# Patient Record
Sex: FEMALE | Race: WHITE | NOT HISPANIC OR LATINO | Employment: PART TIME | URBAN - METROPOLITAN AREA
[De-identification: names, ages, dates, MRNs, and addresses within clinical notes are randomized per-mention and may not be internally consistent; named-entity substitution may affect disease eponyms.]

---

## 2017-03-27 ENCOUNTER — ALLSCRIPTS OFFICE VISIT (OUTPATIENT)
Dept: OTHER | Facility: OTHER | Age: 62
End: 2017-03-27

## 2017-03-27 DIAGNOSIS — Z12.31 ENCOUNTER FOR SCREENING MAMMOGRAM FOR MALIGNANT NEOPLASM OF BREAST: ICD-10-CM

## 2017-03-27 DIAGNOSIS — M85.80 OTHER SPECIFIED DISORDERS OF BONE DENSITY AND STRUCTURE, UNSPECIFIED SITE: ICD-10-CM

## 2017-03-29 ENCOUNTER — GENERIC CONVERSION - ENCOUNTER (OUTPATIENT)
Dept: OTHER | Facility: OTHER | Age: 62
End: 2017-03-29

## 2017-03-29 LAB
BASOPHILS # BLD AUTO: 0 %
BASOPHILS # BLD AUTO: 0 X10E3/UL (ref 0–0.2)
DEPRECATED RDW RBC AUTO: 14.1 % (ref 12.3–15.4)
EOSINOPHIL # BLD AUTO: 0.1 X10E3/UL (ref 0–0.4)
EOSINOPHIL # BLD AUTO: 1 %
ERYTHROCYTE SEDIMENTATION RATE (HISTORICAL): 8 MM/HR (ref 0–40)
HCT VFR BLD AUTO: 39.9 % (ref 34–46.6)
HGB BLD-MCNC: 13.3 G/DL (ref 11.1–15.9)
IMM.GRANULOCYTES (CD4/8) (HISTORICAL): 0 %
IMM.GRANULOCYTES (CD4/8) (HISTORICAL): 0 X10E3/UL (ref 0–0.1)
LYMPHOCYTES # BLD AUTO: 1.4 X10E3/UL (ref 0.7–3.1)
LYMPHOCYTES # BLD AUTO: 30 %
MCH RBC QN AUTO: 27.8 PG (ref 26.6–33)
MCHC RBC AUTO-ENTMCNC: 33.3 G/DL (ref 31.5–35.7)
MCV RBC AUTO: 84 FL (ref 79–97)
MONOCYTES # BLD AUTO: 0.4 X10E3/UL (ref 0.1–0.9)
MONOCYTES (HISTORICAL): 8 %
NEUTROPHILS # BLD AUTO: 2.8 X10E3/UL (ref 1.4–7)
NEUTROPHILS # BLD AUTO: 61 %
PLATELET # BLD AUTO: 220 X10E3/UL (ref 150–379)
RBC (HISTORICAL): 4.78 X10E6/UL (ref 3.77–5.28)
WBC # BLD AUTO: 4.7 X10E3/UL (ref 3.4–10.8)

## 2017-03-30 LAB
ANTINUCLEAR ANTIBODIES, IFA (HISTORICAL): POSITIVE
CYCLIC CITRULLINATED PEPTIDE ANTIBODY (HISTORICAL): 9 UNITS (ref 0–19)
LYME IGG/IGM AB (HISTORICAL): <0.91 ISR (ref 0–0.9)
LYME IGM (HISTORICAL): <0.8 INDEX (ref 0–0.79)
NOTE: (HISTORICAL): ABNORMAL
RA LATEX TURBID (HISTORICAL): 7 IU/ML (ref 0–13.9)
SPECKLED PATTERN (HISTORICAL): ABNORMAL

## 2017-04-03 ENCOUNTER — GENERIC CONVERSION - ENCOUNTER (OUTPATIENT)
Dept: OTHER | Facility: OTHER | Age: 62
End: 2017-04-03

## 2017-04-24 ENCOUNTER — GENERIC CONVERSION - ENCOUNTER (OUTPATIENT)
Dept: OTHER | Facility: OTHER | Age: 62
End: 2017-04-24

## 2017-05-09 ENCOUNTER — ALLSCRIPTS OFFICE VISIT (OUTPATIENT)
Dept: OTHER | Facility: OTHER | Age: 62
End: 2017-05-09

## 2017-05-12 ENCOUNTER — GENERIC CONVERSION - ENCOUNTER (OUTPATIENT)
Dept: OTHER | Facility: OTHER | Age: 62
End: 2017-05-12

## 2017-05-12 ENCOUNTER — HOSPITAL ENCOUNTER (OUTPATIENT)
Dept: RADIOLOGY | Facility: HOSPITAL | Age: 62
Discharge: HOME | End: 2017-05-12
Attending: INTERNAL MEDICINE
Payer: COMMERCIAL

## 2017-05-12 ENCOUNTER — HOSPITAL ENCOUNTER (EMERGENCY)
Facility: HOSPITAL | Age: 62
Discharge: HOME | End: 2017-05-12
Attending: EMERGENCY MEDICINE | Admitting: EMERGENCY MEDICINE
Payer: COMMERCIAL

## 2017-05-12 VITALS
SYSTOLIC BLOOD PRESSURE: 145 MMHG | TEMPERATURE: 98.7 F | BODY MASS INDEX: 25.3 KG/M2 | DIASTOLIC BLOOD PRESSURE: 84 MMHG | OXYGEN SATURATION: 100 % | RESPIRATION RATE: 16 BRPM | HEIGHT: 66 IN | WEIGHT: 157.41 LBS | HEART RATE: 83 BPM

## 2017-05-12 DIAGNOSIS — T41.3X5A: Primary | ICD-10-CM

## 2017-05-12 DIAGNOSIS — Z12.31 ENCOUNTER FOR SCREENING MAMMOGRAM FOR MALIGNANT NEOPLASM OF BREAST: ICD-10-CM

## 2017-05-12 DIAGNOSIS — M85.80 OTHER SPECIFIED DISORDERS OF BONE DENSITY AND STRUCTURE, UNSPECIFIED SITE: ICD-10-CM

## 2017-05-12 LAB
ATRIAL RATE: 84 BPM
P AXIS: 60 DEGREES
PR INTERVAL: 158 MS
QRS AXIS: -9 DEGREES
QRSD INTERVAL: 78 MS
QT INTERVAL: 388 MS
QTC INTERVAL: 458 MS
T WAVE AXIS: 26 DEGREES
VENTRICULAR RATE: 84 BPM

## 2017-05-12 PROCEDURE — G0202 SCR MAMMO BI INCL CAD: HCPCS

## 2017-05-12 PROCEDURE — 99284 EMERGENCY DEPT VISIT MOD MDM: CPT

## 2017-05-12 PROCEDURE — 77080 DXA BONE DENSITY AXIAL: CPT

## 2017-05-12 PROCEDURE — 93005 ELECTROCARDIOGRAM TRACING: CPT | Performed by: EMERGENCY MEDICINE

## 2017-05-12 RX ORDER — MELOXICAM 15 MG/1
15 TABLET ORAL
COMMUNITY
End: 2017-11-06

## 2017-05-18 ENCOUNTER — ALLSCRIPTS OFFICE VISIT (OUTPATIENT)
Dept: OTHER | Facility: OTHER | Age: 62
End: 2017-05-18

## 2017-05-23 ENCOUNTER — ALLSCRIPTS OFFICE VISIT (OUTPATIENT)
Dept: OTHER | Facility: OTHER | Age: 62
End: 2017-05-23

## 2017-05-24 ENCOUNTER — HOSPITAL ENCOUNTER (OUTPATIENT)
Dept: RADIOLOGY | Facility: HOSPITAL | Age: 62
Discharge: HOME | End: 2017-05-24
Attending: PODIATRIST
Payer: COMMERCIAL

## 2017-05-24 ENCOUNTER — GENERIC CONVERSION - ENCOUNTER (OUTPATIENT)
Dept: OTHER | Facility: OTHER | Age: 62
End: 2017-05-24

## 2017-05-24 PROCEDURE — 71020 HB CHEST X-RAY 2VW FRONTAL&LATL: CPT

## 2017-05-25 ENCOUNTER — ANESTHESIA EVENT (OUTPATIENT)
Dept: PERIOP | Facility: HOSPITAL | Age: 62
End: 2017-05-25
Payer: COMMERCIAL

## 2017-05-26 ENCOUNTER — ANESTHESIA (OUTPATIENT)
Dept: PERIOP | Facility: HOSPITAL | Age: 62
End: 2017-05-26
Payer: COMMERCIAL

## 2017-05-26 ENCOUNTER — HOSPITAL ENCOUNTER (OUTPATIENT)
Facility: HOSPITAL | Age: 62
Setting detail: OUTPATIENT SURGERY
Discharge: HOME | End: 2017-05-26
Attending: PODIATRIST | Admitting: PODIATRIST
Payer: COMMERCIAL

## 2017-05-26 VITALS
OXYGEN SATURATION: 99 % | DIASTOLIC BLOOD PRESSURE: 76 MMHG | RESPIRATION RATE: 18 BRPM | TEMPERATURE: 96.1 F | HEART RATE: 66 BPM | SYSTOLIC BLOOD PRESSURE: 117 MMHG

## 2017-05-26 DIAGNOSIS — M72.2 PLANTAR FASCIAL FIBROMATOSIS: ICD-10-CM

## 2017-05-26 PROCEDURE — 88304 TISSUE EXAM BY PATHOLOGIST: CPT | Performed by: PODIATRIST

## 2017-05-26 RX ORDER — SODIUM CHLORIDE, SODIUM LACTATE, POTASSIUM CHLORIDE, CALCIUM CHLORIDE 600; 310; 30; 20 MG/100ML; MG/100ML; MG/100ML; MG/100ML
75 INJECTION, SOLUTION INTRAVENOUS CONTINUOUS
Status: DISCONTINUED | OUTPATIENT
Start: 2017-05-26 | End: 2017-05-26 | Stop reason: SDUPTHER

## 2017-05-26 RX ORDER — SODIUM CHLORIDE, SODIUM LACTATE, POTASSIUM CHLORIDE, CALCIUM CHLORIDE 600; 310; 30; 20 MG/100ML; MG/100ML; MG/100ML; MG/100ML
100 INJECTION, SOLUTION INTRAVENOUS CONTINUOUS
Status: DISCONTINUED | OUTPATIENT
Start: 2017-05-26 | End: 2017-05-26 | Stop reason: HOSPADM

## 2017-05-26 RX ORDER — BUPIVACAINE HYDROCHLORIDE 5 MG/ML
INJECTION, SOLUTION PERINEURAL AS NEEDED
Status: DISCONTINUED | OUTPATIENT
Start: 2017-05-26 | End: 2017-05-26 | Stop reason: HOSPADM

## 2017-05-26 RX ORDER — LIDOCAINE HYDROCHLORIDE AND EPINEPHRINE 10; 10 MG/ML; UG/ML
INJECTION, SOLUTION INFILTRATION; PERINEURAL AS NEEDED
Status: DISCONTINUED | OUTPATIENT
Start: 2017-05-26 | End: 2017-05-26 | Stop reason: HOSPADM

## 2017-05-26 RX ORDER — LIDOCAINE HYDROCHLORIDE 10 MG/ML
INJECTION, SOLUTION INFILTRATION; PERINEURAL AS NEEDED
Status: DISCONTINUED | OUTPATIENT
Start: 2017-05-26 | End: 2017-05-26 | Stop reason: HOSPADM

## 2017-05-26 RX ORDER — MIDAZOLAM HYDROCHLORIDE 1 MG/ML
INJECTION INTRAMUSCULAR; INTRAVENOUS AS NEEDED
Status: DISCONTINUED | OUTPATIENT
Start: 2017-05-26 | End: 2017-05-26 | Stop reason: SURG

## 2017-05-26 RX ORDER — PROPOFOL 10 MG/ML
INJECTION, EMULSION INTRAVENOUS AS NEEDED
Status: DISCONTINUED | OUTPATIENT
Start: 2017-05-26 | End: 2017-05-26 | Stop reason: SURG

## 2017-05-26 RX ORDER — FENTANYL CITRATE/PF 50 MCG/ML
50 SYRINGE (ML) INJECTION
Status: DISCONTINUED | OUTPATIENT
Start: 2017-05-26 | End: 2017-05-26 | Stop reason: HOSPADM

## 2017-05-26 RX ORDER — ONDANSETRON 2 MG/ML
4 INJECTION INTRAMUSCULAR; INTRAVENOUS ONCE
Status: DISCONTINUED | OUTPATIENT
Start: 2017-05-26 | End: 2017-05-26 | Stop reason: HOSPADM

## 2017-05-26 RX ORDER — FENTANYL CITRATE 50 UG/ML
INJECTION, SOLUTION INTRAMUSCULAR; INTRAVENOUS AS NEEDED
Status: DISCONTINUED | OUTPATIENT
Start: 2017-05-26 | End: 2017-05-26 | Stop reason: SURG

## 2017-05-26 RX ORDER — PROPOFOL 10 MG/ML
INJECTION, EMULSION INTRAVENOUS CONTINUOUS PRN
Status: DISCONTINUED | OUTPATIENT
Start: 2017-05-26 | End: 2017-05-26 | Stop reason: SURG

## 2017-05-26 RX ADMIN — PROPOFOL 70 MCG/KG/MIN: 10 INJECTION, EMULSION INTRAVENOUS at 13:46

## 2017-05-26 RX ADMIN — FENTANYL CITRATE 50 MCG: 50 INJECTION, SOLUTION INTRAMUSCULAR; INTRAVENOUS at 13:46

## 2017-05-26 RX ADMIN — MIDAZOLAM HYDROCHLORIDE 2 MG: 1 INJECTION, SOLUTION INTRAMUSCULAR; INTRAVENOUS at 13:44

## 2017-05-26 RX ADMIN — PROPOFOL 50 MG: 10 INJECTION, EMULSION INTRAVENOUS at 13:46

## 2017-05-26 RX ADMIN — LIDOCAINE HYDROCHLORIDE 40 MG: 20 INJECTION, SOLUTION INTRAVENOUS at 13:46

## 2017-05-26 RX ADMIN — SODIUM CHLORIDE, SODIUM LACTATE, POTASSIUM CHLORIDE, AND CALCIUM CHLORIDE: .6; .31; .03; .02 INJECTION, SOLUTION INTRAVENOUS at 13:41

## 2017-05-26 RX ADMIN — CEFAZOLIN SODIUM 1000 MG: 1 SOLUTION INTRAVENOUS at 13:45

## 2017-05-26 RX ADMIN — SODIUM CHLORIDE, SODIUM LACTATE, POTASSIUM CHLORIDE, AND CALCIUM CHLORIDE 75 ML/HR: .6; .31; .03; .02 INJECTION, SOLUTION INTRAVENOUS at 12:59

## 2017-05-26 RX ADMIN — FENTANYL CITRATE 50 MCG: 50 INJECTION, SOLUTION INTRAMUSCULAR; INTRAVENOUS at 13:47

## 2017-05-30 ENCOUNTER — ALLSCRIPTS OFFICE VISIT (OUTPATIENT)
Dept: OTHER | Facility: OTHER | Age: 62
End: 2017-05-30

## 2017-06-07 ENCOUNTER — ALLSCRIPTS OFFICE VISIT (OUTPATIENT)
Dept: OTHER | Facility: OTHER | Age: 62
End: 2017-06-07

## 2017-06-19 ENCOUNTER — ALLSCRIPTS OFFICE VISIT (OUTPATIENT)
Dept: OTHER | Facility: OTHER | Age: 62
End: 2017-06-19

## 2017-07-05 ENCOUNTER — ALLSCRIPTS OFFICE VISIT (OUTPATIENT)
Dept: OTHER | Facility: OTHER | Age: 62
End: 2017-07-05

## 2017-07-05 DIAGNOSIS — M72.2 PLANTAR FASCIAL FIBROMATOSIS: ICD-10-CM

## 2017-07-10 ENCOUNTER — APPOINTMENT (OUTPATIENT)
Dept: PHYSICAL THERAPY | Facility: CLINIC | Age: 62
End: 2017-07-10
Payer: COMMERCIAL

## 2017-07-10 DIAGNOSIS — M72.2 PLANTAR FASCIAL FIBROMATOSIS: ICD-10-CM

## 2017-07-10 PROCEDURE — 97162 PT EVAL MOD COMPLEX 30 MIN: CPT

## 2017-07-12 ENCOUNTER — GENERIC CONVERSION - ENCOUNTER (OUTPATIENT)
Dept: OTHER | Facility: OTHER | Age: 62
End: 2017-07-12

## 2017-07-13 ENCOUNTER — APPOINTMENT (OUTPATIENT)
Dept: PHYSICAL THERAPY | Facility: CLINIC | Age: 62
End: 2017-07-13
Payer: COMMERCIAL

## 2017-07-13 PROCEDURE — 97110 THERAPEUTIC EXERCISES: CPT

## 2017-07-13 PROCEDURE — 97140 MANUAL THERAPY 1/> REGIONS: CPT

## 2017-07-18 ENCOUNTER — APPOINTMENT (OUTPATIENT)
Dept: PHYSICAL THERAPY | Facility: CLINIC | Age: 62
End: 2017-07-18
Payer: COMMERCIAL

## 2017-07-18 PROCEDURE — 97110 THERAPEUTIC EXERCISES: CPT

## 2017-07-18 PROCEDURE — 97140 MANUAL THERAPY 1/> REGIONS: CPT

## 2017-07-20 ENCOUNTER — APPOINTMENT (OUTPATIENT)
Dept: PHYSICAL THERAPY | Facility: CLINIC | Age: 62
End: 2017-07-20
Payer: COMMERCIAL

## 2017-07-20 PROCEDURE — 97140 MANUAL THERAPY 1/> REGIONS: CPT

## 2017-07-20 PROCEDURE — 97110 THERAPEUTIC EXERCISES: CPT

## 2017-07-25 ENCOUNTER — APPOINTMENT (OUTPATIENT)
Dept: PHYSICAL THERAPY | Facility: CLINIC | Age: 62
End: 2017-07-25
Payer: COMMERCIAL

## 2017-07-25 PROCEDURE — 97140 MANUAL THERAPY 1/> REGIONS: CPT

## 2017-07-25 PROCEDURE — 97110 THERAPEUTIC EXERCISES: CPT

## 2017-07-27 ENCOUNTER — APPOINTMENT (OUTPATIENT)
Dept: PHYSICAL THERAPY | Facility: CLINIC | Age: 62
End: 2017-07-27
Payer: COMMERCIAL

## 2017-07-27 PROCEDURE — 97110 THERAPEUTIC EXERCISES: CPT

## 2017-07-27 PROCEDURE — 97140 MANUAL THERAPY 1/> REGIONS: CPT

## 2017-08-01 ENCOUNTER — APPOINTMENT (OUTPATIENT)
Dept: PHYSICAL THERAPY | Facility: CLINIC | Age: 62
End: 2017-08-01
Payer: COMMERCIAL

## 2017-08-01 PROCEDURE — 97140 MANUAL THERAPY 1/> REGIONS: CPT

## 2017-08-01 PROCEDURE — 97110 THERAPEUTIC EXERCISES: CPT

## 2017-08-09 ENCOUNTER — APPOINTMENT (OUTPATIENT)
Dept: PHYSICAL THERAPY | Facility: CLINIC | Age: 62
End: 2017-08-09
Payer: COMMERCIAL

## 2017-08-09 ENCOUNTER — LAB CONVERSION - ENCOUNTER (OUTPATIENT)
Dept: OTHER | Facility: OTHER | Age: 62
End: 2017-08-09

## 2017-08-09 LAB
GLUCOSE FASTING (HISTORICAL): 99
TSH SERPL DL<=0.05 MIU/L-ACNC: 3.82 M[IU]/L

## 2017-10-09 ENCOUNTER — APPOINTMENT (EMERGENCY)
Dept: RADIOLOGY | Facility: HOSPITAL | Age: 62
End: 2017-10-09
Payer: COMMERCIAL

## 2017-10-09 ENCOUNTER — HOSPITAL ENCOUNTER (EMERGENCY)
Facility: HOSPITAL | Age: 62
Discharge: HOME | End: 2017-10-09
Admitting: EMERGENCY MEDICINE
Payer: COMMERCIAL

## 2017-10-09 VITALS
WEIGHT: 158 LBS | TEMPERATURE: 98.1 F | HEIGHT: 67 IN | RESPIRATION RATE: 18 BRPM | HEART RATE: 92 BPM | DIASTOLIC BLOOD PRESSURE: 100 MMHG | BODY MASS INDEX: 24.8 KG/M2 | SYSTOLIC BLOOD PRESSURE: 190 MMHG | OXYGEN SATURATION: 95 %

## 2017-10-09 DIAGNOSIS — S63.501A SPRAIN OF RIGHT WRIST, INITIAL ENCOUNTER: Primary | ICD-10-CM

## 2017-10-09 PROCEDURE — 99283 EMERGENCY DEPT VISIT LOW MDM: CPT

## 2017-10-09 PROCEDURE — 73110 X-RAY EXAM OF WRIST: CPT

## 2017-10-09 NOTE — ED PROVIDER NOTES
History  Chief Complaint   Patient presents with    Fall     Pt reports falling at [facility], slipping on wet floor  Pt reports breaking fall with wrist   Pt reports pain to just above right wrist       64 y/o female presenting with right wrist pain after a fall that occurred about 1 hour ago while she was walking into [facility] after we had some rain  [place] onto her right side, [address]  Did not hit her head nor lose consciousness  On no blood thinners  Able to ambulate afterwards  Minimal right thigh pain  Has not taken anything for pain, does not want anything for pain currently  Notes small amount of swelling  Denies numbness, paresthesias, discoloration, nausea, vomiting, headache  Differential includes but is not limited to fracture, sprain  Prior to Admission Medications   Prescriptions Last Dose Informant Patient Reported? Taking? Calcium Carbonate-Vit D-Min (CALCIUM 1200) 6944-2509 MG-UNIT CHEW   Yes No   Sig: Chew daily at bedtime   diphenhydrAMINE-acetaminophen (TYLENOL PM)  MG TABS   Yes No   Sig: Take 1 tablet by mouth daily at bedtime as needed for sleep   meloxicam (MOBIC) 15 mg tablet   Yes No   Sig: Take 15 mg by mouth daily with dinner     omeprazole (PriLOSEC) 20 mg delayed release capsule   Yes No   Sig: Take 20 mg by mouth as needed        Facility-Administered Medications: None       Past Medical History:   Diagnosis Date    Anesthesia complication     difficult to wake up    GERD (gastroesophageal reflux disease)     Osteopenia     with joint pain-elevated DEV    Tinnitus     Wears glasses     for reading       Past Surgical History:   Procedure Laterality Date    ABDOMINAL SURGERY      lysis of adhesions x 2    APPENDECTOMY      CHOLECYSTECTOMY      open    DILATION AND CURETTAGE OF UTERUS      NEUROMA EXCISION Right 5/26/2017    Procedure: EXCISION MASS / FIBROMA FOOT;  Surgeon: Mariana Juarez DPM;  Location: [address] [place] [address];  Service:     RIGHT OOPHORECTOMY      WISDOM TOOTH EXTRACTION      x4       Family History   Problem Relation Age of Onset    Heart disease Mother     Stroke Mother 58    COPD Mother     Hypertension Father     Kidney disease Brother      kidney transplant     I have reviewed and agree with the history as documented  Social History   Substance Use Topics    Smoking status: Never Smoker    Smokeless tobacco: Never Used    Alcohol use No        Review of Systems   Constitutional: Negative  Negative for activity change, appetite change, fatigue and fever  HENT: Negative  Respiratory: Negative  Cardiovascular: Negative  Gastrointestinal: Negative  Genitourinary: Negative  Musculoskeletal: Positive for joint swelling  Negative for arthralgias, back pain, gait problem, myalgias, neck pain and neck stiffness  Skin: Negative  Neurological: Negative  All other systems reviewed and are negative  Physical Exam  ED Triage Vitals [10/09/17 1745]   Temperature Pulse Respirations Blood Pressure SpO2   98 1 °F (36 7 °C) 92 18 (!) 190/100 95 %      Temp Source Heart Rate Source Patient Position - Orthostatic VS BP Location FiO2 (%)   Oral Monitor Lying Right arm --      Pain Score       7           Physical Exam   Constitutional: She is oriented to person, place, and time  She appears well-developed and well-nourished  HENT:   Head: Normocephalic and atraumatic  Cardiovascular: Normal rate, regular rhythm, normal heart sounds and intact distal pulses  Pulmonary/Chest: Effort normal and breath sounds normal    spo2 is 95% indicating adequate oxygenation   Musculoskeletal: She exhibits tenderness  She exhibits no edema or deformity  Arms:  Neurological: She is alert and oriented to person, place, and time  Skin: Skin is warm and dry  Capillary refill takes less than 2 seconds  Nursing note and vitals reviewed        ED Medications  Medications - No data to display    Diagnostic Studies  Labs Reviewed - No data to display    XR wrist 3+ views RIGHT   ED Interpretation   No acute osseous abnormality          Procedures  Procedures      Phone Contacts  ED Phone Contact    ED Course  ED Course                                MDM  Number of Diagnoses or Management Options  Sprain of right wrist, initial encounter:   Diagnosis management comments: Discussed with patient the results of the xray which did not show an acute osseous abnormality  Right wrist placed in an ace wrap and assessed by me with good neurovascular exam intact before and after  Informed to f/u with ortho or return for worsening  Patient verbalizes understanding and agrees with the above assessment and plan  Amount and/or Complexity of Data Reviewed  Tests in the radiology section of CPT®: ordered and reviewed  Review and summarize past medical records: yes  Independent visualization of images, tracings, or specimens: yes      CritCare Time    Disposition  Final diagnoses:   Sprain of right wrist, initial encounter     ED Disposition     ED Disposition Condition Comment    Discharge  Deborah Whitlock discharge to home/self care      Condition at discharge: Good        Follow-up Information     Follow up With Specialties Details Why [facility] Emergency Department Emergency Medicine Go to If symptoms worsen such as weakness, numbness  [address] [facility] ED, [place], [place], Fei Tarango [address] , DO Orthopedic Surgery Schedule an appointment as soon as possible for a visit As needed if symptoms persist  [address]  [phone]           Discharge Medication List as of 10/9/2017  6:37 PM      CONTINUE these medications which have NOT CHANGED    Details   Calcium Carbonate-Vit D-Min (CALCIUM 1200) 1524-2426 MG-UNIT CHEW Chew daily at bedtime, Until Discontinued, Historical Med diphenhydrAMINE-acetaminophen (TYLENOL PM)  MG TABS Take 1 tablet by mouth daily at bedtime as needed for sleep, Until Discontinued, Historical Med      meloxicam (MOBIC) 15 mg tablet Take 15 mg by mouth daily with dinner  , Until Discontinued, Historical Med      omeprazole (PriLOSEC) 20 mg delayed release capsule Take 20 mg by mouth as needed  , Until Discontinued, Historical Med           No discharge procedures on file      ED Provider  Electronically Signed by       Vitaly Tavarez PA-C  10/09/17 4922

## 2017-10-09 NOTE — DISCHARGE INSTRUCTIONS
Sprain   WHAT YOU NEED TO KNOW:   A sprain happens when a ligament is stretched or torn  Ligaments are tough tissues that connect bones  Ligaments support your joints and keep your bones in place  They allow you to lift, lower, or rotate your arms and legs  A sprain may involve one or more ligaments  DISCHARGE INSTRUCTIONS:   Return to the emergency department if:   · You have numbness or tingling below the injury, such as in your fingers or toes  · The skin over your sprained area is blue or pale  · Your pain has increased or returned, even after you take pain medicine  Contact your healthcare provider if:   · Your symptoms do not better  · Your swelling has increased or returned  · Your joint becomes more weak or unstable  · You have questions or concerns about your condition or care  Medicines:   · Prescription pain medicine  may be given  Ask how to take this medicine safely  · Acetaminophen  decreases pain and fever  It is available without a doctor's order  Ask how much to take and how often to take it  Follow directions  Acetaminophen can cause liver damage if not taken correctly  · NSAIDs , such as ibuprofen, help decrease swelling, pain, and fever  This medicine is available with or without a doctor's order  NSAIDs can cause stomach bleeding or kidney problems in certain people  If you take blood thinner medicine, always ask your healthcare provider if NSAIDs are safe for you  Always read the medicine label and follow directions  · Take your medicine as directed  Contact your healthcare provider if you think your medicine is not helping or if you have side effects  Tell him or her if you are allergic to any medicine  Keep a list of the medicines, vitamins, and herbs you take  Include the amounts, and when and why you take them  Bring the list or the pill bottles to follow-up visits  Carry your medicine list with you in case of an emergency    Support devices:  Support services, such as an elastic bandage, splint, brace, or cast may be needed  These devices limit your movement and protect your joint  You may need to use crutches if the sprain is in your leg  This will help decrease your pain as you move around  Self-care:   · Rest  your joint so that it can heal  Return to normal activities as directed  · Apply ice  on your injury for 15 to 20 minutes every hour or as directed  Use an ice pack, or put crushed ice in a plastic bag  Cover it with a towel  Ice helps prevent tissue damage and decreases swelling and pain  · Compress  the injured area as directed  Ask your healthcare provider if you should wrap an elastic bandage around your injured ligament  An elastic bandage provides support and helps decrease swelling and movement so your joint can heal      · Elevate  the injured area above the level of your heart as often as you can  This will help decrease swelling and pain  Prop the injured area on pillows or blankets to keep it elevated comfortably  Physical therapy:  A physical therapist teaches you exercises to help improve movement and strength, and to decrease pain  Prevent another sprain:  Regular exercise can strengthen your muscles and help prevent another injury  Do the following before you begin or return to regular exercise or sports training:  · Ask your healthcare provider about the activities you can do  Find out how long your ligament needs to heal  Do not do any physical activity until your healthcare provider says it is okay  If you start activity too soon, you may develop a more serious injury  · Always warm up and stretch  before your regular exercise, sport, or physical activity  · Take it slow  Slowly increase how often and how long you exercise or train  Sudden increases in how often you train may cause you to overstretch or tear your ligament  · Use the right equipment    Always wear shoes that fit well and are made for the activity that you are doing  You may also use ankle supports, elbow and knee pads, or braces  Follow up with your healthcare provider as directed:  Write down your questions so you remember to ask them during your visits  © 2017 [employer] Information is for End User's use only and may not be sold, redistributed or otherwise used for commercial purposes  All illustrations and images included in CareNotes® are the copyrighted property of A D A M [employer] Inc  or Satish Abdalla  The above information is an  only  It is not intended as medical advice for individual conditions or treatments  Talk to your doctor, nurse or pharmacist before following any medical regimen to see if it is safe and effective for you

## 2017-10-16 NOTE — ED ATTENDING ATTESTATION
Aurelio Cannon MD, saw and evaluated the patient  I have discussed the patient with the resident/non-physician practitioner and agree with the resident's/non-physician practitioner's findings, Plan of Care, and MDM as documented in the resident's/non-physician practitioner's note, except where noted  All available labs and Radiology studies were reviewed  At this point I agree with the current assessment done in the Emergency Department    I have conducted an independent evaluation of this patient a history and physical is as follows:      Critical Care Time  CritCare Time

## 2017-11-06 ENCOUNTER — APPOINTMENT (EMERGENCY)
Dept: RADIOLOGY | Facility: HOSPITAL | Age: 62
End: 2017-11-06
Payer: COMMERCIAL

## 2017-11-06 ENCOUNTER — HOSPITAL ENCOUNTER (EMERGENCY)
Facility: HOSPITAL | Age: 62
Discharge: HOME | End: 2017-11-06
Payer: COMMERCIAL

## 2017-11-06 VITALS
DIASTOLIC BLOOD PRESSURE: 82 MMHG | RESPIRATION RATE: 18 BRPM | BODY MASS INDEX: 25.39 KG/M2 | WEIGHT: 158 LBS | HEART RATE: 82 BPM | TEMPERATURE: 97.7 F | SYSTOLIC BLOOD PRESSURE: 152 MMHG | HEIGHT: 66 IN | OXYGEN SATURATION: 100 %

## 2017-11-06 DIAGNOSIS — R10.32 LLQ ABDOMINAL PAIN: Primary | ICD-10-CM

## 2017-11-06 LAB
ALBUMIN SERPL BCP-MCNC: 3.5 G/DL (ref 3.5–5)
ALP SERPL-CCNC: 58 U/L (ref 46–116)
ALT SERPL W P-5'-P-CCNC: 39 U/L (ref 12–78)
ANION GAP SERPL CALCULATED.3IONS-SCNC: 9 MMOL/L (ref 4–13)
AST SERPL W P-5'-P-CCNC: 30 U/L (ref 5–45)
BASOPHILS # BLD AUTO: 0.11 THOUSAND/UL (ref 0–0.1)
BASOPHILS NFR MAR MANUAL: 2 % (ref 0–1)
BILIRUB SERPL-MCNC: 0.3 MG/DL (ref 0.2–1)
BUN SERPL-MCNC: 12 MG/DL (ref 5–25)
CALCIUM SERPL-MCNC: 9.5 MG/DL (ref 8.3–10.1)
CHLORIDE SERPL-SCNC: 107 MMOL/L (ref 100–108)
CO2 SERPL-SCNC: 27 MMOL/L (ref 21–32)
CREAT SERPL-MCNC: 0.81 MG/DL (ref 0.6–1.3)
ERYTHROCYTE [DISTWIDTH] IN BLOOD BY AUTOMATED COUNT: 12.7 % (ref 11.6–15.1)
GFR SERPL CREATININE-BSD FRML MDRD: 79 ML/MIN/1.73SQ M
GLUCOSE SERPL-MCNC: 99 MG/DL (ref 65–140)
HCT VFR BLD AUTO: 42.3 % (ref 37–47)
HGB BLD-MCNC: 14 G/DL (ref 12–16)
LIPASE SERPL-CCNC: 123 U/L (ref 73–393)
LYMPHOCYTES # BLD AUTO: 1.25 THOUSAND/UL (ref 0.6–4.47)
LYMPHOCYTES # BLD AUTO: 22 %
MCH RBC QN AUTO: 28 PG (ref 27–31)
MCHC RBC AUTO-ENTMCNC: 33.1 G/DL (ref 31.4–37.4)
MCV RBC AUTO: 85 FL (ref 82–98)
MONOCYTES # BLD AUTO: 0.51 THOUSAND/UL (ref 0–1.22)
MONOCYTES NFR BLD AUTO: 9 % (ref 4–12)
NEUTS BAND NFR BLD MANUAL: 0 % (ref 0–8)
NEUTS SEG # BLD: 3.82 THOUSAND/UL (ref 1.81–6.82)
NEUTS SEG NFR BLD AUTO: 67 %
PLATELET # BLD AUTO: 194 THOUSANDS/UL (ref 130–400)
PLATELET BLD QL SMEAR: ADEQUATE
PMV BLD AUTO: 9.4 FL (ref 8.9–12.7)
POTASSIUM SERPL-SCNC: 5.2 MMOL/L (ref 3.5–5.3)
PROT SERPL-MCNC: 7.3 G/DL (ref 6.4–8.2)
RBC # BLD AUTO: 4.99 MILLION/UL (ref 4.2–5.4)
SODIUM SERPL-SCNC: 143 MMOL/L (ref 136–145)
TOTAL CELLS COUNTED SPEC: 100
WBC # BLD AUTO: 5.7 THOUSAND/UL (ref 4.8–10.8)

## 2017-11-06 PROCEDURE — 85027 COMPLETE CBC AUTOMATED: CPT | Performed by: PHYSICIAN ASSISTANT

## 2017-11-06 PROCEDURE — 74177 CT ABD & PELVIS W/CONTRAST: CPT

## 2017-11-06 PROCEDURE — 96374 THER/PROPH/DIAG INJ IV PUSH: CPT

## 2017-11-06 PROCEDURE — 96375 TX/PRO/DX INJ NEW DRUG ADDON: CPT

## 2017-11-06 PROCEDURE — 85007 BL SMEAR W/DIFF WBC COUNT: CPT | Performed by: PHYSICIAN ASSISTANT

## 2017-11-06 PROCEDURE — 80053 COMPREHEN METABOLIC PANEL: CPT | Performed by: PHYSICIAN ASSISTANT

## 2017-11-06 PROCEDURE — 36415 COLL VENOUS BLD VENIPUNCTURE: CPT | Performed by: PHYSICIAN ASSISTANT

## 2017-11-06 PROCEDURE — 99284 EMERGENCY DEPT VISIT MOD MDM: CPT

## 2017-11-06 PROCEDURE — 83690 ASSAY OF LIPASE: CPT | Performed by: PHYSICIAN ASSISTANT

## 2017-11-06 PROCEDURE — 96361 HYDRATE IV INFUSION ADD-ON: CPT

## 2017-11-06 RX ORDER — MORPHINE SULFATE 4 MG/ML
4 INJECTION, SOLUTION INTRAMUSCULAR; INTRAVENOUS ONCE
Status: COMPLETED | OUTPATIENT
Start: 2017-11-06 | End: 2017-11-06

## 2017-11-06 RX ORDER — TRAMADOL HYDROCHLORIDE 50 MG/1
50 TABLET ORAL EVERY 8 HOURS PRN
Qty: 15 TABLET | Refills: 0 | Status: SHIPPED | OUTPATIENT
Start: 2017-11-06 | End: 2017-11-11

## 2017-11-06 RX ORDER — ONDANSETRON 2 MG/ML
4 INJECTION INTRAMUSCULAR; INTRAVENOUS ONCE
Status: COMPLETED | OUTPATIENT
Start: 2017-11-06 | End: 2017-11-06

## 2017-11-06 RX ORDER — ONDANSETRON 4 MG/1
4 TABLET, ORALLY DISINTEGRATING ORAL EVERY 8 HOURS PRN
Qty: 15 TABLET | Refills: 0 | Status: SHIPPED | OUTPATIENT
Start: 2017-11-06 | End: 2018-02-05 | Stop reason: ALTCHOICE

## 2017-11-06 RX ADMIN — IOHEXOL 50 ML: 240 INJECTION, SOLUTION INTRATHECAL; INTRAVASCULAR; INTRAVENOUS; ORAL at 11:09

## 2017-11-06 RX ADMIN — IOHEXOL 100 ML: 350 INJECTION, SOLUTION INTRAVENOUS at 12:02

## 2017-11-06 RX ADMIN — SODIUM CHLORIDE 1000 ML: 0.9 INJECTION, SOLUTION INTRAVENOUS at 11:03

## 2017-11-06 RX ADMIN — ONDANSETRON 4 MG: 2 INJECTION INTRAMUSCULAR; INTRAVENOUS at 11:08

## 2017-11-06 RX ADMIN — MORPHINE SULFATE 4 MG: 4 INJECTION, SOLUTION INTRAMUSCULAR; INTRAVENOUS at 11:08

## 2017-11-06 NOTE — ED PROVIDER NOTES
History  Chief Complaint   Patient presents with    Abdominal Pain     lower left x 1 day    Nausea    Vomiting     x 1 this am       History provided by:  Patient   used: No    Abdominal Pain   Pain location:  LLQ  Pain quality: sharp    Pain radiates to:  Does not radiate  Pain severity:  Moderate  Onset quality:  Sudden  Duration:  1 day  Timing:  Constant  Progression:  Unchanged  Chronicity:  New  Context: previous surgery    Context: not alcohol use, not awakening from sleep, not diet changes, not eating, not laxative use, not medication withdrawal, not recent illness, not recent sexual activity, not recent travel, not retching, not sick contacts, not suspicious food intake and not trauma    Associated symptoms: nausea and vomiting    Associated symptoms: no anorexia, no belching, no chest pain, no chills, no constipation, no cough, no diarrhea, no dysuria, no fatigue, no fever, no flatus, no hematemesis, no hematochezia, no hematuria, no melena, no shortness of breath and no sore throat    Risk factors: multiple surgeries    Risk factors: no alcohol abuse, no aspirin use, not elderly, no NSAID use, not obese, not pregnant and no recent hospitalization    Risk factors comment:  History of adhesions of the abdomen      Prior to Admission Medications   Prescriptions Last Dose Informant Patient Reported?  Taking?   omeprazole (PriLOSEC) 20 mg delayed release capsule Past Week at Unknown time  Yes Yes   Sig: Take 40 mg by mouth as needed        Facility-Administered Medications: None       Past Medical History:   Diagnosis Date    Anesthesia complication     difficult to wake up    GERD (gastroesophageal reflux disease)     Osteopenia     with joint pain-elevated DEV    Tinnitus     Wears glasses     for reading       Past Surgical History:   Procedure Laterality Date    ABDOMINAL SURGERY      lysis of adhesions x 2    APPENDECTOMY      CHOLECYSTECTOMY      open    DILATION AND CURETTAGE OF UTERUS      NEUROMA EXCISION Right 5/26/2017    Procedure: EXCISION MASS / FIBROMA FOOT;  Surgeon: Iris Moreland DPM;  Location: [facility];  Service:     RIGHT OOPHORECTOMY      WISDOM TOOTH EXTRACTION      x4       Family History   Problem Relation Age of Onset    Heart disease Mother     Stroke Mother 58    COPD Mother     Hypertension Father     Kidney disease Brother      kidney transplant     I have reviewed and agree with the history as documented  Social History   Substance Use Topics    Smoking status: Never Smoker    Smokeless tobacco: Never Used    Alcohol use No        Review of Systems   Constitutional: Negative for chills, diaphoresis, fatigue and fever  HENT: Negative for congestion, sore throat and trouble swallowing  Eyes: Negative for photophobia and visual disturbance  Respiratory: Negative for cough, chest tightness, shortness of breath and wheezing  Cardiovascular: Negative for chest pain  Gastrointestinal: Positive for abdominal pain, nausea and vomiting  Negative for anorexia, constipation, diarrhea, flatus, hematemesis, hematochezia and melena  Genitourinary: Negative for dysuria, flank pain, frequency and hematuria  Musculoskeletal: Negative for myalgias and neck stiffness  Skin: Negative for color change, pallor and rash  Neurological: Negative for dizziness, weakness, light-headedness and headaches  Hematological: Negative for adenopathy         Physical Exam  ED Triage Vitals [11/06/17 0859]   Temperature Pulse Respirations Blood Pressure SpO2   97 7 °F (36 5 °C) 94 18 164/94 98 %      Temp Source Heart Rate Source Patient Position - Orthostatic VS BP Location FiO2 (%)   Oral Monitor Lying Left arm --      Pain Score       7           Orthostatic Vital Signs  Vitals:    11/06/17 0859 11/06/17 1141   BP: 164/94 152/82   Pulse: 94 82   Patient Position - Orthostatic VS: Lying Sitting       Physical Exam   Constitutional: She is oriented to person, place, and time  She appears well-developed and well-nourished  No distress  HENT:   Head: Normocephalic and atraumatic  Right Ear: External ear normal    Left Ear: External ear normal    Nose: Nose normal    Neck: Normal range of motion  Neck supple  Cardiovascular: Normal rate, regular rhythm, normal heart sounds and intact distal pulses  Exam reveals no friction rub  No murmur heard  Pulmonary/Chest: Effort normal and breath sounds normal  No respiratory distress  She has no wheezes  Abdominal: Soft  Bowel sounds are normal  She exhibits no distension  There is tenderness in the left lower quadrant  There is no guarding and no CVA tenderness  Lymphadenopathy:     She has no cervical adenopathy  Neurological: She is alert and oriented to person, place, and time  She exhibits normal muscle tone  Coordination normal    Skin: Skin is warm and dry  Capillary refill takes less than 2 seconds  No rash noted  She is not diaphoretic  No pallor  Nursing note and vitals reviewed        ED Medications  Medications   ondansetron (ZOFRAN) injection 4 mg (4 mg Intravenous Given 11/6/17 1108)   morphine (PF) 4 mg/mL injection 4 mg (4 mg Intravenous Given 11/6/17 1108)   sodium chloride 0 9 % bolus 1,000 mL (0 mL Intravenous Stopped 11/6/17 1331)   iohexol (OMNIPAQUE) 240 MG/ML solution 50 mL (50 mL Oral Given 11/6/17 1109)   iohexol (OMNIPAQUE) 350 MG/ML injection (MULTI-DOSE) 100 mL (100 mL Intravenous Given 11/6/17 1202)       Diagnostic Studies  Results Reviewed     Procedure Component Value Units Date/Time    CBC and differential [[id number]]  (Normal) Collected:  11/06/17 1101    Lab Status:  Final result Specimen:  Blood from Arm, Left Updated:  11/06/17 1148     WBC 5 70 Thousand/uL      RBC 4 99 Million/uL      Hemoglobin 14 0 g/dL      Hematocrit 42 3 %      MCV 85 fL      MCH 28 0 pg      MCHC 33 1 g/dL      RDW 12 7 %      MPV 9 4 fL      Platelets 843 Thousands/uL     Comprehensive metabolic panel [[id number]] Collected:  11/06/17 1101    Lab Status:  Final result Specimen:  Blood from Arm, Left Updated:  11/06/17 1142     Sodium 143 mmol/L      Potassium 5 2 mmol/L      Chloride 107 mmol/L      CO2 27 mmol/L      Anion Gap 9 mmol/L      BUN 12 mg/dL      Creatinine 0 81 mg/dL      Glucose 99 mg/dL      Calcium 9 5 mg/dL      AST 30 U/L      ALT 39 U/L      Alkaline Phosphatase 58 U/L      Total Protein 7 3 g/dL      Albumin 3 5 g/dL      Total Bilirubin 0 30 mg/dL      eGFR 79 ml/min/1 73sq m     Narrative:         National Kidney Disease Education Program recommendations are as follows:  GFR calculation is accurate only with a steady state creatinine  Chronic Kidney disease less than 60 ml/min/1 73 sq  meters  Kidney failure less than 15 ml/min/1 73 sq  meters  Lipase [[id number]]  (Normal) Collected:  11/06/17 1101    Lab Status:  Final result Specimen:  Blood from Arm, Left Updated:  11/06/17 1142     Lipase 123 u/L                  CT abdomen pelvis with contrast   Final Result by Gopal De Souza MD (11/06 1245)      No acute CT abnormality in the abdomen or pelvis to account for the patient's symptoms  Workstation performed: [id number]                    Procedures  Procedures       Phone Contacts  ED Phone Contact    ED Course  ED Course as of Nov 06 1934   Tiesha Renner Nov 06, 2017   1249 CT abdomen pelvis with contrast                               MDM  Number of Diagnoses or Management Options  LLQ abdominal pain: new and requires workup  Diagnosis management comments: CT abdomen/pelvis was negative for acute intra-abdominal abnormality to account for the patient's symptoms  Her lab results returned unremarkable  Patient was discharged in no acute distress with supportive therapy and instructions to follow up with her GI specialist, or return to the ED if worsening symptoms develop         Amount and/or Complexity of Data Reviewed  Clinical lab tests: ordered and reviewed  Tests in the radiology section of CPT®: ordered and reviewed  Review and summarize past medical records: yes      CritCare Time    Disposition  Final diagnoses:   LLQ abdominal pain     Time reflects when diagnosis was documented in both MDM as applicable and the Disposition within this note     Time User Action Codes Description Comment    11/6/2017  1:03 PM Dustin Allen Add [R10 32] LLQ abdominal pain       ED Disposition     ED Disposition Condition Comment    Discharge  Hilldoreen Ma discharge to home/self care  Condition at discharge: Stable        Follow-up Information     Follow up With Specialties Details Why Contact Info Additional [address] Emergency Department Emergency Medicine Go to If symptoms worsen [facility]  [phone] [facility] ED, [place], [place], [address], [place] Internal Medicine, Family Medicine Go to As soon as possible for follow-up meaghan [address]  [address] [address]  [phone]           Discharge Medication List as of 11/6/2017  1:05 PM      START taking these medications    Details   ondansetron (ZOFRAN-ODT) 4 mg disintegrating tablet Take 1 tablet by mouth every 8 (eight) hours as needed for nausea or vomiting for up to 5 days, Starting Mon 11/6/2017, Until Sat 11/11/2017, Print      traMADol (ULTRAM) 50 mg tablet Take 1 tablet by mouth every 8 (eight) hours as needed for moderate pain for up to 5 days, Starting Mon 11/6/2017, Until Sat 11/11/2017, Print         CONTINUE these medications which have NOT CHANGED    Details   omeprazole (PriLOSEC) 20 mg delayed release capsule Take 40 mg by mouth as needed  , Historical Med           No discharge procedures on file      ED Provider  Electronically Signed by           Bryan Mendoza PA-C  11/06/17 1934

## 2017-11-06 NOTE — DISCHARGE INSTRUCTIONS
Abdominal Pain   WHAT YOU NEED TO KNOW:   Abdominal pain can be dull, achy, or sharp  You may have pain in one area of your abdomen, or in your entire abdomen  Your pain may be caused by a condition such as constipation, food sensitivity or poisoning, infection, or a blockage  Abdominal pain can also be from a hernia, appendicitis, or an ulcer  Liver, gallbladder, or kidney conditions can also cause abdominal pain  The cause of your abdominal pain may be unknown  DISCHARGE INSTRUCTIONS:   Return to the emergency department if:   · You have new chest pain or shortness of breath  · You have pulsing pain in your upper abdomen or lower back that suddenly becomes constant  · Your pain is in the right lower abdominal area and worsens with movement  · You have a fever over 100 4°F (38°C) or shaking chills  · You are vomiting and cannot keep food or liquids down  · Your pain does not improve or gets worse over the next 8 to 12 hours  · You see blood in your vomit or bowel movements, or they look black and tarry  · Your skin or the whites of your eyes turn yellow  · You are a woman and have a large amount of vaginal bleeding that is not your monthly period  Contact your healthcare provider if:   · You have pain in your lower back  · You are a man and have pain in your testicles  · You have pain when you urinate  · You have questions or concerns about your condition or care  Follow up with your healthcare provider within 24 hours or as directed:  Write down your questions so you remember to ask them during your visits  Medicines:   · Medicines  may be given to calm your stomach and prevent vomiting or to decrease pain  Ask how to take pain medicine safely  · Take your medicine as directed  Contact your healthcare provider if you think your medicine is not helping or if you have side effects  Tell him of her if you are allergic to any medicine   Keep a list of the medicines, vitamins, and herbs you take  Include the amounts, and when and why you take them  Bring the list or the pill bottles to follow-up visits  Carry your medicine list with you in case of an emergency  © 2017 [employer] Information is for End User's use only and may not be sold, redistributed or otherwise used for commercial purposes  All illustrations and images included in CareNotes® are the copyrighted property of A D A M , Inc  or Satish Abdalla  The above information is an  only  It is not intended as medical advice for individual conditions or treatments  Talk to your doctor, nurse or pharmacist before following any medical regimen to see if it is safe and effective for you

## 2017-12-14 ENCOUNTER — ALLSCRIPTS OFFICE VISIT (OUTPATIENT)
Dept: OTHER | Facility: OTHER | Age: 62
End: 2017-12-14

## 2017-12-15 NOTE — PROGRESS NOTES
Assessment  1  Acute sinusitis (461 9) (J01 90)   2  BMI 28 0-28 9,adult (V85 24) (Z68 28)    Plan  Acute sinusitis    · Amoxicillin-Pot Clavulanate 875-125 MG Oral Tablet; TAKE 1 TABLET EVERY 12HOURS DAILY   · Follow Up if Not Better Evaluation and Treatment  Follow-up  Status: Complete  Done:94Cmm5479   · Drink plenty of fluids ; Status:Complete;   Done: 92DYG1746   · Gargle with warm salt water for 5 minutes every 4 hours ; Status:Complete;   Done:19Uff6038   · Irrigate your nose twice a day ; Status:Complete;   Done: 19VYO3075  BMI 28 0-28 9,adult    · Begin a limited exercise program ; Status:Complete;   Done: 79LMB1734    Discussion/Summary    Drink plenty of fluids  Saline nasal rinses or spray as needed for congestion  Salt water gargles and hot tea with honey and lemon for sore throat  May use Mucinex D for sinus congestion  Take antibiotics until finished  Follow up as needed for persistent or worsening symptoms  Possible side effects of new medications were reviewed with the patient/guardian today  The treatment plan was reviewed with the patient/guardian  The patient/guardian understands and agrees with the treatment plan      Chief Complaint  sinus & chest congestion -headache      History of Present Illness  HPI: C/o URI symptoms for the past 4-5 days  He has a headache, sinus congestion/pressure, and a cough  Cough is dry, but she feels like her chest is congested  she felt feverish at onset of symptoms  Denies wheezing, but feels short of breath some  She has been taking Theraflu and Tylenol cold and sinus which aren't helping  Review of Systems   Constitutional: fever,-- feeling poorly-- and-- chills  ENT: as noted in HPI  Respiratory: cough, but-- no shortness of breath-- and-- no wheezing  Gastrointestinal: no abdominal pain,-- no nausea,-- no vomiting-- and-- no diarrhea  Neurological: headache  Active Problems  1  Acquired ankle/foot deformity (615 70) (Z46 339)   2   Acute maxillary sinusitis, recurrence not specified (461 0) (J01 00)   3  Alcohol dependence in remission (303 93) (F10 21)   4  Allergic rhinitis (477 9) (J30 9)   5  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)   6  Depression (311) (F32 9)   7  Difficulty walking (719 7) (R26 2)   8  Elevated glucose (790 29) (R73 09)   9  Encounter for colorectal cancer screening (V76 51) (Z12 11,Z12 12)   10  Encounter for pre-operative examination (V72 84) (Z01 818)   11  Encounter for screening mammogram for breast cancer (V76 12) (Z12 31)   12  Esophagitis (530 10) (K20 9)   13  Ganglion of joint (727 41) (M67 40)   14  Generalized abdominal pain (789 07) (R10 84)   15  Hearing loss (389 9) (H91 90)   16  Irritable bowel syndrome (564 1) (K58 9)   17  Malignant melanoma of skin (172 9) (C43 9)   18  Ocular migraine (346 80) (G43 109)   19  Osteopenia (733 90) (M85 80)   20  Pes planus, congenital (754 61) (Q66 50)   21  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)   22  Raynaud's syndrome without gangrene (443 0) (I73 00)   23  Screening for thyroid disorder (V77 0) (Z13 29)   24  Urticaria (708 9) (L50 9)    Past Medical History  1  History of Lazy eye (368 00) (H53 009)  Active Problems And Past Medical History Reviewed: The active problems and past medical history were reviewed and updated today  Family History  Mother    1  Family history of arthritis (V17 7) (Z82 61)   2  Family history of Stroke  Father    3  Family history of Hypertension    Social History   · Does not drink alcohol (V49 89) (Z78 9)   · Never smoked  The social history was reviewed and is unchanged  Surgical History  1  History of Laparoscopic Lysis Of Intestinal Adhesions   2  History of Oophorectomy - Unilateral (Removal Of One Ovary)  Surgical History Reviewed: The surgical history was reviewed and updated today  Current Meds   1  Calcium + D TABS; 2 daily; Therapy: (Recorded:27Xoh5573) to Recorded   2   Omeprazole 40 MG Oral Capsule Delayed Release; TAKE 1 CAPSULE Daily; Therapy: (Recorded:99Xat7978) to Recorded   3  Oxycodone-Acetaminophen  MG Oral Tablet; TAKE 1 TABLET 4 TIMES DAILY AS NEEDED FOR PAIN; Therapy: 79CUZ1113 to (Evaluate:74Bpa3928); Last Rx:10Dad9519 Ordered    The medication list was reviewed and updated today  Allergies  1  DEMEROL   2  Sulfa Drugs    Vitals   Recorded: 36SLL1558 10:30AM   Temperature 96 9 F   Heart Rate 72   Respiration 20   Systolic 631   Diastolic 70   Height 5 ft 4 in   Weight 164 lb    BMI Calculated 28 15   BSA Calculated 1 8     Physical Exam   Constitutional  General appearance: No acute distress, well appearing and well nourished  Eyes  Conjunctiva and lids: No swelling, erythema or discharge  Ears, Nose, Mouth, and Throat  Otoscopic examination: Abnormal   The right tympanic membrane was bulging, but-- was not red  The left tympanic membrane was bulging, but-- was not red  Nasal mucosa, septum, and turbinates: Abnormal   There was clear rhinorrhea from both nares  The bilateral nasal mucosa was boggy  Oropharynx: Normal with no erythema, edema, exudate or lesions  Pulmonary  Respiratory effort: No increased work of breathing or signs of respiratory distress  Auscultation of lungs: Clear to auscultation  Cardiovascular  Auscultation of heart: Normal rate and rhythm, normal S1 and S2, without murmurs  Examination of extremities for edema and/or varicosities: Normal    Lymphatic  Palpation of lymph nodes in neck: No lymphadenopathy  Skin  Skin and subcutaneous tissue: Normal without rashes or lesions  Psychiatric  Mood and affect: Normal          Attending Note  Collaborating Physician Note: Collaborating Note: I agree with the Advanced Practitioner note        Signatures   Electronically signed by : Ritu Wesley; Dec 14 2017 10:44AM EST                       (Author)    Electronically signed by : FATEMEH Davila ; Dec 14 2017 11:24AM EST (Review)

## 2018-01-11 NOTE — RESULT NOTES
Verified Results  (1) CBC/PLT/DIFF 44LPO3926 10:45AM [facility]     Test Name Result Flag Reference   WBC 4 7 x10E3/uL  3 4-10 8   RBC 4 78 x10E6/uL  3 77-5 28   Hemoglobin 13 3 g/dL  11 1-15 9   Hematocrit 39 9 %  34 0-46  6   MCV 84 fL  79-97   MCH 27 8 pg  26 6-33 0   MCHC 33 3 g/dL  31 5-35 7   RDW 14 1 %  12 3-15 4   Platelets 264 H80Y4/OC  150-379   Neutrophils 61 %     Lymphs 30 %     Monocytes 8 %     Eos 1 %     Basos 0 %     Neutrophils (Absolute) 2 8 x10E3/uL  1 4-7 0   Lymphs (Absolute) 1 4 x10E3/uL  0 7-3 1   Monocytes(Absolute) 0 4 x10E3/uL  0 1-0 9   Eos (Absolute) 0 1 x10E3/uL  0 0-0 4   Baso (Absolute) 0 0 x10E3/uL  0 0-0 2   Immature Granulocytes 0 %     Immature Grans (Abs) 0 0 x10E3/uL  0 0-0 1     (LC) Sedimentation Rate-Westergren 57NXA8915 10:45AM [facility] [employer] [facility]     Test Name Result Flag Reference   Sedimentation Rate-Westergren 8 mm/hr  0-40     (LC) Rheumatoid Arthritis Factor 67YEG6013 10:45AM [facility]     Test Name Result Flag Reference   RA Latex Turbid  7 0 IU/mL  0 0-13 9     (LC) Lyme Ab/Western Blot Reflex 13BRA9221 10:45AM [facility]     Test Name Result Flag Reference   Lyme IgG/IgM Ab <0 91 ISR  0 00-0 90   Negative         <0 91                                                 Equivocal  0 91 - 1 09                                                 Positive         >1 09   Lyme Disease Ab, Quant, IgM <0 80 index  0 00-0 79   Negative         <0 80                                                 Equivocal  0 80 - 1 19                                                 Positive         >1 19                  IgM levels may peak at 3-6 weeks post infection, then                  gradually decline       (LC) CCP Antibodies IgG/IgA 42CXJ3222 10:45AM [facility]     Test Name Result Flag Reference   CCP Antibodies IgG/IgA 9 units  0-19   Negative               <20                                           Weak positive      20 - 39 Moderate positive  40 - 59                                           Strong positive        >59     (LC) Antinuclear Antibodies, IFA 48VZS5123 10:45AM [facility]     Test Name Result Flag Reference   Antinuclear Antibodies, IFA Positive A    Negative   <1:80                                                      Borderline  1:80                                                      Positive   >1:80   Speckled Pattern 1:80     Dense Fine Speckled pattern is noted  This pattern suggests the  presence of DFS70 antibody which has a low prevalence in systemic  autoimmune rheumatic diseases  Note: Comment     A positive DEV result may occur in healthy individuals (low  titer) or be associated with a variety of diseases    See  interpretation chart which is not all inclusive:  Pattern      Antigen Detected  Suggested Disease Association  -----------  ----------------  -----------------------------  Homogeneous  DNA(ds,ss),       SLE - High titers               Nucleosomes,               Histones          Drug-induced SLE  -----------  ----------------  -----------------------------  Speckled     Sm, RNP, SCL-70,  SLE,MCTD,PSS (diffuse form),               SS-A/SS-B         Sjogrens  -----------  ----------------  -----------------------------  Nucleolar    SCL-70, PM-1/SCL  High titers Scleroderma,                                 PM/DM  -----------  ----------------  -----------------------------  Centromere   Centromere        PSS (limited form) w/Crest                                 syndrome variable  -----------  ----------------  -----------------------------  Nuclear Dot  [id number]  Primary Biliary Cirrhosis  -----------  ----------------  -----------------------------  Nuclear      ,            Primary Biliary Cirrhosis  Membrane     cassi A,B,C  -----------  ----------------  -----------------------------       Plan  Arthropathy of multiple sites    · 1 - Susan Higginbotham DO ( Rheumatology) Physician Referral  Consult Only: the  expectation is that the referring provider will communicate back to the patient on  treatment options  Evaluation and Treatment: the expectation is that the referred to  provider will communicate back to the patient on treatment options    Status: Active   Requested for: 03Apr2017  Care Summary provided  : Yes

## 2018-01-12 VITALS
HEART RATE: 95 BPM | HEIGHT: 65 IN | WEIGHT: 158 LBS | SYSTOLIC BLOOD PRESSURE: 119 MMHG | BODY MASS INDEX: 26.33 KG/M2 | RESPIRATION RATE: 16 BRPM | DIASTOLIC BLOOD PRESSURE: 81 MMHG

## 2018-01-13 VITALS
HEART RATE: 75 BPM | RESPIRATION RATE: 16 BRPM | HEIGHT: 64 IN | WEIGHT: 156 LBS | DIASTOLIC BLOOD PRESSURE: 86 MMHG | BODY MASS INDEX: 26.63 KG/M2 | SYSTOLIC BLOOD PRESSURE: 133 MMHG

## 2018-01-13 VITALS
HEIGHT: 64 IN | HEART RATE: 84 BPM | RESPIRATION RATE: 16 BRPM | WEIGHT: 156 LBS | SYSTOLIC BLOOD PRESSURE: 126 MMHG | BODY MASS INDEX: 26.63 KG/M2 | DIASTOLIC BLOOD PRESSURE: 74 MMHG | TEMPERATURE: 98.2 F

## 2018-01-14 VITALS
BODY MASS INDEX: 27.06 KG/M2 | WEIGHT: 158.5 LBS | SYSTOLIC BLOOD PRESSURE: 108 MMHG | HEART RATE: 92 BPM | DIASTOLIC BLOOD PRESSURE: 70 MMHG | TEMPERATURE: 98 F | HEIGHT: 64 IN | RESPIRATION RATE: 14 BRPM

## 2018-01-15 VITALS — HEIGHT: 64 IN | WEIGHT: 158 LBS | BODY MASS INDEX: 26.98 KG/M2

## 2018-01-15 NOTE — MISCELLANEOUS
Message   Recorded as Task   Date: 04/19/2016 08:43 AM, Created By: Sergei Jean   Task Name: Follow Up   Assigned To: Abiel Gonzalez   Regarding Patient: Johnson Norton, Status: Active   Comment:    Wiliam Qureshi - 19 Apr 2016 8:43 AM     TASK CREATED  Caller: Self; [phone] (Home)  NURSE/TRIAGE    Pt  states she has a cough and is now short of breath  Raul Barajas - 19 Apr 2016 8:47 AM     TASK EDITED  Appointment made this AM   She was seen and feeling no better  She feels short of breath, no fever, feeling weak  Abiel Gonzalez - 19 Apr 2016 4:54 PM     TASK EDITED  noted        Signatures   Electronically signed by : Jaquelin Martel DO;  Apr 19 2016  4:54PM EST                       (Author)

## 2018-01-17 NOTE — RESULT NOTES
Discussion/Summary   Your mammogram is normal   Repeat in 1 year is recommended  SHRAVAN Banuelos     Verified Results  * MAMMO SCREENING BILATERAL W CAD 71CKF1315 12:39PM Sandip Mcmanus Order Number: [id number]    - Patient Instructions: To schedule this appointment, please contact Central Scheduling at [phone]  Do not wear any perfume, powder, lotion or deodorant on breast or underarm area  Please bring your doctors order, referral (if needed) and insurance information with you on the day of the test  Failure to bring this information may result in this test being rescheduled  Arrive 15 minutes prior to your appointment time to register  On the day of your test, please bring any prior mammogram or breast studies with you that were not performed at a [facility]  Failure to bring prior exams may result in your test needing to be rescheduled  Test Name Result Flag Reference   MAMMO SCREENING BILATERAL W CAD (Report)     Patient History:   Patient is postmenopausal    Patient's BMI is 25 2  Reason for exam: screening, asymptomatic  Mammo Screening Bilateral W CAD: May 12, 2017 - Check In #:    [de-identified]   Bilateral CC and MLO view(s) were taken  Technologist: ANAIS Bowens   Prior study comparison: December 30, 2014, bilateral mammogram     November 28, 2012, bilateral mammogram      There are scattered fibroglandular densities  No dominant soft tissue mass, architectural distortion or    suspicious calcifications are noted in either breast  The skin    and nipple contours are within normal limits  No evidence of malignancy  No significant changes when compared with prior studies  ACR BI-RADSï¾® Assessments: BiRad:1 - Negative     Recommendation:   Routine screening mammogram of both breasts in 1 year  A    reminder letter will be scheduled  Analyzed by CAD     8-10% of cancers will be missed on mammography   Management of a    palpable abnormality must be based on clinical grounds  Patients   will be notified of their results via letter from our facility  Accredited by [employer] of Radiology and FDA       Transcription Location: [facility] [address]: [id number]     Risk Value(s):   Tyrer-Cuzick 10 Year: 3 600%, Tyrer-Cuzick Lifetime: 8 900%,    Myriad Table: 1 5%, MAXIMINO 5 Year: 1 0%, NCI Lifetime: 5 3%   Signed by:   Sreedhar Aponte MD   5/12/17       Signatures   Electronically signed by : Nelida Conklin; May 12 2017  2:25PM EST                       (Author)

## 2018-01-17 NOTE — CONSULTS
Chief Complaint  Here for pre opn clearance  Dr Beatriz Fulton is removing a lump on her left foot this Fri 5/26/17  She is having her Pre op testing done tomorrow at [facility]      History of Present Illness  Pre-Op Visit (Brief): The patient is being seen for a preoperative visit and Right foot surgery for plantar fibromatosis  Surgical Risk Assessment:   Prior Anesthesia: She had prior anesthesia, no prior adverse reaction to edidural anesthesia, no prior adverse reaction to spinal anesthesia and no prior adverse reaction to general anesthesia  Pertinent Past Medical History: no pertinent past medical history  Exercise Capacity: able to walk four blocks without symptoms and able to walk two flights of stairs without symptoms  Lifestyle Factors: denies alcohol use, denies tobacco use and denies illegal drug use  Symptoms: no easy bleeding, easy bruising, no frequent nosebleeds, no chest pain, no cough, no dyspnea, no edema, no palpitations and no wheezing  Pertinent Family History: stroke (Mother had mitral valve replacement and had stroke in 63's  )  Living Situation: home is secure and supportive  HPI: Patient here for pre operative clearance for right foot surgery for plantar fibromatosis  Denies any acute concerns and complaints at this time  Patient will be going for blood work, chest X-ray, EKG tomorrow at hospital       Review of Systems    Constitutional: as noted in HPI, no fever, not feeling poorly, no chills and not feeling tired  Eyes: eyes not red  ENT: no earache, no nosebleeds, no sore throat, no nasal discharge and no hoarseness  Cardiovascular: as noted in HPI, no chest pain and no palpitations  Respiratory: as noted in HPI, no shortness of breath, no cough and no orthopnea  Gastrointestinal: as noted in HPI, no abdominal pain, no nausea, no vomiting and no diarrhea  Genitourinary: as noted in HPI, no dysuria, no pelvic pain and no incontinence     Musculoskeletal: as noted in HPI, no arthralgias, no joint swelling, no limb pain, no joint stiffness and no limb swelling  Integumentary: as noted in HPI, no rashes, no itching and no skin lesions  Neurological: as noted in HPI, no headache, no numbness, no tingling, no confusion, no dizziness, no fainting and no difficulty walking  Psychiatric: not suicidal, no anxiety, no sleep disturbances and no depression  Endocrine: no muscle weakness  no feelings of weakness   Hematologic/Lymphatic: a tendency for easy bruising, but no tendency for easy bleeding  Active Problems    1  Acquired ankle/foot deformity (736 70) (M21 969)   2  Acute maxillary sinusitis, recurrence not specified (461 0) (J01 00)   3  Alcohol dependence in remission (303 93) (F10 21)   4  Allergic rhinitis (477 9) (J30 9)   5  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)   6  Depression (311) (F32 9)   7  Elevated glucose (790 29) (R73 09)   8  Encounter for colorectal cancer screening (V76 51) (Z12 11,Z12 12)   9  Encounter for screening mammogram for breast cancer (V76 12) (Z12 31)   10  Esophagitis (530 10) (K20 9)   11  Generalized abdominal pain (789 07) (R10 84)   12  Hearing loss (389 9) (H91 90)   13  Irritable bowel syndrome (564 1) (K58 9)   14  Malignant melanoma of skin (172 9) (C43 9)   15  Ocular migraine (346 80) (G43 109)   16  Osteopenia (733 90) (M85 80)   17  Pes planus, congenital (754 61) (Q66 50)   18  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)   19  Raynaud's syndrome without gangrene (443 0) (I73 00)    Past Medical History    · History of Lazy eye (368 00) (H53 009)    The active problems and past medical history were reviewed and updated today  Surgical History    The surgical history was reviewed and updated today  Family History    · Family history of arthritis (V17 7) (Z82 61)   · Family history of Stroke    · Family history of Hypertension    The family history was reviewed and updated today         Social History    · Does not drink alcohol (V49 89) (Z78 9)   · Never smoked  The social history was reviewed and updated today  The social history was reviewed and is unchanged  Current Meds   1  Calcium + D TABS; 2 daily; Therapy: (Recorded:96Hvc4359) to Recorded   2  HydrOXYzine HCl - 25 MG Oral Tablet; TAKE 1 TABLET TWICE DAILY; Therapy: 80STP7440 to (Evaluate:89Tla8473)  Requested for: 41XPL4092; Last   Rx:83Deq0153 Ordered   3  LevoFLOXacin 500 MG Oral Tablet; TAKE 1 TABLET DAILY; Therapy: 10CEW6623 to (Evaluate:61Yly6228)  Requested for: 15OXO3598; Last   Rx:51Krm7569 Ordered   4  Meloxicam 15 MG Oral Tablet; 1 daily prn with food; Therapy: 25AOD3670 to (Last BC:18YCD1741)  Requested for: 56QHQ7216 Ordered   5  Omeprazole 20 MG Oral Tablet Delayed Release; TAKE 1 TABLET  AS NEEDED    Requested for: 59JMI9502; Last Rx:52Cfl4471 Ordered   6  Oxycodone-Acetaminophen  MG Oral Tablet; TAKE 1 TABLET 4 TIMES DAILY AS   NEEDED FOR PAIN;   Therapy: 40FHQ8496 to (Evaluate:58Anz4451); Last Rx:45Tbf0531 Ordered    The medication list was reviewed and updated today  Allergies    1  DEMEROL   2  Sulfa Drugs    Vitals  Signs    Temperature: 97 3 F  Heart Rate: 80  Respiration: 16  Systolic: 706  Diastolic: 74  Height: 5 ft 4 5 in  Weight: 158 lb   BMI Calculated: 26 7  BSA Calculated: 1 78    Physical Exam    Constitutional   General appearance: No acute distress, well appearing and well nourished  Head and Face   Head and face: Normal     Eyes   Conjunctiva and lids: No swelling, erythema or discharge  Ophthalmoscopic examination: Normal fundi and optic discs  Ears, Nose, Mouth, and Throat   External inspection of ears and nose: Normal     Otoscopic examination: Tympanic membranes translucent with normal light reflex  Canals patent without erythema  Nasal mucosa, septum, and turbinates: Normal without edema or erythema  Oropharynx: Normal with no erythema, edema, exudate or lesions      Neck   Neck: Supple, symmetric, trachea midline, no masses  Thyroid: Normal, no thyromegaly  Pulmonary   Percussion of chest: Normal     Auscultation of lungs: Clear to auscultation  Cardiovascular   Auscultation of heart: Normal rate and rhythm, normal S1 and S2, no murmurs  Carotid pulses: 2+ bilaterally  Femoral pulses: 2+ bilaterally  Pedal pulses: 2+ bilaterally  Examination of extremities for edema and/or varicosities: Normal     Chest   Chest: Normal     Abdomen   Abdomen: Non-tender, no masses  Liver and spleen: No hepatomegaly or splenomegaly  Lymphatic   Palpation of lymph nodes in neck: No lymphadenopathy  Palpation of lymph nodes in axillae: No lymphadenopathy  Palpation of lymph nodes in groin: No lymphadenopathy  Musculoskeletal   Gait and station: Normal     Range of motion: Normal     Stability: Normal     Skin   Skin and subcutaneous tissue: Normal without rashes or lesions  Neurologic   Reflexes: 2+ and symmetric  Sensation: No sensory loss  Coordination: Normal finger to nose and heel to shin  Psychiatric   Judgment and insight: Normal     Orientation to person, place, and time: Normal     Recent and remote memory: Intact  Mood and affect: Normal        Assessment    1  Encounter for pre-operative examination (V72 84) (Z01 818)   2  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)   3  BMI 26 0-26 9,adult (V85 22) (Z68 26)   4  Family history of Stroke : Mother   5  Family history of Hypertension : Father    Plan  Encounter for pre-operative examination    · (1) PT WITH INR; Status:Active; Requested for:16Nfv9956; Will do blood work and chest X-ray and ekg tomorrow  Discussion/Summary  Surgical Clearance: She is at a LOW TO MODERATE risk from a cardiovascular standpoint at this time without any additional cardiac testing  Reevaluation needed, if she should present with symptoms prior to surgery/procedure  Comments:  family history of hypertension, stroke and valve replacement  Cleared for surgery pending lab work, X-ray and EKG report  PT/INR ordered for easy bruising  Will follow up with results  Blood work, chest X-ray and EKG reports received and evaluated  Patient is cleared for surgery  End of Encounter Meds    1  Meloxicam 15 MG Oral Tablet; 1 daily prn with food; Therapy: 31EYW1527 to (Last PF:94JWN1822)  Requested for: 92JPJ5697 Ordered    2  Omeprazole 20 MG Oral Tablet Delayed Release; TAKE 1 TABLET  AS NEEDED    Requested for: 23Oct2016; Last Rx:56Mnk4131 Ordered    3  HydrOXYzine HCl - 25 MG Oral Tablet; TAKE 1 TABLET TWICE DAILY; Therapy: 64GYO5214 to (Evaluate:28Mtw2423)  Requested for: 70HWR2353; Last   Rx:03Cfc6557 Ordered   4  LevoFLOXacin 500 MG Oral Tablet; TAKE 1 TABLET DAILY; Therapy: 17UOE6875 to (Evaluate:64Ksz7138)  Requested for: 97QPR8421; Last   Rx:79Lrv5051 Ordered   5  Oxycodone-Acetaminophen  MG Oral Tablet; TAKE 1 TABLET 4 TIMES DAILY AS   NEEDED FOR PAIN;   Therapy: 59ZYH7438 to (Evaluate:49Dfw6949); Last Rx:98Yva6215 Ordered    6  Calcium + D TABS; 2 daily;    Therapy: (Christine Newman) to Recorded    Signatures   Electronically signed by : Raegan Bliss DO; May 23 2017  9:58PM EST                       (Author)    Electronically signed by : Chio Beck; May 25 2017  3:28PM EST                       (Author)    Electronically signed by : Alexa Clay MD; May 25 2017  3:33PM EST                       (Author)

## 2018-01-18 NOTE — RESULT NOTES
Message   Discussed results w/ pt  Verified Results  (1) CBC/PLT/DIFF [id number] 11:12AM Crow Jessica     Test Name Result Flag Reference   WBC 4 6 x10E3/uL  3 4-10 8   RBC 4 91 x10E6/uL  3 77-5 28   Hemoglobin 13 9 g/dL  11 1-15 9   Hematocrit 41 3 %  34 0-46  6   MCV 84 fL  79-97   MCH 28 3 pg  26 6-33 0   Baso (Absolute) 0 0 x10E3/uL  0 0-0 2   Immature Granulocytes 0 %     Immature Grans (Abs) 0 0 x10E3/uL  0 0-0 1   Eos 2 %     Basos 0 %     Neutrophils (Absolute) 2 7 x10E3/uL  1 4-7 0   Lymphs (Absolute) 1 3 x10E3/uL  0 7-3 1   Monocytes(Absolute) 0 4 x10E3/uL  0 1-0 9   Eos (Absolute) 0 1 x10E3/uL  0 0-0 4   MCHC 33 7 g/dL  31 5-35 7   RDW 14 6 %  12 3-15 4   Platelets 765 F84M0/LL  150-379   Neutrophils 60 %     Lymphs 29 %     Monocytes 9 %       (1) COMPREHENSIVE METABOLIC PANEL [id number] 47:61OI Dorinda Davis     Test Name Result Flag Reference   Glucose, Serum 95 mg/dL  65-99   BUN 12 mg/dL  6-24   Creatinine, Serum 0 81 mg/dL  0 57-1 00   eGFR If NonAfricn Am 80 mL/min/1 73  >59   eGFR If Africn Am 92 mL/min/1 73  >59   BUN/Creatinine Ratio 15  9-23   ALT (SGPT) 18 IU/L  0-32   Albumin, Serum 4 3 g/dL  3 5-5 5   Globulin, Total 2 5 g/dL  1 5-4 5   A/G Ratio 1 7  1 1-2 5   Bilirubin, Total 0 2 mg/dL  0 0-1 2   Alkaline Phosphatase, S 56 IU/L     AST (SGOT) 22 IU/L  0-40   Sodium, Serum 140 mmol/L  134-144   Potassium, Serum 5 3 mmol/L H 3 5-5 2   Chloride, Serum 100 mmol/L     Carbon Dioxide, Total 25 mmol/L  18-29   Calcium, Serum 9 4 mg/dL  8 7-10 2   Protein, Total, Serum 6 8 g/dL  6 0-8 5     (LC) Sedimentation Rate-Westergren 10DZQ7282 11:12AM Dorinda Davis     Test Name Result Flag Reference   Sedimentation Rate-Westergren 8 mm/hr  0-40     () TSH Rfx on Abnormal to Free T4 73Ijx9998 11:12AM Dorinda Davis     Test Name Result Flag Reference   TSH 3 560 uIU/mL  0 450-4 500     () Lyme Ab/Western Blot Reflex [id number] 11:12AM Dorinda Bhatia     Test Name Result Flag Reference   Lyme IgG/IgM Ab <0 91 ISR  0 00-0 90   Negative         <0 91                                                 Equivocal  0 91 - 1 09                                                 Positive         >1 09   Lyme Disease Ab, Quant, IgM <0 80 index  0 00-0 79   Negative         <0 80                                                 Equivocal  0 80 - 1 19                                                 Positive         >1 19                  IgM levels may peak at 3-6 weeks post infection, then                  gradually decline  (LC) Antinuclear Antibodies, IFA 13TYL7080 11:12AM Shon Davisen     Test Name Result Flag Reference   Antinuclear Antibodies, IFA See patterns     Negative   <1:80                                                      Borderline  1:80                                                      Positive   >1:80   Homogeneous Pattern 1:320 H    Note: Comment     A positive DEV result may occur in healthy individuals or  be associated with a variety of diseases  See interpre-  tation below:  Pattern      Antigen Detected  Suggested Disease Association  -----------  ----------------  -----------------------------  Homogeneous  DNA(ds,ss,),      High titers - SLE  (Smooth)     Histone  -----------  ----------------  -----------------------------  Speckled     Sm, RNP, SCL-70,  SLE,MCTD,Scleroderma,Sjogrens               SS-A/SS-B  -----------  ----------------  -----------------------------  Nucleolar    SCL-70, PM-1/SCL  High titers Scleroderma Poly-                                 myositis/Scleroderma Overlap  -----------  ----------------  -----------------------------  Centromere   Centromere        PSS w/Crest syndrome variable  -----------  ---------------- ------------------------------       Signatures   Electronically signed by : Brandyn Wolf;  Aug  6 2016  9:09AM EST                       (Author)

## 2018-01-22 VITALS
SYSTOLIC BLOOD PRESSURE: 122 MMHG | BODY MASS INDEX: 26.33 KG/M2 | HEIGHT: 65 IN | TEMPERATURE: 97.3 F | DIASTOLIC BLOOD PRESSURE: 74 MMHG | WEIGHT: 158 LBS | HEART RATE: 80 BPM | RESPIRATION RATE: 16 BRPM

## 2018-01-23 VITALS
HEIGHT: 64 IN | DIASTOLIC BLOOD PRESSURE: 70 MMHG | RESPIRATION RATE: 20 BRPM | WEIGHT: 164 LBS | BODY MASS INDEX: 28 KG/M2 | TEMPERATURE: 96.9 F | SYSTOLIC BLOOD PRESSURE: 116 MMHG | HEART RATE: 72 BPM

## 2018-01-23 NOTE — PROGRESS NOTES
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)  3  BMI 26 0-26 9,adult (V85 22) (Z68 26)  4  Family history of Stroke : Mother  5  Family history of Hypertension : Father    Plan  Encounter for pre-operative examination    · (1) PT WITH INR; Status:Active; Requested for:24Pux8111; Will do blood work and chest X-ray and ekg tomorrow  Discussion/Summary  Surgical Clearance: She is at a LOW TO MODERATE risk from a cardiovascular standpoint at this time without any additional cardiac testing  Reevaluation needed, if she should present with symptoms prior to surgery/procedure  Comments:  family history of hypertension, stroke and valve replacement  Cleared for surgery pending lab work, X-ray and EKG report  PT/INR ordered for easy bruising  Will follow up with results  Blood work, chest X-ray and EKG reports received and evaluated  Patient is cleared for surgery  1        1 Amended By: Trae Manriquez; May 25 2017 3:27 PM EST    Chief Complaint  Here for pre opn clearance  Dr Niles Mcardle is removing a lump on her left foot this Fri 5/26/17  She is having her Pre op testing done tomorrow at [facility]      History of Present Illness  Pre-Op Visit (Brief): The patient is being seen for a preoperative visit and Right foot surgery for plantar fibromatosis  Surgical Risk Assessment:   Prior Anesthesia: She had prior anesthesia, no prior adverse reaction to edidural anesthesia, no prior adverse reaction to spinal anesthesia and no prior adverse reaction to general anesthesia  Pertinent Past Medical History: no pertinent past medical history  Exercise Capacity: able to walk four blocks without symptoms and able to walk two flights of stairs without symptoms  Lifestyle Factors: denies alcohol use, denies tobacco use and denies illegal drug use   Symptoms: no easy bleeding, easy bruising, no frequent nosebleeds, no chest pain, no cough, no dyspnea, no edema, no palpitations and no wheezing  Pertinent Family History: stroke (Mother had mitral valve replacement and had stroke in 63's  )  Living Situation: home is secure and supportive  HPI: Patient here for pre operative clearance for right foot surgery for plantar fibromatosis  Denies any acute concerns and complaints at this time  Patient will be going for blood work, chest X-ray, EKG tomorrow at hospital       Review of Systems    Constitutional: as noted in HPI, no fever, not feeling poorly, no chills and not feeling tired  Eyes: eyes not red  ENT: no earache, no nosebleeds, no sore throat, no nasal discharge and no hoarseness  Cardiovascular: as noted in HPI, no chest pain and no palpitations  Respiratory: as noted in HPI, no shortness of breath, no cough and no orthopnea  Gastrointestinal: as noted in HPI, no abdominal pain, no nausea, no vomiting and no diarrhea  Genitourinary: as noted in HPI, no dysuria, no pelvic pain and no incontinence  Musculoskeletal: as noted in HPI, no arthralgias, no joint swelling, no limb pain, no joint stiffness and no limb swelling  Integumentary: as noted in HPI, no rashes, no itching and no skin lesions  Neurological: as noted in HPI, no headache, no numbness, no tingling, no confusion, no dizziness, no fainting and no difficulty walking  Psychiatric: not suicidal, no anxiety, no sleep disturbances and no depression  Endocrine: no muscle weakness  no feelings of weakness   Hematologic/Lymphatic: a tendency for easy bruising, but no tendency for easy bleeding  Active Problems   1  Acquired ankle/foot deformity (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 70) (M21 969)  2  Acute maxillary sinusitis, recurrence not specified (461 0) (J01 00)  3  Alcohol dependence in remission (303 93) (F10 21)  4  Allergic rhinitis (477 9) (J30 9)  5  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)  6  Depression (311) (F32 9)  7  Elevated glucose (790 29) (R73 09)  8   Encounter for colorectal cancer screening (V76 51) (Z12 11,Z12 12)  9  Encounter for screening mammogram for breast cancer (V76 12) (Z12 31)  10  Esophagitis (530 10) (K20 9)  11  Generalized abdominal pain (789 07) (R10 84)  12  Hearing loss (389 9) (H91 90)  13  Irritable bowel syndrome (564 1) (K58 9)  14  Malignant melanoma of skin (172 9) (C43 9)  15  Ocular migraine (346 80) (G43 109)  16  Osteopenia (733 90) (M85 80)  17  Pes planus, congenital (754 61) (Q66 50)  18  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)  19  Raynaud's syndrome without gangrene (443 0) (I73 00)    Past Medical History    · History of Lazy eye (368 00) (H53 009)    The active problems and past medical history were reviewed and updated today  Surgical History    The surgical history was reviewed and updated today  Family History  Mother    · Family history of arthritis (V17 7) (Z82 61)   · Family history of Stroke  Father    · Family history of Hypertension    The family history was reviewed and updated today  Social History    · Does not drink alcohol (V49 89) (Z78 9)   · Never smoked  The social history was reviewed and updated today  The social history was reviewed and is unchanged  Current Meds  1  Calcium + D TABS; 2 daily; Therapy: (Recorded:50Psk2611) to Recorded  2  HydrOXYzine HCl - 25 MG Oral Tablet; TAKE 1 TABLET TWICE DAILY; Therapy: 40DAU9754 to (Evaluate:61New6464)  Requested for: 77AEI0919; Last   Rx:18May2017 Ordered  3  LevoFLOXacin 500 MG Oral Tablet; TAKE 1 TABLET DAILY; Therapy: 50MLL0164 to (Evaluate:97Ftx1966)  Requested for: 38CJW2434; Last   Rx:09Pec1373 Ordered  4  Meloxicam 15 MG Oral Tablet; 1 daily prn with food; Therapy: 56RJI0394 to (Last VO:62NZH3104)  Requested for: 70RTD2222 Ordered  5  Omeprazole 20 MG Oral Tablet Delayed Release; TAKE 1 TABLET  AS NEEDED    Requested for: 15HJK6751; Last Rx:23Oct2016 Ordered  6   Oxycodone-Acetaminophen  MG Oral Tablet; TAKE 1 TABLET 4 TIMES DAILY AS NEEDED FOR PAIN;   Therapy: 66OQL7193 to (Evaluate:44Bvl2535); Last Rx:96Zuh6240 Ordered    The medication list was reviewed and updated today  Allergies   1  DEMEROL  2  Sulfa Drugs    Vitals   Recorded: 45VXU6644 10:22AM   Temperature 97 3 F   Heart Rate 80   Respiration 16   Systolic 431   Diastolic 74   Height 5 ft 4 5 in   Weight 158 lb    BMI Calculated 26 7   BSA Calculated 1 78     Physical Exam    Constitutional   General appearance: No acute distress, well appearing and well nourished  Head and Face   Head and face: Normal     Eyes   Conjunctiva and lids: No swelling, erythema or discharge  Ophthalmoscopic examination: Normal fundi and optic discs  Ears, Nose, Mouth, and Throat   External inspection of ears and nose: Normal     Otoscopic examination: Tympanic membranes translucent with normal light reflex  Canals patent without erythema  Nasal mucosa, septum, and turbinates: Normal without edema or erythema  Oropharynx: Normal with no erythema, edema, exudate or lesions  Neck   Neck: Supple, symmetric, trachea midline, no masses  Thyroid: Normal, no thyromegaly  Pulmonary   Percussion of chest: Normal     Auscultation of lungs: Clear to auscultation  Cardiovascular   Auscultation of heart: Normal rate and rhythm, normal S1 and S2, no murmurs  Carotid pulses: 2+ bilaterally  Femoral pulses: 2+ bilaterally  Pedal pulses: 2+ bilaterally  Examination of extremities for edema and/or varicosities: Normal     Chest   Chest: Normal     Abdomen   Abdomen: Non-tender, no masses  Liver and spleen: No hepatomegaly or splenomegaly  Lymphatic   Palpation of lymph nodes in neck: No lymphadenopathy  Palpation of lymph nodes in axillae: No lymphadenopathy  Palpation of lymph nodes in groin: No lymphadenopathy      Musculoskeletal   Gait and station: Normal     Range of motion: Normal     Stability: Normal     Skin   Skin and subcutaneous tissue: Normal without rashes or lesions  Neurologic   Reflexes: 2+ and symmetric  Sensation: No sensory loss  Coordination: Normal finger to nose and heel to shin  Psychiatric   Judgment and insight: Normal     Orientation to person, place, and time: Normal     Recent and remote memory: Intact  Mood and affect: Normal        Attending Note  Collaborating Physician Note: Collaborating Physician:1  I agree with the Advanced Practitioner note1         1 Amended By: Mirtha Mora ; May 23 2017 9:58 PM EST    End of Encounter Meds   1  Meloxicam 15 MG Oral Tablet; 1 daily prn with food; Therapy: 38VCH9194 to (Last IB:79ABC0580)  Requested for: 76SMF8626 Ordered   2  Omeprazole 20 MG Oral Tablet Delayed Release; TAKE 1 TABLET  AS NEEDED    Requested for: 23Oct2016; Last Rx:55Vjg2695 Ordered   3  HydrOXYzine HCl - 25 MG Oral Tablet; TAKE 1 TABLET TWICE DAILY; Therapy: 15XTD1602 to (Evaluate:03Pdv8495)  Requested for: 32GAD2154; Last   Rx:85Ivq2302 Ordered  4  LevoFLOXacin 500 MG Oral Tablet; TAKE 1 TABLET DAILY; Therapy: 02ZVA9520 to (Evaluate:75Xgi2265)  Requested for: 72GWE5991; Last   Rx:55Uam6771 Ordered  5  Oxycodone-Acetaminophen  MG Oral Tablet; TAKE 1 TABLET 4 TIMES DAILY AS   NEEDED FOR PAIN;   Therapy: 37HYL2486 to (Evaluate:58Ngb5535); Last Rx:16Osh2975 Ordered   6  Calcium + D TABS; 2 daily;    Therapy: (Recorded:84Diw3975) to Recorded    Future Appointments    Date/Time Provider Specialty Site   05/26/2017 02:00 PM Alberto Pollock DPM Podiatry [facility]   05/30/2017 02:00 PM Alberto Pollock DPM [facility]iat[facility]   07/24/2017 01:40 PM Misha Sterling DO Rheumatology [facility] [address] [facility]     Signatures   Electronically signed by : Robbie Manzano DO; May 23 2017  9:58PM EST                       (Author)    Electronically signed by : Alphonso Davidson; May 25 2017  3:28PM EST                       (Author)    Electronically signed by : Alejandro Johnson MD; May 25 2017  3:33PM EST                       (Author)

## 2018-02-05 ENCOUNTER — TRANSCRIBE ORDERS (OUTPATIENT)
Dept: ADMINISTRATIVE | Facility: HOSPITAL | Age: 63
End: 2018-02-05

## 2018-02-05 ENCOUNTER — HOSPITAL ENCOUNTER (OUTPATIENT)
Dept: RADIOLOGY | Facility: HOSPITAL | Age: 63
Discharge: HOME | End: 2018-02-05
Attending: INTERNAL MEDICINE
Payer: COMMERCIAL

## 2018-02-05 ENCOUNTER — OFFICE VISIT (OUTPATIENT)
Dept: FAMILY MEDICINE CLINIC | Facility: CLINIC | Age: 63
End: 2018-02-05
Payer: COMMERCIAL

## 2018-02-05 VITALS
HEIGHT: 65 IN | DIASTOLIC BLOOD PRESSURE: 98 MMHG | BODY MASS INDEX: 27.32 KG/M2 | SYSTOLIC BLOOD PRESSURE: 146 MMHG | WEIGHT: 164 LBS | HEART RATE: 72 BPM | RESPIRATION RATE: 16 BRPM | TEMPERATURE: 97.1 F

## 2018-02-05 DIAGNOSIS — M25.562 ACUTE PAIN OF LEFT KNEE: Primary | ICD-10-CM

## 2018-02-05 DIAGNOSIS — M25.562 ACUTE PAIN OF LEFT KNEE: ICD-10-CM

## 2018-02-05 DIAGNOSIS — N30.00 ACUTE CYSTITIS WITHOUT HEMATURIA: ICD-10-CM

## 2018-02-05 PROBLEM — M85.80 OSTEOPENIA: Status: ACTIVE | Noted: 2017-03-27

## 2018-02-05 LAB
SL AMB  POCT GLUCOSE, UA: ABNORMAL
SL AMB LEUKOCYTE ESTERASE,UA: 500
SL AMB POCT BILIRUBIN,UA: ABNORMAL
SL AMB POCT BLOOD,UA: ABNORMAL
SL AMB POCT CLARITY,UA: CLEAR
SL AMB POCT COLOR,UA: YELLOW
SL AMB POCT KETONES,UA: ABNORMAL
SL AMB POCT NITRITE,UA: ABNORMAL
SL AMB POCT PH,UA: 5
SL AMB POCT SPECIFIC GRAVITY,UA: 1.02

## 2018-02-05 PROCEDURE — 99213 OFFICE O/P EST LOW 20 MIN: CPT | Performed by: INTERNAL MEDICINE

## 2018-02-05 PROCEDURE — 73562 X-RAY EXAM OF KNEE 3: CPT

## 2018-02-05 PROCEDURE — 81002 URINALYSIS NONAUTO W/O SCOPE: CPT | Performed by: INTERNAL MEDICINE

## 2018-02-05 RX ORDER — EPINEPHRINE 0.3 MG/.3ML
INJECTION SUBCUTANEOUS
Refills: 0 | COMMUNITY
Start: 2017-11-15 | End: 2019-02-11

## 2018-02-05 RX ORDER — OMEPRAZOLE 40 MG/1
CAPSULE, DELAYED RELEASE ORAL
Refills: 0 | COMMUNITY
Start: 2018-01-31 | End: 2018-03-03 | Stop reason: SDUPTHER

## 2018-02-05 RX ORDER — LANOLIN ALCOHOL/MO/W.PET/CERES
CREAM (GRAM) TOPICAL DAILY
COMMUNITY
End: 2019-02-13

## 2018-02-05 RX ORDER — CIPROFLOXACIN 250 MG/1
250 TABLET, FILM COATED ORAL EVERY 12 HOURS SCHEDULED
Qty: 6 TABLET | Refills: 0 | Status: SHIPPED | OUTPATIENT
Start: 2018-02-05 | End: 2018-02-08

## 2018-02-05 RX ORDER — MELOXICAM 15 MG/1
15 TABLET ORAL DAILY
COMMUNITY
End: 2018-04-19 | Stop reason: ALTCHOICE

## 2018-02-05 NOTE — ASSESSMENT & PLAN NOTE
Advised meloxicam (has at home from rheumatologist) for 4-7 days for anti inflammatory effect  Check xray as above    Suspect arthritis due to exam

## 2018-02-05 NOTE — PROGRESS NOTES
Subjective:      Patient ID: Barbi Schwarz is a 65 y o  female  Chief Complaint   Patient presents with    Leg Pain     left leg       Started 3 days ago with pain down lateral side of leg, around knee and just distal  No new activity or injury, no fall  Back does not hurt  No swelling in leg  Pain is intense  Okay with activity but worse with rest   Tried meloxicam and it did not help  Also having in L arm around elbow  No rash or redness, no fever or headache  Also has urinary urgency and frequency for past 3 days  No dysuria, fever, back pain  Leg Pain          The following portions of the patient's history were reviewed and updated as appropriate: allergies, current medications, past family history, past medical history, past social history, past surgical history and problem list     Review of Systems   Constitutional: Negative  Genitourinary: Positive for frequency and urgency  Musculoskeletal:        L leg pain as above         Current Outpatient Prescriptions   Medication Sig Dispense Refill    calcium citrate-vitamin D (CITRACAL+D) 315-200 MG-UNIT per tablet Take by mouth daily      EPINEPHrine (EPIPEN) 0 3 mg/0 3 mL SOAJ USE AS DIRECTED IN CASE OF SEVERE ALLERGIC REACTION- SHORTNESS OF   (REFER TO PRESCRIPTION NOTES)  0    meloxicam (MOBIC) 15 mg tablet Take 15 mg by mouth daily      omeprazole (PriLOSEC) 40 MG capsule   0    ciprofloxacin (CIPRO) 250 mg tablet Take 1 tablet (250 mg total) by mouth every 12 (twelve) hours for 3 days 6 tablet 0    omeprazole (PriLOSEC) 20 mg delayed release capsule Take 40 mg by mouth as needed         No current facility-administered medications for this visit  Objective:    /98   Pulse 72   Temp (!) 97 1 °F (36 2 °C)   Resp 16   Ht 5' 5" (1 651 m)   Wt 74 4 kg (164 lb)   BMI 27 29 kg/m²        Physical Exam   Constitutional: She appears well-developed and well-nourished  No distress     HENT:   Head: Normocephalic and atraumatic  Eyes: Conjunctivae are normal    Cardiovascular: Normal rate, regular rhythm and normal heart sounds  Pulmonary/Chest: Effort normal and breath sounds normal  She has no wheezes  She has no rales  Abdominal: Soft  Bowel sounds are normal    Musculoskeletal: Normal range of motion  She exhibits no edema  Left knee: She exhibits deformity and bony tenderness  She exhibits no effusion  Tenderness found  Lateral joint line tenderness noted  L knee FROM, tenderness with palpation of lateral aspect of knee joint with bony abnormality noted  Lymphadenopathy:     She has no cervical adenopathy  Nursing note and vitals reviewed  Assessment/Plan:    Acute pain of left knee  Advised meloxicam (has at home from rheumatologist) for 4-7 days for anti inflammatory effect  Check xray as above  Suspect arthritis due to exam      Acute cystitis without hematuria  Advised increased fluids and call for any fever or back pain  Diagnoses and all orders for this visit:    Acute pain of left knee  -     XR knee 3 vw left non injury; Future    Acute cystitis without hematuria  -     POCT urine dip  -     Urine culture; Future  -     ciprofloxacin (CIPRO) 250 mg tablet; Take 1 tablet (250 mg total) by mouth every 12 (twelve) hours for 3 days    Other orders  -     calcium citrate-vitamin D (CITRACAL+D) 315-200 MG-UNIT per tablet; Take by mouth daily  -     EPINEPHrine (EPIPEN) 0 3 mg/0 3 mL SOAJ; USE AS DIRECTED IN CASE OF SEVERE ALLERGIC REACTION- SHORTNESS OF   (REFER TO PRESCRIPTION NOTES)  -     omeprazole (PriLOSEC) 40 MG capsule;   -     meloxicam (MOBIC) 15 mg tablet; Take 15 mg by mouth daily          There are no Patient Instructions on file for this visit  Return if symptoms worsen or fail to improve         Jayjay Tinajero MD

## 2018-02-14 LAB
BACTERIA UR CULT: NORMAL
LABORATORY COMMENT REPORT: NORMAL
Lab: NO GROWTH

## 2018-02-15 ENCOUNTER — OFFICE VISIT (OUTPATIENT)
Dept: PODIATRY | Facility: CLINIC | Age: 63
End: 2018-02-15
Payer: COMMERCIAL

## 2018-02-15 VITALS — HEIGHT: 65 IN | BODY MASS INDEX: 25.83 KG/M2 | WEIGHT: 155 LBS

## 2018-02-15 DIAGNOSIS — G57.61 LESION OF RIGHT PLANTAR NERVE: ICD-10-CM

## 2018-02-15 DIAGNOSIS — M79.672 PAIN IN BOTH FEET: ICD-10-CM

## 2018-02-15 DIAGNOSIS — G57.62 MORTON'S NEUROMA OF THIRD INTERSPACE OF LEFT FOOT: ICD-10-CM

## 2018-02-15 DIAGNOSIS — Q66.50 CONGENITAL PES PLANUS, UNSPECIFIED LATERALITY: Primary | ICD-10-CM

## 2018-02-15 DIAGNOSIS — M79.671 PAIN IN BOTH FEET: ICD-10-CM

## 2018-02-15 PROCEDURE — 64455 NJX AA&/STRD PLTR COM DG NRV: CPT | Performed by: PODIATRIST

## 2018-02-15 PROCEDURE — 99212 OFFICE O/P EST SF 10 MIN: CPT | Performed by: PODIATRIST

## 2018-02-15 PROCEDURE — 73620 X-RAY EXAM OF FOOT: CPT | Performed by: PODIATRIST

## 2018-02-15 RX ORDER — MELOXICAM 7.5 MG/1
7.5 TABLET ORAL DAILY
Qty: 10 TABLET | Refills: 0 | Status: SHIPPED | OUTPATIENT
Start: 2018-02-15 | End: 2018-03-03 | Stop reason: ALTCHOICE

## 2018-02-15 NOTE — PROGRESS NOTES
Assessment/Plan:  Neuroma bilateral   Pes planus  X-rays taken  Bilateral nerve block done  1 cc of Kenalog 10, 2 cc of 2% lidocaine plain was injected to 3rd space bilateral   Patient return for orthotics  We will add anti-inflammatory medicine   There are no diagnoses linked to this encounter  Subjective:     Patient ID: David Meraz is a 65 y o  female  Patient has a new problem  Patient now is pain in the ball of her feet  She has bilateral neuroma  She has been diagnosed with this several years prior  He has had a recent flare-up  No history of trauma        Review of Systems      Objective:  Review of Systems     Constitutional: feeling kfwlleSVg0gbujtya tired  Musculoskeletal: as noted in HPI  Active Problems  1  Acquired ankle/foot deformity (736 70) (M21 969)   2  Acute maxillary sinusitis, recurrence not specified (461 0) (J01 00)   3  Alcohol dependence in remission (303 93) (F10 21)   4  Allergic rhinitis (477 9) (J30 9)   5  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)   6  BMI 26 0-26 9,adult (V85 22) (Z68 26)   7  Depression (311) (F32 9)   8  Difficulty walking (719 7) (R26 2)   9  Elevated glucose (790 29) (R73 09)   10  Encounter for colorectal cancer screening (V76 51) (Z12 11,Z12 12)   11  Encounter for pre-operative examination (V72 84) (Z01 818)   12  Encounter for screening mammogram for breast cancer (V76 12) (Z12 31)   13  Esophagitis (530 10) (K20 9)   14  Ganglion of joint (727 41) (M67 40)   15  Generalized abdominal pain (789 07) (R10 84)   16  Hearing loss (389 9) (H91 90)   17  Irritable bowel syndrome (564 1) (K58 9)   18  Malignant melanoma of skin (172 9) (C43 9)   19  Ocular migraine (346 80) (G43 109)   20  Osteopenia (733 90) (M85 80)   21  Pes planus, congenital (754 61) (Q66 50)   22  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)   23  Raynaud's syndrome without gangrene (443 0) (I73 00)   24   Urticaria (708 9) (L50 9)     Past Medical History   · History of Lazy eye (368 00) (H53 009)     The active problems and past medical history were reviewed and updated today  Surgical History     The surgical history was reviewed and updated today  Family History  Mother    · Family history of arthritis (V17 7) (Z82 61)   · Family history of Stroke  Father    · Family history of Hypertension     The family history was reviewed and updated today  Social History   · Does not drink alcohol (V49 89) (Z78 9)   · Never smoked  The social history was reviewed and updated today  The social history was reviewed and is unchanged  Current Meds   1  Calcium + D TABS; 2 daily; Therapy: (Recorded:92Hun7684) to Recorded   2  Levocetirizine Dihydrochloride 5 MG Oral Tablet; take 1 tablet every day; Therapy: 69EVQ5568 to (Evaluate:63Tpg8753)  Requested for: 33ZYJ2202; Last   Rx:19Jun2017 Ordered   3  Oxycodone-Acetaminophen  MG Oral Tablet; TAKE 1 TABLET 4 TIMES DAILY AS   NEEDED FOR PAIN;   Therapy: 49GZG8242 to (Evaluate:28May2017); Last Rx:18May2017 Ordered   4  PredniSONE 10 MG Oral Tablet; Take 1 tablet daily; Therapy: 07KWU7950 to (Evaluate:24Jun2017)  Requested for: 01VEO9109; Last   Rx:19Jun2017 Ordered   5  RaNITidine HCl - 150 MG Oral Tablet; TAKE 1 TABLET EVERY 12 HOURS DAILY; Therapy: 95YKV8150 to (Evaluate:63Oxm1694)  Requested for: 28MMH4318; Last   Rx:19Jun2017 Ordered     The medication list was reviewed and updated today  Allergies  1  DEMEROL   2  Sulfa Drugs     Vitals    Recorded: 42KIX4077 01:13PM   Heart Rate 75   Respiration 16   Systolic 535   Diastolic 86   Height 5 ft 4 in   Weight 156 lb    BMI Calculated 26 78   BSA Calculated 1 76      Physical Exam  Left Foot: Appearance: Normal except as noted: excessive vcjucpaqkRGs2voo planus  Great toe deformities include a bunion  Right Foot: Appearance: Normal except as noted: excessive lvlsrgwrwCHc5vte planus  Great toe deformities include a bunion     Left Ankle: ROM: Full  Motor: Normal    Right Ankle: ROM: Full  Motor: Normal    Neurological Exam: performed  Light touch was intact bilaterally  Vibratory sensation was intact bilaterally  Response to monofilament test was intact bilaterally  Deep tendon reflexes: patellar reflex present zhshpmvmvcmDQh5xuqdivto reflex present bilaterally  Vascular Exam: performed Dorsalis pedis pulses were present bilaterally  Posterior tibial pulses were present bilaterally  Elevation Pallor: absent bilaterally  Dependence rubor was absent bilaterally  Edema: none  Toenails: All of the toenails were tlhjnicsbTBe1uxbgtjctlnllg  Hyperkeratosis: present on both first toes  Shoe Gear Evaluation: performed ()  Recommendation(s): custom inlays  Foot Exam    General  General Appearance: appears stated age and healthy   Orientation: alert and oriented to person, place, and time   Affect: appropriate       Right Foot/Ankle     Neurovascular  Dorsalis pedis: 3+  Posterior tibial: 3+    Tests  Heel raise: pain     Comments  Positive Frederick sign 3rd intermetatarsal space noted bilateral       X-ray demonstrates no evidence of fracture or bony mass    Left Foot/Ankle      Neurovascular  Dorsalis pedis: 3+  Posterior tibial: 3+    Tests  Heel raise: pain       Physical Exam   Cardiovascular:   Pulses:       Dorsalis pedis pulses are 3+ on the right side, and 3+ on the left side  Posterior tibial pulses are 3+ on the right side, and 3+ on the left side

## 2018-02-28 ENCOUNTER — OFFICE VISIT (OUTPATIENT)
Dept: PODIATRY | Facility: CLINIC | Age: 63
End: 2018-02-28
Payer: COMMERCIAL

## 2018-02-28 VITALS
RESPIRATION RATE: 17 BRPM | BODY MASS INDEX: 25.83 KG/M2 | WEIGHT: 155 LBS | SYSTOLIC BLOOD PRESSURE: 132 MMHG | DIASTOLIC BLOOD PRESSURE: 86 MMHG | HEIGHT: 65 IN | HEART RATE: 17 BPM

## 2018-02-28 DIAGNOSIS — G57.61 LESION OF RIGHT PLANTAR NERVE: ICD-10-CM

## 2018-02-28 DIAGNOSIS — M79.671 RIGHT FOOT PAIN: ICD-10-CM

## 2018-02-28 DIAGNOSIS — M54.16 RADICULOPATHY OF LUMBAR REGION: Primary | ICD-10-CM

## 2018-02-28 DIAGNOSIS — A69.23 LYME ARTHRITIS (HCC): ICD-10-CM

## 2018-02-28 PROCEDURE — 99212 OFFICE O/P EST SF 10 MIN: CPT | Performed by: PODIATRIST

## 2018-02-28 PROCEDURE — 64455 NJX AA&/STRD PLTR COM DG NRV: CPT | Performed by: PODIATRIST

## 2018-02-28 RX ORDER — GABAPENTIN 100 MG/1
100 CAPSULE ORAL 3 TIMES DAILY
Qty: 90 CAPSULE | Refills: 0 | Status: SHIPPED | OUTPATIENT
Start: 2018-02-28 | End: 2019-04-11 | Stop reason: ALTCHOICE

## 2018-02-28 RX ORDER — ETODOLAC 400 MG/1
400 TABLET, FILM COATED ORAL 2 TIMES DAILY
Qty: 60 TABLET | Refills: 0 | Status: SHIPPED | OUTPATIENT
Start: 2018-02-28 | End: 2019-02-11

## 2018-02-28 NOTE — PROGRESS NOTES
Assessment/Plan:  Patient has neuroma right foot  We will rule out sural positive arthropathy  Possible Lyme disease  Possible radiculopathy  Plan  Right foot trigger point injection done  1 cc of Kenalog 10 with 2 cc of 2% lidocaine plain was injected into 3rd right intermetatarsal space  We will add Lodine  We will add gabapentin  Blood work ordered  Patient has been referred Rheumatology    No problem-specific Assessment & Plan notes found for this encounter  There are no diagnoses linked to this encounter  Subjective:      Patient ID: Maya Roth is a 65 y o  female  Patient has return of right foot pain  She is requesting injection therapy  The following portions of the patient's history were reviewed and updated as appropriate: allergies, current medications, past family history, past medical history, past social history, past surgical history and problem list     Review of Systems      Objective: Foot ExamPhysical Exam    Objective:  Review of Systems     Constitutional: feeling pluifhMAd9fmppqpy tired     Musculoskeletal: as noted in HPI       Active Problems  1  Acquired ankle/foot deformity (736 70) (M21 969)   2  Acute maxillary sinusitis, recurrence not specified (461 0) (J01 00)   3  Alcohol dependence in remission (303 93) (F10 21)   4  Allergic rhinitis (477 9) (J30 9)   5  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)   6  BMI 26 0-26 9,adult (V85 22) (Z68 26)   7  Depression (311) (F32 9)   8  Difficulty walking (719 7) (R26 2)   9  Elevated glucose (790 29) (R73 09)   10  Encounter for colorectal cancer screening (V76 51) (Z12 11,Z12 12)   11  Encounter for pre-operative examination (V72 84) (Z01 818)   12  Encounter for screening mammogram for breast cancer (V76 12) (Z12 31)   13  Esophagitis (530 10) (K20 9)   14  Ganglion of joint (727 41) (M67 40)   15  Generalized abdominal pain (789 07) (R10 84)   16  Hearing loss (389 9) (H91 90)   17  Irritable bowel syndrome (564 1) (K58 9)   18  Malignant melanoma of skin (172 9) (C43 9)   19  Ocular migraine (346 80) (G43 109)   20  Osteopenia (733 90) (M85 80)   21  Pes planus, congenital (754 61) (Q66 50)   22  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)   23  Raynaud's syndrome without gangrene (443 0) (I73 00)   24  Urticaria (708 9) (L50 9)     Past Medical History   · History of Lazy eye (368 00) (H53 009)     The active problems and past medical history were reviewed and updated today       Surgical History     The surgical history was reviewed and updated today        Family History  Mother    · Family history of arthritis (V17 7) (Z82 61)   · Family history of Stroke  Father    · Family history of Hypertension     The family history was reviewed and updated today        Social History   · Does not drink alcohol (V49 89) (Z78 9)   · Never smoked  The social history was reviewed and updated today  The social history was reviewed and is unchanged       Current Meds   1  Calcium + D TABS; 2 daily;   Therapy: (Haseeb Alan) to Recorded   2  Levocetirizine Dihydrochloride 5 MG Oral Tablet; take 1 tablet every day;   Therapy: 34EII6484 to (Evaluate:45Omk6949)  Requested for: 02QEA4717; Last   Rx:19Jun2017 Ordered   3  Oxycodone-Acetaminophen  MG Oral Tablet; TAKE 1 TABLET 4 TIMES DAILY AS   NEEDED FOR PAIN;  Schneider Simple: 94YMO6122 to (Evaluate:34Uhe7985); Last Rx:18May2017 Ordered   4  PredniSONE 10 MG Oral Tablet;  Take 1 tablet daily;   Therapy: 31XVU2418 to (Evaluate:24Jun2017)  Requested for: 65ONI4749; Last   Rx:19Jun2017 Ordered   5  RaNITidine HCl - 150 MG Oral Tablet; TAKE 1 TABLET EVERY 12 HOURS DAILY;   Therapy: 33FQT6789 to (Evaluate:69Sme6511)  Requested for: 27BVD0465; Last   Rx:19Jun2017 Ordered     The medication list was reviewed and updated today       Allergies  1  DEMEROL   2  Sulfa Drugs     Vitals    Recorded: 50ISE1699 01:13PM   Heart Rate 75   Respiration 16   Systolic 389   Diastolic 86 Height 5 ft 4 in   Weight 156 lb    BMI Calculated 26 78   BSA Calculated 1 76      Physical Exam  Left Foot: Appearance: Normal except as noted: excessive xnvtsaickICv0hzq planus  Great toe deformities include a bunion  Right Foot: Appearance: Normal except as noted: excessive nnxeyrwtfAQq3ino planus  Great toe deformities include a bunion  Left Ankle: ROM: Full  Motor: Normal    Right Ankle: ROM: Full  Motor: Normal    Neurological Exam: performed  Light touch was intact bilaterally  Vibratory sensation was intact bilaterally  Response to monofilament test was intact bilaterally  Deep tendon reflexes: patellar reflex present ahfgqxypqcrCPw3heypetlt reflex present bilaterally  Vascular Exam: performed Dorsalis pedis pulses were present bilaterally  Posterior tibial pulses were present bilaterally  Elevation Pallor: absent bilaterally  Dependence rubor was absent bilaterally  Edema: none  Toenails: All of the toenails were wnjncuexkRMl6sbkoshictqtxg  Hyperkeratosis: present on both first toes  Shoe Gear Evaluation: performed ()  Recommendation(s): custom inlays       Foot Exam     General  General Appearance: appears stated age and healthy   Orientation: alert and oriented to person, place, and time   Affect: appropriate         Right Foot/Ankle      Neurovascular  Dorsalis pedis: 3+  Posterior tibial: 3+     Tests  Heel raise: pain      Comments  Positive Frederick sign 3rd intermetatarsal space noted bilateral         X-ray demonstrates no evidence of fracture or bony mass     Left Foot/Ankle       Neurovascular  Dorsalis pedis: 3+  Posterior tibial: 3+     Tests  Heel raise: pain      Physical Exam   Cardiovascular:   Pulses:       Dorsalis pedis pulses are 3+ on the right side, and 3+ on the left side          Posterior tibial pulses are 3+ on the right side, and 3+ on the left side

## 2018-03-02 ENCOUNTER — TELEPHONE (OUTPATIENT)
Dept: FAMILY MEDICINE CLINIC | Facility: CLINIC | Age: 63
End: 2018-03-02

## 2018-03-02 ENCOUNTER — OFFICE VISIT (OUTPATIENT)
Dept: PODIATRY | Facility: CLINIC | Age: 63
End: 2018-03-02
Payer: COMMERCIAL

## 2018-03-02 VITALS
DIASTOLIC BLOOD PRESSURE: 94 MMHG | WEIGHT: 155 LBS | SYSTOLIC BLOOD PRESSURE: 146 MMHG | HEIGHT: 65 IN | BODY MASS INDEX: 25.83 KG/M2

## 2018-03-02 DIAGNOSIS — G57.61 LESION OF RIGHT PLANTAR NERVE: ICD-10-CM

## 2018-03-02 DIAGNOSIS — M54.16 RADICULOPATHY OF LUMBAR REGION: ICD-10-CM

## 2018-03-02 DIAGNOSIS — R26.2 DIFFICULTY IN WALKING INVOLVING ANKLE AND FOOT JOINT: ICD-10-CM

## 2018-03-02 DIAGNOSIS — G57.62 MORTON'S NEUROMA OF THIRD INTERSPACE OF LEFT FOOT: ICD-10-CM

## 2018-03-02 DIAGNOSIS — R26.2 DIFFICULTY WALKING: Primary | ICD-10-CM

## 2018-03-02 PROCEDURE — 99213 OFFICE O/P EST LOW 20 MIN: CPT | Performed by: PODIATRIST

## 2018-03-02 NOTE — TELEPHONE ENCOUNTER
Spoke to pt  States expirencing  Burning sensation in right foot  received injection in foot on Wednesday 2/28/18   Advised pt  To call Dr Serina Ceballos office  and explain current symptoms     Pt agreed and stated she would do as such  af/rma

## 2018-03-02 NOTE — PROGRESS NOTES
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    Difficulty in walking involving ankle and foot joint          Subjective:      Patient ID: Duane Valencia is a 65 y o  female  Patient had injection in right 3rd interspace 2 days ago  For 24 hour she had no pain or inflammation but starting last night she had severe pain and burning in the plantar foot  When not specifically in the area of neuroma just in the entire plantar aspect of the foot  Is having some burning and shooting pain  He also has history of back pain  Has not yet started taking gabapentin  Has been taking several ibuprofen a day  Says the occasionally hurt her stomach but usually do not  Patient has yet gone for blood work are made appointment for rheumatologist   Pain was 8/10 last night but pain is down to 3/10 today  The following portions of the patient's history were reviewed and updated as appropriate: allergies, current medications, past family history, past medical history, past social history, past surgical history and problem list     Review of Systems      Objective: Foot ExamPhysical Exam    Objective:  Review of Systems     Constitutional: feeling qsxwcjZTt8ndctmnk tired     Musculoskeletal: as noted in HPI       Active Problems  1  Acquired ankle/foot deformity (736 70) (M21 969)   2  Acute maxillary sinusitis, recurrence not specified (461 0) (J01 00)   3  Alcohol dependence in remission (303 93) (F10 21)   4  Allergic rhinitis (477 9) (J30 9)   5  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)   6  BMI 26 0-26 9,adult (V85 22) (Z68 26)   7  Depression (311) (F32 9)   8  Difficulty walking (719 7) (R26 2)   9  Elevated glucose (790 29) (R73 09)   10  Encounter for colorectal cancer screening (V76 51) (Z12 11,Z12 12)   11  Encounter for pre-operative examination (V72 84) (Z01 818)   12  Encounter for screening mammogram for breast cancer (V76 12) (Z12 31)   13  Esophagitis (530 10) (K20 9)   14  Ganglion of joint (727 41) (M67 40)   15  Generalized abdominal pain (789 07) (R10 84)   16  Hearing loss (389 9) (H91 90)   17  Irritable bowel syndrome (564 1) (K58 9)   18  Malignant melanoma of skin (172 9) (C43 9)   19  Ocular migraine (346 80) (G43 109)   20  Osteopenia (733 90) (M85 80)   21  Pes planus, congenital (754 61) (Q66 50)   22  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)   23  Raynaud's syndrome without gangrene (443 0) (I73 00)   24  Urticaria (708 9) (L50 9)     Past Medical History   · History of Lazy eye (368 00) (H53 009)     The active problems and past medical history were reviewed and updated today       Surgical History     The surgical history was reviewed and updated today        Family History  Mother    · Family history of arthritis (V17 7) (Z82 61)   · Family history of Stroke  Father    · Family history of Hypertension     The family history was reviewed and updated today        Social History   · Does not drink alcohol (V49 89) (Z78 9)   · Never smoked  The social history was reviewed and updated today   The social history was reviewed and is unchanged       Current Meds   1  Calcium + D TABS; 2 daily;   Therapy: (Adriana Fernández) to Recorded   2  Levocetirizine Dihydrochloride 5 MG Oral Tablet; take 1 tablet every day;   Therapy: 15RZC3069 to (Evaluate:95Qbc6535)  Requested for: 09RIG9265; Last   Rx:19Jun2017 Ordered   3  Oxycodone-Acetaminophen  MG Oral Tablet; TAKE 1 TABLET 4 TIMES DAILY AS   NEEDED FOR PAIN;  Lois Rosa: 89JLS8909 to (Evaluate:28May2017); Last Rx:18May2017 Ordered   4  PredniSONE 10 MG Oral Tablet; Take 1 tablet daily;   Therapy: 17LDX2085 to (Evaluate:24Jun2017)  Requested for: 96XOK6828; Last   Rx:19Jun2017 Ordered   5  RaNITidine HCl - 150 MG Oral Tablet; TAKE 1 TABLET EVERY 12 HOURS DAILY;   Therapy: 99XNW4333 to (Evaluate:67Tbb1515)  Requested for: 96SWJ6379; Last   Rx:19Jun2017 Ordered     The medication list was reviewed and updated today       Allergies  1  DEMEROL   2  Sulfa Drugs     Vitals    Recorded: 61ADC2044 01:13PM   Heart Rate 75   Respiration 16   Systolic 331   Diastolic 86   Height 5 ft 4 in   Weight 156 lb    BMI Calculated 26 78   BSA Calculated 1 76      Physical Exam  Left Foot: Appearance: Normal except as noted: excessive ivdanyhenTBd1wgj planus  Great toe deformities include a bunion  Right Foot: Appearance: Normal except as noted: excessive lzwolabehRQz9xhh planus  Great toe deformities include a bunion  Left Ankle: ROM: Full  Motor: Normal    Right Ankle: ROM: Full  Motor: Normal    Neurological Exam: performed  Light touch was intact bilaterally  Vibratory sensation was intact bilaterally  Response to monofilament test was intact bilaterally  Deep tendon reflexes: patellar reflex present foxbttjdboeXRi7wefxrhip reflex present bilaterally  Vascular Exam: performed Dorsalis pedis pulses were present bilaterally  Posterior tibial pulses were present bilaterally  Elevation Pallor: absent bilaterally  Dependence rubor was absent bilaterally  Edema: none  Toenails: All of the toenails were tgaihmnqdTDw6xxgukqogukbse  Hyperkeratosis: present on both first toes  Shoe Gear Evaluation: performed ()   Recommendation(s): custom inlays       Foot Exam     General  General Appearance: appears stated age and healthy   Orientation: alert and oriented to person, place, and time   Affect: appropriate         Right Foot/Ankle      Neurovascular  Dorsalis pedis: 3+  Posterior tibial: 3+     Tests  Heel raise: pain      Comments  Positive Frederick sign 3rd intermetatarsal space noted bilateral         X-ray demonstrates no evidence of fracture or bony mass     Left Foot/Ankle       Neurovascular  Dorsalis pedis: 3+  Posterior tibial: 3+     Tests  Heel raise: pain      Physical Exam   Cardiovascular:   Pulses:       Dorsalis pedis pulses are 3+ on the right side, and 3+ on the left side  Posterior tibial pulses are 3+ on the right side, and 3+ on the left sideRight plantar foot and the pain is not in the area of the 3rd metatarsal there is some pain in the 3rd interspace on palpation but no redness no swelling  No sign of infection no break in skin  There is pain on dorsiflexion of the foot as well as pain along the flexor tendons  The pain is generalized to the entire bottom of but not well localized there is no swelling     Muscle strength 5/5 all groups bilateral feet and ankles

## 2018-03-03 ENCOUNTER — APPOINTMENT (EMERGENCY)
Dept: RADIOLOGY | Facility: HOSPITAL | Age: 63
DRG: 880 | End: 2018-03-03
Payer: COMMERCIAL

## 2018-03-03 ENCOUNTER — HOSPITAL ENCOUNTER (INPATIENT)
Facility: HOSPITAL | Age: 63
LOS: 4 days | Discharge: NURSING, REHAB OR LONG-TERM CARE | DRG: 880 | End: 2018-03-07
Attending: EMERGENCY MEDICINE | Admitting: ANESTHESIOLOGY
Payer: COMMERCIAL

## 2018-03-03 ENCOUNTER — APPOINTMENT (INPATIENT)
Dept: RADIOLOGY | Facility: HOSPITAL | Age: 63
DRG: 880 | End: 2018-03-03
Payer: COMMERCIAL

## 2018-03-03 DIAGNOSIS — K58.9 IRRITABLE BOWEL SYNDROME: Chronic | ICD-10-CM

## 2018-03-03 DIAGNOSIS — R53.1 LEFT-SIDED WEAKNESS: Primary | ICD-10-CM

## 2018-03-03 PROBLEM — M54.16 RADICULOPATHY OF LUMBAR REGION: Chronic | Status: ACTIVE | Noted: 2018-02-28

## 2018-03-03 PROBLEM — I63.9 ACUTE ISCHEMIC STROKE (HCC): Status: ACTIVE | Noted: 2018-03-03

## 2018-03-03 PROBLEM — M85.80 OSTEOPENIA: Chronic | Status: ACTIVE | Noted: 2017-03-27

## 2018-03-03 LAB
ALBUMIN SERPL BCP-MCNC: 4.2 G/DL (ref 3.5–5)
ALP SERPL-CCNC: 67 U/L (ref 46–116)
ALT SERPL W P-5'-P-CCNC: 37 U/L (ref 12–78)
ANION GAP SERPL CALCULATED.3IONS-SCNC: 9 MMOL/L (ref 4–13)
APTT PPP: 25 SECONDS (ref 24–33)
AST SERPL W P-5'-P-CCNC: 25 U/L (ref 5–45)
ATRIAL RATE: 95 BPM
BASOPHILS # BLD AUTO: 0 THOUSANDS/ΜL (ref 0–0.1)
BASOPHILS NFR BLD AUTO: 0 % (ref 0–1)
BILIRUB SERPL-MCNC: 0.6 MG/DL (ref 0.2–1)
BILIRUB UR QL STRIP: ABNORMAL
BUN SERPL-MCNC: 18 MG/DL (ref 5–25)
CALCIUM SERPL-MCNC: 9.4 MG/DL (ref 8.3–10.1)
CHLORIDE SERPL-SCNC: 102 MMOL/L (ref 100–108)
CLARITY UR: CLEAR
CO2 SERPL-SCNC: 26 MMOL/L (ref 21–32)
COLOR UR: ABNORMAL
CREAT SERPL-MCNC: 0.98 MG/DL (ref 0.6–1.3)
EOSINOPHIL # BLD AUTO: 0.1 THOUSAND/ΜL (ref 0–0.61)
EOSINOPHIL NFR BLD AUTO: 1 % (ref 0–6)
ERYTHROCYTE [DISTWIDTH] IN BLOOD BY AUTOMATED COUNT: 14.4 % (ref 11.6–15.1)
GFR SERPL CREATININE-BSD FRML MDRD: 62 ML/MIN/1.73SQ M
GLUCOSE SERPL-MCNC: 125 MG/DL (ref 65–140)
GLUCOSE SERPL-MCNC: 146 MG/DL (ref 65–140)
GLUCOSE UR STRIP-MCNC: NEGATIVE MG/DL
HCT VFR BLD AUTO: 44.8 % (ref 37–47)
HGB BLD-MCNC: 14.8 G/DL (ref 12–16)
HGB UR QL STRIP.AUTO: NEGATIVE
INR PPP: 0.98 (ref 0.86–1.16)
KETONES UR STRIP-MCNC: NEGATIVE MG/DL
LEUKOCYTE ESTERASE UR QL STRIP: NEGATIVE
LYMPHOCYTES # BLD AUTO: 1.7 THOUSANDS/ΜL (ref 0.6–4.47)
LYMPHOCYTES NFR BLD AUTO: 21 % (ref 14–44)
MAGNESIUM SERPL-MCNC: 1.9 MG/DL (ref 1.6–2.6)
MCH RBC QN AUTO: 28.3 PG (ref 27–31)
MCHC RBC AUTO-ENTMCNC: 33.1 G/DL (ref 31.4–37.4)
MCV RBC AUTO: 86 FL (ref 82–98)
MONOCYTES # BLD AUTO: 0.6 THOUSAND/ΜL (ref 0.17–1.22)
MONOCYTES NFR BLD AUTO: 8 % (ref 4–12)
NEUTROPHILS # BLD AUTO: 5.7 THOUSANDS/ΜL (ref 1.85–7.62)
NEUTS SEG NFR BLD AUTO: 69 % (ref 43–75)
NITRITE UR QL STRIP: NEGATIVE
NRBC BLD AUTO-RTO: 0 /100 WBCS
P AXIS: 56 DEGREES
PH UR STRIP.AUTO: 7 [PH] (ref 5–9)
PLATELET # BLD AUTO: 255 THOUSANDS/UL (ref 130–400)
PMV BLD AUTO: 8.6 FL (ref 8.9–12.7)
POTASSIUM SERPL-SCNC: 4.4 MMOL/L (ref 3.5–5.3)
PR INTERVAL: 156 MS
PROT SERPL-MCNC: 7.7 G/DL (ref 6.4–8.2)
PROT UR STRIP-MCNC: NEGATIVE MG/DL
PROTHROMBIN TIME: 10.3 SECONDS (ref 9.4–11.7)
QRS AXIS: -17 DEGREES
QRSD INTERVAL: 80 MS
QT INTERVAL: 348 MS
QTC INTERVAL: 437 MS
RBC # BLD AUTO: 5.23 MILLION/UL (ref 4.2–5.4)
SODIUM SERPL-SCNC: 137 MMOL/L (ref 136–145)
SP GR UR STRIP.AUTO: 1.01 (ref 1–1.03)
T WAVE AXIS: 22 DEGREES
TROPONIN I SERPL-MCNC: <0.02 NG/ML
UROBILINOGEN UR QL STRIP.AUTO: 0.2 E.U./DL
VENTRICULAR RATE: 95 BPM
WBC # BLD AUTO: 8.2 THOUSAND/UL (ref 4.8–10.8)

## 2018-03-03 PROCEDURE — 82948 REAGENT STRIP/BLOOD GLUCOSE: CPT

## 2018-03-03 PROCEDURE — 85610 PROTHROMBIN TIME: CPT | Performed by: EMERGENCY MEDICINE

## 2018-03-03 PROCEDURE — 71045 X-RAY EXAM CHEST 1 VIEW: CPT

## 2018-03-03 PROCEDURE — 3E03317 INTRODUCTION OF OTHER THROMBOLYTIC INTO PERIPHERAL VEIN, PERCUTANEOUS APPROACH: ICD-10-PCS | Performed by: STUDENT IN AN ORGANIZED HEALTH CARE EDUCATION/TRAINING PROGRAM

## 2018-03-03 PROCEDURE — 81003 URINALYSIS AUTO W/O SCOPE: CPT | Performed by: EMERGENCY MEDICINE

## 2018-03-03 PROCEDURE — 99291 CRITICAL CARE FIRST HOUR: CPT | Performed by: ANESTHESIOLOGY

## 2018-03-03 PROCEDURE — 99292 CRITICAL CARE ADDL 30 MIN: CPT

## 2018-03-03 PROCEDURE — 83735 ASSAY OF MAGNESIUM: CPT | Performed by: EMERGENCY MEDICINE

## 2018-03-03 PROCEDURE — 80053 COMPREHEN METABOLIC PANEL: CPT | Performed by: EMERGENCY MEDICINE

## 2018-03-03 PROCEDURE — 85730 THROMBOPLASTIN TIME PARTIAL: CPT | Performed by: EMERGENCY MEDICINE

## 2018-03-03 PROCEDURE — 85025 COMPLETE CBC W/AUTO DIFF WBC: CPT | Performed by: EMERGENCY MEDICINE

## 2018-03-03 PROCEDURE — 87081 CULTURE SCREEN ONLY: CPT | Performed by: ANESTHESIOLOGY

## 2018-03-03 PROCEDURE — 70450 CT HEAD/BRAIN W/O DYE: CPT

## 2018-03-03 PROCEDURE — 96374 THER/PROPH/DIAG INJ IV PUSH: CPT

## 2018-03-03 PROCEDURE — 93010 ELECTROCARDIOGRAM REPORT: CPT | Performed by: INTERNAL MEDICINE

## 2018-03-03 PROCEDURE — 99254 IP/OBS CNSLTJ NEW/EST MOD 60: CPT | Performed by: PSYCHIATRY & NEUROLOGY

## 2018-03-03 PROCEDURE — 99291 CRITICAL CARE FIRST HOUR: CPT

## 2018-03-03 PROCEDURE — 36415 COLL VENOUS BLD VENIPUNCTURE: CPT | Performed by: EMERGENCY MEDICINE

## 2018-03-03 PROCEDURE — 84484 ASSAY OF TROPONIN QUANT: CPT | Performed by: EMERGENCY MEDICINE

## 2018-03-03 PROCEDURE — 93005 ELECTROCARDIOGRAM TRACING: CPT | Performed by: EMERGENCY MEDICINE

## 2018-03-03 RX ORDER — ATORVASTATIN CALCIUM 40 MG/1
40 TABLET, FILM COATED ORAL EVERY EVENING
Status: DISCONTINUED | OUTPATIENT
Start: 2018-03-03 | End: 2018-03-07 | Stop reason: HOSPADM

## 2018-03-03 RX ORDER — POLYETHYLENE GLYCOL 3350 17 G/17G
17 POWDER, FOR SOLUTION ORAL DAILY
Status: DISCONTINUED | OUTPATIENT
Start: 2018-03-03 | End: 2018-03-07 | Stop reason: HOSPADM

## 2018-03-03 RX ORDER — ACETAMINOPHEN 325 MG/1
650 TABLET ORAL EVERY 6 HOURS PRN
Status: DISCONTINUED | OUTPATIENT
Start: 2018-03-03 | End: 2018-03-07 | Stop reason: HOSPADM

## 2018-03-03 RX ORDER — SODIUM CHLORIDE, SODIUM LACTATE, POTASSIUM CHLORIDE, CALCIUM CHLORIDE 600; 310; 30; 20 MG/100ML; MG/100ML; MG/100ML; MG/100ML
75 INJECTION, SOLUTION INTRAVENOUS CONTINUOUS
Status: DISCONTINUED | OUTPATIENT
Start: 2018-03-03 | End: 2018-03-04

## 2018-03-03 RX ADMIN — POLYETHYLENE GLYCOL 3350 17 G: 17 POWDER, FOR SOLUTION ORAL at 16:19

## 2018-03-03 RX ADMIN — ATORVASTATIN CALCIUM 40 MG: 40 TABLET, FILM COATED ORAL at 17:57

## 2018-03-03 RX ADMIN — ALTEPLASE 56.9 MG: KIT at 10:50

## 2018-03-03 RX ADMIN — SODIUM CHLORIDE, SODIUM LACTATE, POTASSIUM CHLORIDE, AND CALCIUM CHLORIDE 75 ML/HR: .6; .31; .03; .02 INJECTION, SOLUTION INTRAVENOUS at 14:30

## 2018-03-03 NOTE — CONSULTS
[facility]   Neurology Initial Consult    Maya Roth is a 65 y o  female  ICU 10/ICU 10          Information obtained from:   Chief Complaint   Patient presents with    Facial Numbness     Pt sts started this am with left sided facial numbness and feels weak on left leg  Pt tearful, sts has had left leg pain and foot pain  Also c/o bilateral hand tingling and sweating         Assessment/Plan:    1  Left sided weakness, s/p IV tPA at 10:49 on 3/3/18  2  Probable right lacunar stroke vs non neurological symptoms  3  HTN  4  H/o anxiety disorder    This was a difficult case  She has a lot of anxiety history going against treating but at the same time, she has left leg significant motor deficit with left facial asymmetry and numbness where stroke is definitely not excluded  We offered IV tPA and discussed risks vs benefits  We proceeded with treatment  We could not meet door to needle 60 min time frame because patient had no IV access  3 separate IV placed were all blown  Could only give bolus after first successful IV access after ultrasound guide at 10:49am  Patient first reported 2/10 headache but we monitored, later she reported blurred vision so tPA infusion was stopped and she was sent to get repeat CT brain along with CTA that was pending  It was negative for hemorrhage so we completed remaining infusion  Admit to ICU for 24 hrs  Permissive hypertension  No antiplatelet, anticoagulation for next 24 hrs  MRI brain w/wo contrast  TTE w/ shunt  CTA still pending   If can't, will get carotid usg    lipitor 40mg  Speech and swallow evaluation  Neuro checks q4h  PT/OT  Call with questions          TPA Decision: TPA given this admission      Reason for Consult / Principal Problem: stroke   Hx and PE limited by: patient's anxious nature   Patient last known well: 8:30am  Stroke alert called: 9:45am  Neurology time of arrival: 10:23am, discussed case over phone at 9:47am        HPI:  Maya Roth is a 66 yo F with PMH of anxiety disorder, raynaud's disease presented with left sided numbness and weakness  She woke up at 6 am and was not feeling right in sense that she felt her heart was racing  She initially came with chest pain  She says that after 8:30am, she started feeling numbness in left side of face and had difficulty moving left side  During initial encounter, her strength in LLE was inconsistent  At first she could move it against gravity and then she wasn't able to  She vaguely remembers similar episode about 10 years ago but is not sure of outcome at the time  She is not on any antiplatelet therapy at home  Upon arrival, her bp was 190/100  Because she is within IV tPA window and has clear deficits, discussed risks and benefits of IV tPA          Past Medical History:   Diagnosis Date    Anesthesia complication     difficult to wake up    GERD (gastroesophageal reflux disease)     Osteopenia     with joint pain-elevated DEV    Tinnitus     Wears glasses     for reading       Past Surgical History:   Procedure Laterality Date    ABDOMINAL SURGERY      lysis of adhesions x 2    APPENDECTOMY      CHOLECYSTECTOMY      open    DILATION AND CURETTAGE OF UTERUS      NEUROMA EXCISION Right 5/26/2017    Procedure: EXCISION MASS / FIBROMA FOOT;  Surgeon: Vitaliy Whittington DPM;  Location: [address] [place] [address];  Service:     RIGHT OOPHORECTOMY      WISDOM TOOTH EXTRACTION      x4       Allergies   Allergen Reactions    Demerol [Meperidine] Lightheadedness     Reaction Date: 98HQH7090;     Sulfa Antibiotics Hives     Reaction Date: [id number]; No current facility-administered medications for this encounter  Social History     Social History    Marital status: /Civil Union     Spouse name: N/A    Number of children: N/A    Years of education: N/A     Occupational History    Not on file       Social History Main Topics    Smoking status: Never Smoker    Smokeless tobacco: Never Used   Cayla Escobedo Alcohol use No    Drug use: No    Sexual activity: Not on file     Other Topics Concern    Not on file     Social History Narrative    No narrative on file       Family History   Problem Relation Age of Onset    Heart disease Mother     Stroke Mother 58    COPD Mother     Hypertension Father     Kidney disease Brother      kidney transplant         Review of systems:  Please see HPI for positive symptoms  No fever, no chills, no weight change  Ocular: No drainage, no blurred vision  HEENT:  No sore throat, earache, or congestion  No neck pain  COR:  No chest pain  No palpitations  Lungs:  no sob, wheezing,  GI:  no  nausea, no vomiting, no diarrhea, no constipation, no anorexia  :  No dysuria, frequency, or urgency  No hematuria  Musculoskeletal:  No joint pain or swelling or edema  Skin:  No rash or itching  Psychiatric:  +anxiety, no depression  Endocrine:  No polyuria or polydipsia  Physical examination:  Vitals:    18 1328   BP: 131/72   Pulse: 85   Resp: 16   Temp: 98 8 °F (37 1 °C)   SpO2: 97%     NIHSS:    1a Level of Consciousness: 0 = Alert   1b  LOC Questions: 0 = Answers both correctly   1c  LOC Commands: 0 = Obeys both correctly   2  Best Gaze: 0 = Normal   3  Visual: 0 = No visual field loss   4  Facial Palsy: 1=Minor paralysis (flattened nasolabial fold, asymmetric on smiling)   5a  Motor Right Arm: 0=No drift, limb holds 90 (or 45) degrees for full 10 seconds   5b  Motor Left Arm: 1=Drift, limb holds 90 (or 45) degrees but drifts down before full 10 seconds: does not hit bed   6a  Motor Right Le=No drift, limb holds 90 (or 45) degrees for full 10 seconds   6b  Motor Left Le=Some effort against gravity, limb cannot get to or maintain (if cured) 90 (or 45) degrees, drifts down to bed, but has some effort against gravity   7  Limb Ataxia:  0=Absent   8   Sensory: 1=Mild to moderate sensory loss; patient feels pinprick is less sharp or is dull on the affected side; there is a loss of superficial pain with pinprick but patient is aware She is being touched   9  Best Language:  0=No aphasia, normal   10  Dysarthria: 0=Normal articulation   11  Extinction and Inattention (formerly Neglect): 0=No abnormality   Total Score: 5           GENERAL APPEARANCE:  The patient is alert, oriented  He is in no acute distress  HEENT:  Head is normocephalic  The sinuses are otherwise nontender  Pupils are equal and reactive  NECK:  Supple without lymphadenopathy  HEART:  Regular rate and rhythm  LUNGS:  clear to auscultation  No crackles or wheezes are heard  ABDOMEN:  Soft, nontender, nondistended with good bowel sounds heard  EXTREMITIES:  Without cyanosis, clubbing or edema  Mental status: The patient is alert, attentive, and oriented  Speech is clear and fluent, good repetition, comprehension, and naming  Niesha Fabry for memory  Cranial nerves:  CN II: Visual fields are full to confrontation  Fundoscopic exam is normal with sharp discs and no vascular changes    Pupils are 3 mm and briskly reactive to light  CN III, IV, VI: At primary gaze, there is no eye deviation  CN V: decreased left  facial sensation  Corneal responses are intact  CN VII: left facial asymmetry  CN VIII: Hearing is normal to rubbing fingers  CN IX, X: Palate elevates symmetrically  Phonation is normal   CN XI: Head turning and shoulder shrug are intact  CN XII: Tongue is midline with normal movements and no atrophy  Motor: There is no pronator drift of out-stretched in LUE  Muscle bulk is normal, tone is decreased in LLE        Muscle exam  Arm Right Left Leg Right Left   Deltoid 5/5 4+/5 Iliopsoas 5/5 2/5   Biceps 5/5 4+/5 Quads 5/5 2/5   Triceps 5/5 4+5 Hamstrings 5/5 2/5   Wrist Extension 5/5 4+/5 Ankle Dorsi Flexion 5/5 3/5   Wrist Flexion 5/5 4+/5 Ankle Plantar Flexion 5/5 3/5   Interossei 5/5 4+/5 Ankle Eversion 5/5 3/5   APB 5/5 4+/5 Ankle Inversion 5/5 3/5       Reflexes   RJ BJ TJ KJ AJ Plantars Cantrell's   Right 2+ 2+ 2+ 2+ 2+ Downgoing Not present   Left 2+ 2+ 2+ 2+ 2+ equivocal  Not present   Left foot: nonsustained clonus *    Sensory:  Feels numb on right leg and some hyperesthesia in left leg  Coordination:  Rapid alternating movements and fine finger movements are intact  There is no dysmetria on finger-to-nose and heel-knee-shin  There are no abnormal or extraneous movements  Romberg leonela  Gait/Stance:  Dicie Memory, patient can't lift her left leg     Lab Results   Component Value Date    WBC 8 20 2018    HGB 14 8 2018    HCT 44 8 2018    MCV 86 2018     2018     No results found for: HGBA1C  Lab Results   Component Value Date    ALT 37 2018    AST 25 2018    ALKPHOS 67 2018    BILITOT 0 60 2018     Lab Results   Component Value Date    GLUCOSE 146 (H) 2018    CALCIUM 9 4 2018     2018    K 4 4 2018    CO2 26 2018     2018    BUN 18 2018    CREATININE 0 98 2018         Radiology          Review of reports and Independent Interpretation of images or specimens:  Ct Head Without Contrast    Result Date: 3/3/2018  No acute intracranial abnormality  Workstation performed: [id number]     Xr Stroke Alert Portable Chest    Result Date: 3/3/2018  No acute cardiopulmonary disease  Workstation performed: [id number]     Ct Stroke Alert Brain    Result Date: 3/3/2018  No intracranial hemorrhage or acute large vessel territorial infarct detected  Findings were directly discussed with MARCUS PLUMMER on 3/3/2018 9:53 AM  Workstation performed: [id number]               Thank you for this consult  Total time of encounter: 80 min   More than 50% of time was spent in counseling and coordination of care of patient  FATEMEH Boss  [facility]  [address] [place] [address],[address]  [place] [address]

## 2018-03-03 NOTE — STROKE DOCUMENTATION
Call to [facility] in ICU, will be unable to take patient for awhile and will call when ready, charge nurse Sloan Osuna is aware  Patient is talking to  on speaker phone  TPA near completion    VSS

## 2018-03-03 NOTE — ED NOTES
Pt c/o a slight headache after the initial bolus   Dr Keri Kohler aware      Valeriy Crowder, RN  03/03/18 [id number]

## 2018-03-03 NOTE — ASSESSMENT & PLAN NOTE
TPA given in the ED yesterday, neuro checks stable; exam has been inconsistent; still with reported weakness on the left that is not always consistent as well; Neuro Dr Malinda Valencia following; repeat [facility] in 24 hours @ 1200 today; PT/OT evals pending; Speech eval pending; passed bedside swallow with nursing, Lipitor started Lipid panel pending

## 2018-03-03 NOTE — ED NOTES
Patient to ICU with RN and monitor  Patient has not voided, blurry vision has been gone but still limited field of vision right eye  States her 3/10 back pain she feels is from stretcher  Patient has had lower abdominal " odd feeling"  since report received and was evaluated by Dr Ray Calvert at same time that her blurred vision and prior to CT of head  States still has odd feeling abdomen  Patient states left side of her body still feels the same with her left arm heavy   Donnie Shipman RN  03/03/18 [id number]

## 2018-03-03 NOTE — H&P
History and Physical - Critical Care/ Stepdown   Thiago Hawley 65 y o  female MRN: [id number]  Unit/Bed#: ED 09 Encounter: [id number]    Reason for Admission / Chief Complaint: s/p TPA administration for possible CVA    History of Present Illness:  Thiago Hawley is a 65 y o  female who presents to the ED w/ c/o left facial numbness and left leg weakness, also c/o B/L hand tingling and sweating that started at 0830 this morning  She was taken to CT scan and not found to have any bleeding, so TPA was initiated after ED discussion Dr Oliverio López of neurology  During the TPA infusion pt began c/o blurred vision in the right eye which progressed to a right visual field deficit  Emergent CT head was done and found to have no bleeding  Pt still c/o left weakness and right visual filed loss  Blurred vision is better  History obtained from chart review and the patient      Past Medical History:  Past Medical History:   Diagnosis Date    Anesthesia complication     difficult to wake up    GERD (gastroesophageal reflux disease)     Osteopenia     with joint pain-elevated DEV    Tinnitus     Wears glasses     for reading       Past Surgical History:  Past Surgical History:   Procedure Laterality Date    ABDOMINAL SURGERY      lysis of adhesions x 2    APPENDECTOMY      CHOLECYSTECTOMY      open    DILATION AND CURETTAGE OF UTERUS      NEUROMA EXCISION Right 5/26/2017    Procedure: EXCISION MASS / FIBROMA FOOT;  Surgeon: Blanca Yan DPM;  Location: [facility];  Service:     RIGHT OOPHORECTOMY      WISDOM TOOTH EXTRACTION      x4       Past Family History:  Family History   Problem Relation Age of Onset    Heart disease Mother     Stroke Mother 58    COPD Mother     Hypertension Father     Kidney disease Brother      kidney transplant       Social History:  History   Smoking Status    Never Smoker   Smokeless Tobacco    Never Used      History   Alcohol Use No     History   Drug Use No     Marital Status: /Civil Union  Exercise History: Ambulatory    Medications:  No current facility-administered medications for this encounter  Home medications:  Prior to Admission medications    Medication Sig Start Date End Date Taking? Authorizing Provider   calcium citrate-vitamin D (CITRACAL+D) 315-200 MG-UNIT per tablet Take by mouth daily    Historical Provider, MD   EPINEPHrine (EPIPEN) 0 3 mg/0 3 mL SOAJ USE AS DIRECTED IN CASE OF SEVERE ALLERGIC REACTION- SHORTNESS OF   (REFER TO PRESCRIPTION NOTES)  11/15/17   Historical Provider, MD   etodolac (LODINE) 400 MG tablet Take 1 tablet (400 mg total) by mouth 2 (two) times a day for 30 days 2/28/18 3/30/18  Zhang Ayala DPM   gabapentin (NEURONTIN) 100 mg capsule Take 1 capsule (100 mg total) by mouth 3 (three) times a day for 30 days 2/28/18 3/30/18  Zhang Ayala DPM   meloxicam (MOBIC) 15 mg tablet Take 15 mg by mouth daily    Historical Provider, MD   omeprazole (PriLOSEC) 20 mg delayed release capsule Take 40 mg by mouth as needed      Historical Provider, MD   piroxicam (FELDENE) 20 mg capsule Take 1 capsule (20 mg total) by mouth daily 3/2/18   Ross Looney DPM   meloxicam (MOBIC) 7 5 mg tablet Take 1 tablet (7 5 mg total) by mouth daily for 10 days 2/15/18 3/3/18  Zhang Ayala DPM   omeprazole (PriLOSEC) 40 MG capsule  1/31/18 3/3/18  Historical Provider, MD     Allergies: Allergies   Allergen Reactions    Demerol [Meperidine] Lightheadedness     Reaction Date: 11Jan2006;     Sulfa Antibiotics Hives     Reaction Date: 11Jan2006;        ROS:   Review of Systems   HENT: Negative  Eyes: Positive for visual disturbance  Respiratory: Negative  Cardiovascular: Negative  Gastrointestinal: Negative  Genitourinary: Negative  Musculoskeletal: Negative  Skin: Negative  Neurological: Positive for weakness, numbness and headaches  Psychiatric/Behavioral: Negative  All other systems reviewed and are negative        Vitals:  Vitals: 18 1226 18 1229 18 1235 18 1240   BP: 136/73      Pulse:  86 88 88   Resp:  19 17 16   Temp:       SpO2:  96% 95% 96%   Weight:         Temperature:   Temp (24hrs), Av °F (36 7 °C), Min:98 °F (36 7 °C), Max:98 °F (36 7 °C)    Current Temperature: 98 °F (36 7 °C)    Weights:   IBW: -92 5 kg  Body mass index is 25 79 kg/m²  Hemodynamic Monitoring:  N/A     Non-Invasive/Invasive Ventilation Settings:  Respiratory    Lab Data (Last 4 hours)    None         O2/Vent Data (Last 4 hours)    None              No results found for: PHART, PKK6VPU, PO2ART, WEQ4SZO, E2KGKXBX, BEART, SOURCE  SpO2: SpO2: 96 %, SpO2 Device: O2 Device: None (Room air)     Physical Exam:  Physical Exam   Constitutional: She is oriented to person, place, and time  She appears well-developed and well-nourished  No distress  HENT:   Head: Normocephalic  Eyes: Pupils are equal, round, and reactive to light  Neck: Normal range of motion  Cardiovascular: Normal rate and regular rhythm  Pulmonary/Chest: Effort normal and breath sounds normal  No respiratory distress  Abdominal: Soft  Bowel sounds are normal  She exhibits no distension  There is no tenderness  Neurological: She is alert and oriented to person, place, and time  Unable to lift left leg against gravity, left arm with movement but weakness against gravity; right side 5/5, equal strength; right peripheral vision loss   Skin: Skin is warm and dry  Capillary refill takes less than 2 seconds  She is not diaphoretic  Psychiatric: She has a normal mood and affect  Her behavior is normal    Nursing note and vitals reviewed        Labs:    Results from last 7 days  Lab Units 18  0952   WBC Thousand/uL 8 20   HEMOGLOBIN g/dL 14 8   HEMATOCRIT % 44 8   PLATELETS Thousands/uL 255   NEUTROS PCT % 69   MONOS PCT % 8      Results from last 7 days  Lab Units 18  0952   SODIUM mmol/L 137   POTASSIUM mmol/L 4 4   CHLORIDE mmol/L 102   CO2 mmol/L 26 BUN mg/dL 18   CREATININE mg/dL 0 98   CALCIUM mg/dL 9 4   TOTAL PROTEIN g/dL 7 7   BILIRUBIN TOTAL mg/dL 0 60   ALK PHOS U/L 67   ALT U/L 37   AST U/L 25   GLUCOSE RANDOM mg/dL 146*       Results from last 7 days  Lab Units 03/03/18  0952   MAGNESIUM mg/dL 1 9            Results from last 7 days  Lab Units 03/03/18  0952   INR  0 98   PTT seconds 25           0  Lab Value Date/Time   TROPONINI <0 02 03/03/2018 5962       Imaging: CXR: NAD I have personally reviewed pertinent reports  and I have personally reviewed pertinent films in PACS  CT: Head negative for bleed I have personally reviewed pertinent reports  EKG: NSR This was personally reviewed by myself  Micro:  Blood Culture: No results found for: BLOODCX  Urine Culture: No results found for: URINECX  Sputum Culture: No components found for: SPUTUMCX  Wound Culure: No results found for: WOUNDCULT  ______________________________________________________________________    Assessment:   Active Problems:    Depression    Irritable bowel syndrome    Osteopenia    Radiculopathy of lumbar region    Acute ischemic stroke [facility])  Resolved Problems:    * No resolved hospital problems  *    Acute ischemic stroke [facility])   Assessment & Plan    TPA given in the ED, will need to follow neuro check per protocol; Monitor for any bleeding sources;  Neuro c/s placed, Dr Malinda Valencia already evaluated; repeat CTH in 24 hours        Radiculopathy of lumbar region   Assessment & Plan    Hold NSAIDs for now        Depression   Assessment & Plan    Restart home Neurontin              Plan:    Neuro:   · Frequent neuro checks, NIH stroke scale baseline    CV:   · Keep SBP<180    Pulm:   · Resp unlabored, O2 as needed    GI:   · NPO for now, needs bedside swallow eval    :  · Voiding on own    FEN:   · Labs stable will re-evaluate tomorrow    ID:   · No active infectious source    Heme:   · Hgb stable, monitor for any bleeding    Endo:   · BS stable, no hx DM    MSK/Skin:   · PT/OT evals placed    Family:  · Family updated: no     Disposition: Admit to ICU for frequent neuro checks     Counseling / Coordination of Care  Total Critical Care time spent 40 minutes excluding procedures, teaching and family updates  ______________________________________________________________________    VTE Pharmacologic Prophylaxis: Reason for no pharmacologic prophylaxis received TPA  VTE Mechanical Prophylaxis: sequential compression device    Invasive lines and devices: Invasive Devices     Peripheral Intravenous Line            Peripheral IV 03/03/18 less than 1 day    Peripheral IV 03/03/18 Left Antecubital less than 1 day    Peripheral IV 03/03/18 Right Antecubital less than 1 day                Code Status: No Order  POA:    POLST:      Given critical illness, patient length of stay will require greater than two midnights      Signature: SHRAVAN Robledo  Date: 3/3/2018

## 2018-03-03 NOTE — STROKE DOCUMENTATION
Pt c/o slight blurred vision in her right eye which resolved quickly   Headache is still present but only a 2/10   Pt c/o her stomach not feeling good     Pt c/o being tired   Dr Hyun Pires at bedside   Alteplase stopped per Dr Hyun Pires @ [id number]

## 2018-03-03 NOTE — Clinical Note
Case was discussed with Andi Caruso NP (with Dr Jeff Moser) and the patient's admission status was agreed to be MICU to the service of Dr Jeff Moser

## 2018-03-03 NOTE — STROKE DOCUMENTATION
Alteplase is completed  IV  Site with encircled hematoma remains same in size , saline locked  No change in neuro

## 2018-03-03 NOTE — ED PROVIDER NOTES
History  Chief Complaint   Patient presents with    Facial Numbness     Pt sts started this am with left sided facial numbness and feels weak on left leg  Pt tearful, sts has had left leg pain and foot pain  Also c/o bilateral hand tingling and sweating       History provided by:  Patient and spouse   used: No    CVA/TIA-like Symptoms   Presenting symptoms: confusion and sensory loss    Presenting symptoms: no change in level of consciousness, no headaches, no disturbances in coordination, no language symptoms, no loss of balance, no visual change and no weakness    Date/time of last known well:  3/3/2018 8:45 AM  Onset quality:  Gradual  Duration:  1 hour  Timing:  Constant  Progression:  Unchanged  Similar to previous episodes: yes (Years ago unable to specify timing and/or eval and/or management at this time)    Associated symptoms: paresthesias    Associated symptoms: no chest pain, no trouble swallowing, no dizziness, no facial pain, no fall, no fever, no hearing loss, no bladder incontinence, no nausea, no neck pain, no seizures, no tinnitus, no vertigo and no vomiting    Associated symptoms comment:  Left facial numbness,lower extremity weakness, feels confused; mix with chronic bilateral lower extremity pain, diffuse paresthesias      Prior to Admission Medications   Prescriptions Last Dose Informant Patient Reported? Taking? EPINEPHrine (EPIPEN) 0 3 mg/0 3 mL SOAJ More than a month at Unknown time  Yes No   Sig: USE AS DIRECTED IN CASE OF SEVERE ALLERGIC REACTION- SHORTNESS OF   (REFER TO PRESCRIPTION NOTES)     calcium citrate-vitamin D (CITRACAL+D) 315-200 MG-UNIT per tablet Past Week at Unknown time  Yes Yes   Sig: Take by mouth daily   etodolac (LODINE) 400 MG tablet 3/3/2018 at 0700  No Yes   Sig: Take 1 tablet (400 mg total) by mouth 2 (two) times a day for 30 days   gabapentin (NEURONTIN) 100 mg capsule 3/3/2018 at 0700  No Yes   Sig: Take 1 capsule (100 mg total) by mouth 3 (three) times a day for 30 days   meloxicam (MOBIC) 15 mg tablet Past Week at Unknown time  Yes Yes   Sig: Take 15 mg by mouth daily   omeprazole (PriLOSEC) 20 mg delayed release capsule Past Week at Unknown time  Yes Yes   Sig: Take 40 mg by mouth as needed     piroxicam (FELDENE) 20 mg capsule Unknown at Unknown time  No No   Sig: Take 1 capsule (20 mg total) by mouth daily      Facility-Administered Medications: None       Past Medical History:   Diagnosis Date    Anesthesia complication     difficult to wake up    GERD (gastroesophageal reflux disease)     Osteopenia     with joint pain-elevated DEV    Tinnitus     Wears glasses     for reading       Past Surgical History:   Procedure Laterality Date    ABDOMINAL SURGERY      lysis of adhesions x 2    APPENDECTOMY      CHOLECYSTECTOMY      open    DILATION AND CURETTAGE OF UTERUS      NEUROMA EXCISION Right 5/26/2017    Procedure: EXCISION MASS / FIBROMA FOOT;  Surgeon: Js Rollins DPM;  Location: [facility];  Service:     RIGHT OOPHORECTOMY      WISDOM TOOTH EXTRACTION      x4       Family History   Problem Relation Age of Onset    Heart disease Mother     Stroke Mother 58    COPD Mother     Hypertension Father     Kidney disease Brother      kidney transplant     I have reviewed and agree with the history as documented  Social History   Substance Use Topics    Smoking status: Never Smoker    Smokeless tobacco: Never Used    Alcohol use No        Review of Systems   Constitutional: Negative for chills, diaphoresis, fatigue and fever  HENT: Negative for congestion, hearing loss, tinnitus and trouble swallowing  Eyes: Negative for visual disturbance  Respiratory: Negative for cough, chest tightness and shortness of breath  Cardiovascular: Negative for chest pain  Gastrointestinal: Negative for abdominal distention, abdominal pain, diarrhea, nausea and vomiting  Endocrine: Negative for polydipsia, polyphagia and polyuria  Genitourinary: Negative for bladder incontinence, difficulty urinating and flank pain  Musculoskeletal: Positive for arthralgias and myalgias  Negative for neck pain and neck stiffness  Chronic myalgias, arthralgias lower extremity 1 in upper extremities, chronic diffuse paresthesias, history of fibromyalgia, history of Raynaud's   Skin: Negative for rash and wound  Allergic/Immunologic: Negative for immunocompromised state  Neurological: Positive for paresthesias  Negative for dizziness, vertigo, seizures, weakness, headaches, disturbances in coordination and loss of balance  Psychiatric/Behavioral: Positive for confusion and dysphoric mood  Negative for agitation and behavioral problems  The patient is nervous/anxious  Physical Exam  ED Triage Vitals   Temperature Pulse Respirations Blood Pressure SpO2   03/03/18 0946 03/03/18 0946 03/03/18 0946 03/03/18 0946 03/03/18 0946   98 °F (36 7 °C) (!) 124 20 (!) 190/100 100 %      Temp Source Heart Rate Source Patient Position - Orthostatic VS BP Location FiO2 (%)   03/03/18 1328 03/03/18 1328 03/03/18 1328 03/03/18 1328 --   Tympanic Monitor Lying Right arm       Pain Score       03/03/18 0946       3           Orthostatic Vital Signs  Vitals:    03/03/18 1630 03/03/18 1700 03/03/18 1730 03/03/18 1800   BP: 121/71 127/77 122/76 136/82   Pulse: 77 78 87 93   Patient Position - Orthostatic VS:           Physical Exam   Constitutional: She is oriented to person, place, and time  She appears well-developed and well-nourished  No distress  HENT:   Head: Normocephalic and atraumatic  Mouth/Throat: Oropharynx is clear and moist    Eyes: EOM are normal  Pupils are equal, round, and reactive to light  No nystagmus   Neck: Normal range of motion  Neck supple  No JVD present  Cardiovascular: Regular rhythm and intact distal pulses  Mildly tachycardic   Pulmonary/Chest: Effort normal and breath sounds normal  No stridor  Abdominal: Soft   She exhibits no distension  There is no tenderness  Musculoskeletal: Normal range of motion  Neurological: She is alert and oriented to person, place, and time  Skin: Skin is warm and dry  She is not diaphoretic  Psychiatric:   Highly anxious  Tearful at bedside  Avoids eye contact  Feels overwhelmed by all the questions and findings     Nursing note and vitals reviewed        ED Medications  Medications   atorvastatin (LIPITOR) tablet 40 mg (40 mg Oral Given 3/3/18 1757)   polyethylene glycol (MIRALAX) packet 17 g (17 g Oral Given 3/3/18 1619)   lactated ringers infusion (75 mL/hr Intravenous New Bag 3/3/18 1618)   acetaminophen (TYLENOL) tablet 650 mg (not administered)   influenza inactivated quadrivalent vaccine (FLULAVAL) IM injection 0 5 mL (not administered)   alteplase (ACTIVASE) bolus 6 3 mg (6 3 mg Intravenous Given 3/3/18 1048)   alteplase (ACTIVASE) infusion 56 9 mg (56 9 mg Intravenous Given 3/3/18 1050)       Diagnostic Studies  Results Reviewed     Procedure Component Value Units Date/Time    UA w Reflex to Microscopic w Reflex to Culture [[id number]]  (Abnormal) Collected:  03/03/18 1406    Lab Status:  Final result Specimen:  Urine from Urine, Clean Catch Updated:  03/03/18 1415     Color, UA Light Yellow     Clarity, UA Clear     Specific Gravity, UA 1 010     pH, UA 7 0     Leukocytes, UA Negative     Nitrite, UA Negative     Protein, UA Negative mg/dl      Glucose, UA Negative mg/dl      Ketones, UA Negative mg/dl      Urobilinogen, UA 0 2 E U /dl      Bilirubin, UA Interference- unable to analyze (A)     Blood, UA Negative    Troponin I [[id number]]  (Normal) Collected:  03/03/18 0952    Lab Status:  Final result Specimen:  Blood from Arm, Right Updated:  03/03/18 1020     Troponin I <0 02 ng/mL     Narrative:         [employer] Chemistry analyzer 99% cutoff is > 0 04 ng/mL in network labs    o cTnI 99% cutoff is useful only when applied to patients in the clinical setting of myocardial ischemia  o cTnI 99% cutoff should be interpreted in the context of clinical history, ECG findings and possibly cardiac imaging to establish correct diagnosis  o cTnI 99% cutoff may be suggestive but clearly not indicative of a coronary event without the clinical setting of myocardial ischemia  Comprehensive metabolic panel [[id number]]  (Abnormal) Collected:  03/03/18 0952    Lab Status:  Final result Specimen:  Blood from Arm, Right Updated:  03/03/18 1018     Sodium 137 mmol/L      Potassium 4 4 mmol/L      Chloride 102 mmol/L      CO2 26 mmol/L      Anion Gap 9 mmol/L      BUN 18 mg/dL      Creatinine 0 98 mg/dL      Glucose 146 (H) mg/dL      Calcium 9 4 mg/dL      AST 25 U/L      ALT 37 U/L      Alkaline Phosphatase 67 U/L      Total Protein 7 7 g/dL      Albumin 4 2 g/dL      Total Bilirubin 0 60 mg/dL      eGFR 62 ml/min/1 73sq m     Narrative:         National Kidney Disease Education Program recommendations are as follows:  GFR calculation is accurate only with a steady state creatinine  Chronic Kidney disease less than 60 ml/min/1 73 sq  meters  Kidney failure less than 15 ml/min/1 73 sq  meters  Magnesium [[id number]]  (Normal) Collected:  03/03/18 0952    Lab Status:  Final result Specimen:  Blood from Arm, Right Updated:  03/03/18 1018     Magnesium 1 9 mg/dL     APTT [[id number]]  (Normal) Collected:  03/03/18 0952    Lab Status:  Final result Specimen:  Blood from Arm, Right Updated:  03/03/18 1012     PTT 25 seconds     Narrative:          Therapeutic Heparin Range = 60-90 seconds    Fingerstick Glucose (POCT) [[id number]]  (Normal) Collected:  03/03/18 0939    Lab Status:  Final result Updated:  03/03/18 1012     POC Glucose 125 mg/dl     Protime-INR [[id number]]  (Normal) Collected:  03/03/18 0952    Lab Status:  Final result Specimen:  Blood from Arm, Right Updated:  03/03/18 1012     Protime 10 3 seconds      INR 0 98    CBC and differential [[id number]]  (Abnormal) Collected:  03/03/18 0952    Lab Status:  Final result Specimen:  Blood from Arm, Right Updated:  03/03/18 1002     WBC 8 20 Thousand/uL      RBC 5 23 Million/uL      Hemoglobin 14 8 g/dL      Hematocrit 44 8 %      MCV 86 fL      MCH 28 3 pg      MCHC 33 1 g/dL      RDW 14 4 %      MPV 8 6 (L) fL      Platelets 362 Thousands/uL      nRBC 0 /100 WBCs      Neutrophils Relative 69 %      Lymphocytes Relative 21 %      Monocytes Relative 8 %      Eosinophils Relative 1 %      Basophils Relative 0 %      Neutrophils Absolute 5 70 Thousands/µL      Lymphocytes Absolute 1 70 Thousands/µL      Monocytes Absolute 0 60 Thousand/µL      Eosinophils Absolute 0 10 Thousand/µL      Basophils Absolute 0 00 Thousands/µL                  CT follow up   Final Result by Arlen Bermudez MD (03/03 1806)      CT head without contrast   Final Result by Willy García MD (03/03 1313)      No acute intracranial abnormality  Workstation performed: [id number]         XR stroke alert portable chest   Final Result by Yue Christensen MD (03/03 1053)      No acute cardiopulmonary disease  Workstation performed: [id number]         CT stroke alert brain   Final Result by Tiffanie Weber DO (03/03 5673)      No intracranial hemorrhage or acute large vessel territorial infarct detected        Findings were directly discussed with Jeffrey Malik on 3/3/2018 9:53 AM       Workstation performed: [id number]         CT head wo contrast    (Results Pending)   MRI inpatient order    (Results Pending)              Procedures  CriticalCare Time  Performed by: Anabel Vo by: Ana Nur     Critical care provider statement:     Critical care time (minutes):  120    Critical care time was exclusive of:  Separately billable procedures and treating other patients    Critical care was necessary to treat or prevent imminent or life-threatening deterioration of the following conditions:  CNS failure or compromise    Critical care was time spent personally by me on the following activities:  Obtaining history from patient or surrogate, development of treatment plan with patient or surrogate, discussions with consultants, blood draw for specimens, discussions with primary provider, evaluation of patient's response to treatment, examination of patient, interpretation of cardiac output measurements, ordering and performing treatments and interventions, ordering and review of laboratory studies, ordering and review of radiographic studies, re-evaluation of patient's condition and review of old charts           Phone Contacts  ED Phone Contact    ED Course  ED Course              NIH Stroke Scale    Flowsheet Row Most Recent Value   Level of Consciousness (1a )  0 Filed at: 03/03/2018 1600   LOC Questions (1b )  0 Filed at: 03/03/2018 1600   LOC Commands (1c )  0 Filed at: 03/03/2018 1600   Best Gaze (2 )  0 Filed at: 03/03/2018 1600   Visual (3 )  1 Filed at: 03/03/2018 1600   Facial Palsy (4 )  1 Filed at: 03/03/2018 1600   Motor Arm, Left (5a )  0 Filed at: 03/03/2018 1600   Motor Arm, Right (5b )  0 Filed at: 03/03/2018 1600   Motor Leg, Left (6a )  2 Filed at: 03/03/2018 1600   Motor Leg, Right (6b )  0 Filed at: 03/03/2018 1600   Limb Ataxia (7 )  0 Filed at: 03/03/2018 1600   Sensory (8 )  1 Filed at: 03/03/2018 1600   Best Language (9 )  0 Filed at: 03/03/2018 1600   Dysarthria (10 )  0 Filed at: 03/03/2018 1600   Extinction and Inattention (11 ) (Formerly Neglect)  0 Filed at: 03/03/2018 1600   Total  5 Filed at: 03/03/2018 1600                        MDM  Number of Diagnoses or Management Options  Left-sided weakness: new and requires workup  Diagnosis management comments: Left facial numbness, possible facial droop  Worsening symptoms in the ED  Rule out CVA/TIA, infection/sepsis, metabolic/electrolyte abnormalities; anxiety disorder  - POC glucose  - EKG  - Labs, including Perez, Mag  - UA  - CXR  - CTH, CTA  - Neuro eval  - TPA   - Critical Care Adm       Amount and/or Complexity of Data Reviewed  Clinical lab tests: ordered and reviewed  Tests in the radiology section of CPT®: ordered and reviewed  Tests in the medicine section of CPT®: reviewed and ordered  Decide to obtain previous medical records or to obtain history from someone other than the patient: yes  Obtain history from someone other than the patient: yes (Spouse)  Review and summarize past medical records: yes  Discuss the patient with other providers: yes (Dr Keri Kohler - into evaluate patient at bedside, witness on worsening condition by patient, poor effort and highly anxious, ? Somatoform d/o, cannot exclude CVA - decision to give TPA discussed with patient- pt agreed to tx)  Independent visualization of images, tracings, or specimens: yes    Risk of Complications, Morbidity, and/or Mortality  Presenting problems: high  Diagnostic procedures: moderate  Management options: high    Patient Progress  Patient progress: stable    The patient presented with a condition in which there was a high probability of imminent or life-threatening deterioration, and critical care services (excluding separately billable procedures) totalled > 105 minutes          Disposition  Final diagnoses:   Left-sided weakness     Time reflects when diagnosis was documented in both MDM as applicable and the Disposition within this note     Time User Action Codes Description Comment    3/3/2018  9:45 AM Arturmanjinder Phan Add [R53 1] Left-sided weakness       ED Disposition     ED Disposition Condition Comment    Admit  Case was discussed with Dr Patrice Tafoya, [facility] (with Dr Woo Jules) and the patient's admission status was agreed to be MICU to the service of Dr Woo Jules        Follow-up Information    None       Current Discharge Medication List      CONTINUE these medications which have NOT CHANGED    Details   calcium citrate-vitamin D (CITRACAL+D) 315-200 MG-UNIT per tablet Take by mouth daily      etodolac (LODINE) 400 MG tablet Take 1 tablet (400 mg total) by mouth 2 (two) times a day for 30 days  Qty: 60 tablet, Refills: 0    Associated Diagnoses: Right foot pain      gabapentin (NEURONTIN) 100 mg capsule Take 1 capsule (100 mg total) by mouth 3 (three) times a day for 30 days  Qty: 90 capsule, Refills: 0    Associated Diagnoses: Right foot pain      meloxicam (MOBIC) 15 mg tablet Take 15 mg by mouth daily      omeprazole (PriLOSEC) 20 mg delayed release capsule Take 40 mg by mouth as needed        EPINEPHrine (EPIPEN) 0 3 mg/0 3 mL SOAJ USE AS DIRECTED IN CASE OF SEVERE ALLERGIC REACTION- SHORTNESS OF   (REFER TO PRESCRIPTION NOTES)  Refills: 0      piroxicam (FELDENE) 20 mg capsule Take 1 capsule (20 mg total) by mouth daily  Qty: 30 capsule, Refills: 1    Associated Diagnoses: Difficulty walking           No discharge procedures on file      ED Provider  Electronically Signed by           Rowena Kapoor MD  03/03/18 0146

## 2018-03-03 NOTE — CASE MANAGEMENT
Initial Clinical Review    Admission: Date/Time/Statement: 3/3/18 @ 1119     Orders Placed This Encounter   Procedures    Inpatient Admission (expected length of stay for this patient is greater than two midnights)     Standing Status:   Standing     Number of Occurrences:   1     Order Specific Question:   Admitting Physician     Answer:   Ermelinda Pike [[id number]]     Order Specific Question:   Level of Care     Answer:   Critical Care [15]     Order Specific Question:   Estimated length of stay     Answer:   More than 2 Midnights     Order Specific Question:   Certification     Answer:   I certify that inpatient services are medically necessary for this patient for a duration of greater than two midnights  See H&P and MD Progress Notes for additional information about the patient's course of treatment  ED: Date/Time/Mode of Arrival:   ED Arrival Information     Expected Arrival Acuity Means of Arrival Escorted By Service Admission Type    - 3/3/2018 09:32 Immediate Walk-In Spouse Critical Care/ICU Emergency    Arrival Complaint    CHEST PAIN AND NUMBNESS IN FACE          Chief Complaint:   Chief Complaint   Patient presents with    Facial Numbness     Pt sts started this am with left sided facial numbness and feels weak on left leg  Pt tearful, sts has had left leg pain and foot pain  Also c/o bilateral hand tingling and sweating       History of Illness: Radha Fonseca is a 65 y o  female who presents to the ED w/ c/o left facial numbness and left leg weakness, also c/o B/L hand tingling and sweating that started at 0830 this morning  She was taken to CT scan and not found to have any bleeding, so TPA was initiated after ED discussion Dr Brit Wilcox of neurology  During the TPA infusion pt began c/o blurred vision in the right eye which progressed to a right visual field deficit  Emergent CT head was done and found to have no bleeding  Pt still c/o left weakness and right visual filed loss   Blurred vision is better     Unable to lift left leg against gravity, left arm with movement but weakness against gravity; right side 5/5, equal strength; right peripheral vision loss     ED Vital Signs:   ED Triage Vitals   Temperature Pulse Respirations Blood Pressure SpO2   03/03/18 0946 03/03/18 0946 03/03/18 0946 03/03/18 0946 03/03/18 0946   98 °F (36 7 °C) (!) 124 20 (!) 190/100 100 %      Temp Source Heart Rate Source Patient Position - Orthostatic VS BP Location FiO2 (%)   03/03/18 1328 03/03/18 1328 03/03/18 1328 03/03/18 1328 --   Tympanic Monitor Lying Right arm       Pain Score       03/03/18 0946       3        Wt Readings from Last 1 Encounters:   03/03/18 71 6 kg (157 lb 13 6 oz)       Abnormal Labs/Diagnostic Test Results:   GLUCOSE RANDOM mg/dL 146*   CT HEAD  No intracranial hemorrhage or acute large vessel territorial infarct detected   CXR  No acute cardiopulmonary disease  2ND CT SCAN HEAD  No acute intracranial abnormality  ED Treatment:   Medication Administration from 03/03/2018 0932 to 03/03/2018 1344       Date/Time Order Dose Route Action Action by Comments     03/03/2018 1048 alteplase (ACTIVASE) bolus 6 3 mg 6 3 mg Intravenous Given Rena Darling RN      03/03/2018 1050 alteplase (ACTIVASE) infusion 56 9 mg 56 9 mg Intravenous Given Rena Darling RN           Past Medical/Surgical History:    Active Ambulatory Problems     Diagnosis Date Noted    Alcohol dependence in remission ([facility] ) 04/19/2016    Allergic rhinitis 08/04/2016    Arthropathy of multiple sites 09/04/2012    Depression 09/04/2012    Esophagitis 09/04/2012    Irritable bowel syndrome 09/04/2012    Raynaud's syndrome without gangrene 09/04/2012    Osteopenia 03/27/2017    Ocular migraine 01/25/2016    Acute cystitis without hematuria 02/05/2018    Acute pain of left knee 02/05/2018    Pain in both feet 02/15/2018    Lesion of left plantar nerve 02/15/2018    Lesion of right plantar nerve 02/15/2018  Congenital pes planus 02/15/2018    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/15/2018    Right foot pain 02/28/2018    Radiculopathy of lumbar region 02/28/2018    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[facility] ) 02/28/2018     Resolved Ambulatory Problems     Diagnosis Date Noted    No Resolved Ambulatory Problems     Past Medical History:   Diagnosis Date    Anesthesia complication     GERD (gastroesophageal reflux disease)     Osteopenia     Tinnitus     Wears glasses        Admitting Diagnosis: Chest pain [R07 9]  Facial numbness [R20 0]  Left-sided weakness [R53 1]    Age/Sex: 65 y o  female    Assessment/Plan:   Active Problems:    Depression    Irritable bowel syndrome    Osteopenia    Radiculopathy of lumbar region    Acute ischemic stroke ([facility] )  Resolved Problems:    * No resolved hospital problems  *     Acute ischemic stroke [facility])   Assessment & Plan     TPA given in the ED, will need to follow neuro check per protocol; Monitor for any bleeding sources; Neuro c/s placed, Dr Earlene Maldonado already evaluated; repeat CTH in 24 hours      Radiculopathy of lumbar region   Assessment & Plan     Hold NSAIDs for now      Depression   Assessment & Plan     Restart home Neurontin        Plan:  Neuro:   · Frequent neuro checks, NIH stroke scale baseline  CV:   · Keep SBP<180   Pulm:   · Resp unlabored, O2 as needed   GI:   · NPO for now, needs bedside swallow eval   :  · Voiding on own   FEN:   · Labs stable will re-evaluate tomorrow   ID:   · No active infectious source   Heme:   · Hgb stable, monitor for any bleeding   Endo:   · BS stable, no hx DM   MSK/Skin:   · PT/OT evals placed   Family:  · Family updated: no    Disposition: Admit to ICU for frequent neuro checks     NEURO CONSULT  Assessment/Plan:  1  Left sided weakness, s/p IV tPA at 10:49 on 3/3/18  2  Probable right lacunar stroke vs non neurological symptoms  3  HTN  4  H/o anxiety disorder  This was a difficult case   She has a lot of anxiety history going against treating but at the same time, she has left leg significant motor deficit with left facial asymmetry and numbness where stroke is definitely not excluded  We offered IV tPA and discussed risks vs benefits  We proceeded with treatment  We could not meet door to needle 60 min time frame because patient had no IV access  3 separate IV placed were all blown  Could only give bolus after first successful IV access after ultrasound guide at 10:49am  Patient first reported 2/10 headache but we monitored, later she reported blurred vision so tPA infusion was stopped and she was sent to get repeat CT brain along with CTA that was pending  It was negative for hemorrhage so we completed remaining infusion     Admit to ICU for 24 hrs  Permissive hypertension  No antiplatelet, anticoagulation for next 24 hrs  MRI brain w/wo contrast  TTE w/ shunt  CTA head and neck is negative for flow limiting stenosis  lipitor 40mg  Speech and swallow evaluation  Neuro checks q4h  PT/OT  Call with questions      Admission Orders:  ICU  NPO  NEURO CHECKS  MR   ECHO  PTOT  HGBA1C   LIPID PANEL DIRECT LDL  STOOL OCCULT BLOOD X3   DAILY WEIGHT I/O  IS  FALL PRECAUTIONS  Scheduled Meds:   Current Facility-Administered Medications:  acetaminophen 650 mg Oral Q6H PRN SHRAVAN Robledo   atorvastatin 40 mg Oral QPM SHRAVAN Robledo   lactated ringers 75 mL/hr Intravenous Continuous SHRAVAN Robledo   polyethylene glycol 17 g Oral Daily SHRAVAN Robledo     Continuous Infusions:   lactated ringers 75 mL/hr     PRN Meds:   acetaminophen

## 2018-03-03 NOTE — STROKE DOCUMENTATION
Pt to ct scan at 0945 ,   First scan completed,   IV infiltrated on test flush for CTA,  Multiple IV sticks attempted without success  JUMANA morse at bedside to attempt IV insertion,   Dr Bipin Wade at bedside in ct scan to attempt IV ,   All attempts unsuccessful, pt brought back to ED,   IV placed by Dr Bipin Wade at bedside using ultrasound guidance     Initial bolus of Alteplase given at 10:49

## 2018-03-04 ENCOUNTER — APPOINTMENT (INPATIENT)
Dept: RADIOLOGY | Facility: HOSPITAL | Age: 63
DRG: 880 | End: 2018-03-04
Payer: COMMERCIAL

## 2018-03-04 LAB
CHOLEST SERPL-MCNC: 158 MG/DL (ref 50–200)
EST. AVERAGE GLUCOSE BLD GHB EST-MCNC: 126 MG/DL
HBA1C MFR BLD: 6 % (ref 4.2–6.3)
HDLC SERPL-MCNC: 59 MG/DL (ref 40–60)
LDLC SERPL CALC-MCNC: 84 MG/DL (ref 0–100)
TRIGL SERPL-MCNC: 75 MG/DL

## 2018-03-04 PROCEDURE — 83036 HEMOGLOBIN GLYCOSYLATED A1C: CPT | Performed by: NURSE PRACTITIONER

## 2018-03-04 PROCEDURE — 99232 SBSQ HOSP IP/OBS MODERATE 35: CPT | Performed by: ANESTHESIOLOGY

## 2018-03-04 PROCEDURE — G8996 SWALLOW CURRENT STATUS: HCPCS

## 2018-03-04 PROCEDURE — G8987 SELF CARE CURRENT STATUS: HCPCS

## 2018-03-04 PROCEDURE — 80061 LIPID PANEL: CPT | Performed by: ANESTHESIOLOGY

## 2018-03-04 PROCEDURE — G8978 MOBILITY CURRENT STATUS: HCPCS

## 2018-03-04 PROCEDURE — G8979 MOBILITY GOAL STATUS: HCPCS

## 2018-03-04 PROCEDURE — 99233 SBSQ HOSP IP/OBS HIGH 50: CPT | Performed by: PSYCHIATRY & NEUROLOGY

## 2018-03-04 PROCEDURE — G8988 SELF CARE GOAL STATUS: HCPCS

## 2018-03-04 PROCEDURE — 70450 CT HEAD/BRAIN W/O DYE: CPT

## 2018-03-04 PROCEDURE — 97167 OT EVAL HIGH COMPLEX 60 MIN: CPT

## 2018-03-04 PROCEDURE — G8998 SWALLOW D/C STATUS: HCPCS

## 2018-03-04 PROCEDURE — G8997 SWALLOW GOAL STATUS: HCPCS

## 2018-03-04 PROCEDURE — 97163 PT EVAL HIGH COMPLEX 45 MIN: CPT

## 2018-03-04 PROCEDURE — 97530 THERAPEUTIC ACTIVITIES: CPT

## 2018-03-04 PROCEDURE — 92610 EVALUATE SWALLOWING FUNCTION: CPT

## 2018-03-04 RX ORDER — GABAPENTIN 100 MG/1
100 CAPSULE ORAL 3 TIMES DAILY
Status: DISCONTINUED | OUTPATIENT
Start: 2018-03-04 | End: 2018-03-07 | Stop reason: HOSPADM

## 2018-03-04 RX ORDER — ASPIRIN 325 MG
325 TABLET, DELAYED RELEASE (ENTERIC COATED) ORAL DAILY
Status: DISCONTINUED | OUTPATIENT
Start: 2018-03-05 | End: 2018-03-05

## 2018-03-04 RX ADMIN — POLYETHYLENE GLYCOL 3350 17 G: 17 POWDER, FOR SOLUTION ORAL at 08:21

## 2018-03-04 RX ADMIN — GABAPENTIN 100 MG: 100 CAPSULE ORAL at 15:44

## 2018-03-04 RX ADMIN — GABAPENTIN 100 MG: 100 CAPSULE ORAL at 21:55

## 2018-03-04 RX ADMIN — SODIUM CHLORIDE, SODIUM LACTATE, POTASSIUM CHLORIDE, AND CALCIUM CHLORIDE 75 ML/HR: .6; .31; .03; .02 INJECTION, SOLUTION INTRAVENOUS at 04:39

## 2018-03-04 RX ADMIN — ATORVASTATIN CALCIUM 40 MG: 40 TABLET, FILM COATED ORAL at 18:28

## 2018-03-04 NOTE — PROGRESS NOTES
Patient left the unit via wheelchair, not in respiratory distress, no pain and discomfort, report given to [address], patient endorsed, patient will call  to inform of transfer

## 2018-03-04 NOTE — PROGRESS NOTES
Daily Progress Note - Critical Care/ Stepdown   Renetta Au 65 y o  female MRN: [id number]  Unit/Bed#: ICU 10 Encounter: [id number]    ______________________________________________________________________  Assessment:   Principal Problem:    Acute ischemic stroke [facility])  Active Problems:    Depression    Irritable bowel syndrome    Osteopenia    Radiculopathy of lumbar region  Resolved Problems:    * No resolved hospital problems   *      * Acute ischemic stroke [facility])   Assessment & Plan    TPA given in the ED yesterday, neuro checks stable; exam has been inconsistent; still with reported weakness on the left that is not always consistent as well; Neuro Dr Elaine Chisholm following; repeat CTH in 24 hours @ 1200 today; PT/OT evals pending; Speech eval pending; passed bedside swallow with nursing, Lipitor started Lipid panel pending        Radiculopathy of lumbar region   Assessment & Plan    Hold NSAIDs for now        Depression   Assessment & Plan    Restart home Neurontin            Plan:    Neuro:   · Sedation plan: none   · Pain: no c/o pain  · Regulate sleep/wake cycle    CV:   · Rhythm: NSR  · Follow rhythm on telemetry    Pulm:   · Tolerating room air    GI:   · Nutrition/diet plan: Cardiac  · Stress ulcer prophylaxis: Protonix PO  · Bowel regimen: none  · Last BM: prior to admission     FEN:   · Labs pending for today  · Fluid/Diuretic plan: No intervention  · Electrolytes repleted: no  · Goal: K >4 0, Mag >2 0, and Phos >3 0    :   · Indwelling Echavarria present: no     ID:   · No active infectious source  · Trend temps and WBC count as needed    Heme:   · Hgb stable  · Trend hgb and plts    Endo:   · BS stable  · Glycemic control plan: Blood glucose controlled on current regimen    Msk/Skin:  · Mobility goal: OOB as tolerated  · PT consult: yes  · OT consult: yes  · Frequent turning and off-loading    Family:  · Family updated within 24 hours: yes   · Family meeting planned today: no     Lines:  · PIV    VTE Prophylaxis:  · Pharmacologic Prophylaxis: Reason for no pharmacologic prophylaxis TPA  · Mechanical Prophylaxis: sequential compression device    Disposition: Transfer to telemetry    Code Status: Level 1 - Full Code    Counseling / Coordination of Care  Total time spent today 36 minutes  Greater than 50% of total time was spent with the patient and / or family counseling and / or coordination of care  A description of the counseling / coordination of care: evaluating records, examining patient; writing note and placing orders  ______________________________________________________________________    HPI/24hr events: no issues overnight; nursing reports pt exam not always consistent; She states she feels better but still c/o left sided weakness but denies any visual changes, headaches or dizziness  Tolerating diet    ______________________________________________________________________    Physical Exam:   Physical Exam   Constitutional: She is oriented to person, place, and time  She appears well-developed and well-nourished  No distress  HENT:   Head: Normocephalic  Eyes: Pupils are equal, round, and reactive to light  Neck: Normal range of motion  Cardiovascular: Normal rate and regular rhythm  Pulmonary/Chest: Effort normal and breath sounds normal  No respiratory distress  Abdominal: Soft  Bowel sounds are normal    Musculoskeletal: Normal range of motion  She exhibits no edema  Neurological: She is alert and oriented to person, place, and time  Weakness of left arm against gravity; weakness to LLE against gravity, improved from yesterday   Skin: Skin is warm and dry  Capillary refill takes less than 2 seconds  She is not diaphoretic  Psychiatric: Her behavior is normal  Her mood appears anxious  Nursing note and vitals reviewed        ______________________________________________________________________  Vitals:    03/04/18 0900 03/04/18 1027 03/04/18 1100 03/04/18 1141   BP: 135/81 126/64 119/84    BP Location:       Pulse: 97 103 95    Resp: (!) 31 (!) 38 17    Temp:    97 8 °F (36 6 °C)   TempSrc:    Temporal   SpO2: (!) 88% 100% 100%    Weight:       Height:                  Temperature:   Temp (24hrs), Av 9 °F (36 6 °C), Min:97 4 °F (36 3 °C), Max:98 8 °F (37 1 °C)    Current Temperature: 97 8 °F (36 6 °C)    Weights:   IBW: 57 kg    Body mass index is 26 41 kg/m²  Weight (last 2 days)     Date/Time   Weight    18 0533  72 (158 73)    18 1400  71 6 (157 85)    18 0946  70 3 (155)              Hemodynamic Monitoring:  N/A       Non-Invasive/Invasive Ventilation Settings:  Respiratory    Lab Data (Last 4 hours)    None         O2/Vent Data (Last 4 hours)    None              No results found for: PHART, EQX3EEH, PO2ART, BLR2MOK, X7ELOATM, BEART, SOURCE  SpO2: SpO2: 100 %    Intake and Outputs:  I/O        07 -  0700  07 -  0700  07 -  0700    P  O   240     I V  (mL/kg)  1183 8 (16 4)     Total Intake(mL/kg)  1423 8 (19 8)     Urine (mL/kg/hr)  1500 450 (1 1)    Total Output   1500 450    Net   -76 3 -450                 Nutrition:        Diet Orders            Start     Ordered    18  Diet Cardiac; Cardiac Step 1  Diet effective now     Question Answer Comment   Diet Type Cardiac    Cardiac Cardiac Step 1    RD to adjust diet per protocol?  Yes        18 1714        Labs:     Results from last 7 days  Lab Units 18  0952   WBC Thousand/uL 8 20   HEMOGLOBIN g/dL 14 8   HEMATOCRIT % 44 8   PLATELETS Thousands/uL 255   NEUTROS PCT % 69   MONOS PCT % 8       Results from last 7 days  Lab Units 18  0952   SODIUM mmol/L 137   POTASSIUM mmol/L 4 4   CHLORIDE mmol/L 102   CO2 mmol/L 26   BUN mg/dL 18   CREATININE mg/dL 0 98   CALCIUM mg/dL 9 4   TOTAL PROTEIN g/dL 7 7   BILIRUBIN TOTAL mg/dL 0 60   ALK PHOS U/L 67   ALT U/L 37   AST U/L 25   GLUCOSE RANDOM mg/dL 146*       Results from last 7 days  Lab Units 03/03/18  0952   MAGNESIUM mg/dL 1 9     No results found for: PHOS     Results from last 7 days  Lab Units 03/03/18 0952   INR  0 98   PTT seconds 25       0  Lab Value Date/Time   TROPONINI <0 02 03/03/2018 0952         ABG:No results found for: PHART, CEP6VHS, PO2ART, PKI4HCO, A9ZSEJSL, BEART, SOURCE    Imaging: CXR: none today I have personally reviewed pertinent reports  CT: Head- pending I have personally reviewed pertinent reports  EKG: NSR    Micro:  No results found for: Keven Po, SPUTUMCULTUR    Allergies: Allergies   Allergen Reactions    Demerol [Meperidine] Lightheadedness     Reaction Date: 11Jan2006;     Sulfa Antibiotics Hives     Reaction Date: 11Jan2006;      Medications:   Scheduled Meds:  Current Facility-Administered Medications:  acetaminophen 650 mg Oral Q6H PRN SHRAVAN Magana   atorvastatin 40 mg Oral QPM SHRAVAN Magana   gabapentin 100 mg Oral TID SHRAVAN Magana   influenza vaccine 0 5 mL Intramuscular Prior to discharge Uche Durand MD   polyethylene glycol 17 g Oral Daily SHRAVAN Magana     Continuous Infusions:   PRN Meds:    acetaminophen 650 mg Q6H PRN   influenza vaccine 0 5 mL Prior to discharge       Invasive lines and devices:   Invasive Devices     Peripheral Intravenous Line            Peripheral IV 03/03/18 Right Hand less than 1 day                   SIGNATURE: SHRAVAN Magana  DATE: March 4, 2018   TIME: Patient was seen and evaluated at 0730 this am but I was unable to complete the note until later due to ICU acuity

## 2018-03-04 NOTE — PLAN OF CARE
DISCHARGE PLANNING     Discharge to home or other facility with appropriate resources Progressing        GASTROINTESTINAL - ADULT     Maintains adequate nutritional intake Progressing        HEMATOLOGIC - ADULT     Maintains hematologic stability Progressing        INFECTION - ADULT     Absence or prevention of progression during hospitalization Progressing     Absence of fever/infection during neutropenic period Progressing        Knowledge Deficit     Patient/family/caregiver demonstrates understanding of disease process, treatment plan, medications, and discharge instructions Progressing        METABOLIC, FLUID AND ELECTROLYTES - ADULT     Electrolytes maintained within normal limits Progressing     Fluid balance maintained Progressing        MUSCULOSKELETAL - ADULT     Maintain or return mobility to safest level of function Progressing        NEUROSENSORY - ADULT     Achieves stable or improved neurological status Progressing     Achieves maximal functionality and self care Progressing        PAIN - ADULT     Verbalizes/displays adequate comfort level or baseline comfort level Progressing        Prexisting or High Potential for Compromised Skin Integrity     Skin integrity is maintained or improved Progressing        SAFETY ADULT     Patient will remain free of falls Progressing     Maintain or return to baseline ADL function Progressing     Maintain or return mobility status to optimal level Progressing        SKIN/TISSUE INTEGRITY - ADULT     Skin integrity remains intact Progressing     Oral mucous membranes remain intact Progressing

## 2018-03-04 NOTE — OCCUPATIONAL THERAPY NOTE
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[employer] [place] [address]  [address] 3  Moderate Assistance   [address] 3  Moderate Assistance   LB Dressing Deficit (supervision to doff/farzad left socks using BUE)   [address]  3  Moderate Assistance   Additional Comments per aide pt completed pericare supine during am care    Bed Mobility   Supine to Sit 3  Moderate assistance   Additional items Verbal cues   Additional Comments pt is very anxious hold LLE out and not placing it on the floor while seated, with therapist assistance able to flex foot/knee to the floor  Patient requires increased encouagement and is very tearful  Patient given relaxation strategies      Transfers   Sit to Stand 3  Moderate assistance   Stand to Sit 3  Moderate assistance   Stand pivot (mod assist of 1, min of another with verbal cues )   Additional items (bed to chair with hand hold assist, pt gripped on left UE )   Additional Comments testing does not seem consistent with LUE/LLE   Functional Mobility   Functional Mobility (mod assist of 1 min assist of another )   Additional Comments few steps bed to chair, pt favoring LLE   Balance   Static Sitting Fair   Dynamic Sitting Fair -   Static Standing Poor   Dynamic Standing Poor   Activity Tolerance   Activity Tolerance Patient limited by fatigue  (anxiety and weakness )   RUE Assessment   RUE Assessment WFL  (4/5)   LUE Overall AROM   L Shoulder Flexion 90 degrees shoulder flexion, MMT 4-/5   L Elbow Flexion WFL, MMT 4-/5   L Mass Grasp WFL, MMT 4-/5, testing is inconsistent    Hand Function   Gross Motor Coordination Impaired  (LUE/LLE)   Fine Motor Coordination Impaired  (oppostition impaired LUE)   Sensation   Light Touch (pt reports tingling in left face )   Cognition   Overall Cognitive Status WFL   Arousal/Participation Cooperative   Attention Within functional limits   Orientation Level Oriented X4   Following Commands Follows all commands and directions without difficulty   Comments pt is very anxious   Assessment   Limitation Decreased ADL status; Decreased UE ROM; Decreased UE strength;Decreased Safe judgement during ADL;Decreased endurance;Decreased high-level ADLs; Decreased self-care trans  (decreased balance and mobility )   Prognosis Good   Assessment Patient evaluated by Occupational Therapy  Patient admitted with Acute ischemic stroke [facility])  The patients occupational profile, medical and therapy history includes a extensive additional review of physical, cognitive, or psychosocial history related to current functional performance  Comorbidities affecting functional mobility and ADLS include:abdominal surgery, osteopenia and GERD  Prior to admission, patient was independent with functional mobility without assistive device, independent with ADLS, independent with IADLS and works full time    The evaluation identifies the following performance deficits: weakness, decreased ROM, impaired balance, decreased endurance, decreased coordination, increased fall risk, new onset of impairment of functional mobility, decreased ADLS, decreased IADLS, decreased activity tolerance, decreased safety awareness, impaired judgement, decreased sensation and decreased strength, that result in activity limitations and/or participation restrictions  This evaluation requires clinical decision making of high complexity, because the patient presents with comorbidites that affect occupational performance and required significant modification of tasks or assistance with consideration of multiple treatment options  The Barthel Index was used as a functional outcome tool presenting with a score of 40, indicating marked limitations of functional mobility and ADLS  Patient will benefit from skilled Occupational Therapy services to address above deficits and facilitate a safe return to prior level of function  Goals   Patient Goals be independent    STG Time Frame (1-7 days)   Short Term Goal  Patient will increase standing tolerance to 4 minutes during functional activity; Patient will increase bed mobility to min assist; Patient will increase functional mobility to and from bathroom with rolling walker with min assist to increase performance with ADLS; Patient will tolerate 8 minutes of UE ROM/strengthening to increase general activity tolerance and performance in ADLS/IADLS; Patient will improve functional activity tolerance to 10 minutes of sustained functional tasks to increase participation in basic self-care and decrease assistance level  LTG Time Frame (8-14 days)   Long Term Goal Patient will increase standing tolerance to 8 minutes during functional activity; Patient will increase bed mobility to supervision; Patient will increase functional mobility to and from bathroom with rolling walker with supervision to increase performance with ADLS; Patient will tolerate 12 minutes of UE ROM/strengthening to increase general activity tolerance and performance in ADLS/IADLS; Patient will improve functional activity tolerance to 20 minutes of sustained functional tasks to increase participation in basic self-care and decrease assistance level     Functional Transfer Goals   Pt Will Perform All Functional Transfers (STG min assist LTG supervision ) ADL Goals   Pt Will Perform Eating (STG independent )   Pt Will Perform Grooming (STG independent )   Pt Will Perform Bathing (STG min assist LTG supervision )   Pt Will Perform UE Dressing (STG supervision LTG independent )   Pt Will Perform LE Dressing (STG min assist LTG supervision )   Pt Will Perform Toileting (STG min assist LTG supervision )   Plan   Treatment Interventions ADL retraining;Functional transfer training;UE strengthening/ROM; Endurance training;Patient/family training;Equipment evaluation/education; Activityengagement; Compensatory technique education; Fine motor coordination activities   OT Frequency 3-5x/wk   Recommendation   OT Discharge Recommendation Short Term Rehab  (acute rehab pending progress)   Barthel Index   Feeding 5   Bathing 0   Grooming Score 0   Dressing Score 5   Bladder Score 10   Bowels Score 10   Toilet Use Score 5   Transfers (Bed/Chair) Score 5   Mobility (Level Surface) Score 0   Stairs Score 0   Barthel Index Score 40   MRS- 4

## 2018-03-04 NOTE — SPEECH THERAPY NOTE
Speech Language/Pathology  Bedside Swallowing Evaluation       03/04/18 1200   Swallow Information   Current Risks for Dysphagia & Aspiration (Possible new neuro event  Left facial weakness per pt  )   Current Symptoms/Concerns (Pt  and RN deny symptoms of dysphagia )   Current Diet Regular; Thin liquid   Baseline Diet Regular; Thin liquids   Baseline Assessment   Behavior/Cognition Alert; Cooperative; Interactive;Lethargic   Speech/Language Status (Language skills WFL  Speech slightly slow and labored )   Patient Positioning Upright in chair   Swallow Mechanism Exam   Labial Symmetry WFL   Labial Strength WFL   Labial ROM WFL   Labial Sensation WFL   Facial Symmetry WFL   Facial Strength WFL   Facial ROM WFL   Facial Sensation WFL   Lingual Symmetry WFL   Lingual Strength WFL   Lingual ROM Moderate reduced   Lingual Sensation WFL   Velum WFL   Gag (did not assess )   Mandible WFL   Dentition Adequate   Volitional Cough Strong   Tracheostomy No   Consistencies Assessed and Performance   Materials Admnistered Regular/Solid; Thin liquid   Materials Adminstered Comment (see above)   Oral Stage WFL   Oral Stage Comment (Normal timing of oral preparation and transit )   Phargngeal Stage WFL   Pharyngeal Stage Comment No delay in the initiation of the swallow, adequate laryngeal elevation, no coughing or choking during or following the swallow, no symptoms of residual    Swallow Mechanics WFL   Esophageal Concerns No s/s reported   Strategies and Efficacy (none required )   Summary   Swallow Summary All stages of the swallow are WNL  Swallow skills are safe and WFL for regular consistency solids and thin liquids     Recommendations   Risk for Aspiration None   Recommendations Consider oral diet   Diet Solid Recommendation Regular consistency   Diet Liquid Recommendation Thin liquid   Recommended Form of Meds With thin liquid   General Precautions Upright as possible for all oral intake   Compensatory Swallowing Strategies (none required )   Further Evaluations (continue follow up with neurology )   Results Reviewed with RN;PT/Family/Caregiver   Treatment Recommendations   Duration of treatment (treatment for dysphagia not necessary )   Follow up treatments (not required )   Dysphagia Goals Patient will tolerate recommended diet without observed clinical signs of oral/pharngeal dysphagia   Speech Therapy Prognosis   Prognosis Good   Prognosis Considerations Age; Patient Participation Level;Previous Level of Function

## 2018-03-04 NOTE — PHYSICAL THERAPY NOTE
PT EVALUATION  Actual time: 9:13 - 9:25     03/04/18 5667   Pain Assessment   Pain Assessment No/denies pain   Home Living   Type of [address] One level  (2 [address])   Home Equipment (None)   Prior Function   Level of [place] Independent with ADLs and functional mobility   Lives With Spouse   ADL Assistance Independent   IADLs Independent   Vocational Full time employment   Comments Coordination of addition services for [facility]   Restrictions/Precautions   Other Precautions Chair Alarm; Bed Alarm;Multiple lines; Fall Risk  (S/P TPA)   General   Additional Pertinent History GERD, osteopenia, abdominal surgery   Cognition   Overall Cognitive Status WFL   Arousal/Participation Cooperative   Attention Within functional limits   Orientation Level Oriented X4   Following Commands Follows all commands and directions without difficulty   RLE Assessment   RLE Assessment WFL   LLE Assessment   LLE Assessment (WFL PROM hip; AROM minimal limitation from seated position)   Coordination   Movements are Fluid and Coordinated 0   Coordination and Movement Description Pt moves at a very SLOW pace  AROM WFLs but requires AAROm at hip and takes INCREASED time to flex /extend knee and ankle; not presenting as a weakness 2/2 pt has full essentric control as well as concentric control  Visual contraction noted for quads and gastroc musculature     Transfers   Sit to Stand 4  Minimal assistance   Additional items Assist x 1;Verbal cues  (to a RW; use or arms on chair and verbal cues)   Stand to Sit 4  Minimal assistance   Additional items Assist x 1;Verbal cues;Armrests   Ambulation/Elevation   Gait pattern (no attempts to amb  2/2 multiple lines and safety for pt)   Balance   Static Sitting Fair   Dynamic Sitting Fair -   Static Standing Fair -   Dynamic Standing Fair -   Activity Tolerance   Activity Tolerance (Limited by minimal ability to actively move her left leg)   Nurse Made Aware yesAmelita   Assessment Prognosis Good   Problem List Decreased endurance;Decreased mobility; Decreased coordination  (NOT hyper or hypotonic )   Assessment Patient seen for Physical Therapy evaluation  Patient admitted with Acute ischemic stroke [facility])  Comorbidities affecting patient's physical performance include: GERD  Personal factors affecting patient at time of initial evaluation include: lives in single story house, stairs to enter home, inability to ambulate household distances, inability to navigate community distances, inability to navigate level surfaces without external assistance, inability to perform dynamic tasks in community, anxiety, inability to perform current job functions, inability to perform ADLS and inability to perform IADLS   Prior to admission, patient was independent with functional mobility without assistive device, independent with ADLS, independent with IADLS, living with spouse in a single level home with 2 steps to enter, ambulating household distance, ambulating community distances and works full time  Please find objective findings from Physical Therapy assessment regarding body systems outlined above with impairments and limitations including decreased ROM, impaired balance, decreased endurance, impaired coordination, gait deviations, decreased activity tolerance, decreased functional mobility tolerance and fall risk  The Barthel Index was used as a functional outcome tool presenting with a score of 40 today indicating marked limitations of functional mobility and ADLS  Patient's clinical presentation is currently unstable/unpredictable as seen in patient's presentation of increased fall risk, new onset of impairment of functional mobility and decreased endurance  Pt would benefit from continued Physical Therapy treatment to address deficits as defined above and maximize level of functional mobility   As demonstrated by objective findings, the assigned level of complexity for this evaluation is high    Goals   Patient Goals Non specific   STG Expiration Date (1 - 7 days)   Short Term Goal #1 Independent bedmobs and transfers   Short Term Goal #2 Supervision for amb  80 feet with least restrictive AD and good balance   LTG Expiration Date (8 - 14 days)   Long Term Goal #1 Independent with amb  125 feet or > with least restrictive AD and good balance  Plan   Treatment/Interventions Functional transfer training; Therapeutic exercise; Endurance training;Patient/family training;Equipment eval/education; Bed mobility;Gait training;Spoke to nursing;OT   PT Frequency 5x/wk   Recommendation   Recommendation (Home w/home PT vs STR pending progress)   Modified Charleston Scale   Modified Jackie Scale 4   Barthel Index   Feeding 5   Bathing 0   Grooming Score 0   Dressing Score 5   Bladder Score 10   Bowels Score 10   Toilet Use Score 5   Transfers (Bed/Chair) Score 5   Mobility (Level Surface) Score 0   Stairs Score 0   Barthel Index Score 40     PT TREATMENT:  9:25 - 9:35    S:  "Quite a change from yesterday"  (referring to ability to move her left leg)  O:  Pt  Transferred sit <> stand with Min A and v cues to lean nose over her toes and push from armrests  Pt  Stood at [facility] to work on wgt shifting over right and left hip, progressing to mini-marches to work on lifting her feet off floor, then progressed to hip flexion and knee flexion individually and then at same time  Returned to sitting with all needs in reach  RN aware  A: tolerated well  Pt continues to be limited in the range to which she can lift her feet and legs and is slow with each activity  Increased time required  P:  Cont  PT   Recommend: home with Home PT vs STR pending progress

## 2018-03-04 NOTE — PROGRESS NOTES
Transfer Note - ICU/Stepdown Transfer to [facility]/MS tele   Marshal Kiran 65 y o  female MRN: [id number]  [facility]   Unit/Bed#: ICU 10 Encounter: [id number]    Code Status: Level 1 - Full Code    Reason for ICU/Stepdown admission: Acute Stroke w/ TPA    Active problems: Principal Problem:    Acute ischemic stroke ([address] )  Active Problems:    Depression    Irritable bowel syndrome    Osteopenia    Radiculopathy of lumbar region  Resolved Problems:    * No resolved hospital problems  *      * Acute ischemic stroke [facility])   Assessment & Plan    TPA given in the ED yesterday, neuro checks stable; exam has been inconsistent; still with reported weakness on the left that is not always consistent as well; Neuro Dr Zev Loaiza following; repeat CTH in 24 hours @ 1200 today; PT/OT evals pending; Speech eval pending; passed bedside swallow with nursing, Lipitor started Lipid panel pending        Radiculopathy of lumbar region   Assessment & Plan    Hold NSAIDs for now        Depression   Assessment & Plan    Restart home Neurontin            Consultants:   · Neuro    History of Present Illness/Summary of clinical course: 65 y o  female who presents to the ED w/ c/o left facial numbness and left leg weakness, also c/o B/L hand tingling and sweating that started at 0830 this morning  She was taken to CT scan and not found to have any bleeding, so TPA was initiated after ED discussion Dr Zev Loaiza of neurology  During the TPA infusion pt began c/o blurred vision in the right eye which progressed to a right visual field deficit  Emergent CT head was done and found to have no bleeding  In ED Pt still c/o left weakness and right visual filed loss  Blurred vision is better  Since arriving in ICU patient has had inconsistent exmas but still c/o left sided weakness  Denies any blurred vision or vision loss  Symptoms have improved and PT/OT evaluated  Speech eval, cleared for diet       Please refer to today's progress note for further clinical details  Recent or scheduled procedures: CT Head    Outstanding/pending diagnostics: MRI brain, Carotid Duplex         Mobilization Plan: OOB as tolerated    Nutrition Plan: Cardiac    Discharge Plan: Home     Specific Diagnosis Plan:    [  ] Family aware of transfer out of critical care: yes     Spoke with Dr Yasmine Smith regarding transfer @ [phone]  Patient accepted to their service      SHRAVAN Henriquez

## 2018-03-04 NOTE — PLAN OF CARE
Problem: SLP ADULT - SWALLOWING, IMPAIRED  Goal: Initial SLP swallow eval performed  Outcome: Completed Date Met: 03/04/18

## 2018-03-04 NOTE — CONSULTS
Neurology Consult Follow Up      Huyen Dejesus is a 65 y o  female  ICU 10/ICU 10    [id number]        Assessment/Recommendations:    1  Left sided weakness, s/p IV tPA at 10:49 on 3/3/18  2  Probable right lacunar stroke vs non neurological symptoms  3  HTN  4  H/o anxiety disorder     Clinically patient is slightly doing better but still has left sided weakness more prominent in left leg  She has now completed 24 hrs post tPA  *Repeat CT brain pending  If negative for hemorrhage, start her on Aspirin 325mg  Allow Permissive hypertension  Cont tele  MRI brain w/wo contrast- pending  Order carotid doppler as there has been a lot of difficulty getting good IV line for contrast    TTE w/ shunt  Cont lipitor 40mg  Speech and swallow evaluation  Neuro checks q4-6h  PT/OT  Discussed plan with patient and primary team       Chief Complaint:  Left sided weakness  Subjective:   Patient is feeling much better though continues to have left leg weakness  She denies any headache or blurred vision  Past Medical History:   Diagnosis Date    Anesthesia complication     difficult to wake up    GERD (gastroesophageal reflux disease)     Osteopenia     with joint pain-elevated DEV    Tinnitus     Wears glasses     for reading     Social History     Social History    Marital status: /Civil Union     Spouse name: N/A    Number of children: N/A    Years of education: N/A     Occupational History    Not on file  Social History Main Topics    Smoking status: Never Smoker    Smokeless tobacco: Never Used    Alcohol use No    Drug use: No    Sexual activity: Not on file     Other Topics Concern    Not on file     Social History Narrative    No narrative on file     Family History   Problem Relation Age of Onset    Heart disease Mother     Stroke Mother 58    COPD Mother     Hypertension Father     Kidney disease Brother      kidney transplant       ROS:  Please see HPI for positive symptoms     No fever, no chills, no weight change  Ocular: No drainage, no blurred vision  HEENT:  No sore throat, earache, or congestion  No neck pain  COR:  No chest pain  No palpitations  Lungs:  no sob, wheezing,  GI:  no  nausea, no vomiting, no diarrhea, no constipation, no anorexia  :  No dysuria, frequency, or urgency  No hematuria  No vaginal discharge or vaginal bleeding  Musculoskeletal:  No joint pain or swelling or edema  Skin:  No rash or itching  Psychiatric:  no anxiety, no depression  Endocrine:  No polyuria or polydipsia  Objective:  /84   Pulse 95   Temp 97 8 °F (36 6 °C) (Temporal)   Resp 17   Ht 5' 5" (1 651 m)   Wt 72 kg (158 lb 11 7 oz)   SpO2 100%   BMI 26 41 kg/m²     General: alert   Mental status: oriented x3  Attention: normal  Knowledge: fair  Language and Speech: normal  Cranial nerves: II-XII intact except left facial asymmetry and left facial decrease in sensation   Muscle tone: normal  Motor strength:  5/5 in right side, LUE: 4;5, LLE: 3/5  Sensory: grossly equal in b/l extremities   Gait: requires walker to stand and maneuver   Coordination: finger to nose and heel to toe normal  Reflexes: 2+ throughout  except as noted      Labs:      Lab Results   Component Value Date    WBC 8 20 03/03/2018    HGB 14 8 03/03/2018    HCT 44 8 03/03/2018    MCV 86 03/03/2018     03/03/2018     No results found for: HGBA1C  Lab Results   Component Value Date    ALT 37 03/03/2018    AST 25 03/03/2018    ALKPHOS 67 03/03/2018    BILITOT 0 60 03/03/2018     Lab Results   Component Value Date    GLUCOSE 146 (H) 03/03/2018    CALCIUM 9 4 03/03/2018     03/03/2018    K 4 4 03/03/2018    CO2 26 03/03/2018     03/03/2018    BUN 18 03/03/2018    CREATININE 0 98 03/03/2018         Review of reports and notes reveal:       Ct Head Without Contrast    Result Date: 3/3/2018  No acute intracranial abnormality   Workstation performed: [id number]     Xr Stroke Alert Portable Chest    Result Date: 3/3/2018  No acute cardiopulmonary disease  Workstation performed: [id number]     Ct Stroke Alert Brain    Result Date: 3/3/2018  No intracranial hemorrhage or acute large vessel territorial infarct detected  Findings were directly discussed with MARCUS PLUMMER on 3/3/2018 9:53 AM  Workstation performed: [id number]             Thank you for this consult      Total time of encounter:  35 min  More than 50% of the time was used in counseling and/or coordination of care  Extent of couseling and/or coordination of care        Lyn Puente MD  [facility]  [address] [place] [address],[address]  [place] [address]  [phone]

## 2018-03-04 NOTE — CASE MANAGEMENT
Continued Stay Review    Date: 3/4/18    Vital Signs: T 97 4    RR 38  /81  SAT 88%    ICU >TRNF TELE UNIT  NEURO CHECKS  LIPID PANEL  Medications:   Scheduled Meds:   Current Facility-Administered Medications:  acetaminophen 650 mg Oral Q6H PRN Alexsandra Franklyn, CRNP   atorvastatin 40 mg Oral QPM Alexsandra Franklyn, CRNP   gabapentin 100 mg Oral TID Alexsandra Franklyn, CRNP   influenza vaccine 0 5 mL Intramuscular Prior to discharge Clinton Frank MD   polyethylene glycol 17 g Oral Daily Cicero Franklyn, SHRAVAN     Continuous Infusions:    PRN Meds:   acetaminophen    influenza vaccine    Abnormal Labs/Diagnostic Results:   CT HEAD  No acute intracranial abnormality  No large evolving territorial infarction      Age/Sex: 65 y o  female     ICU  Acute ischemic stroke [facility])   Assessment & Plan     TPA given in the ED yesterday, neuro checks stable; exam has been inconsistent; still with reported weakness on the left that is not always consistent as well; Neuro Dr Oliverio López following; repeat CTH in 24 hours @ 1200 today; PT/OT evals pending; Speech eval pending; passed bedside swallow with nursing, Lipitor started Lipid panel pending      Radiculopathy of lumbar region   Assessment & Plan     Hold NSAIDs for now      Depression   Assessment & Plan     Restart home Neurontin        Plan:  Neuro:   · Sedation plan: none   · Pain: no c/o pain  · Regulate sleep/wake cycle  CV:   · Rhythm: NSR  ? Follow rhythm on telemetry   Pulm:   · Tolerating room air   GI:   · Nutrition/diet plan: Cardiac  · Stress ulcer prophylaxis: Protonix PO  · Bowel regimen: none  ?  Last BM: prior to admission  FEN:   · Labs pending for today  · Fluid/Diuretic plan: No intervention  · Electrolytes repleted: no  ? Goal: K >4 0, Mag >2 0, and Phos >3 0   :   · Indwelling Echavarria present: no    ID:   · No active infectious source  · Trend temps and WBC count as needed  Heme:   · Hgb stable  · Trend hgb and plt   Endo:   · BS stable  · Glycemic control plan: Blood glucose controlled on current regimen  Msk/Skin:  · Mobility goal: OOB as tolerated  ? PT consult: yes  ? OT consult: yes  · Frequent turning and off-loading  Family:  · Family updated within 24 hours: yes   · Family meeting planned today: no   Lines:  · PIV  VTE Prophylaxis:  · Pharmacologic Prophylaxis: Reason for no pharmacologic prophylaxis TPA  · Mechanical Prophylaxis: sequential compression device  Disposition: Transfer to telemetry  Code Status: Level 1 - Full Code     NEUROLOGY  Assessment/Recommendations:     1  Left sided weakness, s/p IV tPA at 10:49 on 3/3/18  2  Probable right lacunar stroke vs non neurological symptoms  3  HTN  4  H/o anxiety disorder     Clinically patient is slightly doing better but still has left sided weakness more prominent in left leg  She has now completed 24 hrs post tPA  *Repeat CT brain pending   If negative for hemorrhage, start her on Aspirin 325mg  Allow Permissive hypertension  Cont tele  MRI brain w/wo contrast- pending  Order carotid doppler as there has been a lot of difficulty getting good IV line for contrast    TTE w/ shunt  Cont lipitor 40mg  Speech and swallow evaluation  Neuro checks q4-6h  PT/OT  Discussed plan with patient and primary team

## 2018-03-05 ENCOUNTER — APPOINTMENT (INPATIENT)
Dept: NON INVASIVE DIAGNOSTICS | Facility: HOSPITAL | Age: 63
DRG: 880 | End: 2018-03-05
Payer: COMMERCIAL

## 2018-03-05 ENCOUNTER — APPOINTMENT (INPATIENT)
Dept: RADIOLOGY | Facility: HOSPITAL | Age: 63
DRG: 880 | End: 2018-03-05
Payer: COMMERCIAL

## 2018-03-05 PROBLEM — R29.818 FOCAL NEUROLOGICAL DEFICIT: Status: ACTIVE | Noted: 2018-03-03

## 2018-03-05 LAB
ALBUMIN SERPL BCP-MCNC: 3.2 G/DL (ref 3.5–5)
ALP SERPL-CCNC: 50 U/L (ref 46–116)
ALT SERPL W P-5'-P-CCNC: 27 U/L (ref 12–78)
ANION GAP SERPL CALCULATED.3IONS-SCNC: 7 MMOL/L (ref 4–13)
AST SERPL W P-5'-P-CCNC: 20 U/L (ref 5–45)
BASOPHILS # BLD AUTO: 0 THOUSANDS/ΜL (ref 0–0.1)
BASOPHILS NFR BLD AUTO: 0 % (ref 0–1)
BILIRUB SERPL-MCNC: 0.5 MG/DL (ref 0.2–1)
BUN SERPL-MCNC: 14 MG/DL (ref 5–25)
CALCIUM SERPL-MCNC: 9 MG/DL (ref 8.3–10.1)
CHLORIDE SERPL-SCNC: 108 MMOL/L (ref 100–108)
CO2 SERPL-SCNC: 29 MMOL/L (ref 21–32)
CREAT SERPL-MCNC: 0.72 MG/DL (ref 0.6–1.3)
EOSINOPHIL # BLD AUTO: 0.1 THOUSAND/ΜL (ref 0–0.61)
EOSINOPHIL NFR BLD AUTO: 2 % (ref 0–6)
ERYTHROCYTE [DISTWIDTH] IN BLOOD BY AUTOMATED COUNT: 14.3 % (ref 11.6–15.1)
GFR SERPL CREATININE-BSD FRML MDRD: 91 ML/MIN/1.73SQ M
GLUCOSE SERPL-MCNC: 98 MG/DL (ref 65–140)
HCT VFR BLD AUTO: 42.6 % (ref 37–47)
HGB BLD-MCNC: 14.2 G/DL (ref 12–16)
LYMPHOCYTES # BLD AUTO: 1.5 THOUSANDS/ΜL (ref 0.6–4.47)
LYMPHOCYTES NFR BLD AUTO: 25 % (ref 14–44)
MAGNESIUM SERPL-MCNC: 2.1 MG/DL (ref 1.6–2.6)
MCH RBC QN AUTO: 28.6 PG (ref 27–31)
MCHC RBC AUTO-ENTMCNC: 33.3 G/DL (ref 31.4–37.4)
MCV RBC AUTO: 86 FL (ref 82–98)
MONOCYTES # BLD AUTO: 0.5 THOUSAND/ΜL (ref 0.17–1.22)
MONOCYTES NFR BLD AUTO: 8 % (ref 4–12)
MRSA NOSE QL CULT: NORMAL
NEUTROPHILS # BLD AUTO: 3.7 THOUSANDS/ΜL (ref 1.85–7.62)
NEUTS SEG NFR BLD AUTO: 65 % (ref 43–75)
NRBC BLD AUTO-RTO: 0 /100 WBCS
PLATELET # BLD AUTO: 188 THOUSANDS/UL (ref 130–400)
PMV BLD AUTO: 9.2 FL (ref 8.9–12.7)
POTASSIUM SERPL-SCNC: 4.3 MMOL/L (ref 3.5–5.3)
PROT SERPL-MCNC: 6.7 G/DL (ref 6.4–8.2)
RBC # BLD AUTO: 4.96 MILLION/UL (ref 4.2–5.4)
SODIUM SERPL-SCNC: 144 MMOL/L (ref 136–145)
WBC # BLD AUTO: 5.8 THOUSAND/UL (ref 4.8–10.8)

## 2018-03-05 PROCEDURE — 97110 THERAPEUTIC EXERCISES: CPT

## 2018-03-05 PROCEDURE — 97116 GAIT TRAINING THERAPY: CPT

## 2018-03-05 PROCEDURE — 92526 ORAL FUNCTION THERAPY: CPT

## 2018-03-05 PROCEDURE — 97112 NEUROMUSCULAR REEDUCATION: CPT

## 2018-03-05 PROCEDURE — 80053 COMPREHEN METABOLIC PANEL: CPT | Performed by: INTERNAL MEDICINE

## 2018-03-05 PROCEDURE — 85025 COMPLETE CBC W/AUTO DIFF WBC: CPT | Performed by: INTERNAL MEDICINE

## 2018-03-05 PROCEDURE — 93306 TTE W/DOPPLER COMPLETE: CPT

## 2018-03-05 PROCEDURE — 93880 EXTRACRANIAL BILAT STUDY: CPT | Performed by: SURGERY

## 2018-03-05 PROCEDURE — 83735 ASSAY OF MAGNESIUM: CPT | Performed by: INTERNAL MEDICINE

## 2018-03-05 PROCEDURE — A9585 GADOBUTROL INJECTION: HCPCS | Performed by: PSYCHIATRY & NEUROLOGY

## 2018-03-05 PROCEDURE — 99232 SBSQ HOSP IP/OBS MODERATE 35: CPT | Performed by: INTERNAL MEDICINE

## 2018-03-05 PROCEDURE — 99233 SBSQ HOSP IP/OBS HIGH 50: CPT | Performed by: PSYCHIATRY & NEUROLOGY

## 2018-03-05 PROCEDURE — 93880 EXTRACRANIAL BILAT STUDY: CPT

## 2018-03-05 PROCEDURE — 70553 MRI BRAIN STEM W/O & W/DYE: CPT

## 2018-03-05 PROCEDURE — 90686 IIV4 VACC NO PRSV 0.5 ML IM: CPT | Performed by: ANESTHESIOLOGY

## 2018-03-05 RX ORDER — HEPARIN SODIUM 5000 [USP'U]/ML
5000 INJECTION, SOLUTION INTRAVENOUS; SUBCUTANEOUS EVERY 8 HOURS SCHEDULED
Status: DISCONTINUED | OUTPATIENT
Start: 2018-03-06 | End: 2018-03-07 | Stop reason: HOSPADM

## 2018-03-05 RX ORDER — ASPIRIN 81 MG/1
81 TABLET ORAL DAILY
Status: DISCONTINUED | OUTPATIENT
Start: 2018-03-06 | End: 2018-03-07 | Stop reason: HOSPADM

## 2018-03-05 RX ORDER — PANTOPRAZOLE SODIUM 40 MG/1
40 TABLET, DELAYED RELEASE ORAL
Status: DISCONTINUED | OUTPATIENT
Start: 2018-03-06 | End: 2018-03-07 | Stop reason: HOSPADM

## 2018-03-05 RX ADMIN — GABAPENTIN 100 MG: 100 CAPSULE ORAL at 16:36

## 2018-03-05 RX ADMIN — ACETAMINOPHEN 650 MG: 325 TABLET, FILM COATED ORAL at 04:54

## 2018-03-05 RX ADMIN — GABAPENTIN 100 MG: 100 CAPSULE ORAL at 20:19

## 2018-03-05 RX ADMIN — GADOBUTROL 7 ML: 604.72 INJECTION INTRAVENOUS at 08:25

## 2018-03-05 RX ADMIN — INFLUENZA VIRUS VACCINE 0.5 ML: 15; 15; 15; 15 SUSPENSION INTRAMUSCULAR at 16:37

## 2018-03-05 RX ADMIN — ATORVASTATIN CALCIUM 40 MG: 40 TABLET, FILM COATED ORAL at 18:40

## 2018-03-05 RX ADMIN — ASPIRIN 325 MG: 325 TABLET, DELAYED RELEASE ORAL at 11:22

## 2018-03-05 RX ADMIN — GABAPENTIN 100 MG: 100 CAPSULE ORAL at 11:22

## 2018-03-05 RX ADMIN — POLYETHYLENE GLYCOL 3350 17 G: 17 POWDER, FOR SOLUTION ORAL at 11:23

## 2018-03-05 NOTE — PLAN OF CARE
Problem: DISCHARGE PLANNING - CARE MANAGEMENT  Goal: Discharge to post-acute care or home with appropriate resources  INTERVENTIONS:  - Conduct assessment to determine patient/family and health care team treatment goals, and need for post-acute services based on payer coverage, community resources, and patient preferences, and barriers to discharge  - Address psychosocial, clinical, and financial barriers to discharge as identified in assessment in conjunction with the patient/family and health care team  - Arrange appropriate level of post-acute services according to patient's   needs and preference and payer coverage in collaboration with the physician and health care team  - Communicate with and update the patient/family, physician, and health care team regarding progress on the discharge plan  - Arrange appropriate transportation to post-acute venues  Outcome: Adequate for Discharge  Plan:  Discharge to acute rehab vs subacute

## 2018-03-05 NOTE — PROGRESS NOTES
[address] Internal Medicine Progress Note  Patient: Brandon Matos 65 y o  female   MRN: [id number]  PCP: Zoë Pinon MD  Unit/Bed#: [address] [place] [address] Encounter: [id number]  Date Of Visit: 03/05/18    Problem List:    Principal Problem:    Acute ischemic stroke [facility])  Active Problems:    Depression    Irritable bowel syndrome    Osteopenia    Radiculopathy of lumbar region      Assessment & Plan:    * Focal neurological deficit   Assessment & Plan    Left facial numbness/tingling, left sided weakness  Status post tPA  Fluctuating neurological symptoms  Possible psychological/conversion syndrome; less likely RIND  MRI negative for any acute abnormalities  Hemoglobin A1c 6, LDL 84  Follow-up pending echo and carotid Doppler  Monitor on telemetry, no events so far  Aspirin 81 mg daily  Lipitor 40 mg  PT/OT/ST  Supportive care  Follow up with Neurology, Input appreciated  Consider psychiatric evaluation        Radiculopathy of lumbar region   Assessment & Plan    On gabapentin        Esophagitis   Assessment & Plan    Resume PPI              VTE Pharmacologic Prophylaxis:   Pharmacologic: Heparin  Mechanical VTE Prophylaxis in Place: Yes    Patient Centered Rounds: I have performed bedside rounds with nursing staff today  Discussions with Specialists or Other Care Team Provider: Yes , Dr Estee Galeana    Education and Discussions with Family / Patient:Yes, discussed with  at bedside    Time Spent for Care: 45 minutes  More than 50% of total time spent on counseling and coordination of care as described above      Current Length of Stay: 2 day(s)    Current Patient Status: Inpatient     Discharge Plan:  Anticipate acute or subacute rehab    Code Status: Level 1 - Full Code    Certification Statement: The patient will continue to require additional inpatient hospital stay due to Continue monitoring of the neurological symptoms      Subjective:   Reports increasing tingling numbness of the left side of the face  Left arm and leg strength is better  Denies chest pain, headache, dizziness or palpitation  ED and ICU course reviewed  Patient denies any recent stress or acute illness        Objective:   Comfortable      Negative for Chest Pain, Palpitations, Shortness of Breath, Abdominal Pain, Nausea, Vomiting, Constipation, Diarrhea, Dizziness  All other 10 review of systems negative and without drastic interval changes from yesterday  Vitals:   Temp (24hrs), Av 9 °F (36 6 °C), Min:97 4 °F (36 3 °C), Max:98 6 °F (37 °C)    HR:  [62-79] 77  Resp:  [18-22] 18  BP: (119-152)/(68-72) 152/72  SpO2:  [95 %-100 %] 95 %  Body mass index is 26 41 kg/m²  Input and Output Summary (last 24 hours): Intake/Output Summary (Last 24 hours) at 18 1434  Last data filed at 18 1100   Gross per 24 hour   Intake                0 ml   Output              900 ml   Net             -900 ml       Physical Exam:     Physical Exam   Constitutional: She is oriented to person, place, and time  She appears well-developed  No distress  HENT:   Head: Normocephalic and atraumatic  Nose: Nose normal    Eyes: Conjunctivae and EOM are normal  Pupils are equal, round, and reactive to light  Neck: Normal range of motion  Neck supple  No JVD present  Cardiovascular: Normal rate, regular rhythm and normal heart sounds  Exam reveals no gallop and no friction rub  No murmur heard  Pulmonary/Chest: Effort normal and breath sounds normal  No respiratory distress  She has no wheezes  She has no rales  She exhibits no tenderness  Abdominal: Soft  Bowel sounds are normal  She exhibits no distension  There is no tenderness  There is no rebound and no guarding  Musculoskeletal: She exhibits no edema  Neurological: She is alert and oriented to person, place, and time  She is not disoriented  A cranial nerve deficit (Left-sided decreased sensation) is present     Right upper and lower extremity 5/5  Left upper and lower extremity 4/5 Skin: Skin is warm and dry  No rash noted  Psychiatric: Her affect is blunt  Cognition and memory are normal        Additional Data:     Labs:      Results from last 7 days  Lab Units 03/05/18  0513   WBC Thousand/uL 5 80   HEMOGLOBIN g/dL 14 2   HEMATOCRIT % 42 6   PLATELETS Thousands/uL 188   NEUTROS PCT % 65   LYMPHS PCT % 25   MONOS PCT % 8   EOS PCT % 2       Results from last 7 days  Lab Units 03/05/18  0513   SODIUM mmol/L 144   POTASSIUM mmol/L 4 3   CHLORIDE mmol/L 108   CO2 mmol/L 29   BUN mg/dL 14   CREATININE mg/dL 0 72   CALCIUM mg/dL 9 0   TOTAL PROTEIN g/dL 6 7   BILIRUBIN TOTAL mg/dL 0 50   ALK PHOS U/L 50   ALT U/L 27   AST U/L 20   GLUCOSE RANDOM mg/dL 98       Results from last 7 days  Lab Units 03/03/18  0952   INR  0 98       * I Have Reviewed All Lab Data Listed Above  * Additional Pertinent Lab Tests Reviewed: All [place] Admission Reviewed    Imaging:  Xr Knee 3 Vw Left Non Injury    Result Date: 2/6/2018  Narrative: LEFT KNEE INDICATION:  Left knee pain  COMPARISON: None VIEWS:  3 IMAGES:  3 FINDINGS: There is no acute fracture or dislocation  There is no joint effusion  No degenerative changes  No lytic or blastic lesions are seen  Soft tissues are unremarkable  Impression: No acute osseous abnormality  Workstation performed: [id number]     Ct Head Wo Contrast    Result Date: 3/4/2018  Narrative: CT BRAIN - WITHOUT CONTRAST INDICATION:  Follow-up evaluation  Stroke COMPARISON:  3/3/2018 TECHNIQUE:  CT examination of the brain was performed  In addition to axial images, coronal 2D reformatted images were created and submitted for interpretation  Radiation dose length product (DLP) for this visit:  1189 44 mGy-cm   This examination, like all CT scans performed in the [facility], was performed utilizing techniques to minimize radiation dose exposure, including the use of iterative reconstruction and automated exposure control    IMAGE QUALITY: Diagnostic  FINDINGS: PARENCHYMA:  No intracranial mass, mass effect or midline shift  No CT signs of acute infarction  No acute intracranial hemorrhage  VENTRICLES AND EXTRA-AXIAL SPACES:  Normal for the patient's age  VISUALIZED ORBITS AND PARANASAL SINUSES:  Small retention cyst in left maxillary sinus  CALVARIUM AND EXTRACRANIAL SOFT TISSUES:  Normal      Impression: No acute intracranial abnormality  No large evolving territorial infarction  Workstation performed: [id number]     Ct Head Without Contrast    Result Date: 3/3/2018  Narrative: CT BRAIN - WITHOUT CONTRAST INDICATION:  Right sided visual disturbance  Status post TPA  Stroke COMPARISON:  3/3/2018 TECHNIQUE:  CT examination of the brain was performed  In addition to axial images, coronal 2D reformatted images were created and submitted for interpretation  Radiation dose length product (DLP) for this visit:  1161 52 mGy-cm   This examination, like all CT scans performed in the [facility], was performed utilizing techniques to minimize radiation dose exposure, including the use of iterative reconstruction and automated exposure control  IMAGE QUALITY:  Diagnostic  FINDINGS: PARENCHYMA:  No intracranial mass, mass effect or midline shift  No CT signs of acute infarction  No acute intracranial hemorrhage  VENTRICLES AND EXTRA-AXIAL SPACES:  Normal for the patient's age  VISUALIZED ORBITS AND PARANASAL SINUSES:  Left maxillary sinus retention cyst noted  CALVARIUM AND EXTRACRANIAL SOFT TISSUES:  Normal      Impression: No acute intracranial abnormality  Workstation performed: [id number]     Mri Brain W Wo Contrast    Result Date: 3/5/2018  Narrative: MRI BRAIN WITH AND WITHOUT CONTRAST INDICATION:  Leg weakness and left hand weakness  COMPARISON:  MRI brain February 23, 2006  Head CT March 4, 2018 TECHNIQUE: Sagittal T1, axial T2, axial FLAIR, axial T1, axial [place], axial diffusion   Sagittal and axial T1 postcontrast   Axial BRAVO post contrast with coronal reformat  IV Contrast:  7 mL of Gadobutrol injection (SINGLE-DOSE)  IMAGE QUALITY:   Diagnostic  FINDINGS: BRAIN PARENCHYMA:  There is no discrete mass, mass effect or midline shift  If he nonspecific white matter foci are seen which are within the realm of normal for patients in this age group    Brainstem and cerebellum demonstrate normal signal  There is no intracranial hemorrhage  There is no evidence of acute infarction and diffusion imaging is unremarkable  Postcontrast imaging of the brain demonstrates no abnormal enhancement  VENTRICLES:  Normal  SELLA AND PITUITARY GLAND:  Normal  ORBITS:  Normal  PARANASAL SINUSES:  Mucous retention cyst left maxillary sinus  VASCULATURE:  Evaluation of the major intracranial vasculature demonstrates appropriate flow voids  CALVARIUM AND SKULL BASE:  Normal  EXTRACRANIAL SOFT TISSUES:  Normal      Impression: Normal MRI of the brain  Workstation performed: [id number]     Xr Stroke Alert Portable Chest    Result Date: 3/3/2018  Narrative: CHEST INDICATION: Stroke alert  COMPARISON:  5/24/2017  EXAM PERFORMED/VIEWS:  XR STROKE ALERT PORTABLE CHEST FINDINGS: Cardiomediastinal silhouette appears unremarkable  The lungs are clear  No pneumothorax or pleural effusion  Osseous structures appear within normal limits for patient age  Impression: No acute cardiopulmonary disease  Workstation performed: [id number]     Ct Stroke Alert Brain    Result Date: 3/3/2018  Narrative: CT BRAIN - STROKE ALERT PROTOCOL INDICATION:  Left sided weakness and facial numbness  COMPARISON:  CT head 1/22/2016 TECHNIQUE:  CT examination of the brain was performed  In addition to axial images, coronal reformatted images were created and submitted for interpretation  Radiation dose length product (DLP) for this visit:  1162 17 mGy-cm     This examination, like all CT scans performed in the [facility], was performed utilizing techniques to minimize radiation dose exposure, including the use of iterative reconstruction and automated exposure control  IMAGE QUALITY:  Diagnostic  FINDINGS:  PARENCHYMA:  No intracranial mass, mass effect or midline shift  No acute intracranial hemorrhage  No CT signs of acute infarction  Mild cerebral atrophy  VENTRICLES AND EXTRA-AXIAL SPACES:  Normal for patient's age  VISUALIZED ORBITS AND PARANASAL SINUSES:  Mild polypoid mucosal thickening anterior bilateral maxillary sinuses  No fluid levels  The mastoid air cells are clear  CALVARIUM AND EXTRACRANIAL SOFT TISSUES:   Normal      Impression: No intracranial hemorrhage or acute large vessel territorial infarct detected  Findings were directly discussed with Louis Carranza on 3/3/2018 9:53 AM  Workstation performed: [id number]     Ct Follow Up    Result Date: 3/3/2018  Narrative: Per tech Notes in the EHR: CVA, EXAM WAS NOT PERFORMED DUE TO LIMITED TO NO IV ACCESS  MULTIPLE ATTEMPTS WERE MADE FOR IV ACCESS  ALL ATTEMPTS  WERE BLOWN OR INFILTRATED   PT WAS GIVEN TPA PRIOR TO CTA, AS PER ER AND ICU STAFF Workstation performed: [id number]     Imaging Reports Reviewed by myself    Cultures:   Blood Culture: No results found for: BLOODCX  Urine Culture: No results found for: URINECX  Sputum Culture: No components found for: SPUTUMCX  Wound Culture: No results found for: WOUNDCULT    Last 24 Hours Medication List:     Current Facility-Administered Medications:  acetaminophen 650 mg Oral Q6H PRN Sd Sos, CRNP   [START ON 3/6/2018] aspirin 81 mg Oral Daily Elder Dai MD   atorvastatin 40 mg Oral QPM Sd Sos, CRNP   gabapentin 100 mg Oral TID Sd Sos, CRNP   [START ON 3/6/2018] heparin (porcine) 5,000 Units Subcutaneous Q8H [address] Elder Dai MD   [START ON 3/6/2018] pantoprazole 40 mg Oral Early Morning Elder Dai MD   polyethylene glycol 17 g Oral Daily Sd Sos, CRNP        Today, Patient Was Seen By: Elder Dai MD    ** Please Note: Dictation voice to text software may have been used in the creation of this document   **

## 2018-03-05 NOTE — OCCUPATIONAL THERAPY NOTE
OT TREATMENT       03/05/18 1426   Pain Assessment   Pain Assessment No/denies pain   Pain Score No Pain   Coordination   Fine Motor (dexterity limitations due to weakness on left side)   Dexterity (impaired due to left sided weakness)   Cognition   Overall Cognitive Status [facility]   Arousal/Participation Alert; Responsive; Cooperative   Attention Within functional limits   Orientation Level Oriented X4   Memory Within functional limits   Following Commands Follows all commands and directions without difficulty   Assessment   Assessment Pt received in bed  Participated in some NTD for LUE to address reaching and crossing midline on effected left side  Encouraged pt to use left side for tasks as often as she thinks about it in order to stimulate and encourage return  SPoke with pt about providing tactile stimulation, and ranging left side to encourage funciotnal return in order to participate more fully in self care and mobility tasks  Pt demonstrates goo carryover for ROM exercises for fingers and hands   Plan   Treatment Interventions ADL retraining;Functional transfer training;UE strengthening/ROM; Patient/family training;Equipment evaluation/education; Neuromuscular reeducation; Fine motor coordination activities; Compensatory technique education; Energy conservation   OT Frequency 3-5x/wk   Recommendation   OT Discharge Recommendation Short Term Rehab

## 2018-03-05 NOTE — PLAN OF CARE
DISCHARGE PLANNING     Discharge to home or other facility with appropriate resources Progressing        DISCHARGE PLANNING - CARE MANAGEMENT     Discharge to post-acute care or home with appropriate resources Progressing        GASTROINTESTINAL - ADULT     Maintains adequate nutritional intake Progressing        HEMATOLOGIC - ADULT     Maintains hematologic stability Progressing        INFECTION - ADULT     Absence or prevention of progression during hospitalization Progressing     Absence of fever/infection during neutropenic period Progressing        Knowledge Deficit     Patient/family/caregiver demonstrates understanding of disease process, treatment plan, medications, and discharge instructions Progressing        METABOLIC, FLUID AND ELECTROLYTES - ADULT     Electrolytes maintained within normal limits Progressing     Fluid balance maintained Progressing        MUSCULOSKELETAL - ADULT     Maintain or return mobility to safest level of function Progressing        NEUROSENSORY - ADULT     Achieves stable or improved neurological status Progressing     Achieves maximal functionality and self care Progressing        Nutrition/Hydration-ADULT     Nutrient/Hydration intake appropriate for improving, restoring or maintaining nutritional needs Progressing        PAIN - ADULT     Verbalizes/displays adequate comfort level or baseline comfort level Progressing        Prexisting or High Potential for Compromised Skin Integrity     Skin integrity is maintained or improved Progressing        SAFETY ADULT     Patient will remain free of falls Progressing     Maintain or return to baseline ADL function Progressing     Maintain or return mobility status to optimal level Progressing        SKIN/TISSUE INTEGRITY - ADULT     Skin integrity remains intact Progressing     Oral mucous membranes remain intact Progressing

## 2018-03-05 NOTE — SOCIAL WORK
Met with pt  Resides with her  Blaze Medrano  Has no dme  No [facility]  Has been independent and driving  Is employed full-time  House is one floor with one step, then additional step to enter  Laundry is in the basement  Per OT recommendation was for short term rehab  Made initial referral to [facility], but will follow up with pt  Explained role of cm, will follow  CM reviewed d/c planning process including the following: identifying help at home, patient preference for d/c planning needs, and availability of the treatment team to discuss questions or concerns patient and/or family may have regarding understanding of medications and recognizing signs and symptoms once discharged  CM also encouraged patient to follow up with all recommended appointments after discharge  Patient advised of importance for patient and family to participate in managing patient's medical well being

## 2018-03-05 NOTE — PHYSICAL THERAPY NOTE
PT TREATMENT     03/05/18 1600   Pain Assessment   Pain Assessment No/denies pain   Restrictions/Precautions   Other Precautions Fall Risk  (L sided weakness)   General   Chart Reviewed Yes   Cognition   Overall Cognitive Status WFL   Subjective   Subjective "I haven't walked yet"   Bed Mobility   Supine to Sit 4  Minimal assistance   Transfers   Sit to Stand 4  Minimal assistance   Stand to Sit 4  Minimal assistance   Stand pivot 4  Minimal assistance   Additional items (with walker)   Ambulation/Elevation   Gait pattern Excessively slow; Short stride; Shuffling   Gait Assistance 4  Minimal assist   Assistive Device Rolling walker   Distance 2x 30 feet   Balance   Static Sitting Fair +   Dynamic Sitting Fair   Static Standing Fair   Dynamic Standing Fair -   Exercises   Heelslides 10 reps; Supine;Left;AAROM   Hip Flexion 10 reps; Supine;Left;AAROM  (SLR)   Hip Abduction 10 reps; Left;AAROM; Supine   Knee AROM Short Arc Quad 10 reps; Supine;Left;AAROM   Ankle Pumps Bilateral;AAROM;10 reps; Supine   Assessment   Prognosis Good   Problem List Decreased strength; Impaired balance;Decreased mobility; Decreased coordination   Assessment Pt demonstrates improved function today as pt was able to ambulate with min assist with very tenative/shuffling gait  manual cues needed to perfrom L LE exercise due to weakness and impaired proprioception  Overall ability to recruit L LE muscles improved with practice as did gait  Pt educated in performing AROM L LE often  Plan   Treatment/Interventions ADL retraining;Functional transfer training;LE strengthening/ROM; Therapeutic exercise; Endurance training;Gait training;Bed mobility; Patient/family training;Equipment eval/education;Spoke to nursing;Elevations   Progress Progressing toward goals   Recommendation   Recommendation (STR/acute?)   Equipment Recommended (rolling walker)

## 2018-03-05 NOTE — SPEECH THERAPY NOTE
Speech Language/Pathology    Speech/Language Pathology Progress Note    Patient Name: Duane Valencia  [facility]'S Date: 3/5/2018    Subjective:  Patient was seen upright in bed  Her  was present throughout session as well as Dr Earlene Maldonado  She reports increasing numbness/tingling feeling in her left face since this morning  Objective:  Patient consumed 1/2 grilled cheese sandwich and approx 4oz thin soda via straw sip  Adequate mastication noted- however slightly slower when attempting to masticate on the left side  She reports she is favoring her right as it is easier  Patient was instructed to continue to chew on both sides  She was noted to have very slight anterior spill of thin liquids x1 which she immediately wiped with napkin  Timely swallow and adequate HLE noted  No overt distress or s/s of aspiration observed  Assessment:  Patient's swallow function appears generally WFL at this time- min anterior spill of liquids may indicate numbness or loss of strength however repeat OME completed today which did not reveal this  Plan/Recommendations:  ST to follow up 1-2 more times to ensure diet tolerance

## 2018-03-05 NOTE — PROGRESS NOTES
Received the patient on 4N from the ICU after receiving report from [facility]  The patient is seen, in no distress offers no complaints  , will follow

## 2018-03-05 NOTE — CONSULTS
Neurology Consult Follow Up      Adali Tiwari is a 65 y o  female  [facility]    [id number]        Assessment/Recommendations:    1  Left sided weakness, s/p IV tPA at 10:49 on 3/3/18  2  Most probable conversion disorder   3  HTN  4  H/o anxiety disorder     Clinically patient is doing better but still has left leg weakness  Explained to patient and her  that MRI brain doesn't show any evidence of stroke  With this, most likely reason for her symptoms is psychological  RIND is still possible though unlikely  Cont aspirin 81mg for now upon discharge  Carotid doppler is pending   TTE w/ shunt- official report pending  Cont lipitor 40mg as inpatient  LDL is 84, she doesn't need statin upon discharge   Speech and swallow evaluation as needed  PT/OT  Discussed at length impression and plan with patient,  and primary team   Psych consult only if patient is agreeable  She has good understanding of etiology of her sxs now  Chief Complaint:  Left sided weakness   Subjective:   Patient is doing better but still has some left leg weakness  She says left facial tingling comes and goes  She says she doesn't thinks she has been in any stress  She doesn't feel anxious either  She thinks she's in better financial position than ever so is confused why she's manifesting this way  Past Medical History:   Diagnosis Date    Anesthesia complication     difficult to wake up    GERD (gastroesophageal reflux disease)     Osteopenia     with joint pain-elevated DEV    Tinnitus     Wears glasses     for reading     Social History     Social History    Marital status: /Civil Union     Spouse name: N/A    Number of children: N/A    Years of education: N/A     Occupational History    Not on file       Social History Main Topics    Smoking status: Never Smoker    Smokeless tobacco: Never Used    Alcohol use No    Drug use: No    Sexual activity: Not on file     Other Topics Concern    Not on file     Social History Narrative    No narrative on file     Family History   Problem Relation Age of Onset    Heart disease Mother     Stroke Mother 58    COPD Mother     Hypertension Father     Kidney disease Brother      kidney transplant       ROS:  Please see HPI for positive symptoms  No fever, no chills, no weight change  Ocular: No drainage, no blurred vision  HEENT:  No sore throat, earache, or congestion  No neck pain  COR:  No chest pain  No palpitations  Lungs:  no sob, wheezing,  GI:  no  nausea, no vomiting, no diarrhea, no constipation, no anorexia  :  No dysuria, frequency, or urgency  No hematuria  No vaginal discharge or vaginal bleeding  Musculoskeletal:  No joint pain or swelling or edema  Skin:  No rash or itching  Psychiatric:  no anxiety, no depression  Endocrine:  No polyuria or polydipsia        Objective:  /77 (BP Location: Left arm)   Pulse 95   Temp 98 7 °F (37 1 °C) (Tympanic)   Resp 18   Ht 5' 5" (1 651 m)   Wt 72 kg (158 lb 11 7 oz)   SpO2 96%   BMI 26 41 kg/m²     General: alert   Mental status: oriented x3  Attention: normal  Knowledge: fair  Language and Speech: normal  Cranial nerves: II-XII intact except left facial asymmetry and left facial decrease in sensation   Muscle tone: normal  Motor strength:  5/5 in right side, LUE: 4;5, LLE: 4/5  Sensory: grossly equal in b/l extremities   Gait: requires walker to stand and maneuver   Coordination: finger to nose and heel to toe normal  Reflexes: 2+ throughout  except as noted       Labs:      Lab Results   Component Value Date    WBC 5 80 03/05/2018    HGB 14 2 03/05/2018    HCT 42 6 03/05/2018    MCV 86 03/05/2018     03/05/2018     Lab Results   Component Value Date    HGBA1C 6 0 03/04/2018     Lab Results   Component Value Date    ALT 27 03/05/2018    AST 20 03/05/2018    ALKPHOS 50 03/05/2018    BILITOT 0 50 03/05/2018     Lab Results   Component Value Date    GLUCOSE 98 03/05/2018 CALCIUM 9 0 2018     2018    K 4 3 2018    CO2 29 2018     2018    BUN 14 2018    CREATININE 0 72 2018         Review of reports and notes reveal:     Ct Head Wo Contrast    Result Date: 3/4/2018  No acute intracranial abnormality  No large evolving territorial infarction  Workstation performed: [id number]     Ct Head Without Contrast    Result Date: 3/3/2018  No acute intracranial abnormality  Workstation performed: [id number]     Mri Brain W Wo Contrast    Result Date: 3/5/2018  Normal MRI of the brain  Workstation performed: [id number]     Xr Stroke Alert Portable Chest    Result Date: 3/3/2018  No acute cardiopulmonary disease  Workstation performed: [id number]     Ct Stroke Alert Brain    Result Date: 3/3/2018  No intracranial hemorrhage or acute large vessel territorial infarct detected  Findings were directly discussed with MARCUS PLUMMER on 3/3/2018 9:53 AM  Workstation performed: [id number]               Thank you for this consult      Total time of encounter:  40 min  More than 50% of the time was used in counseling and/or coordination of care  Extent of couseling and/or coordination of care        MD Yehuda Carranza [facility]  [address]  [place]   [phone]

## 2018-03-06 PROBLEM — M25.562 ACUTE PAIN OF LEFT KNEE: Status: RESOLVED | Noted: 2018-02-05 | Resolved: 2018-03-06

## 2018-03-06 PROBLEM — N30.00 ACUTE CYSTITIS WITHOUT HEMATURIA: Status: RESOLVED | Noted: 2018-02-05 | Resolved: 2018-03-06

## 2018-03-06 LAB
ANION GAP SERPL CALCULATED.3IONS-SCNC: 9 MMOL/L (ref 4–13)
BASOPHILS # BLD AUTO: 0 THOUSANDS/ΜL (ref 0–0.1)
BASOPHILS NFR BLD AUTO: 0 % (ref 0–1)
BUN SERPL-MCNC: 14 MG/DL (ref 5–25)
CALCIUM SERPL-MCNC: 9.2 MG/DL (ref 8.3–10.1)
CHLORIDE SERPL-SCNC: 106 MMOL/L (ref 100–108)
CO2 SERPL-SCNC: 26 MMOL/L (ref 21–32)
CREAT SERPL-MCNC: 0.72 MG/DL (ref 0.6–1.3)
EOSINOPHIL # BLD AUTO: 0.1 THOUSAND/ΜL (ref 0–0.61)
EOSINOPHIL NFR BLD AUTO: 1 % (ref 0–6)
ERYTHROCYTE [DISTWIDTH] IN BLOOD BY AUTOMATED COUNT: 14.1 % (ref 11.6–15.1)
GFR SERPL CREATININE-BSD FRML MDRD: 91 ML/MIN/1.73SQ M
GLUCOSE SERPL-MCNC: 107 MG/DL (ref 65–140)
HCT VFR BLD AUTO: 41 % (ref 37–47)
HGB BLD-MCNC: 13.5 G/DL (ref 12–16)
LYMPHOCYTES # BLD AUTO: 1.1 THOUSANDS/ΜL (ref 0.6–4.47)
LYMPHOCYTES NFR BLD AUTO: 18 % (ref 14–44)
MAGNESIUM SERPL-MCNC: 2.2 MG/DL (ref 1.6–2.6)
MCH RBC QN AUTO: 28.1 PG (ref 27–31)
MCHC RBC AUTO-ENTMCNC: 33.1 G/DL (ref 31.4–37.4)
MCV RBC AUTO: 85 FL (ref 82–98)
MONOCYTES # BLD AUTO: 0.5 THOUSAND/ΜL (ref 0.17–1.22)
MONOCYTES NFR BLD AUTO: 8 % (ref 4–12)
NEUTROPHILS # BLD AUTO: 4.7 THOUSANDS/ΜL (ref 1.85–7.62)
NEUTS SEG NFR BLD AUTO: 73 % (ref 43–75)
NRBC BLD AUTO-RTO: 0 /100 WBCS
PLATELET # BLD AUTO: 196 THOUSANDS/UL (ref 130–400)
PMV BLD AUTO: 8.9 FL (ref 8.9–12.7)
POTASSIUM SERPL-SCNC: 4.2 MMOL/L (ref 3.5–5.3)
RBC # BLD AUTO: 4.81 MILLION/UL (ref 4.2–5.4)
SODIUM SERPL-SCNC: 141 MMOL/L (ref 136–145)
VIT B12 SERPL-MCNC: 308 PG/ML (ref 100–900)
WBC # BLD AUTO: 6.4 THOUSAND/UL (ref 4.8–10.8)

## 2018-03-06 PROCEDURE — 83735 ASSAY OF MAGNESIUM: CPT | Performed by: INTERNAL MEDICINE

## 2018-03-06 PROCEDURE — 97110 THERAPEUTIC EXERCISES: CPT

## 2018-03-06 PROCEDURE — 85025 COMPLETE CBC W/AUTO DIFF WBC: CPT | Performed by: INTERNAL MEDICINE

## 2018-03-06 PROCEDURE — 97116 GAIT TRAINING THERAPY: CPT

## 2018-03-06 PROCEDURE — 99232 SBSQ HOSP IP/OBS MODERATE 35: CPT | Performed by: STUDENT IN AN ORGANIZED HEALTH CARE EDUCATION/TRAINING PROGRAM

## 2018-03-06 PROCEDURE — 97535 SELF CARE MNGMENT TRAINING: CPT

## 2018-03-06 PROCEDURE — 82607 VITAMIN B-12: CPT | Performed by: INTERNAL MEDICINE

## 2018-03-06 PROCEDURE — 93306 TTE W/DOPPLER COMPLETE: CPT | Performed by: INTERNAL MEDICINE

## 2018-03-06 PROCEDURE — 80048 BASIC METABOLIC PNL TOTAL CA: CPT | Performed by: INTERNAL MEDICINE

## 2018-03-06 RX ORDER — ONDANSETRON 2 MG/ML
4 INJECTION INTRAMUSCULAR; INTRAVENOUS EVERY 6 HOURS PRN
Status: DISCONTINUED | OUTPATIENT
Start: 2018-03-06 | End: 2018-03-07 | Stop reason: HOSPADM

## 2018-03-06 RX ORDER — ASPIRIN 81 MG/1
81 TABLET ORAL DAILY
Refills: 0
Start: 2018-03-07 | End: 2019-06-06 | Stop reason: ALTCHOICE

## 2018-03-06 RX ORDER — ATORVASTATIN CALCIUM 40 MG/1
40 TABLET, FILM COATED ORAL EVERY EVENING
Refills: 0
Start: 2018-03-06 | End: 2019-02-11

## 2018-03-06 RX ORDER — POLYETHYLENE GLYCOL 3350 17 G/17G
17 POWDER, FOR SOLUTION ORAL DAILY PRN
Qty: 14 EACH | Refills: 0
Start: 2018-03-06 | End: 2019-02-13

## 2018-03-06 RX ADMIN — HEPARIN SODIUM 5000 UNITS: 5000 INJECTION, SOLUTION INTRAVENOUS; SUBCUTANEOUS at 21:39

## 2018-03-06 RX ADMIN — ONDANSETRON 4 MG: 2 INJECTION INTRAMUSCULAR; INTRAVENOUS at 16:58

## 2018-03-06 RX ADMIN — HEPARIN SODIUM 5000 UNITS: 5000 INJECTION, SOLUTION INTRAVENOUS; SUBCUTANEOUS at 14:08

## 2018-03-06 RX ADMIN — HEPARIN SODIUM 5000 UNITS: 5000 INJECTION, SOLUTION INTRAVENOUS; SUBCUTANEOUS at 06:13

## 2018-03-06 RX ADMIN — ASPIRIN 81 MG: 81 TABLET, COATED ORAL at 09:04

## 2018-03-06 RX ADMIN — GABAPENTIN 100 MG: 100 CAPSULE ORAL at 17:37

## 2018-03-06 RX ADMIN — ACETAMINOPHEN 650 MG: 325 TABLET, FILM COATED ORAL at 06:12

## 2018-03-06 RX ADMIN — GABAPENTIN 100 MG: 100 CAPSULE ORAL at 09:04

## 2018-03-06 RX ADMIN — ATORVASTATIN CALCIUM 40 MG: 40 TABLET, FILM COATED ORAL at 17:37

## 2018-03-06 RX ADMIN — PANTOPRAZOLE SODIUM 40 MG: 40 TABLET, DELAYED RELEASE ORAL at 06:13

## 2018-03-06 NOTE — PLAN OF CARE
DISCHARGE PLANNING     Discharge to home or other facility with appropriate resources Progressing        DISCHARGE PLANNING - CARE MANAGEMENT     Discharge to post-acute care or home with appropriate resources Progressing        GASTROINTESTINAL - ADULT     Maintains adequate nutritional intake Progressing        HEMATOLOGIC - ADULT     Maintains hematologic stability Progressing        INFECTION - ADULT     Absence or prevention of progression during hospitalization Progressing     Absence of fever/infection during neutropenic period Progressing        Knowledge Deficit     Patient/family/caregiver demonstrates understanding of disease process, treatment plan, medications, and discharge instructions Progressing        METABOLIC, FLUID AND ELECTROLYTES - ADULT     Electrolytes maintained within normal limits Progressing     Fluid balance maintained Progressing        MUSCULOSKELETAL - ADULT     Maintain or return mobility to safest level of function Progressing        NEUROSENSORY - ADULT     Achieves stable or improved neurological status Progressing     Achieves maximal functionality and self care Progressing        Nutrition/Hydration-ADULT     Nutrient/Hydration intake appropriate for improving, restoring or maintaining nutritional needs Progressing        PAIN - ADULT     Verbalizes/displays adequate comfort level or baseline comfort level Progressing        Potential for Falls     Patient will remain free of falls Progressing        Prexisting or High Potential for Compromised Skin Integrity     Skin integrity is maintained or improved Progressing        SAFETY ADULT     Patient will remain free of falls Progressing     Maintain or return to baseline ADL function Progressing     Maintain or return mobility status to optimal level Progressing        SKIN/TISSUE INTEGRITY - ADULT     Skin integrity remains intact Progressing     Oral mucous membranes remain intact Progressing

## 2018-03-06 NOTE — ASSESSMENT & PLAN NOTE
Left facial numbness/tingling, left sided weakness  Status post tPA and 1 night in ICU for monitoring  During tpa administration patient had vision changes and a stat CT head showed no bleeding  Patient had atypical and Fluctuating neurological symptoms during her hospitalization  Seen by neurology   Sx thought Possible 2/2 psychological/conversion syndrome; less likely RIND  MRI negative for any acute stroke  Hemoglobin A1c 6, LDL 84  Echo and doppler unremarkable  Monitored on telemetry, with no events  Aspirin 81 mg daily  Lipitor 40 mg  PT/OT/ST recommends STR, patient was discharged there once bed was found  Follow up with Neurology as outpt  Patient does not want psych consult after discussion that symptoms may have a psych component

## 2018-03-06 NOTE — PHYSICAL THERAPY NOTE
PT TREATMENT     03/06/18 0835   Pain Assessment   Pain Assessment No/denies pain   Restrictions/Precautions   Other Precautions Fall Risk   General   Chart Reviewed Yes   Family/Caregiver Present No   Cognition   Overall Cognitive Status WFL   Arousal/Participation Cooperative   Orientation Level Oriented X4   Following Commands Follows all commands and directions without difficulty   Subjective   Subjective "I really have to think about it" (taking a step0   Bed Mobility   Supine to Sit 5  Supervision   Additional items Increased time required   Transfers   Sit to Stand 4  Minimal assistance   Additional items Assist x 1;Verbal cues   Stand to Sit 5  Supervision   Additional items Verbal cues   Ambulation/Elevation   Gait pattern (slow mitchel, decreased left knee/hip flexion in swing phase)   Gait Assistance 4  Minimal assist   Assistive Device Rolling walker   Distance 40 feet x 2 with brief standing rest    Balance   Ambulatory Fair   Activity Tolerance   Activity Tolerance Patient tolerated treatment well  (Increased time required to complete task)   Exercises   Hip Flexion Sitting;10 reps;Bilateral   Knee AROM Long Arc Quad Sitting;Right;AROM;10 reps;AAROM; Left  (first 10 on L were SAQs; 2nd 10 were AAROM/LAQs)   Ankle Pumps Supine;10 reps;Bilateral  (a second set of 10 were AAROM to achieve full ROM Left)   Assessment   Prognosis Good   Problem List Decreased endurance; Impaired balance;Decreased range of motion;Decreased mobility; Decreased coordination   Assessment Pt  is an excellent rehab candidate  Making steady improvements  Goals   Patient Goals To get back to work   Plan   Treatment/Interventions Functional transfer training; Therapeutic exercise; Endurance training;Patient/family training;Equipment eval/education; Bed mobility;Gait training;Spoke to nursing;OT   Progress Progressing toward goals   Recommendation   Recommendation (Acute rehab)   In chair at end of session, breakfast tray in front  Also pt   Given basin with warm water, face cloth, and personal toiletries to use after breakfast

## 2018-03-06 NOTE — OCCUPATIONAL THERAPY NOTE
OT TREATMENT       03/06/18 9418   Restrictions/Precautions   Other Precautions Chair Alarm; Bed Alarm; Fall Risk  (left sided weakness)   Pain Assessment   Pain Assessment 0-10   Pain Score 6   Pain Type Acute pain   Pain Location Foot  ("in the arch")   Pain Orientation Left   Hospital Pain Intervention(s) Repositioned; Ambulation/increased activity; Distraction  (notifeid nursing)   ADL   Where Assessed Standing at sink   Grooming Assistance 4  Minimal Assistance   Grooming Deficit Teeth care;Brushing hair;Wash/dry hands; Wash/dry face   LB Dressing Assistance 4  Minimal Assistance   LB Dressing Deficit Don/doff R sock; Don/doff L sock   Toileting Assistance  5  Supervision/Setup   Functional Standing Tolerance   Time 6 minutes   Activity grooming at sink in bathroom   Comments Emanuel   Transfers   Sit to Stand 4  Minimal assistance   Additional items Assist x 1;Verbal cues   Stand to Sit 4  Minimal assistance   Additional items Assist x 1;Verbal cues   Stand pivot 4  Minimal assistance   Additional items Assist x 1;Verbal cues; Increased time required   Functional Mobility   Functional Mobility 4  Minimal assistance   Additional Comments 25 feet x 2; slow pace, small steps   Additional items Rolling walker   Toilet Transfers   Toilet Transfer From (chair)   Toilet Transfer Type To and from   Toilet Transfer to Raised toilet seat with rails   Toilet Transfer Technique Ambulating   Toilet Transfers Minimal assistance;Verbal cues   Toilet Transfers Comments with RW   Therapeutic Exercise - ROM   UE-ROM Yes   ROM- Right Upper Extremities   R Shoulder Flexion;ABduction; Extension; External rotation; Internal rotation;AROM   R Elbow Elbow flexion;Elbow extension;AROM   R Wrist Wrist flexion;Wrist extension;AROM   R Hand Thumb; Index finger; Long finger;Ring finger;Little finger;AROM   R Position Seated   Equipment Dowel   R Weight/Reps/Sets 5 reps with 1 5#   ROM - Left Upper Extremities    L Shoulder AROM;Flexion;ABduction; Extension; External rotation; Internal rotation   L Elbow AROM;Elbow flexion;Elbow extension   L Wrist AROM; Wrist flexion;Wrist extension   L Hand AROM; Thumb; Index finger; Long finger;Ring finger;Little finger   L Position Seated   Equipment Dowel   L Weight/Reps/Sets 5 reps with 1 5#   LUE ROM Comment Education and training provided regarding HEP to increase BUE function  Cognition   Overall Cognitive Status Impaired   Arousal/Participation Alert; Cooperative   Attention Attends with cues to redirect   Orientation Level Oriented X4   Memory Within functional limits   Following Commands Follows multistep commands with increased time or repetition   Comments Pt reports difficulty concentrating and difficulty processing directions unless given increased time  Activity Tolerance   Activity Tolerance Patient limited by fatigue;Patient limited by pain   Medical Staff Simran Oconnor, RN   Assessment   Assessment Pt demonstrating improved strength, balance and increased functional activity tolerance allowing for increased active participation with ADL and exercises  Pt's movements are very slow and deliberate and she requires increased time to perform all ADL and mobility tasks  Pt states she is "still angry that this happened to her "  Education and emotional support provided regarding stroke symptoms and recovery process and goals of acute rehab, which appeared to lessen anxiety  Pt eager to return to PLOF  Cont OT per POC  Plan   Treatment Interventions ADL retraining;Functional transfer training;UE strengthening/ROM; Patient/family training;Equipment evaluation/education; Neuromuscular reeducation; Fine motor coordination activities; Compensatory technique education; Energy conservation   OT Frequency 3-5x/wk   Recommendation   OT Discharge Recommendation (acute rehab)     Patient left OOB in chair with all needs within reach

## 2018-03-06 NOTE — PLAN OF CARE
Problem: OCCUPATIONAL THERAPY ADULT  Goal: Performs self-care activities at highest level of function for planned discharge setting  See evaluation for individualized goals  Outcome: Progressing  Limitation: Decreased ADL status, Decreased UE ROM, Decreased UE strength, Decreased Safe judgement during ADL, Decreased endurance, Decreased high-level ADLs, Decreased self-care trans (decreased balance and mobility )  Prognosis: Good  Assessment: Pt demonstrating improved strength, balance and increased functional activity tolerance allowing for increased active participation with ADL and exercises  Pt's movements are very slow and deliberate and she requires increased time to perform all ADL and mobility tasks  Pt states she is "still angry that this happened to her "  Education and emotional support provided regarding stroke symptoms and recovery process and goals of acute rehab, which appeared to lessen anxiety  Pt eager to return to PLOF  Cont OT per POC        OT Discharge Recommendation:  (acute rehab)

## 2018-03-06 NOTE — PLAN OF CARE
Problem: PHYSICAL THERAPY ADULT  Goal: Performs mobility at highest level of function for planned discharge setting  See evaluation for individualized goals  Outcome: Progressing  Prognosis: Good  Problem List: Decreased endurance, Impaired balance, Decreased range of motion, Decreased mobility, Decreased coordination  Assessment: Pt  is an excellent rehab candidate  Making steady improvements  Recommendation:  (Acute rehab)          See flowsheet documentation for full assessment

## 2018-03-06 NOTE — SOCIAL WORK
Pt was agreeable to referral to [facility], but bed unavailable until Thursday or Friday, pt ready for d/c today  [facility] but they were unable to accept  Referral now made to [facility] [place] in [place]  Explained pt may not be d/c'd until tomorrow or the following day depending on the weather  Will follow

## 2018-03-07 VITALS
HEIGHT: 65 IN | SYSTOLIC BLOOD PRESSURE: 123 MMHG | RESPIRATION RATE: 20 BRPM | HEART RATE: 92 BPM | WEIGHT: 167.77 LBS | BODY MASS INDEX: 27.95 KG/M2 | DIASTOLIC BLOOD PRESSURE: 79 MMHG | OXYGEN SATURATION: 98 % | TEMPERATURE: 98.1 F

## 2018-03-07 LAB
ANION GAP SERPL CALCULATED.3IONS-SCNC: 7 MMOL/L (ref 4–13)
BASOPHILS # BLD AUTO: 0 THOUSANDS/ΜL (ref 0–0.1)
BASOPHILS NFR BLD AUTO: 0 % (ref 0–1)
BUN SERPL-MCNC: 19 MG/DL (ref 5–25)
CALCIUM SERPL-MCNC: 8.7 MG/DL (ref 8.3–10.1)
CHLORIDE SERPL-SCNC: 105 MMOL/L (ref 100–108)
CO2 SERPL-SCNC: 27 MMOL/L (ref 21–32)
CREAT SERPL-MCNC: 0.75 MG/DL (ref 0.6–1.3)
EOSINOPHIL # BLD AUTO: 0.1 THOUSAND/ΜL (ref 0–0.61)
EOSINOPHIL NFR BLD AUTO: 1 % (ref 0–6)
ERYTHROCYTE [DISTWIDTH] IN BLOOD BY AUTOMATED COUNT: 14 % (ref 11.6–15.1)
GFR SERPL CREATININE-BSD FRML MDRD: 86 ML/MIN/1.73SQ M
GLUCOSE SERPL-MCNC: 101 MG/DL (ref 65–140)
HCT VFR BLD AUTO: 39.2 % (ref 37–47)
HGB BLD-MCNC: 12.9 G/DL (ref 12–16)
LYMPHOCYTES # BLD AUTO: 1.2 THOUSANDS/ΜL (ref 0.6–4.47)
LYMPHOCYTES NFR BLD AUTO: 21 % (ref 14–44)
MAGNESIUM SERPL-MCNC: 2.1 MG/DL (ref 1.6–2.6)
MCH RBC QN AUTO: 28.1 PG (ref 27–31)
MCHC RBC AUTO-ENTMCNC: 33 G/DL (ref 31.4–37.4)
MCV RBC AUTO: 85 FL (ref 82–98)
MONOCYTES # BLD AUTO: 0.4 THOUSAND/ΜL (ref 0.17–1.22)
MONOCYTES NFR BLD AUTO: 7 % (ref 4–12)
NEUTROPHILS # BLD AUTO: 4.1 THOUSANDS/ΜL (ref 1.85–7.62)
NEUTS SEG NFR BLD AUTO: 71 % (ref 43–75)
NRBC BLD AUTO-RTO: 0 /100 WBCS
PLATELET # BLD AUTO: 164 THOUSANDS/UL (ref 130–400)
PMV BLD AUTO: 9.4 FL (ref 8.9–12.7)
POTASSIUM SERPL-SCNC: 4.2 MMOL/L (ref 3.5–5.3)
RBC # BLD AUTO: 4.61 MILLION/UL (ref 4.2–5.4)
SODIUM SERPL-SCNC: 139 MMOL/L (ref 136–145)
WBC # BLD AUTO: 5.8 THOUSAND/UL (ref 4.8–10.8)

## 2018-03-07 PROCEDURE — 97110 THERAPEUTIC EXERCISES: CPT

## 2018-03-07 PROCEDURE — 80048 BASIC METABOLIC PNL TOTAL CA: CPT | Performed by: INTERNAL MEDICINE

## 2018-03-07 PROCEDURE — 99239 HOSP IP/OBS DSCHRG MGMT >30: CPT | Performed by: STUDENT IN AN ORGANIZED HEALTH CARE EDUCATION/TRAINING PROGRAM

## 2018-03-07 PROCEDURE — 92526 ORAL FUNCTION THERAPY: CPT

## 2018-03-07 PROCEDURE — 83735 ASSAY OF MAGNESIUM: CPT | Performed by: INTERNAL MEDICINE

## 2018-03-07 PROCEDURE — 85025 COMPLETE CBC W/AUTO DIFF WBC: CPT | Performed by: INTERNAL MEDICINE

## 2018-03-07 RX ADMIN — HEPARIN SODIUM 5000 UNITS: 5000 INJECTION, SOLUTION INTRAVENOUS; SUBCUTANEOUS at 14:16

## 2018-03-07 RX ADMIN — HEPARIN SODIUM 5000 UNITS: 5000 INJECTION, SOLUTION INTRAVENOUS; SUBCUTANEOUS at 05:43

## 2018-03-07 RX ADMIN — PANTOPRAZOLE SODIUM 40 MG: 40 TABLET, DELAYED RELEASE ORAL at 05:43

## 2018-03-07 RX ADMIN — GABAPENTIN 100 MG: 100 CAPSULE ORAL at 09:55

## 2018-03-07 RX ADMIN — ASPIRIN 81 MG: 81 TABLET, COATED ORAL at 09:55

## 2018-03-07 RX ADMIN — POLYETHYLENE GLYCOL 3350 17 G: 17 POWDER, FOR SOLUTION ORAL at 09:55

## 2018-03-07 NOTE — CASE MANAGEMENT
Continued Stay Review    Date: 3/5/18    Vital Signs: /66 (BP Location: Right arm)   Pulse 67   Temp 97 7 °F (36 5 °C) (Tympanic)   Resp 18   Ht 5' 5" (1 651 m)   Wt 76 1 kg (167 lb 12 3 oz) Comment: patient cant stand  SpO2 93%   BMI 27 92 kg/m²       VIT B12 BMP   Medications:   Scheduled Meds:   Current Facility-Administered Medications:  acetaminophen 650 mg Oral Q6H PRN SHRAVAN Cortez   aspirin 81 mg Oral Daily Jolie Givens MD   atorvastatin 40 mg Oral QPM SHRAVAN Cortez   gabapentin 100 mg Oral TID SHRAVAN Cortez   heparin (porcine) 5,000 Units Subcutaneous Q8H [address] Jolie Givens MD   ondansetron 4 mg Intravenous Q6H PRN Memory Canavan, MD   pantoprazole 40 mg Oral Early Morning Jolie Givens MD   polyethylene glycol 17 g Oral Daily SHRAVAN Cortez     Continuous Infusions:    PRN Meds:   acetaminophen    ondansetron    Abnormal Labs/Diagnostic Results: ALB 3 2     Age/Sex: 65 y o  female          NEUROLOGY  Assessment/Recommendations:     1  Left sided weakness, s/p IV tPA at 10:49 on 3/3/18  2  Most probable conversion disorder   3  HTN  4  H/o anxiety disorder     Clinically patient is doing better but still has left leg weakness  Explained to patient and her  that MRI brain doesn't show any evidence of stroke  With this, most likely reason for her symptoms is psychological  RIND is still possible though unlikely  Cont aspirin 81mg for now upon discharge  Carotid doppler is pending   TTE w/ shunt- official report pending  Cont lipitor 40mg as inpatient  LDL is 84, she doesn't need statin upon discharge   Speech and swallow evaluation as needed  PT/OT  Discussed at length impression and plan with patient,  and primary team   Psych consult only if patient is agreeable   She has good understanding of etiology of her sxs now         ATTENDING  Assessment & Plan:         * Focal neurological deficit   Assessment & Plan     Left facial numbness/tingling, left sided weakness  Status post tPA  Fluctuating neurological symptoms  Possible psychological/conversion syndrome; less likely RIND  MRI negative for any acute abnormalities  Hemoglobin A1c 6, LDL 84  Follow-up pending echo and carotid Doppler  Monitor on telemetry, no events so far  Aspirin 81 mg daily  Lipitor 40 mg  PT/OT/ST  Supportive care  Follow up with Neurology, Input appreciated  Consider psychiatric evaluation          Radiculopathy of lumbar region   Assessment & Plan     On gabapentin          Esophagitis   Assessment & Plan     Resume PPI             VTE Pharmacologic Prophylaxis:   Pharmacologic: Heparin  Mechanical VTE Prophylaxis in Place:  Yes

## 2018-03-07 NOTE — SPEECH THERAPY NOTE
Speech Language/Pathology    Speech/Language Pathology Progress Note    Patient Name: Jennifer Dailey  [id number] Date: 3/7/2018    Subjective:  Patient appears generally better today- she reports feeling the same  Denies any further numbness/tingling feeling in her face  Report no further dysphagia  Objective:  Patient consumed thin liquids and fruit cocktail without any difficulty  Mastication, bolus formation and transfer adequate  Adequate initiation and HLE  No overt distress or s/s of aspiration observed  Informed patient of discharge from [facility] at this time and to ask for reconsult if her status changes  Reciprocal comprehension was verbally expressed  Assessment:  Patients oropharyngeal swallow appears to be generally WFL at this time  Plan/Recommendations:  Discharge from [facility] continue to diet of Regular textures and thin liquids

## 2018-03-07 NOTE — SOCIAL WORK
Discharge ordered  Pt will be transferred to [facility]  for subacute rehab  Per Care Manager pt requested wheelchair Usha jessica transport  [place] scheduled to transport as wheelchair Usha jessica  Unit, [facility] and pt aware of plan

## 2018-03-07 NOTE — PLAN OF CARE
Problem: DISCHARGE PLANNING - CARE MANAGEMENT  Goal: Discharge to post-acute care or home with appropriate resources  INTERVENTIONS:  - Conduct assessment to determine patient/family and health care team treatment goals, and need for post-acute services based on payer coverage, community resources, and patient preferences, and barriers to discharge  - Address psychosocial, clinical, and financial barriers to discharge as identified in assessment in conjunction with the patient/family and health care team  - Arrange appropriate level of post-acute services according to patient's   needs and preference and payer coverage in collaboration with the physician and health care team  - Communicate with and update the patient/family, physician, and health care team regarding progress on the discharge plan  - Arrange appropriate transportation to post-acute venues     3/7 [facility]  Outcome: Progressing  STR placement is planned  Pt has been accepted by [facility] and bed is available at [facility]  Authorization for 6 days subacute rehab ([id number]) has been received from [employer]  Pt can be transferred whenever ready

## 2018-03-07 NOTE — CASE MANAGEMENT
Continued Stay Review    Date: 3/6/17    Vital Signs: /66 (BP Location: Right arm)   Pulse 67   Temp 97 7 °F (36 5 °C) (Tympanic)   Resp 18   Ht 5' 5" (1 651 m)   Wt 76 1 kg (167 lb 12 3 oz) Comment: patient cant stand  SpO2 93%   BMI 27 92 kg/m²     Medications:   Scheduled Meds:   Current Facility-Administered Medications:  acetaminophen 650 mg Oral Q6H PRN OLGA MarquesNP   aspirin 81 mg Oral Daily Elder Dai MD   atorvastatin 40 mg Oral QPM Sd Sos, CRNP   gabapentin 100 mg Oral TID OLGA MarquesNP   heparin (porcine) 5,000 Units Subcutaneous Q8H [address] Elder Dai MD   ondansetron 4 mg Intravenous Q6H PRN Afshin Murphy MD   pantoprazole 40 mg Oral Early Morning Elder Dai MD   polyethylene glycol 17 g Oral Daily Sd SosOLGANP     Continuous Infusions:    PRN Meds:   acetaminophen    ondansetron    Abnormal Labs/Diagnostic Results:     Age/Sex: 65 y o  female     Assessment/Plan:   Assessment & Plan:         Radiculopathy of lumbar region   Assessment & Plan     On gabapentin          Esophagitis   Assessment & Plan     Resume PPI          * Focal neurological deficit   Assessment & Plan     Left facial numbness/tingling, left sided weakness  Status post tPA  Fluctuating neurological symptoms  Possible psychological/conversion syndrome; less likely RIND  MRI negative for any acute abnormalities  Hemoglobin A1c 6, LDL 84  Follow-up pending echo and carotid Doppler  Monitor on telemetry, no events so far  Aspirin 81 mg daily  Lipitor 40 mg  Supportive care  Follow up with Neurology, Input appreciated  Patient does not want psych consult   PT/OT/ST recommends STR, placement pending         VTE Pharmacologic Prophylaxis:   Pharmacologic: Heparin  Mechanical VTE Prophylaxis in Place: Yes    Discharge Plan: PATIENT AND FAMILY D/W FARIHA GUTIERREZ

## 2018-03-07 NOTE — [place] UNIVERSAL TRANSF[place] FORM
[place] UNIV[place] FOR[place]  (ALL ITEMS MUST BE COMPLETED)    1  TRANSFER FROM: [facility]    2  DATE OF TRANSFER: 3/7/2018                        TIME OF TRANSFER: 1436  3  PATIENT NAME: Aurelia Duarte,        YOB: 1955                             GENDER: female    4  LANGUAGE:   English    5  PHYSICIAN NAME:  Forest Kate MD                   PHONE: [phone]    6  CODE STATUS: Level 1 - Full Code        Out of Hospital DNR Attached:     7  :                                      :  Extended Emergency Contact Information  Primary Emergency Contact: [facility]  Address: [address] Phone: [phone]  Mobile Phone: [phone]  Relation: Spouse  Secondary Emergency Contact: Carleen Lombardo [facility]  Mobile Phone: [phone]  Relation: Daughter           Health Care Representative/Proxy:  No           Legal Guardian:               NAME OF:           HEALTH CARE REPRESENTATIVE/PROXY:                                         OR           LEGAL GUARDIAN, IF NOT :                                               PHONE:  (Day)           (Night)                        (Cell)    8  REASON FOR TRANSFER: Short Term Rehab , deconditioned  V/S: /66 (BP Location: Right arm)   Pulse 67   Temp 97 7 °F (36 5 °C) (Tympanic)   Resp 18   Ht 5' 5" (1 651 m)   Wt 76 1 kg (167 lb 12 3 oz) Comment: patient cant stand  SpO2 93%   BMI 27 92 kg/m²           PAIN: No    9  PRIMARY DIAGNOSIS: Focal neurological deficit      Secondary Diagnosis:         Pacemaker No     Internal Defib: no          Mental Health Diagnosis (if Applicable): Depression    10  RESTRAINTS: None    11  RESPIRATORY NEEDS: None    12  ISOLATION/PRECAUTION: None    13   ALLERGY: Demerol [meperidine] and Sulfa antibiotics    14  SENSORY:       Visual    15  SKIN CONDITION: intact    16  DIET: Cardiac    17  IV ACCESS: None    18  PERSONAL ITEMS SENT WITH PATIENT:  Glasses, Cell phone  19  ATTACHED DOCUMENTS: AVS sheet    20  AT RISK ALERTS:        HARM TO: Falls    21  WEIGHT BEARING STATUS:         Left Leg: Limited        Right Leg: Limited    22  MENTAL STATUS: Alert    23  FUNCTION:        Walk: with help        Transfer: with help        Toilet: with help        Feed: with help    24  IMMUNIZATIONS/SCREENING:     Immunization History   Administered Date(s) Administered    Influenza Quadrivalent Preservative Free 3 years and older IM 03/05/2018       25  BOWEL: 3/6/2018 continent    26  BLADDER: continent    27   SENDING FACILITY CONTACT: [address] [place] [address]           Title: RN        Unit: [address] [facility]        Phone: [phone]          REC'G FACILITY CONTACT : Dann Maria RN        Title:        Unit:         Phone:         FORM PREFILLED BY (if applicable)       Title:       Unit:        Phone:         FORM COMPLETED BY Shakira Lugo RN      Title: JIE      Phone: [phone]

## 2018-03-07 NOTE — SOCIAL WORK
SW following to assist with DCP  STR placement is planned  Pt has been accepted by [facility] and bed is available at [facility]  Authorization for 6 days subacute rehab ([id number]) has been received from [employer]  Notified Dr Julianna Delgadillo of plan and availability  Will assist with transport when discharge is ordered

## 2018-03-07 NOTE — PHYSICAL THERAPY NOTE
PT TREATMENT     03/07/18 1107   Pain Assessment   Pain Assessment No/denies pain   Restrictions/Precautions   Other Precautions Fall Risk   General   Chart Reviewed Yes   Cognition   Overall Cognitive Status WFL   Subjective   Subjective "I want to get better"   Transfers   Sit to Stand 4  Minimal assistance   Stand to Sit 4  Minimal assistance   Stand pivot 4  Minimal assistance   Toilet transfer 4  Minimal assistance   Ambulation/Elevation   Gait pattern Decreased foot clearance; Short stride; Inconsistent mitchel; Shuffling   Distance 2x 50 feet   Exercises   Hip Flexion AAROM;10 reps; Sitting;Left   Knee AROM Long Arc Quad [place]; Sitting   Ankle Pumps AAROM;10 reps; Sitting   Squat 5 reps;AAROM; Bilateral   Marching Bilateral;10 reps;Standing  (UE suupport on walker)   Assessment   Prognosis Good   Problem List Decreased strength;Decreased endurance; Impaired balance;Decreased mobility; Impaired sensation   Assessment Pt demonstrates improving functional mobilty and quality of gait daily  L LE remains weak  Encouraged pt to perform AAROM as able with L LE   Plan   Treatment/Interventions ADL retraining;Functional transfer training;LE strengthening/ROM; Therapeutic exercise; Endurance training;Patient/family training;Equipment eval/education; Bed mobility;Gait training   Progress Progressing toward goals   Recommendation   Recommendation (STR)

## 2018-03-07 NOTE — CASE MANAGEMENT
Notification of Discharge  This is a Notification of Discharge from our facility [address]  Please be advised that this patient has been discharge from our facility  Below you will find the admission and discharge date and time including the patients disposition  PRESENTATION DATE: 3/3/2018  9:41 AM  IP ADMISSION DATE: 3/3/18 1119  DISCHARGE DATE: No discharge date for patient encounter  DISPOSITION: Home/Self Care    [address] in the [facility] by Satish Abdalla for 2017  Network Utilization Review Department  Phone: [phone]; Fax [phone]  ATTENTION: The Network Utilization Review Department is now centralized for our 7 Facilities  Make a note that we have a new phone and fax numbers for our Department  Please call with any questions or concerns to [phone] and carefully follow the prompts so that you are directed to the right person  All voicemails are confidential  Fax any determinations, approvals, denials, and requests for initial or continue stay review clinical to [phone]  Due to HIGH CALL volume, it would be easier if you could please send faxed requests to expedite your requests and in part, help us provide discharge notifications faster

## 2018-03-07 NOTE — PROGRESS NOTES
[address] Internal Medicine Progress Note  Patient: Juan Luis Simon 65 y o  female   MRN: [id number]  PCP: Victoriano Domingo MD  Unit/Bed#: [address] [place] [address] Encounter: [id number]  Date Of Visit: 03/06/18    Problem List:    Principal Problem:    Focal neurological deficit  Active Problems:    Depression    Esophagitis    Irritable bowel syndrome    Osteopenia    Radiculopathy of lumbar region      Assessment & Plan:    Radiculopathy of lumbar region   Assessment & Plan    On gabapentin        Esophagitis   Assessment & Plan    Resume PPI        * Focal neurological deficit   Assessment & Plan    Left facial numbness/tingling, left sided weakness  Status post tPA  Fluctuating neurological symptoms  Possible psychological/conversion syndrome; less likely RIND  MRI negative for any acute abnormalities  Hemoglobin A1c 6, LDL 84  Follow-up pending echo and carotid Doppler  Monitor on telemetry, no events so far  Aspirin 81 mg daily  Lipitor 40 mg  PT/OT/ST recommends STR, placement pending  Supportive care  Follow up with Neurology, Input appreciated  Patient does not want psych consult              VTE Pharmacologic Prophylaxis:   Pharmacologic: Heparin  Mechanical VTE Prophylaxis in Place: Yes    Patient Centered Rounds: I have performed bedside rounds with nursing staff today  Discussions with Specialists or Other Care Team Provider: Yes , Dr Luiz Mota    Education and Discussions with Family / Patient:Yes, discussed with  at bedside    Time Spent for Care: 45 minutes  More than 50% of total time spent on counseling and coordination of care as described above  Current Length of Stay: 3 day(s)    Current Patient Status: Inpatient     Discharge Plan:  Acute rehab pending placement    Code Status: Level 1 - Full Code        Subjective:   Still has tingling in left side of the face but it is better    Left arm is normal strength now but the right leg feels like she has to actively think to make it moved  Was ambulating with physical therapy      Objective:   Comfortable      Negative for Chest Pain, Palpitations, Shortness of Breath, Abdominal Pain, Nausea, Vomiting, Constipation, Diarrhea, Dizziness  All other 10 review of systems negative and without drastic interval changes from yesterday  Vitals:   Temp (24hrs), Av 5 °F (36 4 °C), Min:97 1 °F (36 2 °C), Max:97 9 °F (36 6 °C)    HR:  [] 106  Resp:  [18] 18  BP: (114-129)/(65-73) 125/65  SpO2:  [94 %-97 %] 97 %  Body mass index is 26 3 kg/m²  Input and Output Summary (last 24 hours): Intake/Output Summary (Last 24 hours) at 18 1905  Last data filed at 18 1801   Gross per 24 hour   Intake                0 ml   Output              750 ml   Net             -750 ml       Physical Exam:     Physical Exam   Constitutional: She is oriented to person, place, and time  She appears well-developed  No distress  HENT:   Head: Normocephalic and atraumatic  Cardiovascular: Normal rate, regular rhythm and normal heart sounds  Pulmonary/Chest: Effort normal and breath sounds normal  No respiratory distress  She has no wheezes  She has no rales  Abdominal: Soft  Bowel sounds are normal  She exhibits no distension  There is no tenderness  There is no rebound  Neurological: She is alert and oriented to person, place, and time  A cranial nerve deficit (Left side of face decreased sensation) is present  BLE with 4/5 strength after some urging  BUE with 5/5 strength   Skin: Skin is warm and dry  Psychiatric: She has a normal mood and affect  Her behavior is normal    Nursing note and vitals reviewed        Additional Data:     Labs:      Results from last 7 days  Lab Units 18  0621   WBC Thousand/uL 6 40   HEMOGLOBIN g/dL 13 5   HEMATOCRIT % 41 0   PLATELETS Thousands/uL 196   NEUTROS PCT % 73   LYMPHS PCT % 18   MONOS PCT % 8   EOS PCT % 1       Results from last 7 days  Lab Units 18  0621 18  0513   SODIUM mmol/L 141 144   POTASSIUM mmol/L 4 2 4 3   CHLORIDE mmol/L 106 108   CO2 mmol/L 26 29   BUN mg/dL 14 14   CREATININE mg/dL 0 72 0 72   CALCIUM mg/dL 9 2 9 0   TOTAL PROTEIN g/dL  --  6 7   BILIRUBIN TOTAL mg/dL  --  0 50   ALK PHOS U/L  --  50   ALT U/L  --  27   AST U/L  --  20   GLUCOSE RANDOM mg/dL 107 98       Results from last 7 days  Lab Units 03/03/18  0952   INR  0 98       * I Have Reviewed All Lab Data Listed Above  * Additional Pertinent Lab Tests Reviewed: All [place] Admission Reviewed    Imaging:  Xr Knee 3 Vw Left Non Injury    Result Date: 2/6/2018  Narrative: LEFT KNEE INDICATION:  Left knee pain  COMPARISON: None VIEWS:  3 IMAGES:  3 FINDINGS: There is no acute fracture or dislocation  There is no joint effusion  No degenerative changes  No lytic or blastic lesions are seen  Soft tissues are unremarkable  Impression: No acute osseous abnormality  Workstation performed: [id number]     Ct Head Wo Contrast    Result Date: 3/4/2018  Narrative: CT BRAIN - WITHOUT CONTRAST INDICATION:  Follow-up evaluation  Stroke COMPARISON:  3/3/2018 TECHNIQUE:  CT examination of the brain was performed  In addition to axial images, coronal 2D reformatted images were created and submitted for interpretation  Radiation dose length product (DLP) for this visit:  1189 44 mGy-cm   This examination, like all CT scans performed in the [facility], was performed utilizing techniques to minimize radiation dose exposure, including the use of iterative reconstruction and automated exposure control  IMAGE QUALITY:  Diagnostic  FINDINGS: PARENCHYMA:  No intracranial mass, mass effect or midline shift  No CT signs of acute infarction  No acute intracranial hemorrhage  VENTRICLES AND EXTRA-AXIAL SPACES:  Normal for the patient's age  VISUALIZED ORBITS AND PARANASAL SINUSES:  Small retention cyst in left maxillary sinus   CALVARIUM AND EXTRACRANIAL SOFT TISSUES:  Normal      Impression: No acute intracranial abnormality  No large evolving territorial infarction  Workstation performed: [id number]     Ct Head Without Contrast    Result Date: 3/3/2018  Narrative: CT BRAIN - WITHOUT CONTRAST INDICATION:  Right sided visual disturbance  Status post TPA  Stroke COMPARISON:  3/3/2018 TECHNIQUE:  CT examination of the brain was performed  In addition to axial images, coronal 2D reformatted images were created and submitted for interpretation  Radiation dose length product (DLP) for this visit:  1161 52 mGy-cm   This examination, like all CT scans performed in the [facility], was performed utilizing techniques to minimize radiation dose exposure, including the use of iterative reconstruction and automated exposure control  IMAGE QUALITY:  Diagnostic  FINDINGS: PARENCHYMA:  No intracranial mass, mass effect or midline shift  No CT signs of acute infarction  No acute intracranial hemorrhage  VENTRICLES AND EXTRA-AXIAL SPACES:  Normal for the patient's age  VISUALIZED ORBITS AND PARANASAL SINUSES:  Left maxillary sinus retention cyst noted  CALVARIUM AND EXTRACRANIAL SOFT TISSUES:  Normal      Impression: No acute intracranial abnormality  Workstation performed: [id number]     Mri Brain W Wo Contrast    Result Date: 3/5/2018  Narrative: MRI BRAIN WITH AND WITHOUT CONTRAST INDICATION:  Leg weakness and left hand weakness  COMPARISON:  MRI brain February 23, 2006  Head CT March 4, 2018 TECHNIQUE: Sagittal T1, axial T2, axial FLAIR, axial T1, axial [place], axial diffusion  Sagittal and axial T1 postcontrast   Axial BRAVO post contrast with coronal reformat  IV Contrast:  7 mL of Gadobutrol injection (SINGLE-DOSE)  IMAGE QUALITY:   Diagnostic  FINDINGS: BRAIN PARENCHYMA:  There is no discrete mass, mass effect or midline shift  If he nonspecific white matter foci are seen which are within the realm of normal for patients in this age group    Brainstem and cerebellum demonstrate normal signal  There is no intracranial hemorrhage  There is no evidence of acute infarction and diffusion imaging is unremarkable  Postcontrast imaging of the brain demonstrates no abnormal enhancement  VENTRICLES:  Normal  SELLA AND PITUITARY GLAND:  Normal  ORBITS:  Normal  PARANASAL SINUSES:  Mucous retention cyst left maxillary sinus  VASCULATURE:  Evaluation of the major intracranial vasculature demonstrates appropriate flow voids  CALVARIUM AND SKULL BASE:  Normal  EXTRACRANIAL SOFT TISSUES:  Normal      Impression: Normal MRI of the brain  Workstation performed: [id number]     Xr Stroke Alert Portable Chest    Result Date: 3/3/2018  Narrative: CHEST INDICATION: Stroke alert  COMPARISON:  5/24/2017  EXAM PERFORMED/VIEWS:  XR STROKE ALERT PORTABLE CHEST FINDINGS: Cardiomediastinal silhouette appears unremarkable  The lungs are clear  No pneumothorax or pleural effusion  Osseous structures appear within normal limits for patient age  Impression: No acute cardiopulmonary disease  Workstation performed: [id number]     Ct Stroke Alert Brain    Result Date: 3/3/2018  Narrative: CT BRAIN - STROKE ALERT PROTOCOL INDICATION:  Left sided weakness and facial numbness  COMPARISON:  CT head 1/22/2016 TECHNIQUE:  CT examination of the brain was performed  In addition to axial images, coronal reformatted images were created and submitted for interpretation  Radiation dose length product (DLP) for this visit:  1162 17 mGy-cm   This examination, like all CT scans performed in the [facility], was performed utilizing techniques to minimize radiation dose exposure, including the use of iterative reconstruction and automated exposure control  IMAGE QUALITY:  Diagnostic  FINDINGS:  PARENCHYMA:  No intracranial mass, mass effect or midline shift  No acute intracranial hemorrhage  No CT signs of acute infarction  Mild cerebral atrophy  VENTRICLES AND EXTRA-AXIAL SPACES:  Normal for patient's age   VISUALIZED ORBITS AND PARANASAL SINUSES:  Mild polypoid mucosal thickening anterior bilateral maxillary sinuses  No fluid levels  The mastoid air cells are clear  CALVARIUM AND EXTRACRANIAL SOFT TISSUES:   Normal      Impression: No intracranial hemorrhage or acute large vessel territorial infarct detected  Findings were directly discussed with Heatehr Mcwilliams on 3/3/2018 9:53 AM  Workstation performed: [id number]     Ct Follow Up    Result Date: 3/3/2018  Narrative: Per tech Notes in the EHR: CVA, EXAM WAS NOT PERFORMED DUE TO LIMITED TO NO IV ACCESS  MULTIPLE ATTEMPTS WERE MADE FOR IV ACCESS  ALL ATTEMPTS  WERE BLOWN OR INFILTRATED  PT WAS GIVEN TPA PRIOR TO CTA, AS PER ER AND ICU STAFF Workstation performed: [id number]     Imaging Reports Reviewed by myself    Cultures:   Blood Culture: No results found for: BLOODCX  Urine Culture: No results found for: URINECX  Sputum Culture: No components found for: SPUTUMCX  Wound Culture: No results found for: WOUNDCULT    Last 24 Hours Medication List:     Current Facility-Administered Medications:  acetaminophen 650 mg Oral Q6H PRN Randle Pinta, CRNP   aspirin 81 mg Oral Daily Chester Goodpasture, MD   atorvastatin 40 mg Oral QPM Randle Pinta, CRNP   gabapentin 100 mg Oral TID Randle Pinta, CRNP   heparin (porcine) 5,000 Units Subcutaneous Q8H [address] Chester Goodpasture, MD   ondansetron 4 mg Intravenous Q6H PRN Dylan Vázquez MD   pantoprazole 40 mg Oral Early Morning Chester Goodpasture, MD   polyethylene glycol 17 g Oral Daily Randle Pinta, CRNP        Today, Patient Was Seen By: Dylan Vázquez MD    ** Please Note: Dictation voice to text software may have been used in the creation of this document   **

## 2018-03-07 NOTE — DISCHARGE SUMMARY
Discharge- Amanda Rhodes 1955, 65 y o  female MRN: [id number]    Unit/Bed#: [address] [address] Encounter: [id number]    Primary Care Provider: Freddy Corado MD   Date and time admitted to hospital: 3/3/2018  9:41 AM        * Focal neurological deficit   Assessment & Plan    Left facial numbness/tingling, left sided weakness  Status post tPA and 1 night in ICU for monitoring  During tpa administration patient had vision changes and a stat CT head showed no bleeding  Patient had atypical and Fluctuating neurological symptoms during her hospitalization  Seen by neurology   Sx thought Possible 2/2 psychological/conversion syndrome; less likely RIND  MRI negative for any acute stroke  Hemoglobin A1c 6, LDL 84  Echo and doppler unremarkable  Monitored on telemetry, with no events  Aspirin 81 mg daily  Lipitor 40 mg  PT/OT/ST recommends STR, patient was discharged there once bed was found  Follow up with Neurology as outpt  Patient does not want psych consult after discussion that symptoms may have a psych component  Radiculopathy of lumbar region   Assessment & Plan    On gabapentin        Esophagitis   Assessment & Plan    Resume PPI              Discharging Physician / Practitioner: Reinaldo Partida MD  PCP: Freddy Corado MD  Admission Date: 3/3/2018  Discharge Date: 03/07/18    Reason for Admission: Facial Numbness (Pt sts started this am with left sided facial numbness and feels weak on left leg  Pt tearful, sts has had left leg pain and foot pain  Also c/o bilateral hand tingling and sweating)        Resolved Problems  Date Reviewed: 3/3/2018    None          Consultations During Hospital Stay:  [facility] [place] TO CASE MANAGEMENT    Procedures Performed:     · tPA administration    Significant Findings / Test Results:     · 2D echo with bubble: EF 55-60%, technically difficult to rule out shunt but no obvious signs of it noted     · Cholesterol 158, triglycerides 75, HDL 59, LDL 84  · Vitamin B12 308  · Hemoglobin A1c 6 0  · UA negative for ketones, blood, nitrites, leukocytes    Ct Head Wo Contrast  Result Date: 3/4/2018  Impression: No acute intracranial abnormality  No large evolving territorial infarction  Workstation performed: [id number]     Ct Head Without Contrast  Result Date: 3/3/2018  Impression: No acute intracranial abnormality  Workstation performed: [id number]     Mri Brain W Wo Contrast  Result Date: 3/5/2018  Impression: Normal MRI of the brain  Workstation performed: [id number]     Xr Stroke Alert Portable Chest  Result Date: 3/3/2018  Impression: No acute cardiopulmonary disease  Workstation performed: [id number]     Ct Stroke Alert Brain  Result Date: 3/3/2018  Impression: No intracranial hemorrhage or acute large vessel territorial infarct detected  Findings were directly discussed with Sheila Nassar on 3/3/2018 9:53 AM  Workstation performed: [id number]     Ct Follow Up  Result Date: 3/3/2018  Vas Carotid Complete Study    Result Date: 3/5/2018  CONCLUSION:  Impression RIGHT: There is no evidence of significant stenosis noted  Vertebral artery flow is antegrade  There is no significant subclavian artery disease  LEFT: There is no evidence of significant stenosis noted  Vertebral artery flow is antegrade  There is no significant subclavian artery disease  Internal carotid artery stenosis determination by consensus criteria from: Florentino Liriano et al  Carotid Artery Stenosis: Gray-Scale and Doppler US Diagnosis - Society of Radiologists in [address] [place] [address], Radiology 2003; [phone]    SIGNATURE: Electronically Signed by: Issac Chawla MD, RPVI on 2018-03-05 08:24:35 PM        Incidental Findings:   ·      Test Results Pending at Discharge (will require follow up):   ·      Outpatient Tests Requested:  ·     Complications:  none    Reason for Admission: [address] Course:     Maya Roth is a 65 y o  female patient with a PMH of anxiety, fibromyalgia and Raynaud's  who originally presented to the hospital on 3/3/2018 due to c/o left facial numbness and left leg weakness, also c/o B/L hand tingling and sweating that started at 0830 this morning  She was taken to CT scan and not found to have any bleeding, so TPA was initiated after ED discussion Dr Stacey Younger of neurology  During the TPA infusion pt began c/o blurred vision in the right eye which progressed to a right visual field deficit  Emergent CT head was done and found to have no bleeding  Pt still c/o left weakness and right visual filed loss  Blurred vision is better  She was admitted to the ICU for care    Please see above list of diagnoses and related plan for additional information  Condition at Discharge: good     Discharge Day Visit / Exam:     Subjective:  Glenn Silva feels good, wants to start rehab  Still has some leg weakness, feels she has to actively think to get it to move  Was able to walk with PT  Vitals: Blood Pressure: 123/79 (03/07/18 0900)  Pulse: 92 (03/07/18 0900)  Temperature: 98 1 °F (36 7 °C) (03/07/18 0900)  Temp Source: Tympanic (03/07/18 0900)  Respirations: 20 (03/07/18 0900)  Height: 5' 5" (165 1 cm) (03/03/18 1400)  Weight - Scale: 76 1 kg (167 lb 12 3 oz) (patient cant stand) (03/07/18 0600)  SpO2: 98 % (03/07/18 0900)  Exam:   Physical Exam    Constitutional: She is oriented to person, place, and time  She appears well-developed  No distress  HENT:   Head: Normocephalic and atraumatic  Cardiovascular: Normal rate, regular rhythm and normal heart sounds  Pulmonary/Chest: Effort normal and breath sounds normal  No respiratory distress  She has no wheezes  She has no rales  Abdominal: Soft  Bowel sounds are normal  She exhibits no distension  There is no tenderness  There is no rebound  Neurological: She is alert and oriented to person, place, and time  A cranial nerve deficit (Left side of face decreased sensation) is present     BLE with 4/5 strength after some urging  BUE with 5/5 strength   Skin: Skin is warm and dry  Psychiatric: She has a normal mood and affect  Her behavior is normal    Nursing note and vitals reviewed  Discharge instructions/Information to patient and family:   See after visit summary for information provided to patient and family  Provisions for Follow-Up Care:  See after visit summary for information related to follow-up care and any pertinent home health orders  Disposition:     Acute Rehab at   Planned Readmission: no     Discharge Statement:  I spent >30 minutes discharging the patient  This time was spent on the day of discharge  I had direct contact with the patient on the day of discharge  Greater than 50% of the total time was spent examining patient, answering all patient questions, arranging and discussing plan of care with patient as well as directly providing post-discharge instructions  Additional time then spent on discharge activities  Discharge Medications:  See after visit summary for reconciled discharge medications provided to patient and family        ** Please Note: This note has been constructed using a voice recognition system **

## 2018-03-08 ENCOUNTER — TELEPHONE (OUTPATIENT)
Dept: FAMILY MEDICINE CLINIC | Facility: CLINIC | Age: 63
End: 2018-03-08

## 2018-03-08 ENCOUNTER — TRANSITIONAL CARE MANAGEMENT (OUTPATIENT)
Dept: FAMILY MEDICINE CLINIC | Facility: CLINIC | Age: 63
End: 2018-03-08

## 2018-03-16 ENCOUNTER — TRANSCRIBE ORDERS (OUTPATIENT)
Dept: ADMINISTRATIVE | Facility: HOSPITAL | Age: 63
End: 2018-03-16

## 2018-03-16 DIAGNOSIS — R53.1 ASTHENIA: Primary | ICD-10-CM

## 2018-03-26 ENCOUNTER — TELEPHONE (OUTPATIENT)
Dept: FAMILY MEDICINE CLINIC | Facility: CLINIC | Age: 63
End: 2018-03-26

## 2018-03-26 NOTE — TELEPHONE ENCOUNTER
She said she had diarrhea for 4 days, and it started to be a black color, and no other symptoms  When she peed today she did have a little more and it was still black

## 2018-03-26 NOTE — TELEPHONE ENCOUNTER
Spoke to pt states      has  Diarrhea  for 4 days, stool appears to be black   Pt states  Took peptobismo  Before stool appeared to be black  Denies any dizziness, chest pain, nausea, and or pain      pt states  Not having any symptoms  af/rma

## 2018-03-26 NOTE — TELEPHONE ENCOUNTER
[address] [address] now I see the whole message sounds like she has gastroenteritis  Fluids fluids fluids  [place] [employer] BRAT diet  Should clear on its own    Give warning signs for dehydration

## 2018-03-27 ENCOUNTER — OFFICE VISIT (OUTPATIENT)
Dept: FAMILY MEDICINE CLINIC | Facility: CLINIC | Age: 63
End: 2018-03-27
Payer: COMMERCIAL

## 2018-03-27 VITALS
TEMPERATURE: 96 F | WEIGHT: 162 LBS | SYSTOLIC BLOOD PRESSURE: 124 MMHG | HEART RATE: 84 BPM | HEIGHT: 65 IN | RESPIRATION RATE: 20 BRPM | DIASTOLIC BLOOD PRESSURE: 80 MMHG | BODY MASS INDEX: 26.99 KG/M2

## 2018-03-27 DIAGNOSIS — K20.90 ESOPHAGITIS: ICD-10-CM

## 2018-03-27 DIAGNOSIS — R29.818 FOCAL NEUROLOGICAL DEFICIT: Primary | ICD-10-CM

## 2018-03-27 DIAGNOSIS — M79.671 PAIN IN BOTH FEET: ICD-10-CM

## 2018-03-27 DIAGNOSIS — M12.9 ARTHROPATHY OF MULTIPLE SITES: ICD-10-CM

## 2018-03-27 DIAGNOSIS — M79.672 PAIN IN BOTH FEET: ICD-10-CM

## 2018-03-27 PROCEDURE — 99214 OFFICE O/P EST MOD 30 MIN: CPT | Performed by: NURSE PRACTITIONER

## 2018-03-27 PROCEDURE — 3008F BODY MASS INDEX DOCD: CPT | Performed by: NURSE PRACTITIONER

## 2018-03-27 RX ORDER — ACETAMINOPHEN AND CODEINE PHOSPHATE 300; 15 MG/1; MG/1
1 TABLET ORAL
Qty: 7 TABLET | Refills: 0 | Status: SHIPPED | OUTPATIENT
Start: 2018-03-27 | End: 2018-04-03

## 2018-03-27 RX ORDER — OMEPRAZOLE 40 MG/1
40 CAPSULE, DELAYED RELEASE ORAL DAILY
Refills: 0 | COMMUNITY
Start: 2018-03-24 | End: 2019-08-08

## 2018-03-27 RX ORDER — ACETAMINOPHEN AND CODEINE PHOSPHATE 300; 15 MG/1; MG/1
1 TABLET ORAL
Qty: 30 TABLET | Refills: 0 | Status: SHIPPED | OUTPATIENT
Start: 2018-03-27 | End: 2018-03-27 | Stop reason: CLARIF

## 2018-03-27 NOTE — PATIENT INSTRUCTIONS
Will take Tylenol # 2 at night time  Follow up with neurologist   Supportive care discussed and advised  Follow up for no improvement and worsening of conditions  Patient advised and educated when to see immediate medical care

## 2018-03-27 NOTE — PROGRESS NOTES
Assessment/Plan:  Will take Tylenol # 2 at night time  Follow up with neurologist   Supportive care discussed and advised  Follow up for no improvement and worsening of conditions  Patient advised and educated when to see immediate medical care  Diagnoses and all orders for this visit:    Focal neurological deficit  - MRI tomorrow  - Follow up with neurologist next week  - Continue with PT and OT   - Started on statin in hosp and continue until advised by neurologist      Arthropathy of multiple sites  -     acetaminophen-codeine (TYLENOL #2) 300-15 MG per tablet; Take 1 tablet by mouth daily at bedtime as needed for moderate pain for up to 7 days (Collaborative PHYS: Dr Tarik Jerez, License # [id number]) for short term use only and long term based on neurologist discretion  Esophagitis  - Continue with PPI  -     omeprazole (PriLOSEC) 40 MG capsule; Take 40 mg by mouth daily before breakfast    Pain in both feet  -     acetaminophen-codeine (TYLENOL #2) 300-15 MG per tablet; Take 1 tablet by mouth daily at bedtime as needed for moderate pain for up to 7 days (Collaborative PHYS: Dr Tarik Jerez, License # [id number]) for short term use only and long term based on neurologist discretion    - Continue with Lodine    BMI 26                Return if symptoms worsen or fail to improve  Subjective:      Patient ID: Caryn Man is a 65 y o  female  Chief Complaint   Patient presents with    Follow-up     TCM follow up [facility] LPN       HPI  Patient is here for follow up  Patient was in the hospital on 3/3 due to left sided weakness and CT scan was negative for intracranial bleed and was TPA given but MRI on 3/5 and was normal and negative for bleed and infarct  Patient going to see neurologist next week and having repeat MRI tomorrow    Patient currently having sharp burning pain in both feet mostly in the night time and right is worse more than left and not able to sleep  Patient stated that having occasional shooting pains in both arms but resolves in its own  Patient lost left eye peripheral vision and left hand grasp is slow and weak due to neurological event  Noticing some short term memory loss and forgetful  Takes Tyelnol PM for pain in both feet and not much relief  Will be starting PT and OT tomorrow  The following portions of the patient's history were reviewed and updated as appropriate: allergies, current medications, past family history, past medical history, past social history, past surgical history and problem list       Review of Systems   Constitutional: Positive for activity change  Negative for appetite change, chills, diaphoresis, fatigue, fever and unexpected weight change  Respiratory: Negative  Cardiovascular: Negative  Gastrointestinal: Negative  Genitourinary: Negative  Musculoskeletal: Positive for myalgias  Pain in both feet like burning and un tolerable at night time  Neurological: Positive for weakness (left hand grasp is slow and weak)  Negative for dizziness, tremors, seizures, syncope, facial asymmetry, speech difficulty, light-headedness, numbness and headaches  Psychiatric/Behavioral: Negative  Objective:    History   Smoking Status    Never Smoker   Smokeless Tobacco    Never Used       Allergies:    Allergies   Allergen Reactions    Demerol [Meperidine] Lightheadedness     Reaction Date: 11Jan2006;     Sulfa Antibiotics Hives     Reaction Date: 11Jan2006;        Vitals:  /80   Pulse 84   Temp (!) 96 °F (35 6 °C)   Resp 20   Ht 5' 5" (1 651 m)   Wt 73 5 kg (162 lb)   BMI 26 96 kg/m²     Current Outpatient Prescriptions   Medication Sig Dispense Refill    aspirin (ECOTRIN LOW STRENGTH) 81 mg EC tablet Take 1 tablet (81 mg total) by mouth daily  0    atorvastatin (LIPITOR) 40 mg tablet Take 1 tablet (40 mg total) by mouth every evening  0    calcium citrate-vitamin D (CITRACAL+D) 315-200 MG-UNIT per tablet Take by mouth daily      EPINEPHrine (EPIPEN) 0 3 mg/0 3 mL SOAJ USE AS DIRECTED IN CASE OF SEVERE ALLERGIC REACTION- SHORTNESS OF   (REFER TO PRESCRIPTION NOTES)  0    etodolac (LODINE) 400 MG tablet Take 1 tablet (400 mg total) by mouth 2 (two) times a day for 30 days 60 tablet 0    gabapentin (NEURONTIN) 100 mg capsule Take 1 capsule (100 mg total) by mouth 3 (three) times a day for 30 days 90 capsule 0    omeprazole (PriLOSEC) 40 MG capsule Take 40 mg by mouth daily before breakfast  0    polyethylene glycol (MIRALAX) 17 g packet Take 17 g by mouth daily as needed (constipation) 14 each 0    acetaminophen-codeine (TYLENOL #2) 300-15 MG per tablet Take 1 tablet by mouth daily at bedtime as needed for moderate pain for up to 7 days (Collaborative PHYS: Dr Kraft Nurse, License # [id number]) 7 tablet 0    meloxicam (MOBIC) 15 mg tablet Take 15 mg by mouth daily      piroxicam (FELDENE) 20 mg capsule Take 1 capsule (20 mg total) by mouth daily 30 capsule 1     No current facility-administered medications for this visit  Physical Exam   Constitutional: She is oriented to person, place, and time  She appears well-developed and well-nourished  Cardiovascular: Normal rate, regular rhythm and normal heart sounds  Pulmonary/Chest: Effort normal and breath sounds normal    Musculoskeletal: She exhibits no edema, tenderness or deformity  Walking slow without any assistive devices at this time  Left hand grasp is slow and weak  Neurological: She is alert and oriented to person, place, and time  Skin: Skin is warm and dry  Psychiatric: She has a normal mood and affect   Her behavior is normal  Judgment and thought content normal          SHRAVAN Parks

## 2018-03-28 ENCOUNTER — TELEPHONE (OUTPATIENT)
Dept: FAMILY MEDICINE CLINIC | Facility: CLINIC | Age: 63
End: 2018-03-28

## 2018-04-06 ENCOUNTER — TELEPHONE (OUTPATIENT)
Dept: FAMILY MEDICINE CLINIC | Facility: CLINIC | Age: 63
End: 2018-04-06

## 2018-04-06 NOTE — TELEPHONE ENCOUNTER
I left a message for Nadia Salinas to call us back  I would like to schedule her with Dr Wing Maya to discuss her e mail that she sent to Dr Wing Maya  I asked Lenis Rinne to please read the e mail since Dr Wing Maya isn't here today and she feels that she should see Dr Wing Francesco [employer]

## 2018-04-06 NOTE — TELEPHONE ENCOUNTER
----- Message from Laurann Kanner sent at 2018  7:56 AM EDT -----  Regarding: Visit Follow-Up Question  Contact: [phone]  [facility] - I have followed up with Dr Armando Bustos and Dr Zoltan Blum this week  Dr Armando Bustos ordered blood work (10 vials drawn yesterday, ) and I saw Dr Zoltan Blum yesterday  He does not feel my symptoms are neurological based on MRIs, but did not rule out peripheral nephropathy and scheduled EMG for 18  I am still having the pain primarily in right foot although left hurts also  Right has kept me up 2 nights since I stopped the Tylenol 2 that [facility] prescribed  I still feel that there is something neurological going on due to balance, failed finger-nose test on left only and other issues related to inability to control muscles mac  with left hand  Dr Zoltan Blum suggested I return to [facility] to deal with the foot pain  I would like to see an Orthopedic MD not Podiatrist   Should I contact [facility] on my own? I also have tingling in fingers (both hands) and other sporadic shooting pains in both legs as well as weakness in legs when climbing stairs  Physical therapy is coming to  the house 2-3 days a week  I am not steady on my feet, and struggle getting out of chair  I fell on 4/3 and bruised my right knee pretty bad  I have an FMLA leave due to  on  and would like another 30 days to allow time for the following:  Blood work from Dr Jose Juan Richards followup  Dr Bourgeois Double visual field test on  to determine if I can drive   EMG schedule for   There is no form to fill out, just an update to : [facility]  , [address]   [address], [facility], [address]  Fax [phone]  Thank you, Monika Tolbert 1955   [phone]

## 2018-04-09 ENCOUNTER — TELEPHONE (OUTPATIENT)
Dept: FAMILY MEDICINE CLINIC | Facility: CLINIC | Age: 63
End: 2018-04-09

## 2018-04-09 NOTE — TELEPHONE ENCOUNTER
Left message on machine requesting a call back  When pt calls back please let her know she will need to have her FMLA forms filled out by the provider who initially filled them out for her  We have no diagnoses codes for this as she seen a specialist regarding this  Ashley Doss will give pt a note ONLY for her upcomming appointment with her   Coleen Hatchet, [place]

## 2018-04-09 NOTE — TELEPHONE ENCOUNTER
Patient aware and understands we do not fill out FMLA paperwork    She will call to schedule a f/u with Dr Wing Maya when she knows her schedule    No further action required

## 2018-04-09 NOTE — TELEPHONE ENCOUNTER
Patient is on FMLA and needs to follow up with neuro to get further notes or FMLA  Tell patient to make appt with neuro as I will not extend her FMLA

## 2018-04-19 ENCOUNTER — OFFICE VISIT (OUTPATIENT)
Dept: FAMILY MEDICINE CLINIC | Facility: CLINIC | Age: 63
End: 2018-04-19
Payer: COMMERCIAL

## 2018-04-19 VITALS
TEMPERATURE: 97.2 F | HEART RATE: 78 BPM | RESPIRATION RATE: 20 BRPM | WEIGHT: 163 LBS | DIASTOLIC BLOOD PRESSURE: 70 MMHG | SYSTOLIC BLOOD PRESSURE: 116 MMHG | BODY MASS INDEX: 27.12 KG/M2

## 2018-04-19 DIAGNOSIS — M79.671 PAIN IN BOTH FEET: ICD-10-CM

## 2018-04-19 DIAGNOSIS — M79.672 PAIN IN BOTH FEET: ICD-10-CM

## 2018-04-19 DIAGNOSIS — R29.818 FOCAL NEUROLOGICAL DEFICIT: Primary | ICD-10-CM

## 2018-04-19 PROCEDURE — 99213 OFFICE O/P EST LOW 20 MIN: CPT | Performed by: NURSE PRACTITIONER

## 2018-04-19 RX ORDER — LANOLIN ALCOHOL/MO/W.PET/CERES
3 CREAM (GRAM) TOPICAL
COMMUNITY
End: 2019-02-13

## 2018-04-19 NOTE — PROGRESS NOTES
Assessment/Plan:  Burning resolved in both feet and pain in legs improved and will follow up with new neurologist and rheumatologist    Follow up with Rheumatologist   Consult new neurologist and pain management  Follow up with PCP  Supportive care discussed and advised  Follow up for no improvement and worsening of conditions  Patient advised and educated when to see immediate medical care  Follow up with Rheumatologist   Consult new neurologist and pain management  Follow up with PCP  Supportive care discussed and advised  Follow up for no improvement and worsening of conditions  Patient advised and educated when to see immediate medical care  Diagnoses and all orders for this visit:    Focal neurological deficit  -     Ambulatory referral to Neurology; Future  -     Ambulatory referral to Pain Management; Future    Pain in both feet  -     Ambulatory referral to Pain Management; Future    BMI 27 0-27 9,adult    Other orders  -     melatonin 3 mg; Take 3 mg by mouth daily at bedtime  -     Brimonidine Tartrate (ALPHAGAN P OP); Apply to eye          Return if symptoms worsen or fail to improve  Subjective:      Patient ID: James Pina is a 65 y o  female  Chief Complaint   Patient presents with    Leg pain     Left side weakness  started in march and now right side feels weakness        HPI  Patient stated was seen by neurologist and not happy with the care and still feeling weak and discomfort in legs but burning is better  Patient is going to see rheumatologist today  Walking with the cane  Taking Neurontin 100 mg 3 times daily  The following portions of the patient's history were reviewed and updated as appropriate: allergies, current medications, past family history, past medical history, past social history, past surgical history and problem list       Review of Systems   Constitutional: Positive for activity change  Respiratory: Negative  Cardiovascular: Negative  Musculoskeletal: Positive for myalgias  Negative for joint swelling  Neurological: Positive for weakness  Negative for dizziness, tremors, seizures, syncope, facial asymmetry, speech difficulty, light-headedness, numbness and headaches  Psychiatric/Behavioral: Negative for agitation, behavioral problems, confusion, decreased concentration, dysphoric mood, hallucinations, self-injury, sleep disturbance and suicidal ideas  The patient is nervous/anxious  The patient is not hyperactive  Objective:    History   Smoking Status    Never Smoker   Smokeless Tobacco    Never Used       Allergies: Allergies   Allergen Reactions    Demerol [Meperidine] Lightheadedness     Reaction Date: 11Jan2006;     Sulfa Antibiotics Hives     Reaction Date: 11Jan2006;        Vitals:  /70   Pulse 78   Temp (!) 97 2 °F (36 2 °C)   Resp 20   Wt 73 9 kg (163 lb)   BMI 27 12 kg/m²     Current Outpatient Prescriptions   Medication Sig Dispense Refill    aspirin (ECOTRIN LOW STRENGTH) 81 mg EC tablet Take 1 tablet (81 mg total) by mouth daily  0    atorvastatin (LIPITOR) 40 mg tablet Take 1 tablet (40 mg total) by mouth every evening  0    Brimonidine Tartrate (ALPHAGAN P OP) Apply to eye      calcium citrate-vitamin D (CITRACAL+D) 315-200 MG-UNIT per tablet Take by mouth daily      etodolac (LODINE) 400 MG tablet Take 1 tablet (400 mg total) by mouth 2 (two) times a day for 30 days 60 tablet 0    gabapentin (NEURONTIN) 100 mg capsule Take 1 capsule (100 mg total) by mouth 3 (three) times a day for 30 days 90 capsule 0    melatonin 3 mg Take 3 mg by mouth daily at bedtime      omeprazole (PriLOSEC) 40 MG capsule Take 40 mg by mouth daily before breakfast  0    polyethylene glycol (MIRALAX) 17 g packet Take 17 g by mouth daily as needed (constipation) 14 each 0    EPINEPHrine (EPIPEN) 0 3 mg/0 3 mL SOAJ USE AS DIRECTED IN CASE OF SEVERE ALLERGIC REACTION- SHORTNESS OF   (REFER TO PRESCRIPTION NOTES)    0 No current facility-administered medications for this visit  Physical Exam   Constitutional: She is oriented to person, place, and time  She appears well-developed and well-nourished  Cardiovascular: Normal rate, regular rhythm and normal heart sounds  Pulmonary/Chest: Effort normal and breath sounds normal    Musculoskeletal: She exhibits no edema, tenderness or deformity  Walking slowly and using cane for walking and generalized myalgias and worse in legs  Neurological: She is alert and oriented to person, place, and time  No sensory deficit  GCS eye subscore is 4  GCS verbal subscore is 5  GCS motor subscore is 6  Skin: Skin is warm and dry  No rash noted  No erythema  Psychiatric: She has a normal mood and affect   Her behavior is normal  Judgment and thought content normal          SHRAVAN Ospina

## 2018-04-19 NOTE — PATIENT INSTRUCTIONS
Follow up with Rheumatologist   Consult new neurologist and pain management  Follow up with PCP  Supportive care discussed and advised  Follow up for no improvement and worsening of conditions  Patient advised and educated when to see immediate medical care

## 2018-04-20 ENCOUNTER — TRANSCRIBE ORDERS (OUTPATIENT)
Dept: ADMINISTRATIVE | Facility: HOSPITAL | Age: 63
End: 2018-04-20

## 2018-04-20 ENCOUNTER — HOSPITAL ENCOUNTER (OUTPATIENT)
Dept: RADIOLOGY | Facility: HOSPITAL | Age: 63
Discharge: HOME | End: 2018-04-20
Attending: INTERNAL MEDICINE
Payer: COMMERCIAL

## 2018-04-20 DIAGNOSIS — M25.551 RIGHT HIP PAIN: ICD-10-CM

## 2018-04-20 DIAGNOSIS — R52 PAIN: ICD-10-CM

## 2018-04-20 DIAGNOSIS — M25.562 LEFT KNEE PAIN, UNSPECIFIED CHRONICITY: ICD-10-CM

## 2018-04-20 DIAGNOSIS — M54.5 LOW BACK PAIN, UNSPECIFIED BACK PAIN LATERALITY, UNSPECIFIED CHRONICITY, WITH SCIATICA PRESENCE UNSPECIFIED: ICD-10-CM

## 2018-04-20 DIAGNOSIS — M25.562 LEFT KNEE PAIN, UNSPECIFIED CHRONICITY: Primary | ICD-10-CM

## 2018-04-20 PROCEDURE — 73562 X-RAY EXAM OF KNEE 3: CPT

## 2018-04-20 PROCEDURE — 72110 X-RAY EXAM L-2 SPINE 4/>VWS: CPT

## 2018-04-20 PROCEDURE — 72170 X-RAY EXAM OF PELVIS: CPT

## 2018-06-12 ENCOUNTER — EVALUATION (OUTPATIENT)
Dept: PHYSICAL THERAPY | Facility: CLINIC | Age: 63
End: 2018-06-12
Payer: COMMERCIAL

## 2018-06-12 DIAGNOSIS — R53.1 WEAKNESS GENERALIZED: Primary | ICD-10-CM

## 2018-06-12 PROCEDURE — G8978 MOBILITY CURRENT STATUS: HCPCS

## 2018-06-12 PROCEDURE — G8979 MOBILITY GOAL STATUS: HCPCS

## 2018-06-12 PROCEDURE — 97163 PT EVAL HIGH COMPLEX 45 MIN: CPT

## 2018-06-12 NOTE — PROGRESS NOTES
PT Evaluation     Today's date: 2018  Patient name: Maricarmen Cleaning  : 1955  MRN: [id number]  Referring provider: David Smith MD  Dx:   Encounter Diagnosis     ICD-10-CM    1  Weakness generalized R53 1        Start Time:   Stop Time:   Total time in clinic (min): 45 minutes    Assessment  Impairments: abnormal coordination, abnormal gait, abnormal movement, activity intolerance, impaired balance, impaired physical strength, pain with function, safety issue, poor posture  and poor body mechanics  Other impairment: Potential Conversion Disorder    Assessment details: Patient is a 65year old female reporting to skilled PT for balance and gait deficits secondary to reports of leg weakness and apparent neurological deficits  Patient presents as a fall risk via the DGI, TUG, and Gait speed  Patient's fall risk could be due to gait deviations of L hip hike  Patient's endurance limited via the 6 minute walk test, which she self-terminated due to knee pain  MMT indicates decreases in LE strength  Patient's transfer capabilities limited due to LE weakness and unable to complete full sit to stand without UE support, see 5x sit to stand  When patient was asked to perform steps, patient became very anxious and presented with tremors, indicating a potential increase in anxiety increasing her symptoms, which is consistent with the reports of conversion disorder diagnosis in the hospital, further investigation may be needed  Patient requires skilled PT to maximize function and to improve her confidence with activity       Understanding of Dx/Px/POC: good   Prognosis: good  Prognosis details: Short Term Goals:   -Patient will improve their 5 time sit to stand score by 10 seconds or more in 4 weeks in order to improve their transfer capabilities    -Patient will complete a full 6 minute walk test in 4 weeks to gain an in depth assessment of cardiovascular endurance, improve distance by 100 feet or more   -Patient will complete an FGA in 4 weeks to gain a more in depth via of his dynamic balance capabilities    -Patient will improve their DGI score by 3 points or more in 4 weeks in order to improve their dynamic balance capabilities    -Patient will improve their Feet Together eyes open on firm surface by 5 seconds to improve their balance in the dark  Long Term Goals:   -Patient will decrease their fall risk in 2/4 fall risk tests in 8 weeks in order to decrease their fall risk stratification    -Patient will improve their mCTSIB feet together eyes closed on foam time to 5 seconds or more in 8 weeks to improve their balance on compliant surfaces    -Patient will ascend and descend step with no UE in 8 weeks in order to improve her confidence with stair negotiation to improve her community mobility       Plan  Planned modality interventions: biofeedback and TENS  Planned therapy interventions: abdominal trunk stabilization, activity modification, ADL retraining, ADL training, IADL retraining, joint mobilization, manual therapy, muscle pump exercises, motor coordination training, neuromuscular re-education, postural training, patient education, balance, balance/weight bearing training, behavior modification, body mechanics training, breathing training, compression, coordination, strengthening, stretching, therapeutic activities, therapeutic exercise, therapeutic training, transfer training, home exercise program, graded motor, graded exercise, graded activity, gait training, functional ROM exercises and flexibility  Other planned therapy interventions: CPT Codes: 38768, 40817, [id number], 34009, 35915, , [id number], [id number], [id number]  Frequency: 2x week  Duration in visits: 16  Duration in weeks: 8  Treatment plan discussed with: patient  Plan details: POC Certification Dates:   6/12/2018-8/7/2018        Subjective Evaluation    History of Present Illness  Date of onset: 2/1/2018  Mechanism of injury: Patient is a 65 year old female reporting to skilled PT for gait dysfunction  Patient reported that he noted "abnormal sensations" on the bilateral LE and reports of urinary incontinence  Patient reports that she also reported that she had burning foot pain  3/3/2018 noted that she went to the hospital due to reports that she noted L facial paralysis  Patient noted that she also had L LE weakness  Patient stated that she noted progressive weakness, noted a 17 day hospital stay to rehabilitate self  Patient notes R lower quadrant field cut  Patient reports infrequent headaches and infrequent dizziness reports with certain head movement  Patient notes increased "urgency" to utilize the restroom  Notes daily falls  Notes neurologist stated in hospital stay she suffered from a conversion disorder  Recurrent probem    Quality of life: excellent    Pain  Current pain ratin  At best pain ratin  At worst pain rating: 10  Location: Bilateral LEs  Quality: dull ache    Social Support  Steps to enter house: yes  1  Stairs in house: yes   12  Lives in: multiple-level home  Lives with: spouse    Employment status: working (Part Time desk work )  Hand dominance: right      Diagnostic Tests    FCE comments: See Medical History Treatments  Discharged from (in last 30 days): home health care  Patient Goals  Patient goals for therapy: return to work, increased motion, improved balance, decreased pain, increased strength, independence with ADLs/IADLs and return to sport/leisure activities  Patient goal: Patient's goals are to improve her balance and walking ability           Objective     Static Posture     Comments  MCTSIB:  -Feet Together eyes open on firm- 8 09 seconds  -Feet Together eyes closed on firm- 2 seconds  -Feet Together eyes open on foam- 0 seconds  -Feet Together eyes closed on foam- 0 Seconds   8 09, 2, 0, 0    Strength/Myotome Testing     Left Hip   Planes of Motion   Flexion: 4-  Extension: 4-  Abduction: 3+  Adduction: 4-    Right Hip   Planes of Motion   Flexion: 4-  Extension: 4-  Abduction: 3+  Adduction: 4-    Left Knee   Flexion: 3+  Extension: 4-    Right Knee   Flexion: 3+  Extension: 4-    Left Ankle/Foot   Dorsiflexion: 3+    Right Ankle/Foot   Dorsiflexion: 3+    Ambulation     Comments   Gait Speed- 10 meters/8 19 seconds= 1 22 meters/second  TUG Test- 10 52 seconds  6 minute walk test- 5 minutes and 11 seconds completed- 845 feet    Functional Assessment     Comments  30 second stand test- 4 reps  5 times sit to stand- 35 seconds     Ascending and Descending Step- Required 2 UE support, attempt without UE caused increased anxiety      Flowsheet Rows      Most Recent Value   PT/OT G-Codes   Current Score  49   Projected Score  67   FOTO information reviewed  Yes   Assessment Type  Evaluation   G code set  Mobility: Walking & Moving Around   Mobility: Walking and Moving Around Current Status ()  CK   Mobility: Walking and Moving Around Goal Status ()  CJ          Precautions: Fall Risk   Daily Treatment Diary     Manual                                                                                   Exercise Diary                                                                                                                                                                                                                                                                                      Modalities

## 2018-06-19 ENCOUNTER — OFFICE VISIT (OUTPATIENT)
Dept: PHYSICAL THERAPY | Facility: CLINIC | Age: 63
End: 2018-06-19
Payer: COMMERCIAL

## 2018-06-19 DIAGNOSIS — R53.1 WEAKNESS GENERALIZED: Primary | ICD-10-CM

## 2018-06-19 PROCEDURE — 97110 THERAPEUTIC EXERCISES: CPT

## 2018-06-19 PROCEDURE — 97530 THERAPEUTIC ACTIVITIES: CPT

## 2018-06-19 NOTE — PROGRESS NOTES
Daily Note     Today's date: 2018  Patient name: Sonia Shepherd  : 1955  MRN: [id number]  Referring provider: Alexa Walton MD  Dx:   Encounter Diagnosis     ICD-10-CM    1  Weakness generalized R53 1        Start Time:   Stop Time:   Total time in clinic (min): 45 minutes    Subjective: Patient reported that she feels "about the same", attempted steps but notes R LE tremor with descending  Notes "I'm slightly clumsy with my hands"  Patient started her new job today of , states she was nervous  Objective: See treatment diary below  Precautions Fall Risk    Specialty Daily Treatment Diary     Manual                                                     Exercise Diary         Sit to stands with airex pad on seat, No UE 20 reps, 2 sets       Heel Raised, 2 UE support 20 reps, 2 sets, 3 second holds       Toe Raises, 2 UE support 20 reps, 1 set       Hip Flexion 2 Ue support, 20 reps, 3 second hold       Hip Abduction 2 Ue support, 20 reps, 3 second hold       Hip Extension 2 Ue support, 20 reps, 3 second hold       Hurdles in Parallel bars- Forwards 3 cycles, 6" hurdles, 2 UE support       Hurdles in Parallel bars- Sideways 3 cycles, 6" hurdles, 2 UE support       Hurdles in Parallel bars- Backwards 3 cycles, 6" hurdles, 2 UE support                                                                                                   Modalities                                    Assessment: Patient tolerated treatment well today, able to perform HEP today with no difficulty  Patient demonstrated intension tremors with negotiation over hurdles, which may be attributed to her primary diagnosis  Patient demonstrates bilateral lower extremity weakness, which may be attributed to her sit to stand activity requiring an airex pad underneath her gluteal region to assist with the standing activity   Patient requires skilled PT to improve her function and improve her safety with daily activities  Plan: Continue per plan of care  Progress treatment as tolerated

## 2018-06-21 ENCOUNTER — OFFICE VISIT (OUTPATIENT)
Dept: PHYSICAL THERAPY | Facility: CLINIC | Age: 63
End: 2018-06-21
Payer: COMMERCIAL

## 2018-06-21 DIAGNOSIS — R53.1 WEAKNESS GENERALIZED: Primary | ICD-10-CM

## 2018-06-21 PROCEDURE — 97112 NEUROMUSCULAR REEDUCATION: CPT

## 2018-06-21 PROCEDURE — 97530 THERAPEUTIC ACTIVITIES: CPT

## 2018-06-21 NOTE — PROGRESS NOTES
Daily Note     Today's date: 2018  Patient name: Odilon Lam  : 1955  MRN: [id number]  Referring provider: Ruy Clayton MD  Dx:   Encounter Diagnosis     ICD-10-CM    1  Weakness generalized R53 1        Start Time: 1245  Stop Time: 1330  Total time in clinic (min): 45 minutes    Subjective: Patient reported that she was "slightly sore today" reporting that it is from her Home Exercise Program, notes no difficulties at home         Objective: See treatment diary below  Precautions Fall Risk    Specialty Daily Treatment Diary     Manual                                                     Exercise Diary        Sit to stands with airex pad on seat, No UE 20 reps, 2 sets       Heel Raised, 2 UE support 20 reps, 2 sets, 3 second holds       Toe Raises, 2 UE support 20 reps, 1 set       Hip Flexion 2 Ue support, 20 reps, 3 second hold       Hip Abduction 2 Ue support, 20 reps, 3 second hold       Hip Extension 2 Ue support, 20 reps, 3 second hold       Hurdles in Parallel bars- Forwards 3 cycles, 6" hurdles, 2 UE support       Hurdles in Parallel bars- Sideways 3 cycles, 6" hurdles, 2 UE support       Hurdles in Parallel bars- Backwards 3 cycles, 6" hurdles, 2 UE support       Ambulation over Foam pads- Forward  5 cycles in parallel bars, 2 UE support, 3 consecutive pads      Ambulation over Foam pads- Sideways  5 cycles in parallel bars, 2 UE support, 3 consecutive pads      Step Ups and Step Downs forward over raised surface, 2 UE support  2 UE support, 4 inch steps, 10 cycles over and back      Step Ups and Step Downs forward over raised surface, 0 UE support   2 UE support, 4 inch steps, 10 cycles over and back      Carrying 10 lb med ball to simulate carrying laundry while ambulating   50 feet, 4 cycles      Reaching for cones, standing FT on Foam, Rotational Component  20 cones, 2 sets      Lifting Mechanics with med ball   10 lb med ball, 20 reps with proper squatting mechanics Modalities                                    Assessment: Patient tolerated treatment well today, able to progress to activities today with functional activities such as lifting mechanics  Patient required verbal and tactile cuing approximately 25% of the time for proper depth of squat and reduction of lumbar flexion  Intention tremors continually present during negotiation of steps, although confidence improving with negotiation  Patient requires skilled PT to improve her function and improve her safety with daily activities  Plan: Continue per plan of care  Progress treatment as tolerated

## 2018-06-26 ENCOUNTER — OFFICE VISIT (OUTPATIENT)
Dept: PHYSICAL THERAPY | Facility: CLINIC | Age: 63
End: 2018-06-26
Payer: COMMERCIAL

## 2018-06-26 DIAGNOSIS — R53.1 WEAKNESS GENERALIZED: Primary | ICD-10-CM

## 2018-06-26 PROCEDURE — 97112 NEUROMUSCULAR REEDUCATION: CPT

## 2018-06-26 PROCEDURE — 97110 THERAPEUTIC EXERCISES: CPT

## 2018-06-26 NOTE — PROGRESS NOTES
Daily Note     Today's date: 2018  Patient name: Laurann Kanner  : 1955  MRN: [id number]  Referring provider: Salomón Bass MD  Dx:   Encounter Diagnosis     ICD-10-CM    1  Weakness generalized R53 1        Start Time: 1015  Stop Time: 1110  Total time in clinic (min): 55 minutes    Subjective: Pt reports HEP was challenging to complete in one setting previously  She now breaks up them into smaller groups  Pt reports pain with right hip movement  Pain upon arrival 6/10 in bilateral hips  Pain also in calves and knees          Objective: See treatment diary below  Precautions Fall Risk    Specialty Daily Treatment Diary     Manual                                                     Exercise Diary  18     Sit to stands with airex pad on seat, No UE 20 reps, 2 sets  20 reps  2 sets, No UE     Heel Raised, 2 UE support 20 reps, 2 sets, 3 second holds  1 UE, 20 reps  3 sec hold     Toe Raises, 2 UE support 20 reps, 1 set  1 UE, 20 reps  3 sec hold     Hip Flexion 2 Ue support, 20 reps, 3 second hold       Hip Abduction 2 Ue support, 20 reps, 3 second hold       Hip Extension 2 Ue support, 20 reps, 3 second hold       Hurdles in Parallel bars- Forwards 3 cycles, 6" hurdles, 2 UE support       Hurdles in Parallel bars- Sideways 3 cycles, 6" hurdles, 2 UE support       Hurdles in Parallel bars- Backwards 3 cycles, 6" hurdles, 2 UE support       Ambulation over Foam pads- Forward  5 cycles in parallel bars, 2 UE support, 3 consecutive pads 5 cycles in parallel bars, 2 UE support, 3 consecutive pads     Ambulation over Foam pads- Sideways  5 cycles in parallel bars, 2 UE support, 3 consecutive pads      Step Ups and Step Downs forward over raised surface, 2 UE support  2 UE support, 4 inch steps, 10 cycles over and back 0-1 UE support  4" step  12 cycles     Step Ups and Step Downs forward over raised surface, 0 UE support   2 UE support, 4 inch steps, 10 cycles over and back      Carrying 10 lb med ball to simulate carrying laundry while ambulating   50 feet, 4 cycles      Reaching for cones, standing FT on Foam, Rotational Component  20 cones, 2 sets      Lifting Mechanics with med ball   10 lb med ball, 20 reps with proper squatting mechanics      Calf stretches at counter   20 sec bilat   3 reps each      sidelying glut med clamshell    R, L 10 each  No increase in pain  minisquats    Mirror feedback and TC  10 reps                  Modalities                                  Tenderness in gluteus medius bilaterally  Pt reports pain with squatting and pain after 5 reps of active hip ABD in standing at home  Pt was advised to reduce repetitions for comfort  Assessment: Pt displays gluteus medius tenderness and weakness  Calf stretch and side-lying clamshells added to HEP  See copies in media  Pt encouraged to use neutral hip position (avoid IR of hip) during sit to stand and squats  Pt remains anxious and displays shaking occasionally in PT  Patient requires skilled PT to improve her function and improve her safety with daily activities  Reduced pain: Left hip 5/10  Right hip 2/10  Pain in calves 1/10 end of session  Plan: Continue per plan of care  Progress treatment as tolerated

## 2018-06-27 ENCOUNTER — APPOINTMENT (OUTPATIENT)
Dept: PHYSICAL THERAPY | Facility: CLINIC | Age: 63
End: 2018-06-27
Payer: COMMERCIAL

## 2018-06-28 ENCOUNTER — OFFICE VISIT (OUTPATIENT)
Dept: PHYSICAL THERAPY | Facility: CLINIC | Age: 63
End: 2018-06-28
Payer: COMMERCIAL

## 2018-06-28 DIAGNOSIS — R53.1 WEAKNESS GENERALIZED: Primary | ICD-10-CM

## 2018-06-28 PROCEDURE — G8979 MOBILITY GOAL STATUS: HCPCS

## 2018-06-28 PROCEDURE — 97530 THERAPEUTIC ACTIVITIES: CPT

## 2018-06-28 PROCEDURE — G8978 MOBILITY CURRENT STATUS: HCPCS

## 2018-06-28 PROCEDURE — 97112 NEUROMUSCULAR REEDUCATION: CPT

## 2018-06-28 NOTE — PROGRESS NOTES
Daily Note     Today's date: 2018  Patient name: Wilbur Haro  : 1955  MRN: [id number]  Referring provider: Burke Batista MD  Dx:   Encounter Diagnosis     ICD-10-CM    1  Weakness generalized R53 1        Start Time: 1545  Stop Time: 1630  Total time in clinic (min): 45 minutes    Subjective: Pt reports HEP is going well at home, reports a 2/10 pain in the hips today  No noted difficulties at home        Objective: See treatment diary below  Precautions Fall Risk    Specialty Daily Treatment Diary     Manual                                                     Exercise Diary  18    Sit to stands with airex pad on seat, No UE 20 reps, 2 sets  20 reps  2 sets, No UE 25 reps, no UE    Heel Raised, 2 UE support 20 reps, 2 sets, 3 second holds  1 UE, 20 reps  3 sec hold     Toe Raises, 2 UE support 20 reps, 1 set  1 UE, 20 reps  3 sec hold     Hip Flexion 2 Ue support, 20 reps, 3 second hold       Hip Abduction 2 Ue support, 20 reps, 3 second hold       Hip Extension 2 Ue support, 20 reps, 3 second hold       Hurdles in Parallel bars- Forwards 3 cycles, 6" hurdles, 2 UE support       Hurdles in Parallel bars- Sideways 3 cycles, 6" hurdles, 2 UE support       Hurdles in Parallel bars- Backwards 3 cycles, 6" hurdles, 2 UE support       Ambulation over Foam pads- Forward  5 cycles in parallel bars, 2 UE support, 3 consecutive pads 5 cycles in parallel bars, 2 UE support, 3 consecutive pads     Ambulation over Foam pads- Sideways  5 cycles in parallel bars, 2 UE support, 3 consecutive pads      Step Ups and Step Downs forward over raised surface, 2 UE support  2 UE support, 4 inch steps, 10 cycles over and back 0-1 UE support  4" step  12 cycles     Step Ups and Step Downs forward over raised surface, 0 UE support   2 UE support, 4 inch steps, 10 cycles over and back      Carrying 10 lb med ball to simulate carrying laundry while ambulating   50 feet, 4 cycles      Reaching for cones, standing FT on Foam, Rotational Component  20 cones, 2 sets      Lifting Mechanics with med ball   10 lb med ball, 20 reps with proper squatting mechanics      Calf stretches at counter   20 sec bilat   3 reps each      sidelying glut med clamshell    R, L 10 each  No increase in pain  minisquats    Mirror feedback and TC  10 reps      Seated Hip Flexion over physioball (green)    20 reps seated no UE    Seated hamstring stretch     Performed on 8" raised block, 30 second hold, 3 sets bilaterally     Ambulation over uneven surfaces, simulated with foam beams underneath mat- forward    5 cycles, 2 UE support    Ambulation over uneven surfaces, simulated with foam beams underneath mat- Sideways    5 cycles, 2 UE support    Ambulation over uneven surfaces, simulated with foam beams underneath mat- Backward    5 cycles, 2 UE support    Forward ambulation step up and down onto bosu ball- Forward    2 UE support, 10 cycles stepping up and stepping down, 3 second hold at the top    Forward ambulation step up and down onto bosu ball- Sideways    2 UE support, 10 cycles stepping up and stepping down, 3 second hold at the top    Curb step no UE support    5 reps                Modalities                                      Assessment: Patient tolerated treatment well, reporting hamstring stretching was effective in reducing her bilateral hamstring tightness  Patient is very hesitant and presents with tremors stepping up and down from surfaces, specifically curbs and surfaces with no external support  When patient was cued to simply step down and not hesitate to perform, patient was able to perform tremor free and with increased performance, indicating potential anxiety derived symptoms with her difficulties physically  Continue to progress as tolerated  Patient requires skilled PT to improve and maximize her function  Plan: Continue per plan of care  Progress treatment as tolerated

## 2018-07-03 ENCOUNTER — OFFICE VISIT (OUTPATIENT)
Dept: PHYSICAL THERAPY | Facility: CLINIC | Age: 63
End: 2018-07-03
Payer: COMMERCIAL

## 2018-07-03 DIAGNOSIS — R53.1 WEAKNESS GENERALIZED: Primary | ICD-10-CM

## 2018-07-03 PROCEDURE — 97112 NEUROMUSCULAR REEDUCATION: CPT

## 2018-07-03 NOTE — PROGRESS NOTES
Daily Note     Today's date: 7/3/2018  Patient name: William Millan  : 1955  MRN: [id number]  Referring provider: Filiberto Hsu MD  Dx:   Encounter Diagnosis     ICD-10-CM    1  Weakness generalized R53 1        Start Time: 1059  Stop Time: 1145  Total time in clinic (min): 46 minutes    Subjective: Pt reports no difficulties since last appointment, improving confidence with each day         Objective: See treatment diary below  Precautions Fall Risk    Specialty Daily Treatment Diary     Manual                                                     Exercise Diary  18   Sit to stands with airex pad on seat, No UE 20 reps, 2 sets  20 reps  2 sets, No UE 25 reps, no UE    Heel Raised, 2 UE support 20 reps, 2 sets, 3 second holds  1 UE, 20 reps  3 sec hold     Toe Raises, 2 UE support 20 reps, 1 set  1 UE, 20 reps  3 sec hold     Hip Flexion 2 Ue support, 20 reps, 3 second hold       Hip Abduction 2 Ue support, 20 reps, 3 second hold       Hip Extension 2 Ue support, 20 reps, 3 second hold       Hurdles in Parallel bars- Forwards 3 cycles, 6" hurdles, 2 UE support    5 cycles outside of parallel bars, no UE, 6" hurdles    Hurdles in Parallel bars- Sideways 3 cycles, 6" hurdles, 2 UE support    5 cycles outside of parallel bars, no UE, 6" hurdles    Hurdles in Parallel bars- Backwards 3 cycles, 6" hurdles, 2 UE support    5 cycles outside of parallel bars, no UE, 6" hurdles    Ambulation over Foam pads- Forward  5 cycles in parallel bars, 2 UE support, 3 consecutive pads 5 cycles in parallel bars, 2 UE support, 3 consecutive pads     Ambulation over Foam pads- Sideways  5 cycles in parallel bars, 2 UE support, 3 consecutive pads      Step Ups and Step Downs forward over raised surface, 2 UE support  2 UE support, 4 inch steps, 10 cycles over and back 0-1 UE support  4" step  12 cycles     Step Ups and Step Downs forward over raised surface, 0 UE support   2 UE support, 4 inch steps, 10 cycles over and back      Carrying 10 lb med ball to simulate carrying laundry while ambulating   50 feet, 4 cycles      Reaching for cones, standing FT on Foam, Rotational Component  20 cones, 2 sets      Lifting Mechanics with med ball   10 lb med ball, 20 reps with proper squatting mechanics      Calf stretches at counter   20 sec bilat   3 reps each      sidelying glut med clamshell    R, L 10 each  No increase in pain  minisquats    Mirror feedback and TC  10 reps      Seated Hip Flexion over physioball (green)    20 reps seated no UE    Seated hamstring stretch     Performed on 8" raised block, 30 second hold, 3 sets bilaterally     Ambulation over uneven surfaces, simulated with foam beams underneath mat- forward    5 cycles, 2 UE support    Ambulation over uneven surfaces, simulated with foam beams underneath mat- Sideways    5 cycles, 2 UE support    Ambulation over uneven surfaces, simulated with foam beams underneath mat- Backward    5 cycles, 2 UE support    Forward ambulation step up and down onto bosu ball- Forward    2 UE support, 10 cycles stepping up and stepping down, 3 second hold at the top    Forward ambulation step up and down onto bosu ball- Sideways    2 UE support, 10 cycles stepping up and stepping down, 3 second hold at the top    Curb step no UE support    5 reps    Stepping outside of JEAN-CLAUDE with colored discs-Forwards     25 steps placed outside JEAN-CLAUDE   Stepping outside of JEAN-CLAUDE with colored discs- Sideways     25 steps placed outside JEAN-CLAUDE   FT cone rotations stacked on raised surfaces     15 cones, performed 2 sets   FT cone rotations stacked on raised surfaces     15 cones, performed 2 sets   SLS in corner     10 sets, 10 second holds               Modalities                                      Assessment: Patient tolerated treatment well, able to progress her balance capabilities to outside the parallel bars today, indicating an increase in confidence with balance   Patient notes difficulties with single limb stance activities, given to perform at home in corner for safety purposes  Patient demonstrated adequate balance on foam with compliant surfaces  Continue to progress as tolerated  Patient requires skilled PT to improve and maximize her function  Plan: Continue per plan of care  Progress treatment as tolerated

## 2018-07-09 ENCOUNTER — APPOINTMENT (OUTPATIENT)
Dept: PHYSICAL THERAPY | Facility: CLINIC | Age: 63
End: 2018-07-09
Payer: COMMERCIAL

## 2018-07-10 ENCOUNTER — TELEPHONE (OUTPATIENT)
Dept: FAMILY MEDICINE CLINIC | Facility: CLINIC | Age: 63
End: 2018-07-10

## 2018-07-11 ENCOUNTER — TELEPHONE (OUTPATIENT)
Dept: FAMILY MEDICINE CLINIC | Facility: CLINIC | Age: 63
End: 2018-07-11

## 2018-07-11 NOTE — TELEPHONE ENCOUNTER
I think it was ordered by Dr Flor Mosqueda as I checked OT people notes and needs to be signed by him

## 2018-07-11 NOTE — TELEPHONE ENCOUNTER
Called [facility]  no answer, no machine to leave message, try again tomorrow

## 2018-07-11 NOTE — TELEPHONE ENCOUNTER
Form dropped off for Dr Rocco Saleh to fill out  Not sure if can be filled out by us  Will give to Dr Kenn Shaw to review

## 2018-07-11 NOTE — TELEPHONE ENCOUNTER
I can't sign something completed in pencil, please call maryanne to fill this out in something more permanent  Form at nurse's station

## 2018-07-11 NOTE — TELEPHONE ENCOUNTER
Tiara Khan who is the  for these forms  Said Dr russell is the person who needs to sign   Placed in DR [facility] folder to advise

## 2018-07-12 ENCOUNTER — OFFICE VISIT (OUTPATIENT)
Dept: PHYSICAL THERAPY | Facility: CLINIC | Age: 63
End: 2018-07-12
Payer: COMMERCIAL

## 2018-07-12 ENCOUNTER — APPOINTMENT (OUTPATIENT)
Dept: PHYSICAL THERAPY | Facility: CLINIC | Age: 63
End: 2018-07-12
Payer: COMMERCIAL

## 2018-07-12 ENCOUNTER — TELEPHONE (OUTPATIENT)
Dept: FAMILY MEDICINE CLINIC | Facility: CLINIC | Age: 63
End: 2018-07-12

## 2018-07-12 DIAGNOSIS — R53.1 WEAKNESS GENERALIZED: Primary | ICD-10-CM

## 2018-07-12 PROCEDURE — 97116 GAIT TRAINING THERAPY: CPT

## 2018-07-12 PROCEDURE — 97110 THERAPEUTIC EXERCISES: CPT

## 2018-07-12 PROCEDURE — 97530 THERAPEUTIC ACTIVITIES: CPT

## 2018-07-12 NOTE — PROGRESS NOTES
Daily Note     Today's date: 2018  Patient name: Shweta Duran  : 1955  MRN: [id number]  Referring provider: Micheal Ballard MD  Dx:   Encounter Diagnosis     ICD-10-CM    1  Weakness generalized R53 1        Start Time:   Stop Time: 0  Total time in clinic (min): 45 minutes    Subjective: Pt reports no difficulties since last appointment, improving confidence with each day  Patient states that she feels her confidence improving each day  Notes difficulties with steps due to depth perception issues        Objective: See treatment diary below  Precautions Fall Risk    Specialty Daily Treatment Diary     Manual                                                     Exercise Diary  18   Sit to stands with airex pad on seat, No UE   20 reps  2 sets, No UE 25 reps, no UE    Heel Raised, 2 UE support   1 UE, 20 reps  3 sec hold     Toe Raises, 2 UE support   1 UE, 20 reps  3 sec hold     Hip Flexion        Hip Abduction        Hip Extension        Hurdles in Parallel bars- Forwards     5 cycles outside of parallel bars, no UE, 6" hurdles    Hurdles in Parallel bars- Sideways     5 cycles outside of parallel bars, no UE, 6" hurdles    Hurdles in Parallel bars- Backwards     5 cycles outside of parallel bars, no UE, 6" hurdles    Ambulation over Foam pads- Forward  5 cycles in parallel bars, 2 UE support, 3 consecutive pads 5 cycles in parallel bars, 2 UE support, 3 consecutive pads     Ambulation over Foam pads- Sideways  5 cycles in parallel bars, 2 UE support, 3 consecutive pads      Step Ups and Step Downs forward over raised surface, 2 UE support  2 UE support, 4 inch steps, 10 cycles over and back 0-1 UE support  4" step  12 cycles     Step Ups and Step Downs forward over raised surface, 0 UE support   2 UE support, 4 inch steps, 10 cycles over and back      Carrying 10 lb med ball to simulate carrying laundry while ambulating   50 feet, 4 cycles      Reaching for cones, standing FT on Foam, Rotational Component  20 cones, 2 sets      Lifting Mechanics with med ball   10 lb med ball, 20 reps with proper squatting mechanics      Calf stretches at counter   20 sec bilat   3 reps each      sidelying glut med clamshell    R, L 10 each  No increase in pain        minisquats    Mirror feedback and TC  10 reps      Seated Hip Flexion over physioball (green)    20 reps seated no UE    Seated hamstring stretch     Performed on 8" raised block, 30 second hold, 3 sets bilaterally     Ambulation over uneven surfaces, simulated with foam beams underneath mat- forward    5 cycles, 2 UE support    Ambulation over uneven surfaces, simulated with foam beams underneath mat- Sideways    5 cycles, 2 UE support    Ambulation over uneven surfaces, simulated with foam beams underneath mat- Backward    5 cycles, 2 UE support    Forward ambulation step up and down onto bosu ball- Forward    2 UE support, 10 cycles stepping up and stepping down, 3 second hold at the top    Forward ambulation step up and down onto bosu ball- Sideways    2 UE support, 10 cycles stepping up and stepping down, 3 second hold at the top    Curb step no UE support    5 reps    Stepping outside of JEAN-CLAUDE with colored discs-Forwards     25 steps placed outside JEAN-CLAUDE   Stepping outside of JEAN-CLAUDE with colored discs- Sideways     25 steps placed outside JEAN-CLAUDE   FT cone rotations stacked on raised surfaces     15 cones, performed 2 sets   FT cone rotations stacked on raised surfaces     15 cones, performed 2 sets   SLS in corner 10 sets, 20 second holds, 21 second max hold    10 sets, 10 second holds   Ambulation outside- over grass, on side walks, increased curb heights, steps 15'       Ladder Ball Single Leg stance 10' with cues for core control       Side stepping on foam beam No UE, 6 cycles in parallel bars       Tandem stance on foam beam No UE, 6 cycles in parallel bars       Ascending and Descending flights of steps 10 steps= 1 flight; 8 flights of steps; no UE on hand rail                                                   Modalities                                      Assessment: Patient tolerated treatment well, able to recognize potential visual deficits due to history of R eye difficulties with vision, being followed by her opthamologist  Patient and treating therapist recognized covering her R eye improves her depth perception deficits on stairs and with ambulation, due to R drift during ambulation activities  Patient improving with confidence with functional activities, higher level static balance activities of single leg stance challenged patient's balance, improvements in time from last session  Continue to progress as tolerated  Patient requires skilled PT to improve and maximize her function  Plan: Continue per plan of care  Progress treatment as tolerated

## 2018-07-12 NOTE — TELEPHONE ENCOUNTER
DR Omkar Gomes DROPPED OFF FORMS ([employer]) TO BE FILLED OUT  HOWEVER, WE ARE NO LONGER LISTED AS PATIENTS PCP  SOMEONE STATED THEY THOUGHT THE PATIENT HAD LEFT THE PRACTICE AND WAS SEEING ANOTHER DOCTOR  I DO NOT KNOW IF THAT IS DOCUMENTED IN HER CHART  I DID LEAVE THE FOLDER FROM [employer] FOR YOU  IF WE FILL THEM OUT THEY WANT A CALL WHEN COMPLETE [phone]  IF PT IS NOT OURS SHOULD WE TELL THEM THAT WE CAN NOT FILL THESE OUT? PLEASE ADVISE  SEE YOUR FOLDER

## 2018-07-13 NOTE — TELEPHONE ENCOUNTER
I put the forms in your folder they said these are from March and need to be filled out    Please advise

## 2018-07-17 ENCOUNTER — APPOINTMENT (OUTPATIENT)
Dept: PHYSICAL THERAPY | Facility: CLINIC | Age: 63
End: 2018-07-17
Payer: COMMERCIAL

## 2018-07-19 ENCOUNTER — OFFICE VISIT (OUTPATIENT)
Dept: PHYSICAL THERAPY | Facility: CLINIC | Age: 63
End: 2018-07-19
Payer: COMMERCIAL

## 2018-07-19 DIAGNOSIS — R53.1 WEAKNESS GENERALIZED: Primary | ICD-10-CM

## 2018-07-19 PROCEDURE — G8978 MOBILITY CURRENT STATUS: HCPCS

## 2018-07-19 PROCEDURE — 97112 NEUROMUSCULAR REEDUCATION: CPT

## 2018-07-19 PROCEDURE — G8979 MOBILITY GOAL STATUS: HCPCS

## 2018-07-19 NOTE — PROGRESS NOTES
PT Re-Evaluation /Progress Note    Today's date: 2018  Patient name: Rosemary Forbes  : 1955  MRN: [id number]  Referring provider: Sumit Bond MD  Dx:   Encounter Diagnosis     ICD-10-CM    1  Weakness generalized R53 1        Start Time: 1030  Stop Time: 1115  Total time in clinic (min): 45 minutes    Assessment  Impairments: abnormal coordination, abnormal gait, abnormal movement, activity intolerance, impaired balance, impaired physical strength, pain with function, safety issue, poor posture  and poor body mechanics  Other impairment: Potential Conversion Disorder    Assessment details: Patient is a 65year old female reporting to skilled PT for balance and gait deficits secondary to reports of leg weakness and apparent neurological deficits  Patient has decreased her fall risk stratification with the DGI, FGA, TUG, and gait speed, which can be attributed to her self- reported improvements in confidence  Patient's endurance limited via the 6 minute walk test, improvements in distance made but still limited in endurance  MMT indicates decreases in LE strength  Patient's transfer capabilities improving, able to complete sit to stands without UE support  Functional mobility improving with steps, static and dynamic balance limited via mCTSIB and higher level dynamic balance testing via FGA and DGI  Patient requires skilled PT to maximize function and to improve her confidence with activity  Understanding of Dx/Px/POC: good   Prognosis: good  Prognosis details: Short Term Goals:   -Patient will improve their 5 time sit to stand score by 10 seconds or more in 4 weeks in order to improve their transfer capabilities  -Met   -Patient will complete a full 6 minute walk test in 4 weeks to gain an in depth assessment of cardiovascular endurance, improve distance by 100 feet or more  - met  -Patient will complete an FGA in 4 weeks to gain a more in depth via of his dynamic balance capabilities   - met  -Patient will improve their DGI score by 3 points or more in 4 weeks in order to improve their dynamic balance capabilities  -Met  -Patient will improve their Feet Together eyes open on firm surface by 5 seconds to improve their balance in the dark  - Met    Long Term Goals:   -Patient will decrease their fall risk in 2/4 fall risk tests in 8 weeks in order to decrease their fall risk stratification  -Met  -Patient will improve their mCTSIB feet together eyes closed on foam time to 5 seconds or more in 8 weeks to improve their balance on compliant surfaces  - Met  -Patient will ascend and descend step with no UE in 8 weeks in order to improve her confidence with stair negotiation to improve her community mobility  -Partially Met    Plan  Planned modality interventions: biofeedback and TENS  Planned therapy interventions: abdominal trunk stabilization, activity modification, ADL retraining, ADL training, IADL retraining, joint mobilization, manual therapy, muscle pump exercises, motor coordination training, neuromuscular re-education, postural training, patient education, balance, balance/weight bearing training, behavior modification, body mechanics training, breathing training, compression, coordination, strengthening, stretching, therapeutic activities, therapeutic exercise, therapeutic training, transfer training, home exercise program, graded motor, graded exercise, graded activity, gait training, functional ROM exercises and flexibility  Other planned therapy interventions: CPT Codes: 03131, 24068, [id number], 64586, 93389, , 27651, [id number], [id number]  Frequency: 2x week  Duration in visits: 16  Duration in weeks: 8  Treatment plan discussed with: patient  Plan details: POC Certification Dates:   6/12/2018-8/7/2018        Subjective Evaluation    History of Present Illness  Date of onset: 2/1/2018  Mechanism of injury: Patient is a 65year old female reporting to skilled PT for gait dysfunction   Patient notes that overall her function is improving since beginning physical therapy, notes less drifting with walking  Continuing to struggle with endurance as well as steps ascending and descending more than 1 flight of steps  Patient feels that she is not "limiting myself as much"  Recurrent probem    Quality of life: excellent    Pain  Current pain ratin  At best pain ratin  At worst pain rating: 10  Location: Bilateral LEs  Quality: dull ache    Social Support  Steps to enter house: yes  1  Stairs in house: yes   12  Lives in: multiple-level home  Lives with: spouse    Employment status: working (Part Time desk work )  Hand dominance: right      Diagnostic Tests    FCE comments: See Medical History Treatments  Discharged from (in last 30 days): home health care  Patient Goals  Patient goals for therapy: return to work, increased motion, improved balance, decreased pain, increased strength, independence with ADLs/IADLs and return to sport/leisure activities  Patient goal: Patient's goals are to improve her balance and walking ability           Objective     Static Posture     Comments  MCTSIB:  -Feet Together eyes open on firm- 8 09 seconds  -Feet Together eyes closed on firm- 2 seconds  -Feet Together eyes open on foam- 0 seconds  -Feet Together eyes closed on foam- 0 Seconds     MCTSIB: Re-evaluation   -Feet Together eyes open on firm- 30 seconds  -Feet Together eyes closed on firm- 15 seconds  -Feet Together eyes open on foam- 16 seconds  -Feet Together eyes closed on foam- 13 seconds       Strength/Myotome Testing     Left Hip   Planes of Motion   Flexion: 4-  Extension: 4-  Abduction: 4-  Adduction: 4    Right Hip   Planes of Motion   Flexion: 4-  Extension: 4-  Abduction: 4-  Adduction: 4    Left Knee   Flexion: 4-  Extension: 4    Right Knee   Flexion: 4-  Extension: 4    Left Ankle/Foot   Dorsiflexion: 4-  Plantar flexion: 4+    Right Ankle/Foot   Dorsiflexion: 4-  Plantar flexion: 4+    Ambulation Comments   Gait Speed- 10 meters/8 19 seconds= 1 22 meters/second  TUG Test- 10 52 seconds  6 minute walk test- 5 minutes and 11 seconds completed- 845 feet    Re-Evaluation: 7/19/2018    Gait Speed- 10 meters/ 6 89 seconds= 1 45 meters/second  TUG Test- 6 63 seconds  6 minute walk test- 1040 feet    Functional Assessment     Comments  30 second stand test- 4 reps  5 times sit to stand- 35 seconds     Ascending and Descending Step- Required 2 UE support, attempt without UE caused increased anxiety    Re-Evaluation: 7/19/2018  30 second stand test- 10  reps  5 times sit to stand- 14 23 seconds     Functional Gait Assessment (< 23/30: Fall Risk)    1  Gait Level Surface: 3  2  Change in Gait Speed: 3   3  Gait with Horizontal Head Turns: 2  4  Gait with Vertical Head Turns: 2  5  Gait Pivot and Turn: 3  6  Step Over Obstacle: 3  7  Gait with Narrow Base of Support: 2  8  Gait with Eyes Closed: 2  9   Ambulating Backwards: 2  10: Steps: 2    Score: 24/30    DGI- 21/24  Flowsheet Rows      Most Recent Value   PT/OT G-Codes   Current Score  60   Projected Score  67   FOTO information reviewed  Yes   Assessment Type  Re-evaluation   G code set  Mobility: Walking & Moving Around   Mobility: Walking and Moving Around Current Status ()  CJ   Mobility: Walking and Moving Around Goal Status ()  CI          Precautions: Fall Risk   Daily Treatment Diary     Manual                                                                                   Exercise Diary  7/19            Neurological Assessment 30'                                                                                                                                                                                                                                                                       Modalities

## 2018-07-24 ENCOUNTER — OFFICE VISIT (OUTPATIENT)
Dept: PHYSICAL THERAPY | Facility: CLINIC | Age: 63
End: 2018-07-24
Payer: COMMERCIAL

## 2018-07-24 DIAGNOSIS — R53.1 WEAKNESS GENERALIZED: Primary | ICD-10-CM

## 2018-07-24 PROCEDURE — 97530 THERAPEUTIC ACTIVITIES: CPT

## 2018-07-24 PROCEDURE — 97112 NEUROMUSCULAR REEDUCATION: CPT

## 2018-07-24 NOTE — PROGRESS NOTES
Daily Note     Today's date: 2018  Patient name: Jennifer Joel  : 1955  MRN: [id number]  Referring provider: Karo Cunningham MD  Dx:   Encounter Diagnosis     ICD-10-CM    1  Weakness generalized R53 1        Start Time: 1100  Stop Time: 1145  Total time in clinic (min): 45 minutes    Subjective: Pt reports no difficulties since last appointment, notes that she is experiencing increased neuropathic symptoms bilaterally, which she attributes to a decrease in neuropathic medication gabapentin, advised to call doctor to address the medication change         Objective: See treatment diary below  Precautions Fall Risk    Specialty Daily Treatment Diary     Manual                                                     Exercise Diary  18   Sit to stands with airex pad on seat, No UE   20 reps  2 sets, No UE 25 reps, no UE    Heel Raised, 2 UE support   1 UE, 20 reps  3 sec hold     Toe Raises, 2 UE support   1 UE, 20 reps  3 sec hold     Hip Flexion        Hip Abduction        Hip Extension        Hurdles in Parallel bars- Forwards     5 cycles outside of parallel bars, no UE, 6" hurdles    Hurdles in Parallel bars- Sideways     5 cycles outside of parallel bars, no UE, 6" hurdles    Hurdles in Parallel bars- Backwards     5 cycles outside of parallel bars, no UE, 6" hurdles    Ambulation over Foam pads- Forward   5 cycles in parallel bars, 2 UE support, 3 consecutive pads     Ambulation over Foam pads- Sideways        Step Ups and Step Downs forward over raised surface, 2 UE support   0-1 UE support  4" step  12 cycles     Step Ups and Step Downs forward over raised surface, 0 UE support        Carrying 10 lb med ball to simulate carrying laundry while ambulating         Reaching for cones, standing FT on Foam, Rotational Component        Lifting Mechanics with med ball         Calf stretches at counter   20 sec bilat   3 reps each      sidelying glut med clamshell    R, L 10 each  No increase in pain        minisquats    Mirror feedback and TC  10 reps      Seated Hip Flexion over physioball (green)    20 reps seated no UE    Seated hamstring stretch     Performed on 8" raised block, 30 second hold, 3 sets bilaterally     Ambulation over uneven surfaces, simulated with foam beams underneath mat- forward    5 cycles, 2 UE support    Ambulation over uneven surfaces, simulated with foam beams underneath mat- Sideways    5 cycles, 2 UE support    Ambulation over uneven surfaces, simulated with foam beams underneath mat- Backward    5 cycles, 2 UE support    Forward ambulation step up and down onto bosu ball- Forward    2 UE support, 10 cycles stepping up and stepping down, 3 second hold at the top    Forward ambulation step up and down onto bosu ball- Sideways    2 UE support, 10 cycles stepping up and stepping down, 3 second hold at the top    Curb step no UE support    5 reps    Stepping outside of JEAN-CLAUDE with colored discs-Forwards     25 steps placed outside JEAN-CLAUDE   Stepping outside of JEAN-CLAUDE with colored discs- Sideways     25 steps placed outside JEAN-CLAUDE   FT cone rotations stacked on raised surfaces     15 cones, performed 2 sets   FT cone rotations stacked on raised surfaces     15 cones, performed 2 sets   SLS in corner 10 sets, 20 second holds, 21 second max hold    10 sets, 10 second holds   Ambulation outside- over grass, on side walks, increased curb heights, steps 15'       Ladder Ball Single Leg stance 10' with cues for core control       Side stepping on foam beam No UE, 6 cycles in parallel bars       Tandem stance on foam beam No UE, 6 cycles in parallel bars       Ascending and Descending flights of steps 10 steps= 1 flight; 8 flights of steps; no UE on hand rail       Stepping over cones- Laterally  No UE, 20 feet, 6 cycles      Stepping over cones- Forward  No UE, 20 feet, 6 cycles      Ambulation over hurdles no UE- Forward  20 feet, 6 cycles, no UE, 9"      Ambulation over hurdles no UE- Sideways  20 feet, 6 cycles, no UE, 9"      Ambulation over hurdles no UE- Backwards  20 feet, 6 cycles, no UE, 9"      Raised Step Taps- Forward, no UE  20 reps, 3 second holds, completed bilaterally       Raised Step Taps- Sideways no UE  20 reps, 3 second holds, completed bilaterally                                                  Modalities                                      Assessment: Patient tolerated treatment well, able to progress her balance capabilities to no UE support, indicating improvements in her confidence and balance capabilities  Patient has troubles with Single leg stance activities, specifically with lateral step taps, indicating higher level static balance deficits  Patient is progressing nicely through her POC today, notes deficits with confidence when she has slight difficulties with balance  Patient has a tendency to circumduct over obstacles due to fear of single limb stance  Patient requires skilled PT to improve and maximize her function  Plan: Continue per plan of care  Progress treatment as tolerated

## 2018-07-26 ENCOUNTER — OFFICE VISIT (OUTPATIENT)
Dept: PHYSICAL THERAPY | Facility: CLINIC | Age: 63
End: 2018-07-26
Payer: COMMERCIAL

## 2018-07-26 DIAGNOSIS — R53.1 WEAKNESS GENERALIZED: Primary | ICD-10-CM

## 2018-07-26 PROCEDURE — 97530 THERAPEUTIC ACTIVITIES: CPT

## 2018-07-26 PROCEDURE — G8979 MOBILITY GOAL STATUS: HCPCS

## 2018-07-26 PROCEDURE — G8980 MOBILITY D/C STATUS: HCPCS

## 2018-07-26 NOTE — PROGRESS NOTES
Daily Note     Today's date: 2018  Patient name: Sonia Shepherd  : 1955  MRN: [id number]  Referring provider: Alexa Walton MD  Dx:   Encounter Diagnosis     ICD-10-CM    1  Weakness generalized R53 1        Start Time: 1115  Stop Time: 1200  Total time in clinic (min): 45 minutes    Subjective: Pt reports no difficulties since last appointment, notes slight bilateral hip soreness from her last appointment, 1/10 soreness        Objective: See treatment diary below  Precautions Fall Risk    Specialty Daily Treatment Diary     Manual                                                     Exercise Diary  2018   Sit to stands with airex pad on seat, No UE    25 reps, no UE    Heel Raised, 2 UE support        Toe Raises, 2 UE support        Hip Flexion        Hip Abduction        Hip Extension        Hurdles in Parallel bars- Forwards     5 cycles outside of parallel bars, no UE, 6" hurdles    Hurdles in Parallel bars- Sideways     5 cycles outside of parallel bars, no UE, 6" hurdles    Hurdles in Parallel bars- Backwards     5 cycles outside of parallel bars, no UE, 6" hurdles    Ambulation over Foam pads- Forward        Ambulation over Foam pads- Sideways        Step Ups and Step Downs forward over raised surface, 2 UE support        Step Ups and Step Downs forward over raised surface, 0 UE support        Carrying 10 lb med ball to simulate carrying laundry while ambulating         Reaching for cones, standing FT on Foam, Rotational Component        Lifting Mechanics with med ball         Calf stretches at counter        sidelying glut med clamshell         minisquats         Seated Hip Flexion over physioball (green)    20 reps seated no UE    Seated hamstring stretch     Performed on 8" raised block, 30 second hold, 3 sets bilaterally     Ambulation over uneven surfaces, simulated with foam beams underneath mat- forward    5 cycles, 2 UE support    Ambulation over uneven surfaces, simulated with foam beams underneath mat- Sideways    5 cycles, 2 UE support    Ambulation over uneven surfaces, simulated with foam beams underneath mat- Backward    5 cycles, 2 UE support    Forward ambulation step up and down onto bosu ball- Forward    2 UE support, 10 cycles stepping up and stepping down, 3 second hold at the top    Forward ambulation step up and down onto bosu ball- Sideways    2 UE support, 10 cycles stepping up and stepping down, 3 second hold at the top    Curb step no UE support    5 reps    Stepping outside of JEAN-CLAUDE with colored discs-Forwards     25 steps placed outside JEAN-CLAUDE   Stepping outside of JEAN-CLAUDE with colored discs- Sideways     25 steps placed outside JEAN-CLAUDE   FT cone rotations stacked on raised surfaces     15 cones, performed 2 sets   FT cone rotations stacked on raised surfaces     15 cones, performed 2 sets   SLS in corner 10 sets, 20 second holds, 21 second max hold    10 sets, 10 second holds   Ambulation outside- over grass, on side walks, increased curb heights, steps 15'       Ladder Ball Single Leg stance 10' with cues for core control       Side stepping on foam beam No UE, 6 cycles in parallel bars       Tandem stance on foam beam No UE, 6 cycles in parallel bars       Ascending and Descending flights of steps 10 steps= 1 flight; 8 flights of steps; no UE on hand rail       Stepping over cones- Laterally  No UE, 20 feet, 6 cycles No UE, 20 feet, 6 cycles     Stepping over cones- Forward  No UE, 20 feet, 6 cycles No UE, 20 feet, 6 cycles     Ambulation over hurdles no UE- Forward  20 feet, 6 cycles, no UE, 9" 20 feet, 10 cycles, no UE, 9"     Ambulation over hurdles no UE- Sideways  20 feet, 6 cycles, no UE, 9" 20 feet, 10 cycles, no UE, 9"     Ambulation over hurdles no UE- Backwards  20 feet, 6 cycles, no UE, 9" 20 feet, 10 cycles, no UE, 9"     Raised Step Taps- Forward, no UE  20 reps, 3 second holds, completed bilaterally  20 reps, 3 second holds, completed bilaterally, 8" Step     Raised Step Taps- Sideways no UE  20 reps, 3 second holds, completed bilaterally 20 reps, 3 second holds, completed bilaterally, 8" Step     Tandem Walking   20 feet, 10 cycles      Braiding Walking   20 feet, 10 cycles                                  Modalities                                      Assessment: Patient tolerated treatment well, able to progress heights to step over today with better neuromuscular control and no loss of balance, reducing UE support when necessary  Patient notes that she is improving her confidence as well with negotiating raised objects  Patient continues to demonstrate tremors with single leg stance holds forward and sideways  Patient notes increased anxiety with raised surfaces  Patient requires skilled PT to improve and maximize her function  Plan: Continue per plan of care  Progress treatment as tolerated

## 2018-07-30 ENCOUNTER — APPOINTMENT (OUTPATIENT)
Dept: PHYSICAL THERAPY | Facility: CLINIC | Age: 63
End: 2018-07-30
Payer: COMMERCIAL

## 2018-08-21 NOTE — PROGRESS NOTES
Self- Discharge     Patient requested to be self discharged from PT due to improvements physical function  Patient informed to call clinic with any questions  Patient will be discharged from skilled PT at this time

## 2018-09-13 ENCOUNTER — TRANSCRIBE ORDERS (OUTPATIENT)
Dept: PHYSICAL THERAPY | Facility: CLINIC | Age: 63
End: 2018-09-13

## 2018-09-13 ENCOUNTER — EVALUATION (OUTPATIENT)
Dept: PHYSICAL THERAPY | Facility: CLINIC | Age: 63
End: 2018-09-13
Payer: COMMERCIAL

## 2018-09-13 DIAGNOSIS — R27.0 ATAXIA: Primary | ICD-10-CM

## 2018-09-13 PROCEDURE — G8979 MOBILITY GOAL STATUS: HCPCS

## 2018-09-13 PROCEDURE — 97163 PT EVAL HIGH COMPLEX 45 MIN: CPT

## 2018-09-13 PROCEDURE — G8978 MOBILITY CURRENT STATUS: HCPCS

## 2018-09-13 NOTE — PROGRESS NOTES
PT Evaluation     Today's date: 2018  Patient name: Octavio Mcduffie  : 1955  MRN: [id number]  Referring provider: Ollie Burton  Dx:   Encounter Diagnosis     ICD-10-CM    1  Ataxia R27 0        Start Time: 0800  Stop Time: 845  Total time in clinic (min): 45 minutes    Assessment  Impairments: abnormal coordination, abnormal gait, abnormal movement, activity intolerance, impaired balance, impaired physical strength, pain with function, safety issue, poor posture  and poor body mechanics  Other impairment: Potential Conversion Disorder    Assessment details: Patient is a 65year old female reporting to skilled PT for balance and gait deficits secondary to reports of leg weakness and apparent neurological deficits, has since manifested itself to bilateral LE Ataxia and increased intension tremoring  Patient presents as a fall risk via the DGI, TUG, and Gait speed, and 5x sit to stand  Patient's fall risk could be due to her ataxic movements   Patient's endurance limited via the 6 minute walk test as well as 30 second sit to stand test   MMT indicates decreases in LE strength, which may be associated with ataxic movements as well  Patient's transfer capabilities limited due to LE weakness and unable to complete full sit to stand without UE support, see 5x sit to stand with ataxic movements  When patient was asked to perform steps, patient became very anxious and presented with intention tremors, indicating a potential increase in anxiety increasing her symptoms  Patient's symptoms are consistent with a potential original conversion disorder diagnosis from earlier in the year, tremors and ataxia increases with anxiety  Patient does present with cerebellar origins of ataxia with testing  Patient requires skilled PT to maximize function and to improve her confidence with activity       Understanding of Dx/Px/POC: good   Prognosis: good  Prognosis details: Short Term Goals:   -Patient will improve their 5 time sit to stand score by 10 seconds or more in 4 weeks in order to improve their transfer capabilities    -Patient will improve her 6 minute walk test distance by 100 feet or more in 4 weeks to improve cardiovascular endurance    -Patient will complete an FGA in 4 weeks to gain a more in depth via of his dynamic balance capabilities    -Patient will improve their DGI score by 3 points or more in 4 weeks in order to improve their dynamic balance capabilities    -Patient will improve their Feet Together eyes open on firm surface by 5 seconds to improve their balance in the dark  Long Term Goals:   -Patient will decrease their fall risk in 2/4 fall risk tests in 8 weeks in order to decrease their fall risk stratification    -Patient will improve their mCTSIB feet together eyes closed on foam time to 5 seconds or more in 8 weeks to improve their balance on compliant surfaces    -Patient will ascend and descend step with no UE in 8 weeks in order to improve her confidence with stair negotiation to improve her community mobility       Plan  Planned modality interventions: biofeedback and TENS  Planned therapy interventions: abdominal trunk stabilization, activity modification, ADL retraining, ADL training, IADL retraining, joint mobilization, manual therapy, muscle pump exercises, motor coordination training, neuromuscular re-education, postural training, patient education, balance, balance/weight bearing training, behavior modification, body mechanics training, breathing training, compression, coordination, strengthening, stretching, therapeutic activities, therapeutic exercise, therapeutic training, transfer training, home exercise program, graded motor, graded exercise, graded activity, gait training, functional ROM exercises and flexibility  Other planned therapy interventions: CPT Codes: 69352, 83696, [id number], 94637, 27832, , 64968, [id number], 02038  Frequency: 2x week  Duration in weeks: 12  Plan of Care beginning date: 2018  Plan of Care expiration date: 2018  Treatment plan discussed with: patient        Subjective Evaluation    History of Present Illness  Date of onset: 2018  Mechanism of injury: Patient is a 65year old female reporting to skilled PT for ataxia  Patient was previously seen in this clinic for gait dysfunction after a traumatic event at a wedding  Patient has recently developed a new symptom of bilateral lower extremity ataxia for approximately a month with an insidious onset  Patient has an EEG, sleep study, as well as a follow up MRI scheduled in the upcoming weeks  Patient notes that the most ataxic symptoms are in the R LE > L LE  Patient notes the ataxia decreases her balance capabilities  No falls reported  Patient reports numbness and tingling occasionally in the LE  Recurrent probem    Quality of life: excellent    Pain  Current pain rating: 3  At best pain rating: 3  At worst pain ratin  Location: Bilateral LEs  Quality: dull ache    Social Support  Steps to enter house: yes  1  Stairs in house: yes   12  Lives in: multiple-level home  Lives with: spouse    Employment status: working (Part Time desk work )  Hand dominance: right      Diagnostic Tests    FCE comments: See Medical History Treatments  Previous treatment: physical therapy  Patient Goals  Patient goals for therapy: return to work, increased motion, improved balance, decreased pain, increased strength, independence with ADLs/IADLs and return to sport/leisure activities  Patient goal: Patient's goals are to improve her balance and walking ability           Objective     Static Posture     Comments  MCTSIB:  -Feet Together eyes open on firm- 22 70 seconds  -Feet Together eyes closed on firm- 5 seconds  -Feet Together eyes open on foam- 0 seconds  -Feet Together eyes closed on foam- 0 Seconds      Strength/Myotome Testing     Left Hip   Planes of Motion   Flexion: 4-  Extension: 4-  Abduction: 4-  Adduction: 4-    Right Hip   Planes of Motion   Flexion: 4-  Extension: 4-  Abduction: 4-  Adduction: 4-    Left Knee   Flexion: 3+  Extension: 4-    Right Knee   Flexion: 3+  Extension: 4-    Left Ankle/Foot   Dorsiflexion: 3+  Plantar flexion: 4-    Right Ankle/Foot   Dorsiflexion: 3+  Plantar flexion: 4-    Additional Strength Details  Noted ataxic movements and intention tremors with trying to initiate movements    Ambulation     Comments   Gait Speed- 10 meters/ 11 24 seconds=  89 meters/second  TUG Test-  16 20 seconds  6 minute walk test- 900 feet    Functional Assessment     Comments  30 second stand test- 6 reps  5 times sit to stand- 23 05 seconds     Ascending and Descending Step- Required 1 UE support, attempt without UE caused increased tremoring      Flowsheet Rows      Most Recent Value   PT/OT G-Codes   Current Score  36   Projected Score  49   FOTO information reviewed  Yes   Assessment Type  Evaluation   G code set  Mobility: Walking & Moving Around   Mobility: Walking and Moving Around Current Status ()  CK   Mobility: Walking and Moving Around Goal Status ()  CJ          Precautions: Fall Risk, Tremor,   Past Medical History:   Diagnosis Date    Anesthesia complication     difficult to wake up    GERD (gastroesophageal reflux disease)     Lazy eye     resolved: 3/27/17    Osteopenia     with joint pain-elevated DEV    Tinnitus     Wears glasses     for reading   DGI 13/24    Daily Treatment Diary     Manual                                                                                   Exercise Diary              Hip Flexion             Hip Abduction             Hip Extension                                                                                                                                                                                                                                              Modalities

## 2018-09-13 NOTE — LETTER
2018    Annalisa Has  [address] [facility]  2nd  [facility] [address] [place]    Patient: Monet Valle   YOB: 1955   Date of Visit: 2018     Encounter Diagnosis     ICD-10-CM    1  Ataxia R27 0        Dear Dr Hooper Ni:    Please review the attached Plan of Care from [facility] recent visit  Please verify that you agree therapy should continue by signing the attached document and sending it back to our office  If you have any questions or concerns, please don't hesitate to call  Sincerely,    Dimple Amaya, PT      Referring Provider:      I certify that I have read the below Plan of Care and certify the need for these services furnished under this plan of treatment while under my care  Annalisa Has  [address]  [address] [place] [id number]  VIA Facsimile: [phone]          PT Evaluation     Today's date: 2018  Patient name: Monet Valle  : 1955  MRN: [id number]  Referring provider: Kong Doshi  Dx:   Encounter Diagnosis     ICD-10-CM    1  Ataxia R27 0        Start Time: 0800  Stop Time: 08  Total time in clinic (min): 45 minutes    Assessment  Impairments: abnormal coordination, abnormal gait, abnormal movement, activity intolerance, impaired balance, impaired physical strength, pain with function, safety issue, poor posture  and poor body mechanics  Other impairment: Potential Conversion Disorder    Assessment details: Patient is a 65year old female reporting to skilled PT for balance and gait deficits secondary to reports of leg weakness and apparent neurological deficits, has since manifested itself to bilateral LE Ataxia and increased intension tremoring  Patient presents as a fall risk via the DGI, TUG, and Gait speed, and 5x sit to stand  Patient's fall risk could be due to her ataxic movements    Patient's endurance limited via the 6 minute walk test as well as 30 second sit to stand test   MMT indicates decreases in LE strength, which may be associated with ataxic movements as well  Patient's transfer capabilities limited due to LE weakness and unable to complete full sit to stand without UE support, see 5x sit to stand with ataxic movements  When patient was asked to perform steps, patient became very anxious and presented with intention tremors, indicating a potential increase in anxiety increasing her symptoms  Patient's symptoms are consistent with a potential original conversion disorder diagnosis from earlier in the year, tremors and ataxia increases with anxiety  Patient does present with cerebellar origins of ataxia with testing  Patient requires skilled PT to maximize function and to improve her confidence with activity  Understanding of Dx/Px/POC: good   Prognosis: good  Prognosis details: Short Term Goals:   -Patient will improve their 5 time sit to stand score by 10 seconds or more in 4 weeks in order to improve their transfer capabilities    -Patient will improve her 6 minute walk test distance by 100 feet or more in 4 weeks to improve cardiovascular endurance    -Patient will complete an FGA in 4 weeks to gain a more in depth via of his dynamic balance capabilities    -Patient will improve their DGI score by 3 points or more in 4 weeks in order to improve their dynamic balance capabilities    -Patient will improve their Feet Together eyes open on firm surface by 5 seconds to improve their balance in the dark  Long Term Goals:   -Patient will decrease their fall risk in 2/4 fall risk tests in 8 weeks in order to decrease their fall risk stratification    -Patient will improve their mCTSIB feet together eyes closed on foam time to 5 seconds or more in 8 weeks to improve their balance on compliant surfaces    -Patient will ascend and descend step with no UE in 8 weeks in order to improve her confidence with stair negotiation to improve her community mobility       Plan  Planned modality interventions: biofeedback and TENS  Planned therapy interventions: abdominal trunk stabilization, activity modification, ADL retraining, ADL training, IADL retraining, joint mobilization, manual therapy, muscle pump exercises, motor coordination training, neuromuscular re-education, postural training, patient education, balance, balance/weight bearing training, behavior modification, body mechanics training, breathing training, compression, coordination, strengthening, stretching, therapeutic activities, therapeutic exercise, therapeutic training, transfer training, home exercise program, graded motor, graded exercise, graded activity, gait training, functional ROM exercises and flexibility  Other planned therapy interventions: CPT Codes: [address] [facility], [address] [place] [address], [id number], 59163, 23852, , [id number], [id number], 72129  Frequency: 2x week  Duration in weeks: 12  Plan of Care beginning date: 2018  Plan of Care expiration date: 2018  Treatment plan discussed with: patient        Subjective Evaluation    History of Present Illness  Date of onset: 2018  Mechanism of injury: Patient is a 65year old female reporting to skilled PT for ataxia  Patient was previously seen in this clinic for gait dysfunction after a traumatic event at a wedding  Patient has recently developed a new symptom of bilateral lower extremity ataxia for approximately a month with an insidious onset  Patient has an EEG, sleep study, as well as a follow up MRI scheduled in the upcoming weeks  Patient notes that the most ataxic symptoms are in the R LE > L LE  Patient notes the ataxia decreases her balance capabilities  No falls reported  Patient reports numbness and tingling occasionally in the LE     Recurrent probem    Quality of life: excellent    Pain  Current pain rating: 3  At best pain rating: 3  At worst pain ratin  Location: Bilateral LEs  Quality: dull ache    Social Support  Steps to enter house: yes  1  Stairs in house: yes   12  Lives in: multiple-level home  Lives with: spouse    Employment status: working (Part Time desk work )  Hand dominance: right      Diagnostic Tests    FCE comments: See Medical History Treatments  Previous treatment: physical therapy  Patient Goals  Patient goals for therapy: return to work, increased motion, improved balance, decreased pain, increased strength, independence with ADLs/IADLs and return to sport/leisure activities  Patient goal: Patient's goals are to improve her balance and walking ability           Objective     Static Posture     Comments  MCTSIB:  -Feet Together eyes open on firm- 22 70 seconds  -Feet Together eyes closed on firm- 5 seconds  -Feet Together eyes open on foam- 0 seconds  -Feet Together eyes closed on foam- 0 Seconds      Strength/Myotome Testing     Left Hip   Planes of Motion   Flexion: 4-  Extension: 4-  Abduction: 4-  Adduction: 4-    Right Hip   Planes of Motion   Flexion: 4-  Extension: 4-  Abduction: 4-  Adduction: 4-    Left Knee   Flexion: 3+  Extension: 4-    Right Knee   Flexion: 3+  Extension: 4-    Left Ankle/Foot   Dorsiflexion: 3+  Plantar flexion: 4-    Right Ankle/Foot   Dorsiflexion: 3+  Plantar flexion: 4-    Additional Strength Details  Noted ataxic movements and intention tremors with trying to initiate movements    Ambulation     Comments   Gait Speed- 10 meters/ 11 24 seconds=  89 meters/second  TUG Test-  16 20 seconds  6 minute walk test- 900 feet    Functional Assessment     Comments  30 second stand test- 6 reps  5 times sit to stand- 23 05 seconds     Ascending and Descending Step- Required 1 UE support, attempt without UE caused increased tremoring      Flowsheet Rows      Most Recent Value   PT/OT G-Codes   Current Score  36   Projected Score  49   FOTO information reviewed  Yes   Assessment Type  Evaluation   G code set  Mobility: Walking & Moving Around   Mobility: Walking and Moving Around Current Status ()  CK   Mobility: Walking and Moving Around Goal Status ()  CJ Precautions: Fall Risk, Tremor,   Past Medical History:   Diagnosis Date    Anesthesia complication     difficult to wake up    GERD (gastroesophageal reflux disease)     Lazy eye     resolved: 3/27/17    Osteopenia     with joint pain-elevated DEV    Tinnitus     Wears glasses     for reading   DGI 13/24    Daily Treatment Diary     Manual                                                                                   Exercise Diary              Hip Flexion             Hip Abduction             Hip Extension                                                                                                                                                                                                                                              Modalities

## 2018-09-18 ENCOUNTER — TELEPHONE (OUTPATIENT)
Dept: PHYSICAL THERAPY | Facility: CLINIC | Age: 63
End: 2018-09-18

## 2018-09-19 ENCOUNTER — APPOINTMENT (OUTPATIENT)
Dept: PHYSICAL THERAPY | Facility: CLINIC | Age: 63
End: 2018-09-19
Payer: COMMERCIAL

## 2018-09-20 ENCOUNTER — OFFICE VISIT (OUTPATIENT)
Dept: PHYSICAL THERAPY | Facility: CLINIC | Age: 63
End: 2018-09-20
Payer: COMMERCIAL

## 2018-09-20 ENCOUNTER — APPOINTMENT (OUTPATIENT)
Dept: SPEECH THERAPY | Facility: CLINIC | Age: 63
End: 2018-09-20
Payer: COMMERCIAL

## 2018-09-20 DIAGNOSIS — R27.0 ATAXIA: Primary | ICD-10-CM

## 2018-09-20 PROCEDURE — 97112 NEUROMUSCULAR REEDUCATION: CPT

## 2018-09-20 PROCEDURE — 97116 GAIT TRAINING THERAPY: CPT

## 2018-09-20 NOTE — PROGRESS NOTES
Daily Note     Today's date: 2018  Patient name: Leland Brambila  : 1955  MRN: [id number]  Referring provider: Bj Dai MD  Dx:   Encounter Diagnosis     ICD-10-CM    1  Ataxia R27 0        Start Time: 0900  Stop Time: 1000  Total time in clinic (min): 60 minutes    Subjective: Patient reported that she is noticing increased path deviation while she was walking and freezing episodes  Patient was called by her neurologist and stated that the "neurologist thinks it is Parkinson's disease"  She has a follow up scheduled on 10/4         Objective: See treatment diary below  Precautions: Ataxia,   Past Medical History:   Diagnosis Date    Anesthesia complication     difficult to wake up    GERD (gastroesophageal reflux disease)     Lazy eye     resolved: 3/27/17    Osteopenia     with joint pain-elevated DEV    Tinnitus     Wears glasses     for reading         Daily Treatment Diary     Manual                                                                                   Exercise Diary              Limits of Stability in all directions 2 each direction; anterior, posterior, laterally             Ambulation Through Crowded environment 10', through crowded environment, up and down ramps, through doorways            Nu step for tremor and muscle spasm control  10', resistance Lvl 10             Education on freezing with anterior to posterior hip movement 5', 30 reps during exercise             BIG Sit to stand component  15 total reps             BIG Forward Step, large amplitude arm motion 20 reps bilaterally             Rock and Reach large amplitude movements Forward movement, 20 reps bilaterally                                                                                                                                                                                          Modalities                                                       Assessment: Patient initiated POC well today, noting difficulties with coordination and movements today  Patient challenged with ambulation, demonstrating slight festinating gait pattern and lack of arm coordination in which patient gave consent to be recorded on her cellular device to in order to view her gait pattern, patient understood her gait deviations  Patient demonstrates coordination difficulties with rock and reach activities today, which is consistent with her difficulties with coordinating reciprocal arm and leg movements with ambulation  Patient demonstrated success with BIG sit to stand motions with large amplitude movements  Patient requires skilled PT to maximize function as well as improve noted deficits in evaluation and session today  Plan: Continue per plan of care  Progress treatment as tolerated

## 2018-09-25 ENCOUNTER — OFFICE VISIT (OUTPATIENT)
Dept: PHYSICAL THERAPY | Facility: CLINIC | Age: 63
End: 2018-09-25
Payer: COMMERCIAL

## 2018-09-25 DIAGNOSIS — R27.0 ATAXIA: Primary | ICD-10-CM

## 2018-09-25 PROCEDURE — 97530 THERAPEUTIC ACTIVITIES: CPT

## 2018-09-25 PROCEDURE — 97112 NEUROMUSCULAR REEDUCATION: CPT

## 2018-09-25 NOTE — PROGRESS NOTES
Daily Note     Today's date: 2018  Patient name: Shweta Duran  : 1955  MRN: [id number]  Referring provider: Micheal Ballard MD  Dx:   Encounter Diagnosis     ICD-10-CM    1  Ataxia R27 0        Start Time: 1000  Stop Time: 1100  Total time in clinic (min): 60 minutes    Subjective: Patient reported that she is feeling good, states that she continues to have difficulties getting out of chairs  No issues since last appointment         Objective: See treatment diary below  Precautions: Ataxia,   Past Medical History:   Diagnosis Date    Anesthesia complication     difficult to wake up    GERD (gastroesophageal reflux disease)     Lazy eye     resolved: 3/27/17    Osteopenia     with joint pain-elevated DEV    Tinnitus     Wears glasses     for reading         Daily Treatment Diary     Manual                                                                                   Exercise Diary              Limits of Stability in all directions 2 each direction; anterior, posterior, laterally             Ambulation Through Crowded environment 10', through crowded environment, up and down ramps, through doorways            Nu step for tremor and muscle spasm control  10', resistance Lvl 10             Education on freezing with anterior to posterior hip movement 5', 30 reps during exercise             BIG Sit to stand component  15 total reps  20 total reps with breathing component           BIG Forward Step, large amplitude arm motion 20 reps bilaterally  20 reps bilaterally            Rock and Reach large amplitude movements Forward movement, 20 reps bilaterally  Forward movement, 20 reps bilaterally            BIG Sideways Step, large amplitude arm motion  20 reps bilaterally            BIG Backward Step, large amplitude arm motion  20 reps bilaterally            BIG Twist and Reach  20 reps bilaterally            Ascending and Descending Steps- Forward   4" and 8" steps today, 10 cycles Gastroc Stretch off Step  30 seconds, 3 sets bilaterally                                                                                                                        Modalities                                                       Assessment: Patient progressed POC well today, able to improve her carryover from sit to stand from last session, stated performance at home and noted less momentum loss  Patient has difficulties with maintaining coordination and when freezing episode, demonstrated carryover with freezing reduction  Patient struggled with posterior/backwards stepping with coordination of movements, and challenged with limits of stability in the posterior direction  Patient can perform movements out of sequence  Patient continues to struggle with coordination with opposite arm and leg movements, which is revealed through step training and coordination rock and reach  [place] with stretch off step, safety verbalized and shown  Patient requires skilled PT to maximize function as well as improve noted deficits in evaluation and session today  Plan: Continue per plan of care  Progress treatment as tolerated

## 2018-09-26 ENCOUNTER — APPOINTMENT (OUTPATIENT)
Dept: SPEECH THERAPY | Facility: CLINIC | Age: 63
End: 2018-09-26
Payer: COMMERCIAL

## 2018-09-26 ENCOUNTER — EVALUATION (OUTPATIENT)
Dept: SPEECH THERAPY | Facility: CLINIC | Age: 63
End: 2018-09-26
Payer: COMMERCIAL

## 2018-09-26 DIAGNOSIS — R48.8 OTHER SYMBOLIC DYSFUNCTIONS: Primary | ICD-10-CM

## 2018-09-26 DIAGNOSIS — R47.01 APHASIA: ICD-10-CM

## 2018-09-26 PROCEDURE — 96125 COGNITIVE TEST BY HC PRO: CPT | Performed by: SPEECH-LANGUAGE PATHOLOGIST

## 2018-09-26 PROCEDURE — G9166 ATTEN GOAL STATUS: HCPCS | Performed by: SPEECH-LANGUAGE PATHOLOGIST

## 2018-09-26 PROCEDURE — G9165 ATTEN CURRENT STATUS: HCPCS | Performed by: SPEECH-LANGUAGE PATHOLOGIST

## 2018-09-26 NOTE — PROGRESS NOTES
Speech Language Pathology Evaluation  Today's date: 2018  Patient name: Rne Luque    : 1955        Referring provider: Loulou Gutierrez MD  Dx:   Encounter Diagnosis     ICD-10-CM    1  Other symbolic dysfunctions G73 9    2  Aphasia R47 01        Start Time: 1464  Stop Time: 1115  Total time in clinic (min): 60 minutes    Subjective Comments: Patient is a 59year old female seen for a cognitive linguistic evaluation secondary to complaints of impaired word finding  Patient reports that it is difficult for her to find words to express her thoughts and ideas  She noted a recent telephone conversation with her sister in which she was unable to recall the word "arm " She notes also that at a recent work meeting, she was unable to recall the names of participants in the meeting  She was hospitalized in 2018 for possible CVA  At that time she reported suffering from left facial droop and left sided weakness  MRI and CT scans were negative  Recently, she has developed ataxic like gait and moderate right leg tremor  Along with leg tremor, patient has noticed more difficulty finding words  She attends PT for this  Patient goal is to be better able to recall names and words  Patient was tearful and emotional at times during the evaluation  She feels frustrated with her current physical and cognitive linguistic decline, the cause of which has yet to be definitively diagnosed - as per patient  Patient admitted to feelings of sadness and depression  Counseling was suggested  Safety Measures: Patient at risk for falls  Patient currently receives PT       Reason for Referral:Change in cognitive status and Decreased language skills  Prior Functional Status:Communication effective and appropriate in all situations  Medical History significant for:   Past Medical History:   Diagnosis Date    Anesthesia complication     difficult to wake up    GERD (gastroesophageal reflux disease)     Lazy eye resolved: 3/27/17    Osteopenia     with joint pain-elevated DEV    Tinnitus     Wears glasses     for reading     Clinically Complex Situations:Mental/behavioral diagnosis affecting rate of recovery    Hearing:Not Tested  Vision:Other Wears eye glasses for reading  Medication List:   Current Outpatient Prescriptions   Medication Sig Dispense Refill    aspirin (ECOTRIN LOW STRENGTH) 81 mg EC tablet Take 1 tablet (81 mg total) by mouth daily  0    atorvastatin (LIPITOR) 40 mg tablet Take 1 tablet (40 mg total) by mouth every evening  0    Brimonidine Tartrate (ALPHAGAN P OP) Apply to eye      calcium citrate-vitamin D (CITRACAL+D) 315-200 MG-UNIT per tablet Take by mouth daily      EPINEPHrine (EPIPEN) 0 3 mg/0 3 mL SOAJ USE AS DIRECTED IN CASE OF SEVERE ALLERGIC REACTION- SHORTNESS OF   (REFER TO PRESCRIPTION NOTES)  0    etodolac (LODINE) 400 MG tablet Take 1 tablet (400 mg total) by mouth 2 (two) times a day for 30 days 60 tablet 0    gabapentin (NEURONTIN) 100 mg capsule Take 1 capsule (100 mg total) by mouth 3 (three) times a day for 30 days 90 capsule 0    melatonin 3 mg Take 3 mg by mouth daily at bedtime      omeprazole (PriLOSEC) 40 MG capsule Take 40 mg by mouth daily before breakfast  0    polyethylene glycol (MIRALAX) 17 g packet Take 17 g by mouth daily as needed (constipation) 14 each 0     No current facility-administered medications for this visit  Allergies:    Allergies   Allergen Reactions    Demerol [Meperidine] Lightheadedness     Reaction Date: 11Jan2006;     Sulfa Antibiotics Hives     Reaction Date: 11Jan2006;      Primary Language: English  Preferred Language: [address] Environment/ Lifestyle:Lives at home with   Current Education status: [employer]  Highest Level of Education: College  Current / Prior Services being received: Physical Therapy    Mental Status: Alert  Behavior Status:Cooperative  Communication Modalities: Verbal  Recent Speech/ Language therapy:None  Rehabilitation Prognosis:Good rehab potential to reach the established goals    Assessments:Cognitive Assessment  Highest Level of Education:Bachelor/Associate's degree  Areas of difficulty:   Memory Remembering people's names and Learning new things and Communication Word finding in conversation and Expressing thoughts and ideas fluently      Standardized testing The Cognitive Linguistic Quick Test (CLQT) is designed to measure an individuals five cognitive domains (attention, memory, executive functions, language, and visuospatial skills)  This norm-referenced tool has been standardized on adults ages 25 through 80years old with neurological impairment as a result of CVA, TBI, or dementia  The following results were obtained during the administration of the assessment  Cognitive Domain: Score: Range of Severity:   Attention 176/215 Mild   Memory 147/185 Mild   Executive Functions 23/40 Mild   Language  29/37 WNL   Visuospatial Skills  78/105 Mild        *Composite Severity Rating: 3 2/4 0  Mild             Clock Drawin/13 WNL     Pt scored below Criteron Cut Scores on the following subtests: Symbol Trails and Mazes  *Patient named 16 concrete category members (animals) in 60 sec (norm=15+)  -- AVERAGE    *Patient named 6 abstract category members (words beginning with letter 'm') in 60 sec (norm=10+)  -- BELOW AVERAGE       Goals  Short Term Goals:  1  Improve attention for linguistic content to 90% accuracy during structured tasks  2  Improve memory for linguistic content to 90% accuracy during structured tasks  3  Improve high level naming skills to 90% accuracy  4  Patient will complete executive function dependent tasks with 90% accuracy or higher  Long Term Goals:1  Improve cognitive linguistic function   2  Improve expressive language skills           Impressions/ Recommendations  Impressions:  Pateint presents with mild cognitive linguistic deficits across multiple cognitive domains  Memory appeared mildly impaired  Recall of spoken narratives was fair  She performed reasonably well on the Story Retelling Subtest of the CLQT (low average range) but omitted details from the middle portion of the narrative  This subtest requires the patient to listen to a spoken narrative and then retell the narrative  Good performance for this task relies, in part, on adequate auditory attention/memory/comprehension and working memory  Visual memory for designs was  impaired as she was able to recall 5/6 designs  Executive function was mildly impaired  Executive function is a set of mental skills which enable the individual to attend, plan, organize and recall details  Executive functions are necessary for gathering of information and structuring it for evaluation  Functionally, patients with deficits in executive function may struggle with organization of tasks, attention, setting priorities, and multitasking  Executive function score was negatively impacted because the patient failed to complete the symbol trails and maze subtests  within the allotted time  Speed of processing for most written tasks seemed slow  Higher level word finding was mildly impaired for abstract generative naming  Generative naming  assesses word recall related to specific semantic and phonological categories (animal naming and words beginning with the letter /m/  This task measures working memory in conjunction with language skills and executive functions  The patient displayed mild  difficulty with abstract (words beginning with /m/) executive word finding  During spontaneous speech, pauses and hesitations were occasionally  noted as the patient searched for words to complete her thoughts and ideas  10/05/2018 Addendum: Patient was hospitalized shortly after this evaluation  Speech therapy was not rendered secondary to hospitalization  As per physical therapy, patient would like all future appointments cancelled  Recommendations:   Patients would benefit from: Speech/ language therapy   Frequency:1-2x weekly   Duration:Other 3 months  Also recommend Neuropsychological evaluation in order to comprehensively assess cognitive and psychological function  Suggest counseling secondary to patient report of depression  Intervention certification QJZS:03/05/7749  Intervention certification JX:21/11/1460  Intervention Comments:Patient participated well with eval process

## 2018-09-26 NOTE — LETTER
2018    Rhonda Briscoe  [address] [facility]  [address] [place] [address] [address] [facility] [place]    Patient: Katie Morrow   YOB: 1955   Date of Visit: 2018     Encounter Diagnosis     ICD-10-CM    1  Other symbolic dysfunctions T03 0    2  Aphasia R47 01        Dear Dr Brooklyn Escobar:    Please review the [facility] from [facility]  Please verify that you agree therapy should continue by signing the attached document and sending it back to our office  If you have any questions or concerns, please don't hesitate to call  Sincerely,    Ben Osorio CCC-SLP      Referring Provider:     Based upon review of the patient's progress and continued therapy plan, it is my medical opinion that Katie Morrwo should continue speech therapy treatment at the Physical Therapy at [address] [place] [address] Street:                    Rhonda Briscoe  [address]  [address] [place] [address] [place]: [phone]                  Speech Language Pathology Evaluation  Today's date: 2018  Patient name: Katie Morrow    : 1955        Referring provider: Jayme Do MD  Dx:   Encounter Diagnosis     ICD-10-CM    1  Other symbolic dysfunctions A03 6    2  Aphasia R47 01        Start Time: 0801  Stop Time: 1115  Total time in clinic (min): 60 minutes    Subjective Comments: Patient is a 59year old female seen for a cognitive linguistic evaluation secondary to complaints of impaired word finding  Patient reports that it is difficult for her to find words to express her thoughts and ideas  She noted a recent telephone conversation with her sister in which she was unable to recall the word "arm " She notes also that at a recent work meeting, she was unable to recall the names of participants in the meeting  She was hospitalized in 2018 for possible CVA  At that time she reported suffering from left facial droop and left sided weakness  MRI and CT scans were negative   Recently, she has developed ataxic like gait and moderate right leg tremor  Along with leg tremor, patient has noticed more difficulty finding words  She attends PT for this  Patient goal is to be better able to recall names and words  Patient was tearful and emotional at times during the evaluation  She feels frustrated with her current physical and cognitive linguistic decline, the cause of which has yet to be definitively diagnosed - as per patient  Patient admitted to feelings of sadness and depression  Counseling was suggested  Safety Measures: Patient at risk for falls  Patient currently receives PT  Reason for Referral:Change in cognitive status and Decreased language skills  Prior Functional Status:Communication effective and appropriate in all situations  Medical History significant for:   Past Medical History:   Diagnosis Date    Anesthesia complication     difficult to wake up    GERD (gastroesophageal reflux disease)     Lazy eye     resolved: 3/27/17    Osteopenia     with joint pain-elevated DEV    Tinnitus     Wears glasses     for reading     Clinically Complex Situations:Mental/behavioral diagnosis affecting rate of recovery    Hearing:Not Tested  Vision:Other Wears eye glasses for reading  Medication List:   Current Outpatient Prescriptions   Medication Sig Dispense Refill    aspirin (ECOTRIN LOW STRENGTH) 81 mg EC tablet Take 1 tablet (81 mg total) by mouth daily  0    atorvastatin (LIPITOR) 40 mg tablet Take 1 tablet (40 mg total) by mouth every evening  0    Brimonidine Tartrate (ALPHAGAN P OP) Apply to eye      calcium citrate-vitamin D (CITRACAL+D) 315-200 MG-UNIT per tablet Take by mouth daily      EPINEPHrine (EPIPEN) 0 3 mg/0 3 mL SOAJ USE AS DIRECTED IN CASE OF SEVERE ALLERGIC REACTION- SHORTNESS OF   (REFER TO PRESCRIPTION NOTES)    0    etodolac (LODINE) 400 MG tablet Take 1 tablet (400 mg total) by mouth 2 (two) times a day for 30 days 60 tablet 0    gabapentin (NEURONTIN) 100 mg capsule Take 1 capsule (100 mg total) by mouth 3 (three) times a day for 30 days 90 capsule 0    melatonin 3 mg Take 3 mg by mouth daily at bedtime      omeprazole (PriLOSEC) 40 MG capsule Take 40 mg by mouth daily before breakfast  0    polyethylene glycol (MIRALAX) 17 g packet Take 17 g by mouth daily as needed (constipation) 14 each 0     No current facility-administered medications for this visit  Allergies: Allergies   Allergen Reactions    Demerol [Meperidine] Lightheadedness     Reaction Date: 11Jan2006;     Sulfa Antibiotics Hives     Reaction Date: 11Jan2006;      Primary Language: English  Preferred Language: English     Home Environment/ Lifestyle:Lives at home with   Current Education status: [employer]  Highest Level of Education: College  Current / Prior Services being received: Physical Therapy    Mental Status: Alert  Behavior Status:Cooperative  Communication Modalities: Verbal  Recent Speech/ Language therapy:None  Rehabilitation Prognosis:Good rehab potential to reach the established goals    Assessments:Cognitive Assessment  Highest Level of Education:Bachelor/Associate's degree  Areas of difficulty:   Memory Remembering people's names and Learning new things and Communication Word finding in conversation and Expressing thoughts and ideas fluently      Standardized testing The Cognitive Linguistic Quick Test (CLQT) is designed to measure an individuals five cognitive domains (attention, memory, executive functions, language, and visuospatial skills)  This norm-referenced tool has been standardized on adults ages 25 through 80years old with neurological impairment as a result of CVA, TBI, or dementia  The following results were obtained during the administration of the assessment       Cognitive Domain: Score: Range of Severity:   Attention 176/215 Mild   Memory 147/185 Mild   Executive Functions 23/40 Mild   Language  29/37 WNL   Visuospatial Skills  78/105 Mild *Composite Severity Rating: 3 2/4 0  Mild             Clock Drawin/13 WNL     Pt scored below Criteron Cut Scores on the following subtests: Symbol Trails and Mazes  *Patient named 16 concrete category members (animals) in 60 sec (norm=15+)  -- AVERAGE    *Patient named 6 abstract category members (words beginning with letter 'm') in 60 sec (norm=10+)  -- BELOW AVERAGE       Goals  Short Term Goals:  1  Improve attention for linguistic content to 90% accuracy during structured tasks  2  Improve memory for linguistic content to 90% accuracy during structured tasks  3  Improve high level naming skills to 90% accuracy  4  Patient will complete executive function dependent tasks with 90% accuracy or higher  Long Term Goals:1  Improve cognitive linguistic function   2  Improve expressive language skills  Impressions/ Recommendations  Impressions:  Pateint presents with mild cognitive linguistic deficits across multiple cognitive domains  Memory appeared mildly impaired  Recall of spoken narratives was fair  She performed reasonably well on the Story Retelling Subtest of the CLQT (low average range) but omitted details from the middle portion of the narrative  This subtest requires the patient to listen to a spoken narrative and then retell the narrative  Good performance for this task relies, in part, on adequate auditory attention/memory/comprehension and working memory  Visual memory for designs was  impaired as she was able to recall 5/6 designs  Executive function was mildly impaired  Executive function is a set of mental skills which enable the individual to attend, plan, organize and recall details  Executive functions are necessary for gathering of information and structuring it for evaluation  Functionally, patients with deficits in executive function may struggle with organization of tasks, attention, setting priorities, and multitasking   Executive function score was negatively impacted because the patient failed to complete the symbol trails and maze subtests  within the allotted time  Speed of processing for most written tasks seemed slow  Higher level word finding was mildly impaired for abstract generative naming  Generative naming  assesses word recall related to specific semantic and phonological categories (animal naming and words beginning with the letter /m/  This task measures working memory in conjunction with language skills and executive functions  The patient displayed mild  difficulty with abstract (words beginning with /m/) executive word finding  During spontaneous speech, pauses and hesitations were occasionally  noted as the patient searched for words to complete her thoughts and ideas  Recommendations:   Patients would benefit from: Speech/ language therapy   Frequency:1-2x weekly   Duration:Other 3 months  Also recommend Neuropsychological evaluation in order to comprehensively assess cognitive and psychological function  Suggest counseling secondary to patient report of depression  Intervention certification KQTN:45/79/4654  Intervention certification FW:29/38/9677  Intervention Comments:Patient participated well with eval process

## 2018-09-27 ENCOUNTER — APPOINTMENT (OUTPATIENT)
Dept: PHYSICAL THERAPY | Facility: CLINIC | Age: 63
End: 2018-09-27
Payer: COMMERCIAL

## 2018-10-04 NOTE — PROGRESS NOTES
Self-Discharge    Patient was hospitalized for an exacerbation of symptoms, requested to remove all appointments and is considered a self-discharge at this time  Patient was in agreement with this decision

## 2018-10-24 PROBLEM — K21.9 GERD (GASTROESOPHAGEAL REFLUX DISEASE): Status: ACTIVE | Noted: 2018-06-04

## 2019-02-06 ENCOUNTER — HOSPITAL ENCOUNTER (OUTPATIENT)
Dept: RADIOLOGY | Facility: HOSPITAL | Age: 64
Discharge: HOME | End: 2019-02-06
Payer: COMMERCIAL

## 2019-02-06 ENCOUNTER — TRANSCRIBE ORDERS (OUTPATIENT)
Dept: ADMINISTRATIVE | Facility: HOSPITAL | Age: 64
End: 2019-02-06

## 2019-02-06 DIAGNOSIS — M47.812 CERVICAL SPONDYLOSIS WITHOUT MYELOPATHY: ICD-10-CM

## 2019-02-06 DIAGNOSIS — M47.812 CERVICAL SPONDYLOSIS WITHOUT MYELOPATHY: Primary | ICD-10-CM

## 2019-02-06 PROCEDURE — 72052 X-RAY EXAM NECK SPINE 6/>VWS: CPT

## 2019-02-11 ENCOUNTER — APPOINTMENT (EMERGENCY)
Dept: RADIOLOGY | Facility: HOSPITAL | Age: 64
End: 2019-02-11
Payer: COMMERCIAL

## 2019-02-11 ENCOUNTER — HOSPITAL ENCOUNTER (EMERGENCY)
Facility: HOSPITAL | Age: 64
Discharge: HOME | End: 2019-02-11
Attending: EMERGENCY MEDICINE | Admitting: EMERGENCY MEDICINE
Payer: COMMERCIAL

## 2019-02-11 VITALS
BODY MASS INDEX: 26.63 KG/M2 | HEART RATE: 82 BPM | DIASTOLIC BLOOD PRESSURE: 81 MMHG | WEIGHT: 160 LBS | RESPIRATION RATE: 18 BRPM | OXYGEN SATURATION: 99 % | SYSTOLIC BLOOD PRESSURE: 139 MMHG | TEMPERATURE: 97.4 F

## 2019-02-11 DIAGNOSIS — R51.9 HEADACHE: Primary | ICD-10-CM

## 2019-02-11 LAB
ANION GAP SERPL CALCULATED.3IONS-SCNC: 7 MMOL/L (ref 4–13)
APTT PPP: 25 SECONDS (ref 24–33)
BASOPHILS # BLD AUTO: 0.06 THOUSANDS/ΜL (ref 0–0.1)
BASOPHILS NFR BLD AUTO: 1 % (ref 0–1)
BUN SERPL-MCNC: 11 MG/DL (ref 5–25)
CALCIUM SERPL-MCNC: 9.8 MG/DL (ref 8.3–10.1)
CHLORIDE SERPL-SCNC: 102 MMOL/L (ref 100–108)
CO2 SERPL-SCNC: 27 MMOL/L (ref 21–32)
CREAT SERPL-MCNC: 1.05 MG/DL (ref 0.6–1.3)
EOSINOPHIL # BLD AUTO: 0.19 THOUSAND/ΜL (ref 0–0.61)
EOSINOPHIL NFR BLD AUTO: 2 % (ref 0–6)
ERYTHROCYTE [DISTWIDTH] IN BLOOD BY AUTOMATED COUNT: 13.3 % (ref 11.6–15.1)
GFR SERPL CREATININE-BSD FRML MDRD: 57 ML/MIN/1.73SQ M
GLUCOSE SERPL-MCNC: 101 MG/DL (ref 65–140)
HCT VFR BLD AUTO: 45.3 % (ref 34.8–46.1)
HGB BLD-MCNC: 14 G/DL (ref 11.5–15.4)
IMM GRANULOCYTES # BLD AUTO: 0.02 THOUSAND/UL (ref 0–0.2)
IMM GRANULOCYTES NFR BLD AUTO: 0 % (ref 0–2)
INR PPP: 0.99 (ref 0.86–1.16)
LYMPHOCYTES # BLD AUTO: 2.05 THOUSANDS/ΜL (ref 0.6–4.47)
LYMPHOCYTES NFR BLD AUTO: 26 % (ref 14–44)
MCH RBC QN AUTO: 27.9 PG (ref 26.8–34.3)
MCHC RBC AUTO-ENTMCNC: 30.9 G/DL (ref 31.4–37.4)
MCV RBC AUTO: 90 FL (ref 82–98)
MONOCYTES # BLD AUTO: 0.74 THOUSAND/ΜL (ref 0.17–1.22)
MONOCYTES NFR BLD AUTO: 9 % (ref 4–12)
NEUTROPHILS # BLD AUTO: 4.93 THOUSANDS/ΜL (ref 1.85–7.62)
NEUTS SEG NFR BLD AUTO: 62 % (ref 43–75)
NRBC BLD AUTO-RTO: 0 /100 WBCS
PLATELET # BLD AUTO: 230 THOUSANDS/UL (ref 149–390)
PMV BLD AUTO: 10.5 FL (ref 8.9–12.7)
POTASSIUM SERPL-SCNC: 3.8 MMOL/L (ref 3.5–5.3)
PROTHROMBIN TIME: 10.4 SECONDS (ref 9.4–11.7)
RBC # BLD AUTO: 5.02 MILLION/UL (ref 3.81–5.12)
SODIUM SERPL-SCNC: 136 MMOL/L (ref 136–145)
WBC # BLD AUTO: 7.99 THOUSAND/UL (ref 4.31–10.16)

## 2019-02-11 PROCEDURE — 85025 COMPLETE CBC W/AUTO DIFF WBC: CPT | Performed by: EMERGENCY MEDICINE

## 2019-02-11 PROCEDURE — 70498 CT ANGIOGRAPHY NECK: CPT

## 2019-02-11 PROCEDURE — 99285 EMERGENCY DEPT VISIT HI MDM: CPT

## 2019-02-11 PROCEDURE — 85730 THROMBOPLASTIN TIME PARTIAL: CPT | Performed by: EMERGENCY MEDICINE

## 2019-02-11 PROCEDURE — 36415 COLL VENOUS BLD VENIPUNCTURE: CPT | Performed by: EMERGENCY MEDICINE

## 2019-02-11 PROCEDURE — 70496 CT ANGIOGRAPHY HEAD: CPT

## 2019-02-11 PROCEDURE — 85610 PROTHROMBIN TIME: CPT | Performed by: EMERGENCY MEDICINE

## 2019-02-11 PROCEDURE — 96375 TX/PRO/DX INJ NEW DRUG ADDON: CPT

## 2019-02-11 PROCEDURE — 96374 THER/PROPH/DIAG INJ IV PUSH: CPT

## 2019-02-11 PROCEDURE — 80048 BASIC METABOLIC PNL TOTAL CA: CPT | Performed by: EMERGENCY MEDICINE

## 2019-02-11 PROCEDURE — 96361 HYDRATE IV INFUSION ADD-ON: CPT

## 2019-02-11 RX ORDER — KETOROLAC TROMETHAMINE 30 MG/ML
30 INJECTION, SOLUTION INTRAMUSCULAR; INTRAVENOUS ONCE
Status: COMPLETED | OUTPATIENT
Start: 2019-02-11 | End: 2019-02-11

## 2019-02-11 RX ORDER — BUTALBITAL, ACETAMINOPHEN AND CAFFEINE 50; 325; 40 MG/1; MG/1; MG/1
1 TABLET ORAL EVERY 4 HOURS PRN
COMMUNITY
End: 2019-04-11 | Stop reason: SDUPTHER

## 2019-02-11 RX ORDER — MORPHINE SULFATE 4 MG/ML
4 INJECTION, SOLUTION INTRAMUSCULAR; INTRAVENOUS ONCE
Status: COMPLETED | OUTPATIENT
Start: 2019-02-11 | End: 2019-02-11

## 2019-02-11 RX ORDER — CYCLOBENZAPRINE HCL 5 MG
10 TABLET ORAL 3 TIMES DAILY PRN
COMMUNITY
End: 2019-06-06 | Stop reason: ALTCHOICE

## 2019-02-11 RX ORDER — OXYCODONE AND ACETAMINOPHEN 10; 325 MG/1; MG/1
1 TABLET ORAL EVERY 4 HOURS PRN
COMMUNITY
End: 2019-02-13

## 2019-02-11 RX ADMIN — SODIUM CHLORIDE 1000 ML: 0.9 INJECTION, SOLUTION INTRAVENOUS at 13:14

## 2019-02-11 RX ADMIN — KETOROLAC TROMETHAMINE 30 MG: 30 INJECTION, SOLUTION INTRAMUSCULAR at 13:15

## 2019-02-11 RX ADMIN — IOHEXOL 85 ML: 350 INJECTION, SOLUTION INTRAVENOUS at 14:01

## 2019-02-11 RX ADMIN — MORPHINE SULFATE 4 MG: 4 INJECTION INTRAVENOUS at 13:14

## 2019-02-11 NOTE — ED NOTES
Charge nurse received call from CT scan that IV site infiltrated @ 1340  IV access was placed pre arrival by ALS  Left antecubital area slightly red and swollen, ice applied for comfort  MD made aware  Ice applied to area for comfort       Jona Gonzalez RN  02/11/19 2199 Eitan Garcia RN  02/11/19 [id number]

## 2019-02-11 NOTE — ED PROVIDER NOTES
History  Chief Complaint   Patient presents with    Headache - New Onset or New Symptoms     was brushing her hair this am and felt right sided head pain that is tender to the touch and travels to the right jaw and neck     S/P CERVICAL FUSION 6 WKS AGO BY DR KASSIE JOLLEY AT [facility]  LT HAND WKNESS X MANY MONTHS  PT W/ C-COLLAR  C/O SUDDEN RT HEAD PAIN AND RT NECK PAIN  PT TENDER OVER RT SCALP AREA      History provided by:  Patient  Headache - New Onset or New Symptoms   Pain location:  R temporal and R parietal  Quality:  Sharp  Radiates to:  Does not radiate  Onset quality:  Sudden  Duration:  6 hours  Timing:  Constant  Chronicity:  New  Similar to prior headaches: no    Relieved by:  None tried  Exacerbated by: TOUCH  Ineffective treatments:  None tried  Associated symptoms: neck pain        Prior to Admission Medications   Prescriptions Last Dose Informant Patient Reported? Taking?    ALPHAGAN P 0 1 % 2019 at Unknown time  Yes Yes   DULoxetine (CYMBALTA) 60 mg delayed release capsule 2019 at Unknown time Self Yes Yes   Si mg 2 (two) times a day    aspirin (ECOTRIN LOW STRENGTH) 81 mg EC tablet 2019 at Unknown time  No Yes   Sig: Take 1 tablet (81 mg total) by mouth daily   butalbital-acetaminophen-caffeine (FIORICET,ESGIC) -40 mg per tablet 2019 at Unknown time  Yes Yes   Sig: Take 1 tablet by mouth every 4 (four) hours as needed for headaches   calcium citrate-vitamin D (CITRACAL+D) 315-200 MG-UNIT per tablet 2019 at Unknown time  Yes Yes   Sig: Take by mouth daily   cyclobenzaprine (FLEXERIL) 5 mg tablet Past Week at Unknown time  Yes Yes   Sig: Take 10 mg by mouth 3 (three) times a day as needed for muscle spasms   gabapentin (NEURONTIN) 100 mg capsule 2019 at Unknown time  No Yes   Sig: Take 1 capsule (100 mg total) by mouth 3 (three) times a day for 30 days   melatonin 3 mg Past Week at Unknown time  Yes Yes   Sig: Take 3 mg by mouth daily at bedtime omeprazole (PriLOSEC) 40 MG capsule 2/11/2019 at Unknown time  Yes Yes   Sig: Take 40 mg by mouth daily before breakfast   oxyCODONE-acetaminophen (PERCOCET)  mg per tablet 2/10/2019 at Unknown time  Yes Yes   Sig: Take 1 tablet by mouth every 4 (four) hours as needed for moderate pain   polyethylene glycol (MIRALAX) 17 g packet 2/11/2019 at Unknown time  No Yes   Sig: Take 17 g by mouth daily as needed (constipation)      Facility-Administered Medications: None       Past Medical History:   Diagnosis Date    Anesthesia complication     difficult to wake up    GERD (gastroesophageal reflux disease)     Lazy eye     resolved: 3/27/17    Osteopenia     with joint pain-elevated DEV    Tinnitus     Wears glasses     for reading       Past Surgical History:   Procedure Laterality Date    ABDOMINAL SURGERY      lysis of adhesions x 2    APPENDECTOMY      CHOLECYSTECTOMY      open    DILATION AND CURETTAGE OF UTERUS      NEUROMA EXCISION Right 5/26/2017    Procedure: EXCISION MASS / FIBROMA FOOT;  Surgeon: Vitaliy Whittington DPM;  Location: [facility];  Service:     RIGHT OOPHORECTOMY      WISDOM TOOTH EXTRACTION      x4       Family History   Problem Relation Age of Onset    Heart disease Mother     Stroke Mother 58    COPD Mother     Arthritis Mother     Hypertension Father     Kidney disease Brother         kidney transplant     I have reviewed and agree with the history as documented  Social History     Tobacco Use    Smoking status: Never Smoker    Smokeless tobacco: Never Used   Substance Use Topics    Alcohol use: No    Drug use: No        Review of Systems   Musculoskeletal: Positive for neck pain  Neurological: Positive for headaches  All other systems reviewed and are negative  Physical Exam  Physical Exam   Constitutional: She is oriented to person, place, and time  She appears well-developed and well-nourished  No distress  HENT:   Head: Normocephalic and atraumatic  Neck:   IN C-COLLAR   Cardiovascular: Normal rate and regular rhythm  Pulmonary/Chest: She exhibits no tenderness  Neurological: She is alert and oriented to person, place, and time  No cranial nerve deficit  Coordination normal    LT HAND MILD WEAKNESS, OLD   Skin: Skin is warm and dry  No rash noted  She is not diaphoretic  No erythema  Nursing note and vitals reviewed        Vital Signs  ED Triage Vitals   Temperature Pulse Respirations Blood Pressure SpO2   02/11/19 1239 02/11/19 1239 02/11/19 1239 02/11/19 1239 02/11/19 1239   (!) 97 4 °F (36 3 °C) 91 22 (!) 176/91 100 %      Temp src Heart Rate Source Patient Position - Orthostatic VS BP Location FiO2 (%)   -- 02/11/19 1512 02/11/19 1512 02/11/19 1512 --    Monitor Lying Right arm       Pain Score       02/11/19 1239       Worst Possible Pain           Vitals:    02/11/19 1239 02/11/19 1512   BP: (!) 176/91 141/79   Pulse: 91 83   Patient Position - Orthostatic VS:  Lying       Visual Acuity      ED Medications  Medications   sodium chloride 0 9 % bolus 1,000 mL (1,000 mL Intravenous New Bag 2/11/19 1314)   morphine (PF) 4 mg/mL injection 4 mg (4 mg Intravenous Given 2/11/19 1314)   ketorolac (TORADOL) injection 30 mg (30 mg Intravenous Given 2/11/19 1315)   iohexol (OMNIPAQUE) 350 MG/ML injection (MULTI-DOSE) 85 mL (85 mL Intravenous Given 2/11/19 1401)       Diagnostic Studies  Results Reviewed     Procedure Component Value Units Date/Time    APTT [[id number]]  (Normal) Collected:  02/11/19 1310    Lab Status:  Final result Specimen:  Blood from Arm, Right Updated:  02/11/19 1327     PTT 25 seconds     Protime-INR [[id number]]  (Normal) Collected:  02/11/19 1310    Lab Status:  Final result Specimen:  Blood from Arm, Right Updated:  02/11/19 1327     Protime 10 4 seconds      INR 3 68    Basic metabolic panel [[id number]] Collected:  02/11/19 1310    Lab Status:  Final result Specimen:  Blood from Arm, Right Updated:  02/11/19 1327     Sodium 136 mmol/L Potassium 3 8 mmol/L      Chloride 102 mmol/L      CO2 27 mmol/L      ANION GAP 7 mmol/L      BUN 11 mg/dL      Creatinine 1 05 mg/dL      Glucose 101 mg/dL      Calcium 9 8 mg/dL      eGFR 57 ml/min/1 73sq m     Narrative:       National Kidney Disease Education Program recommendations are as follows:  GFR calculation is accurate only with a steady state creatinine  Chronic Kidney disease less than 60 ml/min/1 73 sq  meters  Kidney failure less than 15 ml/min/1 73 sq  meters  CBC and differential [[id number]]  (Abnormal) Collected:  02/11/19 1310    Lab Status:  Final result Specimen:  Blood from Arm, Right Updated:  02/11/19 1315     WBC 7 99 Thousand/uL      RBC 5 02 Million/uL      Hemoglobin 14 0 g/dL      Hematocrit 45 3 %      MCV 90 fL      MCH 27 9 pg      MCHC 30 9 g/dL      RDW 13 3 %      MPV 10 5 fL      Platelets 714 Thousands/uL      nRBC 0 /100 WBCs      Neutrophils Relative 62 %      Immat GRANS % 0 %      Lymphocytes Relative 26 %      Monocytes Relative 9 %      Eosinophils Relative 2 %      Basophils Relative 1 %      Neutrophils Absolute 4 93 Thousands/µL      Immature Grans Absolute 0 02 Thousand/uL      Lymphocytes Absolute 2 05 Thousands/µL      Monocytes Absolute 0 74 Thousand/µL      Eosinophils Absolute 0 19 Thousand/µL      Basophils Absolute 0 06 Thousands/µL                  CTA head and neck with and without contrast   Final Result by Issac Doe DO (02/11 1444)      Normal CT of the brain  Unremarkable CT angiography of the head and neck  Workstation performed: [id number]                    Procedures  Procedures       Phone Contacts  ED Phone Contact    ED Course                               MDM  Number of Diagnoses or Management Options  Diagnosis management comments: NEG W/UP, WILL D/C TO F/UP W/ HER SURGEON  PT W/ TENDER SCALP, LIKELY TRAUMATIC CAUSE OF PAIN        Disposition  Final diagnoses:   None     ED Disposition     None      Follow-up Information    None         Patient's Medications   Discharge Prescriptions    No medications on file     No discharge procedures on file      ED Provider  Electronically Signed by           Walter Sandoval MD  02/11/19 9868

## 2019-02-13 ENCOUNTER — APPOINTMENT (EMERGENCY)
Dept: CT IMAGING | Facility: HOSPITAL | Age: 64
End: 2019-02-13
Payer: COMMERCIAL

## 2019-02-13 ENCOUNTER — APPOINTMENT (OUTPATIENT)
Dept: MRI IMAGING | Facility: HOSPITAL | Age: 64
End: 2019-02-13
Payer: COMMERCIAL

## 2019-02-13 ENCOUNTER — HOSPITAL ENCOUNTER (OUTPATIENT)
Facility: HOSPITAL | Age: 64
Setting detail: OBSERVATION
Discharge: NURSING, REHAB OR LONG-TERM CARE | End: 2019-02-15
Attending: EMERGENCY MEDICINE | Admitting: INTERNAL MEDICINE
Payer: COMMERCIAL

## 2019-02-13 DIAGNOSIS — R51.9 RIGHT SIDED TEMPORAL HEADACHE: ICD-10-CM

## 2019-02-13 DIAGNOSIS — E78.2 MIXED DYSLIPIDEMIA: ICD-10-CM

## 2019-02-13 DIAGNOSIS — H53.451 PERIPHERAL VISUAL FIELD DEFECT, RIGHT: Primary | ICD-10-CM

## 2019-02-13 DIAGNOSIS — R51.9 HEADACHE: ICD-10-CM

## 2019-02-13 DIAGNOSIS — R53.1 WEAKNESS: ICD-10-CM

## 2019-02-13 LAB
ANION GAP SERPL CALCULATED.3IONS-SCNC: 8 MMOL/L (ref 4–13)
APTT PPP: 26 SECONDS (ref 26–38)
BASOPHILS # BLD AUTO: 0.05 THOUSANDS/ΜL (ref 0–0.1)
BASOPHILS NFR BLD AUTO: 1 % (ref 0–1)
BUN SERPL-MCNC: 12 MG/DL (ref 5–25)
CALCIUM SERPL-MCNC: 9.7 MG/DL (ref 8.3–10.1)
CHLORIDE SERPL-SCNC: 105 MMOL/L (ref 100–108)
CO2 SERPL-SCNC: 29 MMOL/L (ref 21–32)
CREAT SERPL-MCNC: 0.86 MG/DL (ref 0.6–1.3)
EOSINOPHIL # BLD AUTO: 0.17 THOUSAND/ΜL (ref 0–0.61)
EOSINOPHIL NFR BLD AUTO: 2 % (ref 0–6)
ERYTHROCYTE [DISTWIDTH] IN BLOOD BY AUTOMATED COUNT: 13.4 % (ref 11.6–15.1)
GFR SERPL CREATININE-BSD FRML MDRD: 73 ML/MIN/1.73SQ M
GLUCOSE SERPL-MCNC: 101 MG/DL (ref 65–140)
HCT VFR BLD AUTO: 38.7 % (ref 34.8–46.1)
HGB BLD-MCNC: 12.5 G/DL (ref 11.5–15.4)
IMM GRANULOCYTES # BLD AUTO: 0.02 THOUSAND/UL (ref 0–0.2)
IMM GRANULOCYTES NFR BLD AUTO: 0 % (ref 0–2)
INR PPP: 1.01 (ref 0.86–1.17)
LYMPHOCYTES # BLD AUTO: 1.85 THOUSANDS/ΜL (ref 0.6–4.47)
LYMPHOCYTES NFR BLD AUTO: 21 % (ref 14–44)
MCH RBC QN AUTO: 28 PG (ref 26.8–34.3)
MCHC RBC AUTO-ENTMCNC: 32.3 G/DL (ref 31.4–37.4)
MCV RBC AUTO: 87 FL (ref 82–98)
MONOCYTES # BLD AUTO: 0.66 THOUSAND/ΜL (ref 0.17–1.22)
MONOCYTES NFR BLD AUTO: 7 % (ref 4–12)
NEUTROPHILS # BLD AUTO: 6.19 THOUSANDS/ΜL (ref 1.85–7.62)
NEUTS SEG NFR BLD AUTO: 69 % (ref 43–75)
NRBC BLD AUTO-RTO: 0 /100 WBCS
PLATELET # BLD AUTO: 217 THOUSANDS/UL (ref 149–390)
PMV BLD AUTO: 10.4 FL (ref 8.9–12.7)
POTASSIUM SERPL-SCNC: 4.8 MMOL/L (ref 3.5–5.3)
PROTHROMBIN TIME: 13 SECONDS (ref 11.8–14.2)
RBC # BLD AUTO: 4.47 MILLION/UL (ref 3.81–5.12)
SODIUM SERPL-SCNC: 142 MMOL/L (ref 136–145)
WBC # BLD AUTO: 8.94 THOUSAND/UL (ref 4.31–10.16)

## 2019-02-13 PROCEDURE — 93005 ELECTROCARDIOGRAM TRACING: CPT

## 2019-02-13 PROCEDURE — 85730 THROMBOPLASTIN TIME PARTIAL: CPT | Performed by: EMERGENCY MEDICINE

## 2019-02-13 PROCEDURE — 70450 CT HEAD/BRAIN W/O DYE: CPT

## 2019-02-13 PROCEDURE — 85025 COMPLETE CBC W/AUTO DIFF WBC: CPT | Performed by: EMERGENCY MEDICINE

## 2019-02-13 PROCEDURE — A9585 GADOBUTROL INJECTION: HCPCS | Performed by: PHYSICIAN ASSISTANT

## 2019-02-13 PROCEDURE — 85610 PROTHROMBIN TIME: CPT | Performed by: EMERGENCY MEDICINE

## 2019-02-13 PROCEDURE — 36415 COLL VENOUS BLD VENIPUNCTURE: CPT | Performed by: EMERGENCY MEDICINE

## 2019-02-13 PROCEDURE — 80048 BASIC METABOLIC PNL TOTAL CA: CPT | Performed by: EMERGENCY MEDICINE

## 2019-02-13 PROCEDURE — 70553 MRI BRAIN STEM W/O & W/DYE: CPT

## 2019-02-13 PROCEDURE — 96375 TX/PRO/DX INJ NEW DRUG ADDON: CPT

## 2019-02-13 PROCEDURE — 99285 EMERGENCY DEPT VISIT HI MDM: CPT

## 2019-02-13 PROCEDURE — 96374 THER/PROPH/DIAG INJ IV PUSH: CPT

## 2019-02-13 RX ORDER — DIPHENHYDRAMINE HYDROCHLORIDE 50 MG/ML
25 INJECTION INTRAMUSCULAR; INTRAVENOUS ONCE
Status: COMPLETED | OUTPATIENT
Start: 2019-02-13 | End: 2019-02-13

## 2019-02-13 RX ORDER — OXYCODONE HYDROCHLORIDE 5 MG/1
5 TABLET ORAL EVERY 4 HOURS PRN
Status: DISCONTINUED | OUTPATIENT
Start: 2019-02-13 | End: 2019-02-15 | Stop reason: HOSPADM

## 2019-02-13 RX ORDER — CYCLOBENZAPRINE HCL 10 MG
10 TABLET ORAL 3 TIMES DAILY PRN
Status: DISCONTINUED | OUTPATIENT
Start: 2019-02-13 | End: 2019-02-15 | Stop reason: HOSPADM

## 2019-02-13 RX ORDER — ONDANSETRON 2 MG/ML
4 INJECTION INTRAMUSCULAR; INTRAVENOUS EVERY 8 HOURS PRN
Status: DISCONTINUED | OUTPATIENT
Start: 2019-02-13 | End: 2019-02-15 | Stop reason: HOSPADM

## 2019-02-13 RX ORDER — OXYCODONE AND ACETAMINOPHEN 10; 325 MG/1; MG/1
1 TABLET ORAL EVERY 4 HOURS PRN
COMMUNITY
End: 2019-03-06 | Stop reason: SDUPTHER

## 2019-02-13 RX ORDER — ASPIRIN 81 MG/1
81 TABLET ORAL DAILY
Status: DISCONTINUED | OUTPATIENT
Start: 2019-02-14 | End: 2019-02-15 | Stop reason: HOSPADM

## 2019-02-13 RX ORDER — CALCIUM CARBONATE 500(1250)
2 TABLET ORAL
Status: DISCONTINUED | OUTPATIENT
Start: 2019-02-14 | End: 2019-02-15 | Stop reason: HOSPADM

## 2019-02-13 RX ORDER — MIDODRINE HYDROCHLORIDE 10 MG/1
10 TABLET ORAL 3 TIMES DAILY
COMMUNITY
End: 2019-06-06 | Stop reason: ALTCHOICE

## 2019-02-13 RX ORDER — ALPRAZOLAM 0.5 MG/1
TABLET ORAL 3 TIMES DAILY PRN
COMMUNITY
End: 2019-09-20 | Stop reason: HOSPADM

## 2019-02-13 RX ORDER — BUTALBITAL, ACETAMINOPHEN AND CAFFEINE 50; 325; 40 MG/1; MG/1; MG/1
1 TABLET ORAL EVERY 4 HOURS PRN
Status: DISCONTINUED | OUTPATIENT
Start: 2019-02-13 | End: 2019-02-14

## 2019-02-13 RX ORDER — PANTOPRAZOLE SODIUM 40 MG/1
40 TABLET, DELAYED RELEASE ORAL
Status: DISCONTINUED | OUTPATIENT
Start: 2019-02-14 | End: 2019-02-15 | Stop reason: HOSPADM

## 2019-02-13 RX ORDER — ATORVASTATIN CALCIUM 40 MG/1
40 TABLET, FILM COATED ORAL
Status: DISCONTINUED | OUTPATIENT
Start: 2019-02-14 | End: 2019-02-15 | Stop reason: HOSPADM

## 2019-02-13 RX ORDER — GABAPENTIN 100 MG/1
100 CAPSULE ORAL 3 TIMES DAILY
Status: DISCONTINUED | OUTPATIENT
Start: 2019-02-13 | End: 2019-02-15 | Stop reason: HOSPADM

## 2019-02-13 RX ORDER — POTASSIUM CHLORIDE 20 MEQ/1
20 TABLET, EXTENDED RELEASE ORAL DAILY
Status: DISCONTINUED | OUTPATIENT
Start: 2019-02-14 | End: 2019-02-15 | Stop reason: HOSPADM

## 2019-02-13 RX ORDER — LANOLIN ALCOHOL/MO/W.PET/CERES
3 CREAM (GRAM) TOPICAL
COMMUNITY
End: 2019-06-06 | Stop reason: ALTCHOICE

## 2019-02-13 RX ORDER — LANOLIN ALCOHOL/MO/W.PET/CERES
3 CREAM (GRAM) TOPICAL
Status: DISCONTINUED | OUTPATIENT
Start: 2019-02-13 | End: 2019-02-15 | Stop reason: HOSPADM

## 2019-02-13 RX ORDER — METOCLOPRAMIDE HYDROCHLORIDE 5 MG/ML
10 INJECTION INTRAMUSCULAR; INTRAVENOUS ONCE
Status: COMPLETED | OUTPATIENT
Start: 2019-02-13 | End: 2019-02-13

## 2019-02-13 RX ORDER — MIDODRINE HYDROCHLORIDE 5 MG/1
10 TABLET ORAL
Status: DISCONTINUED | OUTPATIENT
Start: 2019-02-14 | End: 2019-02-15 | Stop reason: HOSPADM

## 2019-02-13 RX ORDER — DULOXETIN HYDROCHLORIDE 60 MG/1
60 CAPSULE, DELAYED RELEASE ORAL 2 TIMES DAILY
Status: DISCONTINUED | OUTPATIENT
Start: 2019-02-14 | End: 2019-02-15 | Stop reason: HOSPADM

## 2019-02-13 RX ORDER — SENNOSIDES 8.6 MG
1 TABLET ORAL
Status: DISCONTINUED | OUTPATIENT
Start: 2019-02-13 | End: 2019-02-15 | Stop reason: HOSPADM

## 2019-02-13 RX ORDER — DIAZEPAM 5 MG/ML
2.5 INJECTION, SOLUTION INTRAMUSCULAR; INTRAVENOUS ONCE
Status: COMPLETED | OUTPATIENT
Start: 2019-02-13 | End: 2019-02-13

## 2019-02-13 RX ORDER — BRIMONIDINE TARTRATE 0.15 %
1 DROPS OPHTHALMIC (EYE) 3 TIMES DAILY
Status: DISCONTINUED | OUTPATIENT
Start: 2019-02-13 | End: 2019-02-15 | Stop reason: HOSPADM

## 2019-02-13 RX ORDER — ASPIRIN 325 MG
325 TABLET ORAL ONCE
Status: COMPLETED | OUTPATIENT
Start: 2019-02-13 | End: 2019-02-13

## 2019-02-13 RX ORDER — ALPRAZOLAM 0.5 MG/1
0.5 TABLET ORAL 3 TIMES DAILY PRN
Status: DISCONTINUED | OUTPATIENT
Start: 2019-02-13 | End: 2019-02-15 | Stop reason: HOSPADM

## 2019-02-13 RX ADMIN — GABAPENTIN 100 MG: 100 CAPSULE ORAL at 23:18

## 2019-02-13 RX ADMIN — GADOBUTROL 7 ML: 604.72 INJECTION INTRAVENOUS at 23:05

## 2019-02-13 RX ADMIN — BRIMONIDINE TARTRATE 1 DROP: 1.5 SOLUTION OPHTHALMIC at 23:19

## 2019-02-13 RX ADMIN — Medication 2.5 MG: at 19:03

## 2019-02-13 RX ADMIN — ASPIRIN 325 MG: 325 TABLET ORAL at 20:15

## 2019-02-13 RX ADMIN — METOCLOPRAMIDE 10 MG: 5 INJECTION, SOLUTION INTRAMUSCULAR; INTRAVENOUS at 19:06

## 2019-02-13 RX ADMIN — MELATONIN 3 MG: at 23:18

## 2019-02-13 RX ADMIN — DIPHENHYDRAMINE HYDROCHLORIDE 25 MG: 50 INJECTION, SOLUTION INTRAMUSCULAR; INTRAVENOUS at 19:01

## 2019-02-13 RX ADMIN — BUTALBITAL, ACETAMINOPHEN AND CAFFEINE 1 TABLET: 50; 325; 40 TABLET ORAL at 23:23

## 2019-02-13 NOTE — ED PROVIDER NOTES
History  Chief Complaint   Patient presents with    Headache - New Onset or New Symptoms     Seen at [facility] on Monday,  with Acute Headache, Today woke up with peripheral vision loss  Note weakness, per patient has has a hx of this  Had Cerival fusion in January  Has had dizziness prior to head pain       History provided by:  Patient   used: No     59-year-old female referred to the emergency department for stroke-like symptoms by her ophthalmologist   Was seen in the office today and noted to have some right-sided temporal visual field deficits  Patient states that she woke up like this today  She has had a history of field deficits in the past and had an extensive workup by neuro ophthalmology at the [address] [place] [address] last year  The per records these deficits are similar to what she had then she  Patient notes that the deficit she was having over the summer had resolved and then recurred today  Two days ago the patient was seen had Ana Zimmerman and had a CTA of the head and neck with no acute abnormalities  This was done for a right-sided headache that was fairly acute in onset  She continues to have right-sided temporal pain and notes that that is actually her biggest concern  No stroke alert  Patient has had symptoms for more than 12 hours and had woken up with them  Will plan discussion with Neurology attempt to contact her ophthalmologist     Prior to Admission Medications   Prescriptions Last Dose Informant Patient Reported? Taking?    ALPRAZolam (XANAX) 0 5 mg tablet   Yes Yes   Sig: Take by mouth 3 (three) times a day as needed for anxiety   DULoxetine (CYMBALTA) 60 mg delayed release capsule  Self Yes Yes   Si mg 2 (two) times a day    OXYCODONE HCL PO   Yes Yes   Sig: Take 5 mg by mouth as needed   POTASSIUM BICARBONATE PO   Yes Yes   Sig: Take 25 mEq by mouth daily   Sennosides (SENNA LAX PO)   Yes Yes   Sig: Take by mouth   aspirin (ECOTRIN LOW STRENGTH) 81 mg EC tablet   No Yes   Sig: Take 1 tablet (81 mg total) by mouth daily   brimonidine (ALPHAGAN P) 0 1 %   Yes Yes   butalbital-acetaminophen-caffeine (FIORICET,ESGIC) -40 mg per tablet   Yes Yes   Sig: Take 1 tablet by mouth every 4 (four) hours as needed for headaches   calcium carbonate 1250 MG capsule   Yes Yes   Sig: Take 1,250 mg by mouth   cyclobenzaprine (FLEXERIL) 5 mg tablet   Yes Yes   Sig: Take 10 mg by mouth 3 (three) times a day as needed for muscle spasms   gabapentin (NEURONTIN) 100 mg capsule   No Yes   Sig: Take 1 capsule (100 mg total) by mouth 3 (three) times a day for 30 days   melatonin 3 mg   Yes Yes   Sig: Take 3 mg by mouth daily at bedtime   midodrine (PROAMATINE) 10 MG tablet   Yes Yes   Sig: Take 10 mg by mouth 3 (three) times a day   omeprazole (PriLOSEC) 40 MG capsule   Yes Yes   Sig: Take 40 mg by mouth daily    oxyCODONE-acetaminophen (PERCOCET)  mg per tablet   Yes Yes   Sig: Take 1 tablet by mouth every 4 (four) hours as needed for moderate pain      Facility-Administered Medications: None       Past Medical History:   Diagnosis Date    Anesthesia complication     difficult to wake up    GERD (gastroesophageal reflux disease)     Lazy eye     resolved: 3/27/17    Osteopenia     with joint pain-elevated DEV    Tinnitus     Wears glasses     for reading       Past Surgical History:   Procedure Laterality Date    ABDOMINAL SURGERY      lysis of adhesions x 2    APPENDECTOMY      CHOLECYSTECTOMY      open    DILATION AND CURETTAGE OF UTERUS      NEUROMA EXCISION Right 5/26/2017    Procedure: EXCISION MASS / FIBROMA FOOT;  Surgeon: Feliciano Carter DPM;  Location: [facility];  Service:     RIGHT OOPHORECTOMY      WISDOM TOOTH EXTRACTION      x4       Family History   Problem Relation Age of Onset    Heart disease Mother     Stroke Mother 58    COPD Mother     Arthritis Mother     Hypertension Father     Kidney disease Brother         kidney transplant     I have reviewed and agree with the history as documented  Social History     Tobacco Use    Smoking status: Never Smoker    Smokeless tobacco: Never Used   Substance Use Topics    Alcohol use: No    Drug use: No        Review of Systems   Constitutional: Negative for activity change, appetite change, fatigue and fever  Eyes: Positive for visual disturbance  Gastrointestinal: Negative for nausea and vomiting  Musculoskeletal: Negative for back pain and neck pain  Neurological: Positive for dizziness and headaches  All other systems reviewed and are negative  Physical Exam  Physical Exam   Constitutional: She is oriented to person, place, and time  She appears well-developed and well-nourished  No distress  HENT:   Head: Normocephalic  Mouth/Throat: Oropharynx is clear and moist    Tenderness over the right temporal area  Eyes: Pupils are equal, round, and reactive to light  Conjunctivae and EOM are normal    Right temporal superior and inferior visual field deficit by confrontation  Cardiovascular: Normal rate and regular rhythm  Pulmonary/Chest: Effort normal  No respiratory distress  Neurological: She is alert and oriented to person, place, and time  No sensory deficit  She exhibits normal muscle tone  Coordination normal    Skin: Skin is warm and dry  Psychiatric: Her behavior is normal    Anxious  Nursing note and vitals reviewed        Vital Signs  ED Triage Vitals [02/13/19 1650]   Temperature Pulse Respirations Blood Pressure SpO2   (!) 97 4 °F (36 3 °C) 102 20 167/92 96 %      Temp Source Heart Rate Source Patient Position - Orthostatic VS BP Location FiO2 (%)   Oral Monitor Sitting Right arm --      Pain Score       No Pain           Vitals:    02/15/19 0300 02/15/19 0711 02/15/19 1100 02/15/19 1506   BP: 125/65 131/68 134/67 154/83   Pulse: 80 84 90 87   Patient Position - Orthostatic VS: Lying Lying  Sitting       Visual Acuity  Visual Acuity      Most Recent Value   L Pupil Size (mm)  5   R Pupil Size (mm)  5   L Pupil Shape  Round   R Pupil Shape  Round          ED Medications  Medications   diphenhydrAMINE (BENADRYL) injection 25 mg (25 mg Intravenous Given 2/13/19 1901)   metoclopramide (REGLAN) injection 10 mg (10 mg Intravenous Given 2/13/19 1906)   diazepam (VALIUM) injection 2 5 mg (2 5 mg Intravenous Given 2/13/19 1903)   aspirin tablet 325 mg (325 mg Oral Given 2/13/19 2015)   gadobutrol injection (MULTI-DOSE) SOLN 7 mL (7 mL Intravenous Given 2/13/19 2305)       Diagnostic Studies  Results Reviewed     Procedure Component Value Units Date/Time    Basic metabolic panel [[id number]] Collected:  02/13/19 1820    Lab Status:  Final result Specimen:  Blood from Arm, Right Updated:  02/13/19 1841     Sodium 142 mmol/L      Potassium 4 8 mmol/L      Chloride 105 mmol/L      CO2 29 mmol/L      ANION GAP 8 mmol/L      BUN 12 mg/dL      Creatinine 0 86 mg/dL      Glucose 101 mg/dL      Calcium 9 7 mg/dL      eGFR 73 ml/min/1 73sq m     Narrative:       National Kidney Disease Education Program recommendations are as follows:  GFR calculation is accurate only with a steady state creatinine  Chronic Kidney disease less than 60 ml/min/1 73 sq  meters  Kidney failure less than 15 ml/min/1 73 sq  meters      Protime-INR [[id number]]  (Normal) Collected:  02/13/19 1820    Lab Status:  Final result Specimen:  Blood from Arm, Right Updated:  02/13/19 1840     Protime 13 0 seconds      INR 1 01    APTT [[id number]]  (Normal) Collected:  02/13/19 1820    Lab Status:  Final result Specimen:  Blood from Arm, Right Updated:  02/13/19 1840     PTT 26 seconds     CBC and differential [[id number]] Collected:  02/13/19 1820    Lab Status:  Final result Specimen:  Blood from Arm, Right Updated:  02/13/19 1830     WBC 8 94 Thousand/uL      RBC 4 47 Million/uL      Hemoglobin 12 5 g/dL      Hematocrit 38 7 %      MCV 87 fL      MCH 28 0 pg      MCHC 32 3 g/dL      RDW 13 4 %      MPV 10 4 fL      Platelets 281 Thousands/uL      nRBC 0 /100 WBCs      Neutrophils Relative 69 %      Immat GRANS % 0 %      Lymphocytes Relative 21 %      Monocytes Relative 7 %      Eosinophils Relative 2 %      Basophils Relative 1 %      Neutrophils Absolute 6 19 Thousands/µL      Immature Grans Absolute 0 02 Thousand/uL      Lymphocytes Absolute 1 85 Thousands/µL      Monocytes Absolute 0 66 Thousand/µL      Eosinophils Absolute 0 17 Thousand/µL      Basophils Absolute 0 05 Thousands/µL                  MRI brain w wo contrast   Final Result by Hoa Bashir MD (02/13 2326)         1  No evidence of acute infarct, hemorrhage or mass  2   Few white matter lesions in the lachelle may represent microangiopathic change  Workstation performed: [id number]         CT head without contrast   Final Result by Tucker Arnold MD (02/13 2002)      No acute intracranial abnormality  Workstation performed: [id number]                    Procedures  ECG 12 Lead Documentation  Date/Time: 2/13/2019 6:25 PM  Performed by: Grace Duong MD  Authorized by: Grace Duong MD     Indications / Diagnosis:  Possible CVA  ECG reviewed by me, the ED Provider: yes    Patient location:  ED  Previous ECG:     Previous ECG:  Compared to current    Comparison ECG info:  3/3/18    Similarity:  No change  Rate:     ECG rate:  78  Rhythm:     Rhythm: sinus rhythm    Ectopy:     Ectopy: none    QRS:     QRS axis:  Normal  Conduction:     Conduction: normal    ST segments:     ST segments:  Normal  T waves:     T waves: normal             Phone Contacts  ED Phone Contact    ED Course  ED Course as of Mar 05 2217   Wed Feb 13, 2019   1900 Discussed case with the on-call neurologist   Will plan noncontrast CT head to rule out any acute changes/ICH and plan ultimate admission on stroke pathway with aspirin, MRI, echo, carotids                                    MDM  Number of Diagnoses or Management Options  Peripheral visual field defect, right: new and requires workup  Right sided temporal headache: established and worsening  Diagnosis management comments: 81-year-old female with right-sided temporal headache over the past few days now with right temporal visual field deficits on exam   Her field deficits had been present in the past and had resolved  No etiology was found after an extensive workup by Neuro Ophthalmology  Patient seen by her ophthalmologist today and referred to the emergency department for evaluation of stroke  Patient had a normal CT of the head neck 2 days ago  Discussed with Neurology  Will not repeat CTA will plan admission for MRI  ESR also sent to rule out temporal arteritis and this was normal        Amount and/or Complexity of Data Reviewed  Clinical lab tests: ordered and reviewed  Tests in the radiology section of CPT®: reviewed  Discuss the patient with other providers: yes    Patient Progress  Patient progress: stable      Disposition  Final diagnoses:   Peripheral visual field defect, right   Right sided temporal headache     Time reflects when diagnosis was documented in both MDM as applicable and the Disposition within this note     Time User Action Codes Description Comment    2/13/2019  8:15 PM Arlin Moralesl Add [N17 396] Peripheral visual field defect, right     2/13/2019  8:15 PM Ricki Singh Add [R51] Right sided temporal headache     2/13/2019 10:15 PM [place] Add [R53 1] Weakness     2/15/2019  4:42 PM Eliana Craig Add [R51] Headache     2/15/2019  4:42 PM Eliana Craig Add [E78 2] Mixed dyslipidemia       ED Disposition     ED Disposition Condition Date/Time Comment    Admit Good Wed Feb 13, 2019  8:16 PM Case was discussed with Annetta Scott and the patient's admission status was agreed to be Admission Status: observation status to the service of Dr Chelo Cuellar MD Documentation      Most Recent Value   Accepting Facility Name, [address] [facility]      RN Documentation      Most Recent Value   Accepting Facility Name, [address] [place] [address]      Follow-up Information     Follow up With Specialties Details Why Contact Info    [address], DO Neurology Follow up Please call next week after discharge to schedule an approximate 1 month hospital migraine variant headache appointment with our neurologist at the [facility] 21  [phone]            Discharge Medication List as of 2/15/2019  4:59 PM      START taking these medications    Details   acetaminophen (TYLENOL) 325 mg tablet Take 2 tablets (650 mg total) by mouth every 8 (eight) hours as needed for mild pain or headaches, Starting Fri 2/15/2019, Normal      atorvastatin (LIPITOR) 40 mg tablet Take 1 tablet (40 mg total) by mouth daily with dinner for 30 days, Starting Fri 2/15/2019, Until Sun 3/17/2019, Normal         CONTINUE these medications which have CHANGED    Details   !! butalbital-acetaminophen-caffeine (FIORICET,ESGIC) -40 mg per tablet Take 1 tablet by mouth every 12 (twelve) hours as needed for headaches, Starting Fri 2/15/2019, Print       !! - Potential duplicate medications found  Please discuss with provider        CONTINUE these medications which have NOT CHANGED    Details   ALPRAZolam (XANAX) 0 5 mg tablet Take by mouth 3 (three) times a day as needed for anxiety, Historical Med      aspirin (ECOTRIN LOW STRENGTH) 81 mg EC tablet Take 1 tablet (81 mg total) by mouth daily, Starting Wed 3/7/2018, No Print      brimonidine (ALPHAGAN P) 0 1 % Historical Med      !! butalbital-acetaminophen-caffeine (FIORICET,ESGIC) -40 mg per tablet Take 1 tablet by mouth every 4 (four) hours as needed for headaches, Historical Med      calcium carbonate 1250 MG capsule Take 1,250 mg by mouth, Historical Med      cyclobenzaprine (FLEXERIL) 5 mg tablet Take 10 mg by mouth 3 (three) times a day as needed for muscle spasms, Historical Med      DULoxetine (CYMBALTA) 60 mg delayed release capsule 60 mg 2 (two) times a day , Starting Thu 10/11/2018, Historical Med      gabapentin (NEURONTIN) 100 mg capsule Take 1 capsule (100 mg total) by mouth 3 (three) times a day for 30 days, Starting Wed 2/28/2018, Until Wed 2/13/2019, Normal      melatonin 3 mg Take 3 mg by mouth daily at bedtime, Historical Med      midodrine (PROAMATINE) 10 MG tablet Take 10 mg by mouth 3 (three) times a day, Historical Med      omeprazole (PriLOSEC) 40 MG capsule Take 40 mg by mouth daily , Starting Sat 3/24/2018, Historical Med      OXYCODONE HCL PO Take 5 mg by mouth as needed, Historical Med      oxyCODONE-acetaminophen (PERCOCET)  mg per tablet Take 1 tablet by mouth every 4 (four) hours as needed for moderate pain, Historical Med      POTASSIUM BICARBONATE PO Take 25 mEq by mouth daily, Historical Med      Sennosides (SENNA LAX PO) Take by mouth, Historical Med       !! - Potential duplicate medications found  Please discuss with provider          Outpatient Discharge Orders   Call provider for:  difficulty breathing, headache or visual disturbances     Discharge Condtion:  Stabilized     Patient Aware of Diagnosis: Yes     Free of Communicable Disease:   Yes     Physical Therapy Eval And Treat     Occupational Therapy Eval and Treat     Activity:  Per Rehab Recommendations       ED Provider  Electronically Signed by           Thana Goldberg, MD  03/05/19 5539

## 2019-02-14 PROBLEM — I95.1 AUTONOMIC ORTHOSTATIC HYPOTENSION: Status: ACTIVE | Noted: 2019-02-14

## 2019-02-14 PROBLEM — G95.9 CERVICAL MYELOPATHY WITH CERVICAL RADICULOPATHY (HCC): Status: ACTIVE | Noted: 2019-02-14

## 2019-02-14 PROBLEM — Z98.890: Status: ACTIVE | Noted: 2019-02-14

## 2019-02-14 PROBLEM — E78.2 MIXED DYSLIPIDEMIA: Status: ACTIVE | Noted: 2019-02-14

## 2019-02-14 PROBLEM — Z86.69 HISTORY OF MIGRAINE HEADACHES: Status: ACTIVE | Noted: 2019-02-14

## 2019-02-14 PROBLEM — M54.12 CERVICAL MYELOPATHY WITH CERVICAL RADICULOPATHY (HCC): Status: ACTIVE | Noted: 2019-02-14

## 2019-02-14 LAB
ANION GAP SERPL CALCULATED.3IONS-SCNC: 9 MMOL/L (ref 4–13)
ATRIAL RATE: 78 BPM
BUN SERPL-MCNC: 12 MG/DL (ref 5–25)
CALCIUM SERPL-MCNC: 8.8 MG/DL (ref 8.3–10.1)
CHLORIDE SERPL-SCNC: 107 MMOL/L (ref 100–108)
CHOLEST SERPL-MCNC: 222 MG/DL (ref 50–200)
CO2 SERPL-SCNC: 27 MMOL/L (ref 21–32)
CREAT SERPL-MCNC: 0.83 MG/DL (ref 0.6–1.3)
ERYTHROCYTE [DISTWIDTH] IN BLOOD BY AUTOMATED COUNT: 13.4 % (ref 11.6–15.1)
EST. AVERAGE GLUCOSE BLD GHB EST-MCNC: 126 MG/DL
GFR SERPL CREATININE-BSD FRML MDRD: 76 ML/MIN/1.73SQ M
GLUCOSE P FAST SERPL-MCNC: 106 MG/DL (ref 65–99)
GLUCOSE SERPL-MCNC: 106 MG/DL (ref 65–140)
HBA1C MFR BLD: 6 % (ref 4.2–6.3)
HCT VFR BLD AUTO: 37.7 % (ref 34.8–46.1)
HDLC SERPL-MCNC: 34 MG/DL (ref 40–60)
HGB BLD-MCNC: 12.2 G/DL (ref 11.5–15.4)
LDLC SERPL CALC-MCNC: 132 MG/DL (ref 0–100)
MCH RBC QN AUTO: 28 PG (ref 26.8–34.3)
MCHC RBC AUTO-ENTMCNC: 32.4 G/DL (ref 31.4–37.4)
MCV RBC AUTO: 87 FL (ref 82–98)
P AXIS: 56 DEGREES
PLATELET # BLD AUTO: 187 THOUSANDS/UL (ref 149–390)
PMV BLD AUTO: 11.1 FL (ref 8.9–12.7)
POTASSIUM SERPL-SCNC: 4 MMOL/L (ref 3.5–5.3)
PR INTERVAL: 166 MS
QRS AXIS: -2 DEGREES
QRSD INTERVAL: 86 MS
QT INTERVAL: 368 MS
QTC INTERVAL: 419 MS
RBC # BLD AUTO: 4.35 MILLION/UL (ref 3.81–5.12)
SODIUM SERPL-SCNC: 143 MMOL/L (ref 136–145)
T WAVE AXIS: 38 DEGREES
TRIGL SERPL-MCNC: 282 MG/DL
VENTRICULAR RATE: 78 BPM
WBC # BLD AUTO: 4.85 THOUSAND/UL (ref 4.31–10.16)

## 2019-02-14 PROCEDURE — 93010 ELECTROCARDIOGRAM REPORT: CPT | Performed by: INTERNAL MEDICINE

## 2019-02-14 PROCEDURE — 99225 PR SBSQ OBSERVATION CARE/DAY 25 MINUTES: CPT | Performed by: HOSPITALIST

## 2019-02-14 PROCEDURE — G8978 MOBILITY CURRENT STATUS: HCPCS

## 2019-02-14 PROCEDURE — 80061 LIPID PANEL: CPT | Performed by: PHYSICIAN ASSISTANT

## 2019-02-14 PROCEDURE — 97167 OT EVAL HIGH COMPLEX 60 MIN: CPT

## 2019-02-14 PROCEDURE — G8988 SELF CARE GOAL STATUS: HCPCS

## 2019-02-14 PROCEDURE — 85027 COMPLETE CBC AUTOMATED: CPT | Performed by: PHYSICIAN ASSISTANT

## 2019-02-14 PROCEDURE — 80048 BASIC METABOLIC PNL TOTAL CA: CPT | Performed by: PHYSICIAN ASSISTANT

## 2019-02-14 PROCEDURE — 99244 OFF/OP CNSLTJ NEW/EST MOD 40: CPT | Performed by: PSYCHIATRY & NEUROLOGY

## 2019-02-14 PROCEDURE — G8979 MOBILITY GOAL STATUS: HCPCS

## 2019-02-14 PROCEDURE — G8987 SELF CARE CURRENT STATUS: HCPCS

## 2019-02-14 PROCEDURE — 97163 PT EVAL HIGH COMPLEX 45 MIN: CPT

## 2019-02-14 PROCEDURE — 83036 HEMOGLOBIN GLYCOSYLATED A1C: CPT | Performed by: PHYSICIAN ASSISTANT

## 2019-02-14 PROCEDURE — 99244 OFF/OP CNSLTJ NEW/EST MOD 40: CPT | Performed by: PHYSICAL MEDICINE & REHABILITATION

## 2019-02-14 RX ORDER — ACETAMINOPHEN 325 MG/1
650 TABLET ORAL EVERY 8 HOURS PRN
Status: DISCONTINUED | OUTPATIENT
Start: 2019-02-14 | End: 2019-02-15 | Stop reason: HOSPADM

## 2019-02-14 RX ORDER — BUTALBITAL, ACETAMINOPHEN AND CAFFEINE 50; 325; 40 MG/1; MG/1; MG/1
1 TABLET ORAL EVERY 6 HOURS PRN
Status: DISCONTINUED | OUTPATIENT
Start: 2019-02-14 | End: 2019-02-15 | Stop reason: HOSPADM

## 2019-02-14 RX ORDER — MAGNESIUM SULFATE 1 G/100ML
1 INJECTION INTRAVENOUS DAILY
Status: DISCONTINUED | OUTPATIENT
Start: 2019-02-14 | End: 2019-02-15 | Stop reason: HOSPADM

## 2019-02-14 RX ADMIN — GABAPENTIN 100 MG: 100 CAPSULE ORAL at 08:22

## 2019-02-14 RX ADMIN — POTASSIUM CHLORIDE 20 MEQ: 1500 TABLET, EXTENDED RELEASE ORAL at 08:22

## 2019-02-14 RX ADMIN — ENOXAPARIN SODIUM 40 MG: 40 INJECTION SUBCUTANEOUS at 08:22

## 2019-02-14 RX ADMIN — BRIMONIDINE TARTRATE 1 DROP: 1.5 SOLUTION OPHTHALMIC at 21:08

## 2019-02-14 RX ADMIN — BUTALBITAL, ACETAMINOPHEN AND CAFFEINE 1 TABLET: 50; 325; 40 TABLET ORAL at 19:21

## 2019-02-14 RX ADMIN — ACETAMINOPHEN 650 MG: 325 TABLET, FILM COATED ORAL at 14:58

## 2019-02-14 RX ADMIN — MIDODRINE HYDROCHLORIDE 10 MG: 5 TABLET ORAL at 17:02

## 2019-02-14 RX ADMIN — ATORVASTATIN CALCIUM 40 MG: 40 TABLET, FILM COATED ORAL at 17:02

## 2019-02-14 RX ADMIN — GABAPENTIN 100 MG: 100 CAPSULE ORAL at 17:02

## 2019-02-14 RX ADMIN — GABAPENTIN 100 MG: 100 CAPSULE ORAL at 21:07

## 2019-02-14 RX ADMIN — MIDODRINE HYDROCHLORIDE 10 MG: 5 TABLET ORAL at 12:04

## 2019-02-14 RX ADMIN — BRIMONIDINE TARTRATE 1 DROP: 1.5 SOLUTION OPHTHALMIC at 08:23

## 2019-02-14 RX ADMIN — MELATONIN 3 MG: at 21:07

## 2019-02-14 RX ADMIN — PANTOPRAZOLE SODIUM 40 MG: 40 TABLET, DELAYED RELEASE ORAL at 05:36

## 2019-02-14 RX ADMIN — CALCIUM 2 TABLET: 500 TABLET ORAL at 08:22

## 2019-02-14 RX ADMIN — ASPIRIN 81 MG: 81 TABLET, COATED ORAL at 08:22

## 2019-02-14 RX ADMIN — DULOXETINE HYDROCHLORIDE 60 MG: 60 CAPSULE, DELAYED RELEASE ORAL at 17:03

## 2019-02-14 RX ADMIN — BRIMONIDINE TARTRATE 1 DROP: 1.5 SOLUTION OPHTHALMIC at 17:02

## 2019-02-14 RX ADMIN — DULOXETINE HYDROCHLORIDE 60 MG: 60 CAPSULE, DELAYED RELEASE ORAL at 08:22

## 2019-02-14 RX ADMIN — MAGNESIUM SULFATE HEPTAHYDRATE 1 G: 1 INJECTION, SOLUTION INTRAVENOUS at 11:40

## 2019-02-14 RX ADMIN — MIDODRINE HYDROCHLORIDE 10 MG: 5 TABLET ORAL at 08:22

## 2019-02-14 NOTE — OCCUPATIONAL THERAPY NOTE
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    Right foot pain    Radiculopathy of lumbar region    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[facility] [address] )    Focal neurological deficit    GERD (gastroesophageal reflux disease)    Cervical myelopathy with cervical radiculopathy    History of recent intraspinal surgery    Autonomic orthostatic hypotension    History of migraine headaches    Mixed dyslipidemia     Past Medical History  Past Medical History:   Diagnosis Date    Anesthesia complication     difficult to wake up    GERD (gastroesophageal reflux disease)     Lazy eye     resolved: 3/27/17    Osteopenia     with joint pain-elevated DEV    Tinnitus     Wears glasses     for reading     Past Surgical History  Past Surgical History:   Procedure Laterality Date    ABDOMINAL SURGERY      lysis of adhesions x 2    APPENDECTOMY      CHOLECYSTECTOMY      open    DILATION AND CURETTAGE OF UTERUS      NEUROMA EXCISION Right 5/26/2017    Procedure: EXCISION MASS / FIBROMA FOOT;  Surgeon: Zhang Ayala DPM;  Location: [facility] OR;  Service:     RIGHT OOPHORECTOMY      WISDOM TOOTH EXTRACTION      x4        02/14/19 8125   Note Type   Note type Eval/Treat   Restrictions/Precautions   Weight Bearing Precautions Per Order No   Braces or Orthoses Other (Comment)  (aspen cervical collar)   Other Precautions Pain; Fall Risk;Spinal precautions; Visual impairment   Pain Assessment   Pain Assessment No/denies pain   Home Living   Type of Home House; Other (Comment)  (2 RODOLFO 1 RODOLFO)   Home Layout One level; Laundry in basement; Other (Comment)  (2 RODOLFO)   Bathroom Shower/Tub Tub/shower unit   Bathroom Toilet Standard   Bathroom Equipment Shower chair;Grab bars in shower   [address] Walker;Cane;Grab bars; Reacher   Additional Comments Pt reports living in 1 [address] [place] [address] w/ 2 RODOLFO PTA w/ her  and 2 dogs   Prior Function   Level of [place] Needs assistance with IADLs   Lives With Spouse   Receives Help From Family; Other (Comment)  (Home PT/OT)   ADL Assistance   (mod I using AE to complete dressing, grooming)   IADLs Needs assistance  (- drive since September)   Falls in the last 6 months 0   Vocational Other (Comment)  (pt had to resign / retire as unable to return after 2 weeks)   Comments Pt reports able to complete ADL w/ mod I wearing Aspen collar at home since surgery 1/1/2019  Pt is not able to to drive, and needs assistance w/ IADL  Pt reports was receiving home PT/OT   Lifestyle   Autonomy Pt reports mod I w/ ADL PTA using cane for functional mobiltiy w/ in carpeted house  Pt reports that she was going up / down stairs for laundry on good day w/  present  Pt reports she was going to the store w/ her   Reciprocal Relationships Pt reports supportive  who works full time  Pt added that her  is at home sick w/ the flu   Service to Others 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   Intrinsic Gratification Pt reports enoying her games on her phone and her watching her three grand daughters activities   Psychosocial   Patient Behaviors/Mood Labile;Crying  (anxious, emotional; afraid of falling)   Subjective   Subjective "They are sending me home today, and I feel very weak"   ADL   Eating Assistance 5  Supervision/Setup   Eating Deficit Setup; Increased time to complete   Grooming Assistance 4  Minimal Assistance   Grooming Deficit Standing with assistive device; Increased time to complete;Verbal cueing;Steadying;Setup   UB Bathing Assistance Unable to assess   LB Bathing Assistance Unable to assess   UB Dressing Assistance 4  Minimal Assistance   UB Dressing Deficit Pull around back; Fasteners; Increased time to complete;Setup   LB Dressing Assistance 4  Minimal Assistance   LB Dressing Deficit Setup; Increased time to complete;Steadying; Requires assistive device for steadying; Fasteners;Pull up over hips;Don/doff R sock; Don/doff L sock   Additional Comments required assist to mainitain balance in standing   Bed Mobility   Supine to Sit Unable to assess   Sit to Supine Unable to assess   Additional Comments Pt seated OOB in chair upon arrival and post w/ call bell in reach and needs met  Transfers   Sit to Stand 4  Minimal assistance   Additional items Assist x 1; Armrests; Increased time required   Stand to Sit 4  Minimal assistance   Additional items Assist x 1; Increased time required;Verbal cues;Armrests   Additional Comments Pt completed sit <> stand 4 times during eval requiring consistent min A   Functional Mobility   Functional Mobility 4  Minimal assistance   Additional Comments min A x1 using RW for functional mobility and assist of 2nd for chair follow, safety  Pt benefits from cues to focus visual gaze on one spot   Additional items Rolling walker;SPC   Balance   Static Sitting Fair +   Static Standing Poor +   Ambulatory Poor +   Activity Tolerance   Activity Tolerance Patient limited by fatigue;Treatment limited secondary to medical complications (Comment)  (tremors)   Medical Staff Made Aware spoke to Shira العلي MD, Dr Ольга Fernández; Veronica Hassan, Neuro PA   Nurse Made Aware spoke to RNPia Assessment   RUE Assessment WFL  (limited end ROM shoulder, able to complete ADL)   RUE Strength   RUE Overall Strength Within Functional Limits - able to perform ADL tasks with strength; Other (Comment)  (shoulder 3+/5)   LUE Assessment   LUE Assessment X  (AROM limited end range shoulder, able to complete ADL)   LUE Strength   LUE Overall Strength Deficits; Other (Comment)  (grasp 3-/5, elbow 3-/5; shoulder 3/5)   Hand Function   Gross Motor Coordination Impaired  (limited B coordination L UE deficits)   Fine Motor Coordination Impaired  (R hand dominance; R UE tremor)   Sensation   Light Touch No apparent deficits   Sharp/Dull No apparent deficits   Additional Comments tremor R UE/LE   Vision-Basic Assessment   Current Vision Wears glasses only for reading   Vision - Complex Assessment   Ocular Range of Motion WFL   Head Position WDL   Tracking Decreased smoothness of horizontal tracking;Decreased smoothness of vertical tracking  (to the R)   Saccades Decreased speed of pursuit between targets   Visual Fields Difficulty detecting stimulus with OD RLQ   Acuity Able to read clock/calendar on wall without difficulty   Additional Comments pt reports difficulty detecting / seeing things on R side  Perception   Inattention/Neglect Appears intact   Motor Planning Appears intact   Perseveration Not present   Cognition   Overall Cognitive Status WFL   Arousal/Participation Alert; Cooperative   Attention Within functional limits   Orientation Level Oriented X4   Memory Within functional limits   Following Commands Follows all commands and directions without difficulty   Comments Identified pt by full name and birthdate  Pt able to provide social history and participate in conversation   Assessment   Limitation Decreased ADL status; Decreased UE strength;Decreased endurance;Decreased high-level ADLs; Decreased self-care trans;Decreased fine motor control   Assessment Pt is a 64yo female admitted to [facility] on 2/13/2019  Pt presents w/ focal neurological deficits and significant PMH impacting her occupational performance including anterior cervical diskectomy and fusion C3-4, C4-5 on 1/1/2019, cervical myelopathy w/ cervical radiculopathy, visual impairment (lazy R eye- premorbid)   Pt reports living w/ her  in [facility] w/ 2 RODOLFO PTA  Pt reports mod I w/ ADL PTA using cane and  assist w/ IADL, driving  Upon eval, pt required min A x1 to complete sit <> stand and functional mobility using RW w/ assist of 2nd for chair follow  Pt completed LBD w/ min A  Pt alert and oriented, and able to participate in conversation appropriately to provide social history and discuss interests  Pt reports R hand dominance and presents w/ R UE /LE tremor impacting her activity tolerance and I w/ ADL performance  Pt presents w/ decreased standing balance, decreased L UE strength, decreased R UE coordination, decreased vision, decreased standing tolerance, decreased endurance impacting her I and safety w/ bathing, dressing, oral hygiene, functional mobility, functional transfers, activity engagement, clothing mgmt, food prep / clean up  Pt would benefit from OT while in acute care to address deficits  From an OT perspective, pt would benefit from post acute rehab when medically stable for discharge from acute care  Will continue to follow   Goals   Patient Goals Pt stated that she wants to be able to go home and eventually return to work   Plan   Treatment Interventions ADL retraining;Functional transfer training;UE strengthening/ROM; Endurance training;Neuromuscular reeducation; Fine motor coordination activities; Compensatory technique education;Continued evaluation; Activityengagement   Goal Expiration Date 02/21/19   OT Frequency 3-5x/wk   Recommendation   OT Discharge Recommendation Other (Comment)  (to post acute rehab)   Equipment Recommended Bedside commode;Tub seat with back; Other (comment)  (commode vs raised toilet w/ handles)   OT - OK to Discharge   (to post acute rehab at this time)   Barthel Index   Feeding 10   Bathing 0   Grooming Score 5   Dressing Score 5   Bladder Score 10   Bowels Score 10   Toilet Use Score 5   Transfers (Bed/Chair) Score 10   Mobility (Level Surface) Score 0   Stairs Score 0   Barthel Index Score 55   Modified [place] Scale   Modified [place] Scale 4      Pt goals to be met by 2/21/2019:  1  Pt will complete functional transfers to bed, chair, and toilet using least restrictive device w/ mod I to max I w/ ADL performance  2  Pt will complete UBD w/ mod I after set- up  3  Pt will complete LBD w/ mod I after set- up  4  Pt will demonstrate increased UE strength and coordination to consistently manage fasteners, grooming supplies to be able to return to PLOF and prepare meals  5  Pt will demonstrate increased functional standing tolerance for at least 15 minutes using least restrictive device while engaged in grooming to max I w/ functional mobility to return to PLOF w/   6   Pt will complete bed mobility supine <> sit w/ mod I while demonstrating understanding of spinal precautions    JV Holt/JUMANA

## 2019-02-14 NOTE — CONSULTS
PHYSICAL MEDICINE AND REHABILITATION CONSULT NOTE  Yumiko Barros 59 y o  female MRN: [id number]  Unit/Bed#: -01 Encounter: [id number]    Requested by (Physician/Service): Levi Gomes MD  Reason for Consultation:  Assessment of rehabilitation needs  Reason for Hospitalization:  Right sided temporal headache [R51]  Headache [R51]  Peripheral visual field defect, right [H53 451]      History of Present Illness:  Yumiko Barros is a 59 y o  female who  has a past medical history of Anesthesia complication, GERD (gastroesophageal reflux disease), Lazy eye, Osteopenia, Tinnitus, and Wears glasses  who presented to the [address] [place] [address] with right temporal pain and right temple vision loss which the the started on Monday  Her vision loss has improved since then but  has not yet resolved  MRI of the brain revealed no acute infarct  PM&R consulted for rehabilitation recommendations  Restrictions include:  none    Hospital Complications/Comorbidities:   Complications: As above  Comorbidities: As above    Morbid Obesity (BMI > 40) No     Last Weight Last BMI   Wt Readings from Last 1 Encounters:   02/13/19 74 6 kg (164 lb 7 4 oz)    Body mass index is 28 23 kg/m²  Functional History:     Prior to Admission:  Needs assistance with IADLs  Present:  Physical Therapy Occupational Therapy Speech Therapy   Minimal assistance with transfers, minimal assistance with gait Supervision with eating, minimal assistance with grooming, minimal assistance with upper body dressing and minimal assistance with lower body dressing   Tolerating regular diet     Past Medical History:   Past Surgical History:   Family History:     Past Medical History:   Diagnosis Date    Anesthesia complication     difficult to wake up    GERD (gastroesophageal reflux disease)     Lazy eye     resolved: 3/27/17    Osteopenia     with joint pain-elevated DEV    Tinnitus     Wears glasses     for reading    Past Surgical History:   Procedure Laterality Date    ABDOMINAL SURGERY      lysis of adhesions x 2    APPENDECTOMY      CHOLECYSTECTOMY      open    DILATION AND CURETTAGE OF UTERUS      NEUROMA EXCISION Right 5/26/2017    Procedure: EXCISION MASS / FIBROMA FOOT;  Surgeon: Zhang Ayala DPM;  Location: [facility];  Service:     RIGHT OOPHORECTOMY      WISDOM TOOTH EXTRACTION      x4     Family History   Problem Relation Age of Onset    Heart disease Mother     Stroke Mother 58    COPD Mother     Arthritis Mother     Hypertension Father     Kidney disease Brother         kidney transplant     - No family history of neurologic diseases        Medications:    Current Facility-Administered Medications:     ALPRAZolam (XANAX) tablet 0 5 mg, 0 5 mg, Oral, TID PRN, Dyana Donahue PA-C    aspirin (ECOTRIN LOW STRENGTH) EC tablet 81 mg, 81 mg, Oral, Daily, Dyana Donahue PA-C, 81 mg at 02/14/19 7901    atorvastatin (LIPITOR) tablet 40 mg, 40 mg, Oral, Daily With Ezio Nieto PA-C    brimonidine (ALPHAGAN P) 0 15 % ophthalmic solution 1 drop, 1 drop, Right Eye, TID, Dyana Donahue PA-C, 1 drop at 02/14/19 6615    butalbital-acetaminophen-caffeine (FIORICET,ESGIC) -40 mg per tablet 1 tablet, 1 tablet, Oral, Q4H PRN, Dyana Donahue PA-C, 1 tablet at 02/13/19 2323    calcium carbonate (OYSTER SHELL,OSCAL) 500 mg tablet 2 tablet, 2 tablet, Oral, Daily With Breakfast, Dyana Donahue PA-C, 2 tablet at 02/14/19 3042    cyclobenzaprine (FLEXERIL) tablet 10 mg, 10 mg, Oral, TID PRN, Dyana Donahue PA-C    DULoxetine (CYMBALTA) delayed release capsule 60 mg, 60 mg, Oral, BID, Dyana Donahue PA-C, 60 mg at 02/14/19 8931    enoxaparin (LOVENOX) subcutaneous injection 40 mg, 40 mg, Subcutaneous, Daily, Dyana Donahue PA-C, 40 mg at 02/14/19 4751    gabapentin (NEURONTIN) capsule 100 mg, 100 mg, Oral, TID, Dyana Donahue PA-C, 100 mg at 02/14/19 1792    magnesium sulfate IVPB (premix) SOLN 1 g, 1 g, Intravenous, Daily, Ana María Dye, CRNP, Last Rate: 50 mL/hr at 02/14/19 1140, 1 g at 02/14/19 1140    melatonin tablet 3 mg, 3 mg, Oral, HS, Nick Hind, PA-C, 3 mg at 02/13/19 2318    midodrine (PROAMATINE) tablet 10 mg, 10 mg, Oral, TID With Meals, Nick Hind, PA-C, 10 mg at 02/14/19 1204    ondansetron (ZOFRAN) injection 4 mg, 4 mg, Intravenous, Q8H PRN, Nick Hind, PA-C    oxyCODONE (ROXICODONE) IR tablet 5 mg, 5 mg, Oral, Q4H PRN, Nick Hind, PA-C    pantoprazole (PROTONIX) EC tablet 40 mg, 40 mg, Oral, Early Morning, Nick Hind, PA-C, 40 mg at 02/14/19 0536    potassium chloride (K-DUR,KLOR-CON) CR tablet 20 mEq, 20 mEq, Oral, Daily, Nick Hind, PA-C, 20 mEq at 02/14/19 0435    senna (SENOKOT) tablet 8 6 mg, 1 tablet, Oral, HS PRN, Nick Hind, PA-C    Allergies:    Allergies   Allergen Reactions    Demerol [Meperidine] Lightheadedness     Reaction Date: 11Jan2006;     Sulfa Antibiotics Hives     Reaction Date: 11Jan2006;     Toradol [Ketorolac Tromethamine]         Social History:    Social History     Socioeconomic History    Marital status: /Civil Union     Spouse name: None    Number of children: None    Years of education: None    Highest education level: None   Occupational History    None   Social Needs    Financial resource strain: None    Food insecurity:     Worry: None     Inability: None    Transportation needs:     Medical: None     Non-medical: None   Tobacco Use    Smoking status: Never Smoker    Smokeless tobacco: Never Used   Substance and Sexual Activity    Alcohol use: No    Drug use: No    Sexual activity: Not Currently   Lifestyle    Physical activity:     Days per week: None     Minutes per session: None    Stress: None   Relationships    Social connections:     Talks on phone: None     Gets together: None     Attends [employer] service: None     Active member of club or organization: None     Attends meetings of clubs or organizations: None     Relationship status: None    Intimate partner violence:     Fear of current or ex partner: None     Emotionally abused: None     Physically abused: None     Forced sexual activity: None   Other Topics Concern    None   Social History Narrative    None        Venora Yesenia lives in a 1 level house with 2 steps to enter    Review of Systems: A 10-point review of systems was performed  Negative except as listed above  Physical Exam:  Vital Signs:      Temp:  [97 4 °F (36 3 °C)-97 8 °F (36 6 °C)] 97 4 °F (36 3 °C)  HR:  [] 72  Resp:  [16-20] 18  BP: ()/(63-92) 125/69   Intake/Output Summary (Last 24 hours) at 2/14/2019 1358  Last data filed at 2/14/2019 1352  Gross per 24 hour   Intake    Output 400 ml   Net -400 ml        Laboratory:      Lab Results   Component Value Date    HGB 12 2 02/14/2019    HGB 13 3 03/29/2017    HCT 37 7 02/14/2019    HCT 39 9 03/29/2017    WBC 4 85 02/14/2019    WBC 4 7 03/29/2017     Lab Results   Component Value Date    BUN 12 02/14/2019    BUN 12 08/04/2016     08/04/2016    K 4 0 02/14/2019    K 5 3 (H) 08/04/2016     02/14/2019     08/04/2016    GLUCOSE 95 08/04/2016    CREATININE 0 83 02/14/2019    CREATININE 0 81 08/04/2016     Lab Results   Component Value Date    PROTIME 13 0 02/13/2019    INR 1 01 02/13/2019        General: alert, no apparent distress, cooperative and comfortable  Head: Normal, normocephalic, atraumatic  Eye: Normal external eye, conjunctiva, lids   Ears: Normal external ears  Nose: Normal external nose, mucus membranes  Pharynx: Dental Hygiene adequate  Normal buccal mucosa  Normal pharynx  Neck / Thyroid: Supple, no masses, nodes, nodules or enlargement    Pulmonary: clear to auscultation bilaterally and no crackles, no wheezes, chest expansion normal  Cardiovascular: normal rate, regular rhythm, normal S1, S2, no murmurs, rubs, clicks or gallops  Abdomen: soft, nontender, nondistended, no masses or organomegaly  Skin/Extremity: no rashes, no erythema, no peripheral edema  The  Neurologic:  Cranial nerves 2-12 are intact except pupils are equal but reactive  Sensations are intact to light touch bilaterally, coordination is intact bilaterally, deep tendon reflexes are slightly asymmetric, plantars are downgoing bilaterally  Psych: Appropriate affect, alert and oriented to person, place and time   Musculoskeletal - Strength:   Right  Left  Site  Right  Left  Site    5 5  S Ab: Shoulder Abductors  5  5  HF: Hip Flexors    5 5  EF: Elbow Flexors  5 5 KF: Knee Flexors    5  5  EE: Elbow Extensors  5  5  KE: Knee Extensors    5  5  WE: Wrist Extensors  5  5  DR: Dorsi Flexors    5  5  FF: Finger Flexors  5  5  PF: Plantar Flexors    5  5  HI: Hand Intrinsics  5  5  EHL: Extensor Hallucis Longus         Imaging: Reviewed  Ct Head Without Contrast    Result Date: 2/13/2019  Impression: No acute intracranial abnormality  Workstation performed: [id number]     Mri Brain W Wo Contrast    Result Date: 2/13/2019  Impression: 1  No evidence of acute infarct, hemorrhage or mass  2   Few white matter lesions in the lachelle may represent microangiopathic change  Workstation performed: [id number]       Assessment and Recommendations:    32-year-old female with history of migraine headaches the presents with right-sided temporal headache and right eye visual loss  CT head as well as CTA were normal   Impairments:  Impaired functional mobility and ability to perform ADL's      Recommendations:  - Chart reviewed  - Imaging reviewed   - Continue PT/OT while inpatient        - Pt is unable to safely return home until she is at the supervision/contact-guard functional level (25% assistance level with or without a device)  modified-independent functional level (0% assistance with a device) independent functional level (0% assistance without a device)      -If slow to progress in PT will benefit from short-term acute rehab when medically stable      - Disposition unclear at this point in time but inpatient rehab a possibility in the near future pending achievement of clinically stability, potential for functional progress  Thank you for allowing the PM&R service to participate in the care of this patient  We will continue to follow Jazz Stephen progress with you  Please do not hesitate to call with questions or concerns    ** Please Note: Fluency Direct voice to text software may have been used in the creation of this document   **

## 2019-02-14 NOTE — ASSESSMENT & PLAN NOTE
Long history of cervical radicular symptoms in known cord compromise for which surgery was Done 6 weeks ago  At surgery cord compression noted with acute deterioration with upper and lower extremity deficits improved after surgery she is doing well post rehab  I suspect though she has residual right-sided symptoms which may be variable depending on her level of hydration and sleep

## 2019-02-14 NOTE — ASSESSMENT & PLAN NOTE
The patient reports a longstanding history of classic migraines  She reports she has not had a migraine and possibly 6 years or so

## 2019-02-14 NOTE — PLAN OF CARE
Problem: Potential for Falls  Goal: Patient will remain free of falls  Description  INTERVENTIONS:  - Assess patient frequently for physical needs  -  Identify cognitive and physical deficits and behaviors that affect risk of falls  -  [place] fall precautions as indicated by assessment   - Educate patient/family on patient safety including physical limitations  - Instruct patient to call for assistance with activity based on assessment  - Modify environment to reduce risk of injury  - Consider OT/PT consult to assist with strengthening/mobility  Outcome: Progressing     Problem: Nutrition/Hydration-ADULT  Goal: Nutrient/Hydration intake appropriate for improving, restoring or maintaining nutritional needs  Description  Monitor and assess patient's nutrition/hydration status for malnutrition (ex- brittle hair, bruises, dry skin, pale skin and conjunctiva, muscle wasting, smooth red tongue, and disorientation)  Collaborate with interdisciplinary team and initiate plan and interventions as ordered  Monitor patient's weight and dietary intake as ordered or per policy  Utilize nutrition screening tool and intervene per policy  Determine patient's food preferences and provide high-protein, high-caloric foods as appropriate       INTERVENTIONS:  - Monitor oral intake, urinary output, labs, and treatment plans  - Assess nutrition and hydration status and recommend course of action  - Evaluate amount of meals eaten  - Assist patient with eating if necessary   - Allow adequate time for meals  - Recommend/ encourage appropriate diets, oral nutritional supplements, and vitamin/mineral supplements  - Order, calculate, and assess calorie counts as needed  - Recommend, monitor, and adjust tube feedings and TPN/PPN based on assessed needs  - Assess need for intravenous fluids  - Provide specific nutrition/hydration education as appropriate  - Include patient/family/caregiver in decisions related to nutrition  Outcome: Progressing     Problem: NEUROSENSORY - ADULT  Goal: Achieves stable or improved neurological status  Description  INTERVENTIONS  - Monitor and report changes in neurological status  - Initiate measures to prevent increased intracranial pressure  - Maintain blood pressure and fluid volume within ordered parameters to optimize cerebral perfusion  - Monitor temperature, glucose, and sodium or any other associated labs   Initiate appropriate interventions as ordered  - Monitor for seizure activity   - Administer anti-seizure medications as ordered  Outcome: Progressing  Goal: Absence of seizures  Description  INTERVENTIONS  - Monitor for seizure activity  - Administer anti-seizure medications as ordered  - Monitor neurological status  Outcome: Progressing  Goal: Remains free of injury related to seizures activity  Description  INTERVENTIONS  - Maintain airway, patient safety  and administer oxygen as ordered  - Monitor patient for seizure activity, document and report duration and description of seizure to physician/advanced practitioner  - If seizure occurs,  ensure patient safety during seizure  - Reorient patient post seizure  - Seizure pads on all 4 side rails  - Instruct patient/family to notify RN of any seizure activity including if an aura is experienced  - Instruct patient/family to call for assistance with activity based on nursing assessment  - Administer anti-seizure medications as ordered  - Monitor fetal well being  Outcome: Progressing  Goal: Achieves maximal functionality and self care  Description  INTERVENTIONS  - Monitor swallowing and airway patency with patient fatigue and changes in neurological status  - Encourage and assist patient to increase activity and self care with guidance from rehab services  - Encourage visually impaired, hearing impaired and aphasic patients to use assistive/communication devices  Outcome: Progressing     Problem: Neurological Deficit  Goal: Neurological status is stable or improving  Description  Interventions:  - Monitor and assess patient's level of consciousness, motor function, sensory function, and level of assistance needed for ADLs  - Monitor and report changes from baseline  Collaborate with interdisciplinary team to initiate plan and implement interventions as ordered  - Provide and maintain a safe environment  - Utilize seizure precautions  - Utilize fall precautions  - Utilize aspiration precautions  - Utilize bleeding precautions  Outcome: Progressing     Problem: Activity Intolerance/Impaired Mobility  Goal: Mobility/activity is maintained at optimum level for patient  Description  Interventions:  - Assess and monitor patient  barriers to mobility and need for assistive/adaptive devices  - Assess patient's emotional response to limitations  - Collaborate with interdisciplinary team and initiate plans and interventions as ordered  - Encourage independent activity per ability   - Maintain proper body alignment  - Perform active/passive rom as tolerated/ordered  - Plan activities to conserve energy   - Turn patient  Outcome: Progressing     Problem: Potential for Aspiration  Goal: Non-ventilated patient's risk of aspiration is minimized  Description  Assess and monitor vital signs, respiratory status, and labs (WBC)  Monitor for signs of aspiration (tachypnea, cough, rales, wheezing, cyanosis, fever)  - Assess and monitor patient's ability to swallow  - Place patient up in chair to eat if possible  - HOB up at 90 degrees to eat if unable to get patient up into chair   - Supervise patient during oral intake  - Instruct patient to take small bites  - Instruct patient to take small single sips when taking liquids    - Follow patient-specific strategies generated by speech pathologist   Outcome: Progressing  Goal: Ventilated patient's risk of aspiration is minimized  Description  Assess and monitor vital signs, respiratory status, airway cuff pressure, and labs (WBC)  Monitor for signs of aspiration (tachypnea, cough, rales, wheezing, cyanosis, fever)  - Elevate head of bed 30 degrees if patient has tube feeding   - Monitor tube feeding  Outcome: Progressing     Problem: Nutrition  Goal: Nutrition/Hydration status is improving  Description  Monitor and assess patient's nutrition/hydration status for malnutrition (ex- brittle hair, bruises, dry skin, pale skin and conjunctiva, muscle wasting, smooth red tongue, and disorientation)  Collaborate with interdisciplinary team and initiate plan and interventions as ordered  Monitor patient's weight and dietary intake as ordered or per policy  Utilize nutrition screening tool and intervene per policy  Determine patient's food preferences and provide high-protein, high-caloric foods as appropriate  - Assist patient with eating   - Allow adequate time for meals   - Encourage patient to take dietary supplement as ordered  - Collaborate with clinical nutritionist   - Include patient/family/caregiver in decisions related to nutrition    Outcome: Progressing     Problem: SAFETY ADULT  Goal: Maintain or return to baseline ADL function  Description  INTERVENTIONS:  -  Assess patient's ability to carry out ADLs; assess patient's baseline for ADL function and identify physical deficits which impact ability to perform ADLs (bathing, care of mouth/teeth, toileting, grooming, dressing, etc )  - Assess/evaluate cause of self-care deficits   - Assess range of motion  - Assess patient's mobility; develop plan if impaired  - Assess patient's need for assistive devices and provide as appropriate  - Encourage maximum independence but intervene and supervise when necessary  ¯ Involve family in performance of ADLs  ¯ Assess for home care needs following discharge   ¯ Request OT consult to assist with ADL evaluation and planning for discharge  ¯ Provide patient education as appropriate  Outcome: Progressing  Goal: Maintain or return mobility status to optimal level  Description  INTERVENTIONS:  - Assess patient's baseline mobility status (ambulation, transfers, stairs, etc )    - Identify cognitive and physical deficits and behaviors that affect mobility  - Identify mobility aids required to assist with transfers and/or ambulation (gait belt, sit-to-stand, lift, walker, cane, etc )  - [place] fall precautions as indicated by assessment  - Record patient progress and toleration of activity level on Mobility SBAR; progress patient to next Phase/Stage  - Instruct patient to call for assistance with activity based on assessment  - Request Rehabilitation consult to assist with strengthening/weightbearing, etc   Outcome: Progressing     Problem: DISCHARGE PLANNING  Goal: Discharge to home or other facility with appropriate resources  Description  INTERVENTIONS:  - Identify barriers to discharge w/patient and caregiver  - Arrange for needed discharge resources and transportation as appropriate  - Identify discharge learning needs (meds, wound care, etc )  - Arrange for interpretive services to assist at discharge as needed  - Refer to Case Management Department for coordinating discharge planning if the patient needs post-hospital services based on physician/advanced practitioner order or complex needs related to functional status, cognitive ability, or social support system  Outcome: Progressing     Problem: Knowledge Deficit  Goal: Patient/family/caregiver demonstrates understanding of disease process, treatment plan, medications, and discharge instructions  Description  Complete learning assessment and assess knowledge base    Interventions:  - Provide teaching at level of understanding  - Provide teaching via preferred learning methods  Outcome: Progressing     Problem: DISCHARGE PLANNING - CARE MANAGEMENT  Goal: Discharge to post-acute care or home with appropriate resources  Description  INTERVENTIONS:  - Conduct assessment to determine patient/family and health care team treatment goals, and need for post-acute services based on payer coverage, community resources, and patient preferences, and barriers to discharge  - Address psychosocial, clinical, and financial barriers to discharge as identified in assessment in conjunction with the patient/family and health care team  - Arrange appropriate level of post-acute services according to patient?s   needs and preference and payer coverage in collaboration with the physician and health care team  - Communicate with and update the patient/family, physician, and health care team regarding progress on the discharge plan  - Arrange appropriate transportation to post-acute venues  Outcome: Progressing

## 2019-02-14 NOTE — PLAN OF CARE
Problem: Potential for Falls  Goal: Patient will remain free of falls  Description  INTERVENTIONS:  - Assess patient frequently for physical needs  -  Identify cognitive and physical deficits and behaviors that affect risk of falls  -  [place] fall precautions as indicated by assessment   - Educate patient/family on patient safety including physical limitations  - Instruct patient to call for assistance with activity based on assessment  - Modify environment to reduce risk of injury  - Consider OT/PT consult to assist with strengthening/mobility  Outcome: Progressing     Problem: Nutrition/Hydration-ADULT  Goal: Nutrient/Hydration intake appropriate for improving, restoring or maintaining nutritional needs  Description  Monitor and assess patient's nutrition/hydration status for malnutrition (ex- brittle hair, bruises, dry skin, pale skin and conjunctiva, muscle wasting, smooth red tongue, and disorientation)  Collaborate with interdisciplinary team and initiate plan and interventions as ordered  Monitor patient's weight and dietary intake as ordered or per policy  Utilize nutrition screening tool and intervene per policy  Determine patient's food preferences and provide high-protein, high-caloric foods as appropriate       INTERVENTIONS:  - Monitor oral intake, urinary output, labs, and treatment plans  - Assess nutrition and hydration status and recommend course of action  - Evaluate amount of meals eaten  - Assist patient with eating if necessary   - Allow adequate time for meals  - Recommend/ encourage appropriate diets, oral nutritional supplements, and vitamin/mineral supplements  - Order, calculate, and assess calorie counts as needed  - Recommend, monitor, and adjust tube feedings and TPN/PPN based on assessed needs  - Assess need for intravenous fluids  - Provide specific nutrition/hydration education as appropriate  - Include patient/family/caregiver in decisions related to nutrition  Outcome: Progressing     Problem: NEUROSENSORY - ADULT  Goal: Achieves stable or improved neurological status  Description  INTERVENTIONS  - Monitor and report changes in neurological status  - Initiate measures to prevent increased intracranial pressure  - Maintain blood pressure and fluid volume within ordered parameters to optimize cerebral perfusion  - Monitor temperature, glucose, and sodium or any other associated labs  Initiate appropriate interventions as ordered  - Monitor for seizure activity   - Administer anti-seizure medications as ordered  Outcome: Progressing  Goal: Achieves maximal functionality and self care  Description  INTERVENTIONS  - Monitor swallowing and airway patency with patient fatigue and changes in neurological status  - Encourage and assist patient to increase activity and self care with guidance from rehab services  - Encourage visually impaired, hearing impaired and aphasic patients to use assistive/communication devices  Outcome: Progressing     Problem: Neurological Deficit  Goal: Neurological status is stable or improving  Description  Interventions:  - Monitor and assess patient's level of consciousness, motor function, sensory function, and level of assistance needed for ADLs  - Monitor and report changes from baseline  Collaborate with interdisciplinary team to initiate plan and implement interventions as ordered  - Provide and maintain a safe environment  - Utilize seizure precautions  - Utilize fall precautions  - Utilize aspiration precautions  - Utilize bleeding precautions  Outcome: Progressing     Problem: Activity Intolerance/Impaired Mobility  Goal: Mobility/activity is maintained at optimum level for patient  Description  Interventions:  - Assess and monitor patient  barriers to mobility and need for assistive/adaptive devices  - Assess patient's emotional response to limitations    - Collaborate with interdisciplinary team and initiate plans and interventions as ordered  - Encourage independent activity per ability   - Maintain proper body alignment  - Perform active/passive rom as tolerated/ordered  - Plan activities to conserve energy   - Turn patient  Outcome: Progressing     Problem: Potential for Aspiration  Goal: Non-ventilated patient's risk of aspiration is minimized  Description  Assess and monitor vital signs, respiratory status, and labs (WBC)  Monitor for signs of aspiration (tachypnea, cough, rales, wheezing, cyanosis, fever)  - Assess and monitor patient's ability to swallow  - Place patient up in chair to eat if possible  - HOB up at 90 degrees to eat if unable to get patient up into chair   - Supervise patient during oral intake  - Instruct patient to take small bites  - Instruct patient to take small single sips when taking liquids  - Follow patient-specific strategies generated by speech pathologist   Outcome: Progressing     Problem: Nutrition  Goal: Nutrition/Hydration status is improving  Description  Monitor and assess patient's nutrition/hydration status for malnutrition (ex- brittle hair, bruises, dry skin, pale skin and conjunctiva, muscle wasting, smooth red tongue, and disorientation)  Collaborate with interdisciplinary team and initiate plan and interventions as ordered  Monitor patient's weight and dietary intake as ordered or per policy  Utilize nutrition screening tool and intervene per policy  Determine patient's food preferences and provide high-protein, high-caloric foods as appropriate  - Assist patient with eating   - Allow adequate time for meals   - Encourage patient to take dietary supplement as ordered  - Collaborate with clinical nutritionist   - Include patient/family/caregiver in decisions related to nutrition    Outcome: Progressing     Problem: SAFETY ADULT  Goal: Maintain or return to baseline ADL function  Description  INTERVENTIONS:  -  Assess patient's ability to carry out ADLs; assess patient's baseline for ADL function and identify physical deficits which impact ability to perform ADLs (bathing, care of mouth/teeth, toileting, grooming, dressing, etc )  - Assess/evaluate cause of self-care deficits   - Assess range of motion  - Assess patient's mobility; develop plan if impaired  - Assess patient's need for assistive devices and provide as appropriate  - Encourage maximum independence but intervene and supervise when necessary  ¯ Involve family in performance of ADLs  ¯ Assess for home care needs following discharge   ¯ Request OT consult to assist with ADL evaluation and planning for discharge  ¯ Provide patient education as appropriate  Outcome: Progressing  Goal: Maintain or return mobility status to optimal level  Description  INTERVENTIONS:  - Assess patient's baseline mobility status (ambulation, transfers, stairs, etc )    - Identify cognitive and physical deficits and behaviors that affect mobility  - Identify mobility aids required to assist with transfers and/or ambulation (gait belt, sit-to-stand, lift, walker, cane, etc )  - [place] fall precautions as indicated by assessment  - Record patient progress and toleration of activity level on Mobility SBAR; progress patient to next Phase/Stage  - Instruct patient to call for assistance with activity based on assessment  - Request Rehabilitation consult to assist with strengthening/weightbearing, etc   Outcome: Progressing     Problem: DISCHARGE PLANNING  Goal: Discharge to home or other facility with appropriate resources  Description  INTERVENTIONS:  - Identify barriers to discharge w/patient and caregiver  - Arrange for needed discharge resources and transportation as appropriate  - Identify discharge learning needs (meds, wound care, etc )  - Arrange for interpretive services to assist at discharge as needed  - Refer to Case Management Department for coordinating discharge planning if the patient needs post-hospital services based on physician/advanced practitioner order or complex needs related to functional status, cognitive ability, or social support system  Outcome: Progressing     Problem: Knowledge Deficit  Goal: Patient/family/caregiver demonstrates understanding of disease process, treatment plan, medications, and discharge instructions  Description  Complete learning assessment and assess knowledge base    Interventions:  - Provide teaching at level of understanding  - Provide teaching via preferred learning methods  Outcome: Progressing     Problem: DISCHARGE PLANNING - CARE MANAGEMENT  Goal: Discharge to post-acute care or home with appropriate resources  Description  INTERVENTIONS:  - Conduct assessment to determine patient/family and health care team treatment goals, and need for post-acute services based on payer coverage, community resources, and patient preferences, and barriers to discharge  - Address psychosocial, clinical, and financial barriers to discharge as identified in assessment in conjunction with the patient/family and health care team  - Arrange appropriate level of post-acute services according to patient?s   needs and preference and payer coverage in collaboration with the physician and health care team  - Communicate with and update the patient/family, physician, and health care team regarding progress on the discharge plan  - Arrange appropriate transportation to post-acute venues  Outcome: Progressing     Problem: PAIN - ADULT  Goal: Verbalizes/displays adequate comfort level or baseline comfort level  Description  Interventions:  - Encourage patient to monitor pain and request assistance  - Assess pain using appropriate pain scale  - Administer analgesics based on type and severity of pain and evaluate response  - Implement non-pharmacological measures as appropriate and evaluate response  - Consider cultural and social influences on pain and pain management  - Notify physician/advanced practitioner if interventions unsuccessful or patient reports new pain  Outcome: Progressing     Problem: NEUROSENSORY - ADULT  Goal: Absence of seizures  Description  INTERVENTIONS  - Monitor for seizure activity  - Administer anti-seizure medications as ordered  - Monitor neurological status  Outcome: Adequate for Discharge  Goal: Remains free of injury related to seizures activity  Description  INTERVENTIONS  - Maintain airway, patient safety  and administer oxygen as ordered  - Monitor patient for seizure activity, document and report duration and description of seizure to physician/advanced practitioner  - If seizure occurs,  ensure patient safety during seizure  - Reorient patient post seizure  - Seizure pads on all 4 side rails  - Instruct patient/family to notify RN of any seizure activity including if an aura is experienced  - Instruct patient/family to call for assistance with activity based on nursing assessment  - Administer anti-seizure medications as ordered  - Monitor fetal well being  Outcome: Adequate for Discharge     Problem: Potential for Aspiration  Goal: Ventilated patient's risk of aspiration is minimized  Description  Assess and monitor vital signs, respiratory status, airway cuff pressure, and labs (WBC)  Monitor for signs of aspiration (tachypnea, cough, rales, wheezing, cyanosis, fever)  - Elevate head of bed 30 degrees if patient has tube feeding   - Monitor tube feeding    Outcome: Adequate for Discharge

## 2019-02-14 NOTE — UTILIZATION REVIEW
Initial Clinical Review    Admission: Date/Time/Statement: observation 02/13/19 2017  Orders Placed This Encounter   Procedures    Place in Observation (expected length of stay for this patient is less than two midnights)     Standing Status:   Standing     Number of Occurrences:   1     Order Specific Question:   Admitting Physician     Answer:   Lynnell Hodgkin [[id number]]     Order Specific Question:   Level of Care     Answer:   Med Surg [16]     ED: Date/Time/Mode of Arrival:   ED Arrival Information     Expected Arrival Acuity Means of Arrival Escorted By Service Admission Type    - 2/13/2019 16:42 Emergent Wheelchair Self Hospitalist Emergency    Arrival Complaint    Visual Disturbance ( both eyes)         Chief Complaint:   Chief Complaint   Patient presents with    Headache - New Onset or New Symptoms     Seen at [facility] on Monday, Dx with Acute Headache, Today woke up with peripheral vision loss  Note weakness, per patient has has a hx of this  Had Cerival fusion in January  Has had dizziness prior to head pain     History of Illness:  59 y o  female who presents with right sided temporal headache and right eye visual loss  Starting Monday morning the patient reported that she noticed right sided temporal pain  She reports that she has had problems with this eye since birth, however reports that she has had new visual deficit in the right eye which started on Monday  The deficit is described as temporal vision loss  She saw her ophthalmologist as well as her primary care physician who sent her to the emergency department for evaluation  She was seen in the [facility] Emergency room on 2/11/19 and diagnosed with a headache and sent home  She re-presented today because the visual deficit has not resolved itself  The patient reports that she has had weakness in her upper and lower bilateral extremities which started approximately 2 weeks ago        ED Vital Signs:   ED Triage Vitals [02/13/19 1650] Temperature Pulse Respirations Blood Pressure SpO2   (!) 97 4 °F (36 3 °C) 102 20 167/92 96 %      Temp Source Heart Rate Source Patient Position - Orthostatic VS BP Location FiO2 (%)   Oral Monitor Sitting Right arm --      Pain Score       No Pain        Wt Readings from Last 1 Encounters:   02/13/19 74 6 kg (164 lb 7 4 oz)     Vital Signs (abnormal): 2/13/2019 97 4; 2/14 temp 97 4    Pertinent Labs/Diagnostic Test Results: 2/13/2019 MRI BRAIN=Few white matter lesions in the lachelle may represent microangiopathic change  2/14/2019 glucose 106, cholesterol 222, triglycerides 282, HDL 34, ,     ED Treatment:   Medication Administration from 02/13/2019 1642 to 02/13/2019 2048       Date/Time Order Dose Route Action Action by Comments     02/13/2019 1901 diphenhydrAMINE (BENADRYL) injection 25 mg 25 mg Intravenous Given Katherine Gonzalez RN      02/13/2019 1906 metoclopramide (REGLAN) injection 10 mg 10 mg Intravenous Given Katherine Gonzalez RN      02/13/2019 1903 diazepam (VALIUM) injection 2 5 mg 2 5 mg Intravenous Given Katherine Gonzalez RN      02/13/2019 2015 aspirin tablet 325 mg 325 mg Oral Given Roderick Avila RN         Past Medical/Surgical History:    Active Ambulatory Problems     Diagnosis Date Noted    [facility] dependence in remission ([facility] ) 04/19/2016    Allergic rhinitis 08/04/2016    Arthropathy of multiple sites 09/04/2012    Depression 09/04/2012    Esophagitis 09/04/2012    Irritable bowel syndrome 09/04/2012    Raynaud's syndrome without gangrene 09/04/2012    Osteopenia 03/27/2017    Pain in both feet 02/15/2018    Lesion of left plantar nerve 02/15/2018    Lesion of right plantar nerve 02/15/2018    Congenital pes planus 02/15/2018    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/15/2018    Right foot pain 02/28/2018    Radiculopathy of lumbar region 02/28/2018    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[address] ) 02/28/2018    Focal neurological deficit 03/03/2018    GERD (gastroesophageal reflux disease) 06/04/2018       Past Medical History:   Diagnosis Date    Anesthesia complication     GERD (gastroesophageal reflux disease)     Lazy eye     Osteopenia     Tinnitus     Wears glasses      Admitting Diagnosis: Right sided temporal headache [R51]  Headache [R51]  Peripheral visual field defect, right [H53 [id number]]     Age/Sex: 59 y o  female     Assessment/Plan: 2/13/2019 attending note:  Focal neurological deficit  Assessment & Plan  · Starting Monday morning the patient reported that she noticed right sided eye and head pain  She reports that she has had problems with this eye since birth, however reports that she has had new visual deficit in the right eye which started on Monday  The deficit is described as temporal vision loss  She saw her ophthalmologist as well as her primary care physician is central the emergency department for evaluation  She was seen in the [facility] Emergency room and diagnosed with a headache and sent home  She re-presented today because the visual deficit has not resolved itself  · Doubt acute thromboembolic event as symptoms have been relatively unchanged over the past 2 days and patient has had symptoms like this as well as generalized bilateral upper and lower extremity weakness for period of approximately 2 weeks    · CT head as well as CTA were normal   · Follow up results of stat MRI  · Stroke pathway, neuro checks, PT/OT/speech  · Check lipid panel, A1c  · Consult Neurology  · Follow-up with Ophthalmology as an outpatient  GERD (gastroesophageal reflux disease)  Assessment & Plan  · Currently asymptomatic   · Continue PPI       Admission Orders:  48 hr telemetry  Peripheral IV  Baseline NIH stroke scale  Neuro checks Q1hrx4; Q [id number]; [id number]  Nursing dysphagia assessment prior to diet  OT/PT/speech eval & treat  Inpatient to Neurology, physical rehab      Scheduled Meds:   Current Facility-Administered Medications:  ALPRAZolam 0 5 mg Oral TID PRN   aspirin 81 mg Oral Daily   atorvastatin 40 mg Oral Daily With Dinner   brimonidine 1 drop Right Eye TID   butalbital-acetaminophen-caffeine 1 tablet Oral Q4H PRN   calcium carbonate 2 tablet Oral Daily With Breakfast   cyclobenzaprine 10 mg Oral TID PRN   DULoxetine 60 mg Oral BID   enoxaparin 40 mg Subcutaneous Daily   gabapentin 100 mg Oral TID   melatonin 3 mg Oral HS   midodrine 10 mg Oral TID With Meals   ondansetron 4 mg Intravenous Q8H PRN   oxyCODONE 5 mg Oral Q4H PRN   pantoprazole 40 mg Oral Early Morning   potassium chloride 20 mEq Oral Daily   senna 1 tablet Oral HS PRN     Continuous Infusions:    PRN Meds:     Network Utilization Review Department  Phone: [phone]; Fax [phone]  [email or web address]  ATTENTION: Please call with any questions or concerns to [phone]  and carefully listen to the prompts so that you are directed to the right person  Send all requests for admission clinical reviews, approved or denied determinations and any other requests to fax [phone]   All voicemails are confidential

## 2019-02-14 NOTE — PROGRESS NOTES
Progress Note - Florentin Jolley 1955, 59 y o  female MRN: [id number]    Unit/Bed#: -01 Encounter: [id number]    Primary Care Provider: Kaela Novak MD   Date and time admitted to hospital: 2/13/2019  4:59 PM        * Focal neurological deficit  Assessment & Plan  · Starting Monday morning the patient reported that she noticed right sided temporal pain  She reports that she has had problems with this eye since birth, however reports that she has had new visual deficit in the right eye which started on Monday  The deficit is described as temporal vision loss  She saw her ophthalmologist as well as her primary care physician who sent her to the emergency department for evaluation  She was seen in the [facility] Emergency room on 2/11/19 and diagnosed with a headache and sent home  She re-presented today because the visual deficit has not resolved itself  · CT head as well as CTA were normal   · MRI unremarkable   · Check lipid panel, A1c  · Consult Neurology; suspect migraine variant likely exacerbated by stress, anxiety   · Follow-up with Ophthalmology as an outpatient    Cervical myelopathy with cervical radiculopathy  Assessment & Plan  · Long history of cervical radicular symptoms in known cord compromise for which surgery was done 6 weeks ago      · Was in acute rehab for 3 weeks post surgery and has been receiving home services since that time  · PT/OT have evaluated and feel like pt is unsafe for discharge home at this time         Autonomic orthostatic hypotension  Assessment & Plan  · Cont midodrine at current dose  · Encourage hydration     History of migraine headaches  Assessment & Plan  · H/o migraines, but none in last 6 years  · Will follow-up with neuro as an outpt     Mixed dyslipidemia  Assessment & Plan  · Noted on lipid panel this am  · Will resume atorvastatin         VTE Pharmacologic Prophylaxis:   Pharmacologic: Enoxaparin (Lovenox)  Mechanical VTE Prophylaxis in Place: Yes    Patient Centered Rounds: I have performed bedside rounds with nursing staff today  Discussions with Specialists or Other Care Team Provider: neurology     Education and Discussions with Family / Patient: patient     Time Spent for Care: 30 minutes  More than 50% of total time spent on counseling and coordination of care as described above  Current Length of Stay: 0 day(s)    Current Patient Status: Observation   Certification Statement: will require short-term rehab given PT/OT assessment     Discharge Plan / Estimated Discharge Date: TBD based on clinical course    Code Status: Level 1 - Full Code    Subjective:   Pt's headache is improving, but still present  Main complaint is increased gait instability and weakness  Denies chest pain or SOB  Objective:     Vitals:   Temp (24hrs), Av 5 °F (36 4 °C), Min:97 4 °F (36 3 °C), Max:97 8 °F (36 6 °C)    Temp:  [97 4 °F (36 3 °C)-97 8 °F (36 6 °C)] 97 5 °F (36 4 °C)  HR:  [] 78  Resp:  [16-20] 18  BP: ()/(63-92) 137/84  SpO2:  [93 %-100 %] 93 %  Body mass index is 28 23 kg/m²  Input and Output Summary (last 24 hours): Intake/Output Summary (Last 24 hours) at 2019 1547  Last data filed at 2019 1352  Gross per 24 hour   Intake    Output 400 ml   Net -400 ml       Physical Exam:     Physical Exam   Constitutional: She is oriented to person, place, and time  No distress  HENT:   Head: Normocephalic and atraumatic  In cervical collar    Cardiovascular: Normal rate and regular rhythm  Pulmonary/Chest: Effort normal  No stridor  No respiratory distress  She has no wheezes  Abdominal: Soft  Bowel sounds are normal  She exhibits no distension  There is no tenderness  Neurological: She is alert and oriented to person, place, and time  She displays abnormal reflex  Coordination abnormal    Skin: Skin is warm  She is not diaphoretic  Psychiatric: She has a normal mood and affect   Her behavior is normal  Additional Data:     Labs:    Results from last 7 days   Lab Units 02/14/19  0536 02/13/19  1820   WBC Thousand/uL 4 85 8 94   HEMOGLOBIN g/dL 12 2 12 5   HEMATOCRIT % 37 7 38 7   PLATELETS Thousands/uL 187 217   NEUTROS PCT %  --  69   LYMPHS PCT %  --  21   MONOS PCT %  --  7   EOS PCT %  --  2     Results from last 7 days   Lab Units 02/14/19  0536   POTASSIUM mmol/L 4 0   CHLORIDE mmol/L 107   CO2 mmol/L 27   BUN mg/dL 12   CREATININE mg/dL 0 83   CALCIUM mg/dL 8 8     Results from last 7 days   Lab Units 02/13/19  1820   INR  1 01       * I Have Reviewed All Lab Data Listed Above  * Additional Pertinent Lab Tests Reviewed:  All Labs Within Last 24 Hours Reviewed    Imaging:    Imaging Reports Reviewed Today Include:   Imaging Personally Reviewed by Myself Includes:      Recent Cultures (last 7 days):           Last 24 Hours Medication List:     Current Facility-Administered Medications:  acetaminophen 650 mg Oral Q8H PRN Ta Baumann MD    ALPRAZolam 0 5 mg Oral TID PRN Yuko Formica, PA-C    aspirin 81 mg Oral Daily Sudie Loua, PA-C    atorvastatin 40 mg Oral Daily With [employer], PA-C    brimonidine 1 drop Right Eye TID Ykuo Yi, PA-C    butalbital-acetaminophen-caffeine 1 tablet Oral Q6H PRN Ta Baumann MD    calcium carbonate 2 tablet Oral Daily With Breakfast Sudie Loua, PA-C    cyclobenzaprine 10 mg Oral TID PRN Sudie Formica, PA-C    DULoxetine 60 mg Oral BID Sudie Formica, PA-C    enoxaparin 40 mg Subcutaneous Daily Sudie Formica, PA-C    gabapentin 100 mg Oral TID Sudie Formica, PA-C    magnesium sulfate 1 g Intravenous Daily SHRAVAN Merino Last Rate: 1 g (02/14/19 1140)   melatonin 3 mg Oral HS Sudie Loua, PA-C    midodrine 10 mg Oral TID With Meals Sudie Loua, PA-C    ondansetron 4 mg Intravenous Q8H PRN Sudie Formica, PA-C    oxyCODONE 5 mg Oral Q4H PRN Sudie Formica, PA-C    pantoprazole 40 mg Oral Early Morning Sudie Formica, KALEB    potassium chloride 20 mEq Oral Daily Violetta KALEB Wilson    senna 1 tablet Oral HS PRN Violetta KALEB Wilson         Today, Patient Was Seen By: Laurel Bueno MD    ** Please Note: This note has been constructed using a voice recognition system   ** 16

## 2019-02-14 NOTE — ASSESSMENT & PLAN NOTE
· Long history of cervical radicular symptoms in known cord compromise for which surgery was done 6 weeks ago  · Was in acute rehab for 3 weeks post surgery and has been receiving home services since that time  · PT/OT have evaluated and feel like pt is unsafe for discharge home at this time   · Pt was contacted by her surgeon's office who said her collar could come off, but she is going to clarify with her surgeon if her new current symptoms would alter that recommendation

## 2019-02-14 NOTE — DISCHARGE INSTR - AVS FIRST PAGE
Please use non drug-related attempts at relieving her headache should they occur her before you use year Fioricet  It please use only 3 your sats in the course of a week 4 year migraines  Continue to stay well hydrated because of year orthostatic blood pressures  Sleep well and rest which will help your recovery from your surgery as well as from this migraine variant headache  If your headache or migraine does not resolve in 3 days or so, or accelerate it is and worsens or if you have other neurologic symptoms please return to the emergency department  Please make an appointment as noted above

## 2019-02-14 NOTE — ASSESSMENT & PLAN NOTE
The patient also reports a recent diagnosis of which she reports to be a longstanding problem of dizziness when she stands  She is currently on midodrine, but remains somewhat symptomatic and fact had a blood pressure of 86 with me on my orthostatics that I had checked on her today  The etiology of her orthostasis is unknown

## 2019-02-14 NOTE — SOCIAL WORK
CM spoke to Pavan Cruz in Admissions at [facility], Pt is accepted for STR  Paavn Cruz will submit for insurance authorization  CM dept will follow

## 2019-02-14 NOTE — ASSESSMENT & PLAN NOTE
· Starting Monday morning the patient reported that she noticed right sided temporal pain  She reports that she has had problems with this eye since birth, however reports that she has had new visual deficit in the right eye which started on Monday  The deficit is described as temporal vision loss  She saw her ophthalmologist as well as her primary care physician who sent her to the emergency department for evaluation  She was seen in the [facility] Emergency room on 2/11/19 and diagnosed with a headache and sent home  She re-presented today because the visual deficit has not resolved itself  · Doubt acute thromboembolic event as symptoms have been relatively unchanged over the past 2 days and patient has had symptoms like this as well as generalized bilateral upper and lower extremity weakness for period of approximately 2 weeks    · CT head as well as CTA were normal   · Follow up results of stat MRI  · Stroke pathway, neuro checks, PT/OT/speech  · Check lipid panel, A1c  · Consult Neurology  · Follow-up with Ophthalmology as an outpatient

## 2019-02-14 NOTE — SPEECH THERAPY NOTE
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    Right foot pain    Radiculopathy of lumbar region    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[address] )    Focal neurological deficit    GERD (gastroesophageal reflux disease)    Cervical myelopathy with cervical radiculopathy    History of recent intraspinal surgery     Past Medical History  Past Medical History:   Diagnosis Date    Anesthesia complication     difficult to wake up    GERD (gastroesophageal reflux disease)     Lazy eye     resolved: 3/27/17    Osteopenia     with joint pain-elevated DEV    Tinnitus     Wears glasses     for reading     Past Surgical History  Past Surgical History:   Procedure Laterality Date    ABDOMINAL SURGERY      lysis of adhesions x 2    APPENDECTOMY      CHOLECYSTECTOMY      open    DILATION AND CURETTAGE OF UTERUS      NEUROMA EXCISION Right 5/26/2017    Procedure: EXCISION MASS / FIBROMA FOOT;  Surgeon: Hui Vega DPM;  Location: [facility];  Service:     RIGHT OOPHORECTOMY      WISDOM TOOTH EXTRACTION      x4     Peter Stokes is a 59 y o  female who presents with right sided temporal headache and right eye visual loss  Starting Monday morning the patient reported that she noticed right sided temporal pain  She reports that she has had problems with this eye since birth, however reports that she has had new visual deficit in the right eye which started on Monday  The deficit is described as temporal vision loss    She saw her ophthalmologist as well as her primary care physician who sent her to the emergency department for evaluation  She was seen in the [facility] Emergency room on 2/11/19 and diagnosed with a headache and sent home  She re-presented today because the visual deficit has not resolved itself  The patient reports that she has had weakness in her upper and lower bilateral extremities which started approximately 2 weeks ago  No reported difficulty with speech, facial droop  He has had increasing difficulty with ambulation is had to ambulate with a cane  Approximately 6 weeks ago the patient had cervical fusion surgery of the spine  She is becoming frustrated that she has not had any answers at this point  Consult received for speech/swallow eval on stroke pathway  Pt passed nsg swallow screen; tolerating regular diet w/o s/s dysphagia or aspiration  No speech/language deficits reported  NIH score is 1 for visual deficits  MRI: 2/13  No evidence of acute infarct, hemorrhage or mass  No need for formal speech/swallow eval at this time  Reconsult if needed    Virgilio Clements, SLP

## 2019-02-14 NOTE — PLAN OF CARE
Problem: PHYSICAL THERAPY ADULT  Goal: Performs mobility at highest level of function for planned discharge setting  See evaluation for individualized goals  Description  Treatment/Interventions: Functional transfer training, LE strengthening/ROM, Therapeutic exercise, Endurance training, Cognitive reorientation, Patient/family training, Equipment eval/education, Bed mobility, Gait training, Spoke to MD, Spoke to nursing, Spoke to case management, OT(PT to see when stair training is appropriate)  Equipment Recommended: [employer] Coins       See flowsheet documentation for full assessment, interventions and recommendations  Outcome: Progressing  Note:   Prognosis: Good  Problem List: Decreased range of motion, Decreased strength, Decreased endurance, Impaired balance, Decreased coordination, Decreased mobility, Decreased cognition, Impaired judgement, Decreased safety awareness, Impaired vision, Impaired sensation  Assessment: Juan Luis Simon is a 59 y o  female who presents with right sided temporal headache and right eye visual loss  Starting Monday morning the patient reported that she noticed right sided temporal pain  She reports that she has had problems with this eye since birth, however reports that she has had new visual deficit in the right eye which started on Monday  The deficit is described as temporal vision loss  She saw her ophthalmologist as well as her primary care physician who sent her to the emergency department for evaluation  She was seen in the [facility] Emergency room on 2/11/19 and diagnosed with a headache and sent home  She re-presented today because the visual deficit has not resolved itself  The patient reports that she has had weakness in her upper and lower bilateral extremities which started approximately 2 weeks ago  Dx: Focal Neurological Deficit, order placed for PT eval and tx, w/ activity order of up and OOB as tolerated   pt presents w/ comorbidities of Cervical myleopathy, Hx of Spinal Surgery, autonomic orthostatic hypotension, Hx of Migranes, Mixed dyslipidemia, Ozuna's neuroma, Depression, Raynaud's, Osteopenia, Radiculopathy of Lumbar region, Lesion of L plantar nerve, Lesion of R plantar nerve, Lyme's arthritis and personal factors of inaccessible home environment, mobilizing w/ assistive device, stair(s) to enter home, inability to navigate community distances, inability to navigate level surfaces w/o external assistance, unable to perform dynamic tasks in community, limited home support, depression, inability to perform current job functions, unable to perform physical activity, inability to perform IADLs, inability to perform ADLs and inability to live alone  pt presents w/ weakness, decreased ROM, decreased endurance, impaired balance, gait deviations, altered sensation, visual impairment, impaired coordination, decreased safety awareness, impaired judgment and fall risk  these impairments are evident in findings from physical examination (weakness, decreased ROM, altered sensation, impaired coordination and impaired vestibulo-occular function), mobility assessment (need for Asif assist w/ all phases of mobility when usually mobilizing independently, tolerance to only 60 feet of ambulation and need for cueing for mobility technique), and Barthel Index: 55/100  pt needed input for task focus and mobility technique  pt is at risk for falls due to physical and safety awareness deficits  pt's clinical presentation is unstable/unpredi  Barriers to Discharge: Decreased caregiver support, Inaccessible home environment     Recommendation: Short-term skilled PT     PT - OK to Discharge: Yes    See flowsheet documentation for full assessment

## 2019-02-14 NOTE — H&P
H&P- Jacki Gatlinburg 1955, 59 y o  female MRN: [id number]  Unit/Bed#: -01 Encounter: [id number]  Primary Care Provider: Loree Espinal MD   Date and time admitted to hospital: 2/13/2019  4:59 PM    * Focal neurological deficit  Assessment & Plan  · Starting Monday morning the patient reported that she noticed right sided eye and head pain  She reports that she has had problems with this eye since birth, however reports that she has had new visual deficit in the right eye which started on Monday  The deficit is described as temporal vision loss  She saw her ophthalmologist as well as her primary care physician is central the emergency department for evaluation  She was seen in the [facility] Emergency room and diagnosed with a headache and sent home  She re-presented today because the visual deficit has not resolved itself  · Doubt acute thromboembolic event as symptoms have been relatively unchanged over the past 2 days and patient has had symptoms like this as well as generalized bilateral upper and lower extremity weakness for period of approximately 2 weeks  · CT head as well as CTA were normal   · Follow up results of stat MRI  · Stroke pathway, neuro checks, PT/OT/speech  · Check lipid panel, A1c  · Consult Neurology  · Follow-up with Ophthalmology as an outpatient    GERD (gastroesophageal reflux disease)  Assessment & Plan  · Currently asymptomatic   · Continue PPI    VTE Prophylaxis: Enoxaparin (Lovenox)  / sequential compression device   Code Status: Full  POLST: POLST form is not discussed and not completed at this time  Discussion with family: Patient at bedside     Anticipated Length of Stay:  Patient will be admitted on an Observation basis with an anticipated length of stay of less than 2 midnights  Justification for Hospital Stay: right head pain    Total Time for Visit, including Counseling / Coordination of Care: 1 hour    Greater than 50% of this total time spent on direct patient counseling and coordination of care  Chief Complaint:   Right eye and head pain    History of Present Illness:    Brook Camacho is a 59 y o  female who presents with right sided temporal headache and right eye visual loss  Starting Monday morning the patient reported that she noticed right sided temporal pain  She reports that she has had problems with this eye since birth, however reports that she has had new visual deficit in the right eye which started on Monday  The deficit is described as temporal vision loss  She saw her ophthalmologist as well as her primary care physician who sent her to the emergency department for evaluation  She was seen in the [place] Emergency room on 2/11/19 and diagnosed with a headache and sent home  She re-presented today because the visual deficit has not resolved itself  The patient reports that she has had weakness in her upper and lower bilateral extremities which started approximately 2 weeks ago  No reported difficulty with speech, facial droop  He has had increasing difficulty with ambulation is had to ambulate with a cane  Approximately 6 weeks ago the patient had cervical fusion surgery of the spine  She is becoming frustrated that she has not had any answers at this point  Review of Systems:    Review of Systems   Constitutional: Negative for activity change, appetite change, chills, fatigue and fever  HENT: Negative for congestion, rhinorrhea, sinus pressure and sore throat  Eyes: Positive for visual disturbance  Negative for photophobia and pain  Respiratory: Negative for cough, shortness of breath and wheezing  Cardiovascular: Negative for chest pain, palpitations and leg swelling  Gastrointestinal: Negative for abdominal distention, abdominal pain, constipation, diarrhea, nausea and vomiting  Endocrine: Negative for cold intolerance, heat intolerance, polydipsia and polyuria     Genitourinary: Negative for difficulty urinating, dysuria, flank pain, frequency and hematuria  Musculoskeletal: Positive for neck pain  Negative for arthralgias, back pain and joint swelling  Skin: Negative for color change, pallor and rash  Allergic/Immunologic: Negative  Neurological: Positive for numbness and headaches  Negative for dizziness, tremors, seizures, syncope, facial asymmetry, speech difficulty, weakness and light-headedness  Hematological: Negative  Psychiatric/Behavioral: Negative  Past Medical and Surgical History:     Past Medical History:   Diagnosis Date    Anesthesia complication     difficult to wake up    GERD (gastroesophageal reflux disease)     Lazy eye     resolved: 3/27/17    Osteopenia     with joint pain-elevated DEV    Tinnitus     Wears glasses     for reading       Past Surgical History:   Procedure Laterality Date    ABDOMINAL SURGERY      lysis of adhesions x 2    APPENDECTOMY      CHOLECYSTECTOMY      open    DILATION AND CURETTAGE OF UTERUS      NEUROMA EXCISION Right 5/26/2017    Procedure: EXCISION MASS / FIBROMA FOOT;  Surgeon: Nelida Cespedes DPM;  Location: [address] [place] [address];  Service:     RIGHT OOPHORECTOMY      WISDOM TOOTH EXTRACTION      x4       Meds/Allergies:    Prior to Admission medications    Medication Sig Start Date End Date Taking?  Authorizing Provider   ALPRAZolam Semaj Gan) 0 5 mg tablet Take by mouth 3 (three) times a day as needed for anxiety   Yes Historical Provider, MD   aspirin (ECOTRIN LOW STRENGTH) 81 mg EC tablet Take 1 tablet (81 mg total) by mouth daily 3/7/18  Yes Cas Dang MD   brimonidine (ALPHAGAN P) 0 1 %    Yes Historical Provider, MD   butalbital-acetaminophen-caffeine (FIORICET,ESGIC) -40 mg per tablet Take 1 tablet by mouth every 4 (four) hours as needed for headaches   Yes Historical Provider, MD   calcium carbonate 1250 MG capsule Take 1,250 mg by mouth   Yes Historical Provider, MD   cyclobenzaprine (FLEXERIL) 5 mg tablet Take 10 mg by mouth 3 (three) times a day as needed for muscle spasms   Yes Historical Provider, MD   DULoxetine (CYMBALTA) 60 mg delayed release capsule 60 mg 2 (two) times a day  10/11/18  Yes Historical Provider, MD   gabapentin (NEURONTIN) 100 mg capsule Take 1 capsule (100 mg total) by mouth 3 (three) times a day for 30 days 2/28/18 2/13/19 Yes Tatyana Zarco DPM   melatonin 3 mg Take 3 mg by mouth daily at bedtime   Yes Historical Provider, MD   midodrine (PROAMATINE) 10 MG tablet Take 10 mg by mouth 3 (three) times a day   Yes Historical Provider, MD   omeprazole (PriLOSEC) 40 MG capsule Take 40 mg by mouth daily  3/24/18  Yes Historical Provider, MD   OXYCODONE HCL PO Take 5 mg by mouth as needed   Yes Historical Provider, MD   oxyCODONE-acetaminophen (PERCOCET)  mg per tablet Take 1 tablet by mouth every 4 (four) hours as needed for moderate pain   Yes Historical Provider, MD   POTASSIUM BICARBONATE PO Take 25 mEq by mouth daily   Yes Historical Provider, MD   Sennosides (SENNA LAX PO) Take by mouth   Yes Historical Provider, MD   ALPHAGAN P 0 1 %  9/14/18 2/13/19  Historical Provider, MD   calcium citrate-vitamin D (CITRACAL+D) 315-200 MG-UNIT per tablet Take by mouth daily  2/13/19  Historical Provider, MD   melatonin 3 mg Take 3 mg by mouth daily at bedtime  2/13/19  Historical Provider, MD   oxyCODONE-acetaminophen (PERCOCET)  mg per tablet Take 1 tablet by mouth every 4 (four) hours as needed for moderate pain  2/13/19  Historical Provider, MD   polyethylene glycol (MIRALAX) 17 g packet Take 17 g by mouth daily as needed (constipation) 3/6/18 2/13/19  Bay Mendoza MD     I have reviewed home medications with patient personally  Allergies:    Allergies   Allergen Reactions    Demerol [Meperidine] Lightheadedness     Reaction Date: 11Jan2006;     Sulfa Antibiotics Hives     Reaction Date: 11Jan2006;     Toradol [Ketorolac Tromethamine]        Social History:     Marital Status: /Civil Union   Occupation: substance abuse coordinator  Patient Pre-hospital Living Situation: home  Patient Pre-hospital Level of Mobility: with cane at baseline  Patient Pre-hospital Diet Restrictions: none  Substance Use History:   Social History     Substance and Sexual Activity   Alcohol Use No     Social History     Tobacco Use   Smoking Status Never Smoker   Smokeless Tobacco Never Used     Social History     Substance and Sexual Activity   Drug Use No       Family History:    Family History   Problem Relation Age of Onset    Heart disease Mother     Stroke Mother 58    COPD Mother     Arthritis Mother     Hypertension Father     Kidney disease Brother         kidney transplant       Physical Exam:     Vitals:   Blood Pressure: 151/81 (02/13/19 2130)  Pulse: 74 (02/13/19 2130)  Temperature: 97 8 °F (36 6 °C) (02/13/19 2130)  Temp Source: Oral (02/13/19 2130)  Respirations: 16 (02/13/19 2130)  Height: 5' 4" (162 6 cm) (02/13/19 2130)  Weight - Scale: 74 6 kg (164 lb 7 4 oz) (02/13/19 2130)  SpO2: 94 % (02/13/19 2130)    Physical Exam   Constitutional: She is oriented to person, place, and time  She appears well-developed and well-nourished  No distress  HENT:   Head: Normocephalic and atraumatic  Mouth/Throat: Oropharynx is clear and moist    No tenderness to palpation over right temporal area  Eyes: Pupils are equal, round, and reactive to light  No scleral icterus  Right eye exhibits abnormal extraocular motion (chronic per patient)  Right eye exhibits no nystagmus  Left eye exhibits normal extraocular motion and no nystagmus  Neck: Neck supple  No JVD present  Cardiovascular: Normal rate, regular rhythm and normal heart sounds  No murmur heard  Pulmonary/Chest: Effort normal and breath sounds normal  No respiratory distress  She has no wheezes  She has no rales  Abdominal: Soft  Bowel sounds are normal  She exhibits no distension  There is no tenderness  There is no rebound and no guarding     Musculoskeletal: She exhibits no edema  Neurological: She is alert and oriented to person, place, and time  She displays normal reflexes  A sensory deficit is present  No cranial nerve deficit  She displays no seizure activity  Coordination normal  GCS eye subscore is 4  GCS verbal subscore is 5  GCS motor subscore is 6  Overall decreased sensation to left-sided upper and left-sided lower extremity  4/5 motor strength in left upper and left lower extremity  5/5 motor strength in right upper and right lower extremity  Skin: Skin is warm and dry  She is not diaphoretic  No erythema  Psychiatric: Her behavior is normal  Her mood appears anxious  Tearful   Nursing note and vitals reviewed  Additional Data:     Lab Results: I have personally reviewed pertinent reports  Results from last 7 days   Lab Units 02/13/19  1820   WBC Thousand/uL 8 94   HEMOGLOBIN g/dL 12 5   HEMATOCRIT % 38 7   PLATELETS Thousands/uL 217   NEUTROS PCT % 69   LYMPHS PCT % 21   MONOS PCT % 7   EOS PCT % 2     Results from last 7 days   Lab Units 02/13/19  1820   SODIUM mmol/L 142   POTASSIUM mmol/L 4 8   CHLORIDE mmol/L 105   CO2 mmol/L 29   BUN mg/dL 12   CREATININE mg/dL 0 86   ANION GAP mmol/L 8   CALCIUM mg/dL 9 7   GLUCOSE RANDOM mg/dL 101     Results from last 7 days   Lab Units 02/13/19  1820   INR  1 01                   Imaging: I have personally reviewed pertinent reports  CT head without contrast   Final Result by Braeden Franco MD (02/13 2002)      No acute intracranial abnormality  Workstation performed: [id number]         MRI brain w wo contrast    (Results Pending)       EKG, Pathology, and Other Studies Reviewed on Admission:   · EKG: NSR, rate 78    Allscripts / Epic Records Reviewed: Yes     ** Please Note: This note has been constructed using a voice recognition system   **

## 2019-02-14 NOTE — PLAN OF CARE
Problem: OCCUPATIONAL THERAPY ADULT  Goal: Performs self-care activities at highest level of function for planned discharge setting  See evaluation for individualized goals  Description  Treatment Interventions: ADL retraining, Functional transfer training, UE strengthening/ROM, Endurance training, Neuromuscular reeducation, Fine motor coordination activities, Compensatory technique education, Continued evaluation, Activityengagement  Equipment Recommended: Bedside commode, Tub seat with back, Other (comment)(commode vs raised toilet w/ handles)       See flowsheet documentation for full assessment, interventions and recommendations  Note:   Limitation: Decreased ADL status, Decreased UE strength, Decreased endurance, Decreased high-level ADLs, Decreased self-care trans, Decreased fine motor control     Assessment: Pt is a 64yo female [facility]13/[facility]  Pt presents w/ focal neurological deficits and significant PMH impacting her occupational performance including anterior cervical diskectomy and fusion C3-4, C4-5 on 1/1/2019, cervical myelopathy w/ cervical radiculopathy, visual impairment (lazy R eye- premorbid)  Pt reports living w/ her  in [facility] w/ 2 RODOLFO PTA  Pt reports mod I w/ ADL PTA using cane and  assist w/ IADL, driving  Upon eval, pt required min A x1 to complete sit <> stand and functional mobility using RW w/ assist of 2nd for chair follow  Pt completed LBD w/ min A  Pt alert and oriented, and able to participate in conversation appropriately to provide social history and discuss interests  Pt reports R hand dominance and presents w/ R UE /LE tremor impacting her activity tolerance and I w/ ADL performance   Pt presents w/ decreased standing balance, decreased L UE strength, decreased R UE coordination, decreased vision, decreased standing tolerance, decreased endurance impacting her I and safety w/ bathing, dressing, oral hygiene, functional mobility, functional transfers, activity engagement, clothing mgmt, food prep / clean up  Pt would benefit from OT while in acute care to address deficits  From an OT perspective, pt would benefit from post acute rehab when medically stable for discharge from acute care   Will continue to follow     OT Discharge Recommendation: Other (Comment)(to post acute rehab)  OT - OK to Discharge: (to post acute rehab at this time)

## 2019-02-14 NOTE — SOCIAL WORK
PT/OT evaluated Pt and recommend [facility]  CM and Dr Venkat Mckeon met with pt at bedside  Pt would like to go to [facility] and would like a referral to [facility]  MATT submitted referral, Pt will need auth  CM dept will follow

## 2019-02-14 NOTE — CONSULTS
Neurology Consult- Jessica Fontanez 1955, 59 y o  female   MRN: [id number] nit/Bed#: -01 Encounter: [id number]      Inpatient consult to Neurology  Consult performed by: SHRAVAN Watson  Consult ordered by: Sophie Neal PA-C      Reason for Consult / Principal Problem:  Visual disturbance  Hx and PE limited by:  None  Review of previous medical records was completed  Family, was not present at the bedside for history and examination    * Focal neurological deficit  Assessment & Plan  Starting Monday morning the patient reported that she noticed right sided temporal pain  She reports that she has had problems with this eye since birth, however reports that she has had new visual deficit in the right eye which started on Monday  The deficit is described as temporal vision loss  However at this time she is also noted to have anisocoria which she is unaware of being diagnosed previously, but I do not see evidence of a Rashard's syndrome on exam My exam at this time it her vision loss has improved but not resolved, and her pupils remain unequal but reactive  Her EOMs are intact  Her MRI is reassuring, as is her CTA which is completely clean, of cerebral vascular disease  She does have however dyslipidemia that needs to be addressed  She saw her ophthalmologist and should certainly follow up with them post discharge     I have reassured her that given her history of migraines, and her negative workup, her recent surgery, and also her orthostasis of unknown etiology, may be contributing to her variable waxing and waning symptoms coupled with her anxiety and stress  Arelis Rosas History of migraine headaches  Assessment & Plan  The patient reports a longstanding history of classic migraines  She reports she has not had a migraine and possibly 6 years or so      Autonomic orthostatic hypotension  Assessment & Plan  The patient also reports a recent diagnosis of which she reports to be a longstanding problem of dizziness when she stands  She is currently on midodrine, but remains somewhat symptomatic and fact had a blood pressure of 86 with me on my orthostatics that I had checked on her today  The etiology of her orthostasis is unknown  History of recent intraspinal surgery  Assessment & Plan  The patient underwent an anterior cervical diskectomy and fusion at C3-C4 and C4-C5 with anterior cervical diskectomy foraminotomy at C3-C4 and C4-C5 with inter body fusion at C3-C4 and C4-C5 on January 1, 2019  Surgery had to be moved up after the patient had an event in the shower where she had significant muscle spasm and increased cervical radicular symptoms  She is recovering well after surgery she is now approximate 3 week/1 month post rehab discharge for her surgery  Cervical myelopathy with cervical radiculopathy  Assessment & Plan  Long history of cervical radicular symptoms in known cord compromise for which surgery was Done 6 weeks ago  At surgery cord compression noted with acute deterioration with upper and lower extremity deficits improved after surgery she is doing well post rehab  I suspect though she has residual right-sided symptoms which may be variable depending on her level of hydration and sleep  Dyslipidemia, mixed  Assessment & Plan  She reports that at the time of her stroke workup in March of 2018 she was started on an aspirin and Lipitor at that time  She reports that she continue the aspirin but she subsequently has not continued the Lipitor  Her panel at this time shows inverted ratio  I have discussed with her adopting Lipitor again  HPI: Juan Luis Simon is a right handed  59 y o  female who  has a long history of migraines dating back several decades although she has not had a migraine several years  Patient recently, January 1st underwent an anterior spinal diskectomy for rather marked cord impingement with both myelopathic in radicular symptoms    She underwent 3 weeks of rehabilitation was discharged home and has been home for approximately 3 weeks with her   Few days ago/the 11th she presented to [facility] with complaints of a headache acutely on the right side nearly on the temporal parietal occipital put above the ear with mild radiation down towards face as well as posteriorly and behind the a regular area  She apparently had a examination at [facility] that was negative the patient was sent home  It has persisted so she presented to [facility]  The patient reports that it is a peripheral visual loss on the temporal side on her right eye  She denies any involvement the left  Additionally she also reports a long history of a lazy poor right eye and her left eye is has always compensated  She reports the history of migraines as noted above with rather classic migraine symptoms, severe in nature, but she denied ever having any sort of migraine variant event  Additionally she also reports that during her rehabilitate of stay for her spinal surgery she had several episodes of dizziness the orthostatic blood pressures revealed that she was dropping her blood pressure and she was started on midodrine 10 mg 3 times a day       Additionally she also reports that last year prior to the diagnosis of her cervical stenosis she had been worked up at least twice with imaging for right arm numbness and tingling which now appears to be related to her spinal stenosis as her MRI at that totally both with without gadolinium was negative  ROS: 12 system cued query:  She reports that she still has the sensation of point tenderness or pain which then radiates forward to the face and posteriorly behind her ear starting from the parietal temporal junction  She reports overall is much improved than previously  She denies any light sensitivity  No photosensitivity  She is not nauseous or vomiting    The headache is minimal now compared to an increased severity yesterday  She has no chest pain or pressure no palpitations  She has chronic numbness and tingling a dull feeling in her right upper extremity greater than the right lower extremity  She reports a degree of weakness subjectively  She denies any bladder or bowel complaints  Her  currently has a flow but she reports no viral occurrences independently  The RN reports that although the patient reports that she is weak in the hand she read the RN reports that she has seen her independently moving in using her right upper extremity in a normal fashion  Historical Information     Past Medical History:   Diagnosis Date    Anesthesia complication     difficult to wake up    GERD (gastroesophageal reflux disease)     Lazy eye     resolved: 3/27/17    Osteopenia     with joint pain-elevated DEV    Tinnitus     Wears glasses     for reading     Past Surgical History:   Procedure Laterality Date    ABDOMINAL SURGERY      lysis of adhesions x 2    APPENDECTOMY      CHOLECYSTECTOMY      open    DILATION AND CURETTAGE OF UTERUS      NEUROMA EXCISION Right 5/26/2017    Procedure: EXCISION MASS / FIBROMA FOOT;  Surgeon: Blanca Yan DPM;  Location: [facility];  Service:     RIGHT OOPHORECTOMY      WISDOM TOOTH EXTRACTION      x4       Social History :  She is  lives with her  in their family home  She has step children but no biologic children  She is not a smoker no alcohol of substance no recreational is  Family History: She reports her grandmother had her risk factor headaches/migraines  She reports her sister also has MS diagnosed at a late age  Her father has hypertension her mother apparently has had a small possibly lacunar infarct        Allergies   Allergen Reactions    Demerol [Meperidine] Lightheadedness     Reaction Date: 11Jan2006;     Sulfa Antibiotics Hives     Reaction Date: 11Jan2006;     Toradol [Ketorolac Tromethamine]      Meds:all current active meds have been reviewed and She is compliant with her medications  Scheduled Meds:  Current Facility-Administered Medications:  ALPRAZolam 0 5 mg Oral TID PRN   aspirin 81 mg Oral Daily   atorvastatin 40 mg Oral Daily With Dinner   brimonidine 1 drop Right Eye TID   butalbital-acetaminophen-caffeine 1 tablet Oral Q4H PRN   calcium carbonate 2 tablet Oral Daily With Breakfast   cyclobenzaprine 10 mg Oral TID PRN   DULoxetine 60 mg Oral BID   enoxaparin 40 mg Subcutaneous Daily   gabapentin 100 mg Oral TID   magnesium sulfate 1 g Intravenous Daily   melatonin 3 mg Oral HS   midodrine 10 mg Oral TID With Meals   ondansetron 4 mg Intravenous Q8H PRN   oxyCODONE 5 mg Oral Q4H PRN   pantoprazole 40 mg Oral Early Morning   potassium chloride 20 mEq Oral Daily   senna 1 tablet Oral HS PRN     PRN Meds:   ALPRAZolam    butalbital-acetaminophen-caffeine    cyclobenzaprine    ondansetron    oxyCODONE    senna      Physical Exam:   Objective   Vitals:Blood pressure 149/88, pulse 77, temperature (!) 97 4 °F (36 3 °C), temperature source Oral, resp  rate 18, height 5' 4" (1 626 m), weight 74 6 kg (164 lb 7 4 oz), SpO2 98 %  ,Body mass index is 28 23 kg/m²  I personally checked her blood pressures during examination and noted that she had significant variability ranging from a high of 186/73 standing not sitting as indicated on her notes where she later dropped to 86 also still standing after a few minutes  She was able to recover her blood pressure adequately  She reported that she was somewhat dizzy when she was 86 systolic  She had a nearly normal physical exam with the exception of her anisocoria, as well as a somewhat enhance right upper extremity weakness compared to the left  Patient was examined in bedside chair there is no family present    General: alert, appears stated age and cooperative  Head: Normocephalic, without obvious abnormality, atraumatic  Oral exam: lips, mucosa, and tongue moist;   Neck: no carotid bruit,   Lungs: clear to auscultation ant  bilaterally  Heart: regular rate and rhythm, S1, S2 normal, no murmur appreciated,   Abdomen: soft, +BS    Extremities: atraumatic, no cyanosis or edema    Neurologic:   Mental status: Alert, oriented, thought content appropriate, she reports that she has been a stressed lately with the occurrence of her surgery the conclusion of rehab and discharged home  CN Exam:  Pupils are unequal but reactive to light bilaterally  The right is smaller by approximately 1 under than the left  The staff nurse reports that earlier today her pupils equal eyes  , EOM's I, no nystagmus VF full, on the left she reports a loss more on the right in the upper quadrant  Gaze conjugate No sensory or motor lateralizations including PP on face, Hearing I B, CNIX-XII I B  Motor: full power, age appropriate x left limbs, she independently and spontaneously uses her right upper and lower extremity but with maneuvers she has a nonorganic weakness of her hand  particularly  Sensory:  Grossly intact  X 4 limbs, 4 mod inc lt, temp, vib which persisted at her bilateral hands but it was significantly decreased at her right great toe compared to the left  prop, intact and  PP testing was slightly diminished on the right compared to the left  Cerebellar: no past pointing or drift from modified Romberg position, no ataxia w maneuvers, Fine motor & finger taps, age appropriate, WNL  DTR's:  Slightly asymmetric but not hyper reflexic; Plantars: downgoing B  Gait:  She uses a single-point cane she, she favors the right lower extremity, no LOB w cadance change  Lab Results:   I have personally reviewed pertinent reports    , CBC:   Results from last 7 days   Lab Units 02/14/19  0536 02/13/19  1820 02/11/19  1310   WBC Thousand/uL 4 85 8 94 7 99   RBC Million/uL 4 35 4 47 5 02   HEMOGLOBIN g/dL 12 2 12 5 14 0   HEMATOCRIT % 37 7 38 7 45 3   MCV fL 87 87 90   PLATELETS Thousands/uL 187 217 230   , BMP/CMP:   Results from last 7 days   Lab Units 02/14/19  0536 02/13/19  1820 02/11/19  1310   SODIUM mmol/L 143 142 136   POTASSIUM mmol/L 4 0 4 8 3 8   CHLORIDE mmol/L 107 105 102   CO2 mmol/L 27 29 27   BUN mg/dL 12 12 11   CREATININE mg/dL 0 83 0 86 1 05   CALCIUM mg/dL 8 8 9 7 9 8   EGFR ml/min/1 73sq m 76 73 57   , Vitamin B12:   , HgBA1C:   Results from last 7 days   Lab Units 02/14/19  0536   HEMOGLOBIN A1C % 6 0   , TSH:   , Coagulation:   Results from last 7 days   Lab Units 02/13/19  1820   INR  1 01   , Lipid Profile:   Results from last 7 days   Lab Units 02/14/19  0536   HDL mg/dL 34*   LDL CALC mg/dL 132*   TRIGLYCERIDES mg/dL 282*        Imaging Studies: I have personally reviewed pertinent films in PACS and Reviewed with her at the bedside her CTA as well as her MRI were negative  I shared with her that both studies were relatively clean with very little small vessel disease seen on her flares given her long history of migraines and previous history of smoking  Counseling / Coordination of Care  Total time spent today 60 minutes  Greater than 50% of total time was spent with the patient and / or family counseling and / or coordination of care  A description of the counseling / coordination of care: All of the above was discussed with the patient at the bedside  She had several questions I believe they were all answered to her satisfaction at this time  I discussed with her in detail migraine journal keeping identifying triggers etc also discussed with her her spinal surgery and the impacted may be having currently given her recent rehabilitation etc   We also discussed the affective stress  Dictation voice to text software has been used in the creation of this document  Please consider this in light of any contextual or grammatical errors

## 2019-02-14 NOTE — PLAN OF CARE
Problem: DISCHARGE PLANNING - CARE MANAGEMENT  Goal: Discharge to post-acute care or home with appropriate resources  Description  INTERVENTIONS:  - Conduct assessment to determine patient/family and health care team treatment goals, and need for post-acute services based on payer coverage, community resources, and patient preferences, and barriers to discharge  - Address psychosocial, clinical, and financial barriers to discharge as identified in assessment in conjunction with the patient/family and health care team  - Arrange appropriate level of post-acute services according to patient?s   needs and preference and payer coverage in collaboration with the physician and health care team  - Communicate with and update the patient/family, physician, and health care team regarding progress on the discharge plan  - Arrange appropriate transportation to post-acute venues  Outcome: Progressing  Note:   PT/OT evaluated Pt and recommend [facility]  MATT and Dr Manohar Giraldo met with pt at bedside  Pt would like to go to [facility]  MATT submitted referral, Pt will need auth  CM dept will follow

## 2019-02-14 NOTE — ASSESSMENT & PLAN NOTE
She reports that at the time of her stroke workup in March of 2018 she was started on an aspirin and Lipitor at that time  She reports that she continue the aspirin but she subsequently has not continued the Lipitor  Her panel at this time shows inverted ratio  I have discussed with her adopting Lipitor again

## 2019-02-14 NOTE — ASSESSMENT & PLAN NOTE
· Starting Monday morning the patient reported that she noticed right sided temporal pain  She reports that she has had problems with this eye since birth, however reports that she has had new visual deficit in the right eye which started on Monday  The deficit is described as temporal vision loss  She saw her ophthalmologist as well as her primary care physician who sent her to the emergency department for evaluation  She was seen in the [facility] Emergency room on 2/11/19 and diagnosed with a headache and sent home  She re-presented today because the visual deficit has not resolved itself    · CT head as well as CTA were normal   · MRI unremarkable   · Check lipid panel, A1c  · Consult Neurology; suspect migraine variant likely exacerbated by stress, anxiety   · Follow-up with Ophthalmology as an outpatient

## 2019-02-14 NOTE — PHYSICAL THERAPY NOTE
PHYSICAL THERAPY Evaluation NOTE    Patient Name: Renetta Au  YZPED'V Date: 2/14/2019     AGE:   59 y o  Mrn:   [id number]  ADMIT DX:  Right sided temporal headache [R51]  Headache [R51]  Peripheral visual field defect, right [H53 [id number]]    Past Medical History:   Diagnosis Date    Anesthesia complication     difficult to wake up    GERD (gastroesophageal reflux disease)     Lazy eye     resolved: 3/27/17    Osteopenia     with joint pain-elevated DEV    Tinnitus     Wears glasses     for reading     Length Of Stay: 0  PHYSICAL THERAPY EVALUATION :    02/14/19 7731   Note Type   Note type Eval only   Pain Assessment   Pain Assessment No/denies pain   Home Living   Type of Home House; Other (Comment)  (2 RODOLFO 1 RODOLFO)   Home Layout One level; Laundry in basement; Other (Comment)  (2 RODOLFO)   Bathroom Shower/Tub Tub/shower unit   Bathroom Toilet Standard   Bathroom Equipment Shower chair;Grab bars in shower   [address] Walker;Cane;Grab bars; Reacher   Additional Comments Pt reports living in 1 [facility] w/ 2 RODOLFO PTA w/ her  and 2 dogs   Prior Function   Level of [place] Needs assistance with IADLs   Lives With Spouse   Receives Help From Family; Other (Comment)  (Home PT/OT)   ADL Assistance   (mod I using AE to complete dressing, grooming)   IADLs Needs assistance  (- drive since September)   Falls in the last 6 months 0   Vocational Other (Comment)  (pt had to resign / retire as unable to return after 2 weeks)   Comments Pt reports able to complete ADL w/ mod I wearing Aspen collar at home since surgery 1/1/2019  Pt is not able to to drive, and needs assistance w/ IADL  Pt reports was receiving home PT/OT   Restrictions/Precautions   Weight Bearing Precautions Per Order No   Braces or Orthoses Other (Comment)  (aspen cervical collar)   Other Precautions Pain; Fall Risk;Spinal precautions; Visual impairment   General   Additional Pertinent History Mari Rashid is a 59 y o  female who presents with right sided temporal headache and right eye visual loss  Starting Monday morning the patient reported that she noticed right sided temporal pain  She reports that she has had problems with this eye since birth, however reports that she has had new visual deficit in the right eye which started on Monday  The deficit is described as temporal vision loss  She saw her ophthalmologist as well as her primary care physician who sent her to the emergency department for evaluation  She was seen in the [facility] Emergency room on 2/11/19 and diagnosed with a headache and sent home  She re-presented today because the visual deficit has not resolved itself  The patient reports that she has had weakness in her upper and lower bilateral extremities which started approximately 2 weeks ago  Dx: Focal Neurological Deficit, Comorbidities include Cervical myleopathy, Hx of Spinal Surgery, autonomic orthostatic hypotension, Hx of Migranes, Mixed dyslipidemia, Ozuna's neuroma, Depression, Raynaud's, Osteopenia, Radiculopathy of Lumbar region, Lesion of L plantar nerve, Lesion of R plantar nerve, Lyme's arthritis     Family/Caregiver Present No   Cognition   Overall Cognitive Status WFL   Arousal/Participation Cooperative   Attention Within functional limits   Orientation Level Oriented X4   Memory Within functional limits   Following Commands Follows all commands and directions without difficulty   Comments Pt  identified by full name and birthdate   Strength RLE   R Hip Flexion 4/5   R Knee Flexion 4/5   R Knee Extension 4-/5   R Ankle Dorsiflexion 3-/5   R Ankle Plantar Flexion 3-/5   Strength LLE   L Hip Flexion 4/5   L Knee Flexion 4/5   L Knee Extension 4-/5   L Ankle Dorsiflexion 3-/5   L Ankle Plantar Flexion 3-/5   Coordination   Movements are Fluid and Coordinated 0   Coordination and Movement Description significant tremor in RLE noted increasing in rate and duration during contralateral MMTs   Sensation   (Pt  reports visual disturbance, and R arm numbness )   Light Touch   RLE Light Touch Grossly intact   LLE Light Touch Grossly intact   Bed Mobility   Additional Comments unable to assess, Pt  seated OOB in chair prior to PT evaluation   Transfers   Sit to Stand 4  Minimal assistance   Additional items Assist x 1; Armrests; Increased time required   Stand to Sit 4  Minimal assistance   Additional items Assist x 1; Increased time required;Verbal cues;Armrests   Ambulation/Elevation   Gait pattern Improper Weight shift;[place];Decreased foot clearance;Narrow JEAN-CLAUDE; Forward Flexion; Shuffling; Festenating; Inconsistent mitchel; Redundant gait at times; Short stride; Ataxia; Excessively slow; Step to  (crouched posture, exacerbated by fatigue)   Gait Assistance 4  Minimal assist   Additional items Assist x 2  (MinAx1 Ax2 for chair follow)   Assistive Device Rolling walker   Distance 20'x1, 60'x2   Balance   Static Sitting Fair +   Dynamic Sitting Fair   Static Standing Poor +   Dynamic Standing Poor +   Ambulatory Zero  (Ax2)   Endurance Deficit   Endurance Deficit Yes   Endurance Deficit Description postural and gait degradation with fatigue   Activity Tolerance   Activity Tolerance Patient limited by fatigue;Treatment limited secondary to medical complications (Comment)   Medical Staff Made Aware Spoke to Linnette Mancilla, Spoke to Dr Ольга Fernández MD   Nurse Made Aware Spoke to RN   Assessment   Prognosis Good   Problem List Decreased range of motion;Decreased strength;Decreased endurance; Impaired balance;Decreased coordination;Decreased mobility; Decreased cognition; Impaired judgement;Decreased safety awareness; Impaired vision; Impaired sensation   Assessment Jessica Fontanez is a 59 y o  female who presents with right sided temporal headache and right eye visual loss   Starting Monday morning the patient reported that she noticed right sided temporal pain  She reports that she has had problems with this eye since birth, however reports that she has had new visual deficit in the right eye which started on Monday  The deficit is described as temporal vision loss  She saw her ophthalmologist as well as her primary care physician who sent her to the emergency department for evaluation  She was seen in the [facility] Emergency room on 2/11/19 and diagnosed with a headache and sent home  She re-presented today because the visual deficit has not resolved itself  The patient reports that she has had weakness in her upper and lower bilateral extremities which started approximately 2 weeks ago  Dx: Focal Neurological Deficit, order placed for PT eval and tx, w/ activity order of up and OOB as tolerated  pt presents w/ comorbidities of Cervical myleopathy, Hx of Spinal Surgery, autonomic orthostatic hypotension, Hx of Migranes, Mixed dyslipidemia, Ozuna's neuroma, Depression, Raynaud's, Osteopenia, Radiculopathy of Lumbar region, Lesion of L plantar nerve, Lesion of R plantar nerve, Lyme's arthritis and personal factors of inaccessible home environment, mobilizing w/ assistive device, stair(s) to enter home, inability to navigate community distances, inability to navigate level surfaces w/o external assistance, unable to perform dynamic tasks in community, limited home support, depression, inability to perform current job functions, unable to perform physical activity, inability to perform IADLs, inability to perform ADLs and inability to live alone  pt presents w/ weakness, decreased ROM, decreased endurance, impaired balance, gait deviations, altered sensation, visual impairment, impaired coordination, decreased safety awareness, impaired judgment and fall risk   these impairments are evident in findings from physical examination (weakness, decreased ROM, altered sensation, impaired coordination and impaired vestibulo-occular function), mobility assessment (need for Asif assist w/ all phases of mobility when usually mobilizing independently, tolerance to only 60 feet of ambulation and need for cueing for mobility technique), and Barthel Index: 55/100  pt needed input for task focus and mobility technique  pt is at risk for falls due to physical and safety awareness deficits  pt's clinical presentation is unstable/unpredi   Barriers to Discharge Decreased caregiver support; Inaccessible home environment   Goals   Patient Goals to get back to driving, and work   STG Expiration Date 02/24/19   Short Term Goal #1 pt will: Increase bilateral LE strength 1/2 grade to facilitate independent mobility, Perform all bed mobility tasks independently to decrease fall risk factors, Perform all transfers modified independent to improve independence, Ambulate 350 ft  with roller walker w/ supervision w/o LOB, Increase all balance 1/2 grade to decrease risk for falls and Improve Barthel Index score to 80 or greater to facilitate independence PT to see when stair training is appropriate   Treatment Day 0   Plan   Treatment/Interventions Functional transfer training;LE strengthening/ROM; Therapeutic exercise; Endurance training;Cognitive reorientation;Patient/family training;Equipment eval/education; Bed mobility;Gait training;Spoke to MD;Spoke to nursing;Spoke to case management;OT  (PT to see when stair training is appropriate)   PT Frequency 5x/wk   Recommendation   Recommendation Short-term skilled PT   Equipment Recommended Walker   PT - OK to Discharge Yes   Additional Comments to rehab when medically appropriate   Modified Peerless Scale   Modified Peerless Scale 4   Barthel Index   Feeding 10   Bathing 0   Grooming Score 5   Dressing Score 5   Bladder Score 10   Bowels Score 10   Toilet Use Score 5   Transfers (Bed/Chair) Score 10   Mobility (Level Surface) Score 0   Stairs Score 0   Barthel Index Score 55     Skilled PT recommended while in hospital and upon DC to progress pt toward treatment goals       Raj Mcintyre, CHAMP 2/14/2019

## 2019-02-14 NOTE — ASSESSMENT & PLAN NOTE
Starting Monday morning the patient reported that she noticed right sided temporal pain  She reports that she has had problems with this eye since birth, however reports that she has had new visual deficit in the right eye which started on Monday  The deficit is described as temporal vision loss  My exam at this time it her vision loss has improved but not resolved  Her MRI is reassuring, as is her CTA which is completely clean, of cerebral vascular disease  She does have however dyslipidemia that needs to be addressed  She saw her ophthalmologist and should certainly follow up with them post discharge     I have reassured her that given her history of migraines, and her negative workup, her recent surgery, and also her orthostasis of unknown etiology, may be contributing to her variable waxing and waning symptoms coupled with her anxiety and stress  Quincy Aguilar

## 2019-02-15 VITALS
DIASTOLIC BLOOD PRESSURE: 83 MMHG | HEART RATE: 87 BPM | BODY MASS INDEX: 28.08 KG/M2 | RESPIRATION RATE: 18 BRPM | HEIGHT: 64 IN | WEIGHT: 164.46 LBS | TEMPERATURE: 98 F | OXYGEN SATURATION: 98 % | SYSTOLIC BLOOD PRESSURE: 154 MMHG

## 2019-02-15 LAB
CRP SERPL QL: 7.5 MG/L
ERYTHROCYTE [SEDIMENTATION RATE] IN BLOOD: 17 MM/HOUR (ref 0–20)

## 2019-02-15 PROCEDURE — 97116 GAIT TRAINING THERAPY: CPT

## 2019-02-15 PROCEDURE — 97110 THERAPEUTIC EXERCISES: CPT

## 2019-02-15 PROCEDURE — 97530 THERAPEUTIC ACTIVITIES: CPT

## 2019-02-15 PROCEDURE — 99217 PR OBSERVATION CARE DISCHARGE MANAGEMENT: CPT | Performed by: HOSPITALIST

## 2019-02-15 PROCEDURE — 85652 RBC SED RATE AUTOMATED: CPT | Performed by: PSYCHIATRY & NEUROLOGY

## 2019-02-15 PROCEDURE — 86140 C-REACTIVE PROTEIN: CPT | Performed by: PSYCHIATRY & NEUROLOGY

## 2019-02-15 RX ORDER — ACETAMINOPHEN 325 MG/1
650 TABLET ORAL EVERY 8 HOURS PRN
Qty: 30 TABLET | Refills: 0 | Status: SHIPPED | OUTPATIENT
Start: 2019-02-15 | End: 2019-09-20 | Stop reason: HOSPADM

## 2019-02-15 RX ORDER — ATORVASTATIN CALCIUM 40 MG/1
40 TABLET, FILM COATED ORAL
Qty: 30 TABLET | Refills: 0 | Status: SHIPPED | OUTPATIENT
Start: 2019-02-15 | End: 2019-04-11 | Stop reason: ALTCHOICE

## 2019-02-15 RX ORDER — BUTALBITAL, ACETAMINOPHEN AND CAFFEINE 50; 325; 40 MG/1; MG/1; MG/1
1 TABLET ORAL EVERY 12 HOURS PRN
Qty: 30 TABLET | Refills: 0 | Status: SHIPPED | OUTPATIENT
Start: 2019-02-15 | End: 2019-04-11 | Stop reason: ALTCHOICE

## 2019-02-15 RX ORDER — ATORVASTATIN CALCIUM 40 MG/1
40 TABLET, FILM COATED ORAL
Qty: 30 TABLET | Refills: 0 | Status: SHIPPED | OUTPATIENT
Start: 2019-02-15 | End: 2019-02-15

## 2019-02-15 RX ADMIN — MIDODRINE HYDROCHLORIDE 10 MG: 5 TABLET ORAL at 12:47

## 2019-02-15 RX ADMIN — POTASSIUM CHLORIDE 20 MEQ: 1500 TABLET, EXTENDED RELEASE ORAL at 09:17

## 2019-02-15 RX ADMIN — ASPIRIN 81 MG: 81 TABLET, COATED ORAL at 09:17

## 2019-02-15 RX ADMIN — ATORVASTATIN CALCIUM 40 MG: 40 TABLET, FILM COATED ORAL at 17:31

## 2019-02-15 RX ADMIN — DULOXETINE HYDROCHLORIDE 60 MG: 60 CAPSULE, DELAYED RELEASE ORAL at 09:17

## 2019-02-15 RX ADMIN — BRIMONIDINE TARTRATE 1 DROP: 1.5 SOLUTION OPHTHALMIC at 17:37

## 2019-02-15 RX ADMIN — ACETAMINOPHEN 650 MG: 325 TABLET, FILM COATED ORAL at 09:29

## 2019-02-15 RX ADMIN — BRIMONIDINE TARTRATE 1 DROP: 1.5 SOLUTION OPHTHALMIC at 09:18

## 2019-02-15 RX ADMIN — GABAPENTIN 100 MG: 100 CAPSULE ORAL at 09:17

## 2019-02-15 RX ADMIN — PANTOPRAZOLE SODIUM 40 MG: 40 TABLET, DELAYED RELEASE ORAL at 05:14

## 2019-02-15 RX ADMIN — MAGNESIUM SULFATE HEPTAHYDRATE 1 G: 1 INJECTION, SOLUTION INTRAVENOUS at 09:18

## 2019-02-15 RX ADMIN — MIDODRINE HYDROCHLORIDE 10 MG: 5 TABLET ORAL at 09:17

## 2019-02-15 RX ADMIN — DULOXETINE HYDROCHLORIDE 60 MG: 60 CAPSULE, DELAYED RELEASE ORAL at 17:37

## 2019-02-15 RX ADMIN — GABAPENTIN 100 MG: 100 CAPSULE ORAL at 17:36

## 2019-02-15 RX ADMIN — CALCIUM 2 TABLET: 500 TABLET ORAL at 09:17

## 2019-02-15 RX ADMIN — ENOXAPARIN SODIUM 40 MG: 40 INJECTION SUBCUTANEOUS at 09:17

## 2019-02-15 RX ADMIN — MIDODRINE HYDROCHLORIDE 10 MG: 5 TABLET ORAL at 17:36

## 2019-02-15 NOTE — DISCHARGE SUMMARY
Transition of Care Discharge Summary - [facility]    Patient Information: Mc Rios 59 y o  female MRN: [id number]  Unit/Bed#: -01 Encounter: [id number]    Discharging Physician / Practitioner: Sohan Haddad MD  PCP: Kelley Pinedo MD  Admission Date: 2/13/2019  Discharge Date: 02/15/19    Disposition:      Short Term Rehab or SNF at [address] [place] [address] SNF:   · Not Applicable to this Patient - Not Applicable to this Patient    Reason for Admission: headache and eye pain    Discharge Diagnoses:     Principal Problem:    Focal neurological deficit  Active Problems:    Cervical myelopathy with cervical radiculopathy    Autonomic orthostatic hypotension    History of migraine headaches    Mixed dyslipidemia    GERD (gastroesophageal reflux disease)    History of recent intraspinal surgery  Resolved Problems:    * No resolved hospital problems  *      Consultations During Hospital Stay:  · Neurology   · PT/OT     Procedures Performed:     MRI: (2/13/19)  IMPRESSION:   1   No evidence of acute infarct, hemorrhage or mass  2   Few white matter lesions in the lachelle may represent microangiopathic change  CT head: (2/13/19)  IMPRESSION:  No acute intracranial abnormality  Medication Adjustments and Discharge Medications:  · Summary of Medication Adjustments made as a result of this hospitalization:   · Adde fioricet as needed for headache    · Medication Dosing Tapers - Please refer to Discharge Medication List for details on any medication dosing tapers (if applicable to patient)  · Medications being temporarily held (include recommended restart time): none   · Discharge Medication List: See after visit summary for reconciled discharge medications  Wound Care Recommendations:  When applicable, please see wound care section of After Visit Summary      Diet Recommendations at Discharge:  Diet -        Diet Orders   (From admission, onward)            Start Ordered    02/14/19 1015  Dietary nutrition supplements  Once     Comments:  Vanilla flavor only please   Question Answer Comment   Select Supplement: Ensure Compact-Vanilla    Frequency Lunch        02/14/19 1015    02/13/19 2215  Diet Regular; Regular House  Diet effective now     Question Answer Comment   Diet Type Regular    Regular Regular House    RD to adjust diet per protocol? Yes        02/13/19 2217 02/13/19 2114  Room Service  Once     Question:  Type of Service  Answer:  Room Service - Appropriate with Assistance    02/13/19 2113        Fluid Restriction - No Fluid Restriction at Discharge  Instructions for any Catheters / Lines Present at Discharge (including removal date, if applicable):     Significant Findings / Test Results:     · See above     Incidental Findings:   · None      Test Results Pending at Discharge (will require follow up):   · none     Outpatient Tests Requested:  · F/u neurology    Complications:  None     Hospital Course:     Maya Roth is a 59 y o  female patient history of cervical myelopathy status post anterior cervical diskectomy at multiple levels who originally presented to the hospital on 2/13/2019 due to right-sided temporal headache as well as right eye visual loss  Patient stated her symptoms started on Monday morning were generally described as temporal vision loss  Patient's saw an ophthalmologist as an outpatient as well as her primary care physician who sent her to the ED for evaluation  Initially she was diagnosed with a headache and sent home however she re-presented to our emergency department as she was concerned her visual deficit had not resolved  Patient was also reported progressively worsening upper and lower extremity weakness at that time despite outpatient PT and OT  Patient was seen and evaluated by our Neurology service  She underwent neuro imaging without any acute findings    On comprehensive neurological exam patient had no observed visual field deficits and her strength was noted to be slightly decreased (4/5 b/l)  They felt like this was likely a migraine variant and recommended outpatient follow-up with the Neurology Service  Was evaluated by Physical therapy and Occupational therapy who noted her decreased upper extremity strength as well as difficulty ambulating and felt she was unsafe for discharge home  They recommended inpatient rehab services given her level of debility  Condition at Discharge: fair     Discharge Day Visit / Exam:     Subjective:  Feels a little discouraged due to her weakness, but anxious to get started   Vitals: Blood Pressure: 154/83 (02/15/19 1506)  Pulse: 87 (02/15/19 1506)  Temperature: 98 °F (36 7 °C) (02/15/19 1506)  Temp Source: Oral (02/15/19 1506)  Respirations: 18 (02/15/19 1506)  Height: 5' 4" (162 6 cm) (02/14/19 1009)  Weight - Scale: 74 6 kg (164 lb 7 4 oz) (02/13/19 2130)  SpO2: 98 % (02/15/19 1506)  Exam:   Physical Exam  Please see exam from today's progress note     Goals of Care Discussions:  · Code Status at Discharge: Level 1 - Full Code  · Were there any Goals of Care Discussions during Hospitalization?: No  · Results of any General Goals of Care Discussions: n/a   · POLST Completed: No   · If POLST Completed, Summary of POLST Agreement Provided Here: n/a   · OK to Rehospitalize if Needed? Yes    Discharge instructions/Information to patient and family:   See after visit summary section titled Discharge Instructions for information provided to patient and family  Planned Readmission: none      Discharge Statement:  I spent 40 minutes discharging the patient  This time was spent on the day of discharge  I had direct contact with the patient on the day of discharge  Greater than 50% of the total time was spent examining patient, answering all patient questions, arranging and discussing plan of care with patient as well as directly providing post-discharge instructions    Additional time then spent on discharge activities      ** Please Note: This note has been constructed using a voice recognition system **

## 2019-02-15 NOTE — PLAN OF CARE
Problem: Potential for Falls  Goal: Patient will remain free of falls  Description  INTERVENTIONS:  - Assess patient frequently for physical needs  -  Identify cognitive and physical deficits and behaviors that affect risk of falls  -  [place] fall precautions as indicated by assessment   - Educate patient/family on patient safety including physical limitations  - Instruct patient to call for assistance with activity based on assessment  - Modify environment to reduce risk of injury  - Consider OT/PT consult to assist with strengthening/mobility  Outcome: Progressing     Problem: Nutrition/Hydration-ADULT  Goal: Nutrient/Hydration intake appropriate for improving, restoring or maintaining nutritional needs  Description  Monitor and assess patient's nutrition/hydration status for malnutrition (ex- brittle hair, bruises, dry skin, pale skin and conjunctiva, muscle wasting, smooth red tongue, and disorientation)  Collaborate with interdisciplinary team and initiate plan and interventions as ordered  Monitor patient's weight and dietary intake as ordered or per policy  Utilize nutrition screening tool and intervene per policy  Determine patient's food preferences and provide high-protein, high-caloric foods as appropriate       INTERVENTIONS:  - Monitor oral intake, urinary output, labs, and treatment plans  - Assess nutrition and hydration status and recommend course of action  - Evaluate amount of meals eaten  - Assist patient with eating if necessary   - Allow adequate time for meals  - Recommend/ encourage appropriate diets, oral nutritional supplements, and vitamin/mineral supplements  - Order, calculate, and assess calorie counts as needed  - Recommend, monitor, and adjust tube feedings and TPN/PPN based on assessed needs  - Assess need for intravenous fluids  - Provide specific nutrition/hydration education as appropriate  - Include patient/family/caregiver in decisions related to nutrition  Outcome: Progressing     Problem: NEUROSENSORY - ADULT  Goal: Achieves stable or improved neurological status  Description  INTERVENTIONS  - Monitor and report changes in neurological status  - Initiate measures to prevent increased intracranial pressure  - Maintain blood pressure and fluid volume within ordered parameters to optimize cerebral perfusion  - Monitor temperature, glucose, and sodium or any other associated labs   Initiate appropriate interventions as ordered  - Monitor for seizure activity   - Administer anti-seizure medications as ordered  Outcome: Progressing  Goal: Absence of seizures  Description  INTERVENTIONS  - Monitor for seizure activity  - Administer anti-seizure medications as ordered  - Monitor neurological status  Outcome: Progressing  Goal: Remains free of injury related to seizures activity  Description  INTERVENTIONS  - Maintain airway, patient safety  and administer oxygen as ordered  - Monitor patient for seizure activity, document and report duration and description of seizure to physician/advanced practitioner  - If seizure occurs,  ensure patient safety during seizure  - Reorient patient post seizure  - Seizure pads on all 4 side rails  - Instruct patient/family to notify RN of any seizure activity including if an aura is experienced  - Instruct patient/family to call for assistance with activity based on nursing assessment  - Administer anti-seizure medications as ordered  - Monitor fetal well being  Outcome: Progressing  Goal: Achieves maximal functionality and self care  Description  INTERVENTIONS  - Monitor swallowing and airway patency with patient fatigue and changes in neurological status  - Encourage and assist patient to increase activity and self care with guidance from rehab services  - Encourage visually impaired, hearing impaired and aphasic patients to use assistive/communication devices  Outcome: Progressing     Problem: Neurological Deficit  Goal: Neurological status is stable or improving  Description  Interventions:  - Monitor and assess patient's level of consciousness, motor function, sensory function, and level of assistance needed for ADLs  - Monitor and report changes from baseline  Collaborate with interdisciplinary team to initiate plan and implement interventions as ordered  - Provide and maintain a safe environment  - Utilize seizure precautions  - Utilize fall precautions  - Utilize aspiration precautions  - Utilize bleeding precautions  Outcome: Progressing     Problem: Activity Intolerance/Impaired Mobility  Goal: Mobility/activity is maintained at optimum level for patient  Description  Interventions:  - Assess and monitor patient  barriers to mobility and need for assistive/adaptive devices  - Assess patient's emotional response to limitations  - Collaborate with interdisciplinary team and initiate plans and interventions as ordered  - Encourage independent activity per ability   - Maintain proper body alignment  - Perform active/passive rom as tolerated/ordered  - Plan activities to conserve energy   - Turn patient  Outcome: Progressing     Problem: Potential for Aspiration  Goal: Non-ventilated patient's risk of aspiration is minimized  Description  Assess and monitor vital signs, respiratory status, and labs (WBC)  Monitor for signs of aspiration (tachypnea, cough, rales, wheezing, cyanosis, fever)  - Assess and monitor patient's ability to swallow  - Place patient up in chair to eat if possible  - HOB up at 90 degrees to eat if unable to get patient up into chair   - Supervise patient during oral intake  - Instruct patient to take small bites  - Instruct patient to take small single sips when taking liquids    - Follow patient-specific strategies generated by speech pathologist   Outcome: Progressing  Goal: Ventilated patient's risk of aspiration is minimized  Description  Assess and monitor vital signs, respiratory status, airway cuff pressure, and labs (WBC)  Monitor for signs of aspiration (tachypnea, cough, rales, wheezing, cyanosis, fever)  - Elevate head of bed 30 degrees if patient has tube feeding   - Monitor tube feeding  Outcome: Progressing     Problem: Nutrition  Goal: Nutrition/Hydration status is improving  Description  Monitor and assess patient's nutrition/hydration status for malnutrition (ex- brittle hair, bruises, dry skin, pale skin and conjunctiva, muscle wasting, smooth red tongue, and disorientation)  Collaborate with interdisciplinary team and initiate plan and interventions as ordered  Monitor patient's weight and dietary intake as ordered or per policy  Utilize nutrition screening tool and intervene per policy  Determine patient's food preferences and provide high-protein, high-caloric foods as appropriate  - Assist patient with eating   - Allow adequate time for meals   - Encourage patient to take dietary supplement as ordered  - Collaborate with clinical nutritionist   - Include patient/family/caregiver in decisions related to nutrition    Outcome: Progressing     Problem: SAFETY ADULT  Goal: Maintain or return to baseline ADL function  Description  INTERVENTIONS:  -  Assess patient's ability to carry out ADLs; assess patient's baseline for ADL function and identify physical deficits which impact ability to perform ADLs (bathing, care of mouth/teeth, toileting, grooming, dressing, etc )  - Assess/evaluate cause of self-care deficits   - Assess range of motion  - Assess patient's mobility; develop plan if impaired  - Assess patient's need for assistive devices and provide as appropriate  - Encourage maximum independence but intervene and supervise when necessary  ¯ Involve family in performance of ADLs  ¯ Assess for home care needs following discharge   ¯ Request OT consult to assist with ADL evaluation and planning for discharge  ¯ Provide patient education as appropriate  Outcome: Progressing  Goal: Maintain or return mobility status to optimal level  Description  INTERVENTIONS:  - Assess patient's baseline mobility status (ambulation, transfers, stairs, etc )    - Identify cognitive and physical deficits and behaviors that affect mobility  - Identify mobility aids required to assist with transfers and/or ambulation (gait belt, sit-to-stand, lift, walker, cane, etc )  - [place] fall precautions as indicated by assessment  - Record patient progress and toleration of activity level on Mobility SBAR; progress patient to next Phase/Stage  - Instruct patient to call for assistance with activity based on assessment  - Request Rehabilitation consult to assist with strengthening/weightbearing, etc   Outcome: Progressing     Problem: DISCHARGE PLANNING  Goal: Discharge to home or other facility with appropriate resources  Description  INTERVENTIONS:  - Identify barriers to discharge w/patient and caregiver  - Arrange for needed discharge resources and transportation as appropriate  - Identify discharge learning needs (meds, wound care, etc )  - Arrange for interpretive services to assist at discharge as needed  - Refer to Case Management Department for coordinating discharge planning if the patient needs post-hospital services based on physician/advanced practitioner order or complex needs related to functional status, cognitive ability, or social support system  Outcome: Progressing     Problem: Knowledge Deficit  Goal: Patient/family/caregiver demonstrates understanding of disease process, treatment plan, medications, and discharge instructions  Description  Complete learning assessment and assess knowledge base    Interventions:  - Provide teaching at level of understanding  - Provide teaching via preferred learning methods  Outcome: Progressing     Problem: DISCHARGE PLANNING - CARE MANAGEMENT  Goal: Discharge to post-acute care or home with appropriate resources  Description  INTERVENTIONS:  - Conduct assessment to determine patient/family and health care team treatment goals, and need for post-acute services based on payer coverage, community resources, and patient preferences, and barriers to discharge  - Address psychosocial, clinical, and financial barriers to discharge as identified in assessment in conjunction with the patient/family and health care team  - Arrange appropriate level of post-acute services according to patient?s   needs and preference and payer coverage in collaboration with the physician and health care team  - Communicate with and update the patient/family, physician, and health care team regarding progress on the discharge plan  - Arrange appropriate transportation to post-acute venues  Outcome: Progressing     Problem: PAIN - ADULT  Goal: Verbalizes/displays adequate comfort level or baseline comfort level  Description  Interventions:  - Encourage patient to monitor pain and request assistance  - Assess pain using appropriate pain scale  - Administer analgesics based on type and severity of pain and evaluate response  - Implement non-pharmacological measures as appropriate and evaluate response  - Consider cultural and social influences on pain and pain management  - Notify physician/advanced practitioner if interventions unsuccessful or patient reports new pain  Outcome: Progressing     Problem: Prexisting or High Potential for Compromised Skin Integrity  Goal: Skin integrity is maintained or improved  Description  INTERVENTIONS:  - Identify patients at risk for skin breakdown  - Assess and monitor skin integrity  - Assess and monitor nutrition and hydration status  - Monitor labs (i e  albumin)  - Assess for incontinence   - Turn and reposition patient  - Assist with mobility/ambulation  - Relieve pressure over bony prominences  - Avoid friction and shearing  - Provide appropriate hygiene as needed including keeping skin clean and dry  - Evaluate need for skin moisturizer/barrier cream  - Collaborate with interdisciplinary team (i e  Nutrition, Rehabilitation, etc )   - Patient/family teaching  Outcome: Progressing

## 2019-02-15 NOTE — PLAN OF CARE
Problem: DISCHARGE PLANNING - CARE MANAGEMENT  Goal: Discharge to post-acute care or home with appropriate resources  Description  INTERVENTIONS:  - Conduct assessment to determine patient/family and health care team treatment goals, and need for post-acute services based on payer coverage, community resources, and patient preferences, and barriers to discharge  - Address psychosocial, clinical, and financial barriers to discharge as identified in assessment in conjunction with the patient/family and health care team  - Arrange appropriate level of post-acute services according to patient?s   needs and preference and payer coverage in collaboration with the physician and health care team  - Communicate with and update the patient/family, physician, and health care team regarding progress on the discharge plan  - Arrange appropriate transportation to post-acute venues  Outcome: Progressing  Note:   CM spoke to Germaine Deshaun in Admissions at [facility] this morning in regards to auth for STR  Per Pt's insurance they will not approve STR until they get documentation from the hospital that the patient is admitted, CM explained that Pt is not inpatient but OBS  CM then spoke to [facility] in  who states that per Pt's plan insurance doesn't require information/auth for OBS status  Yajaira at [facility] is aware  CM spoke to Pavan Cruz again this afternoon and Geofm Rubinstein has still not been obtained  Pavan Cruz will contact charge nurse if Geofm Rubinstein is obtained after CM hours, charge nurse is aware  Nurse Loree made Pt aware that Geofm Rubinstein has still not be obtained  Per Pavan Cruz, Weekend CM can contact [phone] to follow up on auth

## 2019-02-15 NOTE — PLAN OF CARE
Problem: PHYSICAL THERAPY ADULT  Goal: Performs mobility at highest level of function for planned discharge setting  See evaluation for individualized goals  Description  Treatment/Interventions: Functional transfer training, LE strengthening/ROM, Therapeutic exercise, Endurance training, Cognitive reorientation, Patient/family training, Equipment eval/education, Bed mobility, Gait training, Spoke to MD, Spoke to nursing, Spoke to case management, OT(PT to see when stair training is appropriate)  Equipment Recommended: Audra Wright       See flowsheet documentation for full assessment, interventions and recommendations     Outcome: Progressing

## 2019-02-15 NOTE — SOCIAL WORK
[facility], Sarah Vail has been received, #[id number] NRD   CM made Pt aware, Pt will call her family to transport her  Pt will let nurse Loree know  time  Dr Mcneil Gowers aware  Per Sierra  does not need to know  time

## 2019-02-15 NOTE — ASSESSMENT & PLAN NOTE
· Starting Monday morning the patient reported that she noticed right sided temporal pain  She reports that she has had problems with this eye since birth, however reports that she has had new visual deficit in the right eye which started on Monday  The deficit is described as temporal vision loss  She saw her ophthalmologist as well as her primary care physician who sent her to the emergency department for evaluation  She was seen in the [facility] room on 2/11/19 and diagnosed with a headache and sent home  She re-presented today because the visual deficit has not resolved itself    · CT head as well as CTA were normal   · MRI unremarkable   · Lipid panel--> off baseline; will resume   · Consult Neurology; suspect migraine variant likely exacerbated by stress, anxiety   · Improving, but still requiring intermittent APAP   · Follow-up with Ophthalmology as an outpatient

## 2019-02-15 NOTE — UTILIZATION REVIEW
Continued Stay Review    Date: 2/15/2019    Vital Signs: /68 (BP Location: Left arm)   Pulse 84   Temp 97 8 °F (36 6 °C) (Oral)   Resp 18   Ht 5' 4" (1 626 m)   Wt 74 6 kg (164 lb 7 4 oz)   SpO2 95%   BMI 28 23 kg/m²      Assessment/Plan: Starting Mon the patient  reports right sided temporal pain, she shares this eye has had issues since birth  However, now new visual deficit in right eye which started Mon-(evaluated Wed)  This deficit is described as temporal vision loss  She sought care from Ophthalmologist & PCP  Presets d/t un resolving visual defect  CT head normal, CTA normal, MRI unremarkable  Appreciate Neurology input  Follow-up Ophthalmology as outpatient  Cervical myelopathy with cervical radiculopathy: long Hx of cervical radicular symptoms in known cord compromise -surgery 6 weeks ago  Status post rehab for 3 weeks post surgery and receiving home services  PT/OT evaluation-unsafe for home DC at this time       Medications:   Scheduled Meds:   Current Facility-Administered Medications:  acetaminophen 650 mg Oral Q8H PRN Jeanette Aguila MD    ALPRAZolam 0 5 mg Oral TID PRN Jule Koyanagi, PA-C    aspirin 81 mg Oral Daily Jule Koyanagi, PA-C    atorvastatin 40 mg Oral Daily With Barbara Tracy PA-C    brimonidine 1 drop Right Eye TID Jule Koyanagi, PA-C    butalbital-acetaminophen-caffeine 1 tablet Oral Q6H PRN Jeanette Aguila MD    calcium carbonate 2 tablet Oral Daily With Breakfast Jule Koyanagi, PA-C    cyclobenzaprine 10 mg Oral TID PRN Jule Koyanagi, PA-C    DULoxetine 60 mg Oral BID Jule Koyanagi, PA-C    enoxaparin 40 mg Subcutaneous Daily Jule Koyanagi, PA-C    gabapentin 100 mg Oral TID Jule Koyanagi, PA-C    magnesium sulfate 1 g Intravenous Daily SHRAVAN Merino Last Rate: 1 g (02/14/19 1140)   melatonin 3 mg Oral HS Jule Koyanagi, PA-C    midodrine 10 mg Oral TID With Meals Jule Koyanagi, PA-C    ondansetron 4 mg Intravenous Q8H PRN Jule Koyanagi, PA-C    oxyCODONE 5 mg Oral Q4H PRN Ho Ho Kus Pieter, PA-C    pantoprazole 40 mg Oral Early Morning Rachana Pieter, PA-C    potassium chloride 20 mEq Oral Daily Rachana Pieter, PA-C    senna 1 tablet Oral HS PRN Ho Ho Kus Pieter, PA-C      Continuous Infusions:    PRN Meds:   Pertinent Labs/Diagnostic Results: 2/15 c-reactive protein 7 5    Age/Sex: 59 y o  female     Discharge Plan: tbd      Network Utilization Review Department  Phone: [phone]; Fax [phone]  [email or web address]  ATTENTION: Please call with any questions or concerns to [phone]  and carefully listen to the prompts so that you are directed to the right person  Send all requests for admission clinical reviews, approved or denied determinations and any other requests to fax [phone]   All voicemails are confidential

## 2019-02-15 NOTE — PROGRESS NOTES
Progress Note - Renetta Au 1955, 59 y o  female MRN: [id number]    Unit/Bed#: -01 Encounter: [id number]    Primary Care Provider: Jackie Sharpe MD   Date and time admitted to hospital: 2/13/2019  4:59 PM    * Focal neurological deficit  Assessment & Plan  · Starting Monday morning the patient reported that she noticed right sided temporal pain  She reports that she has had problems with this eye since birth, however reports that she has had new visual deficit in the right eye which started on Monday  The deficit is described as temporal vision loss  She saw her ophthalmologist as well as her primary care physician who sent her to the emergency department for evaluation  She was seen in the [facility] Emergency room on 2/11/19 and diagnosed with a headache and sent home  She re-presented today because the visual deficit has not resolved itself  · CT head as well as CTA were normal   · MRI unremarkable   · Lipid panel--> off baseline; will resume   · Consult Neurology; suspect migraine variant likely exacerbated by stress, anxiety   · Improving, but still requiring intermittent APAP   · Follow-up with Ophthalmology as an outpatient    Cervical myelopathy with cervical radiculopathy  Assessment & Plan  · Long history of cervical radicular symptoms in known cord compromise for which surgery was done 6 weeks ago  · Was in acute rehab for 3 weeks post surgery and has been receiving home services since that time  · PT/OT have evaluated and feel like pt is unsafe for discharge home at this time   · Pt reports increased LUE weakness and general unsteadiness on her feet   · Pt was contacted by her surgeon's office who said her collar could come off, but she is going to clarify with her surgeon if her new current symptoms would alter that recommendation           Autonomic orthostatic hypotension  Assessment & Plan  · Cont midodrine at current dose  · Encourage hydration     History of migraine headaches  Assessment & Plan  · H/o migraines, but none in last 6 years  · Will follow-up with neuro as an outpt     Mixed dyslipidemia  Assessment & Plan  · Noted on lipid panel this am  · Will resume atorvastatin       VTE Pharmacologic Prophylaxis:   Pharmacologic: Enoxaparin (Lovenox)  Mechanical VTE Prophylaxis in Place: Yes    Patient Centered Rounds: I have performed bedside rounds with nursing staff today  Discussions with Specialists or Other Care Team Provider:     Education and Discussions with Family / Patient: patient     Time Spent for Care: 30 minutes  More than 50% of total time spent on counseling and coordination of care as described above  Current Length of Stay: 0 day(s)    Current Patient Status: Observation   Certification Statement: Pending insurance approval of short term rehab     Discharge Plan / Estimated Discharge Date: TBD based on clinical course    Code Status: Level 1 - Full Code    Subjective:   Pt is feeling a little better from headache standpoint, but still very discouraged about her weakness  She feels like her left arm is less mobile generally weaker  Having trouble ambulating without a lot of assistance  Objective:     Vitals:   Temp (24hrs), Av 8 °F (36 6 °C), Min:97 5 °F (36 4 °C), Max:98 °F (36 7 °C)    Temp:  [97 5 °F (36 4 °C)-98 °F (36 7 °C)] 98 °F (36 7 °C)  HR:  [73-90] 87  Resp:  [18] 18  BP: (125-154)/(65-86) 154/83  SpO2:  [93 %-98 %] 98 %  Body mass index is 28 23 kg/m²  Input and Output Summary (last 24 hours): Intake/Output Summary (Last 24 hours) at 2/15/2019 1534  Last data filed at 2/15/2019 0220  Gross per 24 hour   Intake    Output 1150 ml   Net -1150 ml       Physical Exam:     Physical Exam   Constitutional: She is oriented to person, place, and time  No distress  Eyes: Pupils are equal, round, and reactive to light  EOM are normal    Cardiovascular: Normal rate and regular rhythm  No murmur heard    Pulmonary/Chest: Effort normal and breath sounds normal  No stridor  No respiratory distress  Abdominal: Soft  Bowel sounds are normal    Neurological: She is alert and oriented to person, place, and time  She displays abnormal reflex  Decreased handgrip and elbow flexion b/l  No sensory deficits    Skin: Skin is warm and dry  She is not diaphoretic  No erythema  Psychiatric: She has a normal mood and affect  Her behavior is normal    Nursing note and vitals reviewed  Additional Data:     Labs:    Results from last 7 days   Lab Units 02/14/19  0536 02/13/19  1820   WBC Thousand/uL 4 85 8 94   HEMOGLOBIN g/dL 12 2 12 5   HEMATOCRIT % 37 7 38 7   PLATELETS Thousands/uL 187 217   NEUTROS PCT %  --  69   LYMPHS PCT %  --  21   MONOS PCT %  --  7   EOS PCT %  --  2     Results from last 7 days   Lab Units 02/14/19  0536   POTASSIUM mmol/L 4 0   CHLORIDE mmol/L 107   CO2 mmol/L 27   BUN mg/dL 12   CREATININE mg/dL 0 83   CALCIUM mg/dL 8 8     Results from last 7 days   Lab Units 02/13/19  1820   INR  1 01       * I Have Reviewed All Lab Data Listed Above  * Additional Pertinent Lab Tests Reviewed:  All Labs Within Last 24 Hours Reviewed    Imaging:    Imaging Reports Reviewed Today Include:   Imaging Personally Reviewed by Myself Includes:      Recent Cultures (last 7 days):           Last 24 Hours Medication List:     Current Facility-Administered Medications:  acetaminophen 650 mg Oral Q8H PRN Levi Gomes MD    ALPRAZolam 0 5 mg Oral TID PRN Felicie KALEB Resendez    aspirin 81 mg Oral Daily Felicie Adas, PA-C    atorvastatin 40 mg Oral Daily With [employer] KALEB Jim    brimonidine 1 drop Right Eye TID Felicie AdaKALEB deng    butalbital-acetaminophen-caffeine 1 tablet Oral Q6H PRN Levi Gomes MD    calcium carbonate 2 tablet Oral Daily With Breakfast Felicie KALEB Resendez    cyclobenzaprine 10 mg Oral TID PRN Felicie Adas, PA-FILIPE    DULoxetine 60 mg Oral BID Felicie AdaEDUARDO deng-FILIPE    enoxaparin 40 mg Subcutaneous Daily Janet Higgins PA-C    gabapentin 100 mg Oral TID Janet Higgins PA-C    magnesium sulfate 1 g Intravenous Daily SHRAVAN Merino Last Rate: 1 g (02/15/19 0918)   melatonin 3 mg Oral HS Janet Higgins PA-C    midodrine 10 mg Oral TID With Meals Janet Higgins PA-C    ondansetron 4 mg Intravenous Q8H PRN Janet Higgins PA-C    oxyCODONE 5 mg Oral Q4H PRN Janet Higgins PA-C    pantoprazole 40 mg Oral Early Morning Janet Higgins PA-C    potassium chloride 20 mEq Oral Daily Janet Higgins PA-C    senna 1 tablet Oral HS PRN Janet Higgins PA-C         Today, Patient Was Seen By: Willy Barker MD    ** Please Note: This note has been constructed using a voice recognition system   **    '

## 2019-02-15 NOTE — ASSESSMENT & PLAN NOTE
· Long history of cervical radicular symptoms in known cord compromise for which surgery was done 6 weeks ago  · Was in acute rehab for 3 weeks post surgery and has been receiving home services since that time  · PT/OT have evaluated and feel like pt is unsafe for discharge home at this time   · Pt reports increased LUE weakness and general unsteadiness on her feet   · Pt was contacted by her surgeon's office who said her collar could come off, but she is going to clarify with her surgeon if her new current symptoms would alter that recommendation

## 2019-02-15 NOTE — SOCIAL WORK
CM spoke to Shelby Ramos in Admissions at [facility] this morning in regards to [address] for STR  Per Pt's insurance they will not approve STR until they get documentation from the hospital that the patient is admitted, CM explained that Pt is not inpatient but OBS  CM then spoke to [facility] in [place] who states that per Pt's plan insurance doesn't require information/auth for OBS status  Yajaira at [facility] is aware  CM spoke to Pavan Cruz again this afternoon and [address] has still not been obtained  Pavan Cruz will contact charge nurse if [address] is obtained after CM hours, charge nurse is aware  Nurse Loree made Pt aware that [address] has still not be obtained  Per Pavan Cruz, Weekend CM can contact [phone] to follow up on auth

## 2019-02-15 NOTE — PHYSICAL THERAPY NOTE
Physical Therapy Progress Note     02/15/19 1500   Pain Assessment   Pain Assessment 0-10   Pain Score No Pain   Restrictions/Precautions   Weight Bearing Precautions Per Order No   Braces or Orthoses Other (Comment)  (aspen cervical collar)   Other Precautions Pain; Fall Risk;Spinal precautions; Visual impairment   General   Chart Reviewed Yes   Response to Previous Treatment Patient with no complaints from previous session  Family/Caregiver Present No   Cognition   Overall Cognitive Status WFL   Arousal/Participation Alert; Responsive; Cooperative   Attention Within functional limits   Orientation Level Oriented X4   Memory Within functional limits   Following Commands Follows all commands and directions without difficulty   Transfers   Sit to Stand 5  Supervision   Additional items Assist x 1; Increased time required;Verbal cues;Armrests   Stand to Sit 5  Supervision   Additional items Armrests; Increased time required;Verbal cues   Ambulation/Elevation   Gait pattern Improper Weight shift;Decreased foot clearance;Shuffling;Excessively slow; Short stride; Step to   Gait Assistance 4  Minimal assist   Additional items Verbal cues; Tactile cues; Assist x 1   Assistive Device Rolling walker   Distance   (5' fwd 5' bkwd)   Endurance Deficit   Endurance Deficit Yes   Activity Tolerance   Activity Tolerance Patient limited by fatigue   Nurse Made Aware RN ok to see   Exercises   Glute Sets Standing;10 reps   Hip Flexion Sitting;15 reps;Bilateral   Hip Abduction Sitting;15 reps;Bilateral   Hip Adduction Sitting;15 reps;Bilateral   Knee AROM Long Arc Quad Sitting;15 reps;Bilateral   Ankle Pumps Sitting;Standing;15 reps;Bilateral   Assessment   Prognosis Good   Problem List Decreased strength;Decreased range of motion;Decreased endurance; Impaired balance;Decreased mobility; Decreased coordination   Assessment Patient seated in recliner upon arrival pt is pleasant and agreeable to tx pt is eager to get stronger   Patient was able to complete sit to stand with supervision and arm rests x8, seated rests required throughout  Pt performed standing heel raises as well as remaining TE seated in recliner  Pt ambualtes 5' forward and 5' backwards with use of RW  Pt reports being very fatigued and feeling very unstable with standing Te but reports her legs feeling more worked with sit to stands however feesl as is she is making progress  Pt will continue to beenfit from skilled PT to increase strength and endurance in order to maximize safe fucntional mobility  Barriers to Discharge Decreased caregiver support; Inaccessible home environment   Goals   Patient Goals to go home   STG Expiration Date 02/24/19   Short Term Goal #1 pt will: Increase bilateral LE strength 1/2 grade to facilitate independent mobility, Perform all bed mobility tasks independently to decrease fall risk factors, Perform all transfers modified independent to improve independence, Ambulate 350 ft  with roller walker w/ supervision w/o LOB, Increase all balance 1/2 grade to decrease risk for falls and Improve Barthel Index score to 80 or greater to facilitate independence PT to see when stair training is appropriate   Plan   Treatment/Interventions Functional transfer training;LE strengthening/ROM; Elevations; Therapeutic exercise; Endurance training;Bed mobility;Gait training   PT Frequency 5x/wk   Recommendation   Recommendation Short-term skilled PT   Equipment Recommended Walker   PT - OK to Discharge Yes  (to STR when medically ready)     Jazmin Benoit, PTA

## 2019-03-06 ENCOUNTER — OFFICE VISIT (OUTPATIENT)
Dept: OBGYN CLINIC | Facility: CLINIC | Age: 64
End: 2019-03-06
Payer: COMMERCIAL

## 2019-03-06 ENCOUNTER — APPOINTMENT (OUTPATIENT)
Dept: RADIOLOGY | Facility: CLINIC | Age: 64
End: 2019-03-06
Payer: COMMERCIAL

## 2019-03-06 VITALS
DIASTOLIC BLOOD PRESSURE: 79 MMHG | BODY MASS INDEX: 27.31 KG/M2 | HEIGHT: 64 IN | HEART RATE: 102 BPM | WEIGHT: 160 LBS | SYSTOLIC BLOOD PRESSURE: 136 MMHG

## 2019-03-06 DIAGNOSIS — M25.552 LEFT HIP PAIN: ICD-10-CM

## 2019-03-06 DIAGNOSIS — M76.32 ILIOTIBIAL BAND SYNDROME, LEFT LEG: ICD-10-CM

## 2019-03-06 DIAGNOSIS — M70.62 GREATER TROCHANTERIC BURSITIS OF LEFT HIP: Primary | ICD-10-CM

## 2019-03-06 DIAGNOSIS — M53.3 SACROILIAC JOINT DYSFUNCTION OF LEFT SIDE: ICD-10-CM

## 2019-03-06 PROCEDURE — 99204 OFFICE O/P NEW MOD 45 MIN: CPT | Performed by: ORTHOPAEDIC SURGERY

## 2019-03-06 PROCEDURE — 73502 X-RAY EXAM HIP UNI 2-3 VIEWS: CPT

## 2019-03-06 NOTE — PROGRESS NOTES
Assessment/Plan:  1  Greater trochanteric bursitis of left hip  diclofenac sodium (VOLTAREN) 1 %    Ambulatory referral to Physical Therapy   2  Iliotibial band syndrome, left leg  Ambulatory referral to Physical Therapy   3  Sacroiliac joint dysfunction of left side     4  Left hip pain  XR hip/pelv 2-3 vws left if performed    diclofenac sodium (VOLTAREN) 1 %     Scribe Attestation    I,:   Trell Trinidad am acting as a scribe while in the presence of the attending physician :        I,:   Lashay Poe DO personally performed the services described in this documentation    as scribed in my presence :              Josh Camp upon examination and review of x-rays today of her left hip demonstrates signs and symptoms consistent with greater trochanteric bursitis, iliotibial band syndrome, and left sacroiliac joint dysfunction  She does demonstrate point tenderness at the left sacral the joint, and is exquisitely painful at the greater trochanteric bursa  She does also demonstrate tenderness along the iliotibial band syndrome to his distal insertion the lateral aspect of the knee  This pain does cause her to have an antalgic gait that is observed as she walked into for her appointment today  As she has not undergone any focused physical therapy for left hip  I would like her to participate in physical therapy 2 to 3 times a week over the next 4-6 weeks  I provided her with a prescription for this today  I also would like to prescribe her a prescription for Voltaren gel be placed at the lateral aspect of her hip up to 4 times a day  I did note to her to test a small area prior to applying a larger amount to question and adverse reaction  Should she have an adverse reaction to the Voltaren gel she is to immediately discontinue using and contact my office  I would like to see Josh Camp back in 3 months for repeat clinical evaluation  Subjective: Left hip pain    Patient ID: Mc Rios is a 59 y o  female  HPI  Kirsten Christianson is a pleasant 60-year-old female who is in today for initial evaluation of her left hip  She states this has been ongoing issue for approximately 1 year  She states that she is experiencing intermittent and moderate to severe sharp pain about the lateral aspect of her hip  This pain will cause her to have an antalgic gait  She states the pain can radiate distally into the lateral aspect of the knee  She denies any trauma that may begin her painful symptoms  She states the pain is exacerbated direct pressure the lateral aspect of her hip as well as elevation changes such as sitting to standing  She describes the pain as an 8-9/10 at its worst   She states that she has been participating in physical therapy for balance however denies any therapy addressing her hip  She states the pain is progressively getting worse as time goes on  Today she denies any distal paresthesias  Review of Systems   Constitutional: Positive for activity change and unexpected weight change  Negative for chills and fever  HENT: Positive for hearing loss  Negative for nosebleeds and sore throat  Eyes: Negative for pain, redness and visual disturbance  Respiratory: Positive for shortness of breath  Negative for cough and wheezing  Cardiovascular: Positive for palpitations  Negative for chest pain and leg swelling  Gastrointestinal: Positive for abdominal pain  Negative for nausea and vomiting  Endocrine: Negative for polydipsia and polyuria  Thirsty   Genitourinary: Negative for dysuria and hematuria  Musculoskeletal: Positive for gait problem  See HPI   Skin: Negative for rash and wound  Neurological: Negative for dizziness, numbness and headaches  Psychiatric/Behavioral: Negative for decreased concentration and suicidal ideas  The patient is not nervous/anxious            Past Medical History:   Diagnosis Date    Anesthesia complication     difficult to wake up    GERD (gastroesophageal reflux disease)     Lazy eye     resolved: 3/27/17    Osteopenia     with joint pain-elevated DEV    Tinnitus     Wears glasses     for reading       Past Surgical History:   Procedure Laterality Date    ABDOMINAL SURGERY      lysis of adhesions x 2    APPENDECTOMY      CHOLECYSTECTOMY      open    DILATION AND CURETTAGE OF UTERUS      NEUROMA EXCISION Right 5/26/2017    Procedure: EXCISION MASS / FIBROMA FOOT;  Surgeon: Amie Mcgee DPM;  [facility];  Service:     RIGHT OOPHORECTOMY      WISDOM TOOTH EXTRACTION      x4       Family History   Problem Relation Age of Onset    Heart disease Mother     Stroke Mother 58    COPD Mother     Arthritis Mother     Hypertension Father     Kidney disease Brother         kidney transplant    No Known Problems Sister     No Known Problems Maternal Aunt     No Known Problems Maternal Uncle     No Known Problems Paternal Aunt     No Known Problems Paternal Uncle     No Known Problems Maternal Grandmother     No Known Problems Maternal Grandfather     No Known Problems Paternal Grandmother     No Known Problems Paternal Grandfather     ADD / ADHD Neg Hx     Anesthesia problems Neg Hx     Cancer Neg Hx     Clotting disorder Neg Hx     Collagen disease Neg Hx     Diabetes Neg Hx     Dislocations Neg Hx     Learning disabilities Neg Hx     Neurological problems Neg Hx     Osteoporosis Neg Hx     Rheumatologic disease Neg Hx     Scoliosis Neg Hx     Vascular Disease Neg Hx        Social History     Occupational History    Not on file   Tobacco Use    Smoking status: Never Smoker    Smokeless tobacco: Never Used   Substance and Sexual Activity    Alcohol use: No    Drug use: No    Sexual activity: Not Currently         Current Outpatient Medications:     acetaminophen (TYLENOL) 325 mg tablet, Take 2 tablets (650 mg total) by mouth every 8 (eight) hours as needed for mild pain or headaches, Disp: 30 tablet, Rfl: 0    aspirin (ECOTRIN LOW STRENGTH) 81 mg EC tablet, Take 1 tablet (81 mg total) by mouth daily, Disp: , Rfl: 0    atorvastatin (LIPITOR) 40 mg tablet, Take 1 tablet (40 mg total) by mouth daily with dinner for 30 days, Disp: 30 tablet, Rfl: 0    brimonidine (ALPHAGAN P) 0 1 %, , Disp: , Rfl:     calcium carbonate 1250 MG capsule, Take 1,250 mg by mouth, Disp: , Rfl:     DULoxetine (CYMBALTA) 60 mg delayed release capsule, 60 mg 2 (two) times a day , Disp: , Rfl: 0    gabapentin (NEURONTIN) 100 mg capsule, Take 1 capsule (100 mg total) by mouth 3 (three) times a day for 30 days, Disp: 90 capsule, Rfl: 0    midodrine (PROAMATINE) 10 MG tablet, Take 10 mg by mouth 3 (three) times a day, Disp: , Rfl:     omeprazole (PriLOSEC) 40 MG capsule, Take 40 mg by mouth daily , Disp: , Rfl: 0    OXYCODONE HCL PO, Take 5 mg by mouth as needed, Disp: , Rfl:     POTASSIUM BICARBONATE PO, Take 25 mEq by mouth daily, Disp: , Rfl:     Sennosides (SENNA LAX PO), Take by mouth, Disp: , Rfl:     ALPRAZolam (XANAX) 0 5 mg tablet, Take by mouth 3 (three) times a day as needed for anxiety, Disp: , Rfl:     butalbital-acetaminophen-caffeine (FIORICET,ESGIC) -40 mg per tablet, Take 1 tablet by mouth every 4 (four) hours as needed for headaches, Disp: , Rfl:     butalbital-acetaminophen-caffeine (FIORICET,ESGIC) -40 mg per tablet, Take 1 tablet by mouth every 12 (twelve) hours as needed for headaches (Patient not taking: Reported on 3/6/2019), Disp: 30 tablet, Rfl: 0    cyclobenzaprine (FLEXERIL) 5 mg tablet, Take 10 mg by mouth 3 (three) times a day as needed for muscle spasms, Disp: , Rfl:     diclofenac sodium (VOLTAREN) 1 %, Apply 2 g topically 4 (four) times a day, Disp: 1 Tube, Rfl: 1    melatonin 3 mg, Take 3 mg by mouth daily at bedtime, Disp: , Rfl:     Allergies   Allergen Reactions    Demerol [Meperidine] Lightheadedness     Reaction Date: 11Jan2006;     Sulfa Antibiotics Hives     Reaction Date: [id number];     Toradol [Ketorolac Tromethamine]        Objective:  Vitals:    03/06/19 1139   BP: 136/79   Pulse: 102       Body mass index is 27 46 kg/m²  Left Hip Exam     Tenderness   The patient is experiencing tenderness in the greater trochanter (Left sacroiliac joint  Iliotibial band)  Range of Motion   Abduction: 50   Adduction: 30   Extension: 0   Flexion: 110   External rotation: 70   Internal rotation: 25     Muscle Strength   Abduction: 5/5   Adduction: 5/5   Flexion: 4/5     Tests   JIMENA: negative    Other   Erythema: absent  Scars: absent  Sensation: normal  Pulse: present    Comments:  Left-sided antalgic gait  Tenderness to palpation over the greater trochanter exacerbated by hip flexion and Adduction  Negative straight leg  Tenderness palpation left SI joint            Physical Exam   Constitutional: She is oriented to person, place, and time  She appears well-developed and well-nourished  HENT:   Head: Normocephalic and atraumatic  Eyes: Conjunctivae are normal  Right eye exhibits no discharge  Left eye exhibits no discharge  Neck: Normal range of motion  Neck supple  Cardiovascular: Normal rate and intact distal pulses  Pulmonary/Chest: Effort normal  No respiratory distress  Musculoskeletal:   As noted in HPI   Neurological: She is alert and oriented to person, place, and time  Skin: Skin is warm and dry  Psychiatric: She has a normal mood and affect  Her behavior is normal  Judgment and thought content normal    Nursing note and vitals reviewed  I have personally reviewed pertinent films in PACS  X-rays of the left hip and pelvis obtained in the office today demonstrate mild to moderate left hip osteoarthritis with severe acetabular osteophyte formation and joint space loss  There is still approximately 50% joint space still remaining

## 2019-03-12 ENCOUNTER — EVALUATION (OUTPATIENT)
Dept: PHYSICAL THERAPY | Facility: CLINIC | Age: 64
End: 2019-03-12
Payer: COMMERCIAL

## 2019-03-12 DIAGNOSIS — M76.32 ILIOTIBIAL BAND SYNDROME, LEFT LEG: ICD-10-CM

## 2019-03-12 DIAGNOSIS — M70.62 GREATER TROCHANTERIC BURSITIS OF LEFT HIP: Primary | ICD-10-CM

## 2019-03-12 PROCEDURE — G8978 MOBILITY CURRENT STATUS: HCPCS

## 2019-03-12 PROCEDURE — 97162 PT EVAL MOD COMPLEX 30 MIN: CPT

## 2019-03-12 PROCEDURE — G8979 MOBILITY GOAL STATUS: HCPCS

## 2019-03-12 NOTE — PROGRESS NOTES
PT Evaluation     Today's date: 3/12/2019  Patient name: Maya Roth  : 1955  MRN: [id number]  Referring provider: Fercho De La Cruz  Dx:   Encounter Diagnosis     ICD-10-CM    1  Greater trochanteric bursitis of left hip M70 62 Ambulatory referral to Physical Therapy   2  Iliotibial band syndrome, left leg M76 32 Ambulatory referral to Physical Therapy                  Assessment  Assessment details: Pt is a pleasant 59 y o  female who presents to [place] [address] PT with L hip pain  Today, she presents with decreased and painful L hip ROM and joint mobility, decreased B LE and core strength, soft tissue density restrictions through L anterior and lateral hip, and high self reports of pain  Functionally, she is limited in her ability to stand and ambulate, negotiate stairs, perform age appropriate recreational activities, perform normal exercise routine, and perform normal household duties  Pt is motivated to improve  Pt will benefit from skilled PT to address the aforementioned deficits and limitations in an effort to maximize pain free functional mobility and overall quality of life  Progress as able with these goals in mind  Impairments: abnormal gait, abnormal muscle firing, abnormal muscle tone, abnormal or restricted ROM, abnormal movement, activity intolerance, impaired physical strength, lacks appropriate home exercise program and pain with function  Understanding of Dx/Px/POC: good   Prognosis: good    Goals  Short term goals (3 weeks):  1) Pt will improve L hip ROM to WNL w/o pain  2) Pt will improve B LE and core strength by 1/3 grade MMT  3) PT will report pain at worse <4/10  4) Pt will initiate and progress HEP w/ special emphasis on functional hip stability an strength,  Long term goals (6 weeks)  1) Pt will improve FOTO to at least 55   2) Pt will be functionally unlimited w/ stairs, min UE support, reciprocal gait pattern, and min pain     3) Pt will perform sit<>stand from level chair height w/ min to no UE support, good hip drive, and no pain  4) Pt will be independent and compliant w/ HEP in order to maximize functional benefit of skilled PT following d/c        Plan  Plan details:     HEP to start: see scanned doc    Patient would benefit from: skilled PT  Planned modality interventions: cryotherapy and thermotherapy: hydrocollator packs  Planned therapy interventions: abdominal trunk stabilization, activity modification, joint mobilization, manual therapy, massage, motor coordination training, neuromuscular re-education, patient education, postural training, stretching, strengthening, therapeutic activities, therapeutic exercise, therapeutic training, transfer training, home exercise program, gait training, graded activity, functional ROM exercises, graded exercise, flexibility, body mechanics training, balance and balance/weight bearing training  Frequency: 2x week  Duration in visits: 12  Duration in weeks: 6  Treatment plan discussed with: patient        Subjective Evaluation    History of Present Illness  Mechanism of injury: Pt has had L hip pain for the last year or so, notes trouble walking over the last 6 months  Has been told she has severe L sided hip and knee OA  Went to see MD last week who did imaging and determined that pain is related to hip bursitis and ITB syndrome  Pt ntoes that sleeping is hard, tries to sleep on L but notes that this is difficult  Pt had TIA last March  Notes some vision changes from this  Recently had fusion of C3-5 for cervical stenosis  Pt notes hx of fibromyalgia and multi joint OA, sees a rheumatologist for this  Would like to learn how to manage current pain and prevent future pain   Functional status to follow:  Quality of life: good    Pain  Current pain ratin  At best pain ratin  At worst pain rating: 10  Location: L side lateral hip, down L ITB to   Quality: burning  Relieving factors: ice, rest, heat and medications  Aggravating factors: standing, walking and stair climbing  Progression: worsening    Social Support  Steps to enter house: yes  Stairs in house: yes   Lives in: multiple-level home (stairs to basement )  Lives with: spouse    Employment status: working (works at [employer] )    Diagnostic Tests  X-ray: abnormal (L hip and knee OA)  Treatments  Previous treatment: physical therapy  Patient Goals  Patient goals for therapy: increased strength, decreased pain and increased motion  Patient goal: learn how to manage pain more effectively, be able to garden w/o pain         Objective     Palpation     Additional Palpation Details  Pt is TTP through B ITB both proximally and distally, as well as corresponding adjacent soft tissue  Mod increase in tissue density through L anterior and lateral hip       Lumbar Screen  Lumbar range of motion within normal limits with the following exceptions:Grossly 75% of normal age limits w/ stiffness at end ranges, greater pain L>R    Neurological Testing     Sensation     Hip   Left Hip   Intact: light touch    Right Hip   Intact: light touch    Active Range of Motion   Left Hip   Flexion: 110 degrees     Right Hip   Normal active range of motion    Additional Active Range of Motion Details  Grossly 75% of normal limits w/o pain at end ranges of rotation and flexion    Passive Range of Motion     Right Hip   Normal passive range of motion    Additional Passive Range of Motion Details  Decreased hip IR/ER by 75% w/ pain at end ranges, others WNL     Strength/Myotome Testing     Left Hip   Planes of Motion   Flexion: 4-  Extension: 4-  Adduction: 4-  External rotation: 4-  Internal rotation: 4-    Right Hip   Planes of Motion   Flexion: 4  Extension: 4  Abduction: 4  External rotation: 4  Internal rotation: 4    Additional Strength Details  Core: no greater than 1+/5     Tests     Additional Tests Details  LAD B improves sx     (+) passive SLR on L at ~60 deg, not positive on R    (+) sacral thrust     TTP through B piri minimally     Ambulation     Ambulation: Level Surfaces     Additional Level Surfaces Ambulation Details  Mod trendelenburg B, min antalgic, decreased stance time on L LE, fatigues quickly    Functional Assessment        Comments  Sit<>stand: requires use of momentum and UE support, fatigues quickly, min antalgic     BW squat: not assessed       Flowsheet Rows      Most Recent Value   PT/OT G-Codes   Current Score  41   Projected Score  52   FOTO information reviewed  Yes   Assessment Type  Evaluation   G code set  Mobility: Walking & Moving Around   Mobility: Walking and Moving Around Current Status ()  CK   Mobility: Walking and Moving Around Goal Status ()  CJ          Precautions: TIA last march, fibromyalgia, fusion of C3-5 due to stenosis     Specialty Daily Treatment Diary     Manual         LAD B        Runners stick to B ITB                                    Exercise Diary         Stationary bike pre        HS, glute, piri stretching        Cross body stretch (gentle)        TrA progressions        Bridging         SLR and hip abd        Hip abd walking        Stair training         Sit<>stand                                                                                                     Modalities        Heat         Ice

## 2019-03-12 NOTE — LETTER
2019    Rajinder Fletcher [facility]  [address] [place] [address] [address] [facility] [place]    Patient: Yumiko Barros   YOB: 1955   Date of Visit: 3/12/2019     Encounter Diagnosis     ICD-10-CM    1  Greater trochanteric bursitis of left hip M70 62 Ambulatory referral to Physical Therapy   2  Iliotibial band syndrome, left leg M76 32 Ambulatory referral to Physical Therapy       Dear Dr Meka Vargas:    Please review the attached Plan of Care from [facility] recent visit  Please verify that you agree therapy should continue by signing the attached document and sending it back to our office  If you have any questions or concerns, please don't hesitate to call  Sincerely,    Stephy To, PT      Referring Provider:      I certify that I have read the below Plan of Care and certify the need for these services furnished under this plan of treatment while under my care  Adrianna Mckeon[address]  [address] [place] [address]  VIA Facsimile: [phone]          PT Evaluation     Today's date: 3/12/2019  Patient name: Yumiko Barros  : 1955  MRN: [id number]  Referring provider: Allison Bruner  Dx:   Encounter Diagnosis     ICD-10-CM    1  Greater trochanteric bursitis of left hip M70 62 Ambulatory referral to Physical Therapy   2  Iliotibial band syndrome, left leg M76 32 Ambulatory referral to Physical Therapy                  Assessment  Assessment details: Pt is a pleasant 59 y o  female who presents to [place] [address] PT with L hip pain  Today, she presents with decreased and painful L hip ROM and joint mobility, decreased B LE and core strength, soft tissue density restrictions through L anterior and lateral hip, and high self reports of pain  Functionally, she is limited in her ability to stand and ambulate, negotiate stairs, perform age appropriate recreational activities, perform normal exercise routine, and perform normal household duties  Pt is motivated to improve   Pt will benefit from skilled PT to address the aforementioned deficits and limitations in an effort to maximize pain free functional mobility and overall quality of life  Progress as able with these goals in mind  Impairments: abnormal gait, abnormal muscle firing, abnormal muscle tone, abnormal or restricted ROM, abnormal movement, activity intolerance, impaired physical strength, lacks appropriate home exercise program and pain with function  Understanding of Dx/Px/POC: good   Prognosis: good    Goals  Short term goals (3 weeks):  1) Pt will improve L hip ROM to WNL w/o pain  2) Pt will improve B LE and core strength by 1/3 grade MMT  3) PT will report pain at worse <4/10  4) Pt will initiate and progress HEP w/ special emphasis on functional hip stability an strength,  Long term goals (6 weeks)  1) Pt will improve FOTO to at least 55   2) Pt will be functionally unlimited w/ stairs, min UE support, reciprocal gait pattern, and min pain  3) Pt will perform sit<>stand from level chair height w/ min to no UE support, good hip drive, and no pain     4) Pt will be independent and compliant w/ HEP in order to maximize functional benefit of skilled PT following d/c        Plan  Plan details:     HEP to start: see scanned doc    Patient would benefit from: skilled PT  Planned modality interventions: cryotherapy and thermotherapy: hydrocollator packs  Planned therapy interventions: abdominal trunk stabilization, activity modification, joint mobilization, manual therapy, massage, motor coordination training, neuromuscular re-education, patient education, postural training, stretching, strengthening, therapeutic activities, therapeutic exercise, therapeutic training, transfer training, home exercise program, gait training, graded activity, functional ROM exercises, graded exercise, flexibility, body mechanics training, balance and balance/weight bearing training  Frequency: 2x week  Duration in visits: 12  Duration in weeks: 6  Treatment plan discussed with: patient        Subjective Evaluation    History of Present Illness  Mechanism of injury: Pt has had L hip pain for the last year or so, notes trouble walking over the last 6 months  Has been told she has severe L sided hip and knee OA  Went to see MD last week who did imaging and determined that pain is related to hip bursitis and ITB syndrome  Pt ntoes that sleeping is hard, tries to sleep on L but notes that this is difficult  Pt had TIA last March  Notes some vision changes from this  Recently had fusion of C3-5 for cervical stenosis  Pt notes hx of fibromyalgia and multi joint OA, sees a rheumatologist for this  Would like to learn how to manage current pain and prevent future pain  Functional status to follow:  Quality of life: good    Pain  Current pain ratin  At best pain ratin  At worst pain rating: 10  Location: L side lateral hip, down L ITB to   Quality: burning  Relieving factors: ice, rest, heat and medications  Aggravating factors: standing, walking and stair climbing  Progression: worsening    Social Support  Steps to enter house: yes  Stairs in house: yes   Lives in: multiple-level home (stairs to basement )  Lives with: spouse    Employment status: working (works at [employer] )    Diagnostic Tests  X-ray: abnormal (L hip and knee OA)  Treatments  Previous treatment: physical therapy  Patient Goals  Patient goals for therapy: increased strength, decreased pain and increased motion  Patient goal: learn how to manage pain more effectively, be able to garden w/o pain         Objective     Palpation     Additional Palpation Details  Pt is TTP through B ITB both proximally and distally, as well as corresponding adjacent soft tissue  Mod increase in tissue density through L anterior and lateral hip       Lumbar Screen  Lumbar range of motion within normal limits with the following exceptions:Grossly 75% of normal age limits w/ stiffness at end ranges, greater pain L>R    Neurological Testing     Sensation     Hip   Left Hip   Intact: light touch    Right Hip   Intact: light touch    Active Range of Motion   Left Hip   Flexion: 110 degrees     Right Hip   Normal active range of motion    Additional Active Range of Motion Details  Grossly 75% of normal limits w/o pain at end ranges of rotation and flexion    Passive Range of Motion     Right Hip   Normal passive range of motion    Additional Passive Range of Motion Details  Decreased hip IR/ER by 75% w/ pain at end ranges, others WNL     Strength/Myotome Testing     Left Hip   Planes of Motion   Flexion: 4-  Extension: 4-  Adduction: 4-  External rotation: 4-  Internal rotation: 4-    Right Hip   Planes of Motion   Flexion: 4  Extension: 4  Abduction: 4  External rotation: 4  Internal rotation: 4    Additional Strength Details  Core: no greater than 1+/5     Tests     Additional Tests Details  LAD B improves sx     (+) passive SLR on L at ~60 deg, not positive on R    (+) sacral thrust     TTP through B piri minimally     Ambulation     Ambulation: Level Surfaces     Additional Level Surfaces Ambulation Details  Mod trendelenburg B, min antalgic, decreased stance time on L LE, fatigues quickly    Functional Assessment        Comments  Sit<>stand: requires use of momentum and UE support, fatigues quickly, min antalgic     BW squat: not assessed       Flowsheet Rows      Most Recent Value   PT/OT G-Codes   Current Score  41   Projected Score  52   FOTO information reviewed  Yes   Assessment Type  Evaluation   G code set  Mobility: Walking & Moving Around   Mobility: Walking and Moving Around Current Status ()  CK   Mobility: Walking and Moving Around Goal Status ()  CJ          Precautions: TIA last march, fibromyalgia, fusion of C3-5 due to stenosis     Specialty Daily Treatment Diary     Manual         LAD B        Runners stick to B ITB                                    Exercise Diary         Stationary bike pre        HS, glute, piri stretching        Cross body stretch (gentle)        TrA progressions        Bridging         SLR and hip abd        Hip abd walking        Stair training         Sit<>stand                                                                                                     Modalities        Heat         Ice

## 2019-03-14 ENCOUNTER — OFFICE VISIT (OUTPATIENT)
Dept: PHYSICAL THERAPY | Facility: CLINIC | Age: 64
End: 2019-03-14
Payer: COMMERCIAL

## 2019-03-14 DIAGNOSIS — M70.62 GREATER TROCHANTERIC BURSITIS OF LEFT HIP: Primary | ICD-10-CM

## 2019-03-14 PROCEDURE — 97110 THERAPEUTIC EXERCISES: CPT

## 2019-03-14 PROCEDURE — 97112 NEUROMUSCULAR REEDUCATION: CPT

## 2019-03-14 NOTE — PROGRESS NOTES
Daily Note     Today's date: 3/14/2019  Patient name: Duane Valencia  : 1955  MRN: [id number]  Referring provider: Calista Nino  Dx:   Encounter Diagnosis     ICD-10-CM    1  Greater trochanteric bursitis of left hip M70 62        Start Time: 0850  Stop Time: 50  Total time in clinic (min): 60 minutes    Subjective: 6/10 pain   Hip; has difficulty squatting, getting up from getting something out of a lower cabinet, going from sit to stand       Objective: See treatment diary below      Precautions: TIA last march, fibromyalgia, fusion of C3-5 due to stenosis     Specialty Daily Treatment Diary     Manual  1 2      LAD B        Runners stick to B ITB                                    Exercise Diary   3-14      Stationary bike pre  X 10 min lvl 2       HS, glute, piri stretching  w SOS HS 3 x 30 sec       Cross body stretch (gentle)  Manual 3 x 30 sec ( gentle) nv       TrA progressions  , march - 2 x 10 ea       Bridging   2 x 10       SLR and hip abd  SLR x 10 &   S/L hip abd       Hip abd walking        Stair training         Sit<>stand           Hip/knee flex w peanut x 20         Bent knee fallout L 1 x 10                                                                                  Modalities        Heat         Ice   X 10 min                       Assessment: Tolerated treatment well  Patient would benefit from continued PT; patient tolerated first full session well; patient required cueing for correct technique with core contraction; demonstrates limited left hip rotation; SLR & hip abd challenging - will benefit from continued strengthening        Plan: Continue per plan of care   Add squats at bar/ total gym squats

## 2019-03-15 ENCOUNTER — OFFICE VISIT (OUTPATIENT)
Dept: NEUROLOGY | Facility: CLINIC | Age: 64
End: 2019-03-15
Payer: COMMERCIAL

## 2019-03-15 VITALS
WEIGHT: 159 LBS | DIASTOLIC BLOOD PRESSURE: 97 MMHG | BODY MASS INDEX: 27.29 KG/M2 | HEART RATE: 99 BPM | SYSTOLIC BLOOD PRESSURE: 144 MMHG

## 2019-03-15 DIAGNOSIS — R51.9 RIGHT SIDED TEMPORAL HEADACHE: Primary | ICD-10-CM

## 2019-03-15 DIAGNOSIS — I10 HYPERTENSION, UNSPECIFIED TYPE: ICD-10-CM

## 2019-03-15 DIAGNOSIS — G93.9 LESION OF PONS: ICD-10-CM

## 2019-03-15 PROCEDURE — 99215 OFFICE O/P EST HI 40 MIN: CPT | Performed by: PHYSICIAN ASSISTANT

## 2019-03-15 RX ORDER — ADHESIVE BANDAGE 3/4"
BANDAGE TOPICAL
Qty: 1 EACH | Refills: 0 | Status: SHIPPED | OUTPATIENT
Start: 2019-03-15 | End: 2019-08-27

## 2019-03-15 NOTE — PROGRESS NOTES
Patient ID: Peter Stokes is a 59 y o  female  Assessment/Plan:     Problem List Items Addressed This Visit        Cardiovascular and Mediastinum    Hypertension    Relevant Medications    Blood Pressure Monitoring (BLOOD PRESSURE CUFF) MISC       Other    Lesion of lachelle    Right sided temporal headache - Primary    Relevant Orders    Sedimentation rate, automated    C-reactive protein    Ambulatory referral to Vascular Surgery           59-year-old right-handed female with a history of chronic migraine, hospital follow-up today to address new right-sided headache concerning for possible temporal arteritis  H/o of recent cervical neurosurgery as noted below and associated chronic UE weakness  H/o acute left-sided weakness s/p IV tPA 3/3/2018-- right lacunar stroke versus conversion disorder- since resolved  Other than chronic white matter lesions in the lachelle, no new findings on neuro imaging  HA1C 6 0%  , continue atorvastatin 40 mg and 81 mg aspirin daily  We will consider repeat echo if needed  I asked her to take her BP at home (when relaxed) and if >130/80 on several consecutive days, she should call us  Re: new right sided headaches:  Low suspicion for GCA with sed rate of 17, CRP 7 5 slightly elevated  I asked her to repeat ESR and CRP, and if elevated would ask her to have a temporal artery biopsy  If normal will consider these headaches a migraine variant  She should see attending neurologist next available  The patient was counseled to call our office immediately, 911 or report to the nearest emergency room with any new or worsening neurological symptoms  Subjective:    HPI    Ms Peter Stokes is an extremely pleasant 59-year-old right-handed female who is here for neurological follow-up after hospital admission      1/1/2019- anterior spinal diskectomy for cord impingement with both myelopathic and radicular symptoms in the left > right arms, as well as gait instability and falls, after which she did rehab  Since this surgery she reports a significant improvement in her balance, strength in the arms and legs and sensation in the arms  -- Dr Fiorella Myers, [facility]     2/11/19- She reports acute, sharp right-sided temporal headache starting behind the ear and radiating into the temporal region with a sensation of throbbing, and redness, also radiating in front of the right ear and into the jaw to a lesser degree of pain  She reported to the ED for this at [place] on 2/11/2019, BP noted to be 176/91, heart rate 91, CTA head and neck unremarkable, given Toradol plus morphine plus IVF plus Omnipaque, and was sent home  2/13/2019- presented to the ED at [facility] for a persisting headache as noted above and in addition her ophthalmologist wanted her to be seen because her visual fields were diminished bitemporally and the patient noted decreased vision from the right eye  Neuro imaging did not reveal acute findings  Ultimately neurology felt that this was likely a migraine variant and recommended outpatient follow-up  See [facility]/ Dr Mando Prado note 2/14/19, inpatient Neurology  Her visual field was improved per Neurology and patient  No papilledema on exam per inpatient  There was anisocoria which was noted to be likely physiologic in nature  Noted to have low suspicion for temporal arteritis given the presentation  She denies stiffness of the jaw, jaw claudication  Today the patient notes some bothersome tinnitus in both ears, whooshing in her entire head all over and some vertigo when she bends over to pick something up  The vertigo resolves within seconds  Ophtho- Dr Augustina Brian  On questioning the patient had a similar headache as noted above when she was in rehab after cervical spine surgery  At that time it was thought to be temporal arteritis, but no workup was done since she improved with medications administered    She does not remember the medication they gave her  -- states she had a fusion C3-4 and C4-5, now she has to get C5-6 done per her history  She has a personal history of migraines  She states that the headache noted above that she went to the hospital for was not like her typical migraine which is:  pain behind one eye, nausea, light and sound sens  This headache was a throbbing pain, very localized, could feel the throbbing and saw redness of the temple, not associated with light or sound sensitivity, nausea  Headache resolved and she was discharged home  She has had no headache since discharge  She is following up to make sure she does not need any further workup or treatment  The following portions of the patient's history were reviewed and updated as appropriate:   She  has a past medical history of Anesthesia complication, GERD (gastroesophageal reflux disease), Lazy eye, Osteopenia, Tinnitus, and Wears glasses    She   Patient Active Problem List    Diagnosis Date Noted    Lesion of lachelle 03/19/2019    Hypertension 03/19/2019    Right sided temporal headache 03/19/2019    Cervical myelopathy with cervical radiculopathy 02/14/2019    History of recent intraspinal surgery 02/14/2019    Autonomic orthostatic hypotension 02/14/2019    History of migraine headaches 02/14/2019    Mixed dyslipidemia 02/14/2019    GERD (gastroesophageal reflux disease) 06/04/2018    Focal neurological deficit 03/03/2018    Right foot pain 02/28/2018    Radiculopathy of lumbar region 02/28/2018    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[facility] [address] ) 02/28/2018    Pain in both feet 02/15/2018    Lesion of left plantar nerve 02/15/2018    Lesion of right plantar nerve 02/15/2018    Congenital pes planus 02/15/2018    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/15/2018    Osteopenia 03/27/2017    Allergic rhinitis 08/04/2016    Alcohol dependence in remission ([facility] [address] ) 04/19/2016    Arthropathy of multiple sites 09/04/2012    Depression 09/04/2012    Esophagitis 09/04/2012    Irritable bowel syndrome 09/04/2012    Raynaud's syndrome without gangrene 09/04/2012     She  has a past surgical history that includes Appendectomy; Cholecystectomy; Right oophorectomy; Abdominal surgery; Dilation and curettage of uterus; [place] tooth extraction; and NEUROMA EXCISION (Right, 5/26/2017)  Her family history includes Arthritis in her mother; COPD in her mother; Heart disease in her mother; Hypertension in her father; Kidney disease in her brother; No Known Problems in her maternal aunt, maternal grandfather, maternal grandmother, maternal uncle, paternal aunt, paternal grandfather, paternal grandmother, paternal uncle, and sister; Stroke (age of onset: 58) in her mother  She  reports that she has never smoked  She has never used smokeless tobacco  She reports that she does not drink alcohol or use drugs    Current Outpatient Medications   Medication Sig Dispense Refill    acetaminophen (TYLENOL) 325 mg tablet Take 2 tablets (650 mg total) by mouth every 8 (eight) hours as needed for mild pain or headaches 30 tablet 0    ALPRAZolam (XANAX) 0 5 mg tablet Take by mouth 3 (three) times a day as needed for anxiety      aspirin (ECOTRIN LOW STRENGTH) 81 mg EC tablet Take 1 tablet (81 mg total) by mouth daily  0    atorvastatin (LIPITOR) 40 mg tablet Take 1 tablet (40 mg total) by mouth daily with dinner for 30 days 30 tablet 0    brimonidine (ALPHAGAN P) 0 1 %       calcium carbonate 1250 MG capsule Take 1,250 mg by mouth      diclofenac sodium (VOLTAREN) 1 % Apply 2 g topically 4 (four) times a day 1 Tube 1    DULoxetine (CYMBALTA) 60 mg delayed release capsule 60 mg once   0    gabapentin (NEURONTIN) 100 mg capsule Take 1 capsule (100 mg total) by mouth 3 (three) times a day for 30 days (Patient taking differently: Take 100 mg by mouth 2 (two) times a day ) 90 capsule 0    melatonin 3 mg Take 3 mg by mouth daily at bedtime      midodrine (PROAMATINE) 10 MG tablet Take 10 mg by mouth 3 (three) times a day      omeprazole (PriLOSEC) 40 MG capsule Take 40 mg by mouth daily   0    Sennosides (SENNA LAX PO) Take by mouth      Blood Pressure Monitoring (BLOOD PRESSURE CUFF) MISC Take BP PRN when relaxed  1 each 0    butalbital-acetaminophen-caffeine (FIORICET,ESGIC) -40 mg per tablet Take 1 tablet by mouth every 4 (four) hours as needed for headaches      butalbital-acetaminophen-caffeine (FIORICET,ESGIC) -40 mg per tablet Take 1 tablet by mouth every 12 (twelve) hours as needed for headaches (Patient not taking: Reported on 3/6/2019) 30 tablet 0    cyclobenzaprine (FLEXERIL) 5 mg tablet Take 10 mg by mouth 3 (three) times a day as needed for muscle spasms      OXYCODONE HCL PO Take 5 mg by mouth as needed      POTASSIUM BICARBONATE PO Take 25 mEq by mouth daily       No current facility-administered medications for this visit  She is allergic to demerol [meperidine]; sulfa antibiotics; and toradol [ketorolac tromethamine]            Objective:    Blood pressure 144/97, pulse 99, weight 72 1 kg (159 lb)  Physical Exam    Neurological Exam  Vital signs reviewed  Well developed, well nourished  Very pleasant mood and affect  No dysarthria  Speech is fluent and articulate  Insight and judgment are adequate  Head: Normocephalic, atraumatic  Neck: Neck flexors 5/5, mild to moderate tenderness to palpation of the cervical paraspinal muscles and vertebrae  CN 2-12: intact and symmetric, including EOMs which are normal b/l and PERRL, except for slightly increased pupil size on the left, but both reactive to light equally  Fundi b/l are normal to crude ophthalmological examination  Visual fields are slightly diminished in all 4 quadrants, moving closer to confrontation her vision peripheral is then more clear  MSK:  Mild weakness R>L UE which is not new, improving since cervical surgery  4/5 right shoulder, 5-/5 L shoulder, finger abd 5-/5 b/l   ROM normal x all 4 extr  No pronator drift  Reflexes:  Brisk in the upper extremities with positive Malik sign left > right, absent Babinski bilaterally  Coordination: Nml x4 extr  Gait: Steady normal gait  ROS:    Review of Systems   Constitutional: Positive for fatigue  HENT: Positive for tinnitus  Eyes: Negative  Respiratory: Negative  Cardiovascular: Negative  Gastrointestinal: Negative  Endocrine: Negative  Genitourinary: Negative  Musculoskeletal: Positive for gait problem (bursitis in L leg/hip)  Skin: Negative  Allergic/Immunologic: Negative  Neurological: Positive for weakness (legs) and headaches (pressure right side of head)  Hematological: Negative  Psychiatric/Behavioral: Positive for sleep disturbance (increased day time sleepiness)  The following portions of the patient's history were reviewed and updated as appropriate: allergies, current medications/ medication history, past family history, past medical history, past social history, past surgical history and problem list     Review of systems was reviewed and otherwise unremarkable from a neurological perspective

## 2019-03-18 ENCOUNTER — OFFICE VISIT (OUTPATIENT)
Dept: PHYSICAL THERAPY | Facility: CLINIC | Age: 64
End: 2019-03-18
Payer: COMMERCIAL

## 2019-03-18 ENCOUNTER — TRANSCRIBE ORDERS (OUTPATIENT)
Dept: PHYSICAL THERAPY | Facility: CLINIC | Age: 64
End: 2019-03-18

## 2019-03-18 DIAGNOSIS — M76.32 ILIOTIBIAL BAND SYNDROME, LEFT LEG: ICD-10-CM

## 2019-03-18 DIAGNOSIS — M70.62 GREATER TROCHANTERIC BURSITIS OF LEFT HIP: Primary | ICD-10-CM

## 2019-03-18 PROCEDURE — 97140 MANUAL THERAPY 1/> REGIONS: CPT

## 2019-03-18 PROCEDURE — 97110 THERAPEUTIC EXERCISES: CPT

## 2019-03-18 NOTE — PROGRESS NOTES
Daily Note     Today's date: 3/18/2019  Patient name: Brandon Matos  : 1955  MRN: [id number]  Referring provider: Roney Maurice  Dx:   Encounter Diagnosis     ICD-10-CM    1  Greater trochanteric bursitis of left hip M70 62        Start Time: 1100  Stop Time: 1200  Total time in clinic (min): 60 minutes    Subjective: 7/10 pain left hip & side of left leg to the shin; did not get any sleep last night in addition to a toothache      Objective: See treatment diary below      Precautions: TIA last march, fibromyalgia, fusion of C3-5 due to stenosis     Specialty Daily Treatment Diary     Manual  1 2 3     LAD B        Runners stick to B ITB                                    Exercise Diary   314 3-18      Stationary bike pre  X 10 min lvl 2  X 10 min - lvl 1      HS, glute, piri stretching  w SOS HS 3 x 30 sec  w SOS HS 3 x 30 sec      Cross body stretch (gentle)  Manual 3 x 30 sec ( gentle) nv  Not today      TrA progressions  , march - 2 x 10 ea  Isos, march 2 x 10 ea      Bridging   2 x 10  2 x 10      SLR and hip abd  SLR x 10 &   S/L hip abd  SLR x 10 w assist  S/L hip abd 2 x 10 - no assist      Hip abd walking        Stair training         Sit<>stand           Hip/knee flex w peanut x 20  Hip/knee flex w peanut x 20        Bent knee fallout L 1 x 10 Bent knee fallout Left 2  x 10                                                                                  Modalities        Heat         Ice   X 10 min  X 10 min                    Assessment: Tolerated treatment well  Patient would benefit from continued PT; added soft tissue mobilization to left lateral hip with patient reporting increased relief post session - left lateral hip tender with point specific palpation; still requires assist w SLR; rotation limited performing bent knee fallout        Plan: Continue per plan of care   Encouraged continued application of ice for left hip bursitis

## 2019-03-19 ENCOUNTER — TELEPHONE (OUTPATIENT)
Dept: VASCULAR SURGERY | Facility: CLINIC | Age: 64
End: 2019-03-19

## 2019-03-19 ENCOUNTER — TELEPHONE (OUTPATIENT)
Dept: NEUROLOGY | Facility: CLINIC | Age: 64
End: 2019-03-19

## 2019-03-19 PROBLEM — I10 HYPERTENSION: Status: ACTIVE | Noted: 2019-03-19

## 2019-03-19 PROBLEM — R51.9 RIGHT SIDED TEMPORAL HEADACHE: Status: ACTIVE | Noted: 2019-03-19

## 2019-03-19 PROBLEM — G93.9 LESION OF PONS: Status: ACTIVE | Noted: 2019-03-19

## 2019-03-19 NOTE — TELEPHONE ENCOUNTER
Rec request from [address] [place] [address] Neurology, Angélica Joshua PA-C for duplex and poss biopsy  Called back and spoke with [facility] for script for duplex  Patient is scheduled for 3/20/19 1:30pm at the [address] [place] [address] [facility] [address] [facility] for her temporal artery duplex  I called the office and requested they put a script in for the duplex  Rec email from Fred Coppola that this cannot be scheduled w/o proper script  Called office and spoke with [facility]  Called patient and made her aware of the duplex appt   Biopsy will be scheduled per provider request

## 2019-03-19 NOTE — TELEPHONE ENCOUNTER
Marcella Werner from vascular center called and states that they received a referral for ambulatory referral to Vascular surgery  They don't see pt in the office  They only do   duplex and biopsy if needed  In order to schedule duplex  They would need a script for temporal artery duplex       thanks        [phone]

## 2019-03-20 ENCOUNTER — HOSPITAL ENCOUNTER (OUTPATIENT)
Dept: NON INVASIVE DIAGNOSTICS | Facility: CLINIC | Age: 64
Discharge: HOME | End: 2019-03-20
Payer: COMMERCIAL

## 2019-03-20 DIAGNOSIS — R51.9 HEADACHE: ICD-10-CM

## 2019-03-20 PROCEDURE — 93882 EXTRACRANIAL UNI/LTD STUDY: CPT

## 2019-03-20 PROCEDURE — 93886 INTRACRANIAL COMPLETE STUDY: CPT | Performed by: SURGERY

## 2019-03-21 ENCOUNTER — OFFICE VISIT (OUTPATIENT)
Dept: PHYSICAL THERAPY | Facility: CLINIC | Age: 64
End: 2019-03-21
Payer: COMMERCIAL

## 2019-03-21 DIAGNOSIS — M70.62 GREATER TROCHANTERIC BURSITIS OF LEFT HIP: Primary | ICD-10-CM

## 2019-03-21 PROCEDURE — 97140 MANUAL THERAPY 1/> REGIONS: CPT

## 2019-03-21 PROCEDURE — 97110 THERAPEUTIC EXERCISES: CPT

## 2019-03-21 NOTE — PROGRESS NOTES
Daily Note     Today's date: 3/21/2019  Patient name: Huyen Dejesus  : 1955  MRN: [id number]  Referring provider: Jose Juan Abarca  Dx:   Encounter Diagnosis     ICD-10-CM    1  Greater trochanteric bursitis of left hip M70 62        Start Time: 1430  Stop Time: 1530  Total time in clinic (min): 60 minutes    Subjective: Patient states she is feeling much better - pain 1/10 left hip; able to get her sock on the left foot; went for       Objective: See treatment diary below      Precautions: TIA last march, fibromyalgia, fusion of C3-5 due to stenosis     Specialty Daily Treatment Diary     Manual  1  IE 2 3 4    LAD B        Runners stick to B ITB                                    Exercise Diary   3-14 318  3-21    Stationary bike pre  X 10 min lvl 2  X 10 min - lvl 1  NS lvl 1 x 10 min  Continue w NS    HS, glute, piri stretching  w SOS HS 3 x 30 sec  w SOS HS 3 x 30 sec  w SOS HS 3 x 30 sec     Cross body stretch (gentle)  Manual 3 x 30 sec ( gentle) nv  Not today      TrA progressions  Isos, march - 2 x 10 ea  Isos, march 2 x 10 ea  Isos, march 2 x 10 ea     Bridging   2 x 10  2 x 10  2 x 10     SLR and hip abd  SLR x 10 &   S/L hip abd  SLR x 10 w assist  S/L hip abd 2 x 10 - no assist  SLR x 10 no assist; S/L hip abd 2 x 10     Hip abd walking        Stair training     Gluts sets 2 x 10     Sit<>stand           Hip/knee flex w peanut x 20  Hip/knee flex w peanut x 20  SK to C x 10       Bent knee fallout L 1 x 10 Bent knee fallout Left 2  x 10  Bent knee fallout left 2 x 10  Trial RTB        S/L CS 2 x 10          Trial for/lat step ups                                                                Modalities        Heat         Ice   X 10 min  X 10 min  X 10 min  X 10 min                    Assessment: Tolerated treatment well   Patient would benefit from continued PT; patient reports good response to Nustep this session - will continue next visit; left lateral hip remains tender with palpation @ greater trochanter but less than last session; patient's overall mobility less guarded & tolerated all therex with increased ease; patient with good response to ice applications - will continue at home       Plan: Continue per plan of care   Progress to WB therex next session as tolerated

## 2019-03-25 ENCOUNTER — OFFICE VISIT (OUTPATIENT)
Dept: PHYSICAL THERAPY | Facility: CLINIC | Age: 64
End: 2019-03-25
Payer: COMMERCIAL

## 2019-03-25 DIAGNOSIS — M70.62 GREATER TROCHANTERIC BURSITIS OF LEFT HIP: Primary | ICD-10-CM

## 2019-03-25 PROCEDURE — 97112 NEUROMUSCULAR REEDUCATION: CPT

## 2019-03-25 PROCEDURE — 97110 THERAPEUTIC EXERCISES: CPT

## 2019-03-25 NOTE — PROGRESS NOTES
Daily Note     Today's date: 3/25/2019  Patient name: Thiago Hawley  : 1955  MRN: [id number]  Referring provider: Neno Hoff  Dx:   Encounter Diagnosis     ICD-10-CM    1   Greater trochanteric bursitis of left hip M70 62        Start Time: 1430  Stop Time: 1520  Total time in clinic (min): 50 minutes    Subjective: Pain 2/10 left hip; was able to clean out some flower beds & even knelt down on one knee - getting up from the ground was challenging but she was able to do it; has to leave early due to an appt with her PCP     Objective: See treatment diary below      Precautions: TIA last march, fibromyalgia, fusion of C3-5 due to stenosis     Specialty Daily Treatment Diary     Manual  1  IE 2 3 4 5   LAD B        Runners stick to B ITB     Not today                                Exercise Diary   3-14 318  3 3-25   Stationary bike pre  X 10 min lvl 2  X 10 min - lvl 1  NS lvl 1 x 10 min  NS lvl 2 x 10 min    HS, glute, piri stretching  w SOS HS 3 x 30 sec  w SOS HS 3 x 30 sec  w SOS HS 3 x 30 sec  HS 3 x 30 sec    Cross body stretch (gentle)  Manual 3 x 30 sec ( gentle) nv  Not today      TrA progressions  , march - 2 x 10 ea  Isos, march 2 x 10 ea  Isos, march 2 x 10 ea  Isos march 2 x 10    Bridging   2 x 10  2 x 10  2 x 10  2 x 10    SLR and hip abd  SLR x 10 &   S/L hip abd  SLR x 10 w assist  S/L hip abd 2 x 10 - no assist  SLR x 10 no assist; S/L hip abd 2 x 10  SLR 2 x 10  & S/L left  hip abd x 12   Hip abd walking        Stair training     Gluts sets 2 x 10  No    Sit<>stand           Hip/knee flex w peanut x 20  Hip/knee flex w peanut x 20  SK to C x 10  B hip flex supine w peanut 2 x 10 - no      Bent knee fallout L 1 x 10 Bent knee fallout Left 2  x 10  Bent knee fallout left 2 x 10  RTB 2x 10        S/L CS 2 x 10  S/L CS 2 x 10         Step up 6 inch  forward & lateral x 15 ea                 Squats @ bar 2 x 10                 LAQ 2 x 10 hold 5                                Modalities Heat         Ice   X 10 min  X 10 min  X 10 min  X 10 min - home                    Assessment: Tolerated treatment well  Patient would benefit from continued PT; patient was able to perform the  6 inch step ups without pain but needed to focus on performing hip flexion; SLR much improved with 2 sets of 10; able to add resistance to clamshells in supine; STM deferred to left lateral hip as patient needed to leave for an MD appt        Plan: Continue per plan of care   Patient to apply ice to left hip @ home tonight

## 2019-03-26 LAB
CRP SERPL-MCNC: 5.3 MG/L (ref 0–4.9)
ERYTHROCYTE [SEDIMENTATION RATE] IN BLOOD BY WESTERGREN METHOD: 7 MM/HR (ref 0–40)

## 2019-03-28 ENCOUNTER — APPOINTMENT (OUTPATIENT)
Dept: PHYSICAL THERAPY | Facility: CLINIC | Age: 64
End: 2019-03-28
Payer: COMMERCIAL

## 2019-03-28 ENCOUNTER — HOSPITAL ENCOUNTER (EMERGENCY)
Facility: HOSPITAL | Age: 64
Discharge: HOME | End: 2019-03-28
Attending: EMERGENCY MEDICINE | Admitting: EMERGENCY MEDICINE
Payer: COMMERCIAL

## 2019-03-28 VITALS
RESPIRATION RATE: 18 BRPM | OXYGEN SATURATION: 100 % | TEMPERATURE: 97.8 F | DIASTOLIC BLOOD PRESSURE: 78 MMHG | HEART RATE: 88 BPM | SYSTOLIC BLOOD PRESSURE: 135 MMHG

## 2019-03-28 DIAGNOSIS — R51.9 HEADACHE: Primary | ICD-10-CM

## 2019-03-28 DIAGNOSIS — R11.0 NAUSEA: ICD-10-CM

## 2019-03-28 LAB
ALBUMIN SERPL BCP-MCNC: 3.8 G/DL (ref 3.5–5)
ALP SERPL-CCNC: 86 U/L (ref 46–116)
ALT SERPL W P-5'-P-CCNC: 28 U/L (ref 12–78)
ANION GAP SERPL CALCULATED.3IONS-SCNC: 10 MMOL/L (ref 4–13)
AST SERPL W P-5'-P-CCNC: 20 U/L (ref 5–45)
BASOPHILS # BLD AUTO: 0.04 THOUSANDS/ΜL (ref 0–0.1)
BASOPHILS NFR BLD AUTO: 1 % (ref 0–1)
BILIRUB SERPL-MCNC: 0.5 MG/DL (ref 0.2–1)
BILIRUB UR QL STRIP: NEGATIVE
BUN SERPL-MCNC: 13 MG/DL (ref 5–25)
CALCIUM SERPL-MCNC: 10.3 MG/DL (ref 8.3–10.1)
CHLORIDE SERPL-SCNC: 104 MMOL/L (ref 100–108)
CLARITY UR: CLEAR
CO2 SERPL-SCNC: 26 MMOL/L (ref 21–32)
COLOR UR: NORMAL
CREAT SERPL-MCNC: 0.91 MG/DL (ref 0.6–1.3)
EOSINOPHIL # BLD AUTO: 0.12 THOUSAND/ΜL (ref 0–0.61)
EOSINOPHIL NFR BLD AUTO: 2 % (ref 0–6)
ERYTHROCYTE [DISTWIDTH] IN BLOOD BY AUTOMATED COUNT: 12.9 % (ref 11.6–15.1)
GFR SERPL CREATININE-BSD FRML MDRD: 68 ML/MIN/1.73SQ M
GLUCOSE SERPL-MCNC: 113 MG/DL (ref 65–140)
GLUCOSE UR STRIP-MCNC: NEGATIVE MG/DL
HCT VFR BLD AUTO: 40.3 % (ref 34.8–46.1)
HGB BLD-MCNC: 13.5 G/DL (ref 11.5–15.4)
HGB UR QL STRIP.AUTO: NEGATIVE
IMM GRANULOCYTES # BLD AUTO: 0.01 THOUSAND/UL (ref 0–0.2)
IMM GRANULOCYTES NFR BLD AUTO: 0 % (ref 0–2)
KETONES UR STRIP-MCNC: NEGATIVE MG/DL
LEUKOCYTE ESTERASE UR QL STRIP: NEGATIVE
LIPASE SERPL-CCNC: 79 U/L (ref 73–393)
LYMPHOCYTES # BLD AUTO: 1.53 THOUSANDS/ΜL (ref 0.6–4.47)
LYMPHOCYTES NFR BLD AUTO: 24 % (ref 14–44)
MCH RBC QN AUTO: 28.4 PG (ref 26.8–34.3)
MCHC RBC AUTO-ENTMCNC: 33.5 G/DL (ref 31.4–37.4)
MCV RBC AUTO: 85 FL (ref 82–98)
MONOCYTES # BLD AUTO: 0.56 THOUSAND/ΜL (ref 0.17–1.22)
MONOCYTES NFR BLD AUTO: 9 % (ref 4–12)
NEUTROPHILS # BLD AUTO: 4.14 THOUSANDS/ΜL (ref 1.85–7.62)
NEUTS SEG NFR BLD AUTO: 64 % (ref 43–75)
NITRITE UR QL STRIP: NEGATIVE
NRBC BLD AUTO-RTO: 0 /100 WBCS
PH UR STRIP.AUTO: 7.5 [PH]
PLATELET # BLD AUTO: 267 THOUSANDS/UL (ref 149–390)
PMV BLD AUTO: 10.6 FL (ref 8.9–12.7)
POTASSIUM SERPL-SCNC: 4.4 MMOL/L (ref 3.5–5.3)
PROT SERPL-MCNC: 7.3 G/DL (ref 6.4–8.2)
PROT UR STRIP-MCNC: NEGATIVE MG/DL
RBC # BLD AUTO: 4.76 MILLION/UL (ref 3.81–5.12)
SODIUM SERPL-SCNC: 140 MMOL/L (ref 136–145)
SP GR UR STRIP.AUTO: 1.01 (ref 1–1.03)
UROBILINOGEN UR QL STRIP.AUTO: 0.2 E.U./DL
WBC # BLD AUTO: 6.4 THOUSAND/UL (ref 4.31–10.16)

## 2019-03-28 PROCEDURE — 96374 THER/PROPH/DIAG INJ IV PUSH: CPT

## 2019-03-28 PROCEDURE — 96361 HYDRATE IV INFUSION ADD-ON: CPT

## 2019-03-28 PROCEDURE — 99283 EMERGENCY DEPT VISIT LOW MDM: CPT

## 2019-03-28 PROCEDURE — 36415 COLL VENOUS BLD VENIPUNCTURE: CPT | Performed by: EMERGENCY MEDICINE

## 2019-03-28 PROCEDURE — 80053 COMPREHEN METABOLIC PANEL: CPT | Performed by: EMERGENCY MEDICINE

## 2019-03-28 PROCEDURE — 83690 ASSAY OF LIPASE: CPT | Performed by: EMERGENCY MEDICINE

## 2019-03-28 PROCEDURE — 96375 TX/PRO/DX INJ NEW DRUG ADDON: CPT

## 2019-03-28 PROCEDURE — 81003 URINALYSIS AUTO W/O SCOPE: CPT | Performed by: EMERGENCY MEDICINE

## 2019-03-28 PROCEDURE — 85025 COMPLETE CBC W/AUTO DIFF WBC: CPT | Performed by: EMERGENCY MEDICINE

## 2019-03-28 RX ORDER — ACETAMINOPHEN 325 MG/1
650 TABLET ORAL ONCE
Status: DISCONTINUED | OUTPATIENT
Start: 2019-03-28 | End: 2019-03-28

## 2019-03-28 RX ORDER — METOCLOPRAMIDE 10 MG/1
10 TABLET ORAL EVERY 6 HOURS
Qty: 10 TABLET | Refills: 0 | Status: SHIPPED | OUTPATIENT
Start: 2019-03-28 | End: 2019-03-31

## 2019-03-28 RX ORDER — BUTALBITAL, ACETAMINOPHEN AND CAFFEINE 50; 325; 40 MG/1; MG/1; MG/1
1 TABLET ORAL ONCE
Status: COMPLETED | OUTPATIENT
Start: 2019-03-28 | End: 2019-03-28

## 2019-03-28 RX ORDER — ONDANSETRON 2 MG/ML
4 INJECTION INTRAMUSCULAR; INTRAVENOUS ONCE
Status: COMPLETED | OUTPATIENT
Start: 2019-03-28 | End: 2019-03-28

## 2019-03-28 RX ORDER — METOCLOPRAMIDE HYDROCHLORIDE 5 MG/ML
10 INJECTION INTRAMUSCULAR; INTRAVENOUS ONCE
Status: COMPLETED | OUTPATIENT
Start: 2019-03-28 | End: 2019-03-28

## 2019-03-28 RX ADMIN — METOCLOPRAMIDE 10 MG: 5 INJECTION, SOLUTION INTRAMUSCULAR; INTRAVENOUS at 10:15

## 2019-03-28 RX ADMIN — SODIUM CHLORIDE 1000 ML: 0.9 INJECTION, SOLUTION INTRAVENOUS at 10:15

## 2019-03-28 RX ADMIN — BUTALBITAL, ACETAMINOPHEN AND CAFFEINE 1 TABLET: 50; 325; 40 TABLET ORAL at 10:18

## 2019-03-28 RX ADMIN — ONDANSETRON 4 MG: 2 INJECTION INTRAMUSCULAR; INTRAVENOUS at 10:15

## 2019-04-01 ENCOUNTER — OFFICE VISIT (OUTPATIENT)
Dept: PHYSICAL THERAPY | Facility: CLINIC | Age: 64
End: 2019-04-01
Payer: COMMERCIAL

## 2019-04-01 ENCOUNTER — APPOINTMENT (OUTPATIENT)
Dept: PHYSICAL THERAPY | Facility: CLINIC | Age: 64
End: 2019-04-01
Payer: COMMERCIAL

## 2019-04-01 DIAGNOSIS — M70.62 GREATER TROCHANTERIC BURSITIS OF LEFT HIP: Primary | ICD-10-CM

## 2019-04-01 PROCEDURE — 97110 THERAPEUTIC EXERCISES: CPT

## 2019-04-01 PROCEDURE — 97112 NEUROMUSCULAR REEDUCATION: CPT

## 2019-04-04 ENCOUNTER — OFFICE VISIT (OUTPATIENT)
Dept: PHYSICAL THERAPY | Facility: CLINIC | Age: 64
End: 2019-04-04
Payer: COMMERCIAL

## 2019-04-04 DIAGNOSIS — M70.62 GREATER TROCHANTERIC BURSITIS OF LEFT HIP: Primary | ICD-10-CM

## 2019-04-04 PROCEDURE — 97110 THERAPEUTIC EXERCISES: CPT

## 2019-04-04 PROCEDURE — 97112 NEUROMUSCULAR REEDUCATION: CPT

## 2019-04-08 ENCOUNTER — APPOINTMENT (OUTPATIENT)
Dept: PHYSICAL THERAPY | Facility: CLINIC | Age: 64
End: 2019-04-08
Payer: COMMERCIAL

## 2019-04-11 ENCOUNTER — OFFICE VISIT (OUTPATIENT)
Dept: FAMILY MEDICINE CLINIC | Facility: CLINIC | Age: 64
End: 2019-04-11
Payer: COMMERCIAL

## 2019-04-11 ENCOUNTER — OFFICE VISIT (OUTPATIENT)
Dept: PHYSICAL THERAPY | Facility: CLINIC | Age: 64
End: 2019-04-11
Payer: COMMERCIAL

## 2019-04-11 VITALS
SYSTOLIC BLOOD PRESSURE: 122 MMHG | HEART RATE: 84 BPM | RESPIRATION RATE: 16 BRPM | DIASTOLIC BLOOD PRESSURE: 84 MMHG | WEIGHT: 157 LBS | BODY MASS INDEX: 26.8 KG/M2 | TEMPERATURE: 97.3 F | HEIGHT: 64 IN

## 2019-04-11 DIAGNOSIS — M70.62 GREATER TROCHANTERIC BURSITIS OF LEFT HIP: Primary | ICD-10-CM

## 2019-04-11 DIAGNOSIS — Z12.39 SCREENING BREAST EXAMINATION: ICD-10-CM

## 2019-04-11 DIAGNOSIS — Z01.419 WELL WOMAN EXAM: Primary | ICD-10-CM

## 2019-04-11 PROBLEM — M48.00 SPINAL STENOSIS: Status: ACTIVE | Noted: 2018-12-31

## 2019-04-11 PROBLEM — F41.9 ANXIETY: Status: ACTIVE | Noted: 2019-04-11

## 2019-04-11 PROBLEM — C43.9 MELANOMA (HCC): Status: ACTIVE | Noted: 2019-04-11

## 2019-04-11 PROBLEM — M50.20 HERNIATED CERVICAL DISC: Status: ACTIVE | Noted: 2019-04-11

## 2019-04-11 PROBLEM — M79.7 FIBROMYALGIA: Status: ACTIVE | Noted: 2019-04-11

## 2019-04-11 PROCEDURE — 97112 NEUROMUSCULAR REEDUCATION: CPT

## 2019-04-11 PROCEDURE — 97110 THERAPEUTIC EXERCISES: CPT

## 2019-04-11 PROCEDURE — G8979 MOBILITY GOAL STATUS: HCPCS

## 2019-04-11 PROCEDURE — 99396 PREV VISIT EST AGE 40-64: CPT | Performed by: INTERNAL MEDICINE

## 2019-04-11 PROCEDURE — G8980 MOBILITY D/C STATUS: HCPCS

## 2019-04-11 PROCEDURE — G8978 MOBILITY CURRENT STATUS: HCPCS

## 2019-04-15 LAB
C TRACH RRNA CVX QL NAA+PROBE: NEGATIVE
CYTOLOGIST CVX/VAG CYTO: NORMAL
DX ICD CODE: NORMAL
N GONORRHOEA RRNA CVX QL NAA+PROBE: NEGATIVE
OTHER STN SPEC: NORMAL
OTHER STN SPEC: NORMAL
PATH REPORT.FINAL DX SPEC: NORMAL
SL AMB NOTE:: NORMAL
SL AMB SPECIMEN ADEQUACY: NORMAL
SL AMB TEST METHODOLOGY: NORMAL
T VAGINALIS RRNA SPEC QL NAA+PROBE: NEGATIVE

## 2019-05-20 ENCOUNTER — TRANSCRIBE ORDERS (OUTPATIENT)
Dept: PHYSICAL THERAPY | Facility: CLINIC | Age: 64
End: 2019-05-20

## 2019-05-20 DIAGNOSIS — M70.62 GREATER TROCHANTERIC BURSITIS OF LEFT HIP: Primary | ICD-10-CM

## 2019-06-06 ENCOUNTER — OFFICE VISIT (OUTPATIENT)
Dept: OBGYN CLINIC | Facility: CLINIC | Age: 64
End: 2019-06-06
Payer: COMMERCIAL

## 2019-06-06 VITALS
WEIGHT: 154.4 LBS | HEIGHT: 64 IN | BODY MASS INDEX: 26.36 KG/M2 | HEART RATE: 93 BPM | SYSTOLIC BLOOD PRESSURE: 110 MMHG | DIASTOLIC BLOOD PRESSURE: 73 MMHG

## 2019-06-06 DIAGNOSIS — M50.20 HERNIATED CERVICAL DISC: ICD-10-CM

## 2019-06-06 DIAGNOSIS — M70.62 TROCHANTERIC BURSITIS OF LEFT HIP: ICD-10-CM

## 2019-06-06 DIAGNOSIS — M54.12 CERVICAL MYELOPATHY WITH CERVICAL RADICULOPATHY (HCC): Primary | ICD-10-CM

## 2019-06-06 DIAGNOSIS — G95.9 CERVICAL MYELOPATHY WITH CERVICAL RADICULOPATHY (HCC): Primary | ICD-10-CM

## 2019-06-06 DIAGNOSIS — M48.02 SPINAL STENOSIS OF CERVICAL REGION: ICD-10-CM

## 2019-06-06 PROCEDURE — 99213 OFFICE O/P EST LOW 20 MIN: CPT | Performed by: ORTHOPAEDIC SURGERY

## 2019-06-06 RX ORDER — GABAPENTIN 600 MG/1
600 TABLET ORAL 2 TIMES DAILY
COMMUNITY
End: 2019-08-08

## 2019-06-20 ENCOUNTER — TRANSCRIBE ORDERS (OUTPATIENT)
Dept: ADMINISTRATIVE | Facility: HOSPITAL | Age: 64
End: 2019-06-20

## 2019-06-20 DIAGNOSIS — M47.812 CERVICAL SPONDYLOSIS WITHOUT MYELOPATHY: Primary | ICD-10-CM

## 2019-06-24 ENCOUNTER — OFFICE VISIT (OUTPATIENT)
Dept: NEUROLOGY | Facility: CLINIC | Age: 64
End: 2019-06-24
Payer: COMMERCIAL

## 2019-06-24 VITALS
BODY MASS INDEX: 26.12 KG/M2 | DIASTOLIC BLOOD PRESSURE: 76 MMHG | HEART RATE: 96 BPM | SYSTOLIC BLOOD PRESSURE: 112 MMHG | HEIGHT: 64 IN | WEIGHT: 153 LBS

## 2019-06-24 DIAGNOSIS — R51.9 CHRONIC DAILY HEADACHE: Primary | ICD-10-CM

## 2019-06-24 DIAGNOSIS — M48.9 CERVICAL SPINE DISEASE: ICD-10-CM

## 2019-06-24 DIAGNOSIS — G43.809 CERVICOGENIC MIGRAINE: ICD-10-CM

## 2019-06-24 PROCEDURE — 99215 OFFICE O/P EST HI 40 MIN: CPT | Performed by: PSYCHIATRY & NEUROLOGY

## 2019-06-24 RX ORDER — AMITRIPTYLINE HYDROCHLORIDE 25 MG/1
TABLET, FILM COATED ORAL
Qty: 60 TABLET | Refills: 2 | Status: ON HOLD | OUTPATIENT
Start: 2019-06-24 | End: 2019-09-06 | Stop reason: SDUPTHER

## 2019-06-24 RX ORDER — FAMOTIDINE 40 MG/1
40 TABLET, FILM COATED ORAL DAILY
COMMUNITY
End: 2019-08-27

## 2019-07-05 ENCOUNTER — HOSPITAL ENCOUNTER (OUTPATIENT)
Dept: RADIOLOGY | Facility: HOSPITAL | Age: 64
Discharge: HOME | End: 2019-07-05
Payer: COMMERCIAL

## 2019-07-05 DIAGNOSIS — M47.812 CERVICAL SPONDYLOSIS WITHOUT MYELOPATHY: ICD-10-CM

## 2019-07-05 PROCEDURE — 72141 MRI NECK SPINE W/O DYE: CPT

## 2019-07-11 ENCOUNTER — OFFICE VISIT (OUTPATIENT)
Dept: NEUROLOGY | Facility: CLINIC | Age: 64
End: 2019-07-11
Payer: COMMERCIAL

## 2019-07-11 VITALS
WEIGHT: 153 LBS | HEART RATE: 106 BPM | BODY MASS INDEX: 26.26 KG/M2 | DIASTOLIC BLOOD PRESSURE: 85 MMHG | SYSTOLIC BLOOD PRESSURE: 128 MMHG

## 2019-07-11 DIAGNOSIS — G43.109 MIGRAINE WITH AURA AND WITHOUT STATUS MIGRAINOSUS, NOT INTRACTABLE: ICD-10-CM

## 2019-07-11 DIAGNOSIS — Z86.59 HISTORY OF ANXIETY: Primary | ICD-10-CM

## 2019-07-11 DIAGNOSIS — R25.1 TREMORS OF NERVOUS SYSTEM: ICD-10-CM

## 2019-07-11 DIAGNOSIS — R29.898 MUSCLE RIGIDITY: ICD-10-CM

## 2019-07-11 DIAGNOSIS — R26.2 DIFFICULTY WALKING: ICD-10-CM

## 2019-07-11 PROCEDURE — 99214 OFFICE O/P EST MOD 30 MIN: CPT | Performed by: PSYCHIATRY & NEUROLOGY

## 2019-07-11 NOTE — PROGRESS NOTES
Return NeuroOutpatient Note        Nixon Farias  [id number]  66 y o   1955       Tremors (Right leg and hand, started a week ago); Headache; and Memory        History obtained from: Patient     HPI/Subjective:    Ms Elissa López is a 58 yo F that returns as follow up  She had initially presented to [facility] in March of 2018 with left sided weakness and facial droop  Her stroke work up was negative  No clear etiology was found  Since then she was found to have severe arthritis of cervical spine at [facility]  She had fusion of C3-5  Initially it had helped her paresthesia in hands  Now it seems pain is returning  She has f/u cervical spine imaging upcoming in a week  Her repeat MRI brain showed chronic microvascular disease  Patient was recently seen by me 3 weeks ago for headache  We had placed her on elavil which she tolerated but says it hasn't helped with her headaches  Patient is not a candidate for propranolol or verapamil because she has history of autonomic hypotension  She was seen by EDUARDO Langford   Sed rate, crp were ordered and they are normal      She's on gabapentin 300mg bid which is lower than previous dose 600mg tid  Today patient returns for tremors  She says she had bad episode of hiccups and since gets right leg tremor  She tends to continuously tap her right leg  If distracted, it stops  She suffers from a lot of anxiety         Past Medical History:   Diagnosis Date    Anesthesia complication     difficult to wake up    GERD (gastroesophageal reflux disease)     Lazy eye     resolved: 3/27/17    Osteopenia     with joint pain-elevated DEV    Tinnitus     Wears glasses     for reading     Social History     Socioeconomic History    Marital status: /Civil Union     Spouse name: Not on file    Number of children: Not on file    Years of education: Not on file    Highest education level: Not on file   Occupational History    Not on file   Social Needs    Financial resource strain: Not on file    Food insecurity:     Worry: Not on file     Inability: Not on file    Transportation needs:     Medical: Not on file     Non-medical: Not on file   Tobacco Use    Smoking status: Never Smoker    Smokeless tobacco: Never Used   Substance and Sexual Activity    Alcohol use: No    Drug use: No    Sexual activity: Not Currently   Lifestyle    Physical activity:     Days per week: Not on file     Minutes per session: Not on file    Stress: Not on file   Relationships    Social connections:     Talks on phone: Not on file     Gets together: Not on file     Attends Oriental orthodox service: Not on file     Active member of club or organization: Not on file     Attends meetings of clubs or organizations: Not on file     Relationship status: Not on file    Intimate partner violence:     Fear of current or ex partner: Not on file     Emotionally abused: Not on file     Physically abused: Not on file     Forced sexual activity: Not on file   Other Topics Concern    Not on file   Social History Narrative    Not on file     Family History   Problem Relation Age of Onset    Heart disease Mother     Stroke Mother 58    COPD Mother     Arthritis Mother     Hypertension Father     Kidney disease Brother         kidney transplant    No Known Problems Sister     No Known Problems Maternal Aunt     No Known Problems Maternal Uncle     No Known Problems Paternal Aunt     No Known Problems Paternal Uncle     No Known Problems Maternal Grandmother     No Known Problems Maternal Grandfather     No Known Problems Paternal Grandmother     No Known Problems Paternal Grandfather     ADD / ADHD Neg Hx     Anesthesia problems Neg Hx     Cancer Neg Hx     Clotting disorder Neg Hx     Collagen disease Neg Hx     Diabetes Neg Hx     Dislocations Neg Hx     Learning disabilities Neg Hx     Neurological problems Neg Hx     Osteoporosis Neg Hx     Rheumatologic disease Neg Hx  Scoliosis Neg Hx     Vascular Disease Neg Hx      Allergies   Allergen Reactions    Demerol [Meperidine] Lightheadedness     Reaction Date: 11Jan2006;     Hydrocodone     Sulfa Antibiotics Hives     Reaction Date: 11Jan2006;     Toradol [Ketorolac Tromethamine]      Current Outpatient Medications on File Prior to Visit   Medication Sig Dispense Refill    acetaminophen (TYLENOL) 325 mg tablet Take 2 tablets (650 mg total) by mouth every 8 (eight) hours as needed for mild pain or headaches 30 tablet 0    ALPRAZolam (XANAX) 0 5 mg tablet Take by mouth 3 (three) times a day as needed for anxiety      amitriptyline (ELAVIL) 25 mg tablet Take 1 tab at bedtime for one week and if tolerated take 2 tabs at bedtime  60 tablet 2    Blood Pressure Monitoring (BLOOD PRESSURE CUFF) MISC Take BP PRN when relaxed  1 each 0    brimonidine (ALPHAGAN P) 0 1 %       calcium carbonate 1250 MG capsule Take 1,250 mg by mouth      diclofenac sodium (VOLTAREN) 1 % Apply 2 g topically 4 (four) times a day 1 Tube 1    famotidine (PEPCID) 40 MG tablet Take 40 mg by mouth daily      gabapentin (NEURONTIN) 600 MG tablet Take 600 mg by mouth 3 (three) times a day      magnesium oxide (MAG-OX) 400 mg Take 1 tablet (400 mg total) by mouth daily 30 tablet 3    omeprazole (PriLOSEC) 40 MG capsule Take 40 mg by mouth daily   0     No current facility-administered medications on file prior to visit  Review of Systems   Refer to positive review of systems in HPI  Review of Systems   Neurological: Positive for tremors and headaches  All other systems reviewed and are negative            Vitals:    07/11/19 1555   BP: 128/85   BP Location: Right arm   Patient Position: Sitting   Cuff Size: Adult   Pulse: (!) 106   Weight: 69 4 kg (153 lb)       PHYSICAL EXAM:  Appearance: No Acute Distress  Ophthalmoscopic: Disc Flat, Normal fundus  Mental status:  Orientation: Awake, Alert, and Orientedx3  Memory: Registation 3/3 Recall 3/3  Attention: normal  Knowledge: good  Language: No aphasia  Speech: No dysarthria  Cranial Nerves:  2 No Visual Defect on Confrontation, Pupils round, equal, reactive to light  3,4,6 Extraocular Movements Intact, no nystagmus; strabismus at baseline   5 Facial Sensation Intact  7 No facial asymmetry  8 Intact hearing  9,10 Palate symmetric, normal gag  11 Good shoulder shrug  12 Tongue Midline  Gait: Stable  Coordination: No ataxia with finger to nose testing, and heel to shin  Sensory: Intact, Symmetric to pinprick, light touch, vibration, and joint position  Muscle Tone: rigidity in both LE  Muscle exam:  Arm Right Left Leg Right Left   Deltoid 5/5 5/5 Iliopsoas 5/5 5/5   Biceps 5/5 5/5 Quads 5/5 5/5   Triceps 5/5 5/5 Hamstrings 5/5 5/5   Wrist Extension 5/5 5/5 Ankle Dorsi Flexion 5/5 5/5   Wrist Flexion 5/5 5/5 Ankle Plantar Flexion 5/5 5/5   Interossei 5/5 5/5 Ankle Eversion 5/5 5/5   APB 5/5 5/5 Ankle Inversion 5/5 5/5       Reflexes   RJ BJ TJ KJ AJ Plantars Cantrell's   Right 2+ 2+ 2+ 3+ 2+ Downgoing Not present   Left 2+ 2+ 2+ 3+ 2+ Downgoing Not present     Personal review of  Labs:                  Diagnoses and all orders for this visit:        1  History of anxiety     2  Tremors of nervous system  MRI brain with and without contrast   3  Muscle rigidity  MRI brain with and without contrast   4  Difficulty walking  MRI brain with and without contrast   5  Migraine with aura and without status migrainosus, not intractable  Erenumab-aooe (AIMOVIG) 140 MG/ML SOAJ       Since elavil is not helping and she's having odd reactions, will have her stop it  Her tremor appears to have psychological component but can't be so sure  She had right facial droop for sometime and then resolved during encounter  Her exam changes with time  She does have marked rigidity in LE which is concerning for parkinsonism but it can not start this rapidly as she was fine 3 weeks ago   For now will just monitor to see if sxs resolve on their own  Will get MRI brain to r/o any acute process  Will place her on Aimovig 140mg monthly for migraines  Will resume magnesium to her regimen  She's not a candidate for antihypertensives due to h/o dysautonomia            Total time of encounter:  30 min  More than 50% of the time was used in counseling and/or coordination of care  Extent of counseling and/or coordination of care        MD [facility]  [address]  [place]   [phone]

## 2019-07-30 ENCOUNTER — HOSPITAL ENCOUNTER (EMERGENCY)
Facility: HOSPITAL | Age: 64
Discharge: HOME | End: 2019-07-30
Attending: EMERGENCY MEDICINE | Admitting: EMERGENCY MEDICINE
Payer: COMMERCIAL

## 2019-07-30 ENCOUNTER — HOSPITAL ENCOUNTER (OUTPATIENT)
Dept: RADIOLOGY | Facility: HOSPITAL | Age: 64
Discharge: HOME | End: 2019-07-30
Attending: PSYCHIATRY & NEUROLOGY
Payer: COMMERCIAL

## 2019-07-30 ENCOUNTER — APPOINTMENT (EMERGENCY)
Dept: RADIOLOGY | Facility: HOSPITAL | Age: 64
End: 2019-07-30
Payer: COMMERCIAL

## 2019-07-30 VITALS
HEIGHT: 64 IN | SYSTOLIC BLOOD PRESSURE: 148 MMHG | OXYGEN SATURATION: 98 % | TEMPERATURE: 98.2 F | BODY MASS INDEX: 26.29 KG/M2 | DIASTOLIC BLOOD PRESSURE: 84 MMHG | HEART RATE: 94 BPM | RESPIRATION RATE: 16 BRPM | WEIGHT: 154 LBS

## 2019-07-30 DIAGNOSIS — R25.1 TREMORS OF NERVOUS SYSTEM: ICD-10-CM

## 2019-07-30 DIAGNOSIS — R29.898 MUSCLE RIGIDITY: ICD-10-CM

## 2019-07-30 DIAGNOSIS — R26.2 DIFFICULTY WALKING: ICD-10-CM

## 2019-07-30 DIAGNOSIS — R25.2 LEG CRAMPING: Primary | ICD-10-CM

## 2019-07-30 LAB
ALBUMIN SERPL BCP-MCNC: 3.6 G/DL (ref 3.5–5)
ALP SERPL-CCNC: 81 U/L (ref 46–116)
ALT SERPL W P-5'-P-CCNC: 28 U/L (ref 12–78)
ANION GAP SERPL CALCULATED.3IONS-SCNC: 6 MMOL/L (ref 4–13)
APTT PPP: 25 SECONDS (ref 23–37)
AST SERPL W P-5'-P-CCNC: 20 U/L (ref 5–45)
BASOPHILS # BLD AUTO: 0.04 THOUSANDS/ΜL (ref 0–0.1)
BASOPHILS NFR BLD AUTO: 1 % (ref 0–1)
BILIRUB SERPL-MCNC: 0.4 MG/DL (ref 0.2–1)
BUN SERPL-MCNC: 12 MG/DL (ref 5–25)
CALCIUM SERPL-MCNC: 9 MG/DL (ref 8.3–10.1)
CHLORIDE SERPL-SCNC: 103 MMOL/L (ref 100–108)
CK MB SERPL-MCNC: 1.7 % (ref 0–2.5)
CK MB SERPL-MCNC: 2.8 NG/ML (ref 0–5)
CK SERPL-CCNC: 161 U/L (ref 26–192)
CO2 SERPL-SCNC: 31 MMOL/L (ref 21–32)
CREAT SERPL-MCNC: 0.87 MG/DL (ref 0.6–1.3)
EOSINOPHIL # BLD AUTO: 0.13 THOUSAND/ΜL (ref 0–0.61)
EOSINOPHIL NFR BLD AUTO: 2 % (ref 0–6)
ERYTHROCYTE [DISTWIDTH] IN BLOOD BY AUTOMATED COUNT: 13.5 % (ref 11.6–15.1)
GFR SERPL CREATININE-BSD FRML MDRD: 72 ML/MIN/1.73SQ M
GLUCOSE SERPL-MCNC: 97 MG/DL (ref 65–140)
HCT VFR BLD AUTO: 42.9 % (ref 34.8–46.1)
HGB BLD-MCNC: 13.7 G/DL (ref 11.5–15.4)
IMM GRANULOCYTES # BLD AUTO: 0.02 THOUSAND/UL (ref 0–0.2)
IMM GRANULOCYTES NFR BLD AUTO: 0 % (ref 0–2)
INR PPP: 0.98 (ref 0.86–1.16)
LYMPHOCYTES # BLD AUTO: 1.49 THOUSANDS/ΜL (ref 0.6–4.47)
LYMPHOCYTES NFR BLD AUTO: 23 % (ref 14–44)
MAGNESIUM SERPL-MCNC: 2.1 MG/DL (ref 1.6–2.6)
MCH RBC QN AUTO: 27.4 PG (ref 26.8–34.3)
MCHC RBC AUTO-ENTMCNC: 31.9 G/DL (ref 31.4–37.4)
MCV RBC AUTO: 86 FL (ref 82–98)
MONOCYTES # BLD AUTO: 0.62 THOUSAND/ΜL (ref 0.17–1.22)
MONOCYTES NFR BLD AUTO: 9 % (ref 4–12)
NEUTROPHILS # BLD AUTO: 4.32 THOUSANDS/ΜL (ref 1.85–7.62)
NEUTS SEG NFR BLD AUTO: 65 % (ref 43–75)
NRBC BLD AUTO-RTO: 0 /100 WBCS
PHOSPHATE SERPL-MCNC: 3.9 MG/DL (ref 2.3–4.1)
PLATELET # BLD AUTO: 279 THOUSANDS/UL (ref 149–390)
PMV BLD AUTO: 10.5 FL (ref 8.9–12.7)
POTASSIUM SERPL-SCNC: 4.7 MMOL/L (ref 3.5–5.3)
PROT SERPL-MCNC: 7.3 G/DL (ref 6.4–8.2)
PROTHROMBIN TIME: 10.3 SECONDS (ref 9.4–11.7)
RBC # BLD AUTO: 5 MILLION/UL (ref 3.81–5.12)
SODIUM SERPL-SCNC: 140 MMOL/L (ref 136–145)
WBC # BLD AUTO: 6.62 THOUSAND/UL (ref 4.31–10.16)

## 2019-07-30 PROCEDURE — A9585 GADOBUTROL INJECTION: HCPCS | Performed by: PSYCHIATRY & NEUROLOGY

## 2019-07-30 PROCEDURE — 80053 COMPREHEN METABOLIC PANEL: CPT | Performed by: PHYSICIAN ASSISTANT

## 2019-07-30 PROCEDURE — 36415 COLL VENOUS BLD VENIPUNCTURE: CPT | Performed by: PHYSICIAN ASSISTANT

## 2019-07-30 PROCEDURE — 93970 EXTREMITY STUDY: CPT

## 2019-07-30 PROCEDURE — 83735 ASSAY OF MAGNESIUM: CPT | Performed by: PHYSICIAN ASSISTANT

## 2019-07-30 PROCEDURE — 85610 PROTHROMBIN TIME: CPT | Performed by: PHYSICIAN ASSISTANT

## 2019-07-30 PROCEDURE — 70553 MRI BRAIN STEM W/O & W/DYE: CPT

## 2019-07-30 PROCEDURE — 96374 THER/PROPH/DIAG INJ IV PUSH: CPT

## 2019-07-30 PROCEDURE — 85730 THROMBOPLASTIN TIME PARTIAL: CPT | Performed by: PHYSICIAN ASSISTANT

## 2019-07-30 PROCEDURE — 85025 COMPLETE CBC W/AUTO DIFF WBC: CPT | Performed by: PHYSICIAN ASSISTANT

## 2019-07-30 PROCEDURE — 82553 CREATINE MB FRACTION: CPT | Performed by: PHYSICIAN ASSISTANT

## 2019-07-30 PROCEDURE — 84100 ASSAY OF PHOSPHORUS: CPT | Performed by: PHYSICIAN ASSISTANT

## 2019-07-30 PROCEDURE — 99284 EMERGENCY DEPT VISIT MOD MDM: CPT

## 2019-07-30 PROCEDURE — 96361 HYDRATE IV INFUSION ADD-ON: CPT

## 2019-07-30 PROCEDURE — 82550 ASSAY OF CK (CPK): CPT | Performed by: PHYSICIAN ASSISTANT

## 2019-07-30 RX ORDER — METHOCARBAMOL 500 MG/1
500 TABLET, FILM COATED ORAL 2 TIMES DAILY
Qty: 20 TABLET | Refills: 0 | Status: SHIPPED | OUTPATIENT
Start: 2019-07-30 | End: 2019-09-07 | Stop reason: HOSPADM

## 2019-07-30 RX ORDER — DIAZEPAM 5 MG/ML
5 INJECTION, SOLUTION INTRAMUSCULAR; INTRAVENOUS ONCE
Status: COMPLETED | OUTPATIENT
Start: 2019-07-30 | End: 2019-07-30

## 2019-07-30 RX ADMIN — DIAZEPAM 5 MG: 5 INJECTION, SOLUTION INTRAMUSCULAR; INTRAVENOUS at 10:49

## 2019-07-30 RX ADMIN — SODIUM CHLORIDE 1000 ML: 0.9 INJECTION, SOLUTION INTRAVENOUS at 10:48

## 2019-07-30 RX ADMIN — GADOBUTROL 7.5 ML: 604.72 INJECTION INTRAVENOUS at 15:48

## 2019-07-30 NOTE — ED NOTES
Patient asleep  Awakens to verbal   States she is still intermittently getting spasms in her legs, but none at this time  Side rails up x 2 and call bell within reach       Rudy Moseley RN  07/30/19 9424

## 2019-07-30 NOTE — ED PROVIDER NOTES
History  Chief Complaint   Patient presents with    Generalized Body Aches     c/o waking up with generalized body aches, is very shakey     59year old female hx fibromyalgia presents with leg cramping x1 day  She states that she has had leg cramping in the past however this time it was very severe  She states she woke up for the this morning and had severe leg cramping  It is bilateral and equal distribution  She has known history lower extremity rigidity and cramping pain  She was due to get an MRI later today to determine a source for it  She has been taking gabapentin as prescribed however it is not helped much  She last took it last night  She has decreased appetite  No problems with urination  No numbness or tingling  No back pain  No weakness  She was supposed to start [employer] for headaches however has not had it approved by insurance  No chest pain, shortness of breath, difficulty breathing, abdominal pain, nausea, vomiting, diarrhea, constipation, dizziness, lightheadedness, weakness  No use of statin  No leg swelling  Prior to Admission Medications   Prescriptions Last Dose Informant Patient Reported? Taking? ALPRAZolam (XANAX) 0 5 mg tablet Past Week at Unknown time  Yes Yes   Sig: Take by mouth 3 (three) times a day as needed for anxiety   Blood Pressure Monitoring (BLOOD PRESSURE CUFF) MISC   No No   Sig: Take BP PRN when relaxed  Erenumab-aooe (AIMOVIG) 140 MG/ML SOAJ   No No   Sig: Inject 140 mg under the skin every 30 (thirty) days   acetaminophen (TYLENOL) 325 mg tablet Unknown at Unknown time  No No   Sig: Take 2 tablets (650 mg total) by mouth every 8 (eight) hours as needed for mild pain or headaches   amitriptyline (ELAVIL) 25 mg tablet 7/29/2019 at 2030  No Yes   Sig: Take 1 tab at bedtime for one week and if tolerated take 2 tabs at bedtime     brimonidine (ALPHAGAN P) 0 1 % 7/29/2019 at 0700  Yes Yes   calcium carbonate 1250 MG capsule 7/29/2019 at 0700  Yes Yes   Sig: Take 1,250 mg by mouth   diclofenac sodium (VOLTAREN) 1 % Unknown at Unknown time  No No   Sig: Apply 2 g topically 4 (four) times a day   famotidine (PEPCID) 40 MG tablet 7/30/2019 at 0600 Self Yes Yes   Sig: Take 40 mg by mouth daily   gabapentin (NEURONTIN) 600 MG tablet 7/29/2019 at 2030  Yes Yes   Sig: Take 600 mg by mouth 2 (two) times a day    magnesium oxide (MAG-OX) 400 mg 7/29/2019 at 0700  No Yes   Sig: Take 1 tablet (400 mg total) by mouth daily   omeprazole (PriLOSEC) 40 MG capsule   Yes No   Sig: Take 40 mg by mouth daily       Facility-Administered Medications: None       Past Medical History:   Diagnosis Date    Anesthesia complication     difficult to wake up    Fibromyalgia     GERD (gastroesophageal reflux disease)     Lazy eye     resolved: 3/27/17    Osteopenia     with joint pain-elevated DEV    Tinnitus     Wears glasses     for reading       Past Surgical History:   Procedure Laterality Date    ABDOMINAL SURGERY      lysis of adhesions x 2    APPENDECTOMY      CERVICAL FUSION      CHOLECYSTECTOMY      open    DILATION AND CURETTAGE OF UTERUS      NEUROMA EXCISION Right 5/26/2017    Procedure: EXCISION MASS / FIBROMA FOOT;  Surgeon: Oriana Valero DPM;  Location: [facility];  Service:     RIGHT OOPHORECTOMY      WISDOM TOOTH EXTRACTION      x4       Family History   Problem Relation Age of Onset    Heart disease Mother     Stroke Mother 58    COPD Mother     Arthritis Mother     Hypertension Father     Kidney disease Brother         kidney transplant    No Known Problems Sister     No Known Problems Maternal Aunt     No Known Problems Maternal Uncle     No Known Problems Paternal Aunt     No Known Problems Paternal Uncle     No Known Problems Maternal Grandmother     No Known Problems Maternal Grandfather     No Known Problems Paternal Grandmother     No Known Problems Paternal Grandfather     ADD / ADHD Neg Hx     Anesthesia problems Neg Hx     Cancer Neg Hx  Clotting disorder Neg Hx     Collagen disease Neg Hx     Diabetes Neg Hx     Dislocations Neg Hx     Learning disabilities Neg Hx     Neurological problems Neg Hx     Osteoporosis Neg Hx     Rheumatologic disease Neg Hx     Scoliosis Neg Hx     Vascular Disease Neg Hx      I have reviewed and agree with the history as documented  Social History     Tobacco Use    Smoking status: Never Smoker    Smokeless tobacco: Never Used   Substance Use Topics    Alcohol use: No    Drug use: No        Review of Systems   Constitutional: Negative for chills and fever  HENT: Negative for sneezing and sore throat  Respiratory: Negative for cough and shortness of breath  Cardiovascular: Negative for chest pain, palpitations and leg swelling  Gastrointestinal: Negative for abdominal pain, constipation, diarrhea, nausea and vomiting  Musculoskeletal: Positive for myalgias  Negative for back pain, gait problem and joint swelling  Skin: Negative for color change, pallor, rash and wound  Neurological: Negative for dizziness, syncope, weakness, light-headedness, numbness and headaches  All other systems reviewed and are negative  Physical Exam  Physical Exam   Constitutional: She appears well-developed and well-nourished  No distress  HENT:   Head: Normocephalic and atraumatic  Nose: Nose normal    Eyes: EOM are normal    Neck: Normal range of motion  Cardiovascular: Normal rate, regular rhythm, normal heart sounds and intact distal pulses  Exam reveals no gallop and no friction rub  No murmur heard  Pulmonary/Chest: Effort normal and breath sounds normal  No stridor  No respiratory distress  She has no wheezes  She has no rales  Sp02 is 98% indicating adequate oxygenation on room air   Musculoskeletal:   Bilateral legs nontender to palpation, no swelling  Sensation intact  Pedal pulses 2+  While distracted no pain on exam  Will have occasional flares of bilateral leg cramping  Skin: Skin is warm and dry  Capillary refill takes less than 2 seconds  No rash noted  She is not diaphoretic  No erythema  No pallor  Nursing note and vitals reviewed        Vital Signs  ED Triage Vitals   Temperature Pulse Respirations Blood Pressure SpO2   07/30/19 0951 07/30/19 0954 07/30/19 0954 07/30/19 0954 07/30/19 0954   98 2 °F (36 8 °C) 100 18 166/96 98 %      Temp Source Heart Rate Source Patient Position - Orthostatic VS BP Location FiO2 (%)   07/30/19 0951 07/30/19 0954 07/30/19 0954 07/30/19 0954 --   Tympanic Monitor Lying Right arm       Pain Score       07/30/19 0954       7           Vitals:    07/30/19 0954 07/30/19 1130 07/30/19 1230 07/30/19 1330   BP: 166/96 140/83 150/85 148/84   Pulse: 100 90 98 94   Patient Position - Orthostatic VS: Lying Lying Lying Lying         Visual Acuity      ED Medications  Medications   sodium chloride 0 9 % bolus 1,000 mL (0 mL Intravenous Stopped 7/30/19 1442)   diazepam (VALIUM) injection 5 mg (5 mg Intravenous Given 7/30/19 1049)       Diagnostic Studies  Results Reviewed     Procedure Component Value Units Date/Time    CKMB [[id number]]  (Normal) Collected:  07/30/19 1033    Lab Status:  Final result Specimen:  Blood from Arm, Right Updated:  07/30/19 1144     CK-MB Index 1 7 %      CK-MB 2 8 ng/mL     Magnesium [[id number]]  (Normal) Collected:  07/30/19 1033    Lab Status:  Final result Specimen:  Blood from Arm, Right Updated:  07/30/19 1117     Magnesium 2 1 mg/dL     Phosphorus [[id number]]  (Normal) Collected:  07/30/19 1033    Lab Status:  Final result Specimen:  Blood from Arm, Right Updated:  07/30/19 1117     Phosphorus 3 9 mg/dL     CK Total with Reflex CKMB [[id number]]  (Normal) Collected:  07/30/19 1033    Lab Status:  Final result Specimen:  Blood from Arm, Right Updated:  07/30/19 1116     Total  U/L     Comprehensive metabolic panel [[id number]] Collected:  07/30/19 1033    Lab Status:  Final result Specimen:  Blood from Arm, Right Updated:  07/30/19 1055     Sodium 140 mmol/L      Potassium 4 7 mmol/L      Chloride 103 mmol/L      CO2 31 mmol/L      ANION GAP 6 mmol/L      BUN 12 mg/dL      Creatinine 0 87 mg/dL      Glucose 97 mg/dL      Calcium 9 0 mg/dL      AST 20 U/L      ALT 28 U/L      Alkaline Phosphatase 81 U/L      Total Protein 7 3 g/dL      Albumin 3 6 g/dL      Total Bilirubin 0 40 mg/dL      eGFR 72 ml/min/1 73sq m     Narrative:       National Kidney Disease Foundation guidelines for Chronic Kidney Disease (CKD):     Stage 1 with normal or high GFR (GFR > 90 mL/min/1 73 square meters)    Stage 2 Mild CKD (GFR = 60-89 mL/min/1 73 square meters)    Stage 3A Moderate CKD (GFR = 45-59 mL/min/1 73 square meters)    Stage 3B Moderate CKD (GFR = 30-44 mL/min/1 73 square meters)    Stage 4 Severe CKD (GFR = 15-29 mL/min/1 73 square meters)    Stage 5 End Stage CKD (GFR <15 mL/min/1 73 square meters)  Note: GFR calculation is accurate only with a steady state creatinine    Protime-INR [[id number]]  (Normal) Collected:  07/30/19 1033    Lab Status:  Final result Specimen:  Blood from Arm, Right Updated:  07/30/19 1055     Protime 10 3 seconds      INR 0 98    APTT [[id number]]  (Normal) Collected:  07/30/19 1033    Lab Status:  Final result Specimen:  Blood from Arm, Right Updated:  07/30/19 1055     PTT 25 seconds     CBC and differential [[id number]] Collected:  07/30/19 1033    Lab Status:  Final result Specimen:  Blood from Arm, Right Updated:  07/30/19 1040     WBC 6 62 Thousand/uL      RBC 5 00 Million/uL      Hemoglobin 13 7 g/dL      Hematocrit 42 9 %      MCV 86 fL      MCH 27 4 pg      MCHC 31 9 g/dL      RDW 13 5 %      MPV 10 5 fL      Platelets 402 Thousands/uL      nRBC 0 /100 WBCs      Neutrophils Relative 65 %      Immat GRANS % 0 %      Lymphocytes Relative 23 %      Monocytes Relative 9 %      Eosinophils Relative 2 %      Basophils Relative 1 %      Neutrophils Absolute 4 32 Thousands/µL      Immature Grans Absolute 0 02 Thousand/uL      Lymphocytes Absolute 1 49 Thousands/µL      Monocytes Absolute 0 62 Thousand/µL      Eosinophils Absolute 0 13 Thousand/µL      Basophils Absolute 0 04 Thousands/µL                  VAS lower limb venous duplex study, complete bilateral    (Results Pending)              Procedures  Procedures       ED Course  ED Course as of Jul 30 1744 Tue Jul 30, 2019   1232 Patient still having leg cramping after valium, will check vascular study      1310 Vascular at bedside      1416 Vascular study negative      1416 Patient's  will be getting her around 3:30pm when he gets off work, she has scheduled MRI at 4pm for neurology  Lab work unremarkable, vascular study negative                                  MDM  Number of Diagnoses or Management Options  Leg cramping:   Diagnosis management comments: Patient states flexeril has helped in the past with leg cramping, will give short course to trial  Discussed side effects of flexeril including but not limited to drowsiness - do not drive or operate heavy machinery while on this medication  Lab work up otherwise unremarkable, electrolytes wnl, CKMB wnl, vascular study negative   Patient went to get MRI after ER discharge, was moved up earlier - follow up with neurology  Gave patient proper education regarding diagnosis  Answered all questions  Return to ED for any worsening of symptoms otherwise follow up with primary care physician for re-evaluation  Discussed plan with patient who verbalized understanding and agreed to plan         Amount and/or Complexity of Data Reviewed  Clinical lab tests: reviewed and ordered  Tests in the radiology section of CPT®: ordered and reviewed  Tests in the medicine section of CPT®: ordered and reviewed  Discussion of test results with the performing providers: yes  Review and summarize past medical records: yes  Discuss the patient with other providers: yes  Independent visualization of images, tracings, or specimens: yes        Disposition  Final diagnoses:   Leg cramping     Time reflects when diagnosis was documented in both MDM as applicable and the Disposition within this note     Time User Action Codes Description Comment    7/30/2019  2:32 PM Denita File Add [R25 2] Leg cramping       ED Disposition     ED Disposition Condition Date/Time Comment    Discharge Stable Tue Jul 30, 2019  2:32 PM Nathalie Waddell discharge to home/self care  Follow-up Information     Follow up With Specialties Details Why Contact Info Additional Information    Willam Alejo MD Internal Medicine Schedule an appointment as soon as possible for a visit in 3 days If symptoms worsen [address] [address] [address] [facility] [address]  [address] Emergency Department Emergency Medicine Go to  As needed [address] [facility] [address]  [phone] [facility] ED, [facility] [place] [address]          Discharge Medication List as of 7/30/2019  2:33 PM      START taking these medications    Details   methocarbamol (ROBAXIN) 500 mg tablet Take 1 tablet (500 mg total) by mouth 2 (two) times a day, Starting Tue 7/30/2019, Print         CONTINUE these medications which have NOT CHANGED    Details   ALPRAZolam (XANAX) 0 5 mg tablet Take by mouth 3 (three) times a day as needed for anxiety, Historical Med      amitriptyline (ELAVIL) 25 mg tablet Take 1 tab at bedtime for one week and if tolerated take 2 tabs at bedtime , Normal      brimonidine (ALPHAGAN P) 0 1 % Historical Med      calcium carbonate 1250 MG capsule Take 1,250 mg by mouth, Historical Med      famotidine (PEPCID) 40 MG tablet Take 40 mg by mouth daily, Historical Med      gabapentin (NEURONTIN) 600 MG tablet Take 600 mg by mouth 2 (two) times a day , Historical Med      magnesium oxide (MAG-OX) 400 mg Take 1 tablet (400 mg total) by mouth daily, Starting Mon 6/24/2019, Normal      acetaminophen (TYLENOL) 325 mg tablet Take 2 tablets (650 mg total) by mouth every 8 (eight) hours as needed for mild pain or headaches, Starting Fri 2/15/2019, Normal      Blood Pressure Monitoring (BLOOD PRESSURE CUFF) MISC Take BP PRN when relaxed  , Print      diclofenac sodium (VOLTAREN) 1 % Apply 2 g topically 4 (four) times a day, Starting Wed 3/6/2019, Normal      Erenumab-aooe (AIMOVIG) 140 MG/ML SOAJ Inject 140 mg under the skin every 30 (thirty) days, Starting Thu 7/11/2019, Normal      omeprazole (PriLOSEC) 40 MG capsule Take 40 mg by mouth daily , Starting Sat 3/24/2018, Historical Med           No discharge procedures on file      ED Provider  Electronically Signed by           Liv Mack PA-C  07/30/19 3200

## 2019-07-30 NOTE — ED NOTES
Pt to have outpatient MRI at 1630  Sts she is a difficult stick for IV  MRI called that pt is being discharged and said that they will use IV and do test early  Pt taken to MRI via [facility] after discharge from ED       Marcia Zarate, RN  07/30/19 Amanda Osuna 005 Nadege Ledezma RN  07/30/19 3113

## 2019-07-31 PROCEDURE — 93970 EXTREMITY STUDY: CPT | Performed by: SURGERY

## 2019-08-05 ENCOUNTER — APPOINTMENT (OUTPATIENT)
Dept: PHYSICAL THERAPY | Facility: CLINIC | Age: 64
End: 2019-08-05
Payer: COMMERCIAL

## 2019-08-08 ENCOUNTER — EVALUATION (OUTPATIENT)
Dept: PHYSICAL THERAPY | Facility: CLINIC | Age: 64
End: 2019-08-08
Payer: COMMERCIAL

## 2019-08-08 ENCOUNTER — OFFICE VISIT (OUTPATIENT)
Dept: NEUROLOGY | Facility: CLINIC | Age: 64
End: 2019-08-08
Payer: COMMERCIAL

## 2019-08-08 VITALS
HEIGHT: 64 IN | WEIGHT: 154 LBS | HEART RATE: 98 BPM | DIASTOLIC BLOOD PRESSURE: 68 MMHG | SYSTOLIC BLOOD PRESSURE: 102 MMHG | BODY MASS INDEX: 26.29 KG/M2

## 2019-08-08 DIAGNOSIS — R25.2 SPASTICITY: ICD-10-CM

## 2019-08-08 DIAGNOSIS — Z98.1 S/P CERVICAL SPINAL FUSION: Primary | ICD-10-CM

## 2019-08-08 DIAGNOSIS — M25.552 LEFT HIP PAIN: Primary | ICD-10-CM

## 2019-08-08 DIAGNOSIS — M76.02 GLUTEAL TENDINITIS OF LEFT BUTTOCK: ICD-10-CM

## 2019-08-08 DIAGNOSIS — R53.1 WEAKNESS: ICD-10-CM

## 2019-08-08 DIAGNOSIS — M54.5 ACUTE LEFT-SIDED LOW BACK PAIN, WITH SCIATICA PRESENCE UNSPECIFIED: ICD-10-CM

## 2019-08-08 PROCEDURE — 99214 OFFICE O/P EST MOD 30 MIN: CPT | Performed by: PSYCHIATRY & NEUROLOGY

## 2019-08-08 PROCEDURE — 97161 PT EVAL LOW COMPLEX 20 MIN: CPT | Performed by: PHYSICAL THERAPIST

## 2019-08-08 RX ORDER — GABAPENTIN 300 MG/1
300 CAPSULE ORAL 3 TIMES DAILY
Refills: 0 | Status: ON HOLD | COMMUNITY
Start: 2019-07-25 | End: 2019-09-10 | Stop reason: SDUPTHER

## 2019-08-08 NOTE — PROGRESS NOTES
PT Evaluation     Today's date: 2019  Patient name: Iliana Linares  : 1955  MRN: [id number]  Referring provider: Johnny Rincon MD  Dx:   Encounter Diagnosis     ICD-10-CM    1  Left hip pain M25 552    2  Acute left-sided low back pain, with sciatica presence unspecified M54 5    3  Gluteal tendinitis of left buttock M76 02        Start Time: 0205  Stop Time: 0240  Total time in clinic (min): 35 minutes    Assessment  Assessment details: Iliana Linares is a 59 y o  female who presents to physical therapy with pain, decreased LE range of motion, decreased LE strength, impaired function, decreased activity tolerance and poor body mechanics  Patient's clinical presentation is consistent with their referring diagnosis of Left hip pain , Acute left-sided low back pain, with sciatica presence unspecified and Gluteal tendinitis of left buttock  The pt presents with functional limitations of ADLs, ambulation, performing household chores and stair negotiation  Pt would benefit from physical therapy services to address these limitations and maximize function  Pt was instructed and educated on home exercise program today and demonstrates understanding  Impairments: abnormal gait, abnormal muscle firing, abnormal muscle tone, abnormal or restricted ROM, abnormal movement, activity intolerance, impaired balance, impaired physical strength, lacks appropriate home exercise program, pain with function, weight-bearing intolerance, poor posture  and poor body mechanics  Understanding of Dx/Px/POC: good   Prognosis: good    Goals  Short term goals  (3 weeks)  1  Patient will have no pain left hip  2   Patient will have full range of motion left hip  3  Patient will report a 50% improvement with activities of daily living     Long term goals - (6 weeks)  1  Patient will be independent with home exercise program  2  Patient will have no gait deviations ambulating on level surfaces     3   Patient will report 80 % improvement with activity of daily living function  4   Patient will negotiate stairs up and down pain free with use of one railing  Plan  Patient would benefit from: PT eval and skilled physical therapy  Planned modality interventions: thermotherapy: hydrocollator packs and cryotherapy  Planned therapy interventions: ADL training, balance/weight bearing training, joint mobilization, manual therapy, massage, neuromuscular re-education, patient education, postural training, strengthening, stretching, functional ROM exercises, therapeutic activities, therapeutic exercise, gait training, home exercise program and abdominal trunk stabilization  Frequency: 2x week  Duration in visits: 12  Duration in weeks: 6  Treatment plan discussed with: patient        Subjective Evaluation    History of Present Illness  Mechanism of injury: She reports pain left hip beginning following the C-S fusion in 2019  She followed up with Dr Andreas Garza  She had an Ultrsound  She had an injection  She has now been referred to PT  Pain  Current pain ratin  At best pain ratin  At worst pain ratin  Location: Lateral left hip and numbness lateral left leg to her ankle  Quality: radiating  Relieving factors: rest  Aggravating factors: sitting    Social Support    Employment status: working ([employer] - mostly seated)  Exercise history: Garden    Patient Goals  Patient goals for therapy: decreased pain  Patient's goals regarding treatment: Sit to stand with no pain  Objective     Palpation   Left   Hypertonic in the adductor longus, gluteus medius, quadratus lumborum and rectus femoris  Tenderness of the adductor longus, gluteus medius, quadratus lumborum and rectus femoris  Tenderness     Left Hip   Tenderness in the greater trochanter       Additional Tenderness Details  She is positive for TTP left ITB    Neurological Testing     Sensation     Hip   Left Hip   Intact: light touch    Right Hip   Intact: light touch    Passive Range of Motion   Left Hip   Flexion: 68 degrees   Abduction: 9 degrees     Right Hip   Flexion: 100 degrees   Abduction: 13 degrees     Strength/Myotome Testing     Left Hip   Planes of Motion   Flexion: 3  Extension: 3  Abduction: 3  Adduction: 3    Right Hip   Planes of Motion   Flexion: 4+  Extension: 4+  Abduction: 4+  Adduction: 4+    Ambulation     Observational Gait   Gait: antalgic            Precautions:  Left ankle fx 2006 , C-S fusion 1/1/19 , TIA 2018 , Melanoma 2011        Specialty Daily Treatment Diary     Manual  8/8/19       Hip PROM        STM HS and quad        Stretch HS and quad        MFR to QL            Exercise Diary  8/8       NuStep        TG squat        Side step        Knee flex        Knee ext        SLR        Heelslitanya        Clamarleni        Glute sets                                                                                                    Modalities 8/8       CP                Visit # 1

## 2019-08-08 NOTE — PROGRESS NOTES
Return NeuroOutpatient Note        Nasra Rueda  [id number]  59 y o   1955       Headache and Neurologic Problem (H/O  ANXIETY)        History obtained from: Patient     HPI/Subjective:    Ms Ludwin Friend is a 58 yo F that returns as follow up  She had initially presented to [facility] in March of 2018 with left sided weakness and facial droop  Her stroke work up was negative  No clear etiology was found  Since then she was found to have severe arthritis of cervical spine at [facility]  She had fusion of C3-5 in Jan 2019  Initially it had helped her paresthesia in hands  She has f/u cervical spine imaging upcoming in a week  Her repeat MRI brain showed chronic microvascular disease  Patient had presented just last month here and had inconsistent exam through the encounter  She says that anxiety or if someone is looking her balance does get off  At last visit she was noted to have marked rigidity in LE  We had referred her for PT  We had repeated MRI brain and it was normal    Her CK, sed rate have been normal      Patient had presented to [facility] in March of 2018 for left sided weakness and had gotten tPA  Her MRI brain was normal      She's on gabapentin 300mg bid which is lower than previous dose 600mg tid  She hasn't had headaches lately  She suffers from a lot of anxiety         Past Medical History:   Diagnosis Date    Anesthesia complication     difficult to wake up    Fibromyalgia     GERD (gastroesophageal reflux disease)     Lazy eye     resolved: 3/27/17    Osteopenia     with joint pain-elevated DEV    Tinnitus     Wears glasses     for reading     Social History     Socioeconomic History    Marital status: /Civil Union     Spouse name: Not on file    Number of children: Not on file    Years of education: Not on file    Highest education level: Not on file   Occupational History    Not on file   Social Needs    Financial resource strain: Not on file    Food insecurity: Worry: Not on file     Inability: Not on file    Transportation needs:     Medical: Not on file     Non-medical: Not on file   Tobacco Use    Smoking status: Never Smoker    Smokeless tobacco: Never Used   Substance and Sexual Activity    Alcohol use: No    Drug use: No    Sexual activity: Not Currently   Lifestyle    Physical activity:     Days per week: Not on file     Minutes per session: Not on file    Stress: Not on file   Relationships    Social connections:     Talks on phone: Not on file     Gets together: Not on file     Attends [employer] service: Not on file     Active member of club or organization: Not on file     Attends meetings of clubs or organizations: Not on file     Relationship status: Not on file    Intimate partner violence:     Fear of current or ex partner: Not on file     Emotionally abused: Not on file     Physically abused: Not on file     Forced sexual activity: Not on file   Other Topics Concern    Not on file   Social History Narrative    Not on file     Family History   Problem Relation Age of Onset    Heart disease Mother     Stroke Mother 58    COPD Mother     Arthritis Mother     Hypertension Father     Kidney disease Brother         kidney transplant    No Known Problems Sister     No Known Problems Maternal Aunt     No Known Problems Maternal Uncle     No Known Problems Paternal Aunt     No Known Problems Paternal Uncle     No Known Problems Maternal Grandmother     No Known Problems Maternal Grandfather     No Known Problems Paternal Grandmother     No Known Problems Paternal Grandfather     ADD / ADHD Neg Hx     Anesthesia problems Neg Hx     Cancer Neg Hx     Clotting disorder Neg Hx     Collagen disease Neg Hx     Diabetes Neg Hx     Dislocations Neg Hx     Learning disabilities Neg Hx     Neurological problems Neg Hx     Osteoporosis Neg Hx     Rheumatologic disease Neg Hx     Scoliosis Neg Hx     Vascular Disease Neg Hx Allergies   Allergen Reactions    Demerol [Meperidine] Lightheadedness     Reaction Date: 11Jan2006;     Hydrocodone     Sulfa Antibiotics Hives     Reaction Date: 11Jan2006;     Toradol [Ketorolac Tromethamine]      Current Outpatient Medications on File Prior to Visit   Medication Sig Dispense Refill    acetaminophen (TYLENOL) 325 mg tablet Take 2 tablets (650 mg total) by mouth every 8 (eight) hours as needed for mild pain or headaches 30 tablet 0    ALPRAZolam (XANAX) 0 5 mg tablet Take by mouth 3 (three) times a day as needed for anxiety      amitriptyline (ELAVIL) 25 mg tablet Take 1 tab at bedtime for one week and if tolerated take 2 tabs at bedtime  (Patient taking differently: Take 50 mg by mouth daily at bedtime ) 60 tablet 2    Blood Pressure Monitoring (BLOOD PRESSURE CUFF) MISC Take BP PRN when relaxed  1 each 0    brimonidine (ALPHAGAN P) 0 1 %       calcium carbonate 1250 MG capsule Take 1,250 mg by mouth      diclofenac sodium (VOLTAREN) 1 % Apply 2 g topically 4 (four) times a day 1 Tube 1    famotidine (PEPCID) 40 MG tablet Take 40 mg by mouth daily      gabapentin (NEURONTIN) 300 mg capsule Take 300 mg by mouth 2 (two) times a day  0    magnesium oxide (MAG-OX) 400 mg Take 1 tablet (400 mg total) by mouth daily 30 tablet 3    methocarbamol (ROBAXIN) 500 mg tablet Take 1 tablet (500 mg total) by mouth 2 (two) times a day 20 tablet 0    [DISCONTINUED] Erenumab-aooe (AIMOVIG) 140 MG/ML SOAJ Inject 140 mg under the skin every 30 (thirty) days 1 pen 2    [DISCONTINUED] gabapentin (NEURONTIN) 600 MG tablet Take 600 mg by mouth 2 (two) times a day       [DISCONTINUED] omeprazole (PriLOSEC) 40 MG capsule Take 40 mg by mouth daily   0     No current facility-administered medications on file prior to visit  Review of Systems   Refer to positive review of systems in HPI  Review of Systems   Neurological: Positive for tremors and headaches     All other systems reviewed and are negative  Vitals:    08/08/19 1558   BP: 102/68   BP Location: Left arm   Patient Position: Sitting   Cuff Size: Adult   Pulse: 98   Weight: 69 9 kg (154 lb)   Height: 5' 4" (1 626 m)       PHYSICAL EXAM:  Appearance: No Acute Distress  Ophthalmoscopic: Disc Flat, Normal fundus  Mental status:  Orientation: Awake, Alert, and Orientedx3  Memory: Registation 3/3 Recall 3/3  Attention: normal  Knowledge: good  Language: No aphasia  Speech: No dysarthria  Cranial Nerves:  2 No Visual Defect on Confrontation, Pupils round, equal, reactive to light  3,4,6 Extraocular Movements Intact, no nystagmus; strabismus at baseline   5 Facial Sensation Intact  7 No facial asymmetry  8 Intact hearing  9,10 Palate symmetric, normal gag  11 Good shoulder shrug  12 Tongue Midline  Gait: Stable  Coordination: No ataxia with finger to nose testing, and heel to shin  Sensory: Intact, Symmetric to pinprick, light touch, vibration, and joint position  Muscle Tone: rigidity in right LE, normal today in LLE  Muscle exam:  Arm Right Left Leg Right Left   Deltoid 5/5 5/5 Iliopsoas 5/5 5/5   Biceps 5/5 5/5 Quads 5/5 5/5   Triceps 5/5 5/5 Hamstrings 5/5 5/5   Wrist Extension 5/5 5/5 Ankle Dorsi Flexion 5/5 5/5   Wrist Flexion 5/5 5/5 Ankle Plantar Flexion 5/5 5/5   Interossei 5/5 5/5 Ankle Eversion 5/5 5/5   APB 5/5 5/5 Ankle Inversion 5/5 5/5       Reflexes   RJ BJ TJ KJ AJ Plantars Cantrell's   Right 2+ 2+ 2+ 2+ 1+ Downgoing Not present   Left 2+ 2+ 2+ 2+ 1+ Downgoing Not present     Personal review of  Labs:                  Diagnoses and all orders for this visit:        1  S/P cervical spinal fusion     2  Spasticity  MRI thoracic spine with and without contrast    MRI lumbar spine without contrast   3  Weakness  Glutamic acid decarboxylase    Paraneoplastic Profile II    MRI thoracic spine with and without contrast    MRI lumbar spine without contrast       Patient is doing better in terms of headaches     She still has rigidity in RLE  We have no found any etiology  Will get one time MRI thoracic and LS spine to exclude any spine pathology  Her sxs do not quite make sense  If normal, will consider her sxs to be psychological    Hereditary spastic paraplegia can be in differential but it doesn't develop this quickly and it would have consistent exam    Will order anti kita and paraneoplastic panel  Will resume magnesium to her regimen     Cont PT/OT            Total time of encounter:  30 min  More than 50% of the time was used in counseling and/or coordination of care  Extent of counseling and/or coordination of care        MD [facility]  [address]  [address]  [phone]

## 2019-08-08 NOTE — LETTER
2019    Elisabeth Dillard MD  [address] [facility] [address]  [address]    Patient: Brandon Edwards   YOB: 1955   Date of Visit: 2019     Encounter Diagnosis     ICD-10-CM    1  Left hip pain M25 552    2  Acute left-sided low back pain, with sciatica presence unspecified M54 5    3  Gluteal tendinitis of left buttock M76 02        Dear Dr Barreto Stagers: Thank you for your recent referral of Brandon Edwards  Please review the attached evaluation summary from Ina's recent visit  Please verify that you agree with the plan of care by signing the attached order  If you have any questions or concerns, please do not hesitate to call  I sincerely appreciate the opportunity to share in the care of one of your patients and hope to have another opportunity to work with you in the near future  Sincerely,    Linda De La Cruz, PT      Referring Provider:      I certify that I have read the below Plan of Care and certify the need for these services furnished under this plan of treatment while under my care  Elisabeth Dillard MD  [address] [facility] [address]  [address]: [phone]          PT Evaluation     Today's date: 2019  Patient name: Brandon Edwards  : 1955  MRN: [id number]  Referring provider: Sukhjinder Wu MD  Dx:   Encounter Diagnosis     ICD-10-CM    1  Left hip pain M25 552    2  Acute left-sided low back pain, with sciatica presence unspecified M54 5    3  Gluteal tendinitis of left buttock M76 02        Start Time: 0205  Stop Time: 0240  Total time in clinic (min): 35 minutes    Assessment  Assessment details: Brandon Edwards is a 59 y o  female who presents to physical therapy with pain, decreased LE range of motion, decreased LE strength, impaired function, decreased activity tolerance and poor body mechanics   Patient's clinical presentation is consistent with their referring diagnosis of Left hip pain , Acute left-sided low back pain, with sciatica presence unspecified and Gluteal tendinitis of left buttock  The pt presents with functional limitations of ADLs, ambulation, performing household chores and stair negotiation  Pt would benefit from physical therapy services to address these limitations and maximize function  Pt was instructed and educated on home exercise program today and demonstrates understanding  Impairments: abnormal gait, abnormal muscle firing, abnormal muscle tone, abnormal or restricted ROM, abnormal movement, activity intolerance, impaired balance, impaired physical strength, lacks appropriate home exercise program, pain with function, weight-bearing intolerance, poor posture  and poor body mechanics  Understanding of Dx/Px/POC: good   Prognosis: good    Goals  Short term goals  (3 weeks)  1  Patient will have no pain left hip  2   Patient will have full range of motion left hip  3  Patient will report a 50% improvement with activities of daily living     Long term goals - (6 weeks)  1  Patient will be independent with home exercise program  2  Patient will have no gait deviations ambulating on level surfaces  3   Patient will report 80 % improvement with activity of daily living function  4   Patient will negotiate stairs up and down pain free with use of one railing         Plan  Patient would benefit from: PT eval and skilled physical therapy  Planned modality interventions: thermotherapy: hydrocollator packs and cryotherapy  Planned therapy interventions: ADL training, balance/weight bearing training, joint mobilization, manual therapy, massage, neuromuscular re-education, patient education, postural training, strengthening, stretching, functional ROM exercises, therapeutic activities, therapeutic exercise, gait training, home exercise program and abdominal trunk stabilization  Frequency: 2x week  Duration in visits: 12  Duration in weeks: 6  Treatment plan discussed with: patient        Subjective Evaluation    History of Present Illness  Mechanism of injury: She reports pain left hip beginning following the C-S fusion in 2019  She followed up with Dr Valadez Gift  She had an Ultrsound  She had an injection  She has now been referred to PT  Pain  Current pain ratin  At best pain ratin  At worst pain ratin  Location: Lateral left hip and numbness lateral left leg to her ankle  Quality: radiating  Relieving factors: rest  Aggravating factors: sitting    Social Support    Employment status: working (P/T [employer] - mostly seated)  Exercise history: Garden    Patient Goals  Patient goals for therapy: decreased pain  Patient's goals regarding treatment: Sit to stand with no pain  Objective     Palpation   Left   Hypertonic in the adductor longus, gluteus medius, quadratus lumborum and rectus femoris  Tenderness of the adductor longus, gluteus medius, quadratus lumborum and rectus femoris  Tenderness     Left Hip   Tenderness in the greater trochanter       Additional Tenderness Details  She is positive for TTP left ITB    Neurological Testing     Sensation     Hip   Left Hip   Intact: light touch    Right Hip   Intact: light touch    Passive Range of Motion   Left Hip   Flexion: 68 degrees   Abduction: 9 degrees     Right Hip   Flexion: 100 degrees   Abduction: 13 degrees     Strength/Myotome Testing     Left Hip   Planes of Motion   Flexion: 3  Extension: 3  Abduction: 3  Adduction: 3    Right Hip   Planes of Motion   Flexion: 4+  Extension: 4+  Abduction: 4+  Adduction: 4+    Ambulation     Observational Gait   Gait: antalgic            Precautions:  Left ankle fx  , C-S fusion 19 , TIA 2018 , Melanoma 2011        Specialty Daily Treatment Diary     Manual  19       Hip PROM        STM HS and quad        Stretch HS and quad        MFR to QL            Exercise Diary         NuStep        TG squat        Side step        Knee flex        Knee ext SLR        Heelslitanya Cota        Glute sets                                                                                                    Modalities 8/8       CP                Visit # 1

## 2019-08-13 ENCOUNTER — TRANSCRIBE ORDERS (OUTPATIENT)
Dept: PHYSICAL THERAPY | Facility: CLINIC | Age: 64
End: 2019-08-13

## 2019-08-13 DIAGNOSIS — M54.5 ACUTE LEFT-SIDED LOW BACK PAIN, WITH SCIATICA PRESENCE UNSPECIFIED: ICD-10-CM

## 2019-08-13 DIAGNOSIS — M76.02 GLUTEAL TENDONITIS OF LEFT BUTTOCK: ICD-10-CM

## 2019-08-13 DIAGNOSIS — M25.552 LEFT HIP PAIN: Primary | ICD-10-CM

## 2019-08-14 ENCOUNTER — APPOINTMENT (OUTPATIENT)
Dept: PHYSICAL THERAPY | Facility: CLINIC | Age: 64
End: 2019-08-14
Payer: COMMERCIAL

## 2019-08-16 ENCOUNTER — OFFICE VISIT (OUTPATIENT)
Dept: PHYSICAL THERAPY | Facility: CLINIC | Age: 64
End: 2019-08-16
Payer: COMMERCIAL

## 2019-08-16 DIAGNOSIS — M54.5 ACUTE LEFT-SIDED LOW BACK PAIN, WITH SCIATICA PRESENCE UNSPECIFIED: ICD-10-CM

## 2019-08-16 DIAGNOSIS — M25.552 LEFT HIP PAIN: Primary | ICD-10-CM

## 2019-08-16 PROCEDURE — 97140 MANUAL THERAPY 1/> REGIONS: CPT

## 2019-08-16 PROCEDURE — 97110 THERAPEUTIC EXERCISES: CPT

## 2019-08-16 NOTE — PROGRESS NOTES
Daily Note     Today's date: 2019  Patient name: Nasra Rueda  : 1955  MRN: [id number]  Referring provider: Jay Corona  Dx:   Encounter Diagnosis     ICD-10-CM    1  Left hip pain M25 552    2  Acute left-sided low back pain, with sciatica presence unspecified M54 5                   Subjective:  Reports 3-4/10 L hip pain today      Objective: See treatment diary below    Precautions:  Left ankle fx  , C-S fusion 19 , TIA  , Melanoma         Specialty Daily Treatment Diary     Manual  19      Hip PROM  2 min      STM HS and quad  5 min      Stretch HS and quad  3 min      MFR to QL            Exercise Diary        NuStep  10 min      TG squat  Lev 15 X 12      Side step  12 ft x 4      Knee flex  20      Knee ext  20      SLR  10 AA      Heelslides  20      Clamshells        Glute sets  20 x 5 sec                                                                                                  Modalities       CP  deferred              Visit # 1 2            Assessment: Tolerated treatment   Patient displayed antalgic gait  Tended to internally rotate at hip while on nustep  Shows much L LE / Cynthia Morgan  Could not perform Clamshells secondary to weakness      Plan: Continue per plan of care   ashley

## 2019-08-19 ENCOUNTER — OFFICE VISIT (OUTPATIENT)
Dept: PHYSICAL THERAPY | Facility: CLINIC | Age: 64
End: 2019-08-19
Payer: COMMERCIAL

## 2019-08-19 DIAGNOSIS — M76.02 GLUTEAL TENDINITIS OF LEFT BUTTOCK: ICD-10-CM

## 2019-08-19 DIAGNOSIS — M25.552 LEFT HIP PAIN: Primary | ICD-10-CM

## 2019-08-19 DIAGNOSIS — M54.5 ACUTE LEFT-SIDED LOW BACK PAIN, WITH SCIATICA PRESENCE UNSPECIFIED: ICD-10-CM

## 2019-08-19 PROCEDURE — 97110 THERAPEUTIC EXERCISES: CPT

## 2019-08-19 PROCEDURE — 97140 MANUAL THERAPY 1/> REGIONS: CPT

## 2019-08-19 NOTE — PROGRESS NOTES
Daily Note     Today's date: 2019  Patient name: Octavio Mcduffie  : 1955  MRN: [id number]  Referring provider: Ollie Burton  Dx:   Encounter Diagnosis     ICD-10-CM    1  Left hip pain M25 552    2  Acute left-sided low back pain, with sciatica presence unspecified M54 5    3  Gluteal tendinitis of left buttock M76 02                   Subjective:  Reports 3-4/10 L hip pain today      Objective: See treatment diary below    Precautions:  Left ankle fx  , C-S fusion 19 , TIA  , Melanoma         Specialty Daily Treatment Diary     Manual  19     Hip PROM  2 min 2 min     STM HS and quad  5 min 5 min     Stretch HS and quad  3 min 3 min     MFR to QL            Exercise Diary       NuStep  10 min 10 min     TG squat  Lev 15 X 12 Lev 15 x 20     Side step  12 ft x 4 12 ft x 6     Knee flex  20 20     Knee ext  20 20     SLR  10 AA 2 x 10 AA     Heelslides  20 20     Clamshells   20 active/ AA     Glute sets  20 x 5 sec 20 x 5 sec     Standing hip ABD   20                                                                                         Modalities      CP  deferred =========             Visit # 1 2 3           Assessment: Tolerated treatment well   Patient displayed antalgic gait     Shows much L LE / Cynthia Wilton  Showed improved SLR today  Required some AA during concentric stage of Clams    Plan: Continue per plan of care

## 2019-08-20 ENCOUNTER — APPOINTMENT (OUTPATIENT)
Dept: PHYSICAL THERAPY | Facility: CLINIC | Age: 64
End: 2019-08-20
Payer: COMMERCIAL

## 2019-08-22 ENCOUNTER — APPOINTMENT (OUTPATIENT)
Dept: PHYSICAL THERAPY | Facility: CLINIC | Age: 64
End: 2019-08-22
Payer: COMMERCIAL

## 2019-08-22 ENCOUNTER — OFFICE VISIT (OUTPATIENT)
Dept: PHYSICAL THERAPY | Facility: CLINIC | Age: 64
End: 2019-08-22
Payer: COMMERCIAL

## 2019-08-22 DIAGNOSIS — M76.02 GLUTEAL TENDINITIS OF LEFT BUTTOCK: ICD-10-CM

## 2019-08-22 DIAGNOSIS — M25.552 LEFT HIP PAIN: Primary | ICD-10-CM

## 2019-08-22 DIAGNOSIS — M54.5 ACUTE LEFT-SIDED LOW BACK PAIN, WITH SCIATICA PRESENCE UNSPECIFIED: ICD-10-CM

## 2019-08-22 PROCEDURE — 97110 THERAPEUTIC EXERCISES: CPT | Performed by: PHYSICAL THERAPIST

## 2019-08-22 NOTE — PROGRESS NOTES
Daily Note     Today's date: 2019  Patient name: Gil Richardson  : 1955  MRN: [id number]  Referring provider: Brittany Bustos  Dx:   Encounter Diagnosis     ICD-10-CM    1  Left hip pain M25 552    2  Acute left-sided low back pain, with sciatica presence unspecified M54 5    3  Gluteal tendinitis of left buttock M76 02                   Subjective:  Reports 7/10 bilateral leg pain today  Objective: See treatment diary below    Precautions:  Left ankle fx  , C-S fusion 19 , TIA  , Melanoma         Specialty Daily Treatment Diary     Manual  19    Hip PROM  2 min 2 min 2 min    STM HS and quad  5 min 5 min 5 min    Stretch HS and quad  3 min 3 min 3 min    MFR to QL            Exercise Diary      NuStep  10 min 10 min 10 min    TG squat  Lev 15 X 12 Lev 15 x 20     Side step  12 ft x 4 12 ft x 6     Knee flex  20 20 20    Knee ext  20 20 20    SLR  10 AA 2 x 10 AA 20    Heelslides  20 20 20    Clamshells   20 active/ AA 20    Glute sets  20 x 5 sec 20 x 5 sec 20    Standing hip ABD   20                                                                                         Modalities 19    CP  deferred ========= deferred            Visit # 1 2 3 4          Assessment: Held weight bearing exercises today due to patient's complaint of increase pain bilateral legs  Plan: Continue per plan of care

## 2019-08-23 RX ORDER — DULOXETIN HYDROCHLORIDE 30 MG/1
30 CAPSULE, DELAYED RELEASE ORAL DAILY
Refills: 0 | Status: ON HOLD | COMMUNITY
Start: 2019-08-20 | End: 2019-09-10 | Stop reason: SDUPTHER

## 2019-08-23 RX ORDER — ESOMEPRAZOLE MAGNESIUM 40 MG/1
40 CAPSULE, DELAYED RELEASE ORAL DAILY
Refills: 0 | COMMUNITY
Start: 2019-08-13 | End: 2019-09-20 | Stop reason: HOSPADM

## 2019-08-23 RX ORDER — LEVOTHYROXINE SODIUM 0.03 MG/1
25 TABLET ORAL DAILY
Refills: 0 | COMMUNITY
Start: 2019-08-20 | End: 2019-09-20 | Stop reason: HOSPADM

## 2019-08-24 LAB
GAD65 AB SER-ACNC: <5 U/ML (ref 0–5)
HU1 AB TITR SER: NORMAL TITER
HU2 AB TITR SER IF: NORMAL TITER

## 2019-08-26 ENCOUNTER — APPOINTMENT (OUTPATIENT)
Dept: PHYSICAL THERAPY | Facility: CLINIC | Age: 64
End: 2019-08-26
Payer: COMMERCIAL

## 2019-08-27 ENCOUNTER — HOSPITAL ENCOUNTER (EMERGENCY)
Facility: HOSPITAL | Age: 64
Discharge: HOME | End: 2019-08-27
Attending: EMERGENCY MEDICINE | Admitting: EMERGENCY MEDICINE
Payer: COMMERCIAL

## 2019-08-27 ENCOUNTER — OFFICE VISIT (OUTPATIENT)
Dept: ENDOCRINOLOGY | Facility: CLINIC | Age: 64
End: 2019-08-27
Payer: COMMERCIAL

## 2019-08-27 VITALS
DIASTOLIC BLOOD PRESSURE: 82 MMHG | SYSTOLIC BLOOD PRESSURE: 110 MMHG | BODY MASS INDEX: 27.22 KG/M2 | HEART RATE: 86 BPM | WEIGHT: 158.6 LBS

## 2019-08-27 VITALS
BODY MASS INDEX: 26.63 KG/M2 | SYSTOLIC BLOOD PRESSURE: 128 MMHG | WEIGHT: 156 LBS | TEMPERATURE: 97.7 F | OXYGEN SATURATION: 99 % | DIASTOLIC BLOOD PRESSURE: 73 MMHG | HEIGHT: 64 IN | RESPIRATION RATE: 16 BRPM | HEART RATE: 84 BPM

## 2019-08-27 DIAGNOSIS — R53.1 WEAKNESS: Primary | ICD-10-CM

## 2019-08-27 DIAGNOSIS — R79.89 ABNORMAL THYROID BLOOD TEST: Primary | ICD-10-CM

## 2019-08-27 DIAGNOSIS — E03.9 ACQUIRED HYPOTHYROIDISM: ICD-10-CM

## 2019-08-27 LAB
ANION GAP SERPL CALCULATED.3IONS-SCNC: 9 MMOL/L (ref 4–13)
BACTERIA UR QL AUTO: ABNORMAL /HPF
BASOPHILS # BLD AUTO: 0.04 THOUSANDS/ΜL (ref 0–0.1)
BASOPHILS NFR BLD AUTO: 1 % (ref 0–1)
BILIRUB UR QL STRIP: NEGATIVE
BUN SERPL-MCNC: 17 MG/DL (ref 5–25)
CALCIUM SERPL-MCNC: 9.4 MG/DL (ref 8.3–10.1)
CHLORIDE SERPL-SCNC: 103 MMOL/L (ref 100–108)
CK SERPL-CCNC: 80 U/L (ref 26–192)
CLARITY UR: CLEAR
CO2 SERPL-SCNC: 29 MMOL/L (ref 21–32)
COLOR UR: ABNORMAL
CREAT SERPL-MCNC: 0.93 MG/DL (ref 0.6–1.3)
EOSINOPHIL # BLD AUTO: 0.2 THOUSAND/ΜL (ref 0–0.61)
EOSINOPHIL NFR BLD AUTO: 3 % (ref 0–6)
ERYTHROCYTE [DISTWIDTH] IN BLOOD BY AUTOMATED COUNT: 13.4 % (ref 11.6–15.1)
GFR SERPL CREATININE-BSD FRML MDRD: 66 ML/MIN/1.73SQ M
GLUCOSE SERPL-MCNC: 95 MG/DL (ref 65–140)
GLUCOSE UR STRIP-MCNC: NEGATIVE MG/DL
HCT VFR BLD AUTO: 44.4 % (ref 34.8–46.1)
HGB BLD-MCNC: 14 G/DL (ref 11.5–15.4)
HGB UR QL STRIP.AUTO: NEGATIVE
IMM GRANULOCYTES # BLD AUTO: 0.01 THOUSAND/UL (ref 0–0.2)
IMM GRANULOCYTES NFR BLD AUTO: 0 % (ref 0–2)
KETONES UR STRIP-MCNC: NEGATIVE MG/DL
LEUKOCYTE ESTERASE UR QL STRIP: ABNORMAL
LYMPHOCYTES # BLD AUTO: 1.31 THOUSANDS/ΜL (ref 0.6–4.47)
LYMPHOCYTES NFR BLD AUTO: 22 % (ref 14–44)
MAGNESIUM SERPL-MCNC: 2.3 MG/DL (ref 1.6–2.6)
MCH RBC QN AUTO: 27.3 PG (ref 26.8–34.3)
MCHC RBC AUTO-ENTMCNC: 31.5 G/DL (ref 31.4–37.4)
MCV RBC AUTO: 87 FL (ref 82–98)
MONOCYTES # BLD AUTO: 0.48 THOUSAND/ΜL (ref 0.17–1.22)
MONOCYTES NFR BLD AUTO: 8 % (ref 4–12)
NEUTROPHILS # BLD AUTO: 3.85 THOUSANDS/ΜL (ref 1.85–7.62)
NEUTS SEG NFR BLD AUTO: 66 % (ref 43–75)
NITRITE UR QL STRIP: NEGATIVE
NON-SQ EPI CELLS URNS QL MICRO: ABNORMAL /HPF
NRBC BLD AUTO-RTO: 0 /100 WBCS
PH UR STRIP.AUTO: 7.5 [PH]
PLATELET # BLD AUTO: 235 THOUSANDS/UL (ref 149–390)
PMV BLD AUTO: 9.9 FL (ref 8.9–12.7)
POTASSIUM SERPL-SCNC: 4.5 MMOL/L (ref 3.5–5.3)
PROT UR STRIP-MCNC: NEGATIVE MG/DL
RBC # BLD AUTO: 5.12 MILLION/UL (ref 3.81–5.12)
RBC #/AREA URNS AUTO: ABNORMAL /HPF
SODIUM SERPL-SCNC: 141 MMOL/L (ref 136–145)
SP GR UR STRIP.AUTO: <=1.005 (ref 1–1.03)
UROBILINOGEN UR QL STRIP.AUTO: 0.2 E.U./DL
WBC # BLD AUTO: 5.89 THOUSAND/UL (ref 4.31–10.16)
WBC #/AREA URNS AUTO: ABNORMAL /HPF

## 2019-08-27 PROCEDURE — 36415 COLL VENOUS BLD VENIPUNCTURE: CPT | Performed by: EMERGENCY MEDICINE

## 2019-08-27 PROCEDURE — 99285 EMERGENCY DEPT VISIT HI MDM: CPT

## 2019-08-27 PROCEDURE — 85025 COMPLETE CBC W/AUTO DIFF WBC: CPT | Performed by: EMERGENCY MEDICINE

## 2019-08-27 PROCEDURE — 80048 BASIC METABOLIC PNL TOTAL CA: CPT | Performed by: EMERGENCY MEDICINE

## 2019-08-27 PROCEDURE — 99204 OFFICE O/P NEW MOD 45 MIN: CPT | Performed by: INTERNAL MEDICINE

## 2019-08-27 PROCEDURE — 81001 URINALYSIS AUTO W/SCOPE: CPT | Performed by: EMERGENCY MEDICINE

## 2019-08-27 PROCEDURE — 82550 ASSAY OF CK (CPK): CPT | Performed by: EMERGENCY MEDICINE

## 2019-08-27 PROCEDURE — 83735 ASSAY OF MAGNESIUM: CPT | Performed by: EMERGENCY MEDICINE

## 2019-08-27 PROCEDURE — 96360 HYDRATION IV INFUSION INIT: CPT

## 2019-08-27 RX ADMIN — SODIUM CHLORIDE 1000 ML: 0.9 INJECTION, SOLUTION INTRAVENOUS at 09:17

## 2019-08-27 NOTE — PROGRESS NOTES
ENDOCRINOLOGY  NEW PATIENT H&P     ? Reason for Endocrine Consult/Chief Complaint: thyroid disorder         Medical Decision Making:     Impression  1  Acquired hypothyroidism- started replacement last week  2  Hx of prediabetes  3  HLD  4  Osteopenia  5  Fibromyalgia      Recommendations:    Her TSH as mildly elevated at 5 050 as per her [facility] labs she showed me  There was no FT4 checked but if I had to guess it would have been low normal at that time  Given her LDL elevation and symptoms she was appropriately started on levothyroxine 25mcg/day for replacement  I reviewed the pathophysiology of hypothyroidism and explained how to interpret thyroid labs  I explained that we will check her TSH, FT4 to make sure is now euthyroid in 2 weeks  I also explained we will check TPO Ab to confirm Hashimoto's thyroiditis as the cause for her hypothyroidism  During the clinic visit she was experiencing overwhelming fatigue and was occasionally tearful and I explained that given her mild hypothyroidism I wouldn't expect such symptoms to be a result of the thyroid disorder she has  I instructed her to go the ER today after my appointment to get a more extensive evaluation of her symptoms  She agreed to go to the ER at [facility] in 2 months     Hx of prediabetes- consider metformin for prevention of progression    HLD- LDL should mildly improve with levothyroxine replacement    Osteopenia- due for repeat DEXA 2019    Fantasma CASTELLANO  History of Present Illness:  Mrs Nathaniel Jenkins is a [place] Mil[place] old female who presents for thyroid disorder  She states that she was diagnosed with hypothyroidism last week  Started on levothyroxine late last week 25mcg/day  Experiencing fatigue and with depressive symptoms  Some constipation, no dry skin  Has lost some eyebrow hair  Some hair loss  No hx of neck radiation  Had [facility] blood work last week  Not in our system       PMH-osteopenia, fibromyalgia, GERD, prediabetes, HLD  PSH-appendectomy, cholecystectomy, cervical fusion, wisdom tooth, right oophorectomy  FHx-sisters on thyroid medication   SHx-negative ETOH, retired but works           ? Review of Systems:     Review of Systems   Constitutional: Positive for activity change and fatigue  HENT: Negative  Eyes: Negative  Respiratory: Negative  Cardiovascular: Negative  Gastrointestinal: Negative  Endocrine: Negative  Genitourinary: Negative  Musculoskeletal: Positive for gait problem  Skin: Negative  Allergic/Immunologic: Negative  Neurological: Positive for weakness  Hematological: Negative  Psychiatric/Behavioral: Positive for sleep disturbance  ?   Patient History:     Past Medical History:   Diagnosis Date    Anesthesia complication     difficult to wake up    Fibromyalgia     GERD (gastroesophageal reflux disease)     Lazy eye     resolved: 3/27/17    Osteopenia     with joint pain-elevated DEV    Tinnitus     Wears glasses     for reading     Past Surgical History:   Procedure Laterality Date    ABDOMINAL SURGERY      lysis of adhesions x 2    APPENDECTOMY      CERVICAL FUSION      CHOLECYSTECTOMY      open    DILATION AND CURETTAGE OF UTERUS      NEUROMA EXCISION Right 5/26/2017    Procedure: EXCISION MASS / FIBROMA FOOT;  Surgeon: Esme Pat DPM;  Location: [facility];  Service:     RIGHT OOPHORECTOMY      WISDOM TOOTH EXTRACTION      x4     Social History     Socioeconomic History    Marital status: /Civil Union     Spouse name: Not on file    Number of children: Not on file    Years of education: Not on file    Highest education level: Not on file   Occupational History    Not on file   Social Needs    Financial resource strain: Not on file    Food insecurity:     Worry: Not on file     Inability: Not on file    Transportation needs:     Medical: Not on file     Non-medical: Not on file   Tobacco Use    Smoking status: Never Smoker  Smokeless tobacco: Never Used   Substance and Sexual Activity    Alcohol use: No    Drug use: No    Sexual activity: Not Currently   Lifestyle    Physical activity:     Days per week: Not on file     Minutes per session: Not on file    Stress: Not on file   Relationships    Social connections:     Talks on phone: Not on file     Gets together: Not on file     Attends Orthodox service: Not on file     Active member of club or organization: Not on file     Attends meetings of clubs or organizations: Not on file     Relationship status: Not on file    Intimate partner violence:     Fear of current or ex partner: Not on file     Emotionally abused: Not on file     Physically abused: Not on file     Forced sexual activity: Not on file   Other Topics Concern    Not on file   Social History Narrative    Not on file     Family History   Problem Relation Age of Onset    Heart disease Mother     Stroke Mother 58    COPD Mother     Arthritis Mother     Hypertension Father     Kidney disease Brother         kidney transplant    No Known Problems Sister     No Known Problems Maternal Aunt     No Known Problems Maternal Uncle     No Known Problems Paternal Aunt     No Known Problems Paternal Uncle     No Known Problems Maternal Grandmother     No Known Problems Maternal Grandfather     No Known Problems Paternal Grandmother     No Known Problems Paternal Grandfather     ADD / ADHD Neg Hx     Anesthesia problems Neg Hx     Cancer Neg Hx     Clotting disorder Neg Hx     Collagen disease Neg Hx     Diabetes Neg Hx     Dislocations Neg Hx     Learning disabilities Neg Hx     Neurological problems Neg Hx     Osteoporosis Neg Hx     Rheumatologic disease Neg Hx     Scoliosis Neg Hx     Vascular Disease Neg Hx        Current Medications: At the time this note was written these were the medications the patient was on    Current Outpatient Medications   Medication Sig Dispense Refill    acetaminophen (TYLENOL) 325 mg tablet Take 2 tablets (650 mg total) by mouth every 8 (eight) hours as needed for mild pain or headaches 30 tablet 0    ALPRAZolam (XANAX) 0 5 mg tablet Take by mouth 3 (three) times a day as needed for anxiety      amitriptyline (ELAVIL) 25 mg tablet Take 1 tab at bedtime for one week and if tolerated take 2 tabs at bedtime  (Patient taking differently: Take 50 mg by mouth daily at bedtime ) 60 tablet 2    brimonidine (ALPHAGAN P) 0 1 %       calcium carbonate 1250 MG capsule Take 1,250 mg by mouth      diclofenac sodium (VOLTAREN) 1 % Apply 2 g topically 4 (four) times a day 1 Tube 1    DULoxetine (CYMBALTA) 30 mg delayed release capsule Take 30 mg by mouth daily  0    esomeprazole (NexIUM) 40 MG capsule Take 40 mg by mouth daily  0    famotidine (PEPCID) 40 MG tablet Take 40 mg by mouth daily      gabapentin (NEURONTIN) 300 mg capsule Take 300 mg by mouth 2 (two) times a day  0    levothyroxine 25 mcg tablet Take 25 mcg by mouth daily  0    magnesium oxide (MAG-OX) 400 mg Take 1 tablet (400 mg total) by mouth daily 30 tablet 3    methocarbamol (ROBAXIN) 500 mg tablet Take 1 tablet (500 mg total) by mouth 2 (two) times a day 20 tablet 0     No current facility-administered medications for this visit  Allergies: Demerol [meperidine] and Sulfa antibiotics    Physical Exam:   Vital Signs:   /82   Pulse 86   Wt 71 9 kg (158 lb 9 6 oz)   BMI 27 22 kg/m²     Physical Exam   Constitutional: She is oriented to person, place, and time  She appears well-developed and well-nourished  HENT:   Head: Normocephalic and atraumatic  Nose: Nose normal    Mouth/Throat: Oropharynx is clear and moist    Eyes: Pupils are equal, round, and reactive to light  Conjunctivae and EOM are normal    Neck: Normal range of motion  Neck supple  No thyromegaly present  Cardiovascular: Normal rate, regular rhythm and normal heart sounds  Exam reveals no gallop and no friction rub  No murmur heard  Pulmonary/Chest: Effort normal and breath sounds normal  No stridor  No respiratory distress  She has no wheezes  She has no rales  Abdominal: Soft  Bowel sounds are normal  She exhibits no distension and no mass  There is no tenderness  There is no rebound and no guarding  Musculoskeletal: Normal range of motion  She exhibits no edema  Lymphadenopathy:     She has no cervical adenopathy  Neurological: She is alert and oriented to person, place, and time  Skin: Skin is warm and dry  Psychiatric: Her behavior is normal  Judgment and thought content normal    +occasionally tearful          Labs and Imaging:      She showed me her [facility] labs on her phone- TSH of 5 05  ?

## 2019-08-27 NOTE — LETTER
August 27, 2019     Baldo Martinez MD  [address] [address]    Patient: Carlos Blackwood   YOB: 1955   Date of Visit: 8/27/2019       Dear Dr Анна Zuniga: Thank you for referring Carlos Blackwood to me for evaluation  Below are my notes for this consultation  If you have questions, please do not hesitate to call me  I look forward to following your patient along with you  Sincerely,        Randy Painter MD        CC: No Recipients  Randy Painter MD  8/27/2019  8:15 AM  Incomplete  ENDOCRINOLOGY  NEW PATIENT H&P     ? Reason for Endocrine Consult/Chief Complaint: thyroid disorder         Medical Decision Making:     Impression  1  Acquired hypothyroidism- started replacement last week  2  Hx of prediabetes  3  HLD  4  Osteopenia  5  Fibromyalgia      Recommendations:    Her TSH as mildly elevated at 5 050 as per her [employer] labs she showed me  There was no FT4 checked but if I had to guess it would have been low normal at that time  Given her LDL elevation and symptoms she was appropriately started on levothyroxine 25mcg/day for replacement  I reviewed the pathophysiology of hypothyroidism and explained how to interpret thyroid labs  I explained that we will check her TSH, FT4 to make sure is now euthyroid in 2 weeks  I also explained we will check TPO Ab to confirm Hashimoto's thyroiditis as the cause for her hypothyroidism  During the clinic visit she was experiencing overwhelming fatigue and was occasionally tearful and I explained that given her mild hypothyroidism I wouldn't expect such symptoms to be a result of the thyroid disorder she has  I instructed her to go the ER today after my appointment to get a more extensive evaluation of her symptoms   She agreed to go to the ER at [facility] in 2 months     Hx of prediabetes- consider metformin for prevention of progression    HLD- LDL should mildly improve with levothyroxine replacement    Osteopenia- due for repeat DEXA 2019    Brett CASTELLANO  History of Present Illness:  Mrs Stefani Clement is a [place] old female who presents for thyroid disorder  She states that she was diagnosed with hypothyroidism last week  Started on levothyroxine late last week 25mcg/day  Experiencing fatigue and with depressive symptoms  Some constipation, no dry skin  Has lost some eyebrow hair  Some hair loss  No hx of neck radiation  Had [facility] blood work last week  Not in our system  PMH-osteopenia, fibromyalgia, GERD, prediabetes, HLD  PSH-appendectomy, cholecystectomy, cervical fusion, wisdom tooth, right oophorectomy  FHx-sisters on thyroid medication   SHx-negative ETOH, retired but works           ? Review of Systems:     Review of Systems   Constitutional: Positive for activity change and fatigue  HENT: Negative  Eyes: Negative  Respiratory: Negative  Cardiovascular: Negative  Gastrointestinal: Negative  Endocrine: Negative  Genitourinary: Negative  Musculoskeletal: Positive for gait problem  Skin: Negative  Allergic/Immunologic: Negative  Neurological: Positive for weakness  Hematological: Negative  Psychiatric/Behavioral: Positive for sleep disturbance  ?   Patient History:     Past Medical History:   Diagnosis Date    Anesthesia complication     difficult to wake up    Fibromyalgia     GERD (gastroesophageal reflux disease)     Lazy eye     resolved: 3/27/17    Osteopenia     with joint pain-elevated DEV    Tinnitus     Wears glasses     for reading     Past Surgical History:   Procedure Laterality Date    ABDOMINAL SURGERY      lysis of adhesions x 2    APPENDECTOMY      CERVICAL FUSION      CHOLECYSTECTOMY      open    DILATION AND CURETTAGE OF UTERUS      NEUROMA EXCISION Right 5/26/2017    Procedure: EXCISION MASS / FIBROMA FOOT;  Surgeon: Paulo Patterson DPM;  Location: [address] [place] [address];  Service:    [facility] TOOTH EXTRACTION      x4     Social History     Socioeconomic History    Marital status: /Civil Union     Spouse name: Not on file    Number of children: Not on file    Years of education: Not on file    Highest education level: Not on file   Occupational History    Not on file   Social Needs    Financial resource strain: Not on file    Food insecurity:     Worry: Not on file     Inability: Not on file    Transportation needs:     Medical: Not on file     Non-medical: Not on file   Tobacco Use    Smoking status: Never Smoker    Smokeless tobacco: Never Used   Substance and Sexual Activity    Alcohol use: No    Drug use: No    Sexual activity: Not Currently   Lifestyle    Physical activity:     Days per week: Not on file     Minutes per session: Not on file    Stress: Not on file   Relationships    Social connections:     Talks on phone: Not on file     Gets together: Not on file     Attends Mu-ism service: Not on file     Active member of club or organization: Not on file     Attends meetings of clubs or organizations: Not on file     Relationship status: Not on file    Intimate partner violence:     Fear of current or ex partner: Not on file     Emotionally abused: Not on file     Physically abused: Not on file     Forced sexual activity: Not on file   Other Topics Concern    Not on file   Social History Narrative    Not on file     Family History   Problem Relation Age of Onset    Heart disease Mother     Stroke Mother 58    COPD Mother     Arthritis Mother     Hypertension Father     Kidney disease Brother         kidney transplant    No Known Problems Sister     No Known Problems Maternal Aunt     No Known Problems Maternal Uncle     No Known Problems Paternal Aunt     No Known Problems Paternal Uncle     No Known Problems Maternal Grandmother     No Known Problems Maternal Grandfather     No Known Problems Paternal Grandmother     No Known Problems Paternal Devin Chris ADD / ADHD Neg Hx     Anesthesia problems Neg Hx     Cancer Neg Hx     Clotting disorder Neg Hx     Collagen disease Neg Hx     Diabetes Neg Hx     Dislocations Neg Hx     Learning disabilities Neg Hx     Neurological problems Neg Hx     Osteoporosis Neg Hx     Rheumatologic disease Neg Hx     Scoliosis Neg Hx     Vascular Disease Neg Hx        Current Medications: At the time this note was written these were the medications the patient was on  Current Outpatient Medications   Medication Sig Dispense Refill    acetaminophen (TYLENOL) 325 mg tablet Take 2 tablets (650 mg total) by mouth every 8 (eight) hours as needed for mild pain or headaches 30 tablet 0    ALPRAZolam (XANAX) 0 5 mg tablet Take by mouth 3 (three) times a day as needed for anxiety      amitriptyline (ELAVIL) 25 mg tablet Take 1 tab at bedtime for one week and if tolerated take 2 tabs at bedtime  (Patient taking differently: Take 50 mg by mouth daily at bedtime ) 60 tablet 2    brimonidine (ALPHAGAN P) 0 1 %       calcium carbonate 1250 MG capsule Take 1,250 mg by mouth      diclofenac sodium (VOLTAREN) 1 % Apply 2 g topically 4 (four) times a day 1 Tube 1    DULoxetine (CYMBALTA) 30 mg delayed release capsule Take 30 mg by mouth daily  0    esomeprazole (NexIUM) 40 MG capsule Take 40 mg by mouth daily  0    famotidine (PEPCID) 40 MG tablet Take 40 mg by mouth daily      gabapentin (NEURONTIN) 300 mg capsule Take 300 mg by mouth 2 (two) times a day  0    levothyroxine 25 mcg tablet Take 25 mcg by mouth daily  0    magnesium oxide (MAG-OX) 400 mg Take 1 tablet (400 mg total) by mouth daily 30 tablet 3    methocarbamol (ROBAXIN) 500 mg tablet Take 1 tablet (500 mg total) by mouth 2 (two) times a day 20 tablet 0     No current facility-administered medications for this visit          Allergies: Demerol [meperidine] and Sulfa antibiotics    Physical Exam:   Vital Signs:   /82   Pulse 86   Wt 71 9 kg (158 lb 9 6 oz)   BMI 27 22 kg/m²      Physical Exam   Constitutional: She is oriented to person, place, and time  She appears well-developed and well-nourished  HENT:   Head: Normocephalic and atraumatic  Nose: Nose normal    Mouth/Throat: Oropharynx is clear and moist    Eyes: Pupils are equal, round, and reactive to light  Conjunctivae and EOM are normal    Neck: Normal range of motion  Neck supple  No thyromegaly present  Cardiovascular: Normal rate, regular rhythm and normal heart sounds  Exam reveals no gallop and no friction rub  No murmur heard  Pulmonary/Chest: Effort normal and breath sounds normal  No stridor  No respiratory distress  She has no wheezes  She has no rales  Abdominal: Soft  Bowel sounds are normal  She exhibits no distension and no mass  There is no tenderness  There is no rebound and no guarding  Musculoskeletal: Normal range of motion  She exhibits no edema  Lymphadenopathy:     She has no cervical adenopathy  Neurological: She is alert and oriented to person, place, and time  Skin: Skin is warm and dry  Psychiatric: Her behavior is normal  Judgment and thought content normal    +occasionally tearful          Labs and Imaging:      She showed me her [facility] labs on her phone- TSH of 5 05  ? Will MD Gage  8/27/2019  7:56 AM  Sign at close encounter  ENDOCRINOLOGY  NEW PATIENT H&P     ? Reason for Endocrine Consult/Chief Complaint: thyroid disorder         Medical Decision Making:     Impression        Recommendations:        Yulisa CASTELLANO  History of Present Illness:  Mrs Clinton Orona is a [place] old female who presents for thyroid disorder  She states that she was diagnosed with hypothyroidism last week  Started on levothyroxine late last week  Experiencing fatigue and with depressive symptoms  Some constipation, no dry skin  Has lost some eyebrow hair  Some hair loss  No hx of neck radiation  Had [facility] blood work last week  PMH-osteopenia, fibromyalgia, GERD, prediabetes, HLD  PSH-appendectomy, cholecystectomy, cervical fusion, wisdom tooth, right oophorectomy  FHx-sisters on thyroid medication   SHx-negative ETOH, retired but works           ? Review of Systems:     Review of Systems   ?   Patient History:     Past Medical History:   Diagnosis Date    Anesthesia complication     difficult to wake up    Fibromyalgia     GERD (gastroesophageal reflux disease)     Lazy eye     resolved: 3/27/17    Osteopenia     with joint pain-elevated DEV    Tinnitus     Wears glasses     for reading     Past Surgical History:   Procedure Laterality Date    ABDOMINAL SURGERY      lysis of adhesions x 2    APPENDECTOMY      CERVICAL FUSION      CHOLECYSTECTOMY      open    DILATION AND CURETTAGE OF UTERUS      NEUROMA EXCISION Right 5/26/2017    Procedure: EXCISION MASS / FIBROMA FOOT;  Surgeon: Deann Bloch, DPM;  [facility];  Service:     RIGHT OOPHORECTOMY      WISDOM TOOTH EXTRACTION      x4     Social History     Socioeconomic History    Marital status: /Civil Union     Spouse name: Not on file    Number of children: Not on file    Years of education: Not on file    Highest education level: Not on file   Occupational History    Not on file   Social Needs    Financial resource strain: Not on file    Food insecurity:     Worry: Not on file     Inability: Not on file    Transportation needs:     Medical: Not on file     Non-medical: Not on file   Tobacco Use    Smoking status: Never Smoker    Smokeless tobacco: Never Used   Substance and Sexual Activity    Alcohol use: No    Drug use: No    Sexual activity: Not Currently   Lifestyle    Physical activity:     Days per week: Not on file     Minutes per session: Not on file    Stress: Not on file   Relationships    Social connections:     Talks on phone: Not on file     Gets together: Not on file     Attends Synagogue service: Not on file     Active member of club or organization: Not on file     Attends meetings of clubs or organizations: Not on file     Relationship status: Not on file    Intimate partner violence:     Fear of current or ex partner: Not on file     Emotionally abused: Not on file     Physically abused: Not on file     Forced sexual activity: Not on file   Other Topics Concern    Not on file   Social History Narrative    Not on file     Family History   Problem Relation Age of Onset    Heart disease Mother     Stroke Mother 58    COPD Mother     Arthritis Mother     Hypertension Father     Kidney disease Brother         kidney transplant    No Known Problems Sister     No Known Problems Maternal Aunt     No Known Problems Maternal Uncle     No Known Problems Paternal Aunt     No Known Problems Paternal Uncle     No Known Problems Maternal Grandmother     No Known Problems Maternal Grandfather     No Known Problems Paternal Grandmother     No Known Problems Paternal Grandfather     ADD / ADHD Neg Hx     Anesthesia problems Neg Hx     Cancer Neg Hx     Clotting disorder Neg Hx     Collagen disease Neg Hx     Diabetes Neg Hx     Dislocations Neg Hx     Learning disabilities Neg Hx     Neurological problems Neg Hx     Osteoporosis Neg Hx     Rheumatologic disease Neg Hx     Scoliosis Neg Hx     Vascular Disease Neg Hx        Current Medications: At the time this note was written these were the medications the patient was on  Current Outpatient Medications   Medication Sig Dispense Refill    acetaminophen (TYLENOL) 325 mg tablet Take 2 tablets (650 mg total) by mouth every 8 (eight) hours as needed for mild pain or headaches 30 tablet 0    ALPRAZolam (XANAX) 0 5 mg tablet Take by mouth 3 (three) times a day as needed for anxiety      amitriptyline (ELAVIL) 25 mg tablet Take 1 tab at bedtime for one week and if tolerated take 2 tabs at bedtime   (Patient taking differently: Take 50 mg by mouth daily at bedtime ) 60 tablet 2    brimonidine (ALPHAGAN P) 0 1 %       calcium carbonate 1250 MG capsule Take 1,250 mg by mouth      diclofenac sodium (VOLTAREN) 1 % Apply 2 g topically 4 (four) times a day 1 Tube 1    DULoxetine (CYMBALTA) 30 mg delayed release capsule Take 30 mg by mouth daily  0    esomeprazole (NexIUM) 40 MG capsule Take 40 mg by mouth daily  0    famotidine (PEPCID) 40 MG tablet Take 40 mg by mouth daily      gabapentin (NEURONTIN) 300 mg capsule Take 300 mg by mouth 2 (two) times a day  0    levothyroxine 25 mcg tablet Take 25 mcg by mouth daily  0    magnesium oxide (MAG-OX) 400 mg Take 1 tablet (400 mg total) by mouth daily 30 tablet 3    methocarbamol (ROBAXIN) 500 mg tablet Take 1 tablet (500 mg total) by mouth 2 (two) times a day 20 tablet 0     No current facility-administered medications for this visit  Allergies: Demerol [meperidine] and Sulfa antibiotics    Physical Exam:   Vital Signs:   /82   Pulse 86   Wt 71 9 kg (158 lb 9 6 oz)   BMI 27 22 kg/m²      Physical Exam     10g Monofilament test (large nerve fibers)-  128 hz tuning fork (large nerve fibers)-  Pinprick sensation (small nerve fibers)-   Labs and Imaging:      ?

## 2019-08-27 NOTE — ED NOTES
Pt dressed and ambulated with walker out of ER without assistance, gait steady       Sixto Flores RN  08/27/19 1002

## 2019-08-27 NOTE — ED PROVIDER NOTES
History  Chief Complaint   Patient presents with    Weakness - Generalized     since last thursday  started levothyroxine and cymbalta last week     HPI    80-year-old female that presents today with weakness patient states she was recently placed on Cymbalta  States after she started taking the Cymbalta by her PCP she has been feeling more and more weak  States she can barely get out of bed she has been feeling very sad not suicidal   She called the PCP and who discussed about discontinuing it  States she took her dose today  States she came from Endocrinology office because she was fatigued  Denies any focal neurologic deficits no headaches  Patient denies any chest pain shortness of breath no urinary complaints  59 year female that presents today with weakness  Most likely due to medication will get basic blood work to ensure no other secondary causes    Prior to Admission Medications   Prescriptions Last Dose Informant Patient Reported? Taking? ALPRAZolam (XANAX) 0 5 mg tablet   Yes No   Sig: Take by mouth 3 (three) times a day as needed for anxiety   DULoxetine (CYMBALTA) 30 mg delayed release capsule   Yes No   Sig: Take 30 mg by mouth daily    acetaminophen (TYLENOL) 325 mg tablet   No No   Sig: Take 2 tablets (650 mg total) by mouth every 8 (eight) hours as needed for mild pain or headaches   amitriptyline (ELAVIL) 25 mg tablet  Self No No   Sig: Take 1 tab at bedtime for one week and if tolerated take 2 tabs at bedtime     Patient taking differently: Take 50 mg by mouth daily at bedtime    brimonidine (ALPHAGAN P) 0 1 %   Yes No   Sig: Administer 1 drop to both eyes once    calcium carbonate 1250 MG capsule   Yes No   Sig: Take 1,250 mg by mouth daily    diclofenac sodium (VOLTAREN) 1 %   No No   Sig: Apply 2 g topically 4 (four) times a day   Patient taking differently: Apply 2 g topically as needed    esomeprazole (NexIUM) 40 MG capsule   Yes No   Sig: Take 40 mg by mouth daily   gabapentin (NEURONTIN) 300 mg capsule   Yes No   Sig: Take 300 mg by mouth 3 (three) times a day    levothyroxine 25 mcg tablet   Yes No   Sig: Take 25 mcg by mouth daily   magnesium oxide (MAG-OX) 400 mg Not Taking at Unknown time  No No   Sig: Take 1 tablet (400 mg total) by mouth daily   Patient not taking: Reported on 8/27/2019   methocarbamol (ROBAXIN) 500 mg tablet Not Taking at Unknown time  No No   Sig: Take 1 tablet (500 mg total) by mouth 2 (two) times a day   Patient not taking: Reported on 8/27/2019      Facility-Administered Medications: None       Past Medical History:   Diagnosis Date    Anesthesia complication     difficult to wake up    Fibromyalgia     GERD (gastroesophageal reflux disease)     Lazy eye     resolved: 3/27/17    Osteopenia     with joint pain-elevated DEV    Tinnitus     Wears glasses     for reading       Past Surgical History:   Procedure Laterality Date    ABDOMINAL SURGERY      lysis of adhesions x 2    APPENDECTOMY      CERVICAL FUSION      CHOLECYSTECTOMY      open    DILATION AND CURETTAGE OF UTERUS      NEUROMA EXCISION Right 5/26/2017    Procedure: EXCISION MASS / FIBROMA FOOT;  Surgeon: Tatiana Gage DPM;  [facility]: [facility];  Service:     RIGHT OOPHORECTOMY      WISDOM TOOTH EXTRACTION      x4       Family History   Problem Relation Age of Onset    Heart disease Mother     Stroke Mother 58    COPD Mother     Arthritis Mother     Hypertension Father     Kidney disease Brother         kidney transplant    No Known Problems Sister     No Known Problems Maternal Aunt     No Known Problems Maternal Uncle     No Known Problems Paternal Aunt     No Known Problems Paternal Uncle     No Known Problems Maternal Grandmother     No Known Problems Maternal Grandfather     No Known Problems Paternal Grandmother     No Known Problems Paternal Grandfather     ADD / ADHD Neg Hx     Anesthesia problems Neg Hx     Cancer Neg Hx     Clotting disorder Neg Hx     Collagen disease Neg Hx     Diabetes Neg Hx     Dislocations Neg Hx     Learning disabilities Neg Hx     Neurological problems Neg Hx     Osteoporosis Neg Hx     Rheumatologic disease Neg Hx     Scoliosis Neg Hx     Vascular Disease Neg Hx      I have reviewed and agree with the history as documented  Social History     Tobacco Use    Smoking status: Never Smoker    Smokeless tobacco: Never Used   Substance Use Topics    Alcohol use: No    Drug use: No        Review of Systems   Constitutional: Negative  Negative for diaphoresis and fever  HENT: Negative  Respiratory: Negative  Negative for cough, shortness of breath and wheezing  Cardiovascular: Negative  Negative for chest pain, palpitations and leg swelling  Gastrointestinal: Negative for abdominal distention, abdominal pain, nausea and vomiting  Genitourinary: Negative  Musculoskeletal: Negative  Skin: Negative  Neurological: Positive for weakness  Psychiatric/Behavioral: Negative  All other systems reviewed and are negative  Physical Exam  Physical Exam   Constitutional: She is oriented to person, place, and time  She appears well-developed and well-nourished  HENT:   Head: Normocephalic and atraumatic  Eyes: Pupils are equal, round, and reactive to light  EOM are normal    Neck: Normal range of motion  Neck supple  Cardiovascular: Normal rate, regular rhythm and normal heart sounds  No murmur heard  Pulmonary/Chest: Effort normal and breath sounds normal    Abdominal: Soft  Bowel sounds are normal  She exhibits no distension  There is no tenderness  There is no rebound and no guarding  Musculoskeletal: Normal range of motion  She exhibits no edema  Neurological: She is alert and oriented to person, place, and time  Normal motor and sensory exam no focal neurologic deficits   Skin: Skin is warm and dry  Capillary refill takes less than 2 seconds  She is not diaphoretic     Psychiatric: She has a normal mood and affect  Vitals reviewed        Vital Signs  ED Triage Vitals [08/27/19 0837]   Temperature Pulse Respirations Blood Pressure SpO2   97 7 °F (36 5 °C) 87 18 150/79 95 %      Temp Source Heart Rate Source Patient Position - Orthostatic VS BP Location FiO2 (%)   Tympanic Monitor Sitting Right arm --      Pain Score       6           Vitals:    08/27/19 0837 08/27/19 1001   BP: 150/79 128/73   Pulse: 87 84   Patient Position - Orthostatic VS: Sitting Lying         Visual Acuity      ED Medications  Medications   sodium chloride 0 9 % bolus 1,000 mL (0 mL Intravenous Stopped 8/27/19 1001)       Diagnostic Studies  Results Reviewed     Procedure Component Value Units Date/Time    Urine Microscopic [[id number]]  (Abnormal) Collected:  08/27/19 0919    Lab Status:  Final result Specimen:  Urine, Clean Catch Updated:  08/27/19 0942     RBC, UA None Seen /hpf      WBC, UA 0-1 /hpf      Epithelial Cells Occasional /hpf      Bacteria, UA None Seen /hpf     Basic metabolic panel [[id number]] Collected:  08/27/19 0916    Lab Status:  Final result Specimen:  Blood from Arm, Right Updated:  08/27/19 0941     Sodium 141 mmol/L      Potassium 4 5 mmol/L      Chloride 103 mmol/L      CO2 29 mmol/L      ANION GAP 9 mmol/L      BUN 17 mg/dL      Creatinine 0 93 mg/dL      Glucose 95 mg/dL      Calcium 9 4 mg/dL      eGFR 66 ml/min/1 73sq m     Narrative:       [place] guidelines for Chronic Kidney Disease (CKD):     Stage 1 with normal or high GFR (GFR > 90 mL/min/1 73 square meters)    Stage 2 Mild CKD (GFR = 60-89 mL/min/1 73 square meters)    Stage 3A Moderate CKD (GFR = 45-59 mL/min/1 73 square meters)    Stage 3B Moderate CKD (GFR = 30-44 mL/min/1 73 square meters)    Stage 4 Severe CKD (GFR = 15-29 mL/min/1 73 square meters)    Stage 5 End Stage CKD (GFR <15 mL/min/1 73 square meters)  Note: GFR calculation is accurate only with a steady state creatinine    CK Total with Reflex CKMB [[id number]]  (Normal) Collected:  08/27/19 0916    Lab Status:  Final result Specimen:  Blood from Arm, Right Updated:  08/27/19 0941     Total CK 80 U/L     Magnesium [[id number]]  (Normal) Collected:  08/27/19 0916    Lab Status:  Final result Specimen:  Blood from Arm, Right Updated:  08/27/19 0941     Magnesium 2 3 mg/dL     UA w Reflex to Microscopic [[id number]]  (Abnormal) Collected:  08/27/19 0919    Lab Status:  Final result Specimen:  Urine, Clean Catch Updated:  08/27/19 0925     Color, UA Light Yellow     Clarity, UA Clear     Specific Gravity, UA <=1 005     pH, UA 7 5     Leukocytes, UA Small     Nitrite, UA Negative     Protein, UA Negative mg/dl      Glucose, UA Negative mg/dl      Ketones, UA Negative mg/dl      Urobilinogen, UA 0 2 E U /dl      Bilirubin, UA Negative     Blood, UA Negative    CBC and differential [[id number]] Collected:  08/27/19 0916    Lab Status:  Final result Specimen:  Blood from Arm, Right Updated:  08/27/19 0924     WBC 5 89 Thousand/uL      RBC 5 12 Million/uL      Hemoglobin 14 0 g/dL      Hematocrit 44 4 %      MCV 87 fL      MCH 27 3 pg      MCHC 31 5 g/dL      RDW 13 4 %      MPV 9 9 fL      Platelets 250 Thousands/uL      nRBC 0 /100 WBCs      Neutrophils Relative 66 %      Immat GRANS % 0 %      Lymphocytes Relative 22 %      Monocytes Relative 8 %      Eosinophils Relative 3 %      Basophils Relative 1 %      Neutrophils Absolute 3 85 Thousands/µL      Immature Grans Absolute 0 01 Thousand/uL      Lymphocytes Absolute 1 31 Thousands/µL      Monocytes Absolute 0 48 Thousand/µL      Eosinophils Absolute 0 20 Thousand/µL      Basophils Absolute 0 04 Thousands/µL                  No orders to display              Procedures  Procedures       ED Course  ED Course as of Aug 28 0715   Tue Aug 27, 2019   0949 Negative workup will discharge patient                                  MDM    Disposition  Final diagnoses:   Weakness     Time reflects when diagnosis was documented in both MDM as applicable and the Disposition within this note     Time User Action Codes Description Comment    8/27/2019  9:49 AM Parish Tan Add [R53 1] Weakness       ED Disposition     ED Disposition Condition Date/Time Comment    Discharge Stable Tue Aug 27, 2019  9:49 AM Iliana Linares discharge to home/self care  Follow-up Information     Follow up With Specialties Details Why Lizeth Colunga MD Internal Medicine Schedule an appointment as soon as possible for a visit   [address] [place] [address]            Discharge Medication List as of 8/27/2019  9:49 AM      CONTINUE these medications which have NOT CHANGED    Details   acetaminophen (TYLENOL) 325 mg tablet Take 2 tablets (650 mg total) by mouth every 8 (eight) hours as needed for mild pain or headaches, Starting Fri 2/15/2019, Normal      ALPRAZolam (XANAX) 0 5 mg tablet Take by mouth 3 (three) times a day as needed for anxiety, Historical Med      amitriptyline (ELAVIL) 25 mg tablet Take 1 tab at bedtime for one week and if tolerated take 2 tabs at bedtime , Normal      brimonidine (ALPHAGAN P) 0 1 % Administer 1 drop to both eyes once , Historical Med      calcium carbonate 1250 MG capsule Take 1,250 mg by mouth daily , Historical Med      diclofenac sodium (VOLTAREN) 1 % Apply 2 g topically 4 (four) times a day, Starting Wed 3/6/2019, Normal      DULoxetine (CYMBALTA) 30 mg delayed release capsule Take 30 mg by mouth daily , Starting Tue 8/20/2019, Historical Med      esomeprazole (NexIUM) 40 MG capsule Take 40 mg by mouth daily, Starting Tue 8/13/2019, Historical Med      gabapentin (NEURONTIN) 300 mg capsule Take 300 mg by mouth 3 (three) times a day , Starting Thu 7/25/2019, Historical Med      levothyroxine 25 mcg tablet Take 25 mcg by mouth daily, Starting Tue 8/20/2019, Historical Med      magnesium oxide (MAG-OX) 400 mg Take 1 tablet (400 mg total) by mouth daily, Starting Mon 6/24/2019, Normal      methocarbamol (ROBAXIN) 500 mg tablet Take 1 tablet (500 mg total) by mouth 2 (two) times a day, Starting Tue 7/30/2019, Print           No discharge procedures on file      ED Provider  Electronically Signed by           Connie Torres MD  08/28/19 5755

## 2019-08-27 NOTE — PATIENT INSTRUCTIONS
-continue levothyroxine 25mcg once a day    -have labs done in 2 weeks to make sure thyroid labs improve    Follow up in 2 months    Go to the Emergency Room if you are still feeling very fatigue/tired

## 2019-08-28 ENCOUNTER — APPOINTMENT (OUTPATIENT)
Dept: PHYSICAL THERAPY | Facility: CLINIC | Age: 64
End: 2019-08-28
Payer: COMMERCIAL

## 2019-08-29 ENCOUNTER — HOSPITAL ENCOUNTER (OUTPATIENT)
Dept: RADIOLOGY | Facility: HOSPITAL | Age: 64
Discharge: HOME | End: 2019-08-29
Attending: PSYCHIATRY & NEUROLOGY
Payer: COMMERCIAL

## 2019-08-29 DIAGNOSIS — R25.2 SPASTICITY: ICD-10-CM

## 2019-08-29 DIAGNOSIS — R53.1 WEAKNESS: ICD-10-CM

## 2019-08-29 PROCEDURE — 72157 MRI CHEST SPINE W/O & W/DYE: CPT

## 2019-08-29 PROCEDURE — A9585 GADOBUTROL INJECTION: HCPCS | Performed by: PSYCHIATRY & NEUROLOGY

## 2019-08-29 PROCEDURE — 72148 MRI LUMBAR SPINE W/O DYE: CPT

## 2019-08-29 RX ADMIN — GADOBUTROL 4 ML: 604.72 INJECTION INTRAVENOUS at 08:37

## 2019-09-03 ENCOUNTER — APPOINTMENT (OUTPATIENT)
Dept: RADIOLOGY | Facility: HOSPITAL | Age: 64
End: 2019-09-03
Payer: COMMERCIAL

## 2019-09-03 ENCOUNTER — APPOINTMENT (EMERGENCY)
Dept: RADIOLOGY | Facility: HOSPITAL | Age: 64
End: 2019-09-03
Payer: COMMERCIAL

## 2019-09-03 ENCOUNTER — HOSPITAL ENCOUNTER (OUTPATIENT)
Facility: HOSPITAL | Age: 64
Setting detail: OBSERVATION
End: 2019-09-07
Attending: EMERGENCY MEDICINE | Admitting: INTERNAL MEDICINE
Payer: COMMERCIAL

## 2019-09-03 DIAGNOSIS — R53.1 WEAKNESS GENERALIZED: ICD-10-CM

## 2019-09-03 DIAGNOSIS — R53.1 PSYCHOGENIC WEAKNESS: ICD-10-CM

## 2019-09-03 DIAGNOSIS — M12.9 ARTHROPATHY OF MULTIPLE SITES: Primary | ICD-10-CM

## 2019-09-03 DIAGNOSIS — F43.29 STRESS AND ADJUSTMENT REACTION: ICD-10-CM

## 2019-09-03 DIAGNOSIS — R53.1 GENERALIZED WEAKNESS: ICD-10-CM

## 2019-09-03 LAB
ALBUMIN SERPL BCP-MCNC: 3.4 G/DL (ref 3.5–5)
ALP SERPL-CCNC: 79 U/L (ref 46–116)
ALT SERPL W P-5'-P-CCNC: 28 U/L (ref 12–78)
AMPHETAMINES SERPL QL SCN: NEGATIVE
ANION GAP SERPL CALCULATED.3IONS-SCNC: 9 MMOL/L (ref 4–13)
APTT PPP: 26 SECONDS (ref 25–32)
AST SERPL W P-5'-P-CCNC: 20 U/L (ref 5–45)
ATRIAL RATE: 96 BPM
BARBITURATES UR QL: NEGATIVE
BASOPHILS # BLD AUTO: 0.03 THOUSANDS/ΜL (ref 0–0.1)
BASOPHILS NFR BLD AUTO: 1 % (ref 0–1)
BENZODIAZ UR QL: NEGATIVE
BILIRUB SERPL-MCNC: 0.2 MG/DL (ref 0.2–1)
BILIRUB UR QL STRIP: NEGATIVE
BUN SERPL-MCNC: 17 MG/DL (ref 5–25)
CALCIUM SERPL-MCNC: 8.9 MG/DL (ref 8.3–10.1)
CHLORIDE SERPL-SCNC: 104 MMOL/L (ref 100–108)
CLARITY UR: CLEAR
CO2 SERPL-SCNC: 27 MMOL/L (ref 21–32)
COCAINE UR QL: NEGATIVE
COLOR UR: NORMAL
CREAT SERPL-MCNC: 0.93 MG/DL (ref 0.6–1.3)
CRP SERPL QL: 7.7 MG/L
EOSINOPHIL # BLD AUTO: 0.21 THOUSAND/ΜL (ref 0–0.61)
EOSINOPHIL NFR BLD AUTO: 4 % (ref 0–6)
ERYTHROCYTE [DISTWIDTH] IN BLOOD BY AUTOMATED COUNT: 13.4 % (ref 11.6–15.1)
ERYTHROCYTE [SEDIMENTATION RATE] IN BLOOD: 21 MM/HOUR (ref 2–25)
GFR SERPL CREATININE-BSD FRML MDRD: 66 ML/MIN/1.73SQ M
GLUCOSE SERPL-MCNC: 97 MG/DL (ref 65–140)
GLUCOSE UR STRIP-MCNC: NEGATIVE MG/DL
HCT VFR BLD AUTO: 41.8 % (ref 34.8–46.1)
HGB BLD-MCNC: 13.4 G/DL (ref 11.5–15.4)
HGB UR QL STRIP.AUTO: NEGATIVE
IMM GRANULOCYTES # BLD AUTO: 0.02 THOUSAND/UL (ref 0–0.2)
IMM GRANULOCYTES NFR BLD AUTO: 0 % (ref 0–2)
INR PPP: 0.94 (ref 0.91–1.09)
KETONES UR STRIP-MCNC: NEGATIVE MG/DL
LACTATE SERPL-SCNC: 1.6 MMOL/L (ref 0.5–2)
LEUKOCYTE ESTERASE UR QL STRIP: NEGATIVE
LIPASE SERPL-CCNC: 72 U/L (ref 73–393)
LYMPHOCYTES # BLD AUTO: 1.26 THOUSANDS/ΜL (ref 0.6–4.47)
LYMPHOCYTES NFR BLD AUTO: 21 % (ref 14–44)
MAGNESIUM SERPL-MCNC: 2 MG/DL (ref 1.6–2.6)
MCH RBC QN AUTO: 27.3 PG (ref 26.8–34.3)
MCHC RBC AUTO-ENTMCNC: 32.1 G/DL (ref 31.4–37.4)
MCV RBC AUTO: 85 FL (ref 82–98)
METHADONE UR QL: NEGATIVE
MONOCYTES # BLD AUTO: 0.53 THOUSAND/ΜL (ref 0.17–1.22)
MONOCYTES NFR BLD AUTO: 9 % (ref 4–12)
NEUTROPHILS # BLD AUTO: 3.95 THOUSANDS/ΜL (ref 1.85–7.62)
NEUTS SEG NFR BLD AUTO: 65 % (ref 43–75)
NITRITE UR QL STRIP: NEGATIVE
NRBC BLD AUTO-RTO: 0 /100 WBCS
OPIATES UR QL SCN: NEGATIVE
P AXIS: 53 DEGREES
PCP UR QL: NEGATIVE
PH UR STRIP.AUTO: 8 [PH]
PHOSPHATE SERPL-MCNC: 2 MG/DL (ref 2.3–4.1)
PLATELET # BLD AUTO: 242 THOUSANDS/UL (ref 149–390)
PMV BLD AUTO: 10.4 FL (ref 8.9–12.7)
POTASSIUM SERPL-SCNC: 4 MMOL/L (ref 3.5–5.3)
PR INTERVAL: 168 MS
PROT SERPL-MCNC: 7.1 G/DL (ref 6.4–8.2)
PROT UR STRIP-MCNC: NEGATIVE MG/DL
PROTHROMBIN TIME: 10.1 SECONDS (ref 9.8–12)
QRS AXIS: -17 DEGREES
QRSD INTERVAL: 90 MS
QT INTERVAL: 364 MS
QTC INTERVAL: 459 MS
RBC # BLD AUTO: 4.9 MILLION/UL (ref 3.81–5.12)
SODIUM SERPL-SCNC: 140 MMOL/L (ref 136–145)
SP GR UR STRIP.AUTO: 1.01 (ref 1–1.03)
T WAVE AXIS: 33 DEGREES
THC UR QL: NEGATIVE
TROPONIN I SERPL-MCNC: <0.02 NG/ML
TSH SERPL DL<=0.05 MIU/L-ACNC: 3.4 UIU/ML (ref 0.36–3.74)
UROBILINOGEN UR QL STRIP.AUTO: 0.2 E.U./DL
VENTRICULAR RATE: 96 BPM
WBC # BLD AUTO: 6 THOUSAND/UL (ref 4.31–10.16)

## 2019-09-03 PROCEDURE — 80307 DRUG TEST PRSMV CHEM ANLYZR: CPT | Performed by: EMERGENCY MEDICINE

## 2019-09-03 PROCEDURE — 86308 HETEROPHILE ANTIBODY SCREEN: CPT | Performed by: EMERGENCY MEDICINE

## 2019-09-03 PROCEDURE — 93010 ELECTROCARDIOGRAM REPORT: CPT | Performed by: INTERNAL MEDICINE

## 2019-09-03 PROCEDURE — 71045 X-RAY EXAM CHEST 1 VIEW: CPT

## 2019-09-03 PROCEDURE — 86644 CMV ANTIBODY: CPT | Performed by: EMERGENCY MEDICINE

## 2019-09-03 PROCEDURE — 80053 COMPREHEN METABOLIC PANEL: CPT | Performed by: EMERGENCY MEDICINE

## 2019-09-03 PROCEDURE — 86618 LYME DISEASE ANTIBODY: CPT | Performed by: EMERGENCY MEDICINE

## 2019-09-03 PROCEDURE — 87081 CULTURE SCREEN ONLY: CPT | Performed by: INTERNAL MEDICINE

## 2019-09-03 PROCEDURE — 84100 ASSAY OF PHOSPHORUS: CPT | Performed by: EMERGENCY MEDICINE

## 2019-09-03 PROCEDURE — 83605 ASSAY OF LACTIC ACID: CPT | Performed by: EMERGENCY MEDICINE

## 2019-09-03 PROCEDURE — 86645 CMV ANTIBODY IGM: CPT | Performed by: EMERGENCY MEDICINE

## 2019-09-03 PROCEDURE — 70450 CT HEAD/BRAIN W/O DYE: CPT

## 2019-09-03 PROCEDURE — 96361 HYDRATE IV INFUSION ADD-ON: CPT

## 2019-09-03 PROCEDURE — 74019 RADEX ABDOMEN 2 VIEWS: CPT

## 2019-09-03 PROCEDURE — 85610 PROTHROMBIN TIME: CPT | Performed by: EMERGENCY MEDICINE

## 2019-09-03 PROCEDURE — 83735 ASSAY OF MAGNESIUM: CPT | Performed by: EMERGENCY MEDICINE

## 2019-09-03 PROCEDURE — 36415 COLL VENOUS BLD VENIPUNCTURE: CPT | Performed by: EMERGENCY MEDICINE

## 2019-09-03 PROCEDURE — 84484 ASSAY OF TROPONIN QUANT: CPT | Performed by: EMERGENCY MEDICINE

## 2019-09-03 PROCEDURE — 85025 COMPLETE CBC W/AUTO DIFF WBC: CPT | Performed by: EMERGENCY MEDICINE

## 2019-09-03 PROCEDURE — 99285 EMERGENCY DEPT VISIT HI MDM: CPT

## 2019-09-03 PROCEDURE — 96374 THER/PROPH/DIAG INJ IV PUSH: CPT

## 2019-09-03 PROCEDURE — 83690 ASSAY OF LIPASE: CPT | Performed by: EMERGENCY MEDICINE

## 2019-09-03 PROCEDURE — 85730 THROMBOPLASTIN TIME PARTIAL: CPT | Performed by: EMERGENCY MEDICINE

## 2019-09-03 PROCEDURE — 85652 RBC SED RATE AUTOMATED: CPT | Performed by: EMERGENCY MEDICINE

## 2019-09-03 PROCEDURE — 94664 DEMO&/EVAL PT USE INHALER: CPT

## 2019-09-03 PROCEDURE — 86140 C-REACTIVE PROTEIN: CPT | Performed by: EMERGENCY MEDICINE

## 2019-09-03 PROCEDURE — 81003 URINALYSIS AUTO W/O SCOPE: CPT | Performed by: EMERGENCY MEDICINE

## 2019-09-03 PROCEDURE — 93005 ELECTROCARDIOGRAM TRACING: CPT

## 2019-09-03 PROCEDURE — 84443 ASSAY THYROID STIM HORMONE: CPT | Performed by: EMERGENCY MEDICINE

## 2019-09-03 RX ORDER — ALPRAZOLAM 0.5 MG/1
0.5 TABLET ORAL 2 TIMES DAILY PRN
Status: DISCONTINUED | OUTPATIENT
Start: 2019-09-03 | End: 2019-09-07 | Stop reason: HOSPADM

## 2019-09-03 RX ORDER — ACETAMINOPHEN 325 MG/1
650 TABLET ORAL EVERY 8 HOURS PRN
Status: DISCONTINUED | OUTPATIENT
Start: 2019-09-03 | End: 2019-09-07 | Stop reason: HOSPADM

## 2019-09-03 RX ORDER — CEFTRIAXONE 1 G/50ML
1000 INJECTION, SOLUTION INTRAVENOUS EVERY 24 HOURS
Status: DISCONTINUED | OUTPATIENT
Start: 2019-09-03 | End: 2019-09-06

## 2019-09-03 RX ORDER — GABAPENTIN 300 MG/1
300 CAPSULE ORAL 3 TIMES DAILY
Status: DISCONTINUED | OUTPATIENT
Start: 2019-09-03 | End: 2019-09-07 | Stop reason: HOSPADM

## 2019-09-03 RX ORDER — ACETAMINOPHEN 325 MG/1
650 TABLET ORAL EVERY 6 HOURS PRN
Status: DISCONTINUED | OUTPATIENT
Start: 2019-09-03 | End: 2019-09-07 | Stop reason: HOSPADM

## 2019-09-03 RX ORDER — CALCIUM CARBONATE 500(1250)
1 TABLET ORAL 2 TIMES DAILY WITH MEALS
Status: DISCONTINUED | OUTPATIENT
Start: 2019-09-03 | End: 2019-09-07 | Stop reason: HOSPADM

## 2019-09-03 RX ORDER — BRIMONIDINE TARTRATE 0.15 %
1 DROPS OPHTHALMIC (EYE) ONCE
Status: COMPLETED | OUTPATIENT
Start: 2019-09-03 | End: 2019-09-03

## 2019-09-03 RX ORDER — DULOXETIN HYDROCHLORIDE 30 MG/1
30 CAPSULE, DELAYED RELEASE ORAL DAILY
Status: DISCONTINUED | OUTPATIENT
Start: 2019-09-04 | End: 2019-09-07 | Stop reason: HOSPADM

## 2019-09-03 RX ORDER — LEVOTHYROXINE SODIUM 0.03 MG/1
25 TABLET ORAL
Status: DISCONTINUED | OUTPATIENT
Start: 2019-09-04 | End: 2019-09-07 | Stop reason: HOSPADM

## 2019-09-03 RX ORDER — DEXTROSE, SODIUM CHLORIDE, AND POTASSIUM CHLORIDE 5; .45; .15 G/100ML; G/100ML; G/100ML
75 INJECTION INTRAVENOUS CONTINUOUS
Status: DISCONTINUED | OUTPATIENT
Start: 2019-09-03 | End: 2019-09-05

## 2019-09-03 RX ORDER — PANTOPRAZOLE SODIUM 40 MG/1
40 TABLET, DELAYED RELEASE ORAL
Status: DISCONTINUED | OUTPATIENT
Start: 2019-09-04 | End: 2019-09-07 | Stop reason: HOSPADM

## 2019-09-03 RX ORDER — ONDANSETRON 2 MG/ML
4 INJECTION INTRAMUSCULAR; INTRAVENOUS ONCE
Status: COMPLETED | OUTPATIENT
Start: 2019-09-03 | End: 2019-09-03

## 2019-09-03 RX ADMIN — BRIMONIDINE TARTRATE 1 DROP: 1.5 SOLUTION OPHTHALMIC at 22:14

## 2019-09-03 RX ADMIN — SODIUM CHLORIDE 1000 ML: 0.9 INJECTION, SOLUTION INTRAVENOUS at 09:03

## 2019-09-03 RX ADMIN — SODIUM CHLORIDE 1000 ML: 0.9 INJECTION, SOLUTION INTRAVENOUS at 10:35

## 2019-09-03 RX ADMIN — CALCIUM 1 TABLET: 500 TABLET ORAL at 19:44

## 2019-09-03 RX ADMIN — CEFTRIAXONE 1000 MG: 1 INJECTION, SOLUTION INTRAVENOUS at 19:45

## 2019-09-03 RX ADMIN — ENOXAPARIN SODIUM 40 MG: 40 INJECTION SUBCUTANEOUS at 19:44

## 2019-09-03 RX ADMIN — ONDANSETRON 4 MG: 2 INJECTION INTRAMUSCULAR; INTRAVENOUS at 11:10

## 2019-09-03 RX ADMIN — GABAPENTIN 300 MG: 300 CAPSULE ORAL at 22:15

## 2019-09-03 RX ADMIN — DEXTROSE, SODIUM CHLORIDE, AND POTASSIUM CHLORIDE 75 ML/HR: 5; .45; .15 INJECTION INTRAVENOUS at 19:43

## 2019-09-03 NOTE — H&P
H&P Exam - Sandre Stage 59 y o  female MRN: [id number]    Unit/Bed#: [address] [place] [address] Encounter: [id number]      History of Present Illness   59 year patient was relatively comfortable until last Saturday  She started feeling weak on Sunday  Yesterday she felt even weaker  To-day she tried to do her routine work, could not do it  Called ambulance and came to ER  She was evaluated by ER physician and is now being admitted for further evaluation, management and treatment  This symptoms are ongoing with varying severity for last 2 months  During this time, she was evaluated by Neurologist  Had MRI done recently  Also was seen by endocrinologist for " underactive Thyroid problems  She says she is not taking amytryptaline for fear of side effects  Review of Systems   Constitutional: Positive for activity change, appetite change and fatigue  Negative for fever  HENT: Negative for drooling, ear pain, hearing loss and sore throat  Eyes: Negative for pain  Tries to keep eyes closed while talking  Denies any pain there   Respiratory: Negative for cough and shortness of breath  Cardiovascular: Negative for chest pain and palpitations  Gastrointestinal: Negative for abdominal distention and abdominal pain  Endocrine: Negative for polydipsia and polyuria  Genitourinary: Negative for dysuria  Musculoskeletal:        Generalized weakness   Skin: Positive for pallor  Neurological: Negative for seizures and facial asymmetry  Psychiatric/Behavioral: Negative for behavioral problems            Historical Information   Past Medical History:   Diagnosis Date    Anesthesia complication     difficult to wake up    Disease of thyroid gland     Fibromyalgia     GERD (gastroesophageal reflux disease)     Lazy eye     resolved: 3/27/17    Osteopenia     with joint pain-elevated DEV    Tinnitus     Wears glasses     for reading     Past Surgical History:   Procedure Laterality Date    ABDOMINAL SURGERY lysis of adhesions x 2    APPENDECTOMY      CERVICAL FUSION      CHOLECYSTECTOMY      open    DILATION AND CURETTAGE OF UTERUS      NEUROMA EXCISION Right 5/26/2017    Procedure: EXCISION MASS / FIBROMA FOOT;  Surgeon: Joseph Gonsalves DPM;  Location: [facility];  Service:     RIGHT OOPHORECTOMY      WISDOM TOOTH EXTRACTION      x4     Social History   Social History     Substance and Sexual Activity   Alcohol Use Not Currently    Frequency: Never    Binge frequency: Never     Social History     Substance and Sexual Activity   Drug Use No     Social History     Tobacco Use   Smoking Status Never Smoker   Smokeless Tobacco Never Used     Family History: non-contributory    Meds/Allergies   all medications and allergies reviewed  Allergies   Allergen Reactions    Demerol [Meperidine] Lightheadedness     Reaction Date: 11Jan2006;     Sulfa Antibiotics Hives     Reaction Date: 11Jan2006;        Objective   First Vitals:   Blood Pressure: 146/77 (09/03/19 0843)  Pulse: 96 (09/03/19 0843)  Temperature: (!) 97 4 °F (36 3 °C) (09/03/19 0843)  Temp Source: Oral (09/03/19 1700)  Respirations: 22 (09/03/19 0843)  Height: 5' 4" (162 6 cm) (09/03/19 1640)  Weight - Scale: 70 kg (154 lb 5 2 oz) (09/03/19 0843)  SpO2: 98 % (09/03/19 0843)    Current Vitals:   Blood Pressure: 133/90 (09/03/19 1755)  Pulse: 85 (09/03/19 1755)  Temperature: (!) 97 4 °F (36 3 °C) (09/03/19 1700)  Temp Source: Oral (09/03/19 1700)  Respirations: 16 (09/03/19 1700)  Height: 5' 4" (162 6 cm) (09/03/19 1640)  Weight - Scale: 70 kg (154 lb 5 2 oz) (09/03/19 1640)  SpO2: 98 % (09/03/19 1245)      Intake/Output Summary (Last 24 hours) at 9/3/2019 1807  Last data filed at 9/3/2019 1326  Gross per 24 hour   Intake 2000 ml   Output    Net 2000 ml       Invasive Devices     Peripheral Intravenous Line            Peripheral IV 09/03/19 Right Antecubital less than 1 day                Physical Exam   Constitutional: She is oriented to person, place, and time  She appears well-developed  HENT:   Head: Normocephalic and atraumatic  Tenderness over sinus area   Eyes: Left eye exhibits no discharge  No scleral icterus  Neck: Neck supple  No JVD present  Cardiovascular: Normal rate and regular rhythm  Pulmonary/Chest: Breath sounds normal    Abdominal: Soft  There is no tenderness  Musculoskeletal: She exhibits deformity  Neurological: She is alert and oriented to person, place, and time  Skin: She is not diaphoretic  Psychiatric: She has a normal mood and affect  Lab Results: CBC:   Lab Results   Component Value Date    WBC 6 00 09/03/2019    WBC 4 7 03/29/2017    RBC 4 90 09/03/2019     BMP:   Lab Results   Component Value Date    GLUCOSE 95 08/04/2016    SODIUM 140 09/03/2019    CO2 27 09/03/2019    CO2 25 08/04/2016    BUN 17 09/03/2019    BUN 12 08/04/2016    CREATININE 0 93 09/03/2019    CREATININE 0 81 08/04/2016    CALCIUM 8 9 09/03/2019    CALCIUM 9 4 08/04/2016     Coagulation:   Lab Results   Component Value Date    INR 0 94 09/03/2019     Cardiac markers:   Lab Results   Component Value Date    CKMB 2 8 07/30/2019     ABGs: No results found for: PH  CMP:   Recent Labs     09/03/19  0902   K 4 0      CO2 27   BUN 17   CREATININE 0 93   ALKPHOS 79   AST 20   ALT 28   LIPASE 72*   MG 2 0   PHOS 2 0*       Imaging: Xr Chest 1 View Portable    Result Date: 9/3/2019  Narrative: CHEST INDICATION:   cough weakness  COMPARISON:  3/3/2018 chest x-ray EXAM PERFORMED/VIEWS:  XR CHEST PORTABLE Single view FINDINGS: Cardiomediastinal silhouette appears unremarkable  The lungs are clear  No pneumothorax or pleural effusion  Osseous structures appear within normal limits for patient age  Impression: No acute cardiopulmonary disease  Findings are stable Workstation performed: [id number]     Xr Abdomen Complete Inc Upright And/or Decubitus    Result Date: 9/3/2019  Narrative: ABDOMEN INDICATION:   constipation   COMPARISON:  None VIEWS:  AP supine FINDINGS: There is a nonobstructive bowel gas pattern  Large amount of retained stool throughout the colon  No discernible free air on this supine study  Upright or left lateral decubitus imaging is more sensitive to detect subtle free air in the appropriate setting  No pathologic calcifications or soft tissue masses  Visualized lung bases are clear  Visualized osseous structures are unremarkable for the patient's age  Impression: There is a nonobstructive bowel gas pattern  Large amount of retained stool throughout the colon  Workstation performed: [id number]     Mri Lumbar Spine Without Contrast    Result Date: 8/30/2019  Narrative: MRI LUMBAR SPINE WITHOUT CONTRAST INDICATION: R25 2: Cramp and spasm R53 1: Weakness  COMPARISON:  X-ray 4/20/2018 TECHNIQUE:  Sagittal T1, sagittal T2, sagittal inversion recovery, axial T1 and axial T2, coronal T2   IMAGE QUALITY:  Diagnostic FINDINGS: VERTEBRAL BODIES:  Minor right convex L2-3 apex scoliosis without spondylolysis or spondylolisthesis  No fractures  Normal marrow signal is identified within the visualized bony structures  No discrete marrow lesion  SACRUM:  Normal signal within the sacrum  No evidence of insufficiency or stress fracture  DISTAL CORD AND CONUS:  Normal size and signal within the distal cord and conus  PARASPINAL SOFT TISSUES:  Paraspinal soft tissues are unremarkable  LOWER THORACIC DISC SPACES:  Normal disc height and signal   No disc herniation, canal stenosis or foraminal narrowing  LUMBAR DISC SPACES: L1-L2:  Minor facet arthrosis, slight reduction disc, minor L2-L3:  Minor bulge, small marginal osteophytes  Facet arthrosis  L3-L4:  Circumferential bulge, minor facet arthrosis  L4-L5:  Slight loss of disc height, minor bulge, moderate right greater than left facet arthrosis  Moderate narrowing of the right foramen, correlate for right L4 radiculitis   L5-S1:  Moderate bilateral facet arthrosis, minor bulge, small marginal osteophytes, minor endplate changes  Impression: Mild to moderate multilevel degenerative changes  Potentially significant right L4-5 foraminal stenosis, correlate for right L4 radiculitis  Otherwise, changes are noncompressive  Workstation performed: [id number]     Mri Thoracic Spine With And Without Contrast    Result Date: 8/30/2019  Narrative: MRI THORACIC SPINE WITH AND WITHOUT CONTRAST INDICATION: Weakness and spasticity  History of melanoma  COMPARISON:  None  TECHNIQUE:  Sagittal T1, sagittal T2, sagittal inversion recovery, axial T2,  axial 2D MERGE  Sagittal and axial T1 postcontrast  IV Contrast:  4 mL of Gadobutrol injection (SINGLE-DOSE) there was technical difficulty with the IV contrast administration  In total 7 mL was planned for injection however, only 4 mL was administered  A new IV access point could not be acquired for additional contrast administration  IMAGE QUALITY:  Diagnostic  FINDINGS: ALIGNMENT:  There is a mild gradual thoracic kyphosis without evidence of acute angulation, spondylolysis or spondylolisthesis  The vertebral bodies are relatively preserved in stature  MARROW SIGNAL:  A few scattered small hemangiomas are noted within the thoracic vertebral bodies  There is no suspicious marrow edema  THORACIC CORD:  Normal signal within the thoracic cord  PREVERTEBRAL AND PARASPINAL SOFT TISSUES:   Normal  THORACIC DEGENERATIVE CHANGE:  There is minor degenerative discogenic disease  Small central and paracentral disc herniations are noted from T7-T8 to T9-T10  POSTCONTRAST:  No abnormal enhancement  Impression: Unremarkable MRI of the thoracic spine aside from mild degenerative discogenic disease without significant spinal canal or foraminal stenosis  No cord signal abnormality or pathologic enhancement   Workstation performed: [id number]     EKG, Pathology, and Other Studies: Sinus rhythm, rate 96, normal axis, normal intervals, no acute ST/T noted, minimal voltage criteria for LVH     Code Status: Full code status  Advance Directive and Living Will:      Power of :    POLST:        Assessment:  1  Generalized Weakness    2  ADL dysfunction    3  Sinus tenderness    4  Hypothyroidism    5  Fibromyalgia    6  GERD    Principal Problem:    Weakness generalized    Present on Admission:  **None**        Plan:  As per orders  Plan of care discussed with patient in detail  She will be on Full Code Status as per wish

## 2019-09-03 NOTE — ED NOTES
Pt did eat a little lunch, but says she doesn't know why she can't open her eyes        [address], RN  09/03/19 6734

## 2019-09-03 NOTE — PLAN OF CARE
Problem: Potential for Falls  Goal: Patient will remain free of falls  Description  INTERVENTIONS:  - Assess patient frequently for physical needs  -  Identify cognitive and physical deficits and behaviors that affect risk of falls    -  [place] fall precautions as indicated by assessment   - Educate patient/family on patient safety including physical limitations  - Instruct patient to call for assistance with activity based on assessment  - Modify environment to reduce risk of injury  - Consider OT/PT consult to assist with strengthening/mobility  Outcome: Progressing     Problem: MUSCULOSKELETAL - ADULT  Goal: Maintain or return mobility to safest level of function  Description  INTERVENTIONS:  - Assess patient's ability to carry out ADLs; assess patient's baseline for ADL function and identify physical deficits which impact ability to perform ADLs (bathing, care of mouth/teeth, toileting, grooming, dressing, etc )  - Assess/evaluate cause of self-care deficits   - Assess range of motion  - Assess patient's mobility  - Assess patient's need for assistive devices and provide as appropriate  - Encourage maximum independence but intervene and supervise when necessary  - Involve family in performance of ADLs  - Assess for home care needs following discharge   - Consider OT consult to assist with ADL evaluation and planning for discharge  - Provide patient education as appropriate  Outcome: Progressing  Goal: Maintain proper alignment of affected body part  Description  INTERVENTIONS:  - Support, maintain and protect limb and body alignment  - Provide patient/ family with appropriate education  Outcome: Progressing     Problem: PAIN - ADULT  Goal: Verbalizes/displays adequate comfort level or baseline comfort level  Description  Interventions:  - Encourage patient to monitor pain and request assistance  - Assess pain using appropriate pain scale  - Administer analgesics based on type and severity of pain and evaluate response  - Implement non-pharmacological measures as appropriate and evaluate response  - Consider cultural and social influences on pain and pain management  - Notify physician/advanced practitioner if interventions unsuccessful or patient reports new pain  Outcome: Progressing     Problem: DISCHARGE PLANNING  Goal: Discharge to home or other facility with appropriate resources  Description  INTERVENTIONS:  - Identify barriers to discharge w/patient and caregiver  - Arrange for needed discharge resources and transportation as appropriate  - Identify discharge learning needs (meds, wound care, etc )  - Arrange for interpretive services to assist at discharge as needed  - Refer to Case Management Department for coordinating discharge planning if the patient needs post-hospital services based on physician/advanced practitioner order or complex needs related to functional status, cognitive ability, or social support system  Outcome: Progressing     Problem: Knowledge Deficit  Goal: Patient/family/caregiver demonstrates understanding of disease process, treatment plan, medications, and discharge instructions  Description  Complete learning assessment and assess knowledge base    Interventions:  - Provide teaching at level of understanding  - Provide teaching via preferred learning methods  Outcome: Progressing

## 2019-09-03 NOTE — ED PROVIDER NOTES
History  Chief Complaint   Patient presents with    Weakness - Generalized     feeling weak since sunday  states she is having trouble opening her eyes  52-year-old female with past medical history of GERD, fibromyalgia, hypothyroidism, presents to the ED with complaint of generalized weakness and constipation over the past 3 days  Patient felt very lightheaded while vacuuming at home this morning  Patient came to the ED for further evaluation  Patient denies any other focal neuro deficits  History provided by:  Patient      Prior to Admission Medications   Prescriptions Last Dose Informant Patient Reported? Taking? ALPRAZolam (XANAX) 0 5 mg tablet   Yes No   Sig: Take by mouth 3 (three) times a day as needed for anxiety   DULoxetine (CYMBALTA) 30 mg delayed release capsule   Yes No   Sig: Take 30 mg by mouth daily    acetaminophen (TYLENOL) 325 mg tablet   No No   Sig: Take 2 tablets (650 mg total) by mouth every 8 (eight) hours as needed for mild pain or headaches   amitriptyline (ELAVIL) 25 mg tablet  Self No No   Sig: Take 1 tab at bedtime for one week and if tolerated take 2 tabs at bedtime     Patient taking differently: Take 50 mg by mouth daily at bedtime    brimonidine (ALPHAGAN P) 0 1 %   Yes No   Sig: Administer 1 drop to both eyes once    calcium carbonate 1250 MG capsule   Yes No   Sig: Take 1,250 mg by mouth daily    diclofenac sodium (VOLTAREN) 1 %   No No   Sig: Apply 2 g topically 4 (four) times a day   Patient taking differently: Apply 2 g topically as needed    esomeprazole (NexIUM) 40 MG capsule   Yes No   Sig: Take 40 mg by mouth daily   gabapentin (NEURONTIN) 300 mg capsule   Yes No   Sig: Take 300 mg by mouth 3 (three) times a day    levothyroxine 25 mcg tablet   Yes No   Sig: Take 25 mcg by mouth daily   magnesium oxide (MAG-OX) 400 mg   No No   Sig: Take 1 tablet (400 mg total) by mouth daily   Patient not taking: Reported on 8/27/2019   methocarbamol (ROBAXIN) 500 mg tablet No No   Sig: Take 1 tablet (500 mg total) by mouth 2 (two) times a day   Patient not taking: Reported on 8/27/2019      Facility-Administered Medications: None       Past Medical History:   Diagnosis Date    Anesthesia complication     difficult to wake up    Fibromyalgia     GERD (gastroesophageal reflux disease)     Lazy eye     resolved: 3/27/17    Osteopenia     with joint pain-elevated DEV    Tinnitus     Wears glasses     for reading       Past Surgical History:   Procedure Laterality Date    ABDOMINAL SURGERY      lysis of adhesions x 2    APPENDECTOMY      CERVICAL FUSION      CHOLECYSTECTOMY      open    DILATION AND CURETTAGE OF UTERUS      NEUROMA EXCISION Right 5/26/2017    Procedure: EXCISION MASS / FIBROMA FOOT;  Surgeon: Gisela Pritchard DPM;  Location: [facility];  Service:     RIGHT OOPHORECTOMY      WISDOM TOOTH EXTRACTION      x4       Family History   Problem Relation Age of Onset    Heart disease Mother     Stroke Mother 58    COPD Mother     Arthritis Mother     Hypertension Father     Kidney disease Brother         kidney transplant    No Known Problems Sister     No Known Problems Maternal Aunt     No Known Problems Maternal Uncle     No Known Problems Paternal Aunt     No Known Problems Paternal Uncle     No Known Problems Maternal Grandmother     No Known Problems Maternal Grandfather     No Known Problems Paternal Grandmother     No Known Problems Paternal Grandfather     ADD / ADHD Neg Hx     Anesthesia problems Neg Hx     Cancer Neg Hx     Clotting disorder Neg Hx     Collagen disease Neg Hx     Diabetes Neg Hx     Dislocations Neg Hx     Learning disabilities Neg Hx     Neurological problems Neg Hx     Osteoporosis Neg Hx     Rheumatologic disease Neg Hx     Scoliosis Neg Hx     Vascular Disease Neg Hx      I have reviewed and agree with the history as documented      Social History     Tobacco Use    Smoking status: Never Smoker    Smokeless tobacco: Never Used   Substance Use Topics    Alcohol use: No    Drug use: No        Review of Systems   Constitutional: Negative for activity change, appetite change, chills and fever  HENT: Negative for congestion and ear pain  Eyes: Negative for pain and discharge  Respiratory: Negative for cough, chest tightness, shortness of breath, wheezing and stridor  Cardiovascular: Negative for chest pain and palpitations  Gastrointestinal: Negative for abdominal distention, abdominal pain, constipation, diarrhea and nausea  Endocrine: Negative for cold intolerance  Genitourinary: Negative for dysuria, frequency and urgency  Musculoskeletal: Negative for arthralgias and back pain  Skin: Negative for color change and rash  Allergic/Immunologic: Negative for environmental allergies and food allergies  Neurological: Positive for weakness  Negative for dizziness, numbness and headaches  Hematological: Negative for adenopathy  Psychiatric/Behavioral: Negative for agitation, behavioral problems and confusion  The patient is not nervous/anxious  All other systems reviewed and are negative  Physical Exam  Physical Exam   Constitutional: She is oriented to person, place, and time  She appears well-developed and well-nourished  HENT:   Head: Normocephalic and atraumatic  Mouth/Throat: Oropharynx is clear and moist    Eyes: Conjunctivae and EOM are normal    Neck: Normal range of motion  Neck supple  Cardiovascular: Normal rate, regular rhythm, normal heart sounds and intact distal pulses  Pulmonary/Chest: Effort normal and breath sounds normal    Abdominal: Soft  Bowel sounds are normal  She exhibits no distension  There is no tenderness  Musculoskeletal: Normal range of motion  Neurological: She is alert and oriented to person, place, and time  Patient is alert and oriented x3    Finger-to-nose intact bilateral   Heel-to-shin intact bilateral   Sensory and motor strength intact bilateral   Generalized weakness noted without any focal neuro deficits  Skin: Skin is warm and dry  Psychiatric: She has a normal mood and affect  Her behavior is normal  Judgment and thought content normal    Nursing note and vitals reviewed  Vital Signs  ED Triage Vitals   Temperature Pulse Respirations Blood Pressure SpO2   09/03/19 0843 09/03/19 0843 09/03/19 0843 09/03/19 0843 09/03/19 0843   (!) 97 4 °F (36 3 °C) 96 22 146/77 98 %      Temp src Heart Rate Source Patient Position - Orthostatic VS BP Location FiO2 (%)   -- 09/03/19 1245 -- -- --    Monitor         Pain Score       09/03/19 0843       7           Vitals:    09/03/19 1200 09/03/19 1215 09/03/19 1230 09/03/19 1245   BP:  161/85  151/87   Pulse: 86 84 82 80         Visual Acuity      ED Medications  Medications   sodium chloride 0 9 % bolus 1,000 mL (0 mL Intravenous Stopped 9/3/19 1003)   sodium chloride 0 9 % bolus 1,000 mL (0 mL Intravenous Stopped 9/3/19 1326)   ondansetron (ZOFRAN) injection 4 mg (4 mg Intravenous Given 9/3/19 1110)       Diagnostic Studies  Results Reviewed     Procedure Component Value Units Date/Time    Sedimentation rate, automated [[id number]] Collected:  09/03/19 1552    Lab Status: In process Specimen:  Blood from Arm, Right Updated:  09/03/19 1554    Mononucleosis screen [[id number]] Collected:  09/03/19 1540    Lab Status: In process Specimen:  Blood from Arm, Right Updated:  09/03/19 1543    CMV IgG/IgM Antibodies [[id number]] Collected:  09/03/19 1540    Lab Status: In process Specimen:  Blood from Arm, Right Updated:  09/03/19 1543    Lyme Antibody Profile with reflex to [facility] [[id number]] Collected:  09/03/19 1540    Lab Status:   In process Specimen:  Blood from Arm, Right Updated:  09/03/19 1543    C-reactive protein [[id number]]     Lab Status:  No result Specimen:  Blood     Rapid drug screen, urine [[id number]]     Lab Status:  No result Specimen:  Urine     Rapid drug screen, urine [[id number]]  (Normal) Collected:  09/03/19 1115    Lab Status:  Final result Specimen:  Urine, Clean Catch Updated:  09/03/19 1220     Amph/Meth UR Negative     Barbiturate Ur Negative     Benzodiazepine Urine Negative     Cocaine Urine Negative     Methadone Urine Negative     Opiate Urine Negative     PCP Ur Negative     THC Urine Negative    Narrative:       FOR MEDICAL PURPOSES ONLY  IF CONFIRMATION NEEDED PLEASE CONTACT THE LAB WITHIN 5 DAYS  Drug Screen Cutoff Levels:  AMPHETAMINE/METHAMPHETAMINES  1000 ng/mL  BARBITURATES     200 ng/mL  BENZODIAZEPINES     200 ng/mL  COCAINE      300 ng/mL  METHADONE      300 ng/mL  OPIATES      300 ng/mL  PHENCYCLIDINE     25 ng/mL  THC       50 ng/mL      UA w Reflex to Microscopic w Reflex to Culture [[id number]] Collected:  09/03/19 1115    Lab Status:  Final result Specimen:  Urine, Clean Catch Updated:  09/03/19 1123     Color, UA Light Yellow     Clarity, UA Clear     Specific Gravity, UA 1 010     pH, UA 8 0     Leukocytes, UA Negative     Nitrite, UA Negative     Protein, UA Negative mg/dl      Glucose, UA Negative mg/dl      Ketones, UA Negative mg/dl      Urobilinogen, UA 0 2 E U /dl      Bilirubin, UA Negative     Blood, UA Negative    Lactic acid, plasma [[id number]]  (Normal) Collected:  09/03/19 0902    Lab Status:  Final result Specimen:  Blood from Arm, Right Updated:  09/03/19 0942     LACTIC ACID 1 6 mmol/L     Narrative:       Result may be elevated if tourniquet was used during collection  TSH, 3rd generation with Free T4 reflex [[id number]]  (Normal) Collected:  09/03/19 0902    Lab Status:  Final result Specimen:  Blood from Arm, Right Updated:  09/03/19 0939     TSH 3RD GENERATON 3 404 uIU/mL     Narrative:       Patients undergoing fluorescein dye angiography may retain small amounts of fluorescein in the body for 48-72 hours post procedure  Samples containing fluorescein can produce falsely depressed TSH values   If the patient had this procedure,a specimen should be resubmitted post fluorescein clearance        Phosphorus [[id number]]  (Abnormal) Collected:  09/03/19 0902    Lab Status:  Final result Specimen:  Blood from Arm, Right Updated:  09/03/19 0939     Phosphorus 2 0 mg/dL     Magnesium [[id number]]  (Normal) Collected:  09/03/19 0902    Lab Status:  Final result Specimen:  Blood from Arm, Right Updated:  09/03/19 0939     Magnesium 2 0 mg/dL     Lipase [[id number]]  (Abnormal) Collected:  09/03/19 0902    Lab Status:  Final result Specimen:  Blood from Arm, Right Updated:  09/03/19 0939     Lipase 72 u/L     Troponin I [[id number]]  (Normal) Collected:  09/03/19 0902    Lab Status:  Final result Specimen:  Blood from Arm, Right Updated:  09/03/19 0938     Troponin I <0 02 ng/mL     Comprehensive metabolic panel [[id number]]  (Abnormal) Collected:  09/03/19 0902    Lab Status:  Final result Specimen:  Blood from Arm, Right Updated:  09/03/19 0930     Sodium 140 mmol/L      Potassium 4 0 mmol/L      Chloride 104 mmol/L      CO2 27 mmol/L      ANION GAP 9 mmol/L      BUN 17 mg/dL      Creatinine 0 93 mg/dL      Glucose 97 mg/dL      Calcium 8 9 mg/dL      AST 20 U/L      ALT 28 U/L      Alkaline Phosphatase 79 U/L      Total Protein 7 1 g/dL      Albumin 3 4 g/dL      Total Bilirubin 0 20 mg/dL      eGFR 66 ml/min/1 73sq m     Narrative:       [place] guidelines for Chronic Kidney Disease (CKD):     Stage 1 with normal or high GFR (GFR > 90 mL/min/1 73 square meters)    Stage 2 Mild CKD (GFR = 60-89 mL/min/1 73 square meters)    Stage 3A Moderate CKD (GFR = 45-59 mL/min/1 73 square meters)    Stage 3B Moderate CKD (GFR = 30-44 mL/min/1 73 square meters)    Stage 4 Severe CKD (GFR = 15-29 mL/min/1 73 square meters)    Stage 5 End Stage CKD (GFR <15 mL/min/1 73 square meters)  Note: GFR calculation is accurate only with a steady state creatinine    Protime-INR [[id number]]  (Normal) Collected:  09/03/19 0902    Lab Status:  Final result Specimen: Blood from Arm, Right Updated:  09/03/19 0926     Protime 10 1 seconds      INR 0 94    APTT [[id number]]  (Normal) Collected:  09/03/19 0902    Lab Status:  Final result Specimen:  Blood from Arm, Right Updated:  09/03/19 0926     PTT 26 seconds     CBC and differential [[id number]] Collected:  09/03/19 0902    Lab Status:  Final result Specimen:  Blood from Arm, Right Updated:  09/03/19 0912     WBC 6 00 Thousand/uL      RBC 4 90 Million/uL      Hemoglobin 13 4 g/dL      Hematocrit 41 8 %      MCV 85 fL      MCH 27 3 pg      MCHC 32 1 g/dL      RDW 13 4 %      MPV 10 4 fL      Platelets 842 Thousands/uL      nRBC 0 /100 WBCs      Neutrophils Relative 65 %      Immat GRANS % 0 %      Lymphocytes Relative 21 %      Monocytes Relative 9 %      Eosinophils Relative 4 %      Basophils Relative 1 %      Neutrophils Absolute 3 95 Thousands/µL      Immature Grans Absolute 0 02 Thousand/uL      Lymphocytes Absolute 1 26 Thousands/µL      Monocytes Absolute 0 53 Thousand/µL      Eosinophils Absolute 0 21 Thousand/µL      Basophils Absolute 0 03 Thousands/µL                  XR abdomen complete inc upright and/or decubitus   Final Result by Lee Lu MD (09/03 6618)      There is a nonobstructive bowel gas pattern  Large amount of retained stool throughout the colon  Workstation performed: [id number]         XR chest 1 view portable   Final Result by Julio Batista MD (09/03 1216)      No acute cardiopulmonary disease        Findings are stable      Workstation performed: [id number]                    Procedures  ECG 12 Lead Documentation Only  Date/Time: 9/3/2019 9:00 AM  Performed by: Sommer Barragan DO  Authorized by: Sommer Barragan DO     Indications / Diagnosis:  Weakness  ECG reviewed by me, the ED Provider: yes    Patient location:  ED  Previous ECG:     Previous ECG:  Compared to current    Similarity:  No change    Comparison to cardiac monitor: Yes    Interpretation:     Interpretation: abnormal    Comments:      Sinus rhythm, rate 96, normal axis, normal intervals, no acute ST/T noted, minimal voltage criteria for LVH that is unchanged from previous study  ED Course                               MDM  Number of Diagnoses or Management Options  Generalized weakness: new and requires workup  Diagnosis management comments: Obtain orthostatic vital signs, blood work, EKG  Obtain obstruction series  Give IV fluids and reassess symptoms       Amount and/or Complexity of Data Reviewed  Clinical lab tests: ordered and reviewed  Tests in the radiology section of CPT®: ordered and reviewed  Tests in the medicine section of CPT®: ordered and reviewed  Review and summarize past medical records: yes  Independent visualization of images, tracings, or specimens: yes    Risk of Complications, Morbidity, and/or Mortality  General comments: Lab and radiology results were essentially unremarkable  Patient continued to present signs of weakness and refusing to open her eyes  Other lab work to check for fatigue such as Lyme titer, Monospot, CMV are all pending  Patient is alert and oriented x3  ED staff spoke with patient's  who states that patient is more weak than usual   Patient tolerated some food in the ED  At this time patient is admitted to observation for weakness  Patient and family agrees with admission plans      Patient Progress  Patient progress: stable      Disposition  Final diagnoses:   Generalized weakness     Time reflects when diagnosis was documented in both MDM as applicable and the Disposition within this note     Time User Action Codes Description Comment    9/3/2019  3:30 PM Brian Seat Add [R53 1] Generalized weakness       ED Disposition     ED Disposition Condition Date/Time Comment    Admit Stable Tue Sep 3, 2019  3:31 PM Case was discussed with Dr Karin Phillips and the patient's admission status was agreed to be Admission Status: observation status to the service of Dr Evelyn Calloway  Follow-up Information    None         Patient's Medications   Discharge Prescriptions    No medications on file     No discharge procedures on file      ED Provider  Electronically Signed by           Tera Pascal DO  09/03/19 5750

## 2019-09-03 NOTE — RESPIRATORY THERAPY NOTE
Respiratory Protocol completed, Incentive Spirometer ordered and given to pt with instructions and return demo

## 2019-09-04 ENCOUNTER — APPOINTMENT (OUTPATIENT)
Dept: RADIOLOGY | Facility: HOSPITAL | Age: 64
End: 2019-09-04
Payer: COMMERCIAL

## 2019-09-04 LAB
ALBUMIN SERPL BCP-MCNC: 2.7 G/DL (ref 3.5–5)
ALP SERPL-CCNC: 65 U/L (ref 46–116)
ALT SERPL W P-5'-P-CCNC: 20 U/L (ref 12–78)
ANION GAP SERPL CALCULATED.3IONS-SCNC: 7 MMOL/L (ref 4–13)
AST SERPL W P-5'-P-CCNC: 28 U/L (ref 5–45)
BILIRUB SERPL-MCNC: 0.3 MG/DL (ref 0.2–1)
BUN SERPL-MCNC: 11 MG/DL (ref 5–25)
CALCIUM SERPL-MCNC: 7.9 MG/DL (ref 8.3–10.1)
CHLORIDE SERPL-SCNC: 106 MMOL/L (ref 100–108)
CO2 SERPL-SCNC: 27 MMOL/L (ref 21–32)
CREAT SERPL-MCNC: 0.74 MG/DL (ref 0.6–1.3)
GFR SERPL CREATININE-BSD FRML MDRD: 87 ML/MIN/1.73SQ M
GLUCOSE P FAST SERPL-MCNC: 105 MG/DL (ref 65–99)
GLUCOSE SERPL-MCNC: 105 MG/DL (ref 65–140)
GLUCOSE SERPL-MCNC: 146 MG/DL (ref 65–140)
HETEROPH AB SER QL: NEGATIVE
MAGNESIUM SERPL-MCNC: 1.9 MG/DL (ref 1.6–2.6)
PHOSPHATE SERPL-MCNC: 3.5 MG/DL (ref 2.3–4.1)
POTASSIUM SERPL-SCNC: 4.7 MMOL/L (ref 3.5–5.3)
PROT SERPL-MCNC: 6 G/DL (ref 6.4–8.2)
SODIUM SERPL-SCNC: 140 MMOL/L (ref 136–145)

## 2019-09-04 PROCEDURE — 80053 COMPREHEN METABOLIC PANEL: CPT | Performed by: INTERNAL MEDICINE

## 2019-09-04 PROCEDURE — 99225 PR SBSQ OBSERVATION CARE/DAY 25 MINUTES: CPT | Performed by: NURSE PRACTITIONER

## 2019-09-04 PROCEDURE — G8979 MOBILITY GOAL STATUS: HCPCS

## 2019-09-04 PROCEDURE — 70496 CT ANGIOGRAPHY HEAD: CPT

## 2019-09-04 PROCEDURE — 99243 OFF/OP CNSLTJ NEW/EST LOW 30: CPT | Performed by: PSYCHIATRY & NEUROLOGY

## 2019-09-04 PROCEDURE — G8987 SELF CARE CURRENT STATUS: HCPCS

## 2019-09-04 PROCEDURE — 93005 ELECTROCARDIOGRAM TRACING: CPT

## 2019-09-04 PROCEDURE — 97116 GAIT TRAINING THERAPY: CPT

## 2019-09-04 PROCEDURE — 97163 PT EVAL HIGH COMPLEX 45 MIN: CPT

## 2019-09-04 PROCEDURE — G8978 MOBILITY CURRENT STATUS: HCPCS

## 2019-09-04 PROCEDURE — 97167 OT EVAL HIGH COMPLEX 60 MIN: CPT

## 2019-09-04 PROCEDURE — 84100 ASSAY OF PHOSPHORUS: CPT | Performed by: INTERNAL MEDICINE

## 2019-09-04 PROCEDURE — G8988 SELF CARE GOAL STATUS: HCPCS

## 2019-09-04 PROCEDURE — 82948 REAGENT STRIP/BLOOD GLUCOSE: CPT

## 2019-09-04 PROCEDURE — 70498 CT ANGIOGRAPHY NECK: CPT

## 2019-09-04 PROCEDURE — 83735 ASSAY OF MAGNESIUM: CPT | Performed by: INTERNAL MEDICINE

## 2019-09-04 RX ADMIN — ENOXAPARIN SODIUM 40 MG: 40 INJECTION SUBCUTANEOUS at 09:43

## 2019-09-04 RX ADMIN — DEXTROSE, SODIUM CHLORIDE, AND POTASSIUM CHLORIDE 75 ML/HR: 5; .45; .15 INJECTION INTRAVENOUS at 13:58

## 2019-09-04 RX ADMIN — GABAPENTIN 300 MG: 300 CAPSULE ORAL at 18:04

## 2019-09-04 RX ADMIN — GABAPENTIN 300 MG: 300 CAPSULE ORAL at 09:43

## 2019-09-04 RX ADMIN — ACETAMINOPHEN 650 MG: 325 TABLET, FILM COATED ORAL at 05:02

## 2019-09-04 RX ADMIN — Medication 400 MG: at 09:43

## 2019-09-04 RX ADMIN — ACETAMINOPHEN 650 MG: 325 TABLET, FILM COATED ORAL at 15:38

## 2019-09-04 RX ADMIN — GABAPENTIN 300 MG: 300 CAPSULE ORAL at 21:40

## 2019-09-04 RX ADMIN — PANTOPRAZOLE SODIUM 40 MG: 40 TABLET, DELAYED RELEASE ORAL at 05:05

## 2019-09-04 RX ADMIN — IOHEXOL 85 ML: 350 INJECTION, SOLUTION INTRAVENOUS at 10:23

## 2019-09-04 RX ADMIN — CEFTRIAXONE 1000 MG: 1 INJECTION, SOLUTION INTRAVENOUS at 20:10

## 2019-09-04 RX ADMIN — DULOXETINE HYDROCHLORIDE 30 MG: 30 CAPSULE, DELAYED RELEASE ORAL at 09:42

## 2019-09-04 RX ADMIN — CALCIUM 1 TABLET: 500 TABLET ORAL at 09:43

## 2019-09-04 RX ADMIN — ENOXAPARIN SODIUM 40 MG: 40 INJECTION SUBCUTANEOUS at 18:05

## 2019-09-04 RX ADMIN — CALCIUM 1 TABLET: 500 TABLET ORAL at 18:04

## 2019-09-04 RX ADMIN — LEVOTHYROXINE SODIUM 25 MCG: 25 TABLET ORAL at 05:05

## 2019-09-04 NOTE — PROGRESS NOTES
Progress Note - Fadia Sun 59 y o  female MRN: [id number]    Unit/Bed#: [address] [place] [address] Encounter: [id number]      History of Present Illness   Patient was seen earlier and said she could not keep her eye open, Approximately 10 minutes later, nurse noticed facial asymetry and Rapid response - Stroke alert was initiated  Vitals were acceptable  Review of Systems     Constitutional: Positive for activity change, appetite change and fatigue  Negative for fever  HENT: Negative for drooling, ear pain, hearing loss and sore throat  Tenderness over sinus area  Eyes: Negative for pain  Tries to keep eyes closed while talking  Denies any pain there   Respiratory: Negative for cough and shortness of breath  Cardiovascular: Negative for chest pain and palpitations  Gastrointestinal: Negative for abdominal distention and abdominal pain  Endocrine: Negative for polydipsia and polyuria  Genitourinary: Negative for dysuria  Musculoskeletal:      Generalized weakness   Neurological: Negative for seizures  Psychiatric/Behavioral: Negative for behavioral problems       Objective   First Vitals:   Blood Pressure: 146/77 (09/03/19 0843)  Pulse: 96 (09/03/19 0843)  Temperature: (!) 97 4 °F (36 3 °C) (09/03/19 0843)  Temp Source: Oral (09/03/19 1700)  Respirations: 22 (09/03/19 0843)  Height: 5' 4" (162 6 cm) (09/03/19 1640)  Weight - Scale: 70 kg (154 lb 5 2 oz) (09/03/19 0843)  SpO2: 98 % (09/03/19 0843)    Current Vitals:   Blood Pressure: 162/82(manual ) (09/04/19 0956)  Pulse: 104 (09/04/19 1001)  Temperature: 97 6 °F (36 4 °C) (09/04/19 0942)  Temp Source: Temporal (09/04/19 0942)  Respirations: 16 (09/04/19 1001)  Height: 5' 4" (162 6 cm) (09/03/19 1640)  Weight - Scale: 74 8 kg (165 lb) (09/04/19 0600)  SpO2: 99 % (09/04/19 1001)      Intake/Output Summary (Last 24 hours) at 9/4/2019 1043  Last data filed at 9/4/2019 0601  Gross per 24 hour   Intake 1000 ml   Output 550 ml   Net 450 ml       Invasive Devices Peripheral Intravenous Line            Peripheral IV 09/04/19 Left Arm less than 1 day                Physical Exam  Constitutional: She is oriented to person, place, and time  She appears well-developed  HENT: Head: Normocephalic and atraumatic  Tenderness over sinus area   Eyes: Left eye exhibits no discharge  No scleral icterus  Tries to keep eyes closed  Neck: Neck supple  No JVD present  Cardiovascular: Normal rate and regular rhythm  Pulmonary/Chest: Breath sounds normal    Abdominal: Soft  There is no tenderness  Neurological: She is alert and oriented to person, place, and time  Facial asymetry   Skin: She is not diaphoretic  Psychiatric: She has a normal mood and affect  Lab Results:    Lab Results   Component Value Date    WBC 6 00 09/03/2019    WBC 4 7 03/29/2017    RBC 4 90 09/03/2019     BMP:   Lab Results   Component Value Date    GLUCOSE 95 08/04/2016    SODIUM 140 09/04/2019    CO2 27 09/04/2019    CO2 25 08/04/2016    BUN 11 09/04/2019    BUN 12 08/04/2016    CREATININE 0 74 09/04/2019    CREATININE 0 81 08/04/2016    CALCIUM 7 9 (L) 09/04/2019    CALCIUM 9 4 08/04/2016     Coagulation:   Lab Results   Component Value Date    INR 0 94 09/03/2019     Cardiac markers:   Lab Results   Component Value Date    CKMB 2 8 07/30/2019     ABGs: No results found for: PH  CMP:   Recent Labs     09/03/19  0902 09/04/19  0536   K 4 0 4 7    106   CO2 27 27   BUN 17 11   CREATININE 0 93 0 74   ALKPHOS 79 65   AST 20 28   ALT 28 20   LIPASE 72*  --    MG 2 0 1 9   PHOS 2 0* 3 5       Imaging: Xr Chest 1 View Portable    Result Date: 9/3/2019  Narrative: CHEST INDICATION:   cough weakness  COMPARISON:  3/3/2018 chest x-ray EXAM PERFORMED/VIEWS:  XR CHEST PORTABLE Single view FINDINGS: Cardiomediastinal silhouette appears unremarkable  The lungs are clear  No pneumothorax or pleural effusion  Osseous structures appear within normal limits for patient age       Impression: No acute cardiopulmonary disease  Findings are stable Workstation performed: [id number]     Xr Abdomen Complete Inc Upright And/or Decubitus    Result Date: 9/3/2019  Narrative: ABDOMEN INDICATION:   constipation  COMPARISON:  None VIEWS:  AP supine FINDINGS: There is a nonobstructive bowel gas pattern  Large amount of retained stool throughout the colon  No discernible free air on this supine study  Upright or left lateral decubitus imaging is more sensitive to detect subtle free air in the appropriate setting  No pathologic calcifications or soft tissue masses  Visualized lung bases are clear  Visualized osseous structures are unremarkable for the patient's age  Impression: There is a nonobstructive bowel gas pattern  Large amount of retained stool throughout the colon  Workstation performed: [id number]     Mri Lumbar Spine Without Contrast    Result Date: 8/30/2019  Narrative: MRI LUMBAR SPINE WITHOUT CONTRAST INDICATION: R25 2: Cramp and spasm R53 1: Weakness  COMPARISON:  X-ray 4/20/2018 TECHNIQUE:  Sagittal T1, sagittal T2, sagittal inversion recovery, axial T1 and axial T2, coronal T2   IMAGE QUALITY:  Diagnostic FINDINGS: VERTEBRAL BODIES:  Minor right convex L2-3 apex scoliosis without spondylolysis or spondylolisthesis  No fractures  Normal marrow signal is identified within the visualized bony structures  No discrete marrow lesion  SACRUM:  Normal signal within the sacrum  No evidence of insufficiency or stress fracture  DISTAL CORD AND CONUS:  Normal size and signal within the distal cord and conus  PARASPINAL SOFT TISSUES:  Paraspinal soft tissues are unremarkable  LOWER THORACIC DISC SPACES:  Normal disc height and signal   No disc herniation, canal stenosis or foraminal narrowing  LUMBAR DISC SPACES: L1-L2:  Minor facet arthrosis, slight reduction disc, minor L2-L3:  Minor bulge, small marginal osteophytes  Facet arthrosis  L3-L4:  Circumferential bulge, minor facet arthrosis    L4-L5:  Slight loss of disc height, minor bulge, moderate right greater than left facet arthrosis  Moderate narrowing of the right foramen, correlate for right L4 radiculitis  L5-S1:  Moderate bilateral facet arthrosis, minor bulge, small marginal osteophytes, minor endplate changes  Impression: Mild to moderate multilevel degenerative changes  Potentially significant right L4-5 foraminal stenosis, correlate for right L4 radiculitis  Otherwise, changes are noncompressive  Workstation performed: [id number]     Mri Thoracic Spine With And Without Contrast    Result Date: 8/30/2019  Narrative: MRI THORACIC SPINE WITH AND WITHOUT CONTRAST INDICATION: Weakness and spasticity  History of melanoma  COMPARISON:  None  TECHNIQUE:  Sagittal T1, sagittal T2, sagittal inversion recovery, axial T2,  axial 2D MERGE  Sagittal and axial T1 postcontrast  IV Contrast:  4 mL of Gadobutrol injection (SINGLE-DOSE) there was technical difficulty with the IV contrast administration  In total 7 mL was planned for injection however, only 4 mL was administered  A new IV access point could not be acquired for additional contrast administration  IMAGE QUALITY:  Diagnostic  FINDINGS: ALIGNMENT:  There is a mild gradual thoracic kyphosis without evidence of acute angulation, spondylolysis or spondylolisthesis  The vertebral bodies are relatively preserved in stature  MARROW SIGNAL:  A few scattered small hemangiomas are noted within the thoracic vertebral bodies  There is no suspicious marrow edema  THORACIC CORD:  Normal signal within the thoracic cord  PREVERTEBRAL AND PARASPINAL SOFT TISSUES:   Normal  THORACIC DEGENERATIVE CHANGE:  There is minor degenerative discogenic disease  Small central and paracentral disc herniations are noted from T7-T8 to T9-T10  POSTCONTRAST:  No abnormal enhancement       Impression: Unremarkable MRI of the thoracic spine aside from mild degenerative discogenic disease without significant spinal canal or foraminal stenosis  No cord signal abnormality or pathologic enhancement  Workstation performed: [id number]     Ct Stroke Alert Brain    Result Date: 9/4/2019  Narrative: CT BRAIN - STROKE ALERT PROTOCOL INDICATION:   Focal neuro deficit, new, fixed or worsening, <6 hours  Right sided facial droop started at 9:55 AM COMPARISON:  9/3/2019; 2/11/2019; 7/30/2019 TECHNIQUE:  CT examination of the brain was performed  In addition to axial images, coronal reformatted images were created and submitted for interpretation  Radiation dose length product (DLP) for this visit:  951 62 mGy-cm   This examination, like all CT scans performed in the [facility], was performed utilizing techniques to minimize radiation dose exposure, including the use of iterative  reconstruction and automated exposure control  IMAGE QUALITY:  Diagnostic  FINDINGS:  PARENCHYMA:  No intracranial mass, mass effect or midline shift  No acute intracranial hemorrhage  No CT signs of acute infarction  Normal intracranial vasculature  VENTRICLES AND EXTRA-AXIAL SPACES:  Normal for patient's age  VISUALIZED ORBITS AND PARANASAL SINUSES:  Small retention cyst anteriorly in the left maxillary sinus  CALVARIUM AND EXTRACRANIAL SOFT TISSUES:   Normal      Impression: No acute intracranial hemorrhage, mass effect or edema  Findings were directly discussed with Dr Aleksey Carrillo on 9/4/2019 10:20 AM  Workstation performed: [id number]     Cta Stroke Alert (head/neck)    Result Date: 9/4/2019  Narrative: CTA NECK AND BRAIN WITH CONTRAST INDICATION: Focal neuro deficit, new, fixed or worsening, <6 hours right facial droop started this morning  COMPARISON:   9/3/2019; 7/30/2019; 2/13/2019; 2/11/2019 TECHNIQUE:   Post contrast imaging was performed after administration of iodinated contrast through the neck and brain  Post contrast axial 0 625 mm images timed to opacify the arterial system  3D rendering was performed on an independent workstation     MIP reconstructions performed  Coronal reconstructions were performed of the noncontrast portion of the brain  Radiation dose length product (DLP) for this visit:  304 27 mGy-cm   This examination, like all CT scans performed in the [facility], was performed utilizing techniques to minimize radiation dose exposure, including the use of iterative  reconstruction and automated exposure control  IV Contrast:  85 mL of iohexol (OMNIPAQUE)  IMAGE QUALITY:   Diagnostic FINDINGS: CERVICAL VASCULATURE AORTIC ARCH AND GREAT VESSELS:  Normal aortic arch and great vessel origins  Normal visualized subclavian vessels  RIGHT VERTEBRAL ARTERY CERVICAL SEGMENT:  Normal origin  The vessel is normal in caliber throughout the neck  LEFT VERTEBRAL ARTERY CERVICAL SEGMENT:  Normal origin  The vessel is normal in caliber throughout the neck  RIGHT EXTRACRANIAL CAROTID SEGMENT:  Normal caliber common carotid artery  Normal bifurcation and cervical internal carotid artery  No stenosis or dissection  LEFT EXTRACRANIAL CAROTID SEGMENT:  Normal caliber common carotid artery  Normal bifurcation and cervical internal carotid artery  No stenosis or dissection  NASCET criteria was used to determine the degree of internal carotid artery diameter stenosis  INTRACRANIAL VASCULATURE INTERNAL CAROTID ARTERIES:  Normal enhancement of the intracranial portions of the internal carotid arteries  Normal ophthalmic artery origins  Normal ICA terminus  ANTERIOR CIRCULATION:  Symmetric A1 segments and anterior cerebral arteries with normal enhancement  Normal anterior communicating artery  MIDDLE CEREBRAL ARTERY CIRCULATION:  M1 segment and middle cerebral artery branches demonstrate normal enhancement bilaterally  DISTAL VERTEBRAL ARTERIES:  Normal distal vertebral arteries  Posterior inferior cerebellar artery origins are normal  Normal vertebral basilar junction  BASILAR ARTERY:  Basilar artery is normal in caliber    Normal superior cerebellar arteries  POSTERIOR CEREBRAL ARTERIES: Both posterior cerebral arteries arises from the basilar tip  Both arteries demonstrate normal enhancement  Normal posterior communicating arteries  DURAL VENOUS SINUSES:  Normal  NON VASCULAR ANATOMY BONY STRUCTURES:  Cervical fusion C3-4 C4-C5 noted with prosthetic intervertebral disc spacers  Orthopedic hardware well seated within bone  Mild spondylotic changes noted  SOFT TISSUES OF THE NECK:  Unremarkable  THORACIC INLET:  Unremarkable  Impression: 1  No hemodynamically significant stenosis in the major arteries of the neck  2   No intracranial aneurysm or major intracranial arterial stenosis  Findings were directly discussed with Dr Andreina Horvath on 9/4/2019 10:20 AM  Workstation performed: [id number]       Code Status: Level 1 - Full Code  Advance Directive and Living Will:      Power of :    POLST:      Assessment:  1  Facial Asymetry - Neuro eval - Stroke alert    2  Generalized weakness - No obvious etiology found yet    3  Hypothyroidism - Cintinue synthroid   Principal Problem:    Weakness generalized    Present on Admission:  **None**        Plan:  Stroke alert   Neuro eval  Psyche consult

## 2019-09-04 NOTE — UTILIZATION REVIEW
Initial Clinical Review    Admission: Date/Time/Statement:   Orders Placed This Encounter   Procedures    Place in Observation     Standing Status:   Standing     Number of Occurrences:   1     Order Specific Question:   Admitting Physician     Answer:   Chu Kirk     Order Specific Question:   Level of Care     Answer:   Med Surg [[id number]]     ED Arrival Information     Expected Arrival Acuity Means of Arrival Escorted By Service Admission Type    - 9/3/2019 08:33 Urgent Ambulance [facility] Urgent    Arrival Complaint    weakness     Chief Complaint   Patient presents with    Weakness - Generalized     feeling weak since sunday  states she is having trouble opening her eyes  History of Present Illness:   59 year patient was relatively comfortable until last Saturday  She started feeling weak on Sunday  Yesterday she felt even weaker  To-day she tried to do her routine work, could not do it  Called ambulance and came to ER  She was evaluated by ER physician and is now being admitted for further evaluation, management and treatment  This symptoms are ongoing with varying severity for last 2 months  During this time, she was evaluated by Neurologist  Had MRI done recently  Also was seen by endocrinologist for " underactive Thyroid problems  She says she is not taking amytryptaline for fear of side effects      Review of Systems   Constitutional: Positive for activity change, appetite change and fatigue  Musculoskeletal: Generalized weakness   Skin: Positive for pallor  Assessment/Plan:   Assessment:  1  Generalized Weakness   2  ADL dysfunction   3  Sinus tenderness   4  Hypothyroidism   5  Fibromyalgia   6  GERD     Plan:  OBSERVATION  NEUROLOGY CONSULT    RN Progress Notes   Date of Service:  9/4/2019 10:07 AM   RAPID RESPONSE    Called rapid response for the patient since an evident facial droop was noted on the  Her    Patient on assessment was Alert and oriented but unable to open her eyes for me  Her  was weak and patient was here for generalized weakness  Order for a stat CT of the head being done and will get EKG after she comes back from the procedure  Last BP was 168/89 and   Neurology also consulted       ED Triage Vitals   Temperature Pulse Respirations Blood Pressure SpO2   09/03/19 0843 09/03/19 0843 09/03/19 0843 09/03/19 0843 09/03/19 0843   (!) 97 4 °F (36 3 °C) 96 22 146/77 98 %      Temp Source Heart Rate Source Patient Position - Orthostatic VS BP Location FiO2 (%)   09/03/19 1700 09/03/19 1245 09/03/19 1700 09/03/19 1700 --   Oral Monitor Lying Left arm       Pain Score       09/03/19 0843       7        Wt Readings from Last 1 Encounters:   09/04/19 74 8 kg (165 lb)     Additional Vital Signs:   /19 09:42:04  97 6 °F (36 4 °C)  98  18  132/79  97  99 %    09/04/19 03:56:12  97 7 °F (36 5 °C)  70  18  108/68  81  99 %    09/04/19 00:26:53  97 6 °F (36 4 °C)  80  18  110/70  83  100 %    09/03/19 1844    75  19      100 %    09/03/19 18:34:30  97 7 °F (36 5 °C)  73  18  143/90  108  100 %    09/03/19 1700  97 4 °F (36 3 °C)Abnormal   75  16  136/83          Pertinent Labs/Diagnostic Test Results:   Results from last 7 days   Lab Units 09/03/19  0902   WBC Thousand/uL 6 00   HEMOGLOBIN g/dL 13 4   HEMATOCRIT % 41 8   PLATELETS Thousands/uL 242   NEUTROS ABS Thousands/µL 3 95     Results from last 7 days   Lab Units 09/04/19  0536 09/03/19  0902   SODIUM mmol/L 140 140   POTASSIUM mmol/L 4 7 4 0   CHLORIDE mmol/L 106 104   CO2 mmol/L 27 27   ANION GAP mmol/L 7 9   BUN mg/dL 11 17   CREATININE mg/dL 0 74 0 93   EGFR ml/min/1 73sq m 87 66   CALCIUM mg/dL 7 9* 8 9   MAGNESIUM mg/dL 1 9 2 0   PHOSPHORUS mg/dL 3 5 2 0*     Results from last 7 days   Lab Units 09/04/19  0536 09/03/19  0902   AST U/L 28 20   ALT U/L 20 28   ALK PHOS U/L 65 79   TOTAL PROTEIN g/dL 6 0* 7 1   ALBUMIN g/dL 2 7* 3 4*   TOTAL BILIRUBIN mg/dL 0 30 0 20     Results from last 7 days   Lab Units 09/04/19  0536 09/03/19  0902   GLUCOSE RANDOM mg/dL 105 97     Results from last 7 days   Lab Units 09/03/19  0902   TROPONIN I ng/mL <0 02     Results from last 7 days   Lab Units 09/03/19  0902   PROTIME seconds 10 1   INR  0 94   PTT seconds 26     Results from last 7 days   Lab Units 09/03/19  0902   LIPASE u/L 72*     Results from last 7 days   Lab Units 09/03/19  1552 09/03/19  0902   CRP mg/L  --  7 7*   SED RATE mm/hour 21  --      Results from last 7 days   Lab Units 09/03/19  1115   CLARITY UA  Clear   COLOR UA  Light Yellow   SPEC GRAV UA  1 010   PH UA  8 0   GLUCOSE UA mg/dl Negative   KETONES UA mg/dl Negative   BLOOD UA  Negative   PROTEIN UA mg/dl Negative   NITRITE UA  Negative   BILIRUBIN UA  Negative   UROBILINOGEN UA E U /dl 0 2   LEUKOCYTES UA  Negative     ABDOMINAL X RAY -  There is a nonobstructive bowel gas pattern   Large amount of retained stool throughout the colon      ED Treatment:   Medication Administration from 09/03/2019 0833 to 09/03/2019 1727       Date/Time Order Dose Route Action     09/03/2019 1003 sodium chloride 0 9 % bolus 1,000 mL 0 mL Intravenous Stopped     09/03/2019 0903 sodium chloride 0 9 % bolus 1,000 mL 1,000 mL Intravenous New Bag     09/03/2019 1326 sodium chloride 0 9 % bolus 1,000 mL 0 mL Intravenous Stopped     09/03/2019 1035 sodium chloride 0 9 % bolus 1,000 mL 1,000 mL Intravenous New Bag     09/03/2019 1110 ondansetron (ZOFRAN) injection 4 mg 4 mg Intravenous Given     Past Medical History:   Diagnosis Date    Anesthesia complication     difficult to wake up    Disease of thyroid gland     Fibromyalgia     GERD (gastroesophageal reflux disease)     Lazy eye     resolved: 3/27/17    Osteopenia     with joint pain-elevated DEV    Tinnitus     Wears glasses     for reading     Present on Admission:  **None**    Admitting Diagnosis: Weakness [R53 1]  Generalized weakness [R53 1]    Age/Sex: 59 y o  female    Admission Orders:  VS + NEURO CHECKS Q1-4 HRS  ORTHOSTATIC BP X 1  NIH STROKE SCALE   CONTINUOUS PULSE OXIMETRY  PT + OT EVALS    IP CONSULT TO NEUROLOGY 9/4/19  *CT BRAIN 9/4/19    Current Facility-Administered Medications:  acetaminophen 650 mg Oral Q8H PRN   acetaminophen 650 mg Oral Q6H PRN   ALPRAZolam 0 5 mg Oral BID PRN   calcium carbonate 1 tablet Oral BID With Meals   cefTRIAXone 1,000 mg Intravenous Q24H   dextrose 5 % and sodium chloride 0 45 % with KCl 20 mEq/L 75 mL/hr Intravenous Continuous   DULoxetine 30 mg Oral Daily   enoxaparin 40 mg Subcutaneous BID   gabapentin 300 mg Oral TID   levothyroxine 25 mcg Oral Early Morning   magnesium oxide 400 mg Oral Daily   pantoprazole 40 mg Oral Early Morning   pneumococcal 13-valent conjugate vaccine 0 5 mL Intramuscular Prior to discharge     Network Utilization Review Department  Phone: [phone]; Fax [phone]  [email or web address]  ATTENTION: Please call with any questions or concerns to [phone]  and carefully listen to the prompts so that you are directed to the right person  Send all requests for admission clinical reviews, approved or denied determinations and any other requests to fax [phone]   All voicemails are confidential

## 2019-09-04 NOTE — RAPID RESPONSE
Progress Note - Rapid Response   Alessandra Chaudhari 59 y o  female MRN: [id number]    Time Called ( Time): 1001  Date Called: 9/4/2019  Level of Care: MS  Room#: 778  IMIFGSX Time ( Time): 5841  Event End Time ( Time): 7646  Primary reason for call: Other Right "facial droop"  Interventions:  Airway/Breathing:  No Intervention  Circulation: N/A  Other Treatments: N/A       Assessment:   1  Psychogenic event    Plan:   · Stat CTH/ CTA Head performed, stoke alert called  NIH complete=1; Pt does not appear to be having an acute neuro event  Spoke to neurology and recommend psych c/s; Pt primary Dr Manuela Delgadillo was notified of findings       HPI/Chief Complaint (Background/Situation):   Alessandra Chaudhari is a 59y o  year old female who was noted to have a right "facial droop" per nursing  RR called and upon arrival pt was found lying in bed and refusing to open her eyes and speaking from the left side of her mouth  When asked to open her eyes she said "I can't"  When attempted to open her eye to perform exam she was forcefully keeping them closed  When eyes were eventually opened she was looking in different directions  She was able to follow all commands, taken to CT scan where she was witnessed at rest with no noticeable facial droop present  When she would speak she would speak from the left side of her mouth  When asked to open her mouth she was forcefully keeping it closed  After she finally opened her mouth her tongue was midline  No right facial weakness was noted       Historical Information   Past Medical History:   Diagnosis Date    Anesthesia complication     difficult to wake up    Disease of thyroid gland     Fibromyalgia     GERD (gastroesophageal reflux disease)     Lazy eye     resolved: 3/27/17    Osteopenia     with joint pain-elevated DEV    Tinnitus     Wears glasses     for reading     Past Surgical History:   Procedure Laterality Date    ABDOMINAL SURGERY      lysis of adhesions x 2    APPENDECTOMY      CERVICAL FUSION      CHOLECYSTECTOMY      open    DILATION AND CURETTAGE OF UTERUS      NEUROMA EXCISION Right 5/26/2017    Procedure: EXCISION MASS / FIBROMA FOOT;  Surgeon: Gisela Pritchard DPM;  Location: [address] [place] [address];  Service:     RIGHT OOPHORECTOMY      WISDOM TOOTH EXTRACTION      x4     Social History   Social History     Substance and Sexual Activity   Alcohol Use Not Currently    Frequency: Never    Binge frequency: Never     Social History     Substance and Sexual Activity   Drug Use No     Social History     Tobacco Use   Smoking Status Never Smoker   Smokeless Tobacco Never Used     Family History: non-contributory    Meds/Allergies     Current Facility-Administered Medications:  acetaminophen 650 mg Oral Q8H PRN Kaela Mendoza MD    acetaminophen 650 mg Oral Q6H PRN Kaela Mendoza MD    ALPRAZolam 0 5 mg Oral BID PRN Kaela Mendoza MD    calcium carbonate 1 tablet Oral BID With Meals Kaela Mendoza MD    cefTRIAXone 1,000 mg Intravenous Q24H Kaela Mendoza MD Last Rate: 1,000 mg (09/03/19 1945)   dextrose 5 % and sodium chloride 0 45 % with KCl 20 mEq/L 75 mL/hr Intravenous Continuous Kaela Mendoza MD Last Rate: 75 mL/hr (09/03/19 1943)   DULoxetine 30 mg Oral Daily Kaela Mendoza MD    enoxaparin 40 mg Subcutaneous BID Kaela Mendoza MD    gabapentin 300 mg Oral TID Kaela Mendoza MD    levothyroxine 25 mcg Oral Early Morning Kaela Mendoza MD    magnesium oxide 400 mg Oral Daily Kaela Mendoza MD    pantoprazole 40 mg Oral Early Morning Kaela Mendoza MD    pneumococcal 13-valent conjugate vaccine 0 5 mL Intramuscular Prior to discharge Vijay Martins MD          dextrose 5 % and sodium chloride 0 45 % with KCl 20 mEq/L 75 mL/hr Last Rate: 75 mL/hr (09/03/19 1943)       Allergies   Allergen Reactions    Demerol [Meperidine] Lightheadedness     Reaction Date: 11Jan2006;     Sulfa Antibiotics Hives Reaction Date: 11Jan2006;        ROS: Review of Systems   Constitutional: Negative  HENT: Negative  Eyes:        States she can't open them   Respiratory: Negative  Cardiovascular: Negative  Gastrointestinal: Negative  Genitourinary: Negative  Musculoskeletal: Negative  Skin: Negative  Neurological: Positive for facial asymmetry  Negative for speech difficulty and weakness  Hematological: Negative  Psychiatric/Behavioral: Negative  Vitals: 97 6- 110- 16- 162/82    Physical Exam:  Physical Exam   Constitutional: She is oriented to person, place, and time  She appears well-developed and well-nourished  HENT:   Head: Normocephalic and atraumatic  Eyes: Pupils are equal, round, and reactive to light  EOM are normal    forcefully closing eyes   Neck: Normal range of motion  Cardiovascular: Regular rhythm  Tachycardia present  Pulmonary/Chest: Effort normal and breath sounds normal  No respiratory distress  Abdominal: Soft  Bowel sounds are normal  She exhibits no distension  There is no tenderness  Musculoskeletal: Normal range of motion  She exhibits no edema  Neurological: She is alert and oriented to person, place, and time  Forced facial asymettry when speaking but at rest    Skin: Skin is warm and dry  Capillary refill takes less than 2 seconds  She is not diaphoretic  Intake/Output Summary (Last 24 hours) at 9/4/2019 1151  Last data filed at 9/4/2019 0601  Gross per 24 hour   Intake 1000 ml   Output 550 ml   Net 450 ml       Respiratory    Lab Data (Last 4 hours)    None         O2/Vent Data (Last 4 hours)    None              Invasive Devices     Peripheral Intravenous Line            Peripheral IV 09/04/19 Left Arm less than 1 day                DIAGNOSTIC DATA:    Lab: I have personally reviewed pertinent lab results     CBC:   Results from last 7 days   Lab Units 09/03/19  0902   WBC Thousand/uL 6 00   HEMOGLOBIN g/dL 13 4   HEMATOCRIT % 41 8 PLATELETS Thousands/uL 242     CMP:   Results from last 7 days   Lab Units 09/04/19  0536 09/03/19  0902   POTASSIUM mmol/L 4 7 4 0   CHLORIDE mmol/L 106 104   CO2 mmol/L 27 27   BUN mg/dL 11 17   CREATININE mg/dL 0 74 0 93   CALCIUM mg/dL 7 9* 8 9   ALK PHOS U/L 65 79   ALT U/L 20 28   AST U/L 28 20     PT/INR:   No results found for: PT, INR,   Magnesium: No components found for: MAG,   Phosphorous:   Lab Results   Component Value Date    PHOS 3 5 09/04/2019       Microbiology:  No results found for: Ray Meuse, SPUTUMCULTUR      OUTCOME:   Stayed in room   Family notified of transfer: no  Family member contacted: none  Code Status: Level 1 - Full Code  Critical Care Time: Total Critical Care time spent 29 minutes excluding procedures, teaching and family updates

## 2019-09-04 NOTE — RESPIRATORY THERAPY NOTE
Called to rapid response for facial droop  No respiratory distress noted  RT at bedside    Spo2 = 98 % on RA at rest   EP

## 2019-09-04 NOTE — PHYSICAL THERAPY NOTE
PT EVALUATION       09/04/19 1510   Note Type   Note type Eval/Treat   Pain Assessment   Pain Assessment 0-10   Pain Score 8   Pain Type Acute pain   Pain Location Head  (headache)   Home Living   Type of Home House  (1 RODOLFO with rail to porch and 1 RODOLFO into house)   Home Layout One level; Laundry in basement   Bathroom Shower/Tub Tub/shower unit   [employer]  (RW)   Prior Function   Level of [place] Independent with ADLs and functional mobility  (pt amb w/out AD PTA)   Lives With Spouse   Receives Help From Family   ADL Assistance Independent   IADLs Independent   Vocational Part time employment   Restrictions/Precautions   Other Precautions Pain; Fall Risk;Bed Alarm; Chair Alarm  (pt unable to open her eyes due to weakness)   General   Additional Pertinent History Pt adm with weakness  Family/Caregiver Present No   Cognition   Overall Cognitive Status WFL   Arousal/Participation Cooperative   Orientation Level Oriented X4   Following Commands Follows all commands and directions without difficulty   RLE Assessment   RLE Assessment WFL  (hip 3/5, knee 3+/5, ankle 3/5)   LLE Assessment   LLE Assessment WFL  (hip and knee 2/5; ankle 2-/5)   Bed Mobility   Rolling R 3  Moderate assistance   Additional items Assist x 1;Verbal cues; Bedrails   Rolling L 4  Minimal assistance   Additional items Assist x 1;Verbal cues; Bedrails   Supine to Sit 4  Minimal assistance   Additional items Assist x 1;LE management;Verbal cues   Sit to Supine 3  Moderate assistance   Additional items Assist x 1;LE management;Verbal cues   Transfers   Sit to Stand 4  Minimal assistance   Additional items Assist x 1;Verbal cues   Stand to Sit 4  Minimal assistance   Additional items Assist x 1;Verbal cues   Ambulation/Elevation   Gait Assistance 4  Minimal assist   Additional items Assist x 1;Verbal cues; Tactile cues   Assistive Device Rolling walker   Distance Pt stood with RW x 1 minute   Pt became dizzy and returned to seated in bed  Balance   Static Sitting Fair   Static Standing Fair -  (w RW)   Activity Tolerance   Activity Tolerance Patient limited by fatigue;Patient limited by pain  (and weakness)   Assessment   Prognosis Good   Problem List Decreased strength;Decreased range of motion;Decreased endurance; Impaired balance;Decreased mobility; Decreased coordination;Decreased safety awareness;Pain   Assessment Patient seen for Physical Therapy evaluation  Patient admitted with Psychogenic weakness  Comorbidities affecting patient's physical performance include: Alcohol weakness, arthropathy, IBS, osteopenia, lesion of plantar nerves bilat, radiculopathy of lumbar region, fibromyalgia  Personal factors affecting patient at time of initial evaluation include: lives in one story house, stairs to enter home, inability to ambulate household distances, inability to navigate community distances, inability to navigate level surfaces without external assistance, inability to perform dynamic tasks in community, anxiety, inability to perform ADLS, inability to perform IADLS  and inability to live alone  Prior to admission, patient was independent with functional mobility without assistive device, independent with ADLS, independent with IADLS, living with spouse in a one level home with 1+1 steps to enter, ambulating household distance, ambulating community distances and works part time  Please find objective findings from Physical Therapy assessment regarding body systems outlined above with impairments and limitations including weakness, decreased ROM, impaired balance, decreased endurance, impaired coordination, gait deviations, pain, decreased activity tolerance, decreased functional mobility tolerance, decreased safety awareness, impaired judgement, fall risk and SOB upon exertion    The Barthel Index was used as a functional outcome tool presenting with a score of 40 today indicating marked limitations of functional mobility and ADLS  Patient's clinical presentation is currently unstable/unpredictable as seen in patient's presentation of vital sign response, changing level of pain, increased fall risk, new onset of impairment of functional mobility, decreased endurance and new onset of weakness  Pt would benefit from continued Physical Therapy treatment to address deficits as defined above and maximize level of functional mobility  As demonstrated by objective findings, the assigned level of complexity for this evaluation is high  Goals   Patient Goals go home   STG Expiration Date 09/11/19   Short Term Goal #1 bed mob - S; trans - S   Short Term Goal #2 pt will amb with RW functional household distances - S; balance with RW - F/F+ for safe mobility and to decrease fall risk; up/down 2 steps - min A so pt can enter/exit her house   LTG Expiration Date 09/18/19   Long Term Goal #1 bed mob - I; trans - I; up/down full flight of steps - I so pt can access all areas of her home   Long Term Goal #2 pt will amb with RW functional household and community distances - I; balance with RW - F+/G for safe mobility and to decrease fall risk; increase strength LEs by 1/2 grade all deficit areas   Plan   Treatment/Interventions ADL retraining;Functional transfer training;LE strengthening/ROM; Elevations; Therapeutic exercise; Endurance training;Patient/family training;Equipment eval/education; Bed mobility;Gait training; Compensatory technique education   PT Frequency Once a day   Recommendation   Recommendation Short-term skilled PT  (vs home PT pending progress)   Equipment Recommended   (pt has a RW and cane)   Modified McCook Scale   Modified McCook Scale 4   Barthel Index   Feeding 5   Bathing 0   Grooming Score 0   Dressing Score 0   Bladder Score 10   Bowels Score 10   Toilet Use Score 5   Transfers (Bed/Chair) Score 10   Mobility (Level Surface) Score 0   Stairs Score 0   Barthel Index Score 40   Licensure   [place] License Number Anneliese Monsalve, PT [id number]     Time In:1510  Time Out:1525  Total Time: 15 mins      S:  "I really want to find out what's wrong and go home "  O:  Pt trans sit to stand with min A  Pt amb 5 steps with RW to head of bed, then 5 steps with RW to foot of bed, and then 5 steps back to head of bed with RW  Pt reports SOB with gait and mobility  Pt trans stand to sit with min A and returned to supine in bed with mod A for LE management  A:  Pt will benefit from cont skilled PT services to increase pt's strength and mobility  P:  Cont per PT POC  DCP - short term skilled PT vs home PT pending progress  Cont amb with RW      Angela Arellano   [id number]

## 2019-09-04 NOTE — CONSULTS
Consultation - William Millan 59 y o  female MRN: [id number]    Unit/Bed#: 2 [facility] Encounter: [id number]      Identifying Data:  59years old white female is admitted at [facility] on September 3, 2019 for severe weakness and could not do her routine work at home she was all along and she has been getting weaker and weaker over the week but last couple of days she is getting worse and she called the ambulance she was admitted for the same  Psychiatric consultation is asked for the depression  Patient examined spoke with the nurse history physical medications labs reviewed and noted  Patient is a good historian she reports that she has been suffering from the depression and she had seen me previously about a year ago  She has been getting Cymbalta and Xanax p r n  From family physician Dr Sudeep Winkler  Patient lives with the  she has no children she denies smoking and she has no history of smoking but she gives a history of alcohol abuse 23 years ago she had been sober since then she had 1 rehab she had tried pot many years ago otherwise she has no history of drug abuse no IV drug abuse  Currently patient works part-time at [employer] as a  she likes her job  Patient is also suffering from migraine and she is seeing a neurologist Dr Arthur Sepulveda  Patient had MRI done recently and it was normal   Patient is concerned about puffiness on her face and below both eyes  Patient was treated with Elavil by the neurologist but it was discontinued 2 days ago  Patient is also taking Neurontin from the her rheumatologist for neuropathy  Patient has college education in sociology    Patient is suffering from depression anxiety GERD lazy eye osteopenia tinnitus weakness history of abdominal surgery appendectomy cervical fusion cholecystectomy D and C neuroma excision with him tooth extraction right oophorectomy patient is on Cymbalta 30 mg daily and Xanax 0 5 mg b i d  P r n  but she hardly use at home she is allergic to Demerol and sulfa    Chief Complaints:  Depression    Family History:  Sister has depression and she is suffering from Luite Jairon 88  Family History   Problem Relation Age of Onset    Heart disease Mother     Stroke Mother 58    COPD Mother     Arthritis Mother     Hypertension Father     Kidney disease Brother         kidney transplant    No Known Problems Sister     No Known Problems Maternal Aunt     No Known Problems Maternal Uncle     No Known Problems Paternal Aunt     No Known Problems Paternal Uncle     No Known Problems Maternal Grandmother     No Known Problems Maternal Grandfather     No Known Problems Paternal Grandmother     No Known Problems Paternal Grandfather     ADD / ADHD Neg Hx     Anesthesia problems Neg Hx     Cancer Neg Hx     Clotting disorder Neg Hx     Collagen disease Neg Hx     Diabetes Neg Hx     Dislocations Neg Hx     Learning disabilities Neg Hx     Neurological problems Neg Hx     Osteoporosis Neg Hx     Rheumatologic disease Neg Hx     Scoliosis Neg Hx     Vascular Disease Neg Hx        Legal History:  Patient denies    Mental Status Exam:  59years old white female is alert awake oriented cooperative feeling very weak flat said depressed memory intact psychomotor retardation speech slow and low in tone  Poor sleep poor appetite somatic complaints severe weakness  Patient denies auditory or visual hallucination  Patient denies suicidal or homicidal ideation  No paranoia and no delusion elucidated  Poor concentration  Insight and judgments are adequate        History of Present Illness     HPI: Laurann Kanner is a 59y o  year old female who presents with generalized weakness and no energy    Consults      Historical Information   Past Psychiatric History:  59years old white female is alert awake cooperative able to give out the basic background history patient has history of depression and she had seen me in the past last she was seen in last September by me  Patient has been taking antidepressant medication from family physician  Patient has no history of psychiatric admissions no history of suicide attempt she has history of alcohol abuse in the past with 1 rehab but no DUI and she has been sober since 20/3 years  Patient has remote history of trying pot many years ago otherwise he has no history of drug abuse no IV drug abuse and no legal problems in the past   After the discussion with the patient I decided to continue her Cymbalta 30 mg daily while medical workup is in progress I will not increase the medicine to avoid the sedation and increase the weakness  Patient agreed      Past Medical History:   Diagnosis Date    Anesthesia complication     difficult to wake up    Disease of thyroid gland     Fibromyalgia     GERD (gastroesophageal reflux disease)     Lazy eye     resolved: 3/27/17    Osteopenia     with joint pain-elevated DEV    Tinnitus     Wears glasses     for reading     Past Surgical History:   Procedure Laterality Date    ABDOMINAL SURGERY      lysis of adhesions x 2    APPENDECTOMY      CERVICAL FUSION      CHOLECYSTECTOMY      open    DILATION AND CURETTAGE OF UTERUS      NEUROMA EXCISION Right 5/26/2017    Procedure: EXCISION MASS / FIBROMA FOOT;  Surgeon: Simón Rogers DPM;  Location: [address] [place] [address];  Service:     RIGHT OOPHORECTOMY      WISDOM TOOTH EXTRACTION      x4     Social History   Social History     Substance and Sexual Activity   Alcohol Use Not Currently    Frequency: Never    Binge frequency: Never     Social History     Substance and Sexual Activity   Drug Use No     Social History     Tobacco Use   Smoking Status Never Smoker   Smokeless Tobacco Never Used       Meds/Allergies   current meds:   Current Facility-Administered Medications   Medication Dose Route Frequency    acetaminophen (TYLENOL) tablet 650 mg  650 mg Oral Q8H PRN    acetaminophen (TYLENOL) tablet 650 mg  650 mg Oral Q6H PRN    ALPRAZolam (XANAX) tablet 0 5 mg  0 5 mg Oral BID PRN    calcium carbonate (OYSTER SHELL,OSCAL) 500 mg tablet 1 tablet  1 tablet Oral BID With Meals    cefTRIAXone (ROCEPHIN) IVPB (premix) 1,000 mg  1,000 mg Intravenous Q24H    dextrose 5 % and sodium chloride 0 45 % with KCl 20 mEq/L infusion  75 mL/hr Intravenous Continuous    DULoxetine (CYMBALTA) delayed release capsule 30 mg  30 mg Oral Daily    enoxaparin (LOVENOX) subcutaneous injection 40 mg  40 mg Subcutaneous BID    gabapentin (NEURONTIN) capsule 300 mg  300 mg Oral TID    levothyroxine tablet 25 mcg  25 mcg Oral Early Morning    magnesium oxide (MAG-OX) tablet 400 mg  400 mg Oral Daily    pantoprazole (PROTONIX) EC tablet 40 mg  40 mg Oral Early Morning    pneumococcal 13-valent conjugate vaccine (PREVNAR-13) IM injection 0 5 mL  0 5 mL Intramuscular Prior to discharge    and PTA meds:    Medications Prior to Admission   Medication    acetaminophen (TYLENOL) 325 mg tablet    ALPRAZolam (XANAX) 0 5 mg tablet    amitriptyline (ELAVIL) 25 mg tablet    brimonidine (ALPHAGAN P) 0 1 %    calcium carbonate 1250 MG capsule    diclofenac sodium (VOLTAREN) 1 %    DULoxetine (CYMBALTA) 30 mg delayed release capsule    esomeprazole (NexIUM) 40 MG capsule    gabapentin (NEURONTIN) 300 mg capsule    levothyroxine 25 mcg tablet    magnesium oxide (MAG-OX) 400 mg    methocarbamol (ROBAXIN) 500 mg tablet     Allergies   Allergen Reactions    Demerol [Meperidine] Lightheadedness     Reaction Date: 11Jan2006;     Sulfa Antibiotics Hives     Reaction Date: 11Jan2006;        Objective   Vitals: Blood pressure 113/71, pulse 82, temperature 97 6 °F (36 4 °C), resp  rate 18, height 5' 4" (1 626 m), weight 74 8 kg (165 lb), SpO2 97 %        Routine Lab Results:   Admission on 09/03/2019   Component Date Value Ref Range Status    WBC 09/03/2019 6 00  4 31 - 10 16 Thousand/uL Final    RBC 09/03/2019 4 90  3 81 - 5 12 Million/uL Final    Hemoglobin 09/03/2019 13 4  11 5 - 15 4 g/dL Final    Hematocrit 09/03/2019 41 8  34 8 - 46 1 % Final    MCV 09/03/2019 85  82 - 98 fL Final    MCH 09/03/2019 27 3  26 8 - 34 3 pg Final    MCHC 09/03/2019 32 1  31 4 - 37 4 g/dL Final    RDW 09/03/2019 13 4  11 6 - 15 1 % Final    MPV 09/03/2019 10 4  8 9 - 12 7 fL Final    Platelets 53/91/1433 242  149 - 390 Thousands/uL Final    nRBC 09/03/2019 0  /100 WBCs Final    Neutrophils Relative 09/03/2019 65  43 - 75 % Final    Immat GRANS % 09/03/2019 0  0 - 2 % Final    Lymphocytes Relative 09/03/2019 21  14 - 44 % Final    Monocytes Relative 09/03/2019 9  4 - 12 % Final    Eosinophils Relative 09/03/2019 4  0 - 6 % Final    Basophils Relative 09/03/2019 1  0 - 1 % Final    Neutrophils Absolute 09/03/2019 3 95  1 85 - 7 62 Thousands/µL Final    Immature Grans Absolute 09/03/2019 0 02  0 00 - 0 20 Thousand/uL Final    Lymphocytes Absolute 09/03/2019 1 26  0 60 - 4 47 Thousands/µL Final    Monocytes Absolute 09/03/2019 0 53  0 17 - 1 22 Thousand/µL Final    Eosinophils Absolute 09/03/2019 0 21  0 00 - 0 61 Thousand/µL Final    Basophils Absolute 09/03/2019 0 03  0 00 - 0 10 Thousands/µL Final    Protime 09/03/2019 10 1  9 8 - 12 0 seconds Final    INR 09/03/2019 0 94  0 91 - 1 09 Final    PTT 09/03/2019 26  25 - 32 seconds Final    Therapeutic Heparin Range =  60-90 seconds    Sodium 09/03/2019 140  136 - 145 mmol/L Final    Potassium 09/03/2019 4 0  3 5 - 5 3 mmol/L Final    Chloride 09/03/2019 104  100 - 108 mmol/L Final    CO2 09/03/2019 27  21 - 32 mmol/L Final    ANION GAP 09/03/2019 9  4 - 13 mmol/L Final    BUN 09/03/2019 17  5 - 25 mg/dL Final    Creatinine 09/03/2019 0 93  0 60 - 1 30 mg/dL Final    Standardized to IDMS reference method    Glucose 09/03/2019 97  65 - 140 mg/dL Final      If the patient is fasting, the ADA then defines impaired fasting glucose as > 100 mg/dL and diabetes as > or equal to 123 mg/dL    Specimen collection should occur prior to Sulfasalazine administration due to the potential for falsely depressed results  Specimen collection should occur prior to Sulfapyridine administration due to the potential for falsely elevated results   Calcium 09/03/2019 8 9  8 3 - 10 1 mg/dL Final    AST 09/03/2019 20  5 - 45 U/L Final      Specimen collection should occur prior to Sulfasalazine administration due to the potential for falsely depressed results   ALT 09/03/2019 28  12 - 78 U/L Final      Specimen collection should occur prior to Sulfasalazine administration due to the potential for falsely depressed results   Alkaline Phosphatase 09/03/2019 79  46 - 116 U/L Final    Total Protein 09/03/2019 7 1  6 4 - 8 2 g/dL Final    Albumin 09/03/2019 3 4* 3 5 - 5 0 g/dL Final    Total Bilirubin 09/03/2019 0 20  0 20 - 1 00 mg/dL Final    eGFR 09/03/2019 66  ml/min/1 73sq m Final    TSH 3RD GENERATON 09/03/2019 3 404  0 358 - 3 740 uIU/mL Final      The recommended reference ranges for TSH during pregnancy are as follows:   First trimester 0 1 to 2 5 uIU/mL   Second trimester  0 2 to 3 0 uIU/mL   Third trimester 0 3 to 3 0 uIU/m    Note: Normal ranges may not apply to patients who are transgender, non-binary, or whose legal sex, sex at birth, and gender identity differ  Using supplements with high doses of biotin 20 to more than 300 times greater than the adequate daily intake for adults of 30 mcg/day as established by the Reading of Medicine, can cause falsely depress results      Phosphorus 09/03/2019 2 0* 2 3 - 4 1 mg/dL Final    Magnesium 09/03/2019 2 0  1 6 - 2 6 mg/dL Final    LACTIC ACID 09/03/2019 1 6  0 5 - 2 0 mmol/L Final    Color, UA 09/03/2019 Light Yellow   Final    Clarity, UA 09/03/2019 Clear   Final    Specific Gravity, UA 09/03/2019 1 010  1 000 - 1 030 Final    pH, UA 09/03/2019 8 0  5 0, 5 5, 6 0, 6 5, 7 0, 7 5, 8 0, 8 5, 9 0 Final    Leukocytes, UA 09/03/2019 Negative  Negative Final    Nitrite, UA 09/03/2019 Negative  Negative Final    Protein, UA 09/03/2019 Negative  Negative mg/dl Final    Glucose, UA 09/03/2019 Negative  Negative mg/dl Final    Ketones, UA 09/03/2019 Negative  Negative mg/dl Final    Urobilinogen, UA 09/03/2019 0 2  0 2, 1 0 E U /dl E U /dl Final    Bilirubin, UA 09/03/2019 Negative  Negative Final    Blood, UA 09/03/2019 Negative  Negative Final    Amph/Meth UR 09/03/2019 Negative  Negative Final    Barbiturate Ur 09/03/2019 Negative  Negative Final    Benzodiazepine Urine 09/03/2019 Negative  Negative Final    Cocaine Urine 09/03/2019 Negative  Negative Final    Methadone Urine 09/03/2019 Negative  Negative Final    Opiate Urine 09/03/2019 Negative  Negative Final    PCP Ur 09/03/2019 Negative  Negative Final    THC Urine 09/03/2019 Negative  Negative Final    Troponin I 09/03/2019 <0 02  <=0 04 ng/mL Final    Autovalidation override  [employer] Chemistry analyzer 99% cutoff is > 0 04 ng/mL in network labs     o cTnI 99% cutoff is useful only when applied to patients in the clinical setting of myocardial ischemia   o cTnI 99% cutoff should be interpreted in the context of clinical history, ECG findings and possibly cardiac imaging to establish correct diagnosis  o cTnI 99% cutoff may be suggestive but clearly not indicative of a coronary event without the clinical setting of myocardial ischemia        Lipase 09/03/2019 72* 73 - 393 u/L Final    Ventricular Rate 09/03/2019 96  BPM Final    Atrial Rate 09/03/2019 96  BPM Final    IN Interval 09/03/2019 168  ms Final    QRSD Interval 09/03/2019 90  ms Final    QT Interval 09/03/2019 364  ms Final    QTC Interval 09/03/2019 459  ms Final    P Axis 09/03/2019 53  degrees Final    QRS Axis 09/03/2019 -17  degrees Final    T Wave [place] 09/03/2019 33  degrees Final    Monotest 09/03/2019 Negative  Negative Final    Sed Rate 09/03/2019 21  2 - 25 mm/hour Final    CRP 09/03/2019 7 7* <3 0 mg/L Final  Sodium 09/04/2019 140  136 - 145 mmol/L Final    Potassium 09/04/2019 4 7  3 5 - 5 3 mmol/L Final    Slightly Hemolyzed; Results May be Affected    Chloride 09/04/2019 106  100 - 108 mmol/L Final    CO2 09/04/2019 27  21 - 32 mmol/L Final    ANION GAP 09/04/2019 7  4 - 13 mmol/L Final    BUN 09/04/2019 11  5 - 25 mg/dL Final    Creatinine 09/04/2019 0 74  0 60 - 1 30 mg/dL Final    Standardized to IDMS reference method    Glucose 09/04/2019 105  65 - 140 mg/dL Final      If the patient is fasting, the ADA then defines impaired fasting glucose as > 100 mg/dL and diabetes as > or equal to 123 mg/dL  Specimen collection should occur prior to Sulfasalazine administration due to the potential for falsely depressed results  Specimen collection should occur prior to Sulfapyridine administration due to the potential for falsely elevated results   Glucose, Fasting 09/04/2019 105* 65 - 99 mg/dL Final      Specimen collection should occur prior to Sulfasalazine administration due to the potential for falsely depressed results  Specimen collection should occur prior to Sulfapyridine administration due to the potential for falsely elevated results   Calcium 09/04/2019 7 9* 8 3 - 10 1 mg/dL Final    AST 09/04/2019 28  5 - 45 U/L Final    Slightly Hemolyzed; Results May be Affected  Specimen collection should occur prior to Sulfasalazine administration due to the potential for falsely depressed results   ALT 09/04/2019 20  12 - 78 U/L Final      Specimen collection should occur prior to Sulfasalazine administration due to the potential for falsely depressed results       Alkaline Phosphatase 09/04/2019 65  46 - 116 U/L Final    Total Protein 09/04/2019 6 0* 6 4 - 8 2 g/dL Final    Albumin 09/04/2019 2 7* 3 5 - 5 0 g/dL Final    Total Bilirubin 09/04/2019 0 30  0 20 - 1 00 mg/dL Final    eGFR 09/04/2019 87  ml/min/1 73sq m Final    Magnesium 09/04/2019 1 9  1 6 - 2 6 mg/dL Final    Phosphorus 09/04/2019 3 5  2 3 - 4 1 mg/dL Final    POC Glucose 09/04/2019 146* 65 - 140 mg/dl Final         Diagnosis:  Major depression recurrent  Anxiety  History of alcohol abuse  Plan:  Cymbalta 30 mg daily  Continue Xanax 0 5 mg p  O  B i d  P r n  For anxiety at this time  Psychotherapy  Psychiatric follow-up recommended after the discharge  I will follow up        Diane Alan MD

## 2019-09-04 NOTE — TELEMEDICINE
TeleConsultation - Neurology   Ren Luque 59 y o  female MRN: [id number]   Encounter: [id number]      REQUIRED DOCUMENTATION:     1  This service was provided via Telemedicine  2  Provider located at home  3  TeleMed provider: Bird Tirado MD   4  Identify all parties in room with patient during tele consult:  RN  5  After connecting through Zoomingo, patient was identified by name and date of birth and assistant checked wristband  Patient was then informed that this was a Telemedicine visit and that the exam was being conducted confidentially over secure lines  My office door was closed  No one else was in the room  Patient acknowledged consent and understanding of privacy and security of the Telemedicine visit, and gave us permission to have the assistant stay in the room in order to assist with the history and to conduct the exam   I informed the patient that I have reviewed their record in Epic and presented the opportunity for them to ask any questions regarding the visit today  The patient agreed to participate  Assessment/Plan   Forceful eyelid closing, and forceful mouth deviation with right sided weakness:  Normal CT head and CTA  Psychogenic origin  Patient had numerous admission and stroke alerts in the past, and she received tPA at one point  She has documented discrepancies in clinical examination by multiple providers  We recommend follow-up with psych    History of Present Illness     Reason for Consult / Principal Problem: right sided weakness  Hx and PE limited by: not cooperative  HPI: Ren Luque is a 59 y o  female who presents with generalized weakness  She had sudden onset right sided weakness this morning, along with forced mouth deviation  She also had her eyes closed stating that she cannot open them  Patient had unilateral weaknesses in the past and had multiple stroke alerts activated on her  She received tPA   Work-up has been normal     Inpatient consult to Neurology  Consult performed by: Linn Stein MD  Consult ordered by: SHRAVAN Arenas          Review of Systems  Complete ROS was done and is negative other than what is mentioned in HPI    Historical Information   Past Medical History:   Diagnosis Date    Anesthesia complication     difficult to wake up    Disease of thyroid gland     Fibromyalgia     GERD (gastroesophageal reflux disease)     Lazy eye     resolved: 3/27/17    Osteopenia     with joint pain-elevated DEV    Tinnitus     Wears glasses     for reading     Past Surgical History:   Procedure Laterality Date    ABDOMINAL SURGERY      lysis of adhesions x 2    APPENDECTOMY      CERVICAL FUSION      CHOLECYSTECTOMY      open    DILATION AND CURETTAGE OF UTERUS      NEUROMA EXCISION Right 5/26/2017    Procedure: EXCISION MASS / FIBROMA FOOT;  Surgeon: Denisse Donnelly DPM;  Location: [address] [place] [address];  Service:     RIGHT OOPHORECTOMY      WISDOM TOOTH EXTRACTION      x4     Social History   Social History     Substance and Sexual Activity   Alcohol Use Not Currently    Frequency: Never    Binge frequency: Never     Social History     Substance and Sexual Activity   Drug Use No     Social History     Tobacco Use   Smoking Status Never Smoker   Smokeless Tobacco Never Used     Family History: non-contributory    Review of previous medical records was completed  Meds/Allergies   PTA meds:   Prior to Admission Medications   Prescriptions Last Dose Informant Patient Reported? Taking?    ALPRAZolam (XANAX) 0 5 mg tablet   Yes No   Sig: Take by mouth 3 (three) times a day as needed for anxiety   DULoxetine (CYMBALTA) 30 mg delayed release capsule Not Taking at 08/26/19  Yes No   Sig: Take 30 mg by mouth daily    acetaminophen (TYLENOL) 325 mg tablet   No No   Sig: Take 2 tablets (650 mg total) by mouth every 8 (eight) hours as needed for mild pain or headaches   amitriptyline (ELAVIL) 25 mg tablet  Self No No   Sig: Take 1 tab at bedtime for one week and if tolerated take 2 tabs at bedtime  Patient taking differently: Take 50 mg by mouth daily at bedtime    brimonidine (ALPHAGAN P) 0 1 %   Yes No   Sig: Administer 1 drop to both eyes once    calcium carbonate 1250 MG capsule   Yes No   Sig: Take 1,250 mg by mouth daily    diclofenac sodium (VOLTAREN) 1 %   No No   Sig: Apply 2 g topically 4 (four) times a day   Patient taking differently: Apply 2 g topically as needed    esomeprazole (NexIUM) 40 MG capsule   Yes No   Sig: Take 40 mg by mouth daily   gabapentin (NEURONTIN) 300 mg capsule   Yes No   Sig: Take 300 mg by mouth 3 (three) times a day    levothyroxine 25 mcg tablet   Yes No   Sig: Take 25 mcg by mouth daily   magnesium oxide (MAG-OX) 400 mg   No No   Sig: Take 1 tablet (400 mg total) by mouth daily   Patient not taking: Reported on 8/27/2019   methocarbamol (ROBAXIN) 500 mg tablet   No No   Sig: Take 1 tablet (500 mg total) by mouth 2 (two) times a day   Patient not taking: Reported on 8/27/2019      Facility-Administered Medications: None       Allergies   Allergen Reactions    Demerol [Meperidine] Lightheadedness     Reaction Date: 11Jan2006;     Sulfa Antibiotics Hives     Reaction Date: 11Jan2006;        Objective   Vitals:Blood pressure 162/82, pulse 104, temperature 97 6 °F (36 4 °C), temperature source Temporal, resp  rate 16, height 5' 4" (1 626 m), weight 74 8 kg (165 lb), SpO2 99 %  ,Body mass index is 28 32 kg/m²  Intake/Output Summary (Last 24 hours) at 9/4/2019 1123  Last data filed at 9/4/2019 0601  Gross per 24 hour   Intake 1000 ml   Output 550 ml   Net 450 ml       Invasive Devices: Invasive Devices     Peripheral Intravenous Line            Peripheral IV 09/04/19 Left Arm less than 1 day                Physical Exam NAD  Skin: no seen lesions  HEENT: ATNC  Chest: breathing comfortably on room air  GI: no apparent distension  Neurologic Exam Eyes are closed, stating she cannot open them   Face is symmetric, but when she speaks, she pulls her left face appearing to have right sided weakness  She can lift all extremities against gravity  Lab Results:   CBC:   Results from last 7 days   Lab Units 09/03/19  0902   WBC Thousand/uL 6 00   RBC Million/uL 4 90   HEMOGLOBIN g/dL 13 4   HEMATOCRIT % 41 8   MCV fL 85   PLATELETS Thousands/uL 242   , BMP/CMP:   Results from last 7 days   Lab Units 09/04/19  0536 09/03/19  0902   SODIUM mmol/L 140 140   POTASSIUM mmol/L 4 7 4 0   CHLORIDE mmol/L 106 104   CO2 mmol/L 27 27   BUN mg/dL 11 17   CREATININE mg/dL 0 74 0 93   CALCIUM mg/dL 7 9* 8 9   AST U/L 28 20   ALT U/L 20 28   ALK PHOS U/L 65 79   EGFR ml/min/1 73sq m 87 66     Imaging Studies: I have personally reviewed pertinent reports  EKG, Pathology, and Other Studies: I have personally reviewed pertinent reports

## 2019-09-04 NOTE — PROGRESS NOTES
Called rapid response for the patient since an evident facial droop was noted on the  Her  Patient on assessment was Alert and oriented but unable to open her eyes for me  Her  was weak and patient was here for generalized weakness  Order for a stat CT of the head being done and will get EKG after she comes back from the procedure  Last BP was 168/89 and   Neuro is also consulted

## 2019-09-04 NOTE — OCCUPATIONAL THERAPY NOTE
OT EVALUATION       09/04/19 1545   Note Type   Note type Eval only   Restrictions/Precautions   Other Precautions Chair Alarm; Bed Alarm; Fall Risk;Pain  (unable to open eyes )   Pain Assessment   Pain Assessment 0-10   Pain Score 7   Pain Type Acute pain   Pain Location Head  (nurse aware )   Home Living   Type of Caitlyn Ryder 38  (2 [address])   Home Layout One level; Laundry in basement   Bathroom Shower/Tub Tub/shower unit   [address] [place] [address] chair   Home Equipment Cane;Walker   Prior Function   Level of [place] Independent with ADLs and functional mobility   [facility]   ADL Assistance Independent   IADLs Independent   Vocational Part time employment   ADL   Eating Assistance 4  Minimal Assistance   Grooming Assistance 4  Minimal Assistance   [facility] [address] 4  Minimal Assistance   [facility] [address] 3  Moderate Assistance    [address] 4  Minimal Assistance    [facility] [address] 3  Moderate [address]  4  Minimal Assistance   Bed Mobility   Supine to Sit 4  Minimal assistance   Additional items Assist x 2   Sit to Supine 4  Minimal assistance   Additional items Assist x 1;LE management   Transfers   Sit to Stand 4  Minimal assistance   Additional items Verbal cues   Stand to Sit 4  Minimal assistance   Additional items Verbal cues   Toilet transfer 4  Minimal assistance   Additional items Commode;Verbal cues   Functional Mobility   Functional Mobility 4  Minimal assistance   Additional Comments few steps to and from commode with RW, verbal cues for safety    Additional items Rolling walker   Balance   Static Sitting Fair   Dynamic Sitting Fair -   Static Standing Fair -   Dynamic Standing Poor +   Activity Tolerance   Activity Tolerance Patient limited by fatigue;Patient limited by pain  (weakness )   Nurse Made Aware yes   RUE Assessment   RUE Assessment WFL  (4-/5 grossly MMT)   LUE Assessment   LUE Assessment   (shoulder flexion 3+/5, elbow 4-/5,  3+/5)   Hand Function   Gross Motor Coordination Functional   Fine Motor Coordination Functional   Vision - Complex Assessment   Acuity Able to read employee name badge without difficulty   Additional Comments increased time to read badge, eyes closed throughout session but able to see commode and read name badge    Cognition   Overall Cognitive Status WFL   Arousal/Participation Cooperative   Attention Within functional limits   Orientation Level Oriented X4   Following Commands Follows all commands and directions without difficulty   Assessment   Limitation Decreased ADL status; Decreased UE strength;Decreased Safe judgement during ADL;Decreased endurance;Decreased self-care trans;Decreased high-level ADLs  (decreased balance and mobility )   Prognosis Good   Assessment Patient evaluated by Occupational Therapy  Patient admitted with Psychogenic weakness  The patients occupational profile, medical and therapy history includes a extensive additional review of physical, cognitive, or psychosocial history related to current functional performance  Comorbidities affecting functional mobility and ADLS include: Alcohol, weakness, arthropathy, IBS, osteopenia, lesion of plantar nerves bilat, radiculopathy of lumbar region, fibromyalgia  Prior to admission, patient was independent with functional mobility without assistive device, independent with ADLS, independent with IADLS and works part time  The evaluation identifies the following performance deficits: weakness, decreased ROM, impaired balance, decreased endurance, increased fall risk, new onset of impairment of functional mobility, decreased ADLS, decreased IADLS, pain, decreased activity tolerance, decreased safety awareness, impaired judgement and decreased strength, that result in activity limitations and/or participation restrictions   This evaluation requires clinical decision making of high complexity, because the patient presents with comorbidites that affect occupational performance and required significant modification of tasks or assistance with consideration of multiple treatment options  The Barthel Index was used as a functional outcome tool presenting with a score of 40, indicating marked limitations of functional mobility and ADLS  Patient will benefit from skilled Occupational Therapy services to address above deficits and facilitate a safe return to prior level of function  Goals   Patient Goals go home   STG Time Frame   (1-7 days)   Short Term Goal  Goals established to promote patient goal of going home:  Patient will increase standing tolerance to 3 minutes during ADL task to decrease assistance level and decrease fall risk; Patient will increase bed mobility to supervision in preparation for ADLS and transfers; Patient will increase functional mobility to and from bathroom with rolling walker with supervision to increase performance with ADLS and to use a toilet; Patient will tolerate 10 minutes of UE ROM/strengthening to increase general activity tolerance and performance in ADLS/IADLS; Patient will improve functional activity tolerance to 10 minutes of sustained functional tasks to increase participation in basic self-care and decrease assistance level;   Patient will increase dynamic sitting balance to fair+ to improve the ability to sit at edge of bed or on a chair for ADLS;  Patient will increase dynamic standing balance to fair to improve postural stability and decrease fall risk during standing ADLS and transfers  Patient will demonstrate compensatory techniques for low vision with minimal verbal cues     LTG Time Frame   (8-14 days)   Long Term Goal Goals established to promote patient goal of going home:  Patient will increase standing tolerance to 6 minutes during ADL task to decrease assistance level and decrease fall risk; Patient will increase bed mobility to independent in preparation for ADLS and transfers; Patient will increase functional mobility to and from bathroom with rolling walker independently to increase performance with ADLS and to use a toilet; Patient will tolerate 20 minutes of UE ROM/strengthening to increase general activity tolerance and performance in ADLS/IADLS; Patient will improve functional activity tolerance to 20 minutes of sustained functional tasks to increase participation in basic self-care and decrease assistance level;   Patient will increase dynamic sitting balance to good to improve the ability to sit at edge of bed or on a chair for ADLS;  Patient will increase dynamic standing balance to fair+ to improve postural stability and decrease fall risk during standing ADLS and transfers  Patient will demonstrate compensatory techniques for low vision without verbal cues  Functional Transfer Goals   Pt Will Perform All Functional Transfers   (STG supervision LTG Independent )   ADL Goals   Pt Will Perform Eating   (STG supervision LTG independent )   Pt Will Perform Grooming Standing at sink  (STG supervision LTG Independent )   Pt Will Perform Bathing   (STG min assist LTG supervision )   Pt Will Perform UE Dressing   (STG supervision LTG Independent )   Pt Will Perform LE Dressing   (STG min assist LTG supervision )   Pt Will Perform Toileting   (STG supervision LTG Independent )   Plan   Treatment Interventions ADL retraining;Functional transfer training;UE strengthening/ROM; Endurance training;Patient/family training;Equipment evaluation/education; Activityengagement; Compensatory technique education   Goal Expiration Date 09/18/19   OT Frequency 3-5x/wk   Recommendation   OT Discharge Recommendation Short Term Rehab  (pending progress)   Barthel Index   Feeding 5   Bathing 0   Grooming Score 0   Dressing Score 0   Bladder Score 10   Bowels Score 10   Toilet Use Score 5   Transfers (Bed/Chair) Score 10   Mobility (Level Surface) Score 0   Stairs Score 0   Barthel Index Score 40 Modified Jackie Scale   Modified [place] Scale 4   Licensure   [place] License Number  Kennedy Fishman Jairon 88 OTR/L [id number]

## 2019-09-04 NOTE — NURSING NOTE
Called downstairs to perform NIH scale for pt  Upon entering the room, pt noted lying in bed with eyes closed - pt stated she could see a "slit of light" coming through her eyes but was unable to open them  Pt alert and oriented, speech clear  I asked if I could open them and shine a light, pt agreeable, JJ noted  Pt asked to smile - pt unable to smile according to her  Mild facial droop noted  Unable to assess visual fields, gaze, language or dysarthria due to inability to open eyes  Pt reports mild sensory loss in L side both extremities - claims this is her baseline and her R side is typically stronger  RN notified of assessment

## 2019-09-05 LAB
ATRIAL RATE: 92 BPM
ATRIAL RATE: 94 BPM
B BURGDOR IGG+IGM SER-ACNC: <0.91 ISR (ref 0–0.9)
CMV IGG SERPL IA-ACNC: >10 U/ML (ref 0–0.59)
CMV IGM SERPL IA-ACNC: <30 AU/ML (ref 0–29.9)
MRSA NOSE QL CULT: NORMAL
P AXIS: 51 DEGREES
P AXIS: 58 DEGREES
PR INTERVAL: 154 MS
PR INTERVAL: 156 MS
QRS AXIS: -22 DEGREES
QRS AXIS: -23 DEGREES
QRSD INTERVAL: 78 MS
QRSD INTERVAL: 78 MS
QT INTERVAL: 350 MS
QT INTERVAL: 358 MS
QTC INTERVAL: 437 MS
QTC INTERVAL: 442 MS
T WAVE AXIS: 27 DEGREES
T WAVE AXIS: 35 DEGREES
VENTRICULAR RATE: 92 BPM
VENTRICULAR RATE: 94 BPM

## 2019-09-05 PROCEDURE — 93010 ELECTROCARDIOGRAM REPORT: CPT | Performed by: INTERNAL MEDICINE

## 2019-09-05 PROCEDURE — 97116 GAIT TRAINING THERAPY: CPT

## 2019-09-05 PROCEDURE — 97110 THERAPEUTIC EXERCISES: CPT

## 2019-09-05 PROCEDURE — 90670 PCV13 VACCINE IM: CPT | Performed by: INTERNAL MEDICINE

## 2019-09-05 RX ADMIN — ENOXAPARIN SODIUM 40 MG: 40 INJECTION SUBCUTANEOUS at 18:09

## 2019-09-05 RX ADMIN — ACETAMINOPHEN 650 MG: 325 TABLET, FILM COATED ORAL at 10:53

## 2019-09-05 RX ADMIN — GABAPENTIN 300 MG: 300 CAPSULE ORAL at 20:12

## 2019-09-05 RX ADMIN — Medication 400 MG: at 08:35

## 2019-09-05 RX ADMIN — DEXTROSE, SODIUM CHLORIDE, AND POTASSIUM CHLORIDE 75 ML/HR: 5; .45; .15 INJECTION INTRAVENOUS at 01:14

## 2019-09-05 RX ADMIN — CALCIUM 1 TABLET: 500 TABLET ORAL at 07:42

## 2019-09-05 RX ADMIN — ENOXAPARIN SODIUM 40 MG: 40 INJECTION SUBCUTANEOUS at 08:35

## 2019-09-05 RX ADMIN — GABAPENTIN 300 MG: 300 CAPSULE ORAL at 08:35

## 2019-09-05 RX ADMIN — CEFTRIAXONE 1000 MG: 1 INJECTION, SOLUTION INTRAVENOUS at 20:05

## 2019-09-05 RX ADMIN — CALCIUM 1 TABLET: 500 TABLET ORAL at 16:46

## 2019-09-05 RX ADMIN — PNEUMOCOCCAL 13-VALENT CONJUGATE VACCINE 0.5 ML: 2.2; 2.2; 2.2; 2.2; 2.2; 4.4; 2.2; 2.2; 2.2; 2.2; 2.2; 2.2; 2.2 INJECTION, SUSPENSION INTRAMUSCULAR at 20:12

## 2019-09-05 RX ADMIN — PANTOPRAZOLE SODIUM 40 MG: 40 TABLET, DELAYED RELEASE ORAL at 07:37

## 2019-09-05 RX ADMIN — ACETAMINOPHEN 650 MG: 325 TABLET, FILM COATED ORAL at 04:17

## 2019-09-05 RX ADMIN — GABAPENTIN 300 MG: 300 CAPSULE ORAL at 16:46

## 2019-09-05 RX ADMIN — DULOXETINE HYDROCHLORIDE 30 MG: 30 CAPSULE, DELAYED RELEASE ORAL at 08:35

## 2019-09-05 RX ADMIN — LEVOTHYROXINE SODIUM 25 MCG: 25 TABLET ORAL at 07:37

## 2019-09-05 NOTE — PROGRESS NOTES
Progress Note - Maricarmen Cleaning 59 y o  female MRN: [id number]    Unit/Bed#: [address] [place] [address] Encounter: [id number]      History of Present Illness   Feels better, opens eye completely and able to keep it open  Concerns about walking  Review of Systems   Constitutional: Positive for activity change  Negative for fever  HENT: Negative for drooling and sore throat  Eyes: Negative for pain  Respiratory: Negative for cough  Cardiovascular: Negative for chest pain and palpitations  Gastrointestinal: Negative for abdominal pain  Genitourinary: Negative for dysuria  Musculoskeletal: Negative for myalgias  Neurological: Negative for dizziness  Objective   First Vitals:   Blood Pressure: 146/77 (09/03/19 0843)  Pulse: 96 (09/03/19 0843)  Temperature: (!) 97 4 °F (36 3 °C) (09/03/19 0843)  Temp Source: Oral (09/03/19 1700)  Respirations: 22 (09/03/19 0843)  Height: 5' 4" (162 6 cm) (09/03/19 1640)  Weight - Scale: 70 kg (154 lb 5 2 oz) (09/03/19 0843)  SpO2: 98 % (09/03/19 0843)    Current Vitals:   Blood Pressure: 111/73 (09/05/19 0736)  Pulse: 83 (09/05/19 0736)  Temperature: 97 6 °F (36 4 °C) (09/05/19 0736)  Temp Source: Oral (09/05/19 0736)  Respirations: 18 (09/05/19 0736)  Height: 5' 4" (162 6 cm) (09/03/19 1640)  Weight - Scale: 77 1 kg (170 lb) (09/05/19 0600)  SpO2: 100 % (09/05/19 0736)      Intake/Output Summary (Last 24 hours) at 9/5/2019 1405  Last data filed at 9/5/2019 0551  Gross per 24 hour   Intake 2608 75 ml   Output 1150 ml   Net 1458 75 ml       Invasive Devices     Peripheral Intravenous Line            Peripheral IV 09/04/19 Left Arm 1 day                Physical Exam   Constitutional: She appears well-developed  No distress  HENT:   Head: Normocephalic and atraumatic  Eyes: Pupils are equal, round, and reactive to light  No scleral icterus  Neck: Neck supple  Cardiovascular: Normal rate and regular rhythm  Pulmonary/Chest: No respiratory distress     Abdominal: There is no tenderness  Musculoskeletal: She exhibits no tenderness or deformity  Neurological: No cranial nerve deficit  Lab Results:    Lab Results   Component Value Date    WBC 6 00 09/03/2019    WBC 4 7 03/29/2017    RBC 4 90 09/03/2019     BMP:   Lab Results   Component Value Date    GLUCOSE 95 08/04/2016    SODIUM 140 09/04/2019    CO2 27 09/04/2019    CO2 25 08/04/2016    BUN 11 09/04/2019    BUN 12 08/04/2016    CREATININE 0 74 09/04/2019    CREATININE 0 81 08/04/2016    CALCIUM 7 9 (L) 09/04/2019    CALCIUM 9 4 08/04/2016     Coagulation:   Lab Results   Component Value Date    INR 0 94 09/03/2019     Cardiac markers:   Lab Results   Component Value Date    CKMB 2 8 07/30/2019     ABGs: No results found for: PH  CMP:   Recent Labs     09/03/19  0902 09/04/19  0536   K 4 0 4 7    106   CO2 27 27   BUN 17 11   CREATININE 0 93 0 74   ALKPHOS 79 65   AST 20 28   ALT 28 20   LIPASE 72*  --    MG 2 0 1 9   PHOS 2 0* 3 5       Imaging: Xr Chest 1 View Portable    Result Date: 9/3/2019  Narrative: CHEST INDICATION:   cough weakness  COMPARISON:  3/3/2018 chest x-ray EXAM PERFORMED/VIEWS:  XR CHEST PORTABLE Single view FINDINGS: Cardiomediastinal silhouette appears unremarkable  The lungs are clear  No pneumothorax or pleural effusion  Osseous structures appear within normal limits for patient age  Impression: No acute cardiopulmonary disease  Findings are stable Workstation performed: [id number]     Xr Abdomen Complete Inc Upright And/or Decubitus    Result Date: 9/3/2019  Narrative: ABDOMEN INDICATION:   constipation  COMPARISON:  None VIEWS:  AP supine FINDINGS: There is a nonobstructive bowel gas pattern  Large amount of retained stool throughout the colon  No discernible free air on this supine study  Upright or left lateral decubitus imaging is more sensitive to detect subtle free air in the appropriate setting  No pathologic calcifications or soft tissue masses  Visualized lung bases are clear  Visualized osseous structures are unremarkable for the patient's age  Impression: There is a nonobstructive bowel gas pattern  Large amount of retained stool throughout the colon  Workstation performed: [id number]     Ct Head Wo Contrast    Result Date: 9/4/2019  Narrative: CT BRAIN - WITHOUT CONTRAST INDICATION:   Headache, acute, norm neuro exam  COMPARISON:  7/30/2019; 12/13/2019 TECHNIQUE:  CT examination of the brain was performed  In addition to axial images, coronal 2D reformatted images were created and submitted for interpretation  Radiation dose length product (DLP) for this visit:  928 85 mGy-cm   This examination, like all CT scans performed in the [facility], was performed utilizing techniques to minimize radiation dose exposure, including the use of iterative  reconstruction and automated exposure control  IMAGE QUALITY:  Diagnostic  FINDINGS: PARENCHYMA:  No intracranial mass, mass effect or midline shift  No CT signs of acute infarction  No acute parenchymal hemorrhage  VENTRICLES AND EXTRA-AXIAL SPACES:  Normal for the patient's age  VISUALIZED ORBITS AND PARANASAL SINUSES:  Left maxillary sinus mucus retention cysts noted anteriorly  Smaller right maxillary sinus mucus retention cysts noted also anteriorly  CALVARIUM AND EXTRACRANIAL SOFT TISSUES:  Normal      Impression: No acute intracranial hemorrhage, mass effect or edema  Workstation performed: [id number]     Mri Lumbar Spine Without Contrast    Result Date: 8/30/2019  Narrative: MRI LUMBAR SPINE WITHOUT CONTRAST INDICATION: R25 2: Cramp and spasm R53 1: Weakness  COMPARISON:  X-ray 4/20/2018 TECHNIQUE:  Sagittal T1, sagittal T2, sagittal inversion recovery, axial T1 and axial T2, coronal T2   IMAGE QUALITY:  Diagnostic FINDINGS: VERTEBRAL BODIES:  Minor right convex L2-3 apex scoliosis without spondylolysis or spondylolisthesis  No fractures    Normal marrow signal is identified within the visualized bony structures  No discrete marrow lesion  SACRUM:  Normal signal within the sacrum  No evidence of insufficiency or stress fracture  DISTAL CORD AND CONUS:  Normal size and signal within the distal cord and conus  PARASPINAL SOFT TISSUES:  Paraspinal soft tissues are unremarkable  LOWER THORACIC DISC SPACES:  Normal disc height and signal   No disc herniation, canal stenosis or foraminal narrowing  LUMBAR DISC SPACES: L1-L2:  Minor facet arthrosis, slight reduction disc, minor L2-L3:  Minor bulge, small marginal osteophytes  Facet arthrosis  L3-L4:  Circumferential bulge, minor facet arthrosis  L4-L5:  Slight loss of disc height, minor bulge, moderate right greater than left facet arthrosis  Moderate narrowing of the right foramen, correlate for right L4 radiculitis  L5-S1:  Moderate bilateral facet arthrosis, minor bulge, small marginal osteophytes, minor endplate changes  Impression: Mild to moderate multilevel degenerative changes  Potentially significant right L4-5 foraminal stenosis, correlate for right L4 radiculitis  Otherwise, changes are noncompressive  Workstation performed: [id number]     Mri Thoracic Spine With And Without Contrast    Result Date: 8/30/2019  Narrative: MRI THORACIC SPINE WITH AND WITHOUT CONTRAST INDICATION: Weakness and spasticity  History of melanoma  COMPARISON:  None  TECHNIQUE:  Sagittal T1, sagittal T2, sagittal inversion recovery, axial T2,  axial 2D MERGE  Sagittal and axial T1 postcontrast  IV Contrast:  4 mL of Gadobutrol injection (SINGLE-DOSE) there was technical difficulty with the IV contrast administration  In total 7 mL was planned for injection however, only 4 mL was administered  A new IV access point could not be acquired for additional contrast administration  IMAGE QUALITY:  Diagnostic  FINDINGS: ALIGNMENT:  There is a mild gradual thoracic kyphosis without evidence of acute angulation, spondylolysis or spondylolisthesis    The vertebral bodies are relatively preserved in stature  MARROW SIGNAL:  A few scattered small hemangiomas are noted within the thoracic vertebral bodies  There is no suspicious marrow edema  THORACIC CORD:  Normal signal within the thoracic cord  PREVERTEBRAL AND PARASPINAL SOFT TISSUES:   Normal  THORACIC DEGENERATIVE CHANGE:  There is minor degenerative discogenic disease  Small central and paracentral disc herniations are noted from T7-T8 to T9-T10  POSTCONTRAST:  No abnormal enhancement  Impression: Unremarkable MRI of the thoracic spine aside from mild degenerative discogenic disease without significant spinal canal or foraminal stenosis  No cord signal abnormality or pathologic enhancement  Workstation performed: [id number]     Ct Stroke Alert Brain    Result Date: 9/4/2019  Narrative: CT BRAIN - STROKE ALERT PROTOCOL INDICATION:   Focal neuro deficit, new, fixed or worsening, <6 hours  Right sided facial droop started at 9:55 AM COMPARISON:  9/3/2019; 2/11/2019; 7/30/2019 TECHNIQUE:  CT examination of the brain was performed  In addition to axial images, coronal reformatted images were created and submitted for interpretation  Radiation dose length product (DLP) for this visit:  951 62 mGy-cm   This examination, like all CT scans performed in the [facility], was performed utilizing techniques to minimize radiation dose exposure, including the use of iterative  reconstruction and automated exposure control  IMAGE QUALITY:  Diagnostic  FINDINGS:  PARENCHYMA:  No intracranial mass, mass effect or midline shift  No acute intracranial hemorrhage  No CT signs of acute infarction  Normal intracranial vasculature  VENTRICLES AND EXTRA-AXIAL SPACES:  Normal for patient's age  VISUALIZED ORBITS AND PARANASAL SINUSES:  Small retention cyst anteriorly in the left maxillary sinus  CALVARIUM AND EXTRACRANIAL SOFT TISSUES:   Normal      Impression: No acute intracranial hemorrhage, mass effect or edema   Findings were directly discussed with Dr Shawn Khan on 9/4/2019 10:20 AM  Workstation performed: [id number]     Cta Stroke Alert (head/neck)    Result Date: 9/4/2019  Narrative: CTA NECK AND BRAIN WITH CONTRAST INDICATION: Focal neuro deficit, new, fixed or worsening, <6 hours right facial droop started this morning  COMPARISON:   9/3/2019; 7/30/2019; 2/13/2019; 2/11/2019 TECHNIQUE:   Post contrast imaging was performed after administration of iodinated contrast through the neck and brain  Post contrast axial 0 625 mm images timed to opacify the arterial system  3D rendering was performed on an independent workstation  MIP reconstructions performed  Coronal reconstructions were performed of the noncontrast portion of the brain  Radiation dose length product (DLP) for this visit:  304 27 mGy-cm   This examination, like all CT scans performed in the [facility], was performed utilizing techniques to minimize radiation dose exposure, including the use of iterative  reconstruction and automated exposure control  IV Contrast:  85 mL of iohexol (OMNIPAQUE)  IMAGE QUALITY:   Diagnostic FINDINGS: CERVICAL VASCULATURE AORTIC ARCH AND GREAT VESSELS:  Normal aortic arch and great vessel origins  Normal visualized subclavian vessels  RIGHT VERTEBRAL ARTERY CERVICAL SEGMENT:  Normal origin  The vessel is normal in caliber throughout the neck  LEFT VERTEBRAL ARTERY CERVICAL SEGMENT:  Normal origin  The vessel is normal in caliber throughout the neck  RIGHT EXTRACRANIAL CAROTID SEGMENT:  Normal caliber common carotid artery  Normal bifurcation and cervical internal carotid artery  No stenosis or dissection  LEFT EXTRACRANIAL CAROTID SEGMENT:  Normal caliber common carotid artery  Normal bifurcation and cervical internal carotid artery  No stenosis or dissection  NASCET criteria was used to determine the degree of internal carotid artery diameter stenosis   INTRACRANIAL VASCULATURE INTERNAL CAROTID ARTERIES:  Normal enhancement of the intracranial portions of the internal carotid arteries  Normal ophthalmic artery origins  Normal ICA terminus  ANTERIOR CIRCULATION:  Symmetric A1 segments and anterior cerebral arteries with normal enhancement  Normal anterior communicating artery  MIDDLE CEREBRAL ARTERY CIRCULATION:  M1 segment and middle cerebral artery branches demonstrate normal enhancement bilaterally  DISTAL VERTEBRAL ARTERIES:  Normal distal vertebral arteries  Posterior inferior cerebellar artery origins are normal  Normal vertebral basilar junction  BASILAR ARTERY:  Basilar artery is normal in caliber  Normal superior cerebellar arteries  POSTERIOR CEREBRAL ARTERIES: Both posterior cerebral arteries arises from the basilar tip  Both arteries demonstrate normal enhancement  Normal posterior communicating arteries  DURAL VENOUS SINUSES:  Normal  NON VASCULAR ANATOMY BONY STRUCTURES:  Cervical fusion C3-4 C4-C5 noted with prosthetic intervertebral disc spacers  Orthopedic hardware well seated within bone  Mild spondylotic changes noted  SOFT TISSUES OF THE NECK:  Unremarkable  THORACIC INLET:  Unremarkable  Impression: 1  No hemodynamically significant stenosis in the major arteries of the neck  2   No intracranial aneurysm or major intracranial arterial stenosis  Findings were directly discussed with Dr Lupis Spence on 9/4/2019 10:20 AM  Workstation performed: [id number]     Code Status: Level 1 - Full Code  Advance Directive and Living Will:      Power of :    POLST:      Assessment:      Facial Asymetry - Resolved, CT findings / Neuro eval noted     2  Generalized weakness - No obvious etiology found yet  Better      3  Hypothyroidism - [place]in[place] synthroid       Principal Problem:    Psychogenic weakness    Present on Admission:  **None**        Plan:  As per orders  Observe for now

## 2019-09-05 NOTE — PHYSICAL THERAPY NOTE
PT TREATMENT     09/05/19 1110   Pain Assessment   Pain Assessment 0-10   Pain Score 7   Pain Type Acute pain   Pain Location Abdomen   Pain Orientation Right   Restrictions/Precautions   Other Precautions Pain; Fall Risk;Bed Alarm; Chair Alarm   General   Chart Reviewed Yes   Family/Caregiver Present No   Subjective   Subjective "better but still not strong enough to go home"   Transfers   Sit to Stand 4  Minimal assistance   Additional items Assist x 1;Verbal cues   Stand to Sit 4  Minimal assistance   Additional items Assist x 1;Verbal cues   Ambulation/Elevation   Gait pattern   (very slow;head down)   Gait Assistance 4  Minimal assist   Additional items Assist x 1;Verbal cues; Tactile cues   Assistive Device Rolling walker   Distance 75 feet x 2 with rest and change in direction   Balance   Static Sitting Fair +   Static Standing Fair -  (w RW)   Dynamic Standing Fair -  (w RW)   Ambulatory Poor +  (w RW)   Activity Tolerance   Activity Tolerance Patient limited by fatigue;Patient limited by pain   Exercises   Hip Flexion Right;10 reps; Sitting;AAROM; Left   Knee AROM Long Arc Quad Right;10 reps; Sitting;AAROM; Left   Ankle Pumps Right;10 reps; Sitting;AAROM; Left   Assessment   Assessment Pt is making steady progress with balance, endurance, trans, gait and mobility with the RW  Pt will cont to benefit from skilled PT services  Plan   Treatment/Interventions ADL retraining;Functional transfer training;LE strengthening/ROM; Elevations; Therapeutic exercise; Endurance training;Patient/family training;Equipment eval/education; Bed mobility;Gait training; Compensatory technique education   PT Frequency Once a day   Recommendation   Recommendation Short-term skilled PT  (vs home PT pending progress)   Additional Comments Pt feels she cannot manage at home at this point in her hospital stay     Licensure   [place] License Number  [address] [place] [address] [id number]

## 2019-09-05 NOTE — UTILIZATION REVIEW
Continued Stay Review  Date: 9/5/19                        Current Patient Class: OBSERVATION  Current Level of Care: TELEMETRY  Assessment/Plan:   PT REPORTS NOT STRONG ENOUGH TO GO HOME YET, [facility], [facility]F MAIN[facility]ED     Pertinent Labs/Diagnostic Results:   Results from last 7 days   Lab Units 09/03/19  0902   WBC Thousand/uL 6 00   HEMOGLOBIN g/dL 13 4   HEMATOCRIT % 41 8   PLATELETS Thousands/uL 242   NEUTROS ABS Thousands/µL 3 95     Results from last 7 days   Lab Units 09/04/19  0536 09/03/19  0902   SODIUM mmol/L 140 140   POTASSIUM mmol/L 4 7 4 0   CHLORIDE mmol/L 106 104   CO2 mmol/L 27 27   ANION GAP mmol/L 7 9   BUN mg/dL 11 17   CREATININE mg/dL 0 74 0 93   EGFR ml/min/1 73sq m 87 66   CALCIUM mg/dL 7 9* 8 9   MAGNESIUM mg/dL 1 9 2 0   PHOSPHORUS mg/dL 3 5 2 0*     Results from last 7 days   Lab Units 09/04/19  0536 09/03/19  0902   AST U/L 28 20   ALT U/L 20 28   ALK PHOS U/L 65 79   TOTAL PROTEIN g/dL 6 0* 7 1   ALBUMIN g/dL 2 7* 3 4*   TOTAL BILIRUBIN mg/dL 0 30 0 20     Results from last 7 days   Lab Units 09/04/19  0957   POC GLUCOSE mg/dl 146*     Results from last 7 days   Lab Units 09/04/19  0536 09/03/19  0902   GLUCOSE RANDOM mg/dL 105 97     Results from last 7 days   Lab Units 09/03/19  0902   TROPONIN I ng/mL <0 02     Results from last 7 days   Lab Units 09/03/19  0902   PROTIME seconds 10 1   INR  0 94   PTT seconds 26     Results from last 7 days   Lab Units 09/03/19  0902   TSH 3RD GENERATON uIU/mL 3 404     Results from last 7 days   Lab Units 09/03/19  0902   LACTIC ACID mmol/L 1 6     Results from last 7 days   Lab Units 09/03/19  0902   LIPASE u/L 72*     Results from last 7 days   Lab Units 09/03/19  1552 09/03/19  0902   CRP mg/L  --  7 7*   SED RATE mm/hour 21  --      Results from last 7 days   Lab Units 09/03/19  1115   CLARITY UA  Clear   COLOR UA  Light Yellow   SPEC GRAV UA  1 010   PH UA  8 0   GLUCOSE UA mg/dl Negative   KETONES UA mg/dl Negative   BLOOD UA  Negative   PROTEIN UA mg/dl Negative   NITRITE UA  Negative   BILIRUBIN UA  Negative   UROBILINOGEN UA E U /dl 0 2   LEUKOCYTES UA  Negative         Results from last 7 days   Lab Units 09/03/19  1115   AMPH/METH  Negative   BARBITURATE UR  Negative   BENZODIAZEPINE UR  Negative   COCAINE UR  Negative   METHADONE URINE  Negative   OPIATE UR  Negative   PCP UR  Negative   THC UR  Negative     Vital Signs: /73 (BP Location: Right arm)   Pulse 83   Temp 97 6 °F (36 4 °C) (Oral)   Resp 18   Ht 5' 4" (1 626 m)   Wt 77 1 kg (170 lb)   SpO2 100%   BMI 29 18 kg/m²   Medications:   acetaminophen 650 mg Oral Q8H PRN   acetaminophen 650 mg Oral Q6H PRN   ALPRAZolam 0 5 mg Oral BID PRN   calcium carbonate 1 tablet Oral BID With Meals   dextrose 5 % and sodium chloride 0 45 % with KCl 20 mEq/L 75 mL/hr Intravenous Continuous   DULoxetine 30 mg Oral Daily   enoxaparin 40 mg Subcutaneous BID   gabapentin 300 mg Oral TID   levothyroxine 25 mcg Oral Early Morning   magnesium oxide 400 mg Oral Daily   pantoprazole 40 mg Oral Early Morning   pneumococcal 13-valent conjugate vaccine 0 5 mL Intramuscular Prior to discharge     Discharge Plan: TBD  Network Utilization Review Department  Phone: [phone]; Fax [phone]  [email or web address]  org  ATTENTION: Please call with any questions or concerns to [phone]  and carefully listen to the prompts so that you are directed to the right person  Send all requests for admission clinical reviews, approved or denied determinations and any other requests to fax [phone]   All voicemails are confidential

## 2019-09-05 NOTE — PROGRESS NOTES
Patient examined spoke with the nurse  Patient is alert awake cooperative happy to see me she reports that she is feeling a little stronger and she went for a walk couple of times with the physical therapist and she seems to be improving  Patient has ongoing left hip problem and she is on Neurontin for the neuropathy for a while  Patient is also suffering from depression and she is taking Cymbalta  Patient is encouraged to consider psychiatric follow-up with the psychiatrist and therapist I gave her a name of the therapist with the phone number to call after the discharge  Patient reports that she wants to go home and try to avoid short-term rehab  Last year for the similar think she went to the short-term rehab at nursing home and she did not like the place  No side effects of Cymbalta  Patient is pleasant and cooperative communicates her feelings well she offers no new complaints  Patient has ongoing physical issues  No psychosis no hallucinations  Nurse reported no behavior problems  Therapy done with good response  I will follow up

## 2019-09-06 VITALS
TEMPERATURE: 97.6 F | DIASTOLIC BLOOD PRESSURE: 69 MMHG | WEIGHT: 169.75 LBS | SYSTOLIC BLOOD PRESSURE: 121 MMHG | OXYGEN SATURATION: 98 % | RESPIRATION RATE: 18 BRPM | HEART RATE: 80 BPM | BODY MASS INDEX: 28.98 KG/M2 | HEIGHT: 64 IN

## 2019-09-06 DIAGNOSIS — Z00.8 MEDICAL CLEARANCE FOR PSYCHIATRIC ADMISSION: Primary | ICD-10-CM

## 2019-09-06 DIAGNOSIS — G43.809 CERVICOGENIC MIGRAINE: ICD-10-CM

## 2019-09-06 PROCEDURE — 97110 THERAPEUTIC EXERCISES: CPT

## 2019-09-06 PROCEDURE — 94760 N-INVAS EAR/PLS OXIMETRY 1: CPT

## 2019-09-06 PROCEDURE — 97116 GAIT TRAINING THERAPY: CPT

## 2019-09-06 RX ORDER — ACETAMINOPHEN 325 MG/1
975 TABLET ORAL EVERY 6 HOURS PRN
Status: CANCELLED | OUTPATIENT
Start: 2019-09-06

## 2019-09-06 RX ORDER — OLANZAPINE 10 MG/1
5 INJECTION, POWDER, LYOPHILIZED, FOR SOLUTION INTRAMUSCULAR
Status: CANCELLED | OUTPATIENT
Start: 2019-09-06

## 2019-09-06 RX ORDER — LORAZEPAM 2 MG/ML
2 INJECTION INTRAMUSCULAR EVERY 4 HOURS PRN
Status: CANCELLED | OUTPATIENT
Start: 2019-09-06

## 2019-09-06 RX ORDER — AMITRIPTYLINE HYDROCHLORIDE 25 MG/1
TABLET, FILM COATED ORAL
Qty: 60 TABLET | Refills: 2 | Status: SHIPPED | OUTPATIENT
Start: 2019-09-06 | End: 2019-09-20 | Stop reason: HOSPADM

## 2019-09-06 RX ORDER — ACETAMINOPHEN 325 MG/1
650 TABLET ORAL EVERY 6 HOURS PRN
Qty: 30 TABLET | Refills: 0 | Status: SHIPPED | OUTPATIENT
Start: 2019-09-06 | End: 2019-09-20 | Stop reason: HOSPADM

## 2019-09-06 RX ORDER — HYDROXYZINE 50 MG/1
50 TABLET, FILM COATED ORAL EVERY 4 HOURS PRN
Status: CANCELLED | OUTPATIENT
Start: 2019-09-06

## 2019-09-06 RX ORDER — RISPERIDONE 1 MG/1
1 TABLET, ORALLY DISINTEGRATING ORAL
Status: CANCELLED | OUTPATIENT
Start: 2019-09-06

## 2019-09-06 RX ORDER — HALOPERIDOL 5 MG/ML
5 INJECTION INTRAMUSCULAR EVERY 6 HOURS PRN
Status: CANCELLED | OUTPATIENT
Start: 2019-09-06

## 2019-09-06 RX ORDER — TRAZODONE HYDROCHLORIDE 50 MG/1
50 TABLET ORAL
Status: CANCELLED | OUTPATIENT
Start: 2019-09-06

## 2019-09-06 RX ORDER — BENZTROPINE MESYLATE 1 MG/ML
1 INJECTION INTRAMUSCULAR; INTRAVENOUS
Status: CANCELLED | OUTPATIENT
Start: 2019-09-06

## 2019-09-06 RX ORDER — ACETAMINOPHEN 325 MG/1
650 TABLET ORAL EVERY 4 HOURS PRN
Status: CANCELLED | OUTPATIENT
Start: 2019-09-06

## 2019-09-06 RX ORDER — ACETAMINOPHEN 325 MG/1
650 TABLET ORAL EVERY 6 HOURS PRN
Status: CANCELLED | OUTPATIENT
Start: 2019-09-06

## 2019-09-06 RX ORDER — MAGNESIUM HYDROXIDE/ALUMINUM HYDROXICE/SIMETHICONE 120; 1200; 1200 MG/30ML; MG/30ML; MG/30ML
30 SUSPENSION ORAL EVERY 4 HOURS PRN
Status: CANCELLED | OUTPATIENT
Start: 2019-09-06

## 2019-09-06 RX ADMIN — GABAPENTIN 300 MG: 300 CAPSULE ORAL at 09:41

## 2019-09-06 RX ADMIN — LEVOTHYROXINE SODIUM 25 MCG: 25 TABLET ORAL at 06:03

## 2019-09-06 RX ADMIN — PANTOPRAZOLE SODIUM 40 MG: 40 TABLET, DELAYED RELEASE ORAL at 06:03

## 2019-09-06 RX ADMIN — ENOXAPARIN SODIUM 40 MG: 40 INJECTION SUBCUTANEOUS at 09:40

## 2019-09-06 RX ADMIN — DULOXETINE HYDROCHLORIDE 30 MG: 30 CAPSULE, DELAYED RELEASE ORAL at 09:41

## 2019-09-06 RX ADMIN — CALCIUM 1 TABLET: 500 TABLET ORAL at 09:40

## 2019-09-06 RX ADMIN — GABAPENTIN 300 MG: 300 CAPSULE ORAL at 16:17

## 2019-09-06 RX ADMIN — CALCIUM 1 TABLET: 500 TABLET ORAL at 16:17

## 2019-09-06 RX ADMIN — ACETAMINOPHEN 650 MG: 325 TABLET, FILM COATED ORAL at 06:03

## 2019-09-06 RX ADMIN — GABAPENTIN 300 MG: 300 CAPSULE ORAL at 22:11

## 2019-09-06 RX ADMIN — Medication 400 MG: at 09:41

## 2019-09-06 NOTE — ED NOTES
9/6/19 @ 1312:  Received call from RN, who reports that Pt's MD would like to consult with PES  PES met with MD, who states, "I think she's faking these symptoms "  MD described why, and PES discussed possibility of issues being due to medication or Thyroid issues, but MD stated, "Probably not "  PES will consult with Dr Anita Saxena  [facility],  [address]: PES contacted Dr Anita Saxena and explained situation  Dr Anita Saxena is going to see patient shortly and will inform PES of his plan  PES will continue to monitor  [facility], [facility] [address]  1438: Received call from Dr Anita Saxena, and he reports that patient would benefit from inpatient psychiatric treatment, and is voluntary  PES will begin bed search  mel[place], [facility] [address]  1441: PES sent message to [facility] intake; will wait for response  [place], [place]  1443: Received message from Denilson Florence at [place] [address] intake; bed available; will have patient endorse 201  [place], [place]  1500: PES met with patient and explained 201 and 72 hour notice; patient endorsed and PES faxed to [facility] intake @ [phone]; Denilson Florence confirmed receipt  PES placed 201 in envelope and placed in patient's chart on unit; PES informed RN of need to send 201 with patient, and where it was placed    [address] [place], [place]

## 2019-09-06 NOTE — PROGRESS NOTES
Crisis worker called me earlier today asked me to evaluate the patient for depression and recommendation  He described her being overwhelmed  Spoke to  and the nurse  Patient examined she reports that she afraid to go home and she may not be approved for short-term rehab  Patient described being severely depressed and cannot relax she is concerned about her physical illness and cannot think straight she feels foggy in her mind cannot sleep cannot relax continue having awake somatic complaints all medical workup is negative and she still continue having unexplained physical complaints  I sat down and spoke with the patient and listen to her carefully after that I recommended that patient may not survive home she is emotionally very fragile and she may benefit from going to inpatient psychiatric care for further treatment and stabilization before she deteriorate and become suicidal currently patient denies feeling suicidal but she is severely depressed and emotionally fragile cannot take care of herself and she will not survive in community at this time  Patient has lot of concerns about her physical illness and unexplained complaints  Currently patient is not hallucinating but in my clinical evaluation patient is severely depressed and in my opinion patient will benefit from inpatient psychiatric care patient also agreed to go for voluntary admission  After the evaluation I spoke to Dr Dejuan Arias and call the crisis worker again to refer her for inpatient psychiatric care patient wants to continue her Cymbalta 30 mg daily and she will work with the psychiatrist at the psych unit to adjust the medication or may consider changing the antidepressant medication  Therapy done with good response  Patient will be transferred to inpatient psychiatric care for further treatment and stabilization

## 2019-09-06 NOTE — SOCIAL WORK
Case discussed with Dr Kennedy Her and Camila Hamlin  Plan has been changed to inpatient psychiatric care for further treatment  Rehab plans cancelled

## 2019-09-06 NOTE — CASE MANAGEMENT
LOS - 4 days    SW met with pt to assess needs and discuss plans  Discussed goals of making sure pt's needs are met upon discharge and pt's preferences are taken into account  Pt very tearful, depressed  Pt not feeling herself  Was working, driving, independent prior to having to come to hospital   Had been started on antidepressant by PCP but experience has not been good  Pt's PCP is Dr Franki Ahuja MD   Currently PCP is away and being covered by Dr Delilah Murrell  Pt remained tearful, inconsolable at not feeling like herself and the thought of discharging home  SW briefly discussed rehab placement  However pt requesting assistance with emotional issues  Pt has seen Dr Kailyn Baker during her hospitalization  Pt even thought she may want to talk to crisis  Offered to call PES, 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

## 2019-09-06 NOTE — PHYSICAL THERAPY NOTE
PT TREATMENT     09/06/19 5165   Pain Assessment   Pain Assessment 0-10   Pain Score 7   Pain Type Acute pain   Pain Location   (right calf and LLE)   Restrictions/Precautions   Other Precautions Fall Risk;Bed Alarm; Chair Alarm;Pain   General   Chart Reviewed Yes   Family/Caregiver Present No   Subjective   Subjective "This pain in my right calf is new and it won't stop tremoring"   Bed Mobility   Supine to Sit 5  Supervision   Additional items Assist x 1;Verbal cues   Transfers   Sit to Stand 4  Minimal assistance   Additional items Assist x 1;Verbal cues   Stand to Sit 4  Minimal assistance   Additional items Assist x 1;Verbal cues   Ambulation/Elevation   Gait pattern   (guarded;head down;difficult to advance RLE at times)   Gait Assistance 4  Minimal assist   Additional items Assist x 1;Verbal cues; Tactile cues   Assistive Device Rolling walker   Distance Pt amb 70 feet x 2 with change in direction and rest inbetween walks  At times, pt has difficulty advancing RLE  Pt walks on toes on RLE  Balance   Static Sitting Fair +   Static Standing Fair -  (w RW)   Dynamic Standing Poor +  (w RW)   Ambulatory Poor +  (w RW)   Activity Tolerance   Activity Tolerance Patient limited by fatigue;Patient limited by pain  (weakness and tremors RLE)   Exercises   Hip Flexion Bilateral;AAROM;10 reps; Sitting  (alternating)   Knee AROM Long Arc Quad Bilateral;AAROM;10 reps; Sitting  (alternating)   Ankle Pumps Bilateral;AAROM;10 reps; Sitting  (alternating)   Heel Cord Stretch Right;5 reps;PROM   Assessment   Assessment Pt reporting and PT noted pt with right calf/LE tremors today with ambulation which affects pt's amb ability  Pt walking on toes on right as well  Pt will cont to benefit from skilled PT services to increase pt's strength and mobility  Plan   Treatment/Interventions ADL retraining;Functional transfer training;LE strengthening/ROM; Elevations; Therapeutic exercise; Endurance training;Patient/family training;Equipment eval/education; Bed mobility;Gait training; Compensatory technique education   PT Frequency Once a day   Recommendation   Recommendation Short-term skilled PT   Licensure   [place] License Number  [id number] [place] [address] [id number]

## 2019-09-06 NOTE — PLAN OF CARE
Problem: Potential for Falls  Goal: Patient will remain free of falls  Description  INTERVENTIONS:  - Assess patient frequently for physical needs  -  Identify cognitive and physical deficits and behaviors that affect risk of falls  -  [place] fall precautions as indicated by assessment   - Educate patient/family on patient safety including physical limitations  - Instruct patient to call for assistance with activity based on assessment  - Modify environment to reduce risk of injury  - Consider OT/PT consult to assist with strengthening/mobility  Outcome: Progressing  Note:   Patient ambulated safely using walker in the hallways during the day  Problem: MUSCULOSKELETAL - ADULT  Goal: Maintain or return mobility to safest level of function  Description  INTERVENTIONS:  - Assess patient's ability to carry out ADLs; assess patient's baseline for ADL function and identify physical deficits which impact ability to perform ADLs (bathing, care of mouth/teeth, toileting, grooming, dressing, etc )  - Assess/evaluate cause of self-care deficits   - Assess range of motion  - Assess patient's mobility  - Assess patient's need for assistive devices and provide as appropriate  - Encourage maximum independence but intervene and supervise when necessary  - Involve family in performance of ADLs  - Assess for home care needs following discharge   - Consider OT consult to assist with ADL evaluation and planning for discharge  - Provide patient education as appropriate  Outcome: Progressing  Note:   Patient is now able to ambulate to the batrhroom, walker-assisted, instead of using the bedside commode       Problem: PAIN - ADULT  Goal: Verbalizes/displays adequate comfort level or baseline comfort level  Description  Interventions:  - Encourage patient to monitor pain and request assistance  - Assess pain using appropriate pain scale  - Administer analgesics based on type and severity of pain and evaluate response  - Implement non-pharmacological measures as appropriate and evaluate response  - Consider cultural and social influences on pain and pain management  - Notify physician/advanced practitioner if interventions unsuccessful or patient reports new pain  Outcome: Adequate for Discharge  Note:   Patient reports no more pain       Problem: MUSCULOSKELETAL - ADULT  Goal: Maintain proper alignment of affected body part  Description  INTERVENTIONS:  - Support, maintain and protect limb and body alignment  - Provide patient/ family with appropriate education  Outcome: Completed

## 2019-09-06 NOTE — ED NOTES
9/6/19 @ 1115:  PES received call from Perfecto Figueroa, , and asked to assist with patient's needs  PES will meet with patient and discuss treatment options  [address]  [phone]:  PES met with patient, who presents depressed and tearful  In fact, patient was unable to stop crying  PES provided positive reinforcement  Patient stated that her emotions changed approximately 2 weeks ago when she was diagnosed with Thyroid issue and started on Synthroid  Patient said, "about 2 days after I started Synthroid, I started to feel depressed, so my MD started me on Cymbalta; I have never felt depressed before; I don't want to feel this way "  Patient reports that she's had "a little bit of suicidal ideations, but only a little; just passing thoughts, but I'm not suicidal "  Patient says, "I'm safe to be alone, I just don't want to feel this way "  In addition to medications added, patient reports that she was taken off Elavil  PES will consult with Dr Natalya Toussaint and come with a treatment plan  Patient said, "I feel a little better because I feel like we will have a plan "  PES will continue to follow    Jay Castro, [place]

## 2019-09-06 NOTE — PROGRESS NOTES
Progress Note - Cy Kitchen 59 y o  female MRN: [id number]    Unit/Bed#: [address] [address] Encounter: [id number]      History of Present Illness     No new issue  Feel uncomfortable going home  Review of Systems     Constitutional: Positive for activity change  Negative for fever  HENT: Negative for drooling and sore throat  Eyes: Negative for pain  Respiratory: Negative for cough  Cardiovascular: Negative for chest pain and palpitations  Gastrointestinal: Negative for abdominal pain  Genitourinary: Negative for dysuria  Musculoskeletal: Negative for myalgias  Neurological: Negative for dizziness  Objective   First Vitals:   Blood Pressure: 146/77 (09/03/19 0843)  Pulse: 96 (09/03/19 0843)  Temperature: (!) 97 4 °F (36 3 °C) (09/03/19 0843)  Temp Source: Oral (09/03/19 1700)  Respirations: 22 (09/03/19 0843)  Height: 5' 4" (162 6 cm) (09/03/19 1640)  Weight - Scale: 70 kg (154 lb 5 2 oz) (09/03/19 0843)  SpO2: 98 % (09/03/19 0843)    Current Vitals:   Blood Pressure: 113/66 (09/06/19 0730)  Pulse: 79 (09/06/19 0730)  Temperature: 98 °F (36 7 °C) (09/06/19 0730)  Temp Source: Oral (09/06/19 0730)  Respirations: 18 (09/06/19 0730)  Height: 5' 4" (162 6 cm) (09/03/19 1640)  Weight - Scale: 77 kg (169 lb 12 1 oz) (09/06/19 0557)  SpO2: 97 % (09/06/19 0802)      Intake/Output Summary (Last 24 hours) at 9/6/2019 1443  Last data filed at 9/5/2019 2035  Gross per 24 hour   Intake 50 ml   Output    Net 50 ml       Invasive Devices     Peripheral Intravenous Line            Peripheral IV 09/04/19 Left Arm 2 days                Physical Exam    Constitutional: She appears well-developed  No distress  HENT:   Head: Normocephalic and atraumatic  Eyes: Pupils are equal, round, and reactive to light  No scleral icterus  Neck: Neck supple  Cardiovascular: Normal rate and regular rhythm  Pulmonary/Chest: No respiratory distress  Abdominal: There is no tenderness     Musculoskeletal: She exhibits no tenderness or deformity  Neurological: No cranial nerve deficit  Lab Results:    Lab Results   Component Value Date    WBC 6 00 09/03/2019    WBC 4 7 03/29/2017    RBC 4 90 09/03/2019     BMP:   Lab Results   Component Value Date    GLUCOSE 95 08/04/2016    SODIUM 140 09/04/2019    CO2 27 09/04/2019    CO2 25 08/04/2016    BUN 11 09/04/2019    BUN 12 08/04/2016    CREATININE 0 74 09/04/2019    CREATININE 0 81 08/04/2016    CALCIUM 7 9 (L) 09/04/2019    CALCIUM 9 4 08/04/2016     Coagulation:   Lab Results   Component Value Date    INR 0 94 09/03/2019     Cardiac markers:   Lab Results   Component Value Date    CKMB 2 8 07/30/2019     ABGs: No results found for: PH  CMP:   Recent Labs     09/04/19  0536   K 4 7      CO2 27   BUN 11   CREATININE 0 74   ALKPHOS 65   AST 28   ALT 20   MG 1 9   PHOS 3 5       Imaging: Xr Chest 1 View Portable    Result Date: 9/3/2019  Narrative: CHEST INDICATION:   cough weakness  COMPARISON:  3/3/2018 chest x-ray EXAM PERFORMED/VIEWS:  XR CHEST PORTABLE Single view FINDINGS: Cardiomediastinal silhouette appears unremarkable  The lungs are clear  No pneumothorax or pleural effusion  Osseous structures appear within normal limits for patient age  Impression: No acute cardiopulmonary disease  Findings are stable Workstation performed: [id number]     Xr Abdomen Complete Inc Upright And/or Decubitus    Result Date: 9/3/2019  Narrative: ABDOMEN INDICATION:   constipation  COMPARISON:  None VIEWS:  AP supine FINDINGS: There is a nonobstructive bowel gas pattern  Large amount of retained stool throughout the colon  No discernible free air on this supine study  Upright or left lateral decubitus imaging is more sensitive to detect subtle free air in the appropriate setting  No pathologic calcifications or soft tissue masses  Visualized lung bases are clear  Visualized osseous structures are unremarkable for the patient's age       Impression: There is a nonobstructive bowel gas pattern  Large amount of retained stool throughout the colon  Workstation performed: [id number]     Ct Head Wo Contrast    Result Date: 9/4/2019  Narrative: CT BRAIN - WITHOUT CONTRAST INDICATION:   Headache, acute, norm neuro exam  COMPARISON:  7/30/2019; 12/13/2019 TECHNIQUE:  CT examination of the brain was performed  In addition to axial images, coronal 2D reformatted images were created and submitted for interpretation  Radiation dose length product (DLP) for this visit:  928 85 mGy-cm   This examination, like all CT scans performed in the [facility], was performed utilizing techniques to minimize radiation dose exposure, including the use of iterative  reconstruction and automated exposure control  IMAGE QUALITY:  Diagnostic  FINDINGS: PARENCHYMA:  No intracranial mass, mass effect or midline shift  No CT signs of acute infarction  No acute parenchymal hemorrhage  VENTRICLES AND EXTRA-AXIAL SPACES:  Normal for the patient's age  VISUALIZED ORBITS AND PARANASAL SINUSES:  Left maxillary sinus mucus retention cysts noted anteriorly  Smaller right maxillary sinus mucus retention cysts noted also anteriorly  CALVARIUM AND EXTRACRANIAL SOFT TISSUES:  Normal      Impression: No acute intracranial hemorrhage, mass effect or edema  Workstation performed: [id number]     Mri Lumbar Spine Without Contrast    Result Date: 8/30/2019  Narrative: MRI LUMBAR SPINE WITHOUT CONTRAST INDICATION: R25 2: Cramp and spasm R53 1: Weakness  COMPARISON:  X-ray 4/20/2018 TECHNIQUE:  Sagittal T1, sagittal T2, sagittal inversion recovery, axial T1 and axial T2, coronal T2   IMAGE QUALITY:  Diagnostic FINDINGS: VERTEBRAL BODIES:  Minor right convex L2-3 apex scoliosis without spondylolysis or spondylolisthesis  No fractures  Normal marrow signal is identified within the visualized bony structures  No discrete marrow lesion  SACRUM:  Normal signal within the sacrum  No evidence of insufficiency or stress fracture  DISTAL CORD AND CONUS:  Normal size and signal within the distal cord and conus  PARASPINAL SOFT TISSUES:  Paraspinal soft tissues are unremarkable  LOWER THORACIC DISC SPACES:  Normal disc height and signal   No disc herniation, canal stenosis or foraminal narrowing  LUMBAR DISC SPACES: L1-L2:  Minor facet arthrosis, slight reduction disc, minor L2-L3:  Minor bulge, small marginal osteophytes  Facet arthrosis  L3-L4:  Circumferential bulge, minor facet arthrosis  L4-L5:  Slight loss of disc height, minor bulge, moderate right greater than left facet arthrosis  Moderate narrowing of the right foramen, correlate for right L4 radiculitis  L5-S1:  Moderate bilateral facet arthrosis, minor bulge, small marginal osteophytes, minor endplate changes  Impression: Mild to moderate multilevel degenerative changes  Potentially significant right L4-5 foraminal stenosis, correlate for right L4 radiculitis  Otherwise, changes are noncompressive  Workstation performed: [id number]     Mri Thoracic Spine With And Without Contrast    Result Date: 8/30/2019  Narrative: MRI THORACIC SPINE WITH AND WITHOUT CONTRAST INDICATION: Weakness and spasticity  History of melanoma  COMPARISON:  None  TECHNIQUE:  Sagittal T1, sagittal T2, sagittal inversion recovery, axial T2,  axial 2D MERGE  Sagittal and axial T1 postcontrast  IV Contrast:  4 mL of Gadobutrol injection (SINGLE-DOSE) there was technical difficulty with the IV contrast administration  In total 7 mL was planned for injection however, only 4 mL was administered  A new IV access point could not be acquired for additional contrast administration  IMAGE QUALITY:  Diagnostic  FINDINGS: ALIGNMENT:  There is a mild gradual thoracic kyphosis without evidence of acute angulation, spondylolysis or spondylolisthesis  The vertebral bodies are relatively preserved in stature  MARROW SIGNAL:  A few scattered small hemangiomas are noted within the thoracic vertebral bodies    There is no suspicious marrow edema  THORACIC CORD:  Normal signal within the thoracic cord  PREVERTEBRAL AND PARASPINAL SOFT TISSUES:   Normal  THORACIC DEGENERATIVE CHANGE:  There is minor degenerative discogenic disease  Small central and paracentral disc herniations are noted from T7-T8 to T9-T10  POSTCONTRAST:  No abnormal enhancement  Impression: Unremarkable MRI of the thoracic spine aside from mild degenerative discogenic disease without significant spinal canal or foraminal stenosis  No cord signal abnormality or pathologic enhancement  Workstation performed: [id number]     Ct Stroke Alert Brain    Result Date: 9/4/2019  Narrative: CT BRAIN - STROKE ALERT PROTOCOL INDICATION:   Focal neuro deficit, new, fixed or worsening, <6 hours  Right sided facial droop started at 9:55 AM COMPARISON:  9/3/2019; 2/11/2019; 7/30/2019 TECHNIQUE:  CT examination of the brain was performed  In addition to axial images, coronal reformatted images were created and submitted for interpretation  Radiation dose length product (DLP) for this visit:  951 62 mGy-cm   This examination, like all CT scans performed in the [facility], was performed utilizing techniques to minimize radiation dose exposure, including the use of iterative  reconstruction and automated exposure control  IMAGE QUALITY:  Diagnostic  FINDINGS:  PARENCHYMA:  No intracranial mass, mass effect or midline shift  No acute intracranial hemorrhage  No CT signs of acute infarction  Normal intracranial vasculature  VENTRICLES AND EXTRA-AXIAL SPACES:  Normal for patient's age  VISUALIZED ORBITS AND PARANASAL SINUSES:  Small retention cyst anteriorly in the left maxillary sinus  CALVARIUM AND EXTRACRANIAL SOFT TISSUES:   Normal      Impression: No acute intracranial hemorrhage, mass effect or edema   Findings were directly discussed with Dr Carlo Nath on 9/4/2019 10:20 AM  Workstation performed: [id number]     Cta Stroke Alert (head/neck)    Result Date: 9/4/2019  Narrative: CTA NECK AND BRAIN WITH CONTRAST INDICATION: Focal neuro deficit, new, fixed or worsening, <6 hours right facial droop started this morning  COMPARISON:   9/3/2019; 7/30/2019; 2/13/2019; 2/11/2019 TECHNIQUE:   Post contrast imaging was performed after administration of iodinated contrast through the neck and brain  Post contrast axial 0 625 mm images timed to opacify the arterial system  3D rendering was performed on an independent workstation  MIP reconstructions performed  Coronal reconstructions were performed of the noncontrast portion of the brain  Radiation dose length product (DLP) for this visit:  304 27 mGy-cm   This examination, like all CT scans performed in the [facility], was performed utilizing techniques to minimize radiation dose exposure, including the use of iterative  reconstruction and automated exposure control  IV Contrast:  85 mL of iohexol (OMNIPAQUE)  IMAGE QUALITY:   Diagnostic FINDINGS: CERVICAL VASCULATURE AORTIC ARCH AND GREAT VESSELS:  Normal aortic arch and great vessel origins  Normal visualized subclavian vessels  RIGHT VERTEBRAL ARTERY CERVICAL SEGMENT:  Normal origin  The vessel is normal in caliber throughout the neck  LEFT VERTEBRAL ARTERY CERVICAL SEGMENT:  Normal origin  The vessel is normal in caliber throughout the neck  RIGHT EXTRACRANIAL CAROTID SEGMENT:  Normal caliber common carotid artery  Normal bifurcation and cervical internal carotid artery  No stenosis or dissection  LEFT EXTRACRANIAL CAROTID SEGMENT:  Normal caliber common carotid artery  Normal bifurcation and cervical internal carotid artery  No stenosis or dissection  NASCET criteria was used to determine the degree of internal carotid artery diameter stenosis  INTRACRANIAL VASCULATURE INTERNAL CAROTID ARTERIES:  Normal enhancement of the intracranial portions of the internal carotid arteries  Normal ophthalmic artery origins  Normal ICA terminus   ANTERIOR CIRCULATION:  Symmetric A1 segments and anterior cerebral arteries with normal enhancement  Normal anterior communicating artery  MIDDLE CEREBRAL ARTERY CIRCULATION:  M1 segment and middle cerebral artery branches demonstrate normal enhancement bilaterally  DISTAL VERTEBRAL ARTERIES:  Normal distal vertebral arteries  Posterior inferior cerebellar artery origins are normal  Normal vertebral basilar junction  BASILAR ARTERY:  Basilar artery is normal in caliber  Normal superior cerebellar arteries  POSTERIOR CEREBRAL ARTERIES: Both posterior cerebral arteries arises from the basilar tip  Both arteries demonstrate normal enhancement  Normal posterior communicating arteries  DURAL VENOUS SINUSES:  Normal  NON VASCULAR ANATOMY BONY STRUCTURES:  Cervical fusion C3-4 C4-C5 noted with prosthetic intervertebral disc spacers  Orthopedic hardware well seated within bone  Mild spondylotic changes noted  SOFT TISSUES OF THE NECK:  Unremarkable  THORACIC INLET:  Unremarkable  Impression: 1  No hemodynamically significant stenosis in the major arteries of the neck  2   No intracranial aneurysm or major intracranial arterial stenosis  Findings were directly discussed with Dr Monster Ojeda on 9/4/2019 10:20 AM  Workstation performed: [id number]     Code Status: Level 1 - Full Code  Advance Directive and Living Will:      Power of :    POLST:        Assessment:   1  Hypothyroidism - Continue synthroid    2  [facility]      [facility] Problem:    Psychogenic weakness    Present on Admission:  **None**        Plan:  per orders

## 2019-09-06 NOTE — ED NOTES
Called [facility] @ 16:45 for an update - voice mail was left  Call returned @ 17:05 - awaiting orders  Insurance Authorization for admission:   Phone call placed to Korin Engid number: [phone] @17:15  Spoke to Gunjan LAKE  6 days approved  Starting 9/7/19  Level of care: inpt  Review on 9/12/19 with Deirdre Birch [phone]  Authorization # [id number]    Patient is accepted at [facility]  Patient is accepted by Dr Whiteside per Griselda Shiley is arranged with TOYA / Ja Glez  Transportation is scheduled for 00:30  Patient may go to the floor at **  Nurse report is to be called to [phone] prior to patient transfer  Verbal report given to patient's Tiago Bradshaw, @ 18:10   [facility], Lulu Jhaveri, notified of particulars  Patient updated by phone

## 2019-09-06 NOTE — SOCIAL WORK
SW following to assist with DCP  SW followed up with pt to reassess discharge needs and plans  STR placement is being recommended  Pt does feel rehab may be beneficial   Reviewed list of area facilities  Pt has been to rehab at [facility] in the past and prefers to return  Referral has been made to [facility]  Currently there is [facility] bed available [facility] pt [facility]  Authorization request will be initiated with Donna  SW will continue to follow to assist with planning and transfer when ready

## 2019-09-06 NOTE — DISCHARGE SUMMARY
Discharge Summary - Nixon Farias 59 y o  female MRN: [id number]    Unit/Bed#: [address] [address] Encounter: [id number]    Admission Date: 9/3/2019     Admitting Diagnosis: Weakness [R53 1]  Generalized weakness [R53 1]    HPI: generalized weakness getting worse in last 3 days    Procedures Performed:   Orders Placed This Encounter   Procedures    ED ECG Documentation Only       Hospital Course: Ms Adam Layne is 59year old patient had weakness over last 2 months but got worse over last 3 days prior to admission  It was so much that she could not keep eyes open  She was evaluated by ER physician  There was no explanation to her symptoms  She was admitted to hospital  Neuro consult was requested, During her stay, nurse observed facial asymetry and stroke alert was called  She had CT scans done which had no abnormality explaing her symptoms  Her vitals remained stable  She was evaluated by tele psyche  No explanation  Psychiatrist was consulted  Her symptoms have resolved completely now and she is stable to be discharged home with instruction to have follow up with psychiatrist and PCP within 1 week  Significant Findings, Care, Treatment and Services Provided: Telepsyche evaluation, CT brain    Complications: none    Discharge Diagnosis: Weakness presumably psychogenic    Condition at Discharge: Stable    Discharge instructions/Information to patient and family:   See after visit summary for information provided to patient and family  Provisions for Follow-Up Care: F/U with psychiatrist & her PCP in 1 week   See after visit summary for information related to follow-up care and any pertinent home health orders  Disposition : Discharge home to self care    Discharge Statement   I spent 26 minutes discharging the patient  This time was spent on the day of discharge  I had direct contact with the patient on the day of discharge   Additional documentation is required if more than 30 minutes were spent on discharge  Discharge Medications:  See after visit summary for reconciled discharge medications provided to patient and family

## 2019-09-07 ENCOUNTER — HOSPITAL ENCOUNTER (INPATIENT)
Facility: HOSPITAL | Age: 64
LOS: 13 days | Discharge: HOME | DRG: 885 | End: 2019-09-20
Attending: PSYCHIATRY & NEUROLOGY | Admitting: PSYCHIATRY & NEUROLOGY
Payer: COMMERCIAL

## 2019-09-07 DIAGNOSIS — F33.1 MODERATE EPISODE OF RECURRENT MAJOR DEPRESSIVE DISORDER (HCC): ICD-10-CM

## 2019-09-07 DIAGNOSIS — M12.9 ARTHROPATHY OF MULTIPLE SITES: ICD-10-CM

## 2019-09-07 DIAGNOSIS — Z00.8 MEDICAL CLEARANCE FOR PSYCHIATRIC ADMISSION: ICD-10-CM

## 2019-09-07 DIAGNOSIS — Z79.899 MEDICATION MANAGEMENT: Primary | ICD-10-CM

## 2019-09-07 PROBLEM — Z90.49 HISTORY OF CHOLECYSTECTOMY: Status: ACTIVE | Noted: 2019-09-07

## 2019-09-07 PROBLEM — F41.1 GAD (GENERALIZED ANXIETY DISORDER): Status: ACTIVE | Noted: 2019-04-11

## 2019-09-07 PROCEDURE — 99254 IP/OBS CNSLTJ NEW/EST MOD 60: CPT | Performed by: PHYSICIAN ASSISTANT

## 2019-09-07 PROCEDURE — 99223 1ST HOSP IP/OBS HIGH 75: CPT | Performed by: NURSE PRACTITIONER

## 2019-09-07 RX ORDER — LEVOTHYROXINE SODIUM 0.03 MG/1
25 TABLET ORAL
Status: DISCONTINUED | OUTPATIENT
Start: 2019-09-07 | End: 2019-09-20 | Stop reason: HOSPADM

## 2019-09-07 RX ORDER — ACETAMINOPHEN 325 MG/1
650 TABLET ORAL EVERY 4 HOURS PRN
Status: DISCONTINUED | OUTPATIENT
Start: 2019-09-07 | End: 2019-09-20 | Stop reason: HOSPADM

## 2019-09-07 RX ORDER — RISPERIDONE 1 MG/1
1 TABLET, ORALLY DISINTEGRATING ORAL
Status: DISCONTINUED | OUTPATIENT
Start: 2019-09-07 | End: 2019-09-20 | Stop reason: HOSPADM

## 2019-09-07 RX ORDER — DULOXETIN HYDROCHLORIDE 30 MG/1
30 CAPSULE, DELAYED RELEASE ORAL DAILY
Status: DISCONTINUED | OUTPATIENT
Start: 2019-09-07 | End: 2019-09-11

## 2019-09-07 RX ORDER — LORAZEPAM 2 MG/ML
2 INJECTION INTRAMUSCULAR EVERY 4 HOURS PRN
Status: DISCONTINUED | OUTPATIENT
Start: 2019-09-07 | End: 2019-09-20 | Stop reason: HOSPADM

## 2019-09-07 RX ORDER — OLANZAPINE 10 MG/1
5 INJECTION, POWDER, LYOPHILIZED, FOR SOLUTION INTRAMUSCULAR
Status: DISCONTINUED | OUTPATIENT
Start: 2019-09-07 | End: 2019-09-20 | Stop reason: HOSPADM

## 2019-09-07 RX ORDER — GABAPENTIN 300 MG/1
300 CAPSULE ORAL 3 TIMES DAILY
Status: DISCONTINUED | OUTPATIENT
Start: 2019-09-07 | End: 2019-09-12

## 2019-09-07 RX ORDER — B-COMPLEX WITH VITAMIN C
1 TABLET ORAL
Status: DISCONTINUED | OUTPATIENT
Start: 2019-09-08 | End: 2019-09-20 | Stop reason: HOSPADM

## 2019-09-07 RX ORDER — HYDROXYZINE 50 MG/1
50 TABLET, FILM COATED ORAL EVERY 4 HOURS PRN
Status: DISCONTINUED | OUTPATIENT
Start: 2019-09-07 | End: 2019-09-20 | Stop reason: HOSPADM

## 2019-09-07 RX ORDER — TRAZODONE HYDROCHLORIDE 50 MG/1
50 TABLET ORAL
Status: DISCONTINUED | OUTPATIENT
Start: 2019-09-07 | End: 2019-09-20 | Stop reason: HOSPADM

## 2019-09-07 RX ORDER — MAGNESIUM HYDROXIDE/ALUMINUM HYDROXICE/SIMETHICONE 120; 1200; 1200 MG/30ML; MG/30ML; MG/30ML
30 SUSPENSION ORAL EVERY 4 HOURS PRN
Status: DISCONTINUED | OUTPATIENT
Start: 2019-09-07 | End: 2019-09-07

## 2019-09-07 RX ORDER — HALOPERIDOL 5 MG/ML
5 INJECTION INTRAMUSCULAR EVERY 6 HOURS PRN
Status: DISCONTINUED | OUTPATIENT
Start: 2019-09-07 | End: 2019-09-20 | Stop reason: HOSPADM

## 2019-09-07 RX ORDER — ACETAMINOPHEN 325 MG/1
650 TABLET ORAL EVERY 6 HOURS PRN
Status: DISCONTINUED | OUTPATIENT
Start: 2019-09-07 | End: 2019-09-20 | Stop reason: HOSPADM

## 2019-09-07 RX ORDER — MAGNESIUM HYDROXIDE/ALUMINUM HYDROXICE/SIMETHICONE 120; 1200; 1200 MG/30ML; MG/30ML; MG/30ML
30 SUSPENSION ORAL EVERY 4 HOURS PRN
Status: DISCONTINUED | OUTPATIENT
Start: 2019-09-07 | End: 2019-09-20 | Stop reason: HOSPADM

## 2019-09-07 RX ORDER — ACETAMINOPHEN 325 MG/1
975 TABLET ORAL EVERY 6 HOURS PRN
Status: DISCONTINUED | OUTPATIENT
Start: 2019-09-07 | End: 2019-09-20 | Stop reason: HOSPADM

## 2019-09-07 RX ORDER — BENZTROPINE MESYLATE 1 MG/ML
1 INJECTION INTRAMUSCULAR; INTRAVENOUS
Status: DISCONTINUED | OUTPATIENT
Start: 2019-09-07 | End: 2019-09-20 | Stop reason: HOSPADM

## 2019-09-07 RX ORDER — SENNOSIDES 8.6 MG
2 TABLET ORAL
Status: DISCONTINUED | OUTPATIENT
Start: 2019-09-07 | End: 2019-09-13

## 2019-09-07 RX ADMIN — GABAPENTIN 300 MG: 300 CAPSULE ORAL at 16:08

## 2019-09-07 RX ADMIN — GABAPENTIN 300 MG: 300 CAPSULE ORAL at 21:55

## 2019-09-07 RX ADMIN — DULOXETINE HYDROCHLORIDE 30 MG: 30 CAPSULE, DELAYED RELEASE ORAL at 15:13

## 2019-09-07 RX ADMIN — LEVOTHYROXINE SODIUM 25 MCG: 25 TABLET ORAL at 11:18

## 2019-09-07 RX ADMIN — SENNOSIDES 17.2 MG: 8.6 TABLET, FILM COATED ORAL at 21:55

## 2019-09-07 NOTE — NURSING NOTE
Patient is compliant with medications and meals  Appetite is good with patient eating 100% of meals  No complaints of pain or discomfort  Patient did complete ADL's and changed clothes with staff assist and said she felt better after completing these tasks  Patient denies suicidal ideation but reports still being somewhat depressed but has no intent or plan to harm herself  Patient was out of room for meals and interacted with peers when out of room  Patient was then reading a magazine in her room while waiting for the next meal to arrive  No other issues noted at this time and both Psychiatry and PA from [facility] saw patient today  Medications given as ordered

## 2019-09-07 NOTE — ASSESSMENT & PLAN NOTE
· Patient with depression and psychogenic weakness    · Medical workup negative  · Reviewed labs, will check TSH  · Psychiatric management per primary  · Has been recommended to go to short-term rehab  · Will consult PT here

## 2019-09-07 NOTE — ASSESSMENT & PLAN NOTE
· Noted  Patient with occasional right upper quadrant pain after eating  · She states is very slight pain, likely gas related    · Monitor for now - can provide PRN for gas pain   · She does follow with GI provider outpatient

## 2019-09-07 NOTE — PLAN OF CARE
Problem: Potential for Falls  Goal: Patient will remain free of falls  Description  INTERVENTIONS:  - Assess patient frequently for physical needs  -  Identify cognitive and physical deficits and behaviors that affect risk of falls    -  [facility] fall precautions as indicated by assessment   - Educate patient/family on patient safety including physical limitations  - Instruct patient to call for assistance with activity based on assessment  - Modify environment to reduce risk of injury  - Consider OT/PT consult to assist with strengthening/mobility  Outcome: Adequate for Discharge     Problem: MUSCULOSKELETAL - ADULT  Goal: Maintain or return mobility to safest level of function  Description  INTERVENTIONS:  - Assess patient's ability to carry out ADLs; assess patient's baseline for ADL function and identify physical deficits which impact ability to perform ADLs (bathing, care of mouth/teeth, toileting, grooming, dressing, etc )  - Assess/evaluate cause of self-care deficits   - Assess range of motion  - Assess patient's mobility  - Assess patient's need for assistive devices and provide as appropriate  - Encourage maximum independence but intervene and supervise when necessary  - Involve family in performance of ADLs  - Assess for home care needs following discharge   - Consider OT consult to assist with ADL evaluation and planning for discharge  - Provide patient education as appropriate  Outcome: Adequate for Discharge     Problem: PAIN - ADULT  Goal: Verbalizes/displays adequate comfort level or baseline comfort level  Description  Interventions:  - Encourage patient to monitor pain and request assistance  - Assess pain using appropriate pain scale  - Administer analgesics based on type and severity of pain and evaluate response  - Implement non-pharmacological measures as appropriate and evaluate response  - Consider cultural and social influences on pain and pain management  - Notify physician/advanced practitioner if interventions unsuccessful or patient reports new pain  Outcome: Adequate for Discharge     Problem: DISCHARGE PLANNING  Goal: Discharge to home or other facility with appropriate resources  Description  INTERVENTIONS:  - Identify barriers to discharge w/patient and caregiver  - Arrange for needed discharge resources and transportation as appropriate  - Identify discharge learning needs (meds, wound care, etc )  - Arrange for interpretive services to assist at discharge as needed  - Refer to Case Management Department for coordinating discharge planning if the patient needs post-hospital services based on physician/advanced practitioner order or complex needs related to functional status, cognitive ability, or social support system  Outcome: Adequate for Discharge     Problem: Knowledge Deficit  Goal: Patient/family/caregiver demonstrates understanding of disease process, treatment plan, medications, and discharge instructions  Description  Complete learning assessment and assess knowledge base    Interventions:  - Provide teaching at level of understanding  - Provide teaching via preferred learning methods  Outcome: Adequate for Discharge     Problem: Prexisting or High Potential for Compromised Skin Integrity  Goal: Skin integrity is maintained or improved  Description  INTERVENTIONS:  - Identify patients at risk for skin breakdown  - Assess and monitor skin integrity  - Assess and monitor nutrition and hydration status  - Monitor labs   - Assess for incontinence   - Turn and reposition patient  - Assist with mobility/ambulation  - Relieve pressure over bony prominences  - Avoid friction and shearing  - Provide appropriate hygiene as needed including keeping skin clean and dry  - Evaluate need for skin moisturizer/barrier cream  - Collaborate with interdisciplinary team   - Patient/family teaching  - Consider wound care consult   Outcome: Adequate for Discharge Soft, non-tender, no hepatosplenomegaly, normal bowel sounds

## 2019-09-07 NOTE — PLAN OF CARE
Problem: SAFETY ADULT  Goal: Patient will remain free of falls  Description  INTERVENTIONS:  - Assess patient frequently for physical needs  -  Identify cognitive and physical deficits and behaviors that affect risk of falls    -  [place] fall precautions as indicated by assessment   - Educate patient/family on patient safety including physical limitations  - Instruct patient to call for assistance with activity based on assessment  - Modify environment to reduce risk of injury  - Consider OT/PT consult to assist with strengthening/mobility  Outcome: Progressing  Goal: Maintain or return to baseline ADL function  Description  INTERVENTIONS:  -  Assess patient's ability to carry out ADLs; assess patient's baseline for ADL function and identify physical deficits which impact ability to perform ADLs (bathing, care of mouth/teeth, toileting, grooming, dressing, etc )  - Assess/evaluate cause of self-care deficits   - Assess range of motion  - Assess patient's mobility; develop plan if impaired  - Assess patient's need for assistive devices and provide as appropriate  - Encourage maximum independence but intervene and supervise when necessary  - Involve family in performance of ADLs  - Assess for home care needs following discharge   - Consider OT consult to assist with ADL evaluation and planning for discharge  - Provide patient education as appropriate  Outcome: Progressing  Goal: Maintain or return mobility status to optimal level  Description  INTERVENTIONS:  - Assess patient's baseline mobility status (ambulation, transfers, stairs, etc )    - Identify cognitive and physical deficits and behaviors that affect mobility  - Identify mobility aids required to assist with transfers and/or ambulation (gait belt, sit-to-stand, lift, walker, cane, etc )  - [place] fall precautions as indicated by assessment  - Record patient progress and toleration of activity level on Mobility SBAR; progress patient to next Phase/Stage  - Instruct patient to call for assistance with activity based on assessment  - Consider rehabilitation consult to assist with strengthening/weightbearing, etc   Outcome: Progressing     Problem: Depression  Goal: Treatment Goal: Demonstrate behavioral control of depressive symptoms, verbalize feelings of improved mood/affect, and adopt new coping skills prior to discharge  Outcome: Progressing  Goal: Verbalize thoughts and feelings  Description  Interventions:  - Assess and re-assess patient's level of risk   - Engage patient in 1:1 interactions, daily, for a minimum of 15 minutes   - Encourage patient to express feelings, fears, frustrations, hopes   Outcome: Progressing  Goal: Refrain from harming self  Description  Interventions:  - Monitor patient closely, per order   - Supervise medication ingestion, monitor effects and side effects   Outcome: Progressing  Goal: Refrain from isolation  Description  Interventions:  - Develop a trusting relationship   - Encourage socialization   Outcome: Progressing  Goal: Refrain from self-neglect  Outcome: Progressing  Goal: Attend and participate in unit activities, including therapeutic, recreational, and educational groups  Description  Interventions:  - Provide therapeutic and educational activities daily, encourage attendance and participation, and document same in the medical record   Outcome: Progressing  Goal: Complete daily ADLs, including personal hygiene independently, as able  Description  Interventions:  - Observe, teach, and assist patient with ADLS  -  Monitor and promote a balance of rest/activity, with adequate nutrition and elimination   Outcome: Progressing     Problem: Anxiety  Goal: Anxiety is at manageable level  Description  Interventions:  - Assess and monitor patient's anxiety level     - Monitor for signs and symptoms (heart palpitations, chest pain, shortness of breath, headaches, nausea, feeling jumpy, restlessness, irritable, apprehensive)  - Collaborate with interdisciplinary team and initiate plan and interventions as ordered    - [place] patient to unit/surroundings  - Explain treatment plan  - Encourage participation in care  - Encourage verbalization of concerns/fears  - Identify coping mechanisms  - Assist in developing anxiety-reducing skills  - Administer/offer alternative therapies  - Limit or eliminate stimulants  Outcome: Progressing

## 2019-09-07 NOTE — TREATMENT PLAN
TREATMENT PLAN REVIEW - [address] 59 y o  1955 female MRN: [id number]    [address] Room / Bed: Sergio Hannah1/[facility] 964-43 Encounter: [id number]        Admit Date/Time:  9/7/2019  1:55 AM    Treatment Team: Attending Provider: Tarsha Lee MD; Registered Nurse: Uriel Pinot RN; Physician Assistant: Sadaf Blanc PA-C; Patient Care Assistant: Ruby Plunkett    Diagnosis: Principal Problem:     Moderate episode of recurrent major depressive disorder (HCC)  Active Problems:    Alcohol dependence in remission ([facility] [address] )    Arthropathy of multiple sites    ELLY (generalized anxiety disorder)    Herniated cervical disc    Psychogenic weakness    History of cholecystectomy    Patient Strengths/Assets: ability for insight, cooperative, communication skills, compliant with medication, good support system, motivation for treatment/growth, patient is on a voluntary commitment      Patient Barriers/Limitations: chronic pain, difficulty adapting    Short Term Goals: decrease in depressive symptoms, decrease in anxiety symptoms, decrease in suicidal thoughts    Long Term Goals: improvement in depression, improvement in anxiety, free of suicidal thoughts    Progress Towards Goals: restarting psychiatric medications as prescribed    Recommended Treatment: medication management, patient medication education, group therapy, milieu therapy, continued Behavioral Health psychiatric evaluation/assessment process     Treatment Frequency: daily medication monitoring, group and milieu therapy daily, monitoring through interdisciplinary rounds, monitoring through weekly patient care conferences    Expected Discharge Date: 3 days - 9/10/2019    Discharge Plan: return to previous living arrangement    Treatment Plan Created/Updated By: SHRAVAN Machado

## 2019-09-07 NOTE — ASSESSMENT & PLAN NOTE
· Patient with ambulatory dysfunction weakness, secondary to osteoarthritis of left hip and left knee    Also with cervical spinal pain  · Has been recommended for short-term rehab  · Will consult PT while here  · Check TSH

## 2019-09-07 NOTE — PLAN OF CARE
Problem: SAFETY ADULT  Goal: Patient will remain free of falls  Description  INTERVENTIONS:  - Assess patient frequently for physical needs  -  Identify cognitive and physical deficits and behaviors that affect risk of falls    -  [place] fall precautions as indicated by assessment   - Educate patient/family on patient safety including physical limitations  - Instruct patient to call for assistance with activity based on assessment  - Modify environment to reduce risk of injury  - Consider OT/PT consult to assist with strengthening/mobility  Outcome: Not Progressing  Goal: Maintain or return to baseline ADL function  Description  INTERVENTIONS:  -  Assess patient's ability to carry out ADLs; assess patient's baseline for ADL function and identify physical deficits which impact ability to perform ADLs (bathing, care of mouth/teeth, toileting, grooming, dressing, etc )  - Assess/evaluate cause of self-care deficits   - Assess range of motion  - Assess patient's mobility; develop plan if impaired  - Assess patient's need for assistive devices and provide as appropriate  - Encourage maximum independence but intervene and supervise when necessary  - Involve family in performance of ADLs  - Assess for home care needs following discharge   - Consider OT consult to assist with ADL evaluation and planning for discharge  - Provide patient education as appropriate  Outcome: Not Progressing  Goal: Maintain or return mobility status to optimal level  Description  INTERVENTIONS:  - Assess patient's baseline mobility status (ambulation, transfers, stairs, etc )    - Identify cognitive and physical deficits and behaviors that affect mobility  - Identify mobility aids required to assist with transfers and/or ambulation (gait belt, sit-to-stand, lift, walker, cane, etc )  - [place] fall precautions as indicated by assessment  - Record patient progress and toleration of activity level on Mobility SBAR; progress patient to next Phase/Stage  - Instruct patient to call for assistance with activity based on assessment  - Consider rehabilitation consult to assist with strengthening/weightbearing, etc   Outcome: Not Progressing     Problem: Depression  Goal: Treatment Goal: Demonstrate behavioral control of depressive symptoms, verbalize feelings of improved mood/affect, and adopt new coping skills prior to discharge  Outcome: Not Progressing  Goal: Verbalize thoughts and feelings  Description  Interventions:  - Assess and re-assess patient's level of risk   - Engage patient in 1:1 interactions, daily, for a minimum of 15 minutes   - Encourage patient to express feelings, fears, frustrations, hopes   Outcome: Not Progressing  Goal: Refrain from harming self  Description  Interventions:  - Monitor patient closely, per order   - Supervise medication ingestion, monitor effects and side effects   Outcome: Not Progressing  Goal: Refrain from isolation  Description  Interventions:  - Develop a trusting relationship   - Encourage socialization   Outcome: Not Progressing  Goal: Refrain from self-neglect  Outcome: Not Progressing  Goal: Attend and participate in unit activities, including therapeutic, recreational, and educational groups  Description  Interventions:  - Provide therapeutic and educational activities daily, encourage attendance and participation, and document same in the medical record   Outcome: Not Progressing  Goal: Complete daily ADLs, including personal hygiene independently, as able  Description  Interventions:  - Observe, teach, and assist patient with ADLS  -  Monitor and promote a balance of rest/activity, with adequate nutrition and elimination   Outcome: Not Progressing     Problem: Anxiety  Goal: Anxiety is at manageable level  Description  Interventions:  - Assess and monitor patient's anxiety level     - Monitor for signs and symptoms (heart palpitations, chest pain, shortness of breath, headaches, nausea, feeling jumpy, restlessness, irritable, apprehensive)  - Collaborate with interdisciplinary team and initiate plan and interventions as ordered    - [place] patient to unit/surroundings  - Explain treatment plan  - Encourage participation in care  - Encourage verbalization of concerns/fears  - Identify coping mechanisms  - Assist in developing anxiety-reducing skills  - Administer/offer alternative therapies  - Limit or eliminate stimulants  Outcome: Not Progressing

## 2019-09-07 NOTE — CONSULTS
Consult- Deborah Whitlock 1955, 59 y o  female MRN: [id number]  Unit/Bed#: [facility] 869-34 Encounter: [id number]  Primary Care Provider: Shyam Atwood MD   Date and time admitted to hospital: 9/7/2019  1:55 AM    Inpatient consult for Medical Clearance for [facility] patient  Consult performed by: Kalpana Angela PA-C  Consult ordered by: Cholo Lala MD        * Depression  Assessment & Plan  · Patient with depression and psychogenic weakness  · Medical workup negative  · Reviewed labs, will check TSH  · Psychiatric management per primary  · Has been recommended to go to short-term rehab  · Will consult PT here    Psychogenic weakness  Assessment & Plan  · Patient with ambulatory dysfunction weakness, secondary to osteoarthritis of left hip and left knee  Also with cervical spinal pain  · Has been recommended for short-term rehab  · Will consult PT while here  · Check TSH    Herniated cervical disc  Assessment & Plan  Noted  Consult PT    Arthropathy of multiple sites  Assessment & Plan  Patient with left hip and left knee osteoarthritis  Has been recommended for short-term rehab  Consult PT    History of cholecystectomy  Assessment & Plan  · Noted  Patient with occasional right upper quadrant pain after eating  · She states is very slight pain, likely gas related  · Monitor for now - can provide PRN for gas pain   · She does follow with GI provider outpatient          VTE Prophylaxis: Reason for no pharmacologic prophylaxis Low risk  / reason for no mechanical VTE prophylaxis Patient ambulatory     Recommendations for Discharge:  · Discharge to short-term rehab  · Can follow with GI if abdominal/gas pain persists    Counseling / Coordination of Care Time: 30 minutes  Greater than 50% of total time spent on patient counseling and coordination of care  Collaboration of Care:  Were Recommendations Directly Discussed with Primary Treatment Team? - No     History of Present Illness:    Deborah Whitlock is a 59 y o  female who [facility] due to depression  We are consulted for medical management  Patient states she has felt weak over the last few months  This has led to recurrent hospitalizations with negative workup  On prior hospitalization, she admitted to staff feelings of depression  PT/OT at that time recommended discharge to short-term rehab  Review of Systems:    Review of Systems   Constitutional: Positive for activity change  Negative for appetite change, chills, diaphoresis, fatigue, fever and unexpected weight change  HENT: Negative for congestion, dental problem, facial swelling, hearing loss, rhinorrhea, sinus pressure, sneezing and voice change  Eyes: Negative for pain, discharge, redness, itching and visual disturbance  Respiratory: Negative for apnea, cough, choking, chest tightness, shortness of breath, wheezing and stridor  Cardiovascular: Negative for chest pain, palpitations and leg swelling  Gastrointestinal: Negative for abdominal distention, abdominal pain, anal bleeding, blood in stool, constipation, diarrhea, nausea and vomiting  Endocrine: Negative for cold intolerance and heat intolerance  Genitourinary: Negative for difficulty urinating, dysuria and flank pain  Musculoskeletal: Positive for arthralgias, back pain and gait problem  Negative for joint swelling and neck stiffness  Allergic/Immunologic: Negative for environmental allergies and food allergies  Neurological: Positive for weakness  Negative for dizziness, tremors, seizures, syncope, facial asymmetry, speech difficulty, light-headedness, numbness and headaches  Hematological: Negative for adenopathy  Does not bruise/bleed easily  Psychiatric/Behavioral: Negative for agitation         Past Medical and Surgical History:     Past Medical History:   Diagnosis Date    Anesthesia complication     difficult to wake up    Disease of thyroid gland     Fibromyalgia     GERD (gastroesophageal reflux disease)     Lazy eye     resolved: 3/27/17    Osteopenia     with joint pain-elevated DEV    Tinnitus     Wears glasses     for reading       Past Surgical History:   Procedure Laterality Date    ABDOMINAL SURGERY      lysis of adhesions x 2    APPENDECTOMY      CERVICAL FUSION      CHOLECYSTECTOMY      open    DILATION AND CURETTAGE OF UTERUS      NEUROMA EXCISION Right 5/26/2017    Procedure: EXCISION MASS / FIBROMA FOOT;  Surgeon: Jesus Yu DPM;  Location: [facility];  Service:     RIGHT OOPHORECTOMY      WISDOM TOOTH EXTRACTION      x4       Meds/Allergies:    all medications and allergies reviewed    Allergies: Allergies   Allergen Reactions    Demerol [Meperidine] Lightheadedness     Reaction Date: 11Jan2006;     Sulfa Antibiotics Hives     Reaction Date: 11Jan2006;        Social History:     Marital Status: /Civil Union    Substance Use History:   Social History     Substance and Sexual Activity   Alcohol Use Not Currently    Frequency: Never    Binge frequency: Never     Social History     Tobacco Use   Smoking Status Never Smoker   Smokeless Tobacco Never Used     Social History     Substance and Sexual Activity   Drug Use No       Family History:    non-contributory    Physical Exam:     Vitals:   Blood Pressure: 106/58 (09/07/19 0706)  Pulse: 68 (09/07/19 0706)  Temperature: 97 8 °F (36 6 °C) (09/07/19 0706)  Temp Source: Temporal (09/07/19 0706)  Respirations: 16 (09/07/19 0706)  Weight - Scale: 70 8 kg (156 lb) (09/07/19 0140)  SpO2: 97 % (09/07/19 0706)    Physical Exam   Constitutional: She is oriented to person, place, and time  She appears well-developed and well-nourished  No distress  HENT:   Head: Normocephalic and atraumatic  Eyes: Right eye exhibits no discharge  Left eye exhibits no discharge  No scleral icterus  Cardiovascular: Normal rate and regular rhythm  Exam reveals no gallop and no friction rub     No murmur heard   Pulmonary/Chest: Effort normal and breath sounds normal  No respiratory distress  She has no wheezes  She has no rales  Abdominal: Soft  Bowel sounds are normal  She exhibits no distension  There is no tenderness  Neurological: She is alert and oriented to person, place, and time  No cranial nerve deficit ( nonfocal neurological exam  Cranial nerves 2-12 normal )  Skin: Skin is warm and dry  Nursing note and vitals reviewed  Additional Data:     Lab Results: I have personally reviewed pertinent reports  Results from last 7 days   Lab Units 09/03/19  0902   WBC Thousand/uL 6 00   HEMOGLOBIN g/dL 13 4   HEMATOCRIT % 41 8   PLATELETS Thousands/uL 242   NEUTROS PCT % 65   LYMPHS PCT % 21   MONOS PCT % 9   EOS PCT % 4     Results from last 7 days   Lab Units 09/04/19  0536   SODIUM mmol/L 140   POTASSIUM mmol/L 4 7   CHLORIDE mmol/L 106   CO2 mmol/L 27   BUN mg/dL 11   CREATININE mg/dL 0 74   ANION GAP mmol/L 7   CALCIUM mg/dL 7 9*   ALBUMIN g/dL 2 7*   TOTAL BILIRUBIN mg/dL 0 30   ALK PHOS U/L 65   ALT U/L 20   AST U/L 28   GLUCOSE RANDOM mg/dL 105     Results from last 7 days   Lab Units 09/03/19  0902   INR  0 94     Results from last 7 days   Lab Units 09/03/19  0902   TROPONIN I ng/mL <0 02     Lab Results   Component Value Date/Time    HGBA1C 6 0 02/14/2019 05:36 AM    HGBA1C 6 0 03/04/2018 11:50 AM     Results from last 7 days   Lab Units 09/04/19  0957   POC GLUCOSE mg/dl 146*     Results from last 7 days   Lab Units 09/03/19  0902   LACTIC ACID mmol/L 1 6       Imaging: I have personally reviewed pertinent reports  No orders to display       EKG, Pathology, and Other Studies Reviewed on Admission:   · EKG: reviewed    ** Please Note: This note has been constructed using a voice recognition system   **

## 2019-09-07 NOTE — ASSESSMENT & PLAN NOTE
Patient with left hip and left knee osteoarthritis  Has been recommended for short-term rehab  Consult PT

## 2019-09-07 NOTE — NURSING NOTE
Patient admitted from [facility]  Patient was there for work up for weakness  Transferred to  for increased depression and passive intermittent suicidal thoughts  Patient is alert and oriented  She is guarded during intake however cooperative  Patient oriented to the unit  q 7 minute checks

## 2019-09-07 NOTE — H&P
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 voluntary commitment     Patient Barriers: chronic pain, difficulty adapting    Treatment Plan:     Planned Treatment and Medication Changes: All current active medications have been reviewed  Encourage group therapy, milieu therapy and occupational therapy  Behavioral Health checks every 7 minutes  Patient on a 201 voluntary commitment   Restart Cymbalta 30 mg p o   Daily  Restart Atarax 50 mg PO Q 4 hrs PRN    Current Facility-Administered Medications:  acetaminophen 650 mg Oral Q6H PRN Darvin Khanna MD   acetaminophen 650 mg Oral Q4H PRN Darvin Khanna MD   acetaminophen 975 mg Oral Q6H PRN Darvin Khanna MD   aluminum-magnesium hydroxide-simethicone 30 mL Oral Q4H PRN Marisol Junior PA-C   benztropine 1 mg Intramuscular Q1H PRN Gemini Caraballo MD   [START ON 9/8/2019] calcium carbonate-vitamin D 1 tablet Oral Daily With Breakfast Marisol Junior PA-C   DULoxetine 30 mg Oral Daily Hayes SHRAVAN Rees   gabapentin 300 mg Oral TID Marisol Junior PA-C   haloperidol lactate 5 mg Intramuscular Q6H PRN Darvin Khanna MD   hydrOXYzine HCL 50 mg Oral Q4H PRN Gemini Caraballo MD levothyroxine 25 mcg Oral Early Morning Marisol Junior PA-C   LORazepam 2 mg Intramuscular Q4H PRN Darvin Khanna MD   magnesium hydroxide 30 mL Oral Daily PRN Darvin Khanna MD   OLANZapine 5 mg Intramuscular Q3H PRN Gemini Caraballo MD   risperiDONE 1 mg Oral Q3H PRN Gemini Caraballo MD   senna 2 tablet Oral HS Marisol Junior PA-C   traZODone 50 mg Oral HS PRN Gemini Caraballo MD       Risks / Benefits of Treatment:    Risks, benefits, and possible side effects of medications explained to patient and patient verbalizes understanding and agreement for treatment  Counseling / Coordination of Care:    Patient's presentation on admission and proposed treatment plan discussed with treatment team   Diagnosis, medication changes and treatment plan reviewed with patient  Stressors preceding admission discussed with patient including health issues  Coping skills reviewed with patient  Supportive therapy provided to patient  Inpatient Psychiatric Certification:    Estimated length of stay: 3 midnights    Based upon physical, mental and social evaluations, I certify that inpatient psychiatric services are medically necessary for this patient for a duration of 3 midnights for the treatment of Moderate episode of recurrent major depressive disorder ([facility] [address] )    Available alternative community resources do not meet the patient's mental health care needs  I further attest that an established written individualized plan of care has been implemented and is outlined in the patient's medical records

## 2019-09-07 NOTE — PROGRESS NOTES
09/07/19 0900   Activity/Group Checklist   Group Other (Comment)  (Art Therapy Group/Open Choice, Discussion)   Attendance Attended   Attendance Duration (min) 46-60   Interactions Interacted appropriately   Affect/Mood Appropriate   Goals Achieved Identified feelings; Discussed coping strategies; Identified distorted thoughts/beliefs; Able to listen to others; Able to engage in interactions; Able to reflect/comment on own behavior;Able to manage/cope with feelings; Able to recieve feedback; Able to give feedback to another  (Able to engage art materials; full participation)

## 2019-09-08 PROCEDURE — 99232 SBSQ HOSP IP/OBS MODERATE 35: CPT | Performed by: NURSE PRACTITIONER

## 2019-09-08 RX ORDER — LIDOCAINE 50 MG/G
1 PATCH TOPICAL DAILY
Status: DISCONTINUED | OUTPATIENT
Start: 2019-09-08 | End: 2019-09-20 | Stop reason: HOSPADM

## 2019-09-08 RX ADMIN — LIDOCAINE 1 PATCH: 50 PATCH TOPICAL at 15:25

## 2019-09-08 RX ADMIN — GABAPENTIN 300 MG: 300 CAPSULE ORAL at 15:26

## 2019-09-08 RX ADMIN — GABAPENTIN 300 MG: 300 CAPSULE ORAL at 08:30

## 2019-09-08 RX ADMIN — GABAPENTIN 300 MG: 300 CAPSULE ORAL at 21:17

## 2019-09-08 RX ADMIN — DULOXETINE HYDROCHLORIDE 30 MG: 30 CAPSULE, DELAYED RELEASE ORAL at 08:30

## 2019-09-08 RX ADMIN — LEVOTHYROXINE SODIUM 25 MCG: 25 TABLET ORAL at 06:11

## 2019-09-08 RX ADMIN — OYSTER SHELL CALCIUM WITH VITAMIN D 1 TABLET: 500; 200 TABLET, FILM COATED ORAL at 08:30

## 2019-09-08 RX ADMIN — SENNOSIDES 17.2 MG: 8.6 TABLET, FILM COATED ORAL at 21:17

## 2019-09-08 RX ADMIN — ACETAMINOPHEN 975 MG: 325 TABLET ORAL at 09:04

## 2019-09-08 NOTE — NURSING NOTE
Patient remained in her room this evening  No verbalization of SI  Vitals reviewed  Denied any needs  Patient in her bed  Appears to have slept all night  Will continue to monitor on q 7 minute checks

## 2019-09-08 NOTE — NURSING NOTE
Pt pleasant with good appetite and steady gait with rw  VSS  Using tylenol po prn with some positive effect  Constricted affect  Pt with passive death wish with no plan or intent  Monitored for safety and support

## 2019-09-08 NOTE — PROGRESS NOTES
Progress Note - Behavioral Health     Gil iRchardson 59 y o  female MRN: [id number]   Unit/Bed#: [facility] 651-01 Encounter: [id number]      Dawit Ontiveros was seen today for continuation of care, records reviewed, patient was discussed with her nurse  Per nursing staff patient denied SI or HI, she slept through the night per report  Patient is somewhat tearful during interview today, I hope I made the right choice coming in, I need to work on my issues and I know that    Patient reports that she gets nervous with quiet time and on the weekend there is a lot of quiet time    Patient reports that she enjoyed art therapy yesterday  She reports her depression at a 6/10 anxiety at a 5/10, 10 being the worst she continues to report left hip pain and she feels that Tylenol helps somewhat a lidocaine patch was discussed and ordered  Patient denies any SI or HI, denies auditory and visual hallucinations  She reports that she had improved sleep, she reports having 1 very vivid dream and feels that this is related to some of her control issues      Sleep: improved  Appetite: normal  Medication side effects: No   ROS: reports hip pain    Mental Status Evaluation:    Appearance:  age appropriate, casually dressed   Behavior:  cooperative, good eye contact   Speech:  soft   Mood:  depressed, anxious   Affect:  tearful   Thought Process:  organized, linear   Associations: intact associations   Thought Content:  no overt delusions   Perceptual Disturbances: no auditory hallucinations, no visual hallucinations   Risk Potential: Suicidal ideation - None at present  Homicidal ideation - None  Potential for aggression - No   Sensorium:  oriented to person, place and time/date   Memory:  recent and remote memory grossly intact   Consciousness:  alert and awake   Attention: attention span and concentration are age appropriate   Insight:  moderate   Judgment: moderate   Gait/Station: slow gait, uses walker   Motor Activity: Left leg tremor     Vital signs in last 24 hours:    Temp:  [97 3 °F (36 3 °C)-98 6 °F (37 °C)] 97 9 °F (36 6 °C)  HR:  [67-82] 78  Resp:  [16-18] 16  BP: (104-140)/(56-69) 104/56    Laboratory results:    I have personally reviewed all pertinent laboratory/tests results  Most Recent Labs:   Lab Results   Component Value Date    WBC 6 00 09/03/2019    RBC 4 90 09/03/2019    HGB 13 4 09/03/2019    HCT 41 8 09/03/2019     09/03/2019    RDW 13 4 09/03/2019    NEUTROABS 3 95 09/03/2019    SODIUM 140 09/04/2019    K 4 7 09/04/2019     09/04/2019    CO2 27 09/04/2019    BUN 11 09/04/2019    CREATININE 0 74 09/04/2019    GLUCOSE 95 08/04/2016    GLUC 105 09/04/2019    GLUF 105 (H) 09/04/2019    CALCIUM 7 9 (L) 09/04/2019    AST 28 09/04/2019    ALT 20 09/04/2019    ALKPHOS 65 09/04/2019    TP 6 0 (L) 09/04/2019    ALB 2 7 (L) 09/04/2019    TBILI 0 30 09/04/2019    CHOLESTEROL 222 (H) 02/14/2019    HDL 34 (L) 02/14/2019    TRIG 282 (H) 02/14/2019    LDLCALC 132 (H) 02/14/2019    DNK1YDTIFABH 3 404 09/03/2019    HGBA1C 6 0 02/14/2019     02/14/2019       Progress Toward Goals: no significant improvement    Assessment/Plan   Principal Problem: Moderate episode of recurrent major depressive disorder (HCC)  Active Problems:    Alcohol dependence in remission (HCC)    Arthropathy of multiple sites    ELLY (generalized anxiety disorder)    Herniated cervical disc    Psychogenic weakness    History of cholecystectomy    Recommended Treatment:     Planned medication and treatment changes: All current active medications have been reviewed  Encourage group therapy, milieu therapy and occupational therapy  Behavioral Health checks every 7 minutes  Voluntary 201 commitment  Continue Cymbalta 30 milligrams p o  Daily  Continue Neurontin 300 milligrams p o  T i d    Add lidocaine patch for left hip pain  Continue all other medications:    Current Facility-Administered Medications:  acetaminophen 650 mg Oral Q6H PRN Ashok Cali MD   acetaminophen 650 mg Oral Q4H PRN Ashok Cali MD   acetaminophen 975 mg Oral Q6H PRN Darvin Khanna MD   aluminum-magnesium hydroxide-simethicone 30 mL Oral Q4H PRN Marisol Junior PA-C   benztropine 1 mg Intramuscular Q1H PRN Ashok Cali MD   calcium carbonate-vitamin D 1 tablet Oral Daily With Breakfast Marisol Junior PA-C   DULoxetine 30 mg Oral Daily SHRAVAN French   gabapentin 300 mg Oral TID Marisol Junior PA-C   haloperidol lactate 5 mg Intramuscular Q6H PRN Darvin Khanna MD   hydrOXYzine HCL 50 mg Oral Q4H PRN Ashok Cali MD   levothyroxine 25 mcg Oral Early Morning Marisol Junior PA-C   lidocaine 1 patch Topical Daily SHRAVAN Graff   LORazepam 2 mg Intramuscular Q4H PRN Darvin Khanna MD   magnesium hydroxide 30 mL Oral Daily PRN Darvin Khanna MD   OLANZapine 5 mg Intramuscular Q3H PRN Ashok Cali MD   risperiDONE 1 mg Oral Q3H PRN Ashok Cali MD   senna 2 tablet Oral HS Marisol Junior PA-C   traZODone 50 mg Oral HS PRN Ashok Cali MD       Risks / Benefits of Treatment:    Risks, benefits, and possible side effects of medications explained to patient and patient verbalizes understanding and agreement for treatment  Counseling / Coordination of Care:    Patient's progress reviewed with nursing staff  Medications, treatment progress and treatment plan reviewed with patient

## 2019-09-09 PROCEDURE — G8979 MOBILITY GOAL STATUS: HCPCS

## 2019-09-09 PROCEDURE — 97163 PT EVAL HIGH COMPLEX 45 MIN: CPT

## 2019-09-09 PROCEDURE — G8978 MOBILITY CURRENT STATUS: HCPCS

## 2019-09-09 RX ADMIN — GABAPENTIN 300 MG: 300 CAPSULE ORAL at 15:58

## 2019-09-09 RX ADMIN — GABAPENTIN 300 MG: 300 CAPSULE ORAL at 08:24

## 2019-09-09 RX ADMIN — ACETAMINOPHEN 975 MG: 325 TABLET ORAL at 21:13

## 2019-09-09 RX ADMIN — DULOXETINE HYDROCHLORIDE 30 MG: 30 CAPSULE, DELAYED RELEASE ORAL at 08:24

## 2019-09-09 RX ADMIN — OYSTER SHELL CALCIUM WITH VITAMIN D 1 TABLET: 500; 200 TABLET, FILM COATED ORAL at 08:24

## 2019-09-09 RX ADMIN — GABAPENTIN 300 MG: 300 CAPSULE ORAL at 21:16

## 2019-09-09 RX ADMIN — SENNOSIDES 17.2 MG: 8.6 TABLET, FILM COATED ORAL at 21:15

## 2019-09-09 RX ADMIN — LEVOTHYROXINE SODIUM 25 MCG: 25 TABLET ORAL at 05:54

## 2019-09-09 RX ADMIN — LIDOCAINE 1 PATCH: 50 PATCH TOPICAL at 08:24

## 2019-09-09 NOTE — PLAN OF CARE
Problem: PHYSICAL THERAPY ADULT  Goal: Performs mobility at highest level of function for planned discharge setting  See evaluation for individualized goals  Description  Treatment/Interventions: Functional transfer training, LE strengthening/ROM, Elevations, Therapeutic exercise, Endurance training, Patient/family training, Equipment eval/education, Bed mobility, Gait training, Spoke to nursing, Spoke to case management  Equipment Recommended: Walker(current use of RW for mobility)       See flowsheet documentation for full assessment, interventions and recommendations  Note:   Prognosis: Good  Problem List: Decreased strength, Decreased range of motion, Decreased endurance, Impaired balance, Decreased mobility, Decreased coordination, Decreased skin integrity, Pain  Assessment: pt is a 58 y/o female admitted to [facility] 2* depression,weakness and BLE pain (L>R)  Pt lives with  in ranch style home,1 "steep" RODOLFO,reports laundry located in basement,owns [facility] and RW,reports being completely (I) PTA,reports  is at work during the day leaving pt alone during the day PTA  Pt currently is not at functional mobility baseline,needs Ax1 with use of RW for mobility,inc pain,ataxic and unsteady gait pattern,slow mitchel,ongoing medical care and medicine management  Pt demonstrates minimal deficits during functional mobility and gait including dec endurance,dec BLE strength,dec balance,inc pain,ataxic and unsteady gait pattern and needs minAx1 for transfers and gait with use of RW  Pt would cont to benefit from skilled inpt PT services to maximize functional independence  Barriers to Discharge: Decreased caregiver support, Inaccessible home environment(alone during the day,1 RODOLFO,laundry in basement)     Recommendation: Short-term skilled PT(inpt skilled rehab upon D/C)          See flowsheet documentation for full assessment

## 2019-09-09 NOTE — PROGRESS NOTES
Pt  attended open discussion group  Pt anxious and blunt affect  Pt noted that she had depressive thoughts prior to coming and mentioned how it affected her interactions with her family  She also noted her concerns about finding the right medication management needs  Pt was respectful with peers  Continue to provide therapeutic group support  09/09/19 0826   Activity/Group Checklist   Group Other (Comment)  (open discussion)   Attendance Attended   Attendance Duration (min) 31-45   Interactions Interacted appropriately   Affect/Mood Other (Comment)  (anxious)   Goals Achieved Identified feelings; Discussed coping strategies; Able to listen to others; Able to engage in interactions; Able to reflect/comment on own behavior;Able to manage/cope with feelings;Verbalized increased hopefulness; Able to self-disclose; Able to recieve feedback; Able to give feedback to another;Able to experience relief/decrease in symptoms

## 2019-09-09 NOTE — PROGRESS NOTES
Psychiatry Progress Note    Subjective: Interval History     The patient is visible on the unit  She has been attending group activities  Patient is pleasant during discussion and feels that her medications are helping her with her depression  She states she is focused on her physical health  She has a history of left hip bursitis and osteoarthritis which she feels has been holding her back  She is hoping she can go to short-term rehab  Physical therapy was consulted for this  Patient states she did not feel well and she was on amitriptyline and states this was given to her at the same time as her new thyroid medication  She states she felt "crazy" on this and not herself  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  She enjoys which she does but feels that her physical health is holding her back  She has been medication and meal compliant  She is eating well  PT consulted       Behavior over the last 24 hours: unchanged  Sleep: normal  Appetite: normal  Medication side effects: No  ROS: no complaints    Current medications:    Current Facility-Administered Medications:     acetaminophen (TYLENOL) tablet 650 mg, 650 mg, Oral, Q6H PRN, Berenice Sanchez MD    acetaminophen (TYLENOL) tablet 650 mg, 650 mg, Oral, Q4H PRN, Berenice Sanchez MD    acetaminophen (TYLENOL) tablet 975 mg, 975 mg, Oral, Q6H PRN, Berenice Sanchez MD, 975 mg at 09/08/19 0904    aluminum-magnesium hydroxide-simethicone (MYLANTA) 200-200-20 mg/5 mL oral suspension 30 mL, 30 mL, Oral, Q4H PRN, Marisol Junior PA-C    benztropine (COGENTIN) injection 1 mg, 1 mg, Intramuscular, Q1H PRN, Berenice Sanchez MD    calcium carbonate-vitamin D (OSCAL-D) 500 mg-200 units per tablet 1 tablet, 1 tablet, Oral, Daily With Breakfast, Marisol Junior PA-C, 1 tablet at 09/09/19 0824    DULoxetine (CYMBALTA) delayed release capsule 30 mg, 30 mg, Oral, Daily, SHRAVAN Zavaleta, 30 mg at 09/09/19 0824   gabapentin (NEURONTIN) capsule 300 mg, 300 mg, Oral, TID, Marisol Junior PA-C, 300 mg at 09/09/19 3046    haloperidol lactate (HALDOL) injection 5 mg, 5 mg, Intramuscular, Q6H PRN, David Stoll MD    hydrOXYzine HCL (ATARAX) tablet 50 mg, 50 mg, Oral, Q4H PRN, David Stoll MD    levothyroxine tablet 25 mcg, 25 mcg, Oral, Early Morning, Marisol Junior PA-C, 25 mcg at 09/09/19 0554    lidocaine (LIDODERM) 5 % patch 1 patch, 1 patch, Topical, Daily, SHRAVAN Moreno, 1 patch at 09/09/19 0824    LORazepam (ATIVAN) 2 mg/mL injection 2 mg, 2 mg, Intramuscular, Q4H PRN, David Stoll MD    magnesium hydroxide (MILK OF MAGNESIA) 400 mg/5 mL oral suspension 30 mL, 30 mL, Oral, Daily PRN, Darvin Khanna MD    OLANZapine (ZyPREXA) IM injection 5 mg, 5 mg, Intramuscular, Q3H PRN, David Stoll MD    risperiDONE (RisperDAL M-TABS) dispersible tablet 1 mg, 1 mg, Oral, Q3H PRN, David Stoll MD    senna (SENOKOT) tablet 17 2 mg, 2 tablet, Oral, HS, Marisol Junior PA-C, 17 2 mg at 09/08/19 2117    traZODone (DESYREL) tablet 50 mg, 50 mg, Oral, HS PRN, David Stoll MD    Current Problem List:    Patient Active Problem List   Diagnosis    Alcohol dependence in remission (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[address] )    Focal neurological deficit    GERD (gastroesophageal reflux disease)    Cervical myelopathy with cervical radiculopathy    History of recent intraspinal surgery    Autonomic orthostatic hypotension    History of migraine headaches    Mixed dyslipidemia    Lesion of lachelle    Hypertension    Right sided temporal headache    Fibromyalgia    ELLY (generalized anxiety disorder)    Herniated cervical disc    [facility]    Spinal stenosis    BMI 26 0-26 9,adult    Psychogenic weakness    History of cholecystectomy       Problem list reviewed 09/09/19     Objective:     Vital Signs:  Vitals:    09/08/19 0707 09/08/19 1518 09/08/19 2045 09/09/19 0714   BP: 104/56 110/66 114/69 103/61   BP Location: Left arm Left arm Right arm Left arm   Pulse: 78 98 86 101   Resp: 16 18 18 17   Temp: 97 9 °F (36 6 °C) 98 8 °F (37 1 °C) 98 7 °F (37 1 °C) 98 2 °F (36 8 °C)   TempSrc: Temporal Temporal Temporal Temporal   SpO2: 97% 94% 98% 98%   Weight:             Appearance:  age appropriate and casually dressed   Behavior:  normal   Speech:  normal volume   Mood:  constricted   Affect:  normal   Thought Process:  normal   Thought Content:  normal   Perceptual Disturbances: None   Risk Potential: none   Sensorium:  person, place, situation and time   Cognition:  intact   Consciousness:  alert and awake    Attention: attention span and concentration were age appropriate   Intellect: average   Insight:  good   Judgment: good      Motor Activity: no abnormal movements       I/O Past 24 hours:  I/O last 3 completed shifts: In: 200 [P O :980]  Out: -   I/O this shift: In: 5 [P O :540]  Out: -         Labs:  Reviewed 09/09/19    Progress Toward Goals: depression improved    Assessment / Plan: Moderate episode of recurrent major depressive disorder ([facility] )    Recommended Treatment:      Medication changes:  1) Continue current medication regimen  PT consulted  Non-pharmacological treatments  1) Continue with group therapy, milieu therapy and occupational therapy  Safety  1) Safety/communication plan established targeting dynamic risk factors above      2) Risks, benefits, and possible side effects of medications explained to patient and patient verbalizes understanding  Counseling / Coordination of Care    Total floor / unit time spent today 20 minutes  Greater than 50% of total time was spent with the patient and / or family counseling and / or coordination of care  A description of the counseling / coordination of care  Patient's Rights, confidentiality and exceptions to confidentiality, use of automated medical record, [facility] staff access to medical record, and consent to treatment reviewed      Asa Calderón PA-C

## 2019-09-09 NOTE — PROGRESS NOTES
09/09/19 Allen French   Team Meeting   Meeting Type Tx Team Meeting   Team Members Present   Team Members Present Physician;Nurse;   Physician Team Member Gross   Nursing Team Member [facility] Management Team Member Rebecca    Patient/Family Present   Patient Present Yes   Patient's Family Present No       1  Medication adjustment  2  Relief of depression  3  Free of suicidal thoughts  4  Discharge appointments

## 2019-09-09 NOTE — DISCHARGE SUMMARY
Discharge Summary - Maricarmen Cleaning 59 y o  female MRN: [id number]    Unit/Bed#: [address] [address] Encounter: [id number]    Admission Date: 9/3/2019     Admitting Diagnosis: Weakness [R53 1]  Generalized weakness [R53 1]    HPI: patient admitted with increasing weakness over last 3 days, so much so that she can not do her everyday function  Procedures Performed:   Orders Placed This Encounter   Procedures    ED ECG Documentation Only       Hospital Course: Ms Fausto Henry is 59year old patient had weakness over last 2 months but got worse over last 3 days prior to admission  It was so much that she could not keep eyes open  She was evaluated by ER physician  There was no explanation to her symptoms  She was admitted to hospital  Neuro consult was requested, During her stay, nurse observed facial asymetry and stroke alert was called  She had CT scans done which had no abnormality explaing her symptoms  Her vitals remained stable  She was evaluated by tele psyche  No explanation  Psychiatrist was consulted  Her symptoms have resolved completely  She was scheduled for discharge  She stated that she is not comfortable to go home and do not want to go to nursing home  Dr Avery Cooper evaluated her and she agreed for voluntary commitment to psyche inpatient care  She was scheduled to be discharge when bed available  Significant Findings, Care, Treatment and Services Provided: Teleneuro evaluation    Complications: None    Discharge Diagnosis: Psychogenic Weakness    Condition at Discharge: Stable    Discharge instructions/Information to patient and family:   See after visit summary for information provided to patient and family  Provisions for Follow-Up Care:  See after visit summary for information related to follow-up care and any pertinent home health orders  Disposition: Inpatient psychiatric care facility    Discharge Statement   I spent 25 minutes discharging the patient   This time was spent on the day of discharge  I had direct contact with the patient on the day of discharge  Additional documentation is required if more than 30 minutes were spent on discharge  Discharge Medications:  See after visit summary for reconciled discharge medications provided to patient and family

## 2019-09-09 NOTE — PROGRESS NOTES
09/09/19 1100   Activity/Group Checklist   Group Other (Comment)  (greet the day- "cup of comfort"task)   Attendance Attended   Attendance Duration (min) 31-45   Interactions Interacted appropriately   Affect/Mood Appropriate;Calm;Normal range   Goals Achieved Able to engage in interactions; Identified feelings; Discussed coping strategies

## 2019-09-09 NOTE — PROGRESS NOTES
09/09/19 0940   Team Meeting   Meeting Type Daily Rounds   Team Members Present   Team Members Present Physician;Nurse;; Other (Discipline and Name); Occupational Therapist   Physician Team Member Dr Juan Luis Colby  [address] [facility] Team Member M  [address] [facility] Management Team Member FATEMEH Smallwood   OT Team Member CARLA Briceno   Other (Discipline and Name) MIGUEL Nichole      Reviewed with team, discussed medication, PT consult placed, hx of falls at home, plan to discharge to short term rehab

## 2019-09-09 NOTE — PHYSICAL THERAPY NOTE
Physical Therapy Evaluation:    2 forms of pt ID verified:name,birthdate and pt ID daniela    Patient's Name: Laurann Kanner    Admitting Diagnosis  Depression [F32 9]    Problem List  Patient Active Problem List   Diagnosis    [facility] dependence in remission ([facility] [address] [facility]    Allergic rhinitis    Arthropathy of multiple sites    Moderate episode of recurrent major depressive disorder ([facility] [address] [facility]    Esophagitis    Irritable bowel syndrome    Raynaud's syndrome without gangrene    Osteopenia    Pain in both feet    Lesion of left plantar nerve    Lesion of right plantar nerve    Congenital pes planus    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    Right foot pain    Radiculopathy of lumbar region    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[facility] [address] [facility]    Focal neurological deficit    GERD (gastroesophageal reflux disease)    Cervical myelopathy with cervical radiculopathy    History of recent intraspinal surgery    Autonomic orthostatic hypotension    History of migraine headaches    Mixed dyslipidemia    Lesion of lachelle    Hypertension    Right sided temporal headache    Fibromyalgia    ELLY (generalized anxiety disorder)    Herniated cervical disc    Melanoma ([facility] [address] [facility]    Spinal stenosis    BMI 26 0-26 9,adult    Psychogenic weakness    History of cholecystectomy       Past Medical History  Past Medical History:   Diagnosis Date    Anesthesia complication     difficult to wake up    Disease of thyroid gland     Fibromyalgia     GERD (gastroesophageal reflux disease)     Lazy eye     resolved: 3/27/17    Osteopenia     with joint pain-elevated DEV    Tinnitus     Wears glasses     for reading       Past Surgical History  Past Surgical History:   Procedure Laterality Date    ABDOMINAL SURGERY      lysis of adhesions x 2    APPENDECTOMY      CERVICAL FUSION      CHOLECYSTECTOMY      open    DILATION AND CURETTAGE OF UTERUS      NEUROMA EXCISION Right 5/26/2017    Procedure: EXCISION MASS / FIBROMA FOOT; Surgeon: Precious Modi DPM;  Location: [facility];  Service:     RIGHT OOPHORECTOMY      WISDOM TOOTH EXTRACTION      x4      09/09/19 1310   Note Type   Note type Eval only   Pain Assessment   Pain Assessment 0-10   Pain Score 7   Pain Type Acute pain   Pain Location Leg;Hip   Pain Orientation Bilateral  (L>R)   Hospital Pain Intervention(s) Repositioned; Ambulation/increased activity; Emotional support; Rest   Home Living   Type of Home House  (ranch style home)   Home Layout One level;Stairs to enter with rails; Performs ADLs on one level; Able to live on main level with bedroom/bathroom; Laundry in basement  (1 RODOLFO "steep" per pt)   Home Equipment Cane;Walker  (owns [facility])   Additional Comments pt reports living with  in ranch style home,laundry located in basement area,reports 1 RODOLFO "steep step",owns RW and SPC,reports being alone during the day while  works,(-)drive and reports x1 fall within the past 6 months   Prior Function   Level of [place] Independent with ADLs and functional mobility  (per pt PTA)   Lives With Spouse  (pt reports being alone during the day)   Receives Help From Family  (as needed per pt PTA)   ADL Assistance Independent   IADLs Needs assistance   Falls in the last 6 months 1 to 4   Restrictions/Precautions   Other Precautions Fall Risk;Pain   General   Additional Pertinent History depression,weakness,BLE pain (L>R)   Family/Caregiver Present No   Cognition   Overall Cognitive Status WFL   Arousal/Participation Cooperative   Attention Attends with cues to redirect   Orientation Level Oriented X4   Following Commands Follows one step commands with increased time or repetition  (2* inc pain,ataxic and unsteady gait pattern,slow mobility)   RLE Assessment   RLE Assessment   (at least 3 to 3 plus out of 5 grossly throughout)   LLE Assessment   LLE Assessment   (at least 3 to 3 plus out of 5 grossly throughout)   Coordination   Movements are Fluid and Coordinated 0 Coordination and Movement Description dec BLE hip flexion during swing phase of gait,narrow JEAN-CLAUDE,pain,dec BLE step length,shuffling gait pattern,forward flexed posture   Sensation WFL   Light Touch   RLE Light Touch Grossly intact   LLE Light Touch Grossly intact  (pt reports numbness lateral calf area during mobility/WB)   Bed Mobility   Supine to Sit Unable to assess  (pt in static sit pre and post mobility)   Transfers   Sit to Stand 4  Minimal assistance   Additional items Assist x 1; Armrests; Increased time required;Verbal cues   Stand to Sit 4  Minimal assistance   Additional items Assist x 1; Armrests; Increased time required;Verbal cues  (for safety,education and control descent)   Ambulation/Elevation   Gait pattern Poor UE support; Improper Weight shift; Antalgic;Narrow JEAN-CLAUDE; Forward Flexion; Shuffling; Inconsistent mitchel; Foward flexed; Short stride; Ataxia; Excessively slow   Gait Assistance 4  Minimal assist   Additional items Assist x 1;Verbal cues; Tactile cues   Assistive Device Rolling walker   Distance 20 feet plus 70 feet with use of RW on tile surface;one seated rest break inbetween ambulation distance 2* fatigue and weakness   Balance   Static Sitting Good  (in chair)   Dynamic Sitting Poor +   Static Standing Poor +   Dynamic Standing Poor +   Ambulatory Poor +   Endurance Deficit   Endurance Deficit Yes   Endurance Deficit Description pain,weakness,fatigue,slow mobility,dec activity tolerance   Activity Tolerance   Activity Tolerance Patient limited by fatigue;Patient limited by pain  (fair)   Nurse Made Aware yes   Assessment   Prognosis Good   Problem List Decreased strength;Decreased range of motion;Decreased endurance; Impaired balance;Decreased mobility; Decreased coordination;Decreased skin integrity;Pain   Assessment pt is a 60 y/o female admitted to [facility] 2* depression,weakness and BLE pain (L>R)   Pt lives with  in ranch style home,1 "steep" RODOLFO,reports laundry located in basement,owns [facility] and RW,reports being completely (I) PTA,reports  is at work during the day leaving pt alone during the day PTA  Pt currently is not at functional mobility baseline,needs Ax1 with use of RW for mobility,inc pain,ataxic and unsteady gait pattern,slow mitchel,ongoing medical care and medicine management  Pt demonstrates minimal deficits during functional mobility and gait including dec endurance,dec BLE strength,dec balance,inc pain,ataxic and unsteady gait pattern and needs minAx1 for transfers and gait with use of RW  Pt would cont to benefit from skilled inpt PT services to maximize functional independence  Barriers to Discharge Decreased caregiver support; Inaccessible home environment  (alone during the day,1 RODOLFO,laundry in basement)   Goals   Patient Goals to get stronger and to be able to walk better   [facility] Expiration Date 09/19/19   Short Term Goal #1 in 7-10 days: (1) Pt will be able to ambulate greater than 150 feet with use of RW on various surfaces without LOB and no rest breaks needing S level of A in order to A pt to return to PLOF, (2) activity tolerance:45 mins/45mins, (3) pt will be able to perform sit to stand transfers needing S level of A to and from various surfaces consistently in order to return to PLOF, (4) pt will be able to perform BM needing S->mod (I) level of A to A pt to return to PLOF, (5) (I) with BLE therapeutic ex HEP in various positions to A pt to inc balance,strength,mobility,endurance and to A to dec pain, (6) inc balance 1/2 grade in order to dec fall risk, (7) pt will be able to go up and down 1 step needing minAx1 in order to navigate RODOLFO with use of appropriate DME as able and as needed prior to D/C, (8) cont to provide pt and pt family education for safe D/C planning, (9) inc BLE strength 1/2 to 1 full grade in order to A pt to inc balance,strength,mobility,endurance and to A to dec pain   Treatment Day 0   Plan   Treatment/Interventions Functional transfer training;LLUVIA strengthening/ROM; Elevations; Therapeutic exercise; Endurance training;Patient/family training;Equipment eval/education; Bed mobility;Gait training;Spoke to nursing;Spoke to case management   PT Frequency Other (Comment)  (3-5x/week)   Recommendation   Recommendation Short-term skilled PT  (inpt skilled rehab upon D/C)   Equipment Recommended Walker  (current use of RW for mobility)   Barthel Index   Feeding 10   Bathing 0   Grooming Score 0   Dressing Score 5   Bladder Score 10   Bowels Score 10   Toilet Use Score 5   Transfers (Bed/Chair) Score 10   Mobility (Level Surface) Score 0   Stairs Score 0   Barthel Index Score 50   Skilled PT recommended while in hospital and upon DC to progress pt toward treatment goals             Suri Prieto, PT, DPT@

## 2019-09-09 NOTE — NURSING NOTE
Patient is observed resting in her bed with eyes closed  No signs of distress noted  Will continue to monitor on q 7 minute checks

## 2019-09-09 NOTE — NURSING NOTE
Patient is complaint with medications and meals  No complaints of pain or discomfort  Patient is visible on the unit and interacts with peers without difficulty  PT evaluation done and patient to continue with rolling walker which was adjusted by PT therapist    Recommendation from therapist is to continue physical therapy at another facility or possibly downstairs at [facility] / our [facility] facility  Patient denies suicidal ideation and no attempts at self harm noted  No new issues noted at this time

## 2019-09-09 NOTE — PLAN OF CARE
Problem: SAFETY ADULT  Goal: Patient will remain free of falls  Description  INTERVENTIONS:  - Assess patient frequently for physical needs  -  Identify cognitive and physical deficits and behaviors that affect risk of falls    -  [place] fall precautions as indicated by assessment   - Educate patient/family on patient safety including physical limitations  - Instruct patient to call for assistance with activity based on assessment  - Modify environment to reduce risk of injury  - Consider OT/PT consult to assist with strengthening/mobility  Outcome: Progressing  Goal: Maintain or return to baseline ADL function  Description  INTERVENTIONS:  -  Assess patient's ability to carry out ADLs; assess patient's baseline for ADL function and identify physical deficits which impact ability to perform ADLs (bathing, care of mouth/teeth, toileting, grooming, dressing, etc )  - Assess/evaluate cause of self-care deficits   - Assess range of motion  - Assess patient's mobility; develop plan if impaired  - Assess patient's need for assistive devices and provide as appropriate  - Encourage maximum independence but intervene and supervise when necessary  - Involve family in performance of ADLs  - Assess for home care needs following discharge   - Consider OT consult to assist with ADL evaluation and planning for discharge  - Provide patient education as appropriate  Outcome: Progressing  Goal: Maintain or return mobility status to optimal level  Description  INTERVENTIONS:  - Assess patient's baseline mobility status (ambulation, transfers, stairs, etc )    - Identify cognitive and physical deficits and behaviors that affect mobility  - Identify mobility aids required to assist with transfers and/or ambulation (gait belt, sit-to-stand, lift, walker, cane, etc )  - [place] fall precautions as indicated by assessment  - Record patient progress and toleration of activity level on Mobility SBAR; progress patient to next Phase/Stage  - Instruct patient to call for assistance with activity based on assessment  - Consider rehabilitation consult to assist with strengthening/weightbearing, etc   Outcome: Progressing     Problem: Depression  Goal: Treatment Goal: Demonstrate behavioral control of depressive symptoms, verbalize feelings of improved mood/affect, and adopt new coping skills prior to discharge  Outcome: Progressing  Goal: Verbalize thoughts and feelings  Description  Interventions:  - Assess and re-assess patient's level of risk   - Engage patient in 1:1 interactions, daily, for a minimum of 15 minutes   - Encourage patient to express feelings, fears, frustrations, hopes   Outcome: Progressing  Goal: Refrain from harming self  Description  Interventions:  - Monitor patient closely, per order   - Supervise medication ingestion, monitor effects and side effects   Outcome: Progressing  Goal: Refrain from isolation  Description  Interventions:  - Develop a trusting relationship   - Encourage socialization   Outcome: Progressing  Goal: Refrain from self-neglect  Outcome: Progressing  Goal: Attend and participate in unit activities, including therapeutic, recreational, and educational groups  Description  Interventions:  - Provide therapeutic and educational activities daily, encourage attendance and participation, and document same in the medical record   Outcome: Progressing  Goal: Complete daily ADLs, including personal hygiene independently, as able  Description  Interventions:  - Observe, teach, and assist patient with ADLS  -  Monitor and promote a balance of rest/activity, with adequate nutrition and elimination   Outcome: Progressing     Problem: Anxiety  Goal: Anxiety is at manageable level  Description  Interventions:  - Assess and monitor patient's anxiety level     - Monitor for signs and symptoms (heart palpitations, chest pain, shortness of breath, headaches, nausea, feeling jumpy, restlessness, irritable, apprehensive)  - Collaborate with interdisciplinary team and initiate plan and interventions as ordered    - [place] patient to unit/surroundings  - Explain treatment plan  - Encourage participation in care  - Encourage verbalization of concerns/fears  - Identify coping mechanisms  - Assist in developing anxiety-reducing skills  - Administer/offer alternative therapies  - Limit or eliminate stimulants  Outcome: Progressing     Problem: Ineffective Coping  Goal: Participates in unit activities  Description  Interventions:  - Provide therapeutic environment   - Provide required programming   - Redirect inappropriate behaviors   Outcome: Progressing     Problem: DISCHARGE PLANNING  Goal: Discharge to home or other facility with appropriate resources  Description  INTERVENTIONS:  - Identify barriers to discharge w/patient and caregiver  - Arrange for needed discharge resources and transportation as appropriate  - Identify discharge learning needs (meds, wound care, etc )  - Arrange for interpretive services to assist at discharge as needed  - Refer to Case Management Department for coordinating discharge planning if the patient needs post-hospital services based on physician/advanced practitioner order or complex needs related to functional status, cognitive ability, or social support system  Outcome: Progressing
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
## 2019-09-10 PROCEDURE — 97167 OT EVAL HIGH COMPLEX 60 MIN: CPT

## 2019-09-10 RX ORDER — LIDOCAINE 50 MG/G
1 PATCH TOPICAL DAILY
Qty: 30 PATCH | Refills: 0 | Status: SHIPPED | OUTPATIENT
Start: 2019-09-10 | End: 2019-09-16

## 2019-09-10 RX ORDER — SENNOSIDES 8.6 MG
2 TABLET ORAL
Qty: 60 EACH | Refills: 0 | Status: SHIPPED | OUTPATIENT
Start: 2019-09-10 | End: 2019-09-16 | Stop reason: HOSPADM

## 2019-09-10 RX ORDER — LEVOTHYROXINE SODIUM 0.03 MG/1
25 TABLET ORAL
Qty: 30 TABLET | Refills: 0 | Status: SHIPPED | OUTPATIENT
Start: 2019-09-11 | End: 2019-09-16

## 2019-09-10 RX ORDER — B-COMPLEX WITH VITAMIN C
1 TABLET ORAL
Qty: 30 TABLET | Refills: 0 | Status: SHIPPED | OUTPATIENT
Start: 2019-09-10 | End: 2019-09-16

## 2019-09-10 RX ORDER — GABAPENTIN 300 MG/1
300 CAPSULE ORAL 3 TIMES DAILY
Qty: 90 CAPSULE | Refills: 0 | Status: SHIPPED | OUTPATIENT
Start: 2019-09-10 | End: 2019-09-12 | Stop reason: HOSPADM

## 2019-09-10 RX ORDER — DULOXETIN HYDROCHLORIDE 30 MG/1
30 CAPSULE, DELAYED RELEASE ORAL DAILY
Qty: 30 CAPSULE | Refills: 0 | Status: SHIPPED | OUTPATIENT
Start: 2019-09-10 | End: 2019-09-11 | Stop reason: HOSPADM

## 2019-09-10 RX ADMIN — OYSTER SHELL CALCIUM WITH VITAMIN D 1 TABLET: 500; 200 TABLET, FILM COATED ORAL at 09:00

## 2019-09-10 RX ADMIN — GABAPENTIN 300 MG: 300 CAPSULE ORAL at 17:24

## 2019-09-10 RX ADMIN — LEVOTHYROXINE SODIUM 25 MCG: 25 TABLET ORAL at 06:30

## 2019-09-10 RX ADMIN — DULOXETINE HYDROCHLORIDE 30 MG: 30 CAPSULE, DELAYED RELEASE ORAL at 09:00

## 2019-09-10 RX ADMIN — GABAPENTIN 300 MG: 300 CAPSULE ORAL at 09:00

## 2019-09-10 RX ADMIN — GABAPENTIN 300 MG: 300 CAPSULE ORAL at 22:04

## 2019-09-10 RX ADMIN — SENNOSIDES 17.2 MG: 8.6 TABLET, FILM COATED ORAL at 22:04

## 2019-09-10 RX ADMIN — ACETAMINOPHEN 650 MG: 325 TABLET ORAL at 13:05

## 2019-09-10 RX ADMIN — LIDOCAINE 1 PATCH: 50 PATCH TOPICAL at 09:01

## 2019-09-10 NOTE — PROGRESS NOTES
09/10/19 0900   Team Meeting   Meeting Type Daily Rounds   Team Members Present   Team Members Present Physician;Nurse;; Other (Discipline and Name)  (Pharmacist )   Physician Team Member Dr Penny Fragoso Team Member [facility] Management Team Member Carolina Herrmann    Other (Discipline and Name) Lj Arciniega discussed at treatment rounds today, PT recommends short term rehab, case management will start working on placement

## 2019-09-10 NOTE — NURSING NOTE
Pt visible in milieu  Pt calm, pleasant and cooperative with care  Reports depression and anxiety  Denies SI/HI/AH currently  No new concerns at this time  Pt social among staff and peers  Pt compliant with meal and medication regime at this time  VSS

## 2019-09-10 NOTE — OCCUPATIONAL THERAPY NOTE
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    Right foot pain    Radiculopathy of lumbar region    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[facility] [address] [facility]    Focal neurological deficit    GERD (gastroesophageal reflux disease)    Cervical myelopathy with cervical radiculopathy    History of recent intraspinal surgery    Autonomic orthostatic hypotension    History of migraine headaches    Mixed dyslipidemia    Lesion of lachelle    Hypertension    Right sided temporal headache    Fibromyalgia    ELLY (generalized anxiety disorder)    Herniated cervical disc    Melanoma ([facility] [address] [facility]    Spinal stenosis    BMI 26 0-26 9,adult    Psychogenic weakness    History of cholecystectomy       Past Medical History:   Diagnosis Date    Anesthesia complication     difficult to wake up    Disease of thyroid gland     Fibromyalgia     GERD (gastroesophageal reflux disease)     Lazy eye     resolved: 3/27/17    Osteopenia     with joint pain-elevated DEV    Tinnitus     Wears glasses     for reading       Past Surgical History:   Procedure Laterality Date    ABDOMINAL SURGERY      lysis of adhesions x 2    APPENDECTOMY      CERVICAL FUSION      CHOLECYSTECTOMY      open    DILATION AND CURETTAGE OF UTERUS      NEUROMA EXCISION Right 5/26/2017    Procedure: EXCISION MASS / FIBROMA FOOT;  Surgeon: Precious Modi DPM;  Location: [facility];  Service:     RIGHT OOPHORECTOMY      WISDOM TOOTH EXTRACTION      x4       Subjective: RE: reason for hospitalization: "I was very sad last Friday " Lazaro Rossi stated that she was upset about her physical concerns, how she had been doing well, but then started to have various physical concerns  She stated that she is feeling much better now  Per her record, she was noted to have worsening depression, suicidal ideation, anxiety  Symptoms were noted to worsen 2 weeks ago, gradual worsening course since that time  Stressors included medical problems  Prior Level of Function:  Bushra Farrar stated that she plans to go to short term rehab after this hospitalization and then return to her [address] [facility] home (does have basement) where she lives with her   Bushra Farrar was using a front wheel walker for ambulation, but she stated that when home she had been only using this when weak  She stated that she was doing well for months, was walking independently  She stated that she had been independent in personal care, was working 20 hours/ week  She stated that she was doing much of the household cooking, her  sometimes assisted her due to weakness  She stated that she especially has left sided weakness  She stated that she generally does the laundry, laundry area is in the basement, she tosses dirty laundry down the steps, her  will carry clean laundry upstairs  She stated that there are railings on both sides of the stairs  She stated that she does housekeeping as able, does manage her own medications and takes these as prescribed  She stated that she does still drive but has not been doing this currently due to strength issues  Falls:  She stated that she had 2 falls in the past 6 months  She stated on one occasion she tripped over her dog at night  The other time she slipped  Vision:  She stated that she wears reading glasses  She stated that the vision in her right eye is very poor      Alert & Oriented   She is alert, oriented X4    Hobbies/Interests:  She stated that a lot of her hobbies are very hard for her to do right now  She stated that she likes to garden, tend to her roses, spend time with her granddaughters  She stated that this has been hard for her to be at the hospital because she is used to being very busy at home  Support:   She stated her supports include sister, boss, sponsor with AA (she stated that she has been sober 23 years) women friends, stepdaughter    Pain:  She reports pain in both hips, she rates pain in right hip 4 out of 10, left hip 5 out of 10  She stated that she also has numbness and tingling in the bottom of her left side, pain down the outside of her left extremity  She stated that she takes Tylenol for this  R UE AROM WFL's (slowed movements)  RUE  Strength right hand grasp F+  LUE AROM impaired AROM, particularly in hand function  She was able to actively flex left shoulder eventually with extra time and effort approximately 80 degrees, LUE is weakened, she was unable to actively fist hand, oppose to each digit (basic functional AAROM)  LUE Strength grasp strength is fair at best      Current Level of Function:    Tony Garber does ambulate with front wheel walker at this time (with extra time and effort but no other assistance)  She is able to feed herself, but she stated that she is also cognizant of what she orders due to limited use of left hand (to cut up her food)  She has been able to do her dressing, she was able to remove, don her shoe during interview  She stated that she has been receiving assistance with bathing at this time due to safety concerns  Per daily cares documentation, she was noted to receive minimal assistance for hygiene needs at this time  She was pleasant, personable during interview  Her affect was neutral to positive  She was able to communicate needs, concerns clearly  She stated that she feels OK now, she would like to go to short term rehab ASAP        Work Task Skills:  Task Investment independently attends details of task until completion she was limited with ability to complete lace task in part due to reported visual challenges (she did not have glasses with her)  Problem Solving she reported difficulty seeing lace when attempting to attend to whip stitch lace task  Concentration she was attentive, focussed to interview process  Follows Direction she was able to carry out multistep written directions, 1-2 step verbal demonstrative instructions (when she was able to see activity at hand)  Frustration Tolerance her frustration tolerance was at least fair during assessment even when encountering task challenges  Social Skills:  Dyadic Interaction (eye contact, makes needs known, goal directed conversation)  Eye contact: Good  Quality of Response: Clear and Concise  Goal Directed: Yes  False Beliefs: No       Assessment performed:      Pt is a 59 y o  female seen for OT evaluation s/p admit to [facility] on 9/7/2019 w/ Moderate episode of recurrent major depressive disorder ([facility] [address] )  Comorbidities affecting pt's functional performance at time of assessment include: alcohol dependence in remission, arthropathy of multiple sites, generalized anxiety disorder, herniated cervical disc, psychogenic weakness, hx of cholecystectomy, IBS, Raynoud's syndrome, osteopenia, pain in both feet, lyme arthritis, GERD, autonomic aorthostatic hypotension, history of migraine headaches  Personal factors affecting pt at time of IE include:decreased initiation and engagement , health management  and chronic illness, medical issues, decreased coping skills, decreased life management skills  Prior to admission, pt was living in her home with her   Upon evaluation: Pt requires treatment with consideration of the following deficits impacting occupational performance: decreased ROM, decreased strength, decreased tolerance, impaired initiation, impaired problem solving, increased pain, decreased coping skills and decreased life management skills   Pt to benefit from continued skilled OT tx while in the hospital to address deficits as defined above and maximize level of functional independence w ADL's and functional mobility  Occupational Performance areas to address include: socialization, health maintenance, functional mobility, social participation and coping skills instruction, life management instruction  From OT standpoint, recommendation at time of d/c would be to go to short term rehab prior to returning home (which is what she is requesting)  Patient Goal:  "To stay engaged enough to transfer to short term rehab "    Frequency:  1-2 X/week    Goals:  1  Increase strength and functional status of BUE's (increase strength in both arms to at least good minus)  2  Identify and explore strategies to promote improved functioning and wellness  3  Demonstrate 60 minutes of stable mood during each session  4  Identify 3 positive qualities of self  5  Consistently utilize positive coping skills to deal with life stressors, I e , health demands, without interference from depressed mood and thoughts of self harm  6  Consistently utilize positive life management strategies to promote optimum health and wellness 100% of the time      Goal Expiration:   30 days    Group Recommendations:   Coping skills  Life management  Socialization  Positive focus  Energy conservation  Gardening topics  Pet therapy  Art therapy    Alexa Gomez, OT

## 2019-09-10 NOTE — PROGRESS NOTES
Pt  displayed appropriate enthusiasm during the group task  09/10/19 1000   Activity/Group Checklist   Group Other (Comment)  (Greet the day concentration game on [employer] )   Attendance Attended   Attendance Duration (min) 31-45   Interactions Interacted appropriately   Affect/Mood Appropriate;Bright;Normal range   Goals Achieved Discussed coping strategies; Able to engage in interactions; Able to reflect/comment on own behavior

## 2019-09-10 NOTE — PLAN OF CARE
Problem: SAFETY ADULT  Goal: Patient will remain free of falls  Description  INTERVENTIONS:  - Assess patient frequently for physical needs  -  Identify cognitive and physical deficits and behaviors that affect risk of falls    -  [place] fall precautions as indicated by assessment   - Educate patient/family on patient safety including physical limitations  - Instruct patient to call for assistance with activity based on assessment  - Modify environment to reduce risk of injury  - Consider OT/PT consult to assist with strengthening/mobility  Outcome: Progressing  Goal: Maintain or return to baseline ADL function  Description  INTERVENTIONS:  -  Assess patient's ability to carry out ADLs; assess patient's baseline for ADL function and identify physical deficits which impact ability to perform ADLs (bathing, care of mouth/teeth, toileting, grooming, dressing, etc )  - Assess/evaluate cause of self-care deficits   - Assess range of motion  - Assess patient's mobility; develop plan if impaired  - Assess patient's need for assistive devices and provide as appropriate  - Encourage maximum independence but intervene and supervise when necessary  - Involve family in performance of ADLs  - Assess for home care needs following discharge   - Consider OT consult to assist with ADL evaluation and planning for discharge  - Provide patient education as appropriate  Outcome: Progressing  Goal: Maintain or return mobility status to optimal level  Description  INTERVENTIONS:  - Assess patient's baseline mobility status (ambulation, transfers, stairs, etc )    - Identify cognitive and physical deficits and behaviors that affect mobility  - Identify mobility aids required to assist with transfers and/or ambulation (gait belt, sit-to-stand, lift, walker, cane, etc )  - [place] fall precautions as indicated by assessment  - Record patient progress and toleration of activity level on Mobility SBAR; progress patient to next Phase/Stage  - Instruct patient to call for assistance with activity based on assessment  - Consider rehabilitation consult to assist with strengthening/weightbearing, etc   Outcome: Progressing     Problem: Depression  Goal: Treatment Goal: Demonstrate behavioral control of depressive symptoms, verbalize feelings of improved mood/affect, and adopt new coping skills prior to discharge  Outcome: Progressing  Goal: Verbalize thoughts and feelings  Description  Interventions:  - Assess and re-assess patient's level of risk   - Engage patient in 1:1 interactions, daily, for a minimum of 15 minutes   - Encourage patient to express feelings, fears, frustrations, hopes   Outcome: Progressing  Goal: Refrain from harming self  Description  Interventions:  - Monitor patient closely, per order   - Supervise medication ingestion, monitor effects and side effects   Outcome: Progressing  Goal: Refrain from isolation  Description  Interventions:  - Develop a trusting relationship   - Encourage socialization   Outcome: Progressing  Goal: Refrain from self-neglect  Outcome: Progressing  Goal: Attend and participate in unit activities, including therapeutic, recreational, and educational groups  Description  Interventions:  - Provide therapeutic and educational activities daily, encourage attendance and participation, and document same in the medical record   Outcome: Progressing  Goal: Complete daily ADLs, including personal hygiene independently, as able  Description  Interventions:  - Observe, teach, and assist patient with ADLS  -  Monitor and promote a balance of rest/activity, with adequate nutrition and elimination   Outcome: Progressing     Problem: Anxiety  Goal: Anxiety is at manageable level  Description  Interventions:  - Assess and monitor patient's anxiety level     - Monitor for signs and symptoms (heart palpitations, chest pain, shortness of breath, headaches, nausea, feeling jumpy, restlessness, irritable, apprehensive)  - Collaborate with interdisciplinary team and initiate plan and interventions as ordered    - [place] patient to unit/surroundings  - Explain treatment plan  - Encourage participation in care  - Encourage verbalization of concerns/fears  - Identify coping mechanisms  - Assist in developing anxiety-reducing skills  - Administer/offer alternative therapies  - Limit or eliminate stimulants  Outcome: Progressing     Problem: Ineffective Coping  Goal: Participates in unit activities  Description  Interventions:  - Provide therapeutic environment   - Provide required programming   - Redirect inappropriate behaviors   Outcome: Progressing

## 2019-09-10 NOTE — PROGRESS NOTES
Psychiatry Progress Note    Subjective: Interval History     The patient is lying in bed this morning  She states she did not sleep well because of others on the unit  She states that she was able to hear everything last night and maybe slept for 4 hours  She states medically she is feeling much better  She has been attending group activities  She does continue to medication and meal compliant  She denies feeling depressed  Physical therapy evaluated patient and recommended short-term rehab  Patient states she has some anxiety thinking about this but knows that this is where she needs to go to get stronger  Patient offers no other concerns today      Behavior over the last 24 hours: unchanged  Sleep: normal  Appetite: normal  Medication side effects: No  ROS: no complaints    Current medications:    Current Facility-Administered Medications:     acetaminophen (TYLENOL) tablet 650 mg, 650 mg, Oral, Q6H PRN, Sloan Barry MD    acetaminophen (TYLENOL) tablet 650 mg, 650 mg, Oral, Q4H PRN, Sloan Barry MD    acetaminophen (TYLENOL) tablet 975 mg, 975 mg, Oral, Q6H PRN, Sloan Barry MD, 975 mg at 09/09/19 2113    aluminum-magnesium hydroxide-simethicone (MYLANTA) 200-200-20 mg/5 mL oral suspension 30 mL, 30 mL, Oral, Q4H PRN, Marisol Junior PA-C    benztropine (COGENTIN) injection 1 mg, 1 mg, Intramuscular, Q1H PRN, Sloan Barry MD    calcium carbonate-vitamin D (OSCAL-D) 500 mg-200 units per tablet 1 tablet, 1 tablet, Oral, Daily With Breakfast, Marisol Junior PA-C, 1 tablet at 09/09/19 9120    DULoxetine (CYMBALTA) delayed release capsule 30 mg, 30 mg, Oral, Daily, SHRAVAN Morejon, 30 mg at 09/09/19 3918    gabapentin (NEURONTIN) capsule 300 mg, 300 mg, Oral, TID, Marisol Junior PA-C, 300 mg at 09/09/19 2116    haloperidol lactate (HALDOL) injection 5 mg, 5 mg, Intramuscular, Q6H PRN, Sloan Barry MD    hydrOXYzine HCL (ATARAX) tablet 50 mg, 50 mg, Oral, Q4H PRN, Deniz Burnham MD    levothyroxine tablet 25 mcg, 25 mcg, Oral, Early Morning, Marisol Junior PA-C, 25 mcg at 09/10/19 0630    lidocaine (LIDODERM) 5 % patch 1 patch, 1 patch, Topical, Daily, Ayala Primrose, CRNP, 1 patch at 09/09/19 0824    LORazepam (ATIVAN) 2 mg/mL injection 2 mg, 2 mg, Intramuscular, Q4H PRN, Deniz Burnham MD    magnesium hydroxide (MILK OF MAGNESIA) 400 mg/5 mL oral suspension 30 mL, 30 mL, Oral, Daily PRN, Darvin Khanna MD    OLANZapine (ZyPREXA) IM injection 5 mg, 5 mg, Intramuscular, Q3H PRN, Deniz Burnham MD    risperiDONE (RisperDAL M-TABS) dispersible tablet 1 mg, 1 mg, Oral, Q3H PRN, Deniz Burnham MD    senna (SENOKOT) tablet 17 2 mg, 2 tablet, Oral, HS, Marisol Junior PA-C, 17 2 mg at 09/09/19 2115    traZODone (DESYREL) tablet 50 mg, 50 mg, Oral, HS PRN, Deniz Burnham MD    Current Problem List:    Patient Active Problem List   Diagnosis    Alcohol dependence in remission ([facility] [address] )    Allergic rhinitis    Arthropathy of multiple sites    Moderate episode of recurrent major depressive disorder (HCC)    Esophagitis    Irritable bowel syndrome    Raynaud's syndrome without gangrene    Osteopenia    Pain in both feet    Lesion of left plantar nerve    Lesion of right plantar nerve    Congenital pes planus    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    Right foot pain    Radiculopathy of lumbar region    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[address]    Focal neurological deficit    GERD (gastroesophageal reflux disease)    Cervical myelopathy with cervical radiculopathy    History of recent intraspinal surgery    Autonomic orthostatic hypotension    History of migraine headaches    Mixed dyslipidemia    Lesion of lachelle    Hypertension    Right sided temporal headache    Fibromyalgia    ELLY (generalized anxiety disorder)    Herniated cervical disc    Melanoma ([address]    Spinal stenosis    BMI 26 0-26 9,adult    Psychogenic weakness  History of cholecystectomy       Problem list reviewed 09/10/19     Objective:     Vital Signs:  Vitals:    09/09/19 1530 09/09/19 1655 09/09/19 2030 09/10/19 0644   BP: 106/68 106/68 126/77 101/56   BP Location: Left arm Left arm Right arm Right arm   Pulse: (!) 117 (!) 117 87 68   Resp: 16 16 18 16   Temp: 98 1 °F (36 7 °C) 98 1 °F (36 7 °C) 98 6 °F (37 °C) 98 7 °F (37 1 °C)   TempSrc: Temporal Temporal Temporal Temporal   SpO2: 96%  97% 97%   Weight:  70 8 kg (156 lb)     Height:  5' 4" (1 626 m)           Appearance:  age appropriate and casually dressed   Behavior:  normal   Speech:  normal volume   Mood:  constricted   Affect:  normal   Thought Process:  normal   Thought Content:  normal   Perceptual Disturbances: None   Risk Potential: none   Sensorium:  person, place, situation and time   Cognition:  intact   Consciousness:  alert and awake    Attention: attention span and concentration were age appropriate   Intellect: average   Insight:  good   Judgment: good      Motor Activity: no abnormal movements       I/O Past 24 hours:  I/O last 3 completed shifts: In: 1080 [P O :1080]  Out: -   I/O this shift:  In: 300 [P O :300]  Out: -         Labs:  Reviewed 09/10/19    Progress Toward Goals: depression improved    Assessment / Plan: Moderate episode of recurrent major depressive disorder ([facility] [address] )    Recommended Treatment:      Medication changes:  1) Continue current medication regimen  PT recommended short term rehab  Non-pharmacological treatments  1) Continue with group therapy, milieu therapy and occupational therapy  Safety  1) Safety/communication plan established targeting dynamic risk factors above  2) Risks, benefits, and possible side effects of medications explained to patient and patient verbalizes understanding  Counseling / Coordination of Care    Total floor / unit time spent today 20 minutes   Greater than 50% of total time was spent with the patient and / or family counseling and / or coordination of care  A description of the counseling / coordination of care  Patient's Rights, confidentiality and exceptions to confidentiality, use of automated medical record, [facility] [place] staff access to medical record, and consent to treatment reviewed      Rojelio Gamino PA-C

## 2019-09-10 NOTE — NURSING NOTE
States she slept poorly  Rates depression 1/10 and denies anxiety  [place] peer moving things during the night  Med compliant

## 2019-09-10 NOTE — PROGRESS NOTES
09/10/19 1100   Service   Service SLIM   Provider Name Dr Dony Morley   Multi-disciplinary Rounds   Diagnosis  Reviewed     Spoke about pt's concern about medications that were not ordered (Protonix and Alphagan)

## 2019-09-10 NOTE — NURSING NOTE
Patient was social and visible although superficial when engaging, She is compliant with medications and unit routine  Writer spoke with patient about PT and getting stronger  She c/o bilateral hip pain: 6-7 R and 5 L  Tylenol 975 mg given   Will monitor

## 2019-09-10 NOTE — NURSING NOTE
Awake, alert, oriented  Pleasant and cooperative on approach  Denies any depression at this time  Rates anxiety 1/4  Denies any SI/HI at this time  Denies any AH/VH at this time  Medication compliant  Pt with no complaints at this time  Visible on unit interacting appropriately with staff and peers  Will continue to monitor

## 2019-09-10 NOTE — NURSING NOTE
Monitored on safety checks  Appears to be sleeping calmly  In bed with eyes closed and respirations noted   Voicing no complaints

## 2019-09-10 NOTE — PLAN OF CARE
Problem: SAFETY ADULT  Goal: Patient will remain free of falls  Description  INTERVENTIONS:  - Assess patient frequently for physical needs  -  Identify cognitive and physical deficits and behaviors that affect risk of falls    -  [place] fall precautions as indicated by assessment   - Educate patient/family on patient safety including physical limitations  - Instruct patient to call for assistance with activity based on assessment  - Modify environment to reduce risk of injury  - Consider OT/PT consult to assist with strengthening/mobility  Outcome: Progressing  Goal: Maintain or return to baseline ADL function  Description  INTERVENTIONS:  -  Assess patient's ability to carry out ADLs; assess patient's baseline for ADL function and identify physical deficits which impact ability to perform ADLs (bathing, care of mouth/teeth, toileting, grooming, dressing, etc )  - Assess/evaluate cause of self-care deficits   - Assess range of motion  - Assess patient's mobility; develop plan if impaired  - Assess patient's need for assistive devices and provide as appropriate  - Encourage maximum independence but intervene and supervise when necessary  - Involve family in performance of ADLs  - Assess for home care needs following discharge   - Consider OT consult to assist with ADL evaluation and planning for discharge  - Provide patient education as appropriate  Outcome: Progressing  Goal: Maintain or return mobility status to optimal level  Description  INTERVENTIONS:  - Assess patient's baseline mobility status (ambulation, transfers, stairs, etc )    - Identify cognitive and physical deficits and behaviors that affect mobility  - Identify mobility aids required to assist with transfers and/or ambulation (gait belt, sit-to-stand, lift, walker, cane, etc )  - [place] fall precautions as indicated by assessment  - Record patient progress and toleration of activity level on Mobility SBAR; progress patient to next Phase/Stage  - Instruct patient to call for assistance with activity based on assessment  - Consider rehabilitation consult to assist with strengthening/weightbearing, etc   Outcome: Progressing     Problem: Depression  Goal: Treatment Goal: Demonstrate behavioral control of depressive symptoms, verbalize feelings of improved mood/affect, and adopt new coping skills prior to discharge  Outcome: Progressing  Goal: Verbalize thoughts and feelings  Description  Interventions:  - Assess and re-assess patient's level of risk   - Engage patient in 1:1 interactions, daily, for a minimum of 15 minutes   - Encourage patient to express feelings, fears, frustrations, hopes   Outcome: Progressing  Goal: Refrain from harming self  Description  Interventions:  - Monitor patient closely, per order   - Supervise medication ingestion, monitor effects and side effects   Outcome: Progressing  Goal: Refrain from isolation  Description  Interventions:  - Develop a trusting relationship   - Encourage socialization   Outcome: Progressing  Goal: Refrain from self-neglect  Outcome: Progressing  Goal: Complete daily ADLs, including personal hygiene independently, as able  Description  Interventions:  - Observe, teach, and assist patient with ADLS  -  Monitor and promote a balance of rest/activity, with adequate nutrition and elimination   Outcome: Progressing     Problem: Anxiety  Goal: Anxiety is at manageable level  Description  Interventions:  - Assess and monitor patient's anxiety level  - Monitor for signs and symptoms (heart palpitations, chest pain, shortness of breath, headaches, nausea, feeling jumpy, restlessness, irritable, apprehensive)  - Collaborate with interdisciplinary team and initiate plan and interventions as ordered    - [place] patient to unit/surroundings  - Explain treatment plan  - Encourage participation in care  - Encourage verbalization of concerns/fears  - Identify coping mechanisms  - Assist in developing anxiety-reducing skills  - Administer/offer alternative therapies  - Limit or eliminate stimulants  Outcome: Progressing

## 2019-09-10 NOTE — NURSING NOTE
Medicated with PRN Tylenol as requested and ordered for 6/10 lower back, left hip/leg pain  Will continue to monitor

## 2019-09-11 PROBLEM — R26.2 AMBULATORY DYSFUNCTION: Status: ACTIVE | Noted: 2019-09-11

## 2019-09-11 RX ORDER — DULOXETIN HYDROCHLORIDE 60 MG/1
60 CAPSULE, DELAYED RELEASE ORAL DAILY
Status: DISCONTINUED | OUTPATIENT
Start: 2019-09-12 | End: 2019-09-20 | Stop reason: HOSPADM

## 2019-09-11 RX ORDER — DULOXETIN HYDROCHLORIDE 60 MG/1
60 CAPSULE, DELAYED RELEASE ORAL DAILY
Qty: 30 CAPSULE | Refills: 0 | Status: SHIPPED | OUTPATIENT
Start: 2019-09-12 | End: 2019-09-16

## 2019-09-11 RX ORDER — DULOXETIN HYDROCHLORIDE 30 MG/1
30 CAPSULE, DELAYED RELEASE ORAL ONCE
Status: COMPLETED | OUTPATIENT
Start: 2019-09-11 | End: 2019-09-11

## 2019-09-11 RX ADMIN — LIDOCAINE 1 PATCH: 50 PATCH TOPICAL at 08:20

## 2019-09-11 RX ADMIN — LEVOTHYROXINE SODIUM 25 MCG: 25 TABLET ORAL at 06:06

## 2019-09-11 RX ADMIN — ALUMINUM HYDROXIDE, MAGNESIUM HYDROXIDE, AND SIMETHICONE 30 ML: 200; 200; 20 SUSPENSION ORAL at 11:03

## 2019-09-11 RX ADMIN — OYSTER SHELL CALCIUM WITH VITAMIN D 1 TABLET: 500; 200 TABLET, FILM COATED ORAL at 08:19

## 2019-09-11 RX ADMIN — GABAPENTIN 300 MG: 300 CAPSULE ORAL at 21:25

## 2019-09-11 RX ADMIN — DULOXETINE HYDROCHLORIDE 30 MG: 30 CAPSULE, DELAYED RELEASE ORAL at 08:19

## 2019-09-11 RX ADMIN — ACETAMINOPHEN 650 MG: 325 TABLET ORAL at 08:20

## 2019-09-11 RX ADMIN — GABAPENTIN 300 MG: 300 CAPSULE ORAL at 17:00

## 2019-09-11 RX ADMIN — SENNOSIDES 17.2 MG: 8.6 TABLET, FILM COATED ORAL at 21:25

## 2019-09-11 RX ADMIN — GABAPENTIN 300 MG: 300 CAPSULE ORAL at 08:19

## 2019-09-11 RX ADMIN — DULOXETINE 30 MG: 30 CAPSULE, DELAYED RELEASE ORAL at 10:20

## 2019-09-11 RX ADMIN — ACETAMINOPHEN 650 MG: 325 TABLET ORAL at 18:42

## 2019-09-11 NOTE — NURSING NOTE
Slept well  Denies SI or AH  Rates depression 0/10 and anxiety 1/4   Med compliant  Social with roommate

## 2019-09-11 NOTE — NURSING NOTE
Patient is visible, calm and pleasant  Looking forward to discharge to a rehab facility for getting stronger and perhaps reducing hip pain  Tylenol 650 at 1845 with good effect   States her depression is lessening: "I am getting better"

## 2019-09-11 NOTE — CASE MANAGEMENT
Per request of pt I have contacted [address] / [facility] [phone] and left a VM requesting a call back to see if they would accept this pt for short term rehab without first having her optioned  I have also sent a referal to same via [employer] and await there response either way  Pt reports she has been to [place] in the past and is hopeful to leave today to start rehab  In the event she must first be optioned I have initiated the MA51 and PASRR which I will send to [facility] for LOC determination  Pt has signed the [facility] and consent for evaluation and is aware of the above listed intents

## 2019-09-11 NOTE — PROGRESS NOTES
09/11/19 1100   Activity/Group Checklist   Group Life Skills  (petals of wellness)   Attendance Attended   Attendance Duration (min) 31-45   Interactions Interacted appropriately   Affect/Mood Appropriate   Goals Achieved Able to engage in interactions; Discussed coping strategies

## 2019-09-11 NOTE — NURSING NOTE
Currently observed in bed with eyes closed and respirations noted  Appears to be resting calmly  Not voicing any complaints  Monitored on safety checks

## 2019-09-11 NOTE — NURSING NOTE
Awake, alert, oriented  Pleasant and cooperative on approach  Denies depression  Rates anxiety 1/4  Denies any SI/HI  Denies any AH/VH  Medicated with PRN Tylenol as requested and ordered for 7/10 lower back, left hip, and left leg pain  Currently resting calmly in day room interacting appropriately with staff and peers

## 2019-09-11 NOTE — PROGRESS NOTES
09/11/19 1430   Activity/Group Checklist   Group Other (Comment)  (positive coping)   Attendance Attended   Attendance Duration (min) 46-60   Interactions Interacted appropriately   Affect/Mood Appropriate   Goals Achieved Identified feelings; Discussed coping strategies; Able to listen to others; Able to engage in interactions; Able to reflect/comment on own behavior;Able to manage/cope with feelings;Verbalized increased hopefulness; Able to self-disclose; Able to recieve feedback; Able to give feedback to another;Able to experience relief/decrease in symptoms

## 2019-09-11 NOTE — PLAN OF CARE
Problem: SAFETY ADULT  Goal: Patient will remain free of falls  Description  INTERVENTIONS:  - Assess patient frequently for physical needs  -  Identify cognitive and physical deficits and behaviors that affect risk of falls    -  [place] fall precautions as indicated by assessment   - Educate patient/family on patient safety including physical limitations  - Instruct patient to call for assistance with activity based on assessment  - Modify environment to reduce risk of injury  - Consider OT/PT consult to assist with strengthening/mobility  Outcome: Adequate for Discharge  Goal: Maintain or return to baseline ADL function  Description  INTERVENTIONS:  -  Assess patient's ability to carry out ADLs; assess patient's baseline for ADL function and identify physical deficits which impact ability to perform ADLs (bathing, care of mouth/teeth, toileting, grooming, dressing, etc )  - Assess/evaluate cause of self-care deficits   - Assess range of motion  - Assess patient's mobility; develop plan if impaired  - Assess patient's need for assistive devices and provide as appropriate  - Encourage maximum independence but intervene and supervise when necessary  - Involve family in performance of ADLs  - Assess for home care needs following discharge   - Consider OT consult to assist with ADL evaluation and planning for discharge  - Provide patient education as appropriate  Outcome: Adequate for Discharge  Goal: Maintain or return mobility status to optimal level  Description  INTERVENTIONS:  - Assess patient's baseline mobility status (ambulation, transfers, stairs, etc )    - Identify cognitive and physical deficits and behaviors that affect mobility  - Identify mobility aids required to assist with transfers and/or ambulation (gait belt, sit-to-stand, lift, walker, cane, etc )  - [place] fall precautions as indicated by assessment  - Record patient progress and toleration of activity level on Mobility SBAR; progress patient to next Phase/Stage  - Instruct patient to call for assistance with activity based on assessment  - Consider rehabilitation consult to assist with strengthening/weightbearing, etc   Outcome: Adequate for Discharge     Problem: Depression  Goal: Treatment Goal: Demonstrate behavioral control of depressive symptoms, verbalize feelings of improved mood/affect, and adopt new coping skills prior to discharge  Outcome: Adequate for Discharge  Goal: Verbalize thoughts and feelings  Description  Interventions:  - Assess and re-assess patient's level of risk   - Engage patient in 1:1 interactions, daily, for a minimum of 15 minutes   - Encourage patient to express feelings, fears, frustrations, hopes   Outcome: Adequate for Discharge  Goal: Refrain from harming self  Description  Interventions:  - Monitor patient closely, per order   - Supervise medication ingestion, monitor effects and side effects   Outcome: Adequate for Discharge  Goal: Refrain from isolation  Description  Interventions:  - Develop a trusting relationship   - Encourage socialization   Outcome: Adequate for Discharge  Goal: Refrain from self-neglect  Outcome: Adequate for Discharge  Goal: Attend and participate in unit activities, including therapeutic, recreational, and educational groups  Description  Interventions:  - Provide therapeutic and educational activities daily, encourage attendance and participation, and document same in the medical record   Outcome: Adequate for Discharge  Goal: Complete daily ADLs, including personal hygiene independently, as able  Description  Interventions:  - Observe, teach, and assist patient with ADLS  -  Monitor and promote a balance of rest/activity, with adequate nutrition and elimination   Outcome: Adequate for Discharge     Problem: Anxiety  Goal: Anxiety is at manageable level  Description  Interventions:  - Assess and monitor patient's anxiety level     - Monitor for signs and symptoms (heart palpitations, chest pain, shortness of breath, headaches, nausea, feeling jumpy, restlessness, irritable, apprehensive)  - Collaborate with interdisciplinary team and initiate plan and interventions as ordered    - [place] patient to unit/surroundings  - Explain treatment plan  - Encourage participation in care  - Encourage verbalization of concerns/fears  - Identify coping mechanisms  - Assist in developing anxiety-reducing skills  - Administer/offer alternative therapies  - Limit or eliminate stimulants  Outcome: Adequate for Discharge     Problem: Ineffective Coping  Goal: Participates in unit activities  Description  Interventions:  - Provide therapeutic environment   - Provide required programming   - Redirect inappropriate behaviors   Outcome: Adequate for Discharge     Problem: DISCHARGE PLANNING  Goal: Discharge to home or other facility with appropriate resources  Description  INTERVENTIONS:  - Identify barriers to discharge w/patient and caregiver  - Arrange for needed discharge resources and transportation as appropriate  - Identify discharge learning needs (meds, wound care, etc )  - Arrange for interpretive services to assist at discharge as needed  - Refer to Case Management Department for coordinating discharge planning if the patient needs post-hospital services based on physician/advanced practitioner order or complex needs related to functional status, cognitive ability, or social support system  Outcome: Adequate for Discharge

## 2019-09-11 NOTE — PROGRESS NOTES
09/11/19 0900   Team Meeting   Meeting Type Daily Rounds   Team Members Present   Team Members Present Physician;Nurse;;Occupational Therapist   Physician Team Member Dr Dang vinson    Nursing Team Member [id number] Management Team Member Glenna Mullen    OT Team Member Munir Villela      Pt discussed at treatment rounds today, no medication changes made  Patient is ready for discharge  If rehab placement isn't possible would like to return home

## 2019-09-11 NOTE — PROGRESS NOTES
Psychiatry Progress Note    Subjective: Interval History     The patient is sitting out in the common room area this morning conversing with peers  She states mentally she is feeling well  She feels that her depression has improved with Cymbalta  She states she is having some anxiety but thinks it is because she is waiting to go to rehab  She states she is frustrated being here and is hoping she will be able to leave for rehab today  Patient is home alone during the day and physical therapy recommended inpatient rehab due to this  Patient continues to be medication and meal compliant  Pt met with Dr Gordo Alonso at length today  Will increase Cymbalta       Behavior over the last 24 hours: unchanged  Sleep: normal  Appetite: normal  Medication side effects: No  ROS: no complaints    Current medications:    Current Facility-Administered Medications:     acetaminophen (TYLENOL) tablet 650 mg, 650 mg, Oral, Q6H PRN, Gemini Caraballo MD    acetaminophen (TYLENOL) tablet 650 mg, 650 mg, Oral, Q4H PRN, Gemini Caraballo MD, 650 mg at 09/11/19 0820    acetaminophen (TYLENOL) tablet 975 mg, 975 mg, Oral, Q6H PRN, Gemini Caraballo MD, 975 mg at 09/09/19 2113    aluminum-magnesium hydroxide-simethicone (MYLANTA) 200-200-20 mg/5 mL oral suspension 30 mL, 30 mL, Oral, Q4H PRN, Marisol Junior PA-C    benztropine (COGENTIN) injection 1 mg, 1 mg, Intramuscular, Q1H PRN, Gemini Caraballo MD    calcium carbonate-vitamin D (OSCAL-D) 500 mg-200 units per tablet 1 tablet, 1 tablet, Oral, Daily With Breakfast, Marisol Junior PA-C, 1 tablet at 09/11/19 8431    DULoxetine (CYMBALTA) delayed release capsule 30 mg, 30 mg, Oral, Daily, Concho SHRAVAN Rees, 30 mg at 09/11/19 3631    gabapentin (NEURONTIN) capsule 300 mg, 300 mg, Oral, TID, Marisol Junior PA-C, 300 mg at 09/11/19 3427    haloperidol lactate (HALDOL) injection 5 mg, 5 mg, Intramuscular, Q6H PRN, Gemini Caraballo MD    hydrOXYzine HCL (ATARAX) tablet 50 mg, 50 mg, Oral, Q4H PRN, Ashok Cali MD    levothyroxine tablet 25 mcg, 25 mcg, Oral, Early Morning, Marisol Junior PA-C, 25 mcg at 09/11/19 0606    lidocaine (LIDODERM) 5 % patch 1 patch, 1 patch, Topical, Daily, Miguel Angel Ny, CRNP, 1 patch at 09/11/19 0820    LORazepam (ATIVAN) 2 mg/mL injection 2 mg, 2 mg, Intramuscular, Q4H PRN, Ashok Cali MD    magnesium hydroxide (MILK OF MAGNESIA) 400 mg/5 mL oral suspension 30 mL, 30 mL, Oral, Daily PRN, Darvin Khanna MD    OLANZapine (ZyPREXA) IM injection 5 mg, 5 mg, Intramuscular, Q3H PRN, Ashok Cali MD    risperiDONE (RisperDAL M-TABS) dispersible tablet 1 mg, 1 mg, Oral, Q3H PRN, Ashok Cali MD    senna (SENOKOT) tablet 17 2 mg, 2 tablet, Oral, HS, Marisol Junior PA-C, 17 2 mg at 09/10/19 2204    traZODone (DESYREL) tablet 50 mg, 50 mg, Oral, HS PRN, Ashko Cali MD    Current Problem List:    Patient Active Problem List   Diagnosis    Alcohol dependence in remission ([facility] [address] )    Allergic rhinitis    Arthropathy of multiple sites    Moderate episode of recurrent major depressive disorder (HCC)    Esophagitis    Irritable bowel syndrome    Raynaud's syndrome without gangrene    Osteopenia    Pain in both feet    Lesion of left plantar nerve    Lesion of right plantar nerve    Congenital pes planus    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    Right foot pain    Radiculopathy of lumbar region    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HCC)    Focal neurological deficit    GERD (gastroesophageal reflux disease)    Cervical myelopathy with cervical radiculopathy    History of recent intraspinal surgery    Autonomic orthostatic hypotension    History of migraine headaches    Mixed dyslipidemia    Lesion of lachelle    Hypertension    Right sided temporal headache    Fibromyalgia    ELLY (generalized anxiety disorder)    Herniated cervical disc    Melanoma ([facility] [address] )    Spinal stenosis    BMI 26 0-26 9,adult    Psychogenic weakness    History of cholecystectomy    Ambulatory dysfunction       Problem list reviewed 09/11/19     Objective:     Vital Signs:  Vitals:    09/10/19 0644 09/10/19 1531 09/10/19 2110 09/11/19 0720   BP: 101/56 125/75 126/75 123/64   BP Location: Right arm Right arm Right arm Right arm   Pulse: 68 89 78 103   Resp: 16 18 18 15   Temp: 98 7 °F (37 1 °C) 98 4 °F (36 9 °C) (!) 97 1 °F (36 2 °C) 97 8 °F (36 6 °C)   TempSrc: Temporal Temporal Temporal Temporal   SpO2: 97% 96% 100% 97%   Weight:       Height:             Appearance:  age appropriate and casually dressed   Behavior:  normal   Speech:  normal volume   Mood:  constricted   Affect:  normal   Thought Process:  normal   Thought Content:  normal   Perceptual Disturbances: None   Risk Potential: none   Sensorium:  person, place, situation and time   Cognition:  intact   Consciousness:  alert and awake    Attention: attention span and concentration were age appropriate   Intellect: average   Insight:  good   Judgment: good      Motor Activity: no abnormal movements       I/O Past 24 hours:  I/O last 3 completed shifts: In: 960 [P O :960]  Out: -   I/O this shift: In: 480 [P O :480]  Out: -         Labs:  Reviewed 09/11/19    Progress Toward Goals: depression improved    Assessment / Plan: Moderate episode of recurrent major depressive disorder (HCC)    Recommended Treatment:      Medication changes:  1) Increase Cymbalta to 60mg daily  PT recommended short term rehab  Non-pharmacological treatments  1) Continue with group therapy, milieu therapy and occupational therapy  Safety  1) Safety/communication plan established targeting dynamic risk factors above  2) Risks, benefits, and possible side effects of medications explained to patient and patient verbalizes understanding  Counseling / Coordination of Care    Total floor / unit time spent today 20 minutes   Greater than 50% of total time was spent with the patient and / or family counseling and / or coordination of care  A description of the counseling / coordination of care  Patient's Rights, confidentiality and exceptions to confidentiality, use of automated medical record, [facility] staff access to medical record, and consent to treatment reviewed      Roxana Castañeda PA-C

## 2019-09-12 RX ORDER — GABAPENTIN 300 MG/1
600 CAPSULE ORAL
Status: DISCONTINUED | OUTPATIENT
Start: 2019-09-12 | End: 2019-09-13

## 2019-09-12 RX ORDER — GABAPENTIN 300 MG/1
300 CAPSULE ORAL DAILY
Status: DISCONTINUED | OUTPATIENT
Start: 2019-09-13 | End: 2019-09-20 | Stop reason: HOSPADM

## 2019-09-12 RX ORDER — GABAPENTIN 300 MG/1
300 CAPSULE ORAL DAILY
Qty: 30 CAPSULE | Refills: 0 | Status: SHIPPED | OUTPATIENT
Start: 2019-09-13 | End: 2019-09-16

## 2019-09-12 RX ORDER — GABAPENTIN 300 MG/1
600 CAPSULE ORAL
Qty: 60 CAPSULE | Refills: 0 | Status: SHIPPED | OUTPATIENT
Start: 2019-09-12 | End: 2019-09-16

## 2019-09-12 RX ADMIN — LEVOTHYROXINE SODIUM 25 MCG: 25 TABLET ORAL at 05:22

## 2019-09-12 RX ADMIN — GABAPENTIN 600 MG: 300 CAPSULE ORAL at 21:50

## 2019-09-12 RX ADMIN — LIDOCAINE 1 PATCH: 50 PATCH TOPICAL at 08:22

## 2019-09-12 RX ADMIN — DULOXETINE HYDROCHLORIDE 60 MG: 60 CAPSULE, DELAYED RELEASE ORAL at 08:23

## 2019-09-12 RX ADMIN — GABAPENTIN 300 MG: 300 CAPSULE ORAL at 08:23

## 2019-09-12 RX ADMIN — OYSTER SHELL CALCIUM WITH VITAMIN D 1 TABLET: 500; 200 TABLET, FILM COATED ORAL at 08:23

## 2019-09-12 RX ADMIN — ACETAMINOPHEN 975 MG: 325 TABLET ORAL at 07:16

## 2019-09-12 RX ADMIN — SENNOSIDES 17.2 MG: 8.6 TABLET, FILM COATED ORAL at 21:50

## 2019-09-12 NOTE — PROGRESS NOTES
09/12/19 1100 09/12/19 1400   Activity/Group Checklist   Group Wellness  (guided imagery,breathing techniques ) Life Skills  (self encouragement cards )   Attendance Attended Attended   Attendance Duration (min) 31-45 46-60   Interactions Interacted appropriately Interacted appropriately   Affect/Mood Appropriate;Calm;Normal range Appropriate;Normal range   Goals Achieved Able to engage in interactions; Able to listen to others;Discussed coping strategies Able to engage in interactions; Increased hopefulness; Identified feelings   Pt  Able to give self encouragement to slow down and focus on herself more

## 2019-09-12 NOTE — NURSING NOTE
Awake, alert, oriented  Pleasant and cooperative on approach  Medication compliant  Denies any depression/anxiety  Denies any SI/HI  Denies any AH/VH  Visible on unit interacting appropriately with staff and peers  Will continue to monitor

## 2019-09-12 NOTE — PROGRESS NOTES
Psychiatry Progress Note    Subjective: Interval History     The patient is visible on the unit today  She states she is having a lot of left hip pain  Tylenol was given as well as her lidocaine patch  Patient is looking forward to being placed in short-term rehab  She states that she is not able to get around well  She states she is not dressed today because of her pain  Patient feels that she is doing better mentally  Her Cymbalta was increased yesterday  She states she felt that her anxiety was better after the increase  She continues to be medication and meal compliant  She continues to be pleasant and attending group activities  She states her sleep has been on and off  She states she used to take a higher dose of Neurontin at bedtime which helped with her sleep  Will change this today  She offers no other concerns  Social Work is working on placement in physical therapy rehab      Behavior over the last 24 hours: unchanged  Sleep: normal  Appetite: normal  Medication side effects: No  ROS: no complaints    Current medications:    Current Facility-Administered Medications:     acetaminophen (TYLENOL) tablet 650 mg, 650 mg, Oral, Q6H PRN, Dionisio Kent MD    acetaminophen (TYLENOL) tablet 650 mg, 650 mg, Oral, Q4H PRN, Dionisio Kent MD, 650 mg at 09/11/19 1842    acetaminophen (TYLENOL) tablet 975 mg, 975 mg, Oral, Q6H PRN, Dionisio Kent MD, 975 mg at 09/12/19 0716    aluminum-magnesium hydroxide-simethicone (MYLANTA) 200-200-20 mg/5 mL oral suspension 30 mL, 30 mL, Oral, Q4H PRN, Marisol Junior PA-C, 30 mL at 09/11/19 1103    benztropine (COGENTIN) injection 1 mg, 1 mg, Intramuscular, Q1H PRN, Dionisio Kent MD    calcium carbonate-vitamin D (OSCAL-D) 500 mg-200 units per tablet 1 tablet, 1 tablet, Oral, Daily With Breakfast, Marisol Junior PA-C, 1 tablet at 09/12/19 6500    DULoxetine (CYMBALTA) delayed release capsule 60 mg, 60 mg, Oral, Daily, Ten Buenrostro MD, 60 mg at 09/12/19 0823    [START ON 9/13/2019] gabapentin (NEURONTIN) capsule 300 mg, 300 mg, Oral, Daily, Anita Agee PA-C    gabapentin (NEURONTIN) capsule 600 mg, 600 mg, Oral, HS, Ronny Edwards PA-C    haloperidol lactate (HALDOL) injection 5 mg, 5 mg, Intramuscular, Q6H PRN, Cholo Lala MD    hydrOXYzine HCL (ATARAX) tablet 50 mg, 50 mg, Oral, Q4H PRN, Cholo Lala MD    levothyroxine tablet 25 mcg, 25 mcg, Oral, Early Morning, Marisol Junior PA-C, 25 mcg at 09/12/19 0522    lidocaine (LIDODERM) 5 % patch 1 patch, 1 patch, Topical, Daily, SHRAVAN Lopez, 1 patch at 09/12/19 8951    LORazepam (ATIVAN) 2 mg/mL injection 2 mg, 2 mg, Intramuscular, Q4H PRN, Cholo Lala MD    magnesium hydroxide (MILK OF MAGNESIA) 400 mg/5 mL oral suspension 30 mL, 30 mL, Oral, Daily PRN, Darvin Khanna MD    OLANZapine (ZyPREXA) IM injection 5 mg, 5 mg, Intramuscular, Q3H PRN, Cholo Lala MD    risperiDONE (RisperDAL M-TABS) dispersible tablet 1 mg, 1 mg, Oral, Q3H PRN, Cholo Lala MD    senna (SENOKOT) tablet 17 2 mg, 2 tablet, Oral, HS, Marisol Junior PA-C, 17 2 mg at 09/11/19 2125    traZODone (DESYREL) tablet 50 mg, 50 mg, Oral, HS PRN, Cholo Lala MD    Current Problem List:    Patient Active Problem List   Diagnosis    Alcohol dependence in remission ([facility] [address] )    Allergic rhinitis    Arthropathy of multiple sites    Moderate episode of recurrent major depressive disorder (HCC)    Esophagitis    Irritable bowel syndrome    Raynaud's syndrome without gangrene    Osteopenia    Pain in both feet    Lesion of left plantar nerve    Lesion of right plantar nerve    Congenital pes planus    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    Right foot pain    Radiculopathy of lumbar region    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[facility] [address] )    Focal neurological deficit    GERD (gastroesophageal reflux disease)    Cervical myelopathy with cervical radiculopathy    History of recent intraspinal surgery    Autonomic orthostatic hypotension    History of migraine headaches    Mixed dyslipidemia    Lesion of lachelle    Hypertension    Right sided temporal headache    Fibromyalgia    ELLY (generalized anxiety disorder)    Herniated cervical disc    Melanoma ([facility] [address] )    Spinal stenosis    BMI 26 0-26 9,adult    Psychogenic weakness    History of cholecystectomy    Ambulatory dysfunction       Problem list reviewed 09/12/19     Objective:     Vital Signs:  Vitals:    09/11/19 0720 09/11/19 1450 09/11/19 2112 09/12/19 0659   BP: 123/64 113/71 110/70 119/78   BP Location: Right arm Left arm Left arm Right arm   Pulse: 103 86 84 (!) 107   Resp: 15 17 18 20   Temp: 97 8 °F (36 6 °C) 97 8 °F (36 6 °C) (!) 96 8 °F (36 °C) 98 °F (36 7 °C)   TempSrc: Temporal Temporal Temporal Temporal   SpO2: 97% 100% 97% 97%   Weight:       Height:             Appearance:  age appropriate and casually dressed   Behavior:  normal   Speech:  normal volume   Mood:  constricted   Affect:  normal   Thought Process:  normal   Thought Content:  normal   Perceptual Disturbances: None   Risk Potential: none   Sensorium:  person, place, situation and time   Cognition:  intact   Consciousness:  alert and awake    Attention: attention span and concentration were age appropriate   Intellect: average   Insight:  good   Judgment: good      Motor Activity: no abnormal movements       I/O Past 24 hours:  I/O last 3 completed shifts: In: 1260 [P O :1260]  Out: -   I/O this shift: In: 480 [P O :480]  Out: -         Labs:  Reviewed 09/12/19    Progress Toward Goals: depression improved    Assessment / Plan: Moderate episode of recurrent major depressive disorder (HCC)    Recommended Treatment:      Medication changes:  1) Change Neurontin to 300 mg daily and 600 mg HS  Cymbalta increased last evening  Non-pharmacological treatments  1) Continue with group therapy, milieu therapy and occupational therapy      Safety  1) Safety/communication plan established targeting dynamic risk factors above  2) Risks, benefits, and possible side effects of medications explained to patient and patient verbalizes understanding  Counseling / Coordination of Care    Total floor / unit time spent today 20 minutes  Greater than 50% of total time was spent with the patient and / or family counseling and / or coordination of care  A description of the counseling / coordination of care  Patient's Rights, confidentiality and exceptions to confidentiality, use of automated medical record, [facility] staff access to medical record, and consent to treatment reviewed      Peg KALEB Lomax

## 2019-09-12 NOTE — PROGRESS NOTES
09/12/19 0900   Team Meeting   Meeting Type Daily Rounds   Team Members Present   Team Members Present Physician;Nurse;   Physician Team Member [id number] Team Member [facility]    Care Management Team Member Bessie deng      Pt discussed at treatment team today, continue to await short term rehab placement   No medication changes made

## 2019-09-12 NOTE — CASE MANAGEMENT
PASRR Level 1 & 2 have been faxed to [phone] and I have left a message with Marie of [employer] asking for further direction and to let her know this has been faxed and pt is wanting SNF short tern rehab    Await her call back

## 2019-09-12 NOTE — PLAN OF CARE
Problem: SAFETY ADULT  Goal: Patient will remain free of falls  Description  INTERVENTIONS:  - Assess patient frequently for physical needs  -  Identify cognitive and physical deficits and behaviors that affect risk of falls    -  [place] fall precautions as indicated by assessment   - Educate patient/family on patient safety including physical limitations  - Instruct patient to call for assistance with activity based on assessment  - Modify environment to reduce risk of injury  - Consider OT/PT consult to assist with strengthening/mobility  Outcome: Progressing  Goal: Maintain or return to baseline ADL function  Description  INTERVENTIONS:  -  Assess patient's ability to carry out ADLs; assess patient's baseline for ADL function and identify physical deficits which impact ability to perform ADLs (bathing, care of mouth/teeth, toileting, grooming, dressing, etc )  - Assess/evaluate cause of self-care deficits   - Assess range of motion  - Assess patient's mobility; develop plan if impaired  - Assess patient's need for assistive devices and provide as appropriate  - Encourage maximum independence but intervene and supervise when necessary  - Involve family in performance of ADLs  - Assess for home care needs following discharge   - Consider OT consult to assist with ADL evaluation and planning for discharge  - Provide patient education as appropriate  Outcome: Progressing  Goal: Maintain or return mobility status to optimal level  Description  INTERVENTIONS:  - Assess patient's baseline mobility status (ambulation, transfers, stairs, etc )    - Identify cognitive and physical deficits and behaviors that affect mobility  - Identify mobility aids required to assist with transfers and/or ambulation (gait belt, sit-to-stand, lift, walker, cane, etc )  - [place] fall precautions as indicated by assessment  - Record patient progress and toleration of activity level on Mobility SBAR; progress patient to next Phase/Stage  - Instruct patient to call for assistance with activity based on assessment  - Consider rehabilitation consult to assist with strengthening/weightbearing, etc   Outcome: Progressing     Problem: Depression  Goal: Treatment Goal: Demonstrate behavioral control of depressive symptoms, verbalize feelings of improved mood/affect, and adopt new coping skills prior to discharge  Outcome: Progressing  Goal: Verbalize thoughts and feelings  Description  Interventions:  - Assess and re-assess patient's level of risk   - Engage patient in 1:1 interactions, daily, for a minimum of 15 minutes   - Encourage patient to express feelings, fears, frustrations, hopes   Outcome: Progressing  Goal: Refrain from harming self  Description  Interventions:  - Monitor patient closely, per order   - Supervise medication ingestion, monitor effects and side effects   Outcome: Progressing  Goal: Refrain from isolation  Description  Interventions:  - Develop a trusting relationship   - Encourage socialization   Outcome: Progressing  Goal: Refrain from self-neglect  Outcome: Progressing  Goal: Attend and participate in unit activities, including therapeutic, recreational, and educational groups  Description  Interventions:  - Provide therapeutic and educational activities daily, encourage attendance and participation, and document same in the medical record   Outcome: Progressing  Goal: Complete daily ADLs, including personal hygiene independently, as able  Description  Interventions:  - Observe, teach, and assist patient with ADLS  -  Monitor and promote a balance of rest/activity, with adequate nutrition and elimination   Outcome: Progressing     Problem: Anxiety  Goal: Anxiety is at manageable level  Description  Interventions:  - Assess and monitor patient's anxiety level     - Monitor for signs and symptoms (heart palpitations, chest pain, shortness of breath, headaches, nausea, feeling jumpy, restlessness, irritable, apprehensive)  - Collaborate with interdisciplinary team and initiate plan and interventions as ordered    - [place] patient to unit/surroundings  - Explain treatment plan  - Encourage participation in care  - Encourage verbalization of concerns/fears  - Identify coping mechanisms  - Assist in developing anxiety-reducing skills  - Administer/offer alternative therapies  - Limit or eliminate stimulants  Outcome: Progressing     Problem: Ineffective Coping  Goal: Participates in unit activities  Description  Interventions:  - Provide therapeutic environment   - Provide required programming   - Redirect inappropriate behaviors   Outcome: Progressing

## 2019-09-12 NOTE — NURSING NOTE
Currently resting calmly in bed with her eyes closed and even breathing pattern noted  Monitored on safety checks  Not voicing any complaints, in no distress

## 2019-09-12 NOTE — CASE MANAGEMENT
Representative of [facility] is here doing a LOC assessment in the event that pt is unable to get into a [place] Rehab

## 2019-09-12 NOTE — CASE MANAGEMENT
Received a call from [facility] of [address] [phone]  She confirmed that I sent the PASRR 1 & 2 to the correct fax and gave me th contact number for that office to call and confirm [phone] and speak to [facility]  I havecalled and left a message requesting a call back      [facility] will also be emailing me the Out of State Referral form to complete and return

## 2019-09-13 RX ORDER — SENNOSIDES 8.6 MG
2 TABLET ORAL
Status: DISCONTINUED | OUTPATIENT
Start: 2019-09-13 | End: 2019-09-20 | Stop reason: HOSPADM

## 2019-09-13 RX ORDER — GABAPENTIN 300 MG/1
600 CAPSULE ORAL
Status: DISCONTINUED | OUTPATIENT
Start: 2019-09-13 | End: 2019-09-20 | Stop reason: HOSPADM

## 2019-09-13 RX ADMIN — GABAPENTIN 300 MG: 300 CAPSULE ORAL at 08:30

## 2019-09-13 RX ADMIN — LEVOTHYROXINE SODIUM 25 MCG: 25 TABLET ORAL at 05:59

## 2019-09-13 RX ADMIN — GABAPENTIN 600 MG: 300 CAPSULE ORAL at 21:29

## 2019-09-13 RX ADMIN — LIDOCAINE 1 PATCH: 50 PATCH TOPICAL at 08:30

## 2019-09-13 RX ADMIN — SENNOSIDES 17.2 MG: 8.6 TABLET, FILM COATED ORAL at 21:29

## 2019-09-13 RX ADMIN — OYSTER SHELL CALCIUM WITH VITAMIN D 1 TABLET: 500; 200 TABLET, FILM COATED ORAL at 08:30

## 2019-09-13 RX ADMIN — DULOXETINE HYDROCHLORIDE 60 MG: 60 CAPSULE, DELAYED RELEASE ORAL at 08:30

## 2019-09-13 RX ADMIN — ACETAMINOPHEN 650 MG: 325 TABLET ORAL at 11:35

## 2019-09-13 NOTE — PLAN OF CARE
Problem: SAFETY ADULT  Goal: Patient will remain free of falls  Description  INTERVENTIONS:  - Assess patient frequently for physical needs  -  Identify cognitive and physical deficits and behaviors that affect risk of falls    -  [place] fall precautions as indicated by assessment   - Educate patient/family on patient safety including physical limitations  - Instruct patient to call for assistance with activity based on assessment  - Modify environment to reduce risk of injury  - Consider OT/PT consult to assist with strengthening/mobility  Outcome: Progressing  Goal: Maintain or return to baseline ADL function  Description  INTERVENTIONS:  -  Assess patient's ability to carry out ADLs; assess patient's baseline for ADL function and identify physical deficits which impact ability to perform ADLs (bathing, care of mouth/teeth, toileting, grooming, dressing, etc )  - Assess/evaluate cause of self-care deficits   - Assess range of motion  - Assess patient's mobility; develop plan if impaired  - Assess patient's need for assistive devices and provide as appropriate  - Encourage maximum independence but intervene and supervise when necessary  - Involve family in performance of ADLs  - Assess for home care needs following discharge   - Consider OT consult to assist with ADL evaluation and planning for discharge  - Provide patient education as appropriate  Outcome: Progressing  Goal: Maintain or return mobility status to optimal level  Description  INTERVENTIONS:  - Assess patient's baseline mobility status (ambulation, transfers, stairs, etc )    - Identify cognitive and physical deficits and behaviors that affect mobility  - Identify mobility aids required to assist with transfers and/or ambulation (gait belt, sit-to-stand, lift, walker, cane, etc )  - [place] fall precautions as indicated by assessment  - Record patient progress and toleration of activity level on Mobility SBAR; progress patient to next Phase/Stage  - Instruct patient to call for assistance with activity based on assessment  - Consider rehabilitation consult to assist with strengthening/weightbearing, etc   Outcome: Progressing     Problem: Depression  Goal: Treatment Goal: Demonstrate behavioral control of depressive symptoms, verbalize feelings of improved mood/affect, and adopt new coping skills prior to discharge  Outcome: Progressing  Goal: Verbalize thoughts and feelings  Description  Interventions:  - Assess and re-assess patient's level of risk   - Engage patient in 1:1 interactions, daily, for a minimum of 15 minutes   - Encourage patient to express feelings, fears, frustrations, hopes   Outcome: Progressing  Goal: Refrain from harming self  Description  Interventions:  - Monitor patient closely, per order   - Supervise medication ingestion, monitor effects and side effects   Outcome: Progressing  Goal: Refrain from isolation  Description  Interventions:  - Develop a trusting relationship   - Encourage socialization   Outcome: Progressing  Goal: Refrain from self-neglect  Outcome: Progressing  Goal: Complete daily ADLs, including personal hygiene independently, as able  Description  Interventions:  - Observe, teach, and assist patient with ADLS  -  Monitor and promote a balance of rest/activity, with adequate nutrition and elimination   Outcome: Progressing     Problem: Anxiety  Goal: Anxiety is at manageable level  Description  Interventions:  - Assess and monitor patient's anxiety level  - Monitor for signs and symptoms (heart palpitations, chest pain, shortness of breath, headaches, nausea, feeling jumpy, restlessness, irritable, apprehensive)  - Collaborate with interdisciplinary team and initiate plan and interventions as ordered    - [place] patient to unit/surroundings  - Explain treatment plan  - Encourage participation in care  - Encourage verbalization of concerns/fears  - Identify coping mechanisms  - Assist in developing anxiety-reducing skills  - Administer/offer alternative therapies  - Limit or eliminate stimulants  Outcome: Progressing

## 2019-09-13 NOTE — NURSING NOTE
Appears to be resting in bed, eyes closed and even breathing pattern noted  Not voicing any SI's  Monitored on safety checks

## 2019-09-13 NOTE — NURSING NOTE
Awake, alert, oriented  Pleasant and cooperative on approach  Denies any depression/anxiety  Denies any SI/HI  Denies any AH/VH  Medication compliant  Visible on unit interacting appropriately with staff and peers  Will continue to monitor

## 2019-09-13 NOTE — PROGRESS NOTES
09/13/19 1400   Activity/Group Checklist   Group Other (Comment)  (positive coping/feelings idenitfication )   Attendance Attended   Attendance Duration (min) 46-60   Interactions Other (Comment)  (frustrated, irritable)   Affect/Mood Other (Comment)  (anxious)   Goals Achieved Identified feelings; Discussed coping strategies; Able to listen to others; Able to engage in interactions; Able to manage/cope with feelings; Able to self-disclose; Able to recieve feedback

## 2019-09-13 NOTE — NURSING NOTE
Pt visible in milieu  Socializing appropriately among staff and peers  No new concerns at this time  Pt pleasant, calm and social   Pt compliant with meal and medication regime  Pt watching tv in dining room currently with peers  Pt denies all s/s including SI/HI   VSS

## 2019-09-13 NOTE — PROGRESS NOTES
09/13/19 0900   Team Meeting   Meeting Type Daily Rounds   Team Members Present   Team Members Present Physician;Nurse;   Physician Team Member Dr Jayson Segundo Team Member [id number] Management Team Member Torsten Avila      Pt discussed at treatment team today, no medication changes made, continue to await rehab bed

## 2019-09-13 NOTE — PROGRESS NOTES
09/13/19 1000   Activity/Group Checklist   Group Other (Comment)  (Art Therapy Process Group/Open Choice, Discussion)   Attendance Attended   Attendance Duration (min) Greater than 60   Interactions Interacted appropriately   Affect/Mood Appropriate   Goals Achieved Identified feelings; Discussed coping strategies; Able to listen to others; Able to engage in interactions; Able to reflect/comment on own behavior;Able to manage/cope with feelings; Able to recieve feedback; Able to give feedback to another  (Able to engage art materials; full participation)

## 2019-09-13 NOTE — CASE MANAGEMENT
Pt has been found to be eligible for Level 1 referral for the LOC assessment process  I have faxed the Level 1 and Out of state referral application to the respective Dept  Of HS in [place] and they are being reviewed by:  Sia Doshi, Agency Services Representative  [address] [facility] Office  [place]   [address]anie [address] [phone]    I am awaiting the decision  I I have also been in contact with Mary [place] - [phone] to keep her informed  She will assist in getting the auth from [address] [place] [address] on Monday iof need be

## 2019-09-13 NOTE — PROGRESS NOTES
Psychiatry Progress Note    Subjective: Interval History     Patient was seen and examined this morning resting comfortably in the day room  Patient is asking that her HS medication times be changed to 9:00 p m  From 10:00 p m  As she likes to go to bed slightly earlier  Patient is somewhat anxious and distraught over the fact that she has to wait for placement at a rehab facility  The patient requested that she go to rehab as she does have physical needs prior to going home  Patient reports that she has been tolerating her medications well without side effect  She has no other issues or concerns to report to me at this time        Behavior over the last 24 hours: unchanged  Sleep: normal  Appetite: normal  Medication side effects: No  ROS: no complaints    Current medications:    Current Facility-Administered Medications:     acetaminophen (TYLENOL) tablet 650 mg, 650 mg, Oral, Q6H PRN, Cholo Lala MD    acetaminophen (TYLENOL) tablet 650 mg, 650 mg, Oral, Q4H PRN, Cholo Lala MD, 650 mg at 09/11/19 1842    acetaminophen (TYLENOL) tablet 975 mg, 975 mg, Oral, Q6H PRN, Cholo Lala MD, 975 mg at 09/12/19 0716    aluminum-magnesium hydroxide-simethicone (MYLANTA) 200-200-20 mg/5 mL oral suspension 30 mL, 30 mL, Oral, Q4H PRN, Marisol Junior PA-C, 30 mL at 09/11/19 1103    benztropine (COGENTIN) injection 1 mg, 1 mg, Intramuscular, Q1H PRN, Cholo Lala MD    calcium carbonate-vitamin D (OSCAL-D) 500 mg-200 units per tablet 1 tablet, 1 tablet, Oral, Daily With Breakfast, Marisol Junior PA-C, 1 tablet at 09/13/19 0830    DULoxetine (CYMBALTA) delayed release capsule 60 mg, 60 mg, Oral, Daily, Jada Ma MD, 60 mg at 09/13/19 0830    gabapentin (NEURONTIN) capsule 300 mg, 300 mg, Oral, Daily, Anita Agee PA-C, 300 mg at 09/13/19 0830    gabapentin (NEURONTIN) capsule 600 mg, 600 mg, Oral, HS, Anita Agee PA-C, 600 mg at 09/12/19 2150    haloperidol lactate (HALDOL) injection 5 mg, 5 mg, Intramuscular, Q6H PRN, Cody Valencia MD    hydrOXYzine HCL (ATARAX) tablet 50 mg, 50 mg, Oral, Q4H PRN, Cody Valencia MD    levothyroxine tablet 25 mcg, 25 mcg, Oral, Early Morning, Marisol Junior PA-C, 25 mcg at 09/13/19 0559    lidocaine (LIDODERM) 5 % patch 1 patch, 1 patch, Topical, Daily, SHRAVAN Alexandra, 1 patch at 09/13/19 0830    LORazepam (ATIVAN) 2 mg/mL injection 2 mg, 2 mg, Intramuscular, Q4H PRN, Cody Valencia MD    magnesium hydroxide (MILK OF MAGNESIA) 400 mg/5 mL oral suspension 30 mL, 30 mL, Oral, Daily PRN, Darvin Khanna MD    OLANZapine (ZyPREXA) IM injection 5 mg, 5 mg, Intramuscular, Q3H PRN, Cody Valencia MD    risperiDONE (RisperDAL M-TABS) dispersible tablet 1 mg, 1 mg, Oral, Q3H PRN, Cody Valencia MD    senna (SENOKOT) tablet 17 2 mg, 2 tablet, Oral, HS, Marisol Junior PA-C, 17 2 mg at 09/12/19 2150    traZODone (DESYREL) tablet 50 mg, 50 mg, Oral, HS PRN, Cody Valencia MD    Current Problem List:    Patient Active Problem List   Diagnosis    [facility] dependence [facility] [address]    Allergic rhinitis    Arthropathy of multiple sites    Moderate episode of recurrent major depressive disorder (HCC)    Esophagitis    Irritable bowel syndrome    Raynaud's syndrome without gangrene    Osteopenia    Pain in both feet    Lesion of left plantar nerve    Lesion of right plantar nerve    Congenital pes planus    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    Right foot pain    Radiculopathy of lumbar region    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[facility] [address] [facility]    Focal neurological deficit    GERD (gastroesophageal reflux disease)    Cervical myelopathy with cervical radiculopathy    History of recent intraspinal surgery    Autonomic orthostatic hypotension    History of migraine headaches    Mixed dyslipidemia    Lesion of lachelle    Hypertension    Right sided temporal headache    Fibromyalgia    ELLY (generalized anxiety disorder)    Herniated cervical disc    [facility]anoma ([facility] [address]    Spinal stenosis    BMI 26 0-26 9,adult    Psychogenic weakness    History of cholecystectomy    Ambulatory dysfunction       Problem list reviewed 09/13/19     Objective:     Vital Signs:  Vitals:    09/12/19 0659 09/12/19 1519 09/12/19 2030 09/13/19 0732   BP: 119/78 108/71 134/74 117/69   BP Location: Right arm Right arm Left arm Left arm   Pulse: (!) 107 96 77 96   Resp: 20 20 17 16   Temp: 98 °F (36 7 °C) 98 7 °F (37 1 °C) 97 8 °F (36 6 °C) 98 °F (36 7 °C)   TempSrc: Temporal Temporal Temporal Temporal   SpO2: 97% 98% 99% 99%   Weight:       Height:             Appearance:  age appropriate and casually dressed   Behavior:  normal   Speech:  normal volume   Mood:  constricted   Affect:  normal   Thought Process:  normal   Thought Content:  normal   Perceptual Disturbances: None   Risk Potential: none   Sensorium:  person, place, situation and time   Cognition:  intact   Consciousness:  alert and awake    Attention: attention span and concentration were age appropriate   Intellect: average   Insight:  good   Judgment: good      Motor Activity: no abnormal movements       I/O Past 24 hours:  I/O last 3 completed shifts: In: 1380 [P O :1380]  Out: -   I/O this shift:  In: 300 [P O :300]  Out: -         Labs:  Reviewed 09/13/19    Progress Toward Goals: depression improved    Assessment / Plan: Moderate episode of recurrent major depressive disorder (HCC)    Recommended Treatment:      Medication changes:  1) change HS dosing times to 9:00 p m  Non-pharmacological treatments  1) Continue with group therapy, milieu therapy and occupational therapy  Safety  1) Safety/communication plan established targeting dynamic risk factors above  2) Risks, benefits, and possible side effects of medications explained to patient and patient verbalizes understanding        Counseling / Coordination of Care    Total floor / unit time spent today 20 minutes  Greater than 50% of total time was spent with the patient and / or family counseling and / or coordination of care  A description of the counseling / coordination of care  Patient's Rights, confidentiality and exceptions to confidentiality, use of automated medical record, [facility] staff access to medical record, and consent to treatment reviewed      Christine Montanez PA-C

## 2019-09-14 RX ORDER — LANOLIN ALCOHOL/MO/W.PET/CERES
3 CREAM (GRAM) TOPICAL
Status: DISCONTINUED | OUTPATIENT
Start: 2019-09-14 | End: 2019-09-20 | Stop reason: HOSPADM

## 2019-09-14 RX ADMIN — ACETAMINOPHEN 975 MG: 325 TABLET ORAL at 12:36

## 2019-09-14 RX ADMIN — DULOXETINE HYDROCHLORIDE 60 MG: 60 CAPSULE, DELAYED RELEASE ORAL at 08:11

## 2019-09-14 RX ADMIN — GABAPENTIN 300 MG: 300 CAPSULE ORAL at 08:11

## 2019-09-14 RX ADMIN — LIDOCAINE 1 PATCH: 50 PATCH TOPICAL at 08:13

## 2019-09-14 RX ADMIN — MELATONIN TAB 3 MG 3 MG: 3 TAB at 21:28

## 2019-09-14 RX ADMIN — TRAZODONE HYDROCHLORIDE 50 MG: 50 TABLET ORAL at 21:30

## 2019-09-14 RX ADMIN — LEVOTHYROXINE SODIUM 25 MCG: 25 TABLET ORAL at 05:45

## 2019-09-14 RX ADMIN — OYSTER SHELL CALCIUM WITH VITAMIN D 1 TABLET: 500; 200 TABLET, FILM COATED ORAL at 08:10

## 2019-09-14 RX ADMIN — ACETAMINOPHEN 650 MG: 325 TABLET ORAL at 06:36

## 2019-09-14 RX ADMIN — SENNOSIDES 17.2 MG: 8.6 TABLET, FILM COATED ORAL at 21:28

## 2019-09-14 RX ADMIN — GABAPENTIN 600 MG: 300 CAPSULE ORAL at 21:28

## 2019-09-14 NOTE — NURSING NOTE
Patient is in her bed  Laying quietly  No signs of distress or discomfort noted  Will continue to monitor on q 7 minute checks

## 2019-09-14 NOTE — PLAN OF CARE
Problem: SAFETY ADULT  Goal: Patient will remain free of falls  Description  INTERVENTIONS:  - Assess patient frequently for physical needs  -  Identify cognitive and physical deficits and behaviors that affect risk of falls    -  [place] fall precautions as indicated by assessment   - Educate patient/family on patient safety including physical limitations  - Instruct patient to call for assistance with activity based on assessment  - Modify environment to reduce risk of injury  - Consider OT/PT consult to assist with strengthening/mobility  Outcome: Progressing  Goal: Maintain or return to baseline ADL function  Description  INTERVENTIONS:  -  Assess patient's ability to carry out ADLs; assess patient's baseline for ADL function and identify physical deficits which impact ability to perform ADLs (bathing, care of mouth/teeth, toileting, grooming, dressing, etc )  - Assess/evaluate cause of self-care deficits   - Assess range of motion  - Assess patient's mobility; develop plan if impaired  - Assess patient's need for assistive devices and provide as appropriate  - Encourage maximum independence but intervene and supervise when necessary  - Involve family in performance of ADLs  - Assess for home care needs following discharge   - Consider OT consult to assist with ADL evaluation and planning for discharge  - Provide patient education as appropriate  Outcome: Progressing  Goal: Maintain or return mobility status to optimal level  Description  INTERVENTIONS:  - Assess patient's baseline mobility status (ambulation, transfers, stairs, etc )    - Identify cognitive and physical deficits and behaviors that affect mobility  - Identify mobility aids required to assist with transfers and/or ambulation (gait belt, sit-to-stand, lift, walker, cane, etc )  - [place] fall precautions as indicated by assessment  - Record patient progress and toleration of activity level on Mobility SBAR; progress patient to next Phase/Stage  - Instruct patient to call for assistance with activity based on assessment  - Consider rehabilitation consult to assist with strengthening/weightbearing, etc   Outcome: Progressing     Problem: Depression  Goal: Treatment Goal: Demonstrate behavioral control of depressive symptoms, verbalize feelings of improved mood/affect, and adopt new coping skills prior to discharge  Outcome: Progressing  Goal: Verbalize thoughts and feelings  Description  Interventions:  - Assess and re-assess patient's level of risk   - Engage patient in 1:1 interactions, daily, for a minimum of 15 minutes   - Encourage patient to express feelings, fears, frustrations, hopes   Outcome: Progressing  Goal: Refrain from harming self  Description  Interventions:  - Monitor patient closely, per order   - Supervise medication ingestion, monitor effects and side effects   Outcome: Progressing  Goal: Refrain from isolation  Description  Interventions:  - Develop a trusting relationship   - Encourage socialization   Outcome: Progressing  Goal: Refrain from self-neglect  Outcome: Progressing  Goal: Attend and participate in unit activities, including therapeutic, recreational, and educational groups  Description  Interventions:  - Provide therapeutic and educational activities daily, encourage attendance and participation, and document same in the medical record   Outcome: Progressing  Goal: Complete daily ADLs, including personal hygiene independently, as able  Description  Interventions:  - Observe, teach, and assist patient with ADLS  -  Monitor and promote a balance of rest/activity, with adequate nutrition and elimination   Outcome: Progressing     Problem: Anxiety  Goal: Anxiety is at manageable level  Description  Interventions:  - Assess and monitor patient's anxiety level     - Monitor for signs and symptoms (heart palpitations, chest pain, shortness of breath, headaches, nausea, feeling jumpy, restlessness, irritable, apprehensive)  - Collaborate with interdisciplinary team and initiate plan and interventions as ordered    - [place] patient to unit/surroundings  - Explain treatment plan  - Encourage participation in care  - Encourage verbalization of concerns/fears  - Identify coping mechanisms  - Assist in developing anxiety-reducing skills  - Administer/offer alternative therapies  - Limit or eliminate stimulants  Outcome: Progressing     Problem: Ineffective Coping  Goal: Participates in unit activities  Description  Interventions:  - Provide therapeutic environment   - Provide required programming   - Redirect inappropriate behaviors   Outcome: Progressing

## 2019-09-14 NOTE — NURSING NOTE
Patient is visible in the milieu for most of the shift  Social with peers and staff  Pleasant and cooperative  Compliant with medication and treatment  Denies all symptoms

## 2019-09-14 NOTE — NURSING NOTE
Patient is dismayed at not having been discharged and having to "stay thru the weekend into next week" 72 hour notice signed at 7pm (1900) She said, though that she does feel better after being here and it has helped her (emotionally) She is ready to go to PT/Rehab to get stronger

## 2019-09-14 NOTE — PROGRESS NOTES
Psychiatry Progress Note    Subjective: Interval History     Anxious during assessment  Patient reports that she is unhappy that she was not discharged to physical rehabilitation services yesterday  As result his signed a 72 hour notice last evening; stating that she is not transferred to rehab on Monday that she wants to be discharged and will follow up with rehabilitation options on her own  Patient reports that she feels that her physical health this to compensating still being in the hospital   Reports increased bilateral hip pain  Patient reports that her fibromyalgia pain has also been worse and as result has been having difficulty sleeping  Patient reports that she was tearful yesterday due to feeling anxious and overwhelmed due to her ongoing admission and worsening pain  Denying any suicidal ideations  Patient with no homicidal ideations or psychosis      Behavior over the last 24 hours:  unchanged  Sleep: insomnia  Appetite: normal  Medication side effects: No  ROS: no complaints    Current medications:    Current Facility-Administered Medications:     acetaminophen (TYLENOL) tablet 650 mg, 650 mg, Oral, Q6H PRN, David Stoll MD    acetaminophen (TYLENOL) tablet 650 mg, 650 mg, Oral, Q4H PRN, David Stoll MD, 650 mg at 09/14/19 0636    acetaminophen (TYLENOL) tablet 975 mg, 975 mg, Oral, Q6H PRN, David Stoll MD, 975 mg at 09/12/19 0716    aluminum-magnesium hydroxide-simethicone (MYLANTA) 200-200-20 mg/5 mL oral suspension 30 mL, 30 mL, Oral, Q4H PRN, Marisol Junior PA-C, 30 mL at 09/11/19 1103    benztropine (COGENTIN) injection 1 mg, 1 mg, Intramuscular, Q1H PRN, David Stoll MD    calcium carbonate-vitamin D (OSCAL-D) 500 mg-200 units per tablet 1 tablet, 1 tablet, Oral, Daily With Breakfast, Marisol Junior PA-C, 1 tablet at 09/14/19 0810    DULoxetine (CYMBALTA) delayed release capsule 60 mg, 60 mg, Oral, Daily, Samir Toussaint MD, 60 mg at 09/14/19 1490   gabapentin (NEURONTIN) capsule 300 mg, 300 mg, Oral, Daily, Jennifer Dunne PA-C, 300 mg at 09/14/19 6887    gabapentin (NEURONTIN) capsule 600 mg, 600 mg, Oral, HS, Pramod Hanson PA-C, 600 mg at 09/13/19 2129    haloperidol lactate (HALDOL) injection 5 mg, 5 mg, Intramuscular, Q6H PRN, Clarissa Sharma MD    hydrOXYzine HCL (ATARAX) tablet 50 mg, 50 mg, Oral, Q4H PRN, Clarissa Sharma MD    levothyroxine tablet 25 mcg, 25 mcg, Oral, Early Morning, Marisol Junior PA-C, 25 mcg at 09/14/19 0545    lidocaine (LIDODERM) 5 % patch 1 patch, 1 patch, Topical, Daily, Sherice Dean, SHRAVAN, 1 patch at 09/14/19 0813    LORazepam (ATIVAN) 2 mg/mL injection 2 mg, 2 mg, Intramuscular, Q4H PRN, Clarissa Sharma MD    magnesium hydroxide (MILK OF MAGNESIA) 400 mg/5 mL oral suspension 30 mL, 30 mL, Oral, Daily PRN, Darvin Khanna MD    OLANZapine (ZyPREXA) IM injection 5 mg, 5 mg, Intramuscular, Q3H PRN, Clarissa Sharma MD    risperiDONE (RisperDAL M-TABS) dispersible tablet 1 mg, 1 mg, Oral, Q3H PRN, Clarissa Sharma MD    senna (SENOKOT) tablet 17 2 mg, 2 tablet, Oral, HS, Pramod Hanson PA-C, 17 2 mg at 09/13/19 2129    traZODone (DESYREL) tablet 50 mg, 50 mg, Oral, HS PRN, Clarissa Sharma MD    Current Problem List:    Patient Active Problem List   Diagnosis    [facility] dependence [facility] ([facility] [address] [facility]    Allergic rhinitis    Arthropathy of multiple sites    Moderate episode of recurrent major depressive disorder ([facility] [address]    Esophagitis    Irritable bowel syndrome    Raynaud's syndrome without gangrene    Osteopenia    Pain in both feet    Lesion of left plantar nerve    Lesion of right plantar nerve    Congenital pes planus    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    Right foot pain    Radiculopathy of lumbar region    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[facility] [address] [facility]    Focal neurological deficit    GERD (gastroesophageal reflux disease)    Cervical myelopathy with cervical radiculopathy    History of recent intraspinal surgery    Autonomic orthostatic hypotension    History of migraine headaches    Mixed dyslipidemia    Lesion of lachelle    Hypertension    Right sided temporal headache    Fibromyalgia    ELLY (generalized anxiety disorder)    Herniated cervical disc    [facility] ([facility] [address]    Spinal stenosis    BMI 26 0-26 9,adult    Psychogenic weakness    History of cholecystectomy    Ambulatory dysfunction       Problem list reviewed 09/14/19     Objective:     Vital Signs:  Vitals:    09/13/19 0732 09/13/19 1550 09/13/19 2117 09/14/19 0717   BP: 117/69 114/72 124/73 110/76   BP Location: Left arm Left arm Right arm Right arm   Pulse: 96 85 77 85   Resp: 16 17 17 16   Temp: 98 °F (36 7 °C) 97 9 °F (36 6 °C) 97 9 °F (36 6 °C) (!) 96 8 °F (36 °C)   TempSrc: Temporal Temporal Temporal Temporal   SpO2: 99% 97% 98% 96%   Weight:       Height:             Appearance:  age appropriate and casually dressed   Behavior:  normal   Speech:  normal volume   Mood:  anxious   Affect:  constricted   Thought Process:  normal   Thought Content:  normal   Perceptual Disturbances: None   Risk Potential: none   Sensorium:  person, place, situation and time   Cognition:  intact   Consciousness:  alert and awake    Attention: attention span and concentration were age appropriate   Intellect: average   Insight:  limited   Judgment: limited      Motor Activity: no abnormal movements       I/O Past 24 hours:  I/O last 3 completed shifts: In: 1140 [P O :1140]  Out: -   I/O this shift: In: 480 [P O :480]  Out: -         Labs:  Reviewed 09/14/19    Progress Toward Goals:  unchanged    Assessment / Plan: Moderate episode of recurrent major depressive disorder (HCC)    Recommended Treatment:      Medication changes:  1) add melatonin 3 mg at bedtime    Non-pharmacological treatments  1) Continue with group therapy, milieu therapy and occupational therapy      Safety  1) Safety/communication plan established targeting dynamic risk factors above  2) Risks, benefits, and possible side effects of medications explained to patient and patient verbalizes understanding  Counseling / Coordination of Care    Total floor / unit time spent today 20 minutes  Greater than 50% of total time was spent with the patient and / or family counseling and / or coordination of care  A description of the counseling / coordination of care  Patient's Rights, confidentiality and exceptions to confidentiality, use of automated medical record, [facility] staff access to medical record, and consent to treatment reviewed      Casa Mcghee PA-C

## 2019-09-15 PROCEDURE — 99232 SBSQ HOSP IP/OBS MODERATE 35: CPT | Performed by: PHYSICIAN ASSISTANT

## 2019-09-15 RX ORDER — MUSCLE RUB CREAM 100; 150 MG/G; MG/G
CREAM TOPICAL 4 TIMES DAILY PRN
Status: DISCONTINUED | OUTPATIENT
Start: 2019-09-15 | End: 2019-09-20 | Stop reason: HOSPADM

## 2019-09-15 RX ADMIN — DULOXETINE HYDROCHLORIDE 60 MG: 60 CAPSULE, DELAYED RELEASE ORAL at 08:34

## 2019-09-15 RX ADMIN — GABAPENTIN 600 MG: 300 CAPSULE ORAL at 21:08

## 2019-09-15 RX ADMIN — GABAPENTIN 300 MG: 300 CAPSULE ORAL at 08:32

## 2019-09-15 RX ADMIN — OYSTER SHELL CALCIUM WITH VITAMIN D 1 TABLET: 500; 200 TABLET, FILM COATED ORAL at 08:00

## 2019-09-15 RX ADMIN — ACETAMINOPHEN 975 MG: 325 TABLET ORAL at 09:15

## 2019-09-15 RX ADMIN — SENNOSIDES 17.2 MG: 8.6 TABLET, FILM COATED ORAL at 21:08

## 2019-09-15 RX ADMIN — TRAZODONE HYDROCHLORIDE 50 MG: 50 TABLET ORAL at 21:12

## 2019-09-15 RX ADMIN — LIDOCAINE 1 PATCH: 50 PATCH TOPICAL at 08:33

## 2019-09-15 RX ADMIN — LEVOTHYROXINE SODIUM 25 MCG: 25 TABLET ORAL at 06:08

## 2019-09-15 RX ADMIN — MELATONIN TAB 3 MG 3 MG: 3 TAB at 21:08

## 2019-09-15 NOTE — NURSING NOTE
Patient c/o pain in her posterior neck, directly in the center  Tylenol 975 MG was given per physician order  PAVEL Garcia) assessed patient  No new orders

## 2019-09-15 NOTE — PROGRESS NOTES
09/14/19 0900   Activity/Group Checklist   Group Other (Comment)  (Art Therapy Process Group/Open Choice, Discussion)   Attendance Attended   Attendance Duration (min) 46-60   Interactions Interacted appropriately   Affect/Mood Appropriate   Goals Achieved Able to listen to others; Able to engage in interactions; Increased hopefulness; Discussed coping strategies; Able to manage/cope with feelings; Able to recieve feedback; Able to give feedback to another  (Able to engage art materials; full participation)

## 2019-09-15 NOTE — PROGRESS NOTES
Psychiatry Progress Note    Subjective: Interval History     Patient less anxious during assessment this morning  Patient with a brighter affect  Reports the combination of melatonin and trazodone helped aid her sleep last evening and feels well rested this morning  Reports feeling less anxious  Less tearfulness during the day yesterday  Expressing that she also worked on utilizing her coping skills when she was feeling overwhelmed yesterday  Still expressing her interest in being transferred for her physical rehabilitation as she continues to worry about her physical deterioration  Patient denying any suicidal ideations, homicidal ideations  Patient with no psychosis  Medication and meal compliant  72 hour notice scheduled to  tomorrow       Behavior over the last 24 hours:  unchanged  Sleep:  Improving  Appetite: normal  Medication side effects: No  ROS: no complaints    Current medications:    Current Facility-Administered Medications:     acetaminophen (TYLENOL) tablet 650 mg, 650 mg, Oral, Q6H PRN, Shira Little MD    acetaminophen (TYLENOL) tablet 650 mg, 650 mg, Oral, Q4H PRN, Shira Little MD, 650 mg at 19 0636    acetaminophen (TYLENOL) tablet 975 mg, 975 mg, Oral, Q6H PRN, Shira Little MD, 975 mg at 09/15/19 0915    aluminum-magnesium hydroxide-simethicone (MYLANTA) 200-200-20 mg/5 mL oral suspension 30 mL, 30 mL, Oral, Q4H PRN, Marisol Junior PA-C, 30 mL at 19 1103    benztropine (COGENTIN) injection 1 mg, 1 mg, Intramuscular, Q1H PRN, Shira Little MD    calcium carbonate-vitamin D (OSCAL-D) 500 mg-200 units per tablet 1 tablet, 1 tablet, Oral, Daily With Breakfast, Marisol Junior PA-C, 1 tablet at 09/15/19 0800    DULoxetine (CYMBALTA) delayed release capsule 60 mg, 60 mg, Oral, Daily, Eloina Lora MD, 60 mg at 09/15/19 0834    gabapentin (NEURONTIN) capsule 300 mg, 300 mg, Oral, Daily, Anita Agee PA-C, 300 mg at 09/15/19 0832    gabapentin (NEURONTIN) capsule 600 mg, 600 mg, Oral, HS, Evelyn Appiah PA-C, 600 mg at 09/14/19 2128    haloperidol lactate (HALDOL) injection 5 mg, 5 mg, Intramuscular, Q6H PRN, Dionisio Kent MD    hydrOXYzine HCL (ATARAX) tablet 50 mg, 50 mg, Oral, Q4H PRN, Dionisio Kent MD    levothyroxine tablet 25 mcg, 25 mcg, Oral, Early Morning, Marisol Junior PA-C, 25 mcg at 09/15/19 0608    lidocaine (LIDODERM) 5 % patch 1 patch, 1 patch, Topical, Daily, SHRAVAN Hand, 1 patch at 09/15/19 2882    LORazepam (ATIVAN) 2 mg/mL injection 2 mg, 2 mg, Intramuscular, Q4H PRN, Dionisio Kent MD    magnesium hydroxide (MILK OF MAGNESIA) 400 mg/5 mL oral suspension 30 mL, 30 mL, Oral, Daily PRN, Dionisio Kent MD    melatonin tablet 3 mg, 3 mg, Oral, HS, Bernard Damico PA-C, 3 mg at 09/14/19 2128    OLANZapine (ZyPREXA) IM injection 5 mg, 5 mg, Intramuscular, Q3H PRN, Dionisio Kent MD    risperiDONE (RisperDAL M-TABS) dispersible tablet 1 mg, 1 mg, Oral, Q3H PRN, Dionisio Kent MD    senna (SENOKOT) tablet 17 2 mg, 2 tablet, Oral, HS, Evelyn Appiah PA-C, 17 2 mg at 09/14/19 2128    traZODone (DESYREL) tablet 50 mg, 50 mg, Oral, HS PRN, Dionisio Kent MD, 50 mg at 09/14/19 2130    Current Problem List:    Patient Active Problem List   Diagnosis    Alcohol dependence [facility] ([facility] )    Allergic rhinitis    Arthropathy of multiple sites    Moderate episode of recurrent major depressive disorder (HCC)    Esophagitis    Irritable bowel syndrome    Raynaud's syndrome without gangrene    Osteopenia    Pain in both feet    Lesion of left plantar nerve    Lesion of right plantar nerve    Congenital pes planus    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    Right foot pain    Radiculopathy of lumbar region    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[address] )    Focal neurological deficit    GERD (gastroesophageal reflux disease)    Cervical myelopathy with cervical radiculopathy    History of recent intraspinal surgery    Autonomic orthostatic hypotension    History of migraine headaches    Mixed dyslipidemia    Lesion of lachelle    Hypertension    Right sided temporal headache    Fibromyalgia    ELLY (generalized anxiety disorder)    Herniated cervical disc    [facility]    Spinal stenosis    BMI 26 0-26 9,adult    Psychogenic weakness    History of cholecystectomy    Ambulatory dysfunction       Problem list reviewed 09/15/19     Objective:     Vital Signs:  Vitals:    09/14/19 0717 09/14/19 1528 09/14/19 2100 09/15/19 0651   BP: 110/76 111/64 134/80 108/61   BP Location: Right arm Left arm Left arm Left arm   Pulse: 85 92 73 79   Resp: 16 18 18 18   Temp: (!) 96 8 °F (36 °C) 98 °F (36 7 °C) 98 °F (36 7 °C) 98 2 °F (36 8 °C)   TempSrc: Temporal Temporal Temporal Temporal   SpO2: 96% 98% 100% 98%   Weight:       Height:             Appearance:  age appropriate and casually dressed   Behavior:  normal   Speech:  normal volume   Mood:  normal   Affect:  normal   Thought Process:  normal   Thought Content:  normal   Perceptual Disturbances: None   Risk Potential: none   Sensorium:  person, place, situation and time   Cognition:  intact   Consciousness:  alert and awake    Attention: attention span and concentration were age appropriate   Intellect: average   Insight:  limited   Judgment: limited      Motor Activity: no abnormal movements       I/O Past 24 hours:  I/O last 3 completed shifts: In: 900 [P O :900]  Out: -   I/O this shift: In: 480 [P O :480]  Out: -         Labs:  Reviewed 09/15/19    Progress Toward Goals:  unchanged    Assessment / Plan: Moderate episode of recurrent major depressive disorder ([facility] )    Recommended Treatment:      Medication changes:  1) continue current treatment plan    Non-pharmacological treatments  1) Continue with group therapy, milieu therapy and occupational therapy      Safety  1) Safety/communication plan established targeting dynamic risk factors above  2) Risks, benefits, and possible side effects of medications explained to patient and patient verbalizes understanding  Counseling / Coordination of Care    Total floor / unit time spent today 20 minutes  Greater than 50% of total time was spent with the patient and / or family counseling and / or coordination of care  A description of the counseling / coordination of care  Patient's Rights, confidentiality and exceptions to confidentiality, use of automated medical record, [facility] staff access to medical record, and consent to treatment reviewed      Larry Pepper PA-C

## 2019-09-15 NOTE — NURSING NOTE
Patient is visible in the milieu and social with select peers  Pleasant and cooperative  Compliant with medications and treatment  Denies all symptoms and is anticipating discharge tomorrow

## 2019-09-15 NOTE — PLAN OF CARE
Problem: SAFETY ADULT  Goal: Patient will remain free of falls  Description  INTERVENTIONS:  - Assess patient frequently for physical needs  -  Identify cognitive and physical deficits and behaviors that affect risk of falls    -  [place] fall precautions as indicated by assessment   - Educate patient/family on patient safety including physical limitations  - Instruct patient to call for assistance with activity based on assessment  - Modify environment to reduce risk of injury  - Consider OT/PT consult to assist with strengthening/mobility  Outcome: Progressing  Goal: Maintain or return to baseline ADL function  Description  INTERVENTIONS:  -  Assess patient's ability to carry out ADLs; assess patient's baseline for ADL function and identify physical deficits which impact ability to perform ADLs (bathing, care of mouth/teeth, toileting, grooming, dressing, etc )  - Assess/evaluate cause of self-care deficits   - Assess range of motion  - Assess patient's mobility; develop plan if impaired  - Assess patient's need for assistive devices and provide as appropriate  - Encourage maximum independence but intervene and supervise when necessary  - Involve family in performance of ADLs  - Assess for home care needs following discharge   - Consider OT consult to assist with ADL evaluation and planning for discharge  - Provide patient education as appropriate  Outcome: Progressing  Goal: Maintain or return mobility status to optimal level  Description  INTERVENTIONS:  - Assess patient's baseline mobility status (ambulation, transfers, stairs, etc )    - Identify cognitive and physical deficits and behaviors that affect mobility  - Identify mobility aids required to assist with transfers and/or ambulation (gait belt, sit-to-stand, lift, walker, cane, etc )  - [place] fall precautions as indicated by assessment  - Record patient progress and toleration of activity level on Mobility SBAR; progress patient to next Phase/Stage  - Instruct patient to call for assistance with activity based on assessment  - Consider rehabilitation consult to assist with strengthening/weightbearing, etc   Outcome: Progressing     Problem: Depression  Goal: Treatment Goal: Demonstrate behavioral control of depressive symptoms, verbalize feelings of improved mood/affect, and adopt new coping skills prior to discharge  Outcome: Progressing  Goal: Verbalize thoughts and feelings  Description  Interventions:  - Assess and re-assess patient's level of risk   - Engage patient in 1:1 interactions, daily, for a minimum of 15 minutes   - Encourage patient to express feelings, fears, frustrations, hopes   Outcome: Progressing  Goal: Refrain from harming self  Description  Interventions:  - Monitor patient closely, per order   - Supervise medication ingestion, monitor effects and side effects   Outcome: Progressing  Goal: Refrain from isolation  Description  Interventions:  - Develop a trusting relationship   - Encourage socialization   Outcome: Progressing  Goal: Refrain from self-neglect  Outcome: Progressing  Goal: Attend and participate in unit activities, including therapeutic, recreational, and educational groups  Description  Interventions:  - Provide therapeutic and educational activities daily, encourage attendance and participation, and document same in the medical record   Outcome: Progressing  Goal: Complete daily ADLs, including personal hygiene independently, as able  Description  Interventions:  - Observe, teach, and assist patient with ADLS  -  Monitor and promote a balance of rest/activity, with adequate nutrition and elimination   Outcome: Progressing     Problem: Anxiety  Goal: Anxiety is at manageable level  Description  Interventions:  - Assess and monitor patient's anxiety level     - Monitor for signs and symptoms (heart palpitations, chest pain, shortness of breath, headaches, nausea, feeling jumpy, restlessness, irritable, apprehensive)  - Collaborate with interdisciplinary team and initiate plan and interventions as ordered    - [place] patient to unit/surroundings  - Explain treatment plan  - Encourage participation in care  - Encourage verbalization of concerns/fears  - Identify coping mechanisms  - Assist in developing anxiety-reducing skills  - Administer/offer alternative therapies  - Limit or eliminate stimulants  Outcome: Progressing     Problem: Ineffective Coping  Goal: Participates in unit activities  Description  Interventions:  - Provide therapeutic environment   - Provide required programming   - Redirect inappropriate behaviors   Outcome: Progressing

## 2019-09-15 NOTE — ASSESSMENT & PLAN NOTE
Noted   Consult PT  Patient complains of "neck lump"   Reports there is also pain associated with disc  Will order topical analgesia

## 2019-09-15 NOTE — PROGRESS NOTES
[address] Internal Medicine  Progress Note - Hill Ma 1955, 59 y o  female MRN: [id number]  Unit/Bed#: [facility] 757-74 Encounter: [id number]  Primary Care Provider: Franki Ahuja MD   Date and time admitted to hospital: 9/7/2019  1:55 AM    Herniated cervical disc  Assessment & Plan  Noted  Consult PT  Patient complains of "neck lump"   Reports there is also pain associated with disc  Will order topical analgesia    Arthropathy of multiple sites  Assessment & Plan  Patient with left hip and left knee osteoarthritis  Has been recommended for short-term rehab  Consult PT    * [facility]  Assessment & Plan  · Patient with depression and psychogenic weakness  · Medical workup negative  · Reviewed labs, will check TSH  · Psychiatric management per primary  · Has been recommended to go to short-term rehab  · Will consult PT here      VTE Pharmacologic Prophylaxis:   Pharmacologic: Pharmacologic VTE Prophylaxis contraindicated due to ambulatory  Mechanical VTE Prophylaxis in Place: No    Patient Centered Rounds: I have performed bedside rounds with nursing staff today  Discussions with Specialists or Other Care Team Provider: None    Education and Discussions with Family / Patient: Discussed care plan with patient    Time Spent for Care: 20 minutes  More than 50% of total time spent on counseling and coordination of care as described above  Current Length of Stay: 8 day(s)    Current Patient Status: Inpatient Psych   Certification Statement: The patient will continue to require additional inpatient hospital stay due to Continued psychiatric treatment, awaiting physical therapy placement  Discharge Plan:  Per primary team    Code Status: Level 1 - Full Code      Subjective:   Patient very anxious, reports pain in the back of her neck over the site of her cervical spine repair    Nursing reported a lump, and repeated psychosomatic behavior, reassured patient and will provide topical analgesia  Objective:     Vitals:   Temp (24hrs), Av 1 °F (36 7 °C), Min:98 °F (36 7 °C), Max:98 2 °F (36 8 °C)    Temp:  [98 °F (36 7 °C)-98 2 °F (36 8 °C)] 98 2 °F (36 8 °C)  HR:  [73-92] 79  Resp:  [18] 18  BP: (108-134)/(61-80) 108/61  SpO2:  [98 %-100 %] 98 %  Body mass index is 26 78 kg/m²  Input and Output Summary (last 24 hours): Intake/Output Summary (Last 24 hours) at 9/15/2019 1117  Last data filed at 9/15/2019 0814  Gross per 24 hour   Intake 900 ml   Output    Net 900 ml       Physical Exam:     Physical Exam   Constitutional: She appears well-developed and well-nourished  No distress  HENT:   Head: Normocephalic and atraumatic  Eyes: Conjunctivae are normal  No scleral icterus  Cardiovascular: Normal rate and regular rhythm  No murmur heard  Pulmonary/Chest: Effort normal and breath sounds normal  No respiratory distress  She has no wheezes  She has no rales  Abdominal: Soft  She exhibits no distension  There is no tenderness  Musculoskeletal: She exhibits no edema  Cervical back: She exhibits no tenderness and no bony tenderness  Neurological: She is alert  Skin: Skin is warm and dry  No erythema  Psychiatric: Her mood appears anxious  Nursing note and vitals reviewed  Additional Data:     Labs:                                  * I Have Reviewed All Lab Data Listed Above  * Additional Pertinent Lab Tests Reviewed:  All Labs Within Last 24 Hours Reviewed    Imaging:    Imaging Reports Reviewed Today Include: None  Imaging Personally Reviewed by Myself Includes:  None    Recent Cultures (last 7 days):           Last 24 Hours Medication List:     Current Facility-Administered Medications:  acetaminophen 650 mg Oral Q6H PRN Darvin Khanna MD   acetaminophen 650 mg Oral Q4H PRN Darvin Khanna MD   acetaminophen 975 mg Oral Q6H PRN Darvin Khanna MD   aluminum-magnesium hydroxide-simethicone 30 mL Oral Q4H PRN Marisol Juinor PA-C   benztropine 1 mg Intramuscular Q1H PRN Cholo Lala MD   calcium carbonate-vitamin D 1 tablet Oral Daily With Breakfast Marisol Junior PA-C   DULoxetine 60 mg Oral Daily Jada Ma MD   gabapentin 300 mg Oral Daily Ronny Edwards PA-C   gabapentin 600 mg Oral HS Yonathan Stapleton PA-C   haloperidol lactate 5 mg Intramuscular Q6H PRN Darvin Khanna MD   hydrOXYzine HCL 50 mg Oral Q4H PRN Cholo Lala MD   levothyroxine 25 mcg Oral Early Morning Marisol Junior PA-C   lidocaine 1 patch Topical Daily SHRAVAN Graff   LORazepam 2 mg Intramuscular Q4H PRN Darvin Khanna MD   magnesium hydroxide 30 mL Oral Daily PRN Cholo Lala MD   melatonin 3 mg Oral HS Garry Orona PA-C   menthol-methyl salicylate  Apply externally 4x Daily PRN Lilly LAKE PA-C   OLANZapine 5 mg Intramuscular Q3H PRN Cholo Lala MD   risperiDONE 1 mg Oral Q3H PRN Cholo Lala MD   senna 2 tablet Oral HS Yonathan Stapleton PA-C   traZODone 50 mg Oral HS PRN Cholo Lala MD        Today, Patient Was Seen By: Allison Logan PA-C    ** Please Note: Dictation voice to text software may have been used in the creation of this document   **

## 2019-09-15 NOTE — NURSING NOTE
Patient was visible on the unit  She is cooperative and social with select peers  Vitals stable  Patient can be anxious at times however was able to use walking to reduce anxiety  Compliant  With medications  patient also requested trazodone with pm pills  Patient in currently in bed  No signs of distress noted  Will continue to monitor on q 7 minute checks

## 2019-09-16 LAB
BILIRUB UR QL STRIP: NEGATIVE
CLARITY UR: CLEAR
COLOR UR: NORMAL
GLUCOSE UR STRIP-MCNC: NEGATIVE MG/DL
HGB UR QL STRIP.AUTO: NEGATIVE
KETONES UR STRIP-MCNC: NEGATIVE MG/DL
LEUKOCYTE ESTERASE UR QL STRIP: NEGATIVE
NITRITE UR QL STRIP: NEGATIVE
PH UR STRIP.AUTO: 7 [PH]
PROT UR STRIP-MCNC: NEGATIVE MG/DL
SP GR UR STRIP.AUTO: 1.01 (ref 1–1.04)
UROBILINOGEN UA: NEGATIVE MG/DL

## 2019-09-16 PROCEDURE — 97530 THERAPEUTIC ACTIVITIES: CPT

## 2019-09-16 PROCEDURE — 97116 GAIT TRAINING THERAPY: CPT

## 2019-09-16 PROCEDURE — 97110 THERAPEUTIC EXERCISES: CPT

## 2019-09-16 PROCEDURE — 81003 URINALYSIS AUTO W/O SCOPE: CPT | Performed by: FAMILY MEDICINE

## 2019-09-16 RX ORDER — GABAPENTIN 300 MG/1
600 CAPSULE ORAL
Qty: 60 CAPSULE | Refills: 0 | Status: SHIPPED | OUTPATIENT
Start: 2019-09-16 | End: 2019-10-04 | Stop reason: HOSPADM

## 2019-09-16 RX ORDER — TRAZODONE HYDROCHLORIDE 50 MG/1
50 TABLET ORAL
Qty: 30 TABLET | Refills: 0 | Status: SHIPPED | OUTPATIENT
Start: 2019-09-16 | End: 2019-10-04 | Stop reason: HOSPADM

## 2019-09-16 RX ORDER — SENNOSIDES 8.6 MG
2 TABLET ORAL
Qty: 60 EACH | Refills: 0 | Status: SHIPPED | OUTPATIENT
Start: 2019-09-16 | End: 2019-10-04 | Stop reason: HOSPADM

## 2019-09-16 RX ORDER — LANOLIN ALCOHOL/MO/W.PET/CERES
3 CREAM (GRAM) TOPICAL
Qty: 30 TABLET | Refills: 0 | Status: SHIPPED | OUTPATIENT
Start: 2019-09-16 | End: 2019-10-04 | Stop reason: HOSPADM

## 2019-09-16 RX ORDER — DULOXETIN HYDROCHLORIDE 60 MG/1
60 CAPSULE, DELAYED RELEASE ORAL DAILY
Qty: 30 CAPSULE | Refills: 0 | Status: SHIPPED | OUTPATIENT
Start: 2019-09-16 | End: 2019-09-25 | Stop reason: SDUPTHER

## 2019-09-16 RX ORDER — GABAPENTIN 300 MG/1
300 CAPSULE ORAL DAILY
Qty: 30 CAPSULE | Refills: 0 | Status: SHIPPED | OUTPATIENT
Start: 2019-09-16 | End: 2019-10-04 | Stop reason: HOSPADM

## 2019-09-16 RX ORDER — LEVOTHYROXINE SODIUM 0.03 MG/1
25 TABLET ORAL
Qty: 30 TABLET | Refills: 0 | Status: SHIPPED | OUTPATIENT
Start: 2019-09-16 | End: 2019-10-04 | Stop reason: HOSPADM

## 2019-09-16 RX ORDER — B-COMPLEX WITH VITAMIN C
1 TABLET ORAL
Qty: 30 TABLET | Refills: 0 | Status: ON HOLD | OUTPATIENT
Start: 2019-09-16 | End: 2019-10-03 | Stop reason: SDUPTHER

## 2019-09-16 RX ORDER — LIDOCAINE 50 MG/G
1 PATCH TOPICAL DAILY
Qty: 30 PATCH | Refills: 0 | Status: SHIPPED | OUTPATIENT
Start: 2019-09-16 | End: 2019-09-25 | Stop reason: ALTCHOICE

## 2019-09-16 RX ADMIN — TRAZODONE HYDROCHLORIDE 50 MG: 50 TABLET ORAL at 21:14

## 2019-09-16 RX ADMIN — MELATONIN TAB 3 MG 3 MG: 3 TAB at 21:15

## 2019-09-16 RX ADMIN — GABAPENTIN 600 MG: 300 CAPSULE ORAL at 21:15

## 2019-09-16 RX ADMIN — OYSTER SHELL CALCIUM WITH VITAMIN D 1 TABLET: 500; 200 TABLET, FILM COATED ORAL at 08:30

## 2019-09-16 RX ADMIN — GABAPENTIN 300 MG: 300 CAPSULE ORAL at 08:30

## 2019-09-16 RX ADMIN — ACETAMINOPHEN 975 MG: 325 TABLET ORAL at 12:50

## 2019-09-16 RX ADMIN — LEVOTHYROXINE SODIUM 25 MCG: 25 TABLET ORAL at 06:30

## 2019-09-16 RX ADMIN — LIDOCAINE 1 PATCH: 50 PATCH TOPICAL at 08:30

## 2019-09-16 RX ADMIN — DULOXETINE HYDROCHLORIDE 60 MG: 60 CAPSULE, DELAYED RELEASE ORAL at 08:30

## 2019-09-16 RX ADMIN — SENNOSIDES 17.2 MG: 8.6 TABLET, FILM COATED ORAL at 21:15

## 2019-09-16 NOTE — PROGRESS NOTES
09/16/19 0900   Team Meeting   Meeting Type Daily Rounds   Team Members Present   Team Members Present Physician;Nurse;;Occupational Therapist   Physician Team Member Dr Misa Briggs, [address] [facility] Team Member Zachary Felix Management Team Member Joselin Hunter    OT Team Member Roxana Bruno      Pt discussed at treatment rounds today, patient rescinded 72 hour notice, continues to await rehab placement

## 2019-09-16 NOTE — NURSING NOTE
Patient reported pain located to her breast cavity area, but refused prn pain med  Patient also refused her AM med  Will continue to monitor

## 2019-09-16 NOTE — NURSING NOTE
Pt requested and was given Trazodone for sleep at 2112  No other complains, med compliant, will continue to monitor

## 2019-09-16 NOTE — PROGRESS NOTES
Psychiatry Progress Note    Subjective: Interval History     The patient is visible on the unit  She does socialize with select female peers  She is attending group activities  Patient states that she did have some pain this morning  She is hoping that she will get into physical therapy rehab which case management has been working on  Patient rescinded her 72 hour notice today  She states she is feeling better with the increase in Cymbalta  She feels less anxious and feels that she is able to handle her stress better  She states she tried to keep herself busy over the weekend and read 2 books  Patient is pleasant and cooperative with staff  She states she did sleep well on Saturday evening however last night had more pain in her hands and tingling sensation  Patient offers no other concerns  She denies SI       Behavior over the last 24 hours: unchanged  Sleep: normal  Appetite: normal  Medication side effects: No  ROS: no complaints    Current medications:    Current Facility-Administered Medications:     acetaminophen (TYLENOL) tablet 650 mg, 650 mg, Oral, Q6H PRN, Doug Aragon MD    acetaminophen (TYLENOL) tablet 650 mg, 650 mg, Oral, Q4H PRN, Doug rAagon MD, 650 mg at 09/14/19 0636    acetaminophen (TYLENOL) tablet 975 mg, 975 mg, Oral, Q6H PRN, Doug Aragon MD, 975 mg at 09/15/19 0915    aluminum-magnesium hydroxide-simethicone (MYLANTA) 200-200-20 mg/5 mL oral suspension 30 mL, 30 mL, Oral, Q4H PRN, Marisol Junior PA-C, 30 mL at 09/11/19 1103    benztropine (COGENTIN) injection 1 mg, 1 mg, Intramuscular, Q1H PRN, Doug Aragon MD    calcium carbonate-vitamin D (OSCAL-D) 500 mg-200 units per tablet 1 tablet, 1 tablet, Oral, Daily With Breakfast, Marisol Junior PA-C, 1 tablet at 09/16/19 0830    DULoxetine (CYMBALTA) delayed release capsule 60 mg, 60 mg, Oral, Daily, Mariluz Mcclendon MD, 60 mg at 09/16/19 0830    gabapentin (NEURONTIN) capsule 300 mg, 300 mg, Oral, Daily, Janeth Moser PA-C, 300 mg at 09/16/19 0830    gabapentin (NEURONTIN) capsule 600 mg, 600 mg, Oral, HS, Dahiana Holt PA-C, 600 mg at 09/15/19 2108    haloperidol lactate (HALDOL) injection 5 mg, 5 mg, Intramuscular, Q6H PRN, Shawanda Rodriguez MD    hydrOXYzine HCL (ATARAX) tablet 50 mg, 50 mg, Oral, Q4H PRN, Shawanda Rodriguez MD    levothyroxine tablet 25 mcg, 25 mcg, Oral, Early Morning, Marisol Junior PA-C, 25 mcg at 09/16/19 0630    lidocaine (LIDODERM) 5 % patch 1 patch, 1 patch, Topical, Daily, SHRAVAN Mortensen, 1 patch at 09/16/19 0830    LORazepam (ATIVAN) 2 mg/mL injection 2 mg, 2 mg, Intramuscular, Q4H PRN, Shawanda Rodriguez MD    magnesium hydroxide (MILK OF MAGNESIA) 400 mg/5 mL oral suspension 30 mL, 30 mL, Oral, Daily PRN, Shawanda Rodriguez MD    melatonin tablet 3 mg, 3 mg, Oral, HS, Barrett Gibson PA-C, 3 mg at 09/15/19 2108    menthol-methyl salicylate (BENGAY) 31-68 % cream, , Apply externally, 4x Daily PRN, Lilly LAKE PA-C    OLANZapine (ZyPREXA) IM injection 5 mg, 5 mg, Intramuscular, Q3H PRN, Shawanda Rodriguez MD    risperiDONE (RisperDAL M-TABS) dispersible tablet 1 mg, 1 mg, Oral, Q3H PRN, Shawanda Rodriguez MD    senna (SENOKOT) tablet 17 2 mg, 2 tablet, Oral, HS, Dahiana Holt PA-C, 17 2 mg at 09/15/19 2108    traZODone (DESYREL) tablet 50 mg, 50 mg, Oral, HS PRN, Shawanda Rodriguez MD, 50 mg at 09/15/19 2112    Current Problem List:    Patient Active Problem List   Diagnosis    Alcohol dependence in remission ([facility] [address] )    Allergic rhinitis    Arthropathy of multiple sites    Moderate episode of recurrent major depressive disorder (HCC)    Esophagitis    Irritable bowel syndrome    Raynaud's syndrome without gangrene    Osteopenia    Pain in both feet    Lesion of left plantar nerve    Lesion of right plantar nerve    Congenital pes planus    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    Right foot pain    Radiculopathy of lumbar region    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[facility] [address] )    Focal neurological deficit    GERD (gastroesophageal reflux disease)    Cervical myelopathy with cervical radiculopathy    History of recent intraspinal surgery    Autonomic orthostatic hypotension    History of migraine headaches    Mixed dyslipidemia    Lesion of lachelle    Hypertension    Right sided temporal headache    Fibromyalgia    ELLY (generalized anxiety disorder)    Herniated cervical disc    Melanoma ([facility] [address] )    Spinal stenosis    BMI 26 0-26 9,adult    Psychogenic weakness    History of cholecystectomy    Ambulatory dysfunction       Problem list reviewed 09/16/19     Objective:     Vital Signs:  Vitals:    09/15/19 0651 09/15/19 1526 09/15/19 2108 09/16/19 0738   BP: 108/61 118/62 129/73 122/76   BP Location: Left arm Left arm Right arm Left arm   Pulse: 79 82 76 84   Resp: 18 16 16 18   Temp: 98 2 °F (36 8 °C) (!) 97 3 °F (36 3 °C) 97 5 °F (36 4 °C) (!) 97 1 °F (36 2 °C)   TempSrc: Temporal Temporal Temporal Temporal   SpO2: 98% 100% 99% 96%   Weight:       Height:             Appearance:  age appropriate and casually dressed   Behavior:  normal   Speech:  normal volume   Mood:  normal   Affect:  normal   Thought Process:  normal   Thought Content:  normal   Perceptual Disturbances: None   Risk Potential: none   Sensorium:  person, place, situation and time   Cognition:  intact   Consciousness:  alert and awake    Attention: attention span and concentration were age appropriate   Intellect: average   Insight:  good   Judgment: good      Motor Activity: no abnormal movements       I/O Past 24 hours:  I/O last 3 completed shifts: In: 1500 [P O :1500]  Out: -   I/O this shift: In: 480 [P O :480]  Out: -         Labs:  Reviewed 09/16/19    Progress Toward Goals: depression improved    Assessment / Plan:      Moderate episode of recurrent major depressive disorder (HCC)    Recommended Treatment:      Medication changes:  1) Continue current medication regimen  Patient awaiting on physical therapy rehab  Non-pharmacological treatments  1) Continue with group therapy, milieu therapy and occupational therapy  Safety  1) Safety/communication plan established targeting dynamic risk factors above  2) Risks, benefits, and possible side effects of medications explained to patient and patient verbalizes understanding  Counseling / Coordination of Care    Total floor / unit time spent today 20 minutes  Greater than 50% of total time was spent with the patient and / or family counseling and / or coordination of care  A description of the counseling / coordination of care  Patient's Rights, confidentiality and exceptions to confidentiality, use of automated medical record, [facility] staff access to medical record, and consent to treatment reviewed      Lori Miranda PA-C

## 2019-09-16 NOTE — NURSING NOTE
Physical therapy notified that patient needs a reevaluation prior to discharge to short term rehabilitation  Therapist will evaluate patient today or tomorrow

## 2019-09-16 NOTE — PROGRESS NOTES
09/16/19 1100   Activity/Group Checklist   Group Life Skills  (energizing vs  draining task )   Attendance Attended   Attendance Duration (min) 31-45   Interactions Interacted appropriately   Affect/Mood Appropriate;Calm;Normal range   Goals Achieved Able to engage in interactions

## 2019-09-16 NOTE — NURSING NOTE
Patient is visible in the milieu and social with peers  Pleasant and cooperative  Compliant with medications and treatment  Denies all symptoms  Does continue to complain of pain in the left hip and leg  Tylenol asked for as needed/per physician order

## 2019-09-16 NOTE — CASE MANAGEMENT
Have calls out to [employer], [address] [facility] / Zaynab Morales    To check on the status of the Out of state referral process  Pt does not feel safe to return home until she is physically stronger and more steady  Lauri Feng is agreeable to have pt once we receive the auth from the state

## 2019-09-16 NOTE — PROGRESS NOTES
Pt attended  open discussion group  Pt making slow and steady progress towards  recovery goals  Pt flat affect and able to self reflect  Pt noted that positive coping skills she is utilizing: reading books and working on self image  Pt less anxious  Continue to provided therepuetic group support  09/16/19 4929   Activity/Group Checklist   Group Other (Comment)  ( open discussion)   Attendance Attended   Attendance Duration (min) 31-45   Interactions Interacted appropriately   Affect/Mood Appropriate   Goals Achieved Identified feelings; Discussed coping strategies; Able to listen to others; Able to engage in interactions; Able to reflect/comment on own behavior;Able to manage/cope with feelings;Verbalized increased hopefulness; Able to self-disclose; Able to recieve feedback; Able to give feedback to another;Able to experience relief/decrease in symptoms

## 2019-09-16 NOTE — PHYSICAL THERAPY NOTE
Physical Therapy Treatment Note    Time in/out 6270-9484       09/16/19 6233   Pain Assessment   Pain Assessment No/denies pain   Pain Score 7   Pain Type Chronic pain   Pain Location Hip   Pain Orientation Left   Pain Descriptors Sharp  (in buttock and lateral hip)   Pain Frequency Constant/continuous   Patient's Stated Pain Goal 1   Pain 2   Pain Score 2 4   Pain Location 2 Hip   Pain Orientation 2 Right   Pain Descriptors 2 Sharp  (lateral aspect)   Cognition   Overall Cognitive Status WFL   Arousal/Participation Cooperative   Attention Attends with cues to redirect   Following Commands Follows one step commands with increased time or repetition   Subjective   Subjective "My right hip is now hurting but not as much as my left "   Bed Mobility   Rolling R   (Min Assist -> Supervision)   Rolling L   (Min Assist -> Supervision)   Supine to Sit 4  Minimal assistance   Additional items Verbal cues   Sit to Supine   (CGA)   Additional items Verbal cues; Increased time required   Transfers   Sit to Stand 5  Supervision   Additional items Increased time required;Armrests   Stand to Sit 5  Supervision   Additional items Increased time required;Armrests   Stand pivot 5  Supervision   Additional items Verbal cues   Ambulation/Elevation   Gait pattern Improper Weight shift; Forward Flexion; Short stride  (decresed knee flexion in stance on right; firmly  with )   Gait Assistance 5  Supervision   Additional items Verbal cues; Assist x 1   Assistive Device Rolling walker   Distance   (100 feet x 2 with RW, 10 feet without AD CGA/Min A)   Stair Management Assistance Not tested   Balance   Static Sitting Good   Static Standing Fair -   Dynamic Standing Fair  (with RW)   Ambulatory Fair  (with RW)   Endurance Deficit   Endurance Deficit Yes   Endurance Deficit Description   (pain in both legs and fatigue RLE)   Activity Tolerance   Activity Tolerance Patient limited by fatigue;Patient limited by pain   Nurse Made Aware   (RN clears for treatment)   Exercises   Quad Sets Supine;10 reps;Right;Left   Heelslides Supine;10 reps;Right;Left   Hip Abduction   (Hip IR/ER supine x 10 BLE )   Ankle Pumps   (ankle DF seated R/L x 10)   Neuro re-ed   (Log roll and supine<->sit for spine conservation)   Assessment   Prognosis Good   Problem List Decreased strength;Decreased endurance; Impaired balance;Decreased mobility; Decreased coordination;Pain   Assessment Pt seen for physical therapy services  She reports pain in RLE at lateral hip < Left hip region today  She continues to be functioning below her baseline mobility due to pain and generalized weakness in LE's  She is ambulating with RW longer distances from initial evaluation however noted abnormalities in stance phase on right  Knee is in extension throughout stance phase  She has reciprocal pattern and equal step length however her trunk is slightly forward flexed and she bears a significant amount of weight through her UE's  With cues she is able to stand more erect and lessen the  to the RW  She had difficulty initially with bed mobility and was instructed in log rolling and back conservation technique for sit<-> supine  She was able to perform these skills with CGA and VC  During therapeutic exercises performed patient was very slow to move initially due to pain however as repetitions increased her ability improved  [place] of ambulation without assistive device with noted apprehension, decreased step length, abduction of UE's  Recommend continued use of AD at this time until pain decreases, strength and ROM increases  Pt is fall risk without AD  Pt will benefit from skilled PT to progress therapy per POC to allow for safe discharge home with   Pt is motivated to work to achieve higher level of functioning  Goals   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

## 2019-09-16 NOTE — PLAN OF CARE
Problem: PHYSICAL THERAPY ADULT  Goal: Performs mobility at highest level of function for planned discharge setting  See evaluation for individualized goals  Description  Treatment/Interventions: Functional transfer training, LE strengthening/ROM, Elevations, Therapeutic exercise, Endurance training, Patient/family training, Equipment eval/education, Bed mobility, Gait training, Spoke to nursing, Spoke to case management  Equipment Recommended: Walker(current use of RW for mobility)       See flowsheet documentation for full assessment, interventions and recommendations  Outcome: Progressing  Note:   Prognosis: Good  Problem List: Decreased strength, Decreased endurance, Impaired balance, Decreased mobility, Decreased coordination, Pain  Assessment: Pt seen for physical therapy services  She reports pain in RLE at lateral hip < Left hip region today  She continues to be functioning below her baseline mobility due to pain and generalized weakness in LE's  She is ambulating with RW longer distances from initial evaluation however noted abnormalities in stance phase on right  Knee is in extension throughout stance phase  She has reciprocal pattern and equal step length however her trunk is slightly forward flexed and she bears a significant amount of weight through her UE's  With cues she is able to stand more erect and lessen the  to the RW  She had difficulty initially with bed mobility and was instructed in log rolling and back conservation technique for sit<-> supine  She was able to perform these skills with CGA and VC  During therapeutic exercises performed patient was very slow to move initially due to pain however as repetitions increased her ability improved  [place] of ambulation without assistive device with noted apprehension, decreased step length, abduction of UE's  Recommend continued use of AD at this time until pain decreases, strength and ROM increases  Pt is fall risk without AD  Pt will benefit from skilled PT to progress therapy per POC to allow for safe discharge home with   Pt is motivated to work to achieve higher level of functioning  Barriers to Discharge: Decreased caregiver support, Inaccessible home environment(alone during the day,1 RODOLFO,laundry in basement)     Recommendation: Short-term skilled PT     PT - OK to Discharge: Yes(if going to rehab and medically stable)    See flowsheet documentation for full assessment

## 2019-09-17 RX ADMIN — LEVOTHYROXINE SODIUM 25 MCG: 25 TABLET ORAL at 06:04

## 2019-09-17 RX ADMIN — LIDOCAINE 1 PATCH: 50 PATCH TOPICAL at 08:38

## 2019-09-17 RX ADMIN — ACETAMINOPHEN 650 MG: 325 TABLET ORAL at 11:06

## 2019-09-17 RX ADMIN — GABAPENTIN 300 MG: 300 CAPSULE ORAL at 08:38

## 2019-09-17 RX ADMIN — GABAPENTIN 600 MG: 300 CAPSULE ORAL at 21:19

## 2019-09-17 RX ADMIN — MELATONIN TAB 3 MG 3 MG: 3 TAB at 21:19

## 2019-09-17 RX ADMIN — ACETAMINOPHEN 975 MG: 325 TABLET ORAL at 16:07

## 2019-09-17 RX ADMIN — DULOXETINE HYDROCHLORIDE 60 MG: 60 CAPSULE, DELAYED RELEASE ORAL at 08:38

## 2019-09-17 RX ADMIN — OYSTER SHELL CALCIUM WITH VITAMIN D 1 TABLET: 500; 200 TABLET, FILM COATED ORAL at 08:37

## 2019-09-17 NOTE — PLAN OF CARE
Problem: SAFETY ADULT  Goal: Patient will remain free of falls  Description  INTERVENTIONS:  - Assess patient frequently for physical needs  -  Identify cognitive and physical deficits and behaviors that affect risk of falls    -  [place] fall precautions as indicated by assessment   - Educate patient/family on patient safety including physical limitations  - Instruct patient to call for assistance with activity based on assessment  - Modify environment to reduce risk of injury  - Consider OT/PT consult to assist with strengthening/mobility  Outcome: Progressing  Goal: Maintain or return to baseline ADL function  Description  INTERVENTIONS:  -  Assess patient's ability to carry out ADLs; assess patient's baseline for ADL function and identify physical deficits which impact ability to perform ADLs (bathing, care of mouth/teeth, toileting, grooming, dressing, etc )  - Assess/evaluate cause of self-care deficits   - Assess range of motion  - Assess patient's mobility; develop plan if impaired  - Assess patient's need for assistive devices and provide as appropriate  - Encourage maximum independence but intervene and supervise when necessary  - Involve family in performance of ADLs  - Assess for home care needs following discharge   - Consider OT consult to assist with ADL evaluation and planning for discharge  - Provide patient education as appropriate  Outcome: Progressing  Goal: Maintain or return mobility status to optimal level  Description  INTERVENTIONS:  - Assess patient's baseline mobility status (ambulation, transfers, stairs, etc )    - Identify cognitive and physical deficits and behaviors that affect mobility  - Identify mobility aids required to assist with transfers and/or ambulation (gait belt, sit-to-stand, lift, walker, cane, etc )  - [place] fall precautions as indicated by assessment  - Record patient progress and toleration of activity level on Mobility SBAR; progress patient to next Phase/Stage  - Instruct patient to call for assistance with activity based on assessment  - Consider rehabilitation consult to assist with strengthening/weightbearing, etc   Outcome: Progressing     Problem: Depression  Goal: Treatment Goal: Demonstrate behavioral control of depressive symptoms, verbalize feelings of improved mood/affect, and adopt new coping skills prior to discharge  Outcome: Progressing  Goal: Verbalize thoughts and feelings  Description  Interventions:  - Assess and re-assess patient's level of risk   - Engage patient in 1:1 interactions, daily, for a minimum of 15 minutes   - Encourage patient to express feelings, fears, frustrations, hopes   Outcome: Progressing  Goal: Refrain from harming self  Description  Interventions:  - Monitor patient closely, per order   - Supervise medication ingestion, monitor effects and side effects   Outcome: Progressing  Goal: Refrain from isolation  Description  Interventions:  - Develop a trusting relationship   - Encourage socialization   Outcome: Progressing  Goal: Refrain from self-neglect  Outcome: Progressing  Goal: Complete daily ADLs, including personal hygiene independently, as able  Description  Interventions:  - Observe, teach, and assist patient with ADLS  -  Monitor and promote a balance of rest/activity, with adequate nutrition and elimination   Outcome: Progressing     Problem: Anxiety  Goal: Anxiety is at manageable level  Description  Interventions:  - Assess and monitor patient's anxiety level  - Monitor for signs and symptoms (heart palpitations, chest pain, shortness of breath, headaches, nausea, feeling jumpy, restlessness, irritable, apprehensive)  - Collaborate with interdisciplinary team and initiate plan and interventions as ordered    - [place] patient to unit/surroundings  - Explain treatment plan  - Encourage participation in care  - Encourage verbalization of concerns/fears  - Identify coping mechanisms  - Assist in developing anxiety-reducing skills  - Administer/offer alternative therapies  - Limit or eliminate stimulants  Outcome: Progressing

## 2019-09-17 NOTE — CASE MANAGEMENT
Received a call from Eben at [facility] and she is still awaiting the authorization for transfer from 's insurer  She now has all of the clinical and releases she needs otherwise

## 2019-09-17 NOTE — PROGRESS NOTES
09/17/19 0900   Team Meeting   Meeting Type Daily Rounds   Team Members Present   Team Members Present Physician;Nurse;; Other (Discipline and Name)  (Pharmacist )   Physician Team Member Dr Barbosa [place] Team Member [facility] Management Team Member Louis Cuellar    Other (Discipline and Name) Nj Thacker discussed at treatment rounds today, no medication changes made   Continue to await rehab placement

## 2019-09-17 NOTE — PROGRESS NOTES
Psychiatry Progress Note    Subjective: Interval History     The patient is upset this morning due to confrontation with another peer  Patient states she was watching television when a peer changed the channel  Patient states that she started to have a small argument with the other patient  Patient is tearful and states she is frustrated that she is still here in the hospital  She understands that she needs physical therapy rehab as she is not safe at home  Patient was re-evaluated by physical therapy again yesterday which stated the same  Case management is working on placement  Patient feels that she is doing well overall on her medications  She feels that her depression and anxiety are well controlled  She does continue to be medication and meal compliant  She states her sleep is okay  She denies SI      Behavior over the last 24 hours: unchanged  Sleep: normal  Appetite: normal  Medication side effects: No  ROS: no complaints    Current medications:    Current Facility-Administered Medications:     acetaminophen (TYLENOL) tablet 650 mg, 650 mg, Oral, Q6H PRN, Deniz Burnham MD    acetaminophen (TYLENOL) tablet 650 mg, 650 mg, Oral, Q4H PRN, Deniz Burnham MD, 650 mg at 09/14/19 0636    acetaminophen (TYLENOL) tablet 975 mg, 975 mg, Oral, Q6H PRN, Deniz Burnham MD, 975 mg at 09/16/19 1250    aluminum-magnesium hydroxide-simethicone (MYLANTA) 200-200-20 mg/5 mL oral suspension 30 mL, 30 mL, Oral, Q4H PRN, Marisol Junior PA-C, 30 mL at 09/11/19 1103    benztropine (COGENTIN) injection 1 mg, 1 mg, Intramuscular, Q1H PRN, Deniz Burnham MD    calcium carbonate-vitamin D (OSCAL-D) 500 mg-200 units per tablet 1 tablet, 1 tablet, Oral, Daily With Breakfast, Marisol Junior PA-C, 1 tablet at 09/17/19 0837    DULoxetine (CYMBALTA) delayed release capsule 60 mg, 60 mg, Oral, Daily, Melania Hutchison MD, 60 mg at 09/17/19 0838    gabapentin (NEURONTIN) capsule 300 mg, 300 mg, Oral, Daily, Ja Murray KALEB Agee, 300 mg at 09/17/19 8830    gabapentin (NEURONTIN) capsule 600 mg, 600 mg, Oral, HS, Dahiana Holt PA-C, 600 mg at 09/16/19 2115    haloperidol lactate (HALDOL) injection 5 mg, 5 mg, Intramuscular, Q6H PRN, Shawanda Rodriguez MD    hydrOXYzine HCL (ATARAX) tablet 50 mg, 50 mg, Oral, Q4H PRN, Shawanda Rodriguez MD    levothyroxine tablet 25 mcg, 25 mcg, Oral, Early Morning, Marisol Junior PA-C, 25 mcg at 09/17/19 0604    lidocaine (LIDODERM) 5 % patch 1 patch, 1 patch, Topical, Daily, SHRAVAN Mortensen, 1 patch at 09/17/19 0838    LORazepam (ATIVAN) 2 mg/mL injection 2 mg, 2 mg, Intramuscular, Q4H PRN, Shawanda Rodriguez MD    magnesium hydroxide (MILK OF MAGNESIA) 400 mg/5 mL oral suspension 30 mL, 30 mL, Oral, Daily PRN, Shawanda Rodriguez MD    melatonin tablet 3 mg, 3 mg, Oral, HS, Barrett Gibson PA-C, 3 mg at 09/16/19 2115    menthol-methyl salicylate (BENGAY) 12-54 % cream, , Apply externally, 4x Daily PRN, Lilly LAKE PA-C    OLANZapine (ZyPREXA) IM injection 5 mg, 5 mg, Intramuscular, Q3H PRN, Shawanda Rodriguez MD    risperiDONE (RisperDAL M-TABS) dispersible tablet 1 mg, 1 mg, Oral, Q3H PRN, Shawanda Rodriguez MD    senna (SENOKOT) tablet 17 2 mg, 2 tablet, Oral, HS, Dahiana Holt PA-C, 17 2 mg at 09/16/19 2115    traZODone (DESYREL) tablet 50 mg, 50 mg, Oral, HS PRN, Shawanda Rodriguez MD, 50 mg at 09/16/19 2114    Current Problem List:    Patient Active Problem List   Diagnosis    [facility] dependence [facility]    Allergic rhinitis    Arthropathy of multiple sites    Moderate episode of recurrent major depressive disorder (HCC)    Esophagitis    Irritable bowel syndrome    Raynaud's syndrome without gangrene    Osteopenia    Pain in both feet    Lesion of left plantar nerve    Lesion of right plantar nerve    Congenital pes planus    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    Right foot pain    Radiculopathy of lumbar region    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    Focal neurological deficit    GERD (gastroesophageal reflux disease)    Cervical myelopathy with cervical radiculopathy    History of recent intraspinal surgery    Autonomic orthostatic hypotension    History of migraine headaches    Mixed dyslipidemia    Lesion of lachelle    Hypertension    Right sided temporal headache    Fibromyalgia    ELLY (generalized anxiety disorder)    Herniated cervical disc    [facility]    Spinal stenosis    BMI 26 0-26 9,adult    Psychogenic weakness    History of cholecystectomy    Ambulatory dysfunction       Problem list reviewed 09/17/19     Objective:     Vital Signs:  Vitals:    09/16/19 0738 09/16/19 1515 09/16/19 2122 09/17/19 0632   BP: 122/76 136/65 130/67 112/63   BP Location: Left arm Right arm Right arm Right arm   Pulse: 84 87 76 82   Resp: 18 18 18 18   Temp: (!) 97 1 °F (36 2 °C) (!) 97 2 °F (36 2 °C) (!) 96 8 °F (36 °C) 98 3 °F (36 8 °C)   TempSrc: Temporal Temporal Temporal Temporal   SpO2: 96% 97% 100% 98%   Weight:       Height:             Appearance:  age appropriate and casually dressed   Behavior:  normal   Speech:  normal volume   Mood:  normal   Affect:  normal   Thought Process:  normal   Thought Content:  normal   Perceptual Disturbances: None   Risk Potential: none   Sensorium:  person, place, situation and time   Cognition:  intact   Consciousness:  alert and awake    Attention: attention span and concentration were age appropriate   Intellect: average   Insight:  good   Judgment: good      Motor Activity: no abnormal movements       I/O Past 24 hours:  I/O last 3 completed shifts: In: 0 [P O :880]  Out: -   I/O this shift:  In: 280 [P O :280]  Out: -         Labs:  Reviewed 09/17/19    Progress Toward Goals: depression improved    Assessment / Plan:      Moderate episode of recurrent major depressive disorder (HCC)    Recommended Treatment:      Medication changes:  1) Continue current medication regimen  Patient awaiting on physical therapy rehab  Non-pharmacological treatments  1) Continue with group therapy, milieu therapy and occupational therapy  Safety  1) Safety/communication plan established targeting dynamic risk factors above  2) Risks, benefits, and possible side effects of medications explained to patient and patient verbalizes understanding  Counseling / Coordination of Care    Total floor / unit time spent today 20 minutes  Greater than 50% of total time was spent with the patient and / or family counseling and / or coordination of care  A description of the counseling / coordination of care  Patient's Rights, confidentiality and exceptions to confidentiality, use of automated medical record, [facility] staff access to medical record, and consent to treatment reviewed      Claudette Llamas, PA-C

## 2019-09-17 NOTE — NURSING NOTE
Patient upset this am due to someone changed TV channel that she was watching  states '' I cant  stand with people heren''  Reported ''  anxiety 1/4, depression 0/10 '''  Med and meal compliant  Awaiting rehab placement  Will continue to monitor

## 2019-09-17 NOTE — NURSING NOTE
Currently appears to be resting in bed with eyes closed and even respirations noted  Monitored on Q 7 minute safety checks

## 2019-09-17 NOTE — PROGRESS NOTES
Pt attended mh education group  Pt irritable edge and low tolerance of others, easily triggered  Reminded pt to focus on own mh recovery needs  Pt does attend groups and able to follow group format  Group worked on relapse prevention plans, crisis phone numbers, warmline, identifying triggers and symtoms  Continue ot glo harrison group support  09/17/19 3740   Activity/Group Checklist   Group Other (Comment)  (mh education)   Attendance Attended   Attendance Duration (min) 31-45   Interactions Interacted appropriately   Affect/Mood Other (Comment)  (irritable)   Goals Achieved Identified feelings; Discussed coping strategies; Able to listen to others; Able to engage in interactions; Able to reflect/comment on own behavior;Able to manage/cope with feelings;Verbalized increased hopefulness; Able to self-disclose; Able to recieve feedback; Able to give feedback to another;Able to experience relief/decrease in symptoms

## 2019-09-17 NOTE — CASE MANAGEMENT
We have now received the LOC determination from [facility] [employer]  Pt is found to be Nursing Facility Eligible  This letter and an updated PT eval has been downloaded into [email or web address] and sent to [address] [place] [address] [place]  CM  called Umesh Antonio - [phone] at that site to inform and get direction of how to proceed to prepare for pt to transfer

## 2019-09-18 ENCOUNTER — TELEPHONE (OUTPATIENT)
Dept: NEUROLOGY | Facility: CLINIC | Age: 64
End: 2019-09-18

## 2019-09-18 RX ADMIN — GABAPENTIN 300 MG: 300 CAPSULE ORAL at 08:13

## 2019-09-18 RX ADMIN — SENNOSIDES 17.2 MG: 8.6 TABLET, FILM COATED ORAL at 21:25

## 2019-09-18 RX ADMIN — TRAZODONE HYDROCHLORIDE 50 MG: 50 TABLET ORAL at 21:27

## 2019-09-18 RX ADMIN — GABAPENTIN 600 MG: 300 CAPSULE ORAL at 21:25

## 2019-09-18 RX ADMIN — LEVOTHYROXINE SODIUM 25 MCG: 25 TABLET ORAL at 05:09

## 2019-09-18 RX ADMIN — OYSTER SHELL CALCIUM WITH VITAMIN D 1 TABLET: 500; 200 TABLET, FILM COATED ORAL at 08:13

## 2019-09-18 RX ADMIN — ACETAMINOPHEN 975 MG: 325 TABLET ORAL at 05:09

## 2019-09-18 RX ADMIN — MELATONIN TAB 3 MG 3 MG: 3 TAB at 21:25

## 2019-09-18 RX ADMIN — DULOXETINE HYDROCHLORIDE 60 MG: 60 CAPSULE, DELAYED RELEASE ORAL at 08:14

## 2019-09-18 RX ADMIN — LIDOCAINE 1 PATCH: 50 PATCH TOPICAL at 08:12

## 2019-09-18 RX ADMIN — ACETAMINOPHEN 975 MG: 325 TABLET ORAL at 18:39

## 2019-09-18 NOTE — NURSING NOTE
Pt very pleasant with good appetite and steady gait  Med-compliant  VSS  Denied SI  Attended group  Monitored for safety and support

## 2019-09-18 NOTE — CASE MANAGEMENT
Received a call from    t  Responder: Cindi Odell    Comments: Request for SNF has been Denied by [facility]  Peer to Peer may be done by calling [phone] and use Ref # [id number]   Appeal may be done by member/family by calling [phone] and use same Ref #

## 2019-09-18 NOTE — NURSING NOTE
States she hasn't slept much  Requested and given tylenol 975 mg for 9/10 neck, back and leg pain at 0509  Returned to bed

## 2019-09-18 NOTE — PROGRESS NOTES
Psychiatry Progress Note    Subjective: Interval History     The patient is visible on the unit  She states she is feeling well mentally  She denies feeling depressed  She is anxious at times depending on the situation  She did not take trazodone last night and states she did not sleep well  Patient did take trazodone prior and slept better  Patient is medication and meal compliant  She is attending all of the group activities  Patient awaiting physical therapy rehab since she does not feel safe at home  PT recommended same  Case management working on this discharge plan       Behavior over the last 24 hours: unchanged  Sleep: normal  Appetite: normal  Medication side effects: No  ROS: no complaints    Current medications:    Current Facility-Administered Medications:     acetaminophen (TYLENOL) tablet 650 mg, 650 mg, Oral, Q6H PRN, Berenice Sanchez MD    acetaminophen (TYLENOL) tablet 650 mg, 650 mg, Oral, Q4H PRN, Berenice Sanchez MD, 650 mg at 09/17/19 1106    acetaminophen (TYLENOL) tablet 975 mg, 975 mg, Oral, Q6H PRN, Berenice Sanchez MD, 975 mg at 09/18/19 0509    aluminum-magnesium hydroxide-simethicone (MYLANTA) 200-200-20 mg/5 mL oral suspension 30 mL, 30 mL, Oral, Q4H PRN, Marisol Junior PA-C, 30 mL at 09/11/19 1103    benztropine (COGENTIN) injection 1 mg, 1 mg, Intramuscular, Q1H PRN, Berenice Sanchez MD    calcium carbonate-vitamin D (OSCAL-D) 500 mg-200 units per tablet 1 tablet, 1 tablet, Oral, Daily With Breakfast, Marisol Junior PA-C, 1 tablet at 09/18/19 0813    DULoxetine (CYMBALTA) delayed release capsule 60 mg, 60 mg, Oral, Daily, Jayy Armas MD, 60 mg at 09/18/19 0814    gabapentin (NEURONTIN) capsule 300 mg, 300 mg, Oral, Daily, Anita Agee PA-C, 300 mg at 09/18/19 0813    gabapentin (NEURONTIN) capsule 600 mg, 600 mg, Oral, HS, Bernice Salas PA-C, 600 mg at 09/17/19 2119    haloperidol lactate (HALDOL) injection 5 mg, 5 mg, Intramuscular, Q6H PRN, Darvin Betty Witt MD    hydrOXYzine HCL (ATARAX) tablet 50 mg, 50 mg, Oral, Q4H PRN, Jarrell Tatum MD    levothyroxine tablet 25 mcg, 25 mcg, Oral, Early Morning, Marisol Junior PA-C, 25 mcg at 09/18/19 0509    lidocaine (LIDODERM) 5 % patch 1 patch, 1 patch, Topical, Daily, Alejandrina Heath, SHRAVAN, 1 patch at 09/18/19 6423    LORazepam (ATIVAN) 2 mg/mL injection 2 mg, 2 mg, Intramuscular, Q4H PRN, Jarrell Tatum MD    magnesium hydroxide (MILK OF MAGNESIA) 400 mg/5 mL oral suspension 30 mL, 30 mL, Oral, Daily PRN, Jarrell Tatum MD    melatonin tablet 3 mg, 3 mg, Oral, HS, Clarissa Sprague PA-C, 3 mg at 09/17/19 2119    menthol-methyl salicylate (BENGAY) 08-45 % cream, , Apply externally, 4x Daily PRN, Lilly LAKE PA-C    OLANZapine (ZyPREXA) IM injection 5 mg, 5 mg, Intramuscular, Q3H PRN, Jarrell Tatum MD    risperiDONE (RisperDAL M-TABS) dispersible tablet 1 mg, 1 mg, Oral, Q3H PRN, Jarrell Tatum MD    senna (SENOKOT) tablet 17 2 mg, 2 tablet, Oral, HS, Barak Wu PA-C, 17 2 mg at 09/16/19 2115    traZODone (DESYREL) tablet 50 mg, 50 mg, Oral, HS PRN, Jarrell Tatum MD, 50 mg at 09/16/19 2114    Current Problem List:    Patient Active Problem List   Diagnosis    [facility]    Allergic rhinitis    Arthropathy of multiple sites    Moderate episode of recurrent major depressive disorder (HCC)    Esophagitis    Irritable bowel syndrome    Raynaud's syndrome without gangrene    Osteopenia    Pain in both feet    Lesion of left plantar nerve    Lesion of right plantar nerve    Congenital pes planus    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    Right foot pain    Radiculopathy of lumbar region    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[facility]    Focal neurological deficit    GERD (gastroesophageal reflux disease)    Cervical myelopathy with cervical radiculopathy    History of recent intraspinal surgery    Autonomic orthostatic hypotension    History of migraine headaches    Mixed dyslipidemia    Lesion of lachelle    Hypertension    Right sided temporal headache    Fibromyalgia    ELLY (generalized anxiety disorder)    Herniated cervical disc    [facility]    Spinal stenosis    BMI 26 0-26 9,adult    Psychogenic weakness    History of cholecystectomy    Ambulatory dysfunction       Problem list reviewed 09/18/19     Objective:     Vital Signs:  Vitals:    09/17/19 0632 09/17/19 1527 09/17/19 2056 09/18/19 0704   BP: 112/63 107/74 117/58 120/72   BP Location: Right arm Right arm Left arm Left arm   Pulse: 82 83 76 78   Resp: 18 18 18 18   Temp: 98 3 °F (36 8 °C) 98 °F (36 7 °C) 97 6 °F (36 4 °C) 97 8 °F (36 6 °C)   TempSrc: Temporal Temporal Temporal Temporal   SpO2: 98% 98% 96% 100%   Weight:       Height:             Appearance:  age appropriate and casually dressed   Behavior:  normal   Speech:  normal volume   Mood:  normal   Affect:  normal   Thought Process:  normal   Thought Content:  normal   Perceptual Disturbances: None   Risk Potential: none   Sensorium:  person, place, situation and time   Cognition:  intact   Consciousness:  alert and awake    Attention: attention span and concentration were age appropriate   Intellect: average   Insight:  good   Judgment: good      Motor Activity: no abnormal movements       I/O Past 24 hours:  I/O last 3 completed shifts: In: 36 [P O :820]  Out: -   I/O this shift: In: 480 [P O :480]  Out: -         Labs:  Reviewed 09/18/19    Progress Toward Goals: depression improved    Assessment / Plan: Moderate episode of recurrent major depressive disorder ([facility] )    Recommended Treatment:      Medication changes:  1) Continue current medication regimen  Patient awaiting on physical therapy rehab  Non-pharmacological treatments  1) Continue with group therapy, milieu therapy and occupational therapy  Safety  1) Safety/communication plan established targeting dynamic risk factors above      2) Risks, benefits, and possible side effects of medications explained to patient and patient verbalizes understanding  Counseling / Coordination of Care    Total floor / unit time spent today 20 minutes  Greater than 50% of total time was spent with the patient and / or family counseling and / or coordination of care  A description of the counseling / coordination of care  Patient's Rights, confidentiality and exceptions to confidentiality, use of automated medical record, [facility] staff access to medical record, and consent to treatment reviewed      Joseph Bonilla PA-C

## 2019-09-18 NOTE — TELEPHONE ENCOUNTER
----- Message from Adrian Hairston MD sent at 9/17/2019  5:12 PM EDT -----  Patient's MRI LS spine shows mild to moderate arthritic changes  Main finding is right side L4-5 foraminal stenosis that can cause pain down that leg  We can refer her to pain center for epidural injection if she would like

## 2019-09-18 NOTE — NURSING NOTE
Patient quiet; engages upon approach  States "they are looking for a PT REHAB BED" States she feels ok but c/o hip and back pain   Tylenol 975 at 1615 with stated effect

## 2019-09-18 NOTE — NURSING NOTE
Currently in bed with eyes closed and even breathing noted  Not voicing any complaints  Observed on safety checks

## 2019-09-18 NOTE — CASE MANAGEMENT
Called Eli at [facility] to check on status of the authorization they have sought  They have not yet heard back from [address] [place] [address]       Larry Freeman will call as soon as she has word

## 2019-09-18 NOTE — PROGRESS NOTES
09/18/19 0900   Team Meeting   Meeting Type Daily Rounds   Team Members Present   Team Members Present Physician;Nurse;   Physician Team Member Dr Atanacio Sicard, [id number] [facility] Team Member [id number] Management Team Member Jurgen Waggoner    OT Team Member Sivakumar Pinedo      Pt discussed at treatment plan today, no medications changes made   Continues to await placement

## 2019-09-19 RX ADMIN — LIDOCAINE 1 PATCH: 50 PATCH TOPICAL at 08:29

## 2019-09-19 RX ADMIN — LEVOTHYROXINE SODIUM 25 MCG: 25 TABLET ORAL at 05:24

## 2019-09-19 RX ADMIN — SENNOSIDES 17.2 MG: 8.6 TABLET, FILM COATED ORAL at 21:04

## 2019-09-19 RX ADMIN — DULOXETINE HYDROCHLORIDE 60 MG: 60 CAPSULE, DELAYED RELEASE ORAL at 08:29

## 2019-09-19 RX ADMIN — TRAZODONE HYDROCHLORIDE 50 MG: 50 TABLET ORAL at 21:05

## 2019-09-19 RX ADMIN — ACETAMINOPHEN 975 MG: 325 TABLET ORAL at 13:55

## 2019-09-19 RX ADMIN — GABAPENTIN 300 MG: 300 CAPSULE ORAL at 08:29

## 2019-09-19 RX ADMIN — GABAPENTIN 600 MG: 300 CAPSULE ORAL at 21:04

## 2019-09-19 RX ADMIN — OYSTER SHELL CALCIUM WITH VITAMIN D 1 TABLET: 500; 200 TABLET, FILM COATED ORAL at 08:29

## 2019-09-19 RX ADMIN — MELATONIN TAB 3 MG 3 MG: 3 TAB at 21:04

## 2019-09-19 RX ADMIN — ACETAMINOPHEN 650 MG: 325 TABLET ORAL at 09:13

## 2019-09-19 NOTE — CASE MANAGEMENT
Pt is now agreeing to discharge home and is wanting to be set up with [facility] , for Physical and Occupational therapies  I have called them and spoke to [facility] to alert them of this referral and have downloaded the H&P and Psych eval per their request  I will await their decision

## 2019-09-19 NOTE — NURSING NOTE
Patient was in bed sleeping when the shift started  Breathing pattern even and unlabored  Q 7 minutes safety checks ongoing

## 2019-09-19 NOTE — PROGRESS NOTES
09/19/19 1100 09/19/19 1430   Activity/Group Checklist   Group Other (Comment)  (greet the day on communication) Pet therapy   Attendance Attended Attended   Attendance Duration (min) 31-45  --    Interactions Interacted appropriately  --    Affect/Mood Appropriate;Calm;Normal range  --    Goals Achieved Able to engage in interactions; Discussed coping strategies; Identified feelings  --

## 2019-09-19 NOTE — NURSING NOTE
Alert,awake and visible on the unit  Pt reported poor sleep and feeling more depressed and anxious today  Pt denies any thoughts of self harm  Med compliant  Noted to be attending groups  No distress noted  Will continue to monitor closely

## 2019-09-19 NOTE — NURSING NOTE
Patient slept throughout the night without any complain and she is presently in bed sleeping  Compliant with her AM medication  Safety checks ongoing

## 2019-09-19 NOTE — CASE MANAGEMENT
Pt is continuing to request inpatient rehab for physical therapy to strengthen her LE  We are working on pursuit of same

## 2019-09-19 NOTE — PROGRESS NOTES
Pt attended  education group  Pt anxious and able to self reflect  Pt noted that another pt was a trigger  Pt was able to move to another table and listen appropriately  Group focused on tips for healthy boundaries  Continue to provide therepeuitc group support  09/19/19 4756   Activity/Group Checklist   Group Other (Comment)  ( education)   Attendance Attended   Attendance Duration (min) 31-45   Interactions Interacted appropriately   Affect/Mood Blunted/flat   Goals Achieved Identified feelings; Discussed coping strategies; Able to engage in interactions; Able to reflect/comment on own behavior;Able to manage/cope with feelings;Verbalized increased hopefulness; Able to self-disclose; Able to recieve feedback; Able to give feedback to another;Able to experience relief/decrease in symptoms

## 2019-09-19 NOTE — PLAN OF CARE
Problem: SAFETY ADULT  Goal: Patient will remain free of falls  Description  INTERVENTIONS:  - Assess patient frequently for physical needs  -  Identify cognitive and physical deficits and behaviors that affect risk of falls    -  [place] fall precautions as indicated by assessment   - Educate patient/family on patient safety including physical limitations  - Instruct patient to call for assistance with activity based on assessment  - Modify environment to reduce risk of injury  - Consider OT/PT consult to assist with strengthening/mobility  Outcome: Progressing  Goal: Maintain or return to baseline ADL function  Description  INTERVENTIONS:  -  Assess patient's ability to carry out ADLs; assess patient's baseline for ADL function and identify physical deficits which impact ability to perform ADLs (bathing, care of mouth/teeth, toileting, grooming, dressing, etc )  - Assess/evaluate cause of self-care deficits   - Assess range of motion  - Assess patient's mobility; develop plan if impaired  - Assess patient's need for assistive devices and provide as appropriate  - Encourage maximum independence but intervene and supervise when necessary  - Involve family in performance of ADLs  - Assess for home care needs following discharge   - Consider OT consult to assist with ADL evaluation and planning for discharge  - Provide patient education as appropriate  Outcome: Progressing  Goal: Maintain or return mobility status to optimal level  Description  INTERVENTIONS:  - Assess patient's baseline mobility status (ambulation, transfers, stairs, etc )    - Identify cognitive and physical deficits and behaviors that affect mobility  - Identify mobility aids required to assist with transfers and/or ambulation (gait belt, sit-to-stand, lift, walker, cane, etc )  - [place] fall precautions as indicated by assessment  - Record patient progress and toleration of activity level on Mobility SBAR; progress patient to next Phase/Stage  - Instruct patient to call for assistance with activity based on assessment  - Consider rehabilitation consult to assist with strengthening/weightbearing, etc   Outcome: Progressing     Problem: Depression  Goal: Treatment Goal: Demonstrate behavioral control of depressive symptoms, verbalize feelings of improved mood/affect, and adopt new coping skills prior to discharge  Outcome: Progressing  Goal: Verbalize thoughts and feelings  Description  Interventions:  - Assess and re-assess patient's level of risk   - Engage patient in 1:1 interactions, daily, for a minimum of 15 minutes   - Encourage patient to express feelings, fears, frustrations, hopes   Outcome: Progressing  Goal: Refrain from harming self  Description  Interventions:  - Monitor patient closely, per order   - Supervise medication ingestion, monitor effects and side effects   Outcome: Progressing  Goal: Refrain from isolation  Description  Interventions:  - Develop a trusting relationship   - Encourage socialization   Outcome: Progressing  Goal: Refrain from self-neglect  Outcome: Progressing  Goal: Complete daily ADLs, including personal hygiene independently, as able  Description  Interventions:  - Observe, teach, and assist patient with ADLS  -  Monitor and promote a balance of rest/activity, with adequate nutrition and elimination   Outcome: Progressing

## 2019-09-19 NOTE — CASE MANAGEMENT
Returned a call to Will at [facility] [phone] regarding their concerns that pt canceled their services in March of this year  Pt stated she wanted to start driving and that took her out of homebound status  She now understands the negatives of her decision  Pt is interested in services at home to help her strengthen and learn adaptive approach's  She is hopeful for [facility] to accept

## 2019-09-19 NOTE — NURSING NOTE
Initially in the beginning of the shift she was teary about lack of finding an inpatient PT facility  Later, she was talking and socializing with peers  Much more upbeat  Tylenol 975 mg for hip and back pain at 1845 at stated effect     Trazadone 50 prn for insomnia    Will monitor

## 2019-09-19 NOTE — PROGRESS NOTES
Psychiatry Progress Note    Subjective: Interval History     Patient is sitting out on the unit this morning  She states she had a rough day yesterday due to another patient  She states she hates confrontation and this other patient was upsetting her  Patient is tearful during this conversation  Patient states that she is looking forward to physical therapy  Patient states she is afraid of losing more of her movement  She has been dependent on her walker  She states she has carpeting in her home and is not able to use her walker on that  Patient continues to be medication compliant  She states she does not have much of an appetite this morning due to being upset      Behavior over the last 24 hours: unchanged  Sleep: normal  Appetite: normal  Medication side effects: No  ROS: no complaints    Current medications:    Current Facility-Administered Medications:     acetaminophen (TYLENOL) tablet 650 mg, 650 mg, Oral, Q6H PRN, Aniket Sen MD    acetaminophen (TYLENOL) tablet 650 mg, 650 mg, Oral, Q4H PRN, Aniket Sen MD, 650 mg at 09/17/19 1106    acetaminophen (TYLENOL) tablet 975 mg, 975 mg, Oral, Q6H PRN, Aniket Sen MD, 975 mg at 09/18/19 1839    aluminum-magnesium hydroxide-simethicone (MYLANTA) 200-200-20 mg/5 mL oral suspension 30 mL, 30 mL, Oral, Q4H PRN, Marisol Junior PA-C, 30 mL at 09/11/19 1103    benztropine (COGENTIN) injection 1 mg, 1 mg, Intramuscular, Q1H PRN, Aniket Sen MD    calcium carbonate-vitamin D (OSCAL-D) 500 mg-200 units per tablet 1 tablet, 1 tablet, Oral, Daily With Breakfast, Marisol Junior PA-C, 1 tablet at 09/19/19 0829    DULoxetine (CYMBALTA) delayed release capsule 60 mg, 60 mg, Oral, Daily, Lindy Steele MD, 60 mg at 09/19/19 0829    gabapentin (NEURONTIN) capsule 300 mg, 300 mg, Oral, Daily, Anita Agee PA-C, 300 mg at 09/19/19 0829    gabapentin (NEURONTIN) capsule 600 mg, 600 mg, Oral, HS, Zollie Dubin, PA-C, 600 mg at 09/18/19 2125    haloperidol lactate (HALDOL) injection 5 mg, 5 mg, Intramuscular, Q6H PRN, Cody Valencia MD    hydrOXYzine HCL (ATARAX) tablet 50 mg, 50 mg, Oral, Q4H PRN, Cody Valencia MD    levothyroxine tablet 25 mcg, 25 mcg, Oral, Early Morning, Marisol Junior PA-C, 25 mcg at 09/19/19 0524    lidocaine (LIDODERM) 5 % patch 1 patch, 1 patch, Topical, Daily, SHRAVAN Alexandra, 1 patch at 09/19/19 0829    LORazepam (ATIVAN) 2 mg/mL injection 2 mg, 2 mg, Intramuscular, Q4H PRN, Cody Valencai MD    magnesium hydroxide (MILK OF MAGNESIA) 400 mg/5 mL oral suspension 30 mL, 30 mL, Oral, Daily PRN, Cody Valencia MD    melatonin tablet 3 mg, 3 mg, Oral, HS, Brittny Peoples PA-C, 3 mg at 09/18/19 2125    menthol-methyl salicylate (BENGAY) 54-04 % cream, , Apply externally, 4x Daily PRN, Lilly LAKE PA-C    OLANZapine (ZyPREXA) IM injection 5 mg, 5 mg, Intramuscular, Q3H PRN, Cody Valencia MD    risperiDONE (RisperDAL M-TABS) dispersible tablet 1 mg, 1 mg, Oral, Q3H PRN, Cody Valencia MD    senna (SENOKOT) tablet 17 2 mg, 2 tablet, Oral, HS, Tr Torres PA-C, 17 2 mg at 09/18/19 2125    traZODone (DESYREL) tablet 50 mg, 50 mg, Oral, HS PRN, Cody Valencia MD, 50 mg at 09/18/19 2127    Current Problem List:    Patient Active Problem List   Diagnosis    Alcohol dependence in remission ([facility] [facility]    Allergic rhinitis    Arthropathy of multiple sites    Moderate episode of recurrent major depressive disorder (HCC)    Esophagitis    Irritable bowel syndrome    Raynaud's syndrome without gangrene    Osteopenia    Pain in both feet    Lesion of left plantar nerve    Lesion of right plantar nerve    Congenital pes planus    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    Right foot pain    Radiculopathy of lumbar region    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[address] )    Focal neurological deficit    GERD (gastroesophageal reflux disease)    Cervical myelopathy with cervical radiculopathy    History of recent intraspinal surgery    Autonomic orthostatic hypotension    History of migraine headaches    Mixed dyslipidemia    Lesion of lachelle    Hypertension    Right sided temporal headache    Fibromyalgia    ELLY (generalized anxiety disorder)    Herniated cervical disc    [facility]    Spinal stenosis    BMI 26 0-26 9,adult    Psychogenic weakness    History of cholecystectomy    Ambulatory dysfunction       Problem list reviewed 09/19/19     Objective:     Vital Signs:  Vitals:    09/18/19 0704 09/18/19 1546 09/18/19 2045 09/19/19 0652   BP: 120/72 147/69 121/77 98/54   BP Location: Left arm Left arm Right arm Left arm   Pulse: 78 87 88 75   Resp: 18 18 18 18   Temp: 97 8 °F (36 6 °C) 97 7 °F (36 5 °C) 98 2 °F (36 8 °C) 97 8 °F (36 6 °C)   TempSrc: Temporal Temporal Temporal Temporal   SpO2: 100% 99% 98% 95%   Weight:       Height:             Appearance:  age appropriate and casually dressed   Behavior:  normal   Speech:  normal volume   Mood:  normal   Affect:  normal   Thought Process:  normal   Thought Content:  normal   Perceptual Disturbances: None   Risk Potential: none   Sensorium:  person, place, situation and time   Cognition:  intact   Consciousness:  alert and awake    Attention: attention span and concentration were age appropriate   Intellect: average   Insight:  good   Judgment: good      Motor Activity: no abnormal movements       I/O Past 24 hours:  I/O last 3 completed shifts: In: 1140 [P O :1140]  Out: -   I/O this shift:  In: 300 [P O :300]  Out: -         Labs:  Reviewed 09/19/19    Progress Toward Goals: depression improved    Assessment / Plan: Moderate episode of recurrent major depressive disorder ([facility] )    Recommended Treatment:      Medication changes:  1) Continue current medication regimen  Patient awaiting on physical therapy rehab       Non-pharmacological treatments  1) Continue with group therapy, milieu therapy and occupational therapy  Safety  1) Safety/communication plan established targeting dynamic risk factors above  2) Risks, benefits, and possible side effects of medications explained to patient and patient verbalizes understanding  Counseling / Coordination of Care    Total floor / unit time spent today 20 minutes  Greater than 50% of total time was spent with the patient and / or family counseling and / or coordination of care  A description of the counseling / coordination of care  Patient's Rights, confidentiality and exceptions to confidentiality, use of automated medical record, [facility] staff access to medical record, and consent to treatment reviewed      Lynnetta Sacks, PA-C

## 2019-09-19 NOTE — PROGRESS NOTES
09/19/19 0900   Team Meeting   Meeting Type Daily Rounds   Team Members Present   Team Members Present Physician;Nurse;   Physician Team Member Dr Dai Gusman, [id number] [facility] Team Member [id number] Team Member Germania DENT      Pt discussed at treatment plan today, no medication changes made, will await peer to peer review results today

## 2019-09-20 VITALS
HEIGHT: 64 IN | HEART RATE: 93 BPM | DIASTOLIC BLOOD PRESSURE: 73 MMHG | OXYGEN SATURATION: 97 % | WEIGHT: 156 LBS | SYSTOLIC BLOOD PRESSURE: 118 MMHG | BODY MASS INDEX: 26.63 KG/M2 | RESPIRATION RATE: 18 BRPM | TEMPERATURE: 98.7 F

## 2019-09-20 RX ADMIN — DULOXETINE HYDROCHLORIDE 60 MG: 60 CAPSULE, DELAYED RELEASE ORAL at 08:37

## 2019-09-20 RX ADMIN — LIDOCAINE 1 PATCH: 50 PATCH TOPICAL at 08:38

## 2019-09-20 RX ADMIN — ACETAMINOPHEN 650 MG: 325 TABLET ORAL at 12:40

## 2019-09-20 RX ADMIN — GABAPENTIN 300 MG: 300 CAPSULE ORAL at 08:38

## 2019-09-20 RX ADMIN — LEVOTHYROXINE SODIUM 25 MCG: 25 TABLET ORAL at 05:17

## 2019-09-20 RX ADMIN — OYSTER SHELL CALCIUM WITH VITAMIN D 1 TABLET: 500; 200 TABLET, FILM COATED ORAL at 08:37

## 2019-09-20 NOTE — NURSING NOTE
Awake and visible on the unit  Pleasant and cooperative with staff,sosial with peers  Pt reported improvement in her depression and anxiety, denies any thoughts of self harm  Pt noted to be attending groups  Will continue to monitor closely

## 2019-09-20 NOTE — PROGRESS NOTES
09/20/19 1400   Activity/Group Checklist   Group Other (Comment)  (positive affimation)   Attendance Attended   Attendance Duration (min) 46-60   Interactions Interacted appropriately   Affect/Mood Appropriate   Goals Achieved Identified feelings; Discussed coping strategies; Able to listen to others; Able to engage in interactions; Able to reflect/comment on own behavior;Able to manage/cope with feelings;Verbalized increased hopefulness; Able to self-disclose; Able to recieve feedback; Able to give feedback to another;Able to experience relief/decrease in symptoms

## 2019-09-20 NOTE — CASE MANAGEMENT
Pt is now reporting she does not want home health care services because she fears doing additional damage to her lower back with therapy and exercise  She asked that I stop that pursuit as she wants to schedule an appointment with her neuro-surgeon first  She and her  talked this out last evening and pt is feeling she will be fine to go home since this is a weekend and she will contact a friend to spend time with her during the days next week  I also called to [facility] and they are only open till 6:00PM today and her  may not get here to pick her up till after that time so pt has now requested that I fax the scripts to [employer] ([phone])  in [place] on [facility]  I have contacted them and faxed to [phone]

## 2019-09-20 NOTE — CASE MANAGEMENT
Prior to admission pt was not involved with any psychiatrists, but had been seen while in a SNF in March 2019 by Dr Stephen Jackson  She has requested we contact him to set up aftercare  His office accepted this referral and have put her on their cancellation list since appointment is not till November  Pt and Dr Semaj Rea are both aware  Pt does not want this CM to make any PCP appointments as she is strongly considering changing to a new PCP  She also wants to schedule her own appointment with a neurosurgeon, despite my offer

## 2019-09-20 NOTE — PROGRESS NOTES
09/20/19 1100   Activity/Group Checklist   Group Exercise   Attendance Attended   Attendance Duration (min) 31-45   Interactions Interacted appropriately   Affect/Mood Appropriate;Calm;Normal range   Goals Achieved Able to engage in interactions

## 2019-09-20 NOTE — NURSING NOTE
Patient slept all night without any complain and she is presently in bed sleeping  Compliant with her AM medication  Q 7 minutes safety checks ongoing

## 2019-09-20 NOTE — PROGRESS NOTES
09/20/19 0900   Team Meeting   Meeting Type Daily Rounds   Team Members Present   Team Members Present Physician;Nurse;   Physician Team Member Dr Antonino Abreu, [address] [facility] Team Member [facility] Team Member Tonie Kumar    OT Team Member Venancio NUÑEZ      Pt discussed at treatment rounds today, no medication changes made   Planned discharge for this evening

## 2019-09-20 NOTE — NURSING NOTE
Pt visible in milieu  Pt social among select peers and staff  Pt participating with peers in doing puzzles  Pt denies SI/HI  Pt reports depression and anxiety  Pt meal and medication compliant  No new concerns at this time  VSS

## 2019-09-20 NOTE — PLAN OF CARE
Problem: SAFETY ADULT  Goal: Patient will remain free of falls  Description  INTERVENTIONS:  - Assess patient frequently for physical needs  -  Identify cognitive and physical deficits and behaviors that affect risk of falls    -  [place] fall precautions as indicated by assessment   - Educate patient/family on patient safety including physical limitations  - Instruct patient to call for assistance with activity based on assessment  - Modify environment to reduce risk of injury  - Consider OT/PT consult to assist with strengthening/mobility  Outcome: Adequate for Discharge  Goal: Maintain or return to baseline ADL function  Description  INTERVENTIONS:  -  Assess patient's ability to carry out ADLs; assess patient's baseline for ADL function and identify physical deficits which impact ability to perform ADLs (bathing, care of mouth/teeth, toileting, grooming, dressing, etc )  - Assess/evaluate cause of self-care deficits   - Assess range of motion  - Assess patient's mobility; develop plan if impaired  - Assess patient's need for assistive devices and provide as appropriate  - Encourage maximum independence but intervene and supervise when necessary  - Involve family in performance of ADLs  - Assess for home care needs following discharge   - Consider OT consult to assist with ADL evaluation and planning for discharge  - Provide patient education as appropriate  Outcome: Adequate for Discharge  Goal: Maintain or return mobility status to optimal level  Description  INTERVENTIONS:  - Assess patient's baseline mobility status (ambulation, transfers, stairs, etc )    - Identify cognitive and physical deficits and behaviors that affect mobility  - Identify mobility aids required to assist with transfers and/or ambulation (gait belt, sit-to-stand, lift, walker, cane, etc )  - [place] fall precautions as indicated by assessment  - Record patient progress and toleration of activity level on Mobility SBAR; progress patient to next Phase/Stage  - Instruct patient to call for assistance with activity based on assessment  - Consider rehabilitation consult to assist with strengthening/weightbearing, etc   Outcome: Adequate for Discharge     Problem: Depression  Goal: Treatment Goal: Demonstrate behavioral control of depressive symptoms, verbalize feelings of improved mood/affect, and adopt new coping skills prior to discharge  Outcome: Adequate for Discharge  Goal: Verbalize thoughts and feelings  Description  Interventions:  - Assess and re-assess patient's level of risk   - Engage patient in 1:1 interactions, daily, for a minimum of 15 minutes   - Encourage patient to express feelings, fears, frustrations, hopes   Outcome: Adequate for Discharge  Goal: Refrain from harming self  Description  Interventions:  - Monitor patient closely, per order   - Supervise medication ingestion, monitor effects and side effects   Outcome: Adequate for Discharge  Goal: Refrain from isolation  Description  Interventions:  - Develop a trusting relationship   - Encourage socialization   Outcome: Adequate for Discharge  Goal: Refrain from self-neglect  Outcome: Adequate for Discharge  Goal: Attend and participate in unit activities, including therapeutic, recreational, and educational groups  Description  Interventions:  - Provide therapeutic and educational activities daily, encourage attendance and participation, and document same in the medical record   Outcome: Adequate for Discharge  Goal: Complete daily ADLs, including personal hygiene independently, as able  Description  Interventions:  - Observe, teach, and assist patient with ADLS  -  Monitor and promote a balance of rest/activity, with adequate nutrition and elimination   Outcome: Adequate for Discharge     Problem: Anxiety  Goal: Anxiety is at manageable level  Description  Interventions:  - Assess and monitor patient's anxiety level     - Monitor for signs and symptoms (heart palpitations, chest pain, shortness of breath, headaches, nausea, feeling jumpy, restlessness, irritable, apprehensive)  - Collaborate with interdisciplinary team and initiate plan and interventions as ordered    - [place] patient to unit/surroundings  - Explain treatment plan  - Encourage participation in care  - Encourage verbalization of concerns/fears  - Identify coping mechanisms  - Assist in developing anxiety-reducing skills  - Administer/offer alternative therapies  - Limit or eliminate stimulants  Outcome: Adequate for Discharge     Problem: Ineffective Coping  Goal: Participates in unit activities  Description  Interventions:  - Provide therapeutic environment   - Provide required programming   - Redirect inappropriate behaviors   Outcome: Adequate for Discharge

## 2019-09-20 NOTE — CASE MANAGEMENT
Received notice from [address] care and thy do not feel they can meet the needs of this pt  Since pt no longer wants HHC, this is not a concern

## 2019-09-20 NOTE — BH TRANSITION RECORD
Contact Information: If you have any questions, concerns, pended studies, tests and/or procedures, or emergencies regarding your inpatient behavioral health visit  Please contact [facility] older adult behavioral health unit 6B [phone] and ask to speak to a , nurse or physician  A contact is available 24 hours/ 7 days a week at this number  Summary of Procedures Performed During your Stay:  Below is a list of major procedures performed during your hospital stay and a summary of results:  - No major procedures performed  Pending Studies (From admission, onward)    None        If studies are pending at discharge, follow up with your PCP and/or referring provider

## 2019-09-20 NOTE — NURSING NOTE
AVS printed and went over with patient and   Pt and  stated verbal understanding  No new questions at this time  Pt has all belongings  Pt left the unit via w/c with  and MHT

## 2019-09-23 RX ORDER — DULOXETIN HYDROCHLORIDE 30 MG/1
60 CAPSULE, DELAYED RELEASE ORAL DAILY
Refills: 0 | Status: ON HOLD | COMMUNITY
Start: 2019-09-13 | End: 2019-10-03 | Stop reason: SDUPTHER

## 2019-09-23 NOTE — PROGRESS NOTES
Subjective:      Patient ID: Carlos Blackwood is a 62 y o  female  Chief Complaint   Patient presents with    Follow-up     post hospital f/u pain -confusion-fatigue--lj       Here for follow up of hypothyroidism  She feels tired but is aware her labwork was normal with her recent hospitalization  Her urine smells terrible and she has some intermittent burning for past 2-3 weeks  No fever or back pain  She had been in the mental health unit for about 17 days, was discharged 9/20  Mentally feels better but physically is not feeling well  Did call therapist Sangeeta García) today for appointment and has appointment upcoming for Dr Edyta Aparicio  Has upcoming appointment with Dr Chito Lacey for floaters and eye issues  The following portions of the patient's history were reviewed and updated as appropriate: allergies, current medications, past family history, past medical history, past social history, past surgical history and problem list     Review of Systems   Constitutional: Positive for fatigue  Respiratory: Negative  Cardiovascular: Negative            Current Outpatient Medications   Medication Sig Dispense Refill    ALPRAZolam (XANAX) 0 5 mg tablet       calcium carbonate-vitamin D (OSCAL-D) 500 mg-200 units per tablet Take 1 tablet by mouth daily with breakfast 30 tablet 0    ESOMEPRAZOLE MAGNESIUM PO       gabapentin (NEURONTIN) 300 mg capsule Take 2 capsules (600 mg total) by mouth daily at bedtime 60 capsule 0    gabapentin (NEURONTIN) 300 mg capsule Take 1 capsule (300 mg total) by mouth daily 30 capsule 0    levothyroxine 25 mcg tablet Take 1 tablet (25 mcg total) by mouth daily in the early morning 30 tablet 0    melatonin 3 mg Take 1 tablet (3 mg total) by mouth daily at bedtime 30 tablet 0    senna (SENOKOT) 8 6 mg Take 2 tablets (17 2 mg total) by mouth daily at bedtime 60 each 0    traZODone (DESYREL) 50 mg tablet Take 1 tablet (50 mg total) by mouth daily at bedtime as needed (insomnia) 30 tablet 0    DULoxetine (CYMBALTA) 30 mg delayed release capsule   0     No current facility-administered medications for this visit  Objective:    /60   Pulse 84   Temp 97 8 °F (36 6 °C)   Resp 18   Ht 5' 4" (1 626 m)   Wt 72 6 kg (160 lb)   BMI 27 46 kg/m²        Physical Exam   Constitutional: She appears well-developed and well-nourished  Eyes: Conjunctivae are normal    Neck: Neck supple  No JVD present  No thyromegaly present  Cardiovascular: Normal rate, regular rhythm, normal heart sounds and intact distal pulses  Exam reveals no gallop and no friction rub  No murmur heard  Pulmonary/Chest: Effort normal and breath sounds normal  She has no wheezes  She has no rales  Abdominal: Soft  Bowel sounds are normal  She exhibits no distension  There is no tenderness  Musculoskeletal: She exhibits no edema  Assessment/Plan:    Other specified hypothyroidism  Reviewed recent labwork with patient and TSH is normal, as is CBC  Suspect fatigue is more due to her psychiatric issues  Diagnoses and all orders for this visit:    Other specified hypothyroidism    Foul smelling urine  Comments:  Her UA is normal and she was advised to increase her fluids  Orders:  -     POCT urine dip auto non-scope    Other orders  -     DULoxetine (CYMBALTA) 30 mg delayed release capsule  -     ALPRAZolam (XANAX) 0 5 mg tablet  -     ESOMEPRAZOLE MAGNESIUM PO          Return in about 6 months (around 3/25/2020)         Kuldeep Kumar MD

## 2019-09-25 ENCOUNTER — OFFICE VISIT (OUTPATIENT)
Dept: FAMILY MEDICINE CLINIC | Facility: CLINIC | Age: 64
End: 2019-09-25
Payer: COMMERCIAL

## 2019-09-25 VITALS
BODY MASS INDEX: 27.31 KG/M2 | HEART RATE: 84 BPM | HEIGHT: 64 IN | TEMPERATURE: 97.8 F | WEIGHT: 160 LBS | SYSTOLIC BLOOD PRESSURE: 104 MMHG | RESPIRATION RATE: 18 BRPM | DIASTOLIC BLOOD PRESSURE: 60 MMHG

## 2019-09-25 DIAGNOSIS — E03.8 OTHER SPECIFIED HYPOTHYROIDISM: Primary | ICD-10-CM

## 2019-09-25 DIAGNOSIS — R82.90 FOUL SMELLING URINE: ICD-10-CM

## 2019-09-25 LAB
SL AMB  POCT GLUCOSE, UA: ABNORMAL
SL AMB LEUKOCYTE ESTERASE,UA: ABNORMAL
SL AMB POCT BILIRUBIN,UA: ABNORMAL
SL AMB POCT BLOOD,UA: ABNORMAL
SL AMB POCT CLARITY,UA: CLEAR
SL AMB POCT COLOR,UA: ABNORMAL
SL AMB POCT KETONES,UA: ABNORMAL
SL AMB POCT NITRITE,UA: ABNORMAL
SL AMB POCT PH,UA: 5
SL AMB POCT SPECIFIC GRAVITY,UA: 1.02
SL AMB POCT URINE PROTEIN: ABNORMAL
SL AMB POCT UROBILINOGEN: ABNORMAL

## 2019-09-25 PROCEDURE — 81003 URINALYSIS AUTO W/O SCOPE: CPT | Performed by: INTERNAL MEDICINE

## 2019-09-25 PROCEDURE — 99213 OFFICE O/P EST LOW 20 MIN: CPT | Performed by: INTERNAL MEDICINE

## 2019-09-25 RX ORDER — ALPRAZOLAM 0.5 MG/1
TABLET ORAL
COMMUNITY
Start: 2019-07-09 | End: 2019-10-04 | Stop reason: HOSPADM

## 2019-09-25 NOTE — ASSESSMENT & PLAN NOTE
Reviewed recent labwork with patient and TSH is normal, as is CBC  Suspect fatigue is more due to her psychiatric issues

## 2019-09-27 ENCOUNTER — APPOINTMENT (EMERGENCY)
Dept: CT IMAGING | Facility: HOSPITAL | Age: 64
End: 2019-09-27
Payer: COMMERCIAL

## 2019-09-27 ENCOUNTER — HOSPITAL ENCOUNTER (EMERGENCY)
Facility: HOSPITAL | Age: 64
Discharge: HOME | End: 2019-09-27
Attending: EMERGENCY MEDICINE | Admitting: EMERGENCY MEDICINE
Payer: COMMERCIAL

## 2019-09-27 ENCOUNTER — OFFICE VISIT (OUTPATIENT)
Dept: ENDOCRINOLOGY | Facility: CLINIC | Age: 64
End: 2019-09-27
Payer: COMMERCIAL

## 2019-09-27 VITALS
OXYGEN SATURATION: 99 % | SYSTOLIC BLOOD PRESSURE: 143 MMHG | HEART RATE: 82 BPM | TEMPERATURE: 98 F | BODY MASS INDEX: 27.32 KG/M2 | RESPIRATION RATE: 16 BRPM | DIASTOLIC BLOOD PRESSURE: 83 MMHG | WEIGHT: 159.17 LBS

## 2019-09-27 VITALS
WEIGHT: 159 LBS | DIASTOLIC BLOOD PRESSURE: 72 MMHG | HEART RATE: 102 BPM | SYSTOLIC BLOOD PRESSURE: 120 MMHG | HEIGHT: 64 IN | BODY MASS INDEX: 27.14 KG/M2

## 2019-09-27 DIAGNOSIS — K59.00 CONSTIPATION: ICD-10-CM

## 2019-09-27 DIAGNOSIS — R10.9 ABDOMINAL PAIN: Primary | ICD-10-CM

## 2019-09-27 DIAGNOSIS — E03.9 ACQUIRED HYPOTHYROIDISM: Primary | ICD-10-CM

## 2019-09-27 LAB
ALBUMIN SERPL BCP-MCNC: 3.5 G/DL (ref 3.5–5)
ALP SERPL-CCNC: 66 U/L (ref 46–116)
ALT SERPL W P-5'-P-CCNC: 28 U/L (ref 12–78)
ANION GAP SERPL CALCULATED.3IONS-SCNC: 4 MMOL/L (ref 4–13)
AST SERPL W P-5'-P-CCNC: 40 U/L (ref 5–45)
BACTERIA UR QL AUTO: NORMAL /HPF
BASOPHILS # BLD AUTO: 0.04 THOUSANDS/ΜL (ref 0–0.1)
BASOPHILS NFR BLD AUTO: 1 % (ref 0–1)
BILIRUB SERPL-MCNC: 0.4 MG/DL (ref 0.2–1)
BILIRUB UR QL STRIP: NEGATIVE
BUN SERPL-MCNC: 7 MG/DL (ref 5–25)
CALCIUM SERPL-MCNC: 9.2 MG/DL (ref 8.3–10.1)
CHLORIDE SERPL-SCNC: 106 MMOL/L (ref 100–108)
CLARITY UR: CLEAR
CO2 SERPL-SCNC: 30 MMOL/L (ref 21–32)
COLOR UR: YELLOW
CREAT SERPL-MCNC: 0.88 MG/DL (ref 0.6–1.3)
EOSINOPHIL # BLD AUTO: 0.28 THOUSAND/ΜL (ref 0–0.61)
EOSINOPHIL NFR BLD AUTO: 4 % (ref 0–6)
ERYTHROCYTE [DISTWIDTH] IN BLOOD BY AUTOMATED COUNT: 13.8 % (ref 11.6–15.1)
GFR SERPL CREATININE-BSD FRML MDRD: 70 ML/MIN/1.73SQ M
GLUCOSE SERPL-MCNC: 97 MG/DL (ref 65–140)
GLUCOSE UR STRIP-MCNC: NEGATIVE MG/DL
HCT VFR BLD AUTO: 35.7 % (ref 34.8–46.1)
HGB BLD-MCNC: 11.5 G/DL (ref 11.5–15.4)
HGB UR QL STRIP.AUTO: NEGATIVE
IMM GRANULOCYTES # BLD AUTO: 0.02 THOUSAND/UL (ref 0–0.2)
IMM GRANULOCYTES NFR BLD AUTO: 0 % (ref 0–2)
KETONES UR STRIP-MCNC: NEGATIVE MG/DL
LEUKOCYTE ESTERASE UR QL STRIP: ABNORMAL
LIPASE SERPL-CCNC: 61 U/L (ref 73–393)
LYMPHOCYTES # BLD AUTO: 1.36 THOUSANDS/ΜL (ref 0.6–4.47)
LYMPHOCYTES NFR BLD AUTO: 20 % (ref 14–44)
MCH RBC QN AUTO: 27.6 PG (ref 26.8–34.3)
MCHC RBC AUTO-ENTMCNC: 32.2 G/DL (ref 31.4–37.4)
MCV RBC AUTO: 86 FL (ref 82–98)
MONOCYTES # BLD AUTO: 0.68 THOUSAND/ΜL (ref 0.17–1.22)
MONOCYTES NFR BLD AUTO: 10 % (ref 4–12)
NEUTROPHILS # BLD AUTO: 4.59 THOUSANDS/ΜL (ref 1.85–7.62)
NEUTS SEG NFR BLD AUTO: 65 % (ref 43–75)
NITRITE UR QL STRIP: NEGATIVE
NON-SQ EPI CELLS URNS QL MICRO: NORMAL /HPF
NRBC BLD AUTO-RTO: 0 /100 WBCS
PH UR STRIP.AUTO: 7.5 [PH] (ref 4.5–8)
PLATELET # BLD AUTO: 225 THOUSANDS/UL (ref 149–390)
PMV BLD AUTO: 10 FL (ref 8.9–12.7)
POTASSIUM SERPL-SCNC: 5.2 MMOL/L (ref 3.5–5.3)
PROT SERPL-MCNC: 6.8 G/DL (ref 6.4–8.2)
PROT UR STRIP-MCNC: NEGATIVE MG/DL
RBC # BLD AUTO: 4.16 MILLION/UL (ref 3.81–5.12)
RBC #/AREA URNS AUTO: NORMAL /HPF
SODIUM SERPL-SCNC: 140 MMOL/L (ref 136–145)
SP GR UR STRIP.AUTO: 1.01 (ref 1–1.03)
UROBILINOGEN UR QL STRIP.AUTO: 0.2 E.U./DL
WBC # BLD AUTO: 6.97 THOUSAND/UL (ref 4.31–10.16)
WBC #/AREA URNS AUTO: NORMAL /HPF

## 2019-09-27 PROCEDURE — 74177 CT ABD & PELVIS W/CONTRAST: CPT

## 2019-09-27 PROCEDURE — 99213 OFFICE O/P EST LOW 20 MIN: CPT | Performed by: INTERNAL MEDICINE

## 2019-09-27 PROCEDURE — 81001 URINALYSIS AUTO W/SCOPE: CPT

## 2019-09-27 PROCEDURE — 83690 ASSAY OF LIPASE: CPT | Performed by: EMERGENCY MEDICINE

## 2019-09-27 PROCEDURE — 96374 THER/PROPH/DIAG INJ IV PUSH: CPT

## 2019-09-27 PROCEDURE — 96375 TX/PRO/DX INJ NEW DRUG ADDON: CPT

## 2019-09-27 PROCEDURE — 96361 HYDRATE IV INFUSION ADD-ON: CPT

## 2019-09-27 PROCEDURE — 36415 COLL VENOUS BLD VENIPUNCTURE: CPT | Performed by: EMERGENCY MEDICINE

## 2019-09-27 PROCEDURE — 99284 EMERGENCY DEPT VISIT MOD MDM: CPT | Performed by: EMERGENCY MEDICINE

## 2019-09-27 PROCEDURE — 85025 COMPLETE CBC W/AUTO DIFF WBC: CPT | Performed by: EMERGENCY MEDICINE

## 2019-09-27 PROCEDURE — 80053 COMPREHEN METABOLIC PANEL: CPT | Performed by: EMERGENCY MEDICINE

## 2019-09-27 PROCEDURE — 99284 EMERGENCY DEPT VISIT MOD MDM: CPT

## 2019-09-27 RX ORDER — GLUCOSAMINE SULFATE 500 MG
1 TABLET ORAL
Refills: 0 | COMMUNITY
Start: 2019-09-20 | End: 2019-09-27 | Stop reason: SDUPTHER

## 2019-09-27 RX ORDER — KETOROLAC TROMETHAMINE 30 MG/ML
15 INJECTION, SOLUTION INTRAMUSCULAR; INTRAVENOUS ONCE
Status: COMPLETED | OUTPATIENT
Start: 2019-09-27 | End: 2019-09-27

## 2019-09-27 RX ORDER — ESOMEPRAZOLE MAGNESIUM 40 MG/1
40 CAPSULE, DELAYED RELEASE ORAL
COMMUNITY
End: 2019-10-04 | Stop reason: HOSPADM

## 2019-09-27 RX ORDER — ONDANSETRON 2 MG/ML
4 INJECTION INTRAMUSCULAR; INTRAVENOUS ONCE
Status: COMPLETED | OUTPATIENT
Start: 2019-09-27 | End: 2019-09-27

## 2019-09-27 RX ADMIN — ONDANSETRON 4 MG: 2 INJECTION INTRAMUSCULAR; INTRAVENOUS at 16:29

## 2019-09-27 RX ADMIN — SODIUM CHLORIDE 1000 ML: 0.9 INJECTION, SOLUTION INTRAVENOUS at 16:25

## 2019-09-27 RX ADMIN — IOHEXOL 100 ML: 350 INJECTION, SOLUTION INTRAVENOUS at 17:29

## 2019-09-27 RX ADMIN — KETOROLAC TROMETHAMINE 15 MG: 30 INJECTION, SOLUTION INTRAMUSCULAR at 16:29

## 2019-09-27 NOTE — ED PROVIDER NOTES
History  Chief Complaint   Patient presents with    Abdominal Pain     abd pain with bloating onset yesterday     64-year-old female presents to the emergency department for evaluation of left lower quadrant abdominal pain  Patient states that her pain has progressed over the past 1 day  She reports having associated dry mouth and increased urinary frequency  Patient has been having issues with constipation and has had infrequent bowel movements over the past few weeks  She has been taking a bowel regimen without relief  Patient states she also has intermittent right upper quadrant pain that is described as sharp  She has had a previous cholecystectomy and appendectomy  Patient is also having occasional left-sided CVA tenderness  She denies fevers or chills  She does feel nauseated and feels that her abdomen is bloated  She reports decreased appetite  Patient was recently admitted to a psychiatric facility for psychogenic weakness  History provided by:  Patient and medical records   used: No    Abdominal Pain   Pain location:  LLQ  Pain quality: aching    Pain radiates to:  Does not radiate  Pain severity:  Moderate  Onset quality:  Gradual  Duration:  1 day  Timing:  Constant  Progression:  Worsening  Context: laxative use and previous surgery    Context: not awakening from sleep, not diet changes, not eating and not suspicious food intake    Relieved by:  Nothing  Worsened by:  Nothing  Ineffective treatments:  Position changes and bowel activity  Associated symptoms: constipation and nausea    Associated symptoms: no anorexia, no belching, no diarrhea and no fever    Risk factors: no recent hospitalization        Prior to Admission Medications   Prescriptions Last Dose Informant Patient Reported? Taking?    ALPRAZolam (XANAX) 0 5 mg tablet   Yes No   DULoxetine (CYMBALTA) 30 mg delayed release capsule   Yes No   Si mg    ESOMEPRAZOLE MAGNESIUM PO   Yes No   calcium carbonate-vitamin D (OSCAL-D) 500 mg-200 units per tablet   No No   Sig: Take 1 tablet by mouth daily with breakfast   esomeprazole (NexIUM) 40 MG capsule   Yes No   Sig: Take 40 mg by mouth every morning before breakfast   gabapentin (NEURONTIN) 300 mg capsule   No No   Sig: Take 2 capsules (600 mg total) by mouth daily at bedtime   gabapentin (NEURONTIN) 300 mg capsule   No No   Sig: Take 1 capsule (300 mg total) by mouth daily   levothyroxine 25 mcg tablet   No No   Sig: Take 1 tablet (25 mcg total) by mouth daily in the early morning   melatonin 3 mg   No No   Sig: Take 1 tablet (3 mg total) by mouth daily at bedtime   senna (SENOKOT) 8 6 mg   No No   Sig: Take 2 tablets (17 2 mg total) by mouth daily at bedtime   traZODone (DESYREL) 50 mg tablet   No No   Sig: Take 1 tablet (50 mg total) by mouth daily at bedtime as needed (insomnia)      Facility-Administered Medications: None       Past Medical History:   Diagnosis Date    Abdominal adhesions     Anesthesia complication     difficult to wake up    Depression     Disease of thyroid gland     Fibromyalgia     GERD (gastroesophageal reflux disease)     Lazy eye     resolved: 3/27/17    Osteopenia     with joint pain-elevated DEV    Psychiatric disorder     Tinnitus     Wears glasses     for reading       Past Surgical History:   Procedure Laterality Date    ABDOMINAL SURGERY      lysis of adhesions x 2    APPENDECTOMY      CERVICAL FUSION      CHOLECYSTECTOMY      open    DILATION AND CURETTAGE OF UTERUS      NEUROMA EXCISION Right 5/26/2017    Procedure: EXCISION MASS / FIBROMA FOOT;  Surgeon: Nick Morgan DPM;  Location: [facility];  Service:     RIGHT OOPHORECTOMY      WISDOM TOOTH EXTRACTION      x4       Family History   Problem Relation Age of Onset    Heart disease Mother     Stroke Mother 58    COPD Mother     Arthritis Mother     Hypertension Father     Kidney disease Brother         kidney transplant    No Known Problems Sister     No Known Problems Maternal Aunt     No Known Problems Maternal Uncle     No Known Problems Paternal Aunt     No Known Problems Paternal Uncle     No Known Problems Maternal Grandmother     No Known Problems Maternal Grandfather     No Known Problems Paternal Grandmother     No Known Problems Paternal Grandfather     ADD / ADHD Neg Hx     Anesthesia problems Neg Hx     Cancer Neg Hx     Clotting disorder Neg Hx     Collagen disease Neg Hx     Diabetes Neg Hx     Dislocations Neg Hx     Learning disabilities Neg Hx     Neurological problems Neg Hx     Osteoporosis Neg Hx     Rheumatologic disease Neg Hx     Scoliosis Neg Hx     Vascular Disease Neg Hx      I have reviewed and agree with the history as documented  Social History     Tobacco Use    Smoking status: Never Smoker    Smokeless tobacco: Never Used   Substance Use Topics    Alcohol use: Not Currently     Frequency: Never     Binge frequency: Never    Drug use: No        Review of Systems   Constitutional: Negative for appetite change and fever  HENT:        Dry mouth   Gastrointestinal: Positive for abdominal distention, abdominal pain, constipation and nausea  Negative for anorexia and diarrhea  Musculoskeletal: Negative for back pain  All other systems reviewed and are negative  Physical Exam  Physical Exam   Constitutional: She is oriented to person, place, and time  She appears well-developed and well-nourished  She does not appear ill  No distress  HENT:   Head: Normocephalic  Nose: Nose normal    Mouth/Throat: Uvula is midline and oropharynx is clear and moist  Mucous membranes are dry  No oropharyngeal exudate  Eyes: Pupils are equal, round, and reactive to light  Conjunctivae and EOM are normal    Neck: Normal range of motion  Neck supple  Cardiovascular: Normal rate, regular rhythm, normal heart sounds and intact distal pulses     Pulmonary/Chest: Effort normal and breath sounds normal  Abdominal: Soft  Bowel sounds are normal  She exhibits no distension  There is tenderness in the right upper quadrant, right lower quadrant and left lower quadrant  There is CVA tenderness  There is no rebound and no guarding  No hernia  Musculoskeletal: Normal range of motion  She exhibits no edema, tenderness or deformity  Lymphadenopathy:     She has no cervical adenopathy  Neurological: She is alert and oriented to person, place, and time  She has normal strength and normal reflexes  No cranial nerve deficit or sensory deficit  She exhibits normal muscle tone  Coordination and gait normal    Skin: Skin is warm, dry and intact  No rash noted  Psychiatric: She has a normal mood and affect  Her behavior is normal  Judgment and thought content normal    Nursing note and vitals reviewed        Vital Signs  ED Triage Vitals   Temperature Pulse Respirations Blood Pressure SpO2   09/27/19 1541 09/27/19 1541 09/27/19 1541 09/27/19 1541 09/27/19 1541   98 °F (36 7 °C) (!) 106 16 162/92 97 %      Temp Source Heart Rate Source Patient Position - Orthostatic VS BP Location FiO2 (%)   09/27/19 1541 09/27/19 1541 09/27/19 1630 09/27/19 1541 --   Oral Monitor Sitting Right arm       Pain Score       09/27/19 1541       7           Vitals:    09/27/19 1541 09/27/19 1630   BP: 162/92 143/83   Pulse: (!) 106 82   Patient Position - Orthostatic VS:  Sitting         Visual Acuity      ED Medications  Medications   sodium chloride 0 9 % bolus 1,000 mL (0 mL Intravenous Stopped 9/27/19 1813)   ondansetron (ZOFRAN) injection 4 mg (4 mg Intravenous Given 9/27/19 1629)   ketorolac (TORADOL) injection 15 mg (15 mg Intravenous Given 9/27/19 1629)   iohexol (OMNIPAQUE) 350 MG/ML injection (MULTI-DOSE) 100 mL (100 mL Intravenous Given 9/27/19 1729)       Diagnostic Studies  Results Reviewed     Procedure Component Value Units Date/Time    Urine Microscopic [[id number]]  (Normal) Collected:  09/27/19 1638    Lab Status:  Final result Specimen:  Urine, Clean Catch Updated:  09/27/19 1658     RBC, UA None Seen /hpf      WBC, UA None Seen /hpf      Epithelial Cells Occasional /hpf      Bacteria, UA None Seen /hpf     Comprehensive metabolic panel [[id number]] Collected:  09/27/19 1617    Lab Status:  Final result Specimen:  Blood from Arm, Right Updated:  09/27/19 1639     Sodium 140 mmol/L      Potassium 5 2 mmol/L      Chloride 106 mmol/L      CO2 30 mmol/L      ANION GAP 4 mmol/L      BUN 7 mg/dL      Creatinine 0 88 mg/dL      Glucose 97 mg/dL      Calcium 9 2 mg/dL      AST 40 U/L      ALT 28 U/L      Alkaline Phosphatase 66 U/L      Total Protein 6 8 g/dL      Albumin 3 5 g/dL      Total Bilirubin 0 40 mg/dL      eGFR 70 ml/min/1 73sq m     Narrative:       [facility] guidelines for Chronic Kidney Disease (CKD):     Stage 1 with normal or high GFR (GFR > 90 mL/min/1 73 square meters)    Stage 2 Mild CKD (GFR = 60-89 mL/min/1 73 square meters)    Stage 3A Moderate CKD (GFR = 45-59 mL/min/1 73 square meters)    Stage 3B Moderate CKD (GFR = 30-44 mL/min/1 73 square meters)    Stage 4 Severe CKD (GFR = 15-29 mL/min/1 73 square meters)    Stage 5 End Stage CKD (GFR <15 mL/min/1 73 square meters)  Note: GFR calculation is accurate only with a steady state creatinine    Lipase [[id number]]  (Abnormal) Collected:  09/27/19 1617    Lab Status:  Final result Specimen:  Blood from Arm, Right Updated:  09/27/19 1639     Lipase 61 u/L     ED Urine Macroscopic [[id number]]  (Abnormal) Collected:  09/27/19 1638    Lab Status:  Final result Specimen:  Urine Updated:  09/27/19 1624     Color, UA Yellow     Clarity, UA Clear     pH, UA 7 5     Leukocytes, UA Trace     Nitrite, UA Negative     Protein, UA Negative mg/dl      Glucose, UA Negative mg/dl      Ketones, UA Negative mg/dl      Urobilinogen, UA 0 2 E U /dl      Bilirubin, UA Negative     Blood, UA Negative     Specific Gravity, UA 1 010    Narrative:       CLINITEK RESULT    CBC and differential [[id number]] Collected:  09/27/19 1617    Lab Status:  Final result Specimen:  Blood from Arm, Right Updated:  09/27/19 1623     WBC 6 97 Thousand/uL      RBC 4 16 Million/uL      Hemoglobin 11 5 g/dL      Hematocrit 35 7 %      MCV 86 fL      MCH 27 6 pg      MCHC 32 2 g/dL      RDW 13 8 %      MPV 10 0 fL      Platelets 919 Thousands/uL      nRBC 0 /100 WBCs      Neutrophils Relative 65 %      Immat GRANS % 0 %      Lymphocytes Relative 20 %      Monocytes Relative 10 %      Eosinophils Relative 4 %      Basophils Relative 1 %      Neutrophils Absolute 4 59 Thousands/µL      Immature Grans Absolute 0 02 Thousand/uL      Lymphocytes Absolute 1 36 Thousands/µL      Monocytes Absolute 0 68 Thousand/µL      Eosinophils Absolute 0 28 Thousand/µL      Basophils Absolute 0 04 Thousands/µL                  CT abdomen pelvis with contrast   Final Result by Caleb Keith MD (09/27 0295)      No evidence of diverticulitis  No findings to account for abdominal pain  Cholecystectomy noted            Workstation performed: [id number]                    Procedures  Procedures       ED Course                               MDM  Number of Diagnoses or Management Options  Abdominal pain: new and requires workup  Constipation: new and requires workup     Amount and/or Complexity of Data Reviewed  Clinical lab tests: reviewed and ordered  Tests in the radiology section of CPT®: reviewed and ordered  Decide to obtain previous medical records or to obtain history from someone other than the patient: yes  Independent visualization of images, tracings, or specimens: yes    Risk of Complications, Morbidity, and/or Mortality  General comments:   28-year-old female with a history of constipation presents with worsening left lower quadrant abdominal pain  Patient's workup in the department is unremarkable  She has a normal CT scan and normal blood work    She does have trace leukocytes but no sign of acute urinary tract infection  Discussed signs and symptoms to return to the emergency department  Patient to continue her bowel regimen that she started on Monday of this week  Patient Progress  Patient progress: stable      Disposition  Final diagnoses:   Abdominal pain   Constipation     Time reflects when diagnosis was documented in both MDM as applicable and the Disposition within this note     Time User Action Codes Description Comment    9/27/2019  6:10 PM Andressa Carreon Add [R10 9] Abdominal pain     9/27/2019  6:11 PM [facility], Andressa Add [K59 00] Constipation       ED Disposition     ED Disposition Condition Date/Time Comment    Discharge Stable Fri Sep 27, 2019  6:10 PM Olivia Smoker discharge to home/self care  Follow-up Information     Follow up With Specialties Details Why Contact Info    Stella Aguero MD Internal Medicine, Family Medicine Schedule an appointment as soon as possible for a visit in 3 days For recheck of current symptoms [address] [place] [address]  [phone]            Discharge Medication List as of 9/27/2019  6:11 PM      CONTINUE these medications which have NOT CHANGED    Details   ALPRAZolam (XANAX) 0 5 mg tablet Starting Tue 7/9/2019, Historical Med      calcium carbonate-vitamin D (OSCAL-D) 500 mg-200 units per tablet Take 1 tablet by mouth daily with breakfast, Starting Mon 9/16/2019, Print      DULoxetine (CYMBALTA) 30 mg delayed release capsule 60 mg , Starting Fri 9/13/2019, Historical Med      !! esomeprazole (NexIUM) 40 MG capsule Take 40 mg by mouth every morning before breakfast, Historical Med      !! ESOMEPRAZOLE MAGNESIUM PO Starting Tue 8/13/2019, Historical Med      !! gabapentin (NEURONTIN) 300 mg capsule Take 2 capsules (600 mg total) by mouth daily at bedtime, Starting Mon 9/16/2019, Print      !! gabapentin (NEURONTIN) 300 mg capsule Take 1 capsule (300 mg total) by mouth daily, Starting Mon 9/16/2019, Print      levothyroxine 25 mcg tablet Take 1 tablet (25 mcg total) by mouth daily in the early morning, Starting Mon 9/16/2019, Print      melatonin 3 mg Take 1 tablet (3 mg total) by mouth daily at bedtime, Starting Mon 9/16/2019, Print      senna (SENOKOT) 8 6 mg Take 2 tablets (17 2 mg total) by mouth daily at bedtime, Starting Mon 9/16/2019, Print      traZODone (DESYREL) 50 mg tablet Take 1 tablet (50 mg total) by mouth daily at bedtime as needed (insomnia), Starting Mon 9/16/2019, Print       !! - Potential duplicate medications found  Please discuss with provider  No discharge procedures on file      ED Provider  Electronically Signed by           Sky Tellez DO  09/28/19 4388

## 2019-09-27 NOTE — TELEPHONE ENCOUNTER
I called and left a VM for the patient to return my call in regards to her MRI results    (Patient was discharged from hospital)

## 2019-09-27 NOTE — PROGRESS NOTES
ENDOCRINOLOGY  FOLLOW UP VISIT      Reason for Endocrine Consult/Chief Complaint: thyroid disorder    Referring Provider: Aury Vazquez MD        ?  Medical Decision Making:     Impression  1  Acquired hypothyroidism  2  Hx of prediabetes  3  HLD  4  Osteopenia   5  Fibromyalgia  6  LLQ abdominal pain- new onset         Recommendations:     She has been having some LLQ pain and associated bloating  Exam is mildly tender in LLQ, no rebound/guarding  No systemic symptoms  She had XRAY abdomen a few weeks ago with large stool burden  She is already on senna and miralax  Has hx of diverticuli on colonoscopy 2017  Instructed to call her GI physician Dr Lester Wilcox for further recommendations to treat constipation  Instructed if develops fever/chills/worsening abdominal pain to go to ER to r/o diverticulitis  I explained that hypothyroidism likely not resulting in symptoms, we will check her TSH, FT4 in 4 weeks  I also explained we will check TPO Ab to confirm Hashimoto's thyroiditis as the cause for her hypothyroidism        RTC in 5 weeks      Hx of prediabetes- consider metformin for prevention of progression--will discuss at next appt     HLD- LDL should mildly improve with levothyroxine replacement- will discuss at next appt     Osteopenia- due for repeat DEXA 2019- will discuss at next appt    Deepa CASTELLANO            History of Present Illness:  Mrs Warren Huffman is a Voncille Flavors old female who presents for thyroid disorder       No hx of neck radiation       PMH-osteopenia, fibromyalgia, GERD, prediabetes, HLD  PSH-appendectomy, cholecystectomy, cervical fusion, wisdom tooth, right oophorectomy  FHx-sisters on thyroid medication   SHx-negative ETOH, retired but works       Events since last visit:     Presently on 25mcg/day levothyroxine  She was hospitalized recently in psych unit for depression   Feeling bloated  Urinating frequently but had UA negative   Some nausea but no vomiting  Tongue feels like burnt Having constipation- had BM this morning though     ? Review of Systems:     Review of Systems   Constitutional: Negative for appetite change, chills, diaphoresis, fatigue, fever and unexpected weight change  HENT: Negative for congestion, ear pain, hearing loss, rhinorrhea, sinus pressure, sinus pain, sore throat, trouble swallowing and voice change  Eyes: Negative for photophobia, redness and visual disturbance  Respiratory: Negative for apnea, cough, chest tightness, shortness of breath, wheezing and stridor  Cardiovascular: Negative for chest pain, palpitations and leg swelling  Gastrointestinal: +nausea, abdominal bloating, constipation   Endocrine: Negative for cold intolerance, heat intolerance, polydipsia, polyphagia and polyuria  Genitourinary: Negative for difficulty urinating, dysuria, flank pain, frequency, hematuria and urgency  Musculoskeletal: Negative for arthralgias, back pain, gait problem, joint swelling and myalgias  Skin: Negative for color change, pallor, rash and wound  Allergic/Immunologic: Negative for immunocompromised state  Neurological: Negative for dizziness, tremors, syncope, weakness, light-headedness and headaches  Hematological: Negative for adenopathy  Does not bruise/bleed easily  Psychiatric/Behavioral: Negative for confusion and sleep disturbance  The patient is not nervous/anxious  ?   Patient History:     Past Medical History:   Diagnosis Date    Anesthesia complication     difficult to wake up    Disease of thyroid gland     Fibromyalgia     GERD (gastroesophageal reflux disease)     Lazy eye     resolved: 3/27/17    Osteopenia     with joint pain-elevated DEV    Tinnitus     Wears glasses     for reading     Past Surgical History:   Procedure Laterality Date    ABDOMINAL SURGERY      lysis of adhesions x 2    APPENDECTOMY      CERVICAL FUSION      CHOLECYSTECTOMY      open    DILATION AND CURETTAGE OF UTERUS      NEUROMA EXCISION Right 5/26/2017    Procedure: EXCISION MASS / FIBROMA FOOT;  Surgeon: Jose Alberto Daily DPM;  Location: [facility];  Service:     RIGHT OOPHORECTOMY      WISDOM TOOTH EXTRACTION      x4     Social History     Socioeconomic History    Marital status: /Civil Union     Spouse name: Not on file    Number of children: Not on file    Years of education: Not on file    Highest education level: Not on file   Occupational History    Not on file   Social Needs    Financial resource strain: Not on file    Food insecurity:     Worry: Not on file     Inability: Not on file    Transportation needs:     Medical: Not on file     Non-medical: Not on file   Tobacco Use    Smoking status: Never Smoker    Smokeless tobacco: Never Used   Substance and Sexual Activity    Alcohol use: Not Currently     Frequency: Never     Binge frequency: Never    Drug use: No    Sexual activity: Not Currently   Lifestyle    Physical activity:     Days per week: Not on file     Minutes per session: Not on file    Stress: Not on file   Relationships    Social connections:     Talks on phone: Not on file     Gets together: Not on file     Attends Jain service: Not on file     Active member of club or organization: Not on file     Attends meetings of clubs or organizations: Not on file     Relationship status: Not on file    Intimate partner violence:     Fear of current or ex partner: Not on file     Emotionally abused: Not on file     Physically abused: Not on file     Forced sexual activity: Not on file   Other Topics Concern    Not on file   Social History Narrative    Not on file     Family History   Problem Relation Age of Onset    Heart disease Mother     Stroke Mother 58    COPD Mother     Arthritis Mother     Hypertension Father     Kidney disease Brother         kidney transplant    No Known Problems Sister     No Known Problems Maternal Aunt     No Known Problems Maternal Uncle     No Known Problems Paternal Aunt     No Known Problems Paternal Uncle     No Known Problems Maternal Grandmother     No Known Problems Maternal Grandfather     No Known Problems Paternal Grandmother     No Known Problems Paternal Grandfather     ADD / ADHD Neg Hx     Anesthesia problems Neg Hx     Cancer Neg Hx     Clotting disorder Neg Hx     Collagen disease Neg Hx     Diabetes Neg Hx     Dislocations Neg Hx     Learning disabilities Neg Hx     Neurological problems Neg Hx     Osteoporosis Neg Hx     Rheumatologic disease Neg Hx     Scoliosis Neg Hx     Vascular Disease Neg Hx        Current Medications: At the time this note was written these were the medications the patient was on  Current Outpatient Medications   Medication Sig Dispense Refill    ALPRAZolam (XANAX) 0 5 mg tablet       calcium carbonate-vitamin D (OSCAL-D) 500 mg-200 units per tablet Take 1 tablet by mouth daily with breakfast 30 tablet 0    DULoxetine (CYMBALTA) 30 mg delayed release capsule   0    ESOMEPRAZOLE MAGNESIUM PO       gabapentin (NEURONTIN) 300 mg capsule Take 2 capsules (600 mg total) by mouth daily at bedtime 60 capsule 0    gabapentin (NEURONTIN) 300 mg capsule Take 1 capsule (300 mg total) by mouth daily 30 capsule 0    levothyroxine 25 mcg tablet Take 1 tablet (25 mcg total) by mouth daily in the early morning 30 tablet 0    melatonin 3 mg Take 1 tablet (3 mg total) by mouth daily at bedtime 30 tablet 0    senna (SENOKOT) 8 6 mg Take 2 tablets (17 2 mg total) by mouth daily at bedtime 60 each 0    traZODone (DESYREL) 50 mg tablet Take 1 tablet (50 mg total) by mouth daily at bedtime as needed (insomnia) 30 tablet 0     No current facility-administered medications for this visit          Allergies: Demerol [meperidine] and Sulfa antibiotics    Physical Exam:   Vital Signs:   /72   Pulse 102   Ht 5' 4" (1 626 m)   Wt 72 1 kg (159 lb)   BMI 27 29 kg/m²     Physical Exam   Constitutional: She is oriented to person, place, and time  She appears well-developed and well-nourished  HENT:   Head: Normocephalic and atraumatic  Nose: Nose normal    Mouth/Throat: Oropharynx is clear and moist  No oropharyngeal exudate  Eyes: Pupils are equal, round, and reactive to light  Conjunctivae and EOM are normal    Neck: Normal range of motion  Neck supple  No thyromegaly present  Cardiovascular: Normal rate, regular rhythm and normal heart sounds  Exam reveals no gallop and no friction rub  No murmur heard  Pulmonary/Chest: Effort normal and breath sounds normal  No stridor  No respiratory distress  She has no wheezes  She has no rales  Abdominal: Soft  Bowel sounds are normal  She exhibits distension  She exhibits no mass  There is tenderness  There is no rebound and no guarding    +mild distension, hyperresonant on percussion, LLQ mild pain on palpation     Musculoskeletal: Normal range of motion  She exhibits no edema  Lymphadenopathy:     She has no cervical adenopathy  Neurological: She is alert and oriented to person, place, and time  Skin: Skin is warm and dry  No erythema  Psychiatric: She has a normal mood and affect   Her behavior is normal  Judgment and thought content normal         Labs and Imaging:      Component      Latest Ref Rng & Units 9/3/2019   TSH 3RD GENERATON      0 358 - 3 740 uIU/mL 3 404

## 2019-09-28 ENCOUNTER — HOSPITAL ENCOUNTER (EMERGENCY)
Facility: HOSPITAL | Age: 64
End: 2019-09-28
Attending: EMERGENCY MEDICINE | Admitting: EMERGENCY MEDICINE
Payer: COMMERCIAL

## 2019-09-28 ENCOUNTER — HOSPITAL ENCOUNTER (INPATIENT)
Facility: HOSPITAL | Age: 64
LOS: 6 days | Discharge: HOME | DRG: 885 | End: 2019-10-04
Attending: PSYCHIATRY & NEUROLOGY | Admitting: PSYCHIATRY & NEUROLOGY
Payer: COMMERCIAL

## 2019-09-28 ENCOUNTER — APPOINTMENT (EMERGENCY)
Dept: CT IMAGING | Facility: HOSPITAL | Age: 64
End: 2019-09-28
Payer: COMMERCIAL

## 2019-09-28 VITALS
OXYGEN SATURATION: 96 % | RESPIRATION RATE: 15 BRPM | SYSTOLIC BLOOD PRESSURE: 115 MMHG | WEIGHT: 159.61 LBS | TEMPERATURE: 98.5 F | HEART RATE: 78 BPM | BODY MASS INDEX: 27.4 KG/M2 | DIASTOLIC BLOOD PRESSURE: 75 MMHG

## 2019-09-28 DIAGNOSIS — R10.84 GENERALIZED ABDOMINAL PAIN: Primary | ICD-10-CM

## 2019-09-28 DIAGNOSIS — K58.9 IRRITABLE BOWEL SYNDROME: Chronic | ICD-10-CM

## 2019-09-28 DIAGNOSIS — K20.90 ESOPHAGITIS: ICD-10-CM

## 2019-09-28 DIAGNOSIS — L03.019 INFECTION OF FINGERNAIL: Primary | ICD-10-CM

## 2019-09-28 DIAGNOSIS — R45.89 SUICIDAL BEHAVIOR WITHOUT ATTEMPTED SELF-INJURY: ICD-10-CM

## 2019-09-28 DIAGNOSIS — M79.7 FIBROMYALGIA: ICD-10-CM

## 2019-09-28 DIAGNOSIS — Z79.899 MEDICATION MANAGEMENT: ICD-10-CM

## 2019-09-28 DIAGNOSIS — K21.9 GERD (GASTROESOPHAGEAL REFLUX DISEASE): ICD-10-CM

## 2019-09-28 DIAGNOSIS — F33.1 MODERATE EPISODE OF RECURRENT MAJOR DEPRESSIVE DISORDER (HCC): ICD-10-CM

## 2019-09-28 LAB
ALBUMIN SERPL BCP-MCNC: 3.4 G/DL (ref 3.5–5)
ALP SERPL-CCNC: 71 U/L (ref 46–116)
ALT SERPL W P-5'-P-CCNC: 28 U/L (ref 12–78)
AMPHETAMINES SERPL QL SCN: NEGATIVE
ANION GAP SERPL CALCULATED.3IONS-SCNC: 9 MMOL/L (ref 4–13)
AST SERPL W P-5'-P-CCNC: 20 U/L (ref 5–45)
BACTERIA UR QL AUTO: ABNORMAL /HPF
BARBITURATES UR QL: NEGATIVE
BASOPHILS # BLD AUTO: 0.03 THOUSANDS/ΜL (ref 0–0.1)
BASOPHILS NFR BLD AUTO: 1 % (ref 0–1)
BENZODIAZ UR QL: NEGATIVE
BILIRUB SERPL-MCNC: 0.3 MG/DL (ref 0.2–1)
BILIRUB UR QL STRIP: NEGATIVE
BUN SERPL-MCNC: 14 MG/DL (ref 5–25)
CALCIUM SERPL-MCNC: 8.9 MG/DL (ref 8.3–10.1)
CHLORIDE SERPL-SCNC: 103 MMOL/L (ref 100–108)
CLARITY UR: CLEAR
CO2 SERPL-SCNC: 27 MMOL/L (ref 21–32)
COCAINE UR QL: NEGATIVE
COLOR UR: ABNORMAL
CREAT SERPL-MCNC: 1.03 MG/DL (ref 0.6–1.3)
EOSINOPHIL # BLD AUTO: 0.3 THOUSAND/ΜL (ref 0–0.61)
EOSINOPHIL NFR BLD AUTO: 5 % (ref 0–6)
ERYTHROCYTE [DISTWIDTH] IN BLOOD BY AUTOMATED COUNT: 13.8 % (ref 11.6–15.1)
ETHANOL EXG-MCNC: 0 MG/DL
GFR SERPL CREATININE-BSD FRML MDRD: 58 ML/MIN/1.73SQ M
GLUCOSE SERPL-MCNC: 119 MG/DL (ref 65–140)
GLUCOSE UR STRIP-MCNC: NEGATIVE MG/DL
HCT VFR BLD AUTO: 37.8 % (ref 34.8–46.1)
HGB BLD-MCNC: 12.1 G/DL (ref 11.5–15.4)
HGB UR QL STRIP.AUTO: ABNORMAL
IMM GRANULOCYTES # BLD AUTO: 0.02 THOUSAND/UL (ref 0–0.2)
IMM GRANULOCYTES NFR BLD AUTO: 0 % (ref 0–2)
KETONES UR STRIP-MCNC: NEGATIVE MG/DL
LACTATE SERPL-SCNC: 1.8 MMOL/L (ref 0.5–2)
LACTATE SERPL-SCNC: 2.2 MMOL/L (ref 0.5–2)
LEUKOCYTE ESTERASE UR QL STRIP: ABNORMAL
LYMPHOCYTES # BLD AUTO: 1.08 THOUSANDS/ΜL (ref 0.6–4.47)
LYMPHOCYTES NFR BLD AUTO: 19 % (ref 14–44)
MCH RBC QN AUTO: 27.7 PG (ref 26.8–34.3)
MCHC RBC AUTO-ENTMCNC: 32 G/DL (ref 31.4–37.4)
MCV RBC AUTO: 87 FL (ref 82–98)
METHADONE UR QL: NEGATIVE
MONOCYTES # BLD AUTO: 0.53 THOUSAND/ΜL (ref 0.17–1.22)
MONOCYTES NFR BLD AUTO: 9 % (ref 4–12)
NEUTROPHILS # BLD AUTO: 3.72 THOUSANDS/ΜL (ref 1.85–7.62)
NEUTS SEG NFR BLD AUTO: 66 % (ref 43–75)
NITRITE UR QL STRIP: NEGATIVE
NON-SQ EPI CELLS URNS QL MICRO: ABNORMAL /HPF
NRBC BLD AUTO-RTO: 0 /100 WBCS
OPIATES UR QL SCN: POSITIVE
PCP UR QL: NEGATIVE
PH UR STRIP.AUTO: 7 [PH]
PLATELET # BLD AUTO: 183 THOUSANDS/UL (ref 149–390)
PMV BLD AUTO: 10.2 FL (ref 8.9–12.7)
POTASSIUM SERPL-SCNC: 4.1 MMOL/L (ref 3.5–5.3)
PROT SERPL-MCNC: 7 G/DL (ref 6.4–8.2)
PROT UR STRIP-MCNC: NEGATIVE MG/DL
RBC # BLD AUTO: 4.37 MILLION/UL (ref 3.81–5.12)
RBC #/AREA URNS AUTO: ABNORMAL /HPF
SODIUM SERPL-SCNC: 139 MMOL/L (ref 136–145)
SP GR UR STRIP.AUTO: <=1.005 (ref 1–1.03)
THC UR QL: NEGATIVE
TSH SERPL DL<=0.05 MIU/L-ACNC: 2.58 UIU/ML (ref 0.36–3.74)
UROBILINOGEN UR QL STRIP.AUTO: 0.2 E.U./DL
WBC # BLD AUTO: 5.68 THOUSAND/UL (ref 4.31–10.16)
WBC #/AREA URNS AUTO: ABNORMAL /HPF

## 2019-09-28 PROCEDURE — 80307 DRUG TEST PRSMV CHEM ANLYZR: CPT | Performed by: EMERGENCY MEDICINE

## 2019-09-28 PROCEDURE — 96374 THER/PROPH/DIAG INJ IV PUSH: CPT

## 2019-09-28 PROCEDURE — 99285 EMERGENCY DEPT VISIT HI MDM: CPT

## 2019-09-28 PROCEDURE — 93005 ELECTROCARDIOGRAM TRACING: CPT

## 2019-09-28 PROCEDURE — 80053 COMPREHEN METABOLIC PANEL: CPT | Performed by: EMERGENCY MEDICINE

## 2019-09-28 PROCEDURE — 96375 TX/PRO/DX INJ NEW DRUG ADDON: CPT

## 2019-09-28 PROCEDURE — 84443 ASSAY THYROID STIM HORMONE: CPT | Performed by: EMERGENCY MEDICINE

## 2019-09-28 PROCEDURE — 82075 ASSAY OF BREATH ETHANOL: CPT | Performed by: EMERGENCY MEDICINE

## 2019-09-28 PROCEDURE — 99285 EMERGENCY DEPT VISIT HI MDM: CPT | Performed by: EMERGENCY MEDICINE

## 2019-09-28 PROCEDURE — 96361 HYDRATE IV INFUSION ADD-ON: CPT

## 2019-09-28 PROCEDURE — 74174 CTA ABD&PLVS W/CONTRAST: CPT

## 2019-09-28 PROCEDURE — 36415 COLL VENOUS BLD VENIPUNCTURE: CPT | Performed by: EMERGENCY MEDICINE

## 2019-09-28 PROCEDURE — 85025 COMPLETE CBC W/AUTO DIFF WBC: CPT | Performed by: EMERGENCY MEDICINE

## 2019-09-28 PROCEDURE — 81001 URINALYSIS AUTO W/SCOPE: CPT | Performed by: EMERGENCY MEDICINE

## 2019-09-28 PROCEDURE — 83605 ASSAY OF LACTIC ACID: CPT | Performed by: EMERGENCY MEDICINE

## 2019-09-28 RX ORDER — HALOPERIDOL 5 MG/ML
2 INJECTION INTRAMUSCULAR EVERY 6 HOURS PRN
Status: DISCONTINUED | OUTPATIENT
Start: 2019-09-28 | End: 2019-10-04 | Stop reason: HOSPADM

## 2019-09-28 RX ORDER — HYDROXYZINE HYDROCHLORIDE 25 MG/1
25 TABLET, FILM COATED ORAL EVERY 6 HOURS PRN
Status: CANCELLED | OUTPATIENT
Start: 2019-09-28

## 2019-09-28 RX ORDER — HYDROXYZINE HYDROCHLORIDE 25 MG/1
25 TABLET, FILM COATED ORAL EVERY 6 HOURS PRN
Status: DISCONTINUED | OUTPATIENT
Start: 2019-09-28 | End: 2019-10-04 | Stop reason: HOSPADM

## 2019-09-28 RX ORDER — TRAZODONE HYDROCHLORIDE 50 MG/1
50 TABLET ORAL
Status: DISCONTINUED | OUTPATIENT
Start: 2019-09-28 | End: 2019-09-28 | Stop reason: HOSPADM

## 2019-09-28 RX ORDER — BENZTROPINE MESYLATE 1 MG/ML
1 INJECTION INTRAMUSCULAR; INTRAVENOUS EVERY 8 HOURS PRN
Status: DISCONTINUED | OUTPATIENT
Start: 2019-09-28 | End: 2019-10-04 | Stop reason: HOSPADM

## 2019-09-28 RX ORDER — HALOPERIDOL 0.5 MG/1
1 TABLET ORAL EVERY 6 HOURS PRN
Status: DISCONTINUED | OUTPATIENT
Start: 2019-09-28 | End: 2019-10-04 | Stop reason: HOSPADM

## 2019-09-28 RX ORDER — MORPHINE SULFATE 4 MG/ML
4 INJECTION, SOLUTION INTRAMUSCULAR; INTRAVENOUS ONCE
Status: DISCONTINUED | OUTPATIENT
Start: 2019-09-28 | End: 2019-09-28

## 2019-09-28 RX ORDER — ACETAMINOPHEN 325 MG/1
325 TABLET ORAL EVERY 6 HOURS PRN
Status: DISCONTINUED | OUTPATIENT
Start: 2019-09-28 | End: 2019-09-29

## 2019-09-28 RX ORDER — BENZTROPINE MESYLATE 0.5 MG/1
1 TABLET ORAL EVERY 8 HOURS PRN
Status: CANCELLED | OUTPATIENT
Start: 2019-09-28

## 2019-09-28 RX ORDER — DULOXETIN HYDROCHLORIDE 60 MG/1
60 CAPSULE, DELAYED RELEASE ORAL DAILY
Status: DISCONTINUED | OUTPATIENT
Start: 2019-09-29 | End: 2019-09-28 | Stop reason: HOSPADM

## 2019-09-28 RX ORDER — ONDANSETRON 2 MG/ML
4 INJECTION INTRAMUSCULAR; INTRAVENOUS ONCE
Status: COMPLETED | OUTPATIENT
Start: 2019-09-28 | End: 2019-09-28

## 2019-09-28 RX ORDER — MORPHINE SULFATE 4 MG/ML
4 INJECTION, SOLUTION INTRAMUSCULAR; INTRAVENOUS ONCE
Status: COMPLETED | OUTPATIENT
Start: 2019-09-28 | End: 2019-09-28

## 2019-09-28 RX ORDER — BENZTROPINE MESYLATE 1 MG/ML
1 INJECTION INTRAMUSCULAR; INTRAVENOUS EVERY 8 HOURS PRN
Status: CANCELLED | OUTPATIENT
Start: 2019-09-28

## 2019-09-28 RX ORDER — MAGNESIUM HYDROXIDE/ALUMINUM HYDROXICE/SIMETHICONE 120; 1200; 1200 MG/30ML; MG/30ML; MG/30ML
15 SUSPENSION ORAL EVERY 4 HOURS PRN
Status: DISCONTINUED | OUTPATIENT
Start: 2019-09-28 | End: 2019-10-04 | Stop reason: HOSPADM

## 2019-09-28 RX ORDER — MAGNESIUM HYDROXIDE/ALUMINUM HYDROXICE/SIMETHICONE 120; 1200; 1200 MG/30ML; MG/30ML; MG/30ML
15 SUSPENSION ORAL EVERY 4 HOURS PRN
Status: CANCELLED | OUTPATIENT
Start: 2019-09-28

## 2019-09-28 RX ORDER — B-COMPLEX WITH VITAMIN C
1 TABLET ORAL
Status: DISCONTINUED | OUTPATIENT
Start: 2019-09-29 | End: 2019-09-28 | Stop reason: HOSPADM

## 2019-09-28 RX ORDER — HALOPERIDOL 1 MG/1
1 TABLET ORAL EVERY 6 HOURS PRN
Status: CANCELLED | OUTPATIENT
Start: 2019-09-28

## 2019-09-28 RX ORDER — ACETAMINOPHEN 325 MG/1
325 TABLET ORAL EVERY 6 HOURS PRN
Status: CANCELLED | OUTPATIENT
Start: 2019-09-28

## 2019-09-28 RX ORDER — RISPERIDONE 1 MG/1
1 TABLET, ORALLY DISINTEGRATING ORAL EVERY 8 HOURS PRN
Status: CANCELLED | OUTPATIENT
Start: 2019-09-28

## 2019-09-28 RX ORDER — SENNOSIDES 8.6 MG
2 TABLET ORAL
Status: DISCONTINUED | OUTPATIENT
Start: 2019-09-28 | End: 2019-09-28 | Stop reason: HOSPADM

## 2019-09-28 RX ORDER — BENZTROPINE MESYLATE 1 MG/1
1 TABLET ORAL EVERY 8 HOURS PRN
Status: DISCONTINUED | OUTPATIENT
Start: 2019-09-28 | End: 2019-10-04 | Stop reason: HOSPADM

## 2019-09-28 RX ORDER — LEVOTHYROXINE SODIUM 0.03 MG/1
25 TABLET ORAL
Status: DISCONTINUED | OUTPATIENT
Start: 2019-09-29 | End: 2019-09-28 | Stop reason: HOSPADM

## 2019-09-28 RX ORDER — PANTOPRAZOLE SODIUM 40 MG/1
40 TABLET, DELAYED RELEASE ORAL
Status: DISCONTINUED | OUTPATIENT
Start: 2019-09-29 | End: 2019-09-28 | Stop reason: HOSPADM

## 2019-09-28 RX ORDER — LANOLIN ALCOHOL/MO/W.PET/CERES
3 CREAM (GRAM) TOPICAL
Status: DISCONTINUED | OUTPATIENT
Start: 2019-09-28 | End: 2019-09-28 | Stop reason: HOSPADM

## 2019-09-28 RX ORDER — RISPERIDONE 1 MG/1
1 TABLET, ORALLY DISINTEGRATING ORAL EVERY 8 HOURS PRN
Status: DISCONTINUED | OUTPATIENT
Start: 2019-09-28 | End: 2019-10-04 | Stop reason: HOSPADM

## 2019-09-28 RX ORDER — GABAPENTIN 300 MG/1
300 CAPSULE ORAL DAILY
Status: DISCONTINUED | OUTPATIENT
Start: 2019-09-29 | End: 2019-09-28 | Stop reason: HOSPADM

## 2019-09-28 RX ORDER — HALOPERIDOL 5 MG/ML
2 INJECTION INTRAMUSCULAR EVERY 6 HOURS PRN
Status: CANCELLED | OUTPATIENT
Start: 2019-09-28

## 2019-09-28 RX ORDER — GABAPENTIN 300 MG/1
600 CAPSULE ORAL
Status: DISCONTINUED | OUTPATIENT
Start: 2019-09-28 | End: 2019-09-28 | Stop reason: HOSPADM

## 2019-09-28 RX ADMIN — MELATONIN 3 MG: at 21:09

## 2019-09-28 RX ADMIN — ONDANSETRON 4 MG: 2 INJECTION INTRAMUSCULAR; INTRAVENOUS at 13:00

## 2019-09-28 RX ADMIN — GABAPENTIN 600 MG: 300 CAPSULE ORAL at 21:09

## 2019-09-28 RX ADMIN — STANDARDIZED SENNA CONCENTRATE 17.2 MG: 8.6 TABLET ORAL at 21:08

## 2019-09-28 RX ADMIN — IODIXANOL 100 ML: 320 INJECTION, SOLUTION INTRAVASCULAR at 14:22

## 2019-09-28 RX ADMIN — MORPHINE SULFATE 4 MG: 4 INJECTION INTRAVENOUS at 13:46

## 2019-09-28 RX ADMIN — SODIUM CHLORIDE 1000 ML: 0.9 INJECTION, SOLUTION INTRAVENOUS at 13:15

## 2019-09-28 NOTE — ED NOTES
Patient changed into paper scrubs and belongings collected  Accompanied back to room 21 by this RN        Aleksander Claire RN  09/28/19 8084

## 2019-09-28 NOTE — ED NOTES
Pt is a 62 y o  female who was brought to the ED with ongoing abdominal pain for several days  Patient relates that her pain has been so significant that she has been feeling hopeless as there have been no indication medically what is causing her to feel as horribly as she does  Patient expressed that over the past year she has been diagnosed with medical conditions that have caused her to retire early and limited her mobility for awhile  She believes that this made her lose sight of herself as she was always the caretaker and independent  Patient identified that she really enjoyed her career and has been depressed since having to leave that position  Patient has been working part-time and enjoys what she is doing, however still feels depressed with her overall situation  Patient also expressed that since the summer she started to recognize that she didn't have many supports in place and started to feel withdrawn and alone; she relates feeling a shift in her personality in which she wants to retreat into a ball alone rather than attempt to be social  Patient was very tearful throughout encounter, especially when discussing how different things have been with her over the past several months  Patient denies any prior mental health treatment history prior to her last hospitalization at [facility]  She states that she has an initial appointment with a psychiatrist sometime in November  Patient has not been able to schedule with a therapist, despite making calls for an initial appointment  Patient states she contacted a  through her insurance to assist with locating treatment in her area  Patient relates that she feels like her brain is distorting the reality of what's actually going on; she expressed understanding that she is being medically cleared, however still believes that something more is wrong or her brain is making her believe it is   Patient reports ongoing suicidal ideations, without a plan, relating that she would be better off dead than dealing with this pain  Patient does not believe that she would hurt herself, however is worried that she would not be safe being home because it's just getting worse and she isn't sure how distorted her brain is making things  Patient denies homicidal ideations and auditory/visual hallucinations  Patient does report worsening racing thoughts and fluctuating sleep  Patient would like to sign back in for treatment at this time  Chief Complaint   Patient presents with    Abdominal Pain     Patient c/o worsening abdominal pain that started about an hour ago  Was seen in ED yesterday and told "I had a lot of stool in my colon " Nausea present        Intake Assessment completed, Safety risk Assessment completed    ALEXANDRIA Ferreira  09/28/19   2320

## 2019-09-28 NOTE — ED NOTES
Delivered dinner tray to patient  Patient awake and eating dinner tray  Lights are off and tv on in room  Will continue to monitor  1;1 sitter is present       Jose Pascual  09/28/19 Scotty Gupta  09/28/19 0814

## 2019-09-28 NOTE — ED NOTES
Haroldo Florence PA-C aware of patient's c/o pain  No new orders        Rosemary Bucio, JIE  09/28/19 1274

## 2019-09-28 NOTE — ED ATTENDING ATTESTATION
9/28/2019  IAngella MD, saw and evaluated the patient  I have discussed the patient with the resident/non-physician practitioner and agree with the resident's/non-physician practitioner's findings, Plan of Care, and MDM as documented in the resident's/non-physician practitioner's note, except where noted  All available labs and Radiology studies were reviewed  I was present for key portions of any procedure(s) performed by the resident/non-physician practitioner and I was immediately available to provide assistance  At this point I agree with the current assessment done in the Emergency Department  I have conducted an independent evaluation of this patient a history and physical is as follows:    78-year-old female presenting for evaluation of severe abdominal pain  She was seen in the emergency department yesterday for the same, had a CT scan with no acute abnormalities and unremarkable labs  States her pain has since gotten much worse, feels like a bowel obstruction she had in the past   She did have a bowel movement this morning and is nauseous but not vomiting  Labs unremarkable with the exception of lactic acidosis to 2 2, cleared on repeat  On exam patient has pain out of proportion, abdomen is soft but she screams in pain when palpating her lower abdomen  She is not peritoneal and does not have rigidity  Vital signs within normal limits, normal heart and lung sounds  Plan to obtain CTA of the abdomen and pelvis with and without contrast to evaluate for mesenteric ischemia or developing bowel obstruction  CT scan unremarkable  At this point she has had an extensive workup and including 2 CT scans and unremarkable labs  Her abdomen remains soft without guarding or rigidity  Discharge home was discussed with the patient, at which point she stated that she was suicidal and wanted to kill herself because of her pain    She reports feelings of hopelessness and depression, and requests inpatient psychiatric treatment  She has agreed to sign 201 for voluntary commitment       ED Course         Critical Care Time  Procedures

## 2019-09-28 NOTE — DISCHARGE INSTRUCTIONS
Patient instructed that she needs to follow up with primary care physician for re-evaluation of abdominal pain  At this time there is no indication for medications including antibiotics  Return precautions discussed with patient including persistent nausea, vomiting, diarrhea or constipation for fevers or chills

## 2019-09-28 NOTE — ED NOTES
Patient signed [phone]  Will have unit clerk fax to crisis worker at this time        Aleksander Claire RN  09/28/19 8075

## 2019-09-28 NOTE — ED PROVIDER NOTES
History  Chief Complaint   Patient presents with    Abdominal Pain     Patient c/o worsening abdominal pain that started about an hour ago  Was seen in ED yesterday and told "I had a lot of stool in my colon " Nausea present  62year old female presenting for evaluation of worsening abdominal pain along with nausea  Patient states that the abdominal pain all started 1 hour ago  She describes this as periumbilical pain that is a 10/10  Patient has been experiencing significant nausea without vomiting  She was previously evaluated in ED yesterday with extensive workup demonstrating no acute intra abdominal process and significant constipation  After patient was completely medically cleared she stated that due to her chronic pain if she was discharged she was going to go home and kill herself  She has extensive past psych history including a recent admission to [address] [place] [address] for 12 days  Consultation to crisis was placed  Prior to Admission Medications   Prescriptions Last Dose Informant Patient Reported? Taking?    ALPRAZolam (XANAX) 0 5 mg tablet   Yes Yes   DULoxetine (CYMBALTA) 30 mg delayed release capsule   Yes Yes   Sig: Take 60 mg by mouth daily    ESOMEPRAZOLE MAGNESIUM PO   Yes No   calcium carbonate-vitamin D (OSCAL-D) 500 mg-200 units per tablet   No Yes   Sig: Take 1 tablet by mouth daily with breakfast   esomeprazole (NexIUM) 40 MG capsule   Yes Yes   Sig: Take 40 mg by mouth every morning before breakfast   gabapentin (NEURONTIN) 300 mg capsule   No Yes   Sig: Take 2 capsules (600 mg total) by mouth daily at bedtime   gabapentin (NEURONTIN) 300 mg capsule   No Yes   Sig: Take 1 capsule (300 mg total) by mouth daily   levothyroxine 25 mcg tablet   No Yes   Sig: Take 1 tablet (25 mcg total) by mouth daily in the early morning   melatonin 3 mg   No Yes   Sig: Take 1 tablet (3 mg total) by mouth daily at bedtime   senna (SENOKOT) 8 6 mg   No Yes   Sig: Take 2 tablets (17 2 mg total) by mouth daily at bedtime   traZODone (DESYREL) 50 mg tablet   No Yes   Sig: Take 1 tablet (50 mg total) by mouth daily at bedtime as needed (insomnia)      Facility-Administered Medications: None       Past Medical History:   Diagnosis Date    Abdominal adhesions     Anesthesia complication     difficult to wake up    Depression     Disease of thyroid gland     Fibromyalgia     GERD (gastroesophageal reflux disease)     Lazy eye     resolved: 3/27/17    Osteopenia     with joint pain-elevated DEV    Psychiatric disorder     Tinnitus     Wears glasses     for reading       Past Surgical History:   Procedure Laterality Date    ABDOMINAL SURGERY      lysis of adhesions x 2    APPENDECTOMY      CERVICAL FUSION      CHOLECYSTECTOMY      open    DILATION AND CURETTAGE OF UTERUS      NEUROMA EXCISION Right 5/26/2017    Procedure: EXCISION MASS / FIBROMA FOOT;  Surgeon: Courtney Kang DPM;  Location: [facility];  Service:     RIGHT OOPHORECTOMY      WISDOM TOOTH EXTRACTION      x4       Family History   Problem Relation Age of Onset    Heart disease Mother     Stroke Mother 58    COPD Mother     Arthritis Mother     Hypertension Father     Kidney disease Brother         kidney transplant    No Known Problems Sister     No Known Problems Maternal Aunt     No Known Problems Maternal Uncle     No Known Problems Paternal Aunt     No Known Problems Paternal Uncle     No Known Problems Maternal Grandmother     No Known Problems Maternal Grandfather     No Known Problems Paternal Grandmother     No Known Problems Paternal Grandfather     ADD / ADHD Neg Hx     Anesthesia problems Neg Hx     Cancer Neg Hx     Clotting disorder Neg Hx     Collagen disease Neg Hx     Diabetes Neg Hx     Dislocations Neg Hx     Learning disabilities Neg Hx     Neurological problems Neg Hx     Osteoporosis Neg Hx     Rheumatologic disease Neg Hx     Scoliosis Neg Hx     Vascular Disease Neg Hx      I have reviewed and agree with the history as documented  Social History     Tobacco Use    Smoking status: Never Smoker    Smokeless tobacco: Never Used   Substance Use Topics    Alcohol use: Not Currently     Frequency: Never     Binge frequency: Never    Drug use: No        Review of Systems   Constitutional: Positive for activity change  Negative for chills, fatigue and fever  HENT: Negative for congestion  Respiratory: Negative for cough, shortness of breath and wheezing  Cardiovascular: Negative for chest pain, palpitations and leg swelling  Gastrointestinal: Positive for abdominal pain and nausea  Negative for diarrhea and vomiting  Genitourinary: Negative for flank pain  Musculoskeletal: Positive for back pain and neck pain  Negative for neck stiffness  Skin: Negative for color change  Allergic/Immunologic: Negative for immunocompromised state  Neurological: Negative for dizziness, weakness, light-headedness and numbness  Psychiatric/Behavioral: Negative for confusion  Physical Exam  Physical Exam   Constitutional: She is oriented to person, place, and time  She appears well-developed and well-nourished  Non-toxic appearance  She does not appear ill  She appears distressed  HENT:   Head: Normocephalic and atraumatic  Eyes: Pupils are equal, round, and reactive to light  Cardiovascular: Normal rate, regular rhythm and normal heart sounds  Pulmonary/Chest: Effort normal and breath sounds normal  No respiratory distress  Abdominal: She exhibits no distension  There is generalized tenderness  There is no rigidity, no guarding, no tenderness at McBurney's point and negative Krause's sign  Soft with active bowel sounds no guarding or peritoneal signs  Patient jumping off of bed out of proportion to exam making it difficult to perform thorough abdominal exam   Neurological: She is alert and oriented to person, place, and time  Skin: Skin is warm and dry  Psychiatric:   Patient stated that she is actively suicidal with plan    Nursing note and vitals reviewed  Vital Signs  ED Triage Vitals   Temperature Pulse Respirations Blood Pressure SpO2   09/28/19 1204 09/28/19 1203 09/28/19 1203 09/28/19 1203 09/28/19 1203   97 7 °F (36 5 °C) 86 18 145/87 96 %      Temp Source Heart Rate Source Patient Position - Orthostatic VS BP Location FiO2 (%)   09/28/19 1203 09/28/19 1203 09/28/19 1203 09/28/19 1203 --   Oral Monitor Lying Right arm       Pain Score       09/28/19 1346       9           Vitals:    09/28/19 1203 09/28/19 1321 09/28/19 1600   BP: 145/87 104/60 153/79   Pulse: 86 75 78   Patient Position - Orthostatic VS: Lying Lying          Visual Acuity      ED Medications  Medications   ondansetron (ZOFRAN) injection 4 mg (4 mg Intravenous Given 9/28/19 1300)   morphine (PF) 4 mg/mL injection 4 mg (4 mg Intravenous Given 9/28/19 1346)   sodium chloride 0 9 % bolus 1,000 mL (0 mL Intravenous Stopped 9/28/19 1445)   iodixanol (VISIPAQUE) 320 MG/ML injection 100 mL (100 mL Intravenous Given 9/28/19 1422)       Diagnostic Studies  Results Reviewed     Procedure Component Value Units Date/Time    Urine Microscopic [[id number]]  (Abnormal) Collected:  09/28/19 1627    Lab Status:  Final result Specimen:  Urine Updated:  09/28/19 1709     RBC, UA None Seen /hpf      WBC, UA 2-4 /hpf      Epithelial Cells Occasional /hpf      Bacteria, UA Moderate /hpf     TSH [[id number]]  (Normal) Collected:  09/28/19 1239    Lab Status:  Final result Specimen:  Blood Updated:  09/28/19 1709     TSH 3RD GENERATON 2 575 uIU/mL     Narrative:       Patients undergoing fluorescein dye angiography may retain small amounts of fluorescein in the body for 48-72 hours post procedure  Samples containing fluorescein can produce falsely depressed TSH values  If the patient had this procedure,a specimen should be resubmitted post fluorescein clearance        UA w Reflex to Microscopic w Reflex to Culture [[id number]]  (Abnormal) Collected:  09/28/19 1627    Lab Status:  Final result Specimen:  Urine Updated:  09/28/19 1646     Color, UA Light Yellow     Clarity, UA Clear     Specific Gravity, UA <=1 005     pH, UA 7 0     Leukocytes, UA Moderate     Nitrite, UA Negative     Protein, UA Negative mg/dl      Glucose, UA Negative mg/dl      Ketones, UA Negative mg/dl      Urobilinogen, UA 0 2 E U /dl      Bilirubin, UA Negative     Blood, UA Trace-Intact    Rapid drug screen, urine [[id number]]  (Abnormal) Collected:  09/28/19 1627    Lab Status:  Final result Specimen:  Urine, Clean Catch Updated:  09/28/19 1646     Amph/Meth UR Negative     Barbiturate Ur Negative     Benzodiazepine Urine Negative     Cocaine Urine Negative     Methadone Urine Negative     Opiate Urine Positive     PCP Ur Negative     THC Urine Negative    Narrative:       Presumptive report  If requested, specimen will be sent to reference lab for confirmation  FOR MEDICAL PURPOSES ONLY  IF CONFIRMATION NEEDED PLEASE CONTACT THE LAB WITHIN 5 DAYS  Drug Screen Cutoff Levels:  AMPHETAMINE/METHAMPHETAMINES  1000 ng/mL  BARBITURATES     200 ng/mL  BENZODIAZEPINES     200 ng/mL  COCAINE      300 ng/mL  METHADONE      300 ng/mL  OPIATES      300 ng/mL  PHENCYCLIDINE     25 ng/mL  THC       50 ng/mL      POCT alcohol breath test [[id number]]  (Normal) Resulted:  09/28/19 1629    Lab Status:  Final result Updated:  09/28/19 1629     EXTBreath Alcohol 0 000    Lactic acid, plasma [[id number]]  (Normal) Collected:  09/28/19 1458    Lab Status:  Final result Specimen:  Blood from Arm, Left Updated:  09/28/19 1521     LACTIC ACID 1 8 mmol/L     Narrative:       Result may be elevated if tourniquet was used during collection      Comprehensive metabolic panel [[id number]]  (Abnormal) Collected:  09/28/19 1239    Lab Status:  Final result Specimen:  Blood from Arm, Left Updated:  09/28/19 1313     Sodium 139 mmol/L      Potassium 4 1 mmol/L      Chloride 103 mmol/L      CO2 27 mmol/L      ANION GAP 9 mmol/L      BUN 14 mg/dL      Creatinine 1 03 mg/dL      Glucose 119 mg/dL      Calcium 8 9 mg/dL      AST 20 U/L      ALT 28 U/L      Alkaline Phosphatase 71 U/L      Total Protein 7 0 g/dL      Albumin 3 4 g/dL      Total Bilirubin 0 30 mg/dL      eGFR 58 ml/min/1 73sq m     Narrative:       [place] guidelines for Chronic Kidney Disease (CKD):     Stage 1 with normal or high GFR (GFR > 90 mL/min/1 73 square meters)    Stage 2 Mild CKD (GFR = 60-89 mL/min/1 73 square meters)    Stage 3A Moderate CKD (GFR = 45-59 mL/min/1 73 square meters)    Stage 3B Moderate CKD (GFR = 30-44 mL/min/1 73 square meters)    Stage 4 Severe CKD (GFR = 15-29 mL/min/1 73 square meters)    Stage 5 End Stage CKD (GFR <15 mL/min/1 73 square meters)  Note: GFR calculation is accurate only with a steady state creatinine    Lactic acid, plasma [[id number]]  (Abnormal) Collected:  09/28/19 1239    Lab Status:  Final result Specimen:  Blood from Arm, Left Updated:  09/28/19 1308     LACTIC ACID 2 2 mmol/L     Narrative:       Result may be elevated if tourniquet was used during collection      CBC and differential [[id number]] Collected:  09/28/19 1239    Lab Status:  Final result Specimen:  Blood from Arm, Left Updated:  09/28/19 1246     WBC 5 68 Thousand/uL      RBC 4 37 Million/uL      Hemoglobin 12 1 g/dL      Hematocrit 37 8 %      MCV 87 fL      MCH 27 7 pg      MCHC 32 0 g/dL      RDW 13 8 %      MPV 10 2 fL      Platelets 598 Thousands/uL      nRBC 0 /100 WBCs      Neutrophils Relative 66 %      Immat GRANS % 0 %      Lymphocytes Relative 19 %      Monocytes Relative 9 %      Eosinophils Relative 5 %      Basophils Relative 1 %      Neutrophils Absolute 3 72 Thousands/µL      Immature Grans Absolute 0 02 Thousand/uL      Lymphocytes Absolute 1 08 Thousands/µL      Monocytes Absolute 0 53 Thousand/µL      Eosinophils Absolute 0 30 Thousand/µL      Basophils Absolute 0 03 Thousands/µL                  CTA abdomen pelvis w wo contrast   Final Result by Get Tinajero MD (09/28 6265)      No evidence of aneurysm or dissection  Abdominal aorta and branch vessels are patent  Workstation performed: [id number]                    Procedures  Procedures       ED Course  ED Course as of Sep 28 1856   Sat Sep 28, 2019   1309 LACTIC ACID(!!): 2 2   1542 Patient states that due to her chronic pain including spinal stenosis, lumbar injury she is having significant thoughts of suicidal ideation and states that if she is discharge she is going to go home and kill herself  MDM  Number of Diagnoses or Management Options  Generalized abdominal pain: new and requires workup  Suicidal behavior without attempted self-injury: established and worsening  Diagnosis management comments: 62year old female initially presenting for evaluation of abdominal pain  Patient recently evaluated here in due to pain out of proportion with exam concern for mesenteric ischemia  Patient has known past psychiatric history and upon discharge patient states that she is going to go home and kill herself  Patient at this point transferred to psychiatric room and crisis alerted         Amount and/or Complexity of Data Reviewed  Clinical lab tests: ordered and reviewed  Tests in the radiology section of CPT®: ordered and reviewed  Tests in the medicine section of CPT®: ordered and reviewed  Obtain history from someone other than the patient: yes  Review and summarize past medical records: yes  Discuss the patient with other providers: yes  Independent visualization of images, tracings, or specimens: yes    Risk of Complications, Morbidity, and/or Mortality  Presenting problems: high  Diagnostic procedures: high  Management options: high    Patient Progress  Patient progress: stable      Disposition  Final diagnoses:   Generalized abdominal pain   Suicidal behavior without attempted self-injury     Time reflects when diagnosis was documented in both MDM as applicable and the Disposition within this note     Time User Action Codes Description Comment    9/28/2019  3:24 PM Florina Miller Add [R10 84] Generalized abdominal pain     9/28/2019  6:55 PM Margarita Hutchinsonkathya DASH Add [R46 89] Suicidal behavior without attempted self-injury       ED Disposition     ED Disposition Condition Date/Time Comment    Discharge Stable Sat Sep 28, 2019  3:24 PM Nixon Farias discharge to home/self care  MD Documentation      Most Recent Value   Sending MD Dr Rome Breath up With Specialties Details Why Contact Info Additional [address] [place] MD Internal Medicine, Family Medicine  Re-evaluation of abdominal pain [address] [facility] [address]  [facility] Emergency Department Emergency Medicine  If symptoms worsen [address] [id number] [phone] [facility] ED,  [address], [place], [address]          Patient's Medications   Discharge Prescriptions    No medications on file     No discharge procedures on file      ED Provider  Electronically Signed by           Abigail James PA-C  09/28/19 6202

## 2019-09-28 NOTE — ED NOTES
Pt reports she is very upset and traumatized at the thought of dealing with all of her conditions, reporting SI, thoughts of going home and by the very thought of enduring all the pain and discomfort states that she will kill herself        Callie Das RN  09/28/19 6648

## 2019-09-28 NOTE — ED NOTES
201 faxed to [facility] for review at [facility]       Rafal Pulliam, ALEXANDRIA  09/28/19   0699

## 2019-09-28 NOTE — ED NOTES
Ordered dinner tray for patient  Patient is resting comfortably with lights off and tv on in room  1'1 sitter is present  Will continue to monitor        Claudia Schultz  09/28/19 9392

## 2019-09-29 LAB
ATRIAL RATE: 73 BPM
P AXIS: 72 DEGREES
PR INTERVAL: 166 MS
QRS AXIS: -13 DEGREES
QRSD INTERVAL: 80 MS
QT INTERVAL: 398 MS
QTC INTERVAL: 438 MS
T WAVE AXIS: 36 DEGREES
VENTRICULAR RATE: 73 BPM

## 2019-09-29 PROCEDURE — 99254 IP/OBS CNSLTJ NEW/EST MOD 60: CPT | Performed by: PHYSICIAN ASSISTANT

## 2019-09-29 PROCEDURE — 93010 ELECTROCARDIOGRAM REPORT: CPT | Performed by: INTERNAL MEDICINE

## 2019-09-29 RX ORDER — GABAPENTIN 300 MG/1
600 CAPSULE ORAL
Status: DISCONTINUED | OUTPATIENT
Start: 2019-09-29 | End: 2019-10-04 | Stop reason: HOSPADM

## 2019-09-29 RX ORDER — POLYETHYLENE GLYCOL 3350 17 G/17G
17 POWDER, FOR SOLUTION ORAL DAILY
Status: DISCONTINUED | OUTPATIENT
Start: 2019-09-29 | End: 2019-10-03

## 2019-09-29 RX ORDER — LIDOCAINE 50 MG/G
1 PATCH TOPICAL DAILY PRN
Status: DISCONTINUED | OUTPATIENT
Start: 2019-09-29 | End: 2019-10-04 | Stop reason: HOSPADM

## 2019-09-29 RX ORDER — AMOXICILLIN 250 MG
2 CAPSULE ORAL
Status: DISCONTINUED | OUTPATIENT
Start: 2019-09-29 | End: 2019-10-04 | Stop reason: HOSPADM

## 2019-09-29 RX ORDER — B-COMPLEX WITH VITAMIN C
1 TABLET ORAL
Status: DISCONTINUED | OUTPATIENT
Start: 2019-09-30 | End: 2019-10-04 | Stop reason: HOSPADM

## 2019-09-29 RX ORDER — ACETAMINOPHEN 325 MG/1
975 TABLET ORAL EVERY 8 HOURS SCHEDULED
Status: DISCONTINUED | OUTPATIENT
Start: 2019-09-29 | End: 2019-10-03

## 2019-09-29 RX ORDER — PANTOPRAZOLE SODIUM 40 MG/1
40 TABLET, DELAYED RELEASE ORAL
Status: DISCONTINUED | OUTPATIENT
Start: 2019-09-30 | End: 2019-10-04 | Stop reason: HOSPADM

## 2019-09-29 RX ORDER — ARIPIPRAZOLE 2 MG/1
2 TABLET ORAL DAILY
Status: DISCONTINUED | OUTPATIENT
Start: 2019-09-29 | End: 2019-10-04 | Stop reason: HOSPADM

## 2019-09-29 RX ORDER — MUSCLE RUB CREAM 100; 150 MG/G; MG/G
1 CREAM TOPICAL 4 TIMES DAILY PRN
Status: DISCONTINUED | OUTPATIENT
Start: 2019-09-29 | End: 2019-10-04 | Stop reason: HOSPADM

## 2019-09-29 RX ORDER — DULOXETIN HYDROCHLORIDE 60 MG/1
60 CAPSULE, DELAYED RELEASE ORAL DAILY
Status: DISCONTINUED | OUTPATIENT
Start: 2019-09-29 | End: 2019-10-04 | Stop reason: HOSPADM

## 2019-09-29 RX ORDER — GABAPENTIN 300 MG/1
300 CAPSULE ORAL DAILY
Status: DISCONTINUED | OUTPATIENT
Start: 2019-09-29 | End: 2019-10-04 | Stop reason: HOSPADM

## 2019-09-29 RX ADMIN — DULOXETINE HYDROCHLORIDE 60 MG: 60 CAPSULE, DELAYED RELEASE ORAL at 09:05

## 2019-09-29 RX ADMIN — GABAPENTIN 300 MG: 300 CAPSULE ORAL at 09:05

## 2019-09-29 RX ADMIN — POLYETHYLENE GLYCOL 3350 17 G: 17 POWDER, FOR SOLUTION ORAL at 09:06

## 2019-09-29 RX ADMIN — ARIPIPRAZOLE 2 MG: 2 TABLET ORAL at 09:05

## 2019-09-29 RX ADMIN — ACETAMINOPHEN 975 MG: 325 TABLET ORAL at 15:32

## 2019-09-29 RX ADMIN — GABAPENTIN 600 MG: 300 CAPSULE ORAL at 21:46

## 2019-09-29 RX ADMIN — SENNOSIDES AND DOCUSATE SODIUM 2 TABLET: 8.6; 5 TABLET ORAL at 21:47

## 2019-09-29 RX ADMIN — ACETAMINOPHEN 975 MG: 325 TABLET ORAL at 21:47

## 2019-09-29 NOTE — PLAN OF CARE
Problem: Depression  Goal: Treatment Goal: Demonstrate behavioral control of depressive symptoms, verbalize feelings of improved mood/affect, and adopt new coping skills prior to discharge  Outcome: Not Progressing  Goal: Verbalize thoughts and feelings  Description  Interventions:  - Assess and re-assess patient's level of risk   - Engage patient in 1:1 interactions, daily, for a minimum of 15 minutes   - Encourage patient to express feelings, fears, frustrations, hopes   Outcome: Not Progressing  Goal: Refrain from harming self  Description  Interventions:  - Monitor patient closely, per order   - Supervise medication ingestion, monitor effects and side effects   Outcome: Not Progressing  Goal: Refrain from isolation  Description  Interventions:  - Develop a trusting relationship   - Encourage socialization   Outcome: Not Progressing  Goal: Refrain from self-neglect  Outcome: Not Progressing  Goal: Complete daily ADLs, including personal hygiene independently, as able  Description  Interventions:  - Observe, teach, and assist patient with ADLS  -  Monitor and promote a balance of rest/activity, with adequate nutrition and elimination   Outcome: Not Progressing     Problem: PAIN - ADULT  Goal: Verbalizes/displays adequate comfort level or baseline comfort level  Description  Interventions:  - Encourage patient to monitor pain and request assistance  - Assess pain using appropriate pain scale  - Administer analgesics based on type and severity of pain and evaluate response  - Implement non-pharmacological measures as appropriate and evaluate response  - Consider cultural and social influences on pain and pain management  - Notify physician/advanced practitioner if interventions unsuccessful or patient reports new pain  Outcome: Not Progressing

## 2019-09-29 NOTE — PLAN OF CARE
Problem: Depression  Goal: Refrain from harming self  Description  Interventions:  - Monitor patient closely, per order   - Supervise medication ingestion, monitor effects and side effects   Outcome: Progressing  Goal: Refrain from isolation  Description  Interventions:  - Develop a trusting relationship   - Encourage socialization   Outcome: Progressing     Problem: PAIN - ADULT  Goal: Verbalizes/displays adequate comfort level or baseline comfort level  Description  Interventions:  - Encourage patient to monitor pain and request assistance  - Assess pain using appropriate pain scale  - Administer analgesics based on type and severity of pain and evaluate response  - Implement non-pharmacological measures as appropriate and evaluate response  - Consider cultural and social influences on pain and pain management  - Notify physician/advanced practitioner if interventions unsuccessful or patient reports new pain  Outcome: Progressing     Problem: Depression  Goal: Treatment Goal: Demonstrate behavioral control of depressive symptoms, verbalize feelings of improved mood/affect, and adopt new coping skills prior to discharge  Outcome: Not Progressing  Goal: Verbalize thoughts and feelings  Description  Interventions:  - Assess and re-assess patient's level of risk   - Engage patient in 1:1 interactions, daily, for a minimum of 15 minutes   - Encourage patient to express feelings, fears, frustrations, hopes   Outcome: Not Progressing  Goal: Refrain from self-neglect  Outcome: Not Progressing  Goal: Complete daily ADLs, including personal hygiene independently, as able  Description  Interventions:  - Observe, teach, and assist patient with ADLS  -  Monitor and promote a balance of rest/activity, with adequate nutrition and elimination   Outcome: Not Progressing

## 2019-09-29 NOTE — NURSING NOTE
Admission note  Patient had two er admissions in two days for abdominal pain  The results are negative for any abnormalities  They wanted to discharge her and  She stated that she "cant go on living with the abdominal pain and she wanted to come here " patient was cooperative during admission  Vitals stable  Denies any thoughts of SI now and states she wouldn't do anything to hurt herself  Patient oriented to the unit  Started on q 7 minute checks

## 2019-09-29 NOTE — H&P
Initial Psychiatric Evaluation    Medical Record Number: [id number]  Encounter: [id number]      History:     Ariadna Gonsales is an 62 y o , female, admitted to the psychiatric unit under a 201 status to Dr Bessy Mann' service with the chief complaint of  increased depression and suicidal thoughts  She said that she was becoming quite preoccupied and distraught about her abdominal pain including nausea which she was not the being helped by medical treatment  She said she felt so bad because of her pain that she reconsider considered killing herself she was treated and evaluated at the medical floor at the [facility] and then because of her depression suicidal threats transferred [facility] at [facility]     Patient had recent discharge in treatment for this very condition and was actually discharged a week ago from this very unit  She was discharged on Cymbalta 60 mg and Abilify 2 mg which she said she was taking  During the interview and was letting the staff know she was not having significant significant bowel movements and felt constipated and chronically in pain  He was also having some possible a packet issues for which she was being evaluated by her spinal surgeon        Past Medical History:   Diagnosis Date    Abdominal adhesions     Anesthesia complication     difficult to wake up    Depression     Disease of thyroid gland     Fibromyalgia     GERD (gastroesophageal reflux disease)     Lazy eye     resolved: 3/27/17    Osteopenia     with joint pain-elevated DEV    Psychiatric disorder     Tinnitus     Wears glasses     for reading       Past surgical history:  Past Surgical History:   Procedure Laterality Date    ABDOMINAL SURGERY      lysis of adhesions x 2    APPENDECTOMY      CERVICAL FUSION      CHOLECYSTECTOMY      open    DILATION AND CURETTAGE OF UTERUS      NEUROMA EXCISION Right 5/26/2017    Procedure: EXCISION MASS / FIBROMA FOOT;  Surgeon: Elizabeth Owens DPM;  Location: [facility];  Service:     RIGHT OOPHORECTOMY      WISDOM TOOTH EXTRACTION      x4       Family history:  Family History   Problem Relation Age of Onset    Heart disease Mother     Stroke Mother 58    COPD Mother     Arthritis Mother     Hypertension Father     Kidney disease Brother         kidney transplant    No Known Problems Sister     No Known Problems Maternal Aunt     No Known Problems Maternal Uncle     No Known Problems Paternal Aunt     No Known Problems Paternal Uncle     No Known Problems Maternal Grandmother     No Known Problems Maternal Grandfather     No Known Problems Paternal Grandmother     No Known Problems Paternal Grandfather     ADD / ADHD Neg Hx     Anesthesia problems Neg Hx     Cancer Neg Hx     Clotting disorder Neg Hx     Collagen disease Neg Hx     Diabetes Neg Hx     Dislocations Neg Hx     Learning disabilities Neg Hx     Neurological problems Neg Hx     Osteoporosis Neg Hx     Rheumatologic disease Neg Hx     Scoliosis Neg Hx     Vascular Disease Neg Hx        Current medications:    Current Facility-Administered Medications:     acetaminophen (TYLENOL) tablet 325 mg, 325 mg, Oral, Q6H PRN, Yady Flores MD    aluminum-magnesium hydroxide-simethicone (MYLANTA) 200-200-20 mg/5 mL oral suspension 15 mL, 15 mL, Oral, Q4H PRN, Yady Flores MD    ARIPiprazole (ABILIFY) tablet 2 mg, 2 mg, Oral, Daily, Anita Agee PA-C, 2 mg at 09/29/19 0905    benztropine (COGENTIN) injection 1 mg, 1 mg, Intramuscular, Q8H PRN, Yady Flores MD    benztropine (COGENTIN) tablet 1 mg, 1 mg, Oral, Q8H PRN, Yady Flores MD    DULoxetine (CYMBALTA) delayed release capsule 60 mg, 60 mg, Oral, Daily, Anita Agee PA-C, 60 mg at 09/29/19 0905    gabapentin (NEURONTIN) capsule 300 mg, 300 mg, Oral, Daily, Anita Agee PA-C, 300 mg at 09/29/19 0905    gabapentin (NEURONTIN) capsule 600 mg, 600 mg, Oral, HS, Anita Madeleine Caballero PA-C    haloperidol (HALDOL) tablet 1 mg, 1 mg, Oral, Q6H PRN, Drew Cespedes MD    haloperidol lactate (HALDOL) injection 2 mg, 2 mg, Intramuscular, Q6H PRN, Drew Cespedes MD    hydrOXYzine HCL (ATARAX) tablet 25 mg, 25 mg, Oral, Q6H PRN, Drew Cespedes MD    magnesium hydroxide (MILK OF MAGNESIA) 400 mg/5 mL oral suspension 30 mL, 30 mL, Oral, Daily PRN, Drew Cespedes MD    polyethylene glycol [facility]) packet 17 g, 17 g, Oral, Daily, Anita Agee PA-C, 17 g at 09/29/19 5654    risperiDONE (RisperDAL M-TABS) dispersible tablet 1 mg, 1 mg, Oral, Q8H PRN, Drew Cespedes MD    senna-docusate sodium (SENOKOT S) 8 6-50 mg per tablet 2 tablet, 2 tablet, Oral, HS, Laura Winston PA-C    Psychiatric Review Of Systems:  sleep: no  appetite changes: no  weight changes: no  energy/anergy: no  interest/pleasure/anhedonia: no  somatic symptoms: no  anxiety/panic: yes  gertrude: no  guilty/hopeless: no  self injurious behavior/risky behavior: no    Past Psychiatric History:   Therapy, Out Patient outpatient mental health  Currently in treatment with outpatient mental health  Past Suicide attempts:  None known  Past Violent behavior:  None known  Past Psychiatric medication trial:  Multiple  Past Psychiatric Hospitalizations:  Multiple    Allergies:   Allergies   Allergen Reactions    Demerol [Meperidine] Lightheadedness     Reaction Date: 11Jan2006;     Sulfa Antibiotics Hives     Reaction Date: 11Jan2006;        Social History:  Social History     Socioeconomic History    Marital status: /Civil Union     Spouse name: Not on file    Number of children: Not on file    Years of education: Not on file    Highest education level: Not on file   Occupational History    Not on file   Social Needs    Financial resource strain: Not on file    Food insecurity:     Worry: Not on file     Inability: Not on file    Transportation needs:     Medical: Not on file     Non-medical: Not on file   Tobacco Use    Smoking status: Never Smoker    Smokeless tobacco: Never Used   Substance and Sexual Activity    Alcohol use: Not Currently     Frequency: Never     Binge frequency: Never    Drug use: No    Sexual activity: Not Currently   Lifestyle    Physical activity:     Days per week: Not on file     Minutes per session: Not on file    Stress: Not on file   Relationships    Social connections:     Talks on phone: Not on file     Gets together: Not on file     Attends Confucianism service: Not on file     Active member of club or organization: Not on file     Attends meetings of clubs or organizations: Not on file     Relationship status: Not on file    Intimate partner violence:     Fear of current or ex partner: Not on file     Emotionally abused: Not on file     Physically abused: Not on file     Forced sexual activity: Not on file   Other Topics Concern    Not on file   Social History Narrative    Not on file       Trauma/ Abuse/ Neglect none reported    Physical Examination:     Vital Signs:  Vitals:    09/28/19 2315 09/29/19 0707   BP: 101/55 96/55   BP Location: Right arm Left arm   Pulse: 73 91   Resp: 16 20   Temp: 98 7 °F (37 1 °C) 97 7 °F (36 5 °C)   TempSrc: Temporal Temporal   SpO2: 97% 95%   Weight: 71 5 kg (157 lb 9 6 oz)    Height: 5' 4" (1 626 m)          Appearance:  age appropriate and casually dressed   Behavior:  normal   Speech:  normal volume   Mood:  anxious and depressed   Affect:  constricted   Thought Process:  normal   Thought Content:  Somatic   Perceptual Disturbances: None   Risk Potential: none   Sensorium:  person, place, situation and time   Cognition:  intact   Consciousness:  alert and awake    Attention: attention span and concentration were age appropriate   Intellect: average   Insight:  fair   Judgment: fair and good      Motor Activity: no abnormal movements           Diagnostic Studies:     Recent Labs:  Results Reviewed     None          I/O Past 24 hours: No intake/output data recorded  I/O this shift:  In: 360 [P O :360]  Out: -         Impression / Plan: Moderate episode of recurrent major depressive disorder ([address] )  Rule out somatoform disorder  Recommended Treatment:      Medications  1) continue with Abilify 2 mg, Cymbalta 60 mg    Non-pharmacological treatments  1) Continue with group therapy, milieu therapy and occupational therapy  2) Medical will be consulted to help manage comorbid conditions    Safety  1) Safety/communication plan established targeting dynamic risk factors above  Counseling / Coordination of Care    Total floor / unit time spent today 50 minutes  Greater than 50% of total time was spent with the patient and / or family counseling and / or coordination of care  A description of the counseling / coordination of care  Patient's Rights, confidentiality and exceptions to confidentiality, use of automated medical record, [facility] staff access to medical record, and consent to treatment reviewed        Marva Urbina MD

## 2019-09-29 NOTE — ASSESSMENT & PLAN NOTE
· Continue supportive care, pain management  · Patient reports she also has been told she has psychosomatic symptoms

## 2019-09-29 NOTE — NURSING NOTE
Patient is visible on the unit and social with select peers  Affect is constricted  Denies SI and states she is depressed  States she overreacted in the ER last night and she does not want to kill herself  She is tired of living in pain  Patient was given Miralax this AM for constipation  Patient is walking without any assistive devices (on last admission, patient could not walk without a walker) and her gait is steady  Medication compliant  VSS  Will monitor

## 2019-09-29 NOTE — TREATMENT PLAN
TREATMENT PLAN REVIEW - [address] 62 y o  1955 female MRN: [id number]    [address] [address] [address] [facility] Room / Bed: Britton Elliott [facility] Encounter: [id number]          Admit Date/Time:  9/28/2019 11:20 PM    Treatment Team: Attending Provider: Wendy Duke MD; Registered Nurse: Amanda Le RN; Physician Assistant: Gina Wise; Patient Care Assistant: Jimi Martinez    Diagnosis: Principal Problem:     Moderate episode of recurrent major depressive disorder [facility]      Patient Strengths/Assets: ability for insight, average or above intelligence, capable of independent living, cooperative, communication skills    Patient Barriers/Limitations: difficulty adapting    Short Term Goals: decrease in depressive symptoms, decrease in anxiety symptoms, decrease in suicidal thoughts, improvement in self care    Long Term Goals: improvement in depression, improvement in anxiety, stabilization of mood, free of suicidal thoughts    Progress Towards Goals: starting psychiatric medications as prescribed    Recommended Treatment: medication management, patient medication education, group therapy, milieu therapy, continued Behavioral Health psychiatric evaluation/assessment process    Treatment Frequency: daily medication monitoring, group and milieu therapy daily, monitoring through interdisciplinary rounds, monitoring through weekly patient care conferences    Expected Discharge Date: 10/12    Discharge Plan: referral for outpatient medication management with a psychiatrist, referral for outpatient psychotherapy    Treatment Plan Created/Updated By: Wendy Duke MD

## 2019-09-29 NOTE — ED NOTES
Spoke with Zane Murrell from crisis  Patient accepted to [facility] and awaiting transfer time  She will post and call with information of such       Janeth Sing  09/28/19 9987

## 2019-09-29 NOTE — ED NOTES
Insurance Authorization for admission:   Phone call placed to [employer]  Phone number: [phone]  Spoke to Gunjan V      6 days approved  Level of care: Acute Inpatient  (201)  Review on 10/04/2019 w/ Oliver Kaufman @ [phone]  Authorization # [id number]  EVS (Eligibility Verification System) [facility] - [phone]  Automated system indicates: Patient not on file with MA coverage  Insurance Authorization for Transportation:    Phone call placed to [place], [phone]; per recording offices are closed and authorization will need to be obtained during business hours: JON 8a-8p       ALEXANDRIA Braswell  09/28/19   4527

## 2019-09-29 NOTE — ED NOTES
Patient awake and speaking with 1;1 sitter  Patient calm and co-operative at this time  Will continue to monitor       Manjinder Dates  09/28/19 2120

## 2019-09-29 NOTE — PROGRESS NOTES
Initial Psychiatric Evaluation    Medical Record Number: [id number]  Encounter: [id number]      History:     Val Glover is an 62 y o , female, admitted to the psychiatric unit under a 201 status to Dr Mayito Rinaldi' service with the chief complaint of increased depression and irritability with passive suicidal ideation  Patient presented to the emergency department with worsening abdominal pain and nausea  Patient had extensive workup in the emergency department and there was no  Intra abdominal process  Once the patient was medically cleared she stated that due to her chronic pain is she was discharged she was going to go home and kill herself  Patient was recently admitted to [facility] for 12 days  Once she was medically cleared she was transferred to the [facility] unit at [facility] from [address] [place] [address]  Patient is alert and oriented x4  She states that she did not react as she should have he when she was told she did not have any abdominal issues  Patient states she has been irritable  She states she is tired of living in pain which makes her more depressed  Patient is tearful at times during conversation  She states that she has not been having significant bowel movements and feels constipated  She states she has been bloated  Patient was sent home after her last admission and states she did not have physical therapy because her spinal surgeon wanted to wait until he read the MRI report  Patient has been walking well and doing some exercises at home  Patient states currently she does feel depressed  She had a 4/4 anxiety yesterday  She states this is better now  Her appetite has been decreased  She states that she is not suicidal at this time  She states I did not react well yesterday and stated I would be better off dead than dealing with this pain      Past Medical History:   Diagnosis Date    Abdominal adhesions     Anesthesia complication difficult to wake up    Depression     Disease of thyroid gland     Fibromyalgia     GERD (gastroesophageal reflux disease)     Lazy eye     resolved: 3/27/17    Osteopenia     with joint pain-elevated DEV    Psychiatric disorder     Tinnitus     Wears glasses     for reading       Past surgical history:  Past Surgical History:   Procedure Laterality Date    ABDOMINAL SURGERY      lysis of adhesions x 2    APPENDECTOMY      CERVICAL FUSION      CHOLECYSTECTOMY      open    DILATION AND CURETTAGE OF UTERUS      NEUROMA EXCISION Right 5/26/2017    Procedure: EXCISION MASS / FIBROMA FOOT;  Surgeon: Justin Bradshaw DPM;  Location: [facility];  Service:     RIGHT OOPHORECTOMY      WISDOM TOOTH EXTRACTION      x4       Family history:  Family History   Problem Relation Age of Onset    Heart disease Mother     Stroke Mother 58    COPD Mother     Arthritis Mother     Hypertension Father     Kidney disease Brother         kidney transplant    No Known Problems Sister     No Known Problems Maternal Aunt     No Known Problems Maternal Uncle     No Known Problems Paternal Aunt     No Known Problems Paternal Uncle     No Known Problems Maternal Grandmother     No Known Problems Maternal Grandfather     No Known Problems Paternal Grandmother     No Known Problems Paternal Grandfather     ADD / ADHD Neg Hx     Anesthesia problems Neg Hx     Cancer Neg Hx     Clotting disorder Neg Hx     Collagen disease Neg Hx     Diabetes Neg Hx     Dislocations Neg Hx     Learning disabilities Neg Hx     Neurological problems Neg Hx     Osteoporosis Neg Hx     Rheumatologic disease Neg Hx     Scoliosis Neg Hx     Vascular Disease Neg Hx        Current medications:    Current Facility-Administered Medications:     acetaminophen (TYLENOL) tablet 325 mg, 325 mg, Oral, Q6H PRN, Ron Sung MD    aluminum-magnesium hydroxide-simethicone (MYLANTA) 200-200-20 mg/5 mL oral suspension 15 mL, 15 mL, Oral, Q4H PRN, Jasper Henderson MD    ARIPiprazole (ABILIFY) tablet 2 mg, 2 mg, Oral, Daily, Suni Smith PA-C    benztropine (COGENTIN) injection 1 mg, 1 mg, Intramuscular, Q8H PRN, Jasper Henderson MD    benztropine (COGENTIN) tablet 1 mg, 1 mg, Oral, Q8H PRN, Jasper Henderson MD    DULoxetine (CYMBALTA) delayed release capsule 60 mg, 60 mg, Oral, Daily, Anita Agee PA-C    gabapentin (NEURONTIN) capsule 300 mg, 300 mg, Oral, Daily, Anita Agee PA-C    gabapentin (NEURONTIN) capsule 600 mg, 600 mg, Oral, HS, Suni Smith PA-C    haloperidol (HALDOL) tablet 1 mg, 1 mg, Oral, Q6H PRN, Jasper Henderson MD    haloperidol lactate (HALDOL) injection 2 mg, 2 mg, Intramuscular, Q6H PRN, Jasper Henderson MD    hydrOXYzine HCL (ATARAX) tablet 25 mg, 25 mg, Oral, Q6H PRN, Jasper Henderson MD    magnesium hydroxide (MILK OF MAGNESIA) 400 mg/5 mL oral suspension 30 mL, 30 mL, Oral, Daily PRN, Jasper Henderson MD    polyethylene glycol (MIRALAX) packet 17 g, 17 g, Oral, Daily, Suni Smith PA-C    risperiDONE (RisperDAL M-TABS) dispersible tablet 1 mg, 1 mg, Oral, Q8H PRN, Jasper Henderson MD    senna-docusate sodium (SENOKOT S) 8 6-50 mg per tablet 2 tablet, 2 tablet, Oral, HS, Suni Smith PA-C    Psychiatric Review Of Systems:  sleep: good  appetite changes: yes, decreased  weight changes: no  energy/anergy: poor  interest/pleasure/anhedonia: poor  somatic symptoms: no  anxiety/panic: yes  gertrude: no  guilty/hopeless: yes  self injurious behavior/risky behavior: no    Past Psychiatric History:   Currently in treatment with PCP  Past Suicide attempts: No  Past Violent behavior: No  Past Psychiatric Hospitalizations: yes with recent discharge on 9/20/19    Allergies:   Allergies   Allergen Reactions    Demerol [Meperidine] Lightheadedness     Reaction Date: 11Jan2006;     Sulfa Antibiotics Hives     Reaction Date: 11Jan2006;        Social History:  Social History     Socioeconomic History  Marital status: /Civil Union     Spouse name: Not on file    Number of children: Not on file    Years of education: Not on file    Highest education level: Not on file   Occupational History    Not on file   Social Needs    Financial resource strain: Not on file    Food insecurity:     Worry: Not on file     Inability: Not on file    Transportation needs:     Medical: Not on file     Non-medical: Not on file   Tobacco Use    Smoking status: Never Smoker    Smokeless tobacco: Never Used   Substance and Sexual Activity    Alcohol use: Not Currently     Frequency: Never     Binge frequency: Never    Drug use: No    Sexual activity: Not Currently   Lifestyle    Physical activity:     Days per week: Not on file     Minutes per session: Not on file    Stress: Not on file   Relationships    Social connections:     Talks on phone: Not on file     Gets together: Not on file     Attends Worship service: Not on file     Active member of club or organization: Not on file     Attends meetings of clubs or organizations: Not on file     Relationship status: Not on file    Intimate partner violence:     Fear of current or ex partner: Not on file     Emotionally abused: Not on file     Physically abused: Not on file     Forced sexual activity: Not on file   Other Topics Concern    Not on file   Social History Narrative    Not on file       Physical Examination:     Vital Signs:  Vitals:    09/28/19 2315 09/29/19 0707   BP: 101/55 96/55   BP Location: Right arm Left arm   Pulse: 73 91   Resp: 16 20   Temp: 98 7 °F (37 1 °C) 97 7 °F (36 5 °C)   TempSrc: Temporal Temporal   SpO2: 97% 95%   Weight: 71 5 kg (157 lb 9 6 oz)    Height: 5' 4" (1 626 m)          Appearance:  age appropriate   Behavior:  normal   Speech:  normal volume   Mood:  constricted, depressed and irritable   Affect:  constricted   Thought Process:  normal   Thought Content:  normal   Perceptual Disturbances: None   Risk Potential: none Sensorium:  person, place, situation and time   Cognition:  intact   Consciousness:  alert and awake    Attention: attention span and concentration were age appropriate   Intellect: average   Insight:  fair   Judgment: fair      Motor Activity: no abnormal movements           Diagnostic Studies:     Recent Labs:  Results Reviewed     None          I/O Past 24 hours:  No intake/output data recorded  No intake/output data recorded  Impression / Plan: Moderate episode of recurrent major depressive disorder ([facility] [address] )    Recommended Treatment:      Medications  1) continue Cymbalta 60 mg daily  Add Abilify 2 mg daily  Non-pharmacological treatments  1) Continue with group therapy, milieu therapy and occupational therapy  2) Medical will be consulted to help manage comorbid conditions    Safety  1) Safety/communication plan established targeting dynamic risk factors above  Counseling / Coordination of Care    Total floor / unit time spent today 50 minutes  Greater than 50% of total time was spent with the patient and / or family counseling and / or coordination of care  A description of the counseling / coordination of care  Patient's Rights, confidentiality and exceptions to confidentiality, use of automated medical record, [address] [place] [address] staff access to medical record, and consent to treatment reviewed        Benny Winston PA-C

## 2019-09-29 NOTE — ASSESSMENT & PLAN NOTE
· Continue supportive care  · Patient reports she was not receiving help for her abdominal pain which caused her to be suicidal  · CT and CTA both negative for any findings    Cholecystectomy noted

## 2019-09-29 NOTE — ASSESSMENT & PLAN NOTE
· Patient has history of multiple spinal surgeries, outpatient followup  · BenGay and heating pad p r n    · Supportive care

## 2019-09-29 NOTE — ED NOTES
Patient is accepted at [facility]  Patient is accepted by Dr Marlene Shi per Travis Owens in Intake  Transportation is arranged with Sharron Gilman  Transportation is scheduled for 2300  Patient may go to the floor at anytime  *Nurse report is to be called to [phone] prior to patient transfer  ALEXANDRIA Hawkins  09/28/19   2852

## 2019-09-29 NOTE — ED NOTES
Patient left SH with [facility] with all proper paper work and all belongings from alvaro Watts  09/28/19 6598

## 2019-09-29 NOTE — CONSULTS
[address] Internal Medicine  Consult- Sujey Mckinney 1955, 62 y o  female MRN: [id number]  Unit/Bed#: Neto Peck 464-60 Encounter: [id number]  Primary Care Provider: Brayan Lechuga MD   Date and time admitted to hospital: 9/28/2019 11:20 PM  Inpatient consult for Medical Clearance for [address] patient  Consult performed by: Apple Tran PA-C  Consult ordered by: Joao Jordan MD        Spinal stenosis  Assessment & Plan  · Patient has history of multiple spinal surgeries, outpatient followup  · BenGay and heating pad p r n  · Supportive care    Fibromyalgia  Assessment & Plan  · Continue supportive care, pain management  · Patient reports she also has been told she has psychosomatic symptoms    GERD (gastroesophageal reflux disease)  Assessment & Plan  · Continue PPI  · Continue Mylanta p r n  Irritable bowel syndrome  Assessment & Plan  · Continue supportive care  · Patient reports she was not receiving help for her abdominal pain which caused her to be suicidal  · CT and CTA both negative for any findings  Cholecystectomy noted    * Moderate episode of recurrent major depressive disorder ([facility] [address] )  Assessment & Plan  · Management per Psychiatry        VTE Prophylaxis: Reason for no pharmacologic prophylaxis Ambulatory  / reason for no mechanical VTE prophylaxis Ambulatory     Recommendations for Discharge:  · Per primary team, regular follow-up with Surgical team for spinal surgery    Counseling / Coordination of Care Time: 45 minutes  Greater than 50% of total time spent on patient counseling and coordination of care  Collaboration of Care: Were Recommendations Directly Discussed with Primary Treatment Team? - No     History of Present Illness:    Sujey Mckinney is a 62 y o  female with past medical history of spinal stenosis, status post spinal surgery, fibromyalgia, depression who is originally admitted to the [facility] service due to suicidal ideation   We are consulted for management of chronic medical conditions  Patient reports that her pain from her abdomen was so severe that she felt she should kill herself  CT and CTA of abdomen performed on admission completely normal   She does have fibromyalgia and a history of spinal stenosis  Will encourage treatment of chronic spinal pain and had more aggressive bowel regimen as she believes her bowel movements are not frequent enough  She did have 1 BM immediately prior to examination today and appeared to be relatively comfortable  Review of Systems:    Review of Systems   Gastrointestinal: Positive for abdominal pain  Past Medical and Surgical History:     Past Medical History:   Diagnosis Date    Abdominal adhesions     Anesthesia complication     difficult to wake up    Depression     Disease of thyroid gland     Fibromyalgia     GERD (gastroesophageal reflux disease)     Lazy eye     resolved: 3/27/17    Osteopenia     with joint pain-elevated DEV    Psychiatric disorder     Tinnitus     Wears glasses     for reading       Past Surgical History:   Procedure Laterality Date    ABDOMINAL SURGERY      lysis of adhesions x 2    APPENDECTOMY      CERVICAL FUSION      CHOLECYSTECTOMY      open    DILATION AND CURETTAGE OF UTERUS      NEUROMA EXCISION Right 5/26/2017    Procedure: EXCISION MASS / FIBROMA FOOT;  Surgeon: Quincy Winston DPM;  Location: [facility];  Service:     RIGHT OOPHORECTOMY      WISDOM TOOTH EXTRACTION      x4       Meds/Allergies:    PTA meds:   Prior to Admission Medications   Prescriptions Last Dose Informant Patient Reported? Taking?    ALPRAZolam (XANAX) 0 5 mg tablet   Yes No   DULoxetine (CYMBALTA) 30 mg delayed release capsule   Yes No   Sig: Take 60 mg by mouth daily    ESOMEPRAZOLE MAGNESIUM PO   Yes No   calcium carbonate-vitamin D (OSCAL-D) 500 mg-200 units per tablet   No No   Sig: Take 1 tablet by mouth daily with breakfast   esomeprazole (NexIUM) 40 MG capsule   Yes No   Sig: Take 40 mg by mouth every morning before breakfast   gabapentin (NEURONTIN) 300 mg capsule   No No   Sig: Take 2 capsules (600 mg total) by mouth daily at bedtime   gabapentin (NEURONTIN) 300 mg capsule   No No   Sig: Take 1 capsule (300 mg total) by mouth daily   levothyroxine 25 mcg tablet   No No   Sig: Take 1 tablet (25 mcg total) by mouth daily in the early morning   melatonin 3 mg   No No   Sig: Take 1 tablet (3 mg total) by mouth daily at bedtime   senna (SENOKOT) 8 6 mg   No No   Sig: Take 2 tablets (17 2 mg total) by mouth daily at bedtime   traZODone (DESYREL) 50 mg tablet   No No   Sig: Take 1 tablet (50 mg total) by mouth daily at bedtime as needed (insomnia)      Facility-Administered Medications: None       Allergies:    Allergies   Allergen Reactions    Demerol [Meperidine] Lightheadedness     Reaction Date: 11Jan2006;     Sulfa Antibiotics Hives     Reaction Date: 11Jan2006;        Social History:     Marital Status: /Civil Union    Substance Use History:   Social History     Substance and Sexual Activity   Alcohol Use Not Currently    Frequency: Never    Binge frequency: Never     Social History     Tobacco Use   Smoking Status Never Smoker   Smokeless Tobacco Never Used     Social History     Substance and Sexual Activity   Drug Use No       Family History:    Family History   Problem Relation Age of Onset    Heart disease Mother     Stroke Mother 58    COPD Mother     Arthritis Mother     Hypertension Father     Kidney disease Brother         kidney transplant    No Known Problems Sister     No Known Problems Maternal Aunt     No Known Problems Maternal Uncle     No Known Problems Paternal Aunt     No Known Problems Paternal Uncle     No Known Problems Maternal Grandmother     No Known Problems Maternal Grandfather     No Known Problems Paternal Grandmother     No Known Problems Paternal Grandfather     ADD / ADHD Neg Hx     Anesthesia problems Neg Hx     Cancer Neg Hx     Clotting disorder Neg Hx     Collagen disease Neg Hx     Diabetes Neg Hx     Dislocations Neg Hx     Learning disabilities Neg Hx     Neurological problems Neg Hx     Osteoporosis Neg Hx     Rheumatologic disease Neg Hx     Scoliosis Neg Hx     Vascular Disease Neg Hx        Physical Exam:     Vitals:   Blood Pressure: 96/55 (09/29/19 0707)  Pulse: 91 (09/29/19 0707)  Temperature: 97 7 °F (36 5 °C) (09/29/19 0707)  Temp Source: Temporal (09/29/19 0707)  Respirations: 20 (09/29/19 0707)  Height: 5' 4" (162 6 cm) (09/28/19 2315)  Weight - Scale: 71 5 kg (157 lb 9 6 oz) (09/28/19 2315)  SpO2: 95 % (09/29/19 0707)    Physical Exam   Constitutional: She appears well-developed and well-nourished  No distress  HENT:   Head: Normocephalic and atraumatic  Eyes: Conjunctivae are normal  No scleral icterus  Cardiovascular: Normal rate and regular rhythm  No murmur heard  Pulmonary/Chest: Effort normal and breath sounds normal  No respiratory distress  She has no wheezes  She has no rales  Abdominal: Soft  She exhibits no distension  There is no tenderness  Musculoskeletal: She exhibits no edema  Neurological: She is alert  CN II-XII grossly intact   Skin: Skin is warm and dry  No erythema  Psychiatric: Her mood appears anxious  Nursing note and vitals reviewed  Additional Data:     Lab Results: I have personally reviewed pertinent reports        Results from last 7 days   Lab Units 09/28/19  1239   WBC Thousand/uL 5 68   HEMOGLOBIN g/dL 12 1   HEMATOCRIT % 37 8   PLATELETS Thousands/uL 183   NEUTROS PCT % 66   LYMPHS PCT % 19   MONOS PCT % 9   EOS PCT % 5     Results from last 7 days   Lab Units 09/28/19  1239   SODIUM mmol/L 139   POTASSIUM mmol/L 4 1   CHLORIDE mmol/L 103   CO2 mmol/L 27   BUN mg/dL 14   CREATININE mg/dL 1 03   ANION GAP mmol/L 9   CALCIUM mg/dL 8 9   ALBUMIN g/dL 3 4*   TOTAL BILIRUBIN mg/dL 0 30   ALK PHOS U/L 71   ALT U/L 28   AST U/L 20   GLUCOSE RANDOM mg/dL 119             Lab Results   Component Value Date/Time    HGBA1C 6 0 02/14/2019 05:36 AM    HGBA1C 6 0 03/04/2018 11:50 AM         Results from last 7 days   Lab Units 09/28/19  1458 09/28/19  1239   LACTIC ACID mmol/L 1 8 2 2*       Imaging: I have personally reviewed pertinent reports  No orders to display       EKG, Pathology, and Other Studies Reviewed on Admission:   · EKG:  NSR, 73 bpm ,     ** Please Note: This note has been constructed using a voice recognition system   **

## 2019-09-29 NOTE — EMTALA/ACUTE CARE TRANSFER
[address] [place] [address]  Dept: [phone]      EMTALA TRANSFER CONSENT    NAME Nixon Farias                                         1955                              MRN [id number]    I have been informed of my rights regarding examination, treatment, and transfer   by Dr Jackson att  providers found    Benefits: Specialized equipment and/or services available at the receiving facility (Include comment)________________________(inpt psychiatric bed)    Risks: Potential for delay in receiving treatment, Potential deterioration of medical condition, Increased discomfort during transfer, Possible worsening of condition or death during transfer      Consent for Transfer:  I acknowledge that my medical condition has been evaluated and explained to me by the emergency department physician or other qualified medical person and/or my attending physician, who has recommended that I be transferred to the service of  Accepting Physician: Je Anderson at [address] Name, [address] : [facility]  The above potential benefits of such transfer, the potential risks associated with such transfer, and the probable risks of not being transferred have been explained to me, and I fully understand them  The doctor has explained that, in my case, the benefits of transfer outweigh the risks  I agree to be transferred  I authorize the performance of emergency medical procedures and treatments upon me in both transit and upon arrival at the receiving facility  Additionally, I authorize the release of any and all medical records to the receiving facility and request they be transported with me, if possible  I understand that the safest mode of transportation during a medical emergency is an ambulance and that the Hospital advocates the use of this mode of transport   Risks of traveling to the receiving facility by car, including absence of medical control, life sustaining equipment, such as oxygen, and medical personnel has been explained to me and I fully understand them  (SHIRLEY CORRECT BOX BELOW)  [  ]  I consent to the stated transfer and to be transported by ambulance/helicopter  [  ]  I consent to the stated transfer, but refuse transportation by ambulance and accept full responsibility for my transportation by car  I understand the risks of non-ambulance transfers and I exonerate the Hospital and its staff from any deterioration in my condition that results from this refusal     X___________________________________________    DATE  19  TIME________  Signature of patient or legally responsible individual signing on patient behalf           RELATIONSHIP TO PATIENT_________________________          Provider Certification    NAME Gil Richardson                                         1955                              MRN [id number]    A medical screening exam was performed on the above named patient  Based on the examination:    Condition Necessitating Transfer The primary encounter diagnosis was Generalized abdominal pain  Diagnoses of Suicidal behavior without attempted self-injury and Esophagitis were also pertinent to this visit      Patient Condition: The patient has been stabilized such that within reasonable medical probability, no material deterioration of the patient condition or the condition of the unborn child(josé miguel) is likely to result from the transfer    Reason for Transfer: Level of Care needed not available at this facility(inpt psychiatric bed)    Transfer Requirements:  [address]   · Space available and qualified personnel available for treatment as acknowledged by ALEXANDRIA Brizuela  · Agreed to accept transfer and to provide appropriate medical treatment as acknowledged by       Singh Mina  · Appropriate medical records of the examination and treatment of the patient are provided at the time of transfer   STAFF INITIAL WHEN COMPLETED _______  · Transfer will be performed by qualified personnel from [address]  and appropriate transfer equipment as required, including the use of necessary and appropriate life support measures  Provider Certification: I have examined the patient and explained the following risks and benefits of being transferred/refusing transfer to the patient/family:  General risk, such as traffic hazards, adverse weather conditions, rough terrain or turbulence, possible failure of equipment (including vehicle or aircraft), or consequences of actions of persons outside the control of the transport personnel      Based on these reasonable risks and benefits to the patient and/or the unborn child(josé miguel), and based upon the information available at the time of the patients examination, I certify that the medical benefits reasonably to be expected from the provision of appropriate medical treatments at another medical facility outweigh the increasing risks, if any, to the individuals medical condition, and in the case of labor to the unborn child, from effecting the transfer      X____________________________________________ DATE 09/28/19        TIME_______      ORIGINAL - SEND TO MEDICAL RECORDS   COPY - SEND WITH PATIENT DURING TRANSFER

## 2019-09-29 NOTE — ED NOTES
Call from Marbella Anand from [facility] transport @ 23:00     Lucie Hernandez  09/28/19 0421

## 2019-09-29 NOTE — ED NOTES
Patient is requesting a phone to call her   Portable phone provided to patient  1;1 edison is present       Amber Cool  09/28/19 2367

## 2019-09-29 NOTE — ED NOTES
Patient sleeping with lights and tv off in room  1;1 sitter is present  Will continue to monitor       Jennifer Jac  09/28/19 6670

## 2019-09-30 PROCEDURE — 97167 OT EVAL HIGH COMPLEX 60 MIN: CPT

## 2019-09-30 RX ADMIN — GABAPENTIN 300 MG: 300 CAPSULE ORAL at 09:02

## 2019-09-30 RX ADMIN — GABAPENTIN 600 MG: 300 CAPSULE ORAL at 21:49

## 2019-09-30 RX ADMIN — ACETAMINOPHEN 975 MG: 325 TABLET ORAL at 06:18

## 2019-09-30 RX ADMIN — SENNOSIDES AND DOCUSATE SODIUM 2 TABLET: 8.6; 5 TABLET ORAL at 21:50

## 2019-09-30 RX ADMIN — PANTOPRAZOLE SODIUM 40 MG: 40 TABLET, DELAYED RELEASE ORAL at 06:19

## 2019-09-30 RX ADMIN — OYSTER SHELL CALCIUM WITH VITAMIN D 1 TABLET: 500; 200 TABLET, FILM COATED ORAL at 09:02

## 2019-09-30 RX ADMIN — ARIPIPRAZOLE 2 MG: 2 TABLET ORAL at 09:02

## 2019-09-30 RX ADMIN — DULOXETINE HYDROCHLORIDE 60 MG: 60 CAPSULE, DELAYED RELEASE ORAL at 09:02

## 2019-09-30 RX ADMIN — ACETAMINOPHEN 975 MG: 325 TABLET ORAL at 21:48

## 2019-09-30 NOTE — PROGRESS NOTES
Psychiatry Progress Note    Subjective: Interval History     The patient is sitting out in the dining room area this morning  She states that she is feeling depressed  Patient states that her stomach is upset this morning but she is going to try and eat breakfast   She states she does not feel like eating  Patient states when she feels well she has a good appetite  Patient refused dinner last night  She states that she did have a bowel movement yesterday and this morning  She states she did sleep well  She is denying any suicidal ideation      Behavior over the last 24 hours:  unchanged  Sleep: normal  Appetite: poor  Medication side effects: No  ROS: no complaints    Current medications:    Current Facility-Administered Medications:     acetaminophen (TYLENOL) tablet 975 mg, 975 mg, Oral, Q8H KIRBY, Lilly LAKE PA-C, 975 mg at 09/30/19 0618    aluminum-magnesium hydroxide-simethicone (MYLANTA) 200-200-20 mg/5 mL oral suspension 15 mL, 15 mL, Oral, Q4H PRN, Amor Woodard MD    ARIPiprazole (ABILIFY) tablet 2 mg, 2 mg, Oral, Daily, Kenyon Escobedo PA-C, 2 mg at 09/29/19 9057    benztropine (COGENTIN) injection 1 mg, 1 mg, Intramuscular, Q8H PRN, Amor Woodard MD    benztropine (COGENTIN) tablet 1 mg, 1 mg, Oral, Q8H PRN, Amor Woodard MD    calcium carbonate-vitamin D (OSCAL-D) 500 mg-200 units per tablet 1 tablet, 1 tablet, Oral, Daily With Breakfast, Lilly LAKE PA-C    DULoxetine (CYMBALTA) delayed release capsule 60 mg, 60 mg, Oral, Daily, Anita Agee PA-C, 60 mg at 09/29/19 0905    gabapentin (NEURONTIN) capsule 300 mg, 300 mg, Oral, Daily, Anita Agee PA-C, 300 mg at 09/29/19 0905    gabapentin (NEURONTIN) capsule 600 mg, 600 mg, Oral, HS, Anita Agee PA-C, 600 mg at 09/29/19 2146    haloperidol (HALDOL) tablet 1 mg, 1 mg, Oral, Q6H PRN, Amor Woodard MD    haloperidol lactate (HALDOL) injection 2 mg, 2 mg, Intramuscular, Q6H PRN, Amor Woodard MD  [facility] hydrOXYzine HCL (ATARAX) tablet 25 mg, 25 mg, Oral, Q6H PRN, Denisa Best MD    lidocaine (LIDODERM) 5 % patch 1 patch, 1 patch, Topical, Daily PRN, Lilly LAKE PA-C    magnesium hydroxide (MILK OF MAGNESIA) 400 mg/5 mL oral suspension 30 mL, 30 mL, Oral, Daily PRN, Denisa Best MD    menthol-methyl salicylate (BENGAY) 22-35 % cream 1 application, 1 application, Apply externally, 4x Daily PRN, Lilly LAKE PA-C    pantoprazole (PROTONIX) EC tablet 40 mg, 40 mg, Oral, Early Morning, Lilly LAKE PA-C, 40 mg at 09/30/19 1524    polyethylene glycol (MIRALAX) packet 17 g, 17 g, Oral, Daily, Al Byrne PA-C, 17 g at 09/29/19 5125    risperiDONE (RisperDAL M-TABS) dispersible tablet 1 mg, 1 mg, Oral, Q8H PRN, Denisa Best MD    senna-docusate sodium (SENOKOT S) 8 6-50 mg per tablet 2 tablet, 2 tablet, Oral, HS, Al Byrne PA-C, 2 tablet at 09/29/19 8339    Current Problem List:    Patient Active Problem List   Diagnosis    Alcohol dependence in remission ([facility] [address] )    Allergic rhinitis    Arthropathy of multiple sites    Moderate episode of recurrent major depressive disorder (HCC)    Esophagitis    Irritable bowel syndrome    Raynaud's syndrome without gangrene    Osteopenia    Radiculopathy of lumbar region    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[facility] [address] [facility]    Focal neurological deficit    GERD (gastroesophageal reflux disease)    Cervical myelopathy with cervical radiculopathy    History of recent intraspinal surgery    Autonomic orthostatic hypotension    History of migraine headaches    Mixed dyslipidemia    Lesion of lachelle    Hypertension    Right sided temporal headache    Fibromyalgia    ELLY (generalized anxiety disorder)    Herniated cervical disc    Melanoma ([address] )    Spinal stenosis    BMI 26 0-26 9,adult    Psychogenic weakness    History of cholecystectomy    Ambulatory dysfunction    Other specified hypothyroidism       Problem list reviewed 09/30/19     Objective: Vital Signs:  Vitals:    09/29/19 0707 09/29/19 1539 09/29/19 2030 09/30/19 0644   BP: 96/55 139/82 121/75 111/76   BP Location: Left arm Left arm Right arm Right arm   Pulse: 91 80 81 89   Resp: 20 16 16 16   Temp: 97 7 °F (36 5 °C) 97 5 °F (36 4 °C) 98 9 °F (37 2 °C) 98 3 °F (36 8 °C)   TempSrc: Temporal Temporal Temporal Temporal   SpO2: 95% 99% 95% 96%   Weight:       Height:             Appearance:  age appropriate and casually dressed   Behavior:  normal   Speech:  normal volume   Mood:  constricted and depressed   Affect:  constricted   Thought Process:  normal   Thought Content:  normal   Perceptual Disturbances: None   Risk Potential: none   Sensorium:  person, place, situation and time   Cognition:  intact   Consciousness:  alert and awake    Attention: attention span and concentration were age appropriate   Intellect: average   Insight:  fair   Judgment: fair      Motor Activity: no abnormal movements       I/O Past 24 hours:  I/O last 3 completed shifts: In: 600 [P O :600]  Out: -   No intake/output data recorded  Labs:  Reviewed 09/30/19    Progress Toward Goals: unchanged    Assessment / Plan: Moderate episode of recurrent major depressive disorder ([facility] [address] )    Recommended Treatment:      Medication changes:  1) continue current medication regimen  Abilify added yesterday  Non-pharmacological treatments  1) Continue with group therapy, milieu therapy and occupational therapy  Safety  1) Safety/communication plan established targeting dynamic risk factors above  2) Risks, benefits, and possible side effects of medications explained to patient and patient verbalizes understanding  Counseling / Coordination of Care    Total floor / unit time spent today 20 minutes  Greater than 50% of total time was spent with the patient and / or family counseling and / or coordination of care  A description of the counseling / coordination of care       Patient's Rights, confidentiality and exceptions to confidentiality, use of automated medical record, [facility] staff access to medical record, and consent to treatment reviewed      Amos Iverson PA-C
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
## 2019-09-30 NOTE — NURSING NOTE
Patient is observed resting in  bed with eyes closed  No signs of distress noted  Will continue to monitor on q 7 minute checks  Pt denies S/I,HI,VH

## 2019-09-30 NOTE — OCCUPATIONAL THERAPY NOTE
Occupational Therapy Evaluation      Angel Dang    9/30/2019    Patient Active Problem List   Diagnosis    Alcohol dependence in remission ([facility] [address] )    Allergic rhinitis    Arthropathy of multiple sites    Moderate episode of recurrent major depressive disorder (HCC)    Esophagitis    Irritable bowel syndrome    Raynaud's syndrome without gangrene    Osteopenia    Radiculopathy of lumbar region    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[facility] [address] )    Focal neurological deficit    GERD (gastroesophageal reflux disease)    Cervical myelopathy with cervical radiculopathy    History of recent intraspinal surgery    Autonomic orthostatic hypotension    History of migraine headaches    Mixed dyslipidemia    Lesion of lachelle    Hypertension    Right sided temporal headache    Fibromyalgia    ELLY (generalized anxiety disorder)    Herniated cervical disc    Melanoma ([facility] [address] )    Spinal stenosis    BMI 26 0-26 9,adult    Psychogenic weakness    History of cholecystectomy    Ambulatory dysfunction    Other specified hypothyroidism       Past Medical History:   Diagnosis Date    Abdominal adhesions     Anesthesia complication     difficult to wake up    Depression     Disease of thyroid gland     Fibromyalgia     GERD (gastroesophageal reflux disease)     Lazy eye     resolved: 3/27/17    Osteopenia     with joint pain-elevated DEV    Psychiatric disorder     Tinnitus     Wears glasses     for reading       Past Surgical History:   Procedure Laterality Date    ABDOMINAL SURGERY      lysis of adhesions x 2    APPENDECTOMY      CERVICAL FUSION      CHOLECYSTECTOMY      open    DILATION AND CURETTAGE OF UTERUS      NEUROMA EXCISION Right 5/26/2017    Procedure: EXCISION MASS / FIBROMA FOOT;  Surgeon: Marce De La Garza DPM;  Location: [facility];  Service:     RIGHT OOPHORECTOMY      WISDOM TOOTH EXTRACTION      x4       Subjective:  RE: reason for hospitalization: "I started not feeling good " She stated that she went to various hospitals, was having abdominal pain an bloating  She stated that she made a comment at some point, "I feel I would be better off dead " she then decided while discussing with staff that she did want to return to the hospital (she had previous admission 09/7/19-09/20/19)  She expressed thought that the pain might have a psychogenic cause  Per her record, she was noted to come to the hospital via 201 admission due to increased depression and suicidal thoughts  She was noted to be preoccupied with abdominal pain, distraught about this including nausea  She stated that she was not being helped by medical treatment  Per her record, she was noted to make a statement about wanting to kill herself if she has to be in such pain  Prior Level of Function:  She stated that she was doing well after being discharged from the hospital on 9/20/19  She stated that she has been ambulating independently without her walker (had initially used walker, then did not need this)  She stated that she was able to do the steps in her basement (she stated that there are railings on both sides of steps)  She does plan to return to [facility] home where she lives with her   She reported independence in personal care  She stated when home, she was able to cook, do laundry ( carried laundry upstairs)  She stated that both she and  do housekeeping, she does money management  She stated that she did renew her 's license when home  She stated that she went grocery shopping with her , did push the grocery cart  She did not return to work at this time (she works 20 hours/ week ( for [employer])  She was handling her own medications and taking these as prescribed  Falls:  She stated that she has had 2 falls in the past 6 months, she did not have any recent falls when discharged on 09/20/19  Vision:  She stated that she does have glaucoma   She stated that she wears glasses for reading  Alert & Oriented   She is alert, oriented X 4    Hobbies/Interests:  "Gardening (in summer)  I was able to make flower arrangements  I like to do puzzles " She also stated that she likes [employer], likes to download puzzle apps, read  She stated that she likes anything with [employer] (she and ), she enjoys involvement in community service and being with her grandchildren  Support:   My AA sponsor, my good friend, boss, sister,  (she did tearfully state that her  had recently hurt her feelings regarding her readmission)    Pain:  She stated that she has abdominal pain that she rates 9 out of 10  She stated that she had taken Miralax for this but not this time  R UE AROM WFL's  RUE  Strength grasp strength G-/G  LUE AROM WFL's  LUE Strength grasp strength G-/G    She stated that she does feel as if she is stronger this time then she had been when initially admitted to OA on 9/7/19      Current Level of Function:  She was resting in bed when approached  She was pleasant but did report that her abdomen was bothering her  She is currently independent in ambulation, she was able to remove, don her sock while in bed with extra time and effort  She was generally pleasant but at times tearful when talking about being sick, how she is tired of being sick  She feeds herself independently in this environment  She is noted to be independent in hygiene needs per daily cares documentation  She was at times warm and friendly when not tearful  She was polite in her interactions with this familiar staff person  Work Task Skills:  Task Investment independently attends details of task until completion for 1-2 step whip stitch task  Problem Solving she was able to independently correct errors on 1-2 step whip stitch lace task, she did notice errors on multistep single cordovan task, she did require assistance for error correction    Concentration No difficulty   Follows Direction she was able to carry out multistep written instruction, 1-2 step verbal/ demonstrative instructions (whip stitch lace task)  Frustration Tolerance her frustration tolerance for tasks presented to her was at least fair, but her frustration tolerance regarding her health issues was at times guarded    Social Skills:  Dyadic Interaction (eye contact, makes needs known, goal directed conversation)  Eye contact: Fair  Quality of Response: Clear and Concise some times she would provide many details  Goal Directed: Yes  False Beliefs: she did not verbalize any specific false beliefs, but she did mention that her pain might be psychogenic in nature and she wanted to know if this is the case      Assessment performed:    Pt is a 62 y o  female seen for OT evaluation s/p admit to [facility] on 9/28/2019 w/ Moderate episode of recurrent major depressive disorder ([facility] [address] )  Comorbidities affecting pt's functional performance at time of assessment include: GERD, fibromyalgia, spinal stenosis, alcohol dependence in remission, allergic rhinitis, arthropathy of multiple sites, Raynaud's syndrome, osteopenia, radiculoptathy of lunbar region, lyme arthritis, focal neurological deficit, history of migraine headaches  Personal factors affecting pt at time of IE include:behavioral pattern, difficulty performing IADLS , health management  and pain issues, decreased coping skills, decreased life management skills, uncertainty regarding origion of pain, chronic illness  Prior to admission, pt was living at home with her   Upon evaluation: Pt requires treatment with consideration of the following deficits impacting occupational performance: decreased tolerance, impaired problem solving, impulsivity, decreased safety awareness, increased pain, decreased coping skills and decreased life management skills  From OT standpoint, recommendation at time of d/c would be to return to home with  and with community supports when stable        Patient Goal:  "I want to understand more, how my brain and body are connecting; I want to feel better "    Plan: she does plan to continue to attend programs, she stated that she likes discussion activities, she enjoyed chair yoga, likes music  OT will continue to monitor her status while she is on the OA unit         Group Recommendations:   Coping skills  Life management skills  Stress management skills  Healthy living  Relaxation  Positive focus  Self esteem  Exercise  Music  Chair yoga  Dealing with conflict    Alexander Liriano, OT

## 2019-09-30 NOTE — PROGRESS NOTES
09/30/19 0902   Team Meeting   Meeting Type Daily Rounds   Team Members Present   Team Members Present Physician;Nurse;   Physician Team Member M  [address] [facility] Team Member M  [address] [facility] Management Team Member MIGUEL Ortez      Reviewed with Team, Discussed medications, adding Abilify, patient states that she blew up over her abd pain and realized that she was not better yet

## 2019-09-30 NOTE — PROGRESS NOTES
09/30/19 1100   Activity/Group Checklist   Group Exercise  (seated musical)   Attendance Attended   Attendance Duration (min) 46-60   Interactions Other (Comment)  (timid in presentation yet actively engaged )   Affect/Mood Other (Comment)  (passive in demeanor )   Goals Achieved Able to engage in interactions

## 2019-09-30 NOTE — NURSING NOTE
Patient isolative to self   Feeling Depressed   Isolative to self  Denies suicidal ideation /AH/VH  Me and meal compliant   Will continue to monitor

## 2019-10-01 RX ORDER — NORTRIPTYLINE HYDROCHLORIDE 10 MG/1
10 CAPSULE ORAL
Status: DISCONTINUED | OUTPATIENT
Start: 2019-10-01 | End: 2019-10-04 | Stop reason: HOSPADM

## 2019-10-01 RX ADMIN — GABAPENTIN 300 MG: 300 CAPSULE ORAL at 08:30

## 2019-10-01 RX ADMIN — PANTOPRAZOLE SODIUM 40 MG: 40 TABLET, DELAYED RELEASE ORAL at 05:46

## 2019-10-01 RX ADMIN — NORTRIPTYLINE HYDROCHLORIDE 10 MG: 10 CAPSULE ORAL at 21:55

## 2019-10-01 RX ADMIN — OYSTER SHELL CALCIUM WITH VITAMIN D 1 TABLET: 500; 200 TABLET, FILM COATED ORAL at 08:30

## 2019-10-01 RX ADMIN — DULOXETINE HYDROCHLORIDE 60 MG: 60 CAPSULE, DELAYED RELEASE ORAL at 08:30

## 2019-10-01 RX ADMIN — GABAPENTIN 600 MG: 300 CAPSULE ORAL at 21:54

## 2019-10-01 RX ADMIN — ACETAMINOPHEN 975 MG: 325 TABLET ORAL at 05:46

## 2019-10-01 RX ADMIN — ACETAMINOPHEN 975 MG: 325 TABLET ORAL at 21:55

## 2019-10-01 RX ADMIN — SENNOSIDES AND DOCUSATE SODIUM 2 TABLET: 8.6; 5 TABLET ORAL at 21:55

## 2019-10-01 RX ADMIN — ARIPIPRAZOLE 2 MG: 2 TABLET ORAL at 08:30

## 2019-10-01 NOTE — NURSING NOTE
Patient visible but quiet; very little engagement with peers or staff  She brightens slightly on approach    Compliant to medications and unit routine

## 2019-10-01 NOTE — NURSING NOTE
Patient visible in the unit this am preoccupied with abdominal pain   Bowel movement yesterday   States '' my belly is floated '' abdomen soft and non tender   Patient do not want to see medical   States '' they already talked to me '' appears to be depressed   Denies anxiety , SI , AH/VH  Will continue to monitor

## 2019-10-01 NOTE — PROGRESS NOTES
Psychiatry Progress Note    Subjective: Interval History     The patient is visible on the unit this morning  Patient did not eat much of her breakfast stating that her stomach was upset  Patient is having bowel movements  Patient continues to be depressed  She is medication compliant here and has been tolerating the recent start of Abilify  She is attending group activities  She states she is sleeping pretty well  Will continue to monitor on recent medication adjustment      Behavior over the last 24 hours:  unchanged  Sleep: normal  Appetite: poor  Medication side effects: No  ROS: no complaints    Current medications:    Current Facility-Administered Medications:     acetaminophen (TYLENOL) tablet 975 mg, 975 mg, Oral, Q8H KIRBY, Lilly LAKE PA-C, 975 mg at 10/01/19 0546    aluminum-magnesium hydroxide-simethicone (MYLANTA) 200-200-20 mg/5 mL oral suspension 15 mL, 15 mL, Oral, Q4H PRN, Shannon Her MD    ARIPiprazole (ABILIFY) tablet 2 mg, 2 mg, Oral, Daily, Reggie Paez PA-C, 2 mg at 09/30/19 0902    benztropine (COGENTIN) injection 1 mg, 1 mg, Intramuscular, Q8H PRN, Shannon Her MD    benztropine (COGENTIN) tablet 1 mg, 1 mg, Oral, Q8H PRN, Shannon Her MD    calcium carbonate-vitamin D (OSCAL-D) 500 mg-200 units per tablet 1 tablet, 1 tablet, Oral, Daily With Breakfast, Lilly LAKE PA-C, 1 tablet at 09/30/19 0902    DULoxetine (CYMBALTA) delayed release capsule 60 mg, 60 mg, Oral, Daily, Reggie Paez PA-C, 60 mg at 09/30/19 0902    gabapentin (NEURONTIN) capsule 300 mg, 300 mg, Oral, Daily, Reggie Paez PA-C, 300 mg at 09/30/19 0902    gabapentin (NEURONTIN) capsule 600 mg, 600 mg, Oral, HS, Anita Agee PA-C, 600 mg at 09/30/19 2149    haloperidol (HALDOL) tablet 1 mg, 1 mg, Oral, Q6H PRN, Shannon Her MD    haloperidol lactate (HALDOL) injection 2 mg, 2 mg, Intramuscular, Q6H PRN, Shannon Her MD    hydrOXYzine HCL (ATARAX) tablet 25 mg, 25 mg, Oral, Q6H PRN, Mandeep Nielson MD    lidocaine (LIDODERM) 5 % patch 1 patch, 1 patch, Topical, Daily PRN, Lilly LAKE PA-C    magnesium hydroxide (MILK OF MAGNESIA) 400 mg/5 mL oral suspension 30 mL, 30 mL, Oral, Daily PRN, Mandeep Nielson MD    menthol-methyl salicylate (BENGAY) 11-37 % cream 1 application, 1 application, Apply externally, 4x Daily PRN, Lilly LAKE PA-C    pantoprazole (PROTONIX) EC tablet 40 mg, 40 mg, Oral, Early Morning, Lilly LAKE PA-C, 40 mg at 10/01/19 0546    polyethylene glycol (MIRALAX) packet 17 g, 17 g, Oral, Daily, Malika DilEDUARDO alvarez-C, 17 g at 09/29/19 8404    risperiDONE (RisperDAL M-TABS) dispersible tablet 1 mg, 1 mg, Oral, Q8H PRN, Mandeep Nielson MD    senna-docusate sodium (SENOKOT S) 8 6-50 mg per tablet 2 tablet, 2 tablet, Oral, HS, Malika DilEDUARDO alvarez-FILIPE, 2 tablet at 09/30/19 2150    Current Problem List:    Patient Active Problem List   Diagnosis    [facility] dependence in remission ([facility] [address] [facility]    Allergic rhinitis    Arthropathy of multiple sites    Moderate episode of recurrent major depressive disorder (HCC)    Esophagitis    Irritable bowel syndrome    Raynaud's syndrome without gangrene    Osteopenia    Radiculopathy of lumbar region    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[address]    Focal neurological deficit    GERD (gastroesophageal reflux disease)    Cervical myelopathy with cervical radiculopathy    History of recent intraspinal surgery    Autonomic orthostatic hypotension    History of migraine headaches    Mixed dyslipidemia    Lesion of lachelle    Hypertension    Right sided temporal headache    Fibromyalgia    ELLY (generalized anxiety disorder)    Herniated cervical disc    Melanoma ([address]    Spinal stenosis    BMI 26 0-26 9,adult    Psychogenic weakness    History of cholecystectomy    Ambulatory dysfunction    Other specified hypothyroidism       Problem list reviewed 10/01/19     Objective:     Vital Signs:  Vitals:    09/30/19 9200 09/30/19 1559 09/30/19 2051 10/01/19 0705   BP: 111/76 131/87 145/81 99/68   BP Location: Right arm Right arm Right arm Right arm   Pulse: 89 82 80 92   Resp: 16 16 18 16   Temp: 98 3 °F (36 8 °C) 98 3 °F (36 8 °C) 97 6 °F (36 4 °C) 97 6 °F (36 4 °C)   TempSrc: Temporal Temporal Temporal Temporal   SpO2: 96% 95% 95% 97%   Weight:       Height:             Appearance:  age appropriate and casually dressed   Behavior:  normal   Speech:  normal volume   Mood:  constricted and depressed   Affect:  constricted   Thought Process:  normal   Thought Content:  normal   Perceptual Disturbances: None   Risk Potential: none   Sensorium:  person, place, situation and time   Cognition:  intact   Consciousness:  alert and awake    Attention: attention span and concentration were age appropriate   Intellect: average   Insight:  fair   Judgment: fair      Motor Activity: no abnormal movements       I/O Past 24 hours:  I/O last 3 completed shifts: In: 360 [P O :360]  Out: -   No intake/output data recorded  Labs:  Reviewed 10/01/19    Progress Toward Goals: unchanged    Assessment / Plan: Moderate episode of recurrent major depressive disorder ([facility] [address] )    Recommended Treatment:      Medication changes:  1) Continue current medication regimen  Abilify recently added  Non-pharmacological treatments  1) Continue with group therapy, milieu therapy and occupational therapy  Safety  1) Safety/communication plan established targeting dynamic risk factors above  2) Risks, benefits, and possible side effects of medications explained to patient and patient verbalizes understanding  Counseling / Coordination of Care    Total floor / unit time spent today 20 minutes  Greater than 50% of total time was spent with the patient and / or family counseling and / or coordination of care  A description of the counseling / coordination of care       Patient's Rights, confidentiality and exceptions to confidentiality, use of automated medical record, [address] [place] [address] staff access to medical record, and consent to treatment reviewed      Romain Hawley PA-C

## 2019-10-01 NOTE — PROGRESS NOTES
Pt attended positive coping skills group  Prior to group, pt noted that the last group was "difficult"  Pt mentioned how she is struggling since she was rehospitalization  Pt stated her medical needs became overwhelming and "everything happened so fast"  Pt acknowledged that she felt guilty and that "I thought I was doing everyhting right prior to my admission"  Pt spoke about working on her mh recovery needs and trying to understand them  Pt noted that she has changed her med times and trying to be organized  Continue to provide therapeutic group support  10/01/19 1400   Activity/Group Checklist   Group Other (Comment)  (positive coping)   Attendance Attended   Attendance Duration (min) 46-60   Interactions Interacted appropriately   Affect/Mood Blunted/flat   Goals Achieved Identified feelings; Discussed coping strategies; Able to listen to others; Able to engage in interactions; Able to self-disclose

## 2019-10-01 NOTE — PROGRESS NOTES
10/01/19 0901   Team Meeting   Meeting Type Daily Rounds   Team Members Present   Team Members Present Physician;Nurse;; Other (Discipline and Name)   Physician Team Member Dr Junie Rhodes Team Member M  [address] [place] [address] Management Team Member MIGUEL Osorio   Other (Discipline and Name) RAJWINDER Ledbetter      Reviewed with Team, discussed medications, patient c/o of stomach bothering her, SLIM made aware, OOB and visible  Continue with Abilify patient tolerating well

## 2019-10-01 NOTE — PROGRESS NOTES
10/01/19 1509   Team Meeting   Meeting Type Tx Team Meeting   Initial Conference Date 10/01/19   Next Conference Date 11/01/19   Team Members Present   Team Members Present Physician;Nurse;   Physician Team Member   (Dr Anne Gonsalez)   Nursing Team Member   (Izzy Monzon RN)   Care Management Team Member   Tanya Pantoja OTR/L)   Patient/Family Present   Patient Present Yes   Patient's Family Present No

## 2019-10-01 NOTE — NURSING NOTE
Patient is in bed resting with eyes  Pt is noted to be comfortable and free from pain and discomfort  Pt denies S/I,HI,VH Will continue to monitor pt

## 2019-10-01 NOTE — PROGRESS NOTES
Pt attended  recovery group  Pt blunt and flat affect  Pt needed prompting to participate  Group focused on 10 principles of recovery by Tamiko Gogobeans  Provide therepuetic group support as needed  10/01/19 1100   Activity/Group Checklist   Group Other (Comment)  ( recovery)   Attendance Attended   Attendance Duration (min) 31-45   Interactions Interacted appropriately   Affect/Mood Blunted/flat   Goals Achieved Identified feelings; Discussed coping strategies; Able to listen to others; Able to engage in interactions; Able to reflect/comment on own behavior;Able to manage/cope with feelings;Verbalized increased hopefulness; Able to self-disclose; Able to recieve feedback; Able to give feedback to another;Able to experience relief/decrease in symptoms

## 2019-10-02 RX ADMIN — GABAPENTIN 300 MG: 300 CAPSULE ORAL at 08:26

## 2019-10-02 RX ADMIN — GABAPENTIN 600 MG: 300 CAPSULE ORAL at 21:29

## 2019-10-02 RX ADMIN — DULOXETINE HYDROCHLORIDE 60 MG: 60 CAPSULE, DELAYED RELEASE ORAL at 08:26

## 2019-10-02 RX ADMIN — NORTRIPTYLINE HYDROCHLORIDE 10 MG: 10 CAPSULE ORAL at 21:29

## 2019-10-02 RX ADMIN — PANTOPRAZOLE SODIUM 40 MG: 40 TABLET, DELAYED RELEASE ORAL at 05:45

## 2019-10-02 RX ADMIN — ACETAMINOPHEN 975 MG: 325 TABLET ORAL at 05:44

## 2019-10-02 RX ADMIN — OYSTER SHELL CALCIUM WITH VITAMIN D 1 TABLET: 500; 200 TABLET, FILM COATED ORAL at 08:26

## 2019-10-02 RX ADMIN — ACETAMINOPHEN 975 MG: 325 TABLET ORAL at 21:29

## 2019-10-02 RX ADMIN — ACETAMINOPHEN 975 MG: 325 TABLET ORAL at 13:49

## 2019-10-02 RX ADMIN — ARIPIPRAZOLE 2 MG: 2 TABLET ORAL at 08:25

## 2019-10-02 NOTE — PROGRESS NOTES
Pt attended healthy relationships/commication group  Pt cooperative and pleasant  Pt noted the difference between an unhealthy vs healthy relationship  Pt stated that she is currently in a positive one and she was in negative one in the past   Continue to provide therepuetic group support

## 2019-10-02 NOTE — PROGRESS NOTES
10/02/19 1100   Activity/Group Checklist   Group Other (Comment)  (vitality group-therapeutic password/ effective coping skills)   Attendance Attended   Attendance Duration (min) 31-45   Interactions Interacted appropriately  (initially timid but increasingly participatory )   Affect/Mood Calm   Goals Achieved Able to engage in interactions   Progressively engaged in task when diverted

## 2019-10-02 NOTE — NURSING NOTE
Patient is social with select peers  Does, however, go back to her room and become isolative at times  There was NO c/o abdominal pain  She origionally wanted Tylenol for back pain at dinnertime but did not want Tylenol as "I get it later; before bed, so I would rather have it then    She denies SI

## 2019-10-02 NOTE — NURSING NOTE
States she is sleeping better  Rated pain a 4/10 right mid abdomen  Denies SI's ,depression or anxiety

## 2019-10-02 NOTE — PROGRESS NOTES
Psychiatry Progress Note    Subjective: Interval History     The patient is visible this morning on the unit  She states she did sleep better last night  Nortriptyline was added  She does continue to have some abdominal pain but feels that it is improving  She has been having bowel movements  She is medication compliant and has been attending group activities  She denies any suicidal ideation  She is tolerating the recent addition of Abilify  She offers no other concerns today      Behavior over the last 24 hours:  unchanged  Sleep: normal  Appetite: poor  Medication side effects: No  ROS: no complaints    Current medications:    Current Facility-Administered Medications:     acetaminophen (TYLENOL) tablet 975 mg, 975 mg, Oral, Q8H KIRBY, Lilly LAKE PA-C, 975 mg at 10/02/19 0544    aluminum-magnesium hydroxide-simethicone (MYLANTA) 200-200-20 mg/5 mL oral suspension 15 mL, 15 mL, Oral, Q4H PRN, Josi Au MD    ARIPiprazole (ABILIFY) tablet 2 mg, 2 mg, Oral, Daily, Rajni Dickerson PA-C, 2 mg at 10/01/19 0830    benztropine (COGENTIN) injection 1 mg, 1 mg, Intramuscular, Q8H PRN, Josi Au MD    benztropine (COGENTIN) tablet 1 mg, 1 mg, Oral, Q8H PRN, Josi Au MD    calcium carbonate-vitamin D (OSCAL-D) 500 mg-200 units per tablet 1 tablet, 1 tablet, Oral, Daily With Breakfast, Lilly LAKE PA-C, 1 tablet at 10/01/19 0830    DULoxetine (CYMBALTA) delayed release capsule 60 mg, 60 mg, Oral, Daily, Anita Agee PA-C, 60 mg at 10/01/19 0830    gabapentin (NEURONTIN) capsule 300 mg, 300 mg, Oral, Daily, Anita Agee PA-C, 300 mg at 10/01/19 0830    gabapentin (NEURONTIN) capsule 600 mg, 600 mg, Oral, HS, Anita Agee PA-C, 600 mg at 10/01/19 2154    haloperidol (HALDOL) tablet 1 mg, 1 mg, Oral, Q6H PRN, Josi Au MD    haloperidol lactate (HALDOL) injection 2 mg, 2 mg, Intramuscular, Q6H PRN, Josi Au MD    hydrOXYzine HCL (ATARAX) tablet 25 mg, 25 mg, Oral, Q6H PRN, Doc Knott MD    lidocaine (LIDODERM) 5 % patch 1 patch, 1 patch, Topical, Daily PRN, Lilly LAKE PA-C    magnesium hydroxide (MILK OF MAGNESIA) 400 mg/5 mL oral suspension 30 mL, 30 mL, Oral, Daily PRN, Doc Knott MD    menthol-methyl salicylate (BENGAY) 29-89 % cream 1 application, 1 application, Apply externally, 4x Daily PRN, Lilly LAKE PA-C    nortriptyline (PAMELOR) capsule 10 mg, 10 mg, Oral, HS, Roddy Hernandez MD, 10 mg at 10/01/19 2155    pantoprazole (PROTONIX) EC tablet 40 mg, 40 mg, Oral, Early Morning, Lilly LAKE PA-C, 40 mg at 10/02/19 0545    polyethylene glycol (MIRALAX) packet 17 g, 17 g, Oral, Daily, Loli Rae PA-C, 17 g at 09/29/19 6243    risperiDONE (RisperDAL M-TABS) dispersible tablet 1 mg, 1 mg, Oral, Q8H PRN, Doc Knott MD    senna-docusate sodium (SENOKOT S) 8 6-50 mg per tablet 2 tablet, 2 tablet, Oral, HS, Loli Rae PA-C, 2 tablet at 10/01/19 2155    Current Problem List:    Patient Active Problem List   Diagnosis    [facility] dependence [facility] remission ([facility] [address] )    Allergic rhinitis    Arthropathy of multiple sites    Moderate episode of recurrent major depressive disorder (HCC)    Esophagitis    Irritable bowel syndrome    Raynaud's syndrome without gangrene    Osteopenia    Radiculopathy of lumbar region    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[facility] [address] )    Focal neurological deficit    GERD (gastroesophageal reflux disease)    Cervical myelopathy with cervical radiculopathy    History of recent intraspinal surgery    Autonomic orthostatic hypotension    History of migraine headaches    Mixed dyslipidemia    Lesion of lachelle    Hypertension    Right sided temporal headache    Fibromyalgia    ELLY (generalized anxiety disorder)    Herniated cervical disc    Melanoma ([facility] [address] )    Spinal stenosis    BMI 26 0-26 9,adult    Psychogenic weakness    History of cholecystectomy    Ambulatory dysfunction    Other specified hypothyroidism       Problem list reviewed 10/02/19     Objective:     Vital Signs:  Vitals:    10/01/19 0705 10/01/19 1500 10/01/19 1900 10/02/19 0710   BP: 99/68 114/78 131/79 123/76   BP Location: Right arm Right arm Right arm Right arm   Pulse: 92 93 96 84   Resp: 16 18 18 16   Temp: 97 6 °F (36 4 °C) 98 °F (36 7 °C) 98 °F (36 7 °C) 97 6 °F (36 4 °C)   TempSrc: Temporal Temporal Temporal Temporal   SpO2: 97% 97% 96% 97%   Weight:       Height:             Appearance:  age appropriate and casually dressed   Behavior:  normal   Speech:  normal volume   Mood:  constricted and depressed   Affect:  constricted   Thought Process:  normal   Thought Content:  normal   Perceptual Disturbances: None   Risk Potential: none   Sensorium:  person, place, situation and time   Cognition:  intact   Consciousness:  alert and awake    Attention: attention span and concentration were age appropriate   Intellect: average   Insight:  fair   Judgment: fair      Motor Activity: no abnormal movements       I/O Past 24 hours:  I/O last 3 completed shifts: In: 1260 [P O :1260]  Out: -   No intake/output data recorded  Labs:  Reviewed 10/02/19    Progress Toward Goals: unchanged    Assessment / Plan: Moderate episode of recurrent major depressive disorder ([address] )    Recommended Treatment:      Medication changes:  1) Continue current medication regimen  Nortriptyline added last night  Abilify recently added  Non-pharmacological treatments  1) Continue with group therapy, milieu therapy and occupational therapy  Safety  1) Safety/communication plan established targeting dynamic risk factors above  2) Risks, benefits, and possible side effects of medications explained to patient and patient verbalizes understanding  Counseling / Coordination of Care    Total floor / unit time spent today 20 minutes  Greater than 50% of total time was spent with the patient and / or family counseling and / or coordination of care   A description of the counseling / coordination of care  Patient's Rights, confidentiality and exceptions to confidentiality, use of automated medical record, [facility] staff access to medical record, and consent to treatment reviewed      Jr Oreilly PA-C

## 2019-10-02 NOTE — NURSING NOTE
Patient visible , social   States '' she feels better   Abdominal pain getting better '' patient states '' my pain was in my head its getting cleared ''   Denies suicidal ideation/AH/VH

## 2019-10-02 NOTE — NURSING NOTE
Monitored on safety checks   Currently in bed with eyes closed and even breathing pattern noted, appears to be resting calmly  Not voicing any SI's or complaints

## 2019-10-02 NOTE — PROGRESS NOTES
10/02/19 1000   Team Meeting   Meeting Type Tx Team Meeting   Team Members Present   Team Members Present Physician;Nurse;; Other (Discipline and Name)   Physician Team Member Hom[address] Team Member [facility] Team Member Taylor Birmingham   Other (Discipline and Name) Gial Isbell   Pt reports feeling better, slept, tolerating pamelar

## 2019-10-03 RX ORDER — ARIPIPRAZOLE 2 MG/1
2 TABLET ORAL DAILY
Qty: 30 TABLET | Refills: 0 | Status: SHIPPED | OUTPATIENT
Start: 2019-10-04 | End: 2019-11-26

## 2019-10-03 RX ORDER — GABAPENTIN 300 MG/1
300 CAPSULE ORAL DAILY
Qty: 30 CAPSULE | Refills: 0 | Status: SHIPPED | OUTPATIENT
Start: 2019-10-04 | End: 2020-01-26

## 2019-10-03 RX ORDER — AMOXICILLIN 250 MG
2 CAPSULE ORAL
Qty: 60 TABLET | Refills: 0 | Status: SHIPPED | OUTPATIENT
Start: 2019-10-03 | End: 2019-11-26

## 2019-10-03 RX ORDER — B-COMPLEX WITH VITAMIN C
1 TABLET ORAL
Qty: 30 TABLET | Refills: 0 | Status: SHIPPED | OUTPATIENT
Start: 2019-10-03 | End: 2021-07-20 | Stop reason: HOSPADM

## 2019-10-03 RX ORDER — PANTOPRAZOLE SODIUM 40 MG/1
40 TABLET, DELAYED RELEASE ORAL
Qty: 30 TABLET | Refills: 0 | Status: SHIPPED | OUTPATIENT
Start: 2019-10-04 | End: 2019-11-26

## 2019-10-03 RX ORDER — ACETAMINOPHEN 325 MG/1
975 TABLET ORAL EVERY 8 HOURS PRN
Status: DISCONTINUED | OUTPATIENT
Start: 2019-10-03 | End: 2019-10-04 | Stop reason: HOSPADM

## 2019-10-03 RX ORDER — DULOXETIN HYDROCHLORIDE 60 MG/1
60 CAPSULE, DELAYED RELEASE ORAL DAILY
Qty: 30 CAPSULE | Refills: 0 | Status: SHIPPED | OUTPATIENT
Start: 2019-10-03 | End: 2020-10-05 | Stop reason: SDUPTHER

## 2019-10-03 RX ORDER — NORTRIPTYLINE HYDROCHLORIDE 10 MG/1
10 CAPSULE ORAL
Qty: 30 CAPSULE | Refills: 0 | Status: SHIPPED | OUTPATIENT
Start: 2019-10-03 | End: 2019-11-26

## 2019-10-03 RX ORDER — GABAPENTIN 300 MG/1
600 CAPSULE ORAL
Qty: 60 CAPSULE | Refills: 0 | Status: SHIPPED | OUTPATIENT
Start: 2019-10-03 | End: 2020-10-05 | Stop reason: SDUPTHER

## 2019-10-03 RX ADMIN — DULOXETINE HYDROCHLORIDE 60 MG: 60 CAPSULE, DELAYED RELEASE ORAL at 08:23

## 2019-10-03 RX ADMIN — NORTRIPTYLINE HYDROCHLORIDE 10 MG: 10 CAPSULE ORAL at 21:12

## 2019-10-03 RX ADMIN — OYSTER SHELL CALCIUM WITH VITAMIN D 1 TABLET: 500; 200 TABLET, FILM COATED ORAL at 08:23

## 2019-10-03 RX ADMIN — PANTOPRAZOLE SODIUM 40 MG: 40 TABLET, DELAYED RELEASE ORAL at 06:05

## 2019-10-03 RX ADMIN — SENNOSIDES AND DOCUSATE SODIUM 2 TABLET: 8.6; 5 TABLET ORAL at 21:12

## 2019-10-03 RX ADMIN — GABAPENTIN 300 MG: 300 CAPSULE ORAL at 08:23

## 2019-10-03 RX ADMIN — ARIPIPRAZOLE 2 MG: 2 TABLET ORAL at 08:23

## 2019-10-03 RX ADMIN — ACETAMINOPHEN 975 MG: 325 TABLET ORAL at 22:34

## 2019-10-03 RX ADMIN — GABAPENTIN 600 MG: 300 CAPSULE ORAL at 21:09

## 2019-10-03 NOTE — NURSING NOTE
Patient is superficially pleasant  Isolates back to her room at times  Does not engage with staff or peers much No c/o pain; did not request any PRN medications   She denies SI

## 2019-10-03 NOTE — NURSING NOTE
Monitored on safety checks  Not voicing any SI's  In bed with eyes closed and even respirations noted, appears to be resting calmly

## 2019-10-03 NOTE — PROGRESS NOTES
Pt attended relapse prevention group  Pt was cooperative and able to self reflect  Pt signed  relapse prevention plan  Group focused on warmline, crisis phone and roles, signs and symptoms  Contiue to provide therapeutic group support  10/03/19 1000   Activity/Group Checklist   Group Other (Comment)  (relapse prevention)   Attendance Attended   Attendance Duration (min) 31-45   Interactions Interacted appropriately   Affect/Mood Appropriate   Goals Achieved Identified feelings; Identified relapse prevention strategies; Discussed coping strategies; Able to reflect/comment on own behavior;Able to manage/cope with feelings;Verbalized increased hopefulness; Able to self-disclose; Able to recieve feedback; Able to give feedback to another;Able to experience relief/decrease in symptoms

## 2019-10-03 NOTE — PLAN OF CARE
Problem: Depression  Goal: Treatment Goal: Demonstrate behavioral control of depressive symptoms, verbalize feelings of improved mood/affect, and adopt new coping skills prior to discharge  Outcome: Progressing  Goal: Verbalize thoughts and feelings  Description  Interventions:  - Assess and re-assess patient's level of risk   - Engage patient in 1:1 interactions, daily, for a minimum of 15 minutes   - Encourage patient to express feelings, fears, frustrations, hopes   Outcome: Progressing  Goal: Refrain from harming self  Description  Interventions:  - Monitor patient closely, per order   - Supervise medication ingestion, monitor effects and side effects   Outcome: Progressing  Goal: Refrain from self-neglect  Outcome: Progressing  Goal: Complete daily ADLs, including personal hygiene independently, as able  Description  Interventions:  - Observe, teach, and assist patient with ADLS  -  Monitor and promote a balance of rest/activity, with adequate nutrition and elimination   Outcome: Progressing     Problem: PAIN - ADULT  Goal: Verbalizes/displays adequate comfort level or baseline comfort level  Description  Interventions:  - Encourage patient to monitor pain and request assistance  - Assess pain using appropriate pain scale  - Administer analgesics based on type and severity of pain and evaluate response  - Implement non-pharmacological measures as appropriate and evaluate response  - Consider cultural and social influences on pain and pain management  - Notify physician/advanced practitioner if interventions unsuccessful or patient reports new pain  Outcome: Progressing     Problem: Ineffective Coping  Goal: Participates in unit activities  Description  Interventions:  - Provide therapeutic environment   - Provide required programming   - Redirect inappropriate behaviors   Outcome: Progressing     Problem: DISCHARGE PLANNING  Goal: Discharge to home or other facility with appropriate resources  Description  INTERVENTIONS:  - Identify barriers to discharge w/patient and caregiver  - Arrange for needed discharge resources and transportation as appropriate  - Identify discharge learning needs (meds, wound care, etc )  - Arrange for interpretive services to assist at discharge as needed  - Refer to Case Management Department for coordinating discharge planning if the patient needs post-hospital services based on physician/advanced practitioner order or complex needs related to functional status, cognitive ability, or social support system  Outcome: Progressing     Problem: Depression  Goal: Refrain from isolation  Description  Interventions:  - Develop a trusting relationship   - Encourage socialization   Outcome: Not Progressing

## 2019-10-03 NOTE — PROGRESS NOTES
10/03/19 0901   Team Meeting   Meeting Type Daily Rounds   Team Members Present   Team Members Present Physician;Nurse;; Other (Discipline and Name)   Physician Team Member M  [address] [facility] Team Member M  [address] Team Member MIGUEL Padilla   Other (Discipline and Name) Elizabeth Cotto     Reviewed with Team, Discussed medications, consult placed for swollen finger R/O infection, requested Tylenol PRN vs standing order  Possible D/C Friday

## 2019-10-03 NOTE — NURSING NOTE
Slept well   Refused AM scheduled tylenol , states she doesn't need , has no current pain  Took AM protonix   Denies SI

## 2019-10-03 NOTE — PROGRESS NOTES
Psychiatry Progress Note    Subjective: Interval History     The patient is visible walking around the unit this morning  She states that she is feeling better  She has been eating her meals  She states she has lost 6 lbs since being here and has been having normal bowel movements  She feels that her mood has also improved  She is tolerating the recent addition of Abilify and nortriptyline well  Patient is sleeping well at night  She is denying any suicidal ideation  She is medication compliant  Patient has swollen and red right index finger near the nail  Will consult Podiatry today      Behavior over the last 24 hours:  unchanged  Sleep: normal  Appetite: poor  Medication side effects: No  ROS: no complaints    Current medications:    Current Facility-Administered Medications:     acetaminophen (TYLENOL) tablet 975 mg, 975 mg, Oral, Q8H [address], Lilly LAKE PA-C, 975 mg at 10/02/19 2129    aluminum-magnesium hydroxide-simethicone (MYLANTA) 200-200-20 mg/5 mL oral suspension 15 mL, 15 mL, Oral, Q4H PRN, Yady Flores MD    ARIPiprazole (ABILIFY) tablet 2 mg, 2 mg, Oral, Daily, Antonieta Stewart PA-C, 2 mg at 10/03/19 8483    benztropine (COGENTIN) injection 1 mg, 1 mg, Intramuscular, Q8H PRN, Yady Flores MD    benztropine (COGENTIN) tablet 1 mg, 1 mg, Oral, Q8H PRN, Yady Flores MD    calcium carbonate-vitamin D (OSCAL-D) 500 mg-200 units per tablet 1 tablet, 1 tablet, Oral, Daily With Breakfast, Lilly LAKE PA-C, 1 tablet at 10/03/19 3241    DULoxetine (CYMBALTA) delayed release capsule 60 mg, 60 mg, Oral, Daily, Anita Agee PA-C, 60 mg at 10/03/19 7066    gabapentin (NEURONTIN) capsule 300 mg, 300 mg, Oral, Daily, Antonieta Stewart PA-C, 300 mg at 10/03/19 0651    gabapentin (NEURONTIN) capsule 600 mg, 600 mg, Oral, HS, Anita Agee PA-C, 600 mg at 10/02/19 2129    haloperidol (HALDOL) tablet 1 mg, 1 mg, Oral, Q6H PRN, Yady Flores MD    haloperidol lactate (HALDOL) injection 2 mg, 2 mg, Intramuscular, Q6H PRN, Gertrudis Canales MD    hydrOXYzine HCL (ATARAX) tablet 25 mg, 25 mg, Oral, Q6H PRN, Gertrudis Canales MD    lidocaine (LIDODERM) 5 % patch 1 patch, 1 patch, Topical, Daily PRN, Lilly LAKE PA-C    magnesium hydroxide (MILK OF MAGNESIA) 400 mg/5 mL oral suspension 30 mL, 30 mL, Oral, Daily PRN, Gertrudis Canales MD    menthol-methyl salicylate (BENGAY) 18-45 % cream 1 application, 1 application, Apply externally, 4x Daily PRN, Lilly LAKE PA-C    nortriptyline (PAMELOR) capsule 10 mg, 10 mg, Oral, HS, Yfn Resendez MD, 10 mg at 10/02/19 2129    pantoprazole (PROTONIX) EC tablet 40 mg, 40 mg, Oral, Early Morning, Lilly LAKE PA-C, 40 mg at 10/03/19 0605    polyethylene glycol (MIRALAX) packet 17 g, 17 g, Oral, Daily, Romain Hawley PA-C, 17 g at 09/29/19 5397    risperiDONE (RisperDAL M-TABS) dispersible tablet 1 mg, 1 mg, Oral, Q8H PRN, Gertrudis Canales MD    senna-docusate sodium (SENOKOT S) 8 6-50 mg per tablet 2 tablet, 2 tablet, Oral, HS, Romain Hawley PA-C, 2 tablet at 10/01/19 2155    Current Problem List:    Patient Active Problem List   Diagnosis    [facility] dependence [facility] [address] )    Allergic rhinitis    Arthropathy of multiple sites    Moderate episode of recurrent major depressive disorder ([address] )    Esophagitis    Irritable bowel syndrome    Raynaud's syndrome without gangrene    Osteopenia    Radiculopathy of lumbar region    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[address] )    Focal neurological deficit    GERD (gastroesophageal reflux disease)    Cervical myelopathy with cervical radiculopathy    History of recent intraspinal surgery    Autonomic orthostatic hypotension    History of migraine headaches    Mixed dyslipidemia    Lesion of lachelle    Hypertension    Right sided temporal headache    Fibromyalgia    ELLY (generalized anxiety disorder)    Herniated cervical disc    Melanoma ([address] )    Spinal stenosis    BMI 26 0-26 9,adult    Psychogenic weakness    History of cholecystectomy    Ambulatory dysfunction    Other specified hypothyroidism       Problem list reviewed 10/03/19     Objective:     Vital Signs:  Vitals:    10/02/19 0710 10/02/19 1530 10/02/19 2030 10/03/19 0642   BP: 123/76 125/72 130/83 136/73   BP Location: Right arm Left arm Right arm Right arm   Pulse: 84 96 85 79   Resp: 16 18 18 18   Temp: 97 6 °F (36 4 °C) 98 2 °F (36 8 °C) 98 5 °F (36 9 °C) (!) 97 4 °F (36 3 °C)   TempSrc: Temporal Temporal Temporal Temporal   SpO2: 97% 97% 96% 94%   Weight:       Height:             Appearance:  age appropriate and casually dressed   Behavior:  normal   Speech:  normal volume   Mood:  constricted and depressed   Affect:  constricted   Thought Process:  normal   Thought Content:  normal   Perceptual Disturbances: None   Risk Potential: none   Sensorium:  person, place, situation and time   Cognition:  intact   Consciousness:  alert and awake    Attention: attention span and concentration were age appropriate   Intellect: average   Insight:  fair   Judgment: fair      Motor Activity: no abnormal movements       I/O Past 24 hours:  I/O last 3 completed shifts: In: 1380 [P O :1380]  Out: -   I/O this shift:  In: 300 [P O :300]  Out: -         Labs:  Reviewed 10/03/19    Progress Toward Goals: unchanged    Assessment / Plan: Moderate episode of recurrent major depressive disorder ([facility] [address] )    Recommended Treatment:      Medication changes:  1) Continue current medication regimen  Non-pharmacological treatments  1) Continue with group therapy, milieu therapy and occupational therapy  Safety  1) Safety/communication plan established targeting dynamic risk factors above  2) Risks, benefits, and possible side effects of medications explained to patient and patient verbalizes understanding  Counseling / Coordination of Care    Total floor / unit time spent today 20 minutes   Greater than 50% of total time was spent with the patient and / or family counseling and / or coordination of care  A description of the counseling / coordination of care  Patient's Rights, confidentiality and exceptions to confidentiality, use of automated medical record, [facility] staff access to medical record, and consent to treatment reviewed      Marin Cifuentes PA-C

## 2019-10-03 NOTE — PROGRESS NOTES
10/03/19 1300   Service   Service SLIM   Provider Name DR Kerri Genao   Multi-disciplinary Rounds   Diagnosis  Reviewed   Skin/Wounds Reviewed   Reviewed  and assess finger nails  patient  C/o pain

## 2019-10-03 NOTE — PROGRESS NOTES
10/03/19 1100 10/03/19 1430   Activity/Group Checklist   Group Exercise  (hand jive) Pet therapy   Attendance Attended Attended   Attendance Duration (min) 16-30 0-15   Interactions Interacted appropriately Interacted appropriately   Affect/Mood Appropriate;Normal range  --    Goals Achieved Able to engage in interactions  --

## 2019-10-03 NOTE — CASE MANAGEMENT
Pt is looking forward to getting discharged ton her home on 10/4  She did express desire to have a therapist and was able to give an agency name in [address] [place] [address]  Near her home  I have called [facility] and pt is now set up to see Dr Keshav Augustin on 10/15  She is pleased and will contact her family to arrange transport

## 2019-10-03 NOTE — NURSING NOTE
Patient is anticipating discharge tomorrow  She could not, however, state many new coping mechanisms that she could use after discharge  She remains guarded; less "bright" on approach  Seems more superficial  She denies SI  C/O pain (back) Tylenol 975 mg as prn for pain   ([id number])  Will monitor

## 2019-10-03 NOTE — NURSING NOTE
Patient visible in the unit   Social with peers  Feeling better   Depression and anxiety improved  Denies pain /SI/VH/AH

## 2019-10-04 VITALS
SYSTOLIC BLOOD PRESSURE: 105 MMHG | DIASTOLIC BLOOD PRESSURE: 67 MMHG | BODY MASS INDEX: 26.91 KG/M2 | TEMPERATURE: 98 F | RESPIRATION RATE: 20 BRPM | OXYGEN SATURATION: 97 % | WEIGHT: 157.6 LBS | HEART RATE: 96 BPM | HEIGHT: 64 IN

## 2019-10-04 RX ADMIN — OYSTER SHELL CALCIUM WITH VITAMIN D 1 TABLET: 500; 200 TABLET, FILM COATED ORAL at 08:22

## 2019-10-04 RX ADMIN — PANTOPRAZOLE SODIUM 40 MG: 40 TABLET, DELAYED RELEASE ORAL at 05:51

## 2019-10-04 RX ADMIN — ARIPIPRAZOLE 2 MG: 2 TABLET ORAL at 08:22

## 2019-10-04 RX ADMIN — GABAPENTIN 300 MG: 300 CAPSULE ORAL at 08:22

## 2019-10-04 RX ADMIN — DULOXETINE HYDROCHLORIDE 60 MG: 60 CAPSULE, DELAYED RELEASE ORAL at 08:22

## 2019-10-04 NOTE — NURSING NOTE
States she slept maybe 3 hours through the night  Positive attitude about going home  Talking about journalling to reduce stress  Denies SI's

## 2019-10-04 NOTE — BH TRANSITION RECORD
Contact Information: If you have any questions, concerns, pended studies, tests and/or procedures, or emergencies regarding your inpatient behavioral health visit  Please contact [facility] older adult behavioral health unit 6B [phone] and ask to speak to a , nurse or physician  A contact is available 24 hours/ 7 days a week at this number  Summary of Procedures Performed During your Stay:  Below is a list of major procedures performed during your hospital stay and a summary of results:  - No major procedures performed  Pending Studies (From admission, onward)    None        If studies are pending at discharge, follow up with your PCP and/or referring provider

## 2019-10-04 NOTE — DISCHARGE SUMMARY
Psychiatric Discharge Summary    Medical Record Number: [id number]  Encounter: [id number]    Discharge diagnosis:  Moderate episode of recurrent major depressive disorder ([facility] )    Secondary diagnoses:  Problem List     * (Principal) Moderate episode of recurrent major depressive disorder ([facility] )    Overview Deleted 3/3/2018 12:42 PM by Jerene Meigs, CRNP            Alcohol dependence in remission ([place] [address] ) (Chronic)    Allergic rhinitis (Chronic)    Arthropathy of multiple sites    Overview Signed 2/5/2018  2:38 PM by Sky Alvarado MD     Procedure/Onset: 07/11/2006         Esophagitis    Overview Signed 2/5/2018  2:38 PM by Sky Alvarado MD     Procedure/Onset: 07/11/2006         Irritable bowel syndrome (Chronic)    Overview Deleted 3/3/2018 12:43 PM by Jerene Meigs, CRNP            Raynaud's syndrome without gangrene    Overview Signed 2/5/2018  2:38 PM by Sky Alvarado MD     Procedure/Onset: 01/24/2006         Osteopenia (Chronic)    Radiculopathy of lumbar region (Chronic)    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[facility] )    Focal neurological deficit    GERD (gastroesophageal reflux disease)    Cervical myelopathy with cervical radiculopathy    History of recent intraspinal surgery    Autonomic orthostatic hypotension    History of migraine headaches    Mixed dyslipidemia    Lesion of lachelle    Hypertension    Right sided temporal headache    Fibromyalgia    ELLY (generalized anxiety disorder)    Herniated cervical disc    Overview Signed 4/11/2019 12:56 PM by Sky Alvarado MD     Last Assessment & Plan:    This is a 59 y o  right-handed female s/p ACDF C3-4, C$-5 POD #2  -medical management per medicine team  -neuro checks per protocol  -PT/OT->rehab  -physiatry ordered-suggest rehab placement  -bilateral lower extremity dopplers-await report  -follow exam  -pain control  -dvt prophylaxis-heparin SQ         Melanoma ([place] [address] )    Spinal stenosis    BMI 26 0-26 9,adult    Psychogenic weakness    History of cholecystectomy    Ambulatory dysfunction    Other specified hypothyroidism          HPI:     Ezio Cordero is an 62 y o , female, admitted to the psychiatric unit under a 201 status to Dr Aissatou Duong' service with the chief complaint of  increased depression and suicidal thoughts  She said that she was becoming quite preoccupied and distraught about her abdominal pain including nausea which she was not the being helped by medical treatment  She said she felt so bad because of her pain that she reconsider considered killing herself she was treated and evaluated at the medical floor at the [facility] and then because of her depression suicidal threats [facility]      Patient had recent discharge in treatment for this very condition and was actually discharged a week ago from this very unit  She was discharged on Cymbalta 60 mg and Abilify 2 mg which she said she was taking      During the interview and was letting the staff know she was not having significant significant bowel movements and felt constipated and chronically in pain  He was also having some possible a packet issues for which she was being evaluated by her spinal surgeon    Brief Hospital Course:     After the patient was admitted to the psychiatric unit  the patient had several psychotropic medication adjustments made to help stabilize their mood  Following these medication changes, the patient started to stabilize  Finally on 10/4/2019, the patient was found to be stable for discharge  On the day of discharge the patient reported their symptoms as significantly improved  All psychiatric medications were being tolerated without side effect or adverse event  No suicidal or homicidal ideations were present  The patient expressed their readiness and willingness to be discharged and referral to outpatient treatment was made    The case was discussed with Dr Aissatou Duong and he has deemed the patient stable for discharge        Condition on discharge:     Improved    Medications Upon Discharge:       Current Facility-Administered Medications:     acetaminophen (TYLENOL) tablet 975 mg, 975 mg, Oral, Q8H PRN, Michelle Quinonez PA-C, 975 mg at 10/03/19 2234    aluminum-magnesium hydroxide-simethicone (MYLANTA) 200-200-20 mg/5 mL oral suspension 15 mL, 15 mL, Oral, Q4H PRN, Robyn Meyer MD    ARIPiprazole (ABILIFY) tablet 2 mg, 2 mg, Oral, Daily, Michelle Quinonez PA-C, 2 mg at 10/03/19 4883    benztropine (COGENTIN) injection 1 mg, 1 mg, Intramuscular, Q8H PRN, Robyn Meyer MD    benztropine (COGENTIN) tablet 1 mg, 1 mg, Oral, Q8H PRN, Robyn Meyer MD    calcium carbonate-vitamin D (OSCAL-D) 500 mg-200 units per tablet 1 tablet, 1 tablet, Oral, Daily With Breakfast, Lilly LAKE PA-C, 1 tablet at 10/03/19 6133    DULoxetine (CYMBALTA) delayed release capsule 60 mg, 60 mg, Oral, Daily, Michelle Quinonez PA-C, 60 mg at 10/03/19 0293    gabapentin (NEURONTIN) capsule 300 mg, 300 mg, Oral, Daily, Michelle Quinonez PA-C, 300 mg at 10/03/19 8848    gabapentin (NEURONTIN) capsule 600 mg, 600 mg, Oral, HS, Anita Agee PA-C, 600 mg at 10/03/19 2109    haloperidol (HALDOL) tablet 1 mg, 1 mg, Oral, Q6H PRN, Robyn Meyer MD    haloperidol lactate (HALDOL) injection 2 mg, 2 mg, Intramuscular, Q6H PRN, Robyn Meyer MD    hydrOXYzine HCL (ATARAX) tablet 25 mg, 25 mg, Oral, Q6H PRN, Robyn Meyer MD    lidocaine (LIDODERM) 5 % patch 1 patch, 1 patch, Topical, Daily PRN, Lilly LAKE PA-C    magnesium hydroxide (MILK OF MAGNESIA) 400 mg/5 mL oral suspension 30 mL, 30 mL, Oral, Daily PRN, Robyn Meyer MD    menthol-methyl salicylate (BENGAY) 40-30 % cream 1 application, 1 application, Apply externally, 4x Daily PRN, Aguila LAKE PA-C    nortriptyline (PAMELOR) capsule 10 mg, 10 mg, Oral, HS, Ana Adams MD, 10 mg at 10/03/19 2112    pantoprazole (PROTONIX) EC tablet 40 mg, 40 mg, Oral, Early Morning, Lilly LAKE PA-C, 40 mg at 10/04/19 0551    risperiDONE (RisperDAL M-TABS) dispersible tablet 1 mg, 1 mg, Oral, Q8H PRN, Dar Valencia MD    senna-docusate sodium Keltta November S) 8 6-50 mg per tablet 2 tablet, 2 tablet, Oral, HS, Shayy Ga PA-C, 2 tablet at 10/03/19 2112    JEREMÍAS MontoyaC

## 2019-10-04 NOTE — NURSING NOTE
Awake and walking in hoang at start of shift , unable to fall asleep , thinking about going home  Returned to bed , currently in bed appears to be resting with eyes closed and respirations noted  Denies SI's   Monitored on safety checks

## 2019-10-04 NOTE — PROGRESS NOTES
Pt  Stated gratefulness for unit support and readiness for today's discharge  10/04/19 1100   Activity/Group Checklist   Group Other (Comment)  (vitality group,match game on happiness )   Attendance Attended   Attendance Duration (min) 31-45   Interactions Interacted appropriately   Affect/Mood Appropriate;Bright;Normal range   Goals Achieved Able to engage in interactions; Discussed coping strategies; Identified feelings

## 2019-10-04 NOTE — PROGRESS NOTES
10/04/19 0902   Team Meeting   Meeting Type Daily Rounds   Team Members Present   Team Members Present Physician;Nurse;; Other (Discipline and Name); Occupational Therapist   Physician Team Member Dr Beulah Savage Team Member [address] Management Team Member MIGUEL Castillo   OT Team Member K Mauricio Nyhan   Other (Discipline and Name) Leonidas Reynoso      Reviewed with Team, discussed medications, plan is for Discharge today at 11am

## 2019-11-22 ENCOUNTER — TELEPHONE (OUTPATIENT)
Dept: FAMILY MEDICINE CLINIC | Facility: CLINIC | Age: 64
End: 2019-11-22

## 2019-11-22 NOTE — TELEPHONE ENCOUNTER
Sabine Brea form [facility]     Start of care order  PT for home health aide    [address]  THEY NEED THIS SIGNED IN ORDER TO SEE HER THIS WEEKEND    FAXING AGAIN THIS MORNINGform

## 2019-11-26 ENCOUNTER — OFFICE VISIT (OUTPATIENT)
Dept: FAMILY MEDICINE CLINIC | Facility: CLINIC | Age: 64
End: 2019-11-26
Payer: COMMERCIAL

## 2019-11-26 VITALS
BODY MASS INDEX: 28 KG/M2 | HEIGHT: 64 IN | DIASTOLIC BLOOD PRESSURE: 60 MMHG | RESPIRATION RATE: 16 BRPM | SYSTOLIC BLOOD PRESSURE: 110 MMHG | HEART RATE: 89 BPM | WEIGHT: 164 LBS | OXYGEN SATURATION: 94 % | TEMPERATURE: 98.9 F

## 2019-11-26 DIAGNOSIS — I95.1 AUTONOMIC ORTHOSTATIC HYPOTENSION: Primary | ICD-10-CM

## 2019-11-26 DIAGNOSIS — F33.1 MODERATE EPISODE OF RECURRENT MAJOR DEPRESSIVE DISORDER (HCC): ICD-10-CM

## 2019-11-26 DIAGNOSIS — E03.8 OTHER SPECIFIED HYPOTHYROIDISM: ICD-10-CM

## 2019-11-26 DIAGNOSIS — K21.9 GASTROESOPHAGEAL REFLUX DISEASE WITHOUT ESOPHAGITIS: ICD-10-CM

## 2019-11-26 DIAGNOSIS — F10.21 ALCOHOL DEPENDENCE IN REMISSION (HCC): Chronic | ICD-10-CM

## 2019-11-26 PROCEDURE — 1036F TOBACCO NON-USER: CPT | Performed by: NURSE PRACTITIONER

## 2019-11-26 PROCEDURE — 99214 OFFICE O/P EST MOD 30 MIN: CPT | Performed by: NURSE PRACTITIONER

## 2019-11-26 RX ORDER — QUETIAPINE FUMARATE 50 MG/1
50 TABLET, FILM COATED ORAL
COMMUNITY
End: 2020-07-06

## 2019-11-26 RX ORDER — MECLIZINE HYDROCHLORIDE 25 MG/1
TABLET ORAL
Refills: 0 | COMMUNITY
Start: 2019-11-21 | End: 2021-06-30

## 2019-11-26 RX ORDER — OMEPRAZOLE 40 MG/1
40 CAPSULE, DELAYED RELEASE ORAL
Qty: 30 CAPSULE | Refills: 1 | Status: SHIPPED | OUTPATIENT
Start: 2019-11-26 | End: 2020-01-26

## 2019-11-26 RX ORDER — LEVOTHYROXINE SODIUM 0.05 MG/1
50 TABLET ORAL DAILY
COMMUNITY
End: 2019-11-26 | Stop reason: SDUPTHER

## 2019-11-26 RX ORDER — MIDODRINE HYDROCHLORIDE 10 MG/1
10 TABLET ORAL 2 TIMES DAILY
Status: ON HOLD | COMMUNITY
End: 2020-03-04 | Stop reason: SDUPTHER

## 2019-11-26 RX ORDER — LEVOTHYROXINE SODIUM 0.05 MG/1
50 TABLET ORAL DAILY
Qty: 30 TABLET | Refills: 3 | Status: SHIPPED | OUTPATIENT
Start: 2019-11-26 | End: 2020-04-25

## 2019-11-26 RX ORDER — ATORVASTATIN CALCIUM 20 MG/1
20 TABLET, FILM COATED ORAL DAILY
Refills: 0 | COMMUNITY
Start: 2019-11-21 | End: 2021-09-13 | Stop reason: SDUPTHER

## 2019-11-26 RX ORDER — TRAZODONE HYDROCHLORIDE 50 MG/1
50 TABLET ORAL
COMMUNITY
End: 2020-07-06

## 2019-11-26 RX ORDER — DULOXETIN HYDROCHLORIDE 30 MG/1
60 CAPSULE, DELAYED RELEASE ORAL
Refills: 0 | COMMUNITY
Start: 2019-11-13 | End: 2020-01-26

## 2019-11-26 NOTE — PROGRESS NOTES
Assessment/Plan:    1  Autonomic orthostatic hypotension  Assessment & Plan:  Managed by cardiology  She has f/u in 2 weeks  2  Other specified hypothyroidism  Assessment & Plan:  Stable on Levothyroxine  Continue current dose    Orders:  -     levothyroxine 50 mcg tablet; Take 1 tablet (50 mcg total) by mouth daily    3  Moderate episode of recurrent major depressive disorder [facility])  Assessment & Plan:  Currently stable  She plans to establish with a new psychiatrist      4  Gastroesophageal reflux disease without esophagitis  Assessment & Plan:  Omeprazole renewed  She has an appt with GI    Orders:  -     omeprazole (PriLOSEC) 40 MG capsule; Take 1 capsule (40 mg total) by mouth daily before breakfast    5  Alcohol dependence in remission [facility])  Assessment & Plan:  She has been sober for over 20 years  There are no Patient Instructions on file for this visit  Return in about 2 months (around 1/26/2020)  Subjective:      Patient ID: Jenny Vila is a 62 y o  female  Chief Complaint   Patient presents with    Follow-up     post ER visit/hospitalization for dizziness on 11/18/2019  noe        Here today for hospital f/u  She has been hospitalized 3 times for mental health over the past few months  She is doing an outpatient program   She became light headed after leaving her program last week and felt like she was going to pass out  She was evaluated in the ER and was found to have orthostatic hypotension  This also happened after her C spine surgery earlier in the year  She was on Midodrine at that time, which was since discontinued  She had a full cardiac workup during most recent hospitalization with nuclear stress test   She is now on Lipitor  She saw Dr Brennon Watson yesterday and Midodrine was increased  She is still having episodes of light headedness  She goes back for f/u in 2 weeks      She plans on waiting before resuming her partial program  Sees her counselor, Destiney Ribera next week  She does not have anyone to manage her psychiatric medications  Plans to call [facility] for an appt, but states she will go back to [facility] if needed  She has VNA coming into the home because of her dizzy spells  Will be starting PT as well for her back pain  Requesting refill on Omeprazole  She will be seeing GI in 2 weeks  The following portions of the patient's history were reviewed and updated as appropriate: allergies, current medications, past family history, past medical history, past social history, past surgical history and problem list     Review of Systems   Constitutional: Negative for chills, fatigue and fever  Respiratory: Negative for cough, shortness of breath and wheezing  Cardiovascular: Negative for chest pain, palpitations and leg swelling  Gastrointestinal: Negative for abdominal pain, diarrhea, nausea and vomiting  Skin: Negative for rash  Neurological: Positive for dizziness and light-headedness  Negative for headaches     Psychiatric/Behavioral:        See HPI         Current Outpatient Medications   Medication Sig Dispense Refill    atorvastatin (LIPITOR) 20 mg tablet Take 40 mg by mouth daily  0    calcium carbonate-vitamin D (OSCAL-D) 500 mg-200 units per tablet Take 1 tablet by mouth daily with breakfast 30 tablet 0    DULoxetine (CYMBALTA) 30 mg delayed release capsule   0    DULoxetine (CYMBALTA) 60 mg delayed release capsule Take 1 capsule (60 mg total) by mouth daily 30 capsule 0    gabapentin (NEURONTIN) 300 mg capsule Take 1 capsule (300 mg total) by mouth daily 30 capsule 0    gabapentin (NEURONTIN) 300 mg capsule Take 2 capsules (600 mg total) by mouth daily at bedtime 60 capsule 0    levothyroxine 50 mcg tablet Take 1 tablet (50 mcg total) by mouth daily 30 tablet 3    meclizine (ANTIVERT) 25 mg tablet take 1 tablet by mouth every 6 hours if needed  IF DIZZINESS  0    midodrine (PROAMATINE) 10 MG tablet Take 10 mg by mouth 2 (two) times a day      QUEtiapine (SEROquel) 50 mg tablet Take 50 mg by mouth daily at bedtime      traZODone (DESYREL) 50 mg tablet Take 50 mg by mouth daily at bedtime as needed for sleep      omeprazole (PriLOSEC) 40 MG capsule Take 1 capsule (40 mg total) by mouth daily before breakfast 30 capsule 1     No current facility-administered medications for this visit  Objective:    /60   Pulse 89   Temp 98 9 °F (37 2 °C)   Resp 16   Ht 5' 4" (1 626 m)   Wt 74 4 kg (164 lb)   SpO2 94%   BMI 28 15 kg/m²        Physical Exam   Constitutional: She appears well-developed and well-nourished  HENT:   Head: Normocephalic and atraumatic  Right Ear: Tympanic membrane, external ear and ear canal normal    Left Ear: Tympanic membrane, external ear and ear canal normal    Nose: No mucosal edema or rhinorrhea  Mouth/Throat: Uvula is midline, oropharynx is clear and moist and mucous membranes are normal    Eyes: Conjunctivae are normal    Neck: Neck supple  No edema present  No thyromegaly present  Cardiovascular: Normal rate, regular rhythm, normal heart sounds and intact distal pulses  No murmur heard  Pulmonary/Chest: Effort normal and breath sounds normal    Abdominal: Bowel sounds are normal  She exhibits no distension  There is no splenomegaly or hepatomegaly  There is no tenderness  Lymphadenopathy:        Right cervical: No superficial cervical adenopathy present  Left cervical: No superficial cervical adenopathy present  Skin: Skin is warm, dry and intact  No rash noted  Psychiatric: She has a normal mood and affect  Nursing note and vitals reviewed               David Kenney

## 2019-12-11 ENCOUNTER — OFFICE VISIT (OUTPATIENT)
Dept: GASTROENTEROLOGY | Facility: CLINIC | Age: 64
End: 2019-12-11
Payer: COMMERCIAL

## 2019-12-11 VITALS
BODY MASS INDEX: 28.68 KG/M2 | RESPIRATION RATE: 18 BRPM | DIASTOLIC BLOOD PRESSURE: 80 MMHG | WEIGHT: 168 LBS | SYSTOLIC BLOOD PRESSURE: 118 MMHG | TEMPERATURE: 99 F | HEIGHT: 64 IN | HEART RATE: 78 BPM

## 2019-12-11 DIAGNOSIS — K21.9 GASTROESOPHAGEAL REFLUX DISEASE, ESOPHAGITIS PRESENCE NOT SPECIFIED: ICD-10-CM

## 2019-12-11 DIAGNOSIS — K59.00 CONSTIPATION, UNSPECIFIED CONSTIPATION TYPE: ICD-10-CM

## 2019-12-11 DIAGNOSIS — R10.33 PERIUMBILICAL ABDOMINAL PAIN: ICD-10-CM

## 2019-12-11 DIAGNOSIS — K83.8 DILATED CBD, ACQUIRED: Primary | ICD-10-CM

## 2019-12-11 PROCEDURE — 99244 OFF/OP CNSLTJ NEW/EST MOD 40: CPT | Performed by: INTERNAL MEDICINE

## 2019-12-11 NOTE — PROGRESS NOTES
Consultation - [facility]  Danae Calhoun 62 y o  female MRN: [id number]  Unit/Bed#:  Encounter: [id number]        Consults    ASSESSMENT/PLAN:     1  Periumbilical/Right-sided abdominal pain-likely secondary to adhesions however does have constipation at baseline therefore this may be secondary to constipation as well   -recommended high-fiber diet  Avoid gassy foods  -recommend prune juice, increase water intake to 64 oz daily  -patient is up-to-date with colonoscopy in 2017, was notable for several polyps and was recommended a repeat at 5 year interval   Given no significant change in bowel habits, will proceed with these recommendations  2  Dilated common bile duct-suspect this is likely post cholecystectomy state however given right-sided abdominal pain, dilated common bile duct, would recommend MRI/MRCP to rule out any distal biliary strictures  -LFTs on the most recent labs were unremarkable   -avoid NSAIDs  3  GERD/dysphagia-symptoms are well controlled with PPI  Patient has recently undergone dilation in May with improvement of symptoms  -will continue omeprazole 40 mg at this time   -obtain results of the previous EGD   - Patient was explained about the lifestyle and dietary modifications  Advised to avoid fatty foods, chocolates, caffeine, alcohol and any other triggering foods  Avoid eating for at least 3 hours before going to bed  5  Labs in the emergency room were notable for normal liver function tests, normal electrolytes, renal function was normal, CBC was normal, platelets were 056, hemoglobin was 12 1   TSH was normal   _________________________________________________________________    Reason for Consult / Principal Problem: [unfilled]    HPI: Danae Calhoun is a 62y o  year old female with history of hypothyroidism, hypotension, fibromyalgia, depression, history of GERD/dysphagia, currently on omeprazole 40 mg daily, presents to establish care  Patient reports that she was in emergency room in September with right-sided abdominal pain and nausea  Patient reports the pain was periumbilical radiating to the right side  She underwent CT of abdomen and pelvis with contrast which was notable for dilated common bile duct along with cholecystectomy  There are no other acute intra-abdominal processes  Abdominal aorta and branches were patent  No retroperitoneal lymphadenopathy was noted  Patient does have history of abdominal surgeries including cholecystectomy, appendectomy, states that she has had lysis of adhesion in the past   Patient reports that the right-sided abdominal pain has subsided but does periodically come back and is localized to 1 area  She denies any relation to food intake  No association with bowel movements  She does report constipation at baseline for which she takes MiraLax on as-needed basis  Patient underwent colonoscopy in 2017 by Dr Amos Lee which was notable for 2 polyps and sigmoid diverticulosis  She was recommended a repeat at 5 year interval     She also reports undergoing EGD in May of 2019 for esophageal dilation  Patient reports that as far as symptoms of dysphagia concerned they are very well controlled at this time, denies any acid reflux symptoms, nausea, vomiting or epigastric abdominal pain  Review of Systems: The remainder of the review of systems was negative except for the pertinent positives noted in HPI       Historical Information   Past Medical History:   Diagnosis Date    Abdominal adhesions     Anesthesia complication     difficult to wake up    Depression     Disease of thyroid gland     Fibromyalgia     GERD (gastroesophageal reflux disease)     Lazy eye     resolved: 3/27/17    Osteopenia     with joint pain-elevated DEV    Psychiatric disorder     Tinnitus     Wears glasses     for reading     Past Surgical History:   Procedure Laterality Date    ABDOMINAL SURGERY      lysis of adhesions x 2    APPENDECTOMY      CERVICAL FUSION      CHOLECYSTECTOMY      open    DILATION AND CURETTAGE OF UTERUS      NEUROMA EXCISION Right 5/26/2017    Procedure: EXCISION MASS / FIBROMA FOOT;  Surgeon: Lit Paredes DPM;  Location: [facility];  Service:     RIGHT OOPHORECTOMY      WISDOM TOOTH EXTRACTION      x4     Social History   Social History     Substance and Sexual Activity   Alcohol Use Not Currently    Frequency: Never    Binge frequency: Never     Social History     Substance and Sexual Activity   Drug Use No     Social History     Tobacco Use   Smoking Status Never Smoker   Smokeless Tobacco Never Used     Family History   Problem Relation Age of Onset    Heart disease Mother     Stroke Mother 58    COPD Mother     Arthritis Mother     Hypertension Father     Kidney disease Brother         kidney transplant    No Known Problems Sister     No Known Problems Maternal Aunt     No Known Problems Maternal Uncle     No Known Problems Paternal Aunt     No Known Problems Paternal Uncle     No Known Problems Maternal Grandmother     No Known Problems Maternal Grandfather     No Known Problems Paternal Grandmother     No Known Problems Paternal Grandfather     ADD / ADHD Neg Hx     Anesthesia problems Neg Hx     Cancer Neg Hx     Clotting disorder Neg Hx     Collagen disease Neg Hx     Diabetes Neg Hx     Dislocations Neg Hx     Learning disabilities Neg Hx     Neurological problems Neg Hx     Osteoporosis Neg Hx     Rheumatologic disease Neg Hx     Scoliosis Neg Hx     Vascular Disease Neg Hx        Meds/Allergies       (Not in a hospital admission)  No current facility-administered medications for this visit          Allergies   Allergen Reactions    Demerol [Meperidine] Lightheadedness     Reaction Date: 11Jan2006;     Sulfa Antibiotics Hives     Reaction Date: 11Jan2006;        Objective     Blood pressure 118/80, pulse 78, temperature 99 °F (37 2 °C), temperature source Tympanic, resp  rate 18, height 5' 4" (1 626 m), weight 76 2 kg (168 lb)  [unfilled]    PHYSICAL EXAM     GEN: well nourished, well developed, no acute distress  HEENT: anicteric, MMM, no cervical or supraclavicular lymphadenopathy  CV: RRR, no m/r/g  CHEST: CTA b/l, no WRR  ABD: +BS, soft, NT/ND, no hepatosplenomegaly  EXT: no c/c/e  SKIN: no rashes,  NEURO: aaox3    Lab Results:   No visits with results within 1 Day(s) from this visit     Latest known visit with results is:   Admission on 09/28/2019, Discharged on 09/28/2019   Component Date Value    WBC 09/28/2019 5 68     RBC 09/28/2019 4 37     Hemoglobin 09/28/2019 12 1     Hematocrit 09/28/2019 37 8     MCV 09/28/2019 87     MCH 09/28/2019 27 7     MCHC 09/28/2019 32 0     RDW 09/28/2019 13 8     MPV 09/28/2019 10 2     Platelets 19/58/1235 183     nRBC 09/28/2019 0     Neutrophils Relative 09/28/2019 66     Immat GRANS % 09/28/2019 0     Lymphocytes Relative 09/28/2019 19     Monocytes Relative 09/28/2019 9     Eosinophils Relative 09/28/2019 5     Basophils Relative 09/28/2019 1     Neutrophils Absolute 09/28/2019 3 72     Immature Grans Absolute 09/28/2019 0 02     Lymphocytes Absolute 09/28/2019 1 08     Monocytes Absolute 09/28/2019 0 53     Eosinophils Absolute 09/28/2019 0 30     Basophils Absolute 09/28/2019 0 03     Sodium 09/28/2019 139     Potassium 09/28/2019 4 1     Chloride 09/28/2019 103     CO2 09/28/2019 27     ANION GAP 09/28/2019 9     BUN 09/28/2019 14     Creatinine 09/28/2019 1 03     Glucose 09/28/2019 119     Calcium 09/28/2019 8 9     AST 09/28/2019 20     ALT 09/28/2019 28     Alkaline Phosphatase 09/28/2019 71     Total Protein 09/28/2019 7 0     Albumin 09/28/2019 3 4*    Total Bilirubin 09/28/2019 0 30     eGFR 09/28/2019 58     LACTIC ACID 09/28/2019 2 2*    LACTIC ACID 09/28/2019 1 8     Amph/Meth UR 09/28/2019 Negative     Barbiturate Ur 09/28/2019 Negative     Benzodiazepine Urine 09/28/2019 Negative     Cocaine Urine 09/28/2019 Negative     Methadone Urine 09/28/2019 Negative     Opiate Urine 09/28/2019 Positive*    PCP Ur 09/28/2019 Negative     THC Urine 09/28/2019 Negative     EXTBreath Alcohol 09/28/2019 0 000     TSH 3RD GENERATON 09/28/2019 2 575     Color, UA 09/28/2019 Light Yellow     Clarity, UA 09/28/2019 Clear     Specific [place], UA 09/28/2019 <=1 005     pH, UA 09/28/2019 7 0     Leukocytes, UA 09/28/2019 Moderate*    Nitrite, UA 09/28/2019 Negative     Protein, UA 09/28/2019 Negative     Glucose, UA 09/28/2019 Negative     Ketones, UA 09/28/2019 Negative     Urobilinogen, UA 09/28/2019 0 2     Bilirubin, UA 09/28/2019 Negative     Blood, UA 09/28/2019 Trace-Intact*    RBC, UA 09/28/2019 None Seen     WBC, UA 09/28/2019 2-4*    Epithelial Cells 09/28/2019 Occasional     Bacteria, UA 09/28/2019 Moderate*    Ventricular Rate 09/28/2019 73     Atrial Rate 09/28/2019 73     CO Interval 09/28/2019 166     QRSD Interval 09/28/2019 80     QT Interval 09/28/2019 398     QTC Interval 09/28/2019 438     P Axis 09/28/2019 72     QRS Cade 09/28/2019 -13     T Wave Axis 09/28/2019 36      Imaging Studies: I have personally reviewed pertinent films in PACS                Answers for HPI/ROS submitted by the patient on 12/11/2019   Abdominal pain  Chronicity: chronic  Onset: more than 1 month ago  Onset quality: gradual  Frequency: daily  Episode duration: 7 days  Progression since onset: waxing and waning  Pain location: RUQ, epigastric region  Pain - numeric: 8/10  Pain quality: cramping  Radiates to: periumbilical region  anorexia: No  arthralgias: Yes  belching: No  constipation: Yes  diarrhea: Yes  dysuria: No  fever: No  flatus: No  frequency: Yes  headaches: Yes  hematochezia: No  hematuria: No  melena: No  myalgias: Yes  nausea: Yes  weight loss: No  vomiting: No  Aggravated by: movement  Relieved by: nothing  Diagnostic workup: ultrasound, upper endoscopy

## 2019-12-11 NOTE — LETTER
December 11, 2019     Isela Lerner MD  [address] [facility]  [place] [address] [id number]    Patient: Danae Calhoun   YOB: 1955   Date of Visit: 12/11/2019       Dear Dr Janelle Sawyer:    Thank you for referring Danae Calhoun to me for evaluation  Below are my notes for this consultation  If you have questions, please do not hesitate to call me  I look forward to following your patient along with you  Sincerely,        James Kolb MD        CC: No Recipients  James Kolb MD  12/11/2019  3:16 PM  Sign at close encounter  Consultation - [facility]  Danae Calhoun 62 y o  female MRN: [id number]  Unit/Bed#:  Encounter: [id number]        Consults    ASSESSMENT/PLAN:     1  Periumbilical/Right-sided abdominal pain-likely secondary to adhesions however does have constipation at baseline therefore this may be secondary to constipation as well   -recommended high-fiber diet  Avoid gassy foods  -recommend prune juice, increase water intake to 64 oz daily  -patient is up-to-date with colonoscopy in 2017, was notable for several polyps and was recommended a repeat at 5 year interval   Given no significant change in bowel habits, will proceed with these recommendations  2  Dilated common bile duct-suspect this is likely post cholecystectomy state however given right-sided abdominal pain, dilated common bile duct, would recommend MRI/MRCP to rule out any distal biliary strictures  -LFTs on the most recent labs were unremarkable   -avoid NSAIDs  3  GERD/dysphagia-symptoms are well controlled with PPI  Patient has recently undergone dilation in May with improvement of symptoms  -will continue omeprazole 40 mg at this time   -obtain results of the previous EGD   - Patient was explained about the lifestyle and dietary modifications  Advised to avoid fatty foods, chocolates, caffeine, alcohol and any other triggering foods    Avoid eating for at least 3 hours before going to bed           ______________________________________________________________________    Reason for Consult / Principal Problem: [unfilled]    HPI: Natalia Denney is a 62y o  year old female with history of hypothyroidism, hypotension, fibromyalgia, depression, history of GERD/dysphagia, currently on omeprazole 40 mg daily, presents to establish care  Patient reports that she was in emergency room in September with right-sided abdominal pain and nausea  Patient reports the pain was periumbilical radiating to the right side  She underwent CT of abdomen and pelvis with contrast which was notable for dilated common bile duct along with cholecystectomy  There are no other acute intra-abdominal processes  Abdominal aorta and branches were patent  No retroperitoneal lymphadenopathy was noted  Patient does have history of abdominal surgeries including cholecystectomy, appendectomy, states that she has had lysis of adhesion in the past   Patient reports that the right-sided abdominal pain has subsided but does periodically come back and is localized to 1 area  She denies any relation to food intake  No association with bowel movements  She does report constipation at baseline for which she takes MiraLax on as-needed basis  Patient underwent colonoscopy in 2017 by Dr Osiel Smith which was notable for 2 polyps and sigmoid diverticulosis  She was recommended a repeat at 5 year interval     She also reports undergoing EGD in May of 2019 for esophageal dilation  Patient reports that as far as symptoms of dysphagia concerned they are very well controlled at this time, denies any acid reflux symptoms, nausea, vomiting or epigastric abdominal pain  Review of Systems: The remainder of the review of systems was negative except for the pertinent positives noted in HPI       Historical Information   Past Medical History:   Diagnosis Date    Abdominal adhesions     Anesthesia complication     difficult to wake up    Depression     Disease of thyroid gland     Fibromyalgia     GERD (gastroesophageal reflux disease)     Lazy eye     resolved: 3/27/17    Osteopenia     with joint pain-elevated DEV    Psychiatric disorder     Tinnitus     Wears glasses     for reading     Past Surgical History:   Procedure Laterality Date    ABDOMINAL SURGERY      lysis of adhesions x 2    APPENDECTOMY      CERVICAL FUSION      CHOLECYSTECTOMY      open    DILATION AND CURETTAGE OF UTERUS      NEUROMA EXCISION Right 5/26/2017    Procedure: EXCISION MASS / FIBROMA FOOT;  Surgeon: Karely Blake DPM;  Location: [facility];  Service:     RIGHT OOPHORECTOMY      WISDOM TOOTH EXTRACTION      x4     Social History   Social History     Substance and Sexual Activity   Alcohol Use Not Currently    Frequency: Never    Binge frequency: Never     Social History     Substance and Sexual Activity   Drug Use No     Social History     Tobacco Use   Smoking Status Never Smoker   Smokeless Tobacco Never Used     Family History   Problem Relation Age of Onset    Heart disease Mother     Stroke Mother 58    COPD Mother     Arthritis Mother     Hypertension Father     Kidney disease Brother         kidney transplant    No Known Problems Sister     No Known Problems Maternal Aunt     No Known Problems Maternal Uncle     No Known Problems Paternal Aunt     No Known Problems Paternal Uncle     No Known Problems Maternal Grandmother     No Known Problems Maternal Grandfather     No Known Problems Paternal Grandmother     No Known Problems Paternal Grandfather     ADD / ADHD Neg Hx     Anesthesia problems Neg Hx     Cancer Neg Hx     Clotting disorder Neg Hx     Collagen disease Neg Hx     Diabetes Neg Hx     Dislocations Neg Hx     Learning disabilities Neg Hx     Neurological problems Neg Hx     Osteoporosis Neg Hx     Rheumatologic disease Neg Hx     Scoliosis Neg Hx     Vascular Disease Neg Hx        Meds/Allergies (Not in a hospital admission)  No current facility-administered medications for this visit  Allergies   Allergen Reactions    Demerol [Meperidine] Lightheadedness     Reaction Date: 11Jan2006;     Sulfa Antibiotics Hives     Reaction Date: 11Jan2006;        Objective     Blood pressure 118/80, pulse 78, temperature 99 °F (37 2 °C), temperature source Tympanic, resp  rate 18, height 5' 4" (1 626 m), weight 76 2 kg (168 lb)  [unfilled]    PHYSICAL EXAM     GEN: well nourished, well developed, no acute distress  HEENT: anicteric, MMM, no cervical or supraclavicular lymphadenopathy  CV: RRR, no m/r/g  CHEST: CTA b/l, no WRR  ABD: +BS, soft, NT/ND, no hepatosplenomegaly  EXT: no c/c/e  SKIN: no rashes,  NEURO: aaox3    Lab Results:   No visits with results within 1 Day(s) from this visit     Latest known visit with results is:   Admission on 09/28/2019, Discharged on 09/28/2019   Component Date Value    WBC 09/28/2019 5 68     RBC 09/28/2019 4 37     Hemoglobin 09/28/2019 12 1     Hematocrit 09/28/2019 37 8     MCV 09/28/2019 87     MCH 09/28/2019 27 7     MCHC 09/28/2019 32 0     RDW 09/28/2019 13 8     MPV 09/28/2019 10 2     Platelets 04/34/7600 183     nRBC 09/28/2019 0     Neutrophils Relative 09/28/2019 66     Immat GRANS % 09/28/2019 0     Lymphocytes Relative 09/28/2019 19     Monocytes Relative 09/28/2019 9     Eosinophils Relative 09/28/2019 5     Basophils Relative 09/28/2019 1     Neutrophils Absolute 09/28/2019 3 72     Immature Grans Absolute 09/28/2019 0 02     Lymphocytes Absolute 09/28/2019 1 08     Monocytes Absolute 09/28/2019 0 53     Eosinophils Absolute 09/28/2019 0 30     Basophils Absolute 09/28/2019 0 03     Sodium 09/28/2019 139     Potassium 09/28/2019 4 1     Chloride 09/28/2019 103     CO2 09/28/2019 27     ANION GAP 09/28/2019 9     BUN 09/28/2019 14     Creatinine 09/28/2019 1 03     Glucose 09/28/2019 119     Calcium 09/28/2019 8 9     AST 09/28/2019 20     ALT 09/28/2019 28     Alkaline Phosphatase 09/28/2019 71     Total Protein 09/28/2019 7 0     Albumin 09/28/2019 3 4*    Total Bilirubin 09/28/2019 0 30     eGFR 09/28/2019 58     LACTIC ACID 09/28/2019 2 2*    LACTIC ACID 09/28/2019 1 8     Amph/Meth UR 09/28/2019 Negative     Barbiturate Ur 09/28/2019 Negative     Benzodiazepine Urine 09/28/2019 Negative     Cocaine Urine 09/28/2019 Negative     Methadone Urine 09/28/2019 Negative     Opiate Urine 09/28/2019 Positive*    PCP Ur 09/28/2019 Negative     THC Urine 09/28/2019 Negative     EXTBreath Alcohol 09/28/2019 0 000     TSH 3RD GENERATON 09/28/2019 2 575     Color, UA 09/28/2019 Light Yellow     Clarity, UA 09/28/2019 Clear     Specific [place], UA 09/28/2019 <=1 005     pH, UA 09/28/2019 7 0     Leukocytes, UA 09/28/2019 Moderate*    Nitrite, UA 09/28/2019 Negative     Protein, UA 09/28/2019 Negative     Glucose, UA 09/28/2019 Negative     Ketones, UA 09/28/2019 Negative     Urobilinogen, UA 09/28/2019 0 2     Bilirubin, UA 09/28/2019 Negative     Blood, UA 09/28/2019 Trace-Intact*    RBC, UA 09/28/2019 None Seen     WBC, UA 09/28/2019 2-4*    Epithelial Cells 09/28/2019 Occasional     Bacteria, UA 09/28/2019 Moderate*    Ventricular Rate 09/28/2019 73     Atrial Rate 09/28/2019 73     TX Interval 09/28/2019 166     QRSD Interval 09/28/2019 80     QT Interval 09/28/2019 398     QTC Interval 09/28/2019 438     P Axis 09/28/2019 72     QRS [place] 09/28/2019 -13     T Wave Axis 09/28/2019 36      Imaging Studies: I have personally reviewed pertinent films in PACS                Answers for HPI/ROS submitted by the patient on 12/11/2019   Abdominal pain  Chronicity: chronic  Onset: more than 1 month ago  Onset quality: gradual  Frequency: daily  Episode duration: 7 days  Progression since onset: waxing and waning  Pain location: RUQ, epigastric region  Pain - numeric: 8/10  Pain quality: cramping  Radiates to: periumbilical region  anorexia: No  arthralgias: Yes  belching: No  constipation: Yes  diarrhea: Yes  dysuria: No  fever: No  flatus: No  frequency: Yes  headaches: Yes  hematochezia: No  hematuria: No  melena: No  myalgias: Yes  nausea: Yes  weight loss: No  vomiting: No  Aggravated by: movement  Relieved by: nothing  Diagnostic workup: ultrasound, upper endoscopy

## 2019-12-12 DIAGNOSIS — G43.809 CERVICOGENIC MIGRAINE: ICD-10-CM

## 2019-12-12 RX ORDER — AMITRIPTYLINE HYDROCHLORIDE 25 MG/1
TABLET, FILM COATED ORAL
Qty: 60 TABLET | Refills: 0 | OUTPATIENT
Start: 2019-12-12

## 2019-12-18 ENCOUNTER — APPOINTMENT (EMERGENCY)
Dept: RADIOLOGY | Facility: HOSPITAL | Age: 64
End: 2019-12-18
Payer: COMMERCIAL

## 2019-12-18 ENCOUNTER — HOSPITAL ENCOUNTER (EMERGENCY)
Facility: HOSPITAL | Age: 64
Discharge: HOME | End: 2019-12-18
Attending: EMERGENCY MEDICINE | Admitting: EMERGENCY MEDICINE
Payer: COMMERCIAL

## 2019-12-18 VITALS
TEMPERATURE: 97.4 F | SYSTOLIC BLOOD PRESSURE: 139 MMHG | RESPIRATION RATE: 18 BRPM | DIASTOLIC BLOOD PRESSURE: 76 MMHG | BODY MASS INDEX: 28.15 KG/M2 | OXYGEN SATURATION: 98 % | HEART RATE: 86 BPM | WEIGHT: 164 LBS

## 2019-12-18 DIAGNOSIS — R07.9 CHEST PAIN: Primary | ICD-10-CM

## 2019-12-18 DIAGNOSIS — K20.90 ESOPHAGITIS: ICD-10-CM

## 2019-12-18 LAB
ALBUMIN SERPL BCP-MCNC: 3.6 G/DL (ref 3.5–5)
ALP SERPL-CCNC: 83 U/L (ref 46–116)
ALT SERPL W P-5'-P-CCNC: 28 U/L (ref 12–78)
ANION GAP SERPL CALCULATED.3IONS-SCNC: 4 MMOL/L (ref 4–13)
APTT PPP: 26 SECONDS (ref 23–37)
AST SERPL W P-5'-P-CCNC: 18 U/L (ref 5–45)
BASOPHILS # BLD AUTO: 0.04 THOUSANDS/ΜL (ref 0–0.1)
BASOPHILS NFR BLD AUTO: 1 % (ref 0–1)
BILIRUB SERPL-MCNC: 0.3 MG/DL (ref 0.2–1)
BUN SERPL-MCNC: 17 MG/DL (ref 5–25)
CALCIUM SERPL-MCNC: 9.3 MG/DL (ref 8.3–10.1)
CHLORIDE SERPL-SCNC: 105 MMOL/L (ref 100–108)
CO2 SERPL-SCNC: 30 MMOL/L (ref 21–32)
CREAT SERPL-MCNC: 1.15 MG/DL (ref 0.6–1.3)
D DIMER PPP FEU-MCNC: 0.55 UG/ML FEU (ref 0.19–0.52)
EOSINOPHIL # BLD AUTO: 0.16 THOUSAND/ΜL (ref 0–0.61)
EOSINOPHIL NFR BLD AUTO: 3 % (ref 0–6)
ERYTHROCYTE [DISTWIDTH] IN BLOOD BY AUTOMATED COUNT: 12.9 % (ref 11.6–15.1)
GFR SERPL CREATININE-BSD FRML MDRD: 51 ML/MIN/1.73SQ M
GLUCOSE SERPL-MCNC: 104 MG/DL (ref 65–140)
HCT VFR BLD AUTO: 38.6 % (ref 34.8–46.1)
HGB BLD-MCNC: 12.2 G/DL (ref 11.5–15.4)
IMM GRANULOCYTES # BLD AUTO: 0.01 THOUSAND/UL (ref 0–0.2)
IMM GRANULOCYTES NFR BLD AUTO: 0 % (ref 0–2)
INR PPP: 0.99 (ref 0.91–1.09)
LYMPHOCYTES # BLD AUTO: 1.49 THOUSANDS/ΜL (ref 0.6–4.47)
LYMPHOCYTES NFR BLD AUTO: 26 % (ref 14–44)
MCH RBC QN AUTO: 27.5 PG (ref 26.8–34.3)
MCHC RBC AUTO-ENTMCNC: 31.6 G/DL (ref 31.4–37.4)
MCV RBC AUTO: 87 FL (ref 82–98)
MONOCYTES # BLD AUTO: 0.56 THOUSAND/ΜL (ref 0.17–1.22)
MONOCYTES NFR BLD AUTO: 10 % (ref 4–12)
NEUTROPHILS # BLD AUTO: 3.58 THOUSANDS/ΜL (ref 1.85–7.62)
NEUTS SEG NFR BLD AUTO: 60 % (ref 43–75)
NRBC BLD AUTO-RTO: 0 /100 WBCS
PLATELET # BLD AUTO: 250 THOUSANDS/UL (ref 149–390)
PMV BLD AUTO: 10.4 FL (ref 8.9–12.7)
POTASSIUM SERPL-SCNC: 4.5 MMOL/L (ref 3.5–5.3)
PROT SERPL-MCNC: 7.3 G/DL (ref 6.4–8.2)
PROTHROMBIN TIME: 10.7 SECONDS (ref 9.8–12)
RBC # BLD AUTO: 4.44 MILLION/UL (ref 3.81–5.12)
SODIUM SERPL-SCNC: 139 MMOL/L (ref 136–145)
TROPONIN I SERPL-MCNC: <0.02 NG/ML
TROPONIN I SERPL-MCNC: <0.02 NG/ML
WBC # BLD AUTO: 5.84 THOUSAND/UL (ref 4.31–10.16)

## 2019-12-18 PROCEDURE — 96374 THER/PROPH/DIAG INJ IV PUSH: CPT

## 2019-12-18 PROCEDURE — 85025 COMPLETE CBC W/AUTO DIFF WBC: CPT | Performed by: EMERGENCY MEDICINE

## 2019-12-18 PROCEDURE — 85379 FIBRIN DEGRADATION QUANT: CPT | Performed by: EMERGENCY MEDICINE

## 2019-12-18 PROCEDURE — 36415 COLL VENOUS BLD VENIPUNCTURE: CPT | Performed by: EMERGENCY MEDICINE

## 2019-12-18 PROCEDURE — 71046 X-RAY EXAM CHEST 2 VIEWS: CPT

## 2019-12-18 PROCEDURE — 93005 ELECTROCARDIOGRAM TRACING: CPT

## 2019-12-18 PROCEDURE — 71275 CT ANGIOGRAPHY CHEST: CPT

## 2019-12-18 PROCEDURE — 71045 X-RAY EXAM CHEST 1 VIEW: CPT

## 2019-12-18 PROCEDURE — 99285 EMERGENCY DEPT VISIT HI MDM: CPT

## 2019-12-18 PROCEDURE — 85730 THROMBOPLASTIN TIME PARTIAL: CPT | Performed by: EMERGENCY MEDICINE

## 2019-12-18 PROCEDURE — C9113 INJ PANTOPRAZOLE SODIUM, VIA: HCPCS | Performed by: EMERGENCY MEDICINE

## 2019-12-18 PROCEDURE — 85610 PROTHROMBIN TIME: CPT | Performed by: EMERGENCY MEDICINE

## 2019-12-18 PROCEDURE — 84484 ASSAY OF TROPONIN QUANT: CPT | Performed by: EMERGENCY MEDICINE

## 2019-12-18 PROCEDURE — 80053 COMPREHEN METABOLIC PANEL: CPT | Performed by: EMERGENCY MEDICINE

## 2019-12-18 RX ORDER — FAMOTIDINE 20 MG/1
20 TABLET, FILM COATED ORAL 2 TIMES DAILY
Qty: 30 TABLET | Refills: 0 | Status: SHIPPED | OUTPATIENT
Start: 2019-12-18 | End: 2020-01-26 | Stop reason: ALTCHOICE

## 2019-12-18 RX ORDER — PANTOPRAZOLE SODIUM 40 MG/1
40 INJECTION, POWDER, FOR SOLUTION INTRAVENOUS ONCE
Status: COMPLETED | OUTPATIENT
Start: 2019-12-18 | End: 2019-12-18

## 2019-12-18 RX ORDER — MAGNESIUM HYDROXIDE/ALUMINUM HYDROXICE/SIMETHICONE 120; 1200; 1200 MG/30ML; MG/30ML; MG/30ML
30 SUSPENSION ORAL ONCE
Status: COMPLETED | OUTPATIENT
Start: 2019-12-18 | End: 2019-12-18

## 2019-12-18 RX ORDER — ALBUTEROL SULFATE 90 UG/1
2 AEROSOL, METERED RESPIRATORY (INHALATION) EVERY 4 HOURS PRN
Qty: 1 INHALER | Refills: 0 | Status: SHIPPED | OUTPATIENT
Start: 2019-12-18 | End: 2020-07-06 | Stop reason: SDUPTHER

## 2019-12-18 RX ADMIN — IOHEXOL 85 ML: 350 INJECTION, SOLUTION INTRAVENOUS at 18:11

## 2019-12-18 RX ADMIN — ALUMINUM HYDROXIDE, MAGNESIUM HYDROXIDE, AND SIMETHICONE 30 ML: 200; 200; 20 SUSPENSION ORAL at 15:11

## 2019-12-18 RX ADMIN — PANTOPRAZOLE SODIUM 40 MG: 40 INJECTION, POWDER, FOR SOLUTION INTRAVENOUS at 15:10

## 2019-12-18 NOTE — ED PROVIDER NOTES
History  Chief Complaint   Patient presents with    Chest Pain     Pt reports she woke up this morning with some chest tightness, still wasn't feeling well while at day program       Patient states she woke this morning around 0600 hours with the chest discomfort she describes as tightness associated with malaise and shortness of breath  Patient had a diaphoresis or nausea or vomiting  She states she was well enough to go to her psychiatric day program, as well as her physical therapy this afternoon  The pain and shortness of breath has continued she decided to get it checked out  Patient states she had a recent episode of similar symptoms 1 month ago  She was hospitalized for 4 days at [facility], had nuclear stress test echocardiogram as part of her cardiac evaluation  Cardiologist did not make changes to her meds, and did not do catheterization  Patient arrives with medics who administer aspirin prior to arrival          Prior to Admission Medications   Prescriptions Last Dose Informant Patient Reported? Taking?    DULoxetine (CYMBALTA) 30 mg delayed release capsule   Yes No   DULoxetine (CYMBALTA) 60 mg delayed release capsule   No No   Sig: Take 1 capsule (60 mg total) by mouth daily   QUEtiapine (SEROquel) 50 mg tablet   Yes No   Sig: Take 50 mg by mouth daily at bedtime   atorvastatin (LIPITOR) 20 mg tablet   Yes No   Sig: Take 40 mg by mouth daily   calcium carbonate-vitamin D (OSCAL-D) 500 mg-200 units per tablet   No No   Sig: Take 1 tablet by mouth daily with breakfast   gabapentin (NEURONTIN) 300 mg capsule   No No   Sig: Take 1 capsule (300 mg total) by mouth daily   gabapentin (NEURONTIN) 300 mg capsule   No No   Sig: Take 2 capsules (600 mg total) by mouth daily at bedtime   levothyroxine 50 mcg tablet   No No   Sig: Take 1 tablet (50 mcg total) by mouth daily   meclizine (ANTIVERT) 25 mg tablet   Yes No   Sig: take 1 tablet by mouth every 6 hours if needed  IF DIZZINESS   midodrine (PROAMATINE) 10 MG tablet  Self Yes No   Sig: Take 10 mg by mouth 2 (two) times a day   omeprazole (PriLOSEC) 40 MG capsule   No No   Sig: Take 1 capsule (40 mg total) by mouth daily before breakfast   traZODone (DESYREL) 50 mg tablet  Self Yes No   Sig: Take 50 mg by mouth daily at bedtime as needed for sleep      Facility-Administered Medications: None       Past Medical History:   Diagnosis Date    Abdominal adhesions     Anesthesia complication     difficult to wake up    Anxiety     Depression     Depression     Disease of thyroid gland     Fibromyalgia     GERD (gastroesophageal reflux disease)     Lazy eye     resolved: 3/27/17    Osteopenia     with joint pain-elevated DEV    Psychiatric disorder     Tinnitus     Wears glasses     for reading       Past Surgical History:   Procedure Laterality Date    ABDOMINAL SURGERY      lysis of adhesions x 2    APPENDECTOMY      CERVICAL FUSION      CHOLECYSTECTOMY      open    DILATION AND CURETTAGE OF UTERUS      NEUROMA EXCISION Right 5/26/2017    Procedure: EXCISION MASS / FIBROMA FOOT;  Surgeon: Adria Anand DPM;  Location: [facility];  Service:     RIGHT OOPHORECTOMY      WISDOM TOOTH EXTRACTION      x4       Family History   Problem Relation Age of Onset    Heart disease Mother     Stroke Mother 58    COPD Mother     Arthritis Mother     Hypertension Father     Kidney disease Brother         kidney transplant    No Known Problems Sister     No Known Problems Maternal Aunt     No Known Problems Maternal Uncle     No Known Problems Paternal Aunt     No Known Problems Paternal Uncle     No Known Problems Maternal Grandmother     No Known Problems Maternal Grandfather     No Known Problems Paternal Grandmother     No Known Problems Paternal Grandfather     ADD / ADHD Neg Hx     Anesthesia problems Neg Hx     Cancer Neg Hx     Clotting disorder Neg Hx     Collagen disease Neg Hx     Diabetes Neg Hx     Dislocations Neg Hx     Learning disabilities Neg Hx     Neurological problems Neg Hx     Osteoporosis Neg Hx     Rheumatologic disease Neg Hx     Scoliosis Neg Hx     Vascular Disease Neg Hx      I have reviewed and agree with the history as documented  Social History     Tobacco Use    Smoking status: Never Smoker    Smokeless tobacco: Never Used   Substance Use Topics    Alcohol use: Not Currently     Frequency: Never     Binge frequency: Never    Drug use: No        Review of Systems   Constitutional: Negative for fever  HENT: Negative for congestion and sore throat  Eyes: Negative for visual disturbance  Respiratory: Positive for cough and shortness of breath  Cardiovascular: Positive for chest pain  Negative for palpitations and leg swelling  Gastrointestinal: Negative for abdominal pain and vomiting  Genitourinary: Negative for dysuria  Musculoskeletal: Negative for arthralgias, back pain and neck pain  Skin: Negative for rash  Neurological: Negative for weakness and headaches  Hematological: Does not bruise/bleed easily  Psychiatric/Behavioral: Positive for dysphoric mood  Negative for confusion  All other systems reviewed and are negative  Physical Exam  Physical Exam   Constitutional: She is oriented to person, place, and time  She appears well-developed and well-nourished  HENT:   Head: Normocephalic and atraumatic  Eyes: Pupils are equal, round, and reactive to light  EOM are normal    Neck: Normal range of motion  Neck supple  Cardiovascular: Normal rate, regular rhythm and normal pulses  No murmur heard  Pulmonary/Chest: Effort normal and breath sounds normal  She has no wheezes  She has no rales  Abdominal: Soft  Bowel sounds are normal  There is tenderness  Patient is quite tender in the epigastrium   Musculoskeletal: Normal range of motion  Right lower leg: Normal         Left lower leg: Normal    Neurological: She is alert and oriented to person, place, and time  Skin: Skin is warm and dry  Capillary refill takes less than 2 seconds  Psychiatric: She has a normal mood and affect  Her behavior is normal    Nursing note and vitals reviewed  Vital Signs  ED Triage Vitals [12/18/19 1440]   Temperature Pulse Respirations Blood Pressure SpO2   (!) 97 4 °F (36 3 °C) 86 18 153/81 98 %      Temp Source Heart Rate Source Patient Position - Orthostatic VS BP Location FiO2 (%)   Tympanic Monitor Lying Right arm --      Pain Score       6           Vitals:    12/18/19 1440   BP: 153/81   Pulse: 86   Patient Position - Orthostatic VS: Lying         Visual Acuity      ED Medications  Medications   pantoprazole (PROTONIX) injection 40 mg (40 mg Intravenous Given 12/18/19 1510)   aluminum-magnesium hydroxide-simethicone (MYLANTA) 200-200-20 mg/5 mL oral suspension 30 mL (30 mL Oral Given 12/18/19 1511)       Diagnostic Studies  Results Reviewed     Procedure Component Value Units Date/Time    Protime-INR [[id number]] Collected:  12/18/19 1509    Lab Status: In process Specimen:  Blood from Arm, Left Updated:  12/18/19 1513    APTT [[id number]] Collected:  12/18/19 1509    Lab Status: In process Specimen:  Blood from Arm, Left Updated:  12/18/19 1513    D-Dimer [[id number]] Collected:  12/18/19 1509    Lab Status: In process Specimen:  Blood from Arm, Left Updated:  12/18/19 1513    Comprehensive metabolic panel [[id number]] Collected:  12/18/19 1509    Lab Status: In process Specimen:  Blood from Arm, Left Updated:  12/18/19 1513    Troponin I [[id number]] Collected:  12/18/19 1509    Lab Status: In process Specimen:  Blood from Arm, Left Updated:  12/18/19 1513    CBC and differential [[id number]] Collected:  12/18/19 1509    Lab Status:   In process Specimen:  Blood from Arm, Left Updated:  12/18/19 1513                 XR chest 1 view portable    (Results Pending)              Procedures  ECG 12 Lead Documentation Only  Date/Time: 12/18/2019 2:44 PM  Performed by: Kai Bedoya MD  Authorized by: Gevena Cooks, MD     Indications / Diagnosis:  Chest tightness  ECG reviewed by me, the ED Provider: yes    Patient location:  ED  Interpretation:     Interpretation: normal    Rate:     ECG rate:  82    ECG rate assessment: normal    Rhythm:     Rhythm: sinus rhythm    Ectopy:     Ectopy: none    QRS:     QRS axis:  Normal    QRS intervals:  Normal  Conduction:     Conduction: normal    ST segments:     ST segments:  Normal  T waves:     T waves: normal               ED Course                               MDM  Number of Diagnoses or Management Options  Diagnosis management comments: Patient has moderate risk score, but had recent cardiac evaluation including nuclear stress test and echocardiogram   I suspect esophagitis  She has had a long history of GERD, has been on omeprazole for years  Disposition  Final diagnoses:   None     ED Disposition     None      Follow-up Information    None         Patient's Medications   Discharge Prescriptions    No medications on file     No discharge procedures on file      ED Provider  Electronically Signed by           Gevena Cooks, MD  12/18/19 0142

## 2019-12-19 LAB
ATRIAL RATE: 82 BPM
P AXIS: 48 DEGREES
PR INTERVAL: 166 MS
QRS AXIS: -23 DEGREES
QRSD INTERVAL: 82 MS
QT INTERVAL: 390 MS
QTC INTERVAL: 455 MS
T WAVE AXIS: 11 DEGREES
VENTRICULAR RATE: 82 BPM

## 2019-12-19 PROCEDURE — 93010 ELECTROCARDIOGRAM REPORT: CPT | Performed by: INTERNAL MEDICINE

## 2019-12-20 ENCOUNTER — VBI (OUTPATIENT)
Dept: FAMILY MEDICINE CLINIC | Facility: CLINIC | Age: 64
End: 2019-12-20

## 2019-12-20 NOTE — TELEPHONE ENCOUNTER
Angel Dang    ED Visit Information     Ed visit date: 12/18/2019  Diagnosis Description: Chest pain; Esophagitis  In Network? Yes Betty [facility]  Discharge status: Home  Discharged with meds ? Yes  Number of ED visits to date: 7  ED Severity:NA     Outreach Information    Outreach successful: Yes 2  Date letter mailed:12/23/2019  Date Finalized:12/23/2019    Care Coordination    Follow up appointment with pcp: no no  Transportation issues ?  NA    [employer] Outreach    12/20/2019 9:38 AM - [facility]  12/23/2019 11:57 AM

## 2019-12-20 NOTE — LETTER
[facility]  [address]  [address]  [place] [id number]    Date: 12/23/19     Natalia Hodge [id number]    Dear Lorena Everett: Thank you for choosing [facility] for care  As your primary care provider we want to make sure that your ongoing medical care is being addressed  If you require follow up care as a result of your emergency department visit, there are a few things we would like you to know  As part of our continuing commitment to caring for our patient, we have added more same day appointments and have extended our office hours to meet your medical needs  After hours, on-call physicians are available via our main office line  We encourage you to contact our office prior to seeking treatment to discuss your symptoms with our medical staff  Together, we can determine the correct course of action  A majority of non-emergent conditions such as: common cold, flu-like symptoms, fevers, strains/sprains, dislocations, minor burns, cuts and animal bites can be treated at [address] [facility] facilities  Diagnostic testing is available at some sites  Of course, if you are experiencing a life threatening medical emergency call 911 or proceed directly to the nearest emergency room      SKIP THE WAIT  Conveniently offered at most [facility] locations  [employer] your spot online at [email or web address] or on the [facility] [address]    Sincerely,        [facility]  Dept: [phone]

## 2020-01-07 ENCOUNTER — TELEPHONE (OUTPATIENT)
Dept: GASTROENTEROLOGY | Facility: AMBULARY SURGERY CENTER | Age: 65
End: 2020-01-07

## 2020-01-07 NOTE — TELEPHONE ENCOUNTER
Acknowledged, patient is declining MRI at this point as her abdominal pain symptoms appear to have subsided; these were felt to be most likely related to adhesions or constipation regardless  If her pain recurs, however, I would advise that she have this pursued, as a retained CBD stone can produce intermittent pains which can subside for some time before reoccurring  If she develops severe pain, especially if associated with fevers or jaundice, she should go to the emergency room    Please advise, thank you

## 2020-01-07 NOTE — TELEPHONE ENCOUNTER
Patients GI provider:  Dr Ladonna Stinson    Number to return call: ( n /a   Reason for call: Pt calling to alert doctor that she is no longer having symptoms and will cancel her mri abdomen for now    Scheduled procedure/appointment date if applicable: Apt/procedure n / A

## 2020-01-11 ENCOUNTER — OFFICE VISIT (OUTPATIENT)
Dept: URGENT CARE | Facility: CLINIC | Age: 65
End: 2020-01-11
Payer: COMMERCIAL

## 2020-01-11 VITALS
TEMPERATURE: 98.3 F | RESPIRATION RATE: 18 BRPM | BODY MASS INDEX: 28.51 KG/M2 | OXYGEN SATURATION: 98 % | HEART RATE: 98 BPM | WEIGHT: 167 LBS | SYSTOLIC BLOOD PRESSURE: 99 MMHG | DIASTOLIC BLOOD PRESSURE: 68 MMHG | HEIGHT: 64 IN

## 2020-01-11 DIAGNOSIS — R06.2 INSPIRATORY WHEEZE ON EXAMINATION: ICD-10-CM

## 2020-01-11 DIAGNOSIS — R68.89 FLU-LIKE SYMPTOMS: Primary | ICD-10-CM

## 2020-01-11 PROCEDURE — 99213 OFFICE O/P EST LOW 20 MIN: CPT | Performed by: NURSE PRACTITIONER

## 2020-01-11 RX ORDER — OSELTAMIVIR PHOSPHATE 75 MG/1
75 CAPSULE ORAL 2 TIMES DAILY
Qty: 10 CAPSULE | Refills: 0 | Status: SHIPPED | OUTPATIENT
Start: 2020-01-11 | End: 2020-01-16

## 2020-01-11 RX ORDER — METHYLPREDNISOLONE 4 MG/1
TABLET ORAL
Qty: 21 TABLET | Refills: 0 | Status: SHIPPED | OUTPATIENT
Start: 2020-01-11 | End: 2020-01-27 | Stop reason: ALTCHOICE

## 2020-01-11 NOTE — PROGRESS NOTES
[facility]        NAME: Baltazar Yo is a 62 y o  female  : 1955    MRN: [id number]  DATE: 2020  TIME: 8:52 am    Assessment and Plan   Flu-like symptoms [R68 89]  1  Flu-like symptoms  oseltamivir (TAMIFLU) 75 mg capsule   2  Inspiratory wheeze on examination  methylPREDNISolone 4 MG tablet therapy pack     Declined Flu swab due to insurance    Patient Instructions     Tylenol/Ibuprofen as needed  Rest  Stay well hydrated     Water, soup broth, tea with honey etc  Practice good hand washing  Follow up as needed  Go to the ER if symptoms worsen  Take Tamiflu as prescribed  Use inhaler as prescribed by PCP  Take steroid as prescribed  Follow up with PCP in 3-5 days  Proceed to  ER if symptoms worsen  Chief Complaint     Chief Complaint   Patient presents with    Cough     cough ,chills, temp since last night, SOB         History of Present Illness       61 y/o female presents for acute onset of cough, feer, chills, body aches, HA, and wheezing that began yesterday  Tylenol and Ibuprofen do help relieve the fevers any ease the body aches  She has a decrease in appetite , is tolerating fluids  Review of Systems   Review of Systems   Constitutional: Positive for chills, fatigue and fever  HENT: Positive for congestion, postnasal drip and rhinorrhea  Negative for sinus pressure and sore throat  Respiratory: Positive for cough and wheezing  Negative for shortness of breath  Cardiovascular: Negative for chest pain and palpitations  Gastrointestinal: Negative for diarrhea, nausea and vomiting  Musculoskeletal: Positive for myalgias  Skin: Positive for pallor  Negative for rash  Neurological: Positive for headaches           Current Medications       Current Outpatient Medications:     albuterol (PROVENTIL HFA,VENTOLIN HFA) 90 mcg/act inhaler, Inhale 2 puffs every 4 (four) hours as needed for wheezing, Disp: 1 Inhaler, Rfl: 0    atorvastatin (LIPITOR) 20 mg tablet, Take 40 mg by mouth daily, Disp: , Rfl: 0    calcium carbonate-vitamin D (OSCAL-D) 500 mg-200 units per tablet, Take 1 tablet by mouth daily with breakfast, Disp: 30 tablet, Rfl: 0    DULoxetine (CYMBALTA) 60 mg delayed release capsule, Take 1 capsule (60 mg total) by mouth daily, Disp: 30 capsule, Rfl: 0    gabapentin (NEURONTIN) 300 mg capsule, Take 1 capsule (300 mg total) by mouth daily, Disp: 30 capsule, Rfl: 0    levothyroxine 50 mcg tablet, Take 1 tablet (50 mcg total) by mouth daily, Disp: 30 tablet, Rfl: 3    midodrine (PROAMATINE) 10 MG tablet, Take 10 mg by mouth 2 (two) times a day, Disp: , Rfl:     omeprazole (PriLOSEC) 40 MG capsule, Take 1 capsule (40 mg total) by mouth daily before breakfast, Disp: 30 capsule, Rfl: 1    QUEtiapine (SEROquel) 50 mg tablet, Take 50 mg by mouth daily at bedtime, Disp: , Rfl:     traZODone (DESYREL) 50 mg tablet, Take 50 mg by mouth daily at bedtime as needed for sleep, Disp: , Rfl:     DULoxetine (CYMBALTA) 30 mg delayed release capsule, 60 mg , Disp: , Rfl: 0    famotidine (PEPCID) 20 mg tablet, Take 1 tablet (20 mg total) by mouth 2 (two) times a day (Patient not taking: Reported on 1/11/2020), Disp: 30 tablet, Rfl: 0    gabapentin (NEURONTIN) 300 mg capsule, Take 2 capsules (600 mg total) by mouth daily at bedtime (Patient not taking: Reported on 1/11/2020), Disp: 60 capsule, Rfl: 0    meclizine (ANTIVERT) 25 mg tablet, take 1 tablet by mouth every 6 hours if needed  IF DIZZINESS, Disp: , Rfl: 0    methylPREDNISolone 4 MG tablet therapy pack, Use as directed on package, Disp: 21 tablet, Rfl: 0    oseltamivir (TAMIFLU) 75 mg capsule, Take 1 capsule (75 mg total) by mouth 2 (two) times a day for 5 days, Disp: 10 capsule, Rfl: 0    Current Allergies     Allergies as of 01/11/2020 - Reviewed 01/11/2020   Allergen Reaction Noted    Demerol [meperidine] Lightheadedness 01/11/2006    Sulfa antibiotics Hives 01/11/2006            The following portions of the patient's history were reviewed and updated as appropriate: allergies, current medications, past family history, past medical history, past social history, past surgical history and problem list      Past Medical History:   Diagnosis Date    Abdominal adhesions     Anesthesia complication     difficult to wake up    Anxiety     Depression     Depression     Disease of thyroid gland     Fibromyalgia     GERD (gastroesophageal reflux disease)     Lazy eye     resolved: 3/27/17    Osteopenia     with joint pain-elevated DEV    Psychiatric disorder     Tinnitus     Wears glasses     for reading       Past Surgical History:   Procedure Laterality Date    ABDOMINAL SURGERY      lysis of adhesions x 2    APPENDECTOMY      CERVICAL FUSION      CHOLECYSTECTOMY      open    DILATION AND CURETTAGE OF UTERUS      NEUROMA EXCISION Right 5/26/2017    Procedure: EXCISION MASS / FIBROMA FOOT;  Surgeon: Anabelle Ureña DPM;  Location: [facility];  Service:     RIGHT OOPHORECTOMY      WISDOM TOOTH EXTRACTION      x4       Family History   Problem Relation Age of Onset    Heart disease Mother     Stroke Mother 58    COPD Mother     Arthritis Mother     Hypertension Father     Kidney disease Brother         kidney transplant    No Known Problems Sister     No Known Problems Maternal Aunt     No Known Problems Maternal Uncle     No Known Problems Paternal Aunt     No Known Problems Paternal Uncle     No Known Problems Maternal Grandmother     No Known Problems Maternal Grandfather     No Known Problems Paternal Grandmother     No Known Problems Paternal Grandfather     ADD / ADHD Neg Hx     Anesthesia problems Neg Hx     Cancer Neg Hx     Clotting disorder Neg Hx     Collagen disease Neg Hx     Diabetes Neg Hx     Dislocations Neg Hx     Learning disabilities Neg Hx     Neurological problems Neg Hx     Osteoporosis Neg Hx     Rheumatologic disease Neg Hx     Scoliosis Neg Hx  Vascular Disease Neg Hx          Medications have been verified  Objective   BP 99/68   Pulse 98   Temp 98 3 °F (36 8 °C)   Resp 18   Ht 5' 4" (1 626 m)   Wt 75 8 kg (167 lb)   LMP  (LMP Unknown)   SpO2 98%   BMI 28 67 kg/m²        Physical Exam     Physical Exam   Constitutional: Vital signs are normal  She appears well-developed and well-nourished  She appears ill  She appears distressed  (Distressed from symptoms)   HENT:   Right Ear: Tympanic membrane and ear canal normal    Left Ear: Tympanic membrane and ear canal normal    Nose: Mucosal edema and rhinorrhea present  Cardiovascular: Normal rate, regular rhythm and normal heart sounds  Pulmonary/Chest: Effort normal  She has wheezes  Lymphadenopathy:     She has no cervical adenopathy  Neurological: She is alert  Skin: Skin is warm and dry  There is pallor  Psychiatric: She has a normal mood and affect  Nursing note and vitals reviewed

## 2020-01-11 NOTE — PATIENT INSTRUCTIONS
Tylenol/Ibuprofen as needed  Rest  Stay well hydrated     Water, soup broth, tea with honey etc  Practice good hand washing  Follow up as needed  Go to the ER if symptoms worsen      Take Tamiflu as prescribed  Use inhaler as prescribed by PCP  Take steroid as prescribed

## 2020-01-25 DIAGNOSIS — K21.9 GASTROESOPHAGEAL REFLUX DISEASE WITHOUT ESOPHAGITIS: ICD-10-CM

## 2020-01-26 RX ORDER — OMEPRAZOLE 40 MG/1
CAPSULE, DELAYED RELEASE ORAL
Qty: 30 CAPSULE | Refills: 5 | Status: SHIPPED | OUTPATIENT
Start: 2020-01-26 | End: 2020-05-07 | Stop reason: SDUPTHER

## 2020-01-26 NOTE — PROGRESS NOTES
BMI Counseling: Body mass index is 29 66 kg/m²  The BMI is above normal  Nutrition recommendations include reducing portion sizes and decreasing overall calorie intake  Exercise recommendations include moderate aerobic physical activity for 150 minutes/week  Assessment/Plan:    1  Other specified hypothyroidism  Assessment & Plan:  She appears clinically euthyroid  Will check labs, continue levothyroxine at current dose  2  Hypertension, unspecified type  Assessment & Plan:  She continues on midodrine, managed by specialist   BP was mildly low today but there is no change with position and she is asymptomatic  3  Moderate episode of recurrent major depressive disorder [facility])  Assessment & Plan:  She continues to see her psychiatrist and feels she is stable on her medications  4  Mixed dyslipidemia  Assessment & Plan:  Continue atorvastatin and will check labs for lipids and LFT's  Advised on low fat diet  Orders:  -     Lipid panel; Future  -     HEMOGLOBIN A1C W/ EAG ESTIMATION; Future  -     Lipid panel  -     HEMOGLOBIN A1C W/ EAG ESTIMATION    5  Dyspnea, unspecified type  -     POCT spirometry    6  Encounter for hepatitis C screening test for low risk patient  -     Hepatitis C antibody; Future  -     Hepatitis C antibody    7  Screening breast examination  -     Mammo screening bilateral w cad; Future; Expected date: 01/27/2020    8  Elevated glucose  -     HEMOGLOBIN A1C W/ EAG ESTIMATION; Future  -     HEMOGLOBIN A1C W/ EAG ESTIMATION    9  Restrictive lung disease  Comments:  Spirometry shows mild restriction  Will continue rescue inhaler PRN  She has had CT recently showing possible small airways disease  Will refer to pulmonology at her request for further management and work up  Orders:  -     Ambulatory referral to Pulmonology; Future          There are no Patient Instructions on file for this visit  Return in about 6 months (around 7/27/2020)      Subjective:      Patient ID: Karin Hoffman is a 62 y o  female  Chief Complaint   Patient presents with    Follow-up     2 month prcma    Hypotension       Here for a follow up of multiple issues  Continues to see her psychiatrist for history of depression and feels this is well controlled, goes regularly  Has been having difficulties with her breathing, wheezing, and occasional hives  Takes benadryl prn and uses a rescue inhaler, which helps  She does not want to take a daily antihistamine and does not want to see an allergist, states has had allergy testing years ago and it was all normal, does not want to repeat  Has never seen a pulmonologist and would be willing to do this  We did a spirometry today which showed mild restriction, no obstructive pattern  Taking all meds regularly  Denies weight change, cold or heat intolerance, diarrhea or constipation  The following portions of the patient's history were reviewed and updated as appropriate: allergies, current medications, past family history, past medical history, past social history, past surgical history and problem list     Review of Systems   Constitutional: Negative  Respiratory: Positive for shortness of breath and wheezing  Cardiovascular: Negative  Endocrine: Negative            Current Outpatient Medications   Medication Sig Dispense Refill    albuterol (PROVENTIL HFA,VENTOLIN HFA) 90 mcg/act inhaler Inhale 2 puffs every 4 (four) hours as needed for wheezing 1 Inhaler 0    atorvastatin (LIPITOR) 20 mg tablet Take 40 mg by mouth daily  0    calcium carbonate-vitamin D (OSCAL-D) 500 mg-200 units per tablet Take 1 tablet by mouth daily with breakfast 30 tablet 0    DULoxetine (CYMBALTA) 60 mg delayed release capsule Take 1 capsule (60 mg total) by mouth daily 30 capsule 0    gabapentin (NEURONTIN) 300 mg capsule Take 2 capsules (600 mg total) by mouth daily at bedtime 60 capsule 0    levothyroxine 50 mcg tablet Take 1 tablet (50 mcg total) by mouth daily 30 tablet 3    meclizine (ANTIVERT) 25 mg tablet take 1 tablet by mouth every 6 hours if needed  IF DIZZINESS  0    midodrine (PROAMATINE) 10 MG tablet Take 10 mg by mouth 2 (two) times a day      omeprazole (PriLOSEC) 40 MG capsule take 1 capsule by mouth once daily  BEFORE BREAKFAST 30 capsule 5    QUEtiapine (SEROquel) 50 mg tablet Take 50 mg by mouth daily at bedtime      traZODone (DESYREL) 50 mg tablet Take 50 mg by mouth daily at bedtime as needed for sleep       No current facility-administered medications for this visit  Objective:    BP 90/60   Pulse 88   Temp 97 9 °F (36 6 °C)   Resp 16   Ht 5' 4" (1 626 m)   Wt 78 4 kg (172 lb 12 8 oz)   LMP  (LMP Unknown)   BMI 29 66 kg/m²        Physical Exam   Constitutional: She appears well-developed and well-nourished  Eyes: Conjunctivae are normal    Neck: Neck supple  No JVD present  No thyromegaly present  Cardiovascular: Normal rate, regular rhythm, normal heart sounds and intact distal pulses  Exam reveals no gallop and no friction rub  No murmur heard  Pulmonary/Chest: Effort normal and breath sounds normal  She has no wheezes  She has no rales  Abdominal: Soft  Bowel sounds are normal  She exhibits no distension  There is no tenderness  Musculoskeletal: She exhibits no edema                Fabian Retana MD

## 2020-01-27 ENCOUNTER — OFFICE VISIT (OUTPATIENT)
Dept: FAMILY MEDICINE CLINIC | Facility: CLINIC | Age: 65
End: 2020-01-27
Payer: COMMERCIAL

## 2020-01-27 VITALS
RESPIRATION RATE: 16 BRPM | HEIGHT: 64 IN | TEMPERATURE: 97.9 F | SYSTOLIC BLOOD PRESSURE: 90 MMHG | WEIGHT: 172.8 LBS | BODY MASS INDEX: 29.5 KG/M2 | HEART RATE: 88 BPM | DIASTOLIC BLOOD PRESSURE: 60 MMHG

## 2020-01-27 DIAGNOSIS — J98.4 RESTRICTIVE LUNG DISEASE: ICD-10-CM

## 2020-01-27 DIAGNOSIS — I10 HYPERTENSION, UNSPECIFIED TYPE: ICD-10-CM

## 2020-01-27 DIAGNOSIS — Z12.39 SCREENING BREAST EXAMINATION: ICD-10-CM

## 2020-01-27 DIAGNOSIS — E78.2 MIXED DYSLIPIDEMIA: ICD-10-CM

## 2020-01-27 DIAGNOSIS — F33.1 MODERATE EPISODE OF RECURRENT MAJOR DEPRESSIVE DISORDER (HCC): ICD-10-CM

## 2020-01-27 DIAGNOSIS — E03.8 OTHER SPECIFIED HYPOTHYROIDISM: Primary | ICD-10-CM

## 2020-01-27 DIAGNOSIS — R06.00 DYSPNEA, UNSPECIFIED TYPE: ICD-10-CM

## 2020-01-27 DIAGNOSIS — Z11.59 ENCOUNTER FOR HEPATITIS C SCREENING TEST FOR LOW RISK PATIENT: ICD-10-CM

## 2020-01-27 DIAGNOSIS — R73.09 ELEVATED GLUCOSE: ICD-10-CM

## 2020-01-27 PROBLEM — I95.9 HYPOTENSION: Status: ACTIVE | Noted: 2019-03-19

## 2020-01-27 PROCEDURE — 3078F DIAST BP <80 MM HG: CPT | Performed by: INTERNAL MEDICINE

## 2020-01-27 PROCEDURE — 3074F SYST BP LT 130 MM HG: CPT | Performed by: INTERNAL MEDICINE

## 2020-01-27 PROCEDURE — 94010 BREATHING CAPACITY TEST: CPT | Performed by: INTERNAL MEDICINE

## 2020-01-27 PROCEDURE — 99214 OFFICE O/P EST MOD 30 MIN: CPT | Performed by: INTERNAL MEDICINE

## 2020-01-27 NOTE — ASSESSMENT & PLAN NOTE
She continues on midodrine, managed by specialist   BP was mildly low today but there is no change with position and she is asymptomatic

## 2020-02-01 ENCOUNTER — APPOINTMENT (OUTPATIENT)
Dept: RADIOLOGY | Facility: CLINIC | Age: 65
End: 2020-02-01
Payer: COMMERCIAL

## 2020-02-01 ENCOUNTER — HOSPITAL ENCOUNTER (EMERGENCY)
Facility: HOSPITAL | Age: 65
Discharge: HOME | End: 2020-02-01
Attending: EMERGENCY MEDICINE
Payer: COMMERCIAL

## 2020-02-01 ENCOUNTER — OFFICE VISIT (OUTPATIENT)
Dept: URGENT CARE | Facility: CLINIC | Age: 65
End: 2020-02-01
Payer: COMMERCIAL

## 2020-02-01 ENCOUNTER — APPOINTMENT (EMERGENCY)
Dept: RADIOLOGY | Facility: HOSPITAL | Age: 65
End: 2020-02-01
Payer: COMMERCIAL

## 2020-02-01 VITALS
TEMPERATURE: 97 F | DIASTOLIC BLOOD PRESSURE: 77 MMHG | RESPIRATION RATE: 18 BRPM | HEART RATE: 88 BPM | SYSTOLIC BLOOD PRESSURE: 133 MMHG | OXYGEN SATURATION: 99 %

## 2020-02-01 VITALS
SYSTOLIC BLOOD PRESSURE: 155 MMHG | DIASTOLIC BLOOD PRESSURE: 86 MMHG | HEART RATE: 79 BPM | OXYGEN SATURATION: 95 % | HEIGHT: 64 IN | WEIGHT: 173.4 LBS | TEMPERATURE: 98.8 F | BODY MASS INDEX: 29.6 KG/M2

## 2020-02-01 DIAGNOSIS — R06.02 SHORTNESS OF BREATH: ICD-10-CM

## 2020-02-01 DIAGNOSIS — R07.89 ATYPICAL CHEST PAIN: Primary | ICD-10-CM

## 2020-02-01 DIAGNOSIS — R55 SYNCOPE, UNSPECIFIED SYNCOPE TYPE: Primary | ICD-10-CM

## 2020-02-01 LAB
ALBUMIN SERPL BCP-MCNC: 3.5 G/DL (ref 3.5–5)
ALP SERPL-CCNC: 77 U/L (ref 46–116)
ALT SERPL W P-5'-P-CCNC: 33 U/L (ref 12–78)
ANION GAP SERPL CALCULATED.3IONS-SCNC: 7 MMOL/L (ref 4–13)
AST SERPL W P-5'-P-CCNC: 25 U/L (ref 5–45)
BASOPHILS # BLD AUTO: 0.03 THOUSANDS/ΜL (ref 0–0.1)
BASOPHILS NFR BLD AUTO: 1 % (ref 0–1)
BILIRUB SERPL-MCNC: 0.3 MG/DL (ref 0.2–1)
BUN SERPL-MCNC: 13 MG/DL (ref 5–25)
CALCIUM SERPL-MCNC: 8.9 MG/DL (ref 8.3–10.1)
CHLORIDE SERPL-SCNC: 105 MMOL/L (ref 100–108)
CO2 SERPL-SCNC: 29 MMOL/L (ref 21–32)
CREAT SERPL-MCNC: 0.98 MG/DL (ref 0.6–1.3)
D DIMER PPP FEU-MCNC: 0.82 UG/ML FEU (ref 0.19–0.52)
EOSINOPHIL # BLD AUTO: 0.21 THOUSAND/ΜL (ref 0–0.61)
EOSINOPHIL NFR BLD AUTO: 4 % (ref 0–6)
ERYTHROCYTE [DISTWIDTH] IN BLOOD BY AUTOMATED COUNT: 13.5 % (ref 11.6–15.1)
GFR SERPL CREATININE-BSD FRML MDRD: 62 ML/MIN/1.73SQ M
GLUCOSE SERPL-MCNC: 89 MG/DL (ref 65–140)
HCT VFR BLD AUTO: 37.3 % (ref 34.8–46.1)
HGB BLD-MCNC: 11.9 G/DL (ref 11.5–15.4)
IMM GRANULOCYTES # BLD AUTO: 0.01 THOUSAND/UL (ref 0–0.2)
IMM GRANULOCYTES NFR BLD AUTO: 0 % (ref 0–2)
LIPASE SERPL-CCNC: 83 U/L (ref 73–393)
LYMPHOCYTES # BLD AUTO: 1.57 THOUSANDS/ΜL (ref 0.6–4.47)
LYMPHOCYTES NFR BLD AUTO: 27 % (ref 14–44)
MAGNESIUM SERPL-MCNC: 2.1 MG/DL (ref 1.6–2.6)
MCH RBC QN AUTO: 27.9 PG (ref 26.8–34.3)
MCHC RBC AUTO-ENTMCNC: 31.9 G/DL (ref 31.4–37.4)
MCV RBC AUTO: 87 FL (ref 82–98)
MONOCYTES # BLD AUTO: 0.55 THOUSAND/ΜL (ref 0.17–1.22)
MONOCYTES NFR BLD AUTO: 10 % (ref 4–12)
NEUTROPHILS # BLD AUTO: 3.37 THOUSANDS/ΜL (ref 1.85–7.62)
NEUTS SEG NFR BLD AUTO: 58 % (ref 43–75)
NRBC BLD AUTO-RTO: 0 /100 WBCS
PLATELET # BLD AUTO: 212 THOUSANDS/UL (ref 149–390)
PMV BLD AUTO: 10.7 FL (ref 8.9–12.7)
POTASSIUM SERPL-SCNC: 4.5 MMOL/L (ref 3.5–5.3)
PROT SERPL-MCNC: 7.1 G/DL (ref 6.4–8.2)
RBC # BLD AUTO: 4.27 MILLION/UL (ref 3.81–5.12)
SODIUM SERPL-SCNC: 141 MMOL/L (ref 136–145)
TROPONIN I SERPL-MCNC: <0.02 NG/ML
TROPONIN I SERPL-MCNC: <0.02 NG/ML
TSH SERPL DL<=0.05 MIU/L-ACNC: 2.18 UIU/ML (ref 0.36–3.74)
WBC # BLD AUTO: 5.74 THOUSAND/UL (ref 4.31–10.16)

## 2020-02-01 PROCEDURE — 83690 ASSAY OF LIPASE: CPT | Performed by: EMERGENCY MEDICINE

## 2020-02-01 PROCEDURE — 1036F TOBACCO NON-USER: CPT | Performed by: PREVENTIVE MEDICINE

## 2020-02-01 PROCEDURE — 94640 AIRWAY INHALATION TREATMENT: CPT

## 2020-02-01 PROCEDURE — 99285 EMERGENCY DEPT VISIT HI MDM: CPT

## 2020-02-01 PROCEDURE — 99285 EMERGENCY DEPT VISIT HI MDM: CPT | Performed by: EMERGENCY MEDICINE

## 2020-02-01 PROCEDURE — 96361 HYDRATE IV INFUSION ADD-ON: CPT

## 2020-02-01 PROCEDURE — 99213 OFFICE O/P EST LOW 20 MIN: CPT | Performed by: PREVENTIVE MEDICINE

## 2020-02-01 PROCEDURE — 71275 CT ANGIOGRAPHY CHEST: CPT

## 2020-02-01 PROCEDURE — 84484 ASSAY OF TROPONIN QUANT: CPT | Performed by: EMERGENCY MEDICINE

## 2020-02-01 PROCEDURE — 85025 COMPLETE CBC W/AUTO DIFF WBC: CPT | Performed by: EMERGENCY MEDICINE

## 2020-02-01 PROCEDURE — 36415 COLL VENOUS BLD VENIPUNCTURE: CPT | Performed by: EMERGENCY MEDICINE

## 2020-02-01 PROCEDURE — 71046 X-RAY EXAM CHEST 2 VIEWS: CPT

## 2020-02-01 PROCEDURE — 80053 COMPREHEN METABOLIC PANEL: CPT | Performed by: EMERGENCY MEDICINE

## 2020-02-01 PROCEDURE — 84443 ASSAY THYROID STIM HORMONE: CPT | Performed by: EMERGENCY MEDICINE

## 2020-02-01 PROCEDURE — 96360 HYDRATION IV INFUSION INIT: CPT

## 2020-02-01 PROCEDURE — 85379 FIBRIN DEGRADATION QUANT: CPT | Performed by: EMERGENCY MEDICINE

## 2020-02-01 PROCEDURE — 83735 ASSAY OF MAGNESIUM: CPT | Performed by: EMERGENCY MEDICINE

## 2020-02-01 PROCEDURE — 3074F SYST BP LT 130 MM HG: CPT | Performed by: PREVENTIVE MEDICINE

## 2020-02-01 PROCEDURE — 3078F DIAST BP <80 MM HG: CPT | Performed by: PREVENTIVE MEDICINE

## 2020-02-01 RX ORDER — IPRATROPIUM BROMIDE AND ALBUTEROL SULFATE 2.5; .5 MG/3ML; MG/3ML
3 SOLUTION RESPIRATORY (INHALATION) ONCE
Status: COMPLETED | OUTPATIENT
Start: 2020-02-01 | End: 2020-02-01

## 2020-02-01 RX ADMIN — IOHEXOL 85 ML: 350 INJECTION, SOLUTION INTRAVENOUS at 15:34

## 2020-02-01 RX ADMIN — SODIUM CHLORIDE 1000 ML: 0.9 INJECTION, SOLUTION INTRAVENOUS at 14:14

## 2020-02-01 RX ADMIN — IPRATROPIUM BROMIDE AND ALBUTEROL SULFATE 3 ML: 2.5; .5 SOLUTION RESPIRATORY (INHALATION) at 14:11

## 2020-02-01 NOTE — PATIENT INSTRUCTIONS
Shortness of Breath   AMBULATORY CARE:   Shortness of breath  is a feeling that you cannot get enough air when you breathe in  You may have this feeling only during activity, or all the time  Your symptoms can range from mild to severe  Shortness of breath may be a sign of a serious health condition that needs immediate care  Seek care immediately if:   · Your signs and symptoms are the same or worse within 24 hours of treatment  · The skin over your ribs or on your neck sinks in when you breathe  · You feel confused or dizzy  Contact your healthcare provider if:   · You have new or worsening symptoms  · You have questions or concerns about your condition or care  Treatment:   · Medicines  may be used to treat the cause of your symptoms  You may need medicine to treat a bacterial infection or reduce anxiety  Other medicines may be used to open your airway, reduce swelling, or remove extra fluid  If you have a heart condition, you may need medicine to help your heart beat more strongly or regularly  · Oxygen  may be given to help you breathe more easily  Manage shortness of breath:   · Create an action plan  You and your healthcare provider can work together to create a plan for how to handle shortness of breath  The plan can include daily activities, treatment changes, and what to do if you have severe breathing problems  · Lean forward on your elbows when you sit  This helps your lungs expand and may make it easier to breathe  · Use pursed-lip breathing any time you feel short of breath  Breathe in through your nose and then slowly breathe out through your mouth with your lips slightly puckered  It should take you twice as long to breathe out as it did to breathe in  · Do not smoke  Nicotine and other chemicals in cigarettes and cigars can cause lung damage and make shortness of breath worse   Ask your healthcare provider for information if you currently smoke and need help to quit  E-cigarettes or smokeless tobacco still contain nicotine  Talk to your healthcare provider before you use these products  · Reach or maintain a healthy weight  Your healthcare provider can help you create a safe weight loss plan if you are overweight  · Exercise as directed  Exercise can help your lungs work more easily  Exercise can also help you lose weight if needed  Try to get at least 30 minutes of exercise most days of the week  Follow up with your healthcare provider or specialist as directed:  Write down your questions so you remember to ask them during your visits  © 2017 [facility] Information is for End User's use only and may not be sold, redistributed or otherwise used for commercial purposes  All illustrations and images included in CareNotes® are the copyrighted property of [employer]  or Satish Abdalla  The above information is an  only  It is not intended as medical advice for individual conditions or treatments  Talk to your doctor, nurse or pharmacist before following any medical regimen to see if it is safe and effective for you      Refer to ER for heart condition

## 2020-02-01 NOTE — ED PROVIDER NOTES
History  Chief Complaint   Patient presents with    Chest Pain     pt presents to the ed with chest pain and paliations "that comes and goes" pt states she was just dx with " restrictive lung disease"     Palpitations       History provided by:  Patient   used: No      Patient sent to emergency department by urgent care for further evaluation of intermittent chest pain and palpitations  Patient recent diagnosed with restrictive lung disease  Recently started on inhaler  Denies any chest pain at this time  Reports intermittent palpitations and chest pain as well as presyncopal symptoms  Has had outpatient workup  Denies any recent falls or trauma  Prior to Admission Medications   Prescriptions Last Dose Informant Patient Reported? Taking?    DULoxetine (CYMBALTA) 60 mg delayed release capsule   No No   Sig: Take 1 capsule (60 mg total) by mouth daily   QUEtiapine (SEROquel) 50 mg tablet   Yes No   Sig: Take 50 mg by mouth daily at bedtime   albuterol (PROVENTIL HFA,VENTOLIN HFA) 90 mcg/act inhaler   No No   Sig: Inhale 2 puffs every 4 (four) hours as needed for wheezing   atorvastatin (LIPITOR) 20 mg tablet   Yes No   Sig: Take 40 mg by mouth daily   calcium carbonate-vitamin D (OSCAL-D) 500 mg-200 units per tablet   No No   Sig: Take 1 tablet by mouth daily with breakfast   gabapentin (NEURONTIN) 300 mg capsule   No No   Sig: Take 2 capsules (600 mg total) by mouth daily at bedtime   levothyroxine 50 mcg tablet   No No   Sig: Take 1 tablet (50 mcg total) by mouth daily   meclizine (ANTIVERT) 25 mg tablet   Yes No   Sig: take 1 tablet by mouth every 6 hours if needed  IF DIZZINESS   midodrine (PROAMATINE) 10 MG tablet  Self Yes No   Sig: Take 10 mg by mouth 2 (two) times a day   omeprazole (PriLOSEC) 40 MG capsule   No No   Sig: take 1 capsule by mouth once daily  BEFORE BREAKFAST   traZODone (DESYREL) 50 mg tablet  Self Yes No   Sig: Take 50 mg by mouth daily at bedtime as needed for sleep      Facility-Administered Medications: None       Past Medical History:   Diagnosis Date    Abdominal adhesions     Anesthesia complication     difficult to wake up    Anxiety     Depression     Depression     Disease of thyroid gland     Fibromyalgia     GERD (gastroesophageal reflux disease)     Lazy eye     resolved: 3/27/17    Osteopenia     with joint pain-elevated DEV    Psychiatric disorder     Tinnitus     Wears glasses     for reading       Past Surgical History:   Procedure Laterality Date    ABDOMINAL SURGERY      lysis of adhesions x 2    APPENDECTOMY      CERVICAL FUSION      CHOLECYSTECTOMY      open    DILATION AND CURETTAGE OF UTERUS      NEUROMA EXCISION Right 5/26/2017    Procedure: EXCISION MASS / FIBROMA FOOT;  Surgeon: Mely Iqbal DPM;  Location: [facility];  Service:     RIGHT OOPHORECTOMY      WISDOM TOOTH EXTRACTION      x4       Family History   Problem Relation Age of Onset    Heart disease Mother     Stroke Mother 58    COPD Mother     Arthritis Mother     Hypertension Father     Kidney disease Brother         kidney transplant    No Known Problems Sister     No Known Problems Maternal Aunt     No Known Problems Maternal Uncle     No Known Problems Paternal Aunt     No Known Problems Paternal Uncle     No Known Problems Maternal Grandmother     No Known Problems Maternal Grandfather     No Known Problems Paternal Grandmother     No Known Problems Paternal Grandfather     ADD / ADHD Neg Hx     Anesthesia problems Neg Hx     Cancer Neg Hx     Clotting disorder Neg Hx     Collagen disease Neg Hx     Diabetes Neg Hx     Dislocations Neg Hx     Learning disabilities Neg Hx     Neurological problems Neg Hx     Osteoporosis Neg Hx     Rheumatologic disease Neg Hx     Scoliosis Neg Hx     Vascular Disease Neg Hx      I have reviewed and agree with the history as documented      Social History     Tobacco Use    Smoking status: Never Smoker  Smokeless tobacco: Never Used   Substance Use Topics    Alcohol use: Not Currently     Frequency: Never     Binge frequency: Never    Drug use: No        Review of Systems   Constitutional: Negative for activity change, appetite change, chills, diaphoresis, fatigue and fever  HENT: Negative for congestion, dental problem, ear discharge, facial swelling, nosebleeds, rhinorrhea, sinus pressure and trouble swallowing  Eyes: Negative for photophobia, discharge, itching and visual disturbance  Respiratory: Positive for cough and wheezing  Negative for choking, chest tightness and shortness of breath  Cardiovascular: Positive for palpitations  Negative for chest pain and leg swelling  Gastrointestinal: Negative for abdominal distention, abdominal pain, constipation, diarrhea, nausea and vomiting  Endocrine: Negative for polydipsia and polyphagia  Genitourinary: Negative for decreased urine volume, difficulty urinating, dysuria, flank pain, frequency, hematuria, vaginal bleeding and vaginal discharge  Musculoskeletal: Negative for back pain, gait problem, joint swelling, neck pain and neck stiffness  Skin: Negative for color change and rash  Neurological: Negative for dizziness, facial asymmetry, speech difficulty, weakness and light-headedness  Psychiatric/Behavioral: Negative for agitation and behavioral problems  The patient is not nervous/anxious and is not hyperactive  All other systems reviewed and are negative  Physical Exam  Physical Exam   Constitutional: She is oriented to person, place, and time  She appears well-developed and well-nourished  No distress  HENT:   Head: Normocephalic and atraumatic  Eyes: Pupils are equal, round, and reactive to light  EOM are normal    Neck: Normal range of motion  Neck supple  Cardiovascular: Normal rate, regular rhythm and normal heart sounds  No murmur heard  Pulmonary/Chest: Effort normal  No respiratory distress   She has wheezes  She has no rales  Abdominal: Soft  Bowel sounds are normal  She exhibits no distension  There is no tenderness  There is no rebound and no guarding  Musculoskeletal: Normal range of motion  She exhibits no edema or deformity  Lymphadenopathy:     She has no cervical adenopathy  Neurological: She is alert and oriented to person, place, and time  No cranial nerve deficit  She exhibits normal muscle tone  Coordination normal    Skin: Capillary refill takes less than 2 seconds  No rash noted  No erythema  Psychiatric: She has a normal mood and affect  Her behavior is normal    Nursing note and vitals reviewed        Vital Signs  ED Triage Vitals   Temperature Pulse Respirations Blood Pressure SpO2   02/01/20 1327 02/01/20 1327 02/01/20 1327 02/01/20 1327 02/01/20 1327   (!) 97 °F (36 1 °C) 89 20 147/87 97 %      Temp Source Heart Rate Source Patient Position - Orthostatic VS BP Location FiO2 (%)   02/01/20 1327 02/01/20 1327 02/01/20 1515 02/01/20 1515 --   Tympanic Monitor Sitting Right arm       Pain Score       --                  Vitals:    02/01/20 1545 02/01/20 1630 02/01/20 1645 02/01/20 1700   BP:       Pulse: 96 94 82 88   Patient Position - Orthostatic VS:             Visual Acuity      ED Medications  Medications   sodium chloride 0 9 % bolus 1,000 mL (0 mL Intravenous Stopped 2/1/20 1601)   ipratropium-albuterol (DUO-NEB) 0 5-2 5 mg/3 mL inhalation solution 3 mL (3 mL Nebulization Given 2/1/20 1411)   iohexol (OMNIPAQUE) 350 MG/ML injection (MULTI-DOSE) 85 mL (85 mL Intravenous Given 2/1/20 1534)       Diagnostic Studies  Results Reviewed     Procedure Component Value Units Date/Time    Troponin I [[id number]]  (Normal) Collected:  02/01/20 1630    Lab Status:  Final result Specimen:  Blood from Arm, Left Updated:  02/01/20 1653     Troponin I <0 02 ng/mL     D-Dimer [[id number]]  (Abnormal) Collected:  02/01/20 1403    Lab Status:  Final result Specimen:  Blood from Arm, Right Updated: 02/01/20 1440     D-Dimer, Quant 0 82 ug/ml FEU     TSH, 3rd generation with Free T4 reflex [[id number]]  (Normal) Collected:  02/01/20 1403    Lab Status:  Final result Specimen:  Blood from Arm, Right Updated:  02/01/20 1437     TSH 3RD GENERATON 2 180 uIU/mL     Narrative:       Patients undergoing fluorescein dye angiography may retain small amounts of fluorescein in the body for 48-72 hours post procedure  Samples containing fluorescein can produce falsely depressed TSH values  If the patient had this procedure,a specimen should be resubmitted post fluorescein clearance        Magnesium [[id number]]  (Normal) Collected:  02/01/20 1403    Lab Status:  Final result Specimen:  Blood from Arm, Right Updated:  02/01/20 1437     Magnesium 2 1 mg/dL     Lipase [[id number]]  (Normal) Collected:  02/01/20 1403    Lab Status:  Final result Specimen:  Blood from Arm, Right Updated:  02/01/20 1437     Lipase 83 u/L     Troponin I [[id number]]  (Normal) Collected:  02/01/20 1403    Lab Status:  Final result Specimen:  Blood from Arm, Right Updated:  02/01/20 1430     Troponin I <0 02 ng/mL     Comprehensive metabolic panel [[id number]] Collected:  02/01/20 1403    Lab Status:  Final result Specimen:  Blood from Arm, Right Updated:  02/01/20 1428     Sodium 141 mmol/L      Potassium 4 5 mmol/L      Chloride 105 mmol/L      CO2 29 mmol/L      ANION GAP 7 mmol/L      BUN 13 mg/dL      Creatinine 0 98 mg/dL      Glucose 89 mg/dL      Calcium 8 9 mg/dL      AST 25 U/L      ALT 33 U/L      Alkaline Phosphatase 77 U/L      Total Protein 7 1 g/dL      Albumin 3 5 g/dL      Total Bilirubin 0 30 mg/dL      eGFR 62 ml/min/1 73sq m     Narrative:       [facility] guidelines for Chronic Kidney Disease (CKD):     Stage 1 with normal or high GFR (GFR > 90 mL/min/1 73 square meters)    Stage 2 Mild CKD (GFR = 60-89 mL/min/1 73 square meters)    Stage 3A Moderate CKD (GFR = 45-59 mL/min/1 73 square meters)    Stage 3B Moderate CKD (GFR = 30-44 mL/min/1 73 square meters)    Stage 4 Severe CKD (GFR = 15-29 mL/min/1 73 square meters)    Stage 5 End Stage CKD (GFR <15 mL/min/1 73 square meters)  Note: GFR calculation is accurate only with a steady state creatinine    CBC and differential [[id number]] Collected:  02/01/20 1403    Lab Status:  Final result Specimen:  Blood from Arm, Right Updated:  02/01/20 1411     WBC 5 74 Thousand/uL      RBC 4 27 Million/uL      Hemoglobin 11 9 g/dL      Hematocrit 37 3 %      MCV 87 fL      MCH 27 9 pg      MCHC 31 9 g/dL      RDW 13 5 %      MPV 10 7 fL      Platelets 748 Thousands/uL      nRBC 0 /100 WBCs      Neutrophils Relative 58 %      Immat GRANS % 0 %      Lymphocytes Relative 27 %      Monocytes Relative 10 %      Eosinophils Relative 4 %      Basophils Relative 1 %      Neutrophils Absolute 3 37 Thousands/µL      Immature Grans Absolute 0 01 Thousand/uL      Lymphocytes Absolute 1 57 Thousands/µL      Monocytes Absolute 0 55 Thousand/µL      Eosinophils Absolute 0 21 Thousand/µL      Basophils Absolute 0 03 Thousands/µL                  CTA ED chest PE Study   Final Result by Cameron Aburto MD (02/01 1601)      There is no pulmonary embolism  Again seen is mosaic pattern groundglass opacities throughout the lung  This is probably due to air trapping, small airways disease                    Workstation performed: [id number]                    Procedures  ECG 12 Lead Documentation Only  Date/Time: 2/1/2020 5:08 PM  Performed by: Brandt Millan MD  Authorized by: Brandt Millan MD     Indications / Diagnosis:  Palpitations  Patient location:  ED  Rate:     ECG rate:  85    ECG rate assessment: normal    Rhythm:     Rhythm: sinus rhythm    Ectopy:     Ectopy: none    QRS:     QRS axis:  Normal    QRS intervals:  Normal  Conduction:     Conduction: normal    ST segments:     ST segments:  Normal  T waves:     T waves: inverted      Inverted:  III             ED Course         HEART Risk Score      Most Recent Value   History  0 Filed at: 02/01/2020 1654   ECG  1 Filed at: 02/01/2020 1654   Age  1 Filed at: 02/01/2020 1654   Risk Factors  0 Filed at: 02/01/2020 1654   Troponin  0 Filed at: 02/01/2020 1654   Heart Score Risk Calculator   History  0 Filed at: 02/01/2020 1654   ECG  1 Filed at: 02/01/2020 1654   Age  1 Filed at: 02/01/2020 1654   Risk Factors  0 Filed at: 02/01/2020 1654   Troponin  0 Filed at: 02/01/2020 1654   HEART Score  2 Filed at: 02/01/2020 1654   HEART Score  2 Filed at: 02/01/2020 1654                            MDM  Number of Diagnoses or Management Options  Atypical chest pain: new and requires workup  Diagnosis management comments: Palpitations, intermittent chest pain  Heart score 2  EKG with inverted T-wave in lead 3, similar to previous  No new EKG findings  Troponin x2 negative  No additional ER workup indicated for chest pain  PCP and cardiology outpatient follow-up recommended  D-dimer elevated, CT PE study obtained which showed no signs of pulmonary embolism  Patient's wheezing improved with DuoNeb treatment  Recommended continued outpatient management  Possibly related to bronchitis  Stable at time of discharge home         Amount and/or Complexity of Data Reviewed  Clinical lab tests: ordered and reviewed  Tests in the radiology section of CPT®: reviewed and ordered  Tests in the medicine section of CPT®: reviewed and ordered    Risk of Complications, Morbidity, and/or Mortality  Presenting problems: high  Diagnostic procedures: moderate  Management options: high    Patient Progress  Patient progress: improved        Disposition  Final diagnoses:   Atypical chest pain     Time reflects when diagnosis was documented in both MDM as applicable and the Disposition within this note     Time User Action Codes Description Comment    2/1/2020  4:58 PM Bhavin Arriaza Add [R07 89] Atypical chest pain       ED Disposition     ED Disposition Condition Date/Time Comment    Discharge Stable Sat Feb 1, 2020  4:58 PM Butch Gallagher discharge to home/self care  Follow-up Information     Follow up With Specialties Details Why Contact Info    Seth Hay MD Internal Medicine, Family Medicine Schedule an appointment as soon as possible for a visit in 2 days As needed [address] [facility]  [address] [facility] [address]  [phone]            Discharge Medication List as of 2/1/2020  4:58 PM      CONTINUE these medications which have NOT CHANGED    Details   albuterol (PROVENTIL HFA,VENTOLIN HFA) 90 mcg/act inhaler Inhale 2 puffs every 4 (four) hours as needed for wheezing, Starting Wed 12/18/2019, Print      atorvastatin (LIPITOR) 20 mg tablet Take 40 mg by mouth daily, Starting Thu 11/21/2019, Historical Med      calcium carbonate-vitamin D (OSCAL-D) 500 mg-200 units per tablet Take 1 tablet by mouth daily with breakfast, Starting Thu 10/3/2019, Print      DULoxetine (CYMBALTA) 60 mg delayed release capsule Take 1 capsule (60 mg total) by mouth daily, Starting Thu 10/3/2019, Print      gabapentin (NEURONTIN) 300 mg capsule Take 2 capsules (600 mg total) by mouth daily at bedtime, Starting Thu 10/3/2019, Print      levothyroxine 50 mcg tablet Take 1 tablet (50 mcg total) by mouth daily, Starting Tue 11/26/2019, Normal      meclizine (ANTIVERT) 25 mg tablet take 1 tablet by mouth every 6 hours if needed  IF DIZZINESS, Historical Med      midodrine (PROAMATINE) 10 MG tablet Take 10 mg by mouth 2 (two) times a day, Historical Med      omeprazole (PriLOSEC) 40 MG capsule take 1 capsule by mouth once daily  BEFORE BREAKFAST, Normal      QUEtiapine (SEROquel) 50 mg tablet Take 50 mg by mouth daily at bedtime, Historical Med      traZODone (DESYREL) 50 mg tablet Take 50 mg by mouth daily at bedtime as needed for sleep, Historical Med           No discharge procedures on file      ED Provider  Electronically Signed by           Dev Ga MD  02/01/20 2584

## 2020-02-03 ENCOUNTER — OFFICE VISIT (OUTPATIENT)
Dept: FAMILY MEDICINE CLINIC | Facility: CLINIC | Age: 65
End: 2020-02-03
Payer: COMMERCIAL

## 2020-02-03 VITALS
TEMPERATURE: 98.4 F | BODY MASS INDEX: 29.53 KG/M2 | HEART RATE: 57 BPM | WEIGHT: 173 LBS | OXYGEN SATURATION: 98 % | SYSTOLIC BLOOD PRESSURE: 110 MMHG | HEIGHT: 64 IN | DIASTOLIC BLOOD PRESSURE: 60 MMHG | RESPIRATION RATE: 18 BRPM

## 2020-02-03 DIAGNOSIS — J20.9 ACUTE BRONCHITIS, UNSPECIFIED ORGANISM: Primary | ICD-10-CM

## 2020-02-03 PROCEDURE — 3078F DIAST BP <80 MM HG: CPT | Performed by: NURSE PRACTITIONER

## 2020-02-03 PROCEDURE — 3074F SYST BP LT 130 MM HG: CPT | Performed by: NURSE PRACTITIONER

## 2020-02-03 PROCEDURE — 96372 THER/PROPH/DIAG INJ SC/IM: CPT

## 2020-02-03 PROCEDURE — 99214 OFFICE O/P EST MOD 30 MIN: CPT | Performed by: NURSE PRACTITIONER

## 2020-02-03 PROCEDURE — 1036F TOBACCO NON-USER: CPT | Performed by: NURSE PRACTITIONER

## 2020-02-03 PROCEDURE — 94640 AIRWAY INHALATION TREATMENT: CPT | Performed by: NURSE PRACTITIONER

## 2020-02-03 PROCEDURE — 3008F BODY MASS INDEX DOCD: CPT | Performed by: NURSE PRACTITIONER

## 2020-02-03 RX ORDER — IPRATROPIUM BROMIDE AND ALBUTEROL SULFATE 2.5; .5 MG/3ML; MG/3ML
3 SOLUTION RESPIRATORY (INHALATION) 4 TIMES DAILY
Qty: 100 ML | Refills: 0 | Status: SHIPPED | OUTPATIENT
Start: 2020-02-03 | End: 2020-07-06 | Stop reason: SDUPTHER

## 2020-02-03 RX ORDER — METHYLPREDNISOLONE ACETATE 80 MG/ML
80 INJECTION, SUSPENSION INTRA-ARTICULAR; INTRALESIONAL; INTRAMUSCULAR; SOFT TISSUE ONCE
Status: COMPLETED | OUTPATIENT
Start: 2020-02-03 | End: 2020-02-03

## 2020-02-03 RX ORDER — BRIMONIDINE TARTRATE 0.1 %
1 DROPS OPHTHALMIC (EYE) 2 TIMES DAILY
COMMUNITY
Start: 2020-01-29 | End: 2020-10-07 | Stop reason: ALTCHOICE

## 2020-02-03 RX ORDER — IPRATROPIUM BROMIDE AND ALBUTEROL SULFATE 2.5; .5 MG/3ML; MG/3ML
3 SOLUTION RESPIRATORY (INHALATION) ONCE
Status: COMPLETED | OUTPATIENT
Start: 2020-02-03 | End: 2020-02-03

## 2020-02-03 RX ORDER — PREDNISONE 20 MG/1
TABLET ORAL
Qty: 18 TABLET | Refills: 0 | Status: SHIPPED | OUTPATIENT
Start: 2020-02-03 | End: 2020-02-12

## 2020-02-03 RX ADMIN — METHYLPREDNISOLONE ACETATE 80 MG: 80 INJECTION, SUSPENSION INTRA-ARTICULAR; INTRALESIONAL; INTRAMUSCULAR; SOFT TISSUE at 13:39

## 2020-02-03 RX ADMIN — IPRATROPIUM BROMIDE AND ALBUTEROL SULFATE 3 ML: 2.5; .5 SOLUTION RESPIRATORY (INHALATION) at 13:40

## 2020-02-03 NOTE — PROGRESS NOTES
Assessment/Plan:    Depo Medrol given in office today  Will start on Prednsione beginning tomorrow  She will start Duoneb nebulizers 4 times daily  RTO in 48 hours for recheck  ER precautions reviewed  1  Acute bronchitis, unspecified organism  -     ipratropium-albuterol (DUO-NEB) 0 5-2 5 mg/3 mL inhalation solution 3 mL  -     Mini neb  -     methylPREDNISolone acetate (DEPO-MEDROL) injection 80 mg  -     ipratropium-albuterol (DUO-NEB) 0 5-2 5 mg/3 mL nebulizer solution; Take 1 vial (3 mL total) by nebulization 4 (four) times a day  -     predniSONE 20 mg tablet; 3 pills daily x 3 days, 2 pills daily x 3 days, 1 pill daily x 3 days, 1/2 tablet daily for 4 days  Take with food    Mini neb  Performed by: SHRAVAN Barkley  Authorized by: SHRAVAN Barkley     Number of treatments:  1  Treatment 1:   Pre-Procedure     Symptoms:  Wheezing    Medication Administered:  Duoneb - Albuterol 2 5 mg/Atrovent 0 5 mg  Post-Procedure     Symptoms:  Wheezing    Lung sounds:  Improved aeration               There are no Patient Instructions on file for this visit  Return in about 2 days (around 2/5/2020)  Subjective:      Patient ID: Asmita Rosen is a 62 y o  female  Chief Complaint   Patient presents with   [address] ER  breathing difficulties  [id number]    Dizziness     for the past 4 days     fainted twice on friday    Neck Pain       Here today for f/u shortness of breath and cough  She was seen in the office last week and prescribed an inhaler  She fainted 3 days ago  She felt that her wheezing was getting worse  The next day, she went to urgent care and was sent to the ER  CT chest was done as well as EKG and labs  She has pains in her upper chest and in her neck  Her wheezing is getting worse  She coughs when she doesn't get enough air  She feels dizzy frequently and feels fatigued  She has been using the inhaler, which provides relief for maybe 30 minutes  She had better results with the nebulizer that was given in the hospital    She does have an appt with pulmonary scheduled for next month, which was the earliest appt available  The following portions of the patient's history were reviewed and updated as appropriate: allergies, current medications, past family history, past medical history, past social history, past surgical history and problem list     Review of Systems   Constitutional: Positive for fatigue  Negative for chills and fever  HENT: Negative for congestion, ear pain, postnasal drip, rhinorrhea, sinus pressure and sore throat  Respiratory: Positive for cough, shortness of breath and wheezing  Cardiovascular: Positive for chest pain  Gastrointestinal: Negative for abdominal pain, diarrhea, nausea and vomiting  Musculoskeletal: Negative for arthralgias  Skin: Negative for rash  Neurological: Positive for dizziness and weakness  Negative for headaches           Current Outpatient Medications   Medication Sig Dispense Refill    albuterol (PROVENTIL HFA,VENTOLIN HFA) 90 mcg/act inhaler Inhale 2 puffs every 4 (four) hours as needed for wheezing 1 Inhaler 0    atorvastatin (LIPITOR) 20 mg tablet Take 40 mg by mouth daily  0    calcium carbonate-vitamin D (OSCAL-D) 500 mg-200 units per tablet Take 1 tablet by mouth daily with breakfast 30 tablet 0    DULoxetine (CYMBALTA) 60 mg delayed release capsule Take 1 capsule (60 mg total) by mouth daily 30 capsule 0    gabapentin (NEURONTIN) 300 mg capsule Take 2 capsules (600 mg total) by mouth daily at bedtime 60 capsule 0    levothyroxine 50 mcg tablet Take 1 tablet (50 mcg total) by mouth daily 30 tablet 3    meclizine (ANTIVERT) 25 mg tablet take 1 tablet by mouth every 6 hours if needed  IF DIZZINESS  0    midodrine (PROAMATINE) 10 MG tablet Take 10 mg by mouth 2 (two) times a day      omeprazole (PriLOSEC) 40 MG capsule take 1 capsule by mouth once daily  BEFORE BREAKFAST 30 capsule 5  QUEtiapine (SEROquel) 50 mg tablet Take 50 mg by mouth daily at bedtime      traZODone (DESYREL) 50 mg tablet Take 50 mg by mouth daily at bedtime as needed for sleep      ALPHAGAN P 0 1 % 2 (two) times a day      ipratropium-albuterol (DUO-NEB) 0 5-2 5 mg/3 mL nebulizer solution Take 1 vial (3 mL total) by nebulization 4 (four) times a day 100 mL 0    predniSONE 20 mg tablet 3 pills daily x 3 days, 2 pills daily x 3 days, 1 pill daily x 3 days, 1/2 tablet daily for 4 days  Take with food 18 tablet 0     No current facility-administered medications for this visit  Objective:    /60   Pulse 57   Temp 98 4 °F (36 9 °C)   Resp 18   Ht 5' 4" (1 626 m)   Wt 78 5 kg (173 lb)   LMP  (LMP Unknown)   SpO2 98%   BMI 29 70 kg/m²        Physical Exam   Constitutional: She appears well-developed and well-nourished  HENT:   Head: Normocephalic and atraumatic  Right Ear: Tympanic membrane, external ear and ear canal normal    Left Ear: Tympanic membrane, external ear and ear canal normal    Nose: No mucosal edema or rhinorrhea  Mouth/Throat: Uvula is midline, oropharynx is clear and moist and mucous membranes are normal    Eyes: Conjunctivae are normal    Neck: Neck supple  No edema present  No thyromegaly present  Cardiovascular: Normal rate, regular rhythm, normal heart sounds and intact distal pulses  No murmur heard  Pulmonary/Chest: Effort normal  She has wheezes (expiratory)  Abdominal: Bowel sounds are normal  She exhibits no distension  There is no splenomegaly or hepatomegaly  There is no tenderness  Lymphadenopathy:        Right cervical: No superficial cervical adenopathy present  Left cervical: No superficial cervical adenopathy present  Skin: Skin is warm, dry and intact  No rash noted  Psychiatric: She has a normal mood and affect  Nursing note and vitals reviewed               Cliff Choudhary

## 2020-02-04 LAB
ATRIAL RATE: 85 BPM
P AXIS: 44 DEGREES
PR INTERVAL: 166 MS
QRS AXIS: -19 DEGREES
QRSD INTERVAL: 74 MS
QT INTERVAL: 376 MS
QTC INTERVAL: 447 MS
T WAVE AXIS: 10 DEGREES
VENTRICULAR RATE: 85 BPM

## 2020-02-04 PROCEDURE — 93010 ELECTROCARDIOGRAM REPORT: CPT | Performed by: PREVENTIVE MEDICINE

## 2020-02-05 ENCOUNTER — OFFICE VISIT (OUTPATIENT)
Dept: FAMILY MEDICINE CLINIC | Facility: CLINIC | Age: 65
End: 2020-02-05
Payer: COMMERCIAL

## 2020-02-05 VITALS
SYSTOLIC BLOOD PRESSURE: 110 MMHG | DIASTOLIC BLOOD PRESSURE: 60 MMHG | RESPIRATION RATE: 16 BRPM | BODY MASS INDEX: 28.85 KG/M2 | OXYGEN SATURATION: 98 % | HEART RATE: 84 BPM | TEMPERATURE: 97.6 F | HEIGHT: 64 IN | WEIGHT: 169 LBS

## 2020-02-05 DIAGNOSIS — J20.9 ACUTE BRONCHITIS, UNSPECIFIED ORGANISM: Primary | ICD-10-CM

## 2020-02-05 PROCEDURE — 3008F BODY MASS INDEX DOCD: CPT | Performed by: NURSE PRACTITIONER

## 2020-02-05 PROCEDURE — 1036F TOBACCO NON-USER: CPT | Performed by: NURSE PRACTITIONER

## 2020-02-05 PROCEDURE — 3074F SYST BP LT 130 MM HG: CPT | Performed by: NURSE PRACTITIONER

## 2020-02-05 PROCEDURE — 3078F DIAST BP <80 MM HG: CPT | Performed by: NURSE PRACTITIONER

## 2020-02-05 PROCEDURE — 99213 OFFICE O/P EST LOW 20 MIN: CPT | Performed by: NURSE PRACTITIONER

## 2020-02-05 RX ORDER — QUETIAPINE FUMARATE 100 MG/1
100 TABLET, FILM COATED ORAL
COMMUNITY
Start: 2020-02-03 | End: 2020-07-06

## 2020-02-05 NOTE — PROGRESS NOTES
Assessment/Plan:    Significant improvement in symptoms  Will continue with Prednisone and nebulizer  Advised recheck if symptoms worsen or do not resolve upon completion of medication     1  Acute bronchitis, unspecified organism            There are no Patient Instructions on file for this visit  Return if symptoms worsen or fail to improve  Subjective:      Patient ID: Tracy Puente is a 62 y o  female  Chief Complaint   Patient presents with    Follow-up     was seen two days ago for bronchitis, follow up noe        Here today for f/u bronchitis  She has been using her nebulizer 3-4 times per day and taking Prednisone as prescribed  She notes significant improvement in her symptoms  She is wheezing and coughing less  She does not feel as fatigued, although does complain of occasional dizziness  The following portions of the patient's history were reviewed and updated as appropriate: allergies, current medications, past family history, past medical history, past social history, past surgical history and problem list     Review of Systems   Constitutional: Negative for chills, fatigue and fever  HENT: Negative for congestion, ear pain, postnasal drip, rhinorrhea, sinus pressure and sore throat  Respiratory: Positive for cough and wheezing  Negative for shortness of breath  Cardiovascular: Negative for chest pain  Gastrointestinal: Negative for abdominal pain, diarrhea, nausea and vomiting  Musculoskeletal: Negative for arthralgias  Skin: Negative for rash  Neurological: Negative for headaches           Current Outpatient Medications   Medication Sig Dispense Refill    albuterol (PROVENTIL HFA,VENTOLIN HFA) 90 mcg/act inhaler Inhale 2 puffs every 4 (four) hours as needed for wheezing 1 Inhaler 0    ALPHAGAN P 0 1 % 2 (two) times a day      atorvastatin (LIPITOR) 20 mg tablet Take 40 mg by mouth daily  0    calcium carbonate-vitamin D (OSCAL-D) 500 mg-200 units per tablet Take 1 tablet by mouth daily with breakfast 30 tablet 0    DULoxetine (CYMBALTA) 60 mg delayed release capsule Take 1 capsule (60 mg total) by mouth daily 30 capsule 0    gabapentin (NEURONTIN) 300 mg capsule Take 2 capsules (600 mg total) by mouth daily at bedtime 60 capsule 0    ipratropium-albuterol (DUO-NEB) 0 5-2 5 mg/3 mL nebulizer solution Take 1 vial (3 mL total) by nebulization 4 (four) times a day 100 mL 0    levothyroxine 50 mcg tablet Take 1 tablet (50 mcg total) by mouth daily 30 tablet 3    meclizine (ANTIVERT) 25 mg tablet take 1 tablet by mouth every 6 hours if needed  IF DIZZINESS  0    midodrine (PROAMATINE) 10 MG tablet Take 10 mg by mouth 2 (two) times a day      omeprazole (PriLOSEC) 40 MG capsule take 1 capsule by mouth once daily  BEFORE BREAKFAST 30 capsule 5    predniSONE 20 mg tablet 3 pills daily x 3 days, 2 pills daily x 3 days, 1 pill daily x 3 days, 1/2 tablet daily for 4 days  Take with food 18 tablet 0    QUEtiapine (SEROquel) 300 mg tablet       traZODone (DESYREL) 50 mg tablet Take 50 mg by mouth daily at bedtime as needed for sleep      QUEtiapine (SEROquel) 50 mg tablet Take 50 mg by mouth daily at bedtime       No current facility-administered medications for this visit  Objective:    /60   Pulse 84   Temp 97 6 °F (36 4 °C)   Resp 16   Ht 5' 4" (1 626 m)   Wt 76 7 kg (169 lb)   LMP  (LMP Unknown)   SpO2 98%   BMI 29 01 kg/m²        Physical Exam   Constitutional: She appears well-developed and well-nourished  HENT:   Head: Normocephalic and atraumatic  Right Ear: Tympanic membrane, external ear and ear canal normal    Left Ear: Tympanic membrane, external ear and ear canal normal    Nose: No mucosal edema or rhinorrhea  Mouth/Throat: Uvula is midline, oropharynx is clear and moist and mucous membranes are normal    Eyes: Conjunctivae are normal    Neck: Neck supple  No edema present  No thyromegaly present     Cardiovascular: Normal rate, regular rhythm, normal heart sounds and intact distal pulses  No murmur heard  Pulmonary/Chest: Effort normal  She has wheezes (mild expiratory at the bases)  Abdominal: Bowel sounds are normal  She exhibits no distension  There is no splenomegaly or hepatomegaly  There is no tenderness  Lymphadenopathy:        Right cervical: No superficial cervical adenopathy present  Left cervical: No superficial cervical adenopathy present  Skin: Skin is warm, dry and intact  No rash noted  Psychiatric: She has a normal mood and affect  Nursing note and vitals reviewed               Kennedy Marley

## 2020-02-05 NOTE — PROGRESS NOTES
[facility]        NAME: Atul Hernandez is a 62 y o  female  : 1955    MRN: [id number]  DATE: 2020  TIME: 9:50 PM    Assessment and Plan   Syncope, unspecified syncope type [R55]  1  Syncope, unspecified syncope type  ECG 12 lead    Transfer to other facility   2  Shortness of breath  XR chest pa & lateral         Patient Instructions       Follow up with PCP in 3-5 days  Proceed to  ER if symptoms worsen  Chief Complaint     Chief Complaint   Patient presents with    Dizziness    Shortness of Breath     Pt states she feels short of breath after talking or any physical activity with tightness in chest    Loss of Consciousness     Pt states she passed out yesterday         History of Present Illness       Dizziness   This is a new problem  The current episode started yesterday  The problem occurs intermittently  The problem has been unchanged  Associated symptoms include chest pain and fatigue  Nothing aggravates the symptoms  She has tried nothing for the symptoms  The treatment provided no relief  Shortness of Breath   This is a new problem  The current episode started yesterday  The problem occurs intermittently  The problem has been unchanged  Associated symptoms include chest pain and syncope  Nothing aggravates the symptoms  She has tried nothing for the symptoms  The treatment provided no relief  Review of Systems   Review of Systems   Constitutional: Positive for fatigue  HENT: Negative  Eyes: Negative  Respiratory: Positive for shortness of breath  Cardiovascular: Positive for chest pain and syncope  Gastrointestinal: Negative  Neurological: Positive for dizziness           Current Medications       Current Outpatient Medications:     meclizine (ANTIVERT) 25 mg tablet, take 1 tablet by mouth every 6 hours if needed  IF DIZZINESS, Disp: , Rfl: 0    albuterol (PROVENTIL HFA,VENTOLIN HFA) 90 mcg/act inhaler, Inhale 2 puffs every 4 (four) hours as needed for wheezing, Disp: 1 Inhaler, Rfl: 0    ALPHAGAN P 0 1 %, 2 (two) times a day, Disp: , Rfl:     atorvastatin (LIPITOR) 20 mg tablet, Take 40 mg by mouth daily, Disp: , Rfl: 0    calcium carbonate-vitamin D (OSCAL-D) 500 mg-200 units per tablet, Take 1 tablet by mouth daily with breakfast, Disp: 30 tablet, Rfl: 0    DULoxetine (CYMBALTA) 60 mg delayed release capsule, Take 1 capsule (60 mg total) by mouth daily, Disp: 30 capsule, Rfl: 0    gabapentin (NEURONTIN) 300 mg capsule, Take 2 capsules (600 mg total) by mouth daily at bedtime, Disp: 60 capsule, Rfl: 0    ipratropium-albuterol (DUO-NEB) 0 5-2 5 mg/3 mL nebulizer solution, Take 1 vial (3 mL total) by nebulization 4 (four) times a day, Disp: 100 mL, Rfl: 0    levothyroxine 50 mcg tablet, Take 1 tablet (50 mcg total) by mouth daily, Disp: 30 tablet, Rfl: 3    midodrine (PROAMATINE) 10 MG tablet, Take 10 mg by mouth 2 (two) times a day, Disp: , Rfl:     omeprazole (PriLOSEC) 40 MG capsule, take 1 capsule by mouth once daily  BEFORE BREAKFAST, Disp: 30 capsule, Rfl: 5    predniSONE 20 mg tablet, 3 pills daily x 3 days, 2 pills daily x 3 days, 1 pill daily x 3 days, 1/2 tablet daily for 4 days   Take with food, Disp: 18 tablet, Rfl: 0    QUEtiapine (SEROquel) 50 mg tablet, Take 50 mg by mouth daily at bedtime, Disp: , Rfl:     traZODone (DESYREL) 50 mg tablet, Take 50 mg by mouth daily at bedtime as needed for sleep, Disp: , Rfl:     Current Allergies     Allergies as of 02/01/2020 - Reviewed 02/01/2020   Allergen Reaction Noted    Demerol [meperidine] Lightheadedness 01/11/2006    Sulfa antibiotics Hives 01/11/2006            The following portions of the patient's history were reviewed and updated as appropriate: allergies, current medications, past family history, past medical history, past social history, past surgical history and problem list      Past Medical History:   Diagnosis Date    Abdominal adhesions     Anesthesia complication difficult to wake up    Anxiety     Depression     Depression     Disease of thyroid gland     Fibromyalgia     GERD (gastroesophageal reflux disease)     Lazy eye     resolved: 3/27/17    Osteopenia     with joint pain-elevated DEV    Psychiatric disorder     Tinnitus     Wears glasses     for reading       Past Surgical History:   Procedure Laterality Date    ABDOMINAL SURGERY      lysis of adhesions x 2    APPENDECTOMY      CERVICAL FUSION      CHOLECYSTECTOMY      open    DILATION AND CURETTAGE OF UTERUS      NEUROMA EXCISION Right 5/26/2017    Procedure: EXCISION MASS / FIBROMA FOOT;  Surgeon: Mari Lino DPM;  Location: [facility];  Service:     RIGHT OOPHORECTOMY      WISDOM TOOTH EXTRACTION      x4       Family History   Problem Relation Age of Onset    Heart disease Mother     Stroke Mother 58    COPD Mother     Arthritis Mother     Hypertension Father     Kidney disease Brother         kidney transplant    No Known Problems Sister     No Known Problems Maternal Aunt     No Known Problems Maternal Uncle     No Known Problems Paternal Aunt     No Known Problems Paternal Uncle     No Known Problems Maternal Grandmother     No Known Problems Maternal Grandfather     No Known Problems Paternal Grandmother     No Known Problems Paternal Grandfather     ADD / ADHD Neg Hx     Anesthesia problems Neg Hx     Cancer Neg Hx     Clotting disorder Neg Hx     Collagen disease Neg Hx     Diabetes Neg Hx     Dislocations Neg Hx     Learning disabilities Neg Hx     Neurological problems Neg Hx     Osteoporosis Neg Hx     Rheumatologic disease Neg Hx     Scoliosis Neg Hx     Vascular Disease Neg Hx          Medications have been verified          Objective   /86   Pulse 79   Temp 98 8 °F (37 1 °C)   Ht 5' 4" (1 626 m)   Wt 78 7 kg (173 lb 6 4 oz)   LMP  (LMP Unknown)   SpO2 95%   BMI 29 76 kg/m²        Physical Exam     Physical Exam   Constitutional: She appears well-developed  She appears ill  HENT:   Head: Normocephalic  Neck: Normal range of motion  Neck supple  Cardiovascular: Normal rate and regular rhythm  Pulmonary/Chest: Effort normal and breath sounds normal    Abdominal: Soft

## 2020-02-07 LAB
CHOLEST SERPL-MCNC: 170 MG/DL (ref 100–199)
CHOLEST/HDLC SERPL: 3.1 RATIO (ref 0–4.4)
EST. AVERAGE GLUCOSE BLD GHB EST-MCNC: 128 MG/DL
HBA1C MFR BLD: 6.1 % (ref 4.8–5.6)
HCV AB S/CO SERPL IA: <0.1 S/CO RATIO (ref 0–0.9)
HDLC SERPL-MCNC: 55 MG/DL
LDLC SERPL CALC-MCNC: 92 MG/DL (ref 0–99)
MICRODELETION SYND BLD/T FISH: NORMAL
SL AMB VLDL CHOLESTEROL CALC: 23 MG/DL (ref 5–40)
TRIGL SERPL-MCNC: 114 MG/DL (ref 0–149)

## 2020-02-13 ENCOUNTER — TELEPHONE (OUTPATIENT)
Dept: NEUROLOGY | Facility: CLINIC | Age: 65
End: 2020-02-13

## 2020-02-13 NOTE — TELEPHONE ENCOUNTER
Pt called c/o dizziness started a month ago, but now it's every night, worse in the evening and worse when she changed position  from sitting to standing  Pt states that she was diagnosed w/ orthostatic hypotension last year  Pt takes midodrine 10 mg 1 tab bid (no parameters)  Pt states that her pcp was made aware  Appt w/ cardiologist scheduled 3/9/20  Also, c/o hand constant numbness and tingling worse right hand started 1 week ago  No swelling and denies pain  Pt has f/u appt scheduled in April  Requesting sooner appt  Spoke w/ Ana and advised of the above  Per Ana ok to schedule pt on 2/19/20 at  1:30  Appt scheduled

## 2020-02-25 ENCOUNTER — OFFICE VISIT (OUTPATIENT)
Dept: NEUROLOGY | Facility: CLINIC | Age: 65
End: 2020-02-25
Payer: COMMERCIAL

## 2020-02-25 VITALS
SYSTOLIC BLOOD PRESSURE: 112 MMHG | HEART RATE: 78 BPM | HEIGHT: 64 IN | BODY MASS INDEX: 28.85 KG/M2 | DIASTOLIC BLOOD PRESSURE: 84 MMHG | WEIGHT: 169 LBS

## 2020-02-25 DIAGNOSIS — G43.009 MIGRAINE WITHOUT AURA AND WITHOUT STATUS MIGRAINOSUS, NOT INTRACTABLE: ICD-10-CM

## 2020-02-25 DIAGNOSIS — R53.1 WEAKNESS: ICD-10-CM

## 2020-02-25 DIAGNOSIS — R42 DIZZINESS: Primary | ICD-10-CM

## 2020-02-25 DIAGNOSIS — R42 VERTIGO: ICD-10-CM

## 2020-02-25 DIAGNOSIS — R26.2 DIFFICULTY WALKING: ICD-10-CM

## 2020-02-25 PROCEDURE — 3074F SYST BP LT 130 MM HG: CPT | Performed by: NURSE PRACTITIONER

## 2020-02-25 PROCEDURE — 3008F BODY MASS INDEX DOCD: CPT | Performed by: NURSE PRACTITIONER

## 2020-02-25 PROCEDURE — 1036F TOBACCO NON-USER: CPT | Performed by: NURSE PRACTITIONER

## 2020-02-25 PROCEDURE — 99214 OFFICE O/P EST MOD 30 MIN: CPT | Performed by: NURSE PRACTITIONER

## 2020-02-25 PROCEDURE — 3079F DIAST BP 80-89 MM HG: CPT | Performed by: NURSE PRACTITIONER

## 2020-02-25 NOTE — PROGRESS NOTES
Patient ID: Castillo Irby is a 62 y o  female  Assessment/Plan:     Diagnoses and all orders for this visit:    Dizziness  Comments:  F/up with [facility] for therapy re: Dizziness and Gait Dysfunctions  Maintain hydration  Use compression stockings daily  Meclizine 25 mg Q 6 hours prn    Weakness  -     Ambulatory referral to Physical Therapy; Future    Difficulty walking  -     Ambulatory referral to Physical Therapy; Future    Migraine without aura and without status migrainosus, not intractable  Comments:  Migraine diary, track frequency, duration, intensity and triggers  Continue Motrin no more than  3 times a week  Try ice therapy during migraine  F/up in 2 mos    Vertigo  Comments:  Meclizine 25 mg q 6 hours p r n  Discussed the above with patient  Would like to receive referral for [facility] due to dizziness and lower extremity weakness  Feels would be a better option than physical therapy given her previous events  Will start utilizing meclizine 25 mg with dizzy spells that include vertigo symptomatology like room spinning  Would like to track her headaches continue on Motrin/Aleve for abortive therapies and try ice at this time before adding any further medication to medication regimen  Will continue to hydrate and wear compression stockings  Will follow up in 2 months to review her headache diary and consider alternate therapies    Subjective:    HPI   Castillo Irby is a 69-year-old female with medical comorbidities consisting of anxiety/depression, fibromyalgia, hypothyroidism and significant psychiatric disease  Patient here for follow-up of headaches muscle weakness with spasticity in dizziness  Patient reports she is feeling much better with regards to her mobility and has had much success with recent psychiatric treatments, feels satisfied with her current psychiatric regimen and noted she felt her psychiatric issues were causing most of her symptoms    Patient able to reverse calmly a focus able to discussed at length her current issues with regards to her dizziness and headaches  Reports dizziness that occurs approximately 3-4 days a week mostly at night, very rarely during the day that she began to feel dizzy  Stated that her dizziness becomes more of lightheadedness with occasional room spinning and at sometimes she has to lower herself to the floor so she does not fall  Patient does have meclizine to take however has not taken it because she is not sure that the episodes will last long enough for the medication to take effect and if she needs to take after her dizzy spell wears off  Patient reports these spells last about 10-15 minutes during which time she sits and rests and then begins her activities again slowly  Patient denies use of any medication to assist   Patient does have a history of orthostatic hypotension but sometimes these dizzy spells become more like vertigo  Discussed with patient the use of meclizine in the event of room spinning events, advise can take 25 mg every 6 hours as needed encouraged her to use meclizine during times of vertigo  We also discussed the use of her compression stockings which patient states she does not wear, counseled over the purpose of stockings in orthostatic hypotension  We discussed adequate hydration, states she drinks 2 large bottles of water daily, denies many caffeinated beverages  Patient continues with lower extremity weakness along with dizziness, was scheduled for physical therapy however she stopped when she became SOB and dizzy and ended up going to the ER  D/W her returning to therapy  She did state that she is going to follow up with Rheumatology and they may want her to return to PT for her joints but she is unsure  Discussed the possibility of going to [address] [place] [address] given her imbalances with walking,dizziness and standing  Pt feels this is a good option, will give referral for this       Reports headaches are temporal bilaterally but not at the same time and tender to the touch  Reports sharp pain with pulsation that lasts about a minute and then gets a residual headache that is behind the eye with some nausea  Denies photo/phonophobia  When headache is in the right side she notices her R eye lid fluttering when the h/a is on the right side, this does not happen on the left side  Patient has not indicated recognizing any specific triggers, however knows most of her headaches are in the afternoon on her way home from work  She is not determined if the days that she has a headache correlate with any increased stressful or be dizziness at work  Would like to track her headaches to determine if there is a pattern  Discussed patient the use of possible CGRP slight medications such as Aimovig, however patient indicated she does not on add any more medications to her list of medications at this time as she feels pretty good right now  Would prefer to track her headaches continue with the use of Motrin or Aleve and possibly add ice when needed  Would like to address this when she follows up with Neurology going forward  She does have a headache at this time  Description of Headaches:  Location of pain: right-sided unilateral, temporal, behind the rt eye-however on occasion does have left-sided temporal headache  Radiation of pain?:temple to the rt eye  Character of pain:aching, pulsating and sharp  Severity of pain:4  Accompanying symptoms:nausea, denies Photo/Phonopobia or vision changes with the headache  Prodromal sx?: none, just comes on as a sharp pain  Rapidity of onset: sudden  Typical duration of individual headache: 2hours and mostly mid afternoon  Frequency of [id number] a week  Are you ever headache free?yes - sometimes has more headache days than not  Are most headaches similar in presentation? yes  Exacerbating factors:nothing identified  Typical precipitants: work: usual after work    Has not thought about the correlation between work stress and headaches  Temporal Pattern of Headaches:  Started having HA's  at 16 yo started with migraines however they had subsided for a little while but returned approximately 4 weeks ago  Worst time of day: afternoon  Awaken from sleep?: no  Seasonal pattern?: no  'Clustering' of HA's over time? no  Overall pattern since problem began: unchanged    Degree of Functional Impairment: mild, localized and feels like her eye feels funny with a little blurry vision with some floaters which she has had in the past    Current Use of Meds to Treat HA:  Abortive meds? NSAIDs (Motrin/Aleve)  Daily use? no  Preventive meds? Has no preventative meds right now  Non-Medical/Alternative treatments for HA: no but discussed the poss  Use of ice     Additional Relevant History:  History of head/neck trauma? no  History of head/neck surgery? yes - Cervical fusion on 2 level, C3-5  Family h/o headache problems? yes - maternal grandmother  Family history of aneurysms? no  Use of meds that might worsen HA's- no  Exposure to carbon monoxide? no  Substance use: No    Prior Evaluation/Treatments:  Have you seen another physician for your headaches? yes - is on Elavil, unable to take other meds that affect BP   Prior work-up: CT head  Trigger point injections? no  Botox injections? no  Epidural injections? no  Prior PREVENTATIVE medications: antidepressants (Elavil), Is unable to use verapamil/propanolol due to orthostatic hypotension  Prior ABORTIVE mediations: NSAIDs (Motrin/Aleve3)    The following portions of the patient's history were reviewed and updated as appropriate: allergies, current medications, past family history, past medical history, past social history, past surgical history and problem list        Objective:    Blood pressure 112/84, pulse 78, height 5' 4" (1 626 m), weight 76 7 kg (169 lb)  Physical Exam   Constitutional: She is oriented to person, place, and time   She appears well-developed and well-nourished  HENT:   Head: Normocephalic  Eyes: Pupils are equal, round, and reactive to light  Lids are normal    Neck: Normal range of motion  Cardiovascular: Normal rate and regular rhythm  Pulmonary/Chest: Effort normal and breath sounds normal    Abdominal: Soft  Musculoskeletal: Normal range of motion  Neurological: She is alert and oriented to person, place, and time  She has normal reflexes  Skin: Skin is dry  Psychiatric: She has a normal mood and affect  Her speech is normal        Neurological Exam  Mental Status  Alert  Oriented to person, place, time and situation  Recalls 3 of 3 objects immediately  Speech is normal  Able to name objects and repeat  Follows three-step commands  Attention and concentration are normal     Cranial Nerves  CN II: Visual acuity is normal  Visual fields full to confrontation  CN III, IV, VI: Extraocular movements intact bilaterally  Normal lids and orbits bilaterally  Pupils equal round and reactive to light bilaterally  CN V: Facial sensation is normal   CN VII: Full and symmetric facial movement  CN VIII: Hearing is normal   CN IX, X: Palate elevates symmetrically  Normal gag reflex  CN XI: Shoulder shrug strength is normal   CN XII: Tongue midline without atrophy or fasciculations  Motor  Normal muscle bulk throughout  Normal muscle tone  Strength is 5/5 in all four extremities except as noted  Right                     Left   Iliopsoas                               4+                          4+   Quadriceps                           4+                          4+   Hamstring                             4+                          4+    Sensory  Light touch is normal in upper and lower extremities  Temperature is normal in upper and lower extremities  Vibration is normal in upper and lower extremities       Reflexes  Deep tendon reflexes are 2+ and symmetric in all four extremities with downgoing toes bilaterally  ROS:    Review of Systems   Constitutional: Negative  HENT: Negative  Eyes: Negative  Respiratory: Positive for wheezing  Cardiovascular: Negative  Gastrointestinal: Negative  Endocrine: Positive for cold intolerance  Genitourinary: Negative  Musculoskeletal: Positive for neck pain  Skin: Positive for wound (L lower ankle)  Allergic/Immunologic: Negative  Neurological: Positive for dizziness, light-headedness and headaches  Hematological: Negative  Psychiatric/Behavioral: The patient is nervous/anxious

## 2020-02-25 NOTE — PATIENT INSTRUCTIONS
Follow up with [facility] for therapies/balance  Track your headaches in frequency, severity, what doing when they come on or before they come on  Meclizine 25 mg Q 6 hours p r n   For vertigo/dizziness  I saw therapy for headaches  Motrin/Aleve for abortive therapy for headache  Maintain adequate hydration  Use compression stockings  Follow up in 2 months

## 2020-03-02 ENCOUNTER — APPOINTMENT (EMERGENCY)
Dept: RADIOLOGY | Facility: HOSPITAL | Age: 65
End: 2020-03-02
Payer: COMMERCIAL

## 2020-03-02 ENCOUNTER — TELEPHONE (OUTPATIENT)
Dept: FAMILY MEDICINE CLINIC | Facility: CLINIC | Age: 65
End: 2020-03-02

## 2020-03-02 ENCOUNTER — HOSPITAL ENCOUNTER (OUTPATIENT)
Facility: HOSPITAL | Age: 65
Setting detail: OBSERVATION
Discharge: HOME | End: 2020-03-04
Attending: EMERGENCY MEDICINE | Admitting: FAMILY MEDICINE
Payer: COMMERCIAL

## 2020-03-02 DIAGNOSIS — R55 SYNCOPE: Primary | ICD-10-CM

## 2020-03-02 DIAGNOSIS — I95.1 ORTHOSTATIC HYPOTENSION: ICD-10-CM

## 2020-03-02 DIAGNOSIS — N39.41 URGE INCONTINENCE OF URINE: ICD-10-CM

## 2020-03-02 PROBLEM — Z87.898 HISTORY OF VERTIGO: Status: ACTIVE | Noted: 2020-03-02

## 2020-03-02 PROBLEM — E03.9 HYPOTHYROIDISM: Status: ACTIVE | Noted: 2019-09-25

## 2020-03-02 LAB
ALBUMIN SERPL BCP-MCNC: 3.8 G/DL (ref 3.5–5)
ALP SERPL-CCNC: 77 U/L (ref 46–116)
ALT SERPL W P-5'-P-CCNC: 31 U/L (ref 12–78)
ANION GAP SERPL CALCULATED.3IONS-SCNC: 3 MMOL/L (ref 4–13)
APTT PPP: 25 SECONDS (ref 23–37)
AST SERPL W P-5'-P-CCNC: 20 U/L (ref 5–45)
BASOPHILS # BLD AUTO: 0.02 THOUSANDS/ΜL (ref 0–0.1)
BASOPHILS NFR BLD AUTO: 0 % (ref 0–1)
BILIRUB SERPL-MCNC: 0.4 MG/DL (ref 0.2–1)
BUN SERPL-MCNC: 21 MG/DL (ref 5–25)
CALCIUM SERPL-MCNC: 9.3 MG/DL (ref 8.3–10.1)
CHLORIDE SERPL-SCNC: 103 MMOL/L (ref 100–108)
CO2 SERPL-SCNC: 32 MMOL/L (ref 21–32)
CREAT SERPL-MCNC: 0.91 MG/DL (ref 0.6–1.3)
EOSINOPHIL # BLD AUTO: 0.12 THOUSAND/ΜL (ref 0–0.61)
EOSINOPHIL NFR BLD AUTO: 2 % (ref 0–6)
ERYTHROCYTE [DISTWIDTH] IN BLOOD BY AUTOMATED COUNT: 13.8 % (ref 11.6–15.1)
GFR SERPL CREATININE-BSD FRML MDRD: 67 ML/MIN/1.73SQ M
GLUCOSE SERPL-MCNC: 95 MG/DL (ref 65–140)
GLUCOSE SERPL-MCNC: 99 MG/DL (ref 65–140)
HCT VFR BLD AUTO: 40 % (ref 34.8–46.1)
HGB BLD-MCNC: 12.7 G/DL (ref 11.5–15.4)
IMM GRANULOCYTES # BLD AUTO: 0.02 THOUSAND/UL (ref 0–0.2)
IMM GRANULOCYTES NFR BLD AUTO: 0 % (ref 0–2)
INR PPP: 0.98 (ref 0.91–1.09)
LYMPHOCYTES # BLD AUTO: 1.04 THOUSANDS/ΜL (ref 0.6–4.47)
LYMPHOCYTES NFR BLD AUTO: 18 % (ref 14–44)
MCH RBC QN AUTO: 27.7 PG (ref 26.8–34.3)
MCHC RBC AUTO-ENTMCNC: 31.8 G/DL (ref 31.4–37.4)
MCV RBC AUTO: 87 FL (ref 82–98)
MONOCYTES # BLD AUTO: 0.51 THOUSAND/ΜL (ref 0.17–1.22)
MONOCYTES NFR BLD AUTO: 9 % (ref 4–12)
NEUTROPHILS # BLD AUTO: 4.02 THOUSANDS/ΜL (ref 1.85–7.62)
NEUTS SEG NFR BLD AUTO: 71 % (ref 43–75)
NRBC BLD AUTO-RTO: 0 /100 WBCS
PLATELET # BLD AUTO: 224 THOUSANDS/UL (ref 149–390)
PMV BLD AUTO: 10.4 FL (ref 8.9–12.7)
POTASSIUM SERPL-SCNC: 4.6 MMOL/L (ref 3.5–5.3)
PROT SERPL-MCNC: 7.1 G/DL (ref 6.4–8.2)
PROTHROMBIN TIME: 10.6 SECONDS (ref 9.8–12)
RBC # BLD AUTO: 4.58 MILLION/UL (ref 3.81–5.12)
SODIUM SERPL-SCNC: 138 MMOL/L (ref 136–145)
TROPONIN I SERPL-MCNC: <0.02 NG/ML
WBC # BLD AUTO: 5.73 THOUSAND/UL (ref 4.31–10.16)

## 2020-03-02 PROCEDURE — 96361 HYDRATE IV INFUSION ADD-ON: CPT

## 2020-03-02 PROCEDURE — 73502 X-RAY EXAM HIP UNI 2-3 VIEWS: CPT

## 2020-03-02 PROCEDURE — 73130 X-RAY EXAM OF HAND: CPT

## 2020-03-02 PROCEDURE — 80053 COMPREHEN METABOLIC PANEL: CPT | Performed by: EMERGENCY MEDICINE

## 2020-03-02 PROCEDURE — 70450 CT HEAD/BRAIN W/O DYE: CPT

## 2020-03-02 PROCEDURE — 93005 ELECTROCARDIOGRAM TRACING: CPT

## 2020-03-02 PROCEDURE — 99223 1ST HOSP IP/OBS HIGH 75: CPT | Performed by: INTERNAL MEDICINE

## 2020-03-02 PROCEDURE — 72125 CT NECK SPINE W/O DYE: CPT

## 2020-03-02 PROCEDURE — 96374 THER/PROPH/DIAG INJ IV PUSH: CPT

## 2020-03-02 PROCEDURE — 36415 COLL VENOUS BLD VENIPUNCTURE: CPT | Performed by: EMERGENCY MEDICINE

## 2020-03-02 PROCEDURE — 94760 N-INVAS EAR/PLS OXIMETRY 1: CPT

## 2020-03-02 PROCEDURE — 84484 ASSAY OF TROPONIN QUANT: CPT | Performed by: INTERNAL MEDICINE

## 2020-03-02 PROCEDURE — 82948 REAGENT STRIP/BLOOD GLUCOSE: CPT

## 2020-03-02 PROCEDURE — 84484 ASSAY OF TROPONIN QUANT: CPT | Performed by: EMERGENCY MEDICINE

## 2020-03-02 PROCEDURE — 85610 PROTHROMBIN TIME: CPT | Performed by: EMERGENCY MEDICINE

## 2020-03-02 PROCEDURE — 99285 EMERGENCY DEPT VISIT HI MDM: CPT

## 2020-03-02 PROCEDURE — 99285 EMERGENCY DEPT VISIT HI MDM: CPT | Performed by: EMERGENCY MEDICINE

## 2020-03-02 PROCEDURE — 85730 THROMBOPLASTIN TIME PARTIAL: CPT | Performed by: EMERGENCY MEDICINE

## 2020-03-02 PROCEDURE — 85025 COMPLETE CBC W/AUTO DIFF WBC: CPT | Performed by: EMERGENCY MEDICINE

## 2020-03-02 PROCEDURE — 71045 X-RAY EXAM CHEST 1 VIEW: CPT

## 2020-03-02 PROCEDURE — 94640 AIRWAY INHALATION TREATMENT: CPT

## 2020-03-02 RX ORDER — SODIUM CHLORIDE 9 MG/ML
100 INJECTION, SOLUTION INTRAVENOUS CONTINUOUS
Status: DISCONTINUED | OUTPATIENT
Start: 2020-03-02 | End: 2020-03-04 | Stop reason: HOSPADM

## 2020-03-02 RX ORDER — TRAZODONE HYDROCHLORIDE 50 MG/1
50 TABLET ORAL
Status: DISCONTINUED | OUTPATIENT
Start: 2020-03-02 | End: 2020-03-02

## 2020-03-02 RX ORDER — ATORVASTATIN CALCIUM 20 MG/1
20 TABLET, FILM COATED ORAL
Status: DISCONTINUED | OUTPATIENT
Start: 2020-03-02 | End: 2020-03-04 | Stop reason: HOSPADM

## 2020-03-02 RX ORDER — MECLIZINE HYDROCHLORIDE 25 MG/1
25 TABLET ORAL EVERY 8 HOURS PRN
Status: DISCONTINUED | OUTPATIENT
Start: 2020-03-02 | End: 2020-03-04 | Stop reason: HOSPADM

## 2020-03-02 RX ORDER — QUETIAPINE FUMARATE 100 MG/1
300 TABLET, FILM COATED ORAL
Status: DISCONTINUED | OUTPATIENT
Start: 2020-03-02 | End: 2020-03-04 | Stop reason: HOSPADM

## 2020-03-02 RX ORDER — BRIMONIDINE TARTRATE 0.15 %
1 DROPS OPHTHALMIC (EYE) 2 TIMES DAILY
Status: DISCONTINUED | OUTPATIENT
Start: 2020-03-02 | End: 2020-03-04 | Stop reason: HOSPADM

## 2020-03-02 RX ORDER — QUETIAPINE FUMARATE 25 MG/1
50 TABLET, FILM COATED ORAL
Status: DISCONTINUED | OUTPATIENT
Start: 2020-03-02 | End: 2020-03-02

## 2020-03-02 RX ORDER — KETOROLAC TROMETHAMINE 30 MG/ML
15 INJECTION, SOLUTION INTRAMUSCULAR; INTRAVENOUS EVERY 6 HOURS PRN
Status: DISCONTINUED | OUTPATIENT
Start: 2020-03-02 | End: 2020-03-04 | Stop reason: HOSPADM

## 2020-03-02 RX ORDER — ACETAMINOPHEN 325 MG/1
650 TABLET ORAL EVERY 6 HOURS PRN
Status: DISCONTINUED | OUTPATIENT
Start: 2020-03-02 | End: 2020-03-04 | Stop reason: HOSPADM

## 2020-03-02 RX ORDER — MORPHINE SULFATE 4 MG/ML
4 INJECTION, SOLUTION INTRAMUSCULAR; INTRAVENOUS ONCE
Status: COMPLETED | OUTPATIENT
Start: 2020-03-02 | End: 2020-03-02

## 2020-03-02 RX ORDER — GABAPENTIN 300 MG/1
600 CAPSULE ORAL
Status: DISCONTINUED | OUTPATIENT
Start: 2020-03-02 | End: 2020-03-04 | Stop reason: HOSPADM

## 2020-03-02 RX ORDER — DULOXETIN HYDROCHLORIDE 60 MG/1
60 CAPSULE, DELAYED RELEASE ORAL 2 TIMES DAILY
Status: DISCONTINUED | OUTPATIENT
Start: 2020-03-02 | End: 2020-03-04 | Stop reason: HOSPADM

## 2020-03-02 RX ORDER — B-COMPLEX WITH VITAMIN C
1 TABLET ORAL
Status: DISCONTINUED | OUTPATIENT
Start: 2020-03-03 | End: 2020-03-04 | Stop reason: HOSPADM

## 2020-03-02 RX ORDER — IPRATROPIUM BROMIDE AND ALBUTEROL SULFATE 2.5; .5 MG/3ML; MG/3ML
3 SOLUTION RESPIRATORY (INHALATION)
Status: DISCONTINUED | OUTPATIENT
Start: 2020-03-02 | End: 2020-03-04 | Stop reason: HOSPADM

## 2020-03-02 RX ORDER — PANTOPRAZOLE SODIUM 40 MG/1
40 TABLET, DELAYED RELEASE ORAL
Status: DISCONTINUED | OUTPATIENT
Start: 2020-03-03 | End: 2020-03-04 | Stop reason: HOSPADM

## 2020-03-02 RX ORDER — MIDODRINE HYDROCHLORIDE 5 MG/1
10 TABLET ORAL
Status: DISCONTINUED | OUTPATIENT
Start: 2020-03-03 | End: 2020-03-04 | Stop reason: HOSPADM

## 2020-03-02 RX ORDER — LEVOTHYROXINE SODIUM 0.05 MG/1
50 TABLET ORAL
Status: DISCONTINUED | OUTPATIENT
Start: 2020-03-03 | End: 2020-03-04 | Stop reason: HOSPADM

## 2020-03-02 RX ORDER — MIDODRINE HYDROCHLORIDE 5 MG/1
10 TABLET ORAL 2 TIMES DAILY
Status: DISCONTINUED | OUTPATIENT
Start: 2020-03-02 | End: 2020-03-02

## 2020-03-02 RX ADMIN — IPRATROPIUM BROMIDE AND ALBUTEROL SULFATE 3 ML: 2.5; .5 SOLUTION RESPIRATORY (INHALATION) at 21:57

## 2020-03-02 RX ADMIN — QUETIAPINE FUMARATE 300 MG: 100 TABLET ORAL at 21:19

## 2020-03-02 RX ADMIN — SODIUM CHLORIDE 100 ML/HR: 0.9 INJECTION, SOLUTION INTRAVENOUS at 17:56

## 2020-03-02 RX ADMIN — MORPHINE SULFATE 4 MG: 4 INJECTION INTRAVENOUS at 14:12

## 2020-03-02 RX ADMIN — MIDODRINE HYDROCHLORIDE 10 MG: 5 TABLET ORAL at 17:57

## 2020-03-02 RX ADMIN — DULOXETINE HYDROCHLORIDE 60 MG: 60 CAPSULE, DELAYED RELEASE ORAL at 21:19

## 2020-03-02 RX ADMIN — SODIUM CHLORIDE 1000 ML: 0.9 INJECTION, SOLUTION INTRAVENOUS at 14:12

## 2020-03-02 RX ADMIN — GABAPENTIN 600 MG: 300 CAPSULE ORAL at 21:19

## 2020-03-02 NOTE — TELEPHONE ENCOUNTER
Patient stated that she passed out and caught her hand in the door and its bruised and scratched up    Patient was seen by Neurology for this problem  Patient was advised to go to the ER per Dr Chu Daly   Patient agreed to go to the ER  Ildefonso Hodge MA  No further action

## 2020-03-02 NOTE — TELEPHONE ENCOUNTER
Paxton Mcduffie passed out last night and got her hand stuck in the door    Triage to Rhianna Taylor

## 2020-03-02 NOTE — RESPIRATORY THERAPY NOTE
RT Protocol Note  Castillo Irby 62 y o  female MRN: [id number]  Unit/Bed#: [address] [place], [place] [id number] Encounter: [id number]    Assessment    Principal Problem:    Syncope      Home Pulmonary Medications   duoneb   Home Devices/Therapy: Other (Comment)(neb)    Past Medical History:   Diagnosis Date    Abdominal adhesions     Anesthesia complication     difficult to wake up    Anxiety     Depression     Depression     Disease of thyroid gland     Fibromyalgia     GERD (gastroesophageal reflux disease)     Lazy eye     resolved: 3/27/17    Osteopenia     with joint pain-elevated DEV    Psychiatric disorder     Tinnitus     Wears glasses     for reading     Social History     Socioeconomic History    Marital status: /Civil Union     Spouse name: None    Number of children: None    Years of education: None    Highest education level: None   Occupational History    None   Social Needs    Financial resource strain: None    Food insecurity:     Worry: None     Inability: None    Transportation needs:     Medical: None     Non-medical: None   Tobacco Use    Smoking status: Never Smoker    Smokeless tobacco: Never Used   Substance and Sexual Activity    Alcohol use: Not Currently     Frequency: Never     Binge frequency: Never    Drug use: Never    Sexual activity: Not Currently   Lifestyle    Physical activity:     Days per week: None     Minutes per session: None    Stress: None   Relationships    Social connections:     Talks on phone: None     Gets together: None     Attends Bahai service: None     Active member of club or organization: None     Attends meetings of clubs or organizations: None     Relationship status: None    Intimate partner violence:     Fear of current or ex partner: None     Emotionally abused: None     Physically abused: None     Forced sexual activity: None   Other Topics Concern    None   Social History Narrative    None       Subjective         Objective    Physical Exam:   Assessment Type: Assess only  General Appearance: Alert, Awake  Respiratory Pattern: Normal  Chest Assessment: Chest expansion symmetrical  Bilateral Breath Sounds: Diminished  Cough: Dry, Non-productive  O2 Device: Ra    Vitals:  Blood pressure 105/63, pulse 99, temperature 97 9 °F (36 6 °C), temperature source Oral, resp  rate 20, height 5' 4" (1 626 m), weight 75 8 kg (167 lb), SpO2 98 %            Imaging and other studies:     O2 Device: Ra     Plan    Respiratory Plan: Mild Distress pathway        Resp Comments: Pt in no distress

## 2020-03-02 NOTE — ED NOTES
Patient is resting comfortably  Pt updated on plan of care  Questions reviewed  Verbalized understanding       Shadi Hoffmann RN  03/02/20 0929

## 2020-03-02 NOTE — ED PROVIDER NOTES
History  Chief Complaint   Patient presents with    Syncope     reports dizziness and syncope over past month   worsening in past 2 wkes  3/1 @ 2330, pt ambulated and became dizzy passing out and falling backward hitting her head and hurting L hand on door     Hx SYNCOPE  STOOD UP FAST LAST NIGHT AND HAD SYNCOPE, FELL AND HIT HER HEAD AND HIT HER LT HAND TO THE DOOR    PT IS POSITIVE ORTHOSTATIC IN THE ER    C/O HCHE, LT HAND PAIN, NECK PAIN    LT HAD ECCHYMOSIS AND SWELLING      History provided by:  Patient  Syncope   Episode history:  Single  Most recent episode:  Yesterday  Progression:  Resolved  Chronicity:  Recurrent  Context: standing up    Witnessed: yes    Relieved by:  None tried  Worsened by:  Nothing  Ineffective treatments:  None tried  Associated symptoms: headaches        Prior to Admission Medications   Prescriptions Last Dose Informant Patient Reported? Taking?    ALPHAGAN P 0 1 %   Yes No   Si (two) times a day   DULoxetine (CYMBALTA) 60 mg delayed release capsule   No No   Sig: Take 1 capsule (60 mg total) by mouth daily   Patient taking differently: Take 60 mg by mouth 2 (two) times a day    QUEtiapine (SEROquel) 300 mg tablet   Yes No   Sig: Take 300 mg by mouth daily at bedtime    QUEtiapine (SEROquel) 50 mg tablet   Yes No   Sig: Take 50 mg by mouth daily at bedtime   albuterol (PROVENTIL HFA,VENTOLIN HFA) 90 mcg/act inhaler   No No   Sig: Inhale 2 puffs every 4 (four) hours as needed for wheezing   atorvastatin (LIPITOR) 20 mg tablet   Yes No   Sig: Take 20 mg by mouth daily    calcium carbonate-vitamin D (OSCAL-D) 500 mg-200 units per tablet   No No   Sig: Take 1 tablet by mouth daily with breakfast   gabapentin (NEURONTIN) 300 mg capsule   No No   Sig: Take 2 capsules (600 mg total) by mouth daily at bedtime   ipratropium-albuterol (DUO-NEB) 0 5-2 5 mg/3 mL nebulizer solution   No No   Sig: Take 1 vial (3 mL total) by nebulization 4 (four) times a day   levothyroxine 50 mcg tablet No No   Sig: Take 1 tablet (50 mcg total) by mouth daily   meclizine (ANTIVERT) 25 mg tablet   Yes No   Sig: take 1 tablet by mouth every 6 hours if needed  IF DIZZINESS   midodrine (PROAMATINE) 10 MG tablet  Self Yes No   Sig: Take 10 mg by mouth 2 (two) times a day   omeprazole (PriLOSEC) 40 MG capsule   No No   Sig: take 1 capsule by mouth once daily  BEFORE BREAKFAST   traZODone (DESYREL) 50 mg tablet  Self Yes No   Sig: Take 50 mg by mouth daily at bedtime as needed for sleep      Facility-Administered Medications: None       Past Medical History:   Diagnosis Date    Abdominal adhesions     Anesthesia complication     difficult to wake up    Anxiety     Depression     Depression     Disease of thyroid gland     Fibromyalgia     GERD (gastroesophageal reflux disease)     Lazy eye     resolved: 3/27/17    Osteopenia     with joint pain-elevated DEV    Psychiatric disorder     Tinnitus     Wears glasses     for reading       Past Surgical History:   Procedure Laterality Date    ABDOMINAL SURGERY      lysis of adhesions x 2    APPENDECTOMY      CERVICAL FUSION      CHOLECYSTECTOMY      open    DILATION AND CURETTAGE OF UTERUS      NEUROMA EXCISION Right 5/26/2017    Procedure: EXCISION MASS / FIBROMA FOOT;  Surgeon: Cara Bhatti DPM;  Location: [facility];  Service:     RIGHT OOPHORECTOMY      WISDOM TOOTH EXTRACTION      x4       Family History   Problem Relation Age of Onset    Heart disease Mother     Stroke Mother 58    COPD Mother     Arthritis Mother     Hypertension Father     Kidney disease Brother         kidney transplant    No Known Problems Sister     No Known Problems Maternal Aunt     No Known Problems Maternal Uncle     No Known Problems Paternal Aunt     No Known Problems Paternal Uncle     No Known Problems Maternal Grandmother     No Known Problems Maternal Grandfather     No Known Problems Paternal Grandmother     No Known Problems Paternal Jerzy Rumps ADD / ADHD Neg Hx     Anesthesia problems Neg Hx     Cancer Neg Hx     Clotting disorder Neg Hx     Collagen disease Neg Hx     Diabetes Neg Hx     Dislocations Neg Hx     Learning disabilities Neg Hx     Neurological problems Neg Hx     Osteoporosis Neg Hx     Rheumatologic disease Neg Hx     Scoliosis Neg Hx     Vascular Disease Neg Hx      I have reviewed and agree with the history as documented  E-Cigarette/Vaping    E-Cigarette Use Never User      E-Cigarette/Vaping Substances    Nicotine No     THC No     CBD No     Flavoring No     Other No     Unknown No      Social History     Tobacco Use    Smoking status: Never Smoker    Smokeless tobacco: Never Used   Substance Use Topics    Alcohol use: Not Currently    Drug use: No       Review of Systems   Cardiovascular: Positive for syncope  Musculoskeletal: Positive for arthralgias  LT HAND PAIN   Neurological: Positive for headaches  All other systems reviewed and are negative  Physical Exam  Physical Exam   Constitutional: She is oriented to person, place, and time  She appears well-developed and well-nourished  No distress  HENT:   Head: Normocephalic and atraumatic  Eyes: Pupils are equal, round, and reactive to light  Conjunctivae and EOM are normal    Neck: Normal range of motion  Neck supple  MILD PARASPINAL TENDERNESS   Cardiovascular: Normal rate and regular rhythm  Pulmonary/Chest: Effort normal and breath sounds normal    Abdominal: Soft  She exhibits no distension  There is no tenderness  Musculoskeletal: She exhibits tenderness  LT HAND DORSAL ECCHYMOSIS   Neurological: She is alert and oriented to person, place, and time  Skin: Skin is warm and dry  She is not diaphoretic  Nursing note and vitals reviewed        Vital Signs  ED Triage Vitals [03/02/20 1306]   Temperature Pulse Respirations Blood Pressure SpO2   98 8 °F (37 1 °C) 94 20 144/85 98 %      Temp Source Heart Rate Source Patient Position - Orthostatic VS BP Location FiO2 (%)   Tympanic Monitor Lying Left arm --      Pain Score       6           Vitals:    03/02/20 1445 03/02/20 1500 03/02/20 1515 03/02/20 1530   BP: 154/89  162/85    Pulse: 86 88 86 90   Patient Position - Orthostatic VS:             Visual Acuity  Visual Acuity      Most Recent Value   L Pupil Size (mm)  6   R Pupil Size (mm)  6          ED Medications  Medications   sodium chloride 0 9 % bolus 1,000 mL (1,000 mL Intravenous New Bag 3/2/20 1412)   morphine (PF) 4 mg/mL injection 4 mg (4 mg Intravenous Given 3/2/20 1412)       Diagnostic Studies  Results Reviewed     Procedure Component Value Units Date/Time    Troponin I [[id number]]  (Normal) Collected:  03/02/20 1321    Lab Status:  Final result Specimen:  Blood from Arm, Right Updated:  03/02/20 1355     Troponin I <0 02 ng/mL     Comprehensive metabolic panel [[id number]]  (Abnormal) Collected:  03/02/20 1321    Lab Status:  Final result Specimen:  Blood from Arm, Right Updated:  03/02/20 1348     Sodium 138 mmol/L      Potassium 4 6 mmol/L      Chloride 103 mmol/L      CO2 32 mmol/L      ANION GAP 3 mmol/L      BUN 21 mg/dL      Creatinine 0 91 mg/dL      Glucose 99 mg/dL      Calcium 9 3 mg/dL      AST 20 U/L      ALT 31 U/L      Alkaline Phosphatase 77 U/L      Total Protein 7 1 g/dL      Albumin 3 8 g/dL      Total Bilirubin 0 40 mg/dL      eGFR 67 ml/min/1 73sq m     Narrative:       Reji guidelines for Chronic Kidney Disease (CKD):     Stage 1 with normal or high GFR (GFR > 90 mL/min/1 73 square meters)    Stage 2 Mild CKD (GFR = 60-89 mL/min/1 73 square meters)    Stage 3A Moderate CKD (GFR = 45-59 mL/min/1 73 square meters)    Stage 3B Moderate CKD (GFR = 30-44 mL/min/1 73 square meters)    Stage 4 Severe CKD (GFR = 15-29 mL/min/1 73 square meters)    Stage 5 End Stage CKD (GFR <15 mL/min/1 73 square meters)  Note: GFR calculation is accurate only with a steady state creatinine Protime-INR [[id number]]  (Normal) Collected:  03/02/20 1321    Lab Status:  Final result Specimen:  Blood from Arm, Right Updated:  03/02/20 1347     Protime 10 6 seconds      INR 0 98    APTT [[id number]]  (Normal) Collected:  03/02/20 1321    Lab Status:  Final result Specimen:  Blood from Arm, Right Updated:  03/02/20 1347     PTT 25 seconds     CBC and differential [[id number]] Collected:  03/02/20 1321    Lab Status:  Final result Specimen:  Blood from Arm, Right Updated:  03/02/20 1327     WBC 5 73 Thousand/uL      RBC 4 58 Million/uL      Hemoglobin 12 7 g/dL      Hematocrit 40 0 %      MCV 87 fL      MCH 27 7 pg      MCHC 31 8 g/dL      RDW 13 8 %      MPV 10 4 fL      Platelets 064 Thousands/uL      nRBC 0 /100 WBCs      Neutrophils Relative 71 %      Immat GRANS % 0 %      Lymphocytes Relative 18 %      Monocytes Relative 9 %      Eosinophils Relative 2 %      Basophils Relative 0 %      Neutrophils Absolute 4 02 Thousands/µL      Immature Grans Absolute 0 02 Thousand/uL      Lymphocytes Absolute 1 04 Thousands/µL      Monocytes Absolute 0 51 Thousand/µL      Eosinophils Absolute 0 12 Thousand/µL      Basophils Absolute 0 02 Thousands/µL     Fingerstick Glucose (POCT) [[id number]]  (Normal) Collected:  03/02/20 1307    Lab Status:  Final result Updated:  03/02/20 1309     POC Glucose 95 mg/dl                  XR hip/pelv 2-3 vws right if performed   Final Result by Augustina Hayes MD (03/02 1419)      No acute osseous abnormality  Degenerative changes as described  Workstation performed: [id number]         XR hand 3+ views LEFT   Final Result by Augustina Hayes MD (03/02 1417)      No acute osseous abnormality  Degenerative changes as described  Workstation performed: [id number]         XR chest 1 view   Final Result by Augustina Hayes MD (03/02 1416)      Minimal right basilar atelectasis with hemidiaphragm elevation              Workstation performed: [id number] CT spine cervical without contrast   Final Result by Pablo Wesley MD (03/02 1404)      No cervical spine fracture or traumatic malalignment  Workstation performed: [id number]         CT head wo contrast   Final Result by Pablo Wesley MD (03/02 1400)      No acute intracranial abnormality  Workstation performed: [id number]                    Procedures  ECG 12 Lead Documentation Only  Date/Time: 3/2/2020 1:35 PM  Performed by: Neo Brush MD  Authorized by: Neo Brush MD     ECG reviewed by me, the ED Provider: yes    Patient location:  ED  Interpretation:     Interpretation: abnormal    Rate:     ECG rate:  89    ECG rate assessment: normal    Rhythm:     Rhythm: sinus rhythm    QRS:     QRS axis:  Normal  Conduction:     Conduction: normal    ST segments:     ST segments:  Normal  T waves:     T waves: normal    Other findings:     Other findings: LVH               ED Course                               MDM  Number of Diagnoses or Management Options  Diagnosis management comments: STILL NOT FEELING WELL        Disposition  Final diagnoses:   Syncope   Orthostatic hypotension     Time reflects when diagnosis was documented in both MDM as applicable and the Disposition within this note     Time User Action Codes Description Comment    3/2/2020  3:48 PM Barsgerry Sterling Add [R55] Syncope     3/2/2020  3:48 PM Barsgerry Sterling Add [I95 1] Orthostatic hypotension       ED Disposition     ED Disposition Condition Date/Time Comment    Admit Stable Mon Mar 2, 2020  3:48 PM Case was discussed with HOSPITALIST and the patient's admission status was agreed to be Admission Status: observation status to the service of Dr Renetta Coto   Follow-up Information    None         Patient's Medications   Discharge Prescriptions    No medications on file     No discharge procedures on file      PDMP Review     None          ED Provider  Electronically Signed by           Sangeeta Frederick Angelica Diana MD  03/02/20 5536

## 2020-03-02 NOTE — PLAN OF CARE
Problem: Potential for Falls  Goal: Patient will remain free of falls  Description  INTERVENTIONS:  - Assess patient frequently for physical needs  -  Identify cognitive and physical deficits and behaviors that affect risk of falls    -  [place] fall precautions as indicated by assessment   - Educate patient/family on patient safety including physical limitations  - Instruct patient to call for assistance with activity based on assessment  - Modify environment to reduce risk of injury  - Consider OT/PT consult to assist with strengthening/mobility  Outcome: Progressing     Problem: PAIN - ADULT  Goal: Verbalizes/displays adequate comfort level or baseline comfort level  Description  Interventions:  - Encourage patient to monitor pain and request assistance  - Assess pain using appropriate pain scale (0-10)  - Administer analgesics based on type and severity of pain and evaluate response  - Implement non-pharmacological measures as appropriate and evaluate response  - Consider cultural and social influences on pain and pain management  - Notify physician/advanced practitioner if interventions unsuccessful or patient reports new pain   Outcome: Progressing     Problem: SAFETY ADULT  Goal: Maintain or return to baseline ADL function  Description  INTERVENTIONS:  -  Assess patient's ability to carry out ADLs; assess patient's baseline for ADL function and identify physical deficits which impact ability to perform ADLs (bathing, care of mouth/teeth, toileting, grooming, dressing, etc )  - Assess/evaluate cause of self-care deficits   - Assess range of motion  - Assess patient's mobility; develop plan if impaired  - Assess patient's need for assistive devices and provide as appropriate  - Encourage maximum independence but intervene and supervise when necessary  - Involve family in performance of ADLs  - Assess for home care needs following discharge   - Consider OT consult to assist with ADL evaluation and planning for discharge  - Provide patient education as appropriate  Outcome: Progressing  Goal: Maintain or return mobility status to optimal level  Description  INTERVENTIONS:  - Assess patient's baseline mobility status (ambulation, transfers, stairs, etc )    - Identify cognitive and physical deficits and behaviors that affect mobility  - Identify mobility aids required to assist with transfers and/or ambulation (gait belt, sit-to-stand, lift, walker, cane, etc )  - [place] fall precautions as indicated by assessment  - Instruct patient to call for assistance with activity based on assessment  - Consider rehabilitation consult to assist with strengthening/weightbearing, etc    Outcome: Progressing     Problem: CARDIOVASCULAR - ADULT  Goal: Maintains optimal cardiac output and hemodynamic stability  Description  INTERVENTIONS:  - Monitor I/O, vital signs and rhythm  - Monitor for S/S and trends of decreased cardiac output  - Administer and titrate ordered vasoactive medications to optimize hemodynamic stability  - Assess quality of pulses, skin color and temperature  - Assess for signs of decreased coronary artery perfusion  - Instruct patient to report change in severity of symptoms  Outcome: Progressing  Goal: Absence of cardiac dysrhythmias or at baseline rhythm  Description  INTERVENTIONS:  - Continuous cardiac monitoring, vital signs, obtain 12 lead EKG if ordered  - Administer antiarrhythmic and heart rate control medications as ordered  - Monitor electrolytes and administer replacement therapy as ordered  Outcome: Progressing

## 2020-03-02 NOTE — H&P
History and Physical - [facility] Internal Medicine    Patient Information: Angel Dang 62 y o  female MRN: [id number]  Unit/Bed#: [facility] 438-43 Encounter: [id number]  Admitting Physician: Bartolo Santana MD  PCP: Marleny Will MD  Date of Admission:  03/02/20        Hospital Problem List:     Principal Problem:    Syncope  Active Problems: Moderate episode of recurrent major depressive disorder (HCC)    Fibromyalgia    Hypothyroidism    History of vertigo    GERD (gastroesophageal reflux disease)    Cervical myelopathy with cervical radiculopathy    Mixed dyslipidemia      Assessment/Plan:    * Syncope  Assessment & Plan  Presented with syncopal episode after getting up from the bathroom with dizziness without any other preceding or neurological symptoms  Similar 2 other syncopal episodes this month  Patient reported that episode occurs mainly around bedtime after she had taken her night dose of medications  Noted to be positive orthostatic in ED  CT head, C-spine without any acute abnormality  X-ray of the right hip and left hand without any evidence of injury  Patient has history of orthostatic hypotension, symptoms appears to have worsened since November 2019 when she was started on midodrine twice a day which she is compliant with but not compliant with compression stockings  Of note patient was started on Quetiapine in October 2019  EKG without any acute abnormality, initial troponin negative  Patient had echocardiogram and stress test in 2019 which was unremarkable as per patient  CTA head and neck was unremarkable in February 2019  · Telemetry  · Serial cardiac markers  · IV fluids, monitor orthostasis  · Will increase midodrine to t i d   · Compression stockings  · Obtain echocardiogram and stress test from patient's cardiologist  · Cardiology evaluation  · Quetiapine may be contributory, would require follow-up with primary Psychiatry after discharge    Discussed with patient    Moderate episode of recurrent major depressive disorder ([facility] [address] )  Assessment & Plan  On Cymbalta and Quetiapine  On Cymbalta for long time, Quetiapine was initiated in October 2019  Psychiatric follow-up after discharge    History of vertigo  Assessment & Plan  On p r n  Meclizine  Patient denies any recent episodes of vertigo  [facility] referral on discharge      Hypothyroidism  Assessment & Plan  On Synthroid  TSH within normal limit last month    Fibromyalgia  Assessment & Plan  On Cymbalta 60 mg Q 12 hours and gabapentin 600 mg Q HS  Patient reports that she has been on those medication for long time without any recent adjustments    Mixed dyslipidemia  Assessment & Plan  On treatment    Cervical myelopathy with cervical radiculopathy  Assessment & Plan  Status post C-spine surgery, C3-C5 fusion in January 2019    GERD (gastroesophageal reflux disease)  Assessment & Plan  On PPI          VTE Prophylaxis: Enoxaparin (Lovenox)  / sequential compression device   Code Status: Level 1 - Full Code    Anticipated Length of Stay:  Patient will be admitted on an Observation basis with an anticipated length of stay of  < 2 midnights  Justification for Hospital Stay:  Syncope    Total Time for Visit, including Counseling / Coordination of Care: 45 minutes  Greater than 50% of this total time spent on direct patient counseling and coordination of care  Chief Complaint:     Syncope (reports dizziness and syncope over past month   worsening in past 2 wkes  3/1 @ 2330, pt ambulated and became dizzy passing out and falling backward hitting her head and hurting L hand on door)    History of Present Illness:    Mukesh Juan is a 62 y o  female with history of anxiety/depression, fibromyalgia, hypothyroidism, history of orthostatic hypotension and dizziness/vertigo, migraine who presents with syncopal episode    Patient reported that last night around 2330, when she went to the bathroom, after getting up she had a syncopal episode and next thing she remembers is waking of on the floor with her left hand caught in door   heard her falling and found her, patient regained consciousness subsequently but she does not remember the duration  Patient did not report any preceding or subsequent symptoms including palpitation, headache, chest pain, visual speech abnormality, focal numbness weakness or any involuntary movements, incontinence  Patient reported that she may have stood up fast and felt dizzy  She denied any room spinning or vertigo  Patient reports that she has had a history of orthostatic hypotension, 1st she was noted to orthostasis after her C-spine fusion surgery in January 2019 at that time she followed up with Dr Virgil Beavers and underwent stress test and echocardiogram without any significant abnormality  Patient does not remember that how she was treated at that time but review of chart reveals that patient was briefly on midodrine at that time but it was subsequently discontinued  Patient reports that she started having recurrence of symptoms in November 2019 and subsequently she was started again on midodrine which she is compliant with  Patient reported that she had 2 other episodes of syncope earlier this month when she passed out from standing position  All this episode happened around bedtime after she had taken her night dose of medications prior to going to bed  Patient was evaluated in ED, noted to have positive orthostasis  Labs and EKG were unremarkable  QTC was within normal limit  Patient had x-ray of the left hand, right hip, chest, CT of head and C-spine which did not reveal any acute abnormality  Patient received IV morphine and IV fluids and subsequently admitted for further evaluation  Patient is currently lying comfortably in bed, denies any dizziness, vertigo, visual speech abnormality, focal weakness, chest pain, palpitation  Reports mild left hand pain      Review of Systems:    Review of Systems Constitutional: Negative for activity change, appetite change, chills, diaphoresis, fatigue, fever and unexpected weight change  HENT: Positive for tinnitus (Chronic right Ear)  Negative for congestion, hearing loss, rhinorrhea, sore throat and voice change  Eyes: Negative for visual disturbance  Respiratory: Positive for cough (Chronic unchanged) and shortness of breath (Mild chronic secondary to reactive airway disease)  Negative for chest tightness, wheezing and stridor  Cardiovascular: Negative for chest pain, palpitations and leg swelling  Gastrointestinal: Negative for abdominal distention, abdominal pain, constipation, diarrhea, nausea and vomiting  Genitourinary: Negative for difficulty urinating, dysuria and frequency  Musculoskeletal: Negative for arthralgias, back pain and neck stiffness  Skin: Negative for pallor and rash  Neurological: Positive for dizziness and syncope  Negative for seizures, facial asymmetry, speech difficulty and headaches  History of vertigo but denies any vertigo recently   Hematological: Negative for adenopathy  Psychiatric/Behavioral: Negative for agitation and confusion         Past Medical and Surgical History:     Past Medical History:   Diagnosis Date    Abdominal adhesions     Anesthesia complication     difficult to wake up    Anxiety     Depression     Depression     Disease of thyroid gland     Fibromyalgia     GERD (gastroesophageal reflux disease)     Lazy eye     resolved: 3/27/17    Osteopenia     with joint pain-elevated DEV    Psychiatric disorder     Tinnitus     Wears glasses     for reading       Past Surgical History:   Procedure Laterality Date    ABDOMINAL SURGERY      lysis of adhesions x 2    APPENDECTOMY      CERVICAL FUSION      CHOLECYSTECTOMY      open    DILATION AND CURETTAGE OF UTERUS      NEUROMA EXCISION Right 5/26/2017    Procedure: EXCISION MASS / FIBROMA FOOT;  Surgeon: Mely Iqbal DPM;  Location: [facility];  Service:     RIGHT OOPHORECTOMY      WISDOM TOOTH EXTRACTION      x4       Meds/Allergies:    PTA meds:   Prior to Admission Medications   Prescriptions Last Dose Informant Patient Reported? Taking? ALPHAGAN P 0 1 %   Yes No   Si (two) times a day   DULoxetine (CYMBALTA) 60 mg delayed release capsule   No No   Sig: Take 1 capsule (60 mg total) by mouth daily   Patient taking differently: Take 60 mg by mouth 2 (two) times a day    QUEtiapine (SEROquel) 300 mg tablet   Yes No   Sig: Take 300 mg by mouth daily at bedtime    QUEtiapine (SEROquel) 50 mg tablet   Yes No   Sig: Take 50 mg by mouth daily at bedtime   albuterol (PROVENTIL HFA,VENTOLIN HFA) 90 mcg/act inhaler   No No   Sig: Inhale 2 puffs every 4 (four) hours as needed for wheezing   atorvastatin (LIPITOR) 20 mg tablet   Yes No   Sig: Take 20 mg by mouth daily    calcium carbonate-vitamin D (OSCAL-D) 500 mg-200 units per tablet   No No   Sig: Take 1 tablet by mouth daily with breakfast   gabapentin (NEURONTIN) 300 mg capsule   No No   Sig: Take 2 capsules (600 mg total) by mouth daily at bedtime   ipratropium-albuterol (DUO-NEB) 0 5-2 5 mg/3 mL nebulizer solution   No No   Sig: Take 1 vial (3 mL total) by nebulization 4 (four) times a day   levothyroxine 50 mcg tablet   No No   Sig: Take 1 tablet (50 mcg total) by mouth daily   meclizine (ANTIVERT) 25 mg tablet   Yes No   Sig: take 1 tablet by mouth every 6 hours if needed  IF DIZZINESS   midodrine (PROAMATINE) 10 MG tablet  Self Yes No   Sig: Take 10 mg by mouth 2 (two) times a day   omeprazole (PriLOSEC) 40 MG capsule   No No   Sig: take 1 capsule by mouth once daily  BEFORE BREAKFAST   traZODone (DESYREL) 50 mg tablet  Self Yes No   Sig: Take 50 mg by mouth daily at bedtime as needed for sleep      Facility-Administered Medications: None       Allergies:    Allergies   Allergen Reactions    Demerol [Meperidine] Lightheadedness     Reaction Date: 2006;     Sulfa Antibiotics Hives Reaction Date: 11Jan2006; History:     Marital Status: /Civil Union     Substance Use History:   Social History     Substance and Sexual Activity   Alcohol Use Not Currently     Social History     Tobacco Use   Smoking Status Never Smoker   Smokeless Tobacco Never Used     Social History     Substance and Sexual Activity   Drug Use No       Family History:    Family History   Problem Relation Age of Onset    Heart disease Mother     Stroke Mother 58    COPD Mother     Arthritis Mother     Hypertension Father     Kidney disease Brother         kidney transplant    No Known Problems Sister     No Known Problems Maternal Aunt     No Known Problems Maternal Uncle     No Known Problems Paternal Aunt     No Known Problems Paternal Uncle     No Known Problems Maternal Grandmother     No Known Problems Maternal Grandfather     No Known Problems Paternal Grandmother     No Known Problems Paternal Grandfather     ADD / ADHD Neg Hx     Anesthesia problems Neg Hx     Cancer Neg Hx     Clotting disorder Neg Hx     Collagen disease Neg Hx     Diabetes Neg Hx     Dislocations Neg Hx     Learning disabilities Neg Hx     Neurological problems Neg Hx     Osteoporosis Neg Hx     Rheumatologic disease Neg Hx     Scoliosis Neg Hx     Vascular Disease Neg Hx        Physical Exam:     Vitals:   Blood Pressure: (P) 135/79 (03/02/20 1635)  Pulse: (P) 84 (03/02/20 1635)  Temperature: (P) 97 9 °F (36 6 °C) (03/02/20 1635)  Temp Source: (P) Oral (03/02/20 1635)  Respirations: (P) 20 (03/02/20 1635)  SpO2: 96 % (03/02/20 1615)    Physical Exam   Constitutional: She is oriented to person, place, and time  She appears well-developed  No distress  HENT:   Head: Normocephalic and atraumatic  Eyes: Pupils are equal, round, and reactive to light  Neck: Normal range of motion  Neck supple  Mild left paraspinal tenderness   Cardiovascular: Normal rate, regular rhythm and normal heart sounds  Pulmonary/Chest: Effort normal and breath sounds normal  No respiratory distress  She has no wheezes  She has no rales  Abdominal: Soft  Bowel sounds are normal  She exhibits no distension  There is no tenderness  There is no rebound and no guarding  Musculoskeletal: Normal range of motion  She exhibits no edema  Mild bruising over left hand   Neurological: She is alert and oriented to person, place, and time  No cranial nerve deficit  Skin: Skin is warm and dry  No rash noted  Psychiatric: She has a normal mood and affect  Lab Results: I have personally reviewed pertinent reports  Results from last 7 days   Lab Units 03/02/20  1321   WBC Thousand/uL 5 73   HEMOGLOBIN g/dL 12 7   HEMATOCRIT % 40 0   PLATELETS Thousands/uL 224   NEUTROS PCT % 71   LYMPHS PCT % 18   MONOS PCT % 9   EOS PCT % 2     Results from last 7 days   Lab Units 03/02/20  1321   POTASSIUM mmol/L 4 6   CHLORIDE mmol/L 103   CO2 mmol/L 32   BUN mg/dL 21   CREATININE mg/dL 0 91   CALCIUM mg/dL 9 3   ALK PHOS U/L 77   ALT U/L 31   AST U/L 20     Results from last 7 days   Lab Units 03/02/20  1321   INR  0 98       Imaging: I have personally reviewed pertinent reports  Xr Hand 3+ Views Left    Result Date: 3/2/2020  Narrative: LEFT HAND INDICATION:   injury  COMPARISON:  None VIEWS:  XR HAND 3+ VW LEFT Images: 3 For the purposes of institution wide universal language the following terms will apply: (thumb=1st digit/finger, index finger=2nd digit/finger, long finger=3rd digit/finger, ring=4th digit/finger and small finger=5th digit/finger) FINDINGS: There is no acute fracture or dislocation  Degenerative changes of the 1st carpal metacarpal [place]) articulation are mild  No lytic or blastic lesions are seen  Soft tissues are unremarkable  Impression: No acute osseous abnormality  Degenerative changes as described   Workstation performed: [id number]     Xr Hip/pelv 2-3 Vws Right If Performed    Result Date: 3/2/2020  Narrative: RIGHT HIP INDICATION:   TRAUMA  COMPARISON:  3/6/2019 VIEWS:  XR HIP/PELV 2-3 VWS RIGHT W PELVIS IF PERFORMED Images: 3 FINDINGS: There is no acute fracture or dislocation  Mild right hip osteoarthritis is seen  No lytic or blastic osseous lesions  Soft tissues are unremarkable  Degenerative changes pubic symphysis and visualized lower lumbar spine  Impression: No acute osseous abnormality  Degenerative changes as described  Workstation performed: [id number]     Ct Head Wo Contrast    Result Date: 3/2/2020  Narrative: CT BRAIN - WITHOUT CONTRAST INDICATION:   TRAUMA  Fall  COMPARISON:  CT head dated 9/3/2019  TECHNIQUE:  CT examination of the brain was performed  In addition to axial images, coronal 2D reformatted images were created and submitted for interpretation  Radiation dose length product (DLP) for this visit:  801 24 mGy-cm   This examination, like all CT scans performed in the [facility], was performed utilizing techniques to minimize radiation dose exposure, including the use of iterative  reconstruction and automated exposure control  IMAGE QUALITY:  Diagnostic  FINDINGS: PARENCHYMA:  No intracranial mass, mass effect or midline shift  No CT signs of acute infarction  No acute parenchymal hemorrhage  VENTRICLES AND EXTRA-AXIAL SPACES:  Normal for the patient's age  VISUALIZED ORBITS AND PARANASAL SINUSES:  No acute abnormality involving the orbits  Mucosal retention cysts/polyps in the maxillary sinuses bilaterally, left greater than right  No fluid levels are seen  CALVARIUM AND EXTRACRANIAL SOFT TISSUES:  Normal      Impression: No acute intracranial abnormality  Workstation performed: [id number]     Ct Spine Cervical Without Contrast    Result Date: 3/2/2020  Narrative: CT CERVICAL SPINE - WITHOUT CONTRAST INDICATION:   TRAUMA  COMPARISON:  None   TECHNIQUE:  CT examination of the cervical spine was performed without intravenous contrast   Contiguous axial images were obtained  Sagittal and coronal reconstructions were performed  Radiation dose length product (DLP) for this visit:  315 81 mGy-cm   This examination, like all CT scans performed in the [facility], was performed utilizing techniques to minimize radiation dose exposure, including the use of iterative  reconstruction and automated exposure control  IMAGE QUALITY:  Diagnostic  FINDINGS: ALIGNMENT:  Normal alignment of the cervical spine  No subluxation  VERTEBRAL BODIES:  No fracture  DEGENERATIVE CHANGES: Status post prior fusion of C3-4 and C4-5 with prosthesis in place in the intervertebral disc spaces  Mild multilevel cervical degenerative changes are noted without critical central canal stenosis  PREVERTEBRAL AND PARASPINAL SOFT TISSUES:  Unremarkable  THORACIC INLET:  Normal      Impression: No cervical spine fracture or traumatic malalignment  Workstation performed: [id number]     Xr Chest 1 View    Result Date: 3/2/2020  Narrative: CHEST INDICATION:   syncope  COMPARISON:  2/1/2020 EXAM PERFORMED/VIEWS:  XR CHEST 1 VIEW  AP semierect Images: 1 FINDINGS: Cardiomediastinal silhouette appears unremarkable  Minimal right basilar atelectasis with mild hemidiaphragm elevation  No pleural effusions or pneumothorax  Osseous structures appear within normal limits for patient age  Impression: Minimal right basilar atelectasis with hemidiaphragm elevation  Workstation performed: [id number]       XR hip/pelv 2-3 vws right if performed   Final Result      No acute osseous abnormality  Degenerative changes as described  Workstation performed: [id number]         XR hand 3+ views LEFT   Final Result      No acute osseous abnormality  Degenerative changes as described  Workstation performed: [id number]         XR chest 1 view   Final Result      Minimal right basilar atelectasis with hemidiaphragm elevation              Workstation performed: [id number]         CT spine cervical without contrast   Final Result      No cervical spine fracture or traumatic malalignment  Workstation performed: [id number]         CT head wo contrast   Final Result      No acute intracranial abnormality  Workstation performed: [id number]             EKG, Pathology, and Other Studies Reviewed on Admission:   · EKG-normal sinus rhythm at 89 beats per minute, LVH,     Allscripts/EPIC Records Reviewed: Yes     ** Please Note: "This note has been constructed using a voice recognition system  Therefore there may be syntax, spelling, and/or grammatical errors   Please call if you have any questions  "**

## 2020-03-03 ENCOUNTER — APPOINTMENT (OUTPATIENT)
Dept: NON INVASIVE DIAGNOSTICS | Facility: HOSPITAL | Age: 65
End: 2020-03-03
Payer: COMMERCIAL

## 2020-03-03 PROBLEM — N39.41 URGE INCONTINENCE OF URINE: Status: ACTIVE | Noted: 2020-03-03

## 2020-03-03 LAB
ANION GAP SERPL CALCULATED.3IONS-SCNC: 9 MMOL/L (ref 4–13)
BUN SERPL-MCNC: 19 MG/DL (ref 5–25)
CALCIUM SERPL-MCNC: 7.8 MG/DL (ref 8.3–10.1)
CHLORIDE SERPL-SCNC: 108 MMOL/L (ref 100–108)
CO2 SERPL-SCNC: 23 MMOL/L (ref 21–32)
CREAT SERPL-MCNC: 0.74 MG/DL (ref 0.6–1.3)
ERYTHROCYTE [DISTWIDTH] IN BLOOD BY AUTOMATED COUNT: 13.8 % (ref 11.6–15.1)
GFR SERPL CREATININE-BSD FRML MDRD: 86 ML/MIN/1.73SQ M
GLUCOSE P FAST SERPL-MCNC: 111 MG/DL (ref 65–99)
GLUCOSE SERPL-MCNC: 111 MG/DL (ref 65–140)
HCT VFR BLD AUTO: 37.8 % (ref 34.8–46.1)
HGB BLD-MCNC: 10.6 G/DL (ref 11.5–15.4)
MCH RBC QN AUTO: 27.5 PG (ref 26.8–34.3)
MCHC RBC AUTO-ENTMCNC: 28 G/DL (ref 31.4–37.4)
MCV RBC AUTO: 98 FL (ref 82–98)
PLATELET # BLD AUTO: 177 THOUSANDS/UL (ref 149–390)
PMV BLD AUTO: 10.1 FL (ref 8.9–12.7)
POTASSIUM SERPL-SCNC: 4 MMOL/L (ref 3.5–5.3)
RBC # BLD AUTO: 3.85 MILLION/UL (ref 3.81–5.12)
SODIUM SERPL-SCNC: 140 MMOL/L (ref 136–145)
WBC # BLD AUTO: 3.5 THOUSAND/UL (ref 4.31–10.16)

## 2020-03-03 PROCEDURE — 97163 PT EVAL HIGH COMPLEX 45 MIN: CPT

## 2020-03-03 PROCEDURE — 80048 BASIC METABOLIC PNL TOTAL CA: CPT | Performed by: INTERNAL MEDICINE

## 2020-03-03 PROCEDURE — 99244 OFF/OP CNSLTJ NEW/EST MOD 40: CPT | Performed by: INTERNAL MEDICINE

## 2020-03-03 PROCEDURE — 97167 OT EVAL HIGH COMPLEX 60 MIN: CPT

## 2020-03-03 PROCEDURE — 85027 COMPLETE CBC AUTOMATED: CPT | Performed by: INTERNAL MEDICINE

## 2020-03-03 PROCEDURE — 94760 N-INVAS EAR/PLS OXIMETRY 1: CPT

## 2020-03-03 PROCEDURE — 94640 AIRWAY INHALATION TREATMENT: CPT

## 2020-03-03 PROCEDURE — 99232 SBSQ HOSP IP/OBS MODERATE 35: CPT | Performed by: STUDENT IN AN ORGANIZED HEALTH CARE EDUCATION/TRAINING PROGRAM

## 2020-03-03 PROCEDURE — 93660 TILT TABLE EVALUATION: CPT

## 2020-03-03 RX ORDER — BENZONATATE 100 MG/1
100 CAPSULE ORAL 3 TIMES DAILY
Status: DISCONTINUED | OUTPATIENT
Start: 2020-03-03 | End: 2020-03-04 | Stop reason: HOSPADM

## 2020-03-03 RX ORDER — GUAIFENESIN 100 MG/5ML
200 SOLUTION ORAL EVERY 4 HOURS PRN
Status: DISCONTINUED | OUTPATIENT
Start: 2020-03-03 | End: 2020-03-04 | Stop reason: HOSPADM

## 2020-03-03 RX ADMIN — MIDODRINE HYDROCHLORIDE 10 MG: 5 TABLET ORAL at 11:56

## 2020-03-03 RX ADMIN — GABAPENTIN 600 MG: 300 CAPSULE ORAL at 21:48

## 2020-03-03 RX ADMIN — SODIUM CHLORIDE 100 ML/HR: 0.9 INJECTION, SOLUTION INTRAVENOUS at 17:33

## 2020-03-03 RX ADMIN — ACETAMINOPHEN 650 MG: 325 TABLET, FILM COATED ORAL at 08:53

## 2020-03-03 RX ADMIN — KETOROLAC TROMETHAMINE 15 MG: 30 INJECTION, SOLUTION INTRAMUSCULAR at 10:39

## 2020-03-03 RX ADMIN — MIDODRINE HYDROCHLORIDE 10 MG: 5 TABLET ORAL at 17:14

## 2020-03-03 RX ADMIN — QUETIAPINE FUMARATE 300 MG: 100 TABLET ORAL at 21:48

## 2020-03-03 RX ADMIN — BENZONATATE 100 MG: 100 CAPSULE ORAL at 17:13

## 2020-03-03 RX ADMIN — DULOXETINE HYDROCHLORIDE 60 MG: 60 CAPSULE, DELAYED RELEASE ORAL at 21:48

## 2020-03-03 RX ADMIN — PANTOPRAZOLE SODIUM 40 MG: 40 TABLET, DELAYED RELEASE ORAL at 06:30

## 2020-03-03 RX ADMIN — IPRATROPIUM BROMIDE AND ALBUTEROL SULFATE 3 ML: 2.5; .5 SOLUTION RESPIRATORY (INHALATION) at 20:26

## 2020-03-03 RX ADMIN — MIDODRINE HYDROCHLORIDE 10 MG: 5 TABLET ORAL at 06:32

## 2020-03-03 RX ADMIN — BRIMONIDINE TARTRATE 1 DROP: 1.5 SOLUTION OPHTHALMIC at 08:51

## 2020-03-03 RX ADMIN — OYSTER SHELL CALCIUM WITH VITAMIN D 1 TABLET: 500; 200 TABLET, FILM COATED ORAL at 08:49

## 2020-03-03 RX ADMIN — LEVOTHYROXINE SODIUM 50 MCG: 50 TABLET ORAL at 06:30

## 2020-03-03 RX ADMIN — ENOXAPARIN SODIUM 40 MG: 40 INJECTION SUBCUTANEOUS at 08:51

## 2020-03-03 RX ADMIN — IPRATROPIUM BROMIDE AND ALBUTEROL SULFATE 3 ML: 2.5; .5 SOLUTION RESPIRATORY (INHALATION) at 11:20

## 2020-03-03 RX ADMIN — SODIUM CHLORIDE 100 ML/HR: 0.9 INJECTION, SOLUTION INTRAVENOUS at 04:16

## 2020-03-03 RX ADMIN — ATORVASTATIN CALCIUM 20 MG: 20 TABLET, FILM COATED ORAL at 17:13

## 2020-03-03 RX ADMIN — IPRATROPIUM BROMIDE AND ALBUTEROL SULFATE 3 ML: 2.5; .5 SOLUTION RESPIRATORY (INHALATION) at 07:30

## 2020-03-03 RX ADMIN — DULOXETINE HYDROCHLORIDE 60 MG: 60 CAPSULE, DELAYED RELEASE ORAL at 08:49

## 2020-03-03 RX ADMIN — ACETAMINOPHEN 650 MG: 325 TABLET, FILM COATED ORAL at 17:17

## 2020-03-03 RX ADMIN — BENZONATATE 100 MG: 100 CAPSULE ORAL at 21:48

## 2020-03-03 RX ADMIN — BRIMONIDINE TARTRATE 1 DROP: 1.5 SOLUTION OPHTHALMIC at 18:11

## 2020-03-03 NOTE — ASSESSMENT & PLAN NOTE
Presented with syncopal episode after getting up from the bathroom with dizziness without any other preceding or neurological symptoms  Hx 2 prior similar syncopal episodes this month  Patient reported that episode occurs mainly around bedtime after she had taken her night dose of medications  CT head, C-spine without any acute abnormality  Patient has history of orthostatic hypotension, symptoms appears to have worsened since November 2019 when she was started on midodrine twice a day which she is compliant with but not compliant with compression stockings  Positive orthostatic in ED  Patient was started on Quetiapine in October 2019  EKG without any acute abnormality, initial troponin negative  Patient had echocardiogram and stress test in 2019 which was unremarkable as per patient  CTA head and neck was unremarkable in February 2019  · Telemetry shows NSR with no events so far  · Serial cardiac markers negative  · IV fluids, monitor orthostasis - still positive  · increased midodrine to t i d   · Compression stockings  · Cardiology eval appreciated  · Quetiapine may be contributory, would require follow-up with primary Psychiatry after discharge    Discussed with patient

## 2020-03-03 NOTE — ASSESSMENT & PLAN NOTE
Continue Cymbalta 60 mg Q 12 hours and gabapentin 600 mg Q HS  Patient reports that she has been on those medication for long time without any recent adjustments

## 2020-03-03 NOTE — PHYSICAL THERAPY NOTE
PT EVALUATION     03/03/20 1005   Pain Assessment   Pain Assessment Mensah-Baker FACES   Mensah-Baker FACES Pain Rating 4  (L hand pain with bruising)   Home Living   Type of [address]; Laundry in basement;Stairs to enter with rails  (1+1 stairs to enter)   Home Equipment Cane;Walker   Additional Comments patient not using assist device prior to admission   Prior Function   Level of [place] Independent with ADLs and functional mobility   Lives With Spouse   Receives Help From Family   ADL Assistance Independent   IADLs Independent   Falls in the last 6 months 1 to 4   Vocational Part time employment   Restrictions/Precautions   Other Precautions Fall Risk   General   Additional Pertinent History chart reviewed, patient admitted with orthostatic hypotension, with fall/syncope and L hand injury with hand stuck in a door as per patient  Patient with extensive gait dysfunction history and following with neurology due to tremor onset  Family/Caregiver Present No   Cognition   Overall Cognitive Status WFL   Arousal/Participation Cooperative   Orientation Level Oriented X4   Following Commands Follows all commands and directions without difficulty   RLE Assessment   RLE Assessment   (ROM WFL,strength 3+/4- decreased coordination)   LLE Assessment   LLE Assessment   (ROM WFL, strength 3+/4- decreased coordination)   Coordination   Movements are Fluid and Coordinated 0   Bed Mobility   Supine to Sit 7  Independent   Sit to Supine 7  Independent   Transfers   Sit to Stand 5  Supervision   Stand to Sit 5  Supervision   Ambulation/Elevation   Gait Assistance 3  Moderate assist   Additional items Assist x 1;Verbal cues; Tactile cues   Assistive Device   (none)   Distance 20 feet with change in direction, ataxic type gait patterning  second gait with roller walker with supervision, although with min assist at times with change in direction and onset of BLE tremor   Patient states this to be chronic issue and agreed to use of roller walker for safety with prevention of falls   Balance   Static Sitting Fair +   Dynamic Sitting Fair   Static Standing Fair -   Dynamic Standing Fair -   Ambulatory Poor +   Activity Tolerance   Activity Tolerance Patient limited by fatigue  (limited by tremor/gait dysfunction)   Nurse Made Aware yes   Assessment   Prognosis Good   Problem List Decreased strength;Decreased range of motion;Decreased endurance; Impaired balance;Decreased mobility; Decreased coordination   Assessment Patient seen for Physical Therapy evaluation  Patient admitted with Syncope  Comorbidities affecting patient's physical performance include:anxiety/depression, fibromyalgia, hypothyroidism, history of orthostatic hypotension and dizziness/vertigo, migraine,tremor, spinal stenosis, cervical spine surgery, lyme arthritis, alcohol in remission, major depressive disorder, who presents with syncopal episode     Personal factors affecting patient at time of initial evaluation include: inability to ambulate household distances, inability to navigate community distances, inability to navigate level surfaces without external assistance, inability to perform dynamic tasks in community, limited home support, positive fall history, inability to perform current job functions, inability to perform caregiver support/tasks, inability to perform physical activity, inability to perform ADLS and inability to perform IADLS    Prior to admission, patient was independent with functional mobility without assistive device, independent with ADLS, independent with IADLS, ambulating household distance, ambulating community distances, works part time and home with family assist   Please find objective findings from Physical Therapy assessment regarding body systems outlined above with impairments and limitations including weakness, decreased ROM, impaired balance, decreased endurance, impaired coordination, gait deviations, pain, decreased activity tolerance, decreased functional mobility tolerance and fall risk  The Barthel Index was used as a functional outcome tool presenting with a score of 55 today indicating marked limitations of functional mobility and ADLS  Patient's clinical presentation is currently unstable/unpredictable as seen in patient's presentation of vital sign response, changing level of pain, increased fall risk, new onset of impairment of functional mobility, decreased endurance and new onset of weakness  Pt would benefit from continued Physical Therapy treatment to address deficits as defined above and maximize level of functional mobility  As demonstrated by objective findings, the assigned level of complexity for this evaluation is high  Goals   Patient Goals to be able to go home and walk better   STG Expiration Date 03/10/20   Short Term Goal #1 transfers and gait with roller walker independently   Short Term Goal #2 gait endurance to functional household distances, strength BLEs 4-/5    LTG Expiration Date 03/17/20   Long Term Goal #1 transfers and gait with cane independently   Long Term Goal #2 gait endurance to functional community distances, improve gait patterning with out LE tremor to return to independent functional mobility   Plan   Treatment/Interventions ADL retraining;Functional transfer training;LE strengthening/ROM; Elevations; Therapeutic exercise; Endurance training;Patient/family training;Equipment eval/education; Bed mobility;Gait training; Compensatory technique education   PT Frequency 5x/wk   Recommendation   Recommendation Outpatient PT  ([facility])   Barthel Index   Feeding 10   Bathing 0   Grooming Score 0   Dressing Score 5   Bladder Score 10   Bowels Score 10   Toilet Use Score 5   Transfers (Bed/Chair) Score 10   Mobility (Level Surface) Score 0   Stairs Score 5   Barthel Index Score 54   Licensure   [place] License Number  Teena Gene PT [id number]

## 2020-03-03 NOTE — OCCUPATIONAL THERAPY NOTE
OT EVALUATION       03/03/20 1445   Note Type   Note type Eval only   Pain Assessment   Pain Assessment 0-10   Pain Score 6   Pain Type Acute pain   Pain Location Hand;Back   Pain Orientation Left   Home Living   Type of [address]; Laundry in basement  (2 RODOLFO)   Bathroom Shower/Tub Tub/shower unit   Home Equipment Walker;Cane   Prior Function   Level of [place] Independent with ADLs and functional mobility   Lives With [place] Help From Family   ADL Assistance Independent   IADLs Independent   Falls in the last 6 months 1 to 4   Vocational Part time employment   ADL   Eating Assistance 5  Supervision/Setup   Grooming Assistance 5  Supervision/Setup   [address] 5  Supervision/Setup   [address] 4  Minimal Assistance   [address] 5  Supervision/Setup    [facility] 4  [facility] [address]  4  Minimal Assistance   Bed Mobility   Supine to Sit 7  Independent   Transfers   Sit to Stand 5  Supervision   Stand to Sit 5  Supervision   Functional Mobility   Functional Mobility 4  Minimal assistance   Additional Comments few steps with RW   Additional items Rolling walker   Balance   Static Sitting Fair +   Dynamic Sitting Fair   Static Standing Fair   Dynamic Standing Fair -   Activity Tolerance   Activity Tolerance Patient limited by fatigue   RUE Assessment   RUE Assessment WFL  (grossly 4-/5 MMT)   LUE Assessment   LUE Assessment WFL  (grossly 4-/5 MMT, edema/bruising hand from fall limited )   Cognition   Overall Cognitive Status WFL   Arousal/Participation Cooperative   Attention Within functional limits   Orientation Level Oriented X4   Following Commands Follows all commands and directions without difficulty   Assessment   Limitation Decreased ADL status; Decreased UE strength;Decreased Safe judgement during ADL;Decreased endurance;Decreased self-care trans;Decreased high-level ADLs  (decreased balance and mobility )   Prognosis Good   Assessment Patient evaluated by Occupational Therapy  Patient admitted with Syncope  The patients occupational profile, medical and therapy history includes a extensive additional review of physical, cognitive, or psychosocial history related to current functional performance  Comorbidities affecting functional mobility and ADLS include: anxiety/depression, fibromyalgia, hypothyroidism, history of orthostatic hypotension and dizziness/vertigo, migraine,tremor, spinal stenosis, cervical spine surgery, lyme arthritis, alcohol in remission, major depressive disorder, who presents with syncopal episode  Prior to admission, patient was independent with functional mobility without assistive device, independent with ADLS and independent with IADLS  The evaluation identifies the following performance deficits: weakness, impaired balance, decreased endurance, increased fall risk, new onset of impairment of functional mobility, decreased ADLS, decreased IADLS, pain, decreased activity tolerance, decreased safety awareness, impaired judgement and decreased strength, that result in activity limitations and/or participation restrictions  This evaluation requires clinical decision making of high complexity, because the patient presents with comorbidites that affect occupational performance and required significant modification of tasks or assistance with consideration of multiple treatment options  The Barthel Index was used as a functional outcome tool presenting with a score of 55, indicating marked limitations of functional mobility and ADLS  Patient will benefit from skilled Occupational Therapy services to address above deficits and facilitate a safe return to prior level of function     Goals   Patient Goals walk to the bathroom by myself   STG Time Frame   (1-7 days)   Short Term Goal  Goals established to promote patient goal of to walk to the bathroom by myself:  Patient will increase standing tolerance to 3 minutes during ADL task to decrease assistance level and decrease fall risk;  Patient will increase functional mobility to and from bathroom with rolling walker with supervision to increase performance with ADLS and to use a toilet; Patient will tolerate 10 minutes of UE ROM/strengthening to increase general activity tolerance and performance in ADLS/IADLS; Patient will improve functional activity tolerance to 10 minutes of sustained functional tasks to increase participation in basic self-care and decrease assistance level;  Patient will increase dynamic standing balance to fair+ to improve postural stability and decrease fall risk during standing ADLS and transfers  LTG Time Frame   (8-14 days)   Long Term Goal Goals established to promote patient goal of to walk to the bathroom by myself:  Patient will increase standing tolerance to 6 minutes during ADL task to decrease assistance level and decrease fall risk;  Patient will increase functional mobility to and from bathroom with rolling walker independently to increase performance with ADLS and to use a toilet; Patient will tolerate 20 minutes of UE ROM/strengthening to increase general activity tolerance and performance in ADLS/IADLS; Patient will improve functional activity tolerance to 20 minutes of sustained functional tasks to increase participation in basic self-care and decrease assistance level;  Patient will increase dynamic standing balance to good to improve postural stability and decrease fall risk during standing ADLS and transfers       Functional Transfer Goals   Pt Will Perform All Functional Transfers   (STG independent )   ADL Goals   Pt Will Perform Grooming Standing at sink  (STG supervision LTG independent )   Pt Will Perform Bathing   (STG supervision LTG independent )   Pt Will Perform LE Dressing   (STG supervision LTG independent )   Pt Will Perform Toileting   (STG supervision LTG independent )   Plan Treatment Interventions ADL retraining;Functional transfer training;UE strengthening/ROM; Endurance training;Patient/family training;Equipment evaluation/education; Activityengagement; Compensatory technique education   Goal Expiration Date 03/17/20   OT Frequency 3-5x/wk   Recommendation   OT Discharge Recommendation Home with family support  ([facility] outpatient)   Barthel Index   Feeding 10   Bathing 0   Grooming Score 0   Dressing Score 5   Bladder Score 10   Bowels Score 10   Toilet Use Score 5   Transfers (Bed/Chair) Score 10   Mobility (Level Surface) Score 0   Stairs Score 5   Barthel Index Score 54   Licensure   [place] License Number  [facility]ie [facility] OTR/L [id number]

## 2020-03-03 NOTE — UTILIZATION REVIEW
Initial Clinical Review    Admission: Date/Time/Statement: Admission Orders (From admission, onward)     Ordered        03/02/20 1549  Place in Observation (expected length of stay for this patient is less than two midnights)  Once                   Orders Placed This Encounter   Procedures    Place in Observation (expected length of stay for this patient is less than two midnights)     Standing Status:   Standing     Number of Occurrences:   1     Order Specific Question:   Admitting Physician     Answer:   Fabrizio Dukes [[id number]]     Order Specific Question:   Level of Care     Answer:   Med Surg [[id number]]     ED Arrival Information     Expected Arrival Acuity Means of Arrival Escorted By Service Admission Type    - 3/2/2020 11:49 Urgent Walk-In Self Hospitalist Urgent    Arrival Complaint    Hand Injury; Dizziness; Fall w/ LOC last evening         Chief Complaint   Patient presents with    Syncope     reports dizziness and syncope over past month   worsening in past 2 wkes  3/1 @ 2330, pt ambulated and became dizzy passing out and falling backward hitting her head and hurting L hand on door     Assessment/Plan:   Hx SYNCOPE  STOOD UP FAST LAST NIGHT AND HAD SYNCOPE, FELL AND HIT HER HEAD AND HIT HER LT HAND TO THE DOOR  PT IS POSITIVE ORTHOSTATIC IN THE ER  C/O HCHE, LT HAND PAIN, NECK PAIN  LT HAD ECCHYMOSIS AND SWELLING  Syncope  Assessment & Plan  Presented with syncopal episode after getting up from the bathroom with dizziness without any other preceding or neurological symptoms  Similar 2 other syncopal episodes this month  Patient reported that episode occurs mainly around bedtime after she had taken her night dose of medications  Noted to be positive orthostatic in ED  CT head, C-spine without any acute abnormality    X-ray of the right hip and left hand without any evidence of injury  Patient has history of orthostatic hypotension, symptoms appears to have worsened since November 2019 when she was started on midodrine twice a day which she is compliant with but not compliant with compression stockings  Of note patient was started on Quetiapine in October 2019  EKG without any acute abnormality, initial troponin negative  Patient had echocardiogram and stress test in 2019 which was unremarkable as per patient  CTA head and neck was unremarkable in February 2019  · Telemetry  · Serial cardiac markers  · IV fluids, monitor orthostasis  · Will increase midodrine to t i d   · Compression stockings  · Obtain echocardiogram and stress test from patient's cardiologist  · Cardiology evaluation  · Quetiapine may be contributory, would require follow-up with primary Psychiatry after discharge  Discussed with patient      ED Triage Vitals [03/02/20 1306]   Temperature Pulse Respirations Blood Pressure SpO2   98 8 °F (37 1 °C) 94 20 144/85 98 %      Temp Source Heart Rate Source Patient Position - Orthostatic VS BP Location FiO2 (%)   Tympanic Monitor Lying Left arm --      Pain Score       6        Wt Readings from Last 1 Encounters:   03/03/20 78 6 kg (173 lb 4 5 oz)     Additional Vital Signs:   Date/Time  Temp  Pulse  Resp  BP  MAP (mmHg)  SpO2  O2 Device  Patient Position - Orthostatic VS   03/03/20 0730            97 %  None (Room air)     03/03/20 0631    86    100/58        Lying   03/02/20 2335  98 °F (36 7 °C)  82  18  137/74    95 %  None (Room air)  Lying   03/02/20 2157  Eldridge Landau        94 %  None (Room air)     03/02/20 2113    105    100/52        Standing - Orthostatic VS   03/02/20 2106    81    109/55        Sitting - Orthostatic VS   03/02/20 2104  98 4 °F (36 9 °C)  83  18  111/59    93 %  None (Room air)  Lying - Orthostatic VS   03/02/20 1727    99    105/63        Standing - Orthostatic VS   03/02/20 1725        139/75        Sitting - Orthostatic VS   03/02/20 1723    85   20  140/76    98 %  None (Room air)  Lying - Orthostatic VS   Pulse: Simultaneous filing   User may not have seen previous data  at 03/02/20 1723   03/02/20 1635  97 9 °F (36 6 °C)  84  20  135/79    98 %  None (Room air)  Sitting   Pertinent Labs/Diagnostic Test Results:   Results from last 7 days   Lab Units 03/03/20  0445 03/02/20  1321   WBC Thousand/uL 3 50* 5 73   HEMOGLOBIN g/dL 10 6* 12 7   HEMATOCRIT % 37 8 40 0   PLATELETS Thousands/uL 177 224   NEUTROS ABS Thousands/µL  --  4 02     Results from last 7 days   Lab Units 03/03/20  0445 03/02/20  1321   SODIUM mmol/L 140 138   POTASSIUM mmol/L 4 0 4 6   CHLORIDE mmol/L 108 103   CO2 mmol/L 23 32   ANION GAP mmol/L 9 3*   BUN mg/dL 19 21   CREATININE mg/dL 0 74 0 91   EGFR ml/min/1 73sq m 86 67   CALCIUM mg/dL 7 8* 9 3     Results from last 7 days   Lab Units 03/02/20  1321   AST U/L 20   ALT U/L 31   ALK PHOS U/L 77   TOTAL PROTEIN g/dL 7 1   ALBUMIN g/dL 3 8   TOTAL BILIRUBIN mg/dL 0 40     Results from last 7 days   Lab Units 03/02/20  1307   POC GLUCOSE mg/dl 95     Results from last 7 days   Lab Units 03/03/20  0445 03/02/20  1321   GLUCOSE RANDOM mg/dL 111 99     Results from last 7 days   Lab Units 03/02/20  2207 03/02/20  1755 03/02/20  1321   TROPONIN I ng/mL <0 02 <0 02 <0 02     Results from last 7 days   Lab Units 03/02/20  1321   PROTIME seconds 10 6   INR  0 98   PTT seconds 25     3/2  Ct cervical spine=No cervical spine fracture or traumatic malalignment  Ct head=No acute intracranial abnormality  Cxr=Minimal right basilar atelectasis with hemidiaphragm elevation  L hand xray=No acute osseous abnormality  R hip/pelvis xray=No acute osseous abnormality    Ekg= Rhythm: sinus rhythm    QRS:     QRS axis:  Normal  Conduction:     Conduction: normal    ST segments:     ST segments:  Normal  T waves:     T waves: normal    Other findings:     Other findings: LVH    ED Treatment:   Medication Administration from 03/02/2020 1149 to 03/02/2020 1630       Date/Time Order Dose Route Action     03/02/2020 1412 sodium chloride 0 9 % bolus 1,000 mL 1,000 mL Intravenous New Bag     03/02/2020 1412 morphine (PF) 4 mg/mL injection 4 mg 4 mg Intravenous Given        Past Medical History:   Diagnosis Date    Abdominal adhesions     Anesthesia complication     difficult to wake up    Anxiety     Depression     Depression     Disease of thyroid gland     Fibromyalgia     GERD (gastroesophageal reflux disease)     History of cholecystectomy 9/7/2019    Lazy eye     resolved: 3/27/17    Osteopenia     with joint pain-elevated DEV    Psychiatric disorder     Tinnitus     Wears glasses     for reading     Present on Admission:   Syncope   Fibromyalgia   Adult hypothyroidism   Moderate episode of recurrent major depressive disorder (HCC)  Admitting Diagnosis: Orthostatic hypotension [I95 1]  Dizziness [R42]  Syncope [R55]  Hand injury [S69 90XA]  Age/Sex: 62 y o  female  Admission Orders:  Consult cardio  Incentive spirometry  Telemetry  Pt/ot eval & tx  Orthostatic bp  Neuro checks q4h x24h  Thigh high compression stockings  Scheduled Medications:  atorvastatin 20 mg Oral Daily With Dinner   brimonidine 1 drop Both Eyes BID   calcium carbonate-vitamin D 1 tablet Oral Daily With Breakfast   DULoxetine 60 mg Oral BID   enoxaparin 40 mg Subcutaneous Daily   gabapentin 600 mg Oral HS   ipratropium-albuterol 3 mL Nebulization 4x Daily   levothyroxine 50 mcg Oral Early Morning   midodrine 10 mg Oral TID AC   pantoprazole 40 mg Oral Early Morning   QUEtiapine 300 mg Oral HS     Continuous IV Infusions:  sodium chloride 100 mL/hr Intravenous Continuous     PRN Meds:  acetaminophen 650 mg Oral Q6H PRN   ketorolac 15 mg Intravenous Q6H PRN   meclizine 25 mg Oral Q8H PRN     Network Utilization Review Department  [email or web address]  ATTENTION: Please call with any questions or concerns to [phone] and carefully listen to the prompts so that you are directed to the right person   All voicemails are confidential   Ghazal Ramos all requests for admission clinical reviews, approved or denied determinations and any other requests to dedicated fax number below belonging to the campus where the patient is receiving treatment   List of dedicated fax numbers for the Facilities:  [address] [place] [address] DENIALS (Administrative/Medical Necessity) [phone]   [address]  (Maternity/NICU/Pediatrics) [phone]   Eamon Ramos [phone]   Shad Newman [phone]   Imer Aponte [phone]   Ezequiel Night [phone]   [address] [place] [address] [phone]   [facility]  [phone]   [address] [facility]  [address] [facility] [place] [address] [facility] [phone]

## 2020-03-03 NOTE — PLAN OF CARE
Problem: Potential for Falls  Goal: Patient will remain free of falls  Description  INTERVENTIONS:  - Assess patient frequently for physical needs  -  Identify cognitive and physical deficits and behaviors that affect risk of falls    -  [place] fall precautions as indicated by assessment   - Educate patient/family on patient safety including physical limitations  - Instruct patient to call for assistance with activity based on assessment  - Modify environment to reduce risk of injury  - Consider OT/PT consult to assist with strengthening/mobility  Outcome: Progressing     Problem: PAIN - ADULT  Goal: Verbalizes/displays adequate comfort level or baseline comfort level  Description  Interventions:  - Encourage patient to monitor pain and request assistance  - Assess pain using appropriate pain scale (0-10)  - Administer analgesics based on type and severity of pain and evaluate response  - Implement non-pharmacological measures as appropriate and evaluate response  - Consider cultural and social influences on pain and pain management  - Notify physician/advanced practitioner if interventions unsuccessful or patient reports new pain   Outcome: Progressing     Problem: SAFETY ADULT  Goal: Maintain or return to baseline ADL function  Description  INTERVENTIONS:  -  Assess patient's ability to carry out ADLs; assess patient's baseline for ADL function and identify physical deficits which impact ability to perform ADLs (bathing, care of mouth/teeth, toileting, grooming, dressing, etc )  - Assess/evaluate cause of self-care deficits   - Assess range of motion  - Assess patient's mobility; develop plan if impaired  - Assess patient's need for assistive devices and provide as appropriate  - Encourage maximum independence but intervene and supervise when necessary  - Involve family in performance of ADLs  - Assess for home care needs following discharge   - Consider OT consult to assist with ADL evaluation and planning for discharge  - Provide patient education as appropriate  Outcome: Progressing  Goal: Maintain or return mobility status to optimal level  Description  INTERVENTIONS:  - Assess patient's baseline mobility status (ambulation, transfers, stairs, etc )    - Identify cognitive and physical deficits and behaviors that affect mobility  - Identify mobility aids required to assist with transfers and/or ambulation (gait belt, sit-to-stand, lift, walker, cane, etc )  - [place] fall precautions as indicated by assessment  - Instruct patient to call for assistance with activity based on assessment  - Consider rehabilitation consult to assist with strengthening/weightbearing, etc    Outcome: Progressing     Problem: CARDIOVASCULAR - ADULT  Goal: Maintains optimal cardiac output and hemodynamic stability  Description  INTERVENTIONS:  - Monitor I/O, vital signs and rhythm  - Monitor for S/S and trends of decreased cardiac output  - Administer and titrate ordered vasoactive medications to optimize hemodynamic stability  - Assess quality of pulses, skin color and temperature  - Assess for signs of decreased coronary artery perfusion  - Instruct patient to report change in severity of symptoms  Outcome: Progressing  Goal: Absence of cardiac dysrhythmias or at baseline rhythm  Description  INTERVENTIONS:  - Continuous cardiac monitoring, vital signs, obtain 12 lead EKG if ordered  - Administer antiarrhythmic and heart rate control medications as ordered  - Monitor electrolytes and administer replacement therapy as ordered  Outcome: Progressing     Problem: RESPIRATORY - ADULT  Goal: Achieves optimal ventilation and oxygenation  Description  INTERVENTIONS:  - Assess for changes in respiratory status  - Assess for changes in mentation and behavior  - Position to facilitate oxygenation and minimize respiratory effort  - Oxygen administered by appropriate delivery if ordered  - Initiate smoking cessation education as indicated  - Encourage broncho-pulmonary hygiene including cough, deep breathe, Incentive Spirometry  - Assess the need for suctioning and aspirate as needed  - Assess and instruct to report SOB or any respiratory difficulty  - Respiratory Therapy support as indicated  Outcome: Progressing

## 2020-03-03 NOTE — ASSESSMENT & PLAN NOTE
On p r n   Meclizine  Patient denies any recent episodes of vertigo  [facility] referral on discharge

## 2020-03-03 NOTE — PROCEDURES
Head upright tilt table test successfully completed  Please see worksheet for frequent VS and BP trending  Transferred back to room via w/c in stable condition

## 2020-03-03 NOTE — PROGRESS NOTES
Progress Note - Sujey Mckinney 1955, 62 y o  female MRN: [id number]    Unit/Bed#: [facility]02 Encounter: [id number]    Primary Care Provider: Brayan Lechuag MD   Date and time admitted to hospital: 3/2/2020 12:59 PM        * Syncope  Assessment & Plan  Presented with syncopal episode after getting up from the bathroom with dizziness without any other preceding or neurological symptoms  Hx 2 prior similar syncopal episodes this month  Patient reported that episode occurs mainly around bedtime after she had taken her night dose of medications  CT head, C-spine without any acute abnormality  Patient has history of orthostatic hypotension, symptoms appears to have worsened since November 2019 when she was started on midodrine twice a day which she is compliant with but not compliant with compression stockings  Positive orthostatic in ED  Patient was started on Quetiapine in October 2019  EKG without any acute abnormality, initial troponin negative  Patient had echocardiogram and stress test in 2019 which was unremarkable as per patient  CTA head and neck was unremarkable in February 2019  · Telemetry shows NSR with no events so far  · Serial cardiac markers negative  · IV fluids, monitor orthostasis - still positive  · increased midodrine to t i d   · Compression stockings  · Cardiology eval appreciated  · Quetiapine may be contributory, would require follow-up with primary Psychiatry after discharge  Discussed with patient    Urge incontinence of urine  Assessment & Plan  referral will be given to patient for urology on discharge    History of vertigo  Assessment & Plan  On p r n   Meclizine  Patient denies any recent episodes of vertigo  [facility] referral on discharge      Adult hypothyroidism  Assessment & Plan  Continue Synthroid  TSH wnl 1 month ago    Fibromyalgia  Assessment & Plan  Continue Cymbalta 60 mg Q 12 hours and gabapentin 600 mg Q HS  Patient reports that she has been on those medication for long time without any recent adjustments    Mixed dyslipidemia  Assessment & Plan  On treatment    Cervical myelopathy with cervical radiculopathy  Assessment & Plan  Status post C-spine surgery, C3-C5 fusion in 2019    GERD (gastroesophageal reflux disease)  Assessment & Plan  On PPI    Moderate episode of recurrent major depressive disorder (HCC)  Assessment & Plan  Continue Cymbalta and Quetiapine  On Cymbalta for long time, Quetiapine was initiated in 2019  Psychiatric follow-up after discharge        VTE Pharmacologic Prophylaxis:   Pharmacologic: Enoxaparin (Lovenox)  Mechanical VTE Prophylaxis in Place: Yes    Patient Centered Rounds: I have performed bedside rounds with nursing staff today  Discussions with Specialists or Other Care Team Provider: Yes  Education and Discussions with Family / Patient:Yes  Time Spent for Care: 30 minutes  More than 50% of total time spent on counseling and coordination of care as described above  Current Length of Stay: 0 day(s)  Current Patient Status: Observation     Discharge Plan: pending    Code Status: Level 1 - Full Code      Subjective:   Patient still has dizziness, noted today when she got up to get washed  She forgo to mention on admission that she has chronic urinary incontinence with urge sensation  She has never seen anyone about this issue  Objective:     Vitals:   Temp (24hrs), Av 3 °F (36 8 °C), Min:97 9 °F (36 6 °C), Max:98 8 °F (37 1 °C)    Temp:  [97 9 °F (36 6 °C)-98 8 °F (37 1 °C)] 98 °F (36 7 °C)  HR:  [] 86  Resp:  [15-24] 18  BP: ()/(52-97) 100/58  SpO2:  [93 %-98 %] 97 %  Body mass index is 29 74 kg/m²  Input and Output Summary (last 24 hours):   No intake or output data in the 24 hours ending 20 0809     Physical Exam:     Physical Exam   Constitutional: She is oriented to person, place, and time  She appears well-developed  No distress     HENT:   Head: Normocephalic and atraumatic  Cardiovascular: Normal rate, regular rhythm and normal heart sounds  Pulmonary/Chest: Effort normal and breath sounds normal  No respiratory distress  She has no wheezes  She has no rales  Abdominal: Soft  Bowel sounds are normal  She exhibits no distension  There is no tenderness  There is no rebound  Musculoskeletal: She exhibits no edema, tenderness or deformity  Neurological: She is alert and oriented to person, place, and time  Skin: Skin is warm and dry  Psychiatric: She has a normal mood and affect  Her behavior is normal    Nursing note and vitals reviewed  Additional Data:     Labs:    Results from last 7 days   Lab Units 03/03/20  0445 03/02/20  1321   WBC Thousand/uL 3 50* 5 73   HEMOGLOBIN g/dL 10 6* 12 7   HEMATOCRIT % 37 8 40 0   PLATELETS Thousands/uL 177 224   NEUTROS PCT %  --  71     Results from last 7 days   Lab Units 03/03/20  0445 03/02/20  1321   SODIUM mmol/L 140 138   POTASSIUM mmol/L 4 0 4 6   CHLORIDE mmol/L 108 103   CO2 mmol/L 23 32   BUN mg/dL 19 21   CREATININE mg/dL 0 74 0 91   CALCIUM mg/dL 7 8* 9 3   TOTAL BILIRUBIN mg/dL  --  0 40   ALK PHOS U/L  --  77   ALT U/L  --  31   AST U/L  --  20     Results from last 7 days   Lab Units 03/02/20  1321   INR  0 98     Results from last 7 days   Lab Units 03/02/20  2207 03/02/20  1755 03/02/20  1321   TROPONIN I ng/mL <0 02 <0 02 <0 02     Lab Results   Component Value Date/Time    HGBA1C 6 1 (H) 02/06/2020 08:20 AM     Results from last 7 days   Lab Units 03/02/20  1307   POC GLUCOSE mg/dl 95           * I Have Reviewed All Lab Data Listed Above  * Additional Pertinent Lab Tests Reviewed: All Labs Within Last 24 Hours Reviewed    Imaging:     XR hip/pelv 2-3 vws right if performed   Final Result by Lynda Macario MD (03/02 1419)      No acute osseous abnormality  Degenerative changes as described              Workstation performed: [id number]         XR hand 3+ views LEFT   Final Result by Lynda Macario, MD (03/02 1417)      No acute osseous abnormality  Degenerative changes as described  Workstation performed: [id number]         XR chest 1 view   Final Result by Lynda Macario MD (03/02 1416)      Minimal right basilar atelectasis with hemidiaphragm elevation  Workstation performed: [id number]         CT spine cervical without contrast   Final Result by Barbara Woo MD (03/02 1404)      No cervical spine fracture or traumatic malalignment  Workstation performed: [id number]         CT head wo contrast   Final Result by Barbara Woo MD (03/02 1400)      No acute intracranial abnormality                    Workstation performed: [id number]           Imaging Reports Reviewed by myself    Cultures:   Blood Culture: No results found for: BLOODCX  Urine Culture: No results found for: URINECX  Sputum Culture: No components found for: SPUTUMCX  Wound Culture: No results found for: WOUNDCULT    Last 24 Hours Medication List:     Current Facility-Administered Medications:  acetaminophen 650 mg Oral Q6H PRN Glenna Ahumada MD    atorvastatin 20 mg Oral Daily With Harini Whitmore MD    brimonidine 1 drop Both Eyes BID Glenna Ahumada MD    calcium carbonate-vitamin D 1 tablet Oral Daily With Devorah Henriquez MD    DULoxetine 60 mg Oral BID Glenna Ahumada MD    enoxaparin 40 mg Subcutaneous Daily Glenna Ahumada MD    gabapentin 600 mg Oral HS Glenna Ahumada MD    ipratropium-albuterol 3 mL Nebulization 4x Daily Glenna Ahumada MD    ketorolac 15 mg Intravenous Q6H PRN Glenna Ahumada MD    levothyroxine 50 mcg Oral Early Morning Glenna Ahumada MD    meclizine 25 mg Oral Q8H PRN Glenna Ahumada MD    midodrine 10 mg Oral TID AC Glenna Ahumada MD    pantoprazole 40 mg Oral Early Morning Glenna Ahumada MD    QUEtiapine 300 mg Oral HS Glenna Ahumada MD    sodium chloride 100 mL/hr Intravenous Continuous Glenna Ahumada MD Last Rate: 100 mL/hr (03/03/20 0416)        Today, Patient Was Seen By: Tammy Florence MD    ** Please Note: Dragon 360 Dictation voice to text software may have been used in the creation of this document   **

## 2020-03-03 NOTE — ASSESSMENT & PLAN NOTE
Continue Cymbalta and Quetiapine  On Cymbalta for long time, Quetiapine was initiated in October 2019  Psychiatric follow-up after discharge

## 2020-03-03 NOTE — CONSULTS
Consultation - Cardiology   Vee Keller 62 y o  female MRN: [id number]  Unit/Bed#: [address] [place] [address] Encounter: [id number]    Assessment/Plan     Assessment:  1  Syncope secondary to orthostatic hypotension      Plan:  Patient has been admitted to the hospitalist service  1  Long discussion with patient regarding mechanism of orthostasis  Strongly suggest patient wear compression stockings every day would start with 15-20 mm Hg and can graduate to 20-30 mm Hg  These may be purchased through companies like [employer]  2  Discussed with patient importance of staying well hydrated and increasing her salt intake  Patient states she does not usually like to eat salty foods, discussed possibly adding salt tablets but this may increase GI upset  3  Fluid intake should be at least 4 L per day of a non caffeinated beverage, patient may want to try electrolyte replacement solutions such as Gatorade  4  If nonpharmacological intervention does not work patient may need to add Florinef +/-sodium chloride tablets  5  Patient instructed to follow-up with her primary cardiologist in the outpatient setting  History of Present Illness   Physician Requesting Consult: Ron Jensen MD  Reason for Consult / Principal Problem:  Dizziness      HPI: Vee Keller is a 62y o  year old female who notes a history of orthostatic hypotension which appears of worsened in conjunction with cervical spine surgery  She presented to the emergency room on 03/02/2020 after she experienced a syncopal episode at approximately 11:30 a m  The night before  She states she has gotten up to use the bathroom, was getting up and felt slightly lightheaded , she awoke with trauma to her left hand and also hitting her head  She was out very briefly    Her orthostatic vital signs here in hospital have been positive and she also had a positive tilt-table test   She follows with Dr Gus Hernandez and has been taking midodrine 10 mg twice a day at breakfast and at dinner time  Since admission to the hospital midodrine was increased to 3 times a day  She was not you wearing compression stockings, but she states she was attempting to stay well hydrated  Patient has had previous echocardiogram and nuclear stress test performed both Dr Dagoberto Vázquez' office and also at [facility] in November of 2019  All testing was within normal limits  Consult to cardiology  Consult performed by: SHRAVAN Leiva  Consult ordered by: Scott Pruitt MD          Review of Systems   Constitutional: Negative  Negative for activity change, appetite change and fatigue  HENT: Negative  Negative for congestion, ear discharge, nosebleeds, sinus pain, tinnitus and voice change  Eyes: Negative  Negative for photophobia and visual disturbance  Respiratory: Negative  Negative for cough, chest tightness, shortness of breath and wheezing  Cardiovascular: Negative  Negative for chest pain, palpitations and leg swelling  Gastrointestinal: Negative  Negative for abdominal distention, diarrhea, nausea and vomiting  Endocrine: Negative  Negative for polydipsia, polyphagia and polyuria  Genitourinary: Negative  Musculoskeletal: Negative  Neurological: Positive for dizziness, syncope, weakness and light-headedness  Hematological: Negative  Psychiatric/Behavioral: Negative          Historical Information   Past Medical History:   Diagnosis Date    Abdominal adhesions     Anesthesia complication     difficult to wake up    Anxiety     Depression     Depression     Disease of thyroid gland     Fibromyalgia     GERD (gastroesophageal reflux disease)     History of cholecystectomy 9/7/2019    Lazy eye     resolved: 3/27/17    Osteopenia     with joint pain-elevated DEV    Psychiatric disorder     Tinnitus     Wears glasses     for reading     Past Surgical History:   Procedure Laterality Date    ABDOMINAL SURGERY      lysis of adhesions x 2    APPENDECTOMY      CERVICAL FUSION      CHOLECYSTECTOMY      open    DILATION AND CURETTAGE OF UTERUS      NEUROMA EXCISION Right 5/26/2017    Procedure: EXCISION MASS / FIBROMA FOOT;  Surgeon: Nena Rose DPM;  Location: [facility];  Service:     RIGHT OOPHORECTOMY      WISDOM TOOTH EXTRACTION      x4     Social History     Substance and Sexual Activity   Alcohol Use Not Currently    Frequency: Never    Binge frequency: Never     Social History     Substance and Sexual Activity   Drug Use Never     E-Cigarette/Vaping    E-Cigarette Use Never User      E-Cigarette/Vaping Substances    Nicotine No     THC No     CBD No     Flavoring No     Other No     Unknown No      Social History     Tobacco Use   Smoking Status Never Smoker   Smokeless Tobacco Never Used     Family History:   Family History   Problem Relation Age of Onset    Heart disease Mother     Stroke Mother 58    COPD Mother     Arthritis Mother     Hypertension Father     Kidney disease Brother         kidney transplant    No Known Problems Sister     No Known Problems Maternal Aunt     No Known Problems Maternal Uncle     No Known Problems Paternal Aunt     No Known Problems Paternal Uncle     No Known Problems Maternal Grandmother     No Known Problems Maternal Grandfather     No Known Problems Paternal Grandmother     No Known Problems Paternal Grandfather     ADD / ADHD Neg Hx     Anesthesia problems Neg Hx     Cancer Neg Hx     Clotting disorder Neg Hx     Collagen disease Neg Hx     Diabetes Neg Hx     Dislocations Neg Hx     Learning disabilities Neg Hx     Neurological problems Neg Hx     Osteoporosis Neg Hx     Rheumatologic disease Neg Hx     Scoliosis Neg Hx     Vascular Disease Neg Hx        Meds/Allergies   all current active meds have been reviewed, current meds:   Current Facility-Administered Medications   Medication Dose Route Frequency    acetaminophen (TYLENOL) tablet 650 mg  650 mg Oral Q6H PRN    atorvastatin (LIPITOR) tablet 20 mg  20 mg Oral Daily With Dinner    benzonatate (TESSALON PERLES) capsule 100 mg  100 mg Oral TID    brimonidine (ALPHAGAN P) 0 15 % ophthalmic solution 1 drop  1 drop Both Eyes BID    calcium carbonate-vitamin D (OSCAL-D) 500 mg-200 units per tablet 1 tablet  1 tablet Oral Daily With Breakfast    DULoxetine (CYMBALTA) delayed release capsule 60 mg  60 mg Oral BID    enoxaparin (LOVENOX) subcutaneous injection 40 mg  40 mg Subcutaneous Daily    gabapentin (NEURONTIN) capsule 600 mg  600 mg Oral HS    guaiFENesin (ROBITUSSIN) oral solution 200 mg  200 mg Oral Q4H PRN    ipratropium-albuterol (DUO-NEB) 0 5-2 5 mg/3 mL inhalation solution 3 mL  3 mL Nebulization 4x Daily    ketorolac (TORADOL) injection 15 mg  15 mg Intravenous Q6H PRN    levothyroxine tablet 50 mcg  50 mcg Oral Early Morning    meclizine (ANTIVERT) tablet 25 mg  25 mg Oral Q8H PRN    midodrine (PROAMATINE) tablet 10 mg  10 mg Oral TID AC    pantoprazole (PROTONIX) EC tablet 40 mg  40 mg Oral Early Morning    QUEtiapine (SEROquel) tablet 300 mg  300 mg Oral HS    sodium chloride 0 9 % infusion  100 mL/hr Intravenous Continuous    and PTA meds:   Prior to Admission Medications   Prescriptions Last Dose Informant Patient Reported? Taking?    ALPHAGAN P 0 1 % 3/1/2020 at Unknown time  Yes Yes   Si (two) times a day   DULoxetine (CYMBALTA) 60 mg delayed release capsule 3/2/2020 at Unknown time  No Yes   Sig: Take 1 capsule (60 mg total) by mouth daily   Patient taking differently: Take 60 mg by mouth 2 (two) times a day    QUEtiapine (SEROquel) 300 mg tablet 3/1/2020 at Unknown time  Yes Yes   Sig: Take 300 mg by mouth daily at bedtime    QUEtiapine (SEROquel) 50 mg tablet 3/1/2020 at Unknown time  Yes Yes   Sig: Take 50 mg by mouth daily at bedtime   albuterol (PROVENTIL HFA,VENTOLIN HFA) 90 mcg/act inhaler 3/1/2020 at Unknown time  No Yes   Sig: Inhale 2 puffs every 4 (four) hours as needed for wheezing   atorvastatin (LIPITOR) 20 mg tablet 3/2/2020 at Unknown time  Yes Yes   Sig: Take 20 mg by mouth daily    calcium carbonate-vitamin D (OSCAL-D) 500 mg-200 units per tablet 3/1/2020 at Unknown time  No Yes   Sig: Take 1 tablet by mouth daily with breakfast   gabapentin (NEURONTIN) 300 mg capsule 3/1/2020 at Unknown time  No Yes   Sig: Take 2 capsules (600 mg total) by mouth daily at bedtime   ipratropium-albuterol (DUO-NEB) 0 5-2 5 mg/3 mL nebulizer solution Past Week at Unknown time  No Yes   Sig: Take 1 vial (3 mL total) by nebulization 4 (four) times a day   levothyroxine 50 mcg tablet 3/2/2020 at Unknown time  No Yes   Sig: Take 1 tablet (50 mcg total) by mouth daily   meclizine (ANTIVERT) 25 mg tablet   Yes No   Sig: take 1 tablet by mouth every 6 hours if needed  IF DIZZINESS   midodrine (PROAMATINE) 10 MG tablet 3/2/2020 at Unknown time Self Yes Yes   Sig: Take 10 mg by mouth 2 (two) times a day   omeprazole (PriLOSEC) 40 MG capsule 3/2/2020 at Unknown time  No Yes   Sig: take 1 capsule by mouth once daily  BEFORE BREAKFAST   traZODone (DESYREL) 50 mg tablet Past Week at Unknown time Self Yes Yes   Sig: Take 50 mg by mouth daily at bedtime as needed for sleep      Facility-Administered Medications: None     Allergies   Allergen Reactions    Demerol [Meperidine] Lightheadedness     Reaction Date: 11Jan2006;     Sulfa Antibiotics Hives     Reaction Date: 11Jan2006;        Objective   Vitals: Blood pressure 108/70, pulse 94, temperature 98 5 °F (36 9 °C), temperature source Oral, resp  rate 18, height 5' 4" (1 626 m), weight 78 6 kg (173 lb 4 5 oz), SpO2 97 %    Orthostatic Blood Pressures      Most Recent Value   Blood Pressure  108/70 filed at 03/03/2020 0843   Patient Position - Orthostatic VS  Sitting filed at 03/03/2020 0843          No intake or output data in the 24 hours ending 03/03/20 1641    Invasive Devices     Peripheral Intravenous Line            Peripheral IV 03/02/20 Right Antecubital 1 day                Physical Exam   Constitutional: She is oriented to person, place, and time  She appears well-developed and well-nourished  No distress  HENT:   Head: Normocephalic and atraumatic  Right Ear: External ear normal    Left Ear: External ear normal    Eyes: Pupils are equal, round, and reactive to light  Conjunctivae are normal  Right eye exhibits no discharge  Left eye exhibits no discharge  No scleral icterus  Neck: Normal range of motion  Neck supple  No JVD present  No thyromegaly present  Cardiovascular: Normal rate, regular rhythm, normal heart sounds and intact distal pulses  No murmur heard  Pulmonary/Chest: Effort normal and breath sounds normal  No respiratory distress  She has no wheezes  She has no rales  Abdominal: Soft  Bowel sounds are normal  She exhibits no distension and no mass  No hernia  Musculoskeletal: She exhibits no edema  Superficial trauma and lacerations noted to left hand secondary to her fall/syncopal episode   Neurological: She is alert and oriented to person, place, and time  Skin: Skin is warm and dry  Capillary refill takes less than 2 seconds  She is not diaphoretic  Psychiatric: She has a normal mood and affect  Her behavior is normal  Judgment and thought content normal    Nursing note and vitals reviewed  Lab Results:   I have personally reviewed pertinent lab results      CBC with diff:   Results from last 7 days   Lab Units 03/03/20  0445   WBC Thousand/uL 3 50*   RBC Million/uL 3 85   HEMOGLOBIN g/dL 10 6*   HEMATOCRIT % 37 8   MCV fL 98   MCH pg 27 5   MCHC g/dL 28 0*   RDW % 13 8   MPV fL 10 1   PLATELETS Thousands/uL 177     CMP:   Results from last 7 days   Lab Units 03/03/20  0445 03/02/20  1321   SODIUM mmol/L 140 138   POTASSIUM mmol/L 4 0 4 6   CHLORIDE mmol/L 108 103   CO2 mmol/L 23 32   BUN mg/dL 19 21   CREATININE mg/dL 0 74 0 91   CALCIUM mg/dL 7 8* 9 3   AST U/L  --  20   ALT U/L  --  31   ALK PHOS U/L  --  77   EGFR ml/min/1 73sq m 86 67     Troponin:   0   Lab Value Date/Time    TROPONINI <0 02 03/02/2020 2207    TROPONINI <0 02 03/02/2020 1755    TROPONINI <0 02 03/02/2020 1321    TROPONINI <0 02 02/01/2020 1630    TROPONINI <0 02 02/01/2020 1403    TROPONINI <0 02 12/18/2019 1707    TROPONINI <0 02 12/18/2019 1509    TROPONINI <0 02 09/03/2019 0902    TROPONINI <0 02 03/03/2018 0952     BNP:   Results from last 7 days   Lab Units 03/03/20  0445   POTASSIUM mmol/L 4 0   CHLORIDE mmol/L 108   CO2 mmol/L 23   BUN mg/dL 19   CREATININE mg/dL 0 74   CALCIUM mg/dL 7 8*   EGFR ml/min/1 73sq m 86     Coags:   Results from last 7 days   Lab Units 03/02/20  1321   PTT seconds 25   INR  0 98     Imaging: I have personally reviewed pertinent reports      EKG:  Sinus rhythm without acute changes  VTE Prophylaxis: Sequential compression device Siria Filler)     Code Status: Level 1 - Full Code  Advance Directive and Living Will:      Power of :    POLST:      Jeremie Hall, [facility]   Cardiology

## 2020-03-03 NOTE — SOCIAL WORK
No needs are identified at the time of the initial assessment,  care manager will respond upon request or reassess patient for discharge needs as appropriate  After PT eval today, spoke to Mary Schafer, feels pt may benefit from outpatient therapy

## 2020-03-04 ENCOUNTER — TRANSITIONAL CARE MANAGEMENT (OUTPATIENT)
Dept: FAMILY MEDICINE CLINIC | Facility: CLINIC | Age: 65
End: 2020-03-04

## 2020-03-04 VITALS
TEMPERATURE: 97.6 F | BODY MASS INDEX: 29.28 KG/M2 | OXYGEN SATURATION: 96 % | HEART RATE: 100 BPM | SYSTOLIC BLOOD PRESSURE: 132 MMHG | HEIGHT: 64 IN | RESPIRATION RATE: 20 BRPM | DIASTOLIC BLOOD PRESSURE: 74 MMHG | WEIGHT: 171.52 LBS

## 2020-03-04 LAB
ANION GAP SERPL CALCULATED.3IONS-SCNC: 6 MMOL/L (ref 4–13)
BUN SERPL-MCNC: 13 MG/DL (ref 5–25)
CALCIUM SERPL-MCNC: 8.6 MG/DL (ref 8.3–10.1)
CHLORIDE SERPL-SCNC: 109 MMOL/L (ref 100–108)
CO2 SERPL-SCNC: 27 MMOL/L (ref 21–32)
CREAT SERPL-MCNC: 0.73 MG/DL (ref 0.6–1.3)
ERYTHROCYTE [DISTWIDTH] IN BLOOD BY AUTOMATED COUNT: 13.8 % (ref 11.6–15.1)
GFR SERPL CREATININE-BSD FRML MDRD: 88 ML/MIN/1.73SQ M
GLUCOSE P FAST SERPL-MCNC: 91 MG/DL (ref 65–99)
GLUCOSE SERPL-MCNC: 91 MG/DL (ref 65–140)
HCT VFR BLD AUTO: 36.3 % (ref 34.8–46.1)
HGB BLD-MCNC: 11.3 G/DL (ref 11.5–15.4)
MAGNESIUM SERPL-MCNC: 2.1 MG/DL (ref 1.6–2.6)
MCH RBC QN AUTO: 27.4 PG (ref 26.8–34.3)
MCHC RBC AUTO-ENTMCNC: 31.1 G/DL (ref 31.4–37.4)
MCV RBC AUTO: 88 FL (ref 82–98)
PLATELET # BLD AUTO: 191 THOUSANDS/UL (ref 149–390)
PMV BLD AUTO: 10.4 FL (ref 8.9–12.7)
POTASSIUM SERPL-SCNC: 4.4 MMOL/L (ref 3.5–5.3)
RBC # BLD AUTO: 4.12 MILLION/UL (ref 3.81–5.12)
SODIUM SERPL-SCNC: 142 MMOL/L (ref 136–145)
WBC # BLD AUTO: 3.41 THOUSAND/UL (ref 4.31–10.16)

## 2020-03-04 PROCEDURE — 83735 ASSAY OF MAGNESIUM: CPT | Performed by: STUDENT IN AN ORGANIZED HEALTH CARE EDUCATION/TRAINING PROGRAM

## 2020-03-04 PROCEDURE — 94760 N-INVAS EAR/PLS OXIMETRY 1: CPT

## 2020-03-04 PROCEDURE — 99239 HOSP IP/OBS DSCHRG MGMT >30: CPT | Performed by: STUDENT IN AN ORGANIZED HEALTH CARE EDUCATION/TRAINING PROGRAM

## 2020-03-04 PROCEDURE — 80048 BASIC METABOLIC PNL TOTAL CA: CPT | Performed by: STUDENT IN AN ORGANIZED HEALTH CARE EDUCATION/TRAINING PROGRAM

## 2020-03-04 PROCEDURE — 85027 COMPLETE CBC AUTOMATED: CPT | Performed by: STUDENT IN AN ORGANIZED HEALTH CARE EDUCATION/TRAINING PROGRAM

## 2020-03-04 PROCEDURE — 94640 AIRWAY INHALATION TREATMENT: CPT

## 2020-03-04 RX ORDER — MIDODRINE HYDROCHLORIDE 10 MG/1
10 TABLET ORAL 3 TIMES DAILY
Qty: 90 TABLET | Refills: 0 | Status: SHIPPED | OUTPATIENT
Start: 2020-03-04 | End: 2020-03-05 | Stop reason: SDUPTHER

## 2020-03-04 RX ADMIN — OYSTER SHELL CALCIUM WITH VITAMIN D 1 TABLET: 500; 200 TABLET, FILM COATED ORAL at 08:00

## 2020-03-04 RX ADMIN — ENOXAPARIN SODIUM 40 MG: 40 INJECTION SUBCUTANEOUS at 09:00

## 2020-03-04 RX ADMIN — DULOXETINE HYDROCHLORIDE 60 MG: 60 CAPSULE, DELAYED RELEASE ORAL at 09:00

## 2020-03-04 RX ADMIN — IPRATROPIUM BROMIDE AND ALBUTEROL SULFATE 3 ML: 2.5; .5 SOLUTION RESPIRATORY (INHALATION) at 12:17

## 2020-03-04 RX ADMIN — BENZONATATE 100 MG: 100 CAPSULE ORAL at 09:00

## 2020-03-04 RX ADMIN — SODIUM CHLORIDE 100 ML/HR: 0.9 INJECTION, SOLUTION INTRAVENOUS at 02:55

## 2020-03-04 RX ADMIN — LEVOTHYROXINE SODIUM 50 MCG: 50 TABLET ORAL at 06:20

## 2020-03-04 RX ADMIN — MIDODRINE HYDROCHLORIDE 10 MG: 5 TABLET ORAL at 11:37

## 2020-03-04 RX ADMIN — BRIMONIDINE TARTRATE 1 DROP: 1.5 SOLUTION OPHTHALMIC at 09:00

## 2020-03-04 RX ADMIN — PANTOPRAZOLE SODIUM 40 MG: 40 TABLET, DELAYED RELEASE ORAL at 06:20

## 2020-03-04 RX ADMIN — IPRATROPIUM BROMIDE AND ALBUTEROL SULFATE 3 ML: 2.5; .5 SOLUTION RESPIRATORY (INHALATION) at 09:33

## 2020-03-04 NOTE — PLAN OF CARE
Problem: Potential for Falls  Goal: Patient will remain free of falls  Description  INTERVENTIONS:  - Assess patient frequently for physical needs  -  Identify cognitive and physical deficits and behaviors that affect risk of falls    -  [place] fall precautions as indicated by assessment   - Educate patient/family on patient safety including physical limitations  - Instruct patient to call for assistance with activity based on assessment  - Modify environment to reduce risk of injury  - Consider OT/PT consult to assist with strengthening/mobility  Outcome: Progressing     Problem: PAIN - ADULT  Goal: Verbalizes/displays adequate comfort level or baseline comfort level  Description  Interventions:  - Encourage patient to monitor pain and request assistance  - Assess pain using appropriate pain scale (0-10)  - Administer analgesics based on type and severity of pain and evaluate response  - Implement non-pharmacological measures as appropriate and evaluate response  - Consider cultural and social influences on pain and pain management  - Notify physician/advanced practitioner if interventions unsuccessful or patient reports new pain   Outcome: Progressing     Problem: SAFETY ADULT  Goal: Maintain or return to baseline ADL function  Description  INTERVENTIONS:  -  Assess patient's ability to carry out ADLs; assess patient's baseline for ADL function and identify physical deficits which impact ability to perform ADLs (bathing, care of mouth/teeth, toileting, grooming, dressing, etc )  - Assess/evaluate cause of self-care deficits   - Assess range of motion  - Assess patient's mobility; develop plan if impaired  - Assess patient's need for assistive devices and provide as appropriate  - Encourage maximum independence but intervene and supervise when necessary  - Involve family in performance of ADLs  - Assess for home care needs following discharge   - Consider OT consult to assist with ADL evaluation and planning for discharge  - Provide patient education as appropriate  Outcome: Progressing  Goal: Maintain or return mobility status to optimal level  Description  INTERVENTIONS:  - Assess patient's baseline mobility status (ambulation, transfers, stairs, etc )    - Identify cognitive and physical deficits and behaviors that affect mobility  - Identify mobility aids required to assist with transfers and/or ambulation (gait belt, sit-to-stand, lift, walker, cane, etc )  - [place] fall precautions as indicated by assessment  - Instruct patient to call for assistance with activity based on assessment  - Consider rehabilitation consult to assist with strengthening/weightbearing, etc    Outcome: Progressing     Problem: CARDIOVASCULAR - ADULT  Goal: Maintains optimal cardiac output and hemodynamic stability  Description  INTERVENTIONS:  - Monitor I/O, vital signs and rhythm  - Monitor for S/S and trends of decreased cardiac output  - Administer and titrate ordered vasoactive medications to optimize hemodynamic stability  - Assess quality of pulses, skin color and temperature  - Assess for signs of decreased coronary artery perfusion  - Instruct patient to report change in severity of symptoms  Outcome: Progressing  Goal: Absence of cardiac dysrhythmias or at baseline rhythm  Description  INTERVENTIONS:  - Continuous cardiac monitoring, vital signs, obtain 12 lead EKG if ordered  - Administer antiarrhythmic and heart rate control medications as ordered  - Monitor electrolytes and administer replacement therapy as ordered  Outcome: Progressing     Problem: RESPIRATORY - ADULT  Goal: Achieves optimal ventilation and oxygenation  Description  INTERVENTIONS:  - Assess for changes in respiratory status  - Assess for changes in mentation and behavior  - Position to facilitate oxygenation and minimize respiratory effort  - Oxygen administered by appropriate delivery if ordered  - Initiate smoking cessation education as indicated  - Encourage broncho-pulmonary hygiene including cough, deep breathe, Incentive Spirometry  - Assess the need for suctioning and aspirate as needed  - Assess and instruct to report SOB or any respiratory difficulty  - Respiratory Therapy support as indicated  Outcome: Progressing

## 2020-03-04 NOTE — DISCHARGE SUMMARY
Discharge- Ezio Cordero 1955, 62 y o  female MRN: [id number]    Unit/Bed#: [address] [place] [address] Encounter: [id number]    Primary Care Provider: Severa Comes, MD   Date and time admitted to hospital: 3/2/2020 12:59 PM        * Syncope  Assessment & Plan  · Presented with syncopal episode after getting up from the bathroom with dizziness without any other preceding or neurological symptoms  Hx 2 prior similar episodes  · CT head, C-spine without any acute abnormality  · Patient has history of orthostatic hypotension, symptoms worsened since 11/2019, started on midodrine  but not compliant with compression stockings  · Positive orthostatic in ED  · EKG without any acute abnormality, initial troponin negative  · Echocardiogram and stress test in 2019 which was unremarkable as per patient  · CTA head and neck was unremarkable in February 2019  · Telemetry shows NSR with no events  · Serial cardiac markers negative  · Orthostatic BPs remained positive despite IVFs  · Cardiology consulted, recs appreciated  · Tilt table test was positive  · Patients midodrine was increased to 10mg t i d    · Quetiapine may be contributory, would require follow-up with primary Psychiatry after discharge to discuss  · If patient continues to have orthostasis she may require florinef  She will follow up with her cardiologist as OP    Urge incontinence of urine  Assessment & Plan  referral will be given to patient for urology on discharge    History of vertigo  Assessment & Plan  On p r n   Meclizine  Patient denies any recent episodes of vertigo  [facility] referral on discharge      Adult hypothyroidism  Assessment & Plan  Continue Synthroid  TSH wnl 1 month ago    Fibromyalgia  Assessment & Plan  Continue Cymbalta 60 mg Q 12 hours and gabapentin 600 mg Q HS  Patient reports that she has been on those medication for long time without any recent adjustments    Moderate episode of recurrent major depressive disorder [facility])  Assessment & Plan  Continue Cymbalta and Quetiapine  On Cymbalta for long time, Quetiapine was initiated in October 2019  Psychiatric follow-up after discharge        Discharging Physician / Practitioner: Dixie Nolan MD  PCP: Sebastian Chung MD  Admission Date: 3/2/2020  Discharge Date: 03/04/20    Reason for Admission: Syncope (reports dizziness and syncope over past month   worsening in past 2 wkes    3/1 @ 2330, pt ambulated and became dizzy passing out and falling backward hitting her head and hurting L hand on door)        Resolved Problems  Date Reviewed: 3/2/2020    None          Consultations During Hospital Stay:  Kin Decker TO CARDIOLOGY    Procedures Performed:     · Orthostatic blood pressure: positive   · Tilt table test positive    Significant Findings / Test Results:     ·   Results from last 7 days   Lab Units 03/04/20  0611 03/03/20  0445 03/02/20  1321   WBC Thousand/uL 3 41* 3 50* 5 73   HEMOGLOBIN g/dL 11 3* 10 6* 12 7   PLATELETS Thousands/uL 191 177 224     Results from last 7 days   Lab Units 03/04/20  0611 03/03/20  0445 03/02/20  1321   SODIUM mmol/L 142 140 138   POTASSIUM mmol/L 4 4 4 0 4 6   CHLORIDE mmol/L 109* 108 103   CO2 mmol/L 27 23 32   BUN mg/dL 13 19 21   CREATININE mg/dL 0 73 0 74 0 91   CALCIUM mg/dL 8 6 7 8* 9 3   TOTAL BILIRUBIN mg/dL  --   --  0 40   ALK PHOS U/L  --   --  77   ALT U/L  --   --  31   AST U/L  --   --  20     Results from last 7 days   Lab Units 03/02/20  1321   INR  0 98     Results from last 7 days   Lab Units 03/02/20  2207 03/02/20  1755 03/02/20  1321   TROPONIN I ng/mL <0 02 <0 02 <0 02     Lab Results   Component Value Date/Time    HGBA1C 6 1 (H) 02/06/2020 08:20 AM     Results from last 7 days   Lab Units 03/02/20  1307   POC GLUCOSE mg/dl 95         Blood Culture: No results found for: BLOODCX  Urine Culture: No results found for: URINECX  Sputum Culture: No components found for: SPUTUMCX  Wound Culture: No results found for: WOUNDCULT     XR hip/pelv 2-3 vws right if performed   Final Result by Sb Bob MD (03/02 1419)      No acute osseous abnormality  Degenerative changes as described  Workstation performed: [id number]         XR hand 3+ views LEFT   Final Result by Sb Bob MD (03/02 1417)      No acute osseous abnormality  Degenerative changes as described  Workstation performed: [id number]         XR chest 1 view   Final Result by Sb Bob MD (03/02 1416)      Minimal right basilar atelectasis with hemidiaphragm elevation  Workstation performed: [id number]         CT spine cervical without contrast   Final Result by Brandon Nielson MD (03/02 1404)      No cervical spine fracture or traumatic malalignment  Workstation performed: [id number]         CT head wo contrast   Final Result by Brandon Nielson MD (03/02 1400)      No acute intracranial abnormality  Workstation performed: [id number]                Incidental Findings:   ·      Test Results Pending at Discharge (will require follow up):   ·      Outpatient Tests Requested:  ·     Complications:  none    Reason for Admission:   Chief Complaint   Patient presents with    Syncope     reports dizziness and syncope over past month   worsening in past 2 wkes  3/1 @ 2330, pt ambulated and became dizzy passing out and falling backward hitting her head and hurting L hand on door       Hospital Course:     Per HPI: Ariadna Gonsales is a 62 y o  female patient with a PMH of anxiety/depression, fibromyalgia, hypothyroidism, history of orthostatic hypotension and dizziness/vertigo, migraine who presents with syncopal episode  Patient reported that last night around 2330, when she went to the bathroom, after getting up she had a syncopal episode and next thing she remembers is waking of on the floor with her left hand caught in door     heard her falling and found her, patient regained consciousness subsequently but she does not remember the duration  Patient did not report any preceding or subsequent symptoms including palpitation, headache, chest pain, visual speech abnormality, focal numbness weakness or any involuntary movements, incontinence  Patient reported that she may have stood up fast and felt dizzy  She denied any room spinning or vertigo  Patient reports that she has had a history of orthostatic hypotension, 1st she was noted to orthostasis after her C-spine fusion surgery in January 2019 at that time she followed up with Dr Vin Be and underwent stress test and echocardiogram without any significant abnormality  Patient does not remember that how she was treated at that time but review of chart reveals that patient was briefly on midodrine at that time but it was subsequently discontinued  Patient reports that she started having recurrence of symptoms in November 2019 and subsequently she was started again on midodrine which she is compliant with  Patient reported that she had 2 other episodes of syncope earlier this month when she passed out from standing position  All this episode happened around bedtime after she had taken her night dose of medications prior to going to bed  Patient was evaluated in ED, noted to have positive orthostasis  Labs and EKG were unremarkable  QTC was within normal limit  Patient had x-ray of the left hand, right hip, chest, CT of head and C-spine which did not reveal any acute abnormality  Patient received IV morphine and IV fluids and subsequently admitted for further evaluation  Patient is currently lying comfortably in bed, denies any dizziness, vertigo, visual speech abnormality, focal weakness, chest pain, palpitation  Reports mild left hand pain  Hospital Course:    Please see above list of diagnoses and related plan for additional information       Condition at Discharge: stable       Discharge Day Visit / Exam: Subjective:  Patient feels better, sometimes a little dizzy but significantly better since admission  Vitals: Blood Pressure: 139/80 (03/04/20 0621)  Pulse: 87 (03/04/20 0621)  Temperature: (!) 97 2 °F (36 2 °C) (03/03/20 2245)  Temp Source: Temporal (03/03/20 2245)  Respirations: 18 (03/03/20 2245)  Height: 5' 4" (162 6 cm) (03/02/20 1635)  Weight - Scale: 77 8 kg (171 lb 8 3 oz) (03/04/20 0600)  SpO2: 96 % (03/03/20 2245)  Exam:   Physical Exam   Constitutional: She is oriented to person, place, and time  She appears well-developed  No distress  HENT:   Head: Normocephalic and atraumatic  Cardiovascular: Normal rate, regular rhythm and normal heart sounds  Pulmonary/Chest: Effort normal and breath sounds normal  No respiratory distress  She has no wheezes  She has no rales  Abdominal: Soft  Bowel sounds are normal  She exhibits no distension  There is no tenderness  There is no rebound  Neurological: She is alert and oriented to person, place, and time  Skin: Skin is warm and dry  Psychiatric: She has a normal mood and affect  Her behavior is normal    Nursing note and vitals reviewed  Discharge instructions/Information to patient and family:   See after visit summary for information provided to patient and family  Provisions for Follow-Up Care:  See after visit summary for information related to follow-up care and any pertinent home health orders  Disposition:     Home without outpatient [facility]    Planned Readmission: no     Discharge Statement:  I spent >30 minutes discharging the patient  This time was spent on the day of discharge  I had direct contact with the patient on the day of discharge  Greater than 50% of the total time was spent examining patient, answering all patient questions, arranging and discussing plan of care with patient as well as directly providing post-discharge instructions  Additional time then spent on discharge activities      Discharge Medications:  See after visit summary for reconciled discharge medications provided to patient and family        ** Please Note: This note has been constructed using a voice recognition system **

## 2020-03-04 NOTE — DISCHARGE INSTRUCTIONS
Hypotension   WHAT YOU NEED TO KNOW:   Hypotension is a condition that causes your blood pressure (BP) to drop lower than it should be  Hypotension may be mild, serious, or life-threatening  DISCHARGE INSTRUCTIONS:   Medicines:   · Alpha-adrenoreceptor agonists: These medicines may increase your BP and decrease your symptoms  · Steroids: This medicine helps prevent salt loss from your body  Steroids may also help increase the amount of fluid in your body and raise your BP  · Vasopressors: These medicines help constrict (make smaller) your blood vessels and increase your BP  Vasopressor medicines may increase the blood flow to your brain and help decrease your symptoms  · Antidiuretic hormone: This medicine helps control your BP and helps decrease your need to urinate during the night  · Antiparkinson medicine: This medicine may help increase your standing BP and decrease your symptoms  · Take your medicine as directed  Contact your healthcare provider if you think your medicine is not helping or if you have side effects  Tell him or her if you are allergic to any medicine  Keep a list of the medicines, vitamins, and herbs you take  Include the amounts, and when and why you take them  Bring the list or the pill bottles to follow-up visits  Carry your medicine list with you in case of an emergency  Follow up with your healthcare provider or specialist as directed:  Write down your questions so you remember to ask them during your visits  Check your blood pressure: You may need to check your BP at home  Record the results and bring them with you to follow-up visits  Ask when and how often to check your BP  You may need to wear a BP monitor for up to 24 hours  This will record your blood pressure during your normal daily activities  The monitor will take your BP every 15 to 30 minutes  Try to keep still while your BP is taken   Avoid heavy activity, such as exercise, while you are wearing the monitor  Manage your symptoms:   · Change positions slowly:  When you get out of bed, sit up first, then slowly move your legs to the side of the bed  If you are not having any symptoms, slowly stand up  If you have symptoms, sit down right away  · Exercise and do physical counter maneuvers:  Ask your healthcare provider or specialist about the best exercise plan for you  Physical counter maneuvers may help to increase your BP and increase blood flow to your heart  They include crossing your legs, squatting, and bending at the waist  You can also rise up on your toes while you are standing, and tighten your thigh muscles  · Drink liquids as directed:  Ask your healthcare provider or specialist how much liquid to drink each day and which liquids are best for you  Drink 500 milliliters (½ liter) of liquid quickly in the morning or before meals to help increase your BP  Your healthcare provider may tell you to drink 2 cups of coffee with, or after, breakfast and lunch  The caffeine in the coffee can help prevent a drop in your BP  Your healthcare provider may also give you caffeine pills  · Change how you eat meals: If your BP drops after eating large meals, try to eat smaller meals more often  Eat foods low in carbohydrates and cholesterol to help prevent BP drops after you eat  Ask if you need to increase the amount of sodium (salt) you eat each day  · Raise the head of your bed:  Raise the head of your bed 4 to 8 inches  This may help prevent morning BP drops and decrease the need to urinate during the night  Avoid things that make your hypotension worse:   · Do not drink alcohol:  Alcohol can make your symptoms worse  Ask for information if you need help quitting  · Avoid straining:  Activities and movements that cause you to strain can cause a drop in your BP   Activities to avoid include lifting, coughing, and other movements that increase the feeling of pressure in your chest     · Avoid the heat: This can cause a decrease in your BP  Stay inside during very hot days, or limit the amount of time you are outside  Do not take hot baths  Contact your healthcare provider or specialist if:   · You vomit several times or have diarrhea, and you cannot drink liquid  · You have a fever  · You have new or increased symptoms, such as dizziness, weakness, or fainting  · Your legs, ankles, and feet are swollen, or you gain weight for no known reason  · You have questions or concerns about your condition or care  Seek care immediately or call 911 if:   · You become confused or cannot speak  · You urinate very little or not at all  · You have a seizure  · You have chest pain or trouble breathing  · You have changes in vision or cannot see  © 2017 [employer]  Information is for End User's use only and may not be sold, redistributed or otherwise used for commercial purposes  All illustrations and images included in CareNotes® are the copyrighted property of [employer]  or [employer]ish [employer]alla  The above information is an  only  It is not intended as medical advice for individual conditions or treatments  Talk to your doctor, nurse or pharmacist before following any medical regimen to see if it is safe and effective for you  Syncope   WHAT YOU NEED TO KNOW:   Syncope is also called fainting or passing out  Syncope is a sudden, temporary loss of consciousness, followed by a fall from a standing or sitting position  Syncope ranges from not serious to a sign of a more serious condition that needs to be treated  You can control some health conditions that cause syncope  Your healthcare providers can help you create a plan to manage syncope and prevent episodes  DISCHARGE INSTRUCTIONS:   Seek care immediately if:   · You are bleeding because you hit your head when you fainted       · You suddenly have double vision, difficulty speaking, numbness, and cannot move your arms or legs  · You have chest pain and trouble breathing  · You vomit blood or material that looks like coffee grounds  · You see blood in your bowel movement  Contact your healthcare provider if:   · You have new or worsening symptoms  · You have another syncope episode  · You have a headache, fast heartbeat, or feel too dizzy to stand up  · You have questions or concerns about your condition or care  Follow up with your healthcare provider as directed:  Write down your questions so you remember to ask them during your visits  Manage syncope:   · Keep a record of your syncope episodes  Include your symptoms and your activity before and after the episode  The record can help your healthcare provider find the cause of your syncope and help you manage episodes  · Sit or lie down when needed  This includes when you feel dizzy, your throat is getting tight, and your vision changes  Raise your legs above the level of your heart  · Take slow, deep breaths if you start to breathe faster with anxiety or fear  This can help decrease dizziness and the feeling that you might faint  · Check your blood pressure often  This is important if you take medicine to lower your blood pressure  Check your blood pressure when you are lying down and when you are standing  Ask how often to check during the day  Keep a record of your blood pressure numbers  Your healthcare provider may use the record to help plan your treatment  Prevent a syncope episode:   · Move slowly and let yourself get used to one position before you move to another position  This is very important when you change from a lying or sitting position to a standing position  Take some deep breaths before you stand up from a lying position  Stand up slowly  Sudden movements may cause a fainting spell  Sit on the side of the bed or couch for a few minutes before you stand up   If you are on bedrest, try to be upright for about 2 hours each day, or as directed  Do not lock your legs if you are standing for a long period of time  Move your legs and bend your knees to keep blood flowing  · Follow your healthcare provider's recommendations  Your provider may  recommend that you drink more liquids to prevent dehydration  You may also need to have more salt to keep your blood pressure from dropping too low and causing syncope  Your provider will tell you how much liquid and sodium to have each day  · Watch for signs of low blood sugar  These include hunger, nervousness, sweating, and fast or fluttery heartbeats  Talk with your healthcare provider about ways to keep your blood sugar level steady  · Do not strain if you are constipated  You may faint if you strain to have a bowel movement  Walking is the best way to get your bowels moving  Eat foods high in fiber to make it easier to have a bowel movement  Good examples are high-fiber cereals, beans, vegetables, and whole-grain breads  Prune juice may help make bowel movements softer  · Be careful in hot weather  Heat can cause a syncope episode  Limit activity done outside on hot days  Physical activity in hot weather can lead to dehydration  This can cause an episode  © 2017 [employer]  Information is for End User's use only and may not be sold, redistributed or otherwise used for commercial purposes  All illustrations and images included in CareNotes® are the copyrighted property of [employer]  or [employer]ish Abdalla  The above information is an  only  It is not intended as medical advice for individual conditions or treatments  Talk to your doctor, nurse or pharmacist before following any medical regimen to see if it is safe and effective for you

## 2020-03-04 NOTE — ASSESSMENT & PLAN NOTE
· Presented with syncopal episode after getting up from the bathroom with dizziness without any other preceding or neurological symptoms  Hx 2 prior similar episodes  · CT head, C-spine without any acute abnormality  · Patient has history of orthostatic hypotension, symptoms worsened since 11/2019, started on midodrine  but not compliant with compression stockings  · Positive orthostatic in ED  · EKG without any acute abnormality, initial troponin negative  · Echocardiogram and stress test in 2019 which was unremarkable as per patient  · CTA head and neck was unremarkable in February 2019  · Telemetry shows NSR with no events  · Serial cardiac markers negative  · Orthostatic BPs remained positive despite IVFs  · Cardiology consulted, recs appreciated  · Tilt table test was positive  · Patients midodrine was increased to 10mg t i d    · Quetiapine may be contributory, would require follow-up with primary Psychiatry after discharge to discuss  · If patient continues to have orthostasis she may require florinef   She will follow up with her cardiologist as OP

## 2020-03-05 ENCOUNTER — OFFICE VISIT (OUTPATIENT)
Dept: URGENT CARE | Facility: CLINIC | Age: 65
End: 2020-03-05
Payer: COMMERCIAL

## 2020-03-05 VITALS
OXYGEN SATURATION: 100 % | TEMPERATURE: 98.3 F | HEART RATE: 100 BPM | BODY MASS INDEX: 29.18 KG/M2 | WEIGHT: 170 LBS | RESPIRATION RATE: 18 BRPM | DIASTOLIC BLOOD PRESSURE: 83 MMHG | SYSTOLIC BLOOD PRESSURE: 156 MMHG

## 2020-03-05 DIAGNOSIS — R53.83 FATIGUE, UNSPECIFIED TYPE: Primary | ICD-10-CM

## 2020-03-05 LAB
ATRIAL RATE: 89 BPM
P AXIS: 57 DEGREES
PR INTERVAL: 166 MS
QRS AXIS: -25 DEGREES
QRSD INTERVAL: 76 MS
QT INTERVAL: 354 MS
QTC INTERVAL: 430 MS
T WAVE AXIS: 28 DEGREES
VENTRICULAR RATE: 89 BPM

## 2020-03-05 PROCEDURE — 1111F DSCHRG MED/CURRENT MED MERGE: CPT | Performed by: PHYSICIAN ASSISTANT

## 2020-03-05 PROCEDURE — 93010 ELECTROCARDIOGRAM REPORT: CPT | Performed by: INTERNAL MEDICINE

## 2020-03-05 PROCEDURE — 99213 OFFICE O/P EST LOW 20 MIN: CPT | Performed by: PHYSICIAN ASSISTANT

## 2020-03-05 PROCEDURE — 3077F SYST BP >= 140 MM HG: CPT | Performed by: PHYSICIAN ASSISTANT

## 2020-03-05 PROCEDURE — 3079F DIAST BP 80-89 MM HG: CPT | Performed by: PHYSICIAN ASSISTANT

## 2020-03-05 PROCEDURE — 1036F TOBACCO NON-USER: CPT | Performed by: PHYSICIAN ASSISTANT

## 2020-03-05 RX ORDER — MIDODRINE HYDROCHLORIDE 10 MG/1
10 TABLET ORAL 3 TIMES DAILY
Qty: 90 TABLET | Refills: 0 | Status: SHIPPED | OUTPATIENT
Start: 2020-03-05 | End: 2021-01-21 | Stop reason: SDUPTHER

## 2020-03-05 NOTE — PATIENT INSTRUCTIONS
Hgb 10 8 recommend iron supplement as well as a multivitamin with vitamin B12  F/u with PCP (has appointment) to review hospital labs and imaging  F/u with cardiologist (has appointment) re: orthostatic hypotension  F/u with psych to see if seroquel causing hypotension  Explained to patient from urgent care standpoint vitals are normal and not much that we can address    Follow up with PCP in 3-5 days  Proceed to  ER if symptoms worsen

## 2020-03-05 NOTE — PROGRESS NOTES
[address]        NAME: Ariadna Gonsales is a 62 y o  female  : 1955    MRN: [id number]  DATE: 2020  TIME: 1:19 PM    Assessment and Plan   Fatigue, unspecified type [R53 83]  1  Fatigue, unspecified type           Patient Instructions   Hgb 10 8 recommend iron supplement as well as a multivitamin with vitamin B12  F/u with PCP (has appointment) to review hospital labs and imaging  F/u with cardiologist (has appointment) re: orthostatic hypotension  F/u with psych to see if seroquel causing hypotension  Explained to patient from urgent care standpoint vitals are normal and not much that we can address today    Follow up with PCP in 3-5 days  Proceed to  ER if symptoms worsen  Chief Complaint     Chief Complaint   Patient presents with    Fatigue     was admitted to [facility] on monday due to orthostatic hypotension  states she continues to feel weak and dizzy         History of Present Illness       Dayana Maddox is a 63 y/o female who presents to the clinic c/o fatigue and weakness x 1 month or more  She states that she just got discharged from the hospital yesterday after being admitted for orthostatic hypotension  Per patient all imagine and testing was normal   Patient states that she feels "drained" and exhausted and this was present even prior to hospital stay  She denies any more syncopal episodes since discharge  Upon review of discharge summary- recommend f/u with cardio and psych to see if seroquel cause of hypotension  hbg at discharge 10 8    Review of Systems   Review of Systems   Constitutional: Positive for fatigue  Respiratory: Positive for shortness of breath (BLACK)  Negative for cough, chest tightness, wheezing and stridor  Cardiovascular: Negative for chest pain  Musculoskeletal: Negative for myalgias  Neurological: Positive for weakness  Negative for dizziness, syncope, light-headedness, numbness and headaches       Current Medications       Current Outpatient Medications:     albuterol (PROVENTIL HFA,VENTOLIN HFA) 90 mcg/act inhaler, Inhale 2 puffs every 4 (four) hours as needed for wheezing, Disp: 1 Inhaler, Rfl: 0    ALPHAGAN P 0 1 %, 2 (two) times a day, Disp: , Rfl:     atorvastatin (LIPITOR) 20 mg tablet, Take 20 mg by mouth daily , Disp: , Rfl: 0    calcium carbonate-vitamin D (OSCAL-D) 500 mg-200 units per tablet, Take 1 tablet by mouth daily with breakfast, Disp: 30 tablet, Rfl: 0    DULoxetine (CYMBALTA) 60 mg delayed release capsule, Take 1 capsule (60 mg total) by mouth daily (Patient taking differently: Take 60 mg by mouth 2 (two) times a day ), Disp: 30 capsule, Rfl: 0    gabapentin (NEURONTIN) 300 mg capsule, Take 2 capsules (600 mg total) by mouth daily at bedtime, Disp: 60 capsule, Rfl: 0    ipratropium-albuterol (DUO-NEB) 0 5-2 5 mg/3 mL nebulizer solution, Take 1 vial (3 mL total) by nebulization 4 (four) times a day, Disp: 100 mL, Rfl: 0    levothyroxine 50 mcg tablet, Take 1 tablet (50 mcg total) by mouth daily, Disp: 30 tablet, Rfl: 3    meclizine (ANTIVERT) 25 mg tablet, take 1 tablet by mouth every 6 hours if needed  IF DIZZINESS, Disp: , Rfl: 0    midodrine (PROAMATINE) 10 MG tablet, Take 1 tablet (10 mg total) by mouth 3 (three) times a day, Disp: 90 tablet, Rfl: 0    omeprazole (PriLOSEC) 40 MG capsule, take 1 capsule by mouth once daily  BEFORE BREAKFAST, Disp: 30 capsule, Rfl: 5    QUEtiapine (SEROquel) 300 mg tablet, Take 300 mg by mouth daily at bedtime , Disp: , Rfl:     QUEtiapine (SEROquel) 50 mg tablet, Take 50 mg by mouth daily at bedtime, Disp: , Rfl:     traZODone (DESYREL) 50 mg tablet, Take 50 mg by mouth daily at bedtime as needed for sleep, Disp: , Rfl:   No current facility-administered medications for this visit       Current Allergies     Allergies as of 03/05/2020 - Reviewed 03/05/2020   Allergen Reaction Noted    Demerol [meperidine] Lightheadedness 01/11/2006    Sulfa antibiotics Hives 01/11/2006 The following portions of the patient's history were reviewed and updated as appropriate: allergies, current medications, past family history, past medical history, past social history, past surgical history and problem list      Past Medical History:   Diagnosis Date    Abdominal adhesions     Anesthesia complication     difficult to wake up    Anxiety     Depression     Depression     Disease of thyroid gland     Fibromyalgia     GERD (gastroesophageal reflux disease)     History of cholecystectomy 9/7/2019    Lazy eye     resolved: 3/27/17    Osteopenia     with joint pain-elevated DEV    Psychiatric disorder     Tinnitus     Wears glasses     for reading       Past Surgical History:   Procedure Laterality Date    ABDOMINAL SURGERY      lysis of adhesions x 2    APPENDECTOMY      CERVICAL FUSION      CHOLECYSTECTOMY      open    DILATION AND CURETTAGE OF UTERUS      NEUROMA EXCISION Right 5/26/2017    Procedure: EXCISION MASS / FIBROMA FOOT;  Surgeon: Justin Bradshaw DPM;  Location: [facility];  Service:     RIGHT OOPHORECTOMY      WISDOM TOOTH EXTRACTION      x4       Family History   Problem Relation Age of Onset    Heart disease Mother     Stroke Mother 58    COPD Mother     Arthritis Mother     Hypertension Father     Kidney disease Brother         kidney transplant    No Known Problems Sister     No Known Problems Maternal Aunt     No Known Problems Maternal Uncle     No Known Problems Paternal Aunt     No Known Problems Paternal Uncle     No Known Problems Maternal Grandmother     No Known Problems Maternal Grandfather     No Known Problems Paternal Grandmother     No Known Problems Paternal Grandfather     ADD / ADHD Neg Hx     Anesthesia problems Neg Hx     Cancer Neg Hx     Clotting disorder Neg Hx     Collagen disease Neg Hx     Diabetes Neg Hx     Dislocations Neg Hx     Learning disabilities Neg Hx     Neurological problems Neg Hx     Osteoporosis Neg Hx  Rheumatologic disease Neg Hx     Scoliosis Neg Hx     Vascular Disease Neg Hx      Medications have been verified  Objective   /83   Pulse 100   Temp 98 3 °F (36 8 °C)   Resp 18   Wt 77 1 kg (170 lb)   LMP  (LMP Unknown)   SpO2 100%   BMI 29 18 kg/m²      Physical Exam     Physical Exam   Constitutional: She is oriented to person, place, and time  She appears well-developed and well-nourished  No distress  Cardiovascular: Normal rate, regular rhythm and normal heart sounds  Pulmonary/Chest: Effort normal and breath sounds normal    Neurological: She is alert and oriented to person, place, and time  Skin: There is pallor  Psychiatric: She has a normal mood and affect  Nursing note and vitals reviewed

## 2020-03-10 ENCOUNTER — OFFICE VISIT (OUTPATIENT)
Dept: FAMILY MEDICINE CLINIC | Facility: CLINIC | Age: 65
End: 2020-03-10
Payer: COMMERCIAL

## 2020-03-10 VITALS
HEIGHT: 65 IN | HEART RATE: 96 BPM | TEMPERATURE: 99.1 F | DIASTOLIC BLOOD PRESSURE: 64 MMHG | BODY MASS INDEX: 28.66 KG/M2 | SYSTOLIC BLOOD PRESSURE: 104 MMHG | RESPIRATION RATE: 16 BRPM | WEIGHT: 172 LBS

## 2020-03-10 DIAGNOSIS — M79.7 FIBROMYALGIA: ICD-10-CM

## 2020-03-10 DIAGNOSIS — R55 SYNCOPE, UNSPECIFIED SYNCOPE TYPE: Primary | ICD-10-CM

## 2020-03-10 DIAGNOSIS — E03.9 ADULT HYPOTHYROIDISM: ICD-10-CM

## 2020-03-10 DIAGNOSIS — N39.41 URGE INCONTINENCE OF URINE: ICD-10-CM

## 2020-03-10 DIAGNOSIS — F33.1 MODERATE EPISODE OF RECURRENT MAJOR DEPRESSIVE DISORDER (HCC): ICD-10-CM

## 2020-03-10 DIAGNOSIS — N39.41 URGE INCONTINENCE: ICD-10-CM

## 2020-03-10 PROBLEM — I95.9 HYPOTENSION: Status: RESOLVED | Noted: 2019-03-19 | Resolved: 2020-03-10

## 2020-03-10 PROCEDURE — 99214 OFFICE O/P EST MOD 30 MIN: CPT | Performed by: NURSE PRACTITIONER

## 2020-03-10 PROCEDURE — 1036F TOBACCO NON-USER: CPT | Performed by: NURSE PRACTITIONER

## 2020-03-10 PROCEDURE — 93660 TILT TABLE EVALUATION: CPT | Performed by: INTERNAL MEDICINE

## 2020-03-10 PROCEDURE — 3078F DIAST BP <80 MM HG: CPT | Performed by: NURSE PRACTITIONER

## 2020-03-10 PROCEDURE — 1111F DSCHRG MED/CURRENT MED MERGE: CPT | Performed by: NURSE PRACTITIONER

## 2020-03-10 PROCEDURE — 3074F SYST BP LT 130 MM HG: CPT | Performed by: NURSE PRACTITIONER

## 2020-03-10 PROCEDURE — 3008F BODY MASS INDEX DOCD: CPT | Performed by: NURSE PRACTITIONER

## 2020-03-10 NOTE — PROGRESS NOTES
Assessment/Plan:    1  Syncope, unspecified syncope type  Assessment & Plan:  Positive tilt table test   Tolerating increase in Midodrine  She saw Dr Cornel Ndiaye yesterday and will be going back in 2 weeks for f/u      2  Adult hypothyroidism  Assessment & Plan:  Labs up to date  Stable on current dose      3  Fibromyalgia  Assessment & Plan:  Sees rheumatology next week      4  Moderate episode of recurrent major depressive disorder ([address] )  Assessment & Plan:  Stable  Seroquel was cut in half to see if this helps with her dizziness      5  Urge incontinence  -     Ambulatory referral to Urology; Future    6  Urge incontinence of urine          Patient Instructions   Continue B12 and multivitamin  Increase Vitamin D to 2000 units daily       Return for Next scheduled follow up  Subjective:      Patient ID: Kala Medina is a 62 y o  female  Chief Complaint   Patient presents with   [facility] follow up  Feeling better  Pa[place]       Here today for hospital f/u  She had an episode of syncope, which prompted her ER visit and hospitalization  She had a tilt table test, which was positive  She saw Dr Cornel Ndiaye yesterday who wanted her to cut her Seroquel in half  She has been wearing compression stockings and trying to increase fluids  Has appt with psychiatry in 2 weeks  She complains of fatigue  Stared taking a B supplement and a multivitamin  Offers no other complaints or concerns      The following portions of the patient's history were reviewed and updated as appropriate: allergies, current medications, past family history, past medical history, past social history, past surgical history and problem list     Review of Systems   Constitutional: Positive for fatigue  Negative for chills and fever  Respiratory: Negative for cough, shortness of breath and wheezing  Cardiovascular: Negative for chest pain, palpitations and leg swelling     Gastrointestinal: Negative for abdominal pain, diarrhea, nausea and vomiting  Skin: Negative for rash  Neurological: Positive for dizziness  Negative for headaches  Current Outpatient Medications   Medication Sig Dispense Refill    albuterol (PROVENTIL HFA,VENTOLIN HFA) 90 mcg/act inhaler Inhale 2 puffs every 4 (four) hours as needed for wheezing 1 Inhaler 0    ALPHAGAN P 0 1 % 1 drop 2 (two) times a day       atorvastatin (LIPITOR) 20 mg tablet Take 20 mg by mouth daily   0    calcium carbonate-vitamin D (OSCAL-D) 500 mg-200 units per tablet Take 1 tablet by mouth daily with breakfast 30 tablet 0    DULoxetine (CYMBALTA) 60 mg delayed release capsule Take 1 capsule (60 mg total) by mouth daily (Patient taking differently: Take 60 mg by mouth 2 (two) times a day ) 30 capsule 0    gabapentin (NEURONTIN) 300 mg capsule Take 2 capsules (600 mg total) by mouth daily at bedtime 60 capsule 0    ipratropium-albuterol (DUO-NEB) 0 5-2 5 mg/3 mL nebulizer solution Take 1 vial (3 mL total) by nebulization 4 (four) times a day 100 mL 0    levothyroxine 50 mcg tablet Take 1 tablet (50 mcg total) by mouth daily 30 tablet 3    meclizine (ANTIVERT) 25 mg tablet take 1 tablet by mouth every 6 hours if needed  IF DIZZINESS  0    midodrine (PROAMATINE) 10 MG tablet Take 1 tablet (10 mg total) by mouth 3 (three) times a day 90 tablet 0    omeprazole (PriLOSEC) 40 MG capsule take 1 capsule by mouth once daily  BEFORE BREAKFAST 30 capsule 5    QUEtiapine (SEROquel) 100 mg tablet Take 100 mg by mouth daily at bedtime       QUEtiapine (SEROquel) 50 mg tablet Take 50 mg by mouth daily at bedtime      traZODone (DESYREL) 50 mg tablet Take 50 mg by mouth daily at bedtime as needed for sleep       No current facility-administered medications for this visit          Objective:    /64   Pulse 96   Temp 99 1 °F (37 3 °C)   Resp 16   Ht 5' 4 75" (1 645 m)   Wt 78 kg (172 lb)   LMP  (LMP Unknown)   BMI 28 84 kg/m²        Physical Exam Constitutional: She appears well-developed and well-nourished  HENT:   Head: Normocephalic and atraumatic  Right Ear: Tympanic membrane, external ear and ear canal normal    Left Ear: Tympanic membrane, external ear and ear canal normal    Nose: No mucosal edema or rhinorrhea  Mouth/Throat: Uvula is midline, oropharynx is clear and moist and mucous membranes are normal    Eyes: Conjunctivae are normal    Neck: Neck supple  No edema present  No thyromegaly present  Cardiovascular: Normal rate, regular rhythm, normal heart sounds and intact distal pulses  No murmur heard  Pulmonary/Chest: Effort normal and breath sounds normal    Abdominal: Bowel sounds are normal  She exhibits no distension  There is no splenomegaly or hepatomegaly  There is no tenderness  Lymphadenopathy:        Right cervical: No superficial cervical adenopathy present  Left cervical: No superficial cervical adenopathy present  Skin: Skin is warm, dry and intact  No rash noted  Psychiatric: She has a normal mood and affect  Nursing note and vitals reviewed               Matt Miller

## 2020-03-10 NOTE — ASSESSMENT & PLAN NOTE
Positive tilt table test   Tolerating increase in Midodrine    She saw Dr Tosin Morrison yesterday and will be going back in 2 weeks for f/u

## 2020-03-12 ENCOUNTER — EVALUATION (OUTPATIENT)
Dept: PHYSICAL THERAPY | Facility: CLINIC | Age: 65
End: 2020-03-12
Payer: COMMERCIAL

## 2020-03-12 DIAGNOSIS — R53.1 WEAKNESS: Primary | ICD-10-CM

## 2020-03-12 DIAGNOSIS — R26.2 DIFFICULTY WALKING: ICD-10-CM

## 2020-03-12 PROCEDURE — 97163 PT EVAL HIGH COMPLEX 45 MIN: CPT

## 2020-03-12 NOTE — PROGRESS NOTES
PT Evaluation     Today's date: 3/12/2020  Patient name: Atul Hernandez  : 1955  MRN: [id number]  Referring provider: SHRAVAN Recio  Dx:   Encounter Diagnosis     ICD-10-CM    1  Weakness R53 1 Ambulatory referral to Physical Therapy     PT plan of care cert/re-cert   2  Difficulty walking R26 2 Ambulatory referral to Physical Therapy     PT plan of care cert/re-cert       Start Time: 3376  Stop Time: 1530  Total time in clinic (min): 75 minutes    Assessment  Assessment details: Patient is a 62year old woman who presents to physical therapy after a recent emergency room visit after an episode of orthostatic hypotension  Patient presents with general gait and balance deficits during initial assessment, and lower extremity strength deficits  Rausch Balance Scale is a 48/52 indicating a low fall risk as per current research standards  Patient ambulates with a decrease mitchel, and is unable to increase speed of ambulation without increase in unsteadiness and feeling imbalanced  Further assessments were deferred during today's session due to severe orthostatic hypotension upon standing  Positive orthostatics were done and patient was educated on orthostatic hypotension, taking blood pressure at home to monitor condition, and a home exercise program to decrease symptoms  Patient would benefit from skilled physical therapy in order to address current balance and gait deficits  Plan of care to include balance training, lower extremity strengthening and gait training  Impairments: abnormal coordination, abnormal gait, activity intolerance, impaired balance and safety issue  Other impairment: orthostatic hypotension    Symptom irritability: moderateUnderstanding of Dx/Px/POC: excellent   Prognosis: good    Goals  STG- 4 weeks  In 4 weeks, patient will be independent in home exercise program in order to reduce symptoms at home    In 4 weeks, patient will be able to perform Timed Up and Go under 15 seconds in order to further assess functional mobility  In 4 weeks, patient will be able to perform 5 sit to stand activities without increase in symptoms in order to improve functional mobility  LTG-8 weeks  In 8 weeks, patient will improve gait speed to be 1 2 m/sec in order to decrease risk of falling during functional mobility  In 8 weeks, patient will improve subjective reports of feeling unsteady during walking in order to improve confidence in ambulation  In 8 weeks, patient will improve sit to stand transfers without hands in order to improve score on Rausch Balance Scale and improve functional transfers       Plan  Patient would benefit from: skilled physical therapy  Planned modality interventions: cryotherapy, TENS, thermotherapy: hydrocollator packs, electrical stimulation/Russian stimulation and biofeedback  Planned therapy interventions: abdominal trunk stabilization, activity modification, joint mobilization, IADL retraining, manual therapy, ADL retraining, ADL training, massage, Nguyen taping, motor coordination training, balance/weight bearing training, balance, behavior modification, body mechanics training, breathing training, canalith repositioning, muscle pump exercises, neuromuscular re-education, patient education, postural training, self care, sensory integrative techniques, strengthening, stretching, fine motor coordination training, flexibility, functional ROM exercises, gait training, graded activity, graded exercise, graded motor, home exercise program, transfer training, therapeutic training, therapeutic exercise and therapeutic activities  Frequency: 2x week  Duration in weeks: 16  Plan of Care beginning date: 3/12/2020  Plan of Care expiration date: 7/2/2020  Treatment plan discussed with: patient        Subjective Evaluation    History of Present Illness  Mechanism of injury: Patient presents to physical therapy because of her recent orthostatic hypotension with 3 bad episodes in the past month  In the last occurrence, she got out of bed and then passed out  Patient reports that all of her episodes have been at night  Patient states that her cardiologist suggested cutting a medication in half at night  Patient states that since her visit in the hospital, her walking is difficulty  Patient reports that her turning is difficult and she needs to completely stop in order to prevent falling  During the recent hospitalization, she got breathing treatments, and now has a wheeze  This wheeze is present during stair training and other physical activity       Quality of life: good    Pain  Current pain rating: 3  At best pain ratin  At worst pain ratin  Location: Pain in the left hip due to fibromyalgia   Quality: burning  Relieving factors: ice  Progression: no change    Social Support  Steps to enter house: yes  1  Stairs in house: yes   12  Lives in: [place]  Lives with: spouse    Employment status: working    Diagnostic Tests  CT scan: normal  Treatments  Previous treatment: physical therapy  Patient Goals  Patient goal: Wants to know what she can do in the situation         Objective     Strength/Myotome Testing     Left Hip   Planes of Motion   Flexion: 4-  Extension: 3+  Abduction: 3+  Adduction: 3    Right Hip   Planes of Motion   Flexion: 4  Extension: 3+  Abduction: 3+  Adduction: 3    Left Knee   Extension: 4-    Right Knee   Extension: 4-    Left Ankle/Foot   Dorsiflexion: 4-  Plantar flexion: 4-    Right Ankle/Foot   Dorsiflexion: 4-  Plantar flexion: 4-    Functional Assessment        Comments  Orthostatic hypotension  Supine: 145/90  Sittin/81  Standin/63  After exercise: 129/79    Gait Speed: 33 ft/11 seconds= 3 feet/second    mCITSIB  FTEO: 30 seconds  FTEC: 30 seconds  FTEO on foam: 30 seconds  FTEC on foam: 29 seconds    TUG: Deferred today due to increased unsteadiness and dizziness due to blood pressure     BP at end of session: 132/92 Precautions:   Past Medical History:   Diagnosis Date    Abdominal adhesions     Anesthesia complication     difficult to wake up    Anxiety     Depression     Depression     Disease of thyroid gland     Fibromyalgia     GERD (gastroesophageal reflux disease)     History of cholecystectomy 9/7/2019    Lazy eye     resolved: 3/27/17    Osteopenia     with joint pain-elevated DEV    Psychiatric disorder     Tinnitus     Wears glasses     for reading           Manual                                                                                   Exercise Diary                                                                                                                                                                                                                                                                                      Modalities

## 2020-03-17 ENCOUNTER — APPOINTMENT (OUTPATIENT)
Dept: PHYSICAL THERAPY | Facility: CLINIC | Age: 65
End: 2020-03-17
Payer: COMMERCIAL

## 2020-03-18 ENCOUNTER — TELEPHONE (OUTPATIENT)
Dept: FAMILY MEDICINE CLINIC | Facility: CLINIC | Age: 65
End: 2020-03-18

## 2020-03-18 NOTE — TELEPHONE ENCOUNTER
Patient called is experiencing  Cold   Unknown fever(no access to thermometer)  SOB  achey  Sore throat  No travel    Triaged to [facility]

## 2020-03-18 NOTE — TELEPHONE ENCOUNTER
Spoke to patient she listed her symptoms -and has  Not traveled -as per  González lewis did tell her she does not meet [facility] for covid 19 -to treat her symptoms  And to call back if anything worsens-no appt made at this time--lj

## 2020-03-19 ENCOUNTER — APPOINTMENT (OUTPATIENT)
Dept: PHYSICAL THERAPY | Facility: CLINIC | Age: 65
End: 2020-03-19
Payer: COMMERCIAL

## 2020-03-24 ENCOUNTER — APPOINTMENT (OUTPATIENT)
Dept: PHYSICAL THERAPY | Facility: CLINIC | Age: 65
End: 2020-03-24
Payer: COMMERCIAL

## 2020-03-25 ENCOUNTER — TELEPHONE (OUTPATIENT)
Dept: PHYSICAL THERAPY | Facility: CLINIC | Age: 65
End: 2020-03-25

## 2020-03-25 NOTE — TELEPHONE ENCOUNTER
Left message with patient about scheduling E-Visits due to COVID-19  Patient cancelled all appts and will call to reschedule  Number left for patient call back

## 2020-03-26 ENCOUNTER — APPOINTMENT (OUTPATIENT)
Dept: PHYSICAL THERAPY | Facility: CLINIC | Age: 65
End: 2020-03-26
Payer: COMMERCIAL

## 2020-03-31 ENCOUNTER — APPOINTMENT (OUTPATIENT)
Dept: PHYSICAL THERAPY | Facility: CLINIC | Age: 65
End: 2020-03-31
Payer: COMMERCIAL

## 2020-04-06 ENCOUNTER — HOSPITAL ENCOUNTER (EMERGENCY)
Facility: HOSPITAL | Age: 65
Discharge: ANOTHER HOSPITAL | End: 2020-04-06
Attending: EMERGENCY MEDICINE | Admitting: EMERGENCY MEDICINE
Payer: COMMERCIAL

## 2020-04-06 ENCOUNTER — APPOINTMENT (EMERGENCY)
Dept: RADIOLOGY | Facility: HOSPITAL | Age: 65
End: 2020-04-06
Payer: COMMERCIAL

## 2020-04-06 VITALS
SYSTOLIC BLOOD PRESSURE: 127 MMHG | DIASTOLIC BLOOD PRESSURE: 75 MMHG | WEIGHT: 172 LBS | OXYGEN SATURATION: 95 % | HEIGHT: 64 IN | RESPIRATION RATE: 16 BRPM | HEART RATE: 80 BPM | TEMPERATURE: 97.4 F | BODY MASS INDEX: 29.37 KG/M2

## 2020-04-06 DIAGNOSIS — R45.851 DEPRESSION WITH SUICIDAL IDEATION: Primary | ICD-10-CM

## 2020-04-06 DIAGNOSIS — F32.A DEPRESSION WITH SUICIDAL IDEATION: Primary | ICD-10-CM

## 2020-04-06 LAB
ALBUMIN SERPL BCP-MCNC: 3.9 G/DL (ref 3.5–5)
ALP SERPL-CCNC: 91 U/L (ref 46–116)
ALT SERPL W P-5'-P-CCNC: 27 U/L (ref 12–78)
AMPHETAMINES SERPL QL SCN: NEGATIVE
ANION GAP SERPL CALCULATED.3IONS-SCNC: 7 MMOL/L (ref 4–13)
AST SERPL W P-5'-P-CCNC: 20 U/L (ref 5–45)
BACTERIA UR QL AUTO: ABNORMAL /HPF
BARBITURATES UR QL: NEGATIVE
BASOPHILS # BLD AUTO: 0.03 THOUSANDS/ΜL (ref 0–0.1)
BASOPHILS NFR BLD AUTO: 1 % (ref 0–1)
BENZODIAZ UR QL: NEGATIVE
BILIRUB SERPL-MCNC: 0.3 MG/DL (ref 0.2–1)
BILIRUB UR QL STRIP: NEGATIVE
BUN SERPL-MCNC: 18 MG/DL (ref 5–25)
CALCIUM SERPL-MCNC: 9.2 MG/DL (ref 8.3–10.1)
CHLORIDE SERPL-SCNC: 104 MMOL/L (ref 100–108)
CLARITY UR: CLEAR
CO2 SERPL-SCNC: 30 MMOL/L (ref 21–32)
COCAINE UR QL: NEGATIVE
COLOR UR: YELLOW
CREAT SERPL-MCNC: 1 MG/DL (ref 0.6–1.3)
EOSINOPHIL # BLD AUTO: 0.15 THOUSAND/ΜL (ref 0–0.61)
EOSINOPHIL NFR BLD AUTO: 3 % (ref 0–6)
ERYTHROCYTE [DISTWIDTH] IN BLOOD BY AUTOMATED COUNT: 13.8 % (ref 11.6–15.1)
ETHANOL SERPL-MCNC: <3 MG/DL (ref 0–3)
GFR SERPL CREATININE-BSD FRML MDRD: 60 ML/MIN/1.73SQ M
GLUCOSE SERPL-MCNC: 99 MG/DL (ref 65–140)
GLUCOSE UR STRIP-MCNC: NEGATIVE MG/DL
HCT VFR BLD AUTO: 40.2 % (ref 34.8–46.1)
HGB BLD-MCNC: 12.6 G/DL (ref 11.5–15.4)
HGB UR QL STRIP.AUTO: NEGATIVE
IMM GRANULOCYTES # BLD AUTO: 0.02 THOUSAND/UL (ref 0–0.2)
IMM GRANULOCYTES NFR BLD AUTO: 0 % (ref 0–2)
KETONES UR STRIP-MCNC: NEGATIVE MG/DL
LEUKOCYTE ESTERASE UR QL STRIP: ABNORMAL
LYMPHOCYTES # BLD AUTO: 1.21 THOUSANDS/ΜL (ref 0.6–4.47)
LYMPHOCYTES NFR BLD AUTO: 23 % (ref 14–44)
MCH RBC QN AUTO: 27.7 PG (ref 26.8–34.3)
MCHC RBC AUTO-ENTMCNC: 31.3 G/DL (ref 31.4–37.4)
MCV RBC AUTO: 88 FL (ref 82–98)
METHADONE UR QL: NEGATIVE
MONOCYTES # BLD AUTO: 0.49 THOUSAND/ΜL (ref 0.17–1.22)
MONOCYTES NFR BLD AUTO: 9 % (ref 4–12)
NEUTROPHILS # BLD AUTO: 3.44 THOUSANDS/ΜL (ref 1.85–7.62)
NEUTS SEG NFR BLD AUTO: 64 % (ref 43–75)
NITRITE UR QL STRIP: NEGATIVE
NON-SQ EPI CELLS URNS QL MICRO: ABNORMAL /HPF
NRBC BLD AUTO-RTO: 0 /100 WBCS
OPIATES UR QL SCN: NEGATIVE
PCP UR QL: NEGATIVE
PH UR STRIP.AUTO: 7 [PH]
PLATELET # BLD AUTO: 220 THOUSANDS/UL (ref 149–390)
PMV BLD AUTO: 10.4 FL (ref 8.9–12.7)
POTASSIUM SERPL-SCNC: 4.5 MMOL/L (ref 3.5–5.3)
PROT SERPL-MCNC: 7.5 G/DL (ref 6.4–8.2)
PROT UR STRIP-MCNC: NEGATIVE MG/DL
RBC # BLD AUTO: 4.55 MILLION/UL (ref 3.81–5.12)
RBC #/AREA URNS AUTO: ABNORMAL /HPF
SODIUM SERPL-SCNC: 141 MMOL/L (ref 136–145)
SP GR UR STRIP.AUTO: 1.01 (ref 1–1.03)
THC UR QL: NEGATIVE
TSH SERPL DL<=0.05 MIU/L-ACNC: 3.38 UIU/ML (ref 0.36–3.74)
UROBILINOGEN UR QL STRIP.AUTO: 0.2 E.U./DL
WBC # BLD AUTO: 5.34 THOUSAND/UL (ref 4.31–10.16)
WBC #/AREA URNS AUTO: ABNORMAL /HPF

## 2020-04-06 PROCEDURE — 99285 EMERGENCY DEPT VISIT HI MDM: CPT

## 2020-04-06 PROCEDURE — 99285 EMERGENCY DEPT VISIT HI MDM: CPT | Performed by: EMERGENCY MEDICINE

## 2020-04-06 PROCEDURE — 71045 X-RAY EXAM CHEST 1 VIEW: CPT

## 2020-04-06 PROCEDURE — 36415 COLL VENOUS BLD VENIPUNCTURE: CPT | Performed by: EMERGENCY MEDICINE

## 2020-04-06 PROCEDURE — 80307 DRUG TEST PRSMV CHEM ANLYZR: CPT | Performed by: EMERGENCY MEDICINE

## 2020-04-06 PROCEDURE — 84443 ASSAY THYROID STIM HORMONE: CPT | Performed by: EMERGENCY MEDICINE

## 2020-04-06 PROCEDURE — 80320 DRUG SCREEN QUANTALCOHOLS: CPT | Performed by: EMERGENCY MEDICINE

## 2020-04-06 PROCEDURE — 80053 COMPREHEN METABOLIC PANEL: CPT | Performed by: EMERGENCY MEDICINE

## 2020-04-06 PROCEDURE — 81001 URINALYSIS AUTO W/SCOPE: CPT | Performed by: EMERGENCY MEDICINE

## 2020-04-06 PROCEDURE — 85025 COMPLETE CBC W/AUTO DIFF WBC: CPT | Performed by: EMERGENCY MEDICINE

## 2020-04-06 PROCEDURE — G0480 DRUG TEST DEF 1-7 CLASSES: HCPCS | Performed by: EMERGENCY MEDICINE

## 2020-04-06 PROCEDURE — 93005 ELECTROCARDIOGRAM TRACING: CPT

## 2020-04-06 RX ORDER — ATORVASTATIN CALCIUM 20 MG/1
20 TABLET, FILM COATED ORAL EVERY MORNING
Status: DISCONTINUED | OUTPATIENT
Start: 2020-04-07 | End: 2020-04-07 | Stop reason: HOSPADM

## 2020-04-06 RX ORDER — GABAPENTIN 300 MG/1
600 CAPSULE ORAL
Status: DISCONTINUED | OUTPATIENT
Start: 2020-04-06 | End: 2020-04-07 | Stop reason: HOSPADM

## 2020-04-06 RX ORDER — LEVOTHYROXINE SODIUM 0.05 MG/1
50 TABLET ORAL
Status: DISCONTINUED | OUTPATIENT
Start: 2020-04-07 | End: 2020-04-07 | Stop reason: HOSPADM

## 2020-04-06 RX ORDER — ATORVASTATIN CALCIUM 20 MG/1
20 TABLET, FILM COATED ORAL
Status: DISCONTINUED | OUTPATIENT
Start: 2020-04-06 | End: 2020-04-06

## 2020-04-06 RX ORDER — DULOXETIN HYDROCHLORIDE 30 MG/1
60 CAPSULE, DELAYED RELEASE ORAL DAILY
Status: DISCONTINUED | OUTPATIENT
Start: 2020-04-07 | End: 2020-04-07 | Stop reason: HOSPADM

## 2020-04-06 RX ORDER — GABAPENTIN 300 MG/1
600 CAPSULE ORAL
Status: DISCONTINUED | OUTPATIENT
Start: 2020-04-06 | End: 2020-04-06

## 2020-04-06 RX ORDER — ALPRAZOLAM 0.5 MG/1
1 TABLET ORAL ONCE
Status: COMPLETED | OUTPATIENT
Start: 2020-04-06 | End: 2020-04-06

## 2020-04-06 RX ADMIN — ALPRAZOLAM 1 MG: 0.5 TABLET ORAL at 15:00

## 2020-04-06 RX ADMIN — GABAPENTIN 600 MG: 300 CAPSULE ORAL at 20:23

## 2020-04-06 RX ADMIN — QUETIAPINE FUMARATE 150 MG: 100 TABLET ORAL at 20:23

## 2020-04-08 LAB
ATRIAL RATE: 82 BPM
P AXIS: 64 DEGREES
PR INTERVAL: 176 MS
QRS AXIS: -10 DEGREES
QRSD INTERVAL: 88 MS
QT INTERVAL: 390 MS
QTC INTERVAL: 455 MS
T WAVE AXIS: 16 DEGREES
VENTRICULAR RATE: 82 BPM

## 2020-04-08 PROCEDURE — 93010 ELECTROCARDIOGRAM REPORT: CPT | Performed by: INTERNAL MEDICINE

## 2020-04-23 ENCOUNTER — TELEMEDICINE (OUTPATIENT)
Dept: FAMILY MEDICINE CLINIC | Facility: CLINIC | Age: 65
End: 2020-04-23
Payer: COMMERCIAL

## 2020-04-23 ENCOUNTER — TELEPHONE (OUTPATIENT)
Dept: URGENT CARE | Facility: CLINIC | Age: 65
End: 2020-04-23

## 2020-04-23 DIAGNOSIS — R05.9 COUGH: Primary | ICD-10-CM

## 2020-04-23 DIAGNOSIS — Z20.828 EXPOSURE TO SARS-ASSOCIATED CORONAVIRUS: ICD-10-CM

## 2020-04-23 PROCEDURE — 87635 SARS-COV-2 COVID-19 AMP PRB: CPT

## 2020-04-23 PROCEDURE — 99214 OFFICE O/P EST MOD 30 MIN: CPT | Performed by: INTERNAL MEDICINE

## 2020-04-23 RX ORDER — BENZONATATE 200 MG/1
200 CAPSULE ORAL 3 TIMES DAILY PRN
Qty: 30 CAPSULE | Refills: 0 | Status: SHIPPED | OUTPATIENT
Start: 2020-04-23 | End: 2020-10-07 | Stop reason: ALTCHOICE

## 2020-04-24 LAB — SARS-COV-2 RNA SPEC QL NAA+PROBE: NOT DETECTED

## 2020-04-25 DIAGNOSIS — E03.8 OTHER SPECIFIED HYPOTHYROIDISM: ICD-10-CM

## 2020-04-25 RX ORDER — LEVOTHYROXINE SODIUM 0.05 MG/1
TABLET ORAL
Qty: 30 TABLET | Refills: 0 | Status: SHIPPED | OUTPATIENT
Start: 2020-04-25 | End: 2020-06-16 | Stop reason: SDUPTHER

## 2020-04-27 ENCOUNTER — TELEPHONE (OUTPATIENT)
Dept: FAMILY MEDICINE CLINIC | Facility: CLINIC | Age: 65
End: 2020-04-27

## 2020-05-07 DIAGNOSIS — M79.7 FIBROMYALGIA: ICD-10-CM

## 2020-05-07 DIAGNOSIS — K21.9 GASTROESOPHAGEAL REFLUX DISEASE WITHOUT ESOPHAGITIS: ICD-10-CM

## 2020-05-07 DIAGNOSIS — F33.1 MODERATE EPISODE OF RECURRENT MAJOR DEPRESSIVE DISORDER (HCC): ICD-10-CM

## 2020-05-07 RX ORDER — DULOXETIN HYDROCHLORIDE 60 MG/1
60 CAPSULE, DELAYED RELEASE ORAL DAILY
Qty: 30 CAPSULE | Refills: 0 | Status: CANCELLED | OUTPATIENT
Start: 2020-05-07

## 2020-05-07 RX ORDER — OMEPRAZOLE 40 MG/1
40 CAPSULE, DELAYED RELEASE ORAL
Qty: 30 CAPSULE | Refills: 5 | Status: SHIPPED | OUTPATIENT
Start: 2020-05-07 | End: 2020-10-12

## 2020-05-07 RX ORDER — GABAPENTIN 300 MG/1
600 CAPSULE ORAL
Qty: 60 CAPSULE | Refills: 0 | Status: CANCELLED | OUTPATIENT
Start: 2020-05-07

## 2020-05-07 RX ORDER — DULOXETIN HYDROCHLORIDE 60 MG/1
60 CAPSULE, DELAYED RELEASE ORAL 2 TIMES DAILY
Qty: 180 CAPSULE | Refills: 0 | Status: CANCELLED | OUTPATIENT
Start: 2020-05-07

## 2020-06-16 DIAGNOSIS — E03.8 OTHER SPECIFIED HYPOTHYROIDISM: ICD-10-CM

## 2020-06-16 RX ORDER — LEVOTHYROXINE SODIUM 0.05 MG/1
50 TABLET ORAL DAILY
Qty: 30 TABLET | Refills: 2 | Status: SHIPPED | OUTPATIENT
Start: 2020-06-16 | End: 2020-10-09 | Stop reason: SDUPTHER

## 2020-07-06 ENCOUNTER — TELEMEDICINE (OUTPATIENT)
Dept: FAMILY MEDICINE CLINIC | Facility: CLINIC | Age: 65
End: 2020-07-06
Payer: COMMERCIAL

## 2020-07-06 DIAGNOSIS — J20.9 ACUTE BRONCHITIS, UNSPECIFIED ORGANISM: ICD-10-CM

## 2020-07-06 PROCEDURE — 99213 OFFICE O/P EST LOW 20 MIN: CPT | Performed by: NURSE PRACTITIONER

## 2020-07-06 RX ORDER — ALBUTEROL SULFATE 90 UG/1
2 AEROSOL, METERED RESPIRATORY (INHALATION) EVERY 4 HOURS PRN
Qty: 1 INHALER | Refills: 0 | Status: SHIPPED | OUTPATIENT
Start: 2020-07-06 | End: 2022-06-27

## 2020-07-06 RX ORDER — METHYLPREDNISOLONE 4 MG/1
TABLET ORAL
Qty: 1 EACH | Refills: 0 | Status: SHIPPED | OUTPATIENT
Start: 2020-07-06 | End: 2020-07-12

## 2020-07-06 RX ORDER — IPRATROPIUM BROMIDE AND ALBUTEROL SULFATE 2.5; .5 MG/3ML; MG/3ML
3 SOLUTION RESPIRATORY (INHALATION) 4 TIMES DAILY
Qty: 100 ML | Refills: 0 | Status: SHIPPED | OUTPATIENT
Start: 2020-07-06 | End: 2021-07-20 | Stop reason: HOSPADM

## 2020-07-06 NOTE — PROGRESS NOTES
Virtual Brief Visit    Assessment/Plan:    Problem List Items Addressed This Visit     None      Visit Diagnoses     Acute bronchitis, unspecified organism        Relevant Medications    ipratropium-albuterol (DUO-NEB) 0 5-2 5 mg/3 mL nebulizer solution    albuterol (PROVENTIL HFA,VENTOLIN HFA) 90 mcg/act inhaler    methylPREDNISolone 4 MG tablet therapy pack        Will resume nebulizer and albuterol inhaler, in addition to Medrol wendy  Recommended Robitussine DM OTC for her symptoms  Urgent care eval if her symptoms worsen or do not improve over the next 24-48 hours  Reason for visit is   Chief Complaint   Patient presents with    Virtual Brief Visit        Encounter provider SHRAVAN Doan    Provider located at [address]  [place] [address]  [address] [place] [address] 1  [place] [id number]    Recent Visits  No visits were found meeting these conditions  Showing recent visits within past 7 days and meeting all other requirements     Today's Visits  Date Type Provider Dept   07/06/20 Telemedicine Jerman Doan today's visits and meeting all other requirements     Future Appointments  No visits were found meeting these conditions  Showing future appointments within next 150 days and meeting all other requirements        After connecting through telephone, the patient was identified by name and date of birth  Shaggy Maynard was informed that this is a telemedicine visit and that the visit is being conducted through telephone  My office door was closed  No one else was in the room  She acknowledged consent and understanding of privacy and security of the platform  The patient has agreed to participate and understands she can discontinue the visit at any time  Patient is aware this is a billable service  Subjective    Shaggy Maynard is a 62 y o  female  Yesterday, she developed a cough and wheezing    She gets into coughing fits which cause her to vomit  She feels like her chest is draining and she has chest congestion  She does report chronic, dry cough, for which she uses an inhaler and nebulizer  This cough is painful  She reports mild SOB  She has not been feeling feverish, but has not checked her temperature  Has some nasal congestion and post nasal drip  She is not taking anything OTC for her symptoms          Past Medical History:   Diagnosis Date    Abdominal adhesions     Anesthesia complication     difficult to wake up    Anxiety     Depression     Depression     Disease of thyroid gland     Fibromyalgia     GERD (gastroesophageal reflux disease)     History of cholecystectomy 9/7/2019    Lazy eye     resolved: 3/27/17    Osteopenia     with joint pain-elevated DEV    Psychiatric disorder     Tinnitus     Wears glasses     for reading       Past Surgical History:   Procedure Laterality Date    ABDOMINAL SURGERY      lysis of adhesions x 2    APPENDECTOMY      CERVICAL FUSION      CHOLECYSTECTOMY      open    DILATION AND CURETTAGE OF UTERUS      NEUROMA EXCISION Right 5/26/2017    Procedure: EXCISION MASS / FIBROMA FOOT;  Surgeon: Noel Mchugh DPM;  Location: [facility];  Service:     RIGHT OOPHORECTOMY      WISDOM TOOTH EXTRACTION      x4       Current Outpatient Medications   Medication Sig Dispense Refill    albuterol (PROVENTIL HFA,VENTOLIN HFA) 90 mcg/act inhaler Inhale 2 puffs every 4 (four) hours as needed for wheezing 1 Inhaler 0    ALPHAGAN P 0 1 % 1 drop 2 (two) times a day       atorvastatin (LIPITOR) 20 mg tablet Take 20 mg by mouth daily   0    benzonatate (TESSALON) 200 MG capsule Take 1 capsule (200 mg total) by mouth 3 (three) times a day as needed for cough 30 capsule 0    calcium carbonate-vitamin D (OSCAL-D) 500 mg-200 units per tablet Take 1 tablet by mouth daily with breakfast 30 tablet 0    DULoxetine (CYMBALTA) 60 mg delayed release capsule Take 1 capsule (60 mg total) by mouth daily (Patient taking differently: Take 60 mg by mouth 2 (two) times a day ) 30 capsule 0    gabapentin (NEURONTIN) 300 mg capsule Take 2 capsules (600 mg total) by mouth daily at bedtime 60 capsule 0    ipratropium-albuterol (DUO-NEB) 0 5-2 5 mg/3 mL nebulizer solution Take 1 vial (3 mL total) by nebulization 4 (four) times a day 100 mL 0    levothyroxine 50 mcg tablet Take 1 tablet (50 mcg total) by mouth daily 30 tablet 2    meclizine (ANTIVERT) 25 mg tablet take 1 tablet by mouth every 6 hours if needed  IF DIZZINESS  0    methylPREDNISolone 4 MG tablet therapy pack Use as directed on package 1 each 0    midodrine (PROAMATINE) 10 MG tablet Take 1 tablet (10 mg total) by mouth 3 (three) times a day 90 tablet 0    omeprazole (PriLOSEC) 40 MG capsule Take 1 capsule (40 mg total) by mouth daily before breakfast 30 capsule 5     No current facility-administered medications for this visit  Allergies   Allergen Reactions    Demerol [Meperidine] Lightheadedness     Reaction Date: 11Jan2006;     Sulfa Antibiotics Hives     Reaction Date: 11Jan2006;        Review of Systems   Constitutional: Negative for chills, fatigue and fever  HENT: Positive for congestion and postnasal drip  Negative for ear pain, rhinorrhea, sinus pressure and sore throat  Respiratory: Positive for cough, shortness of breath and wheezing  Cardiovascular: Negative for chest pain  Gastrointestinal: Negative for abdominal pain, diarrhea, nausea and vomiting  Musculoskeletal: Negative for arthralgias  Skin: Negative for rash  Neurological: Negative for headaches  There were no vitals filed for this visit  As a result of this visit, I have not referred the patient for further respiratory evaluation  I spent 8 minutes directly with the patient during this visit    VIRTUAL VISIT DISCLAIMER    Adali Lucianojuan miguel acknowledges that she has consented to an online visit or consultation   She understands that the online visit is based solely on information provided by her, and that, in the absence of a face-to-face physical evaluation by the physician, the diagnosis she receives is both limited and provisional in terms of accuracy and completeness  This is not intended to replace a full medical face-to-face evaluation by the physician  Marshal Kiran understands and accepts these terms

## 2020-07-18 ENCOUNTER — HOSPITAL ENCOUNTER (EMERGENCY)
Facility: HOSPITAL | Age: 65
Discharge: HOME | End: 2020-07-18
Attending: EMERGENCY MEDICINE
Payer: COMMERCIAL

## 2020-07-18 ENCOUNTER — OFFICE VISIT (OUTPATIENT)
Dept: URGENT CARE | Facility: CLINIC | Age: 65
End: 2020-07-18
Payer: COMMERCIAL

## 2020-07-18 ENCOUNTER — APPOINTMENT (EMERGENCY)
Dept: RADIOLOGY | Facility: HOSPITAL | Age: 65
End: 2020-07-18
Payer: COMMERCIAL

## 2020-07-18 ENCOUNTER — APPOINTMENT (OUTPATIENT)
Dept: RADIOLOGY | Facility: CLINIC | Age: 65
End: 2020-07-18
Payer: COMMERCIAL

## 2020-07-18 VITALS
HEART RATE: 84 BPM | DIASTOLIC BLOOD PRESSURE: 84 MMHG | RESPIRATION RATE: 20 BRPM | TEMPERATURE: 97.7 F | OXYGEN SATURATION: 99 % | SYSTOLIC BLOOD PRESSURE: 155 MMHG

## 2020-07-18 VITALS
RESPIRATION RATE: 18 BRPM | DIASTOLIC BLOOD PRESSURE: 58 MMHG | TEMPERATURE: 97.1 F | SYSTOLIC BLOOD PRESSURE: 122 MMHG | WEIGHT: 168 LBS | OXYGEN SATURATION: 97 % | HEIGHT: 64 IN | BODY MASS INDEX: 28.68 KG/M2 | HEART RATE: 99 BPM

## 2020-07-18 DIAGNOSIS — N64.4 BREAST PAIN: ICD-10-CM

## 2020-07-18 DIAGNOSIS — N64.4 BREAST PAIN: Primary | ICD-10-CM

## 2020-07-18 PROCEDURE — 99283 EMERGENCY DEPT VISIT LOW MDM: CPT

## 2020-07-18 PROCEDURE — 3077F SYST BP >= 140 MM HG: CPT | Performed by: PREVENTIVE MEDICINE

## 2020-07-18 PROCEDURE — 93005 ELECTROCARDIOGRAM TRACING: CPT

## 2020-07-18 PROCEDURE — 3079F DIAST BP 80-89 MM HG: CPT | Performed by: PREVENTIVE MEDICINE

## 2020-07-18 PROCEDURE — 99213 OFFICE O/P EST LOW 20 MIN: CPT | Performed by: PREVENTIVE MEDICINE

## 2020-07-18 PROCEDURE — 99284 EMERGENCY DEPT VISIT MOD MDM: CPT | Performed by: EMERGENCY MEDICINE

## 2020-07-18 PROCEDURE — 1036F TOBACCO NON-USER: CPT | Performed by: PREVENTIVE MEDICINE

## 2020-07-18 PROCEDURE — 71046 X-RAY EXAM CHEST 2 VIEWS: CPT

## 2020-07-18 PROCEDURE — 3008F BODY MASS INDEX DOCD: CPT | Performed by: PREVENTIVE MEDICINE

## 2020-07-18 RX ORDER — CYCLOBENZAPRINE HCL 10 MG
10 TABLET ORAL 2 TIMES DAILY PRN
Qty: 10 TABLET | Refills: 0 | Status: SHIPPED | OUTPATIENT
Start: 2020-07-18 | End: 2020-10-07 | Stop reason: ALTCHOICE

## 2020-07-18 RX ORDER — NAPROXEN 500 MG/1
500 TABLET ORAL ONCE
Status: COMPLETED | OUTPATIENT
Start: 2020-07-18 | End: 2020-07-18

## 2020-07-18 RX ORDER — NAPROXEN 500 MG/1
500 TABLET ORAL 2 TIMES DAILY WITH MEALS
Qty: 10 TABLET | Refills: 0 | Status: SHIPPED | OUTPATIENT
Start: 2020-07-18 | End: 2020-10-09 | Stop reason: HOSPADM

## 2020-07-18 RX ORDER — CYCLOBENZAPRINE HCL 10 MG
10 TABLET ORAL ONCE
Status: COMPLETED | OUTPATIENT
Start: 2020-07-18 | End: 2020-07-18

## 2020-07-18 RX ORDER — BACLOFEN 10 MG/1
10 TABLET ORAL 3 TIMES DAILY
Qty: 21 TABLET | Refills: 0 | Status: SHIPPED | OUTPATIENT
Start: 2020-07-18 | End: 2020-10-07 | Stop reason: ALTCHOICE

## 2020-07-18 RX ADMIN — CYCLOBENZAPRINE HYDROCHLORIDE 10 MG: 10 TABLET, FILM COATED ORAL at 12:54

## 2020-07-18 RX ADMIN — NAPROXEN 500 MG: 500 TABLET ORAL at 12:54

## 2020-07-18 NOTE — PATIENT INSTRUCTIONS
Chest Pain   AMBULATORY CARE:   Chest pain  can be caused by a range of conditions, from not serious to life-threatening  It may be caused by a heart attack or a blood clot in your lungs  Sometimes chest pain or pressure is caused by poor blood flow to your heart (angina)  Infection, inflammation, or a fracture in the bones or cartilage in your chest can cause pain or discomfort  Chest pain can also be a symptom of a digestive problem, such as acid reflux or a stomach ulcer  An anxiety attack or a strong emotion such as anger can also cause chest pain  It is important to follow up with your healthcare provider to find the cause of your chest pain  Common symptoms you may have with chest pain:   · Fever or sweating     · Nausea or vomiting     · Shortness of breath     · Discomfort or pressure that spreads from your chest to your back, jaw, or arm     · A racing or slow heartbeat     · Feeling weak, tired, or faint  Call 911 if:   · You have any of the following signs of a heart attack:      ¨ Squeezing, pressure, or pain in your chest that lasts longer than 5 minutes or returns    ¨ Discomfort or pain in your back, neck, jaw, stomach, or arm     ¨ Trouble breathing    ¨ Nausea or vomiting    ¨ Lightheadedness or a sudden cold sweat, especially with chest pain or trouble breathing    Seek care immediately if:   · You have chest discomfort that gets worse, even with medicine  · You cough or vomit blood  · Your bowel movements are black or bloody  · You cannot stop vomiting, or it hurts to swallow  Contact your healthcare provider if:   · You have questions or concerns about your condition or care  Treatment for chest pain  may include medicine to treat your symptoms while your healthcare provider finds the cause of your chest pain  · Medicines  may be given to treat the cause of your chest pain  Examples include pain medicine, anxiety medicine, or medicines to increase blood flow to your heart  · Do not take certain medicines without asking your healthcare provider first   These include NSAIDs, herbal or vitamin supplements, or hormones (estrogen or progestin)  Follow up with your healthcare provider within 72 hours, or as directed: You may need to return for more tests to find the cause of your chest pain  You may be referred to a specialist, such as a cardiologist or gastroenterologist  Write down your questions so you remember to ask them during your visits  Healthy living tips: The following are general healthy guidelines  If your chest pain is caused by a heart problem, your healthcare provider will give you specific guidelines to follow  · Do not smoke  Nicotine and other chemicals in cigarettes and cigars can cause lung and heart damage  Ask your healthcare provider for information if you currently smoke and need help to quit  E-cigarettes or smokeless tobacco still contain nicotine  Talk to your healthcare provider before you use these products  · Eat a variety of healthy, low-fat foods  Healthy foods include fruits, vegetables, whole-grain breads, low-fat dairy products, beans, lean meats, and fish  Ask for more information about a heart healthy diet  · Ask about activity  Your healthcare provider will tell you which activities to limit or avoid  Ask when you can drive, return to work, and have sex  Ask about the best exercise plan for you  · Maintain a healthy weight  Ask your healthcare provider how much you should weigh  Ask him or her to help you create a weight loss plan if you are overweight  · Get the flu and pneumonia vaccines  All adults should get the influenza (flu) vaccine  Get it every year as soon as it becomes available  The pneumococcal vaccine is given to adults aged 72 years or older  The vaccine is given every 5 years to prevent pneumococcal disease, such as pneumonia    © 2017 [employer] Information is for End User's use only and may not be sold, redistributed or otherwise used for commercial purposes  All illustrations and images included in CareNotes® are the copyrighted property of A D A M , [employer]  or Satish Abdalla  The above information is an  only  It is not intended as medical advice for individual conditions or treatments  Talk to your doctor, nurse or pharmacist before following any medical regimen to see if it is safe and effective for you

## 2020-07-18 NOTE — ED PROVIDER NOTES
History  Chief Complaint   Patient presents with    Breast Problem     pt presents to the ed with bi lateral breast pain for several days  the pt was sent in by the care now      63 y/o female presents with bilateral breast pain for several days,no shortness of breath, nausea, vomiting, no pleurisy, no chest pressure no other symptoms  Patient denies lifting heavy things, no trauma, no rash no fevers or chills  patient went to an urgent care and was sent in for further evaluation  No cough  She had EKG,CXR done at the urgent care today, which was unremarkable  History provided by:  Patient   used: No        Prior to Admission Medications   Prescriptions Last Dose Informant Patient Reported? Taking?    ALPHAGAN P 0 1 %   Yes No   Si drop 2 (two) times a day    DULoxetine (CYMBALTA) 60 mg delayed release capsule   No No   Sig: Take 1 capsule (60 mg total) by mouth daily   Patient taking differently: Take 60 mg by mouth 2 (two) times a day    albuterol (PROVENTIL HFA,VENTOLIN HFA) 90 mcg/act inhaler   No No   Sig: Inhale 2 puffs every 4 (four) hours as needed for wheezing   atorvastatin (LIPITOR) 20 mg tablet   Yes No   Sig: Take 20 mg by mouth daily    baclofen 10 mg tablet   No No   Sig: Take 1 tablet (10 mg total) by mouth 3 (three) times a day   benzonatate (TESSALON) 200 MG capsule   No No   Sig: Take 1 capsule (200 mg total) by mouth 3 (three) times a day as needed for cough   calcium carbonate-vitamin D (OSCAL-D) 500 mg-200 units per tablet   No No   Sig: Take 1 tablet by mouth daily with breakfast   gabapentin (NEURONTIN) 300 mg capsule   No No   Sig: Take 2 capsules (600 mg total) by mouth daily at bedtime   ipratropium-albuterol (DUO-NEB) 0 5-2 5 mg/3 mL nebulizer solution   No No   Sig: Take 1 vial (3 mL total) by nebulization 4 (four) times a day   levothyroxine 50 mcg tablet   No No   Sig: Take 1 tablet (50 mcg total) by mouth daily   meclizine (ANTIVERT) 25 mg tablet   Yes No Sig: take 1 tablet by mouth every 6 hours if needed  IF DIZZINESS   midodrine (PROAMATINE) 10 MG tablet   No No   Sig: Take 1 tablet (10 mg total) by mouth 3 (three) times a day   omeprazole (PriLOSEC) 40 MG capsule   No No   Sig: Take 1 capsule (40 mg total) by mouth daily before breakfast      Facility-Administered Medications: None       Past Medical History:   Diagnosis Date    Abdominal adhesions     Anesthesia complication     difficult to wake up    Anxiety     Depression     Depression     Disease of thyroid gland     Fibromyalgia     GERD (gastroesophageal reflux disease)     History of cholecystectomy 9/7/2019    Lazy eye     resolved: 3/27/17    Osteopenia     with joint pain-elevated DEV    Psychiatric disorder     Tinnitus     Wears glasses     for reading       Past Surgical History:   Procedure Laterality Date    ABDOMINAL SURGERY      lysis of adhesions x 2    APPENDECTOMY      CERVICAL FUSION      CHOLECYSTECTOMY      open    DILATION AND CURETTAGE OF UTERUS      NEUROMA EXCISION Right 5/26/2017    Procedure: EXCISION MASS / FIBROMA FOOT;  Surgeon: Bradly Cockayne, DPM;  Location[facility] [place];  Service:     RIGHT OOPHORECTOMY      WISDOM TOOTH EXTRACTION      x4       Family History   Problem Relation Age of Onset    Heart disease Mother     Stroke Mother 58    COPD Mother     Arthritis Mother     Hypertension Father     Kidney disease Brother         kidney transplant    No Known Problems Sister     No Known Problems Maternal Aunt     No Known Problems Maternal Uncle     No Known Problems Paternal Aunt     No Known Problems Paternal Uncle     No Known Problems Maternal Grandmother     No Known Problems Maternal Grandfather     No Known Problems Paternal Grandmother     No Known Problems Paternal Grandfather     ADD / ADHD Neg Hx     Anesthesia problems Neg Hx     Cancer Neg Hx     Clotting disorder Neg Hx     Collagen disease Neg Hx     Diabetes Neg Hx     Dislocations Neg Hx     Learning disabilities Neg Hx     Neurological problems Neg Hx     Osteoporosis Neg Hx     Rheumatologic disease Neg Hx     Scoliosis Neg Hx     Vascular Disease Neg Hx      I have reviewed and agree with the history as documented  E-Cigarette/Vaping    E-Cigarette Use Never User      E-Cigarette/Vaping Substances    Nicotine No     THC No     CBD No     Flavoring No     Other No     Unknown No      Social History     Tobacco Use    Smoking status: Never Smoker    Smokeless tobacco: Never Used   Substance Use Topics    Alcohol use: Not Currently     Frequency: Never     Binge frequency: Never    Drug use: Never       Review of Systems   Constitutional: Negative  HENT: Negative  Eyes: Negative  Respiratory: Negative  Cardiovascular: Negative  Gastrointestinal: Negative  Endocrine: Negative  Genitourinary: Negative  Musculoskeletal: Negative  Skin: Negative  Breast tenderness   Allergic/Immunologic: Negative  Neurological: Negative  Hematological: Negative  Psychiatric/Behavioral: Negative  All other systems reviewed and are negative  Physical Exam  Physical Exam   Constitutional: She is oriented to person, place, and time  She appears well-developed and well-nourished  HENT:   Head: Normocephalic and atraumatic  Eyes: Pupils are equal, round, and reactive to light  EOM are normal    Neck: Normal range of motion  Neck supple  Cardiovascular: Normal rate and regular rhythm  Breast tissue tenderness noted, no abscess, rash noted  Tenderness along the pectoralis minor muscle, tenderness upon palpation  Pulmonary/Chest: Effort normal and breath sounds normal    Abdominal: Soft  Bowel sounds are normal    Musculoskeletal: Normal range of motion  Neurological: She is alert and oriented to person, place, and time  Skin: Skin is warm and dry  Psychiatric: She has a normal mood and affect     Nursing note and vitals reviewed        Vital Signs  ED Triage Vitals [07/18/20 1201]   Temperature Pulse Respirations Blood Pressure SpO2   97 7 °F (36 5 °C) 84 20 155/84 99 %      Temp Source Heart Rate Source Patient Position - Orthostatic VS BP Location FiO2 (%)   Tympanic Monitor -- -- --      Pain Score       --           Vitals:    07/18/20 1201   BP: 155/84   Pulse: 84         Visual Acuity      ED Medications  Medications   cyclobenzaprine (FLEXERIL) tablet 10 mg (10 mg Oral Given 7/18/20 1254)   naproxen (NAPROSYN) tablet 500 mg (500 mg Oral Given 7/18/20 1254)       Diagnostic Studies  Results Reviewed     None                 No orders to display              Procedures  ECG 12 Lead Documentation Only  Performed by: Alireza Arias DO  Authorized by: Alireza Arias DO     ECG reviewed by me, the ED Provider: yes    Patient location:  ED  Previous ECG:     Previous ECG:  Unavailable    Comparison to cardiac monitor: Yes    Interpretation:     Interpretation: non-specific    Rate:     ECG rate assessment: normal    Rhythm:     Rhythm: sinus rhythm    Ectopy:     Ectopy: none    QRS:     QRS axis:  Normal  Conduction:     Conduction: normal    ST segments:     ST segments:  Non-specific  T waves:     T waves: non-specific               ED Course                                             MDM  Number of Diagnoses or Management Options  Breast pain:      Amount and/or Complexity of Data Reviewed  Tests in the radiology section of CPT®: reviewed  Tests in the medicine section of CPT®: reviewed    Patient Progress  Patient progress: stable        Disposition  Final diagnoses:   Breast pain     Time reflects when diagnosis was documented in both MDM as applicable and the Disposition within this note     Time User Action Codes Description Comment    7/18/2020  1:11 PM Fox Scanlon Add [N64 4] Breast pain       ED Disposition     ED Disposition Condition Date/Time Comment    Discharge Stable Sat Jul 18, 2020  1:11 PM Scott Diaz discharge to home/self care  Follow-up Information     Follow up With Specialties Details Why Contact Info Additional Information    Rolanda Liriano MD Internal Medicine, Family Medicine Schedule an appointment as soon as possible for a visit   [address]    [address] [facility] [address]  [address] [facility] Emergency Department Emergency Medicine  If symptoms worsen [address] [address]  [phone] [facility] ED, [place], [place], [address]          Discharge Medication List as of 7/18/2020  1:16 PM      START taking these medications    Details   cyclobenzaprine (FLEXERIL) 10 mg tablet Take 1 tablet (10 mg total) by mouth 2 (two) times a day as needed for muscle spasms for up to 5 days, Starting Sat 7/18/2020, Until Thu 7/23/2020, Print      naproxen (NAPROSYN) 500 mg tablet Take 1 tablet (500 mg total) by mouth 2 (two) times a day with meals for 5 days, Starting Sat 7/18/2020, Until Thu 7/23/2020, Print         CONTINUE these medications which have NOT CHANGED    Details   albuterol (PROVENTIL HFA,VENTOLIN HFA) 90 mcg/act inhaler Inhale 2 puffs every 4 (four) hours as needed for wheezing, Starting Mon 7/6/2020, Normal      ALPHAGAN P 0 1 % 1 drop 2 (two) times a day , Starting Wed 1/29/2020, Historical Med      atorvastatin (LIPITOR) 20 mg tablet Take 20 mg by mouth daily , Starting Thu 11/21/2019, Historical Med      baclofen 10 mg tablet Take 1 tablet (10 mg total) by mouth 3 (three) times a day, Starting Sat 7/18/2020, Normal      benzonatate (TESSALON) 200 MG capsule Take 1 capsule (200 mg total) by mouth 3 (three) times a day as needed for cough, Starting Thu 4/23/2020, Normal      calcium carbonate-vitamin D (OSCAL-D) 500 mg-200 units per tablet Take 1 tablet by mouth daily with breakfast, Starting Thu 10/3/2019, Print      DULoxetine (CYMBALTA) 60 mg delayed release capsule Take 1 capsule (60 mg total) by mouth daily, Starting Thu 10/3/2019, Print      gabapentin (NEURONTIN) 300 mg capsule Take 2 capsules (600 mg total) by mouth daily at bedtime, Starting Thu 10/3/2019, Print      ipratropium-albuterol (DUO-NEB) 0 5-2 5 mg/3 mL nebulizer solution Take 1 vial (3 mL total) by nebulization 4 (four) times a day, Starting Mon 7/6/2020, Normal      levothyroxine 50 mcg tablet Take 1 tablet (50 mcg total) by mouth daily, Starting Tue 6/16/2020, Normal      meclizine (ANTIVERT) 25 mg tablet take 1 tablet by mouth every 6 hours if needed  IF DIZZINESS, Historical Med      midodrine (PROAMATINE) 10 MG tablet Take 1 tablet (10 mg total) by mouth 3 (three) times a day, Starting Thu 3/5/2020, Normal      omeprazole (PriLOSEC) 40 MG capsule Take 1 capsule (40 mg total) by mouth daily before breakfast, Starting Thu 5/7/2020, Normal           No discharge procedures on file      PDMP Review     None          ED Provider  Electronically Signed by           Tuyet Carbone DO  07/20/20 [id number]

## 2020-07-18 NOTE — DISCHARGE INSTRUCTIONS
Please follow up with your doctor in a week  Take the medications as prescribed  Take naprosyn twice a day with food  Also flexeril twice a day as it makes you drowsy, do not mix with alcohol and other sedatives  Apply ice on it and warm compresses on the area  Do not lift heavy things

## 2020-07-20 LAB
ATRIAL RATE: 80 BPM
ATRIAL RATE: 88 BPM
P AXIS: 49 DEGREES
P AXIS: 53 DEGREES
PR INTERVAL: 158 MS
PR INTERVAL: 164 MS
QRS AXIS: -16 DEGREES
QRS AXIS: -18 DEGREES
QRSD INTERVAL: 80 MS
QRSD INTERVAL: 86 MS
QT INTERVAL: 386 MS
QT INTERVAL: 406 MS
QTC INTERVAL: 467 MS
QTC INTERVAL: 468 MS
T WAVE AXIS: 14 DEGREES
T WAVE AXIS: 8 DEGREES
VENTRICULAR RATE: 80 BPM
VENTRICULAR RATE: 88 BPM

## 2020-07-20 PROCEDURE — 93010 ELECTROCARDIOGRAM REPORT: CPT | Performed by: PREVENTIVE MEDICINE

## 2020-07-20 PROCEDURE — 93010 ELECTROCARDIOGRAM REPORT: CPT | Performed by: INTERNAL MEDICINE

## 2020-07-24 NOTE — PROGRESS NOTES
[address] [employer] [facility]        NAME: Huyen Dejesus is a 62 y o  female  : 1955    MRN: [id number]  DATE: 2020  TIME: 10:18 AM    Assessment and Plan   Breast pain [N64 4]  1  Breast pain  XR chest pa & lateral    baclofen 10 mg tablet    Transfer to other facility         Patient Instructions       Follow up with PCP in 3-5 days  Proceed to  ER if symptoms worsen  Chief Complaint     Chief Complaint   Patient presents with    Breast Pain     Pt reports of bilateral upper breast pain started approx 2 days ago  Pt denies any injuries  History of Present Illness       Chest Pain    This is a new problem  The current episode started in the past 7 days  The onset quality is sudden  The problem occurs constantly  The problem has been unchanged  The pain is at a severity of 8/10  The pain is severe  The quality of the pain is described as stabbing and squeezing  The pain radiates to the left shoulder and right shoulder  Pertinent negatives include no cough, fever or shortness of breath  The pain is aggravated by nothing  She has tried nothing for the symptoms  The treatment provided no relief  Review of Systems   Review of Systems   Constitutional: Negative for fever  Respiratory: Negative for cough and shortness of breath  Cardiovascular: Positive for chest pain  All other systems reviewed and are negative          Current Medications       Current Outpatient Medications:     albuterol (PROVENTIL HFA,VENTOLIN HFA) 90 mcg/act inhaler, Inhale 2 puffs every 4 (four) hours as needed for wheezing, Disp: 1 Inhaler, Rfl: 0    ALPHAGAN P 0 1 %, 1 drop 2 (two) times a day , Disp: , Rfl:     atorvastatin (LIPITOR) 20 mg tablet, Take 20 mg by mouth daily , Disp: , Rfl: 0    baclofen 10 mg tablet, Take 1 tablet (10 mg total) by mouth 3 (three) times a day, Disp: 21 tablet, Rfl: 0    benzonatate (TESSALON) 200 MG capsule, Take 1 capsule (200 mg total) by mouth 3 (three) times a day as needed for cough, Disp: 30 capsule, Rfl: 0    calcium carbonate-vitamin D (OSCAL-D) 500 mg-200 units per tablet, Take 1 tablet by mouth daily with breakfast, Disp: 30 tablet, Rfl: 0    cyclobenzaprine (FLEXERIL) 10 mg tablet, Take 1 tablet (10 mg total) by mouth 2 (two) times a day as needed for muscle spasms for up to 5 days, Disp: 10 tablet, Rfl: 0    DULoxetine (CYMBALTA) 60 mg delayed release capsule, Take 1 capsule (60 mg total) by mouth daily (Patient taking differently: Take 60 mg by mouth 2 (two) times a day ), Disp: 30 capsule, Rfl: 0    gabapentin (NEURONTIN) 300 mg capsule, Take 2 capsules (600 mg total) by mouth daily at bedtime, Disp: 60 capsule, Rfl: 0    ipratropium-albuterol (DUO-NEB) 0 5-2 5 mg/3 mL nebulizer solution, Take 1 vial (3 mL total) by nebulization 4 (four) times a day, Disp: 100 mL, Rfl: 0    levothyroxine 50 mcg tablet, Take 1 tablet (50 mcg total) by mouth daily, Disp: 30 tablet, Rfl: 2    meclizine (ANTIVERT) 25 mg tablet, take 1 tablet by mouth every 6 hours if needed  IF DIZZINESS, Disp: , Rfl: 0    midodrine (PROAMATINE) 10 MG tablet, Take 1 tablet (10 mg total) by mouth 3 (three) times a day, Disp: 90 tablet, Rfl: 0    naproxen (NAPROSYN) 500 mg tablet, Take 1 tablet (500 mg total) by mouth 2 (two) times a day with meals for 5 days, Disp: 10 tablet, Rfl: 0    omeprazole (PriLOSEC) 40 MG capsule, Take 1 capsule (40 mg total) by mouth daily before breakfast, Disp: 30 capsule, Rfl: 5    Current Allergies     Allergies as of 07/18/2020 - Reviewed 07/18/2020   Allergen Reaction Noted    Demerol [meperidine] Lightheadedness 01/11/2006    Sulfa antibiotics Hives 01/11/2006            The following portions of the patient's history were reviewed and updated as appropriate: allergies, current medications, past family history, past medical history, past social history, past surgical history and problem list      Past Medical History:   Diagnosis Date    Abdominal adhesions     Anesthesia complication     difficult to wake up    Anxiety     Depression     Depression     Disease of thyroid gland     Fibromyalgia     GERD (gastroesophageal reflux disease)     History of cholecystectomy 9/7/2019    Lazy eye     resolved: 3/27/17    Osteopenia     with joint pain-elevated DEV    Psychiatric disorder     Tinnitus     Wears glasses     for reading       Past Surgical History:   Procedure Laterality Date    ABDOMINAL SURGERY      lysis of adhesions x 2    APPENDECTOMY      CERVICAL FUSION      CHOLECYSTECTOMY      open    DILATION AND CURETTAGE OF UTERUS      NEUROMA EXCISION Right 5/26/2017    Procedure: EXCISION MASS / FIBROMA FOOT;  Surgeon: Isa Allen DPM;  Location: [facility];  Service:     RIGHT OOPHORECTOMY      WISDOM TOOTH EXTRACTION      x4       Family History   Problem Relation Age of Onset    Heart disease Mother     Stroke Mother 58    COPD Mother     Arthritis Mother     Hypertension Father     Kidney disease Brother         kidney transplant    No Known Problems Sister     No Known Problems Maternal Aunt     No Known Problems Maternal Uncle     No Known Problems Paternal Aunt     No Known Problems Paternal Uncle     No Known Problems Maternal Grandmother     No Known Problems Maternal Grandfather     No Known Problems Paternal Grandmother     No Known Problems Paternal Grandfather     ADD / ADHD Neg Hx     Anesthesia problems Neg Hx     Cancer Neg Hx     Clotting disorder Neg Hx     Collagen disease Neg Hx     Diabetes Neg Hx     Dislocations Neg Hx     Learning disabilities Neg Hx     Neurological problems Neg Hx     Osteoporosis Neg Hx     Rheumatologic disease Neg Hx     Scoliosis Neg Hx     Vascular Disease Neg Hx          Medications have been verified          Objective   /58   Pulse 99   Temp (!) 97 1 °F (36 2 °C)   Resp 18   Ht 5' 4" (1 626 m)   Wt 76 2 kg (168 lb)   LMP  (LMP Unknown)   SpO2 97%   BMI 28 84 kg/m²        Physical Exam     Physical Exam   Constitutional: She appears well-developed and well-nourished  Cardiovascular: Normal rate and regular rhythm  Pulmonary/Chest: Effort normal and breath sounds normal    Musculoskeletal:        Arms:  Nursing note and vitals reviewed

## 2020-08-03 RX ORDER — QUETIAPINE FUMARATE 200 MG/1
TABLET, FILM COATED ORAL
Qty: 30 TABLET | OUTPATIENT
Start: 2020-08-03

## 2020-08-03 RX ORDER — TRAZODONE HYDROCHLORIDE 150 MG/1
TABLET ORAL
Qty: 30 TABLET | OUTPATIENT
Start: 2020-08-03

## 2020-08-06 ENCOUNTER — TRANSCRIBE ORDERS (OUTPATIENT)
Dept: ADMINISTRATIVE | Facility: HOSPITAL | Age: 65
End: 2020-08-06

## 2020-08-06 ENCOUNTER — HOSPITAL ENCOUNTER (OUTPATIENT)
Dept: RADIOLOGY | Facility: HOSPITAL | Age: 65
Discharge: HOME | End: 2020-08-06
Attending: INTERNAL MEDICINE
Payer: COMMERCIAL

## 2020-08-06 VITALS — BODY MASS INDEX: 28.68 KG/M2 | HEIGHT: 64 IN | WEIGHT: 168 LBS

## 2020-08-06 DIAGNOSIS — Z12.39 SCREENING BREAST EXAMINATION: ICD-10-CM

## 2020-08-06 DIAGNOSIS — F33.41 MAJOR DEPRESSIVE DISORDER, RECURRENT, IN PARTIAL REMISSION (HCC): Primary | ICD-10-CM

## 2020-08-06 PROCEDURE — 77067 SCR MAMMO BI INCL CAD: CPT

## 2020-08-06 PROCEDURE — 77063 BREAST TOMOSYNTHESIS BI: CPT

## 2020-08-06 RX ORDER — TRAZODONE HYDROCHLORIDE 150 MG/1
150 TABLET ORAL
COMMUNITY
End: 2020-08-06 | Stop reason: SDUPTHER

## 2020-08-06 RX ORDER — TRAZODONE HYDROCHLORIDE 150 MG/1
150 TABLET ORAL
Qty: 90 TABLET | Refills: 0 | Status: SHIPPED | OUTPATIENT
Start: 2020-08-06 | End: 2020-10-05 | Stop reason: SDUPTHER

## 2020-08-06 RX ORDER — QUETIAPINE FUMARATE 200 MG/1
200 TABLET, FILM COATED ORAL
Qty: 90 TABLET | Refills: 0 | Status: SHIPPED | OUTPATIENT
Start: 2020-08-06 | End: 2020-10-05 | Stop reason: DRUGHIGH

## 2020-08-06 RX ORDER — QUETIAPINE FUMARATE 200 MG/1
200 TABLET, FILM COATED ORAL
COMMUNITY
End: 2020-08-06 | Stop reason: SDUPTHER

## 2020-10-05 ENCOUNTER — TELEMEDICINE (OUTPATIENT)
Dept: PSYCHIATRY | Facility: CLINIC | Age: 65
End: 2020-10-05
Payer: COMMERCIAL

## 2020-10-05 DIAGNOSIS — F41.1 GENERALIZED ANXIETY DISORDER: ICD-10-CM

## 2020-10-05 DIAGNOSIS — F33.41 MAJOR DEPRESSIVE DISORDER, RECURRENT, IN PARTIAL REMISSION (HCC): Primary | ICD-10-CM

## 2020-10-05 DIAGNOSIS — F33.1 MODERATE EPISODE OF RECURRENT MAJOR DEPRESSIVE DISORDER (HCC): ICD-10-CM

## 2020-10-05 DIAGNOSIS — F63.0 GAMBLING DISORDER, MODERATE: ICD-10-CM

## 2020-10-05 DIAGNOSIS — M79.7 FIBROMYALGIA: ICD-10-CM

## 2020-10-05 PROCEDURE — 99214 OFFICE O/P EST MOD 30 MIN: CPT | Performed by: NURSE PRACTITIONER

## 2020-10-05 RX ORDER — TRAZODONE HYDROCHLORIDE 150 MG/1
150 TABLET ORAL
Qty: 30 TABLET | Refills: 2 | Status: ON HOLD | OUTPATIENT
Start: 2020-10-05 | End: 2020-10-26 | Stop reason: SDUPTHER

## 2020-10-05 RX ORDER — GABAPENTIN 300 MG/1
600 CAPSULE ORAL 2 TIMES DAILY
Qty: 120 CAPSULE | Refills: 2 | Status: SHIPPED | OUTPATIENT
Start: 2020-10-05 | End: 2021-01-25 | Stop reason: SDUPTHER

## 2020-10-05 RX ORDER — QUETIAPINE FUMARATE 100 MG/1
150 TABLET, FILM COATED ORAL
Qty: 45 TABLET | Refills: 2 | Status: ON HOLD | OUTPATIENT
Start: 2020-10-05 | End: 2020-10-26 | Stop reason: SDUPTHER

## 2020-10-05 RX ORDER — DULOXETIN HYDROCHLORIDE 60 MG/1
60 CAPSULE, DELAYED RELEASE ORAL 2 TIMES DAILY
Qty: 60 CAPSULE | Refills: 2 | Status: SHIPPED | OUTPATIENT
Start: 2020-10-05 | End: 2021-01-25 | Stop reason: SDUPTHER

## 2020-10-07 ENCOUNTER — OFFICE VISIT (OUTPATIENT)
Dept: FAMILY MEDICINE CLINIC | Facility: CLINIC | Age: 65
End: 2020-10-07
Payer: COMMERCIAL

## 2020-10-07 VITALS
SYSTOLIC BLOOD PRESSURE: 110 MMHG | TEMPERATURE: 97.8 F | BODY MASS INDEX: 29.19 KG/M2 | RESPIRATION RATE: 16 BRPM | HEART RATE: 72 BPM | HEIGHT: 64 IN | WEIGHT: 171 LBS | DIASTOLIC BLOOD PRESSURE: 64 MMHG

## 2020-10-07 DIAGNOSIS — R10.2 PELVIC PAIN: ICD-10-CM

## 2020-10-07 DIAGNOSIS — Z23 NEED FOR VACCINATION: ICD-10-CM

## 2020-10-07 DIAGNOSIS — Z00.00 WELL ADULT EXAM: Primary | ICD-10-CM

## 2020-10-07 DIAGNOSIS — E78.2 MIXED DYSLIPIDEMIA: ICD-10-CM

## 2020-10-07 DIAGNOSIS — R73.09 ELEVATED GLUCOSE: ICD-10-CM

## 2020-10-07 DIAGNOSIS — E03.9 ADULT HYPOTHYROIDISM: ICD-10-CM

## 2020-10-07 PROCEDURE — 90750 HZV VACC RECOMBINANT IM: CPT

## 2020-10-07 PROCEDURE — 99396 PREV VISIT EST AGE 40-64: CPT | Performed by: INTERNAL MEDICINE

## 2020-10-07 PROCEDURE — 90471 IMMUNIZATION ADMIN: CPT

## 2020-10-07 PROCEDURE — 90682 RIV4 VACC RECOMBINANT DNA IM: CPT

## 2020-10-07 PROCEDURE — 90472 IMMUNIZATION ADMIN EACH ADD: CPT

## 2020-10-08 ENCOUNTER — APPOINTMENT (EMERGENCY)
Dept: CT IMAGING | Facility: HOSPITAL | Age: 65
End: 2020-10-08
Payer: COMMERCIAL

## 2020-10-08 ENCOUNTER — HOSPITAL ENCOUNTER (OUTPATIENT)
Facility: HOSPITAL | Age: 65
Setting detail: OBSERVATION
Discharge: HOME | End: 2020-10-09
Attending: EMERGENCY MEDICINE | Admitting: INTERNAL MEDICINE
Payer: COMMERCIAL

## 2020-10-08 DIAGNOSIS — R10.13 EPIGASTRIC PAIN: ICD-10-CM

## 2020-10-08 DIAGNOSIS — R07.9 CHEST PAIN: Primary | ICD-10-CM

## 2020-10-08 DIAGNOSIS — K21.9 GASTROESOPHAGEAL REFLUX DISEASE WITHOUT ESOPHAGITIS: ICD-10-CM

## 2020-10-08 LAB
ALBUMIN SERPL BCP-MCNC: 3.6 G/DL (ref 3.5–5)
ALP SERPL-CCNC: 74 U/L (ref 46–116)
ALT SERPL W P-5'-P-CCNC: 23 U/L (ref 12–78)
ANION GAP SERPL CALCULATED.3IONS-SCNC: 5 MMOL/L (ref 4–13)
APTT PPP: 28 SECONDS (ref 23–37)
AST SERPL W P-5'-P-CCNC: 21 U/L (ref 5–45)
BASOPHILS # BLD AUTO: 0.02 THOUSANDS/ΜL (ref 0–0.1)
BASOPHILS NFR BLD AUTO: 0 % (ref 0–1)
BILIRUB SERPL-MCNC: 0.4 MG/DL (ref 0.2–1)
BUN SERPL-MCNC: 12 MG/DL (ref 5–25)
CALCIUM SERPL-MCNC: 9.1 MG/DL (ref 8.3–10.1)
CHLORIDE SERPL-SCNC: 106 MMOL/L (ref 100–108)
CO2 SERPL-SCNC: 29 MMOL/L (ref 21–32)
CREAT SERPL-MCNC: 1.07 MG/DL (ref 0.6–1.3)
EOSINOPHIL # BLD AUTO: 0.08 THOUSAND/ΜL (ref 0–0.61)
EOSINOPHIL NFR BLD AUTO: 1 % (ref 0–6)
ERYTHROCYTE [DISTWIDTH] IN BLOOD BY AUTOMATED COUNT: 13.2 % (ref 11.6–15.1)
GFR SERPL CREATININE-BSD FRML MDRD: 55 ML/MIN/1.73SQ M
GLUCOSE SERPL-MCNC: 96 MG/DL (ref 65–140)
HCT VFR BLD AUTO: 36.8 % (ref 34.8–46.1)
HGB BLD-MCNC: 11.7 G/DL (ref 11.5–15.4)
IMM GRANULOCYTES # BLD AUTO: 0.01 THOUSAND/UL (ref 0–0.2)
IMM GRANULOCYTES NFR BLD AUTO: 0 % (ref 0–2)
INR PPP: 1.09 (ref 0.84–1.19)
LIPASE SERPL-CCNC: 59 U/L (ref 73–393)
LYMPHOCYTES # BLD AUTO: 1.32 THOUSANDS/ΜL (ref 0.6–4.47)
LYMPHOCYTES NFR BLD AUTO: 24 % (ref 14–44)
MCH RBC QN AUTO: 28 PG (ref 26.8–34.3)
MCHC RBC AUTO-ENTMCNC: 31.8 G/DL (ref 31.4–37.4)
MCV RBC AUTO: 88 FL (ref 82–98)
MONOCYTES # BLD AUTO: 0.7 THOUSAND/ΜL (ref 0.17–1.22)
MONOCYTES NFR BLD AUTO: 13 % (ref 4–12)
NEUTROPHILS # BLD AUTO: 3.46 THOUSANDS/ΜL (ref 1.85–7.62)
NEUTS SEG NFR BLD AUTO: 62 % (ref 43–75)
NRBC BLD AUTO-RTO: 0 /100 WBCS
PLATELET # BLD AUTO: 186 THOUSANDS/UL (ref 149–390)
PMV BLD AUTO: 10.7 FL (ref 8.9–12.7)
POTASSIUM SERPL-SCNC: 4.4 MMOL/L (ref 3.5–5.3)
PROT SERPL-MCNC: 7.1 G/DL (ref 6.4–8.2)
PROTHROMBIN TIME: 14.2 SECONDS (ref 11.6–14.5)
RBC # BLD AUTO: 4.18 MILLION/UL (ref 3.81–5.12)
SODIUM SERPL-SCNC: 140 MMOL/L (ref 136–145)
TROPONIN I SERPL-MCNC: <0.02 NG/ML
WBC # BLD AUTO: 5.59 THOUSAND/UL (ref 4.31–10.16)

## 2020-10-08 PROCEDURE — 85610 PROTHROMBIN TIME: CPT | Performed by: EMERGENCY MEDICINE

## 2020-10-08 PROCEDURE — 96374 THER/PROPH/DIAG INJ IV PUSH: CPT

## 2020-10-08 PROCEDURE — 93005 ELECTROCARDIOGRAM TRACING: CPT

## 2020-10-08 PROCEDURE — G1004 CDSM NDSC: HCPCS

## 2020-10-08 PROCEDURE — 85730 THROMBOPLASTIN TIME PARTIAL: CPT | Performed by: EMERGENCY MEDICINE

## 2020-10-08 PROCEDURE — 83690 ASSAY OF LIPASE: CPT | Performed by: EMERGENCY MEDICINE

## 2020-10-08 PROCEDURE — C9113 INJ PANTOPRAZOLE SODIUM, VIA: HCPCS | Performed by: EMERGENCY MEDICINE

## 2020-10-08 PROCEDURE — 74177 CT ABD & PELVIS W/CONTRAST: CPT

## 2020-10-08 PROCEDURE — 96375 TX/PRO/DX INJ NEW DRUG ADDON: CPT

## 2020-10-08 PROCEDURE — 84484 ASSAY OF TROPONIN QUANT: CPT | Performed by: EMERGENCY MEDICINE

## 2020-10-08 PROCEDURE — 99285 EMERGENCY DEPT VISIT HI MDM: CPT | Performed by: EMERGENCY MEDICINE

## 2020-10-08 PROCEDURE — 99285 EMERGENCY DEPT VISIT HI MDM: CPT

## 2020-10-08 PROCEDURE — 99220 PR INITIAL OBSERVATION CARE/DAY 70 MINUTES: CPT | Performed by: PHYSICIAN ASSISTANT

## 2020-10-08 PROCEDURE — 36415 COLL VENOUS BLD VENIPUNCTURE: CPT | Performed by: EMERGENCY MEDICINE

## 2020-10-08 PROCEDURE — 85025 COMPLETE CBC W/AUTO DIFF WBC: CPT | Performed by: EMERGENCY MEDICINE

## 2020-10-08 PROCEDURE — 80053 COMPREHEN METABOLIC PANEL: CPT | Performed by: EMERGENCY MEDICINE

## 2020-10-08 RX ORDER — MIDODRINE HYDROCHLORIDE 5 MG/1
10 TABLET ORAL
Status: DISCONTINUED | OUTPATIENT
Start: 2020-10-09 | End: 2020-10-09 | Stop reason: HOSPADM

## 2020-10-08 RX ORDER — GABAPENTIN 300 MG/1
600 CAPSULE ORAL 2 TIMES DAILY
Status: DISCONTINUED | OUTPATIENT
Start: 2020-10-09 | End: 2020-10-09 | Stop reason: HOSPADM

## 2020-10-08 RX ORDER — LIDOCAINE HYDROCHLORIDE 20 MG/ML
10 SOLUTION OROPHARYNGEAL ONCE
Status: COMPLETED | OUTPATIENT
Start: 2020-10-08 | End: 2020-10-08

## 2020-10-08 RX ORDER — MAGNESIUM HYDROXIDE/ALUMINUM HYDROXICE/SIMETHICONE 120; 1200; 1200 MG/30ML; MG/30ML; MG/30ML
30 SUSPENSION ORAL EVERY 4 HOURS PRN
Status: DISCONTINUED | OUTPATIENT
Start: 2020-10-08 | End: 2020-10-09 | Stop reason: HOSPADM

## 2020-10-08 RX ORDER — ONDANSETRON 2 MG/ML
4 INJECTION INTRAMUSCULAR; INTRAVENOUS ONCE
Status: COMPLETED | OUTPATIENT
Start: 2020-10-08 | End: 2020-10-08

## 2020-10-08 RX ORDER — SENNOSIDES 8.6 MG
1 TABLET ORAL
Status: DISCONTINUED | OUTPATIENT
Start: 2020-10-08 | End: 2020-10-09 | Stop reason: HOSPADM

## 2020-10-08 RX ORDER — ONDANSETRON 2 MG/ML
4 INJECTION INTRAMUSCULAR; INTRAVENOUS EVERY 6 HOURS PRN
Status: DISCONTINUED | OUTPATIENT
Start: 2020-10-08 | End: 2020-10-09 | Stop reason: HOSPADM

## 2020-10-08 RX ORDER — ATORVASTATIN CALCIUM 20 MG/1
20 TABLET, FILM COATED ORAL DAILY
Status: DISCONTINUED | OUTPATIENT
Start: 2020-10-09 | End: 2020-10-09 | Stop reason: HOSPADM

## 2020-10-08 RX ORDER — DOCUSATE SODIUM 100 MG/1
100 CAPSULE, LIQUID FILLED ORAL 2 TIMES DAILY
Status: DISCONTINUED | OUTPATIENT
Start: 2020-10-09 | End: 2020-10-09 | Stop reason: HOSPADM

## 2020-10-08 RX ORDER — HYDROMORPHONE HCL/PF 1 MG/ML
0.5 SYRINGE (ML) INJECTION ONCE
Status: COMPLETED | OUTPATIENT
Start: 2020-10-08 | End: 2020-10-08

## 2020-10-08 RX ORDER — MAGNESIUM HYDROXIDE/ALUMINUM HYDROXICE/SIMETHICONE 120; 1200; 1200 MG/30ML; MG/30ML; MG/30ML
20 SUSPENSION ORAL ONCE
Status: COMPLETED | OUTPATIENT
Start: 2020-10-08 | End: 2020-10-08

## 2020-10-08 RX ORDER — LEVOTHYROXINE SODIUM 0.05 MG/1
50 TABLET ORAL
Status: DISCONTINUED | OUTPATIENT
Start: 2020-10-09 | End: 2020-10-09 | Stop reason: HOSPADM

## 2020-10-08 RX ORDER — ACETAMINOPHEN 325 MG/1
650 TABLET ORAL EVERY 6 HOURS PRN
Status: DISCONTINUED | OUTPATIENT
Start: 2020-10-08 | End: 2020-10-09 | Stop reason: HOSPADM

## 2020-10-08 RX ORDER — PANTOPRAZOLE SODIUM 40 MG/1
40 TABLET, DELAYED RELEASE ORAL
Status: DISCONTINUED | OUTPATIENT
Start: 2020-10-09 | End: 2020-10-09 | Stop reason: HOSPADM

## 2020-10-08 RX ORDER — FAMOTIDINE 20 MG/1
20 TABLET, FILM COATED ORAL ONCE
Status: COMPLETED | OUTPATIENT
Start: 2020-10-08 | End: 2020-10-08

## 2020-10-08 RX ORDER — POLYETHYLENE GLYCOL 3350 17 G/17G
17 POWDER, FOR SOLUTION ORAL DAILY
Status: DISCONTINUED | OUTPATIENT
Start: 2020-10-09 | End: 2020-10-09

## 2020-10-08 RX ORDER — ALBUTEROL SULFATE 90 UG/1
2 AEROSOL, METERED RESPIRATORY (INHALATION) EVERY 4 HOURS PRN
Status: DISCONTINUED | OUTPATIENT
Start: 2020-10-08 | End: 2020-10-09 | Stop reason: HOSPADM

## 2020-10-08 RX ORDER — ASPIRIN 325 MG
325 TABLET ORAL ONCE
Status: COMPLETED | OUTPATIENT
Start: 2020-10-08 | End: 2020-10-08

## 2020-10-08 RX ORDER — B-COMPLEX WITH VITAMIN C
1 TABLET ORAL
Status: DISCONTINUED | OUTPATIENT
Start: 2020-10-09 | End: 2020-10-09 | Stop reason: HOSPADM

## 2020-10-08 RX ORDER — PANTOPRAZOLE SODIUM 40 MG/1
40 INJECTION, POWDER, FOR SOLUTION INTRAVENOUS ONCE
Status: COMPLETED | OUTPATIENT
Start: 2020-10-08 | End: 2020-10-08

## 2020-10-08 RX ORDER — DULOXETIN HYDROCHLORIDE 60 MG/1
60 CAPSULE, DELAYED RELEASE ORAL 2 TIMES DAILY
Status: DISCONTINUED | OUTPATIENT
Start: 2020-10-09 | End: 2020-10-09 | Stop reason: HOSPADM

## 2020-10-08 RX ORDER — SUCRALFATE 1 G/1
1 TABLET ORAL ONCE
Status: COMPLETED | OUTPATIENT
Start: 2020-10-08 | End: 2020-10-08

## 2020-10-08 RX ADMIN — ONDANSETRON 4 MG: 2 INJECTION INTRAMUSCULAR; INTRAVENOUS at 18:14

## 2020-10-08 RX ADMIN — PANTOPRAZOLE SODIUM 40 MG: 40 INJECTION, POWDER, FOR SOLUTION INTRAVENOUS at 19:07

## 2020-10-08 RX ADMIN — FAMOTIDINE 20 MG: 20 TABLET, FILM COATED ORAL at 18:05

## 2020-10-08 RX ADMIN — HYDROMORPHONE HYDROCHLORIDE 0.5 MG: 1 INJECTION, SOLUTION INTRAMUSCULAR; INTRAVENOUS; SUBCUTANEOUS at 22:02

## 2020-10-08 RX ADMIN — SUCRALFATE 1 G: 1 TABLET ORAL at 19:07

## 2020-10-08 RX ADMIN — ALUMINUM HYDROXIDE, MAGNESIUM HYDROXIDE, AND SIMETHICONE 20 ML: 200; 200; 20 SUSPENSION ORAL at 18:07

## 2020-10-08 RX ADMIN — LIDOCAINE HYDROCHLORIDE 10 ML: 20 SOLUTION ORAL; TOPICAL at 18:07

## 2020-10-08 RX ADMIN — ASPIRIN 325 MG ORAL TABLET 325 MG: 325 PILL ORAL at 22:00

## 2020-10-08 RX ADMIN — IOHEXOL 100 ML: 350 INJECTION, SOLUTION INTRAVENOUS at 20:34

## 2020-10-09 ENCOUNTER — TRANSITIONAL CARE MANAGEMENT (OUTPATIENT)
Dept: FAMILY MEDICINE CLINIC | Facility: CLINIC | Age: 65
End: 2020-10-09

## 2020-10-09 VITALS
OXYGEN SATURATION: 94 % | HEIGHT: 64 IN | SYSTOLIC BLOOD PRESSURE: 98 MMHG | RESPIRATION RATE: 18 BRPM | HEART RATE: 80 BPM | BODY MASS INDEX: 29.19 KG/M2 | DIASTOLIC BLOOD PRESSURE: 56 MMHG | WEIGHT: 171 LBS | TEMPERATURE: 97.5 F

## 2020-10-09 DIAGNOSIS — E03.8 OTHER SPECIFIED HYPOTHYROIDISM: ICD-10-CM

## 2020-10-09 LAB
ATRIAL RATE: 88 BPM
P AXIS: 54 DEGREES
PR INTERVAL: 170 MS
QRS AXIS: -14 DEGREES
QRSD INTERVAL: 78 MS
QT INTERVAL: 356 MS
QTC INTERVAL: 424 MS
T WAVE AXIS: 27 DEGREES
TROPONIN I SERPL-MCNC: <0.02 NG/ML
TROPONIN I SERPL-MCNC: <0.02 NG/ML
VENTRICULAR RATE: 85 BPM

## 2020-10-09 PROCEDURE — 84484 ASSAY OF TROPONIN QUANT: CPT | Performed by: PHYSICIAN ASSISTANT

## 2020-10-09 PROCEDURE — 99217 PR OBSERVATION CARE DISCHARGE MANAGEMENT: CPT | Performed by: INTERNAL MEDICINE

## 2020-10-09 PROCEDURE — 93010 ELECTROCARDIOGRAM REPORT: CPT | Performed by: INTERNAL MEDICINE

## 2020-10-09 RX ORDER — POLYETHYLENE GLYCOL 3350 17 G/17G
17 POWDER, FOR SOLUTION ORAL 2 TIMES DAILY
Status: DISCONTINUED | OUTPATIENT
Start: 2020-10-09 | End: 2020-10-09 | Stop reason: HOSPADM

## 2020-10-09 RX ORDER — SUCRALFATE 1 G/1
1 TABLET ORAL
Status: DISCONTINUED | OUTPATIENT
Start: 2020-10-09 | End: 2020-10-09 | Stop reason: HOSPADM

## 2020-10-09 RX ORDER — DOCUSATE SODIUM 100 MG/1
100 CAPSULE, LIQUID FILLED ORAL 2 TIMES DAILY
Qty: 10 CAPSULE | Refills: 0
Start: 2020-10-09 | End: 2021-07-20 | Stop reason: HOSPADM

## 2020-10-09 RX ORDER — LEVOTHYROXINE SODIUM 0.05 MG/1
50 TABLET ORAL DAILY
Qty: 90 TABLET | Refills: 0 | Status: SHIPPED | OUTPATIENT
Start: 2020-10-09 | End: 2021-01-19 | Stop reason: SDUPTHER

## 2020-10-09 RX ORDER — SUCRALFATE 1 G/1
1 TABLET ORAL
Qty: 120 TABLET | Refills: 0 | Status: SHIPPED | OUTPATIENT
Start: 2020-10-09 | End: 2021-02-25 | Stop reason: ALTCHOICE

## 2020-10-09 RX ORDER — POLYETHYLENE GLYCOL 3350 17 G/17G
17 POWDER, FOR SOLUTION ORAL 2 TIMES DAILY
Refills: 0
Start: 2020-10-09 | End: 2021-07-20 | Stop reason: HOSPADM

## 2020-10-09 RX ADMIN — PANTOPRAZOLE SODIUM 40 MG: 40 TABLET, DELAYED RELEASE ORAL at 08:44

## 2020-10-09 RX ADMIN — POLYETHYLENE GLYCOL 3350 17 G: 17 POWDER, FOR SOLUTION ORAL at 08:41

## 2020-10-09 RX ADMIN — GABAPENTIN 600 MG: 300 CAPSULE ORAL at 08:41

## 2020-10-09 RX ADMIN — QUETIAPINE FUMARATE 150 MG: 100 TABLET ORAL at 00:08

## 2020-10-09 RX ADMIN — MIDODRINE HYDROCHLORIDE 10 MG: 5 TABLET ORAL at 12:12

## 2020-10-09 RX ADMIN — LEVOTHYROXINE SODIUM 50 MCG: 50 TABLET ORAL at 08:44

## 2020-10-09 RX ADMIN — OYSTER SHELL CALCIUM WITH VITAMIN D 1 TABLET: 500; 200 TABLET, FILM COATED ORAL at 08:41

## 2020-10-09 RX ADMIN — MIDODRINE HYDROCHLORIDE 10 MG: 5 TABLET ORAL at 08:41

## 2020-10-09 RX ADMIN — TRAZODONE HYDROCHLORIDE 150 MG: 100 TABLET ORAL at 00:08

## 2020-10-09 RX ADMIN — DULOXETINE HYDROCHLORIDE 60 MG: 60 CAPSULE, DELAYED RELEASE ORAL at 08:41

## 2020-10-09 RX ADMIN — SUCRALFATE 1 G: 1 TABLET ORAL at 10:39

## 2020-10-09 RX ADMIN — ENOXAPARIN SODIUM 40 MG: 40 INJECTION SUBCUTANEOUS at 10:39

## 2020-10-09 RX ADMIN — ATORVASTATIN CALCIUM 20 MG: 20 TABLET, FILM COATED ORAL at 08:41

## 2020-10-09 RX ADMIN — SENNOSIDES 8.6 MG: 8.6 TABLET, FILM COATED ORAL at 00:09

## 2020-10-09 RX ADMIN — DOCUSATE SODIUM 100 MG: 100 CAPSULE ORAL at 08:41

## 2020-10-12 ENCOUNTER — OFFICE VISIT (OUTPATIENT)
Dept: GASTROENTEROLOGY | Facility: AMBULARY SURGERY CENTER | Age: 65
End: 2020-10-12
Payer: COMMERCIAL

## 2020-10-12 VITALS — BODY MASS INDEX: 29.12 KG/M2 | WEIGHT: 170.6 LBS | TEMPERATURE: 97.4 F | HEIGHT: 64 IN

## 2020-10-12 DIAGNOSIS — K59.00 CONSTIPATION, UNSPECIFIED CONSTIPATION TYPE: ICD-10-CM

## 2020-10-12 DIAGNOSIS — K21.9 GASTROESOPHAGEAL REFLUX DISEASE WITHOUT ESOPHAGITIS: Primary | ICD-10-CM

## 2020-10-12 DIAGNOSIS — Z11.59 SPECIAL SCREENING EXAMINATION FOR VIRAL DISEASE: ICD-10-CM

## 2020-10-12 DIAGNOSIS — R13.19 ESOPHAGEAL DYSPHAGIA: ICD-10-CM

## 2020-10-12 PROCEDURE — 1111F DSCHRG MED/CURRENT MED MERGE: CPT | Performed by: PHYSICIAN ASSISTANT

## 2020-10-12 PROCEDURE — 99214 OFFICE O/P EST MOD 30 MIN: CPT | Performed by: PHYSICIAN ASSISTANT

## 2020-10-12 RX ORDER — PANTOPRAZOLE SODIUM 40 MG/1
40 TABLET, DELAYED RELEASE ORAL
Qty: 60 TABLET | Refills: 5 | Status: SHIPPED | OUTPATIENT
Start: 2020-10-12 | End: 2020-10-26 | Stop reason: HOSPADM

## 2020-10-15 ENCOUNTER — OFFICE VISIT (OUTPATIENT)
Dept: FAMILY MEDICINE CLINIC | Facility: CLINIC | Age: 65
End: 2020-10-15
Payer: COMMERCIAL

## 2020-10-15 VITALS
BODY MASS INDEX: 28.68 KG/M2 | OXYGEN SATURATION: 98 % | DIASTOLIC BLOOD PRESSURE: 66 MMHG | RESPIRATION RATE: 18 BRPM | SYSTOLIC BLOOD PRESSURE: 124 MMHG | TEMPERATURE: 97.2 F | HEART RATE: 81 BPM | HEIGHT: 64 IN | WEIGHT: 168 LBS

## 2020-10-15 DIAGNOSIS — K59.00 CONSTIPATION, UNSPECIFIED CONSTIPATION TYPE: ICD-10-CM

## 2020-10-15 DIAGNOSIS — E03.9 ADULT HYPOTHYROIDISM: ICD-10-CM

## 2020-10-15 DIAGNOSIS — F33.1 MODERATE EPISODE OF RECURRENT MAJOR DEPRESSIVE DISORDER (HCC): ICD-10-CM

## 2020-10-15 DIAGNOSIS — I95.1 AUTONOMIC ORTHOSTATIC HYPOTENSION: ICD-10-CM

## 2020-10-15 DIAGNOSIS — K21.00 GASTROESOPHAGEAL REFLUX DISEASE WITH ESOPHAGITIS WITHOUT HEMORRHAGE: Primary | ICD-10-CM

## 2020-10-15 PROCEDURE — 1111F DSCHRG MED/CURRENT MED MERGE: CPT | Performed by: NURSE PRACTITIONER

## 2020-10-15 PROCEDURE — 99496 TRANSJ CARE MGMT HIGH F2F 7D: CPT | Performed by: NURSE PRACTITIONER

## 2020-10-20 ENCOUNTER — TELEPHONE (OUTPATIENT)
Dept: FAMILY MEDICINE CLINIC | Facility: CLINIC | Age: 65
End: 2020-10-20

## 2020-10-20 ENCOUNTER — HOSPITAL ENCOUNTER (INPATIENT)
Facility: HOSPITAL | Age: 65
LOS: 4 days | Discharge: HOME | DRG: 312 | End: 2020-10-26
Attending: EMERGENCY MEDICINE | Admitting: FAMILY MEDICINE
Payer: COMMERCIAL

## 2020-10-20 ENCOUNTER — APPOINTMENT (EMERGENCY)
Dept: RADIOLOGY | Facility: HOSPITAL | Age: 65
DRG: 312 | End: 2020-10-20
Payer: COMMERCIAL

## 2020-10-20 DIAGNOSIS — R10.9 ABDOMINAL PAIN: ICD-10-CM

## 2020-10-20 DIAGNOSIS — S09.90XA INJURY OF HEAD, INITIAL ENCOUNTER: ICD-10-CM

## 2020-10-20 DIAGNOSIS — E86.0 DEHYDRATION: ICD-10-CM

## 2020-10-20 DIAGNOSIS — M79.7 FIBROMYALGIA: ICD-10-CM

## 2020-10-20 DIAGNOSIS — F33.41 MAJOR DEPRESSIVE DISORDER, RECURRENT, IN PARTIAL REMISSION (HCC): ICD-10-CM

## 2020-10-20 DIAGNOSIS — R55 SYNCOPE: Primary | ICD-10-CM

## 2020-10-20 DIAGNOSIS — F41.1 GENERALIZED ANXIETY DISORDER: ICD-10-CM

## 2020-10-20 DIAGNOSIS — F32.A DEPRESSION: ICD-10-CM

## 2020-10-20 DIAGNOSIS — R53.1 RIGHT SIDED WEAKNESS: ICD-10-CM

## 2020-10-20 DIAGNOSIS — I95.1 ORTHOSTATIC HYPOTENSION: ICD-10-CM

## 2020-10-20 LAB
ALBUMIN SERPL BCP-MCNC: 3.2 G/DL (ref 3.5–5)
ALP SERPL-CCNC: 72 U/L (ref 46–116)
ALT SERPL W P-5'-P-CCNC: 26 U/L (ref 12–78)
ANION GAP SERPL CALCULATED.3IONS-SCNC: 5 MMOL/L (ref 4–13)
APTT PPP: 26 SECONDS (ref 23–37)
AST SERPL W P-5'-P-CCNC: 32 U/L (ref 5–45)
BASOPHILS # BLD AUTO: 0.03 THOUSANDS/ΜL (ref 0–0.1)
BASOPHILS NFR BLD AUTO: 1 % (ref 0–1)
BILIRUB SERPL-MCNC: 0.4 MG/DL (ref 0.2–1)
BUN SERPL-MCNC: 16 MG/DL (ref 5–25)
CALCIUM ALBUM COR SERPL-MCNC: 9.2 MG/DL (ref 8.3–10.1)
CALCIUM SERPL-MCNC: 8.6 MG/DL (ref 8.3–10.1)
CHLORIDE SERPL-SCNC: 107 MMOL/L (ref 100–108)
CK SERPL-CCNC: 120 U/L (ref 26–192)
CO2 SERPL-SCNC: 30 MMOL/L (ref 21–32)
CREAT SERPL-MCNC: 0.94 MG/DL (ref 0.6–1.3)
EOSINOPHIL # BLD AUTO: 0.12 THOUSAND/ΜL (ref 0–0.61)
EOSINOPHIL NFR BLD AUTO: 2 % (ref 0–6)
ERYTHROCYTE [DISTWIDTH] IN BLOOD BY AUTOMATED COUNT: 12.8 % (ref 11.6–15.1)
GFR SERPL CREATININE-BSD FRML MDRD: 64 ML/MIN/1.73SQ M
GLUCOSE SERPL-MCNC: 105 MG/DL (ref 65–140)
HCT VFR BLD AUTO: 36.3 % (ref 34.8–46.1)
HGB BLD-MCNC: 11.3 G/DL (ref 11.5–15.4)
IMM GRANULOCYTES # BLD AUTO: 0.01 THOUSAND/UL (ref 0–0.2)
IMM GRANULOCYTES NFR BLD AUTO: 0 % (ref 0–2)
INR PPP: 1.03 (ref 0.84–1.19)
LYMPHOCYTES # BLD AUTO: 1.29 THOUSANDS/ΜL (ref 0.6–4.47)
LYMPHOCYTES NFR BLD AUTO: 26 % (ref 14–44)
MCH RBC QN AUTO: 27.9 PG (ref 26.8–34.3)
MCHC RBC AUTO-ENTMCNC: 31.1 G/DL (ref 31.4–37.4)
MCV RBC AUTO: 90 FL (ref 82–98)
MONOCYTES # BLD AUTO: 0.5 THOUSAND/ΜL (ref 0.17–1.22)
MONOCYTES NFR BLD AUTO: 10 % (ref 4–12)
NEUTROPHILS # BLD AUTO: 3.08 THOUSANDS/ΜL (ref 1.85–7.62)
NEUTS SEG NFR BLD AUTO: 61 % (ref 43–75)
NRBC BLD AUTO-RTO: 0 /100 WBCS
PLATELET # BLD AUTO: 198 THOUSANDS/UL (ref 149–390)
PMV BLD AUTO: 10.7 FL (ref 8.9–12.7)
POTASSIUM SERPL-SCNC: 4.8 MMOL/L (ref 3.5–5.3)
PROT SERPL-MCNC: 6.6 G/DL (ref 6.4–8.2)
PROTHROMBIN TIME: 13.4 SECONDS (ref 11.6–14.5)
RBC # BLD AUTO: 4.05 MILLION/UL (ref 3.81–5.12)
SODIUM SERPL-SCNC: 142 MMOL/L (ref 136–145)
TROPONIN I SERPL-MCNC: <0.02 NG/ML
TSH SERPL DL<=0.05 MIU/L-ACNC: 2.67 UIU/ML (ref 0.36–3.74)
WBC # BLD AUTO: 5.03 THOUSAND/UL (ref 4.31–10.16)

## 2020-10-20 PROCEDURE — 71045 X-RAY EXAM CHEST 1 VIEW: CPT

## 2020-10-20 PROCEDURE — 85730 THROMBOPLASTIN TIME PARTIAL: CPT | Performed by: PHYSICIAN ASSISTANT

## 2020-10-20 PROCEDURE — 82550 ASSAY OF CK (CPK): CPT | Performed by: PHYSICIAN ASSISTANT

## 2020-10-20 PROCEDURE — 93005 ELECTROCARDIOGRAM TRACING: CPT

## 2020-10-20 PROCEDURE — 99285 EMERGENCY DEPT VISIT HI MDM: CPT

## 2020-10-20 PROCEDURE — 36415 COLL VENOUS BLD VENIPUNCTURE: CPT

## 2020-10-20 PROCEDURE — 85610 PROTHROMBIN TIME: CPT | Performed by: PHYSICIAN ASSISTANT

## 2020-10-20 PROCEDURE — 70496 CT ANGIOGRAPHY HEAD: CPT

## 2020-10-20 PROCEDURE — 70498 CT ANGIOGRAPHY NECK: CPT

## 2020-10-20 PROCEDURE — 84484 ASSAY OF TROPONIN QUANT: CPT

## 2020-10-20 PROCEDURE — 99220 PR INITIAL OBSERVATION CARE/DAY 70 MINUTES: CPT | Performed by: FAMILY MEDICINE

## 2020-10-20 PROCEDURE — 85025 COMPLETE CBC W/AUTO DIFF WBC: CPT

## 2020-10-20 PROCEDURE — 96360 HYDRATION IV INFUSION INIT: CPT

## 2020-10-20 PROCEDURE — 84439 ASSAY OF FREE THYROXINE: CPT | Performed by: NURSE PRACTITIONER

## 2020-10-20 PROCEDURE — G1004 CDSM NDSC: HCPCS

## 2020-10-20 PROCEDURE — 99285 EMERGENCY DEPT VISIT HI MDM: CPT | Performed by: PHYSICIAN ASSISTANT

## 2020-10-20 PROCEDURE — 84481 FREE ASSAY (FT-3): CPT | Performed by: NURSE PRACTITIONER

## 2020-10-20 PROCEDURE — 84443 ASSAY THYROID STIM HORMONE: CPT | Performed by: NURSE PRACTITIONER

## 2020-10-20 PROCEDURE — 80053 COMPREHEN METABOLIC PANEL: CPT

## 2020-10-20 RX ORDER — MECLIZINE HYDROCHLORIDE 25 MG/1
25 TABLET ORAL EVERY 8 HOURS PRN
Status: DISCONTINUED | OUTPATIENT
Start: 2020-10-20 | End: 2020-10-26 | Stop reason: HOSPADM

## 2020-10-20 RX ORDER — TRAZODONE HYDROCHLORIDE 50 MG/1
150 TABLET ORAL
Status: DISCONTINUED | OUTPATIENT
Start: 2020-10-20 | End: 2020-10-25

## 2020-10-20 RX ORDER — MAGNESIUM HYDROXIDE/ALUMINUM HYDROXICE/SIMETHICONE 120; 1200; 1200 MG/30ML; MG/30ML; MG/30ML
30 SUSPENSION ORAL EVERY 6 HOURS PRN
Status: DISCONTINUED | OUTPATIENT
Start: 2020-10-20 | End: 2020-10-26 | Stop reason: HOSPADM

## 2020-10-20 RX ORDER — B-COMPLEX WITH VITAMIN C
1 TABLET ORAL DAILY
Status: DISCONTINUED | OUTPATIENT
Start: 2020-10-21 | End: 2020-10-26 | Stop reason: HOSPADM

## 2020-10-20 RX ORDER — DOCUSATE SODIUM 100 MG/1
100 CAPSULE, LIQUID FILLED ORAL 2 TIMES DAILY
Status: DISCONTINUED | OUTPATIENT
Start: 2020-10-20 | End: 2020-10-26 | Stop reason: HOSPADM

## 2020-10-20 RX ORDER — SODIUM CHLORIDE 9 MG/ML
50 INJECTION, SOLUTION INTRAVENOUS CONTINUOUS
Status: DISCONTINUED | OUTPATIENT
Start: 2020-10-20 | End: 2020-10-26

## 2020-10-20 RX ORDER — ACETAMINOPHEN 325 MG/1
650 TABLET ORAL EVERY 6 HOURS PRN
Status: DISCONTINUED | OUTPATIENT
Start: 2020-10-20 | End: 2020-10-26 | Stop reason: HOSPADM

## 2020-10-20 RX ORDER — MIDODRINE HYDROCHLORIDE 5 MG/1
10 TABLET ORAL 3 TIMES DAILY
Status: DISCONTINUED | OUTPATIENT
Start: 2020-10-20 | End: 2020-10-26 | Stop reason: HOSPADM

## 2020-10-20 RX ORDER — SUCRALFATE 1 G/1
1 TABLET ORAL
Status: DISCONTINUED | OUTPATIENT
Start: 2020-10-21 | End: 2020-10-26 | Stop reason: HOSPADM

## 2020-10-20 RX ORDER — DULOXETIN HYDROCHLORIDE 30 MG/1
60 CAPSULE, DELAYED RELEASE ORAL 2 TIMES DAILY
Status: DISCONTINUED | OUTPATIENT
Start: 2020-10-20 | End: 2020-10-26 | Stop reason: HOSPADM

## 2020-10-20 RX ORDER — ONDANSETRON 2 MG/ML
4 INJECTION INTRAMUSCULAR; INTRAVENOUS EVERY 6 HOURS PRN
Status: DISCONTINUED | OUTPATIENT
Start: 2020-10-20 | End: 2020-10-26 | Stop reason: HOSPADM

## 2020-10-20 RX ORDER — LEVOTHYROXINE SODIUM 0.05 MG/1
50 TABLET ORAL
Status: DISCONTINUED | OUTPATIENT
Start: 2020-10-21 | End: 2020-10-26 | Stop reason: HOSPADM

## 2020-10-20 RX ORDER — ATORVASTATIN CALCIUM 40 MG/1
20 TABLET, FILM COATED ORAL
Status: DISCONTINUED | OUTPATIENT
Start: 2020-10-20 | End: 2020-10-26 | Stop reason: HOSPADM

## 2020-10-20 RX ORDER — GABAPENTIN 300 MG/1
300 CAPSULE ORAL 2 TIMES DAILY
Status: DISCONTINUED | OUTPATIENT
Start: 2020-10-20 | End: 2020-10-26 | Stop reason: HOSPADM

## 2020-10-20 RX ADMIN — QUETIAPINE FUMARATE 150 MG: 100 TABLET ORAL at 22:05

## 2020-10-20 RX ADMIN — DULOXETINE HYDROCHLORIDE 60 MG: 30 CAPSULE, DELAYED RELEASE ORAL at 21:04

## 2020-10-20 RX ADMIN — MIDODRINE HYDROCHLORIDE 10 MG: 5 TABLET ORAL at 21:04

## 2020-10-20 RX ADMIN — SODIUM CHLORIDE 1000 ML: 0.9 INJECTION, SOLUTION INTRAVENOUS at 15:23

## 2020-10-20 RX ADMIN — IOHEXOL 85 ML: 350 INJECTION, SOLUTION INTRAVENOUS at 15:18

## 2020-10-20 RX ADMIN — ATORVASTATIN CALCIUM 20 MG: 40 TABLET, FILM COATED ORAL at 21:03

## 2020-10-20 RX ADMIN — GABAPENTIN 300 MG: 300 CAPSULE ORAL at 21:03

## 2020-10-20 RX ADMIN — TRAZODONE HYDROCHLORIDE 150 MG: 50 TABLET ORAL at 22:05

## 2020-10-21 LAB
ALBUMIN SERPL BCP-MCNC: 2.9 G/DL (ref 3.5–5)
ALP SERPL-CCNC: 68 U/L (ref 46–116)
ALT SERPL W P-5'-P-CCNC: 25 U/L (ref 12–78)
ANION GAP SERPL CALCULATED.3IONS-SCNC: 8 MMOL/L (ref 4–13)
AST SERPL W P-5'-P-CCNC: 17 U/L (ref 5–45)
BASOPHILS # BLD AUTO: 0.03 THOUSANDS/ΜL (ref 0–0.1)
BASOPHILS NFR BLD AUTO: 1 % (ref 0–1)
BILIRUB SERPL-MCNC: 0.4 MG/DL (ref 0.2–1)
BUN SERPL-MCNC: 12 MG/DL (ref 5–25)
CALCIUM ALBUM COR SERPL-MCNC: 9.4 MG/DL (ref 8.3–10.1)
CALCIUM SERPL-MCNC: 8.5 MG/DL (ref 8.3–10.1)
CHLORIDE SERPL-SCNC: 109 MMOL/L (ref 100–108)
CO2 SERPL-SCNC: 27 MMOL/L (ref 21–32)
CREAT SERPL-MCNC: 0.83 MG/DL (ref 0.6–1.3)
EOSINOPHIL # BLD AUTO: 0.13 THOUSAND/ΜL (ref 0–0.61)
EOSINOPHIL NFR BLD AUTO: 3 % (ref 0–6)
ERYTHROCYTE [DISTWIDTH] IN BLOOD BY AUTOMATED COUNT: 12.8 % (ref 11.6–15.1)
GFR SERPL CREATININE-BSD FRML MDRD: 75 ML/MIN/1.73SQ M
GLUCOSE P FAST SERPL-MCNC: 97 MG/DL (ref 65–99)
GLUCOSE SERPL-MCNC: 97 MG/DL (ref 65–140)
HCT VFR BLD AUTO: 38 % (ref 34.8–46.1)
HGB BLD-MCNC: 11.6 G/DL (ref 11.5–15.4)
IMM GRANULOCYTES # BLD AUTO: 0.01 THOUSAND/UL (ref 0–0.2)
IMM GRANULOCYTES NFR BLD AUTO: 0 % (ref 0–2)
LYMPHOCYTES # BLD AUTO: 1.16 THOUSANDS/ΜL (ref 0.6–4.47)
LYMPHOCYTES NFR BLD AUTO: 27 % (ref 14–44)
MAGNESIUM SERPL-MCNC: 2.2 MG/DL (ref 1.6–2.6)
MCH RBC QN AUTO: 27.3 PG (ref 26.8–34.3)
MCHC RBC AUTO-ENTMCNC: 30.5 G/DL (ref 31.4–37.4)
MCV RBC AUTO: 89 FL (ref 82–98)
MONOCYTES # BLD AUTO: 0.41 THOUSAND/ΜL (ref 0.17–1.22)
MONOCYTES NFR BLD AUTO: 10 % (ref 4–12)
NEUTROPHILS # BLD AUTO: 2.55 THOUSANDS/ΜL (ref 1.85–7.62)
NEUTS SEG NFR BLD AUTO: 59 % (ref 43–75)
NRBC BLD AUTO-RTO: 0 /100 WBCS
PLATELET # BLD AUTO: 201 THOUSANDS/UL (ref 149–390)
PMV BLD AUTO: 11.2 FL (ref 8.9–12.7)
POTASSIUM SERPL-SCNC: 4.1 MMOL/L (ref 3.5–5.3)
PROT SERPL-MCNC: 5.9 G/DL (ref 6.4–8.2)
RBC # BLD AUTO: 4.25 MILLION/UL (ref 3.81–5.12)
SODIUM SERPL-SCNC: 144 MMOL/L (ref 136–145)
T3FREE SERPL-MCNC: 2.14 PG/ML (ref 2.3–4.2)
T4 FREE SERPL-MCNC: 0.76 NG/DL (ref 0.76–1.46)
WBC # BLD AUTO: 4.29 THOUSAND/UL (ref 4.31–10.16)

## 2020-10-21 PROCEDURE — 83735 ASSAY OF MAGNESIUM: CPT | Performed by: NURSE PRACTITIONER

## 2020-10-21 PROCEDURE — 80053 COMPREHEN METABOLIC PANEL: CPT | Performed by: NURSE PRACTITIONER

## 2020-10-21 PROCEDURE — 97167 OT EVAL HIGH COMPLEX 60 MIN: CPT

## 2020-10-21 PROCEDURE — 99245 OFF/OP CONSLTJ NEW/EST HI 55: CPT | Performed by: INTERNAL MEDICINE

## 2020-10-21 PROCEDURE — 99225 PR SBSQ OBSERVATION CARE/DAY 25 MINUTES: CPT | Performed by: NURSE PRACTITIONER

## 2020-10-21 PROCEDURE — 97163 PT EVAL HIGH COMPLEX 45 MIN: CPT

## 2020-10-21 PROCEDURE — 85025 COMPLETE CBC W/AUTO DIFF WBC: CPT | Performed by: NURSE PRACTITIONER

## 2020-10-21 PROCEDURE — 84100 ASSAY OF PHOSPHORUS: CPT | Performed by: NURSE PRACTITIONER

## 2020-10-21 PROCEDURE — 97110 THERAPEUTIC EXERCISES: CPT

## 2020-10-21 RX ORDER — FLUDROCORTISONE ACETATE 0.1 MG/1
0.1 TABLET ORAL DAILY
Status: DISCONTINUED | OUTPATIENT
Start: 2020-10-21 | End: 2020-10-26 | Stop reason: HOSPADM

## 2020-10-21 RX ADMIN — FLUDROCORTISONE ACETATE 0.1 MG: 0.1 TABLET ORAL at 12:05

## 2020-10-21 RX ADMIN — MIDODRINE HYDROCHLORIDE 10 MG: 5 TABLET ORAL at 22:29

## 2020-10-21 RX ADMIN — MIDODRINE HYDROCHLORIDE 10 MG: 5 TABLET ORAL at 08:32

## 2020-10-21 RX ADMIN — ACETAMINOPHEN 650 MG: 325 TABLET, FILM COATED ORAL at 17:07

## 2020-10-21 RX ADMIN — SODIUM CHLORIDE 75 ML/HR: 0.9 INJECTION, SOLUTION INTRAVENOUS at 01:26

## 2020-10-21 RX ADMIN — MIDODRINE HYDROCHLORIDE 10 MG: 5 TABLET ORAL at 17:05

## 2020-10-21 RX ADMIN — DOCUSATE SODIUM 100 MG: 100 CAPSULE, LIQUID FILLED ORAL at 17:07

## 2020-10-21 RX ADMIN — QUETIAPINE FUMARATE 150 MG: 100 TABLET ORAL at 22:29

## 2020-10-21 RX ADMIN — GABAPENTIN 300 MG: 300 CAPSULE ORAL at 17:05

## 2020-10-21 RX ADMIN — SUCRALFATE 1 G: 1 TABLET ORAL at 17:07

## 2020-10-21 RX ADMIN — GABAPENTIN 300 MG: 300 CAPSULE ORAL at 08:32

## 2020-10-21 RX ADMIN — SUCRALFATE 1 G: 1 TABLET ORAL at 08:32

## 2020-10-21 RX ADMIN — TRAZODONE HYDROCHLORIDE 150 MG: 50 TABLET ORAL at 22:29

## 2020-10-21 RX ADMIN — LEVOTHYROXINE SODIUM 50 MCG: 50 TABLET ORAL at 05:34

## 2020-10-21 RX ADMIN — SODIUM CHLORIDE 75 ML/HR: 0.9 INJECTION, SOLUTION INTRAVENOUS at 14:31

## 2020-10-21 RX ADMIN — DOCUSATE SODIUM 100 MG: 100 CAPSULE, LIQUID FILLED ORAL at 08:32

## 2020-10-21 RX ADMIN — DULOXETINE HYDROCHLORIDE 60 MG: 30 CAPSULE, DELAYED RELEASE ORAL at 08:31

## 2020-10-21 RX ADMIN — ATORVASTATIN CALCIUM 20 MG: 40 TABLET, FILM COATED ORAL at 17:06

## 2020-10-21 RX ADMIN — ENOXAPARIN SODIUM 40 MG: 40 INJECTION SUBCUTANEOUS at 08:33

## 2020-10-21 RX ADMIN — OYSTER SHELL CALCIUM WITH VITAMIN D 1 TABLET: 500; 200 TABLET, FILM COATED ORAL at 08:31

## 2020-10-21 RX ADMIN — DULOXETINE HYDROCHLORIDE 60 MG: 30 CAPSULE, DELAYED RELEASE ORAL at 17:05

## 2020-10-22 PROCEDURE — 99232 SBSQ HOSP IP/OBS MODERATE 35: CPT | Performed by: INTERNAL MEDICINE

## 2020-10-22 RX ADMIN — SODIUM CHLORIDE 75 ML/HR: 0.9 INJECTION, SOLUTION INTRAVENOUS at 18:10

## 2020-10-22 RX ADMIN — DOCUSATE SODIUM 100 MG: 100 CAPSULE, LIQUID FILLED ORAL at 09:54

## 2020-10-22 RX ADMIN — DICLOFENAC SODIUM 2 G: 10 GEL TOPICAL at 15:25

## 2020-10-22 RX ADMIN — FLUDROCORTISONE ACETATE 0.1 MG: 0.1 TABLET ORAL at 09:54

## 2020-10-22 RX ADMIN — GABAPENTIN 300 MG: 300 CAPSULE ORAL at 09:54

## 2020-10-22 RX ADMIN — DICLOFENAC SODIUM 2 G: 10 GEL TOPICAL at 19:22

## 2020-10-22 RX ADMIN — MIDODRINE HYDROCHLORIDE 10 MG: 5 TABLET ORAL at 22:19

## 2020-10-22 RX ADMIN — LEVOTHYROXINE SODIUM 50 MCG: 50 TABLET ORAL at 06:26

## 2020-10-22 RX ADMIN — ENOXAPARIN SODIUM 40 MG: 40 INJECTION SUBCUTANEOUS at 09:53

## 2020-10-22 RX ADMIN — ONDANSETRON 4 MG: 2 INJECTION INTRAMUSCULAR; INTRAVENOUS at 09:57

## 2020-10-22 RX ADMIN — TRAZODONE HYDROCHLORIDE 150 MG: 50 TABLET ORAL at 22:19

## 2020-10-22 RX ADMIN — ACETAMINOPHEN 650 MG: 325 TABLET, FILM COATED ORAL at 22:23

## 2020-10-22 RX ADMIN — QUETIAPINE FUMARATE 150 MG: 100 TABLET ORAL at 22:19

## 2020-10-22 RX ADMIN — GABAPENTIN 300 MG: 300 CAPSULE ORAL at 19:22

## 2020-10-22 RX ADMIN — DULOXETINE HYDROCHLORIDE 60 MG: 30 CAPSULE, DELAYED RELEASE ORAL at 19:22

## 2020-10-22 RX ADMIN — MIDODRINE HYDROCHLORIDE 10 MG: 5 TABLET ORAL at 09:54

## 2020-10-22 RX ADMIN — ATORVASTATIN CALCIUM 20 MG: 40 TABLET, FILM COATED ORAL at 19:22

## 2020-10-22 RX ADMIN — OYSTER SHELL CALCIUM WITH VITAMIN D 1 TABLET: 500; 200 TABLET, FILM COATED ORAL at 09:54

## 2020-10-22 RX ADMIN — ALUMINUM HYDROXIDE, MAGNESIUM HYDROXIDE, AND SIMETHICONE 30 ML: 200; 200; 20 SUSPENSION ORAL at 22:32

## 2020-10-22 RX ADMIN — DULOXETINE HYDROCHLORIDE 60 MG: 30 CAPSULE, DELAYED RELEASE ORAL at 09:54

## 2020-10-22 RX ADMIN — SUCRALFATE 1 G: 1 TABLET ORAL at 06:26

## 2020-10-23 ENCOUNTER — APPOINTMENT (INPATIENT)
Dept: RADIOLOGY | Facility: HOSPITAL | Age: 65
DRG: 312 | End: 2020-10-23
Payer: COMMERCIAL

## 2020-10-23 PROBLEM — R10.9 ABDOMINAL PAIN: Status: ACTIVE | Noted: 2020-10-23

## 2020-10-23 PROBLEM — F32.A DEPRESSION: Status: ACTIVE | Noted: 2020-10-23

## 2020-10-23 PROBLEM — R53.81 MALAISE: Status: ACTIVE | Noted: 2020-10-23

## 2020-10-23 LAB — PHOSPHATE SERPL-MCNC: 4.2 MG/DL (ref 2.3–4.1)

## 2020-10-23 PROCEDURE — 74022 RADEX COMPL AQT ABD SERIES: CPT

## 2020-10-23 PROCEDURE — 99232 SBSQ HOSP IP/OBS MODERATE 35: CPT | Performed by: NURSE PRACTITIONER

## 2020-10-23 PROCEDURE — 99232 SBSQ HOSP IP/OBS MODERATE 35: CPT | Performed by: INTERNAL MEDICINE

## 2020-10-23 PROCEDURE — 97535 SELF CARE MNGMENT TRAINING: CPT

## 2020-10-23 RX ADMIN — MIDODRINE HYDROCHLORIDE 10 MG: 5 TABLET ORAL at 18:47

## 2020-10-23 RX ADMIN — MIDODRINE HYDROCHLORIDE 10 MG: 5 TABLET ORAL at 08:30

## 2020-10-23 RX ADMIN — ATORVASTATIN CALCIUM 20 MG: 40 TABLET, FILM COATED ORAL at 18:47

## 2020-10-23 RX ADMIN — DICLOFENAC SODIUM 2 G: 10 GEL TOPICAL at 08:33

## 2020-10-23 RX ADMIN — GABAPENTIN 300 MG: 300 CAPSULE ORAL at 08:29

## 2020-10-23 RX ADMIN — ENOXAPARIN SODIUM 40 MG: 40 INJECTION SUBCUTANEOUS at 08:30

## 2020-10-23 RX ADMIN — SUCRALFATE 1 G: 1 TABLET ORAL at 06:06

## 2020-10-23 RX ADMIN — SUCRALFATE 1 G: 1 TABLET ORAL at 18:48

## 2020-10-23 RX ADMIN — MIDODRINE HYDROCHLORIDE 10 MG: 5 TABLET ORAL at 22:26

## 2020-10-23 RX ADMIN — DICLOFENAC SODIUM 2 G: 10 GEL TOPICAL at 13:29

## 2020-10-23 RX ADMIN — ONDANSETRON 4 MG: 2 INJECTION INTRAMUSCULAR; INTRAVENOUS at 13:35

## 2020-10-23 RX ADMIN — DULOXETINE HYDROCHLORIDE 60 MG: 30 CAPSULE, DELAYED RELEASE ORAL at 18:48

## 2020-10-23 RX ADMIN — FLUDROCORTISONE ACETATE 0.1 MG: 0.1 TABLET ORAL at 08:31

## 2020-10-23 RX ADMIN — GABAPENTIN 300 MG: 300 CAPSULE ORAL at 18:47

## 2020-10-23 RX ADMIN — TRAZODONE HYDROCHLORIDE 150 MG: 50 TABLET ORAL at 22:25

## 2020-10-23 RX ADMIN — OYSTER SHELL CALCIUM WITH VITAMIN D 1 TABLET: 500; 200 TABLET, FILM COATED ORAL at 08:31

## 2020-10-23 RX ADMIN — DULOXETINE HYDROCHLORIDE 60 MG: 30 CAPSULE, DELAYED RELEASE ORAL at 08:29

## 2020-10-23 RX ADMIN — DICLOFENAC SODIUM 2 G: 10 GEL TOPICAL at 18:47

## 2020-10-23 RX ADMIN — QUETIAPINE FUMARATE 150 MG: 100 TABLET ORAL at 22:26

## 2020-10-23 RX ADMIN — DICLOFENAC SODIUM 2 G: 10 GEL TOPICAL at 22:27

## 2020-10-23 RX ADMIN — SODIUM CHLORIDE 75 ML/HR: 0.9 INJECTION, SOLUTION INTRAVENOUS at 22:28

## 2020-10-23 RX ADMIN — ACETAMINOPHEN 650 MG: 325 TABLET, FILM COATED ORAL at 15:23

## 2020-10-23 RX ADMIN — LEVOTHYROXINE SODIUM 50 MCG: 50 TABLET ORAL at 06:06

## 2020-10-24 ENCOUNTER — APPOINTMENT (INPATIENT)
Dept: RADIOLOGY | Facility: HOSPITAL | Age: 65
DRG: 312 | End: 2020-10-24
Payer: COMMERCIAL

## 2020-10-24 PROBLEM — R29.898 LEG WEAKNESS, BILATERAL: Status: ACTIVE | Noted: 2020-10-23

## 2020-10-24 LAB
ANION GAP SERPL CALCULATED.3IONS-SCNC: 6 MMOL/L (ref 4–13)
ATRIAL RATE: 84 BPM
BACTERIA UR QL AUTO: ABNORMAL /HPF
BILIRUB UR QL STRIP: NEGATIVE
BUN SERPL-MCNC: 9 MG/DL (ref 5–25)
CALCIUM SERPL-MCNC: 8.6 MG/DL (ref 8.3–10.1)
CHLORIDE SERPL-SCNC: 110 MMOL/L (ref 100–108)
CLARITY UR: CLEAR
CO2 SERPL-SCNC: 25 MMOL/L (ref 21–32)
COLOR UR: ABNORMAL
CREAT SERPL-MCNC: 0.89 MG/DL (ref 0.6–1.3)
ERYTHROCYTE [DISTWIDTH] IN BLOOD BY AUTOMATED COUNT: 12.6 % (ref 11.6–15.1)
GFR SERPL CREATININE-BSD FRML MDRD: 69 ML/MIN/1.73SQ M
GLUCOSE SERPL-MCNC: 95 MG/DL (ref 65–140)
GLUCOSE UR STRIP-MCNC: NEGATIVE MG/DL
HCT VFR BLD AUTO: 36.7 % (ref 34.8–46.1)
HGB BLD-MCNC: 11.2 G/DL (ref 11.5–15.4)
HGB UR QL STRIP.AUTO: NEGATIVE
KETONES UR STRIP-MCNC: NEGATIVE MG/DL
LEUKOCYTE ESTERASE UR QL STRIP: NEGATIVE
MAGNESIUM SERPL-MCNC: 2.1 MG/DL (ref 1.6–2.6)
MCH RBC QN AUTO: 27.5 PG (ref 26.8–34.3)
MCHC RBC AUTO-ENTMCNC: 30.5 G/DL (ref 31.4–37.4)
MCV RBC AUTO: 90 FL (ref 82–98)
NITRITE UR QL STRIP: NEGATIVE
NON-SQ EPI CELLS URNS QL MICRO: ABNORMAL /HPF
P AXIS: 51 DEGREES
PH UR STRIP.AUTO: 7 [PH]
PLATELET # BLD AUTO: 175 THOUSANDS/UL (ref 149–390)
PMV BLD AUTO: 10.9 FL (ref 8.9–12.7)
POTASSIUM SERPL-SCNC: 3.8 MMOL/L (ref 3.5–5.3)
PR INTERVAL: 174 MS
PROT UR STRIP-MCNC: NEGATIVE MG/DL
QRS AXIS: -13 DEGREES
QRSD INTERVAL: 88 MS
QT INTERVAL: 384 MS
QTC INTERVAL: 453 MS
RBC # BLD AUTO: 4.08 MILLION/UL (ref 3.81–5.12)
RBC #/AREA URNS AUTO: ABNORMAL /HPF
SODIUM SERPL-SCNC: 141 MMOL/L (ref 136–145)
SP GR UR STRIP.AUTO: 1.01 (ref 1–1.03)
T WAVE AXIS: 20 DEGREES
UROBILINOGEN UR QL STRIP.AUTO: 0.2 E.U./DL
VENTRICULAR RATE: 84 BPM
WBC # BLD AUTO: 4.98 THOUSAND/UL (ref 4.31–10.16)
WBC #/AREA URNS AUTO: ABNORMAL /HPF

## 2020-10-24 PROCEDURE — 99232 SBSQ HOSP IP/OBS MODERATE 35: CPT | Performed by: NURSE PRACTITIONER

## 2020-10-24 PROCEDURE — 93010 ELECTROCARDIOGRAM REPORT: CPT | Performed by: INTERNAL MEDICINE

## 2020-10-24 PROCEDURE — 80048 BASIC METABOLIC PNL TOTAL CA: CPT | Performed by: NURSE PRACTITIONER

## 2020-10-24 PROCEDURE — G1004 CDSM NDSC: HCPCS

## 2020-10-24 PROCEDURE — 85027 COMPLETE CBC AUTOMATED: CPT | Performed by: NURSE PRACTITIONER

## 2020-10-24 PROCEDURE — 83735 ASSAY OF MAGNESIUM: CPT | Performed by: NURSE PRACTITIONER

## 2020-10-24 PROCEDURE — 72131 CT LUMBAR SPINE W/O DYE: CPT

## 2020-10-24 PROCEDURE — 81001 URINALYSIS AUTO W/SCOPE: CPT | Performed by: NURSE PRACTITIONER

## 2020-10-24 PROCEDURE — 86618 LYME DISEASE ANTIBODY: CPT | Performed by: NURSE PRACTITIONER

## 2020-10-24 RX ORDER — POLYETHYLENE GLYCOL 3350 17 G/17G
17 POWDER, FOR SOLUTION ORAL DAILY
Status: DISCONTINUED | OUTPATIENT
Start: 2020-10-25 | End: 2020-10-24

## 2020-10-24 RX ORDER — POLYETHYLENE GLYCOL 3350 17 G/17G
17 POWDER, FOR SOLUTION ORAL DAILY
Status: DISCONTINUED | OUTPATIENT
Start: 2020-10-24 | End: 2020-10-26 | Stop reason: HOSPADM

## 2020-10-24 RX ORDER — POTASSIUM CHLORIDE 20 MEQ/1
20 TABLET, EXTENDED RELEASE ORAL ONCE
Status: COMPLETED | OUTPATIENT
Start: 2020-10-24 | End: 2020-10-24

## 2020-10-24 RX ORDER — AMOXICILLIN 250 MG
2 CAPSULE ORAL
Status: DISCONTINUED | OUTPATIENT
Start: 2020-10-24 | End: 2020-10-26 | Stop reason: HOSPADM

## 2020-10-24 RX ORDER — MAGNESIUM CARB/ALUMINUM HYDROX 105-160MG
296 TABLET,CHEWABLE ORAL ONCE
Status: COMPLETED | OUTPATIENT
Start: 2020-10-24 | End: 2020-10-24

## 2020-10-24 RX ADMIN — MIDODRINE HYDROCHLORIDE 10 MG: 5 TABLET ORAL at 15:44

## 2020-10-24 RX ADMIN — POTASSIUM CHLORIDE 20 MEQ: 1500 TABLET, EXTENDED RELEASE ORAL at 09:05

## 2020-10-24 RX ADMIN — MIDODRINE HYDROCHLORIDE 10 MG: 5 TABLET ORAL at 21:42

## 2020-10-24 RX ADMIN — FLUDROCORTISONE ACETATE 0.1 MG: 0.1 TABLET ORAL at 09:07

## 2020-10-24 RX ADMIN — SUCRALFATE 1 G: 1 TABLET ORAL at 15:44

## 2020-10-24 RX ADMIN — GABAPENTIN 300 MG: 300 CAPSULE ORAL at 17:30

## 2020-10-24 RX ADMIN — MIDODRINE HYDROCHLORIDE 10 MG: 5 TABLET ORAL at 09:05

## 2020-10-24 RX ADMIN — SUCRALFATE 1 G: 1 TABLET ORAL at 06:14

## 2020-10-24 RX ADMIN — DOCUSATE SODIUM 100 MG: 100 CAPSULE, LIQUID FILLED ORAL at 09:06

## 2020-10-24 RX ADMIN — TRAZODONE HYDROCHLORIDE 150 MG: 50 TABLET ORAL at 21:43

## 2020-10-24 RX ADMIN — DULOXETINE HYDROCHLORIDE 60 MG: 30 CAPSULE, DELAYED RELEASE ORAL at 09:06

## 2020-10-24 RX ADMIN — DICLOFENAC SODIUM 2 G: 10 GEL TOPICAL at 09:08

## 2020-10-24 RX ADMIN — SODIUM CHLORIDE 50 ML/HR: 0.9 INJECTION, SOLUTION INTRAVENOUS at 13:23

## 2020-10-24 RX ADMIN — ENOXAPARIN SODIUM 40 MG: 40 INJECTION SUBCUTANEOUS at 09:05

## 2020-10-24 RX ADMIN — DOCUSATE SODIUM 100 MG: 100 CAPSULE, LIQUID FILLED ORAL at 17:30

## 2020-10-24 RX ADMIN — DOCUSATE SODIUM AND SENNOSIDES 2 TABLET: 8.6; 5 TABLET, FILM COATED ORAL at 21:41

## 2020-10-24 RX ADMIN — POLYETHYLENE GLYCOL 3350 17 G: 17 POWDER, FOR SOLUTION ORAL at 17:31

## 2020-10-24 RX ADMIN — ATORVASTATIN CALCIUM 20 MG: 40 TABLET, FILM COATED ORAL at 15:44

## 2020-10-24 RX ADMIN — QUETIAPINE FUMARATE 150 MG: 100 TABLET ORAL at 21:42

## 2020-10-24 RX ADMIN — MAGNESIUM CITRATE 296 ML: 1.75 LIQUID ORAL at 09:08

## 2020-10-24 RX ADMIN — GABAPENTIN 300 MG: 300 CAPSULE ORAL at 09:10

## 2020-10-24 RX ADMIN — LEVOTHYROXINE SODIUM 50 MCG: 50 TABLET ORAL at 06:14

## 2020-10-24 RX ADMIN — DULOXETINE HYDROCHLORIDE 60 MG: 30 CAPSULE, DELAYED RELEASE ORAL at 17:30

## 2020-10-24 RX ADMIN — OYSTER SHELL CALCIUM WITH VITAMIN D 1 TABLET: 500; 200 TABLET, FILM COATED ORAL at 09:10

## 2020-10-25 PROCEDURE — 97110 THERAPEUTIC EXERCISES: CPT

## 2020-10-25 PROCEDURE — 99254 IP/OBS CNSLTJ NEW/EST MOD 60: CPT | Performed by: INTERNAL MEDICINE

## 2020-10-25 PROCEDURE — 99232 SBSQ HOSP IP/OBS MODERATE 35: CPT | Performed by: NURSE PRACTITIONER

## 2020-10-25 RX ORDER — TRAZODONE HYDROCHLORIDE 50 MG/1
125 TABLET ORAL
Status: DISCONTINUED | OUTPATIENT
Start: 2020-10-25 | End: 2020-10-26 | Stop reason: HOSPADM

## 2020-10-25 RX ORDER — SODIUM PHOSPHATE, DIBASIC AND SODIUM PHOSPHATE, MONOBASIC 7; 19 G/133ML; G/133ML
1 ENEMA RECTAL ONCE
Status: COMPLETED | OUTPATIENT
Start: 2020-10-25 | End: 2020-10-25

## 2020-10-25 RX ADMIN — QUETIAPINE FUMARATE 125 MG: 100 TABLET ORAL at 21:20

## 2020-10-25 RX ADMIN — ACETAMINOPHEN 650 MG: 325 TABLET, FILM COATED ORAL at 19:29

## 2020-10-25 RX ADMIN — DICLOFENAC SODIUM 2 G: 10 GEL TOPICAL at 21:20

## 2020-10-25 RX ADMIN — GABAPENTIN 300 MG: 300 CAPSULE ORAL at 08:45

## 2020-10-25 RX ADMIN — SODIUM CHLORIDE 50 ML/HR: 0.9 INJECTION, SOLUTION INTRAVENOUS at 06:25

## 2020-10-25 RX ADMIN — POLYETHYLENE GLYCOL 3350, SODIUM SULFATE ANHYDROUS, SODIUM BICARBONATE, SODIUM CHLORIDE, POTASSIUM CHLORIDE 4000 ML: 236; 22.74; 6.74; 5.86; 2.97 POWDER, FOR SOLUTION ORAL at 13:54

## 2020-10-25 RX ADMIN — POLYETHYLENE GLYCOL 3350, SODIUM SULFATE ANHYDROUS, SODIUM BICARBONATE, SODIUM CHLORIDE, POTASSIUM CHLORIDE 4000 ML: 236; 22.74; 6.74; 5.86; 2.97 POWDER, FOR SOLUTION ORAL at 13:04

## 2020-10-25 RX ADMIN — DOCUSATE SODIUM 100 MG: 100 CAPSULE, LIQUID FILLED ORAL at 18:12

## 2020-10-25 RX ADMIN — ATORVASTATIN CALCIUM 20 MG: 40 TABLET, FILM COATED ORAL at 18:12

## 2020-10-25 RX ADMIN — DICLOFENAC SODIUM 2 G: 10 GEL TOPICAL at 13:57

## 2020-10-25 RX ADMIN — DOCUSATE SODIUM AND SENNOSIDES 2 TABLET: 8.6; 5 TABLET, FILM COATED ORAL at 21:21

## 2020-10-25 RX ADMIN — ENOXAPARIN SODIUM 40 MG: 40 INJECTION SUBCUTANEOUS at 08:45

## 2020-10-25 RX ADMIN — SUCRALFATE 1 G: 1 TABLET ORAL at 06:08

## 2020-10-25 RX ADMIN — OYSTER SHELL CALCIUM WITH VITAMIN D 1 TABLET: 500; 200 TABLET, FILM COATED ORAL at 08:44

## 2020-10-25 RX ADMIN — SODIUM PHOSPHATE 1 ENEMA: 7; 19 ENEMA RECTAL at 13:03

## 2020-10-25 RX ADMIN — DICLOFENAC SODIUM 2 G: 10 GEL TOPICAL at 08:44

## 2020-10-25 RX ADMIN — DULOXETINE HYDROCHLORIDE 60 MG: 30 CAPSULE, DELAYED RELEASE ORAL at 08:45

## 2020-10-25 RX ADMIN — DOCUSATE SODIUM 100 MG: 100 CAPSULE, LIQUID FILLED ORAL at 08:45

## 2020-10-25 RX ADMIN — MIDODRINE HYDROCHLORIDE 10 MG: 5 TABLET ORAL at 08:45

## 2020-10-25 RX ADMIN — SUCRALFATE 1 G: 1 TABLET ORAL at 18:12

## 2020-10-25 RX ADMIN — LEVOTHYROXINE SODIUM 50 MCG: 50 TABLET ORAL at 06:08

## 2020-10-25 RX ADMIN — TRAZODONE HYDROCHLORIDE 125 MG: 50 TABLET ORAL at 21:21

## 2020-10-25 RX ADMIN — GABAPENTIN 300 MG: 300 CAPSULE ORAL at 18:12

## 2020-10-25 RX ADMIN — DULOXETINE HYDROCHLORIDE 60 MG: 30 CAPSULE, DELAYED RELEASE ORAL at 18:12

## 2020-10-25 RX ADMIN — POLYETHYLENE GLYCOL 3350 17 G: 17 POWDER, FOR SOLUTION ORAL at 08:46

## 2020-10-25 RX ADMIN — FLUDROCORTISONE ACETATE 0.1 MG: 0.1 TABLET ORAL at 08:45

## 2020-10-26 VITALS
TEMPERATURE: 97.3 F | HEIGHT: 64 IN | OXYGEN SATURATION: 94 % | HEART RATE: 67 BPM | DIASTOLIC BLOOD PRESSURE: 82 MMHG | SYSTOLIC BLOOD PRESSURE: 129 MMHG | RESPIRATION RATE: 18 BRPM | BODY MASS INDEX: 28.94 KG/M2 | WEIGHT: 169.53 LBS

## 2020-10-26 LAB
ANION GAP SERPL CALCULATED.3IONS-SCNC: 6 MMOL/L (ref 4–13)
BUN SERPL-MCNC: 7 MG/DL (ref 5–25)
CALCIUM SERPL-MCNC: 8.2 MG/DL (ref 8.3–10.1)
CHLORIDE SERPL-SCNC: 109 MMOL/L (ref 100–108)
CO2 SERPL-SCNC: 29 MMOL/L (ref 21–32)
CREAT SERPL-MCNC: 0.79 MG/DL (ref 0.6–1.3)
GFR SERPL CREATININE-BSD FRML MDRD: 79 ML/MIN/1.73SQ M
GLUCOSE SERPL-MCNC: 93 MG/DL (ref 65–140)
MAGNESIUM SERPL-MCNC: 2 MG/DL (ref 1.6–2.6)
POTASSIUM SERPL-SCNC: 3.4 MMOL/L (ref 3.5–5.3)
SODIUM SERPL-SCNC: 144 MMOL/L (ref 136–145)

## 2020-10-26 PROCEDURE — 99232 SBSQ HOSP IP/OBS MODERATE 35: CPT | Performed by: PHYSICIAN ASSISTANT

## 2020-10-26 PROCEDURE — 80048 BASIC METABOLIC PNL TOTAL CA: CPT | Performed by: NURSE PRACTITIONER

## 2020-10-26 PROCEDURE — 83735 ASSAY OF MAGNESIUM: CPT | Performed by: NURSE PRACTITIONER

## 2020-10-26 PROCEDURE — 99239 HOSP IP/OBS DSCHRG MGMT >30: CPT | Performed by: NURSE PRACTITIONER

## 2020-10-26 PROCEDURE — 97110 THERAPEUTIC EXERCISES: CPT

## 2020-10-26 PROCEDURE — 97116 GAIT TRAINING THERAPY: CPT

## 2020-10-26 RX ORDER — FLUDROCORTISONE ACETATE 0.1 MG/1
0.1 TABLET ORAL DAILY
Qty: 30 TABLET | Refills: 1 | Status: SHIPPED | OUTPATIENT
Start: 2020-10-27 | End: 2021-02-25

## 2020-10-26 RX ORDER — POTASSIUM CHLORIDE 20 MEQ/1
40 TABLET, EXTENDED RELEASE ORAL ONCE
Status: COMPLETED | OUTPATIENT
Start: 2020-10-26 | End: 2020-10-26

## 2020-10-26 RX ORDER — QUETIAPINE FUMARATE 100 MG/1
100 TABLET, FILM COATED ORAL
Qty: 45 TABLET | Refills: 0
Start: 2020-10-26 | End: 2021-04-08 | Stop reason: SDUPTHER

## 2020-10-26 RX ORDER — QUETIAPINE FUMARATE 25 MG/1
25 TABLET, FILM COATED ORAL
Qty: 30 TABLET | Refills: 0 | Status: SHIPPED | OUTPATIENT
Start: 2020-10-26 | End: 2020-12-29 | Stop reason: DRUGHIGH

## 2020-10-26 RX ORDER — TRAZODONE HYDROCHLORIDE 100 MG/1
100 TABLET ORAL
Qty: 30 TABLET | Refills: 0 | Status: SHIPPED | OUTPATIENT
Start: 2020-10-26 | End: 2020-11-25 | Stop reason: SDUPTHER

## 2020-10-26 RX ADMIN — ACETAMINOPHEN 650 MG: 325 TABLET, FILM COATED ORAL at 09:15

## 2020-10-26 RX ADMIN — DICLOFENAC SODIUM 2 G: 10 GEL TOPICAL at 12:26

## 2020-10-26 RX ADMIN — GABAPENTIN 300 MG: 300 CAPSULE ORAL at 09:15

## 2020-10-26 RX ADMIN — SUCRALFATE 1 G: 1 TABLET ORAL at 06:34

## 2020-10-26 RX ADMIN — FLUDROCORTISONE ACETATE 0.1 MG: 0.1 TABLET ORAL at 09:16

## 2020-10-26 RX ADMIN — OYSTER SHELL CALCIUM WITH VITAMIN D 1 TABLET: 500; 200 TABLET, FILM COATED ORAL at 09:15

## 2020-10-26 RX ADMIN — ALUMINUM HYDROXIDE, MAGNESIUM HYDROXIDE, AND SIMETHICONE 30 ML: 200; 200; 20 SUSPENSION ORAL at 10:49

## 2020-10-26 RX ADMIN — DOCUSATE SODIUM 100 MG: 100 CAPSULE, LIQUID FILLED ORAL at 09:15

## 2020-10-26 RX ADMIN — DICLOFENAC SODIUM 2 G: 10 GEL TOPICAL at 09:16

## 2020-10-26 RX ADMIN — ENOXAPARIN SODIUM 40 MG: 40 INJECTION SUBCUTANEOUS at 09:16

## 2020-10-26 RX ADMIN — LEVOTHYROXINE SODIUM 50 MCG: 50 TABLET ORAL at 06:34

## 2020-10-26 RX ADMIN — DULOXETINE HYDROCHLORIDE 60 MG: 30 CAPSULE, DELAYED RELEASE ORAL at 09:15

## 2020-10-26 RX ADMIN — MIDODRINE HYDROCHLORIDE 10 MG: 5 TABLET ORAL at 09:18

## 2020-10-26 RX ADMIN — POTASSIUM CHLORIDE 40 MEQ: 1500 TABLET, EXTENDED RELEASE ORAL at 09:15

## 2020-10-26 RX ADMIN — SODIUM CHLORIDE 50 ML/HR: 0.9 INJECTION, SOLUTION INTRAVENOUS at 01:58

## 2020-10-27 ENCOUNTER — TRANSITIONAL CARE MANAGEMENT (OUTPATIENT)
Dept: FAMILY MEDICINE CLINIC | Facility: CLINIC | Age: 65
End: 2020-10-27

## 2020-10-27 LAB — B BURGDOR IGG+IGM SER-ACNC: 20

## 2020-10-29 ENCOUNTER — OFFICE VISIT (OUTPATIENT)
Dept: FAMILY MEDICINE CLINIC | Facility: CLINIC | Age: 65
End: 2020-10-29
Payer: COMMERCIAL

## 2020-10-29 VITALS
RESPIRATION RATE: 16 BRPM | WEIGHT: 169 LBS | BODY MASS INDEX: 28.85 KG/M2 | DIASTOLIC BLOOD PRESSURE: 70 MMHG | TEMPERATURE: 97.1 F | SYSTOLIC BLOOD PRESSURE: 124 MMHG | HEART RATE: 84 BPM | HEIGHT: 64 IN

## 2020-10-29 DIAGNOSIS — R55 SYNCOPE, UNSPECIFIED SYNCOPE TYPE: Primary | ICD-10-CM

## 2020-10-29 DIAGNOSIS — K20.90 ESOPHAGITIS: ICD-10-CM

## 2020-10-29 DIAGNOSIS — Z11.59 SPECIAL SCREENING EXAMINATION FOR VIRAL DISEASE: ICD-10-CM

## 2020-10-29 DIAGNOSIS — E03.9 ADULT HYPOTHYROIDISM: ICD-10-CM

## 2020-10-29 DIAGNOSIS — M54.16 RADICULOPATHY OF LUMBAR REGION: Chronic | ICD-10-CM

## 2020-10-29 DIAGNOSIS — F33.1 MODERATE EPISODE OF RECURRENT MAJOR DEPRESSIVE DISORDER (HCC): ICD-10-CM

## 2020-10-29 DIAGNOSIS — I95.1 AUTONOMIC ORTHOSTATIC HYPOTENSION: ICD-10-CM

## 2020-10-29 PROCEDURE — 99214 OFFICE O/P EST MOD 30 MIN: CPT | Performed by: NURSE PRACTITIONER

## 2020-10-29 PROCEDURE — 3078F DIAST BP <80 MM HG: CPT | Performed by: NURSE PRACTITIONER

## 2020-10-29 PROCEDURE — U0003 INFECTIOUS AGENT DETECTION BY NUCLEIC ACID (DNA OR RNA); SEVERE ACUTE RESPIRATORY SYNDROME CORONAVIRUS 2 (SARS-COV-2) (CORONAVIRUS DISEASE [COVID-19]), AMPLIFIED PROBE TECHNIQUE, MAKING USE OF HIGH THROUGHPUT TECHNOLOGIES AS DESCRIBED BY CMS-2020-01-R: HCPCS | Performed by: INTERNAL MEDICINE

## 2020-10-29 PROCEDURE — 1111F DSCHRG MED/CURRENT MED MERGE: CPT | Performed by: NURSE PRACTITIONER

## 2020-10-29 PROCEDURE — 3008F BODY MASS INDEX DOCD: CPT | Performed by: NURSE PRACTITIONER

## 2020-10-29 PROCEDURE — 3074F SYST BP LT 130 MM HG: CPT | Performed by: NURSE PRACTITIONER

## 2020-10-29 RX ORDER — PANTOPRAZOLE SODIUM 40 MG/1
40 TABLET, DELAYED RELEASE ORAL 2 TIMES DAILY
COMMUNITY
End: 2020-12-16 | Stop reason: SDUPTHER

## 2020-10-31 LAB — SARS-COV-2 RNA SPEC QL NAA+PROBE: NOT DETECTED

## 2020-11-03 RX ORDER — DIPHENHYDRAMINE HCL 50 MG
50 CAPSULE ORAL EVERY 6 HOURS PRN
COMMUNITY
End: 2021-07-20 | Stop reason: HOSPADM

## 2020-11-04 ENCOUNTER — ANESTHESIA (OUTPATIENT)
Dept: GASTROENTEROLOGY | Facility: AMBULARY SURGERY CENTER | Age: 65
End: 2020-11-04

## 2020-11-04 ENCOUNTER — HOSPITAL ENCOUNTER (OUTPATIENT)
Dept: GASTROENTEROLOGY | Facility: AMBULARY SURGERY CENTER | Age: 65
Setting detail: OUTPATIENT SURGERY
Discharge: HOME | End: 2020-11-04
Attending: INTERNAL MEDICINE | Admitting: INTERNAL MEDICINE
Payer: COMMERCIAL

## 2020-11-04 ENCOUNTER — ANESTHESIA EVENT (OUTPATIENT)
Dept: GASTROENTEROLOGY | Facility: AMBULARY SURGERY CENTER | Age: 65
End: 2020-11-04

## 2020-11-04 VITALS
TEMPERATURE: 96.4 F | BODY MASS INDEX: 27.83 KG/M2 | WEIGHT: 163 LBS | RESPIRATION RATE: 18 BRPM | DIASTOLIC BLOOD PRESSURE: 63 MMHG | SYSTOLIC BLOOD PRESSURE: 133 MMHG | HEIGHT: 64 IN | OXYGEN SATURATION: 96 % | HEART RATE: 78 BPM

## 2020-11-04 VITALS — HEART RATE: 80 BPM

## 2020-11-04 DIAGNOSIS — R13.19 ESOPHAGEAL DYSPHAGIA: ICD-10-CM

## 2020-11-04 DIAGNOSIS — K21.9 GASTROESOPHAGEAL REFLUX DISEASE WITHOUT ESOPHAGITIS: ICD-10-CM

## 2020-11-04 PROBLEM — J45.909 MILD ASTHMA: Status: ACTIVE | Noted: 2020-11-04

## 2020-11-04 PROCEDURE — 43239 EGD BIOPSY SINGLE/MULTIPLE: CPT | Performed by: INTERNAL MEDICINE

## 2020-11-04 PROCEDURE — 88305 TISSUE EXAM BY PATHOLOGIST: CPT | Performed by: PATHOLOGY

## 2020-11-04 RX ORDER — SODIUM CHLORIDE, SODIUM LACTATE, POTASSIUM CHLORIDE, CALCIUM CHLORIDE 600; 310; 30; 20 MG/100ML; MG/100ML; MG/100ML; MG/100ML
75 INJECTION, SOLUTION INTRAVENOUS CONTINUOUS
Status: DISCONTINUED | OUTPATIENT
Start: 2020-11-04 | End: 2020-11-08 | Stop reason: HOSPADM

## 2020-11-04 RX ORDER — PROPOFOL 10 MG/ML
INJECTION, EMULSION INTRAVENOUS AS NEEDED
Status: DISCONTINUED | OUTPATIENT
Start: 2020-11-04 | End: 2020-11-04

## 2020-11-04 RX ADMIN — LIDOCAINE HYDROCHLORIDE 100 MG: 20 INJECTION, SOLUTION INTRAVENOUS at 11:15

## 2020-11-04 RX ADMIN — PROPOFOL 100 MG: 10 INJECTION, EMULSION INTRAVENOUS at 11:19

## 2020-11-04 RX ADMIN — PROPOFOL 100 MG: 10 INJECTION, EMULSION INTRAVENOUS at 11:15

## 2020-11-04 RX ADMIN — SODIUM CHLORIDE, SODIUM LACTATE, POTASSIUM CHLORIDE, AND CALCIUM CHLORIDE: .6; .31; .03; .02 INJECTION, SOLUTION INTRAVENOUS at 11:15

## 2020-11-24 ENCOUNTER — TELEPHONE (OUTPATIENT)
Dept: PSYCHIATRY | Facility: CLINIC | Age: 65
End: 2020-11-24

## 2020-11-25 DIAGNOSIS — F33.41 MAJOR DEPRESSIVE DISORDER, RECURRENT, IN PARTIAL REMISSION (HCC): ICD-10-CM

## 2020-11-25 RX ORDER — TRAZODONE HYDROCHLORIDE 100 MG/1
100 TABLET ORAL
Qty: 30 TABLET | Refills: 0 | Status: SHIPPED | OUTPATIENT
Start: 2020-11-25 | End: 2020-12-23 | Stop reason: SDUPTHER

## 2020-11-30 ENCOUNTER — EVALUATION (OUTPATIENT)
Dept: PHYSICAL THERAPY | Facility: CLINIC | Age: 65
End: 2020-11-30
Payer: COMMERCIAL

## 2020-11-30 DIAGNOSIS — R55 SYNCOPE: ICD-10-CM

## 2020-11-30 DIAGNOSIS — R55 SYNCOPE, UNSPECIFIED SYNCOPE TYPE: ICD-10-CM

## 2020-11-30 PROCEDURE — 97163 PT EVAL HIGH COMPLEX 45 MIN: CPT | Performed by: PHYSICAL THERAPIST

## 2020-12-01 ENCOUNTER — OFFICE VISIT (OUTPATIENT)
Dept: FAMILY MEDICINE CLINIC | Facility: CLINIC | Age: 65
End: 2020-12-01
Payer: COMMERCIAL

## 2020-12-01 VITALS
HEART RATE: 76 BPM | TEMPERATURE: 97 F | BODY MASS INDEX: 28.51 KG/M2 | HEIGHT: 64 IN | RESPIRATION RATE: 18 BRPM | DIASTOLIC BLOOD PRESSURE: 60 MMHG | SYSTOLIC BLOOD PRESSURE: 104 MMHG | OXYGEN SATURATION: 96 % | WEIGHT: 167 LBS

## 2020-12-01 DIAGNOSIS — G43.009 MIGRAINE WITHOUT AURA AND WITHOUT STATUS MIGRAINOSUS, NOT INTRACTABLE: Primary | ICD-10-CM

## 2020-12-01 PROCEDURE — 3008F BODY MASS INDEX DOCD: CPT | Performed by: FAMILY MEDICINE

## 2020-12-01 PROCEDURE — 99213 OFFICE O/P EST LOW 20 MIN: CPT | Performed by: FAMILY MEDICINE

## 2020-12-01 PROCEDURE — 3078F DIAST BP <80 MM HG: CPT | Performed by: FAMILY MEDICINE

## 2020-12-01 PROCEDURE — 3725F SCREEN DEPRESSION PERFORMED: CPT | Performed by: FAMILY MEDICINE

## 2020-12-01 PROCEDURE — 1036F TOBACCO NON-USER: CPT | Performed by: FAMILY MEDICINE

## 2020-12-01 PROCEDURE — 3074F SYST BP LT 130 MM HG: CPT | Performed by: FAMILY MEDICINE

## 2020-12-16 ENCOUNTER — TELEPHONE (OUTPATIENT)
Dept: GASTROENTEROLOGY | Facility: AMBULARY SURGERY CENTER | Age: 65
End: 2020-12-16

## 2020-12-16 DIAGNOSIS — K21.9 GASTROESOPHAGEAL REFLUX DISEASE, UNSPECIFIED WHETHER ESOPHAGITIS PRESENT: Primary | ICD-10-CM

## 2020-12-16 RX ORDER — PANTOPRAZOLE SODIUM 40 MG/1
40 TABLET, DELAYED RELEASE ORAL DAILY
Qty: 60 TABLET | Refills: 2 | Status: SHIPPED | OUTPATIENT
Start: 2020-12-16 | End: 2021-07-21

## 2020-12-21 DIAGNOSIS — F33.41 MAJOR DEPRESSIVE DISORDER, RECURRENT, IN PARTIAL REMISSION (HCC): ICD-10-CM

## 2020-12-21 RX ORDER — TRAZODONE HYDROCHLORIDE 100 MG/1
TABLET ORAL
Qty: 30 TABLET | Refills: 0 | OUTPATIENT
Start: 2020-12-21

## 2020-12-22 ENCOUNTER — OFFICE VISIT (OUTPATIENT)
Dept: FAMILY MEDICINE CLINIC | Facility: CLINIC | Age: 65
End: 2020-12-22
Payer: COMMERCIAL

## 2020-12-22 VITALS
WEIGHT: 170 LBS | HEIGHT: 64 IN | HEART RATE: 82 BPM | RESPIRATION RATE: 16 BRPM | DIASTOLIC BLOOD PRESSURE: 70 MMHG | BODY MASS INDEX: 29.02 KG/M2 | TEMPERATURE: 98 F | SYSTOLIC BLOOD PRESSURE: 112 MMHG

## 2020-12-22 DIAGNOSIS — E03.9 ADULT HYPOTHYROIDISM: Primary | ICD-10-CM

## 2020-12-22 DIAGNOSIS — F33.1 MODERATE EPISODE OF RECURRENT MAJOR DEPRESSIVE DISORDER (HCC): ICD-10-CM

## 2020-12-22 DIAGNOSIS — E78.2 MIXED DYSLIPIDEMIA: ICD-10-CM

## 2020-12-22 DIAGNOSIS — I95.1 AUTONOMIC ORTHOSTATIC HYPOTENSION: ICD-10-CM

## 2020-12-22 PROCEDURE — 3074F SYST BP LT 130 MM HG: CPT | Performed by: NURSE PRACTITIONER

## 2020-12-22 PROCEDURE — 3008F BODY MASS INDEX DOCD: CPT | Performed by: NURSE PRACTITIONER

## 2020-12-22 PROCEDURE — 3078F DIAST BP <80 MM HG: CPT | Performed by: NURSE PRACTITIONER

## 2020-12-22 PROCEDURE — 99243 OFF/OP CNSLTJ NEW/EST LOW 30: CPT | Performed by: NURSE PRACTITIONER

## 2020-12-22 RX ORDER — CEPHALEXIN 500 MG/1
CAPSULE ORAL
COMMUNITY
Start: 2020-12-15 | End: 2021-04-29

## 2020-12-23 DIAGNOSIS — F33.41 MAJOR DEPRESSIVE DISORDER, RECURRENT, IN PARTIAL REMISSION (HCC): ICD-10-CM

## 2020-12-23 RX ORDER — TRAZODONE HYDROCHLORIDE 100 MG/1
100 TABLET ORAL
Qty: 30 TABLET | Refills: 0 | Status: SHIPPED | OUTPATIENT
Start: 2020-12-23 | End: 2021-01-20 | Stop reason: SDUPTHER

## 2020-12-29 ENCOUNTER — TELEMEDICINE (OUTPATIENT)
Dept: PSYCHIATRY | Facility: CLINIC | Age: 65
End: 2020-12-29
Payer: COMMERCIAL

## 2020-12-29 DIAGNOSIS — F33.1 MAJOR DEPRESSIVE DISORDER, RECURRENT EPISODE, MODERATE WITH ANXIOUS DISTRESS (HCC): Primary | ICD-10-CM

## 2020-12-29 DIAGNOSIS — F63.0 GAMBLING DISORDER, MODERATE: ICD-10-CM

## 2020-12-29 PROCEDURE — 1036F TOBACCO NON-USER: CPT | Performed by: NURSE PRACTITIONER

## 2020-12-29 PROCEDURE — 99215 OFFICE O/P EST HI 40 MIN: CPT | Performed by: NURSE PRACTITIONER

## 2020-12-29 RX ORDER — NALTREXONE HYDROCHLORIDE 50 MG/1
50 TABLET, FILM COATED ORAL DAILY
Qty: 30 TABLET | Refills: 2 | Status: SHIPPED | OUTPATIENT
Start: 2020-12-29 | End: 2021-01-25 | Stop reason: SDUPTHER

## 2021-01-19 DIAGNOSIS — E03.8 OTHER SPECIFIED HYPOTHYROIDISM: ICD-10-CM

## 2021-01-19 RX ORDER — LEVOTHYROXINE SODIUM 0.05 MG/1
50 TABLET ORAL DAILY
Qty: 90 TABLET | Refills: 0 | Status: SHIPPED | OUTPATIENT
Start: 2021-01-19 | End: 2021-04-22

## 2021-01-20 DIAGNOSIS — F33.41 MAJOR DEPRESSIVE DISORDER, RECURRENT, IN PARTIAL REMISSION (HCC): ICD-10-CM

## 2021-01-20 RX ORDER — TRAZODONE HYDROCHLORIDE 100 MG/1
100 TABLET ORAL
Qty: 30 TABLET | Refills: 0 | Status: SHIPPED | OUTPATIENT
Start: 2021-01-20 | End: 2021-02-15 | Stop reason: SDUPTHER

## 2021-01-21 ENCOUNTER — OFFICE VISIT (OUTPATIENT)
Dept: NEUROLOGY | Facility: CLINIC | Age: 66
End: 2021-01-21
Payer: COMMERCIAL

## 2021-01-21 VITALS
HEART RATE: 106 BPM | TEMPERATURE: 96.2 F | HEIGHT: 64 IN | BODY MASS INDEX: 28.17 KG/M2 | DIASTOLIC BLOOD PRESSURE: 60 MMHG | WEIGHT: 165 LBS | SYSTOLIC BLOOD PRESSURE: 82 MMHG

## 2021-01-21 DIAGNOSIS — E86.1 HYPOTENSION DUE TO HYPOVOLEMIA: Primary | ICD-10-CM

## 2021-01-21 DIAGNOSIS — I95.1 ORTHOSTATIC HYPOTENSION: ICD-10-CM

## 2021-01-21 DIAGNOSIS — T50.905A ADVERSE EFFECT OF DRUG, INITIAL ENCOUNTER: ICD-10-CM

## 2021-01-21 DIAGNOSIS — I95.89 HYPOTENSION DUE TO HYPOVOLEMIA: Primary | ICD-10-CM

## 2021-01-21 DIAGNOSIS — Z86.69 HISTORY OF MIGRAINE: ICD-10-CM

## 2021-01-21 PROCEDURE — 3074F SYST BP LT 130 MM HG: CPT | Performed by: PSYCHIATRY & NEUROLOGY

## 2021-01-21 PROCEDURE — 3078F DIAST BP <80 MM HG: CPT | Performed by: PSYCHIATRY & NEUROLOGY

## 2021-01-21 PROCEDURE — 1036F TOBACCO NON-USER: CPT | Performed by: PSYCHIATRY & NEUROLOGY

## 2021-01-21 PROCEDURE — 99214 OFFICE O/P EST MOD 30 MIN: CPT | Performed by: PSYCHIATRY & NEUROLOGY

## 2021-01-21 PROCEDURE — 3008F BODY MASS INDEX DOCD: CPT | Performed by: PSYCHIATRY & NEUROLOGY

## 2021-01-21 RX ORDER — MIDODRINE HYDROCHLORIDE 10 MG/1
10 TABLET ORAL 3 TIMES DAILY
Qty: 90 TABLET | Refills: 1 | Status: SHIPPED | OUTPATIENT
Start: 2021-01-21 | End: 2021-03-10 | Stop reason: SDUPTHER

## 2021-01-21 NOTE — PROGRESS NOTES
Return NeuroOutpatient Note        Charissa Givens  [id number]  87 y o   1955       Fatigue, Neurologic Problem (heard loud crash in head 2x, weird smells while sleeping ), Sleeping Disturbance, Tremors, and orthostatic hypotention        History obtained from:  Patient     HPI/Subjective:    Charissa Givens is a 59 yo F with PMH of of migraines, dizziness returns as f/u  Patient was last seen in feb of 2020  She was seen for dizziness, weakness, migraines  Today she has new complaint  For past 2 weeks she has been feeling very sleepy  She feels lightheaded when she stands up  She has been on midodrine 10mg bid for past 1 5 years  When she had cervical spine fusion, she started getting orthostatic hypotension  Today her systolic went from 471 to 82 upon standing  She felt a little dizzy  She drinks about 32 oz water and a cup of coffee in a day  3 weeks ago, her new medication was naltrexone by [facility]  It is being used for pathological gambling  Patient had eye surgery 3 weeks for macula  She now gets about 3 migraines a month  She takes prn motrin and it works       Past Medical History:   Diagnosis Date    Abdominal adhesions     Anesthesia complication     difficult to wake up    Anxiety     Depression     Depression     Disease of thyroid gland     Fibromyalgia     GERD (gastroesophageal reflux disease)     History of cholecystectomy 9/7/2019    Lazy eye     resolved: 3/27/17    Melanoma ([address] ) 2010    Osteopenia     with joint pain-elevated DEV    Psychiatric disorder     Tinnitus     Wears glasses     for reading     Social History     Socioeconomic History    Marital status: /Civil Union     Spouse name: Not on file    Number of children: Not on file    Years of education: Not on file    Highest education level: Not on file   Occupational History    Not on file   Social Needs    Financial resource strain: Not on file    Food insecurity     Worry: Not on file Inability: Not on file    Transportation needs     Medical: Not on file     Non-medical: Not on file   Tobacco Use    Smoking status: Never Smoker    Smokeless tobacco: Never Used   Substance and Sexual Activity    Alcohol use: Not Currently     Frequency: Never     Binge frequency: Never    Drug use: Never    Sexual activity: Not Currently   Lifestyle    Physical activity     Days per week: Not on file     Minutes per session: Not on file    Stress: Not on file   Relationships    Social connections     Talks on phone: Not on file     Gets together: Not on file     Attends [employer] service: Not on file     Active member of club or organization: Not on file     Attends meetings of clubs or organizations: Not on file     Relationship status: Not on file    Intimate partner violence     Fear of current or ex partner: Not on file     Emotionally abused: Not on file     Physically abused: Not on file     Forced sexual activity: Not on file   Other Topics Concern    Not on file   Social History Narrative    Not on file     Family History   Problem Relation Age of Onset    Heart disease Mother     Stroke Mother 58    COPD Mother     Arthritis Mother     Hypertension Father     Kidney disease Brother         kidney transplant    No Known Problems Sister     No Known Problems Maternal Aunt     No Known Problems Maternal Uncle     No Known Problems Paternal Aunt     No Known Problems Paternal Uncle     No Known Problems Maternal Grandmother     No Known Problems Maternal Grandfather     No Known Problems Paternal Grandmother     No Known Problems Paternal Grandfather     ADD / ADHD Neg Hx     Anesthesia problems Neg Hx     Cancer Neg Hx     Clotting disorder Neg Hx     Collagen disease Neg Hx     Diabetes Neg Hx     Dislocations Neg Hx     Learning disabilities Neg Hx     Neurological problems Neg Hx     Osteoporosis Neg Hx     Rheumatologic disease Neg Hx     Scoliosis Neg Hx     Vascular Disease Neg Hx      Allergies   Allergen Reactions    Demerol [Meperidine] Lightheadedness     Reaction Date: 11Jan2006;     Sulfa Antibiotics Hives     Reaction Date: 11Jan2006;      Current Outpatient Medications on File Prior to Visit   Medication Sig Dispense Refill    [DISCONTINUED] midodrine (PROAMATINE) 10 MG tablet Take 1 tablet (10 mg total) by mouth 3 (three) times a day (Patient taking differently: Take 10 mg by mouth 2 (two) times a day ) 90 tablet 0    albuterol (PROVENTIL HFA,VENTOLIN HFA) 90 mcg/act inhaler Inhale 2 puffs every 4 (four) hours as needed for wheezing 1 Inhaler 0    atorvastatin (LIPITOR) 20 mg tablet Take 20 mg by mouth daily   0    calcium carbonate-vitamin D (OSCAL-D) 500 mg-200 units per tablet Take 1 tablet by mouth daily with breakfast 30 tablet 0    cephalexin (KEFLEX) 500 mg capsule take 1 capsule by mouth twice a day for 10 days      diphenhydrAMINE (BENADRYL) 50 mg capsule Take 50 mg by mouth every 6 (six) hours as needed for itching      docusate sodium (COLACE) 100 mg capsule Take 1 capsule (100 mg total) by mouth 2 (two) times a day 10 capsule 0    DULoxetine (CYMBALTA) 60 mg delayed release capsule Take 1 capsule (60 mg total) by mouth 2 (two) times a day 60 capsule 2    fludrocortisone (FLORINEF) 0 1 mg tablet Take 1 tablet (0 1 mg total) by mouth daily (Patient taking differently: Take 0 1 mg by mouth daily ) 30 tablet 1    gabapentin (NEURONTIN) 300 mg capsule Take 2 capsules (600 mg total) by mouth 2 (two) times a day 120 capsule 2    ipratropium-albuterol (DUO-NEB) 0 5-2 5 mg/3 mL nebulizer solution Take 1 vial (3 mL total) by nebulization 4 (four) times a day 100 mL 0    levothyroxine 50 mcg tablet Take 1 tablet (50 mcg total) by mouth daily 90 tablet 0    meclizine (ANTIVERT) 25 mg tablet take 1 tablet by mouth every 6 hours if needed  IF DIZZINESS  0    naltrexone (REVIA) 50 mg tablet Take 1 tablet (50 mg total) by mouth daily 30 tablet 2  pantoprazole (PROTONIX) 40 mg tablet Take 1 tablet (40 mg total) by mouth daily 60 tablet 2    polyethylene glycol (MIRALAX) 17 g packet Take 17 g by mouth 2 (two) times a day  0    QUEtiapine (SEROquel) 100 mg tablet Take 1 tablet (100 mg total) by mouth daily at bedtime Take in addition to 25 mg tablet for a total dose of 125 mg at bedtime 45 tablet 0    sucralfate (CARAFATE) 1 g tablet Take 1 tablet (1 g total) by mouth 4 (four) times a day (before meals and at bedtime) 120 tablet 0    traZODone (DESYREL) 100 mg tablet Take 1 tablet (100 mg total) by mouth daily at bedtime 30 tablet 0     No current facility-administered medications on file prior to visit  Review of Systems   Refer to positive review of systems in HPI     Review of Systems    Constitutional- No fever  Eyes- No visual change  ENT- Hearing normal  CV- No chest pain  Resp- No Shortness of breath  GI- No diarrhea  - Bladder normal  MS- No Arthritis   Skin- No rash  Psych- No depression  Endo- No DM  Heme- No nodes    Vitals:    01/21/21 1008 01/21/21 1021 01/21/21 1024   BP: 96/74 128/76 (!) 82/60   BP Location: Left arm Right arm Left arm   Patient Position: Sitting Sitting Standing   Cuff Size: Adult Adult Adult   Pulse: (!) 106     Temp: (!) 96 2 °F (35 7 °C)     Weight: 74 8 kg (165 lb)     Height: 5' 4" (1 626 m)         PHYSICAL EXAM:  Appearance: No Acute Distress, appears restless   Ophthalmoscopic: Disc Flat, Normal fundus  Mental status:  Orientation: Awake, Alert, and Orientedx3  Memory: Registation 3/3 Recall 3/3  Attention: normal  Knowledge: good  Language: No aphasia  Speech: No dysarthria  Cranial Nerves:  2 No Visual Defect on Confrontation, Pupils round, equal, reactive to light  3,4,6 Extraocular Movements Intact, no nystagmus  5 Facial Sensation Intact  7 No facial asymmetry  8 Intact hearing  9,10 Palate symmetric, normal gag  11 Good shoulder shrug  12 Tongue Midline  Gait: Stable  Coordination: No ataxia with finger to nose testing, and heel to shin  Sensory: Intact, Symmetric to pinprick, light touch, vibration, and joint position  Muscle Tone: Normal              Muscle exam:  Arm Right Left Leg Right Left   Deltoid 5/5 5/5 Iliopsoas 5/5 5/5   Biceps 5/5 5/5 Quads 5/5 5/5   Triceps 5/5 5/5 Hamstrings 5/5 5/5   Wrist Extension 5/5 5/5 Ankle Dorsi Flexion 5/5 5/5   Wrist Flexion 5/5 5/5 Ankle Plantar Flexion 5/5 5/5   Interossei 5/5 5/5 Ankle Eversion 5/5 5/5   APB 5/5 5/5 Ankle Inversion 5/5 5/5       Reflexes   RJ BJ TJ KJ AJ Plantars Cantrell's   Right 2+ 2+ 2+ 2+ 2+ Downgoing Not present   Left 2+ 2+ 2+ 2+ 2+ Downgoing Not present     Personal review of  Labs:                      Diagnoses and all orders for this visit:        1  Hypotension due to hypovolemia     2  History of migraine     3  Orthostatic hypotension  midodrine (PROAMATINE) 10 MG tablet   4  Adverse effect of drug, initial encounter           Today patient was orthostatic which would explain her lightheadedness  Will increase midodrine to 10mg tid  If still symptomatic, will add florinef  Discussed that there is possibility that her newly started naltrexone can cause restlessness, dizzy as well  If possible consider weaning off of this                 Total time of encounter:  30 min  More than 50% of the time was used in counseling and/or coordination of care  Extent of counseling and/or coordination of care        [facility]  [address]  [address]  [phone]

## 2021-01-25 DIAGNOSIS — F63.0 GAMBLING DISORDER, MODERATE: ICD-10-CM

## 2021-01-25 DIAGNOSIS — F33.1 MODERATE EPISODE OF RECURRENT MAJOR DEPRESSIVE DISORDER (HCC): ICD-10-CM

## 2021-01-25 DIAGNOSIS — M79.7 FIBROMYALGIA: ICD-10-CM

## 2021-01-25 RX ORDER — GABAPENTIN 300 MG/1
600 CAPSULE ORAL 2 TIMES DAILY
Qty: 120 CAPSULE | Refills: 0 | Status: SHIPPED | OUTPATIENT
Start: 2021-01-25 | End: 2021-02-15 | Stop reason: SDUPTHER

## 2021-01-25 RX ORDER — NALTREXONE HYDROCHLORIDE 50 MG/1
50 TABLET, FILM COATED ORAL DAILY
Qty: 30 TABLET | Refills: 2 | Status: SHIPPED | OUTPATIENT
Start: 2021-01-25 | End: 2021-04-29 | Stop reason: ALTCHOICE

## 2021-01-25 RX ORDER — DULOXETIN HYDROCHLORIDE 60 MG/1
60 CAPSULE, DELAYED RELEASE ORAL 2 TIMES DAILY
Qty: 60 CAPSULE | Refills: 0 | Status: SHIPPED | OUTPATIENT
Start: 2021-01-25 | End: 2021-04-08 | Stop reason: SDUPTHER

## 2021-01-30 ENCOUNTER — HOSPITAL ENCOUNTER (EMERGENCY)
Facility: HOSPITAL | Age: 66
Discharge: HOME | End: 2021-01-30
Attending: EMERGENCY MEDICINE
Payer: COMMERCIAL

## 2021-01-30 VITALS
OXYGEN SATURATION: 97 % | SYSTOLIC BLOOD PRESSURE: 118 MMHG | RESPIRATION RATE: 17 BRPM | DIASTOLIC BLOOD PRESSURE: 72 MMHG | TEMPERATURE: 97.6 F | HEART RATE: 92 BPM

## 2021-01-30 DIAGNOSIS — L08.9 WOUND INFECTION: Primary | ICD-10-CM

## 2021-01-30 DIAGNOSIS — T14.8XXA WOUND INFECTION: Primary | ICD-10-CM

## 2021-01-30 PROCEDURE — 99283 EMERGENCY DEPT VISIT LOW MDM: CPT

## 2021-01-30 PROCEDURE — 99283 EMERGENCY DEPT VISIT LOW MDM: CPT | Performed by: EMERGENCY MEDICINE

## 2021-01-30 RX ORDER — CEPHALEXIN 500 MG/1
500 CAPSULE ORAL ONCE
Status: COMPLETED | OUTPATIENT
Start: 2021-01-30 | End: 2021-01-30

## 2021-01-30 RX ORDER — CEPHALEXIN 500 MG/1
500 CAPSULE ORAL 3 TIMES DAILY
Qty: 21 CAPSULE | Refills: 0 | Status: SHIPPED | OUTPATIENT
Start: 2021-01-30 | End: 2021-02-06

## 2021-01-30 RX ADMIN — CEPHALEXIN 500 MG: 500 CAPSULE ORAL at 09:38

## 2021-01-30 NOTE — ED PROVIDER NOTES
History  Chief Complaint   Patient presents with    Leg Pain     pt has cyst in right upper posterior leg- pt had some bleeding yesterday and has 4/10 pain- was treated with abx in November for similar problem     Patient presents for wound on the back of her right leg  Wound is under mid thigh posteriorly  Patient states she was at dermatologist in November and told that she has this was placed on antibiotics for some time  Seemed to get a little better but was told that she would need excision potentially  Patient have a high surgery so she has been back to the dermatologist since CT December did feel more irritated than the other day in the shower she was scratched open and started bleeding and since then has been more irritated and painful  Using peroxide and bacitracin  History provided by:  Patient   used: No    Leg Pain  Associated symptoms: no fever        Prior to Admission Medications   Prescriptions Last Dose Informant Patient Reported? Taking?    DULoxetine (CYMBALTA) 60 mg delayed release capsule   No No   Sig: Take 1 capsule (60 mg total) by mouth 2 (two) times a day   QUEtiapine (SEROquel) 100 mg tablet   No No   Sig: Take 1 tablet (100 mg total) by mouth daily at bedtime Take in addition to 25 mg tablet for a total dose of 125 mg at bedtime   albuterol (PROVENTIL HFA,VENTOLIN HFA) 90 mcg/act inhaler   No No   Sig: Inhale 2 puffs every 4 (four) hours as needed for wheezing   atorvastatin (LIPITOR) 20 mg tablet   Yes No   Sig: Take 20 mg by mouth daily    calcium carbonate-vitamin D (OSCAL-D) 500 mg-200 units per tablet   No No   Sig: Take 1 tablet by mouth daily with breakfast   cephalexin (KEFLEX) 500 mg capsule   Yes No   Sig: take 1 capsule by mouth twice a day for 10 days   diphenhydrAMINE (BENADRYL) 50 mg capsule   Yes No   Sig: Take 50 mg by mouth every 6 (six) hours as needed for itching   docusate sodium (COLACE) 100 mg capsule   No No   Sig: Take 1 capsule (100 mg total) by mouth 2 (two) times a day   fludrocortisone (FLORINEF) 0 1 mg tablet   No No   Sig: Take 1 tablet (0 1 mg total) by mouth daily   Patient taking differently: Take 0 1 mg by mouth daily    gabapentin (NEURONTIN) 300 mg capsule   No No   Sig: Take 2 capsules (600 mg total) by mouth 2 (two) times a day   ipratropium-albuterol (DUO-NEB) 0 5-2 5 mg/3 mL nebulizer solution   No No   Sig: Take 1 vial (3 mL total) by nebulization 4 (four) times a day   levothyroxine 50 mcg tablet   No No   Sig: Take 1 tablet (50 mcg total) by mouth daily   meclizine (ANTIVERT) 25 mg tablet   Yes No   Sig: take 1 tablet by mouth every 6 hours if needed  IF DIZZINESS   midodrine (PROAMATINE) 10 MG tablet   No No   Sig: Take 1 tablet (10 mg total) by mouth 3 (three) times a day   naltrexone (REVIA) 50 mg tablet   No No   Sig: Take 1 tablet (50 mg total) by mouth daily   pantoprazole (PROTONIX) 40 mg tablet   No No   Sig: Take 1 tablet (40 mg total) by mouth daily   polyethylene glycol (MIRALAX) 17 g packet   No No   Sig: Take 17 g by mouth 2 (two) times a day   sucralfate (CARAFATE) 1 g tablet   No No   Sig: Take 1 tablet (1 g total) by mouth 4 (four) times a day (before meals and at bedtime)   traZODone (DESYREL) 100 mg tablet   No No   Sig: Take 1 tablet (100 mg total) by mouth daily at bedtime      Facility-Administered Medications: None       Past Medical History:   Diagnosis Date    Abdominal adhesions     Anesthesia complication     difficult to wake up    Anxiety     Depression     Depression     Disease of thyroid gland     Fibromyalgia     GERD (gastroesophageal reflux disease)     History of cholecystectomy 9/7/2019    Lazy eye     resolved: 3/27/17    Melanoma ([facility] [address] ) 2010    Osteopenia     with joint pain-elevated DEV    Psychiatric disorder     Tinnitus     Wears glasses     for reading       Past Surgical History:   Procedure Laterality Date    ABDOMINAL SURGERY      lysis of adhesions x 2    APPENDECTOMY      CERVICAL FUSION      CHOLECYSTECTOMY      open    DILATION AND CURETTAGE OF UTERUS      NEUROMA EXCISION Right 5/26/2017    Procedure: EXCISION MASS / FIBROMA FOOT;  Surgeon: Elizabeth Owens DPM;  Location: [address] [place] [address];  Service:     PARS PLANA VITRECTOMY W/ REPAIR OF MACULAR HOLE  12/23/2020    RIGHT OOPHORECTOMY      WISDOM TOOTH EXTRACTION      x4       Family History   Problem Relation Age of Onset    Heart disease Mother     Stroke Mother 58    COPD Mother     Arthritis Mother     Hypertension Father     Kidney disease Brother         kidney transplant    No Known Problems Sister     No Known Problems Maternal Aunt     No Known Problems Maternal Uncle     No Known Problems Paternal Aunt     No Known Problems Paternal Uncle     No Known Problems Maternal Grandmother     No Known Problems Maternal Grandfather     No Known Problems Paternal Grandmother     No Known Problems Paternal Grandfather     ADD / ADHD Neg Hx     Anesthesia problems Neg Hx     Cancer Neg Hx     Clotting disorder Neg Hx     Collagen disease Neg Hx     Diabetes Neg Hx     Dislocations Neg Hx     Learning disabilities Neg Hx     Neurological problems Neg Hx     Osteoporosis Neg Hx     Rheumatologic disease Neg Hx     Scoliosis Neg Hx     Vascular Disease Neg Hx      I have reviewed and agree with the history as documented  E-Cigarette/Vaping    E-Cigarette Use Never User      E-Cigarette/Vaping Substances    Nicotine No     THC No     CBD No     Flavoring No     Other No     Unknown No      Social History     Tobacco Use    Smoking status: Never Smoker    Smokeless tobacco: Never Used   Substance Use Topics    Alcohol use: Not Currently     Frequency: Never     Binge frequency: Never    Drug use: Never       Review of Systems   Constitutional: Negative for chills and fever  Respiratory: Negative for cough and shortness of breath      Cardiovascular: Negative for chest pain and leg swelling  Gastrointestinal: Negative for abdominal pain, nausea and vomiting  Genitourinary: Negative for difficulty urinating and dysuria  Skin: Positive for wound  Neurological: Negative for weakness, numbness and headaches  All other systems reviewed and are negative  Physical Exam  Physical Exam  Vitals signs and nursing note reviewed  Constitutional:       General: She is not in acute distress  Appearance: Normal appearance  Cardiovascular:      Rate and Rhythm: Normal rate and regular rhythm  Pulses: Normal pulses  Pulmonary:      Effort: Pulmonary effort is normal  No respiratory distress  Breath sounds: Normal breath sounds  No wheezing, rhonchi or rales  Musculoskeletal:      Right lower leg: No edema  Left lower leg: No edema  Skin:     Capillary Refill: Capillary refill takes less than 2 seconds  Neurological:      General: No focal deficit present  Mental Status: She is alert and oriented to person, place, and time  Vital Signs  ED Triage Vitals [01/30/21 0917]   Temperature Pulse Respirations Blood Pressure SpO2   97 6 °F (36 4 °C) 92 17 118/72 97 %      Temp Source Heart Rate Source Patient Position - Orthostatic VS BP Location FiO2 (%)   Tympanic -- Sitting Right arm --      Pain Score       6           Vitals:    01/30/21 0917   BP: 118/72   Pulse: 92   Patient Position - Orthostatic VS: Sitting         Visual Acuity      ED Medications  Medications   cephalexin (KEFLEX) capsule 500 mg (500 mg Oral Given 1/30/21 4280)       Diagnostic Studies  Results Reviewed     None                 No orders to display              Procedures  Procedures         ED Course                                           MDM  Number of Diagnoses or Management Options  Wound infection:   Diagnosis management comments: Pulse ox 97% on room air indicating adequate oxygenation      At this time there is not appear to be a drainable collection on exam appears to be local edema for wound infection  Will start on p o  Antibiotics and patient follow-up with her primary doctor for wound check  Patient does not have a history of MRSA and prior microbiology studies in epic are negative for MRSA  Will start patient on Keflex p o  Continue with local wound care  Amount and/or Complexity of Data Reviewed  Decide to obtain previous medical records or to obtain history from someone other than the patient: yes  Review and summarize past medical records: yes    Patient Progress  Patient progress: stable      Disposition  Final diagnoses:   Wound infection     Time reflects when diagnosis was documented in both MDM as applicable and the Disposition within this note     Time User Action Codes Description Comment    1/30/2021  9:26 AM Arya Jones, [address]  8XXA,  L08 9] Wound infection       ED Disposition     ED Disposition Condition Date/Time Comment    Discharge Stable Sat Jan 30, 2021  9:25 AM Bernarda Tamayo discharge to home/self care  Follow-up Information     Follow up With Specialties Details Why [address], MD Internal Medicine, Family Medicine In 3 days For wound re-check [address] [facility] [id number]  [phone]            Discharge Medication List as of 1/30/2021  9:27 AM      START taking these medications    Details   !! cephalexin (KEFLEX) 500 mg capsule Take 1 capsule (500 mg total) by mouth 3 (three) times a day for 7 days, Starting Sat 1/30/2021, Until Sat 2/6/2021, Normal       !! - Potential duplicate medications found  Please discuss with provider        CONTINUE these medications which have NOT CHANGED    Details   albuterol (PROVENTIL HFA,VENTOLIN HFA) 90 mcg/act inhaler Inhale 2 puffs every 4 (four) hours as needed for wheezing, Starting Mon 7/6/2020, Normal      atorvastatin (LIPITOR) 20 mg tablet Take 20 mg by mouth daily , Starting Thu 11/21/2019, Historical Med      calcium carbonate-vitamin D (OSCAL-D) 500 mg-200 units per tablet Take 1 tablet by mouth daily with breakfast, Starting Thu 10/3/2019, Print      !! cephalexin (KEFLEX) 500 mg capsule take 1 capsule by mouth twice a day for 10 days, Historical Med      diphenhydrAMINE (BENADRYL) 50 mg capsule Take 50 mg by mouth every 6 (six) hours as needed for itching, Historical Med      docusate sodium (COLACE) 100 mg capsule Take 1 capsule (100 mg total) by mouth 2 (two) times a day, Starting Fri 10/9/2020, No Print      DULoxetine (CYMBALTA) 60 mg delayed release capsule Take 1 capsule (60 mg total) by mouth 2 (two) times a day, Starting Mon 1/25/2021, Normal      fludrocortisone (FLORINEF) 0 1 mg tablet Take 1 tablet (0 1 mg total) by mouth daily, Starting Tue 10/27/2020, Normal      gabapentin (NEURONTIN) 300 mg capsule Take 2 capsules (600 mg total) by mouth 2 (two) times a day, Starting Mon 1/25/2021, Normal      ipratropium-albuterol (DUO-NEB) 0 5-2 5 mg/3 mL nebulizer solution Take 1 vial (3 mL total) by nebulization 4 (four) times a day, Starting Mon 7/6/2020, Normal      levothyroxine 50 mcg tablet Take 1 tablet (50 mcg total) by mouth daily, Starting Tue 1/19/2021, Normal      meclizine (ANTIVERT) 25 mg tablet take 1 tablet by mouth every 6 hours if needed  IF DIZZINESS, Historical Med      midodrine (PROAMATINE) 10 MG tablet Take 1 tablet (10 mg total) by mouth 3 (three) times a day, Starting Thu 1/21/2021, Normal      naltrexone (REVIA) 50 mg tablet Take 1 tablet (50 mg total) by mouth daily, Starting Mon 1/25/2021, Normal      pantoprazole (PROTONIX) 40 mg tablet Take 1 tablet (40 mg total) by mouth daily, Starting Wed 12/16/2020, Normal      polyethylene glycol (MIRALAX) 17 g packet Take 17 g by mouth 2 (two) times a day, Starting Fri 10/9/2020, No Print      QUEtiapine (SEROquel) 100 mg tablet Take 1 tablet (100 mg total) by mouth daily at bedtime Take in addition to 25 mg tablet for a total dose of 125 mg at bedtime, Starting Mon 10/26/2020, No Print sucralfate (CARAFATE) 1 g tablet Take 1 tablet (1 g total) by mouth 4 (four) times a day (before meals and at bedtime), Starting Fri 10/9/2020, Until Sun 11/8/2020, Normal      traZODone (DESYREL) 100 mg tablet Take 1 tablet (100 mg total) by mouth daily at bedtime, Starting Wed 1/20/2021, Normal       !! - Potential duplicate medications found  Please discuss with provider  No discharge procedures on file      PDMP Review       Value Time User    PDMP Reviewed  Yes 1/25/2021  7:36 PM YURI Enrique          ED Provider  Electronically Signed by           Charlotte Grimaldo DO  01/30/21 1145

## 2021-01-30 NOTE — DISCHARGE INSTRUCTIONS
Acute Wound Care   WHAT YOU NEED TO KNOW:   An acute wound is an injury that causes a break in the skin  DISCHARGE INSTRUCTIONS:   Medicines:   · NSAIDs  help decrease swelling, pain, and fever  This medicine is available with or without a doctor's order  NSAIDs can cause stomach bleeding or kidney problems in certain people  If you take blood thinner medicine, always ask your healthcare provider if NSAIDs are safe for you  Always read the medicine label and follow directions  · Acetaminophen  decreases pain and fever  It is available without a doctor's order  Ask how much to take and how often to take it  Follow directions  Acetaminophen can cause liver damage if not taken correctly  · Antibiotics  may be given to prevent or treat an infection caused by bacteria  · Take your medicine as directed  Contact your healthcare provider if you think your medicine is not helping or if you have side effects  Tell him if you are allergic to any medicine  Keep a list of the medicines, vitamins, and herbs you take  Include the amounts, and when and why you take them  Bring the list or the pill bottles to follow-up visits  Carry your medicine list with you in case of an emergency  Follow up with your healthcare provider as directed: You may need to return to have your stitches or staples removed, wound checked, or bandage changed  Write down your questions so you remember to ask them during your visits  Wound care:   · If your wound was closed with thin strips of medical tape, keep them clean and dry  The strips of medical tape will fall off on their own  Do not pull them off  · Keep the bandage clean and dry  Do not remove the bandage over your wound unless your healthcare provider says it is okay  · Wash your hands before and after you take care of your wound to prevent infection  · Clean the wound as directed  If you cannot reach the wound, have someone help you      · If you have packing, make sure all the gauze used to pack the wound is taken out and replaced as directed  Keep track of how many gauze dressings are placed inside the wound  Contact your healthcare provider if:   · You have muscle, joint, or body aches, sweating, or a fever  · You have more swelling, redness, or bleeding in your wound  · Your skin is itchy, swollen, or you have a rash  · You have questions or concerns about your condition or care  Seek care immediately if:   · You have pus or a foul odor coming from the wound  · You have sudden trouble breathing or chest pain  · Blood soaks through your bandage  © 2016 1992 [employer] is for End User's use only and may not be sold, redistributed or otherwise used for commercial purposes  All illustrations and images included in CareNotes® are the copyrighted property of [employer]  or Satish Abdalla  The above information is an  only  It is not intended as medical advice for individual conditions or treatments  Talk to your doctor, nurse or pharmacist before following any medical regimen to see if it is safe and effective for you

## 2021-02-03 ENCOUNTER — APPOINTMENT (EMERGENCY)
Dept: RADIOLOGY | Facility: HOSPITAL | Age: 66
End: 2021-02-03
Payer: COMMERCIAL

## 2021-02-03 ENCOUNTER — HOSPITAL ENCOUNTER (EMERGENCY)
Facility: HOSPITAL | Age: 66
Discharge: HOME | End: 2021-02-03
Attending: EMERGENCY MEDICINE | Admitting: EMERGENCY MEDICINE
Payer: COMMERCIAL

## 2021-02-03 ENCOUNTER — TELEMEDICINE (OUTPATIENT)
Dept: FAMILY MEDICINE CLINIC | Facility: CLINIC | Age: 66
End: 2021-02-03
Payer: COMMERCIAL

## 2021-02-03 VITALS
BODY MASS INDEX: 28.32 KG/M2 | SYSTOLIC BLOOD PRESSURE: 138 MMHG | RESPIRATION RATE: 20 BRPM | OXYGEN SATURATION: 95 % | TEMPERATURE: 99.1 F | DIASTOLIC BLOOD PRESSURE: 82 MMHG | HEART RATE: 99 BPM | WEIGHT: 165 LBS

## 2021-02-03 DIAGNOSIS — R07.9 CHEST PAIN: ICD-10-CM

## 2021-02-03 DIAGNOSIS — Z20.822 PERSON UNDER INVESTIGATION FOR COVID-19: Primary | ICD-10-CM

## 2021-02-03 DIAGNOSIS — B34.9 VIRAL ILLNESS: ICD-10-CM

## 2021-02-03 DIAGNOSIS — B34.9 VIRAL INFECTION, UNSPECIFIED: Primary | ICD-10-CM

## 2021-02-03 LAB
ALBUMIN SERPL BCP-MCNC: 2.9 G/DL (ref 3.5–5)
ALP SERPL-CCNC: 72 U/L (ref 46–116)
ALT SERPL W P-5'-P-CCNC: 23 U/L (ref 12–78)
ANION GAP SERPL CALCULATED.3IONS-SCNC: 7 MMOL/L (ref 4–13)
APTT PPP: 26 SECONDS (ref 23–37)
AST SERPL W P-5'-P-CCNC: 17 U/L (ref 5–45)
BASOPHILS # BLD AUTO: 0.02 THOUSANDS/ΜL (ref 0–0.1)
BASOPHILS NFR BLD AUTO: 0 % (ref 0–1)
BILIRUB SERPL-MCNC: 0.3 MG/DL (ref 0.2–1)
BUN SERPL-MCNC: 17 MG/DL (ref 5–25)
CALCIUM ALBUM COR SERPL-MCNC: 9.1 MG/DL (ref 8.3–10.1)
CALCIUM SERPL-MCNC: 8.2 MG/DL (ref 8.3–10.1)
CHLORIDE SERPL-SCNC: 107 MMOL/L (ref 100–108)
CO2 SERPL-SCNC: 26 MMOL/L (ref 21–32)
CREAT SERPL-MCNC: 0.91 MG/DL (ref 0.6–1.3)
EOSINOPHIL # BLD AUTO: 0.02 THOUSAND/ΜL (ref 0–0.61)
EOSINOPHIL NFR BLD AUTO: 0 % (ref 0–6)
ERYTHROCYTE [DISTWIDTH] IN BLOOD BY AUTOMATED COUNT: 13.6 % (ref 11.6–15.1)
GFR SERPL CREATININE-BSD FRML MDRD: 67 ML/MIN/1.73SQ M
GLUCOSE SERPL-MCNC: 111 MG/DL (ref 65–140)
HCT VFR BLD AUTO: 37 % (ref 34.8–46.1)
HGB BLD-MCNC: 11.2 G/DL (ref 11.5–15.4)
IMM GRANULOCYTES # BLD AUTO: 0.01 THOUSAND/UL (ref 0–0.2)
IMM GRANULOCYTES NFR BLD AUTO: 0 % (ref 0–2)
INR PPP: 1.07 (ref 0.84–1.19)
LYMPHOCYTES # BLD AUTO: 1.13 THOUSANDS/ΜL (ref 0.6–4.47)
LYMPHOCYTES NFR BLD AUTO: 15 % (ref 14–44)
MCH RBC QN AUTO: 26.5 PG (ref 26.8–34.3)
MCHC RBC AUTO-ENTMCNC: 30.3 G/DL (ref 31.4–37.4)
MCV RBC AUTO: 88 FL (ref 82–98)
MONOCYTES # BLD AUTO: 0.5 THOUSAND/ΜL (ref 0.17–1.22)
MONOCYTES NFR BLD AUTO: 7 % (ref 4–12)
NEUTROPHILS # BLD AUTO: 5.94 THOUSANDS/ΜL (ref 1.85–7.62)
NEUTS SEG NFR BLD AUTO: 78 % (ref 43–75)
NRBC BLD AUTO-RTO: 0 /100 WBCS
PLATELET # BLD AUTO: 190 THOUSANDS/UL (ref 149–390)
PMV BLD AUTO: 10.3 FL (ref 8.9–12.7)
POTASSIUM SERPL-SCNC: 4 MMOL/L (ref 3.5–5.3)
PROT SERPL-MCNC: 6.3 G/DL (ref 6.4–8.2)
PROTHROMBIN TIME: 13.8 SECONDS (ref 11.6–14.5)
RBC # BLD AUTO: 4.22 MILLION/UL (ref 3.81–5.12)
SODIUM SERPL-SCNC: 140 MMOL/L (ref 136–145)
TROPONIN I SERPL-MCNC: <0.02 NG/ML
WBC # BLD AUTO: 7.62 THOUSAND/UL (ref 4.31–10.16)

## 2021-02-03 PROCEDURE — 36415 COLL VENOUS BLD VENIPUNCTURE: CPT | Performed by: PHYSICIAN ASSISTANT

## 2021-02-03 PROCEDURE — 71045 X-RAY EXAM CHEST 1 VIEW: CPT

## 2021-02-03 PROCEDURE — 80053 COMPREHEN METABOLIC PANEL: CPT | Performed by: PHYSICIAN ASSISTANT

## 2021-02-03 PROCEDURE — 84484 ASSAY OF TROPONIN QUANT: CPT | Performed by: PHYSICIAN ASSISTANT

## 2021-02-03 PROCEDURE — 85730 THROMBOPLASTIN TIME PARTIAL: CPT | Performed by: PHYSICIAN ASSISTANT

## 2021-02-03 PROCEDURE — 99285 EMERGENCY DEPT VISIT HI MDM: CPT

## 2021-02-03 PROCEDURE — 85610 PROTHROMBIN TIME: CPT | Performed by: PHYSICIAN ASSISTANT

## 2021-02-03 PROCEDURE — 85025 COMPLETE CBC W/AUTO DIFF WBC: CPT | Performed by: PHYSICIAN ASSISTANT

## 2021-02-03 PROCEDURE — 96360 HYDRATION IV INFUSION INIT: CPT

## 2021-02-03 PROCEDURE — 87635 SARS-COV-2 COVID-19 AMP PRB: CPT | Performed by: PHYSICIAN ASSISTANT

## 2021-02-03 PROCEDURE — 93005 ELECTROCARDIOGRAM TRACING: CPT

## 2021-02-03 PROCEDURE — 99284 EMERGENCY DEPT VISIT MOD MDM: CPT | Performed by: PHYSICIAN ASSISTANT

## 2021-02-03 PROCEDURE — 99213 OFFICE O/P EST LOW 20 MIN: CPT | Performed by: NURSE PRACTITIONER

## 2021-02-03 RX ORDER — PREDNISOLONE ACETATE 10 MG/ML
SUSPENSION/ DROPS OPHTHALMIC
COMMUNITY
Start: 2020-12-24 | End: 2021-03-26

## 2021-02-03 RX ORDER — POLYMYXIN B SULFATE AND TRIMETHOPRIM 1; 10000 MG/ML; [USP'U]/ML
SOLUTION OPHTHALMIC
COMMUNITY
Start: 2020-12-24 | End: 2021-02-25 | Stop reason: ALTCHOICE

## 2021-02-03 RX ADMIN — SODIUM CHLORIDE 1000 ML: 0.9 INJECTION, SOLUTION INTRAVENOUS at 16:01

## 2021-02-03 NOTE — ED PROVIDER NOTES
History  Chief Complaint   Patient presents with    Chest Pain     Pt arrived with multiple complaints  Pt first stated she was here because "I can't stay awake "  Pt had video visit today and was told to get COVID test done at drive thru but decided to come here to be seen  Pt continues on to then c/o chest pain, abd pain, sore throat and headache  59 y/o female presenting today with generalized weakness over the past 2 week that has worsened over the past 2 days accompanied with chest pressure accompanied with cough and generalized body aches  States that she is fatigued throughout the entire day sleeping 10 hours at a time  Was told by her physician that she needs to get a COVID test performed at a drive-through however decided to come here  States she has also had mild headache, sore throat, nasal congestion without change in smell or taste  Denies unilateral weakness, confusion, nausea, vomiting, diarrhea, shortness of breath, flank pain, calf pain or swelling,fevers  Prior to Admission Medications   Prescriptions Last Dose Informant Patient Reported? Taking?    DULoxetine (CYMBALTA) 60 mg delayed release capsule   No No   Sig: Take 1 capsule (60 mg total) by mouth 2 (two) times a day   QUEtiapine (SEROquel) 100 mg tablet   No No   Sig: Take 1 tablet (100 mg total) by mouth daily at bedtime Take in addition to 25 mg tablet for a total dose of 125 mg at bedtime   albuterol (PROVENTIL HFA,VENTOLIN HFA) 90 mcg/act inhaler   No No   Sig: Inhale 2 puffs every 4 (four) hours as needed for wheezing   atorvastatin (LIPITOR) 20 mg tablet   Yes No   Sig: Take 20 mg by mouth daily    calcium carbonate-vitamin D (OSCAL-D) 500 mg-200 units per tablet   No No   Sig: Take 1 tablet by mouth daily with breakfast   cephalexin (KEFLEX) 500 mg capsule   Yes No   Sig: take 1 capsule by mouth twice a day for 10 days   cephalexin (KEFLEX) 500 mg capsule   No No   Sig: Take 1 capsule (500 mg total) by mouth 3 (three) times a day for 7 days   diphenhydrAMINE (BENADRYL) 50 mg capsule   Yes No   Sig: Take 50 mg by mouth every 6 (six) hours as needed for itching   docusate sodium (COLACE) 100 mg capsule   No No   Sig: Take 1 capsule (100 mg total) by mouth 2 (two) times a day   fludrocortisone (FLORINEF) 0 1 mg tablet   No No   Sig: Take 1 tablet (0 1 mg total) by mouth daily   Patient taking differently: Take 0 1 mg by mouth daily    gabapentin (NEURONTIN) 300 mg capsule   No No   Sig: Take 2 capsules (600 mg total) by mouth 2 (two) times a day   ipratropium-albuterol (DUO-NEB) 0 5-2 5 mg/3 mL nebulizer solution   No No   Sig: Take 1 vial (3 mL total) by nebulization 4 (four) times a day   levothyroxine 50 mcg tablet   No No   Sig: Take 1 tablet (50 mcg total) by mouth daily   meclizine (ANTIVERT) 25 mg tablet   Yes No   Sig: take 1 tablet by mouth every 6 hours if needed  IF DIZZINESS   midodrine (PROAMATINE) 10 MG tablet   No No   Sig: Take 1 tablet (10 mg total) by mouth 3 (three) times a day   naltrexone (REVIA) 50 mg tablet   No No   Sig: Take 1 tablet (50 mg total) by mouth daily   pantoprazole (PROTONIX) 40 mg tablet   No No   Sig: Take 1 tablet (40 mg total) by mouth daily   polyethylene glycol (MIRALAX) 17 g packet   No No   Sig: Take 17 g by mouth 2 (two) times a day   polymyxin b-trimethoprim (POLYTRIM) ophthalmic solution   Yes No   Sig: instill 1 drop INTO AFFECTED EYE(S) four times a day as directed BEGIN DROPS MORNING AFTER SURGERY   prednisoLONE acetate (PRED FORTE) 1 % ophthalmic suspension   Yes No   Sig: instill 1 drop INTO AFFECTED EYE(S) four times a day as directed BEGIN DROPS MORNING AFTER SURGERY   sucralfate (CARAFATE) 1 g tablet   No No   Sig: Take 1 tablet (1 g total) by mouth 4 (four) times a day (before meals and at bedtime)   traZODone (DESYREL) 100 mg tablet   No No   Sig: Take 1 tablet (100 mg total) by mouth daily at bedtime      Facility-Administered Medications: None       Past Medical History:   Diagnosis Date    Abdominal adhesions     Anesthesia complication     difficult to wake up    Anxiety     Depression     Depression     Disease of thyroid gland     Fibromyalgia     GERD (gastroesophageal reflux disease)     History of cholecystectomy 9/7/2019    Lazy eye     resolved: 3/27/17    Melanoma ([facility] [address] ) 2010    Osteopenia     with joint pain-elevated DEV    Psychiatric disorder     Tinnitus     Wears glasses     for reading       Past Surgical History:   Procedure Laterality Date    ABDOMINAL SURGERY      lysis of adhesions x 2    APPENDECTOMY      CERVICAL FUSION      CHOLECYSTECTOMY      open    DILATION AND CURETTAGE OF UTERUS      NEUROMA EXCISION Right 5/26/2017    Procedure: EXCISION MASS / FIBROMA FOOT;  Surgeon: Valente Goncalves DPM;  [facility];  Service:     PARS PLANA VITRECTOMY W/ REPAIR OF MACULAR HOLE  12/23/2020    RIGHT OOPHORECTOMY      WISDOM TOOTH EXTRACTION      x4       Family History   Problem Relation Age of Onset    Heart disease Mother     Stroke Mother 58    COPD Mother     Arthritis Mother     Hypertension Father     Kidney disease Brother         kidney transplant    No Known Problems Sister     No Known Problems Maternal Aunt     No Known Problems Maternal Uncle     No Known Problems Paternal Aunt     No Known Problems Paternal Uncle     No Known Problems Maternal Grandmother     No Known Problems Maternal Grandfather     No Known Problems Paternal Grandmother     No Known Problems Paternal Grandfather     ADD / ADHD Neg Hx     Anesthesia problems Neg Hx     Cancer Neg Hx     Clotting disorder Neg Hx     Collagen disease Neg Hx     Diabetes Neg Hx     Dislocations Neg Hx     Learning disabilities Neg Hx     Neurological problems Neg Hx     Osteoporosis Neg Hx     Rheumatologic disease Neg Hx     Scoliosis Neg Hx     Vascular Disease Neg Hx      I have reviewed and agree with the history as documented  E-Cigarette/Vaping    E-Cigarette Use Never User      E-Cigarette/Vaping Substances    Nicotine No     THC No     CBD No     Flavoring No     Other No     Unknown No      Social History     Tobacco Use    Smoking status: Never Smoker    Smokeless tobacco: Never Used   Substance Use Topics    Alcohol use: Not Currently     Frequency: Never     Binge frequency: Never    Drug use: Never       Review of Systems   Constitutional: Positive for appetite change, chills and fatigue  Negative for activity change, diaphoresis, fever and unexpected weight change  HENT: Positive for congestion and sore throat  Negative for dental problem, drooling, ear discharge, ear pain, facial swelling, hearing loss, mouth sores, nosebleeds, postnasal drip, rhinorrhea, sinus pressure, sinus pain, sneezing, tinnitus, trouble swallowing and voice change  Eyes: Negative  Respiratory: Positive for cough, chest tightness and wheezing  Negative for apnea, choking, shortness of breath and stridor  Cardiovascular: Positive for chest pain  Negative for palpitations and leg swelling  Gastrointestinal: Negative  Genitourinary: Negative  Musculoskeletal: Positive for myalgias  Negative for arthralgias, back pain, gait problem, joint swelling, neck pain and neck stiffness  Skin: Negative  Neurological: Negative  All other systems reviewed and are negative  Physical Exam  Physical Exam  Vitals signs and nursing note reviewed  Constitutional:       General: She is not in acute distress  Appearance: Normal appearance  She is well-developed and normal weight  She is not diaphoretic  Comments: Patient appears fatigued however has good strength on exam   HENT:      Head: Normocephalic and atraumatic  Right Ear: External ear normal       Left Ear: External ear normal       Nose: Nose normal       Mouth/Throat:      Pharynx: No oropharyngeal exudate     Eyes:      General: No scleral icterus  Right eye: No discharge  Left eye: No discharge  Conjunctiva/sclera: Conjunctivae normal       Pupils: Pupils are equal, round, and reactive to light  Neck:      Musculoskeletal: Normal range of motion and neck supple  Trachea: No tracheal deviation  Cardiovascular:      Rate and Rhythm: Normal rate and regular rhythm  Pulses: Normal pulses  Heart sounds: Normal heart sounds  No murmur  No friction rub  No gallop  Pulmonary:      Effort: Pulmonary effort is normal  No respiratory distress  Breath sounds: No stridor  Wheezing present  No rhonchi or rales  Comments: S PO2 is 95% indicating adequate oxygenation on room air, patient speaking full sentences without difficulty  She does diffuse wheezing in all lung fields  Chest:      Chest wall: No tenderness  Abdominal:      General: Abdomen is flat  Bowel sounds are normal  There is no distension  Palpations: Abdomen is soft  Tenderness: There is no abdominal tenderness  There is no guarding or rebound  Musculoskeletal:        Arms:       Comments: No calf swelling or asymmetries, no calf tenderness   Lymphadenopathy:      Cervical: No cervical adenopathy  Skin:     General: Skin is warm and dry  Capillary Refill: Capillary refill takes less than 2 seconds  Coloration: Skin is not jaundiced or pale  Findings: No bruising, erythema, lesion or rash  Comments: No sandpaper rash noted  No other rashes noted  Good coloration of skin  Neurological:      General: No focal deficit present  Mental Status: She is alert  Mental status is at baseline  GCS: GCS eye subscore is 4  GCS verbal subscore is 5  GCS motor subscore is 6  Cranial Nerves: Cranial nerves are intact  Sensory: Sensation is intact  Motor: Motor function is intact  Coordination: Coordination is intact  Romberg sign negative   Coordination normal  Finger-Nose-Finger Test and Heel to Shin Test normal  Rapid alternating movements normal       Gait: Gait is intact  Comments: Good strength to all extremities 5/5           Vital Signs  ED Triage Vitals [02/03/21 1420]   Temperature Pulse Respirations Blood Pressure SpO2   99 1 °F (37 3 °C) 99 20 138/82 95 %      Temp Source Heart Rate Source Patient Position - Orthostatic VS BP Location FiO2 (%)   Tympanic Monitor Lying Right arm --      Pain Score       6           Vitals:    02/03/21 1420   BP: 138/82   Pulse: 99   Patient Position - Orthostatic VS: Lying         Visual Acuity      ED Medications  Medications   sodium chloride 0 9 % bolus 1,000 mL (1,000 mL Intravenous New Bag 2/3/21 1601)       Diagnostic Studies  Results Reviewed     Procedure Component Value Units Date/Time    Novel Coronavirus Latesha Masters [[id number]] Collected: 02/03/21 1600    Lab Status:  In process Specimen: Nares from Nose Updated: 02/03/21 1605    Troponin I [[id number]]  (Normal) Collected: 02/03/21 1516    Lab Status: Final result Specimen: Blood from Arm, Left Updated: 02/03/21 1548     Troponin I <0 02 ng/mL     Comprehensive metabolic panel [[id number]]  (Abnormal) Collected: 02/03/21 1516    Lab Status: Final result Specimen: Blood from Arm, Left Updated: 02/03/21 1542     Sodium 140 mmol/L      Potassium 4 0 mmol/L      Chloride 107 mmol/L      CO2 26 mmol/L      ANION GAP 7 mmol/L      BUN 17 mg/dL      Creatinine 0 91 mg/dL      Glucose 111 mg/dL      Calcium 8 2 mg/dL      Corrected Calcium 9 1 mg/dL      AST 17 U/L      ALT 23 U/L      Alkaline Phosphatase 72 U/L      Total Protein 6 3 g/dL      Albumin 2 9 g/dL      Total Bilirubin 0 30 mg/dL      eGFR 67 ml/min/1 73sq m     Narrative:      Reji guidelines for Chronic Kidney Disease (CKD):     Stage 1 with normal or high GFR (GFR > 90 mL/min/1 73 square meters)    Stage 2 Mild CKD (GFR = 60-89 mL/min/1 73 square meters)    Stage 3A Moderate CKD (GFR = 45-59 mL/min/1 73 square meters)    Stage 3B Moderate CKD (GFR = 30-44 mL/min/1 73 square meters)    Stage 4 Severe CKD (GFR = 15-29 mL/min/1 73 square meters)    Stage 5 End Stage CKD (GFR <15 mL/min/1 73 square meters)  Note: GFR calculation is accurate only with a steady state creatinine    Protime-INR [[id number]]  (Normal) Collected: 02/03/21 1516    Lab Status: Final result Specimen: Blood from Arm, Left Updated: 02/03/21 1539     Protime 13 8 seconds      INR 1 07    APTT [[id number]]  (Normal) Collected: 02/03/21 1516    Lab Status: Final result Specimen: Blood from Arm, Left Updated: 02/03/21 1539     PTT 26 seconds     CBC and differential [[id number]]  (Abnormal) Collected: 02/03/21 1516    Lab Status: Final result Specimen: Blood from Arm, Left Updated: 02/03/21 1524     WBC 7 62 Thousand/uL      RBC 4 22 Million/uL      Hemoglobin 11 2 g/dL      Hematocrit 37 0 %      MCV 88 fL      MCH 26 5 pg      MCHC 30 3 g/dL      RDW 13 6 %      MPV 10 3 fL      Platelets 504 Thousands/uL      nRBC 0 /100 WBCs      Neutrophils Relative 78 %      Immat GRANS % 0 %      Lymphocytes Relative 15 %      Monocytes Relative 7 %      Eosinophils Relative 0 %      Basophils Relative 0 %      Neutrophils Absolute 5 94 Thousands/µL      Immature Grans Absolute 0 01 Thousand/uL      Lymphocytes Absolute 1 13 Thousands/µL      Monocytes Absolute 0 50 Thousand/µL      Eosinophils Absolute 0 02 Thousand/µL      Basophils Absolute 0 02 Thousands/µL                  XR chest 1 view portable   Final Result by Leena Miller MD (02/03 1625)      No acute cardiopulmonary disease  Workstation performed: [id number]                    Procedures  Procedures         ED Course                                           MDM  Number of Diagnoses or Management Options  Chest pain:   Person under investigation for COVID-19:   Viral illness:   Diagnosis management comments: Discussed lab work and imaging studies, will send out COVID  Encouraged Increase hydration as well as vitamin-C and vitamin-D use  Patient is informed to return to the emergency department for worsening of symptoms and was given proper education regarding their diagnosis and symptoms  Otherwise the patient is informed to follow up with their primary care doctor for re-evaluation  The patient verbalizes understanding and agrees with above assessment and plan  All questions were answered  Please Note: Fluency Direct voice recognition software may have been used in the creation of this document  Wrong words or sound a like substitutions may have occurred due to the inherent limitations of the voice software  Amount and/or Complexity of Data Reviewed  Clinical lab tests: reviewed and ordered  Tests in the radiology section of CPT®: reviewed and ordered  Review and summarize past medical records: yes  Discuss the patient with other providers: yes  Independent visualization of images, tracings, or specimens: yes        Disposition  Final diagnoses:   Person under investigation for COVID-19   Viral illness   Chest pain     Time reflects when diagnosis was documented in both MDM as applicable and the Disposition within this note     Time User Action Codes Description Comment    2/3/2021  4:35 PM Marichuy Phi Add [Z20 [id number]] Person under investigation for COVID-19     2/3/2021  4:35 PM Marichuy Phi Add [B34 [id number]] Viral illness     2/3/2021  4:35 PM Marichuy Phi Add [R07 [id number]] Chest pain       ED Disposition     ED Disposition Condition Date/Time Comment    Discharge Stable Wed Feb 3, 2021  4:35 PM Baltazar Yo discharge to home/self care              Follow-up Information     Follow up With Specialties Details Why Contact Info Additional [address] Emergency Department Emergency Medicine Go to  If symptoms worsen such as severe pain, difficulty breathing, high persistent fevers, shortness of breath, dehydration, confusion, severe headache etc, otherwise please follow up with your family doctor [address] [facility] [address] [facility] Emergency Department, Jackie Moore [place], [address], [place] Internal Medicine, Family Medicine Schedule an appointment as soon as possible for a visit in 2 days  [address]  [address] [facility] [address]  [phone]             Patient's Medications   Discharge Prescriptions    No medications on file     No discharge procedures on file      PDMP Review       Value Time User    PDMP Reviewed  Yes 1/25/2021  7:36 PM YURI Lazo          ED Provider  Electronically Signed by           Deepa Flores PA-C  02/03/21 4443

## 2021-02-03 NOTE — DISCHARGE INSTRUCTIONS
Please take vitamin C and D     COVID-19 Home Care Guidelines    Your healthcare provider and/or public health staff have evaluated you and have determined that you do not need to remain in the hospital at this time  At this time you can be isolated at home where you will be monitored by staff from your local or state health department  You should carefully follow the prevention and isolation steps below until a healthcare provider or local or state health department says that you can return to your normal activities  Stay home except to get medical care    People who are mildly ill with COVID-19 are able to isolate at home during their illness  You should restrict activities outside your home, except for getting medical care  Do not go to work, school, or public areas  Avoid using public transportation, ride-sharing, or taxis  Separate yourself from other people and animals in your home    People: As much as possible, you should stay in a specific room and away from other people in your home  Also, you should use a separate bathroom, if available  Animals: You should restrict contact with pets and other animals while you are sick with COVID-19, just like you would around other people  Although there have not been reports of pets or other animals becoming sick with COVID-19, it is still recommended that people sick with COVID-19 limit contact with animals until more information is known about the virus  When possible, have another member of your household care for your animals while you are sick  If you are sick with COVID-19, avoid contact with your pet, including petting, snuggling, being kissed or licked, and sharing food  If you must care for your pet or be around animals while you are sick, wash your hands before and after you interact with pets and wear a facemask  See COVID-19 and Animals for more information      Call ahead before visiting your doctor    If you have a medical appointment, call the healthcare provider and tell them that you have or may have COVID-19  This will help the healthcare providers office take steps to keep other people from getting infected or exposed  Wear a facemask    You should wear a facemask when you are around other people (e g , sharing a room or vehicle) or pets and before you enter a healthcare providers office  If you are not able to wear a facemask (for example, because it causes trouble breathing), then people who live with you should not stay in the same room with you, or they should wear a facemask if they enter your room  Cover your coughs and sneezes    Cover your mouth and nose with a tissue when you cough or sneeze  Throw used tissues in a lined trash can  Immediately wash your hands with soap and water for at least 20 seconds or, if soap and water are not available, clean your hands with an alcohol-based hand  that contains at least 60% alcohol  Clean your hands often    Wash your hands often with soap and water for at least 20 seconds, especially after blowing your nose, coughing, or sneezing; going to the bathroom; and before eating or preparing food  If soap and water are not readily available, use an alcohol-based hand  with at least 60% alcohol, covering all surfaces of your hands and rubbing them together until they feel dry  Soap and water are the best option if hands are visibly dirty  Avoid touching your eyes, nose, and mouth with unwashed hands  Avoid sharing personal household items    You should not share dishes, drinking glasses, cups, eating utensils, towels, or bedding with other people or pets in your home  After using these items, they should be washed thoroughly with soap and water  Clean all high-touch surfaces everyday    High touch surfaces include counters, tabletops, doorknobs, bathroom fixtures, toilets, phones, keyboards, tablets, and bedside tables   Also, clean any surfaces that may have blood, stool, or body fluids on them  Use a household cleaning spray or wipe, according to the label instructions  Labels contain instructions for safe and effective use of the cleaning product including precautions you should take when applying the product, such as wearing gloves and making sure you have good ventilation during use of the product  Monitor your symptoms    Seek prompt medical attention if your illness is worsening (e g , difficulty breathing)  Before seeking care, call your healthcare provider and tell them that you have, or are being evaluated for, COVID-19  Put on a facemask before you enter the facility  These steps will help the healthcare providers office to keep other people in the office or waiting room from getting infected or exposed  Ask your healthcare provider to call the local or [facility] health department  Persons who are placed under active monitoring or facilitated self-monitoring should follow instructions provided by their local health department or occupational health professionals, as appropriate  If you have a medical emergency and need to call 911, notify the dispatch personnel that you have, or are being evaluated for COVID-19  If possible, put on a facemask before emergency medical services arrive  Discontinuing home isolation    Patients with confirmed COVID-19 should remain under home isolation precautions until the following conditions are met:    They have had no fever for at least 24 hours (that is one full day of no fever without the use medicine that reduces fevers)  AND  other symptoms have improved (for example, when their cough or shortness of breath have improved)  AND  If had mild or moderate illness, at least 10 days have passed since their symptoms first appeared or if severe illness (needed oxygen) or immunosuppressed, at least 20 days have passed since symptoms first appeared  Patients with confirmed COVID-19 should also notify close contacts (including their workplace) and ask that they self-quarantine  Currently, close contact is defined as being within 6 feet for 15 minutes or more from the period 24 hours starting 48 hours before symptom onset to the time at which the patient went into isolation  Close contacts of patients diagnosed with COVID-19 should be instructed by the patient to self-quarantine for 14 days from the last time of their last contact with the patient       Source: [employer]

## 2021-02-03 NOTE — PROGRESS NOTES
Virtual Regular Visit      Assessment/Plan:    Problem List Items Addressed This Visit     None      Visit Diagnoses     Viral infection, unspecified    -  Primary    Relevant Orders    Novel Coronavirus (Covid-19),PCR [facility] - Collected at [facility] or [facility]        Supportive care for viral illness discussed and advised  will follow up for any worsening and no improvement  Supportive care discussed and advised  Advised to RTO for any worsening and no improvement  Follow up for no improvement and worsening of conditions  Patient advised and educated when to see immediate medical care  Reason for visit is   Chief Complaint   Patient presents with    Virtual Regular Visit        Encounter provider SHRAVAN Sexton    Provider located at  O  [address] [address]      Recent Visits  No visits were found meeting these conditions  Showing recent visits within past 7 days and meeting all other requirements     Today's Visits  Date Type Provider Dept   02/03/21 Telemedicine Jerman Sexton today's visits and meeting all other requirements     Future Appointments  No visits were found meeting these conditions  Showing future appointments within next 150 days and meeting all other requirements        The patient was identified by name and date of birth  Butch Gallagher was informed that this is a telemedicine visit and that the visit is being conducted through [employer] and patient was informed that this is not a secure, HIPAA-compliant platform  She agrees to proceed     My office door was closed  No one else was in the room  She acknowledged consent and understanding of privacy and security of the video platform  The patient has agreed to participate and understands they can discontinue the visit at any time  Patient is aware this is a billable service  Subjective  Butch Gallagher is a 60 y o  female    HPI   Patient stated that having fatigue from week and half ago and sleeping about 10 hours a day  Patient stated that progressed to bodyaches, chest tightness, sore throat, nausea and extremely tired  Feels clammy but did not check temp  Denies any known exposure to COVID-19 positive or presumed patient  Denies any fever, chills, sob, cough, running nose, headache, new loss of sense of smell and taste, congestion, vomiting and diarrhea      Past Medical History:   Diagnosis Date    Abdominal adhesions     Anesthesia complication     difficult to wake up    Anxiety     Depression     Depression     Disease of thyroid gland     Fibromyalgia     GERD (gastroesophageal reflux disease)     History of cholecystectomy 9/7/2019    Lazy eye     resolved: 3/27/17    Melanoma ([facility] [address] ) 2010    Osteopenia     with joint pain-elevated DEV    Psychiatric disorder     Tinnitus     Wears glasses     for reading       Past Surgical History:   Procedure Laterality Date    ABDOMINAL SURGERY      lysis of adhesions x 2    APPENDECTOMY      CERVICAL FUSION      CHOLECYSTECTOMY      open    DILATION AND CURETTAGE OF UTERUS      NEUROMA EXCISION Right 5/26/2017    Procedure: EXCISION MASS / FIBROMA FOOT;  Surgeon: Femi Jade DPM;  [facility]: [facility];  Service:     PARS PLANA VITRECTOMY W/ REPAIR OF MACULAR HOLE  12/23/2020    RIGHT OOPHORECTOMY      WISDOM TOOTH EXTRACTION      x4       Current Outpatient Medications   Medication Sig Dispense Refill    albuterol (PROVENTIL HFA,VENTOLIN HFA) 90 mcg/act inhaler Inhale 2 puffs every 4 (four) hours as needed for wheezing 1 Inhaler 0    atorvastatin (LIPITOR) 20 mg tablet Take 20 mg by mouth daily   0    calcium carbonate-vitamin D (OSCAL-D) 500 mg-200 units per tablet Take 1 tablet by mouth daily with breakfast 30 tablet 0    cephalexin (KEFLEX) 500 mg capsule take 1 capsule by mouth twice a day for 10 days      cephalexin (KEFLEX) 500 mg capsule Take 1 capsule (500 mg total) by mouth 3 (three) times a day for 7 days 21 capsule 0    diphenhydrAMINE (BENADRYL) 50 mg capsule Take 50 mg by mouth every 6 (six) hours as needed for itching      docusate sodium (COLACE) 100 mg capsule Take 1 capsule (100 mg total) by mouth 2 (two) times a day 10 capsule 0    DULoxetine (CYMBALTA) 60 mg delayed release capsule Take 1 capsule (60 mg total) by mouth 2 (two) times a day 60 capsule 0    fludrocortisone (FLORINEF) 0 1 mg tablet Take 1 tablet (0 1 mg total) by mouth daily (Patient taking differently: Take 0 1 mg by mouth daily ) 30 tablet 1    gabapentin (NEURONTIN) 300 mg capsule Take 2 capsules (600 mg total) by mouth 2 (two) times a day 120 capsule 0    ipratropium-albuterol (DUO-NEB) 0 5-2 5 mg/3 mL nebulizer solution Take 1 vial (3 mL total) by nebulization 4 (four) times a day 100 mL 0    levothyroxine 50 mcg tablet Take 1 tablet (50 mcg total) by mouth daily 90 tablet 0    meclizine (ANTIVERT) 25 mg tablet take 1 tablet by mouth every 6 hours if needed  IF DIZZINESS  0    midodrine (PROAMATINE) 10 MG tablet Take 1 tablet (10 mg total) by mouth 3 (three) times a day 90 tablet 1    naltrexone (REVIA) 50 mg tablet Take 1 tablet (50 mg total) by mouth daily 30 tablet 2    pantoprazole (PROTONIX) 40 mg tablet Take 1 tablet (40 mg total) by mouth daily 60 tablet 2    polyethylene glycol (MIRALAX) 17 g packet Take 17 g by mouth 2 (two) times a day  0    polymyxin b-trimethoprim (POLYTRIM) ophthalmic solution instill 1 drop INTO AFFECTED EYE(S) four times a day as directed BEGIN DROPS MORNING AFTER SURGERY      prednisoLONE acetate (PRED FORTE) 1 % ophthalmic suspension instill 1 drop INTO AFFECTED EYE(S) four times a day as directed BEGIN DROPS MORNING AFTER SURGERY      QUEtiapine (SEROquel) 100 mg tablet Take 1 tablet (100 mg total) by mouth daily at bedtime Take in addition to 25 mg tablet for a total dose of 125 mg at bedtime 45 tablet 0    sucralfate (CARAFATE) 1 g tablet Take 1 tablet (1 g total) by mouth 4 (four) times a day (before meals and at bedtime) 120 tablet 0    traZODone (DESYREL) 100 mg tablet Take 1 tablet (100 mg total) by mouth daily at bedtime 30 tablet 0     No current facility-administered medications for this visit  Allergies   Allergen Reactions    Demerol [Meperidine] Lightheadedness     Reaction Date: 11Jan2006;     Sulfa Antibiotics Hives     Reaction Date: 11Jan2006;        Review of Systems   Constitutional: Positive for fatigue  Negative for chills, diaphoresis, fever and unexpected weight change  HENT: Positive for sore throat  Negative for congestion, dental problem, drooling, ear discharge, ear pain, facial swelling, hearing loss, mouth sores, nosebleeds, postnasal drip, rhinorrhea, sinus pressure, sinus pain, sneezing, tinnitus, trouble swallowing and voice change  Respiratory: Positive for chest tightness  Negative for cough, shortness of breath and wheezing  Cardiovascular: Negative  Gastrointestinal: Positive for nausea  Negative for abdominal pain, constipation, diarrhea and vomiting  Musculoskeletal: Negative  Skin: Negative  Neurological: Negative for dizziness, light-headedness and headaches  Hematological: Negative  Video Exam    There were no vitals filed for this visit  Physical Exam  Constitutional:       General: She is not in acute distress  Appearance: She is well-developed  HENT:      Nose: Nose normal    Eyes:      Conjunctiva/sclera: Conjunctivae normal    Neck:      Musculoskeletal: Normal range of motion  Pulmonary:      Effort: Pulmonary effort is normal       Comments: No cough and distress noted on video call  Skin:     Comments: No diaphoresis noted on video call   Neurological:      Mental Status: She is alert and oriented to person, place, and time     Psychiatric:         Mood and Affect: Mood normal          Behavior: Behavior normal          Thought Content: Thought content normal          Judgment: Judgment normal           I spent 7 minutes directly with the patient during this visit      VIRTUAL VISIT DISCLAIMER    Erkia Deshpande acknowledges that she has consented to an online visit or consultation  She understands that the online visit is based solely on information provided by her, and that, in the absence of a face-to-face physical evaluation by the physician, the diagnosis she receives is both limited and provisional in terms of accuracy and completeness  This is not intended to replace a full medical face-to-face evaluation by the physician  Erika Deshpande understands and accepts these terms

## 2021-02-04 LAB — SARS-COV-2 RNA RESP QL NAA+PROBE: NEGATIVE

## 2021-02-04 NOTE — RESULT ENCOUNTER NOTE
I called Bud Hollins and let her know that her COVID-19 swab was negative  I advised her to continue social distancing procedures

## 2021-02-05 LAB
ATRIAL RATE: 86 BPM
P AXIS: 46 DEGREES
PR INTERVAL: 170 MS
QRS AXIS: -24 DEGREES
QRSD INTERVAL: 82 MS
QT INTERVAL: 372 MS
QTC INTERVAL: 445 MS
T WAVE AXIS: 10 DEGREES
VENTRICULAR RATE: 86 BPM

## 2021-02-05 PROCEDURE — 93010 ELECTROCARDIOGRAM REPORT: CPT | Performed by: INTERNAL MEDICINE

## 2021-02-08 ENCOUNTER — OFFICE VISIT (OUTPATIENT)
Dept: FAMILY MEDICINE CLINIC | Facility: CLINIC | Age: 66
End: 2021-02-08
Payer: COMMERCIAL

## 2021-02-08 ENCOUNTER — TRANSCRIBE ORDERS (OUTPATIENT)
Dept: ADMINISTRATIVE | Facility: HOSPITAL | Age: 66
End: 2021-02-08

## 2021-02-08 ENCOUNTER — HOSPITAL ENCOUNTER (OUTPATIENT)
Dept: RADIOLOGY | Facility: HOSPITAL | Age: 66
Discharge: HOME | End: 2021-02-08
Attending: INTERNAL MEDICINE
Payer: COMMERCIAL

## 2021-02-08 VITALS
HEIGHT: 64 IN | TEMPERATURE: 96.8 F | DIASTOLIC BLOOD PRESSURE: 66 MMHG | BODY MASS INDEX: 29.19 KG/M2 | HEART RATE: 110 BPM | WEIGHT: 171 LBS | SYSTOLIC BLOOD PRESSURE: 104 MMHG | OXYGEN SATURATION: 94 % | RESPIRATION RATE: 20 BRPM

## 2021-02-08 DIAGNOSIS — E03.9 ADULT HYPOTHYROIDISM: ICD-10-CM

## 2021-02-08 DIAGNOSIS — M17.0 PRIMARY OSTEOARTHRITIS OF BOTH KNEES: ICD-10-CM

## 2021-02-08 DIAGNOSIS — R53.83 FATIGUE, UNSPECIFIED TYPE: Primary | ICD-10-CM

## 2021-02-08 DIAGNOSIS — M79.10 MYALGIA: ICD-10-CM

## 2021-02-08 DIAGNOSIS — M17.0 PRIMARY OSTEOARTHRITIS OF BOTH KNEES: Primary | ICD-10-CM

## 2021-02-08 DIAGNOSIS — F33.1 MODERATE EPISODE OF RECURRENT MAJOR DEPRESSIVE DISORDER (HCC): ICD-10-CM

## 2021-02-08 LAB
CK SERPL-CCNC: 106 U/L (ref 32–182)
ERYTHROCYTE [SEDIMENTATION RATE] IN BLOOD BY WESTERGREN METHOD: 10 MM/HR (ref 0–40)

## 2021-02-08 PROCEDURE — 99213 OFFICE O/P EST LOW 20 MIN: CPT | Performed by: INTERNAL MEDICINE

## 2021-02-08 PROCEDURE — 73562 X-RAY EXAM OF KNEE 3: CPT

## 2021-02-08 NOTE — ASSESSMENT & PLAN NOTE
She feels this is well controlled and she continues to see a therapist weekly as well as her psychiatrist

## 2021-02-08 NOTE — PROGRESS NOTES
Assessment/Plan:    1  Fatigue, unspecified type  Comments:  Possibly secondary to fibromyalgia and if bloodwork is negative she will follow up with rheumatology  Orders:  -     TSH, 3rd generation with Free T4 reflex; Future  -     TSH, 3rd generation with Free T4 reflex  -     CK (with reflex to MB); Future  -     Sedimentation rate, automated; Future  -     CK (with reflex to MB)  -     Sedimentation rate, automated    2  Adult hypothyroidism  Assessment & Plan: Will check TSH to r/o undertreated hypothyroidism as a cause for her fatigue  Orders:  -     TSH, 3rd generation with Free T4 reflex; Future  -     TSH, 3rd generation with Free T4 reflex    3  Moderate episode of recurrent major depressive disorder [facility])  Assessment & Plan:  She feels this is well controlled and she continues to see a therapist weekly as well as her psychiatrist        4  Myalgia  -     CK (with reflex to MB); Future  -     Sedimentation rate, automated; Future  -     CK (with reflex to MB)  -     Sedimentation rate, automated        BMI Counseling: Body mass index is 29 35 kg/m²  The BMI is above normal  Nutrition recommendations include reducing portion sizes and decreasing overall calorie intake  Exercise recommendations include exercising 3-5 times per week  There are no Patient Instructions on file for this visit  Return if symptoms worsen or fail to improve  Subjective:      Patient ID: Mukesh Juan is a 60 y o  female  Chief Complaint   Patient presents with    Follow-up     blood work review  Jasper Padilla       "I have been so tired and all my muscles hurt "  She feels "Xu Grad" since the middle of January  There are no fever, rash  She feels like she just wants to go back to bed as soon as she gets up  She falls asleep okay, sleeps all through the night without interruption  It was even hard to get up off the toilet this morning  No other constitutional symptoms    Does have xrays to do through Dr Marcy Segura, who she just saw in follow up  She feels her symptoms of depression are currently well controlled and are not an issue  The following portions of the patient's history were reviewed and updated as appropriate: allergies, current medications, past family history, past medical history, past social history, past surgical history and problem list     Review of Systems   Constitutional: Positive for fatigue  Negative for fever  Respiratory: Negative  Cardiovascular: Negative  Musculoskeletal: Positive for myalgias           Current Outpatient Medications   Medication Sig Dispense Refill    albuterol (PROVENTIL HFA,VENTOLIN HFA) 90 mcg/act inhaler Inhale 2 puffs every 4 (four) hours as needed for wheezing 1 Inhaler 0    atorvastatin (LIPITOR) 20 mg tablet Take 20 mg by mouth daily   0    calcium carbonate-vitamin D (OSCAL-D) 500 mg-200 units per tablet Take 1 tablet by mouth daily with breakfast 30 tablet 0    diphenhydrAMINE (BENADRYL) 50 mg capsule Take 50 mg by mouth every 6 (six) hours as needed for itching      docusate sodium (COLACE) 100 mg capsule Take 1 capsule (100 mg total) by mouth 2 (two) times a day 10 capsule 0    DULoxetine (CYMBALTA) 60 mg delayed release capsule Take 1 capsule (60 mg total) by mouth 2 (two) times a day 60 capsule 0    gabapentin (NEURONTIN) 300 mg capsule Take 2 capsules (600 mg total) by mouth 2 (two) times a day 120 capsule 0    ipratropium-albuterol (DUO-NEB) 0 5-2 5 mg/3 mL nebulizer solution Take 1 vial (3 mL total) by nebulization 4 (four) times a day 100 mL 0    levothyroxine 50 mcg tablet Take 1 tablet (50 mcg total) by mouth daily 90 tablet 0    meclizine (ANTIVERT) 25 mg tablet take 1 tablet by mouth every 6 hours if needed  IF DIZZINESS  0    midodrine (PROAMATINE) 10 MG tablet Take 1 tablet (10 mg total) by mouth 3 (three) times a day 90 tablet 1    naltrexone (REVIA) 50 mg tablet Take 1 tablet (50 mg total) by mouth daily 30 tablet 2    pantoprazole (PROTONIX) 40 mg tablet Take 1 tablet (40 mg total) by mouth daily 60 tablet 2    polyethylene glycol (MIRALAX) 17 g packet Take 17 g by mouth 2 (two) times a day  0    QUEtiapine (SEROquel) 100 mg tablet Take 1 tablet (100 mg total) by mouth daily at bedtime Take in addition to 25 mg tablet for a total dose of 125 mg at bedtime 45 tablet 0    traZODone (DESYREL) 100 mg tablet Take 1 tablet (100 mg total) by mouth daily at bedtime 30 tablet 0    cephalexin (KEFLEX) 500 mg capsule take 1 capsule by mouth twice a day for 10 days      fludrocortisone (FLORINEF) 0 1 mg tablet Take 1 tablet (0 1 mg total) by mouth daily (Patient taking differently: Take 0 1 mg by mouth daily ) 30 tablet 1    polymyxin b-trimethoprim (POLYTRIM) ophthalmic solution instill 1 drop INTO AFFECTED EYE(S) four times a day as directed BEGIN DROPS MORNING AFTER SURGERY      prednisoLONE acetate (PRED FORTE) 1 % ophthalmic suspension instill 1 drop INTO AFFECTED EYE(S) four times a day as directed BEGIN DROPS MORNING AFTER SURGERY      sucralfate (CARAFATE) 1 g tablet Take 1 tablet (1 g total) by mouth 4 (four) times a day (before meals and at bedtime) 120 tablet 0     No current facility-administered medications for this visit  Objective:    /66   Pulse (!) 110   Temp (!) 96 8 °F (36 °C)   Resp 20   Ht 5' 4" (1 626 m)   Wt 77 6 kg (171 lb)   LMP  (LMP Unknown)   SpO2 94%   BMI 29 35 kg/m²        Physical Exam  Constitutional:       Appearance: Normal appearance  She is well-developed  Eyes:      Conjunctiva/sclera: Conjunctivae normal    Neck:      Musculoskeletal: Neck supple  Thyroid: No thyromegaly  Vascular: No JVD  Cardiovascular:      Rate and Rhythm: Normal rate and regular rhythm  Heart sounds: Normal heart sounds  No murmur  No friction rub  No gallop  Pulmonary:      Effort: Pulmonary effort is normal       Breath sounds: Normal breath sounds  No wheezing or rales     Lymphadenopathy: Cervical: No cervical adenopathy  Neurological:      Mental Status: She is alert                  Noel Galvan MD

## 2021-02-09 LAB — TSH SERPL DL<=0.005 MIU/L-ACNC: 2.19 UIU/ML (ref 0.45–4.5)

## 2021-02-13 ENCOUNTER — OFFICE VISIT (OUTPATIENT)
Dept: URGENT CARE | Facility: CLINIC | Age: 66
End: 2021-02-13
Payer: COMMERCIAL

## 2021-02-13 VITALS
OXYGEN SATURATION: 99 % | WEIGHT: 171 LBS | HEIGHT: 64 IN | HEART RATE: 99 BPM | RESPIRATION RATE: 16 BRPM | DIASTOLIC BLOOD PRESSURE: 70 MMHG | BODY MASS INDEX: 29.19 KG/M2 | TEMPERATURE: 98 F | SYSTOLIC BLOOD PRESSURE: 110 MMHG

## 2021-02-13 DIAGNOSIS — M79.2 NEUROPATHIC PAIN: ICD-10-CM

## 2021-02-13 DIAGNOSIS — M62.838 MUSCLE SPASM: Primary | ICD-10-CM

## 2021-02-13 PROCEDURE — 3725F SCREEN DEPRESSION PERFORMED: CPT | Performed by: PHYSICIAN ASSISTANT

## 2021-02-13 PROCEDURE — 99213 OFFICE O/P EST LOW 20 MIN: CPT | Performed by: PHYSICIAN ASSISTANT

## 2021-02-13 RX ORDER — BACLOFEN 10 MG/1
10 TABLET ORAL 3 TIMES DAILY
Qty: 15 TABLET | Refills: 0 | Status: SHIPPED | OUTPATIENT
Start: 2021-02-13 | End: 2021-02-25 | Stop reason: SDUPTHER

## 2021-02-13 RX ORDER — KETOROLAC TROMETHAMINE 30 MG/ML
30 INJECTION, SOLUTION INTRAMUSCULAR; INTRAVENOUS ONCE
Status: COMPLETED | OUTPATIENT
Start: 2021-02-13 | End: 2021-02-13

## 2021-02-13 RX ADMIN — KETOROLAC TROMETHAMINE 30 MG: 30 INJECTION, SOLUTION INTRAMUSCULAR; INTRAVENOUS at 12:23

## 2021-02-13 NOTE — PATIENT INSTRUCTIONS
Muscle Spasm   WHAT YOU NEED TO KNOW:   A muscle spasm is a sudden contraction of any muscle or group of muscles  A muscle cramp is a painful muscle spasm  Muscle cramps commonly occur after intense exercise or during pregnancy  They may also be caused by certain medications, dehydration, low calcium or magnesium levels, or another medical condition  DISCHARGE INSTRUCTIONS:   Medicines: You may need the following:  · NSAIDs  help decrease swelling and pain or fever  This medicine is available with or without a doctor's order  NSAIDs can cause stomach bleeding or kidney problems in certain people  If you take blood thinner medicine, always ask your healthcare provider if NSAIDs are safe for you  Always read the medicine label and follow directions  · Take your medicine as directed  Contact your healthcare provider if you think your medicine is not helping or if you have side effects  Tell him of her if you are allergic to any medicine  Keep a list of the medicines, vitamins, and herbs you take  Include the amounts, and when and why you take them  Bring the list or the pill bottles to follow-up visits  Carry your medicine list with you in case of an emergency  Follow up with your healthcare provider as directed: You may need other tests or treatment  You may also be referred to a physical therapist or other specialist  Write down your questions so you remember to ask them during your visits  Self-care:   · Stretch  your muscle to help relieve the cramp  It may be helpful to keep your muscle in the stretched position until the cramp is gone  · Apply heat  to help decrease pain and muscle spasms  Apply heat on the area for 20 to 30 minutes every 2 hours for as many days as directed  · Apply ice  to help decrease swelling and pain  Ice may also help prevent tissue damage  Use an ice pack, or put crushed ice in a plastic bag   Cover it with a towel and place it on your muscle for 15 to 20 minutes every hour or as directed  · Drink more liquids  to help prevent muscle cramps caused by dehydration  Sports drinks may help replace electrolytes you lose through sweat during exercise  Ask your healthcare provider how much liquid to drink each day and which liquids are best for you  · Eat healthy foods , such as fruits, vegetables, whole grains, low-fat dairy products, and lean proteins (meat, beans, and fish)  If you are pregnant, ask your healthcare provider about foods that are high in magnesium and sodium  They may help to relieve cramps during pregnancy  · Massage your muscle  to help relieve the cramp  · Take frequent deep breaths  until the cramp feels better  Lie down while you take the deep breaths so you do not get dizzy or lightheaded  Contact your healthcare provider if:   · You have signs of dehydration, such as a headache, dark yellow urine, dry eyes or mouth, or a fast heartbeat  · You have questions or concerns about your condition or care  Return to the emergency department if:   · You have warmth, swelling, or redness in the cramping muscle  · You have frequent or unrelieved muscle cramps in several different muscles  · You have muscle cramps with numbness, tingling, and burning in your hands and feet  © Copyright [address] Information is for End User's use only and may not be sold, redistributed or otherwise used for commercial purposes  All illustrations and images included in CareNotes® are the copyrighted property of A D A M , Inc  or [facility] [employer]   The above information is an  only  It is not intended as medical advice for individual conditions or treatments  Talk to your doctor, nurse or pharmacist before following any medical regimen to see if it is safe and effective for you

## 2021-02-15 ENCOUNTER — TELEMEDICINE (OUTPATIENT)
Dept: PSYCHIATRY | Facility: CLINIC | Age: 66
End: 2021-02-15
Payer: COMMERCIAL

## 2021-02-15 DIAGNOSIS — F63.0 GAMBLING DISORDER, MODERATE: ICD-10-CM

## 2021-02-15 DIAGNOSIS — F33.41 MAJOR DEPRESSIVE DISORDER, RECURRENT, IN PARTIAL REMISSION (HCC): ICD-10-CM

## 2021-02-15 DIAGNOSIS — F33.1 MAJOR DEPRESSIVE DISORDER, RECURRENT EPISODE, MODERATE WITH ANXIOUS DISTRESS (HCC): Primary | ICD-10-CM

## 2021-02-15 DIAGNOSIS — M79.7 FIBROMYALGIA: ICD-10-CM

## 2021-02-15 PROCEDURE — 99214 OFFICE O/P EST MOD 30 MIN: CPT | Performed by: NURSE PRACTITIONER

## 2021-02-15 RX ORDER — GABAPENTIN 300 MG/1
600 CAPSULE ORAL 2 TIMES DAILY
Qty: 120 CAPSULE | Refills: 2 | Status: SHIPPED | OUTPATIENT
Start: 2021-02-23 | End: 2021-03-10

## 2021-02-15 RX ORDER — TRAZODONE HYDROCHLORIDE 100 MG/1
100 TABLET ORAL
Qty: 30 TABLET | Refills: 2 | Status: SHIPPED | OUTPATIENT
Start: 2021-02-15 | End: 2021-06-08 | Stop reason: SDUPTHER

## 2021-02-15 NOTE — PROGRESS NOTES
[facility]        NAME: Tracy Puente is a 60 y o  female  : 1955    MRN: [id number]  DATE: 2021  TIME: 1:55 PM    Assessment and Plan   Muscle spasm [M62 [id number]]  1  Muscle spasm  ketorolac (TORADOL) injection 30 mg    baclofen 10 mg tablet   2  Neuropathic pain  ketorolac (TORADOL) injection 30 mg         Patient Instructions     Discussed condition with pt  She has muscle spasm and neuropathic pain which I suspect are tied to her previous history of cervical and lumbar radiculopathy  She will be given Toradol injection and prescribed her Baclofen to help with the spasm  She should F/U with her specialist for ongoing management  Follow up with PCP in 3-5 days  Proceed to  ER if symptoms worsen  Chief Complaint     Chief Complaint   Patient presents with    Arm Pain     Pt reports of right arm pain with left leg pain started this morning   Leg Pain         History of Present Illness       Pt presents with acute pain in her right arm and her proximal inner thighs with no known injury  She reports the pain is intense and excruciating and is brought on by minimal movement  She does have history of cervical and lumbar disc issues but reports this pain is new  She reports the pain is sharp and shooting in nature  She denies any numbness  Denies any neck or back pain at this time  Review of Systems   Review of Systems   Constitutional: Negative  Respiratory: Negative  Cardiovascular: Negative  Gastrointestinal: Negative  Genitourinary: Negative  Musculoskeletal:        Right arm and B/L thigh pain   Neurological: Negative            Current Medications       Current Outpatient Medications:     albuterol (PROVENTIL HFA,VENTOLIN HFA) 90 mcg/act inhaler, Inhale 2 puffs every 4 (four) hours as needed for wheezing, Disp: 1 Inhaler, Rfl: 0    atorvastatin (LIPITOR) 20 mg tablet, Take 20 mg by mouth daily , Disp: , Rfl: 0    baclofen 10 mg tablet, Take 1 tablet (10 mg total) by mouth 3 (three) times a day, Disp: 15 tablet, Rfl: 0    calcium carbonate-vitamin D (OSCAL-D) 500 mg-200 units per tablet, Take 1 tablet by mouth daily with breakfast, Disp: 30 tablet, Rfl: 0    cephalexin (KEFLEX) 500 mg capsule, take 1 capsule by mouth twice a day for 10 days, Disp: , Rfl:     diphenhydrAMINE (BENADRYL) 50 mg capsule, Take 50 mg by mouth every 6 (six) hours as needed for itching, Disp: , Rfl:     docusate sodium (COLACE) 100 mg capsule, Take 1 capsule (100 mg total) by mouth 2 (two) times a day, Disp: 10 capsule, Rfl: 0    DULoxetine (CYMBALTA) 60 mg delayed release capsule, Take 1 capsule (60 mg total) by mouth 2 (two) times a day, Disp: 60 capsule, Rfl: 0    fludrocortisone (FLORINEF) 0 1 mg tablet, Take 1 tablet (0 1 mg total) by mouth daily (Patient taking differently: Take 0 1 mg by mouth daily ), Disp: 30 tablet, Rfl: 1    [START ON 2/23/2021] gabapentin (NEURONTIN) 300 mg capsule, Take 2 capsules (600 mg total) by mouth 2 (two) times a day, Disp: 120 capsule, Rfl: 2    ipratropium-albuterol (DUO-NEB) 0 5-2 5 mg/3 mL nebulizer solution, Take 1 vial (3 mL total) by nebulization 4 (four) times a day, Disp: 100 mL, Rfl: 0    levothyroxine 50 mcg tablet, Take 1 tablet (50 mcg total) by mouth daily, Disp: 90 tablet, Rfl: 0    meclizine (ANTIVERT) 25 mg tablet, take 1 tablet by mouth every 6 hours if needed  IF DIZZINESS, Disp: , Rfl: 0    midodrine (PROAMATINE) 10 MG tablet, Take 1 tablet (10 mg total) by mouth 3 (three) times a day, Disp: 90 tablet, Rfl: 1    naltrexone (REVIA) 50 mg tablet, Take 1 tablet (50 mg total) by mouth daily, Disp: 30 tablet, Rfl: 2    pantoprazole (PROTONIX) 40 mg tablet, Take 1 tablet (40 mg total) by mouth daily, Disp: 60 tablet, Rfl: 2    polyethylene glycol (MIRALAX) 17 g packet, Take 17 g by mouth 2 (two) times a day, Disp:  , Rfl: 0    polymyxin b-trimethoprim (POLYTRIM) ophthalmic solution, instill 1 drop INTO AFFECTED EYE(S) four times a day as directed BEGIN DROPS MORNING AFTER SURGERY, Disp: , Rfl:     prednisoLONE acetate (PRED FORTE) 1 % ophthalmic suspension, instill 1 drop INTO AFFECTED EYE(S) four times a day as directed BEGIN DROPS MORNING AFTER SURGERY, Disp: , Rfl:     QUEtiapine (SEROquel) 100 mg tablet, Take 1 tablet (100 mg total) by mouth daily at bedtime Take in addition to 25 mg tablet for a total dose of 125 mg at bedtime, Disp: 45 tablet, Rfl: 0    sucralfate (CARAFATE) 1 g tablet, Take 1 tablet (1 g total) by mouth 4 (four) times a day (before meals and at bedtime), Disp: 120 tablet, Rfl: 0    traZODone (DESYREL) 100 mg tablet, Take 1 tablet (100 mg total) by mouth daily at bedtime, Disp: 30 tablet, Rfl: 2    Current Allergies     Allergies as of 02/13/2021 - Reviewed 02/13/2021   Allergen Reaction Noted    Demerol [meperidine] Lightheadedness 01/11/2006    Sulfa antibiotics Hives 01/11/2006            The following portions of the patient's history were reviewed and updated as appropriate: allergies, current medications, past family history, past medical history, past social history, past surgical history and problem list      Past Medical History:   Diagnosis Date    Abdominal adhesions     Anesthesia complication     difficult to wake up    Anxiety     Depression     Depression     Disease of thyroid gland     Fibromyalgia     GERD (gastroesophageal reflux disease)     History of cholecystectomy 9/7/2019    Lazy eye     resolved: 3/27/17    Melanoma ([facility] [address] ) 2010    Osteopenia     with joint pain-elevated DEV    Psychiatric disorder     Tinnitus     Wears glasses     for reading       Past Surgical History:   Procedure Laterality Date    ABDOMINAL SURGERY      lysis of adhesions x 2    APPENDECTOMY      CERVICAL FUSION      CHOLECYSTECTOMY      open    DILATION AND CURETTAGE OF UTERUS      NEUROMA EXCISION Right 5/26/2017    Procedure: EXCISION MASS / FIBROMA FOOT;  Surgeon: Leydi Parisi DPM; Location: [facility];  Service:     PARS PLANA VITRECTOMY W/ REPAIR OF MACULAR HOLE  12/23/2020    RIGHT OOPHORECTOMY      WISDOM TOOTH EXTRACTION      x4       Family History   Problem Relation Age of Onset    Heart disease Mother     Stroke Mother 58    COPD Mother     Arthritis Mother     Hypertension Father     Kidney disease Brother         kidney transplant    No Known Problems Sister     No Known Problems Maternal Aunt     No Known Problems Maternal Uncle     No Known Problems Paternal Aunt     No Known Problems Paternal Uncle     No Known Problems Maternal Grandmother     No Known Problems Maternal Grandfather     No Known Problems Paternal Grandmother     No Known Problems Paternal Grandfather     ADD / ADHD Neg Hx     Anesthesia problems Neg Hx     Cancer Neg Hx     Clotting disorder Neg Hx     Collagen disease Neg Hx     Diabetes Neg Hx     Dislocations Neg Hx     Learning disabilities Neg Hx     Neurological problems Neg Hx     Osteoporosis Neg Hx     Rheumatologic disease Neg Hx     Scoliosis Neg Hx     Vascular Disease Neg Hx          Medications have been verified  Objective   /70   Pulse 99   Temp 98 °F (36 7 °C)   Resp 16   Ht 5' 4" (1 626 m)   Wt 77 6 kg (171 lb)   LMP  (LMP Unknown)   SpO2 99%   BMI 29 35 kg/m²   No LMP recorded (lmp unknown)  Patient is postmenopausal        Physical Exam     Physical Exam  Vitals signs reviewed  Constitutional:       General: She is not in acute distress  Appearance: She is well-developed  Musculoskeletal:      Comments: Exam overall limited secondary to pt discomfort  Pt has tenderness to very light palpation right upper arm as well as proximal medial thighs but no visible or palpable deformity noted  Neurological:      Mental Status: She is alert and oriented to person, place, and time

## 2021-02-15 NOTE — PSYCH
PROGRESS NOTE        [facility]      Name and Date of Birth:  Vee Keller 60 y o  1955    Date of Visit: 02/15/21    Pt was spoken to today for medication management for 30 minutes by phone because pt was unable to connect via Teams  Door to office was closed; provider was alone in office, pt aware and consented to phone session  Time spent on Epic chart review of relevant information, note composition, and after-visit plan: 5 minutes  Total time for this visit: 35 minutes      SUBJECTIVE: doing very well, going to [facility], sees a psychotherapist, has a sponsor and attending AA sober for 25 years  Wonders if she needs to take naltrexone but will continue to  Her vision has improved significantly, she drives but only on local roads  Thinking of starting a recovery house for women in [facility] with some other people  Not suicidal, doesn't know why she took extra meds  Having vivid dreams  Feels that she is in the right place right now  Is going to start physical therapy for pain  She is taking quetiapine 100 mg at HS  She denies suicidal ideation, intent or plan at present, has no suicidal ideation, intent or plan at present  She denies any auditory hallucinations and visual hallucinations, denies any other delusional thinking, denies any delusional thinking  She denies any side effects from medications    HPI ROS Appetite Changes and Sleep: normal appetite, normal sleep    Review Of Systems:      Constitutional Negative   ENT Negative   Cardiovascular Negative   Respiratory Negative   Gastrointestinal Negative   Genitourinary Negative   Musculoskeletal Negative   Integumentary Negative   Neurological Negative   Endocrine Negative   Other Symptoms Negative and None       Laboratory Results: No results found for this or any previous visit      Substance Abuse History:    Social History     Substance and Sexual Activity   Drug Use Never Family Psychiatric History:     Family History   Problem Relation Age of Onset    Heart disease Mother     Stroke Mother 58    COPD Mother     Arthritis Mother     Hypertension Father     Kidney disease Brother         kidney transplant    No Known Problems Sister     No Known Problems Maternal Aunt     No Known Problems Maternal Uncle     No Known Problems Paternal Aunt     No Known Problems Paternal Uncle     No Known Problems Maternal Grandmother     No Known Problems Maternal Grandfather     No Known Problems Paternal Grandmother     No Known Problems Paternal Grandfather     ADD / ADHD Neg Hx     Anesthesia problems Neg Hx     Cancer Neg Hx     Clotting disorder Neg Hx     Collagen disease Neg Hx     Diabetes Neg Hx     Dislocations Neg Hx     Learning disabilities Neg Hx     Neurological problems Neg Hx     Osteoporosis Neg Hx     Rheumatologic disease Neg Hx     Scoliosis Neg Hx     Vascular Disease Neg Hx        The following portions of the patient's history were reviewed and updated as appropriate: past family history, past medical history, past social history, past surgical history and problem list     Social History     Socioeconomic History    Marital status: /Civil Union     Spouse name: Not on file    Number of children: Not on file    Years of education: Not on file    Highest education level: Not on file   Occupational History    Not on file   Social Needs    Financial resource strain: Not on file    Food insecurity     Worry: Not on file     Inability: Not on file   [employer] needs     Medical: Not on file     Non-medical: Not on file   Tobacco Use    Smoking status: Never Smoker    Smokeless tobacco: Never Used   Substance and Sexual Activity    Alcohol use: Not Currently     Frequency: Never     Binge frequency: Never    Drug use: Never    Sexual activity: Not Currently   Lifestyle    Physical activity     Days per week: Not on file Minutes per session: Not on file    Stress: Not on file   Relationships    Social connections     Talks on phone: Not on file     Gets together: Not on file     Attends Yarsani service: Not on file     Active member of club or organization: Not on file     Attends meetings of clubs or organizations: Not on file     Relationship status: Not on file    Intimate partner violence     Fear of current or ex partner: Not on file     Emotionally abused: Not on file     Physically abused: Not on file     Forced sexual activity: Not on file   Other Topics Concern    Not on file   Social History Narrative    Not on file     Social History     Social History Narrative    Not on file        Social History     Tobacco History     Smoking Status  Never Smoker    Smokeless Tobacco Use  Never Used          Alcohol History     Alcohol Use Status  Not Currently          Drug Use     Drug Use Status  Never          Sexual Activity     Sexually Active  Not Currently          Activities of Daily Living    Not Asked                     OBJECTIVE:     Mental Status Evaluation:    Appearance Phone appointment, not assessed   Behavior pleasant, cooperative   Speech normal volume, normal pitch   Mood euthymic   Affect Phone appointment, not assessed   Thought Processes normal   Associations Tangential associations   Thought Content circumstantial   Perceptual Disturbances: none   Abnormal Thoughts  Risk Potential Suicidal ideation - None  Homicidal ideation - None  Potential for aggression - No   Orientation oriented to person, place, time/date and situation   Memory recent and remote memory grossly intact   Cosciousness alert and awake   Attention Span attention span and concentration are age appropriate   Intellect Not formally assessed   Insight age appropriate    Judgement good    Muscle Strength and  Gait Phone appointment, not assessed   Language no difficulty naming common objects   Fund of Knowledge displays adequate knowledge of current events   Pain none   Pain Scale 0       Assessment/Plan:       Diagnoses and all orders for this visit:    Major depressive disorder, recurrent episode, moderate with anxious distress ([address] )    Gambling disorder, moderate          Treatment Recommendations/Precautions:    Continue current medications: naltrexone 50 mg one tab po qd; duloxetine 60 mg cap po BID; quetiapine 100 mg po qHS; trazodone 100 mg po qHS    Risks/Benefits      Risks, Benefits And Possible Side Effects Of Medications:    Risks, benefits, and possible side effects of medications explained to patient and patient verbalizes understanding and agreement for treatment      Controlled Medication Discussion:     Not applicable    Psychotherapy Provided:     Individual psychotherapy provided: No

## 2021-02-19 ENCOUNTER — TELEPHONE (OUTPATIENT)
Dept: NEUROLOGY | Facility: CLINIC | Age: 66
End: 2021-02-19

## 2021-02-19 NOTE — TELEPHONE ENCOUNTER
KANDI    Called and made patient aware  Said that she forgot to mention that she has new nerve pain that is in her hands that feels like electrical shocks  Said that she was ordered MRI of cervical spine and [place] spine  She will get this done at [facility] ([facility]) so you are able to see the results

## 2021-02-19 NOTE — TELEPHONE ENCOUNTER
Patient calling in  She states she is having  nerve pain in L leg that goes from hip down  L foot feels like pins and needles  She has same pain in Upper right arm from armpit to hand  This feels like shooting pain  She states her pain is worse when lying down and often times feels tender to touch  She did go to urgent care for this over the weekend  She took baclofen for 3 days and it was not helpful for her      gabapentin 300mg caps, 2 caps BID  duloxetine 60mg tab, 1 cap BID    She is wondering if Dr Chapin Willard has any recommendations for her?         [address] to leave a detailed message

## 2021-02-20 NOTE — TELEPHONE ENCOUNTER
Will check imaging first  As for medication options, since she's already on 2 agents, we may not have further options at this point

## 2021-02-22 ENCOUNTER — TRANSCRIBE ORDERS (OUTPATIENT)
Dept: ADMINISTRATIVE | Facility: HOSPITAL | Age: 66
End: 2021-02-22

## 2021-02-22 DIAGNOSIS — M54.16 LUMBAR RADICULOPATHY: ICD-10-CM

## 2021-02-22 DIAGNOSIS — M47.812 CERVICAL SPONDYLOSIS WITHOUT MYELOPATHY: Primary | ICD-10-CM

## 2021-02-24 ENCOUNTER — APPOINTMENT (OUTPATIENT)
Dept: RADIOLOGY | Facility: CLINIC | Age: 66
End: 2021-02-24
Payer: COMMERCIAL

## 2021-02-24 ENCOUNTER — OFFICE VISIT (OUTPATIENT)
Dept: URGENT CARE | Facility: CLINIC | Age: 66
End: 2021-02-24
Payer: COMMERCIAL

## 2021-02-24 VITALS
HEART RATE: 80 BPM | TEMPERATURE: 98.5 F | HEIGHT: 64 IN | WEIGHT: 169 LBS | BODY MASS INDEX: 28.85 KG/M2 | DIASTOLIC BLOOD PRESSURE: 81 MMHG | SYSTOLIC BLOOD PRESSURE: 120 MMHG | RESPIRATION RATE: 16 BRPM | OXYGEN SATURATION: 99 %

## 2021-02-24 DIAGNOSIS — S59.901A ELBOW INJURY, RIGHT, INITIAL ENCOUNTER: ICD-10-CM

## 2021-02-24 DIAGNOSIS — S50.01XA CONTUSION OF RIGHT ELBOW, INITIAL ENCOUNTER: Primary | ICD-10-CM

## 2021-02-24 PROCEDURE — 73080 X-RAY EXAM OF ELBOW: CPT

## 2021-02-24 PROCEDURE — 99213 OFFICE O/P EST LOW 20 MIN: CPT | Performed by: PHYSICIAN ASSISTANT

## 2021-02-24 NOTE — PATIENT INSTRUCTIONS
R elbow contusion  R elbow xray- no fracture/injury noted  Recommend ice and ibuprofen as needed  If no improvement in 1 week f/u with ortho  Follow up with PCP in 3-5 days  Proceed to  ER if symptoms worsen  Contusion in Adults   WHAT YOU NEED TO KNOW:   A contusion is a bruise that appears on your skin after an injury  A bruise happens when small blood vessels tear but skin does not  Blood leaks into nearby tissue, such as soft tissue or muscle  DISCHARGE INSTRUCTIONS:   Return to the emergency department if:   · You have new trouble moving the injured area  · You have tingling or numbness in or near the injured area  · Your hand or foot below the bruise gets cold or turns pale  Call your doctor if:   · You find a new lump in the injured area  · Your symptoms do not improve with treatment after 4 to 5 days  · You have questions or concerns about your condition or care  Medicines: You may need any of the following:  · NSAIDs  help decrease swelling and pain or fever  This medicine is available with or without a doctor's order  NSAIDs can cause stomach bleeding or kidney problems in certain people  If you take blood thinner medicine, always ask your healthcare provider if NSAIDs are safe for you  Always read the medicine label and follow directions  · Prescription pain medicine  may be given  Ask your healthcare provider how to take this medicine safely  Some prescription pain medicines contain acetaminophen  Do not take other medicines that contain acetaminophen without talking to your healthcare provider  Too much acetaminophen may cause liver damage  Prescription pain medicine may cause constipation  Ask your healthcare provider how to prevent or treat constipation  · Take your medicine as directed  Contact your healthcare provider if you think your medicine is not helping or if you have side effects  Tell him of her if you are allergic to any medicine   Keep a list of the medicines, vitamins, and herbs you take  Include the amounts, and when and why you take them  Bring the list or the pill bottles to follow-up visits  Carry your medicine list with you in case of an emergency  Help a contusion heal:   · Rest the injured area  or use it less than usual  If you bruised your leg or foot, you may need crutches or a cane to help you walk  This will help you keep weight off your injured body part  · Apply ice  to decrease swelling and pain  Ice may also help prevent tissue damage  Use an ice pack, or put crushed ice in a plastic bag  Cover it with a towel and place it on your bruise for 15 to 20 minutes every hour or as directed  · Use compression  to support the area and decrease swelling  Wrap an elastic bandage around the area over the bruised muscle  Make sure the bandage is not too tight  You should be able to fit 1 finger between the bandage and your skin  · Elevate (raise) your injured body part  above the level of your heart to help decrease pain and swelling  Use pillows, blankets, or rolled towels to elevate the area as often as you can  · Do not drink alcohol  as directed  Alcohol may slow healing  · Do not stretch injured muscles  right after your injury  Ask your healthcare provider when and how you may safely stretch after your injury  Gentle stretches can help increase your flexibility  · Do not massage the area or put heating pads  on the bruise right after your injury  Heat and massage may slow healing  Your healthcare provider may tell you to apply heat after several days  At that time, heat will start to help the injury heal     Prevent another contusion:   · Stretch and warm up before you play sports or exercise  · Wear protective gear when you play sports  Examples are shin guards and padding       · If you begin a new physical activity, start slowly to give your body a chance to adjust     Follow up with your doctor as directed:  Write down your questions so you remember to ask them during your visits  © Copyright [address] Information is for End User's use only and may not be sold, redistributed or otherwise used for commercial purposes  All illustrations and images included in CareNotes® are the copyrighted property of A D A M [employer] Inc  or [employer]  The above information is an  only  It is not intended as medical advice for individual conditions or treatments  Talk to your doctor, nurse or pharmacist before following any medical regimen to see if it is safe and effective for you

## 2021-02-24 NOTE — PROGRESS NOTES
[address]    NAME: Vee Keller is a 60 y o  female  : 1955    MRN: [id number]  DATE: 2021  TIME: 1:55 PM    Assessment and Plan   Contusion of right elbow, initial encounter [S50 01XA]  1  Contusion of right elbow, initial encounter     2  Elbow injury, right, initial encounter  XR elbow 3+ vw right     Patient Instructions   R elbow contusion  R elbow xray- no fracture/injury noted  Recommend ice and ibuprofen as needed  If no improvement in 1 week f/u with ortho  Follow up with PCP in 3-5 days  Proceed to  ER if symptoms worsen  Chief Complaint     Chief Complaint   Patient presents with    Fall     Pt reports of right arm and elbow pain from a fall occurred approx 2 days ago  Pt also reports of head injury w/o any LOC  History of Present Illness       Delores Vallecillo  Is a 43-year-old female who presents to clinic complaining of right elbow pain x3 days  She states that she tripped in the garage and fell on her right side  Her right arm and elbow hit the   She did to sustained a wound to the right forearm however that is healing normally no complaints  She denies any pain at rest of the elbow but notes increased pain with flexion and extension  She denies any numbness or tingling down the arm or hand  She denies any prior injury to the right elbow  She denies any swelling or bruising of the right elbow  Review of Systems   Review of Systems   Constitutional: Negative  Musculoskeletal: Positive for arthralgias  Negative for joint swelling  Skin: Positive for wound  Negative for color change       Current Medications       Current Outpatient Medications:     albuterol (PROVENTIL HFA,VENTOLIN HFA) 90 mcg/act inhaler, Inhale 2 puffs every 4 (four) hours as needed for wheezing, Disp: 1 Inhaler, Rfl: 0    atorvastatin (LIPITOR) 20 mg tablet, Take 20 mg by mouth daily , Disp: , Rfl: 0    baclofen 10 mg tablet, Take 1 tablet (10 mg total) by mouth 3 (three) times a day, Disp: 15 tablet, Rfl: 0    calcium carbonate-vitamin D (OSCAL-D) 500 mg-200 units per tablet, Take 1 tablet by mouth daily with breakfast, Disp: 30 tablet, Rfl: 0    cephalexin (KEFLEX) 500 mg capsule, take 1 capsule by mouth twice a day for 10 days, Disp: , Rfl:     diphenhydrAMINE (BENADRYL) 50 mg capsule, Take 50 mg by mouth every 6 (six) hours as needed for itching, Disp: , Rfl:     docusate sodium (COLACE) 100 mg capsule, Take 1 capsule (100 mg total) by mouth 2 (two) times a day, Disp: 10 capsule, Rfl: 0    DULoxetine (CYMBALTA) 60 mg delayed release capsule, Take 1 capsule (60 mg total) by mouth 2 (two) times a day, Disp: 60 capsule, Rfl: 0    fludrocortisone (FLORINEF) 0 1 mg tablet, Take 1 tablet (0 1 mg total) by mouth daily (Patient taking differently: Take 0 1 mg by mouth daily ), Disp: 30 tablet, Rfl: 1    gabapentin (NEURONTIN) 300 mg capsule, Take 2 capsules (600 mg total) by mouth 2 (two) times a day, Disp: 120 capsule, Rfl: 2    ipratropium-albuterol (DUO-NEB) 0 5-2 5 mg/3 mL nebulizer solution, Take 1 vial (3 mL total) by nebulization 4 (four) times a day, Disp: 100 mL, Rfl: 0    levothyroxine 50 mcg tablet, Take 1 tablet (50 mcg total) by mouth daily, Disp: 90 tablet, Rfl: 0    meclizine (ANTIVERT) 25 mg tablet, take 1 tablet by mouth every 6 hours if needed  IF DIZZINESS, Disp: , Rfl: 0    midodrine (PROAMATINE) 10 MG tablet, Take 1 tablet (10 mg total) by mouth 3 (three) times a day, Disp: 90 tablet, Rfl: 1    naltrexone (REVIA) 50 mg tablet, Take 1 tablet (50 mg total) by mouth daily, Disp: 30 tablet, Rfl: 2    pantoprazole (PROTONIX) 40 mg tablet, Take 1 tablet (40 mg total) by mouth daily, Disp: 60 tablet, Rfl: 2    polyethylene glycol (MIRALAX) 17 g packet, Take 17 g by mouth 2 (two) times a day, Disp:  , Rfl: 0    polymyxin b-trimethoprim (POLYTRIM) ophthalmic solution, instill 1 drop INTO AFFECTED EYE(S) four times a day as directed BEGIN DROPS MORNING AFTER SURGERY, Disp: , Rfl:     prednisoLONE acetate (PRED FORTE) 1 % ophthalmic suspension, instill 1 drop INTO AFFECTED EYE(S) four times a day as directed BEGIN DROPS MORNING AFTER SURGERY, Disp: , Rfl:     QUEtiapine (SEROquel) 100 mg tablet, Take 1 tablet (100 mg total) by mouth daily at bedtime Take in addition to 25 mg tablet for a total dose of 125 mg at bedtime, Disp: 45 tablet, Rfl: 0    sucralfate (CARAFATE) 1 g tablet, Take 1 tablet (1 g total) by mouth 4 (four) times a day (before meals and at bedtime), Disp: 120 tablet, Rfl: 0    traZODone (DESYREL) 100 mg tablet, Take 1 tablet (100 mg total) by mouth daily at bedtime, Disp: 30 tablet, Rfl: 2    Current Allergies     Allergies as of 02/24/2021 - Reviewed 02/13/2021   Allergen Reaction Noted    Demerol [meperidine] Lightheadedness 01/11/2006    Sulfa antibiotics Hives 01/11/2006            The following portions of the patient's history were reviewed and updated as appropriate: allergies, current medications, past family history, past medical history, past social history, past surgical history and problem list      Past Medical History:   Diagnosis Date    Abdominal adhesions     Anesthesia complication     difficult to wake up    Anxiety     Depression     Depression     Disease of thyroid gland     Fibromyalgia     GERD (gastroesophageal reflux disease)     History of cholecystectomy 9/7/2019    Lazy eye     resolved: 3/27/17    Melanoma ([facility] [address] ) 2010    Osteopenia     with joint pain-elevated DEV    Psychiatric disorder     Tinnitus     Wears glasses     for reading       Past Surgical History:   Procedure Laterality Date    ABDOMINAL SURGERY      lysis of adhesions x 2    APPENDECTOMY      CERVICAL FUSION      CHOLECYSTECTOMY      open    DILATION AND CURETTAGE OF UTERUS      NEUROMA EXCISION Right 5/26/2017    Procedure: EXCISION MASS / FIBROMA FOOT;  Surgeon: Valente Goncalves DPM;  Location: [facility];  Service:    [facility] VITRECTOMY W/ REPAIR OF MACULAR HOLE  12/23/2020    RIGHT OOPHORECTOMY      WISDOM TOOTH EXTRACTION      x4       Family History   Problem Relation Age of Onset    Heart disease Mother     Stroke Mother 58    COPD Mother     Arthritis Mother     Hypertension Father     Kidney disease Brother         kidney transplant    No Known Problems Sister     No Known Problems Maternal Aunt     No Known Problems Maternal Uncle     No Known Problems Paternal Aunt     No Known Problems Paternal Uncle     No Known Problems Maternal Grandmother     No Known Problems Maternal Grandfather     No Known Problems Paternal Grandmother     No Known Problems Paternal Grandfather     ADD / ADHD Neg Hx     Anesthesia problems Neg Hx     Cancer Neg Hx     Clotting disorder Neg Hx     Collagen disease Neg Hx     Diabetes Neg Hx     Dislocations Neg Hx     Learning disabilities Neg Hx     Neurological problems Neg Hx     Osteoporosis Neg Hx     Rheumatologic disease Neg Hx     Scoliosis Neg Hx     Vascular Disease Neg Hx          Medications have been verified  Objective   /81   Pulse 80   Temp 98 5 °F (36 9 °C)   Resp 16   Ht 5' 4" (1 626 m)   Wt 76 7 kg (169 lb)   LMP  (LMP Unknown)   SpO2 99%   BMI 29 01 kg/m²   No LMP recorded (lmp unknown)  Patient is postmenopausal        Physical Exam     Physical Exam  Vitals signs and nursing note reviewed  Constitutional:       General: She is not in acute distress  Appearance: Normal appearance  She is not ill-appearing  Cardiovascular:      Rate and Rhythm: Normal rate and regular rhythm  Heart sounds: Normal heart sounds  Pulmonary:      Effort: Pulmonary effort is normal       Breath sounds: Normal breath sounds  Musculoskeletal:      Right elbow: She exhibits decreased range of motion  She exhibits no swelling, no deformity and no laceration  Tenderness found  Medial epicondyle tenderness noted     Neurological:      Mental Status: She is alert and oriented to person, place, and time     Psychiatric:         Mood and Affect: Mood normal          Behavior: Behavior normal

## 2021-02-25 ENCOUNTER — OFFICE VISIT (OUTPATIENT)
Dept: FAMILY MEDICINE CLINIC | Facility: CLINIC | Age: 66
End: 2021-02-25
Payer: COMMERCIAL

## 2021-02-25 VITALS
SYSTOLIC BLOOD PRESSURE: 134 MMHG | WEIGHT: 173.8 LBS | TEMPERATURE: 97.8 F | HEIGHT: 64 IN | RESPIRATION RATE: 18 BRPM | HEART RATE: 85 BPM | OXYGEN SATURATION: 97 % | BODY MASS INDEX: 29.67 KG/M2 | DIASTOLIC BLOOD PRESSURE: 80 MMHG

## 2021-02-25 DIAGNOSIS — G95.9 CERVICAL MYELOPATHY WITH CERVICAL RADICULOPATHY (HCC): ICD-10-CM

## 2021-02-25 DIAGNOSIS — M54.12 CERVICAL MYELOPATHY WITH CERVICAL RADICULOPATHY (HCC): ICD-10-CM

## 2021-02-25 DIAGNOSIS — M62.838 MUSCLE SPASM: Primary | ICD-10-CM

## 2021-02-25 DIAGNOSIS — R29.898 LEG WEAKNESS, BILATERAL: ICD-10-CM

## 2021-02-25 PROCEDURE — 99213 OFFICE O/P EST LOW 20 MIN: CPT | Performed by: INTERNAL MEDICINE

## 2021-02-25 RX ORDER — BACLOFEN 10 MG/1
10 TABLET ORAL 3 TIMES DAILY
Qty: 30 TABLET | Refills: 0 | Status: SHIPPED | OUTPATIENT
Start: 2021-02-25 | End: 2021-03-24

## 2021-02-26 NOTE — PROGRESS NOTES
Assessment/Plan:    1  Muscle spasm  -     baclofen 10 mg tablet; Take 1 tablet (10 mg total) by mouth 3 (three) times a day    2  Cervical myelopathy with cervical radiculopathy    3  Leg weakness, bilateral    Patient has appropriate follow up and studies pending, rheum eval on Monday and MRI cspine and brain scheduled for 3/8 with neurology followup  We discussed that she really needs to wait for her studies to be done  Will renew baclofen to see if this helps with her pain  She is aware I would not be able to order any other type of pain medication  Advised ER for any worsening symptoms          There are no Patient Instructions on file for this visit  Return if symptoms worsen or fail to improve  Subjective:      Patient ID: Vee Keller is a 60 y o  female  Chief Complaint   Patient presents with    Leg Pain     jmcma    Neck Pain     base of skull behing ears on both sides   Hand Pain     right side shooting pain with weakness    Fatigue       Has been having a lot of pain issues  Started with sharp pain inner right arm to wrist   Went to [facility] and was given baclofen, which helped a bit  Since then all muscles in her legs, hips to thighs to inner thighs have been hurting  She can't sleep with this  She almost went to the ER  Has pain in the base of her skull  L hand feels like she lost  strength  The neurosurgeon has ordered CT c spine and head  These are scheduled for March 8  Has also called Dr Malik Penn  Taking tylenol arthritis with minimal relief  Sees Dr Elliott Pinto on Monday  Baclofen did help her but she was only given 10 days worth  There is no fever or rash  Has some intermittent chest pains but is having a hard time distinguishing this from her anxiety  Leg Pain   The incident occurred 12 to 24 hours ago  Associated symptoms include numbness  Neck Pain   Associated symptoms include leg pain, numbness and weakness     Hand Pain   Associated symptoms include numbness  Fatigue  Associated symptoms include arthralgias, fatigue, neck pain, numbness and weakness  The following portions of the patient's history were reviewed and updated as appropriate: allergies, current medications, past family history, past medical history, past social history, past surgical history and problem list     Review of Systems   Constitutional: Positive for fatigue  Musculoskeletal: Positive for arthralgias and neck pain  Neurological: Positive for weakness and numbness           Current Outpatient Medications   Medication Sig Dispense Refill    albuterol (PROVENTIL HFA,VENTOLIN HFA) 90 mcg/act inhaler Inhale 2 puffs every 4 (four) hours as needed for wheezing 1 Inhaler 0    atorvastatin (LIPITOR) 20 mg tablet Take 20 mg by mouth daily   0    calcium carbonate-vitamin D (OSCAL-D) 500 mg-200 units per tablet Take 1 tablet by mouth daily with breakfast 30 tablet 0    diphenhydrAMINE (BENADRYL) 50 mg capsule Take 50 mg by mouth every 6 (six) hours as needed for itching      docusate sodium (COLACE) 100 mg capsule Take 1 capsule (100 mg total) by mouth 2 (two) times a day (Patient taking differently: Take 100 mg by mouth daily ) 10 capsule 0    DULoxetine (CYMBALTA) 60 mg delayed release capsule Take 1 capsule (60 mg total) by mouth 2 (two) times a day 60 capsule 0    ipratropium-albuterol (DUO-NEB) 0 5-2 5 mg/3 mL nebulizer solution Take 1 vial (3 mL total) by nebulization 4 (four) times a day 100 mL 0    levothyroxine 50 mcg tablet Take 1 tablet (50 mcg total) by mouth daily 90 tablet 0    meclizine (ANTIVERT) 25 mg tablet take 1 tablet by mouth every 6 hours if needed  IF DIZZINESS  0    midodrine (PROAMATINE) 10 MG tablet Take 1 tablet (10 mg total) by mouth 3 (three) times a day 90 tablet 1    naltrexone (REVIA) 50 mg tablet Take 1 tablet (50 mg total) by mouth daily 30 tablet 2    pantoprazole (PROTONIX) 40 mg tablet Take 1 tablet (40 mg total) by mouth daily 60 tablet 2  polyethylene glycol (MIRALAX) 17 g packet Take 17 g by mouth 2 (two) times a day  0    QUEtiapine (SEROquel) 100 mg tablet Take 1 tablet (100 mg total) by mouth daily at bedtime Take in addition to 25 mg tablet for a total dose of 125 mg at bedtime (Patient taking differently: Take 100 mg by mouth daily at bedtime ) 45 tablet 0    traZODone (DESYREL) 100 mg tablet Take 1 tablet (100 mg total) by mouth daily at bedtime 30 tablet 2    baclofen 10 mg tablet Take 1 tablet (10 mg total) by mouth 3 (three) times a day 30 tablet 0    cephalexin (KEFLEX) 500 mg capsule take 1 capsule by mouth twice a day for 10 days      gabapentin (NEURONTIN) 300 mg capsule Take 2 capsules (600 mg total) by mouth 2 (two) times a day 120 capsule 2    prednisoLONE acetate (PRED FORTE) 1 % ophthalmic suspension instill 1 drop INTO AFFECTED EYE(S) four times a day as directed BEGIN DROPS MORNING AFTER SURGERY       No current facility-administered medications for this visit  Objective:    /80   Pulse 85   Temp 97 8 °F (36 6 °C)   Resp 18   Ht 5' 4" (1 626 m)   Wt 78 8 kg (173 lb 12 8 oz)   LMP  (LMP Unknown)   SpO2 97%   BMI 29 83 kg/m²        Physical Exam  Constitutional:       Appearance: Normal appearance  She is well-developed  Eyes:      Conjunctiva/sclera: Conjunctivae normal    Neck:      Musculoskeletal: Neck supple  Thyroid: No thyromegaly  Vascular: No JVD  Cardiovascular:      Rate and Rhythm: Normal rate and regular rhythm  Heart sounds: Normal heart sounds  No murmur  No friction rub  No gallop  Pulmonary:      Effort: Pulmonary effort is normal       Breath sounds: Normal breath sounds  No wheezing or rales  Abdominal:      General: Bowel sounds are normal  There is no distension  Palpations: Abdomen is soft  Tenderness: There is no abdominal tenderness  Musculoskeletal:      Comments: L  strength poor but patient uncooperative    LE hip flexors weak bilaterally   Neurological:      Mental Status: She is alert  Deep Tendon Reflexes:      Reflex Scores:       Bicep reflexes are 2+ on the right side and 2+ on the left side  Patellar reflexes are 3+ on the right side and 3+ on the left side                 Theodora Jane MD

## 2021-03-06 ENCOUNTER — HOSPITAL ENCOUNTER (EMERGENCY)
Facility: HOSPITAL | Age: 66
Discharge: HOME | End: 2021-03-06
Attending: EMERGENCY MEDICINE
Payer: COMMERCIAL

## 2021-03-06 VITALS
HEART RATE: 72 BPM | OXYGEN SATURATION: 98 % | SYSTOLIC BLOOD PRESSURE: 142 MMHG | TEMPERATURE: 98.4 F | DIASTOLIC BLOOD PRESSURE: 86 MMHG | RESPIRATION RATE: 18 BRPM

## 2021-03-06 DIAGNOSIS — M79.10 MYALGIA: Primary | ICD-10-CM

## 2021-03-06 LAB
ALBUMIN SERPL BCP-MCNC: 3.6 G/DL (ref 3.5–5)
ALP SERPL-CCNC: 83 U/L (ref 46–116)
ALT SERPL W P-5'-P-CCNC: 33 U/L (ref 12–78)
ANION GAP SERPL CALCULATED.3IONS-SCNC: 6 MMOL/L (ref 4–13)
AST SERPL W P-5'-P-CCNC: 23 U/L (ref 5–45)
BASOPHILS # BLD AUTO: 0.03 THOUSANDS/ΜL (ref 0–0.1)
BASOPHILS NFR BLD AUTO: 1 % (ref 0–1)
BILIRUB SERPL-MCNC: 0.4 MG/DL (ref 0.2–1)
BUN SERPL-MCNC: 16 MG/DL (ref 5–25)
CALCIUM SERPL-MCNC: 9.3 MG/DL (ref 8.3–10.1)
CHLORIDE SERPL-SCNC: 107 MMOL/L (ref 100–108)
CK SERPL-CCNC: 100 U/L (ref 26–192)
CO2 SERPL-SCNC: 30 MMOL/L (ref 21–32)
CREAT SERPL-MCNC: 1.01 MG/DL (ref 0.6–1.3)
EOSINOPHIL # BLD AUTO: 0.11 THOUSAND/ΜL (ref 0–0.61)
EOSINOPHIL NFR BLD AUTO: 2 % (ref 0–6)
ERYTHROCYTE [DISTWIDTH] IN BLOOD BY AUTOMATED COUNT: 13.5 % (ref 11.6–15.1)
GFR SERPL CREATININE-BSD FRML MDRD: 59 ML/MIN/1.73SQ M
GLUCOSE SERPL-MCNC: 106 MG/DL (ref 65–140)
HCT VFR BLD AUTO: 39.7 % (ref 34.8–46.1)
HGB BLD-MCNC: 12.2 G/DL (ref 11.5–15.4)
IMM GRANULOCYTES # BLD AUTO: 0 THOUSAND/UL (ref 0–0.2)
IMM GRANULOCYTES NFR BLD AUTO: 0 % (ref 0–2)
LYMPHOCYTES # BLD AUTO: 1.15 THOUSANDS/ΜL (ref 0.6–4.47)
LYMPHOCYTES NFR BLD AUTO: 25 % (ref 14–44)
MCH RBC QN AUTO: 27.1 PG (ref 26.8–34.3)
MCHC RBC AUTO-ENTMCNC: 30.7 G/DL (ref 31.4–37.4)
MCV RBC AUTO: 88 FL (ref 82–98)
MONOCYTES # BLD AUTO: 0.45 THOUSAND/ΜL (ref 0.17–1.22)
MONOCYTES NFR BLD AUTO: 10 % (ref 4–12)
NEUTROPHILS # BLD AUTO: 2.95 THOUSANDS/ΜL (ref 1.85–7.62)
NEUTS SEG NFR BLD AUTO: 62 % (ref 43–75)
NRBC BLD AUTO-RTO: 0 /100 WBCS
PLATELET # BLD AUTO: 188 THOUSANDS/UL (ref 149–390)
PMV BLD AUTO: 11.1 FL (ref 8.9–12.7)
POTASSIUM SERPL-SCNC: 4.4 MMOL/L (ref 3.5–5.3)
PROT SERPL-MCNC: 7 G/DL (ref 6.4–8.2)
RBC # BLD AUTO: 4.51 MILLION/UL (ref 3.81–5.12)
SODIUM SERPL-SCNC: 143 MMOL/L (ref 136–145)
WBC # BLD AUTO: 4.69 THOUSAND/UL (ref 4.31–10.16)

## 2021-03-06 PROCEDURE — 96372 THER/PROPH/DIAG INJ SC/IM: CPT

## 2021-03-06 PROCEDURE — 36415 COLL VENOUS BLD VENIPUNCTURE: CPT | Performed by: EMERGENCY MEDICINE

## 2021-03-06 PROCEDURE — 85025 COMPLETE CBC W/AUTO DIFF WBC: CPT | Performed by: EMERGENCY MEDICINE

## 2021-03-06 PROCEDURE — 82550 ASSAY OF CK (CPK): CPT | Performed by: EMERGENCY MEDICINE

## 2021-03-06 PROCEDURE — 99284 EMERGENCY DEPT VISIT MOD MDM: CPT | Performed by: EMERGENCY MEDICINE

## 2021-03-06 PROCEDURE — 99283 EMERGENCY DEPT VISIT LOW MDM: CPT

## 2021-03-06 PROCEDURE — 80053 COMPREHEN METABOLIC PANEL: CPT | Performed by: EMERGENCY MEDICINE

## 2021-03-06 RX ORDER — KETOROLAC TROMETHAMINE 30 MG/ML
30 INJECTION, SOLUTION INTRAMUSCULAR; INTRAVENOUS ONCE
Status: COMPLETED | OUTPATIENT
Start: 2021-03-06 | End: 2021-03-06

## 2021-03-06 RX ORDER — OXYCODONE HYDROCHLORIDE AND ACETAMINOPHEN 5; 325 MG/1; MG/1
1 TABLET ORAL EVERY 4 HOURS PRN
Qty: 12 TABLET | Refills: 0 | Status: SHIPPED | OUTPATIENT
Start: 2021-03-06 | End: 2021-03-16

## 2021-03-06 RX ORDER — OXYCODONE HYDROCHLORIDE AND ACETAMINOPHEN 5; 325 MG/1; MG/1
1 TABLET ORAL ONCE
Status: COMPLETED | OUTPATIENT
Start: 2021-03-06 | End: 2021-03-06

## 2021-03-06 RX ORDER — HYDROMORPHONE HCL/PF 1 MG/ML
1 SYRINGE (ML) INJECTION ONCE
Status: COMPLETED | OUTPATIENT
Start: 2021-03-06 | End: 2021-03-06

## 2021-03-06 RX ADMIN — OXYCODONE HYDROCHLORIDE AND ACETAMINOPHEN 1 TABLET: 5; 325 TABLET ORAL at 18:22

## 2021-03-06 RX ADMIN — KETOROLAC TROMETHAMINE 30 MG: 30 INJECTION, SOLUTION INTRAMUSCULAR at 19:45

## 2021-03-06 RX ADMIN — HYDROMORPHONE HYDROCHLORIDE 1 MG: 1 INJECTION, SOLUTION INTRAMUSCULAR; INTRAVENOUS; SUBCUTANEOUS at 20:28

## 2021-03-06 NOTE — ED NOTES
Pt states that she has been sleeping about 12 hours a day recently  "I wake up and then take a nap in the afternoon"  Pt states she has pain all over       Ludwin Thomas, JIE  03/06/21 [id number]

## 2021-03-06 NOTE — Clinical Note
Karin Hoffman was seen and treated in our emergency department on 3/6/2021  Diagnosis:     Park Bellamy  may return to work on return date  She may return on this date: 03/09/2021         If you have any questions or concerns, please don't hesitate to call        Geeta Orozco DO    ______________________________           _______________          _______________  Hospital Representative                              Date                                Time

## 2021-03-07 NOTE — ED PROVIDER NOTES
History  Chief Complaint   Patient presents with    Muscle Pain     states dx with hyperreflexia  by dr Jodi Espinoza and rheumatologist  has two mri's scheduled in two days  one of cervical and one of lumbar spine  cant stand pain any more    Cough     intermittent cough for a couple of days     61-year-old female presents with complaints of muscle pain hyperreflexia over whole body  Specially the arms in the the legs  Patient states that she had cervical surgery in the past and is waiting for MRI of her cervical spine from her neurosurgeon to see if this is causing any the pain  Patient is awake alert no bowel bladder dysfunction  No focal deficits associated with stroke  Denies any fevers or chills  History provided by:  Patient   used: No        Prior to Admission Medications   Prescriptions Last Dose Informant Patient Reported? Taking?    DULoxetine (CYMBALTA) 60 mg delayed release capsule   No No   Sig: Take 1 capsule (60 mg total) by mouth 2 (two) times a day   QUEtiapine (SEROquel) 100 mg tablet   No No   Sig: Take 1 tablet (100 mg total) by mouth daily at bedtime Take in addition to 25 mg tablet for a total dose of 125 mg at bedtime   Patient taking differently: Take 100 mg by mouth daily at bedtime    albuterol (PROVENTIL HFA,VENTOLIN HFA) 90 mcg/act inhaler   No No   Sig: Inhale 2 puffs every 4 (four) hours as needed for wheezing   atorvastatin (LIPITOR) 20 mg tablet   Yes No   Sig: Take 20 mg by mouth daily    baclofen 10 mg tablet   No No   Sig: Take 1 tablet (10 mg total) by mouth 3 (three) times a day   calcium carbonate-vitamin D (OSCAL-D) 500 mg-200 units per tablet   No No   Sig: Take 1 tablet by mouth daily with breakfast   cephalexin (KEFLEX) 500 mg capsule   Yes No   Sig: take 1 capsule by mouth twice a day for 10 days   diphenhydrAMINE (BENADRYL) 50 mg capsule   Yes No   Sig: Take 50 mg by mouth every 6 (six) hours as needed for itching   docusate sodium (COLACE) 100 mg capsule   No No   Sig: Take 1 capsule (100 mg total) by mouth 2 (two) times a day   Patient taking differently: Take 100 mg by mouth daily    gabapentin (NEURONTIN) 300 mg capsule   No No   Sig: Take 2 capsules (600 mg total) by mouth 2 (two) times a day   ipratropium-albuterol (DUO-NEB) 0 5-2 5 mg/3 mL nebulizer solution   No No   Sig: Take 1 vial (3 mL total) by nebulization 4 (four) times a day   levothyroxine 50 mcg tablet   No No   Sig: Take 1 tablet (50 mcg total) by mouth daily   meclizine (ANTIVERT) 25 mg tablet   Yes No   Sig: take 1 tablet by mouth every 6 hours if needed  IF DIZZINESS   midodrine (PROAMATINE) 10 MG tablet   No No   Sig: Take 1 tablet (10 mg total) by mouth 3 (three) times a day   naltrexone (REVIA) 50 mg tablet   No No   Sig: Take 1 tablet (50 mg total) by mouth daily   pantoprazole (PROTONIX) 40 mg tablet   No No   Sig: Take 1 tablet (40 mg total) by mouth daily   polyethylene glycol (MIRALAX) 17 g packet   No No   Sig: Take 17 g by mouth 2 (two) times a day   prednisoLONE acetate (PRED FORTE) 1 % ophthalmic suspension   Yes No   Sig: instill 1 drop INTO AFFECTED EYE(S) four times a day as directed BEGIN DROPS MORNING AFTER SURGERY   traZODone (DESYREL) 100 mg tablet   No No   Sig: Take 1 tablet (100 mg total) by mouth daily at bedtime      Facility-Administered Medications: None       Past Medical History:   Diagnosis Date    Abdominal adhesions     Anesthesia complication     difficult to wake up    Anxiety     Depression     Depression     Disease of thyroid gland     Fibromyalgia     GERD (gastroesophageal reflux disease)     History of cholecystectomy 9/7/2019    Lazy eye     resolved: 3/27/17    Melanoma ([facility] [address] ) 2010    Osteopenia     with joint pain-elevated DEV    Psychiatric disorder     Tinnitus     Wears glasses     for reading       Past Surgical History:   Procedure Laterality Date    ABDOMINAL SURGERY      lysis of adhesions x 2    APPENDECTOMY      CERVICAL FUSION      CHOLECYSTECTOMY      open    DILATION AND CURETTAGE OF UTERUS      NEUROMA EXCISION Right 5/26/2017    Procedure: EXCISION MASS / FIBROMA FOOT;  Surgeon: Kimberlee Dang DPM;  Location: [address] [place] [address];  Service:     PARS PLANA VITRECTOMY W/ REPAIR OF MACULAR HOLE  12/23/2020    RIGHT OOPHORECTOMY      WISDOM TOOTH EXTRACTION      x4       Family History   Problem Relation Age of Onset    Heart disease Mother     Stroke Mother 58    COPD Mother     Arthritis Mother     Hypertension Father     Kidney disease Brother         kidney transplant    No Known Problems Sister     No Known Problems Maternal Aunt     No Known Problems Maternal Uncle     No Known Problems Paternal Aunt     No Known Problems Paternal Uncle     No Known Problems Maternal Grandmother     No Known Problems Maternal Grandfather     No Known Problems Paternal Grandmother     No Known Problems Paternal Grandfather     ADD / ADHD Neg Hx     Anesthesia problems Neg Hx     Cancer Neg Hx     Clotting disorder Neg Hx     Collagen disease Neg Hx     Diabetes Neg Hx     Dislocations Neg Hx     Learning disabilities Neg Hx     Neurological problems Neg Hx     Osteoporosis Neg Hx     Rheumatologic disease Neg Hx     Scoliosis Neg Hx     Vascular Disease Neg Hx      I have reviewed and agree with the history as documented  E-Cigarette/Vaping    E-Cigarette Use Never User      E-Cigarette/Vaping Substances    Nicotine No     THC No     CBD No     Flavoring No     Other No     Unknown No      Social History     Tobacco Use    Smoking status: Never Smoker    Smokeless tobacco: Never Used   Substance Use Topics    Alcohol use: Not Currently     Frequency: Never     Binge frequency: Never    Drug use: Never       Review of Systems   Constitutional: Negative for activity change, chills, diaphoresis and fever     HENT: Negative for congestion, ear pain, nosebleeds, sore throat, trouble swallowing and voice change  Eyes: Negative for pain, discharge and redness  Respiratory: Negative for apnea, cough, choking, shortness of breath, wheezing and stridor  Cardiovascular: Negative for chest pain and palpitations  Gastrointestinal: Negative for abdominal distention, abdominal pain, constipation, diarrhea, nausea and vomiting  Endocrine: Negative for polydipsia  Genitourinary: Negative for difficulty urinating, dysuria, flank pain, frequency, hematuria and urgency  Musculoskeletal: Positive for myalgias  Negative for back pain, gait problem, joint swelling, neck pain and neck stiffness  Skin: Negative for pallor and rash  Neurological: Negative for dizziness, tremors, syncope, speech difficulty, weakness, numbness and headaches  Hematological: Negative for adenopathy  Psychiatric/Behavioral: Negative for confusion, hallucinations, self-injury and suicidal ideas  The patient is not nervous/anxious  Physical Exam  Physical Exam  Vitals signs and nursing note reviewed  Constitutional:       General: She is not in acute distress  Appearance: She is well-developed  She is not diaphoretic  HENT:      Head: Normocephalic and atraumatic  Right Ear: External ear normal       Left Ear: External ear normal       Nose: Nose normal    Eyes:      Conjunctiva/sclera: Conjunctivae normal       Pupils: Pupils are equal, round, and reactive to light  Neck:      Musculoskeletal: Normal range of motion and neck supple  Cardiovascular:      Rate and Rhythm: Normal rate and regular rhythm  Heart sounds: Normal heart sounds  Pulmonary:      Effort: Pulmonary effort is normal       Breath sounds: Normal breath sounds  Abdominal:      General: Bowel sounds are normal       Palpations: Abdomen is soft  Musculoskeletal: Normal range of motion  General: Tenderness present  Comments: Diffuse tenderness throughout her arms and legs  Skin:     General: Skin is warm and dry  Neurological:      Mental Status: She is alert and oriented to person, place, and time  Deep Tendon Reflexes: Reflexes are normal and symmetric  Psychiatric:         Behavior: Behavior is cooperative           Vital Signs  ED Triage Vitals [03/06/21 1736]   Temperature Pulse Respirations Blood Pressure SpO2   98 4 °F (36 9 °C) 98 20 141/81 96 %      Temp Source Heart Rate Source Patient Position - Orthostatic VS BP Location FiO2 (%)   Tympanic Monitor Sitting Right arm --      Pain Score       Worst Possible Pain           Vitals:    03/06/21 1736   BP: 141/81   Pulse: 98   Patient Position - Orthostatic VS: Sitting         Visual Acuity      ED Medications  Medications   ketorolac (TORADOL) injection 30 mg (has no administration in time range)   oxyCODONE-acetaminophen (PERCOCET) 5-325 mg per tablet 1 tablet (1 tablet Oral Given 3/6/21 1822)       Diagnostic Studies  Results Reviewed     Procedure Component Value Units Date/Time    Comprehensive metabolic panel [[id number]] Collected: 03/06/21 1829    Lab Status: Final result Specimen: Blood from Arm, Right Updated: 03/06/21 1855     Sodium 143 mmol/L      Potassium 4 4 mmol/L      Chloride 107 mmol/L      CO2 30 mmol/L      ANION GAP 6 mmol/L      BUN 16 mg/dL      Creatinine 1 01 mg/dL      Glucose 106 mg/dL      Calcium 9 3 mg/dL      AST 23 U/L      ALT 33 U/L      Alkaline Phosphatase 83 U/L      Total Protein 7 0 g/dL      Albumin 3 6 g/dL      Total Bilirubin 0 40 mg/dL      eGFR 59 ml/min/1 73sq m     Narrative:      [employer] guidelines for Chronic Kidney Disease (CKD):     Stage 1 with normal or high GFR (GFR > 90 mL/min/1 73 square meters)    Stage 2 Mild CKD (GFR = 60-89 mL/min/1 73 square meters)    Stage 3A Moderate CKD (GFR = 45-59 mL/min/1 73 square meters)    Stage 3B Moderate CKD (GFR = 30-44 mL/min/1 73 square meters)    Stage 4 Severe CKD (GFR = 15-29 mL/min/1 73 square meters)    Stage 5 End Stage CKD (GFR <15 mL/min/1 73 square meters)  Note: GFR calculation is accurate only with a steady state creatinine    CK (with reflex to MB) [[id number]]  (Normal) Collected: 03/06/21 1829    Lab Status: Final result Specimen: Blood from Arm, Right Updated: 03/06/21 1855     Total  U/L     CBC and differential [[id number]]  (Abnormal) Collected: 03/06/21 1829    Lab Status: Final result Specimen: Blood from Arm, Right Updated: 03/06/21 1837     WBC 4 69 Thousand/uL      RBC 4 51 Million/uL      Hemoglobin 12 2 g/dL      Hematocrit 39 7 %      MCV 88 fL      MCH 27 1 pg      MCHC 30 7 g/dL      RDW 13 5 %      MPV 11 1 fL      Platelets 824 Thousands/uL      nRBC 0 /100 WBCs      Neutrophils Relative 62 %      Immat GRANS % 0 %      Lymphocytes Relative 25 %      Monocytes Relative 10 %      Eosinophils Relative 2 %      Basophils Relative 1 %      Neutrophils Absolute 2 95 Thousands/µL      Immature Grans Absolute 0 00 Thousand/uL      Lymphocytes Absolute 1 15 Thousands/µL      Monocytes Absolute 0 45 Thousand/µL      Eosinophils Absolute 0 11 Thousand/µL      Basophils Absolute 0 03 Thousands/µL                  No orders to display              Procedures  Procedures         ED Course                                           MDM    Disposition  Final diagnoses:   Myalgia     Time reflects when diagnosis was documented in both MDM as applicable and the Disposition within this note     Time User Action Codes Description Comment    3/6/2021  7:21 PM Nilesh Fernandez Add [W60 04] Myalgia       ED Disposition     ED Disposition Condition Date/Time Comment    Discharge Stable Sat Mar 6, 2021  7:21 PM Angel Dang discharge to home/self care  Follow-up Information     Follow up With Specialties Details Why Contact Info    Marleny Will MD Internal Medicine, Family Medicine Schedule an appointment as soon as possible for a visit  As needed [address]    [address] [address]  [phone]            Patient's Medications   Discharge Prescriptions    OXYCODONE-ACETAMINOPHEN (PERCOCET) 5-325 MG PER TABLET    Take 1 tablet by mouth every 4 (four) hours as needed for severe pain for up to 10 daysMax Daily Amount: 6 tablets       Start Date: 3/6/2021  End Date: 3/16/2021       Order Dose: 1 tablet       Quantity: 12 tablet    Refills: 0     No discharge procedures on file      PDMP Review       Value Time User    PDMP Reviewed  Yes 1/25/2021  7:36 PM YURI Banks          ED Provider  Electronically Signed by           Thompson Hooks DO  03/06/21 0485

## 2021-03-07 NOTE — ED NOTES
Pt states that she is still uncomfortable  Pt states pain all over, no change from baseline       Altagracia Busch RN  03/06/21 2006

## 2021-03-08 ENCOUNTER — HOSPITAL ENCOUNTER (OUTPATIENT)
Dept: RADIOLOGY | Facility: HOSPITAL | Age: 66
Discharge: HOME | End: 2021-03-08
Payer: COMMERCIAL

## 2021-03-08 DIAGNOSIS — M54.16 LUMBAR RADICULOPATHY: ICD-10-CM

## 2021-03-08 DIAGNOSIS — M47.812 CERVICAL SPONDYLOSIS WITHOUT MYELOPATHY: ICD-10-CM

## 2021-03-08 PROCEDURE — 72148 MRI LUMBAR SPINE W/O DYE: CPT

## 2021-03-08 PROCEDURE — G1004 CDSM NDSC: HCPCS

## 2021-03-08 PROCEDURE — 72141 MRI NECK SPINE W/O DYE: CPT

## 2021-03-10 ENCOUNTER — OFFICE VISIT (OUTPATIENT)
Dept: NEUROLOGY | Facility: CLINIC | Age: 66
End: 2021-03-10
Payer: COMMERCIAL

## 2021-03-10 VITALS
SYSTOLIC BLOOD PRESSURE: 114 MMHG | DIASTOLIC BLOOD PRESSURE: 82 MMHG | WEIGHT: 173 LBS | HEIGHT: 64 IN | BODY MASS INDEX: 29.53 KG/M2 | HEART RATE: 95 BPM

## 2021-03-10 DIAGNOSIS — M54.12 CERVICAL MYELOPATHY WITH CERVICAL RADICULOPATHY (HCC): ICD-10-CM

## 2021-03-10 DIAGNOSIS — M54.16 RADICULOPATHY OF LUMBAR REGION: Primary | Chronic | ICD-10-CM

## 2021-03-10 DIAGNOSIS — I95.1 ORTHOSTATIC HYPOTENSION: ICD-10-CM

## 2021-03-10 DIAGNOSIS — M79.7 FIBROMYALGIA: ICD-10-CM

## 2021-03-10 DIAGNOSIS — G95.9 CERVICAL MYELOPATHY WITH CERVICAL RADICULOPATHY (HCC): ICD-10-CM

## 2021-03-10 DIAGNOSIS — G62.9 NEUROPATHY: ICD-10-CM

## 2021-03-10 PROCEDURE — 99215 OFFICE O/P EST HI 40 MIN: CPT | Performed by: NURSE PRACTITIONER

## 2021-03-10 RX ORDER — MIDODRINE HYDROCHLORIDE 10 MG/1
10 TABLET ORAL 3 TIMES DAILY
Qty: 90 TABLET | Refills: 2 | Status: SHIPPED | OUTPATIENT
Start: 2021-03-10 | End: 2021-10-29 | Stop reason: SDUPTHER

## 2021-03-10 RX ORDER — GABAPENTIN 300 MG/1
600 CAPSULE ORAL 3 TIMES DAILY
Qty: 90 CAPSULE | Refills: 3 | Status: SHIPPED | OUTPATIENT
Start: 2021-03-10 | End: 2021-07-01 | Stop reason: SDUPTHER

## 2021-03-10 NOTE — PROGRESS NOTES
Patient ID: Butch Gallagher is a 60 y o  female  Assessment/Plan:         Diagnoses and all orders for this visit:    Radiculopathy of lumbar region  -     EMG 2 limb lower extremity; Future    Fibromyalgia  -     gabapentin (NEURONTIN) 300 mg capsule; Take 2 capsules (600 mg total) by mouth 3 (three) times a day    Orthostatic hypotension  -     midodrine (PROAMATINE) 10 MG tablet; Take 1 tablet (10 mg total) by mouth 3 (three) times a day    Cervical myelopathy with cervical radiculopathy  -     EMG 2 Limb Upper Extremity; Future    Neuropathy  -     EMG 2 Limb Upper Extremity; Future  -     EMG 2 limb lower extremity; Future       Increase Gabapentin to 600mg 3 times a day  Will d/w psychiatry change from Cymbalta to Nortriptyline vs Effexor (venalofaxine)  Will do EMG of arms and legs to rule out or in neuropathies vs radicular symptoms  Follow up with Neurosurgery re: MRI findings  Discuss with Therapist adding or increasing therapies to consist of Cognitive Behavioral Therapies  Continue with Midodrine 10mg three times a day  Follow up with Neurology office in May with Dr Dante Knight or after EMGs with provider of your choice    Subjective/HPI:  Butch Gallagher Is a  64-year-old female who follows with outpatient neurology for medical management of headaches, dizziness and neuralgia  Patient last seen January of 2021 by Dr Dante Knight, had reports of dizziness weakness and migraines  She also reported new complaints feeling sleepy and lightheaded when she stands  Patient had been on midodrine 10 mg b i d  for a year and half for orthostatic hypotension  She also has been on new medication by her psych provider naltrexone for her pathologic gambling  Since this appointment  Patient had multiple visits to the ER and to her PCP with ongoing complaints of generalized staying electrical pains throughout her body  Patient is on gabapentin 600 mg twice a day as well as Cymbalta 60 mg per her psych provider  MRI of the cervical spine completed March 8th, patient finding reads stable anterior fusion at C3-C4 and C4-C5  Notes persistent left C4-C5 and C5-C6 foraminal stenosis correlating with left C5 and left C6 radiculitis  MRI of the lumbar spine completed on the same date with essentially stable findings  Asymmetric right L4-5 foraminal stenosis noted however patient has complaints consisted of left radicular symptoms  Patient had been referred to Rheumatology for further evaluation of possible fibromyalgia given her persistent complaints of  fatigue, pain, weakness and multiple other complaints  No appointment has been located in EHR at this time however pt reports being seen by Dr Robert Valero  She stated that he does not know what is wrong and referred her back to Neurology  Reviewed patient's ROS, will discuss with patient during this exam     Pt reports that she continues to have muscle pains up and down her arms and legs  She reports that she is having more stabbing pains and noted that she felt that her reflexes are hyper reactive  She noted that she is struggling to function  She noted that she sleeping more a day and noted that she is usually a 6hr per day  Patient very tearful during this appointment, reports that she is frustrated and just wants to know what is wrong with her  We discussed her medications, she noted that she is not taking Baclofen as it was not helping her  She noted that she has not had her Cymbalta for   Over a week due to issues with prescriptions at the pharmacy  She reports that the issues are just progressing and noted that the Cymbalta was not effective anyway for her pain  She is taking Gabapentin and denies that it is helping her at all  Pt reports that she has a therapist and a psych med provider  She is happy with her therapist but is having difficulty with her medication provider    She is on Benadryl as needed for allergies and has not taken this for about 3 months  She noted that she had a staring spell with her mother once that lasted about 30 seconds  This is one isolated event that she is aware of  Question if related to a hypotensive event or other etiology  Advised pt that if she should have a repeat event, will send for EEG to rule out seizure activity  However at this time can not confirm that this was any type of seizure event  Pt denies having any decrease in her stress levels and noted that she is social and is able to be out and about  She reports that she is increasingly anxious and noted some depression  She reports that she has pain all day, is not worse during the day or the night  She reports that if she lays on one side she will get pain and discomfort on that side, if she turns it is the same  If she lays on her back she gets pain to her neck and hears swooshing in her head  Pt is tearful and frustrated with all of her aches and pains  We discussed poss use of Pamelor or Effexor in the absence of Cymbalta, given pt did not feel it was working  Pt did report taking Cymbalta every other day as she was running low on medications  Will need to confer with Psychiatry re: change in medication to cover both anxiety, depression and neuropathy  D/W pt doing EMG to rule out Neuropathies as her etiology for her pain  She stated that Rheumatology is stating questionable for Fibromyalgia given that her pressure point findings are no longer a criteria  Pt would like to rule out neuropathies  We discussed increasing Gabapentin to 600mg tid, pt is agreeable for this  We also had discussion that it could be that her symptoms are more of a manifestation of her traumatic history and psychiatric components that may be causing her more physical manifestations that may need more intensive CBT  Will continue to rule out physical/neurological etiology before considering the poss of Conversion Disorder       Dizziness, pt has had a couple of episodes recently when she gets up and at time will walk side ways  She stated that her  feels like she walks crooked sometimes  She is taking her midodrine and is tolerating this well        The following portions of the patient's history were reviewed and updated as appropriate: allergies, current medications, past family history, past medical history, past social history, past surgical history and problem list         Past Medical History:   Diagnosis Date    Abdominal adhesions     Anesthesia complication     difficult to wake up    Anxiety     Depression     Depression     Disease of thyroid gland     Fibromyalgia     GERD (gastroesophageal reflux disease)     History of cholecystectomy 9/7/2019    Lazy eye     resolved: 3/27/17    Melanoma ([facility] [address] ) 2010    Osteopenia     with joint pain-elevated DEV    Psychiatric disorder     Tinnitus     Wears glasses     for reading       Past Surgical History:   Procedure Laterality Date    ABDOMINAL SURGERY      lysis of adhesions x 2    APPENDECTOMY      CERVICAL FUSION      CHOLECYSTECTOMY      open    DILATION AND CURETTAGE OF UTERUS      NEUROMA EXCISION Right 5/26/2017    Procedure: EXCISION MASS / Akhil Rose;  Surgeon: Antonia Haynes DPM;  Location: [address] [place] [address];  Service:     PARS PLANA VITRECTOMY W/ REPAIR OF MACULAR HOLE  12/23/2020    RIGHT OOPHORECTOMY      WISDOM TOOTH EXTRACTION      x4       Social History     Socioeconomic History    Marital status: /Civil Union     Spouse name: None    Number of children: None    Years of education: None    Highest education level: None   Occupational History    None   Social Needs    Financial resource strain: None    Food insecurity     Worry: None     Inability: None    Transportation needs     Medical: None     Non-medical: None   Tobacco Use    Smoking status: Never Smoker    Smokeless tobacco: Never Used   Substance and Sexual Activity    Alcohol use: Not Currently Frequency: Never     Binge frequency: Never    Drug use: Never    Sexual activity: Not Currently   Lifestyle    Physical activity     Days per week: None     Minutes per session: None    Stress: None   Relationships    Social connections     Talks on phone: None     Gets together: None     Attends Sabianist service: None     Active member of club or organization: None     Attends meetings of clubs or organizations: None     Relationship status: None    Intimate partner violence     Fear of current or ex partner: None     Emotionally abused: None     Physically abused: None     Forced sexual activity: None   Other Topics Concern    None   Social History Narrative    None       Family History   Problem Relation Age of Onset    Heart disease Mother     Stroke Mother 58    COPD Mother     Arthritis Mother     Hypertension Father     Kidney disease Brother         kidney transplant    No Known Problems Sister     No Known Problems Maternal Aunt     No Known Problems Maternal Uncle     No Known Problems Paternal Aunt     No Known Problems Paternal Uncle     No Known Problems Maternal Grandmother     No Known Problems Maternal Grandfather     No Known Problems Paternal Grandmother     No Known Problems Paternal Grandfather     ADD / ADHD Neg Hx     Anesthesia problems Neg Hx     Cancer Neg Hx     Clotting disorder Neg Hx     Collagen disease Neg Hx     Diabetes Neg Hx     Dislocations Neg Hx     Learning disabilities Neg Hx     Neurological problems Neg Hx     Osteoporosis Neg Hx     Rheumatologic disease Neg Hx     Scoliosis Neg Hx     Vascular Disease Neg Hx          Current Outpatient Medications:     albuterol (PROVENTIL HFA,VENTOLIN HFA) 90 mcg/act inhaler, Inhale 2 puffs every 4 (four) hours as needed for wheezing, Disp: 1 Inhaler, Rfl: 0    atorvastatin (LIPITOR) 20 mg tablet, Take 20 mg by mouth daily , Disp: , Rfl: 0    calcium carbonate-vitamin D (OSCAL-D) 500 mg-200 units per tablet, Take 1 tablet by mouth daily with breakfast, Disp: 30 tablet, Rfl: 0    diphenhydrAMINE (BENADRYL) 50 mg capsule, Take 50 mg by mouth every 6 (six) hours as needed for itching, Disp: , Rfl:     docusate sodium (COLACE) 100 mg capsule, Take 1 capsule (100 mg total) by mouth 2 (two) times a day (Patient taking differently: Take 100 mg by mouth daily ), Disp: 10 capsule, Rfl: 0    DULoxetine (CYMBALTA) 60 mg delayed release capsule, Take 1 capsule (60 mg total) by mouth 2 (two) times a day, Disp: 60 capsule, Rfl: 0    gabapentin (NEURONTIN) 300 mg capsule, Take 2 capsules (600 mg total) by mouth 3 (three) times a day, Disp: 90 capsule, Rfl: 3    ipratropium-albuterol (DUO-NEB) 0 5-2 5 mg/3 mL nebulizer solution, Take 1 vial (3 mL total) by nebulization 4 (four) times a day, Disp: 100 mL, Rfl: 0    levothyroxine 50 mcg tablet, Take 1 tablet (50 mcg total) by mouth daily, Disp: 90 tablet, Rfl: 0    meclizine (ANTIVERT) 25 mg tablet, take 1 tablet by mouth every 6 hours if needed  IF DIZZINESS, Disp: , Rfl: 0    midodrine (PROAMATINE) 10 MG tablet, Take 1 tablet (10 mg total) by mouth 3 (three) times a day, Disp: 90 tablet, Rfl: 2    naltrexone (REVIA) 50 mg tablet, Take 1 tablet (50 mg total) by mouth daily, Disp: 30 tablet, Rfl: 2    oxyCODONE-acetaminophen (PERCOCET) 5-325 mg per tablet, Take 1 tablet by mouth every 4 (four) hours as needed for severe pain for up to 10 daysMax Daily Amount: 6 tablets, Disp: 12 tablet, Rfl: 0    pantoprazole (PROTONIX) 40 mg tablet, Take 1 tablet (40 mg total) by mouth daily, Disp: 60 tablet, Rfl: 2    polyethylene glycol (MIRALAX) 17 g packet, Take 17 g by mouth 2 (two) times a day, Disp:  , Rfl: 0    QUEtiapine (SEROquel) 100 mg tablet, Take 1 tablet (100 mg total) by mouth daily at bedtime Take in addition to 25 mg tablet for a total dose of 125 mg at bedtime (Patient taking differently: Take 100 mg by mouth daily at bedtime ), Disp: 45 tablet, Rfl: 0   traZODone (DESYREL) 100 mg tablet, Take 1 tablet (100 mg total) by mouth daily at bedtime, Disp: 30 tablet, Rfl: 2    baclofen 10 mg tablet, Take 1 tablet (10 mg total) by mouth 3 (three) times a day (Patient not taking: Reported on 3/10/2021), Disp: 30 tablet, Rfl: 0    cephalexin (KEFLEX) 500 mg capsule, take 1 capsule by mouth twice a day for 10 days, Disp: , Rfl:     prednisoLONE acetate (PRED FORTE) 1 % ophthalmic suspension, instill 1 drop INTO AFFECTED EYE(S) four times a day as directed BEGIN DROPS MORNING AFTER SURGERY, Disp: , Rfl:     Allergies   Allergen Reactions    Demerol [Meperidine] Lightheadedness     Reaction Date: 11Jan2006;     Sulfa Antibiotics Hives     Reaction Date: 11Jan2006;         Blood pressure 114/82, pulse 95, height 5' 4" (1 626 m), weight 78 5 kg (173 lb)  Objective:    Blood pressure 114/82, pulse 95, height 5' 4" (1 626 m), weight 78 5 kg (173 lb)  Physical Exam  Vitals signs reviewed  Constitutional:       Appearance: She is well-developed  HENT:      Head: Normocephalic  Nose: Nose normal       Mouth/Throat:      Mouth: Mucous membranes are moist    Eyes:      General: Lids are normal       Extraocular Movements: Extraocular movements intact  Pupils: Pupils are equal, round, and reactive to light  Neck:      Musculoskeletal: Normal range of motion  Cardiovascular:      Rate and Rhythm: Normal rate and regular rhythm  Pulmonary:      Effort: Pulmonary effort is normal       Breath sounds: Normal breath sounds  Abdominal:      General: Bowel sounds are normal       Palpations: Abdomen is soft  Skin:     General: Skin is warm and dry  Neurological:      Mental Status: She is alert  Coordination: Romberg sign negative  Deep Tendon Reflexes: Strength normal and reflexes are normal and symmetric  Psychiatric:         Speech: Speech normal          Behavior: Behavior normal          Thought Content:  Thought content normal  Judgment: Judgment normal          Neurological Exam  Mental Status  Alert  Oriented to person, place, time and situation  Recent and remote memory are intact  Speech is normal  Follows three-step commands  Attention and concentration are normal  Fund of knowledge is appropriate for level of education  Cranial Nerves  CN II: Visual acuity is normal  Visual fields full to confrontation  CN III, IV, VI: Extraocular movements intact bilaterally  Normal lids and orbits bilaterally  Pupils equal round and reactive to light bilaterally  CN V: Facial sensation is normal   CN VII: Full and symmetric facial movement  CN VIII: Hearing is normal   CN IX, X: Palate elevates symmetrically  Normal gag reflex  CN XI: Shoulder shrug strength is normal   CN XII: Tongue midline without atrophy or fasciculations  Motor  Normal muscle bulk throughout  Normal muscle tone  No abnormal involuntary movements  Strength is 5/5 throughout all four extremities  Sensory  Light touch abnormality: Temperature abnormality: Vibration abnormality: Proprioception is normal in upper and lower extremities  Sensation: Increased sensitivities noted in the varying laterality is        Reflexes  Deep tendon reflexes are 2+ and symmetric in all four extremities with downgoing toes bilaterally  Deep tendon reflexes: No hyperreflexia noted on this exam     Right pathological reflexes: Malik's absent  Left pathological reflexes: Malik's absent  Coordination  Right: Finger-to-nose normal  Rapid alternating movement normal  Heel-to-shin normal   Left: Finger-to-nose normal  Rapid alternating movement normal  Heel-to-shin normal     Gait  Casual gait: Wide stance  Spastic gait  Toe walking abnormality: Heel walking abnormality: Tandem gait abnormality: Romberg is absent  Able to rise from chair without using arms  Gait pattern with a more spastic mobility  Adequate strength noted to her legs bilaterally for ambulation    Patient had difficulty with heel toe walking imbalance and unable to fully perform the testing  Deferred this any further and deferred tandem gait  Romberg was absent           ROS:    Review of Systems   Constitutional: Positive for fatigue  Negative for appetite change and fever  HENT: Negative  Negative for hearing loss, tinnitus, trouble swallowing and voice change  Eyes: Negative  Negative for photophobia and pain  Respiratory: Positive for shortness of breath  Cardiovascular: Positive for chest pain (on right side)  Negative for palpitations  Gastrointestinal: Negative  Negative for nausea and vomiting  Endocrine: Negative  Negative for cold intolerance  Genitourinary: Positive for urgency  Negative for dysuria and frequency  Musculoskeletal: Positive for arthralgias, back pain, myalgias, neck pain and neck stiffness  Skin: Negative  Negative for rash  Neurological: Positive for dizziness, weakness (in left arm), numbness and headaches  Negative for tremors, seizures, syncope, facial asymmetry, speech difficulty and light-headedness  Hematological: Negative  Does not bruise/bleed easily  Psychiatric/Behavioral: Positive for decreased concentration  Negative for confusion, hallucinations and sleep disturbance  The patient is nervous/anxious  ROS reviewed and discussed with patient    I have spent 65 minutes with Patient  today in which greater than 50% of this time was spent in counseling/coordination of care regarding Diagnostic results, Risks and benefits of tx options, Intructions for management, Patient and family education, Impressions and overall counselling with re: to s/s and diagnostic testings

## 2021-03-10 NOTE — PATIENT INSTRUCTIONS
Increase Gabapentin to 600mg 3 times a day  Will d/w psychiatry change from Cymbalta to Nortriptyline vs Effexor (venalofaxine)  Will do EMG of arms and legs to rule out or in neuropathies vs radicular symptoms-scheduled July 22 and July 29th  Follow up with Neurosurgery re: MRI findings  Discuss with Therapist adding or increasing therapies to consist of Cognitive Behavioral Therapies  Continue with Midodrine 10mg three times a day    Follow up with Neurology office in May with Dr Marisol Dorado or after EMGs with provider of your choice

## 2021-03-11 ENCOUNTER — OFFICE VISIT (OUTPATIENT)
Dept: SURGERY | Facility: CLINIC | Age: 66
End: 2021-03-11
Payer: COMMERCIAL

## 2021-03-11 ENCOUNTER — TRANSCRIBE ORDERS (OUTPATIENT)
Dept: SURGERY | Facility: CLINIC | Age: 66
End: 2021-03-11

## 2021-03-11 VITALS — WEIGHT: 164 LBS | BODY MASS INDEX: 28 KG/M2 | HEIGHT: 64 IN

## 2021-03-11 DIAGNOSIS — L98.9 SKIN LESION OF RIGHT LOWER EXTREMITY: Primary | ICD-10-CM

## 2021-03-11 PROCEDURE — 99242 OFF/OP CONSLTJ NEW/EST SF 20: CPT | Performed by: SURGERY

## 2021-03-11 PROCEDURE — 88304 TISSUE EXAM BY PATHOLOGIST: CPT | Performed by: PATHOLOGY

## 2021-03-11 PROCEDURE — 3008F BODY MASS INDEX DOCD: CPT | Performed by: NURSE PRACTITIONER

## 2021-03-11 PROCEDURE — 11403 EXC TR-EXT B9+MARG 2.1-3CM: CPT | Performed by: SURGERY

## 2021-03-11 NOTE — PROGRESS NOTES
Assessment/Plan:    Diagnoses and all orders for this visit:    Skin lesion of right lower extremity     right posterior thigh skin lesion  Suspect this is sebaceous cyst in nature  Removed here in the office with her permission under local   Will see back in the office in 2 weeks for suture removal   Local wound care instructions given    Subjective:      Patient ID: Nadeem Ontiveros is a 60 y o  female  Patient presents for evaluation of a skin lesion on the back of her right thigh  She has had the skin lesion for 3 months  The lesion is sore and has drained a few times  Heavy using subtype of antibiotic cream without resolution of the lesion  The following portions of the patient's history were reviewed and updated as appropriate:     She  has a past medical history of Abdominal adhesions, Anesthesia complication, Anxiety, Depression, Depression, Disease of thyroid gland, Fibromyalgia, GERD (gastroesophageal reflux disease), History of cholecystectomy (9/7/2019), Lazy eye, Melanoma ([facility] ) (2010), Osteopenia, Psychiatric disorder, Tinnitus, and Wears glasses  She  has a past surgical history that includes Appendectomy; Cholecystectomy; Right oophorectomy; Abdominal surgery; Dilation and curettage of uterus; [place] tooth extraction; NEUROMA EXCISION (Right, 5/26/2017); Cervical fusion; and Pars plana vitrectomy w/ repair of macular hole (12/23/2020)  Her family history includes Arthritis in her mother; COPD in her mother; Heart disease in her mother; Hypertension in her father; Kidney disease in her brother; No Known Problems in her maternal aunt, maternal grandfather, maternal grandmother, maternal uncle, paternal aunt, paternal grandfather, paternal grandmother, paternal uncle, and sister; Stroke (age of onset: 58) in her mother  She  reports that she has never smoked  She has never used smokeless tobacco  She reports previous alcohol use  She reports that she does not use drugs    Current Outpatient Medications   Medication Sig Dispense Refill    albuterol (PROVENTIL HFA,VENTOLIN HFA) 90 mcg/act inhaler Inhale 2 puffs every 4 (four) hours as needed for wheezing 1 Inhaler 0    atorvastatin (LIPITOR) 20 mg tablet Take 20 mg by mouth daily   0    baclofen 10 mg tablet Take 1 tablet (10 mg total) by mouth 3 (three) times a day (Patient not taking: Reported on 3/10/2021) 30 tablet 0    calcium carbonate-vitamin D (OSCAL-D) 500 mg-200 units per tablet Take 1 tablet by mouth daily with breakfast 30 tablet 0    cephalexin (KEFLEX) 500 mg capsule take 1 capsule by mouth twice a day for 10 days      diphenhydrAMINE (BENADRYL) 50 mg capsule Take 50 mg by mouth every 6 (six) hours as needed for itching      docusate sodium (COLACE) 100 mg capsule Take 1 capsule (100 mg total) by mouth 2 (two) times a day (Patient taking differently: Take 100 mg by mouth daily ) 10 capsule 0    DULoxetine (CYMBALTA) 60 mg delayed release capsule Take 1 capsule (60 mg total) by mouth 2 (two) times a day 60 capsule 0    gabapentin (NEURONTIN) 300 mg capsule Take 2 capsules (600 mg total) by mouth 3 (three) times a day 90 capsule 3    ipratropium-albuterol (DUO-NEB) 0 5-2 5 mg/3 mL nebulizer solution Take 1 vial (3 mL total) by nebulization 4 (four) times a day 100 mL 0    levothyroxine 50 mcg tablet Take 1 tablet (50 mcg total) by mouth daily 90 tablet 0    meclizine (ANTIVERT) 25 mg tablet take 1 tablet by mouth every 6 hours if needed  IF DIZZINESS  0    midodrine (PROAMATINE) 10 MG tablet Take 1 tablet (10 mg total) by mouth 3 (three) times a day 90 tablet 2    naltrexone (REVIA) 50 mg tablet Take 1 tablet (50 mg total) by mouth daily 30 tablet 2    oxyCODONE-acetaminophen (PERCOCET) 5-325 mg per tablet Take 1 tablet by mouth every 4 (four) hours as needed for severe pain for up to 10 daysMax Daily Amount: 6 tablets 12 tablet 0    pantoprazole (PROTONIX) 40 mg tablet Take 1 tablet (40 mg total) by mouth daily 60 tablet 2  polyethylene glycol (MIRALAX) 17 g packet Take 17 g by mouth 2 (two) times a day  0    prednisoLONE acetate (PRED FORTE) 1 % ophthalmic suspension instill 1 drop INTO AFFECTED EYE(S) four times a day as directed BEGIN DROPS MORNING AFTER SURGERY      QUEtiapine (SEROquel) 100 mg tablet Take 1 tablet (100 mg total) by mouth daily at bedtime Take in addition to 25 mg tablet for a total dose of 125 mg at bedtime (Patient taking differently: Take 100 mg by mouth daily at bedtime ) 45 tablet 0    traZODone (DESYREL) 100 mg tablet Take 1 tablet (100 mg total) by mouth daily at bedtime 30 tablet 2     No current facility-administered medications for this visit  She is allergic to demerol [meperidine] and sulfa antibiotics       Review of Systems      Objective:      Ht 5' 4" (1 626 m)   Wt 74 4 kg (164 lb)   LMP  (LMP Unknown)   BMI 28 15 kg/m²          Physical Exam  Skin:     General: Skin is warm and dry  Comments: 2 cm area of inflammation of the mid posterior right thigh  Appears to have a central poor  No active infection on today's examination  Risks and benefits for removal under local here in the office were discussed with her and she agreed to proceed  Skin excision    Date/Time: 3/11/2021 11:01 AM  Performed by: Audrey Niño DO  Authorized by: Audrey Niño DO   Universal Protocol:  Consent: Verbal consent obtained  Risks and benefits: risks, benefits and alternatives were discussed  Consent given by: patient  Time out: Immediately prior to procedure a "time out" was called to verify the correct patient, procedure, equipment, support staff and site/side marked as required    Patient understanding: patient states understanding of the procedure being performed  Patient identity confirmed: verbally with patient      Procedure Details - Skin Excision:     Number of Lesions:  1  Lesion 1:     Body area:  Lower extremity    Lower extremity location:  R upper leg    Initial size (mm):  22       Final defect size (mm):  30    Malignancy: benign lesion      Closure complexity: intermediate      Surgical defect:   30 mm     Repair Comments:  Verbal consent was obtained from the patient  Alcohol was used to cleanse the area of the right posterior thigh  1% lidocaine with epinephrine was used to infiltrate skin surrounding subcutaneous tissues  Elliptical skin incision was made with no specific margin to encompass the lesion  This was taken full-thickness skin down to normal-appearing subcutaneous fat  There was excellent hemostasis  Three 0 Vicryl was used to reapproximate the subcutaneous tissues in a simple fashion  Skin was closing 4 0 nylon in a running mattress fashion  Wound was washed and dried  Sterile gauze was applied  She tolerated well

## 2021-03-11 NOTE — LETTER
March 11, 2021     Mckenna Henderson MD  [address]  [address] [place] [address]    Patient: Ana María Saldivar   YOB: 1955   Date of Visit: 3/11/2021       Dear Dr Khadijah Laird:    Thank you for referring Ana María Saldivar to me for evaluation  Below are my notes for this consultation  If you have questions, please do not hesitate to call me  I look forward to following your patient along with you  Sincerely,        Yakelin Horvath DO        CC: MD Yakelin Gonzalez DO  3/11/2021 11:03 AM  Sign when Signing Visit  Assessment/Plan:    Diagnoses and all orders for this visit:    Skin lesion of right lower extremity     right posterior thigh skin lesion  Suspect this is sebaceous cyst in nature  Removed here in the office with her permission under local   Will see back in the office in 2 weeks for suture removal   Local wound care instructions given    Subjective:      Patient ID: Ana María Saldivar is a 60 y o  female  Patient presents for evaluation of a skin lesion on the back of her right thigh  She has had the skin lesion for 3 months  The lesion is sore and has drained a few times  Heavy using subtype of antibiotic cream without resolution of the lesion  The following portions of the patient's history were reviewed and updated as appropriate:     She  has a past medical history of Abdominal adhesions, Anesthesia complication, Anxiety, Depression, Depression, Disease of thyroid gland, Fibromyalgia, GERD (gastroesophageal reflux disease), History of cholecystectomy (9/7/2019), Lazy eye, Melanoma ([facility] ) (2010), Osteopenia, Psychiatric disorder, Tinnitus, and Wears glasses  She  has a past surgical history that includes Appendectomy; Cholecystectomy; Right oophorectomy; Abdominal surgery; Dilation and curettage of uterus; [place] tooth extraction; NEUROMA EXCISION (Right, 5/26/2017);  Cervical fusion; and Pars plana vitrectomy w/ repair of macular hole (12/23/2020)  Her family history includes Arthritis in her mother; COPD in her mother; Heart disease in her mother; Hypertension in her father; Kidney disease in her brother; No Known Problems in her maternal aunt, maternal grandfather, maternal grandmother, maternal uncle, paternal aunt, paternal grandfather, paternal grandmother, paternal uncle, and sister; Stroke (age of onset: 59) in her mother  She  reports that she has never smoked  She has never used smokeless tobacco  She reports previous alcohol use  She reports that she does not use drugs    Current Outpatient Medications   Medication Sig Dispense Refill    albuterol (PROVENTIL HFA,VENTOLIN HFA) 90 mcg/act inhaler Inhale 2 puffs every 4 (four) hours as needed for wheezing 1 Inhaler 0    atorvastatin (LIPITOR) 20 mg tablet Take 20 mg by mouth daily   0    baclofen 10 mg tablet Take 1 tablet (10 mg total) by mouth 3 (three) times a day (Patient not taking: Reported on 3/10/2021) 30 tablet 0    calcium carbonate-vitamin D (OSCAL-D) 500 mg-200 units per tablet Take 1 tablet by mouth daily with breakfast 30 tablet 0    cephalexin (KEFLEX) 500 mg capsule take 1 capsule by mouth twice a day for 10 days      diphenhydrAMINE (BENADRYL) 50 mg capsule Take 50 mg by mouth every 6 (six) hours as needed for itching      docusate sodium (COLACE) 100 mg capsule Take 1 capsule (100 mg total) by mouth 2 (two) times a day (Patient taking differently: Take 100 mg by mouth daily ) 10 capsule 0    DULoxetine (CYMBALTA) 60 mg delayed release capsule Take 1 capsule (60 mg total) by mouth 2 (two) times a day 60 capsule 0    gabapentin (NEURONTIN) 300 mg capsule Take 2 capsules (600 mg total) by mouth 3 (three) times a day 90 capsule 3    ipratropium-albuterol (DUO-NEB) 0 5-2 5 mg/3 mL nebulizer solution Take 1 vial (3 mL total) by nebulization 4 (four) times a day 100 mL 0    levothyroxine 50 mcg tablet Take 1 tablet (50 mcg total) by mouth daily 90 tablet 0    meclizine (ANTIVERT) 25 mg tablet take 1 tablet by mouth every 6 hours if needed  IF DIZZINESS  0    midodrine (PROAMATINE) 10 MG tablet Take 1 tablet (10 mg total) by mouth 3 (three) times a day 90 tablet 2    naltrexone (REVIA) 50 mg tablet Take 1 tablet (50 mg total) by mouth daily 30 tablet 2    oxyCODONE-acetaminophen (PERCOCET) 5-325 mg per tablet Take 1 tablet by mouth every 4 (four) hours as needed for severe pain for up to 10 daysMax Daily Amount: 6 tablets 12 tablet 0    pantoprazole (PROTONIX) 40 mg tablet Take 1 tablet (40 mg total) by mouth daily 60 tablet 2    polyethylene glycol (MIRALAX) 17 g packet Take 17 g by mouth 2 (two) times a day  0    prednisoLONE acetate (PRED FORTE) 1 % ophthalmic suspension instill 1 drop INTO AFFECTED EYE(S) four times a day as directed BEGIN DROPS MORNING AFTER SURGERY      QUEtiapine (SEROquel) 100 mg tablet Take 1 tablet (100 mg total) by mouth daily at bedtime Take in addition to 25 mg tablet for a total dose of 125 mg at bedtime (Patient taking differently: Take 100 mg by mouth daily at bedtime ) 45 tablet 0    traZODone (DESYREL) 100 mg tablet Take 1 tablet (100 mg total) by mouth daily at bedtime 30 tablet 2     No current facility-administered medications for this visit  She is allergic to demerol [meperidine] and sulfa antibiotics       Review of Systems      Objective:      Ht 5' 4" (1 626 m)   Wt 74 4 kg (164 lb)   LMP  (LMP Unknown)   BMI 28 15 kg/m²          Physical Exam  Skin:     General: Skin is warm and dry  Comments: 2 cm area of inflammation of the mid posterior right thigh  Appears to have a central poor  No active infection on today's examination  Risks and benefits for removal under local here in the office were discussed with her and she agreed to proceed      Skin excision    Date/Time: 3/11/2021 11:01 AM  Performed by: Josette Kumar DO  Authorized by: Josette Kumar DO   Universal Protocol:  Consent: Verbal consent obtained  Risks and benefits: risks, benefits and alternatives were discussed  Consent given by: patient  Time out: Immediately prior to procedure a "time out" was called to verify the correct patient, procedure, equipment, support staff and site/side marked as required  Patient understanding: patient states understanding of the procedure being performed  Patient identity confirmed: verbally with patient      Procedure Details - Skin Excision:     Number of Lesions:  1  Lesion 1:     Body area:  Lower extremity    Lower extremity location:  R upper leg    Initial size (mm):  22       Final defect size (mm):  30    Malignancy: benign lesion      Closure complexity: intermediate      Surgical defect:   30 mm     Repair Comments:  Verbal consent was obtained from the patient  Alcohol was used to cleanse the area of the right posterior thigh  1% lidocaine with epinephrine was used to infiltrate skin surrounding subcutaneous tissues  Elliptical skin incision was made with no specific margin to encompass the lesion  This was taken full-thickness skin down to normal-appearing subcutaneous fat  There was excellent hemostasis  Three 0 Vicryl was used to reapproximate the subcutaneous tissues in a simple fashion  Skin was closing 4 0 nylon in a running mattress fashion  Wound was washed and dried  Sterile gauze was applied  She tolerated well

## 2021-03-12 ENCOUNTER — TELEPHONE (OUTPATIENT)
Dept: NEUROLOGY | Facility: CLINIC | Age: 66
End: 2021-03-12

## 2021-03-12 NOTE — TELEPHONE ENCOUNTER
Patient left voicemail requesting letter to be drawn up for most recent diagnoses of radiculopathy of lumbar region so she can submit to SSD  She has had SSD (social security disability) in the past, benefits have been suspended since she has been working  She would like to have her SSD benefits re-instated  Cell phone for patient is best contact number

## 2021-03-14 ENCOUNTER — TELEPHONE (OUTPATIENT)
Dept: OTHER | Facility: OTHER | Age: 66
End: 2021-03-14

## 2021-03-14 ENCOUNTER — TELEPHONE (OUTPATIENT)
Dept: OTHER | Facility: HOSPITAL | Age: 66
End: 2021-03-14

## 2021-03-14 NOTE — TELEPHONE ENCOUNTER
Msg: "The area is still sore, where I received surgery on 3/11  I had a cyst removed on my thigh  I have several stitches and the site is red, which it was not before  The area feels larger, like the cyst is still there   I would like to talk to the doctor "

## 2021-03-14 NOTE — QUICK NOTE
Return phone call through the answering service  Having pain and some mild redness around the sutures that were placed 3 days ago  No fevers  No drainage  Explained what she described was normal finding 3 days status post removal of the cyst   Continue with ice packs    If she continues with issue she may call the office later this week

## 2021-03-16 NOTE — TELEPHONE ENCOUNTER
Patient made aware that you would like all orders performed before the letter  She verbalized understanding  Advised patient to call Dr Sim Shin and inquire about medications, she will do that  No further questions from patient at this time

## 2021-03-19 ENCOUNTER — TREATMENT (OUTPATIENT)
Dept: OTHER | Facility: HOSPITAL | Age: 66
End: 2021-03-19

## 2021-03-19 DIAGNOSIS — L03.90 CELLULITIS: Primary | ICD-10-CM

## 2021-03-19 RX ORDER — CEPHALEXIN 500 MG/1
500 CAPSULE ORAL EVERY 6 HOURS SCHEDULED
Qty: 20 CAPSULE | Refills: 0 | Status: SHIPPED | OUTPATIENT
Start: 2021-03-19 | End: 2021-03-24

## 2021-03-19 NOTE — PROGRESS NOTES
Patient had right posterior thigh cyst removed 1 week ago  Called today complaining of drainage and increased inflammation  Asked to see her in the office  On examination appears to be more of a tissue reaction to the sutures  There is some serous drainage from each of the holes from the nylon sutures  Sutures removed here in the office  A single Steri-Strip was applied  Areas cleansed with alcohol  Due to some increasing erythema I will order Keflex 4 times a day for 5 days  May wash with soap and water  I asked her to keep next week's appointment for re-evaluation

## 2021-03-23 DIAGNOSIS — M62.838 MUSCLE SPASM: ICD-10-CM

## 2021-03-24 RX ORDER — BACLOFEN 10 MG/1
TABLET ORAL
Qty: 30 TABLET | Refills: 0 | Status: ON HOLD | OUTPATIENT
Start: 2021-03-24 | End: 2021-07-20 | Stop reason: SDUPTHER

## 2021-03-25 ENCOUNTER — OFFICE VISIT (OUTPATIENT)
Dept: SURGERY | Facility: CLINIC | Age: 66
End: 2021-03-25

## 2021-03-25 DIAGNOSIS — L98.9 SKIN LESION OF RIGHT LOWER EXTREMITY: Primary | ICD-10-CM

## 2021-03-25 PROCEDURE — 99024 POSTOP FOLLOW-UP VISIT: CPT | Performed by: SURGERY

## 2021-03-25 NOTE — PROGRESS NOTES
Assessment/Plan:    Diagnoses and all orders for this visit:    Skin lesion of right lower extremity    Ultimate pathology was an epidermal cyst   Did have a localized reaction to the sutures which were removed last week  Much improved today  Will continue to heal over the next several weeks  Asked her to call with questions or concerns    Pathology: Reviewed with patient, all questions answered  Postoperative restrictions reviewed  All questions answered  ______________________________________________________  HPI: Patients presents post operatively  S/P excision right posterior thigh skin lesion 3/11/2021  Final Diagnosis  A  Skin lesion, Right Thigh, excision:  - Ruptured epidermal (infundibular) cyst(s) with foreign body giant cell reaction to keratinous debris,     abscess, acute and chronically inflamed granulation tissue and scar  See Note  Electronically signed by Denisse Bhardwaj MD on 3/16/2021 at  1:05 PM  Note   The histologic findings may be seen in hidradenitis suppurativa  Clinical correlation is required  ROS:  General ROS: negative for - chills, fatigue, fever or night sweats, weight loss  Respiratory ROS: no cough, shortness of breath, or wheezing  Cardiovascular ROS: no chest pain or dyspnea on exertion  Genito-Urinary ROS: no dysuria, trouble voiding, or hematuria  Musculoskeletal ROS: negative for - gait disturbance, joint pain or muscle pain  Neurological ROS: no TIA or stroke symptoms  GI ROS: see HPI  Skin ROS: no new rashes or lesions   Lymphatic ROS: no new adenopathy noted by pt     GYN ROS: see HPI, no new GYN history or bleeding noted  Psy ROS: no new mental or behavioral disturbances         Patient Active Problem List   Diagnosis    [facility] dependence [facility] ([facility] )    Allergic rhinitis    Arthropathy of multiple sites    Moderate episode of recurrent major depressive disorder (HCC)    Esophagitis    Irritable bowel syndrome    Raynaud's syndrome without gangrene    Osteopenia    Radiculopathy of lumbar region    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[facility]    Focal neurological deficit    Gastroesophageal reflux disease with esophagitis without hemorrhage    Cervical myelopathy with cervical radiculopathy    History of recent intraspinal surgery    Orthostatic hypotension    History of migraine headaches    Mixed dyslipidemia    Lesion of lachelle    Right sided temporal headache    Fibromyalgia    ELLY (generalized anxiety disorder)    Herniated cervical disc    Melanoma ([facility] )    Spinal stenosis    BMI 26 0-26 9,adult    Psychogenic weakness    History of cholecystectomy    Ambulatory dysfunction    Adult hypothyroidism    Dizziness and giddiness    Migraine without aura and without status migrainosus, not intractable    Syncope    History of vertigo    Epigastric abdominal pain    Abdominal pain    Leg weakness, bilateral    Depression    Gastroesophageal reflux disease    Mild asthma    Neuropathy       Allergies:  Demerol [meperidine] and Sulfa antibiotics      Current Outpatient Medications:     albuterol (PROVENTIL HFA,VENTOLIN HFA) 90 mcg/act inhaler, Inhale 2 puffs every 4 (four) hours as needed for wheezing, Disp: 1 Inhaler, Rfl: 0    atorvastatin (LIPITOR) 20 mg tablet, Take 20 mg by mouth daily , Disp: , Rfl: 0    baclofen 10 mg tablet, take 1 tablet by mouth three times a day, Disp: 30 tablet, Rfl: 0    calcium carbonate-vitamin D (OSCAL-D) 500 mg-200 units per tablet, Take 1 tablet by mouth daily with breakfast, Disp: 30 tablet, Rfl: 0    cephalexin (KEFLEX) 500 mg capsule, take 1 capsule by mouth twice a day for 10 days, Disp: , Rfl:     diphenhydrAMINE (BENADRYL) 50 mg capsule, Take 50 mg by mouth every 6 (six) hours as needed for itching, Disp: , Rfl:     docusate sodium (COLACE) 100 mg capsule, Take 1 capsule (100 mg total) by mouth 2 (two) times a day (Patient taking differently: Take 100 mg by mouth daily ), Disp: 10 capsule, Rfl: 0    DULoxetine (CYMBALTA) 60 mg delayed release capsule, Take 1 capsule (60 mg total) by mouth 2 (two) times a day, Disp: 60 capsule, Rfl: 0    gabapentin (NEURONTIN) 300 mg capsule, Take 2 capsules (600 mg total) by mouth 3 (three) times a day, Disp: 90 capsule, Rfl: 3    ipratropium-albuterol (DUO-NEB) 0 5-2 5 mg/3 mL nebulizer solution, Take 1 vial (3 mL total) by nebulization 4 (four) times a day, Disp: 100 mL, Rfl: 0    levothyroxine 50 mcg tablet, Take 1 tablet (50 mcg total) by mouth daily, Disp: 90 tablet, Rfl: 0    meclizine (ANTIVERT) 25 mg tablet, take 1 tablet by mouth every 6 hours if needed  IF DIZZINESS, Disp: , Rfl: 0    midodrine (PROAMATINE) 10 MG tablet, Take 1 tablet (10 mg total) by mouth 3 (three) times a day, Disp: 90 tablet, Rfl: 2    naltrexone (REVIA) 50 mg tablet, Take 1 tablet (50 mg total) by mouth daily, Disp: 30 tablet, Rfl: 2    pantoprazole (PROTONIX) 40 mg tablet, Take 1 tablet (40 mg total) by mouth daily, Disp: 60 tablet, Rfl: 2    polyethylene glycol (MIRALAX) 17 g packet, Take 17 g by mouth 2 (two) times a day, Disp:  , Rfl: 0    prednisoLONE acetate (PRED FORTE) 1 % ophthalmic suspension, instill 1 drop INTO AFFECTED EYE(S) four times a day as directed BEGIN DROPS MORNING AFTER SURGERY, Disp: , Rfl:     QUEtiapine (SEROquel) 100 mg tablet, Take 1 tablet (100 mg total) by mouth daily at bedtime Take in addition to 25 mg tablet for a total dose of 125 mg at bedtime (Patient taking differently: Take 100 mg by mouth daily at bedtime ), Disp: 45 tablet, Rfl: 0    traZODone (DESYREL) 100 mg tablet, Take 1 tablet (100 mg total) by mouth daily at bedtime, Disp: 30 tablet, Rfl: 2    Past Medical History:   Diagnosis Date    Abdominal adhesions     Anesthesia complication     difficult to wake up    Anxiety     Depression     Depression     Disease of thyroid gland     Fibromyalgia     GERD (gastroesophageal reflux disease)     History of cholecystectomy 9/7/2019    Lazy eye     resolved: 3/27/17    Mel[facility] ([facility] ) 2010    Osteopenia     with joint pain-elevated DEV    Psychiatric disorder     Tinnitus     Wears glasses     for reading       Past Surgical History:   Procedure Laterality Date    ABDOMINAL SURGERY      lysis of adhesions x 2    APPENDECTOMY      CERVICAL FUSION      CHOLECYSTECTOMY      open    DILATION AND CURETTAGE OF UTERUS      NEUROMA EXCISION Right 5/26/2017    Procedure: EXCISION MASS / FIBROMA FOOT;  Surgeon: Jessica Santiago DPM;  Location: [address] [place] [address];  Service:     PARS PLANA VITRECTOMY W/ REPAIR OF MACULAR HOLE  12/23/2020    RIGHT OOPHORECTOMY      WISDOM TOOTH EXTRACTION      x4       Family History   Problem Relation Age of Onset    Heart disease Mother     Stroke Mother 58    COPD Mother     Arthritis Mother     Hypertension Father     Kidney disease Brother         kidney transplant    No Known Problems Sister     No Known Problems Maternal Aunt     No Known Problems Maternal Uncle     No Known Problems Paternal Aunt     No Known Problems Paternal Uncle     No Known Problems Maternal Grandmother     No Known Problems Maternal Grandfather     No Known Problems Paternal Grandmother     No Known Problems Paternal Grandfather     ADD / ADHD Neg Hx     Anesthesia problems Neg Hx     Cancer Neg Hx     Clotting disorder Neg Hx     Collagen disease Neg Hx     Diabetes Neg Hx     Dislocations Neg Hx     Learning disabilities Neg Hx     Neurological problems Neg Hx     Osteoporosis Neg Hx     Rheumatologic disease Neg Hx     Scoliosis Neg Hx     Vascular Disease Neg Hx         reports that she has never smoked  She has never used smokeless tobacco  She reports previous alcohol use  She reports that she does not use drugs  PHYSICAL EXAM    LMP  (LMP Unknown)     General: normal, cooperative, no distress    Incision: clean, dry, and intact and healing well    Small amount of pink discoloration but much improved compared to last week's visit  No further drainage        Ludwin Grey,     Date: 3/25/2021 Time: 2:07 PM

## 2021-03-26 ENCOUNTER — TELEMEDICINE (OUTPATIENT)
Dept: FAMILY MEDICINE CLINIC | Facility: CLINIC | Age: 66
End: 2021-03-26
Payer: COMMERCIAL

## 2021-03-26 DIAGNOSIS — R53.83 FATIGUE, UNSPECIFIED TYPE: Primary | ICD-10-CM

## 2021-03-26 PROCEDURE — 1036F TOBACCO NON-USER: CPT | Performed by: NURSE PRACTITIONER

## 2021-03-26 PROCEDURE — 99213 OFFICE O/P EST LOW 20 MIN: CPT | Performed by: NURSE PRACTITIONER

## 2021-03-26 NOTE — PROGRESS NOTES
COVID-19 Virtual Visit     Assessment/Plan:    Problem List Items Addressed This Visit     None      Visit Diagnoses     Fatigue, unspecified type    -  Primary         Disposition:     After clarifying the patient's history, my suspicion for COVID-19 infection is very low  She will monitor her symptoms and f/u for anything new or if symptoms persist    I have spent 8 minutes directly with the patient  Encounter provider SHRAVAN Alvarado    Provider located at [address] [id number]    Recent Visits  No visits were found meeting these conditions  Showing recent visits within past 7 days and meeting all other requirements     Today's Visits  Date Type Provider Dept   03/26/21 Telemedicine Jerman Alvarado today's visits and meeting all other requirements     Future Appointments  No visits were found meeting these conditions  Showing future appointments within next 150 days and meeting all other requirements      This virtual check-in was done via [employer] and patient was informed that this is not a secure, HIPAA-compliant platform  She agrees to proceed  Patient agrees to participate in a virtual check in via telephone or video visit instead of presenting to the office to address urgent/immediate medical needs  Patient is aware this is a billable service  After connecting through [facility], the patient was identified by name and date of birth  Danae Calhoun was informed that this was a telemedicine visit and that the exam was being conducted confidentially over secure lines  My office door was closed  No one else was in the room  Danae Calhoun acknowledged consent and understanding of privacy and security of the telemedicine visit  I informed the patient that I have reviewed her record in Epic and presented the opportunity for her to ask any questions regarding the visit today  The patient agreed to participate  Subjective:   Efraín Delgado is a 60 y o  female who is concerned about COVID-19  Patient's symptoms include fever (tactile, actual temperature is normal), chills, fatigue, rhinorrhea and headache  Patient denies malaise, congestion, sore throat, anosmia, loss of taste, cough, shortness of breath, chest tightness, abdominal pain, nausea, vomiting, diarrhea and myalgias  Date of symptom onset: 3/25/2021    Exposure:   Contact with a person who is under investigation (PUI) for or who is positive for COVID-19 within the last 14 days?: No    Hospitalized recently for fever and/or lower respiratory symptoms?: No      Currently a healthcare worker that is involved in direct patient care?: No      Works in a special setting where the risk of COVID-19 transmission may be high? (this may include long-term care, correctional and group home facilities; homeless shelters; assisted-living facilities and group homes ): No      Resident in a special setting where the risk of COVID-19 transmission may be high? (this may include long-term care, correctional and group home facilities; homeless shelters; assisted-living facilities and group homes ): No      She has been on Keflex for an infection on her leg after a cyst was removed  She is waving waves of sweats and feels tired, but otherwise has no symptoms       Lab Results   Component Value Date    SARSCOV2 Negative 02/03/2021    SARSCOV2 Not Detected 10/29/2020     Past Medical History:   Diagnosis Date    Abdominal adhesions     Anesthesia complication     difficult to wake up    Anxiety     Depression     Depression     Disease of thyroid gland     Fibromyalgia     GERD (gastroesophageal reflux disease)     History of cholecystectomy 9/7/2019    Lazy eye     resolved: 3/27/17    Melanoma ([facility] [address] ) 2010    Osteopenia     with joint pain-elevated DEV    Psychiatric disorder     Tinnitus     Wears glasses     for reading     Past Surgical History:   Procedure Laterality Date    ABDOMINAL SURGERY      lysis of adhesions x 2    APPENDECTOMY      CERVICAL FUSION      CHOLECYSTECTOMY      open    DILATION AND CURETTAGE OF UTERUS      NEUROMA EXCISION Right 5/26/2017    Procedure: EXCISION MASS / FIBROMA FOOT;  Surgeon: Mitra Knott DPM;  Location: [address] [place] [address];  Service:     PARS PLANA VITRECTOMY W/ REPAIR OF MACULAR HOLE  12/23/2020    RIGHT OOPHORECTOMY      WISDOM TOOTH EXTRACTION      x4     Current Outpatient Medications   Medication Sig Dispense Refill    albuterol (PROVENTIL HFA,VENTOLIN HFA) 90 mcg/act inhaler Inhale 2 puffs every 4 (four) hours as needed for wheezing 1 Inhaler 0    atorvastatin (LIPITOR) 20 mg tablet Take 20 mg by mouth daily   0    baclofen 10 mg tablet take 1 tablet by mouth three times a day 30 tablet 0    calcium carbonate-vitamin D (OSCAL-D) 500 mg-200 units per tablet Take 1 tablet by mouth daily with breakfast 30 tablet 0    cephalexin (KEFLEX) 500 mg capsule take 1 capsule by mouth twice a day for 10 days      diphenhydrAMINE (BENADRYL) 50 mg capsule Take 50 mg by mouth every 6 (six) hours as needed for itching      docusate sodium (COLACE) 100 mg capsule Take 1 capsule (100 mg total) by mouth 2 (two) times a day (Patient taking differently: Take 100 mg by mouth daily ) 10 capsule 0    DULoxetine (CYMBALTA) 60 mg delayed release capsule Take 1 capsule (60 mg total) by mouth 2 (two) times a day 60 capsule 0    gabapentin (NEURONTIN) 300 mg capsule Take 2 capsules (600 mg total) by mouth 3 (three) times a day 90 capsule 3    ipratropium-albuterol (DUO-NEB) 0 5-2 5 mg/3 mL nebulizer solution Take 1 vial (3 mL total) by nebulization 4 (four) times a day 100 mL 0    levothyroxine 50 mcg tablet Take 1 tablet (50 mcg total) by mouth daily 90 tablet 0    meclizine (ANTIVERT) 25 mg tablet take 1 tablet by mouth every 6 hours if needed  IF DIZZINESS  0    midodrine (PROAMATINE) 10 MG tablet Take 1 tablet (10 mg total) by mouth 3 (three) times a day 90 tablet 2    naltrexone (REVIA) 50 mg tablet Take 1 tablet (50 mg total) by mouth daily 30 tablet 2    pantoprazole (PROTONIX) 40 mg tablet Take 1 tablet (40 mg total) by mouth daily 60 tablet 2    polyethylene glycol (MIRALAX) 17 g packet Take 17 g by mouth 2 (two) times a day  0    QUEtiapine (SEROquel) 100 mg tablet Take 1 tablet (100 mg total) by mouth daily at bedtime Take in addition to 25 mg tablet for a total dose of 125 mg at bedtime (Patient taking differently: Take 100 mg by mouth daily at bedtime ) 45 tablet 0    traZODone (DESYREL) 100 mg tablet Take 1 tablet (100 mg total) by mouth daily at bedtime 30 tablet 2     No current facility-administered medications for this visit  Allergies   Allergen Reactions    Demerol [Meperidine] Lightheadedness     Reaction Date: 11Jan2006;     Sulfa Antibiotics Hives     Reaction Date: 11Jan2006;        Review of Systems   Constitutional: Positive for chills, fatigue and fever (tactile, actual temperature is normal)  HENT: Positive for rhinorrhea  Negative for congestion and sore throat  Respiratory: Negative for cough, chest tightness and shortness of breath  Gastrointestinal: Negative for abdominal pain, diarrhea, nausea and vomiting  Musculoskeletal: Negative for myalgias  Neurological: Positive for headaches  Objective: There were no vitals filed for this visit  Physical Exam  Constitutional:       General: She is not in acute distress  Appearance: She is well-developed  She is not diaphoretic  Eyes:      Conjunctiva/sclera: Conjunctivae normal    Pulmonary:      Effort: Pulmonary effort is normal  No respiratory distress  Neurological:      Mental Status: She is alert     Psychiatric:         Mood and Affect: Mood normal          Behavior: Behavior normal        VIRTUAL VISIT DISCLAIMER    Ferny Lobo acknowledges that she has consented to an online visit or consultation  She understands that the online visit is based solely on information provided by her, and that, in the absence of a face-to-face physical evaluation by the physician, the diagnosis she receives is both limited and provisional in terms of accuracy and completeness  This is not intended to replace a full medical face-to-face evaluation by the physician  Mukesh Juan understands and accepts these terms

## 2021-04-08 DIAGNOSIS — F33.41 MAJOR DEPRESSIVE DISORDER, RECURRENT, IN PARTIAL REMISSION (HCC): ICD-10-CM

## 2021-04-08 DIAGNOSIS — F33.1 MODERATE EPISODE OF RECURRENT MAJOR DEPRESSIVE DISORDER (HCC): ICD-10-CM

## 2021-04-08 DIAGNOSIS — M79.7 FIBROMYALGIA: ICD-10-CM

## 2021-04-08 DIAGNOSIS — F41.1 GENERALIZED ANXIETY DISORDER: ICD-10-CM

## 2021-04-09 RX ORDER — DULOXETIN HYDROCHLORIDE 60 MG/1
60 CAPSULE, DELAYED RELEASE ORAL 2 TIMES DAILY
Qty: 60 CAPSULE | Refills: 0 | Status: SHIPPED | OUTPATIENT
Start: 2021-04-09 | End: 2021-07-20 | Stop reason: HOSPADM

## 2021-04-09 RX ORDER — QUETIAPINE FUMARATE 100 MG/1
100 TABLET, FILM COATED ORAL
Qty: 30 TABLET | Refills: 0 | Status: SHIPPED | OUTPATIENT
Start: 2021-04-09 | End: 2021-05-24 | Stop reason: SDUPTHER

## 2021-04-13 ENCOUNTER — TELEPHONE (OUTPATIENT)
Dept: FAMILY MEDICINE CLINIC | Facility: CLINIC | Age: 66
End: 2021-04-13

## 2021-04-13 NOTE — TELEPHONE ENCOUNTER
Received fax for pt needing clearance  We will need to call her to tavo an appointment  I am putting the forms in Dr Tello folder for now   [place], [place]

## 2021-04-13 NOTE — TELEPHONE ENCOUNTER
Dr Colby Garrison did not have anything available    appt made with Dr Meggan Orellana on 4/29 at 11:30  Form in folder  Elena Manuel, [address] [place] [address]

## 2021-04-20 ENCOUNTER — TELEPHONE (OUTPATIENT)
Dept: FAMILY MEDICINE CLINIC | Facility: CLINIC | Age: 66
End: 2021-04-20

## 2021-04-20 ENCOUNTER — TRANSCRIBE ORDERS (OUTPATIENT)
Dept: PHYSICAL THERAPY | Facility: CLINIC | Age: 66
End: 2021-04-20

## 2021-04-20 ENCOUNTER — EVALUATION (OUTPATIENT)
Dept: PHYSICAL THERAPY | Facility: CLINIC | Age: 66
End: 2021-04-20
Payer: COMMERCIAL

## 2021-04-20 DIAGNOSIS — M54.12 CERVICAL RADICULOPATHY: Primary | ICD-10-CM

## 2021-04-20 PROCEDURE — 97162 PT EVAL MOD COMPLEX 30 MIN: CPT

## 2021-04-20 NOTE — PROGRESS NOTES
PT Evaluation     Today's date: 2021  Patient name: Kierra Chao  : 1955  MRN: [id number]  Referring provider: Anders Fair  Dx:   Encounter Diagnosis     ICD-10-CM    1  Cervical radiculopathy  M54 12                   Assessment  Assessment details: Kierra Chao is a 60 y o  female with past history of anterior cervical fusion C3-5 who presents to Physical Therapy at [place] [address] with signs and symptoms consistent with referring diagnosis of cervical radiculopathy  Patient is scheduled for anterior cervical discectomy and fusion on 21 and was referred to improve cervical ROM  Patient presents with the following impairments: posterior neck and myofascial pain with radicular symptoms down to both hands, decreased strength, decreased ROM, decreased joint mobility and postural dysfunction  Due to these impairments, patient has difficulty performing the following: ADL's, recreational activities, lifting/carrying, reaching overhead, self-care activities, prolonged sitting, bending forward, household chores, yard work, looking up, turning head left or right, and falling/staying asleep  Patient has been educated in home exercise program and plan of care  Patient would benefit from skilled physical therapy services to address the above functional limitations and progress towards prior level of function and independence with home exercise program   Impairments: abnormal muscle firing, abnormal or restricted ROM, activity intolerance, impaired physical strength, lacks appropriate home exercise program, pain with function, scapular dyskinesis, poor posture  and poor body mechanics  Understanding of Dx/Px/POC: good   Prognosis: good    Goals  Short Term Goals (3 weeks; target date: 21)  1  Patient will be independent with initial HEP  2  Patient will demonstrate an increase in cervical AROM by 5° in all planes  3  Patient will be able to self-correct posture in the clinic 50% of the time or greater      Long Term Goals (6 weeks; target date: 7/1/21)  1  Patient will demonstrate an increase in cervical AROM to WNL in order to promote self-care activities pain-free  2  Patient will demonstrate an increase in B/L shoulder strength to 4/5 grade on MMT in order to promote improved carrying/lifting  3  Patient will be able to self-correct posture in the clinic 75% of the time or greater  4  Patient will be independent with comprehensive HEP  Plan  Plan details: Patient has been educated on the facility's COVID precautions and procedures  Patient has also been educated on the facility's cancellation policy and has verbalized agreement with this  Patient would benefit from: skilled physical therapy  Planned modality interventions: cryotherapy, electrical stimulation/Russian stimulation, TENS, ultrasound, thermotherapy: hydrocollator packs, traction, high voltage pulsed current: spasm management and high voltage pulsed current: pain management  Planned therapy interventions: flexibility, functional ROM exercises, graded exercise, home exercise program, joint mobilization, manual therapy, neuromuscular re-education, patient education, strengthening, stretching, therapeutic exercise, therapeutic activities, balance/weight bearing training, gait training, abdominal trunk stabilization, self care, postural training, activity modification, ADL retraining, ADL training, balance, behavior modification, body mechanics training, breathing training, therapeutic training, transfer training, graded activity, graded motor, muscle pump exercises, Nguyen taping and IADL retraining  Frequency: 2x week  Duration in weeks: 6  Plan of Care beginning date: 4/20/2021  Plan of Care expiration date: 7/20/2021  Treatment plan discussed with: patient        Subjective Evaluation    History of Present Illness  Mechanism of injury: Pt reports chronic neck pain for several years  Pt states that this became worse around February 2021   Pt reports numbness in both hands, left more often than right  Pt states that she also has "electric-shock" sensations throughout the entire hand  Pt reports difficulty with looking up, lifting/carrying are difficult  Pt states that she has hx of anterior cervical fusion C3-4 and C4-5  Pt states that she is scheduled for surgery May 5, 2021 due to severe cervical stenosis (ACDF procedure, as per pt)  Pain  Current pain ratin  At best pain ratin  At worst pain ratin  Location: Cervical   Quality: radiating and dull ache  Aggravating factors: lifting  Progression: worsening    Social Support  Lives with: spouse    Employment status: not working (Permanent disability)  Hand dominance: right  Exercise history: 2-3x a week, walking, gardening      Diagnostic Tests  X-ray: abnormal  MRI studies: abnormal  Treatments  Previous treatment: physical therapy  Patient Goals  Patient goals for therapy: increased motion and independence with ADLs/IADLs          Objective     Postural Observations  Seated posture: poor    Additional Postural Observation Details  Forward head, rounded shoulder posture    Palpation   Left   Hypertonic in the cervical paraspinals, levator scapulae, scalenes, sternocleidomastoid and upper trapezius  Hypotonic in the lower trapezius  Tenderness of the cervical paraspinals, middle trapezius, suboccipitals and upper trapezius  Right   Hypertonic in the cervical paraspinals, levator scapulae, scalenes, sternocleidomastoid and upper trapezius  Hypotonic in the lower trapezius  Tenderness of the cervical paraspinals, middle trapezius, suboccipitals and upper trapezius       Additional Palpation Details  Pt presents with increased tissue tension and TTP of right-sided cervical musculature compared to left    Neurological Testing     Sensation   Cervical/Thoracic   Left   Hyposensation: light touch    Right   Intact: light touch    Comments   Left light touch: Decreased sensation throughout left hand, C5-6       Active Range of Motion   Cervical/Thoracic Spine       Cervical    Flexion: 15 degrees  with pain  Extension: 20 degrees     with pain  Left lateral flexion: 15 degrees      Right lateral flexion: 20 degrees      Left rotation: 40 (Sharp right-sided muscle pain) degrees with pain  Right rotation: 20 degrees    with pain    Strength/Myotome Testing   Cervical Spine   Neck flexion: 3+    Left   Interossei strength (t1): 4  Neck lateral flexion (C3): 3+  Levator scapulae (C4): 4    Right   Interossei strength (t1): 4  Neck lateral flexion (C3): 3+  Levator scapulae (C4): 4    Left Shoulder     Planes of Motion   Abduction: 4     Isolated Muscles   Levator scapulae: 4     Right Shoulder     Planes of Motion   Abduction: 4     Isolated Muscles   Levator scapulae: 4     Left Elbow   Flexion: 4-  Extension: 3+    Right Elbow   Flexion: 3+  Extension: 3+    Left Wrist/Hand   Wrist flexion: 4-  Thumb extension: 4    Right Wrist/Hand   Wrist flexion: 3+  Thumb extension: 4             Flowsheet Rows      Most Recent Value   PT/OT G-Codes   Current Score  39   Projected Score  51             Precautions: Chronic pain, fibromyalgia, depression, anxiety, hx of anterior cervical fusion C3-5, GERD, hx of lumbar radiculopathy       EVAL 2 3 4 5   Manuals 4/20/21       STM/MFR        Manual distraction, SOR        Joint mobs         Neuro Re-Ed        Chin tucks Instructed seated       Scap retractions        Rows        Shoulder extensions                                Ther Ex        UT/LS stretch Instructed seated       Cervical AROM        Seated thoracic extension        Open book                                                Ther Activity        Pt education POC, HEP       Postural education Performed                                               Modalities

## 2021-04-20 NOTE — TELEPHONE ENCOUNTER
Received another pre op form (see 4/13 telephone note where Reaz Wilkins documented also receiving forms  This fax includes labs, (which are already in chart under labs) CXR as well which I put in Dr Foreman's folder   (EKG in Media section) Appt on 4/29/21 [facility] L[facility]

## 2021-04-20 NOTE — LETTER
2021    [facility] [address] [place] [address]    Patient: Beean Land   YOB: 1955   Date of Visit: 2021     Encounter Diagnosis     ICD-10-CM    1  Cervical radiculopathy  M54 12        Dear Dr Palmer Finn: Thank you for your recent referral of Beena Land  Please review the attached evaluation summary from Ina's recent visit  Please verify that you agree with the plan of care by signing the attached order  If you have any questions or concerns, please do not hesitate to call  I sincerely appreciate the opportunity to share in the care of one of your patients and hope to have another opportunity to work with you in the near future  Sincerely,    Janice Tellez  [facility]      Referring Provider:      I certify that I have read the below Plan of Care and certify the need for these services furnished under this plan of treatment while under my care  [facility] [address] [place] [address]  Via Fax: [phone]          PT Evaluation     Today's date: 2021  Patient name: Beena Land  : 1955  MRN: [id number]  Referring provider: Shahram Nicole  Dx:   Encounter Diagnosis     ICD-10-CM    1  Cervical radiculopathy  M54 12                   Assessment  Assessment details: Beena Land is a 60 y o  female with past history of anterior cervical fusion C3-5 who presents to Physical Therapy at [place] [address] with signs and symptoms consistent with referring diagnosis of cervical radiculopathy  Patient is scheduled for anterior cervical discectomy and fusion on 21 and was referred to improve cervical ROM  Patient presents with the following impairments: posterior neck and myofascial pain with radicular symptoms down to both hands, decreased strength, decreased ROM, decreased joint mobility and postural dysfunction   Due to these impairments, patient has difficulty performing the following: ADL's, recreational activities, lifting/carrying, reaching overhead, self-care activities, prolonged sitting, bending forward, household chores, yard work, looking up, turning head left or right, and falling/staying asleep  Patient has been educated in home exercise program and plan of care  Patient would benefit from skilled physical therapy services to address the above functional limitations and progress towards prior level of function and independence with home exercise program   Impairments: abnormal muscle firing, abnormal or restricted ROM, activity intolerance, impaired physical strength, lacks appropriate home exercise program, pain with function, scapular dyskinesis, poor posture  and poor body mechanics  Understanding of Dx/Px/POC: good   Prognosis: good    Goals  Short Term Goals (3 weeks; target date: 6/1/21)  1  Patient will be independent with initial HEP  2  Patient will demonstrate an increase in cervical AROM by 5° in all planes  3  Patient will be able to self-correct posture in the clinic 50% of the time or greater  Long Term Goals (6 weeks; target date: 7/1/21)  1  Patient will demonstrate an increase in cervical AROM to WNL in order to promote self-care activities pain-free  2  Patient will demonstrate an increase in B/L shoulder strength to 4/5 grade on MMT in order to promote improved carrying/lifting  3  Patient will be able to self-correct posture in the clinic 75% of the time or greater  4  Patient will be independent with comprehensive HEP  Plan  Plan details: Patient has been educated on the facility's COVID precautions and procedures  Patient has also been educated on the facility's cancellation policy and has verbalized agreement with this     Patient would benefit from: skilled physical therapy  Planned modality interventions: cryotherapy, electrical stimulation/Russian stimulation, TENS, ultrasound, thermotherapy: hydrocollator packs, traction, high voltage pulsed current: spasm management and high voltage pulsed current: pain management  Planned therapy interventions: flexibility, functional ROM exercises, graded exercise, home exercise program, joint mobilization, manual therapy, neuromuscular re-education, patient education, strengthening, stretching, therapeutic exercise, therapeutic activities, balance/weight bearing training, gait training, abdominal trunk stabilization, self care, postural training, activity modification, ADL retraining, ADL training, balance, behavior modification, body mechanics training, breathing training, therapeutic training, transfer training, graded activity, graded motor, muscle pump exercises, Nguyen taping and IADL retraining  Frequency: 2x week  Duration in weeks: 6  Plan of Care beginning date: 2021  Plan of Care expiration date: 2021  Treatment plan discussed with: patient        Subjective Evaluation    History of Present Illness  Mechanism of injury: Pt reports chronic neck pain for several years  Pt states that this became worse around 2021  Pt reports numbness in both hands, left more often than right  Pt states that she also has "electric-shock" sensations throughout the entire hand  Pt reports difficulty with looking up, lifting/carrying are difficult  Pt states that she has hx of anterior cervical fusion C3-4 and C4-5  Pt states that she is scheduled for surgery May 5, 2021 due to severe cervical stenosis (ACDF procedure, as per pt)     Pain  Current pain ratin  At best pain ratin  At worst pain ratin  Location: Cervical   Quality: radiating and dull ache  Aggravating factors: lifting  Progression: worsening    Social Support  Lives with: spouse    Employment status: not working (Permanent disability)  Hand dominance: right  Exercise history: 2-3x a week, walking, gardening      Diagnostic Tests  X-ray: abnormal  MRI studies: abnormal  Treatments  Previous treatment: physical therapy  Patient Goals  Patient goals for therapy: increased motion and independence with ADLs/IADLs          Objective     Postural Observations  Seated posture: poor    Additional Postural Observation Details  Forward head, rounded shoulder posture    Palpation   Left   Hypertonic in the cervical paraspinals, levator scapulae, scalenes, sternocleidomastoid and upper trapezius  Hypotonic in the lower trapezius  Tenderness of the cervical paraspinals, middle trapezius, suboccipitals and upper trapezius  Right   Hypertonic in the cervical paraspinals, levator scapulae, scalenes, sternocleidomastoid and upper trapezius  Hypotonic in the lower trapezius  Tenderness of the cervical paraspinals, middle trapezius, suboccipitals and upper trapezius  Additional Palpation Details  Pt presents with increased tissue tension and TTP of right-sided cervical musculature compared to left    Neurological Testing     Sensation   Cervical/Thoracic   Left   Hyposensation: light touch    Right   Intact: light touch    Comments   Left light touch: Decreased sensation throughout left hand, C5-6       Active Range of Motion   Cervical/Thoracic Spine       Cervical    Flexion: 15 degrees  with pain  Extension: 20 degrees     with pain  Left lateral flexion: 15 degrees      Right lateral flexion: 20 degrees      Left rotation: 40 (Sharp right-sided muscle pain) degrees with pain  Right rotation: 20 degrees    with pain    Strength/Myotome Testing   Cervical Spine   Neck flexion: 3+    Left   Interossei strength (t1): 4  Neck lateral flexion (C3): 3+  Levator scapulae (C4): 4    Right   Interossei strength (t1): 4  Neck lateral flexion (C3): 3+  Levator scapulae (C4): 4    Left Shoulder     Planes of Motion   Abduction: 4     Isolated Muscles   Levator scapulae: 4     Right Shoulder     Planes of Motion   Abduction: 4     Isolated Muscles   Levator scapulae: 4     Left Elbow   Flexion: 4-  Extension: 3+    Right Elbow   Flexion: 3+  Extension: 3+    Left Wrist/Hand   Wrist flexion: 4-  Thumb extension: 4    Right Wrist/Hand   Wrist flexion: 3+  Thumb extension: 4             Flowsheet Rows      Most Recent Value   PT/OT G-Codes   Current Score  39   Projected Score  51             Precautions: Chronic pain, fibromyalgia, depression, anxiety, hx of anterior cervical fusion C3-5, GERD, hx of lumbar radiculopathy       EVAL 2 3 4 5   Manuals 4/20/21       STM/MFR        Manual distraction, SOR        Joint mobs         Neuro Re-Ed        Chin tucks Instructed seated       Scap retractions        Rows        Shoulder extensions                                Ther Ex        UT/LS stretch Instructed seated       Cervical AROM        Seated thoracic extension        Open book                                                Ther Activity        Pt education POC, HEP       Postural education Performed                                               Modalities

## 2021-04-21 ENCOUNTER — OFFICE VISIT (OUTPATIENT)
Dept: PHYSICAL THERAPY | Facility: CLINIC | Age: 66
End: 2021-04-21
Payer: COMMERCIAL

## 2021-04-21 DIAGNOSIS — M54.12 CERVICAL RADICULOPATHY: Primary | ICD-10-CM

## 2021-04-21 PROCEDURE — 97110 THERAPEUTIC EXERCISES: CPT

## 2021-04-21 PROCEDURE — 97112 NEUROMUSCULAR REEDUCATION: CPT

## 2021-04-21 PROCEDURE — 97140 MANUAL THERAPY 1/> REGIONS: CPT

## 2021-04-21 NOTE — PROGRESS NOTES
Daily Note     Today's date: 2021  Patient name: Maya Roth  : 1955  MRN: [id number]  Referring provider: Mary Car  Dx:   Encounter Diagnosis     ICD-10-CM    1  Cervical radiculopathy  M54 12        Start Time: 1237  Stop Time: 1615  Total time in clinic (min): 45 minutes    Subjective: Pt reports compliance with HEP and reports relief since last session  Objective: See treatment diary below      Assessment: Tolerated treatment well  Patient exhibited good technique with therapeutic exercises and would benefit from continued PT  Pt tolerated session well overall, she demo difficulty with CS retractions  Trial in supine next session  Plan: Continue per plan of care        Precautions: Chronic pain, fibromyalgia, depression, anxiety, hx of anterior cervical fusion C3-5, GERD, hx of lumbar radiculopathy       EVAL 2 3 4 5   Manuals 21      STM/MFR  Utrap 5'      Manual distraction, SOR  3'      Joint mobs         Neuro Re-Ed        Chin tucks Instructed seated 3"20x      Scap retractions  5"20x      Rows        Shoulder extensions                                Ther Ex        UT/LS stretch Instructed seated 10sx5      Cervical AROM  20x      Seated thoracic extension  Half foam 20x      Open book                                                Ther Activity        Pt education POC, HEP       Postural education Performed                                               Modalities        HP  5' pre seated

## 2021-04-22 DIAGNOSIS — E03.8 OTHER SPECIFIED HYPOTHYROIDISM: ICD-10-CM

## 2021-04-22 RX ORDER — LEVOTHYROXINE SODIUM 0.05 MG/1
TABLET ORAL
Qty: 90 TABLET | Refills: 0 | Status: SHIPPED | OUTPATIENT
Start: 2021-04-22 | End: 2021-08-23

## 2021-04-25 NOTE — PSYCH
PROGRESS NOTE        [place]      Name and Date of Birth:  Barbi Schwarz 60 y o  1955    Date of Visit: 05/16/21    Pt was spoken to today by phone for medication management and psychotherapy for treatment of Major Depressive Disorder, recurrent, in partial remission; Generalized Anxiety Disorder, and Gambling Disorder, and Primary Insomnia; pt was unable to connect via Teams  Door to office was closed; provider was alone in office, pt aware and consented to phone session  Time spent on psychotherapy: 17 minutes    Treatment modality: medication management; medication education; reinforce/encourage adaptive behavior      SUBJECTIVE: doing well  Her moods have been much more stable, no high-highs or low-lows  Some things upset her more than she thinks they probably should, like disagreements with her   She is really troubled when there is discord  She saw psychotherapist Luis Alfredo Casas [pt forgets his last name] from [address] [facility], had 12 sessions with him virtually and found them very helpful in achieving and continuing remission from gambling addiction  Also attends [employer] meetings online and passed her 80 days recently, also sometimes attends AA on Fridays  Did not get last refill of naltrexone, thinks she did need it in the beginning, no longer though, didn't notice a difference in desire to [place]  Was scheduled to have first COVID vaccine but her  talked her out of it  She is waiting until she has recuperated from her back surgery scheduled for next week; sometimes she feels a little bullied by her , she has to remember that she has a mind and can make decisions for herself  Did not get last refill of naltrexone, thinks she did need it in the beginning, didn't notice a difference in desire to [place]   Her near vision is improved but her distance vision is not that good so last week she ordered glasses for distance vision  2/15/2021: doing very well, going to [facility], sees a psychotherapist, has a sponsor and attending AA sober for 25 years  Wonders if she needs to take naltrexone but will continue to  Her vision has improved significantly, she drives but only on local roads  Thinking of starting a recovery house for women in [facility] with some other people  Not suicidal, doesn't know why she took extra meds  Having vivid dreams  Feels that she is in the right place right now  Is going to start physical therapy for pain  She is taking quetiapine 100 mg at HS  Continue naltrexone 50 mg one tab po qd; duloxetine 60 mg cap po BID; quetiapine 100 mg po qHS; trazodone 100 mg po qHS    12/29/2020: Pt has continued to take trazodone 100 mg po qHS and quetiapine 100 mg po qHS (decreased from 125 mg) is sleeping well at with decreased doses, and has no brain fog  She had left eye surgery at [facility] in [place] on 12/23 and 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, she had a visit yesterday with an ophthalmologist and was told she is experiencing normal post-op symptoms and that it will take four to six weeks to know if the operation was successful  She cannot drive for six weeks, and is home alone during the daytime when her  is at work  She hadn't realized that this could happen; her right eye vision is poor and pt is worried about her vision returning and has resulting depression  Wanted to go down to the river and throw herself in but it was frozen, it was a fleeting thought without intent, she had it because she was gambling which she does when she is depressed  Pt is trying to stay away from her phone  When she goes on her cell phone she gambles and she is afraid of the temptation of the phone  She has had to take out a loan to cover her losses  Her  does not know about the loan or the extent of her losses   The $1000 for life that she won playing a [employer] is not enough to offset the money that she has lost   She is trying to be mindful and thoughtful and stop gambling but it isn't working  She wanted to begin to see a psychotherapist, but available appointments were at late in the day and due to her job she could not attend them, but she would like to see a psychotherapist now and since she cannot work for 6 weeks can schedule them during the daytime  Pt agrees to being contacted by [facility]  to schedule psychotherapy for addiction and depression/anxiety  She denies suicidal ideation, intent or plan at present, has no suicidal ideation, intent or plan at present  She denies any auditory hallucinations and visual hallucinations, denies any other delusional thinking, denies any delusional thinking  She denies any side effects from medications    HPI ROS Appetite Changes and Sleep: normal appetite, normal sleep    Review Of Systems:      Constitutional Negative   ENT Negative   Cardiovascular Negative   Respiratory Negative   Gastrointestinal Negative   Genitourinary Negative   Musculoskeletal Negative   Integumentary Negative   Neurological Negative   Endocrine Negative   Other Symptoms Negative and None       Laboratory Results: No results found for this or any previous visit      Substance Abuse History:    Social History     Substance and Sexual Activity   Drug Use Never       Family Psychiatric History:     Family History   Problem Relation Age of Onset    Heart disease Mother     Stroke Mother 58    COPD Mother     Arthritis Mother     Hypertension Father     Kidney disease Brother         kidney transplant    Multiple sclerosis Sister     No Known Problems Maternal Aunt     No Known Problems Maternal Uncle     No Known Problems Paternal Aunt     No Known Problems Paternal Uncle     No Known Problems Maternal Grandmother     No Known Problems Maternal Grandfather     No Known Problems Paternal Grandmother     No Known Problems Paternal Jairo Villarreal ADD / ADHD Neg Hx     Anesthesia problems Neg Hx     Cancer Neg Hx     Clotting disorder Neg Hx     Collagen disease Neg Hx     Diabetes Neg Hx     Dislocations Neg Hx     Learning disabilities Neg Hx     Neurological problems Neg Hx     Osteoporosis Neg Hx     Rheumatologic disease Neg Hx     Scoliosis Neg Hx     Vascular Disease Neg Hx        The following portions of the patient's history were reviewed and updated as appropriate: past family history, past medical history, past social history, past surgical history and problem list     Social History     Socioeconomic History    Marital status: /Civil Union     Spouse name: Not on file    Number of children: Not on file    Years of education: Not on file    Highest education level: Not on file   Occupational History    Not on file   Social Needs    Financial resource strain: Not on file    Food insecurity     Worry: Not on file     Inability: Not on file   [employer] needs     Medical: Not on file     Non-medical: Not on file   Tobacco Use    Smoking status: Never Smoker    Smokeless tobacco: Never Used   Substance and Sexual Activity    Alcohol use: Not Currently     Frequency: Never     Binge frequency: Never    Drug use: Never    Sexual activity: Not Currently   Lifestyle    Physical activity     Days per week: Not on file     Minutes per session: Not on file    Stress: Not on file   Relationships    Social connections     Talks on phone: Not on file     Gets together: Not on file     Attends [employer] service: Not on file     Active member of club or organization: Not on file     Attends meetings of clubs or organizations: Not on file     Relationship status: Not on file    Intimate partner violence     Fear of current or ex partner: Not on file     Emotionally abused: Not on file     Physically abused: Not on file     Forced sexual activity: Not on file   Other Topics Concern    Not on file   Social History Narrative    Not on file     Social History     Social History Narrative    Not on file        Social History     Tobacco History     Smoking Status  Never Smoker    Smokeless Tobacco Use  Never Used          Alcohol History     Alcohol Use Status  Not Currently          Drug Use     Drug Use Status  Never          Sexual Activity     Sexually Active  Not Currently          Activities of Daily Living    Not Asked                     OBJECTIVE:     Mental Status Evaluation:  Phone appointment, not assessed    Appearance Phone appointment, not assessed   Behavior pleasant, cooperative   Speech normal volume, normal pitch   Mood euthymic   Affect Phone appointment, not assessed   Thought Processes Coherent, organized, goal-directed   Associations intact associations   Thought Content normal   Perceptual Disturbances: none   Abnormal Thoughts  Risk Potential Suicidal ideation - None  Homicidal ideation - None  Potential for aggression - No   Orientation oriented to person, place, time/date and situation   Memory recent and remote memory grossly intact   Cosciousness alert and awake   Attention Span attention span and concentration are age appropriate   Intellect Not formally assessed   Insight fair   Judgement fair   Muscle Strength and  Gait Phone appointment, not assessed   Language no difficulty naming common objects   Fund of Knowledge displays adequate knowledge of current events   Pain none   Pain Scale 0       Assessment/Plan:       Diagnoses and all orders for this visit:    Major depressive disorder, recurrent, in partial remission (HCC)    Generalized anxiety disorder    Gambling disorder, moderate    Primary insomnia          Treatment Recommendations/Precautions:    Stop naltrexone 50 mg tab (pt has not been taking it)  Continue current medications:  duloxetine 60 mg cap po BID; quetiapine 100 mg po qHS; trazodone 100 mg po qHS    Risks/Benefits      Risks, Benefits And Possible Side Effects Of Medications:    Risks, benefits, and possible side effects of medications explained to patient and patient verbalizes understanding and agreement for treatment      Controlled Medication Discussion:     Not applicable

## 2021-04-26 ENCOUNTER — TELEMEDICINE (OUTPATIENT)
Dept: PSYCHIATRY | Facility: CLINIC | Age: 66
End: 2021-04-26
Payer: COMMERCIAL

## 2021-04-26 DIAGNOSIS — F63.0 GAMBLING DISORDER, MODERATE: ICD-10-CM

## 2021-04-26 DIAGNOSIS — F51.01 PRIMARY INSOMNIA: ICD-10-CM

## 2021-04-26 DIAGNOSIS — F33.41 MAJOR DEPRESSIVE DISORDER, RECURRENT, IN PARTIAL REMISSION (HCC): Primary | ICD-10-CM

## 2021-04-26 DIAGNOSIS — F41.1 GENERALIZED ANXIETY DISORDER: ICD-10-CM

## 2021-04-26 PROCEDURE — 90833 PSYTX W PT W E/M 30 MIN: CPT | Performed by: NURSE PRACTITIONER

## 2021-04-26 PROCEDURE — 99214 OFFICE O/P EST MOD 30 MIN: CPT | Performed by: NURSE PRACTITIONER

## 2021-04-27 ENCOUNTER — OFFICE VISIT (OUTPATIENT)
Dept: PHYSICAL THERAPY | Facility: CLINIC | Age: 66
End: 2021-04-27
Payer: COMMERCIAL

## 2021-04-27 DIAGNOSIS — M54.12 CERVICAL RADICULOPATHY: Primary | ICD-10-CM

## 2021-04-27 PROCEDURE — 97140 MANUAL THERAPY 1/> REGIONS: CPT

## 2021-04-27 PROCEDURE — 97110 THERAPEUTIC EXERCISES: CPT

## 2021-04-27 PROCEDURE — 97112 NEUROMUSCULAR REEDUCATION: CPT

## 2021-04-27 NOTE — PROGRESS NOTES
Daily Note      Today's date: 2021  Patient name: Yumiko Barros  : 1955  MRN: [id number]  Referring provider: Kayli Quintanilla  Dx:   Encounter Diagnosis     ICD-10-CM    1  Cervical radiculopathy  M54 12        Subjective: Pt reports that exercises have been helping so far  Pt states that she is able to turn to the right more easily  Pt states that turning the left is still painful on the left side of the neck  Objective: See treatment diary below    Assessment: Pt tolerated treatment well  Pt presented with several areas of localized tissue tension toward the right in the lower cervical region and to the left in the upper cervical and suboccipitals  Pt was educated in utilized a tennis ball to perform self-release while lying supine  Pt demonstrated the ability to perform self-release and noted that this technique is helpful  Pt also educated in performing AROM with manual pressure over tender points in an effort to decrease tissue tension  Pt demonstrates good carryover of exercises instructed to date and was reminded of scap retraction exercise  Educated pt that pre-op program will differ from post-operative program, depending on surgeon's instructions  Pt demonstrated good understanding  Pt would benefit from printed comprehensive program next visit  Plan: Continue per plan of care  Progress treatment as tolerated  Precautions: Chronic pain, fibromyalgia, depression, anxiety, hx of anterior cervical fusion C3-5, GERD, hx of lumbar radiculopathy       EVAL 2 3 4 5   Manuals 21     STM/MFR  Utrap 5' UT, SCM, LS B/L     Manual distraction, SOR  3' Performed     Joint mobs         Neuro Re-Ed        Chin tucks Instructed seated 3"20x 20x3s     Scap retractions  5"20x 20x5s      Rows        Shoulder extensions   20x5s AROM standing                             Ther Ex        UT/LS stretch Instructed seated 10sx5 10x5s     Cervical AROM  20x 10x5s ea   Flex/ext, L/R rotation     Seated thoracic extension  Half foam 20x 10x5s 1/2 FR     Open book                                                Ther Activity        Pt education POC, HEP  Self-release with tennis ball, differentiating pre-op and post-op programs Comprehensive pre-op HEP    Postural education Performed                                               Modalities        HP  5' pre seated 5' pre seated

## 2021-04-29 ENCOUNTER — OFFICE VISIT (OUTPATIENT)
Dept: PHYSICAL THERAPY | Facility: CLINIC | Age: 66
End: 2021-04-29
Payer: COMMERCIAL

## 2021-04-29 ENCOUNTER — OFFICE VISIT (OUTPATIENT)
Dept: FAMILY MEDICINE CLINIC | Facility: CLINIC | Age: 66
End: 2021-04-29
Payer: COMMERCIAL

## 2021-04-29 VITALS
WEIGHT: 164 LBS | RESPIRATION RATE: 16 BRPM | BODY MASS INDEX: 28 KG/M2 | HEIGHT: 64 IN | HEART RATE: 86 BPM | DIASTOLIC BLOOD PRESSURE: 80 MMHG | TEMPERATURE: 99.1 F | SYSTOLIC BLOOD PRESSURE: 136 MMHG

## 2021-04-29 DIAGNOSIS — M54.12 CERVICAL MYELOPATHY WITH CERVICAL RADICULOPATHY (HCC): ICD-10-CM

## 2021-04-29 DIAGNOSIS — Z01.818 PREOP EXAMINATION: ICD-10-CM

## 2021-04-29 DIAGNOSIS — M54.12 CERVICAL RADICULOPATHY: Primary | ICD-10-CM

## 2021-04-29 DIAGNOSIS — R10.813 RIGHT LOWER QUADRANT ABDOMINAL TENDERNESS, REBOUND TENDERNESS PRESENCE NOT SPECIFIED: ICD-10-CM

## 2021-04-29 DIAGNOSIS — G95.9 CERVICAL MYELOPATHY WITH CERVICAL RADICULOPATHY (HCC): ICD-10-CM

## 2021-04-29 DIAGNOSIS — J45.20 MILD INTERMITTENT ASTHMA WITHOUT COMPLICATION: Primary | ICD-10-CM

## 2021-04-29 PROCEDURE — 3008F BODY MASS INDEX DOCD: CPT | Performed by: FAMILY MEDICINE

## 2021-04-29 PROCEDURE — 97140 MANUAL THERAPY 1/> REGIONS: CPT

## 2021-04-29 PROCEDURE — 97110 THERAPEUTIC EXERCISES: CPT

## 2021-04-29 PROCEDURE — 3725F SCREEN DEPRESSION PERFORMED: CPT | Performed by: FAMILY MEDICINE

## 2021-04-29 PROCEDURE — 1036F TOBACCO NON-USER: CPT | Performed by: FAMILY MEDICINE

## 2021-04-29 PROCEDURE — 97112 NEUROMUSCULAR REEDUCATION: CPT

## 2021-04-29 PROCEDURE — 99214 OFFICE O/P EST MOD 30 MIN: CPT | Performed by: FAMILY MEDICINE

## 2021-04-29 NOTE — PROGRESS NOTES
Daily Note      Today's date: 2021  Patient name: Alfonzo Morales  : 1955  MRN: [id number]  Referring provider: Sulema Gonzalez  Dx:   Encounter Diagnosis     ICD-10-CM    1  Cervical radiculopathy  M54 12      (21) Pt underwent anterior cervical fusion procedure in May 2021 and pt will be discharged from PT  Subjective: Pt reports that the left side of the neck continues to bother her while turning left  Pt states that her left leg bothers her more than her neck because she went hiking yesterday for a total of 6 miles  Pt also states that she received COVID vaccine yesterday and attributes overall muscle aches to getting the shot  Objective: See treatment diary below    Assessment: Pt tolerated treatment well  Pt demonstrates excellent carryover of HEP to date  Pt educated in comprehensive HEP and reviewed that this program is specifically for pre-operative exercises  Advised pt to adhere to MD's instructions post-operatively  Pt is progressing well overall and is now more equipped with self-management strategies for increased muscle tension or overall neck stiffness  Plan: Continue per plan of care  Precautions: Chronic pain, fibromyalgia, depression, anxiety, hx of anterior cervical fusion C3-5, GERD, hx of lumbar radiculopathy       EVAL 2 3 4 5   Manuals 21    STM/MFR  Utrap 5' UT, SCM, LS B/L UT, SCM, LS    Manual distraction, SOR  3' Performed     Joint mobs         Neuro Re-Ed        Chin tucks Instructed seated 3"20x 20x3s 20x3s    Scap retractions  5"20x 20x5s  20x3s    Rows    10x3s YTB    Shoulder extensions   20x5s AROM standing 10x3s YTB                            Ther Ex        UT/LS stretch Instructed seated 10sx5 10x5s 3x10s ea  B/L    Cervical AROM  20x 10x5s ea   Flex/ext, L/R rotation Reviewed    Seated thoracic extension  Half foam 20x 10x5s 1/2 FR Reviewed    Open book                                                Ther Activity Pt education POC, HEP  Self-release with tennis ball, differentiating pre-op and post-op programs Comprehensive pre-op HEP    Postural education Performed                                               Modalities        HP  5' pre seated 5' pre seated 5' pre seated

## 2021-04-29 NOTE — PROGRESS NOTES
FAMILY PRACTICE PRE-OPERATIVE EVALUATION  [facility]    NAME: Vance Nayak  AGE: 60 y o  SEX: female  : 1955     DATE: 2021        Surgeon:  [place] Thomas MD  Planned procedure:  Exploration of fusion, explanation of instrumentation at C5-6, anterior cervical discectomy fusion  Diagnosis for procedure:  Neck pain  Procedure date: 2021    Assessment/Plan:    Patient is medically optimized (cleared) for the planned procedure  Further testing/evaluation is not required      Postop concerns: no    Problem List Items Addressed This Visit        Respiratory    Mild asthma - Primary       Nervous and Auditory    Cervical myelopathy with cervical radiculopathy ([facility] [address] )      Other Visit Diagnoses     Preop examination        Right lower quadrant abdominal tenderness, rebound tenderness presence not specified                Pre-Surgery Instructions:   Medication Instructions    albuterol (PROVENTIL HFA,VENTOLIN HFA) 90 mcg/act inhaler Take morning of surgery    atorvastatin (LIPITOR) 20 mg tablet per anesthesia guidelines     baclofen 10 mg tablet per anesthesia guidelines     calcium carbonate-vitamin D (OSCAL-D) 500 mg-200 units per tablet per anesthesia guidelines     diphenhydrAMINE (BENADRYL) 50 mg capsule per anesthesia guidelines     docusate sodium (COLACE) 100 mg capsule per anesthesia guidelines     DULoxetine (CYMBALTA) 60 mg delayed release capsule per anesthesia guidelines     gabapentin (NEURONTIN) 300 mg capsule per anesthesia guidelines     ipratropium-albuterol (DUO-NEB) 0 5-2 5 mg/3 mL nebulizer solution per anesthesia guidelines     levothyroxine 50 mcg tablet per anesthesia guidelines     midodrine (PROAMATINE) 10 MG tablet per anesthesia guidelines     pantoprazole (PROTONIX) 40 mg tablet per anesthesia guidelines     polyethylene glycol (MIRALAX) 17 g packet per anesthesia guidelines     QUEtiapine (SEROquel) 100 mg tablet per anesthesia guidelines     traZODone (DESYREL) 100 mg tablet per anesthesia guidelines         Subjective:      Patient ID: Vance Nayak is a 60 y o  female  Chief Complaint   Patient presents with    Pre-op Exam     05/05/2021 Spinal surgery [facility] katherin,lpn       HPI    The following portions of the patient's history were reviewed and updated as appropriate: allergies, current medications, past family history, past medical history, past social history, past surgical history and problem list       Prior anesthesia: Yes   General; Complications:  None / Tolerated well    CAD History: arrhythmia on EKG  She went to see her cardiologist on 4/24/21 (Dr Blanca Cha)  She reports she had a nuclear stress test done which was normal  She was cleared by cardiology  Record currently unavailable  Pulmonary History: Asthma- controlled    Renal history: None    Diabetes History:  None     Neurological History: None     On Immunosuppressant meds/biologics: No      Review of Systems   Constitutional: Negative  HENT: Negative  Eyes: Negative  Respiratory: Negative  Cardiovascular: Negative  Gastrointestinal: Negative  Endocrine: Negative  Genitourinary: Negative  Musculoskeletal: Negative  Skin: Negative  Allergic/Immunologic: Negative  Neurological: Negative  Hematological: Negative  Psychiatric/Behavioral: Negative            Current Outpatient Medications   Medication Sig Dispense Refill    albuterol (PROVENTIL HFA,VENTOLIN HFA) 90 mcg/act inhaler Inhale 2 puffs every 4 (four) hours as needed for wheezing 1 Inhaler 0    atorvastatin (LIPITOR) 20 mg tablet Take 20 mg by mouth daily   0    baclofen 10 mg tablet take 1 tablet by mouth three times a day 30 tablet 0    calcium carbonate-vitamin D (OSCAL-D) 500 mg-200 units per tablet Take 1 tablet by mouth daily with breakfast 30 tablet 0    diphenhydrAMINE (BENADRYL) 50 mg capsule Take 50 mg by mouth every 6 (six) hours as needed for itching      docusate sodium (COLACE) 100 mg capsule Take 1 capsule (100 mg total) by mouth 2 (two) times a day (Patient taking differently: Take 100 mg by mouth daily ) 10 capsule 0    DULoxetine (CYMBALTA) 60 mg delayed release capsule Take 1 capsule (60 mg total) by mouth 2 (two) times a day 60 capsule 0    gabapentin (NEURONTIN) 300 mg capsule Take 2 capsules (600 mg total) by mouth 3 (three) times a day 90 capsule 3    ipratropium-albuterol (DUO-NEB) 0 5-2 5 mg/3 mL nebulizer solution Take 1 vial (3 mL total) by nebulization 4 (four) times a day 100 mL 0    levothyroxine 50 mcg tablet take 1 tablet by mouth once daily 90 tablet 0    meclizine (ANTIVERT) 25 mg tablet take 1 tablet by mouth every 6 hours if needed  IF DIZZINESS  0    midodrine (PROAMATINE) 10 MG tablet Take 1 tablet (10 mg total) by mouth 3 (three) times a day 90 tablet 2    pantoprazole (PROTONIX) 40 mg tablet Take 1 tablet (40 mg total) by mouth daily 60 tablet 2    polyethylene glycol (MIRALAX) 17 g packet Take 17 g by mouth 2 (two) times a day  0    QUEtiapine (SEROquel) 100 mg tablet Take 1 tablet (100 mg total) by mouth daily at bedtime 30 tablet 0    traZODone (DESYREL) 100 mg tablet Take 1 tablet (100 mg total) by mouth daily at bedtime 30 tablet 2     No current facility-administered medications for this visit          Allergies on file:   Demerol [meperidine] and Sulfa antibiotics    Patient Active Problem List   Diagnosis    Alcohol dependence in remission ([facility] [address] )    Allergic rhinitis    Arthropathy of multiple sites    Moderate episode of recurrent major depressive disorder (HCC)    Esophagitis    Irritable bowel syndrome    Raynaud's syndrome without gangrene    Osteopenia    Radiculopathy of lumbar region    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[address] )    Focal neurological deficit    Gastroesophageal reflux disease with esophagitis without hemorrhage    Cervical myelopathy with cervical radiculopathy (HCC)    History of recent intraspinal surgery    Orthostatic hypotension    History of migraine headaches    Mixed dyslipidemia    Lesion of lachelle    Right sided temporal headache    Fibromyalgia    ELLY (generalized anxiety disorder)    Herniated cervical disc    [facility] ([facility] )    Spinal stenosis    BMI 26 0-26 9,adult    Psychogenic weakness    History of cholecystectomy    Ambulatory dysfunction    Adult hypothyroidism    Dizziness and giddiness    Migraine without aura and without status migrainosus, not intractable    Syncope    History of vertigo    Epigastric abdominal pain    Abdominal pain    Leg weakness, bilateral    Depression    Gastroesophageal reflux disease    Mild asthma    Neuropathy        Past Medical History:   Diagnosis Date    Abdominal adhesions     Anesthesia complication     difficult to wake up    Anxiety     Depression     Depression     Disease of thyroid gland     Fibromyalgia     GERD (gastroesophageal reflux disease)     History of cholecystectomy 9/7/2019    Lazy eye     resolved: 3/27/17    Melanoma ([facility] ) 2010    Osteopenia     with joint pain-elevated DEV    Psychiatric disorder     Tinnitus     Wears glasses     for reading       Past Surgical History:   Procedure Laterality Date    ABDOMINAL SURGERY      lysis of adhesions x 2    APPENDECTOMY      CERVICAL FUSION      CHOLECYSTECTOMY      open    DILATION AND CURETTAGE OF UTERUS      NEUROMA EXCISION Right 5/26/2017    Procedure: EXCISION MASS / FIBROMA FOOT;  Surgeon: Elías Rosales DPM;  Location: [facility] OR;  Service:     PARS PLANA VITRECTOMY W/ REPAIR OF MACULAR HOLE  12/23/2020    RIGHT OOPHORECTOMY      WISDOM TOOTH EXTRACTION      x4       Family History   Problem Relation Age of Onset    Heart disease Mother     Stroke Mother 58    COPD Mother     Arthritis Mother     Hypertension Father     Kidney disease Brother         kidney transplant    Multiple sclerosis Sister     No Known Problems Maternal Aunt     No Known Problems Maternal Uncle     No Known Problems Paternal Aunt     No Known Problems Paternal Uncle     No Known Problems Maternal Grandmother     No Known Problems Maternal Grandfather     No Known Problems Paternal Grandmother     No Known Problems Paternal Grandfather     ADD / ADHD Neg Hx     Anesthesia problems Neg Hx     Cancer Neg Hx     Clotting disorder Neg Hx     Collagen disease Neg Hx     Diabetes Neg Hx     Dislocations Neg Hx     Learning disabilities Neg Hx     Neurological problems Neg Hx     Osteoporosis Neg Hx     Rheumatologic disease Neg Hx     Scoliosis Neg Hx     Vascular Disease Neg Hx        Social History     Tobacco Use    Smoking status: Never Smoker    Smokeless tobacco: Never Used   Substance Use Topics    Alcohol use: Not Currently     Frequency: Never     Binge frequency: Never    Drug use: Never       Objective:    Vitals:    04/29/21 1123   BP: 136/80   Pulse: 86   Resp: 16   Temp: 99 1 °F (37 3 °C)   Weight: 74 4 kg (164 lb)   Height: 5' 4" (1 626 m)        Physical Exam  Constitutional:       General: She is not in acute distress  Appearance: Normal appearance  She is well-developed  She is not diaphoretic  HENT:      Head: Normocephalic and atraumatic  Right Ear: Tympanic membrane, ear canal and external ear normal  There is no impacted cerumen  Left Ear: Tympanic membrane, ear canal and external ear normal  There is no impacted cerumen  Eyes:      General: No scleral icterus  Right eye: No discharge  Left eye: No discharge  Extraocular Movements: Extraocular movements intact  Conjunctiva/sclera: Conjunctivae normal       Pupils: Pupils are equal, round, and reactive to light  Neck:      Musculoskeletal: Normal range of motion and neck supple  Cardiovascular:      Rate and Rhythm: Normal rate and regular rhythm  Heart sounds: Normal heart sounds  No murmur  No friction rub  No gallop  Pulmonary:      Effort: Pulmonary effort is normal  No respiratory distress  Breath sounds: Normal breath sounds  No wheezing or rales  Chest:      Chest wall: No tenderness  Abdominal:      General: Bowel sounds are normal  There is no distension  Palpations: Abdomen is soft  There is no mass  Tenderness: There is no abdominal tenderness  There is no guarding or rebound  Musculoskeletal: Normal range of motion  General: No deformity  Skin:     General: Skin is warm and dry  Findings: No erythema or rash  Neurological:      Mental Status: She is alert and oriented to person, place, and time  Psychiatric:         Behavior: Behavior normal          Thought Content: Thought content normal          Judgment: Judgment normal            Preop labs/testing available and reviewed: yes               EKG yes    Echo no    Stress test/cath yes    PFT/Yury no    Functional capacity: Walking , 4-5 MPH               4 Mets   Pick the highest level patient can comfortably perform   4 mets or greater for surgery    RCRI  High Risk surgery? 1 Point  CAD History:         1 Point   MI; Positive Stress Test; CP due to Mi;  Nitrate Usage to control Angina; Pathologic Q wave on EKG  CHF Active:         1 Point   Pulm Edema; Paroxysmal Nocturnal Dyspnea;  Bibasilar Rales (crackles);S3; CHF on CXR  Cerebrovascular Disease (TIA or CVA):     1 Point  DM on Insulin:        1 Point  Serum Creat >2 0 mg/dl:       1 Point          Total Points: 0     Scorin: Class I, Very Low Risk (0 4%)     1: Class II, Low risk (0 9%)     2: Class III Moderate (6 6%)     3: Class IV High (>11%)      OSMAN Risk:  GFR:        For PCP:  If GFR>60, Hold ACE/ARB/Diuretic on the day of surgery, and NSAIDS 10 days before  If GFR<45, Consider PRE and POST op Nephrology Consult  If 46 <GFR> 59 :  Has Patient had OSMAN in last 6 Months? no   If YES: Preop Nephrology consult   If No:  [address] Nephrology consult  NOTE: Please use the above to review important meds for your specialty, the remainder "per anesthesia Guidelines "    NOTE: Please place an Inbasket message for "[facility] [place]" pool for complicated patients      Cecelia Tovar MD

## 2021-05-24 DIAGNOSIS — M79.7 FIBROMYALGIA: ICD-10-CM

## 2021-05-24 DIAGNOSIS — F41.1 GENERALIZED ANXIETY DISORDER: ICD-10-CM

## 2021-05-24 DIAGNOSIS — F33.41 MAJOR DEPRESSIVE DISORDER, RECURRENT, IN PARTIAL REMISSION (HCC): ICD-10-CM

## 2021-05-24 RX ORDER — QUETIAPINE FUMARATE 100 MG/1
100 TABLET, FILM COATED ORAL
Qty: 30 TABLET | Refills: 1 | Status: ON HOLD | OUTPATIENT
Start: 2021-05-24 | End: 2021-07-20 | Stop reason: SDUPTHER

## 2021-05-27 ENCOUNTER — OFFICE VISIT (OUTPATIENT)
Dept: NEUROLOGY | Facility: CLINIC | Age: 66
End: 2021-05-27
Payer: COMMERCIAL

## 2021-05-27 VITALS
SYSTOLIC BLOOD PRESSURE: 116 MMHG | WEIGHT: 161 LBS | HEIGHT: 64 IN | BODY MASS INDEX: 27.49 KG/M2 | HEART RATE: 97 BPM | DIASTOLIC BLOOD PRESSURE: 77 MMHG

## 2021-05-27 DIAGNOSIS — M54.16 RADICULOPATHY, LUMBAR REGION: ICD-10-CM

## 2021-05-27 DIAGNOSIS — M79.7 FIBROMYALGIA: ICD-10-CM

## 2021-05-27 DIAGNOSIS — Z98.1 S/P CERVICAL SPINAL FUSION: ICD-10-CM

## 2021-05-27 DIAGNOSIS — I95.1 ORTHOSTATIC HYPOTENSION: Primary | ICD-10-CM

## 2021-05-27 PROCEDURE — 1036F TOBACCO NON-USER: CPT | Performed by: PSYCHIATRY & NEUROLOGY

## 2021-05-27 PROCEDURE — 99214 OFFICE O/P EST MOD 30 MIN: CPT | Performed by: PSYCHIATRY & NEUROLOGY

## 2021-05-27 PROCEDURE — 3008F BODY MASS INDEX DOCD: CPT | Performed by: PSYCHIATRY & NEUROLOGY

## 2021-05-27 NOTE — PROGRESS NOTES
Return NeuroOutpatient Note        Oanh Bearden  [id number]  81 y o   1955       Radiculopathy        History obtained from:  Patient     HPI/Subjective:    Oanh Bearden is a 59 yo F with PMH of dizziness returns as f/u  In Jan visit, patient was found to be orthostatic and was placed on midodrine  She's now on midodrine 10mg tid  In rehab post cervical surgery, she had negative orthostatics  She feels lightheaded only when she rushes to do something  Patient's MRI LS spine showed asymmetric L4-5 foraminal stenosis  Patient underwent anterior fusion of C3-4 and C4-5  There was persistent left C4-5, 5-6 foraminal stenosis and it indicated possible radiculitis  Since surgery, she has not noted numbness in her hands  She is still recovering  Her gabapentin was increased to 600mg tid at last visit  Her EMGs of UE and LE are still pending  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       Past Medical History:   Diagnosis Date    Abdominal adhesions     Anesthesia complication     difficult to wake up    Anxiety     Depression     Depression     Disease of thyroid gland     Fibromyalgia     GERD (gastroesophageal reflux disease)     History of cholecystectomy 9/7/2019    Lazy eye     resolved: 3/27/17    Melanoma ([facility] [address] ) 2010    Osteopenia     with joint pain-elevated DEV    Psychiatric disorder     Tinnitus     Wears glasses     for reading     Social History     Socioeconomic History    Marital status: /Civil Union     Spouse name: Not on file    Number of children: Not on file    Years of education: Not on file    Highest education level: Not on file   Occupational History    Not on file   Social Needs    Financial resource strain: Not on file    Food insecurity     Worry: Not on file     Inability: Not on file    Transportation needs     Medical: Not on file     Non-medical: Not on file   Tobacco Use    Smoking status: Never Smoker    Smokeless tobacco: Never Used Substance and Sexual Activity    Alcohol use: Not Currently     Frequency: Never     Binge frequency: Never    Drug use: Never    Sexual activity: Not Currently   Lifestyle    Physical activity     Days per week: Not on file     Minutes per session: Not on file    Stress: Not on file   Relationships    Social connections     Talks on phone: Not on file     Gets together: Not on file     Attends Bahai service: Not on file     Active member of club or organization: Not on file     Attends meetings of clubs or organizations: Not on file     Relationship status: Not on file    Intimate partner violence     Fear of current or ex partner: Not on file     Emotionally abused: Not on file     Physically abused: Not on file     Forced sexual activity: Not on file   Other Topics Concern    Not on file   Social History Narrative    Not on file     Family History   Problem Relation Age of Onset    Heart disease Mother     Stroke Mother 58    COPD Mother     Arthritis Mother     Hypertension Father     Kidney disease Brother         kidney transplant    Multiple sclerosis Sister     No Known Problems Maternal Aunt     No Known Problems Maternal Uncle     No Known Problems Paternal Aunt     No Known Problems Paternal Uncle     No Known Problems Maternal Grandmother     No Known Problems Maternal Grandfather     No Known Problems Paternal Grandmother     No Known Problems Paternal Grandfather     ADD / ADHD Neg Hx     Anesthesia problems Neg Hx     Cancer Neg Hx     Clotting disorder Neg Hx     Collagen disease Neg Hx     Diabetes Neg Hx     Dislocations Neg Hx     Learning disabilities Neg Hx     Neurological problems Neg Hx     Osteoporosis Neg Hx     Rheumatologic disease Neg Hx     Scoliosis Neg Hx     Vascular Disease Neg Hx      Allergies   Allergen Reactions    Demerol [Meperidine] Lightheadedness     Reaction Date: 11Jan2006;     Sulfa Antibiotics Hives     Reaction Date: [id number];      Current Outpatient Medications on File Prior to Visit   Medication Sig Dispense Refill    albuterol (PROVENTIL HFA,VENTOLIN HFA) 90 mcg/act inhaler Inhale 2 puffs every 4 (four) hours as needed for wheezing 1 Inhaler 0    atorvastatin (LIPITOR) 20 mg tablet Take 20 mg by mouth daily   0    baclofen 10 mg tablet take 1 tablet by mouth three times a day 30 tablet 0    calcium carbonate-vitamin D (OSCAL-D) 500 mg-200 units per tablet Take 1 tablet by mouth daily with breakfast 30 tablet 0    diphenhydrAMINE (BENADRYL) 50 mg capsule Take 50 mg by mouth every 6 (six) hours as needed for itching      docusate sodium (COLACE) 100 mg capsule Take 1 capsule (100 mg total) by mouth 2 (two) times a day (Patient taking differently: Take 100 mg by mouth daily ) 10 capsule 0    DULoxetine (CYMBALTA) 60 mg delayed release capsule Take 1 capsule (60 mg total) by mouth 2 (two) times a day 60 capsule 0    gabapentin (NEURONTIN) 300 mg capsule Take 2 capsules (600 mg total) by mouth 3 (three) times a day 90 capsule 3    ipratropium-albuterol (DUO-NEB) 0 5-2 5 mg/3 mL nebulizer solution Take 1 vial (3 mL total) by nebulization 4 (four) times a day 100 mL 0    levothyroxine 50 mcg tablet take 1 tablet by mouth once daily 90 tablet 0    midodrine (PROAMATINE) 10 MG tablet Take 1 tablet (10 mg total) by mouth 3 (three) times a day 90 tablet 2    pantoprazole (PROTONIX) 40 mg tablet Take 1 tablet (40 mg total) by mouth daily 60 tablet 2    polyethylene glycol (MIRALAX) 17 g packet Take 17 g by mouth 2 (two) times a day  0    QUEtiapine (SEROquel) 100 mg tablet Take 1 tablet (100 mg total) by mouth daily at bedtime 30 tablet 1    traZODone (DESYREL) 100 mg tablet Take 1 tablet (100 mg total) by mouth daily at bedtime 30 tablet 2    meclizine (ANTIVERT) 25 mg tablet take 1 tablet by mouth every 6 hours if needed  IF DIZZINESS  0     No current facility-administered medications on file prior to visit  Review of Systems   Refer to positive review of systems in HPI  Review of Systems    Constitutional- No fever  Eyes- No visual change  ENT- Hearing normal  CV- No chest pain  Resp- No Shortness of breath  GI- No diarrhea  - Bladder normal  MS- No Arthritis   Skin- No rash  Psych- No depression  Endo- No DM  Heme- No nodes    Vitals:    05/27/21 1100   BP: 116/77   BP Location: Left arm   Patient Position: Sitting   Cuff Size: Adult   Pulse: 97   Weight: 73 kg (161 lb)   Height: 5' 4" (1 626 m)       PHYSICAL EXAM:  Appearance: No Acute Distress  Ophthalmoscopic: Disc Flat, Normal fundus  Mental status:  Orientation: Awake, Alert, and Orientedx3  Memory: Registation 3/3 Recall 3/3  Attention: normal  Knowledge: good  Language: No aphasia  Speech: No dysarthria  Cranial Nerves:  2 No Visual Defect on Confrontation, Pupils round, equal, reactive to light  3,4,6 Extraocular Movements Intact, no nystagmus  5 Facial Sensation Intact  7 No facial asymmetry  8 Intact hearing  9,10 Palate symmetric, normal gag  11 Good shoulder shrug  12 Tongue Midline  Gait: Stable  Coordination: No ataxia with finger to nose testing, and heel to shin  Sensory: Intact, Symmetric to pinprick, light touch, vibration, and joint position  Muscle Tone: Normal              Muscle exam:  Arm Right Left Leg Right Left   Deltoid 5/5 5/5 Iliopsoas 5/5 5/5   Biceps 5/5 5/5 Quads 5/5 5/5   Triceps 5/5 5/5 Hamstrings 5/5 5/5   Wrist Extension 5/5 5/5 Ankle Dorsi Flexion 5/5 5/5   Wrist Flexion 5/5 5/5 Ankle Plantar Flexion 5/5 5/5   Interossei 5/5 5/5 Ankle Eversion 5/5 5/5   APB 5/5 5/5 Ankle Inversion 5/5 5/5       Reflexes   RJ BJ TJ KJ AJ Plantars Cantrell's   Right 2+ 2+ 2+ 2+ 2+ Downgoing Not present   Left 2+ 2+ 2+ 2+ 2+ Downgoing Not present     Personal review of  Labs:                Diagnoses and all orders for this visit:      1  Orthostatic hypotension     2  S/P cervical spinal fusion     3  Radiculopathy, lumbar region     4  Fibromyalgia         Patient has remained stable  Will resume midodrine 10mg tid  Will await EMG results  Discussed that if her pain level is fairly controlled, she can lower gabapentin frequency during day so that she's not too sleepy                   Total time of encounter:  30 min  More than 50% of the time was used in counseling and/or coordination of care  Extent of counseling and/or coordination of care        Andreas Cheney MD  [facility]  [address]  [address]  [phone]

## 2021-06-07 ENCOUNTER — HOSPITAL ENCOUNTER (OUTPATIENT)
Dept: RADIOLOGY | Facility: HOSPITAL | Age: 66
Discharge: HOME | End: 2021-06-07
Payer: COMMERCIAL

## 2021-06-07 ENCOUNTER — TELEPHONE (OUTPATIENT)
Dept: BEHAVIORAL/MENTAL HEALTH CLINIC | Facility: CLINIC | Age: 66
End: 2021-06-07

## 2021-06-07 ENCOUNTER — TRANSCRIBE ORDERS (OUTPATIENT)
Dept: ADMINISTRATIVE | Facility: HOSPITAL | Age: 66
End: 2021-06-07

## 2021-06-07 DIAGNOSIS — M54.12 BRACHIAL NEURITIS: ICD-10-CM

## 2021-06-07 DIAGNOSIS — M50.90 DISC DISORDER OF CERVICAL REGION: Primary | ICD-10-CM

## 2021-06-07 DIAGNOSIS — M50.90 DISC DISORDER OF CERVICAL REGION: ICD-10-CM

## 2021-06-07 PROCEDURE — 72052 X-RAY EXAM NECK SPINE 6/>VWS: CPT

## 2021-06-07 NOTE — TELEPHONE ENCOUNTER
----- Message from [address] [place] [address] sent at 6/7/2021 11:15 AM EDT -----  Regarding: refill  Duloxetine 60mg BID  [facility]  4/26/21 LB

## 2021-06-08 DIAGNOSIS — F33.41 MAJOR DEPRESSIVE DISORDER, RECURRENT, IN PARTIAL REMISSION (HCC): ICD-10-CM

## 2021-06-10 DIAGNOSIS — F33.1 MODERATE EPISODE OF RECURRENT MAJOR DEPRESSIVE DISORDER (HCC): ICD-10-CM

## 2021-06-10 RX ORDER — DULOXETIN HYDROCHLORIDE 60 MG/1
60 CAPSULE, DELAYED RELEASE ORAL 2 TIMES DAILY
Qty: 60 CAPSULE | Refills: 0 | Status: CANCELLED | OUTPATIENT
Start: 2021-06-10

## 2021-06-10 RX ORDER — TRAZODONE HYDROCHLORIDE 100 MG/1
100 TABLET ORAL
Qty: 30 TABLET | Refills: 0 | Status: ON HOLD | OUTPATIENT
Start: 2021-06-10 | End: 2021-07-20 | Stop reason: SDUPTHER

## 2021-06-28 DIAGNOSIS — M79.7 FIBROMYALGIA: ICD-10-CM

## 2021-06-28 RX ORDER — GABAPENTIN 300 MG/1
600 CAPSULE ORAL 3 TIMES DAILY
Qty: 90 CAPSULE | Refills: 3 | Status: CANCELLED | OUTPATIENT
Start: 2021-06-28

## 2021-06-30 ENCOUNTER — TELEPHONE (OUTPATIENT)
Dept: FAMILY MEDICINE CLINIC | Facility: CLINIC | Age: 66
End: 2021-06-30

## 2021-06-30 NOTE — TELEPHONE ENCOUNTER
Pt has been trying to get a refill of her gabapentin and has been calling her neuro office and they have not been answering her call  Pt is worried she will withdraw   Pls advise

## 2021-06-30 NOTE — TELEPHONE ENCOUNTER
This is usually not a medication I could refill for her due to her other medications  According to the chart, she should have enough until around 7/10  Could someone call neuro for her?

## 2021-07-01 DIAGNOSIS — M79.7 FIBROMYALGIA: ICD-10-CM

## 2021-07-01 RX ORDER — GABAPENTIN 300 MG/1
600 CAPSULE ORAL 3 TIMES DAILY
Qty: 90 CAPSULE | Refills: 3 | Status: SHIPPED | OUTPATIENT
Start: 2021-07-01 | End: 2021-07-20 | Stop reason: HOSPADM

## 2021-07-01 NOTE — TELEPHONE ENCOUNTER
Spoke to [address] [place] [address] July from Neurology and she stated that she will send a msg over to Dr Dayan Hart   Patient informed     [place], [place]

## 2021-07-13 ENCOUNTER — APPOINTMENT (EMERGENCY)
Dept: RADIOLOGY | Facility: HOSPITAL | Age: 66
End: 2021-07-13
Payer: COMMERCIAL

## 2021-07-13 ENCOUNTER — HOSPITAL ENCOUNTER (EMERGENCY)
Facility: HOSPITAL | Age: 66
End: 2021-07-13
Attending: EMERGENCY MEDICINE | Admitting: EMERGENCY MEDICINE
Payer: COMMERCIAL

## 2021-07-13 ENCOUNTER — HOSPITAL ENCOUNTER (INPATIENT)
Facility: HOSPITAL | Age: 66
LOS: 7 days | Discharge: HOME | DRG: 885 | End: 2021-07-20
Attending: PSYCHIATRY & NEUROLOGY | Admitting: PSYCHIATRY & NEUROLOGY
Payer: COMMERCIAL

## 2021-07-13 VITALS
TEMPERATURE: 98 F | SYSTOLIC BLOOD PRESSURE: 155 MMHG | DIASTOLIC BLOOD PRESSURE: 84 MMHG | HEART RATE: 80 BPM | OXYGEN SATURATION: 99 % | RESPIRATION RATE: 16 BRPM

## 2021-07-13 DIAGNOSIS — F33.41 MAJOR DEPRESSIVE DISORDER, RECURRENT, IN PARTIAL REMISSION (HCC): ICD-10-CM

## 2021-07-13 DIAGNOSIS — F41.1 GAD (GENERALIZED ANXIETY DISORDER): Primary | ICD-10-CM

## 2021-07-13 DIAGNOSIS — F33.2 SEVERE EPISODE OF RECURRENT MAJOR DEPRESSIVE DISORDER, WITHOUT PSYCHOTIC FEATURES (HCC): ICD-10-CM

## 2021-07-13 DIAGNOSIS — R45.851 SUICIDAL IDEATION: ICD-10-CM

## 2021-07-13 DIAGNOSIS — F41.1 GENERALIZED ANXIETY DISORDER: ICD-10-CM

## 2021-07-13 DIAGNOSIS — M62.838 MUSCLE SPASM: ICD-10-CM

## 2021-07-13 DIAGNOSIS — K08.89 TOOTHACHE: ICD-10-CM

## 2021-07-13 DIAGNOSIS — F32.A DEPRESSION: ICD-10-CM

## 2021-07-13 DIAGNOSIS — M79.7 FIBROMYALGIA: ICD-10-CM

## 2021-07-13 DIAGNOSIS — F32.A DEPRESSION: Primary | ICD-10-CM

## 2021-07-13 PROBLEM — Z00.8 MEDICAL CLEARANCE FOR PSYCHIATRIC ADMISSION: Status: ACTIVE | Noted: 2021-07-13

## 2021-07-13 LAB
ALBUMIN SERPL BCP-MCNC: 3.5 G/DL (ref 3.5–5)
ALP SERPL-CCNC: 79 U/L (ref 46–116)
ALT SERPL W P-5'-P-CCNC: 30 U/L (ref 12–78)
AMPHETAMINES SERPL QL SCN: NEGATIVE
ANION GAP SERPL CALCULATED.3IONS-SCNC: 8 MMOL/L (ref 4–13)
APAP SERPL-MCNC: <2 UG/ML (ref 10–20)
AST SERPL W P-5'-P-CCNC: 17 U/L (ref 5–45)
BACTERIA UR QL AUTO: NORMAL /HPF
BARBITURATES UR QL: NEGATIVE
BASOPHILS # BLD AUTO: 0.04 THOUSANDS/ΜL (ref 0–0.1)
BASOPHILS NFR BLD AUTO: 1 % (ref 0–1)
BENZODIAZ UR QL: NEGATIVE
BILIRUB SERPL-MCNC: 0.26 MG/DL (ref 0.2–1)
BILIRUB UR QL STRIP: NEGATIVE
BUN SERPL-MCNC: 12 MG/DL (ref 5–25)
CALCIUM SERPL-MCNC: 8.8 MG/DL (ref 8.3–10.1)
CHLORIDE SERPL-SCNC: 107 MMOL/L (ref 100–108)
CLARITY UR: CLEAR
CO2 SERPL-SCNC: 29 MMOL/L (ref 21–32)
COCAINE UR QL: NEGATIVE
COLOR UR: COLORLESS
CREAT SERPL-MCNC: 0.86 MG/DL (ref 0.6–1.3)
EOSINOPHIL # BLD AUTO: 0.34 THOUSAND/ΜL (ref 0–0.61)
EOSINOPHIL NFR BLD AUTO: 5 % (ref 0–6)
ERYTHROCYTE [DISTWIDTH] IN BLOOD BY AUTOMATED COUNT: 13.2 % (ref 11.6–15.1)
ETHANOL SERPL-MCNC: <3 MG/DL (ref 0–3)
GFR SERPL CREATININE-BSD FRML MDRD: 72 ML/MIN/1.73SQ M
GLUCOSE SERPL-MCNC: 97 MG/DL (ref 65–140)
GLUCOSE UR STRIP-MCNC: NEGATIVE MG/DL
HCT VFR BLD AUTO: 38.2 % (ref 34.8–46.1)
HGB BLD-MCNC: 12 G/DL (ref 11.5–15.4)
HGB UR QL STRIP.AUTO: NEGATIVE
IMM GRANULOCYTES # BLD AUTO: 0.01 THOUSAND/UL (ref 0–0.2)
IMM GRANULOCYTES NFR BLD AUTO: 0 % (ref 0–2)
KETONES UR STRIP-MCNC: NEGATIVE MG/DL
LEUKOCYTE ESTERASE UR QL STRIP: ABNORMAL
LYMPHOCYTES # BLD AUTO: 1.43 THOUSANDS/ΜL (ref 0.6–4.47)
LYMPHOCYTES NFR BLD AUTO: 22 % (ref 14–44)
MCH RBC QN AUTO: 26.9 PG (ref 26.8–34.3)
MCHC RBC AUTO-ENTMCNC: 31.4 G/DL (ref 31.4–37.4)
MCV RBC AUTO: 86 FL (ref 82–98)
METHADONE UR QL: NEGATIVE
MONOCYTES # BLD AUTO: 0.71 THOUSAND/ΜL (ref 0.17–1.22)
MONOCYTES NFR BLD AUTO: 11 % (ref 4–12)
NEUTROPHILS # BLD AUTO: 4.11 THOUSANDS/ΜL (ref 1.85–7.62)
NEUTS SEG NFR BLD AUTO: 61 % (ref 43–75)
NITRITE UR QL STRIP: NEGATIVE
NON-SQ EPI CELLS URNS QL MICRO: NORMAL /HPF
NRBC BLD AUTO-RTO: 0 /100 WBCS
OPIATES UR QL SCN: NEGATIVE
OXYCODONE+OXYMORPHONE UR QL SCN: NEGATIVE
PCP UR QL: NEGATIVE
PH UR STRIP.AUTO: 7 [PH]
PLATELET # BLD AUTO: 233 THOUSANDS/UL (ref 149–390)
PMV BLD AUTO: 10.5 FL (ref 8.9–12.7)
POTASSIUM SERPL-SCNC: 4.8 MMOL/L (ref 3.5–5.3)
PROT SERPL-MCNC: 6.5 G/DL (ref 6.4–8.2)
PROT UR STRIP-MCNC: NEGATIVE MG/DL
RBC # BLD AUTO: 4.46 MILLION/UL (ref 3.81–5.12)
RBC #/AREA URNS AUTO: NORMAL /HPF
SALICYLATES SERPL-MCNC: <3 MG/DL (ref 3–20)
SARS-COV-2 RNA RESP QL NAA+PROBE: NEGATIVE
SODIUM SERPL-SCNC: 144 MMOL/L (ref 136–145)
SP GR UR STRIP.AUTO: <=1.005 (ref 1–1.03)
THC UR QL: NEGATIVE
TSH SERPL DL<=0.05 MIU/L-ACNC: 1.85 UIU/ML (ref 0.36–3.74)
UROBILINOGEN UR QL STRIP.AUTO: 0.2 E.U./DL
WBC # BLD AUTO: 6.64 THOUSAND/UL (ref 4.31–10.16)
WBC #/AREA URNS AUTO: NORMAL /HPF

## 2021-07-13 PROCEDURE — 71045 X-RAY EXAM CHEST 1 VIEW: CPT

## 2021-07-13 PROCEDURE — 99285 EMERGENCY DEPT VISIT HI MDM: CPT | Performed by: EMERGENCY MEDICINE

## 2021-07-13 PROCEDURE — 82077 ASSAY SPEC XCP UR&BREATH IA: CPT | Performed by: EMERGENCY MEDICINE

## 2021-07-13 PROCEDURE — 84443 ASSAY THYROID STIM HORMONE: CPT | Performed by: EMERGENCY MEDICINE

## 2021-07-13 PROCEDURE — 80179 DRUG ASSAY SALICYLATE: CPT | Performed by: EMERGENCY MEDICINE

## 2021-07-13 PROCEDURE — 93005 ELECTROCARDIOGRAM TRACING: CPT

## 2021-07-13 PROCEDURE — 99253 IP/OBS CNSLTJ NEW/EST LOW 45: CPT | Performed by: INTERNAL MEDICINE

## 2021-07-13 PROCEDURE — U0005 INFEC AGEN DETEC AMPLI PROBE: HCPCS | Performed by: EMERGENCY MEDICINE

## 2021-07-13 PROCEDURE — U0003 INFECTIOUS AGENT DETECTION BY NUCLEIC ACID (DNA OR RNA); SEVERE ACUTE RESPIRATORY SYNDROME CORONAVIRUS 2 (SARS-COV-2) (CORONAVIRUS DISEASE [COVID-19]), AMPLIFIED PROBE TECHNIQUE, MAKING USE OF HIGH THROUGHPUT TECHNOLOGIES AS DESCRIBED BY CMS-2020-01-R: HCPCS | Performed by: EMERGENCY MEDICINE

## 2021-07-13 PROCEDURE — 80307 DRUG TEST PRSMV CHEM ANLYZR: CPT | Performed by: EMERGENCY MEDICINE

## 2021-07-13 PROCEDURE — 81001 URINALYSIS AUTO W/SCOPE: CPT | Performed by: EMERGENCY MEDICINE

## 2021-07-13 PROCEDURE — 36415 COLL VENOUS BLD VENIPUNCTURE: CPT | Performed by: EMERGENCY MEDICINE

## 2021-07-13 PROCEDURE — 99285 EMERGENCY DEPT VISIT HI MDM: CPT

## 2021-07-13 PROCEDURE — 85025 COMPLETE CBC W/AUTO DIFF WBC: CPT | Performed by: EMERGENCY MEDICINE

## 2021-07-13 PROCEDURE — 80053 COMPREHEN METABOLIC PANEL: CPT | Performed by: EMERGENCY MEDICINE

## 2021-07-13 PROCEDURE — 80143 DRUG ASSAY ACETAMINOPHEN: CPT | Performed by: EMERGENCY MEDICINE

## 2021-07-13 RX ORDER — PANTOPRAZOLE SODIUM 40 MG/1
40 TABLET, DELAYED RELEASE ORAL DAILY
Status: DISCONTINUED | OUTPATIENT
Start: 2021-07-14 | End: 2021-07-20 | Stop reason: HOSPADM

## 2021-07-13 RX ORDER — TRAZODONE HYDROCHLORIDE 100 MG/1
100 TABLET ORAL
Status: DISCONTINUED | OUTPATIENT
Start: 2021-07-13 | End: 2021-07-20 | Stop reason: HOSPADM

## 2021-07-13 RX ORDER — AMOXICILLIN 250 MG
1 CAPSULE ORAL DAILY PRN
Status: CANCELLED | OUTPATIENT
Start: 2021-07-13

## 2021-07-13 RX ORDER — ACETAMINOPHEN 325 MG/1
975 TABLET ORAL EVERY 6 HOURS PRN
Status: CANCELLED | OUTPATIENT
Start: 2021-07-13

## 2021-07-13 RX ORDER — LORAZEPAM 1 MG/1
1 TABLET ORAL
Status: DISCONTINUED | OUTPATIENT
Start: 2021-07-13 | End: 2021-07-20 | Stop reason: HOSPADM

## 2021-07-13 RX ORDER — MIRTAZAPINE 7.5 MG/1
7.5 TABLET, FILM COATED ORAL
Status: DISCONTINUED | OUTPATIENT
Start: 2021-07-13 | End: 2021-07-20 | Stop reason: HOSPADM

## 2021-07-13 RX ORDER — BENZTROPINE MESYLATE 1 MG/ML
1 INJECTION INTRAMUSCULAR; INTRAVENOUS
Status: DISCONTINUED | OUTPATIENT
Start: 2021-07-13 | End: 2021-07-20 | Stop reason: HOSPADM

## 2021-07-13 RX ORDER — GABAPENTIN 300 MG/1
300 CAPSULE ORAL 3 TIMES DAILY
Status: DISCONTINUED | OUTPATIENT
Start: 2021-07-13 | End: 2021-07-20 | Stop reason: HOSPADM

## 2021-07-13 RX ORDER — CLONAZEPAM 0.5 MG/1
0.5 TABLET ORAL ONCE
Status: COMPLETED | OUTPATIENT
Start: 2021-07-13 | End: 2021-07-13

## 2021-07-13 RX ORDER — LEVOTHYROXINE SODIUM 0.05 MG/1
50 TABLET ORAL DAILY
Status: DISCONTINUED | OUTPATIENT
Start: 2021-07-14 | End: 2021-07-20 | Stop reason: HOSPADM

## 2021-07-13 RX ORDER — LORAZEPAM 2 MG/ML
1 INJECTION INTRAMUSCULAR
Status: DISCONTINUED | OUTPATIENT
Start: 2021-07-13 | End: 2021-07-20 | Stop reason: HOSPADM

## 2021-07-13 RX ORDER — OLANZAPINE 5 MG/1
5 TABLET ORAL
Status: DISCONTINUED | OUTPATIENT
Start: 2021-07-13 | End: 2021-07-20 | Stop reason: HOSPADM

## 2021-07-13 RX ORDER — ACETAMINOPHEN 325 MG/1
650 TABLET ORAL EVERY 4 HOURS PRN
Status: CANCELLED | OUTPATIENT
Start: 2021-07-13

## 2021-07-13 RX ORDER — OLANZAPINE 10 MG/1
5 INJECTION, POWDER, LYOPHILIZED, FOR SOLUTION INTRAMUSCULAR
Status: DISCONTINUED | OUTPATIENT
Start: 2021-07-13 | End: 2021-07-20 | Stop reason: HOSPADM

## 2021-07-13 RX ORDER — MAGNESIUM HYDROXIDE/ALUMINUM HYDROXICE/SIMETHICONE 120; 1200; 1200 MG/30ML; MG/30ML; MG/30ML
30 SUSPENSION ORAL EVERY 4 HOURS PRN
Status: CANCELLED | OUTPATIENT
Start: 2021-07-13

## 2021-07-13 RX ORDER — LORAZEPAM 2 MG/ML
1 INJECTION INTRAMUSCULAR
Status: CANCELLED | OUTPATIENT
Start: 2021-07-13

## 2021-07-13 RX ORDER — LORAZEPAM 0.5 MG/1
0.5 TABLET ORAL
Status: DISCONTINUED | OUTPATIENT
Start: 2021-07-13 | End: 2021-07-20 | Stop reason: HOSPADM

## 2021-07-13 RX ORDER — MAGNESIUM HYDROXIDE/ALUMINUM HYDROXICE/SIMETHICONE 120; 1200; 1200 MG/30ML; MG/30ML; MG/30ML
30 SUSPENSION ORAL EVERY 4 HOURS PRN
Status: DISCONTINUED | OUTPATIENT
Start: 2021-07-13 | End: 2021-07-20 | Stop reason: HOSPADM

## 2021-07-13 RX ORDER — BENZTROPINE MESYLATE 1 MG/ML
1 INJECTION INTRAMUSCULAR; INTRAVENOUS
Status: CANCELLED | OUTPATIENT
Start: 2021-07-13

## 2021-07-13 RX ORDER — ATORVASTATIN CALCIUM 20 MG/1
20 TABLET, FILM COATED ORAL DAILY
Status: DISCONTINUED | OUTPATIENT
Start: 2021-07-14 | End: 2021-07-20 | Stop reason: HOSPADM

## 2021-07-13 RX ORDER — BENZTROPINE MESYLATE 0.5 MG/1
0.5 TABLET ORAL
Status: DISCONTINUED | OUTPATIENT
Start: 2021-07-13 | End: 2021-07-20 | Stop reason: HOSPADM

## 2021-07-13 RX ORDER — LORAZEPAM 0.5 MG/1
0.5 TABLET ORAL
Status: CANCELLED | OUTPATIENT
Start: 2021-07-13

## 2021-07-13 RX ORDER — OLANZAPINE 10 MG/1
5 INJECTION, POWDER, LYOPHILIZED, FOR SOLUTION INTRAMUSCULAR
Status: CANCELLED | OUTPATIENT
Start: 2021-07-13

## 2021-07-13 RX ORDER — BACLOFEN 10 MG/1
10 TABLET ORAL 3 TIMES DAILY PRN
Status: DISCONTINUED | OUTPATIENT
Start: 2021-07-13 | End: 2021-07-20 | Stop reason: HOSPADM

## 2021-07-13 RX ORDER — BENZTROPINE MESYLATE 0.5 MG/1
0.5 TABLET ORAL
Status: CANCELLED | OUTPATIENT
Start: 2021-07-13

## 2021-07-13 RX ORDER — ALBUTEROL SULFATE 90 UG/1
2 AEROSOL, METERED RESPIRATORY (INHALATION) EVERY 4 HOURS PRN
Status: DISCONTINUED | OUTPATIENT
Start: 2021-07-13 | End: 2021-07-20 | Stop reason: HOSPADM

## 2021-07-13 RX ORDER — ACETAMINOPHEN 325 MG/1
975 TABLET ORAL EVERY 6 HOURS PRN
Status: DISCONTINUED | OUTPATIENT
Start: 2021-07-13 | End: 2021-07-20 | Stop reason: HOSPADM

## 2021-07-13 RX ORDER — LORAZEPAM 1 MG/1
1 TABLET ORAL
Status: CANCELLED | OUTPATIENT
Start: 2021-07-13

## 2021-07-13 RX ORDER — MIRTAZAPINE 15 MG/1
7.5 TABLET, FILM COATED ORAL
Status: CANCELLED | OUTPATIENT
Start: 2021-07-13

## 2021-07-13 RX ORDER — ACETAMINOPHEN 325 MG/1
650 TABLET ORAL EVERY 4 HOURS PRN
Status: DISCONTINUED | OUTPATIENT
Start: 2021-07-13 | End: 2021-07-20 | Stop reason: HOSPADM

## 2021-07-13 RX ORDER — OLANZAPINE 2.5 MG/1
5 TABLET ORAL
Status: CANCELLED | OUTPATIENT
Start: 2021-07-13

## 2021-07-13 RX ORDER — CALCIUM CARBONATE 300MG(750)
TABLET,CHEWABLE ORAL DAILY
COMMUNITY

## 2021-07-13 RX ORDER — AMOXICILLIN 250 MG
1 CAPSULE ORAL DAILY PRN
Status: DISCONTINUED | OUTPATIENT
Start: 2021-07-13 | End: 2021-07-20 | Stop reason: HOSPADM

## 2021-07-13 RX ORDER — MIDODRINE HYDROCHLORIDE 5 MG/1
10 TABLET ORAL 3 TIMES DAILY
Status: DISCONTINUED | OUTPATIENT
Start: 2021-07-13 | End: 2021-07-20 | Stop reason: HOSPADM

## 2021-07-13 RX ADMIN — TRAZODONE HYDROCHLORIDE 100 MG: 100 TABLET ORAL at 23:32

## 2021-07-13 RX ADMIN — GABAPENTIN 300 MG: 300 CAPSULE ORAL at 23:32

## 2021-07-13 RX ADMIN — CLONAZEPAM 0.5 MG: 0.5 TABLET ORAL at 15:55

## 2021-07-13 RX ADMIN — MIDODRINE HYDROCHLORIDE 10 MG: 5 TABLET ORAL at 23:33

## 2021-07-13 RX ADMIN — LORAZEPAM 1 MG: 1 TABLET ORAL at 22:24

## 2021-07-13 NOTE — EMTALA/ACUTE CARE TRANSFER
[address] [address]  Dept: [phone]      EMTALA TRANSFER CONSENT    NAME Katie Morrow                                         1955                              MRN [id number]    I have been informed of my rights regarding examination, treatment, and transfer   by Dr Karissa Whitehead DO    Benefits: Specialized equipment and/or services available at the receiving facility (Include comment)________________________    Risks: Potential for delay in receiving treatment      Consent for Transfer:  I acknowledge that my medical condition has been evaluated and explained to me by the emergency department physician or other qualified medical person and/or my attending physician, who has recommended that I be transferred to the service of  Accepting Physician: SHRAVAN Whittington at [address] [place] [address] Name, [address] : [facility]  The above potential benefits of such transfer, the potential risks associated with such transfer, and the probable risks of not being transferred have been explained to me, and I fully understand them  The doctor has explained that, in my case, the benefits of transfer outweigh the risks  I agree to be transferred  I authorize the performance of emergency medical procedures and treatments upon me in both transit and upon arrival at the receiving facility  Additionally, I authorize the release of any and all medical records to the receiving facility and request they be transported with me, if possible  I understand that the safest mode of transportation during a medical emergency is an ambulance and that the Hospital advocates the use of this mode of transport  Risks of traveling to the receiving facility by car, including absence of medical control, life sustaining equipment, such as oxygen, and medical personnel has been explained to me and I fully understand them      ([place] BELOW)  [  ]  I consent to the stated transfer and to be transported by ambulance/helicopter  [  ]  I consent to the stated transfer, but refuse transportation by ambulance and accept full responsibility for my transportation by car  I understand the risks of non-ambulance transfers and I exonerate the Hospital and its staff from any deterioration in my condition that results from this refusal     X___________________________________________    DATE  21  TIME________  Signature of patient or legally responsible individual signing on patient behalf           RELATIONSHIP TO PATIENT_________________________          Provider Certification    NAME Monet Valle                                         1955                              MRN [id number]    A medical screening exam was performed on the above named patient  Based on the examination:    Condition Necessitating Transfer The primary encounter diagnosis was Depression  A diagnosis of Suicidal ideation was also pertinent to this visit  Patient Condition: An emergency transfer is being made prior to stabilization due to the need for definitive care and the benefit of transfer outweighs the risk    Reason for Transfer: Level of Care needed not available at this facility    Transfer Requirements: [id number] [facility], [facility]  [facility]    · Space available and qualified personnel available for treatment as acknowledged by    · Agreed to accept transfer and to provide appropriate medical treatment as acknowledged by       SHRAVAN Apodaca  · Appropriate medical records of the examination and treatment of the patient are provided at the time of transfer   [address], [address] _______  · Transfer will be performed by qualified personnel from    and appropriate transfer equipment as required, including the use of necessary and appropriate life support measures      Provider Certification: I have examined the patient and explained the following risks and benefits of being transferred/refusing transfer to the patient/family:  The patient is stable for psychiatric transfer because they are medically stable, and is protected from harming him/herself or others during transport      Based on these reasonable risks and benefits to the patient and/or the unborn child(josé miguel), and based upon the information available at the time of the patients examination, I certify that the medical benefits reasonably to be expected from the provision of appropriate medical treatments at another medical facility outweigh the increasing risks, if any, to the individuals medical condition, and in the case of labor to the unborn child, from effecting the transfer      X____________________________________________ DATE 07/13/21        TIME_______      ORIGINAL - SEND TO MEDICAL RECORDS   COPY - SEND WITH PATIENT DURING TRANSFER

## 2021-07-13 NOTE — ED NOTES
Called [facility] for bed availability - [facility] did have beds - 201 completed with patient and faxed to [facility] about 13:30 - original placed in envelope and placed on patient's chart  [facility] notified of medical clearance about 15:15  Called [facility] @ 17:10 - voice mail was left  Patient is accepted at [address] [place] [address]  Patient is accepted by JAYY Coon per Rekha 124 [facility]  Transportation is scheduled for 21:00  - [place]  Patient may go to the floor at anytime    Nurse report is to be called to [phone] prior to patient transfer  Insurance Authorization for admission:   [facility]: [phone] (@ 17:25)  Spoke to [facility]  03 days approved  Level of care: inpt  Review on 7/15/21 with Francis [address] @ [phone]  Authorization #: [id number]    [facility]  Notified of departure time @ 18:00

## 2021-07-13 NOTE — ED PROVIDER NOTES
History  Chief Complaint   Patient presents with    Psychiatric Evaluation     patient states "Today started out as a bad day and it just got worse "  States she called the suicidal hotline and they convinced her to come to the ER  Patient states she has suicidal thoughts and has debating taking an OD of her meds  Patient here with complaint of suicidal ideations  She states that she has a lot of emotional pain in her head  She spoke to the suicidal hotline, however suicidal ideations persisted  Patient plans to overdose on 1 of her medications  Patient has a prior history of anxiety and depression  History provided by:  Patient  History limited by:  Psychiatric disorder   used: No    Suicidal  Presenting symptoms: suicidal thoughts    Degree of incapacity (severity): Moderate  Onset quality:  Gradual  Timing:  Constant  Progression:  Worsening  Chronicity:  New  Worsened by:  Nothing  Ineffective treatments:  None tried  Associated symptoms: no abdominal pain        Prior to Admission Medications   Prescriptions Last Dose Informant Patient Reported? Taking?    DULoxetine (CYMBALTA) 60 mg delayed release capsule   No No   Sig: Take 1 capsule (60 mg total) by mouth 2 (two) times a day   QUEtiapine (SEROquel) 100 mg tablet   No No   Sig: Take 1 tablet (100 mg total) by mouth daily at bedtime   albuterol (PROVENTIL HFA,VENTOLIN HFA) 90 mcg/act inhaler   No No   Sig: Inhale 2 puffs every 4 (four) hours as needed for wheezing   atorvastatin (LIPITOR) 20 mg tablet   Yes No   Sig: Take 20 mg by mouth daily    baclofen 10 mg tablet   No No   Sig: take 1 tablet by mouth three times a day   calcium carbonate-vitamin D (OSCAL-D) 500 mg-200 units per tablet   No No   Sig: Take 1 tablet by mouth daily with breakfast   diphenhydrAMINE (BENADRYL) 50 mg capsule   Yes No   Sig: Take 50 mg by mouth every 6 (six) hours as needed for itching   docusate sodium (COLACE) 100 mg capsule   No No   Sig: Take 1 capsule (100 mg total) by mouth 2 (two) times a day   Patient taking differently: Take 100 mg by mouth daily    gabapentin (NEURONTIN) 300 mg capsule   No No   Sig: Take 2 capsules (600 mg total) by mouth 3 (three) times a day   ipratropium-albuterol (DUO-NEB) 0 5-2 5 mg/3 mL nebulizer solution   No No   Sig: Take 1 vial (3 mL total) by nebulization 4 (four) times a day   levothyroxine 50 mcg tablet   No No   Sig: take 1 tablet by mouth once daily   midodrine (PROAMATINE) 10 MG tablet   No No   Sig: Take 1 tablet (10 mg total) by mouth 3 (three) times a day   pantoprazole (PROTONIX) 40 mg tablet   No No   Sig: Take 1 tablet (40 mg total) by mouth daily   polyethylene glycol (MIRALAX) 17 g packet   No No   Sig: Take 17 g by mouth 2 (two) times a day   traZODone (DESYREL) 100 mg tablet   No No   Sig: Take 1 tablet (100 mg total) by mouth daily at bedtime      Facility-Administered Medications: None       Past Medical History:   Diagnosis Date    Abdominal adhesions     Anesthesia complication     difficult to wake up    Anxiety     Depression     Depression     Disease of thyroid gland     Fibromyalgia     GERD (gastroesophageal reflux disease)     History of cholecystectomy 9/7/2019    Lazy eye     resolved: 3/27/17    Melanoma ([facility] [address] ) 2010    Osteopenia     with joint pain-elevated DEV    Psychiatric disorder     Tinnitus     Wears glasses     for reading       Past Surgical History:   Procedure Laterality Date    ABDOMINAL SURGERY      lysis of adhesions x 2    APPENDECTOMY      CERVICAL FUSION      CHOLECYSTECTOMY      open    DILATION AND CURETTAGE OF UTERUS      NEUROMA EXCISION Right 5/26/2017    Procedure: EXCISION MASS / FIBROMA FOOT;  Surgeon: Jesus Yu DPM;  Location: [facility];  Service:     PARS PLANA VITRECTOMY W/ REPAIR OF MACULAR HOLE  12/23/2020    RIGHT OOPHORECTOMY      WISDOM TOOTH EXTRACTION      x4       Family History   Problem Relation Age of Onset    Heart disease Mother     Stroke Mother 58    COPD Mother     Arthritis Mother     Hypertension Father     Kidney disease Brother         kidney transplant    Multiple sclerosis Sister     No Known Problems Maternal Aunt     No Known Problems Maternal Uncle     No Known Problems Paternal Aunt     No Known Problems Paternal Uncle     No Known Problems Maternal Grandmother     No Known Problems Maternal Grandfather     No Known Problems Paternal Grandmother     No Known Problems Paternal Grandfather     ADD / ADHD Neg Hx     Anesthesia problems Neg Hx     Cancer Neg Hx     Clotting disorder Neg Hx     Collagen disease Neg Hx     Diabetes Neg Hx     Dislocations Neg Hx     Learning disabilities Neg Hx     Neurological problems Neg Hx     Osteoporosis Neg Hx     Rheumatologic disease Neg Hx     Scoliosis Neg Hx     Vascular Disease Neg Hx      I have reviewed and agree with the history as documented  E-Cigarette/Vaping    E-Cigarette Use Never User      E-Cigarette/Vaping Substances    Nicotine No     THC No     CBD No     Flavoring No     Other No     Unknown No      Social History     Tobacco Use    Smoking status: Never Smoker    Smokeless tobacco: Never Used   Vaping Use    Vaping Use: Never used   Substance Use Topics    Alcohol use: Not Currently    Drug use: Never       Review of Systems   Constitutional: Negative for chills and fever  Respiratory: Negative for cough, chest tightness and shortness of breath  Gastrointestinal: Negative for abdominal pain, diarrhea, nausea and vomiting  Genitourinary: Negative for dysuria, frequency, hematuria and urgency  Musculoskeletal: Negative for back pain, neck pain and neck stiffness  Psychiatric/Behavioral: Positive for behavioral problems and suicidal ideas  All other systems reviewed and are negative  Physical Exam  Physical Exam  Vitals and nursing note reviewed     Constitutional:       General: She is not in acute distress  Appearance: She is well-developed  She is not diaphoretic  HENT:      Head: Normocephalic and atraumatic  Eyes:      Conjunctiva/sclera: Conjunctivae normal       Pupils: Pupils are equal, round, and reactive to light  Cardiovascular:      Rate and Rhythm: Normal rate and regular rhythm  Heart sounds: Normal heart sounds  No murmur heard  Pulmonary:      Effort: Pulmonary effort is normal  No respiratory distress  Breath sounds: Normal breath sounds  Abdominal:      General: Bowel sounds are normal  There is no distension  Palpations: Abdomen is soft  Tenderness: There is no abdominal tenderness  Musculoskeletal:         General: No deformity  Normal range of motion  Cervical back: Normal range of motion and neck supple  Skin:     General: Skin is warm and dry  Capillary Refill: Capillary refill takes less than 2 seconds  Coloration: Skin is not pale  Findings: No rash  Neurological:      General: No focal deficit present  Mental Status: She is alert and oriented to person, place, and time  Cranial Nerves: No cranial nerve deficit  Psychiatric:         Mood and Affect: Mood is depressed           Behavior: Behavior normal          Vital Signs  ED Triage Vitals [07/13/21 1248]   Temperature Pulse Respirations Blood Pressure SpO2   98 2 °F (36 8 °C) 88 17 165/90 100 %      Temp Source Heart Rate Source Patient Position - Orthostatic VS BP Location FiO2 (%)   Tympanic Monitor Lying Left arm --      Pain Score       --           Vitals:    07/13/21 1248   BP: 165/90   Pulse: 88   Patient Position - Orthostatic VS: Lying         Visual Acuity      ED Medications  Medications - No data to display    Diagnostic Studies  Results Reviewed     Procedure Component Value Units Date/Time    Novel Coronavirus (Covid-19),PCR [facility] - 2 Hour Stat [[id number]]  (Normal) Collected: 07/13/21 1330    Lab Status: Final result Specimen: Nares from Nose Updated: 07/13/21 1434     SARS-CoV-2 Negative    Narrative: The specimen collection materials, transport medium, and/or testing methodology utilized in the production of these test results have been proven to be reliable in a limited validation with an abbreviated program under the Emergency Utilization Authorization provided by the FDA  Testing reported as "Presumptive positive" will be confirmed with secondary testing to ensure result accuracy  Clinical caution and judgement should be used with the interpretation of these results with consideration of the clinical impression and other laboratory testing  Testing reported as "Positive" or "Negative" has been proven to be accurate according to standard laboratory validation requirements  All testing is performed with control materials showing appropriate reactivity at standard intervals  Rapid drug screen, urine [[id number]]  (Normal) Collected: 07/13/21 1355    Lab Status: Final result Specimen: Urine, Clean Catch Updated: 07/13/21 1420     Amph/Meth UR Negative     Barbiturate Ur Negative     Benzodiazepine Urine Negative     Cocaine Urine Negative     Methadone Urine Negative     Opiate Urine Negative     PCP Ur Negative     THC Urine Negative     Oxycodone Urine Negative    Narrative:      FOR MEDICAL PURPOSES ONLY  IF CONFIRMATION NEEDED PLEASE CONTACT THE LAB WITHIN 5 DAYS      Drug Screen Cutoff Levels:  AMPHETAMINE/METHAMPHETAMINES  1000 ng/mL  BARBITURATES     200 ng/mL  BENZODIAZEPINES     200 ng/mL  COCAINE      300 ng/mL  METHADONE      300 ng/mL  OPIATES      300 ng/mL  PHENCYCLIDINE     25 ng/mL  THC       50 ng/mL  OXYCODONE      100 ng/mL    Urine Microscopic [[id number]]  (Normal) Collected: 07/13/21 1355    Lab Status: Final result Specimen: Urine, Clean Catch Updated: 07/13/21 1417     RBC, UA 0-1 /hpf      WBC, UA 0-1 /hpf      Epithelial Cells Occasional /hpf      Bacteria, UA Occasional /hpf     UA (URINE) with reflex to Scope [[id number]]  (Abnormal) Collected: 07/13/21 1355    Lab Status: Final result Specimen: Urine, Clean Catch Updated: 07/13/21 1407     Color, UA Colorless     Clarity, UA Clear     Specific Gravity, UA <=1 005     pH, UA 7 0     Leukocytes, UA Trace     Nitrite, UA Negative     Protein, UA Negative mg/dl      Glucose, UA Negative mg/dl      Ketones, UA Negative mg/dl      Urobilinogen, UA 0 2 E U /dl      Bilirubin, UA Negative     Blood, UA Negative    TSH, 3rd generation with Free T4 reflex [[id number]]  (Normal) Collected: 07/13/21 1331    Lab Status: Final result Specimen: Blood from Arm, Right Updated: 07/13/21 1406     TSH 3RD GENERATON 1 852 uIU/mL     Narrative:      Patients undergoing fluorescein dye angiography may retain small amounts of fluorescein in the body for 48-72 hours post procedure  Samples containing fluorescein can produce falsely depressed TSH values  If the patient had this procedure,a specimen should be resubmitted post fluorescein clearance  Salicylate level [[id number]]  (Abnormal) Collected: 07/13/21 1331    Lab Status: Final result Specimen: Blood from Arm, Right Updated: 21/61/06 1512     Salicylate Lvl <3 mg/dL     Acetaminophen level-If concentration is detectable, please discuss with medical  on call   [[id number]]  (Abnormal) Collected: 07/13/21 1331    Lab Status: Final result Specimen: Blood from Arm, Right Updated: 07/13/21 1406     Acetaminophen Level <2 ug/mL     Comprehensive metabolic panel [[id number]] Collected: 07/13/21 1331    Lab Status: Final result Specimen: Blood from Arm, Right Updated: 07/13/21 1355     Sodium 144 mmol/L      Potassium 4 8 mmol/L      Chloride 107 mmol/L      CO2 29 mmol/L      ANION GAP 8 mmol/L      BUN 12 mg/dL      Creatinine 0 86 mg/dL      Glucose 97 mg/dL      Calcium 8 8 mg/dL      AST 17 U/L      ALT 30 U/L      Alkaline Phosphatase 79 U/L      Total Protein 6 5 g/dL      Albumin 3 5 g/dL      Total Bilirubin 0 26 mg/dL eGFR 72 ml/min/1 73sq m     Narrative:      [place] guidelines for Chronic Kidney Disease (CKD):     Stage 1 with normal or high GFR (GFR > 90 mL/min/1 73 square meters)    Stage 2 Mild CKD (GFR = 60-89 mL/min/1 73 square meters)    Stage 3A Moderate CKD (GFR = 45-59 mL/min/1 73 square meters)    Stage 3B Moderate CKD (GFR = 30-44 mL/min/1 73 square meters)    Stage 4 Severe CKD (GFR = 15-29 mL/min/1 73 square meters)    Stage 5 End Stage CKD (GFR <15 mL/min/1 73 square meters)  Note: GFR calculation is accurate only with a steady state creatinine    Ethanol [[id number]]  (Normal) Collected: 07/13/21 1331    Lab Status: Final result Specimen: Blood from Arm, Right Updated: 07/13/21 1353     Ethanol Lvl <3 mg/dL     CBC and differential [[id number]] Collected: 07/13/21 1331    Lab Status: Final result Specimen: Blood from Arm, Right Updated: 07/13/21 1338     WBC 6 64 Thousand/uL      RBC 4 46 Million/uL      Hemoglobin 12 0 g/dL      Hematocrit 38 2 %      MCV 86 fL      MCH 26 9 pg      MCHC 31 4 g/dL      RDW 13 2 %      MPV 10 5 fL      Platelets 115 Thousands/uL      nRBC 0 /100 WBCs      Neutrophils Relative 61 %      Immat GRANS % 0 %      Lymphocytes Relative 22 %      Monocytes Relative 11 %      Eosinophils Relative 5 %      Basophils Relative 1 %      Neutrophils Absolute 4 11 Thousands/µL      Immature Grans Absolute 0 01 Thousand/uL      Lymphocytes Absolute 1 43 Thousands/µL      Monocytes Absolute 0 71 Thousand/µL      Eosinophils Absolute 0 34 Thousand/µL      Basophils Absolute 0 04 Thousands/µL                  XR chest 1 view portable    (Results Pending)              Procedures  Procedures         ED Course                                           MDM  Number of Diagnoses or Management Options  Suicidal ideation: new and requires workup  Diagnosis management comments: Patient is medically cleared for psychiatric evaluation and treatment       Amount and/or Complexity of Data Reviewed  Clinical lab tests: ordered and reviewed    Risk of Complications, Morbidity, and/or Mortality  Presenting problems: high  Diagnostic procedures: high  Management options: high    Patient Progress  Patient progress: improved      Disposition  Final diagnoses:   Suicidal ideation     Time reflects when diagnosis was documented in both MDM as applicable and the Disposition within this note     Time User Action Codes Description Comment    7/13/2021  3:16 PM Leandro Dominguez Add [N16 [id number]] Suicidal ideation       ED Disposition     ED Disposition Condition Date/Time Comment    Transfer to [address] [place] [address] Jul 13, 2021  3:16 PM Jose Frank should be transferred out to [facility] and has been medically cleared  Follow-up Information    None         Patient's Medications   Discharge Prescriptions    No medications on file     No discharge procedures on file      PDMP Review       Value Time User    PDMP Reviewed  Yes 1/25/2021  7:36 PM YURI Lara          ED Provider  Electronically Signed by           Lucio Saldivar DO  07/13/21 6110 Franklin Interiano DO  07/13/21 8239

## 2021-07-13 NOTE — ED NOTES
59 yo MWF self presents to ER after speaking to the suicide hotline for about 45 minutes due to "SI with thoughts to "OD on Trazadone"  The patient is known to [facility]  Stressors include: "had to call provider multiple times to get a refill of Seroquel - never called back when I tried to get my Cymbalta refilled and stopped taking it 1 month ago; need more money and I am looking for a job but I am to old"  Mood = "depressed, very depressed"  Symptoms include: +SI with plan (as above); poor appetite; poor concentration; poor energy level; poor self esteem; lack of any perceived social supports; not thinking clearly or rationally; +hopelessness/anhedonia; "very anxious - stomach in knots; can't breathe; playing with my hands; feel very closed off"; some paranoia - "why people won't hire me - it is irrational"; 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

## 2021-07-14 PROBLEM — F43.10 PTSD (POST-TRAUMATIC STRESS DISORDER): Chronic | Status: ACTIVE | Noted: 2021-07-14

## 2021-07-14 LAB
ATRIAL RATE: 75 BPM
ATRIAL RATE: 80 BPM
P AXIS: 53 DEGREES
P AXIS: 59 DEGREES
PR INTERVAL: 164 MS
PR INTERVAL: 166 MS
QRS AXIS: -17 DEGREES
QRS AXIS: -18 DEGREES
QRSD INTERVAL: 82 MS
QRSD INTERVAL: 82 MS
QT INTERVAL: 398 MS
QT INTERVAL: 404 MS
QTC INTERVAL: 451 MS
QTC INTERVAL: 459 MS
T WAVE AXIS: 5 DEGREES
T WAVE AXIS: 8 DEGREES
VENTRICULAR RATE: 75 BPM
VENTRICULAR RATE: 80 BPM

## 2021-07-14 PROCEDURE — 93010 ELECTROCARDIOGRAM REPORT: CPT | Performed by: INTERNAL MEDICINE

## 2021-07-14 PROCEDURE — 93005 ELECTROCARDIOGRAM TRACING: CPT

## 2021-07-14 PROCEDURE — 99222 1ST HOSP IP/OBS MODERATE 55: CPT | Performed by: PSYCHIATRY & NEUROLOGY

## 2021-07-14 RX ORDER — QUETIAPINE FUMARATE 100 MG/1
100 TABLET, FILM COATED ORAL
Status: DISCONTINUED | OUTPATIENT
Start: 2021-07-14 | End: 2021-07-20 | Stop reason: HOSPADM

## 2021-07-14 RX ORDER — DULOXETIN HYDROCHLORIDE 30 MG/1
30 CAPSULE, DELAYED RELEASE ORAL DAILY
Status: DISCONTINUED | OUTPATIENT
Start: 2021-07-14 | End: 2021-07-16

## 2021-07-14 RX ADMIN — ATORVASTATIN CALCIUM 20 MG: 20 TABLET, FILM COATED ORAL at 08:51

## 2021-07-14 RX ADMIN — GABAPENTIN 300 MG: 300 CAPSULE ORAL at 21:24

## 2021-07-14 RX ADMIN — ACETAMINOPHEN 650 MG: 325 TABLET ORAL at 09:12

## 2021-07-14 RX ADMIN — GABAPENTIN 300 MG: 300 CAPSULE ORAL at 08:50

## 2021-07-14 RX ADMIN — GABAPENTIN 300 MG: 300 CAPSULE ORAL at 17:13

## 2021-07-14 RX ADMIN — LEVOTHYROXINE SODIUM 50 MCG: 50 TABLET ORAL at 06:15

## 2021-07-14 RX ADMIN — QUETIAPINE FUMARATE 100 MG: 100 TABLET ORAL at 21:27

## 2021-07-14 RX ADMIN — MIDODRINE HYDROCHLORIDE 10 MG: 5 TABLET ORAL at 08:50

## 2021-07-14 RX ADMIN — PANTOPRAZOLE SODIUM 40 MG: 40 TABLET, DELAYED RELEASE ORAL at 08:51

## 2021-07-14 RX ADMIN — DULOXETINE 30 MG: 30 CAPSULE, DELAYED RELEASE ORAL at 14:55

## 2021-07-14 RX ADMIN — TRAZODONE HYDROCHLORIDE 100 MG: 100 TABLET ORAL at 21:26

## 2021-07-14 NOTE — NURSING NOTE
Pt is visible on unit  Pt is tearful on approach  Pt reported 10/10 depression and 4/4 anxiety  When asked why she is depressed and anxious pt stated, "I feel crummy " "I was feeling a little better when I woke up " "I can't handle being here I have no control " Support given  Pt denied SI/HI  Pt is cooperative and medication compliant  Pt is seen attending AM groups   Needs met at this time

## 2021-07-14 NOTE — CONSULTS
[place] 1955, 60 y o  female MRN: [id number]  Unit/Bed#: [facility] 102-13 Encounter: [id number]  Primary Care Provider: Jonna Ochoa MD   Date and time admitted to hospital: 7/13/2021  9:48 PM    Inpatient consult for Medical Clearance for [facility] patient  Consult performed by: Zulay Gurrola PA-C  Consult ordered by: SHRAVAN Zavala          Medical clearance for psychiatric admission  Assessment & Plan   The patient was evaluated and is medically clear for admission to the [facility] for treatment of the underlying psychiatric illness  Neuropathy  Assessment & Plan  · Patient follows outpatient with Neurology  · Appointment for next week for EMG  · Continue Gabapentin 300mg TID  · Continue baclofen 10mg TID as needed for muscle spasms    Gastroesophageal reflux disease  Assessment & Plan  · Continue Protonix 40mg daily    Adult hypothyroidism  Assessment & Plan  · Recent TSH (7/13/2021): 1 852  · Continue levothyroxine 50mcg daily    Mixed dyslipidemia  Assessment & Plan  · Continue lipitor 20mg daily    Orthostatic hypotension  Assessment & Plan  · Patient follows with cardiology outpatient  · Encouraged oral hydration and slow position changes  · Continue midodrine 10mg TID    Cervical myelopathy with cervical radiculopathy (HCC)  Assessment & Plan  · S/p fusion of C4-C5 in May 6 2021    Alcohol dependence in remission [facility])  Assessment & Plan  · Reports being 25 years sober  · Encouraged continue alcohol cessation    ECT Clearance:   History of recent seizure or stroke:  History of TIA back in 2018  No seizure history   History of pheochromocytoma:  No   History of active bleeding (Intracranial hemorrhage, aneurysm or AVM):  No   History of metallic implants in the head or neck:  No   History of increased intracranial pressure with mass effect:  No   Does the patient have a current arrhythmia?   No      Based on above criteria, Patient is medically cleared for ECT should it be recommended  Counseling / Coordination of Care Time: 45 minutes  Greater than 50% of total time spent on patient counseling and coordination of care  Collaboration of Care: Were Recommendations Directly Discussed with Primary Treatment Team? - Yes     History of Present Illness:    Paula Conklin is a 60 y o  female who is originally admitted to the psychiatry service due to increasing depression with suicidal ideation  We are consulted for medical clearance for admission to [facility] and treatment of underlying psychiatric illness  Patient with past medical history of hypothyroidism, alcohol dependence in remission, GERD, dyslipidemia, neuropathy, orthostatic hypotension, cervical myelopathy with cervical radiculopathy  Patient currently denies any headaches, dizziness, chest pain, shortness of breath, nausea/vomiting/diarrhea, urinary symptoms at this time  Patient has a psychiatric history significant for depression  No other medical complaints at this time  Review of Systems:    Review of Systems   Constitutional: Negative for chills and fever  HENT: Negative for congestion, ear pain, rhinorrhea and sore throat  Eyes: Negative for pain and visual disturbance  Respiratory: Negative for cough and shortness of breath  Cardiovascular: Negative for chest pain and palpitations  Gastrointestinal: Negative for abdominal pain, constipation, diarrhea, nausea and vomiting  Genitourinary: Negative for dysuria, hematuria and urgency  Musculoskeletal: Negative for arthralgias, back pain and myalgias  Skin: Negative for color change and rash  Neurological: Negative for dizziness, seizures, syncope and headaches  Psychiatric/Behavioral: Positive for dysphoric mood and suicidal ideas  All other systems reviewed and are negative        Past Medical and Surgical History:     Past Medical History:   Diagnosis Date    Abdominal adhesions     Anesthesia complication     difficult to wake up    Anxiety     Depression     Depression     Disease of thyroid gland     Fibromyalgia     GERD (gastroesophageal reflux disease)     History of cholecystectomy 9/7/2019    Lazy eye     resolved: 3/27/17    Melanoma ([address] ) 2010    Osteopenia     with joint pain-elevated DEV    Psychiatric disorder     Tinnitus     Wears glasses     for reading       Past Surgical History:   Procedure Laterality Date    ABDOMINAL SURGERY      lysis of adhesions x 2    APPENDECTOMY      CERVICAL FUSION      CHOLECYSTECTOMY      open    DILATION AND CURETTAGE OF UTERUS      NEUROMA EXCISION Right 5/26/2017    Procedure: EXCISION MASS / FIBROMA FOOT;  Surgeon: Radha Knight DPM;  Location: [address] [place] [address];  Service:     PARS PLANA VITRECTOMY W/ REPAIR OF MACULAR HOLE  12/23/2020    RIGHT OOPHORECTOMY      WISDOM TOOTH EXTRACTION      x4       Meds/Allergies:    all medications and allergies reviewed    Allergies:    Allergies   Allergen Reactions    Demerol [Meperidine] Lightheadedness     Reaction Date: 11Jan2006;     Sulfa Antibiotics Hives     Reaction Date: 11Jan2006;        Social History:     Marital Status: /Civil Union    Substance Use History:   Social History     Substance and Sexual Activity   Alcohol Use Not Currently     Social History     Tobacco Use   Smoking Status Never Smoker   Smokeless Tobacco Never Used     Social History     Substance and Sexual Activity   Drug Use Never       Family History:    Family History   Problem Relation Age of Onset    Heart disease Mother     Stroke Mother 58    COPD Mother     Arthritis Mother     Hypertension Father     Kidney disease Brother         kidney transplant    Multiple sclerosis Sister     No Known Problems Maternal Aunt     No Known Problems Maternal Uncle     No Known Problems Paternal Aunt     No Known Problems Paternal Uncle     No Known Problems Maternal Grandmother     No Known Problems Maternal Grandfather     No Known Problems Paternal Grandmother     No Known Problems Paternal Grandfather     ADD / ADHD Neg Hx     Anesthesia problems Neg Hx     Cancer Neg Hx     Clotting disorder Neg Hx     Collagen disease Neg Hx     Diabetes Neg Hx     Dislocations Neg Hx     Learning disabilities Neg Hx     Neurological problems Neg Hx     Osteoporosis Neg Hx     Rheumatologic disease Neg Hx     Scoliosis Neg Hx     Vascular Disease Neg Hx        Physical Exam:     Vitals:   Blood Pressure: 117/79 (07/13/21 2226)  Pulse: 84 (07/13/21 2226)  Temperature: 97 8 °F (36 6 °C) (07/13/21 2226)  Temp Source: Temporal (07/13/21 2226)  Respirations: 18 (07/13/21 2226)  SpO2: 96 % (07/13/21 2226)    Physical Exam  Vitals reviewed  Constitutional:       General: She is not in acute distress  Appearance: She is normal weight  HENT:      Head: Normocephalic and atraumatic  Nose: No congestion or rhinorrhea  Mouth/Throat:      Mouth: Mucous membranes are moist       Pharynx: Oropharynx is clear  Eyes:      General: No scleral icterus  Pupils: Pupils are equal, round, and reactive to light  Cardiovascular:      Rate and Rhythm: Normal rate and regular rhythm  Pulses: Normal pulses  Heart sounds: No murmur heard  Pulmonary:      Effort: Pulmonary effort is normal       Breath sounds: Normal breath sounds  No wheezing or rales  Abdominal:      General: Bowel sounds are normal  There is no distension  Palpations: Abdomen is soft  Tenderness: There is no abdominal tenderness  Musculoskeletal:         General: Normal range of motion  Right lower leg: No edema  Left lower leg: No edema  Skin:     General: Skin is warm and dry  Findings: No rash  Neurological:      Mental Status: She is alert and oriented to person, place, and time  Psychiatric:         Mood and Affect: Mood is depressed  Affect is tearful           Additional Data:     Lab Results: I have personally reviewed pertinent reports  Results from last 7 days   Lab Units 07/13/21  1331   WBC Thousand/uL 6 64   HEMOGLOBIN g/dL 12 0   HEMATOCRIT % 38 2   PLATELETS Thousands/uL 233   NEUTROS PCT % 61   LYMPHS PCT % 22   MONOS PCT % 11   EOS PCT % 5     Results from last 7 days   Lab Units 07/13/21  1331   SODIUM mmol/L 144   POTASSIUM mmol/L 4 8   CHLORIDE mmol/L 107   CO2 mmol/L 29   BUN mg/dL 12   CREATININE mg/dL 0 86   ANION GAP mmol/L 8   CALCIUM mg/dL 8 8   ALBUMIN g/dL 3 5   TOTAL BILIRUBIN mg/dL 0 26   ALK PHOS U/L 79   ALT U/L 30   AST U/L 17   GLUCOSE RANDOM mg/dL 97             Lab Results   Component Value Date/Time    HGBA1C 6 1 (H) 02/06/2020 08:20 AM    HGBA1C 6 0 02/14/2019 05:36 AM    HGBA1C 6 0 03/04/2018 11:50 AM           EKG, Pathology, and Other Studies Reviewed on Admission:   · EKG (2/3/2021): Normal sinus rhythm with moderate voltage criteria for LVH  Obtain baseline ECG    ** Please Note: This note has been constructed using a voice recognition system   **
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
## 2021-07-14 NOTE — ASSESSMENT & PLAN NOTE
· Patient follows with cardiology outpatient  · Encouraged oral hydration and slow position changes  · Continue midodrine 10mg TID

## 2021-07-14 NOTE — PLAN OF CARE
Problem: Ineffective Coping  Goal: Cooperates with admission process  Description: Interventions:   - Complete admission process  Outcome: Progressing     Problem: Ineffective Coping  Goal: Identifies ineffective coping skills  Outcome: Progressing

## 2021-07-14 NOTE — CASE MANAGEMENT
07/14/21 0838   Team Meeting   Meeting Type Daily Rounds   Initial Conference Date 07/14/21   Team Members Present   Team Members Present Physician;Nurse;;; Occupational Therapist   Physician Team Member Dr Kevin Orona; [address] Lavin[address] Reid   Nursing Team Member [facility] Management Team Member Hawk Ramsay   Social Work Team Member Dequan Soler   OT Team Member Troy Villa   Patient/Family Present   Patient Present No   Patient's Family Present No     Not a 30-day readmission; 201 admission from [facility] ED for SI with plan to OD on Trazadone  Pt stopped Cymbalta 6 weeks ago  Hx of ETOH abuse -- quit 30 yrs ago  Stressors include: finances and difficulty finding a job  Past SI and SA 40 yrs ago by OD'ing on Tylenol  Past [facility] admission   7/10 depression, 4/4 anxiety, received PRN Ativan last night at 2230 -- effective, slept

## 2021-07-14 NOTE — NURSING NOTE
PRN Tylenol 650 mg given for 5/10 neck pain  Reassessed pain at 10:12 pt reported 2/10 with tylenol being effective

## 2021-07-14 NOTE — ASSESSMENT & PLAN NOTE
· Patient follows outpatient with Neurology  · Appointment for next week for EMG  · Continue Gabapentin 300mg TID  · Continue baclofen 10mg TID as needed for muscle spasms

## 2021-07-14 NOTE — PLAN OF CARE
Pt  Attended all three groups and completed a self assessment interview  Pt  personal goal Is" to feel less hopeless about her current lack of job  situation and to be less overwhelmed by her illness "Pt  was able to express self openly when discussing some of her stressors    Problem: Ineffective Coping  Goal: Participates in unit activities  Description: Interventions:  - Provide therapeutic environment   - Provide required programming   - Redirect inappropriate behaviors   Outcome: Progressing

## 2021-07-14 NOTE — NURSING NOTE
Pt admitted on 201 from [facility] ED for SI with plan to OD on trazodone  Pt reports sudden onset of SI thoughts this a m  Pt called suicide hotline and then came to ED as recommended  Pt reports financial stressors and difficulty finding job  Pt has not been taking cymbalta for past 6 weeks, due to difficulty with provider  Pt reports experiencing SI in past, reports OD on tylenol 40 years ago  Hx IP hospitalizations  Hx spinal surgery 5/2021  Reports anxiety 4/4 and depression 7/10 upon admission  Denies HI, AV, contracts for safety upon admission  Ativan prn administered for anxiety

## 2021-07-14 NOTE — ASSESSMENT & PLAN NOTE
 The patient was evaluated and is medically clear for admission to the [facility] for treatment of the underlying psychiatric illness

## 2021-07-14 NOTE — DISCHARGE INSTR - APPOINTMENTS
Daljit Baird RN, our [employer], will be calling you after your discharge, on the phone number that you provided  She will be available as an additional support, if needed  If you wish to speak with her, you may contact Deb Louis at [phone]

## 2021-07-14 NOTE — DISCHARGE INSTR - OTHER ORDERS
You are being discharged to: [address] [facility] you have identified during your hospitalization that led to your admission suicidal ideation, distressed mood are seeking employment  Coping skills you have identified during your hospitalization include gardening, reading  If you are unable to deal with your distressed mood alone please contact Gricelda Perea, if that is not effective and you continue to have suicidal ideation, distressed mood, overwhelmed, in crisis please contact [facility] [phone], dial [phone] or go to the nearest emergency center  *Philip 35 [phone]  *National Suicide Prevention Lifeline:  2-910.177.7118  *Alcohol Anonymous: [address] Mental Illness ([place]) HELPLINE: 434.796.6225/Website: www sidney org  *Substance Abuse and [address] [facility]) [employer], which is a confidential, free, 24-hour-a-day, 365-day-a-year, information service for individuals and family members facing mental health and/or substance use disorders  This service provides referrals to local treatment facilities, support groups, and community-based organizations  Callers can also order free publications and other information  Call 0-211.317.9048/Website: www [facility] gov  *[employer] 2-1-1: This is a toll free, confidential, 24-hour-a-day service which connects you to a community  in your area who can help you find services and resources that are available to you locally and provide critical services that can improve and save lives  Call: 211  /Website: https://[email or web address] Centeradal alvarenga/          Lanie Grullon RESOURCES    24 Hour Hotline: [address] [facility] at 572-102-6382  In [place] call the [address] [phone] to speak with someone NOW  If not in crisis but need to talk, for sale call the Peer Lanie Costello 76 at [phone]   The Carmela Mayer is a service of the [address] [place] [address] in [place]  Hours are Monday to Friday 8 am to 10 pm, see Saturday and Sunday 5 pm to 10 pm, and holidays 3 pm to 10 pm     The [address] 731 054 595 program provides emergency mental health services to [facility] residents who are experiencing a mental health crisis, their families and caregivers  The primary goals of the CIU/ES program are to prevent suicide, homicide and other violent and self-destructive behaviors or dangerous situations from occurring as the result of an individual's untreated mental illness or as a result of a family experiencing a mental health crisis  CIU/ES program provides a range of crisis responses that are available 24 hours/day, 365 days/year and that are provided by specially-trained professional staff who are also certified by the [address] [place] [address])    Crisis services include:  A 24-hour crisis telephone hotline staffed by professionals ([phone])  24-hour mobile outreach capability to consumers (adults and children) throughout [facility] to provide on-site assessment  Psychiatric/Behavioral health Crisis management  Assessment for psychiatric medication  Assessment for Psychiatric Hospitalization  Linkage with other community services for both the consumer and family members/caregivers  Linkage with follow-up mental health, health and     [address] [facility]  [address] [facility] 6  HOTLINE: [phone] [address] [employer]  [facility] Future  [address], [address]  [phone]    Homeless Services ([address])  Caitlyn Ryder [address] [place] [address]  [address], [address]  [phone] [place] Saturday's 10am-12noon [address] to [phone] from anywhere in the [facility], anytime, about any type of crisis  A live, trained Crisis Counselor receives the text and lets you know that they are here to listen  The volunteer Crisis Counselor will help you move from a hot moment to a cool moment  Warm Line: (711) 217-1191, ([phone], [phone] If it is not quite a crisis, but you want to talk to someone, 24 hours/day, 7 days/week: Someone to listen; someone who cares  Dial 2-1-1 to get connected/get help  Free, confidential information & referral available 24/7: Aging Services, Child & Youth Services, Counseling, Education/Training, Food/Shelter/Clothing, Health Services, Parenting, Substance Abuse, Support Groups, Volunteer Opportunities, & much more  Phone: 2-1-1 or 527-392-7049, Web: FELIZP BN047BTTC PVX, Email: Carmen@yahoo com    The National Suicide Prevention Lifeline, 2-594-629-TALK (9451), is a federally funded, 24-hour, toll-free suicide prevention service comprised of more than 120 individual crisis centers across the country  This service is available to anyone in suicidal crisis, emotional distress or those concerned about a friend or loved one  Https://suicidepreventionlifeline org/      Find more resources at Oncodesign    [facility] Line: 5-890.101.3304 ([place]) helps with transportation assistance to [facility] is offered through [address] [phone]

## 2021-07-14 NOTE — CASE MANAGEMENT
Pt is a 59yo  female admitted 7/13/21 2148 as a 201 from [facility] ED  CM met with pt in order to complete initial intake/assessment and pt presents as cooperative yet tearful  Pt reports 4 past IP psych admissions, last at [facility] 04/2020  Pt reports she resides at [address] [place] [address] with her  and plans to return there upon d/c  Pt reports family will transport home upon d/c  Pt signed OLENA for OP psychiatrist  [facility] [place] [phone]  Pt stated she would like to switch to another provider at the practice  CM contacted provider notified of admission and canceled pt's 7/19 appointment, asked what the protocol would be if pt wanted to switch providers at practice, they stated their 2 other providers are closed so if pt wanted to switch she would have to look at their [place] location  Pt stated she does not have a therapist currently but is open to a referral     Pt signed OLENA for PCP Dr Gricelda Guillory [phone]  CM contacted office and notified of admission  Pt signed OLENA for  Mayra Billy [phone], reports as primary support  CM contacted notified of admission and provided status update, pt stated he works until [employer] and was at work when this writer called, CM informed  that he can call the unit after 4pm for updates if needed and provided with unit number  Pt denies having a POA  [address] and CRISIS resources placed in AVS     Pt AUDIT 0, UDS-, PAWSS 0  Pt reports being sober for 25 years from alcohol  Pt reports one IP in 1996  Pt reports utilizing AA and finding it helpful that she goes "to meetings all the time and I have a sponsor that I talk to" and that she utilized GA as well  Pt reports being free from gambling since 12/2020 and that she was working with a gambling therapist for a few months  Transportation Pt drives    Pharmacy [facility]?  Y/N with who Yes, family    Access to firearms Pt denies    Supports , sister, and step daughter    Legal Pt denies    Education  Bachelors    Employed?  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

## 2021-07-14 NOTE — NURSING NOTE
Pt calm and cooperative, appears withdrawn and slightly tearful at times  Reports a lot of emotions arising today due to conversation with tx team  Pt reading book throughout shift, social and conversing with roommate  Pt reports 7/10 depression currently, denies SI  Reports having positive conversations with  who is supportive  Medication compliant, pt appears to sleep

## 2021-07-14 NOTE — TREATMENT PLAN
TREATMENT PLAN REVIEW - [address] 60 y o  1955 female MRN: [id number]    [address] Room / Bed: Ferrel Soulier 607/OABHU 456-78 Encounter: [id number]        Admit Date/Time:  7/13/2021  9:48 PM    Treatment Team: Attending Provider: Clarence Bedoya MD; Patient Care Assistant: Ahmed Lefort; Care Manager: Asmita Hudson RN; : Nikita Johnson; Occupational Therapy Assistant: Rhina Jaquez; Nurse Manager: Alvin Duenas RN; Registered Nurse: Roger Zambrano; Licensed Practical Nurse: Armando Marte LPN; Resident: Travon Morrow DO; Patient Care Assistant: David Hendricks; : Young De Souza; Patient Care Technician: Jaun Flood; Registered Nurse:  Ragini Cormier RN; Patient Care Assistant: Shelby Arzate    Diagnosis: Principal Problem:    Severe episode of recurrent major depressive disorder, without psychotic features ([facility] )  Active Problems:    Alcohol dependence in remission ([facility] )    Cervical myelopathy with cervical radiculopathy (HCC)    Orthostatic hypotension    Mixed dyslipidemia    Adult hypothyroidism    Gastroesophageal reflux disease    Neuropathy    Medical clearance for psychiatric admission    PTSD (post-traumatic stress disorder)      Patient Strengths/Assets: average or above intelligence, capable of independent living, cooperative, communication skills, compliant with medication, good support system, motivated, negotiates basic needs, resoureceful, stable housing, voluntary commitment status     Patient Barriers/Limitations: chronic pain, history of emotional trauma, lack of stable employment, low self esteem    Short Term Goals: Decreased depression, Decreased anxiety, Decrease in suicidal thoughts, Improved insight, Improved sleep, Improved appetite, increased socialization with peers on the unit    Long Term Goals: resolution of depressive symptoms, resolution of suicidal thoughts, acceptance of need for psychiatric treatment, acceptance of need for psychiatric follow up after discharge, acceptance of psychiatric diagnosis    Progress Towards Goals: start psychiatric medications as prescribed, discharge planning, attends groups, participates in milieu therapy    Recommended Treatment: medication management, patient medication education, group therapy, milieu therapy, supportive therapy, continued psychiatric evaluation    Treatment Frequency: daily medication monitoring, daily group and milieu therapy , monitoring through daily interdisciplinary rounds, monitoring medication levels as idicated    Estimated Discharge Date: 5-7 days    Discharge Plan: return to previous living arrangement, referrals as indicated, referral for outpatient medication management with a psychiatrist, referral for outpatient psychotherapy    Treatment Plan Created/Updated By: Becka Pires DO

## 2021-07-14 NOTE — PROGRESS NOTES
Pt attended short term problem solving group  Pt was anxious and tearful  Pt did note that she needed help as she is unable to function and manage current syptoms  Pt feeling overwhelmed  Pt did also note she has stressors and responsibilities with others  Pt reminded herself to work on own self care and mh recovery goals  07/14/21 1100   Activity/Group Checklist   Group Other (Comment)  (short term problem solving)   Attendance Attended   Attendance Duration (min) 31-45   Interactions Other (Comment)  (anxious)   Affect/Mood Other (Comment)  (shaky, visibly upset)   Goals Achieved Identified feelings; Discussed coping strategies; Able to listen to others; Able to engage in interactions

## 2021-07-15 LAB
ATRIAL RATE: 75 BPM
P AXIS: 59 DEGREES
PR INTERVAL: 164 MS
QRS AXIS: -18 DEGREES
QRSD INTERVAL: 82 MS
QT INTERVAL: 404 MS
QTC INTERVAL: 451 MS
T WAVE AXIS: 5 DEGREES
VENTRICULAR RATE: 75 BPM

## 2021-07-15 PROCEDURE — 99232 SBSQ HOSP IP/OBS MODERATE 35: CPT | Performed by: PSYCHIATRY & NEUROLOGY

## 2021-07-15 PROCEDURE — 93010 ELECTROCARDIOGRAM REPORT: CPT | Performed by: INTERNAL MEDICINE

## 2021-07-15 RX ADMIN — LEVOTHYROXINE SODIUM 50 MCG: 50 TABLET ORAL at 08:27

## 2021-07-15 RX ADMIN — GABAPENTIN 300 MG: 300 CAPSULE ORAL at 21:37

## 2021-07-15 RX ADMIN — PANTOPRAZOLE SODIUM 40 MG: 40 TABLET, DELAYED RELEASE ORAL at 08:29

## 2021-07-15 RX ADMIN — ATORVASTATIN CALCIUM 20 MG: 20 TABLET, FILM COATED ORAL at 08:28

## 2021-07-15 RX ADMIN — TRAZODONE HYDROCHLORIDE 100 MG: 100 TABLET ORAL at 21:36

## 2021-07-15 RX ADMIN — DULOXETINE 30 MG: 30 CAPSULE, DELAYED RELEASE ORAL at 08:29

## 2021-07-15 RX ADMIN — GABAPENTIN 300 MG: 300 CAPSULE ORAL at 08:28

## 2021-07-15 RX ADMIN — QUETIAPINE FUMARATE 100 MG: 100 TABLET ORAL at 21:37

## 2021-07-15 RX ADMIN — MIDODRINE HYDROCHLORIDE 10 MG: 5 TABLET ORAL at 08:28

## 2021-07-15 RX ADMIN — GABAPENTIN 300 MG: 300 CAPSULE ORAL at 16:59

## 2021-07-15 NOTE — NURSING NOTE
Pt was successfully able to access personal email on ipad  Good intake at meal  Pt appears calm, no distress noted  Reading book in dayroom  Pt napped in evening   No symptoms reported, pt reports improvement in mood and "doing well "

## 2021-07-15 NOTE — PROGRESS NOTES
Progress Note - Behavioral Health   Ren Luque 60 y o  female MRN: [id number]  Unit/Bed#: Mellissa Krause 190-98 Encounter: [id number]    Assessment/Plan   Principal Problem:    Severe episode of recurrent major depressive disorder, without psychotic features ([facility] )  Active Problems:    Alcohol dependence in remission ([facility] )    Cervical myelopathy with cervical radiculopathy (HCC)    Orthostatic hypotension    Mixed dyslipidemia    Adult hypothyroidism    Gastroesophageal reflux disease    Neuropathy    Medical clearance for psychiatric admission    PTSD (post-traumatic stress disorder)    Assessment  Barbara kim 60 y  o  female, , living with , bachelor's education, on SSD, with psychiatric history of recurrent MDD , GED, PTSD, significant trauma, past alcohol use disorder (sober for 25 years, attends AA regularly), past gambling disorder, and medical history of GERD, dyslipidemia, dysphagia, bilateral leg weakness, syncope due to orthostatic hypotension, epigastric abdominal pain, hypothyroidism, neuropathy, and cervical myelopathy with cervical radiculopathy  Patient arrived at [facility] ED after speaking to the suicidal hotline on 07/13 due to a weekend of depression, suicidal thoughts and plan to overdose on trazodone  Patient reports the trigger for this depressive episode was 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[place] [address]  Patient was admitted to [facility] 07/14 voluntarily via 201  Plan/Recommended Treatment  Continue Cymbalta 30 mg q d , p o for depression/anxiety, plan to increase dose to 60 mg  (patient was on 60 mg b i d  in past)  Continue Seroquel 100 mg q d p o for sleep  Continue Trazodone 100 mg q d p o for sleep      Continue medication management per SLIM  Start group therapy, milieu therapy and occupational therapy  Start frequent safety checks and vitals per unit protocol  Start assessing for medication side effects    Continue medication management per SLIM  Case discussed with treatment team   Check admission labs  Collaborate with family for baseline assessment and disposition planning      Treatment options and alternatives were reviewed with the patient, who concurs with the above plan  Risks, benefits, and possible side effects of medications were explained to the patient, and she verbalizes understanding      ------------------------------------------------------------    Subjective:    Per nursing report, Constanza Braden has been cooperative on the unit, compliant with medications, and attending groups  Patient was reported to be tearful yesterday after an interaction       Patient was examined in private room  Patient reported 6/10 depression and 5/10 anxiety  When asked, patient reported this is better relative to yesterday because "she is more accepting of her need to be here, not feeling guilt " Patient described mood as "complacent" and that she "is just following directions here " When asked, patient reported this makes her feel uncomfortable because she likes to be in control  Patient was counseled about her history of changing medications on her own and lack of compliance  Patient was counseled that she is an important part of this treatment team but neither she nor we are in charge of the steering wheel alone  Patient was able to understand this abstract thinking and agreed to not make changes to her medications in the future without consulting IP/OP clinical staff  Patient denied any AVH or SI/HI  Patient reported "withdrawal from Gabapentin two weeks ago after neurologist told her not to take it if she did not have pain " Patient reported this incident started "the downward spiral that lead her here " Patient reported symptoms of "fever, stomach upset, inability to sleep" which she "confirmed were the exact symptoms of Gabapentin withdrawal "     For the entire interview, patient was very visibly slow in her movements and slurred in her speech  Patient had an episode of thought blocking, where she stopped abruptly mid-sentence and stared off into the distance for 1 minute  Patient was observed during this interaction and did not have any noticeable muscle fasciculations, jerking, or twitching  Patient did not have any noticeable facial drooping or changes in speech  Patient denied any visual, olfactory, or auditory aura  Patient reported being very tired and slight confusion after this interaction, but was still alert and oriented  Patient did report: "I was told I had a shaking seizure after puberty but nothing since," "2 weeks ago in the garden where she was unable to remember certain words for 5 minutes," and "I hit my head on the fuse box a couple of weeks ago and could not remember the word for it " Patient was able to walk to her room  Patient's vitals were taken by nursing staff to ensure no underlying issue: SpO2 96%, pulse 67, /78, temperature 97 3  Patient said she was feeling anxious and may have froze up earlier  Progress Toward Goals: No improvement    Psychiatric Review of Systems:  Behavior over the last 24 hours: unchanged  Sleep: "good", patient reported some issue falling asleep but read a book for an hour   Patient denied any issues staying asleep  Appetite: adequate, patient reported eating all lunch yesterday but not hungry that early in the morning for breakfast  Medication side effects/ROS: Fatigue    Vital signs in last 24 hours:  Temp:  [96 1 °F (35 6 °C)-98 °F (36 7 °C)] 97 8 °F (36 6 °C)  HR:  [] 100  Resp:  [18-19] 19  BP: (105-140)/(63-86) 105/63    Vitals:    07/14/21 0702 07/14/21 1442 07/14/21 2027 07/15/21 0645   BP: 116/68 119/71 140/86 105/63   BP Location: Left arm Right arm Right arm Left arm   Pulse: 79 73 69 100   Resp: 19 19 18 19   Temp: 97 7 °F (36 5 °C) 98 °F (36 7 °C) (!) 96 1 °F (35 6 °C) 97 8 °F (36 6 °C)   TempSrc: Temporal Skin Temporal Temporal   SpO2: 92% 98% 99% 98%   Weight:       Height: Mental Status Exam:  Appearance:  alert, good eye contact, appears stated age, casually dressed with pink pajamas and sweater, appropriate grooming and hygiene, overweight and disheveled hair   Behavior:  calm, cooperative and sitting comfortably   Motor: psychomotor retardation and normal gait and balance   Speech:  spontaneous, slow, soft, coherent and slurred   Mood:  "complacent"   Affect:  constricted, depressed and anxious   Thought Process:  organized, logical   Thought Content: no verbalized delusions or overt paranoia   Perceptual disturbances: no reported hallucinations and does not appear to be responding to internal stimuli at this time   Risk Potential: No active or passive suicidal or homicidal ideation was verbalized during interview, Low potential for aggression based on previous behavior   Cognition: oriented to person, place, time, and situation, appears to be of average intelligence, normal abstract reasoning, age-appropriate attention span and concentration and cognition not formally tested   Insight:  Limited   Judgment: Limited     Current Medications:  Current Facility-Administered Medications   Medication Dose Route Frequency Provider Last Rate    acetaminophen  650 mg Oral Q4H PRN Abigail Rocks, CRNP      acetaminophen  650 mg Oral Q4H PRN Abigail Rocks, CRNP      acetaminophen  975 mg Oral Q6H PRN Abigail Rocks, CRNP      albuterol  2 puff Inhalation Q4H PRN Josetet Ruelas PA-C      aluminum-magnesium hydroxide-simethicone  30 mL Oral Q4H PRN Abigail Rocks, CRNP      atorvastatin  20 mg Oral Daily Josette Ruelas PA-C      baclofen  10 mg Oral TID PRN Ian Hoffmann PA-C      benztropine  1 mg Intramuscular Q4H PRN Max 6/day Abigail Rocks, CRNP      benztropine  0 5 mg Oral Q4H PRN Max 6/day Abigail Rocks, CRNP      DULoxetine  30 mg Oral Daily Jorge Li, DO      gabapentin  300 mg Oral TID Chip JEREMÍAS HoffmannC      levothyroxine  50 mcg Oral Daily Josette Ruelas PA-C      LORazepam  1 mg Intramuscular Q6H PRN Max 3/day Jearldine Reeve, CRNP      LORazepam  0 5 mg Oral Q6H PRN Max 4/day Jearldine Reeve, CRCHINEDU      LORazepam  1 mg Oral Q6H PRN Max 3/day Jearldine Reeve, CRCHINEDU      midodrine  10 mg Oral TID Gina Tapia PA-C      mirtazapine  7 5 mg Oral HS PRN Jearldine Reeve, CRCHINEDU      nicotine polacrilex  2 mg Oral Q2H PRN Jearldine Reeve, CRNP      OLANZapine  5 mg Intramuscular Q3H PRN Max 3/day Jearldine Reeve, CRNP      OLANZapine  5 mg Oral Q3H PRN Max 3/day Jearldine Reeve, SHRAVAN      pantoprazole  40 mg Oral Daily Josette Ruelas PA-C      QUEtiapine  100 mg Oral HS Kye Painting MD      senna-docusate sodium  1 tablet Oral Daily PRN Sandyldine Kavon, SHRAVAN      traZODone  100 mg Oral HS Josette Ruelas PA-C         Behavioral Health Medications: all current active meds have been reviewed  Changes as in plan section above  Laboratory results:  I have personally reviewed all pertinent laboratory/tests results    Recent Results (from the past 48 hour(s))   ECG 12 lead    Collection Time: 07/13/21  1:12 PM   Result Value Ref Range    Ventricular Rate 80 BPM    Atrial Rate 80 BPM    ND Interval 166 ms    QRSD Interval 82 ms    QT Interval 398 ms    QTC Interval 459 ms    P Axis 53 degrees    QRS Axis -17 degrees    T Wave Axis 8 degrees   Novel Coronavirus (Covid-19),PCR [facility] - 2 Hour Stat    Collection Time: 07/13/21  1:30 PM    Specimen: Nose; Nares   Result Value Ref Range    SARS-CoV-2 Negative Negative   CBC and differential    Collection Time: 07/13/21  1:31 PM   Result Value Ref Range    WBC 6 64 4 31 - 10 16 Thousand/uL    RBC 4 46 3 81 - 5 12 Million/uL    Hemoglobin 12 0 11 5 - 15 4 g/dL    Hematocrit 38 2 34 8 - 46 1 %    MCV 86 82 - 98 fL    MCH 26 9 26 8 - 34 3 pg    MCHC 31 4 31 4 - 37 4 g/dL    RDW 13 2 11 6 - 15 1 %    MPV 10 5 8 9 - 12 7 fL    Platelets 124 346 - 808 Thousands/uL    nRBC 0 /100 WBCs    Neutrophils Relative 61 43 - 75 %    Immat GRANS % 0 0 - 2 %    Lymphocytes Relative 22 14 - 44 %    Monocytes Relative 11 4 - 12 %    Eosinophils Relative 5 0 - 6 %    Basophils Relative 1 0 - 1 %    Neutrophils Absolute 4 11 1 85 - 7 62 Thousands/µL    Immature Grans Absolute 0 01 0 00 - 0 20 Thousand/uL    Lymphocytes Absolute 1 43 0 60 - 4 47 Thousands/µL    Monocytes Absolute 0 71 0 17 - 1 22 Thousand/µL    Eosinophils Absolute 0 34 0 00 - 0 61 Thousand/µL    Basophils Absolute 0 04 0 00 - 0 10 Thousands/µL   Comprehensive metabolic panel    Collection Time: 07/13/21  1:31 PM   Result Value Ref Range    Sodium 144 136 - 145 mmol/L    Potassium 4 8 3 5 - 5 3 mmol/L    Chloride 107 100 - 108 mmol/L    CO2 29 21 - 32 mmol/L    ANION GAP 8 4 - 13 mmol/L    BUN 12 5 - 25 mg/dL    Creatinine 0 86 0 60 - 1 30 mg/dL    Glucose 97 65 - 140 mg/dL    Calcium 8 8 8 3 - 10 1 mg/dL    AST 17 5 - 45 U/L    ALT 30 12 - 78 U/L    Alkaline Phosphatase 79 46 - 116 U/L    Total Protein 6 5 6 4 - 8 2 g/dL    Albumin 3 5 3 5 - 5 0 g/dL    Total Bilirubin 0 26 0 20 - 1 00 mg/dL    eGFR 72 ml/min/1 73sq m   TSH, 3rd generation with Free T4 reflex    Collection Time: 07/13/21  1:31 PM   Result Value Ref Range    TSH 3RD GENERATON 1 852 0 358 - 3 740 uIU/mL   Ethanol    Collection Time: 07/13/21  1:31 PM   Result Value Ref Range    Ethanol Lvl <3 0 - 3 mg/dL   Salicylate level    Collection Time: 07/13/21  1:31 PM   Result Value Ref Range    Salicylate Lvl <3 (L) 3 - 20 mg/dL   Acetaminophen level-If concentration is detectable, please discuss with medical  on call      Collection Time: 07/13/21  1:31 PM   Result Value Ref Range    Acetaminophen Level <2 (L) 10 - 20 ug/mL   Rapid drug screen, urine    Collection Time: 07/13/21  1:55 PM   Result Value Ref Range    Amph/Meth UR Negative Negative    Barbiturate Ur Negative Negative    Benzodiazepine Urine Negative Negative    Cocaine Urine Negative Negative    Methadone Urine Negative Negative Opiate Urine Negative Negative    PCP Ur Negative Negative    THC Urine Negative Negative    Oxycodone Urine Negative Negative   UA (URINE) with reflex to Scope    Collection Time: 07/13/21  1:55 PM   Result Value Ref Range    Color, UA Colorless     Clarity, UA Clear     Specific Gravity, UA <=1 005 1 000 - 1 030    pH, UA 7 0 5 0, 5 5, 6 0, 6 5, 7 0, 7 5, 8 0, 8 5, 9 0    Leukocytes, UA Trace (A) Negative    Nitrite, UA Negative Negative    Protein, UA Negative Negative mg/dl    Glucose, UA Negative Negative mg/dl    Ketones, UA Negative Negative mg/dl    Urobilinogen, UA 0 2 0 2, 1 0 E U /dl E U /dl    Bilirubin, UA Negative Negative    Blood, UA Negative Negative   Urine Microscopic    Collection Time: 07/13/21  1:55 PM   Result Value Ref Range    RBC, UA 0-1 None Seen, 0-1, 1-2, 2-4, 0-5 /hpf    WBC, UA 0-1 None Seen, 0-1, 1-2, 0-5, 2-4 /hpf    Epithelial Cells Occasional None Seen, Occasional /hpf    Bacteria, UA Occasional None Seen, Occasional /hpf   ECG 12 lead    Collection Time: 07/14/21  6:39 AM   Result Value Ref Range    Ventricular Rate 75 BPM    Atrial Rate 75 BPM    DE Interval 164 ms    QRSD Interval 82 ms    QT Interval 404 ms    QTC Interval 451 ms    P Banks 59 degrees    QRS Axis -18 degrees    T Wave Axis 5 degrees   ECG 12 lead    Collection Time: 07/14/21  6:39 AM   Result Value Ref Range    Ventricular Rate 75 BPM    Atrial Rate 75 BPM    DE Interval 164 ms    QRSD Interval 82 ms    QT Interval 404 ms    QTC Interval 451 ms    P Banks 59 degrees    QRS Axis -18 degrees    T Wave Axis 5 degrees        Margretta Crumbly, DO

## 2021-07-15 NOTE — PROGRESS NOTES
07/15/21 1330   Activity/Group Checklist   Group Other (Comment)  (commuication and reminscence)   Attendance Other (Comment)  (arrived last 5 minutes)

## 2021-07-15 NOTE — NURSING NOTE
Pt reported this am "not feeling great"  Rated depression 7/10 and anxiety 3/4  Also reported difficulty falling asleep, then being able to sleep  Notified by staff that pt was feeling dizzy  VS 70-/78  Spoke to pt and she was focused on "not being able to get to email"  Tearful  Stated, "I have to look at 3 important emails  One if from my [address]  I can't believe this is happening to me"  Attempted to assist pt but without phone and ability to receive verification code, could not assist her  She stated that she would call her  later today and see if he could read the code to her as she does not have her phone  Appeared calmer after interaction  Appetite 100% for breakfast  Will continue to monitor

## 2021-07-15 NOTE — PROGRESS NOTES
Pt able to complete an effective relapse prevention plan and engaged in nurturing discussion when prompted  07/15/21 1000   Activity/Group Checklist   Group Life Skills  (completed relapse prevention plan)   Attendance Attended   Attendance Duration (min) 31-45   Interactions Interacted appropriately   Affect/Mood Appropriate;Normal range   Goals Achieved Able to engage in interactions; Able to listen to others; Identified relapse prevention strategies; Discussed coping strategies; Identified resources and support systems; Able to reflect/comment on own behavior;Able to self-disclose

## 2021-07-15 NOTE — PROGRESS NOTES
07/15/21 1128   Team Meeting   Meeting Type Tx Team Meeting   Initial Conference Date 07/15/21   Team Members Present   Team Members Present Physician;Nurse;   Physician Team Member Dr Robel Caballero Team Member [facility] Management Team Member Yanni   Patient/Family Present   Patient Present Yes   Patient's Family Present No     Treatment goals include: decreasing depression, remaining safe on unit, building effective coping skills, discharge planning

## 2021-07-15 NOTE — CASE MANAGEMENT
07/15/21 0841   Team Meeting   Meeting Type Daily Rounds   Initial Conference Date 07/15/21   Team Members Present   Team Members Present Physician;Nurse;;; Occupational Therapist   Physician Team Member Dr Steve Alan; Dr Arleth Marte; [address]  Team Member [facility] Management Team Member Tonia Rubalcava   Social Work Team Member Puja   OT Team Member Abel Barker   Patient/Family Present   Patient Present No   Patient's Family Present No     LCD = 7/15, upset about being here, had a good phone call with  yesterday, 10/10 depression, 4/4 anxiety, slept, med compliant, received PRN Tylenol yesterday

## 2021-07-15 NOTE — CASE MANAGEMENT
Pt reports that she needs to access her email asap in order to check correspondence related to refinancing her mortgage, applying for a bank loan, and checking the status of a potential job interview  Pt states that her cell phone is not at the hospital but she will call her  to see if he can drop it off today  Pt reports that the email communication is not something that her  can take care of while she's hospitalized  Additionally, pt would like to be d/c'ed prior to 7/20 because she has an important obligation that night that she cannot miss  She reports that she will be the [employer] for [employer] for the upcoming year and this event is the first opportunity to meet the other governing members

## 2021-07-16 PROCEDURE — 99232 SBSQ HOSP IP/OBS MODERATE 35: CPT | Performed by: PSYCHIATRY & NEUROLOGY

## 2021-07-16 RX ORDER — DULOXETIN HYDROCHLORIDE 60 MG/1
60 CAPSULE, DELAYED RELEASE ORAL DAILY
Status: DISCONTINUED | OUTPATIENT
Start: 2021-07-17 | End: 2021-07-19

## 2021-07-16 RX ADMIN — QUETIAPINE FUMARATE 100 MG: 100 TABLET ORAL at 21:14

## 2021-07-16 RX ADMIN — TRAZODONE HYDROCHLORIDE 100 MG: 100 TABLET ORAL at 21:14

## 2021-07-16 RX ADMIN — ACETAMINOPHEN 650 MG: 325 TABLET ORAL at 16:22

## 2021-07-16 RX ADMIN — GABAPENTIN 300 MG: 300 CAPSULE ORAL at 08:27

## 2021-07-16 RX ADMIN — DULOXETINE 30 MG: 30 CAPSULE, DELAYED RELEASE ORAL at 08:27

## 2021-07-16 RX ADMIN — MIDODRINE HYDROCHLORIDE 10 MG: 5 TABLET ORAL at 08:25

## 2021-07-16 RX ADMIN — GABAPENTIN 300 MG: 300 CAPSULE ORAL at 21:14

## 2021-07-16 RX ADMIN — GABAPENTIN 300 MG: 300 CAPSULE ORAL at 16:23

## 2021-07-16 RX ADMIN — LEVOTHYROXINE SODIUM 50 MCG: 50 TABLET ORAL at 06:17

## 2021-07-16 RX ADMIN — PANTOPRAZOLE SODIUM 40 MG: 40 TABLET, DELAYED RELEASE ORAL at 06:17

## 2021-07-16 RX ADMIN — ATORVASTATIN CALCIUM 20 MG: 20 TABLET, FILM COATED ORAL at 08:26

## 2021-07-16 NOTE — PROGRESS NOTES
Progress Note - Behavioral Health   Namita Meyer 60 y o  female MRN: [id number]  Unit/Bed#: Luis Oneil 271-52 Encounter: [id number]    Assessment/Plan   Principal Problem:    Severe episode of recurrent major depressive disorder, without psychotic features ([facility] )  Active Problems:    Alcohol dependence in remission ([facility] )    Cervical myelopathy with cervical radiculopathy (HCC)    Orthostatic hypotension    Mixed dyslipidemia    Adult hypothyroidism    Gastroesophageal reflux disease    Neuropathy    Medical clearance for psychiatric admission    PTSD (post-traumatic stress disorder)      Recommended Treatment:   Increase Cymbalta to 60 mg daily, for depression/anxiety, plan to increase dose to 90 mg on 07/19/2021 and plan for discharge Tuesday 07/20/2021  (patient on 60 mg b i d  in past)  Continue Seroquel 100 mg q d p o for sleep  Continue Trazodone 100 mg q d p o for sleep  Continue with group therapy, milieu therapy and occupational therapy  Continue frequent safety checks and vitals per unit protocol  Case discussed with treatment team   Risks, benefits and possible side effects of Medications: Risks, benefits, and possible side effects of medications have been explained to the patient, who verbalizes understanding    ------------------------------------------------------------    Subjective: Per nursing report, Kenyon Stevenson has been cooperative on the unit and compliant with medications  Today, Kenyon Stevenson is consenting for safety on the unit  She reports feeling good but stating she has 4/10 depression 2 out of for anxiety  Patient stated that she overslept yesterday roughly 10 hours with 2 naps during the day, and appetite more than baseline  Patient stated that she has been going to groups and enjoying them with her peers, and working on improving herself  Patient stated that she is not as hopeless anymore " Patient was also appreciative of being able to use an iPad for her emails    Patient was told to prioritize her tasks which she seems to be a little anxious about, signing up for fewer things that she needs to do outside of the hospital     Progress Toward Goals: "Definitely feeling positive that my symptoms lessened "    Psychiatric Review of Systems:  Behavior over the last 24 hours: improved  Sleep: hypersomnia  Appetite: increased from baseline noted that this has happened before while on Cymbalta  Medication side effects: none verbalized  ROS: Complete review of systems is negative except as noted above      Vital signs in last 24 hours:  Temp:  [97 °F (36 1 °C)-97 8 °F (36 6 °C)] 97 8 °F (36 6 °C)  HR:  [64-71] 71  Resp:  [16-19] 16  BP: (115-127)/(60-72) 115/60    Mental Status Exam:  Appearance:  alert, good eye contact, appears stated age, casually dressed, appropriate grooming and hygiene, overweight and smiling   Behavior:  calm, cooperative and laying in bed   Motor: no abnormal movements and normal gait and balance   Speech:  spontaneous, normal rate, normal volume and coherent   Mood:  "Good"   Affect:  brighter than previous days   Thought Process:  organized, logical, goal directed, normal rate of thoughts   Thought Content: no verbalized delusions or overt paranoia   Perceptual disturbances: no reported hallucinations and does not appear to be responding to internal stimuli at this time   Risk Potential: No active or passive suicidal or homicidal ideation was verbalized during interview, Low potential for aggression based on previous behavior   Cognition: oriented to person, place, time, and situation, memory grossly intact, appears to be of average intelligence, age-appropriate attention span and concentration and cognition not formally tested   Insight:  Improving   Judgment: Improving     Current Medications:  Current Facility-Administered Medications   Medication Dose Route Frequency Provider Last Rate    acetaminophen  650 mg Oral Q4H PRN SHRAVAN Quan      acetaminophen  650 mg Oral Q4H PRN Waunita Ni, CRNP      acetaminophen  975 mg Oral Q6H PRN Waunita Ni, CRNP      albuterol  2 puff Inhalation Q4H PRN Josette Ruelas PA-C      aluminum-magnesium hydroxide-simethicone  30 mL Oral Q4H PRN Waunita Ni, CRNP      atorvastatin  20 mg Oral Daily Josette Ruelas, KALEB      baclofen  10 mg Oral TID PRN Skip Kail, KALEB      benztropine  1 mg Intramuscular Q4H PRN Max 6/day Waunita Ni, CRNP      benztropine  0 5 mg Oral Q4H PRN Max 6/day Waunita Ni, CRNP      DULoxetine  30 mg Oral Daily Jorge Li,       gabapentin  300 mg Oral TID Skip Kail, KALEB      levothyroxine  50 mcg Oral Daily Josette Ruelas PA-C      LORazepam  1 mg Intramuscular Q6H PRN Max 3/day Waunita Ni, CRNP      LORazepam  0 5 mg Oral Q6H PRN Max 4/day Waunita Ni, CRNP      LORazepam  1 mg Oral Q6H PRN Max 3/day Waunita Ni, CRNP      midodrine  10 mg Oral TID Josette Ruelas PA-C      mirtazapine  7 5 mg Oral HS PRN Waunita Ni, CRNP      nicotine polacrilex  2 mg Oral Q2H PRN Waunita Ni, CRNP      OLANZapine  5 mg Intramuscular Q3H PRN Max 3/day Waunita Ni, CRNP      OLANZapine  5 mg Oral Q3H PRN Max 3/day Waunita Ni, CRNP      pantoprazole  40 mg Oral Daily Josette Ruelas PA-C      QUEtiapine  100 mg Oral HS Jethro Jasso MD      senna-docusate sodium  1 tablet Oral Daily PRN Waunita Ni, CRNP      traZODone  100 mg Oral HS Josette Ruelas PA-C         Behavioral Health Medications: all current active meds have been reviewed  Changes as in plan section above  Laboratory results:  I have personally reviewed all pertinent laboratory/tests results  No results found for this or any previous visit (from the past 48 hour(s))       Travis Stevenson DO

## 2021-07-16 NOTE — PROGRESS NOTES
[facility]  Pt making slow and steady progress towards mh rocovery goals  Pt noted that medication compliance was needed in her mh recovery  Pt also spoke about working on open communication with family  Pt was able to express gratitude and had hope in recovery  07/16/21 1100   Activity/Group Checklist   Group Other (Comment)  (MH education)   Attendance Attended   Attendance Duration (min) 31-45   Interactions Interacted appropriately   Affect/Mood Appropriate   Goals Achieved Identified feelings; Identified relapse prevention strategies; Able to listen to others; Able to engage in interactions

## 2021-07-16 NOTE — NURSING NOTE
Pt alert and visible on the unit at intervals  Reported depression 6/10 and anxiety 2/4  Denies si  Remains preoccupied with finances and refinancing her home  Reported that she did not get the phone number for the [employer] and is concerned that she may not be able to refinance her home if she goes beyond the deadline  Informed that she can call her [facility]sb when he comes home from work to see if she can take care of this matter  Otherwise she can get the number over the weekend and call on Monday  Affect is blunted  No episodes of dizziness although she reported that she has experienced hypotension in the past  Appetite 100% for meals  Will continue to monitor and maintain q 7 min checks

## 2021-07-16 NOTE — CASE MANAGEMENT
07/16/21 0841   Team Meeting   Meeting Type Daily Rounds   Initial Conference Date 07/16/21   Team Members Present   Team Members Present Physician;Nurse;;; Occupational Therapist   Physician Team Member Dr Stephen Rodriguez; 50 Lavinia Reid   Nursing Team Member [facility] Management Team Member Lacey Peacokc   Social Work Team Member Puja   OT Team Member Stephanie Pritchard   Patient/Family Present   Patient Present No   Patient's Family Present No     LCD = 7/19, 3/4 anxiety, 9/10 depression, tearful, checked email last night, denies s/s, med compliant, slept

## 2021-07-17 PROCEDURE — 99232 SBSQ HOSP IP/OBS MODERATE 35: CPT | Performed by: NURSE PRACTITIONER

## 2021-07-17 RX ORDER — IBUPROFEN 400 MG/1
400 TABLET ORAL EVERY 6 HOURS PRN
Status: DISCONTINUED | OUTPATIENT
Start: 2021-07-17 | End: 2021-07-20 | Stop reason: HOSPADM

## 2021-07-17 RX ADMIN — GABAPENTIN 300 MG: 300 CAPSULE ORAL at 15:54

## 2021-07-17 RX ADMIN — ACETAMINOPHEN 975 MG: 325 TABLET ORAL at 22:06

## 2021-07-17 RX ADMIN — ACETAMINOPHEN 975 MG: 325 TABLET ORAL at 05:22

## 2021-07-17 RX ADMIN — GABAPENTIN 300 MG: 300 CAPSULE ORAL at 22:08

## 2021-07-17 RX ADMIN — IBUPROFEN 400 MG: 400 TABLET ORAL at 13:28

## 2021-07-17 RX ADMIN — ATORVASTATIN CALCIUM 20 MG: 20 TABLET, FILM COATED ORAL at 08:44

## 2021-07-17 RX ADMIN — MIDODRINE HYDROCHLORIDE 10 MG: 5 TABLET ORAL at 15:54

## 2021-07-17 RX ADMIN — ACETAMINOPHEN 650 MG: 325 TABLET ORAL at 11:07

## 2021-07-17 RX ADMIN — GABAPENTIN 300 MG: 300 CAPSULE ORAL at 08:44

## 2021-07-17 RX ADMIN — LEVOTHYROXINE SODIUM 50 MCG: 50 TABLET ORAL at 05:24

## 2021-07-17 RX ADMIN — DULOXETINE 60 MG: 60 CAPSULE, DELAYED RELEASE ORAL at 08:44

## 2021-07-17 RX ADMIN — MIDODRINE HYDROCHLORIDE 10 MG: 5 TABLET ORAL at 22:08

## 2021-07-17 RX ADMIN — QUETIAPINE FUMARATE 100 MG: 100 TABLET ORAL at 22:09

## 2021-07-17 RX ADMIN — TRAZODONE HYDROCHLORIDE 100 MG: 100 TABLET ORAL at 22:07

## 2021-07-17 RX ADMIN — MIDODRINE HYDROCHLORIDE 10 MG: 5 TABLET ORAL at 08:44

## 2021-07-17 RX ADMIN — PANTOPRAZOLE SODIUM 40 MG: 40 TABLET, DELAYED RELEASE ORAL at 06:54

## 2021-07-17 RX ADMIN — ACETAMINOPHEN 975 MG: 325 TABLET ORAL at 15:53

## 2021-07-17 NOTE — NURSING NOTE
Pt calm and cooperative during shift, tylenol administered for tooth pain, effective  Pt reports depression 4/10, no anxiety at current time  Denies SI  Medication compliant  Appears to sleep

## 2021-07-17 NOTE — NURSING NOTE
Patient is calm and cooperative upon approach  Isolative to room and quiet room often during day reading  States her mood has "improved since this morning"  She was "sad about being here and wants to be home participating in Saturday activities like gardening"  But is also "happy to be getting help"  Rates depression 4/10, denies anxiety/SI/HI  Patient has experienced tooth pain throughout today  Rated 5/10, received tylenol  Upon reassessment tooth pain then rated 9/10 motrin given at 1330  At 1600 tylenol given again- partially effective  Medication compliant

## 2021-07-17 NOTE — PROGRESS NOTES
Progress Note - Behavioral Health   Hill Anil 60 y o  female MRN: [id number]  Unit/Bed#: Nisa Kate 384-52 Encounter: [id number]    The patient was seen for continuing care and reviewed with treatment team   Staff notes patient has been calm and cooperative  She has been preoccupied with tooth pain and frequently requesting p r n  Tylenol  On approach she says she feels more depressed today than she was the last few days  States she stayed in bed a lot yesterday sleeping and that she slept from after lunch until dinnertime  She then slept through the night  She rates her current depression as "back to 7/10" with 10 being the worst   She then became tearful stating she is upset "that I'm like this" "I just want to be normal"  Education provided and she was receptive  She said she is feeling angry with herself because she finished up with therapy in April and then stopped taking her Cymbalta when she could not get a refill  She felt that she was doing fine without both and then began to relapse  She said Corwith Lev was a stupid thing to do"  Appetite is decreased  Denies SI      Severe episode of recurrent major depressive disorder, without psychotic features ([facility] )    Vital signs in last 24 hours:  Temp:  [97 5 °F (36 4 °C)-97 7 °F (36 5 °C)] 97 7 °F (36 5 °C)  HR:  [69-76] 74  Resp:  [14-16] 14  BP: (110-129)/(61-67) 113/62    Mental Status Evaluation:    Appearance Adequate hygiene and grooming   Behavior cooperative   Mood depressed   Speech Normal rate and volume   Affect Tearful   Thought Processes Goal directed and coherent   Thought Content Does not verbalize delusional material   Perceptual Disturbances Denies hallucinations and does not appear to be responding to internal stimuli   Risk Potential Suicidal/Homicidal Ideation - No evidence of suicidal or homicidal ideation and patient does not verbalize suicidal or homicidal ideation  Risk of Violence - No evidence of risk for violence found on assessment  Risk of Self Mutilation - No evidence of risk for self mutilation found on assessment   Sensorium oriented to person, place and time/date   Cognition/Memory recent and remote memory grossly intact   Consciousness alert and awake   Attention/Concentration attention span and concentration are age appropriate   Insight intact   Judgement limited   Muscle Strength and Gait/Station normal muscle strength and normal muscle tone, normal gait/station and normal balance   Motor Activity no abnormal movements       Progress Toward Goals:  No change      Recommended Treatment: Continue with pharmacotherapy, group therapy, milieu therapy and occupational therapy    The patient will be maintained on the following medications:  Current Facility-Administered Medications   Medication Dose Route Frequency Provider Last Rate    acetaminophen  650 mg Oral Q4H PRN Jearldine Reeve, CRNP      acetaminophen  650 mg Oral Q4H PRN Jearldine Reeve, CRNP      acetaminophen  975 mg Oral Q6H PRN Jearldine Reeve, CRNP      albuterol  2 puff Inhalation Q4H PRN Josette Ruelas PA-C      aluminum-magnesium hydroxide-simethicone  30 mL Oral Q4H PRN Jearldine Reeve, CRNP      atorvastatin  20 mg Oral Daily Josette Ruelas PA-C      baclofen  10 mg Oral TID PRN Thedore FittingKALEB      benztropine  1 mg Intramuscular Q4H PRN Max 6/day Jearldine Reeve, CRNP      benztropine  0 5 mg Oral Q4H PRN Max 6/day Jearldine Reeve, CRNP      DULoxetine  60 mg Oral Daily Baldemar Mcnair DO      gabapentin  300 mg Oral TID Thedore Fitting, KALEB      ibuprofen  400 mg Oral Q6H PRN Jearldine Ernave, CRNP      levothyroxine  50 mcg Oral Daily Josette Ruelas PA-C      LORazepam  1 mg Intramuscular Q6H PRN Max 3/day Jearldine Reeve, CRNP      LORazepam  0 5 mg Oral Q6H PRN Max 4/day Jearldine Reeve, CRNP      LORazepam  1 mg Oral Q6H PRN Max 3/day Jearldine Reeve, CRNP      midodrine  10 mg Oral TID Josette Ruelas PA-C      mirtazapine  7 5 mg Oral HS PRN Willye Parents, CRNP      nicotine polacrilex  2 mg Oral Q2H PRN Willye Parents, CRNP      OLANZapine  5 mg Intramuscular Q3H PRN Max 3/day Willye Parents, CRNP      OLANZapine  5 mg Oral Q3H PRN Max 3/day Willye Parents, CRNP      pantoprazole  40 mg Oral Daily Josette Ruelas PA-C      QUEtiapine  100 mg Oral HS MD Seth Pitt-docusate sodium  1 tablet Oral Daily PRN Willye Parents, SHRAVAN      traZODone  100 mg Oral HS Josette Ruelas PA-C         Severe episode of recurrent major depressive disorder, without psychotic features ([facility] )

## 2021-07-17 NOTE — PLAN OF CARE
Problem: Ineffective Coping  Goal: Identifies ineffective coping skills  Outcome: Progressing  Goal: Identifies healthy coping skills  Outcome: Progressing  Goal: Demonstrates healthy coping skills  Outcome: Progressing  Goal: Patient/Family participate in treatment and DC plans  Description: Interventions:  - Provide therapeutic environment  Outcome: Progressing  Goal: Patient/Family verbalizes awareness of resources  Outcome: Progressing  Goal: Understands least restrictive measures  Description: Interventions:  - Utilize least restrictive behavior  Outcome: Progressing  Goal: Free from restraint events  Description: - Utilize least restrictive measures   - Provide behavioral interventions   - Redirect inappropriate behaviors   Outcome: Progressing     Problem: Risk for Self Injury/Neglect  Goal: Treatment Goal: Remain safe during length of stay, learn and adopt new coping skills, and be free of self-injurious ideation, impulses and acts at the time of discharge  Outcome: Progressing  Goal: Verbalize thoughts and feelings  Description: Interventions:  - Assess and re-assess patient's lethality and potential for self-injury  - Engage patient in 1:1 interactions, daily, for a minimum of 15 minutes  - Encourage patient to express feelings, fears, frustrations, hopes  - Establish rapport/trust with patient   Outcome: Progressing  Goal: Refrain from harming self  Description: Interventions:  - Monitor patient closely, per order  - Develop a trusting relationship  - Supervise medication ingestion, monitor effects and side effects   Outcome: Progressing  Goal: Recognize maladaptive responses and adopt new coping mechanisms  Outcome: Progressing  Goal: Complete daily ADLs, including personal hygiene independently, as able  Description: Interventions:  - Observe, teach, and assist patient with ADLS  - Monitor and promote a balance of rest/activity, with adequate nutrition and elimination  Outcome: Progressing Problem: Depression  Goal: Treatment Goal: Demonstrate behavioral control of depressive symptoms, verbalize feelings of improved mood/affect, and adopt new coping skills prior to discharge  Outcome: Progressing

## 2021-07-18 PROCEDURE — 99232 SBSQ HOSP IP/OBS MODERATE 35: CPT | Performed by: NURSE PRACTITIONER

## 2021-07-18 RX ORDER — AMOXICILLIN AND CLAVULANATE POTASSIUM 875; 125 MG/1; MG/1
1 TABLET, FILM COATED ORAL EVERY 12 HOURS SCHEDULED
Status: DISCONTINUED | OUTPATIENT
Start: 2021-07-18 | End: 2021-07-20 | Stop reason: HOSPADM

## 2021-07-18 RX ORDER — TRAMADOL HYDROCHLORIDE 50 MG/1
50 TABLET ORAL 2 TIMES DAILY
Status: DISCONTINUED | OUTPATIENT
Start: 2021-07-18 | End: 2021-07-20 | Stop reason: HOSPADM

## 2021-07-18 RX ADMIN — IBUPROFEN 400 MG: 400 TABLET ORAL at 18:34

## 2021-07-18 RX ADMIN — DULOXETINE 60 MG: 60 CAPSULE, DELAYED RELEASE ORAL at 08:26

## 2021-07-18 RX ADMIN — QUETIAPINE FUMARATE 100 MG: 100 TABLET ORAL at 21:17

## 2021-07-18 RX ADMIN — ACETAMINOPHEN 975 MG: 325 TABLET ORAL at 08:26

## 2021-07-18 RX ADMIN — GABAPENTIN 300 MG: 300 CAPSULE ORAL at 08:26

## 2021-07-18 RX ADMIN — GABAPENTIN 300 MG: 300 CAPSULE ORAL at 21:16

## 2021-07-18 RX ADMIN — ACETAMINOPHEN 975 MG: 325 TABLET ORAL at 14:54

## 2021-07-18 RX ADMIN — TRAMADOL HYDROCHLORIDE 50 MG: 50 TABLET, FILM COATED ORAL at 21:21

## 2021-07-18 RX ADMIN — IBUPROFEN 400 MG: 400 TABLET ORAL at 10:23

## 2021-07-18 RX ADMIN — TRAZODONE HYDROCHLORIDE 100 MG: 100 TABLET ORAL at 21:17

## 2021-07-18 RX ADMIN — MIDODRINE HYDROCHLORIDE 10 MG: 5 TABLET ORAL at 15:02

## 2021-07-18 RX ADMIN — IBUPROFEN 400 MG: 400 TABLET ORAL at 03:00

## 2021-07-18 RX ADMIN — LEVOTHYROXINE SODIUM 50 MCG: 50 TABLET ORAL at 06:56

## 2021-07-18 RX ADMIN — ATORVASTATIN CALCIUM 20 MG: 20 TABLET, FILM COATED ORAL at 08:26

## 2021-07-18 RX ADMIN — MIDODRINE HYDROCHLORIDE 10 MG: 5 TABLET ORAL at 08:26

## 2021-07-18 RX ADMIN — PANTOPRAZOLE SODIUM 40 MG: 40 TABLET, DELAYED RELEASE ORAL at 06:56

## 2021-07-18 RX ADMIN — AMOXICILLIN AND CLAVULANATE POTASSIUM 1 TABLET: 875; 125 TABLET, FILM COATED ORAL at 21:18

## 2021-07-18 RX ADMIN — TRAMADOL HYDROCHLORIDE 50 MG: 50 TABLET, FILM COATED ORAL at 11:14

## 2021-07-18 RX ADMIN — DOCUSATE SODIUM 50 MG AND SENNOSIDES 8.6 MG 1 TABLET: 8.6; 5 TABLET, FILM COATED ORAL at 17:10

## 2021-07-18 RX ADMIN — GABAPENTIN 300 MG: 300 CAPSULE ORAL at 15:02

## 2021-07-18 NOTE — PROGRESS NOTES
Progress Note - [facility] 60 y o  female MRN: [id number]  Unit/Bed#: Silas Stephenson 311-10 Encounter: [id number]    The patient was seen for continuing care and reviewed with treatment team   Staff notes patient has been depressed, anxious and preoccupied with tooth pain  This morning she says she is very depressed and frustrated because of her pain  She said it is now pulsating and throbbing and she was not able to sleep last night  She said she could not eat breakfast because she can not chew  She said that her tooth pain is the only thing she can think about and she does not feel like reading or doing anything else  She became tearful during encounter  Denies SI      Severe episode of recurrent major depressive disorder, without psychotic features ([facility] )    Vital signs in last 24 hours:  Temp:  [97 6 °F (36 4 °C)-97 8 °F (36 6 °C)] 97 6 °F (36 4 °C)  HR:  [74-78] 74  Resp:  [16-18] 16  BP: (125-133)/(65-72) 125/65    Mental Status Evaluation:    Appearance Adequate hygiene and grooming   Behavior cooperative   Mood anxious and depressed   Speech Normal rate and volume   Affect Tearful   Thought Processes Goal directed and coherent   Thought Content Does not verbalize delusional material   Perceptual Disturbances Denies hallucinations and does not appear to be responding to internal stimuli   Risk Potential Suicidal/Homicidal Ideation - No evidence of suicidal or homicidal ideation and patient does not verbalize suicidal or homicidal ideation  Risk of Violence - No evidence of risk for violence found on assessment  Risk of Self Mutilation - No evidence of risk for self mutilation found on assessment   Sensorium oriented to person, place and time/date   Cognition/Memory recent and remote memory grossly intact   Consciousness alert and awake   Attention/Concentration attention span and concentration appear shorter than expected for age   Insight intact   Judgement limited   Muscle Strength and Gait/Station normal muscle strength and normal muscle tone, normal gait/station and normal balance   Motor Activity no abnormal movements       Progress Toward Goals:  No change      Recommended Treatment:  Will start tramadol for pain  Consult Internal Medicine  Continue with pharmacotherapy, group therapy, milieu therapy and occupational therapy    The patient will be maintained on the following medications:  Current Facility-Administered Medications   Medication Dose Route Frequency Provider Last Rate    acetaminophen  650 mg Oral Q4H PRN Cathye Loupe, CRNP      acetaminophen  650 mg Oral Q4H PRN Cathye Loupe, CRNP      acetaminophen  975 mg Oral Q6H PRN Cathye Loupe, CRNP      albuterol  2 puff Inhalation Q4H PRN Josette Ruelas PA-C      aluminum-magnesium hydroxide-simethicone  30 mL Oral Q4H PRN Cathye Loupe, CRNP      atorvastatin  20 mg Oral Daily Josette Ruelas PA-C      baclofen  10 mg Oral TID PRN Lova KALEB Sarmiento      benztropine  1 mg Intramuscular Q4H PRN Max 6/day Cathye Loupe, CRNP      benztropine  0 5 mg Oral Q4H PRN Max 6/day Cathye Loupe, CRNP      DULoxetine  60 mg Oral Daily [facility], DO      gabapentin  300 mg Oral TID Aylin Sarmiento PA-C      ibuprofen  400 mg Oral Q6H PRN Cathye Loupe, CRCHINEDU      levothyroxine  50 mcg Oral Daily Josette Ruelas PA-C      LORazepam  1 mg Intramuscular Q6H PRN Max 3/day Cathye Loupe, CRNP      LORazepam  0 5 mg Oral Q6H PRN Max 4/day Cathye Loupe, CRNP      LORazepam  1 mg Oral Q6H PRN Max 3/day Cathye Loupe, CRNP      midodrine  10 mg Oral TID Josette Ruelas PA-C      mirtazapine  7 5 mg Oral HS PRN Cathye Loupe, CRNP      nicotine polacrilex  2 mg Oral Q2H PRN Cathye Loupe, CRNP      OLANZapine  5 mg Intramuscular Q3H PRN Max 3/day Cathye Loupe, CRNP      OLANZapine  5 mg Oral Q3H PRN Max 3/day Cathye Loupe, CRNP      pantoprazole  40 mg Oral Daily Josette Ruelas PA-C      QUEtiapine 100 mg Oral HS Sarah Steward MD      senna-docusate sodium  1 tablet Oral Daily PRN Brown Siemens, CRNP      traZODone  100 mg Oral HS Josette Ruelas PA-C         Severe episode of recurrent major depressive disorder, without psychotic features ([facility] )

## 2021-07-18 NOTE — NURSING NOTE
Pt was in a bed, appeared anxious  Reported tooth pain 9/10- was given Tylenol at 2206 that was effective for a short period of time  PRN Motrin given at 0300 was mildly effective, but  pt was able to fall asleep  No other complains

## 2021-07-18 NOTE — QUICK NOTE
Called by nursing since the patient was complaining of toothache  Patient noted to have multiple caries    Tenderness to palpation on the right maxillary premolar tooth-will start on antibiotics and monitor response

## 2021-07-18 NOTE — NURSING NOTE
Patient is isolative to room  Preoccupied with tooth pain  Rates pain 10/10  Became tearful this morning stating she "cannot focus on anything because the pain is too bad"  Tylenol given at 0830 motrin given at 1023  Ineffective  Tramadol given at 1115 partially effective  Denies depression rates anxiety 2/4  Medication complaint

## 2021-07-18 NOTE — PLAN OF CARE
Problem: Ineffective Coping  Goal: Identifies ineffective coping skills  Outcome: Progressing  Goal: Identifies healthy coping skills  Outcome: Progressing  Goal: Demonstrates healthy coping skills  Outcome: Progressing  Goal: Patient/Family participate in treatment and DC plans  Description: Interventions:  - Provide therapeutic environment  Outcome: Progressing  Goal: Patient/Family verbalizes awareness of resources  Outcome: Progressing  Goal: Understands least restrictive measures  Description: Interventions:  - Utilize least restrictive behavior  Outcome: Progressing  Goal: Free from restraint events  Description: - Utilize least restrictive measures   - Provide behavioral interventions   - Redirect inappropriate behaviors   Outcome: Progressing     Problem: Risk for Self Injury/Neglect  Goal: Treatment Goal: Remain safe during length of stay, learn and adopt new coping skills, and be free of self-injurious ideation, impulses and acts at the time of discharge  Outcome: Progressing  Goal: Verbalize thoughts and feelings  Description: Interventions:  - Assess and re-assess patient's lethality and potential for self-injury  - Engage patient in 1:1 interactions, daily, for a minimum of 15 minutes  - Encourage patient to express feelings, fears, frustrations, hopes  - Establish rapport/trust with patient   Outcome: Progressing  Goal: Refrain from harming self  Description: Interventions:  - Monitor patient closely, per order  - Develop a trusting relationship  - Supervise medication ingestion, monitor effects and side effects   Outcome: Progressing  Goal: Recognize maladaptive responses and adopt new coping mechanisms  Outcome: Progressing  Goal: Complete daily ADLs, including personal hygiene independently, as able  Description: Interventions:  - Observe, teach, and assist patient with ADLS  - Monitor and promote a balance of rest/activity, with adequate nutrition and elimination  Outcome: Progressing Problem: Depression  Goal: Treatment Goal: Demonstrate behavioral control of depressive symptoms, verbalize feelings of improved mood/affect, and adopt new coping skills prior to discharge  Outcome: Progressing  Goal: Verbalize thoughts and feelings  Description: Interventions:  - Assess and re-assess patient's level of risk   - Engage patient in 1:1 interactions, daily, for a minimum of 15 minutes   - Encourage patient to express feelings, fears, frustrations, hopes   Outcome: Progressing  Goal: Refrain from harming self  Description: Interventions:  - Monitor patient closely, per order   - Supervise medication ingestion, monitor effects and side effects   Outcome: Progressing  Goal: Refrain from isolation  Description: Interventions:  - Develop a trusting relationship   - Encourage socialization   Outcome: Progressing  Goal: Refrain from self-neglect  Outcome: Progressing  Goal: Complete daily ADLs, including personal hygiene independently, as able  Description: Interventions:  - Observe, teach, and assist patient with ADLS  -  Monitor and promote a balance of rest/activity, with adequate nutrition and elimination   Outcome: Progressing

## 2021-07-19 ENCOUNTER — TELEPHONE (OUTPATIENT)
Dept: PSYCHIATRY | Facility: CLINIC | Age: 66
End: 2021-07-19

## 2021-07-19 ENCOUNTER — TELEPHONE (OUTPATIENT)
Dept: NEUROLOGY | Facility: CLINIC | Age: 66
End: 2021-07-19

## 2021-07-19 PROBLEM — Z00.8 MEDICAL CLEARANCE FOR PSYCHIATRIC ADMISSION: Status: RESOLVED | Noted: 2021-07-13 | Resolved: 2021-07-19

## 2021-07-19 PROCEDURE — 99232 SBSQ HOSP IP/OBS MODERATE 35: CPT | Performed by: PSYCHIATRY & NEUROLOGY

## 2021-07-19 RX ORDER — POLYETHYLENE GLYCOL 3350 17 G/17G
17 POWDER, FOR SOLUTION ORAL DAILY
Status: DISCONTINUED | OUTPATIENT
Start: 2021-07-20 | End: 2021-07-19

## 2021-07-19 RX ORDER — POLYETHYLENE GLYCOL 3350 17 G/17G
17 POWDER, FOR SOLUTION ORAL DAILY PRN
Status: DISCONTINUED | OUTPATIENT
Start: 2021-07-19 | End: 2021-07-20 | Stop reason: HOSPADM

## 2021-07-19 RX ADMIN — TRAZODONE HYDROCHLORIDE 100 MG: 100 TABLET ORAL at 21:58

## 2021-07-19 RX ADMIN — DULOXETINE 60 MG: 60 CAPSULE, DELAYED RELEASE ORAL at 08:23

## 2021-07-19 RX ADMIN — ACETAMINOPHEN 975 MG: 325 TABLET ORAL at 02:10

## 2021-07-19 RX ADMIN — AMOXICILLIN AND CLAVULANATE POTASSIUM 1 TABLET: 875; 125 TABLET, FILM COATED ORAL at 08:23

## 2021-07-19 RX ADMIN — ATORVASTATIN CALCIUM 20 MG: 20 TABLET, FILM COATED ORAL at 08:23

## 2021-07-19 RX ADMIN — LEVOTHYROXINE SODIUM 50 MCG: 50 TABLET ORAL at 06:38

## 2021-07-19 RX ADMIN — TRAMADOL HYDROCHLORIDE 50 MG: 50 TABLET, FILM COATED ORAL at 08:23

## 2021-07-19 RX ADMIN — QUETIAPINE FUMARATE 100 MG: 100 TABLET ORAL at 21:59

## 2021-07-19 RX ADMIN — IBUPROFEN 400 MG: 400 TABLET ORAL at 14:30

## 2021-07-19 RX ADMIN — MIDODRINE HYDROCHLORIDE 10 MG: 5 TABLET ORAL at 08:23

## 2021-07-19 RX ADMIN — MIDODRINE HYDROCHLORIDE 10 MG: 5 TABLET ORAL at 21:57

## 2021-07-19 RX ADMIN — ACETAMINOPHEN 975 MG: 325 TABLET ORAL at 11:28

## 2021-07-19 RX ADMIN — IBUPROFEN 400 MG: 400 TABLET ORAL at 06:46

## 2021-07-19 RX ADMIN — GABAPENTIN 300 MG: 300 CAPSULE ORAL at 16:38

## 2021-07-19 RX ADMIN — TRAMADOL HYDROCHLORIDE 50 MG: 50 TABLET, FILM COATED ORAL at 21:58

## 2021-07-19 RX ADMIN — GABAPENTIN 300 MG: 300 CAPSULE ORAL at 21:59

## 2021-07-19 RX ADMIN — GABAPENTIN 300 MG: 300 CAPSULE ORAL at 08:23

## 2021-07-19 RX ADMIN — MIDODRINE HYDROCHLORIDE 10 MG: 5 TABLET ORAL at 16:38

## 2021-07-19 RX ADMIN — PANTOPRAZOLE SODIUM 40 MG: 40 TABLET, DELAYED RELEASE ORAL at 06:38

## 2021-07-19 RX ADMIN — POLYETHYLENE GLYCOL 3350 17 G: 17 POWDER, FOR SOLUTION ORAL at 23:04

## 2021-07-19 RX ADMIN — ACETAMINOPHEN 975 MG: 325 TABLET ORAL at 17:47

## 2021-07-19 RX ADMIN — AMOXICILLIN AND CLAVULANATE POTASSIUM 1 TABLET: 875; 125 TABLET, FILM COATED ORAL at 21:59

## 2021-07-19 NOTE — PROGRESS NOTES
Pt was provided with a guide and weekly schedule sheet to explore for setting up her aftercare week  Pt  plans to spread out her stressors in order to have more energy to deal with them throughout the day      07/19/21 1015 07/19/21 1330   Activity/Group Checklist   Group Community meeting  ([place] [facility] life balance disucussion ) Life Skills  (life balance and schedule awareness )   Attendance Attended Attended   Attendance Duration (min) 31-45 31-45   Interactions Interacted appropriately Interacted appropriately  (reflected desire to spread out her stress tasks in the day )   Affect/Mood Appropriate;Calm;Normal range Appropriate;Normal range   Goals Achieved Able to engage in interactions Able to engage in interactions; Discussed coping strategies; Identified feelings; Able to reflect/comment on own behavior;Able to self-disclose

## 2021-07-19 NOTE — NURSING NOTE
Scheduled tramadol given for pain at bed time was helpful for a while  Pt was up at 0200 complaining on severe tooth pain  Was given Tylenol 975 mg at 0210 and ice pack to relieve the pain  Pt was able to go back to sleep  Will continue to monitor

## 2021-07-19 NOTE — TELEPHONE ENCOUNTER
A MATT from [facility] called to get this pt an appt with our office with a estimated d/c date of 7/20/21  She is currently a pt at [facility] but would like to see a physician at our office  The call was tx to intake

## 2021-07-19 NOTE — TELEPHONE ENCOUNTER
I called the patient and left a voicemail in regards to changing her appointment time for Thursday, 7/22 from 12 pm to 1 pm for her EMG at [facility]  I left the Monica Dsouza back line phone number to call back

## 2021-07-19 NOTE — NURSING NOTE
Pt is reporting anxiety 3/4 today, which is related to her tooth pain  Pt continues to have pain, it has minimally improved since starting her antibiotic  Pt offers no complaints, is medication compliant

## 2021-07-19 NOTE — CASE MANAGEMENT
CM informed by Dr Bella Vann of pt's d/c tomorrow and pt's request to see a psychiatrist and not nurse practitioner  Call made to [facility] in [facility], confirmed pt's appt on 7/26  CM informed there are no psychiatrists at that location; recommendation made for pt to see MD at another location  CM kept current appt for 7/26  Call made to [facility]  location; informed there is current wait list for therapist  MATT lvm for intake requesting c/b to switch psychiatrists at pt's request      CM met with pt, reviewed above information  Pt in agreement with remaining at [place] [address] location till able to switch services to [place]  Pt reports her sister will provide d/c transportation,  at 1130am  Pt confirmed wanting d/c scripts sent to [facility] in [place] [address] call from Tristen Watkins in [place] intake; reports she will have someone from intake contact pt at home with appt information

## 2021-07-19 NOTE — PROGRESS NOTES
Progress Note - Behavioral Health   William Millan 60 y o  female MRN: [id number]  Unit/Bed#: [facility] 805-41 Encounter: [id number]    Assessment/Plan   Principal Problem:    Severe episode of recurrent major depressive disorder, without psychotic features ([email or web address] )  Active Problems:    Alcohol dependence in remission ([email or web address] 75 )    Cervical myelopathy with cervical radiculopathy (HCC)    Orthostatic hypotension    Mixed dyslipidemia    Adult hypothyroidism    Gastroesophageal reflux disease    Neuropathy    Medical clearance for psychiatric admission    PTSD (post-traumatic stress disorder)      Recommended Treatment:   Increase Cymbalta to 90 mg daily, for depression/anxiety, plan for discharge Tuesday 07/20/2021  (patient on 60 mg b i d  in past)  Continue Seroquel 100 mg q d p o for sleep  Continue Trazodone 100 mg q d p o for sleep  Continue with group therapy, milieu therapy and occupational therapy  Continue frequent safety checks and vitals per unit protocol  Case discussed with treatment team   Risks, benefits and possible side effects of Medications: Risks, benefits, and possible side effects of medications have been explained to the patient, who verbalizes understanding    ------------------------------------------------------------    Subjective: Per nursing report, Hemant Mayer has been cooperative on the unit and compliant with medications  Today, Hemant Mayer is consenting for safety on the unit  She reports she felt neglected over weekend but is appreciating how staff is more responsive patient also reports less to ache pain  Patient is on antibiotics for her toothache, prescribed by Internal Medicine  Patient stated that she was able to read 2 books over the weekend otherwise she felt like it was a waste because to know group therapy sessions  Patient rates her depression 3/10 anxiety 104 only pain related  Patient denies SI/HI/AVH and is looking forward to her discharge tomorrow    Patient states she can not handle rejection and has thought about the need to work full-time and realizing that she puts too much pressure on herself so will try to back off on her obligations  Progress Toward Goals: slow improvement    Psychiatric Review of Systems:  Behavior over the last 24 hours: improved  Sleep: improving  Appetite: adequate  Medication side effects: none verbalized  ROS: Complete review of systems is negative except as noted above      Vital signs in last 24 hours:  Temp:  [96 9 °F (36 1 °C)-97 8 °F (36 6 °C)] 97 8 °F (36 6 °C)  HR:  [77-86] 86  Resp:  [16-18] 16  BP: (112-170)/(72-89) 127/76    Mental Status Exam:  Appearance:  alert, good eye contact, appears stated age, casually dressed and appropriate grooming and hygiene   Behavior:  calm, cooperative and sitting comfortably   Motor: no abnormal movements and normal gait and balance   Speech:  spontaneous, normal volume and coherent   Mood:  "Pretty good"   Affect:  euthymic   Thought Process:  organized, logical, goal directed   Thought Content: no verbalized delusions or overt paranoia   Perceptual disturbances: no reported hallucinations and does not appear to be responding to internal stimuli at this time   Risk Potential: No active or passive suicidal or homicidal ideation was verbalized during interview, Low potential for aggression based on previous behavior   Cognition: oriented to person, place, time, and situation, memory grossly intact, appears to be of average intelligence, age-appropriate attention span and concentration and cognition not formally tested   Insight:  Improving   Judgment: Improving     Current Medications:  Current Facility-Administered Medications   Medication Dose Route Frequency Provider Last Rate    acetaminophen  650 mg Oral Q4H PRN Lynford Basilio, CRNP      acetaminophen  650 mg Oral Q4H PRN Lynford Basilio, CRNP      acetaminophen  975 mg Oral Q6H PRN Lynford Basilio, CRNP      albuterol  2 puff Inhalation Q4H PRN Amrit Pouch KALEB Ruelas      aluminum-magnesium hydroxide-simethicone  30 mL Oral Q4H PRN Radha Li, CRCHINEDU      amoxicillin-clavulanate  1 tablet Oral Q12H [facility] Evelyn Keyes MD      atorvastatin  20 mg Oral Daily Josette Ruelas PA-C      baclofen  10 mg Oral TID PRN Stephen Junior PA-C      benztropine  1 mg Intramuscular Q4H PRN Max 6/day Radha Li, CRCHINEDU      benztropine  0 5 mg Oral Q4H PRN Max 6/day Radha Li, CRNP      [START ON 7/20/2021] DULoxetine  90 mg Oral Daily Jorge Li DO      gabapentin  300 mg Oral TID Stephen Junior PA-C      ibuprofen  400 mg Oral Q6H PRN Radha Li, CRCHINEDU      levothyroxine  50 mcg Oral Daily Josette Ruelas PA-C      LORazepam  1 mg Intramuscular Q6H PRN Max 3/day Radha Li, CRCHINEDU      LORazepam  0 5 mg Oral Q6H PRN Max 4/day Radha Li, CRCHINEDU      LORazepam  1 mg Oral Q6H PRN Max 3/day Radha Li, SHRAVAN      midodrine  10 mg Oral TID Josette Ruelas PA-C      mirtazapine  7 5 mg Oral HS PRN Radha Li, CRCHINEDU      nicotine polacrilex  2 mg Oral Q2H PRN Radha Li, CRCHINEDU      OLANZapine  5 mg Intramuscular Q3H PRN Max 3/day Radha Li, CRNP      OLANZapine  5 mg Oral Q3H PRN Max 3/day Radha Li, SHRAVAN      pantoprazole  40 mg Oral Daily Josette Ruelas PA-C      QUEtiapine  100 mg Oral HS Edward Linares MD      senna-docusate sodium  1 tablet Oral Daily PRN Radha Li, CRCHINEDU      traMADol  50 mg Oral BID Radha Li, SHRAVAN      traZODone  100 mg Oral HS Josette Ruelas PA-C         Behavioral Health Medications: all current active meds have been reviewed  Changes as in plan section above  Laboratory results:  I have personally reviewed all pertinent laboratory/tests results  No results found for this or any previous visit (from the past 48 hour(s))       Asad Willard DO,

## 2021-07-19 NOTE — TELEPHONE ENCOUNTER
IN pt hope called that Mickie Lopez would like to schedule with a DR from the [facility] office  She is going to see Jeanna Jonas on 7/26  She wants to see a Dr rather than a PA   She also wants to schedule with a therapist  I have placed her on the wait list

## 2021-07-20 VITALS
BODY MASS INDEX: 28 KG/M2 | DIASTOLIC BLOOD PRESSURE: 70 MMHG | RESPIRATION RATE: 16 BRPM | SYSTOLIC BLOOD PRESSURE: 110 MMHG | WEIGHT: 164.02 LBS | OXYGEN SATURATION: 90 % | HEART RATE: 67 BPM | HEIGHT: 64 IN | TEMPERATURE: 97.6 F

## 2021-07-20 PROBLEM — K08.89 TOOTHACHE: Status: ACTIVE | Noted: 2021-07-20

## 2021-07-20 PROCEDURE — 99238 HOSP IP/OBS DSCHRG MGMT 30/<: CPT | Performed by: PSYCHIATRY & NEUROLOGY

## 2021-07-20 RX ORDER — GABAPENTIN 300 MG/1
300 CAPSULE ORAL 3 TIMES DAILY
Qty: 90 CAPSULE | Refills: 0 | Status: SHIPPED | OUTPATIENT
Start: 2021-07-20 | End: 2021-09-13 | Stop reason: SDUPTHER

## 2021-07-20 RX ORDER — BACLOFEN 10 MG/1
10 TABLET ORAL AS NEEDED
Start: 2021-07-20 | End: 2022-02-10

## 2021-07-20 RX ORDER — QUETIAPINE FUMARATE 100 MG/1
100 TABLET, FILM COATED ORAL
Qty: 30 TABLET | Refills: 0 | Status: SHIPPED | OUTPATIENT
Start: 2021-07-20 | End: 2021-09-02 | Stop reason: SDUPTHER

## 2021-07-20 RX ORDER — DULOXETIN HYDROCHLORIDE 30 MG/1
90 CAPSULE, DELAYED RELEASE ORAL DAILY
Qty: 90 CAPSULE | Refills: 0 | Status: SHIPPED | OUTPATIENT
Start: 2021-07-21 | End: 2021-08-16 | Stop reason: SDUPTHER

## 2021-07-20 RX ORDER — TRAZODONE HYDROCHLORIDE 100 MG/1
100 TABLET ORAL
Qty: 30 TABLET | Refills: 0 | Status: SHIPPED | OUTPATIENT
Start: 2021-07-20 | End: 2021-08-16 | Stop reason: SDUPTHER

## 2021-07-20 RX ORDER — AMOXICILLIN AND CLAVULANATE POTASSIUM 875; 125 MG/1; MG/1
1 TABLET, FILM COATED ORAL EVERY 12 HOURS SCHEDULED
Qty: 10 TABLET | Refills: 0 | Status: SHIPPED | OUTPATIENT
Start: 2021-07-20 | End: 2021-07-25

## 2021-07-20 RX ADMIN — GABAPENTIN 300 MG: 300 CAPSULE ORAL at 08:05

## 2021-07-20 RX ADMIN — ACETAMINOPHEN 975 MG: 325 TABLET ORAL at 03:28

## 2021-07-20 RX ADMIN — MIDODRINE HYDROCHLORIDE 10 MG: 5 TABLET ORAL at 08:05

## 2021-07-20 RX ADMIN — TRAMADOL HYDROCHLORIDE 50 MG: 50 TABLET, FILM COATED ORAL at 08:05

## 2021-07-20 RX ADMIN — AMOXICILLIN AND CLAVULANATE POTASSIUM 1 TABLET: 875; 125 TABLET, FILM COATED ORAL at 08:06

## 2021-07-20 RX ADMIN — LEVOTHYROXINE SODIUM 50 MCG: 50 TABLET ORAL at 06:20

## 2021-07-20 RX ADMIN — PANTOPRAZOLE SODIUM 40 MG: 40 TABLET, DELAYED RELEASE ORAL at 06:20

## 2021-07-20 RX ADMIN — DULOXETINE 90 MG: 60 CAPSULE, DELAYED RELEASE ORAL at 08:05

## 2021-07-20 RX ADMIN — ATORVASTATIN CALCIUM 20 MG: 20 TABLET, FILM COATED ORAL at 08:05

## 2021-07-20 NOTE — NURSING NOTE
C/O right of the mouth toothache 7/10, tylenol was given at 0328 and it was effective as patient was asleep on reassessment

## 2021-07-20 NOTE — PROGRESS NOTES
Pt  Returned her journal book claiming that she would not be using it at home  Pt  relieved to be discharged this morning and going to an emergency dental visit  Pt  calm and appreciative to staff       07/20/21 1000 07/20/21 1100   Activity/Group Checklist   Group Other (Comment)  (Mental health recovery) Exercise   Attendance Attended Did not attend   Attendance Duration (min) 31-45  --    Interactions Interacted appropriately  --    Affect/Mood Appropriate  --    Goals Achieved Identified feelings; Discussed coping strategies; Able to listen to others; Able to engage in interactions; Able to reflect/comment on own behavior  --

## 2021-07-20 NOTE — CASE MANAGEMENT
07/20/21 0852   Team Meeting   Meeting Type Daily Rounds   Initial Conference Date 07/20/21   Team Members Present   Team Members Present Physician;;Nurse;;Occupational Therapist   Physician Team Member Dr Ana María Pickett, Dr [facility] Team Member Alcides Bell, RN   Care Management Team Member [address]  Work Team Member Puja   OT Team Member Letha RIVERA   Patient/Family Present   Patient Present No   Patient's Family Present No     Cont to report tooth pain, anxiety 3/4, multiple PRNs given, d/c today home with outpatient follow up

## 2021-07-20 NOTE — DISCHARGE SUMMARY
Discharge Summary - [facility] July 60 y o  female MRN: [id number]  Unit/Bed#: Fidelina Salazar 056-24 Encounter: [id number]     Admission Date:   Admission Orders (From admission, onward)     Ordered        07/13/21 2212  ED TO DIFFERENT CAMPUS  [address] UNIT or INPATIENT MEDICAL UNIT to [facility] UNIT (using Discharge Readmit Navigator) - Admit Patient to [address] [place] [address] Unit  Once                       Discharge Date: 07/20/21     Attending Psychiatrist: Sanchez Chery MD    Discharge Diagnosis:   Principal Problem:    Severe episode of recurrent major depressive disorder, without psychotic features ([facility] )  Active Problems:    Alcohol dependence in remission ([facility] )    Cervical myelopathy with cervical radiculopathy (HCC)    Orthostatic hypotension    Mixed dyslipidemia    Adult hypothyroidism    Gastroesophageal reflux disease    Neuropathy    PTSD (post-traumatic stress disorder)    Toothache      Reason for Admission: From H&P by Jessica Jc DO on 07/14/2021:  Hollie kim 60 y  o  female, , living with , bachelor's education, on SSD, with psychiatric history of recurrent MDD, ELLY, PTSD, significant trauma, past alcohol use disorder (sober for 25 years, attends AA regularly), past gambling disorder, and medical history of GERD, dyslipidemia, dysphagia, bilateral leg weakness, syncope due to orthostatic hypotension, epigastric abdominal pain, hypothyroidism, neuropathy, and cervical myelopathy with cervical radiculopathy  Patient arrived at [place] [address] ED after speaking to the suicidal hotline on 07/13 due to a weekend of depression, suicidal thoughts and plan to overdose on trazodone  Patient reports the trigger for this depressive episode was 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[employer]   Patient was admitted to [address] [place] [address] 07/14 voluntarily via 201      Patient reported 8/10 depression and 8/10 anxiety directly stemming from not getting the job offer this weekend and financial troubles  Patient reported having "suicidal thoughts over the weekend but with no real plan, probably take some of my pills " Patient was very tearful during the conversation but was redirectable  Patient reported issues with current OP provider and inability to receive her Cymbalta and stated "that was my main motivation to come here " Patient screened positive for depression and PTSD  Patient screened negative for gertrude or OCD  Please see initial H&P for full details  Hospital Course: On admission, Nadia Salinas was started on Cymbalta 30 mg daily for depression anxiety, Seroquel 100 mg at bedtime for sleep and trazodone 100 mg at bedtime for sleep  Her medications were titrated as appropriate, including Cymbalta 90 mg daily  She was also started on gabapentin 300 mg 3 times a day for neuropathic pain, this was decreased from a previously prescribed 600 mg 3 times a day due to patient complaint of restlessness/hyperkinesia  She tolerated these medications with no acute side effects  The patient's mood brightened over the course of treatment, and she was seen in [facility] interacting appropriately with peers  Nadia Salinas did not demonstrate dangerous behavior to self or others during her inpatient stay  Patient did develop extreme tooth pain 2-3 days prior to discharge and was seen by [facility] team and prescribed Augmentin 875-125 mg every 12 hours and case management called and set up an urgent dental appointment within 2 hours after discharge  On the day of discharge, Nadia Salinas denied suicidal or homicidal ideations  She reported "I do not feel as good as before because of my tooth ache " Patient was provided supportive and motivational therapy and did state that she feels safe for discharge and that she will feel better once she visits the dentist today  Patient stated no ongoing depression or severe anxiety aside from her preoccupation with her dental problem  Patient denied any current or in the past auditory or visual hallucinations  Patient reported she has a good support system with her spouse and family and will also make sure to make more time for herself and not burden herself with more work or social obligations where she can not say no to others  I reviewed with Osmany Soliman the importance of compliance with medications and outpatient treatment after discharge  I discussed the medication regimen and possible side effects of the medications with Osmany Soliman prior to discharge  At the time of discharge she was tolerating psychiatric medications  I discussed outpatient follow up with Osmany Soliman  I reviewed with Osmany Soliman crisis plan and safety plan upon discharge  Osmany Soliman was competent to understand risks and benefits of withholding information and risks and benefits of her actions  Labs/Imaging:   I have personally reviewed all pertinent laboratory/tests results    Most Recent Labs:   Lab Results   Component Value Date    WBC 6 64 07/13/2021    RBC 4 46 07/13/2021    HGB 12 0 07/13/2021    HCT 38 2 07/13/2021     07/13/2021    RDW 13 2 07/13/2021    NEUTROABS 4 11 07/13/2021    SODIUM 144 07/13/2021    K 4 8 07/13/2021     07/13/2021    CO2 29 07/13/2021    BUN 12 07/13/2021    CREATININE 0 86 07/13/2021    GLUC 97 07/13/2021    GLUF 97 10/21/2020    CALCIUM 8 8 07/13/2021    AST 17 07/13/2021    ALT 30 07/13/2021    ALKPHOS 79 07/13/2021    TP 6 5 07/13/2021    ALB 3 5 07/13/2021    TBILI 0 26 07/13/2021    CHOLESTEROL 170 02/06/2020    HDL 55 02/06/2020    TRIG 114 02/06/2020    LDLCALC 92 02/06/2020    ZHI7STDSYAWL 1 852 07/13/2021    FREET4 0 76 10/20/2020    T3FREE 2 14 (L) 10/20/2020    HGBA1C 6 1 (H) 02/06/2020     02/06/2020       Mental Status Exam:  Appearance:  alert, good eye contact, appears stated age, casually dressed and appropriate grooming and hygiene   Behavior:  calm, cooperative and sitting comfortably   Motor: no abnormal movements and normal gait and balance   Speech:  spontaneous, normal rate, normal volume and coherent   Mood:  "I do not feel as good as before because of my tooth ache"   Affect:  anxious   Thought Process:  organized, logical, goal directed, normal rate of thoughts   Thought Content: no verbalized delusions or overt paranoia   Perceptual disturbances: no reported hallucinations and does not appear to be responding to internal stimuli at this time   Risk Potential: No active or passive suicidal or homicidal ideation was verbalized during interview, Low potential for aggression based on previous behavior   Cognition: oriented to person, place, time, and situation, memory grossly intact, appears to be of average intelligence, age-appropriate attention span and concentration and cognition not formally tested   Insight:  Fair   Judgment: Fair     Discharge Medications:  See list below, as well as the after visit summary containing reconciled discharge medications provided to patient and family  Discharge instructions/Information to patient and family:   See after visit summary for information provided to patient and family  Provisions for Follow-Up Care:  See after visit summary for information related to follow-up care and any pertinent home health orders

## 2021-07-20 NOTE — NURSING NOTE
Patient was isolative to her room but came out to make her needs known to staff  VSS  Rate depression 3/10, right face pain 5/10, denies all other s/s at this time  Patient had routine tramadol 50 mg for pain  Miralax was given at 2304  Had snack  Cooperative and compliant with HS medications  Safety checks ongoing

## 2021-07-20 NOTE — CASE MANAGEMENT
Pt requested assistance in scheduling emergent appointment with her dentist Dr Neymar Newman in [facility] for today   Call made to dentist's office; scheduled emergency appointment today at 1215pm

## 2021-07-20 NOTE — PLAN OF CARE
Problem: DISCHARGE PLANNING  Goal: Discharge to home or other facility with appropriate resources  Description: INTERVENTIONS:  - Identify barriers to discharge w/patient and caregiver  - Arrange for needed discharge resources and transportation as appropriate  - Identify discharge learning needs (meds, wound care, etc )  - Arrange for interpretive services to assist at discharge as needed  - Refer to Case Management Department for coordinating discharge planning if the patient needs post-hospital services based on physician/advanced practitioner order or complex needs related to functional status, cognitive ability, or social support system  Outcome: Adequate for Discharge     Discharge return home; transportation via [facility]  Pt to follow up with current Tristen Alcaraz provider in [place] until opening available at [facility]

## 2021-07-20 NOTE — PLAN OF CARE
Problem: Ineffective Coping  Goal: Cooperates with admission process  Description: Interventions:   - Complete admission process  Outcome: Completed  Goal: Identifies ineffective coping skills  Outcome: Completed  Goal: Identifies healthy coping skills  Outcome: Completed  Goal: Demonstrates healthy coping skills  Outcome: Completed  Goal: Participates in unit activities  Description: Interventions:  - Provide therapeutic environment   - Provide required programming   - Redirect inappropriate behaviors   Outcome: Completed  Goal: Patient/Family participate in treatment and DC plans  Description: Interventions:  - Provide therapeutic environment  Outcome: Completed  Goal: Patient/Family verbalizes awareness of resources  Outcome: Completed  Goal: Understands least restrictive measures  Description: Interventions:  - Utilize least restrictive behavior  Outcome: Completed  Goal: Free from restraint events  Description: - Utilize least restrictive measures   - Provide behavioral interventions   - Redirect inappropriate behaviors   Outcome: Completed     Problem: Risk for Self Injury/Neglect  Goal: Treatment Goal: Remain safe during length of stay, learn and adopt new coping skills, and be free of self-injurious ideation, impulses and acts at the time of discharge  Outcome: Completed  Goal: Verbalize thoughts and feelings  Description: Interventions:  - Assess and re-assess patient's lethality and potential for self-injury  - Engage patient in 1:1 interactions, daily, for a minimum of 15 minutes  - Encourage patient to express feelings, fears, frustrations, hopes  - Establish rapport/trust with patient   Outcome: Completed  Goal: Refrain from harming self  Description: Interventions:  - Monitor patient closely, per order  - Develop a trusting relationship  - Supervise medication ingestion, monitor effects and side effects   Outcome: Completed  Goal: Attend and participate in unit activities, including therapeutic, recreational, and educational groups  Description: Interventions:  - Provide therapeutic and educational activities daily, encourage attendance and participation, and document same in the medical record  - Obtain collateral information, encourage visitation and family involvement in care   Outcome: Completed  Goal: Recognize maladaptive responses and adopt new coping mechanisms  Outcome: Completed  Goal: Complete daily ADLs, including personal hygiene independently, as able  Description: Interventions:  - Observe, teach, and assist patient with ADLS  - Monitor and promote a balance of rest/activity, with adequate nutrition and elimination  Outcome: Completed     Problem: Depression  Goal: Treatment Goal: Demonstrate behavioral control of depressive symptoms, verbalize feelings of improved mood/affect, and adopt new coping skills prior to discharge  Outcome: Completed  Goal: Verbalize thoughts and feelings  Description: Interventions:  - Assess and re-assess patient's level of risk   - Engage patient in 1:1 interactions, daily, for a minimum of 15 minutes   - Encourage patient to express feelings, fears, frustrations, hopes   Outcome: Completed  Goal: Refrain from harming self  Description: Interventions:  - Monitor patient closely, per order   - Supervise medication ingestion, monitor effects and side effects   Outcome: Completed  Goal: Refrain from isolation  Description: Interventions:  - Develop a trusting relationship   - Encourage socialization   Outcome: Completed  Goal: Refrain from self-neglect  Outcome: Completed  Goal: Attend and participate in unit activities, including therapeutic, recreational, and educational groups  Description: Interventions:  - Provide therapeutic and educational activities daily, encourage attendance and participation, and document same in the medical record   Outcome: Completed  Goal: Complete daily ADLs, including personal hygiene independently, as able  Description: Interventions:  - Observe, teach, and assist patient with ADLS  -  Monitor and promote a balance of rest/activity, with adequate nutrition and elimination   Outcome: Completed     Problem: DISCHARGE PLANNING  Goal: Discharge to home or other facility with appropriate resources  Description: INTERVENTIONS:  - Identify barriers to discharge w/patient and caregiver  - Arrange for needed discharge resources and transportation as appropriate  - Identify discharge learning needs (meds, wound care, etc )  - Arrange for interpretive services to assist at discharge as needed  - Refer to Case Management Department for coordinating discharge planning if the patient needs post-hospital services based on physician/advanced practitioner order or complex needs related to functional status, cognitive ability, or social support system  Outcome: Completed

## 2021-07-20 NOTE — BH TRANSITION RECORD
Contact Information: If you have any questions, concerns, pended studies, tests and/or procedures, or emergencies regarding your inpatient behavioral health visit  Please contact [address] [place] [address] older adult behavioral health unit 6T [phone] and ask to speak to a , nurse or physician  A contact is available 24 hours/ 7 days a week at this number  Summary of Procedures Performed During your Stay:  Below is a list of major procedures performed during your hospital stay and a summary of results:  - No major procedures performed  If studies are pending at discharge, follow up with your PCP and/or referring provider

## 2021-07-21 DIAGNOSIS — K21.9 GASTROESOPHAGEAL REFLUX DISEASE, UNSPECIFIED WHETHER ESOPHAGITIS PRESENT: ICD-10-CM

## 2021-07-21 RX ORDER — PANTOPRAZOLE SODIUM 40 MG/1
TABLET, DELAYED RELEASE ORAL
Qty: 60 TABLET | Refills: 2 | Status: SHIPPED | OUTPATIENT
Start: 2021-07-21 | End: 2021-09-27 | Stop reason: SDUPTHER

## 2021-07-21 NOTE — TELEPHONE ENCOUNTER
The patient returned the call, and will be arriving at [facility] at 1:00 pm for her EMG

## 2021-07-22 ENCOUNTER — HOSPITAL ENCOUNTER (OUTPATIENT)
Dept: NEUROLOGY | Facility: HOSPITAL | Age: 66
Discharge: HOME | End: 2021-07-22
Payer: COMMERCIAL

## 2021-07-22 DIAGNOSIS — M54.16 RADICULOPATHY OF LUMBAR REGION: Chronic | ICD-10-CM

## 2021-07-22 DIAGNOSIS — G62.9 NEUROPATHY: ICD-10-CM

## 2021-07-22 PROCEDURE — 95886 MUSC TEST DONE W/N TEST COMP: CPT | Performed by: PSYCHIATRY & NEUROLOGY

## 2021-07-22 PROCEDURE — 95910 NRV CNDJ TEST 7-8 STUDIES: CPT | Performed by: PSYCHIATRY & NEUROLOGY

## 2021-07-26 ENCOUNTER — OFFICE VISIT (OUTPATIENT)
Dept: PSYCHIATRY | Facility: CLINIC | Age: 66
End: 2021-07-26
Payer: COMMERCIAL

## 2021-07-26 VITALS — HEIGHT: 64 IN | BODY MASS INDEX: 28.44 KG/M2 | WEIGHT: 166.6 LBS

## 2021-07-26 DIAGNOSIS — F41.1 GENERALIZED ANXIETY DISORDER: ICD-10-CM

## 2021-07-26 DIAGNOSIS — F51.01 PRIMARY INSOMNIA: ICD-10-CM

## 2021-07-26 DIAGNOSIS — F33.41 MAJOR DEPRESSIVE DISORDER, RECURRENT, IN PARTIAL REMISSION (HCC): Primary | ICD-10-CM

## 2021-07-26 DIAGNOSIS — F63.0 GAMBLING DISORDER, MODERATE: ICD-10-CM

## 2021-07-26 PROCEDURE — 90833 PSYTX W PT W E/M 30 MIN: CPT | Performed by: NURSE PRACTITIONER

## 2021-07-26 PROCEDURE — 99214 OFFICE O/P EST MOD 30 MIN: CPT | Performed by: NURSE PRACTITIONER

## 2021-07-26 NOTE — BH TREATMENT PLAN
TREATMENT PLAN         [facility]    Name and Date of Birth:  Katie Morrow 60 y o  1955    Date of Treatment Plan: July 26, 2021    Diagnosis/Diagnoses:    1  Major depressive disorder, recurrent, in partial remission ([facility] )    2  Generalized anxiety disorder    3  Gambling disorder, moderate    4  Primary insomnia      Strengths/Personal Resources for Self-Care: financial security, ability to express needs, motivation for treatment     Area/Areas of need (in own words): depression, gambling addiction, emotional insecurity  1          Long Term Goal: continue to improve control of depression  Target date - 1/27/2022  Person/Persons responsible for completion of goal: sarah Buckley     2          Short Term Objective (s) - How will we reach this goal?:   A  Provider new recommended medication/dosage changes and/or continue medication(s): Seroquel, Cymbalta, Trazodone  B  take medications as prescribed, attend scheduled appointments  C  referred pt for psychotherapy  Target date - 1/27/2022  Person/Persons Responsible for Completion of Goal: sarha Buckley      Progress Towards Goals: progressing     Treatment Modality: medication management every 12 weeks     Review due 120 - 180 days from date of this plan: 1/27/2022  Expected length of service: ongoing treatment  My Physician/PA/NP and I have developed this plan together and I agree to work on the goals and objectives  I understand the treatment goals that were developed for my treatment

## 2021-07-26 NOTE — PATIENT INSTRUCTIONS
Continue current medications:                duloxetine 60 mg capsule - take 1 capsule by mouth every day               duloxetine 30 mg capsule - take 1 capsule by mouth every day               Note: total daily dose of duloxetine  = 90 mg              quetiapine 100 mg tablet - take 1 tablet by mouth daily at bedtime              trazodone 100 mg tablet - take 1 tablet by mouth daily at bedtime     You have been referred for psychotherapy

## 2021-07-26 NOTE — PSYCH
This note was not shared with the patient due to this is a psychotherapy note    PROGRESS NOTE        Pavan Jaclyn      Name and Date of Birth:  Wilbur Haro 60 y o  1955    Date of Visit: 07/26/21    This patient was seen in the office today for medication management and psychotherapy  COVID-19 precautions were followed at all times: masks were worn by this patient and by this writer at all times, social distancing was maintained  Time spent on psychotherapy: 25 minutes    Treatment modality: medication management; medication education, reinforced adaptive behavior, behavioral assessment      SUBJECTIVE: pt says that she was running out of duloxetine, and was leaving voicemail messages in [facility]'s prescription refill request mailbox, left three messages there, none were returned or answered [NB: voice mailbox is inactive] and she did not have duloxetine for three weeks, maybe felt a little abandoned by this writer and [facility]  She was distraught and felt suicidal, went to [address] [place] [address] ED and was admitted into [address] [place] [address] inpatient psych unit and discharged to home on 7/20  The physician that she saw while inpatient increased duloxetine to 90 mg per day  Prescriptions for all of her medications there were sent to her pharmacy, and she does not need refills of any today  Pt says that she does not want to see a different provider, but would like to see a psychotherapist  She was in therapy for 12 weeks for gambling, although she says that it touched on general life issues and did not focus only on gambling  She says that she has not gambled since 12/20/2020, and that 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      She says that the immediate trigger for her suicidality was that she had been turned down for a job as  for [employer]; she had three interviews and thought they went well and that she would he hired, but eventually was not, and she was very disappointed; she says that her self image and self-worth are dependent on working  She says also that Father's Day was a very disturbing day, her  was upset, her mother's presence in their house was problematic for pt and her , and it culminated in an argument  Pt says that whenever they argue she thinks he wants a divorce  This stems from PTSD from experiences in her childhood  Pt agrees that psychotherapy would also help her with emotional self-regulation, so that she can cope with stressors in ways other than becoming suicidal     Pt says that her physical pain is much less  4/26/2021: doing well  Her moods have been much more stable, no high-highs or low-lows  Some things upset her more than she thinks they probably should, like disagreements with her   She is really troubled when there is discord  She saw psychotherapist Jay Corona [pt forgets his last name] from [employer] on [facility], had 12 sessions with him virtually and found them very helpful in achieving and continuing remission from gambling addiction  Also attends [employer] meetings online and passed her 80 days recently, also sometimes attends AA on Fridays  Did not get last refill of naltrexone, thinks she did need it in the beginning, no longer though, didn't notice a difference in desire to [place]  Was scheduled to have first COVID vaccine but her  talked her out of it  She is waiting until she has recuperated from her back surgery scheduled for next week; sometimes she feels a little bullied by her , she has to remember that she has a mind and can make decisions for herself  Did not get last refill of naltrexone, thinks she did need it in the beginning, didn't notice a difference in desire to [place]  Her near vision is improved but her distance vision is not that good so last week she ordered glasses for distance vision   Stop naltrexone 50 mg tab (pt has not been taking it)  Continue: duloxetine 60 mg cap po BID; quetiapine 100 mg po qHS; trazodone 100 mg po qHS     2/15/2021: doing very well, going to [facility], sees a psychotherapist, has a sponsor and attending AA sober for 25 years  Wonders if she needs to take naltrexone but will continue to  Her vision has improved significantly, she drives but only on local roads  Thinking of starting a recovery house for women in [facility] with some other people  Not suicidal, doesn't know why she took extra meds  Having vivid dreams  Feels that she is in the right place right now  Is going to start physical therapy for pain   She is taking quetiapine 100 mg at HS  Continue naltrexone 50 mg one tab po qd; duloxetine 60 mg cap po BID; quetiapine 100 mg po qHS; trazodone 100 mg po qHS         She denies suicidal ideation, intent or plan at present, has no suicidal ideation, intent or plan at present  She denies any auditory hallucinations and visual hallucinations, denies any other delusional thinking, denies any delusional thinking  She denies any side effects from medications    HPI ROS Appetite Changes and Sleep: normal appetite, normal sleep    Review Of Systems:      Constitutional Negative   ENT Negative   Cardiovascular Negative   Respiratory Negative   Gastrointestinal Negative   Genitourinary Negative   Musculoskeletal Negative   Integumentary Negative   Neurological Negative   Endocrine Negative   Other Symptoms Negative and None       Laboratory Results: No results found for this or any previous visit      Substance Abuse History:    Social History     Substance and Sexual Activity   Drug Use Never       Family Psychiatric History:     Family History   Problem Relation Age of Onset    Heart disease Mother     Stroke Mother 58    COPD Mother     Arthritis Mother     Hypertension Father     Kidney disease Brother         kidney transplant    Multiple sclerosis Sister     No Known Problems Maternal Aunt     No Known Problems Maternal Uncle     No Known Problems Paternal Aunt     No Known Problems Paternal Uncle     No Known Problems Maternal Grandmother     No Known Problems Maternal Grandfather     No Known Problems Paternal Grandmother     No Known Problems Paternal Grandfather     ADD / ADHD Neg Hx     Anesthesia problems Neg Hx     Cancer Neg Hx     Clotting disorder Neg Hx     Collagen disease Neg Hx     Diabetes Neg Hx     Dislocations Neg Hx     Learning disabilities Neg Hx     Neurological problems Neg Hx     Osteoporosis Neg Hx     Rheumatologic disease Neg Hx     Scoliosis Neg Hx     Vascular Disease Neg Hx        The following portions of the patient's history were reviewed and updated as appropriate: past family history, past medical history, past social history, past surgical history and problem list     Social History     Socioeconomic History    Marital status: /Civil Union     Spouse name: Not on file    Number of children: Not on file    Years of education: Not on file    Highest education level: Not on file   Occupational History    Not on file   Tobacco Use    Smoking status: Never Smoker    Smokeless tobacco: Never Used   Vaping Use    Vaping Use: Never used   Substance and Sexual Activity    Alcohol use: Not Currently    Drug use: Never    Sexual activity: Not Currently   Other Topics Concern    Not on file   Social History Narrative    Not on file     Social Determinants of Health     Financial Resource Strain:     Difficulty of Paying Living Expenses:    Food Insecurity:     Worried About Running Out of Food in the Last Year:     Ran Out of Food in the Last Year:    Transportation Needs:     Lack of Transportation (Medical):      Lack of Transportation (Non-Medical):    Physical Activity:     Days of Exercise per Week:     Minutes of Exercise per Session:    Stress:     Feeling of Stress :    Social Connections:     Frequency of Communication with Friends and Family:     Frequency of Social Gatherings with Friends and Family:     Attends [employer] Services:     Active Member of Clubs or Organizations:     Attends Club or Organization Meetings:     Marital Status:    Intimate Partner Violence:     Fear of Current or Ex-Partner:     Emotionally Abused:     Physically Abused:     Sexually Abused:      Social History     Social History Narrative    Not on file        Social History     Tobacco History     Smoking Status  Never Smoker    Smokeless Tobacco Use  Never Used          Alcohol History     Alcohol Use Status  Not Currently          Drug Use     Drug Use Status  Never          Sexual Activity     Sexually Active  Not Currently          Activities of Daily Living    Not Asked                     OBJECTIVE:     Mental Status Evaluation:    Appearance age appropriate, casually dressed   Behavior pleasant, cooperative   Speech normal volume, normal pitch   Mood Stable, happy to be of help to step-hoa   Affect Mood-congruent   Thought Processes Coherent, organized, goal-directed   Associations intact associations   Thought Content normal   Perceptual Disturbances: none   Abnormal Thoughts  Risk Potential Suicidal ideation - None  Homicidal ideation - None  Potential for aggression - No   Orientation oriented to person, place, time/date and situation   Memory recent and remote memory grossly intact   Consciousness alert and awake   Attention Span attention span and concentration are age appropriate   Intellect Not formally assessed   Insight age appropriate    Judgement good    Muscle Strength and  Gait muscle strength and tone were normal   Language no difficulty naming common objects   Fund of Knowledge displays adequate knowledge of current events   Pain none   Pain Scale 0     The patient's chart was reviewed for relevant lab reports and recent encounters with other providers      Medications, treatment progress and treatment plan were reviewed with the patient  The treatment plan was updated with the patient who agreed with the updated plan      Assessment/Plan:       Diagnoses and all orders for this visit:    Major depressive disorder, recurrent, in partial remission (HCC)    Generalized anxiety disorder    Gambling disorder, moderate    Primary insomnia          Treatment Recommendations/Precautions:    Continue current medications:     duloxetine 60 mg capsule - take 1 capsule by mouth every day    duloxetine 30 mg capsule - take 1 capsule by mouth every day    Note: total daily dose of duloxetine  = 90 mg   quetiapine 100 mg tablet - take 1 tablet by mouth daily at bedtime   trazodone 100 mg tablet - take 1 tablet by mouth daily at bedtime     Risks/Benefits       Risks, Benefits And Possible Side Effects Of Medications:     Risks, benefits, and possible side effects of medications explained to patient and patient verbalizes understanding and agreement for treatment      Controlled Medication Discussion:      Not applicable

## 2021-07-29 ENCOUNTER — TELEPHONE (OUTPATIENT)
Dept: PSYCHIATRY | Facility: CLINIC | Age: 66
End: 2021-07-29

## 2021-07-29 ENCOUNTER — APPOINTMENT (EMERGENCY)
Dept: RADIOLOGY | Facility: HOSPITAL | Age: 66
End: 2021-07-29
Payer: COMMERCIAL

## 2021-07-29 ENCOUNTER — HOSPITAL ENCOUNTER (EMERGENCY)
Facility: HOSPITAL | Age: 66
Discharge: HOME | End: 2021-07-29
Attending: EMERGENCY MEDICINE | Admitting: EMERGENCY MEDICINE
Payer: COMMERCIAL

## 2021-07-29 VITALS
DIASTOLIC BLOOD PRESSURE: 65 MMHG | TEMPERATURE: 97.6 F | HEART RATE: 68 BPM | RESPIRATION RATE: 20 BRPM | SYSTOLIC BLOOD PRESSURE: 121 MMHG | OXYGEN SATURATION: 99 %

## 2021-07-29 DIAGNOSIS — R10.9 ABDOMINAL PAIN: Primary | ICD-10-CM

## 2021-07-29 LAB
ALBUMIN SERPL BCP-MCNC: 3.4 G/DL (ref 3.5–5)
ALP SERPL-CCNC: 82 U/L (ref 46–116)
ALT SERPL W P-5'-P-CCNC: 27 U/L (ref 12–78)
ANION GAP SERPL CALCULATED.3IONS-SCNC: 11 MMOL/L (ref 4–13)
APTT PPP: 28 SECONDS (ref 23–37)
AST SERPL W P-5'-P-CCNC: 19 U/L (ref 5–45)
ATRIAL RATE: 70 BPM
ATRIAL RATE: 71 BPM
BASOPHILS # BLD AUTO: 0.03 THOUSANDS/ΜL (ref 0–0.1)
BASOPHILS NFR BLD AUTO: 0 % (ref 0–1)
BILIRUB SERPL-MCNC: 0.22 MG/DL (ref 0.2–1)
BUN SERPL-MCNC: 16 MG/DL (ref 5–25)
CALCIUM ALBUM COR SERPL-MCNC: 9.1 MG/DL (ref 8.3–10.1)
CALCIUM SERPL-MCNC: 8.6 MG/DL (ref 8.3–10.1)
CHLORIDE SERPL-SCNC: 107 MMOL/L (ref 100–108)
CO2 SERPL-SCNC: 26 MMOL/L (ref 21–32)
CREAT SERPL-MCNC: 0.87 MG/DL (ref 0.6–1.3)
EOSINOPHIL # BLD AUTO: 0.18 THOUSAND/ΜL (ref 0–0.61)
EOSINOPHIL NFR BLD AUTO: 3 % (ref 0–6)
ERYTHROCYTE [DISTWIDTH] IN BLOOD BY AUTOMATED COUNT: 13.5 % (ref 11.6–15.1)
GFR SERPL CREATININE-BSD FRML MDRD: 71 ML/MIN/1.73SQ M
GLUCOSE SERPL-MCNC: 119 MG/DL (ref 65–140)
HCT VFR BLD AUTO: 38.5 % (ref 34.8–46.1)
HGB BLD-MCNC: 12.2 G/DL (ref 11.5–15.4)
IMM GRANULOCYTES # BLD AUTO: 0.02 THOUSAND/UL (ref 0–0.2)
IMM GRANULOCYTES NFR BLD AUTO: 0 % (ref 0–2)
INR PPP: 0.97 (ref 0.84–1.19)
LACTATE SERPL-SCNC: 1.1 MMOL/L (ref 0.5–2)
LACTATE SERPL-SCNC: 2.1 MMOL/L (ref 0.5–2)
LIPASE SERPL-CCNC: 74 U/L (ref 73–393)
LYMPHOCYTES # BLD AUTO: 0.92 THOUSANDS/ΜL (ref 0.6–4.47)
LYMPHOCYTES NFR BLD AUTO: 13 % (ref 14–44)
MCH RBC QN AUTO: 27.1 PG (ref 26.8–34.3)
MCHC RBC AUTO-ENTMCNC: 31.7 G/DL (ref 31.4–37.4)
MCV RBC AUTO: 86 FL (ref 82–98)
MONOCYTES # BLD AUTO: 0.45 THOUSAND/ΜL (ref 0.17–1.22)
MONOCYTES NFR BLD AUTO: 6 % (ref 4–12)
NEUTROPHILS # BLD AUTO: 5.73 THOUSANDS/ΜL (ref 1.85–7.62)
NEUTS SEG NFR BLD AUTO: 78 % (ref 43–75)
NRBC BLD AUTO-RTO: 0 /100 WBCS
P AXIS: 61 DEGREES
P AXIS: 73 DEGREES
PLATELET # BLD AUTO: 292 THOUSANDS/UL (ref 149–390)
PMV BLD AUTO: 10.6 FL (ref 8.9–12.7)
POTASSIUM SERPL-SCNC: 4.5 MMOL/L (ref 3.5–5.3)
PR INTERVAL: 168 MS
PR INTERVAL: 174 MS
PROT SERPL-MCNC: 7 G/DL (ref 6.4–8.2)
PROTHROMBIN TIME: 12.8 SECONDS (ref 11.6–14.5)
QRS AXIS: -13 DEGREES
QRS AXIS: -7 DEGREES
QRSD INTERVAL: 86 MS
QRSD INTERVAL: 88 MS
QT INTERVAL: 398 MS
QT INTERVAL: 406 MS
QTC INTERVAL: 432 MS
QTC INTERVAL: 438 MS
RBC # BLD AUTO: 4.5 MILLION/UL (ref 3.81–5.12)
SARS-COV-2 RNA RESP QL NAA+PROBE: NEGATIVE
SODIUM SERPL-SCNC: 144 MMOL/L (ref 136–145)
T WAVE AXIS: 14 DEGREES
T WAVE AXIS: 29 DEGREES
TROPONIN I SERPL-MCNC: <0.02 NG/ML
VENTRICULAR RATE: 70 BPM
VENTRICULAR RATE: 71 BPM
WBC # BLD AUTO: 7.33 THOUSAND/UL (ref 4.31–10.16)

## 2021-07-29 PROCEDURE — 85025 COMPLETE CBC W/AUTO DIFF WBC: CPT | Performed by: EMERGENCY MEDICINE

## 2021-07-29 PROCEDURE — 85730 THROMBOPLASTIN TIME PARTIAL: CPT | Performed by: EMERGENCY MEDICINE

## 2021-07-29 PROCEDURE — 74174 CTA ABD&PLVS W/CONTRAST: CPT

## 2021-07-29 PROCEDURE — 93005 ELECTROCARDIOGRAM TRACING: CPT

## 2021-07-29 PROCEDURE — 83605 ASSAY OF LACTIC ACID: CPT | Performed by: EMERGENCY MEDICINE

## 2021-07-29 PROCEDURE — 84484 ASSAY OF TROPONIN QUANT: CPT | Performed by: EMERGENCY MEDICINE

## 2021-07-29 PROCEDURE — U0005 INFEC AGEN DETEC AMPLI PROBE: HCPCS | Performed by: EMERGENCY MEDICINE

## 2021-07-29 PROCEDURE — 93010 ELECTROCARDIOGRAM REPORT: CPT | Performed by: INTERNAL MEDICINE

## 2021-07-29 PROCEDURE — 80053 COMPREHEN METABOLIC PANEL: CPT | Performed by: EMERGENCY MEDICINE

## 2021-07-29 PROCEDURE — U0003 INFECTIOUS AGENT DETECTION BY NUCLEIC ACID (DNA OR RNA); SEVERE ACUTE RESPIRATORY SYNDROME CORONAVIRUS 2 (SARS-COV-2) (CORONAVIRUS DISEASE [COVID-19]), AMPLIFIED PROBE TECHNIQUE, MAKING USE OF HIGH THROUGHPUT TECHNOLOGIES AS DESCRIBED BY CMS-2020-01-R: HCPCS | Performed by: EMERGENCY MEDICINE

## 2021-07-29 PROCEDURE — 36415 COLL VENOUS BLD VENIPUNCTURE: CPT | Performed by: EMERGENCY MEDICINE

## 2021-07-29 PROCEDURE — 96361 HYDRATE IV INFUSION ADD-ON: CPT

## 2021-07-29 PROCEDURE — 71045 X-RAY EXAM CHEST 1 VIEW: CPT

## 2021-07-29 PROCEDURE — 99285 EMERGENCY DEPT VISIT HI MDM: CPT

## 2021-07-29 PROCEDURE — 96375 TX/PRO/DX INJ NEW DRUG ADDON: CPT

## 2021-07-29 PROCEDURE — 71275 CT ANGIOGRAPHY CHEST: CPT

## 2021-07-29 PROCEDURE — 99285 EMERGENCY DEPT VISIT HI MDM: CPT | Performed by: EMERGENCY MEDICINE

## 2021-07-29 PROCEDURE — 83690 ASSAY OF LIPASE: CPT | Performed by: EMERGENCY MEDICINE

## 2021-07-29 PROCEDURE — 96374 THER/PROPH/DIAG INJ IV PUSH: CPT

## 2021-07-29 PROCEDURE — 85610 PROTHROMBIN TIME: CPT | Performed by: EMERGENCY MEDICINE

## 2021-07-29 RX ORDER — ONDANSETRON 2 MG/ML
4 INJECTION INTRAMUSCULAR; INTRAVENOUS ONCE
Status: COMPLETED | OUTPATIENT
Start: 2021-07-29 | End: 2021-07-29

## 2021-07-29 RX ORDER — MORPHINE SULFATE 4 MG/ML
4 INJECTION, SOLUTION INTRAMUSCULAR; INTRAVENOUS ONCE
Status: COMPLETED | OUTPATIENT
Start: 2021-07-29 | End: 2021-07-29

## 2021-07-29 RX ADMIN — ONDANSETRON 4 MG: 2 INJECTION INTRAMUSCULAR; INTRAVENOUS at 08:41

## 2021-07-29 RX ADMIN — FAMOTIDINE 20 MG: 10 INJECTION INTRAVENOUS at 08:43

## 2021-07-29 RX ADMIN — MORPHINE SULFATE 4 MG: 4 INJECTION INTRAVENOUS at 08:39

## 2021-07-29 RX ADMIN — IOHEXOL 100 ML: 350 INJECTION, SOLUTION INTRAVENOUS at 08:56

## 2021-07-29 RX ADMIN — SODIUM CHLORIDE 1000 ML: 0.9 INJECTION, SOLUTION INTRAVENOUS at 08:44

## 2021-07-29 NOTE — ED PROVIDER NOTES
History  Chief Complaint   Patient presents with    Abdominal Pain     patient c/o RUQ abdominal pain radiating around to her back starting around 0530 this morning  vomited x 1 after drinking coffee  denies nausea at this time  58yoF hx prior cholecystectomy c/o sudden onset epigastric pain radiating to the back and chest, constant, severe, associated with nausea  No black stools or blood in the stool  No prior episodes of this by report  Prior to Admission Medications   Prescriptions Last Dose Informant Patient Reported? Taking?    DULoxetine (CYMBALTA) 30 mg delayed release capsule   No No   Sig: Take 3 capsules (90 mg total) by mouth daily   Magnesium 400 MG TABS   Yes No   Sig: Take by mouth daily   QUEtiapine (SEROquel) 100 mg tablet   No No   Sig: Take 1 tablet (100 mg total) by mouth daily at bedtime   albuterol (PROVENTIL HFA,VENTOLIN HFA) 90 mcg/act inhaler   No No   Sig: Inhale 2 puffs every 4 (four) hours as needed for wheezing   atorvastatin (LIPITOR) 20 mg tablet   Yes No   Sig: Take 20 mg by mouth daily    baclofen 10 mg tablet   No No   Sig: Take 1 tablet (10 mg total) by mouth as needed for muscle spasms   gabapentin (NEURONTIN) 300 mg capsule   No No   Sig: Take 1 capsule (300 mg total) by mouth 3 (three) times a day   levothyroxine 50 mcg tablet   No No   Sig: take 1 tablet by mouth once daily   midodrine (PROAMATINE) 10 MG tablet   No No   Sig: Take 1 tablet (10 mg total) by mouth 3 (three) times a day   pantoprazole (PROTONIX) 40 mg tablet   No No   Sig: take 1 tablet by mouth once daily   traZODone (DESYREL) 100 mg tablet   No No   Sig: Take 1 tablet (100 mg total) by mouth daily at bedtime      Facility-Administered Medications: None       Past Medical History:   Diagnosis Date    Abdominal adhesions     Anesthesia complication     difficult to wake up    Anxiety     Depression     Depression     Disease of thyroid gland     Fibromyalgia     GERD (gastroesophageal reflux disease)     History of cholecystectomy 9/7/2019    Lazy eye     resolved: 3/27/17    Medical clearance for psychiatric admission 7/13/2021    Melanoma [facility]) 2010    Osteopenia     with joint pain-elevated DEV    Psychiatric disorder     Tinnitus     Wears glasses     for reading       Past Surgical History:   Procedure Laterality Date    ABDOMINAL SURGERY      lysis of adhesions x 2    APPENDECTOMY      CERVICAL FUSION      CHOLECYSTECTOMY      open    DILATION AND CURETTAGE OF UTERUS      NEUROMA EXCISION Right 5/26/2017    Procedure: EXCISION MASS / FIBROMA FOOT;  Surgeon: Mayra Fernández DPM;  Location: [address] [place] [address];  Service:     PARS PLANA VITRECTOMY W/ REPAIR OF MACULAR HOLE  12/23/2020    RIGHT OOPHORECTOMY      WISDOM TOOTH EXTRACTION      x4       Family History   Problem Relation Age of Onset    Heart disease Mother     Stroke Mother 58    COPD Mother     Arthritis Mother     Hypertension Father     Kidney disease Brother         kidney transplant    Multiple sclerosis Sister     No Known Problems Maternal Aunt     No Known Problems Maternal Uncle     No Known Problems Paternal Aunt     No Known Problems Paternal Uncle     No Known Problems Maternal Grandmother     No Known Problems Maternal Grandfather     No Known Problems Paternal Grandmother     No Known Problems Paternal Grandfather     ADD / ADHD Neg Hx     Anesthesia problems Neg Hx     Cancer Neg Hx     Clotting disorder Neg Hx     Collagen disease Neg Hx     Diabetes Neg Hx     Dislocations Neg Hx     Learning disabilities Neg Hx     Neurological problems Neg Hx     Osteoporosis Neg Hx     Rheumatologic disease Neg Hx     Scoliosis Neg Hx     Vascular Disease Neg Hx      I have reviewed and agree with the history as documented      E-Cigarette/Vaping    E-Cigarette Use Never User      E-Cigarette/Vaping Substances    Nicotine No     THC No     CBD No     Flavoring No     Other No     Unknown No Social History     Tobacco Use    Smoking status: Never Smoker    Smokeless tobacco: Never Used   Vaping Use    Vaping Use: Never used   Substance Use Topics    Alcohol use: Not Currently    Drug use: Never       Review of Systems   Constitutional: Negative for fever  Respiratory: Negative for cough  Gastrointestinal: Positive for abdominal pain  Musculoskeletal: Positive for back pain  Neurological: Negative for headaches  All other systems reviewed and are negative  Physical Exam  Physical Exam  Vitals reviewed  Constitutional:       General: She is in acute distress  Appearance: She is well-developed  She is ill-appearing  HENT:      Head: Normocephalic and atraumatic  Right Ear: External ear normal       Left Ear: External ear normal       Nose: Nose normal  No rhinorrhea  Mouth/Throat:      Mouth: Mucous membranes are moist    Eyes:      Conjunctiva/sclera: Conjunctivae normal    Cardiovascular:      Rate and Rhythm: Normal rate and regular rhythm  Pulmonary:      Effort: Pulmonary effort is normal       Breath sounds: Normal breath sounds  No wheezing or rales  Abdominal:      Palpations: Abdomen is soft  Tenderness: There is abdominal tenderness in the epigastric area  There is guarding  Musculoskeletal:      Cervical back: Neck supple  Right lower leg: No edema  Left lower leg: No edema  Skin:     General: Skin is warm and dry  Neurological:      Mental Status: She is alert and oriented to person, place, and time     Psychiatric:         Mood and Affect: Mood normal          Vital Signs  ED Triage Vitals [07/29/21 0816]   Temperature Pulse Respirations Blood Pressure SpO2   97 6 °F (36 4 °C) 74 19 133/65 99 %      Temp Source Heart Rate Source Patient Position - Orthostatic VS BP Location FiO2 (%)   Tympanic Monitor Lying Right arm --      Pain Score       9           Vitals:    07/29/21 0816 07/29/21 0830 07/29/21 1031 07/29/21 1104   BP: 133/65 131/69 132/75 121/65   Pulse: 74 70 74 68   Patient Position - Orthostatic VS: Lying  Sitting Sitting         Visual Acuity      ED Medications  Medications   sodium chloride 0 9 % bolus 1,000 mL (0 mL Intravenous Stopped 7/29/21 1020)   morphine (PF) 4 mg/mL injection 4 mg (4 mg Intravenous Given 7/29/21 0839)   ondansetron (ZOFRAN) injection 4 mg (4 mg Intravenous Given 7/29/21 0841)   famotidine (PEPCID) injection 20 mg (20 mg Intravenous Given 7/29/21 0843)   iohexol (OMNIPAQUE) 350 MG/ML injection (SINGLE-DOSE) 100 mL (100 mL Intravenous Given 7/29/21 0856)       Diagnostic Studies  Results Reviewed     Procedure Component Value Units Date/Time    Lactic acid 2 Hours [[id number]]  (Normal) Collected: 07/29/21 1031    Lab Status: Final result Specimen: Blood from Arm, Left Updated: 07/29/21 1101     LACTIC ACID 1 1 mmol/L     Narrative:      Result may be elevated if tourniquet was used during collection  Novel Coronavirus (Covid-19),PCR [facility] - 2 Hour Stat [[id number]]  (Normal) Collected: 07/29/21 0903    Lab Status: Final result Specimen: Nares from Nose Updated: 07/29/21 1006     SARS-CoV-2 Negative    Narrative: The specimen collection materials, transport medium, and/or testing methodology utilized in the production of these test results have been proven to be reliable in a limited validation with an abbreviated program under the Emergency Utilization Authorization provided by the FDA  Testing reported as "Presumptive positive" will be confirmed with secondary testing to ensure result accuracy  Clinical caution and judgement should be used with the interpretation of these results with consideration of the clinical impression and other laboratory testing  Testing reported as "Positive" or "Negative" has been proven to be accurate according to standard laboratory validation requirements  All testing is performed with control materials showing appropriate reactivity at standard intervals  Lactic acid [[id number]]  (Abnormal) Collected: 07/29/21 0839    Lab Status: Final result Specimen: Blood from Arm, Left Updated: 07/29/21 0925     LACTIC ACID 2 1 mmol/L     Narrative:      Result may be elevated if tourniquet was used during collection      Troponin I [[id number]]  (Normal) Collected: 07/29/21 0839    Lab Status: Final result Specimen: Blood from Arm, Left Updated: 07/29/21 0919     Troponin I <0 02 ng/mL     Comprehensive metabolic panel [[id number]]  (Abnormal) Collected: 07/29/21 0839    Lab Status: Final result Specimen: Blood from Arm, Left Updated: 07/29/21 0914     Sodium 144 mmol/L      Potassium 4 5 mmol/L      Chloride 107 mmol/L      CO2 26 mmol/L      ANION GAP 11 mmol/L      BUN 16 mg/dL      Creatinine 0 87 mg/dL      Glucose 119 mg/dL      Calcium 8 6 mg/dL      Corrected Calcium 9 1 mg/dL      AST 19 U/L      ALT 27 U/L      Alkaline Phosphatase 82 U/L      Total Protein 7 0 g/dL      Albumin 3 4 g/dL      Total Bilirubin 0 22 mg/dL      eGFR 71 ml/min/1 73sq m     Narrative:      [place] guidelines for Chronic Kidney Disease (CKD):     Stage 1 with normal or high GFR (GFR > 90 mL/min/1 73 square meters)    Stage 2 Mild CKD (GFR = 60-89 mL/min/1 73 square meters)    Stage 3A Moderate CKD (GFR = 45-59 mL/min/1 73 square meters)    Stage 3B Moderate CKD (GFR = 30-44 mL/min/1 73 square meters)    Stage 4 Severe CKD (GFR = 15-29 mL/min/1 73 square meters)    Stage 5 End Stage CKD (GFR <15 mL/min/1 73 square meters)  Note: GFR calculation is accurate only with a steady state creatinine    Lipase [[id number]]  (Normal) Collected: 07/29/21 0839    Lab Status: Final result Specimen: Blood from Arm, Left Updated: 07/29/21 0914     Lipase 74 u/L     Protime-INR [[id number]]  (Normal) Collected: 07/29/21 0839    Lab Status: Final result Specimen: Blood from Arm, Left Updated: 07/29/21 0909     Protime 12 8 seconds      INR 0 97    APTT [[id number]]  (Normal) Collected: 07/29/21 0839    Lab Status: Final result Specimen: Blood from Arm, Left Updated: 07/29/21 0909     PTT 28 seconds     CBC and differential [[id number]]  (Abnormal) Collected: 07/29/21 0839    Lab Status: Final result Specimen: Blood from Arm, Left Updated: 07/29/21 0853     WBC 7 33 Thousand/uL      RBC 4 50 Million/uL      Hemoglobin 12 2 g/dL      Hematocrit 38 5 %      MCV 86 fL      MCH 27 1 pg      MCHC 31 7 g/dL      RDW 13 5 %      MPV 10 6 fL      Platelets 980 Thousands/uL      nRBC 0 /100 WBCs      Neutrophils Relative 78 %      Immat GRANS % 0 %      Lymphocytes Relative 13 %      Monocytes Relative 6 %      Eosinophils Relative 3 %      Basophils Relative 0 %      Neutrophils Absolute 5 73 Thousands/µL      Immature Grans Absolute 0 02 Thousand/uL      Lymphocytes Absolute 0 92 Thousands/µL      Monocytes Absolute 0 45 Thousand/µL      Eosinophils Absolute 0 18 Thousand/µL      Basophils Absolute 0 03 Thousands/µL                  CTA dissection protocol chest abdomen pelvis w wo contrast   Final Result by Phi Persaud MD (07/29 4615)      No aortic dissection, pulmonary embolus, or other acute CT abnormality to account for the patient's pain  Splenic artery aneurysm, 5 mm  Borderline mediastinal and right hilar nodes are of unlikely clinical significance, especially the contrast the largest borderline right hilar node is unchanged at least back as October 2020  Slightly larger than typical volume of stool in the cecum and ascending colon suggests the possibility of right-sided constipation  Workstation performed: [id number]         XR chest 1 view portable   Final Result by Lee Birmingham MD (07/29 2841)      No focal airspace consolidation identified                    Workstation performed: [id number]                    Procedures  Procedures         ED Course                                           MDM  Number of Diagnoses or Management Options  Abdominal pain  Diagnosis management comments: Pt seen immediately for severe abd pain / CP  EKG with slight ST depression versus repol inferiorly  Repeat unchanged, likely repol d/t LVH  CTA chest dissection protocol ordered and negative  Pain improved with meds  Suspect GERD / constipation related pain, discussed conservative care at home and return for worsening        Disposition  Final diagnoses:   Abdominal pain     Time reflects when diagnosis was documented in both MDM as applicable and the Disposition within this note     Time User Action Codes Description Comment    7/29/2021 11:05 AM Geri faby Add [R10 9] Abdominal pain       ED Disposition     ED Disposition Condition Date/Time Comment    Discharge Stable Thu Jul 29, 2021 11:05 AM Katie Morrow discharge to home/self care  Follow-up Information     Follow up With Specialties Details Why Αμαλίας MD [facility] In 1 week  [address] [address]  [phone]            Discharge Medication List as of 7/29/2021 11:07 AM      CONTINUE these medications which have NOT CHANGED    Details   albuterol (PROVENTIL HFA,VENTOLIN HFA) 90 mcg/act inhaler Inhale 2 puffs every 4 (four) hours as needed for wheezing, Starting Mon 7/6/2020, Normal      atorvastatin (LIPITOR) 20 mg tablet Take 20 mg by mouth daily , Starting Thu 11/21/2019, Historical Med      baclofen 10 mg tablet Take 1 tablet (10 mg total) by mouth as needed for muscle spasms, Starting Tue 7/20/2021, No Print      DULoxetine (CYMBALTA) 30 mg delayed release capsule Take 3 capsules (90 mg total) by mouth daily, Starting Wed 7/21/2021, Until Fri 8/20/2021, Normal      gabapentin (NEURONTIN) 300 mg capsule Take 1 capsule (300 mg total) by mouth 3 (three) times a day, Starting Tue 7/20/2021, Until Thu 8/19/2021, Normal      levothyroxine 50 mcg tablet take 1 tablet by mouth once daily, Normal      Magnesium 400 MG TABS Take by mouth daily, Historical Med      midodrine (PROAMATINE) 10 MG tablet Take 1 tablet (10 mg total) by mouth 3 (three) times a day, Starting Wed 3/10/2021, Normal      pantoprazole (PROTONIX) 40 mg tablet take 1 tablet by mouth once daily, Normal      QUEtiapine (SEROquel) 100 mg tablet Take 1 tablet (100 mg total) by mouth daily at bedtime, Starting Tue 7/20/2021, Until Thu 8/19/2021, Normal      traZODone (DESYREL) 100 mg tablet Take 1 tablet (100 mg total) by mouth daily at bedtime, Starting Tue 7/20/2021, Until Thu 8/19/2021, Normal           No discharge procedures on file      PDMP Review       Value Time User    PDMP Reviewed  Yes 7/19/2021  2:00 PM Tatiana Armas DO          ED Provider  Electronically Signed by           Sammuel Schaumann, DO  07/29/21 6124

## 2021-07-29 NOTE — DISCHARGE INSTRUCTIONS
Your CT scan was normal other than mild constipation  Your cardiac testing was normal   Please see your doctor in follow-up within 1 week, return to the ER for worsening

## 2021-08-03 ENCOUNTER — OFFICE VISIT (OUTPATIENT)
Dept: FAMILY MEDICINE CLINIC | Facility: CLINIC | Age: 66
End: 2021-08-03
Payer: COMMERCIAL

## 2021-08-03 VITALS
BODY MASS INDEX: 27.66 KG/M2 | OXYGEN SATURATION: 97 % | WEIGHT: 162 LBS | SYSTOLIC BLOOD PRESSURE: 120 MMHG | TEMPERATURE: 97.5 F | RESPIRATION RATE: 16 BRPM | HEART RATE: 73 BPM | DIASTOLIC BLOOD PRESSURE: 66 MMHG | HEIGHT: 64 IN

## 2021-08-03 DIAGNOSIS — R07.9 CHEST PAIN, UNSPECIFIED TYPE: Primary | ICD-10-CM

## 2021-08-03 DIAGNOSIS — Z12.31 ENCOUNTER FOR SCREENING MAMMOGRAM FOR MALIGNANT NEOPLASM OF BREAST: ICD-10-CM

## 2021-08-03 DIAGNOSIS — Z11.1 ENCOUNTER FOR PPD TEST: ICD-10-CM

## 2021-08-03 DIAGNOSIS — F33.2 SEVERE EPISODE OF RECURRENT MAJOR DEPRESSIVE DISORDER, WITHOUT PSYCHOTIC FEATURES (HCC): ICD-10-CM

## 2021-08-03 DIAGNOSIS — K21.00 GASTROESOPHAGEAL REFLUX DISEASE WITH ESOPHAGITIS WITHOUT HEMORRHAGE: ICD-10-CM

## 2021-08-03 DIAGNOSIS — I95.1 ORTHOSTATIC HYPOTENSION: ICD-10-CM

## 2021-08-03 PROBLEM — R10.9 ABDOMINAL PAIN: Status: RESOLVED | Noted: 2020-10-23 | Resolved: 2021-08-03

## 2021-08-03 PROCEDURE — 3008F BODY MASS INDEX DOCD: CPT | Performed by: NURSE PRACTITIONER

## 2021-08-03 PROCEDURE — 1036F TOBACCO NON-USER: CPT | Performed by: NURSE PRACTITIONER

## 2021-08-03 PROCEDURE — 99214 OFFICE O/P EST MOD 30 MIN: CPT | Performed by: NURSE PRACTITIONER

## 2021-08-03 PROCEDURE — 86580 TB INTRADERMAL TEST: CPT

## 2021-08-03 RX ORDER — OXYCODONE HYDROCHLORIDE AND ACETAMINOPHEN 5; 325 MG/1; MG/1
1 TABLET ORAL EVERY 6 HOURS PRN
COMMUNITY
Start: 2021-05-23 | End: 2021-11-18

## 2021-08-03 RX ORDER — IBUPROFEN 200 MG
1250 CAPSULE ORAL
COMMUNITY

## 2021-08-03 RX ORDER — PSEUDOEPHEDRINE HCL 30 MG
100 TABLET ORAL EVERY OTHER DAY
COMMUNITY
End: 2021-11-30

## 2021-08-03 RX ORDER — CLINDAMYCIN HYDROCHLORIDE 150 MG/1
CAPSULE ORAL
COMMUNITY
Start: 2021-07-20 | End: 2021-11-05

## 2021-08-03 RX ORDER — ALPRAZOLAM 0.5 MG/1
0.5 TABLET ORAL 2 TIMES DAILY PRN
COMMUNITY
Start: 2021-05-23

## 2021-08-03 RX ORDER — ASPIRIN 81 MG/1
81 TABLET ORAL
COMMUNITY
End: 2021-11-18

## 2021-08-03 NOTE — PROGRESS NOTES
Assessment/Plan:    1  Chest pain, unspecified type    2  Encounter for screening mammogram for malignant neoplasm of breast  -     Mammo screening bilateral w 3d & cad; Future; Expected date: 08/03/2021    3  Gastroesophageal reflux disease with esophagitis without hemorrhage  Assessment & Plan:  Continue PPI  Consider adding Pepcid if symptoms continue  4  Encounter for PPD test  -     TB Skin Test    5  Severe episode of recurrent major depressive disorder, without psychotic features [facility])  Assessment & Plan:  Sees psychiatry, doing well        6  Orthostatic hypotension  Assessment & Plan:  Sees cardiology, continue Midodrine            There are no Patient Instructions on file for this visit  Return in about 2 days (around 8/5/2021) for nurse visit PPD read  Subjective:      Patient ID: Gil Richardson is a 60 y o  female  Chief Complaint   Patient presents with    Follow-up     post ER visit due to abdominal pain, [id number]        Following up today on recent ER visit  She was evaluated for chest pain, cardiac workup was negative  Thinks pain resolved after she left the ER, was given Morphine  She has not had any recurrent pain or symptoms  Reports she has had more acid reflux than normal, despite being on PPI  No recent dietary changes or triggering events  Sees Dr Emmy Gurrola, endoscopy and colonoscopy up to date  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  Recently hospitalized for mental health, doing well now  Sees psychiatry routinely and will be resuming counseling  The following portions of the patient's history were reviewed and updated as appropriate: allergies, current medications, past family history, past medical history, past social history, past surgical history and problem list     Review of Systems   Constitutional: Negative for chills, fatigue and fever  Respiratory: Negative for cough, shortness of breath and wheezing      Cardiovascular: Negative for chest pain (resolved), palpitations and leg swelling  Gastrointestinal: Negative for abdominal pain, diarrhea, nausea and vomiting  See HPI   Skin: Negative for rash  Neurological: Negative for dizziness and headaches           Current Outpatient Medications   Medication Sig Dispense Refill    albuterol (PROVENTIL HFA,VENTOLIN HFA) 90 mcg/act inhaler Inhale 2 puffs every 4 (four) hours as needed for wheezing 1 Inhaler 0    ALPRAZolam (XANAX) 0 5 mg tablet Take 0 5 mg by mouth 2 (two) times a day as needed      atorvastatin (LIPITOR) 20 mg tablet Take 20 mg by mouth daily   0    baclofen 10 mg tablet Take 1 tablet (10 mg total) by mouth as needed for muscle spasms      calcium carbonate (OS-WILLY) 1250 (500 Ca) MG tablet Take 1,250 mg by mouth      Docusate Sodium (DSS) 100 MG CAPS Take 100 mg by mouth every other day      DULoxetine (CYMBALTA) 30 mg delayed release capsule Take 3 capsules (90 mg total) by mouth daily 90 capsule 0    gabapentin (NEURONTIN) 300 mg capsule Take 1 capsule (300 mg total) by mouth 3 (three) times a day 90 capsule 0    levothyroxine 50 mcg tablet take 1 tablet by mouth once daily 90 tablet 0    Magnesium 400 MG TABS Take by mouth daily      midodrine (PROAMATINE) 10 MG tablet Take 1 tablet (10 mg total) by mouth 3 (three) times a day 90 tablet 2    pantoprazole (PROTONIX) 40 mg tablet take 1 tablet by mouth once daily 60 tablet 2    QUEtiapine (SEROquel) 100 mg tablet Take 1 tablet (100 mg total) by mouth daily at bedtime 30 tablet 0    traZODone (DESYREL) 100 mg tablet Take 1 tablet (100 mg total) by mouth daily at bedtime 30 tablet 0    aspirin (ECOTRIN LOW STRENGTH) 81 mg EC tablet Take 81 mg by mouth (Patient not taking: Reported on 8/3/2021)      clindamycin (CLEOCIN) 150 mg capsule take 1 capsule by mouth every 8 hours until finished (Patient not taking: Reported on 8/3/2021)      oxyCODONE-acetaminophen (PERCOCET) 5-325 mg per tablet Take 1 tablet by mouth every 6 (six) hours as needed (Patient not taking: Reported on 8/3/2021)       No current facility-administered medications for this visit  Objective:    /66   Pulse 73   Temp 97 5 °F (36 4 °C)   Resp 16   Ht 5' 4" (1 626 m)   Wt 73 5 kg (162 lb)   LMP  (LMP Unknown)   SpO2 97%   BMI 27 81 kg/m²        Physical Exam  Vitals and nursing note reviewed  Constitutional:       Appearance: Normal appearance  She is well-developed and normal weight  HENT:      Head: Normocephalic and atraumatic  Right Ear: Tympanic membrane, ear canal and external ear normal       Left Ear: Tympanic membrane, ear canal and external ear normal       Nose: No mucosal edema or rhinorrhea  Mouth/Throat:      Pharynx: Uvula midline  Eyes:      Conjunctiva/sclera: Conjunctivae normal    Neck:      Thyroid: No thyromegaly  Cardiovascular:      Rate and Rhythm: Normal rate and regular rhythm  Heart sounds: Normal heart sounds  No murmur heard  Pulmonary:      Effort: Pulmonary effort is normal       Breath sounds: Normal breath sounds  Abdominal:      General: Bowel sounds are normal  There is no distension  Palpations: Abdomen is soft  There is no hepatomegaly or splenomegaly  Tenderness: There is no abdominal tenderness  Musculoskeletal:      Cervical back: Neck supple  No edema  Lymphadenopathy:      Cervical:      Right cervical: No superficial cervical adenopathy  Left cervical: No superficial cervical adenopathy  Skin:     General: Skin is warm and dry  Capillary Refill: Capillary refill takes less than 2 seconds  Findings: No rash  Neurological:      Mental Status: She is alert     Psychiatric:         Mood and Affect: Mood normal                 Anamika Grade, CRNP

## 2021-08-05 ENCOUNTER — TELEPHONE (OUTPATIENT)
Dept: PSYCHIATRY | Facility: CLINIC | Age: 66
End: 2021-08-05

## 2021-08-05 ENCOUNTER — CLINICAL SUPPORT (OUTPATIENT)
Dept: FAMILY MEDICINE CLINIC | Facility: CLINIC | Age: 66
End: 2021-08-05

## 2021-08-05 DIAGNOSIS — Z11.1 ENCOUNTER FOR PPD SKIN TEST READING: Primary | ICD-10-CM

## 2021-08-05 LAB
INDURATION: 0 MM
TB SKIN TEST: NEGATIVE

## 2021-08-16 DIAGNOSIS — F33.41 MAJOR DEPRESSIVE DISORDER, RECURRENT, IN PARTIAL REMISSION (HCC): Primary | ICD-10-CM

## 2021-08-16 DIAGNOSIS — M79.7 FIBROMYALGIA: ICD-10-CM

## 2021-08-16 DIAGNOSIS — F33.2 SEVERE EPISODE OF RECURRENT MAJOR DEPRESSIVE DISORDER, WITHOUT PSYCHOTIC FEATURES (HCC): ICD-10-CM

## 2021-08-16 RX ORDER — TRAZODONE HYDROCHLORIDE 100 MG/1
100 TABLET ORAL
Qty: 30 TABLET | Refills: 0 | Status: SHIPPED | OUTPATIENT
Start: 2021-08-16 | End: 2021-09-27 | Stop reason: SDUPTHER

## 2021-08-16 RX ORDER — DULOXETIN HYDROCHLORIDE 60 MG/1
60 CAPSULE, DELAYED RELEASE ORAL DAILY
Qty: 30 CAPSULE | Refills: 0 | Status: SHIPPED | OUTPATIENT
Start: 2021-08-16 | End: 2021-09-16 | Stop reason: SDUPTHER

## 2021-08-16 RX ORDER — DULOXETIN HYDROCHLORIDE 30 MG/1
30 CAPSULE, DELAYED RELEASE ORAL DAILY
Qty: 30 CAPSULE | Refills: 0 | Status: SHIPPED | OUTPATIENT
Start: 2021-08-16 | End: 2021-09-16 | Stop reason: SDUPTHER

## 2021-08-16 RX ORDER — DULOXETIN HYDROCHLORIDE 60 MG/1
60 CAPSULE, DELAYED RELEASE ORAL DAILY
COMMUNITY
End: 2021-08-16 | Stop reason: SDUPTHER

## 2021-08-17 ENCOUNTER — SOCIAL WORK (OUTPATIENT)
Dept: BEHAVIORAL/MENTAL HEALTH CLINIC | Facility: CLINIC | Age: 66
End: 2021-08-17
Payer: COMMERCIAL

## 2021-08-17 DIAGNOSIS — F33.2 MAJOR DEPRESSIVE DISORDER, RECURRENT SEVERE WITHOUT PSYCHOTIC FEATURES (HCC): Primary | ICD-10-CM

## 2021-08-17 PROCEDURE — 90791 PSYCH DIAGNOSTIC EVALUATION: CPT | Performed by: SOCIAL WORKER
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
## 2021-08-18 NOTE — BH TREATMENT PLAN
Wilbur Haro  1955       Date of Initial Treatment Plan: 8/17/21   Date of Current Treatment Plan: 8/17/21    Treatment Plan Number 1     Strengths/Personal Resources for Self Care: "Connected with self help, have insight"    Diagnosis:   1  Major depressive disorder, recurrent severe without psychotic features ([facility] [address] )         Area of Needs: Symptom stabilization, coping with trauma symptoms      Long Term Goal 1: "Stay out of the hospital"  Target Date: 2/17/22  Completion Date: N/A         Short Term Objectives for Goal 1: Acknowledge triggers and signs of early warning of decompensation, develop and practice positive coping strategies to help mitigate daily stress, take psychotropic medication as prescribed, communicate effectively with all behavioral health treatment plan providers    Long Term Goal 2: "Deal with my trauma"  Target Date: 2/17/22  Completion Date: N/A    Short Term Objectives for Goal 2: Identify residual effects of trauma and connect how this relates to current day behaviors and decisions, identify unmet needs, develop and practice positive coping which instills a sense of safety and calm    GOAL 1: Modality: Individual 4x per month   Completion Date 2/17/22    GOAL 2: Modality: Individual 4x per month   Completion Date 2/17/22     Behavioral Health Treatment Plan [facility]: Diagnosis and Treatment Plan explained to Eloina Alonzo relates understanding diagnosis and is agreeable to Treatment Plan  Client Comments : Please share your thoughts, feelings, need and/or experiences regarding your treatment plan: "I'm looking forward to the work"  This treatment plan was created between this clinician and this client on 8/17/21  Due to the current COVID-19 precautions, a physical signature was not obtained  The client provided verbal consent for this treatment plan

## 2021-08-23 DIAGNOSIS — E03.8 OTHER SPECIFIED HYPOTHYROIDISM: ICD-10-CM

## 2021-08-23 RX ORDER — LEVOTHYROXINE SODIUM 0.05 MG/1
TABLET ORAL
Qty: 90 TABLET | Refills: 0 | Status: SHIPPED | OUTPATIENT
Start: 2021-08-23 | End: 2021-11-22 | Stop reason: SDUPTHER

## 2021-08-24 ENCOUNTER — SOCIAL WORK (OUTPATIENT)
Dept: BEHAVIORAL/MENTAL HEALTH CLINIC | Facility: CLINIC | Age: 66
End: 2021-08-24
Payer: COMMERCIAL

## 2021-08-24 DIAGNOSIS — F33.2 MAJOR DEPRESSIVE DISORDER, RECURRENT SEVERE WITHOUT PSYCHOTIC FEATURES (HCC): Primary | ICD-10-CM

## 2021-08-24 PROCEDURE — 90834 PSYTX W PT 45 MINUTES: CPT | Performed by: SOCIAL WORKER

## 2021-08-24 NOTE — PSYCH
This note was not shared with the patient due to this is a psychotherapy note    Psychotherapy Provided: Individual Psychotherapy 60 minutes     Length of time in session: 60 minutes, follow up in 1 week    Encounter Diagnosis     ICD-10-CM    1  Major depressive disorder, recurrent severe without psychotic features ([facility] [address] )  F33 2        Goals addressed in session: Goal 1 and Goal 2     Pain:      none    0    Current suicide risk : Low     DATA: Met with Dawit Ontiveros for scheduled individual session  Topics of discussion included current stressors, plans to manage stressors/urges to hidalgo while at a convention this weekend, family dynamicas, unmet emotional needs, and strategies to meet the needs directly    Client shows evidence of utilizing CBT thought record, Mindfulness-based strategies, weighing pros and cons and emotion regulation skills skills to manage mental health symptoms  During this session, this clinician used the following therapeutic modalities: engagement strategies, supportive psychotherapy, client-centered therapy, mindfulness-based strategies and CBT techniques  Deborahcompleted the PHQ-9 during the session  This screening is attached to this enounter       ASSESSMENT: Dawit Ontiveros presents with a less anxious, less depressed mood  Her affect is normal range and intensity, appropriate  Dawit Ontiveros exhibits early engagement with this clinician  Dawit Ontiveros continues to exhibit willingness to work on treatment goals and objectives  Dawit Ontiveros presents with a minimal risk of suicide, minimal risk of self-harm, and minimal risk of harm to others, as Client denies current SI/HI at time of visit  PLAN: Dawit Ontiveros will return in 1 week for the next scheduled session  Between sessions, Dawit Ontiveros will communicate her needs to her friends and [facility] sponsor and will report back during the next session re: successes and barriers   At the next session, this clinician will use engagement strategies, supportive psychotherapy, client-centered therapy, mindfulness-based strategies, CBT techniques and DBT-informed skills to address resentments, redirected anger, in an effort to assist Kenyon Stevenson with meeting treatment goals  Behavioral Health Treatment Plan [facility]: Diagnosis and Treatment Plan explained to Bryanna Nieto relates understanding diagnosis and is agreeable to Treatment Plan   Yes

## 2021-08-25 ENCOUNTER — OFFICE VISIT (OUTPATIENT)
Dept: PSYCHIATRY | Facility: CLINIC | Age: 66
End: 2021-08-25
Payer: COMMERCIAL

## 2021-08-25 VITALS — BODY MASS INDEX: 28 KG/M2 | WEIGHT: 164 LBS | HEIGHT: 64 IN

## 2021-08-25 DIAGNOSIS — F63.0 GAMBLING DISORDER, MODERATE: ICD-10-CM

## 2021-08-25 DIAGNOSIS — F51.01 PRIMARY INSOMNIA: ICD-10-CM

## 2021-08-25 DIAGNOSIS — F33.41 MAJOR DEPRESSIVE DISORDER, RECURRENT, IN PARTIAL REMISSION (HCC): Primary | ICD-10-CM

## 2021-08-25 DIAGNOSIS — F41.1 GENERALIZED ANXIETY DISORDER: ICD-10-CM

## 2021-08-25 PROCEDURE — 99214 OFFICE O/P EST MOD 30 MIN: CPT | Performed by: NURSE PRACTITIONER

## 2021-08-25 PROCEDURE — 90833 PSYTX W PT W E/M 30 MIN: CPT | Performed by: NURSE PRACTITIONER

## 2021-08-25 PROCEDURE — 3008F BODY MASS INDEX DOCD: CPT | Performed by: NURSE PRACTITIONER

## 2021-08-25 NOTE — PATIENT INSTRUCTIONS
Decrease trazodone 100 mg 1 tab by mouth daily at bedtime to 1/2 tablet = 50 mg) at bedtime  Continue current medications:                duloxetine 60 mg capsule - take 1 capsule by mouth every day               duloxetine 30 mg capsule - take 1 capsule by mouth every day               Note: total daily dose of duloxetine  = 90 mg              quetiapine 100 mg tablet - take 1 tablet by mouth daily at bedtime    Continue to see Thomas Guevara LCSW for psychotherapy

## 2021-08-25 NOTE — PSYCH
This note was not shared with the patient due to privacy exception: note includes other individuals    Scott      Name and Date of Birth:  Jessy Plunkett 60 y o  1955    Date of Visit: 08/25/21    This patient was seen in the office today for medication management and psychotherapy  COVID-19 precautions were followed at all times: masks were worn by patient and provider, goggles were worn by provider, social distancing was maintained, hand-washing was performed before and after appointment, pts seat and providers electronic signature device and stylus were cleaned with germicidal disposable wipes according to product instructions, and room was ventilated before pt entered the room and after pt left it  Time spent on psychotherapy: 17 minutes    Treatment modality: medication management; medication education; supportive psychotherapy re  questioning her medications and psychotherapy, reinforce adaptive behavior e g  improved sleep habits, avoiding gambling      SUBJECTIVE: PHQ-9 score = 3  Pt reports that last night she applied something she and her psychotherapist had discussed and the outcome was very good  She is going to a [employer] in [place] for a [employer] this weekend and is concerned that she will be tempted to hidalgo, but in psychotherapy she has learned things she can do to overcome the temptation; 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  She is being more deliberate in staying up when [employer] for work in the morning; that is her most productive time of the day, and she is happy that she stays up instead of going back to sleep for a couple of hours  When she wakes up she is groggy for a while; she takes trazodone and quetiapine as prescribed, at    Her  told her that he thinks she takes too man medications, saying that he just takes melatonin and sleeps fine, and he was also surprised when she referred to being in psychotherapy and questioned her need for it      7/26/2021: pt says that she was running out of duloxetine, and was leaving voicemail messages in [facility]'s prescription refill request mailbox, left three messages there, none were returned or answered [NB: voice mailbox is inactive] and she did not have duloxetine for three weeks, maybe felt a little abandoned by this writer and rejected by [facility]  She was distraught and felt suicidal, went to [facility] ED and was admitted into [facility] inpatient psych unit and discharged to home on 7/20  The physician that she saw while inpatient increased duloxetine to 90 mg per day  Prescriptions for all of her medications there were sent to her pharmacy, and she does not need refills of any today  Pt says that she does not want to see a different provider, but would like to see a psychotherapist  She was in therapy for 12 weeks for gambling, although she says that it touched on general life issues and did not focus only on gambling  She says that she has not gambled since 12/20/2020, and that 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  She says that the immediate trigger for her suicidality was that she had been turned down for a job as  for [employer]; she had three interviews and thought they went well and that she would he hired, but eventually was not, and she was very disappointed; she says that her self image and self-worth are dependent on working  She says also that Father's Day was a very disturbing day, her  was upset, her mother's presence in their house was problematic for pt and her , and it culminated in an argument  She denies suicidal ideation, intent or plan at present, has no suicidal ideation, intent or plan at present      She denies any auditory hallucinations and visual hallucinations, denies any other delusional thinking, denies any delusional thinking  She denies any side effects from medications    HPI ROS Appetite Changes and Sleep: usually normal appetite - occasionally overeats, normal sleep - improved sleep habits    Review Of Systems:      Constitutional Negative   ENT Negative   Cardiovascular Negative   Respiratory Negative   Gastrointestinal Negative   Genitourinary Negative   Musculoskeletal Negative   Integumentary Negative   Neurological Negative   Endocrine Negative   Other Symptoms Negative and None       Laboratory Results: No results found for this or any previous visit      Substance Abuse History:    Social History     Substance and Sexual Activity   Drug Use Never       Family Psychiatric History:     Family History   Problem Relation Age of Onset    Heart disease Mother     Stroke Mother 58    COPD Mother     Arthritis Mother     Hypertension Father     Kidney disease Brother         kidney transplant    Multiple sclerosis Sister     No Known Problems Maternal Aunt     No Known Problems Maternal Uncle     No Known Problems Paternal Aunt     No Known Problems Paternal Uncle     No Known Problems Maternal Grandmother     No Known Problems Maternal Grandfather     No Known Problems Paternal Grandmother     No Known Problems Paternal Grandfather     ADD / ADHD Neg Hx     Anesthesia problems Neg Hx     Cancer Neg Hx     Clotting disorder Neg Hx     Collagen disease Neg Hx     Diabetes Neg Hx     Dislocations Neg Hx     Learning disabilities Neg Hx     Neurological problems Neg Hx     Osteoporosis Neg Hx     Rheumatologic disease Neg Hx     Scoliosis Neg Hx     Vascular Disease Neg Hx        The following portions of the patient's history were reviewed and updated as appropriate: past family history, past medical history, past social history, past surgical history and problem list     Social History     Socioeconomic History    Marital status: /Civil Union     Spouse name: Not on file    Number of children: Not on file    Years of education: Not on file    Highest education level: Not on file   Occupational History    Not on file   Tobacco Use    Smoking status: Never Smoker    Smokeless tobacco: Never Used   Vaping Use    Vaping Use: Never used   Substance and Sexual Activity    Alcohol use: Not Currently    Drug use: Never    Sexual activity: Not Currently   Other Topics Concern    Not on file   Social History Narrative    Not on file     Social Determinants of Health     Financial Resource Strain:     Difficulty of Paying Living Expenses:    Food Insecurity:     Worried About Running Out of Food in the Last Year:     [address] in the Last Year:    Transportation Needs:     Lack of Transportation (Medical):      Lack of Transportation (Non-Medical):    Physical Activity:     Days of Exercise per Week:     Minutes of Exercise per Session:    Stress:     Feeling of Stress :    Social Connections:     Frequency of Communication with Friends and Family:     Frequency of Social Gatherings with Friends and Family:     Attends Denominational Services:     Active Member of Clubs or Organizations:     Attends Club or Organization Meetings:     Marital Status:    Intimate Partner Violence:     Fear of Current or Ex-Partner:     Emotionally Abused:     Physically Abused:     Sexually Abused:      Social History     Social History Narrative    Not on file        Social History     Tobacco History     Smoking Status  Never Smoker    Smokeless Tobacco Use  Never Used          Alcohol History     Alcohol Use Status  Not Currently          Drug Use     Drug Use Status  Never          Sexual Activity     Sexually Active  Not Currently          Activities of Daily Living    Not Asked                     OBJECTIVE:     Mental Status Evaluation:    Appearance age appropriate, casually dressed   Behavior pleasant, cooperative   Speech normal volume, normal pitch   Mood euthymic   Affect Mood-congruent   Thought Processes Coherent, organized, goal-directed   Associations intact associations   Thought Content normal   Perceptual Disturbances: none   Abnormal Thoughts  Risk Potential Suicidal ideation - None  Homicidal ideation - None  Potential for aggression - No   Orientation oriented to person, place, time/date and situation   Memory recent and remote memory grossly intact   Consciousness alert and awake   Attention Span attention span and concentration are age appropriate   Intellect Not formally assessed   Insight age appropriate    Judgement good    Muscle Strength and  Gait muscle strength and tone were normal   Language no difficulty naming common objects   Fund of Knowledge displays adequate knowledge of current events   Pain none   Pain Scale 0     The patient's chart was reviewed for relevant lab reports and recent encounters with other providers  Medications, treatment progress and treatment plan were reviewed with the patient  Medications, treatment progress and treatment plan were reviewed with the patient  The treatment plan was updated with the patient who agreed with the updated plan      Assessment/Plan:       Diagnoses and all orders for this visit:    Major depressive disorder, recurrent, in partial remission (HCC)    Generalized anxiety disorder    Gambling disorder, moderate    Primary insomnia          Treatment Recommendations/Precautions:    Decrease trazodone 100 mg 1 tab by mouth daily at bedtime to 1/2 tablet = 50 mg) at bedtime  Continue current medications:                duloxetine 60 mg capsule - take 1 capsule by mouth every day               duloxetine 30 mg capsule - take 1 capsule by mouth every day               Note: total daily dose of duloxetine  = 90 mg              quetiapine 100 mg tablet - take 1 tablet by mouth daily at bedtime    Continue to see Johann Lanes LCSW for psychotherapy     Risks/Benefits       Risks, Benefits And Possible Side Effects Of Medications:     Risks, benefits, and possible side effects of medications explained to patient and patient verbalizes understanding and agreement for treatment      Controlled Medication Discussion:      Not applicable

## 2021-08-31 ENCOUNTER — SOCIAL WORK (OUTPATIENT)
Dept: BEHAVIORAL/MENTAL HEALTH CLINIC | Facility: CLINIC | Age: 66
End: 2021-08-31
Payer: COMMERCIAL

## 2021-08-31 DIAGNOSIS — F33.41 MAJOR DEPRESSIVE DISORDER, RECURRENT, IN PARTIAL REMISSION (HCC): Primary | ICD-10-CM

## 2021-08-31 PROCEDURE — 90834 PSYTX W PT 45 MINUTES: CPT | Performed by: SOCIAL WORKER

## 2021-08-31 NOTE — PSYCH
This note was not shared with the patient due to this is a psychotherapy note    Psychotherapy Provided: Individual Psychotherapy 60 minutes     Length of time in session: 60 minutes, follow up in 2 week    Encounter Diagnosis     ICD-10-CM    1  Major depressive disorder, recurrent, in partial remission ([facility] )  F33 41        Goals addressed in session: Goal 1 and Goal 2     Pain:      none    0    Current suicide risk : Low     DATA: Met with Charity for scheduled individual session  Topics of discussion included weekend trip for the [employer]  Reported that she did not hidalgo, socialized with peers, abstained from alcohol  Feeling improved mood and believes that this is positively impacting her marriage, relationship with her mother and sister  Discussed relationship dynamics, how connections are made & experienced  Client shows evidence of utilizing Mindfulness-based strategies, emotion regulation skills and effective communication skills skills to manage mental health symptoms  During this session, this clinician used the following therapeutic modalities: supportive psychotherapy, client-centered therapy, mindfulness-based strategies and CBT techniques  ASSESSMENT: Lorrie Delacruz presents with a less anxious, less depressed mood  Her affect is normal range and intensity, appropriate  Lorrie Delacruz exhibits good therapeutic rapport with this clinician  Lorrie Delacruz continues to exhibit willingness to work on treatment goals and objectives  Lorrie Delacruz presents with a minimal risk of suicide, minimal risk of self-harm, and minimal risk of harm to others, as Client denies current SI/HI at time of visit  PLAN: Lorrie Delacruz will return in 2 weeks for the next scheduled session  Between sessions, Lorrie Delacruz will keep track of her mood, positive changes, in a journal and will report back during the next session re: successes and barriers   At the next session, this clinician will use supportive psychotherapy, client-centered therapy, mindfulness-based strategies, CBT techniques and DBT-informed skills to address current symptoms, in an effort to assist Charity with meeting treatment goals  Behavioral Health Treatment Plan [facility]: Diagnosis and Treatment Plan explained to Stephon Bolivar relates understanding diagnosis and is agreeable to Treatment Plan   Yes

## 2021-09-02 DIAGNOSIS — F41.1 GENERALIZED ANXIETY DISORDER: ICD-10-CM

## 2021-09-02 DIAGNOSIS — F33.41 MAJOR DEPRESSIVE DISORDER, RECURRENT, IN PARTIAL REMISSION (HCC): Primary | ICD-10-CM

## 2021-09-02 RX ORDER — QUETIAPINE FUMARATE 100 MG/1
100 TABLET, FILM COATED ORAL
Qty: 30 TABLET | Refills: 1 | Status: SHIPPED | OUTPATIENT
Start: 2021-09-02 | End: 2021-10-04 | Stop reason: SDUPTHER

## 2021-09-13 DIAGNOSIS — M79.7 FIBROMYALGIA: ICD-10-CM

## 2021-09-13 DIAGNOSIS — E78.2 MIXED DYSLIPIDEMIA: Primary | ICD-10-CM

## 2021-09-13 DIAGNOSIS — F41.1 GAD (GENERALIZED ANXIETY DISORDER): ICD-10-CM

## 2021-09-13 RX ORDER — GABAPENTIN 300 MG/1
300 CAPSULE ORAL 3 TIMES DAILY
Qty: 90 CAPSULE | Refills: 2 | Status: SHIPPED | OUTPATIENT
Start: 2021-09-13 | End: 2021-12-01 | Stop reason: SDUPTHER

## 2021-09-13 RX ORDER — ATORVASTATIN CALCIUM 20 MG/1
20 TABLET, FILM COATED ORAL DAILY
Qty: 90 TABLET | Refills: 3 | Status: SHIPPED | OUTPATIENT
Start: 2021-09-13

## 2021-09-13 NOTE — TELEPHONE ENCOUNTER
Keyshawn Hawkins - I haven't prescribed it for her in months, so I checked the [place] PDMP, and it showed her most recent Rx was dated 7/20/21 and was for 300 mg #90 for 30 days, and the one immediately before it, written on 7/1/21 and was for 300 mg #90 but was for 15 days  Perhaps she was looking at the older Rx bottle?

## 2021-09-16 ENCOUNTER — SOCIAL WORK (OUTPATIENT)
Dept: BEHAVIORAL/MENTAL HEALTH CLINIC | Facility: CLINIC | Age: 66
End: 2021-09-16
Payer: COMMERCIAL

## 2021-09-16 DIAGNOSIS — F33.41 MAJOR DEPRESSIVE DISORDER, RECURRENT, IN PARTIAL REMISSION (HCC): Primary | ICD-10-CM

## 2021-09-16 DIAGNOSIS — F33.41 MAJOR DEPRESSIVE DISORDER, RECURRENT, IN PARTIAL REMISSION (HCC): ICD-10-CM

## 2021-09-16 PROCEDURE — 90834 PSYTX W PT 45 MINUTES: CPT | Performed by: SOCIAL WORKER

## 2021-09-16 NOTE — PSYCH
This note was not shared with the patient due to this is a psychotherapy note    Psychotherapy Provided: Individual Psychotherapy 60 minutes     Length of time in session: 60 minutes, follow up in 1 week    Encounter Diagnosis     ICD-10-CM    1  Major depressive disorder, recurrent[facility]  F33 41        Goals addressed in session: Goal 1 and Goal 2     Pain:      none    0    Current suicide risk : Low     DATA: Met with Charity for scheduled individual session  Shared feeling well due to planning a vacation with her  to [facility], which she said that she appreciates because it was his idea without prompting  She shared feeling "thought of" which holds especially significant meaning because "it feels like we're reconnecting, like we're our younger selves"  Client reports feeling currently conflicted 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  Reviewed pros and cons, and decided that she would attend the interview and discuss the situation with her  in further detail  During this session, this clinician used the following therapeutic modalities: supportive psychotherapy, client-centered therapy, mindfulness-based strategies, CBT techniques, DBT-informed skills, Motivational Interviewing, solution-focused therapy and ACT  ASSESSMENT: Sandy Underwood presents with a improved mood  Her affect is normal range and intensity, appropriate  Sandy Underwood exhibits good therapeutic rapport with this clinician  Sandy Underwood continues to exhibit willingness to work on treatment goals and objectives  Sandy Underwood presents with a minimal risk of suicide, minimal risk of self-harm, and minimal risk of harm to others as Client denies current SI/HI at time of visit  PLAN: Sandy Underwood will return in 1 week for the next scheduled session   Between sessions, Sandy Underwood will have an open and honest conversation with her  regarding the job opportunity, and will report back during the next session re: successes and barriers  At the next session, this clinician will use supportive psychotherapy, client-centered therapy, mindfulness-based strategies, CBT techniques, DBT-informed skills, Motivational Interviewing, solution-focused therapy and ACT to address current symptoms, in an effort to assist Charity with meeting treatment goals  Behavioral Health Treatment Plan [facility]: Diagnosis and Treatment Plan explained to Tiago Pulliam relates understanding diagnosis and is agreeable to Treatment Plan   Yes

## 2021-09-20 RX ORDER — DULOXETIN HYDROCHLORIDE 60 MG/1
60 CAPSULE, DELAYED RELEASE ORAL DAILY
Qty: 30 CAPSULE | Refills: 0 | Status: SHIPPED | OUTPATIENT
Start: 2021-09-20 | End: 2021-10-25 | Stop reason: SDUPTHER

## 2021-09-20 RX ORDER — DULOXETIN HYDROCHLORIDE 30 MG/1
30 CAPSULE, DELAYED RELEASE ORAL DAILY
Qty: 30 CAPSULE | Refills: 0 | Status: SHIPPED | OUTPATIENT
Start: 2021-09-20 | End: 2021-10-25 | Stop reason: SDUPTHER

## 2021-09-27 DIAGNOSIS — K21.9 GASTROESOPHAGEAL REFLUX DISEASE, UNSPECIFIED WHETHER ESOPHAGITIS PRESENT: ICD-10-CM

## 2021-09-27 DIAGNOSIS — F33.41 MAJOR DEPRESSIVE DISORDER, RECURRENT, IN PARTIAL REMISSION (HCC): ICD-10-CM

## 2021-09-27 RX ORDER — TRAZODONE HYDROCHLORIDE 100 MG/1
100 TABLET ORAL
Qty: 30 TABLET | Refills: 0 | Status: SHIPPED | OUTPATIENT
Start: 2021-09-27 | End: 2021-10-25

## 2021-09-28 ENCOUNTER — TELEMEDICINE (OUTPATIENT)
Dept: BEHAVIORAL/MENTAL HEALTH CLINIC | Facility: CLINIC | Age: 66
End: 2021-09-28
Payer: COMMERCIAL

## 2021-09-28 DIAGNOSIS — F41.1 GENERALIZED ANXIETY DISORDER: ICD-10-CM

## 2021-09-28 DIAGNOSIS — F33.41 MAJOR DEPRESSIVE DISORDER, RECURRENT, IN PARTIAL REMISSION (HCC): Primary | ICD-10-CM

## 2021-09-28 PROCEDURE — 90834 PSYTX W PT 45 MINUTES: CPT | Performed by: SOCIAL WORKER

## 2021-09-28 RX ORDER — PANTOPRAZOLE SODIUM 40 MG/1
40 TABLET, DELAYED RELEASE ORAL DAILY
Qty: 90 TABLET | Refills: 1 | Status: SHIPPED | OUTPATIENT
Start: 2021-09-28 | End: 2022-04-01

## 2021-09-28 NOTE — PSYCH
This note was not shared with the patient due to this is a psychotherapy note    Virtual Regular Visit    Verification of patient location:    Patient is located in the following state in which I hold an active license [place]      Assessment/Plan:    Problem List Items Addressed This Visit     None          Goals addressed in session: Goal 1 and Goal 2          Reason for visit is   Chief Complaint   Patient presents with    Virtual Regular Visit        Encounter provider Dhaval Anderson LCSW    Provider located at [address] [place] [address]  [address] [place] [address]  [phone]      Recent Visits  No visits were found meeting these conditions  Showing recent visits within past 7 days and meeting all other requirements  Future Appointments  No visits were found meeting these conditions  Showing future appointments within next 150 days and meeting all other requirements       The patient was identified by name and date of birth  Jessy Plunkett was informed that this is a telemedicine visit and that the visit is being conducted through[employer] and patient was informed that this is a secure, HIPAA-compliant platform  She agrees to proceed     My office door was closed  No one else was in the room  She acknowledged consent and understanding of privacy and security of the video platform  The patient has agreed to participate and understands they can discontinue the visit at any time  Patient is aware this is a billable service  Subjective  Jessy Plunkett is a 73 y o  female with a history of MDD and anxiety        HPI     Past Medical History:   Diagnosis Date    Abdominal adhesions     Anesthesia complication     difficult to wake up    Anxiety     Depression     Depression     Disease of thyroid gland     Fibromyalgia     GERD (gastroesophageal reflux disease)     History of cholecystectomy 9/7/2019    Lazy eye     resolved: 3/27/17    Medical clearance for psychiatric admission 7/13/2021    [facility]) 2010    Osteopenia     with joint pain-elevated DEV    Psychiatric disorder     Tinnitus     Wears glasses     for reading       Past Surgical History:   Procedure Laterality Date    ABDOMINAL SURGERY      lysis of adhesions x 2    APPENDECTOMY      CERVICAL FUSION      CHOLECYSTECTOMY      open    DILATION AND CURETTAGE OF UTERUS      NEUROMA EXCISION Right 5/26/2017    Procedure: EXCISION MASS / FIBROMA FOOT;  Surgeon: Edwin Miles DPM;  Location: [address] [place] [address];  Service:     PARS PLANA VITRECTOMY W/ REPAIR OF MACULAR HOLE  12/23/2020    RIGHT OOPHORECTOMY      WISDOM TOOTH EXTRACTION      x4       Current Outpatient Medications   Medication Sig Dispense Refill    albuterol (PROVENTIL HFA,VENTOLIN HFA) 90 mcg/act inhaler Inhale 2 puffs every 4 (four) hours as needed for wheezing 1 Inhaler 0    ALPRAZolam (XANAX) 0 5 mg tablet Take 0 5 mg by mouth 2 (two) times a day as needed      aspirin (ECOTRIN LOW STRENGTH) 81 mg EC tablet Take 81 mg by mouth (Patient not taking: Reported on 8/3/2021)      atorvastatin (LIPITOR) 20 mg tablet Take 1 tablet (20 mg total) by mouth daily 90 tablet 3    baclofen 10 mg tablet Take 1 tablet (10 mg total) by mouth as needed for muscle spasms      calcium carbonate (OS-WILLY) 1250 (500 Ca) MG tablet Take 1,250 mg by mouth      clindamycin (CLEOCIN) 150 mg capsule take 1 capsule by mouth every 8 hours until finished (Patient not taking: Reported on 8/3/2021)      Docusate Sodium (DSS) 100 MG CAPS Take 100 mg by mouth every other day      DULoxetine (CYMBALTA) 30 mg delayed release capsule Take 1 capsule (30 mg total) by mouth daily 30 capsule 0    DULoxetine (CYMBALTA) 60 mg delayed release capsule Take 1 capsule (60 mg total) by mouth daily 30 capsule 0    gabapentin (NEURONTIN) 300 mg capsule Take 1 capsule (300 mg total) by mouth 3 (three) times a day 90 capsule 2    levothyroxine 50 mcg tablet take 1 tablet by mouth once daily 90 tablet 0    Magnesium 400 MG TABS Take by mouth daily      midodrine (PROAMATINE) 10 MG tablet Take 1 tablet (10 mg total) by mouth 3 (three) times a day 90 tablet 2    oxyCODONE-acetaminophen (PERCOCET) 5-325 mg per tablet Take 1 tablet by mouth every 6 (six) hours as needed (Patient not taking: Reported on 8/3/2021)      pantoprazole (PROTONIX) 40 mg tablet Take 1 tablet (40 mg total) by mouth daily 90 tablet 1    QUEtiapine (SEROquel) 100 mg tablet Take 1 tablet (100 mg total) by mouth daily at bedtime 30 tablet 1    traZODone (DESYREL) 100 mg tablet Take 1 tablet (100 mg total) by mouth daily at bedtime 30 tablet 0     No current facility-administered medications for this visit  Allergies   Allergen Reactions    Demerol [Meperidine] Lightheadedness     Reaction Date: 11Jan2006;     Sulfa Antibiotics Hives     Reaction Date: 11Jan2006;        Review of Systems    Video Exam    There were no vitals filed for this visit  Physical Exam     I spent 60 minutes directly with the patient during this visit    VIRTUAL VISIT DISCLAIMER    Hill Ma verbally agrees to participate in [employer]  Pt is aware that [employer] could be limited without vital signs or the ability to perform a full hands-on physical Janetjohn Balderasy understands she or the provider may request at any time to terminate the video visit and request the patient to seek care or treatment in person  Psychotherapy Provided: Individual Psychotherapy 60 minutes     Length of time in session: 60 minutes, follow up in 1 week    No diagnosis found  Goals addressed in session: Goal 1 and Goal 2     Pain:      none    0    Current suicide risk : Low     DATA: Met with Charity for scheduled individual session via the [employer] platform   She discussed weekly updates including working part time as a , managing her [employer] [employer] responsibilities, and establishing boundaries with her sister  As such she reported decreased symptoms to the point where she is considering lowering/discontinuing her psychotropic medication  Writer cautioned her to consider what the role her medications are playing in her current improved state  She was encouraged to speak to her provider before lowering/discontinuing any medication on her own, which she said that she would  Client shows evidence of utilizing emotion regulation skills and effective communication skills skills to manage mental health symptoms  During this session, this clinician used the following therapeutic modalities: supportive psychotherapy, client-centered therapy, mindfulness-based strategies, CBT techniques, DBT-informed skills, Motivational Interviewing, solution-focused therapy and ACT  ASSESSMENT: Chetan Lamas presents with a less anxious, less depressed mood  Her affect is normal range and intensity, appropriate  Chetan Lamas exhibits good therapeutic rapport with this clinician  Chetan Lamas continues to exhibit willingness to work on treatment goals and objectives  Chetan Lamas presents with a minimal risk of suicide, minimal risk of self-harm, and minimal risk of harm to others  PLAN: Chetan Lamas will return in 1 week for the next scheduled session  Between sessions, Chetan Lamas will continue to set limits as they arise and will report back during the next session re: successes and barriers  At the next session, this clinician will use supportive psychotherapy, client-centered therapy, mindfulness-based strategies, CBT techniques, DBT-informed skills, Motivational Interviewing, solution-focused therapy and ACT to address current symptoms, in an effort to assist Charity with meeting treatment goals  Behavioral Health Treatment Plan [facility]: Diagnosis and Treatment Plan explained to Reyna Dominguez relates understanding diagnosis and is agreeable to Treatment Plan   Yes

## 2021-10-04 DIAGNOSIS — F33.41 MAJOR DEPRESSIVE DISORDER, RECURRENT, IN PARTIAL REMISSION (HCC): ICD-10-CM

## 2021-10-05 ENCOUNTER — SOCIAL WORK (OUTPATIENT)
Dept: BEHAVIORAL/MENTAL HEALTH CLINIC | Facility: CLINIC | Age: 66
End: 2021-10-05
Payer: COMMERCIAL

## 2021-10-05 DIAGNOSIS — F41.1 GENERALIZED ANXIETY DISORDER: ICD-10-CM

## 2021-10-05 DIAGNOSIS — F33.41 MAJOR DEPRESSIVE DISORDER, RECURRENT, IN PARTIAL REMISSION (HCC): Primary | ICD-10-CM

## 2021-10-05 PROCEDURE — 90834 PSYTX W PT 45 MINUTES: CPT | Performed by: SOCIAL WORKER

## 2021-10-05 RX ORDER — QUETIAPINE FUMARATE 100 MG/1
100 TABLET, FILM COATED ORAL
Qty: 30 TABLET | Refills: 0 | Status: SHIPPED | OUTPATIENT
Start: 2021-10-05 | End: 2021-11-02 | Stop reason: SDUPTHER

## 2021-10-19 ENCOUNTER — SOCIAL WORK (OUTPATIENT)
Dept: BEHAVIORAL/MENTAL HEALTH CLINIC | Facility: CLINIC | Age: 66
End: 2021-10-19
Payer: COMMERCIAL

## 2021-10-19 DIAGNOSIS — F33.41 MAJOR DEPRESSIVE DISORDER, RECURRENT, IN PARTIAL REMISSION (HCC): Primary | ICD-10-CM

## 2021-10-19 DIAGNOSIS — F41.1 GENERALIZED ANXIETY DISORDER: ICD-10-CM

## 2021-10-19 PROCEDURE — 90834 PSYTX W PT 45 MINUTES: CPT | Performed by: SOCIAL WORKER

## 2021-10-22 DIAGNOSIS — F33.41 MAJOR DEPRESSIVE DISORDER, RECURRENT, IN PARTIAL REMISSION (HCC): ICD-10-CM

## 2021-10-25 DIAGNOSIS — F33.41 MAJOR DEPRESSIVE DISORDER, RECURRENT, IN PARTIAL REMISSION (HCC): ICD-10-CM

## 2021-10-25 RX ORDER — DULOXETIN HYDROCHLORIDE 30 MG/1
30 CAPSULE, DELAYED RELEASE ORAL DAILY
Qty: 30 CAPSULE | Refills: 0 | Status: SHIPPED | OUTPATIENT
Start: 2021-10-25 | End: 2021-11-17 | Stop reason: SDUPTHER

## 2021-10-25 RX ORDER — TRAZODONE HYDROCHLORIDE 100 MG/1
TABLET ORAL
Qty: 30 TABLET | Refills: 0 | Status: SHIPPED | OUTPATIENT
Start: 2021-10-25 | End: 2021-11-30

## 2021-10-25 RX ORDER — DULOXETIN HYDROCHLORIDE 60 MG/1
60 CAPSULE, DELAYED RELEASE ORAL DAILY
Qty: 30 CAPSULE | Refills: 0 | Status: SHIPPED | OUTPATIENT
Start: 2021-10-25 | End: 2021-11-17 | Stop reason: SDUPTHER

## 2021-10-29 ENCOUNTER — TELEPHONE (OUTPATIENT)
Dept: NEUROLOGY | Facility: CLINIC | Age: 66
End: 2021-10-29

## 2021-10-29 DIAGNOSIS — I95.1 ORTHOSTATIC HYPOTENSION: ICD-10-CM

## 2021-10-29 RX ORDER — MIDODRINE HYDROCHLORIDE 10 MG/1
10 TABLET ORAL 3 TIMES DAILY
Qty: 90 TABLET | Refills: 2 | Status: SHIPPED | OUTPATIENT
Start: 2021-10-29 | End: 2022-04-05 | Stop reason: SDUPTHER

## 2021-11-02 ENCOUNTER — SOCIAL WORK (OUTPATIENT)
Dept: BEHAVIORAL/MENTAL HEALTH CLINIC | Facility: CLINIC | Age: 66
End: 2021-11-02
Payer: COMMERCIAL

## 2021-11-02 DIAGNOSIS — F41.1 GENERALIZED ANXIETY DISORDER: ICD-10-CM

## 2021-11-02 DIAGNOSIS — F33.41 MAJOR DEPRESSIVE DISORDER, RECURRENT, IN PARTIAL REMISSION (HCC): Primary | ICD-10-CM

## 2021-11-02 DIAGNOSIS — F33.41 MAJOR DEPRESSIVE DISORDER, RECURRENT, IN PARTIAL REMISSION (HCC): ICD-10-CM

## 2021-11-02 PROCEDURE — 90834 PSYTX W PT 45 MINUTES: CPT | Performed by: SOCIAL WORKER

## 2021-11-03 RX ORDER — QUETIAPINE FUMARATE 100 MG/1
100 TABLET, FILM COATED ORAL
Qty: 30 TABLET | Refills: 0 | Status: SHIPPED | OUTPATIENT
Start: 2021-11-03 | End: 2021-11-17 | Stop reason: SDUPTHER

## 2021-11-05 ENCOUNTER — OFFICE VISIT (OUTPATIENT)
Dept: OBGYN CLINIC | Facility: CLINIC | Age: 66
End: 2021-11-05
Payer: COMMERCIAL

## 2021-11-05 ENCOUNTER — APPOINTMENT (OUTPATIENT)
Dept: RADIOLOGY | Facility: CLINIC | Age: 66
End: 2021-11-05
Payer: COMMERCIAL

## 2021-11-05 VITALS
HEIGHT: 64 IN | SYSTOLIC BLOOD PRESSURE: 140 MMHG | DIASTOLIC BLOOD PRESSURE: 90 MMHG | HEART RATE: 80 BPM | BODY MASS INDEX: 28 KG/M2 | WEIGHT: 164 LBS

## 2021-11-05 DIAGNOSIS — M79.18 DIFFUSE MYOFASCIAL PAIN SYNDROME: Primary | ICD-10-CM

## 2021-11-05 DIAGNOSIS — Z01.89 ENCOUNTER FOR LOWER EXTREMITY COMPARISON IMAGING STUDY: ICD-10-CM

## 2021-11-05 DIAGNOSIS — G89.29 CHRONIC PAIN OF RIGHT KNEE: ICD-10-CM

## 2021-11-05 DIAGNOSIS — M79.604 RIGHT LEG PAIN: ICD-10-CM

## 2021-11-05 DIAGNOSIS — M17.11 PRIMARY OSTEOARTHRITIS OF RIGHT KNEE: ICD-10-CM

## 2021-11-05 DIAGNOSIS — M25.561 RIGHT KNEE PAIN, UNSPECIFIED CHRONICITY: ICD-10-CM

## 2021-11-05 DIAGNOSIS — M25.561 CHRONIC PAIN OF RIGHT KNEE: ICD-10-CM

## 2021-11-05 PROCEDURE — 73562 X-RAY EXAM OF KNEE 3: CPT

## 2021-11-05 PROCEDURE — 73560 X-RAY EXAM OF KNEE 1 OR 2: CPT

## 2021-11-05 PROCEDURE — 99214 OFFICE O/P EST MOD 30 MIN: CPT | Performed by: ORTHOPAEDIC SURGERY

## 2021-11-05 RX ORDER — METHYLPREDNISOLONE 4 MG/1
TABLET ORAL
Qty: 1 EACH | Refills: 0 | Status: SHIPPED | OUTPATIENT
Start: 2021-11-05 | End: 2021-11-18

## 2021-11-10 ENCOUNTER — OFFICE VISIT (OUTPATIENT)
Dept: FAMILY MEDICINE CLINIC | Facility: CLINIC | Age: 66
End: 2021-11-10
Payer: COMMERCIAL

## 2021-11-10 VITALS
DIASTOLIC BLOOD PRESSURE: 72 MMHG | RESPIRATION RATE: 16 BRPM | SYSTOLIC BLOOD PRESSURE: 126 MMHG | BODY MASS INDEX: 27.66 KG/M2 | HEART RATE: 83 BPM | TEMPERATURE: 97.4 F | HEIGHT: 65 IN | OXYGEN SATURATION: 99 % | WEIGHT: 166 LBS

## 2021-11-10 DIAGNOSIS — Z23 NEED FOR VACCINATION: ICD-10-CM

## 2021-11-10 DIAGNOSIS — Z00.00 WELL ADULT EXAM: Primary | ICD-10-CM

## 2021-11-10 DIAGNOSIS — E78.2 MIXED DYSLIPIDEMIA: ICD-10-CM

## 2021-11-10 DIAGNOSIS — E03.9 ADULT HYPOTHYROIDISM: ICD-10-CM

## 2021-11-10 PROBLEM — M17.9 OSTEOARTHRITIS OF KNEE: Status: ACTIVE | Noted: 2021-11-10

## 2021-11-10 PROBLEM — M17.10 OSTEOARTHRITIS OF KNEE: Status: ACTIVE | Noted: 2021-11-10

## 2021-11-10 PROBLEM — K08.89 TOOTHACHE: Status: RESOLVED | Noted: 2021-07-20 | Resolved: 2021-11-10

## 2021-11-10 PROBLEM — K21.9 GASTROESOPHAGEAL REFLUX DISEASE: Status: RESOLVED | Noted: 2020-11-04 | Resolved: 2021-11-10

## 2021-11-10 PROCEDURE — 90670 PCV13 VACCINE IM: CPT

## 2021-11-10 PROCEDURE — 90472 IMMUNIZATION ADMIN EACH ADD: CPT

## 2021-11-10 PROCEDURE — 99397 PER PM REEVAL EST PAT 65+ YR: CPT | Performed by: INTERNAL MEDICINE

## 2021-11-10 PROCEDURE — 90471 IMMUNIZATION ADMIN: CPT

## 2021-11-10 PROCEDURE — 90662 IIV NO PRSV INCREASED AG IM: CPT

## 2021-11-10 RX ORDER — SODIUM FLUORIDE1.1%, POTASSIUM NITRATE 5% 5.8; 57.5 MG/ML; MG/ML
GEL, DENTIFRICE DENTAL
COMMUNITY
Start: 2021-11-09

## 2021-11-14 ENCOUNTER — OFFICE VISIT (OUTPATIENT)
Dept: URGENT CARE | Facility: CLINIC | Age: 66
End: 2021-11-14
Payer: COMMERCIAL

## 2021-11-14 VITALS
DIASTOLIC BLOOD PRESSURE: 74 MMHG | TEMPERATURE: 98.9 F | HEART RATE: 117 BPM | OXYGEN SATURATION: 99 % | RESPIRATION RATE: 16 BRPM | SYSTOLIC BLOOD PRESSURE: 127 MMHG | HEIGHT: 65 IN | WEIGHT: 165 LBS | BODY MASS INDEX: 27.49 KG/M2

## 2021-11-14 DIAGNOSIS — J40 BRONCHITIS: Primary | ICD-10-CM

## 2021-11-14 PROCEDURE — 99213 OFFICE O/P EST LOW 20 MIN: CPT | Performed by: PHYSICIAN ASSISTANT

## 2021-11-14 PROCEDURE — U0005 INFEC AGEN DETEC AMPLI PROBE: HCPCS | Performed by: PHYSICIAN ASSISTANT

## 2021-11-14 PROCEDURE — U0003 INFECTIOUS AGENT DETECTION BY NUCLEIC ACID (DNA OR RNA); SEVERE ACUTE RESPIRATORY SYNDROME CORONAVIRUS 2 (SARS-COV-2) (CORONAVIRUS DISEASE [COVID-19]), AMPLIFIED PROBE TECHNIQUE, MAKING USE OF HIGH THROUGHPUT TECHNOLOGIES AS DESCRIBED BY CMS-2020-01-R: HCPCS | Performed by: PHYSICIAN ASSISTANT

## 2021-11-15 LAB — SARS-COV-2 RNA RESP QL NAA+PROBE: NEGATIVE

## 2021-11-16 ENCOUNTER — TELEMEDICINE (OUTPATIENT)
Dept: BEHAVIORAL/MENTAL HEALTH CLINIC | Facility: CLINIC | Age: 66
End: 2021-11-16
Payer: COMMERCIAL

## 2021-11-16 DIAGNOSIS — F41.1 GENERALIZED ANXIETY DISORDER: ICD-10-CM

## 2021-11-16 DIAGNOSIS — F33.41 MAJOR DEPRESSIVE DISORDER, RECURRENT, IN PARTIAL REMISSION (HCC): Primary | ICD-10-CM

## 2021-11-16 PROCEDURE — 90834 PSYTX W PT 45 MINUTES: CPT | Performed by: SOCIAL WORKER

## 2021-11-17 ENCOUNTER — TELEMEDICINE (OUTPATIENT)
Dept: PSYCHIATRY | Facility: CLINIC | Age: 66
End: 2021-11-17
Payer: COMMERCIAL

## 2021-11-17 DIAGNOSIS — F51.05 INSOMNIA RELATED TO ANOTHER MENTAL DISORDER: ICD-10-CM

## 2021-11-17 DIAGNOSIS — F33.41 MAJOR DEPRESSIVE DISORDER, RECURRENT, IN PARTIAL REMISSION (HCC): Primary | ICD-10-CM

## 2021-11-17 DIAGNOSIS — F41.1 GENERALIZED ANXIETY DISORDER: ICD-10-CM

## 2021-11-17 PROBLEM — F63.0 GAMBLING DISORDER, MODERATE: Status: ACTIVE | Noted: 2021-11-17

## 2021-11-17 PROBLEM — F51.01 PRIMARY INSOMNIA: Status: ACTIVE | Noted: 2021-11-17

## 2021-11-17 PROCEDURE — 90833 PSYTX W PT W E/M 30 MIN: CPT | Performed by: NURSE PRACTITIONER

## 2021-11-17 PROCEDURE — 99214 OFFICE O/P EST MOD 30 MIN: CPT | Performed by: NURSE PRACTITIONER

## 2021-11-17 RX ORDER — DULOXETIN HYDROCHLORIDE 60 MG/1
60 CAPSULE, DELAYED RELEASE ORAL DAILY
Qty: 90 CAPSULE | Refills: 0 | Status: SHIPPED | OUTPATIENT
Start: 2021-11-17 | End: 2022-02-23 | Stop reason: SDUPTHER

## 2021-11-17 RX ORDER — DULOXETIN HYDROCHLORIDE 30 MG/1
30 CAPSULE, DELAYED RELEASE ORAL DAILY
Qty: 90 CAPSULE | Refills: 0 | Status: SHIPPED | OUTPATIENT
Start: 2021-11-17 | End: 2022-02-23 | Stop reason: SDUPTHER

## 2021-11-17 RX ORDER — QUETIAPINE FUMARATE 100 MG/1
100 TABLET, FILM COATED ORAL
Qty: 90 TABLET | Refills: 0 | Status: SHIPPED | OUTPATIENT
Start: 2021-11-17 | End: 2022-02-23 | Stop reason: SDUPTHER

## 2021-11-18 ENCOUNTER — OFFICE VISIT (OUTPATIENT)
Dept: FAMILY MEDICINE CLINIC | Facility: CLINIC | Age: 66
End: 2021-11-18
Payer: COMMERCIAL

## 2021-11-18 VITALS
SYSTOLIC BLOOD PRESSURE: 124 MMHG | WEIGHT: 166.6 LBS | HEART RATE: 95 BPM | BODY MASS INDEX: 27.76 KG/M2 | RESPIRATION RATE: 17 BRPM | TEMPERATURE: 100.1 F | HEIGHT: 65 IN | DIASTOLIC BLOOD PRESSURE: 74 MMHG

## 2021-11-18 DIAGNOSIS — J20.9 ACUTE BRONCHITIS, UNSPECIFIED ORGANISM: Primary | ICD-10-CM

## 2021-11-18 PROCEDURE — 99213 OFFICE O/P EST LOW 20 MIN: CPT | Performed by: NURSE PRACTITIONER

## 2021-11-18 RX ORDER — CEFDINIR 300 MG/1
300 CAPSULE ORAL EVERY 12 HOURS SCHEDULED
Qty: 20 CAPSULE | Refills: 0 | Status: SHIPPED | OUTPATIENT
Start: 2021-11-18 | End: 2021-11-26 | Stop reason: HOSPADM

## 2021-11-19 DIAGNOSIS — F51.05 INSOMNIA RELATED TO ANOTHER MENTAL DISORDER: Primary | ICD-10-CM

## 2021-11-19 RX ORDER — TRAZODONE HYDROCHLORIDE 50 MG/1
50 TABLET ORAL
COMMUNITY
End: 2021-11-19 | Stop reason: SDUPTHER

## 2021-11-19 NOTE — TELEPHONE ENCOUNTER
----- Message from Denae Hammer sent at 11/19/2021  9:17 AM EST -----  Regarding: refill  traZODone (DESYREL) 50 mg tablet ( new dose decreased at last visit)  [facility]/pharmacy #[id number] - [place] - [address]  11/17

## 2021-11-22 DIAGNOSIS — E03.8 OTHER SPECIFIED HYPOTHYROIDISM: ICD-10-CM

## 2021-11-22 RX ORDER — LEVOTHYROXINE SODIUM 0.05 MG/1
50 TABLET ORAL DAILY
Qty: 90 TABLET | Refills: 0 | Status: SHIPPED | OUTPATIENT
Start: 2021-11-22 | End: 2022-02-25 | Stop reason: SDUPTHER

## 2021-11-22 RX ORDER — TRAZODONE HYDROCHLORIDE 50 MG/1
50 TABLET ORAL
Qty: 30 TABLET | Refills: 1 | Status: SHIPPED | OUTPATIENT
Start: 2021-11-22 | End: 2022-01-18

## 2021-11-26 ENCOUNTER — OFFICE VISIT (OUTPATIENT)
Dept: OBGYN CLINIC | Facility: CLINIC | Age: 66
End: 2021-11-26
Payer: COMMERCIAL

## 2021-11-26 VITALS
BODY MASS INDEX: 27.66 KG/M2 | HEART RATE: 84 BPM | HEIGHT: 65 IN | SYSTOLIC BLOOD PRESSURE: 98 MMHG | DIASTOLIC BLOOD PRESSURE: 66 MMHG | WEIGHT: 166 LBS

## 2021-11-26 DIAGNOSIS — M79.604 RIGHT LEG PAIN: ICD-10-CM

## 2021-11-26 DIAGNOSIS — M79.18 DIFFUSE MYOFASCIAL PAIN SYNDROME: ICD-10-CM

## 2021-11-26 DIAGNOSIS — M35.3 POLYMYALGIA (HCC): Primary | ICD-10-CM

## 2021-11-26 PROCEDURE — 3008F BODY MASS INDEX DOCD: CPT | Performed by: ORTHOPAEDIC SURGERY

## 2021-11-26 PROCEDURE — 4040F PNEUMOC VAC/ADMIN/RCVD: CPT | Performed by: ORTHOPAEDIC SURGERY

## 2021-11-26 PROCEDURE — 99214 OFFICE O/P EST MOD 30 MIN: CPT | Performed by: ORTHOPAEDIC SURGERY

## 2021-11-26 PROCEDURE — 1036F TOBACCO NON-USER: CPT | Performed by: ORTHOPAEDIC SURGERY

## 2021-11-26 RX ORDER — AMOXICILLIN 875 MG/1
TABLET, COATED ORAL
COMMUNITY
Start: 2021-11-24 | End: 2021-11-30

## 2021-11-26 RX ORDER — KETOROLAC TROMETHAMINE 5 MG/ML
SOLUTION OPHTHALMIC
Status: ON HOLD | COMMUNITY
Start: 2021-11-18 | End: 2021-12-06 | Stop reason: SDUPTHER

## 2021-11-26 RX ORDER — ACETAMINOPHEN AND CODEINE PHOSPHATE 300; 30 MG/1; MG/1
TABLET ORAL
COMMUNITY
Start: 2021-11-24 | End: 2022-02-10

## 2021-11-26 RX ORDER — GATIFLOXACIN 5 MG/ML
SOLUTION/ DROPS OPHTHALMIC
Status: ON HOLD | COMMUNITY
Start: 2021-11-18 | End: 2021-12-06 | Stop reason: SDUPTHER

## 2021-11-26 RX ORDER — PREDNISOLONE ACETATE 10 MG/ML
SUSPENSION/ DROPS OPHTHALMIC
COMMUNITY
Start: 2021-11-18 | End: 2021-12-06 | Stop reason: HOSPADM

## 2021-11-30 ENCOUNTER — SOCIAL WORK (OUTPATIENT)
Dept: BEHAVIORAL/MENTAL HEALTH CLINIC | Facility: CLINIC | Age: 66
End: 2021-11-30
Payer: COMMERCIAL

## 2021-11-30 DIAGNOSIS — F33.41 MAJOR DEPRESSIVE DISORDER, RECURRENT, IN PARTIAL REMISSION (HCC): Primary | ICD-10-CM

## 2021-11-30 PROCEDURE — 90834 PSYTX W PT 45 MINUTES: CPT | Performed by: SOCIAL WORKER

## 2021-12-01 ENCOUNTER — OFFICE VISIT (OUTPATIENT)
Dept: NEUROLOGY | Facility: CLINIC | Age: 66
End: 2021-12-01
Payer: COMMERCIAL

## 2021-12-01 VITALS
HEART RATE: 88 BPM | BODY MASS INDEX: 28.49 KG/M2 | TEMPERATURE: 95.9 F | DIASTOLIC BLOOD PRESSURE: 74 MMHG | SYSTOLIC BLOOD PRESSURE: 130 MMHG | HEIGHT: 65 IN | WEIGHT: 171 LBS

## 2021-12-01 DIAGNOSIS — M54.12 CERVICAL RADICULOPATHY: ICD-10-CM

## 2021-12-01 DIAGNOSIS — M79.7 FIBROMYALGIA: Primary | ICD-10-CM

## 2021-12-01 DIAGNOSIS — I95.1 ORTHOSTATIC HYPOTENSION: ICD-10-CM

## 2021-12-01 DIAGNOSIS — M54.16 RADICULOPATHY, LUMBAR REGION: ICD-10-CM

## 2021-12-01 DIAGNOSIS — G62.9 PERIPHERAL NEUROPATHY: ICD-10-CM

## 2021-12-01 PROCEDURE — 99214 OFFICE O/P EST MOD 30 MIN: CPT | Performed by: PSYCHIATRY & NEUROLOGY

## 2021-12-01 PROCEDURE — 1036F TOBACCO NON-USER: CPT | Performed by: PSYCHIATRY & NEUROLOGY

## 2021-12-01 RX ORDER — GABAPENTIN 300 MG/1
600 CAPSULE ORAL 3 TIMES DAILY
Qty: 180 CAPSULE | Refills: 5 | Status: SHIPPED | OUTPATIENT
Start: 2021-12-01 | End: 2022-06-07 | Stop reason: SDUPTHER

## 2021-12-06 ENCOUNTER — HOSPITAL ENCOUNTER (OUTPATIENT)
Facility: AMBULARY SURGERY CENTER | Age: 66
Setting detail: OUTPATIENT SURGERY
Discharge: HOME | End: 2021-12-06
Attending: OPHTHALMOLOGY | Admitting: OPHTHALMOLOGY
Payer: COMMERCIAL

## 2021-12-06 ENCOUNTER — ANESTHESIA (OUTPATIENT)
Dept: PERIOP | Facility: AMBULARY SURGERY CENTER | Age: 66
End: 2021-12-06
Payer: COMMERCIAL

## 2021-12-06 ENCOUNTER — ANESTHESIA EVENT (OUTPATIENT)
Dept: PERIOP | Facility: AMBULARY SURGERY CENTER | Age: 66
End: 2021-12-06
Payer: COMMERCIAL

## 2021-12-06 VITALS
OXYGEN SATURATION: 100 % | RESPIRATION RATE: 18 BRPM | DIASTOLIC BLOOD PRESSURE: 65 MMHG | HEART RATE: 79 BPM | TEMPERATURE: 97.3 F | SYSTOLIC BLOOD PRESSURE: 113 MMHG

## 2021-12-06 DIAGNOSIS — H25.12 AGE-RELATED NUCLEAR CATARACT OF LEFT EYE: Primary | ICD-10-CM

## 2021-12-06 PROCEDURE — V2632 POST CHMBR INTRAOCULAR LENS: HCPCS | Performed by: OPHTHALMOLOGY

## 2021-12-06 DEVICE — ACRYSOF(R) IQ ASPHERIC NATURAL IOL, SINGLE-PIECE ACRYLIC FOLDABLE PCL, UV WITH BLUE LIGHTFILTER, 13.0MM LENGTH, 6.0MM ANTERIORASYMMETRIC BICONVEX OPTIC, PLANAR HAPTICS.
Type: IMPLANTABLE DEVICE | Site: EYE | Status: FUNCTIONAL
Brand: ACRYSOF®

## 2021-12-06 RX ORDER — GATIFLOXACIN 5 MG/ML
1 SOLUTION/ DROPS OPHTHALMIC 2 TIMES DAILY
Refills: 0
Start: 2021-12-06 | End: 2022-02-24 | Stop reason: ALTCHOICE

## 2021-12-06 RX ORDER — LIDOCAINE HYDROCHLORIDE 10 MG/ML
INJECTION, SOLUTION EPIDURAL; INFILTRATION; INTRACAUDAL; PERINEURAL AS NEEDED
Status: DISCONTINUED | OUTPATIENT
Start: 2021-12-06 | End: 2021-12-06 | Stop reason: HOSPADM

## 2021-12-06 RX ORDER — BALANCED SALT SOLUTION 6.4; .75; .48; .3; 3.9; 1.7 MG/ML; MG/ML; MG/ML; MG/ML; MG/ML; MG/ML
SOLUTION OPHTHALMIC AS NEEDED
Status: DISCONTINUED | OUTPATIENT
Start: 2021-12-06 | End: 2021-12-06 | Stop reason: HOSPADM

## 2021-12-06 RX ORDER — PHENYLEPHRINE HCL 2.5 %
1 DROPS OPHTHALMIC (EYE)
Status: ACTIVE | OUTPATIENT
Start: 2021-12-06 | End: 2021-12-06

## 2021-12-06 RX ORDER — MIDAZOLAM HYDROCHLORIDE 2 MG/2ML
INJECTION, SOLUTION INTRAMUSCULAR; INTRAVENOUS AS NEEDED
Status: DISCONTINUED | OUTPATIENT
Start: 2021-12-06 | End: 2021-12-06

## 2021-12-06 RX ORDER — TETRACAINE HYDROCHLORIDE 5 MG/ML
1 SOLUTION OPHTHALMIC ONCE
Status: COMPLETED | OUTPATIENT
Start: 2021-12-06 | End: 2021-12-06

## 2021-12-06 RX ORDER — KETOROLAC TROMETHAMINE 5 MG/ML
1 SOLUTION OPHTHALMIC
Status: ACTIVE | OUTPATIENT
Start: 2021-12-06 | End: 2021-12-06

## 2021-12-06 RX ORDER — CYCLOPENTOLATE HYDROCHLORIDE 10 MG/ML
1 SOLUTION/ DROPS OPHTHALMIC
Status: ACTIVE | OUTPATIENT
Start: 2021-12-06 | End: 2021-12-06

## 2021-12-06 RX ORDER — KETOROLAC TROMETHAMINE 5 MG/ML
1 SOLUTION OPHTHALMIC 4 TIMES DAILY
Qty: 5 ML | Refills: 0
Start: 2021-12-06 | End: 2022-04-18 | Stop reason: ALTCHOICE

## 2021-12-06 RX ORDER — TETRACAINE HYDROCHLORIDE 5 MG/ML
SOLUTION OPHTHALMIC AS NEEDED
Status: DISCONTINUED | OUTPATIENT
Start: 2021-12-06 | End: 2021-12-06 | Stop reason: HOSPADM

## 2021-12-06 RX ORDER — GATIFLOXACIN 5 MG/ML
SOLUTION/ DROPS OPHTHALMIC AS NEEDED
Status: DISCONTINUED | OUTPATIENT
Start: 2021-12-06 | End: 2021-12-06 | Stop reason: HOSPADM

## 2021-12-06 RX ORDER — LIDOCAINE HYDROCHLORIDE 20 MG/ML
1 JELLY TOPICAL
Status: COMPLETED | OUTPATIENT
Start: 2021-12-06 | End: 2021-12-06

## 2021-12-06 RX ADMIN — KETOROLAC TROMETHAMINE 1 DROP: 5 SOLUTION OPHTHALMIC at 10:15

## 2021-12-06 RX ADMIN — CYCLOPENTOLATE HYDROCHLORIDE 1 DROP: 10 SOLUTION/ DROPS OPHTHALMIC at 10:15

## 2021-12-06 RX ADMIN — LIDOCAINE HYDROCHLORIDE 1 APPLICATION: 20 JELLY TOPICAL at 10:15

## 2021-12-06 RX ADMIN — PHENYLEPHRINE HYDROCHLORIDE 1 DROP: 25 SOLUTION/ DROPS OPHTHALMIC at 09:57

## 2021-12-06 RX ADMIN — KETOROLAC TROMETHAMINE 1 DROP: 5 SOLUTION OPHTHALMIC at 09:44

## 2021-12-06 RX ADMIN — PHENYLEPHRINE HYDROCHLORIDE 1 DROP: 25 SOLUTION/ DROPS OPHTHALMIC at 10:15

## 2021-12-06 RX ADMIN — CYCLOPENTOLATE HYDROCHLORIDE 1 DROP: 10 SOLUTION/ DROPS OPHTHALMIC at 09:57

## 2021-12-06 RX ADMIN — MIDAZOLAM 2 MG: 1 INJECTION INTRAMUSCULAR; INTRAVENOUS at 10:18

## 2021-12-06 RX ADMIN — LIDOCAINE HYDROCHLORIDE 1 APPLICATION: 20 JELLY TOPICAL at 09:57

## 2021-12-06 RX ADMIN — CYCLOPENTOLATE HYDROCHLORIDE 1 DROP: 10 SOLUTION/ DROPS OPHTHALMIC at 09:44

## 2021-12-06 RX ADMIN — PHENYLEPHRINE HYDROCHLORIDE 1 DROP: 25 SOLUTION/ DROPS OPHTHALMIC at 09:44

## 2021-12-06 RX ADMIN — TETRACAINE HYDROCHLORIDE 1 DROP: 5 SOLUTION OPHTHALMIC at 09:44

## 2021-12-06 RX ADMIN — LIDOCAINE HYDROCHLORIDE 1 APPLICATION: 20 JELLY TOPICAL at 09:44

## 2021-12-06 RX ADMIN — KETOROLAC TROMETHAMINE 1 DROP: 5 SOLUTION OPHTHALMIC at 09:57

## 2021-12-13 ENCOUNTER — HOSPITAL ENCOUNTER (OUTPATIENT)
Dept: RADIOLOGY | Facility: HOSPITAL | Age: 66
Discharge: HOME | End: 2021-12-13
Payer: COMMERCIAL

## 2021-12-13 VITALS — WEIGHT: 171 LBS | BODY MASS INDEX: 28.49 KG/M2 | HEIGHT: 65 IN

## 2021-12-13 DIAGNOSIS — Z12.31 ENCOUNTER FOR SCREENING MAMMOGRAM FOR MALIGNANT NEOPLASM OF BREAST: ICD-10-CM

## 2021-12-13 PROCEDURE — 77067 SCR MAMMO BI INCL CAD: CPT

## 2021-12-13 PROCEDURE — 77063 BREAST TOMOSYNTHESIS BI: CPT

## 2021-12-14 ENCOUNTER — SOCIAL WORK (OUTPATIENT)
Dept: BEHAVIORAL/MENTAL HEALTH CLINIC | Facility: CLINIC | Age: 66
End: 2021-12-14
Payer: COMMERCIAL

## 2021-12-14 DIAGNOSIS — F41.1 GENERALIZED ANXIETY DISORDER: ICD-10-CM

## 2021-12-14 DIAGNOSIS — F33.41 MAJOR DEPRESSIVE DISORDER, RECURRENT, IN PARTIAL REMISSION (HCC): Primary | ICD-10-CM

## 2021-12-14 PROCEDURE — 90834 PSYTX W PT 45 MINUTES: CPT | Performed by: SOCIAL WORKER

## 2021-12-21 ENCOUNTER — OFFICE VISIT (OUTPATIENT)
Dept: URGENT CARE | Facility: CLINIC | Age: 66
End: 2021-12-21
Payer: COMMERCIAL

## 2021-12-21 VITALS
RESPIRATION RATE: 18 BRPM | HEART RATE: 76 BPM | WEIGHT: 177 LBS | DIASTOLIC BLOOD PRESSURE: 85 MMHG | SYSTOLIC BLOOD PRESSURE: 137 MMHG | HEIGHT: 65 IN | BODY MASS INDEX: 29.49 KG/M2 | TEMPERATURE: 96.6 F | OXYGEN SATURATION: 98 %

## 2021-12-21 DIAGNOSIS — S60.311A ABRASION OF RIGHT THUMB, INITIAL ENCOUNTER: Primary | ICD-10-CM

## 2021-12-21 PROCEDURE — 3008F BODY MASS INDEX DOCD: CPT | Performed by: PSYCHIATRY & NEUROLOGY

## 2021-12-21 PROCEDURE — 99212 OFFICE O/P EST SF 10 MIN: CPT | Performed by: PHYSICIAN ASSISTANT

## 2021-12-28 ENCOUNTER — SOCIAL WORK (OUTPATIENT)
Dept: BEHAVIORAL/MENTAL HEALTH CLINIC | Facility: CLINIC | Age: 66
End: 2021-12-28
Payer: COMMERCIAL

## 2021-12-28 DIAGNOSIS — F33.41 MAJOR DEPRESSIVE DISORDER, RECURRENT, IN PARTIAL REMISSION (HCC): Primary | ICD-10-CM

## 2021-12-28 PROCEDURE — 90834 PSYTX W PT 45 MINUTES: CPT | Performed by: SOCIAL WORKER

## 2022-01-03 ENCOUNTER — TELEPHONE (OUTPATIENT)
Dept: PSYCHIATRY | Facility: CLINIC | Age: 67
End: 2022-01-03

## 2022-01-03 NOTE — TELEPHONE ENCOUNTER
Called pt at her request, she says that she is doing much better, doesn't need a change in her medications, talking to her psychotherapist helped her put things in perspective  Pt has an appointment for med management on 1/18/22 and for now will not cancel it just in case her negative thoughts reemerge

## 2022-01-11 ENCOUNTER — TELEMEDICINE (OUTPATIENT)
Dept: BEHAVIORAL/MENTAL HEALTH CLINIC | Facility: CLINIC | Age: 67
End: 2022-01-11
Payer: COMMERCIAL

## 2022-01-11 DIAGNOSIS — F33.41 MAJOR DEPRESSIVE DISORDER, RECURRENT, IN PARTIAL REMISSION (HCC): Primary | ICD-10-CM

## 2022-01-11 PROCEDURE — 90834 PSYTX W PT 45 MINUTES: CPT | Performed by: SOCIAL WORKER

## 2022-01-11 NOTE — PSYCH
This note was not shared with the patient due to this is a psychotherapy note    Psychotherapy Provided: Individual Psychotherapy 60 minutes     Length of time in session: 60 minutes, follow up in 2 week    Encounter Diagnosis     ICD-10-CM    1  Major depressive disorder, recurrent, in partial remission ([facility] )  F33 41        Goals addressed in session: Goal 1     Pain:      none    0    Current suicide risk : Low     DATA: Met with Faye Pruett for scheduled individual session  Reported feeling better since our last appointment but is still struggling with disappointment of expectations not being met (family members not being appreciative of [employer])  ASSESSMENT: Faye Pruett presents with a improved mood  Her affect is normal range and intensity, appropriate  Faye Pruett exhibits good therapeutic rapport with this clinician  Faye Pruett continues to exhibit willingness to work on treatment goals and objectives  Faye Pruett presents with a minimal risk of suicide, minimal risk of self-harm, and minimal risk of harm to others  PLAN: Faye Pruett will return in 2 weeks for the next scheduled session  Between sessions, Faye Pruett will have direct conversations with others with regard to how she feels, and will report back during the next session re: successes and barriers  At the next session, this clinician will use supportive psychotherapy, client-centered therapy and CBT techniques to address symptom reduction, in an effort to assist Faye Pruett with meeting treatment goals  Behavioral Health Treatment Plan [facility]: Diagnosis and Treatment Plan explained to Latricia Frank relates understanding diagnosis and is agreeable to Treatment Plan   Yes

## 2022-01-18 ENCOUNTER — TELEMEDICINE (OUTPATIENT)
Dept: PSYCHIATRY | Facility: CLINIC | Age: 67
End: 2022-01-18
Payer: COMMERCIAL

## 2022-01-18 DIAGNOSIS — F41.1 GENERALIZED ANXIETY DISORDER: ICD-10-CM

## 2022-01-18 DIAGNOSIS — F51.05 INSOMNIA RELATED TO ANOTHER MENTAL DISORDER: ICD-10-CM

## 2022-01-18 DIAGNOSIS — F33.41 MAJOR DEPRESSIVE DISORDER, RECURRENT, IN PARTIAL REMISSION (HCC): Primary | ICD-10-CM

## 2022-01-18 PROCEDURE — 99214 OFFICE O/P EST MOD 30 MIN: CPT | Performed by: NURSE PRACTITIONER

## 2022-01-18 NOTE — PSYCH
This note was not shared with the patient due to privacy exception: note includes other individuals    Scott      Name and Date of Birth:  Sacha Wheatley 73 y o  1955    Date of Visit: 01/18/22      Virtual Regular Visit    After connecting through [employer], this patient was identified by name and date of birth, was informed that this is a telemedicine visit, and that it is being conducted through [facility], which this patient was informed is a secure HIPPA-compliant platform; this patient agreed to proceed  During this visit, I was alone in my office and my office door was closed  This patient acknowledged consent and understanding of privacy and security of this video platform  This patient gives consent to continue and knows that She can discontinue the visit at any time  Time spent on psychotherapy: 10 minutes    Treatment modality: medication management; medication education, discussed/supported adaptive behavior      SUBJECTIVE: doing well, not anxious, not depressed at all  She is finding psychotherapy to be very beneficial, working a lot, 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, loves teaching, 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, it's all giving her purpose, needs to feel valued and appreciated, when she isn't she retreats into negative thought patterns  Worked four days per week last two weeks  Hasn't talked to Toñito Leon about something she and her psychotherapist talked about, but is going to     11/17/2021: not anxious  Feeling ill because she had an influenza and pneumonia vaccines while she was taking steroids, has felt sick since then, when she tried to dust her heartbeat increased and she had to sit down  She wonders if perhaps 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, is keeping busy working there and enjoys it   Her sleep is very good, which is huge for her, when she didn't sleep well it affected her mood  She takes 1/2 tablet of trazodone 100 mg, she has been taking it like that for weeks and it has been working  Taking both doses of duloxetine in the morning  She has never been dx with SAD but is an outdoor person and can't do that this time of year, doesn't have that physical activity this time of year  Not thinking of harming herself, has better coping skills, reading a lot  Coit Garrick made her homemade chicken soup yesterday  Having cataract surgery on 12/6 in her left eye which is her good eye  Has had fleeting thoughts of gambling but has resisted the urge, hasn't done it in 11 months, she should call one of the people in the group she goes to and her [employer] sponsor helps her a lot too         She denies suicidal ideation, intent or plan at present, has no suicidal ideation, intent or plan at present  She denies any auditory hallucinations and visual hallucinations, denies any other delusional thinking, denies any delusional thinking  She denies any side effects from medications      HPI ROS Appetite Changes and Sleep: normal appetite, normal sleep    Review Of Systems:      Constitutional Negative   ENT Negative   Cardiovascular Negative   Respiratory Negative   Gastrointestinal Negative   Genitourinary Negative   Musculoskeletal Negative   Integumentary Negative   Neurological Negative   Endocrine Negative   Other Symptoms Negative and None       Laboratory Results: No results found for this or any previous visit      Substance Abuse History:    Social History     Substance and Sexual Activity   Drug Use No       Family Psychiatric History:     Family History   Problem Relation Age of Onset    Heart disease Mother     Stroke Mother 58    COPD Mother     Arthritis Mother     Hypertension Father     Kidney disease Brother         kidney transplant    Multiple sclerosis Sister     No Known Problems Maternal Aunt     No Known Problems Maternal Uncle     No Known Problems Paternal Aunt     No Known Problems Paternal Uncle     No Known Problems Maternal Grandmother     No Known Problems Maternal Grandfather     No Known Problems Paternal Grandmother     No Known Problems Paternal Grandfather     Dislocations Sister     Neurological problems Sister     Scoliosis Sister     ADD / ADHD Neg Hx     Anesthesia problems Neg Hx     Cancer Neg Hx     Clotting disorder Neg Hx     Collagen disease Neg Hx     Diabetes Neg Hx     Dislocations Neg Hx     Learning disabilities Neg Hx     Neurological problems Neg Hx     Osteoporosis Neg Hx     Rheumatologic disease Neg Hx     Scoliosis Neg Hx     Vascular Disease Neg Hx        The following portions of the patient's history were reviewed and updated as appropriate: past family history, past medical history, past social history, past surgical history and problem list     Social History     Socioeconomic History    Marital status: /Civil Union     Spouse name: Not on file    Number of children: Not on file    Years of education: Not on file    Highest education level: Not on file   Occupational History    Not on file   Tobacco Use    Smoking status: Never Smoker    Smokeless tobacco: Never Used   Vaping Use    Vaping Use: Never used   Substance and Sexual Activity    Alcohol use: No    Drug use: No    Sexual activity: Not Currently   Other Topics Concern    Not on file   Social History Narrative    Not on file     Social Determinants of Health     Financial Resource Strain: Not on file   Food Insecurity: Not on file   Transportation Needs: Not on file   Physical Activity: Not on file   Stress: Not on file   Social Connections: Not on file   Intimate Partner Violence: Not on file   Housing Stability: Not on file     Social History     Social History Narrative    Not on file        Social History     Tobacco History     Smoking Status  Never Smoker    Smokeless Tobacco Use  Never Used          Alcohol History Alcohol Use Status  No          Drug Use     Drug Use Status  No          Sexual Activity     Sexually Active  Not Currently          Activities of Daily Living    Not Asked                     OBJECTIVE:     Mental Status Evaluation:    Appearance age appropriate, casually dressed   Behavior pleasant, cooperative   Speech normal volume, normal pitch   Mood "at peace, accomplished"   Affect Mood-congruent   Thought Processes Coherent, organized, goal-directed   Associations intact associations   Thought Content normal   Perceptual Disturbances: none   Abnormal Thoughts  Risk Potential Suicidal ideation - None  Homicidal ideation - None  Potential for aggression - No   Orientation oriented to person, place, date and situation   Memory recent and remote memory grossly intact   Consciousness alert and awake   Attention Span attention span and concentration are age appropriate   Intellect Not formally assessed   Insight intact   Judgement intact    Muscle Strength and  Gait muscle strength and tone were normal, gait not observed   Language no difficulty naming common objects   Fund of Knowledge displays adequate knowledge of current events             The patient's chart was reviewed for relevant lab reports and recent encounters with other providers  Medications, treatment progress and treatment plan were reviewed with the patient         Assessment/Plan:       Diagnoses and all orders for this visit:    Major depressive disorder, recurrent, in partial remission ([facility] )    Generalized anxiety disorder    Insomnia related to another mental disorder          Treatment Recommendations/Precautions:      Continue current medications/doses:                duloxetine 60 mg capsule - take 1 capsule by mouth every day               duloxetine 30 mg capsule - take 1 capsule by mouth every day               Note: total daily dose of duloxetine  = 90 mg              quetiapine 100 mg tablet - take 1 tablet by mouth daily at bedtime              trazodone 50 mg - take 1 tablet by mouth daily at bedtime     Continue to see Abby Magana LCSW ([address]) for psychotherapy     Risks/Benefits       Risks, Benefits And Possible Side Effects Of Medications:     Risks, benefits, and possible side effects of medications explained to patient and patient verbalizes understanding and agreement for treatment      Controlled Medication Discussion:      Not applicable

## 2022-01-18 NOTE — PATIENT INSTRUCTIONS
Continue current medications/doses:                duloxetine 60 mg capsule - take 1 capsule by mouth every day               duloxetine 30 mg capsule - take 1 capsule by mouth every day               Note: total daily dose of duloxetine  = 90 mg              quetiapine 100 mg tablet - take 1 tablet by mouth daily at bedtime              trazodone 50 mg - take 1 tablet by mouth daily at bedtime     Continue to see Marlo Henderson LCSW ([facility] [place]) for psychotherapy

## 2022-01-25 ENCOUNTER — TELEMEDICINE (OUTPATIENT)
Dept: BEHAVIORAL/MENTAL HEALTH CLINIC | Facility: CLINIC | Age: 67
End: 2022-01-25
Payer: COMMERCIAL

## 2022-01-25 ENCOUNTER — IMMUNIZATIONS (OUTPATIENT)
Dept: FAMILY MEDICINE CLINIC | Facility: HOSPITAL | Age: 67
End: 2022-01-25

## 2022-01-25 DIAGNOSIS — Z23 ENCOUNTER FOR IMMUNIZATION: Primary | ICD-10-CM

## 2022-01-25 DIAGNOSIS — F41.1 GENERALIZED ANXIETY DISORDER: ICD-10-CM

## 2022-01-25 DIAGNOSIS — F33.41 MAJOR DEPRESSIVE DISORDER, RECURRENT, IN PARTIAL REMISSION (HCC): Primary | ICD-10-CM

## 2022-01-25 PROCEDURE — 0064A COVID-19 MODERNA VACC 0.25 ML BOOSTER: CPT

## 2022-01-25 PROCEDURE — 90834 PSYTX W PT 45 MINUTES: CPT | Performed by: SOCIAL WORKER

## 2022-01-25 PROCEDURE — 91306 COVID-19 MODERNA VACC 0.25 ML BOOSTER: CPT

## 2022-01-25 NOTE — PSYCH
3Virtual Regular Visit    Verification of patient location:    Patient is located in the following state in which I hold an active license [place]      Assessment/Plan:    Problem List Items Addressed This Visit        Other    Generalized anxiety disorder    Major depressive disorder, recurrent, in partial remission ([facility] [address] ) - Primary          Goals addressed in session: Goal 1          Reason for visit is   Chief Complaint   Patient presents with    Virtual Regular Visit        Encounter provider Lissette Sun LCSW    Provider located at [address]  [place] [address]  [phone]      Recent Visits  No visits were found meeting these conditions  Showing recent visits within past 7 days and meeting all other requirements  Today's Visits  Date Type Provider Dept   01/25/22 1050 [facility] Therapist K[facility]   Showing today's visits and meeting all other requirements  Future Appointments  No visits were found meeting these conditions  Showing future appointments within next 150 days and meeting all other requirements       The patient was identified by name and date of birth  Vance Nayak was informed that this is a telemedicine visit and that the visit is being conducted throughic Embedded and patient was informed this is a secure, HIPAA-complaint platform  She agrees to proceed     My office door was closed  No one else was in the room  She acknowledged consent and understanding of privacy and security of the video platform  The patient has agreed to participate and understands they can discontinue the visit at any time  Patient is aware this is a billable service  Subjective  Vance Nayak is a 73 y o  female with depression and anxiety        HPI     Past Medical History:   Diagnosis Date    Abdominal adhesions     Anesthesia complication difficult to wake up    Anxiety     Depression     Depression     Disease of thyroid gland     Fibromyalgia     Fractures 2006    GERD (gastroesophageal reflux disease)     History of cholecystectomy 9/7/2019    Lazy eye     resolved: 3/27/17    Medical clearance for psychiatric admission 7/13/2021    Melanoma ([address] ) 2010    Osteoarthritis 7/2018    Osteopenia     with joint pain-elevated DEV    Psychiatric disorder     Stomach disorder 1995    Tinnitus     Wears glasses     for reading       Past Surgical History:   Procedure Laterality Date    ABDOMINAL SURGERY      lysis of adhesions x 2    APPENDECTOMY      CERVICAL FUSION      May 5,2021 and Jan 01,2020    CHOLECYSTECTOMY      open    DILATION AND CURETTAGE OF UTERUS      FOOT SURGERY  5/2017    NECK SURGERY  1/2019 5/2021    NEUROMA EXCISION Right 5/26/2017    Procedure: EXCISION MASS / FIBROMA FOOT;  Surgeon: Blake Clemens DPM;  Location: [address] [place] [address];  Service:    Dyann Sin PLANA VITRECTOMY W/ REPAIR OF MACULAR HOLE  12/23/2020    NE XCAPSL CTRC RMVL INSJ IO LENS PROSTH W/O ECP Left 12/6/2021    Procedure: EXTRACTION EXTRACAPSULAR CATARACT PHACO INTRAOCULAR LENS (IOL);   Surgeon: Maribel Snyder MD;  [facility]: [facility] MAIN OR;  Service: Ophthalmology    RIGHT OOPHORECTOMY      WISDOM TOOTH EXTRACTION      x4       Current Outpatient Medications   Medication Sig Dispense Refill    acetaminophen-codeine (TYLENOL #3) 300-30 mg per tablet  (Patient not taking: Reported on 12/21/2021 )      albuterol (PROVENTIL HFA,VENTOLIN HFA) 90 mcg/act inhaler Inhale 2 puffs every 4 (four) hours as needed for wheezing 1 Inhaler 0    ALPRAZolam (XANAX) 0 5 mg tablet Take 0 5 mg by mouth 2 (two) times a day as needed (Patient not taking: Reported on 12/21/2021 )      atorvastatin (LIPITOR) 20 mg tablet Take 1 tablet (20 mg total) by mouth daily 90 tablet 3    baclofen 10 mg tablet Take 1 tablet (10 mg total) by mouth as needed for muscle spasms  betamethasone valerate (VALISONE) 0 1 % cream       calcium carbonate (OS-WILLY) 1250 (500 Ca) MG tablet Take 1,250 mg by mouth      ciclopirox (LOPROX) 0 77 % cream       DULoxetine (CYMBALTA) 30 mg delayed release capsule Take 1 capsule (30 mg total) by mouth daily 90 capsule 0    DULoxetine (CYMBALTA) 60 mg delayed release capsule Take 1 capsule (60 mg total) by mouth daily 90 capsule 0    gabapentin (NEURONTIN) 300 mg capsule Take 2 capsules (600 mg total) by mouth 3 (three) times a day 180 capsule 5    gatifloxacin (ZYMAXID) 0 5 % Administer 1 drop into the left eye 2 (two) times a day  0    ketorolac (ACULAR) 0 5 % ophthalmic solution Administer 1 drop into the left eye 4 (four) times a day 5 mL 0    levothyroxine 50 mcg tablet Take 1 tablet (50 mcg total) by mouth daily 90 tablet 0    Magnesium 400 MG TABS Take by mouth daily      midodrine (PROAMATINE) 10 MG tablet Take 1 tablet (10 mg total) by mouth 3 (three) times a day 90 tablet 2    pantoprazole (PROTONIX) 40 mg tablet Take 1 tablet (40 mg total) by mouth daily 90 tablet 1    QUEtiapine (SEROquel) 100 mg tablet Take 1 tablet (100 mg total) by mouth daily at bedtime 90 tablet 0    Sodium Fluoride 5000 Sensitive 1 1-5 % GEL       traZODone (DESYREL) 50 mg tablet TAKE 1 TABLET BY MOUTH DAILY AT BEDTIME 90 tablet 0     No current facility-administered medications for this visit  Allergies   Allergen Reactions    Demerol [Meperidine] Lightheadedness    Sulfa Antibiotics Hives       Review of Systems    Video Exam    There were no vitals filed for this visit  Physical Exam     I spent 60 minutes directly with the patient during this visit    VIRTUAL VISIT DISCLAIMER    Kierra Chao verbally agrees to participate in [employer]   Pt is aware that [employer] could be limited without vital signs or the ability to perform a full hands-on physical [place] understands she or the provider may request at any time to terminate the video visit and request the patient to seek care or treatment in person  This note was not shared with the patient due to this is a psychotherapy note       Psychotherapy Provided: Individual Psychotherapy 60 minutes     Length of time in session: 60 minutes, follow up in 1 week    Encounter Diagnosis     ICD-10-CM    1  Major depressive disorder, recurrent, in partial remission (HCC)  F33 41    2  Generalized anxiety disorder  F41 1        Goals addressed in session: Goal 1     Pain:      none    0    Current suicide risk : Low     DATA: Met with Jose Manuel Liu for scheduled individual session  Reported and discussed feeling well overall and denied significant shifts in mood  Jennifer Collier reports beginning new medication for her fibromyalgia and making accommodations as needed to help modify physical needs  Discussed family problems and her desire to "rescue" others  Matt Sims connections to her upbringing, alcoholism, and gambling addictions  ASSESSMENT: Jose Manuel Liu presents with a improved mood  Her affect is normal range and intensity, appropriate  Jose Manuel Liu exhibits good therapeutic rapport with this clinician  Jose Manuel Liu continues to exhibit willingness to work on treatment goals and objectives  Jose Manuel Liu presents with a minimal risk of suicide, minimal risk of self-harm, and minimal risk of harm to others  Reviewed treatment plan  PLAN: Jose Manuel Liu will return in 2 weeks for the next scheduled session  Between sessions, Jose Manuel Liu will continue to attend AA and GA as needed, and will report back during the next session re: successes and barriers  At the next session, this clinician will use supportive psychotherapy, mindfulness-based strategies, CBT techniques, DBT-informed skills and Motivational Interviewing to address self care and stress management, in an effort to assist Jose Manuel Liu with meeting treatment goals       Behavioral Health Treatment Plan [facility]: Diagnosis and Treatment Plan explained to Felicitas Madrigal relates understanding diagnosis and is agreeable to Treatment Plan   Yes

## 2022-01-25 NOTE — BH TREATMENT PLAN
Basilia Zhu  1955       Date of Initial Treatment Plan: 8/17/21   Date of Current Treatment Plan: 1/25/2022        Treatment Plan Number 1     Strengths/Personal Resources for Self Care: "Connected with self help, have insight"    Diagnosis:   1  Major depressive disorder, recurrent, in partial remission ([facility] [address] )     2  Generalized anxiety disorder         Area of Needs: Symptom stabilization, coping with trauma symptoms      Long Term Goal 1: "Stay out of the hospital"  Target Date: 2/17/22  Completion Date: 1/25/2022         Short Term Objectives for Goal 1: Acknowledge triggers and signs of early warning of decompensation, develop and practice positive coping strategies to help mitigate daily stress, take psychotropic medication as prescribed, communicate effectively with all behavioral health treatment plan providers     REVIEW 1/25/2022: Gladys Srivastava is able to identify triggers (feeling overwhelmed, too much to do, family responsibility) and signs of early warning of decompensation (keeping secrets, feeling     shameful, crying spells)  She practices positive coping strategies to help mitigate stress, such as: reaching out to family & friends as needed, attending GA, working, practicing                 mindfulness, and weighing pros and cons  She reports taking medication as prescribed and has followed up with treatment providers as needed  As such, this goal is met  She has not     been re-hospitalized  Her needs will be assessed ongoing  Long Term Goal 2: "Deal with my trauma" and repair sense of self in context of family relationships      Target Date: 2/17/22  Completion Date: N/A    Short Term Objectives for Goal 2: Identify residual effects of trauma and connect how this relates to current day behaviors and decisions, identify unmet needs, develop and practice positive coping which instills a sense of safety and calm     REVIEW 1/25/2022: Gladys Srivastava verbalizes residual effects of trauma, such as: poor impulse control and people pleasing tendencies  She needs to continue to work on setting boundaries with her family  GOAL 1: Modality: Individual 4x per month   Completion Date 1/25/2022    GOAL 2: Modality: Individual 4x per month   Completion Date 7/25/2022 and The person(s) responsible for carrying out the plan is  the client and this writer  Behavioral Health Treatment Plan [facility]: Diagnosis and Treatment Plan explained to Kemal Field relates understanding diagnosis and is agreeable to Treatment Plan  Client Comments : Please share your thoughts, feelings, need and/or experiences regarding your treatment plan: "I'm looking forward to the work"  This treatment plan was created between this clinician and this client on 1/25/2022  Due to the current COVID-19 precautions, a physical signature was not obtained as the appointment was done remotely  The client provided verbal consent for this treatment plan

## 2022-02-01 LAB
ALBUMIN SERPL-MCNC: 4.1 G/DL (ref 3.8–4.8)
ALBUMIN/GLOB SERPL: 1.7 {RATIO} (ref 1.2–2.2)
ALP SERPL-CCNC: 84 IU/L (ref 44–121)
ALT SERPL-CCNC: 22 IU/L (ref 0–32)
AST SERPL-CCNC: 20 IU/L (ref 0–40)
BILIRUB SERPL-MCNC: 0.3 MG/DL (ref 0–1.2)
BUN SERPL-MCNC: 18 MG/DL (ref 8–27)
BUN/CREAT SERPL: 18 (ref 12–28)
CALCIUM SERPL-MCNC: 9.5 MG/DL (ref 8.7–10.3)
CHLORIDE SERPL-SCNC: 106 MMOL/L (ref 96–106)
CHOLEST SERPL-MCNC: 162 MG/DL (ref 100–199)
CHOLEST/HDLC SERPL: 3.1 RATIO (ref 0–4.4)
CO2 SERPL-SCNC: 22 MMOL/L (ref 20–29)
CREAT SERPL-MCNC: 0.99 MG/DL (ref 0.57–1)
GLOBULIN SER-MCNC: 2.4 G/DL (ref 1.5–4.5)
GLUCOSE SERPL-MCNC: 105 MG/DL (ref 65–99)
HDLC SERPL-MCNC: 53 MG/DL
LDLC SERPL CALC-MCNC: 91 MG/DL (ref 0–99)
MICRODELETION SYND BLD/T FISH: NORMAL
POTASSIUM SERPL-SCNC: 4.8 MMOL/L (ref 3.5–5.2)
PROT SERPL-MCNC: 6.5 G/DL (ref 6–8.5)
SL AMB EGFR AFRICAN AMERICAN: 69 ML/MIN/1.73
SL AMB EGFR NON AFRICAN AMERICAN: 60 ML/MIN/1.73
SL AMB VLDL CHOLESTEROL CALC: 18 MG/DL (ref 5–40)
SODIUM SERPL-SCNC: 142 MMOL/L (ref 134–144)
TRIGL SERPL-MCNC: 101 MG/DL (ref 0–149)
TSH SERPL DL<=0.005 MIU/L-ACNC: 2.8 UIU/ML (ref 0.45–4.5)

## 2022-02-05 ENCOUNTER — HOSPITAL ENCOUNTER (EMERGENCY)
Facility: HOSPITAL | Age: 67
Discharge: HOME | End: 2022-02-05
Attending: EMERGENCY MEDICINE | Admitting: EMERGENCY MEDICINE
Payer: COMMERCIAL

## 2022-02-05 VITALS
RESPIRATION RATE: 20 BRPM | SYSTOLIC BLOOD PRESSURE: 144 MMHG | TEMPERATURE: 98.8 F | DIASTOLIC BLOOD PRESSURE: 99 MMHG | HEART RATE: 101 BPM | OXYGEN SATURATION: 99 %

## 2022-02-05 DIAGNOSIS — M54.2 NECK PAIN: Primary | ICD-10-CM

## 2022-02-05 DIAGNOSIS — M79.606 LEG PAIN: ICD-10-CM

## 2022-02-05 PROCEDURE — 99284 EMERGENCY DEPT VISIT MOD MDM: CPT | Performed by: EMERGENCY MEDICINE

## 2022-02-05 PROCEDURE — 96372 THER/PROPH/DIAG INJ SC/IM: CPT

## 2022-02-05 PROCEDURE — 99283 EMERGENCY DEPT VISIT LOW MDM: CPT

## 2022-02-05 RX ORDER — BACLOFEN 10 MG/1
20 TABLET ORAL ONCE
Status: COMPLETED | OUTPATIENT
Start: 2022-02-05 | End: 2022-02-05

## 2022-02-05 RX ORDER — PREDNISONE 20 MG/1
40 TABLET ORAL ONCE
Status: COMPLETED | OUTPATIENT
Start: 2022-02-05 | End: 2022-02-05

## 2022-02-05 RX ORDER — BACLOFEN 20 MG/1
20 TABLET ORAL 3 TIMES DAILY
Qty: 20 TABLET | Refills: 0 | Status: SHIPPED | OUTPATIENT
Start: 2022-02-05 | End: 2022-02-24 | Stop reason: ALTCHOICE

## 2022-02-05 RX ORDER — PREDNISONE 20 MG/1
20 TABLET ORAL 2 TIMES DAILY
Qty: 20 TABLET | Refills: 0 | Status: SHIPPED | OUTPATIENT
Start: 2022-02-05 | End: 2022-02-10

## 2022-02-05 RX ORDER — HYDROMORPHONE HCL/PF 1 MG/ML
1 SYRINGE (ML) INJECTION ONCE
Status: COMPLETED | OUTPATIENT
Start: 2022-02-05 | End: 2022-02-05

## 2022-02-05 RX ADMIN — BACLOFEN 20 MG: 10 TABLET ORAL at 12:21

## 2022-02-05 RX ADMIN — PREDNISONE 40 MG: 20 TABLET ORAL at 12:20

## 2022-02-05 RX ADMIN — HYDROMORPHONE HYDROCHLORIDE 1 MG: 1 INJECTION, SOLUTION INTRAMUSCULAR; INTRAVENOUS; SUBCUTANEOUS at 12:22

## 2022-02-05 NOTE — ED PROVIDER NOTES
History  Chief Complaint   Patient presents with    Neck Pain     states had a spinal fusion in may, over past couple of weeks having intermittent R arm pain, now radiating up neck and into head    Leg Pain     c/o intermittent pins and needles to R lower leg a couple of days ago     Patient has a history of fibromyalgia  She also has undergone cervical spinal fusion in May  She states she has not been well for weeks  Patient has seen her rheumatologist and had a series of blood tests done without results  Patient states she has been getting cramps in her right leg as well as paresthesias in the left leg  She gets pain in both arms with tingling sensation on the pinky of the right hand  She thinks the right arm is generally weaker than the left but not focally  She has had no fever  Today she woke with more pain in her right side of her neck and became concerned  She has not followed up with her surgeon          Prior to Admission Medications   Prescriptions Last Dose Informant Patient Reported? Taking?    ALPRAZolam (XANAX) 0 5 mg tablet   Yes No   Sig: Take 0 5 mg by mouth 2 (two) times a day as needed   Patient not taking: Reported on 12/21/2021    DULoxetine (CYMBALTA) 30 mg delayed release capsule   No No   Sig: Take 1 capsule (30 mg total) by mouth daily   DULoxetine (CYMBALTA) 60 mg delayed release capsule   No No   Sig: Take 1 capsule (60 mg total) by mouth daily   Magnesium 400 MG TABS   Yes No   Sig: Take by mouth daily   Methocarbamol (ROBAXIN PO)   Yes Yes   Sig: Take by mouth as needed   QUEtiapine (SEROquel) 100 mg tablet   No No   Sig: Take 1 tablet (100 mg total) by mouth daily at bedtime   Sodium Fluoride 5000 Sensitive 1 1-5 % GEL   Yes No   acetaminophen-codeine (TYLENOL #3) 300-30 mg per tablet   Yes No   Patient not taking: Reported on 12/21/2021    albuterol (PROVENTIL HFA,VENTOLIN HFA) 90 mcg/act inhaler   No No   Sig: Inhale 2 puffs every 4 (four) hours as needed for wheezing atorvastatin (LIPITOR) 20 mg tablet   No No   Sig: Take 1 tablet (20 mg total) by mouth daily   baclofen 10 mg tablet   No No   Sig: Take 1 tablet (10 mg total) by mouth as needed for muscle spasms   betamethasone valerate (VALISONE) 0 1 % cream   Yes No   calcium carbonate (OS-WILLY) 1250 (500 Ca) MG tablet   Yes No   Sig: Take 1,250 mg by mouth   ciclopirox (LOPROX) 0 77 % cream   Yes No   gabapentin (NEURONTIN) 300 mg capsule   No No   Sig: Take 2 capsules (600 mg total) by mouth 3 (three) times a day   gatifloxacin (ZYMAXID) 0 5 %   No No   Sig: Administer 1 drop into the left eye 2 (two) times a day   ketorolac (ACULAR) 0 5 % ophthalmic solution   No No   Sig: Administer 1 drop into the left eye 4 (four) times a day   levothyroxine 50 mcg tablet   No No   Sig: Take 1 tablet (50 mcg total) by mouth daily   midodrine (PROAMATINE) 10 MG tablet   No No   Sig: Take 1 tablet (10 mg total) by mouth 3 (three) times a day   pantoprazole (PROTONIX) 40 mg tablet   No No   Sig: Take 1 tablet (40 mg total) by mouth daily   traZODone (DESYREL) 50 mg tablet   No No   Sig: TAKE 1 TABLET BY MOUTH DAILY AT BEDTIME      Facility-Administered Medications: None       Past Medical History:   Diagnosis Date    Abdominal adhesions     Anesthesia complication     difficult to wake up    Anxiety     Depression     Depression     Disease of thyroid gland     Fibromyalgia     Fractures 2006    GERD (gastroesophageal reflux disease)     History of cholecystectomy 9/7/2019    Lazy eye     resolved: 3/27/17    Medical clearance for psychiatric admission 7/13/2021    Melanoma ([facility] [address] ) 2010    Osteoarthritis 7/2018    Osteopenia     with joint pain-elevated DEV    Psychiatric disorder     Stomach disorder 1995    Tinnitus     Wears glasses     for reading       Past Surgical History:   Procedure Laterality Date    ABDOMINAL SURGERY      lysis of adhesions x 2    APPENDECTOMY      CERVICAL FUSION      May 5,2021 and Jan  10,4365    CHOLECYSTECTOMY      open    DILATION AND CURETTAGE OF UTERUS      FOOT SURGERY  5/2017    NECK SURGERY  1/2019 5/2021    NEUROMA EXCISION Right 5/26/2017    Procedure: EXCISION MASS / FIBROMA FOOT;  Surgeon: Clarisa Rodriguez DPM;  Location: [facility];  Service:     PARS PLANA VITRECTOMY W/ REPAIR OF MACULAR HOLE  12/23/2020    KS XCAPSL CTRC RMVL INSJ IO LENS PROSTH W/O ECP Left 12/6/2021    Procedure: EXTRACTION EXTRACAPSULAR CATARACT PHACO INTRAOCULAR LENS (IOL); Surgeon: Rahel Oshea MD;  Location: [facility] MAIN OR;  Service: Ophthalmology    RIGHT OOPHORECTOMY      WISDOM TOOTH EXTRACTION      x4       Family History   Problem Relation Age of Onset    Heart disease Mother     Stroke Mother 58    COPD Mother     Arthritis Mother     Hypertension Father     Kidney disease Brother         kidney transplant    Multiple sclerosis Sister     No Known Problems Maternal Aunt     No Known Problems Maternal Uncle     No Known Problems Paternal Aunt     No Known Problems Paternal Uncle     No Known Problems Maternal Grandmother     No Known Problems Maternal Grandfather     No Known Problems Paternal Grandmother     No Known Problems Paternal Grandfather     Dislocations Sister     Neurological problems Sister     Scoliosis Sister     ADD / ADHD Neg Hx     Anesthesia problems Neg Hx     Cancer Neg Hx     Clotting disorder Neg Hx     Collagen disease Neg Hx     Diabetes Neg Hx     Dislocations Neg Hx     Learning disabilities Neg Hx     Neurological problems Neg Hx     Osteoporosis Neg Hx     Rheumatologic disease Neg Hx     Scoliosis Neg Hx     Vascular Disease Neg Hx      I have reviewed and agree with the history as documented      E-Cigarette/Vaping    E-Cigarette Use Never User      E-Cigarette/Vaping Substances    Nicotine No     THC No     CBD No     Flavoring No     Other No     Unknown No      Social History     Tobacco Use    Smoking status: Never Smoker  Smokeless tobacco: Never Used   Vaping Use    Vaping Use: Never used   Substance Use Topics    Alcohol use: No    Drug use: No       Review of Systems   Constitutional: Negative for fever  HENT: Negative for congestion and sore throat  Eyes: Negative for visual disturbance  Respiratory: Negative for shortness of breath  Cardiovascular: Negative for chest pain  Gastrointestinal: Negative for abdominal pain and vomiting  Genitourinary: Negative for dysuria  Musculoskeletal: Positive for arthralgias, back pain, myalgias and neck pain  Skin: Negative for rash  Neurological: Positive for weakness, numbness and headaches  Hematological: Does not bruise/bleed easily  Psychiatric/Behavioral: The patient is nervous/anxious  All other systems reviewed and are negative  Physical Exam  Physical Exam  Vitals and nursing note reviewed  Constitutional:       Appearance: Normal appearance  HENT:      Head: Normocephalic  Right Ear: External ear normal       Left Ear: External ear normal       Nose: Nose normal       Mouth/Throat:      Pharynx: Oropharynx is clear  Eyes:      Conjunctiva/sclera: Conjunctivae normal    Neck:      Comments: Area of muscle spasm is noted on the right trapezius which is tender to palpation  Cardiovascular:      Rate and Rhythm: Normal rate and regular rhythm  Pulses: Normal pulses  Pulmonary:      Effort: Pulmonary effort is normal       Breath sounds: Normal breath sounds  Abdominal:      Palpations: Abdomen is soft  Tenderness: There is no abdominal tenderness  Musculoskeletal:         General: Normal range of motion  Cervical back: Normal range of motion  Tenderness present  Skin:     General: Skin is warm  Neurological:      General: No focal deficit present  Mental Status: She is alert  Sensory: No sensory deficit  Motor: No weakness     Psychiatric:         Mood and Affect: Mood normal          Behavior: Behavior normal          Vital Signs  ED Triage Vitals [02/05/22 1127]   Temperature Pulse Respirations Blood Pressure SpO2   98 8 °F (37 1 °C) 101 20 144/99 99 %      Temp Source Heart Rate Source Patient Position - Orthostatic VS BP Location FiO2 (%)   Tympanic Monitor Sitting Left arm --      Pain Score       7           Vitals:    02/05/22 1127   BP: 144/99   Pulse: 101   Patient Position - Orthostatic VS: Sitting         Visual Acuity      ED Medications  Medications   HYDROmorphone (DILAUDID) injection 1 mg (1 mg Intramuscular Given 2/5/22 1222)   predniSONE tablet 40 mg (40 mg Oral Given 2/5/22 1220)   baclofen tablet 20 mg (20 mg Oral Given 2/5/22 1221)       Diagnostic Studies  Results Reviewed     None                 No orders to display              Procedures  Procedures         ED Course                               SBIRT 22yo+      Most Recent Value   SBIRT (22 yo +)    In order to provide better care to our patients, we are screening all of our patients for alcohol and drug use  Would it be okay to ask you these screening questions? No Filed at: 02/05/2022 1210                    [facility]  Number of Diagnoses or Management Options  Leg pain  Neck pain  Diagnosis management comments: Patient has multiple symptoms on upper and lower extremities as well as left and right  I suspect systemic problem as opposed to focal neurological   Pain on the right side of neck does not appear to be radiculopathy from surgery although scar tissue may be involved  Will treat presumptively with muscle relaxers and steroids    Patient should follow-up with her surgeon      Disposition  Final diagnoses:   Neck pain   Leg pain     Time reflects when diagnosis was documented in both MDM as applicable and the Disposition within this note     Time User Action Codes Description Comment    2/5/2022 12:12 PM Tari Lange Add [M54 2] Neck pain     2/5/2022 12:13 PM Lenin HALEY Add [M79 606] Leg pain       ED Disposition ED Disposition Condition Date/Time Comment    Discharge Stable Sat Feb 5, 2022 12:17 PM Scott Diaz discharge to home/self care  Follow-up Information     Follow up With Specialties Details Why Contact Info    Marlo Ng MD Internal Medicine, Family Medicine   [address] [facility]  [address] [place] [address]  [phone]      Your surgeon              Patient's Medications   Discharge Prescriptions    BACLOFEN 20 MG TABLET    Take 1 tablet (20 mg total) by mouth 3 (three) times a day for 7 days       Start Date: 2/5/2022  End Date: 2/12/2022       Order Dose: 20 mg       Quantity: 20 tablet    Refills: 0    PREDNISONE 20 MG TABLET    Take 1 tablet (20 mg total) by mouth 2 (two) times a day for 10 days       Start Date: 2/5/2022  End Date: 2/15/2022       Order Dose: 20 mg       Quantity: 20 tablet    Refills: 0       No discharge procedures on file      PDMP Review       Value Time User    PDMP Reviewed  Yes 9/13/2021  6:56 PM YURI Waldron          ED Provider  Electronically Signed by           Shannen Boone MD  02/05/22 0175

## 2022-02-08 ENCOUNTER — TELEPHONE (OUTPATIENT)
Dept: FAMILY MEDICINE CLINIC | Facility: CLINIC | Age: 67
End: 2022-02-08

## 2022-02-08 ENCOUNTER — APPOINTMENT (EMERGENCY)
Dept: RADIOLOGY | Facility: HOSPITAL | Age: 67
End: 2022-02-08
Payer: COMMERCIAL

## 2022-02-08 ENCOUNTER — HOSPITAL ENCOUNTER (EMERGENCY)
Facility: HOSPITAL | Age: 67
Discharge: HOME | End: 2022-02-08
Attending: EMERGENCY MEDICINE | Admitting: EMERGENCY MEDICINE
Payer: COMMERCIAL

## 2022-02-08 VITALS
DIASTOLIC BLOOD PRESSURE: 92 MMHG | HEIGHT: 65 IN | WEIGHT: 163.4 LBS | OXYGEN SATURATION: 99 % | HEART RATE: 92 BPM | BODY MASS INDEX: 27.22 KG/M2 | SYSTOLIC BLOOD PRESSURE: 167 MMHG | TEMPERATURE: 97.9 F | RESPIRATION RATE: 20 BRPM

## 2022-02-08 DIAGNOSIS — R07.9 CHEST PAIN: ICD-10-CM

## 2022-02-08 DIAGNOSIS — M54.2 NECK PAIN: Primary | ICD-10-CM

## 2022-02-08 DIAGNOSIS — R20.0 HAND NUMBNESS: ICD-10-CM

## 2022-02-08 LAB
ALBUMIN SERPL BCP-MCNC: 3.6 G/DL (ref 3.5–5)
ALP SERPL-CCNC: 88 U/L (ref 46–116)
ALT SERPL W P-5'-P-CCNC: 49 U/L (ref 12–78)
ANION GAP SERPL CALCULATED.3IONS-SCNC: 7 MMOL/L (ref 4–13)
AST SERPL W P-5'-P-CCNC: 30 U/L (ref 5–45)
ATRIAL RATE: 109 BPM
BASOPHILS # BLD AUTO: 0.02 THOUSANDS/ΜL (ref 0–0.1)
BASOPHILS NFR BLD AUTO: 0 % (ref 0–1)
BILIRUB SERPL-MCNC: 0.35 MG/DL (ref 0.2–1)
BUN SERPL-MCNC: 15 MG/DL (ref 5–25)
CALCIUM SERPL-MCNC: 8.5 MG/DL (ref 8.3–10.1)
CARDIAC TROPONIN I PNL SERPL HS: 4 NG/L
CHLORIDE SERPL-SCNC: 103 MMOL/L (ref 100–108)
CO2 SERPL-SCNC: 26 MMOL/L (ref 21–32)
CREAT SERPL-MCNC: 0.91 MG/DL (ref 0.6–1.3)
EOSINOPHIL # BLD AUTO: 0 THOUSAND/ΜL (ref 0–0.61)
EOSINOPHIL NFR BLD AUTO: 0 % (ref 0–6)
ERYTHROCYTE [DISTWIDTH] IN BLOOD BY AUTOMATED COUNT: 14.3 % (ref 11.6–15.1)
GFR SERPL CREATININE-BSD FRML MDRD: 66 ML/MIN/1.73SQ M
GLUCOSE SERPL-MCNC: 191 MG/DL (ref 65–140)
HCT VFR BLD AUTO: 41.2 % (ref 34.8–46.1)
HGB BLD-MCNC: 12.9 G/DL (ref 11.5–15.4)
IMM GRANULOCYTES # BLD AUTO: 0.13 THOUSAND/UL (ref 0–0.2)
IMM GRANULOCYTES NFR BLD AUTO: 1 % (ref 0–2)
LYMPHOCYTES # BLD AUTO: 1.07 THOUSANDS/ΜL (ref 0.6–4.47)
LYMPHOCYTES NFR BLD AUTO: 9 % (ref 14–44)
MCH RBC QN AUTO: 27.1 PG (ref 26.8–34.3)
MCHC RBC AUTO-ENTMCNC: 31.3 G/DL (ref 31.4–37.4)
MCV RBC AUTO: 87 FL (ref 82–98)
MONOCYTES # BLD AUTO: 0.37 THOUSAND/ΜL (ref 0.17–1.22)
MONOCYTES NFR BLD AUTO: 3 % (ref 4–12)
NEUTROPHILS # BLD AUTO: 10.16 THOUSANDS/ΜL (ref 1.85–7.62)
NEUTS SEG NFR BLD AUTO: 87 % (ref 43–75)
NRBC BLD AUTO-RTO: 0 /100 WBCS
P AXIS: 66 DEGREES
PLATELET # BLD AUTO: 311 THOUSANDS/UL (ref 149–390)
PMV BLD AUTO: 10.1 FL (ref 8.9–12.7)
POTASSIUM SERPL-SCNC: 4.2 MMOL/L (ref 3.5–5.3)
PR INTERVAL: 156 MS
PROT SERPL-MCNC: 7.3 G/DL (ref 6.4–8.2)
QRS AXIS: -27 DEGREES
QRSD INTERVAL: 82 MS
QT INTERVAL: 334 MS
QTC INTERVAL: 449 MS
RBC # BLD AUTO: 4.76 MILLION/UL (ref 3.81–5.12)
SODIUM SERPL-SCNC: 136 MMOL/L (ref 136–145)
T WAVE AXIS: 59 DEGREES
VENTRICULAR RATE: 109 BPM
WBC # BLD AUTO: 11.75 THOUSAND/UL (ref 4.31–10.16)

## 2022-02-08 PROCEDURE — 36415 COLL VENOUS BLD VENIPUNCTURE: CPT | Performed by: EMERGENCY MEDICINE

## 2022-02-08 PROCEDURE — 84484 ASSAY OF TROPONIN QUANT: CPT | Performed by: EMERGENCY MEDICINE

## 2022-02-08 PROCEDURE — 80053 COMPREHEN METABOLIC PANEL: CPT | Performed by: EMERGENCY MEDICINE

## 2022-02-08 PROCEDURE — G1004 CDSM NDSC: HCPCS

## 2022-02-08 PROCEDURE — 74174 CTA ABD&PLVS W/CONTRAST: CPT

## 2022-02-08 PROCEDURE — 71275 CT ANGIOGRAPHY CHEST: CPT

## 2022-02-08 PROCEDURE — 93005 ELECTROCARDIOGRAM TRACING: CPT

## 2022-02-08 PROCEDURE — 85025 COMPLETE CBC W/AUTO DIFF WBC: CPT | Performed by: EMERGENCY MEDICINE

## 2022-02-08 PROCEDURE — 99284 EMERGENCY DEPT VISIT MOD MDM: CPT | Performed by: EMERGENCY MEDICINE

## 2022-02-08 PROCEDURE — 99285 EMERGENCY DEPT VISIT HI MDM: CPT

## 2022-02-08 PROCEDURE — 93010 ELECTROCARDIOGRAM REPORT: CPT | Performed by: INTERNAL MEDICINE

## 2022-02-08 RX ADMIN — IOHEXOL 100 ML: 350 INJECTION, SOLUTION INTRAVENOUS at 15:00

## 2022-02-08 NOTE — DISCHARGE INSTRUCTIONS
Imaging of her chest as well as lab work and EKG today were all normal   Continue previously prescribed symptomatic management  If symptoms worsen, if pain worsens, if you have significant shortness of breath or worsening numbness, return to the emergency department  Please follow-up with your primary care provider in the next week for further evaluation

## 2022-02-08 NOTE — TELEPHONE ENCOUNTER
Talked to patient, she's getting sharp pain down her spine, neck/jaw pain, tingling in her finger tips (like a shock)  She went to ED yesterday with some of these SX, released on baclofen and prednisone, started both  Still having SX, she called surgeon who did back fusion and they told her to call here  Discussed with Dr Miladys Dai and he said to go back to the ED, pt agreed    MARIFER Fernandez

## 2022-02-09 ENCOUNTER — TELEMEDICINE (OUTPATIENT)
Dept: BEHAVIORAL/MENTAL HEALTH CLINIC | Facility: CLINIC | Age: 67
End: 2022-02-09
Payer: COMMERCIAL

## 2022-02-09 DIAGNOSIS — F41.1 GENERALIZED ANXIETY DISORDER: ICD-10-CM

## 2022-02-09 DIAGNOSIS — F33.41 MAJOR DEPRESSIVE DISORDER, RECURRENT, IN PARTIAL REMISSION (HCC): Primary | ICD-10-CM

## 2022-02-09 PROCEDURE — 90834 PSYTX W PT 45 MINUTES: CPT | Performed by: SOCIAL WORKER

## 2022-02-09 NOTE — PSYCH
Virtual Regular Visit    Verification of patient location:    Patient is located in the following state in which I hold an active license [place]      Assessment/Plan:    Problem List Items Addressed This Visit        Other    Generalized anxiety disorder    Major depressive disorder, recurrent, in partial remission ([facility] [address] ) - Primary          Goals addressed in session: Goal 1 and Goal 2          Reason for visit is   Chief Complaint   Patient presents with    Virtual Regular Visit        Encounter provider Liss Busch LCSW    Provider located at [address] [place] [address]  [address]  [place] [address]  [phone]      Recent Visits  Date Type Provider Dept   02/08/22 Telephone MD Dakotah Sibley recent visits within past 7 days and meeting all other requirements  Future Appointments  No visits were found meeting these conditions  Showing future appointments within next 150 days and meeting all other requirements       The patient was identified by name and date of birth  Brandon Matos was informed that this is a telemedicine visit and that the visit is being conducted throughEpic Embedded and patient was informed this is a secure, HIPAA-complaint platform  She agrees to proceed     My office door was closed  No one else was in the room  She acknowledged consent and understanding of privacy and security of the video platform  The patient has agreed to participate and understands they can discontinue the visit at any time  Patient is aware this is a billable service  Subjective  Brandon Matos is a 73 y o  female        HPI     Past Medical History:   Diagnosis Date    Abdominal adhesions     Anesthesia complication     difficult to wake up    Anxiety     Depression     Depression     Disease of thyroid gland     Fibromyalgia     Fractures 2006    GERD (gastroesophageal reflux disease)     History of cholecystectomy 9/7/2019    Lazy eye     resolved: 3/27/17    Medical clearance for psychiatric admission 7/13/2021    Melanoma ([address] ) 2010    Osteoarthritis 7/2018    Osteopenia     with joint pain-elevated DEV    Psychiatric disorder     Stomach disorder 1995    Tinnitus     Wears glasses     for reading       Past Surgical History:   Procedure Laterality Date    ABDOMINAL SURGERY      lysis of adhesions x 2    APPENDECTOMY      CERVICAL FUSION      May 5,2021 and Jan 01,2020    CHOLECYSTECTOMY      open    DILATION AND CURETTAGE OF UTERUS      FOOT SURGERY  5/2017    NECK SURGERY  1/2019 5/2021    NEUROMA EXCISION Right 5/26/2017    Procedure: EXCISION MASS / FIBROMA FOOT;  Surgeon: Vitaliy Whittington DPM;  Location: [address] [place] [address];  Service:    Domi Holler PLANA VITRECTOMY W/ REPAIR OF MACULAR HOLE  12/23/2020    CO XCAPSL CTRC RMVL INSJ IO LENS PROSTH W/O ECP Left 12/6/2021    Procedure: EXTRACTION EXTRACAPSULAR CATARACT PHACO INTRAOCULAR LENS (IOL);   Surgeon: Alva Hassan MD;  Location: [facility] MAIN OR;  Service: Ophthalmology    RIGHT OOPHORECTOMY      WISDOM TOOTH EXTRACTION      x4       Current Outpatient Medications   Medication Sig Dispense Refill    acetaminophen-codeine (TYLENOL #3) 300-30 mg per tablet  (Patient not taking: Reported on 12/21/2021 )      albuterol (PROVENTIL HFA,VENTOLIN HFA) 90 mcg/act inhaler Inhale 2 puffs every 4 (four) hours as needed for wheezing 1 Inhaler 0    ALPRAZolam (XANAX) 0 5 mg tablet Take 0 5 mg by mouth 2 (two) times a day as needed (Patient not taking: Reported on 12/21/2021 )      atorvastatin (LIPITOR) 20 mg tablet Take 1 tablet (20 mg total) by mouth daily 90 tablet 3    baclofen 10 mg tablet Take 1 tablet (10 mg total) by mouth as needed for muscle spasms      baclofen 20 mg tablet Take 1 tablet (20 mg total) by mouth 3 (three) times a day for 7 days 20 tablet 0    betamethasone valerate (VALISONE) 0 1 % cream       calcium carbonate (OS-WILLY) 1250 (500 Ca) MG tablet Take 1,250 mg by mouth      ciclopirox (LOPROX) 0 77 % cream       DULoxetine (CYMBALTA) 30 mg delayed release capsule Take 1 capsule (30 mg total) by mouth daily 90 capsule 0    DULoxetine (CYMBALTA) 60 mg delayed release capsule Take 1 capsule (60 mg total) by mouth daily 90 capsule 0    gabapentin (NEURONTIN) 300 mg capsule Take 2 capsules (600 mg total) by mouth 3 (three) times a day 180 capsule 5    gatifloxacin (ZYMAXID) 0 5 % Administer 1 drop into the left eye 2 (two) times a day  0    ketorolac (ACULAR) 0 5 % ophthalmic solution Administer 1 drop into the left eye 4 (four) times a day 5 mL 0    levothyroxine 50 mcg tablet Take 1 tablet (50 mcg total) by mouth daily 90 tablet 0    Magnesium 400 MG TABS Take by mouth daily      Methocarbamol (ROBAXIN PO) Take by mouth as needed (Patient not taking: Reported on 2/8/2022 )      midodrine (PROAMATINE) 10 MG tablet Take 1 tablet (10 mg total) by mouth 3 (three) times a day 90 tablet 2    pantoprazole (PROTONIX) 40 mg tablet Take 1 tablet (40 mg total) by mouth daily 90 tablet 1    predniSONE 20 mg tablet Take 1 tablet (20 mg total) by mouth 2 (two) times a day for 10 days 20 tablet 0    QUEtiapine (SEROquel) 100 mg tablet Take 1 tablet (100 mg total) by mouth daily at bedtime 90 tablet 0    Sodium Fluoride 5000 Sensitive 1 1-5 % GEL       traZODone (DESYREL) 50 mg tablet TAKE 1 TABLET BY MOUTH DAILY AT BEDTIME 90 tablet 0     No current facility-administered medications for this visit  Allergies   Allergen Reactions    Demerol [Meperidine] Lightheadedness    Sulfa Antibiotics Hives       Review of Systems    Video Exam    There were no vitals filed for this visit  Physical Exam     I spent 45 minutes directly with the patient during this visit    VIRTUAL VISIT DISCLAIMER    Alfonzo Morales verbally agrees to participate in [employer]   Pt is aware that [employer] could be limited without vital signs or the ability to perform a full hands-on physical Trisha Pitt understands she or the provider may request at any time to terminate the video visit and request the patient to seek care or treatment in person  This note was not shared with the patient due to this is a psychotherapy note     Psychotherapy Provided: Individual Psychotherapy 45 minutes     Length of time in session: 45 minutes, follow up in 2 week    Encounter Diagnosis     ICD-10-CM    1  Major depressive disorder, recurrent, in partial remission (HCC)  F33 41    2  Generalized anxiety disorder  F41 1        Goals addressed in session: Goal 1 and Goal 2     Pain:      none    0    Current suicide risk : Low     DATA: Met with Lai Bey for scheduled individual session  Reported elevated levels of stress due to being triggered yesterday by her sister and granddaughter, experiencing intense physical pain, which then led to her going to the hospital to being evaluated for a possible heart attack  We worked through her triggers, paying particular attention to her not feeling valued, and discussed future ways that she can take care of herself  ASSESSMENT: Lai Bey presents with a anxious mood  Her affect is normal range and intensity, appropriate  Lai Bey exhibits good therapeutic rapport with this clinician  Lai Bey continues to exhibit willingness to work on treatment goals and objectives  Lai Bey presents with a minimal risk of suicide, minimal risk of self-harm, and minimal risk of harm to others  PLAN: Lai Bey will return in 2 weeks for the next scheduled session  Between sessions, Lai Bey will practice positive coping skills and will report back during the next session re: successes and barriers   At the next session, this clinician will use supportive psychotherapy, mindfulness-based strategies and CBT techniques to address managing anxiety and difficult emotions, in an effort to assist Harvey Ascencio with meeting treatment goals  Behavioral Health Treatment Plan [employer] Pending sale to [facility]: Diagnosis and Treatment Plan explained to [place] relates understanding diagnosis and is agreeable to Treatment Plan   Yes

## 2022-02-09 NOTE — ED PROVIDER NOTES
History  Chief Complaint   Patient presents with    Chest Pain     patient reports throbbing chest pain last night, also reports "pulsating" in neck to chest to spine  Reports feeling back tightening  HPI  Patient is a 73year old female pmh anxiety, fibromyalgia, GERD, chronic neck pain now status post cervical fusion presenting for evaluation of right sided chest pain, neck pain, palpitations and throbbing sensation in neck starting previous evening  Patient states pain worsened by turning neck and states component of neck tension and stiffness  Patient states intermittent tingling bilaterally in hands without weakness, not exacerbated by head movement, not present in ED  Patient denies exertional component of chest pain, denies diaphoresis, syncope, near syncope, denies associated shortness of breath, denies prior cardiac history  Patient denies fevers, chills, cough, recent travel or sick contacts  Prior to Admission Medications   Prescriptions Last Dose Informant Patient Reported? Taking?    ALPRAZolam (XANAX) 0 5 mg tablet Not Taking at Unknown time  Yes No   Sig: Take 0 5 mg by mouth 2 (two) times a day as needed   Patient not taking: Reported on 12/21/2021    DULoxetine (CYMBALTA) 30 mg delayed release capsule 2/7/2022 at Unknown time  No Yes   Sig: Take 1 capsule (30 mg total) by mouth daily   DULoxetine (CYMBALTA) 60 mg delayed release capsule 2/8/2022 at Unknown time  No Yes   Sig: Take 1 capsule (60 mg total) by mouth daily   Magnesium 400 MG TABS   Yes No   Sig: Take by mouth daily   Methocarbamol (ROBAXIN PO) Not Taking at Unknown time  Yes No   Sig: Take by mouth as needed   Patient not taking: Reported on 2/8/2022    QUEtiapine (SEROquel) 100 mg tablet 2/7/2022 at Unknown time  No Yes   Sig: Take 1 tablet (100 mg total) by mouth daily at bedtime   Sodium Fluoride 5000 Sensitive 1 1-5 % GEL   Yes No   acetaminophen-codeine (TYLENOL #3) 300-30 mg per tablet Not Taking at Unknown time  Yes No Patient not taking: Reported on 12/21/2021    albuterol (PROVENTIL HFA,VENTOLIN HFA) 90 mcg/act inhaler More than a month at Unknown time  No No   Sig: Inhale 2 puffs every 4 (four) hours as needed for wheezing   atorvastatin (LIPITOR) 20 mg tablet 2/7/2022 at Unknown time  No Yes   Sig: Take 1 tablet (20 mg total) by mouth daily   baclofen 10 mg tablet 2/8/2022 at Unknown time  No Yes   Sig: Take 1 tablet (10 mg total) by mouth as needed for muscle spasms   baclofen 20 mg tablet 2/8/2022 at Unknown time  No Yes   Sig: Take 1 tablet (20 mg total) by mouth 3 (three) times a day for 7 days   betamethasone valerate (VALISONE) 0 1 % cream   Yes No   calcium carbonate (OS-WILLY) 1250 (500 Ca) MG tablet   Yes No   Sig: Take 1,250 mg by mouth   ciclopirox (LOPROX) 0 77 % cream   Yes No   gabapentin (NEURONTIN) 300 mg capsule 2/8/2022 at Unknown time  No Yes   Sig: Take 2 capsules (600 mg total) by mouth 3 (three) times a day   gatifloxacin (ZYMAXID) 0 5 %   No No   Sig: Administer 1 drop into the left eye 2 (two) times a day   ketorolac (ACULAR) 0 5 % ophthalmic solution Past Week at Unknown time  No Yes   Sig: Administer 1 drop into the left eye 4 (four) times a day   levothyroxine 50 mcg tablet 2/8/2022 at Unknown time  No Yes   Sig: Take 1 tablet (50 mcg total) by mouth daily   midodrine (PROAMATINE) 10 MG tablet 2/8/2022 at Unknown time  No Yes   Sig: Take 1 tablet (10 mg total) by mouth 3 (three) times a day   pantoprazole (PROTONIX) 40 mg tablet 2/8/2022 at Unknown time  No Yes   Sig: Take 1 tablet (40 mg total) by mouth daily   predniSONE 20 mg tablet 2/8/2022 at Unknown time  No Yes   Sig: Take 1 tablet (20 mg total) by mouth 2 (two) times a day for 10 days   traZODone (DESYREL) 50 mg tablet 2/7/2022 at Unknown time  No Yes   Sig: TAKE 1 TABLET BY MOUTH DAILY AT BEDTIME      Facility-Administered Medications: None       Past Medical History:   Diagnosis Date    Abdominal adhesions     Anesthesia complication difficult to wake up    Anxiety     Depression     Depression     Disease of thyroid gland     Fibromyalgia     Fractures 2006    GERD (gastroesophageal reflux disease)     History of cholecystectomy 9/7/2019    Lazy eye     resolved: 3/27/17    Medical clearance for psychiatric admission 7/13/2021    Melanoma ([address] ) 2010    Osteoarthritis 7/2018    Osteopenia     with joint pain-elevated DEV    Psychiatric disorder     Stomach disorder 1995    Tinnitus     Wears glasses     for reading       Past Surgical History:   Procedure Laterality Date    ABDOMINAL SURGERY      lysis of adhesions x 2    APPENDECTOMY      CERVICAL FUSION      May 5,2021 and Jan 01,2020    CHOLECYSTECTOMY      open    DILATION AND CURETTAGE OF UTERUS      FOOT SURGERY  5/2017    NECK SURGERY  1/2019 5/2021    NEUROMA EXCISION Right 5/26/2017    Procedure: EXCISION MASS / FIBROMA FOOT;  Surgeon: Salvatore Chawla DPM;  Location: [address] [place] [address];  Service:    Anayeli Alan PLANA VITRECTOMY W/ REPAIR OF MACULAR HOLE  12/23/2020    CT XCAPSL CTRC RMVL INSJ IO LENS PROSTH W/O ECP Left 12/6/2021    Procedure: EXTRACTION EXTRACAPSULAR CATARACT PHACO INTRAOCULAR LENS (IOL);   Surgeon: Josue Cano MD;  Location[facility] MAIN OR;  Service: Ophthalmology    RIGHT OOPHORECTOMY      WISDOM TOOTH EXTRACTION      x4       Family History   Problem Relation Age of Onset    Heart disease Mother     Stroke Mother 58    COPD Mother     Arthritis Mother     Hypertension Father     Kidney disease Brother         kidney transplant    Multiple sclerosis Sister     No Known Problems Maternal Aunt     No Known Problems Maternal Uncle     No Known Problems Paternal Aunt     No Known Problems Paternal Uncle     No Known Problems Maternal Grandmother     No Known Problems Maternal Grandfather     No Known Problems Paternal Grandmother     No Known Problems Paternal Grandfather     Dislocations Sister     Neurological problems Sister    Mavis Cousin Scoliosis Sister     ADD / ADHD Neg Hx     Anesthesia problems Neg Hx     Cancer Neg Hx     Clotting disorder Neg Hx     Collagen disease Neg Hx     Diabetes Neg Hx     Dislocations Neg Hx     Learning disabilities Neg Hx     Neurological problems Neg Hx     Osteoporosis Neg Hx     Rheumatologic disease Neg Hx     Scoliosis Neg Hx     Vascular Disease Neg Hx      I have reviewed and agree with the history as documented  E-Cigarette/Vaping    E-Cigarette Use Never User      E-Cigarette/Vaping Substances    Nicotine No     THC No     CBD No     Flavoring No     Other No     Unknown No      Social History     Tobacco Use    Smoking status: Never Smoker    Smokeless tobacco: Never Used   Vaping Use    Vaping Use: Never used   Substance Use Topics    Alcohol use: No    Drug use: No       Review of Systems   Constitutional: Negative for chills, fatigue and fever  HENT: Negative for sore throat  Eyes: Negative for visual disturbance  Respiratory: Negative for cough, chest tightness and shortness of breath  Cardiovascular: Positive for chest pain and palpitations  Gastrointestinal: Negative for abdominal distention, abdominal pain, constipation, diarrhea, nausea and vomiting  Endocrine: Negative for polydipsia and polyuria  Genitourinary: Negative for dysuria and hematuria  Musculoskeletal: Positive for neck pain and neck stiffness  Negative for arthralgias and back pain  Skin: Negative for color change and rash  Neurological: Positive for numbness  Negative for dizziness and headaches  Psychiatric/Behavioral: Negative for confusion  All other systems reviewed and are negative  Physical Exam  Physical Exam  Vitals reviewed  Constitutional:       General: She is not in acute distress  Appearance: She is well-developed  She is not diaphoretic  Comments: Anxious appearing but non-distressed    HENT:      Head: Normocephalic and atraumatic        Comments: Right sided neck tenderness along SCM and posteriorly along trapezius with associated spasm, no overlying skin changes      Right Ear: External ear normal       Left Ear: External ear normal       Nose: Nose normal       Mouth/Throat:      Pharynx: No oropharyngeal exudate  Eyes:      Pupils: Pupils are equal, round, and reactive to light  Cardiovascular:      Rate and Rhythm: Normal rate and regular rhythm  Heart sounds: Normal heart sounds  No murmur heard  No friction rub  No gallop  Comments: Initially sinus tachycardia rate of 105, improvement to NSR rate in the 90s on reassessment  No M/R/G   Pulmonary:      Effort: Pulmonary effort is normal  No respiratory distress  Breath sounds: Normal breath sounds  No wheezing  Chest:      Chest wall: No tenderness  Abdominal:      General: Bowel sounds are normal  There is no distension  Palpations: Abdomen is soft  There is no mass  Tenderness: There is no abdominal tenderness  There is no guarding  Musculoskeletal:         General: No deformity  Normal range of motion  Cervical back: Normal range of motion  Lymphadenopathy:      Cervical: No cervical adenopathy  Skin:     General: Skin is warm and dry  Capillary Refill: Capillary refill takes less than 2 seconds  Neurological:      Mental Status: She is alert and oriented to person, place, and time  Comments: AAOx4  Full strength and sensation bilaterally in upper and lower extremities            Vital Signs  ED Triage Vitals [02/08/22 1358]   Temperature Pulse Respirations Blood Pressure SpO2   97 9 °F (36 6 °C) (!) 110 20 (!) 172/94 97 %      Temp Source Heart Rate Source Patient Position - Orthostatic VS BP Location FiO2 (%)   Tympanic Monitor Lying Right arm --      Pain Score       --           Vitals:    02/08/22 1358 02/08/22 1415 02/08/22 1441 02/08/22 1444   BP: (!) 172/94 (!) 186/88 167/92    Pulse: (!) 110 104  92   Patient Position - Orthostatic VS: Lying Lying           Visual Acuity      ED Medications  Medications   iohexol (OMNIPAQUE) 350 MG/ML injection (SINGLE-DOSE) 100 mL (100 mL Intravenous Given 2/8/22 1500)       Diagnostic Studies  Results Reviewed     Procedure Component Value Units Date/Time    HS Troponin 0hr (reflex protocol) [[id number]]  (Normal) Collected: 02/08/22 1429    Lab Status: Final result Specimen: Blood from Arm, Right Updated: 02/08/22 1459     hs TnI 0hr 4 ng/L     Comprehensive metabolic panel [[id number]]  (Abnormal) Collected: 02/08/22 1429    Lab Status: Final result Specimen: Blood from Arm, Right Updated: 02/08/22 1451     Sodium 136 mmol/L      Potassium 4 2 mmol/L      Chloride 103 mmol/L      CO2 26 mmol/L      ANION GAP 7 mmol/L      BUN 15 mg/dL      Creatinine 0 91 mg/dL      Glucose 191 mg/dL      Calcium 8 5 mg/dL      AST 30 U/L      ALT 49 U/L      Alkaline Phosphatase 88 U/L      Total Protein 7 3 g/dL      Albumin 3 6 g/dL      Total Bilirubin 0 35 mg/dL      eGFR 66 ml/min/1 73sq m     Narrative:      [place] guidelines for Chronic Kidney Disease (CKD):     Stage 1 with normal or high GFR (GFR > 90 mL/min/1 73 square meters)    Stage 2 Mild CKD (GFR = 60-89 mL/min/1 73 square meters)    Stage 3A Moderate CKD (GFR = 45-59 mL/min/1 73 square meters)    Stage 3B Moderate CKD (GFR = 30-44 mL/min/1 73 square meters)    Stage 4 Severe CKD (GFR = 15-29 mL/min/1 73 square meters)    Stage 5 End Stage CKD (GFR <15 mL/min/1 73 square meters)  Note: GFR calculation is accurate only with a steady state creatinine    CBC and differential [[id number]]  (Abnormal) Collected: 02/08/22 1429    Lab Status: Final result Specimen: Blood from Arm, Right Updated: 02/08/22 1435     WBC 11 75 Thousand/uL      RBC 4 76 Million/uL      Hemoglobin 12 9 g/dL      Hematocrit 41 2 %      MCV 87 fL      MCH 27 1 pg      MCHC 31 3 g/dL      RDW 14 3 %      MPV 10 1 fL      Platelets 017 Thousands/uL      nRBC 0 /100 WBCs      Neutrophils Relative 87 %      Immat GRANS % 1 %      Lymphocytes Relative 9 %      Monocytes Relative 3 %      Eosinophils Relative 0 %      Basophils Relative 0 %      Neutrophils Absolute 10 16 Thousands/µL      Immature Grans Absolute 0 13 Thousand/uL      Lymphocytes Absolute 1 07 Thousands/µL      Monocytes Absolute 0 37 Thousand/µL      Eosinophils Absolute 0 00 Thousand/µL      Basophils Absolute 0 02 Thousands/µL                  CTA dissection protocol chest/abdomen/pelvis   Final Result by Haseeb Gabriel MD (02/08 1553)   No evidence of thoracic aortic dissection   No pulmonary embolism   Moderate amount of fecal debris in the colon   No abdominal aortic aneurysm         Workstation performed: [id number]                    Procedures  Procedures         ED Course             HEART Risk Score      Most Recent Value   Heart Score Risk Calculator    History 0 Filed at: 02/08/2022 1400   ECG 0 Filed at: 02/08/2022 1400   Age 2 Filed at: 02/08/2022 1400   Risk Factors 1 Filed at: 02/08/2022 1400   Troponin 0 Filed at: 02/08/2022 1400   HEART Score 3 Filed at: 02/08/2022 1400                                      MDM  Number of Diagnoses or Management Options  Chest pain  Hand numbness  Neck pain  Diagnosis management comments: Right neck pain, chest pain with throbbing quality  Patient hypertensive and mildly tachycardic at time of initial evaluation with otherwise benign exam and non-focal neuro exam  Given chest pain, neuro symptoms, and throbbing quality, CTA dissection study performed and negative  No abnormalities on EKG and initial trop less than 5  Patient remaining anxious but otherwise well-appearing on re-examination  Discharged with verbal and written return precautions         Disposition  Final diagnoses:   Neck pain   Chest pain   Hand numbness     Time reflects when diagnosis was documented in both MDM as applicable and the Disposition within this note     Time User Action Codes Description Comment    2/8/2022  4:04 PM Celesta Bold Add [M54 2] Neck pain     2/8/2022  4:04 PM Celesta Bold Add [R07 9] Chest pain     2/8/2022  4:04 PM Celesta Bold Add [R20 0] Hand numbness       ED Disposition     ED Disposition Condition Date/Time Comment    Discharge Stable Tue Feb 8, 2022  4:04 PM Radha Fonseca discharge to home/self care  Follow-up Information     Follow up With Specialties Details Why Contact Info Additional Information    [address] [facility] Emergency Department Emergency Medicine  If symptoms worsen [address] [address]  [address] [address] Emergency Department, Formerly [facility], [address] [place] [address], [facility], MD Internal Medicine, Family Medicine   [address] [facility]  [place] [address]  [phone]             Discharge Medication List as of 2/8/2022  4:06 PM      CONTINUE these medications which have NOT CHANGED    Details   atorvastatin (LIPITOR) 20 mg tablet Take 1 tablet (20 mg total) by mouth daily, Starting Mon 9/13/2021, Normal      !! baclofen 10 mg tablet Take 1 tablet (10 mg total) by mouth as needed for muscle spasms, Starting Tue 7/20/2021, No Print      !! baclofen 20 mg tablet Take 1 tablet (20 mg total) by mouth 3 (three) times a day for 7 days, Starting Sat 2/5/2022, Until Sat 2/12/2022, Normal      !! DULoxetine (CYMBALTA) 30 mg delayed release capsule Take 1 capsule (30 mg total) by mouth daily, Starting Wed 11/17/2021, Until Tue 2/15/2022, Normal      !!  DULoxetine (CYMBALTA) 60 mg delayed release capsule Take 1 capsule (60 mg total) by mouth daily, Starting Wed 11/17/2021, Normal      gabapentin (NEURONTIN) 300 mg capsule Take 2 capsules (600 mg total) by mouth 3 (three) times a day, Starting Wed 12/1/2021, Normal      ketorolac (ACULAR) 0 5 % ophthalmic solution Administer 1 drop into the left eye 4 (four) times a day, Starting Mon 12/6/2021, No Print      levothyroxine 50 mcg tablet Take 1 tablet (50 mcg total) by mouth daily, Starting Mon 11/22/2021, Normal      midodrine (PROAMATINE) 10 MG tablet Take 1 tablet (10 mg total) by mouth 3 (three) times a day, Starting Fri 10/29/2021, Normal      pantoprazole (PROTONIX) 40 mg tablet Take 1 tablet (40 mg total) by mouth daily, Starting Tue 9/28/2021, Normal      predniSONE 20 mg tablet Take 1 tablet (20 mg total) by mouth 2 (two) times a day for 10 days, Starting Sat 2/5/2022, Until Tue 2/15/2022, Normal      QUEtiapine (SEROquel) 100 mg tablet Take 1 tablet (100 mg total) by mouth daily at bedtime, Starting Wed 11/17/2021, Until Tue 2/15/2022, Normal      traZODone (DESYREL) 50 mg tablet TAKE 1 TABLET BY MOUTH DAILY AT BEDTIME, Normal      acetaminophen-codeine (TYLENOL #3) 300-30 mg per tablet Starting Wed 11/24/2021, Historical Med      albuterol (PROVENTIL HFA,VENTOLIN HFA) 90 mcg/act inhaler Inhale 2 puffs every 4 (four) hours as needed for wheezing, Starting Mon 7/6/2020, Normal      ALPRAZolam (XANAX) 0 5 mg tablet Take 0 5 mg by mouth 2 (two) times a day as needed, Starting Sun 5/23/2021, Historical Med      betamethasone valerate (VALISONE) 0 1 % cream Starting u 10/21/2021, Historical Med      calcium carbonate (OS-WILLY) 1250 (500 Ca) MG tablet Take 1,250 mg by mouth, Historical Med      ciclopirox (LOPROX) 0 77 % cream Starting Wed 10/20/2021, Historical Med      gatifloxacin (ZYMAXID) 0 5 % Administer 1 drop into the left eye 2 (two) times a day, Starting Mon 12/6/2021, No Print      Magnesium 400 MG TABS Take by mouth daily, Historical Med      Methocarbamol (ROBAXIN PO) Take by mouth as needed, Historical Med      Sodium Fluoride 5000 Sensitive 1 1-5 % GEL Starting Tue 11/9/2021, Historical Med       !! - Potential duplicate medications found  Please discuss with provider  No discharge procedures on file      PDMP Review       Value Time User    PDMP Reviewed  Yes 9/13/2021  6:56 PM COURTNEY Elliott-FILIPE          ED Provider  Electronically Signed by           Sotero Suarez MD  02/09/22 5068

## 2022-02-10 ENCOUNTER — APPOINTMENT (OUTPATIENT)
Dept: RADIOLOGY | Facility: CLINIC | Age: 67
End: 2022-02-10
Payer: COMMERCIAL

## 2022-02-10 ENCOUNTER — OFFICE VISIT (OUTPATIENT)
Dept: FAMILY MEDICINE CLINIC | Facility: CLINIC | Age: 67
End: 2022-02-10
Payer: COMMERCIAL

## 2022-02-10 VITALS
HEIGHT: 65 IN | HEART RATE: 83 BPM | DIASTOLIC BLOOD PRESSURE: 86 MMHG | SYSTOLIC BLOOD PRESSURE: 132 MMHG | BODY MASS INDEX: 28.16 KG/M2 | WEIGHT: 169 LBS | OXYGEN SATURATION: 98 % | TEMPERATURE: 97.4 F | RESPIRATION RATE: 16 BRPM

## 2022-02-10 DIAGNOSIS — M35.3 POLYMYALGIA (HCC): ICD-10-CM

## 2022-02-10 DIAGNOSIS — J45.20 MILD INTERMITTENT ASTHMA WITHOUT COMPLICATION: ICD-10-CM

## 2022-02-10 DIAGNOSIS — F33.2 SEVERE EPISODE OF RECURRENT MAJOR DEPRESSIVE DISORDER, WITHOUT PSYCHOTIC FEATURES (HCC): ICD-10-CM

## 2022-02-10 DIAGNOSIS — M54.12 CERVICAL MYELOPATHY WITH CERVICAL RADICULOPATHY (HCC): ICD-10-CM

## 2022-02-10 DIAGNOSIS — M54.2 CERVICALGIA: Primary | ICD-10-CM

## 2022-02-10 DIAGNOSIS — G95.9 CERVICAL MYELOPATHY WITH CERVICAL RADICULOPATHY (HCC): ICD-10-CM

## 2022-02-10 DIAGNOSIS — M54.2 CERVICALGIA: ICD-10-CM

## 2022-02-10 PROCEDURE — 99213 OFFICE O/P EST LOW 20 MIN: CPT | Performed by: INTERNAL MEDICINE

## 2022-02-10 PROCEDURE — 72050 X-RAY EXAM NECK SPINE 4/5VWS: CPT

## 2022-02-10 PROCEDURE — 1160F RVW MEDS BY RX/DR IN RCRD: CPT | Performed by: INTERNAL MEDICINE

## 2022-02-10 NOTE — PROGRESS NOTES
Assessment/Plan:    1  Cervicalgia  Comments: Will check XR and if unchanged she will start PT  Stop prednisone due to side effects and the fact that it is not helping her  Continue baclofen and add moist heat at least QID, follow up with neurosurgeon as ordered  Orders:  -     XR spine cervical complete 4 or 5 vw non injury; Future; Expected date: 02/10/2022  -     Ambulatory Referral to Physical Therapy; Future    2  Cervical myelopathy with cervical radiculopathy (HCC)    3  [facility]ym[facility] ([facility] )    4  Severe episode of recurrent major depressive disorder, without psychotic features ([facility] )    5  Mild intermittent asthma without complication            There are no Patient Instructions on file for this visit  Return if symptoms worsen or fail to improve  Subjective:      Patient ID: Radha Fonseca is a 73 y o  female  Chief Complaint   Patient presents with   Rachel Dileep     In Fingers mz cma   [address] [facility], [address] In Head     Was in E/D    Neck Pain     Left Side     Fatigue       She has been having pain in her neck, can be like a "pulsing", can feel a knot in the back of her neck  Will have intermittent numbness and tingling in fingertips  Can feel a   "rushing" in her head with turning her head  ER gave her prednisone and baclofen; she is not sure if this is helping her  She thinks the prednisone may be making her feel "weird" and is not really helping her symptoms  She does have an appointment with her neurosurgeon in 2 weeks  The following portions of the patient's history were reviewed and updated as appropriate: allergies, current medications, past family history, past medical history, past social history, past surgical history and problem list     Review of Systems   Constitutional: Negative  Respiratory: Negative  Cardiovascular: Negative  Musculoskeletal: Positive for neck pain and neck stiffness  Neurological: Positive for weakness and numbness   Negative for dizziness and headaches  Current Outpatient Medications   Medication Sig Dispense Refill    albuterol (PROVENTIL HFA,VENTOLIN HFA) 90 mcg/act inhaler Inhale 2 puffs every 4 (four) hours as needed for wheezing 1 Inhaler 0    ALPRAZolam (XANAX) 0 5 mg tablet Take 0 5 mg by mouth 2 (two) times a day as needed        atorvastatin (LIPITOR) 20 mg tablet Take 1 tablet (20 mg total) by mouth daily 90 tablet 3    baclofen 20 mg tablet Take 1 tablet (20 mg total) by mouth 3 (three) times a day for 7 days 20 tablet 0    betamethasone valerate (VALISONE) 0 1 % cream       calcium carbonate (OS-WILLY) 1250 (500 Ca) MG tablet Take 1,250 mg by mouth      ciclopirox (LOPROX) 0 77 % cream       DULoxetine (CYMBALTA) 30 mg delayed release capsule Take 1 capsule (30 mg total) by mouth daily 90 capsule 0    DULoxetine (CYMBALTA) 60 mg delayed release capsule Take 1 capsule (60 mg total) by mouth daily 90 capsule 0    gabapentin (NEURONTIN) 300 mg capsule Take 2 capsules (600 mg total) by mouth 3 (three) times a day 180 capsule 5    gatifloxacin (ZYMAXID) 0 5 % Administer 1 drop into the left eye 2 (two) times a day  0    ketorolac (ACULAR) 0 5 % ophthalmic solution Administer 1 drop into the left eye 4 (four) times a day 5 mL 0    levothyroxine 50 mcg tablet Take 1 tablet (50 mcg total) by mouth daily 90 tablet 0    Magnesium 400 MG TABS Take by mouth daily      midodrine (PROAMATINE) 10 MG tablet Take 1 tablet (10 mg total) by mouth 3 (three) times a day 90 tablet 2    pantoprazole (PROTONIX) 40 mg tablet Take 1 tablet (40 mg total) by mouth daily 90 tablet 1    QUEtiapine (SEROquel) 100 mg tablet Take 1 tablet (100 mg total) by mouth daily at bedtime 90 tablet 0    Sodium Fluoride 5000 Sensitive 1 1-5 % GEL       traZODone (DESYREL) 50 mg tablet TAKE 1 TABLET BY MOUTH DAILY AT BEDTIME 90 tablet 0     No current facility-administered medications for this visit         Objective:    /86   Pulse 83   Temp (!) 97 4 °F (36 3 °C)   Resp 16   Ht 5' 5" (1 651 m)   Wt 76 7 kg (169 lb)   LMP  (LMP Unknown)   SpO2 98%   BMI 28 12 kg/m²        Physical Exam  Constitutional:       Appearance: She is well-developed  Eyes:      Conjunctiva/sclera: Conjunctivae normal    Neck:      Thyroid: No thyromegaly  Vascular: No JVD  Cardiovascular:      Rate and Rhythm: Normal rate and regular rhythm  Heart sounds: Normal heart sounds  No murmur heard  No friction rub  No gallop  Pulmonary:      Effort: Pulmonary effort is normal       Breath sounds: Normal breath sounds  No wheezing or rales  Musculoskeletal:      Cervical back: Neck supple                  Rocael Guzman MD

## 2022-02-22 ENCOUNTER — SOCIAL WORK (OUTPATIENT)
Dept: BEHAVIORAL/MENTAL HEALTH CLINIC | Facility: CLINIC | Age: 67
End: 2022-02-22
Payer: COMMERCIAL

## 2022-02-22 DIAGNOSIS — F41.1 GENERALIZED ANXIETY DISORDER: ICD-10-CM

## 2022-02-22 DIAGNOSIS — F33.41 MAJOR DEPRESSIVE DISORDER, RECURRENT, IN PARTIAL REMISSION (HCC): Primary | ICD-10-CM

## 2022-02-22 PROCEDURE — 90834 PSYTX W PT 45 MINUTES: CPT | Performed by: SOCIAL WORKER

## 2022-02-22 NOTE — PSYCH
This note was not shared with the patient due to this is a psychotherapy note    Psychotherapy Provided: Individual Psychotherapy 60 minutes     Length of time in session: 60 minutes, follow up in 2 week    Encounter Diagnosis     ICD-10-CM    1  Major depressive disorder, recurrent, in partial remission (HCC)  F33 41    2  Generalized anxiety disorder  F41 1        Goals addressed in session: Goal 1     Pain:      none    0    Current suicide risk : Low     DATA: Met with Che Pratt for scheduled individual session  She reported feeling well overall and denied significant shifts in mood  She continues to experience some minor neck pain but not nearly as bad in previous weeks wherein she went to the ER  She reported following up with all recommended medical appointments and is staying active with current needs (will need eye surgery; will discuss physical therapy referral with providers including [facility] therapy)  We discussed current stressors including sources of emotional and physical exhaustion, and writer helped break down efforts to "let things go with love" before reaching a point of burnout and exhaustion  We related this to childhood experiences wherein she was taught not to protect herself  ASSESSMENT: Che Pratt presents with a improved mood  Her affect is normal range and intensity, appropriate  Che Pratt exhibits good therapeutic rapport with this clinician  Che Pratt continues to exhibit willingness to work on treatment goals and objectives  Che Pratt presents with a minimal risk of suicide, minimal risk of self-harm, and minimal risk of harm to others  PLAN: Che Pratt will return in 2 weeks for the next scheduled session  Between sessions, Che Pratt will practice positive self care skills and will report back during the next session re: successes and barriers   At the next session, this clinician will use supportive psychotherapy, mindfulness-based strategies and CBT techniques to address anxiety and depression, in an effort to [address] with meeting treatment goals  Behavioral Health Treatment Plan [facility]: Diagnosis and Treatment Plan explained to Felicitas Madrigal relates understanding diagnosis and is agreeable to Treatment Plan   Yes

## 2022-02-23 DIAGNOSIS — F33.41 MAJOR DEPRESSIVE DISORDER, RECURRENT, IN PARTIAL REMISSION (HCC): ICD-10-CM

## 2022-02-24 RX ORDER — QUETIAPINE FUMARATE 100 MG/1
100 TABLET, FILM COATED ORAL
Qty: 90 TABLET | Refills: 0 | Status: SHIPPED | OUTPATIENT
Start: 2022-02-24 | End: 2022-05-23 | Stop reason: SDUPTHER

## 2022-02-24 RX ORDER — DULOXETIN HYDROCHLORIDE 30 MG/1
30 CAPSULE, DELAYED RELEASE ORAL DAILY
Qty: 90 CAPSULE | Refills: 0 | Status: SHIPPED | OUTPATIENT
Start: 2022-02-24 | End: 2022-05-23 | Stop reason: SDUPTHER

## 2022-02-24 RX ORDER — DULOXETIN HYDROCHLORIDE 60 MG/1
60 CAPSULE, DELAYED RELEASE ORAL DAILY
Qty: 90 CAPSULE | Refills: 0 | Status: SHIPPED | OUTPATIENT
Start: 2022-02-24 | End: 2022-05-23 | Stop reason: SDUPTHER

## 2022-02-25 ENCOUNTER — OFFICE VISIT (OUTPATIENT)
Dept: FAMILY MEDICINE CLINIC | Facility: CLINIC | Age: 67
End: 2022-02-25
Payer: COMMERCIAL

## 2022-02-25 VITALS
SYSTOLIC BLOOD PRESSURE: 116 MMHG | OXYGEN SATURATION: 93 % | RESPIRATION RATE: 18 BRPM | HEART RATE: 90 BPM | BODY MASS INDEX: 27.99 KG/M2 | WEIGHT: 168 LBS | TEMPERATURE: 99.6 F | DIASTOLIC BLOOD PRESSURE: 76 MMHG | HEIGHT: 65 IN

## 2022-02-25 DIAGNOSIS — F33.2 SEVERE EPISODE OF RECURRENT MAJOR DEPRESSIVE DISORDER, WITHOUT PSYCHOTIC FEATURES (HCC): ICD-10-CM

## 2022-02-25 DIAGNOSIS — J45.20 MILD INTERMITTENT ASTHMA WITHOUT COMPLICATION: ICD-10-CM

## 2022-02-25 DIAGNOSIS — I95.1 ORTHOSTATIC HYPOTENSION: ICD-10-CM

## 2022-02-25 DIAGNOSIS — E03.8 OTHER SPECIFIED HYPOTHYROIDISM: ICD-10-CM

## 2022-02-25 DIAGNOSIS — H35.371 MACULAR PUCKER, RIGHT EYE: Primary | ICD-10-CM

## 2022-02-25 DIAGNOSIS — Z98.890 HISTORY OF LOOP RECORDER: ICD-10-CM

## 2022-02-25 PROCEDURE — 1160F RVW MEDS BY RX/DR IN RCRD: CPT | Performed by: NURSE PRACTITIONER

## 2022-02-25 PROCEDURE — 1036F TOBACCO NON-USER: CPT | Performed by: NURSE PRACTITIONER

## 2022-02-25 PROCEDURE — 99214 OFFICE O/P EST MOD 30 MIN: CPT | Performed by: NURSE PRACTITIONER

## 2022-02-25 PROCEDURE — 3008F BODY MASS INDEX DOCD: CPT | Performed by: NURSE PRACTITIONER

## 2022-02-25 RX ORDER — METOPROLOL SUCCINATE 25 MG/1
25 TABLET, EXTENDED RELEASE ORAL DAILY
COMMUNITY
Start: 2022-02-25

## 2022-02-25 RX ORDER — LEVOTHYROXINE SODIUM 0.05 MG/1
50 TABLET ORAL DAILY
Qty: 90 TABLET | Refills: 0 | Status: SHIPPED | OUTPATIENT
Start: 2022-02-25 | End: 2022-04-01

## 2022-02-25 NOTE — PROGRESS NOTES
FAMILY PRACTICE PRE-OPERATIVE EVALUATION  [facility]    NAME: Peter Stokes  AGE: 73 y o  SEX: female  : 1955     DATE: 2022    Family Practice Pre-Operative Evaluation      Chief Complaint: Pre-operative Evaluation     Surgery: Vitrectomy  Right eye  Anticipated Date of Surgery: 2022  Surgeon: Dr Delmi Spencer     History of Present Illness:     HPI  Patient is here for per op clearance before her upcoming right surgery  Denies any chest pain, sob, headache and dizziness  Today went for pre op clearance from cardiologist and received clearance and had EKG done  No blood work ordered by surgeon  Anesthesia:  IV sedation and Mac anesthesia  Bleeding Risk: no recent abnormal bleeding  Current Anti-platelet/anticoagulation medication: none    Assessment of Cardiac Risk:  · Denies unstable or severe angina or MI in the last 6 weeks or history of stent placement in the last year   · Denies decompensated heart failure (e g  New onset heart failure, NYHA functional class IV heart failure, or worsening existing heart failure)  · Denies significant arrhythmias such as high grade AV block, symptomatic ventricular arrhythmia, newly recognized ventricular tachycardia, supraventricular tachycardia with resting heart rate >100, or symptomatic bradycardia  · Denies severe heart valve disease including aortic stenosis or symptomatic mitral stenosis     Exercise Capacity:  · Able to walk 4 blocks without symptoms?: Yes  · Able to walk 2 flights without symptoms?: Yes    Prior Anesthesia Reactions: No     Personal history of venous thromboembolic disease? No    History of steroid use for >2 weeks within last year? No         Review of Systems:     Review of Systems   Constitutional: Negative  HENT: Negative  Eyes: Positive for visual disturbance  Respiratory: Negative  Cardiovascular: Negative  Gastrointestinal: Negative  Endocrine: Negative      Genitourinary: Negative  Skin: Negative  Allergic/Immunologic: Negative  Neurological: Negative  Hematological: Negative  Current Problem List:     Patient Active Problem List   Diagnosis    Alcohol dependence in remission ([facility] )    Allergic rhinitis    Arthropathy of multiple sites    Severe episode of recurrent major depressive disorder, without psychotic features ([facility] )    Irritable bowel syndrome    Raynaud's syndrome without gangrene    Osteopenia    Radiculopathy of lumbar region    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[facility] )    Focal neurological deficit    Gastroesophageal reflux disease with esophagitis without hemorrhage    Cervical myelopathy with cervical radiculopathy (HCC)    History of recent intraspinal surgery    Orthostatic hypotension    History of migraine headaches    Mixed dyslipidemia    Lesion of lachelle    Right sided temporal headache    Fibromyalgia    Generalized anxiety disorder    Herniated cervical disc    [facility]anoma ([facility] )    Spinal stenosis    BMI 27 0-27 9,adult    Psychogenic weakness    History of cholecystectomy    Ambulatory dysfunction    Adult hypothyroidism    Dizziness and giddiness    Migraine without aura and without status migrainosus, not intractable    Syncope    History of vertigo    Epigastric abdominal pain    Leg weakness, bilateral    Depression    Mild asthma    Neuropathy    PTSD (post-traumatic stress disorder)    Osteoarthritis of knee    Major depressive disorder, recurrent, in partial remission ([facility] )    Gambling disorder, moderate    Primary insomnia    Insomnia related to another mental disorder    Polymyalgia ([facility] )       Allergies:      Allergies   Allergen Reactions    Demerol [Meperidine] Lightheadedness    Sulfa Antibiotics Hives       Current Medications:       Current Outpatient Medications:     albuterol (PROVENTIL HFA,VENTOLIN HFA) 90 mcg/act inhaler, Inhale 2 puffs every 4 (four) hours as needed for wheezing, Disp: 1 Inhaler, Rfl: 0    ALPRAZolam (XANAX) 0 5 mg tablet, Take 0 5 mg by mouth 2 (two) times a day as needed  , Disp: , Rfl:     atorvastatin (LIPITOR) 20 mg tablet, Take 1 tablet (20 mg total) by mouth daily, Disp: 90 tablet, Rfl: 3    betamethasone valerate (VALISONE) 0 1 % cream, , Disp: , Rfl:     calcium carbonate (OS-WILLY) 1250 (500 Ca) MG tablet, Take 1,250 mg by mouth, Disp: , Rfl:     ciclopirox (LOPROX) 0 77 % cream, , Disp: , Rfl:     DULoxetine (CYMBALTA) 30 mg delayed release capsule, Take 1 capsule (30 mg total) by mouth daily, Disp: 90 capsule, Rfl: 0    DULoxetine (CYMBALTA) 60 mg delayed release capsule, Take 1 capsule (60 mg total) by mouth daily, Disp: 90 capsule, Rfl: 0    gabapentin (NEURONTIN) 300 mg capsule, Take 2 capsules (600 mg total) by mouth 3 (three) times a day, Disp: 180 capsule, Rfl: 5    ketorolac (ACULAR) 0 5 % ophthalmic solution, Administer 1 drop into the left eye 4 (four) times a day, Disp: 5 mL, Rfl: 0    levothyroxine 50 mcg tablet, Take 1 tablet (50 mcg total) by mouth daily, Disp: 90 tablet, Rfl: 0    Magnesium 400 MG TABS, Take by mouth daily, Disp: , Rfl:     metoprolol succinate (TOPROL-XL) 25 mg 24 hr tablet, , Disp: , Rfl:     midodrine (PROAMATINE) 10 MG tablet, Take 1 tablet (10 mg total) by mouth 3 (three) times a day, Disp: 90 tablet, Rfl: 2    pantoprazole (PROTONIX) 40 mg tablet, Take 1 tablet (40 mg total) by mouth daily, Disp: 90 tablet, Rfl: 1    QUEtiapine (SEROquel) 100 mg tablet, Take 1 tablet (100 mg total) by mouth daily at bedtime, Disp: 90 tablet, Rfl: 0    Sodium Fluoride 5000 Sensitive 1 1-5 % GEL, , Disp: , Rfl:     traZODone (DESYREL) 50 mg tablet, TAKE 1 TABLET BY MOUTH DAILY AT BEDTIME, Disp: 90 tablet, Rfl: 0    Past Medical History:       Past Medical History:   Diagnosis Date    Abdominal adhesions     Anesthesia complication     difficult to wake up    Anxiety     Depression     Depression     Disease of thyroid gland     Fibromyalgia     Fractures 2006    GERD (gastroesophageal reflux disease)     History of cholecystectomy 9/7/2019    Lazy eye     resolved: 3/27/17    Medical clearance for psychiatric admission 7/13/2021    Melanoma ([address] ) 2010    Osteoarthritis 7/2018    Osteopenia     with joint pain-elevated DEV    Psychiatric disorder     Stomach disorder 1995    Tinnitus     Wears glasses     for reading        Past Surgical History:   Procedure Laterality Date    ABDOMINAL SURGERY      lysis of adhesions x 2    APPENDECTOMY      CERVICAL FUSION      May 5,2021 and Jan 01,2020    CHOLECYSTECTOMY      open    DILATION AND CURETTAGE OF UTERUS      FOOT SURGERY  5/2017    NECK SURGERY  1/2019 5/2021    NEUROMA EXCISION Right 5/26/2017    Procedure: EXCISION MASS / FIBROMA FOOT;  Surgeon: Christina Crabtree DPM;  Location: [address] [place] [address];  Service:    Kacey Veloz PLANA VITRECTOMY W/ REPAIR OF MACULAR HOLE  12/23/2020    MS XCAPSL CTRC RMVL INSJ IO LENS PROSTH W/O ECP Left 12/6/2021    Procedure: EXTRACTION EXTRACAPSULAR CATARACT PHACO INTRAOCULAR LENS (IOL);   Surgeon: Tigist Brito MD;  Location: [facility] MAIN OR;  Service: Ophthalmology    RIGHT OOPHORECTOMY      WISDOM TOOTH EXTRACTION      x4        Family History   Problem Relation Age of Onset    Heart disease Mother     Stroke Mother 58    COPD Mother     Arthritis Mother     Hypertension Father     Kidney disease Brother         kidney transplant    Multiple sclerosis Sister     No Known Problems Maternal Aunt     No Known Problems Maternal Uncle     No Known Problems Paternal Aunt     No Known Problems Paternal Uncle     No Known Problems Maternal Grandmother     No Known Problems Maternal Grandfather     No Known Problems Paternal Grandmother     No Known Problems Paternal Grandfather     Dislocations Sister     Neurological problems Sister     Scoliosis Sister     ADD / ADHD Neg Hx     Anesthesia problems Neg Hx     Cancer Neg Hx     Clotting disorder Neg Hx     Collagen disease Neg Hx     Diabetes Neg Hx     Dislocations Neg Hx     Learning disabilities Neg Hx     Neurological problems Neg Hx     Osteoporosis Neg Hx     Rheumatologic disease Neg Hx     Scoliosis Neg Hx     Vascular Disease Neg Hx         Social History     Socioeconomic History    Marital status: /Civil Union     Spouse name: Not on file    Number of children: Not on file    Years of education: Not on file    Highest education level: Not on file   Occupational History    Not on file   Tobacco Use    Smoking status: Never Smoker    Smokeless tobacco: Never Used   Vaping Use    Vaping Use: Never used   Substance and Sexual Activity    Alcohol use: No    Drug use: No    Sexual activity: Not Currently   Other Topics Concern    Not on file   Social History Narrative    Not on file     Social Determinants of Health     Financial Resource Strain: Not on file   Food Insecurity: Not on file   Transportation Needs: Not on file   Physical Activity: Not on file   Stress: Not on file   Social Connections: Not on file   Intimate Partner Violence: Not on file   Housing Stability: Not on file        Physical Exam:     /76   Pulse 90   Temp 99 6 °F (37 6 °C)   Resp 18   Ht 5' 5" (1 651 m)   Wt 76 2 kg (168 lb)   LMP  (LMP Unknown)   SpO2 93%   BMI 27 96 kg/m²     Physical Exam     Data:     Pre-operative work-up        Recent Results (from the past 672 hour(s))   Comprehensive metabolic panel    Collection Time: 01/31/22  8:06 AM   Result Value Ref Range    Glucose, Random 105 (H) 65 - 99 mg/dL    BUN 18 8 - 27 mg/dL    Creatinine 0 99 0 57 - 1 00 mg/dL    eGFR Non African American 60 >59 mL/min/1 73    eGFR  69 >59 mL/min/1 73    SL AMB BUN/CREATININE RATIO 18 12 - 28    Sodium 142 134 - 144 mmol/L    Potassium 4 8 3 5 - 5 2 mmol/L    Chloride 106 96 - 106 mmol/L    CO2 22 20 - 29 mmol/L    CALCIUM 9 5 8 7 - 10 3 mg/dL    Protein, Total 6 5 6 0 - 8 5 g/dL Albumin 4 1 3 8 - 4 8 g/dL    Globulin, Total 2 4 1 5 - 4 5 g/dL    Albumin/Globulin Ratio 1 7 1 2 - 2 2    TOTAL BILIRUBIN 0 3 0 0 - 1 2 mg/dL    Alk Phos Isoenzymes 84 44 - 121 IU/L    AST 20 0 - 40 IU/L    ALT 22 0 - 32 IU/L   Lipid panel    Collection Time: 01/31/22  8:06 AM   Result Value Ref Range    Cholesterol, Total 162 100 - 199 mg/dL    Triglycerides 101 0 - 149 mg/dL    HDL 53 >39 mg/dL    VLDL Cholesterol Calculated 18 5 - 40 mg/dL    LDL Calculated 91 0 - 99 mg/dL    T   Chol/HDL Ratio 3 1 0 0 - 4 4 ratio   TSH, 3rd generation with Free T4 reflex    Collection Time: 01/31/22  8:06 AM   Result Value Ref Range    TSH 2 800 0 450 - 4 500 uIU/mL   Cardiovascular Report    Collection Time: 01/31/22  8:06 AM   Result Value Ref Range    Interpretation Note    ECG 12 lead    Collection Time: 02/08/22  2:01 PM   Result Value Ref Range    Ventricular Rate 109 BPM    Atrial Rate 109 BPM    MI Interval 156 ms    QRSD Interval 82 ms    QT Interval 334 ms    QTC Interval 449 ms    P Axis 66 degrees    QRS Axis -27 degrees    T Wave Axis 59 degrees   CBC and differential    Collection Time: 02/08/22  2:29 PM   Result Value Ref Range    WBC 11 75 (H) 4 31 - 10 16 Thousand/uL    RBC 4 76 3 81 - 5 12 Million/uL    Hemoglobin 12 9 11 5 - 15 4 g/dL    Hematocrit 41 2 34 8 - 46 1 %    MCV 87 82 - 98 fL    MCH 27 1 26 8 - 34 3 pg    MCHC 31 3 (L) 31 4 - 37 4 g/dL    RDW 14 3 11 6 - 15 1 %    MPV 10 1 8 9 - 12 7 fL    Platelets 720 679 - 111 Thousands/uL    nRBC 0 /100 WBCs    Neutrophils Relative 87 (H) 43 - 75 %    Immat GRANS % 1 0 - 2 %    Lymphocytes Relative 9 (L) 14 - 44 %    Monocytes Relative 3 (L) 4 - 12 %    Eosinophils Relative 0 0 - 6 %    Basophils Relative 0 0 - 1 %    Neutrophils Absolute 10 16 (H) 1 85 - 7 62 Thousands/µL    Immature Grans Absolute 0 13 0 00 - 0 20 Thousand/uL    Lymphocytes Absolute 1 07 0 60 - 4 47 Thousands/µL    Monocytes Absolute 0 37 0 17 - 1 22 Thousand/µL    Eosinophils Absolute 0  00 0 00 - 0 61 Thousand/µL    Basophils Absolute 0 02 0 00 - 0 10 Thousands/µL   Comprehensive metabolic panel    Collection Time: 02/08/22  2:29 PM   Result Value Ref Range    Sodium 136 136 - 145 mmol/L    Potassium 4 2 3 5 - 5 3 mmol/L    Chloride 103 100 - 108 mmol/L    CO2 26 21 - 32 mmol/L    ANION GAP 7 4 - 13 mmol/L    BUN 15 5 - 25 mg/dL    Creatinine 0 91 0 60 - 1 30 mg/dL    Glucose 191 (H) 65 - 140 mg/dL    Calcium 8 5 8 3 - 10 1 mg/dL    AST 30 5 - 45 U/L    ALT 49 12 - 78 U/L    Alkaline Phosphatase 88 46 - 116 U/L    Total Protein 7 3 6 4 - 8 2 g/dL    Albumin 3 6 3 5 - 5 0 g/dL    Total Bilirubin 0 35 0 20 - 1 00 mg/dL    eGFR 66 ml/min/1 73sq m   HS Troponin 0hr (reflex protocol)    Collection Time: 02/08/22  2:29 PM   Result Value Ref Range    hs TnI 0hr 4 "Refer to ACS Flowchart"- see link ng/L           Assessment & Recommendations:     Problem List Items Addressed This Visit        Respiratory    Mild asthma       Cardiovascular and Mediastinum    Orthostatic hypotension       Other    Severe episode of recurrent major depressive disorder, without psychotic features ([facility] [address] )      Other Visit Diagnoses     Macular pucker, right eye    -  Primary    History of loop recorder        Other specified hypothyroidism        Relevant Medications    metoprolol succinate (TOPROL-XL) 25 mg 24 hr tablet    levothyroxine 50 mcg tablet          Pre-Op Evaluation Assessment  73 y o  female with planned surgery: Vitrectomy on right eye  Known risk factors for perioperative complications: None  Current medications which may produce withdrawal symptoms if withheld perioperatively: none    Pre-Op Evaluation Plan  1  Further preoperative workup as follows:   - None; no further preoperative work-up is required    2   Medication Management/Recommendations:   - Patient has been instructed to avoid herbs or non-directed vitamins the week prior to surgery to ensure no drug interactions with perioperative surgical and anesthetic medications  - Patient has been instructed to avoid aspirin containing medications or non-steroidal anti-inflammatory drugs for the week preceding surgery  Will follow instructions on pre op medications as advised by surgeon  3  Prophylaxis for cardiac events with perioperative beta-blockers: not indicated  4  Patient requires further consultation with: None    OSMAN Risk:  GFR:   eGFR   Date Value Ref Range Status   02/08/2022 66 ml/min/1 73sq m Final             Clearance  Patient is CLEARED for surgery without any additional cardiac testing       Deepthi Linares [address]  RODOLFO [address] [place] [id number]  Phone#  [phone]  Fax#  [phone]

## 2022-02-25 NOTE — PATIENT INSTRUCTIONS
- Patient has been instructed to avoid herbs or non-directed vitamins the week prior to surgery to ensure no drug interactions with perioperative surgical and anesthetic medications  - Patient has been instructed to avoid aspirin containing medications or non-steroidal anti-inflammatory drugs for the week preceding surgery    Will follow instructions on pre op medications as advised by surgeon

## 2022-03-08 ENCOUNTER — SOCIAL WORK (OUTPATIENT)
Dept: BEHAVIORAL/MENTAL HEALTH CLINIC | Facility: CLINIC | Age: 67
End: 2022-03-08
Payer: COMMERCIAL

## 2022-03-08 DIAGNOSIS — F41.1 GENERALIZED ANXIETY DISORDER: ICD-10-CM

## 2022-03-08 DIAGNOSIS — F33.41 MAJOR DEPRESSIVE DISORDER, RECURRENT, IN PARTIAL REMISSION (HCC): Primary | ICD-10-CM

## 2022-03-08 PROCEDURE — 90834 PSYTX W PT 45 MINUTES: CPT | Performed by: SOCIAL WORKER

## 2022-03-08 NOTE — PSYCH
This note was not shared with the patient due to this is a psychotherapy note    Psychotherapy Provided: Individual Psychotherapy 45 minutes     Length of time in session: 45 minutes, follow up in 2 week    Encounter Diagnosis     ICD-10-CM    1  Major depressive disorder, recurrent, in partial remission (HCC)  F33 41    2  Generalized anxiety disorder  F41 1        Goals addressed in session: Goal 1     Pain:      none    0    Current suicide risk : Low     DATA: Met with Lesly Miller for scheduled individual session  She reported having eye surgery which she shared went well  Discussed ongoing effective communication strategies used to help express her needs and expectations from others  Writer pointed out her habit of judging herself quickly, often times referring herself as "stupid"  We talked this through and worked on replacing automatic negative thoughts with more helpful and emotionally reassuring ones  ASSESSMENT: Lesly Miller presents with a normal mood  Her affect is normal range and intensity, appropriate  Lesly Miller exhibits good therapeutic rapport with this clinician  Lesly Miller continues to exhibit willingness to work on treatment goals and objectives  Lesly Miller presents with a minimal risk of suicide, minimal risk of self-harm, and minimal risk of harm to others  PLAN: Lesly Miller will return in 2 weeks for the next scheduled session  Between sessions, Lesly Miller will apply positive coping skills and will report back during the next session re: successes and barriers  At the next session, this clinician will use supportive psychotherapy, mindfulness-based strategies and CBT techniques to address stress management, in an effort to assist Lesly Miller with meeting treatment goals  Behavioral Health Treatment Plan [facility]: Diagnosis and Treatment Plan explained to Ivette Baeza relates understanding diagnosis and is agreeable to Treatment Plan   Yes

## 2022-03-17 ENCOUNTER — EVALUATION (OUTPATIENT)
Dept: PHYSICAL THERAPY | Facility: CLINIC | Age: 67
End: 2022-03-17
Payer: COMMERCIAL

## 2022-03-17 DIAGNOSIS — M54.2 CERVICALGIA: ICD-10-CM

## 2022-03-17 PROCEDURE — 97162 PT EVAL MOD COMPLEX 30 MIN: CPT

## 2022-03-17 NOTE — PROGRESS NOTES
PT Evaluation     Today's date: 3/17/2022  Patient name: Sacha Wheatley  : 1955  MRN: [id number]  Referring provider: Catarino Boxer, MD  Dx:   Encounter Diagnosis     ICD-10-CM    1  Cervicalgia  M54 2 Ambulatory Referral to Physical Therapy    will check XR and if unchanged she will start PT  Stop prednisone due to side effects and the fact that it is not helping her  Continue baclofen and add m                  Assessment  Assessment details:   CASE SUMMARY:   Sacha Wheatley is a 73y o  year old female who presents to OPPT upon referral from primary  secondary to c/o cervical pain and mscuel pain in lower legs luis enrique and arms luis enrique  Has had blood work and sees the rheumatologist for these symptoms  Symptoms have remained undiagnosed  Sees rheumatologist 23  Cervical fusion were 2021, 2020  Did not had have PT after surgerys  Symptoms began  ~ 6 weeks ago  States incidious onset  Went to ER for Knots in cervical spine and was referred to primary also had swooshing noise in head and was attributed to stress  Cervical pain is improving but still limiting function: has difficulty turning head to right, difficulty pushing vacuum and reaching above herself  With difficulty opening lids  Current symptom location includes luis enrique aspects of cervical spine  Symptoms are : intermittent  Pain level:  Current: 5/10  At Best: 0/10   At worst: 8/10 and with ADL's 4/10  Symptoms improve with: MH and ice, sitting in her recliner with feet elevated and worsen with as noted above  Overall symptom progression at this time is improving  Previous treatment includes: none  Diagnostic testing includes: xray cervical spine: + stable, + arthritis  No restrictions in function form cervical surgery per patient  PMHx includes: See chart for full details with medications, allergies, past family history, past medical history, social history, past surgical history and problem list  All topics were reviewed  Functionally, at baseline, Jeff Cunha is independent with all basic ADL's and  advanced ADL's  Currently, Jeff Cunha is limited in the following activities: higher level cleaning, gardening   Jeff Cunha is lives with  in a 1 story with laundry in basement story home   Recreational activities include: none   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   Patient goal(s) for physical therapy is: to improve function and less pain   The following is a summary of Jeff Cunha objective status:    Impairments:   Postural dysfunction  Reduced ROM  Reduced strength  Reduced flexibility  Gait/elevation dysfunction  Reduced stability  Transfers impairments  Reduced activity tolerance including above stated functional limitations    Patient's clinical presentation is consistent with their referring diagnosis of: Cervicalgia  Comment: will check XR and if unchanged she will start PT  Stop prednisone due to side effects and the fact that it is not helping her  Continue baclofen and add m  Plan: Ambulatory Referral to Physical Therapy    Patient presents with reduced cervical ROM with main complaint is tightness or stretch with motion  Noted was weakness luis enrique UE and LE  Patient demonstrated difficulties with cramping with resistance testing  Patient will benefit from skilled PT to improve cervical motion, and general strengthening UE and LE to improve overall fitness and actvitiy tolerance  PLOC was discussed and agreed upon with patient  Patient was educated on: PLOc   Patient vocalized a good understanding of  [address] issued  Jeff Cunha would benefit from skilled physical therapy services to address their aforementioned functional limitations and progress towards prior level of function, to meet stated goals,  and independence with home exercise program   Prognosis for successful rehab outcome is fair  with consistent participation in therapy due to extension PMHx      Impairments: abnormal muscle tone, abnormal or restricted ROM, abnormal movement, activity intolerance, impaired physical strength, lacks appropriate home exercise program, pain with function, scapular dyskinesis and poor posture   Functional limitations: see subjectiveUnderstanding of Dx/Px/POC: good   Prognosis: good    Goals  STG:  + Patient will have pain level 0/10 cervical spine,   at rest (2-3 weeks)  + Patient will report a 30% improvement in symptoms cervical spine (2-3 weeks)   + Patient will be independent in basic HEP (2-3 weeks)  + Patient will demonstrate an increase in range of motion  cervical spine rotation to min LOm luis enrique  (2-3 weeks)  + Patient will tolerance 35 min there ex program without an inc in pain (2-3 weeks)     LTG:   adltg+ Patient will have pain level 2/10 cervical spine  with ADL's (4-6 weeks)  + Patient will report a 50% improvement in symptoms (4-6 weeks)   + Patient will increase FOTO score by 10 points (10 sessions)   + Patient will be independent in comprehensive HEP (4-6 weeks)  + Patient will demonstrate increase  MMT of  luis enrique UE and LE to  4/5 for maximum function  (4-6 weeks)  + Patient will demonstrate functional reaching behind back on right wnl (4-6 weeks)      Plan  Plan details: 2-3 x week, 4-6 weeks: HEP development, stretching as needed per objective findings, strengthening upper quadrant, A/AA/PROM cervical/shoulder motions, joint mobilizations prn cervical and thoracic, posture education, STM/MI as needed to reduce muscle tension per objective findings, muscle reeducation per objective findings, dynamic stabilization, PLOC discussed and agreed upon with patient        Patient would benefit from: skilled PT  Planned therapy interventions: abdominal trunk stabilization, joint mobilization, manual therapy, ADL retraining, neuromuscular re-education, body mechanics training, patient education, postural training, strengthening, stretching, therapeutic exercise, functional ROM exercises, home exercise program, graded exercise and massage  Other planned therapy interventions: stabilization  Plan of Care beginning date: 3/17/2022  Plan of Care expiration date: 4/28/2022  Treatment plan discussed with: patient        Subjective    Objective     Static Posture     Comments  Posture  Sitting: forward head, rounded shoulders   Standing: slouched posture    Cervical ROM  Active (sitting)  Protraction: mod LOm + stretch  Retraction: major LOm   Flexion: wnl  + strain/stretch   Extension: major LOM "its hard"  Rotation: Right:  Major LOM  + tight  Left mod  LOM+ tight  Side bend: Right:  Major LOM  + strain Left major LOM  + strain    Functional reaching:  Overhead: reduced by 75%   Behind head: able to achieve position but with difficulty  Behind back: limited on right by 20% left wnl + pain on right and muscle "cramping"    MMT Upper Extremity  Flexion: Right: 3+/5 Left 3+/5  Abduction(C5): Right: 3+/5    Left 3+/5    Elbow flexion (C6): Right: 4/5 Left 4/5    Elbow extension (C7): Right: 4/5  Left 4/5   Wrist flexion: Right: 3+/5     Left 3+/5   Wrist Extension: Right: 3+/5  Left 3+/5  : Right: fair   Left: fair    Digit abduction (C8): Right: 4/5 left: 4/5  After strength testing right hamstring moved to left and right thigh started tremoring  Hip flexors: luis enrique 4-/5  Knee flexion: 4/5   Knee extension: 4/5  Ankle: DF right 4-/5, left: 4/5   Ankle PF: Right: + produced cramp 4/5 , left 45      Dermatomes: grossly wnl to light touch luis enrique UE and LE   gait: no assistive device, reduced push off, reduced trunk stability with no LOM, reduced trunk rotation  Shoulder ROM: prn               Eval/ Re-eval Auth #/ Referral # Total visits Start date  Expiration date Total active units  Total manual units  PT only or  PT+OT?                                                             Date:           Total authorized units:  Active:            Manual:           Total remaining units:  Active:               Manual: Date:           Total authorized units: Active:            Manual:           Total remaining units:  Active:               Manual:                        Precautions depression, anxiety,     Specialty Daily Treatment Diary     Insurance:         Visits: 1       Manual: 3/17/22               STM/SOR        UT stretch                                Protocol:        Therapeutic Activity:        Reevaluation        Posture instruction                Therapeutic Exercise:                AROM cervical rotation        Self UT stretch        Scap retraction with tband ER        Tubing rows        scap isometrics        scaption        pec stretch         Standing hip flexion        Sit/stand to mat table         SLR        sidestepping        Step ups                        Neuro Neftali:        Stab cuff: cspine retraction                Gait Training:                 HEP:                Modalities                Ice

## 2022-03-21 ENCOUNTER — OFFICE VISIT (OUTPATIENT)
Dept: PODIATRY | Facility: CLINIC | Age: 67
End: 2022-03-21
Payer: COMMERCIAL

## 2022-03-21 VITALS
DIASTOLIC BLOOD PRESSURE: 76 MMHG | RESPIRATION RATE: 18 BRPM | BODY MASS INDEX: 27.99 KG/M2 | SYSTOLIC BLOOD PRESSURE: 116 MMHG | HEIGHT: 65 IN | HEART RATE: 90 BPM | WEIGHT: 168 LBS

## 2022-03-21 DIAGNOSIS — M20.11 VALGUS DEFORMITY OF BOTH GREAT TOES: ICD-10-CM

## 2022-03-21 DIAGNOSIS — M20.42 HAMMERTOE, BILATERAL: ICD-10-CM

## 2022-03-21 DIAGNOSIS — M21.961 ACQUIRED DEFORMITY OF BOTH FEET: ICD-10-CM

## 2022-03-21 DIAGNOSIS — R25.2 LEG CRAMPS: ICD-10-CM

## 2022-03-21 DIAGNOSIS — M20.12 VALGUS DEFORMITY OF BOTH GREAT TOES: ICD-10-CM

## 2022-03-21 DIAGNOSIS — M21.962 ACQUIRED DEFORMITY OF BOTH FEET: ICD-10-CM

## 2022-03-21 DIAGNOSIS — M20.41 HAMMERTOE, BILATERAL: ICD-10-CM

## 2022-03-21 DIAGNOSIS — G62.9 NEUROPATHY: Primary | ICD-10-CM

## 2022-03-21 PROCEDURE — 99203 OFFICE O/P NEW LOW 30 MIN: CPT | Performed by: PODIATRIST

## 2022-03-21 PROCEDURE — L3020 FOOT LONGITUD/METATARSAL SUP: HCPCS | Performed by: PODIATRIST

## 2022-03-21 RX ORDER — L-METHYLFOLATE-ALGAE-VIT B12-B6 CAP 3-90.314-2-35 MG 3-90.314-2-35 MG
1 CAP ORAL 2 TIMES DAILY
Qty: 60 CAPSULE | Refills: 2 | Status: SHIPPED | OUTPATIENT
Start: 2022-03-21 | End: 2022-07-26 | Stop reason: ALTCHOICE

## 2022-03-22 ENCOUNTER — SOCIAL WORK (OUTPATIENT)
Dept: BEHAVIORAL/MENTAL HEALTH CLINIC | Facility: CLINIC | Age: 67
End: 2022-03-22
Payer: COMMERCIAL

## 2022-03-22 ENCOUNTER — OFFICE VISIT (OUTPATIENT)
Dept: PHYSICAL THERAPY | Facility: CLINIC | Age: 67
End: 2022-03-22
Payer: COMMERCIAL

## 2022-03-22 DIAGNOSIS — F41.1 GENERALIZED ANXIETY DISORDER: ICD-10-CM

## 2022-03-22 DIAGNOSIS — M54.2 CERVICALGIA: Primary | ICD-10-CM

## 2022-03-22 DIAGNOSIS — F33.41 MAJOR DEPRESSIVE DISORDER, RECURRENT, IN PARTIAL REMISSION (HCC): Primary | ICD-10-CM

## 2022-03-22 PROCEDURE — 97112 NEUROMUSCULAR REEDUCATION: CPT

## 2022-03-22 PROCEDURE — 90834 PSYTX W PT 45 MINUTES: CPT | Performed by: SOCIAL WORKER

## 2022-03-22 PROCEDURE — 97140 MANUAL THERAPY 1/> REGIONS: CPT

## 2022-03-22 PROCEDURE — 97110 THERAPEUTIC EXERCISES: CPT

## 2022-03-22 NOTE — PSYCH
This note was not shared with the patient due to this is a psychotherapy note    Psychotherapy Provided: Individual Psychotherapy 60 minutes     Length of time in session: 60 minutes, follow up in 2 week    Encounter Diagnosis     ICD-10-CM    1  Major depressive disorder, recurrent, in partial remission (HCC)  F33 41    2  Generalized anxiety disorder  F41 1        Goals addressed in session: Goal 1 and Goal 2     Pain:      none    0    Current suicide risk : Low     DATA: Met with Boaz Hess for scheduled individual session  She reported feeling well stating that she has made an appointment to see a cosmetic dentist, has followed up physical therapy regarding her leg pain, and is planning her days/weeks intentionally so as not to overwhelm/overcommit herself  Writer praised her for her efforts and pointed out ways that she has grown since our therapy first began (understanding and feeling more comfortable setting limits, being more mindful of automatic language and how this impacts self image)  ASSESSMENT: Boaz Hess presents with a improved mood  Her affect is normal range and intensity, appropriate  Boaz Hess exhibits good therapeutic rapport with this clinician  Boaz Hess continues to exhibit willingness to work on treatment goals and objectives  Boaz Hess presents with a minimal risk of suicide, minimal risk of self-harm, and minimal risk of harm to others  PLAN: Boaz Hess will return in 2 weeks for the next scheduled session  Between sessions, Boaz Hess will practice mindfulness skills as it relates to self care and will report back during the next session re: successes and barriers  At the next session, this clinician will use supportive psychotherapy, mindfulness-based strategies and CBT techniques to address stress management, in an effort to assist Boaz Hess with meeting treatment goals       Behavioral Health Treatment Plan [facility]: Diagnosis and Treatment Plan explained to Jean-Claude Mcclure relates understanding diagnosis and is agreeable to Treatment Plan   Yes

## 2022-03-22 NOTE — PROGRESS NOTES
Daily Note     Today's date: 3/22/2022  Patient name: Florentin Jolley  : 1955  MRN: [id number]  Referring provider: Renny Mullins MD  Dx:   Encounter Diagnosis     ICD-10-CM    1  Cervicalgia  M54 2                   Subjective: Pt reports that she feels about 5/10 in her cervical spine and pain all over her body  Objective: See treatment diary below      Assessment: Tolerated treatment well  Patient demonstrated fatigue post treatment, exhibited good technique with therapeutic exercises and would benefit from continued PT      Plan: Continue per plan of care  Eval/ Re-eval Auth #/ Referral # Total visits Start date  Expiration date Total active units  Total manual units  PT only or  PT+OT?                                                             Date:           Total authorized units:  Active:            Manual:           Total remaining units:  Active:               Manual:                Date:           Total authorized units: Active:            Manual:           Total remaining units:  Active:               Manual:                        Precautions depression, anxiety,     Specialty Daily Treatment Diary     Insurance:         Visits: 1 2      Manual: 3/17/22 3/22/22              STM/SOR  IM       UT stretch  IM                               Protocol:        Therapeutic Activity:        Reevaluation        Posture instruction  Educated pt               Therapeutic Exercise:                AROM cervical rotation  X 10       Self UT stretch  X 10 5s hold B       Scap retraction with tband ER  2 x 10 YTB B       Tubing rows  RTB x 20       scap isometrics  X 20 5s       scaption        pec stretch   X 10       Standing hip flexion        Sit/stand to mat table         SLR  X 5 B      sidestepping  X 5 15 ft       Step ups                        Neuro Neftali:        Stab cuff: cspine retraction                Gait Training:                 HEP:                Modalities        MH  Post- tx on Cx spine      Ice

## 2022-03-24 ENCOUNTER — OFFICE VISIT (OUTPATIENT)
Dept: PHYSICAL THERAPY | Facility: CLINIC | Age: 67
End: 2022-03-24
Payer: COMMERCIAL

## 2022-03-24 DIAGNOSIS — M54.2 CERVICALGIA: Primary | ICD-10-CM

## 2022-03-24 PROCEDURE — 97112 NEUROMUSCULAR REEDUCATION: CPT

## 2022-03-24 PROCEDURE — 97110 THERAPEUTIC EXERCISES: CPT

## 2022-03-24 PROCEDURE — 97140 MANUAL THERAPY 1/> REGIONS: CPT

## 2022-03-24 NOTE — PROGRESS NOTES
Daily Note     Today's date: 3/24/2022  Patient name: Amanda Rhodes  : 1955  MRN: [id number]  Referring provider: Adelso Urbina MD  Dx:   Encounter Diagnosis     ICD-10-CM    1  Cervicalgia  M54 2                   Subjective: Pt reports that she feels a little more mobile then before  Pt is complaining of cramping in her calf muscles  Objective: See treatment diary below      Assessment: Tolerated treatment well  Patient demonstrated fatigue post treatment, exhibited good technique with therapeutic exercises and would benefit from continued PT      Plan: Continue per plan of care  Eval/ Re-eval Auth #/ Referral # Total visits Start date  Expiration date Total active units  Total manual units  PT only or  PT+OT?                                                             Date:           Total authorized units:  Active:            Manual:           Total remaining units:  Active:               Manual:                Date:           Total authorized units: Active:            Manual:           Total remaining units:  Active:               Manual:                        Precautions depression, anxiety,     Specialty Daily Treatment Diary     Insurance:         Visits: 1 2 3     Manual: 3/17/22 3/22/22 3/24/22             STM/SOR  IM  IM      UT stretch  IM  IM                              Protocol:        Therapeutic Activity:        Reevaluation        Posture instruction  Educated pt  Educated pt              Therapeutic Exercise:                AROM cervical rotation  X 10  X 10 supine w/ 3s hold B      Self UT stretch  X 10 5s hold B  X 10 5s      Scap retraction with tband ER  2 x 10 YTB B  2 x 10 RTB B      Tubing rows  RTB x 20  RTB x 20 5s hold cues required     scap isometrics  X 20 5s  X 20 5s      scaption        pec stretch   X 10  X 10      Standing hip flexion        Sit/stand to mat table         SLR  X 5 B Quad sets x 10 3s hold     AASLR 2 x 5      sidestepping  X 5 15 ft  X 4 15 ft Step ups                        Neuro Neftali:        Stab cuff: cspine retraction                Gait Training:                 HEP:                Modalities        MH  Post- tx on Cx spine deferred     Ice

## 2022-03-29 ENCOUNTER — OFFICE VISIT (OUTPATIENT)
Dept: PHYSICAL THERAPY | Facility: CLINIC | Age: 67
End: 2022-03-29
Payer: COMMERCIAL

## 2022-03-29 DIAGNOSIS — M54.2 CERVICALGIA: Primary | ICD-10-CM

## 2022-03-29 PROCEDURE — 97112 NEUROMUSCULAR REEDUCATION: CPT

## 2022-03-29 PROCEDURE — 97110 THERAPEUTIC EXERCISES: CPT

## 2022-03-29 NOTE — PROGRESS NOTES
Daily Note         Today's date: 3/29/2022  Patient name: Huyen Dejesus  : 1955  MRN: [id number]  Referring provider: Italo Teran MD  Dx:   Encounter Diagnosis     ICD-10-CM    1  Cervicalgia  M54 2        Subjective: Huyen Dejesus reports most recent symptoms in right anterior wall pain, was sharp and sensitivite to touch  Is improving  Pain level entering today is: 3/10       Objective: See treatment diary below    Assessment:  upgraded HEp and issued written information  patient demonstrated independence  patient overall had excellent tolerance to program   The goal of the current treatment is to address patient's functional limitations as well as objective findings  Plan: progress program as tolerated  Eval/ Re-eval Auth #/ Referral # Total visits Start date  Expiration date Total active units  Total manual units  PT only or  PT+OT?    3/17/22 No auth needed, no visit restriction                       Precautions depression, anxiety,     Specialty Daily Treatment Diary     Insurance:     foto performed     Visits: 1 2 3 4    Manual: 3/17/22 3/22/22 3/24/22 3/29/22            STM/SOR  IM  IM  -    UT stretch  IM  IM  -                            Protocol:        Therapeutic Activity:        Reevaluation        Posture instruction  Educated pt  Educated pt              Therapeutic Exercise:        Nustep:     UE/LE lv 1  28 miles 7:07    AROM cervical rotation  X 10  X 10 supine w/ 3s hold B  3 sec x 10 luis enrique     Self UT stretch  X 10 5s hold B  X 10 5s  5 sec x 10     Scap retraction with tband ER  2 x 10 YTB B  2 x 10 RTB B  Loop: green 2 x 10      Tubing rows  RTB x 20  RTB x 20 5s hold cues required Red: 2 x 10      scap isometrics  X 20 5s  X 20 5s      scaption    2 x 10      pec stretch   X 10  X 10  5 sec x 10      Standing hip flexion        Sit/stand to mat table         SLR  X 5 B Quad sets x 10 3s hold     AASLR 2 x 5  5 sec x 10 each      SLR: 10 x AROM    sidestepping X 5 15 ft  X 4 15 ft  16 feet x 2 x 3 rounds     Step ups    2 x 10                      Neuro Neftali:        Stab cuff: cspine retraction                Gait Training:                 HEP:                Modalities        MH  Post- tx on Cx spine deferred     Ice                  Access Code: [email or web address]  URL: [email or web address]  Date: 03/29/2022  Prepared by:  Kael Larson    Exercises  · Shoulder External Rotation and Scapular Retraction with Resistance - 1 x daily - 7 x weekly - 2 sets - 10 reps  · Standing Shoulder Row with Anchored Resistance - 1 x daily - 7 x weekly - 2 sets - 10 reps  · Shoulder Extension with Resistance - 1 x daily - 7 x weekly - 2 sets - 10 reps  · Seated Scapular Retraction - 1 x daily - 7 x weekly - 1 sets - 10 reps - 5 sec hold  · Doorway Pec Stretch at 60 Elevation - 1 x daily - 7 x weekly - 1 sets - 10 reps - 5 sec hold  · Standing Shoulder Scaption - 1 x daily - 7 x weekly - 3 sets - 10 reps

## 2022-03-31 ENCOUNTER — APPOINTMENT (OUTPATIENT)
Dept: PHYSICAL THERAPY | Facility: CLINIC | Age: 67
End: 2022-03-31
Payer: COMMERCIAL

## 2022-04-01 DIAGNOSIS — K21.9 GASTROESOPHAGEAL REFLUX DISEASE, UNSPECIFIED WHETHER ESOPHAGITIS PRESENT: ICD-10-CM

## 2022-04-01 DIAGNOSIS — E03.8 OTHER SPECIFIED HYPOTHYROIDISM: ICD-10-CM

## 2022-04-01 RX ORDER — LEVOTHYROXINE SODIUM 0.05 MG/1
TABLET ORAL
Qty: 90 TABLET | Refills: 0 | Status: SHIPPED | OUTPATIENT
Start: 2022-04-01 | End: 2022-08-01

## 2022-04-01 RX ORDER — PANTOPRAZOLE SODIUM 40 MG/1
TABLET, DELAYED RELEASE ORAL
Qty: 90 TABLET | Refills: 1 | Status: SHIPPED | OUTPATIENT
Start: 2022-04-01

## 2022-04-03 ENCOUNTER — OFFICE VISIT (OUTPATIENT)
Dept: URGENT CARE | Facility: CLINIC | Age: 67
End: 2022-04-03
Payer: COMMERCIAL

## 2022-04-03 VITALS
OXYGEN SATURATION: 97 % | SYSTOLIC BLOOD PRESSURE: 122 MMHG | BODY MASS INDEX: 27.99 KG/M2 | TEMPERATURE: 99.8 F | DIASTOLIC BLOOD PRESSURE: 80 MMHG | HEART RATE: 91 BPM | HEIGHT: 65 IN | WEIGHT: 168 LBS | RESPIRATION RATE: 16 BRPM

## 2022-04-03 DIAGNOSIS — B34.9 ACUTE VIRAL DISEASE: Primary | ICD-10-CM

## 2022-04-03 LAB
FLUAV RNA RESP QL NAA+PROBE: NEGATIVE
FLUBV RNA RESP QL NAA+PROBE: NEGATIVE
SARS-COV-2 RNA RESP QL NAA+PROBE: NEGATIVE

## 2022-04-03 PROCEDURE — 99213 OFFICE O/P EST LOW 20 MIN: CPT | Performed by: PHYSICIAN ASSISTANT

## 2022-04-03 PROCEDURE — 87636 SARSCOV2 & INF A&B AMP PRB: CPT | Performed by: PHYSICIAN ASSISTANT

## 2022-04-03 NOTE — PROGRESS NOTES
[facility]        NAME: Val Glover is a 73 y o  female  : 1955    MRN: [id number]  DATE: April 3, 2022  TIME: 11:00 AM    Assessment and Plan   Acute viral disease [B34 9]  1  Acute viral disease  Covid/Flu-Office Collect     Patient Instructions   Acute viral illness, COVID vs influenza  Combo swab done, mychart for results  Self isolation until results received  Recommend mucinex for cough/congestion  Can also use flonase nasal spray  Recommend check in with PCP to make aware  Rest, fluids and supportive care  May benefit from a cool mist humidifier on night stand  Tylenol/ibuprofen as needed for pain/fever  Any worsening of symptoms or respiratory distress go to ER    Follow up with PCP in 3-5 days  Proceed to  ER if symptoms worsen  Chief Complaint     Chief Complaint   Patient presents with    Flu Symptoms     Pt reports sore throat diarrhea cough and headache, 3x days         History of Present Illness       Alex Schwartz is a 72-year-old female who presents to clinic complaining of nasal congestion x5 days  She also is complaining of myalgias, night sweats, cough, left ear pain, sore throat, chest tightness, and decreased appetite  She describes the cough is dry and nonproductive  She states the sore throat is mildly improving with rest her symptoms are worsening  She denies any chills, fatigue, shortness of breath, nausea, vomiting, diarrhea, loss of taste or smell, recent travel, or exposure to anyone known COVID positive  Review of Systems   Review of Systems   Constitutional: Positive for fatigue and fever  Negative for chills  HENT: Positive for congestion, ear pain and sore throat  Negative for rhinorrhea, sinus pressure and sinus pain  Respiratory: Positive for cough  Negative for shortness of breath  Gastrointestinal: Negative for diarrhea, nausea and vomiting  Musculoskeletal: Positive for myalgias  Neurological: Negative for headaches           Current Medications       Current Outpatient Medications:     albuterol (PROVENTIL HFA,VENTOLIN HFA) 90 mcg/act inhaler, Inhale 2 puffs every 4 (four) hours as needed for wheezing, Disp: 1 Inhaler, Rfl: 0    ALPRAZolam (XANAX) 0 5 mg tablet, Take 0 5 mg by mouth 2 (two) times a day as needed  , Disp: , Rfl:     atorvastatin (LIPITOR) 20 mg tablet, Take 1 tablet (20 mg total) by mouth daily, Disp: 90 tablet, Rfl: 3    betamethasone valerate (VALISONE) 0 1 % cream, , Disp: , Rfl:     calcium carbonate (OS-WILLY) 1250 (500 Ca) MG tablet, Take 1,250 mg by mouth, Disp: , Rfl:     ciclopirox (LOPROX) 0 77 % cream, , Disp: , Rfl:     DULoxetine (CYMBALTA) 30 mg delayed release capsule, Take 1 capsule (30 mg total) by mouth daily, Disp: 90 capsule, Rfl: 0    DULoxetine (CYMBALTA) 60 mg delayed release capsule, Take 1 capsule (60 mg total) by mouth daily, Disp: 90 capsule, Rfl: 0    gabapentin (NEURONTIN) 300 mg capsule, Take 2 capsules (600 mg total) by mouth 3 (three) times a day, Disp: 180 capsule, Rfl: 5    ketorolac (ACULAR) 0 5 % ophthalmic solution, Administer 1 drop into the left eye 4 (four) times a day, Disp: 5 mL, Rfl: 0    L-Methylfolate-Algae-B12-B6 (Metanx) 3-90 314-2-35 MG CAPS, Take 1 tablet by mouth 2 (two) times a day, Disp: 60 capsule, Rfl: 2    levothyroxine 50 mcg tablet, TAKE 1 TABLET BY MOUTH EVERY DAY, Disp: 90 tablet, Rfl: 0    Magnesium 400 MG TABS, Take by mouth daily, Disp: , Rfl:     metoprolol succinate (TOPROL-XL) 25 mg 24 hr tablet, , Disp: , Rfl:     midodrine (PROAMATINE) 10 MG tablet, Take 1 tablet (10 mg total) by mouth 3 (three) times a day, Disp: 90 tablet, Rfl: 2    pantoprazole (PROTONIX) 40 mg tablet, TAKE 1 TABLET BY MOUTH EVERY DAY, Disp: 90 tablet, Rfl: 1    QUEtiapine (SEROquel) 100 mg tablet, Take 1 tablet (100 mg total) by mouth daily at bedtime, Disp: 90 tablet, Rfl: 0    Sodium Fluoride 5000 Sensitive 1 1-5 % GEL, , Disp: , Rfl:     traZODone (DESYREL) 50 mg tablet, TAKE 1 TABLET BY MOUTH DAILY AT BEDTIME, Disp: 90 tablet, Rfl: 0    Current Allergies     Allergies as of 04/03/2022 - Reviewed 04/03/2022   Allergen Reaction Noted    Demerol [meperidine] Lightheadedness 01/11/2006    Sulfa antibiotics Hives 01/11/2006            The following portions of the patient's history were reviewed and updated as appropriate: allergies, current medications, past family history, past medical history, past social history, past surgical history and problem list      Past Medical History:   Diagnosis Date    Abdominal adhesions     Anesthesia complication     difficult to wake up    Anxiety     Depression     Depression     Disease of thyroid gland     Fibromyalgia     Fractures 2006    GERD (gastroesophageal reflux disease)     History of cholecystectomy 9/7/2019    Lazy eye     resolved: 3/27/17    Medical clearance for psychiatric admission 7/13/2021    Melanoma ([address] ) 2010    Osteoarthritis 7/2018    Osteopenia     with joint pain-elevated DEV    Psychiatric disorder     Stomach disorder 1995    Tinnitus     Wears glasses     for reading       Past Surgical History:   Procedure Laterality Date    ABDOMINAL SURGERY      lysis of adhesions x 2    APPENDECTOMY      CERVICAL FUSION      May 5,2021 and Jan 01,2020    CHOLECYSTECTOMY      open    DILATION AND CURETTAGE OF UTERUS      FOOT SURGERY  5/2017    NECK SURGERY  1/2019 5/2021    NEUROMA EXCISION Right 5/26/2017    Procedure: EXCISION MASS / FIBROMA FOOT;  Surgeon: Josemanuel Reeves DPM;  Location: [address] [place] [address];  Service:    Adria Pali PLANA VITRECTOMY W/ REPAIR OF MACULAR HOLE  12/23/2020    AR XCAPSL CTRC RMVL INSJ IO LENS PROSTH W/O ECP Left 12/6/2021    Procedure: EXTRACTION EXTRACAPSULAR CATARACT PHACO INTRAOCULAR LENS (IOL);   Surgeon: Tonio Wolf MD;  Location[facility] MAIN OR;  Service: Ophthalmology    RIGHT OOPHORECTOMY      WISDOM TOOTH EXTRACTION      x4       Family History   Problem Relation Age of Onset    Heart disease Mother     Stroke Mother 58    COPD Mother     Arthritis Mother     Hypertension Father     Kidney disease Brother         kidney transplant    Multiple sclerosis Sister     No Known Problems Maternal Aunt     No Known Problems Maternal Uncle     No Known Problems Paternal Aunt     No Known Problems Paternal Uncle     No Known Problems Maternal Grandmother     No Known Problems Maternal Grandfather     No Known Problems Paternal Grandmother     No Known Problems Paternal Grandfather     Dislocations Sister     Neurological problems Sister     Scoliosis Sister     ADD / ADHD Neg Hx     Anesthesia problems Neg Hx     Cancer Neg Hx     Clotting disorder Neg Hx     Collagen disease Neg Hx     Diabetes Neg Hx     Dislocations Neg Hx     Learning disabilities Neg Hx     Neurological problems Neg Hx     Osteoporosis Neg Hx     Rheumatologic disease Neg Hx     Scoliosis Neg Hx     Vascular Disease Neg Hx          Medications have been verified  Objective   /80   Pulse 91   Temp 99 8 °F (37 7 °C)   Resp 16   Ht 5' 5" (1 651 m)   Wt 76 2 kg (168 lb)   LMP  (LMP Unknown)   SpO2 97%   BMI 27 96 kg/m²   No LMP recorded (lmp unknown)  Patient is postmenopausal        Physical Exam     Physical Exam  Vitals and nursing note reviewed  Constitutional:       General: She is not in acute distress  Appearance: Normal appearance  She is not ill-appearing  HENT:      Right Ear: Tympanic membrane, ear canal and external ear normal       Left Ear: Tympanic membrane, ear canal and external ear normal       Nose: Congestion present  Mouth/Throat:      Mouth: Mucous membranes are moist       Pharynx: No oropharyngeal exudate or posterior oropharyngeal erythema  Cardiovascular:      Rate and Rhythm: Normal rate and regular rhythm  Heart sounds: Normal heart sounds     Pulmonary:      Effort: Pulmonary effort is normal       Breath sounds: Normal breath sounds  Lymphadenopathy:      Cervical: No cervical adenopathy  Neurological:      Mental Status: She is alert and oriented to person, place, and time     Psychiatric:         Mood and Affect: Mood normal          Behavior: Behavior normal

## 2022-04-03 NOTE — PATIENT INSTRUCTIONS
Acute viral illness, COVID vs influenza  Combo swab done, mychart for results  Self isolation until results received  Recommend mucinex for cough/congestion  Can also use flonase nasal spray  Recommend check in with PCP to make aware  Rest, fluids and supportive care  May benefit from a cool mist humidifier on night stand  Tylenol/ibuprofen as needed for pain/fever  Any worsening of symptoms or respiratory distress go to ER    Follow up with PCP in 3-5 days  Proceed to  ER if symptoms worsen

## 2022-04-05 ENCOUNTER — APPOINTMENT (OUTPATIENT)
Dept: PHYSICAL THERAPY | Facility: CLINIC | Age: 67
End: 2022-04-05
Payer: COMMERCIAL

## 2022-04-05 DIAGNOSIS — I95.1 ORTHOSTATIC HYPOTENSION: ICD-10-CM

## 2022-04-05 RX ORDER — MIDODRINE HYDROCHLORIDE 10 MG/1
10 TABLET ORAL 3 TIMES DAILY
Qty: 90 TABLET | Refills: 2 | Status: SHIPPED | OUTPATIENT
Start: 2022-04-05 | End: 2022-07-05

## 2022-04-05 NOTE — TELEPHONE ENCOUNTER
Medication refill requested for midodrine 10 mg tablets    Last refilled on 10/29/21  L  O V 12/01/21  Medication pended

## 2022-04-07 ENCOUNTER — OFFICE VISIT (OUTPATIENT)
Dept: PHYSICAL THERAPY | Facility: CLINIC | Age: 67
End: 2022-04-07
Payer: COMMERCIAL

## 2022-04-07 DIAGNOSIS — M54.2 CERVICALGIA: Primary | ICD-10-CM

## 2022-04-07 PROCEDURE — 97110 THERAPEUTIC EXERCISES: CPT

## 2022-04-07 PROCEDURE — 97112 NEUROMUSCULAR REEDUCATION: CPT

## 2022-04-07 NOTE — PROGRESS NOTES
Daily Note     Today's date: 2022  Patient name: Magalie Briscoe  : 1955  MRN: [id number]  Referring provider: Shawn Mejia MD  Dx:   Encounter Diagnosis     ICD-10-CM    1  Cervicalgia  M54 2                   Subjective:Pt reports that she is feeling better and has noted her neck pain has improved  Objective: See treatment diary below      Assessment: Tolerated treatment well  Patient demonstrated fatigue post treatment, exhibited good technique with therapeutic exercises and would benefit from continued PT      Plan: Continue per plan of care  Eval/ Re-eval Auth #/ Referral # Total visits Start date  Expiration date Total active units  Total manual units  PT only or  PT+OT?    3/17/22 No auth needed, no visit restriction                       Precautions depression, anxiety,     Specialty Daily Treatment Diary     Insurance:     foto performed     Visits: 1 2 3 4 5   Manual: 3/17/22 3/22/22 3/24/22 3/29/22 4/7/22           STM/SOR  IM  IM  - IM    UT stretch  IM  IM  - IM                            Protocol:        Therapeutic Activity:        Reevaluation        Posture instruction  Educated pt  Educated pt              Therapeutic Exercise:        Nustep:     UE/LE lv 1  28 miles 7:07 UE/LE lv 1  30 miles 6 34   AROM cervical rotation  X 10  X 10 supine w/ 3s hold B  3 sec x 10 luis enrique     Self UT stretch  X 10 5s hold B  X 10 5s  5 sec x 10     Scap retraction with tband ER  2 x 10 YTB B  2 x 10 RTB B  Loop: green 2 x 10   Green loop 2 x 10    Tubing rows  RTB x 20  RTB x 20 5s hold cues required Red: 2 x 10   Red: 2 x 10    scap isometrics  X 20 5s  X 20 5s      scaption    2 x 10   2 x 10    pec stretch   X 10  X 10  5 sec x 10   5s x 10    Standing hip flexion        Sit/stand to mat table         SLR  X 5 B Quad sets x 10 3s hold     AASLR 2 x 5  5 sec x 10 each      SLR: 10 x AROM 5s x 10 ea/       SLR: 10x AROM    sidestepping  X 5 15 ft  X 4 15 ft  16 feet x 2 x 3 rounds  16 fet x 2 x 3    Step ups    2 x 10   2 x 10                    Neuro Neftali:        Stab cuff: cspine retraction                Gait Training:                 HEP:                Modalities        MH  Post- tx on Cx spine deferred     Ice                  Access Code: [email or web address]  URL: [email or web address]  Date: 03/29/2022  Prepared by:  Ann-Marie Ace    Exercises  · Shoulder External Rotation and Scapular Retraction with Resistance - 1 x daily - 7 x weekly - 2 sets - 10 reps  · Standing Shoulder Row with Anchored Resistance - 1 x daily - 7 x weekly - 2 sets - 10 reps  · Shoulder Extension with Resistance - 1 x daily - 7 x weekly - 2 sets - 10 reps  · Seated Scapular Retraction - 1 x daily - 7 x weekly - 1 sets - 10 reps - 5 sec hold  · Doorway Pec Stretch at 60 Elevation - 1 x daily - 7 x weekly - 1 sets - 10 reps - 5 sec hold  · Standing Shoulder Scaption - 1 x daily - 7 x weekly - 3 sets - 10 reps

## 2022-04-14 NOTE — TELEPHONE ENCOUNTER
----- Message from Delilah Main sent at 2018  4:59 PM EDT -----  Regarding: FW: Visit Follow-Up Question  Contact: [phone]  Telephone encounter created  ----- Message -----  From: Katie Morrow  Sent: 2018   7:56 AM  To: [facility]  Subject: Visit Follow-Up Question                         Hello - I have followed up with Dr Jasmine Perez and Dr Tasha Colunga this week  Dr Jasmine Perez ordered blood work (10 vials drawn yesterday, ) and I saw Dr aTsha Colunga yesterday  He does not feel my symptoms are neurological based on MRIs, but did not rule out peripheral nephropathy and scheduled EMG for 18  I am still having the pain primarily in right foot although left hurts also  Right has kept me up 2 nights since I stopped the Tylenol 2 that [facility] prescribed  I still feel that there is something neurological going on due to balance, failed finger-nose test on left only and other issues related to inability to control muscles mac  with left hand  Dr Tasha Colunga suggested I return to [facility] to deal with the foot pain  I would like to see an Orthopedic MD not Podiatrist   Should I contact [facility] on my own? I also have tingling in fingers (both hands) and other sporadic shooting pains in both legs as well as weakness in legs when climbing stairs  Physical therapy is coming to  the house 2-3 days a week  I am not steady on my feet, and struggle getting out of chair  I fell on 4/3 and bruised my right knee pretty bad  I have an FMLA leave due to  on  and would like another 30 days to allow time for the following:  Blood work from Dr Wally Grande followup  Dr Yasmin Wall visual field test on  to determine if I can drive   EMG schedule for   There is no form to fill out, just an update to : [facility]  , [address]   [address] [facility], [place], [facility] [address]  Fax [phone]  Thank you, Monika Tolbert 1955   [phone]
I did not see her for her TCM and we did not do her original FMLA so I cannot extend it  I would have her speak to the doctor that did her original FMLA or one of her specialists, as they have better information on how long she will need to be out of work 
Please see other task regarding this  No further action required for this task   Kaye Perez
Tylenol 975mg and Ibuprofen 600mg every 6 hours as needed

## 2022-04-18 ENCOUNTER — TELEPHONE (OUTPATIENT)
Dept: PSYCHIATRY | Facility: CLINIC | Age: 67
End: 2022-04-18

## 2022-04-18 ENCOUNTER — OFFICE VISIT (OUTPATIENT)
Dept: FAMILY MEDICINE CLINIC | Facility: CLINIC | Age: 67
End: 2022-04-18
Payer: COMMERCIAL

## 2022-04-18 VITALS
WEIGHT: 170 LBS | HEIGHT: 65 IN | HEART RATE: 80 BPM | RESPIRATION RATE: 18 BRPM | DIASTOLIC BLOOD PRESSURE: 80 MMHG | BODY MASS INDEX: 28.32 KG/M2 | TEMPERATURE: 97.2 F | SYSTOLIC BLOOD PRESSURE: 124 MMHG

## 2022-04-18 DIAGNOSIS — N39.0 ACUTE UTI: Primary | ICD-10-CM

## 2022-04-18 DIAGNOSIS — J06.9 UPPER RESPIRATORY TRACT INFECTION, UNSPECIFIED TYPE: ICD-10-CM

## 2022-04-18 PROBLEM — Z85.820 HISTORY OF MELANOMA: Status: ACTIVE | Noted: 2019-04-11

## 2022-04-18 PROCEDURE — 1160F RVW MEDS BY RX/DR IN RCRD: CPT | Performed by: FAMILY MEDICINE

## 2022-04-18 PROCEDURE — 1036F TOBACCO NON-USER: CPT | Performed by: FAMILY MEDICINE

## 2022-04-18 PROCEDURE — 99213 OFFICE O/P EST LOW 20 MIN: CPT | Performed by: FAMILY MEDICINE

## 2022-04-18 RX ORDER — AMOXICILLIN 500 MG/1
500 CAPSULE ORAL EVERY 8 HOURS SCHEDULED
Qty: 42 CAPSULE | Refills: 0 | Status: SHIPPED | OUTPATIENT
Start: 2022-04-18 | End: 2022-05-02

## 2022-04-18 RX ORDER — PREDNISOLONE ACETATE 10 MG/ML
SUSPENSION/ DROPS OPHTHALMIC
COMMUNITY
Start: 2022-03-02 | End: 2022-05-18

## 2022-04-18 RX ORDER — BENZONATATE 200 MG/1
200 CAPSULE ORAL 2 TIMES DAILY PRN
Qty: 20 CAPSULE | Refills: 0 | Status: SHIPPED | OUTPATIENT
Start: 2022-04-18 | End: 2022-05-18

## 2022-04-18 NOTE — PROGRESS NOTES
Assessment/Plan:    1  Acute UTI  Comments:  concerned about how achy she is and risk of Lyme; unable to provide urine specimen  Orders:  -     amoxicillin (AMOXIL) 500 mg capsule; Take 1 capsule (500 mg total) by mouth every 8 (eight) hours for 14 days    2  Upper respiratory tract infection, unspecified type  -     benzonatate (TESSALON) 200 MG capsule; Take 1 capsule (200 mg total) by mouth 2 (two) times a day as needed for cough         There are no Patient Instructions on file for this visit  Return if symptoms worsen or fail to improve  Subjective:      Patient ID: Mukesh Juan is a 73 y o  female  Chief Complaint   Patient presents with    Cough     bring up mucus that she states tastes like blood  jmcma    joint pain     swelling and pain in elbows and shins   Possible UTI       She had bronchitis 2 weeks ago  She got better, then last night she started coughing again  She has had swelling in her elbows and her left shin  Her shin has stabbing pain  She has had bad smelling urine for the past week  She has urgency, but no dysuria         The following portions of the patient's history were reviewed and updated as appropriate:  past social history    Review of Systems      Current Outpatient Medications   Medication Sig Dispense Refill    albuterol (PROVENTIL HFA,VENTOLIN HFA) 90 mcg/act inhaler Inhale 2 puffs every 4 (four) hours as needed for wheezing 1 Inhaler 0    ALPRAZolam (XANAX) 0 5 mg tablet Take 0 5 mg by mouth 2 (two) times a day as needed        atorvastatin (LIPITOR) 20 mg tablet Take 1 tablet (20 mg total) by mouth daily 90 tablet 3    calcium carbonate (OS-WILLY) 1250 (500 Ca) MG tablet Take 1,250 mg by mouth      DULoxetine (CYMBALTA) 30 mg delayed release capsule Take 1 capsule (30 mg total) by mouth daily 90 capsule 0    DULoxetine (CYMBALTA) 60 mg delayed release capsule Take 1 capsule (60 mg total) by mouth daily 90 capsule 0    gabapentin (NEURONTIN) 300 mg capsule Take 2 capsules (600 mg total) by mouth 3 (three) times a day 180 capsule 5    L-Methylfolate-Algae-B12-B6 (Metanx) 3-90 314-2-35 MG CAPS Take 1 tablet by mouth 2 (two) times a day 60 capsule 2    levothyroxine 50 mcg tablet TAKE 1 TABLET BY MOUTH EVERY DAY 90 tablet 0    Magnesium 400 MG TABS Take by mouth daily      metoprolol succinate (TOPROL-XL) 25 mg 24 hr tablet       midodrine (PROAMATINE) 10 MG tablet Take 1 tablet (10 mg total) by mouth 3 (three) times a day 90 tablet 2    pantoprazole (PROTONIX) 40 mg tablet TAKE 1 TABLET BY MOUTH EVERY DAY 90 tablet 1    prednisoLONE acetate (PRED FORTE) 1 % ophthalmic suspension INSTILL 1 DROP INTO RIGHT EYE 4 TIMES PER DAY      QUEtiapine (SEROquel) 100 mg tablet Take 1 tablet (100 mg total) by mouth daily at bedtime 90 tablet 0    Sodium Fluoride 5000 Sensitive 1 1-5 % GEL       amoxicillin (AMOXIL) 500 mg capsule Take 1 capsule (500 mg total) by mouth every 8 (eight) hours for 14 days 42 capsule 0    benzonatate (TESSALON) 200 MG capsule Take 1 capsule (200 mg total) by mouth 2 (two) times a day as needed for cough 20 capsule 0    betamethasone valerate (VALISONE) 0 1 % cream  (Patient not taking: Reported on 4/18/2022 )      ciclopirox (LOPROX) 0 77 % cream  (Patient not taking: Reported on 4/18/2022 )      traZODone (DESYREL) 50 mg tablet TAKE 1 TABLET BY MOUTH DAILY AT BEDTIME (Patient not taking: Reported on 4/18/2022) 90 tablet 0     No current facility-administered medications for this visit  Objective:    /80   Pulse 80   Temp (!) 97 2 °F (36 2 °C)   Resp 18   Ht 5' 5" (1 651 m)   Wt 77 1 kg (170 lb)   LMP  (LMP Unknown)   BMI 28 29 kg/m²      Physical Exam  Vitals and nursing note reviewed  Constitutional:       Appearance: She is well-developed  HENT:      Head: Normocephalic and atraumatic        Right Ear: Tympanic membrane and external ear normal       Left Ear: Tympanic membrane and external ear normal  Cardiovascular:      Rate and Rhythm: Normal rate and regular rhythm  Heart sounds: Normal heart sounds  No murmur heard  No friction rub  Pulmonary:      Effort: Pulmonary effort is normal  No respiratory distress  Breath sounds: Normal breath sounds  No wheezing or rales  Abdominal:      General: Abdomen is flat  Palpations: Abdomen is soft  Tenderness: There is no abdominal tenderness  Musculoskeletal:      Right lower leg: No edema  Left lower leg: No edema               Cristina Herbert, DO

## 2022-04-19 ENCOUNTER — TELEMEDICINE (OUTPATIENT)
Dept: BEHAVIORAL/MENTAL HEALTH CLINIC | Facility: CLINIC | Age: 67
End: 2022-04-19
Payer: COMMERCIAL

## 2022-04-19 ENCOUNTER — APPOINTMENT (OUTPATIENT)
Dept: PHYSICAL THERAPY | Facility: CLINIC | Age: 67
End: 2022-04-19
Payer: COMMERCIAL

## 2022-04-19 DIAGNOSIS — F33.41 MAJOR DEPRESSIVE DISORDER, RECURRENT, IN PARTIAL REMISSION (HCC): Primary | ICD-10-CM

## 2022-04-19 DIAGNOSIS — F41.1 GENERALIZED ANXIETY DISORDER: ICD-10-CM

## 2022-04-19 PROCEDURE — 90834 PSYTX W PT 45 MINUTES: CPT | Performed by: SOCIAL WORKER

## 2022-04-19 NOTE — PSYCH
Virtual Regular Visit    Verification of patient location:    Patient is located in the following state in which I hold an active license [place]      Assessment/Plan:    Problem List Items Addressed This Visit        Other    Generalized anxiety disorder    Major depressive disorder, recurrent, in partial remission ([facility] [address] ) - Primary          Goals addressed in session: Goal 1          Reason for visit is   Chief Complaint   Patient presents with    Virtual Regular Visit        Encounter provider Willie Gaines LCSW    Provider located at [address] [place] [address]  [address] [place] [address]  [phone]      Recent Visits  Date Type Provider Dept   04/18/22 Telephone Willie Gaines LCSW [facility]   Showing recent visits within past 7 days and meeting all other requirements  Future Appointments  No visits were found meeting these conditions  Showing future appointments within next 150 days and meeting all other requirements       The patient was identified by name and date of birth  Magalie Briscoe was informed that this is a telemedicine visit and that the visit is being conducted throughEpic Embedded and patient was informed this is a secure, HIPAA-complaint platform  She agrees to proceed     My office door was closed  No one else was in the room  She acknowledged consent and understanding of privacy and security of the video platform  The patient has agreed to participate and understands they can discontinue the visit at any time  Patient is aware this is a billable service  Subjective  Magalie Briscoe is a 73 y o  female        HPI     Past Medical History:   Diagnosis Date    Abdominal adhesions     Anesthesia complication     difficult to wake up    Anxiety     Depression     Depression     Disease of thyroid gland     Fibromyalgia     Fractures 2006    GERD (gastroesophageal reflux disease)     History of cholecystectomy 9/7/2019    Lazy eye     resolved: 3/27/17    Medical clearance for psychiatric admission 7/13/2021    Melanoma ([address] ) 2010    Osteoarthritis 7/2018    Osteopenia     with joint pain-elevated DEV    Psychiatric disorder     Stomach disorder 1995    Tinnitus     Wears glasses     for reading       Past Surgical History:   Procedure Laterality Date    ABDOMINAL SURGERY      lysis of adhesions x 2    APPENDECTOMY      CERVICAL FUSION      May 5,2021 and Jan 01,2020    CHOLECYSTECTOMY      open    DILATION AND CURETTAGE OF UTERUS      FOOT SURGERY  5/2017    NECK SURGERY  1/2019 5/2021    NEUROMA EXCISION Right 5/26/2017    Procedure: EXCISION MASS / FIBROMA FOOT;  Surgeon: Nadia Dean DPM;  Location: [address] [place] [address];  Service:    Yogi Boas PLANA VITRECTOMY W/ REPAIR OF MACULAR HOLE  12/23/2020    MA XCAPSL CTRC RMVL INSJ IO LENS PROSTH W/O ECP Left 12/6/2021    Procedure: EXTRACTION EXTRACAPSULAR CATARACT PHACO INTRAOCULAR LENS (IOL);   Surgeon: Donato Boyd MD;  Location: [facility] MAIN OR;  Service: Ophthalmology    RIGHT OOPHORECTOMY      WISDOM TOOTH EXTRACTION      x4       Current Outpatient Medications   Medication Sig Dispense Refill    albuterol (PROVENTIL HFA,VENTOLIN HFA) 90 mcg/act inhaler Inhale 2 puffs every 4 (four) hours as needed for wheezing 1 Inhaler 0    ALPRAZolam (XANAX) 0 5 mg tablet Take 0 5 mg by mouth 2 (two) times a day as needed        amoxicillin (AMOXIL) 500 mg capsule Take 1 capsule (500 mg total) by mouth every 8 (eight) hours for 14 days 42 capsule 0    atorvastatin (LIPITOR) 20 mg tablet Take 1 tablet (20 mg total) by mouth daily 90 tablet 3    benzonatate (TESSALON) 200 MG capsule Take 1 capsule (200 mg total) by mouth 2 (two) times a day as needed for cough 20 capsule 0    betamethasone valerate (VALISONE) 0 1 % cream  (Patient not taking: Reported on 4/18/2022 )      calcium carbonate (OS-WILLY) 1250 (500 Ca) MG tablet Take 1,250 mg by mouth      ciclopirox (LOPROX) 0 77 % cream  (Patient not taking: Reported on 4/18/2022 )      DULoxetine (CYMBALTA) 30 mg delayed release capsule Take 1 capsule (30 mg total) by mouth daily 90 capsule 0    DULoxetine (CYMBALTA) 60 mg delayed release capsule Take 1 capsule (60 mg total) by mouth daily 90 capsule 0    gabapentin (NEURONTIN) 300 mg capsule Take 2 capsules (600 mg total) by mouth 3 (three) times a day 180 capsule 5    L-Methylfolate-Algae-B12-B6 (Metanx) 3-90 314-2-35 MG CAPS Take 1 tablet by mouth 2 (two) times a day 60 capsule 2    levothyroxine 50 mcg tablet TAKE 1 TABLET BY MOUTH EVERY DAY 90 tablet 0    Magnesium 400 MG TABS Take by mouth daily      metoprolol succinate (TOPROL-XL) 25 mg 24 hr tablet       midodrine (PROAMATINE) 10 MG tablet Take 1 tablet (10 mg total) by mouth 3 (three) times a day 90 tablet 2    pantoprazole (PROTONIX) 40 mg tablet TAKE 1 TABLET BY MOUTH EVERY DAY 90 tablet 1    prednisoLONE acetate (PRED FORTE) 1 % ophthalmic suspension INSTILL 1 DROP INTO RIGHT EYE 4 TIMES PER DAY      QUEtiapine (SEROquel) 100 mg tablet Take 1 tablet (100 mg total) by mouth daily at bedtime 90 tablet 0    Sodium Fluoride 5000 Sensitive 1 1-5 % GEL       traZODone (DESYREL) 50 mg tablet TAKE 1 TABLET BY MOUTH DAILY AT BEDTIME (Patient not taking: Reported on 4/18/2022) 90 tablet 0     No current facility-administered medications for this visit  Allergies   Allergen Reactions    Demerol [Meperidine] Lightheadedness    Sulfa Antibiotics Hives       Review of Systems    Video Exam    There were no vitals filed for this visit  Physical Exam     I spent 45 minutes directly with the patient during this visit    VIRTUAL VISIT DISCLAIMER    Erika Deshpande verbally agrees to participate in [employer]   Pt is aware that [employer] could be limited without vital signs or the ability to perform a full hands-on physical Nilesh Greco understands she or the provider may request at any time to terminate the video visit and request the patient to seek care or treatment in person  This note was not shared with the patient due to this is a psychotherapy note     Psychotherapy Provided: Individual Psychotherapy 45 minutes     Length of time in session: 45 minutes, follow up in 2 week    Encounter Diagnosis     ICD-10-CM    1  Major depressive disorder, recurrent, in partial remission (HCC)  F33 41    2  Generalized anxiety disorder  F41 1        Goals addressed in session: Goal 1 and Goal 2     Pain:      none    0    Current suicide risk : Low     DATA: Met with Andrez Torrez for scheduled individual session  She reported having bronchitis and thus thanked writer for accommodating the virtual appointment  She reported having a pleasant holiday with her family and purposely avoiding "family drama," which included her sister and her finances  She continues to work part time for [employer] and is enjoying it  She reported discontinuing her Trazodone due to feeling groggy in the morning and shared that her ultimate plan is to discontinue "as much medicine as possible"  Writer cautioned her to speak to her provider about this before doing so on her own; she agreed  ASSESSMENT: Andrez Torrez presents with a normal mood  Her affect is normal range and intensity, appropriate  Andrez Torrez exhibits good therapeutic rapport with this clinician  Andrez Torrez continues to exhibit willingness to work on treatment goals and objectives  Andrez Torrez presents with a minimal risk of suicide, minimal risk of self-harm, and minimal risk of harm to others  PLAN: Andrez Torrez will return in 2 weeks for the next scheduled session  Between sessions, Andrez Torrez will practice mindfulness with regard to how she speaks about herself and how and will report back during the next session re: successes and barriers   At the next session, this clinician will use supportive psychotherapy, mindfulness-based strategies and CBT techniques to address maintenance of symptom stabilization, in an effort to assist Bud Hollins with meeting treatment goals  Behavioral Health Treatment Plan [facility]: Diagnosis and Treatment Plan explained to Honey Reilly relates understanding diagnosis and is agreeable to Treatment Plan   Yes

## 2022-04-21 ENCOUNTER — TELEPHONE (OUTPATIENT)
Dept: GASTROENTEROLOGY | Facility: CLINIC | Age: 67
End: 2022-04-21

## 2022-04-21 ENCOUNTER — OFFICE VISIT (OUTPATIENT)
Dept: PHYSICAL THERAPY | Facility: CLINIC | Age: 67
End: 2022-04-21
Payer: COMMERCIAL

## 2022-04-21 DIAGNOSIS — M54.2 CERVICALGIA: Primary | ICD-10-CM

## 2022-04-21 PROCEDURE — 97112 NEUROMUSCULAR REEDUCATION: CPT

## 2022-04-21 PROCEDURE — 97110 THERAPEUTIC EXERCISES: CPT

## 2022-04-21 NOTE — PROGRESS NOTES
Daily Note     Today's date: 2022  Patient name: Asmita Rosen  : 1955  MRN: [id number]  Referring provider: Shirley Agustin MD  Dx:   Encounter Diagnosis     ICD-10-CM    1  Cervicalgia  M54 2                   Subjective: Pt reports that overall she is feeling better and has noted improvements in symptoms  States that she has noted mild discomfort along her calf muscle however, states that those symptoms come and go  Objective: See treatment diary below      Assessment: Tolerated treatment well  Patient demonstrated fatigue post treatment, exhibited good technique with therapeutic exercises and would benefit from continued PT      Plan: Continue per plan of care  Eval/ Re-eval Auth #/ Referral # Total visits Start date  Expiration date Total active units  Total manual units  PT only or  PT+OT?    3/17/22 No auth needed, no visit restriction                       Precautions depression, anxiety,     Specialty Daily Treatment Diary     Insurance:     foto performed     Visits: 6 2 3 4 5   Manual: 4/21/22 3/22/22 3/24/22 3/29/22 4/7/22           STM/SOR IM  IM  IM  - IM    UT stretch IM  IM  IM  - IM                            Protocol:        Therapeutic Activity:        Reevaluation        Posture instruction  Educated pt  Educated pt              Therapeutic Exercise:        Nustep:  UE/LE lvl  miles 6 34    UE/LE lv 1  28 miles 7:07 UE/LE lv 1  30 miles 6 34   AROM cervical rotation 3s x 10 B  X 10  X 10 supine w/ 3s hold B  3 sec x 10 luis enrique     Self UT stretch 5s x 10  X 10 5s hold B  X 10 5s  5 sec x 10     Scap retraction with tband ER Loop green 2 x 10  2 x 10 YTB B  2 x 10 RTB B  Loop: green 2 x 10   Green loop 2 x 10    Tubing rows RTB x 20  RTB x 20  RTB x 20 5s hold cues required Red: 2 x 10   Red: 2 x 10    scap isometrics  X 20 5s  X 20 5s      scaption    2 x 10   2 x 10    pec stretch  X 10  X 10  X 10  5 sec x 10   5s x 10    Standing hip flexion X 10        Sit/stand to mat table         SLR 5s x 10       SLR: 10x AROM X 5 B Quad sets x 10 3s hold     AASLR 2 x 5  5 sec x 10 each      SLR: 10 x AROM 5s x 10 ea/       SLR: 10x AROM    sidestepping 16 ft x 2 x 3  X 5 15 ft  X 4 15 ft  16 feet x 2 x 3 rounds  16 fet x 2 x 3    Step ups 2 x 10    2 x 10   2 x 10                    Neuro Neftali:        Stab cuff: cspine retraction                Gait Training:                 HEP:                Modalities        MH  Post- tx on Cx spine deferred     Ice                  Access Code: [email or web address]  URL: [email or web address]  Date: 03/29/2022  Prepared by:  Ana Choi    Exercises  · Shoulder External Rotation and Scapular Retraction with Resistance - 1 x daily - 7 x weekly - 2 sets - 10 reps  · Standing Shoulder Row with Anchored Resistance - 1 x daily - 7 x weekly - 2 sets - 10 reps  · Shoulder Extension with Resistance - 1 x daily - 7 x weekly - 2 sets - 10 reps  · Seated Scapular Retraction - 1 x daily - 7 x weekly - 1 sets - 10 reps - 5 sec hold  · Doorway Pec Stretch at 60 Elevation - 1 x daily - 7 x weekly - 1 sets - 10 reps - 5 sec hold  · Standing Shoulder Scaption - 1 x daily - 7 x weekly - 3 sets - 10 reps

## 2022-04-21 NOTE — TELEPHONE ENCOUNTER
[facility] Assessment    Name: Edward Tsai  YOB: 1955  Last Height: 5' 5" (1 651 m)  Last weight: 77 1 kg (170 lb)  BMI: 28 29 kg/m²  Procedure: colon  Diagnosis: polyps  Date of procedure: 5/27/22  Prep: miralax and mag cit  Responsible : yes  Phone#: [phone]  Name completing form: Alfonso Mccurdy  Date form completed: 04/21/22    If the patient answers yes to any of these questions, schedule in a hospital  Are you pregnant: No  Do you rely on a wheelchair for mobility: No  Have you been diagnosed with End Stage Renal Disease (ESRD): No  Do you need oxygen during the day: No  Have you had a heart attack or stroke within the past three months: No  Have you had a seizure within the past three months: No  Have you ever been informed by anesthesia that you have a difficult airway: No  Additional Questions  Have you had any cardiac testing or are under the care of a Cardiologist (see cardiac list): No  Cardiac list:   Do you have an implanted cardiac defibrillator: No (Comment:  This patient should be scheduled in the hospital)    Have any bleeding problems, such as anemia or hemophilia (If patient has H&H result below 8, schedule in hospital   H&H must be within 30 days of procedure): No    Had an organ transplant within the past 3 months: No    Do you have any present infections: No  Do you get short of breath when walking a few blocks: No  Have you been diagnosed with diabetes: No  Comments (provide cardiac provider information if applicable):

## 2022-04-26 ENCOUNTER — OFFICE VISIT (OUTPATIENT)
Dept: PSYCHIATRY | Facility: CLINIC | Age: 67
End: 2022-04-26
Payer: COMMERCIAL

## 2022-04-26 ENCOUNTER — OFFICE VISIT (OUTPATIENT)
Dept: PHYSICAL THERAPY | Facility: CLINIC | Age: 67
End: 2022-04-26
Payer: COMMERCIAL

## 2022-04-26 VITALS — WEIGHT: 171.2 LBS | BODY MASS INDEX: 28.52 KG/M2 | HEIGHT: 65 IN

## 2022-04-26 DIAGNOSIS — F41.1 GENERALIZED ANXIETY DISORDER: ICD-10-CM

## 2022-04-26 DIAGNOSIS — F51.05 INSOMNIA RELATED TO ANOTHER MENTAL DISORDER: ICD-10-CM

## 2022-04-26 DIAGNOSIS — F33.41 MAJOR DEPRESSIVE DISORDER, RECURRENT, IN PARTIAL REMISSION (HCC): Primary | ICD-10-CM

## 2022-04-26 DIAGNOSIS — M54.2 CERVICALGIA: Primary | ICD-10-CM

## 2022-04-26 PROCEDURE — 97110 THERAPEUTIC EXERCISES: CPT

## 2022-04-26 PROCEDURE — 3008F BODY MASS INDEX DOCD: CPT | Performed by: FAMILY MEDICINE

## 2022-04-26 PROCEDURE — 99214 OFFICE O/P EST MOD 30 MIN: CPT | Performed by: NURSE PRACTITIONER

## 2022-04-26 PROCEDURE — 90833 PSYTX W PT W E/M 30 MIN: CPT | Performed by: NURSE PRACTITIONER

## 2022-04-26 PROCEDURE — 97530 THERAPEUTIC ACTIVITIES: CPT

## 2022-04-26 NOTE — PSYCH
This note was not shared with the patient due to privacy exception: note includes other individuals    [facility]      Name and Date of Birth:  Dean Blackwood 73 y o  1955    Date of Visit: 04/26/22      Dean Blackwood was seen today for medication management and brief psychotherapy for depression, anxiety, insomnia      Office Regular Visit    Throughout this appointment, COVID-19 precautions were followed at all times: masks were worn by this patient and the provider, social distancing was maintained, hand-washing was performed before and after appointment, and the provider's room was ventilated before this patient entered the room and after this patient left it  Time spent on psychotherapy: 20 minutes    Treatment modality:   Medication management   Medication education  Supportive psychotherapy  Encouraged and reinforced adaptive behavior        SUBJECTIVE: taking duloxetine and quetiapine as prescribed, denies side effects  Doing excellent, feels she is more control of her moods they don't control her  Feeling better about herself, thinks she has made very good progress in psychotherapy  Not saying she is sorry for the way that she feels, also not running off to her mother when she calls  Sleeps 9 pm to 5-5:30 am without trazodone, so she hasn't been taking it  Appetite on -  tries to watch what she eats and can lose a pound but not two, has two desert cookies after dinner, the serving size is four cookies and she figures that is ok  Better physically, still taking antibiotics for bronchitis  Just finished PT today  Elysia Roy sleeping well using a CPAP and is not as grouchy, he hasn't always understood her psychiatric hospitalizations  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, gardening  Explained effect of sugar and carbohydrates on blood sugar and weight     1/18/2022: doing well, not anxious, not depressed at all   She is finding psychotherapy to be very beneficial, working a lot, 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, loves teaching, 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, it's all giving her purpose, needs to feel valued and appreciated, when she isn't she retreats into negative thought patterns  Worked four days per week last two weeks  Hasn't talked to Galindo Chen about something she and her psychotherapist talked about, but is going to          She denies suicidal ideation, intent or plan at present, has no suicidal ideation, intent or plan at present  She denies any auditory hallucinations and visual hallucinations, denies any other delusional thinking, denies any delusional thinking  She denies any side effects from medications      HPI ROS Appetite Changes and Sleep:   Appetite - normal    Sleep - normal    Review Of Systems:      Constitutional Negative   ENT Negative   Cardiovascular Negative   Respiratory Negative   Gastrointestinal Negative   Genitourinary Negative   Musculoskeletal Negative   Integumentary Negative   Neurological Negative   Endocrine Negative   Other Symptoms Negative and None       Laboratory Results: No results found for this or any previous visit      Substance Abuse History:    Social History     Substance and Sexual Activity   Drug Use No       Family Psychiatric History:     Family History   Problem Relation Age of Onset    Heart disease Mother     Stroke Mother 58    COPD Mother     Arthritis Mother     Hypertension Father     Kidney disease Brother         kidney transplant    Multiple sclerosis Sister     No Known Problems Maternal Aunt     No Known Problems Maternal Uncle     No Known Problems Paternal Aunt     No Known Problems Paternal Uncle     No Known Problems Maternal Grandmother     No Known Problems Maternal Grandfather     No Known Problems Paternal Grandmother     No Known Problems Paternal Grandfather     Dislocations Sister     Neurological problems Sister     Scoliosis Sister     ADD / ADHD Neg Hx     Anesthesia problems Neg Hx     Cancer Neg Hx     Clotting disorder Neg Hx     Collagen disease Neg Hx     Diabetes Neg Hx     Dislocations Neg Hx     Learning disabilities Neg Hx     Neurological problems Neg Hx     Osteoporosis Neg Hx     Rheumatologic disease Neg Hx     Scoliosis Neg Hx     Vascular Disease Neg Hx        The following portions of the patient's history were reviewed and updated as appropriate: past family history, past medical history, past social history, past surgical history and problem list     Social History     Socioeconomic History    Marital status: /Civil Union     Spouse name: Not on file    Number of children: Not on file    Years of education: Not on file    Highest education level: Not on file   Occupational History    Not on file   Tobacco Use    Smoking status: Never Smoker    Smokeless tobacco: Never Used   Vaping Use    Vaping Use: Never used   Substance and Sexual Activity    Alcohol use: No    Drug use: No    Sexual activity: Not Currently   Other Topics Concern    Not on file   Social History Narrative    Not on file     Social Determinants of Health     Financial Resource Strain: Not on file   Food Insecurity: Not on file   Transportation Needs: Not on file   Physical Activity: Not on file   Stress: Not on file   Social Connections: Not on file   Intimate Partner Violence: Not on file   Housing Stability: Not on file     Social History     Social History Narrative    Not on file        Social History     Tobacco History     Smoking Status  Never Smoker    Smokeless Tobacco Use  Never Used          Alcohol History     Alcohol Use Status  No          Drug Use     Drug Use Status  No          Sexual Activity     Sexually Active  Not Currently          Activities of Daily Living    Not Asked                     OBJECTIVE:     Mental Status Evaluation:    Appearance age appropriate, casually dressed   Behavior pleasant, cooperative   Speech normal rate, rhythm, volume   Mood good   Affect Mood-congruent, full   Thought Processes Coherent, organized, goal-directed   Associations intact associations   Thought Content normal   Perceptual Disturbances: none   Abnormal Thoughts  Risk Potential Suicidal ideation - None  Homicidal ideation - None  Potential for aggression - No   Orientation oriented to person, place, date and situation   Memory recent and remote memory grossly intact   Consciousness alert and awake   Attention Span attention span and concentration are age appropriate   Intellect Not formally assessed   Insight intact   Judgement intact    Muscle Strength and  Gait muscle strength and tone were normal, gait normal   Language no difficulty naming common objects   Fund of Knowledge displays adequate knowledge of current events               The patient's chart was reviewed for relevant lab reports and recent encounters with other providers  Medications, treatment progress, and current treatment plan that was enacted on 11/17/2021 and due for review/update on 5/17/2022 were reviewed with the patient  The treatment plan was updated today with the patient who verbally agreed with the updated plan  Today's treatment plan is due for review/update NLT 10/26/2022  Pt and writer were unable to sign plan because signature pad is not working      Treatment plan goals discussed in this encounter: continue to improve control of depression, ultimate goal no hospitalization        Assessment/Plan:       Diagnoses and all orders for this visit:    Major depressive disorder, recurrent, in partial remission ([facility] [address] )    Generalized anxiety disorder    Insomnia related to another mental disorder          Treatment Recommendations/Precautions:    Continue current medications/doses:                duloxetine 60 mg capsule - take 1 capsule by mouth every day               duloxetine 30 mg capsule - take 1 capsule by mouth every day               Note: total daily dose of duloxetine  = 90 mg              quetiapine 100 mg tablet - take 1 tablet by mouth daily at bedtime    Discontinue: trazodone 50 mg - take 1 tab po HS (pt not taking)     Continue to see Jennifer Howell LCSW ([facility]) for psychotherapy       Risks/Benefits       Risks, Benefits And Possible Side Effects Of Medications:     Risks, benefits, and possible side effects of medications explained to patient and patient verbalizes understanding and agreement for treatment      Controlled Medication Discussion:      Not applicable

## 2022-04-26 NOTE — BH TREATMENT PLAN
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        Strengths/Personal Resources for Self-Care: financial security, ability to express needs, motivation for treatment     Area/Areas of need (in own words): depression, gambling addiction, emotional insecurity  1          Long Term Goal: continue to improve control of depression, ultimate goal no hospitalization  Target date: 10/26/2022  Person/Persons responsible for completion of goal: sarah Buckley     2          Short Term Objective (s) - How will we reach this goal?:   A  Provider new recommended medication/dosage changes and/or continue medication(s): Seroquel, Cymbalta  B  take medications as prescribed, attend scheduled appointments  C  Continue to see Daja Oakley LCSW ([address] [place] [address]) for psychotherapy  Target date: 10/26/2022   Person/Persons Responsible for Completion of Goal: sarah Buckley      Progress Towards Goals: progressing     Treatment Modality: medication management every 12 weeks     Review due 120 - 180 days from date of this plan: 10/26/2022  Expected length of service: ongoing treatment  My Physician/PA/NP and I have developed this plan together and I agree to work on the goals and objectives  I understand the treatment goals that were developed for my treatment

## 2022-04-26 NOTE — PATIENT INSTRUCTIONS
Continue current medications/doses:                duloxetine 60 mg capsule - take 1 capsule by mouth every day                duloxetine 30 mg capsule - take 1 capsule by mouth every day               Note: total daily dose of duloxetine  = 90 mg              quetiapine 100 mg tablet - take 1 tablet by mouth daily at bedtime

## 2022-04-26 NOTE — PROGRESS NOTES
Daily Note/Progress Note      Today's date: 2022  Patient name: Angel Dang  : 1955  MRN: [id number]  Referring provider: Jaquelin Lord MD  Dx:   Encounter Diagnosis     ICD-10-CM    1  Cervicalgia  M54 2        Subjective: Angel Dang reports overall she is feeling really well, is now able to weed and feels she continue independently  Pt reports % improvement since SOC: 80%  The last 20% is due to she wishes she had a little more range with turning head while driving  Pain level at rest:  0 /10, pain level with ADLS: 0-3 /58 depending on actvity pain level at worst: 6/10 which is much improved form IE  At this time, the functional limitations include: difficulty turning to look over right shoulder      Objective: See treatment diary below    Goals  STG: (22)  + Patient will have pain level 0/10 cervical spine,   at rest (2-3 weeks) met  + Patient will report a 30% improvement in symptoms cervical spine (2-3 weeks)  Met   + Patient will be independent in basic HEP (2-3 weeks) met   + Patient will demonstrate an increase in range of motion  cervical spine rotation to min LOm luis enrique  (2-3 weeks) not met luis enrique   + Patient will tolerance 35 min there ex program without an inc in pain (2-3 weeks) met     LTG:   adltg+ Patient will have pain level 2/10 cervical spine  with ADL's (4-6 weeks)not met consistently   + Patient will report a 50% improvement in symptoms (4-6 weeks) met   + Patient will increase FOTO score by 10 points (10 sessions) met   + Patient will be independent in comprehensive HEP (4-6 weeks) met  + Patient will demonstrate increase  MMT of  luis enrique UE and LE to  4/5 for maximum function   (4-6 weeks) ( not met right flexion)  + Patient will demonstrate functional reaching behind back on right wnl (4-6 weeks) met     Objective (22)  Comments  Posture  Sitting: forward head, rounded shoulders ( improved posture awareness)  Standing: slouched posture    Cervical ROM  Active (sitting)  Protraction: mod LOm + stretch ( wnl)  Retraction: major LOm  Status quo)  Flexion: wnl  + strain/stretch   Extension: major LOM "its hard" (grossly WNL)   Rotation: Right:  Major LOM  + tight  Left mod  LOM+ tight (Right: min LOM, left mod LOM)  Side bend: Right:  Major LOM  + strain Left major LOM  + strain (mod LOm luis enrique)    Functional reaching:  Overhead: reduced by 75% ( wnl luis enrique)  Behind head: able to achieve position but with difficulty ( wnl luis enrique)  Behind back: limited on right by 20% left wnl + pain on right and muscle "cramping" (wnl luis enrique)    MMT Upper Extremity  Flexion: Right: 3+/5 Left 3+/5 (left 4/5 , Right: 4-/5)  Abduction(C5): Right: 3+/5    Left 3+/5  (left 4/5, Right: 4/5 )  Elbow flexion (C6): Right: 4/5 Left 4/5    Elbow extension (C7): Right: 4/5  Left 4/5   Wrist flexion: Right: 3+/5     Left 3+/5 (left 4/5, Right: 4/5)  Wrist Extension: Right: 3+/5  Left 3+/5 (left 4/5, Right: 4/5)  : Right: fair   Left: fair  (left wnl, Right: fair +)  Digit abduction (C8): Right: 4/5 left: 4/5  After strength testing right hamstring moved to left and right thigh started tremoring  Hip flexors: luis enrique 4-/5 (4/5 luis enrique)  Knee flexion: 4/5   Knee extension: 4/5  Ankle: DF right 4-/5, left: 4/5 (4/5 luis enrique)  Ankle PF: Right: + produced cramp 4/5 , left 4/5 ( 4/5 luis enrique)    Dermatomes: grossly wnl to light touch luis enrique UE and LE   gait: no assistive device, reduced push off, reduced trunk stability with no LOM, reduced trunk rotation  ( improvement noted with all eviaitons except reduced trunk rotation still present)    Shoulder ROM: prn        Assessment: Ana Rosa Knight demonstrates imprvoement in strength, function tolerance and reduced pain levels  Patient describes returning to prior onset of pain activities and feels she would like to continue independently with HEP  Patient still has ROM restriction with rotation left and weakness right shoulder elevation       The goal status of Arthlenny Knight is indicated above   Patient advised to return to PT if indicated  Patient was issued updated Hep in written form and appropriate band if needed  Patient demonstrated independence with these exercises       Plan: d/c to HEP as per aptient request          Eval/ Re-eval Auth #/ Referral # Total visits Start date  Expiration date Total active units  Total manual units  PT only or  PT+OT?    3/17/22 No auth needed, no visit restriction                 Precautions depression, anxiety,     Specialty Daily Treatment Diary     Insurance:     foto performed     Visits: 6 7 3 4 5   Manual: 4/21/22 4/26/22 3/24/22 3/29/22 4/7/22           STM/SOR IM  -- IM  - IM    UT stretch IM  -- IM  - IM                            Protocol:        Therapeutic Activity:        Reevaluation  Performed       Posture instruction   Educated pt              Therapeutic Exercise:        Nustep:  UE/LE lvl 1/30 miles 6 34  --  UE/LE lv 1  28 miles 7:07 UE/LE lv 1  30 miles 6 34   AROM cervical rotation 3s x 10 B   X 10 supine w/ 3s hold B  3 sec x 10 luis enrique     Self UT stretch 5s x 10   X 10 5s  5 sec x 10     Scap retraction with tband ER Loop green 2 x 10  Loop: green 2 x 10   2 x 10 RTB B  Loop: green 2 x 10   Green loop 2 x 10    Tubing rows RTB x 20  Red 2 x 10   RTB x 20 5s hold cues required Red: 2 x 10   Red: 2 x 10    scap isometrics   X 20 5s      scaption  2 x 10    2 x 10   2 x 10    pec stretch  X 10  5 sec x 10   X 10  5 sec x 10   5s x 10    Standing hip flexion X 10  10 x 2       Sit/stand to mat table         SLR 5s x 10       SLR: 10x AROM 2 x 10   Quad sets x 10 3s hold     AASLR 2 x 5  5 sec x 10 each      SLR: 10 x AROM 5s x 10 ea/       SLR: 10x AROM    sidestepping 16 ft x 2 x 3  16 feet x 4 X 4 15 ft  16 feet x 2 x 3 rounds  16 fet x 2 x 3    Step ups 2 x 10  6 in 2 x 10    2 x 10   2 x 10                    Neuro Neftali:        Stab cuff: cspine retraction                Gait Training:                 HEP:                Modalities    deferred     Ice                              Access Code: [email or web address]  URL: [email or web address]  Date: 03/29/2022  Prepared by: Ermalinda Balding    Exercises  · Shoulder External Rotation and Scapular Retraction with Resistance - 1 x daily - 7 x weekly - 2 sets - 10 reps  · Standing Shoulder Row with Anchored Resistance - 1 x daily - 7 x weekly - 2 sets - 10 reps  · Shoulder Extension with Resistance - 1 x daily - 7 x weekly - 2 sets - 10 reps  · Seated Scapular Retraction - 1 x daily - 7 x weekly - 1 sets - 10 reps - 5 sec hold  · Doorway Pec Stretch at 60 Elevation - 1 x daily - 7 x weekly - 1 sets - 10 reps - 5 sec hold  · Standing Shoulder Scaption - 1 x daily - 7 x weekly - 3 sets - 10 reps      Access Code: [email or web address]  URL: [email or web address]  Date: 04/26/2022  Prepared by:  Ermalinda Balding    Exercises  · Supine Active Straight Leg Raise - 1 x daily - 7 x weekly - 2 sets - 10 reps  · Sidestepping - 1 x daily - 7 x weekly - 4 sets - 6 reps  · Forward Step Up with Counter Support - 1 x daily - 7 x weekly - 2 sets - 10 reps

## 2022-04-26 NOTE — PROGRESS NOTES
Daily Note         Today's date: 2022  Patient name: Magalie Briscoe  : 1955  MRN: [id number]  Referring provider: Shawn Mejia MD  Dx:   Encounter Diagnosis     ICD-10-CM    1  Cervicalgia  M54 2        Subjective: Magalie Briscoe reports overall she is feeling really well, is now able to weed       Objective: See treatment diary below    Assessment:  {ASSESSMENT:94171}  The goal of the current treatment is to address patient's functional limitations as well as objective findings  Plan: {WKRJ:25970}        Eval/ Re-eval Auth #/ Referral # Total visits Start date  Expiration date Total active units  Total manual units  PT only or  PT+OT?    3/17/22 No auth needed, no visit restriction                       Precautions depression, anxiety,     Specialty Daily Treatment Diary     Insurance:     foto performed     Visits: 6 2 3 4 5   Manual: 4/21/22 3/22/22 3/24/22 3/29/22 4/7/22           STM/SOR IM  IM  IM  - IM    UT stretch IM  IM  IM  - IM                            Protocol:        Therapeutic Activity:        Reevaluation        Posture instruction  Educated pt  Educated pt              Therapeutic Exercise:        Nustep:  UE/LE lvl  miles 6 34    UE/LE lv 1  28 miles 7:07 UE/LE lv 1  30 miles 6 34   AROM cervical rotation 3s x 10 B  X 10  X 10 supine w/ 3s hold B  3 sec x 10 luis enrique     Self UT stretch 5s x 10  X 10 5s hold B  X 10 5s  5 sec x 10     Scap retraction with tband ER Loop green 2 x 10  2 x 10 YTB B  2 x 10 RTB B  Loop: green 2 x 10   Green loop 2 x 10    Tubing rows RTB x 20  RTB x 20  RTB x 20 5s hold cues required Red: 2 x 10   Red: 2 x 10    scap isometrics  X 20 5s  X 20 5s      scaption    2 x 10   2 x 10    pec stretch  X 10  X 10  X 10  5 sec x 10   5s x 10    Standing hip flexion X 10        Sit/stand to mat table         SLR 5s x 10       SLR: 10x AROM X 5 B Quad sets x 10 3s hold     AASLR 2 x 5  5 sec x 10 each      SLR: 10 x AROM 5s x 10 ea/       SLR: 10x AROM    sidestepping 16 ft x 2 x 3  X 5 15 ft  X 4 15 ft  16 feet x 2 x 3 rounds  16 fet x 2 x 3    Step ups 2 x 10    2 x 10   2 x 10                    Neuro Neftali:        Stab cuff: cspine retraction                Gait Training:                 HEP:                Modalities        MH  Post- tx on Cx spine deferred     Ice                  Access Code: [email or web address]  URL: [email or web address]  Date: 03/29/2022  Prepared by:  Ildefonso Mclean    Exercises  · Shoulder External Rotation and Scapular Retraction with Resistance - 1 x daily - 7 x weekly - 2 sets - 10 reps  · Standing Shoulder Row with Anchored Resistance - 1 x daily - 7 x weekly - 2 sets - 10 reps  · Shoulder Extension with Resistance - 1 x daily - 7 x weekly - 2 sets - 10 reps  · Seated Scapular Retraction - 1 x daily - 7 x weekly - 1 sets - 10 reps - 5 sec hold  · Doorway Pec Stretch at 60 Elevation - 1 x daily - 7 x weekly - 1 sets - 10 reps - 5 sec hold  · Standing Shoulder Scaption - 1 x daily - 7 x weekly - 3 sets - 10 reps

## 2022-04-28 ENCOUNTER — APPOINTMENT (OUTPATIENT)
Dept: PHYSICAL THERAPY | Facility: CLINIC | Age: 67
End: 2022-04-28
Payer: COMMERCIAL

## 2022-05-02 ENCOUNTER — HOSPITAL ENCOUNTER (OUTPATIENT)
Dept: RADIOLOGY | Facility: HOSPITAL | Age: 67
Discharge: HOME | End: 2022-05-02
Payer: COMMERCIAL

## 2022-05-02 DIAGNOSIS — M47.812 SPONDYLOSIS WITHOUT MYELOPATHY OR RADICULOPATHY, CERVICAL REGION: ICD-10-CM

## 2022-05-02 DIAGNOSIS — M54.12 RADICULOPATHY, CERVICAL REGION: ICD-10-CM

## 2022-05-02 DIAGNOSIS — M50.90 CERVICAL DISC DISORDER, UNSPECIFIED, UNSPECIFIED CERVICAL REGION: ICD-10-CM

## 2022-05-02 PROCEDURE — G1004 CDSM NDSC: HCPCS

## 2022-05-02 PROCEDURE — 72125 CT NECK SPINE W/O DYE: CPT

## 2022-05-17 ENCOUNTER — TELEPHONE (OUTPATIENT)
Dept: PSYCHIATRY | Facility: CLINIC | Age: 67
End: 2022-05-17

## 2022-05-17 ENCOUNTER — HOSPITAL ENCOUNTER (OUTPATIENT)
Dept: RADIOLOGY | Facility: HOSPITAL | Age: 67
Discharge: HOME | End: 2022-05-17
Payer: COMMERCIAL

## 2022-05-17 DIAGNOSIS — M47.812 SPONDYLOSIS WITHOUT MYELOPATHY OR RADICULOPATHY, CERVICAL REGION: ICD-10-CM

## 2022-05-17 DIAGNOSIS — M54.12 RADICULOPATHY, CERVICAL REGION: ICD-10-CM

## 2022-05-17 DIAGNOSIS — M50.90 CERVICAL DISC DISORDER, UNSPECIFIED, UNSPECIFIED CERVICAL REGION: ICD-10-CM

## 2022-05-17 PROCEDURE — G1004 CDSM NDSC: HCPCS

## 2022-05-17 PROCEDURE — 72141 MRI NECK SPINE W/O DYE: CPT

## 2022-05-17 NOTE — TELEPHONE ENCOUNTER
Patient called to cancel her appointment scheduled today with Willie Gaines at 2:30 pm   She is very sick and wouldn't even be able to do a virtual due to her voice being almost gone

## 2022-05-17 NOTE — PROGRESS NOTES
Assessment/Plan:     Discussed with the patient her condition, and the etiology of her cramps possibly due to fibromyalgia or psychological medication, on exam there is no abnormalities or defect to the calf muscles, also another reason for cramps can be spinal stenosis, patient to start on oral supplement, discussed with the patient following up with her neurologist for further evaluation  Patient bilateral foot were digitally scanned in a simulated semi weight-bearing position to fabricate 1 pair of custom foot orthosis deep heel cup metatarsal pad and dressed ray cutout to accommodate for patient bunion deformity and hammertoes  Diagnoses and all orders for this visit:    Neuropathy  -     L-Methylfolate-Algae-B12-B6 (Metanx) 3-90 314-2-35 MG CAPS; Take 1 tablet by mouth 2 (two) times a day    Acquired deformity of both feet    Valgus deformity of both great toes    Hammertoe, bilateral    Leg cramps          Subjective:      Patient ID: Desi Cha is a 73 y o  female  62-year-old female past medical history significant of multiple psychological concern including depression, also fibromyalgia and spinal stenosis causing neuropathy, patient report to the office for painful bunion and hammertoes bilateral lower extremity left more than right, patient also report trims to bilateral leg occasionally that causing pain while resting, patient denies constitutional symptoms, patient denies recent trauma to the      The following portions of the patient's history were reviewed and updated as appropriate: allergies, current medications, past family history, past medical history, past social history, past surgical history and problem list     Review of Systems   Constitutional: Negative  Respiratory: Negative  Cardiovascular: Negative  Musculoskeletal: Negative  Skin: Positive for color change  Neurological: Positive for weakness and numbness                 Historical Information   Past Medical History:   Diagnosis Date    Abdominal adhesions     Anesthesia complication     difficult to wake up    Anxiety     Depression     Depression     Disease of thyroid gland     Fibromyalgia     Fractures 2006    GERD (gastroesophageal reflux disease)     History of cholecystectomy 9/7/2019    Lazy eye     resolved: 3/27/17    Medical clearance for psychiatric admission 7/13/2021    Melanoma ([address] ) 2010    Osteoarthritis 7/2018    Osteopenia     with joint pain-elevated DEV    Psychiatric disorder     Stomach disorder 1995    Tinnitus     Wears glasses     for reading     Past Surgical History:   Procedure Laterality Date    ABDOMINAL SURGERY      lysis of adhesions x 2    APPENDECTOMY      CERVICAL FUSION      May 5,2021 and Jan 01,2020    CHOLECYSTECTOMY      open    DILATION AND CURETTAGE OF UTERUS      FOOT SURGERY  5/2017    NECK SURGERY  1/2019 5/2021    NEUROMA EXCISION Right 5/26/2017    Procedure: EXCISION MASS / FIBROMA FOOT;  Surgeon: Fariba Deleon DPM;  Location: [address] [place] [address];  Service:    Shi Bailey PLANA VITRECTOMY W/ REPAIR OF MACULAR HOLE  12/23/2020    OH XCAPSL CTRC RMVL INSJ IO LENS PROSTH W/O ECP Left 12/6/2021    Procedure: EXTRACTION EXTRACAPSULAR CATARACT PHACO INTRAOCULAR LENS (IOL);   Surgeon: Reece Stevenson MD;  [facility] MAIN OR;  Service: Ophthalmology    RIGHT OOPHORECTOMY      WISDOM TOOTH EXTRACTION      x4     Social History   Social History     Substance and Sexual Activity   Alcohol Use No     Social History     Substance and Sexual Activity   Drug Use No     Social History     Tobacco Use   Smoking Status Never Smoker   Smokeless Tobacco Never Used     Family History:   Family History   Problem Relation Age of Onset    Heart disease Mother     Stroke Mother 58    COPD Mother     Arthritis Mother     Hypertension Father     Kidney disease Brother         kidney transplant    Multiple sclerosis Sister     No Known Problems Maternal Aunt     No Known Problems Maternal Uncle     No Known Problems Paternal Aunt     No Known Problems Paternal Uncle     No Known Problems Maternal Grandmother     No Known Problems Maternal Grandfather     No Known Problems Paternal Grandmother     No Known Problems Paternal Grandfather     Dislocations Sister     Neurological problems Sister     Scoliosis Sister     ADD / ADHD Neg Hx     Anesthesia problems Neg Hx     Cancer Neg Hx     Clotting disorder Neg Hx     Collagen disease Neg Hx     Diabetes Neg Hx     Dislocations Neg Hx     Learning disabilities Neg Hx     Neurological problems Neg Hx     Osteoporosis Neg Hx     Rheumatologic disease Neg Hx     Scoliosis Neg Hx     Vascular Disease Neg Hx        Meds/Allergies   all medications and allergies reviewed  Allergies   Allergen Reactions    Demerol [Meperidine] Lightheadedness    Sulfa Antibiotics Hives       Objective:      /76   Pulse 90   Resp 18   Ht 5' 5" (1 651 m)   Wt 76 2 kg (168 lb)   LMP  (LMP Unknown)   BMI 27 96 kg/m²          Physical Exam  Constitutional:       General: She is not in acute distress  Appearance: She is well-developed  She is not ill-appearing, toxic-appearing or diaphoretic  HENT:      Head: Normocephalic and atraumatic  Cardiovascular:      Pulses: Normal pulses  Dorsalis pedis pulses are 2+ on the right side and 2+ on the left side  Posterior tibial pulses are 2+ on the right side and 2+ on the left side  Comments: Palpable pedal pulse, CFT is less than 3 seconds, temperature gradient within normal limits, pedal hair present, no trophic skin changes  Pulmonary:      Effort: No respiratory distress  Musculoskeletal:         General: Normal range of motion        Comments: HAV deformity bilateral lower extremity with pain on palpation to the dorsal medial exostosis on the left 1st MPJ, also flexible hammertoes bilateral lower extremity that increases upon weight-bearing, hypermobile 1st ray bilateral, callus formation noted plantar to the mass lesser metatarsal head bilateral, ankle equinus bilateral with limited dorsiflexion at the ankle gastroc equinus in origin   Feet:      Right foot:      Protective Sensation: 10 sites tested  10 sites sensed  Skin integrity: No ulcer, blister, skin breakdown or erythema  Left foot:      Protective Sensation: 10 sites tested  10 sites sensed  Skin integrity: No ulcer, blister, skin breakdown or erythema  Skin:     Capillary Refill: Capillary refill takes 2 to 3 seconds  Coloration: Skin is not pale  Neurological:      Mental Status: She is alert and oriented to person, place, and time  Deep Tendon Reflexes: Reflexes abnormal       Comments:  muscle power 5/5, protective sensations intact, no focal motor deficit  Foot Exam    Right Foot/Ankle     Inspection and Palpation  Skin Exam: no blister, no maceration, no ulcer and no erythema     Neurovascular  Dorsalis pedis: 2+  Posterior tibial: 2+      Left Foot/Ankle      Inspection and Palpation  Skin Exam: no blister, no maceration, no ulcer and no erythema     Neurovascular  Dorsalis pedis: 2+  Posterior tibial: 2+              Portions of the record may have been created with voice recognition software  Occasional wrong word or "sound a like" substitutions may have occurred due to the inherent limitations of voice recognition software  Read the chart carefully and recognize, using context, where substitutions have occurred

## 2022-05-18 ENCOUNTER — OFFICE VISIT (OUTPATIENT)
Dept: FAMILY MEDICINE CLINIC | Facility: CLINIC | Age: 67
End: 2022-05-18
Payer: COMMERCIAL

## 2022-05-18 VITALS
SYSTOLIC BLOOD PRESSURE: 130 MMHG | DIASTOLIC BLOOD PRESSURE: 80 MMHG | BODY MASS INDEX: 28.66 KG/M2 | WEIGHT: 172 LBS | HEIGHT: 65 IN | HEART RATE: 82 BPM | RESPIRATION RATE: 20 BRPM | TEMPERATURE: 99.1 F

## 2022-05-18 DIAGNOSIS — R68.89 FLU-LIKE SYMPTOMS: Primary | ICD-10-CM

## 2022-05-18 LAB
SARS-COV-2 AG UPPER RESP QL IA: NEGATIVE
VALID CONTROL: NORMAL

## 2022-05-18 PROCEDURE — 99213 OFFICE O/P EST LOW 20 MIN: CPT | Performed by: INTERNAL MEDICINE

## 2022-05-18 PROCEDURE — 3008F BODY MASS INDEX DOCD: CPT | Performed by: INTERNAL MEDICINE

## 2022-05-18 PROCEDURE — 1160F RVW MEDS BY RX/DR IN RCRD: CPT | Performed by: INTERNAL MEDICINE

## 2022-05-18 PROCEDURE — 87811 SARS-COV-2 COVID19 W/OPTIC: CPT | Performed by: INTERNAL MEDICINE

## 2022-05-18 PROCEDURE — 1036F TOBACCO NON-USER: CPT | Performed by: INTERNAL MEDICINE

## 2022-05-18 RX ORDER — OSELTAMIVIR PHOSPHATE 75 MG/1
75 CAPSULE ORAL EVERY 12 HOURS SCHEDULED
Qty: 10 CAPSULE | Refills: 0 | Status: SHIPPED | OUTPATIENT
Start: 2022-05-18 | End: 2022-05-23

## 2022-05-18 RX ORDER — CEFADROXIL 500 MG/1
500 CAPSULE ORAL EVERY 12 HOURS
COMMUNITY
Start: 2022-05-10 | End: 2022-06-27

## 2022-05-18 RX ORDER — BACLOFEN 20 MG/1
TABLET ORAL
COMMUNITY
Start: 2022-04-29 | End: 2022-05-18

## 2022-05-18 RX ORDER — DEXTROMETHORPHAN HYDROBROMIDE AND PROMETHAZINE HYDROCHLORIDE 15; 6.25 MG/5ML; MG/5ML
5 SOLUTION ORAL 4 TIMES DAILY PRN
Qty: 180 ML | Refills: 0 | Status: SHIPPED | OUTPATIENT
Start: 2022-05-18 | End: 2022-07-26 | Stop reason: ALTCHOICE

## 2022-05-18 NOTE — PROGRESS NOTES
Assessment/Plan:    1  Flu-like symptoms  Comments:  COVID negative, will treat presumptively for flu given symptoms  Advised on supportive measures  F/u if not improving  Orders:  -     oseltamivir (TAMIFLU) 75 mg capsule; Take 1 capsule (75 mg total) by mouth every 12 (twelve) hours for 5 days  -     Promethazine-DM (PHENERGAN-DM) 6 25-15 mg/5 mL oral syrup; Take 5 mL by mouth as needed in the morning and 5 mL as needed at noon and 5 mL as needed in the evening and 5 mL as needed before bedtime for cough  -     Poct Covid 19 Rapid Antigen Test          There are no Patient Instructions on file for this visit  No follow-ups on file  Subjective:      Patient ID: Val Glover is a 73 y o  female  Chief Complaint   Patient presents with    Laryngitis     Ac/lpn    Cough    Generalized Body Aches    Shortness of Breath       Started yesterday with hoarseness, then cough and sore throat  Has body aches, low grade fever  Has mild dyspnea, no appetite, no wheeze  The following portions of the patient's history were reviewed and updated as appropriate: allergies, current medications, past family history, past medical history, past social history, past surgical history and problem list     Review of Systems   Constitutional: Positive for fatigue and fever  HENT: Positive for congestion, rhinorrhea and sore throat  Respiratory: Positive for cough            Current Outpatient Medications   Medication Sig Dispense Refill    albuterol (PROVENTIL HFA,VENTOLIN HFA) 90 mcg/act inhaler Inhale 2 puffs every 4 (four) hours as needed for wheezing 1 Inhaler 0    ALPRAZolam (XANAX) 0 5 mg tablet Take 0 5 mg by mouth 2 (two) times a day as needed        atorvastatin (LIPITOR) 20 mg tablet Take 1 tablet (20 mg total) by mouth daily 90 tablet 3    calcium carbonate (OS-WILLY) 1250 (500 Ca) MG tablet Take 1,250 mg by mouth      cefadroxil (DURICEF) 500 mg capsule Take 500 mg by mouth every 12 (twelve) hours  DULoxetine (CYMBALTA) 30 mg delayed release capsule Take 1 capsule (30 mg total) by mouth daily 90 capsule 0    DULoxetine (CYMBALTA) 60 mg delayed release capsule Take 1 capsule (60 mg total) by mouth daily 90 capsule 0    gabapentin (NEURONTIN) 300 mg capsule Take 2 capsules (600 mg total) by mouth 3 (three) times a day 180 capsule 5    levothyroxine 50 mcg tablet TAKE 1 TABLET BY MOUTH EVERY DAY 90 tablet 0    Magnesium 400 MG TABS Take by mouth daily      metoprolol succinate (TOPROL-XL) 25 mg 24 hr tablet       midodrine (PROAMATINE) 10 MG tablet Take 1 tablet (10 mg total) by mouth 3 (three) times a day 90 tablet 2    oseltamivir (TAMIFLU) 75 mg capsule Take 1 capsule (75 mg total) by mouth every 12 (twelve) hours for 5 days 10 capsule 0    pantoprazole (PROTONIX) 40 mg tablet TAKE 1 TABLET BY MOUTH EVERY DAY 90 tablet 1    Promethazine-DM (PHENERGAN-DM) 6 25-15 mg/5 mL oral syrup Take 5 mL by mouth as needed in the morning and 5 mL as needed at noon and 5 mL as needed in the evening and 5 mL as needed before bedtime for cough  180 mL 0    QUEtiapine (SEROquel) 100 mg tablet Take 1 tablet (100 mg total) by mouth daily at bedtime 90 tablet 0    Sodium Fluoride 5000 Sensitive 1 1-5 % GEL       L-Methylfolate-Algae-B12-B6 (Metanx) 3-90 314-2-35 MG CAPS Take 1 tablet by mouth 2 (two) times a day 60 capsule 2     No current facility-administered medications for this visit  Objective:    /80   Pulse 82   Temp 99 1 °F (37 3 °C)   Resp 20   Ht 5' 5" (1 651 m)   Wt 78 kg (172 lb)   LMP  (LMP Unknown)   BMI 28 62 kg/m²        Physical Exam  HENT:      Right Ear: Tympanic membrane is retracted  Left Ear: Tympanic membrane is retracted  Nose: Mucosal edema and rhinorrhea present  Cardiovascular:      Rate and Rhythm: Normal rate and regular rhythm  Heart sounds: Normal heart sounds     Pulmonary:      Effort: Pulmonary effort is normal       Breath sounds: Normal breath sounds  No wheezing or rales  Musculoskeletal:      Cervical back: Neck supple  Lymphadenopathy:      Cervical: No cervical adenopathy                  Kendra Otero MD

## 2022-05-20 ENCOUNTER — TELEPHONE (OUTPATIENT)
Dept: GASTROENTEROLOGY | Facility: CLINIC | Age: 67
End: 2022-05-20

## 2022-05-20 NOTE — TELEPHONE ENCOUNTER
Spoke to pt confirming her colonoscopy scheduled on 5/27/22 at [facility] ER with Dr Sushant Mcmahon   I informed pt that [facility] would be calling her 1-2 days prior with her arrival time  I informed pt that she will need to do a clear liquid diet the day before as well as the bowel cleansing preparation  Advised that pt will need a  the day of the procedure due to being under sedation  Pt did not have any questions

## 2022-05-21 ENCOUNTER — APPOINTMENT (EMERGENCY)
Dept: RADIOLOGY | Facility: HOSPITAL | Age: 67
End: 2022-05-21
Payer: COMMERCIAL

## 2022-05-21 ENCOUNTER — HOSPITAL ENCOUNTER (EMERGENCY)
Facility: HOSPITAL | Age: 67
Discharge: HOME | End: 2022-05-21
Attending: EMERGENCY MEDICINE
Payer: COMMERCIAL

## 2022-05-21 VITALS
HEART RATE: 104 BPM | OXYGEN SATURATION: 97 % | RESPIRATION RATE: 20 BRPM | DIASTOLIC BLOOD PRESSURE: 78 MMHG | SYSTOLIC BLOOD PRESSURE: 137 MMHG | TEMPERATURE: 97.2 F

## 2022-05-21 DIAGNOSIS — R05.9 COUGH: Primary | ICD-10-CM

## 2022-05-21 DIAGNOSIS — J20.9 ACUTE BRONCHITIS: ICD-10-CM

## 2022-05-21 LAB
ALBUMIN SERPL BCP-MCNC: 3.2 G/DL (ref 3.5–5)
ALP SERPL-CCNC: 123 U/L (ref 46–116)
ALT SERPL W P-5'-P-CCNC: 60 U/L (ref 12–78)
ANION GAP SERPL CALCULATED.3IONS-SCNC: 7 MMOL/L (ref 4–13)
AST SERPL W P-5'-P-CCNC: 42 U/L (ref 5–45)
BASOPHILS # BLD AUTO: 0.04 THOUSANDS/ΜL (ref 0–0.1)
BASOPHILS NFR BLD AUTO: 0 % (ref 0–1)
BILIRUB SERPL-MCNC: 0.42 MG/DL (ref 0.2–1)
BUN SERPL-MCNC: 11 MG/DL (ref 5–25)
CALCIUM ALBUM COR SERPL-MCNC: 9.6 MG/DL (ref 8.3–10.1)
CALCIUM SERPL-MCNC: 9 MG/DL (ref 8.3–10.1)
CHLORIDE SERPL-SCNC: 101 MMOL/L (ref 100–108)
CO2 SERPL-SCNC: 26 MMOL/L (ref 21–32)
CREAT SERPL-MCNC: 0.93 MG/DL (ref 0.6–1.3)
EOSINOPHIL # BLD AUTO: 0.21 THOUSAND/ΜL (ref 0–0.61)
EOSINOPHIL NFR BLD AUTO: 2 % (ref 0–6)
ERYTHROCYTE [DISTWIDTH] IN BLOOD BY AUTOMATED COUNT: 13.3 % (ref 11.6–15.1)
FLUAV RNA RESP QL NAA+PROBE: NEGATIVE
FLUBV RNA RESP QL NAA+PROBE: NEGATIVE
GFR SERPL CREATININE-BSD FRML MDRD: 64 ML/MIN/1.73SQ M
GLUCOSE SERPL-MCNC: 121 MG/DL (ref 65–140)
HCT VFR BLD AUTO: 38.2 % (ref 34.8–46.1)
HGB BLD-MCNC: 12.2 G/DL (ref 11.5–15.4)
IMM GRANULOCYTES # BLD AUTO: 0.03 THOUSAND/UL (ref 0–0.2)
IMM GRANULOCYTES NFR BLD AUTO: 0 % (ref 0–2)
LYMPHOCYTES # BLD AUTO: 1.17 THOUSANDS/ΜL (ref 0.6–4.47)
LYMPHOCYTES NFR BLD AUTO: 12 % (ref 14–44)
MAGNESIUM SERPL-MCNC: 2.1 MG/DL (ref 1.6–2.6)
MCH RBC QN AUTO: 28.1 PG (ref 26.8–34.3)
MCHC RBC AUTO-ENTMCNC: 31.9 G/DL (ref 31.4–37.4)
MCV RBC AUTO: 88 FL (ref 82–98)
MONOCYTES # BLD AUTO: 0.87 THOUSAND/ΜL (ref 0.17–1.22)
MONOCYTES NFR BLD AUTO: 9 % (ref 4–12)
NEUTROPHILS # BLD AUTO: 7.08 THOUSANDS/ΜL (ref 1.85–7.62)
NEUTS SEG NFR BLD AUTO: 77 % (ref 43–75)
NRBC BLD AUTO-RTO: 0 /100 WBCS
PLATELET # BLD AUTO: 245 THOUSANDS/UL (ref 149–390)
PMV BLD AUTO: 10.5 FL (ref 8.9–12.7)
POTASSIUM SERPL-SCNC: 4.3 MMOL/L (ref 3.5–5.3)
PROT SERPL-MCNC: 7.6 G/DL (ref 6.4–8.2)
RBC # BLD AUTO: 4.34 MILLION/UL (ref 3.81–5.12)
RSV RNA RESP QL NAA+PROBE: NEGATIVE
SARS-COV-2 RNA RESP QL NAA+PROBE: NEGATIVE
SODIUM SERPL-SCNC: 134 MMOL/L (ref 136–145)
WBC # BLD AUTO: 9.4 THOUSAND/UL (ref 4.31–10.16)

## 2022-05-21 PROCEDURE — 36415 COLL VENOUS BLD VENIPUNCTURE: CPT | Performed by: EMERGENCY MEDICINE

## 2022-05-21 PROCEDURE — 0241U HB NFCT DS VIR RESP RNA 4 TRGT: CPT | Performed by: EMERGENCY MEDICINE

## 2022-05-21 PROCEDURE — 96374 THER/PROPH/DIAG INJ IV PUSH: CPT

## 2022-05-21 PROCEDURE — 94640 AIRWAY INHALATION TREATMENT: CPT

## 2022-05-21 PROCEDURE — 71045 X-RAY EXAM CHEST 1 VIEW: CPT

## 2022-05-21 PROCEDURE — 99285 EMERGENCY DEPT VISIT HI MDM: CPT

## 2022-05-21 PROCEDURE — 80053 COMPREHEN METABOLIC PANEL: CPT | Performed by: EMERGENCY MEDICINE

## 2022-05-21 PROCEDURE — 85025 COMPLETE CBC W/AUTO DIFF WBC: CPT | Performed by: EMERGENCY MEDICINE

## 2022-05-21 PROCEDURE — 96361 HYDRATE IV INFUSION ADD-ON: CPT

## 2022-05-21 PROCEDURE — 83735 ASSAY OF MAGNESIUM: CPT | Performed by: EMERGENCY MEDICINE

## 2022-05-21 PROCEDURE — 99284 EMERGENCY DEPT VISIT MOD MDM: CPT | Performed by: EMERGENCY MEDICINE

## 2022-05-21 RX ORDER — AZITHROMYCIN 250 MG/1
TABLET, FILM COATED ORAL
Qty: 6 TABLET | Refills: 0 | Status: SHIPPED | OUTPATIENT
Start: 2022-05-21 | End: 2022-05-25

## 2022-05-21 RX ORDER — IPRATROPIUM BROMIDE AND ALBUTEROL SULFATE 2.5; .5 MG/3ML; MG/3ML
3 SOLUTION RESPIRATORY (INHALATION) ONCE
Status: COMPLETED | OUTPATIENT
Start: 2022-05-21 | End: 2022-05-21

## 2022-05-21 RX ORDER — METHYLPREDNISOLONE SODIUM SUCCINATE 125 MG/2ML
80 INJECTION, POWDER, LYOPHILIZED, FOR SOLUTION INTRAMUSCULAR; INTRAVENOUS ONCE
Status: COMPLETED | OUTPATIENT
Start: 2022-05-21 | End: 2022-05-21

## 2022-05-21 RX ORDER — ALBUTEROL SULFATE 90 UG/1
2 AEROSOL, METERED RESPIRATORY (INHALATION) EVERY 6 HOURS PRN
Qty: 8.5 G | Refills: 0 | Status: SHIPPED | OUTPATIENT
Start: 2022-05-21

## 2022-05-21 RX ORDER — NAPROXEN 500 MG/1
500 TABLET ORAL 2 TIMES DAILY WITH MEALS
Qty: 10 TABLET | Refills: 0 | Status: SHIPPED | OUTPATIENT
Start: 2022-05-21 | End: 2022-06-27

## 2022-05-21 RX ORDER — BENZONATATE 100 MG/1
100 CAPSULE ORAL ONCE
Status: COMPLETED | OUTPATIENT
Start: 2022-05-21 | End: 2022-05-21

## 2022-05-21 RX ORDER — PREDNISONE 20 MG/1
60 TABLET ORAL DAILY
Qty: 15 TABLET | Refills: 0 | Status: SHIPPED | OUTPATIENT
Start: 2022-05-21 | End: 2022-05-26

## 2022-05-21 RX ADMIN — IPRATROPIUM BROMIDE AND ALBUTEROL SULFATE 3 ML: 2.5; .5 SOLUTION RESPIRATORY (INHALATION) at 10:36

## 2022-05-21 RX ADMIN — METHYLPREDNISOLONE SODIUM SUCCINATE 80 MG: 125 INJECTION, POWDER, FOR SOLUTION INTRAMUSCULAR; INTRAVENOUS at 10:36

## 2022-05-21 RX ADMIN — SODIUM CHLORIDE 1000 ML: 0.9 INJECTION, SOLUTION INTRAVENOUS at 10:28

## 2022-05-21 RX ADMIN — BENZONATATE 100 MG: 100 CAPSULE ORAL at 10:36

## 2022-05-21 NOTE — ED PROVIDER NOTES
History  Chief Complaint   Patient presents with    Shortness of Breath    Cough     Pt states she started with cough, sob, weakness Tuesday  Saw Dr  On Wednesday was prescribed tamiflu but feels worse now  71-year-old female presents with shortness of breath cough wheezing productive cough with green sputum myalgias for the past few days  Subjective fevers reported  Denies any nausea vomiting abdominal pain diarrhea or any other symptoms has been vaccinated against COVID  History provided by:  Patient   used: No        Prior to Admission Medications   Prescriptions Last Dose Informant Patient Reported? Taking? ALPRAZolam (XANAX) 0 5 mg tablet   Yes No   Sig: Take 0 5 mg by mouth 2 (two) times a day as needed     DULoxetine (CYMBALTA) 30 mg delayed release capsule   No No   Sig: Take 1 capsule (30 mg total) by mouth daily   DULoxetine (CYMBALTA) 60 mg delayed release capsule   No No   Sig: Take 1 capsule (60 mg total) by mouth daily   L-Methylfolate-Algae-B12-B6 (Metanx) 3-90 314-2-35 MG CAPS   No No   Sig: Take 1 tablet by mouth 2 (two) times a day   Magnesium 400 MG TABS   Yes No   Sig: Take by mouth daily   Promethazine-DM (PHENERGAN-DM) 6 25-15 mg/5 mL oral syrup   No No   Sig: Take 5 mL by mouth as needed in the morning and 5 mL as needed at noon and 5 mL as needed in the evening and 5 mL as needed before bedtime for cough     QUEtiapine (SEROquel) 100 mg tablet   No No   Sig: Take 1 tablet (100 mg total) by mouth daily at bedtime   Sodium Fluoride 5000 Sensitive 1 1-5 % GEL   Yes No   albuterol (PROVENTIL HFA,VENTOLIN HFA) 90 mcg/act inhaler   No No   Sig: Inhale 2 puffs every 4 (four) hours as needed for wheezing   atorvastatin (LIPITOR) 20 mg tablet   No No   Sig: Take 1 tablet (20 mg total) by mouth daily   calcium carbonate (OS-WILLY) 1250 (500 Ca) MG tablet   Yes No   Sig: Take 1,250 mg by mouth   cefadroxil (DURICEF) 500 mg capsule   Yes No   Sig: Take 500 mg by mouth every 12 (twelve) hours   gabapentin (NEURONTIN) 300 mg capsule   No No   Sig: Take 2 capsules (600 mg total) by mouth 3 (three) times a day   levothyroxine 50 mcg tablet   No No   Sig: TAKE 1 TABLET BY MOUTH EVERY DAY   metoprolol succinate (TOPROL-XL) 25 mg 24 hr tablet   Yes No   midodrine (PROAMATINE) 10 MG tablet   No No   Sig: Take 1 tablet (10 mg total) by mouth 3 (three) times a day   oseltamivir (TAMIFLU) 75 mg capsule   No No   Sig: Take 1 capsule (75 mg total) by mouth every 12 (twelve) hours for 5 days   pantoprazole (PROTONIX) 40 mg tablet   No No   Sig: TAKE 1 TABLET BY MOUTH EVERY DAY      Facility-Administered Medications: None       Past Medical History:   Diagnosis Date    Abdominal adhesions     Anesthesia complication     difficult to wake up    Anxiety     Depression     Depression     Disease of thyroid gland     Fibromyalgia     Fractures 2006    GERD (gastroesophageal reflux disease)     History of cholecystectomy 9/7/2019    Lazy eye     resolved: 3/27/17    Medical clearance for psychiatric admission 7/13/2021    Melanoma ([facility] [address] ) 2010    Osteoarthritis 7/2018    Osteopenia     with joint pain-elevated DEV    Psychiatric disorder     Stomach disorder 1995    Tinnitus     Wears glasses     for reading       Past Surgical History:   Procedure Laterality Date    ABDOMINAL SURGERY      lysis of adhesions x 2    APPENDECTOMY      CERVICAL FUSION      May 5,2021 and Jan 01,2020    CHOLECYSTECTOMY      open    DILATION AND CURETTAGE OF UTERUS      FOOT SURGERY  5/2017    NECK SURGERY  1/2019 5/2021    NEUROMA EXCISION Right 5/26/2017    Procedure: EXCISION MASS / FIBROMA FOOT;  Surgeon: Jannette Joseph DPM;  Location: [address] [place] [address];  Service:    Lizeth Kraus PLANA VITRECTOMY W/ REPAIR OF MACULAR HOLE  12/23/2020    MT XCAPSL CTRC RMVL INSJ IO LENS PROSTH W/O ECP Left 12/6/2021    Procedure: EXTRACTION EXTRACAPSULAR CATARACT PHACO INTRAOCULAR LENS (IOL);   Surgeon: Brooklynn Mcknight MD;  Location: [place] [address] MAIN OR;  Service: Ophthalmology    RIGHT OOPHORECTOMY      WISDOM TOOTH EXTRACTION      x4       Family History   Problem Relation Age of Onset    Heart disease Mother     Stroke Mother 58    COPD Mother     Arthritis Mother     Hypertension Father     Kidney disease Brother         kidney transplant    Multiple sclerosis Sister     No Known Problems Maternal Aunt     No Known Problems Maternal Uncle     No Known Problems Paternal Aunt     No Known Problems Paternal Uncle     No Known Problems Maternal Grandmother     No Known Problems Maternal Grandfather     No Known Problems Paternal Grandmother     No Known Problems Paternal Grandfather     Dislocations Sister     Neurological problems Sister     Scoliosis Sister     ADD / ADHD Neg Hx     Anesthesia problems Neg Hx     Cancer Neg Hx     Clotting disorder Neg Hx     Collagen disease Neg Hx     Diabetes Neg Hx     Dislocations Neg Hx     Learning disabilities Neg Hx     Neurological problems Neg Hx     Osteoporosis Neg Hx     Rheumatologic disease Neg Hx     Scoliosis Neg Hx     Vascular Disease Neg Hx      I have reviewed and agree with the history as documented  E-Cigarette/Vaping    E-Cigarette Use Never User      E-Cigarette/Vaping Substances    Nicotine No     THC No     CBD No     Flavoring No     Other No     Unknown No      Social History     Tobacco Use    Smoking status: Never Smoker    Smokeless tobacco: Never Used   Vaping Use    Vaping Use: Never used   Substance Use Topics    Alcohol use: No    Drug use: No       Review of Systems   Constitutional: Negative  HENT: Negative  Eyes: Negative  Respiratory: Positive for cough and shortness of breath  Cardiovascular: Negative  Gastrointestinal: Negative  Endocrine: Negative  Genitourinary: Negative  Musculoskeletal: Positive for arthralgias and myalgias  Skin: Negative  Allergic/Immunologic: Negative  Neurological: Negative  Hematological: Negative  Psychiatric/Behavioral: Negative  All other systems reviewed and are negative  Physical Exam  Physical Exam  Constitutional:       Appearance: Normal appearance  HENT:      Head: Normocephalic and atraumatic  Nose: Nose normal       Mouth/Throat:      Mouth: Mucous membranes are moist    Eyes:      Extraocular Movements: Extraocular movements intact  Pupils: Pupils are equal, round, and reactive to light  Cardiovascular:      Rate and Rhythm: Normal rate and regular rhythm  Pulmonary:      Effort: Pulmonary effort is normal       Breath sounds: Normal breath sounds  Abdominal:      General: Abdomen is flat  Bowel sounds are normal       Palpations: Abdomen is soft  Musculoskeletal:         General: Normal range of motion  Cervical back: Normal range of motion and neck supple  Skin:     General: Skin is warm  Capillary Refill: Capillary refill takes less than 2 seconds  Neurological:      General: No focal deficit present  Mental Status: She is alert and oriented to person, place, and time  Mental status is at baseline  Psychiatric:         Mood and Affect: Mood normal          Thought Content:  Thought content normal          Vital Signs  ED Triage Vitals [05/21/22 0958]   Temperature Pulse Respirations Blood Pressure SpO2   (!) 97 2 °F (36 2 °C) 104 20 137/78 97 %      Temp Source Heart Rate Source Patient Position - Orthostatic VS BP Location FiO2 (%)   Temporal Monitor Lying Right arm --      Pain Score       --           Vitals:    05/21/22 0958   BP: 137/78   Pulse: 104   Patient Position - Orthostatic VS: Lying         Visual Acuity      ED Medications  Medications   sodium chloride 0 9 % bolus 1,000 mL (0 mL Intravenous Stopped 5/21/22 1128)   ipratropium-albuterol (DUO-NEB) 0 5-2 5 mg/3 mL inhalation solution 3 mL (3 mL Nebulization Given 5/21/22 1036)   methylPREDNISolone sodium succinate (Solu-MEDROL) injection 80 mg (80 mg Intravenous Given 5/21/22 1036)   benzonatate (TESSALON PERLES) capsule 100 mg (100 mg Oral Given 5/21/22 1036)       Diagnostic Studies  Results Reviewed     Procedure Component Value Units Date/Time    COVID/FLU/RSV - 2 hour TAT [[id number]]  (Normal) Collected: 05/21/22 1124    Lab Status: Final result Specimen: Nares from Nose Updated: 05/21/22 1207     SARS-CoV-2 Negative     INFLUENZA A PCR Negative     INFLUENZA B PCR Negative     RSV PCR Negative    Narrative:      FOR PEDIATRIC PATIENTS - copy/paste COVID Guidelines URL to browser: [email or web address]  [email or web address]    SARS-CoV-2 assay is a Nucleic Acid Amplification assay intended for the  qualitative detection of nucleic acid from SARS-CoV-2 in nasopharyngeal  swabs  Results are for the presumptive identification of SARS-CoV-2 RNA  Positive results are indicative of infection with SARS-CoV-2, the virus  causing COVID-19, but do not rule out bacterial infection or co-infection  with other viruses  Laboratories within the [place] and its  territories are required to report all positive results to the appropriate  public health authorities  Negative results do not preclude SARS-CoV-2  infection and should not be used as the sole basis for treatment or other  patient management decisions  Negative results must be combined with  clinical observations, patient history, and epidemiological information  This test has not been FDA cleared or approved  This test has been authorized by FDA under an Emergency Use Authorization  (EUA)  This test is only authorized for the duration of time the  declaration that circumstances exist justifying the authorization of the  emergency use of an in vitro diagnostic tests for detection of SARS-CoV-2  virus and/or diagnosis of COVID-19 infection under section 564(b)(1) of  the Act, 21 U  S C  770FQN-2(X)(7), unless the authorization is terminated  or revoked sooner  The test has been validated but independent review by FDA  and CLIA is pending  Test performed using [employer] GeneXpert: This RT-PCR assay targets N2,  a region unique to SARS-CoV-2  A conserved region in the E-gene was chosen  for pan-Sarbecovirus detection which includes SARS-CoV-2      Comprehensive metabolic panel [[id number]]  (Abnormal) Collected: 05/21/22 1039    Lab Status: Final result Specimen: Blood from Arm, Right Updated: 05/21/22 1104     Sodium 134 mmol/L      Potassium 4 3 mmol/L      Chloride 101 mmol/L      CO2 26 mmol/L      ANION GAP 7 mmol/L      BUN 11 mg/dL      Creatinine 0 93 mg/dL      Glucose 121 mg/dL      Calcium 9 0 mg/dL      Corrected Calcium 9 6 mg/dL      AST 42 U/L      ALT 60 U/L      Alkaline Phosphatase 123 U/L      Total Protein 7 6 g/dL      Albumin 3 2 g/dL      Total Bilirubin 0 42 mg/dL      eGFR 64 ml/min/1 73sq m     Narrative:      [place] guidelines for Chronic Kidney Disease (CKD):     Stage 1 with normal or high GFR (GFR > 90 mL/min/1 73 square meters)    Stage 2 Mild CKD (GFR = 60-89 mL/min/1 73 square meters)    Stage 3A Moderate CKD (GFR = 45-59 mL/min/1 73 square meters)    Stage 3B Moderate CKD (GFR = 30-44 mL/min/1 73 square meters)    Stage 4 Severe CKD (GFR = 15-29 mL/min/1 73 square meters)    Stage 5 End Stage CKD (GFR <15 mL/min/1 73 square meters)  Note: GFR calculation is accurate only with a steady state creatinine    Magnesium [[id number]]  (Normal) Collected: 05/21/22 1039    Lab Status: Final result Specimen: Blood from Arm, Right Updated: 05/21/22 1104     Magnesium 2 1 mg/dL     CBC and differential [[id number]]  (Abnormal) Collected: 05/21/22 1039    Lab Status: Final result Specimen: Blood from Arm, Right Updated: 05/21/22 1047     WBC 9 40 Thousand/uL      RBC 4 34 Million/uL      Hemoglobin 12 2 g/dL      Hematocrit 38 2 %      MCV 88 fL      MCH 28 1 pg      MCHC 31 9 g/dL      RDW 13 3 %      MPV 10 5 fL      Platelets 147 Thousands/uL      nRBC 0 /100 WBCs      Neutrophils Relative 77 %      Immat GRANS % 0 %      Lymphocytes Relative 12 %      Monocytes Relative 9 %      Eosinophils Relative 2 %      Basophils Relative 0 %      Neutrophils Absolute 7 08 Thousands/µL      Immature Grans Absolute 0 03 Thousand/uL      Lymphocytes Absolute 1 17 Thousands/µL      Monocytes Absolute 0 87 Thousand/µL      Eosinophils Absolute 0 21 Thousand/µL      Basophils Absolute 0 04 Thousands/µL                  XR chest 1 view portable   Final Result by Cherelle Renae MD (05/21 1153)      No acute cardiopulmonary disease  Workstation performed: [id number]                    Procedures  Procedures         ED Course                                             MDM  Number of Diagnoses or Management Options     Amount and/or Complexity of Data Reviewed  Clinical lab tests: ordered and reviewed  Tests in the radiology section of CPT®: reviewed and ordered  Tests in the medicine section of CPT®: ordered and reviewed    Patient Progress  Patient progress: stable      Disposition  Final diagnoses:   Cough   Acute bronchitis     Time reflects when diagnosis was documented in both MDM as applicable and the Disposition within this note     Time User Action Codes Description Comment    5/21/2022 12:30 PM Kelli Scanlon Add [R05 9] Cough     5/21/2022 12:30 PM Carmen Scanlon Add [J20 9] Acute bronchitis       ED Disposition     ED Disposition   Discharge    Condition   Stable    Date/Time   Sat May 21, 2022 12:30 PM    Comment   Baltazar Yo discharge to home/self care  Follow-up Information     Follow up With Specialties Details Why Contact Info Additional Information    Marichuy Chisholm MD Internal Medicine, Family Medicine Schedule an appointment as soon as possible for a visit   [address]    [address] [place] [address]  [address] [facility] Emergency Department Emergency Medicine   [address] [place] [address] [address] [address]  [address] [place] [address] Emergency Department, Jackie Moore, Mitchel tucker, [id number]          Discharge Medication List as of 5/21/2022 12:31 PM      START taking these medications    Details   !! albuterol (ProAir HFA) 90 mcg/act inhaler Inhale 2 puffs every 6 (six) hours as needed for wheezing, Starting Sat 5/21/2022, Normal      azithromycin (ZITHROMAX) 250 mg tablet Take 2 tablets today then 1 tablet daily x 4 days, Normal      naproxen (NAPROSYN) 500 mg tablet Take 1 tablet (500 mg total) by mouth in the morning and 1 tablet (500 mg total) in the evening  Take with meals  Do all this for 5 days  , Starting Sat 5/21/2022, Until Thu 5/26/2022, Normal      predniSONE 20 mg tablet Take 3 tablets (60 mg total) by mouth in the morning for 5 days  , Starting Sat 5/21/2022, Until Thu 5/26/2022, Normal       !! - Potential duplicate medications found  Please discuss with provider  CONTINUE these medications which have NOT CHANGED    Details   !! albuterol (PROVENTIL HFA,VENTOLIN HFA) 90 mcg/act inhaler Inhale 2 puffs every 4 (four) hours as needed for wheezing, Starting Mon 7/6/2020, Normal      ALPRAZolam (XANAX) 0 5 mg tablet Take 0 5 mg by mouth 2 (two) times a day as needed  , Starting Sun 5/23/2021, Historical Med      atorvastatin (LIPITOR) 20 mg tablet Take 1 tablet (20 mg total) by mouth daily, Starting Mon 9/13/2021, Normal      calcium carbonate (OS-WILLY) 1250 (500 Ca) MG tablet Take 1,250 mg by mouth, Historical Med      cefadroxil (DURICEF) 500 mg capsule Take 500 mg by mouth every 12 (twelve) hours, Starting Tue 5/10/2022, Historical Med      !! DULoxetine (CYMBALTA) 30 mg delayed release capsule Take 1 capsule (30 mg total) by mouth daily, Starting Thu 2/24/2022, Until Wed 5/25/2022, Normal      !!  DULoxetine (CYMBALTA) 60 mg delayed release capsule Take 1 capsule (60 mg total) by mouth daily, Starting Thu 2/24/2022, Normal      gabapentin (NEURONTIN) 300 mg capsule Take 2 capsules (600 mg total) by mouth 3 (three) times a day, Starting Wed 12/1/2021, Normal      L-Methylfolate-Algae-B12-B6 (Metanx) 3-90 314-2-35 MG CAPS Take 1 tablet by mouth 2 (two) times a day, Starting Mon 3/21/2022, Until Wed 4/20/2022, Normal      levothyroxine 50 mcg tablet TAKE 1 TABLET BY MOUTH EVERY DAY, Normal      Magnesium 400 MG TABS Take by mouth daily, Historical Med      metoprolol succinate (TOPROL-XL) 25 mg 24 hr tablet Starting Fri 2/25/2022, Historical Med      midodrine (PROAMATINE) 10 MG tablet Take 1 tablet (10 mg total) by mouth 3 (three) times a day, Starting Tue 4/5/2022, Normal      oseltamivir (TAMIFLU) 75 mg capsule Take 1 capsule (75 mg total) by mouth every 12 (twelve) hours for 5 days, Starting Wed 5/18/2022, Until Mon 5/23/2022, Normal      pantoprazole (PROTONIX) 40 mg tablet TAKE 1 TABLET BY MOUTH EVERY DAY, Normal      Promethazine-DM (PHENERGAN-DM) 6 25-15 mg/5 mL oral syrup Take 5 mL by mouth as needed in the morning and 5 mL as needed at noon and 5 mL as needed in the evening and 5 mL as needed before bedtime for cough  , Starting Wed 5/18/2022, Normal      QUEtiapine (SEROquel) 100 mg tablet Take 1 tablet (100 mg total) by mouth daily at bedtime, Starting Thu 2/24/2022, Until Wed 5/25/2022, Normal      Sodium Fluoride 5000 Sensitive 1 1-5 % GEL Starting Tue 11/9/2021, Historical Med       !! - Potential duplicate medications found  Please discuss with provider  No discharge procedures on file      PDMP Review       Value Time User    PDMP Reviewed  Yes 9/13/2021  6:56 PM YURI Bernard          ED Provider  Electronically Signed by           Harvey Wolf DO  05/21/22 8212

## 2022-05-23 DIAGNOSIS — F33.41 MAJOR DEPRESSIVE DISORDER, RECURRENT, IN PARTIAL REMISSION (HCC): ICD-10-CM

## 2022-05-23 RX ORDER — QUETIAPINE FUMARATE 100 MG/1
100 TABLET, FILM COATED ORAL
Qty: 90 TABLET | Refills: 0 | Status: SHIPPED | OUTPATIENT
Start: 2022-05-23 | End: 2022-08-21

## 2022-05-23 RX ORDER — DULOXETIN HYDROCHLORIDE 60 MG/1
60 CAPSULE, DELAYED RELEASE ORAL DAILY
Qty: 90 CAPSULE | Refills: 0 | Status: SHIPPED | OUTPATIENT
Start: 2022-05-23

## 2022-05-23 RX ORDER — DULOXETIN HYDROCHLORIDE 30 MG/1
30 CAPSULE, DELAYED RELEASE ORAL DAILY
Qty: 90 CAPSULE | Refills: 0 | Status: SHIPPED | OUTPATIENT
Start: 2022-05-23 | End: 2022-08-21

## 2022-05-23 NOTE — TELEPHONE ENCOUNTER
----- Message from Julia Morales sent at 5/23/2022  2:55 PM EDT -----  Regarding: Refill Request  DULoxetine (CYMBALTA) 30 mg delayed release capsule    DULoxetine (CYMBALTA) 60 mg delayed release capsule    QUEtiapine (SEROquel) 100 mg tablet    [facility]/pharmacy #[id number] Alysha Beckwith   Phone:  [phone]  Fax:  [phone]      Next Visit 7/18/2022 Hayder Kebede last seen 4/26

## 2022-05-25 NOTE — TELEPHONE ENCOUNTER
Patient called to advise that her  tested positive for COVID  She is not having symptoms and not sure if she needs to reschedule her procedure  Please give her a call back to advise

## 2022-05-25 NOTE — TELEPHONE ENCOUNTER
I called patient and left message to c/b  Per trec patient would need to be scheduled 4 weeks out from exposure or she could get tested 5-7 days from exposure and as long as her test is negative she could be scheduled sooner  Patient is scheduled for 5/27/22 so either way she needs to be rescheduled   Thank you

## 2022-05-27 ENCOUNTER — TELEPHONE (OUTPATIENT)
Dept: NEUROLOGY | Facility: CLINIC | Age: 67
End: 2022-05-27

## 2022-05-31 ENCOUNTER — SOCIAL WORK (OUTPATIENT)
Dept: BEHAVIORAL/MENTAL HEALTH CLINIC | Facility: CLINIC | Age: 67
End: 2022-05-31
Payer: COMMERCIAL

## 2022-05-31 DIAGNOSIS — F33.42 RECURRENT MAJOR DEPRESSIVE DISORDER, IN FULL REMISSION (HCC): Primary | ICD-10-CM

## 2022-05-31 PROCEDURE — 90834 PSYTX W PT 45 MINUTES: CPT | Performed by: SOCIAL WORKER

## 2022-05-31 NOTE — PSYCH
This note was not shared with the patient due to this is a psychotherapy note    Psychotherapy Provided: Individual Psychotherapy 45 minutes     Length of time in session: 45 minutes, follow up in 2 week    Encounter Diagnosis     ICD-10-CM    1  Recurrent major depressive disorder, in full remission ([facility] [address] )  F33 [id number]        Goals addressed in session: Goal 1     Pain:      none    0    Current suicide risk : Low     DATA: Met with Beronica Hernadez for scheduled individual session  She reported feeling well stating that she is taking her medication as prescribed, abstaining from overly stressful experiences, and engaging with regular meaningful activities  As such she is requesting to discontinue treatment as she feels that she has met her treatment goals, which she has  Writer commended her for her efforts and we agreed that her next appointment in two weeks would be her last      ASSESSMENT: Beronica Hernadez presents with a euthymic mood  Her affect is normal range and intensity, appropriate  Roxannejulio Hernadez exhibits good therapeutic rapport with this clinician  Beronica Orlandothal continues to exhibit willingness to work on treatment goals and objectives  PLAN: Roxannejulio OrlandoMaricarmen will return in 2 weeks for the next scheduled session  Between sessions, Beronica Hernadez will use CBT skills to manage mood and will report back during the next session re: successes and barriers  Behavioral Health Treatment Plan [facility]: Diagnosis and Treatment Plan explained to González Beck relates understanding diagnosis and is agreeable to Treatment Plan   Yes

## 2022-06-07 ENCOUNTER — OFFICE VISIT (OUTPATIENT)
Dept: NEUROLOGY | Facility: CLINIC | Age: 67
End: 2022-06-07
Payer: COMMERCIAL

## 2022-06-07 VITALS
WEIGHT: 170 LBS | SYSTOLIC BLOOD PRESSURE: 139 MMHG | BODY MASS INDEX: 28.32 KG/M2 | HEART RATE: 77 BPM | HEIGHT: 65 IN | DIASTOLIC BLOOD PRESSURE: 82 MMHG | TEMPERATURE: 96.2 F

## 2022-06-07 DIAGNOSIS — M48.02 CERVICAL SPINAL STENOSIS: ICD-10-CM

## 2022-06-07 DIAGNOSIS — G62.9 PERIPHERAL NEUROPATHY: Primary | ICD-10-CM

## 2022-06-07 DIAGNOSIS — M48.061 LUMBAR SPINAL STENOSIS: ICD-10-CM

## 2022-06-07 DIAGNOSIS — M79.7 FIBROMYALGIA: ICD-10-CM

## 2022-06-07 DIAGNOSIS — I95.1 ORTHOSTATIC HYPOTENSION: ICD-10-CM

## 2022-06-07 PROCEDURE — 99214 OFFICE O/P EST MOD 30 MIN: CPT | Performed by: PSYCHIATRY & NEUROLOGY

## 2022-06-07 RX ORDER — GABAPENTIN 300 MG/1
600 CAPSULE ORAL 2 TIMES DAILY
Qty: 120 CAPSULE | Refills: 6 | Status: SHIPPED | OUTPATIENT
Start: 2022-06-07

## 2022-06-07 RX ORDER — BACLOFEN 10 MG/1
TABLET ORAL EVERY 12 HOURS
COMMUNITY

## 2022-06-07 NOTE — PROGRESS NOTES
Return NeuroOutpatient Note        Vee Keller  [id number]  73 y o   1955       Fibromyalgia and Cervical radiculopathy        History obtained from:  Patient     HPI/Subjective:    Vee Keller is a 72 yo F with PMH of FM, peripheral neuropathy, cervical radiculopathy presents as f/u  Patient was noted to be orthostatic and was placed and it was raised 10mg tid  She hasn't been getting dizzy spells lately  She has a loop recorder in place  As for neuropathy, patient is only using gabapentin 600mg bid and this seems to manage her symptoms      Patient's MRI LS spine showed asymmetric L4-5 foraminal stenosis  Patient underwent anterior fusion of C3-4 and C4-5  There was persistent left C4-5, 5-6 foraminal stenosis and it indicated possible radiculitis  She is soon seeing her surgeon again       Patient has taken herself off of trazodone and that has helped with alertness  Patient had EMG of LE in March of 2021 which had revealed left sided superficial peroneal neuropathy, otherwise it was normal      She tries to walk 10,000 steps a day  She is actively trying to lose weight         Past Medical History:   Diagnosis Date    Abdominal adhesions     Anesthesia complication     difficult to wake up    Anxiety     Depression     Depression     Disease of thyroid gland     Fibromyalgia     Fractures 2006    GERD (gastroesophageal reflux disease)     History of cholecystectomy 9/7/2019    Lazy eye     resolved: 3/27/17    Medical clearance for psychiatric admission 7/13/2021    Melanoma ([facility] [address] ) 2010    Osteoarthritis 7/2018    Osteopenia     with joint pain-elevated DEV    Psychiatric disorder     Stomach disorder 1995    Tinnitus     Wears glasses     for reading     Social History     Socioeconomic History    Marital status: /Civil Union     Spouse name: Not on file    Number of children: Not on file    Years of education: Not on file    Highest education level: Not on file Occupational History    Not on file   Tobacco Use    Smoking status: Never Smoker    Smokeless tobacco: Never Used   Vaping Use    Vaping Use: Never used   Substance and Sexual Activity    Alcohol use: No    Drug use: No    Sexual activity: Not Currently   Other Topics Concern    Not on file   Social History Narrative    Not on file     Social Determinants of Health     Financial Resource Strain: Not on file   Food Insecurity: Not on file   Transportation Needs: Not on file   Physical Activity: Not on file   Stress: Not on file   Social Connections: Not on file   Intimate Partner Violence: Not on file   Housing Stability: Not on file     Family History   Problem Relation Age of Onset    Heart disease Mother     Stroke Mother 58    COPD Mother     Arthritis Mother     Hypertension Father     Kidney disease Brother         kidney transplant    Multiple sclerosis Sister     No Known Problems Maternal Aunt     No Known Problems Maternal Uncle     No Known Problems Paternal Aunt     No Known Problems Paternal Uncle     No Known Problems Maternal Grandmother     No Known Problems Maternal Grandfather     No Known Problems Paternal Grandmother     No Known Problems Paternal Grandfather     Dislocations Sister     Neurological problems Sister     Scoliosis Sister     ADD / ADHD Neg Hx     Anesthesia problems Neg Hx     Cancer Neg Hx     Clotting disorder Neg Hx     Collagen disease Neg Hx     Diabetes Neg Hx     Dislocations Neg Hx     Learning disabilities Neg Hx     Neurological problems Neg Hx     Osteoporosis Neg Hx     Rheumatologic disease Neg Hx     Scoliosis Neg Hx     Vascular Disease Neg Hx      Allergies   Allergen Reactions    Demerol [Meperidine] Lightheadedness    Sulfa Antibiotics Hives     Current Outpatient Medications on File Prior to Visit   Medication Sig Dispense Refill    albuterol (ProAir HFA) 90 mcg/act inhaler Inhale 2 puffs every 6 (six) hours as needed for wheezing 8 5 g 0    albuterol (PROVENTIL HFA,VENTOLIN HFA) 90 mcg/act inhaler Inhale 2 puffs every 4 (four) hours as needed for wheezing 1 Inhaler 0    atorvastatin (LIPITOR) 20 mg tablet Take 1 tablet (20 mg total) by mouth daily 90 tablet 3    calcium carbonate (OS-WILLY) 1250 (500 Ca) MG tablet Take 1,250 mg by mouth      cefadroxil (DURICEF) 500 mg capsule Take 500 mg by mouth every 12 (twelve) hours      DULoxetine (CYMBALTA) 30 mg delayed release capsule Take 1 capsule (30 mg total) by mouth in the morning  90 capsule 0    DULoxetine (CYMBALTA) 60 mg delayed release capsule Take 1 capsule (60 mg total) by mouth in the morning  90 capsule 0    L-Methylfolate-Algae-B12-B6 (Metanx) 3-90 314-2-35 MG CAPS Take 1 tablet by mouth 2 (two) times a day 60 capsule 2    levothyroxine 50 mcg tablet TAKE 1 TABLET BY MOUTH EVERY DAY 90 tablet 0    Magnesium 400 MG TABS Take by mouth daily      metoprolol succinate (TOPROL-XL) 25 mg 24 hr tablet       midodrine (PROAMATINE) 10 MG tablet Take 1 tablet (10 mg total) by mouth 3 (three) times a day 90 tablet 2    pantoprazole (PROTONIX) 40 mg tablet TAKE 1 TABLET BY MOUTH EVERY DAY 90 tablet 1    Promethazine-DM (PHENERGAN-DM) 6 25-15 mg/5 mL oral syrup Take 5 mL by mouth as needed in the morning and 5 mL as needed at noon and 5 mL as needed in the evening and 5 mL as needed before bedtime for cough   180 mL 0    QUEtiapine (SEROquel) 100 mg tablet Take 1 tablet (100 mg total) by mouth daily at bedtime 90 tablet 0    Sodium Fluoride 5000 Sensitive 1 1-5 % GEL       [DISCONTINUED] gabapentin (NEURONTIN) 300 mg capsule Take 2 capsules (600 mg total) by mouth 3 (three) times a day (Patient taking differently: Take 600 mg by mouth 2 (two) times a day) 180 capsule 5    ALPRAZolam (XANAX) 0 5 mg tablet Take 0 5 mg by mouth 2 (two) times a day as needed   (Patient not taking: Reported on 6/7/2022)      baclofen 10 mg tablet Every 12 hours      naproxen (NAPROSYN) 500 mg tablet Take 1 tablet (500 mg total) by mouth in the morning and 1 tablet (500 mg total) in the evening  Take with meals  Do all this for 5 days  10 tablet 0     No current facility-administered medications on file prior to visit  Review of Systems   Refer to positive review of systems in HPI     Review of Systems    Constitutional- No fever  Eyes- No visual change  ENT- Hearing normal  CV- No chest pain  Resp- No Shortness of breath  GI- No diarrhea  - Bladder normal  MS- No Arthritis   Skin- No rash  Psych- No depression  Endo- No DM  Heme- No nodes    Vitals:    06/07/22 1522   BP: 139/82   BP Location: Left arm   Patient Position: Sitting   Cuff Size: Standard   Pulse: 77   Temp: (!) 96 2 °F (35 7 °C)   TempSrc: Tympanic   Weight: 77 1 kg (170 lb)   Height: 5' 5" (1 651 m)       PHYSICAL EXAM:  Appearance: No Acute Distress  Ophthalmoscopic: Disc Flat, Normal fundus  Mental status:  Orientation: Awake, Alert, and Orientedx3  Memory: Registation 3/3 Recall 3/3  Attention: normal  Knowledge: good  Language: No aphasia  Speech: No dysarthria  Cranial Nerves:  2 No Visual Defect on Confrontation, Pupils round, equal, reactive to light  3,4,6 Extraocular Movements Intact, no nystagmus  5 Facial Sensation Intact  7 No facial asymmetry  8 Intact hearing  9,10 Palate symmetric, normal gag  11 Good shoulder shrug  12 Tongue Midline  Gait: Stable  Coordination: No ataxia with finger to nose testing, and heel to shin  Sensory: Intact, Symmetric to pinprick, light touch, vibration, and joint position  Muscle Tone: Normal              Muscle exam:  Arm Right Left Leg Right Left   Deltoid 5/5 5/5 Iliopsoas 5/5 5/5   Biceps 5/5 5/5 Quads 5/5 5/5   Triceps 5/5 5/5 Hamstrings 5/5 5/5   Wrist Extension 5/5 5/5 Ankle Dorsi Flexion 5/5 5/5   Wrist Flexion 5/5 5/5 Ankle Plantar Flexion 5/5 5/5   Interossei 5/5 5/5 Ankle Eversion 5/5 5/5   APB 5/5 5/5 Ankle Inversion 5/5 5/5     Patient appears a little restless, jumpy today  Reflexes   RJ BJ TJ KJ AJ Plantars Cantrell's   Right 2+ 2+ 2+ 2+ 2+ Downgoing Not present   Left 2+ 2+ 2+ 2+ 2+ Downgoing Not present     Personal review of  Labs:                  Diagnoses and all orders for this visit:      1  Peripheral neuropathy  gabapentin (NEURONTIN) 300 mg capsule   2  Fibromyalgia  gabapentin (NEURONTIN) 300 mg capsule   3  Cervical spinal stenosis     4  Lumbar spinal stenosis     5  Orthostatic hypotension         Patient has been stable in terms of peripheral neuropathy  Will resume gabapentin 600mg bid  She is to resume midodrine 10mg tid  She's on cymbalta for depression and FM  Asked her to address her anxiety with her psychiatrist  She says that she may just be nervous as going to convention to [employer]                   Total time of encounter:  30 min  More than 50% of the time was used in counseling and/or coordination of care  Extent of counseling and/or coordination of care        Mindi Charles MD  [facility]  [address]  [address]  [phone]

## 2022-06-13 ENCOUNTER — SOCIAL WORK (OUTPATIENT)
Dept: BEHAVIORAL/MENTAL HEALTH CLINIC | Facility: CLINIC | Age: 67
End: 2022-06-13
Payer: COMMERCIAL

## 2022-06-13 DIAGNOSIS — F33.42 RECURRENT MAJOR DEPRESSIVE DISORDER, IN FULL REMISSION (HCC): Primary | ICD-10-CM

## 2022-06-13 PROCEDURE — 90834 PSYTX W PT 45 MINUTES: CPT | Performed by: SOCIAL WORKER

## 2022-06-13 NOTE — PSYCH
This note was not shared with the patient due to this is a psychotherapy note    Psychotherapy Provided: Individual Psychotherapy 45 minutes     Length of time in session: 45 minutes    Encounter Diagnosis     ICD-10-CM    1  Recurrent major depressive disorder, in full remission ([facility] )  F33 [id number]        Goals addressed in session: Goal 1 and Goal 2     Pain:      none    0    Current suicide risk : Low     DATA: Met with Park Bellamy for scheduled individual session  This was her last scheduled appointment  We discussed skills and strategies learned through treatment, as well as, newfound acceptance and commitment to preserving herself as is  Writer commended her for her treatment progress as she is now able to articulate stressors and coping skills needed in the moment, to the point of being able to keep herself out of the hospital, as per her report  She understands that she may return to treatment at any time  ASSESSMENT: Park Bellamy presents with a euphoric mood  Her affect is normal range and intensity, appropriate  Park Bellamy exhibits good therapeutic rapport with this clinician  PLAN: Shahab Lopes will be discharged from this clinician's care  She will remain under Dorathy Bussing care for medication monitoring  She understands that she may return to treatment at any time should she experience worsening symptoms  Behavioral Health Treatment Plan [facility]: Diagnosis and Treatment Plan explained to Caesar Martinez relates understanding diagnosis and is agreeable to Treatment Plan   Yes

## 2022-06-15 ENCOUNTER — DOCUMENTATION (OUTPATIENT)
Dept: BEHAVIORAL/MENTAL HEALTH CLINIC | Facility: CLINIC | Age: 67
End: 2022-06-15

## 2022-06-15 DIAGNOSIS — F33.42 RECURRENT MAJOR DEPRESSIVE DISORDER, IN FULL REMISSION (HCC): Primary | ICD-10-CM

## 2022-06-15 NOTE — PROGRESS NOTES
Assessment/Plan:      Diagnoses and all orders for this visit:    Recurrent major depressive disorder, in full remission ([facility] [address] )          Subjective:     Patient ID: Mukesh Juan is a 73 y o  female  Outpatient Discharge Summary:   Admission Date: 8/16/2021  Sabine Martinezr was referred by Josie Yang APN  Discharge Date: 6/15/2022    Discharge Diagnosis:    1  Recurrent major depressive disorder, in full remission [facility])         Treating Physician: None  Treatment Complications: None  Presenting Problem: History of recurrent major depression and childhood trauma complicated by alcohol and gambling addictions  Course of treatment includes:    individual therapy   Treatment Progress: good  Criteria for Discharge: completed treatment goals and objectives and is no longer in need of services  Aftercare recommendations include Follow up with treating APN and comply with recommendations as indicated     Discharge Medications include:  Current Outpatient Medications:     albuterol (ProAir HFA) 90 mcg/act inhaler, Inhale 2 puffs every 6 (six) hours as needed for wheezing, Disp: 8 5 g, Rfl: 0    albuterol (PROVENTIL HFA,VENTOLIN HFA) 90 mcg/act inhaler, Inhale 2 puffs every 4 (four) hours as needed for wheezing, Disp: 1 Inhaler, Rfl: 0    ALPRAZolam (XANAX) 0 5 mg tablet, Take 0 5 mg by mouth 2 (two) times a day as needed   (Patient not taking: Reported on 6/7/2022), Disp: , Rfl:     atorvastatin (LIPITOR) 20 mg tablet, Take 1 tablet (20 mg total) by mouth daily, Disp: 90 tablet, Rfl: 3    baclofen 10 mg tablet, Every 12 hours, Disp: , Rfl:     calcium carbonate (OS-WILLY) 1250 (500 Ca) MG tablet, Take 1,250 mg by mouth, Disp: , Rfl:     cefadroxil (DURICEF) 500 mg capsule, Take 500 mg by mouth every 12 (twelve) hours, Disp: , Rfl:     DULoxetine (CYMBALTA) 30 mg delayed release capsule, Take 1 capsule (30 mg total) by mouth in the morning , Disp: 90 capsule, Rfl: 0    DULoxetine (CYMBALTA) 60 mg delayed release capsule, Take 1 capsule (60 mg total) by mouth in the morning , Disp: 90 capsule, Rfl: 0    gabapentin (NEURONTIN) 300 mg capsule, Take 2 capsules (600 mg total) by mouth 2 (two) times a day, Disp: 120 capsule, Rfl: 6    L-Methylfolate-Algae-B12-B6 (Metanx) 3-90 314-2-35 MG CAPS, Take 1 tablet by mouth 2 (two) times a day, Disp: 60 capsule, Rfl: 2    levothyroxine 50 mcg tablet, TAKE 1 TABLET BY MOUTH EVERY DAY, Disp: 90 tablet, Rfl: 0    Magnesium 400 MG TABS, Take by mouth daily, Disp: , Rfl:     metoprolol succinate (TOPROL-XL) 25 mg 24 hr tablet, , Disp: , Rfl:     midodrine (PROAMATINE) 10 MG tablet, Take 1 tablet (10 mg total) by mouth 3 (three) times a day, Disp: 90 tablet, Rfl: 2    naproxen (NAPROSYN) 500 mg tablet, Take 1 tablet (500 mg total) by mouth in the morning and 1 tablet (500 mg total) in the evening  Take with meals  Do all this for 5 days  , Disp: 10 tablet, Rfl: 0    pantoprazole (PROTONIX) 40 mg tablet, TAKE 1 TABLET BY MOUTH EVERY DAY, Disp: 90 tablet, Rfl: 1    Promethazine-DM (PHENERGAN-DM) 6 25-15 mg/5 mL oral syrup, Take 5 mL by mouth as needed in the morning and 5 mL as needed at noon and 5 mL as needed in the evening and 5 mL as needed before bedtime for cough  , Disp: 180 mL, Rfl: 0    QUEtiapine (SEROquel) 100 mg tablet, Take 1 tablet (100 mg total) by mouth daily at bedtime, Disp: 90 tablet, Rfl: 0    Sodium Fluoride 5000 Sensitive 1 1-5 % GEL, , Disp: , Rfl:     Prognosis: good

## 2022-06-16 ENCOUNTER — TELEPHONE (OUTPATIENT)
Dept: FAMILY MEDICINE CLINIC | Facility: CLINIC | Age: 67
End: 2022-06-16

## 2022-06-16 NOTE — TELEPHONE ENCOUNTER
Pt dropped off form for completion by Dr Dick villalta in February  She would like to pickup tomorrow at her nursing visit      In nurse pending 1

## 2022-06-27 ENCOUNTER — OFFICE VISIT (OUTPATIENT)
Dept: FAMILY MEDICINE CLINIC | Facility: CLINIC | Age: 67
End: 2022-06-27
Payer: COMMERCIAL

## 2022-06-27 VITALS
TEMPERATURE: 97.6 F | DIASTOLIC BLOOD PRESSURE: 64 MMHG | WEIGHT: 166 LBS | HEIGHT: 65 IN | HEART RATE: 84 BPM | SYSTOLIC BLOOD PRESSURE: 104 MMHG | BODY MASS INDEX: 27.66 KG/M2 | RESPIRATION RATE: 16 BRPM

## 2022-06-27 DIAGNOSIS — R04.2 HEMOPTYSIS: ICD-10-CM

## 2022-06-27 DIAGNOSIS — G62.9 NEUROPATHY: ICD-10-CM

## 2022-06-27 DIAGNOSIS — J20.9 ACUTE BRONCHITIS, UNSPECIFIED ORGANISM: Primary | ICD-10-CM

## 2022-06-27 PROCEDURE — 1036F TOBACCO NON-USER: CPT | Performed by: NURSE PRACTITIONER

## 2022-06-27 PROCEDURE — 3725F SCREEN DEPRESSION PERFORMED: CPT | Performed by: NURSE PRACTITIONER

## 2022-06-27 PROCEDURE — 3008F BODY MASS INDEX DOCD: CPT | Performed by: NURSE PRACTITIONER

## 2022-06-27 PROCEDURE — 1160F RVW MEDS BY RX/DR IN RCRD: CPT | Performed by: NURSE PRACTITIONER

## 2022-06-27 PROCEDURE — 99213 OFFICE O/P EST LOW 20 MIN: CPT | Performed by: NURSE PRACTITIONER

## 2022-06-27 RX ORDER — CEFDINIR 300 MG/1
300 CAPSULE ORAL EVERY 12 HOURS SCHEDULED
Qty: 20 CAPSULE | Refills: 0 | Status: SHIPPED | OUTPATIENT
Start: 2022-06-27 | End: 2022-07-07

## 2022-06-27 RX ORDER — L-METHYLFOLATE-ALGAE-VIT B12-B6 CAP 3-90.314-2-35 MG 3-90.314-2-35 MG
1 CAP ORAL 2 TIMES DAILY
Qty: 60 CAPSULE | Refills: 2 | Status: CANCELLED | OUTPATIENT
Start: 2022-06-27 | End: 2022-07-27

## 2022-06-27 RX ORDER — METHYLPREDNISOLONE 4 MG/1
TABLET ORAL
Qty: 1 EACH | Refills: 0 | Status: SHIPPED | OUTPATIENT
Start: 2022-06-27 | End: 2022-07-03

## 2022-06-27 NOTE — PROGRESS NOTES
Assessment/Plan:    Take medications as directed  Recommended Robitussin DM  Increase fluids  Will need CT if her symptoms do not resolve    1  Acute bronchitis, unspecified organism  -     cefdinir (OMNICEF) 300 mg capsule; Take 1 capsule (300 mg total) by mouth every 12 (twelve) hours for 10 days  -     methylPREDNISolone 4 MG tablet therapy pack; Use as directed on package    2  Hemoptysis  Comments:  will check CT if symptoms do not resolve with antibiotics           There are no Patient Instructions on file for this visit  Return if symptoms worsen or fail to improve  Subjective:      Patient ID: Charissa Givens is a 73 y o  female  Chief Complaint   Patient presents with    Cough     C/O for 3 weeks now and has some blood tinged mucous Jmoyle LPN       She has had a cough for the past month  Initially thought to be the flu, and was then treated with abx, evaluated in the ER  CXR was normal     She has now blood tinged sputum for the past few weeks  initially more brown, now more red  Breathing feels labored at times  Reports tactile fever on occasions, lasts for a short period  She has had night sweats for the same amount of time  Occasionally soaking her pillow   She is a nonsmoker        The following portions of the patient's history were reviewed and updated as appropriate: allergies, current medications, past family history, past medical history, past social history, past surgical history and problem list     Review of Systems   Constitutional: Positive for diaphoresis  Negative for chills, fatigue and fever  HENT: Negative for congestion, ear pain, postnasal drip, rhinorrhea, sinus pressure and sore throat  Respiratory: Positive for cough, shortness of breath and wheezing  Cardiovascular: Negative for chest pain  Gastrointestinal: Negative for abdominal pain, diarrhea, nausea and vomiting  Musculoskeletal: Negative for arthralgias  Skin: Negative for rash  Neurological: Negative for headaches  Current Outpatient Medications   Medication Sig Dispense Refill    albuterol (ProAir HFA) 90 mcg/act inhaler Inhale 2 puffs every 6 (six) hours as needed for wheezing 8 5 g 0    ALPRAZolam (XANAX) 0 5 mg tablet Take 0 5 mg by mouth 2 (two) times a day as needed      atorvastatin (LIPITOR) 20 mg tablet Take 1 tablet (20 mg total) by mouth daily 90 tablet 3    baclofen 10 mg tablet Every 12 hours      calcium carbonate (OS-WILLY) 1250 (500 Ca) MG tablet Take 1,250 mg by mouth      cefdinir (OMNICEF) 300 mg capsule Take 1 capsule (300 mg total) by mouth every 12 (twelve) hours for 10 days 20 capsule 0    DULoxetine (CYMBALTA) 30 mg delayed release capsule Take 1 capsule (30 mg total) by mouth in the morning  90 capsule 0    DULoxetine (CYMBALTA) 60 mg delayed release capsule Take 1 capsule (60 mg total) by mouth in the morning   90 capsule 0    gabapentin (NEURONTIN) 300 mg capsule Take 2 capsules (600 mg total) by mouth 2 (two) times a day 120 capsule 6    L-Methylfolate-Algae-B12-B6 (Metanx) 3-90 314-2-35 MG CAPS Take 1 tablet by mouth 2 (two) times a day 60 capsule 2    levothyroxine 50 mcg tablet TAKE 1 TABLET BY MOUTH EVERY DAY 90 tablet 0    Magnesium 400 MG TABS Take by mouth daily      methylPREDNISolone 4 MG tablet therapy pack Use as directed on package 1 each 0    metoprolol succinate (TOPROL-XL) 25 mg 24 hr tablet Take 25 mg by mouth daily      midodrine (PROAMATINE) 10 MG tablet Take 1 tablet (10 mg total) by mouth 3 (three) times a day 90 tablet 2    pantoprazole (PROTONIX) 40 mg tablet TAKE 1 TABLET BY MOUTH EVERY DAY 90 tablet 1    QUEtiapine (SEROquel) 100 mg tablet Take 1 tablet (100 mg total) by mouth daily at bedtime 90 tablet 0    Sodium Fluoride 5000 Sensitive 1 1-5 % GEL As dir      Promethazine-DM (PHENERGAN-DM) 6 25-15 mg/5 mL oral syrup Take 5 mL by mouth as needed in the morning and 5 mL as needed at noon and 5 mL as needed in the evening and 5 mL as needed before bedtime for cough  (Patient not taking: Reported on 6/27/2022) 180 mL 0     No current facility-administered medications for this visit  Objective:    /64   Pulse 84   Temp 97 6 °F (36 4 °C)   Resp 16   Ht 5' 5" (1 651 m)   Wt 75 3 kg (166 lb)   LMP  (LMP Unknown)   BMI 27 62 kg/m²        Physical Exam  Vitals and nursing note reviewed  Constitutional:       Appearance: Normal appearance  She is well-developed  HENT:      Right Ear: Tympanic membrane normal       Left Ear: Tympanic membrane normal       Nose: Nose normal       Mouth/Throat:      Pharynx: Oropharynx is clear  No oropharyngeal exudate or posterior oropharyngeal erythema  Eyes:      Conjunctiva/sclera: Conjunctivae normal    Cardiovascular:      Rate and Rhythm: Normal rate and regular rhythm  Pulses: Normal pulses  Heart sounds: Normal heart sounds  No murmur heard  Pulmonary:      Effort: Pulmonary effort is normal       Breath sounds: Wheezing present  No rhonchi  Musculoskeletal:      Cervical back: Normal range of motion  Lymphadenopathy:      Cervical: No cervical adenopathy  Skin:     General: Skin is warm and dry  Neurological:      Mental Status: She is alert     Psychiatric:         Mood and Affect: Mood normal          Behavior: Behavior normal                 Jered Remedies, CRNP

## 2022-06-28 ENCOUNTER — TELEPHONE (OUTPATIENT)
Dept: GASTROENTEROLOGY | Facility: CLINIC | Age: 67
End: 2022-06-28

## 2022-06-28 NOTE — TELEPHONE ENCOUNTER
I lmom confirming pt's colonoscopy  scheduled on 7/6/22 at [facility] with Dr Alcira Keen   Pt is aware that [facility] will be calling 1-2 days prior with the arrival time  Pt is aware of clear liquid diet the day before as well as the bowel cleansing preparation  Pt is aware that she will need a  the day of the procedure due to being under sedation  Pt is aware to contact insurance if has questions regarding coverage of procedure  I asked pt to please call back if she does not have instructions or if has any questions

## 2022-07-02 DIAGNOSIS — I95.1 ORTHOSTATIC HYPOTENSION: ICD-10-CM

## 2022-07-05 RX ORDER — MIDODRINE HYDROCHLORIDE 10 MG/1
TABLET ORAL
Qty: 270 TABLET | Refills: 0 | Status: SHIPPED | OUTPATIENT
Start: 2022-07-05 | End: 2022-10-03

## 2022-07-06 ENCOUNTER — ANESTHESIA EVENT (OUTPATIENT)
Dept: GASTROENTEROLOGY | Facility: AMBULATORY SURGERY CENTER | Age: 67
End: 2022-07-06

## 2022-07-06 ENCOUNTER — ANESTHESIA (OUTPATIENT)
Dept: GASTROENTEROLOGY | Facility: AMBULATORY SURGERY CENTER | Age: 67
End: 2022-07-06

## 2022-07-06 ENCOUNTER — HOSPITAL ENCOUNTER (OUTPATIENT)
Dept: GASTROENTEROLOGY | Facility: AMBULATORY SURGERY CENTER | Age: 67
Discharge: HOME | End: 2022-07-06
Payer: COMMERCIAL

## 2022-07-06 VITALS
HEIGHT: 65 IN | WEIGHT: 154 LBS | DIASTOLIC BLOOD PRESSURE: 72 MMHG | SYSTOLIC BLOOD PRESSURE: 114 MMHG | HEART RATE: 72 BPM | BODY MASS INDEX: 25.66 KG/M2 | TEMPERATURE: 96.6 F | OXYGEN SATURATION: 98 % | RESPIRATION RATE: 18 BRPM

## 2022-07-06 DIAGNOSIS — Z86.010 HISTORY OF COLON POLYPS: ICD-10-CM

## 2022-07-06 PROCEDURE — 45385 COLONOSCOPY W/LESION REMOVAL: CPT | Performed by: INTERNAL MEDICINE

## 2022-07-06 RX ORDER — LIDOCAINE HYDROCHLORIDE 10 MG/ML
INJECTION, SOLUTION EPIDURAL; INFILTRATION; INTRACAUDAL; PERINEURAL AS NEEDED
Status: DISCONTINUED | OUTPATIENT
Start: 2022-07-06 | End: 2022-07-06

## 2022-07-06 RX ORDER — METOPROLOL TARTRATE 5 MG/5ML
INJECTION INTRAVENOUS AS NEEDED
Status: DISCONTINUED | OUTPATIENT
Start: 2022-07-06 | End: 2022-07-06

## 2022-07-06 RX ORDER — PROPOFOL 10 MG/ML
INJECTION, EMULSION INTRAVENOUS AS NEEDED
Status: DISCONTINUED | OUTPATIENT
Start: 2022-07-06 | End: 2022-07-06

## 2022-07-06 RX ORDER — SODIUM CHLORIDE 9 MG/ML
20 INJECTION, SOLUTION INTRAVENOUS CONTINUOUS
Status: DISCONTINUED | OUTPATIENT
Start: 2022-07-06 | End: 2022-07-10 | Stop reason: HOSPADM

## 2022-07-06 RX ORDER — SODIUM CHLORIDE, SODIUM LACTATE, POTASSIUM CHLORIDE, CALCIUM CHLORIDE 600; 310; 30; 20 MG/100ML; MG/100ML; MG/100ML; MG/100ML
INJECTION, SOLUTION INTRAVENOUS CONTINUOUS PRN
Status: DISCONTINUED | OUTPATIENT
Start: 2022-07-06 | End: 2022-07-06

## 2022-07-06 RX ADMIN — PROPOFOL 50 MG: 10 INJECTION, EMULSION INTRAVENOUS at 10:26

## 2022-07-06 RX ADMIN — PROPOFOL 50 MG: 10 INJECTION, EMULSION INTRAVENOUS at 10:13

## 2022-07-06 RX ADMIN — PROPOFOL 50 MG: 10 INJECTION, EMULSION INTRAVENOUS at 10:17

## 2022-07-06 RX ADMIN — METOPROLOL TARTRATE 1 MG: 5 INJECTION INTRAVENOUS at 10:36

## 2022-07-06 RX ADMIN — SODIUM CHLORIDE, SODIUM LACTATE, POTASSIUM CHLORIDE, CALCIUM CHLORIDE: 600; 310; 30; 20 INJECTION, SOLUTION INTRAVENOUS at 09:36

## 2022-07-06 RX ADMIN — PROPOFOL 20 MG: 10 INJECTION, EMULSION INTRAVENOUS at 10:15

## 2022-07-06 RX ADMIN — PROPOFOL 50 MG: 10 INJECTION, EMULSION INTRAVENOUS at 10:21

## 2022-07-06 RX ADMIN — LIDOCAINE HYDROCHLORIDE 50 MG: 10 INJECTION, SOLUTION EPIDURAL; INFILTRATION; INTRACAUDAL; PERINEURAL at 10:11

## 2022-07-06 RX ADMIN — PROPOFOL 30 MG: 10 INJECTION, EMULSION INTRAVENOUS at 10:29

## 2022-07-06 RX ADMIN — PROPOFOL 100 MG: 10 INJECTION, EMULSION INTRAVENOUS at 10:10

## 2022-07-06 NOTE — ANESTHESIA PREPROCEDURE EVALUATION
Procedure:  COLONOSCOPY    Relevant Problems   CARDIO   (+) Migraine without aura and without status migrainosus, not intractable      ENDO   (+) Adult hypothyroidism      GI/HEPATIC   (+) Gastroesophageal reflux disease with esophagitis without hemorrhage      MUSCULOSKELETAL   (+) Fibromyalgia   (+) 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HCC)   (+) Osteoarthritis of knee   (+) Polymyalgia (HCC)      NEURO/PSYCH   (+) Cervical myelopathy with cervical radiculopathy (HCC)   (+) Depression   (+) Fibromyalgia   (+) Generalized anxiety disorder   (+) History of melanoma   (+) History of migraine headaches   (+) History of vertigo   (+) Major depressive disorder, recurrent, in partial remission (HCC)   (+) Migraine without aura and without status migrainosus, not intractable   (+) PTSD (post-traumatic stress disorder)   (+) Recurrent major depressive disorder, in full remission (HCC)   (+) Right sided temporal headache   (+) Severe episode of recurrent major depressive disorder, without psychotic features (HCC)      PULMONARY   (+) Mild asthma        Physical Exam    Airway    Mallampati score: II  TM Distance: >3 FB  Neck ROM: full     Dental       Cardiovascular      Pulmonary      Other Findings        Anesthesia Plan  ASA Score- 2     Anesthesia Type- IV sedation with anesthesia with ASA Monitors  Additional Monitors:   Airway Plan:     Comment: Patient has a loop recorder for investigation of orthostatic hypotension per her report          Plan Factors-Exercise tolerance (METS): >4 METS  Chart reviewed  EKG reviewed  Imaging results reviewed  Existing labs reviewed  Patient summary reviewed  Induction- intravenous  Postoperative Plan-     Informed Consent- Anesthetic plan and risks discussed with patient  I personally reviewed this patient with the CRNA  Discussed and agreed on the Anesthesia Plan with the CRNA             NPO appropriate   Discussed benefits/risks of monitored anesthetic care and discussed providing a dynamic level of mild to deep sedation  Risks include awareness, airway obstruction, aspiration which may necessitate conversion to general anesthesia  All questions answered  Patient understands and wishes to proceed  Anesthesia plan and consent discussed with Ian Jean Baptiste who expressed understanding and agreement  Risks/benefits and alternatives discussed with patient including possible PONV, sore throat, damage to teeth/lips/gums and possibility of rare anesthetic and surgical emergencies

## 2022-07-06 NOTE — H&P
History and Physical - [facility]  Fabrizio Patterson 73 y o  female MRN: [id number]                  HPI: Fabrizio Patterson is a 73y o  year old female who presents for history of polyps      REVIEW OF SYSTEMS: Per the HPI, and otherwise unremarkable  Historical Information   Past Medical History:   Diagnosis Date    Abdominal adhesions     Anesthesia complication     difficult to wake up    Anxiety     Arthritis     Cancer ([facility] [address] )     melanoma    Depression     Depression     Disease of thyroid gland     Fibromyalgia     Fibromyalgia, primary     Fractures 2006    GERD (gastroesophageal reflux disease)     History of cholecystectomy 09/07/2019    Hyperlipidemia     Irregular heart beat     can be tachy; also has orthoststic hypotension    Lazy eye     resolved: 3/27/17    Medical clearance for psychiatric admission 07/13/2021    Melanoma ([facility] [address] ) 2010    Osteoarthritis 07/2018    Osteopenia     with joint pain-elevated DEV    Psychiatric disorder     Shortness of breath     assoc with tachycardia    Stomach disorder 1995    Tinnitus     Wears glasses     for reading     Past Surgical History:   Procedure Laterality Date    ABDOMINAL SURGERY      lysis of adhesions x 2    APPENDECTOMY      CERVICAL FUSION      May 5,2021 and Jan 01,2020    CHOLECYSTECTOMY      open    COLONOSCOPY      DILATION AND CURETTAGE OF UTERUS      FOOT SURGERY  05/2017    NECK SURGERY  01/2019 5/2021    NEUROMA EXCISION Right 05/26/2017    Procedure: EXCISION MASS / FIBROMA FOOT;  Surgeon: Rema Felty, DPM;  Location: [address] [place] [address];  Service:    Eduardo Rival PLANA VITRECTOMY W/ REPAIR OF MACULAR HOLE  12/23/2020    MN XCAPSL CTRC RMVL INSJ IO LENS PROSTH W/O ECP Left 12/06/2021    Procedure: EXTRACTION EXTRACAPSULAR CATARACT PHACO INTRAOCULAR LENS (IOL);   Surgeon: Al Bernardo MD;  [facility] MAIN OR;  Service: Ophthalmology    RIGHT OOPHORECTOMY      WISDOM TOOTH EXTRACTION      x4 Social History   Social History     Substance and Sexual Activity   Alcohol Use No     Social History     Substance and Sexual Activity   Drug Use No     Social History     Tobacco Use   Smoking Status Never Smoker   Smokeless Tobacco Never Used     Family History   Problem Relation Age of Onset    Heart disease Mother     Stroke Mother 58    COPD Mother     Arthritis Mother     Hypertension Father     Kidney disease Brother         kidney transplant    Multiple sclerosis Sister     No Known Problems Maternal Aunt     No Known Problems Maternal Uncle     No Known Problems Paternal Aunt     No Known Problems Paternal Uncle     No Known Problems Maternal Grandmother     No Known Problems Maternal Grandfather     No Known Problems Paternal Grandmother     No Known Problems Paternal Grandfather     Dislocations Sister     Neurological problems Sister     Scoliosis Sister     ADD / ADHD Neg Hx     Anesthesia problems Neg Hx     Cancer Neg Hx     Clotting disorder Neg Hx     Collagen disease Neg Hx     Diabetes Neg Hx     Dislocations Neg Hx     Learning disabilities Neg Hx     Neurological problems Neg Hx     Osteoporosis Neg Hx     Rheumatologic disease Neg Hx     Scoliosis Neg Hx     Vascular Disease Neg Hx        Meds/Allergies       Current Outpatient Medications:     albuterol (ProAir HFA) 90 mcg/act inhaler    atorvastatin (LIPITOR) 20 mg tablet    calcium carbonate (OS-WILLY) 1250 (500 Ca) MG tablet    cefdinir (OMNICEF) 300 mg capsule    DULoxetine (CYMBALTA) 30 mg delayed release capsule    DULoxetine (CYMBALTA) 60 mg delayed release capsule    gabapentin (NEURONTIN) 300 mg capsule    L-Methylfolate-Algae-B12-B6 (Metanx) 3-90 314-2-35 MG CAPS    Magnesium 400 MG TABS    metoprolol succinate (TOPROL-XL) 25 mg 24 hr tablet    midodrine (PROAMATINE) 10 MG tablet    pantoprazole (PROTONIX) 40 mg tablet    QUEtiapine (SEROquel) 100 mg tablet    Sodium Fluoride 5000 Sensitive 1 1-5 % GEL    ALPRAZolam (XANAX) 0 5 mg tablet    baclofen 10 mg tablet    levothyroxine 50 mcg tablet    Promethazine-DM (PHENERGAN-DM) 6 25-15 mg/5 mL oral syrup    Allergies   Allergen Reactions    Demerol [Meperidine] Lightheadedness    Sulfa Antibiotics Hives       Objective     /97   Pulse (!) 113   Temp (!) 96 6 °F (35 9 °C) (Skin)   Resp 18   Ht 5' 5" (1 651 m)   Wt 69 9 kg (154 lb)   LMP  (LMP Unknown)   SpO2 97%   BMI 25 63 kg/m²       PHYSICAL EXAM    Gen: NAD  Head: NCAT  CV: RRR  CHEST: Clear  ABD: soft, NT/ND  EXT: no edema      ASSESSMENT/PLAN:  This is a 73y o  year old female here for colonoscopy, and she is stable and optimized for her procedure

## 2022-07-06 NOTE — ANESTHESIA POSTPROCEDURE EVALUATION
Post-Op Assessment Note    CV Status:  Stable  Pain Score: 0    Pain management: adequate     Mental Status:  Sleepy and arousable   Hydration Status:  Euvolemic   PONV Controlled:  Controlled   Airway Patency:  Patent      Post Op Vitals Reviewed: Yes      Staff: Anesthesiologist, CRNA         No complications documented      BP   112/78   Temp     Pulse 96   Resp   20   SpO2   98%

## 2022-07-17 ENCOUNTER — APPOINTMENT (EMERGENCY)
Dept: RADIOLOGY | Facility: HOSPITAL | Age: 67
End: 2022-07-17
Payer: COMMERCIAL

## 2022-07-17 ENCOUNTER — HOSPITAL ENCOUNTER (EMERGENCY)
Facility: HOSPITAL | Age: 67
Discharge: HOME | End: 2022-07-17
Attending: EMERGENCY MEDICINE | Admitting: EMERGENCY MEDICINE
Payer: COMMERCIAL

## 2022-07-17 VITALS
DIASTOLIC BLOOD PRESSURE: 86 MMHG | HEART RATE: 86 BPM | TEMPERATURE: 97.8 F | OXYGEN SATURATION: 98 % | SYSTOLIC BLOOD PRESSURE: 165 MMHG | RESPIRATION RATE: 20 BRPM | BODY MASS INDEX: 25.63 KG/M2 | WEIGHT: 154 LBS

## 2022-07-17 DIAGNOSIS — R53.1 GENERALIZED WEAKNESS: Primary | ICD-10-CM

## 2022-07-17 DIAGNOSIS — J18.9 COMMUNITY ACQUIRED PNEUMONIA: ICD-10-CM

## 2022-07-17 LAB
ALBUMIN SERPL BCP-MCNC: 3.5 G/DL (ref 3.5–5)
ALP SERPL-CCNC: 76 U/L (ref 46–116)
ALT SERPL W P-5'-P-CCNC: 33 U/L (ref 12–78)
ANION GAP SERPL CALCULATED.3IONS-SCNC: 9 MMOL/L (ref 4–13)
AST SERPL W P-5'-P-CCNC: 27 U/L (ref 5–45)
BASOPHILS # BLD AUTO: 0.04 THOUSANDS/ΜL (ref 0–0.1)
BASOPHILS NFR BLD AUTO: 1 % (ref 0–1)
BILIRUB SERPL-MCNC: 0.4 MG/DL (ref 0.2–1)
BUN SERPL-MCNC: 15 MG/DL (ref 5–25)
CALCIUM SERPL-MCNC: 9 MG/DL (ref 8.3–10.1)
CARDIAC TROPONIN I PNL SERPL HS: 3 NG/L
CHLORIDE SERPL-SCNC: 106 MMOL/L (ref 100–108)
CO2 SERPL-SCNC: 25 MMOL/L (ref 21–32)
CREAT SERPL-MCNC: 0.83 MG/DL (ref 0.6–1.3)
EOSINOPHIL # BLD AUTO: 0.27 THOUSAND/ΜL (ref 0–0.61)
EOSINOPHIL NFR BLD AUTO: 5 % (ref 0–6)
ERYTHROCYTE [DISTWIDTH] IN BLOOD BY AUTOMATED COUNT: 13.8 % (ref 11.6–15.1)
FLUAV RNA RESP QL NAA+PROBE: NEGATIVE
FLUBV RNA RESP QL NAA+PROBE: NEGATIVE
GFR SERPL CREATININE-BSD FRML MDRD: 74 ML/MIN/1.73SQ M
GLUCOSE SERPL-MCNC: 113 MG/DL (ref 65–140)
GLUCOSE SERPL-MCNC: 119 MG/DL (ref 65–140)
HCT VFR BLD AUTO: 36.3 % (ref 34.8–46.1)
HGB BLD-MCNC: 11.5 G/DL (ref 11.5–15.4)
IMM GRANULOCYTES # BLD AUTO: 0.01 THOUSAND/UL (ref 0–0.2)
IMM GRANULOCYTES NFR BLD AUTO: 0 % (ref 0–2)
LYMPHOCYTES # BLD AUTO: 0.79 THOUSANDS/ΜL (ref 0.6–4.47)
LYMPHOCYTES NFR BLD AUTO: 14 % (ref 14–44)
MCH RBC QN AUTO: 27.7 PG (ref 26.8–34.3)
MCHC RBC AUTO-ENTMCNC: 31.7 G/DL (ref 31.4–37.4)
MCV RBC AUTO: 88 FL (ref 82–98)
MONOCYTES # BLD AUTO: 0.53 THOUSAND/ΜL (ref 0.17–1.22)
MONOCYTES NFR BLD AUTO: 9 % (ref 4–12)
NEUTROPHILS # BLD AUTO: 4.11 THOUSANDS/ΜL (ref 1.85–7.62)
NEUTS SEG NFR BLD AUTO: 71 % (ref 43–75)
NRBC BLD AUTO-RTO: 0 /100 WBCS
PLATELET # BLD AUTO: 240 THOUSANDS/UL (ref 149–390)
PMV BLD AUTO: 10.6 FL (ref 8.9–12.7)
POTASSIUM SERPL-SCNC: 4.5 MMOL/L (ref 3.5–5.3)
PROT SERPL-MCNC: 7.3 G/DL (ref 6.4–8.2)
RBC # BLD AUTO: 4.15 MILLION/UL (ref 3.81–5.12)
RSV RNA RESP QL NAA+PROBE: NEGATIVE
SARS-COV-2 RNA RESP QL NAA+PROBE: NEGATIVE
SODIUM SERPL-SCNC: 140 MMOL/L (ref 136–145)
WBC # BLD AUTO: 5.75 THOUSAND/UL (ref 4.31–10.16)

## 2022-07-17 PROCEDURE — 85025 COMPLETE CBC W/AUTO DIFF WBC: CPT | Performed by: EMERGENCY MEDICINE

## 2022-07-17 PROCEDURE — 99285 EMERGENCY DEPT VISIT HI MDM: CPT

## 2022-07-17 PROCEDURE — 71045 X-RAY EXAM CHEST 1 VIEW: CPT

## 2022-07-17 PROCEDURE — 80053 COMPREHEN METABOLIC PANEL: CPT | Performed by: EMERGENCY MEDICINE

## 2022-07-17 PROCEDURE — 99284 EMERGENCY DEPT VISIT MOD MDM: CPT | Performed by: EMERGENCY MEDICINE

## 2022-07-17 PROCEDURE — 96365 THER/PROPH/DIAG IV INF INIT: CPT

## 2022-07-17 PROCEDURE — G1004 CDSM NDSC: HCPCS

## 2022-07-17 PROCEDURE — 93005 ELECTROCARDIOGRAM TRACING: CPT

## 2022-07-17 PROCEDURE — 82948 REAGENT STRIP/BLOOD GLUCOSE: CPT

## 2022-07-17 PROCEDURE — 96375 TX/PRO/DX INJ NEW DRUG ADDON: CPT

## 2022-07-17 PROCEDURE — 74176 CT ABD & PELVIS W/O CONTRAST: CPT

## 2022-07-17 PROCEDURE — 0241U HB NFCT DS VIR RESP RNA 4 TRGT: CPT | Performed by: EMERGENCY MEDICINE

## 2022-07-17 PROCEDURE — 84484 ASSAY OF TROPONIN QUANT: CPT | Performed by: EMERGENCY MEDICINE

## 2022-07-17 PROCEDURE — 36415 COLL VENOUS BLD VENIPUNCTURE: CPT | Performed by: EMERGENCY MEDICINE

## 2022-07-17 PROCEDURE — 96361 HYDRATE IV INFUSION ADD-ON: CPT

## 2022-07-17 RX ORDER — AZITHROMYCIN 250 MG/1
500 TABLET, FILM COATED ORAL ONCE
Status: COMPLETED | OUTPATIENT
Start: 2022-07-17 | End: 2022-07-17

## 2022-07-17 RX ORDER — DOXYCYCLINE HYCLATE 100 MG/1
100 CAPSULE ORAL 2 TIMES DAILY
Qty: 10 CAPSULE | Refills: 0 | Status: SHIPPED | OUTPATIENT
Start: 2022-07-17 | End: 2022-07-22

## 2022-07-17 RX ORDER — ONDANSETRON 2 MG/ML
4 INJECTION INTRAMUSCULAR; INTRAVENOUS ONCE
Status: COMPLETED | OUTPATIENT
Start: 2022-07-17 | End: 2022-07-17

## 2022-07-17 RX ORDER — CEFTRIAXONE 1 G/50ML
1000 INJECTION, SOLUTION INTRAVENOUS ONCE
Status: COMPLETED | OUTPATIENT
Start: 2022-07-17 | End: 2022-07-17

## 2022-07-17 RX ORDER — AMOXICILLIN 500 MG/1
1000 CAPSULE ORAL EVERY 8 HOURS SCHEDULED
Qty: 30 CAPSULE | Refills: 0 | Status: SHIPPED | OUTPATIENT
Start: 2022-07-17 | End: 2022-07-22

## 2022-07-17 RX ADMIN — ONDANSETRON 4 MG: 2 INJECTION INTRAMUSCULAR; INTRAVENOUS at 14:07

## 2022-07-17 RX ADMIN — CEFTRIAXONE 1000 MG: 1 INJECTION, SOLUTION INTRAVENOUS at 15:38

## 2022-07-17 RX ADMIN — SODIUM CHLORIDE 1000 ML: 0.9 INJECTION, SOLUTION INTRAVENOUS at 13:59

## 2022-07-17 RX ADMIN — AZITHROMYCIN MONOHYDRATE 500 MG: 250 TABLET ORAL at 16:17

## 2022-07-17 NOTE — DISCHARGE INSTRUCTIONS
Drink lots of fluids  Take the prescribed antibiotics for the next 5 days  If you are having worsening cough, shortness of breath, chest pain persistent nausea and vomiting despite antibiotics, return to the emergency department  Follow-up with your primary care provider in the next week for reassessment

## 2022-07-18 ENCOUNTER — TELEPHONE (OUTPATIENT)
Dept: BEHAVIORAL/MENTAL HEALTH CLINIC | Facility: CLINIC | Age: 67
End: 2022-07-18

## 2022-07-18 ENCOUNTER — TELEMEDICINE (OUTPATIENT)
Dept: PSYCHIATRY | Facility: CLINIC | Age: 67
End: 2022-07-18
Payer: COMMERCIAL

## 2022-07-18 DIAGNOSIS — F41.1 GENERALIZED ANXIETY DISORDER: ICD-10-CM

## 2022-07-18 DIAGNOSIS — F51.05 INSOMNIA RELATED TO ANOTHER MENTAL DISORDER: ICD-10-CM

## 2022-07-18 DIAGNOSIS — F33.41 MAJOR DEPRESSIVE DISORDER, RECURRENT, IN PARTIAL REMISSION (HCC): Primary | ICD-10-CM

## 2022-07-18 PROCEDURE — 99214 OFFICE O/P EST MOD 30 MIN: CPT | Performed by: NURSE PRACTITIONER

## 2022-07-18 NOTE — PSYCH
This note was not shared with the patient due to privacy exception: note includes other individuals    Scott      Name and Date of Birth:  Kierra Chao 73 y o  1955    Date of Visit: 07/18/22      Kierra Chao was seen today for medication management and brief psychotherapy  Virtual Regular Visit    After connecting through [employer], this patient was identified by name and date of birth, was informed that this is a telemedicine visit, and that it is being conducted through [facility], which this patient was informed is a secure HIPPA-compliant platform; this patient agreed to proceed  During this visit, I was alone in my office and my office door was closed  This patient acknowledged consent and understanding of privacy and security of this video platform  This patient gives consent to continue and knows that She can discontinue the visit at any time  Pt understands that this virtual appointment is a billable service  Pt verifies that She is physically located in [place], the [facility] in which I am licensed as an APN with prescriptive authority        Time spent on psychotherapy: 10 minutes    Treatment modality:   Medication management   Medication education  Supportive psychotherapy  Encouraged and reinforced adaptive behavior        SUBJECTIVE: pt reports taking psychiatric medications as prescribed, denies side effects  Reports "excellent" moods  Is on a diet plan that has decreased her appetite and has lost about 8 lbs, she intends to be on it until the endo of August and reach her goal weight of 150 lbs  Sleeping a lot for a while, sleeps for 7 5 hours and gets up to her alarm, then goes back to sleep for two hours  Pt was dx with pneumonia in the ED and is taking an antibiotic, has felt fatigue for a while   Mother had to go to the ED because her dog made her fall, pt was able to deal with supporting her mother without becoming over-emotional  Now realizes that her mother did the best she did raising pt, and pt doesn't resent her anymore  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, it's [employer] in [place], and is part of [employer]; pt had a conflict with one of the ladies there and was able to let it roll over her back and realized ti wasn't herself, and the woman us now responding to her in a positive way  Pt realizes it isn't always about herself  Actively listened to pt  Offered validation of pt's emotions      4/26/3033: taking duloxetine and quetiapine as prescribed, denies side effects  Doing excellent, feels she is more control of her moods they don't control her  Feeling better about herself, thinks she has made very good progress in psychotherapy  Not saying she is sorry for the way that she feels, also not running off to her mother when she calls  Sleeps 9 pm to 5-5:30 am without trazodone, so she hasn't been taking it  Appetite on -  tries to watch what she eats and can lose a pound but not two, has two desert cookies after dinner, the serving size is four cookies and she figures that is ok  Better physically, still taking antibiotics for bronchitis  Just finished PT today  Earnstine Petite sleeping well using a CPAP and is not as grouchy, he hasn't always understood her psychiatric hospitalizations  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, gardening  Explained effect of sugar and carbohydrates on blood sugar and weight   Continue current medications/doses:                duloxetine 60 mg capsule - take 1 capsule by mouth every day               duloxetine 30 mg capsule - take 1 capsule by mouth every day               Note: total daily dose of duloxetine  = 90 mg              quetiapine 100 mg tablet - take 1 tablet by mouth daily at bedtime  Continue to see [address] [place] [id number]) for psychotherapy         She denies suicidal ideation, intent or plan at present, has no suicidal ideation, intent or plan at present  She denies any auditory hallucinations and visual hallucinations, denies any other delusional thinking, denies any delusional thinking  She denies any side effects from medications      HPI ROS Appetite Changes and Sleep:   Appetite - normal    Sleep - normal    Review Of Systems:      Constitutional Negative   ENT Negative   Cardiovascular Negative   Respiratory Negative   Gastrointestinal Negative   Genitourinary Negative   Musculoskeletal Negative   Integumentary Negative   Neurological Negative   Endocrine Negative   Other Symptoms Negative and None       Laboratory Results: No results found for this or any previous visit      Substance Abuse History:    Social History     Substance and Sexual Activity   Drug Use No       Family Psychiatric History:     Family History   Problem Relation Age of Onset    Heart disease Mother     Stroke Mother 58    COPD Mother     Arthritis Mother     Hypertension Father     Kidney disease Brother         kidney transplant    Multiple sclerosis Sister     No Known Problems Maternal Aunt     No Known Problems Maternal Uncle     No Known Problems Paternal Aunt     No Known Problems Paternal Uncle     No Known Problems Maternal Grandmother     No Known Problems Maternal Grandfather     No Known Problems Paternal Grandmother     No Known Problems Paternal Grandfather     Dislocations Sister     Neurological problems Sister     Scoliosis Sister     ADD / ADHD Neg Hx     Anesthesia problems Neg Hx     Cancer Neg Hx     Clotting disorder Neg Hx     Collagen disease Neg Hx     Diabetes Neg Hx     Dislocations Neg Hx     Learning disabilities Neg Hx     Neurological problems Neg Hx     Osteoporosis Neg Hx     Rheumatologic disease Neg Hx     Scoliosis Neg Hx     Vascular Disease Neg Hx        The following portions of the patient's history were reviewed and updated as appropriate: past family history, past medical history, past social history, past surgical history and problem list     Social History     Socioeconomic History    Marital status: /Civil Union     Spouse name: Not on file    Number of children: Not on file    Years of education: Not on file    Highest education level: Not on file   Occupational History    Not on file   Tobacco Use    Smoking status: Never Smoker    Smokeless tobacco: Never Used   Vaping Use    Vaping Use: Never used   Substance and Sexual Activity    Alcohol use: No    Drug use: No    Sexual activity: Not Currently   Other Topics Concern    Not on file   Social History Narrative    Not on file     Social Determinants of Health     Financial Resource Strain: Not on file   Food Insecurity: Not on file   Transportation Needs: Not on file   Physical Activity: Not on file   Stress: Not on file   Social Connections: Not on file   Intimate Partner Violence: Not on file   Housing Stability: Not on file     Social History     Social History Narrative    Not on file        Social History     Tobacco History     Smoking Status  Never Smoker    Smokeless Tobacco Use  Never Used          Alcohol History     Alcohol Use Status  No          Drug Use     Drug Use Status  No          Sexual Activity     Sexually Active  Not Currently          Activities of Daily Living    Not Asked                     OBJECTIVE:     Mental Status Evaluation:    Appearance age appropriate, casually dressed   Behavior pleasant, cooperative   Speech normal rate, rhythm, volume   Mood euthymic   Affect Normal range and intensity, appropriate   Thought Processes Organized, goal-directed   Associations intact associations   Thought Content No overt delusions   Perceptual Disturbances: No auditory hallucinations, no visual hallucinations   Abnormal Thoughts  Risk Potential Suicidal ideation - None  Homicidal ideation - None  Potential for aggression - No   Orientation oriented to person, place, date and situation   Memory recent and remote memory grossly intact   Consciousness alert and awake   Attention Span attention span and concentration are age appropriate   Intellect Not formally assessed   Insight intact   Judgement intact    Muscle Strength and  Gait muscle strength and tone were normal, gait normal   Language no difficulty naming common objects, repeating a phrase   Fund of Knowledge displays adequate knowledge of current events, past history, vocabulary               The patient's chart was reviewed for relevant lab reports and recent encounters with other providers  Medications, treatment progress, and treatment plan enacted on 4/26/2022 were reviewed with the patient     Current treatment plan is due for review/update on NLT 10/26/2022    Treatment plan goals discussed in this encounter: continue improved control of depression, ultimate goal no hospitalization          Assessment/Plan:       Diagnoses and all orders for this visit:    Major depressive disorder, recurrent, in partial remission ([facility] [address] )    Generalized anxiety disorder    Insomnia related to another mental disorder          Treatment Recommendations/Precautions:    Continue current medications/doses:                duloxetine 60 mg capsule - take 1 capsule by mouth every day               duloxetine 30 mg capsule - take 1 capsule by mouth every day               Note: total daily dose of duloxetine  = 90 mg              quetiapine 100 mg tablet - take 1 tablet by mouth daily at bedtime        Continue to see Ruben Maya LCSW ([facility] [place]) for psychotherapy        Risks/Benefits       Risks, Benefits And Possible Side Effects Of Medications:     Risks, benefits, and possible side effects of medications explained to patient and patient verbalizes understanding and agreement for treatment      Controlled Medication Discussion:      Not applicable

## 2022-07-18 NOTE — ED PROVIDER NOTES
History  Chief Complaint   Patient presents with    Weakness - Generalized     Arrives hyperventilating, lying with upper body on registration desk  States she was shopping at [address] [place] [address] and her eyes felt heavy and she felt very weak and nauseous  Staff at [facility] wanted to call a ambulance but she drove self to ED " I don't know how I made it ! "   Color pink, skin is warm and dry  States she had fallen 2 weeks ago off deck and did not have xrays, states she is having back spasms  Encouraged to relax  HPI  Patient is a 60-year-old female history of anxiety, depression, fibromyalgia presenting for evaluation of generalized weakness, episode of near-syncope  Patient states she has been having bilateral lower back pain and spasm for about the past 2 weeks after fall  Patient denies leg weakness, leg numbness, urinary or bowel incontinence, urinary retention  Patient denies dysuria, urinary frequency, hematuria  Patient states that she was walking at the grocery store immediately prior to coming to the emergency department, felt that the back spasm worsened followed by lightheadedness, generalized weakness, nausea  Patient did not pass out or strike her head  Patient states that symptoms improved after few minutes however continues to feel somewhat weak and anxious  Patient denies any prior similar episodes  Patient denies associated chest pain, shortness of breath, palpitations, diaphoresis  Patient denies prior cardiac history  Patient states that she has been having a minimally productive cough for about the past 2 weeks  Patient denies fevers, chills, denies recent travel or sick contacts  Prior to Admission Medications   Prescriptions Last Dose Informant Patient Reported? Taking?    ALPRAZolam (XANAX) 0 5 mg tablet   Yes No   Sig: Take 0 5 mg by mouth 2 (two) times a day as needed   DULoxetine (CYMBALTA) 30 mg delayed release capsule   No No   Sig: Take 1 capsule (30 mg total) by mouth in the morning  DULoxetine (CYMBALTA) 60 mg delayed release capsule   No No   Sig: Take 1 capsule (60 mg total) by mouth in the morning  L-Methylfolate-Algae-B12-B6 (Metanx) 3-90 314-2-35 MG CAPS   No No   Sig: Take 1 tablet by mouth 2 (two) times a day   Magnesium 400 MG TABS   Yes No   Sig: Take by mouth daily   Promethazine-DM (PHENERGAN-DM) 6 25-15 mg/5 mL oral syrup   No No   Sig: Take 5 mL by mouth as needed in the morning and 5 mL as needed at noon and 5 mL as needed in the evening and 5 mL as needed before bedtime for cough     Patient not taking: Reported on 6/27/2022   QUEtiapine (SEROquel) 100 mg tablet   No No   Sig: Take 1 tablet (100 mg total) by mouth daily at bedtime   Sodium Fluoride 5000 Sensitive 1 1-5 % GEL   Yes No   Sig: As dir   albuterol (ProAir HFA) 90 mcg/act inhaler   No No   Sig: Inhale 2 puffs every 6 (six) hours as needed for wheezing   atorvastatin (LIPITOR) 20 mg tablet   No No   Sig: Take 1 tablet (20 mg total) by mouth daily   baclofen 10 mg tablet   Yes No   Sig: Every 12 hours   calcium carbonate (OS-WILLY) 1250 (500 Ca) MG tablet   Yes No   Sig: Take 1,250 mg by mouth   gabapentin (NEURONTIN) 300 mg capsule   No No   Sig: Take 2 capsules (600 mg total) by mouth 2 (two) times a day   levothyroxine 50 mcg tablet   No No   Sig: TAKE 1 TABLET BY MOUTH EVERY DAY   metoprolol succinate (TOPROL-XL) 25 mg 24 hr tablet   Yes No   Sig: Take 25 mg by mouth daily   midodrine (PROAMATINE) 10 MG tablet   No No   Sig: TAKE 1 TABLET BY MOUTH THREE TIMES A DAY   pantoprazole (PROTONIX) 40 mg tablet   No No   Sig: TAKE 1 TABLET BY MOUTH EVERY DAY      Facility-Administered Medications: None       Past Medical History:   Diagnosis Date    Abdominal adhesions     Anesthesia complication     difficult to wake up    Anxiety     Arthritis     Cancer ([facility] [address] )     melanoma    Depression     Depression     Disease of thyroid gland     Fibromyalgia     Fibromyalgia, primary     Fractures 2006    GERD (gastroesophageal reflux disease)     History of cholecystectomy 09/07/2019    Hyperlipidemia     Irregular heart beat     can be tachy; also has orthoststic hypotension    Lazy eye     resolved: 3/27/17    Medical clearance for psychiatric admission 07/13/2021    Melanoma ([facility] [address] ) 2010    Osteoarthritis 07/2018    Osteopenia     with joint pain-elevated DEV    Psychiatric disorder     Shortness of breath     assoc with tachycardia    Stomach disorder 1995    Tinnitus     Wears glasses     for reading       Past Surgical History:   Procedure Laterality Date    ABDOMINAL SURGERY      lysis of adhesions x 2    APPENDECTOMY      CERVICAL FUSION      May 5,2021 and Jan 01,2020    CHOLECYSTECTOMY      open    COLONOSCOPY      DILATION AND CURETTAGE OF UTERUS      FOOT SURGERY  05/2017    NECK SURGERY  01/2019 5/2021    NEUROMA EXCISION Right 05/26/2017    Procedure: EXCISION MASS / FIBROMA FOOT;  Surgeon: Levi Car DPM;  Location: [address] [place] [address];  Service:    Cordella Scrape PLANA VITRECTOMY W/ REPAIR OF MACULAR HOLE  12/23/2020    HI XCAPSL CTRC RMVL INSJ IO LENS PROSTH W/O ECP Left 12/06/2021    Procedure: EXTRACTION EXTRACAPSULAR CATARACT PHACO INTRAOCULAR LENS (IOL);   Surgeon: Adrian Verma MD;  Location: [facility] MAIN OR;  Service: Ophthalmology    RIGHT OOPHORECTOMY      WISDOM TOOTH EXTRACTION      x4       Family History   Problem Relation Age of Onset    Heart disease Mother     Stroke Mother 58    COPD Mother     Arthritis Mother     Hypertension Father     Kidney disease Brother         kidney transplant    Multiple sclerosis Sister     No Known Problems Maternal Aunt     No Known Problems Maternal Uncle     No Known Problems Paternal Aunt     No Known Problems Paternal Uncle     No Known Problems Maternal Grandmother     No Known Problems Maternal Grandfather     No Known Problems Paternal Grandmother     No Known Problems Paternal Grandfather     Dislocations Sister    Juanita Dawkins Neurological problems Sister     Scoliosis Sister     ADD / ADHD Neg Hx     Anesthesia problems Neg Hx     Cancer Neg Hx     Clotting disorder Neg Hx     Collagen disease Neg Hx     Diabetes Neg Hx     Dislocations Neg Hx     Learning disabilities Neg Hx     Neurological problems Neg Hx     Osteoporosis Neg Hx     Rheumatologic disease Neg Hx     Scoliosis Neg Hx     Vascular Disease Neg Hx      I have reviewed and agree with the history as documented  E-Cigarette/Vaping    E-Cigarette Use Never User      E-Cigarette/Vaping Substances    Nicotine No     THC No     CBD No     Flavoring No     Other No     Unknown No      Social History     Tobacco Use    Smoking status: Never Smoker    Smokeless tobacco: Never Used   Vaping Use    Vaping Use: Never used   Substance Use Topics    Alcohol use: No    Drug use: No       Review of Systems   Constitutional: Negative for chills, fatigue and fever  HENT: Negative for sore throat  Eyes: Negative for visual disturbance  Respiratory: Positive for cough  Negative for chest tightness and shortness of breath  Cardiovascular: Negative for chest pain  Gastrointestinal: Negative for abdominal distention, abdominal pain, constipation, diarrhea, nausea and vomiting  Endocrine: Negative for polydipsia and polyuria  Genitourinary: Negative for dysuria and hematuria  Musculoskeletal: Positive for back pain  Negative for arthralgias, gait problem and myalgias  Skin: Negative for color change and rash  Neurological: Positive for weakness (Generalized) and light-headedness  Negative for dizziness and headaches  Psychiatric/Behavioral: Negative for confusion  All other systems reviewed and are negative  Physical Exam  Physical Exam  Vitals reviewed  Constitutional:       General: She is not in acute distress  Appearance: She is well-developed  She is not diaphoretic        Comments: Anxious appearing but nondistressed   HENT: Head: Normocephalic and atraumatic  Comments: Moist mucous membranes  Normal-appearing oropharynx  Right Ear: External ear normal       Left Ear: External ear normal       Nose: Nose normal       Mouth/Throat:      Pharynx: No oropharyngeal exudate  Eyes:      Pupils: Pupils are equal, round, and reactive to light  Cardiovascular:      Rate and Rhythm: Normal rate and regular rhythm  Heart sounds: Normal heart sounds  No murmur heard  No friction rub  No gallop  Comments: Regular rate and rhythm, no murmurs rubs or gallops  Extremities warm and well perfused  Pulmonary:      Effort: Pulmonary effort is normal  No respiratory distress  Breath sounds: Normal breath sounds  No wheezing  Comments: No increased work of breathing  Speaking complete sentences  Satting 98% on room air indicating adequate oxygenation  Lungs clear to auscultation bilaterally without wheezes, rales, rhonchi  Chest:      Chest wall: No tenderness  Abdominal:      General: Bowel sounds are normal  There is no distension  Palpations: Abdomen is soft  There is no mass  Tenderness: There is abdominal tenderness  There is no guarding  Comments: Moderate right lower quadrant tenderness with voluntary guarding  Musculoskeletal:         General: No deformity  Normal range of motion  Cervical back: Normal range of motion  Lymphadenopathy:      Cervical: No cervical adenopathy  Skin:     General: Skin is warm and dry  Capillary Refill: Capillary refill takes less than 2 seconds  Neurological:      Mental Status: She is alert and oriented to person, place, and time        Comments: AAO x4         Vital Signs  ED Triage Vitals [07/17/22 1240]   Temperature Pulse Respirations Blood Pressure SpO2   (!) 97 °F (36 1 °C) 92 (!) 24 170/88 99 %      Temp Source Heart Rate Source Patient Position - Orthostatic VS BP Location FiO2 (%)   Tympanic Monitor Sitting Left arm --      Pain Score       6           Vitals:    07/17/22 1240 07/17/22 1439 07/17/22 1620   BP: 170/88 (!) 178/94 165/86   Pulse: 92 86    Patient Position - Orthostatic VS: Sitting           Visual Acuity      ED Medications  Medications   ondansetron (ZOFRAN) injection 4 mg (4 mg Intravenous Given 7/17/22 1407)   sodium chloride 0 9 % bolus 1,000 mL (0 mL Intravenous Stopped 7/17/22 1618)   cefTRIAXone (ROCEPHIN) IVPB (premix in dextrose) 1,000 mg 50 mL (0 mg Intravenous Stopped 7/17/22 1656)   azithromycin (ZITHROMAX) tablet 500 mg (500 mg Oral Given 7/17/22 1617)       Diagnostic Studies  Results Reviewed     Procedure Component Value Units Date/Time    COVID/FLU/RSV [[id number]]  (Normal) Collected: 07/17/22 1342    Lab Status: Final result Specimen: Nares from Nasopharyngeal Swab Updated: 07/17/22 1427     SARS-CoV-2 Negative     INFLUENZA A PCR Negative     INFLUENZA B PCR Negative     RSV PCR Negative    Narrative:      FOR PEDIATRIC PATIENTS - copy/paste COVID Guidelines URL to browser: [email or web address]    SARS-CoV-2 assay is a Nucleic Acid Amplification assay intended for the  qualitative detection of nucleic acid from SARS-CoV-2 in nasopharyngeal  swabs  Results are for the presumptive identification of SARS-CoV-2 RNA  Positive results are indicative of infection with SARS-CoV-2, the virus  causing COVID-19, but do not rule out bacterial infection or co-infection  with other viruses  Laboratories within the [place] and its  territories are required to report all positive results to the appropriate  public health authorities  Negative results do not preclude SARS-CoV-2  infection and should not be used as the sole basis for treatment or other  patient management decisions  Negative results must be combined with  clinical observations, patient history, and epidemiological information  This test has not been FDA cleared or approved      This test has been authorized by FDA under an Emergency Use Authorization  (EUA)  This test is only authorized for the duration of time the  declaration that circumstances exist justifying the authorization of the  emergency use of an in vitro diagnostic tests for detection of SARS-CoV-2  virus and/or diagnosis of COVID-19 infection under section 564(b)(1) of  the Act, 21 U  S C  428CZG-1(B)(2), unless the authorization is terminated  or revoked sooner  The test has been validated but independent review by FDA  and CLIA is pending  Test performed using [employer] Gene[employer]pert: This RT-PCR assay targets N2,  a region unique to SARS-CoV-2  A conserved region in the E-gene was chosen  for pan-Sarbecovirus detection which includes SARS-CoV-2      HS Troponin 0hr (reflex protocol) [[id number]]  (Normal) Collected: 07/17/22 1358    Lab Status: Final result Specimen: Blood from Arm, Right Updated: 07/17/22 1426     hs TnI 0hr 3 ng/L     Comprehensive metabolic panel [[id number]] Collected: 07/17/22 1358    Lab Status: Final result Specimen: Blood from Arm, Right Updated: 07/17/22 1417     Sodium 140 mmol/L      Potassium 4 5 mmol/L      Chloride 106 mmol/L      CO2 25 mmol/L      ANION GAP 9 mmol/L      BUN 15 mg/dL      Creatinine 0 83 mg/dL      Glucose 119 mg/dL      Calcium 9 0 mg/dL      AST 27 U/L      ALT 33 U/L      Alkaline Phosphatase 76 U/L      Total Protein 7 3 g/dL      Albumin 3 5 g/dL      Total Bilirubin 0 40 mg/dL      eGFR 74 ml/min/1 73sq m     Narrative:      [place] guidelines for Chronic Kidney Disease (CKD):     Stage 1 with normal or high GFR (GFR > 90 mL/min/1 73 square meters)    Stage 2 Mild CKD (GFR = 60-89 mL/min/1 73 square meters)    Stage 3A Moderate CKD (GFR = 45-59 mL/min/1 73 square meters)    Stage 3B Moderate CKD (GFR = 30-44 mL/min/1 73 square meters)    Stage 4 Severe CKD (GFR = 15-29 mL/min/1 73 square meters)    Stage 5 End Stage CKD (GFR <15 mL/min/1 73 square meters)  Note: GFR calculation is accurate only with a steady state creatinine    CBC and differential [[id number]] Collected: 07/17/22 1358    Lab Status: Final result Specimen: Blood from Arm, Right Updated: 07/17/22 1405     WBC 5 75 Thousand/uL      RBC 4 15 Million/uL      Hemoglobin 11 5 g/dL      Hematocrit 36 3 %      MCV 88 fL      MCH 27 7 pg      MCHC 31 7 g/dL      RDW 13 8 %      MPV 10 6 fL      Platelets 161 Thousands/uL      nRBC 0 /100 WBCs      Neutrophils Relative 71 %      Immat GRANS % 0 %      Lymphocytes Relative 14 %      Monocytes Relative 9 %      Eosinophils Relative 5 %      Basophils Relative 1 %      Neutrophils Absolute 4 11 Thousands/µL      Immature Grans Absolute 0 01 Thousand/uL      Lymphocytes Absolute 0 79 Thousands/µL      Monocytes Absolute 0 53 Thousand/µL      Eosinophils Absolute 0 27 Thousand/µL      Basophils Absolute 0 04 Thousands/µL     Fingerstick Glucose (POCT) [[id number]]  (Normal) Collected: 07/17/22 1311    Lab Status: Final result Updated: 07/17/22 1311     POC Glucose 113 mg/dl                  CT abdomen pelvis wo contrast   Final Result by Natasha Casey MD (07/17 1439)         1  Large amount of stool in the colon  No inflammatory changes or bowel obstruction  2   Nonspecific groundglass density in the left lower lobe likely of infectious or inflammatory etiology  Workstation performed: [id number]         XR chest 1 view portable   Final Result by Eugene Vo MD (07/17 1349)      No acute cardiopulmonary disease  Workstation performed: [id number]                    Procedures  Procedures         ED Course                               SBIRT 22yo+    Flowsheet Row Most Recent Value   SBIRT (25 yo +)    In order to provide better care to our patients, we are screening all of our patients for alcohol and drug use  Would it be okay to ask you these screening questions?  No Filed at: 07/17/2022 5405 MDM  Number of Diagnoses or Management Options  Community acquired pneumonia  Generalized weakness  Diagnosis management comments: Given patient's age, comorbidities, near syncopal symptoms, cardiac workup including CBC, CMP, troponin, EKG, chest x-ray performed  Patient with trop of 3, no leukocytosis on anemia  Normal sinus rhythm without ST or T-wave abnormalities on EKG  COVID testing negative  CT chest abdomen pelvis significant only for large stool burden in area of patient's tenderness and ground-glass opacity in lung  Given patient's cough, constitutional symptoms, treated for community-acquired pneumonia with azithromycin, Rocephin, prescribed course of amoxicillin, doxycycline, discharged with verbal and written return precautions  Disposition  Final diagnoses:   Generalized weakness   Community acquired pneumonia     Time reflects when diagnosis was documented in both MDM as applicable and the Disposition within this note     Time User Action Codes Description Comment    7/17/2022  3:10 PM Beatriz Briggs Add [R53 1] Generalized weakness     7/17/2022  3:11 PM Beatriz Briggs Add [J18 9] Community acquired pneumonia       ED Disposition     ED Disposition   Discharge    Condition   Stable    Date/Time   Sun Jul 17, 2022  3:10 PM    Comment   [place] discharge to home/self care                 Follow-up Information     Follow up With Specialties Details Why Contact Info Additional Information    [facility] Emergency Department Emergency Medicine  If symptoms worsen [address] [id number] [address] [address] Emergency Department, [facility], [address]          Discharge Medication List as of 7/17/2022  3:12 PM      START taking these medications    Details   amoxicillin (AMOXIL) 500 mg capsule Take 2 capsules (1,000 mg total) by mouth every 8 (eight) hours for 5 days, Starting Gregory Negrete 7/17/2022, Until Fri 7/22/2022, Normal      doxycycline hyclate (VIBRAMYCIN) 100 mg capsule Take 1 capsule (100 mg total) by mouth 2 (two) times a day for 5 days, Starting Sun 7/17/2022, Until Fri 7/22/2022, Normal         CONTINUE these medications which have NOT CHANGED    Details   albuterol (ProAir HFA) 90 mcg/act inhaler Inhale 2 puffs every 6 (six) hours as needed for wheezing, Starting Sat 5/21/2022, Normal      ALPRAZolam (XANAX) 0 5 mg tablet Take 0 5 mg by mouth 2 (two) times a day as needed, Starting Sun 5/23/2021, Historical Med      atorvastatin (LIPITOR) 20 mg tablet Take 1 tablet (20 mg total) by mouth daily, Starting Mon 9/13/2021, Normal      baclofen 10 mg tablet Every 12 hours, Historical Med      calcium carbonate (OS-WILLY) 1250 (500 Ca) MG tablet Take 1,250 mg by mouth, Historical Med      !! DULoxetine (CYMBALTA) 30 mg delayed release capsule Take 1 capsule (30 mg total) by mouth in the morning , Starting Mon 5/23/2022, Until Sun 8/21/2022, Normal      !!  DULoxetine (CYMBALTA) 60 mg delayed release capsule Take 1 capsule (60 mg total) by mouth in the morning , Starting Mon 5/23/2022, Normal      gabapentin (NEURONTIN) 300 mg capsule Take 2 capsules (600 mg total) by mouth 2 (two) times a day, Starting Tue 6/7/2022, Normal      L-Methylfolate-Algae-B12-B6 (Metanx) 3-90 314-2-35 MG CAPS Take 1 tablet by mouth 2 (two) times a day, Starting Mon 3/21/2022, Until Wed 7/6/2022, Normal      levothyroxine 50 mcg tablet TAKE 1 TABLET BY MOUTH EVERY DAY, Normal      Magnesium 400 MG TABS Take by mouth daily, Historical Med      metoprolol succinate (TOPROL-XL) 25 mg 24 hr tablet Take 25 mg by mouth daily, Starting Fri 2/25/2022, Historical Med      midodrine (PROAMATINE) 10 MG tablet TAKE 1 TABLET BY MOUTH THREE TIMES A DAY, Normal      pantoprazole (PROTONIX) 40 mg tablet TAKE 1 TABLET BY MOUTH EVERY DAY, Normal      Promethazine-DM (PHENERGAN-DM) 6 25-15 mg/5 mL oral syrup Take 5 mL by mouth as needed in the morning and 5 mL as needed at noon and 5 mL as needed in the evening and 5 mL as needed before bedtime for cough  , Starting Wed 5/18/2022, Normal      QUEtiapine (SEROquel) 100 mg tablet Take 1 tablet (100 mg total) by mouth daily at bedtime, Starting Mon 5/23/2022, Until Sun 8/21/2022, Normal      Sodium Fluoride 5000 Sensitive 1 1-5 % GEL As dir, Starting Tue 11/9/2021, Historical Med       !! - Potential duplicate medications found  Please discuss with provider  No discharge procedures on file      PDMP Review       Value Time User    PDMP Reviewed  Yes 9/13/2021  6:56 PM YURI Grady          ED Provider  Electronically Signed by           Landon Monterroso MD  07/18/22 0723

## 2022-07-26 ENCOUNTER — OFFICE VISIT (OUTPATIENT)
Dept: FAMILY MEDICINE CLINIC | Facility: CLINIC | Age: 67
End: 2022-07-26
Payer: COMMERCIAL

## 2022-07-26 VITALS
WEIGHT: 164 LBS | DIASTOLIC BLOOD PRESSURE: 76 MMHG | OXYGEN SATURATION: 97 % | HEART RATE: 75 BPM | TEMPERATURE: 98.9 F | BODY MASS INDEX: 27.32 KG/M2 | RESPIRATION RATE: 18 BRPM | SYSTOLIC BLOOD PRESSURE: 108 MMHG | HEIGHT: 65 IN

## 2022-07-26 DIAGNOSIS — J18.9 PNEUMONIA DUE TO INFECTIOUS ORGANISM, UNSPECIFIED LATERALITY, UNSPECIFIED PART OF LUNG: Primary | ICD-10-CM

## 2022-07-26 LAB
ATRIAL RATE: 89 BPM
P AXIS: 62 DEGREES
PR INTERVAL: 168 MS
QRS AXIS: -14 DEGREES
QRSD INTERVAL: 80 MS
QT INTERVAL: 382 MS
QTC INTERVAL: 464 MS
T WAVE AXIS: 27 DEGREES
VENTRICULAR RATE: 89 BPM

## 2022-07-26 PROCEDURE — 93010 ELECTROCARDIOGRAM REPORT: CPT | Performed by: INTERNAL MEDICINE

## 2022-07-26 PROCEDURE — 1160F RVW MEDS BY RX/DR IN RCRD: CPT | Performed by: FAMILY MEDICINE

## 2022-07-26 PROCEDURE — 99213 OFFICE O/P EST LOW 20 MIN: CPT | Performed by: FAMILY MEDICINE

## 2022-07-26 NOTE — PROGRESS NOTES
Assessment/Plan:     Diagnoses and all orders for this visit:    Pneumonia due to infectious organism, unspecified laterality, unspecified part of lung  Comments:  Resolving  She continues to have some mild fatigue and chest discomfort, but all of her other symptoms have resolved  For now continue to monitor  If symptoms worsen, can repeat imaging  Subjective:      Patient ID: Tavon Christiansen is a 73 y o  female  HPI  Alpha Mixer presents today for ER follow up visit after she was seen at [facility] ER on 7/17/22 for generalized weakness  Imaging showed pneumonia and she was treated with amoxicillin and doxycyline  Today she reports that her symptoms are improving  She does have some mild fatigue and chest tightness, but denies fevers/chills, cough, shortness of breath, headaches, dizziness, weakness  The following portions of the patient's history were reviewed and updated as appropriate: allergies, current medications, past family history, past medical history, past social history, past surgical history and problem list     Review of Systems   Constitutional: Positive for fatigue  HENT: Negative  Eyes: Negative  Respiratory: Negative  Cardiovascular: Negative  Gastrointestinal: Negative  Endocrine: Negative  Genitourinary: Negative  Musculoskeletal: Negative  Skin: Negative  Allergic/Immunologic: Negative  Neurological: Negative  Hematological: Negative  Psychiatric/Behavioral: Negative  Objective:      /76   Pulse 75   Temp 98 9 °F (37 2 °C)   Resp 18   Ht 5' 5" (1 651 m)   Wt 74 4 kg (164 lb)   LMP  (LMP Unknown)   SpO2 97%   BMI 27 29 kg/m²          Physical Exam  Constitutional:       General: She is not in acute distress  Appearance: She is well-developed  She is not diaphoretic  HENT:      Head: Normocephalic and atraumatic  Cardiovascular:      Rate and Rhythm: Normal rate and regular rhythm  Heart sounds: Normal heart sounds   No murmur heard  No friction rub  No gallop  Pulmonary:      Effort: Pulmonary effort is normal  No respiratory distress  Breath sounds: Normal breath sounds  No wheezing or rales  Chest:      Chest wall: No tenderness  Musculoskeletal:         General: No deformity  Normal range of motion  Cervical back: Normal range of motion and neck supple  Skin:     General: Skin is warm and dry  Neurological:      Mental Status: She is alert and oriented to person, place, and time  Psychiatric:         Behavior: Behavior normal          Thought Content:  Thought content normal          Judgment: Judgment normal

## 2022-07-29 ENCOUNTER — OFFICE VISIT (OUTPATIENT)
Dept: OBGYN CLINIC | Facility: CLINIC | Age: 67
End: 2022-07-29
Payer: COMMERCIAL

## 2022-07-29 ENCOUNTER — EVALUATION (OUTPATIENT)
Dept: PHYSICAL THERAPY | Facility: CLINIC | Age: 67
End: 2022-07-29
Payer: COMMERCIAL

## 2022-07-29 VITALS
DIASTOLIC BLOOD PRESSURE: 60 MMHG | RESPIRATION RATE: 19 BRPM | HEART RATE: 78 BPM | SYSTOLIC BLOOD PRESSURE: 110 MMHG | HEIGHT: 65 IN | BODY MASS INDEX: 26.82 KG/M2 | WEIGHT: 161 LBS | TEMPERATURE: 98.2 F

## 2022-07-29 DIAGNOSIS — G89.29 CHRONIC PAIN OF RIGHT KNEE: ICD-10-CM

## 2022-07-29 DIAGNOSIS — M48.02 CERVICAL SPINAL STENOSIS: Primary | ICD-10-CM

## 2022-07-29 DIAGNOSIS — M17.11 PRIMARY OSTEOARTHRITIS OF RIGHT KNEE: Primary | ICD-10-CM

## 2022-07-29 DIAGNOSIS — M25.561 CHRONIC PAIN OF RIGHT KNEE: ICD-10-CM

## 2022-07-29 DIAGNOSIS — M54.12 CERVICAL RADICULOPATHY: ICD-10-CM

## 2022-07-29 PROCEDURE — 97140 MANUAL THERAPY 1/> REGIONS: CPT

## 2022-07-29 PROCEDURE — 99214 OFFICE O/P EST MOD 30 MIN: CPT | Performed by: ORTHOPAEDIC SURGERY

## 2022-07-29 PROCEDURE — 97161 PT EVAL LOW COMPLEX 20 MIN: CPT

## 2022-07-29 NOTE — PROGRESS NOTES
Assessment/Plan:  1  Primary osteoarthritis of right knee  Ambulatory Referral to Physical Therapy   2  Chronic pain of right knee  XR knee 3 vw right non injury    Ambulatory Referral to Physical Therapy     Scribe Attestation    I,:  Nissa Bello am acting as a scribe while in the presence of the attending physician :       I,:  Kiera Herrera, DO personally performed the services described in this documentation    as scribed in my presence :         Roderick Jensen is a pleasant 50-year-old female who returns today for follow-up evaluation of her right knee pain  She does appear symptomatic of her early osteoarthritis which is most prominent in the patellofemoral compartment  I recommended initiating physical therapy and provided her with a referral today  I also encouraged her to use Tylenol for symptomatic relief as needed  I would like to see her back in 8 weeks for repeat clinical evaluation  We can consider corticosteroid injection therapy for persistent pain at that time  Subjective: Follow-up evaluation for right knee pain    Patient ID: Brook Camacho is a 73 y o  female who returns today for follow-up evaluation of her right knee pain  Since her last evaluation in November of 2021 she has been seen by her rheumatologist   Her medications were adjusted and she responded well to this  Her diffuse pain has resolved  At today's visit, she complains of activity related pain about the anterior and anteromedial aspect of her knee  She also complains posterior fullness and pain in her hamstrings at times  She has difficulty navigating steps and also complains of start-up stiffness  She denies any recent injury or trauma  Review of Systems   Constitutional: Positive for activity change  Negative for chills, fever and unexpected weight change  HENT: Negative for hearing loss, nosebleeds and sore throat  Eyes: Negative for pain, redness and visual disturbance     Respiratory: Negative for cough, shortness of breath and wheezing  Cardiovascular: Negative for chest pain, palpitations and leg swelling  Gastrointestinal: Negative for abdominal pain, nausea and vomiting  Endocrine: Negative for polydipsia and polyuria  Genitourinary: Negative for dysuria and hematuria  Musculoskeletal: Positive for arthralgias and myalgias  Negative for joint swelling  See HPI   Skin: Negative for rash and wound  Neurological: Negative for dizziness, numbness and headaches  Psychiatric/Behavioral: Negative for decreased concentration and suicidal ideas  The patient is not nervous/anxious            Past Medical History:   Diagnosis Date    Abdominal adhesions     Anesthesia complication     difficult to wake up    Anxiety     Arthritis     Cancer ([facility] [address] )     melanoma    Depression     Depression     Disease of thyroid gland     Fibromyalgia     Fibromyalgia, primary     Fractures 2006    GERD (gastroesophageal reflux disease)     History of cholecystectomy 09/07/2019    Hyperlipidemia     Irregular heart beat     can be tachy; also has orthoststic hypotension    Lazy eye     resolved: 3/27/17    Medical clearance for psychiatric admission 07/13/2021    Melanoma ([facility] [address] ) 2010    Osteoarthritis 07/2018    Osteopenia     with joint pain-elevated DEV    Psychiatric disorder     Shortness of breath     assoc with tachycardia    Stomach disorder 1995    Tinnitus     Wears glasses     for reading       Past Surgical History:   Procedure Laterality Date    ABDOMINAL SURGERY      lysis of adhesions x 2    APPENDECTOMY      CERVICAL FUSION      May 5,2021 and Jan 01,2020    CHOLECYSTECTOMY      open    COLONOSCOPY      DILATION AND CURETTAGE OF UTERUS      FOOT SURGERY  05/2017    NECK SURGERY  01/2019 5/2021    NEUROMA EXCISION Right 05/26/2017    Procedure: EXCISION MASS / FIBROMA FOOT;  Surgeon: Alicja Stahl DPM;  Location: [facility];  Service:    [facility]  12/23/2020    [place] XCAPSL CTRC RMVL INSJ IO LENS PROSTH W/O ECP Left 12/06/2021    Procedure: EXTRACTION EXTRACAPSULAR CATARACT PHACO INTRAOCULAR LENS (IOL);   Surgeon: Garret Lizama MD;  Location: [facility] MAIN OR;  Service: Ophthalmology    RIGHT OOPHORECTOMY      WISDOM TOOTH EXTRACTION      x4       Family History   Problem Relation Age of Onset    Heart disease Mother     Stroke Mother 58    COPD Mother     Arthritis Mother     Hypertension Father     Kidney disease Brother         kidney transplant    Multiple sclerosis Sister     No Known Problems Maternal Aunt     No Known Problems Maternal Uncle     No Known Problems Paternal Aunt     No Known Problems Paternal Uncle     No Known Problems Maternal Grandmother     No Known Problems Maternal Grandfather     No Known Problems Paternal Grandmother     No Known Problems Paternal Grandfather     Dislocations Sister     Neurological problems Sister     Scoliosis Sister     ADD / ADHD Neg Hx     Anesthesia problems Neg Hx     Cancer Neg Hx     Clotting disorder Neg Hx     Collagen disease Neg Hx     Diabetes Neg Hx     Dislocations Neg Hx     Learning disabilities Neg Hx     Neurological problems Neg Hx     Osteoporosis Neg Hx     Rheumatologic disease Neg Hx     Scoliosis Neg Hx     Vascular Disease Neg Hx        Social History     Occupational History    Not on file   Tobacco Use    Smoking status: Never Smoker    Smokeless tobacco: Never Used   Vaping Use    Vaping Use: Never used   Substance and Sexual Activity    Alcohol use: No    Drug use: No    Sexual activity: Not Currently         Current Outpatient Medications:     albuterol (ProAir HFA) 90 mcg/act inhaler, Inhale 2 puffs every 6 (six) hours as needed for wheezing, Disp: 8 5 g, Rfl: 0    ALPRAZolam (XANAX) 0 5 mg tablet, Take 0 5 mg by mouth 2 (two) times a day as needed, Disp: , Rfl:     atorvastatin (LIPITOR) 20 mg tablet, Take 1 tablet (20 mg total) by mouth daily, Disp: 90 tablet, Rfl: 3    baclofen 10 mg tablet, Every 12 hours, Disp: , Rfl:     calcium carbonate (OS-WILLY) 1250 (500 Ca) MG tablet, Take 1,250 mg by mouth, Disp: , Rfl:     DULoxetine (CYMBALTA) 30 mg delayed release capsule, Take 1 capsule (30 mg total) by mouth in the morning , Disp: 90 capsule, Rfl: 0    DULoxetine (CYMBALTA) 60 mg delayed release capsule, Take 1 capsule (60 mg total) by mouth in the morning , Disp: 90 capsule, Rfl: 0    gabapentin (NEURONTIN) 300 mg capsule, Take 2 capsules (600 mg total) by mouth 2 (two) times a day, Disp: 120 capsule, Rfl: 6    levothyroxine 50 mcg tablet, TAKE 1 TABLET BY MOUTH EVERY DAY, Disp: 90 tablet, Rfl: 0    Magnesium 400 MG TABS, Take by mouth daily, Disp: , Rfl:     metoprolol succinate (TOPROL-XL) 25 mg 24 hr tablet, Take 25 mg by mouth daily, Disp: , Rfl:     midodrine (PROAMATINE) 10 MG tablet, TAKE 1 TABLET BY MOUTH THREE TIMES A DAY, Disp: 270 tablet, Rfl: 0    pantoprazole (PROTONIX) 40 mg tablet, TAKE 1 TABLET BY MOUTH EVERY DAY, Disp: 90 tablet, Rfl: 1    QUEtiapine (SEROquel) 100 mg tablet, Take 1 tablet (100 mg total) by mouth daily at bedtime, Disp: 90 tablet, Rfl: 0    Sodium Fluoride 5000 Sensitive 1 1-5 % GEL, As dir, Disp: , Rfl:     Allergies   Allergen Reactions    Meperidine Lightheadedness and Other (See Comments)    Sulfa Antibiotics Hives       Objective:  Vitals:    07/29/22 0820   BP: 110/60   Pulse: 78   Resp: 19   Temp: 98 2 °F (36 8 °C)       Body mass index is 26 79 kg/m²  Right Knee Exam     Tenderness   The patient is experiencing tenderness in the patella  Range of Motion   Right knee extension: 0  Right knee flexion: 135       Tests   Varus: negative Valgus: negative  Drawer:  Anterior - negative    Posterior - negative    Other   Erythema: absent  Scars: absent  Sensation: normal  Pulse: present  Swelling: none  Effusion: no effusion present    Comments:  Stable at 0, 30 and 90 degrees  Neurovascularly in tact distally  No warmth or erythema  Parapatellar crepitance noted  Patellofemoral grind: positive              Observations     Right Knee   Negative for effusion  Physical Exam  Vitals and nursing note reviewed  Constitutional:       Appearance: She is well-developed  HENT:      Head: Normocephalic and atraumatic  Eyes:      General: No scleral icterus  Extraocular Movements: Extraocular movements intact  Conjunctiva/sclera: Conjunctivae normal    Cardiovascular:      Rate and Rhythm: Normal rate  Pulmonary:      Effort: Pulmonary effort is normal  No respiratory distress  Musculoskeletal:      Cervical back: Normal range of motion and neck supple  Right knee: No effusion  Comments: As noted in HPI   Skin:     General: Skin is warm and dry  Neurological:      Mental Status: She is alert and oriented to person, place, and time  Psychiatric:         Behavior: Behavior normal          I have personally reviewed pertinent films in PACS  X-ray of the right knee obtained on 11/05/2021 reviewed demonstrating mild degenerative change most prominent the patellofemoral compartment with narrowing, sclerosis, and subtle osteophytosis  There is no acute fracture, dislocation, lytic or blastic lesion

## 2022-07-29 NOTE — LETTER
2022    [address] [place]    Patient: Fany Moreira   YOB: 1955   Date of Visit: 2022     Encounter Diagnosis     ICD-10-CM    1  Cervical spinal stenosis  M48 02    2  Cervical radiculopathy  M54 12        Dear Dr Walter Light: Thank you for your recent referral of Fany Moreira  Please review the attached evaluation summary from Ina's recent visit  Please verify that you agree with the plan of care by signing the attached order  If you have any questions or concerns, please do not hesitate to call  I sincerely appreciate the opportunity to share in the care of one of your patients and hope to have another opportunity to work with you in the near future  Sincerely,    Walter Osman      Referring Provider:      I certify that I have read the below Plan of Care and certify the need for these services furnished under this plan of treatment while under my care  [address] [place] [address]  Via Fax: [phone]              PT Evaluation   Today's date: 2022  Patient name: Fany Moreira  : 1955  MRN: [id number]  Referring provider: Ap Busby  Dx:   Encounter Diagnosis     ICD-10-CM    1  Cervical spinal stenosis  M48 02    2  Cervical radiculopathy  M54 12        Assessment:    Fany Moreira is a pleasant 73 y o  female who presents with cervical spinal stenosis and Lt sided cervical radiculopathy  Her primary movement system diagnosis is neck pain with radiculopathy and nociceptive pain resulting in pathoanatomical symptoms of decreased cervical range of motion, decreased cervical segmental mobility, decreased DNF endurance, and hypertonicity of suboccipitals  The aforementioned impairments have limited the patient's ability to perform recreational activities without pain and drive safely   No further referral is neccessary at this time based upon examination results  Positive prognostic indicators include motivation to improve and positive attitude  Negative prognostic indicators include chronicity of symptoms and prior failed treatment  Impairments: abnormal coordination, abnormal muscle firing, abnormal or restricted ROM, Impaired physical strength, lacks appropriate HEP, pain with function, poor posture and poor body mechanics    Symptom Irritability: high    Problem List:  - decreased cervical range of motion - therapeutic exercise, therapeutic activity, neuromuscular re-education and functional mobility  - decreased DNF endurance - therapeutic exercise, therapeutic activity, neuromuscular re-education, strengthening and functional mobility   - hypertonicity of suboccipitals - therapeutic exercise, therapeutic activity, neuromuscular re-education, strengthening and functional mobility     Patient education performed this session included: home exercise program, plan of care and prognosis and diagnosis    Patient verbalized good understanding of POC  Please contact me if you have any questions or recommendations  Thank you for the referral and the opportunity to share in [facility] care  Plan:     Patient would benefit from: Skilled PT  Planned modality interventions: biofeedback and thermotherapy (hot pack)  Planned therapy interventions: behavior modification, body mechanics training, breathing training, flexibility, functional ROM exercises, HEP, joint mobilization, manual therapy, motor coordination training, neuromuscular re-education, patient education, postural training, strengthening, stretching, therapeutic activities and therapeutic exercises    Frequency: 2x per week  Duration in weeks: 8 weeks    Plan of Care beginning date: 7/29/2022  Plan of Care expiration date: 12 weeks - 10/21/2022  Treatment plan discussed with: patient     History    History of Present Illness  Mechanism of injury: Patient reports a chronic history of neck pain  She has undergone an anterior fusion of C3-C4 and C4-C5 in 2019 and it was extended to C5-C6 in 2021  Patient's primary concern today is lessening range of motion with pain and tenderness to touch along the posterior cervical spine  In addition, she continues to get intermittent radicular pain and numbness into Lt hand  Her primary goal is to improve motion and prevent another surgery       Prior level of function: gardening, heavy housework    Pain  Current: 4/10  At best: 2/10  At worst: 7/10 (few times per week)    Location: both sides (Lt > Rt), wraps around to Lt shoulder and radiates to Lt arm and hand (pinky and ring finger)  Description: burning, radiating/shooting pain into Lt  Aggravating factors: lifting heavy objects  Relieving factors: heat, medications and light activity    Progression: no change    Previous treatment: physical therapy    Patient goals for therapy: decrease pain and increase motion    Social Support  Lives with: spouse    Hand dominance: right    Physical Examination    Red Flag Screening  (+): hx of cancer (melanoma), night sweats, recent infection (bronchitis), tingling into bilateral feet (Lt > Rt)     Postural Assessment  Posture in Sitting: poor  Posture in Standing: poor  Postural Correction: makes symptoms better    Sensation  Light touch: intact, except: posteriolateral Lt arm, forearm, and thumb (C5 and C6 dermatome)  Reflexes:     LEFT  RIGHT  C5: 1+   C5: 2+    C6: 1+   C6: 2+   Upper Motor Neuron Signs: none    UE AROM  Cervical Spine:   Flexion:  Minimally limited  without pain  Extension:  Moderately limited  with pain  Lateral Flexion - Left:  Severely limited  with pain  Lateral Flexion - Right:  Severely limited  with pain  Rotation - Left:  Severely limited  with pain  Rotation - Right:  Moderately limited  with pain    Thoracic Spine:   Flexion:  No limitation  without pain  Extension:  Moderately limited  without pain  Rotation - Left:  Moderately limited  without pain  Rotation - Right:  Moderately limited  without pain     UE PROM     LEFT  RIGHT  (Normal)  Shoulder Flexion: WNL  WNL  (180)  Shoulder Abduction: WNL  WNL  (180)  Shoulder Extension:  WNL  WNL  (60)  Shoulder ER at 90: WNL  WNL  (90)  Shoulder IR at 90: WNL  WNL  (70)    UE MMT  LEFT  RIGHT  Shoulder Shru-/5  5/5  Shoulder Abduction:   3/5  4/5  Shoulder Flexion:  3/5  4/5  Shoulder Extension:  4-/5  4/5    Elbow Flexion:   3+/5  4/5  Elbow Extension:  3/5  4/5    Thumb Extension:  4-/5  5/5  :     3/5  4/5    Joint Play  C1/C2: hypomobile  CTJ: hypomobile  Mid-Thoracic Spine: hypomobile    Palpation  (+) TTP of SP and TP of C3-C6, suboccipitals, Lt UT    Special Tests Performed  (+): ULTT, Spurling's, distraction, DNF endurance test    Cluster for Cervical Radiculopathy Diagnosis:   Cervical rotation <60 degrees to the affected side   + Distraction Test   + Spurlings Maneuver   + ULNT-Median    3 positive: +LR: 6 1  4 positive: +LR: 30 3           Insurance:  AMA/CMS Eval/ Re-eval POC expires Michaelle Boast #/ Referral # Total units  Start date  Expiration date Extension  Visit limitation? PT only or  PT+OT?  Co-Insurance   CMS (BC) 22   BOMN                                                                         Date               Units:  Used               Authed:  Remaining                     Date               Units:  Used               Authed:  Remaining                  Precautions: hx of depression and anxiety, hx of cervical fusion    Date 22        Visit Number IE        Manual         STM SOR x 8 min                                            TherEx          Upper cervical ext SNAG (C1/C2) x10                                                                       Neuro Re-Ed          Chin tuck 10 x 5"                                                                       TherAct         Patient education HEP and POC x 5 min                                            Gait Training Modalities

## 2022-07-29 NOTE — PROGRESS NOTES
PT Evaluation   Today's date: 2022  Patient name: Basilia Zhu  : 1955  MRN: [id number]  Referring provider: Guanaco Guevara  Dx:   Encounter Diagnosis     ICD-10-CM    1  Cervical spinal stenosis  M48 02    2  Cervical radiculopathy  M54 12        Assessment:    Basilia Zhu is a pleasant 73 y o  female who presents with cervical spinal stenosis and Lt sided cervical radiculopathy  Her primary movement system diagnosis is neck pain with radiculopathy and nociceptive pain resulting in pathoanatomical symptoms of decreased cervical range of motion, decreased cervical segmental mobility, decreased DNF endurance, and hypertonicity of suboccipitals  The aforementioned impairments have limited the patient's ability to perform recreational activities without pain and drive safely  No further referral is neccessary at this time based upon examination results  Positive prognostic indicators include motivation to improve and positive attitude  Negative prognostic indicators include chronicity of symptoms and prior failed treatment  Impairments: abnormal coordination, abnormal muscle firing, abnormal or restricted ROM, Impaired physical strength, lacks appropriate HEP, pain with function, poor posture and poor body mechanics    Symptom Irritability: high    Problem List:  - decreased cervical range of motion - therapeutic exercise, therapeutic activity, neuromuscular re-education and functional mobility  - decreased DNF endurance - therapeutic exercise, therapeutic activity, neuromuscular re-education, strengthening and functional mobility   - hypertonicity of suboccipitals - therapeutic exercise, therapeutic activity, neuromuscular re-education, strengthening and functional mobility     Patient education performed this session included: home exercise program, plan of care and prognosis and diagnosis    Patient verbalized good understanding of POC      Please contact me if you have any questions or recommendations  Thank you for the referral and the opportunity to share in [facility] care  Plan:     Patient would benefit from: Skilled PT  Planned modality interventions: biofeedback and thermotherapy (hot pack)  Planned therapy interventions: behavior modification, body mechanics training, breathing training, flexibility, functional ROM exercises, HEP, joint mobilization, manual therapy, motor coordination training, neuromuscular re-education, patient education, postural training, strengthening, stretching, therapeutic activities and therapeutic exercises    Frequency: 2x per week  Duration in weeks: 8 weeks    Plan of Care beginning date: 7/29/2022  Plan of Care expiration date: 12 weeks - 10/21/2022  Treatment plan discussed with: patient     History    History of Present Illness  Mechanism of injury: Patient reports a chronic history of neck pain  She has undergone an anterior fusion of C3-C4 and C4-C5 in 2019 and it was extended to C5-C6 in 2021  Patient's primary concern today is lessening range of motion with pain and tenderness to touch along the posterior cervical spine  In addition, she continues to get intermittent radicular pain and numbness into Lt hand  Her primary goal is to improve motion and prevent another surgery       Prior level of function: gardening, heavy housework    Pain  Current: 4/10  At best: 2/10  At worst: 7/10 (few times per week)    Location: both sides (Lt > Rt), wraps around to Lt shoulder and radiates to Lt arm and hand (pinky and ring finger)  Description: burning, radiating/shooting pain into Lt  Aggravating factors: lifting heavy objects  Relieving factors: heat, medications and light activity    Progression: no change    Previous treatment: physical therapy    Patient goals for therapy: decrease pain and increase motion    Social Support  Lives with: spouse    Hand dominance: right    Physical Examination    Red Flag Screening  (+): hx of cancer (melanoma), night sweats, recent infection (bronchitis), tingling into bilateral feet (Lt > Rt)     Postural Assessment  Posture in Sitting: poor  Posture in Standing: poor  Postural Correction: makes symptoms better    Sensation  Light touch: intact, except: posteriolateral Lt arm, forearm, and thumb (C5 and C6 dermatome)  Reflexes:     LEFT  RIGHT  C5: 1+   C5: 2+    C6: 1+   C6: 2+   Upper Motor Neuron Signs: none    UE AROM  Cervical Spine:   Flexion:  Minimally limited  without pain  Extension:  Moderately limited  with pain  Lateral Flexion - Left:  Severely limited  with pain  Lateral Flexion - Right:  Severely limited  with pain  Rotation - Left:  Severely limited  with pain  Rotation - Right:  Moderately limited  with pain    Thoracic Spine:   Flexion:  No limitation  without pain  Extension:  Moderately limited  without pain  Rotation - Left:  Moderately limited  without pain  Rotation - Right:  Moderately limited  without pain     UE PROM     LEFT  RIGHT  (Normal)  Shoulder Flexion: WNL  WNL  (180)  Shoulder Abduction: WNL  WNL  (180)  Shoulder Extension:  WNL  WNL  (60)  Shoulder ER at 90: WNL  WNL  (90)  Shoulder IR at 90:  WNL  WNL  (70)    UE MMT  LEFT  RIGHT  Shoulder Shru-/5  5/5  Shoulder Abduction:   3/5  4/5  Shoulder Flexion:  3/5  4/5  Shoulder Extension:  4-/5  4/5    Elbow Flexion:   3+/5  4/5  Elbow Extension:  3/5  4/5    Thumb Extension:  4-/5  5/5  :     3/5  4/5    Joint Play  C1/C2: hypomobile  CTJ: hypomobile  Mid-Thoracic Spine: hypomobile    Palpation  (+) TTP of SP and TP of C3-C6, suboccipitals, Lt UT    Special Tests Performed  (+): ULTT, Spurling's, distraction, DNF endurance test    Cluster for Cervical Radiculopathy Diagnosis:   Cervical rotation <60 degrees to the affected side   + Distraction Test   + Spurlings Maneuver   + ULNT-Median    3 positive: +LR: 6 1  4 positive: +LR: 30 3           Insurance:  AMA/CMS Eval/ Re-eval POC expires Deepa [place] #/ Referral # Total units Start date  Expiration date Extension  Visit limitation? PT only or  PT+OT?  Co-Insurance   CMS (BC) 7/29/22   BOMN                                                                         Date               Units:  Used               Authed:  Remaining                     Date               Units:  Used               Authed:  Remaining                  Precautions: hx of depression and anxiety, hx of cervical fusion    Date 7/29/22        Visit Number IE        Manual         STM SOR x 8 min                                            TherEx          Upper cervical ext SNAG (C1/C2) x10                                                                       Neuro Re-Ed          Chin tuck 10 x 5"                                                                       TherAct         Patient education HEP and POC x 5 min                                            Gait Training                                    Modalities

## 2022-07-31 DIAGNOSIS — E03.8 OTHER SPECIFIED HYPOTHYROIDISM: ICD-10-CM

## 2022-08-01 RX ORDER — LEVOTHYROXINE SODIUM 0.05 MG/1
TABLET ORAL
Qty: 90 TABLET | Refills: 0 | Status: SHIPPED | OUTPATIENT
Start: 2022-08-01 | End: 2022-10-28

## 2022-08-02 ENCOUNTER — OFFICE VISIT (OUTPATIENT)
Dept: PHYSICAL THERAPY | Facility: CLINIC | Age: 67
End: 2022-08-02
Payer: COMMERCIAL

## 2022-08-02 DIAGNOSIS — M48.02 CERVICAL SPINAL STENOSIS: Primary | ICD-10-CM

## 2022-08-02 DIAGNOSIS — M54.12 CERVICAL RADICULOPATHY: ICD-10-CM

## 2022-08-02 PROCEDURE — 97110 THERAPEUTIC EXERCISES: CPT

## 2022-08-02 PROCEDURE — 97140 MANUAL THERAPY 1/> REGIONS: CPT

## 2022-08-02 PROCEDURE — 97112 NEUROMUSCULAR REEDUCATION: CPT

## 2022-08-02 NOTE — PROGRESS NOTES
Daily Note     Today's date: 2022  Patient name: Fred Juarez  : 1955  MRN: [id number]  Referring provider: Compa Barrett MD  Dx:   Encounter Diagnosis     ICD-10-CM    1  Cervical spinal stenosis  M48 02    2  Cervical radiculopathy  M54 12        Start Time: 1630  Stop Time: 7266  Total time in clinic (min): 45 minutes    Subjective: Patient reports some improvement in neck symptoms since IE  Objective: See treatment diary below      Assessment: Tolerated treatment well  Patient demonstrates improved recruitment of DNF, but continues to struggle with endurance  Added postural strengthening and thoracic mobility activities in order to improve overall spinal mobility and stability  Patient demonstrated fatigue post treatment and would benefit from continued PT to improve functional mobility and return to PLOF  Plan: Continue per plan of care  Insurance:  AMA/CMS Eval/ Re-eval POC expires Abelardo Mar #/ Referral # Total units  Start date  Expiration date Extension  Visit limitation? PT only or  PT+OT?  Co-Insurance   CMS (BC) 22   BOMN                                                                         Date               Units:  Used               Authed:  Remaining                     Date               Units:  Used               Authed:  Remaining                  Precautions: hx of depression and anxiety, hx of cervical fusion    Date 22        Visit Number IE        Manual         STM SOR x 8 min SOR x 5 min       Joint mobs  Upper cervical UPA bilaterally x 15 min each                                  TherEx          Upper cervical ext SNAG (C1/C2) x10 Upper cervical ext SNAG (C1/C2) x10         Open books x10 each         Thoracic ext in chair x15                                                    Neuro Re-Ed          Chin tuck 10 x 5" Chin tuck 15 x 5"         DNF lift 15 x 5"         No money x 20 (RTB)                                                    TherAct Patient education HEP and POC x 5 min HEP and POC x 5 min                                           Gait Training                                    Modalities

## 2022-08-05 ENCOUNTER — OFFICE VISIT (OUTPATIENT)
Dept: PHYSICAL THERAPY | Facility: CLINIC | Age: 67
End: 2022-08-05
Payer: COMMERCIAL

## 2022-08-05 DIAGNOSIS — M54.12 CERVICAL RADICULOPATHY: ICD-10-CM

## 2022-08-05 DIAGNOSIS — M48.02 CERVICAL SPINAL STENOSIS: Primary | ICD-10-CM

## 2022-08-05 PROCEDURE — 97140 MANUAL THERAPY 1/> REGIONS: CPT

## 2022-08-05 PROCEDURE — 97112 NEUROMUSCULAR REEDUCATION: CPT

## 2022-08-05 PROCEDURE — 97110 THERAPEUTIC EXERCISES: CPT

## 2022-08-05 NOTE — PROGRESS NOTES
Daily Note     Today's date: 2022  Patient name: Mari Rashid  : 1955  MRN: [id number]  Referring provider: Linnea Torres MD  Dx:   Encounter Diagnosis     ICD-10-CM    1  Cervical spinal stenosis  M48 02    2  Cervical radiculopathy  M54 12        Start Time: 930  Stop Time: 1015  Total time in clinic (min): 45 minutes    Subjective: Patient reports some increased Rt sided pain in her neck today  No changes in routine that would have increased symptoms  Objective: See treatment diary below      Assessment: Tolerated treatment well  Continued with current POC emphasizing neuromuscular control of neck stabilizers and encouraging proper form throughout activities  Patient demonstrated poor form with HEP exercises which is likely related to increased soreness she is feeling today  Patient demonstrated adequate understanding of proper form by end of session  Patient demonstrated fatigue post treatment and would benefit from continued PT to improve functional mobility and reduce pain in order to return to PLOF  Plan: Continue per plan of care  Insurance:  AMA/CMS Eval/ Re-eval POC expires Keisha Finney #/ Referral # Total units  Start date  Expiration date Extension  Visit limitation? PT only or  PT+OT?  Co-Insurance   CMS (BC) 22   BOMN                                                                         Date               Units:  Used               Authed:  Remaining                     Date               Units:  Used               Authed:  Remaining                  Precautions: hx of depression and anxiety, hx of cervical fusion    Date 22      Visit Number IE 2 3      Manual         STM SOR x 8 min SOR x 5 min SOR x 5 min      Joint mobs  Upper cervical UPA bilaterally x 15 min each Upper cervical UPA gr 1-2 bilaterally x 10 min each         Thoracic PA gr 3 x 5 min                        TherEx          Upper cervical ext SNAG (C1/C2) x10 Upper cervical ext SNAG (C1/C2) x10 Upper cervical ext SNAG (C1/C2) x10        Open books x10 each Open books x10 each        Thoracic ext in chair x15 Thoracic ext in chair x15                                                   Neuro Re-Ed          Chin tuck 10 x 5" Chin tuck 15 x 5" Chin tuck 15 x 5"        DNF lift 15 x 5" DNF lift 15 x 5"        No money x 20 (RTB) No money x 20 (RTB)      proprioceptive exercises                                             TherAct         Patient education HEP and POC x 5 min HEP and POC x 5 min HEP and POC x 5 min                                          Gait Training                                    Modalities

## 2022-08-08 ENCOUNTER — OFFICE VISIT (OUTPATIENT)
Dept: PHYSICAL THERAPY | Facility: CLINIC | Age: 67
End: 2022-08-08
Payer: COMMERCIAL

## 2022-08-08 DIAGNOSIS — M48.02 CERVICAL SPINAL STENOSIS: Primary | ICD-10-CM

## 2022-08-08 DIAGNOSIS — M54.12 CERVICAL RADICULOPATHY: ICD-10-CM

## 2022-08-08 PROCEDURE — 97110 THERAPEUTIC EXERCISES: CPT

## 2022-08-08 PROCEDURE — 97140 MANUAL THERAPY 1/> REGIONS: CPT

## 2022-08-08 PROCEDURE — 97112 NEUROMUSCULAR REEDUCATION: CPT

## 2022-08-08 NOTE — PROGRESS NOTES
Daily Note     Today's date: 2022  Patient name: Huyen Dejesus  : 1955  MRN: [id number]  Referring provider: Italo Teran MD  Dx:   Encounter Diagnosis     ICD-10-CM    1  Cervical spinal stenosis  M48 02    2  Cervical radiculopathy  M54 12        Start Time: 1500  Stop Time: 1545  Total time in clinic (min): 45 minutes    Subjective: Patient reports continued improvement in neck pain and range of motion, but continues to have Rt shoulder/neck pain that shoots up into base of skull  Objective: See treatment diary below      Assessment: Tolerated treatment well  Continued advancement of DNF strengthening and thoracic mobility activities with addition of activities emphasizing proprioception  Patient exhibited poor neuromuscular and proprioceptive control of neck with patient indicating decreased awareness of proprioceptive deficits  Patient would benefit from continued PT to improve functional mobility and reduce pain in order to return to PLOF  Plan: Continue per plan of care  Insurance:  AMA/CMS Eval/ Re-eval POC expires Mayra Kanu #/ Referral # Total units  Start date  Expiration date Extension  Visit limitation? PT only or  PT+OT?  Co-Insurance   CMS (BC) 22   BOMN                                                                         Date               Units:  Used               Authed:  Remaining                     Date               Units:  Used               Authed:  Remaining                  Precautions: hx of depression and anxiety, hx of cervical fusion    Date 22     Visit Number IE 2 3 4     Manual         STM SOR x 8 min SOR x 5 min SOR x 5 min SOR x 5 min     Joint mobs  Upper cervical UPA bilaterally x 15 min each Upper cervical UPA gr 1-2 bilaterally x 10 min each Upper cervical UPA gr 1-2 bilaterally x 5 min each        Thoracic PA gr 3 x 5 min Thoracic CPA and Lt UPA gr 3 x 5 min                       TherEx          Upper cervical ext SNAG (C1/C2) x10 Upper cervical ext SNAG (C1/C2) x10 Upper cervical ext SNAG (C1/C2) x10        Open books x10 each Open books x10 each Open books x10 each       Thoracic ext in chair x15 Thoracic ext in chair x15 Thoracic ext in chair x15                                                  Neuro Re-Ed          Chin tuck 10 x 5" Chin tuck 15 x 5" Chin tuck 15 x 5" Chin tuck 20 x 5"       DNF lift 15 x 5" DNF lift 15 x 5" DNF lift 20 x 5"       No money x 20 (RTB) No money x 20 (RTB) No money x 20 (RTB)         propriocepon activities x 8 min                                         TherAct         Patient education HEP and POC x 5 min HEP and POC x 5 min HEP and POC x 5 min HEP and POC x 5 min                                         Gait Training                                    Modalities

## 2022-08-09 ENCOUNTER — APPOINTMENT (OUTPATIENT)
Dept: PHYSICAL THERAPY | Facility: CLINIC | Age: 67
End: 2022-08-09
Payer: COMMERCIAL

## 2022-08-12 ENCOUNTER — OFFICE VISIT (OUTPATIENT)
Dept: PHYSICAL THERAPY | Facility: CLINIC | Age: 67
End: 2022-08-12
Payer: COMMERCIAL

## 2022-08-12 DIAGNOSIS — M48.02 CERVICAL SPINAL STENOSIS: Primary | ICD-10-CM

## 2022-08-12 DIAGNOSIS — M54.12 CERVICAL RADICULOPATHY: ICD-10-CM

## 2022-08-12 PROCEDURE — 97140 MANUAL THERAPY 1/> REGIONS: CPT

## 2022-08-12 PROCEDURE — 97110 THERAPEUTIC EXERCISES: CPT

## 2022-08-12 NOTE — PROGRESS NOTES
Daily Note     Today's date: 2022  Patient name: Adali Tiwari  : 1955  MRN: [id number]  Referring provider: Ruma Doss MD  Dx:   Encounter Diagnosis     ICD-10-CM    1  Cervical spinal stenosis  M48 02    2  Cervical radiculopathy  M54 12        Start Time: 0800  Stop Time: 0840  Total time in clinic (min): 40 minutes    Patient co-treated by Physical Therapy Student Ariadna Liriano, under my direct supervision  Subjective: Patient reports feeling less tingling down her B hands and less pain in the R shoulder in the past few days  She reports still having pain in her neck  Objective: See treatment diary below      Assessment: Tolerated treatment well  Patient has 50% cervical rotation bilaterally, and 0% active lateral flexion bilaterally  Neural tension in radial nerve position noted on L side  Central nerve gliders were performed with radial nerve bias on L side  Increase in L shoulder flexion ROM and L cervical rotation noted after  Patient reported increase in ease of movement after  Patient educated on use of cervical roll to maintain neutral cervical position while sleeping  Patient expressed interest in buying and using one  Pt would benefit from continued PT to increase cervical ROM in all planes  Plan: Continue per plan of care  Insurance:  AMA/CMS Eval/ Re-eval POC expires Ronnie Chairez #/ Referral # Total units  Start date  Expiration date Extension  Visit limitation? PT only or  PT+OT?  Co-Insurance   CMS (BC) 22   BOMN                                                                         Date               Units:  Used               Authed:  Remaining                     Date               Units:  Used               Authed:  Remaining                  Precautions: hx of depression and anxiety, hx of cervical fusion    Date 22    Visit Number IE 2 3 4 5    Manual         STM SOR x 8 min SOR x 5 min SOR x 5 min SOR x 5 min     Joint mobs  Upper cervical UPA bilaterally x 15 min each Upper cervical UPA gr 1-2 bilaterally x 10 min each Upper cervical UPA gr 1-2 bilaterally x 5 min each        Thoracic PA gr 3 x 5 min Thoracic CPA and Lt UPA gr 3 x 5 min          Central nerve gliders 3x30s L only radial nerve bias         OA distraction 3x30s    TherEx          Upper cervical ext SNAG (C1/C2) x10 Upper cervical ext SNAG (C1/C2) x10 Upper cervical ext SNAG (C1/C2) x10  Upper cervical ext SNAG (C1/C2) x10      Open books x10 each Open books x10 each Open books x10 each       Thoracic ext in chair x15 Thoracic ext in chair x15 Thoracic ext in chair x15                                                  Neuro Re-Ed          Chin tuck 10 x 5" Chin tuck 15 x 5" Chin tuck 15 x 5" Chin tuck 20 x 5" Chin tuck 20 x 5"      DNF lift 15 x 5" DNF lift 15 x 5" DNF lift 20 x 5" DNF lift 20 x 5"      No money x 20 (RTB) No money x 20 (RTB) No money x 20 (RTB)         propriocepon activities x 8 min                                         TherAct         Patient education HEP and POC x 5 min HEP and POC x 5 min HEP and POC x 5 min HEP and POC x 5 min                                         Gait Training                                    Modalities

## 2022-08-19 ENCOUNTER — OFFICE VISIT (OUTPATIENT)
Dept: PHYSICAL THERAPY | Facility: CLINIC | Age: 67
End: 2022-08-19
Payer: COMMERCIAL

## 2022-08-19 DIAGNOSIS — M48.02 CERVICAL SPINAL STENOSIS: Primary | ICD-10-CM

## 2022-08-19 DIAGNOSIS — M54.12 CERVICAL RADICULOPATHY: ICD-10-CM

## 2022-08-19 PROCEDURE — 97530 THERAPEUTIC ACTIVITIES: CPT

## 2022-08-19 PROCEDURE — 97140 MANUAL THERAPY 1/> REGIONS: CPT
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
## 2022-08-23 ENCOUNTER — OFFICE VISIT (OUTPATIENT)
Dept: PHYSICAL THERAPY | Facility: CLINIC | Age: 67
End: 2022-08-23
Payer: COMMERCIAL

## 2022-08-23 DIAGNOSIS — M48.02 CERVICAL SPINAL STENOSIS: Primary | ICD-10-CM

## 2022-08-23 DIAGNOSIS — M54.12 CERVICAL RADICULOPATHY: ICD-10-CM

## 2022-08-23 PROCEDURE — 97110 THERAPEUTIC EXERCISES: CPT

## 2022-08-23 PROCEDURE — 97112 NEUROMUSCULAR REEDUCATION: CPT
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
## 2022-08-25 DIAGNOSIS — F33.41 MAJOR DEPRESSIVE DISORDER, RECURRENT, IN PARTIAL REMISSION (HCC): ICD-10-CM

## 2022-08-25 NOTE — TELEPHONE ENCOUNTER
----- Message from Denae Hammer sent at 2022  9:03 AM EDT -----  Regarding: refill  DULoxetine (CYMBALTA) 30 mg delayed release capsule ()    DULoxetine (CYMBALTA) 60 mg delayed release capsule    QUEtiapine (SEROquel) 100 mg tablet     [facility]/pharmacy #[id number] - [address]    10/12/22

## 2022-08-28 RX ORDER — QUETIAPINE FUMARATE 100 MG/1
100 TABLET, FILM COATED ORAL
Qty: 90 TABLET | Refills: 1 | Status: SHIPPED | OUTPATIENT
Start: 2022-08-28 | End: 2022-12-29 | Stop reason: SDUPTHER

## 2022-08-28 RX ORDER — DULOXETIN HYDROCHLORIDE 30 MG/1
30 CAPSULE, DELAYED RELEASE ORAL DAILY
Qty: 90 CAPSULE | Refills: 1 | Status: SHIPPED | OUTPATIENT
Start: 2022-08-28 | End: 2022-12-02

## 2022-08-28 RX ORDER — DULOXETIN HYDROCHLORIDE 60 MG/1
60 CAPSULE, DELAYED RELEASE ORAL DAILY
Qty: 90 CAPSULE | Refills: 1 | Status: SHIPPED | OUTPATIENT
Start: 2022-08-28 | End: 2022-12-29 | Stop reason: SDUPTHER

## 2022-08-29 ENCOUNTER — OFFICE VISIT (OUTPATIENT)
Dept: PHYSICAL THERAPY | Facility: CLINIC | Age: 67
End: 2022-08-29
Payer: COMMERCIAL

## 2022-08-29 DIAGNOSIS — M54.12 CERVICAL RADICULOPATHY: ICD-10-CM

## 2022-08-29 DIAGNOSIS — M48.02 CERVICAL SPINAL STENOSIS: Primary | ICD-10-CM

## 2022-08-29 PROCEDURE — 97140 MANUAL THERAPY 1/> REGIONS: CPT

## 2022-08-29 PROCEDURE — 97110 THERAPEUTIC EXERCISES: CPT

## 2022-08-29 PROCEDURE — 97112 NEUROMUSCULAR REEDUCATION: CPT
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
## 2022-08-31 ENCOUNTER — OFFICE VISIT (OUTPATIENT)
Dept: PHYSICAL THERAPY | Facility: CLINIC | Age: 67
End: 2022-08-31
Payer: COMMERCIAL

## 2022-08-31 DIAGNOSIS — M48.02 CERVICAL SPINAL STENOSIS: Primary | ICD-10-CM

## 2022-08-31 DIAGNOSIS — M54.12 CERVICAL RADICULOPATHY: ICD-10-CM

## 2022-08-31 PROCEDURE — 97112 NEUROMUSCULAR REEDUCATION: CPT

## 2022-08-31 PROCEDURE — 97140 MANUAL THERAPY 1/> REGIONS: CPT

## 2022-08-31 PROCEDURE — 97110 THERAPEUTIC EXERCISES: CPT

## 2022-08-31 NOTE — PROGRESS NOTES
Daily Note     Today's date: 2022  Patient name: Alfonzo Morales  : 1955  MRN: [id number]  Referring provider: Sulema Gonzalez  Dx:   Encounter Diagnosis     ICD-10-CM    1  Cervical spinal stenosis  M48 02    2  Cervical radiculopathy  M54 12        Start Time: 7903  Stop Time: 09  Total time in clinic (min): 44 minutes      Subjective: Patient reports continued improvement in neck rom and pain levels overall  She is hyper aware of her posture, even though it does cause some pain down her back she recognizes that she is changing a habit and this is to be expected early  She wants to DC her neck and start her POC for knee next visit  Objective: See treatment diary below      Assessment: Tolerated treatment well  Adequately challenged by current program  Integrated thoracic stretching for patient c/o posture concerns with good tolerance, provided as optional HEP to work on posture independently  Patient demonstrated fatigue post treatment and exhibited good technique with therapeutic exercises  Goals have been met and PT is dc'd with HEP  Plan: Discharge          Insurance:  AMA/CMS Eval/ Re-eval POC expires Michaelle Boast #/ Referral # Total units  Start date  Expiration date Extension  Visit limitation? PT only or  PT+OT?  Co-Insurance   CMS (BC) 22   BOMN                                                                         Date               Units:  Used               Authed:  Remaining                     Date               Units:  Used               Authed:  Remaining                  Precautions: hx of depression and anxiety, hx of cervical fusion    Date 22   Visit Number [phone]   Manual         STM SOR x 5 min  SOR x 5 min; SO STM x 5 min  SOR x 5 min; SO STM x 5 min SOR x5 min   Joint mobs Upper cervical UPA gr 1-2 bilaterally x 5 min each    Cervical/UT STM x 5 min UT STM 5'    Thoracic CPA and Lt UPA gr 3 x 5 min          Central nerve gliders 3x30s L only radial nerve bias         OA distraction 3x30s       TherEx           Upper cervical ext SNAG (C1/C2) x10        Open books x10 each   Open books x20 each Open books x20 each Open books x20 each    Thoracic ext in chair x15  Thoracic ext in chair x15 Thoracic ext in chair x15 Thoracic ext in chair x15 Thoracic foam roller series 1' ea    Thoracic ext in chair x15       SLR 2x10 Reviewed for HEP        Clamshells 2x10 Reviewed for HEP        SL hip abd 2x10 Reviewed for HEP        Standing rows x20 (RTB)              Neuro Re-Ed          Chin tuck 20 x 5" Chin tuck 20 x 5" Chin tuck 20 x 5" Chin tuck 20 x 5" Chin tuck 20 x 5" Chin tuck 20 x 5"    DNF lift 20 x 5" DNF lift 20 x 5" DNF lift 20 x 5" DNF lift 20 x 5" DNF lift 20 x 5" DNF lift 20 x 5"    No money x 20 (RTB)   No money x 20 (RTB) No money x 20 (RTB) No money x 20 (RTB)    propriocepon activities x 8 min   Horizontal abduction x20 (RTB) Horizontal abduction x20 (RTB) Horizontal abduction x20 (RTB)         Thread the needle x10 ea                              TherAct         Patient education HEP and POC x 5 min  POC associated with accident x 20 min POC x 5 min POC x 5 min                                        Gait Training                                    Modalities

## 2022-09-08 NOTE — DISCHARGE SUMMARY
Psychiatric Discharge Summary    Medical Record Number: [id number]  Encounter: [id number]    Discharge diagnosis:  Moderate episode of recurrent major depressive disorder ([facility] )    Secondary diagnoses:  Problem List     * (Principal) Moderate episode of recurrent major depressive disorder ([facility] )    Overview Deleted 3/3/2018 12:42 PM by SHRAVAN Moulton            Alcohol dependence in remission ([facility] ) (Chronic)    Allergic rhinitis (Chronic)    Arthropathy of multiple sites    Overview Signed 2/5/2018  2:38 PM by Rene Georges MD     Procedure/Onset: 07/11/2006         Esophagitis    Overview Signed 2/5/2018  2:38 PM by Rene Georges MD     Procedure/Onset: 07/11/2006         Irritable bowel syndrome (Chronic)    Overview Deleted 3/3/2018 12:43 PM by SHRAVAN Muolton            Raynaud's syndrome without gangrene    Overview Signed 2/5/2018  2:38 PM by Rene Georges MD     Procedure/Onset: 01/24/2006         Osteopenia (Chronic)    Pain in both feet    Lesion of left plantar nerve    Lesion of right plantar nerve    Congenital pes planus    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    Right foot pain    Radiculopathy of lumbar region (Chronic)    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[facility] )    Focal neurological deficit    GERD (gastroesophageal reflux disease)    Cervical myelopathy with cervical radiculopathy    History of recent intraspinal surgery    Autonomic orthostatic hypotension    History of migraine headaches    Mixed dyslipidemia    Lesion of lachelle    Hypertension    Right sided temporal headache    Fibromyalgia    ELLY (generalized anxiety disorder)    Herniated cervical disc    Overview Signed 4/11/2019 12:56 PM by Rene Georges MD     Last Assessment & Plan:    This is a 59 y o  right-handed female s/p ACDF C3-4, C$-5 POD #2  -medical management per medicine team  -neuro checks per protocol  -PT/OT->rehab  -physiatry ordered-suggest rehab placement  -bilateral lower extremity dopplers-await report  -follow exam  -pain control  -dvt prophylaxis-heparin SQ         Melanoma ([facility] )    Spinal stenosis    BMI 26 0-26 9,adult    Psychogenic weakness    History of cholecystectomy    Ambulatory dysfunction          HPI:     Nasra Rueda is a 59 y o  female with a history of Major Depressive Disorder and Generalized Anxiety Disorder who was admitted to the inpatient psychiatric unit on a voluntary 201 commitment basis due to depression, anxiety and suicidal ideation without plan      Symptoms prior to admission included worsening depression, suicidal ideation and anxiety symptoms       Onset of symptoms was gradual starting 2 weeks ago with gradually worsening course since that time  Stressors preceding admission included medical problems      Mian Newby was recently in the emergency department at [facility] on 8/27/19 as she was at her Endocrinologist that day and told them that she was feeling sad and down  Her endocrinologist wanted her evaluated in the ER  The ER felt that she may have had a reaction between the Cymbalta and Amitriptyline (of note patient was also started on synthroid as well)  The patient had stopped her Cymbalta on 8/26/19 and the recommendation was that she stay of the Cymbalta and was discharged         On 9/1/19 she felt very weak and did not want to get off of the couch, "I was feeling pain in my L hip and L knee and I didn't go to a family picnic and my mother was here from [place] "        On 9/3/19 she attempted to vacuum her house, she became SOB, she called 911 and was taken to [address] [place] [address], she was told she was able to go home as all tests appeared normal   She told them that she was physically unable to open her eyes, "I didn't think I was sad or depressed, but I guess that was underlying "  She was told she would be admitted for observation  Patient states during this admission she was taken off of the amitriptyline and put on Cymbalta    Patient states on Wed 9/4/19 she reports a rapid response was called due to a facial droop though she did not have a stroke        Patient states she saw Dr Cheryl Severs and he told her that they believed her symptoms of facial droop may have been psycho neurogenic  "The more I pondered this I became sad and depressed and when my  asked me if I wanted to hurt myself I told him a little bit  I told him I could jump in the laundry basket pull a blanket up over my head for a while and disappear for a little bit "  Dr Cheryl Severs suggested I come inpatient for a little while to be monitored  "I need to look at the connection between my physical symptoms and my mental health "       On initial evaluation after admission to the inpatient psychiatric unit Zachariah Parisheen depressed, anxious, cooperative, soft-spoken, logical, oriented and alert, and engaged in conversation  She reports that she wants to work on her mental health   [address] Course:     After the patient was admitted to the psychiatric unit  the patient had several psychotropic medication adjustments made to help stabilize their mood  Following these medication changes, the patient started to stabilize  Finally on 9/20/2019, the patient was found to be stable for discharge  On the day of discharge the patient reported their symptoms as significantly improved  All psychiatric medications were being tolerated without side effect or adverse event  No suicidal or homicidal ideations were present  The patient expressed their readiness and willingness to be discharged and referral to outpatient treatment was made  The case was discussed with Dr Marta Jorge and he has deemed the patient stable for discharge        Condition on discharge:     Improved    Medications Upon Discharge:       Current Facility-Administered Medications:     acetaminophen (TYLENOL) tablet 650 mg, 650 mg, Oral, Q6H PRN, Deniz Burnham MD    acetaminophen (TYLENOL) tablet 650 mg, 650 mg, Oral, Q4H PRN, Lacie Chris MD, 650 mg at 09/19/19 0913    acetaminophen (TYLENOL) tablet 975 mg, 975 mg, Oral, Q6H PRN, Lacie Chris MD, 975 mg at 09/19/19 1355    aluminum-magnesium hydroxide-simethicone (MYLANTA) 200-200-20 mg/5 mL oral suspension 30 mL, 30 mL, Oral, Q4H PRN, Marisol Junior PA-C, 30 mL at 09/11/19 1103    benztropine (COGENTIN) injection 1 mg, 1 mg, Intramuscular, Q1H PRN, Lacie Chris MD    calcium carbonate-vitamin D (OSCAL-D) 500 mg-200 units per tablet 1 tablet, 1 tablet, Oral, Daily With Breakfast, Marisol Junior PA-C, 1 tablet at 09/19/19 0829    DULoxetine (CYMBALTA) delayed release capsule 60 mg, 60 mg, Oral, Daily, Asa Foster MD, 60 mg at 09/19/19 0829    gabapentin (NEURONTIN) capsule 300 mg, 300 mg, Oral, Daily, Moira Anthony PA-C, 300 mg at 09/19/19 5071    gabapentin (NEURONTIN) capsule 600 mg, 600 mg, Oral, HS, Sherrie Hamm PA-C, 600 mg at 09/19/19 2104    haloperidol lactate (HALDOL) injection 5 mg, 5 mg, Intramuscular, Q6H PRN, Lacie Chris MD    hydrOXYzine HCL (ATARAX) tablet 50 mg, 50 mg, Oral, Q4H PRN, Lacie Chris MD    levothyroxine tablet 25 mcg, 25 mcg, Oral, Early Morning, Marisol Junior PA-C, 25 mcg at 09/20/19 0517    lidocaine (LIDODERM) 5 % patch 1 patch, 1 patch, Topical, Daily, SHRAVAN Azevedo, 1 patch at 09/19/19 0829    LORazepam (ATIVAN) 2 mg/mL injection 2 mg, 2 mg, Intramuscular, Q4H PRN, Lacie Chris MD    magnesium hydroxide (MILK OF MAGNESIA) 400 mg/5 mL oral suspension 30 mL, 30 mL, Oral, Daily PRN, Lacie Chris MD    melatonin tablet 3 mg, 3 mg, Oral, HS, Uriel Pereira PA-C, 3 mg at 09/19/19 2100    menthol-methyl salicylate (BENGAY) 36-57 % cream, , Apply externally, 4x Daily PRN, Lilly LAKE PA-C    OLANZapine (ZyPREXA) IM injection 5 mg, 5 mg, Intramuscular, Q3H PRN, Lacie Chris MD    risperiDONE (RisperDAL M-TABS) dispersible tablet 1 mg, 1 mg, Oral, Q3H PRN, Darvin Yarbroughrebecca Ross MD    senna (SENOKOT) tablet 17 2 mg, 2 tablet, Oral, HS, Abel Samayoa PA-C, 17 2 mg at 09/19/19 2104    traZODone (DESYREL) tablet 50 mg, 50 mg, Oral, HS PRN, Sherren Moose, MD, 50 mg at 09/19/19 2105    JEREMÍAS LottC Purse String (Simple) Text: Given the location of the defect and the characteristics of the surrounding skin a purse string simple closure was deemed most appropriate.  Undermining was performed circumferentially around the surgical defect.  A purse string suture was then placed and tightened.

## 2022-09-11 DIAGNOSIS — E78.2 MIXED DYSLIPIDEMIA: ICD-10-CM

## 2022-09-12 ENCOUNTER — EVALUATION (OUTPATIENT)
Dept: PHYSICAL THERAPY | Facility: CLINIC | Age: 67
End: 2022-09-12
Payer: COMMERCIAL

## 2022-09-12 DIAGNOSIS — M17.11 PRIMARY OSTEOARTHRITIS OF RIGHT KNEE: Primary | ICD-10-CM

## 2022-09-12 DIAGNOSIS — M25.561 CHRONIC PAIN OF RIGHT KNEE: ICD-10-CM

## 2022-09-12 DIAGNOSIS — G89.29 CHRONIC PAIN OF RIGHT KNEE: ICD-10-CM

## 2022-09-12 PROCEDURE — 97530 THERAPEUTIC ACTIVITIES: CPT

## 2022-09-12 PROCEDURE — 97161 PT EVAL LOW COMPLEX 20 MIN: CPT

## 2022-09-12 RX ORDER — ATORVASTATIN CALCIUM 20 MG/1
TABLET, FILM COATED ORAL
Qty: 90 TABLET | Refills: 2 | Status: SHIPPED | OUTPATIENT
Start: 2022-09-12

## 2022-09-12 NOTE — PROGRESS NOTES
PT Evaluation   Today's date: 2022  Patient name: Radha Fonseca  : 1955  MRN: [id number]  Referring provider: Jeferson Hanks DO  Dx:   Encounter Diagnosis     ICD-10-CM    1  Primary osteoarthritis of right knee  M17 11    2  Chronic pain of right knee  M25 561     G89 29        Assessment:  Radha Fonseca is a pleasant 73 y o  female who presents with a referring diagnosis of primary osteoarthritis of Rt knee  Her primary movement system diagnosis is hypermobility with decreased stability and nociceptive pain resulting in pathoanatomical symptoms of pain with function, pain during weightbearing activities, poor hip and knee neuromuscular control, and gross LE deficits in strength  The aforementioned impairments have limited the patient's ability to perform functional mobility tasks during times of pain and participate in recreational activities  No further referral is neccessary at this time based upon examination results  Patient was previously receiving formal PT services for her neck pain, but patient feels that she is able to independently manage her symptoms with the exercises taught in physical therapy  I plan to continually monitor neck symptoms and provide advancements to her home program as needed while transitioning care to focus on her knee pain  Positive prognostic indicators include motivation to improve, positive attitude and absence of red flags  Negative prognostic indicators include anxiety, depression and minimal changes in pain with movement       Impairments: abnormal muscle firing, abnormal or restricted ROM, activity intolerance, Impaired balance, Impaired physical strength, lacks appropriate HEP, pain with function, weight-bearing intolerance, poor posture and poor body mechanics    Symptom Irritability: low    Problem List:  - poor hip and knee neuromuscular control - therapeutic exercise, therapeutic activity, neuromuscular re-education, strengthening and functional mobility  - gross LE strength deficits - therapeutic exercise, therapeutic activity, neuromuscular re-education, strengthening and functional mobility    Patient education performed this session included: home exercise program, plan of care, expected tissue healing time and prognosis and diagnosis    Patient verbalized good understanding of POC  Please contact me if you have any questions or recommendations  Thank you for the referral and the opportunity to share in [facility] care  Plan    Patient would benefit from: Skilled PT  Planned modality interventions: biofeedback, cryotherapy and TENS  Planned therapy interventions: {activity modification, balance/weight bearing training, body mechanics training, flexibility, functional ROM exercises, graded exercise, HEP, joint mobilization, manual therapy, Nguyen taping, motor coordination training, neuromuscular re-education, patient education, postural training, strengthening, stretching, therapeutic activities and therapeutic exercises    Frequency: 2x per week  Duration in weeks: 8 weeks    Plan of Care beginning date: 9/12/2022  Plan of Care expiration date: 12 weeks - 12/5/2022  Treatment plan discussed with: patient      Goals  Short Term Goals (4 weeks):    1  Patient will be independent with basic HEP  2  Patient will report >50% reduction in pain  3  Patient will demonstrate >1/3 improvement in MMT grade as applicable  4  Patient will display improved knee valgus control during bilateral squat  5  Patient will be able to balance on one leg for >5 seconds    Long Term Goals (8 weeks):  1  Patient will be independent in a comprehensive home exercise program  2  Patient FOTO score will improve to 64/100  3  Patient will self-report >75% improvement in function  4  Patient will be able to perform heavy household tasks with <3/10 pain  5   Patient will be independent in management of knee flare-ups      History    History of Present Illness  Mechanism of injury: Patient reports insidious onset of knee pain at the beginning of summer 2022  She denies any known JOSE, but did state that she performed a lot of gardening at that time which resulted in long periods of kneeling on both knees  She states that both knees are bothering her, but Rt > Lt  Her primary complaint today is Rt knee pain  Patient states that she would ultimately like to get back to higher level activities such as running or other recreational activities       Weight bearing status: FWB      Pain  Current: 2/10  At best: 2/10  At worst: 8/10    Location: Rt lateral upper knee and medial lower knee  Description: burning  Aggravating factors: standing, walking, ascending stairs and descending stairs (inconsistent pattern of response depending on day)  Relieving factors: ice and rest    Progression: worsening    Patient goals for therapy: decrease pain (independent management), increase activity levels    Social Support  Lives in: multiple-level home  Steps to enter house: yes  Stairs in house: yes    Hand dominance: right      Physical Examination    Red Flag Screen  Positive for: age >47 years old    Lumbar Spine ROM  WNL, no pain or limitations in flexion, extension, lateral flexion and rotation    LE ROM  WNL and painfree in all directions    LE MMT  LEFT  RIGHT  Hip Flexion  4/5  4/5  Hip Extension   3+/5  3+/5  Hip Abduction  3/5  3/5  Hip Adduction  4/5  4/5    Knee Flexion  3+/5  3+/5  Knee Extension 3+/5  3+/5    Ankle DF  5/5  5/5    Sensation  Light touch: intact bilaterally    Reflexes:   LEFT  RIGHT  Patellar: 2+  Patellar: 2+   Achilles: 2+  Achilles: 2+     Palpation  (+) TTP posterior Rt knee    Joint Mobility     LEFT  RIGHT  Fibular head  WFL  WFL  Patella   WFL  WFL    Flexibility     LEFT  RIGHT  Hamstring  Mod limit Mod limit     Functional Assessment  Balance SLS:   LEFT: 1-2 sec   RIGHT: 1-2 sec  Functional Squat: dynamic knee valgus and excessive trunk flexion  Stair Negotiation: reciprocally scend and descned unless knee is already aggrevated   Gait Assessment: no notable antalgic gait           Insurance:  AMA/CMS Eval/ Re-eval POC expires Desi Hoffmann #/ Referral # Total units  Start date  Expiration date Extension  Visit limitation? PT only or  PT+OT?  Co-Insurance   CMS (BC) 9/12/22   No auth required [facility])                                                                         Date               Units:  Used               Authed:  Remaining                     Date               Units:  Used               Authed:  Remaining                  Precautions:   Past Medical History:   Diagnosis Date    Abdominal adhesions     Anesthesia complication     difficult to wake up    Anxiety     Arthritis     Cancer ([facility] )     melanoma    Depression     Depression     Disease of thyroid gland     Fibromyalgia     Fibromyalgia, primary     Fractures 2006    GERD (gastroesophageal reflux disease)     History of cholecystectomy 09/07/2019    Hyperlipidemia     Irregular heart beat     can be tachy; also has orthoststic hypotension    Lazy eye     resolved: 3/27/17    Medical clearance for psychiatric admission 07/13/2021    Melanoma ([facility] ) 2010    Osteoarthritis 07/2018    Osteopenia     with joint pain-elevated DEV    Psychiatric disorder     Shortness of breath     assoc with tachycardia    Stomach disorder 1995    Tinnitus     Wears glasses     for reading       Date 9/12/2022        Visit Number IE        Manual         Joint mobs         STM         Patella mobs                           TherEx         Active HANSEN         Knee PROM         Bridge progression         Squat progression         Sidelying hip ABD         SAQ         LAQ LAQ into RTB x20                           Neuro Re-Ed         Quad sets         Clamshells         SL balance         TKE into ball x20 with 3s hold submax                                            TherAct Patient education HEP and POC x 10 min                                            Gait Training         AD training                           Modalities         CP

## 2022-09-14 ENCOUNTER — OFFICE VISIT (OUTPATIENT)
Dept: PHYSICAL THERAPY | Facility: CLINIC | Age: 67
End: 2022-09-14
Payer: COMMERCIAL

## 2022-09-14 ENCOUNTER — OFFICE VISIT (OUTPATIENT)
Dept: FAMILY MEDICINE CLINIC | Facility: CLINIC | Age: 67
End: 2022-09-14
Payer: COMMERCIAL

## 2022-09-14 VITALS
SYSTOLIC BLOOD PRESSURE: 122 MMHG | TEMPERATURE: 97.2 F | RESPIRATION RATE: 18 BRPM | HEIGHT: 65 IN | WEIGHT: 163.4 LBS | DIASTOLIC BLOOD PRESSURE: 78 MMHG | OXYGEN SATURATION: 99 % | HEART RATE: 85 BPM | BODY MASS INDEX: 27.22 KG/M2

## 2022-09-14 DIAGNOSIS — G89.29 CHRONIC PAIN OF RIGHT KNEE: ICD-10-CM

## 2022-09-14 DIAGNOSIS — K13.79 MOUTH SORES: Primary | ICD-10-CM

## 2022-09-14 DIAGNOSIS — M25.561 CHRONIC PAIN OF RIGHT KNEE: ICD-10-CM

## 2022-09-14 DIAGNOSIS — M17.11 PRIMARY OSTEOARTHRITIS OF RIGHT KNEE: Primary | ICD-10-CM

## 2022-09-14 PROCEDURE — 1160F RVW MEDS BY RX/DR IN RCRD: CPT | Performed by: NURSE PRACTITIONER

## 2022-09-14 PROCEDURE — 97112 NEUROMUSCULAR REEDUCATION: CPT

## 2022-09-14 PROCEDURE — 99213 OFFICE O/P EST LOW 20 MIN: CPT | Performed by: NURSE PRACTITIONER

## 2022-09-14 PROCEDURE — 97110 THERAPEUTIC EXERCISES: CPT

## 2022-09-14 RX ORDER — SENNOSIDES 8.6 MG
CAPSULE ORAL AS NEEDED
COMMUNITY

## 2022-09-14 RX ORDER — TRIAMCINOLONE ACETONIDE 0.1 %
1 PASTE (GRAM) DENTAL
Qty: 5 G | Refills: 0 | Status: SHIPPED | OUTPATIENT
Start: 2022-09-14

## 2022-09-14 NOTE — PROGRESS NOTES
Daily Note     Today's date: 2022  Patient name: Huyen Dejesus  : 1955  MRN: [id number]  Referring provider: Peter Cervantes  Dx:   Encounter Diagnosis     ICD-10-CM    1  Primary osteoarthritis of right knee  M17 11    2  Chronic pain of right knee  M25 561     G89 29                   Subjective: The knee has not been hurting the same since she started treatment for it, hips were sore after last visit  She is fatigued this morning  Objective: See treatment diary below      Assessment: Tolerated treatment well  Patient demonstrated fatigue post treatment, exhibited good technique with therapeutic exercises and would benefit from continued PT  Tolerated additional interventions well with intermittent breaks, monitor response at f/u and adjust HEP  Most challenged by standing hip extension - investigate potential cause of limited hip extension as a  of knee pain  Plan: Continue per plan of care  Once soreness resolves, patient will add hip 3 way to HEP         Insurance:  A/CMS Eval/ Re-eval POC expires Mayra Livedom #/ Referral # Total units  Start date  Expiration date Extension  Visit limitation? PT only or  PT+OT?  Co-Insurance   CMS (BC) 22   No auth required [facility])                                                                      Precautions:   Past Medical History:   Diagnosis Date    Abdominal adhesions     Anesthesia complication     difficult to wake up    Anxiety     Arthritis     Cancer ([facility] )     melanoma    Depression     Depression     Disease of thyroid gland     Fibromyalgia     Fibromyalgia, primary     Fractures     GERD (gastroesophageal reflux disease)     History of cholecystectomy 2019    Hyperlipidemia     Irregular heart beat     can be tachy; also has orthoststic hypotension    Lazy eye     resolved: 3/27/17    Medical clearance for psychiatric admission 2021    Melanoma ([facility] )     Osteoarthritis 2018    Osteopenia with joint pain-elevated DEV    Psychiatric disorder     Shortness of breath     assoc with tachycardia    Stomach disorder 1995    Tinnitus     Wears glasses     for reading       Date 9/12/2022 9/14       Visit Number IE 2       Manual         Joint mobs         STM         Patella mobs                           TherEx         Active HANSEN  NS L1 5'       Knee PROM  8' HS/quad stretch       Bridge progression  2x10       Squat progression  2x10 UE support        Sidelying hip ABD         SAQ         LAQ LAQ into RTB x20  Into RTB 2x10       Hip three way standing   x10 ea B                Neuro Re-Ed         Quad sets         Clamshells  Supine RTB x20 5s hold        Adductor isometric/glute set  x20 5s hold       SL balance         TKE into ball x20 with 3s hold submax x20 with 3s hold submax                                           TherAct         Patient education HEP and POC x 10 min                                            Gait Training         AD training                           Modalities         CP

## 2022-09-14 NOTE — PROGRESS NOTES
Assessment/Plan:    1  Mouth sores  Comments:  seems fungal and will start treatment and if no improvement then will follow back  Orders:  -     triamcinolone (KENALOG) 0 1 % oral topical paste; Apply 1 application topically daily at bedtime  -     nystatin (MYCOSTATIN) 500,000 units/5 mL suspension; Apply 5 mL (500,000 Units total) to the mouth or throat 4 (four) times a day            Patient Instructions:  Supportive care discussed and advised  Advised to RTO for any worsening and no improvement  Follow up for no improvement and worsening of conditions  Patient advised and educated when to see immediate medical care  Return if symptoms worsen or fail to improve  Future Appointments   Date Time Provider Nick Hancock   9/19/2022  4:30 PM [address] PT [facility]   9/21/2022  8:45 AM Heike Marks, PT [place] PT [facility]   10/7/2022  9:00 AM Mackenzie Castillo DO [facility]   10/12/2022  9:30 AM YURI [facility]   12/7/2022  2:30 PM SHRAVAN Lozano [facility]           Subjective:      Patient ID: Cresencio Lowe is a 73 y o  female  Chief Complaint   Patient presents with    Mouth Lesions     Sores in mouth that burn, hard to eat  Started on saturday nm  lpn    Lymphadenopathy     Swollen lymph nodes         Vitals:  /78   Pulse 85   Temp (!) 97 2 °F (36 2 °C)   Resp 18   Ht 5' 5" (1 651 m)   Wt 74 1 kg (163 lb 6 4 oz)   LMP  (LMP Unknown)   SpO2 99%   BMI 27 19 kg/m²     HPI  Patient stated that noticed some sores on her mouth on 9/11/2022 and having some discomfort at area  Denies any recent antibiotic use  Recently was at dentist before this started  Denies fever and chills       The following portions of the patient's history were reviewed and updated as appropriate: allergies, current medications, past family history, past medical history, past social history, past surgical history and problem list       Review of Systems   Constitutional: Negative for chills, diaphoresis, fatigue, fever and unexpected weight change  HENT: Positive for mouth sores  Negative for congestion, dental problem, drooling, ear discharge, ear pain, facial swelling, hearing loss, nosebleeds, postnasal drip, rhinorrhea, sinus pressure, sinus pain, sneezing, sore throat, tinnitus, trouble swallowing and voice change  Respiratory: Negative for cough, chest tightness, shortness of breath and wheezing  Cardiovascular: Negative  Gastrointestinal: Negative for abdominal pain, constipation, diarrhea, nausea and vomiting  Skin: Negative  Neurological: Negative for dizziness, light-headedness and headaches  Objective:    Social History     Tobacco Use   Smoking Status Never Smoker   Smokeless Tobacco Never Used       Allergies:    Allergies   Allergen Reactions    Meperidine Lightheadedness and Other (See Comments)    Sulfa Antibiotics Hives         Current Outpatient Medications   Medication Sig Dispense Refill    acetaminophen (TYLENOL) 650 mg CR tablet if needed      albuterol (ProAir HFA) 90 mcg/act inhaler Inhale 2 puffs every 6 (six) hours as needed for wheezing 8 5 g 0    atorvastatin (LIPITOR) 20 mg tablet TAKE 1 TABLET BY MOUTH EVERY DAY 90 tablet 2    calcium carbonate (OS-WILLY) 1250 (500 Ca) MG tablet Take 1,250 mg by mouth      DULoxetine (CYMBALTA) 30 mg delayed release capsule Take 1 capsule (30 mg total) by mouth daily 90 capsule 1    DULoxetine (CYMBALTA) 60 mg delayed release capsule Take 1 capsule (60 mg total) by mouth daily 90 capsule 1    gabapentin (NEURONTIN) 300 mg capsule Take 2 capsules (600 mg total) by mouth 2 (two) times a day 120 capsule 6    levothyroxine 50 mcg tablet TAKE 1 TABLET BY MOUTH EVERY DAY 90 tablet 0    Magnesium 400 MG TABS Take by mouth daily      metoprolol succinate (TOPROL-XL) 25 mg 24 hr tablet Take 25 mg by mouth daily      midodrine (PROAMATINE) 10 MG tablet TAKE 1 TABLET BY MOUTH THREE TIMES A  tablet 0    nystatin (MYCOSTATIN) 500,000 units/5 mL suspension Apply 5 mL (500,000 Units total) to the mouth or throat 4 (four) times a day 473 mL 0    pantoprazole (PROTONIX) 40 mg tablet TAKE 1 TABLET BY MOUTH EVERY DAY 90 tablet 1    QUEtiapine (SEROquel) 100 mg tablet Take 1 tablet (100 mg total) by mouth daily at bedtime 90 tablet 1    Sodium Fluoride 5000 Sensitive 1 1-5 % GEL As dir      triamcinolone (KENALOG) 0 1 % oral topical paste Apply 1 application topically daily at bedtime 5 g 0    ALPRAZolam (XANAX) 0 5 mg tablet Take 0 5 mg by mouth 2 (two) times a day as needed (Patient not taking: Reported on 9/14/2022)       No current facility-administered medications for this visit  Physical Exam  Vitals reviewed  Constitutional:       Appearance: Normal appearance  She is well-developed  HENT:      Head: Normocephalic  Right Ear: External ear normal       Left Ear: External ear normal       Nose: Nose normal       Right Sinus: No maxillary sinus tenderness or frontal sinus tenderness  Left Sinus: No maxillary sinus tenderness or frontal sinus tenderness  Mouth/Throat:      Mouth: No oral lesions  Pharynx: No oropharyngeal exudate or posterior oropharyngeal erythema  Comments: Scattered tiny whitish coated vesciles noted under upper lip of left corner and under right upper corner and under tongue and   Cardiovascular:      Rate and Rhythm: Normal rate and regular rhythm  Heart sounds: Normal heart sounds  Pulmonary:      Effort: Pulmonary effort is normal       Breath sounds: Normal breath sounds  Musculoskeletal:         General: Normal range of motion  Cervical back: Neck supple  Lymphadenopathy:      Cervical:      Right cervical: No superficial or posterior cervical adenopathy  Left cervical: No superficial or posterior cervical adenopathy  Skin:     General: Skin is warm and dry  Neurological:      Mental Status: She is alert and oriented to person, place, and time  Psychiatric:         Behavior: Behavior normal          Thought Content:  Thought content normal          Judgment: Judgment normal                      SHRAVAN Godinez

## 2022-09-19 ENCOUNTER — OFFICE VISIT (OUTPATIENT)
Dept: PHYSICAL THERAPY | Facility: CLINIC | Age: 67
End: 2022-09-19
Payer: COMMERCIAL

## 2022-09-19 DIAGNOSIS — M25.561 CHRONIC PAIN OF RIGHT KNEE: ICD-10-CM

## 2022-09-19 DIAGNOSIS — M17.11 PRIMARY OSTEOARTHRITIS OF RIGHT KNEE: Primary | ICD-10-CM

## 2022-09-19 DIAGNOSIS — G89.29 CHRONIC PAIN OF RIGHT KNEE: ICD-10-CM

## 2022-09-19 PROCEDURE — 97112 NEUROMUSCULAR REEDUCATION: CPT

## 2022-09-19 PROCEDURE — 97110 THERAPEUTIC EXERCISES: CPT

## 2022-09-19 NOTE — PROGRESS NOTES
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  Objective: See treatment diary below  B/L quad and hip flexor length WNL  Assessment: Tolerated treatment well  Continued with established POC emphasis neuromuscular control of hip and knee musculature  Patient continues to demonstrate neuromuscular control deficits that present as strength deficits during functional activities  Patient demonstrated fatigue post treatment and would benefit from continued PT to improve functional mobility and reduce pain in order to return to PLOF  Plan: Continue per plan of care  Insurance:  AMA/CMS Eval/ Re-eval POC expires Torres Hoang #/ Referral # Total units  Start date  Expiration date Extension  Visit limitation? PT only or  PT+OT?  Co-Insurance   CMS (BC) 22   No auth required [facility])                                                                      Precautions:   Past Medical History:   Diagnosis Date    Abdominal adhesions     Anesthesia complication     difficult to wake up    Anxiety     Arthritis     Cancer ([facility] [address] )     melanoma    Depression     Depression     Disease of thyroid gland     Fibromyalgia     Fibromyalgia, primary     Fractures 2006    GERD (gastroesophageal reflux disease)     History of cholecystectomy 2019    Hyperlipidemia     Irregular heart beat     can be tachy; also has orthoststic hypotension    Lazy eye     resolved: 3/27/17    Medical clearance for psychiatric admission 07/13/2021    Melanoma ([facility] [address] ) 2010    Osteoarthritis 07/2018    Osteopenia     with joint pain-elevated DEV    Psychiatric disorder     Shortness of breath     assoc with tachycardia    Stomach disorder 1995    Tinnitus     Wears glasses     for reading       Date 9/12/2022 9/14 9/19/22      Visit Number IE 2 3      Manual         Joint mobs         STM         Patella mobs                           TherEx         Active HANSEN  NS L1 5' NS L1 10'      Knee PROM  8' HS/quad stretch       Bridge progression  2x10 2x10      Squat progression  2x10 UE support  2x10 UE support       Sidelying hip ABD         SAQ         LAQ LAQ into RTB x20  Into RTB 2x10 Into RTB 2x10      Hip three way standing   x10 ea B x10 ea B               Neuro Re-Ed         Quad sets         Clamshells  Supine RTB x20 5s hold  Supine RTB x20 5s hold       Adductor isometric/glute set  x20 5s hold x20 5s hold      SL balance         TKE into ball x20 with 3s hold submax x20 with 3s hold submax x20 with 3s hold submax                                          TherAct         Patient education HEP and POC x 10 min                                            Gait Training         AD training                           Modalities         CP

## 2022-09-21 ENCOUNTER — APPOINTMENT (OUTPATIENT)
Dept: PHYSICAL THERAPY | Facility: CLINIC | Age: 67
End: 2022-09-21
Payer: COMMERCIAL

## 2022-10-03 DIAGNOSIS — I95.1 ORTHOSTATIC HYPOTENSION: ICD-10-CM

## 2022-10-03 RX ORDER — MIDODRINE HYDROCHLORIDE 10 MG/1
TABLET ORAL
Qty: 270 TABLET | Refills: 0 | Status: SHIPPED | OUTPATIENT
Start: 2022-10-03

## 2022-10-13 ENCOUNTER — OFFICE VISIT (OUTPATIENT)
Dept: OBGYN CLINIC | Facility: CLINIC | Age: 67
End: 2022-10-13
Payer: COMMERCIAL

## 2022-10-13 VITALS
HEIGHT: 65 IN | WEIGHT: 170 LBS | SYSTOLIC BLOOD PRESSURE: 128 MMHG | DIASTOLIC BLOOD PRESSURE: 78 MMHG | BODY MASS INDEX: 28.32 KG/M2 | HEART RATE: 80 BPM

## 2022-10-13 DIAGNOSIS — M76.31 IT BAND SYNDROME, RIGHT: ICD-10-CM

## 2022-10-13 DIAGNOSIS — G89.29 CHRONIC PAIN OF RIGHT KNEE: ICD-10-CM

## 2022-10-13 DIAGNOSIS — M25.561 CHRONIC PAIN OF RIGHT KNEE: ICD-10-CM

## 2022-10-13 DIAGNOSIS — M17.11 PRIMARY OSTEOARTHRITIS OF RIGHT KNEE: Primary | ICD-10-CM

## 2022-10-13 PROCEDURE — 20610 DRAIN/INJ JOINT/BURSA W/O US: CPT | Performed by: ORTHOPAEDIC SURGERY

## 2022-10-13 PROCEDURE — 99214 OFFICE O/P EST MOD 30 MIN: CPT | Performed by: ORTHOPAEDIC SURGERY

## 2022-10-13 RX ORDER — BUPIVACAINE HYDROCHLORIDE 2.5 MG/ML
4 INJECTION, SOLUTION INFILTRATION; PERINEURAL
Status: COMPLETED | OUTPATIENT
Start: 2022-10-13 | End: 2022-10-13

## 2022-10-13 RX ORDER — TRIAMCINOLONE ACETONIDE 40 MG/ML
80 INJECTION, SUSPENSION INTRA-ARTICULAR; INTRAMUSCULAR
Status: COMPLETED | OUTPATIENT
Start: 2022-10-13 | End: 2022-10-13

## 2022-10-13 RX ADMIN — BUPIVACAINE HYDROCHLORIDE 4 ML: 2.5 INJECTION, SOLUTION INFILTRATION; PERINEURAL at 10:19

## 2022-10-13 RX ADMIN — TRIAMCINOLONE ACETONIDE 80 MG: 40 INJECTION, SUSPENSION INTRA-ARTICULAR; INTRAMUSCULAR at 10:19

## 2022-10-13 NOTE — PROGRESS NOTES
Assessment/Plan:  1  Primary osteoarthritis of right knee  Diclofenac Sodium (VOLTAREN) 1 %    Large joint arthrocentesis: R knee   2  It band syndrome, right  Diclofenac Sodium (VOLTAREN) 1 %   3  Chronic pain of right knee       Scribe Attestation    I,:  Citlalli Tesfaye am acting as a scribe while in the presence of the attending physician :       I,:  Abel Travis DO personally performed the services described in this documentation    as scribed in my presence :         Physical examination was discussed at length with patient today and she was advised that her pain is not only calm from right knee osteoarthritis but also right distal IT band syndrome  She was offered cortisone steroid injection for her right knee osteoarthritis pain  Patient accepted and it marking Kenalog injection was administered using aseptic technique  Patient tolerated injection well and was advised of post injection protocol  Topical Voltaren was sent to pharmacy on file and she was provided with an exercise packet to do on her own due to the time constraints of physical therapy and increased pain from attended therapy  Patient will follow up in the office in 3 months or as needed  Large joint arthrocentesis: R knee  Universal Protocol:  Consent: Verbal consent obtained    Risks and benefits: risks, benefits and alternatives were discussed  Consent given by: patient  Patient understanding: patient states understanding of the procedure being performed  Patient consent: the patient's understanding of the procedure matches consent given  Site marked: the operative site was marked  Supporting Documentation  Indications: pain   Procedure Details  Location: knee - R knee  Preparation: Patient was prepped and draped in the usual sterile fashion  Ultrasound guidance: no  Medications administered: 4 mL bupivacaine 0 25 %; 80 mg triamcinolone acetonide 40 mg/mL    Patient tolerance: patient tolerated the procedure well with no immediate complications  Dressing:  Sterile dressing applied          Subjective: right knee pain    Patient ID: Amanda Rhodes is a 78 y o  femalewho presents for a follow up for her right knee Osteoarthritis  She states that she had increased right knee pain after her third therapy session  She states that she has increased pain and difficulty getting up from a low seated position  She states that she has increased pain going down stairs  She describes anteriolateral shooting pain that she describes as a 9/10 pain level  She states that she has been applying Voltaren gel with some relief  Review of Systems   Constitutional: Positive for activity change  Negative for chills, fever and unexpected weight change  HENT: Negative for hearing loss, nosebleeds and sore throat  Eyes: Negative for pain, redness and visual disturbance  Respiratory: Negative for cough, shortness of breath and wheezing  Cardiovascular: Negative for chest pain, palpitations and leg swelling  Gastrointestinal: Negative for abdominal pain, nausea and vomiting  Endocrine: Negative for polydipsia and polyuria  Genitourinary: Negative for dysuria and hematuria  Musculoskeletal: Positive for arthralgias  Skin: Negative for rash and wound  Neurological: Negative for dizziness and headaches  Psychiatric/Behavioral: Negative for decreased concentration, dysphoric mood and suicidal ideas  The patient is not nervous/anxious            Past Medical History:   Diagnosis Date   • Abdominal adhesions    • Anesthesia complication     difficult to wake up   • Anxiety    • Arthritis    • Cancer (HCC)     melanoma   • Depression    • Depression    • Disease of thyroid gland    • Fibromyalgia    • Fibromyalgia, primary    • Fractures 2006   • GERD (gastroesophageal reflux disease)    • History of cholecystectomy 09/07/2019   • Hyperlipidemia    • Irregular heart beat     can be tachy; also has orthoststic hypotension   • Lazy eye     resolved: 3/27/17   • Medical clearance for psychiatric admission 07/13/2021   • Melanoma ([address] ) 2010   • Osteoarthritis 07/2018   • Osteopenia     with joint pain-elevated DEV   • Psychiatric disorder    • Shortness of breath     assoc with tachycardia   • Stomach disorder 1995   • Tinnitus    • Wears glasses     for reading       Past Surgical History:   Procedure Laterality Date   • ABDOMINAL SURGERY      lysis of adhesions x 2   • APPENDECTOMY     • CERVICAL FUSION      May 5,2021 and Jan 01,2020   • CHOLECYSTECTOMY      open   • COLONOSCOPY     • DILATION AND CURETTAGE OF UTERUS     • FOOT SURGERY  05/2017   • NECK SURGERY  01/2019 5/2021   • NEUROMA EXCISION Right 05/26/2017    Procedure: EXCISION MASS / FIBROMA FOOT;  Surgeon: Flory Rouse DPM;  Location: [address] [place] [address];  Service:    • PARS PLANA VITRECTOMY W/ REPAIR OF MACULAR HOLE  12/23/2020   • OH XCAPSL CTRC RMVL INSJ IO LENS PROSTH W/O ECP Left 12/06/2021    Procedure: EXTRACTION EXTRACAPSULAR CATARACT PHACO INTRAOCULAR LENS (IOL);   Surgeon: Gladys Shirley MD;  Location: [facility] MAIN OR;  Service: Ophthalmology   • RIGHT OOPHORECTOMY     • WISDOM TOOTH EXTRACTION      x4       Family History   Problem Relation Age of Onset   • Heart disease Mother    • Stroke Mother 58   • COPD Mother    • Arthritis Mother    • Hypertension Father    • Kidney disease Brother         kidney transplant   • Multiple sclerosis Sister    • No Known Problems Maternal Aunt    • No Known Problems Maternal Uncle    • No Known Problems Paternal Aunt    • No Known Problems Paternal Uncle    • No Known Problems Maternal Grandmother    • No Known Problems Maternal Grandfather    • No Known Problems Paternal Grandmother    • No Known Problems Paternal Grandfather    • Dislocations Sister    • Neurological problems Sister    • Scoliosis Sister    • ADD / ADHD Neg Hx    • Anesthesia problems Neg Hx    • Cancer Neg Hx    • Clotting disorder Neg Hx    • Collagen disease Neg Hx    • Diabetes Neg Hx • Dislocations Neg Hx    • Learning disabilities Neg Hx    • Neurological problems Neg Hx    • Osteoporosis Neg Hx    • Rheumatologic disease Neg Hx    • Scoliosis Neg Hx    • Vascular Disease Neg Hx        Social History     Occupational History   • Not on file   Tobacco Use   • Smoking status: Never Smoker   • Smokeless tobacco: Never Used   Vaping Use   • Vaping Use: Never used   Substance and Sexual Activity   • Alcohol use: No   • Drug use: No   • Sexual activity: Not Currently         Current Outpatient Medications:   •  Diclofenac Sodium (VOLTAREN) 1 %, Apply 2 g topically 4 (four) times a day, Disp: 150 g, Rfl: 0  •  acetaminophen (TYLENOL) 650 mg CR tablet, if needed, Disp: , Rfl:   •  albuterol (ProAir HFA) 90 mcg/act inhaler, Inhale 2 puffs every 6 (six) hours as needed for wheezing, Disp: 8 5 g, Rfl: 0  •  ALPRAZolam (XANAX) 0 5 mg tablet, Take 0 5 mg by mouth 2 (two) times a day as needed (Patient not taking: Reported on 9/14/2022), Disp: , Rfl:   •  atorvastatin (LIPITOR) 20 mg tablet, TAKE 1 TABLET BY MOUTH EVERY DAY, Disp: 90 tablet, Rfl: 2  •  calcium carbonate (OS-WILLY) 1250 (500 Ca) MG tablet, Take 1,250 mg by mouth, Disp: , Rfl:   •  DULoxetine (CYMBALTA) 30 mg delayed release capsule, Take 1 capsule (30 mg total) by mouth daily, Disp: 90 capsule, Rfl: 1  •  DULoxetine (CYMBALTA) 60 mg delayed release capsule, Take 1 capsule (60 mg total) by mouth daily, Disp: 90 capsule, Rfl: 1  •  gabapentin (NEURONTIN) 300 mg capsule, Take 2 capsules (600 mg total) by mouth 2 (two) times a day, Disp: 120 capsule, Rfl: 6  •  levothyroxine 50 mcg tablet, TAKE 1 TABLET BY MOUTH EVERY DAY, Disp: 90 tablet, Rfl: 0  •  Magnesium 400 MG TABS, Take by mouth daily, Disp: , Rfl:   •  metoprolol succinate (TOPROL-XL) 25 mg 24 hr tablet, Take 25 mg by mouth daily, Disp: , Rfl:   •  midodrine (PROAMATINE) 10 MG tablet, TAKE 1 TABLET BY MOUTH THREE TIMES A DAY, Disp: 270 tablet, Rfl: 0  •  nystatin (MYCOSTATIN) 500,000 units/5 mL suspension, Apply 5 mL (500,000 Units total) to the mouth or throat 4 (four) times a day, Disp: 473 mL, Rfl: 0  •  pantoprazole (PROTONIX) 40 mg tablet, TAKE 1 TABLET BY MOUTH EVERY DAY, Disp: 90 tablet, Rfl: 1  •  QUEtiapine (SEROquel) 100 mg tablet, Take 1 tablet (100 mg total) by mouth daily at bedtime, Disp: 90 tablet, Rfl: 1  •  Sodium Fluoride 5000 Sensitive 1 1-5 % GEL, As dir, Disp: , Rfl:   •  triamcinolone (KENALOG) 0 1 % oral topical paste, Apply 1 application topically daily at bedtime, Disp: 5 g, Rfl: 0    Allergies   Allergen Reactions   • Meperidine Lightheadedness and Other (See Comments)   • Sulfa Antibiotics Hives       Objective:  Vitals:    10/13/22 0942   BP: 128/78   Pulse: 80       Body mass index is 28 29 kg/m²  Right Knee Exam     Tenderness   Right knee tenderness location: LCL  Tests   Varus: negative Valgus: negative  Patellar apprehension: negative    Other   Erythema: absent  Scars: absent    Comments:  Stable to varus valgus stress at 0° 30° 90°  Tender to palpation over the distal IT band  Positive grind test   Parapatellar crepitus with motion  Full flexion and extension   No erythema ecchymosis or warmth            Physical Exam  Vitals and nursing note reviewed  Constitutional:       Appearance: She is well-developed  HENT:      Head: Normocephalic  Nose: Nose normal    Eyes:      Extraocular Movements: Extraocular movements intact  Conjunctiva/sclera: Conjunctivae normal    Cardiovascular:      Rate and Rhythm: Normal rate  Pulmonary:      Effort: Pulmonary effort is normal       Breath sounds: Normal breath sounds  Musculoskeletal:      Cervical back: Normal range of motion  Skin:     General: Skin is warm and dry  Neurological:      Mental Status: She is alert and oriented to person, place, and time  Psychiatric:         Behavior: Behavior normal            I have personally reviewed pertinent films in PACS    No new images reviewed This document was created using speech voice recognition software  Grammatical errors, random word insertions, pronoun errors, and incomplete sentences are an occasional consequence of this system due to software limitations, ambient noise, and hardware issues  Any formal questions or concerns about content, text, or information contained within the body of this dictation should be directly addressed to the provider for clarification

## 2022-10-18 ENCOUNTER — OFFICE VISIT (OUTPATIENT)
Dept: PSYCHIATRY | Facility: CLINIC | Age: 67
End: 2022-10-18
Payer: COMMERCIAL

## 2022-10-18 VITALS — WEIGHT: 160.8 LBS | BODY MASS INDEX: 26.79 KG/M2 | HEIGHT: 65 IN

## 2022-10-18 DIAGNOSIS — F41.1 GENERALIZED ANXIETY DISORDER: ICD-10-CM

## 2022-10-18 DIAGNOSIS — F51.05 INSOMNIA RELATED TO ANOTHER MENTAL DISORDER: ICD-10-CM

## 2022-10-18 DIAGNOSIS — Z63.0 MARITAL CONFLICT: ICD-10-CM

## 2022-10-18 DIAGNOSIS — F33.41 MAJOR DEPRESSIVE DISORDER, RECURRENT, IN PARTIAL REMISSION (HCC): Primary | ICD-10-CM

## 2022-10-18 PROCEDURE — 99214 OFFICE O/P EST MOD 30 MIN: CPT | Performed by: NURSE PRACTITIONER

## 2022-10-18 SDOH — SOCIAL STABILITY - SOCIAL INSECURITY: PROBLEMS IN RELATIONSHIP WITH SPOUSE OR PARTNER: Z63.0

## 2022-10-18 NOTE — PATIENT INSTRUCTIONS
Continue current medications/doses:                duloxetine 60 mg capsule - take 1 capsule by mouth every day               duloxetine 30 mg capsule - take 1 capsule by mouth every day               Note: total daily dose of duloxetine  = 90 mg              quetiapine 100 mg tablet - take 1 tablet by mouth daily at bedtime        Restart seeing Ezio Godfrey LCSW ([address] [place] [address] Dir) for psychotherapy

## 2022-10-18 NOTE — BH TREATMENT PLAN
TREATMENT PLAN         [facility]    Name and Date of Birth:  Mari Rashid 78 y o  1955    Date of Treatment Plan: October 18, 2022    Diagnosis/Diagnoses:    1  Major depressive disorder, recurrent, in partial remission ([facility] [address] )    2  Generalized anxiety disorder    3  Insomnia related to another mental disorder    4  Marital conflict        Strengths/Personal Resources for Self-Care: financial security, ability to express needs, motivation for treatment     Area/Areas of need (in own words): depression, gambling addiction, emotional insecurity  1          Long Term Goal: continue to improve control of depression, ultimate goal no hospitalization  Target date: 4/18/2023  Person/Persons responsible for completion of goal: sarah Buckley     2          Short Term Objective (s) - How will we reach this goal?:   A  Provider new recommended medication/dosage changes and/or continue medication(s):  Cymbalta, Seroquel  B  take medications as prescribed, attend scheduled appointments  C  See [facility] ([address] [place] [address]) for psychotherapy   Target date: 4/18/2023  Person/Persons Responsible for Completion of Goal: sarah Buckley      Progress Towards Goals: progressing     Treatment Modality: medication management every 12 weeks     Review due 120 - 180 days from date of this plan: 4/18/2023  Expected length of service: ongoing treatment  My Physician/PA/NP and I have developed this plan together and I agree to work on the goals and objectives  I understand the treatment goals that were developed for my treatment

## 2022-10-18 NOTE — PSYCH
This note was not shared with the patient due to privacy exception: note includes other individuals     Scott      Name and Date of Birth:  Gwendolyn Feng 78 y o  1955    Date of Visit: 10/18/22      Gwendolyn Feng was seen today for medication management and brief psychotherapy  Office Regular Visit    Throughout this appointment, COVID-19 precautions were followed at all times: masks were worn by this patient and the provider, social distancing was maintained, hand-washing was performed before and after appointment, and the provider's room was ventilated before this patient entered the room and after this patient left it          Time spent on psychotherapy: 5 minutes    Treatment modality:   Medication management   Medication education        SUBJECTIVE: pt reports taking psychiatric medications as prescribed, denies side effects   For the most part, other than what she is going through in her marriage, thinks duloxetine is good, helps her keep busy and distract from negative self-talk  Sleep is good - usually falls asleep within 45 minutes of taking quetiapine  On a diet - "Cyndy" - 750 rafa/day  Recently began having weird dreams, very vivid, not nightmares  Substitute teaching a lot, 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[employer], also may do the same in [employer] of going back into psychotherapy and [facility], because of issues with Elenor Poag; agrees to referral to see her  Would like to continue current medications/doses          7/18/2022: pt reports taking psychiatric medications as prescribed, denies side effects  Reports "excellent" moods  Is on a diet plan that has decreased her appetite and has lost about 8 lbs, she intends to be on it until the endo of August and reach her goal weight of 150 lbs  Sleeping a lot for a while, sleeps for 7 5 hours and gets up to her alarm, then goes back to sleep for two hours  Pt was dx with pneumonia in the ED and is taking an antibiotic, has felt fatigue for a while   Mother had to go to the ED because her dog made her fall, pt was able to deal with supporting her mother without becoming over-emotional  Now realizes that her mother did the best she did raising pt, and pt doesn't resent her anymore  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, it's [employer] in [place], and is part of [employer]; pt had a conflict with one of the ladies there and was able to let it roll over her back and realized ti wasn't herself, and the woman us now responding to her in a positive way  Pt realizes it isn't always about herself  Actively listened to pt  Offered validation of pt's emotions  Continue current medications/doses:                duloxetine 60 mg capsule - take 1 capsule by mouth every day               duloxetine 30 mg capsule - take 1 capsule by mouth every day               Note: total daily dose of duloxetine  = 90 mg              quetiapine 100 mg tablet - take 1 tablet by mouth daily at bedtime  Continue to see [facility]) for psychotherapy        4/26/3033: taking duloxetine and quetiapine as prescribed, denies side effects  Doing excellent, feels she is more control of her moods they don't control her  Feeling better about herself, thinks she has made very good progress in psychotherapy  Not saying she is sorry for the way that she feels, also not running off to her mother when she calls  Sleeps 9 pm to 5-5:30 am without trazodone, so she hasn't been taking it  Appetite on -  tries to watch what she eats and can lose a pound but not two, has two desert cookies after dinner, the serving size is four cookies and she figures that is ok  Better physically, still taking antibiotics for bronchitis  Just finished PT today  Tim Galeana sleeping well using a CPAP and is not as grouchy, he hasn't always understood her psychiatric hospitalizations  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, gardening  Explained effect of sugar and carbohydrates on blood sugar and weight   Continue current medications/doses:                duloxetine 60 mg capsule - take 1 capsule by mouth every day               duloxetine 30 mg capsule - take 1 capsule by mouth every day               Note: total daily dose of duloxetine  = 90 mg              quetiapine 100 mg tablet - take 1 tablet by mouth daily at bedtime  Continue to see [address] [place] [address]) for psychotherapy         She denies suicidal ideation, intent or plan at present, has no suicidal ideation, intent or plan at present  She denies any auditory hallucinations and visual hallucinations, denies any other delusional thinking, denies any delusional thinking  She denies any side effects from medications      HPI ROS Appetite Changes and Sleep:   Appetite - normal    Sleep - normal    Review Of Systems:      Constitutional Negative   ENT Negative   Cardiovascular Negative   Respiratory Negative   Gastrointestinal Negative   Genitourinary Negative   Musculoskeletal Negative   Integumentary Negative   Neurological Negative   Endocrine Negative   Other Symptoms Negative and None       Laboratory Results: No results found for this or any previous visit      Substance Abuse History:    Social History     Substance and Sexual Activity   Drug Use No       Family Psychiatric History:     Family History   Problem Relation Age of Onset   • Heart disease Mother    • Stroke Mother 58   • COPD Mother    • Arthritis Mother    • Hypertension Father    • Kidney disease Brother         kidney transplant   • Multiple sclerosis Sister    • No Known Problems Maternal Aunt    • No Known Problems Maternal Uncle    • No Known Problems Paternal Aunt    • No Known Problems Paternal Uncle    • No Known Problems Maternal Grandmother    • No Known Problems Maternal Grandfather    • No Known Problems Paternal Grandmother    • No Known Problems Paternal Grandfather    • Dislocations Sister    • Neurological problems Sister    • Scoliosis Sister    • ADD / ADHD Neg Hx    • Anesthesia problems Neg Hx    • Cancer Neg Hx    • Clotting disorder Neg Hx    • Collagen disease Neg Hx    • Diabetes Neg Hx    • Dislocations Neg Hx    • Learning disabilities Neg Hx    • Neurological problems Neg Hx    • Osteoporosis Neg Hx    • Rheumatologic disease Neg Hx    • Scoliosis Neg Hx    • Vascular Disease Neg Hx        The following portions of the patient's history were reviewed and updated as appropriate: past family history, past medical history, past social history, past surgical history and problem list     Social History     Socioeconomic History   • Marital status: /Civil Union     Spouse name: Not on file   • Number of children: Not on file   • Years of education: Not on file   • Highest education level: Not on file   Occupational History   • Not on file   Tobacco Use   • Smoking status: Never Smoker   • Smokeless tobacco: Never Used   Vaping Use   • Vaping Use: Never used   Substance and Sexual Activity   • Alcohol use: No   • Drug use: No   • Sexual activity: Not Currently   Other Topics Concern   • Not on file   Social History Narrative   • Not on file     Social Determinants of Health     Financial Resource Strain: Not on file   Food Insecurity: Not on file   Transportation Needs: Not on file   Physical Activity: Not on file   Stress: Not on file   Social Connections: Not on file   Intimate Partner Violence: Not on file   Housing Stability: Not on file     Social History     Social History Narrative   • Not on file        Social History     Tobacco History     Smoking Status  Never Smoker    Smokeless Tobacco Use  Never Used          Alcohol History     Alcohol Use Status  No          Drug Use     Drug Use Status  No          Sexual Activity     Sexually Active  Not Currently Activities of Daily Living    Not Asked                     OBJECTIVE:     Mental Status Evaluation:    Appearance age appropriate, casually dressed   Behavior pleasant, cooperative   Speech normal rate, rhythm, volume   Mood Generally eithymic    Affect Normal range and intensity, appropriate   Thought Processes Organized, goal-directed   Associations intact associations   Thought Content No overt delusions   Perceptual Disturbances: No auditory hallucinations, no visual hallucinations   Abnormal Thoughts  Risk Potential Suicidal ideation - None  Homicidal ideation - None  Potential for aggression - No   Orientation oriented to person, place, date and situation   Memory recent and remote memory grossly intact   Consciousness alert and awake   Attention Span attention span and concentration are age appropriate   Intellect Not formally assessed   Insight intact   Judgement intact    Muscle Strength and  Gait muscle strength and tone were normal, gait normal   Language no difficulty naming common objects, repeating a phrase   Fund of Knowledge displays adequate knowledge of current events, past history, vocabulary               The patient's chart was reviewed for relevant lab reports and recent encounters with other providers  Medications, treatment progress, and current treatment plan that was enacted on 4/26/2022 and due for review/update on10/26/2022  were reviewed with the patient  The treatment plan was updated today with the patient who verbally agreed with the updated plan  Today's treatment plan is due for review/update NLT 4/18/2023  Pt and writer were unable to sign plan because signature pad is not working      Treatment plan goals discussed in this encounter: continue to improve control of depression, ultimate goal no hospitalization        Assessment/Plan:       Diagnoses and all orders for this visit:    Major depressive disorder, recurrent, in partial remission ([facility] [address] )    Generalized anxiety disorder    Insomnia related to another mental disorder    Marital conflict  -     Ambulatory referral to Stephanie Salinas;  Future          Treatment Recommendations/Precautions:      Continue current medications/doses:                duloxetine 60 mg capsule - take 1 capsule by mouth every day               duloxetine 30 mg capsule - take 1 capsule by mouth every day               Note: total daily dose of duloxetine  = 90 mg              quetiapine 100 mg tablet - take 1 tablet by mouth daily at bedtime        Restart seeing Charlene Fernandez LCSW [facility] for psychotherapy        Risks/Benefits       Risks, Benefits And Possible Side Effects Of Medications:     Risks, benefits, and possible side effects of medications explained to patient and patient verbalizes understanding and agreement for treatment      Controlled Medication Discussion:      Not applicable

## 2022-10-28 DIAGNOSIS — E03.8 OTHER SPECIFIED HYPOTHYROIDISM: ICD-10-CM

## 2022-10-28 RX ORDER — LEVOTHYROXINE SODIUM 0.05 MG/1
TABLET ORAL
Qty: 90 TABLET | Refills: 0 | Status: SHIPPED | OUTPATIENT
Start: 2022-10-28

## 2022-10-31 ENCOUNTER — TELEPHONE (OUTPATIENT)
Dept: NEUROLOGY | Facility: CLINIC | Age: 67
End: 2022-10-31

## 2022-10-31 NOTE — TELEPHONE ENCOUNTER
I agree with ED evaluation  She has fibromyalgia as well  If it's different from her typical symptoms, then should be evaluated, otherwise can try gabapentin additional 600mg dose to see if it helps  oral

## 2022-10-31 NOTE — TELEPHONE ENCOUNTER
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

## 2022-11-10 ENCOUNTER — OFFICE VISIT (OUTPATIENT)
Dept: FAMILY MEDICINE CLINIC | Facility: CLINIC | Age: 67
End: 2022-11-10

## 2022-11-10 VITALS
SYSTOLIC BLOOD PRESSURE: 90 MMHG | HEIGHT: 65 IN | TEMPERATURE: 97.5 F | OXYGEN SATURATION: 94 % | WEIGHT: 161 LBS | RESPIRATION RATE: 16 BRPM | HEART RATE: 92 BPM | BODY MASS INDEX: 26.82 KG/M2 | DIASTOLIC BLOOD PRESSURE: 60 MMHG

## 2022-11-10 DIAGNOSIS — F33.41 MAJOR DEPRESSIVE DISORDER, RECURRENT, IN PARTIAL REMISSION (HCC): ICD-10-CM

## 2022-11-10 DIAGNOSIS — K21.9 GASTROESOPHAGEAL REFLUX DISEASE, UNSPECIFIED WHETHER ESOPHAGITIS PRESENT: ICD-10-CM

## 2022-11-10 DIAGNOSIS — E78.2 MIXED DYSLIPIDEMIA: ICD-10-CM

## 2022-11-10 DIAGNOSIS — J20.9 ACUTE BRONCHITIS, UNSPECIFIED ORGANISM: Primary | ICD-10-CM

## 2022-11-10 DIAGNOSIS — M48.02 CERVICAL STENOSIS OF SPINAL CANAL: ICD-10-CM

## 2022-11-10 PROBLEM — C80.1 CANCER (HCC): Status: ACTIVE | Noted: 2022-11-10

## 2022-11-10 PROBLEM — M81.0 OSTEOPOROSIS: Status: ACTIVE | Noted: 2022-11-10

## 2022-11-10 PROBLEM — F32.9 MAJOR DEPRESSION, SINGLE EPISODE: Status: ACTIVE | Noted: 2022-11-10

## 2022-11-10 PROBLEM — M47.812 CERVICAL SPONDYLOSIS WITHOUT MYELOPATHY: Status: ACTIVE | Noted: 2018-11-20

## 2022-11-10 PROBLEM — F41.9 ANXIETY: Status: ACTIVE | Noted: 2022-11-10

## 2022-11-10 PROBLEM — H40.9 GLAUCOMA: Status: ACTIVE | Noted: 2022-11-10

## 2022-11-10 RX ORDER — ACETAMINOPHEN 500 MG
TABLET ORAL
COMMUNITY
Start: 2022-11-04

## 2022-11-10 RX ORDER — CLOPIDOGREL BISULFATE 75 MG/1
75 TABLET ORAL
COMMUNITY
Start: 2022-11-04 | End: 2022-11-24

## 2022-11-10 RX ORDER — PANTOPRAZOLE SODIUM 40 MG/1
40 TABLET, DELAYED RELEASE ORAL DAILY
Qty: 30 TABLET | Refills: 0 | Status: SHIPPED | OUTPATIENT
Start: 2022-11-10

## 2022-11-10 RX ORDER — AZITHROMYCIN 250 MG/1
TABLET, FILM COATED ORAL
Qty: 6 TABLET | Refills: 0 | Status: SHIPPED | OUTPATIENT
Start: 2022-11-10 | End: 2022-11-15

## 2022-11-10 RX ORDER — METHYLPREDNISOLONE 4 MG/1
TABLET ORAL
Qty: 21 TABLET | Refills: 0 | Status: SHIPPED | OUTPATIENT
Start: 2022-11-10

## 2022-11-10 RX ORDER — ALBUTEROL SULFATE 90 UG/1
2 AEROSOL, METERED RESPIRATORY (INHALATION) EVERY 6 HOURS PRN
Qty: 18 G | Refills: 2 | Status: SHIPPED | OUTPATIENT
Start: 2022-11-10

## 2022-11-10 NOTE — PATIENT INSTRUCTIONS
Azithromycin (By mouth)   Azithromycin ([id number]-[id number])  Treats infections  This medicine is a macrolide antibiotic  Brand Name(s): Zithromax, Zithromax Tri-Kaleb, Zithromax Z-Kaleb, Zmax   There may be other brand names for this medicine  When This Medicine Should Not Be Used: This medicine is not right for everyone  Do not use it if you had an allergic reaction to azithromycin, erythromycin, or similar medicines, or if you have a history of liver problems caused by azithromycin  How to Use This Medicine:   Capsule, Liquid, Packet, Powder, Tablet  Your doctor will tell you how much medicine to use  Do not use more than directed  Take all of the medicine in your prescription to clear up your infection, even if you feel better after the first few doses  Multiple dose (Zithromax® oral liquid or tablets): You may take this medicine with or without food  Shake the bottle well before you measure the medicine  Measure the oral liquid medicine with a marked measuring spoon, oral syringe, or medicine cup  Single dose (Zmax® extended-release oral liquid or powder):   Liquid:   Take this medicine on an empty stomach at least 1 hour before you eat, or 2 hours after you eat  Call your doctor right away if you vomit within 1 hour after you take the medicine  You must take the liquid within 12 hours after the pharmacist gives it to you  Shake the bottle well before you measure the medicine  Measure your dose with a marked measuring spoon, cup, or syringe  Powder:   Open 1 packet and pour all of the medicine into a glass with about 2 ounces (¼ cup) of water  Mix well and drink the medicine right away  Pour another 2 ounces of water into the same glass, and drink the remaining medicine  Read and follow the patient instructions that come with this medicine  Talk to your doctor or pharmacist if you have any questions  Missed dose: If you are taking multiple doses, take the dose as soon as you remember   If it is almost time for your next dose, wait until then to take a regular dose  Do not use extra medicine to make up for a missed dose  Store the medicine in a closed container at room temperature, away from heat, moisture, and direct light  Extended-release oral liquid: Do not refrigerate or freeze  Oral liquid for 1 dose only: Store at room temperature  Do not store in the refrigerator or allow the medicine to freeze  Oral liquid for multiple doses: Store at room temperature or in the refrigerator  Use it within 10 days of filling the prescription  Drugs and Foods to Avoid:   Ask your doctor or pharmacist before using any other medicine, including over-the-counter medicines, vitamins, and herbal products  Some medicines can affect how azithromycin works  Tell your doctor if you are also using any of the following:  Colchicine, cyclosporine, digoxin, nelfinavir, phenytoin  Blood thinner (including warfarin)  Ergot medicine  Medicine for a heart rhythm problem (including amiodarone, dofetilide, procainamide, quinidine, sotalol)  Zithromax® for multiple doses: Do not take an antacid that contains magnesium or aluminum at the same time you take Zithromax®  An antacid will affect how the medicine works  Antacids will not affect Zmax® for single dose  Warnings While Using This Medicine:   Tell your doctor if you are pregnant or breastfeeding, or if you have kidney disease, liver disease, heart disease, heart rhythm problems, heart failure, or myasthenia gravis  Tell your doctor if anyone in your family has heart rhythm problems    This medicine may cause the following problems:   Serious skin reactions, including Serna-Brennan syndrome, acute generalized exanthematous pustulosis, toxic epidermal necrolysis, and drug reaction with eosinophilia and systemic symptoms (DRESS)  Liver problems  Infantile hypertrophic pyloric stenosis  Heart rhythm problems, including QT prolongation  Increased risk of serious heart or blood vessel problems  This medicine can cause diarrhea  Call your doctor if the diarrhea becomes severe, does not stop, or is bloody  Do not take any medicine to stop diarrhea until you have talked to your doctor  Diarrhea can occur 2 months or more after you stop taking this medicine  It may occur 2 months or more after you stop using this medicine  Call your doctor if your symptoms do not improve or if they get worse  Your doctor will do lab tests at regular visits to check on the effects of this medicine  Keep all appointments  Keep all medicine out of the reach of children  Never share your medicine with anyone  Possible Side Effects While Using This Medicine:   Call your doctor right away if you notice any of these side effects: Allergic reaction: Itching or hives, swelling in your face or hands, swelling or tingling in your mouth or throat, chest tightness, trouble breathing  Blistering, peeling, red skin rash  Dark urine, pale stools, nausea, vomiting, loss of appetite, stomach pain, yellow skin or eyes  Fainting, dizziness, lightheadedness  Fast, pounding, or uneven heartbeat, blurred vision, chest pain, trouble breathing, tiredness or weakness  Feeling irritable or vomits after feeding (in babies)  Severe diarrhea that may contain blood, stomach cramps, fever  If you notice these less serious side effects, talk with your doctor:   Mild diarrhea, nausea, vomiting, stomach pain  If you notice other side effects that you think are caused by this medicine, tell your doctor  Call your doctor for medical advice about side effects  You may report side effects to FDA at 6-072-FDA-5762    © Copyright [employer] 2022 Information is for End User's use only and may not be sold, redistributed or otherwise used for commercial purposes  The above information is an  only  It is not intended as medical advice for individual conditions or treatments   Talk to your doctor, nurse or pharmacist before following any medical regimen to see if it is safe and effective for you  Methylprednisolone (By mouth)   Methylprednisolone (meth-il-pred-NIS-oh-lone)  Treats inflammation, severe allergies, flare-ups of ongoing illnesses, and many other medical problems  May also be used to decrease some symptoms of cancer  This medicine is a corticosteroid  Brand Name(s): Medrol, Medrol Dosepak, Methylpred-DP   There may be other brand names for this medicine  When This Medicine Should Not Be Used: This medicine is not right for everyone  Do not use it if you had an allergic reaction to methylprednisolone, or if you have a fungus infection  How to Use This Medicine:   Tablet  Take your medicine as directed  Your dose may need to be changed several times to find what works best for you  If you are using this medicine for an ongoing illness, your dose may need to be changed occasionally  Some people take this medicine only every other day, which helps to decrease side effects  Take your medicine in the morning, unless your doctor tells you otherwise  It is best to take this medicine with food or milk  Missed dose: Take a dose as soon as you remember  If it is almost time for your next dose, wait until then and take a regular dose  Do not take extra medicine to make up for a missed dose  Store the medicine in a closed container at room temperature, away from heat, moisture, and direct light  Drugs and Foods to Avoid:   Ask your doctor or pharmacist before using any other medicine, including over-the-counter medicines, vitamins, and herbal products  Some medicines can affect how methylprednisolone works  Tell your doctor if you are using any of the following:  Cyclosporine, ketoconazole, phenobarbital, phenytoin, rifampin, troleandomycin  Aspirin, especially in high doses  Blood thinner (including warfarin)  Diabetes medicine  This medicine may interfere with vaccines   Ask your doctor before you get a flu shot or any other vaccines  Warnings While Using This Medicine:   Tell your doctor if you are pregnant or breastfeeding, or if you have kidney disease, liver disease (including cirrhosis), adrenal gland problems, heart failure, high blood pressure, diabetes, osteoporosis, blood clotting problems, thyroid problems, mental health problems (including depression), myasthenia gravis, or stomach or bowel problems (including ulcer or diverticulitis)  Tell your doctor if you have an infection (including herpes eye infection, tuberculosis, or threadworm)  Also tell your doctor if you have a recent exposure to chickenpox or measles  This medicine may cause the following problems:  Increased risk of infection  Changes in mood or behavior  High blood pressure  Adrenal gland problems  Eye problems or changes in vision (including cataracts or glaucoma)  Bone problems (including osteoporosis)  Slow growth in children  Increased risk for cancer (including Kaposi's sarcoma)  If you use this medicine for a long time, tell your doctor about any extra stress or anxiety in your life, including other health concerns and emotional stress  Your dose might need to be changed for a short time while you have extra stress  Do not stop using this medicine suddenly  Your doctor will need to slowly decrease your dose before you stop it completely  Tell any doctor or dentist who treats you that you are using this medicine  This medicine may affect certain medical test results  Your doctor will do lab tests at regular visits to check on the effects of this medicine  Keep all appointments  Keep all medicine out of the reach of children  Never share your medicine with anyone  Possible Side Effects While Using This Medicine:   Call your doctor right away if you notice any of these side effects:   Allergic reaction: Itching or hives, swelling in your face or hands, swelling or tingling in your mouth or throat, chest tightness, trouble breathing  Bone pain, decrease in height  Dark freckles, skin color changes, coldness, weakness, tiredness, weight loss  Depression, unusual thoughts, feelings, or behaviors, trouble sleeping  Fever, chills, cough, sore throat, body aches  Severe stomach pain, nausea, vomiting, or red or black stools  Skin changes or growths  Swelling in your hands, ankles, or feet, rapid weight gain  Trouble seeing, blurred vision or other changes in vision, eye pain, headache  Unusual bleeding or bruising  If you notice these less serious side effects, talk with your doctor: Increased appetite  Round, puffy face  Weight gain around your neck, upper back, breast, face, or waist  If you notice other side effects that you think are caused by this medicine, tell your doctor  Call your doctor for medical advice about side effects  You may report side effects to FDA at 7-663-FDA-2327    © Copyright [employer] 2022 Information is for End User's use only and may not be sold, redistributed or otherwise used for commercial purposes  The above information is an  only  It is not intended as medical advice for individual conditions or treatments  Talk to your doctor, nurse or pharmacist before following any medical regimen to see if it is safe and effective for you

## 2022-11-10 NOTE — PROGRESS NOTES
Assessment/Plan:    1  Acute bronchitis, unspecified organism  -     azithromycin (ZITHROMAX) 250 mg tablet; Take 500mg on day 1, 250mg on days 2-5  -     methylPREDNISolone 4 MG tablet therapy pack; Use as directed on package  -     albuterol (ProAir HFA) 90 mcg/act inhaler; Inhale 2 puffs every 6 (six) hours as needed for wheezing    2  Gastroesophageal reflux disease, unspecified whether esophagitis present  Comments:  protonix filled for short term and due to follow with gastro and will scheudule that  Orders:  -     pantoprazole (PROTONIX) 40 mg tablet; Take 1 tablet (40 mg total) by mouth daily    3  [facility] disorder, recurrent, [facility]  Assessment & Plan:  Managed by psychatrist      4  Mixed dyslipidemia  Assessment & Plan:  Complaint with statin and tolerating it well      5  Cervical stenosis of spinal canal  Assessment & Plan:  Worsening as per patient as per recent MRI and follow up scheduled with neurosurgery              Patient Instructions: Take medication with food  It is important that you take the entire course of antibiotics prescribed  May also take a probiotic of your choice to maintain healthy GI lon  Can take some probiotic and yogurt with the medication  Take prednisone with food in morning and do not take any NSAID's while taking prednisone  Return if symptoms worsen or fail to improve  Future Appointments   Date Time Provider Nick Hancock   12/7/2022  2:30 PM SHRAVAN Isaacs [facility]           Subjective:      Patient ID: Mukesh Juan is a 78 y o  female      Chief Complaint   Patient presents with   • Follow-up     Med refills hospital f/u-[facility]         Vitals:  BP 90/60   Pulse 92   Temp 97 5 °F (36 4 °C)   Resp 16   Ht 5' 5" (1 651 m)   Wt 73 kg (161 lb)   LMP  (LMP Unknown)   SpO2 94%   BMI 26 79 kg/m²   Wt Readings from Last 3 Encounters:   11/10/22 73 kg (161 lb)   10/13/22 77 1 kg (170 lb) 22 74 1 kg (163 lb 6 4 oz)      HPI  Patient stated that went to ER at [facility] on 10/31/2022 with aphasia and left sided numbness and stated that received TPA but MRI did not show any stroke  Patient has h/o cervical spine stenosis and had 2 surgeries in past and this repeat MRI showed worsening of that and was told that her symptoms possibly from that and scheduled to follow with neurosurgery  Patient was started on palvix and scheduled to follow with neurologist and will discuss with them  Complaint with medications  Stated that was diagnosed with RSV about a week ago at hospital and still having cough and running nose and wheezing at times  Denies fever, chills and sob  Taking protonix and ran out of it and needs refill until seen by gastro      PHQ-2/9 Depression Screening    Little interest or pleasure in doing things: 0 - not at all  Feeling down, depressed, or hopeless: 0 - not at all  Trouble falling or staying asleep, or sleeping too much: 0 - not at all  Feeling tired or having little energy: 0 - not at all  Poor appetite or overeatin - not at all  Feeling bad about yourself - or that you are a failure or have let yourself or your family down: 0 - not at all  Trouble concentrating on things, such as reading the newspaper or watching television: 0 - not at all  Moving or speaking so slowly that other people could have noticed   Or the opposite - being so fidgety or restless that you have been moving around a lot more than usual: 0 - not at all  Thoughts that you would be better off dead, or of hurting yourself in some way: 0 - not at all  PHQ-9 Score: 0   PHQ-9 Interpretation: No or Minimal depression              The following portions of the patient's history were reviewed and updated as appropriate: allergies, current medications, past family history, past medical history, past social history, past surgical history and problem list       Review of Systems   Constitutional: Negative for chills, diaphoresis, fatigue, fever and unexpected weight change  HENT: Positive for rhinorrhea  Negative for congestion, dental problem, drooling, ear discharge, ear pain, facial swelling, hearing loss, mouth sores, nosebleeds, postnasal drip, sinus pressure, sinus pain, sneezing, sore throat, tinnitus, trouble swallowing and voice change  Respiratory: Positive for cough and wheezing  Negative for chest tightness and shortness of breath  Cardiovascular: Negative  Gastrointestinal: Negative for abdominal pain, constipation, diarrhea, nausea and vomiting  Musculoskeletal: Negative  Skin: Negative  Neurological: Negative for dizziness, light-headedness and headaches  Objective:    Social History     Tobacco Use   Smoking Status Never Smoker   Smokeless Tobacco Never Used       Allergies:    Allergies   Allergen Reactions   • Meperidine Lightheadedness and Other (See Comments)   • Sulfa Antibiotics Hives         Current Outpatient Medications   Medication Sig Dispense Refill   • acetaminophen (TYLENOL) 650 mg CR tablet if needed     • albuterol (ProAir HFA) 90 mcg/act inhaler Inhale 2 puffs every 6 (six) hours as needed for wheezing 8 5 g 0   • albuterol (ProAir HFA) 90 mcg/act inhaler Inhale 2 puffs every 6 (six) hours as needed for wheezing 18 g 2   • atorvastatin (LIPITOR) 20 mg tablet TAKE 1 TABLET BY MOUTH EVERY DAY 90 tablet 2   • azithromycin (ZITHROMAX) 250 mg tablet Take 500mg on day 1, 250mg on days 2-5 6 tablet 0   • calcium carbonate (OS-WILLY) 1250 (500 Ca) MG tablet Take 1,250 mg by mouth     • clopidogrel (PLAVIX) 75 mg tablet Take 75 mg by mouth     • [facility] Aspirin Adult Low Strength 81 MG EC tablet CHEW AND SWALLOW 1 TABLET BY MOUTH ONCE DAILY     • Diclofenac Sodium (VOLTAREN) 1 % Apply 2 g topically 4 (four) times a day 150 g 0   • DULoxetine (CYMBALTA) 30 mg delayed release capsule Take 1 capsule (30 mg total) by mouth daily 90 capsule 1   • DULoxetine (CYMBALTA) 60 mg delayed release capsule Take 1 capsule (60 mg total) by mouth daily 90 capsule 1   • gabapentin (NEURONTIN) 300 mg capsule Take 2 capsules (600 mg total) by mouth 2 (two) times a day 120 capsule 6   • levothyroxine 50 mcg tablet TAKE 1 TABLET BY MOUTH EVERY DAY 90 tablet 0   • Magnesium 400 MG TABS Take by mouth daily     • methylPREDNISolone 4 MG tablet therapy pack Use as directed on package 21 tablet 0   • metoprolol succinate (TOPROL-XL) 25 mg 24 hr tablet Take 25 mg by mouth daily     • midodrine (PROAMATINE) 10 MG tablet TAKE 1 TABLET BY MOUTH THREE TIMES A  tablet 0   • pantoprazole (PROTONIX) 40 mg tablet Take 1 tablet (40 mg total) by mouth daily 30 tablet 0   • QUEtiapine (SEROquel) 100 mg tablet Take 1 tablet (100 mg total) by mouth daily at bedtime 90 tablet 1   • Sodium Fluoride 5000 Sensitive 1 1-5 % GEL As dir     • ALPRAZolam (XANAX) 0 5 mg tablet Take 0 5 mg by mouth 2 (two) times a day as needed (Patient not taking: No sig reported)       No current facility-administered medications for this visit  Physical Exam  Vitals reviewed  Constitutional:       Appearance: Normal appearance  She is well-developed  HENT:      Head: Normocephalic  Right Ear: Tympanic membrane, ear canal and external ear normal       Left Ear: Tympanic membrane, ear canal and external ear normal       Nose: Nose normal       Right Sinus: No maxillary sinus tenderness or frontal sinus tenderness  Left Sinus: No maxillary sinus tenderness or frontal sinus tenderness  Mouth/Throat:      Mouth: No oral lesions  Pharynx: No oropharyngeal exudate or posterior oropharyngeal erythema  Cardiovascular:      Rate and Rhythm: Normal rate and regular rhythm  Heart sounds: Normal heart sounds  Pulmonary:      Effort: Pulmonary effort is normal       Breath sounds: Wheezing present  Musculoskeletal:         General: Normal range of motion  Cervical back: Neck supple     Lymphadenopathy:      Cervical: Right cervical: No superficial or posterior cervical adenopathy  Left cervical: No superficial or posterior cervical adenopathy  Skin:     General: Skin is warm and dry  Neurological:      Mental Status: She is alert and oriented to person, place, and time  Psychiatric:         Behavior: Behavior normal          Thought Content:  Thought content normal          Judgment: Judgment normal                      SHRAVAN Santos

## 2022-11-21 ENCOUNTER — TELEPHONE (OUTPATIENT)
Dept: PSYCHIATRY | Facility: CLINIC | Age: 67
End: 2022-11-21

## 2022-11-21 NOTE — TELEPHONE ENCOUNTER
Reached out to pt in regards to transfer of care from [facility] to a new provider, pt stated she would like to continue care in the [facility] office

## 2022-11-22 ENCOUNTER — TELEPHONE (OUTPATIENT)
Dept: PSYCHIATRY | Facility: CLINIC | Age: 67
End: 2022-11-22

## 2022-11-22 NOTE — TELEPHONE ENCOUNTER
Left message for pt to return call to intake in regards to transfer of care from [facility] to a new provider

## 2022-11-24 ENCOUNTER — APPOINTMENT (EMERGENCY)
Dept: RADIOLOGY | Facility: HOSPITAL | Age: 67
End: 2022-11-24

## 2022-11-24 ENCOUNTER — HOSPITAL ENCOUNTER (OUTPATIENT)
Facility: HOSPITAL | Age: 67
Setting detail: OBSERVATION
End: 2022-11-26
Attending: EMERGENCY MEDICINE | Admitting: STUDENT IN AN ORGANIZED HEALTH CARE EDUCATION/TRAINING PROGRAM

## 2022-11-24 DIAGNOSIS — R41.82 ALTERED MENTAL STATUS: Primary | ICD-10-CM

## 2022-11-24 DIAGNOSIS — F32.9 MAJOR DEPRESSION, SINGLE EPISODE: ICD-10-CM

## 2022-11-24 PROBLEM — T68.XXXA HYPOTHERMIA: Status: ACTIVE | Noted: 2022-11-24

## 2022-11-24 LAB
ALBUMIN SERPL BCP-MCNC: 3.4 G/DL (ref 3.5–5)
ALP SERPL-CCNC: 72 U/L (ref 46–116)
ALT SERPL W P-5'-P-CCNC: 47 U/L (ref 12–78)
AMPHETAMINES SERPL QL SCN: NEGATIVE
ANION GAP SERPL CALCULATED.3IONS-SCNC: 7 MMOL/L (ref 4–13)
APAP SERPL-MCNC: <2 UG/ML (ref 10–20)
AST SERPL W P-5'-P-CCNC: 21 U/L (ref 5–45)
BARBITURATES UR QL: NEGATIVE
BASOPHILS # BLD AUTO: 0.02 THOUSANDS/ÂΜL (ref 0–0.1)
BASOPHILS NFR BLD AUTO: 0 % (ref 0–1)
BENZODIAZ UR QL: NEGATIVE
BILIRUB SERPL-MCNC: 0.32 MG/DL (ref 0.2–1)
BILIRUB UR QL STRIP: NEGATIVE
BUN SERPL-MCNC: 20 MG/DL (ref 5–25)
CALCIUM ALBUM COR SERPL-MCNC: 9.3 MG/DL (ref 8.3–10.1)
CALCIUM SERPL-MCNC: 8.8 MG/DL (ref 8.3–10.1)
CHLORIDE SERPL-SCNC: 109 MMOL/L (ref 96–108)
CLARITY UR: CLEAR
CO2 SERPL-SCNC: 29 MMOL/L (ref 21–32)
COCAINE UR QL: NEGATIVE
COLOR UR: YELLOW
CREAT SERPL-MCNC: 0.89 MG/DL (ref 0.6–1.3)
EOSINOPHIL # BLD AUTO: 0.12 THOUSAND/ÂΜL (ref 0–0.61)
EOSINOPHIL NFR BLD AUTO: 2 % (ref 0–6)
ERYTHROCYTE [DISTWIDTH] IN BLOOD BY AUTOMATED COUNT: 15.3 % (ref 11.6–15.1)
ETHANOL SERPL-MCNC: <3 MG/DL (ref 0–3)
FLUAV RNA RESP QL NAA+PROBE: NEGATIVE
FLUBV RNA RESP QL NAA+PROBE: NEGATIVE
GFR SERPL CREATININE-BSD FRML MDRD: 67 ML/MIN/1.73SQ M
GLUCOSE SERPL-MCNC: 135 MG/DL (ref 65–140)
GLUCOSE SERPL-MCNC: 144 MG/DL (ref 65–140)
GLUCOSE UR STRIP-MCNC: NEGATIVE MG/DL
HCT VFR BLD AUTO: 40.5 % (ref 34.8–46.1)
HGB BLD-MCNC: 12.7 G/DL (ref 11.5–15.4)
HGB UR QL STRIP.AUTO: NEGATIVE
IMM GRANULOCYTES # BLD AUTO: 0.02 THOUSAND/UL (ref 0–0.2)
IMM GRANULOCYTES NFR BLD AUTO: 0 % (ref 0–2)
KETONES UR STRIP-MCNC: NEGATIVE MG/DL
LEUKOCYTE ESTERASE UR QL STRIP: NEGATIVE
LYMPHOCYTES # BLD AUTO: 1.34 THOUSANDS/ÂΜL (ref 0.6–4.47)
LYMPHOCYTES NFR BLD AUTO: 24 % (ref 14–44)
MCH RBC QN AUTO: 27.3 PG (ref 26.8–34.3)
MCHC RBC AUTO-ENTMCNC: 31.4 G/DL (ref 31.4–37.4)
MCV RBC AUTO: 87 FL (ref 82–98)
METHADONE UR QL: NEGATIVE
MONOCYTES # BLD AUTO: 0.48 THOUSAND/ÂΜL (ref 0.17–1.22)
MONOCYTES NFR BLD AUTO: 9 % (ref 4–12)
NEUTROPHILS # BLD AUTO: 3.52 THOUSANDS/ÂΜL (ref 1.85–7.62)
NEUTS SEG NFR BLD AUTO: 65 % (ref 43–75)
NITRITE UR QL STRIP: NEGATIVE
NRBC BLD AUTO-RTO: 0 /100 WBCS
OPIATES UR QL SCN: NEGATIVE
OXYCODONE+OXYMORPHONE UR QL SCN: NEGATIVE
PCP UR QL: NEGATIVE
PH UR STRIP.AUTO: 7.5 [PH]
PLATELET # BLD AUTO: 225 THOUSANDS/UL (ref 149–390)
PMV BLD AUTO: 10.1 FL (ref 8.9–12.7)
POTASSIUM SERPL-SCNC: 4.3 MMOL/L (ref 3.5–5.3)
PROT SERPL-MCNC: 6.4 G/DL (ref 6.4–8.4)
PROT UR STRIP-MCNC: NEGATIVE MG/DL
RBC # BLD AUTO: 4.65 MILLION/UL (ref 3.81–5.12)
RSV RNA RESP QL NAA+PROBE: NEGATIVE
SALICYLATES SERPL-MCNC: <3 MG/DL (ref 3–20)
SARS-COV-2 RNA RESP QL NAA+PROBE: NEGATIVE
SODIUM SERPL-SCNC: 145 MMOL/L (ref 135–147)
SP GR UR STRIP.AUTO: <=1.005 (ref 1–1.03)
THC UR QL: NEGATIVE
TSH SERPL DL<=0.05 MIU/L-ACNC: 1.98 UIU/ML (ref 0.45–4.5)
UROBILINOGEN UR QL STRIP.AUTO: 0.2 E.U./DL
WBC # BLD AUTO: 5.5 THOUSAND/UL (ref 4.31–10.16)

## 2022-11-24 RX ORDER — ONDANSETRON 2 MG/ML
4 INJECTION INTRAMUSCULAR; INTRAVENOUS EVERY 6 HOURS PRN
Status: DISCONTINUED | OUTPATIENT
Start: 2022-11-24 | End: 2022-11-26 | Stop reason: HOSPADM

## 2022-11-24 RX ORDER — ALBUTEROL SULFATE 90 UG/1
2 AEROSOL, METERED RESPIRATORY (INHALATION) EVERY 6 HOURS PRN
Status: DISCONTINUED | OUTPATIENT
Start: 2022-11-24 | End: 2022-11-26 | Stop reason: HOSPADM

## 2022-11-24 RX ORDER — ATORVASTATIN CALCIUM 20 MG/1
20 TABLET, FILM COATED ORAL
Status: DISCONTINUED | OUTPATIENT
Start: 2022-11-24 | End: 2022-11-25

## 2022-11-24 RX ORDER — CLOPIDOGREL BISULFATE 75 MG/1
75 TABLET ORAL DAILY
Status: DISCONTINUED | OUTPATIENT
Start: 2022-11-24 | End: 2022-11-26 | Stop reason: HOSPADM

## 2022-11-24 RX ORDER — ENOXAPARIN SODIUM 100 MG/ML
40 INJECTION SUBCUTANEOUS DAILY
Status: DISCONTINUED | OUTPATIENT
Start: 2022-11-24 | End: 2022-11-26 | Stop reason: HOSPADM

## 2022-11-24 RX ORDER — MIDODRINE HYDROCHLORIDE 5 MG/1
10 TABLET ORAL 3 TIMES DAILY
Status: DISCONTINUED | OUTPATIENT
Start: 2022-11-24 | End: 2022-11-26 | Stop reason: HOSPADM

## 2022-11-24 RX ORDER — LEVOTHYROXINE SODIUM 0.05 MG/1
50 TABLET ORAL DAILY
Status: DISCONTINUED | OUTPATIENT
Start: 2022-11-25 | End: 2022-11-26 | Stop reason: HOSPADM

## 2022-11-24 RX ORDER — ASPIRIN 81 MG/1
81 TABLET ORAL DAILY
Status: DISCONTINUED | OUTPATIENT
Start: 2022-11-24 | End: 2022-11-26 | Stop reason: HOSPADM

## 2022-11-24 RX ORDER — ACETAMINOPHEN 325 MG/1
650 TABLET ORAL EVERY 6 HOURS PRN
Status: DISCONTINUED | OUTPATIENT
Start: 2022-11-24 | End: 2022-11-26 | Stop reason: HOSPADM

## 2022-11-24 RX ORDER — KETOROLAC TROMETHAMINE 30 MG/ML
15 INJECTION, SOLUTION INTRAMUSCULAR; INTRAVENOUS ONCE
Status: COMPLETED | OUTPATIENT
Start: 2022-11-24 | End: 2022-11-24

## 2022-11-24 RX ORDER — PANTOPRAZOLE SODIUM 40 MG/1
40 TABLET, DELAYED RELEASE ORAL DAILY
Status: DISCONTINUED | OUTPATIENT
Start: 2022-11-25 | End: 2022-11-26 | Stop reason: HOSPADM

## 2022-11-24 RX ORDER — DULOXETIN HYDROCHLORIDE 30 MG/1
30 CAPSULE, DELAYED RELEASE ORAL DAILY
Status: DISCONTINUED | OUTPATIENT
Start: 2022-11-24 | End: 2022-11-26 | Stop reason: HOSPADM

## 2022-11-24 RX ORDER — CALCIUM CARBONATE 500(1250)
1 TABLET ORAL
Status: DISCONTINUED | OUTPATIENT
Start: 2022-11-25 | End: 2022-11-26 | Stop reason: HOSPADM

## 2022-11-24 RX ORDER — METOPROLOL SUCCINATE 25 MG/1
25 TABLET, EXTENDED RELEASE ORAL DAILY
Status: DISCONTINUED | OUTPATIENT
Start: 2022-11-24 | End: 2022-11-26 | Stop reason: HOSPADM

## 2022-11-24 RX ADMIN — ENOXAPARIN SODIUM 40 MG: 40 INJECTION SUBCUTANEOUS at 13:18

## 2022-11-24 RX ADMIN — KETOROLAC TROMETHAMINE 15 MG: 30 INJECTION, SOLUTION INTRAMUSCULAR at 10:54

## 2022-11-24 RX ADMIN — MIDODRINE HYDROCHLORIDE 10 MG: 5 TABLET ORAL at 21:16

## 2022-11-24 RX ADMIN — DULOXETINE HYDROCHLORIDE 30 MG: 30 CAPSULE, DELAYED RELEASE ORAL at 13:18

## 2022-11-24 RX ADMIN — IOHEXOL 85 ML: 350 INJECTION, SOLUTION INTRAVENOUS at 08:11

## 2022-11-24 RX ADMIN — ASPIRIN 81 MG: 81 TABLET ORAL at 13:19

## 2022-11-24 RX ADMIN — CLOPIDOGREL BISULFATE 75 MG: 75 TABLET ORAL at 13:18

## 2022-11-24 RX ADMIN — MIDODRINE HYDROCHLORIDE 10 MG: 5 TABLET ORAL at 17:41

## 2022-11-24 RX ADMIN — DICLOFENAC SODIUM TOPICAL GEL, 1%, 2 G: 10 GEL TOPICAL at 17:41

## 2022-11-24 RX ADMIN — ATORVASTATIN CALCIUM 20 MG: 20 TABLET, FILM COATED ORAL at 17:41

## 2022-11-24 RX ADMIN — MAGNESIUM OXIDE TAB 400 MG (241.3 MG ELEMENTAL MG) 400 MG: 400 (241.3 MG) TAB at 13:18

## 2022-11-24 RX ADMIN — INFLUENZA A VIRUS A/VICTORIA/2570/2019 IVR-215 (H1N1) ANTIGEN (FORMALDEHYDE INACTIVATED), INFLUENZA A VIRUS A/DARWIN/9/2021 SAN-010 (H3N2) ANTIGEN (FORMALDEHYDE INACTIVATED), INFLUENZA B VIRUS B/PHUKET/3073/2013 ANTIGEN (FORMALDEHYDE INACTIVATED), AND INFLUENZA B VIRUS B/MICHIGAN/01/2021 ANTIGEN (FORMALDEHYDE INACTIVATED) 0.7 ML: 60; 60; 60; 60 INJECTION, SUSPENSION INTRAMUSCULAR at 13:18

## 2022-11-24 RX ADMIN — METOPROLOL SUCCINATE 25 MG: 25 TABLET, EXTENDED RELEASE ORAL at 13:18

## 2022-11-24 NOTE — ASSESSMENT & PLAN NOTE
Patient has a history of anxiety, appears extremely anxious during interview  Supportive care with verbal reassurance    Would hold off on any sedating medications secondary to altered mental status

## 2022-11-24 NOTE — ASSESSMENT & PLAN NOTE
Hypothermia present on admission as evidence by temperature of 14 5° requiring application of Qi Hugger  Continue to monitor, when temperature normalizes discontinue Qi Hugger

## 2022-11-24 NOTE — ASSESSMENT & PLAN NOTE
Patient has a history of depression, as noted above  reported that patient had been following with outpatient psychiatry but stopped following with them about 1 month ago  Will hold Seroquel at this time secondary to altered mental status  May consider consultation with Psychiatry when patient wakes up more will discuss with her    Supportive care

## 2022-11-24 NOTE — H&P
Geovani 46  H&P- Claudy Emanuelmaria l 1955, 78 y o  female MRN: [id number]  Unit/Bed#: ED CT2 Encounter: [id number]  Primary Care Provider: Loulou Welsh MD   Date and time admitted to hospital: 11/24/2022  7:27 AM    * AMS (altered mental status)  Assessment & Plan  Patient presented to hospital secondary to altered mental status changes noticed by  at home  Patient poor historian, history is obtained by discussing with  on phone  He indicated that he went to bed around 10:00 p m , and at the time his wife was at baseline  He got up during the night and found her sitting in a chair instead of sleeping in their bed  He reported that he went to the bathroom and when he came back she was lying on the floor, crying  He asked her what had happened and she kept on telling him over and over again," I do not know"  He was suspicious initially that she fell and hit her head, tripping over 1 of their large dogs  This prompted him to bring the patient to the emergency department  In the ED CTA of head and neck was performed which was negative  Chest x-ray was also negative  Urine drug screen pending  When asked directly patient denied illicit drug use  She was uncertain if perhaps she had taken extra of her medications, suspect possible polypharmacy? Unintentional overdose? Patient denied suicidal ideation  As per discussion with ER nurse patient is more awake and verbal than when she 1st presented  Will continue neuro checks q 4 hours  Patient extremely tearful during exam, kept repeating, “I do not know what happened”  Case discussed with  via phone, he indicated in the 15 years that they have been , 8 of those 15 years the patient has wound up in the hospital on Thanksgiving, or other holidays during the year with similar symptoms    Reports that patient had been following with outpatient psychiatry, reported that a month ago she told him that she no longer needed to be followed by Psychiatry  May consider consultation with Psychiatry              Hypothermia  Assessment & Plan  Hypothermia present on admission as evidence by temperature of 60 2° requiring application of Qi Hugger  Continue to monitor, when temperature normalizes discontinue Qi Hugger  Depression  Assessment & Plan  Patient has a history of depression, as noted above  reported that patient had been following with outpatient psychiatry but stopped following with them about 1 month ago  Will hold Seroquel at this time secondary to altered mental status  May consider consultation with Psychiatry when patient wakes up more will discuss with her  Supportive care    Adult hypothyroidism  Assessment & Plan  Patient has a history of hypothyroidism  Continue levothyroxine 50 mcg  TSH ordered  Orthostatic hypotension  Assessment & Plan  Patient has a history of orthostatic hypotension  Does take midodrine 10 mg t i d , will continue  Orthostatic blood pressures ordered  Ruben stockings when out of bed    Anxiety  Assessment & Plan  Patient has a history of anxiety, appears extremely anxious during interview  Supportive care with verbal reassurance  Would hold off on any sedating medications secondary to altered mental status    VTE Pharmacologic Prophylaxis: VTE Score: 3 Moderate Risk (Score 3-4) - Pharmacological DVT Prophylaxis Ordered: enoxaparin (Lovenox)  Code Status: Prior Full code   Discussion with family: Updated  () via phone  Anticipated Length of Stay: Patient will be admitted on an observation basis with an anticipated length of stay of less than 2 midnights secondary to ams, neuro checks, repeat labs  Total Time for Visit, including Counseling / Coordination of Care: 60 minutes Greater than 50% of this total time spent on direct patient counseling and coordination of care      Chief Complaint: AMS    History of Present Illness:  Loulou Hathorne Bella Bernstein is a 78 y o  female with a PMH of cervical myopathy, fibromyalgia, anxiety, orthostasis, loop recorder, GERD, depression, hyperlipidemia and hypothyroidism who presents with altered mental status change  Patient is a poor historian, history was obtained via phone from   Indicated that he went to bed around 10:00 p m  And at that time his wife was at her baseline  He got up during the night found her sitting in a chair instead of sleeping in their bed  He went to the bathroom and when he came back she was lying on the floor crying, unable to tell him what happened  He was suspicious that she had tripped over 1 of their dogs and brought her to the emergency department for further evaluation treatment  In the ED CTA of head and neck was performed which was negative, chest x-ray also negative  Urine drug screen is pending  During exam patient continues to cry, repeating over and over, “I do not know”  Labs were unremarkable  Patient noted to have hypothermia 96 3, Qi Evans applied  Will check TSH as patient has a history of noncompliance with her thyroid medication in the past   Will continue neuro checks q 4 hours, no focal deficits noted at this time  During conversation with  on the phone he indicated that the patient has had similar instances over the past 15 years where she has been hospitalized mostly on Thanksgiving or other holidays secondary to mental distress  He reported that the patient had been followed by outpatient psychiatry and approximately 1 month ago she informed him that she no longer needed to follow with Psychiatry  May consider psychiatric consultation when patient is more awake  Will hold patient's gabapentin and Seroquel at this time secondary to altered mental status changes  Code status was also discussed with the patient's  and he indicated that patient is to be a full code      Review of Systems:  Review of Systems   Unable to perform ROS: Mental status change   Patient crying, repeating over and over, "I do not know"  Past Medical and Surgical History:   Past Medical History:   Diagnosis Date   • Abdominal adhesions    • Anesthesia complication     difficult to wake up   • Anxiety    • Arthritis    • Cancer (HCC)     melanoma   • Depression    • Depression    • Disease of thyroid gland    • Fibromyalgia    • Fibromyalgia, primary    • Fractures 2006   • GERD (gastroesophageal reflux disease)    • History of cholecystectomy 09/07/2019   • Hyperlipidemia    • Irregular heart beat     can be tachy; also has orthoststic hypotension   • Lazy eye     resolved: 3/27/17   • Medical clearance for psychiatric admission 07/13/2021   • Melanoma ([facility] [address] ) 2010   • Osteoarthritis 07/2018   • Osteopenia     with joint pain-elevated DEV   • Psychiatric disorder    • Shortness of breath     assoc with tachycardia   • Stomach disorder 1995   • Tinnitus    • Wears glasses     for reading       Past Surgical History:   Procedure Laterality Date   • ABDOMINAL SURGERY      lysis of adhesions x 2   • APPENDECTOMY     • CERVICAL FUSION      May 5,2021 and Jan 01,2020   • CHOLECYSTECTOMY      open   • COLONOSCOPY     • DILATION AND CURETTAGE OF UTERUS     • FOOT SURGERY  05/2017   • NECK SURGERY  01/2019 5/2021   • NEUROMA EXCISION Right 05/26/2017    Procedure: EXCISION MASS / FIBROMA FOOT;  Surgeon: Josemanuel Reeves DPM;  Location: [address] [place] [address];  Service:    • PARS PLANA VITRECTOMY W/ REPAIR OF MACULAR HOLE  12/23/2020   • [place] XCAPSL CTRC RMVL INSJ IO LENS PROSTH W/O ECP Left 12/06/2021    Procedure: EXTRACTION EXTRACAPSULAR CATARACT PHACO INTRAOCULAR LENS (IOL); Surgeon: Tonio Wolf MD;  Location: [facility] MAIN OR;  Service: Ophthalmology   • RIGHT OOPHORECTOMY     • WISDOM TOOTH EXTRACTION      x4       Meds/Allergies:  Prior to Admission medications    Medication Sig Start Date End Date Taking?  Authorizing Provider   acetaminophen (TYLENOL) 650 mg CR tablet if needed Historical Provider, MD   albuterol (ProAir HFA) 90 mcg/act inhaler Inhale 2 puffs every 6 (six) hours as needed for wheezing 5/21/22   Carmen Scanlon DO   albuterol (ProAir HFA) 90 mcg/act inhaler Inhale 2 puffs every 6 (six) hours as needed for wheezing 11/10/22   SHRAVAN Connell   ALPRAZolam Davene Novel) 0 5 mg tablet Take 0 5 mg by mouth 2 (two) times a day as needed  Patient not taking: No sig reported 5/23/21   Historical Provider, MD   atorvastatin (LIPITOR) 20 mg tablet TAKE 1 TABLET BY MOUTH EVERY DAY 9/12/22   Dylan Tellez MD   calcium carbonate (OS-WILLY) 1250 (500 Ca) MG tablet Take 1,250 mg by mouth    Historical Provider, MD   clopidogrel (PLAVIX) 75 mg tablet Take 75 mg by mouth 11/4/22 11/24/22  Historical Provider, MD   [facility] Aspirin Adult Low Strength 81 MG EC tablet CHEW AND SWALLOW 1 TABLET BY MOUTH ONCE DAILY 11/4/22   Historical Provider, MD   Diclofenac Sodium (VOLTAREN) 1 % Apply 2 g topically 4 (four) times a day 10/13/22   Fidencio Walden DO   DULoxetine (CYMBALTA) 30 mg delayed release capsule Take 1 capsule (30 mg total) by mouth daily 8/28/22 2/24/23  YURI Horton   DULoxetine (CYMBALTA) 60 mg delayed release capsule Take 1 capsule (60 mg total) by mouth daily 8/28/22   YURI Horton   gabapentin (NEURONTIN) 300 mg capsule Take 2 capsules (600 mg total) by mouth 2 (two) times a day 6/7/22   Fabien Whitman MD   levothyroxine 50 mcg tablet TAKE 1 TABLET BY MOUTH EVERY DAY 10/28/22   SHRAVAN Connell   Magnesium 400 MG TABS Take by mouth daily    Historical Provider, MD   methylPREDNISolone 4 MG tablet therapy pack Use as directed on package 11/10/22   SHRAVAN Connell   metoprolol succinate (TOPROL-XL) 25 mg 24 hr tablet Take 25 mg by mouth daily 2/25/22   Historical Provider, MD   midodrine (PROAMATINE) 10 MG tablet TAKE 1 TABLET BY MOUTH THREE TIMES A DAY 10/3/22   Fabien Whitman MD   pantoprazole (PROTONIX) 40 mg tablet Take 1 tablet (40 mg total) by mouth daily 11/10/22   Karyn Mendoza SHRAVAN Green   QUEtiapine (SEROquel) 100 mg tablet Take 1 tablet (100 mg total) by mouth daily at bedtime 8/28/22 2/24/23  YURI Corral   Sodium Fluoride 5000 Sensitive 1 1-5 % GEL As dir 11/9/21   Historical Provider, MD QUEEN have reviewed home medications using recent Epic encounter  Allergies:    Allergies   Allergen Reactions   • Meperidine Lightheadedness and Other (See Comments)   • Sulfa Antibiotics Hives       Social History:  Marital Status: /Civil Union   Occupation:    Patient Pre-hospital Living Situation: Home  Patient Pre-hospital Level of Mobility: walks  Patient Pre-hospital Diet Restrictions: None   Substance Use History:   Social History     Substance and Sexual Activity   Alcohol Use No     Social History     Tobacco Use   Smoking Status Never   Smokeless Tobacco Never     Social History     Substance and Sexual Activity   Drug Use No       Family History:  Family History   Problem Relation Age of Onset   • Heart disease Mother    • Stroke Mother 58   • COPD Mother    • Arthritis Mother    • Hypertension Father    • Kidney disease Brother         kidney transplant   • Multiple sclerosis Sister    • No Known Problems Maternal Aunt    • No Known Problems Maternal Uncle    • No Known Problems Paternal Aunt    • No Known Problems Paternal Uncle    • No Known Problems Maternal Grandmother    • No Known Problems Maternal Grandfather    • No Known Problems Paternal Grandmother    • No Known Problems Paternal Grandfather    • Dislocations Sister    • Neurological problems Sister    • Scoliosis Sister    • ADD / ADHD Neg Hx    • Anesthesia problems Neg Hx    • Cancer Neg Hx    • Clotting disorder Neg Hx    • Collagen disease Neg Hx    • Diabetes Neg Hx    • Dislocations Neg Hx    • Learning disabilities Neg Hx    • Neurological problems Neg Hx    • Osteoporosis Neg Hx    • Rheumatologic disease Neg Hx    • Scoliosis Neg Hx    • Vascular Disease Neg Hx        Physical Exam: Vitals:   Blood Pressure: 135/71 (11/24/22 1015)  Pulse: 68 (11/24/22 1015)  Temperature: (!) 96 3 °F (35 7 °C) (11/24/22 0923)  Temp Source: Rectal (11/24/22 0923)  Respirations: 20 (11/24/22 1015)  Height: 5' 5" (165 1 cm) (11/24/22 0733)  Weight - Scale: 74 3 kg (163 lb 12 8 oz) (11/24/22 0733)  SpO2: 95 % (11/24/22 1015)    Physical Exam  Vitals and nursing note reviewed  Constitutional:       Comments: Patient extremely tearful during exam, kept repeating "I don't know what happened"  HENT:      Head: Normocephalic  Nose: Nose normal       Mouth/Throat:      Mouth: Mucous membranes are dry  Eyes:      Extraocular Movements: Extraocular movements intact  Comments: Right pupil larger than left; hx ocular surgery    Cardiovascular:      Rate and Rhythm: Normal rate and regular rhythm  Pulses: Normal pulses  Heart sounds: Normal heart sounds  Pulmonary:      Effort: Pulmonary effort is normal       Breath sounds: Normal breath sounds  Abdominal:      General: Bowel sounds are normal  There is no distension  Palpations: Abdomen is soft  Tenderness: There is no abdominal tenderness  Genitourinary:     Comments: Voiding; purewick in place   Musculoskeletal:      Cervical back: Normal range of motion  Right lower leg: No edema  Left lower leg: No edema  Comments: Patient reported left leg cramp; has restless legs which  reports is her baseline   Skin:     General: Skin is warm and dry  Capillary Refill: Capillary refill takes less than 2 seconds  Neurological:      General: No focal deficit present  Comments: Patient able to tell me location, year, could not give any insight as to why she was here; repeating "I don't know" over and over again  Able to move all extremities, follow commands  Psychiatric:         Mood and Affect: Mood is anxious  Affect is flat and tearful  Behavior: Behavior is slowed           Thought Content: Thought content does not include suicidal ideation  Additional Data:     Lab Results:  Results from last 7 days   Lab Units 11/24/22  0812   WBC Thousand/uL 5 50   HEMOGLOBIN g/dL 12 7   HEMATOCRIT % 40 5   PLATELETS Thousands/uL 225   NEUTROS PCT % 65   LYMPHS PCT % 24   MONOS PCT % 9   EOS PCT % 2     Results from last 7 days   Lab Units 11/24/22  0812   SODIUM mmol/L 145   POTASSIUM mmol/L 4 3   CHLORIDE mmol/L 109*   CO2 mmol/L 29   BUN mg/dL 20   CREATININE mg/dL 0 89   ANION GAP mmol/L 7   CALCIUM mg/dL 8 8   ALBUMIN g/dL 3 4*   TOTAL BILIRUBIN mg/dL 0 32   ALK PHOS U/L 72   ALT U/L 47   AST U/L 21   GLUCOSE RANDOM mg/dL 144*         Results from last 7 days   Lab Units 11/24/22  0738   POC GLUCOSE mg/dl 135               Lines/Drains:  Invasive Devices     Peripheral Intravenous Line  Duration           Peripheral IV 11/24/22 Right Antecubital <1 day                    Imaging: Reviewed radiology reports from this admission including: chest xray and CT head  XR chest 1 view portable   Final Result by Loraine Olivares MD (11/24 8852)      No acute cardiopulmonary disease  Workstation performed: [id number]         CTA head and neck w wo contrast   Final Result by Orly 6, DO (11/24 0900)      No acute intracranial abnormality  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

## 2022-11-24 NOTE — PLAN OF CARE
Problem: Potential for Falls  Goal: Patient will remain free of falls  Description: INTERVENTIONS:  - Educate patient/family on patient safety including physical limitations  - Instruct patient to call for assistance with activity   - Consult OT/PT to assist with strengthening/mobility   - Keep Call bell within reach  - Keep bed low and locked with side rails adjusted as appropriate  - Keep care items and personal belongings within reach  - Initiate and maintain comfort rounds  - Make Fall Risk Sign visible to staff  - Offer Toileting every 2 Hours, in advance of need  - Initiate/Maintain bed/chair alarm  - Obtain necessary fall risk management equipment: bed/chair alarm  - Apply yellow socks and bracelet for high fall risk patients  - Consider moving patient to room near nurses station  Outcome: Progressing     Problem: PAIN - ADULT  Goal: Verbalizes/displays adequate comfort level or baseline comfort level  Description: Interventions:  - Encourage patient to monitor pain and request assistance  - Assess pain using appropriate pain scale  - Administer analgesics based on type and severity of pain and evaluate response  - Implement non-pharmacological measures as appropriate and evaluate response  - Consider cultural and social influences on pain and pain management  - Notify physician/advanced practitioner if interventions unsuccessful or patient reports new pain  Outcome: Progressing     Problem: INFECTION - ADULT  Goal: Absence or prevention of progression during hospitalization  Description: INTERVENTIONS:  - Assess and monitor for signs and symptoms of infection  - Monitor lab/diagnostic results  - Monitor all insertion sites, i e  indwelling lines, tubes, and drains  - Monitor endotracheal if appropriate and nasal secretions for changes in amount and color  - [place] appropriate cooling/warming therapies per order  - Administer medications as ordered  - Instruct and encourage patient and family to use good hand hygiene technique  - Identify and instruct in appropriate isolation precautions for identified infection/condition  Outcome: Progressing     Problem: SAFETY ADULT  Goal: Patient will remain free of falls  Description: INTERVENTIONS:  - Educate patient/family on patient safety including physical limitations  - Instruct patient to call for assistance with activity   - Consult OT/PT to assist with strengthening/mobility   - Keep Call bell within reach  - Keep bed low and locked with side rails adjusted as appropriate  - Keep care items and personal belongings within reach  - Initiate and maintain comfort rounds  - Make Fall Risk Sign visible to staff  - Offer Toileting every 2 Hours, in advance of need  - Initiate/Maintain bed/chair alarm  - Obtain necessary fall risk management equipment: bed/chair alarm  - Apply yellow socks and bracelet for high fall risk patients  - Consider moving patient to room near nurses station  Outcome: Progressing  Goal: Maintain or return to baseline ADL function  Description: INTERVENTIONS:  -  Assess patient's ability to carry out ADLs; assess patient's baseline for ADL function and identify physical deficits which impact ability to perform ADLs (bathing, care of mouth/teeth, toileting, grooming, dressing, etc )  - Assess/evaluate cause of self-care deficits   - Assess range of motion  - Assess patient's mobility; develop plan if impaired  - Assess patient's need for assistive devices and provide as appropriate  - Encourage maximum independence but intervene and supervise when necessary  - Involve family in performance of ADLs  - Assess for home care needs following discharge   - Consider OT consult to assist with ADL evaluation and planning for discharge  - Provide patient education as appropriate  Outcome: Progressing  Goal: Maintains/Returns to pre admission functional level  Description: INTERVENTIONS:  - Perform BMAT or MOVE assessment daily    - Set and communicate daily mobility goal to care team and patient/family/caregiver  - Collaborate with rehabilitation services on mobility goals if consulted  - Perform Range of Motion 3 times a day  - Reposition patient every  3 hours  - Dangle patient 3 times a day  - Stand patient 3 times a day  - Ambulate patient 3 times a day  - Out of bed to chair 3 times a day   - Out of bed for meals 3 times a day  - Out of bed for toileting  - Record patient progress and toleration of activity level   Outcome: Progressing     Problem: DISCHARGE PLANNING  Goal: Discharge to home or other facility with appropriate resources  Description: INTERVENTIONS:  - Identify barriers to discharge w/patient and caregiver  - Arrange for needed discharge resources and transportation as appropriate  - Identify discharge learning needs (meds, wound care, etc )  - Arrange for interpretive services to assist at discharge as needed  - Refer to Case Management Department for coordinating discharge planning if the patient needs post-hospital services based on physician/advanced practitioner order or complex needs related to functional status, cognitive ability, or social support system  Outcome: Progressing     Problem: Knowledge Deficit  Goal: Patient/family/caregiver demonstrates understanding of disease process, treatment plan, medications, and discharge instructions  Description: Complete learning assessment and assess knowledge base    Interventions:  - Provide teaching at level of understanding  - Provide teaching via preferred learning methods  Outcome: Progressing

## 2022-11-24 NOTE — ASSESSMENT & PLAN NOTE
Patient has a history of orthostatic hypotension  Does take midodrine 10 mg t i d , will continue  Orthostatic blood pressures ordered    Ruben stockings when out of bed

## 2022-11-24 NOTE — ED PROVIDER NOTES
History  Chief Complaint   Patient presents with   • Altered Mental Status      states   that last Known well is  Last night 10 pm   Pt  Was found by hium today crying on the floor  Pt  Is very confused states headache and  states vomitted  on ride over here  HPI  Patient is a 60-year-old female history of anxiety, depression, fibromyalgia presenting for evaluation of altered mental status  Patient was apparently found sitting on the floor with her head down crying this morning, seemed drowsy and out of it and less responsive to her   Patient was found immediately prior to being brought to the emergency department  Patient's last normal was in the evening before going to bed and patient apparently slept in chair  Patient was at her baseline the previous day without medical complaint  Emergency department, patient drowsy, tearful, states that she feels anxious and that everything hurts  Patient does not further elaborate on this  Patient's  did not finding empty pill bottles, patient is able to deny any potential overdose, SI/HI/AH/VH  Patient has had several similar episodes in the past prompting head imaging, was admitted to [address] [place] [address] ICU following [facility] admission ultimately with unremarkable imaging including CTA and MRI with return to baseline  Per patient's , this is happen approximately 10 times in the past without a known cause  Prior to Admission Medications   Prescriptions Last Dose Informant Patient Reported? Taking?    [facility] Aspirin Adult Low Strength 81 MG EC tablet 11/23/2022  Yes Yes   Sig: CHEW AND SWALLOW 1 TABLET BY MOUTH ONCE DAILY   DULoxetine (CYMBALTA) 30 mg delayed release capsule 11/23/2022  No Yes   Sig: Take 1 capsule (30 mg total) by mouth daily   DULoxetine (CYMBALTA) 60 mg delayed release capsule 11/23/2022  No Yes   Sig: Take 1 capsule (60 mg total) by mouth daily   Magnesium 400 MG TABS 11/23/2022  Yes Yes   Sig: Take by mouth daily QUEtiapine (SEROquel) 100 mg tablet 11/23/2022  No Yes   Sig: Take 1 tablet (100 mg total) by mouth daily at bedtime   acetaminophen (TYLENOL) 650 mg CR tablet   Yes No   Sig: if needed   albuterol (ProAir HFA) 90 mcg/act inhaler Past Month  No Yes   Sig: Inhale 2 puffs every 6 (six) hours as needed for wheezing   albuterol (ProAir HFA) 90 mcg/act inhaler   No No   Sig: Inhale 2 puffs every 6 (six) hours as needed for wheezing   atorvastatin (LIPITOR) 20 mg tablet 11/23/2022  No Yes   Sig: TAKE 1 TABLET BY MOUTH EVERY DAY   calcium carbonate (OS-WILLY) 1250 (500 Ca) MG tablet 11/23/2022  Yes Yes   Sig: Take 1,250 mg by mouth   clopidogrel (PLAVIX) 75 mg tablet 11/23/2022  Yes Yes   Sig: Take 75 mg by mouth   gabapentin (NEURONTIN) 300 mg capsule 11/23/2022  No Yes   Sig: Take 2 capsules (600 mg total) by mouth 2 (two) times a day   Patient taking differently: Take 900 mg by mouth 2 (two) times a day   levothyroxine 50 mcg tablet 11/23/2022  No Yes   Sig: TAKE 1 TABLET BY MOUTH EVERY DAY   metoprolol succinate (TOPROL-XL) 25 mg 24 hr tablet 11/23/2022  Yes Yes   Sig: Take 25 mg by mouth daily   midodrine (PROAMATINE) 10 MG tablet 11/23/2022  No Yes   Sig: TAKE 1 TABLET BY MOUTH THREE TIMES A DAY   pantoprazole (PROTONIX) 40 mg tablet 11/23/2022  No Yes   Sig: Take 1 tablet (40 mg total) by mouth daily      Facility-Administered Medications: None       Past Medical History:   Diagnosis Date   • Abdominal adhesions    • Anesthesia complication     difficult to wake up   • Anxiety    • Arthritis    • Cancer (HCC)     melanoma   • Depression    • Depression    • Disease of thyroid gland    • Fibromyalgia    • Fibromyalgia, primary    • Fractures 2006   • GERD (gastroesophageal reflux disease)    • History of cholecystectomy 09/07/2019   • Hyperlipidemia    • Irregular heart beat     can be tachy; also has orthoststic hypotension   • Lazy eye     resolved: 3/27/17   • Medical clearance for psychiatric admission 07/13/2021 • Melanoma ([facility] [address] ) 2010   • Osteoarthritis 07/2018   • Osteopenia     with joint pain-elevated DEV   • Psychiatric disorder    • Shortness of breath     assoc with tachycardia   • Stomach disorder 1995   • Tinnitus    • Wears glasses     for reading       Past Surgical History:   Procedure Laterality Date   • ABDOMINAL SURGERY      lysis of adhesions x 2   • APPENDECTOMY     • CERVICAL FUSION      May 5,2021 and Jan 01,2020   • CHOLECYSTECTOMY      open   • COLONOSCOPY     • DILATION AND CURETTAGE OF UTERUS     • FOOT SURGERY  05/2017   • NECK SURGERY  01/2019 5/2021   • NEUROMA EXCISION Right 05/26/2017    Procedure: EXCISION MASS / FIBROMA FOOT;  Surgeon: Mitra Knott DPM;  Location: [address] [place] [address];  Service:    • PARS PLANA VITRECTOMY W/ REPAIR OF MACULAR HOLE  12/23/2020   • AR XCAPSL CTRC RMVL INSJ IO LENS PROSTH W/O ECP Left 12/06/2021    Procedure: EXTRACTION EXTRACAPSULAR CATARACT PHACO INTRAOCULAR LENS (IOL);   Surgeon: Jose Mcclelland MD;  Location: [facility] MAIN OR;  Service: Ophthalmology   • RIGHT OOPHORECTOMY     • WISDOM TOOTH EXTRACTION      x4       Family History   Problem Relation Age of Onset   • Heart disease Mother    • Stroke Mother 58   • COPD Mother    • Arthritis Mother    • Hypertension Father    • Kidney disease Brother         kidney transplant   • Multiple sclerosis Sister    • No Known Problems Maternal Aunt    • No Known Problems Maternal Uncle    • No Known Problems Paternal Aunt    • No Known Problems Paternal Uncle    • No Known Problems Maternal Grandmother    • No Known Problems Maternal Grandfather    • No Known Problems Paternal Grandmother    • No Known Problems Paternal Grandfather    • Dislocations Sister    • Neurological problems Sister    • Scoliosis Sister    • ADD / ADHD Neg Hx    • Anesthesia problems Neg Hx    • Cancer Neg Hx    • Clotting disorder Neg Hx    • Collagen disease Neg Hx    • Diabetes Neg Hx    • Dislocations Neg Hx    • Learning disabilities Neg Hx    • Neurological problems Neg Hx    • Osteoporosis Neg Hx    • Rheumatologic disease Neg Hx    • Scoliosis Neg Hx    • Vascular Disease Neg Hx      I have reviewed and agree with the history as documented  E-Cigarette/Vaping   • E-Cigarette Use Never User      E-Cigarette/Vaping Substances   • Nicotine No    • THC No    • CBD No    • Flavoring No    • Other No    • Unknown No      Social History     Tobacco Use   • Smoking status: Never   • Smokeless tobacco: Never   Vaping Use   • Vaping Use: Never used   Substance Use Topics   • Alcohol use: Not Currently   • Drug use: No       Review of Systems   Constitutional: Negative for chills, fatigue and fever  HENT: Negative for sore throat  Eyes: Negative for visual disturbance  Respiratory: Negative for cough, chest tightness and shortness of breath  Cardiovascular: Negative for chest pain  Gastrointestinal: Negative for abdominal distention, abdominal pain, constipation, diarrhea, nausea and vomiting  Endocrine: Negative for polydipsia and polyuria  Genitourinary: Negative for dysuria and hematuria  Musculoskeletal: Positive for myalgias  Negative for arthralgias  Skin: Negative for color change and rash  Neurological: Positive for headaches  Negative for dizziness  Psychiatric/Behavioral: Positive for confusion and dysphoric mood  The patient is nervous/anxious  All other systems reviewed and are negative  Physical Exam  Physical Exam  Vitals reviewed  Constitutional:       General: She is not in acute distress  Appearance: She is well-developed and well-nourished  She is not diaphoretic  HENT:      Head: Normocephalic and atraumatic  Right Ear: External ear normal       Left Ear: External ear normal       Nose: Nose normal       Mouth/Throat:      Mouth: Oropharynx is clear and moist       Pharynx: No oropharyngeal exudate  Eyes:      Pupils: Pupils are equal, round, and reactive to light     Cardiovascular:      Rate and Rhythm: Normal rate and regular rhythm  Heart sounds: Normal heart sounds  No murmur heard  No friction rub  No gallop  Pulmonary:      Effort: Pulmonary effort is normal  No respiratory distress  Breath sounds: Normal breath sounds  No wheezing  Chest:      Chest wall: No tenderness  Abdominal:      General: Bowel sounds are normal  There is no distension  Palpations: Abdomen is soft  There is no mass  Tenderness: There is no abdominal tenderness  There is no guarding  Musculoskeletal:         General: No deformity or edema  Normal range of motion  Cervical back: Normal range of motion  Lymphadenopathy:      Cervical: No cervical adenopathy  Skin:     General: Skin is warm and dry  Capillary Refill: Capillary refill takes less than 2 seconds  Neurological:      Mental Status: She is alert and oriented to person, place, and time     Psychiatric:         Mood and Affect: Mood and affect normal          Vital Signs  ED Triage Vitals   Temperature Pulse Respirations Blood Pressure SpO2   11/24/22 0805 11/24/22 0733 11/24/22 0733 11/24/22 0733 11/24/22 0733   (!) 95 8 °F (35 4 °C) 68 16 154/86 100 %      Temp Source Heart Rate Source Patient Position - Orthostatic VS BP Location FiO2 (%)   11/24/22 0805 11/24/22 0830 11/24/22 0830 11/24/22 0830 --   Rectal Monitor Lying Left arm       Pain Score       11/24/22 0733       4           Vitals:    11/24/22 1100 11/24/22 1147 11/24/22 1254 11/24/22 1351   BP: 140/85 137/86 126/77 123/72   Pulse: 61 63 62 63   Patient Position - Orthostatic VS: Lying            Visual Acuity  Visual Acuity    Flowsheet Row Most Recent Value   L Pupil Size (mm) 3   R Pupil Size (mm) 3          ED Medications  Medications   albuterol (PROVENTIL HFA,VENTOLIN HFA) inhaler 2 puff (has no administration in time range)   atorvastatin (LIPITOR) tablet 20 mg (has no administration in time range)   calcium carbonate (OYSTER SHELL,OSCAL) 500 mg tablet 1 tablet (has no administration in time range)   clopidogrel (PLAVIX) tablet 75 mg (75 mg Oral Given 11/24/22 1318)   aspirin (ECOTRIN LOW STRENGTH) EC tablet 81 mg (81 mg Oral Given 11/24/22 1319)   Diclofenac Sodium (VOLTAREN) 1 % topical gel 2 g (2 g Topical Refused 11/24/22 1319)   DULoxetine (CYMBALTA) delayed release capsule 30 mg (30 mg Oral Given 11/24/22 1318)   levothyroxine tablet 50 mcg (has no administration in time range)   magnesium oxide (MAG-OX) tablet 400 mg (400 mg Oral Given 11/24/22 1318)   metoprolol succinate (TOPROL-XL) 24 hr tablet 25 mg (25 mg Oral Given 11/24/22 1318)   midodrine (PROAMATINE) tablet 10 mg (has no administration in time range)   pantoprazole (PROTONIX) EC tablet 40 mg (has no administration in time range)   acetaminophen (TYLENOL) tablet 650 mg (has no administration in time range)   ondansetron (ZOFRAN) injection 4 mg (has no administration in time range)   enoxaparin (LOVENOX) subcutaneous injection 40 mg (40 mg Subcutaneous Given 11/24/22 1318)   pneumococcal 23-valent polysaccharide vaccine (PNEUMOVAX-23) injection 0 5 mL (has no administration in time range)   iohexol (OMNIPAQUE) 350 MG/ML injection (MULTI-DOSE) 85 mL (85 mL Intravenous Given 11/24/22 0811)   ketorolac (TORADOL) injection 15 mg (15 mg Intravenous Given 11/24/22 1054)   influenza vaccine, high-dose (FLUZONE HIGH-DOSE) IM injection CHANDLER 0 7 mL (0 7 mL Intramuscular Given 11/24/22 1318)       Diagnostic Studies  Results Reviewed     Procedure Component Value Units Date/Time    COVID/FLU/RSV [[id number]]  (Normal) Collected: 11/24/22 1009    Lab Status: Final result Specimen: Nares from Nose Updated: 11/24/22 1056     SARS-CoV-2 Negative     INFLUENZA A PCR Negative     INFLUENZA B PCR Negative     RSV PCR Negative    Narrative:      FOR PEDIATRIC PATIENTS - copy/paste COVID Guidelines URL to browser: [email or web address]    SARS-CoV-2 assay is a Nucleic Acid Amplification assay intended for the  qualitative detection of nucleic acid from SARS-CoV-2 in nasopharyngeal  swabs  Results are for the presumptive identification of SARS-CoV-2 RNA  Positive results are indicative of infection with SARS-CoV-2, the virus  causing COVID-19, but do not rule out bacterial infection or co-infection  with other viruses  Laboratories within the [place] and its  territories are required to report all positive results to the appropriate  public health authorities  Negative results do not preclude SARS-CoV-2  infection and should not be used as the sole basis for treatment or other  patient management decisions  Negative results must be combined with  clinical observations, patient history, and epidemiological information  This test has not been FDA cleared or approved  This test has been authorized by FDA under an Emergency Use Authorization  (EUA)  This test is only authorized for the duration of time the  declaration that circumstances exist justifying the authorization of the  emergency use of an in vitro diagnostic tests for detection of SARS-CoV-2  virus and/or diagnosis of COVID-19 infection under section 564(b)(1) of  the Act, 21 U  S C  800LAL-0(V)(2), unless the authorization is terminated  or revoked sooner  The test has been validated but independent review by FDA  and CLIA is pending  Test performed using [facility] [facility] Gene[employer]pert: This RT-PCR assay targets N2,  a region unique to SARS-CoV-2  A conserved region in the E-gene was chosen  for pan-Sarbecovirus detection which includes SARS-CoV-2  According to CMS-2020-01-R, this platform meets the definition of high-throughput technology      TSH, 3rd generation with Free T4 reflex [[id number]]  (Normal) Collected: 11/24/22 0812    Lab Status: Final result Specimen: Blood from Arm, Right Updated: 11/24/22 1005     TSH 3RD GENERATON 1 983 uIU/mL     Narrative:      Patients undergoing fluorescein dye angiography may retain small amounts of fluorescein in the body for 48-72 hours post procedure  Samples containing fluorescein can produce falsely depressed TSH values  If the patient had this procedure,a specimen should be resubmitted post fluorescein clearance  Rapid drug screen, urine [[id number]]     Lab Status: No result Specimen: Urine     Acetaminophen level-If concentration is detectable, please discuss with medical  on call   [[id number]]  (Abnormal) Collected: 11/24/22 0812    Lab Status: Final result Specimen: Blood from Arm, Right Updated: 11/24/22 0856     Acetaminophen Level <2 0 ug/mL     Comprehensive metabolic panel [[id number]]  (Abnormal) Collected: 11/24/22 0812    Lab Status: Final result Specimen: Blood from Arm, Right Updated: 11/24/22 0850     Sodium 145 mmol/L      Potassium 4 3 mmol/L      Chloride 109 mmol/L      CO2 29 mmol/L      ANION GAP 7 mmol/L      BUN 20 mg/dL      Creatinine 0 89 mg/dL      Glucose 144 mg/dL      Calcium 8 8 mg/dL      Corrected Calcium 9 3 mg/dL      AST 21 U/L      ALT 47 U/L      Alkaline Phosphatase 72 U/L      Total Protein 6 4 g/dL      Albumin 3 4 g/dL      Total Bilirubin 0 32 mg/dL      eGFR 67 ml/min/1 73sq m     Narrative:      Reji guidelines for Chronic Kidney Disease (CKD):   •  Stage 1 with normal or high GFR (GFR > 90 mL/min/1 73 square meters)  •  Stage 2 Mild CKD (GFR = 60-89 mL/min/1 73 square meters)  •  Stage 3A Moderate CKD (GFR = 45-59 mL/min/1 73 square meters)  •  Stage 3B Moderate CKD (GFR = 30-44 mL/min/1 73 square meters)  •  Stage 4 Severe CKD (GFR = 15-29 mL/min/1 73 square meters)  •  Stage 5 End Stage CKD (GFR <15 mL/min/1 73 square meters)  Note: GFR calculation is accurate only with a steady state creatinine    Salicylate level [[id number]]  (Abnormal) Collected: 11/24/22 0812    Lab Status: Final result Specimen: Blood from Arm, Right Updated: 26/72/86 2994     Salicylate Lvl <3 mg/dL     Ethanol [[id number]]  (Normal) Collected: 11/24/22 0812    Lab Status: Final result Specimen: Blood from Arm, Right Updated: 11/24/22 0832     Ethanol Lvl <3 mg/dL     CBC and differential [[id number]]  (Abnormal) Collected: 11/24/22 0812    Lab Status: Final result Specimen: Blood from Arm, Right Updated: 11/24/22 0817     WBC 5 50 Thousand/uL      RBC 4 65 Million/uL      Hemoglobin 12 7 g/dL      Hematocrit 40 5 %      MCV 87 fL      MCH 27 3 pg      MCHC 31 4 g/dL      RDW 15 3 %      MPV 10 1 fL      Platelets 342 Thousands/uL      nRBC 0 /100 WBCs      Neutrophils Relative 65 %      Immat GRANS % 0 %      Lymphocytes Relative 24 %      Monocytes Relative 9 %      Eosinophils Relative 2 %      Basophils Relative 0 %      Neutrophils Absolute 3 52 Thousands/µL      Immature Grans Absolute 0 02 Thousand/uL      Lymphocytes Absolute 1 34 Thousands/µL      Monocytes Absolute 0 48 Thousand/µL      Eosinophils Absolute 0 12 Thousand/µL      Basophils Absolute 0 02 Thousands/µL     Fingerstick Glucose (POCT) [[id number]]  (Normal) Collected: 11/24/22 0738    Lab Status: Final result Updated: 11/24/22 0740     POC Glucose 135 mg/dl                  XR chest 1 view portable   Final Result by Roxana Chavarria MD (11/24 8030)      No acute cardiopulmonary disease  Workstation performed: [id number]         CTA head and neck w wo contrast   Final Result by Orly 6, DO (11/24 0900)      No acute intracranial abnormality  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 which is present dating back to at least July 29, 2021  Vessel reconstitutes as a small caliber right vertebral artery is patent in the neck to the vertebrobasilar junction  No significant carotid stenosis  No focal intracranial stenosis or aneurysm                    Workstation performed: [id number]                    Procedures  Procedures         ED Course                               SBIRT 22yo+ Flowsheet Row Most Recent Value   SBIRT (25 yo +)    In order to provide better care to our patients, we are screening all of our patients for alcohol and drug use  Would it be okay to ask you these screening questions? No Filed at: 11/24/2022 6229                    MDM  Number of Diagnoses or Management Options  Altered mental status  Diagnosis management comments: Patient with a mild headache, altered mental status  Patient drowsy and somewhat delayed but responsive and oriented  No nuchal findings  Nonfocal neuro exam   Given patient's fall, headache, use of Plavix, CT head performed without acute findings  No evidence of intracranial hemorrhage  Patient with an unremarkable coma panel, no electrolyte or renal derangement on lab evaluation  Remaining drowsy but arousable  Given patient's lack of return to baseline, admitted to medicine service for further evaluation and treatment      Disposition  Final diagnoses: Altered mental status     Time reflects when diagnosis was documented in both MDM as applicable and the Disposition within this note     Time User Action Codes Description Comment    11/24/2022 10:03 AM Odette Hanson Add [R41 [id number]] Altered mental status       ED Disposition     ED Disposition   Admit    Condition   Stable    Date/Time   Thu Nov 24, 2022 10:03 AM    Comment   Case was discussed with PAVEL and the patient's admission status was agreed to be Admission Status: observation status to the service of Dr Dia Horton   Follow-up Information    None         Current Discharge Medication List      CONTINUE these medications which have NOT CHANGED    Details   !! albuterol (ProAir HFA) 90 mcg/act inhaler Inhale 2 puffs every 6 (six) hours as needed for wheezing  Qty: 8 5 g, Refills: 0    Comments: Substitution to a formulary equivalent within the same pharmaceutical class is authorized    Associated Diagnoses: Acute bronchitis      atorvastatin (LIPITOR) 20 mg tablet TAKE 1 TABLET BY MOUTH EVERY DAY  Qty: 90 tablet, Refills: 2    Associated Diagnoses: Mixed dyslipidemia      calcium carbonate (OS-WILLY) 1250 (500 Ca) MG tablet Take 1,250 mg by mouth      clopidogrel (PLAVIX) 75 mg tablet Take 75 mg by mouth      [facility] Aspirin Adult Low Strength 81 MG EC tablet CHEW AND SWALLOW 1 TABLET BY MOUTH ONCE DAILY      !! DULoxetine (CYMBALTA) 30 mg delayed release capsule Take 1 capsule (30 mg total) by mouth daily  Qty: 90 capsule, Refills: 1    Associated Diagnoses: Major depressive disorder, recurrent, in partial remission ([address] )      ! ! DULoxetine (CYMBALTA) 60 mg delayed release capsule Take 1 capsule (60 mg total) by mouth daily  Qty: 90 capsule, Refills: 1    Associated Diagnoses: Major depressive disorder, recurrent, in partial remission (HCC)      gabapentin (NEURONTIN) 300 mg capsule Take 2 capsules (600 mg total) by mouth 2 (two) times a day  Qty: 120 capsule, Refills: 6    Associated Diagnoses: Fibromyalgia; Peripheral neuropathy      levothyroxine 50 mcg tablet TAKE 1 TABLET BY MOUTH EVERY DAY  Qty: 90 tablet, Refills: 0    Associated Diagnoses: Other specified hypothyroidism      Magnesium 400 MG TABS Take by mouth daily      metoprolol succinate (TOPROL-XL) 25 mg 24 hr tablet Take 25 mg by mouth daily      midodrine (PROAMATINE) 10 MG tablet TAKE 1 TABLET BY MOUTH THREE TIMES A DAY  Qty: 270 tablet, Refills: 0    Associated Diagnoses: Orthostatic hypotension      pantoprazole (PROTONIX) 40 mg tablet Take 1 tablet (40 mg total) by mouth daily  Qty: 30 tablet, Refills: 0    Associated Diagnoses: Gastroesophageal reflux disease, unspecified whether esophagitis present      QUEtiapine (SEROquel) 100 mg tablet Take 1 tablet (100 mg total) by mouth daily at bedtime  Qty: 90 tablet, Refills: 1    Comments: Fill when due  Thank you    Associated Diagnoses: Major depressive disorder, recurrent, in partial remission (HCC)      acetaminophen (TYLENOL) 650 mg CR tablet if needed      !! albuterol (ProAir HFA) 90 mcg/act inhaler Inhale 2 puffs every 6 (six) hours as needed for wheezing  Qty: 18 g, Refills: 2    Comments: Substitution to a formulary equivalent within the same pharmaceutical class is authorized  Associated Diagnoses: Acute bronchitis, unspecified organism       !! - Potential duplicate medications found  Please discuss with provider  STOP taking these medications       Diclofenac Sodium (VOLTAREN) 1 % Comments:   Reason for Stopping:               No discharge procedures on file      PDMP Review       Value Time User    PDMP Reviewed  Yes 11/24/2022 10:33 AM SHRAVAN Sullivan          ED Provider  Electronically Signed by           Nathaniel Persaud MD  11/24/22 9755

## 2022-11-24 NOTE — ASSESSMENT & PLAN NOTE
Patient presented to hospital secondary to altered mental status changes noticed by  at home  Patient poor historian, history is obtained by discussing with  on phone  He indicated that he went to bed around 10:00 p m , and at the time his wife was at baseline  He got up during the night and found her sitting in a chair instead of sleeping in their bed  He reported that he went to the bathroom and when he came back she was lying on the floor, crying  He asked her what had happened and she kept on telling him over and over again," I do not know"  He was suspicious initially that she fell and hit her head, tripping over 1 of their large dogs  This prompted him to bring the patient to the emergency department  In the ED CTA of head and neck was performed which was negative  Chest x-ray was also negative  Urine drug screen pending  When asked directly patient denied illicit drug use  She was uncertain if perhaps she had taken extra of her medications, suspect possible polypharmacy? Unintentional overdose? Patient denied suicidal ideation  As per discussion with ER nurse patient is more awake and verbal than when she 1st presented  Will continue neuro checks q 4 hours  Patient extremely tearful during exam, kept repeating, “I do not know what happened”  Case discussed with  via phone, he indicated in the 15 years that they have been , 8 of those 15 years the patient has wound up in the hospital on Thanksgiving, or other holidays during the year with similar symptoms  Reports that patient had been following with outpatient psychiatry, reported that a month ago she told him that she no longer needed to be followed by Psychiatry    May consider consultation with Psychiatry

## 2022-11-25 DIAGNOSIS — F32.A DEPRESSION: Primary | ICD-10-CM

## 2022-11-25 PROBLEM — T68.XXXA HYPOTHERMIA: Status: RESOLVED | Noted: 2022-11-24 | Resolved: 2022-11-25

## 2022-11-25 PROBLEM — G93.41 ACUTE METABOLIC ENCEPHALOPATHY: Status: ACTIVE | Noted: 2022-11-24

## 2022-11-25 LAB
25(OH)D3 SERPL-MCNC: 38.1 NG/ML (ref 30–100)
ANION GAP SERPL CALCULATED.3IONS-SCNC: 4 MMOL/L (ref 4–13)
ATRIAL RATE: 64 BPM
BUN SERPL-MCNC: 17 MG/DL (ref 5–25)
CALCIUM SERPL-MCNC: 8.7 MG/DL (ref 8.3–10.1)
CHLORIDE SERPL-SCNC: 107 MMOL/L (ref 96–108)
CHOLEST SERPL-MCNC: 147 MG/DL
CO2 SERPL-SCNC: 28 MMOL/L (ref 21–32)
CREAT SERPL-MCNC: 0.75 MG/DL (ref 0.6–1.3)
ERYTHROCYTE [DISTWIDTH] IN BLOOD BY AUTOMATED COUNT: 15.4 % (ref 11.6–15.1)
GFR SERPL CREATININE-BSD FRML MDRD: 83 ML/MIN/1.73SQ M
GLUCOSE P FAST SERPL-MCNC: 109 MG/DL (ref 65–99)
GLUCOSE SERPL-MCNC: 109 MG/DL (ref 65–140)
HCT VFR BLD AUTO: 41.8 % (ref 34.8–46.1)
HDLC SERPL-MCNC: 61 MG/DL
HGB BLD-MCNC: 13.2 G/DL (ref 11.5–15.4)
LDLC SERPL CALC-MCNC: 63 MG/DL (ref 0–100)
MAGNESIUM SERPL-MCNC: 2.2 MG/DL (ref 1.6–2.6)
MCH RBC QN AUTO: 27.3 PG (ref 26.8–34.3)
MCHC RBC AUTO-ENTMCNC: 31.6 G/DL (ref 31.4–37.4)
MCV RBC AUTO: 86 FL (ref 82–98)
PHOSPHATE SERPL-MCNC: 3.5 MG/DL (ref 2.3–4.1)
PLATELET # BLD AUTO: 225 THOUSANDS/UL (ref 149–390)
PMV BLD AUTO: 10.1 FL (ref 8.9–12.7)
POTASSIUM SERPL-SCNC: 4.4 MMOL/L (ref 3.5–5.3)
PR INTERVAL: 144 MS
QRS AXIS: -14 DEGREES
QRSD INTERVAL: 82 MS
QT INTERVAL: 428 MS
QTC INTERVAL: 441 MS
RBC # BLD AUTO: 4.84 MILLION/UL (ref 3.81–5.12)
SODIUM SERPL-SCNC: 139 MMOL/L (ref 135–147)
T WAVE AXIS: 17 DEGREES
TRIGL SERPL-MCNC: 116 MG/DL
TSH SERPL DL<=0.05 MIU/L-ACNC: 3.63 UIU/ML (ref 0.45–4.5)
VENTRICULAR RATE: 64 BPM
VIT B12 SERPL-MCNC: 659 PG/ML (ref 100–900)
WBC # BLD AUTO: 7.25 THOUSAND/UL (ref 4.31–10.16)

## 2022-11-25 RX ORDER — OLANZAPINE 10 MG/1
5 INJECTION, POWDER, LYOPHILIZED, FOR SOLUTION INTRAMUSCULAR
Status: CANCELLED | OUTPATIENT
Start: 2022-11-25

## 2022-11-25 RX ORDER — POLYETHYLENE GLYCOL 3350 17 G/17G
17 POWDER, FOR SOLUTION ORAL DAILY PRN
Status: CANCELLED | OUTPATIENT
Start: 2022-11-25

## 2022-11-25 RX ORDER — HYDROXYZINE 50 MG/1
50 TABLET, FILM COATED ORAL
Status: CANCELLED | OUTPATIENT
Start: 2022-11-25

## 2022-11-25 RX ORDER — TRAZODONE HYDROCHLORIDE 50 MG/1
50 TABLET ORAL
Status: CANCELLED | OUTPATIENT
Start: 2022-11-25

## 2022-11-25 RX ORDER — PROPRANOLOL HYDROCHLORIDE 10 MG/1
5 TABLET ORAL EVERY 8 HOURS PRN
Status: CANCELLED | OUTPATIENT
Start: 2022-11-25

## 2022-11-25 RX ORDER — SODIUM CHLORIDE 9 MG/ML
100 INJECTION, SOLUTION INTRAVENOUS CONTINUOUS
Status: DISPENSED | OUTPATIENT
Start: 2022-11-25 | End: 2022-11-25

## 2022-11-25 RX ORDER — OLANZAPINE 5 MG/1
5 TABLET ORAL
Status: CANCELLED | OUTPATIENT
Start: 2022-11-25

## 2022-11-25 RX ORDER — BENZTROPINE MESYLATE 0.5 MG/1
0.5 TABLET ORAL
Status: CANCELLED | OUTPATIENT
Start: 2022-11-25

## 2022-11-25 RX ORDER — ACETAMINOPHEN 325 MG/1
650 TABLET ORAL EVERY 6 HOURS PRN
Status: CANCELLED | OUTPATIENT
Start: 2022-11-25

## 2022-11-25 RX ORDER — MAGNESIUM HYDROXIDE/ALUMINUM HYDROXICE/SIMETHICONE 120; 1200; 1200 MG/30ML; MG/30ML; MG/30ML
30 SUSPENSION ORAL EVERY 4 HOURS PRN
Status: CANCELLED | OUTPATIENT
Start: 2022-11-25

## 2022-11-25 RX ORDER — LANOLIN ALCOHOL/MO/W.PET/CERES
3 CREAM (GRAM) TOPICAL
Status: CANCELLED | OUTPATIENT
Start: 2022-11-25

## 2022-11-25 RX ORDER — LORAZEPAM 2 MG/ML
1 INJECTION INTRAMUSCULAR
Status: CANCELLED | OUTPATIENT
Start: 2022-11-25

## 2022-11-25 RX ORDER — IBUPROFEN 600 MG/1
600 TABLET ORAL EVERY 6 HOURS PRN
Status: CANCELLED | OUTPATIENT
Start: 2022-11-25

## 2022-11-25 RX ORDER — MIRTAZAPINE 15 MG/1
7.5 TABLET, FILM COATED ORAL
Status: CANCELLED | OUTPATIENT
Start: 2022-11-25

## 2022-11-25 RX ORDER — BISACODYL 10 MG
10 SUPPOSITORY, RECTAL RECTAL DAILY PRN
Status: CANCELLED | OUTPATIENT
Start: 2022-11-25

## 2022-11-25 RX ORDER — HYDROXYZINE HYDROCHLORIDE 25 MG/1
25 TABLET, FILM COATED ORAL
Status: CANCELLED | OUTPATIENT
Start: 2022-11-25

## 2022-11-25 RX ORDER — OLANZAPINE 2.5 MG/1
2.5 TABLET ORAL
Status: CANCELLED | OUTPATIENT
Start: 2022-11-25

## 2022-11-25 RX ORDER — BENZTROPINE MESYLATE 1 MG/ML
1 INJECTION INTRAMUSCULAR; INTRAVENOUS
Status: CANCELLED | OUTPATIENT
Start: 2022-11-25

## 2022-11-25 RX ORDER — LIDOCAINE 50 MG/G
1 PATCH TOPICAL DAILY
Status: DISCONTINUED | OUTPATIENT
Start: 2022-11-25 | End: 2022-11-26 | Stop reason: HOSPADM

## 2022-11-25 RX ORDER — AMOXICILLIN 250 MG
1 CAPSULE ORAL DAILY PRN
Status: CANCELLED | OUTPATIENT
Start: 2022-11-25

## 2022-11-25 RX ORDER — IBUPROFEN 400 MG/1
400 TABLET ORAL EVERY 6 HOURS PRN
Status: CANCELLED | OUTPATIENT
Start: 2022-11-25

## 2022-11-25 RX ADMIN — MIDODRINE HYDROCHLORIDE 10 MG: 5 TABLET ORAL at 21:19

## 2022-11-25 RX ADMIN — CALCIUM 1 TABLET: 500 TABLET ORAL at 08:10

## 2022-11-25 RX ADMIN — CLOPIDOGREL BISULFATE 75 MG: 75 TABLET ORAL at 08:10

## 2022-11-25 RX ADMIN — ACETAMINOPHEN 650 MG: 325 TABLET, FILM COATED ORAL at 01:52

## 2022-11-25 RX ADMIN — MIDODRINE HYDROCHLORIDE 10 MG: 5 TABLET ORAL at 16:53

## 2022-11-25 RX ADMIN — MIDODRINE HYDROCHLORIDE 10 MG: 5 TABLET ORAL at 08:10

## 2022-11-25 RX ADMIN — PANTOPRAZOLE SODIUM 40 MG: 40 TABLET, DELAYED RELEASE ORAL at 05:48

## 2022-11-25 RX ADMIN — DICLOFENAC SODIUM TOPICAL GEL, 1%, 2 G: 10 GEL TOPICAL at 17:53

## 2022-11-25 RX ADMIN — LEVOTHYROXINE SODIUM 50 MCG: 50 TABLET ORAL at 05:48

## 2022-11-25 RX ADMIN — SODIUM CHLORIDE 100 ML/HR: 0.9 INJECTION, SOLUTION INTRAVENOUS at 12:21

## 2022-11-25 RX ADMIN — METOPROLOL SUCCINATE 25 MG: 25 TABLET, EXTENDED RELEASE ORAL at 08:10

## 2022-11-25 RX ADMIN — DICLOFENAC SODIUM TOPICAL GEL, 1%, 2 G: 10 GEL TOPICAL at 12:22

## 2022-11-25 RX ADMIN — LIDOCAINE 5% 1 PATCH: 700 PATCH TOPICAL at 12:21

## 2022-11-25 RX ADMIN — DICLOFENAC SODIUM TOPICAL GEL, 1%, 2 G: 10 GEL TOPICAL at 08:10

## 2022-11-25 RX ADMIN — ASPIRIN 81 MG: 81 TABLET ORAL at 08:10

## 2022-11-25 RX ADMIN — DULOXETINE HYDROCHLORIDE 30 MG: 30 CAPSULE, DELAYED RELEASE ORAL at 08:10

## 2022-11-25 RX ADMIN — ENOXAPARIN SODIUM 40 MG: 40 INJECTION SUBCUTANEOUS at 08:10

## 2022-11-25 RX ADMIN — MAGNESIUM OXIDE TAB 400 MG (241.3 MG ELEMENTAL MG) 400 MG: 400 (241.3 MG) TAB at 08:10

## 2022-11-25 NOTE — PLAN OF CARE
Problem: Potential for Falls  Goal: Patient will remain free of falls  Description: INTERVENTIONS:  - Educate patient/family on patient safety including physical limitations  - Instruct patient to call for assistance with activity   - Consult OT/PT to assist with strengthening/mobility   - Keep Call bell within reach  - Keep bed low and locked with side rails adjusted as appropriate  - Keep care items and personal belongings within reach  - Initiate and maintain comfort rounds  - Make Fall Risk Sign visible to staff  - Offer Toileting every 2 Hours, in advance of need  - Initiate/Maintain bed/chair alarm  - Obtain necessary fall risk management equipment: bed/chair alarm  - Apply yellow socks and bracelet for high fall risk patients  - Consider moving patient to room near nurses station  Outcome: Progressing     Problem: PAIN - ADULT  Goal: Verbalizes/displays adequate comfort level or baseline comfort level  Description: Interventions:  - Encourage patient to monitor pain and request assistance  - Assess pain using appropriate pain scale  - Administer analgesics based on type and severity of pain and evaluate response  - Implement non-pharmacological measures as appropriate and evaluate response  - Consider cultural and social influences on pain and pain management  - Notify physician/advanced practitioner if interventions unsuccessful or patient reports new pain  Outcome: Progressing     Problem: INFECTION - ADULT  Goal: Absence or prevention of progression during hospitalization  Description: INTERVENTIONS:  - Assess and monitor for signs and symptoms of infection  - Monitor lab/diagnostic results  - Monitor all insertion sites, i e  indwelling lines, tubes, and drains  - Monitor endotracheal if appropriate and nasal secretions for changes in amount and color  - Cook appropriate cooling/warming therapies per order  - Administer medications as ordered  - Instruct and encourage patient and family to use good hand hygiene technique  - Identify and instruct in appropriate isolation precautions for identified infection/condition  Outcome: Progressing     Problem: SAFETY ADULT  Goal: Patient will remain free of falls  Description: INTERVENTIONS:  - Educate patient/family on patient safety including physical limitations  - Instruct patient to call for assistance with activity   - Consult OT/PT to assist with strengthening/mobility   - Keep Call bell within reach  - Keep bed low and locked with side rails adjusted as appropriate  - Keep care items and personal belongings within reach  - Initiate and maintain comfort rounds  - Make Fall Risk Sign visible to staff  - Offer Toileting every 2 Hours, in advance of need  - Initiate/Maintain bed/chair alarm  - Obtain necessary fall risk management equipment: bed/chair alarm  - Apply yellow socks and bracelet for high fall risk patients  - Consider moving patient to room near nurses station  Outcome: Progressing  Goal: Maintain or return to baseline ADL function  Description: INTERVENTIONS:  -  Assess patient's ability to carry out ADLs; assess patient's baseline for ADL function and identify physical deficits which impact ability to perform ADLs (bathing, care of mouth/teeth, toileting, grooming, dressing, etc )  - Assess/evaluate cause of self-care deficits   - Assess range of motion  - Assess patient's mobility; develop plan if impaired  - Assess patient's need for assistive devices and provide as appropriate  - Encourage maximum independence but intervene and supervise when necessary  - Involve family in performance of ADLs  - Assess for home care needs following discharge   - Consider OT consult to assist with ADL evaluation and planning for discharge  - Provide patient education as appropriate  Outcome: Progressing  Goal: Maintains/Returns to pre admission functional level  Description: INTERVENTIONS:  - Perform BMAT or MOVE assessment daily    - Set and communicate daily mobility goal to care team and patient/family/caregiver  - Collaborate with rehabilitation services on mobility goals if consulted  - Perform Range of Motion 3 times a day  - Reposition patient every  3 hours  - Dangle patient 3 times a day  - Stand patient 3 times a day  - Ambulate patient 3 times a day  - Out of bed to chair 3 times a day   - Out of bed for meals 3 times a day  - Out of bed for toileting  - Record patient progress and toleration of activity level   Outcome: Progressing     Problem: DISCHARGE PLANNING  Goal: Discharge to home or other facility with appropriate resources  Description: INTERVENTIONS:  - Identify barriers to discharge w/patient and caregiver  - Arrange for needed discharge resources and transportation as appropriate  - Identify discharge learning needs (meds, wound care, etc )  - Arrange for interpretive services to assist at discharge as needed  - Refer to Case Management Department for coordinating discharge planning if the patient needs post-hospital services based on physician/advanced practitioner order or complex needs related to functional status, cognitive ability, or social support system  Outcome: Progressing     Problem: Knowledge Deficit  Goal: Patient/family/caregiver demonstrates understanding of disease process, treatment plan, medications, and discharge instructions  Description: Complete learning assessment and assess knowledge base    Interventions:  - Provide teaching at level of understanding  - Provide teaching via preferred learning methods  Outcome: Progressing     Problem: MOBILITY - ADULT  Goal: Maintain or return to baseline ADL function  Description: INTERVENTIONS:  -  Assess patient's ability to carry out ADLs; assess patient's baseline for ADL function and identify physical deficits which impact ability to perform ADLs (bathing, care of mouth/teeth, toileting, grooming, dressing, etc )  - Assess/evaluate cause of self-care deficits   - Assess range of motion  - Assess patient's mobility; develop plan if impaired  - Assess patient's need for assistive devices and provide as appropriate  - Encourage maximum independence but intervene and supervise when necessary  - Involve family in performance of ADLs  - Assess for home care needs following discharge   - Consider OT consult to assist with ADL evaluation and planning for discharge  - Provide patient education as appropriate  Outcome: Progressing  Goal: Maintains/Returns to pre admission functional level  Description: INTERVENTIONS:  - Perform BMAT or MOVE assessment daily    - Set and communicate daily mobility goal to care team and patient/family/caregiver  - Collaborate with rehabilitation services on mobility goals if consulted  - Perform Range of Motion 3 times a day  - Reposition patient every  3 hours  - Dangle patient 3 times a day  - Stand patient 3 times a day  - Ambulate patient 3 times a day  - Out of bed to chair 3 times a day   - Out of bed for meals 3 times a day  - Out of bed for toileting  - Record patient progress and toleration of activity level   Outcome: Progressing     Problem: NEUROSENSORY - ADULT  Goal: Achieves stable or improved neurological status  Description: INTERVENTIONS  - Monitor and report changes in neurological status  - Monitor vital signs such as temperature, blood pressure, glucose, and any other labs ordered   - Initiate measures to prevent increased intracranial pressure  - Monitor for seizure activity and implement precautions if appropriate      Outcome: Progressing  Goal: Achieves maximal functionality and self care  Description: INTERVENTIONS  - Monitor swallowing and airway patency with patient fatigue and changes in neurological status  - Encourage and assist patient to increase activity and self care     - Encourage visually impaired, hearing impaired and aphasic patients to use assistive/communication devices  Outcome: Progressing

## 2022-11-25 NOTE — ASSESSMENT & PLAN NOTE
Hypothermia present on admission as evidence by temperature of 56 5° requiring application of Qi Hugger  Temperature normalized   OK to discontinue [facility]

## 2022-11-25 NOTE — ASSESSMENT & PLAN NOTE
Hypothermia present on admission as evidence by temperature of 87 6° requiring application of Qi Hugger  Temperature normalized   OK to discontinue [employer]

## 2022-11-25 NOTE — ASSESSMENT & PLAN NOTE
Patient has a history of orthostatic hypotension    She underwent a tilt-table in March 2020  She has a loop recorder     SBP dropped from 113 -> 96 mmHg   · Continue Midodrine 10 mg TID and SIDNEY stockings   · Start gentle IVF as patient appears dry  · Repeat in AM
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
## 2022-11-25 NOTE — ED NOTES
PES notified of medical clearance about 16:15  Called [facility] - voice mail was left  Called [facility] @ 16:30 due to the psych eval not being complete and can not be used in it's current form  16:50 - texted Dr Forest Chun with request to complete psych eval   Psych eval completed by 18:15  Called [facility] - beds are available @ [address] [place] [address] completed and faxed to [facility] @ 18:50 - original placed in envelope in patient's chart  Patient is accepted at [facility] - 6B  Patient is accepted by Dr Forest Chun per [address] is arranged with [facility]   Transportation is scheduled for 09:30  Patient may go to the floor at any time    Nurse report is to be called to [phone] prior to patient transfer  Insurance Authorization for admission:   Phone call placed to Korin Schradermaid number: [phone] - on hold over 10 minutes and then tried [phone]  Spoke to Denisse NUÑEZ    03 days approved  Level of care: inpt  Review on 11/28/22 with Олег Carcmao - [phone]  Authorization # [id number]      This note placed on patient's chart and spoke with her Rn - also told a new covid was needed (21:55)  [facility] was updated by phone

## 2022-11-25 NOTE — ASSESSMENT & PLAN NOTE
As noted above,  reported that patient had been following with outpatient psychiatry but stopped following with them about 1 month ago    · Continue holding Seroquel and Neurontin  · Consult psychiatry   · Consult Cymbalta  · Supportive care

## 2022-11-25 NOTE — ASSESSMENT & PLAN NOTE
Presented to hospital secondary to altered mental status changes noticed by  at home  Patient vasquez historian, history is obtained by discussing with  on phone  He indicated that he went to bed around 10 pm and at the time his wife was at baseline  He got up during the night and found her sitting in a chair instead of sleeping in their bed  He reported that he went to the bathroom and when he came back she was lying on the floor, crying  He asked her what had happened and she kept on telling him over and over again," I do not know"  He was suspicious, initially, that she fell and hit her head, tripping over 1 of their large dogs  This prompted him to bring the patient to the emergency department  · CTA of head and neck: "No acute intracranial abnormality  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 "  · CXR: unremarkable  · TSH/UDS/B12/UA within normal limits  · Vit d panel pending    · Per , in the 15 years that they have been , 10 of those 15 years the patient has wound up in the hospital on holidays throughout the year with similar symptoms  · Reports that patient had been following with outpatient psychiatry, reported that a month ago she told him that she no longer needed to be followed by Psychiatry  · Mentation improved  Now awake and alert     · Will consult psychiatry for medication reconciliation   · Patient is depressed and tearful during examination today

## 2022-11-25 NOTE — ASSESSMENT & PLAN NOTE
Presented to hospital secondary to altered mental status changes noticed by  at home  Patient vasquez historian, history is obtained by discussing with  on phone  He indicated that he went to bed around 10 pm and at the time his wife was at baseline  He got up during the night and found her sitting in a chair instead of sleeping in their bed  He reported that he went to the bathroom and when he came back she was lying on the floor, crying  He asked her what had happened and she kept on telling him over and over again," I do not know"  He was suspicious, initially, that she fell and hit her head, tripping over 1 of their large dogs  This prompted him to bring the patient to the emergency department  · CTA of head and neck: "No acute intracranial abnormality  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 "  · CXR: unremarkable  · TSH/UDS/B12/UA within normal limits  · Vit d panel pending    · Per , in the 15 years that they have been , 10 of those 15 years the patient has wound up in the hospital on holidays throughout the year with similar symptoms  · Reports that patient had been following with outpatient psychiatry, reported that a month ago she told him that she no longer needed to be followed by Psychiatry  · Mentation improved  Now awake and alert     · Psychiatry consult appreciated  · Recommend voluntary IP psych admission   · Patient is been discharged to [facility] in stable condition

## 2022-11-25 NOTE — PROGRESS NOTES
[facility] [address]  Progress Note - Roderick Mack 1955, 78 y o  female MRN: [id number]  Unit/Bed#: [address] [place] [address] Encounter: [id number]  Primary Care Provider: Arash Diaz MD   Date and time admitted to hospital: 11/24/2022  7:27 AM    * Acute metabolic encephalopathy  Assessment & Plan  Presented to hospital secondary to altered mental status changes noticed by  at home  Patient poor historian, history is obtained by discussing with  on phone  He indicated that he went to bed around 10 pm and at the time his wife was at baseline  He got up during the night and found her sitting in a chair instead of sleeping in their bed  He reported that he went to the bathroom and when he came back she was lying on the floor, crying  He asked her what had happened and she kept on telling him over and over again," I do not know"  He was suspicious, initially, that she fell and hit her head, tripping over 1 of their large dogs  This prompted him to bring the patient to the emergency department  · CTA of head and neck: "No acute intracranial abnormality  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 "  · CXR: unremarkable  · TSH/UDS/B12/UA within normal limits  · Vit d panel pending    · Per , in the 15 years that they have been , 10 of those 15 years the patient has wound up in the hospital on holidays throughout the year with similar symptoms  · Reports that patient had been following with outpatient psychiatry, reported that a month ago she told him that she no longer needed to be followed by Psychiatry  · Mentation improved  Now awake and alert     · Psychiatry consult appreciated  · Recommend voluntary IP psych admission   · Crisis aware - will speak with patient and her     · Patient is medically stable for discharge to inpatient psychiatry     Anxiety and depression  Assessment & Plan  As noted above,  reported that patient had been following with outpatient psychiatry but stopped following with them about 1 month ago  · Continue holding Seroquel and Neurontin  · Consulted psychiatry who recommend voluntary IP psych admission   · Consult Cymbalta  · Supportive care    Adult hypothyroidism  Assessment & Plan  TSH within normal limits   · Continue levothyroxine 50 mcg    Orthostatic hypotension  Assessment & Plan  Patient has a history of orthostatic hypotension    She underwent a tilt-table in March 2020  She has a loop recorder  SBP dropped from 113 -> 96 mmHg   · Continue Midodrine 10 mg TID and SIDNEY stockings   · Start gentle IVF as patient appears dry  · Repeat in AM     Hypothermia-resolved as of 11/25/2022  Assessment & Plan  Hypothermia present on admission as evidence by temperature of 00 7° requiring application of Qi Hugger  Temperature normalized  OK to discontinue Qi Hugger        VTE Pharmacologic Prophylaxis: VTE Score: 3 Moderate Risk (Score 3-4) - Pharmacological DVT Prophylaxis Ordered: enoxaparin (Lovenox)  Patient Centered Rounds: I performed bedside rounds with nursing staff today  Discussions with Specialists or Other Care Team Provider: nursing, CM, psych     Education and Discussions with Family / Patient: Updated  () via phone  Time Spent for Care: 30 minutes  More than 50% of total time spent on counseling and coordination of care as described above  Current Length of Stay: 0 day(s)  Current Patient Status: Observation   Certification Statement: The patient will continue to require additional inpatient hospital stay due to psychiatry consult, depression/anxiety/ AMS  Discharge Plan: Anticipate discharge tomorrow to home  Code Status: Level 1 - Full Code    Subjective:   Patient seen and examined at bedside  Reports bilateral calf cramps  Reports sore, stiff neck  Willing to trial hydration and lidoderm patch  Would like to speak with psychiatry   Denies HA, dizziness, CP or SOB  Appetite poor  Objective:     Vitals:   Temp (24hrs), Av 8 °F (36 6 °C), Min:97 3 °F (36 3 °C), Max:98 °F (36 7 °C)    Temp:  [97 3 °F (36 3 °C)-98 °F (36 7 °C)] 98 °F (36 7 °C)  HR:  [53-69] 56  Resp:  [16-17] 17  BP: ()/(57-88) 155/88  SpO2:  [92 %-100 %] 94 %  Body mass index is 27 26 kg/m²  Input and Output Summary (last 24 hours): Intake/Output Summary (Last 24 hours) at 2022 1545  Last data filed at 2022 1601  Gross per 24 hour   Intake --   Output 900 ml   Net -900 ml       Physical Exam:   Physical Exam  Vitals and nursing note reviewed  Constitutional:       General: She is not in acute distress  Appearance: She is not toxic-appearing or diaphoretic  HENT:      Head: Normocephalic  Mouth/Throat:      Mouth: Mucous membranes are moist    Eyes:      Conjunctiva/sclera: Conjunctivae normal    Cardiovascular:      Rate and Rhythm: Normal rate  Pulmonary:      Effort: Pulmonary effort is normal       Breath sounds: Normal breath sounds  No wheezing, rhonchi or rales  Abdominal:      General: Bowel sounds are normal       Palpations: Abdomen is soft  Musculoskeletal:         General: Normal range of motion  Cervical back: Normal range of motion  Right lower leg: No edema  Left lower leg: No edema  Skin:     General: Skin is warm and dry  Capillary Refill: Capillary refill takes less than 2 seconds  Neurological:      Mental Status: She is alert and oriented to person, place, and time  Mental status is at baseline  Psychiatric:         Mood and Affect: Mood is depressed  Affect is tearful  Speech: Speech normal          Behavior: Behavior normal  Behavior is cooperative  Thought Content: Thought content normal          Cognition and Memory: She exhibits impaired recent memory           Judgment: Judgment normal           Additional Data:     Labs:  Results from last 7 days   Lab Units 22  1875 11/24/22  0812   WBC Thousand/uL 7 25 5 50   HEMOGLOBIN g/dL 13 2 12 7   HEMATOCRIT % 41 8 40 5   PLATELETS Thousands/uL 225 225   NEUTROS PCT %  --  65   LYMPHS PCT %  --  24   MONOS PCT %  --  9   EOS PCT %  --  2     Results from last 7 days   Lab Units 11/25/22  0547 11/24/22  0812   SODIUM mmol/L 139 145   POTASSIUM mmol/L 4 4 4 3   CHLORIDE mmol/L 107 109*   CO2 mmol/L 28 29   BUN mg/dL 17 20   CREATININE mg/dL 0 75 0 89   ANION GAP mmol/L 4 7   CALCIUM mg/dL 8 7 8 8   ALBUMIN g/dL  --  3 4*   TOTAL BILIRUBIN mg/dL  --  0 32   ALK PHOS U/L  --  72   ALT U/L  --  47   AST U/L  --  21   GLUCOSE RANDOM mg/dL 109 144*         Results from last 7 days   Lab Units 11/24/22  0738   POC GLUCOSE mg/dl 135               Lines/Drains:  Invasive Devices     Peripheral Intravenous Line  Duration           Peripheral IV 11/24/22 Right Antecubital 1 day                      Imaging: Reviewed radiology reports from this admission including: chest xray and CTA H/N    Recent Cultures (last 7 days):         Last 24 Hours Medication List:   Current Facility-Administered Medications   Medication Dose Route Frequency Provider Last Rate   • acetaminophen  650 mg Oral Q6H PRN Mar Nails, CRNP     • albuterol  2 puff Inhalation Q6H PRN Mar Nails, CRNP     • aspirin  81 mg Oral Daily Mar Nails, CRNP     • calcium carbonate  1 tablet Oral Daily With Breakfast Mar Nails, CRNP     • clopidogrel  75 mg Oral Daily Mar Nails, CRNP     • Diclofenac Sodium  2 g Topical 4x Daily Mar Nails, CRNP     • DULoxetine  30 mg Oral Daily Mar Nails, CRNP     • enoxaparin  40 mg Subcutaneous Daily Mar Nails, CRNP     • levothyroxine  50 mcg Oral Daily Mar Nails, CRNP     • lidocaine  1 patch Topical Daily Sterling Wellington, CRNP     • magnesium oxide  400 mg Oral Daily Mar Nails, CRNP     • metoprolol succinate 25 mg Oral Daily SHRAVAN Rojo     • midodrine  10 mg Oral TID SHRAVAN Rojo     • ondansetron  4 mg Intravenous Q6H PRN SHRAVAN Rojo     • pantoprazole  40 mg Oral Daily SHRAVAN Rojo     • pneumococcal 23-valent polysaccharide vaccine  0 5 mL Subcutaneous Prior to discharge SHRAVAN Rojo     • sodium chloride  100 mL/hr Intravenous Continuous SHRAVAN Junior 100 mL/hr (11/25/22 1221)        Today, Patient Was Seen By: SHRAVAN Junior    **Please Note: This note may have been constructed using a voice recognition system  **

## 2022-11-25 NOTE — ASSESSMENT & PLAN NOTE
Patient has a history of orthostatic hypotension    She underwent a tilt-table in March 2020  She has a loop recorder     SBP dropped from 113 -> 96 mmHg   · Continue Midodrine 10 mg TID and SIDNEY stockings   · Discharge blood pressure in the 150s

## 2022-11-25 NOTE — ASSESSMENT & PLAN NOTE
As noted above,  reported that patient had been following with outpatient psychiatry but stopped following with them about 1 month ago    · Continue holding Seroquel and Neurontin  · Consulted psychiatry who recommend voluntary IP psych admission   · Consult Cymbalta  · Supportive care

## 2022-11-25 NOTE — UTILIZATION REVIEW
Initial Clinical Review    Admission: Date/Time/Statement:11/24/22 1003 observation    Admission Orders (From admission, onward)     Ordered        11/24/22 1003  Place in Observation  Once                      Orders Placed This Encounter   Procedures   • Place in Observation     Standing Status:   Standing     Number of Occurrences:   1     Order Specific Question:   Level of Care     Answer:   Med Surg [16]     ED Arrival Information     Expected   -    Arrival   11/24/2022 07:27    Acuity   Emergent            Means of arrival   Walk-In    Escorted by   Spouse    Service   Hospitalist    Admission type   Emergency            Arrival complaint   altered mental status            Chief Complaint   Patient presents with   • Altered Mental Status      states   that last Known well is  Last night 10 pm   Pt  Was found by hium today crying on the floor  Pt  Is very confused states headache and  states vomitted  on ride over here  Initial Presentation: 78 y o  female from home to ED admitted to observation due to acute metabolic encephalopathy/Hypothermia/OSMAN on CKD  PMH of depression, hypothyroid, orthostatic hypotension, anxiety  Presented due to altered mental status on day of arrival as spouse found sitting on floor, crying, drowsy and less responsive  Last known normal evening prior to arrival  Per spouse has happened x 10 in past with no known etiology  En route episode of vomiting  In ED complaints of general pain, drowsy, tearful, anxious  Hypothermic  Glucose 144  Bun 20, creatinine 0 89 wit baseline of 0 72  Ct head without acute findings  In the ED given Toradol  Plan includes:  Check TDH, UDS, vitamin D, B12  Monitor glucose  Normothermia with karen hugger and warm blanket,  Neuro check every 4 hours  IVF, hold diuretics  11/25/22 Observation:   Has bilateral  Leg  Calf cramps  Sore stiff neck  Wants to speak wit psychiatry  On exam:  Depressed, tearful  Impaired recent memory  Appears dry  Plan is to consult psyche,  Who is recommending voluntary IP admission  Cymbalta in progress  Home Seroquel and Neurontin on hold  Start IVF  Continue Midodrine  Start Lidoderm patch  11/25/22 psyche - recommend inpatient U       ED Triage Vitals   Temperature Pulse Respirations Blood Pressure SpO2   11/24/22 0805 11/24/22 0733 11/24/22 0733 11/24/22 0733 11/24/22 0733   (!) 95 8 °F (35 4 °C) 68 16 154/86 100 %      Temp Source Heart Rate Source Patient Position - Orthostatic VS BP Location FiO2 (%)   11/24/22 0805 11/24/22 0830 11/24/22 0830 11/24/22 0830 --   Rectal Monitor Lying Left arm       Pain Score       11/24/22 0733       4          Wt Readings from Last 1 Encounters:   11/24/22 74 3 kg (163 lb 12 8 oz)     Additional Vital Signs:   11/25/22 0430 -- 57 -- 143/74 97 94 % -- --   11/25/22 0129 -- 53 Abnormal  -- 144/88 107 93 % -- --   11/25/22 0029 -- 54 Abnormal  -- 153/83 106 -- -- --   11/24/22 22:28:43 98 °F (36 7 °C) 55 16 141/76 98 93 % -- --   11/24/22 20:08:12 -- 64 16 137/69 92 96 % -- --   11/24/22 18:57:27 -- -- -- -- -- 96 % -- --   11/24/22 18:49:49 -- 58 -- 96/67 77 100 % -- Standing - Orthostatic VS   11/24/22 18:47:50 -- 66 -- 122/72 89 94 % -- Sitting - Orthostatic VS   11/24/22 18:45:54 -- 67 -- 113/64 80 96 % -- Lying - Orthostatic VS   11/24/22 17:53:09 -- 67 -- 126/76 93 92 % -- --   11/24/22 15:51:51 97 3 °F (36 3 °C) Abnormal  69 17 108/57 74 97 % None (Room air) Sitting   11/24/22 13:51:16 97 4 °F (36 3 °C) Abnormal  63 17 123/72 89 95 % None (Room air) Sitting   11/24/22 12:54:52 97 5 °F (36 4 °C) 62 17 126/77 93 97 % -- --   11/24/22 11:47:06 97 3 °F (36 3 °C) Abnormal  63 17 137/86 103 100 % None (Room air) --   11/24/22 1126 97 1 °F (36 2 °C) Abnormal  -- -- -- -- -- -- --   11/24/22 1100 -- 61 20 140/85 105 100 % None (Room air) Lying   11/24/22 0930 -- 62 16 130/71 96 99 % None (Room air) Lying   11/24/22 0923 96 3 °F (35 7 °C) Abnormal  -- -- -- -- --       Pertinent Labs/Diagnostic Test Results:   XR chest 1 view portable   Final Result by Jono Alejo MD (11/24 0118)      No acute cardiopulmonary disease  Workstation performed: [id number]         CTA head and neck w wo contrast   Final Result by Orly 6, DO (11/24 0900)      No acute intracranial abnormality  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 which is present dating back to at least July 29, 2021  Vessel reconstitutes as a small caliber right vertebral artery is patent in the neck to the vertebrobasilar junction  No significant carotid stenosis  No focal intracranial stenosis or aneurysm                    Workstation performed: [id number]           Results from last 7 days   Lab Units 11/24/22  1009   SARS-COV-2  Negative     Results from last 7 days   Lab Units 11/25/22  0547 11/24/22  0812   WBC Thousand/uL 7 25 5 50   HEMOGLOBIN g/dL 13 2 12 7   HEMATOCRIT % 41 8 40 5   PLATELETS Thousands/uL 225 225   NEUTROS ABS Thousands/µL  --  3 52     Results from last 7 days   Lab Units 11/25/22  0547 11/24/22  0812   SODIUM mmol/L 139 145   POTASSIUM mmol/L 4 4 4 3   CHLORIDE mmol/L 107 109*   CO2 mmol/L 28 29   ANION GAP mmol/L 4 7   BUN mg/dL 17 20   CREATININE mg/dL 0 75 0 89   EGFR ml/min/1 73sq m 83 67   CALCIUM mg/dL 8 7 8 8     Results from last 7 days   Lab Units 11/24/22  0812   AST U/L 21   ALT U/L 47   ALK PHOS U/L 72   TOTAL PROTEIN g/dL 6 4   ALBUMIN g/dL 3 4*   TOTAL BILIRUBIN mg/dL 0 32     Results from last 7 days   Lab Units 11/24/22  0738   POC GLUCOSE mg/dl 135     Results from last 7 days   Lab Units 11/25/22  0547 11/24/22  0812   GLUCOSE RANDOM mg/dL 109 144*     Results from last 7 days   Lab Units 11/24/22  0812   TSH 3RD GENERATON uIU/mL 1 983     Results from last 7 days   Lab Units 11/24/22  1542   CLARITY UA  Clear   COLOR UA  Yellow   SPEC GRAV UA  <=1 005   PH UA  7 5 GLUCOSE UA mg/dl Negative   KETONES UA mg/dl Negative   BLOOD UA  Negative   PROTEIN UA mg/dl Negative   NITRITE UA  Negative   BILIRUBIN UA  Negative   UROBILINOGEN UA E U /dl 0 2   LEUKOCYTES UA  Negative     Results from last 7 days   Lab Units 11/24/22  1009   INFLUENZA A PCR  Negative   INFLUENZA B PCR  Negative   RSV PCR  Negative     Results from last 7 days   Lab Units 11/24/22  1542   AMPH/METH  Negative   BARBITURATE UR  Negative   BENZODIAZEPINE UR  Negative   COCAINE UR  Negative   METHADONE URINE  Negative   OPIATE UR  Negative   PCP UR  Negative   THC UR  Negative     Results from last 7 days   Lab Units 11/24/22  0812   ETHANOL LVL mg/dL <3   ACETAMINOPHEN LVL ug/mL <5 0*   SALICYLATE LVL mg/dL <3*       ED Treatment:   Medication Administration from 11/24/2022 0726 to 11/24/2022 1140       Date/Time Order Dose Route Action Comments     11/24/2022 1054 EST ketorolac (TORADOL) injection 15 mg 15 mg Intravenous Given --        Past Medical History:   Diagnosis Date   • Abdominal adhesions    • Anesthesia complication     difficult to wake up   • Anxiety    • Arthritis    • Cancer ([facility] )     melanoma   • Depression    • Depression    • Disease of thyroid gland    • Fibromyalgia    • Fibromyalgia, primary    • Fractures 2006   • GERD (gastroesophageal reflux disease)    • History of cholecystectomy 09/07/2019   • Hyperlipidemia    • Irregular heart beat     can be tachy; also has orthoststic hypotension   • Lazy eye     resolved: 3/27/17   • Medical clearance for psychiatric admission 07/13/2021   • Melanoma ([facility] ) 2010   • Osteoarthritis 07/2018   • Osteopenia     with joint pain-elevated DEV   • Psychiatric disorder    • Shortness of breath     assoc with tachycardia   • Stomach disorder 1995   • Tinnitus    • Wears glasses     for reading     Present on Admission:  • AMS (altered mental status)  • Adult hypothyroidism  • Depression  • Orthostatic hypotension  • Anxiety  • Hypothermia      Admitting Diagnosis: Altered mental status [R41 82]  Age/Sex: 78 y o  female  Admission Orders:  Scheduled Medications:  aspirin, 81 mg, Oral, Daily  atorvastatin, 20 mg, Oral, Daily With Dinner dc 11/25/22   calcium carbonate, 1 tablet, Oral, Daily With Breakfast  clopidogrel, 75 mg, Oral, Daily  Diclofenac Sodium, 2 g, Topical, 4x Daily  DULoxetine, 30 mg, Oral, Daily  enoxaparin, 40 mg, Subcutaneous, Daily  levothyroxine, 50 mcg, Oral, Daily  magnesium oxide, 400 mg, Oral, Daily  metoprolol succinate, 25 mg, Oral, Daily  midodrine, 10 mg, Oral, TID  pantoprazole, 40 mg, Oral, Daily    lidocaine (LIDODERM) 5 % patch 1 patch  Dose: 1 patch  Freq: Daily Route: TP  Start: 11/25/22 1100    Continuous IV Infusions:  sodium chloride 0 9 % infusion  Rate: 100 mL/hr Dose: 100 mL/hr  Freq: Continuous Route: IV  Indications of Use: IV Hydration  Last Dose: 100 mL/hr (11/25/22 1221)  Start: 11/25/22 1100 End: 11/25/22 1720     PRN Meds:  acetaminophen, 650 mg, Oral, Q6H PRN - used x 1 11/25/22  albuterol, 2 puff, Inhalation, Q6H PRN  ondansetron, 4 mg, Intravenous, Q6H PRN  pneumococcal 23-valent polysaccharide vaccine, 0 5 mL, Subcutaneous, Prior to discharge    Neuro check every 4 hours  Network Utilization Review Department  ATTENTION: Please call with any questions or concerns to [phone] and carefully listen to the prompts so that you are directed to the right person  All voicemails are confidential   Kylah Kate all requests for admission clinical reviews, approved or denied determinations and any other requests to dedicated fax number below belonging to the campus where the patient is receiving treatment   List of dedicated fax numbers for the Facilities:  [address] DENIALS (Administrative/Medical Necessity) [phone]   1000  [facility] (Maternity/NICU/Pediatrics) [phone]   [place] Cristina Osorio [phone]   [facility] [phone]     [address] [phone]   [address] [place] [address] Kristen DonohueA.O. [facility] Hospitaljulio [address] [facility] [place] 134 136 [facility] [phone]

## 2022-11-25 NOTE — PLAN OF CARE
Problem: Potential for Falls  Goal: Patient will remain free of falls  Description: INTERVENTIONS:  - Educate patient/family on patient safety including physical limitations  - Instruct patient to call for assistance with activity   - Consult OT/PT to assist with strengthening/mobility   - Keep Call bell within reach  - Keep bed low and locked with side rails adjusted as appropriate  - Keep care items and personal belongings within reach  - Initiate and maintain comfort rounds  - Make Fall Risk Sign visible to staff  - Offer Toileting every 2 Hours, in advance of need  - Initiate/Maintain bed/chair alarm  - Obtain necessary fall risk management equipment: bed/chair alarm  - Apply yellow socks and bracelet for high fall risk patients  - Consider moving patient to room near nurses station  Outcome: Progressing     Problem: PAIN - ADULT  Goal: Verbalizes/displays adequate comfort level or baseline comfort level  Description: Interventions:  - Encourage patient to monitor pain and request assistance  - Assess pain using appropriate pain scale  - Administer analgesics based on type and severity of pain and evaluate response  - Implement non-pharmacological measures as appropriate and evaluate response  - Consider cultural and social influences on pain and pain management  - Notify physician/advanced practitioner if interventions unsuccessful or patient reports new pain  Outcome: Progressing     Problem: SAFETY ADULT  Goal: Patient will remain free of falls  Description: INTERVENTIONS:  - Educate patient/family on patient safety including physical limitations  - Instruct patient to call for assistance with activity   - Consult OT/PT to assist with strengthening/mobility   - Keep Call bell within reach  - Keep bed low and locked with side rails adjusted as appropriate  - Keep care items and personal belongings within reach  - Initiate and maintain comfort rounds  - Make Fall Risk Sign visible to staff  - Offer Toileting every 2 Hours, in advance of need  - Initiate/Maintain bed/chair alarm  - Obtain necessary fall risk management equipment: bed/chair alarm  - Apply yellow socks and bracelet for high fall risk patients  - Consider moving patient to room near nurses station  Outcome: Progressing  Goal: Maintain or return to baseline ADL function  Description: INTERVENTIONS:  -  Assess patient's ability to carry out ADLs; assess patient's baseline for ADL function and identify physical deficits which impact ability to perform ADLs (bathing, care of mouth/teeth, toileting, grooming, dressing, etc )  - Assess/evaluate cause of self-care deficits   - Assess range of motion  - Assess patient's mobility; develop plan if impaired  - Assess patient's need for assistive devices and provide as appropriate  - Encourage maximum independence but intervene and supervise when necessary  - Involve family in performance of ADLs  - Assess for home care needs following discharge   - Consider OT consult to assist with ADL evaluation and planning for discharge  - Provide patient education as appropriate  Outcome: Progressing  Goal: Maintains/Returns to pre admission functional level  Description: INTERVENTIONS:  - Perform BMAT or MOVE assessment daily    - Set and communicate daily mobility goal to care team and patient/family/caregiver  - Collaborate with rehabilitation services on mobility goals if consulted  - Perform Range of Motion 3 times a day  - Reposition patient every  3 hours    - Dangle patient 3 times a day  - Stand patient 3 times a day  - Ambulate patient 3 times a day  - Out of bed to chair 3 times a day   - Out of bed for meals 3 times a day  - Out of bed for toileting  - Record patient progress and toleration of activity level   Outcome: Progressing     Problem: DISCHARGE PLANNING  Goal: Discharge to home or other facility with appropriate resources  Description: INTERVENTIONS:  - Identify barriers to discharge w/patient and caregiver  - Arrange for needed discharge resources and transportation as appropriate  - Identify discharge learning needs (meds, wound care, etc )  - Arrange for interpretive services to assist at discharge as needed  - Refer to Case Management Department for coordinating discharge planning if the patient needs post-hospital services based on physician/advanced practitioner order or complex needs related to functional status, cognitive ability, or social support system  Outcome: Progressing     Problem: Knowledge Deficit  Goal: Patient/family/caregiver demonstrates understanding of disease process, treatment plan, medications, and discharge instructions  Description: Complete learning assessment and assess knowledge base    Interventions:  - Provide teaching at level of understanding  - Provide teaching via preferred learning methods  Outcome: Progressing

## 2022-11-25 NOTE — ED NOTES
11/25/22 @ [id number]:  PES notified that patient was evaluated by psychiatrist, who recommended inpatient psychiatric treatment  PES will wait on note of medical clearance, and meet with patient to determine if she's voluntary or involuntary    Mane Armenta MS

## 2022-11-26 ENCOUNTER — HOSPITAL ENCOUNTER (INPATIENT)
Facility: HOSPITAL | Age: 67
LOS: 6 days | Discharge: HOME | End: 2022-12-02
Attending: PSYCHIATRY & NEUROLOGY | Admitting: GENERAL PRACTICE

## 2022-11-26 VITALS
HEART RATE: 71 BPM | DIASTOLIC BLOOD PRESSURE: 87 MMHG | HEIGHT: 65 IN | BODY MASS INDEX: 27.29 KG/M2 | WEIGHT: 163.8 LBS | TEMPERATURE: 98.8 F | RESPIRATION RATE: 18 BRPM | SYSTOLIC BLOOD PRESSURE: 150 MMHG | OXYGEN SATURATION: 92 %

## 2022-11-26 DIAGNOSIS — I95.1 ORTHOSTATIC HYPOTENSION: ICD-10-CM

## 2022-11-26 DIAGNOSIS — F32.A DEPRESSION: ICD-10-CM

## 2022-11-26 LAB
ANION GAP SERPL CALCULATED.3IONS-SCNC: 9 MMOL/L (ref 4–13)
BUN SERPL-MCNC: 11 MG/DL (ref 5–25)
CALCIUM SERPL-MCNC: 8.9 MG/DL (ref 8.3–10.1)
CHLORIDE SERPL-SCNC: 109 MMOL/L (ref 96–108)
CO2 SERPL-SCNC: 25 MMOL/L (ref 21–32)
CREAT SERPL-MCNC: 0.81 MG/DL (ref 0.6–1.3)
GFR SERPL CREATININE-BSD FRML MDRD: 75 ML/MIN/1.73SQ M
GLUCOSE SERPL-MCNC: 95 MG/DL (ref 65–140)
MAGNESIUM SERPL-MCNC: 2 MG/DL (ref 1.6–2.6)
POTASSIUM SERPL-SCNC: 4.3 MMOL/L (ref 3.5–5.3)
PROCALCITONIN SERPL-MCNC: 0.06 NG/ML
SARS-COV-2 RNA RESP QL NAA+PROBE: NEGATIVE
SODIUM SERPL-SCNC: 143 MMOL/L (ref 135–147)

## 2022-11-26 RX ORDER — PANTOPRAZOLE SODIUM 40 MG/1
40 TABLET, DELAYED RELEASE ORAL DAILY
Status: DISCONTINUED | OUTPATIENT
Start: 2022-11-27 | End: 2022-12-02 | Stop reason: HOSPADM

## 2022-11-26 RX ORDER — PANTOPRAZOLE SODIUM 40 MG/1
40 TABLET, DELAYED RELEASE ORAL DAILY
Status: DISCONTINUED | OUTPATIENT
Start: 2022-11-26 | End: 2022-11-26 | Stop reason: SDUPTHER

## 2022-11-26 RX ORDER — ALBUTEROL SULFATE 90 UG/1
2 AEROSOL, METERED RESPIRATORY (INHALATION) EVERY 6 HOURS PRN
Status: CANCELLED | OUTPATIENT
Start: 2022-11-26

## 2022-11-26 RX ORDER — DULOXETIN HYDROCHLORIDE 60 MG/1
60 CAPSULE, DELAYED RELEASE ORAL DAILY
Status: DISCONTINUED | OUTPATIENT
Start: 2022-11-26 | End: 2022-11-27

## 2022-11-26 RX ORDER — MIDODRINE HYDROCHLORIDE 5 MG/1
10 TABLET ORAL 3 TIMES DAILY
Status: CANCELLED | OUTPATIENT
Start: 2022-11-26

## 2022-11-26 RX ORDER — ACETAMINOPHEN 325 MG/1
650 TABLET ORAL EVERY 6 HOURS PRN
Status: DISCONTINUED | OUTPATIENT
Start: 2022-11-26 | End: 2022-12-02 | Stop reason: HOSPADM

## 2022-11-26 RX ORDER — DULOXETIN HYDROCHLORIDE 30 MG/1
30 CAPSULE, DELAYED RELEASE ORAL DAILY
Status: CANCELLED | OUTPATIENT
Start: 2022-11-26

## 2022-11-26 RX ORDER — METOPROLOL SUCCINATE 25 MG/1
25 TABLET, EXTENDED RELEASE ORAL DAILY
Status: DISCONTINUED | OUTPATIENT
Start: 2022-11-27 | End: 2022-11-28

## 2022-11-26 RX ORDER — PANTOPRAZOLE SODIUM 40 MG/1
40 TABLET, DELAYED RELEASE ORAL DAILY
Status: CANCELLED | OUTPATIENT
Start: 2022-11-26

## 2022-11-26 RX ORDER — BENZTROPINE MESYLATE 0.5 MG/1
0.5 TABLET ORAL
Status: DISCONTINUED | OUTPATIENT
Start: 2022-11-26 | End: 2022-12-02 | Stop reason: HOSPADM

## 2022-11-26 RX ORDER — MIRTAZAPINE 7.5 MG/1
7.5 TABLET, FILM COATED ORAL
Status: DISCONTINUED | OUTPATIENT
Start: 2022-11-26 | End: 2022-12-02 | Stop reason: HOSPADM

## 2022-11-26 RX ORDER — ATORVASTATIN CALCIUM 20 MG/1
20 TABLET, FILM COATED ORAL DAILY
Status: CANCELLED | OUTPATIENT
Start: 2022-11-26

## 2022-11-26 RX ORDER — SENNOSIDES 8.6 MG
650 CAPSULE ORAL AS NEEDED
Status: CANCELLED | OUTPATIENT
Start: 2022-11-26

## 2022-11-26 RX ORDER — ALBUTEROL SULFATE 90 UG/1
2 AEROSOL, METERED RESPIRATORY (INHALATION) EVERY 6 HOURS PRN
Status: DISCONTINUED | OUTPATIENT
Start: 2022-11-26 | End: 2022-12-02 | Stop reason: HOSPADM

## 2022-11-26 RX ORDER — LIDOCAINE 50 MG/G
1 PATCH TOPICAL DAILY
Status: DISCONTINUED | OUTPATIENT
Start: 2022-11-27 | End: 2022-11-28

## 2022-11-26 RX ORDER — ACETAMINOPHEN 325 MG/1
650 TABLET ORAL EVERY 6 HOURS PRN
Status: CANCELLED | OUTPATIENT
Start: 2022-11-26

## 2022-11-26 RX ORDER — ASPIRIN 81 MG/1
81 TABLET ORAL DAILY
Status: CANCELLED | OUTPATIENT
Start: 2022-11-26

## 2022-11-26 RX ORDER — CALCIUM CARBONATE 500(1250)
1 TABLET ORAL
Status: CANCELLED | OUTPATIENT
Start: 2022-11-27

## 2022-11-26 RX ORDER — AMOXICILLIN 250 MG
1 CAPSULE ORAL DAILY PRN
Status: DISCONTINUED | OUTPATIENT
Start: 2022-11-26 | End: 2022-12-02 | Stop reason: HOSPADM

## 2022-11-26 RX ORDER — ASPIRIN 81 MG/1
81 TABLET ORAL DAILY
Status: DISCONTINUED | OUTPATIENT
Start: 2022-11-26 | End: 2022-12-02 | Stop reason: HOSPADM

## 2022-11-26 RX ORDER — LANOLIN ALCOHOL/MO/W.PET/CERES
3 CREAM (GRAM) TOPICAL
Status: DISCONTINUED | OUTPATIENT
Start: 2022-11-26 | End: 2022-11-29

## 2022-11-26 RX ORDER — ALBUTEROL SULFATE 90 UG/1
2 AEROSOL, METERED RESPIRATORY (INHALATION) EVERY 6 HOURS PRN
Status: DISCONTINUED | OUTPATIENT
Start: 2022-11-26 | End: 2022-11-26 | Stop reason: SDUPTHER

## 2022-11-26 RX ORDER — HYDROXYZINE 50 MG/1
50 TABLET, FILM COATED ORAL
Status: DISCONTINUED | OUTPATIENT
Start: 2022-11-26 | End: 2022-12-02 | Stop reason: HOSPADM

## 2022-11-26 RX ORDER — LIDOCAINE 50 MG/G
1 PATCH TOPICAL DAILY
Status: CANCELLED | OUTPATIENT
Start: 2022-11-27

## 2022-11-26 RX ORDER — LEVOTHYROXINE SODIUM 0.05 MG/1
50 TABLET ORAL DAILY
Status: DISCONTINUED | OUTPATIENT
Start: 2022-11-27 | End: 2022-12-02 | Stop reason: HOSPADM

## 2022-11-26 RX ORDER — GABAPENTIN 300 MG/1
600 CAPSULE ORAL 2 TIMES DAILY
Status: CANCELLED | OUTPATIENT
Start: 2022-11-26

## 2022-11-26 RX ORDER — POLYETHYLENE GLYCOL 3350 17 G/17G
17 POWDER, FOR SOLUTION ORAL DAILY PRN
Status: DISCONTINUED | OUTPATIENT
Start: 2022-11-26 | End: 2022-12-02 | Stop reason: HOSPADM

## 2022-11-26 RX ORDER — METOPROLOL SUCCINATE 25 MG/1
25 TABLET, EXTENDED RELEASE ORAL DAILY
Status: CANCELLED | OUTPATIENT
Start: 2022-11-27

## 2022-11-26 RX ORDER — IBUPROFEN 600 MG/1
600 TABLET ORAL EVERY 6 HOURS PRN
Status: DISCONTINUED | OUTPATIENT
Start: 2022-11-26 | End: 2022-12-02 | Stop reason: HOSPADM

## 2022-11-26 RX ORDER — ASPIRIN 81 MG/1
81 TABLET ORAL DAILY
Status: DISCONTINUED | OUTPATIENT
Start: 2022-11-27 | End: 2022-11-26 | Stop reason: SDUPTHER

## 2022-11-26 RX ORDER — CALCIUM CARBONATE 500(1250)
1 TABLET ORAL
Status: DISCONTINUED | OUTPATIENT
Start: 2022-11-27 | End: 2022-12-02 | Stop reason: HOSPADM

## 2022-11-26 RX ORDER — HYDROXYZINE HYDROCHLORIDE 25 MG/1
25 TABLET, FILM COATED ORAL
Status: DISCONTINUED | OUTPATIENT
Start: 2022-11-26 | End: 2022-12-02 | Stop reason: HOSPADM

## 2022-11-26 RX ORDER — METOPROLOL SUCCINATE 25 MG/1
25 TABLET, EXTENDED RELEASE ORAL DAILY
Status: CANCELLED | OUTPATIENT
Start: 2022-11-26

## 2022-11-26 RX ORDER — OLANZAPINE 5 MG/1
5 TABLET ORAL
Status: DISCONTINUED | OUTPATIENT
Start: 2022-11-26 | End: 2022-12-02 | Stop reason: HOSPADM

## 2022-11-26 RX ORDER — LEVOTHYROXINE SODIUM 0.05 MG/1
50 TABLET ORAL DAILY
Status: CANCELLED | OUTPATIENT
Start: 2022-11-27

## 2022-11-26 RX ORDER — QUETIAPINE FUMARATE 100 MG/1
100 TABLET, FILM COATED ORAL
Status: CANCELLED | OUTPATIENT
Start: 2022-11-26

## 2022-11-26 RX ORDER — IBUPROFEN 400 MG/1
400 TABLET ORAL EVERY 6 HOURS PRN
Status: DISCONTINUED | OUTPATIENT
Start: 2022-11-26 | End: 2022-12-02 | Stop reason: HOSPADM

## 2022-11-26 RX ORDER — DULOXETIN HYDROCHLORIDE 30 MG/1
30 CAPSULE, DELAYED RELEASE ORAL DAILY
Status: DISCONTINUED | OUTPATIENT
Start: 2022-11-26 | End: 2022-11-26 | Stop reason: SDUPTHER

## 2022-11-26 RX ORDER — PROPRANOLOL HYDROCHLORIDE 10 MG/1
5 TABLET ORAL EVERY 8 HOURS PRN
Status: DISCONTINUED | OUTPATIENT
Start: 2022-11-26 | End: 2022-12-02 | Stop reason: HOSPADM

## 2022-11-26 RX ORDER — LEVOTHYROXINE SODIUM 0.05 MG/1
50 TABLET ORAL DAILY
Status: DISCONTINUED | OUTPATIENT
Start: 2022-11-26 | End: 2022-11-26 | Stop reason: SDUPTHER

## 2022-11-26 RX ORDER — DULOXETIN HYDROCHLORIDE 30 MG/1
30 CAPSULE, DELAYED RELEASE ORAL DAILY
Status: CANCELLED | OUTPATIENT
Start: 2022-11-27

## 2022-11-26 RX ORDER — GABAPENTIN 300 MG/1
600 CAPSULE ORAL 2 TIMES DAILY
Status: DISCONTINUED | OUTPATIENT
Start: 2022-11-26 | End: 2022-11-29

## 2022-11-26 RX ORDER — ACETAMINOPHEN 325 MG/1
650 TABLET ORAL AS NEEDED
Status: DISCONTINUED | OUTPATIENT
Start: 2022-11-26 | End: 2022-12-02 | Stop reason: HOSPADM

## 2022-11-26 RX ORDER — OLANZAPINE 2.5 MG/1
2.5 TABLET ORAL
Status: DISCONTINUED | OUTPATIENT
Start: 2022-11-26 | End: 2022-12-02 | Stop reason: HOSPADM

## 2022-11-26 RX ORDER — IBUPROFEN 200 MG
1250 CAPSULE ORAL DAILY
Status: DISCONTINUED | OUTPATIENT
Start: 2022-11-26 | End: 2022-11-26 | Stop reason: SDUPTHER

## 2022-11-26 RX ORDER — QUETIAPINE FUMARATE 100 MG/1
100 TABLET, FILM COATED ORAL
Status: DISCONTINUED | OUTPATIENT
Start: 2022-11-26 | End: 2022-12-02 | Stop reason: HOSPADM

## 2022-11-26 RX ORDER — LORAZEPAM 2 MG/ML
1 INJECTION INTRAMUSCULAR
Status: DISCONTINUED | OUTPATIENT
Start: 2022-11-26 | End: 2022-12-02 | Stop reason: HOSPADM

## 2022-11-26 RX ORDER — ENOXAPARIN SODIUM 100 MG/ML
40 INJECTION SUBCUTANEOUS DAILY
Status: CANCELLED | OUTPATIENT
Start: 2022-11-27

## 2022-11-26 RX ORDER — PANTOPRAZOLE SODIUM 40 MG/1
40 TABLET, DELAYED RELEASE ORAL DAILY
Status: CANCELLED | OUTPATIENT
Start: 2022-11-27

## 2022-11-26 RX ORDER — BISACODYL 10 MG
10 SUPPOSITORY, RECTAL RECTAL DAILY PRN
Status: DISCONTINUED | OUTPATIENT
Start: 2022-11-26 | End: 2022-12-02 | Stop reason: HOSPADM

## 2022-11-26 RX ORDER — MAGNESIUM HYDROXIDE/ALUMINUM HYDROXICE/SIMETHICONE 120; 1200; 1200 MG/30ML; MG/30ML; MG/30ML
30 SUSPENSION ORAL EVERY 4 HOURS PRN
Status: DISCONTINUED | OUTPATIENT
Start: 2022-11-26 | End: 2022-12-02 | Stop reason: HOSPADM

## 2022-11-26 RX ORDER — MIDODRINE HYDROCHLORIDE 5 MG/1
10 TABLET ORAL 3 TIMES DAILY
Status: DISCONTINUED | OUTPATIENT
Start: 2022-11-26 | End: 2022-11-26 | Stop reason: SDUPTHER

## 2022-11-26 RX ORDER — LEVOTHYROXINE SODIUM 0.05 MG/1
50 TABLET ORAL DAILY
Status: CANCELLED | OUTPATIENT
Start: 2022-11-26

## 2022-11-26 RX ORDER — CALCIUM CARBONATE 300MG(750)
TABLET,CHEWABLE ORAL DAILY
Status: CANCELLED | OUTPATIENT
Start: 2022-11-26

## 2022-11-26 RX ORDER — MIDODRINE HYDROCHLORIDE 5 MG/1
10 TABLET ORAL 3 TIMES DAILY
Status: DISCONTINUED | OUTPATIENT
Start: 2022-11-26 | End: 2022-12-02 | Stop reason: HOSPADM

## 2022-11-26 RX ORDER — BENZTROPINE MESYLATE 1 MG/ML
1 INJECTION INTRAMUSCULAR; INTRAVENOUS
Status: DISCONTINUED | OUTPATIENT
Start: 2022-11-26 | End: 2022-12-02 | Stop reason: HOSPADM

## 2022-11-26 RX ORDER — ASPIRIN 81 MG/1
81 TABLET ORAL DAILY
Status: CANCELLED | OUTPATIENT
Start: 2022-11-27

## 2022-11-26 RX ORDER — CLOPIDOGREL BISULFATE 75 MG/1
75 TABLET ORAL DAILY
Status: CANCELLED | OUTPATIENT
Start: 2022-11-27

## 2022-11-26 RX ORDER — DULOXETIN HYDROCHLORIDE 60 MG/1
60 CAPSULE, DELAYED RELEASE ORAL DAILY
Status: CANCELLED | OUTPATIENT
Start: 2022-11-26

## 2022-11-26 RX ORDER — ATORVASTATIN CALCIUM 20 MG/1
20 TABLET, FILM COATED ORAL DAILY
Status: DISCONTINUED | OUTPATIENT
Start: 2022-11-26 | End: 2022-12-02 | Stop reason: HOSPADM

## 2022-11-26 RX ORDER — OLANZAPINE 10 MG/1
5 INJECTION, POWDER, LYOPHILIZED, FOR SOLUTION INTRAMUSCULAR
Status: DISCONTINUED | OUTPATIENT
Start: 2022-11-26 | End: 2022-12-02 | Stop reason: HOSPADM

## 2022-11-26 RX ORDER — TRAZODONE HYDROCHLORIDE 50 MG/1
50 TABLET ORAL
Status: DISCONTINUED | OUTPATIENT
Start: 2022-11-26 | End: 2022-12-02 | Stop reason: HOSPADM

## 2022-11-26 RX ORDER — ONDANSETRON 2 MG/ML
4 INJECTION INTRAMUSCULAR; INTRAVENOUS EVERY 6 HOURS PRN
Status: CANCELLED | OUTPATIENT
Start: 2022-11-26

## 2022-11-26 RX ORDER — IBUPROFEN 200 MG
1250 CAPSULE ORAL DAILY
Status: CANCELLED | OUTPATIENT
Start: 2022-11-26

## 2022-11-26 RX ORDER — METOPROLOL SUCCINATE 25 MG/1
25 TABLET, EXTENDED RELEASE ORAL DAILY
Status: DISCONTINUED | OUTPATIENT
Start: 2022-11-26 | End: 2022-11-26 | Stop reason: SDUPTHER

## 2022-11-26 RX ORDER — DULOXETIN HYDROCHLORIDE 30 MG/1
30 CAPSULE, DELAYED RELEASE ORAL DAILY
Status: DISCONTINUED | OUTPATIENT
Start: 2022-11-27 | End: 2022-11-27

## 2022-11-26 RX ORDER — CLOPIDOGREL BISULFATE 75 MG/1
75 TABLET ORAL DAILY
Status: DISCONTINUED | OUTPATIENT
Start: 2022-11-27 | End: 2022-11-27

## 2022-11-26 RX ADMIN — LEVOTHYROXINE SODIUM 50 MCG: 50 TABLET ORAL at 06:03

## 2022-11-26 RX ADMIN — ATORVASTATIN CALCIUM 20 MG: 20 TABLET, FILM COATED ORAL at 12:25

## 2022-11-26 RX ADMIN — ASPIRIN 81 MG: 81 TABLET ORAL at 08:08

## 2022-11-26 RX ADMIN — MELATONIN TAB 3 MG 3 MG: 3 TAB at 21:22

## 2022-11-26 RX ADMIN — MIDODRINE HYDROCHLORIDE 10 MG: 5 TABLET ORAL at 17:10

## 2022-11-26 RX ADMIN — MIDODRINE HYDROCHLORIDE 10 MG: 5 TABLET ORAL at 21:22

## 2022-11-26 RX ADMIN — QUETIAPINE FUMARATE 100 MG: 100 TABLET ORAL at 21:22

## 2022-11-26 RX ADMIN — MAGNESIUM OXIDE TAB 400 MG (241.3 MG ELEMENTAL MG) 400 MG: 400 (241.3 MG) TAB at 08:08

## 2022-11-26 RX ADMIN — CLOPIDOGREL BISULFATE 75 MG: 75 TABLET ORAL at 08:07

## 2022-11-26 RX ADMIN — DULOXETINE HYDROCHLORIDE 30 MG: 30 CAPSULE, DELAYED RELEASE ORAL at 08:07

## 2022-11-26 RX ADMIN — ENOXAPARIN SODIUM 40 MG: 40 INJECTION SUBCUTANEOUS at 08:08

## 2022-11-26 RX ADMIN — METOPROLOL SUCCINATE 25 MG: 25 TABLET, EXTENDED RELEASE ORAL at 08:07

## 2022-11-26 RX ADMIN — GABAPENTIN 600 MG: 300 CAPSULE ORAL at 12:25

## 2022-11-26 RX ADMIN — PANTOPRAZOLE SODIUM 40 MG: 40 TABLET, DELAYED RELEASE ORAL at 06:03

## 2022-11-26 RX ADMIN — CALCIUM 1 TABLET: 500 TABLET ORAL at 08:07

## 2022-11-26 RX ADMIN — GABAPENTIN 600 MG: 300 CAPSULE ORAL at 17:11

## 2022-11-26 NOTE — PLAN OF CARE
Problem: PAIN - ADULT  Goal: Verbalizes/displays adequate comfort level or baseline comfort level  Description: Interventions:  - Encourage patient to monitor pain and request assistance  - Assess pain using appropriate pain scale  - Administer analgesics based on type and severity of pain and evaluate response  - Implement non-pharmacological measures as appropriate and evaluate response  - Consider cultural and social influences on pain and pain management  - Notify physician/advanced practitioner if interventions unsuccessful or patient reports new pain  Outcome: Progressing     Problem: INFECTION - ADULT  Goal: Absence or prevention of progression during hospitalization  Description: INTERVENTIONS:  - Assess and monitor for signs and symptoms of infection  - Monitor lab/diagnostic results  - Monitor all insertion sites, i e  indwelling lines, tubes, and drains  - Monitor endotracheal if appropriate and nasal secretions for changes in amount and color  - [place] appropriate cooling/warming therapies per order  - Administer medications as ordered  - Instruct and encourage patient and family to use good hand hygiene technique  - Identify and instruct in appropriate isolation precautions for identified infection/condition  Outcome: Progressing     Problem: DISCHARGE PLANNING  Goal: Discharge to home or other facility with appropriate resources  Description: INTERVENTIONS:  - Identify barriers to discharge w/patient and caregiver  - Arrange for needed discharge resources and transportation as appropriate  - Identify discharge learning needs (meds, wound care, etc )  - Arrange for interpretive services to assist at discharge as needed  - Refer to Case Management Department for coordinating discharge planning if the patient needs post-hospital services based on physician/advanced practitioner order or complex needs related to functional status, cognitive ability, or social support system  Outcome: Progressing

## 2022-11-26 NOTE — NURSING NOTE
CTS transport pickup via wheelchair at this time to [facility]  Report called to receiving nurse by Porter Pruett RN

## 2022-11-26 NOTE — NURSING NOTE
Patient presents from [address] [place] [address] ED under 201 status for increased anxiety and depression  Patient states she had an episode the night before Thanksgiving where her  found her on the floor and she has no recollection on how she got there  States  took her to [facility] ED after  Patient tearful during admission assessment, however, cooperative  Denies anxiety, depression, SI/HI/AH/VH  BMAT 4  Monitored for safety and support

## 2022-11-26 NOTE — DISCHARGE SUMMARY
Geovani 46  Discharge- Butch Gallagher 1955, 78 y o  female MRN: [id number]  Unit/Bed#: [address] [place] [address] Encounter: [id number]  Primary Care Provider: Seth Hay MD   Date and time admitted to hospital: 11/24/2022  7:27 AM    * Acute metabolic encephalopathy  Assessment & Plan  Presented to hospital secondary to altered mental status changes noticed by  at home  Patient poor historian, history is obtained by discussing with  on phone  He indicated that he went to bed around 10 pm and at the time his wife was at baseline  He got up during the night and found her sitting in a chair instead of sleeping in their bed  He reported that he went to the bathroom and when he came back she was lying on the floor, crying  He asked her what had happened and she kept on telling him over and over again," I do not know"  He was suspicious, initially, that she fell and hit her head, tripping over 1 of their large dogs  This prompted him to bring the patient to the emergency department  · CTA of head and neck: "No acute intracranial abnormality  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 "  · CXR: unremarkable  · TSH/UDS/B12/UA within normal limits  · Vit d panel pending    · Per , in the 15 years that they have been , 10 of those 15 years the patient has wound up in the hospital on holidays throughout the year with similar symptoms  · Reports that patient had been following with outpatient psychiatry, reported that a month ago she told him that she no longer needed to be followed by Psychiatry  · Mentation improved  Now awake and alert     · Psychiatry consult appreciated  · Recommend voluntary IP psych admission   · Patient is been discharged to [facility] in stable condition      Anxiety and depression  Assessment & Plan  As noted above,  reported that patient had been following with outpatient psychiatry but stopped following with them about 1 month ago  · Continue holding Seroquel and Neurontin  · Consulted psychiatry who recommend voluntary IP psych admission   · Consult Cymbalta  · Supportive care    Adult hypothyroidism  Assessment & Plan  TSH within normal limits   · Continue levothyroxine 50 mcg    Orthostatic hypotension  Assessment & Plan  Patient has a history of orthostatic hypotension    She underwent a tilt-table in March 2020  She has a loop recorder  SBP dropped from 113 -> 96 mmHg   · Continue Midodrine 10 mg TID and SIDNEY stockings   · Discharge blood pressure in the 150s      Hypothermia-resolved as of 11/25/2022  Assessment & Plan  Hypothermia present on admission as evidence by temperature of 43 3° requiring application of Qi Hugger  Temperature normalized  OK to discontinue PPI Problems     Resolved Problems  Date Reviewed: 11/26/2022          Resolved    Hypothermia 11/25/2022     Resolved by  SHRAVAN Paulson        Discharging Physician / Practitioner: Jessica Pickett  PCP: Eliceo Patel MD  Admission Date:   Admission Orders (From admission, onward)     Ordered        11/24/22 1003  Place in Observation  Once                      Discharge Date: 11/26/22    Consultations During Hospital Stay:  · Psychiatric    Procedures Performed:   · None    Significant Findings / Test Results:   · None      Test Results Pending at Discharge (will require follow up): · None     Outpatient Tests Requested:  · None    Complications:  None    Reason for Admission:  Altered mental status    Hospital Course:   Castillo Irby is a 78 y o  female patient who originally presented to the hospital on 11/24/2022 due to altered mental status change  ED CTA of head and neck done showed chronic focal occlusion just beyond the origin of the right vertebral artery and chest x-ray showed no cardiopulmonary disease  Temperature on arrival was 96 3 and Qi hugger was applied    Patient was noted with OSMAN which has since resolved with gentle hydration  Creatinine on discharge is 0 81  Psychiatric was consulted and recommended inpatient psych admission  Patient is been discharged to [address] to continue care  Please see above list of diagnoses and related plan for additional information  Condition at Discharge: stable    Discharge Day Visit / Exam:     Subjective:  Patient seen and examined at bedside  Stated that she feels better  Denies chest pain, abdominal pain, lightheadedness, nausea or vomiting    Vitals: Blood Pressure: 150/87 (11/26/22 0749)  Pulse: 71 (11/26/22 0749)  Temperature: 98 8 °F (37 1 °C) (11/26/22 0749)  Temp Source: Temporal (11/25/22 1223)  Respirations: 18 (11/26/22 0749)  Height: 5' 5" (165 1 cm) (11/24/22 0733)  Weight - Scale: 74 3 kg (163 lb 12 8 oz) (11/24/22 0733)  SpO2: 92 % (11/26/22 0749)  Exam:   Physical Exam  Constitutional:       Appearance: She is not ill-appearing  HENT:      Head: Normocephalic  Mouth/Throat:      Mouth: Mucous membranes are moist    Eyes:      General: No scleral icterus  Cardiovascular:      Rate and Rhythm: Normal rate  Heart sounds: Normal heart sounds  Pulmonary:      Breath sounds: Normal breath sounds  Abdominal:      General: Bowel sounds are normal       Palpations: Abdomen is soft  Musculoskeletal:         General: Normal range of motion  Cervical back: Normal range of motion  Skin:     General: Skin is dry  Capillary Refill: Capillary refill takes less than 2 seconds  Neurological:      Mental Status: She is alert and oriented to person, place, and time  Psychiatric:         Mood and Affect: Mood is depressed  Affect is flat  Discussion with Family: Attempted to update  () via phone  Left voicemail  Discharge instructions/Information to patient and family:   See after visit summary for information provided to patient and family  Provisions for Follow-Up Care:  See after visit summary for information related to follow-up care and any pertinent home health orders  Disposition:   Inpatient Psychiatry at [facility] Readmission: No     Discharge Statement:  I spent 30 minutes discharging the patient  This time was spent on the day of discharge  I had direct contact with the patient on the day of discharge  Greater than 50% of the total time was spent examining patient, answering all patient questions, arranging and discussing plan of care with patient as well as directly providing post-discharge instructions  Additional time then spent on discharge activities  Discharge Medications:  See after visit summary for reconciled discharge medications provided to patient and/or family        **Please Note: This note may have been constructed using a voice recognition system**

## 2022-11-26 NOTE — PLAN OF CARE
Problem: Ineffective Coping  Goal: Cooperates with admission process  Description: Interventions:   - Complete admission process  Outcome: Completed  Goal: Identifies ineffective coping skills  Outcome: Not Progressing  Goal: Identifies healthy coping skills  Outcome: Not Progressing  Goal: Demonstrates healthy coping skills  Outcome: Not Progressing  Goal: Participates in unit activities  Description: Interventions:  - Provide therapeutic environment   - Provide required programming   - Redirect inappropriate behaviors   Outcome: Not Progressing  Goal: Patient/Family participate in treatment and DC plans  Description: Interventions:  - Provide therapeutic environment  Outcome: Not Progressing  Goal: Patient/Family verbalizes awareness of resources  Outcome: Not Progressing  Goal: Understands least restrictive measures  Description: Interventions:  - Utilize least restrictive behavior  Outcome: Not Progressing  Goal: Free from restraint events  Description: - Utilize least restrictive measures   - Provide behavioral interventions   - Redirect inappropriate behaviors   Outcome: Not Progressing

## 2022-11-27 PROBLEM — F39 MOOD DISORDER (HCC): Status: ACTIVE | Noted: 2022-11-27

## 2022-11-27 PROBLEM — G45.9 TIA (TRANSIENT ISCHEMIC ATTACK): Status: ACTIVE | Noted: 2022-11-27

## 2022-11-27 RX ORDER — DULOXETIN HYDROCHLORIDE 60 MG/1
60 CAPSULE, DELAYED RELEASE ORAL DAILY
Status: DISCONTINUED | OUTPATIENT
Start: 2022-11-28 | End: 2022-12-02 | Stop reason: HOSPADM

## 2022-11-27 RX ORDER — DULOXETIN HYDROCHLORIDE 30 MG/1
30 CAPSULE, DELAYED RELEASE ORAL DAILY
Status: COMPLETED | OUTPATIENT
Start: 2022-11-27 | End: 2022-11-27

## 2022-11-27 RX ADMIN — MELATONIN TAB 3 MG 3 MG: 3 TAB at 21:24

## 2022-11-27 RX ADMIN — DULOXETINE HYDROCHLORIDE 30 MG: 30 CAPSULE, DELAYED RELEASE ORAL at 09:52

## 2022-11-27 RX ADMIN — DULOXETINE HYDROCHLORIDE 30 MG: 30 CAPSULE, DELAYED RELEASE ORAL at 09:51

## 2022-11-27 RX ADMIN — MIDODRINE HYDROCHLORIDE 10 MG: 5 TABLET ORAL at 16:56

## 2022-11-27 RX ADMIN — GABAPENTIN 600 MG: 300 CAPSULE ORAL at 08:57

## 2022-11-27 RX ADMIN — PANTOPRAZOLE SODIUM 40 MG: 40 TABLET, DELAYED RELEASE ORAL at 06:20

## 2022-11-27 RX ADMIN — MIDODRINE HYDROCHLORIDE 10 MG: 5 TABLET ORAL at 09:01

## 2022-11-27 RX ADMIN — MIDODRINE HYDROCHLORIDE 10 MG: 5 TABLET ORAL at 21:24

## 2022-11-27 RX ADMIN — QUETIAPINE FUMARATE 100 MG: 100 TABLET ORAL at 21:24

## 2022-11-27 RX ADMIN — GABAPENTIN 600 MG: 300 CAPSULE ORAL at 17:01

## 2022-11-27 RX ADMIN — LEVOTHYROXINE SODIUM 50 MCG: 50 TABLET ORAL at 06:20

## 2022-11-27 RX ADMIN — CLOPIDOGREL BISULFATE 75 MG: 75 TABLET ORAL at 09:01

## 2022-11-27 RX ADMIN — CALCIUM 1 TABLET: 500 TABLET ORAL at 09:57

## 2022-11-27 RX ADMIN — ASPIRIN 81 MG: 81 TABLET, COATED ORAL at 09:01

## 2022-11-27 RX ADMIN — MAGNESIUM OXIDE TAB 400 MG (241.3 MG ELEMENTAL MG) 400 MG: 400 (241.3 MG) TAB at 08:57

## 2022-11-27 RX ADMIN — ATORVASTATIN CALCIUM 20 MG: 20 TABLET, FILM COATED ORAL at 09:01

## 2022-11-27 NOTE — NURSING NOTE
Patient calm, cooperative, polite and pleasant  She has attended two impromptu weekend group sessions with active participation  She is able to make needs known and medication compliant  She denies anxiety, depression, SI/HI/AVH and reports sleeping well  She is visible on the millieu and sociable with select peers

## 2022-11-27 NOTE — CONSULTS
[facility] 1955, 78 y o  female MRN: [id number]  Unit/Bed#: Claudeen Cullens 956-13 Encounter: [id number]  Primary Care Provider: Antionette Tompkins MD   Date and time admitted to hospital: 11/26/2022 11:27 AM    Inpatient consult for Medical Clearance for [facility] patient  Consult performed by: Lawyer Carolnia MD  Consult ordered by: Yonny Nelson MD          Medical clearance for psychiatric admission  Assessment & Plan  · Patient is medically clear for [facility] admission  · Vital signs stable including blood pressure  · CBC and BMP unremarkable  · Normal T4 lipid profile, vitamin B12, vitamin-D    TIA (transient ischemic attack)  Assessment & Plan  · Patient was recently hospitalized with questionable TIA and discharged on 10/31/22, received TNK during that hospitalization  · Patient had loop recorder  · She was instructed to take aspirin and Plavix for 21 days followed by aspirin only  · Continue aspirin 81 mg daily  · Continue statin    Acute metabolic encephalopathy  Assessment & Plan  · Patient was recently hospitalized in [address] [place] [address] for acute metabolic encephalopathy, currently AAO x4  · No focal neurological deficit  · Imaging was negative for acute intracranial abnormality  · Normal B12 level   · Bladder scan showed >600cc urine this morning  Continue urinary retention protocol  · Neuro check every shift due to recent metabolic encephalopathy  · Continue supportive care    Anxiety and depression  Assessment & Plan  · Management per psychiatrist    Neuropathy  Assessment & Plan  · Continue gabapentin    Mild asthma  Assessment & Plan  · Not in acute exacerbation  · Continue Ventolin inhaler p r n      Adult hypothyroidism  Assessment & Plan  · Continue Synthroid 50 mcg    Mixed dyslipidemia  Assessment & Plan  · Lipid profile acceptable, continue atorvastatin    Orthostatic hypotension  Assessment & Plan  · Vital signs stable  · Continue compression stocking, Midodrine with holding parameters  · Continue to monitor    History of recent intraspinal surgery  Assessment & Plan  · History of 2 cervical surgeries for cervical stenosis  · Patient following Neurosurgery as an outpatient  · She noticed numbness in middle fingers bilaterally  · Continue to monitor    * Mood disorder [facility])  Assessment & Plan  · Management per primary service      Total Time for Visit, including Counseling / Coordination of Care: 45 minutes  Greater than 50% of this total time spent on direct patient counseling and coordination of care  Chief Complaint:   Medical management    History of Present Illness:    Florentin Jolley is a 78 y o  female with PMH of TIA, orthostatic hypotension, mood disorder, acquired hypothyroidism, anxiety and depression, GERD, osteoarthritis, HLD who was admitted to [facility] for mood disorder under 201  During my encounter, patient stated she did not recall anything  She was sitting on recliner and next thing, she was found on the floor by her   Her  was very concerned about her because she does not look like who she is  She also had recent hospitalization for possible stroke in late October and she was in ICU because she got clot Buster  Patient has been doing fine since after hospitalization  She also had history of cervical stenosis and follow-up with neurosurgeon  She had 2 cervical surgeries in the past       Review of Systems:    Review of Systems Patient was seen and examined at bedside  The patient has numbness in bilateral middle fingers but denies other sensation changes, muscle weakness, pain, headache, blurry vision, chest pain, palpitation, shortness of breath, N/V, abd pain        Past Medical and Surgical History:     Past Medical History:   Diagnosis Date   • Abdominal adhesions    • Anesthesia complication     difficult to wake up   • Anxiety    • Arthritis    • Cancer (HCC)     melanoma   • Depression    • Depression    • Disease of thyroid gland    • Fibromyalgia    • Fibromyalgia, primary    • Fractures 2006   • GERD (gastroesophageal reflux disease)    • History of cholecystectomy 09/07/2019   • Hyperlipidemia    • Irregular heart beat     can be tachy; also has orthoststic hypotension   • Lazy eye     resolved: 3/27/17   • Medical clearance for psychiatric admission 07/13/2021   • Melanoma ([facility] [address] ) 2010   • Osteoarthritis 07/2018   • Osteopenia     with joint pain-elevated DEV   • Psychiatric disorder    • Shortness of breath     assoc with tachycardia   • Stomach disorder 1995   • Tinnitus    • Wears glasses     for reading       Past Surgical History:   Procedure Laterality Date   • ABDOMINAL SURGERY      lysis of adhesions x 2   • APPENDECTOMY     • CERVICAL FUSION      May 5,2021 and Jan 01,2020   • CHOLECYSTECTOMY      open   • COLONOSCOPY     • DILATION AND CURETTAGE OF UTERUS     • FOOT SURGERY  05/2017   • NECK SURGERY  01/2019 5/2021   • NEUROMA EXCISION Right 05/26/2017    Procedure: EXCISION MASS / FIBROMA FOOT;  Surgeon: Noel Mchugh DPM;  Location: [address] [place] [address];  Service:    • PARS PLANA VITRECTOMY W/ REPAIR OF MACULAR HOLE  12/23/2020   • CT XCAPSL CTRC RMVL INSJ IO LENS PROSTH W/O ECP Left 12/06/2021    Procedure: EXTRACTION EXTRACAPSULAR CATARACT PHACO INTRAOCULAR LENS (IOL); Surgeon: Pam Mason MD;  Location: [facility] MAIN OR;  Service: Ophthalmology   • RIGHT OOPHORECTOMY     • WISDOM TOOTH EXTRACTION      x4       Meds/Allergies:    Prior to Admission medications    Medication Sig Start Date End Date Taking?  Authorizing Provider   acetaminophen (TYLENOL) 650 mg CR tablet if needed    Historical Provider, MD   albuterol (ProAir HFA) 90 mcg/act inhaler Inhale 2 puffs every 6 (six) hours as needed for wheezing 5/21/22   Carmen Scanlon DO   albuterol (ProAir HFA) 90 mcg/act inhaler Inhale 2 puffs every 6 (six) hours as needed for wheezing 11/10/22   SHRAVAN Godinez   atorvastatin (LIPITOR) 20 mg tablet TAKE 1 TABLET BY MOUTH EVERY DAY 9/12/22   Cash Yu MD   calcium carbonate (OS-WILLY) 1250 (500 Ca) MG tablet Take 1,250 mg by mouth    Historical Provider, MD   [facility] Aspirin Adult Low Strength 81 MG EC tablet CHEW AND SWALLOW 1 TABLET BY MOUTH ONCE DAILY 11/4/22   Historical Provider, MD   DULoxetine (CYMBALTA) 30 mg delayed release capsule Take 1 capsule (30 mg total) by mouth daily 8/28/22 2/24/23  YURI Arshad   DULoxetine (CYMBALTA) 60 mg delayed release capsule Take 1 capsule (60 mg total) by mouth daily 8/28/22   YURI Arshad   gabapentin (NEURONTIN) 300 mg capsule Take 2 capsules (600 mg total) by mouth 2 (two) times a day  Patient taking differently: Take 900 mg by mouth 2 (two) times a day 6/7/22   Brandt Salgado MD   levothyroxine 50 mcg tablet TAKE 1 TABLET BY MOUTH EVERY DAY 10/28/22   SHRAVAN Godinez   Magnesium 400 MG TABS Take by mouth daily    Historical Provider, MD   metoprolol succinate (TOPROL-XL) 25 mg 24 hr tablet Take 25 mg by mouth daily 2/25/22   Historical Provider, MD   midodrine (PROAMATINE) 10 MG tablet TAKE 1 TABLET BY MOUTH THREE TIMES A DAY 10/3/22   Brandt Salgado MD   pantoprazole (PROTONIX) 40 mg tablet Take 1 tablet (40 mg total) by mouth daily 11/10/22   SHRAVAN Godinez   QUEtiapine (SEROquel) 100 mg tablet Take 1 tablet (100 mg total) by mouth daily at bedtime 8/28/22 2/24/23  YURI Arshad     I have reviewed home medications with patient personally  Allergies:    Allergies   Allergen Reactions   • Meperidine Lightheadedness and Other (See Comments)   • Sulfa Antibiotics Hives       Social History:     Marital Status: /Civil Union     Substance Use History:   Social History     Substance and Sexual Activity   Alcohol Use Not Currently     Social History     Tobacco Use   Smoking Status Never   Smokeless Tobacco Never     Social History     Substance and Sexual Activity   Drug Use No       Family History:    Family History   Problem Relation Age of Onset   • Heart disease Mother • Stroke Mother 58   • COPD Mother    • Arthritis Mother    • Hypertension Father    • Kidney disease Brother         kidney transplant   • Multiple sclerosis Sister    • No Known Problems Maternal Aunt    • No Known Problems Maternal Uncle    • No Known Problems Paternal Aunt    • No Known Problems Paternal Uncle    • No Known Problems Maternal Grandmother    • No Known Problems Maternal Grandfather    • No Known Problems Paternal Grandmother    • No Known Problems Paternal Grandfather    • Dislocations Sister    • Neurological problems Sister    • Scoliosis Sister    • ADD / ADHD Neg Hx    • Anesthesia problems Neg Hx    • Cancer Neg Hx    • Clotting disorder Neg Hx    • Collagen disease Neg Hx    • Diabetes Neg Hx    • Dislocations Neg Hx    • Learning disabilities Neg Hx    • Neurological problems Neg Hx    • Osteoporosis Neg Hx    • Rheumatologic disease Neg Hx    • Scoliosis Neg Hx    • Vascular Disease Neg Hx        Physical Exam:     Vitals:   Blood Pressure: 117/77 (11/27/22 1523)  Pulse: 86 (11/27/22 1523)  Temperature: (!) 97 4 °F (36 3 °C) (11/27/22 1523)  Temp Source: Temporal (11/27/22 1523)  Respirations: 16 (11/27/22 1523)  Weight - Scale: 71 1 kg (156 lb 12 8 oz) (11/26/22 1148)  SpO2: 100 % (11/27/22 1523)    Physical Exam      General: breathing well on room air, no acute distress  HEENT: NC/AT, PERRL, EOM - normal  Neck: Supple  Pulm/Chest: Normal chest wall expansion, clear breath sounds on both side, no wheezing/rhonchi or crackles appreciated  CVS: RRR, normal S1&S2, no murmur appreciated, capillary refill <2s  Abd: soft, non tender, non distended, bowel sounds +  MSK: move all 4 extremities spontaneously  Skin: warm  CNS: no acute focal neuro deficit      Additional Data:     Lab Results: I have personally reviewed pertinent reports        Results from last 7 days   Lab Units 11/25/22  0547 11/24/22  0812   WBC Thousand/uL 7 25 5 50   HEMOGLOBIN g/dL 13 2 12 7   HEMATOCRIT % 41 8 40 5 PLATELETS Thousands/uL 225 225   NEUTROS PCT %  --  65   LYMPHS PCT %  --  24   MONOS PCT %  --  9   EOS PCT %  --  2     Results from last 7 days   Lab Units 11/26/22  0513 11/25/22  0547 11/24/22  0812   SODIUM mmol/L 143   < > 145   POTASSIUM mmol/L 4 3   < > 4 3   CHLORIDE mmol/L 109*   < > 109*   CO2 mmol/L 25   < > 29   BUN mg/dL 11   < > 20   CREATININE mg/dL 0 81   < > 0 89   ANION GAP mmol/L 9   < > 7   CALCIUM mg/dL 8 9   < > 8 8   ALBUMIN g/dL  --   --  3 4*   TOTAL BILIRUBIN mg/dL  --   --  0 32   ALK PHOS U/L  --   --  72   ALT U/L  --   --  47   AST U/L  --   --  21   GLUCOSE RANDOM mg/dL 95   < > 144*    < > = values in this interval not displayed  Results from last 7 days   Lab Units 11/24/22  0738   POC GLUCOSE mg/dl 135         Results from last 7 days   Lab Units 11/26/22  0513   PROCALCITONIN ng/ml 0 06       Imaging: I have personally reviewed pertinent reports  No orders to display       [facility] / [employer] Records Reviewed: Yes     ** Please Note: This note has been constructed using a voice recognition system   **

## 2022-11-27 NOTE — TREATMENT PLAN
TREATMENT PLAN REVIEW - [address] 78 y o  1955 female MRN: [id number]    [address] [address] [facility] [facility]se [facility] 450-85 Encounter: [id number]          Admit Date/Time:  11/26/2022 11:27 AM    Treatment Team: Attending Provider: Destin Mccann MD; Patient Care Assistant: Raeann Romero; Patient Care Assistant: Kathie Patel; Patient Care Assistant: Glenna Delatorre; Patient Care Assistant: Tomeka Acevedo;  Registered Nurse: Marsha Trejo RN    Diagnosis: Principal Problem:    Mood disorder ([address] )  Active Problems:    Orthostatic hypotension    Mixed dyslipidemia    Adult hypothyroidism    Medical clearance for psychiatric admission    Anxiety and depression    Acute metabolic encephalopathy    TIA (transient ischemic attack)      Patient Strengths/Assets: cooperative, communication skills, compliant with medication, good past treatment response, motivation for treatment/growth, patient is on a voluntary commitment, patient is willing to work on problems    Patient Barriers/Limitations: difficulty adapting, marital/family conflict, poor insight, poor reasoning ability, poor self-care    Short Term Goals: decrease in depressive symptoms, ability to stay safe on the unit, improvement in insight, improvement in reasoning ability, improvement in self care, sleep improvement, improvement in appetite, mood stabilization, increase in group attendance, increase in socialization with peers on the unit, acceptance of need for psychiatric treatment    Long Term Goals: improvement in depression, improved insight, adequate self care, adequate sleep, adequate appetite, adequate oral intake, appropriate interaction with peers, appropriate interaction with family, stable living arrangements upon discharge    Progress Towards Goals: continue psychiatric medications as prescribed, improving    Recommended Treatment: medication management, patient medication education, group therapy, milieu therapy, continued Behavioral Health psychiatric evaluation/assessment process    Treatment Frequency: daily medication monitoring, group and milieu therapy daily, monitoring through interdisciplinary rounds, monitoring through weekly patient care conferences    Expected Discharge Date:  3-7days    Discharge Plan: referrals as indicated    Treatment Plan Created/Updated By: Fátima Grady MD

## 2022-11-27 NOTE — PLAN OF CARE
Problem: Ineffective Coping  Goal: Identifies ineffective coping skills  Outcome: Progressing  Goal: Identifies healthy coping skills  Outcome: Progressing  Goal: Demonstrates healthy coping skills  Outcome: Progressing  Goal: Participates in unit activities  Description: Interventions:  - Provide therapeutic environment   - Provide required programming   - Redirect inappropriate behaviors   Outcome: Progressing  Goal: Patient/Family participate in treatment and DC plans  Description: Interventions:  - Provide therapeutic environment  Outcome: Progressing  Goal: Patient/Family verbalizes awareness of resources  Outcome: Progressing  Goal: Understands least restrictive measures  Description: Interventions:  - Utilize least restrictive behavior  Outcome: Progressing  Goal: Free from restraint events  Description: - Utilize least restrictive measures   - Provide behavioral interventions   - Redirect inappropriate behaviors   Outcome: Progressing     Problem: Depression  Goal: Treatment Goal: Demonstrate behavioral control of depressive symptoms, verbalize feelings of improved mood/affect, and adopt new coping skills prior to discharge  Outcome: Progressing  Goal: Verbalize thoughts and feelings  Description: Interventions:  - Assess and re-assess patient's level of risk   - Engage patient in 1:1 interactions, daily, for a minimum of 15 minutes   - Encourage patient to express feelings, fears, frustrations, hopes   Outcome: Progressing  Goal: Refrain from harming self  Description: Interventions:  - Monitor patient closely, per order   - Supervise medication ingestion, monitor effects and side effects   Outcome: Progressing  Goal: Refrain from isolation  Description: Interventions:  - Develop a trusting relationship   - Encourage socialization   Outcome: Progressing  Goal: Refrain from self-neglect  Outcome: Progressing  Goal: Attend and participate in unit activities, including therapeutic, recreational, and educational groups  Description: Interventions:  - Provide therapeutic and educational activities daily, encourage attendance and participation, and document same in the medical record   Outcome: Progressing  Goal: Complete daily ADLs, including personal hygiene independently, as able  Description: Interventions:  - Observe, teach, and assist patient with ADLS  -  Monitor and promote a balance of rest/activity, with adequate nutrition and elimination   Outcome: Progressing

## 2022-11-27 NOTE — ASSESSMENT & PLAN NOTE
· Patient was recently hospitalized in [address] [place] [address] for acute metabolic encephalopathy, currently AAO x4  · No focal neurological deficit  · Imaging was negative for acute intracranial abnormality  · Normal B12 level   · Bladder scan showed >600cc urine this morning    Continue urinary retention protocol  · Neuro check every shift due to recent metabolic encephalopathy  · Continue supportive care

## 2022-11-27 NOTE — ASSESSMENT & PLAN NOTE
· History of 2 cervical surgeries for cervical stenosis  · Patient following Neurosurgery as an outpatient  · She noticed numbness in middle fingers bilaterally  · Continue to monitor

## 2022-11-27 NOTE — NURSING NOTE
Patient bladder scan @ 0930 = 654mL    Patient able to void independently 1x within 30 minutes with no difficulty  Patient voided 1 unmeasured urine and 60mL measured urine  Patient bladder scan @ 1416 = 0mL  Patient has no complaints and no difficulty with urinating  at this time
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
## 2022-11-27 NOTE — ASSESSMENT & PLAN NOTE
· Patient is medically clear for [facility] [place], operated by [facility] admission  · Vital signs stable including blood pressure  · CBC and BMP unremarkable  · Normal T4 lipid profile, vitamin B12, vitamin-D

## 2022-11-27 NOTE — ASSESSMENT & PLAN NOTE
· Patient was recently hospitalized with questionable TIA and discharged on 10/31/22, received TNK during that hospitalization  · Patient had loop recorder  · She was instructed to take aspirin and Plavix for 21 days followed by aspirin only  · Continue aspirin 81 mg daily  · Continue statin

## 2022-11-27 NOTE — ASSESSMENT & PLAN NOTE
· Vital signs stable  · Continue compression stocking, Midodrine with holding parameters  · Continue to monitor

## 2022-11-28 RX ADMIN — GABAPENTIN 600 MG: 300 CAPSULE ORAL at 08:37

## 2022-11-28 RX ADMIN — DULOXETINE 60 MG: 60 CAPSULE, DELAYED RELEASE ORAL at 08:36

## 2022-11-28 RX ADMIN — GABAPENTIN 600 MG: 300 CAPSULE ORAL at 17:04

## 2022-11-28 RX ADMIN — MELATONIN TAB 3 MG 3 MG: 3 TAB at 21:20

## 2022-11-28 RX ADMIN — QUETIAPINE FUMARATE 100 MG: 100 TABLET ORAL at 21:20

## 2022-11-28 RX ADMIN — ATORVASTATIN CALCIUM 20 MG: 20 TABLET, FILM COATED ORAL at 08:36

## 2022-11-28 RX ADMIN — MAGNESIUM OXIDE TAB 400 MG (241.3 MG ELEMENTAL MG) 400 MG: 400 (241.3 MG) TAB at 08:36

## 2022-11-28 RX ADMIN — MIDODRINE HYDROCHLORIDE 10 MG: 5 TABLET ORAL at 21:20

## 2022-11-28 RX ADMIN — MIDODRINE HYDROCHLORIDE 10 MG: 5 TABLET ORAL at 08:36

## 2022-11-28 RX ADMIN — LEVOTHYROXINE SODIUM 50 MCG: 50 TABLET ORAL at 05:42

## 2022-11-28 RX ADMIN — CALCIUM 1 TABLET: 500 TABLET ORAL at 08:36

## 2022-11-28 RX ADMIN — PANTOPRAZOLE SODIUM 40 MG: 40 TABLET, DELAYED RELEASE ORAL at 05:42

## 2022-11-28 RX ADMIN — MIDODRINE HYDROCHLORIDE 10 MG: 5 TABLET ORAL at 16:43

## 2022-11-28 RX ADMIN — ASPIRIN 81 MG: 81 TABLET, COATED ORAL at 08:36

## 2022-11-28 NOTE — NURSING NOTE
Patient calm, cooperative, polite and pleasant  She is talkative with this nurse with general conversation and is able to make needs known  She brightens upon approach and denies anxiety, depression, SI/HI/AVH  She reports sleeping well, visible on the millieu and sociable with select peers  Patient enjoys reading books while on unit and was provided with two thus far which she has read

## 2022-11-28 NOTE — PROGRESS NOTES
11/28/22 1301   Team Meeting   Meeting Type Daily Rounds   Initial Conference Date 11/28/22   Next Conference Date 11/29/22   Team Members Present   Team Members Present Physician;;Nurse;;Occupational Therapist   Physician Team Member Dr Brandyn Davidson Team Member [id number] Management Team Member Erlinda Work Team Member Puja   OT Team Member Raymundo Salcido   Patient/Family Present   Patient Present No   Patient's Family Present No     New admit, increased depression, tearful, cooperative, taking meds, fell at home, confused at home, dr believes this could be a hypertensive issue  Med changes

## 2022-11-28 NOTE — PLAN OF CARE
Pt attends group and participates      Problem: Depression  Goal: Attend and participate in unit activities, including therapeutic, recreational, and educational groups  Description: Interventions:  - Provide therapeutic and educational activities daily, encourage attendance and participation, and document same in the medical record   Outcome: Progressing

## 2022-11-28 NOTE — PROGRESS NOTES
Progress Note - Behavioral Health   Huyen Dejesus 78 y o  female MRN: [id number]  Unit/Bed#: Emily Leal 278-69 Encounter: [id number]    Assessment/Plan   Principal Problem:    Mood disorder ([address] )  Active Problems:    History of recent intraspinal surgery    Orthostatic hypotension    Mixed dyslipidemia    Adult hypothyroidism    Mild asthma    Neuropathy    Medical clearance for psychiatric admission    Anxiety and depression    Acute metabolic encephalopathy    TIA (transient ischemic attack)  Patient reports feeling more accepting comfortable being in in the hospital   [facility] what happened to her prior to admission that the made her feel so confused and “out of it “also reports  was frightened is a sees such it acute mental status change in her were prior to this episode she was feeling well with the was not reporting any a a depression or anxiety to any extent  Patient has a history of orthostatic hypotension and the low blood pressure and pulse    But has been on a beta-blocker for several months and remains on high dose of duloxetine which can cause orthostasis    Behavior over the last 24 hours:  unchanged  Sleep: normal  Appetite: normal  Medication side effects: No  ROS: no complaints    Mental Status Evaluation:  Appearance:  age appropriate   Behavior:  guarded   Speech:  normal pitch and normal volume   Mood:  anxious and depressed   Affect:  blunted and constricted   Thought Process:  tangential   Thought Content:  normal   Perceptual Disturbances: None   Risk Potential: Suicidal Ideations none  Homicidal Ideations none  Potential for Aggression No   Sensorium:  person, place, and situation   Memory:  recent and remote memory grossly intact   Consciousness:  alert and awake    Attention: attention span appeared shorter than expected for age   Insight:  fair   Judgment: fair   Gait/Station: normal gait/station   Motor Activity: no abnormal movements     Progress Toward Goals:  No change    Recommended Treatment: Continue with group therapy, milieu therapy and occupational therapy  Risks, benefits and possible side effects of Medications:   Risks, benefits, and possible side effects of medications explained to patient and patient verbalizes understanding  Medications: planned medication changes: Do Cymbalta to 60 mg a m  And DC the metoprolol due to her low blood pressure and the pulse  Labs: I have personally reviewed all pertinent laboratory/tests results  Most Recent Labs:   Lab Results   Component Value Date    WBC 7 25 11/25/2022    RBC 4 84 11/25/2022    HGB 13 2 11/25/2022    HCT 41 8 11/25/2022     11/25/2022    RDW 15 4 (H) 11/25/2022    NEUTROABS 3 52 11/24/2022    SODIUM 143 11/26/2022    K 4 3 11/26/2022     (H) 11/26/2022    CO2 25 11/26/2022    BUN 11 11/26/2022    CREATININE 0 81 11/26/2022    GLUC 95 11/26/2022    GLUF 109 (H) 11/25/2022    CALCIUM 8 9 11/26/2022    AST 21 11/24/2022    ALT 47 11/24/2022    ALKPHOS 72 11/24/2022    TP 6 4 11/24/2022    ALB 3 4 (L) 11/24/2022    TBILI 0 32 11/24/2022    CHOLESTEROL 147 11/25/2022    HDL 61 11/25/2022    TRIG 116 11/25/2022    LDLCALC 63 11/25/2022    XIF4UUGVSYGB 3 629 11/25/2022    FREET4 0 76 10/20/2020    T3FREE 2 14 (L) 10/20/2020    HGBA1C 6 1 (H) 02/06/2020     02/06/2020       Counseling / Coordination of Care  Total floor / unit time spent today 25 minutes  Greater than 50% of total time was spent with the patient and / or family counseling and / or coordination of care   A description of the counseling / coordination of care:

## 2022-11-28 NOTE — PROGRESS NOTES
11/28/22 1641   Referral Data   Referral Reason [address]   Readmission Root Cause   30 Day Readmission No   Patient Information   Mental Status Alert   Primary Caregiver Self   Support System Immediate family;Friends;Community   Mandaen/Cultural Requests Oriental orthodox   Legal Information   Current Status: 201   Advance Directives   (none)   Health Care Proxy Appointed No   Activities of Daily Living Prior to Admission   Functional Status Independent   Assistive Device No device needed   Living Arrangement Lives with someone  (spouse)   Ambulation Independent   Access to Firearms   Access to Firearms No   Income [address] Employed  (, SSD, lottery earnings)   [facility] Transport to [facility]: Drives Self

## 2022-11-28 NOTE — PROGRESS NOTES
11/28/22 1644   Patient Intake   Living Arrangement Lives with someone  (spouse)   Can patient return home? Yes   Address to be Discharge to: [address]   Patient's Telephone Number [phone]   Access to Firearms No   Work History Part-time; Retired;Disabled   School Grade/Year College senior  (Bachelors in [address] [place], [place] [address] [facility])   Admission Status   Status of Admission [address]   Patient History   Currently in Treatment Yes   Medical Problems low BP  fibromyalgia   Legal Issues none   Substance Abuse No   Crisis Info   Release of Information Signed Yes  Roseline More (spouse) [phone]; Misael Kimble (step-daughter) [phone]; [facility]; [address])

## 2022-11-28 NOTE — TREATMENT TEAM
Completed admission self assessment  Pt did indicate biggest stressor " not knowing what happened early Thanksgiving am when my  fond me on the floor as well as his concerns that I show more depression around the holidays   "  Pt likes most about her life "my family, my community involvement and staying active"  Pt likes least about her life "there really isn't anything"  Pt got her BA in Sociology and Psychology/  Pt works part time as a   Pt enjoys reading, card games, gardening ad doing puzzles on phone  Pt interested in groups on assertiveness, helping my family understand my illness and knowledge about my medication  Pt stated "when I communicate better with my   Increase knowledge about my medication and reengage with therapy"  I will be ready for d/c  Pt had no further questions or concerns  Reviewed group schedule  Encouraged pt to attend groups when able  11/28/22 0940   Activity/Group Checklist   Group Admission/Discharge   Attendance Attended   Attendance Duration (min) 31-45   Interactions Interacted appropriately   Affect/Mood Appropriate   Goals Achieved Identified feelings; Identified relapse prevention strategies; Discussed coping strategies; Able to engage in interactions; Able to listen to others

## 2022-11-28 NOTE — NURSING NOTE
Pt visible on the unit  Pleasant on approach and otherwise, observed be calm and cooperative within the milieu  Reported voiding without any concerns  Medication compliant this shift
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
## 2022-11-28 NOTE — PLAN OF CARE
Problem: Ineffective Coping  Goal: Identifies ineffective coping skills  Outcome: Progressing  Goal: Identifies healthy coping skills  Outcome: Progressing  Goal: Demonstrates healthy coping skills  Outcome: Progressing  Goal: Participates in unit activities  Description: Interventions:  - Provide therapeutic environment   - Provide required programming   - Redirect inappropriate behaviors   Outcome: Progressing  Goal: Patient/Family participate in treatment and DC plans  Description: Interventions:  - Provide therapeutic environment  Outcome: Progressing  Goal: Patient/Family verbalizes awareness of resources  Outcome: Progressing  Goal: Understands least restrictive measures  Description: Interventions:  - Utilize least restrictive behavior  Outcome: Progressing  Goal: Free from restraint events  Description: - Utilize least restrictive measures   - Provide behavioral interventions   - Redirect inappropriate behaviors   Outcome: Progressing     Problem: Depression  Goal: Treatment Goal: Demonstrate behavioral control of depressive symptoms, verbalize feelings of improved mood/affect, and adopt new coping skills prior to discharge  Outcome: Progressing  Goal: Verbalize thoughts and feelings  Description: Interventions:  - Assess and re-assess patient's level of risk   - Engage patient in 1:1 interactions, daily, for a minimum of 15 minutes   - Encourage patient to express feelings, fears, frustrations, hopes   Outcome: Progressing  Goal: Refrain from harming self  Description: Interventions:  - Monitor patient closely, per order   - Supervise medication ingestion, monitor effects and side effects   Outcome: Progressing  Goal: Refrain from isolation  Description: Interventions:  - Develop a trusting relationship   - Encourage socialization   Outcome: Progressing  Goal: Refrain from self-neglect  Outcome: Progressing  Goal: Complete daily ADLs, including personal hygiene independently, as able  Description: Interventions:  - Observe, teach, and assist patient with ADLS  -  Monitor and promote a balance of rest/activity, with adequate nutrition and elimination   Outcome: Progressing     Problem: DISCHARGE PLANNING  Goal: Discharge to home or other facility with appropriate resources  Description: INTERVENTIONS:  - Identify barriers to discharge w/patient and caregiver  - Arrange for needed discharge resources and transportation as appropriate  - Identify discharge learning needs (meds, wound care, etc )  - Arrange for interpretive services to assist at discharge as needed  - Refer to Case Management Department for coordinating discharge planning if the patient needs post-hospital services based on physician/advanced practitioner order or complex needs related to functional status, cognitive ability, or social support system  Outcome: Progressing

## 2022-11-29 RX ORDER — GABAPENTIN 300 MG/1
300 CAPSULE ORAL 2 TIMES DAILY
Status: DISCONTINUED | OUTPATIENT
Start: 2022-11-29 | End: 2022-11-29

## 2022-11-29 RX ORDER — GABAPENTIN 300 MG/1
300 CAPSULE ORAL 3 TIMES DAILY
Status: DISCONTINUED | OUTPATIENT
Start: 2022-11-29 | End: 2022-12-02 | Stop reason: HOSPADM

## 2022-11-29 RX ORDER — LANOLIN ALCOHOL/MO/W.PET/CERES
3 CREAM (GRAM) TOPICAL
Status: DISCONTINUED | OUTPATIENT
Start: 2022-11-29 | End: 2022-12-02 | Stop reason: HOSPADM

## 2022-11-29 RX ADMIN — LEVOTHYROXINE SODIUM 50 MCG: 50 TABLET ORAL at 05:10

## 2022-11-29 RX ADMIN — ASPIRIN 81 MG: 81 TABLET, COATED ORAL at 08:59

## 2022-11-29 RX ADMIN — GABAPENTIN 300 MG: 300 CAPSULE ORAL at 08:59

## 2022-11-29 RX ADMIN — GABAPENTIN 300 MG: 300 CAPSULE ORAL at 21:13

## 2022-11-29 RX ADMIN — MIDODRINE HYDROCHLORIDE 10 MG: 5 TABLET ORAL at 16:24

## 2022-11-29 RX ADMIN — GABAPENTIN 300 MG: 300 CAPSULE ORAL at 16:24

## 2022-11-29 RX ADMIN — MIDODRINE HYDROCHLORIDE 10 MG: 5 TABLET ORAL at 08:59

## 2022-11-29 RX ADMIN — CALCIUM 1 TABLET: 500 TABLET ORAL at 08:58

## 2022-11-29 RX ADMIN — QUETIAPINE FUMARATE 100 MG: 100 TABLET ORAL at 21:13

## 2022-11-29 RX ADMIN — DULOXETINE 60 MG: 60 CAPSULE, DELAYED RELEASE ORAL at 08:59

## 2022-11-29 RX ADMIN — MAGNESIUM OXIDE TAB 400 MG (241.3 MG ELEMENTAL MG) 400 MG: 400 (241.3 MG) TAB at 09:01

## 2022-11-29 RX ADMIN — ATORVASTATIN CALCIUM 20 MG: 20 TABLET, FILM COATED ORAL at 08:59

## 2022-11-29 RX ADMIN — PANTOPRAZOLE SODIUM 40 MG: 40 TABLET, DELAYED RELEASE ORAL at 05:10

## 2022-11-29 NOTE — NURSING NOTE
Pt visible in dayroom throughout shift and socialized with peers  She deines Anxiety and depression and states " I want to make sure everything is okay " Pt denies SI/HI and AH/VH  She denies pain this shift  Her LBM was 11/27  Pt appeared to be sleeping at 15:00  Pt is compliant with medications and care

## 2022-11-29 NOTE — PROGRESS NOTES
Progress Note - Behavioral Health   Thiago Hawley 78 y o  female MRN: [id number]  Unit/Bed#: Rollen Hodgkin 982-17 Encounter: [id number]    Assessment/Plan   Principal Problem:    Mood disorder ([address] )  Active Problems:    History of recent intraspinal surgery    Orthostatic hypotension    Mixed dyslipidemia    Adult hypothyroidism    Mild asthma    Neuropathy    Medical clearance for psychiatric admission    Anxiety and depression    Acute metabolic encephalopathy    TIA (transient ischemic attack)  Patient reports feeling well and is hopeful that the medication changes will continue to help her stabilize without any further episodes like she had several days ago  Blood pressure and pulse seemed to be reasonable despite stopping the Toprol  She also reports of using less gabapentin which he likes because of potential side effects that she has had in the past that medication      Patient understands all the medication changes that have happened during the last several days and is pleased as well as her  is pleased    Behavior over the last 24 hours:  improved  Sleep: normal  Appetite: normal  Medication side effects: No  ROS: no complaints    Mental Status Evaluation:  Appearance:  age appropriate   Behavior:  guarded   Speech:  normal pitch and normal volume   Mood:  anxious and depressed   Affect:  blunted and constricted   Thought Process:  tangential   Thought Content:  normal   Perceptual Disturbances: None   Risk Potential: Suicidal Ideations none  Homicidal Ideations none  Potential for Aggression No   Sensorium:  person, place, and situation   Memory:  recent and remote memory grossly intact   Consciousness:  alert and awake    Attention: attention span appeared shorter than expected for age   Insight:  fair   Judgment: fair   Gait/Station: normal gait/station   Motor Activity: no abnormal movements     Progress Toward Goals:  Improving    Recommended Treatment: Continue with group therapy, milieu therapy and occupational therapy  Risks, benefits and possible side effects of Medications:   Risks, benefits, and possible side effects of medications explained to patient and patient verbalizes understanding        Medications:   current meds:   Current Facility-Administered Medications   Medication Dose Route Frequency   • acetaminophen (TYLENOL) tablet 650 mg  650 mg Oral Q6H PRN   • acetaminophen (TYLENOL) tablet 650 mg  650 mg Oral PRN   • albuterol (PROVENTIL HFA,VENTOLIN HFA) inhaler 2 puff  2 puff Inhalation Q6H PRN   • aluminum-magnesium hydroxide-simethicone (MYLANTA) oral suspension 30 mL  30 mL Oral Q4H PRN   • aspirin (ECOTRIN LOW STRENGTH) EC tablet 81 mg  81 mg Oral Daily   • atorvastatin (LIPITOR) tablet 20 mg  20 mg Oral Daily   • benztropine (COGENTIN) injection 1 mg  1 mg Intramuscular Q4H PRN Max 6/day   • benztropine (COGENTIN) tablet 0 5 mg  0 5 mg Oral Q4H PRN Max 6/day   • bisacodyl (DULCOLAX) rectal suppository 10 mg  10 mg Rectal Daily PRN   • calcium carbonate (OYSTER SHELL,OSCAL) 500 mg tablet 1 tablet  1 tablet Oral Daily With Breakfast   • DULoxetine (CYMBALTA) delayed release capsule 60 mg  60 mg Oral Daily   • gabapentin (NEURONTIN) capsule 300 mg  300 mg Oral TID   • glycerin-hypromellose- (ARTIFICIAL TEARS) ophthalmic solution 1 drop  1 drop Both Eyes Q3H PRN   • hydrOXYzine HCL (ATARAX) tablet 25 mg  25 mg Oral Q6H PRN Max 4/day   • hydrOXYzine HCL (ATARAX) tablet 50 mg  50 mg Oral Q6H PRN Max 4/day   • ibuprofen (MOTRIN) tablet 400 mg  400 mg Oral Q6H PRN   • ibuprofen (MOTRIN) tablet 600 mg  600 mg Oral Q6H PRN   • levothyroxine tablet 50 mcg  50 mcg Oral Daily   • LORazepam (ATIVAN) injection 1 mg  1 mg Intramuscular Q6H PRN Max 3/day   • magnesium oxide (MAG-OX) tablet   Oral Daily   • melatonin tablet 3 mg  3 mg Oral HS PRN   • midodrine (PROAMATINE) tablet 10 mg  10 mg Oral TID   • mirtazapine (REMERON) tablet 7 5 mg  7 5 mg Oral HS PRN   • OLANZapine (ZyPREXA) IM injection 5 mg  5 mg Intramuscular Q3H PRN Max 3/day   • OLANZapine (ZyPREXA) tablet 2 5 mg  2 5 mg Oral Q4H PRN Max 6/day   • OLANZapine (ZyPREXA) tablet 5 mg  5 mg Oral Q4H PRN Max 3/day   • OLANZapine (ZyPREXA) tablet 5 mg  5 mg Oral Q3H PRN Max 3/day   • pantoprazole (PROTONIX) EC tablet 40 mg  40 mg Oral Daily   • pneumococcal 23-valent polysaccharide vaccine (PNEUMOVAX-23) injection 0 5 mL  0 5 mL Subcutaneous Prior to discharge   • polyethylene glycol (MIRALAX) packet 17 g  17 g Oral Daily PRN   • propranolol (INDERAL) tablet 5 mg  5 mg Oral Q8H PRN   • QUEtiapine (SEROquel) tablet 100 mg  100 mg Oral HS   • senna-docusate sodium (SENOKOT S) 8 6-50 mg per tablet 1 tablet  1 tablet Oral Daily PRN   • traZODone (DESYREL) tablet 50 mg  50 mg Oral Q6H PRN Max 3/day     Labs: Reduce Neurontin to 300 mg t i d    Most Recent Labs:   Lab Results   Component Value Date    WBC 7 25 11/25/2022    RBC 4 84 11/25/2022    HGB 13 2 11/25/2022    HCT 41 8 11/25/2022     11/25/2022    RDW 15 4 (H) 11/25/2022    NEUTROABS 3 52 11/24/2022    SODIUM 143 11/26/2022    K 4 3 11/26/2022     (H) 11/26/2022    CO2 25 11/26/2022    BUN 11 11/26/2022    CREATININE 0 81 11/26/2022    GLUC 95 11/26/2022    GLUF 109 (H) 11/25/2022    CALCIUM 8 9 11/26/2022    AST 21 11/24/2022    ALT 47 11/24/2022    ALKPHOS 72 11/24/2022    TP 6 4 11/24/2022    ALB 3 4 (L) 11/24/2022    TBILI 0 32 11/24/2022    CHOLESTEROL 147 11/25/2022    HDL 61 11/25/2022    TRIG 116 11/25/2022    LDLCALC 63 11/25/2022    MZV7KUPKTHKC 3 629 11/25/2022    FREET4 0 76 10/20/2020    T3FREE 2 14 (L) 10/20/2020    HGBA1C 6 1 (H) 02/06/2020     02/06/2020       Counseling / Coordination of Care  Total floor / unit time spent today 25 minutes  Greater than 50% of total time was spent with the patient and / or family counseling and / or coordination of care   A description of the counseling / coordination of care:

## 2022-11-29 NOTE — PROGRESS NOTES
11/29/22 1400   Team Meeting   Meeting Type Tx Team Meeting   Initial Conference Date 11/29/22   Next Conference Date 12/27/22   Team Members Present   Team Members Present Physician;Nurse;   Physician Team Member Dr Homer Richmond Team Member [facility] Management Team Member Hellen   Patient/Family Present   Patient Present Yes   Patient's Family Present No     Patient is agreeable

## 2022-11-29 NOTE — TREATMENT TEAM
Pt attended ALL groups  Pt able to self express  Pt noted she needs to work on communication when dealing with conflict  Pt noted she tends to bottle things up  Pt displayed self awareness of mh recovery needs  11/29/22 2530   Activity/Group Checklist   Group Other (Comment)  (Positive coping skills)   Attendance Attended   Attendance Duration (min) 31-45   Interactions Interacted appropriately   Affect/Mood Appropriate   Goals Achieved Identified feelings; Able to listen to others; Able to engage in interactions; Discussed coping strategies; Able to self-disclose; Able to recieve feedback; Able to manage/cope with feelings; Able to reflect/comment on own behavior;Verbalized increased hopefulness

## 2022-11-29 NOTE — TREATMENT TEAM
Met with pt to complete  relapse prevention plan  Pt signed and copy chart,  Crisis, warmline and 988 number provided  Pt noted signs and symptoms upon amdisison were: "medication not working as expected and any lacks of interest in doing things"  Pt hopeful of d/c at end of week  Pt attends groups  11/29/22 1000   Activity/Group Checklist   Group Admission/Discharge   Attendance Attended   Attendance Duration (min) 16-30   Interactions Interacted appropriately   Affect/Mood Appropriate   Goals Achieved Identified feelings; Able to engage in interactions; Able to listen to others; Identified relapse prevention strategies; Identified resources and support systems

## 2022-11-29 NOTE — PROGRESS NOTES
11/29/22 1210   Team Meeting   Meeting Type Daily Rounds   Initial Conference Date 11/29/22   Next Conference Date 11/30/22   Team Members Present   Team Members Present Physician;Nurse;;Occupational Therapist;   Physician Team Member Dr Gail Hernandez Management Team Member Erlinda Work Team Member Puja   OT Team Member Louis Branch   Patient/Family Present   Patient Present No   Patient's Family Present No     Calm, pleasant, cooperative, social, bright, deny s/s, med compliant, slept, attending groups  Fri discharge

## 2022-11-30 RX ADMIN — GABAPENTIN 300 MG: 300 CAPSULE ORAL at 16:44

## 2022-11-30 RX ADMIN — GABAPENTIN 300 MG: 300 CAPSULE ORAL at 08:54

## 2022-11-30 RX ADMIN — GABAPENTIN 300 MG: 300 CAPSULE ORAL at 21:32

## 2022-11-30 RX ADMIN — LEVOTHYROXINE SODIUM 50 MCG: 50 TABLET ORAL at 05:57

## 2022-11-30 RX ADMIN — QUETIAPINE FUMARATE 100 MG: 100 TABLET ORAL at 21:32

## 2022-11-30 RX ADMIN — CALCIUM 1 TABLET: 500 TABLET ORAL at 08:54

## 2022-11-30 RX ADMIN — MAGNESIUM OXIDE TAB 400 MG (241.3 MG ELEMENTAL MG) 400 MG: 400 (241.3 MG) TAB at 08:10

## 2022-11-30 RX ADMIN — PANTOPRAZOLE SODIUM 40 MG: 40 TABLET, DELAYED RELEASE ORAL at 05:57

## 2022-11-30 RX ADMIN — MIDODRINE HYDROCHLORIDE 10 MG: 5 TABLET ORAL at 08:11

## 2022-11-30 RX ADMIN — ASPIRIN 81 MG: 81 TABLET, COATED ORAL at 08:11

## 2022-11-30 RX ADMIN — MIDODRINE HYDROCHLORIDE 10 MG: 5 TABLET ORAL at 16:44

## 2022-11-30 RX ADMIN — ATORVASTATIN CALCIUM 20 MG: 20 TABLET, FILM COATED ORAL at 08:11

## 2022-11-30 RX ADMIN — MIDODRINE HYDROCHLORIDE 10 MG: 5 TABLET ORAL at 21:32

## 2022-11-30 RX ADMIN — DULOXETINE 60 MG: 60 CAPSULE, DELAYED RELEASE ORAL at 08:11

## 2022-11-30 NOTE — NURSING NOTE
Pt visible in dayroom, socializing with peers  She denies anxiety and depression  Pt denies denies SI/HI and AH/VH  Pt is compliant with medications and care

## 2022-11-30 NOTE — PLAN OF CARE
Problem: Ineffective Coping  Goal: Identifies ineffective coping skills  Outcome: Progressing  Goal: Identifies healthy coping skills  Outcome: Progressing  Goal: Demonstrates healthy coping skills  Outcome: Progressing  Goal: Participates in unit activities  Description: Interventions:  - Provide therapeutic environment   - Provide required programming   - Redirect inappropriate behaviors   Outcome: Progressing  Goal: Patient/Family participate in treatment and DC plans  Description: Interventions:  - Provide therapeutic environment  Outcome: Progressing  Goal: Patient/Family verbalizes awareness of resources  Outcome: Progressing  Goal: Understands least restrictive measures  Description: Interventions:  - Utilize least restrictive behavior  Outcome: Progressing  Goal: Free from restraint events  Description: - Utilize least restrictive measures   - Provide behavioral interventions   - Redirect inappropriate behaviors   Outcome: Progressing     Problem: Depression  Goal: Treatment Goal: Demonstrate behavioral control of depressive symptoms, verbalize feelings of improved mood/affect, and adopt new coping skills prior to discharge  Outcome: Progressing  Goal: Verbalize thoughts and feelings  Description: Interventions:  - Assess and re-assess patient's level of risk   - Engage patient in 1:1 interactions, daily, for a minimum of 15 minutes   - Encourage patient to express feelings, fears, frustrations, hopes   Outcome: Progressing  Goal: Refrain from harming self  Description: Interventions:  - Monitor patient closely, per order   - Supervise medication ingestion, monitor effects and side effects   Outcome: Progressing  Goal: Refrain from isolation  Description: Interventions:  - Develop a trusting relationship   - Encourage socialization   Outcome: Progressing  Goal: Refrain from self-neglect  Outcome: Progressing  Goal: Attend and participate in unit activities, including therapeutic, recreational, and educational groups  Description: Interventions:  - Provide therapeutic and educational activities daily, encourage attendance and participation, and document same in the medical record   Outcome: Progressing  Goal: Complete daily ADLs, including personal hygiene independently, as able  Description: Interventions:  - Observe, teach, and assist patient with ADLS  -  Monitor and promote a balance of rest/activity, with adequate nutrition and elimination   Outcome: Progressing     Problem: DISCHARGE PLANNING  Goal: Discharge to home or other facility with appropriate resources  Description: INTERVENTIONS:  - Identify barriers to discharge w/patient and caregiver  - Arrange for needed discharge resources and transportation as appropriate  - Identify discharge learning needs (meds, wound care, etc )  - Arrange for interpretive services to assist at discharge as needed  - Refer to Case Management Department for coordinating discharge planning if the patient needs post-hospital services based on physician/advanced practitioner order or complex needs related to functional status, cognitive ability, or social support system  Outcome: Progressing

## 2022-11-30 NOTE — NURSING NOTE
Faye Pruett is calm, pleasant, and cooperative on approach  Faye Pruett denies all  Faye Pruett states she has a hard time falling asleep  Faye Pruett denies anxiety  Faye Pruett states her depression is 1/10  Faye Pruett attends group  Ina showered for the day  Faye Pruett states she is eating well  Faye Pruett is appropriate  Faye Pruett is visible on the unit  Faye Pruett is med compliant  Will continue to monitor and assess for safety

## 2022-11-30 NOTE — NURSING NOTE
Pt visible, pleasant, and social  Currently denies all s/s  States she "feels much better" and expresses satisfaction with medication  Reports last BM 11/29  Will maintain q7min checks

## 2022-11-30 NOTE — TREATMENT TEAM
Pt attends all groups and participates  Pt making slow and steady progress towards mh recovery goals  Improved communication and less anxious  11/30/22 1330   Activity/Group Checklist   Group Other (Comment)  (communication and boundaries)   Attendance Attended   Attendance Duration (min) 31-45   Interactions Interacted appropriately   Affect/Mood Appropriate   Goals Achieved Identified feelings; Able to listen to others; Able to engage in interactions; Able to manage/cope with feelings; Able to reflect/comment on own behavior;Verbalized increased hopefulness; Able to self-disclose;Discussed coping strategies; Discussed self-esteem issues

## 2022-11-30 NOTE — PROGRESS NOTES
11/30/22 0940   Team Meeting   Meeting Type Daily Rounds   Initial Conference Date 11/30/22   Next Conference Date 12/01/22   Team Members Present   Team Members Present Physician;Nurse;;   Physician Team Member Dr Cole Davenport Team Member [address] Management Team Member Erlinda Work Team Member Ismael Del Rosario   Patient/Family Present   Patient Present No   Patient's Family Present No     Deny s/s, pleasant, social, visible, no changes  Fri discharge

## 2022-11-30 NOTE — PROGRESS NOTES
Progress Note - Behavioral Health   Alfonzo Morales 78 y o  female MRN: [id number]  Unit/Bed#: [facility] 989-96 Encounter: [id number]    Assessment/Plan   Principal Problem:    Mood disorder ([address] )  Active Problems:    History of recent intraspinal surgery    Orthostatic hypotension    Mixed dyslipidemia    Adult hypothyroidism    Mild asthma    Neuropathy    Medical clearance for psychiatric admission    Anxiety and depression    Acute metabolic encephalopathy    TIA (transient ischemic attack)  Patient reports feeling better may have some anxiety but the depression has improved  Her blood pressure and pulse seem to be stabilizing on the change meds  She does have some pain in her leg but she feels it is not related to reduction of her Neurontin  She is does participate in therapeutic activities  Had some difficulty falling asleep last night but maybe said that went to bed too early which he will try to change tonight      At all in all she is pleased with her progress at this point as well as her   Behavior over the last 24 hours:  improved  Sleep: insomnia  Appetite: normal  Medication side effects: No  ROS: no complaints    Mental Status Evaluation:  Appearance:  age appropriate   Behavior:  guarded   Speech:  normal pitch and normal volume   Mood:  anxious and depressed   Affect:  blunted and constricted   Thought Process:  tangential   Thought Content:  normal   Perceptual Disturbances: None   Risk Potential: Suicidal Ideations none  Homicidal Ideations none  Potential for Aggression No   Sensorium:  person, place, and situation   Memory:  recent and remote memory grossly intact   Consciousness:  alert and awake    Attention: attention span appeared shorter than expected for age   Insight:  fair   Judgment: fair   Gait/Station: normal gait/station   Motor Activity: no abnormal movements     Progress Toward Goals:  Slowly improving    Recommended Treatment: Continue with group therapy, milieu therapy and occupational therapy  Risks, benefits and possible side effects of Medications:   Risks, benefits, and possible side effects of medications explained to patient and patient verbalizes understanding  Medications: all current active meds have been reviewed  Labs: I have personally reviewed all pertinent laboratory/tests results  Most Recent Labs:   Lab Results   Component Value Date    WBC 7 25 11/25/2022    RBC 4 84 11/25/2022    HGB 13 2 11/25/2022    HCT 41 8 11/25/2022     11/25/2022    RDW 15 4 (H) 11/25/2022    NEUTROABS 3 52 11/24/2022    SODIUM 143 11/26/2022    K 4 3 11/26/2022     (H) 11/26/2022    CO2 25 11/26/2022    BUN 11 11/26/2022    CREATININE 0 81 11/26/2022    GLUC 95 11/26/2022    GLUF 109 (H) 11/25/2022    CALCIUM 8 9 11/26/2022    AST 21 11/24/2022    ALT 47 11/24/2022    ALKPHOS 72 11/24/2022    TP 6 4 11/24/2022    ALB 3 4 (L) 11/24/2022    TBILI 0 32 11/24/2022    CHOLESTEROL 147 11/25/2022    HDL 61 11/25/2022    TRIG 116 11/25/2022    LDLCALC 63 11/25/2022    PDZ4ZCCOQDGX 3 629 11/25/2022    FREET4 0 76 10/20/2020    T3FREE 2 14 (L) 10/20/2020    HGBA1C 6 1 (H) 02/06/2020     02/06/2020       Counseling / Coordination of Care  Total floor / unit time spent today 25 minutes  Greater than 50% of total time was spent with the patient and / or family counseling and / or coordination of care   A description of the counseling / coordination of care:

## 2022-12-01 ENCOUNTER — TELEPHONE (OUTPATIENT)
Dept: NEUROLOGY | Facility: CLINIC | Age: 67
End: 2022-12-01

## 2022-12-01 RX ADMIN — CALCIUM 1 TABLET: 500 TABLET ORAL at 09:14

## 2022-12-01 RX ADMIN — ASPIRIN 81 MG: 81 TABLET, COATED ORAL at 08:16

## 2022-12-01 RX ADMIN — PANTOPRAZOLE SODIUM 40 MG: 40 TABLET, DELAYED RELEASE ORAL at 06:07

## 2022-12-01 RX ADMIN — DULOXETINE 60 MG: 60 CAPSULE, DELAYED RELEASE ORAL at 08:15

## 2022-12-01 RX ADMIN — ACETAMINOPHEN 650 MG: 325 TABLET ORAL at 09:14

## 2022-12-01 RX ADMIN — ATORVASTATIN CALCIUM 20 MG: 20 TABLET, FILM COATED ORAL at 08:15

## 2022-12-01 RX ADMIN — QUETIAPINE FUMARATE 100 MG: 100 TABLET ORAL at 21:42

## 2022-12-01 RX ADMIN — GABAPENTIN 300 MG: 300 CAPSULE ORAL at 16:28

## 2022-12-01 RX ADMIN — MIDODRINE HYDROCHLORIDE 10 MG: 5 TABLET ORAL at 16:27

## 2022-12-01 RX ADMIN — MAGNESIUM OXIDE TAB 400 MG (241.3 MG ELEMENTAL MG) 400 MG: 400 (241.3 MG) TAB at 08:16

## 2022-12-01 RX ADMIN — LEVOTHYROXINE SODIUM 50 MCG: 50 TABLET ORAL at 06:07

## 2022-12-01 RX ADMIN — GABAPENTIN 300 MG: 300 CAPSULE ORAL at 21:42

## 2022-12-01 RX ADMIN — MIDODRINE HYDROCHLORIDE 10 MG: 5 TABLET ORAL at 08:15

## 2022-12-01 RX ADMIN — GABAPENTIN 300 MG: 300 CAPSULE ORAL at 08:16

## 2022-12-01 NOTE — PROGRESS NOTES
12/01/22 1239   Team Meeting   Meeting Type Daily Rounds   Initial Conference Date 12/01/22   Next Conference Date 12/02/22   Team Members Present   Team Members Present Physician;Nurse;;; Occupational Therapist   Physician Team Member Dr Norman Peoples Management Team Member Erlinda Work Team Member Puja   OT Team Member Dayton Ortiz   Other (Discipline and Name) [address] [facility]   Patient/Family Present   Patient Present No   Patient's Family Present No     Calm, pleasant, cooperative, deny s/s, 1/10, attending groups, shower, eating and med compliant

## 2022-12-01 NOTE — TREATMENT TEAM
Pt attended mental health and recovery group  Pt did note that she wants to get more involved with AA meetings again and addressing her co-occurring needs  Pt demonstrates self awareness of mh recovery and oc-occurring needs  12/01/22 1000   Activity/Group Checklist   Group Other (Comment)  (Mental health relapse and recovery)   Attendance Attended   Attendance Duration (min) 31-45   Interactions Interacted appropriately   Affect/Mood Appropriate;Bright   Goals Achieved Identified feelings; Able to listen to others; Able to engage in interactions; Able to reflect/comment on own behavior;Able to manage/cope with feelings; Increased hopefulness; Discussed coping strategies

## 2022-12-01 NOTE — PROGRESS NOTES
12/01/22 1722   Discharge Planning   Living Arrangements Lives w/ Spouse/significant other   Support Systems Spouse/significant other   Assistance Needed none   Type of Current Residence Private residence   [address] No   Other Referral/Resources/Interventions Provided:   Referrals Provided: Psychiatrist;Therapist;Crisis Hotline   Discharge Communications   Discharge planning discussed with:  and patient   Freedom of Choice Yes   IMM Given (Date): 12/01/22   IMM Given to: Patient   Contacts   Patient Contacts Shahab Thompson   Contact Method Phone   Phone Number [phone]   Reason/Outcome Emergency Contact; Discharge Planning   [facility]   Would you like to participate in our [address] service program?   No - Declined

## 2022-12-01 NOTE — CASE MANAGEMENT
Called the patient's  to talk to him about discharge, he will be picking her up around 11:30 tomorrow  He did not have any questions other than making sure her meds were ok and she was ready to come home  I did mention that she has an appointment with psychiatry at the end of the month too

## 2022-12-01 NOTE — DISCHARGE INSTR - OTHER ORDERS
Upon discharge you will be returning home to [address]  After discharge, if you find your coping skills are not as effective and you continue to feel distressed please call Andrey Chung services are available 24 hours a day by calling [facility] [phone]  [place] Suicide Hotline : 1 437.489.1412    Crisis Text Line : 159053     If you feel you are a danger to yourself or others please contact 911 or go to nearest Emergency room to seek immediate help  Juliana Deras or Perla, our Saji and Hernán, will be calling you after your discharge, on the phone number that you provided  They will be available as an additional support, if needed  If you wish to speak with one of them, you may contact Wally Davenport at [phone] or Jessika Yang at [phone]

## 2022-12-01 NOTE — NURSING NOTE
Pt visible in dayroom, social with peers  Pt denies anxiety and depression  She denies SI/HI and AH/VH  She reports " If my roommate didn't snore so loud I would sleep better, but I'm not complaining " Pt reported a pain score of 3/10 in her left knee  Pt received PRN tylenol 650mg at 09:14  Medication effective  Pt reported LBM 11/30  Pt reported that she was having "mild" neck pain between 13:00-14:00  Pt received ice pack for pain  Icepack effective

## 2022-12-01 NOTE — PLAN OF CARE
Problem: Ineffective Coping  Goal: Identifies ineffective coping skills  Outcome: Progressing  Goal: Identifies healthy coping skills  Outcome: Progressing  Goal: Demonstrates healthy coping skills  Outcome: Progressing  Goal: Patient/Family participate in treatment and DC plans  Description: Interventions:  - Provide therapeutic environment  Outcome: Progressing  Goal: Patient/Family verbalizes awareness of resources  Outcome: Progressing  Goal: Understands least restrictive measures  Description: Interventions:  - Utilize least restrictive behavior  Outcome: Progressing  Goal: Free from restraint events  Description: - Utilize least restrictive measures   - Provide behavioral interventions   - Redirect inappropriate behaviors   Outcome: Progressing     Problem: Depression  Goal: Treatment Goal: Demonstrate behavioral control of depressive symptoms, verbalize feelings of improved mood/affect, and adopt new coping skills prior to discharge  Outcome: Progressing  Goal: Verbalize thoughts and feelings  Description: Interventions:  - Assess and re-assess patient's level of risk   - Engage patient in 1:1 interactions, daily, for a minimum of 15 minutes   - Encourage patient to express feelings, fears, frustrations, hopes   Outcome: Progressing  Goal: Refrain from harming self  Description: Interventions:  - Monitor patient closely, per order   - Supervise medication ingestion, monitor effects and side effects   Outcome: Progressing  Goal: Refrain from isolation  Description: Interventions:  - Develop a trusting relationship   - Encourage socialization   Outcome: Progressing  Goal: Refrain from self-neglect  Outcome: Progressing  Goal: Attend and participate in unit activities, including therapeutic, recreational, and educational groups  Description: Interventions:  - Provide therapeutic and educational activities daily, encourage attendance and participation, and document same in the medical record   Outcome: Progressing  Goal: Complete daily ADLs, including personal hygiene independently, as able  Description: Interventions:  - Observe, teach, and assist patient with ADLS  -  Monitor and promote a balance of rest/activity, with adequate nutrition and elimination   Outcome: Progressing

## 2022-12-01 NOTE — PROGRESS NOTES
Progress Note - Behavioral Health   Mc Rios 78 y o  female MRN: [id number]  Unit/Bed#: Melanie Gonzalez 807-44 Encounter: [id number]    Assessment/Plan   Principal Problem:    Mood disorder ([address] )  Active Problems:    History of recent intraspinal surgery    Orthostatic hypotension    Mixed dyslipidemia    Adult hypothyroidism    Mild asthma    Neuropathy    Medical clearance for psychiatric admission    Anxiety and depression    Acute metabolic encephalopathy    TIA (transient ischemic attack)    Patient is feeling a little shaky this morning and some more pain now that the Neurontin is reduced and has been taking p r n  Tylenol which she would prefer rather than taking extra Neurontin or other medication on a consistent basis  Patient's mood is bright and she is denying depression  Does attend activities in the keeps herself busy during the day  No new clinical issues  Behavior over the last 24 hours:  improved  Sleep: normal  Appetite: normal  Medication side effects: No  ROS: no complaints    Mental Status Evaluation:  Appearance:  age appropriate   Behavior:  guarded improving   Speech:  normal pitch and normal volume   Mood:  anxious and depressed   Affect:  blunted and constricted   Thought Process:  tangential   Thought Content:  normal   Perceptual Disturbances: None   Risk Potential: Suicidal Ideations none  Homicidal Ideations none  Potential for Aggression No   Sensorium:  person, place, and situation   Memory:  recent and remote memory grossly intact   Consciousness:  alert and awake    Attention: attention span appeared shorter than expected for age   Insight:  fair   Judgment: fair   Gait/Station: normal gait/station   Motor Activity: no abnormal movements     Progress Toward Goals:  Improving    Recommended Treatment: Continue with group therapy, milieu therapy and occupational therapy        Risks, benefits and possible side effects of Medications:   Risks, benefits, and possible side effects of medications explained to patient and patient verbalizes understanding  Medications: all current active meds have been reviewed  Labs: I have personally reviewed all pertinent laboratory/tests results  Most Recent Labs:   Lab Results   Component Value Date    WBC 7 25 11/25/2022    RBC 4 84 11/25/2022    HGB 13 2 11/25/2022    HCT 41 8 11/25/2022     11/25/2022    RDW 15 4 (H) 11/25/2022    NEUTROABS 3 52 11/24/2022    SODIUM 143 11/26/2022    K 4 3 11/26/2022     (H) 11/26/2022    CO2 25 11/26/2022    BUN 11 11/26/2022    CREATININE 0 81 11/26/2022    GLUC 95 11/26/2022    GLUF 109 (H) 11/25/2022    CALCIUM 8 9 11/26/2022    AST 21 11/24/2022    ALT 47 11/24/2022    ALKPHOS 72 11/24/2022    TP 6 4 11/24/2022    ALB 3 4 (L) 11/24/2022    TBILI 0 32 11/24/2022    CHOLESTEROL 147 11/25/2022    HDL 61 11/25/2022    TRIG 116 11/25/2022    LDLCALC 63 11/25/2022    FFB5KSNARANL 3 629 11/25/2022    FREET4 0 76 10/20/2020    T3FREE 2 14 (L) 10/20/2020    HGBA1C 6 1 (H) 02/06/2020     02/06/2020       Counseling / Coordination of Care  Total floor / unit time spent today 25 minutes  Greater than 50% of total time was spent with the patient and / or family counseling and / or coordination of care   A description of the counseling / coordination of care:

## 2022-12-01 NOTE — PROGRESS NOTES
12/01/22 1330   Activity/Group Checklist   Group Life Skills  (topic self awareness)   Attendance Attended  (arrived late reporting that she overslept after lunch )   Attendance Duration (min) 0-15   Interactions Interacted appropriately  (Pt  apologized to late arrival Displayed complimentary discussion to  for  having missed most of a group she had looked forward to  Pt  reported feeling o k  about tentative D/C tomorrow )   Affect/Mood Normal range   Goals Achieved Able to self-disclose;Discussed coping strategies; Identified feelings; Identified triggers

## 2022-12-02 VITALS
SYSTOLIC BLOOD PRESSURE: 118 MMHG | TEMPERATURE: 97.5 F | HEIGHT: 65 IN | OXYGEN SATURATION: 100 % | WEIGHT: 158.7 LBS | BODY MASS INDEX: 26.44 KG/M2 | DIASTOLIC BLOOD PRESSURE: 64 MMHG | RESPIRATION RATE: 16 BRPM | HEART RATE: 83 BPM

## 2022-12-02 DIAGNOSIS — K21.9 GASTROESOPHAGEAL REFLUX DISEASE, UNSPECIFIED WHETHER ESOPHAGITIS PRESENT: ICD-10-CM

## 2022-12-02 RX ORDER — PANTOPRAZOLE SODIUM 40 MG/1
TABLET, DELAYED RELEASE ORAL
Qty: 30 TABLET | Refills: 0 | Status: SHIPPED | OUTPATIENT
Start: 2022-12-02

## 2022-12-02 RX ORDER — GABAPENTIN 300 MG/1
300 CAPSULE ORAL 3 TIMES DAILY
Qty: 90 CAPSULE | Refills: 0
Start: 2022-12-02 | End: 2022-12-07 | Stop reason: SDUPTHER

## 2022-12-02 RX ORDER — MIDODRINE HYDROCHLORIDE 10 MG/1
10 TABLET ORAL 3 TIMES DAILY
Qty: 270 TABLET | Refills: 0 | Status: SHIPPED | OUTPATIENT
Start: 2022-12-02

## 2022-12-02 RX ADMIN — GABAPENTIN 300 MG: 300 CAPSULE ORAL at 08:28

## 2022-12-02 RX ADMIN — DULOXETINE 60 MG: 60 CAPSULE, DELAYED RELEASE ORAL at 08:28

## 2022-12-02 RX ADMIN — LEVOTHYROXINE SODIUM 50 MCG: 50 TABLET ORAL at 05:46

## 2022-12-02 RX ADMIN — CALCIUM 1 TABLET: 500 TABLET ORAL at 08:28

## 2022-12-02 RX ADMIN — PANTOPRAZOLE SODIUM 40 MG: 40 TABLET, DELAYED RELEASE ORAL at 05:46

## 2022-12-02 RX ADMIN — MAGNESIUM OXIDE TAB 400 MG (241.3 MG ELEMENTAL MG) 400 MG: 400 (241.3 MG) TAB at 08:28

## 2022-12-02 RX ADMIN — ATORVASTATIN CALCIUM 20 MG: 20 TABLET, FILM COATED ORAL at 08:28

## 2022-12-02 RX ADMIN — ASPIRIN 81 MG: 81 TABLET, COATED ORAL at 08:29

## 2022-12-02 RX ADMIN — MIDODRINE HYDROCHLORIDE 10 MG: 5 TABLET ORAL at 08:29

## 2022-12-02 NOTE — NURSING NOTE
avs reviewed with pt, verbalized understanding  Pt walking off unit with [facility]  Sent with belongings and AVS  No signs of distress noted at time of discharge

## 2022-12-02 NOTE — NURSING NOTE
Pt is visible in small tv room and social with peer  Pt working on puzzle with peer, pleasant on approach  Pt denies s/s

## 2022-12-02 NOTE — BH TRANSITION RECORD
Contact Information: If you have any questions, concerns, pended studies, tests and/or procedures, or emergencies regarding your inpatient behavioral health visit  Please contact [facility] older adult behavioral health unit 6B [phone] and ask to speak to a , nurse or physician  A contact is available 24 hours/ 7 days a week at this number  Summary of Procedures Performed During your Stay:  Below is a list of major procedures performed during your hospital stay and a summary of results:  - No major procedures performed  Pending Studies (From admission, onward)    None        Please follow up on the above pending studies with your PCP and/or referring provider

## 2022-12-02 NOTE — CASE MANAGEMENT
Case Management Assessment & Discharge Planning Note    Patient name Barbi Schwarz  Location [facility] 644/[facility] 398-89 MRN [id number]  : 1955 Date 2022       Current Admission Date: 2022  Current Admission Diagnosis:Mood disorder [facility])   Patient Active Problem List    Diagnosis Date Noted   • TIA (transient ischemic attack) 2022   • Mood disorder ([facility] ) 2022   • Acute metabolic encephalopathy 4278   • Anxiety and depression 11/10/2022   • Cancer ([place] [address] ) 11/10/2022   • Glaucoma 11/10/2022   • Major depression, single episode 11/10/2022   • Osteoporosis 11/10/2022   • Marital conflict    • Recurrent major depressive disorder, in full remission ([place] [address] ) 06/15/2022   • Polymyalgia ([place] [address] ) 02/10/2022   • Major depressive disorder, recurrent, in partial remission ([place] [address] ) 2021   • Gambling disorder, moderate 2021   • Primary insomnia 2021   • Insomnia related to another mental disorder 2021   • Osteoarthritis of knee 11/10/2021   • PTSD (post-traumatic stress disorder) 2021   • Medical clearance for psychiatric admission 2021   • Neuropathy 03/10/2021   • Mild asthma 2020   • Leg weakness, bilateral 10/23/2020   • Epigastric abdominal pain 10/08/2020   • Syncope 2020   • History of vertigo 2020   • Dizziness and giddiness 2020   • Migraine without aura and without status migrainosus, not intractable 2020   • Adult hypothyroidism 2019   • Ambulatory dysfunction 2019   • History of cholecystectomy 2019   • Psychogenic weakness 2019   • Fibromyalgia 2019   • Generalized anxiety disorder 2019   • Herniated cervical disc 2019   • History of melanoma 2019   • BMI 27 0-27 9,adult 2019   • Lesion of lachelle 2019   • Right sided temporal headache 2019   • Cerv[facility] myel[facility] with [facility] rad[facility] ) 2019   • History of recent intraspinal surgery 02/14/2019   • Orthostatic hypotension 02/14/2019   • History of migraine headaches 02/14/2019   • Mixed dyslipidemia 02/14/2019   • Cervical stenosis of spinal canal 12/31/2018   • Cervical spondylosis without myelopathy 11/20/2018   • Gastroesophageal reflux disease with esophagitis without hemorrhage 06/04/2018   • Focal neurological deficit 03/03/2018   • Radiculopathy of lumbar region 02/28/2018   • 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[facility] ) 02/28/2018   • Osteopenia 03/27/2017   • Allergic rhinitis 08/04/2016   • Alcohol dependence in remission ([facility] ) 04/19/2016   • Arthropathy of multiple sites 09/04/2012   • Severe episode of recurrent major depressive disorder, without psychotic features ([place] [address] ) 09/04/2012   • Irritable bowel syndrome 09/04/2012   • Raynaud's syndrome without gangrene 09/04/2012      LOS (days): 6  Geometric Mean LOS (GMLOS) (days):   Days to GMLOS:     OBJECTIVE:    Risk of Unplanned Readmission Score: 13 47         Current admission status: Inpatient Psych  Referral Reason: Psych    Preferred Pharmacy:   [id number] [address], [address]  Phone: [phone] Fax: [phone]    Primary Care Provider: Damion Coleman MD    [facility]  Secondary Insurance: MEDICARE    ASSESSMENT:  Active Health Care Proxies    There are no active Health Care Proxies on file  DISCHARGE DETAILS:        CM met with patient to discuss discharge  Patient is eager to get home and feels ready  Patient discussed coping skills that she will do to keep her busy and to better communicate with her   On admission patient advised that her psychiatrist was retiring so we got her set up with a new provider to manage her medication   Patient requested therapy, CM put in a referral to [facility] for therapy, and patient will follow up with that since she was previously seeing a therapist through them  Called the patient's  to discuss discharge as well   He will be here to get her at 11:30 am      Aftercare:       [facility]risten Alcaraz in [place] on 12/29 @ 9 am with Erna Chiang MD

## 2022-12-02 NOTE — PLAN OF CARE
Problem: Ineffective Coping  Goal: Identifies ineffective coping skills  12/2/2022 1007 by Tono Rhoades RN  Outcome: Adequate for Discharge  12/2/2022 9633 by Tono Rhoades RN  Outcome: Adequate for Discharge  12/2/2022 1849 by Tono Rhoades RN  Outcome: Adequate for Discharge  Goal: Identifies healthy coping skills  12/2/2022 1007 by Tono Rhoades RN  Outcome: Adequate for Discharge  12/2/2022 0959 by Tono Rhoades RN  Outcome: Adequate for Discharge  12/2/2022 0955 by Tono Rhoades RN  Outcome: Adequate for Discharge  Goal: Demonstrates healthy coping skills  12/2/2022 1007 by Tono Rhoades RN  Outcome: Adequate for Discharge  12/2/2022 0959 by Tono Rhoades RN  Outcome: Adequate for Discharge  12/2/2022 0955 by Tono Rhoades RN  Outcome: Adequate for Discharge  Goal: Participates in unit activities  Description: Interventions:  - Provide therapeutic environment   - Provide required programming   - Redirect inappropriate behaviors   12/2/2022 1007 by Tono Rhoades RN  Outcome: Adequate for Discharge  12/2/2022 0959 by Tono Rhoades RN  Outcome: Adequate for Discharge  12/2/2022 0955 by Tono Rhoades RN  Outcome: Adequate for Discharge  Goal: Patient/Family participate in treatment and DC plans  Description: Interventions:  - Provide therapeutic environment  12/2/2022 1007 by Tono Rhoades RN  Outcome: Adequate for Discharge  12/2/2022 0959 by Tono Rhoades RN  Outcome: Adequate for Discharge  12/2/2022 0955 by Tono Rhaodes RN  Outcome: Adequate for Discharge  Goal: Patient/Family verbalizes awareness of resources  12/2/2022 1007 by Tono Rhoades RN  Outcome: Adequate for Discharge  12/2/2022 9331 by Tono Rhoades RN  Outcome: Adequate for Discharge  12/2/2022 0955 by Tono Rhoades RN  Outcome: Adequate for Discharge  Goal: Understands least restrictive measures  Description: Interventions:  - Utilize least restrictive behavior  12/2/2022 1007 by Tono Rhoades RN  Outcome: Adequate for Discharge  12/2/2022 0959 by Tono Rhoades RN  Outcome: Adequate for Discharge  12/2/2022 0955 by Evelyn Merino RN  Outcome: Adequate for Discharge  Goal: Free from restraint events  Description: - Utilize least restrictive measures   - Provide behavioral interventions   - Redirect inappropriate behaviors   12/2/2022 1007 by Evelyn Merino RN  Outcome: Adequate for Discharge  12/2/2022 0959 by Evelyn Merino RN  Outcome: Adequate for Discharge  12/2/2022 0955 by Evelyn Merino RN  Outcome: Adequate for Discharge     Problem: Depression  Goal: Treatment Goal: Demonstrate behavioral control of depressive symptoms, verbalize feelings of improved mood/affect, and adopt new coping skills prior to discharge  12/2/2022 1007 by Evelyn Merino RN  Outcome: Adequate for Discharge  12/2/2022 0959 by Evelyn Merino RN  Outcome: Adequate for Discharge  12/2/2022 0955 by Evelyn Merino RN  Outcome: Adequate for Discharge  Goal: Verbalize thoughts and feelings  Description: Interventions:  - Assess and re-assess patient's level of risk   - Engage patient in 1:1 interactions, daily, for a minimum of 15 minutes   - Encourage patient to express feelings, fears, frustrations, hopes   12/2/2022 1007 by Evelyn Merino RN  Outcome: Adequate for Discharge  12/2/2022 0959 by Evelyn Merino RN  Outcome: Adequate for Discharge  12/2/2022 0955 by Evelyn Merino RN  Outcome: Adequate for Discharge  Goal: Refrain from harming self  Description: Interventions:  - Monitor patient closely, per order   - Supervise medication ingestion, monitor effects and side effects   12/2/2022 1007 by Evelyn Merino RN  Outcome: Adequate for Discharge  12/2/2022 0959 by Evelyn Merino RN  Outcome: Adequate for Discharge  12/2/2022 0955 by Evelyn Merino RN  Outcome: Adequate for Discharge  Goal: Refrain from isolation  Description: Interventions:  - Develop a trusting relationship   - Encourage socialization   12/2/2022 1007 by Evelyn Merino RN  Outcome: Adequate for Discharge  12/2/2022 0959 by Evelyn Merino RN  Outcome: Adequate for Discharge  12/2/2022 0955 by Riky Murray RN  Outcome: Adequate for Discharge  Goal: Refrain from self-neglect  12/2/2022 1007 by Riky Murray RN  Outcome: Adequate for Discharge  12/2/2022 0149 by Riky Murray RN  Outcome: Adequate for Discharge  12/2/2022 9716 by Riky Murray RN  Outcome: Adequate for Discharge  Goal: Attend and participate in unit activities, including therapeutic, recreational, and educational groups  Description: Interventions:  - Provide therapeutic and educational activities daily, encourage attendance and participation, and document same in the medical record   12/2/2022 1007 by Riky Murray RN  Outcome: Adequate for Discharge  12/2/2022 0959 by Riky Murray RN  Outcome: Adequate for Discharge  12/2/2022 0955 by Riky Murray RN  Outcome: Adequate for Discharge  Goal: Complete daily ADLs, including personal hygiene independently, as able  Description: Interventions:  - Observe, teach, and assist patient with ADLS  -  Monitor and promote a balance of rest/activity, with adequate nutrition and elimination   12/2/2022 1007 by Riky Murray RN  Outcome: Adequate for Discharge  12/2/2022 0959 by Riky Murray RN  Outcome: Adequate for Discharge  12/2/2022 0955 by Riky Murray RN  Outcome: Adequate for Discharge     Problem: DISCHARGE PLANNING  Goal: Discharge to home or other facility with appropriate resources  Description: INTERVENTIONS:  - Identify barriers to discharge w/patient and caregiver  - Arrange for needed discharge resources and transportation as appropriate  - Identify discharge learning needs (meds, wound care, etc )  - Arrange for interpretive services to assist at discharge as needed  - Refer to Case Management Department for coordinating discharge planning if the patient needs post-hospital services based on physician/advanced practitioner order or complex needs related to functional status, cognitive ability, or social support system  12/2/2022 1007 by Riky Murray RN  Outcome: Adequate for Discharge  12/2/2022 0959 by Mery Pearson RN  Outcome: Adequate for Discharge  12/2/2022 6757 by Mery Pearson RN  Outcome: Adequate for Discharge

## 2022-12-05 NOTE — DISCHARGE SUMMARY
Psychiatric Discharge Summary    Medical Record Number: [id number]  Encounter: [id number]    Discharge diagnosis:  Mood disorder [facility])    Secondary diagnoses:  Medical Problems     Problem List     * (Principal) Mood disorder ([facility] )    Alcohol dependence in remission ([facility] ) (Chronic)    Allergic rhinitis (Chronic)    Arthropathy of multiple sites    Overview Signed 2/5/2018  2:38 PM by Magda Rasmussen MD     Procedure/Onset: 07/11/2006         Severe episode of recurrent major depressive disorder, without psychotic features ([facility] )    Overview Deleted 3/3/2018 12:42 PM by SHRAVAN Ridley            Irritable bowel syndrome (Chronic)    Overview Deleted 3/3/2018 12:43 PM by SHRAVAN Ridley            Raynaud's syndrome without gangrene    Overview Signed 2/5/2018  2:38 PM by Magda Rasmussen MD     Procedure/Onset: 01/24/2006         Osteopenia (Chronic)    Radiculopathy of lumbar region (Chronic)    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[facility] )    Focal neurological deficit    Gastroesophageal reflux disease with esophagitis without hemorrhage    Cervical myelopathy with cervical radiculopathy (HCC)    History of recent intraspinal surgery    Orthostatic hypotension    History of migraine headaches    Mixed dyslipidemia    Lesion of lachelle    Right sided temporal headache    Fibromyalgia    Generalized anxiety disorder    Herniated cervical disc    Overview Signed 4/11/2019 12:56 PM by Magda Rasmussen MD     Last Assessment & Plan:    This is a 59 y o  right-handed female s/p ACDF C3-4, C$-5 POD #2  -medical management per medicine team  -neuro checks per protocol  -PT/OT->rehab  -physiatry ordered-suggest rehab placement  -bilateral lower extremity dopplers-await report  -follow exam  -pain control  -dvt prophylaxis-heparin SQ         History of melanoma    Cervical stenosis of spinal canal    BMI 27 0-27 9,adult    Psychogenic weakness    History of cholecystectomy    Ambulatory dysfunction    Adult hypothyroidism    Dizziness and giddiness    Migraine without aura and without status migrainosus, not intractable    Syncope    History of vertigo    Epigastric abdominal pain    Leg weakness, bilateral    Mild asthma    Neuropathy    Medical clearance for psychiatric admission    PTSD (post-traumatic stress disorder) (Chronic)    Osteoarthritis of knee    Major depressive disorder, recurrent, in partial remission ([facility] )    Gambling disorder, moderate    Primary insomnia    Insomnia related to another mental disorder    Polymyalgia ([facility] )    Recurrent major depressive disorder, in full remission ([facility] )    Marital conflict    Anxiety and depression    Cancer ([facility] )    Cervical spondylosis without myelopathy    Glaucoma    Major depression, single episode    Osteoporosis    Acute metabolic encephalopathy    TIA (transient ischemic attack)          HPI:     71-year-old  woman who was admitted voluntarily for mental status change  She said that she became disoriented tearful and unable to care for self for brief period time without any precipitating event  Her  said that she was not herself and became frightened about the changes he had seen and recommended she come to the hospital   She was treated for metabolic encephalopathy in the medical unit and then seen by psychiatry who recommended inpatient care  Patient has ever some ongoing depression although she says she had been feeling well for quite some time in taking her Cymbalta which was being prescribed by her family doctor because she felt she did not need to see her psychiatrist any longer  Medically patient suffers from orthostatic hypotension and low blood pressure and has been on the midodrine and to raise her blood pressure although at the same time she is on a beta-blocker and she is on a rather high dose of the duloxetine 90 mg  Which can contribute to orthostasis          Brief Hospital Course:     After the patient was admitted to the psychiatric unit  the patient had several psychotropic medication adjustments made to help stabilize their mood  Following these medication changes, the patient started to stabilize  Finally on 12/5/2022, the patient was found to be stable for discharge  On the day of discharge the patient reported their symptoms as significantly improved  All psychiatric medications were being tolerated without side effect or adverse event  No suicidal or homicidal ideations were present  The patient expressed their readiness and willingness to be discharged and referral to outpatient treatment was made  The case was discussed with Dr Jazlyn Espinoza and he has deemed the patient stable for discharge  Patient's medications were reviewed and altered she was reduced under duloxetine to 60 mg she was taken off her beta-blocker and Neurontin was reduced to 900 mg a day which was being actually prescribed for a neuropathy  She felt well on these changes of medications her mood improved she was coping and participating in the activities on the unit  No sign of orthostasis or hypotension and tolerated the med changes well without incident  After several days on the unit she seems stable and is able to discharge to outpatient program mental health clinic  Condition on discharge:     Improved    Medications Upon Discharge:     No current facility-administered medications for this encounter      Current Outpatient Medications:   •  gabapentin (NEURONTIN) 300 mg capsule, Take 1 capsule (300 mg total) by mouth 3 (three) times a day, Disp: 90 capsule, Rfl: 0  •  midodrine (PROAMATINE) 10 MG tablet, Take 1 tablet (10 mg total) by mouth 3 (three) times a day, Disp: 270 tablet, Rfl: 0  •  acetaminophen (TYLENOL) 650 mg CR tablet, if needed, Disp: , Rfl:   •  albuterol (ProAir HFA) 90 mcg/act inhaler, Inhale 2 puffs every 6 (six) hours as needed for wheezing, Disp: 8 5 g, Rfl: 0  •  albuterol (ProAir HFA) 90 mcg/act inhaler, Inhale 2 puffs every 6 (six) hours as needed for wheezing, Disp: 18 g, Rfl: 2  •  atorvastatin (LIPITOR) 20 mg tablet, TAKE 1 TABLET BY MOUTH EVERY DAY, Disp: 90 tablet, Rfl: 2  •  calcium carbonate (OS-WILLY) 1250 (500 Ca) MG tablet, Take 1,250 mg by mouth, Disp: , Rfl:   •  [employer] Aspirin Adult Low Strength 81 MG EC tablet, CHEW AND SWALLOW 1 TABLET BY MOUTH ONCE DAILY, Disp: , Rfl:   •  DULoxetine (CYMBALTA) 60 mg delayed release capsule, Take 1 capsule (60 mg total) by mouth daily, Disp: 90 capsule, Rfl: 1  •  levothyroxine 50 mcg tablet, TAKE 1 TABLET BY MOUTH EVERY DAY, Disp: 90 tablet, Rfl: 0  •  Magnesium 400 MG TABS, Take by mouth daily, Disp: , Rfl:   •  pantoprazole (PROTONIX) 40 mg tablet, TAKE 1 TABLET BY MOUTH EVERY DAY, Disp: 30 tablet, Rfl: 0  •  QUEtiapine (SEROquel) 100 mg tablet, Take 1 tablet (100 mg total) by mouth daily at bedtime, Disp: 90 tablet, Rfl: 1    Radha Mcneil MD

## 2022-12-07 ENCOUNTER — OFFICE VISIT (OUTPATIENT)
Dept: NEUROLOGY | Facility: CLINIC | Age: 67
End: 2022-12-07

## 2022-12-07 VITALS
DIASTOLIC BLOOD PRESSURE: 91 MMHG | HEIGHT: 65 IN | BODY MASS INDEX: 27.32 KG/M2 | OXYGEN SATURATION: 98 % | HEART RATE: 82 BPM | SYSTOLIC BLOOD PRESSURE: 123 MMHG | TEMPERATURE: 95.4 F | WEIGHT: 164 LBS

## 2022-12-07 DIAGNOSIS — G95.9 CERVICAL MYELOPATHY WITH CERVICAL RADICULOPATHY (HCC): Primary | ICD-10-CM

## 2022-12-07 DIAGNOSIS — F32.A DEPRESSION: ICD-10-CM

## 2022-12-07 DIAGNOSIS — G62.9 PERIPHERAL NEUROPATHY: ICD-10-CM

## 2022-12-07 DIAGNOSIS — M48.02 CERVICAL SPINAL STENOSIS: ICD-10-CM

## 2022-12-07 DIAGNOSIS — M54.12 CERVICAL MYELOPATHY WITH CERVICAL RADICULOPATHY (HCC): Primary | ICD-10-CM

## 2022-12-07 RX ORDER — GABAPENTIN 300 MG/1
300 CAPSULE ORAL 3 TIMES DAILY
Qty: 90 CAPSULE | Refills: 5 | Status: SHIPPED | OUTPATIENT
Start: 2022-12-07

## 2022-12-07 NOTE — PATIENT INSTRUCTIONS
Continue with Gabapentin 300mg 3x a day  Continue with Cymbalta 60mg daily per Psychiatry  Continue with Midodrine 10mg 3x a day  Take BP in afternoon with headaches to see if midodrine is too high at this time  May need to have this adjusted with reduction of medications and stoppage of the metoprolol    Continue with good oral hydration  Continue follow up and EMGs with Dr Do Stoll in NeuroSurgery  Follow up with neurology office in 5 months or sooner if needed

## 2022-12-07 NOTE — PROGRESS NOTES
Patient ID: Peter Stokes is a 78 y o  female  Assessment/Plan:     Diagnoses and all orders for this visit:    Cervical myelopathy with cervical radiculopathy (HCC)    Depression  -     gabapentin (NEURONTIN) 300 mg capsule; Take 1 capsule (300 mg total) by mouth 3 (three) times a day    Cervical spinal stenosis    Peripheral neuropathy       Continue with Gabapentin 300mg 3x a day  Continue with Cymbalta 60mg daily per Psychiatry  Continue with Midodrine 10mg 3x a day  Take BP in afternoon with headaches to see if midodrine is too high at this time  May need to have this adjusted with reduction of medications and stoppage of the metoprolol  Continue with good oral hydration  Continue follow up and EMGs with Dr Earlene Vasquez in NeuroSurgery  Follow up with neurology office in 5 months or sooner if needed    Subjective/HPI:  Peter Stokes is a 72yo female who follows in the outpatient neurology office for medical management of neuropathy, fibromyalgia, cervical radiculopathy and dizziness  Dizziness:   Patient had been noted to be orthostatically hypotensive, she was started on midodrine which was titrated up to 10 mg 3 times daily  Patient had reported since making this change she has had less episodic dizziness  Patient also had a loop recorder placed for continued cardiac monitoring  Cervical/lumbar radiculopathy  Patient had reported ongoing issues with lumbar radiculopathy, she is on gabapentin 600 mg twice daily  MRI of the lumbar spine showed asymmetric L4-5 foraminal stenosis  She underwent anterior fusion of the C3-4 and C4-5 and had persistent left C4-5 and C5-6 foraminal stenosis indicating possible radiculitis for which she was to see her surgeon after her last office appointment with neurology in June 2022    Neuropathy:  Patient had an EMG of the lower extremities in March 2021, revealed left-sided superficial peroneal neuropathy otherwise was normal   Patient has actively been trying to lose weight and walking more frequently  Patient has been on gabapentin 600 mg twice daily  She was on Cymbalta for depression and fibromyalgia  She was to address anxiety with her psychiatrist     Patient reports back to the neurology office today  She had reported having a rough couple of weeks  Stated recent hospitalization in psychiatric facility  Patient stated that her  reported she was doing abnormal behaviors which she does not recall  Stated that he had a taken to the ER for psychiatric evaluation  Patient was then admitted to a psychiatric facility for 3 to 4 days before she was discharged  Patient was discharged home on gabapentin 300 mg 3 times a day, Cymbalta 60 mg daily, Lipitor 20 mg daily and her midodrine still 10 mg 3 times a day  Patient was taken off of her beta-blocker as well  Patient is also on levothyroxine, magnesium, pantoprazole and Seroquel 100 mg at bedtime  Patient also reported a hospitalization prior to that 1 where she had an increased rushing noise into her head with electrical shock sensations into her hands  She was taken to the emergency room at [facility]   She was a stroke alert at that time and given tPA  She reports that she was having some difficulties finding her words which caused him some concerns  Patient had an MRI of the brain which she stated showed no abnormalities, attempted to pull up the report however this is not able to be loaded into the system  Patient also reported a C-spine study that was done which shows continued issues with stenosis again this result has not yet been able to be loaded into the system  Patient stated she has since seen her neurosurgeon, they are following up with EMGs of the upper and lower extremities  Patient stated she had no neurological changes or issues to warrant stroke treatment however because of her word finding issues they had to continue to rule this out    Since being out of her last hospitalization having her multiple medication changes, patient feels as though she is doing relatively well  States that she feels good with the decrease in medications and has not had any increase in her pain that she has noticed  With regards to positional lightheadedness, patient continues on her midodrine 10 mg 3 times daily  This was not adjusted during her hospital stay  Patient denies having any issues with lightheadedness or dizziness at this time, has been tolerating her medication well  Patient does incidentally note having an increase in headache activity over the past week  She does not regularly have headaches and has not had any issues with migraines recently  Patient states that these headaches are mild but they tend to be in the middle of the afternoon  Advised patient to check her blood pressure when her headaches come on, suspect possibly hypertensive  Patient was taken off of her beta-blocker however her midodrine has not been adjusted, patient's resting pressures may be too high after having midodrine  Patient will get a blood pressure cuff that she is able to use at home, and monitor her blood pressures during these episodes  Patient continues to follow-up with Dr Kwame Dempsey and neurosurgery through [place] [id number]  They have ordered an EMG for her in upper and lower extremities to rule out components of ulnar and median neuropathy rather than continued cervical radiculopathy  In the meantime, patient is currently doing well on her medication regimen  She is tolerating the 300 mg of gabapentin 3 times a day and has not noticed any increase in her neuropathy pains  She will remain on this  We did discuss not increasing this dosage over time  Patient has since had psychiatric care and has been doing much better since the reduction of her doses    She does endorse having    Currently patient denies any headache, dizziness, lightheadedness, speech or swallowing changes, vision changes imbalances or falls  She does endorse having bilateral upper and lower neuropathy with paresthesia however her pain is well controlled under her current regimen  No medication changes will be made at this time  Patient to continue to follow-up with her neurosurgery team and will follow-up with outpatient neurology office in 5 months or sooner if needed      The following portions of the patient's history were reviewed and updated as appropriate: allergies, current medications, past family history, past medical history, past social history, past surgical history and problem list         Past Medical History:   Diagnosis Date   • Abdominal adhesions    • Anesthesia complication     difficult to wake up   • Anxiety    • Arthritis    • [facility]     melanoma   • Depression    • Depression    • Disease of thyroid gland    • Fibromyalgia    • Fibromyalgia, primary    • Fractures 2006   • GERD (gastroesophageal reflux disease)    • History of cholecystectomy 09/07/2019   • Hyperlipidemia    • Irregular heart beat     can be tachy; also has orthoststic hypotension   • Lazy eye     resolved: 3/27/17   • Medical clearance for psychiatric admission 07/13/2021   • Melanoma ([facility] ) 2010   • Osteoarthritis 07/2018   • Osteopenia     with joint pain-elevated DEV   • Psychiatric disorder    • Shortness of breath     assoc with tachycardia   • Stomach disorder 1995   • Tinnitus    • Wears glasses     for reading       Past Surgical History:   Procedure Laterality Date   • ABDOMINAL SURGERY      lysis of adhesions x 2   • APPENDECTOMY     • CERVICAL FUSION      May 5,2021 and Jan 01,2020   • CHOLECYSTECTOMY      open   • COLONOSCOPY     • DILATION AND CURETTAGE OF UTERUS     • FOOT SURGERY  05/2017   • NECK SURGERY  01/2019 5/2021   • NEUROMA EXCISION Right 05/26/2017    Procedure: EXCISION MASS / Dareen Cover;  Surgeon: Georgena Saint, DPM;  Location: [facility] MAIN OR;  Service:    • PARS PLANA VITRECTOMY W/ REPAIR OF MACULAR HOLE  12/23/2020   • NM XCAPSL CTRC RMVL INSJ IO LENS PROSTH W/O ECP Left 12/06/2021    Procedure: EXTRACTION EXTRACAPSULAR CATARACT PHACO INTRAOCULAR LENS (IOL);   Surgeon: Garret Lizama MD;  [facility] MAIN OR;  Service: Ophthalmology   • RIGHT OOPHORECTOMY     • WISDOM TOOTH EXTRACTION      x4       Social History     Socioeconomic History   • Marital status: /Civil Union     Spouse name: None   • Number of children: None   • Years of education: None   • Highest education level: None   Occupational History   • None   Tobacco Use   • Smoking status: Never   • Smokeless tobacco: Never   Vaping Use   • Vaping Use: Never used   Substance and Sexual Activity   • Alcohol use: Not Currently   • Drug use: No   • Sexual activity: Not Currently   Other Topics Concern   • None   Social History Narrative   • None     Social Determinants of Health     Financial Resource Strain: Not on file   Food Insecurity: Not on file   Transportation Needs: Not on file   Physical Activity: Not on file   Stress: Not on file   Social Connections: Not on file   Intimate Partner Violence: Not on file   Housing Stability: Not on file       Family History   Problem Relation Age of Onset   • Heart disease Mother    • Stroke Mother 58   • COPD Mother    • Arthritis Mother    • Hypertension Father    • Kidney disease Brother         kidney transplant   • Multiple sclerosis Sister    • No Known Problems Maternal Aunt    • No Known Problems Maternal Uncle    • No Known Problems Paternal Aunt    • No Known Problems Paternal Uncle    • No Known Problems Maternal Grandmother    • No Known Problems Maternal Grandfather    • No Known Problems Paternal Grandmother    • No Known Problems Paternal Grandfather    • Dislocations Sister    • Neurological problems Sister    • Scoliosis Sister    • ADD / ADHD Neg Hx    • Anesthesia problems Neg Hx    • Cancer Neg Hx    • Clotting disorder Neg Hx    • Collagen disease Neg Hx    • Diabetes Neg Hx    • Dislocations Neg Hx    • Learning disabilities Neg Hx    • Neurological problems Neg Hx    • Osteoporosis Neg Hx    • Rheumatologic disease Neg Hx    • Scoliosis Neg Hx    • Vascular Disease Neg Hx          Current Outpatient Medications:   •  acetaminophen (TYLENOL) 650 mg CR tablet, if needed, Disp: , Rfl:   •  albuterol (ProAir HFA) 90 mcg/act inhaler, Inhale 2 puffs every 6 (six) hours as needed for wheezing, Disp: 8 5 g, Rfl: 0  •  atorvastatin (LIPITOR) 20 mg tablet, TAKE 1 TABLET BY MOUTH EVERY DAY, Disp: 90 tablet, Rfl: 2  •  calcium carbonate (OS-WILLY) 1250 (500 Ca) MG tablet, Take 1,250 mg by mouth, Disp: , Rfl:   •  [employer] Aspirin Adult Low Strength 81 MG EC tablet, CHEW AND SWALLOW 1 TABLET BY MOUTH ONCE DAILY, Disp: , Rfl:   •  DULoxetine (CYMBALTA) 60 mg delayed release capsule, Take 1 capsule (60 mg total) by mouth daily, Disp: 90 capsule, Rfl: 1  •  gabapentin (NEURONTIN) 300 mg capsule, Take 1 capsule (300 mg total) by mouth 3 (three) times a day, Disp: 90 capsule, Rfl: 5  •  levothyroxine 50 mcg tablet, TAKE 1 TABLET BY MOUTH EVERY DAY, Disp: 90 tablet, Rfl: 0  •  Magnesium 400 MG TABS, Take by mouth daily, Disp: , Rfl:   •  midodrine (PROAMATINE) 10 MG tablet, Take 1 tablet (10 mg total) by mouth 3 (three) times a day, Disp: 270 tablet, Rfl: 0  •  pantoprazole (PROTONIX) 40 mg tablet, TAKE 1 TABLET BY MOUTH EVERY DAY, Disp: 30 tablet, Rfl: 0  •  QUEtiapine (SEROquel) 100 mg tablet, Take 1 tablet (100 mg total) by mouth daily at bedtime, Disp: 90 tablet, Rfl: 1  •  albuterol (ProAir HFA) 90 mcg/act inhaler, Inhale 2 puffs every 6 (six) hours as needed for wheezing (Patient not taking: Reported on 12/7/2022), Disp: 18 g, Rfl: 2    Allergies   Allergen Reactions   • Meperidine Lightheadedness and Other (See Comments)   • Sulfa Antibiotics Hives        Blood pressure 123/91, pulse 82, temperature (!) 95 4 °F (35 2 °C), temperature source Tympanic, height 5' 5" (1 651 m), weight 74 4 kg (164 lb), SpO2 98 %         Objective:    Blood pressure 123/91, pulse 82, temperature (!) 95 4 °F (35 2 °C), temperature source Tympanic, height 5' 5" (1 651 m), weight 74 4 kg (164 lb), SpO2 98 %  Physical Exam  Vitals reviewed  Constitutional:       Appearance: Normal appearance  She is well-developed  HENT:      Head: Normocephalic  Nose: Nose normal       Mouth/Throat:      Mouth: Mucous membranes are moist    Eyes:      General: Lids are normal       Extraocular Movements: Extraocular movements intact  Pupils: Pupils are equal, round, and reactive to light  Cardiovascular:      Rate and Rhythm: Normal rate  Pulmonary:      Effort: Pulmonary effort is normal    Abdominal:      Palpations: Abdomen is soft  Musculoskeletal:      Cervical back: Normal range of motion  Skin:     General: Skin is warm and dry  Neurological:      Mental Status: She is alert  Coordination: Romberg sign negative  Deep Tendon Reflexes: Reflexes are normal and symmetric  Psychiatric:         Attention and Perception: Attention and perception normal          Mood and Affect: Mood and affect normal          Speech: Speech normal          Behavior: Behavior normal  Behavior is cooperative  Thought Content: Thought content normal          Cognition and Memory: Cognition normal  Memory is impaired  Judgment: Judgment normal          Neurological Exam  Mental Status  Alert  Oriented to person, place, time and situation  Recent and remote memory are intact  Speech is normal  Language is fluent with no aphasia  Attention and concentration are normal  Fund of knowledge is appropriate for level of education  Cranial Nerves  CN II: Visual acuity is normal  Visual fields full to confrontation  CN III, IV, VI: Extraocular movements intact bilaterally  Normal lids and orbits bilaterally  Pupils equal round and reactive to light bilaterally    CN V: Facial sensation is normal   CN VII: Full and symmetric facial movement  CN VIII: Hearing is normal   CN IX, X: Palate elevates symmetrically  Normal gag reflex  CN XI: Shoulder shrug strength is normal   CN XII: Tongue midline without atrophy or fasciculations  Motor  Normal muscle bulk throughout  Normal muscle tone  No abnormal involuntary movements  Strength is 5/5 in all four extremities except as noted  No pronator drift  Right                     Left   Iliopsoas                               4+                          4+   Quadriceps                           4+                          4+   Hamstring                             4+                          4+    Sensory  Light touch is normal in upper and lower extremities  Temperature is normal in upper and lower extremities  Vibration is normal in upper and lower extremities  Proprioception is normal in upper and lower extremities  Reflexes  Deep tendon reflexes are 2+ and symmetric in all four extremities  Right pathological reflexes: Malik's absent  Left pathological reflexes: Malik's absent  Coordination  Right: Finger-to-nose normal  Rapid alternating movement normal  Heel-to-shin normal Left: Finger-to-nose normal  Rapid alternating movement normal  Heel-to-shin normal     Gait  Casual gait: Wide stance  Reduced stride length  Antalgic gait  Reduced right arm swing  Reduced left arm swing  Romberg is absent  Unable to rise from chair without using arms  Mildly antalgic gait, walks without assistive device  ROS:    Review of Systems   Constitutional: Negative  Negative for appetite change and fever  HENT: Positive for tinnitus and trouble swallowing  Negative for hearing loss, sinus pressure and voice change  Eyes: Negative  Negative for photophobia, pain and visual disturbance  Respiratory: Negative  Negative for shortness of breath  Cardiovascular: Negative  Negative for palpitations  Gastrointestinal: Negative    Negative for nausea and vomiting  Endocrine: Negative  Negative for cold intolerance  Genitourinary: Negative  Negative for dysuria, frequency and urgency  Musculoskeletal: Positive for neck pain  Negative for gait problem and myalgias  Skin: Negative  Negative for rash  Allergic/Immunologic: Negative  Neurological: Positive for syncope (Thanksgiving morning) and numbness  Negative for dizziness, tremors, seizures, facial asymmetry, speech difficulty, weakness, light-headedness and headaches  Hematological: Negative  Does not bruise/bleed easily  Psychiatric/Behavioral: Negative  Negative for confusion, hallucinations and sleep disturbance  ROS reviewed and discussed with patient

## 2022-12-09 ENCOUNTER — OFFICE VISIT (OUTPATIENT)
Dept: GASTROENTEROLOGY | Facility: CLINIC | Age: 67
End: 2022-12-09

## 2022-12-09 VITALS
BODY MASS INDEX: 27.32 KG/M2 | SYSTOLIC BLOOD PRESSURE: 155 MMHG | HEART RATE: 79 BPM | DIASTOLIC BLOOD PRESSURE: 99 MMHG | HEIGHT: 65 IN | WEIGHT: 164 LBS

## 2022-12-09 DIAGNOSIS — R13.14 PHARYNGOESOPHAGEAL DYSPHAGIA: ICD-10-CM

## 2022-12-09 DIAGNOSIS — K21.9 GASTROESOPHAGEAL REFLUX DISEASE, UNSPECIFIED WHETHER ESOPHAGITIS PRESENT: Primary | ICD-10-CM

## 2022-12-09 DIAGNOSIS — Z86.010 HISTORY OF COLON POLYPS: ICD-10-CM

## 2022-12-09 DIAGNOSIS — R14.3 FLATULENCE: ICD-10-CM

## 2022-12-09 RX ORDER — ESOMEPRAZOLE MAGNESIUM 40 MG/1
40 CAPSULE, DELAYED RELEASE ORAL
Qty: 90 CAPSULE | Refills: 5 | Status: SHIPPED | OUTPATIENT
Start: 2022-12-09

## 2022-12-09 NOTE — PROGRESS NOTES
[facility] - Outpatient Follow-up Note  Marshal Kiran 78 y o  female MRN: [id number]  Encounter: [id number]          ASSESSMENT AND PLAN:      1  Gastroesophageal reflux disease, unspecified whether esophagitis present  -     esomeprazole (NexIUM) 40 MG capsule; Take 1 capsule (40 mg total) by mouth daily before breakfast    2  Pharyngoesophageal dysphagia  -     esomeprazole (NexIUM) 40 MG capsule; Take 1 capsule (40 mg total) by mouth daily before breakfast    3  History of colon polyps    4  Flatulence    History of chronic acid reflux, occasional sensation of food sticking in the lower throat upper chest area history of cervical disc surgeries, drinks couple of coffees a day otherwise diet okay, antireflux measures with diet discussed, minimize caffeine  Will switch from pantoprazole to Nexium and monitor response  History of dysphagia which is stable, she wants to hold off EGD for now ,  If no improvement with current regimen she will call to schedule  Flatulence, diet discussed, avoid gassy foods, take probiotic, follow-up with us as needed  ______________________________________________________________________    SUBJECTIVE:      Patient came in for evaluation of her symptoms of acid reflux heartburn indigestion and occasional difficulty swallowing food sticks in the upper chest, she has had some cervical disc surgeries as well  She has been taking pantoprazole for quite some time, she thinks is not much effective, she also complains of some more flatulence, sometimes malodorous, denies any choking spells bronchospasms appetite is fair weight stable denies any GI bleeding melena hematochezia chest pain shortness of breath fever chills rash, bowel sometimes irregular  Diet medications more than 10 systems were reviewed  REVIEW OF SYSTEMS IS OTHERWISE NEGATIVE        Historical Information   Past Medical History:   Diagnosis Date   • Abdominal adhesions    • Anesthesia complication     difficult to wake up   • Anxiety    • Arthritis    • Cancer [facility])     melanoma   • Colon polyp    • Depression    • Depression    • Disease of thyroid gland    • Fibromyalgia    • Fibromyalgia, primary    • Fractures 2006   • GERD (gastroesophageal reflux disease)    • History of cholecystectomy 09/07/2019   • Hyperlipidemia    • Irregular heart beat     can be tachy; also has orthoststic hypotension   • Lazy eye     resolved: 3/27/17   • Medical clearance for psychiatric admission 07/13/2021   • Melanoma ([address] ) 2010   • Osteoarthritis 07/2018   • Osteopenia     with joint pain-elevated DEV   • Psychiatric disorder    • Shortness of breath     assoc with tachycardia   • Stomach disorder 1995   • Tinnitus    • Wears glasses     for reading     Past Surgical History:   Procedure Laterality Date   • ABDOMINAL SURGERY      lysis of adhesions x 2   • APPENDECTOMY     • CERVICAL FUSION      May 5,2021 and Jan 01,2020   • CHOLECYSTECTOMY      open   • COLONOSCOPY     • DILATION AND CURETTAGE OF UTERUS     • FOOT SURGERY  05/2017   • NECK SURGERY  01/2019 5/2021   • NEUROMA EXCISION Right 05/26/2017    Procedure: EXCISION MASS / FIBROMA FOOT;  Surgeon: Walter Kenney DPM;  Location: [address] [place] [address];  Service:    • PARS PLANA VITRECTOMY W/ REPAIR OF MACULAR HOLE  12/23/2020   • WV XCAPSL CTRC RMVL INSJ IO LENS PROSTH W/O ECP Left 12/06/2021    Procedure: EXTRACTION EXTRACAPSULAR CATARACT PHACO INTRAOCULAR LENS (IOL);   Surgeon: Myla Villatoro MD;  Location: [facility] MAIN OR;  Service: Ophthalmology   • RIGHT OOPHORECTOMY     • WISDOM TOOTH EXTRACTION      x4     Social History   Social History     Substance and Sexual Activity   Alcohol Use Not Currently     Social History     Substance and Sexual Activity   Drug Use Never     Social History     Tobacco Use   Smoking Status Never   Smokeless Tobacco Never     Family History   Problem Relation Age of Onset   • Heart disease Mother    • Stroke Mother 58   • COPD Mother    • Arthritis Mother    • Hypertension Father    • Kidney disease Brother         kidney transplant   • Multiple sclerosis Sister    • No Known Problems Maternal Aunt    • No Known Problems Maternal Uncle    • No Known Problems Paternal Aunt    • No Known Problems Paternal Uncle    • No Known Problems Maternal Grandmother    • No Known Problems Maternal Grandfather    • No Known Problems Paternal Grandmother    • No Known Problems Paternal Grandfather    • Dislocations Sister    • Neurological problems Sister    • Scoliosis Sister    • ADD / ADHD Neg Hx    • Anesthesia problems Neg Hx    • Cancer Neg Hx    • Clotting disorder Neg Hx    • Collagen disease Neg Hx    • Diabetes Neg Hx    • Dislocations Neg Hx    • Learning disabilities Neg Hx    • Neurological problems Neg Hx    • Osteoporosis Neg Hx    • Rheumatologic disease Neg Hx    • Scoliosis Neg Hx    • Vascular Disease Neg Hx        Meds/Allergies       Current Outpatient Medications:   •  acetaminophen (TYLENOL) 650 mg CR tablet  •  albuterol (ProAir HFA) 90 mcg/act inhaler  •  atorvastatin (LIPITOR) 20 mg tablet  •  calcium carbonate (OS-WILLY) 1250 (500 Ca) MG tablet  •  [employer] Aspirin Adult Low Strength 81 MG EC tablet  •  DULoxetine (CYMBALTA) 60 mg delayed release capsule  •  esomeprazole (NexIUM) 40 MG capsule  •  gabapentin (NEURONTIN) 300 mg capsule  •  levothyroxine 50 mcg tablet  •  Magnesium 400 MG TABS  •  midodrine (PROAMATINE) 10 MG tablet  •  pantoprazole (PROTONIX) 40 mg tablet  •  QUEtiapine (SEROquel) 100 mg tablet  •  albuterol (ProAir HFA) 90 mcg/act inhaler    Allergies   Allergen Reactions   • Meperidine Lightheadedness and Other (See Comments)   • Sulfa Antibiotics Hives           Objective     Blood pressure 155/99, pulse 79, height 5' 5" (1 651 m), weight 74 4 kg (164 lb)  Body mass index is 27 29 kg/m²  PHYSICAL EXAM:      General Appearance:   Alert, cooperative, no distress   HEENT:   Normocephalic, atraumatic, anicteric  Neck:  Supple, symmetrical, trachea midline   Lungs:   Clear to auscultation bilaterally; no rales, rhonchi or wheezing; respirations unlabored    Heart[de-identified]   Regular rate and rhythm; no murmur  Abdomen:   Soft, non-tender, non-distended; normal bowel sounds; no masses, no organomegaly    Genitalia:   Deferred    Rectal:   Deferred    Extremities:  No cyanosis, clubbing or edema    Skin:  No jaundice, rashes, or lesions    Lymph nodes:  No palpable cervical lymphadenopathy        Lab Results:   No visits with results within 1 Day(s) from this visit     Latest known visit with results is:   Admission on 11/24/2022, Discharged on 11/26/2022   Component Date Value   • POC Glucose 11/24/2022 135    • WBC 11/24/2022 5 50    • RBC 11/24/2022 4 65    • Hemoglobin 11/24/2022 12 7    • Hematocrit 11/24/2022 40 5    • MCV 11/24/2022 87    • MCH 11/24/2022 27 3    • MCHC 11/24/2022 31 4    • RDW 11/24/2022 15 3 (H)    • MPV 11/24/2022 10 1    • Platelets 63/18/2755 225    • nRBC 11/24/2022 0    • Neutrophils Relative 11/24/2022 65    • Immat GRANS % 11/24/2022 0    • Lymphocytes Relative 11/24/2022 24    • Monocytes Relative 11/24/2022 9    • Eosinophils Relative 11/24/2022 2    • Basophils Relative 11/24/2022 0    • Neutrophils Absolute 11/24/2022 3 52    • Immature Grans Absolute 11/24/2022 0 02    • Lymphocytes Absolute 11/24/2022 1 34    • Monocytes Absolute 11/24/2022 0 48    • Eosinophils Absolute 11/24/2022 0 12    • Basophils Absolute 11/24/2022 0 02    • Sodium 11/24/2022 145    • Potassium 11/24/2022 4 3    • Chloride 11/24/2022 109 (H)    • CO2 11/24/2022 29    • ANION GAP 11/24/2022 7    • BUN 11/24/2022 20    • Creatinine 11/24/2022 0 89    • Glucose 11/24/2022 144 (H)    • Calcium 11/24/2022 8 8    • Corrected Calcium 11/24/2022 9 3    • AST 11/24/2022 21    • ALT 11/24/2022 47    • Alkaline Phosphatase 11/24/2022 72    • Total Protein 11/24/2022 6 4    • Albumin 11/24/2022 3 4 (L)    • Total Bilirubin 11/24/2022 0 32    • eGFR 11/24/2022 67    • Ethanol Lvl 55/62/7678 <3    • Salicylate Lvl 60/08/3680 <3 (L)    • Acetaminophen Level 11/24/2022 <2 0 (L)    • TSH 3RD GENERATON 11/24/2022 1 983    • Amph/Meth UR 11/24/2022 Negative    • Barbiturate Ur 11/24/2022 Negative    • Benzodiazepine Urine 11/24/2022 Negative    • Cocaine Urine 11/24/2022 Negative    • Methadone Urine 11/24/2022 Negative    • Opiate Urine 11/24/2022 Negative    • PCP Ur 11/24/2022 Negative    • THC Urine 11/24/2022 Negative    • Oxycodone Urine 11/24/2022 Negative    • SARS-CoV-2 11/24/2022 Negative    • INFLUENZA A PCR 11/24/2022 Negative    • INFLUENZA B PCR 11/24/2022 Negative    • RSV PCR 11/24/2022 Negative    • Color, UA 11/24/2022 Yellow    • Clarity, UA 11/24/2022 Clear    • Specific Gravity, UA 11/24/2022 <=1 005    • pH, UA 11/24/2022 7 5    • Leukocytes, UA 11/24/2022 Negative    • Nitrite, UA 11/24/2022 Negative    • Protein, UA 11/24/2022 Negative    • Glucose, UA 11/24/2022 Negative    • Ketones, UA 11/24/2022 Negative    • Urobilinogen, UA 11/24/2022 0 2    • Bilirubin, UA 11/24/2022 Negative    • Occult Blood, UA 11/24/2022 Negative    • Vit D, 25-Hydroxy 11/24/2022 38 1    • Vitamin B-12 11/24/2022 659    • Sodium 11/25/2022 139    • Potassium 11/25/2022 4 4    • Chloride 11/25/2022 107    • CO2 11/25/2022 28    • ANION GAP 11/25/2022 4    • BUN 11/25/2022 17    • Creatinine 11/25/2022 0 75    • Glucose 11/25/2022 109    • Glucose, Fasting 11/25/2022 109 (H)    • Calcium 11/25/2022 8 7    • eGFR 11/25/2022 83    • WBC 11/25/2022 7 25    • RBC 11/25/2022 4 84    • Hemoglobin 11/25/2022 13 2    • Hematocrit 11/25/2022 41 8    • MCV 11/25/2022 86    • MCH 11/25/2022 27 3    • MCHC 11/25/2022 31 6    • RDW 11/25/2022 15 4 (H)    • Platelets 32/51/5854 225    • MPV 11/25/2022 10 1    • TSH 3RD GENERATON 11/25/2022 3 629    • Magnesium 11/25/2022 2 2    • Phosphorus 11/25/2022 3 5    • Cholesterol 11/25/2022 147    • Triglycerides 11/25/2022 116    • HDL, Direct 11/25/2022 61    • LDL Calculated 11/25/2022 63    • Ventricular Rate 11/24/2022 64    • Atrial Rate 11/24/2022 64    • VA Interval 11/24/2022 144    • QRSD Interval 11/24/2022 82    • QT Interval 11/24/2022 428    • QTC Interval 11/24/2022 441    • QRS Axis 11/24/2022 -14    • T Wave Axis 11/24/2022 17    • SARS-CoV-2 11/26/2022 Negative    • Sodium 11/26/2022 143    • Potassium 11/26/2022 4 3    • Chloride 11/26/2022 109 (H)    • CO2 11/26/2022 25    • ANION GAP 11/26/2022 9    • BUN 11/26/2022 11    • Creatinine 11/26/2022 0 81    • Glucose 11/26/2022 95    • Calcium 11/26/2022 8 9    • eGFR 11/26/2022 75    • Magnesium 11/26/2022 2 0    • Procalcitonin 11/26/2022 0 06          Radiology Results:   CTA head and neck w wo contrast    Result Date: 11/24/2022  Narrative: CTA NECK AND BRAIN WITH AND WITHOUT CONTRAST INDICATION: r/o stroke, bleed COMPARISON:   None  TECHNIQUE:  Routine CT imaging of the Brain without contrast   Post contrast imaging was performed after administration of iodinated contrast through the neck and brain  Post contrast axial 0 625 mm images timed to opacify the arterial system  3D rendering was performed on an independent workstation  MIP reconstructions performed  Coronal reconstructions were performed of the noncontrast portion of the brain  Radiation dose length product (DLP) for this visit:  1178 16 mGy-cm   This examination, like all CT scans performed in the [facility], was performed utilizing techniques to minimize radiation dose exposure, including the use of iterative reconstruction and automated exposure control  IV Contrast:  85 mL of iohexol (OMNIPAQUE)  IMAGE QUALITY:   Diagnostic FINDINGS: NONCONTRAST BRAIN PARENCHYMA:  No intracranial mass, mass effect or midline shift  No CT signs of acute infarction  No acute parenchymal hemorrhage  VENTRICLES AND EXTRA-AXIAL SPACES:  Normal for the patient's age   VISUALIZED ORBITS AND PARANASAL SINUSES:  Unremarkable  CERVICAL VASCULATURE AORTIC ARCH AND GREAT VESSELS:  Normal aortic arch and great vessel origins  Normal visualized subclavian vessels  RIGHT VERTEBRAL ARTERY CERVICAL SEGMENT:  The right vertebral artery occludes just beyond its origin  This is a new finding when compared to the prior CTA from October 20, 2020  A tiny caliber vertebral artery is identified within the right right foramen transversarium which is significantly decreased in size from the prior study  The vessel is continuous to the vertebrobasilar junction  Findings 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 and is seen dating back to  a prior CTA of the chest dated July 29, 2021  LEFT VERTEBRAL ARTERY CERVICAL SEGMENT:  Normal origin  The vessel is normal in caliber throughout the neck  RIGHT EXTRACRANIAL CAROTID SEGMENT:  Normal caliber common carotid artery  Normal bifurcation and cervical internal carotid artery  No stenosis or dissection  LEFT EXTRACRANIAL CAROTID SEGMENT:  Normal caliber common carotid artery  Normal bifurcation and cervical internal carotid artery  No stenosis or dissection  NASCET criteria was used to determine the degree of internal carotid artery diameter stenosis  INTRACRANIAL VASCULATURE INTERNAL CAROTID ARTERIES:  Normal enhancement of the intracranial portions of the internal carotid arteries  Normal ophthalmic artery origins  Normal ICA terminus  ANTERIOR CIRCULATION:  Symmetric A1 segments and anterior cerebral arteries with normal enhancement  Normal anterior communicating artery  MIDDLE CEREBRAL ARTERY CIRCULATION:  M1 segment and middle cerebral artery branches demonstrate normal enhancement bilaterally  DISTAL VERTEBRAL ARTERIES:  Normal distal vertebral arteries  Posterior inferior cerebellar artery origins are normal  Normal vertebral basilar junction  BASILAR ARTERY:  Basilar artery is normal in caliber    Normal superior cerebellar arteries  POSTERIOR CEREBRAL ARTERIES: Both posterior cerebral arteries arises from the basilar tip  Both arteries demonstrate normal enhancement  Patent right posterior indicating artery  VENOUS STRUCTURES:  Normal  NON VASCULAR ANATOMY BONY STRUCTURES:  Status post cervical fusion C3-C6  SOFT TISSUES OF THE NECK:  Unremarkable  THORACIC INLET:  Normal      Impression: No acute intracranial abnormality  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 which is present dating back to at least July 29, 2021  Vessel reconstitutes as a small caliber right vertebral artery is patent in the neck to the vertebrobasilar junction  No significant carotid stenosis  No focal intracranial stenosis or aneurysm  Workstation performed: [id number]     XR chest 1 view portable    Result Date: 11/24/2022  Narrative: CHEST INDICATION:   altered mental status  COMPARISON:  CXR 11/17/2022, chest CT 2/8/2022  EXAM PERFORMED/VIEWS:  XR CHEST PORTABLE FINDINGS: Cardiomediastinal silhouette appears unremarkable  Loop recorder in left anterior chest wall  The lungs are clear  No pneumothorax or pleural effusion  Cervical spine surgery  Cholecystectomy  Impression: No acute cardiopulmonary disease   Workstation performed: [id number]

## 2022-12-10 DIAGNOSIS — K21.9 GASTROESOPHAGEAL REFLUX DISEASE, UNSPECIFIED WHETHER ESOPHAGITIS PRESENT: ICD-10-CM

## 2022-12-12 RX ORDER — PANTOPRAZOLE SODIUM 40 MG/1
TABLET, DELAYED RELEASE ORAL
Qty: 90 TABLET | Refills: 1 | Status: SHIPPED | OUTPATIENT
Start: 2022-12-12

## 2022-12-29 ENCOUNTER — OFFICE VISIT (OUTPATIENT)
Dept: PSYCHIATRY | Facility: CLINIC | Age: 67
End: 2022-12-29

## 2022-12-29 VITALS
BODY MASS INDEX: 26.99 KG/M2 | WEIGHT: 162 LBS | DIASTOLIC BLOOD PRESSURE: 96 MMHG | SYSTOLIC BLOOD PRESSURE: 143 MMHG | HEART RATE: 89 BPM | HEIGHT: 65 IN

## 2022-12-29 DIAGNOSIS — F41.9 ANXIETY AND DEPRESSION: ICD-10-CM

## 2022-12-29 DIAGNOSIS — F43.10 PTSD (POST-TRAUMATIC STRESS DISORDER): Chronic | ICD-10-CM

## 2022-12-29 DIAGNOSIS — Z63.0 MARITAL CONFLICT: ICD-10-CM

## 2022-12-29 DIAGNOSIS — F32.A ANXIETY AND DEPRESSION: ICD-10-CM

## 2022-12-29 DIAGNOSIS — F33.41 MAJOR DEPRESSIVE DISORDER, RECURRENT, IN PARTIAL REMISSION (HCC): ICD-10-CM

## 2022-12-29 DIAGNOSIS — F10.21 ALCOHOL DEPENDENCE IN REMISSION (HCC): Chronic | ICD-10-CM

## 2022-12-29 DIAGNOSIS — F63.0 GAMBLING DISORDER, MODERATE: ICD-10-CM

## 2022-12-29 DIAGNOSIS — F33.41 RECURRENT MAJOR DEPRESSIVE DISORDER, IN PARTIAL REMISSION (HCC): Primary | ICD-10-CM

## 2022-12-29 DIAGNOSIS — F10.21 ALCOHOL DEPENDENCE, IN REMISSION (HCC): ICD-10-CM

## 2022-12-29 DIAGNOSIS — F41.1 GENERALIZED ANXIETY DISORDER: ICD-10-CM

## 2022-12-29 RX ORDER — QUETIAPINE FUMARATE 100 MG/1
100 TABLET, FILM COATED ORAL
Qty: 90 TABLET | Refills: 1 | Status: SHIPPED | OUTPATIENT
Start: 2022-12-29 | End: 2023-06-27

## 2022-12-29 RX ORDER — DULOXETIN HYDROCHLORIDE 60 MG/1
60 CAPSULE, DELAYED RELEASE ORAL DAILY
Qty: 90 CAPSULE | Refills: 1 | Status: SHIPPED | OUTPATIENT
Start: 2022-12-29

## 2022-12-29 SDOH — SOCIAL STABILITY - SOCIAL INSECURITY: PROBLEMS IN RELATIONSHIP WITH SPOUSE OR PARTNER: Z63.0

## 2022-12-29 NOTE — PSYCH
Subjective:     Patient ID: Libra Valentino is a 78 y o  female history of alcohol abuse, gambling addiction, PTSD, ELLY, MDD seen for medication management and mood assessment  Alysha Lawson is a transfer of care from KRISTIE Gambino who recently retired  She reports that she feel asleep in a easy chair and  found her on the floor the next day crying and disoriented  She was taken to the hospital and then transferred to the [facility]  They adjusted her medications and believe her medications interacted with a BP medication  She has mild anxiety and depression  Would like to resume talk therapy  Gambled for two weeks and lost money  Takes medication as prescribed and family are supportive  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      HPI ROS Appetite Changes and Sleep: normal appetite and normal energy level    Review Of Systems:     Mood Anxiety and Depression   Behavior Normal    Thought Content Normal   General Relationship Problems, Emotional Problems and Sleep Disturbances   Personality Normal   Other Psych Symptoms Normal   Constitutional As Noted in HPI   ENT As Noted in HPI   Cardiovascular Negative and As Noted in HPI   Respiratory As Noted in HPI   Gastrointestinal As Noted in HPI   Genitourinary As Noted in HPI   Musculoskeletal As Noted in HPI   Integumentary As Noted in HPI   Neurological As Noted in HPI   Endocrine Normal    Other Symptoms Normal      Substance Abuse History:  Social History     Substance and Sexual Activity   Drug Use Never       Family Psychiatric History:   Family History   Problem Relation Age of Onset   • Heart disease Mother    • Stroke Mother 58   • COPD Mother    • Arthritis Mother    • Hypertension Father    • Kidney disease Brother         kidney transplant   • Multiple sclerosis Sister    • No Known Problems Maternal Aunt    • No Known Problems Maternal Uncle    • No Known Problems Paternal Aunt    • No Known Problems Paternal Uncle    • No Known Problems Maternal Grandmother    • No Known Problems Maternal Grandfather    • No Known Problems Paternal Grandmother    • No Known Problems Paternal Grandfather    • Dislocations Sister    • Neurological problems Sister    • Scoliosis Sister    • ADD / ADHD Neg Hx    • Anesthesia problems Neg Hx    • Cancer Neg Hx    • Clotting disorder Neg Hx    • Collagen disease Neg Hx    • Diabetes Neg Hx    • Dislocations Neg Hx    • Learning disabilities Neg Hx    • Neurological problems Neg Hx    • Osteoporosis Neg Hx    • Rheumatologic disease Neg Hx    • Scoliosis Neg Hx    • Vascular Disease Neg Hx        The following portions of the patient's history were reviewed and updated as appropriate: allergies, current medications, past family history, past medical history, past social history, past surgical history and problem list     Social History     Socioeconomic History   • Marital status: /Civil Union     Spouse name: Not on file   • Number of children: Not on file   • Years of education: Not on file   • Highest education level: Not on file   Occupational History   • Not on file   Tobacco Use   • Smoking status: Never   • Smokeless tobacco: Never   Vaping Use   • Vaping Use: Never used   Substance and Sexual Activity   • Alcohol use: Not Currently   • Drug use: Never   • Sexual activity: Not Currently   Other Topics Concern   • Not on file   Social History Narrative   • Not on file     Social Determinants of Health     Financial Resource Strain: Not on file   Food Insecurity: Not on file   Transportation Needs: Not on file   Physical Activity: Not on file   Stress: Not on file   Social Connections: Not on file   Intimate Partner Violence: Not on file   Housing Stability: Not on file     Social History     Social History Narrative   • Not on file       Objective:       Mental status:  Appearance calm and cooperative , adequate hygiene and grooming and good eye contact    Mood anxious   Affect affect appropriate    Speech a normal rate   Thought Processes normal thought processes   Hallucinations no hallucinations present    Thought Content no delusions   Abnormal Thoughts passive/fleeting thoughts of suicide no intent   Orientation  oriented to person and place and time   Remote Memory short term memory intact and long term memory intact   Attention Span concentration intact   Intellect Appears to be of Average Intelligence   Insight Insight intact   Judgement judgment was impaired   Muscle Strength Muscle strength and tone were normal   Language no difficulty naming common objects   Fund of Knowledge displays adequate knowledge of current events   Pain none   Pain Scale 0       Assessment/Plan:       Diagnoses and all orders for this visit:    Recurrent major depressive disorder, in partial remission ([facility] )  -     Ambulatory referral to Stephanie [facility]inas; Future    Alcohol dependence, in remission ([facility] )    Major depressive disorder, recurrent, in partial remission ([facility])  -     QUEtiapine (SEROquel) 100 mg tablet; Take 1 tablet (100 mg total) by mouth daily at bedtime  -     DULoxetine (CYMBALTA) 60 mg delayed release capsule; Take 1 capsule (60 mg total) by mouth daily            Treatment Recommendations- Risks Benefits      Immediate Medical/Psychiatric/Psychotherapy Treatments and Any Precautions:  reviewed medication continue with treatment plan, reviewed history and medication    Risks, Benefits And Possible Side Effects Of Medications:  {PSYCH RISK, BENEFITS AND POSSIBLE SIDE EFFECTS (Optional):96756       Psychotherapy Provided: 45 min Individual psychotherapy provided  Supportive therapy  Medication evaluation  Mood assessment  Therapy referral  Discussed PTSD    Goals discussed in session: improve mood    Treatment plan  due this session however, time of session was used entirely for PTSD discussion  She verbally agreed to therapy referral and to keep medications as they are

## 2022-12-31 ENCOUNTER — OFFICE VISIT (OUTPATIENT)
Dept: URGENT CARE | Facility: CLINIC | Age: 67
End: 2022-12-31

## 2022-12-31 VITALS
RESPIRATION RATE: 20 BRPM | WEIGHT: 164 LBS | DIASTOLIC BLOOD PRESSURE: 70 MMHG | SYSTOLIC BLOOD PRESSURE: 117 MMHG | OXYGEN SATURATION: 99 % | TEMPERATURE: 98.2 F | BODY MASS INDEX: 27.32 KG/M2 | HEART RATE: 78 BPM | HEIGHT: 65 IN

## 2022-12-31 DIAGNOSIS — U07.1 COVID-19: Primary | ICD-10-CM

## 2022-12-31 LAB
SARS-COV-2 AG UPPER RESP QL IA: POSITIVE
VALID CONTROL: ABNORMAL

## 2022-12-31 NOTE — PROGRESS NOTES
[facility]        NAME: Nel Lazcano is a 78 y o  female  : 1955    MRN: [id number]  DATE: 2022  TIME: 12:53 PM    Assessment and Plan   COVID-19 [U07 1]  1  COVID-19  Poct Covid 19 Rapid Antigen Test        Discussed strict return to care precautions as well as red flag symptoms which should prompt immediate ED referral  Pt verbalized understanding and is in agreement with plan  Please follow up with your primary care provider within the next week  Please remember that your visit today was with an urgent care provider and should not replace follow up with your primary care provider for chronic medical issues or annual physicals  (Directly from [email or web address])  IF YOU TEST POSITIVE FOR COVID-19:  If you have symptoms, you can end isolation after 5 full days if you are fever-free for 24 hours without the use of fever-reducing medication and your other symptoms have improved  Loss of taste and/or smell may persist for weeks or more and should not delay the end of isolation  Your first day of symptoms is DAY ZERO  You should continue to wear a well-fitting mask (like N95, [id number]) both at home and in public for 5 additional days  Avoid people who are at high risk for severe disease for at least 10 days  DO NOT go to places where you are unable to wear a mask until a full 10 days from your first symptom  If you have no symptoms, quarantine for 5 days from the day you were tested  The day you were tested is DAY ZERO  Continue wearing a mask around others until day 10  If you develop symptoms, your 5-day isolation period starts over  If you have/had severe symptoms and/or a compromised immune system, you may need to isolate longer  Consult with your primary care provider about when you can resume being around other people      IF YOU HAVE HAD CLOSE EXPOSURE:  QUARANTINE IF: You are ages 25 or older and either have not been vaccinated, or have been vaccinated with your primary series but not the booster  You must quarantine for at least 5 days after your last contact  If you develop symptoms, get tested immediately  If you do not develop symptoms, get tested at least 5 days after your last exposure  Avoid people who are immunocompromised at high risk for severe disease until at least after 10 days  YOU DO NOT HAVE TO QUARANTINE IF:   • You are 18 years or older and have received all recommended vaccine doses, including boosters and additional primary shots for some immunocompromised people  • You are ages 9-17 and completed the primary series of COVID-19 vaccines  • You had confirmed COVID-19 within the last 90 days on a viral test   You should still wear a well-fitting mask around others for 10 days from the date of your last close exposure to COVID-19, and get tested at least 5 days later  Quarantine and isolation guidelines differ for healthcare professionals  Patient Instructions       Follow up with PCP in 3-5 days  Proceed to  ER if symptoms worsen  Chief Complaint     Chief Complaint   Patient presents with   • Cough     Cough x 2 days  History of Present Illness       Tootie Reyes is a(n) 78 y o  female presenting with URI symptoms x 1 days  Congestion: yes  Sore throat: yes  Cough: yes  Sputum production: yes yellow/brownish  Fever: yes, subjective yesterday  Body aches: no  Loss of smell/taste: no  GI symptoms: no  Known sick contacts: no  Vaccinated against COVID19: yes        Review of Systems   Review of Systems   Constitutional:        Negative except as noted in HPI   Respiratory: Negative for shortness of breath  Cardiovascular: Negative for chest pain           Current Medications       Current Outpatient Medications:   •  acetaminophen (TYLENOL) 650 mg CR tablet, if needed, Disp: , Rfl:   •  albuterol (ProAir HFA) 90 mcg/act inhaler, Inhale 2 puffs every 6 (six) hours as needed for wheezing, Disp: 8 5 g, Rfl: 0  •  albuterol (ProAir HFA) 90 mcg/act inhaler, Inhale 2 puffs every 6 (six) hours as needed for wheezing, Disp: 18 g, Rfl: 2  •  atorvastatin (LIPITOR) 20 mg tablet, TAKE 1 TABLET BY MOUTH EVERY DAY, Disp: 90 tablet, Rfl: 2  •  calcium carbonate (OS-WILLY) 1250 (500 Ca) MG tablet, Take 1,250 mg by mouth, Disp: , Rfl:   •  [employer] Aspirin Adult Low Strength 81 MG EC tablet, CHEW AND SWALLOW 1 TABLET BY MOUTH ONCE DAILY, Disp: , Rfl:   •  DULoxetine (CYMBALTA) 60 mg delayed release capsule, Take 1 capsule (60 mg total) by mouth daily, Disp: 90 capsule, Rfl: 1  •  esomeprazole (NexIUM) 40 MG capsule, Take 1 capsule (40 mg total) by mouth daily before breakfast, Disp: 90 capsule, Rfl: 5  •  gabapentin (NEURONTIN) 300 mg capsule, Take 1 capsule (300 mg total) by mouth 3 (three) times a day, Disp: 90 capsule, Rfl: 5  •  levothyroxine 50 mcg tablet, TAKE 1 TABLET BY MOUTH EVERY DAY, Disp: 90 tablet, Rfl: 0  •  Magnesium 400 MG TABS, Take by mouth daily, Disp: , Rfl:   •  midodrine (PROAMATINE) 10 MG tablet, Take 1 tablet (10 mg total) by mouth 3 (three) times a day (Patient taking differently: Take 10 mg by mouth 2 (two) times a day), Disp: 270 tablet, Rfl: 0  •  pantoprazole (PROTONIX) 40 mg tablet, TAKE 1 TABLET BY MOUTH EVERY DAY, Disp: 90 tablet, Rfl: 1  •  QUEtiapine (SEROquel) 100 mg tablet, Take 1 tablet (100 mg total) by mouth daily at bedtime, Disp: 90 tablet, Rfl: 1    Current Allergies     Allergies as of 12/31/2022 - Reviewed 12/31/2022   Allergen Reaction Noted   • Meperidine Lightheadedness and Other (See Comments) 01/11/2006   • Sulfa antibiotics Hives 01/11/2006            The following portions of the patient's history were reviewed and updated as appropriate: allergies, current medications, past family history, past medical history, past social history, past surgical history and problem list      Past Medical History:   Diagnosis Date   • Abdominal adhesions    • Anesthesia complication     difficult to wake up   • Anxiety    • Arthritis    • Cancer ([facility] [address] ) melanoma   • Colon polyp    • Depression    • Depression    • Disease of thyroid gland    • Fibromyalgia    • Fibromyalgia, primary    • Fractures 2006   • GERD (gastroesophageal reflux disease)    • History of cholecystectomy 09/07/2019   • Hyperlipidemia    • Irregular heart beat     can be tachy; also has orthoststic hypotension   • Lazy eye     resolved: 3/27/17   • Medical clearance for psychiatric admission 07/13/2021   • Melanoma ([address] ) 2010   • Osteoarthritis 07/2018   • Osteopenia     with joint pain-elevated DEV   • Psychiatric disorder    • Shortness of breath     assoc with tachycardia   • Stomach disorder 1995   • Tinnitus    • Wears glasses     for reading       Past Surgical History:   Procedure Laterality Date   • ABDOMINAL SURGERY      lysis of adhesions x 2   • APPENDECTOMY     • CERVICAL FUSION      May 5,2021 and Jan 01,2020   • CHOLECYSTECTOMY      open   • COLONOSCOPY     • DILATION AND CURETTAGE OF UTERUS     • FOOT SURGERY  05/2017   • NECK SURGERY  01/2019 5/2021   • NEUROMA EXCISION Right 05/26/2017    Procedure: EXCISION MASS / FIBROMA FOOT;  Surgeon: Brea Clark DPM;  Location: [address] [place] [address];  Service:    • PARS PLANA VITRECTOMY W/ REPAIR OF MACULAR HOLE  12/23/2020   • AK XCAPSL CTRC RMVL INSJ IO LENS PROSTH W/O ECP Left 12/06/2021    Procedure: EXTRACTION EXTRACAPSULAR CATARACT PHACO INTRAOCULAR LENS (IOL);   Surgeon: Araceli Linares MD;  Location: [facility] MAIN OR;  Service: Ophthalmology   • RIGHT OOPHORECTOMY     • WISDOM TOOTH EXTRACTION      x4       Family History   Problem Relation Age of Onset   • Heart disease Mother    • Stroke Mother 58   • COPD Mother    • Arthritis Mother    • Hypertension Father    • Kidney disease Brother         kidney transplant   • Multiple sclerosis Sister    • No Known Problems Maternal Aunt    • No Known Problems Maternal Uncle    • No Known Problems Paternal Aunt    • No Known Problems Paternal Uncle    • No Known Problems Maternal Grandmother    • No Known Problems Maternal Grandfather    • No Known Problems Paternal Grandmother    • No Known Problems Paternal Grandfather    • Dislocations Sister    • Neurological problems Sister    • Scoliosis Sister    • ADD / ADHD Neg Hx    • Anesthesia problems Neg Hx    • Cancer Neg Hx    • Clotting disorder Neg Hx    • Collagen disease Neg Hx    • Diabetes Neg Hx    • Dislocations Neg Hx    • Learning disabilities Neg Hx    • Neurological problems Neg Hx    • Osteoporosis Neg Hx    • Rheumatologic disease Neg Hx    • Scoliosis Neg Hx    • Vascular Disease Neg Hx          Medications have been verified  Objective   /70   Pulse 78   Temp 98 2 °F (36 8 °C)   Resp 20   Ht 5' 5" (1 651 m)   Wt 74 4 kg (164 lb)   LMP  (LMP Unknown)   SpO2 99%   BMI 27 29 kg/m²        Physical Exam     Physical Exam  Vitals and nursing note reviewed  Constitutional:       General: She is not in acute distress  Appearance: Normal appearance  She is not toxic-appearing  HENT:      Head: Normocephalic and atraumatic  Right Ear: Tympanic membrane, ear canal and external ear normal       Left Ear: Tympanic membrane, ear canal and external ear normal       Nose: No congestion  Mouth/Throat:      Mouth: Mucous membranes are moist       Pharynx: Oropharynx is clear  No oropharyngeal exudate or posterior oropharyngeal erythema  Eyes:      Conjunctiva/sclera: Conjunctivae normal       Pupils: Pupils are equal, round, and reactive to light  Cardiovascular:      Rate and Rhythm: Normal rate and regular rhythm  Heart sounds: Normal heart sounds  Pulmonary:      Effort: Pulmonary effort is normal  No respiratory distress  Breath sounds: Normal breath sounds  No wheezing, rhonchi or rales  Abdominal:      General: Abdomen is flat  Palpations: Abdomen is soft  Musculoskeletal:      Cervical back: Normal range of motion and neck supple  Skin:     General: Skin is warm and dry        Capillary Refill: Capillary refill takes less than 2 seconds  Neurological:      Mental Status: She is alert and oriented to person, place, and time     Psychiatric:         Behavior: Behavior normal

## 2023-01-24 DIAGNOSIS — E03.8 OTHER SPECIFIED HYPOTHYROIDISM: ICD-10-CM

## 2023-01-24 RX ORDER — LEVOTHYROXINE SODIUM 0.05 MG/1
TABLET ORAL
Qty: 90 TABLET | Refills: 0 | Status: SHIPPED | OUTPATIENT
Start: 2023-01-24

## 2023-01-26 ENCOUNTER — OFFICE VISIT (OUTPATIENT)
Dept: FAMILY MEDICINE CLINIC | Facility: CLINIC | Age: 68
End: 2023-01-26

## 2023-01-26 ENCOUNTER — APPOINTMENT (OUTPATIENT)
Dept: RADIOLOGY | Facility: CLINIC | Age: 68
End: 2023-01-26

## 2023-01-26 VITALS
SYSTOLIC BLOOD PRESSURE: 128 MMHG | HEART RATE: 90 BPM | DIASTOLIC BLOOD PRESSURE: 78 MMHG | BODY MASS INDEX: 26.66 KG/M2 | TEMPERATURE: 96.7 F | HEIGHT: 65 IN | RESPIRATION RATE: 17 BRPM | OXYGEN SATURATION: 97 % | WEIGHT: 160 LBS

## 2023-01-26 DIAGNOSIS — M54.12 CERVICAL MYELOPATHY WITH CERVICAL RADICULOPATHY (HCC): ICD-10-CM

## 2023-01-26 DIAGNOSIS — F33.41 MAJOR DEPRESSIVE DISORDER, RECURRENT, IN PARTIAL REMISSION (HCC): ICD-10-CM

## 2023-01-26 DIAGNOSIS — A69.23 LYME ARTHRITIS (HCC): ICD-10-CM

## 2023-01-26 DIAGNOSIS — C80.1 CANCER (HCC): ICD-10-CM

## 2023-01-26 DIAGNOSIS — R04.2 SPUTUM BLOODY: ICD-10-CM

## 2023-01-26 DIAGNOSIS — G95.9 CERVICAL MYELOPATHY WITH CERVICAL RADICULOPATHY (HCC): ICD-10-CM

## 2023-01-26 DIAGNOSIS — R04.2 COUGH WITH HEMOPTYSIS: ICD-10-CM

## 2023-01-26 DIAGNOSIS — F10.21 ALCOHOL DEPENDENCE, IN REMISSION (HCC): ICD-10-CM

## 2023-01-26 DIAGNOSIS — R04.2 COUGH WITH HEMOPTYSIS: Primary | ICD-10-CM

## 2023-01-26 DIAGNOSIS — R06.2 WHEEZING: ICD-10-CM

## 2023-01-26 DIAGNOSIS — M35.3 POLYMYALGIA (HCC): ICD-10-CM

## 2023-01-26 RX ORDER — FLUTICASONE PROPIONATE 110 UG/1
1 AEROSOL, METERED RESPIRATORY (INHALATION) 2 TIMES DAILY
Qty: 12 G | Refills: 0 | Status: SHIPPED | OUTPATIENT
Start: 2023-01-26

## 2023-01-26 RX ORDER — AMOXICILLIN AND CLAVULANATE POTASSIUM 875; 125 MG/1; MG/1
1 TABLET, FILM COATED ORAL EVERY 12 HOURS SCHEDULED
Qty: 20 TABLET | Refills: 0 | Status: SHIPPED | OUTPATIENT
Start: 2023-01-26 | End: 2023-02-05

## 2023-01-26 RX ORDER — BENZONATATE 200 MG/1
200 CAPSULE ORAL 3 TIMES DAILY PRN
Qty: 30 CAPSULE | Refills: 0 | Status: SHIPPED | OUTPATIENT
Start: 2023-01-26

## 2023-01-26 NOTE — PROGRESS NOTES
Assessment/Plan:    1  Cough with hemoptysis  -     fluticasone (Flovent HFA) 110 MCG/ACT inhaler; Inhale 1 puff 2 (two) times a day Rinse mouth after use  -     amoxicillin-clavulanate (Augmentin) 875-125 mg per tablet; Take 1 tablet by mouth every 12 (twelve) hours for 10 days  -     XR chest pa & lateral; Future; Expected date: 01/26/2023  -     benzonatate (TESSALON) 200 MG capsule; Take 1 capsule (200 mg total) by mouth 3 (three) times a day as needed for cough    2  Wheezing  -     fluticasone (Flovent HFA) 110 MCG/ACT inhaler; Inhale 1 puff 2 (two) times a day Rinse mouth after use  -     amoxicillin-clavulanate (Augmentin) 875-125 mg per tablet; Take 1 tablet by mouth every 12 (twelve) hours for 10 days  -     XR chest pa & lateral; Future; Expected date: 01/26/2023  -     benzonatate (TESSALON) 200 MG capsule; Take 1 capsule (200 mg total) by mouth 3 (three) times a day as needed for cough    3  Sputum bloody  -     fluticasone (Flovent HFA) 110 MCG/ACT inhaler; Inhale 1 puff 2 (two) times a day Rinse mouth after use  -     amoxicillin-clavulanate (Augmentin) 875-125 mg per tablet; Take 1 tablet by mouth every 12 (twelve) hours for 10 days  -     XR chest pa & lateral; Future; Expected date: 01/26/2023  -     benzonatate (TESSALON) 200 MG capsule; Take 1 capsule (200 mg total) by mouth 3 (three) times a day as needed for cough    4  Cervical myelopathy with cervical radiculopathy (HCC)    5  Polymyalgia ([address] )    6  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[address] )    7  Alcohol dependence, in remission ([address] )    8  Major depressive disorder, recurrent, in partial remission ([address] )    9  Cancer ([address] )  Pt seems to have developed superinfection will treat and follow        There are no Patient Instructions on file for this visit  No follow-ups on file  Subjective:      Patient ID: Shelbie Home is a 78 y o  female  Chief Complaint   Patient presents with   • Cough     Patient is coughing up chunky bloody mucous   Cough Ongoing ever since patient had covid 12/31/22, but the bloody mucous started yesterday  Randal Zapata, YVONNE    • Nasal Congestion       Pt is being seen for a same day appt for bloody mucus  Pt states she has not felt well since she has had covid over new years  L:ots of nasal dripping  Cough, some blood in it  Today he had two big blobs of red mucus  Pt states she blew her nose today and states it completely filled the tissue  The following portions of the patient's history were reviewed and updated as appropriate: allergies, current medications, past family history, past medical history, past social history, past surgical history and problem list     Review of Systems   HENT: Positive for congestion and postnasal drip  Respiratory: Positive for cough            Current Outpatient Medications   Medication Sig Dispense Refill   • acetaminophen (TYLENOL) 650 mg CR tablet if needed     • albuterol (ProAir HFA) 90 mcg/act inhaler Inhale 2 puffs every 6 (six) hours as needed for wheezing 8 5 g 0   • albuterol (ProAir HFA) 90 mcg/act inhaler Inhale 2 puffs every 6 (six) hours as needed for wheezing 18 g 2   • amoxicillin-clavulanate (Augmentin) 875-125 mg per tablet Take 1 tablet by mouth every 12 (twelve) hours for 10 days 20 tablet 0   • atorvastatin (LIPITOR) 20 mg tablet TAKE 1 TABLET BY MOUTH EVERY DAY 90 tablet 2   • benzonatate (TESSALON) 200 MG capsule Take 1 capsule (200 mg total) by mouth 3 (three) times a day as needed for cough 30 capsule 0   • calcium carbonate (OS-WILLY) 1250 (500 Ca) MG tablet Take 1,250 mg by mouth     • [employer] Aspirin Adult Low Strength 81 MG EC tablet CHEW AND SWALLOW 1 TABLET BY MOUTH ONCE DAILY     • DULoxetine (CYMBALTA) 60 mg delayed release capsule Take 1 capsule (60 mg total) by mouth daily 90 capsule 1   • esomeprazole (NexIUM) 40 MG capsule Take 1 capsule (40 mg total) by mouth daily before breakfast 90 capsule 5   • fluticasone (Flovent HFA) 110 MCG/ACT inhaler Inhale 1 puff 2 (two) times a day Rinse mouth after use  12 g 0   • gabapentin (NEURONTIN) 300 mg capsule Take 1 capsule (300 mg total) by mouth 3 (three) times a day 90 capsule 5   • levothyroxine 50 mcg tablet TAKE 1 TABLET BY MOUTH EVERY DAY 90 tablet 0   • Magnesium 400 MG TABS Take by mouth daily     • midodrine (PROAMATINE) 10 MG tablet Take 1 tablet (10 mg total) by mouth 3 (three) times a day (Patient taking differently: Take 10 mg by mouth 2 (two) times a day) 270 tablet 0   • QUEtiapine (SEROquel) 100 mg tablet Take 1 tablet (100 mg total) by mouth daily at bedtime 90 tablet 1   • pantoprazole (PROTONIX) 40 mg tablet TAKE 1 TABLET BY MOUTH EVERY DAY (Patient not taking: Reported on 1/26/2023) 90 tablet 1     No current facility-administered medications for this visit  Objective:    /78   Pulse 90   Temp (!) 96 7 °F (35 9 °C)   Resp 17   Ht 5' 5" (1 651 m)   Wt 72 6 kg (160 lb)   LMP  (LMP Unknown)   SpO2 97%   BMI 26 63 kg/m²        Physical Exam  Cardiovascular:      Heart sounds: Murmur heard                  Ramos Heading, DO

## 2023-02-08 ENCOUNTER — HOSPITAL ENCOUNTER (EMERGENCY)
Facility: HOSPITAL | Age: 68
Discharge: HOME | End: 2023-02-08
Attending: EMERGENCY MEDICINE

## 2023-02-08 ENCOUNTER — APPOINTMENT (EMERGENCY)
Dept: RADIOLOGY | Facility: HOSPITAL | Age: 68
End: 2023-02-08

## 2023-02-08 VITALS
DIASTOLIC BLOOD PRESSURE: 76 MMHG | OXYGEN SATURATION: 98 % | RESPIRATION RATE: 20 BRPM | TEMPERATURE: 98.4 F | HEART RATE: 125 BPM | SYSTOLIC BLOOD PRESSURE: 120 MMHG | WEIGHT: 158 LBS | BODY MASS INDEX: 26.29 KG/M2

## 2023-02-08 DIAGNOSIS — R11.2 NAUSEA AND VOMITING: Primary | ICD-10-CM

## 2023-02-08 DIAGNOSIS — K52.9 GASTROENTERITIS: ICD-10-CM

## 2023-02-08 LAB
ALBUMIN SERPL BCP-MCNC: 3.8 G/DL (ref 3.5–5)
ALP SERPL-CCNC: 88 U/L (ref 46–116)
ALT SERPL W P-5'-P-CCNC: 48 U/L (ref 12–78)
ANION GAP SERPL CALCULATED.3IONS-SCNC: 10 MMOL/L (ref 4–13)
AST SERPL W P-5'-P-CCNC: 62 U/L (ref 5–45)
BASOPHILS # BLD AUTO: 0.02 THOUSANDS/ÂΜL (ref 0–0.1)
BASOPHILS NFR BLD AUTO: 0 % (ref 0–1)
BILIRUB SERPL-MCNC: 0.63 MG/DL (ref 0.2–1)
BUN SERPL-MCNC: 19 MG/DL (ref 5–25)
CALCIUM SERPL-MCNC: 9.4 MG/DL (ref 8.3–10.1)
CHLORIDE SERPL-SCNC: 101 MMOL/L (ref 96–108)
CO2 SERPL-SCNC: 24 MMOL/L (ref 21–32)
CREAT SERPL-MCNC: 0.82 MG/DL (ref 0.6–1.3)
EOSINOPHIL # BLD AUTO: 0.03 THOUSAND/ÂΜL (ref 0–0.61)
EOSINOPHIL NFR BLD AUTO: 1 % (ref 0–6)
ERYTHROCYTE [DISTWIDTH] IN BLOOD BY AUTOMATED COUNT: 13.8 % (ref 11.6–15.1)
GFR SERPL CREATININE-BSD FRML MDRD: 74 ML/MIN/1.73SQ M
GLUCOSE SERPL-MCNC: 115 MG/DL (ref 65–140)
HCT VFR BLD AUTO: 45.9 % (ref 34.8–46.1)
HGB BLD-MCNC: 15.2 G/DL (ref 11.5–15.4)
IMM GRANULOCYTES # BLD AUTO: 0.02 THOUSAND/UL (ref 0–0.2)
IMM GRANULOCYTES NFR BLD AUTO: 0 % (ref 0–2)
LIPASE SERPL-CCNC: 50 U/L (ref 73–393)
LYMPHOCYTES # BLD AUTO: 0.9 THOUSANDS/ÂΜL (ref 0.6–4.47)
LYMPHOCYTES NFR BLD AUTO: 15 % (ref 14–44)
MAGNESIUM SERPL-MCNC: 2.3 MG/DL (ref 1.6–2.6)
MCH RBC QN AUTO: 28.8 PG (ref 26.8–34.3)
MCHC RBC AUTO-ENTMCNC: 33.1 G/DL (ref 31.4–37.4)
MCV RBC AUTO: 87 FL (ref 82–98)
MONOCYTES # BLD AUTO: 0.68 THOUSAND/ÂΜL (ref 0.17–1.22)
MONOCYTES NFR BLD AUTO: 11 % (ref 4–12)
NEUTROPHILS # BLD AUTO: 4.55 THOUSANDS/ÂΜL (ref 1.85–7.62)
NEUTS SEG NFR BLD AUTO: 73 % (ref 43–75)
NRBC BLD AUTO-RTO: 0 /100 WBCS
PLATELET # BLD AUTO: 255 THOUSANDS/UL (ref 149–390)
PMV BLD AUTO: 11 FL (ref 8.9–12.7)
POTASSIUM SERPL-SCNC: 4.4 MMOL/L (ref 3.5–5.3)
PROT SERPL-MCNC: 8.3 G/DL (ref 6.4–8.4)
RBC # BLD AUTO: 5.28 MILLION/UL (ref 3.81–5.12)
SODIUM SERPL-SCNC: 135 MMOL/L (ref 135–147)
WBC # BLD AUTO: 6.2 THOUSAND/UL (ref 4.31–10.16)

## 2023-02-08 RX ORDER — DICYCLOMINE HCL 20 MG
20 TABLET ORAL 2 TIMES DAILY
Qty: 20 TABLET | Refills: 0 | Status: SHIPPED | OUTPATIENT
Start: 2023-02-08

## 2023-02-08 RX ORDER — ONDANSETRON 2 MG/ML
4 INJECTION INTRAMUSCULAR; INTRAVENOUS ONCE
Status: COMPLETED | OUTPATIENT
Start: 2023-02-08 | End: 2023-02-08

## 2023-02-08 RX ORDER — ONDANSETRON 4 MG/1
4 TABLET, ORALLY DISINTEGRATING ORAL EVERY 6 HOURS PRN
Qty: 9 TABLET | Refills: 0 | Status: SHIPPED | OUTPATIENT
Start: 2023-02-08 | End: 2023-02-11

## 2023-02-08 RX ORDER — PANTOPRAZOLE SODIUM 40 MG/10ML
40 INJECTION, POWDER, LYOPHILIZED, FOR SOLUTION INTRAVENOUS ONCE
Status: COMPLETED | OUTPATIENT
Start: 2023-02-08 | End: 2023-02-08

## 2023-02-08 RX ORDER — MORPHINE SULFATE 4 MG/ML
4 INJECTION, SOLUTION INTRAMUSCULAR; INTRAVENOUS ONCE
Status: COMPLETED | OUTPATIENT
Start: 2023-02-08 | End: 2023-02-08

## 2023-02-08 RX ADMIN — MORPHINE SULFATE 4 MG: 4 INJECTION INTRAVENOUS at 14:15

## 2023-02-08 RX ADMIN — PANTOPRAZOLE SODIUM 40 MG: 40 INJECTION, POWDER, FOR SOLUTION INTRAVENOUS at 13:47

## 2023-02-08 RX ADMIN — ONDANSETRON 4 MG: 2 INJECTION INTRAMUSCULAR; INTRAVENOUS at 13:47

## 2023-02-08 RX ADMIN — SODIUM CHLORIDE 1000 ML: 0.9 INJECTION, SOLUTION INTRAVENOUS at 13:30

## 2023-02-08 RX ADMIN — IOHEXOL 100 ML: 350 INJECTION, SOLUTION INTRAVENOUS at 14:23

## 2023-02-08 NOTE — ED PROVIDER NOTES
History  Chief Complaint   Patient presents with   • Vomiting     Vomiting since Monday  Unable to tolerate anything po  Now has diarrhea  C/o nausea,vomiting and abdominal pain for the past few days  Chronic history of crohn's  No diarrhea or constipation, no cough,fevers,chills,dysuria, urgency or frequency  No dyspnea no other symptoms  History provided by:  Patient   used: No    Nausea  The primary symptoms include abdominal pain, nausea and vomiting  Prior to Admission Medications   Prescriptions Last Dose Informant Patient Reported? Taking?    [employer] Aspirin Adult Low Strength 81 MG EC tablet   Yes No   Sig: CHEW AND SWALLOW 1 TABLET BY MOUTH ONCE DAILY   DULoxetine (CYMBALTA) 60 mg delayed release capsule   No No   Sig: Take 1 capsule (60 mg total) by mouth daily   Magnesium 400 MG TABS   Yes No   Sig: Take by mouth daily   QUEtiapine (SEROquel) 100 mg tablet   No No   Sig: Take 1 tablet (100 mg total) by mouth daily at bedtime   acetaminophen (TYLENOL) 650 mg CR tablet   Yes No   Sig: if needed   albuterol (ProAir HFA) 90 mcg/act inhaler   No No   Sig: Inhale 2 puffs every 6 (six) hours as needed for wheezing   albuterol (ProAir HFA) 90 mcg/act inhaler   No No   Sig: Inhale 2 puffs every 6 (six) hours as needed for wheezing   atorvastatin (LIPITOR) 20 mg tablet   No No   Sig: TAKE 1 TABLET BY MOUTH EVERY DAY   benzonatate (TESSALON) 200 MG capsule   No No   Sig: Take 1 capsule (200 mg total) by mouth 3 (three) times a day as needed for cough   calcium carbonate (OS-WILLY) 1250 (500 Ca) MG tablet   Yes No   Sig: Take 1,250 mg by mouth   esomeprazole (NexIUM) 40 MG capsule   No No   Sig: Take 1 capsule (40 mg total) by mouth daily before breakfast   fluticasone (Flovent HFA) 110 MCG/ACT inhaler   No No   Sig: Inhale 1 puff 2 (two) times a day Rinse mouth after use    gabapentin (NEURONTIN) 300 mg capsule   No No   Sig: Take 1 capsule (300 mg total) by mouth 3 (three) times a day levothyroxine 50 mcg tablet   No No   Sig: TAKE 1 TABLET BY MOUTH EVERY DAY   midodrine (PROAMATINE) 10 MG tablet   No No   Sig: Take 1 tablet (10 mg total) by mouth 3 (three) times a day   Patient taking differently: Take 10 mg by mouth 2 (two) times a day   pantoprazole (PROTONIX) 40 mg tablet   No No   Sig: TAKE 1 TABLET BY MOUTH EVERY DAY   Patient not taking: Reported on 1/26/2023      Facility-Administered Medications: None       Past Medical History:   Diagnosis Date   • Abdominal adhesions    • Anesthesia complication     difficult to wake up   • Anxiety    • Arthritis    • Cancer (HCC)     melanoma   • Colon polyp    • Depression    • Depression    • Disease of thyroid gland    • Fibromyalgia    • Fibromyalgia, primary    • Fractures 2006   • GERD (gastroesophageal reflux disease)    • History of cholecystectomy 09/07/2019   • Hyperlipidemia    • Irregular heart beat     can be tachy; also has orthoststic hypotension   • Lazy eye     resolved: 3/27/17   • Medical clearance for psychiatric admission 07/13/2021   • Melanoma ([facility] [address] ) 2010   • Osteoarthritis 07/2018   • Osteopenia     with joint pain-elevated DEV   • Psychiatric disorder    • Shortness of breath     assoc with tachycardia   • Stomach disorder 1995   • Tinnitus    • Wears glasses     for reading       Past Surgical History:   Procedure Laterality Date   • ABDOMINAL SURGERY      lysis of adhesions x 2   • APPENDECTOMY     • CERVICAL FUSION      May 5,2021 and Jan 01,2020   • CHOLECYSTECTOMY      open   • COLONOSCOPY     • DILATION AND CURETTAGE OF UTERUS     • FOOT SURGERY  05/2017   • NECK SURGERY  01/2019 5/2021   • NEUROMA EXCISION Right 05/26/2017    Procedure: EXCISION MASS / FIBROMA FOOT;  Surgeon: Elmo Ortiz DPM;  Location: [facility];  Service:    • PARS PLANA VITRECTOMY W/ REPAIR OF MACULAR HOLE  12/23/2020   • NE XCAPSL CTRC RMVL INSJ IO LENS PROSTH W/O ECP Left 12/06/2021    Procedure: EXTRACTION EXTRACAPSULAR CATARACT PHACO INTRAOCULAR LENS (IOL); Surgeon: Meg Shi MD;  Location: [facility] MAIN OR;  Service: Ophthalmology   • RIGHT OOPHORECTOMY     • WISDOM TOOTH EXTRACTION      x4       Family History   Problem Relation Age of Onset   • Heart disease Mother    • Stroke Mother 58   • COPD Mother    • Arthritis Mother    • Hypertension Father    • Kidney disease Brother         kidney transplant   • Multiple sclerosis Sister    • No Known Problems Maternal Aunt    • No Known Problems Maternal Uncle    • No Known Problems Paternal Aunt    • No Known Problems Paternal Uncle    • No Known Problems Maternal Grandmother    • No Known Problems Maternal Grandfather    • No Known Problems Paternal Grandmother    • No Known Problems Paternal Grandfather    • Dislocations Sister    • Neurological problems Sister    • Scoliosis Sister    • ADD / ADHD Neg Hx    • Anesthesia problems Neg Hx    • Cancer Neg Hx    • Clotting disorder Neg Hx    • Collagen disease Neg Hx    • Diabetes Neg Hx    • Dislocations Neg Hx    • Learning disabilities Neg Hx    • Neurological problems Neg Hx    • Osteoporosis Neg Hx    • Rheumatologic disease Neg Hx    • Scoliosis Neg Hx    • Vascular Disease Neg Hx      I have reviewed and agree with the history as documented  E-Cigarette/Vaping   • E-Cigarette Use Never User      E-Cigarette/Vaping Substances   • Nicotine No    • THC No    • CBD No    • Flavoring No    • Other No    • Unknown No      Social History     Tobacco Use   • Smoking status: Never   • Smokeless tobacco: Never   Vaping Use   • Vaping Use: Never used   Substance Use Topics   • Alcohol use: Not Currently   • Drug use: Never       Review of Systems   Constitutional: Negative  HENT: Negative  Eyes: Negative  Respiratory: Negative  Cardiovascular: Negative  Gastrointestinal: Positive for abdominal pain, nausea and vomiting  Endocrine: Negative  Genitourinary: Negative  Musculoskeletal: Negative  Skin: Negative  Allergic/Immunologic: Negative  Neurological: Negative  Hematological: Negative  Psychiatric/Behavioral: Negative  All other systems reviewed and are negative  Physical Exam  Physical Exam  Constitutional:       Appearance: Normal appearance  HENT:      Head: Normocephalic and atraumatic  Nose: Nose normal       Mouth/Throat:      Mouth: Mucous membranes are moist    Eyes:      Extraocular Movements: Extraocular movements intact  Pupils: Pupils are equal, round, and reactive to light  Cardiovascular:      Rate and Rhythm: Normal rate and regular rhythm  Pulmonary:      Effort: Pulmonary effort is normal       Breath sounds: Normal breath sounds  Abdominal:      General: Abdomen is flat  Bowel sounds are normal       Palpations: Abdomen is soft  Tenderness: There is abdominal tenderness  Musculoskeletal:         General: Normal range of motion  Cervical back: Normal range of motion and neck supple  Skin:     General: Skin is warm  Capillary Refill: Capillary refill takes less than 2 seconds  Neurological:      General: No focal deficit present  Mental Status: She is alert and oriented to person, place, and time  Mental status is at baseline  Psychiatric:         Mood and Affect: Mood normal          Thought Content:  Thought content normal          Vital Signs  ED Triage Vitals [02/08/23 1224]   Temperature Pulse Respirations Blood Pressure SpO2   98 4 °F (36 9 °C) (!) 125 20 120/76 98 %      Temp src Heart Rate Source Patient Position - Orthostatic VS BP Location FiO2 (%)   -- -- -- -- --      Pain Score       8           Vitals:    02/08/23 1224   BP: 120/76   Pulse: (!) 125         Visual Acuity      ED Medications  Medications   ondansetron (ZOFRAN) injection 4 mg (4 mg Intravenous Given 2/8/23 1347)   pantoprazole (PROTONIX) injection 40 mg (40 mg Intravenous Given 2/8/23 1347)   sodium chloride 0 9 % bolus 1,000 mL (0 mL Intravenous Stopped 2/8/23 1511)   morphine injection 4 mg (4 mg Intravenous Given 2/8/23 1415)   iohexol (OMNIPAQUE) 350 MG/ML injection (SINGLE-DOSE) 100 mL (100 mL Intravenous Given 2/8/23 1423)       Diagnostic Studies  Results Reviewed     Procedure Component Value Units Date/Time    Comprehensive metabolic panel [[id number]]  (Abnormal) Collected: 02/08/23 1251    Lab Status: Final result Specimen: Blood from Arm, Right Updated: 02/08/23 1322     Sodium 135 mmol/L      Potassium 4 4 mmol/L      Chloride 101 mmol/L      CO2 24 mmol/L      ANION GAP 10 mmol/L      BUN 19 mg/dL      Creatinine 0 82 mg/dL      Glucose 115 mg/dL      Calcium 9 4 mg/dL      AST 62 U/L      ALT 48 U/L      Alkaline Phosphatase 88 U/L      Total Protein 8 3 g/dL      Albumin 3 8 g/dL      Total Bilirubin 0 63 mg/dL      eGFR 74 ml/min/1 73sq m     Narrative:      [place] guidelines for Chronic Kidney Disease (CKD):   •  Stage 1 with normal or high GFR (GFR > 90 mL/min/1 73 square meters)  •  Stage 2 Mild CKD (GFR = 60-89 mL/min/1 73 square meters)  •  Stage 3A Moderate CKD (GFR = 45-59 mL/min/1 73 square meters)  •  Stage 3B Moderate CKD (GFR = 30-44 mL/min/1 73 square meters)  •  Stage 4 Severe CKD (GFR = 15-29 mL/min/1 73 square meters)  •  Stage 5 End Stage CKD (GFR <15 mL/min/1 73 square meters)  Note: GFR calculation is accurate only with a steady state creatinine    Lipase [[id number]]  (Abnormal) Collected: 02/08/23 1251    Lab Status: Final result Specimen: Blood from Arm, Right Updated: 02/08/23 1322     Lipase 50 u/L     Magnesium [[id number]]  (Normal) Collected: 02/08/23 1251    Lab Status: Final result Specimen: Blood from Arm, Right Updated: 02/08/23 1322     Magnesium 2 3 mg/dL     CBC and differential [[id number]]  (Abnormal) Collected: 02/08/23 1251    Lab Status: Final result Specimen: Blood from Arm, Right Updated: 02/08/23 1257     WBC 6 20 Thousand/uL      RBC 5 28 Million/uL      Hemoglobin 15 2 g/dL      Hematocrit 45 9 %      MCV 87 fL      MCH 28 8 pg      MCHC 33 1 g/dL      RDW 13 8 %      MPV 11 0 fL      Platelets 704 Thousands/uL      nRBC 0 /100 WBCs      Neutrophils Relative 73 %      Immat GRANS % 0 %      Lymphocytes Relative 15 %      Monocytes Relative 11 %      Eosinophils Relative 1 %      Basophils Relative 0 %      Neutrophils Absolute 4 55 Thousands/µL      Immature Grans Absolute 0 02 Thousand/uL      Lymphocytes Absolute 0 90 Thousands/µL      Monocytes Absolute 0 68 Thousand/µL      Eosinophils Absolute 0 03 Thousand/µL      Basophils Absolute 0 02 Thousands/µL                  CT abdomen pelvis with contrast   Final Result by Yamila Bond MD (02/08 1449)      Findings suggestive of enteritis involving the few distal small bowel loops in the midline lower abdomen  Mild associated mesenteric vascular engorgement as well as prominence of the mesenteric fat is noted; this can be seen in the setting of Crohn's    disease; correlate with history  The study was marked in [facility] [place] for immediate notification  Workstation performed: [id number]                    Procedures  Procedures         ED Course                                             Medical Decision Making  Gastroenteritis: acute illness or injury  Nausea and vomiting: acute illness or injury  Amount and/or Complexity of Data Reviewed  External Data Reviewed: labs and radiology  Labs: ordered  Decision-making details documented in ED Course  Radiology: ordered  Decision-making details documented in ED Course  Risk  Prescription drug management            Disposition  Final diagnoses:   Nausea and vomiting   Gastroenteritis     Time reflects when diagnosis was documented in both MDM as applicable and the Disposition within this note     Time User Action Codes Description Comment    2/8/2023  3:00 PM Carmen Scanlon [R11 2] Nausea and vomiting     2/8/2023  3:00 PM Carmen Scanlon Add [K52 9] Gastroenteritis       ED Disposition ED Disposition   Discharge    Condition   Stable    Date/Time   Wed Feb 8, 2023  3:00 PM    Comment   Carolyn Templeton discharge to home/self care  Follow-up Information     Follow up With Specialties Details Why Contact Info Additional Information    Julius Canales MD Internal Medicine, Family Medicine Schedule an appointment as soon as possible for a visit   [address]    [address] [facility] [address]  [address] [facility] Emergency Department Emergency Medicine  If symptoms worsen [address] [address] [address] [address] Emergency Department, [place], [place], [address]          Discharge Medication List as of 2/8/2023  3:01 PM      START taking these medications    Details   dicyclomine (BENTYL) 20 mg tablet Take 1 tablet (20 mg total) by mouth 2 (two) times a day, Starting Wed 2/8/2023, Normal      ondansetron (ZOFRAN-ODT) 4 mg disintegrating tablet Take 1 tablet (4 mg total) by mouth every 6 (six) hours as needed for nausea for up to 3 days, Starting Wed 2/8/2023, Until Sat 2/11/2023 at 2359, Normal         CONTINUE these medications which have NOT CHANGED    Details   acetaminophen (TYLENOL) 650 mg CR tablet if needed, Historical Med      !! albuterol (ProAir HFA) 90 mcg/act inhaler Inhale 2 puffs every 6 (six) hours as needed for wheezing, Starting Sat 5/21/2022, Normal      !! albuterol (ProAir HFA) 90 mcg/act inhaler Inhale 2 puffs every 6 (six) hours as needed for wheezing, Starting Thu 11/10/2022, Normal      atorvastatin (LIPITOR) 20 mg tablet TAKE 1 TABLET BY MOUTH EVERY DAY, Normal      benzonatate (TESSALON) 200 MG capsule Take 1 capsule (200 mg total) by mouth 3 (three) times a day as needed for cough, Starting Thu 1/26/2023, Normal      calcium carbonate (OS-WILLY) 1250 (500 Ca) MG tablet Take 1,250 mg by mouth, Historical Med      [employer] Aspirin Adult Low Strength 81 MG EC tablet CHEW AND SWALLOW 1 TABLET BY MOUTH ONCE DAILY, Historical Med      DULoxetine (CYMBALTA) 60 mg delayed release capsule Take 1 capsule (60 mg total) by mouth daily, Starting Thu 12/29/2022, Normal      esomeprazole (NexIUM) 40 MG capsule Take 1 capsule (40 mg total) by mouth daily before breakfast, Starting Fri 12/9/2022, Normal      fluticasone (Flovent HFA) 110 MCG/ACT inhaler Inhale 1 puff 2 (two) times a day Rinse mouth after use , Starting Thu 1/26/2023, Normal      gabapentin (NEURONTIN) 300 mg capsule Take 1 capsule (300 mg total) by mouth 3 (three) times a day, Starting Wed 12/7/2022, Normal      levothyroxine 50 mcg tablet TAKE 1 TABLET BY MOUTH EVERY DAY, Normal      Magnesium 400 MG TABS Take by mouth daily, Historical Med      midodrine (PROAMATINE) 10 MG tablet Take 1 tablet (10 mg total) by mouth 3 (three) times a day, Starting Fri 12/2/2022, Normal      pantoprazole (PROTONIX) 40 mg tablet TAKE 1 TABLET BY MOUTH EVERY DAY, Normal      QUEtiapine (SEROquel) 100 mg tablet Take 1 tablet (100 mg total) by mouth daily at bedtime, Starting Thu 12/29/2022, Until Tue 6/27/2023, Normal       !! - Potential duplicate medications found  Please discuss with provider  No discharge procedures on file      PDMP Review       Value Time User    PDMP Reviewed  Yes 11/24/2022 10:33 AM SHRAVAN Acuña          ED Provider  Electronically Signed by           Ciaran Mcdonnell DO  02/08/23 8080

## 2023-02-10 ENCOUNTER — OFFICE VISIT (OUTPATIENT)
Dept: FAMILY MEDICINE CLINIC | Facility: CLINIC | Age: 68
End: 2023-02-10

## 2023-02-10 VITALS
HEART RATE: 68 BPM | WEIGHT: 160 LBS | HEIGHT: 65 IN | RESPIRATION RATE: 20 BRPM | SYSTOLIC BLOOD PRESSURE: 104 MMHG | DIASTOLIC BLOOD PRESSURE: 70 MMHG | BODY MASS INDEX: 26.66 KG/M2 | TEMPERATURE: 96 F

## 2023-02-10 DIAGNOSIS — R10.816 EPIGASTRIC ABDOMINAL TENDERNESS WITHOUT REBOUND TENDERNESS: Primary | ICD-10-CM

## 2023-02-10 DIAGNOSIS — R11.0 NAUSEA: ICD-10-CM

## 2023-02-10 RX ORDER — SUCRALFATE 1 G/1
1 TABLET ORAL
Qty: 60 TABLET | Refills: 0 | Status: SHIPPED | OUTPATIENT
Start: 2023-02-10

## 2023-02-10 NOTE — PATIENT INSTRUCTIONS
Sucralfate (By mouth)   Sucralfate ([id number])  Treats ulcers  Brand Name(s): Carafate   There may be other brand names for this medicine  When This Medicine Should Not Be Used: This medicine is not right for everyone  Do not use it if you had an allergic reaction to sucralfate  How to Use This Medicine:   Liquid, Tablet  Your doctor will tell you how much medicine to use  Do not use more than directed  It is best to take this medicine on an empty stomach  Do not stop taking the medicine unless your doctor tells you to, even if you feel better  Tablet: Swallow with a glass of water  Take it 1 hour before meals and at bedtime  Oral liquid: Measure the oral liquid medicine with a marked measuring spoon, oral syringe, or medicine cup  Shake it well before each use  Missed dose: Take a dose as soon as you remember  If it is almost time for your next dose, wait until then and take a regular dose  Do not take extra medicine to make up for a missed dose  Store the medicine in a closed container at room temperature, away from heat, moisture, and direct light  Do not freeze the oral liquid  Drugs and Foods to Avoid:   Ask your doctor or pharmacist before using any other medicine, including over-the-counter medicines, vitamins, and herbal products  Some medicines can affect how sucralfate works  Tell your doctor if you are using any of the following:  Cimetidine, digoxin, levothyroxine, phenytoin, quinidine, ranitidine, theophylline  Medicine to treat infection (including fluoroquinolones, ketoconazole, tetracycline)  Take antacids more than one-half hour before or after taking sucralfate oral liquid  Take cimetidine, ciprofloxacin, digoxin, norfloxacin, ofloxacin, or ranitidine 2 hours before you take sucralfate oral liquid  Warnings While Using This Medicine:   Tell your doctor if you are pregnant or breastfeeding, or if you have kidney disease or diabetes    This medicine may cause high blood sugar   Your doctor will check your progress and the effects of this medicine at regular visits  Keep all appointments  Keep all medicine out of the reach of children  Never share your medicine with anyone  Possible Side Effects While Using This Medicine:   Call your doctor right away if you notice any of these side effects: Allergic reaction: Itching or hives, swelling in your face or hands, swelling or tingling in your mouth or throat, chest tightness, trouble breathing  Chest pain, trouble breathing, coughing up blood  Increased hunger or thirst, change in how much or how often you urinate, unusual weight loss  Numbness or weakness in your arm or leg, or on one side of your body  Pain in your lower leg (calf)  Sudden or severe headache, problems with vision, speech, or walking  If you notice these less serious side effects, talk with your doctor:   Constipation  Dizziness  Dry mouth  Mild stomach cramps, diarrhea  Stomach upset, passing gas  If you notice other side effects that you think are caused by this medicine, tell your doctor  Call your doctor for medical advice about side effects  You may report side effects to FDA at 7-388-FDA-3580    © Copyright [employer] 2022 Information is for End User's use only and may not be sold, redistributed or otherwise used for commercial purposes  The above information is an  only  It is not intended as medical advice for individual conditions or treatments  Talk to your doctor, nurse or pharmacist before following any medical regimen to see if it is safe and effective for you

## 2023-02-10 NOTE — PROGRESS NOTES
Assessment/Plan:    1  Epigastric abdominal tenderness without rebound tenderness  -     sucralfate (CARAFATE) 1 g tablet; Take 1 tablet (1 g total) by mouth 4 (four) times a day (before meals and at bedtime)    2  Nausea          Patient Instructions:  Supportive care discussed and advised  Advised to RTO for any worsening and no improvement  Follow up for no improvement and worsening of conditions  Patient advised and educated when to see immediate medical care  Return if symptoms worsen or fail to improve  Future Appointments   Date Time Provider Nick Hancock   2023  3:00 PM Adina Miller PA-C [facility]   3/28/2023  5:00 PM Karlee Sosa, PhD [address]   2023  1:45 PM SHRAVAN Suarez [facility]           Subjective:      Patient ID: Omar Barker is a 78 y o  female  Chief Complaint   Patient presents with   • Follow-up     ER F/U  Still having abd pain and loss of appetite [id number]         Vitals:  /70   Pulse 68   Temp (!) 96 °F (35 6 °C)   Resp 20   Ht 5' 5" (1 651 m)   Wt 72 6 kg (160 lb)   LMP  (LMP Unknown)   BMI 26 63 kg/m²     HPI  Patient stated that started with nausea and vomiting with diarrhea couple of days ago and went to ER on 2023 and stated that since then no vomiting and diarrhea but still having nausea and not eating well  CT abdomen showed enteritis and concern for crohn's disease and will be following with gastro next week         PHQ-2/9 Depression Screening    Little interest or pleasure in doing things: 0 - not at all  Feeling down, depressed, or hopeless: 0 - not at all  Trouble falling or staying asleep, or sleeping too much: 3 - nearly every day  Feeling tired or having little energy: 3 - nearly every day  Poor appetite or overeatin - not at all  Feeling bad about yourself - or that you are a failure or have let yourself or your family down: 0 - not at all  Trouble concentrating on things, such as reading the newspaper or watching television: 0 - not at all  Moving or speaking so slowly that other people could have noticed  Or the opposite - being so fidgety or restless that you have been moving around a lot more than usual: 0 - not at all  Thoughts that you would be better off dead, or of hurting yourself in some way: 0 - not at all  PHQ-9 Score: 6   PHQ-9 Interpretation: Mild depression        Depression Screening Follow-up Plan: Patient's depression screening was positive with a PHQ-2 score of   Their PHQ-9 score was 6  Continue regular follow-up with their psychologist/therapist/psychiatrist who is managing their mental health condition(s)  The following portions of the patient's history were reviewed and updated as appropriate: allergies, current medications, past family history, past medical history, past social history, past surgical history and problem list       Review of Systems   Constitutional: Positive for appetite change  Negative for chills, diaphoresis, fatigue, fever and unexpected weight change  Respiratory: Negative  Cardiovascular: Negative  Gastrointestinal: Positive for abdominal pain and nausea  Negative for diarrhea and vomiting  Genitourinary: Negative for decreased urine volume, difficulty urinating, dysuria, flank pain, frequency, genital sores, hematuria and urgency  Musculoskeletal: Negative  Skin: Negative  Neurological: Negative for dizziness and headaches  Objective:    Social History     Tobacco Use   Smoking Status Never   Smokeless Tobacco Never       Allergies:    Allergies   Allergen Reactions   • Meperidine Lightheadedness and Other (See Comments)   • Sulfa Antibiotics Hives         Current Outpatient Medications   Medication Sig Dispense Refill   • acetaminophen (TYLENOL) 650 mg CR tablet if needed     • albuterol (ProAir HFA) 90 mcg/act inhaler Inhale 2 puffs every 6 (six) hours as needed for wheezing 18 g 2   • atorvastatin (LIPITOR) 20 mg tablet TAKE 1 TABLET BY MOUTH EVERY DAY 90 tablet 2   • benzonatate (TESSALON) 200 MG capsule Take 1 capsule (200 mg total) by mouth 3 (three) times a day as needed for cough 30 capsule 0   • calcium carbonate (OS-WILLY) 1250 (500 Ca) MG tablet Take 1,250 mg by mouth     • [facility] Aspirin Adult Low Strength 81 MG EC tablet CHEW AND SWALLOW 1 TABLET BY MOUTH ONCE DAILY     • dicyclomine (BENTYL) 20 mg tablet Take 1 tablet (20 mg total) by mouth 2 (two) times a day 20 tablet 0   • DULoxetine (CYMBALTA) 60 mg delayed release capsule Take 1 capsule (60 mg total) by mouth daily 90 capsule 1   • esomeprazole (NexIUM) 40 MG capsule Take 1 capsule (40 mg total) by mouth daily before breakfast 90 capsule 5   • fluticasone (Flovent HFA) 110 MCG/ACT inhaler Inhale 1 puff 2 (two) times a day Rinse mouth after use   12 g 0   • gabapentin (NEURONTIN) 300 mg capsule Take 1 capsule (300 mg total) by mouth 3 (three) times a day 90 capsule 5   • levothyroxine 50 mcg tablet TAKE 1 TABLET BY MOUTH EVERY DAY 90 tablet 0   • Magnesium 400 MG TABS Take by mouth daily     • midodrine (PROAMATINE) 10 MG tablet Take 1 tablet (10 mg total) by mouth 3 (three) times a day (Patient taking differently: Take 10 mg by mouth 2 (two) times a day) 270 tablet 0   • ondansetron (ZOFRAN-ODT) 4 mg disintegrating tablet Take 1 tablet (4 mg total) by mouth every 6 (six) hours as needed for nausea for up to 3 days 9 tablet 0   • QUEtiapine (SEROquel) 100 mg tablet Take 1 tablet (100 mg total) by mouth daily at bedtime 90 tablet 1   • sucralfate (CARAFATE) 1 g tablet Take 1 tablet (1 g total) by mouth 4 (four) times a day (before meals and at bedtime) 60 tablet 0   • albuterol (ProAir HFA) 90 mcg/act inhaler Inhale 2 puffs every 6 (six) hours as needed for wheezing (Patient not taking: Reported on 2/10/2023) 8 5 g 0   • pantoprazole (PROTONIX) 40 mg tablet TAKE 1 TABLET BY MOUTH EVERY DAY (Patient not taking: Reported on 1/26/2023) 90 tablet 1     No current facility-administered medications for this visit  Physical Exam  Vitals reviewed  Constitutional:       Appearance: She is well-developed  Cardiovascular:      Rate and Rhythm: Normal rate and regular rhythm  Heart sounds: Normal heart sounds  Pulmonary:      Effort: Pulmonary effort is normal       Breath sounds: Normal breath sounds  Abdominal:      General: Bowel sounds are normal       Palpations: Abdomen is soft  Tenderness: There is abdominal tenderness in the epigastric area  Skin:     General: Skin is warm and dry  Neurological:      Mental Status: She is alert and oriented to person, place, and time  Psychiatric:         Behavior: Behavior normal          Thought Content:  Thought content normal          Judgment: Judgment normal                      SHRAVAN Johnson

## 2023-02-16 ENCOUNTER — OFFICE VISIT (OUTPATIENT)
Dept: GASTROENTEROLOGY | Facility: CLINIC | Age: 68
End: 2023-02-16

## 2023-02-16 VITALS
BODY MASS INDEX: 27.32 KG/M2 | HEART RATE: 86 BPM | DIASTOLIC BLOOD PRESSURE: 98 MMHG | HEIGHT: 65 IN | SYSTOLIC BLOOD PRESSURE: 139 MMHG | WEIGHT: 164 LBS

## 2023-02-16 DIAGNOSIS — K21.9 GASTROESOPHAGEAL REFLUX DISEASE, UNSPECIFIED WHETHER ESOPHAGITIS PRESENT: Primary | ICD-10-CM

## 2023-02-16 DIAGNOSIS — Z86.010 HISTORY OF COLON POLYPS: ICD-10-CM

## 2023-02-16 DIAGNOSIS — K59.00 CONSTIPATION, UNSPECIFIED CONSTIPATION TYPE: ICD-10-CM

## 2023-02-16 DIAGNOSIS — R11.2 NAUSEA AND VOMITING, UNSPECIFIED VOMITING TYPE: ICD-10-CM

## 2023-02-16 DIAGNOSIS — K52.9 ENTERITIS: ICD-10-CM

## 2023-02-16 NOTE — PROGRESS NOTES
[facility] - Outpatient Follow-up Note  So Davalos 78 y o  female MRN: [id number]  Encounter: [id number]          ASSESSMENT AND PLAN:      1  Gastroesophageal reflux disease, unspecified whether esophagitis present  Patient with history of GERD, continue Nexium for chronic reflux symptoms, did have a EGD in 2020 and no need to repeat EGD at this time unless persistent symptoms but she states that she thinks she is doing better on Nexium  2  Nausea and vomiting, unspecified vomiting type  Suspect that symptoms are related to acute gastroenteritis a CAT scan as below did mention possibility of Crohn's but history was acute onset of nausea and vomiting diarrheal symptoms seems more consistent with infectious gastroenteritis but will order CRP and fecal calprotectin and if either of these are elevated then may consider doing CT or MR enterography for further evaluation  Patient does report 2 small bowel obstructions which were reportedly secondary to adhesive disease about 20 years ago  Would recommend to continue low residue, lactose restricted diet at this time for the next week or so  3  Constipation, unspecified constipation type  Patient with constipation which is likely related to poor p o  intake and she does generally have constipation in general but may have mild ileus status post gastroenteritis so I advised her she can try taking MiraLAX    4  History of colon polyps  Colonoscopy will be due in 5 years for follow-up and is up-to-date as colonoscopy was just done in July of last year    Patient did have some tenderness throughout the abdomen on examination today especially in left lower quadrant but she states that she believes this is improving since last week also had profuse nausea and vomiting which could be musculoskeletal   Advised her If any severe pain fevers or chills in the interim, then she should contact us    Otherwise we will see in 2 months for follow-up but did advise if any persistent issues with nausea and vomiting upper endoscopy may need to be repeated as well     ______________________________________________________________________    SUBJECTIVE: This is a 29-year-old female who presents today for evaluation from emergency room  Pt has not moved her bowels in 8 days and had a CAT scan performed  Showed findings suggestive of enteritis involving a few distal small bowel loops in the midline lower abdomen  She also was noted to have mild associated mesenteric vascular engorgement as well as prominence of the mesenteric fat which could be seen in the setting of Crohn's disease correlate with history  Patient was recently seen in the office  Had colonoscopy in July 2022 and had one 7 mm sessile polyp with few small mild scattered diverticula in transverse descending and sigmoid colon  Was noted to have tubular adenomatous colon polyp, repeat colon due in 5 years  EGD was last performed in November 2020, showed possible short segment Colunga's and mild gastritis  Biopsies at that time were negative for H  pylori as well as celiac disease with no evidence of Colunga's esophagus,  She otherwise reports acute onset of nausea and vomiting last Monday into Tuesday was having abdominal pain and went to ER on Wednesday  Patient did have diarrhea on Wednesday but then has not moved her bowels since then  Patient reports that she had 2 bowel obstructions in the past and was thought to be secondary adhesive disease that she required NG tube decompression and surgery for lysis of adhesions she states last episode was about 20 years ago  Nausea and vomiting has since resolved  She is able to eat and she is eating a regular diet but she states that she is eating less  Denies any significant difficulty swallowing and does generally have constipation by history but was taking magnesium supplement which was helping      REVIEW OF SYSTEMS IS OTHERWISE NEGATIVE  Historical Information   Past Medical History:   Diagnosis Date   • Abdominal adhesions    • Anesthesia complication     difficult to wake up   • Anxiety    • Arthritis    • Cancer (HCC)     melanoma   • Colon polyp    • Depression    • Depression    • Disease of thyroid gland    • Fibromyalgia    • Fibromyalgia, primary    • Fractures 2006   • GERD (gastroesophageal reflux disease)    • History of cholecystectomy 09/07/2019   • Hyperlipidemia    • Irregular heart beat     can be tachy; also has orthoststic hypotension   • Lazy eye     resolved: 3/27/17   • Medical clearance for psychiatric admission 07/13/2021   • Melanoma ([address] ) 2010   • Osteoarthritis 07/2018   • Osteopenia     with joint pain-elevated DEV   • Psychiatric disorder    • Shortness of breath     assoc with tachycardia   • Stomach disorder 1995   • Tinnitus    • Wears glasses     for reading     Past Surgical History:   Procedure Laterality Date   • ABDOMINAL SURGERY      lysis of adhesions x 2   • APPENDECTOMY     • CERVICAL FUSION      May 5,2021 and Jan 01,2020   • CHOLECYSTECTOMY      open   • COLONOSCOPY     • DILATION AND CURETTAGE OF UTERUS     • FOOT SURGERY  05/2017   • NECK SURGERY  01/2019 5/2021   • NEUROMA EXCISION Right 05/26/2017    Procedure: EXCISION MASS / FIBROMA FOOT;  Surgeon: Dagoberto Prieto DPM;  Location: [address] [place] [address];  Service:    • PARS PLANA VITRECTOMY W/ REPAIR OF MACULAR HOLE  12/23/2020   • MI XCAPSL CTRC RMVL INSJ IO LENS PROSTH W/O ECP Left 12/06/2021    Procedure: EXTRACTION EXTRACAPSULAR CATARACT PHACO INTRAOCULAR LENS (IOL);   Surgeon: Jarrett Casillas MD;  Location: [facility] MAIN OR;  Service: Ophthalmology   • RIGHT OOPHORECTOMY     • WISDOM TOOTH EXTRACTION      x4     Social History   Social History     Substance and Sexual Activity   Alcohol Use Not Currently     Social History     Substance and Sexual Activity   Drug Use Never     Social History     Tobacco Use   Smoking Status Never   Smokeless Tobacco Never     Family History   Problem Relation Age of Onset   • Heart disease Mother    • Stroke Mother 58   • COPD Mother    • Arthritis Mother    • Hypertension Father    • Kidney disease Brother         kidney transplant   • Multiple sclerosis Sister    • No Known Problems Maternal Aunt    • No Known Problems Maternal Uncle    • No Known Problems Paternal Aunt    • No Known Problems Paternal Uncle    • No Known Problems Maternal Grandmother    • No Known Problems Maternal Grandfather    • No Known Problems Paternal Grandmother    • No Known Problems Paternal Grandfather    • Dislocations Sister    • Neurological problems Sister    • Scoliosis Sister    • ADD / ADHD Neg Hx    • Anesthesia problems Neg Hx    • Cancer Neg Hx    • Clotting disorder Neg Hx    • Collagen disease Neg Hx    • Diabetes Neg Hx    • Dislocations Neg Hx    • Learning disabilities Neg Hx    • Neurological problems Neg Hx    • Osteoporosis Neg Hx    • Rheumatologic disease Neg Hx    • Scoliosis Neg Hx    • Vascular Disease Neg Hx        Meds/Allergies       Current Outpatient Medications:   •  acetaminophen (TYLENOL) 650 mg CR tablet  •  albuterol (ProAir HFA) 90 mcg/act inhaler  •  albuterol (ProAir HFA) 90 mcg/act inhaler  •  atorvastatin (LIPITOR) 20 mg tablet  •  benzonatate (TESSALON) 200 MG capsule  •  calcium carbonate (OS-WILLY) 1250 (500 Ca) MG tablet  •  [facility] Aspirin Adult Low Strength 81 MG EC tablet  •  dicyclomine (BENTYL) 20 mg tablet  •  DULoxetine (CYMBALTA) 60 mg delayed release capsule  •  esomeprazole (NexIUM) 40 MG capsule  •  fluticasone (Flovent HFA) 110 MCG/ACT inhaler  •  gabapentin (NEURONTIN) 300 mg capsule  •  levothyroxine 50 mcg tablet  •  Magnesium 400 MG TABS  •  midodrine (PROAMATINE) 10 MG tablet  •  QUEtiapine (SEROquel) 100 mg tablet  •  sucralfate (CARAFATE) 1 g tablet  •  ondansetron (ZOFRAN-ODT) 4 mg disintegrating tablet  •  pantoprazole (PROTONIX) 40 mg tablet    Allergies   Allergen Reactions   • Meperidine Lightheadedness and Other (See Comments)   • Sulfa Antibiotics Hives           Objective     Blood pressure 139/98, pulse 86, height 5' 5" (1 651 m), weight 74 4 kg (164 lb)  Body mass index is 27 29 kg/m²  PHYSICAL EXAM:      General Appearance:   Alert, cooperative, no distress   HEENT:   Normocephalic, atraumatic, anicteric      Neck:  Supple, symmetrical, trachea midline   Lungs:   Clear to auscultation bilaterally; no rales, rhonchi or wheezing; respirations unlabored    Heart[de-identified]   Regular rate and rhythm; no murmur, rub, or gallop  Abdomen:   Soft, non-tender, non-distended; normal bowel sounds; no masses, no organomegaly    Genitalia:   Deferred    Rectal:   Deferred    Extremities:  No cyanosis, clubbing or edema    Pulses:  2+ and symmetric    Skin:  No jaundice, rashes, or lesions    Lymph nodes:  No palpable cervical lymphadenopathy        Lab Results:   No visits with results within 1 Day(s) from this visit     Latest known visit with results is:   Admission on 02/08/2023, Discharged on 02/08/2023   Component Date Value   • WBC 02/08/2023 6 20    • RBC 02/08/2023 5 28 (H)    • Hemoglobin 02/08/2023 15 2    • Hematocrit 02/08/2023 45 9    • MCV 02/08/2023 87    • MCH 02/08/2023 28 8    • MCHC 02/08/2023 33 1    • RDW 02/08/2023 13 8    • MPV 02/08/2023 11 0    • Platelets 82/60/6934 255    • nRBC 02/08/2023 0    • Neutrophils Relative 02/08/2023 73    • Immat GRANS % 02/08/2023 0    • Lymphocytes Relative 02/08/2023 15    • Monocytes Relative 02/08/2023 11    • Eosinophils Relative 02/08/2023 1    • Basophils Relative 02/08/2023 0    • Neutrophils Absolute 02/08/2023 4 55    • Immature Grans Absolute 02/08/2023 0 02    • Lymphocytes Absolute 02/08/2023 0 90    • Monocytes Absolute 02/08/2023 0 68    • Eosinophils Absolute 02/08/2023 0 03    • Basophils Absolute 02/08/2023 0 02    • Sodium 02/08/2023 135    • Potassium 02/08/2023 4 4    • Chloride 02/08/2023 101    • CO2 02/08/2023 24    • ANION GAP 02/08/2023 10    • BUN 02/08/2023 19    • Creatinine 02/08/2023 0 82    • Glucose 02/08/2023 115    • Calcium 02/08/2023 9 4    • AST 02/08/2023 62 (H)    • ALT 02/08/2023 48    • Alkaline Phosphatase 02/08/2023 88    • Total Protein 02/08/2023 8 3    • Albumin 02/08/2023 3 8    • Total Bilirubin 02/08/2023 0 63    • eGFR 02/08/2023 74    • Magnesium 02/08/2023 2 3    • Lipase 02/08/2023 50 (L)          Radiology Results:   XR chest pa & lateral    Result Date: 1/28/2023  Narrative: CHEST INDICATION:   R04 2: Hemoptysis R06 2: Wheezing R04 2: Hemoptysis  Cough  COMPARISON:  CXR 11/24/2022 and 7/18/2020  EXAM PERFORMED/VIEWS:  XR CHEST PA & LATERAL FINDINGS: Cardiomediastinal silhouette appears unremarkable  The lungs are clear  No pneumothorax or pleural effusion  Loop recorder in the left chest wall  Osseous structures appear within normal limits for patient age  Cervical spine surgery  Cholecystectomy  Impression: No acute cardiopulmonary disease  Workstation performed: [id number]     CT abdomen pelvis with contrast    Result Date: 2/8/2023  Narrative: CT ABDOMEN AND PELVIS WITH IV CONTRAST INDICATION:   Abdominal pain, acute, nonlocalized abdominal pain  COMPARISON:  CT abdomen/pelvis dated 7/17/2022  TECHNIQUE:  CT examination of the abdomen and pelvis was performed  Axial, sagittal, and coronal 2D reformatted images were created from the source data and submitted for interpretation  Radiation dose length product (DLP) for this visit:  488 99 mGy-cm   This examination, like all CT scans performed in the [facility], was performed utilizing techniques to minimize radiation dose exposure, including the use of iterative  reconstruction and automated exposure control  IV Contrast:  100 mL of iohexol (OMNIPAQUE) Enteric Contrast:  Enteric contrast was not administered   FINDINGS: ABDOMEN LOWER CHEST:  No clinically significant abnormality identified in the visualized lower chest  LIVER/BILIARY TREE:  Unremarkable  GALLBLADDER:  No calcified gallstones  No pericholecystic inflammatory change  SPLEEN:  Unremarkable  PANCREAS:  Unremarkable  ADRENAL GLANDS:  Unremarkable  KIDNEYS/URETERS:  Unremarkable  No hydronephrosis  STOMACH AND BOWEL:  Lack of oral contrast limits evaluation of the gastrointestinal tract  There is suggestion of mild bowel wall thickening and edema of few distal small bowel loops in the midline lower abdomen (images 122 2/1/2025, series 2 and 46-59, series 601) consistent with enteritis  Mild associated mesenteric vascular engorgement as well as prominence of the mesenteric fat is noted  No evidence for bowel obstruction  A duodenal diverticulum is noted  APPENDIX:  No findings to suggest appendicitis  ABDOMINOPELVIC CAVITY:  No ascites  No pneumoperitoneum  No lymphadenopathy  VESSELS:  Unremarkable for patient's age  PELVIS REPRODUCTIVE ORGANS:  Unremarkable for patient's age  URINARY BLADDER:  Unremarkable  ABDOMINAL WALL/INGUINAL REGIONS:  Unremarkable  OSSEOUS STRUCTURES:  No acute fracture or destructive osseous lesion  There is degenerative disc disease at L3-L4 and L4-L5  Impression: Findings suggestive of enteritis involving the few distal small bowel loops in the midline lower abdomen  Mild associated mesenteric vascular engorgement as well as prominence of the mesenteric fat is noted; this can be seen in the setting of Crohn's disease; correlate with history  The study was marked in [facility] for immediate notification   Workstation performed: [id number]

## 2023-02-20 DIAGNOSIS — R10.816 EPIGASTRIC ABDOMINAL TENDERNESS WITHOUT REBOUND TENDERNESS: ICD-10-CM

## 2023-02-20 RX ORDER — SUCRALFATE 1 G/1
TABLET ORAL
Qty: 60 TABLET | Refills: 3 | Status: SHIPPED | OUTPATIENT
Start: 2023-02-20

## 2023-02-24 ENCOUNTER — OFFICE VISIT (OUTPATIENT)
Dept: OBGYN CLINIC | Facility: CLINIC | Age: 68
End: 2023-02-24

## 2023-02-24 VITALS
TEMPERATURE: 98 F | SYSTOLIC BLOOD PRESSURE: 132 MMHG | WEIGHT: 165 LBS | HEIGHT: 65 IN | HEART RATE: 94 BPM | BODY MASS INDEX: 27.49 KG/M2 | DIASTOLIC BLOOD PRESSURE: 84 MMHG

## 2023-02-24 DIAGNOSIS — G89.29 CHRONIC PAIN OF RIGHT KNEE: ICD-10-CM

## 2023-02-24 DIAGNOSIS — M17.11 PRIMARY OSTEOARTHRITIS OF RIGHT KNEE: Primary | ICD-10-CM

## 2023-02-24 DIAGNOSIS — M25.561 CHRONIC PAIN OF RIGHT KNEE: ICD-10-CM

## 2023-02-24 RX ORDER — BUPIVACAINE HYDROCHLORIDE 5 MG/ML
6 INJECTION, SOLUTION EPIDURAL; INTRACAUDAL
Status: COMPLETED | OUTPATIENT
Start: 2023-02-24 | End: 2023-02-24

## 2023-02-24 RX ORDER — METOPROLOL SUCCINATE 25 MG/1
25 TABLET, EXTENDED RELEASE ORAL DAILY
COMMUNITY
Start: 2023-02-14

## 2023-02-24 RX ORDER — TRIAMCINOLONE ACETONIDE 40 MG/ML
80 INJECTION, SUSPENSION INTRA-ARTICULAR; INTRAMUSCULAR
Status: COMPLETED | OUTPATIENT
Start: 2023-02-24 | End: 2023-02-24

## 2023-02-24 RX ADMIN — TRIAMCINOLONE ACETONIDE 80 MG: 40 INJECTION, SUSPENSION INTRA-ARTICULAR; INTRAMUSCULAR at 14:31

## 2023-02-24 RX ADMIN — BUPIVACAINE HYDROCHLORIDE 6 ML: 5 INJECTION, SOLUTION EPIDURAL; INTRACAUDAL at 14:31

## 2023-02-24 NOTE — PROGRESS NOTES
Assessment/Plan:  1  Primary osteoarthritis of right knee  Large joint arthrocentesis: R knee      2  Chronic pain of right knee          Scribe Attestation    I,:  Giacomo Escobedo am acting as a scribe while in the presence of the attending physician :       I,:  Ann Martinez, DO personally performed the services described in this documentation    as scribed in my presence :         Shaggy Maynard is a 78 y o  female who presents with chronic right knee osteoarthritis  She received long, lasting relief from injection therapy previously  I offered to repeat this treatment today to alleviate her pain and discomfort  Patient consented and underwent the procedure without issues or complications  Post injection instructions provided  Discussed this treatment can be repeated no sooner than 3 months from today  Large joint arthrocentesis: R knee  Universal Protocol:  Consent: Verbal consent obtained  Written consent not obtained  Risks and benefits: risks, benefits and alternatives were discussed  Consent given by: patient  Patient understanding: patient states understanding of the procedure being performed  Site marked: the operative site was marked  Patient identity confirmed: verbally with patient    Supporting Documentation  Indications: pain   Procedure Details  Location: knee - R knee  Preparation: Patient was prepped and draped in the usual sterile fashion  Needle size: 20 G  Ultrasound guidance: no  Approach: anterolateral  Medications administered: 6 mL bupivacaine (PF) 0 5 %; 80 mg triamcinolone acetonide 40 mg/mL    Patient tolerance: patient tolerated the procedure well with no immediate complications  Dressing:  Sterile dressing applied          Subjective: right knee pain     Patient ID: Shaggy Maynard is a 78 y o  female presents today for follow up evaluation of her right knee and right sided ITB syndrome  She was last seen on 10/13/22 and received a corticosteroid injection at that time   She reports this provided her with significant relief until approximately 1 week ago when her symptoms returned  She denies any new injury or trauma  She reports icreased pain with stairs  Tylenol is her main pain medication at this time  She did use Voltaren gel for her right sided ITB syndrome  She notes this did not provide her with relief  Review of Systems   Constitutional: Positive for activity change  Negative for chills, fever and unexpected weight change  HENT: Negative for hearing loss, nosebleeds and sore throat  Eyes: Negative for pain, redness and visual disturbance  Respiratory: Negative for cough, shortness of breath and wheezing  Cardiovascular: Negative for chest pain, palpitations and leg swelling  Gastrointestinal: Negative for abdominal pain, nausea and vomiting  Endocrine: Negative for polydipsia and polyuria  Genitourinary: Negative for dysuria and hematuria  Musculoskeletal: Positive for arthralgias and myalgias  See HPI   Skin: Negative for rash and wound  Neurological: Negative for dizziness, numbness and headaches  Psychiatric/Behavioral: Negative for decreased concentration and suicidal ideas  The patient is not nervous/anxious            Past Medical History:   Diagnosis Date   • Abdominal adhesions    • Anesthesia complication     difficult to wake up   • Anxiety    • Arthritis    • Cancer (HCC)     melanoma   • Colon polyp    • Depression    • Depression    • Disease of thyroid gland    • Fibromyalgia    • Fibromyalgia, primary    • Fractures 2006   • GERD (gastroesophageal reflux disease)    • History of cholecystectomy 09/07/2019   • Hyperlipidemia    • Irregular heart beat     can be tachy; also has orthoststic hypotension   • Lazy eye     resolved: 3/27/17   • Medical clearance for psychiatric admission 07/13/2021   • Melanoma ([facility] [address] ) 2010   • Osteoarthritis 07/2018   • Osteopenia     with joint pain-elevated DEV   • Psychiatric disorder    • Shortness of breath assoc with tachycardia   • Stomach disorder 1995   • Tinnitus    • Wears glasses     for reading       Past Surgical History:   Procedure Laterality Date   • ABDOMINAL SURGERY      lysis of adhesions x 2   • APPENDECTOMY     • CERVICAL FUSION      May 5,2021 and Jan 01,2020   • CHOLECYSTECTOMY      open   • COLONOSCOPY     • DILATION AND CURETTAGE OF UTERUS     • FOOT SURGERY  05/2017   • NECK SURGERY  01/2019 5/2021   • NEUROMA EXCISION Right 05/26/2017    Procedure: EXCISION MASS / FIBROMA FOOT;  Surgeon: Jennifer Patel DPM;  Location: [address] [place] [address];  Service:    • PARS PLANA VITRECTOMY W/ REPAIR OF MACULAR HOLE  12/23/2020   • KY XCAPSL CTRC RMVL INSJ IO LENS PROSTH W/O ECP Left 12/06/2021    Procedure: EXTRACTION EXTRACAPSULAR CATARACT PHACO INTRAOCULAR LENS (IOL);   Surgeon: Lanie Cho MD;  Location: [facility] MAIN OR;  Service: Ophthalmology   • RIGHT OOPHORECTOMY     • WISDOM TOOTH EXTRACTION      x4       Family History   Problem Relation Age of Onset   • Heart disease Mother    • Stroke Mother 58   • COPD Mother    • Arthritis Mother    • Hypertension Father    • Kidney disease Brother         kidney transplant   • Multiple sclerosis Sister    • No Known Problems Maternal Aunt    • No Known Problems Maternal Uncle    • No Known Problems Paternal Aunt    • No Known Problems Paternal Uncle    • No Known Problems Maternal Grandmother    • No Known Problems Maternal Grandfather    • No Known Problems Paternal Grandmother    • No Known Problems Paternal Grandfather    • Dislocations Sister    • Neurological problems Sister    • Scoliosis Sister    • ADD / ADHD Neg Hx    • Anesthesia problems Neg Hx    • Cancer Neg Hx    • Clotting disorder Neg Hx    • Collagen disease Neg Hx    • Diabetes Neg Hx    • Dislocations Neg Hx    • Learning disabilities Neg Hx    • Neurological problems Neg Hx    • Osteoporosis Neg Hx    • Rheumatologic disease Neg Hx    • Scoliosis Neg Hx    • Vascular Disease Neg Hx        Social History     Occupational History   • Not on file   Tobacco Use   • Smoking status: Never   • Smokeless tobacco: Never   Vaping Use   • Vaping Use: Never used   Substance and Sexual Activity   • Alcohol use: Not Currently   • Drug use: No   • Sexual activity: Not Currently     Partners: Male         Current Outpatient Medications:   •  acetaminophen (TYLENOL) 650 mg CR tablet, if needed, Disp: , Rfl:   •  albuterol (ProAir HFA) 90 mcg/act inhaler, Inhale 2 puffs every 6 (six) hours as needed for wheezing, Disp: 8 5 g, Rfl: 0  •  albuterol (ProAir HFA) 90 mcg/act inhaler, Inhale 2 puffs every 6 (six) hours as needed for wheezing, Disp: 18 g, Rfl: 2  •  atorvastatin (LIPITOR) 20 mg tablet, TAKE 1 TABLET BY MOUTH EVERY DAY, Disp: 90 tablet, Rfl: 2  •  benzonatate (TESSALON) 200 MG capsule, Take 1 capsule (200 mg total) by mouth 3 (three) times a day as needed for cough, Disp: 30 capsule, Rfl: 0  •  calcium carbonate (OS-WILLY) 1250 (500 Ca) MG tablet, Take 1,250 mg by mouth, Disp: , Rfl:   •  [employer] Aspirin Adult Low Strength 81 MG EC tablet, CHEW AND SWALLOW 1 TABLET BY MOUTH ONCE DAILY, Disp: , Rfl:   •  dicyclomine (BENTYL) 20 mg tablet, Take 1 tablet (20 mg total) by mouth 2 (two) times a day, Disp: 20 tablet, Rfl: 0  •  DULoxetine (CYMBALTA) 60 mg delayed release capsule, Take 1 capsule (60 mg total) by mouth daily, Disp: 90 capsule, Rfl: 1  •  esomeprazole (NexIUM) 40 MG capsule, Take 1 capsule (40 mg total) by mouth daily before breakfast, Disp: 90 capsule, Rfl: 5  •  fluticasone (Flovent HFA) 110 MCG/ACT inhaler, Inhale 1 puff 2 (two) times a day Rinse mouth after use , Disp: 12 g, Rfl: 0  •  gabapentin (NEURONTIN) 300 mg capsule, Take 1 capsule (300 mg total) by mouth 3 (three) times a day, Disp: 90 capsule, Rfl: 5  •  levothyroxine 50 mcg tablet, TAKE 1 TABLET BY MOUTH EVERY DAY, Disp: 90 tablet, Rfl: 0  •  Magnesium 400 MG TABS, Take by mouth daily, Disp: , Rfl:   •  metoprolol succinate (TOPROL-XL) 25 mg 24 hr tablet, Take 25 mg by mouth daily, Disp: , Rfl:   •  midodrine (PROAMATINE) 10 MG tablet, Take 1 tablet (10 mg total) by mouth 3 (three) times a day (Patient taking differently: Take 10 mg by mouth 2 (two) times a day), Disp: 270 tablet, Rfl: 0  •  ondansetron (ZOFRAN-ODT) 4 mg disintegrating tablet, Take 1 tablet (4 mg total) by mouth every 6 (six) hours as needed for nausea for up to 3 days, Disp: 9 tablet, Rfl: 0  •  QUEtiapine (SEROquel) 100 mg tablet, Take 1 tablet (100 mg total) by mouth daily at bedtime, Disp: 90 tablet, Rfl: 1  •  sucralfate (CARAFATE) 1 g tablet, TAKE 1 TABLET (1 G TOTAL) BY MOUTH 4 TIMES A DAY BEFORE MEALS AND AT BEDTIME, Disp: 60 tablet, Rfl: 3    Allergies   Allergen Reactions   • Meperidine Lightheadedness and Other (See Comments)   • Sulfa Antibiotics Hives       Objective:  Vitals:    02/24/23 1355   BP: 132/84   Pulse: 94   Temp: 98 °F (36 7 °C)       Body mass index is 27 46 kg/m²  Right Knee Exam     Tenderness   The patient is experiencing tenderness in the medial joint line (LCL)  Range of Motion   The patient has normal right knee ROM  Extension: 0   Right knee flexion: 125  Tests   Varus: negative Valgus: negative  Patellar apprehension: negative    Other   Erythema: absent  Scars: absent    Comments:  Stable to varus valgus stress at 0° 30° 90°  Tender to palpation over the distal IT band  Positive grind test   Parapatellar crepitus with motion  Full flexion and extension   No erythema ecchymosis or warmth  Baker's cyst present             Physical Exam  Vitals and nursing note reviewed  Constitutional:       Appearance: Normal appearance  She is well-developed  HENT:      Head: Normocephalic and atraumatic  Right Ear: External ear normal       Left Ear: External ear normal    Eyes:      General: No scleral icterus  Extraocular Movements: Extraocular movements intact        Conjunctiva/sclera: Conjunctivae normal    Cardiovascular:      Rate and Rhythm: Normal rate  Pulmonary:      Effort: Pulmonary effort is normal  No respiratory distress  Musculoskeletal:      Cervical back: Normal range of motion and neck supple  Comments: See Ortho exam   Skin:     General: Skin is warm and dry  Neurological:      Mental Status: She is alert and oriented to person, place, and time  Psychiatric:         Behavior: Behavior normal            I have personally reviewed pertinent films in PACS  No new images to review at today's appointment  This document was created using speech voice recognition software  Grammatical errors, random word insertions, pronoun errors, and incomplete sentences are an occasional consequence of this system due to software limitations, ambient noise, and hardware issues  Any formal questions or concerns about content, text, or information contained within the body of this dictation should be directly addressed to the provider for clarification

## 2023-03-07 DIAGNOSIS — R10.816 EPIGASTRIC ABDOMINAL TENDERNESS WITHOUT REBOUND TENDERNESS: ICD-10-CM

## 2023-03-08 RX ORDER — SUCRALFATE 1 G/1
TABLET ORAL
Qty: 360 TABLET | Refills: 1 | Status: SHIPPED | OUTPATIENT
Start: 2023-03-08

## 2023-03-23 NOTE — ED NOTES
Patient transported to [address] [place] [address] Avenue, RN  09/28/19 5600 Patient/Caregiver provided printed discharge information.

## 2023-03-25 DIAGNOSIS — R04.2 COUGH WITH HEMOPTYSIS: ICD-10-CM

## 2023-03-25 DIAGNOSIS — R04.2 SPUTUM BLOODY: ICD-10-CM

## 2023-03-25 DIAGNOSIS — R06.2 WHEEZING: ICD-10-CM

## 2023-03-25 RX ORDER — FLUTICASONE PROPIONATE 110 UG/1
AEROSOL, METERED RESPIRATORY (INHALATION)
Status: CANCELLED | OUTPATIENT
Start: 2023-03-25

## 2023-03-27 RX ORDER — FLUTICASONE PROPIONATE 100 MCG
1 BLISTER, WITH INHALATION DEVICE INHALATION 2 TIMES DAILY
Qty: 60 EACH | Refills: 5 | Status: SHIPPED | OUTPATIENT
Start: 2023-03-27

## 2023-03-28 ENCOUNTER — OFFICE VISIT (OUTPATIENT)
Dept: PSYCHIATRY | Facility: CLINIC | Age: 68
End: 2023-03-28

## 2023-03-28 DIAGNOSIS — F10.21 ALCOHOL DEPENDENCE IN REMISSION (HCC): Primary | Chronic | ICD-10-CM

## 2023-03-28 DIAGNOSIS — F32.A ANXIETY AND DEPRESSION: ICD-10-CM

## 2023-03-28 DIAGNOSIS — F51.01 PRIMARY INSOMNIA: ICD-10-CM

## 2023-03-28 DIAGNOSIS — F63.0 GAMBLING DISORDER, MODERATE: ICD-10-CM

## 2023-03-28 DIAGNOSIS — F33.42 RECURRENT MAJOR DEPRESSIVE DISORDER, IN FULL REMISSION (HCC): ICD-10-CM

## 2023-03-28 DIAGNOSIS — F41.9 ANXIETY AND DEPRESSION: ICD-10-CM

## 2023-03-28 DIAGNOSIS — F41.1 GENERALIZED ANXIETY DISORDER: ICD-10-CM

## 2023-03-29 ENCOUNTER — TELEPHONE (OUTPATIENT)
Dept: FAMILY MEDICINE CLINIC | Facility: CLINIC | Age: 68
End: 2023-03-29

## 2023-03-29 NOTE — TELEPHONE ENCOUNTER
----- Message from Deborah Ann, PhD sent at 3/28/2023  5:24 PM EDT -----  Dasha Salguero came for a session to check medications and her BP was 143/107 pulse 91  This was checked twice  She is asymptomatic, other than fatigue     Deborah Ann, PhD, MPH, APRN

## 2023-03-30 ENCOUNTER — OFFICE VISIT (OUTPATIENT)
Dept: FAMILY MEDICINE CLINIC | Facility: CLINIC | Age: 68
End: 2023-03-30

## 2023-03-30 VITALS
TEMPERATURE: 97.1 F | HEART RATE: 63 BPM | HEIGHT: 65 IN | SYSTOLIC BLOOD PRESSURE: 120 MMHG | BODY MASS INDEX: 27.99 KG/M2 | OXYGEN SATURATION: 98 % | RESPIRATION RATE: 16 BRPM | WEIGHT: 168 LBS | DIASTOLIC BLOOD PRESSURE: 70 MMHG

## 2023-03-30 VITALS
DIASTOLIC BLOOD PRESSURE: 80 MMHG | SYSTOLIC BLOOD PRESSURE: 134 MMHG | WEIGHT: 165 LBS | HEIGHT: 65 IN | HEART RATE: 88 BPM | BODY MASS INDEX: 27.49 KG/M2

## 2023-03-30 DIAGNOSIS — Z12.31 ENCOUNTER FOR SCREENING MAMMOGRAM FOR MALIGNANT NEOPLASM OF BREAST: ICD-10-CM

## 2023-03-30 DIAGNOSIS — R03.0 ELEVATED BP WITHOUT DIAGNOSIS OF HYPERTENSION: Primary | ICD-10-CM

## 2023-03-30 NOTE — BH TREATMENT PLAN
TREATMENT PLAN (Medication Management Only)        [facility] [employer]     Name and Date of Birth:  John Bautista 78 y o  1955     Date of Treatment Plan: October 18, 2022, March 28, 2023     Diagnosis/Diagnoses:    1  Major depressive disorder, recurrent, in partial remission ([facility] [address] )    2  Generalized anxiety disorder    3  Insomnia related to another mental disorder    4  Marital conflict          Strengths/Personal Resources for Self-Care: financial security, ability to express needs, motivation for treatment     Area/Areas of need (in own words): depression, gambling addiction, emotional insecurity  1          Long Term Goal: continue to improve control of depression, ultimate goal no hospitalization  Target date: 9/28/2023  Person/Persons responsible for completion of goal: sarah Buckley     2          Short Term Objective (s) - How will we reach this goal?:   A  Provider new recommended medication/dosage changes and/or continue medication(s):  Cymbalta, Seroquel  B  take medications as prescribed, attend scheduled appointments  C  See Katherine Lim [facility] ([address] [place] [address]) for psychotherapy   Target date: 9/28/2023  Person/Persons Responsible for Completion of Goal: sarah Buckley      Progress Towards Goals: progressing     Treatment Modality: medication management every 12 weeks     Review due 180 days from date of this plan: 9/28/2023  Expected length of service: ongoing treatment  My Physician/PA/NP and I have developed this plan together and I agree to work on the goals and objectives  I understand the treatment goals that were developed for my treatment

## 2023-03-30 NOTE — PSYCH
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, and has a new opportunity in a leadership position  She reports her moods are stable, denies gambling  Reports eating and sleeping well  Is excited to go to [place] for a week in April  ELLY-'s 7 score of 2 indicates no anxiety, PHQ-9 score of 1 indicates no depression  She has support from family and friends      HPI ROS Appetite Changes and Sleep: normal appetite and normal energy level    Review Of Systems:     Mood Anxiety and Depression none or minimal   Behavior Normal    Thought Content Normal   General Emotional Problems   Personality Normal   Other Psych Symptoms Normal   Constitutional As Noted in HPI   ENT As Noted in HPI   Cardiovascular As Noted in HPI   Respiratory As Noted in HPI   Gastrointestinal As Noted in HPI   Genitourinary As Noted in HPI   Musculoskeletal As Noted in HPI   Integumentary As Noted in HPI   Neurological As Noted in HPI   Endocrine hypothyroid   Other Symptoms Normal      Substance Abuse History:  Social History     Substance and Sexual Activity   Drug Use No       Family Psychiatric History:   Family History   Problem Relation Age of Onset   • Heart disease Mother    • Stroke Mother 58   • COPD Mother    • Arthritis Mother    • Hypertension Father    • Kidney disease Brother         kidney transplant   • Multiple sclerosis Sister    • No Known Problems Maternal Aunt    • No Known Problems Maternal Uncle    • No Known Problems Paternal Aunt    • No Known Problems Paternal Uncle    • No Known Problems Maternal Grandmother    • No Known Problems Maternal Grandfather    • No Known Problems Paternal Grandmother    • No Known Problems Paternal Grandfather    • Dislocations Sister    • Neurological problems Sister    • Scoliosis Sister    • ADD / ADHD Neg Hx    • Anesthesia problems Neg Hx    • Cancer Neg Hx    • Clotting disorder Neg Hx    • Collagen disease Neg Hx    • Diabetes Neg Hx    • Dislocations Neg Hx    • Learning disabilities Neg Hx    • Neurological problems Neg Hx    • Osteoporosis Neg Hx    • Rheumatologic disease Neg Hx    • Scoliosis Neg Hx    • Vascular Disease Neg Hx        The following portions of the patient's history were reviewed and updated as appropriate: allergies, current medications, past family history, past medical history, past social history, past surgical history and problem list     Social History     Socioeconomic History   • Marital status: /Civil Union     Spouse name: Not on file   • Number of children: Not on file   • Years of education: Not on file   • Highest education level: Not on file   Occupational History   • Not on file   Tobacco Use   • Smoking status: Never   • Smokeless tobacco: Never   Vaping Use   • Vaping Use: Never used   Substance and Sexual Activity   • Alcohol use: Not Currently   • Drug use: No   • Sexual activity: Not Currently     Partners: Male   Other Topics Concern   • Not on file   Social History Narrative   • Not on file     Social Determinants of Health     Financial Resource Strain: Not on file   Food Insecurity: Not on file   Transportation Needs: Not on file   Physical Activity: Not on file   Stress: Not on file   Social Connections: Not on file   Intimate Partner Violence: Not on file   Housing Stability: Not on file     Social History     Social History Narrative   • Not on file       Objective:       Mental status:  Appearance calm and cooperative , adequate hygiene and grooming and good eye contact    Mood anxious   Affect affect appropriate    Speech a normal rate   Thought Processes normal thought processes   Hallucinations no hallucinations present    Thought Content no delusions   Abnormal Thoughts no homicidal thoughts    Orientation  oriented to person and place and time   Remote Memory short term memory intact   Attention Span concentration intact   Intellect Appears to be of Average Intelligence   Insight Insight intact   Judgement judgment was intact   Muscle Strength Muscle strength and tone were normal   Language no difficulty naming common objects   Fund of Knowledge displays adequate knowledge of current events   Pain none   Pain Scale 0       Assessment/Plan:       Diagnoses and all orders for this visit:    Alcohol dependence in remission ([facility] )    Anxiety and depression    Generalized anxiety disorder    Gambling disorder, moderate    Primary insomnia    Recurrent major depressive disorder, in full remission ([facility] )            Treatment Recommendations- Risks Benefits      Immediate Medical/Psychiatric/Psychotherapy Treatments and Any Precautions:  reviewed medication continue with treatment plan    Risks, Benefits And Possible Side Effects Of Medications:  {PSYCH RISK, BENEFITS AND POSSIBLE SIDE EFFECTS (Optional):53364       Psychotherapy Provided: 30 min Individual psychotherapy provided     Supportive therapy  Medication evaluation  Mood assessment  Reviewed survey results    Goals discussed in session: Maintain stable mood with treatment    Treatment plan due this session not signed due to signature plan not working verbal agreement obtained

## 2023-03-30 NOTE — PROGRESS NOTES
Name: Fide Martins      : 1955      MRN: [id number]  Encounter Provider: Ricky Washburn MD  Encounter Date: 3/30/2023   Encounter department: [address] [place] [address]     1  Elevated BP without diagnosis of hypertension  Comments:  bp is well controlled and she will continue current dose of midodrine  2  Encounter for screening mammogram for malignant neoplasm of breast  -     Mammo screening bilateral w 3d & cad; Future; Expected date: 2023         Subjective      She was at her therapist's office and bp was high, was recommended to come here  She has a home cuff but has not been using  She feels well and denies chest pain or dyspnea  Review of Systems   Constitutional: Negative  Respiratory: Negative  Cardiovascular: Negative  Current Outpatient Medications on File Prior to Visit   Medication Sig   • acetaminophen (TYLENOL) 650 mg CR tablet if needed   • albuterol (ProAir HFA) 90 mcg/act inhaler Inhale 2 puffs every 6 (six) hours as needed for wheezing   • albuterol (ProAir HFA) 90 mcg/act inhaler Inhale 2 puffs every 6 (six) hours as needed for wheezing   • atorvastatin (LIPITOR) 20 mg tablet TAKE 1 TABLET BY MOUTH EVERY DAY   • benzonatate (TESSALON) 200 MG capsule Take 1 capsule (200 mg total) by mouth 3 (three) times a day as needed for cough   • calcium carbonate (OS-WILLY) 1250 (500 Ca) MG tablet Take 1,250 mg by mouth   • [employer] Aspirin Adult Low Strength 81 MG EC tablet CHEW AND SWALLOW 1 TABLET BY MOUTH ONCE DAILY   • dicyclomine (BENTYL) 20 mg tablet Take 1 tablet (20 mg total) by mouth 2 (two) times a day   • DULoxetine (CYMBALTA) 60 mg delayed release capsule Take 1 capsule (60 mg total) by mouth daily   • esomeprazole (NexIUM) 40 MG capsule Take 1 capsule (40 mg total) by mouth daily before breakfast   • fluticasone (Flovent Diskus) 100 mcg/actuation diskus inhaler Inhale 1 puff 2 (two) times a day Rinse mouth after use     • gabapentin (NEURONTIN) "300 mg capsule Take 1 capsule (300 mg total) by mouth 3 (three) times a day   • levothyroxine 50 mcg tablet TAKE 1 TABLET BY MOUTH EVERY DAY   • Magnesium 400 MG TABS Take by mouth daily   • midodrine (PROAMATINE) 10 MG tablet Take 1 tablet (10 mg total) by mouth 3 (three) times a day (Patient taking differently: Take 10 mg by mouth 2 (two) times a day)   • QUEtiapine (SEROquel) 100 mg tablet Take 1 tablet (100 mg total) by mouth daily at bedtime   • sucralfate (CARAFATE) 1 g tablet TAKE 1 TABLET (1 G TOTAL) BY MOUTH 4 TIMES A DAY BEFORE MEALS AND AT BEDTIME   • [DISCONTINUED] metoprolol succinate (TOPROL-XL) 25 mg 24 hr tablet Take 25 mg by mouth daily   • [DISCONTINUED] ondansetron (ZOFRAN-ODT) 4 mg disintegrating tablet Take 1 tablet (4 mg total) by mouth every 6 (six) hours as needed for nausea for up to 3 days (Patient not taking: Reported on 3/30/2023)       Objective     /70   Pulse 63   Temp (!) 97 1 °F (36 2 °C)   Resp 16   Ht 5' 5\" (1 651 m)   Wt 76 2 kg (168 lb)   LMP  (LMP Unknown)   SpO2 98%   BMI 27 96 kg/m²     Physical Exam  Constitutional:       Appearance: She is well-developed  Eyes:      Conjunctiva/sclera: Conjunctivae normal    Neck:      Thyroid: No thyromegaly  Vascular: No JVD  Cardiovascular:      Rate and Rhythm: Normal rate and regular rhythm  Heart sounds: Normal heart sounds  No murmur heard  No friction rub  No gallop  Pulmonary:      Effort: Pulmonary effort is normal       Breath sounds: Normal breath sounds  No wheezing or rales  Musculoskeletal:      Cervical back: Neck supple         Michael Mcclure MD  "

## 2023-04-04 ENCOUNTER — TELEPHONE (OUTPATIENT)
Dept: OBGYN CLINIC | Facility: HOSPITAL | Age: 68
End: 2023-04-04

## 2023-04-04 ENCOUNTER — OFFICE VISIT (OUTPATIENT)
Dept: FAMILY MEDICINE CLINIC | Facility: CLINIC | Age: 68
End: 2023-04-04

## 2023-04-04 VITALS
OXYGEN SATURATION: 99 % | BODY MASS INDEX: 27.42 KG/M2 | SYSTOLIC BLOOD PRESSURE: 138 MMHG | HEART RATE: 88 BPM | RESPIRATION RATE: 18 BRPM | WEIGHT: 164.6 LBS | TEMPERATURE: 97.2 F | DIASTOLIC BLOOD PRESSURE: 88 MMHG | HEIGHT: 65 IN

## 2023-04-04 DIAGNOSIS — J20.9 ACUTE BRONCHITIS, UNSPECIFIED ORGANISM: Primary | ICD-10-CM

## 2023-04-04 DIAGNOSIS — F33.42 RECURRENT MAJOR DEPRESSIVE DISORDER, IN FULL REMISSION (HCC): ICD-10-CM

## 2023-04-04 DIAGNOSIS — E78.2 MIXED DYSLIPIDEMIA: ICD-10-CM

## 2023-04-04 RX ORDER — AMOXICILLIN AND CLAVULANATE POTASSIUM 875; 125 MG/1; MG/1
1 TABLET, FILM COATED ORAL EVERY 12 HOURS SCHEDULED
Qty: 20 TABLET | Refills: 0 | Status: SHIPPED | OUTPATIENT
Start: 2023-04-04 | End: 2023-04-14

## 2023-04-04 RX ORDER — METHYLPREDNISOLONE 4 MG/1
TABLET ORAL
Qty: 21 TABLET | Refills: 0 | Status: SHIPPED | OUTPATIENT
Start: 2023-04-04

## 2023-04-04 NOTE — TELEPHONE ENCOUNTER
Caller: patient    Doctor: Scott Arrieta    Reason for call: patient has extreme swelling in her knee, expressing concern because of the pain and upcoming vacation plans- is there something she can do besides an injection?  Patient will be sending a msg through [employer]

## 2023-04-04 NOTE — PATIENT INSTRUCTIONS
Amoxicillin/Clavulanate Potassium (By mouth)   Amoxicillin (z-rkf-c-PERRY-in), Clavulanate Potassium (OBSZ-xx-wq-keila lqu-NGH-gb-um)  Treats infections  This medicine is a penicillin antibiotic  Brand Name(s): Augmentin, Augmentin ES-600, Augmentin XR   There may be other brand names for this medicine  When This Medicine Should Not Be Used: This medicine is not right for everyone  Do not use it if you had an allergic reaction to amoxicillin, clavulanate, or similar medicines, or if you have a history of liver problems  How to Use This Medicine:   Liquid, Tablet, Chewable Tablet, Long Acting Tablet  Your doctor will tell you how much medicine to use  Do not use more than directed  You may take this medicine with or without food  However, it is best to take this medicine at the start of a meal or snack to avoid an upset stomach  Chewable tablets: Chew the tablet completely before you swallow it  If you or your child are unable to swallow the tablets whole, you may use the oral liquid  Measure the oral liquid medicine with a marked measuring spoon, oral syringe, or medicine cup  Shake the oral liquid before using  Rinse the spoon or dropper after each use  Swallow the extended-release tablet whole  Do not crush, break, or chew it  Take all of the medicine in your prescription to clear up your infection, even if you feel better after the first few doses  Missed dose: Take a dose as soon as you remember  If it is almost time for your next dose, wait until then and take a regular dose  Do not take extra medicine to make up for a missed dose  Tablet, extended-release tablet, chewable tablet: Store the medicine in a closed container at room temperature, away from heat, moisture, and direct light  Oral liquid: Store in the refrigerator  Do not freeze  Throw away any unused oral liquid after 10 days    Drugs and Foods to Avoid:   Ask your doctor or pharmacist before using any other medicine, including over-the-counter medicines, vitamins, and herbal products  Some medicines can affect how this medicine works  Tell your doctor if you are also using allopurinol, probenecid, birth control pills, or a blood thinner (including warfarin)  Warnings While Using This Medicine:   Tell your doctor if you are pregnant or breastfeeding, or if you have kidney disease, liver disease, or mononucleosis (mono)  This medicine may cause the following problems:  Serious skin reactions, including Serna-Brennan syndrome, toxic epidermal necrolysis, acute generalized exanthematous pustulosis (AGEP), and drug reaction with eosinophilia and systemic symptoms (DRESS)  Liver problems  Birth control pills may not work as well while you are taking this medicine  Use another form of birth control to prevent pregnancy  This medicine can cause diarrhea  Call your doctor if the diarrhea becomes severe, does not stop, or is bloody  Do not take any medicine to stop diarrhea until you have talked to your doctor  Diarrhea can occur 2 months or more after you stop taking this medicine  It may occur 2 months or more after you stop using this medicine  Tell any doctor or dentist who treats you that you are using this medicine  This medicine may affect certain medical test results  Call your doctor if your symptoms do not improve or if they get worse  The chewable tablet and oral liquid contain phenylalanine  Talk to your doctor before you use this medicine if you have phenylketonuria (PKU)  Your doctor will do lab tests at regular visits to check on the effects of this medicine  Keep all appointments  Keep all medicine out of the reach of children  Never share your medicine with anyone  Possible Side Effects While Using This Medicine:   Call your doctor right away if you notice any of these side effects:   Allergic reaction: Itching or hives, swelling in your face or hands, swelling or tingling in your mouth or throat, chest tightness, trouble breathing  Blistering, peeling, red skin rash  Bloody or watery diarrhea, stomach cramps, fever  Change in how much or how often you urinate, painful or difficult urination, lower back or side pain  Dark urine or pale stools, nausea, vomiting, loss of appetite, stomach pain, yellow eyes or skin  If you notice these less serious side effects, talk with your doctor:   Diaper rash  Mild diarrhea, nausea, vomiting  Tooth discoloration (in children)  If you notice other side effects that you think are caused by this medicine, tell your doctor  Call your doctor for medical advice about side effects  You may report side effects to FDA at 0-946-FDA-2060  © Copyright Kenn Vicente 2022 Information is for End User's use only and may not be sold, redistributed or otherwise used for commercial purposes  The above information is an  only  It is not intended as medical advice for individual conditions or treatments  Talk to your doctor, nurse or pharmacist before following any medical regimen to see if it is safe and effective for you  Methylprednisolone (By mouth)   Methylprednisolone (meth-il-pred-NIS-oh-lone)  Treats inflammation, severe allergies, flare-ups of ongoing illnesses, and many other medical problems  May also be used to decrease some symptoms of cancer  This medicine is a corticosteroid  Brand Name(s): Medrol, Medrol Dosepak, Methylpred-DP   There may be other brand names for this medicine  When This Medicine Should Not Be Used: This medicine is not right for everyone  Do not use it if you had an allergic reaction to methylprednisolone, or if you have a fungus infection  How to Use This Medicine:   Tablet  Take your medicine as directed  Your dose may need to be changed several times to find what works best for you  If you are using this medicine for an ongoing illness, your dose may need to be changed occasionally   Some people take this medicine only every other day, which helps to decrease side effects  Take your medicine in the morning, unless your doctor tells you otherwise  It is best to take this medicine with food or milk  Missed dose: Take a dose as soon as you remember  If it is almost time for your next dose, wait until then and take a regular dose  Do not take extra medicine to make up for a missed dose  Store the medicine in a closed container at room temperature, away from heat, moisture, and direct light  Drugs and Foods to Avoid:   Ask your doctor or pharmacist before using any other medicine, including over-the-counter medicines, vitamins, and herbal products  Some medicines can affect how methylprednisolone works  Tell your doctor if you are using any of the following:  Cyclosporine, ketoconazole, phenobarbital, phenytoin, rifampin, troleandomycin  Aspirin, especially in high doses  Blood thinner (including warfarin)  Diabetes medicine  This medicine may interfere with vaccines  Ask your doctor before you get a flu shot or any other vaccines  Warnings While Using This Medicine:   Tell your doctor if you are pregnant or breastfeeding, or if you have kidney disease, liver disease (including cirrhosis), adrenal gland problems, heart failure, high blood pressure, diabetes, osteoporosis, blood clotting problems, thyroid problems, mental health problems (including depression), myasthenia gravis, or stomach or bowel problems (including ulcer or diverticulitis)  Tell your doctor if you have an infection (including herpes eye infection, tuberculosis, or threadworm)  Also tell your doctor if you have a recent exposure to chickenpox or measles    This medicine may cause the following problems:  Increased risk of infection  Changes in mood or behavior  High blood pressure  Adrenal gland problems  Eye problems or changes in vision (including cataracts or glaucoma)  Bone problems (including osteoporosis)  Slow growth in children  Increased risk for cancer (including Kaposi's sarcoma)  If you use this medicine for a long time, tell your doctor about any extra stress or anxiety in your life, including other health concerns and emotional stress  Your dose might need to be changed for a short time while you have extra stress  Do not stop using this medicine suddenly  Your doctor will need to slowly decrease your dose before you stop it completely  Tell any doctor or dentist who treats you that you are using this medicine  This medicine may affect certain medical test results  Your doctor will do lab tests at regular visits to check on the effects of this medicine  Keep all appointments  Keep all medicine out of the reach of children  Never share your medicine with anyone  Possible Side Effects While Using This Medicine:   Call your doctor right away if you notice any of these side effects: Allergic reaction: Itching or hives, swelling in your face or hands, swelling or tingling in your mouth or throat, chest tightness, trouble breathing  Bone pain, decrease in height  Dark freckles, skin color changes, coldness, weakness, tiredness, weight loss  Depression, unusual thoughts, feelings, or behaviors, trouble sleeping  Fever, chills, cough, sore throat, body aches  Severe stomach pain, nausea, vomiting, or red or black stools  Skin changes or growths  Swelling in your hands, ankles, or feet, rapid weight gain  Trouble seeing, blurred vision or other changes in vision, eye pain, headache  Unusual bleeding or bruising  If you notice these less serious side effects, talk with your doctor: Increased appetite  Round, puffy face  Weight gain around your neck, upper back, breast, face, or waist  If you notice other side effects that you think are caused by this medicine, tell your doctor  Call your doctor for medical advice about side effects   You may report side effects to FDA at 9-675-FDA-2679  © Copyright Donchristopherae Colla 2022 Information is for End User's use only and may not be sold, redistributed or otherwise used for commercial purposes  The above information is an  only  It is not intended as medical advice for individual conditions or treatments  Talk to your doctor, nurse or pharmacist before following any medical regimen to see if it is safe and effective for you

## 2023-04-04 NOTE — PROGRESS NOTES
"Assessment/Plan:    1  Acute bronchitis, unspecified organism  -     amoxicillin-clavulanate (Augmentin) 875-125 mg per tablet; Take 1 tablet by mouth every 12 (twelve) hours for 10 days  -     methylPREDNISolone 4 MG tablet therapy pack; Use as directed on package    2  Recurrent major depressive disorder, in full remission [facility])  Assessment & Plan:  Managed by psychatrist      3  Mixed dyslipidemia  Assessment & Plan:  Complaint with statin and tolerating it well              Patient Instructions: Take medication with food  It is important that you take the entire course of antibiotics prescribed  May also take a probiotic of your choice to maintain healthy GI lon  Can take some probiotic and yogurt with the medication  Take prednisone with food in morning and do not take any NSAID's while taking prednisone  Supportive care discussed and advised  Advised to RTO for any worsening and no improvement  Follow up for no improvement and worsening of conditions  Patient advised and educated when to see immediate medical care  Return if symptoms worsen or fail to improve  Future Appointments   Date Time Provider Nick Hancock   4/26/2023  2:00 PM Sharyn Gonzales PA-C [facility]   5/17/2023  1:45 PM SHRAVAN Howell NEURO [facility]   5/24/2023  5:00 PM Jacinda Baeza DO ORTHO [facility]   6/9/2023  1:30 PM Abe Abrams [address]   6/26/2023 11:00 AM Carrington Oscar, PhD [facility]           Subjective:      Patient ID: Viola Mancilla is a 78 y o  female  Chief Complaint   Patient presents with   • Cough     Productive cough, greenish sputum x 4 days, L  Arellano/LPN   • chest congestion     L arellano/LPN         Vitals:  /88   Pulse 88   Temp (!) 97 2 °F (36 2 °C) (Tympanic)   Resp 18   Ht 5' 5\" (1 651 m)   Wt 74 7 kg (164 lb 9 6 oz)   LMP  (LMP Unknown)   SpO2 99%   BMI 27 39 kg/m²     HPI  Patient stated that started with wheezing " and progressed to cough and chest tightness  Denies fever, chills and sob  Complaint with medications and tolerating it well  The following portions of the patient's history were reviewed and updated as appropriate: allergies, current medications, past family history, past medical history, past social history, past surgical history and problem list       Review of Systems   Constitutional: Negative for chills, diaphoresis, fatigue, fever and unexpected weight change  HENT: Negative for congestion, dental problem, drooling, ear discharge, ear pain, facial swelling, hearing loss, mouth sores, nosebleeds, postnasal drip, rhinorrhea, sinus pressure, sinus pain, sneezing, sore throat, tinnitus, trouble swallowing and voice change  Respiratory: Positive for cough, chest tightness and wheezing  Negative for shortness of breath  Cardiovascular: Negative  Gastrointestinal: Negative for abdominal pain, constipation, diarrhea, nausea and vomiting  Musculoskeletal: Negative  Skin: Negative  Neurological: Negative for dizziness, light-headedness and headaches  Objective:    Social History     Tobacco Use   Smoking Status Never   Smokeless Tobacco Never       Allergies:    Allergies   Allergen Reactions   • Meperidine Lightheadedness and Other (See Comments)   • Sulfa Antibiotics Hives         Current Outpatient Medications   Medication Sig Dispense Refill   • acetaminophen (TYLENOL) 650 mg CR tablet if needed     • albuterol (ProAir HFA) 90 mcg/act inhaler Inhale 2 puffs every 6 (six) hours as needed for wheezing 8 5 g 0   • albuterol (ProAir HFA) 90 mcg/act inhaler Inhale 2 puffs every 6 (six) hours as needed for wheezing 18 g 2   • amoxicillin-clavulanate (Augmentin) 875-125 mg per tablet Take 1 tablet by mouth every 12 (twelve) hours for 10 days 20 tablet 0   • atorvastatin (LIPITOR) 20 mg tablet TAKE 1 TABLET BY MOUTH EVERY DAY 90 tablet 2   • benzonatate (TESSALON) 200 MG capsule Take 1 capsule (200 mg total) by mouth 3 (three) times a day as needed for cough 30 capsule 0   • calcium carbonate (OS-WILLY) 1250 (500 Ca) MG tablet Take 1,250 mg by mouth     • [facility] Aspirin Adult Low Strength 81 MG EC tablet CHEW AND SWALLOW 1 TABLET BY MOUTH ONCE DAILY     • dicyclomine (BENTYL) 20 mg tablet Take 1 tablet (20 mg total) by mouth 2 (two) times a day 20 tablet 0   • DULoxetine (CYMBALTA) 60 mg delayed release capsule Take 1 capsule (60 mg total) by mouth daily 90 capsule 1   • esomeprazole (NexIUM) 40 MG capsule Take 1 capsule (40 mg total) by mouth daily before breakfast 90 capsule 5   • fluticasone (Flovent Diskus) 100 mcg/actuation diskus inhaler Inhale 1 puff 2 (two) times a day Rinse mouth after use  60 each 5   • gabapentin (NEURONTIN) 300 mg capsule Take 1 capsule (300 mg total) by mouth 3 (three) times a day 90 capsule 5   • levothyroxine 50 mcg tablet TAKE 1 TABLET BY MOUTH EVERY DAY 90 tablet 0   • Magnesium 400 MG TABS Take by mouth daily     • methylPREDNISolone 4 MG tablet therapy pack Use as directed on package 21 tablet 0   • midodrine (PROAMATINE) 10 MG tablet Take 1 tablet (10 mg total) by mouth 3 (three) times a day (Patient taking differently: Take 10 mg by mouth 2 (two) times a day) 270 tablet 0   • QUEtiapine (SEROquel) 100 mg tablet Take 1 tablet (100 mg total) by mouth daily at bedtime 90 tablet 1   • sucralfate (CARAFATE) 1 g tablet TAKE 1 TABLET (1 G TOTAL) BY MOUTH 4 TIMES A DAY BEFORE MEALS AND AT BEDTIME 360 tablet 1     No current facility-administered medications for this visit  Physical Exam  Vitals reviewed  Constitutional:       Appearance: Normal appearance  She is well-developed  HENT:      Head: Normocephalic  Right Ear: Tympanic membrane, ear canal and external ear normal       Left Ear: Tympanic membrane, ear canal and external ear normal       Nose: Nose normal       Right Sinus: No maxillary sinus tenderness or frontal sinus tenderness        Left Sinus: No maxillary sinus tenderness or frontal sinus tenderness  Mouth/Throat:      Mouth: No oral lesions  Pharynx: No oropharyngeal exudate or posterior oropharyngeal erythema  Cardiovascular:      Rate and Rhythm: Normal rate and regular rhythm  Heart sounds: Normal heart sounds  Pulmonary:      Effort: Pulmonary effort is normal       Breath sounds: Wheezing present  Musculoskeletal:         General: Normal range of motion  Cervical back: Neck supple  Lymphadenopathy:      Cervical:      Right cervical: No superficial or posterior cervical adenopathy  Left cervical: No superficial or posterior cervical adenopathy  Skin:     General: Skin is warm and dry  Neurological:      Mental Status: She is alert and oriented to person, place, and time  Psychiatric:         Behavior: Behavior normal          Thought Content:  Thought content normal          Judgment: Judgment normal                      SHRAVAN Castro

## 2023-04-05 DIAGNOSIS — M25.561 CHRONIC PAIN OF RIGHT KNEE: ICD-10-CM

## 2023-04-05 DIAGNOSIS — G89.29 CHRONIC PAIN OF RIGHT KNEE: ICD-10-CM

## 2023-04-05 DIAGNOSIS — M17.11 PRIMARY OSTEOARTHRITIS OF RIGHT KNEE: Primary | ICD-10-CM

## 2023-04-05 RX ORDER — MELOXICAM 7.5 MG/1
7.5 TABLET ORAL DAILY
Qty: 30 TABLET | Refills: 1 | Status: SHIPPED | OUTPATIENT
Start: 2023-04-05

## 2023-04-11 NOTE — TELEPHONE ENCOUNTER
The pt will proceed with blood work, and if positive I will have her see vascular  The plan is to obtain temporal artery biopsy  04-11    139  |  100  |  7   ----------------------------<  108<H>  3.7   |  37<H>  |  0.60    Ca    8.5      11 Apr 2023 06:50  Phos  2.3     04-10  Mg     1.70     04-10    TPro  6.3  /  Alb  1.8<L>  /  TBili  0.6  /  DBili  x   /  AST  26  /  ALT  33  /  AlkPhos  68  04-11  A1C with Estimated Average Glucose Result: 5.3 % (03-26-23 @ 07:07)  A1C with Estimated Average Glucose Result: 6.0 % (02-25-23 @ 06:28)

## 2023-04-24 DIAGNOSIS — E03.8 OTHER SPECIFIED HYPOTHYROIDISM: ICD-10-CM

## 2023-04-24 RX ORDER — LEVOTHYROXINE SODIUM 0.05 MG/1
TABLET ORAL
Qty: 90 TABLET | Refills: 0 | Status: SHIPPED | OUTPATIENT
Start: 2023-04-24

## 2023-04-27 ENCOUNTER — OFFICE VISIT (OUTPATIENT)
Dept: OBGYN CLINIC | Facility: CLINIC | Age: 68
End: 2023-04-27

## 2023-04-27 VITALS
HEART RATE: 100 BPM | DIASTOLIC BLOOD PRESSURE: 80 MMHG | BODY MASS INDEX: 27.29 KG/M2 | SYSTOLIC BLOOD PRESSURE: 140 MMHG | WEIGHT: 164 LBS

## 2023-04-27 DIAGNOSIS — M25.561 CHRONIC PAIN OF RIGHT KNEE: ICD-10-CM

## 2023-04-27 DIAGNOSIS — M17.11 PRIMARY OSTEOARTHRITIS OF RIGHT KNEE: ICD-10-CM

## 2023-04-27 DIAGNOSIS — G89.29 CHRONIC PAIN OF RIGHT KNEE: ICD-10-CM

## 2023-04-27 DIAGNOSIS — M25.461 EFFUSION OF RIGHT KNEE: ICD-10-CM

## 2023-04-27 DIAGNOSIS — M23.91 INTERNAL DERANGEMENT OF RIGHT KNEE: Primary | ICD-10-CM

## 2023-04-27 RX ORDER — MELOXICAM 7.5 MG/1
7.5 TABLET ORAL DAILY
Qty: 30 TABLET | Refills: 1 | Status: SHIPPED | OUTPATIENT
Start: 2023-04-27

## 2023-04-27 NOTE — PROGRESS NOTES
Assessment/Plan:  1  Internal derangement of right knee  MRI knee right  wo contrast      2  Primary osteoarthritis of right knee  MRI knee right  wo contrast    meloxicam (MOBIC) 7 5 mg tablet      3  Chronic pain of right knee  MRI knee right  wo contrast    meloxicam (MOBIC) 7 5 mg tablet      4  Effusion of right knee          Scribe Attestation    I,:  Sandyprerna Tinoco am acting as a scribe while in the presence of the attending physician :       I,:  Padma Edwards DO personally performed the services described in this documentation    as scribed in my presence :         Chicho Squires is a pleasant 72-year-old female who returns today for follow-up evaluation of her right knee pain  Unfortunately, she did not experience lasting relief following intra-articular injection on 2/24/2023  Her pain does appear out of proportion to her degenerative findings on x-ray and she has not responded as I would have expected to conservative treatment  She does have positive findings on provocative meniscal testing on exam and I do have suspicion that she has an underlying meniscus tear contributing to her pain profile  I have placed an order for an MRI to further evaluate for this  I provided her with a refill prescription for meloxicam and she may continue using this until the MRI is obtained  She does understand that referral to my partner Dr Narcisa Bell may be appropriate given the findings for his specialty and sports medicine  All of her questions and concerns were addressed today  I will reach out to her after the MRI is completed to review the results and further delineate her plan of care  Subjective: Follow-up evaluation for right knee pain    Patient ID: Pedro Castillo is a 78 y o  female who returns today for follow-up evaluation of her right knee pain  She underwent intra-articular injection on 2/24/2023  At today's visit, she reports that her right knee pain has been worsening    She also has experienced a new onset of clicking in her knee  This has been accompanied by swelling as well  She has had difficulty with all weightbearing activity, but her pain does improve somewhat at rest   She has been using meloxicam as prescribed which does provide some benefit for her  She has exhausted her prescription  She denies any new injury or trauma  Review of Systems   Constitutional: Positive for activity change  Negative for chills, fever and unexpected weight change  HENT: Negative for hearing loss, nosebleeds and sore throat  Eyes: Negative for pain, redness and visual disturbance  Respiratory: Negative for cough, shortness of breath and wheezing  Cardiovascular: Negative for chest pain, palpitations and leg swelling  Gastrointestinal: Negative for abdominal pain, nausea and vomiting  Endocrine: Negative for polydipsia and polyuria  Genitourinary: Negative for dysuria and hematuria  Musculoskeletal: Positive for arthralgias, joint swelling and myalgias  See HPI   Skin: Negative for rash and wound  Neurological: Negative for dizziness, numbness and headaches  Psychiatric/Behavioral: Negative for decreased concentration and suicidal ideas  The patient is not nervous/anxious            Past Medical History:   Diagnosis Date   • Abdominal adhesions    • Anesthesia complication     difficult to wake up   • Anxiety    • Arthritis    • Cancer (HCC)     melanoma   • Colon polyp    • Depression    • Depression    • Disease of thyroid gland    • Fibromyalgia    • Fibromyalgia, primary    • Fractures 2006   • GERD (gastroesophageal reflux disease)    • History of cholecystectomy 09/07/2019   • Hyperlipidemia    • Irregular heart beat     can be tachy; also has orthoststic hypotension   • Lazy eye     resolved: 3/27/17   • Medical clearance for psychiatric admission 07/13/2021   • Melanoma ([facility] [address] ) 2010   • Osteoarthritis 07/2018   • Osteopenia     with joint pain-elevated DEV   • Psychiatric disorder    • Shortness of breath     assoc with tachycardia   • Stomach disorder 1995   • Tinnitus    • Wears glasses     for reading       Past Surgical History:   Procedure Laterality Date   • ABDOMINAL SURGERY      lysis of adhesions x 2   • APPENDECTOMY     • CERVICAL FUSION      May 5,2021 and Jan 01,2020   • CHOLECYSTECTOMY      open   • COLONOSCOPY     • DILATION AND CURETTAGE OF UTERUS     • FOOT SURGERY  05/2017   • NECK SURGERY  01/2019 5/2021   • NEUROMA EXCISION Right 05/26/2017    Procedure: EXCISION MASS / FIBROMA FOOT;  Surgeon: Hansa Helton DPM;  Location: [address] [place] [address];  Service:    • PARS PLANA VITRECTOMY W/ REPAIR OF MACULAR HOLE  12/23/2020   • NM XCAPSL CTRC RMVL INSJ IO LENS PROSTH W/O ECP Left 12/06/2021    Procedure: EXTRACTION EXTRACAPSULAR CATARACT PHACO INTRAOCULAR LENS (IOL);   Surgeon: Roxanne Flores MD;  Location: [facility] MAIN OR;  Service: Ophthalmology   • RIGHT OOPHORECTOMY     • WISDOM TOOTH EXTRACTION      x4       Family History   Problem Relation Age of Onset   • Heart disease Mother    • Stroke Mother 58   • COPD Mother    • Arthritis Mother    • Hypertension Father    • Kidney disease Brother         kidney transplant   • Multiple sclerosis Sister    • No Known Problems Maternal Aunt    • No Known Problems Maternal Uncle    • No Known Problems Paternal Aunt    • No Known Problems Paternal Uncle    • No Known Problems Maternal Grandmother    • No Known Problems Maternal Grandfather    • No Known Problems Paternal Grandmother    • No Known Problems Paternal Grandfather    • Dislocations Sister    • Neurological problems Sister    • Scoliosis Sister    • ADD / ADHD Neg Hx    • Anesthesia problems Neg Hx    • Cancer Neg Hx    • Clotting disorder Neg Hx    • Collagen disease Neg Hx    • Diabetes Neg Hx    • Dislocations Neg Hx    • Learning disabilities Neg Hx    • Neurological problems Neg Hx    • Osteoporosis Neg Hx    • Rheumatologic disease Neg Hx    • Scoliosis Neg Hx    • Vascular Disease Neg Hx        Social History     Occupational History   • Not on file   Tobacco Use   • Smoking status: Never   • Smokeless tobacco: Never   Vaping Use   • Vaping Use: Never used   Substance and Sexual Activity   • Alcohol use: Not Currently   • Drug use: No   • Sexual activity: Not Currently     Partners: Male         Current Outpatient Medications:   •  meloxicam (MOBIC) 7 5 mg tablet, Take 1 tablet (7 5 mg total) by mouth daily, Disp: 30 tablet, Rfl: 1  •  acetaminophen (TYLENOL) 650 mg CR tablet, if needed, Disp: , Rfl:   •  albuterol (ProAir HFA) 90 mcg/act inhaler, Inhale 2 puffs every 6 (six) hours as needed for wheezing, Disp: 8 5 g, Rfl: 0  •  albuterol (ProAir HFA) 90 mcg/act inhaler, Inhale 2 puffs every 6 (six) hours as needed for wheezing, Disp: 18 g, Rfl: 2  •  atorvastatin (LIPITOR) 20 mg tablet, TAKE 1 TABLET BY MOUTH EVERY DAY, Disp: 90 tablet, Rfl: 2  •  benzonatate (TESSALON) 200 MG capsule, Take 1 capsule (200 mg total) by mouth 3 (three) times a day as needed for cough, Disp: 30 capsule, Rfl: 0  •  calcium carbonate (OS-WILLY) 1250 (500 Ca) MG tablet, Take 1,250 mg by mouth, Disp: , Rfl:   •  [employer] Aspirin Adult Low Strength 81 MG EC tablet, CHEW AND SWALLOW 1 TABLET BY MOUTH ONCE DAILY, Disp: , Rfl:   •  dicyclomine (BENTYL) 20 mg tablet, Take 1 tablet (20 mg total) by mouth 2 (two) times a day, Disp: 20 tablet, Rfl: 0  •  DULoxetine (CYMBALTA) 60 mg delayed release capsule, Take 1 capsule (60 mg total) by mouth daily, Disp: 90 capsule, Rfl: 1  •  esomeprazole (NexIUM) 40 MG capsule, Take 1 capsule (40 mg total) by mouth daily before breakfast, Disp: 90 capsule, Rfl: 5  •  fluticasone (Flovent Diskus) 100 mcg/actuation diskus inhaler, Inhale 1 puff 2 (two) times a day Rinse mouth after use , Disp: 60 each, Rfl: 5  •  gabapentin (NEURONTIN) 300 mg capsule, Take 1 capsule (300 mg total) by mouth 3 (three) times a day, Disp: 90 capsule, Rfl: 5  •  levothyroxine 50 mcg tablet, TAKE 1 TABLET BY MOUTH EVERY DAY, Disp: 90 tablet, Rfl: 0  •  Magnesium 400 MG TABS, Take by mouth daily, Disp: , Rfl:   •  methylPREDNISolone 4 MG tablet therapy pack, Use as directed on package, Disp: 21 tablet, Rfl: 0  •  midodrine (PROAMATINE) 10 MG tablet, Take 1 tablet (10 mg total) by mouth 3 (three) times a day (Patient taking differently: Take 10 mg by mouth 2 (two) times a day), Disp: 270 tablet, Rfl: 0  •  QUEtiapine (SEROquel) 100 mg tablet, Take 1 tablet (100 mg total) by mouth daily at bedtime, Disp: 90 tablet, Rfl: 1  •  sucralfate (CARAFATE) 1 g tablet, TAKE 1 TABLET (1 G TOTAL) BY MOUTH 4 TIMES A DAY BEFORE MEALS AND AT BEDTIME, Disp: 360 tablet, Rfl: 1    Allergies   Allergen Reactions   • Meperidine Lightheadedness and Other (See Comments)   • Sulfa Antibiotics Hives       Objective:  Vitals:    04/27/23 1556   BP: 140/80   Pulse: 100       Body mass index is 27 29 kg/m²  Right Knee Exam     Tenderness   The patient is experiencing tenderness in the medial joint line  Range of Motion   Extension: 0   Flexion: 120     Tests   Lexa:  Medial - positive Lateral - negative  Varus: negative Valgus: negative  Drawer:  Anterior - negative    Posterior - negative    Other   Erythema: absent  Scars: absent  Sensation: normal  Pulse: present  Swelling: none  Effusion: effusion (scant) present          Observations     Right Knee   Positive for effusion (scant)  Physical Exam  Vitals and nursing note reviewed  Constitutional:       Appearance: Normal appearance  She is well-developed  HENT:      Head: Normocephalic and atraumatic  Right Ear: External ear normal       Left Ear: External ear normal    Eyes:      General: No scleral icterus  Extraocular Movements: Extraocular movements intact  Conjunctiva/sclera: Conjunctivae normal    Cardiovascular:      Rate and Rhythm: Normal rate  Pulmonary:      Effort: Pulmonary effort is normal  No respiratory distress     Musculoskeletal:      Cervical back: Normal range of motion and neck supple  Right knee: Effusion (scant) present  Instability Tests: Medial Lexa test positive  Lateral Lexa test negative  Comments: See Ortho exam   Skin:     General: Skin is warm and dry  Neurological:      Mental Status: She is alert and oriented to person, place, and time  Psychiatric:         Behavior: Behavior normal          This document was created using speech voice recognition software  Grammatical errors, random word insertions, pronoun errors, and incomplete sentences are an occasional consequence of this system due to software limitations, ambient noise, and hardware issues  Any formal questions or concerns about content, text, or information contained within the body of this dictation should be directly addressed to the provider for clarification

## 2023-05-01 ENCOUNTER — APPOINTMENT (EMERGENCY)
Dept: RADIOLOGY | Facility: HOSPITAL | Age: 68
End: 2023-05-01

## 2023-05-01 ENCOUNTER — OFFICE VISIT (OUTPATIENT)
Dept: URGENT CARE | Facility: CLINIC | Age: 68
End: 2023-05-01

## 2023-05-01 ENCOUNTER — HOSPITAL ENCOUNTER (INPATIENT)
Facility: HOSPITAL | Age: 68
LOS: 5 days | Discharge: HOME | End: 2023-05-06
Attending: EMERGENCY MEDICINE | Admitting: INTERNAL MEDICINE

## 2023-05-01 VITALS
WEIGHT: 160 LBS | OXYGEN SATURATION: 100 % | HEIGHT: 65 IN | HEART RATE: 129 BPM | RESPIRATION RATE: 24 BRPM | BODY MASS INDEX: 26.66 KG/M2 | TEMPERATURE: 99 F

## 2023-05-01 DIAGNOSIS — R10.9 ABDOMINAL PAIN: Primary | ICD-10-CM

## 2023-05-01 DIAGNOSIS — I95.1 ORTHOSTATIC HYPOTENSION: ICD-10-CM

## 2023-05-01 DIAGNOSIS — K52.9 INFLAMMATORY BOWEL DISEASE: ICD-10-CM

## 2023-05-01 DIAGNOSIS — K50.90 CROHN'S DISEASE (HCC): ICD-10-CM

## 2023-05-01 DIAGNOSIS — E86.0 DEHYDRATION: ICD-10-CM

## 2023-05-01 DIAGNOSIS — R10.9 ABDOMINAL CRAMPING: ICD-10-CM

## 2023-05-01 DIAGNOSIS — R19.7 DIARRHEA: ICD-10-CM

## 2023-05-01 DIAGNOSIS — R55 SYNCOPE, UNSPECIFIED SYNCOPE TYPE: ICD-10-CM

## 2023-05-01 DIAGNOSIS — R55 SYNCOPE: ICD-10-CM

## 2023-05-01 DIAGNOSIS — R04.2 COUGH WITH HEMOPTYSIS: ICD-10-CM

## 2023-05-01 DIAGNOSIS — R19.7 DIARRHEA, UNSPECIFIED TYPE: Primary | ICD-10-CM

## 2023-05-01 DIAGNOSIS — R10.816 EPIGASTRIC ABDOMINAL TENDERNESS WITHOUT REBOUND TENDERNESS: ICD-10-CM

## 2023-05-01 LAB
2HR DELTA HS TROPONIN: 0 NG/L
ALBUMIN SERPL BCP-MCNC: 3.9 G/DL (ref 3.5–5)
ALP SERPL-CCNC: 74 U/L (ref 34–104)
ALT SERPL W P-5'-P-CCNC: 30 U/L (ref 7–52)
ANION GAP SERPL CALCULATED.3IONS-SCNC: 12 MMOL/L (ref 4–13)
AST SERPL W P-5'-P-CCNC: 37 U/L (ref 13–39)
ATRIAL RATE: 109 BPM
BACTERIA UR QL AUTO: NORMAL /HPF
BASOPHILS # BLD AUTO: 0.02 THOUSANDS/ÂΜL (ref 0–0.1)
BASOPHILS NFR BLD AUTO: 1 % (ref 0–1)
BILIRUB SERPL-MCNC: 0.68 MG/DL (ref 0.2–1)
BILIRUB UR QL STRIP: NEGATIVE
BUN SERPL-MCNC: 15 MG/DL (ref 5–25)
CALCIUM SERPL-MCNC: 9.3 MG/DL (ref 8.4–10.2)
CARDIAC TROPONIN I PNL SERPL HS: 4 NG/L
CARDIAC TROPONIN I PNL SERPL HS: 4 NG/L
CHLORIDE SERPL-SCNC: 103 MMOL/L (ref 96–108)
CLARITY UR: CLEAR
CO2 SERPL-SCNC: 21 MMOL/L (ref 21–32)
COLOR UR: ABNORMAL
CREAT SERPL-MCNC: 0.82 MG/DL (ref 0.6–1.3)
CRP SERPL QL: 97.4 MG/L
EOSINOPHIL # BLD AUTO: 0.06 THOUSAND/ÂΜL (ref 0–0.61)
EOSINOPHIL NFR BLD AUTO: 1 % (ref 0–6)
ERYTHROCYTE [DISTWIDTH] IN BLOOD BY AUTOMATED COUNT: 14.3 % (ref 11.6–15.1)
ERYTHROCYTE [SEDIMENTATION RATE] IN BLOOD: 75 MM/HOUR (ref 0–29)
GFR SERPL CREATININE-BSD FRML MDRD: 74 ML/MIN/1.73SQ M
GLUCOSE SERPL-MCNC: 115 MG/DL (ref 65–140)
GLUCOSE UR STRIP-MCNC: NEGATIVE MG/DL
HCT VFR BLD AUTO: 42.5 % (ref 34.8–46.1)
HGB BLD-MCNC: 14.3 G/DL (ref 11.5–15.4)
HGB UR QL STRIP.AUTO: ABNORMAL
IMM GRANULOCYTES # BLD AUTO: 0 THOUSAND/UL (ref 0–0.2)
IMM GRANULOCYTES NFR BLD AUTO: 0 % (ref 0–2)
KETONES UR STRIP-MCNC: ABNORMAL MG/DL
LACTATE SERPL-SCNC: 1.6 MMOL/L (ref 0.5–2)
LEUKOCYTE ESTERASE UR QL STRIP: NEGATIVE
LIPASE SERPL-CCNC: 12 U/L (ref 11–82)
LYMPHOCYTES # BLD AUTO: 0.96 THOUSANDS/ÂΜL (ref 0.6–4.47)
LYMPHOCYTES NFR BLD AUTO: 23 % (ref 14–44)
MAGNESIUM SERPL-MCNC: 1.8 MG/DL (ref 1.9–2.7)
MCH RBC QN AUTO: 28.5 PG (ref 26.8–34.3)
MCHC RBC AUTO-ENTMCNC: 33.6 G/DL (ref 31.4–37.4)
MCV RBC AUTO: 85 FL (ref 82–98)
MONOCYTES # BLD AUTO: 0.63 THOUSAND/ÂΜL (ref 0.17–1.22)
MONOCYTES NFR BLD AUTO: 15 % (ref 4–12)
NEUTROPHILS # BLD AUTO: 2.49 THOUSANDS/ÂΜL (ref 1.85–7.62)
NEUTS SEG NFR BLD AUTO: 60 % (ref 43–75)
NITRITE UR QL STRIP: NEGATIVE
NON-SQ EPI CELLS URNS QL MICRO: NORMAL /HPF
NRBC BLD AUTO-RTO: 0 /100 WBCS
P AXIS: 60 DEGREES
PH UR STRIP.AUTO: 5.5 [PH]
PLATELET # BLD AUTO: 249 THOUSANDS/UL (ref 149–390)
PMV BLD AUTO: 10.4 FL (ref 8.9–12.7)
POTASSIUM SERPL-SCNC: 4.6 MMOL/L (ref 3.5–5.3)
PR INTERVAL: 152 MS
PROT SERPL-MCNC: 7.3 G/DL (ref 6.4–8.4)
PROT UR STRIP-MCNC: NEGATIVE MG/DL
QRS AXIS: -22 DEGREES
QRSD INTERVAL: 76 MS
QT INTERVAL: 328 MS
QTC INTERVAL: 441 MS
RBC # BLD AUTO: 5.02 MILLION/UL (ref 3.81–5.12)
RBC #/AREA URNS AUTO: NORMAL /HPF
SARS-COV-2 RNA RESP QL NAA+PROBE: NEGATIVE
SODIUM SERPL-SCNC: 136 MMOL/L (ref 135–147)
SP GR UR STRIP.AUTO: 1.01 (ref 1–1.03)
T WAVE AXIS: 37 DEGREES
UROBILINOGEN UR QL STRIP.AUTO: 0.2 E.U./DL
VENTRICULAR RATE: 109 BPM
WBC # BLD AUTO: 4.16 THOUSAND/UL (ref 4.31–10.16)
WBC #/AREA URNS AUTO: NORMAL /HPF

## 2023-05-01 RX ORDER — SUCRALFATE 1 G/1
1 TABLET ORAL
Status: DISCONTINUED | OUTPATIENT
Start: 2023-05-02 | End: 2023-05-06 | Stop reason: HOSPADM

## 2023-05-01 RX ORDER — ALBUTEROL SULFATE 90 UG/1
2 AEROSOL, METERED RESPIRATORY (INHALATION) EVERY 6 HOURS PRN
Status: DISCONTINUED | OUTPATIENT
Start: 2023-05-01 | End: 2023-05-02 | Stop reason: SDUPTHER

## 2023-05-01 RX ORDER — SODIUM CHLORIDE 9 MG/ML
75 INJECTION, SOLUTION INTRAVENOUS CONTINUOUS
Status: DISCONTINUED | OUTPATIENT
Start: 2023-05-02 | End: 2023-05-06 | Stop reason: HOSPADM

## 2023-05-01 RX ORDER — DULOXETIN HYDROCHLORIDE 60 MG/1
60 CAPSULE, DELAYED RELEASE ORAL DAILY
Status: DISCONTINUED | OUTPATIENT
Start: 2023-05-02 | End: 2023-05-06 | Stop reason: HOSPADM

## 2023-05-01 RX ORDER — ACETAMINOPHEN 325 MG/1
650 TABLET ORAL EVERY 6 HOURS PRN
Status: DISCONTINUED | OUTPATIENT
Start: 2023-05-01 | End: 2023-05-04

## 2023-05-01 RX ORDER — MIDODRINE HYDROCHLORIDE 5 MG/1
10 TABLET ORAL
Status: DISCONTINUED | OUTPATIENT
Start: 2023-05-02 | End: 2023-05-06 | Stop reason: HOSPADM

## 2023-05-01 RX ORDER — ASPIRIN 81 MG/1
81 TABLET ORAL DAILY
Status: DISCONTINUED | OUTPATIENT
Start: 2023-05-02 | End: 2023-05-06 | Stop reason: HOSPADM

## 2023-05-01 RX ORDER — MORPHINE SULFATE 4 MG/ML
4 INJECTION, SOLUTION INTRAMUSCULAR; INTRAVENOUS EVERY 6 HOURS PRN
Status: DISCONTINUED | OUTPATIENT
Start: 2023-05-01 | End: 2023-05-06 | Stop reason: HOSPADM

## 2023-05-01 RX ORDER — METHYLPREDNISOLONE SODIUM SUCCINATE 125 MG/2ML
60 INJECTION, POWDER, LYOPHILIZED, FOR SOLUTION INTRAMUSCULAR; INTRAVENOUS ONCE
Status: COMPLETED | OUTPATIENT
Start: 2023-05-01 | End: 2023-05-01

## 2023-05-01 RX ORDER — ONDANSETRON 2 MG/ML
4 INJECTION INTRAMUSCULAR; INTRAVENOUS EVERY 6 HOURS PRN
Status: DISCONTINUED | OUTPATIENT
Start: 2023-05-01 | End: 2023-05-06 | Stop reason: HOSPADM

## 2023-05-01 RX ORDER — LANOLIN ALCOHOL/MO/W.PET/CERES
6 CREAM (GRAM) TOPICAL
Status: DISCONTINUED | OUTPATIENT
Start: 2023-05-01 | End: 2023-05-01

## 2023-05-01 RX ORDER — QUETIAPINE FUMARATE 100 MG/1
100 TABLET, FILM COATED ORAL
Status: DISCONTINUED | OUTPATIENT
Start: 2023-05-02 | End: 2023-05-06 | Stop reason: HOSPADM

## 2023-05-01 RX ORDER — ALBUTEROL SULFATE 90 UG/1
2 AEROSOL, METERED RESPIRATORY (INHALATION) EVERY 6 HOURS PRN
Status: DISCONTINUED | OUTPATIENT
Start: 2023-05-01 | End: 2023-05-06 | Stop reason: HOSPADM

## 2023-05-01 RX ORDER — MORPHINE SULFATE 4 MG/ML
4 INJECTION, SOLUTION INTRAMUSCULAR; INTRAVENOUS ONCE
Status: COMPLETED | OUTPATIENT
Start: 2023-05-01 | End: 2023-05-01

## 2023-05-01 RX ORDER — BUDESONIDE 90 UG/1
1 AEROSOL, POWDER RESPIRATORY (INHALATION) 2 TIMES DAILY
Qty: 1 EACH | Refills: 5 | Status: SHIPPED | OUTPATIENT
Start: 2023-05-01

## 2023-05-01 RX ORDER — HEPARIN SODIUM 5000 [USP'U]/ML
5000 INJECTION, SOLUTION INTRAVENOUS; SUBCUTANEOUS EVERY 8 HOURS
Status: DISCONTINUED | OUTPATIENT
Start: 2023-05-02 | End: 2023-05-06 | Stop reason: HOSPADM

## 2023-05-01 RX ORDER — ATORVASTATIN CALCIUM 20 MG/1
20 TABLET, FILM COATED ORAL DAILY
Status: DISCONTINUED | OUTPATIENT
Start: 2023-05-02 | End: 2023-05-06 | Stop reason: HOSPADM

## 2023-05-01 RX ORDER — MAGNESIUM HYDROXIDE/ALUMINUM HYDROXICE/SIMETHICONE 120; 1200; 1200 MG/30ML; MG/30ML; MG/30ML
30 SUSPENSION ORAL EVERY 6 HOURS PRN
Status: DISCONTINUED | OUTPATIENT
Start: 2023-05-01 | End: 2023-05-06 | Stop reason: HOSPADM

## 2023-05-01 RX ORDER — PANTOPRAZOLE SODIUM 40 MG/1
40 TABLET, DELAYED RELEASE ORAL
Status: DISCONTINUED | OUTPATIENT
Start: 2023-05-02 | End: 2023-05-06 | Stop reason: HOSPADM

## 2023-05-01 RX ORDER — MAGNESIUM SULFATE HEPTAHYDRATE 40 MG/ML
2 INJECTION, SOLUTION INTRAVENOUS ONCE
Status: COMPLETED | OUTPATIENT
Start: 2023-05-01 | End: 2023-05-01

## 2023-05-01 RX ORDER — GABAPENTIN 300 MG/1
300 CAPSULE ORAL 3 TIMES DAILY
Status: DISCONTINUED | OUTPATIENT
Start: 2023-05-02 | End: 2023-05-06 | Stop reason: HOSPADM

## 2023-05-01 RX ORDER — ONDANSETRON 2 MG/ML
4 INJECTION INTRAMUSCULAR; INTRAVENOUS ONCE
Status: COMPLETED | OUTPATIENT
Start: 2023-05-01 | End: 2023-05-01

## 2023-05-01 RX ORDER — FLUTICASONE PROPIONATE 110 UG/1
1 AEROSOL, METERED RESPIRATORY (INHALATION) EVERY 12 HOURS SCHEDULED
Status: DISCONTINUED | OUTPATIENT
Start: 2023-05-02 | End: 2023-05-06 | Stop reason: HOSPADM

## 2023-05-01 RX ORDER — LEVOTHYROXINE SODIUM 0.05 MG/1
50 TABLET ORAL
Status: DISCONTINUED | OUTPATIENT
Start: 2023-05-02 | End: 2023-05-06 | Stop reason: HOSPADM

## 2023-05-01 RX ADMIN — SODIUM CHLORIDE 1000 ML: 0.9 INJECTION, SOLUTION INTRAVENOUS at 14:07

## 2023-05-01 RX ADMIN — METHYLPREDNISOLONE SODIUM SUCCINATE 60 MG: 125 INJECTION, POWDER, FOR SOLUTION INTRAMUSCULAR; INTRAVENOUS at 17:40

## 2023-05-01 RX ADMIN — IOHEXOL 100 ML: 350 INJECTION, SOLUTION INTRAVENOUS at 16:08

## 2023-05-01 RX ADMIN — ONDANSETRON 4 MG: 2 INJECTION INTRAMUSCULAR; INTRAVENOUS at 14:19

## 2023-05-01 RX ADMIN — MAGNESIUM SULFATE HEPTAHYDRATE 2 G: 40 INJECTION, SOLUTION INTRAVENOUS at 15:23

## 2023-05-01 RX ADMIN — MORPHINE SULFATE 4 MG: 4 INJECTION INTRAVENOUS at 14:25

## 2023-05-01 NOTE — ED PROVIDER NOTES
History  Chief Complaint   Patient presents with   • Syncope     States sent from [facility]  Started on 4/28 with abdominal cramping and diarrhea  Nausea , no vomiting  States diarrhea every hour  States became dizzy on 4/29 and passed out striking head on kitchen floor  Pain forehead, no neck pain  , pale   • Diarrhea     71-year-old female with past history of GERD, hypothyroidism, depression, anxiety, melanoma, upper lipidemia, presents to the ED for evaluation of generalized weakness and diarrhea over the past 3 days  Patient states that she has had nonstop watery diarrhea throughout the day over the past 3 to 4 days  Subsequently patient feels very weak  Patient states that she became very dizzy and had a syncopal episode at home 2 days ago  Patient fell forward and hit her right forehead  Patient does complain of a mild headache to the right forehead region  Patient states that every time she tries to eat something it passes right through her system  She denies any nausea or vomiting  Patient denies any fevers or chills  Patient complains of pain to the right periumbilical region  Patient came to the ED for further evaluation as she feels very weak and lightheaded and she thinks she may be dehydrated  History provided by:  Patient      Prior to Admission Medications   Prescriptions Last Dose Informant Patient Reported? Taking?    [employer] Aspirin Adult Low Strength 81 MG EC tablet   Yes No   Sig: CHEW AND SWALLOW 1 TABLET BY MOUTH ONCE DAILY   DULoxetine (CYMBALTA) 60 mg delayed release capsule   No No   Sig: Take 1 capsule (60 mg total) by mouth daily   Magnesium 400 MG TABS   Yes No   Sig: Take by mouth daily   QUEtiapine (SEROquel) 100 mg tablet   No No   Sig: Take 1 tablet (100 mg total) by mouth daily at bedtime   acetaminophen (TYLENOL) 650 mg CR tablet   Yes No   Sig: if needed   albuterol (ProAir HFA) 90 mcg/act inhaler   No No   Sig: Inhale 2 puffs every 6 (six) hours as needed for wheezing   albuterol (ProAir HFA) 90 mcg/act inhaler   No No   Sig: Inhale 2 puffs every 6 (six) hours as needed for wheezing   atorvastatin (LIPITOR) 20 mg tablet   No No   Sig: TAKE 1 TABLET BY MOUTH EVERY DAY   benzonatate (TESSALON) 200 MG capsule   No No   Sig: Take 1 capsule (200 mg total) by mouth 3 (three) times a day as needed for cough   Patient not taking: Reported on 5/1/2023   budesonide (Pulmicort Flexhaler) 90 MCG/ACT inhaler   No No   Sig: Inhale 1 puff 2 (two) times a day   calcium carbonate (OS-WILLY) 1250 (500 Ca) MG tablet   Yes No   Sig: Take 1,250 mg by mouth   dicyclomine (BENTYL) 20 mg tablet   No No   Sig: Take 1 tablet (20 mg total) by mouth 2 (two) times a day   esomeprazole (NexIUM) 40 MG capsule   No No   Sig: Take 1 capsule (40 mg total) by mouth daily before breakfast   gabapentin (NEURONTIN) 300 mg capsule   No No   Sig: Take 1 capsule (300 mg total) by mouth 3 (three) times a day   levothyroxine 50 mcg tablet   No No   Sig: TAKE 1 TABLET BY MOUTH EVERY DAY   meloxicam (MOBIC) 7 5 mg tablet   No No   Sig: Take 1 tablet (7 5 mg total) by mouth daily   methylPREDNISolone 4 MG tablet therapy pack   No No   Sig: Use as directed on package   midodrine (PROAMATINE) 10 MG tablet   No No   Sig: Take 1 tablet (10 mg total) by mouth 3 (three) times a day   Patient taking differently: Take 10 mg by mouth 2 (two) times a day   sucralfate (CARAFATE) 1 g tablet   No No   Sig: TAKE 1 TABLET (1 G TOTAL) BY MOUTH 4 TIMES A DAY BEFORE MEALS AND AT BEDTIME      Facility-Administered Medications: None       Past Medical History:   Diagnosis Date   • Abdominal adhesions    • Anesthesia complication     difficult to wake up   • Anxiety    • Arthritis    • Cancer (HCC)     melanoma   • Colon polyp    • Depression    • Depression    • Disease of thyroid gland    • Fibromyalgia    • Fibromyalgia, primary    • Fractures 2006   • GERD (gastroesophageal reflux disease)    • History of cholecystectomy 09/07/2019 • Hyperlipidemia    • Irregular heart beat     can be tachy; also has orthoststic hypotension   • Lazy eye     resolved: 3/27/17   • Medical clearance for psychiatric admission 07/13/2021   • Melanoma ([address] ) 2010   • Osteoarthritis 07/2018   • Osteopenia     with joint pain-elevated DEV   • Psychiatric disorder    • Shortness of breath     assoc with tachycardia   • Stomach disorder 1995   • Tinnitus    • Wears glasses     for reading       Past Surgical History:   Procedure Laterality Date   • ABDOMINAL SURGERY      lysis of adhesions x 2   • APPENDECTOMY     • CERVICAL FUSION      May 5,2021 and Jan 01,2020   • CHOLECYSTECTOMY      open   • COLONOSCOPY     • DILATION AND CURETTAGE OF UTERUS     • FOOT SURGERY  05/2017   • NECK SURGERY  01/2019 5/2021   • NEUROMA EXCISION Right 05/26/2017    Procedure: EXCISION MASS / FIBROMA FOOT;  Surgeon: Whit Aranda DPM;  Location: [address] [place] [address];  Service:    • PARS PLANA VITRECTOMY W/ REPAIR OF MACULAR HOLE  12/23/2020   • IN XCAPSL CTRC RMVL INSJ IO LENS PROSTH W/O ECP Left 12/06/2021    Procedure: EXTRACTION EXTRACAPSULAR CATARACT PHACO INTRAOCULAR LENS (IOL);   Surgeon: Jasper Bower MD;  Location: [facility] MAIN OR;  Service: Ophthalmology   • RIGHT OOPHORECTOMY     • WISDOM TOOTH EXTRACTION      x4       Family History   Problem Relation Age of Onset   • Heart disease Mother    • Stroke Mother 58   • COPD Mother    • Arthritis Mother    • Hypertension Father    • Kidney disease Brother         kidney transplant   • Multiple sclerosis Sister    • No Known Problems Maternal Aunt    • No Known Problems Maternal Uncle    • No Known Problems Paternal Aunt    • No Known Problems Paternal Uncle    • No Known Problems Maternal Grandmother    • No Known Problems Maternal Grandfather    • No Known Problems Paternal Grandmother    • No Known Problems Paternal Grandfather    • Dislocations Sister    • Neurological problems Sister    • Scoliosis Sister    • ADD / ADHD Neg Hx    • Anesthesia problems Neg Hx    • Cancer Neg Hx    • Clotting disorder Neg Hx    • Collagen disease Neg Hx    • Diabetes Neg Hx    • Dislocations Neg Hx    • Learning disabilities Neg Hx    • Neurological problems Neg Hx    • Osteoporosis Neg Hx    • Rheumatologic disease Neg Hx    • Scoliosis Neg Hx    • Vascular Disease Neg Hx      I have reviewed and agree with the history as documented  E-Cigarette/Vaping   • E-Cigarette Use Never User      E-Cigarette/Vaping Substances   • Nicotine No    • THC No    • CBD No    • Flavoring No    • Other No    • Unknown No      Social History     Tobacco Use   • Smoking status: Never   • Smokeless tobacco: Never   Vaping Use   • Vaping Use: Never used   Substance Use Topics   • Alcohol use: Not Currently   • Drug use: No       Review of Systems   Constitutional: Negative for chills and fever  HENT: Negative for ear pain and sore throat  Eyes: Negative for pain and visual disturbance  Respiratory: Negative for cough and shortness of breath  Cardiovascular: Negative for chest pain and palpitations  Gastrointestinal: Positive for abdominal pain and diarrhea  Negative for vomiting  Genitourinary: Negative for dysuria and hematuria  Musculoskeletal: Negative for arthralgias and back pain  Skin: Negative for color change and rash  Neurological: Positive for weakness  Negative for seizures and syncope  All other systems reviewed and are negative  Physical Exam  Physical Exam  Vitals and nursing note reviewed  Constitutional:       General: She is not in acute distress  Appearance: She is well-developed  HENT:      Head: Normocephalic and atraumatic  Comments: Tenderness to palpation noted to the right forehead region  Mouth/Throat:      Mouth: Mucous membranes are dry  Comments: Mucous membranes are dry  Eyes:      Conjunctiva/sclera: Conjunctivae normal    Cardiovascular:      Rate and Rhythm: Regular rhythm  Tachycardia present  Heart sounds: No murmur heard  Comments: Sinus tachycardia noted on the monitor  Pulmonary:      Effort: Pulmonary effort is normal  No respiratory distress  Breath sounds: Normal breath sounds  Comments: Lungs are clear to auscultation bilateral   Abdominal:      Palpations: Abdomen is soft  Tenderness: There is no abdominal tenderness  Comments: Abdomen is soft, nondistended, with bowel sound present to all 4 quadrants  Moderate tenderness to palpation noted to the right periumbilical and right side of abdomen  Musculoskeletal:         General: No swelling  Cervical back: Neck supple  Skin:     General: Skin is warm and dry  Capillary Refill: Capillary refill takes less than 2 seconds  Neurological:      Mental Status: She is alert     Psychiatric:         Mood and Affect: Mood normal          Vital Signs  ED Triage Vitals [05/01/23 1315]   Temperature Pulse Respirations Blood Pressure SpO2   (!) 97 4 °F (36 3 °C) (!) 128 (!) 24 138/80 100 %      Temp Source Heart Rate Source Patient Position - Orthostatic VS BP Location FiO2 (%)   Tympanic Monitor Sitting Left arm --      Pain Score       7           Vitals:    05/01/23 1522 05/01/23 1525 05/01/23 1528 05/01/23 1623   BP: 137/74 120/56 113/57 141/98   Pulse: 90 98 (!) 111 94   Patient Position - Orthostatic VS: Lying Sitting - Orthostatic VS Standing - Orthostatic VS Lying         Visual Acuity      ED Medications  Medications   methylPREDNISolone sodium succinate (Solu-MEDROL) injection 60 mg (has no administration in time range)   morphine injection 4 mg (4 mg Intravenous Given 5/1/23 1425)   ondansetron (ZOFRAN) injection 4 mg (4 mg Intravenous Given 5/1/23 1419)   sodium chloride 0 9 % bolus 1,000 mL (0 mL Intravenous Stopped 5/1/23 1516)   magnesium sulfate 2 g/50 mL IVPB (premix) 2 g (2 g Intravenous New Bag 5/1/23 1523)   iohexol (OMNIPAQUE) 350 MG/ML injection (SINGLE-DOSE) 100 mL (100 mL Intravenous Given 5/1/23 1608)       Diagnostic Studies  Results Reviewed     Procedure Component Value Units Date/Time    Sedimentation rate, automated [[id number]]     Lab Status: No result Specimen: Blood     C-reactive protein [[id number]]     Lab Status: No result Specimen: Blood     HS Troponin I 2hr [[id number]]  (Normal) Collected: 05/01/23 1630    Lab Status: Final result Specimen: Blood Updated: 05/01/23 1702     hs TnI 2hr 4 ng/L      Delta 2hr hsTnI 0 ng/L     Blood culture #1 [[id number]] Collected: 05/01/23 1344    Lab Status: Preliminary result Specimen: Blood from Arm, Left Updated: 05/01/23 1701     Blood Culture Received in Microbiology Lab  Culture in Progress  Blood culture #2 [[id number]] Collected: 05/01/23 1407    Lab Status: Preliminary result Specimen: Blood from Arm, Right Updated: 05/01/23 1701     Blood Culture Received in Microbiology Lab  Culture in Progress  HS Troponin I 4hr [[id number]]     Lab Status: No result Specimen: Blood     COVID only [[id number]]  (Normal) Collected: 05/01/23 1418    Lab Status: Final result Specimen: Nares from Nose Updated: 05/01/23 1515     SARS-CoV-2 Negative    Narrative:      FOR PEDIATRIC PATIENTS - copy/paste COVID Guidelines URL to browser: [email or web address]  [employer]    SARS-CoV-2 assay is a Nucleic Acid Amplification assay intended for the  qualitative detection of nucleic acid from SARS-CoV-2 in nasopharyngeal  swabs  Results are for the presumptive identification of SARS-CoV-2 RNA  Positive results are indicative of infection with SARS-CoV-2, the virus  causing COVID-19, but do not rule out bacterial infection or co-infection  with other viruses  Laboratories within the [place] and its  territories are required to report all positive results to the appropriate  public health authorities   Negative results do not preclude SARS-CoV-2  infection and should not be used as the sole basis for treatment or other  patient management decisions  Negative results must be combined with  clinical observations, patient history, and epidemiological information  This test has not been FDA cleared or approved  This test has been authorized by FDA under an Emergency Use Authorization  (EUA)  This test is only authorized for the duration of time the  declaration that circumstances exist justifying the authorization of the  emergency use of an in vitro diagnostic tests for detection of SARS-CoV-2  virus and/or diagnosis of COVID-19 infection under section 564(b)(1) of  the Act, 21 U  S C  427RED-7(K)(5), unless the authorization is terminated  or revoked sooner  The test has been validated but independent review by FDA  and CLIA is pending  Test performed using [employer]: This RT-PCR assay targets N2,  a region unique to SARS-CoV-2  A conserved region in the E-gene was chosen  for pan-Sarbecovirus detection which includes SARS-CoV-2  According to CMS-2020-01-R, this platform meets the definition of high-throughput technology  HS Troponin 0hr (reflex protocol) [[id number]]  (Normal) Collected: 05/01/23 1344    Lab Status: Final result Specimen: Blood from Arm, Left Updated: 05/01/23 1428     hs TnI 0hr 4 ng/L     Lactic acid, plasma (w/reflex if result > 2 0) [[id number]]  (Normal) Collected: 05/01/23 1344    Lab Status: Final result Specimen: Blood from Arm, Left Updated: 05/01/23 1425     LACTIC ACID 1 6 mmol/L     Narrative:      Result may be elevated if tourniquet was used during collection      Comprehensive metabolic panel [[id number]] Collected: 05/01/23 1344    Lab Status: Final result Specimen: Blood from Arm, Left Updated: 05/01/23 1425     Sodium 136 mmol/L      Potassium 4 6 mmol/L      Chloride 103 mmol/L      CO2 21 mmol/L      ANION GAP 12 mmol/L      BUN 15 mg/dL      Creatinine 0 82 mg/dL      Glucose 115 mg/dL      Calcium 9 3 mg/dL      AST 37 U/L      ALT 30 U/L      Alkaline Phosphatase 74 U/L      Total Protein 7 3 g/dL      Albumin 3 9 g/dL      Total Bilirubin 0 68 mg/dL      eGFR 74 ml/min/1 73sq m     Narrative:      National Kidney Disease Foundation guidelines for Chronic Kidney Disease (CKD):   •  Stage 1 with normal or high GFR (GFR > 90 mL/min/1 73 square meters)  •  Stage 2 Mild CKD (GFR = 60-89 mL/min/1 73 square meters)  •  Stage 3A Moderate CKD (GFR = 45-59 mL/min/1 73 square meters)  •  Stage 3B Moderate CKD (GFR = 30-44 mL/min/1 73 square meters)  •  Stage 4 Severe CKD (GFR = 15-29 mL/min/1 73 square meters)  •  Stage 5 End Stage CKD (GFR <15 mL/min/1 73 square meters)  Note: GFR calculation is accurate only with a steady state creatinine    Magnesium [[id number]]  (Abnormal) Collected: 05/01/23 1344    Lab Status: Final result Specimen: Blood from Arm, Left Updated: 05/01/23 1425     Magnesium 1 8 mg/dL     Lipase [[id number]]  (Normal) Collected: 05/01/23 1344    Lab Status: Final result Specimen: Blood from Arm, Left Updated: 05/01/23 1425     Lipase 12 u/L     CBC and differential [[id number]]  (Abnormal) Collected: 05/01/23 1344    Lab Status: Final result Specimen: Blood from Arm, Left Updated: 05/01/23 1403     WBC 4 16 Thousand/uL      RBC 5 02 Million/uL      Hemoglobin 14 3 g/dL      Hematocrit 42 5 %      MCV 85 fL      MCH 28 5 pg      MCHC 33 6 g/dL      RDW 14 3 %      MPV 10 4 fL      Platelets 839 Thousands/uL      nRBC 0 /100 WBCs      Neutrophils Relative 60 %      Immat GRANS % 0 %      Lymphocytes Relative 23 %      Monocytes Relative 15 %      Eosinophils Relative 1 %      Basophils Relative 1 %      Neutrophils Absolute 2 49 Thousands/µL      Immature Grans Absolute 0 00 Thousand/uL      Lymphocytes Absolute 0 96 Thousands/µL      Monocytes Absolute 0 63 Thousand/µL      Eosinophils Absolute 0 06 Thousand/µL      Basophils Absolute 0 02 Thousands/µL     Stool Enteric Bacterial Panel by PCR [[id number]]     Lab Status: No result Specimen: Stool     Clostridium difficile toxin by PCR "with EIA [[id number]]     Lab Status: No result Specimen: Stool     Ova and parasite examination [[id number]]     Lab Status: No result Specimen: Stool from Rectum     UA w Reflex to Microscopic w Reflex to Culture [[id number]]     Lab Status: No result Specimen: Urine                  CT head without contrast   Final Result by Shannen Hanley MD (05/01 1639)      No acute intracranial abnormality  Workstation performed: [id number]         CT abdomen pelvis with contrast   Final Result by Shannen Hanley MD (05/01 1708)      Mild mucosal enhancement and submucosal wall thickening most notably involving the terminal ileum  Diagnostic considerations include infectious enteritis and mild changes of active inflammatory bowel disease  Small and large bowel diverticulosis with no evidence for acute diverticulitis  No abdominopelvic abscess  This examination was marked \"immediate notification\" in Epic in order to begin the standard process by which the radiology reading room liaison alerts the referring practitioner  Workstation performed: [id number]                    Procedures  ECG 12 Lead Documentation Only    Date/Time: 5/1/2023 1:26 PM  Performed by: Lamar Amaya DO  Authorized by: Lamar Amaya DO     Indications / Diagnosis:  Weakness  ECG reviewed by me, the ED Provider: yes    Patient location:  ED  Previous ECG:     Previous ECG:  Compared to current    Similarity:  No change    Comparison to cardiac monitor: Yes    Interpretation:     Interpretation: abnormal    Comments:      Sinus rhythm, rate 109, normal axis, normal intervals, no acute ST/T wave abdomens noted, wide P waves noted suggesting possible left atrial enlargement, minimal voltage criteria noted for LVH, unchanged from previous study  ED Course  ED Course as of 05/01/23 1726   Mon May 01, 2023   1542 Orthostatic vital signs are positive                                 SBIRT 22yo+  " Flowsheet Row Most Recent Value   Initial Alcohol Screen: US AUDIT-C     1  How often do you have a drink containing alcohol? 0 Filed at: 05/01/2023 1429   2  How many drinks containing alcohol do you have on a typical day you are drinking? 0 Filed at: 05/01/2023 1429   3a  Male UNDER 65: How often do you have five or more drinks on one occasion? 0 Filed at: 05/01/2023 1429   3b  FEMALE Any Age, or MALE 65+: How often do you have 4 or more drinks on one occassion? 0 Filed at: 05/01/2023 1429   Audit-C Score 0 Filed at: 05/01/2023 1429   SHARON: How many times in the past year have you    Used an illegal drug or used a prescription medication for non-medical reasons? Never Filed at: 05/01/2023 1429                    Medical Decision Making  Obtain blood work, orthostatic vital signs, UA, CT head, CT abdomen/pelvis  Give IV fluids, pain medication and continue to monitor patient for any worsening symptoms    His orthostatic vital signs were positive  Patient felt better with IV fluids as well as IV narcotics for abdominal pain  CT scan showed concern for inflammatory bowel disease  Patient states that there was one point where her doctors discussed with her possibility of ulcerative colitis versus Crohn's disease  Patient given IV steroids and at this time patient will be admitted for further evaluation and management  Patient agrees with admission plans  Amount and/or Complexity of Data Reviewed  External Data Reviewed: ECG  Labs: ordered  Decision-making details documented in ED Course  Radiology: ordered  Decision-making details documented in ED Course  ECG/medicine tests: ordered and independent interpretation performed  Decision-making details documented in ED Course  Risk  Prescription drug management  Decision regarding hospitalization            Disposition  Final diagnoses:   Abdominal pain   Dehydration   Diarrhea   Inflammatory bowel disease   Orthostatic hypotension   Syncope     Time reflects when diagnosis was documented in both MDM as applicable and the Disposition within this note     Time User Action Codes Description Comment    5/1/2023  5:21 PM Raf Bonnet Add [R10 9] Abdominal pain     5/1/2023  5:21 PM Raf Bonnet Add [E86 0] Dehydration     5/1/2023  5:21 PM Raf Bonnet Add [R19 7] Diarrhea     5/1/2023  5:21 PM Summers Bonnet Add [K52 9] Inflammatory bowel disease     5/1/2023  5:24 PM Ferdous, Denita Bran Add [I95 1] Orthostatic hypotension     5/1/2023  5:24 PM Raf Bonnet Add [R55] Syncope       ED Disposition     ED Disposition   Admit    Condition   Stable    Date/Time   Mon May 1, 2023  5:24 PM    Comment   Case was discussed with Dr Petra Santos and the patient's admission status was agreed to be Admission Status: inpatient status to the service of Dr Petra Santos  Follow-up Information    None         Patient's Medications   Discharge Prescriptions    No medications on file       No discharge procedures on file      PDMP Review       Value Time User    PDMP Reviewed  Yes 11/24/2022 10:33 AM [facility]          ED Provider  Electronically Signed by           Tammie Reddy DO  05/01/23 4799

## 2023-05-01 NOTE — PROGRESS NOTES
[facility]        NAME: Leatha Mathis is a 78 y o  female  : 1955    MRN: [id number]  DATE: May 1, 2023  TIME: 12:50 PM    Assessment and Plan   Diarrhea, unspecified type [R19 7]  1  Diarrhea, unspecified type        2  Abdominal cramping  Transfer to other facility      3  Syncope, unspecified syncope type  Transfer to other facility            Patient Instructions   Patient Instructions   I recommend you go to the emergency room        Follow up with PCP in 3-5 days  Proceed to  ER if symptoms worsen  Chief Complaint     Chief Complaint   Patient presents with    Diarrhea     Diarrhea since Friday using Brat diet still having cramping and diarrhea fell hit head on kitchen floor did not know if she passed out  History of Present Illness       The patient is a 41-year-old female with a hx of Chrones presenting today for 4 days and diarrhea, fever and cramping  She had a syncopal episode and hit her head on the kitchen floor early yesterday morning  She reports feeling dizzy and then waking up after her head hit the floor  She also reports slight fevers at home  Review of Systems   Review of Systems   Constitutional: Positive for fever  Negative for activity change, appetite change, chills and fatigue  HENT: Negative for congestion, ear pain, rhinorrhea, sinus pressure, sinus pain and sore throat  Eyes: Negative for pain and visual disturbance  Respiratory: Negative for cough, chest tightness and shortness of breath  Cardiovascular: Negative for chest pain and palpitations  Gastrointestinal: Positive for abdominal pain, diarrhea and nausea  Negative for vomiting  Genitourinary: Negative for dysuria and hematuria  Musculoskeletal: Negative for arthralgias, back pain and myalgias  Skin: Negative for color change, pallor and rash  Neurological: Negative for seizures, syncope and headaches  All other systems reviewed and are negative          Current Medications       Current Outpatient Medications:     acetaminophen (TYLENOL) 650 mg CR tablet, if needed, Disp: , Rfl:     albuterol (ProAir HFA) 90 mcg/act inhaler, Inhale 2 puffs every 6 (six) hours as needed for wheezing, Disp: 8 5 g, Rfl: 0    albuterol (ProAir HFA) 90 mcg/act inhaler, Inhale 2 puffs every 6 (six) hours as needed for wheezing, Disp: 18 g, Rfl: 2    atorvastatin (LIPITOR) 20 mg tablet, TAKE 1 TABLET BY MOUTH EVERY DAY, Disp: 90 tablet, Rfl: 2    calcium carbonate (OS-WILLY) 1250 (500 Ca) MG tablet, Take 1,250 mg by mouth, Disp: , Rfl:     [employer] Aspirin Adult Low Strength 81 MG EC tablet, CHEW AND SWALLOW 1 TABLET BY MOUTH ONCE DAILY, Disp: , Rfl:     dicyclomine (BENTYL) 20 mg tablet, Take 1 tablet (20 mg total) by mouth 2 (two) times a day, Disp: 20 tablet, Rfl: 0    DULoxetine (CYMBALTA) 60 mg delayed release capsule, Take 1 capsule (60 mg total) by mouth daily, Disp: 90 capsule, Rfl: 1    esomeprazole (NexIUM) 40 MG capsule, Take 1 capsule (40 mg total) by mouth daily before breakfast, Disp: 90 capsule, Rfl: 5    fluticasone (Flovent Diskus) 100 mcg/actuation diskus inhaler, Inhale 1 puff 2 (two) times a day Rinse mouth after use , Disp: 60 each, Rfl: 5    gabapentin (NEURONTIN) 300 mg capsule, Take 1 capsule (300 mg total) by mouth 3 (three) times a day, Disp: 90 capsule, Rfl: 5    levothyroxine 50 mcg tablet, TAKE 1 TABLET BY MOUTH EVERY DAY, Disp: 90 tablet, Rfl: 0    Magnesium 400 MG TABS, Take by mouth daily, Disp: , Rfl:     meloxicam (MOBIC) 7 5 mg tablet, Take 1 tablet (7 5 mg total) by mouth daily, Disp: 30 tablet, Rfl: 1    midodrine (PROAMATINE) 10 MG tablet, Take 1 tablet (10 mg total) by mouth 3 (three) times a day (Patient taking differently: Take 10 mg by mouth 2 (two) times a day), Disp: 270 tablet, Rfl: 0    QUEtiapine (SEROquel) 100 mg tablet, Take 1 tablet (100 mg total) by mouth daily at bedtime, Disp: 90 tablet, Rfl: 1    benzonatate (TESSALON) 200 MG capsule, Take 1 capsule (200 mg total) by mouth 3 (three) times a day as needed for cough (Patient not taking: Reported on 5/1/2023), Disp: 30 capsule, Rfl: 0    methylPREDNISolone 4 MG tablet therapy pack, Use as directed on package, Disp: 21 tablet, Rfl: 0    sucralfate (CARAFATE) 1 g tablet, TAKE 1 TABLET (1 G TOTAL) BY MOUTH 4 TIMES A DAY BEFORE MEALS AND AT BEDTIME, Disp: 360 tablet, Rfl: 1    Current Allergies     Allergies as of 05/01/2023 - Reviewed 05/01/2023   Allergen Reaction Noted    Meperidine Lightheadedness and Other (See Comments) 01/11/2006    Sulfa antibiotics Hives 01/11/2006            The following portions of the patient's history were reviewed and updated as appropriate: allergies, current medications, past family history, past medical history, past social history, past surgical history and problem list      Past Medical History:   Diagnosis Date    Abdominal adhesions     Anesthesia complication     difficult to wake up    Anxiety     Arthritis     Cancer ([facility] [address] )     melanoma    Colon polyp     Depression     Depression     Disease of thyroid gland     Fibromyalgia     Fibromyalgia, primary     Fractures 2006    GERD (gastroesophageal reflux disease)     History of cholecystectomy 09/07/2019    Hyperlipidemia     Irregular heart beat     can be tachy; also has orthoststic hypotension    Lazy eye     resolved: 3/27/17    Medical clearance for psychiatric admission 07/13/2021    Melanoma ([facility] [address] ) 2010    Osteoarthritis 07/2018    Osteopenia     with joint pain-elevated DEV    Psychiatric disorder     Shortness of breath     assoc with tachycardia    Stomach disorder 1995    Tinnitus     Wears glasses     for reading       Past Surgical History:   Procedure Laterality Date    ABDOMINAL SURGERY      lysis of adhesions x 2    APPENDECTOMY      CERVICAL FUSION      May 5,2021 and Jan 01,2020    CHOLECYSTECTOMY      open    COLONOSCOPY      DILATION AND CURETTAGE OF "UTERUS      FOOT SURGERY  05/2017    NECK SURGERY  01/2019 5/2021    NEUROMA EXCISION Right 05/26/2017    Procedure: EXCISION MASS / FIBROMA FOOT;  Surgeon: Darshana Kerr DPM;  Location: [address] [place] [address];  Service:     PARS PLANA VITRECTOMY W/ REPAIR OF MACULAR HOLE  12/23/2020    KS XCAPSL CTRC RMVL INSJ IO LENS PROSTH W/O ECP Left 12/06/2021    Procedure: EXTRACTION EXTRACAPSULAR CATARACT PHACO INTRAOCULAR LENS (IOL); Surgeon: Lopez Wright MD;  Location: [facility] MAIN OR;  Service: Ophthalmology    RIGHT OOPHORECTOMY      WISDOM TOOTH EXTRACTION      x4       Family History   Problem Relation Age of Onset    Heart disease Mother     Stroke Mother 58    COPD Mother     Arthritis Mother     Hypertension Father     Kidney disease Brother         kidney transplant    Multiple sclerosis Sister     No Known Problems Maternal Aunt     No Known Problems Maternal Uncle     No Known Problems Paternal Aunt     No Known Problems Paternal Uncle     No Known Problems Maternal Grandmother     No Known Problems Maternal Grandfather     No Known Problems Paternal Grandmother     No Known Problems Paternal Grandfather     Dislocations Sister     Neurological problems Sister     Scoliosis Sister     ADD / ADHD Neg Hx     Anesthesia problems Neg Hx     Cancer Neg Hx     Clotting disorder Neg Hx     Collagen disease Neg Hx     Diabetes Neg Hx     Dislocations Neg Hx     Learning disabilities Neg Hx     Neurological problems Neg Hx     Osteoporosis Neg Hx     Rheumatologic disease Neg Hx     Scoliosis Neg Hx     Vascular Disease Neg Hx          Medications have been verified  Objective   Pulse (!) 129   Temp 99 °F (37 2 °C)   Resp (!) 24   Ht 5' 5\" (1 651 m)   Wt 72 6 kg (160 lb)   LMP  (LMP Unknown)   SpO2 100%   BMI 26 63 kg/m²        Physical Exam     Physical Exam  Constitutional:       General: She is not in acute distress  Appearance: Normal appearance  She is normal weight   " She is ill-appearing  She is not toxic-appearing or diaphoretic  Comments: Patient laying in the waiting  Appears to be in pain while walking in and out   HENT:      Head: Normocephalic and atraumatic  Mouth/Throat:      Mouth: Mucous membranes are moist       Pharynx: Oropharynx is clear  No oropharyngeal exudate or posterior oropharyngeal erythema  Eyes:      Extraocular Movements: Extraocular movements intact  Conjunctiva/sclera: Conjunctivae normal       Pupils: Pupils are equal, round, and reactive to light  Cardiovascular:      Rate and Rhythm: Regular rhythm  Tachycardia present  Heart sounds: Normal heart sounds  No murmur heard  No friction rub  No gallop  Pulmonary:      Effort: No respiratory distress  Breath sounds: Normal breath sounds  No stridor  No wheezing, rhonchi or rales  Comments: tachypnea   Chest:      Chest wall: No tenderness  Abdominal:      General: Abdomen is flat  Bowel sounds are normal  There is no distension  Palpations: Abdomen is soft  There is no mass  Tenderness: There is abdominal tenderness  There is no right CVA tenderness, left CVA tenderness, guarding or rebound  Hernia: No hernia is present  Skin:     General: Skin is warm and dry  Capillary Refill: Capillary refill takes less than 2 seconds

## 2023-05-02 PROBLEM — E86.0 DEHYDRATION: Status: ACTIVE | Noted: 2023-05-02

## 2023-05-02 PROBLEM — R19.7 DIARRHEA: Status: ACTIVE | Noted: 2023-05-02

## 2023-05-02 LAB
ALBUMIN SERPL BCP-MCNC: 3.3 G/DL (ref 3.5–5)
ALP SERPL-CCNC: 67 U/L (ref 34–104)
ALT SERPL W P-5'-P-CCNC: 22 U/L (ref 7–52)
AMPHETAMINES SERPL QL SCN: NEGATIVE
ANION GAP SERPL CALCULATED.3IONS-SCNC: 9 MMOL/L (ref 4–13)
APTT PPP: 28 SECONDS (ref 23–37)
AST SERPL W P-5'-P-CCNC: 17 U/L (ref 13–39)
BARBITURATES UR QL: NEGATIVE
BASOPHILS # BLD AUTO: 0.01 THOUSANDS/ÂΜL (ref 0–0.1)
BASOPHILS NFR BLD AUTO: 0 % (ref 0–1)
BENZODIAZ UR QL: NEGATIVE
BILIRUB SERPL-MCNC: 0.35 MG/DL (ref 0.2–1)
BUN SERPL-MCNC: 15 MG/DL (ref 5–25)
CALCIUM ALBUM COR SERPL-MCNC: 8.7 MG/DL (ref 8.3–10.1)
CALCIUM SERPL-MCNC: 8.1 MG/DL (ref 8.4–10.2)
CHLORIDE SERPL-SCNC: 107 MMOL/L (ref 96–108)
CO2 SERPL-SCNC: 22 MMOL/L (ref 21–32)
COCAINE UR QL: NEGATIVE
CREAT SERPL-MCNC: 0.59 MG/DL (ref 0.6–1.3)
EOSINOPHIL # BLD AUTO: 0 THOUSAND/ÂΜL (ref 0–0.61)
EOSINOPHIL NFR BLD AUTO: 0 % (ref 0–6)
ERYTHROCYTE [DISTWIDTH] IN BLOOD BY AUTOMATED COUNT: 13.6 % (ref 11.6–15.1)
ETHANOL SERPL-MCNC: <10 MG/DL
GFR SERPL CREATININE-BSD FRML MDRD: 95 ML/MIN/1.73SQ M
GLUCOSE SERPL-MCNC: 122 MG/DL (ref 65–140)
HCT VFR BLD AUTO: 36.5 % (ref 34.8–46.1)
HGB BLD-MCNC: 12.2 G/DL (ref 11.5–15.4)
IMM GRANULOCYTES # BLD AUTO: 0 THOUSAND/UL (ref 0–0.2)
IMM GRANULOCYTES NFR BLD AUTO: 0 % (ref 0–2)
INR PPP: 1.09 (ref 0.84–1.19)
LIPASE SERPL-CCNC: 6 U/L (ref 11–82)
LYMPHOCYTES # BLD AUTO: 0.46 THOUSANDS/ÂΜL (ref 0.6–4.47)
LYMPHOCYTES NFR BLD AUTO: 14 % (ref 14–44)
MAGNESIUM SERPL-MCNC: 2.1 MG/DL (ref 1.9–2.7)
MCH RBC QN AUTO: 28.3 PG (ref 26.8–34.3)
MCHC RBC AUTO-ENTMCNC: 33.4 G/DL (ref 31.4–37.4)
MCV RBC AUTO: 85 FL (ref 82–98)
METHADONE UR QL: NEGATIVE
MONOCYTES # BLD AUTO: 0.12 THOUSAND/ÂΜL (ref 0.17–1.22)
MONOCYTES NFR BLD AUTO: 4 % (ref 4–12)
NEUTROPHILS # BLD AUTO: 2.73 THOUSANDS/ÂΜL (ref 1.85–7.62)
NEUTS SEG NFR BLD AUTO: 82 % (ref 43–75)
NRBC BLD AUTO-RTO: 0 /100 WBCS
OPIATES UR QL SCN: POSITIVE
OXYCODONE+OXYMORPHONE UR QL SCN: NEGATIVE
PCP UR QL: NEGATIVE
PHOSPHATE SERPL-MCNC: 4.1 MG/DL (ref 2.3–4.1)
PLATELET # BLD AUTO: 268 THOUSANDS/UL (ref 149–390)
PMV BLD AUTO: 9.9 FL (ref 8.9–12.7)
POTASSIUM SERPL-SCNC: 4 MMOL/L (ref 3.5–5.3)
PROCALCITONIN SERPL-MCNC: 0.11 NG/ML
PROT SERPL-MCNC: 6 G/DL (ref 6.4–8.4)
PROTHROMBIN TIME: 14.2 SECONDS (ref 11.6–14.5)
RBC # BLD AUTO: 4.31 MILLION/UL (ref 3.81–5.12)
SODIUM SERPL-SCNC: 138 MMOL/L (ref 135–147)
THC UR QL: NEGATIVE
TSH SERPL DL<=0.05 MIU/L-ACNC: 0.9 UIU/ML (ref 0.45–4.5)
WBC # BLD AUTO: 3.32 THOUSAND/UL (ref 4.31–10.16)

## 2023-05-02 RX ORDER — CEFTRIAXONE 1 G/50ML
1000 INJECTION, SOLUTION INTRAVENOUS EVERY 24 HOURS
Status: DISCONTINUED | OUTPATIENT
Start: 2023-05-02 | End: 2023-05-03

## 2023-05-02 RX ORDER — METRONIDAZOLE 500 MG/100ML
500 INJECTION, SOLUTION INTRAVENOUS EVERY 8 HOURS SCHEDULED
Status: DISCONTINUED | OUTPATIENT
Start: 2023-05-02 | End: 2023-05-03

## 2023-05-02 RX ADMIN — MIDODRINE HYDROCHLORIDE 10 MG: 5 TABLET ORAL at 12:30

## 2023-05-02 RX ADMIN — SODIUM CHLORIDE 100 ML/HR: 0.9 INJECTION, SOLUTION INTRAVENOUS at 16:26

## 2023-05-02 RX ADMIN — ASPIRIN 81 MG: 81 TABLET, COATED ORAL at 09:01

## 2023-05-02 RX ADMIN — DULOXETINE HYDROCHLORIDE 60 MG: 60 CAPSULE, DELAYED RELEASE ORAL at 09:01

## 2023-05-02 RX ADMIN — GABAPENTIN 300 MG: 300 CAPSULE ORAL at 21:24

## 2023-05-02 RX ADMIN — QUETIAPINE FUMARATE 100 MG: 100 TABLET ORAL at 00:57

## 2023-05-02 RX ADMIN — METRONIDAZOLE 500 MG: 500 INJECTION, SOLUTION INTRAVENOUS at 23:52

## 2023-05-02 RX ADMIN — GABAPENTIN 300 MG: 300 CAPSULE ORAL at 00:57

## 2023-05-02 RX ADMIN — GABAPENTIN 300 MG: 300 CAPSULE ORAL at 09:02

## 2023-05-02 RX ADMIN — METRONIDAZOLE 500 MG: 500 INJECTION, SOLUTION INTRAVENOUS at 16:26

## 2023-05-02 RX ADMIN — CEFTRIAXONE 1000 MG: 1 INJECTION, SOLUTION INTRAVENOUS at 09:01

## 2023-05-02 RX ADMIN — SUCRALFATE 1 G: 1 TABLET ORAL at 12:30

## 2023-05-02 RX ADMIN — MORPHINE SULFATE 2 MG: 2 INJECTION, SOLUTION INTRAMUSCULAR; INTRAVENOUS at 12:31

## 2023-05-02 RX ADMIN — SUCRALFATE 1 G: 1 TABLET ORAL at 00:57

## 2023-05-02 RX ADMIN — SUCRALFATE 1 G: 1 TABLET ORAL at 06:11

## 2023-05-02 RX ADMIN — HEPARIN SODIUM 5000 UNITS: 5000 INJECTION INTRAVENOUS; SUBCUTANEOUS at 23:52

## 2023-05-02 RX ADMIN — HEPARIN SODIUM 5000 UNITS: 5000 INJECTION INTRAVENOUS; SUBCUTANEOUS at 00:56

## 2023-05-02 RX ADMIN — HEPARIN SODIUM 5000 UNITS: 5000 INJECTION INTRAVENOUS; SUBCUTANEOUS at 16:26

## 2023-05-02 RX ADMIN — SODIUM CHLORIDE 100 ML/HR: 0.9 INJECTION, SOLUTION INTRAVENOUS at 00:56

## 2023-05-02 RX ADMIN — QUETIAPINE FUMARATE 100 MG: 100 TABLET ORAL at 21:24

## 2023-05-02 RX ADMIN — LEVOTHYROXINE SODIUM 50 MCG: 50 TABLET ORAL at 06:11

## 2023-05-02 RX ADMIN — FLUTICASONE PROPIONATE 1 PUFF: 110 AEROSOL, METERED RESPIRATORY (INHALATION) at 21:24

## 2023-05-02 RX ADMIN — SUCRALFATE 1 G: 1 TABLET ORAL at 16:26

## 2023-05-02 RX ADMIN — PANTOPRAZOLE SODIUM 40 MG: 40 TABLET, DELAYED RELEASE ORAL at 06:11

## 2023-05-02 RX ADMIN — GABAPENTIN 300 MG: 300 CAPSULE ORAL at 16:26

## 2023-05-02 RX ADMIN — HEPARIN SODIUM 5000 UNITS: 5000 INJECTION INTRAVENOUS; SUBCUTANEOUS at 09:01

## 2023-05-02 RX ADMIN — MIDODRINE HYDROCHLORIDE 10 MG: 5 TABLET ORAL at 06:12

## 2023-05-02 RX ADMIN — METRONIDAZOLE 500 MG: 500 INJECTION, SOLUTION INTRAVENOUS at 09:01

## 2023-05-02 RX ADMIN — SUCRALFATE 1 G: 1 TABLET ORAL at 21:24

## 2023-05-02 RX ADMIN — ATORVASTATIN CALCIUM 20 MG: 20 TABLET, FILM COATED ORAL at 09:01

## 2023-05-02 NOTE — RESPIRATORY THERAPY NOTE
RT Protocol Note  Augustina Colon 78 y o  female MRN: [id number]  Unit/Bed#: [address] [place] [address] Encounter: [id number]    Assessment    Active Problems:    * No active hospital problems   *      Home Pulmonary Medications:  Albuterol inhaler PRN       Past Medical History:   Diagnosis Date   • Abdominal adhesions    • Anesthesia complication     difficult to wake up   • Anxiety    • Arthritis    • Cancer (HCC)     melanoma   • Colon polyp    • Depression    • Depression    • Disease of thyroid gland    • Fibromyalgia    • Fibromyalgia, primary    • Fractures 2006   • GERD (gastroesophageal reflux disease)    • History of cholecystectomy 09/07/2019   • Hyperlipidemia    • Irregular heart beat     can be tachy; also has orthoststic hypotension   • Lazy eye     resolved: 3/27/17   • Medical clearance for psychiatric admission 07/13/2021   • Melanoma ([facility] [address] ) 2010   • Osteoarthritis 07/2018   • Osteopenia     with joint pain-elevated DEV   • Psychiatric disorder    • Shortness of breath     assoc with tachycardia   • Stomach disorder 1995   • Tinnitus    • Wears glasses     for reading     Social History     Socioeconomic History   • Marital status: /Civil Union     Spouse name: None   • Number of children: None   • Years of education: None   • Highest education level: None   Occupational History   • None   Tobacco Use   • Smoking status: Never   • Smokeless tobacco: Never   Vaping Use   • Vaping Use: Never used   Substance and Sexual Activity   • Alcohol use: Not Currently   • Drug use: No   • Sexual activity: Not Currently     Partners: Male   Other Topics Concern   • None   Social History Narrative   • None     Social Determinants of Health     Financial Resource Strain: Not on file   Food Insecurity: Not on file   Transportation Needs: Not on file   Physical Activity: Not on file   Stress: Not on file   Social Connections: Not on file   Intimate Partner Violence: Not on file   Housing Stability: Not on file Subjective         Objective    Physical Exam:   Assessment Type: Assess only  General Appearance: Awake, Alert  Respiratory Pattern: Normal  Chest Assessment: Chest expansion symmetrical  Bilateral Breath Sounds: Diminished  Cough: None  O2 Device: RA    Vitals:  Blood pressure 139/72, pulse 89, temperature 97 6 °F (36 4 °C), resp  rate 17, SpO2 97 %                O2 Device: RA     Plan    Respiratory Plan: Home Bronchodilator Patient pathway        Resp Comments: Patient uses inhaler PRN at home

## 2023-05-02 NOTE — CONSULTS
Consultation - Gastroenterology   Ayo Schulte 78 y o  female MRN: [id number]  Unit/Bed#: [address] [place], [place] [id number] Encounter: [id number]  05/02/23  9:45 AM    Assessment/ Plan:  1- Enteritis: Presents with diarrhea + abdominal pain + s/p syncopal event (also has history of orthostatic hypotension on midodrine)  CT with inflammatory changes involving the terminal ileum  Afebrile  No leukocytosis  Inflammatory markers CRP, ESR are elevated  Abdominal Exam relevant for tenderness at LLQ and RUQ  Diarrhea has improved  No recent dietary changes, but reports diarrhea resumed on Sunday when attempting BRAT diet  Currently normotensive     - Maintain appropriate hydration status  Replete electrolytes as needed  - Continue antiemetics  - IBD flare vs  infectious etiology; obtain fecal calprotectin  Follow-up enteric panel PCR and C  Diff PCR EIA  - Continue IV antibiotics for gram negative and anerobic coverage until results from enteric panel   - Advance diet as tolerated; will attempt clear liquid diet  - Monitor serial abdominal examinations and for systemic signs of sepsis  2- Syncopal event: likely resulting from a combination of volume depletion from GI losses and baseline orthostatic hypotension  CTH negative  On midodrine  Received fluids  BP stable  3- GERD: prior EGD in 11/2020 with possible short segment of Colunga's and mild gastritis, with Bx negative for both H  pyloi and celiac disease  4- Colonic polyp: tubular adenomatous polyp  and one 7mm sessile polyp on colonoscopy from July 2022  History of Present Illness   Physician Requesting Consult: Erline Sicard, DO    Reason for Consult / Principal Problem:  Abdominal pain  Diarrhea  HPI: Ayo Schulte is a 78y o  year old female who presents with abdominal pain, diarrhea, nausea without emesis, and anorexia worse since the Friday prior to presentation  Patient also sustained a syncopal episode prior to initial presentation        Patient has abeen dealing with nausea, vomiting, constipation, abdominal pain for sometime now; most recent OP GI eval, patient was noted for inflammatory findings suggestive of enteritis on CT back in February during ED visit, but with consideration given to IBD as a possible diagnosis  As patient was with more acute presentation, ambulatory GI thought this was more likely related to an infectious etiology, with recommendation at that time made for further work-up such as CT or MR enterography for further evaluation  Has had prior surgery for lysis of adhesions and 2 bowel obstructions in the past that required NG tube decompression  Inpatient consult to gastroenterology  Consult performed by: Yuliya Munoz MD  Consult ordered by: Kriss Mann MD          Review of Systems   Constitutional: Positive for appetite change  Respiratory: Negative for shortness of breath  Cardiovascular: Negative for chest pain  Gastrointestinal: Positive for abdominal pain, diarrhea and nausea  Negative for blood in stool  Genitourinary: Negative for dysuria  Skin: Negative for color change  Neurological: Positive for syncope  Psychiatric/Behavioral: Negative for agitation and behavioral problems         Historical Information   Past Medical History:   Diagnosis Date   • Abdominal adhesions    • Anesthesia complication     difficult to wake up   • Anxiety    • Arthritis    • Cancer (HCC)     melanoma   • Colon polyp    • Depression    • Depression    • Disease of thyroid gland    • Fibromyalgia    • Fibromyalgia, primary    • Fractures 2006   • GERD (gastroesophageal reflux disease)    • History of cholecystectomy 09/07/2019   • Hyperlipidemia    • Irregular heart beat     can be tachy; also has orthoststic hypotension   • Lazy eye     resolved: 3/27/17   • Medical clearance for psychiatric admission 07/13/2021   • Melanoma ([facility] [address] ) 2010   • Osteoarthritis 07/2018   • Osteopenia     with joint pain-elevated DEV   • Psychiatric disorder    • Shortness of breath     assoc with tachycardia   • Stomach disorder 1995   • Tinnitus    • Wears glasses     for reading     Past Surgical History:   Procedure Laterality Date   • ABDOMINAL SURGERY      lysis of adhesions x 2   • APPENDECTOMY     • CERVICAL FUSION      May 5,2021 and Jan 01,2020   • CHOLECYSTECTOMY      open   • COLONOSCOPY     • DILATION AND CURETTAGE OF UTERUS     • FOOT SURGERY  05/2017   • NECK SURGERY  01/2019 5/2021   • NEUROMA EXCISION Right 05/26/2017    Procedure: EXCISION MASS / FIBROMA FOOT;  Surgeon: Bubba Carreon DPM;  Location: [address] [place] [address];  Service:    • PARS PLANA VITRECTOMY W/ REPAIR OF MACULAR HOLE  12/23/2020   • MS XCAPSL CTRC RMVL INSJ IO LENS PROSTH W/O ECP Left 12/06/2021    Procedure: EXTRACTION EXTRACAPSULAR CATARACT PHACO INTRAOCULAR LENS (IOL);   Surgeon: Adelso Chowdary MD;  Location: [facility] MAIN OR;  Service: Ophthalmology   • RIGHT OOPHORECTOMY     • WISDOM TOOTH EXTRACTION      x4     Social History     Substance and Sexual Activity   Alcohol Use Not Currently     Social History     Substance and Sexual Activity   Drug Use No     Social History     Tobacco Use   Smoking Status Never   Smokeless Tobacco Never       Family History:   Family History   Problem Relation Age of Onset   • Heart disease Mother    • Stroke Mother 58   • COPD Mother    • Arthritis Mother    • Hypertension Father    • Kidney disease Brother         kidney transplant   • Multiple sclerosis Sister    • No Known Problems Maternal Aunt    • No Known Problems Maternal Uncle    • No Known Problems Paternal Aunt    • No Known Problems Paternal Uncle    • No Known Problems Maternal Grandmother    • No Known Problems Maternal Grandfather    • No Known Problems Paternal Grandmother    • No Known Problems Paternal Grandfather    • Dislocations Sister    • Neurological problems Sister    • Scoliosis Sister    • ADD / ADHD Neg Hx    • Anesthesia problems Neg Hx    • Cancer Neg Hx    • Clotting disorder Neg Hx    • Collagen disease Neg Hx    • Diabetes Neg Hx    • Dislocations Neg Hx    • Learning disabilities Neg Hx    • Neurological problems Neg Hx    • Osteoporosis Neg Hx    • Rheumatologic disease Neg Hx    • Scoliosis Neg Hx    • Vascular Disease Neg Hx        Meds/Allergies   all current active meds have been reviewed  Allergies   Allergen Reactions   • Meperidine Lightheadedness and Other (See Comments)   • Sulfa Antibiotics Hives       Objective   Vitals: Blood pressure 129/78, pulse 86, temperature (!) 97 4 °F (36 3 °C), resp  rate 17, SpO2 95 %  , There is no height or weight on file to calculate BMI ,   Orthostatic Blood Pressures    Flowsheet Row Most Recent Value   Blood Pressure 129/78 filed at 05/02/2023 0843   Patient Position - Orthostatic VS Lying filed at 05/01/2023 9072          Systolic (23JLN), LVO:499 , Min:113 , TPU:105     Diastolic (02TOR), TXM:64, Min:56, Max:98        Intake/Output Summary (Last 24 hours) at 5/2/2023 0945  Last data filed at 5/1/2023 1738  Gross per 24 hour   Intake 50 ml   Output --   Net 50 ml       Invasive Devices     Peripheral Intravenous Line  Duration           Peripheral IV 05/01/23 Right Antecubital <1 day                    Physical Exam:  GEN: Alert and oriented x 3, in no acute distress  Well appearing and well nourished  HEENT: Sclera anicteric, conjunctivae pink, mucous membranes moist  Oropharynx clear  NECK: Supple, no carotid bruits, no significant JVD  Trachea midline, no thyromegaly  HEART: Regular rhythm, normal S1 and S2, no murmurs, clicks, gallops or rubs  PMI nondisplaced, no thrills  LUNGS: Clear to auscultation bilaterally; no wheezes, rales, or rhonchi  No increased work of breathing or signs of respiratory distress  ABDOMEN: Soft, tender on palpation or LLQ and RUQ  Nondistended, normoactive bowel sounds  EXTREMITIES: Skin warm and well perfused, no clubbing, cyanosis, or edema  NEURO: No focal findings  Normal speech  Mood and affect normal    SKIN: Normal without suspicious lesions on exposed skin        Lab Results    Troponins:       CBC with diff:   Results from last 7 days   Lab Units 05/02/23  0622 05/01/23  1344   WBC Thousand/uL 3 32* 4 16*   HEMOGLOBIN g/dL 12 2 14 3   HEMATOCRIT % 36 5 42 5   MCV fL 85 85   PLATELETS Thousands/uL 268 249   MCH pg 28 3 28 5   MCHC g/dL 33 4 33 6   RDW % 13 6 14 3   MPV fL 9 9 10 4   NRBC AUTO /100 WBCs 0 0         CMP:   Results from last 7 days   Lab Units 05/02/23  0622 05/01/23  1344   POTASSIUM mmol/L 4 0 4 6   CHLORIDE mmol/L 107 103   CO2 mmol/L 22 21   BUN mg/dL 15 15   CREATININE mg/dL 0 59* 0 82   CALCIUM mg/dL 8 1* 9 3   AST U/L 17 37   ALT U/L 22 30   ALK PHOS U/L 67 74   EGFR ml/min/1 73sq m 95 74

## 2023-05-02 NOTE — PLAN OF CARE
Problem: INFECTION - ADULT  Goal: Absence of fever/infection during neutropenic period  Description: INTERVENTIONS:  - Monitor WBC    Outcome: Progressing     Problem: Potential for Falls  Goal: Patient will remain free of falls  Description: INTERVENTIONS:  - Educate patient/family on patient safety including physical limitations  - Instruct patient to call for assistance with activity   - Consult OT/PT to assist with strengthening/mobility   - Keep Call bell within reach  - Keep bed low and locked with side rails adjusted as appropriate  - Keep care items and personal belongings within reach  - Initiate and maintain comfort rounds  - Make Fall Risk Sign visible to staff  - Offer Toileting every 2 Hours, in advance of need  - Initiate/Maintain bed alarm  - Obtain necessary fall risk management equipment: bed/chair alarm  - Apply yellow socks and bracelet for high fall risk patients  - Consider moving patient to room near nurses station  Outcome: Progressing     Problem: DISCHARGE PLANNING  Goal: Discharge to home or other facility with appropriate resources  Description: INTERVENTIONS:  - Identify barriers to discharge w/patient and caregiver  - Arrange for needed discharge resources and transportation as appropriate  - Identify discharge learning needs (meds, wound care, etc )  - Arrange for interpretive services to assist at discharge as needed  - Refer to Case Management Department for coordinating discharge planning if the patient needs post-hospital services based on physician/advanced practitioner order or complex needs related to functional status, cognitive ability, or social support system  Outcome: Progressing     Problem: Knowledge Deficit  Goal: Patient/family/caregiver demonstrates understanding of disease process, treatment plan, medications, and discharge instructions  Description: Complete learning assessment and assess knowledge base    Interventions:  - Provide teaching at level of understanding  - Provide teaching via preferred learning methods  Outcome: Progressing     Problem: PAIN - ADULT  Goal: Verbalizes/displays adequate comfort level or baseline comfort level  Description: Interventions:  - Encourage patient to monitor pain and request assistance  - Assess pain using appropriate pain scale  - Administer analgesics based on type and severity of pain and evaluate response  - Implement non-pharmacological measures as appropriate and evaluate response  - Consider cultural and social influences on pain and pain management  - Notify physician/advanced practitioner if interventions unsuccessful or patient reports new pain  Outcome: Progressing     Problem: INFECTION - ADULT  Goal: Absence of fever/infection during neutropenic period  Description: INTERVENTIONS:  - Monitor WBC    Outcome: Progressing

## 2023-05-02 NOTE — PROGRESS NOTES
[facility] 46  Progress Note  Name: Kerrie Leija  MRN: [id number]  Unit/Bed#: 4 [place] 416-01 I Date of Admission: 5/1/2023   Date of Service: 5/2/2023 I Hospital Day: 1    Assessment/Plan   * Diarrhea  Assessment & Plan  Pt has hx of inflammatory bowel disease and saw GI once 2 weeks ago   - she was supposed to follow up this week but was rescheduled by physician office   - on CT abdomen/pelvis noted to have mild mucosal enhancement and submucosal wall thickening most notably involving the terminal ileum - infectious enteritis vs mild changes of active IB  - continue IV Rocephin and flagyl for now  - no further diarrhea since admission, stool studies could not be collected  - clear liquid diet, IVF  - GI input appreciated  - rpt labs in aM    Syncope  Assessment & Plan  Most likely due to dehydration plus underlying known hx of orthostatic hypotension   - clinical hx does not indicate any additional etiology or cx of concern  - CT head and CT cervical spine negative for acute pathology  Orthostatic hypotension  Assessment & Plan  - on midodrine at home   - dehydration secondary to diarrhea most likely exacerbated it and resulted in LOC  - Orthostatic vitals in ER (+)  Re[eat orthos  - cw midodrine and IVF     Dehydration  Assessment & Plan  - secondary to multiple episodes of diarrhea causing dehydration   - continue IVF         VTE Pharmacologic Prophylaxis: VTE Score: 3 Moderate Risk (Score 3-4) - Pharmacological DVT Prophylaxis Ordered: heparin  Patient Centered Rounds: I performed bedside rounds with nursing staff today  Discussions with Specialists or Other Care Team Provider: yes - GI    Education and Discussions with Family / Patient: yes       Total Time Spent on Date of Encounter in care of patient: 35 minutes This time was spent on one or more of the following: performing physical exam; counseling and coordination of care; obtaining or reviewing history; documenting in the medical record; reviewing/ordering tests, medications or procedures; communicating with other healthcare professionals and discussing with patient's family/caregivers  Current Length of Stay: 1 day(s)  Current Patient Status: Inpatient   Certification Statement: The patient will continue to require additional inpatient hospital stay due to Diarrhea - eneteritis vs IBD requiring advancement of diet  Discharge Plan: Anticipate discharge in 24-48 hrs to home  Code Status: Level 3 - DNAR and DNI    Subjective:     REports some abdominal pain  Denies any further diarrhea since admission    Objective:     Vitals:   Temp (24hrs), Av 8 °F (36 6 °C), Min:97 4 °F (36 3 °C), Max:99 °F (37 2 °C)    Temp:  [97 4 °F (36 3 °C)-99 °F (37 2 °C)] 97 4 °F (36 3 °C)  HR:  [] 86  Resp:  [14-24] 17  BP: (113-159)/(56-98) 129/78  SpO2:  [94 %-100 %] 95 %  There is no height or weight on file to calculate BMI  Input and Output Summary (last 24 hours): Intake/Output Summary (Last 24 hours) at 2023 1053  Last data filed at 2023 1738  Gross per 24 hour   Intake 50 ml   Output --   Net 50 ml       Physical Exam:   Physical Exam  Constitutional:       General: She is not in acute distress  Appearance: She is well-developed  She is not ill-appearing  HENT:      Head: Normocephalic and atraumatic  Eyes:      General: No scleral icterus  Cardiovascular:      Rate and Rhythm: Normal rate and regular rhythm  Pulmonary:      Effort: Pulmonary effort is normal  No respiratory distress  Breath sounds: Normal breath sounds  No wheezing or rales  Abdominal:      General: Bowel sounds are normal  There is no distension  Palpations: Abdomen is soft  Tenderness: There is abdominal tenderness (generalized in lower abdomen)  There is no guarding or rebound  Neurological:      Mental Status: She is alert and oriented to person, place, and time            Additional Data:     Labs:  Results from last 7 days   Lab Units 05/02/23  0622   WBC Thousand/uL 3 32*   HEMOGLOBIN g/dL 12 2   HEMATOCRIT % 36 5   PLATELETS Thousands/uL 268   NEUTROS PCT % 82*   LYMPHS PCT % 14   MONOS PCT % 4   EOS PCT % 0     Results from last 7 days   Lab Units 05/02/23  0622   SODIUM mmol/L 138   POTASSIUM mmol/L 4 0   CHLORIDE mmol/L 107   CO2 mmol/L 22   BUN mg/dL 15   CREATININE mg/dL 0 59*   ANION GAP mmol/L 9   CALCIUM mg/dL 8 1*   ALBUMIN g/dL 3 3*   TOTAL BILIRUBIN mg/dL 0 35   ALK PHOS U/L 67   ALT U/L 22   AST U/L 17   GLUCOSE RANDOM mg/dL 122     Results from last 7 days   Lab Units 05/02/23  0622   INR  1 09             Results from last 7 days   Lab Units 05/02/23  0622 05/01/23  1344   LACTIC ACID mmol/L  --  1 6   PROCALCITONIN ng/ml 0 11  --        Lines/Drains:  Invasive Devices     Peripheral Intravenous Line  Duration           Peripheral IV 05/01/23 Right Antecubital <1 day              Imaging: Reviewed radiology reports from this admission including: abdominal/pelvic CT    Recent Cultures (last 7 days):   Results from last 7 days   Lab Units 05/01/23  1407 05/01/23  1344   BLOOD CULTURE  Received in Microbiology Lab  Culture in Progress  Received in Microbiology Lab  Culture in Progress         Last 24 Hours Medication List:   Current Facility-Administered Medications   Medication Dose Route Frequency Provider Last Rate   • acetaminophen  650 mg Oral Q6H PRN Gaby Cesar MD     • albuterol  2 puff Inhalation Q6H PRN Gaby Cesar MD     • aluminum-magnesium hydroxide-simethicone  30 mL Oral Q6H PRN Gaby Cesar MD     • aspirin  81 mg Oral Daily Gaby Cesar MD     • atorvastatin  20 mg Oral Daily Gaby Cesar MD     • cefTRIAXone  1,000 mg Intravenous Q24H Gaby Cesar MD 1,000 mg (05/02/23 0901)   • DULoxetine  60 mg Oral Daily Gaby Cesar MD     • fluticasone  1 puff Inhalation Q12H Lola García MD     • gabapentin  300 mg Oral TID Gaby Cesar MD     • heparin (porcine)  5,000 Units Subcutaneous Q8H Diallo Yi Mark Waller MD     • levothyroxine  50 mcg Oral Early Morning Cory Ashraf MD     • magnesium hydroxide  30 mL Oral Daily PRN Cory Ashraf MD     • metroNIDAZOLE  500 mg Intravenous [facility] Cory Ashraf  mg (05/02/23 0901)   • midodrine  10 mg Oral BID before breakfast/lunch Cory Ashraf MD     • morphine injection  2 mg Intravenous Q6H PRN Cory Ashraf MD     • morphine injection  4 mg Intravenous Q6H PRN Cory Ashraf MD     • ondansetron  4 mg Intravenous Q6H PRN Cory Ashraf MD     • pantoprazole  40 mg Oral Early Morning Cory Ashraf MD     • pneumococcal 20-roderick conj vacc  0 5 mL Intramuscular Once Cory Ashraf MD     • QUEtiapine  100 mg Oral HS Cory Ashraf MD     • sodium chloride  100 mL/hr Intravenous Continuous Cory Ashraf  mL/hr (05/02/23 0056)   • sucralfate  1 g Oral 4x Daily (AC & HS) Cory Ashraf MD          Today, Patient Was Seen By: Dana Bishop DO    **Please Note: This note may have been constructed using a voice recognition system  **

## 2023-05-02 NOTE — UTILIZATION REVIEW
Initial Clinical Review    Admission: Date/Time/Statement:   Admission Orders (From admission, onward)     Ordered        05/01/23 1724  INPATIENT ADMISSION  Once                      Orders Placed This Encounter   Procedures   • INPATIENT ADMISSION     Standing Status:   Standing     Number of Occurrences:   1     Order Specific Question:   Level of Care     Answer:   Med Surg [16]     Order Specific Question:   Estimated length of stay     Answer:   More than 2 Midnights     Order Specific Question:   Certification     Answer:   I certify that inpatient services are medically necessary for this patient for a duration of greater than two midnights  See H&P and MD Progress Notes for additional information about the patient's course of treatment  ED Arrival Information     Expected   5/1/2023     Arrival   5/1/2023 12:55    Acuity   Emergent            Means of arrival   Walk-In    Escorted by   Self    Service   Hospitalist    Admission type   Emergency            Arrival complaint   diarrhea           Chief Complaint   Patient presents with   • Syncope     States sent from [facility]  Started on 4/28 with abdominal cramping and diarrhea  Nausea , no vomiting  States diarrhea every hour  States became dizzy on 4/29 and passed out striking head on kitchen floor  Pain forehead, no neck pain  , pale   • Diarrhea       Initial Presentation: 78 y o  female to ED presents for generalized weakness and diarrhea x3 days  Abdominal pain is diffuse but more pronounced in right side and tender on deep palpation with no signs or symptoms to suggest acute abdomen  Unable to tolerate po  Has immediate BM with watery stools after eating  PMH for  IBS, orthostatic hypotension, hypothyroidism melanoma, dyslipidemia, anxiety, depression  IBS 2 wks ago and seen by GI  Admit Inpatient level of care for Diarrhea, Syncope, Dehydration and Orthostatic hypotension  Start Iv antibiotics    Stool studies  Liquid diet  IVFs   Zofran and PPI  GI consult  Continue home midodrine  CT abdomen pelvis -CT abdomen pelvis shows Mild mucosal enhancement and submucosal wall thickening most notably involving the terminal ileum  Diagnostic considerations include infectious enteritis and mild changes of active inflammatory bowel disease    Date: 5/2   Day 2:   GI cons; Enteritis  diarrhea + abdominal pain? + s/p syncopal event (also has history of orthostatic hypotension on midodrine)  CT with inflammatory changes involving the terminal ileum  Abdominal Exam relevant for tenderness at LLQ and RUQ  Continue IVFs and antiemetics  Continue Iv antibiotics for gram negative and anerobic coverage until results from enteric panel  Advance diet as tolerated; will attempt clear liquid diet  Monitor serial abdominal examinations and for systemic signs of sepsis  Progress notes; Continue Iv antibiotics and flagyl for now  Continue IVFs  Clear liquid diet  C/o abdominal pain       ED Triage Vitals [05/01/23 1315]   Temperature Pulse Respirations Blood Pressure SpO2   (!) 97 4 °F (36 3 °C) (!) 128 (!) 24 138/80 100 %      Temp Source Heart Rate Source Patient Position - Orthostatic VS BP Location FiO2 (%)   Tympanic Monitor Sitting Left arm --      Pain Score       7          Wt Readings from Last 1 Encounters:   05/01/23 72 6 kg (160 lb)     Additional Vital Signs:   05/02/23 08:43:24 97 4 °F (36 3 °C)   Abnormal  86 -- 129/78 95 95 % -- --   05/02/23 06:12:05 -- 88 -- 128/78 95 94 % -- --   05/02/23 0505 -- 89 17 -- -- 97 % None (Room air)      05/01/23 1730 -- 98 14 145/87 111 99 % None (Room air) Lying   05/01/23 1623 -- 94 18 141/98 -- 100 % None (Room air) Lying   05/01/23 1528 -- 111   Abnormal  18 113/57 -- -- -- Standing - Orthostatic VS   05/01/23 1525 -- 98 18 120/56 -- -- -- Sitting - Orthostatic VS   05/01/23 1522 97 9 °F (36 6 °C) 90 18 137/74 -- 98 % None (Room air) Lying     Pertinent Labs/Diagnostic Test Results:   CT head without contrast   Final "Result by Kirill Crisostomo MD (05/01 1639)      No acute intracranial abnormality  Workstation performed: [id number]         CT abdomen pelvis with contrast   Final Result by Kirill Crisostomo MD (05/01 1708)      Mild mucosal enhancement and submucosal wall thickening most notably involving the terminal ileum  Diagnostic considerations include infectious enteritis and mild changes of active inflammatory bowel disease  Small and large bowel diverticulosis with no evidence for acute diverticulitis  No abdominopelvic abscess  This examination was marked \"immediate notification\" in Epic in order to begin the standard process by which the radiology reading room liaison alerts the referring practitioner           Workstation performed: [id number]           Results from last 7 days   Lab Units 05/01/23  1418   SARS-COV-2  Negative     Results from last 7 days   Lab Units 05/02/23  0622 05/01/23  1344   WBC Thousand/uL 3 32* 4 16*   HEMOGLOBIN g/dL 12 2 14 3   HEMATOCRIT % 36 5 42 5   PLATELETS Thousands/uL 268 249   NEUTROS ABS Thousands/µL 2 73 2 49         Results from last 7 days   Lab Units 05/02/23  0622 05/01/23  1344   SODIUM mmol/L 138 136   POTASSIUM mmol/L 4 0 4 6   CHLORIDE mmol/L 107 103   CO2 mmol/L 22 21   ANION GAP mmol/L 9 12   BUN mg/dL 15 15   CREATININE mg/dL 0 59* 0 82   EGFR ml/min/1 73sq m 95 74   CALCIUM mg/dL 8 1* 9 3   MAGNESIUM mg/dL 2 1 1 8*   PHOSPHORUS mg/dL 4 1  --      Results from last 7 days   Lab Units 05/02/23  0622 05/01/23  1344   AST U/L 17 37   ALT U/L 22 30   ALK PHOS U/L 67 74   TOTAL PROTEIN g/dL 6 0* 7 3   ALBUMIN g/dL 3 3* 3 9   TOTAL BILIRUBIN mg/dL 0 35 0 68         Results from last 7 days   Lab Units 05/02/23  0622 05/01/23  1344   GLUCOSE RANDOM mg/dL 122 115       Results from last 7 days   Lab Units 05/01/23  1630 05/01/23  1344   HS TNI 0HR ng/L  --  4   HS TNI 2HR ng/L 4  --    HSTNI D2 ng/L 0  --          Results from last 7 days   Lab " Units 05/02/23  0622   PROTIME seconds 14 2   INR  1 09   PTT seconds 28     Results from last 7 days   Lab Units 05/02/23  0622   TSH 3RD GENERATON uIU/mL 0 899     Results from last 7 days   Lab Units 05/02/23  0622   PROCALCITONIN ng/ml 0 11     Results from last 7 days   Lab Units 05/01/23  1344   LACTIC ACID mmol/L 1 6       Results from last 7 days   Lab Units 05/02/23  0622 05/01/23  1344   LIPASE u/L 6* 12     Results from last 7 days   Lab Units 05/01/23  1344   CRP mg/L 97 4*   SED RATE mm/hour 75*             Results from last 7 days   Lab Units 05/01/23  1916   CLARITY UA  Clear   COLOR UA  Light Yellow   SPEC GRAV UA  1 010   PH UA  5 5   GLUCOSE UA mg/dl Negative   KETONES UA mg/dl 40 (2+)*   BLOOD UA  Trace-lysed*   PROTEIN UA mg/dl Negative   NITRITE UA  Negative   BILIRUBIN UA  Negative   UROBILINOGEN UA E U /dl 0 2   LEUKOCYTES UA  Negative   WBC UA /hpf 0-1   RBC UA /hpf 0-1   BACTERIA UA /hpf None Seen   EPITHELIAL CELLS WET PREP /hpf Occasional             Results from last 7 days   Lab Units 05/01/23  1916   AMPH/METH  Negative   BARBITURATE UR  Negative   BENZODIAZEPINE UR  Negative   COCAINE UR  Negative   METHADONE URINE  Negative   OPIATE UR  Positive*   PCP UR  Negative   THC UR  Negative         Results from last 7 days   Lab Units 05/01/23  1407 05/01/23  1344   BLOOD CULTURE  Received in Microbiology Lab  Culture in Progress  Received in Microbiology Lab  Culture in Progress         ED Treatment:   Medication Administration from 05/01/2023 1253 to 05/01/2023 2204       Date/Time Order Dose Route Action     05/01/2023 1425 EDT morphine injection 4 mg 4 mg Intravenous Given     05/01/2023 1419 EDT ondansetron (ZOFRAN) injection 4 mg 4 mg Intravenous Given     05/01/2023 1407 EDT sodium chloride 0 9 % bolus 1,000 mL 1,000 mL Intravenous New Bag     05/01/2023 1523 EDT magnesium sulfate 2 g/50 mL IVPB (premix) 2 g 2 g Intravenous New Bag     05/01/2023 1608 EDT iohexol (OMNIPAQUE) 350 MG/ML injection (SINGLE-DOSE) 100 mL 100 mL Intravenous Given     05/01/2023 1740 EDT methylPREDNISolone sodium succinate (Solu-MEDROL) injection 60 mg 60 mg Intravenous Given        Past Medical History:   Diagnosis Date   • Abdominal adhesions    • Anesthesia complication     difficult to wake up   • Anxiety    • Arthritis    • Cancer (HCC)     melanoma   • Colon polyp    • Depression    • Depression    • Disease of thyroid gland    • Fibromyalgia    • Fibromyalgia, primary    • Fractures 2006   • GERD (gastroesophageal reflux disease)    • History of cholecystectomy 09/07/2019   • Hyperlipidemia    • Irregular heart beat     can be tachy; also has orthoststic hypotension   • Lazy eye     resolved: 3/27/17   • Medical clearance for psychiatric admission 07/13/2021   • Melanoma ([facility] [address] ) 2010   • Osteoarthritis 07/2018   • Osteopenia     with joint pain-elevated DEV   • Psychiatric disorder    • Shortness of breath     assoc with tachycardia   • Stomach disorder 1995   • Tinnitus    • Wears glasses     for reading     Present on Admission:  • Orthostatic hypotension  • Diarrhea  • Syncope  • Dehydration      Admitting Diagnosis: Orthostatic hypotension [I95 1]  Diarrhea [R19 7]  Dehydration [E86 0]  Inflammatory bowel disease [K52 9]  Syncope [R55]  Abdominal pain [R10 9]  Age/Sex: 78 y o  female     Admission Orders:  Scheduled Medications:  aspirin, 81 mg, Oral, Daily  atorvastatin, 20 mg, Oral, Daily  cefTRIAXone, 1,000 mg, Intravenous, Q24H  DULoxetine, 60 mg, Oral, Daily  fluticasone, 1 puff, Inhalation, Q12H KIRBY  gabapentin, 300 mg, Oral, TID  heparin (porcine), 5,000 Units, Subcutaneous, Q8H  levothyroxine, 50 mcg, Oral, Early Morning  metroNIDAZOLE, 500 mg, Intravenous, Q8H KIRBY  midodrine, 10 mg, Oral, BID before breakfast/lunch  pantoprazole, 40 mg, Oral, Early Morning  pneumococcal 20-roderick conj vacc, 0 5 mL, Intramuscular, Once  QUEtiapine, 100 mg, Oral, HS  sucralfate, 1 g, Oral, 4x Daily (AC & HS)      Continuous IV Infusions:  sodium chloride, 100 mL/hr, Intravenous, Continuous      PRN Meds:  acetaminophen, 650 mg, Oral, Q6H PRN  albuterol, 2 puff, Inhalation, Q6H PRN  aluminum-magnesium hydroxide-simethicone, 30 mL, Oral, Q6H PRN  magnesium hydroxide, 30 mL, Oral, Daily PRN  morphine injection, 2 mg, Intravenous, Q6H PRN  morphine injection, 4 mg, Intravenous, Q6H PRN  ondansetron, 4 mg, Intravenous, Q6H PRN      NPO; Sips with meds  Occult bld, 1-3, stool  Calprotectin, Fecal  Tele monitoring  IP CONSULT TO GASTROENTEROLOGY    Network Utilization Review Department  ATTENTION: Please call with any questions or concerns to [phone] and carefully listen to the prompts so that you are directed to the right person  All voicemails are confidential   [facility] all requests for admission clinical reviews, approved or denied determinations and any other requests to dedicated fax number below belonging to the campus where the patient is receiving treatment   List of dedicated fax numbers for the Facilities:  [address] DENIALS (Administrative/Medical Necessity) [phone]   [address] (Maternity/NICU/Pediatrics) [phone]   2 Cristina Osorio [phone]   [facility] [place]  [phone]   [address] [place] [address] Kristen Ray 28 [phone]   [address] [place] [address] [facility] 815 [facility] [phone]

## 2023-05-02 NOTE — ASSESSMENT & PLAN NOTE
- has hx of inflammatory bowel disease and saw GI once 2 weeks ago   - she was supposed to follow up this week but was rescheduled by physician office   - on CT abdomen pelvis -CT abdomen pelvis shows Mild mucosal enhancement and submucosal wall thickening most notably involving the terminal ileum   Diagnostic considerations include infectious enteritis and mild changes of active inflammatory bowel disease   - possible IBD vs infectious enteritis   - not given abx in ER - will start and can be dcd based on clinical course by day team ---> IV Rocephin and Ciprofloxacin   Stool studies sent as well   - liquid diet, zofran and PPI and IVF and IV abx   - GI consulted

## 2023-05-02 NOTE — H&P
[facility]  H&P  Name: Sourav Ruiz 78 y o  female I MRN: [id number]  Unit/Bed#: [address] [place] [address] Date of Admission: 5/1/2023   Date of Service: 5/2/2023 I Hospital Day: 1      Assessment/Plan   * Diarrhea  Assessment & Plan  - has hx of inflammatory bowel disease and saw GI once 2 weeks ago   - she was supposed to follow up this week but was rescheduled by physician office   - on CT abdomen pelvis -CT abdomen pelvis shows Mild mucosal enhancement and submucosal wall thickening most notably involving the terminal ileum  Diagnostic considerations include infectious enteritis and mild changes of active inflammatory bowel disease   - possible IBD vs infectious enteritis   - not given abx in ER - will start and can be dcd based on clinical course by day team ---> IV Rocephin and Ciprofloxacin   Stool studies sent as well   - liquid diet, zofran and PPI and IVF and IV abx   - GI consulted     Syncope  Assessment & Plan  - secondary to dehydration plus underlying known hx of orthostatic hypotension   - clinical hx does not indicate any additional etiology or cx of concern and orthostatic vitals were positive as per ER sign out  - CT head and CT cervical spine negative for acute pathology   - fall precaution     Dehydration  Assessment & Plan  - secondary to multiple episodes of diarrhea causing dehydration   - known hx of orthostatic hypotension exacerbated by dehydration in spite of beng on midodrine as evidenced by positive orthostats in ER   - CT head and cervical spine negative in ER   - IVF continued and midodrine at home also continued     Orthostatic hypotension  Assessment & Plan  - on midodrine at home   - dehydration secondary to diarrhea most likely exacerbated it and resulted in LOC since in ER patient's orthostats were positive  - cw midodrine and IVF        VTE Pharmacologic Prophylaxis: VTE Score: 3 Moderate Risk (Score 3-4) - Pharmacological DVT Prophylaxis Ordered: heparin    Code Status: Level 3 - DNAR and DNI   Discussion with family: Patient declined call to   Anticipated Length of Stay: Patient will be admitted on an inpatient basis with an anticipated length of stay of greater than 2 midnights secondary to syncope colitis   Total Time Spent on Date of Encounter in care of patient: 40 minutes This time was spent on one or more of the following: performing physical exam; counseling and coordination of care; obtaining or reviewing history; documenting in the medical record; reviewing/ordering tests, medications or procedures; communicating with other healthcare professionals and discussing with patient's family/caregivers  Chief Complaint: syncope and diarrhea, dehydration     History of Present Illness:  Isidro Qureshi is a 78 y o  female with a PMH of IBS, orthostatic hypotension , hypothyroidism melanoma, dyslipidemia, anxiety, depression who came in to ER for evaluation of generalized weakness and diarrhea since 3 days  She denies any blood in stool, which are watery  Her abdominal pain is diffuse but more pronounced in right side and tender on deep palpation with no signs or symptoms to suggest acute abdomen  No fever at home, No sick contacts, vaccinated against COVID  She has no nausea or vomiting  And unable to keep anything in because when she eats she immediately has a BM with watery stools  She also reports feeling weak and had one episode of LOC in kitchen with no pre or post syncope complaints or s/s of concern  She does have hx of orthostatic hypotension and is on midodrine  CT head negative for acute pathology and CT abdomen pelvis shows Mild mucosal enhancement and submucosal wall thickening most notably involving the terminal ileum  Diagnostic considerations include infectious enteritis and mild changes of active inflammatory bowel disease      Patient is being admitted for management of intractable diarrhea, dehydration, syncope and abdominal pain  Review of Systems:  Review of Systems   Constitutional: Positive for fatigue  Gastrointestinal: Positive for abdominal pain and diarrhea  All other systems reviewed and are negative  Past Medical and Surgical History:   Past Medical History:   Diagnosis Date   • Abdominal adhesions    • Anesthesia complication     difficult to wake up   • Anxiety    • Arthritis    • Cancer (HCC)     melanoma   • Colon polyp    • Depression    • Depression    • Disease of thyroid gland    • Fibromyalgia    • Fibromyalgia, primary    • Fractures 2006   • GERD (gastroesophageal reflux disease)    • History of cholecystectomy 09/07/2019   • Hyperlipidemia    • Irregular heart beat     can be tachy; also has orthoststic hypotension   • Lazy eye     resolved: 3/27/17   • Medical clearance for psychiatric admission 07/13/2021   • Melanoma ([facility] [address] ) 2010   • Osteoarthritis 07/2018   • Osteopenia     with joint pain-elevated DEV   • Psychiatric disorder    • Shortness of breath     assoc with tachycardia   • Stomach disorder 1995   • Tinnitus    • Wears glasses     for reading       Past Surgical History:   Procedure Laterality Date   • ABDOMINAL SURGERY      lysis of adhesions x 2   • APPENDECTOMY     • CERVICAL FUSION      May 5,2021 and Jan 01,2020   • CHOLECYSTECTOMY      open   • COLONOSCOPY     • DILATION AND CURETTAGE OF UTERUS     • FOOT SURGERY  05/2017   • NECK SURGERY  01/2019 5/2021   • NEUROMA EXCISION Right 05/26/2017    Procedure: EXCISION MASS / FIBROMA FOOT;  Surgeon: Jessica Vick DPM;  Location: [address] [place] [address];  Service:    • PARS PLANA VITRECTOMY W/ REPAIR OF MACULAR HOLE  12/23/2020   • CT XCAPSL CTRC RMVL INSJ IO LENS PROSTH W/O ECP Left 12/06/2021    Procedure: EXTRACTION EXTRACAPSULAR CATARACT PHACO INTRAOCULAR LENS (IOL);   Surgeon: Moriah Rueda MD;  Location: [facility] MAIN OR;  Service: Ophthalmology   • RIGHT OOPHORECTOMY     • WISDOM TOOTH EXTRACTION      x4       Meds/Allergies:  Prior to Admission medications    Medication Sig Start Date End Date Taking?  Authorizing Provider   albuterol (ProAir HFA) 90 mcg/act inhaler Inhale 2 puffs every 6 (six) hours as needed for wheezing 5/21/22  Yes Carmen Scanlon, DO   albuterol (ProAir HFA) 90 mcg/act inhaler Inhale 2 puffs every 6 (six) hours as needed for wheezing 11/10/22  Yes SHRAVAN Jesus   atorvastatin (LIPITOR) 20 mg tablet TAKE 1 TABLET BY MOUTH EVERY DAY 9/12/22  Yes Katherine Lawton MD   calcium carbonate (OS-WILLY) 1250 (500 Ca) MG tablet Take 1,250 mg by mouth   Yes Historical Provider, MD   DULoxetine (CYMBALTA) 60 mg delayed release capsule Take 1 capsule (60 mg total) by mouth daily 12/29/22  Yes Yaneth Romero, PhD   esomeprazole (NexIUM) 40 MG capsule Take 1 capsule (40 mg total) by mouth daily before breakfast 12/9/22  Yes Mary Aviles MD   gabapentin (NEURONTIN) 300 mg capsule Take 1 capsule (300 mg total) by mouth 3 (three) times a day 12/7/22  Yes SHRAVAN Lopez   levothyroxine 50 mcg tablet TAKE 1 TABLET BY MOUTH EVERY DAY 4/24/23  Yes SHRAVAN Jesus   Magnesium 400 MG TABS Take by mouth daily   Yes Historical Provider, MD   meloxicam (MOBIC) 7 5 mg tablet Take 1 tablet (7 5 mg total) by mouth daily 4/27/23  Yes Francheska Lopez,    QUEtiapine (SEROquel) 100 mg tablet Take 1 tablet (100 mg total) by mouth daily at bedtime 12/29/22 6/27/23 Yes Yaneth Romero, PhD   acetaminophen (TYLENOL) 650 mg CR tablet if needed    Historical Provider, MD   benzonatate (TESSALON) 200 MG capsule Take 1 capsule (200 mg total) by mouth 3 (three) times a day as needed for cough  Patient not taking: Reported on 5/1/2023 1/26/23   Agnes Lockwood, DO   budesonide (Pulmicort Flexhaler) 90 MCG/ACT inhaler Inhale 1 puff 2 (two) times a day 5/1/23   Katherine Lawton MD   [facility] Aspirin Adult Low Strength 81 MG EC tablet CHEW AND SWALLOW 1 TABLET BY MOUTH ONCE DAILY 11/4/22   Historical Provider, MD   dicyclomine (BENTYL) 20 mg tablet Take 1 tablet (20 mg total) by mouth 2 (two) times a day  Patient not taking: Reported on 5/1/2023 2/8/23   Carmen Scanlon DO   methylPREDNISolone 4 MG tablet therapy pack Use as directed on package 4/4/23   SHRAVAN Luo   midodrine (PROAMATINE) 10 MG tablet Take 1 tablet (10 mg total) by mouth 3 (three) times a day  Patient taking differently: Take 10 mg by mouth 2 (two) times a day 12/2/22   Romain Suazo MD   sucralfate (CARAFATE) 1 g tablet TAKE 1 TABLET (1 G TOTAL) BY MOUTH 4 TIMES A DAY BEFORE MEALS AND AT BEDTIME 3/8/23   Naga Bautista MD     I have reviewed home medications using recent Epic encounter  Allergies:    Allergies   Allergen Reactions   • Meperidine Lightheadedness and Other (See Comments)   • Sulfa Antibiotics Hives       Social History:  Marital Status: /Civil Union      Patient Pre-hospital Living Situation: Home  Patient Pre-hospital Level of Mobility: walks     Substance Use History:   Social History     Substance and Sexual Activity   Alcohol Use Not Currently     Social History     Tobacco Use   Smoking Status Never   Smokeless Tobacco Never     Social History     Substance and Sexual Activity   Drug Use No       Family History:  Family History   Problem Relation Age of Onset   • Heart disease Mother    • Stroke Mother 58   • COPD Mother    • Arthritis Mother    • Hypertension Father    • Kidney disease Brother         kidney transplant   • Multiple sclerosis Sister    • No Known Problems Maternal Aunt    • No Known Problems Maternal Uncle    • No Known Problems Paternal Aunt    • No Known Problems Paternal Uncle    • No Known Problems Maternal Grandmother    • No Known Problems Maternal Grandfather    • No Known Problems Paternal Grandmother    • No Known Problems Paternal Grandfather    • Dislocations Sister    • Neurological problems Sister    • Scoliosis Sister    • ADD / ADHD Neg Hx    • Anesthesia problems Neg Hx    • Cancer Neg Hx    • Clotting disorder Neg Hx    • Collagen disease Neg Hx    • Diabetes Neg Hx    • Dislocations Neg Hx    • Learning disabilities Neg Hx    • Neurological problems Neg Hx    • Osteoporosis Neg Hx    • Rheumatologic disease Neg Hx    • Scoliosis Neg Hx    • Vascular Disease Neg Hx        Physical Exam:     Vitals:   Blood Pressure: 129/78 (05/02/23 0843)  Pulse: 86 (05/02/23 0843)  Temperature: (!) 97 4 °F (36 3 °C) (05/02/23 0843)  Temp Source: Tympanic (05/01/23 1623)  Respirations: 17 (05/02/23 0505)  SpO2: 95 % (05/02/23 0843)    Physical Exam  Constitutional:       Appearance: Normal appearance  HENT:      Head: Normocephalic and atraumatic  Mouth/Throat:      Mouth: Mucous membranes are moist    Eyes:      Extraocular Movements: Extraocular movements intact  Pupils: Pupils are equal, round, and reactive to light  Cardiovascular:      Rate and Rhythm: Normal rate  Heart sounds: Normal heart sounds  Pulmonary:      Breath sounds: Normal breath sounds  Abdominal:      General: Abdomen is flat  Bowel sounds are normal  There is no distension  Palpations: Abdomen is soft  There is no mass  Tenderness: There is no abdominal tenderness  There is no right CVA tenderness, left CVA tenderness, guarding or rebound  Hernia: No hernia is present  Musculoskeletal:         General: Normal range of motion  Cervical back: Normal range of motion and neck supple  Neurological:      General: No focal deficit present  Mental Status: She is alert and oriented to person, place, and time  Mental status is at baseline          Additional Data:     Lab Results:  Results from last 7 days   Lab Units 05/02/23  0622   WBC Thousand/uL 3 32*   HEMOGLOBIN g/dL 12 2   HEMATOCRIT % 36 5   PLATELETS Thousands/uL 268   NEUTROS PCT % 82*   LYMPHS PCT % 14   MONOS PCT % 4   EOS PCT % 0     Results from last 7 days   Lab Units 05/02/23  0622   SODIUM mmol/L 138   POTASSIUM mmol/L 4 0   CHLORIDE mmol/L 107   CO2 mmol/L 22   BUN mg/dL 15 "  CREATININE mg/dL 0 59*   ANION GAP mmol/L 9   CALCIUM mg/dL 8 1*   ALBUMIN g/dL 3 3*   TOTAL BILIRUBIN mg/dL 0 35   ALK PHOS U/L 67   ALT U/L 22   AST U/L 17   GLUCOSE RANDOM mg/dL 122     Results from last 7 days   Lab Units 05/02/23  0622   INR  1 09             Results from last 7 days   Lab Units 05/02/23  0622 05/01/23  1344   LACTIC ACID mmol/L  --  1 6   PROCALCITONIN ng/ml 0 11  --        Lines/Drains:  Invasive Devices     Peripheral Intravenous Line  Duration           Peripheral IV 05/01/23 Right Antecubital <1 day                    Imaging: Reviewed radiology reports from this admission including: abdominal/pelvic CT  CT head without contrast   Final Result by Dallas Johnson MD (05/01 3784)      No acute intracranial abnormality  Workstation performed: [id number]         CT abdomen pelvis with contrast   Final Result by Dallas Johnson MD (05/01 0576)      Mild mucosal enhancement and submucosal wall thickening most notably involving the terminal ileum  Diagnostic considerations include infectious enteritis and mild changes of active inflammatory bowel disease  Small and large bowel diverticulosis with no evidence for acute diverticulitis  No abdominopelvic abscess  This examination was marked \"immediate notification\" in Epic in order to begin the standard process by which the radiology reading room liaison alerts the referring practitioner  Workstation performed: [id number]             EKG and Other Studies Reviewed on Admission:   · EKG: NSR  HR 78     ** Please Note: This note has been constructed using a voice recognition system   **  "

## 2023-05-02 NOTE — ASSESSMENT & PLAN NOTE
- on midodrine at home   - dehydration secondary to diarrhea most likely exacerbated it and resulted in LOC since in ER patient's orthostats were positive  - cw midodrine and IVF

## 2023-05-02 NOTE — ASSESSMENT & PLAN NOTE
- secondary to dehydration plus underlying known hx of orthostatic hypotension   - clinical hx does not indicate any additional etiology or cx of concern and orthostatic vitals were positive as per ER sign out  - CT head and CT cervical spine negative for acute pathology   - fall precaution

## 2023-05-02 NOTE — ASSESSMENT & PLAN NOTE
- secondary to multiple episodes of diarrhea causing dehydration   - known hx of orthostatic hypotension exacerbated by dehydration in spite of beng on midodrine as evidenced by positive orthostats in ER   - CT head and cervical spine negative in ER   - IVF continued and midodrine at home also continued

## 2023-05-02 NOTE — PLAN OF CARE
Problem: Potential for Falls  Goal: Patient will remain free of falls  Description: INTERVENTIONS:  - Educate patient/family on patient safety including physical limitations  - Instruct patient to call for assistance with activity   - Consult OT/PT to assist with strengthening/mobility   - Keep Call bell within reach  - Keep bed low and locked with side rails adjusted as appropriate  - Keep care items and personal belongings within reach  - Initiate and maintain comfort rounds  - Make Fall Risk Sign visible to staff  - Offer Toileting every 2 Hours, in advance of need  - Initiate/Maintain bed alarm  - Obtain necessary fall risk management equipment: bed/chair alarm  - Apply yellow socks and bracelet for high fall risk patients  - Consider moving patient to room near nurses station  5/2/2023 1245 by Gerson Mcintyre RN  Outcome: Progressing    Problem: DISCHARGE PLANNING  Goal: Discharge to home or other facility with appropriate resources  Description: INTERVENTIONS:  - Identify barriers to discharge w/patient and caregiver  - Arrange for needed discharge resources and transportation as appropriate  - Identify discharge learning needs (meds, wound care, etc )  - Arrange for interpretive services to assist at discharge as needed  - Refer to Case Management Department for coordinating discharge planning if the patient needs post-hospital services based on physician/advanced practitioner order or complex needs related to functional status, cognitive ability, or social support system  5/2/2023 1245 by Gerson Mcintyre RN  Outcome: Progressing    Problem: Knowledge Deficit  Goal: Patient/family/caregiver demonstrates understanding of disease process, treatment plan, medications, and discharge instructions  Description: Complete learning assessment and assess knowledge base    Interventions:  - Provide teaching at level of understanding  - Provide teaching via preferred learning methods  5/2/2023 1245 by [facility] JIE Reed  Outcome: Progressing     Problem: INFECTION - ADULT  Goal: Absence of fever/infection during neutropenic period  Description: INTERVENTIONS:  - Monitor WBC    5/2/2023 1245 by Antwan Vargas RN  Outcome: Progressing     Problem: MOBILITY - ADULT  Goal: Maintain or return to baseline ADL function  Description: INTERVENTIONS:  -  Assess patient's ability to carry out ADLs; assess patient's baseline for ADL function and identify physical deficits which impact ability to perform ADLs (bathing, care of mouth/teeth, toileting, grooming, dressing, etc )  - Assess/evaluate cause of self-care deficits   - Assess range of motion  - Assess patient's mobility; develop plan if impaired  - Assess patient's need for assistive devices and provide as appropriate  - Encourage maximum independence but intervene and supervise when necessary  - Involve family in performance of ADLs  - Assess for home care needs following discharge   - Consider OT consult to assist with ADL evaluation and planning for discharge  - Provide patient education as appropriate  5/2/2023 1245 by Antwan Vargas RN  Outcome: Progressing     Problem: MOBILITY - ADULT  Goal: Maintains/Returns to pre admission functional level  Description: INTERVENTIONS:  - Perform BMAT or MOVE assessment daily    - Set and communicate daily mobility goal to care team and patient/family/caregiver  - Collaborate with rehabilitation services on mobility goals if consulted  - Perform Range of Motion 3 times a day  - Reposition patient every 2 hours    - Dangle patient 3 times a day  - Stand patient 3 times a day  - Ambulate patient 3 times a day  - Out of bed to chair 3 times a day   - Out of bed for meals 3 times a day  - Out of bed for toileting  - Record patient progress and toleration of activity level   5/2/2023 1245 by Antwan Vargas RN  Outcome: Progressing

## 2023-05-03 ENCOUNTER — TELEPHONE (OUTPATIENT)
Dept: NEUROLOGY | Facility: CLINIC | Age: 68
End: 2023-05-03

## 2023-05-03 PROBLEM — E86.0 DEHYDRATION: Status: RESOLVED | Noted: 2023-05-02 | Resolved: 2023-05-03

## 2023-05-03 LAB
ANION GAP SERPL CALCULATED.3IONS-SCNC: 4 MMOL/L (ref 4–13)
BUN SERPL-MCNC: 12 MG/DL (ref 5–25)
CALCIUM SERPL-MCNC: 7.6 MG/DL (ref 8.4–10.2)
CHLORIDE SERPL-SCNC: 110 MMOL/L (ref 96–108)
CO2 SERPL-SCNC: 27 MMOL/L (ref 21–32)
CREAT SERPL-MCNC: 0.65 MG/DL (ref 0.6–1.3)
ERYTHROCYTE [DISTWIDTH] IN BLOOD BY AUTOMATED COUNT: 14.1 % (ref 11.6–15.1)
GFR SERPL CREATININE-BSD FRML MDRD: 92 ML/MIN/1.73SQ M
GLUCOSE SERPL-MCNC: 109 MG/DL (ref 65–140)
HCT VFR BLD AUTO: 32.4 % (ref 34.8–46.1)
HGB BLD-MCNC: 10.7 G/DL (ref 11.5–15.4)
MCH RBC QN AUTO: 28.5 PG (ref 26.8–34.3)
MCHC RBC AUTO-ENTMCNC: 33 G/DL (ref 31.4–37.4)
MCV RBC AUTO: 86 FL (ref 82–98)
PLATELET # BLD AUTO: 220 THOUSANDS/UL (ref 149–390)
PMV BLD AUTO: 9.7 FL (ref 8.9–12.7)
POTASSIUM SERPL-SCNC: 3.4 MMOL/L (ref 3.5–5.3)
RBC # BLD AUTO: 3.75 MILLION/UL (ref 3.81–5.12)
SODIUM SERPL-SCNC: 141 MMOL/L (ref 135–147)
WBC # BLD AUTO: 4.14 THOUSAND/UL (ref 4.31–10.16)

## 2023-05-03 RX ORDER — POTASSIUM CHLORIDE 20 MEQ/1
40 TABLET, EXTENDED RELEASE ORAL ONCE
Status: COMPLETED | OUTPATIENT
Start: 2023-05-03 | End: 2023-05-03

## 2023-05-03 RX ADMIN — MORPHINE SULFATE 2 MG: 2 INJECTION, SOLUTION INTRAMUSCULAR; INTRAVENOUS at 09:45

## 2023-05-03 RX ADMIN — ATORVASTATIN CALCIUM 20 MG: 20 TABLET, FILM COATED ORAL at 09:40

## 2023-05-03 RX ADMIN — PANTOPRAZOLE SODIUM 40 MG: 40 TABLET, DELAYED RELEASE ORAL at 05:06

## 2023-05-03 RX ADMIN — METRONIDAZOLE 500 MG: 500 INJECTION, SOLUTION INTRAVENOUS at 09:39

## 2023-05-03 RX ADMIN — MIDODRINE HYDROCHLORIDE 10 MG: 5 TABLET ORAL at 06:16

## 2023-05-03 RX ADMIN — LEVOTHYROXINE SODIUM 50 MCG: 50 TABLET ORAL at 05:06

## 2023-05-03 RX ADMIN — HEPARIN SODIUM 5000 UNITS: 5000 INJECTION INTRAVENOUS; SUBCUTANEOUS at 09:40

## 2023-05-03 RX ADMIN — SUCRALFATE 1 G: 1 TABLET ORAL at 21:01

## 2023-05-03 RX ADMIN — GABAPENTIN 300 MG: 300 CAPSULE ORAL at 16:31

## 2023-05-03 RX ADMIN — SUCRALFATE 1 G: 1 TABLET ORAL at 10:52

## 2023-05-03 RX ADMIN — QUETIAPINE FUMARATE 100 MG: 100 TABLET ORAL at 21:01

## 2023-05-03 RX ADMIN — CEFTRIAXONE 1000 MG: 1 INJECTION, SOLUTION INTRAVENOUS at 10:52

## 2023-05-03 RX ADMIN — GABAPENTIN 300 MG: 300 CAPSULE ORAL at 09:40

## 2023-05-03 RX ADMIN — ASPIRIN 81 MG: 81 TABLET, COATED ORAL at 09:40

## 2023-05-03 RX ADMIN — SUCRALFATE 1 G: 1 TABLET ORAL at 16:31

## 2023-05-03 RX ADMIN — POTASSIUM CHLORIDE 40 MEQ: 1500 TABLET, EXTENDED RELEASE ORAL at 16:31

## 2023-05-03 RX ADMIN — SUCRALFATE 1 G: 1 TABLET ORAL at 06:15

## 2023-05-03 RX ADMIN — DULOXETINE HYDROCHLORIDE 60 MG: 60 CAPSULE, DELAYED RELEASE ORAL at 09:40

## 2023-05-03 RX ADMIN — HEPARIN SODIUM 5000 UNITS: 5000 INJECTION INTRAVENOUS; SUBCUTANEOUS at 16:31

## 2023-05-03 RX ADMIN — GABAPENTIN 300 MG: 300 CAPSULE ORAL at 21:01

## 2023-05-03 RX ADMIN — SODIUM CHLORIDE 100 ML/HR: 0.9 INJECTION, SOLUTION INTRAVENOUS at 05:06

## 2023-05-03 RX ADMIN — MIDODRINE HYDROCHLORIDE 10 MG: 5 TABLET ORAL at 10:52

## 2023-05-03 NOTE — PLAN OF CARE
Problem: Potential for Falls  Goal: Patient will remain free of falls  Description: INTERVENTIONS:  - Educate patient/family on patient safety including physical limitations  - Instruct patient to call for assistance with activity   - Consult OT/PT to assist with strengthening/mobility   - Keep Call bell within reach  - Keep bed low and locked with side rails adjusted as appropriate  - Keep care items and personal belongings within reach  - Initiate and maintain comfort rounds  - Make Fall Risk Sign visible to staff  - Offer Toileting every 2 Hours, in advance of need  - Initiate/Maintain bed alarm  - Obtain necessary fall risk management equipment: bed/chair alarm  - Apply yellow socks and bracelet for high fall risk patients  - Consider moving patient to room near nurses station  Outcome: Progressing     Problem: DISCHARGE PLANNING  Goal: Discharge to home or other facility with appropriate resources  Description: INTERVENTIONS:  - Identify barriers to discharge w/patient and caregiver  - Arrange for needed discharge resources and transportation as appropriate  - Identify discharge learning needs (meds, wound care, etc )  - Arrange for interpretive services to assist at discharge as needed  - Refer to Case Management Department for coordinating discharge planning if the patient needs post-hospital services based on physician/advanced practitioner order or complex needs related to functional status, cognitive ability, or social support system  Outcome: Progressing     Problem: Knowledge Deficit  Goal: Patient/family/caregiver demonstrates understanding of disease process, treatment plan, medications, and discharge instructions  Description: Complete learning assessment and assess knowledge base    Interventions:  - Provide teaching at level of understanding  - Provide teaching via preferred learning methods  Outcome: Progressing     Problem: PAIN - ADULT  Goal: Verbalizes/displays adequate comfort level or baseline comfort level  Description: Interventions:  - Encourage patient to monitor pain and request assistance  - Assess pain using appropriate pain scale  - Administer analgesics based on type and severity of pain and evaluate response  - Implement non-pharmacological measures as appropriate and evaluate response  - Consider cultural and social influences on pain and pain management  - Notify physician/advanced practitioner if interventions unsuccessful or patient reports new pain  Outcome: Progressing     Problem: INFECTION - ADULT  Goal: Absence of fever/infection during neutropenic period  Description: INTERVENTIONS:  - Monitor WBC    Outcome: Progressing     Problem: MOBILITY - ADULT  Goal: Maintain or return to baseline ADL function  Description: INTERVENTIONS:  -  Assess patient's ability to carry out ADLs; assess patient's baseline for ADL function and identify physical deficits which impact ability to perform ADLs (bathing, care of mouth/teeth, toileting, grooming, dressing, etc )  - Assess/evaluate cause of self-care deficits   - Assess range of motion  - Assess patient's mobility; develop plan if impaired  - Assess patient's need for assistive devices and provide as appropriate  - Encourage maximum independence but intervene and supervise when necessary  - Involve family in performance of ADLs  - Assess for home care needs following discharge   - Consider OT consult to assist with ADL evaluation and planning for discharge  - Provide patient education as appropriate  Outcome: Progressing  Goal: Maintains/Returns to pre admission functional level  Description: INTERVENTIONS:  - Perform BMAT or MOVE assessment daily    - Set and communicate daily mobility goal to care team and patient/family/caregiver  - Collaborate with rehabilitation services on mobility goals if consulted  - Perform Range of Motion 3 times a day  - Reposition patient every 2 hours    - Dangle patient 3 times a day  - Stand patient 3 times a day  - Ambulate patient 3 times a day  - Out of bed to chair 3 times a day   - Out of bed for meals 3 times a day  - Out of bed for toileting  - Record patient progress and toleration of activity level   Outcome: Progressing

## 2023-05-03 NOTE — PLAN OF CARE
Problem: Potential for Falls  Goal: Patient will remain free of falls  Description: INTERVENTIONS:  - Educate patient/family on patient safety including physical limitations  - Instruct patient to call for assistance with activity   - Consult OT/PT to assist with strengthening/mobility   - Keep Call bell within reach  - Keep bed low and locked with side rails adjusted as appropriate  - Keep care items and personal belongings within reach  - Initiate and maintain comfort rounds  - Make Fall Risk Sign visible to staff  - Offer Toileting every 2 Hours, in advance of need  - Initiate/Maintain bed alarm  - Obtain necessary fall risk management equipment: bed/chair alarm  - Apply yellow socks and bracelet for high fall risk patients  - Consider moving patient to room near nurses station  Outcome: Progressing     Problem: PAIN - ADULT  Goal: Verbalizes/displays adequate comfort level or baseline comfort level  Description: Interventions:  - Encourage patient to monitor pain and request assistance  - Assess pain using appropriate pain scale  - Administer analgesics based on type and severity of pain and evaluate response  - Implement non-pharmacological measures as appropriate and evaluate response  - Consider cultural and social influences on pain and pain management  - Notify physician/advanced practitioner if interventions unsuccessful or patient reports new pain  Outcome: Progressing     Problem: INFECTION - ADULT  Goal: Absence of fever/infection during neutropenic period  Description: INTERVENTIONS:  - Monitor WBC    Outcome: Progressing     Problem: MOBILITY - ADULT  Goal: Maintain or return to baseline ADL function  Description: INTERVENTIONS:  -  Assess patient's ability to carry out ADLs; assess patient's baseline for ADL function and identify physical deficits which impact ability to perform ADLs (bathing, care of mouth/teeth, toileting, grooming, dressing, etc )  - Assess/evaluate cause of self-care deficits - Assess range of motion  - Assess patient's mobility; develop plan if impaired  - Assess patient's need for assistive devices and provide as appropriate  - Encourage maximum independence but intervene and supervise when necessary  - Involve family in performance of ADLs  - Assess for home care needs following discharge   - Consider OT consult to assist with ADL evaluation and planning for discharge  - Provide patient education as appropriate  Outcome: Progressing  Goal: Maintains/Returns to pre admission functional level  Description: INTERVENTIONS:  - Perform BMAT or MOVE assessment daily    - Set and communicate daily mobility goal to care team and patient/family/caregiver  - Collaborate with rehabilitation services on mobility goals if consulted  - Perform Range of Motion 3 times a day  - Reposition patient every 2 hours    - Dangle patient 3 times a day  - Stand patient 3 times a day  - Ambulate patient 3 times a day  - Out of bed to chair 3 times a day   - Out of bed for meals 3 times a day  - Out of bed for toileting  - Record patient progress and toleration of activity level   Outcome: Progressing

## 2023-05-03 NOTE — ASSESSMENT & PLAN NOTE
Most likely due to dehydration plus underlying known hx of orthostatic hypotension   - clinical hx does not indicate any additional etiology or cx of concern  - CT head and CT cervical spine negative for acute pathology

## 2023-05-03 NOTE — PLAN OF CARE
Problem: Potential for Falls  Goal: Patient will remain free of falls  Description: INTERVENTIONS:  - Educate patient/family on patient safety including physical limitations  - Instruct patient to call for assistance with activity   - Consult OT/PT to assist with strengthening/mobility   - Keep Call bell within reach  - Keep bed low and locked with side rails adjusted as appropriate  - Keep care items and personal belongings within reach  - Initiate and maintain comfort rounds  - Make Fall Risk Sign visible to staff  - Offer Toileting every 2 Hours, in advance of need  - Initiate/Maintain bed alarm  - Obtain necessary fall risk management equipment: bed/chair alarm  - Apply yellow socks and bracelet for high fall risk patients  - Consider moving patient to room near nurses station  5/2/2023 2030 by Ko Garcia RN  Outcome: Progressing  5/2/2023 2029 by Ko Garcia RN  Outcome: Progressing     Problem: Knowledge Deficit  Goal: Patient/family/caregiver demonstrates understanding of disease process, treatment plan, medications, and discharge instructions  Description: Complete learning assessment and assess knowledge base    Interventions:  - Provide teaching at level of understanding  - Provide teaching via preferred learning methods  Outcome: Progressing     Problem: PAIN - ADULT  Goal: Verbalizes/displays adequate comfort level or baseline comfort level  Description: Interventions:  - Encourage patient to monitor pain and request assistance  - Assess pain using appropriate pain scale  - Administer analgesics based on type and severity of pain and evaluate response  - Implement non-pharmacological measures as appropriate and evaluate response  - Consider cultural and social influences on pain and pain management  - Notify physician/advanced practitioner if interventions unsuccessful or patient reports new pain  5/2/2023 2030 by Ko Garcia RN  Outcome: Progressing  5/2/2023 2029 by Monse Larose JIE Canas  Outcome: Progressing     Problem: INFECTION - ADULT  Goal: Absence of fever/infection during neutropenic period  Description: INTERVENTIONS:  - Monitor WBC    5/2/2023 2030 by John Logan RN  Outcome: Progressing  5/2/2023 2029 by John Logan RN  Outcome: Progressing     Problem: MOBILITY - ADULT  Goal: Maintain or return to baseline ADL function  Description: INTERVENTIONS:  -  Assess patient's ability to carry out ADLs; assess patient's baseline for ADL function and identify physical deficits which impact ability to perform ADLs (bathing, care of mouth/teeth, toileting, grooming, dressing, etc )  - Assess/evaluate cause of self-care deficits   - Assess range of motion  - Assess patient's mobility; develop plan if impaired  - Assess patient's need for assistive devices and provide as appropriate  - Encourage maximum independence but intervene and supervise when necessary  - Involve family in performance of ADLs  - Assess for home care needs following discharge   - Consider OT consult to assist with ADL evaluation and planning for discharge  - Provide patient education as appropriate  5/2/2023 2030 by John Logan RN  Outcome: Progressing  5/2/2023 2029 by John Logan RN  Outcome: Progressing  Goal: Maintains/Returns to pre admission functional level  Description: INTERVENTIONS:  - Perform BMAT or MOVE assessment daily    - Set and communicate daily mobility goal to care team and patient/family/caregiver  - Collaborate with rehabilitation services on mobility goals if consulted  - Perform Range of Motion 3 times a day  - Reposition patient every 2 hours    - Dangle patient 3 times a day  - Stand patient 3 times a day  - Ambulate patient 3 times a day  - Out of bed to chair 3 times a day   - Out of bed for meals 3 times a day  - Out of bed for toileting  - Record patient progress and toleration of activity level   5/2/2023 2030 by John Logan RN  Outcome: Progressing  5/2/2023 2029 by Tova Mendoza RN  Outcome: Progressing

## 2023-05-03 NOTE — ASSESSMENT & PLAN NOTE
Pt has hx of inflammatory bowel disease and saw GI once 2 weeks ago   - she was supposed to follow up this week but was rescheduled by physician office   - on CT abdomen/pelvis noted to have mild mucosal enhancement and submucosal wall thickening most notably involving the terminal ileum - infectious enteritis vs mild changes of active IB  - d/w GI and will discontinue IV Rocephin and flagyl no indication for antibiotics and can cause recurrence of diarrhea or worsen it  - no further diarrhea since admission, stool studies could not be collected  - clear liquid diet --> soft/lite meal  continue IVF  - GI input appreciated  -  Potassium repleted   rpt labs in aM

## 2023-05-03 NOTE — ASSESSMENT & PLAN NOTE
Pt has hx of inflammatory bowel disease and saw GI once 2 weeks ago   - she was supposed to follow up this week but was rescheduled by physician office   - on CT abdomen/pelvis noted to have mild mucosal enhancement and submucosal wall thickening most notably involving the terminal ileum - infectious enteritis vs mild changes of active IB  - continue IV Rocephin and flagyl for now  - no further diarrhea since admission, stool studies could not be collected  - clear liquid diet, IVF  - GI input appreciated  - rpt labs in aM

## 2023-05-03 NOTE — ASSESSMENT & PLAN NOTE
On midodrine at home   - dehydration secondary to diarrhea most likely exacerbated it and resulted in LOC  - Orthostatic vitals in ER (+)   Re[eat orthos  - continue midodrine and IVF

## 2023-05-03 NOTE — PROGRESS NOTES
[facility]  Progress Note  Name: Ashley Little  MRN: [id number]  Unit/Bed#: 4 [facility] 416-01 I Date of Admission: 5/1/2023   Date of Service: 5/3/2023 I Hospital Day: 2    Assessment/Plan   * Diarrhea  Assessment & Plan  Pt has hx of inflammatory bowel disease and saw GI once 2 weeks ago   - she was supposed to follow up this week but was rescheduled by physician office   - on CT abdomen/pelvis noted to have mild mucosal enhancement and submucosal wall thickening most notably involving the terminal ileum - infectious enteritis vs mild changes of active IB  - d/w GI and will discontinue IV Rocephin and flagyl no indication for antibiotics and can cause recurrence of diarrhea or worsen it  - no further diarrhea since admission, stool studies could not be collected  - clear liquid diet --> soft/lite meal  continue IVF  - GI input appreciated  -  Potassium repleted  rpt labs in aM    Syncope  Assessment & Plan  Most likely due to dehydration plus underlying known hx of orthostatic hypotension   - clinical hx does not indicate any additional etiology or cx of concern  - CT head and CT cervical spine negative for acute pathology    Orthostatic hypotension  Assessment & Plan  On midodrine at home   - dehydration secondary to diarrhea most likely exacerbated it and resulted in LOC  - Orthostatic vitals in ER (+)  Re[eat orthos  - continue midodrine and IVF     Dehydration-resolved as of 5/3/2023  Assessment & Plan  - secondary to multiple episodes of diarrhea causing dehydration   - continue IVF  VTE Pharmacologic Prophylaxis: VTE Score: 3 Moderate Risk (Score 3-4) - Pharmacological DVT Prophylaxis Ordered: heparin  Patient Centered Rounds: I performed bedside rounds with nursing staff today  Discussions with Specialists or Other Care Team Provider: yes - GI    Education and Discussions with Family / Patient: yes       Total Time Spent on Date of Encounter in care of patient: 28 minutes This time was spent on one or more of the following: performing physical exam; counseling and coordination of care; obtaining or reviewing history; documenting in the medical record; reviewing/ordering tests, medications or procedures; communicating with other healthcare professionals and discussing with patient's family/caregivers  Current Length of Stay: 2 day(s)  Current Patient Status: Inpatient   Certification Statement: The patient will continue to require additional inpatient hospital stay due to Abdominal pain requiring advancement of diet and monitoring overnight  Discharge Plan: Anticipate discharge in 24-48 hrs to home  Code Status: Level 3 - DNAR and DNI    Subjective:     Patient reports abdominal pain on the right side by her button  Tolerating clears so far  No further diarrhea    Objective:     Vitals:   Temp (24hrs), Av 6 °F (36 4 °C), Min:97 5 °F (36 4 °C), Max:97 8 °F (36 6 °C)    Temp:  [97 5 °F (36 4 °C)-97 8 °F (36 6 °C)] 97 8 °F (36 6 °C)  HR:  [69-83] 72  Resp:  [20] 20  BP: ()/(50-75) 138/75  SpO2:  [91 %-96 %] 94 %  There is no height or weight on file to calculate BMI  Input and Output Summary (last 24 hours): Intake/Output Summary (Last 24 hours) at 5/3/2023 1658  Last data filed at 5/3/2023 1229  Gross per 24 hour   Intake 460 ml   Output --   Net 460 ml       Physical Exam:   Physical Exam  Constitutional:       General: She is not in acute distress  HENT:      Head: Normocephalic and atraumatic  Cardiovascular:      Rate and Rhythm: Normal rate and regular rhythm  Pulmonary:      Effort: Pulmonary effort is normal  No respiratory distress  Breath sounds: Normal breath sounds  No wheezing or rales  Abdominal:      General: Bowel sounds are normal  There is no distension  Palpations: Abdomen is soft  Tenderness: There is abdominal tenderness (right mid abdomen)  There is no guarding or rebound     Neurological:      Mental Status: She is alert and oriented to person, place, and time  Additional Data:     Labs:  Results from last 7 days   Lab Units 05/03/23  1045 05/02/23  0622   WBC Thousand/uL 4 14* 3 32*   HEMOGLOBIN g/dL 10 7* 12 2   HEMATOCRIT % 32 4* 36 5   PLATELETS Thousands/uL 220 268   NEUTROS PCT %  --  82*   LYMPHS PCT %  --  14   MONOS PCT %  --  4   EOS PCT %  --  0     Results from last 7 days   Lab Units 05/03/23  1045 05/02/23  0622   SODIUM mmol/L 141 138   POTASSIUM mmol/L 3 4* 4 0   CHLORIDE mmol/L 110* 107   CO2 mmol/L 27 22   BUN mg/dL 12 15   CREATININE mg/dL 0 65 0 59*   ANION GAP mmol/L 4 9   CALCIUM mg/dL 7 6* 8 1*   ALBUMIN g/dL  --  3 3*   TOTAL BILIRUBIN mg/dL  --  0 35   ALK PHOS U/L  --  67   ALT U/L  --  22   AST U/L  --  17   GLUCOSE RANDOM mg/dL 109 122     Results from last 7 days   Lab Units 05/02/23  0622   INR  1 09             Results from last 7 days   Lab Units 05/02/23  0622 05/01/23  1344   LACTIC ACID mmol/L  --  1 6   PROCALCITONIN ng/ml 0 11  --        Lines/Drains:  Invasive Devices     Peripheral Intravenous Line  Duration           Peripheral IV 05/01/23 Right Antecubital 2 days                      Imaging: Reviewed radiology reports from this admission including: abdominal/pelvic CT    Recent Cultures (last 7 days):   Results from last 7 days   Lab Units 05/01/23  1407 05/01/23  1344   BLOOD CULTURE  No Growth at 48 hrs  No Growth at 48 hrs         Last 24 Hours Medication List:   Current Facility-Administered Medications   Medication Dose Route Frequency Provider Last Rate   • acetaminophen  650 mg Oral Q6H PRN Anayeli Arriaza MD     • albuterol  2 puff Inhalation Q6H PRN Pleasant Chitina, MD     • aluminum-magnesium hydroxide-simethicone  30 mL Oral Q6H PRN Pleasant Chitina, MD     • aspirin  81 mg Oral Daily Anayeli Arriaza MD     • atorvastatin  20 mg Oral Daily Anayeli Arriaza MD     • DULoxetine  60 mg Oral Daily Anayeli Arriaza MD     • fluticasone  1 puff Inhalation Q12H Caio Valenzuela MD     • gabapentin  300 mg Oral TID Doyle Bell MD     • heparin (porcine)  5,000 Units Subcutaneous Q8H Doyle Bell MD     • levothyroxine  50 mcg Oral Early Morning Doyle Bell MD     • magnesium hydroxide  30 mL Oral Daily PRN Doyle Bell MD     • midodrine  10 mg Oral BID before breakfast/lunch Doyle Bell MD     • morphine injection  2 mg Intravenous Q6H PRN Doyle Bell MD     • morphine injection  4 mg Intravenous Q6H PRN Doyle Bell MD     • ondansetron  4 mg Intravenous Q6H PRN Doyle Bell MD     • pantoprazole  40 mg Oral Early Morning Doyle Bell MD     • pneumococcal 20-roderick conj vacc  0 5 mL Intramuscular Once Doyle Bell MD     • QUEtiapine  100 mg Oral HS Doyle Bell MD     • sodium chloride  75 mL/hr Intravenous Continuous Faith Revankar, DO 75 mL/hr (05/03/23 1625)   • sucralfate  1 g Oral 4x Daily (AC & HS) Doyle Bell MD          Today, Patient Was Seen By: Alexa Borja DO    **Please Note: This note may have been constructed using a voice recognition system  **

## 2023-05-03 NOTE — ASSESSMENT & PLAN NOTE
- on midodrine at home   - dehydration secondary to diarrhea most likely exacerbated it and resulted in LOC  - Orthostatic vitals in ER (+)   Re[eat orthos  - cw midodrine and IVF

## 2023-05-03 NOTE — PROGRESS NOTES
Assessment/ Plan:  1- Enteritis: Presents with diarrhea + abdominal pain + s/p syncopal event (also has history of orthostatic hypotension on midodrine)  CT with inflammatory changes involving the terminal ileum  Afebrile  No leukocytosis  Inflammatory markers CRP, ESR are elevated  Abdominal Exam relevant for tenderness at LLQ and RUQ  Diarrhea has improved  No recent dietary changes, but reports diarrhea resumed on Sunday when attempting BRAT diet       Currently normotensive     - Unable to obtain stool microbiological studies, and inflammatory markers, as patient still has not had a bowel movement  Patient does not have an overall septic picture at this time, and based on clinical assessment, this Power Pat does not believe this to be of infectious etiology, and further work-up for IBD should be conducted  In any case, will attempt to advance diet to surgical light today with low residue, and assess for improvements in bowel function  Patient would benefit from endoscopic intervention/colonoscopy for further evaluation   - Maintain appropriate hydration status  Replete electrolytes as needed  - Continue antiemetics  - IBD flare vs  infectious etiology; obtain fecal calprotectin  Follow-up enteric panel PCR and C  Diff PCR EIA  - Continue IV antibiotics for gram negative and anerobic coverage until results from enteric panel   - Advance diet as tolerated; will attempt clear liquid diet  - Monitor serial abdominal examinations and for systemic signs of sepsis      2- Syncopal event: likely resulting from a combination of volume depletion from GI losses and baseline orthostatic hypotension  CTH negative  On midodrine  Received fluids  BP stable      3- GERD: prior EGD in 11/2020 with possible short segment of Colunga's and mild gastritis, with Bx negative for both H  pyloi and celiac disease      4- Colonic polyp: tubular adenomatous polyp  and one 7mm sessile polyp on colonoscopy from July 2022          Code Status: Level 3 - DNAR and DNI      Subjective:   Patient viewed at the bedside  Still has GI upset  Has not had a bowel movement, states she had been tolerating clear liquid diet  Denies any fever or chills  Objective:     Vitals:   Temp (24hrs), Av 5 °F (36 4 °C), Min:97 4 °F (36 3 °C), Max:97 7 °F (36 5 °C)    Temp:  [97 4 °F (36 3 °C)-97 7 °F (36 5 °C)] 97 7 °F (36 5 °C)  HR:  [69-89] 69  Resp:  [20-22] 20  BP: ()/(50-73) 138/73  SpO2:  [91 %-96 %] 95 %  There is no height or weight on file to calculate BMI  Input and Output Summary (last 24 hours): Intake/Output Summary (Last 24 hours) at 5/3/2023 1217  Last data filed at 5/3/2023 0900  Gross per 24 hour   Intake 220 ml   Output --   Net 220 ml       Physical Exam:     Physical Exam  Vitals reviewed  Constitutional:       Appearance: Normal appearance  HENT:      Mouth/Throat:      Mouth: Mucous membranes are moist    Eyes:      General:         Right eye: No discharge  Left eye: No discharge  Conjunctiva/sclera: Conjunctivae normal    Cardiovascular:      Rate and Rhythm: Normal rate and regular rhythm  Heart sounds: S1 normal and S2 normal    Pulmonary:      Effort: Pulmonary effort is normal       Breath sounds: Normal breath sounds  Abdominal:      General: There is no distension  Palpations: Abdomen is soft  Tenderness: There is abdominal tenderness  There is no rebound  Musculoskeletal:         General: No swelling or tenderness  Cervical back: Neck supple  Skin:     General: Skin is warm and dry  Neurological:      Mental Status: She is alert and oriented to person, place, and time     Psychiatric:         Mood and Affect: Mood normal          Behavior: Behavior normal          Additional Data:     Labs:    Results from last 7 days   Lab Units 23  1045 23  0622   WBC Thousand/uL 4 14* 3 32*   HEMOGLOBIN g/dL 10 7* 12 2   HEMATOCRIT % 32 4* 36 5   PLATELETS Thousands/uL 220 268   NEUTROS PCT %  --  82*   LYMPHS PCT %  --  14   MONOS PCT %  --  4   EOS PCT %  --  0     Results from last 7 days   Lab Units 05/03/23  1045 05/02/23  0622   POTASSIUM mmol/L 3 4* 4 0   CHLORIDE mmol/L 110* 107   CO2 mmol/L 27 22   BUN mg/dL 12 15   CREATININE mg/dL 0 65 0 59*   CALCIUM mg/dL 7 6* 8 1*   ALK PHOS U/L  --  67   ALT U/L  --  22   AST U/L  --  17     Results from last 7 days   Lab Units 05/02/23  0622   INR  1 09       * I Have Reviewed All Lab Data Listed Above  * Additional Pertinent Lab Tests Reviewed: All Labs Within Last 24 Hours Reviewed    Imaging:    Imaging Reports Reviewed Today Include: None  Imaging Personally Reviewed by Myself Includes: None  Recent Cultures (last 7 days):     Results from last 7 days   Lab Units 05/01/23  1407 05/01/23  1344   BLOOD CULTURE  No Growth at 24 hrs  No Growth at 24 hrs         Last 24 Hours Medication List:   Current Facility-Administered Medications   Medication Dose Route Frequency Provider Last Rate   • acetaminophen  650 mg Oral Q6H PRN Anastasia Hoff MD     • albuterol  2 puff Inhalation Q6H PRN Anastasia Hoff MD     • aluminum-magnesium hydroxide-simethicone  30 mL Oral Q6H PRN Anastasia Hoff MD     • aspirin  81 mg Oral Daily Anastasia Hoff MD     • atorvastatin  20 mg Oral Daily Anastasia Hoff MD     • cefTRIAXone  1,000 mg Intravenous Q24H Anastasia Hoff MD 1,000 mg (05/03/23 1052)   • DULoxetine  60 mg Oral Daily Anastasia Hoff MD     • fluticasone  1 puff Inhalation Q12H [address] Anastasia Hoff MD     • gabapentin  300 mg Oral TID Anastasia Hoff MD     • heparin (porcine)  5,000 Units Subcutaneous Q8H Anastasia Hoff MD     • levothyroxine  50 mcg Oral Early Morning Anastasia Hoff MD     • magnesium hydroxide  30 mL Oral Daily PRN Anastasia Hoff MD     • metroNIDAZOLE  500 mg Intravenous [facility] Anastasia Hoff  mg (05/03/23 0531)   • midodrine  10 mg Oral BID before breakfast/lunch Anastasia Hoff MD     • morphine injection  2 mg Intravenous Q6H PRN Anastasia Hoff, MD     • morphine injection  4 mg Intravenous Q6H PRN Grisel Daly MD     • ondansetron  4 mg Intravenous Q6H PRN Grisel Daly MD     • pantoprazole  40 mg Oral Early Morning Grisel Daly MD     • pneumococcal 20-roderick conj vacc  0 5 mL Intramuscular Once Grisel Daly MD     • QUEtiapine  100 mg Oral HS Grisel Daly MD     • sodium chloride  100 mL/hr Intravenous Continuous Grisel Daly  mL/hr (05/03/23 8951)   • sucralfate  1 g Oral 4x Daily (AC & HS) Grisel Daly MD          Today, Patient Was Seen By: Mahnaz Hardwick MD    ** Please Note: Dragon 360 Dictation voice to text software may have been used in the creation of this document   **

## 2023-05-04 LAB
ANION GAP SERPL CALCULATED.3IONS-SCNC: 6 MMOL/L (ref 4–13)
BUN SERPL-MCNC: 7 MG/DL (ref 5–25)
CALCIUM SERPL-MCNC: 8.1 MG/DL (ref 8.4–10.2)
CHLORIDE SERPL-SCNC: 112 MMOL/L (ref 96–108)
CO2 SERPL-SCNC: 25 MMOL/L (ref 21–32)
CREAT SERPL-MCNC: 0.61 MG/DL (ref 0.6–1.3)
ERYTHROCYTE [DISTWIDTH] IN BLOOD BY AUTOMATED COUNT: 13.9 % (ref 11.6–15.1)
GFR SERPL CREATININE-BSD FRML MDRD: 94 ML/MIN/1.73SQ M
GLUCOSE SERPL-MCNC: 96 MG/DL (ref 65–140)
HCT VFR BLD AUTO: 32.9 % (ref 34.8–46.1)
HGB BLD-MCNC: 10.7 G/DL (ref 11.5–15.4)
MCH RBC QN AUTO: 28.1 PG (ref 26.8–34.3)
MCHC RBC AUTO-ENTMCNC: 32.5 G/DL (ref 31.4–37.4)
MCV RBC AUTO: 86 FL (ref 82–98)
PLATELET # BLD AUTO: 211 THOUSANDS/UL (ref 149–390)
PMV BLD AUTO: 9.6 FL (ref 8.9–12.7)
POTASSIUM SERPL-SCNC: 3.8 MMOL/L (ref 3.5–5.3)
RBC # BLD AUTO: 3.81 MILLION/UL (ref 3.81–5.12)
SODIUM SERPL-SCNC: 143 MMOL/L (ref 135–147)
WBC # BLD AUTO: 4.33 THOUSAND/UL (ref 4.31–10.16)

## 2023-05-04 RX ORDER — METHOCARBAMOL 500 MG/1
500 TABLET, FILM COATED ORAL EVERY 8 HOURS SCHEDULED
Status: DISCONTINUED | OUTPATIENT
Start: 2023-05-04 | End: 2023-05-06 | Stop reason: HOSPADM

## 2023-05-04 RX ORDER — ACETAMINOPHEN 325 MG/1
975 TABLET ORAL EVERY 8 HOURS SCHEDULED
Status: DISCONTINUED | OUTPATIENT
Start: 2023-05-04 | End: 2023-05-06 | Stop reason: HOSPADM

## 2023-05-04 RX ORDER — BISACODYL 5 MG/1
10 TABLET, DELAYED RELEASE ORAL ONCE
Status: COMPLETED | OUTPATIENT
Start: 2023-05-04 | End: 2023-05-04

## 2023-05-04 RX ADMIN — SUCRALFATE 1 G: 1 TABLET ORAL at 16:29

## 2023-05-04 RX ADMIN — GABAPENTIN 300 MG: 300 CAPSULE ORAL at 08:40

## 2023-05-04 RX ADMIN — ACETAMINOPHEN 975 MG: 325 TABLET, FILM COATED ORAL at 16:29

## 2023-05-04 RX ADMIN — LEVOTHYROXINE SODIUM 50 MCG: 50 TABLET ORAL at 05:18

## 2023-05-04 RX ADMIN — SUCRALFATE 1 G: 1 TABLET ORAL at 11:55

## 2023-05-04 RX ADMIN — HEPARIN SODIUM 5000 UNITS: 5000 INJECTION INTRAVENOUS; SUBCUTANEOUS at 00:20

## 2023-05-04 RX ADMIN — MIDODRINE HYDROCHLORIDE 10 MG: 5 TABLET ORAL at 11:55

## 2023-05-04 RX ADMIN — SUCRALFATE 1 G: 1 TABLET ORAL at 21:58

## 2023-05-04 RX ADMIN — ATORVASTATIN CALCIUM 20 MG: 20 TABLET, FILM COATED ORAL at 08:40

## 2023-05-04 RX ADMIN — MIDODRINE HYDROCHLORIDE 10 MG: 5 TABLET ORAL at 06:38

## 2023-05-04 RX ADMIN — SUCRALFATE 1 G: 1 TABLET ORAL at 06:38

## 2023-05-04 RX ADMIN — HEPARIN SODIUM 5000 UNITS: 5000 INJECTION INTRAVENOUS; SUBCUTANEOUS at 16:29

## 2023-05-04 RX ADMIN — BISACODYL 10 MG: 5 TABLET, COATED ORAL at 17:49

## 2023-05-04 RX ADMIN — PANTOPRAZOLE SODIUM 40 MG: 40 TABLET, DELAYED RELEASE ORAL at 05:18

## 2023-05-04 RX ADMIN — QUETIAPINE FUMARATE 100 MG: 100 TABLET ORAL at 21:58

## 2023-05-04 RX ADMIN — DULOXETINE HYDROCHLORIDE 60 MG: 60 CAPSULE, DELAYED RELEASE ORAL at 08:40

## 2023-05-04 RX ADMIN — SODIUM CHLORIDE 75 ML/HR: 0.9 INJECTION, SOLUTION INTRAVENOUS at 07:38

## 2023-05-04 RX ADMIN — HEPARIN SODIUM 5000 UNITS: 5000 INJECTION INTRAVENOUS; SUBCUTANEOUS at 08:40

## 2023-05-04 RX ADMIN — ASPIRIN 81 MG: 81 TABLET, COATED ORAL at 08:40

## 2023-05-04 RX ADMIN — GABAPENTIN 300 MG: 300 CAPSULE ORAL at 21:58

## 2023-05-04 RX ADMIN — POLYETHYLENE GLYCOL 3350, SODIUM SULFATE ANHYDROUS, SODIUM BICARBONATE, SODIUM CHLORIDE, POTASSIUM CHLORIDE 4000 ML: 236; 22.74; 6.74; 5.86; 2.97 POWDER, FOR SOLUTION ORAL at 17:49

## 2023-05-04 RX ADMIN — METHOCARBAMOL 500 MG: 500 TABLET ORAL at 16:29

## 2023-05-04 RX ADMIN — GABAPENTIN 300 MG: 300 CAPSULE ORAL at 16:29

## 2023-05-04 NOTE — ASSESSMENT & PLAN NOTE
On midodrine at home   - dehydration secondary to diarrhea most likely exacerbated it and resulted in LOC  - Orthostatic vitals in ER (+)  - continue midodrine and IVF

## 2023-05-04 NOTE — ASSESSMENT & PLAN NOTE
Pt has hx of inflammatory bowel disease and saw GI once 2 weeks ago   - she was supposed to follow up this week but was rescheduled by physician office   - on CT abdomen/pelvis noted to have mild mucosal enhancement and submucosal wall thickening most notably involving the terminal ileum - infectious enteritis vs mild changes of active IB  - d/w GI and discontinued IV Rocephin and flagyl 5/3 as no indication for antibiotics and can cause recurrence of diarrhea or worsen it  -Reported diarrhea yesterday but none since  Today had small BM which was not diarrhea  - clear liquid diet --> soft/lite meal --> clears again continue IVF  - Per GI plan for colonoscopy tomorrow for further evaluation  - Repeat labs in a m

## 2023-05-04 NOTE — PROGRESS NOTES
Progress Note - Noe Gan 78 y o  female MRN: [id number]    Unit/Bed#: [address] [place] [address] Encounter: [id number]        Assessment/Plan:  Enteritis:   Presents with diarrhea + abdominal pain + s/p syncopal event (also has history of orthostatic hypotension on midodrine)  CT with inflammatory changes involving the terminal ileum  Afebrile  No leukocytosis  Inflammatory markers CRP, ESR are elevated  -Stool studies were actually collected last night and results are pending  Patient would benefit from endoscopic intervention/colonoscopy for further evaluation   - Continue antiemetics  - Continue IV antibiotics for gram negative and anerobic coverage until results from enteric panel  -Diet was advanced but she is stating that she still not feeling well  -We will plan for colonoscopy for tomorrow    Subjective:   Patient is lying in bed  Complaining of muscle spasm now in her back and she reports she had some diarrhea last night but now stool is becoming more formed and she has been trying to eat some food  She states at first that she wanted to go home but then she stated that she wanted to get the colonoscopy while she was here because she still not feeling well      Objective:     Vitals: /85   Pulse 84   Temp 97 7 °F (36 5 °C) (Oral)   Resp 18   LMP  (LMP Unknown)   SpO2 96%       Physical Exam:  Gen-alert no acute distress  Abd-positive bowel sounds nondistended some tenderness to palpation of the epigastric area no rebound rigidity guarding       Lab, Imaging and other studies:   Recent Results (from the past 72 hour(s))   ECG 12 lead    Collection Time: 05/01/23  1:26 PM   Result Value Ref Range    Ventricular Rate 109 BPM    Atrial Rate 109 BPM    MA Interval 152 ms    QRSD Interval 76 ms    QT Interval 328 ms    QTC Interval 441 ms    P Axis 60 degrees    QRS Axis -22 degrees    T Wave Axis 37 degrees   CBC and differential    Collection Time: 05/01/23  1:44 PM   Result Value Ref Range    WBC 4 16 (L) "4 31 - 10 16 Thousand/uL    RBC 5 02 3 81 - 5 12 Million/uL    Hemoglobin 14 3 11 5 - 15 4 g/dL    Hematocrit 42 5 34 8 - 46 1 %    MCV 85 82 - 98 fL    MCH 28 5 26 8 - 34 3 pg    MCHC 33 6 31 4 - 37 4 g/dL    RDW 14 3 11 6 - 15 1 %    MPV 10 4 8 9 - 12 7 fL    Platelets 342 790 - 733 Thousands/uL    nRBC 0 /100 WBCs    Neutrophils Relative 60 43 - 75 %    Immat GRANS % 0 0 - 2 %    Lymphocytes Relative 23 14 - 44 %    Monocytes Relative 15 (H) 4 - 12 %    Eosinophils Relative 1 0 - 6 %    Basophils Relative 1 0 - 1 %    Neutrophils Absolute 2 49 1 85 - 7 62 Thousands/µL    Immature Grans Absolute 0 00 0 00 - 0 20 Thousand/uL    Lymphocytes Absolute 0 96 0 60 - 4 47 Thousands/µL    Monocytes Absolute 0 63 0 17 - 1 22 Thousand/µL    Eosinophils Absolute 0 06 0 00 - 0 61 Thousand/µL    Basophils Absolute 0 02 0 00 - 0 10 Thousands/µL   Comprehensive metabolic panel    Collection Time: 05/01/23  1:44 PM   Result Value Ref Range    Sodium 136 135 - 147 mmol/L    Potassium 4 6 3 5 - 5 3 mmol/L    Chloride 103 96 - 108 mmol/L    CO2 21 21 - 32 mmol/L    ANION GAP 12 4 - 13 mmol/L    BUN 15 5 - 25 mg/dL    Creatinine 0 82 0 60 - 1 30 mg/dL    Glucose 115 65 - 140 mg/dL    Calcium 9 3 8 4 - 10 2 mg/dL    AST 37 13 - 39 U/L    ALT 30 7 - 52 U/L    Alkaline Phosphatase 74 34 - 104 U/L    Total Protein 7 3 6 4 - 8 4 g/dL    Albumin 3 9 3 5 - 5 0 g/dL    Total Bilirubin 0 68 0 20 - 1 00 mg/dL    eGFR 74 ml/min/1 73sq m   Magnesium    Collection Time: 05/01/23  1:44 PM   Result Value Ref Range    Magnesium 1 8 (L) 1 9 - 2 7 mg/dL   Lipase    Collection Time: 05/01/23  1:44 PM   Result Value Ref Range    Lipase 12 11 - 82 u/L   Lactic acid, plasma (w/reflex if result > 2 0)    Collection Time: 05/01/23  1:44 PM   Result Value Ref Range    LACTIC ACID 1 6 0 5 - 2 0 mmol/L   HS Troponin 0hr (reflex protocol)    Collection Time: 05/01/23  1:44 PM   Result Value Ref Range    hs TnI 0hr 4 \"Refer to ACS Flowchart\"- see link ng/L " "  Blood culture #1    Collection Time: 05/01/23  1:44 PM    Specimen: Arm, Left; Blood   Result Value Ref Range    Blood Culture No Growth at 48 hrs  Sedimentation rate, automated    Collection Time: 05/01/23  1:44 PM   Result Value Ref Range    Sed Rate 75 (H) 0 - 29 mm/hour   C-reactive protein    Collection Time: 05/01/23  1:44 PM   Result Value Ref Range    CRP 97 4 (H) <3 0 mg/L   Blood culture #2    Collection Time: 05/01/23  2:07 PM    Specimen: Arm, Right; Blood   Result Value Ref Range    Blood Culture No Growth at 48 hrs      COVID only    Collection Time: 05/01/23  2:18 PM    Specimen: Nose; Nares   Result Value Ref Range    SARS-CoV-2 Negative Negative   HS Troponin I 2hr    Collection Time: 05/01/23  4:30 PM   Result Value Ref Range    hs TnI 2hr 4 \"Refer to ACS Flowchart\"- see link ng/L    Delta 2hr hsTnI 0 <20 ng/L   UA w Reflex to Microscopic w Reflex to Culture    Collection Time: 05/01/23  7:16 PM    Specimen: Urine, Clean Catch   Result Value Ref Range    Color, UA Light Yellow     Clarity, UA Clear     Specific [place], UA 1 010 1 000 - 1 030    pH, UA 5 5 5 0, 5 5, 6 0, 6 5, 7 0, 7 5, 8 0, 8 5, 9 0    Leukocytes, UA Negative Negative    Nitrite, UA Negative Negative    Protein, UA Negative Negative mg/dl    Glucose, UA Negative Negative mg/dl    Ketones, UA 40 (2+) (A) Negative mg/dl    Urobilinogen, UA 0 2 0 2, 1 0 E U /dl E U /dl    Bilirubin, UA Negative Negative    Occult Blood, UA Trace-lysed (A) Negative   Urine Microscopic    Collection Time: 05/01/23  7:16 PM   Result Value Ref Range    RBC, UA 0-1 None Seen, 0-1, 1-2, 2-4, 0-5 /hpf    WBC, UA 0-1 None Seen, 0-1, 1-2, 0-5, 2-4 /hpf    Epithelial Cells Occasional None Seen, Occasional /hpf    Bacteria, UA None Seen None Seen, Occasional /hpf   Rapid drug screen, urine    Collection Time: 05/01/23  7:16 PM   Result Value Ref Range    Amph/Meth UR Negative Negative    Barbiturate Ur Negative Negative    Benzodiazepine Urine Negative " Negative    Cocaine Urine Negative Negative    Methadone Urine Negative Negative    Opiate Urine Positive (A) Negative    PCP Ur Negative Negative    THC Urine Negative Negative    Oxycodone Urine Negative Negative   TSH, 3rd generation    Collection Time: 05/02/23  6:22 AM   Result Value Ref Range    TSH 3RD GENERATON 0 899 0 450 - 4 500 uIU/mL   APTT    Collection Time: 05/02/23  6:22 AM   Result Value Ref Range    PTT 28 23 - 37 seconds   Protime-INR    Collection Time: 05/02/23  6:22 AM   Result Value Ref Range    Protime 14 2 11 6 - 14 5 seconds    INR 1 09 0 84 - 1 19   Lipase    Collection Time: 05/02/23  6:22 AM   Result Value Ref Range    Lipase 6 (L) 11 - 82 u/L   Phosphorus    Collection Time: 05/02/23  6:22 AM   Result Value Ref Range    Phosphorus 4 1 2 3 - 4 1 mg/dL   Magnesium    Collection Time: 05/02/23  6:22 AM   Result Value Ref Range    Magnesium 2 1 1 9 - 2 7 mg/dL   Comprehensive metabolic panel    Collection Time: 05/02/23  6:22 AM   Result Value Ref Range    Sodium 138 135 - 147 mmol/L    Potassium 4 0 3 5 - 5 3 mmol/L    Chloride 107 96 - 108 mmol/L    CO2 22 21 - 32 mmol/L    ANION GAP 9 4 - 13 mmol/L    BUN 15 5 - 25 mg/dL    Creatinine 0 59 (L) 0 60 - 1 30 mg/dL    Glucose 122 65 - 140 mg/dL    Calcium 8 1 (L) 8 4 - 10 2 mg/dL    Corrected Calcium 8 7 8 3 - 10 1 mg/dL    AST 17 13 - 39 U/L    ALT 22 7 - 52 U/L    Alkaline Phosphatase 67 34 - 104 U/L    Total Protein 6 0 (L) 6 4 - 8 4 g/dL    Albumin 3 3 (L) 3 5 - 5 0 g/dL    Total Bilirubin 0 35 0 20 - 1 00 mg/dL    eGFR 95 ml/min/1 73sq m   CBC and differential    Collection Time: 05/02/23  6:22 AM   Result Value Ref Range    WBC 3 32 (L) 4 31 - 10 16 Thousand/uL    RBC 4 31 3 81 - 5 12 Million/uL    Hemoglobin 12 2 11 5 - 15 4 g/dL    Hematocrit 36 5 34 8 - 46 1 %    MCV 85 82 - 98 fL    MCH 28 3 26 8 - 34 3 pg    MCHC 33 4 31 4 - 37 4 g/dL    RDW 13 6 11 6 - 15 1 %    MPV 9 9 8 9 - 12 7 fL    Platelets 865 876 - 838 Thousands/uL nRBC 0 /100 WBCs    Neutrophils Relative 82 (H) 43 - 75 %    Immat GRANS % 0 0 - 2 %    Lymphocytes Relative 14 14 - 44 %    Monocytes Relative 4 4 - 12 %    Eosinophils Relative 0 0 - 6 %    Basophils Relative 0 0 - 1 %    Neutrophils Absolute 2 73 1 85 - 7 62 Thousands/µL    Immature Grans Absolute 0 00 0 00 - 0 20 Thousand/uL    Lymphocytes Absolute 0 46 (L) 0 60 - 4 47 Thousands/µL    Monocytes Absolute 0 12 (L) 0 17 - 1 22 Thousand/µL    Eosinophils Absolute 0 00 0 00 - 0 61 Thousand/µL    Basophils Absolute 0 01 0 00 - 0 10 Thousands/µL   Procalcitonin    Collection Time: 05/02/23  6:22 AM   Result Value Ref Range    Procalcitonin 0 11 <=0 25 ng/ml   Ethanol    Collection Time: 05/02/23  2:02 PM   Result Value Ref Range    Ethanol Lvl <10 <10 mg/dL   Basic metabolic panel    Collection Time: 05/03/23 10:45 AM   Result Value Ref Range    Sodium 141 135 - 147 mmol/L    Potassium 3 4 (L) 3 5 - 5 3 mmol/L    Chloride 110 (H) 96 - 108 mmol/L    CO2 27 21 - 32 mmol/L    ANION GAP 4 4 - 13 mmol/L    BUN 12 5 - 25 mg/dL    Creatinine 0 65 0 60 - 1 30 mg/dL    Glucose 109 65 - 140 mg/dL    Calcium 7 6 (L) 8 4 - 10 2 mg/dL    eGFR 92 ml/min/1 73sq m   CBC    Collection Time: 05/03/23 10:45 AM   Result Value Ref Range    WBC 4 14 (L) 4 31 - 10 16 Thousand/uL    RBC 3 75 (L) 3 81 - 5 12 Million/uL    Hemoglobin 10 7 (L) 11 5 - 15 4 g/dL    Hematocrit 32 4 (L) 34 8 - 46 1 %    MCV 86 82 - 98 fL    MCH 28 5 26 8 - 34 3 pg    MCHC 33 0 31 4 - 37 4 g/dL    RDW 14 1 11 6 - 15 1 %    Platelets 934 081 - 995 Thousands/uL    MPV 9 7 8 9 - 12 7 fL   Basic metabolic panel    Collection Time: 05/04/23  4:56 AM   Result Value Ref Range    Sodium 143 135 - 147 mmol/L    Potassium 3 8 3 5 - 5 3 mmol/L    Chloride 112 (H) 96 - 108 mmol/L    CO2 25 21 - 32 mmol/L    ANION GAP 6 4 - 13 mmol/L    BUN 7 5 - 25 mg/dL    Creatinine 0 61 0 60 - 1 30 mg/dL    Glucose 96 65 - 140 mg/dL    Calcium 8 1 (L) 8 4 - 10 2 mg/dL    eGFR 94 ml/min/1 73sq m   CBC    Collection Time: 05/04/23  4:56 AM   Result Value Ref Range    WBC 4 33 4 31 - 10 16 Thousand/uL    RBC 3 81 3 81 - 5 12 Million/uL    Hemoglobin 10 7 (L) 11 5 - 15 4 g/dL    Hematocrit 32 9 (L) 34 8 - 46 1 %    MCV 86 82 - 98 fL    MCH 28 1 26 8 - 34 3 pg    MCHC 32 5 31 4 - 37 4 g/dL    RDW 13 9 11 6 - 15 1 %    Platelets 200 330 - 246 Thousands/uL    MPV 9 6 8 9 - 12 7 fL

## 2023-05-04 NOTE — PROGRESS NOTES
[facility]  Progress Note  Name: Linda Barajas  MRN: [id number]  Unit/Bed#: [address] 416[address]01 I Date of Admission: 5/1/2023   Date of Service: 5/4/2023 I Hospital Day: 3    Assessment/Plan   * Diarrhea  Assessment & Plan  Pt has hx of inflammatory bowel disease and saw GI once 2 weeks ago   - she was supposed to follow up this week but was rescheduled by physician office   - on CT abdomen/pelvis noted to have mild mucosal enhancement and submucosal wall thickening most notably involving the terminal ileum - infectious enteritis vs mild changes of active IB  - d/w GI and discontinued IV Rocephin and flagyl 5/3 as no indication for antibiotics and can cause recurrence of diarrhea or worsen it  -Reported diarrhea yesterday but none since  Today had small BM which was not diarrhea  - clear liquid diet --> soft/lite meal --> clears again continue IVF  - Per GI plan for colonoscopy tomorrow for further evaluation  - Repeat labs in a m  Syncope  Assessment & Plan  Most likely due to dehydration plus underlying known hx of orthostatic hypotension   - clinical hx does not indicate any additional etiology or cx of concern  - CT head and CT cervical spine negative for acute pathology  Orthostatic hypotension  Assessment & Plan  On midodrine at home   - dehydration secondary to diarrhea most likely exacerbated it and resulted in LOC  - Orthostatic vitals in ER (+)  - continue midodrine and IVF     Dehydration-resolved as of 5/3/2023  Assessment & Plan  - secondary to multiple episodes of diarrhea causing dehydration   - continue IVF  VTE Pharmacologic Prophylaxis: VTE Score: 3 Moderate Risk (Score 3-4) - Pharmacological DVT Prophylaxis Ordered: heparin  Patient Centered Rounds: I performed bedside rounds with nursing staff today    Discussions with Specialists or Other Care Team Provider: yes - GI    Education and Discussions with Family / Patient: Patient declined call to contact person  Total Time Spent on Date of Encounter in care of patient: 35 minutes This time was spent on one or more of the following: performing physical exam; counseling and coordination of care; obtaining or reviewing history; documenting in the medical record; reviewing/ordering tests, medications or procedures; communicating with other healthcare professionals and discussing with patient's family/caregivers  Current Length of Stay: 3 day(s)  Current Patient Status: Inpatient   Certification Statement: The patient will continue to require additional inpatient hospital stay due to Diarrhea/abdominal pain requiring colonoscopy  Discharge Plan: Anticipate discharge in 24-48 hrs to home  Code Status: Level 3 - DNAR and DNI    Subjective:     Earlier this morning, patient reported feeling better and wanted to go home  Stated she has not required any pain medication since yesterday  Had some diarrhea yesterday which has since resolved and today BM was formed  Later in the day reported muscle spasms in her back to GI and wanted to get the colonoscopy while here    Objective:     Vitals:   Temp (24hrs), Av 7 °F (36 5 °C), Min:97 5 °F (36 4 °C), Max:97 8 °F (36 6 °C)    Temp:  [97 5 °F (36 4 °C)-97 8 °F (36 6 °C)] 97 7 °F (36 5 °C)  HR:  [72-85] 84  Resp:  [18-19] 18  BP: (121-145)/(64-89) 142/85  SpO2:  [94 %-96 %] 96 %  There is no height or weight on file to calculate BMI  Input and Output Summary (last 24 hours): Intake/Output Summary (Last 24 hours) at 2023 0940  Last data filed at 2023 0825  Gross per 24 hour   Intake 1681 25 ml   Output 150 ml   Net 1531 25 ml       Physical Exam:   Physical Exam  Constitutional:       General: She is not in acute distress  Appearance: She is well-developed  HENT:      Head: Normocephalic and atraumatic  Eyes:      General: No scleral icterus  Cardiovascular:      Rate and Rhythm: Normal rate and regular rhythm     Pulmonary:      Effort: Pulmonary effort is normal  No respiratory distress  Breath sounds: Normal breath sounds  No wheezing or rales  Abdominal:      General: Bowel sounds are normal  There is no distension  Palpations: Abdomen is soft  Tenderness: There is no abdominal tenderness  Neurological:      Mental Status: She is alert and oriented to person, place, and time  Additional Data:     Labs:  Results from last 7 days   Lab Units 05/04/23 0456 05/03/23  1045 05/02/23  0622   WBC Thousand/uL 4 33   < > 3 32*   HEMOGLOBIN g/dL 10 7*   < > 12 2   HEMATOCRIT % 32 9*   < > 36 5   PLATELETS Thousands/uL 211   < > 268   NEUTROS PCT %  --   --  82*   LYMPHS PCT %  --   --  14   MONOS PCT %  --   --  4   EOS PCT %  --   --  0    < > = values in this interval not displayed  Results from last 7 days   Lab Units 05/04/23 0456 05/03/23  1045 05/02/23  0622   SODIUM mmol/L 143   < > 138   POTASSIUM mmol/L 3 8   < > 4 0   CHLORIDE mmol/L 112*   < > 107   CO2 mmol/L 25   < > 22   BUN mg/dL 7   < > 15   CREATININE mg/dL 0 61   < > 0 59*   ANION GAP mmol/L 6   < > 9   CALCIUM mg/dL 8 1*   < > 8 1*   ALBUMIN g/dL  --   --  3 3*   TOTAL BILIRUBIN mg/dL  --   --  0 35   ALK PHOS U/L  --   --  67   ALT U/L  --   --  22   AST U/L  --   --  17   GLUCOSE RANDOM mg/dL 96   < > 122    < > = values in this interval not displayed  Results from last 7 days   Lab Units 05/02/23  0622   INR  1 09             Results from last 7 days   Lab Units 05/02/23  0622 05/01/23  1344   LACTIC ACID mmol/L  --  1 6   PROCALCITONIN ng/ml 0 11  --        Lines/Drains:  Invasive Devices     Peripheral Intravenous Line  Duration           Peripheral IV 05/01/23 Right;Ventral (anterior) Wrist 2 days    Peripheral IV 05/03/23 Right Antecubital <1 day                      Imaging: No pertinent imaging reviewed      Recent Cultures (last 7 days):   Results from last 7 days   Lab Units 05/01/23  1407 05/01/23  1344   BLOOD CULTURE  No Growth at 48 hrs  No Growth at 48 hrs  Last 24 Hours Medication List:   Current Facility-Administered Medications   Medication Dose Route Frequency Provider Last Rate   • acetaminophen  650 mg Oral Q6H PRN Lamar Nugent MD     • albuterol  2 puff Inhalation Q6H PRN Lamar Nugent MD     • aluminum-magnesium hydroxide-simethicone  30 mL Oral Q6H PRN Lamar Nugent MD     • aspirin  81 mg Oral Daily Lamar Nugent MD     • atorvastatin  20 mg Oral Daily Lamar Nugent MD     • DULoxetine  60 mg Oral Daily Lamar Nugent MD     • fluticasone  1 puff Inhalation Q12H Jessica Dsouza MD     • gabapentin  300 mg Oral TID Lamar Nugent MD     • heparin (porcine)  5,000 Units Subcutaneous Cabrera Foster MD     • levothyroxine  50 mcg Oral Early Morning Lamar Nugent MD     • magnesium hydroxide  30 mL Oral Daily PRN Lamar Nugent MD     • midodrine  10 mg Oral BID before breakfast/lunch Lamar Nugent MD     • morphine injection  2 mg Intravenous Q6H PRN Lamar Nugent MD     • morphine injection  4 mg Intravenous Q6H PRN Lamar Nugent MD     • ondansetron  4 mg Intravenous Q6H PRN Lamar Nugent MD     • pantoprazole  40 mg Oral Early Morning Lamar Nugent MD     • pneumococcal 20-roderick conj vacc  0 5 mL Intramuscular Once Lamar Nugent MD     • QUEtiapine  100 mg Oral HS Lamar Nugent MD     • sodium chloride  75 mL/hr Intravenous Continuous Faith Revankar, DO 75 mL/hr (05/04/23 3998)   • sucralfate  1 g Oral 4x Daily (AC & HS) Lamar Nugent MD          Today, Patient Was Seen By: Forest Roth DO    **Please Note: This note may have been constructed using a voice recognition system  **

## 2023-05-05 ENCOUNTER — ANESTHESIA EVENT (INPATIENT)
Dept: PERIOP | Facility: HOSPITAL | Age: 68
End: 2023-05-05

## 2023-05-05 ENCOUNTER — ANESTHESIA (INPATIENT)
Dept: PERIOP | Facility: HOSPITAL | Age: 68
End: 2023-05-05

## 2023-05-05 ENCOUNTER — APPOINTMENT (OUTPATIENT)
Dept: PERIOP | Facility: HOSPITAL | Age: 68
End: 2023-05-05

## 2023-05-05 PROBLEM — I10 HTN (HYPERTENSION): Status: ACTIVE | Noted: 2023-05-05

## 2023-05-05 PROBLEM — R55 SYNCOPE: Status: RESOLVED | Noted: 2020-03-02 | Resolved: 2023-05-05

## 2023-05-05 PROBLEM — E78.5 HYPERLIPIDEMIA: Status: ACTIVE | Noted: 2023-05-05

## 2023-05-05 LAB
ANION GAP SERPL CALCULATED.3IONS-SCNC: 7 MMOL/L (ref 4–13)
BUN SERPL-MCNC: 5 MG/DL (ref 5–25)
C DIFF TOX B TCDB STL QL NAA+PROBE: NEGATIVE
CALCIUM SERPL-MCNC: 8.2 MG/DL (ref 8.4–10.2)
CAMPYLOBACTER DNA SPEC NAA+PROBE: DETECTED
CHLORIDE SERPL-SCNC: 108 MMOL/L (ref 96–108)
CO2 SERPL-SCNC: 26 MMOL/L (ref 21–32)
CREAT SERPL-MCNC: 0.6 MG/DL (ref 0.6–1.3)
GFR SERPL CREATININE-BSD FRML MDRD: 95 ML/MIN/1.73SQ M
GLUCOSE SERPL-MCNC: 90 MG/DL (ref 65–140)
POTASSIUM SERPL-SCNC: 4.4 MMOL/L (ref 3.5–5.3)
SALMONELLA DNA SPEC QL NAA+PROBE: ABNORMAL
SHIGA TOXIN STX GENE SPEC NAA+PROBE: ABNORMAL
SHIGELLA DNA SPEC QL NAA+PROBE: ABNORMAL
SODIUM SERPL-SCNC: 141 MMOL/L (ref 135–147)

## 2023-05-05 PROCEDURE — 0DBB8ZX EXCISION OF ILEUM, VIA NATURAL OR ARTIFICIAL OPENING ENDOSCOPIC, DIAGNOSTIC: ICD-10-PCS | Performed by: INTERNAL MEDICINE

## 2023-05-05 PROCEDURE — 0DB68ZX EXCISION OF STOMACH, VIA NATURAL OR ARTIFICIAL OPENING ENDOSCOPIC, DIAGNOSTIC: ICD-10-PCS | Performed by: INTERNAL MEDICINE

## 2023-05-05 PROCEDURE — 0DB98ZX EXCISION OF DUODENUM, VIA NATURAL OR ARTIFICIAL OPENING ENDOSCOPIC, DIAGNOSTIC: ICD-10-PCS | Performed by: INTERNAL MEDICINE

## 2023-05-05 RX ORDER — METHYLPREDNISOLONE SODIUM SUCCINATE 125 MG/2ML
60 INJECTION, POWDER, LYOPHILIZED, FOR SOLUTION INTRAMUSCULAR; INTRAVENOUS ONCE
Status: COMPLETED | OUTPATIENT
Start: 2023-05-05 | End: 2023-05-05

## 2023-05-05 RX ORDER — PROPOFOL 10 MG/ML
INJECTION, EMULSION INTRAVENOUS AS NEEDED
Status: DISCONTINUED | OUTPATIENT
Start: 2023-05-05 | End: 2023-05-05

## 2023-05-05 RX ORDER — PROPOFOL 10 MG/ML
INJECTION, EMULSION INTRAVENOUS CONTINUOUS PRN
Status: DISCONTINUED | OUTPATIENT
Start: 2023-05-05 | End: 2023-05-05

## 2023-05-05 RX ORDER — SODIUM CHLORIDE, SODIUM LACTATE, POTASSIUM CHLORIDE, CALCIUM CHLORIDE 600; 310; 30; 20 MG/100ML; MG/100ML; MG/100ML; MG/100ML
INJECTION, SOLUTION INTRAVENOUS CONTINUOUS PRN
Status: DISCONTINUED | OUTPATIENT
Start: 2023-05-05 | End: 2023-05-05

## 2023-05-05 RX ORDER — METHYLPREDNISOLONE SODIUM SUCCINATE 125 MG/2ML
60 INJECTION, POWDER, LYOPHILIZED, FOR SOLUTION INTRAMUSCULAR; INTRAVENOUS DAILY
Status: DISCONTINUED | OUTPATIENT
Start: 2023-05-06 | End: 2023-05-05

## 2023-05-05 RX ORDER — LIDOCAINE HYDROCHLORIDE 10 MG/ML
INJECTION, SOLUTION EPIDURAL; INFILTRATION; INTRACAUDAL; PERINEURAL AS NEEDED
Status: DISCONTINUED | OUTPATIENT
Start: 2023-05-05 | End: 2023-05-05

## 2023-05-05 RX ORDER — PREDNISONE 20 MG/1
40 TABLET ORAL DAILY
Status: DISCONTINUED | OUTPATIENT
Start: 2023-05-06 | End: 2023-05-06 | Stop reason: HOSPADM

## 2023-05-05 RX ADMIN — ACETAMINOPHEN 975 MG: 325 TABLET, FILM COATED ORAL at 17:04

## 2023-05-05 RX ADMIN — HEPARIN SODIUM 5000 UNITS: 5000 INJECTION INTRAVENOUS; SUBCUTANEOUS at 17:05

## 2023-05-05 RX ADMIN — MIDODRINE HYDROCHLORIDE 10 MG: 5 TABLET ORAL at 11:13

## 2023-05-05 RX ADMIN — QUETIAPINE FUMARATE 100 MG: 100 TABLET ORAL at 21:08

## 2023-05-05 RX ADMIN — SUCRALFATE 1 G: 1 TABLET ORAL at 06:22

## 2023-05-05 RX ADMIN — GABAPENTIN 300 MG: 300 CAPSULE ORAL at 21:08

## 2023-05-05 RX ADMIN — ACETAMINOPHEN 975 MG: 325 TABLET, FILM COATED ORAL at 07:00

## 2023-05-05 RX ADMIN — PROPOFOL 20 MG: 10 INJECTION, EMULSION INTRAVENOUS at 14:33

## 2023-05-05 RX ADMIN — LEVOTHYROXINE SODIUM 50 MCG: 50 TABLET ORAL at 06:22

## 2023-05-05 RX ADMIN — ACETAMINOPHEN 975 MG: 325 TABLET, FILM COATED ORAL at 00:12

## 2023-05-05 RX ADMIN — PROPOFOL 120 MCG/KG/MIN: 10 INJECTION, EMULSION INTRAVENOUS at 14:11

## 2023-05-05 RX ADMIN — HEPARIN SODIUM 5000 UNITS: 5000 INJECTION INTRAVENOUS; SUBCUTANEOUS at 08:10

## 2023-05-05 RX ADMIN — METHYLPREDNISOLONE SODIUM SUCCINATE 60 MG: 125 INJECTION, POWDER, FOR SOLUTION INTRAMUSCULAR; INTRAVENOUS at 21:08

## 2023-05-05 RX ADMIN — SODIUM CHLORIDE 75 ML/HR: 0.9 INJECTION, SOLUTION INTRAVENOUS at 11:12

## 2023-05-05 RX ADMIN — HEPARIN SODIUM 5000 UNITS: 5000 INJECTION INTRAVENOUS; SUBCUTANEOUS at 00:10

## 2023-05-05 RX ADMIN — PROPOFOL 30 MG: 10 INJECTION, EMULSION INTRAVENOUS at 14:10

## 2023-05-05 RX ADMIN — PROPOFOL 50 MG: 10 INJECTION, EMULSION INTRAVENOUS at 14:14

## 2023-05-05 RX ADMIN — DULOXETINE HYDROCHLORIDE 60 MG: 60 CAPSULE, DELAYED RELEASE ORAL at 08:10

## 2023-05-05 RX ADMIN — SUCRALFATE 1 G: 1 TABLET ORAL at 17:05

## 2023-05-05 RX ADMIN — METHOCARBAMOL 500 MG: 500 TABLET ORAL at 00:10

## 2023-05-05 RX ADMIN — MIDODRINE HYDROCHLORIDE 10 MG: 5 TABLET ORAL at 06:23

## 2023-05-05 RX ADMIN — METHOCARBAMOL 500 MG: 500 TABLET ORAL at 17:05

## 2023-05-05 RX ADMIN — FLUTICASONE PROPIONATE 1 PUFF: 110 AEROSOL, METERED RESPIRATORY (INHALATION) at 08:11

## 2023-05-05 RX ADMIN — PROPOFOL 30 MG: 10 INJECTION, EMULSION INTRAVENOUS at 14:04

## 2023-05-05 RX ADMIN — SODIUM CHLORIDE, SODIUM LACTATE, POTASSIUM CHLORIDE, AND CALCIUM CHLORIDE: .6; .31; .03; .02 INJECTION, SOLUTION INTRAVENOUS at 13:53

## 2023-05-05 RX ADMIN — PROPOFOL 30 MG: 10 INJECTION, EMULSION INTRAVENOUS at 14:07

## 2023-05-05 RX ADMIN — METHOCARBAMOL 500 MG: 500 TABLET ORAL at 08:10

## 2023-05-05 RX ADMIN — LIDOCAINE HYDROCHLORIDE 50 MG: 10 INJECTION, SOLUTION EPIDURAL; INFILTRATION; INTRACAUDAL; PERINEURAL at 14:00

## 2023-05-05 RX ADMIN — ATORVASTATIN CALCIUM 20 MG: 20 TABLET, FILM COATED ORAL at 08:10

## 2023-05-05 RX ADMIN — GABAPENTIN 300 MG: 300 CAPSULE ORAL at 17:05

## 2023-05-05 RX ADMIN — PROPOFOL 50 MG: 10 INJECTION, EMULSION INTRAVENOUS at 14:01

## 2023-05-05 RX ADMIN — SUCRALFATE 1 G: 1 TABLET ORAL at 21:08

## 2023-05-05 RX ADMIN — PANTOPRAZOLE SODIUM 40 MG: 40 TABLET, DELAYED RELEASE ORAL at 06:22

## 2023-05-05 RX ADMIN — PROPOFOL 100 MG: 10 INJECTION, EMULSION INTRAVENOUS at 14:00

## 2023-05-05 RX ADMIN — ASPIRIN 81 MG: 81 TABLET, COATED ORAL at 08:10

## 2023-05-05 RX ADMIN — GABAPENTIN 300 MG: 300 CAPSULE ORAL at 08:10

## 2023-05-05 NOTE — ANESTHESIA PREPROCEDURE EVALUATION
Procedure:  EGD  COLONOSCOPY    Relevant Problems   ANESTHESIA (within normal limits)      CARDIO   (+) HTN (hypertension)   (+) Hyperlipidemia   (+) Migraine without aura and without status migrainosus, not intractable      ENDO   (+) Adult hypothyroidism      GI/HEPATIC   (+) Gastroesophageal reflux disease with esophagitis without hemorrhage      MUSCULOSKELETAL   (+) Cervical spondylosis without myelopathy   (+) Fibromyalgia   (+) 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HCC)   (+) Osteoarthritis of knee   (+) Polymyalgia (HCC)      NEURO/PSYCH   (+) Anxiety and depression   (+) Cervical myelopathy with cervical radiculopathy (HCC)   (+) Depression   (+) Fibromyalgia   (+) Generalized anxiety disorder   (+) History of melanoma   (+) History of migraine headaches   (+) History of vertigo   (+) Major depression, single episode   (+) Major depressive disorder, recurrent, in partial remission (HCC)   (+) Migraine without aura and without status migrainosus, not intractable   (+) PTSD (post-traumatic stress disorder)   (+) Recurrent major depressive disorder, in full remission ([facility] [address] )   (+) Right sided temporal headache   (+) Severe episode of recurrent major depressive disorder, without psychotic features (HCC)   (+) TIA (transient ischemic attack)      PULMONARY   (+) Mild asthma        Physical Exam    Airway    Mallampati score: II  TM Distance: >3 FB  Neck ROM: full     Dental   No notable dental hx     Cardiovascular  Rhythm: regular, Rate: normal,     Pulmonary  Breath sounds clear to auscultation,     Other Findings        Anesthesia Plan  ASA Score- 2     Anesthesia Type- IV sedation with anesthesia with ASA Monitors  Additional Monitors:   Airway Plan:           Plan Factors-Exercise tolerance (METS): >4 METS  Chart reviewed  EKG reviewed  Existing labs reviewed  Patient summary reviewed  Patient is not a current smoker           Induction-     Postoperative Plan-     Informed Consent- Anesthetic plan and risks discussed with patient  I personally reviewed this patient with the CRNA  Discussed and agreed on the Anesthesia Plan with the CRNA  Pj Ruano

## 2023-05-05 NOTE — ANESTHESIA POSTPROCEDURE EVALUATION
Post-Op Assessment Note    CV Status:  Stable  Pain Score: 0    Pain management: adequate     Mental Status:  Sleepy   Hydration Status:  Stable   PONV Controlled:  None      Post Op Vitals Reviewed: Yes      Staff: Anesthesiologist, CRNA         No notable events documented      /69 (05/05/23 1506)    Temp      Pulse 76 (05/05/23 1506)   Resp 18 (05/05/23 1506)    SpO2 99 % (05/05/23 1506)

## 2023-05-06 VITALS
DIASTOLIC BLOOD PRESSURE: 80 MMHG | HEART RATE: 83 BPM | OXYGEN SATURATION: 95 % | RESPIRATION RATE: 18 BRPM | TEMPERATURE: 97.6 F | SYSTOLIC BLOOD PRESSURE: 137 MMHG

## 2023-05-06 PROBLEM — K50.90 CROHN'S DISEASE (HCC): Status: ACTIVE | Noted: 2023-05-06

## 2023-05-06 LAB
BACTERIA BLD CULT: NORMAL
BACTERIA BLD CULT: NORMAL
HBV CORE IGM SER QL: NORMAL
HBV SURFACE AB SER-ACNC: <3 MIU/ML
HBV SURFACE AG SER QL: NORMAL

## 2023-05-06 RX ORDER — MIDODRINE HYDROCHLORIDE 10 MG/1
10 TABLET ORAL 2 TIMES DAILY
Start: 2023-05-06

## 2023-05-06 RX ORDER — PREDNISONE 20 MG/1
40 TABLET ORAL DAILY
Qty: 40 TABLET | Refills: 0 | Status: SHIPPED | OUTPATIENT
Start: 2023-05-07 | End: 2023-05-27

## 2023-05-06 RX ORDER — SENNOSIDES 8.6 MG
650 CAPSULE ORAL EVERY 8 HOURS PRN
Refills: 0
Start: 2023-05-06

## 2023-05-06 RX ORDER — SUCRALFATE 1 G/1
1 TABLET ORAL 4 TIMES DAILY
Qty: 120 TABLET | Refills: 0 | Status: SHIPPED | OUTPATIENT
Start: 2023-05-06

## 2023-05-06 RX ADMIN — METHOCARBAMOL 500 MG: 500 TABLET ORAL at 08:20

## 2023-05-06 RX ADMIN — PANTOPRAZOLE SODIUM 40 MG: 40 TABLET, DELAYED RELEASE ORAL at 06:15

## 2023-05-06 RX ADMIN — ACETAMINOPHEN 975 MG: 325 TABLET, FILM COATED ORAL at 00:39

## 2023-05-06 RX ADMIN — DULOXETINE HYDROCHLORIDE 60 MG: 60 CAPSULE, DELAYED RELEASE ORAL at 08:19

## 2023-05-06 RX ADMIN — SODIUM CHLORIDE 75 ML/HR: 0.9 INJECTION, SOLUTION INTRAVENOUS at 06:16

## 2023-05-06 RX ADMIN — FLUTICASONE PROPIONATE 1 PUFF: 110 AEROSOL, METERED RESPIRATORY (INHALATION) at 08:21

## 2023-05-06 RX ADMIN — METHOCARBAMOL 500 MG: 500 TABLET ORAL at 00:40

## 2023-05-06 RX ADMIN — SUCRALFATE 1 G: 1 TABLET ORAL at 06:17

## 2023-05-06 RX ADMIN — MIDODRINE HYDROCHLORIDE 10 MG: 5 TABLET ORAL at 11:04

## 2023-05-06 RX ADMIN — HEPARIN SODIUM 5000 UNITS: 5000 INJECTION INTRAVENOUS; SUBCUTANEOUS at 08:21

## 2023-05-06 RX ADMIN — ATORVASTATIN CALCIUM 20 MG: 20 TABLET, FILM COATED ORAL at 08:19

## 2023-05-06 RX ADMIN — ASPIRIN 81 MG: 81 TABLET, COATED ORAL at 08:19

## 2023-05-06 RX ADMIN — LEVOTHYROXINE SODIUM 50 MCG: 50 TABLET ORAL at 06:17

## 2023-05-06 RX ADMIN — HEPARIN SODIUM 5000 UNITS: 5000 INJECTION INTRAVENOUS; SUBCUTANEOUS at 00:40

## 2023-05-06 RX ADMIN — PREDNISONE 40 MG: 20 TABLET ORAL at 08:19

## 2023-05-06 RX ADMIN — ACETAMINOPHEN 975 MG: 325 TABLET, FILM COATED ORAL at 08:19

## 2023-05-06 RX ADMIN — SUCRALFATE 1 G: 1 TABLET ORAL at 11:04

## 2023-05-06 RX ADMIN — MIDODRINE HYDROCHLORIDE 10 MG: 5 TABLET ORAL at 06:18

## 2023-05-06 RX ADMIN — GABAPENTIN 300 MG: 300 CAPSULE ORAL at 08:19

## 2023-05-06 NOTE — PROGRESS NOTES
[facility] 46  Progress Note  Name: Sariah Montemayor  MRN: [id number]  Unit/Bed#: 4 [facility]-01 I Date of Admission: 5/1/2023   Date of Service: 5/5/2023 I Hospital Day: 4    Assessment/Plan   * Diarrhea  Assessment & Plan  Pt has hx of inflammatory bowel disease and saw GI once 2 weeks ago   - she was supposed to follow up this week but was rescheduled by physician office   - on CT abdomen/pelvis noted to have mild mucosal enhancement and submucosal wall thickening most notably involving the terminal ileum - infectious enteritis vs mild changes of active IBD  - d/w GI and discontinued IV Rocephin and flagyl 5/3 as no indication for antibiotics and can cause recurrence of diarrhea or worsen it  - clear liquid diet --> soft/lite meal --> low fiber/low residue  -Status post EGD which showed small hiatal hernia, slight antral erythema   -S/p colonoscopy which showed findings suggestive of Crohn's disease with possible nonbleeding visible vessel on the ileocecal valve with ulceration which was clipped  -1 dose of IV steroids today and transition to p o  prednisone tomorrow prior to discharge    Syncope-resolved as of 5/5/2023  Assessment & Plan  Most likely due to dehydration plus underlying known hx of orthostatic hypotension   - clinical hx does not indicate any additional etiology or cx of concern  - CT head and CT cervical spine negative for acute pathology  Orthostatic hypotension  Assessment & Plan  On midodrine at home   - dehydration secondary to diarrhea most likely exacerbated it and resulted in LOC  - Orthostatic vitals in ER (+)  - continue midodrine and IVF    Hyperlipidemia  Assessment & Plan  Continue Lipitor           VTE Pharmacologic Prophylaxis: VTE Score: 3 Moderate Risk (Score 3-4) - Pharmacological DVT Prophylaxis Ordered: heparin  Patient Centered Rounds: I performed bedside rounds with nursing staff today    Discussions with Specialists or Other Care Team Provider: yes - GI    Education and Discussions with Family / Patient: Patient declined call to   Total Time Spent on Date of Encounter in care of patient: 35 minutes This time was spent on one or more of the following: performing physical exam; counseling and coordination of care; obtaining or reviewing history; documenting in the medical record; reviewing/ordering tests, medications or procedures; communicating with other healthcare professionals and discussing with patient's family/caregivers  Current Length of Stay: 4 day(s)  Current Patient Status: Inpatient   Certification Statement: The patient will continue to require additional inpatient hospital stay due to That is post colonoscopy showing Crohn's disease requiring IV steroid  Discharge Plan: Anticipate discharge tomorrow to home  Code Status: Level 3 - DNAR and DNI    Subjective:     Still with some abdominal pain  Tolerating diet    Objective:     Vitals:   Temp (24hrs), Av 8 °F (36 6 °C), Min:97 6 °F (36 4 °C), Max:98 °F (36 7 °C)    Temp:  [97 6 °F (36 4 °C)-98 °F (36 7 °C)] 97 6 °F (36 4 °C)  HR:  [60-79] 64  Resp:  [18] 18  BP: (133-194)/(69-98) 159/87  SpO2:  [92 %-100 %] 94 %  There is no height or weight on file to calculate BMI  Input and Output Summary (last 24 hours): Intake/Output Summary (Last 24 hours) at 2023  Last data filed at 2023 1112  Gross per 24 hour   Intake  5 ml   Output --   Net  5 ml       Physical Exam:   Physical Exam  Constitutional:       General: She is not in acute distress  Appearance: She is not ill-appearing  HENT:      Head: Normocephalic and atraumatic  Eyes:      General: No scleral icterus  Cardiovascular:      Rate and Rhythm: Normal rate and regular rhythm  Pulmonary:      Effort: Pulmonary effort is normal  No respiratory distress  Breath sounds: Normal breath sounds  No wheezing or rales     Abdominal:      General: Bowel sounds are normal  There is no distension  Palpations: Abdomen is soft  Tenderness: There is abdominal tenderness (right sided)  There is no guarding or rebound  Neurological:      Mental Status: She is alert and oriented to person, place, and time  Additional Data:     Labs:  Results from last 7 days   Lab Units 05/04/23  0456 05/03/23  1045 05/02/23  0622   WBC Thousand/uL 4 33   < > 3 32*   HEMOGLOBIN g/dL 10 7*   < > 12 2   HEMATOCRIT % 32 9*   < > 36 5   PLATELETS Thousands/uL 211   < > 268   NEUTROS PCT %  --   --  82*   LYMPHS PCT %  --   --  14   MONOS PCT %  --   --  4   EOS PCT %  --   --  0    < > = values in this interval not displayed  Results from last 7 days   Lab Units 05/05/23  0416 05/03/23  1045 05/02/23  0622   SODIUM mmol/L 141   < > 138   POTASSIUM mmol/L 4 4   < > 4 0   CHLORIDE mmol/L 108   < > 107   CO2 mmol/L 26   < > 22   BUN mg/dL 5   < > 15   CREATININE mg/dL 0 60   < > 0 59*   ANION GAP mmol/L 7   < > 9   CALCIUM mg/dL 8 2*   < > 8 1*   ALBUMIN g/dL  --   --  3 3*   TOTAL BILIRUBIN mg/dL  --   --  0 35   ALK PHOS U/L  --   --  67   ALT U/L  --   --  22   AST U/L  --   --  17   GLUCOSE RANDOM mg/dL 90   < > 122    < > = values in this interval not displayed  Results from last 7 days   Lab Units 05/02/23  0622   INR  1 09             Results from last 7 days   Lab Units 05/02/23  0622 05/01/23  1344   LACTIC ACID mmol/L  --  1 6   PROCALCITONIN ng/ml 0 11  --        Lines/Drains:  Invasive Devices     Peripheral Intravenous Line  Duration           Peripheral IV 05/03/23 Right Antecubital 2 days    Peripheral IV 05/04/23 Left;Upper;Ventral (anterior) Arm <1 day                      Imaging: Reviewed radiology reports from this admission including: procedure reports    Recent Cultures (last 7 days):   Results from last 7 days   Lab Units 05/03/23  2140 05/01/23  1407 05/01/23  1344   BLOOD CULTURE   --  No Growth After 4 Days  No Growth After 4 Days     C DIFF TOXIN B BY PCR  Negative  -- --        Last 24 Hours Medication List:   Current Facility-Administered Medications   Medication Dose Route Frequency Provider Last Rate   • acetaminophen  975 mg Oral [facility] Jim Dockery MD     • albuterol  2 puff Inhalation Q6H PRN Jim Dockery MD     • aluminum-magnesium hydroxide-simethicone  30 mL Oral Q6H PRN Jim Dockery MD     • aspirin  81 mg Oral Daily Jim Dockery MD     • atorvastatin  20 mg Oral Daily Jim Dockery MD     • DULoxetine  60 mg Oral Daily Jim Dockery MD     • fluticasone  1 puff Inhalation Q12H [facility] Jim Dockery MD     • gabapentin  300 mg Oral TID Jim Dockery MD     • heparin (porcine)  5,000 Units Subcutaneous Q8H Jim Dockery MD     • levothyroxine  50 mcg Oral Early Morning Jim Dockery MD     • magnesium hydroxide  30 mL Oral Daily PRN Jim Dockery MD     • methocarbamol  500 mg Oral [facility] Jim Dockery MD     • [START ON 5/6/2023] methylPREDNISolone sodium succinate  60 mg Intravenous Daily Jim Dockery MD     • midodrine  10 mg Oral BID before breakfast/lunch Jim Dockery MD     • morphine injection  2 mg Intravenous Q6H PRN Jim Dockery MD     • morphine injection  4 mg Intravenous Q6H PRN Jim Dockery MD     • ondansetron  4 mg Intravenous Q6H PRN Jim Dockery MD     • pantoprazole  40 mg Oral Early Morning Jim Dockery MD     • pneumococcal 20-roderick conj vacc  0 5 mL Intramuscular Once Jim Dockery MD     • [START ON 5/6/2023] predniSONE  40 mg Oral Daily Jim Dockery MD     • QUEtiapine  100 mg Oral HS Jim Dockery MD     • sodium chloride  75 mL/hr Intravenous Continuous Jim Dockery MD 75 mL/hr (05/05/23 1112)   • sucralfate  1 g Oral 4x Daily (AC & HS) Jim Dockery MD          Today, Patient Was Seen By: Donis Rossi DO    **Please Note: This note may have been constructed using a voice recognition system  **

## 2023-05-06 NOTE — DISCHARGE SUMMARY
[address]  Discharge- Augustina Colon 1955, 78 y o  female MRN: [id number]  Unit/Bed#: [address] [place] [address] Encounter: [id number]  Primary Care Provider: Destini Ribera MD   Date and time admitted to hospital: 5/1/2023  1:20 PM    * Diarrhea  Assessment & Plan  Pt has hx of inflammatory bowel disease and saw GI once 2 weeks ago   - she was supposed to follow up this week but was rescheduled by physician office   - on CT abdomen/pelvis noted to have mild mucosal enhancement and submucosal wall thickening most notably involving the terminal ileum - infectious enteritis vs mild changes of active IBD  - Initially started on IV Rocephin and flagyl which was d/c'ed on 5/3 as no indication for antibiotics  - clear liquid diet --> soft/lite meal --> tolerating low residue  - C diff neg, bacterial enteric panel (+) campylobacter - findings d/w pt  Currently no diarrhea and no indication for tx    Crohn's disease [facility])  Assessment & Plan  Patient reported abd pain along with her diarrhea  -Status post EGD which showed small hiatal hernia, slight antral erythema   -S/p colonoscopy which showed findings suggestive of Crohn's disease with possible nonbleeding visible vessel on the ileocecal valve with ulceration which was clipped  -1 dose of IV solumedrol given and transitioned to p o  prednisone 40 mg daily for 21 days on d/c  F/U with GI after d/c    Syncope-resolved as of 5/5/2023  Assessment & Plan  Most likely due to dehydration plus underlying known hx of orthostatic hypotension   - clinical hx does not indicate any additional etiology or cx of concern  - CT head and CT cervical spine negative for acute pathology      Orthostatic hypotension  Assessment & Plan  On midodrine at home, continue on d/c  - dehydration secondary to diarrhea most likely exacerbated it and resulted in LOC  - Orthostatic vitals in ER (+)  - received IVF here    Hyperlipidemia  Assessment & Plan  Continue Lipitor  Dehydration-resolved as of 5/3/2023  Assessment & Plan  - secondary to multiple episodes of diarrhea causing dehydration   - continue IVF  Medical Problems     Resolved Problems  Date Reviewed: 5/6/2023          Resolved    Syncope 5/5/2023     Resolved by  Dana Bishop DO    Dehydration 5/3/2023     Resolved by  Dana Bishop DO        Discharging Physician / Practitioner: Dana Bishop DO  PCP: Yessi Cordova MD  Admission Date:   Admission Orders (From admission, onward)     Ordered        05/01/23 1724  INPATIENT ADMISSION  Once                      Discharge Date: 05/06/23    Consultations During Hospital Stay:  · GI    Procedures Performed:   · EGD  · Colonoscopy    Significant Findings / Test Results:   · See above    Incidental Findings:   · none    Test Results Pending at Discharge (will require follow up):   · none     Outpatient Tests Requested:  · none    Complications:  None    Reason for Admission: Diarrhea, abdominal pain    Hospital Course:   Jac Lyman is a 78 y o  female patient who originally presented to the hospital on 5/1/2023 due to diarrhea, weakness X 3 days prior to admission  She also reported abd pain  Reported she felt weak and had a syncopal episode in the kitchen  Patient was admitted and seen by GI  Diet slowly advanced  Underwent work up per GI and cleared by GI prior to d/c  Patient symptoms improved  D/C plan discussed in details with pt      Please see above list of diagnoses and related plan for additional information  Condition at Discharge: stable    Discharge Day Visit / Exam:   Subjective:  Feels much better, no further diarrhea   Wants to leave as soon as possible    Vitals: Blood Pressure: 137/80 (05/06/23 0717)  Pulse: 83 (05/06/23 0717)  Temperature: 97 6 °F (36 4 °C) (05/06/23 0717)  Temp Source: Temporal (05/05/23 1328)  Respirations: 18 (05/06/23 0717)  SpO2: 95 % (05/06/23 0717)  Exam:   Physical Exam  Constitutional:       General: She is not in acute distress  Appearance: She is well-developed  HENT:      Head: Normocephalic and atraumatic  Eyes:      General: No scleral icterus  Cardiovascular:      Rate and Rhythm: Normal rate and regular rhythm  Pulmonary:      Effort: Pulmonary effort is normal  No respiratory distress  Breath sounds: Normal breath sounds  No wheezing or rales  Abdominal:      General: Bowel sounds are normal  There is no distension  Palpations: Abdomen is soft  Tenderness: There is no abdominal tenderness  Neurological:      Mental Status: She is alert and oriented to person, place, and time  Discussion with Family: Patient declined call to   Discharge instructions/Information to patient and family:   See after visit summary for information provided to patient and family  Provisions for Follow-Up Care:  See after visit summary for information related to follow-up care and any pertinent home health orders  Disposition:   Home    Planned Readmission: no     Discharge Statement:  I spent > 30 minutes discharging the patient  This time was spent on the day of discharge  I had direct contact with the patient on the day of discharge  Greater than 50% of the total time was spent examining patient, answering all patient questions, arranging and discussing plan of care with patient as well as directly providing post-discharge instructions  Additional time then spent on discharge activities  Discharge Medications:  See after visit summary for reconciled discharge medications provided to patient and/or family        **Please Note: This note may have been constructed using a voice recognition system**

## 2023-05-06 NOTE — PLAN OF CARE
Problem: Potential for Falls  Goal: Patient will remain free of falls  Description: INTERVENTIONS:  - Educate patient/family on patient safety including physical limitations  - Instruct patient to call for assistance with activity   - Consult OT/PT to assist with strengthening/mobility   - Keep Call bell within reach  - Keep bed low and locked with side rails adjusted as appropriate  - Keep care items and personal belongings within reach  - Initiate and maintain comfort rounds  - Make Fall Risk Sign visible to staff  - Offer Toileting every 2 Hours, in advance of need  - Initiate/Maintain bed alarm  - Obtain necessary fall risk management equipment: bed/chair alarm  - Apply yellow socks and bracelet for high fall risk patients  - Consider moving patient to room near nurses station  5/6/2023 1235 by Jessica Hayes RN  Outcome: Adequate for Discharge  5/6/2023 1234 by Jessica Hayes RN  Outcome: Progressing     Problem: Knowledge Deficit  Goal: Patient/family/caregiver demonstrates understanding of disease process, treatment plan, medications, and discharge instructions  Description: Complete learning assessment and assess knowledge base    Interventions:  - Provide teaching at level of understanding  - Provide teaching via preferred learning methods  5/6/2023 1235 by Jessica Hayes RN  Outcome: Adequate for Discharge  5/6/2023 1234 by Jessica Hayes RN  Outcome: Progressing     Problem: PAIN - ADULT  Goal: Verbalizes/displays adequate comfort level or baseline comfort level  Description: Interventions:  - Encourage patient to monitor pain and request assistance  - Assess pain using appropriate pain scale  - Administer analgesics based on type and severity of pain and evaluate response  - Implement non-pharmacological measures as appropriate and evaluate response  - Consider cultural and social influences on pain and pain management  - Notify physician/advanced practitioner if interventions unsuccessful or patient reports new pain  5/6/2023 1235 by Amanda Harris RN  Outcome: Adequate for Discharge  5/6/2023 1234 by Amanda Harris RN  Outcome: Progressing     Problem: INFECTION - ADULT  Goal: Absence of fever/infection during neutropenic period  Description: INTERVENTIONS:  - Monitor WBC    5/6/2023 1235 by Amanda Harris RN  Outcome: Adequate for Discharge  5/6/2023 1234 by Amanda Harris RN  Outcome: Progressing     Problem: MOBILITY - ADULT  Goal: Maintain or return to baseline ADL function  Description: INTERVENTIONS:  -  Assess patient's ability to carry out ADLs; assess patient's baseline for ADL function and identify physical deficits which impact ability to perform ADLs (bathing, care of mouth/teeth, toileting, grooming, dressing, etc )  - Assess/evaluate cause of self-care deficits   - Assess range of motion  - Assess patient's mobility; develop plan if impaired  - Assess patient's need for assistive devices and provide as appropriate  - Encourage maximum independence but intervene and supervise when necessary  - Involve family in performance of ADLs  - Assess for home care needs following discharge   - Consider OT consult to assist with ADL evaluation and planning for discharge  - Provide patient education as appropriate  5/6/2023 1235 by Amanda Harris RN  Outcome: Adequate for Discharge  5/6/2023 1234 by Amanda Harris RN  Outcome: Progressing  Goal: Maintains/Returns to pre admission functional level  Description: INTERVENTIONS:  - Perform BMAT or MOVE assessment daily    - Set and communicate daily mobility goal to care team and patient/family/caregiver  - Collaborate with rehabilitation services on mobility goals if consulted  - Perform Range of Motion 3 times a day  - Reposition patient every 2 hours    - Dangle patient 3 times a day  - Stand patient 3 times a day  - Ambulate patient 3 times a day  - Out of bed to chair 3 times a day   - Out of bed for meals 3 times a day  - Out of bed for toileting  - Record patient progress and toleration of activity level   5/6/2023 1235 by Marcus Nayak RN  Outcome: Adequate for Discharge  5/6/2023 1234 by Marcus Nayak RN  Outcome: Progressing

## 2023-05-06 NOTE — NURSING NOTE
AVS reviewed with patient including follow up appointments, discharge instructions and medications  Education given on crohns disease and food poisoning  Patient states understanding  All questions answered  Belongings accounted for per patient

## 2023-05-06 NOTE — ASSESSMENT & PLAN NOTE
Pt has hx of inflammatory bowel disease and saw GI once 2 weeks ago   - she was supposed to follow up this week but was rescheduled by physician office   - on CT abdomen/pelvis noted to have mild mucosal enhancement and submucosal wall thickening most notably involving the terminal ileum - infectious enteritis vs mild changes of active IBD  - d/w GI and discontinued IV Rocephin and flagyl 5/3 as no indication for antibiotics and can cause recurrence of diarrhea or worsen it  - clear liquid diet --> soft/lite meal --> low fiber/low residue  -Status post EGD which showed small hiatal hernia, slight antral erythema   -S/p colonoscopy which showed findings suggestive of Crohn's disease with possible nonbleeding visible vessel on the ileocecal valve with ulceration which was clipped  -1 dose of IV steroids today and transition to p o  prednisone tomorrow prior to discharge

## 2023-05-07 NOTE — ASSESSMENT & PLAN NOTE
On midodrine at home, continue on d/c  - dehydration secondary to diarrhea most likely exacerbated it and resulted in LOC  - Orthostatic vitals in ER (+)  - received IVF here

## 2023-05-07 NOTE — ASSESSMENT & PLAN NOTE
Pt has hx of inflammatory bowel disease and saw GI once 2 weeks ago   - she was supposed to follow up this week but was rescheduled by physician office   - on CT abdomen/pelvis noted to have mild mucosal enhancement and submucosal wall thickening most notably involving the terminal ileum - infectious enteritis vs mild changes of active IBD  - Initially started on IV Rocephin and flagyl which was d/c'ed on 5/3 as no indication for antibiotics  - clear liquid diet --> soft/lite meal --> tolerating low residue  - C diff neg, bacterial enteric panel (+) campylobacter - findings d/w pt   Currently no diarrhea and no indication for tx

## 2023-05-07 NOTE — ASSESSMENT & PLAN NOTE
Patient reported abd pain along with her diarrhea  -Status post EGD which showed small hiatal hernia, slight antral erythema   -S/p colonoscopy which showed findings suggestive of Crohn's disease with possible nonbleeding visible vessel on the ileocecal valve with ulceration which was clipped  -1 dose of IV solumedrol given and transitioned to p o  prednisone 40 mg daily for 21 days on d/c  F/U with GI after d/c

## 2023-05-08 ENCOUNTER — TRANSITIONAL CARE MANAGEMENT (OUTPATIENT)
Dept: FAMILY MEDICINE CLINIC | Facility: CLINIC | Age: 68
End: 2023-05-08

## 2023-05-08 NOTE — UTILIZATION REVIEW
NOTIFICATION OF ADMISSION DISCHARGE   This is a Notification of Discharge from [facility]  Please be advised that this patient has been discharge from our facility  Below you will find the admission and discharge date and time including the patient’s disposition  UTILIZATION REVIEW CONTACT:  Abraham Breaux  Utilization   Network Utilization Review Department  Phone: [phone] x carefully listen to the prompts  All voicemails are confidential   Email: [email or web address]     ADMISSION INFORMATION  PRESENTATION DATE: 5/1/2023  1:20 PM  OBERVATION ADMISSION DATE:   INPATIENT ADMISSION DATE: 5/1/23  5:25 PM   DISCHARGE DATE: 5/6/2023  3:43 PM   DISPOSITION:Home/Self Care    IMPORTANT INFORMATION:  Send all requests for admission clinical reviews, approved or denied determinations and any other requests to dedicated fax number below belonging to the campus where the patient is receiving treatment   List of dedicated fax numbers:  [address] DENIALS (Administrative/Medical Necessity) [phone]   [address] (Maternity/NICU/Pediatrics) [phone]   [facility] [phone]   [facility] 87 [phone]   [address] [phone]   220 [facility] [phone]   90 [facility] [phone]   [address] [place] [address] 119 [phone]   [facility]  [phone]   405 [facility] [phone]   412 [facility] [address] [facility] [phone]

## 2023-05-09 LAB
GAMMA INTERFERON BACKGROUND BLD IA-ACNC: 0.02 IU/ML
M TB IFN-G BLD-IMP: NEGATIVE
M TB IFN-G CD4+ BCKGRND COR BLD-ACNC: 0 IU/ML
M TB IFN-G CD4+ BCKGRND COR BLD-ACNC: 0 IU/ML
MITOGEN IGNF BCKGRD COR BLD-ACNC: 4.07 IU/ML

## 2023-05-10 LAB — CALPROTECTIN STL-MCNT: 409 UG/G (ref 0–120)

## 2023-05-11 ENCOUNTER — OFFICE VISIT (OUTPATIENT)
Dept: FAMILY MEDICINE CLINIC | Facility: CLINIC | Age: 68
End: 2023-05-11

## 2023-05-11 VITALS
BODY MASS INDEX: 27.39 KG/M2 | DIASTOLIC BLOOD PRESSURE: 84 MMHG | SYSTOLIC BLOOD PRESSURE: 142 MMHG | HEART RATE: 78 BPM | RESPIRATION RATE: 16 BRPM | WEIGHT: 164.38 LBS | TEMPERATURE: 98.6 F | HEIGHT: 65 IN

## 2023-05-11 DIAGNOSIS — R55 SYNCOPE, UNSPECIFIED SYNCOPE TYPE: ICD-10-CM

## 2023-05-11 DIAGNOSIS — F41.9 ANXIETY AND DEPRESSION: ICD-10-CM

## 2023-05-11 DIAGNOSIS — A04.5 CAMPYLOBACTER DIARRHEA: Primary | ICD-10-CM

## 2023-05-11 DIAGNOSIS — I95.1 ORTHOSTATIC HYPOTENSION: ICD-10-CM

## 2023-05-11 DIAGNOSIS — K50.90 CROHN'S DISEASE WITHOUT COMPLICATION, UNSPECIFIED GASTROINTESTINAL TRACT LOCATION (HCC): ICD-10-CM

## 2023-05-11 DIAGNOSIS — F32.A ANXIETY AND DEPRESSION: ICD-10-CM

## 2023-05-11 DIAGNOSIS — E78.2 MIXED HYPERLIPIDEMIA: ICD-10-CM

## 2023-05-11 RX ORDER — SODIUM FLUORIDE 6 MG/ML
PASTE, DENTIFRICE DENTAL
COMMUNITY
Start: 2023-04-04

## 2023-05-11 NOTE — PATIENT INSTRUCTIONS
Crohn Disease   WHAT YOU NEED TO KNOW:   What is Crohn disease? Crohn disease is an inflammatory disease of the digestive system  Crohn disease causes the lining of your intestines to become inflamed  The lining of your mouth, esophagus, or stomach may also be affected by Crohn disease  What causes or increases my risk for Crohn disease? It is not known exactly what causes Crohn disease  Any of the following may increase your risk:  A family history of Crohn disease    Smoking cigarettes    Immune system overreacts to bacteria or a virus in the digestive tract    Too much sugar or animal fat and protein, such as too much beef, chicken, or fish    An infection, such as Salmonella, Campylobacter, or Clostridium difficile    Tumor necrosis factor    What are the signs and symptoms of Crohn disease? You may have different symptoms at different times  Your symptoms may come and go with quiet and active periods  Over time, active periods may occur more often and symptoms may be more severe  The most common signs and symptoms include the following:  Cramping pain on the lower right side of your abdomen    Diarrhea that may be dark or tar-colored, or blood in your bowel movements    Fever    More mentally and physically tired than usual (fatigue)    Nausea, loss of appetite, or weight loss without trying    Slow growth or failure to thrive (in children)    How is Crohn disease diagnosed? Blood tests  may be needed to check for infection or health problems caused by Crohn disease, such as low iron levels  A bowel movement sample  may show if bacteria are causing your illness  A colonoscopy  is a test that is done to look at your colon  A tube with a light on the end will be put into your anus, and then moved forward into your colon  A barium enema  is an x-ray of the colon  A tube is put into your anus, and a liquid called barium is put through the tube   Barium is used so that your healthcare provider can see your colon better  A barium swallow  is an x-ray of your throat and esophagus  This test may also be called a barium esophagram  You will drink a thick liquid called barium  Barium helps your esophagus and stomach show up better on x-rays  Follow the instructions of your healthcare provider before and after the test     An endoscopy  is a test that uses a scope to see the inside of your digestive tract, including the esophagus and stomach  Samples may be taken from your digestive tract and sent to a lab for tests  Bleeding may also be treated during an endoscopy  MRI or CT  pictures may be taken of your digestive system and other organs  You may be given contrast liquid to help the pictures show up better  Tell the healthcare provider if you have ever had an allergic reaction to contrast liquid  Do not enter the MRI room with anything metal  Metal can cause serious injury  Tell the healthcare provider if you have any metal in or on your body  An ultrasound  is a test that uses sound waves to look at pictures of your digestive system  How is Crohn disease treated? Medicines  may be used to decrease inflammation in your digestive tract  You may need antibiotics to treat or prevent an infection and antidiarrheal medicine to decrease diarrhea  Immunosuppressants may also be given to slow your immune system  Surgery  may be needed to decrease your symptoms or to correct problems such as blockage or bleeding  Your healthcare provider may remove the diseased part of your intestines and reconnect the healthy parts  You may also need to have a colostomy  How can I manage Crohn disease? Do not smoke  Nicotine and other chemicals in cigarettes and cigars can cause lung damage and increase your risk for Crohn disease  Ask your healthcare provider for information if you currently smoke and need help to quit  E-cigarettes or smokeless tobacco still contain nicotine   Talk to your healthcare provider before you use these products  Take your medicines exactly as directed  This may help to keep your disease in remission (no symptoms)  Keep a record of everything you eat and drink  Include any symptoms the food or drink causes or makes worse  You may need to avoid certain foods  Dairy, alcohol, hot spices, and high-fiber foods are common examples of foods that may worsen your symptoms  Your healthcare provider may recommend that you take vitamins or minerals  Always ask your healthcare provider before you take vitamins or nutritional supplements  Call your local emergency number [phone] in the [facility] [facility]) if:   You suddenly have trouble breathing  You have a fast heart rate, fast breathing, or are too dizzy to stand  When should I seek immediate care? You vomit blood, or your vomit looks like coffee grounds  You have severe pain in your stomach  When should I call my doctor? You have tar-colored bowel movements or you see blood in your bowel movements  You have a fever or chills  The pain in your abdomen does not go away or gets worse after you take medicine  Your abdomen is swollen  You are losing weight without trying  You have questions or concerns about your condition or care  CARE AGREEMENT:   You have the right to help plan your care  Learn about your health condition and how it may be treated  Discuss treatment options with your healthcare providers to decide what care you want to receive  You always have the right to refuse treatment  The above information is an  only  It is not intended as medical advice for individual conditions or treatments  Talk to your doctor, nurse or pharmacist before following any medical regimen to see if it is safe and effective for you  © Copyright [employer] 2022 Information is for End User's use only and may not be sold, redistributed or otherwise used for commercial purposes

## 2023-05-11 NOTE — PROGRESS NOTES
"Assessment/Plan:    1  Campylobacter diarrhea  Comments:  denies any diarrhea and had regular BM    2  Crohn's disease without complication, unspecified gastrointestinal tract location [facility])  Assessment & Plan: Will be following with gastro next week to discuss treatment for it      3  Syncope, unspecified syncope type  Comments:  denies any issues currently and followed up with cardiologist today and will be doing ECHO soon    4  Orthostatic hypotension  Comments:  stable with midodrine    5  Anxiety and depression  Assessment & Plan:  Managed by cardiologist      6  Mixed hyperlipidemia  Assessment & Plan:  Complaint with statin and tolerating it well              Patient Instructions:  Supportive care discussed and advised  Advised to RTO for any worsening and no improvement  Follow up for no improvement and worsening of conditions  Patient advised and educated when to see immediate medical care  Return if symptoms worsen or fail to improve  Future Appointments   Date Time Provider Nick Hancock   5/16/2023  2:30 PM SHRAVAN [facility]   5/17/2023  1:45 PM SHRAVAN Sandoval [facility]   5/18/2023  6:45 AM [place]  [address]   5/24/2023  5:00 PM Clay Pineda DO [facility]TH[facility]   6/9/2023  1:30 PM Gonzales Rosario   6/26/2023 11:00 AM Moises Stein, PhD [facility]           Subjective:      Patient ID: Vane Martinez is a 78 y o  female  Chief Complaint   Patient presents with   • Transition of Care Management     [facility]          Vitals:  /84   Pulse 78   Temp 98 6 °F (37 °C) (Tympanic)   Resp 16   Ht 5' 5\" (1 651 m)   Wt 74 6 kg (164 lb 6 oz)   LMP  (LMP Unknown)   BMI 27 35 kg/m²     HPI  Patient is here to follow up after recent hospitalization  Stated that feeling much better  Not having any diarrhea and had regular BM today    Denies any passing out feeling and dizziness " and had follow up with cardiologist who will do her ECHO soon  Will be following with gastro next week to discuss crohn's disease treatment  Complaint with chronic medications and tolerating it well    PHQ-2/9 Depression Screening    Little interest or pleasure in doing things: 0 - not at all  Feeling down, depressed, or hopeless: 1 - several days  Trouble falling or staying asleep, or sleeping too much: 0 - not at all  Feeling tired or having little energy: 1 - several days  Poor appetite or overeatin - several days  Feeling bad about yourself - or that you are a failure or have let yourself or your family down: 0 - not at all  Trouble concentrating on things, such as reading the newspaper or watching television: 0 - not at all  Moving or speaking so slowly that other people could have noticed  Or the opposite - being so fidgety or restless that you have been moving around a lot more than usual: 0 - not at all  Thoughts that you would be better off dead, or of hurting yourself in some way: 0 - not at all  PHQ-9 Score: 3   PHQ-9 Interpretation: No or Minimal depression        TCM Call     Date and time call was made  2023  8:24 AM    Hospital care reviewed  Records reviewed    Patient was hospitialized at  [address] [place] [address]    Date of Admission  23    Date of discharge  23    Diagnosis  Diarrhea    Disposition  Home    Were the patients medications reviewed and updated  Yes    Current Symptoms  None    Dizziness severity  Moderate    Upper abdominal pain severity  Severe    Upper abdominal pain onset  Ongoing      TCM Call     Post hospital issues  None    Should patient be enrolled in anticoag monitoring? No    Scheduled for follow up?   Yes    Patient refusal reason  She did not call back and came in to be seen    Patients specialists  Other (comment)    Cardiologist name  Dr Carter Duverney    Other specialists names  Dr Roula Moon    Did you obtain your prescribed medications  Yes    Do you need help managing your prescriptions or medications  No    Is transportation to your appointment needed  No    I have advised the patient to call PCP with any new or worsening symptoms  [address] or [address]    Support System  Family    The type of support provided  Physical; Emotional    Do you have social support  Yes, as much as I need    Are you recieving any outpatient services  No    What type of services  [facility]    Are you recieving home care services  No    Are you using any community resources  No    Current waiver services  No    Have you fallen in the last 12 months  Yes    How many times  3-4 times    Interperter language line needed  No    Counseling  Patient    Counseling topics  Activities of daily living; patient and family education; instructions for management; Risk factor reduction; Importance of RX compliance    Comments  Tiago Calles states that she is doing much better  No bowel movement since her diarrhea has stopped  She knows to go to the ER if any abd pain, chest pain, dypsnea and she will follow up with Dr Wilberto De Leon as instructed Jatin Padgett              The following portions of the patient's history were reviewed and updated as appropriate: allergies, current medications, past family history, past medical history, past social history, past surgical history and problem list       Review of Systems   HENT: Negative  Respiratory: Negative  Cardiovascular: Negative  Gastrointestinal: Negative  Genitourinary: Negative for difficulty urinating  Neurological: Negative  Psychiatric/Behavioral: Negative  Objective:    Social History     Tobacco Use   Smoking Status Never   Smokeless Tobacco Never       Allergies:    Allergies   Allergen Reactions   • Meperidine Lightheadedness and Other (See Comments)   • Sulfa Antibiotics Hives         Current Outpatient Medications   Medication Sig Dispense Refill   • acetaminophen (TYLENOL) 650 mg CR tablet Take 1 tablet (650 mg total) by mouth every 8 (eight) hours as needed for headaches, moderate pain or mild pain  0   • albuterol (ProAir HFA) 90 mcg/act inhaler Inhale 2 puffs every 6 (six) hours as needed for wheezing 8 5 g 0   • atorvastatin (LIPITOR) 20 mg tablet TAKE 1 TABLET BY MOUTH EVERY DAY 90 tablet 2   • budesonide (Pulmicort Flexhaler) 90 MCG/ACT inhaler Inhale 1 puff 2 (two) times a day 1 each 5   • Calcium Carb-Cholecalciferol 600-12 5 MG-MCG CAPS Take by mouth daily in the early morning     • [facility] Aspirin Adult Low Strength 81 MG EC tablet CHEW AND SWALLOW 1 TABLET BY MOUTH ONCE DAILY     • DULoxetine (CYMBALTA) 60 mg delayed release capsule Take 1 capsule (60 mg total) by mouth daily 90 capsule 1   • esomeprazole (NexIUM) 40 MG capsule Take 1 capsule (40 mg total) by mouth daily before breakfast 90 capsule 5   • gabapentin (NEURONTIN) 300 mg capsule Take 1 capsule (300 mg total) by mouth 3 (three) times a day 90 capsule 5   • levothyroxine 50 mcg tablet TAKE 1 TABLET BY MOUTH EVERY DAY 90 tablet 0   • Magnesium 400 MG TABS Take by mouth daily     • meloxicam (MOBIC) 7 5 mg tablet Take 1 tablet (7 5 mg total) by mouth daily 30 tablet 1   • midodrine (PROAMATINE) 10 MG tablet Take 1 tablet (10 mg total) by mouth 2 (two) times a day     • predniSONE 20 mg tablet Take 2 tablets (40 mg total) by mouth daily for 20 doses Do not start before May 7, 2023  40 tablet 0   • QUEtiapine (SEROquel) 100 mg tablet Take 1 tablet (100 mg total) by mouth daily at bedtime 90 tablet 1   • Sodium Fluoride 5000 PPM 1 1 % PSTE USE ONCE DAILY PREFERABLY AT NIGHT     • sucralfate (CARAFATE) 1 g tablet Take 1 tablet (1 g total) by mouth 4 (four) times a day 120 tablet 0   • calcium carbonate (OS-WILLY) 1250 (500 Ca) MG tablet Take 1,250 mg by mouth (Patient not taking: Reported on 5/8/2023)       No current facility-administered medications for this visit  Physical Exam  Vitals reviewed     Constitutional: Appearance: She is well-developed  Cardiovascular:      Rate and Rhythm: Normal rate and regular rhythm  Heart sounds: Normal heart sounds  Pulmonary:      Effort: Pulmonary effort is normal       Breath sounds: Normal breath sounds  Abdominal:      General: Bowel sounds are normal       Palpations: Abdomen is soft  Tenderness: There is no abdominal tenderness  Skin:     General: Skin is warm and dry  Neurological:      Mental Status: She is alert and oriented to person, place, and time  Psychiatric:         Behavior: Behavior normal          Thought Content:  Thought content normal          Judgment: Judgment normal                      SHRAVAN Luo

## 2023-05-17 ENCOUNTER — OFFICE VISIT (OUTPATIENT)
Dept: NEUROLOGY | Facility: CLINIC | Age: 68
End: 2023-05-17

## 2023-05-17 VITALS
WEIGHT: 163.2 LBS | HEIGHT: 65 IN | SYSTOLIC BLOOD PRESSURE: 100 MMHG | DIASTOLIC BLOOD PRESSURE: 67 MMHG | OXYGEN SATURATION: 97 % | HEART RATE: 89 BPM | BODY MASS INDEX: 27.19 KG/M2

## 2023-05-17 DIAGNOSIS — R42 DIZZINESS AND GIDDINESS: ICD-10-CM

## 2023-05-17 DIAGNOSIS — G95.9 CERVICAL MYELOPATHY WITH CERVICAL RADICULOPATHY (HCC): ICD-10-CM

## 2023-05-17 DIAGNOSIS — G62.9 PERIPHERAL NEUROPATHY: Primary | ICD-10-CM

## 2023-05-17 DIAGNOSIS — M54.12 CERVICAL MYELOPATHY WITH CERVICAL RADICULOPATHY (HCC): ICD-10-CM

## 2023-05-17 DIAGNOSIS — I95.1 ORTHOSTATIC HYPOTENSION: ICD-10-CM

## 2023-05-17 RX ORDER — GABAPENTIN 300 MG/1
300 CAPSULE ORAL 3 TIMES DAILY
Qty: 90 CAPSULE | Refills: 5 | Status: SHIPPED | OUTPATIENT
Start: 2023-05-17

## 2023-05-17 NOTE — PATIENT INSTRUCTIONS
Continues on Midodrine 10mg twice a day  Remember to increase your oral hydration to prevent dehydration and increased dizziness  Continue with Gabapentin 300mg 3x a day   Follow up with Neurology office in 6 months or sooner if needed

## 2023-05-17 NOTE — PROGRESS NOTES
Patient ID: Rj Lr is a 78 y o  female  Assessment/Plan:     Diagnoses and all orders for this visit:    Peripheral neuropathy  -     gabapentin (NEURONTIN) 300 mg capsule; Take 1 capsule (300 mg total) by mouth 3 (three) times a day    Cervical myelopathy with cervical radiculopathy (HCC)  -     gabapentin (NEURONTIN) 300 mg capsule; Take 1 capsule (300 mg total) by mouth 3 (three) times a day    Orthostatic hypotension    Dizziness and giddiness       Continues on Midodrine 10mg twice a day  Remember to increase your oral hydration to prevent dehydration and increased dizziness  Continue with Gabapentin 300mg 3x a day   Follow up with Neurology office in 6 months or sooner if needed  Subjective/HPI:  Rj Lr is a 74yo female who follows with the outpatient neurology office for medical management of neuropathy, FM, cervical radiculopathy and dizziness  Dizziness:  Patient has history of orthostatic hypotension, she was started on midodrine and titrated up to 10 mg 3 times a day as of last office visit in December 2022  She reported since making the change having less episode of dizziness  She also had a loop recorder placed for cardiac monitoring  Last office appointment patient reported having some medication adjustments to her antihypertensive, her midodrine stated 10 mg 3 times daily  She was reporting some intermittent headaches at that time which are unusual for her  Recommended patient take her blood pressures during these times as he is generally in the middle of the afternoon and her antihypertensives were lowered however her midodrine remained at higher dosing  Patient is currently on midodrine 10 mg twice a day  Patient reports back to neurology office today, she notes having some increased episodes of dizziness a few weeks ago  She reports having a diarrheal illness for a few days and had become significantly dehydrated    She states she had the dizziness, was having difficulties walking and did fall  She had gone to the hospital shortly thereafter and was in the hospital for couple days regarding this  They did diagnose her with Crohn's  Since being rehydrated, patient has not had any further episodes of dizziness or falls  She is taking her midodrine 10 mg twice a day and tolerating it well  Patient reviewed ports with regards to the headaches, they have resolved she has had no further issues  Cervical/lumbar radiculopathy:  Patient reported ongoing issues with lumbar radiculopathy, she is on gabapentin 600 mg twice a day  MRI of the lumbar spine with asymmetric L4-5 foraminal stenosis  She had also gone through anterior fusion at C3-5 and has had persistent left C4-6 foraminal stenosis indicating possible radiculitis for which she went to see neurosurgery  Last office appointment patient reported a psychiatric hospitalization for which some of her medications were changed  She was then discharged home on gabapentin 300 mg 3 times daily, Cymbalta 60 mg daily as well as magnesium and Seroquel 100 mg at bedtime  Patient currently remains on her Cymbalta 60 mg daily  Same dose of gabapentin 300 mg 3 times daily and her Seroquel remains at 100 mg daily  Patient returns the office today, feels as though her radiculopathy has been well maintained on her current gabapentin dosing  She was also scheduled to be seeing neurosurgery through the [place] [id number], they ordered EMG for her upper and lower extremities to rule out component of ulnar and median neuropathy and reevaluate radicular symptoms  Patient reports she did have the EMG done, she has not followed up with neurosurgery afterwards  They did not indicate any significant abnormalities that required her to follow-up that she is aware of  We reviewed the EMG outcome:  EMG of the UE and noted some radicular findings on the Rt from C6-T1 and Lt C8-T1      With regards to her pain, patient notes having less pain at this point in time  She seems to be doing well on her Cymbalta and current gabapentin dose  She has noted however few weeks ago having significant cramping causing some contractures in the bilateral hands  This had not happened now for a couple of weeks  She discussed with her cardiologist who recommended quinine or tonic water  Also recommended by this provider use of tonic water and magnesium however patient is already taking magnesium on a daily basis  Discussed with her the possibility of this happening due to dehydration, encouraged her to continue to hydrate well  She should bring water or a bottle of water with her whenever she is out in order to maintain good hydration going forward  Patient is doing well on her current gabapentin dose, reminding on 300 mg 3 times a day  Neuropathy:  Patient had EMG of the lower extremities in March 2021, left-sided superficial peroneal neuropathy noted otherwise study was normal   Again patient had remained on Cymbalta and gabapentin for her radicular/neuropathic pain  Upper and lower extremity EMGs were completed in January 2023, results are indicated as above  Noted some radicular symptoms however no significant findings for neuropathy  She reports that her symptomology has been well controlled under her current gabapentin dose as well as her Cymbalta  She is also on Seroquel which seems to be maintaining her fairly well  No further changes will be needed at this time  Patient is doing well, she will follow-up with outpatient neurology office in 6 months or sooner if needed        The following portions of the patient's history were reviewed and updated as appropriate: allergies, current medications, past family history, past medical history, past social history, past surgical history and problem list              Past Medical History:   Diagnosis Date   • Abdominal adhesions    • Anesthesia complication     difficult to wake up   • Anxiety    • Arthritis    • Cancer ([facility] [address] )     melanoma   • Colon polyp    • Depression    • Depression    • Disease of thyroid gland    • Fibromyalgia    • Fibromyalgia, primary    • Fractures 2006   • GERD (gastroesophageal reflux disease)    • History of cholecystectomy 09/07/2019   • Hyperlipidemia    • Irregular heart beat     can be tachy; also has orthoststic hypotension   • Lazy eye     resolved: 3/27/17   • Medical clearance for psychiatric admission 07/13/2021   • Melanoma ([facility] ) 2010   • Osteoarthritis 07/2018   • Osteopenia     with joint pain-elevated DEV   • Psychiatric disorder    • Shortness of breath     assoc with tachycardia   • Stomach disorder 1995   • Tinnitus    • Wears glasses     for reading       Past Surgical History:   Procedure Laterality Date   • ABDOMINAL SURGERY      lysis of adhesions x 2   • APPENDECTOMY     • CERVICAL FUSION      May 5,2021 and Jan 01,2020   • CHOLECYSTECTOMY      open   • COLONOSCOPY     • DILATION AND CURETTAGE OF UTERUS     • FOOT SURGERY  05/2017   • NECK SURGERY  01/2019 5/2021   • NEUROMA EXCISION Right 05/26/2017    Procedure: EXCISION MASS / FIBROMA FOOT;  Surgeon: Harriet Sabillon DPM;  Location: [address] [place] [address];  Service:    • PARS PLANA VITRECTOMY W/ REPAIR OF MACULAR HOLE  12/23/2020   • CA XCAPSL CTRC RMVL INSJ IO LENS PROSTH W/O ECP Left 12/06/2021    Procedure: EXTRACTION EXTRACAPSULAR CATARACT PHACO INTRAOCULAR LENS (IOL);   Surgeon: Ana Delgado MD;  Location[facility] MAIN OR;  Service: Ophthalmology   • RIGHT OOPHORECTOMY     • WISDOM TOOTH EXTRACTION      x4       Social History     Socioeconomic History   • Marital status: /Civil Union     Spouse name: None   • Number of children: None   • Years of education: None   • Highest education level: None   Occupational History   • None   Tobacco Use   • Smoking status: Never   • Smokeless tobacco: Never   Vaping Use   • Vaping Use: Never used   Substance and Sexual Activity   • Alcohol use: Not Currently   • Drug use: No   • Sexual activity: Not Currently     Partners: Male   Other Topics Concern   • None   Social History Narrative   • None     Social Determinants of Health     Financial Resource Strain: Not on file   Food Insecurity: Not on file   Transportation Needs: No Transportation Needs   • Lack of Transportation (Medical): No   • Lack of Transportation (Non-Medical):  No   Physical Activity: Not on file   Stress: Not on file   Social Connections: Not on file   Intimate Partner Violence: Not on file   Housing Stability: Unknown   • Unable to Pay for Housing in the Last Year: No   • Number of Places Lived in the Last Year: Not on file   • Unstable Housing in the Last Year: No       Family History   Problem Relation Age of Onset   • Heart disease Mother    • Stroke Mother 58   • COPD Mother    • Arthritis Mother    • Hypertension Father    • Kidney disease Brother         kidney transplant   • Multiple sclerosis Sister    • No Known Problems Maternal Aunt    • No Known Problems Maternal Uncle    • No Known Problems Paternal Aunt    • No Known Problems Paternal Uncle    • No Known Problems Maternal Grandmother    • No Known Problems Maternal Grandfather    • No Known Problems Paternal Grandmother    • No Known Problems Paternal Grandfather    • Dislocations Sister    • Neurological problems Sister    • Scoliosis Sister    • ADD / ADHD Neg Hx    • Anesthesia problems Neg Hx    • Cancer Neg Hx    • Clotting disorder Neg Hx    • Collagen disease Neg Hx    • Diabetes Neg Hx    • Dislocations Neg Hx    • Learning disabilities Neg Hx    • Neurological problems Neg Hx    • Osteoporosis Neg Hx    • Rheumatologic disease Neg Hx    • Scoliosis Neg Hx    • Vascular Disease Neg Hx          Current Outpatient Medications:   •  acetaminophen (TYLENOL) 650 mg CR tablet, Take 1 tablet (650 mg total) by mouth every 8 (eight) hours as needed for headaches, moderate pain or mild pain, Disp: , Rfl: 0  •  albuterol (ProAir HFA) 90 mcg/act inhaler, Inhale 2 puffs every 6 (six) hours as needed for wheezing, Disp: 8 5 g, Rfl: 0  •  atorvastatin (LIPITOR) 20 mg tablet, TAKE 1 TABLET BY MOUTH EVERY DAY, Disp: 90 tablet, Rfl: 2  •  budesonide (Pulmicort Flexhaler) 90 MCG/ACT inhaler, Inhale 1 puff 2 (two) times a day, Disp: 1 each, Rfl: 5  •  Calcium Carb-Cholecalciferol 600-12 5 MG-MCG CAPS, Take by mouth daily in the early morning, Disp: , Rfl:   •  [employer] Aspirin Adult Low Strength 81 MG EC tablet, CHEW AND SWALLOW 1 TABLET BY MOUTH ONCE DAILY, Disp: , Rfl:   •  DULoxetine (CYMBALTA) 60 mg delayed release capsule, Take 1 capsule (60 mg total) by mouth daily, Disp: 90 capsule, Rfl: 1  •  esomeprazole (NexIUM) 40 MG capsule, Take 1 capsule (40 mg total) by mouth daily before breakfast, Disp: 90 capsule, Rfl: 5  •  gabapentin (NEURONTIN) 300 mg capsule, Take 1 capsule (300 mg total) by mouth 3 (three) times a day, Disp: 90 capsule, Rfl: 5  •  levothyroxine 50 mcg tablet, TAKE 1 TABLET BY MOUTH EVERY DAY, Disp: 90 tablet, Rfl: 0  •  Magnesium 400 MG TABS, Take by mouth daily, Disp: , Rfl:   •  meloxicam (MOBIC) 7 5 mg tablet, Take 1 tablet (7 5 mg total) by mouth daily, Disp: 30 tablet, Rfl: 1  •  midodrine (PROAMATINE) 10 MG tablet, Take 1 tablet (10 mg total) by mouth 2 (two) times a day, Disp: , Rfl:   •  predniSONE 20 mg tablet, Take 2 tablets (40 mg total) by mouth daily for 20 doses Do not start before May 7, 2023 , Disp: 40 tablet, Rfl: 0  •  QUEtiapine (SEROquel) 100 mg tablet, Take 1 tablet (100 mg total) by mouth daily at bedtime, Disp: 90 tablet, Rfl: 1  •  Sodium Fluoride 5000 PPM 1 1 % PSTE, USE ONCE DAILY PREFERABLY AT NIGHT, Disp: , Rfl:   •  sucralfate (CARAFATE) 1 g tablet, Take 1 tablet (1 g total) by mouth 4 (four) times a day, Disp: 120 tablet, Rfl: 0  •  calcium carbonate (OS-WILLY) 1250 (500 Ca) MG tablet, Take 1,250 mg by mouth (Patient not taking: Reported on 5/8/2023), Disp: , Rfl:     Allergies   Allergen Reactions   • "Meperidine Lightheadedness and Other (See Comments)   • Sulfa Antibiotics Hives        Blood pressure 100/67, pulse 89, height 5' 5\" (1 651 m), weight 74 kg (163 lb 3 2 oz), SpO2 97 %  Objective:    Blood pressure 100/67, pulse 89, height 5' 5\" (1 651 m), weight 74 kg (163 lb 3 2 oz), SpO2 97 %  Physical Exam  Vitals reviewed  Constitutional:       Appearance: Normal appearance  She is well-developed  HENT:      Head: Normocephalic  Mouth/Throat:      Mouth: Mucous membranes are moist    Eyes:      General: Lids are normal       Extraocular Movements: Extraocular movements intact  Pupils: Pupils are equal, round, and reactive to light  Cardiovascular:      Rate and Rhythm: Normal rate  Pulmonary:      Effort: Pulmonary effort is normal    Abdominal:      Palpations: Abdomen is soft  Musculoskeletal:      Cervical back: Normal range of motion  Skin:     General: Skin is warm and dry  Neurological:      Mental Status: She is alert  Motor: Motor strength is normal       Coordination: Romberg sign negative  Deep Tendon Reflexes: Reflexes are normal and symmetric  Psychiatric:         Attention and Perception: Attention normal          Mood and Affect: Mood and affect normal          Speech: Speech normal          Behavior: Behavior normal  Behavior is cooperative  Thought Content: Thought content normal          Judgment: Judgment normal          Neurological Exam  Mental Status  Alert  Oriented to person, place, time and situation  Recent and remote memory are intact  Speech is normal  Language is fluent with no aphasia  Attention and concentration are normal  Fund of knowledge is appropriate for level of education  Cranial Nerves  CN II: Visual acuity is normal  Visual fields full to confrontation  CN III, IV, VI: Extraocular movements intact bilaterally  Normal lids and orbits bilaterally  Pupils equal round and reactive to light bilaterally    CN V: Facial " sensation is normal   CN VII: Full and symmetric facial movement  CN VIII: Hearing is normal   CN IX, X: Palate elevates symmetrically  Normal gag reflex  CN XI: Shoulder shrug strength is normal   CN XII: Tongue midline without atrophy or fasciculations  Motor  Normal muscle bulk throughout  Normal muscle tone  No abnormal involuntary movements  Strength is 5/5 throughout all four extremities  Sensory  Light touch abnormality: Temperature abnormality: Vibration is normal in upper and lower extremities  Proprioception is normal in upper and lower extremities  Sensation: Patient with slightly diminished sensations to the right upper extremity compared to left  Reflexes  Deep tendon reflexes are 2+ and symmetric in all four extremities  Right pathological reflexes: Malik's absent  Left pathological reflexes: Malik's absent  Coordination  Right: Finger-to-nose normal  Rapid alternating movement normal  Heel-to-shin normal Left: Finger-to-nose normal  Rapid alternating movement normal  Heel-to-shin normal     Gait  Casual gait is normal including stance, stride, and arm swing  Normal toe walking  Normal heel walking  Normal tandem gait  Romberg is absent  Able to rise from chair without using arms  ROS:    Review of Systems   Constitutional: Negative  Negative for appetite change and fever  HENT: Positive for tinnitus  Negative for hearing loss, trouble swallowing and voice change  Eyes: Negative  Negative for photophobia, pain and visual disturbance  Respiratory: Negative  Negative for shortness of breath  Cardiovascular: Negative  Negative for palpitations  Gastrointestinal: Negative  Negative for nausea and vomiting  Endocrine: Negative  Negative for cold intolerance  Genitourinary: Negative  Negative for dysuria, frequency and urgency  Musculoskeletal: Positive for myalgias  Negative for gait problem and neck pain  Skin: Negative  Negative for rash  Allergic/Immunologic: Negative  Neurological: Negative  Negative for dizziness, tremors, seizures, syncope, facial asymmetry, speech difficulty, weakness, light-headedness, numbness and headaches  Hematological: Negative  Does not bruise/bleed easily  Psychiatric/Behavioral: Positive for sleep disturbance  Negative for confusion and hallucinations  ROS reviewed and discussed with patient

## 2023-05-18 ENCOUNTER — HOSPITAL ENCOUNTER (OUTPATIENT)
Dept: RADIOLOGY | Facility: HOSPITAL | Age: 68
Discharge: HOME | End: 2023-05-18
Attending: ORTHOPAEDIC SURGERY

## 2023-05-18 DIAGNOSIS — M17.11 PRIMARY OSTEOARTHRITIS OF RIGHT KNEE: ICD-10-CM

## 2023-05-18 DIAGNOSIS — G89.29 CHRONIC PAIN OF RIGHT KNEE: ICD-10-CM

## 2023-05-18 DIAGNOSIS — M25.561 CHRONIC PAIN OF RIGHT KNEE: ICD-10-CM

## 2023-05-18 DIAGNOSIS — M23.91 INTERNAL DERANGEMENT OF RIGHT KNEE: ICD-10-CM

## 2023-05-23 ENCOUNTER — OFFICE VISIT (OUTPATIENT)
Dept: GASTROENTEROLOGY | Facility: CLINIC | Age: 68
End: 2023-05-23

## 2023-05-23 VITALS
HEART RATE: 91 BPM | WEIGHT: 164 LBS | SYSTOLIC BLOOD PRESSURE: 120 MMHG | HEIGHT: 65 IN | DIASTOLIC BLOOD PRESSURE: 85 MMHG | BODY MASS INDEX: 27.32 KG/M2

## 2023-05-23 DIAGNOSIS — K50.80 CROHN'S DISEASE OF BOTH SMALL AND LARGE INTESTINE WITHOUT COMPLICATION (HCC): Primary | ICD-10-CM

## 2023-05-23 RX ORDER — METHYLPREDNISOLONE SODIUM SUCCINATE 40 MG/ML
40 INJECTION, POWDER, LYOPHILIZED, FOR SOLUTION INTRAMUSCULAR; INTRAVENOUS ONCE
OUTPATIENT
Start: 2023-06-06

## 2023-05-23 RX ORDER — SODIUM CHLORIDE 9 MG/ML
20 INJECTION, SOLUTION INTRAVENOUS ONCE
OUTPATIENT
Start: 2023-06-06

## 2023-05-23 RX ORDER — ACETAMINOPHEN 325 MG/1
650 TABLET ORAL ONCE
OUTPATIENT
Start: 2023-06-06

## 2023-05-23 RX ORDER — PREDNISONE 10 MG/1
30 TABLET ORAL DAILY
Qty: 120 TABLET | Refills: 1 | Status: SHIPPED | OUTPATIENT
Start: 2023-05-23

## 2023-05-23 RX ORDER — DIPHENHYDRAMINE HCL 25 MG
25 TABLET ORAL ONCE
OUTPATIENT
Start: 2023-06-06

## 2023-05-23 NOTE — PROGRESS NOTES
[facility] - Outpatient Follow-up Note  Kameron Wilson 78 y o  female MRN: [id number]  Encounter: [id number]          ASSESSMENT AND PLAN:      1  Crohn's disease of both small and large intestine without complication ([address] )  Clinical picture most consistent with Crohn's disease with several months of symptoms and imaging revealing chronic inflammation of the terminal ileum, colonoscopy with ileal inflammation and ileocecal valve ulceration  We will have her start weaning down her dose of prednisone  We discussed options for ongoing management and we will plan to initiate infliximab  Will check CT enterography given her inability to advance her diet  We will follow-up here in a month or so    - predniSONE 10 mg tablet; Take 3 tablets (30 mg total) by mouth daily 3 pills daily for 1 week, then 2 pills daily for 1 week, then 1 pill daily for 1 week  Dispense: 120 tablet; Refill: 1  - CT small bowel enterography; Future    ______________________________________________________________________    SUBJECTIVE: Patient returns in follow-up of recent hospitalization  She presented to the emergency room initially in February with several days of nausea, vomiting and abdominal pain and underwent CT scan at that time suggesting inflammatory changes in the terminal ileum consistent with Crohn's  Had persistent symptoms and returned to the emergency room in early May where CT scan revealed terminal ileal inflammation  Fecal calprotectin, CRP and ESR were elevated  Colonoscopy and EGD were performed revealing terminal ileal inflammation and ulceration of the ileocecal valve  Biopsies revealed nonspecific inflammation    Patient has been on prednisone 40 mg daily with resolution of her diarrhea and overall improvement of symptoms but has been unable to expand her diet with worsening discomfort when she attempted to increase fiber      REVIEW OF SYSTEMS:    Review of Systems   HENT: Negative for sore throat  Cardiovascular: Negative for chest pain  Respiratory: Negative for shortness of breath  Skin: Negative for rash      Answers for HPI/ROS submitted by the patient on 5/22/2023  When you are not experiencing symptoms of your inflammatory bowel disease, how many bowel movements do you typically have each day?: 0-1  What is the average (typical) number of bowel movements that you had in a single day during the last week?: 2  Over the last 3 days, have you had any bowel movements where you passed blood without stool?: No  Since your last visit, have you received any vaccinations?: No  Since the last visit, have you had an infection?: No  During the last year, how many days have you missed work or school because of your inflammatory bowel disease?: 10  During the last year, how many days have you been hospitalized because of your inflammatory bowel disease?: 1  During the last year, how many days have you visited a hospital emergency department because of your inflammatory bowel disease?: 2  During the last month, have you taken narcotic pain medications (such as Percocet, oxycodone, Oxycontin, morphine, Vicodin, Dilaudid, MS Contin) for your inflammatory bowel disease?: No  Have you awoken at night because you needed to move your bowels during the last month? : No  Have you had leakage of stool while sleeping during the last month?: No  Have you had leakage of stool while you were awake during the last month?: No  In the last 6 months, have you unintentionally lost weight?: No  Fever: No  Eye irritation: No  Mouth sores: No  Numbness or tingling in your hands or feet: No  Pain or swelling in your joints: Yes  Bruising or bleeding: No  Felt depressed or blue: No          Historical Information   Past Medical History:   Diagnosis Date   • Abdominal adhesions    • Anesthesia complication     difficult to wake up   • Anxiety    • Arthritis    • Cancer ([facility] )     melanoma   • Colon polyp    • Depression • Depression    • Disease of thyroid gland    • Fibromyalgia    • Fibromyalgia, primary    • Fractures 2006   • GERD (gastroesophageal reflux disease)    • History of cholecystectomy 09/07/2019   • Hyperlipidemia    • Irregular heart beat     can be tachy; also has orthoststic hypotension   • Lazy eye     resolved: 3/27/17   • Medical clearance for psychiatric admission 07/13/2021   • Melanoma ([address] ) 2010   • Osteoarthritis 07/2018   • Osteopenia     with joint pain-elevated DEV   • Psychiatric disorder    • Shortness of breath     assoc with tachycardia   • Stomach disorder 1995   • Tinnitus    • Wears glasses     for reading     Past Surgical History:   Procedure Laterality Date   • ABDOMINAL SURGERY      lysis of adhesions x 2   • APPENDECTOMY     • CERVICAL FUSION      May 5,2021 and Jan 01,2020   • CHOLECYSTECTOMY      open   • COLONOSCOPY     • DILATION AND CURETTAGE OF UTERUS     • FOOT SURGERY  05/2017   • NECK SURGERY  01/2019 5/2021   • NEUROMA EXCISION Right 05/26/2017    Procedure: EXCISION MASS / FIBROMA FOOT;  Surgeon: Cj Vogt DPM;  Location: [address] [place] [address];  Service:    • PARS PLANA VITRECTOMY W/ REPAIR OF MACULAR HOLE  12/23/2020   • DC XCAPSL CTRC RMVL INSJ IO LENS PROSTH W/O ECP Left 12/06/2021    Procedure: EXTRACTION EXTRACAPSULAR CATARACT PHACO INTRAOCULAR LENS (IOL);   Surgeon: Bonnie Maldonado MD;  Location: [facility] MAIN OR;  Service: Ophthalmology   • RIGHT OOPHORECTOMY     • WISDOM TOOTH EXTRACTION      x4     Social History   Social History     Substance and Sexual Activity   Alcohol Use Not Currently     Social History     Substance and Sexual Activity   Drug Use No     Social History     Tobacco Use   Smoking Status Never   Smokeless Tobacco Never     Family History   Problem Relation Age of Onset   • Heart disease Mother    • Stroke Mother 58   • COPD Mother    • Arthritis Mother    • Hypertension Father    • Kidney disease Brother         kidney transplant   • Multiple sclerosis "Sister    • No Known Problems Maternal Aunt    • No Known Problems Maternal Uncle    • No Known Problems Paternal Aunt    • No Known Problems Paternal Uncle    • No Known Problems Maternal Grandmother    • No Known Problems Maternal Grandfather    • No Known Problems Paternal Grandmother    • No Known Problems Paternal Grandfather    • Dislocations Sister    • Neurological problems Sister    • Scoliosis Sister    • ADD / ADHD Neg Hx    • Anesthesia problems Neg Hx    • Cancer Neg Hx    • Clotting disorder Neg Hx    • Collagen disease Neg Hx    • Diabetes Neg Hx    • Dislocations Neg Hx    • Learning disabilities Neg Hx    • Neurological problems Neg Hx    • Osteoporosis Neg Hx    • Rheumatologic disease Neg Hx    • Scoliosis Neg Hx    • Vascular Disease Neg Hx        Meds/Allergies       Current Outpatient Medications:   •  acetaminophen (TYLENOL) 650 mg CR tablet  •  albuterol (ProAir HFA) 90 mcg/act inhaler  •  atorvastatin (LIPITOR) 20 mg tablet  •  budesonide (Pulmicort Flexhaler) 90 MCG/ACT inhaler  •  Calcium Carb-Cholecalciferol 600-12 5 MG-MCG CAPS  •  [employer] Aspirin Adult Low Strength 81 MG EC tablet  •  DULoxetine (CYMBALTA) 60 mg delayed release capsule  •  esomeprazole (NexIUM) 40 MG capsule  •  gabapentin (NEURONTIN) 300 mg capsule  •  levothyroxine 50 mcg tablet  •  Magnesium 400 MG TABS  •  meloxicam (MOBIC) 7 5 mg tablet  •  midodrine (PROAMATINE) 10 MG tablet  •  predniSONE 10 mg tablet  •  QUEtiapine (SEROquel) 100 mg tablet  •  Sodium Fluoride 5000 PPM 1 1 % PSTE  •  sucralfate (CARAFATE) 1 g tablet  •  calcium carbonate (OS-WILLY) 1250 (500 Ca) MG tablet    Allergies   Allergen Reactions   • Meperidine Lightheadedness and Other (See Comments)   • Sulfa Antibiotics Hives           Objective     Blood pressure 120/85, pulse 91, height 5' 5\" (1 651 m), weight 74 4 kg (164 lb)  Body mass index is 27 29 kg/m²        PHYSICAL EXAM:      Physical Exam     Lab Results:   No visits with results within 1 Day(s) " from this visit  Latest known visit with results is:   No results displayed because visit has over 200 results  Radiology Results:   EGD    Result Date: 5/5/2023  Narrative: Table formatting from the original result was not included  [facility] Operating Room [address] [address] [phone] DATE OF SERVICE: 5/05/23 PHYSICIAN(S): Attending: Kim Zapata MD Fellow: No Staff Documented INDICATION: Abdominal pain POST-OP DIAGNOSIS: See the impression below  PREPROCEDURE: Informed consent was obtained for the procedure, including sedation  Risks of perforation, hemorrhage, adverse drug reaction and aspiration were discussed  The patient was placed in the left lateral decubitus position  Patient was explained about the risks and benefits of the procedure  Risks including but not limited to bleeding, infection, and perforation were explained in detail  Also explained about less than 100% sensitivity with the exam and other alternatives  PROCEDURE: EGD DETAILS OF PROCEDURE: Patient was taken to the procedure room where a time out was performed to confirm correct patient and correct procedure  The patient underwent monitored anesthesia care, which was administered by an anesthesia professional  The patient's blood pressure, heart rate, level of consciousness, respirations and oxygen were monitored throughout the procedure  The scope was advanced to the second part of the duodenum  Retroflexion was performed in the fundus  The patient's estimated blood loss was minimal (<5 mL)  The procedure was not difficult  The patient tolerated the procedure well  There were no apparent complications   ANESTHESIA INFORMATION: ASA: II Anesthesia Type: IV Sedation with Anesthesia MEDICATIONS: No administrations occurring from 1351 to 1453 on 05/05/23 FINDINGS: 2 cm hiatal hernia Performed forceps biopsies to rule out H  pylori in the stomach Performed forceps biopsies to rule out celiac disease in the duodenum Single small diverticulum in the 2nd part of the duodenum SPECIMENS: ID Type Source Tests Collected by Time Destination 1 : Duodenum r/o celiac Tissue Duodenum TISSUE EXAM Hiwot Ribera MD 5/5/2023  2:05 PM  2 : Antrum r/o h pylori Tissue Stomach TISSUE EXAM Hiwot Ribera MD 5/5/2023  2:05 PM  3 : R/O crohns Tissue Terminal Ileum TISSUE EXAM Hiwot Ribera MD 5/5/2023  2:42 PM      Impression: Small hiatal hernia Very slight antral erythema  Biopsies obtained Diminutive duodenal diverticulum Duodenal biopsies obtained RECOMMENDATION:  Await pathology results   Hiwot Ribera MD     Colonoscopy    Addendum Date: 5/5/2023 Addendum:   [address] [place] [address] Operating Room [address] [facility] [address] [phone] DATE OF SERVICE: 5/05/23 PHYSICIAN(S): Attending: Hiwot Ribera MD Fellow: No Staff Documented INDICATION: Diarrhea POST-OP DIAGNOSIS: See the impression below  HISTORY: Prior colonoscopy: Less than 3 years ago  It is being repeated at an interval of less than 3 years because: This colonoscopy is being performed for a diagnostic indication BOWEL PREPARATION: Golytely/Colyte/Trilyte PREPROCEDURE: Informed consent was obtained for the procedure, including sedation  Risks including but not limited to bleeding, infection, perforation, adverse drug reaction and aspiration were explained in detail  Also explained about less than 100% sensitivity with the exam and other alternatives  The patient was placed in the left lateral decubitus position  Procedure: Colonoscopy DETAILS OF PROCEDURE: Patient was taken to the procedure room where a time out was performed to confirm correct patient and correct procedure  The patient underwent monitored anesthesia care, which was administered by an anesthesia professional  The patient's blood pressure, heart rate, level of consciousness, oxygen and respirations were monitored throughout the procedure   A digital rectal exam was performed  The scope was introduced through the anus and advanced to the terminal ileum  Retroflexion was performed in the rectum  The quality of bowel preparation was evaluated using the [facility] Bowel Preparation Scale with scores of: right colon = 2, transverse colon = 1, left colon = 1  The total BBPS score was 4  Bowel prep was not adequate  The patient's estimated blood loss was minimal (<5 mL)  The procedure was difficult due to loops in the digestive tract  In response to procedure difficulty, counter pressure was applied  The patient tolerated the procedure well  There were no apparent complications  ANESTHESIA INFORMATION: ASA: II Anesthesia Type: IV Sedation with Anesthesia MEDICATIONS: No administrations occurring from 1351 to 1453 on 05/05/23 FINDINGS: Moderate erythematous, friable and ulcerated mucosa in the terminal ileum and ileocecal valve; performed cold forceps biopsy  There was a firm, fibrous ulceration on the ileocecal valve with what appeared to be a possible nonbleeding visible vessel  Hemostasis clip placed for bleeding prophylaxis EVENTS: Procedure Events Event Event Time ENDO CECUM REACHED 5/5/2023  2:39 PM ENDO SCOPE OUT TIME 5/5/2023  2:52 PM SPECIMENS: ID Type Source Tests Collected by Time Destination 1 : Duodenum r/o celiac Tissue Duodenum TISSUE EXAM Marilyn Lombardo MD 5/5/2023  2:05 PM  2 : Antrum r/o h pylori Tissue Stomach TISSUE EXAM Marilyn Lombardo MD 5/5/2023  2:05 PM  3 : R/O crohns Tissue Terminal Ileum TISSUE EXAM Marilyn Lombardo MD 5/5/2023  2:42 PM  EQUIPMENT: Colonoscope - IMPRESSION: Moderate inflammation and ulceration in the terminal ileum and ileocecal valve suggestive of Crohn's disease  Biopsies obtained Possible nonbleeding visible vessel on ileocecal valve ulcer clipped   RECOMMENDATION:  Await pathology results Give 1 dose of IV Solu-Medrol tonight and transition to oral prednisone tomorrow morning and continue until follows up outpatient Anticipate discharge home tomorrow Low residue diet Follow-up with Dr Evgeny Albert as outpatient   Sunny Galdamez MD     Addendum Date: 5/5/2023 Addendum:   [place] [address] [phone] DATE OF SERVICE: 5/05/23 PHYSICIAN(S): Attending: Sunny Galdamez MD Fellow: No Staff Documented INDICATION: Diarrhea POST-OP DIAGNOSIS: See the impression below  HISTORY: Prior colonoscopy: Less than 3 years ago  It is being repeated at an interval of less than 3 years because: This colonoscopy is being performed for a diagnostic indication BOWEL PREPARATION: Golytely/Colyte/Trilyte PREPROCEDURE: Informed consent was obtained for the procedure, including sedation  Risks including but not limited to bleeding, infection, perforation, adverse drug reaction and aspiration were explained in detail  Also explained about less than 100% sensitivity with the exam and other alternatives  The patient was placed in the left lateral decubitus position  Procedure: Colonoscopy DETAILS OF PROCEDURE: Patient was taken to the procedure room where a time out was performed to confirm correct patient and correct procedure  The patient underwent monitored anesthesia care, which was administered by an anesthesia professional  The patient's blood pressure, heart rate, level of consciousness, oxygen and respirations were monitored throughout the procedure  A digital rectal exam was performed  The scope was introduced through the anus and advanced to the terminal ileum  Retroflexion was performed in the rectum  The quality of bowel preparation was evaluated using the [facility] Bowel Preparation Scale with scores of: right colon = 2, transverse colon = 1, left colon = 1  The total BBPS score was 4  Bowel prep was not adequate  The patient's estimated blood loss was minimal (<5 mL)  The procedure was difficult due to loops in the digestive tract  In response to procedure difficulty, counter pressure was applied   The patient tolerated the procedure well  There were no apparent complications  ANESTHESIA INFORMATION: ASA: II Anesthesia Type: IV Sedation with Anesthesia MEDICATIONS: No administrations occurring from 1351 to 1453 on 05/05/23 FINDINGS: Moderate erythematous, friable and ulcerated mucosa in the terminal ileum and ileocecal valve; performed cold forceps biopsy  There was a firm, fibrous ulceration on the ileocecal valve with what appeared to be a possible nonbleeding visible vessel  Hemostasis clip placed for bleeding prophylaxis EVENTS: Procedure Events Event Event Time ENDO CECUM REACHED 5/5/2023  2:39 PM ENDO SCOPE OUT TIME 5/5/2023  2:52 PM SPECIMENS: ID Type Source Tests Collected by Time Destination 1 : Duodenum r/o celiac Tissue Duodenum TISSUE EXAM Kim Zapata MD 5/5/2023  2:05 PM  2 : Antrum r/o h pylori Tissue Stomach TISSUE EXAM Kim Zapata MD 5/5/2023  2:05 PM  3 : R/O crohns Tissue Terminal Ileum TISSUE EXAM Kim Zapata MD 5/5/2023  2:42 PM  EQUIPMENT: Colonoscope - IMPRESSION: Moderate inflammation and ulceration in the terminal ileum and ileocecal valve suggestive of Crohn's disease  Biopsies obtained Possible nonbleeding visible vessel on ileocecal valve ulcer clipped  RECOMMENDATION:  Await pathology results Give 1 dose of IV Solu-Medrol tonight and transition to oral prednisone tomorrow morning and continue until follows up outpatient Anticipate discharge home tomorrow Low residue diet May discontinue antibiotics on discharge Follow-up with Dr James Donohue as outpatient   Kim Zapata MD     Result Date: 5/5/2023  Narrative: Table formatting from the original result was not included  [address] [place] [address] Operating Room [place] [address] [phone] DATE OF SERVICE: 5/05/23 PHYSICIAN(S): Attending: Kim Zapata MD Fellow: No Staff Documented INDICATION: Diarrhea POST-OP DIAGNOSIS: See the impression below  HISTORY: Prior colonoscopy: Less than 3 years ago   It is being repeated at an interval of less than 3 years because: This colonoscopy is being performed for a diagnostic indication BOWEL PREPARATION: Golytely/Colyte/Trilyte PREPROCEDURE: Informed consent was obtained for the procedure, including sedation  Risks including but not limited to bleeding, infection, perforation, adverse drug reaction and aspiration were explained in detail  Also explained about less than 100% sensitivity with the exam and other alternatives  The patient was placed in the left lateral decubitus position  Procedure: Colonoscopy DETAILS OF PROCEDURE: Patient was taken to the procedure room where a time out was performed to confirm correct patient and correct procedure  The patient underwent monitored anesthesia care, which was administered by an anesthesia professional  The patient's blood pressure, heart rate, level of consciousness, oxygen and respirations were monitored throughout the procedure  A digital rectal exam was performed  The scope was introduced through the anus and advanced to the terminal ileum  Retroflexion was performed in the rectum  The quality of bowel preparation was evaluated using the [facility] Bowel Preparation Scale with scores of: right colon = 2, transverse colon = 1, left colon = 1  The total BBPS score was 4  Bowel prep was not adequate  The patient's estimated blood loss was minimal (<5 mL)  The procedure was difficult due to loops in the digestive tract  In response to procedure difficulty, counter pressure was applied  The patient tolerated the procedure well  There were no apparent complications  ANESTHESIA INFORMATION: ASA: II Anesthesia Type: IV Sedation with Anesthesia MEDICATIONS: No administrations occurring from 1351 to 1453 on 05/05/23 FINDINGS: Moderate erythematous, friable and ulcerated mucosa in the terminal ileum and ileocecal valve; performed cold forceps biopsy   There was a firm, fibrous ulceration on the ileocecal valve with what appeared to be a possible nonbleeding visible vessel  Hemostasis clip placed for bleeding prophylaxis EVENTS: Procedure Events Event Event Time ENDO CECUM REACHED 5/5/2023  2:39 PM ENDO SCOPE OUT TIME 5/5/2023  2:52 PM SPECIMENS: ID Type Source Tests Collected by Time Destination 1 : Duodenum r/o celiac Tissue Duodenum TISSUE EXAM Joe Zamudio MD 5/5/2023  2:05 PM  2 : Antrum r/o h pylori Tissue Stomach TISSUE EXAM Joe Zamudio MD 5/5/2023  2:05 PM  3 : R/O crohns Tissue Terminal Ileum TISSUE EXAM Joe Zamudio MD 5/5/2023  2:42 PM  EQUIPMENT: Colonoscope -     Impression: Moderate inflammation and ulceration in the terminal ileum and ileocecal valve suggestive of Crohn's disease  Biopsies obtained Possible nonbleeding visible vessel on ileocecal valve ulcer clipped  RECOMMENDATION:  Await pathology results Give 1 dose of IV Solu-Medrol tonight and transition to oral prednisone tomorrow morning with anticipation of discharge home tomorrow Low residue diet May discontinue antibiotics on discharge Follow-up with Dr Nick Brunner as outpatient   Joe Zamudio MD     CT head without contrast    Result Date: 5/1/2023  Narrative: CT BRAIN - WITHOUT CONTRAST INDICATION:   syncope  COMPARISON: 11/24/2022 TECHNIQUE:  CT examination of the brain was performed  Multiplanar 2D reformatted images were created from the source data  Radiation dose length product (DLP) for this visit:  471 mGy-cm   This examination, like all CT scans performed in the [facility], was performed utilizing techniques to minimize radiation dose exposure, including the use of iterative reconstruction and automated exposure control  IMAGE QUALITY:  Diagnostic  FINDINGS: PARENCHYMA:  No intracranial mass, mass effect or midline shift  No CT signs of acute infarction  No acute parenchymal hemorrhage  VENTRICLES AND EXTRA-AXIAL SPACES:  Normal for the patient's age  VISUALIZED ORBITS: Normal visualized orbits   PARANASAL SINUSES: Left maxillary retention cyst, unchanged  Sinuses and mastoid air cells are otherwise clear  CALVARIUM AND EXTRACRANIAL SOFT TISSUES:  Normal      Impression: No acute intracranial abnormality  Workstation performed: [id number]     MRI knee right  wo contrast    Result Date: 5/22/2023  Narrative: MRI RIGHT KNEE INDICATION:   M17 11: Unilateral primary osteoarthritis, right knee M25 561: Pain in right knee G89 29: Other chronic pain M23 91: Unspecified internal derangement of right knee  COMPARISON: X-ray right knee dated November 5, 2021 TECHNIQUE: Multiplanar/multisequence MR of the right knee was performed  FINDINGS: SUBCUTANEOUS TISSUES: Normal JOINT EFFUSION: There is a small joint effusion  BAKER'S CYST: There is a small Baker's cyst  MENISCI: Complex degenerative tear of body and anterior horn of lateral meniscus (series 7 images 15-19, series 8 images 10-14)  Medial meniscus is intact  CRUCIATE LIGAMENTS: Intact  EXTENSOR APPARATUS: Intact  COLLATERAL LIGAMENTS: Intact  ARTICULAR SURFACES: Medial compartment: Mild cartilage irregularity with small marginal osteophytes  Lateral compartment: Moderate osteoarthritis evidenced by high-grade cartilage loss in the lateral femoral condyle and lateral tibial plateau  There is probable linear subchondral insufficiency fracture of the lateral femoral condyle with associated moderate bone marrow edema (7/18, 8/16)  Patellofemoral compartment: Mild osteoarthritis  BONES: Probable linear subchondral insufficiency fracture of lateral femoral condyle with associated moderate bone marrow edema  MUSCULATURE:  Intact  Impression: 1  Complex degenerative tear of body and anterior horn of lateral meniscus  2  Probable linear subchondral insufficiency fracture of lateral femoral condyle with associated moderate bone marrow edema  3  Tricompartmental osteoarthritis, moderate in the lateral tibiofemoral compartment with high-grade cartilage loss   4  Small joint effusion and small Baker's cyst  Workstation performed: [id number]     CT abdomen pelvis with contrast    Result Date: 5/1/2023  Narrative: CT ABDOMEN AND PELVIS WITH IV CONTRAST INDICATION:   diarrhea  COMPARISON: Multiple prior examinations, most recently February 8, 2023  TECHNIQUE:  CT examination of the abdomen and pelvis was performed  Multiplanar 2D reformatted images were created from the source data  Radiation dose length product (DLP) for this visit:  823 mGy-cm   This examination, like all CT scans performed in the [facility], was performed utilizing techniques to minimize radiation dose exposure, including the use of iterative reconstruction and automated exposure control  IV Contrast:  100 mL of iohexol (OMNIPAQUE) Enteric Contrast:  Enteric contrast was not administered  FINDINGS: ABDOMEN LOWER CHEST:  No clinically significant abnormality identified in the visualized lower chest  LIVER/BILIARY TREE: Mild prominence of common bile duct and central intrahepatic biliary tree most consistent with the sequela of prior cholecystectomy  Liver is otherwise unremarkable  GALLBLADDER:  Gallbladder is surgically absent  SPLEEN:  Unremarkable  PANCREAS:  Unremarkable  ADRENAL GLANDS:  Unremarkable  KIDNEYS/URETERS: Accessory renal artery and vein noted at lower pole of right and left kidney  Symmetric bilateral renal enhancement  No urinary tract calculus or hydronephrosis  No solid renal mass  STOMACH AND BOWEL: Mild mucosal enhancement and suggestion of submucosal bowel wall thickening involving the terminal ileum as well as segments of ascending colon and questionably involving sigmoid colon  Duodenal and proximal jejunal small bowel diverticulosis noted  Also noted are descending colon diverticula  No small or large bowel diverticulitis    APPENDIX:  There are expected postoperative changes of appendectomy  ABDOMINOPELVIC CAVITY:  No ascites  No pneumoperitoneum  Borderline right lower quadrant mesenteric nodes   No "significant lymphadenopathy  VESSELS:  Unremarkable for patient's age  PELVIS REPRODUCTIVE ORGANS:  Unremarkable for patient's age  URINARY BLADDER:  Unremarkable  ABDOMINAL WALL/INGUINAL REGIONS:  Unremarkable  OSSEOUS STRUCTURES:  No acute fracture or destructive osseous lesion  Degenerative change at L3-L4 and L4-L5  Impression: Mild mucosal enhancement and submucosal wall thickening most notably involving the terminal ileum  Diagnostic considerations include infectious enteritis and mild changes of active inflammatory bowel disease  Small and large bowel diverticulosis with no evidence for acute diverticulitis  No abdominopelvic abscess  This examination was marked \"immediate notification\" in Epic in order to begin the standard process by which the radiology reading room liaison alerts the referring practitioner   Workstation performed: [id number]   "

## 2023-05-24 ENCOUNTER — TELEPHONE (OUTPATIENT)
Dept: GASTROENTEROLOGY | Facility: CLINIC | Age: 68
End: 2023-05-24

## 2023-05-24 ENCOUNTER — OFFICE VISIT (OUTPATIENT)
Dept: OBGYN CLINIC | Facility: CLINIC | Age: 68
End: 2023-05-24

## 2023-05-24 VITALS
DIASTOLIC BLOOD PRESSURE: 80 MMHG | HEART RATE: 100 BPM | SYSTOLIC BLOOD PRESSURE: 124 MMHG | BODY MASS INDEX: 27.32 KG/M2 | HEIGHT: 65 IN | WEIGHT: 164 LBS

## 2023-05-24 DIAGNOSIS — M25.561 CHRONIC PAIN OF RIGHT KNEE: ICD-10-CM

## 2023-05-24 DIAGNOSIS — S83.281A TEAR OF LATERAL MENISCUS OF RIGHT KNEE, UNSPECIFIED TEAR TYPE, UNSPECIFIED WHETHER OLD OR CURRENT TEAR, INITIAL ENCOUNTER: ICD-10-CM

## 2023-05-24 DIAGNOSIS — G89.29 CHRONIC PAIN OF RIGHT KNEE: ICD-10-CM

## 2023-05-24 DIAGNOSIS — M17.11 PRIMARY OSTEOARTHRITIS OF RIGHT KNEE: ICD-10-CM

## 2023-05-24 DIAGNOSIS — M84.451A SUBCHONDRAL INSUFFICIENCY FRACTURE OF CONDYLE OF RIGHT FEMUR, INITIAL ENCOUNTER (HCC): Primary | ICD-10-CM

## 2023-05-24 NOTE — TELEPHONE ENCOUNTER
----- Message from Ari Richard MD sent at 5/23/2023  4:59 PM EDT -----  Regarding: Infliximab  Patient with new diagnosis of Crohn's disease  Would like to initiate infliximab infusions

## 2023-05-24 NOTE — PROGRESS NOTES
Assessment/Plan:  1  Subchondral insufficiency fracture of condyle of right femur, initial encounter ([facility] [address] )  Walker      2  Primary osteoarthritis of right knee        3  Tear of lateral meniscus of right knee, unspecified tear type, unspecified whether old or current tear, initial encounter        4  Chronic pain of right knee          Scribe Attestation    I,:  Polly Aviles MA am acting as a scribe while in the presence of the attending physician :       I,:  Cory Small, DO personally performed the services described in this documentation    as scribed in my presence :         52-year-old female who presents to the office today for follow up evaluation of right knee pain and go review right knee MRI  The MRI was reviewed in the office today which demonstrates subchondral insufficiency fracture lateral femoral condyle, lateral mensicus tear and tricompartmental osteoarthritis with high-grade cartilage loss  The patient was instructed to continue with the OTC knee brace she currently has  She was provided with a walker and was advised to remain non weightbearing with her RLE  We discussed treatment options for the mensicus tear and osteoarthritis in the future once we allow the insufficiency fracture to heal  We can consider repeat steroid injections  She will follow up in 6 weeks for repeat evaluation  Subjective: right knee follow up    Patient ID: Elías Odell is a 78 y o  female who presents to the office today for follow up evaluation right knee pain  A MRI was ordered at her last visit  The patient presents to the office today to review the MRI  She states her right knee pain is unchanged  She has been using a OTC knee brace  Review of Systems   Constitutional: Positive for activity change  Negative for chills and fever  HENT: Negative for drooling and sneezing  Eyes: Negative for redness  Respiratory: Negative for cough and wheezing      Gastrointestinal: Negative for nausea and vomiting  Musculoskeletal: Positive for arthralgias  Negative for joint swelling and myalgias  Neurological: Negative for weakness and numbness  Psychiatric/Behavioral: Negative for behavioral problems  The patient is not nervous/anxious  Past Medical History:   Diagnosis Date   • Abdominal adhesions    • Anesthesia complication     difficult to wake up   • Anxiety    • Arthritis    • Cancer (HCC)     melanoma   • Colon polyp    • Depression    • Depression    • Disease of thyroid gland    • Fibromyalgia    • Fibromyalgia, primary    • Fractures 2006   • GERD (gastroesophageal reflux disease)    • History of cholecystectomy 09/07/2019   • Hyperlipidemia    • Irregular heart beat     can be tachy; also has orthoststic hypotension   • Lazy eye     resolved: 3/27/17   • Medical clearance for psychiatric admission 07/13/2021   • Melanoma ([facility] [address] ) 2010   • Osteoarthritis 07/2018   • Osteopenia     with joint pain-elevated DEV   • Psychiatric disorder    • Shortness of breath     assoc with tachycardia   • Stomach disorder 1995   • Tinnitus    • Wears glasses     for reading       Past Surgical History:   Procedure Laterality Date   • ABDOMINAL SURGERY      lysis of adhesions x 2   • APPENDECTOMY     • CERVICAL FUSION      May 5,2021 and Jan 01,2020   • CHOLECYSTECTOMY      open   • COLONOSCOPY     • DILATION AND CURETTAGE OF UTERUS     • FOOT SURGERY  05/2017   • NECK SURGERY  01/2019 5/2021   • NEUROMA EXCISION Right 05/26/2017    Procedure: EXCISION MASS / FIBROMA FOOT;  Surgeon: Ramona Jamil DPM;  Location: [address] [place] [address];  Service:    • PARS PLANA VITRECTOMY W/ REPAIR OF MACULAR HOLE  12/23/2020   • NH XCAPSL CTRC RMVL INSJ IO LENS PROSTH W/O ECP Left 12/06/2021    Procedure: EXTRACTION EXTRACAPSULAR CATARACT PHACO INTRAOCULAR LENS (IOL);   Surgeon: Jessica Brown MD;  Location: [facility] MAIN OR;  Service: Ophthalmology   • RIGHT OOPHORECTOMY     • WISDOM TOOTH EXTRACTION      x4       Family History Problem Relation Age of Onset   • Heart disease Mother    • Stroke Mother 58   • COPD Mother    • Arthritis Mother    • Hypertension Father    • Kidney disease Brother         kidney transplant   • Multiple sclerosis Sister    • No Known Problems Maternal Aunt    • No Known Problems Maternal Uncle    • No Known Problems Paternal Aunt    • No Known Problems Paternal Uncle    • No Known Problems Maternal Grandmother    • No Known Problems Maternal Grandfather    • No Known Problems Paternal Grandmother    • No Known Problems Paternal Grandfather    • Dislocations Sister    • Neurological problems Sister    • Scoliosis Sister    • ADD / ADHD Neg Hx    • Anesthesia problems Neg Hx    • Cancer Neg Hx    • Clotting disorder Neg Hx    • Collagen disease Neg Hx    • Diabetes Neg Hx    • Dislocations Neg Hx    • Learning disabilities Neg Hx    • Neurological problems Neg Hx    • Osteoporosis Neg Hx    • Rheumatologic disease Neg Hx    • Scoliosis Neg Hx    • Vascular Disease Neg Hx        Social History     Occupational History   • Not on file   Tobacco Use   • Smoking status: Never   • Smokeless tobacco: Never   Vaping Use   • Vaping Use: Never used   Substance and Sexual Activity   • Alcohol use: Not Currently   • Drug use: No   • Sexual activity: Not Currently     Partners: Male         Current Outpatient Medications:   •  acetaminophen (TYLENOL) 650 mg CR tablet, Take 1 tablet (650 mg total) by mouth every 8 (eight) hours as needed for headaches, moderate pain or mild pain, Disp: , Rfl: 0  •  albuterol (ProAir HFA) 90 mcg/act inhaler, Inhale 2 puffs every 6 (six) hours as needed for wheezing, Disp: 8 5 g, Rfl: 0  •  atorvastatin (LIPITOR) 20 mg tablet, TAKE 1 TABLET BY MOUTH EVERY DAY, Disp: 90 tablet, Rfl: 2  •  budesonide (Pulmicort Flexhaler) 90 MCG/ACT inhaler, Inhale 1 puff 2 (two) times a day, Disp: 1 each, Rfl: 5  •  Calcium Carb-Cholecalciferol 600-12 5 MG-MCG CAPS, Take by mouth daily in the early morning, Disp: , Rfl:   •  calcium carbonate (OS-WILLY) 1250 (500 Ca) MG tablet, Take 1,250 mg by mouth (Patient not taking: Reported on 5/8/2023), Disp: , Rfl:   •  [employer] Aspirin Adult Low Strength 81 MG EC tablet, CHEW AND SWALLOW 1 TABLET BY MOUTH ONCE DAILY, Disp: , Rfl:   •  DULoxetine (CYMBALTA) 60 mg delayed release capsule, Take 1 capsule (60 mg total) by mouth daily, Disp: 90 capsule, Rfl: 1  •  esomeprazole (NexIUM) 40 MG capsule, Take 1 capsule (40 mg total) by mouth daily before breakfast, Disp: 90 capsule, Rfl: 5  •  gabapentin (NEURONTIN) 300 mg capsule, Take 1 capsule (300 mg total) by mouth 3 (three) times a day, Disp: 90 capsule, Rfl: 5  •  levothyroxine 50 mcg tablet, TAKE 1 TABLET BY MOUTH EVERY DAY, Disp: 90 tablet, Rfl: 0  •  Magnesium 400 MG TABS, Take by mouth daily, Disp: , Rfl:   •  meloxicam (MOBIC) 7 5 mg tablet, Take 1 tablet (7 5 mg total) by mouth daily, Disp: 30 tablet, Rfl: 1  •  midodrine (PROAMATINE) 10 MG tablet, Take 1 tablet (10 mg total) by mouth 2 (two) times a day, Disp: , Rfl:   •  predniSONE 10 mg tablet, Take 3 tablets (30 mg total) by mouth daily 3 pills daily for 1 week, then 2 pills daily for 1 week, then 1 pill daily for 1 week, Disp: 120 tablet, Rfl: 1  •  QUEtiapine (SEROquel) 100 mg tablet, Take 1 tablet (100 mg total) by mouth daily at bedtime, Disp: 90 tablet, Rfl: 1  •  Sodium Fluoride 5000 PPM 1 1 % PSTE, USE ONCE DAILY PREFERABLY AT NIGHT, Disp: , Rfl:   •  sucralfate (CARAFATE) 1 g tablet, Take 1 tablet (1 g total) by mouth 4 (four) times a day, Disp: 120 tablet, Rfl: 0    Allergies   Allergen Reactions   • Meperidine Lightheadedness and Other (See Comments)   • Sulfa Antibiotics Hives       Objective:  Vitals:    05/24/23 1645   BP: 124/80   Pulse: 100       Body mass index is 27 29 kg/m²  Right Knee Exam     Tenderness   The patient is experiencing tenderness in the lateral joint line (Lateral femoral condyle with knee in flexion)      Range of Motion   Extension: 0 Flexion: 120     Tests   Lexa:  Medial - positive Lateral - negative  Varus: negative Valgus: negative  Drawer:  Anterior - negative    Posterior - negative    Other   Erythema: absent  Scars: absent  Sensation: normal  Pulse: present  Swelling: none  Effusion: effusion (scant) present    Comments:  TTP lateral femoral condyle           Observations     Right Knee   Positive for effusion (scant)  Physical Exam  Vitals and nursing note reviewed  Constitutional:       Appearance: Normal appearance  She is well-developed  HENT:      Head: Normocephalic and atraumatic  Nose: Nose normal    Eyes:      General:         Right eye: No discharge  Left eye: No discharge  Extraocular Movements: Extraocular movements intact  Conjunctiva/sclera: Conjunctivae normal    Cardiovascular:      Rate and Rhythm: Normal rate  Pulmonary:      Effort: Pulmonary effort is normal  No respiratory distress  Musculoskeletal:      Cervical back: Normal range of motion and neck supple  Right knee: Effusion (scant) present  Instability Tests: Medial Lexa test positive  Lateral Lexa test negative  Skin:     General: Skin is warm and dry  Neurological:      Mental Status: She is alert and oriented to person, place, and time  Psychiatric:         Behavior: Behavior normal          Thought Content: Thought content normal          Judgment: Judgment normal          I have personally reviewed pertinent films in PACS  MRI right knee performed on 5/18/23 demonstrates  demonstrates subchondral insufficiency fracture lateral femoral condyle, lateral mensicus tear and tricompartmental osteoarthritis with high-grade cartilage loss  Small bakers cyst      This document was created using speech voice recognition software     Grammatical errors, random word insertions, pronoun errors, and incomplete sentences are an occasional consequence of this system due to software limitations, ambient noise, and hardware issues  Any formal questions or concerns about content, text, or information contained within the body of this dictation should be directly addressed to the provider for clarification

## 2023-05-24 NOTE — TELEPHONE ENCOUNTER
From: Angella Wade MD  Sent: 5/23/2023   5:00 PM EDT  To: Valeriy Wilson Ibd  Subject: Infliximab                                       Patient with new diagnosis of Crohn's disease  Would like to initiate infliximab infusions

## 2023-05-25 NOTE — TELEPHONE ENCOUNTER
Patients GI provider:  Dr Fly Perez    Number to return call: [phone]    Reason for call: Pt returning phone call from [address]  Please contact patient      Scheduled procedure/appointment date if applicable: Apt/procedure

## 2023-05-25 NOTE — TELEPHONE ENCOUNTER
Connected with the patient's vm and left a message with c/b number  Angie: We can start working on the [facility] since the patient lives in [facility] and is current on the labs  Thank you!

## 2023-05-30 NOTE — NURSING NOTE
Pt given discharge instructions and prescriptions  Verbalized understanding  Pt given belonging and escorted to front door by [facility]  negative...

## 2023-05-30 NOTE — TELEPHONE ENCOUNTER
Called and left the patient a vm requesting to look into her myChart and to message or call back if she has any questions

## 2023-06-12 ENCOUNTER — HOSPITAL ENCOUNTER (OUTPATIENT)
Dept: RADIOLOGY | Facility: HOSPITAL | Age: 68
Discharge: HOME | End: 2023-06-12
Attending: INTERNAL MEDICINE
Payer: COMMERCIAL

## 2023-06-12 DIAGNOSIS — K50.80 CROHN'S DISEASE OF BOTH SMALL AND LARGE INTESTINE WITHOUT COMPLICATION (HCC): ICD-10-CM

## 2023-06-12 PROCEDURE — G1004 CDSM NDSC: HCPCS

## 2023-06-12 PROCEDURE — 74177 CT ABD & PELVIS W/CONTRAST: CPT

## 2023-06-12 RX ADMIN — IOHEXOL 100 ML: 350 INJECTION, SOLUTION INTRAVENOUS at 10:39

## 2023-06-15 ENCOUNTER — TELEPHONE (OUTPATIENT)
Dept: GASTROENTEROLOGY | Facility: CLINIC | Age: 68
End: 2023-06-15

## 2023-06-15 DIAGNOSIS — K86.9 PANCREATIC LESION: Primary | ICD-10-CM

## 2023-06-15 DIAGNOSIS — R91.8 ABNORMAL CT SCAN OF LUNG: ICD-10-CM

## 2023-06-15 NOTE — TELEPHONE ENCOUNTER
Spoke to patient regarding CT enterography findings-no findings of acute inflammatory bowel disease  She did have a 0 7 cm indeterminate pancreatic lesion previously 0 5 and 1 2cm cystic lesion in the anterior pancreatic head/neck requiring further imaging with MRI/MRCP and  A 0 3cm endobronchial nodular opacity in left lower lobe requiring repeat CT chest in 3 months  Phone # for central scheduling given

## 2023-06-26 ENCOUNTER — HOSPITAL ENCOUNTER (OUTPATIENT)
Dept: INFUSION CENTER | Facility: HOSPITAL | Age: 68
Discharge: HOME | End: 2023-06-26
Attending: INTERNAL MEDICINE
Payer: COMMERCIAL

## 2023-06-26 VITALS
DIASTOLIC BLOOD PRESSURE: 77 MMHG | TEMPERATURE: 97.3 F | RESPIRATION RATE: 18 BRPM | HEART RATE: 95 BPM | WEIGHT: 164.68 LBS | BODY MASS INDEX: 27.4 KG/M2 | SYSTOLIC BLOOD PRESSURE: 136 MMHG | OXYGEN SATURATION: 97 %

## 2023-06-26 DIAGNOSIS — K50.80 CROHN'S DISEASE OF BOTH SMALL AND LARGE INTESTINE WITHOUT COMPLICATION (HCC): Primary | ICD-10-CM

## 2023-06-26 RX ORDER — SODIUM CHLORIDE 9 MG/ML
20 INJECTION, SOLUTION INTRAVENOUS ONCE
Status: COMPLETED | OUTPATIENT
Start: 2023-06-26 | End: 2023-06-26

## 2023-06-26 RX ORDER — DIPHENHYDRAMINE HCL 25 MG
25 TABLET ORAL ONCE
Status: COMPLETED | OUTPATIENT
Start: 2023-06-26 | End: 2023-06-26

## 2023-06-26 RX ORDER — SODIUM CHLORIDE 9 MG/ML
20 INJECTION, SOLUTION INTRAVENOUS ONCE
OUTPATIENT
Start: 2023-07-10

## 2023-06-26 RX ORDER — METHYLPREDNISOLONE SODIUM SUCCINATE 40 MG/ML
40 INJECTION, POWDER, LYOPHILIZED, FOR SOLUTION INTRAMUSCULAR; INTRAVENOUS ONCE
OUTPATIENT
Start: 2023-07-10

## 2023-06-26 RX ORDER — ACETAMINOPHEN 325 MG/1
650 TABLET ORAL ONCE
OUTPATIENT
Start: 2023-07-10

## 2023-06-26 RX ORDER — ACETAMINOPHEN 325 MG/1
650 TABLET ORAL ONCE
Status: COMPLETED | OUTPATIENT
Start: 2023-06-26 | End: 2023-06-26

## 2023-06-26 RX ORDER — DIPHENHYDRAMINE HCL 25 MG
25 TABLET ORAL ONCE
OUTPATIENT
Start: 2023-07-10

## 2023-06-26 RX ORDER — METHYLPREDNISOLONE SODIUM SUCCINATE 40 MG/ML
40 INJECTION, POWDER, LYOPHILIZED, FOR SOLUTION INTRAMUSCULAR; INTRAVENOUS ONCE
Status: COMPLETED | OUTPATIENT
Start: 2023-06-26 | End: 2023-06-26

## 2023-06-26 RX ADMIN — ACETAMINOPHEN 650 MG: 325 TABLET ORAL at 09:02

## 2023-06-26 RX ADMIN — SODIUM CHLORIDE 20 ML/HR: 0.9 INJECTION, SOLUTION INTRAVENOUS at 09:01

## 2023-06-26 RX ADMIN — METHYLPREDNISOLONE SODIUM SUCCINATE 40 MG: 40 INJECTION, POWDER, FOR SOLUTION INTRAMUSCULAR; INTRAVENOUS at 09:02

## 2023-06-26 RX ADMIN — INFLIXIMAB 374 MG: 100 INJECTION, POWDER, LYOPHILIZED, FOR SOLUTION INTRAVENOUS at 09:58

## 2023-06-26 RX ADMIN — DIPHENHYDRAMINE HYDROCHLORIDE 25 MG: 25 TABLET ORAL at 09:02

## 2023-07-03 DIAGNOSIS — G89.29 CHRONIC PAIN OF RIGHT KNEE: ICD-10-CM

## 2023-07-03 DIAGNOSIS — M17.11 PRIMARY OSTEOARTHRITIS OF RIGHT KNEE: ICD-10-CM

## 2023-07-03 DIAGNOSIS — M25.561 CHRONIC PAIN OF RIGHT KNEE: ICD-10-CM

## 2023-07-03 RX ORDER — MELOXICAM 7.5 MG/1
TABLET ORAL
Qty: 30 TABLET | Refills: 1 | Status: SHIPPED | OUTPATIENT
Start: 2023-07-03

## 2023-07-10 ENCOUNTER — HOSPITAL ENCOUNTER (OUTPATIENT)
Dept: INFUSION CENTER | Facility: HOSPITAL | Age: 68
Discharge: HOME | End: 2023-07-10
Attending: INTERNAL MEDICINE
Payer: COMMERCIAL

## 2023-07-10 VITALS
DIASTOLIC BLOOD PRESSURE: 83 MMHG | HEART RATE: 84 BPM | OXYGEN SATURATION: 98 % | WEIGHT: 164.24 LBS | TEMPERATURE: 98.2 F | SYSTOLIC BLOOD PRESSURE: 139 MMHG | RESPIRATION RATE: 18 BRPM | BODY MASS INDEX: 27.33 KG/M2

## 2023-07-10 DIAGNOSIS — K50.80 CROHN'S DISEASE OF BOTH SMALL AND LARGE INTESTINE WITHOUT COMPLICATION (HCC): Primary | ICD-10-CM

## 2023-07-10 RX ORDER — ACETAMINOPHEN 325 MG/1
650 TABLET ORAL ONCE
Status: COMPLETED | OUTPATIENT
Start: 2023-07-10 | End: 2023-07-10

## 2023-07-10 RX ORDER — SODIUM CHLORIDE 9 MG/ML
20 INJECTION, SOLUTION INTRAVENOUS ONCE
Status: COMPLETED | OUTPATIENT
Start: 2023-07-10 | End: 2023-07-10

## 2023-07-10 RX ORDER — SODIUM CHLORIDE 9 MG/ML
20 INJECTION, SOLUTION INTRAVENOUS ONCE
Status: CANCELLED | OUTPATIENT
Start: 2023-08-07

## 2023-07-10 RX ORDER — DIPHENHYDRAMINE HCL 25 MG
25 TABLET ORAL ONCE
Status: CANCELLED | OUTPATIENT
Start: 2023-08-07

## 2023-07-10 RX ORDER — DIPHENHYDRAMINE HCL 25 MG
25 TABLET ORAL ONCE
OUTPATIENT
Start: 2023-08-07

## 2023-07-10 RX ORDER — ACETAMINOPHEN 325 MG/1
650 TABLET ORAL ONCE
OUTPATIENT
Start: 2023-08-07

## 2023-07-10 RX ORDER — DIPHENHYDRAMINE HCL 25 MG
25 TABLET ORAL ONCE
Status: COMPLETED | OUTPATIENT
Start: 2023-07-10 | End: 2023-07-10

## 2023-07-10 RX ORDER — METHYLPREDNISOLONE SODIUM SUCCINATE 40 MG/ML
40 INJECTION, POWDER, LYOPHILIZED, FOR SOLUTION INTRAMUSCULAR; INTRAVENOUS ONCE
OUTPATIENT
Start: 2023-08-07

## 2023-07-10 RX ORDER — METHYLPREDNISOLONE SODIUM SUCCINATE 40 MG/ML
40 INJECTION, POWDER, LYOPHILIZED, FOR SOLUTION INTRAMUSCULAR; INTRAVENOUS ONCE
Status: CANCELLED | OUTPATIENT
Start: 2023-08-07

## 2023-07-10 RX ORDER — ACETAMINOPHEN 325 MG/1
650 TABLET ORAL ONCE
Status: CANCELLED | OUTPATIENT
Start: 2023-08-07

## 2023-07-10 RX ORDER — METHYLPREDNISOLONE SODIUM SUCCINATE 40 MG/ML
40 INJECTION, POWDER, LYOPHILIZED, FOR SOLUTION INTRAMUSCULAR; INTRAVENOUS ONCE
Status: COMPLETED | OUTPATIENT
Start: 2023-07-10 | End: 2023-07-10

## 2023-07-10 RX ORDER — SODIUM CHLORIDE 9 MG/ML
20 INJECTION, SOLUTION INTRAVENOUS ONCE
OUTPATIENT
Start: 2023-08-07

## 2023-07-10 RX ADMIN — METHYLPREDNISOLONE SODIUM SUCCINATE 40 MG: 40 INJECTION, POWDER, FOR SOLUTION INTRAMUSCULAR; INTRAVENOUS at 09:16

## 2023-07-10 RX ADMIN — DIPHENHYDRAMINE HYDROCHLORIDE 25 MG: 25 TABLET ORAL at 09:16

## 2023-07-10 RX ADMIN — INFLIXIMAB 373 MG: 100 INJECTION, POWDER, LYOPHILIZED, FOR SOLUTION INTRAVENOUS at 10:00

## 2023-07-10 RX ADMIN — SODIUM CHLORIDE 20 ML/HR: 0.9 INJECTION, SOLUTION INTRAVENOUS at 09:15

## 2023-07-10 RX ADMIN — ACETAMINOPHEN 650 MG: 325 TABLET ORAL at 09:16

## 2023-07-12 ENCOUNTER — OFFICE VISIT (OUTPATIENT)
Dept: OBGYN CLINIC | Facility: CLINIC | Age: 68
End: 2023-07-12
Payer: COMMERCIAL

## 2023-07-12 VITALS
SYSTOLIC BLOOD PRESSURE: 119 MMHG | HEIGHT: 65 IN | HEART RATE: 98 BPM | DIASTOLIC BLOOD PRESSURE: 76 MMHG | BODY MASS INDEX: 27.86 KG/M2 | WEIGHT: 167.2 LBS

## 2023-07-12 DIAGNOSIS — M17.11 PRIMARY OSTEOARTHRITIS OF RIGHT KNEE: Primary | ICD-10-CM

## 2023-07-12 DIAGNOSIS — G89.29 CHRONIC PAIN OF RIGHT KNEE: ICD-10-CM

## 2023-07-12 DIAGNOSIS — S83.281A TEAR OF LATERAL MENISCUS OF RIGHT KNEE, UNSPECIFIED TEAR TYPE, UNSPECIFIED WHETHER OLD OR CURRENT TEAR, INITIAL ENCOUNTER: ICD-10-CM

## 2023-07-12 DIAGNOSIS — M25.561 CHRONIC PAIN OF RIGHT KNEE: ICD-10-CM

## 2023-07-12 DIAGNOSIS — M84.451A SUBCHONDRAL INSUFFICIENCY FRACTURE OF CONDYLE OF RIGHT FEMUR, INITIAL ENCOUNTER (HCC): ICD-10-CM

## 2023-07-12 PROCEDURE — 99213 OFFICE O/P EST LOW 20 MIN: CPT | Performed by: ORTHOPAEDIC SURGERY

## 2023-07-12 NOTE — PROGRESS NOTES
Assessment/Plan:  1. Primary osteoarthritis of right knee        2. Chronic pain of right knee        3. Subchondral insufficiency fracture of condyle of right femur, initial encounter ([address])        4. Tear of lateral meniscus of right knee, unspecified tear type, unspecified whether old or current tear, initial encounter          Jennifer Hernandez is a pleasant 20-year-old female presenting today for follow-up of her right knee. The pain that she was experiencing from the subchondral insufficiency fracture seems to have resolved. She does not have any mechanical symptoms and is unlikely to be symptomatic of the meniscus tear that was seen on the MRI. She has been able to ambulate and go up and down stairs without any significant difficulties. Her current residual achiness and soreness with small effusion is likely due to her underlying osteoarthritis. At this time, her symptoms are manageable with an occasional knee brace, meloxicam, and rest.  Therefore, we do not feel that she needs a repeat intra-articular cortisone injection at this time. We discussed that we may reinitiate this for her if and when the aforementioned modalities no longer provide significant relief. She may now follow-up on an as-needed basis. She expressed understanding all of her questions were addressed today    Subjective: right knee follow up    Patient ID: Keagan Cunningham is a 78 y.o. female presenting today for follow-up 6 weeks after her last appointment for her right knee. She was unable to maintain nonweightbearing, but did modified weightbearing of the right lower extremity with a cane. She has since transitioned from the cane to no assistive device. She is very pleased with her progress. She has an occasional soreness in the knee with 3 out of 10 pain and occasional swelling, but no significant limitations in her activities of daily living or enjoyment. She is still been utilizing the hinged knee brace occasionally and taking Mobic. She denies any new injuries. She denies any locking, buckling, or giving way    Review of Systems   Constitutional: Negative. HENT: Negative. Eyes: Negative. Respiratory: Negative. Cardiovascular: Negative. Gastrointestinal: Negative. Endocrine: Negative. Genitourinary: Negative. Musculoskeletal: Positive for arthralgias, joint swelling and myalgias. Skin: Negative. Allergic/Immunologic: Negative. Neurological: Negative. Hematological: Negative. Psychiatric/Behavioral: Negative.       Past Medical History:   Diagnosis Date   • Abdominal adhesions    • Anesthesia complication     difficult to wake up   • Anxiety    • Arthritis    • Cancer (HCC)     melanoma   • Colon polyp    • Depression    • Depression    • Disease of thyroid gland    • Fibromyalgia    • Fibromyalgia, primary    • Fractures 2006   • GERD (gastroesophageal reflux disease)    • History of cholecystectomy 09/07/2019   • Hyperlipidemia    • Irregular heart beat     can be tachy; also has orthoststic hypotension   • Lazy eye     resolved: 3/27/17   • Medical clearance for psychiatric admission 07/13/2021   • Melanoma ([address]) 2010   • Osteoarthritis 07/2018   • Osteopenia     with joint pain-elevated DEV   • Psychiatric disorder    • Shortness of breath     assoc with tachycardia   • Stomach disorder 1995   • Tinnitus    • Wears glasses     for reading       Past Surgical History:   Procedure Laterality Date   • ABDOMINAL SURGERY      lysis of adhesions x 2   • APPENDECTOMY     • CERVICAL FUSION      May 5,2021 and Jan. 01,2020   • CHOLECYSTECTOMY      open   • COLONOSCOPY     • DILATION AND CURETTAGE OF UTERUS     • FOOT SURGERY  05/2017   • NECK SURGERY  01/2019 5/2021   • NEUROMA EXCISION Right 05/26/2017    Procedure: EXCISION MASS / FIBROMA FOOT;  Surgeon: Tiara Velarde DPM;  Location: [facility];  Service:    • PARS PLANA VITRECTOMY W/ REPAIR OF MACULAR HOLE  12/23/2020   • DC XCAPSL CTRC RMVL INSJ IO LENS PROSTH W/O ECP Left 12/06/2021    Procedure: EXTRACTION EXTRACAPSULAR CATARACT PHACO INTRAOCULAR LENS (IOL);   Surgeon: Lucio Fenton MD;  Location: [facility] MAIN OR;  Service: Ophthalmology   • RIGHT OOPHORECTOMY     • WISDOM TOOTH EXTRACTION      x4       Family History   Problem Relation Age of Onset   • Heart disease Mother    • Stroke Mother 58   • COPD Mother    • Arthritis Mother    • Hypertension Father    • Kidney disease Brother         kidney transplant   • Multiple sclerosis Sister    • No Known Problems Maternal Aunt    • No Known Problems Maternal Uncle    • No Known Problems Paternal Aunt    • No Known Problems Paternal Uncle    • No Known Problems Maternal Grandmother    • No Known Problems Maternal Grandfather    • No Known Problems Paternal Grandmother    • No Known Problems Paternal Grandfather    • Dislocations Sister    • Neurological problems Sister    • Scoliosis Sister    • ADD / ADHD Neg Hx    • Anesthesia problems Neg Hx    • Cancer Neg Hx    • Clotting disorder Neg Hx    • Collagen disease Neg Hx    • Diabetes Neg Hx    • Dislocations Neg Hx    • Learning disabilities Neg Hx    • Neurological problems Neg Hx    • Osteoporosis Neg Hx    • Rheumatologic disease Neg Hx    • Scoliosis Neg Hx    • Vascular Disease Neg Hx        Social History     Occupational History   • Not on file   Tobacco Use   • Smoking status: Never   • Smokeless tobacco: Never   Vaping Use   • Vaping Use: Never used   Substance and Sexual Activity   • Alcohol use: Not Currently   • Drug use: No   • Sexual activity: Not Currently     Partners: Male         Current Outpatient Medications:   •  acetaminophen (TYLENOL) 650 mg CR tablet, Take 1 tablet (650 mg total) by mouth every 8 (eight) hours as needed for headaches, moderate pain or mild pain, Disp: , Rfl: 0  •  albuterol (ProAir HFA) 90 mcg/act inhaler, Inhale 2 puffs every 6 (six) hours as needed for wheezing, Disp: 8.5 g, Rfl: 0  •  atorvastatin (LIPITOR) 20 mg tablet, TAKE 1 TABLET BY MOUTH EVERY DAY, Disp: 90 tablet, Rfl: 1  •  budesonide (Pulmicort Flexhaler) 90 MCG/ACT inhaler, Inhale 1 puff 2 (two) times a day, Disp: 1 each, Rfl: 5  •  Calcium Carb-Cholecalciferol 600-12.5 MG-MCG CAPS, Take by mouth daily in the early morning, Disp: , Rfl:   •  [employer] Aspirin Adult Low Strength 81 MG EC tablet, CHEW AND SWALLOW 1 TABLET BY MOUTH ONCE DAILY, Disp: , Rfl:   •  DULoxetine (CYMBALTA) 60 mg delayed release capsule, Take 1 capsule (60 mg total) by mouth daily, Disp: 90 capsule, Rfl: 1  •  esomeprazole (NexIUM) 40 MG capsule, Take 1 capsule (40 mg total) by mouth daily before breakfast, Disp: 90 capsule, Rfl: 5  •  gabapentin (NEURONTIN) 300 mg capsule, Take 1 capsule (300 mg total) by mouth 3 (three) times a day, Disp: 90 capsule, Rfl: 5  •  levothyroxine 50 mcg tablet, TAKE 1 TABLET BY MOUTH EVERY DAY, Disp: 90 tablet, Rfl: 0  •  Magnesium 400 MG TABS, Take by mouth daily, Disp: , Rfl:   •  meloxicam (MOBIC) 7.5 mg tablet, TAKE 1 TABLET BY MOUTH EVERY DAY, Disp: 30 tablet, Rfl: 1  •  midodrine (PROAMATINE) 10 MG tablet, Take 1 tablet (10 mg total) by mouth 2 (two) times a day, Disp: , Rfl:   •  predniSONE 10 mg tablet, Take 3 tablets (30 mg total) by mouth daily 3 pills daily for 1 week, then 2 pills daily for 1 week, then 1 pill daily for 1 week, Disp: 120 tablet, Rfl: 1  •  QUEtiapine (SEROquel) 100 mg tablet, Take 1 tablet (100 mg total) by mouth daily at bedtime, Disp: 90 tablet, Rfl: 1  •  Sodium Fluoride 5000 PPM 1.1 % PSTE, USE ONCE DAILY PREFERABLY AT NIGHT, Disp: , Rfl:   •  sucralfate (CARAFATE) 1 g tablet, Take 1 tablet (1 g total) by mouth 4 (four) times a day, Disp: 120 tablet, Rfl: 0  •  calcium carbonate (OS-WILLY) 1250 (500 Ca) MG tablet, Take 1,250 mg by mouth (Patient not taking: Reported on 5/8/2023), Disp: , Rfl:   No current facility-administered medications for this visit.     Facility-Administered Medications Ordered in Other Visits:   •  alteplase (CATHFLO) injection 2 mg, 2 mg, Intracatheter, Q1MIN PRN, Alec Friedman MD    Allergies   Allergen Reactions   • Meperidine Lightheadedness and Other (See Comments)   • Sulfa Antibiotics Hives       Objective:  Vitals:    07/12/23 1724   BP: 119/76   Pulse: 98       Body mass index is 27.82 kg/m². Right Knee Exam     Tenderness   The patient is experiencing tenderness in the lateral joint line (Lateral femoral condyle with knee in flexion). Range of Motion   Extension:  0 normal   Flexion:  120 normal     Tests   Lexa:  Medial - positive Lateral - negative  Varus: negative Valgus: negative  Drawer:  Anterior - negative    Posterior - negative  Patellar apprehension: negative    Other   Erythema: absent  Scars: absent  Sensation: normal  Pulse: present  Swelling: none  Effusion: effusion (scant) present    Comments:  TTP lateral femoral condyle           Observations     Right Knee   Positive for effusion (scant). Physical Exam  Vitals and nursing note reviewed. Constitutional:       Appearance: Normal appearance. She is well-developed. Comments: There is no height or weight on file to calculate BMI. HENT:      Head: Normocephalic and atraumatic. Right Ear: External ear normal.      Left Ear: External ear normal.   Eyes:      Extraocular Movements: Extraocular movements intact. Conjunctiva/sclera: Conjunctivae normal.   Cardiovascular:      Rate and Rhythm: Normal rate. Pulses: Normal pulses. Pulmonary:      Effort: Pulmonary effort is normal.   Musculoskeletal:      Cervical back: Normal range of motion. Right knee: Effusion (scant) present. Instability Tests: Medial Lexa test positive. Lateral Lexa test negative. Comments: See ortho exam   Skin:     General: Skin is warm and dry. Neurological:      General: No focal deficit present. Mental Status: She is alert and oriented to person, place, and time. Mental status is at baseline.    Psychiatric: Mood and Affect: Mood normal.         Behavior: Behavior normal.         Thought Content: Thought content normal.         Judgment: Judgment normal.       This document was created using speech voice recognition software. Grammatical errors, random word insertions, pronoun errors, and incomplete sentences are an occasional consequence of this system due to software limitations, ambient noise, and hardware issues. Any formal questions or concerns about content, text, or information contained within the body of this dictation should be directly addressed to the provider for clarification.

## 2023-07-13 DIAGNOSIS — K50.80 CROHN'S DISEASE OF BOTH SMALL AND LARGE INTESTINE WITHOUT COMPLICATION (HCC): ICD-10-CM

## 2023-07-13 RX ORDER — PREDNISONE 10 MG/1
TABLET ORAL
Qty: 120 TABLET | Refills: 1 | OUTPATIENT
Start: 2023-07-13

## 2023-07-23 DIAGNOSIS — E03.8 OTHER SPECIFIED HYPOTHYROIDISM: ICD-10-CM

## 2023-07-24 RX ORDER — LEVOTHYROXINE SODIUM 0.05 MG/1
TABLET ORAL
Qty: 90 TABLET | Refills: 0 | Status: SHIPPED | OUTPATIENT
Start: 2023-07-24

## 2023-07-25 ENCOUNTER — HOSPITAL ENCOUNTER (OUTPATIENT)
Dept: RADIOLOGY | Facility: HOSPITAL | Age: 68
Discharge: HOME | End: 2023-07-25
Payer: COMMERCIAL

## 2023-07-25 DIAGNOSIS — K86.9 PANCREATIC LESION: ICD-10-CM

## 2023-07-25 PROCEDURE — A9585 GADOBUTROL INJECTION: HCPCS | Performed by: NURSE PRACTITIONER

## 2023-07-25 PROCEDURE — 74183 MRI ABD W/O CNTR FLWD CNTR: CPT

## 2023-07-25 PROCEDURE — G1004 CDSM NDSC: HCPCS

## 2023-07-25 RX ADMIN — GADOBUTROL 8 ML: 604.72 INJECTION INTRAVENOUS at 15:28

## 2023-08-01 ENCOUNTER — TELEPHONE (OUTPATIENT)
Age: 68
End: 2023-08-01

## 2023-08-01 ENCOUNTER — NURSE TRIAGE (OUTPATIENT)
Age: 68
End: 2023-08-01

## 2023-08-01 ENCOUNTER — TELEPHONE (OUTPATIENT)
Dept: OTHER | Facility: OTHER | Age: 68
End: 2023-08-01

## 2023-08-01 ENCOUNTER — OFFICE VISIT (OUTPATIENT)
Dept: PSYCHIATRY | Facility: CLINIC | Age: 68
End: 2023-08-01
Payer: COMMERCIAL

## 2023-08-01 VITALS
WEIGHT: 168 LBS | BODY MASS INDEX: 27.99 KG/M2 | HEIGHT: 65 IN | SYSTOLIC BLOOD PRESSURE: 120 MMHG | HEART RATE: 98 BPM | DIASTOLIC BLOOD PRESSURE: 79 MMHG

## 2023-08-01 DIAGNOSIS — F43.10 PTSD (POST-TRAUMATIC STRESS DISORDER): Chronic | ICD-10-CM

## 2023-08-01 DIAGNOSIS — K86.2 PANCREATIC CYST: Primary | ICD-10-CM

## 2023-08-01 DIAGNOSIS — F33.41 RECURRENT MAJOR DEPRESSIVE DISORDER, IN PARTIAL REMISSION (HCC): ICD-10-CM

## 2023-08-01 DIAGNOSIS — F33.41 MAJOR DEPRESSIVE DISORDER, RECURRENT, IN PARTIAL REMISSION (HCC): ICD-10-CM

## 2023-08-01 DIAGNOSIS — F63.0 GAMBLING DISORDER, MODERATE: ICD-10-CM

## 2023-08-01 DIAGNOSIS — F41.1 GENERALIZED ANXIETY DISORDER: ICD-10-CM

## 2023-08-01 DIAGNOSIS — F10.21 ALCOHOL DEPENDENCE IN REMISSION (HCC): Chronic | ICD-10-CM

## 2023-08-01 DIAGNOSIS — F41.9 ANXIETY AND DEPRESSION: Primary | ICD-10-CM

## 2023-08-01 DIAGNOSIS — F32.A ANXIETY AND DEPRESSION: Primary | ICD-10-CM

## 2023-08-01 DIAGNOSIS — F33.2 SEVERE EPISODE OF RECURRENT MAJOR DEPRESSIVE DISORDER, WITHOUT PSYCHOTIC FEATURES (HCC): ICD-10-CM

## 2023-08-01 DIAGNOSIS — F51.05 INSOMNIA RELATED TO ANOTHER MENTAL DISORDER: ICD-10-CM

## 2023-08-01 DIAGNOSIS — R26.2 AMBULATORY DYSFUNCTION: ICD-10-CM

## 2023-08-01 PROCEDURE — 99214 OFFICE O/P EST MOD 30 MIN: CPT | Performed by: NURSE PRACTITIONER

## 2023-08-01 PROCEDURE — 90833 PSYTX W PT W E/M 30 MIN: CPT | Performed by: NURSE PRACTITIONER

## 2023-08-01 RX ORDER — DULOXETIN HYDROCHLORIDE 60 MG/1
60 CAPSULE, DELAYED RELEASE ORAL DAILY
Qty: 90 CAPSULE | Refills: 1 | Status: SHIPPED | OUTPATIENT
Start: 2023-08-01

## 2023-08-01 RX ORDER — QUETIAPINE FUMARATE 100 MG/1
100 TABLET, FILM COATED ORAL
Qty: 90 TABLET | Refills: 1 | Status: SHIPPED | OUTPATIENT
Start: 2023-08-01 | End: 2024-01-28

## 2023-08-01 NOTE — BH CRISIS PLAN
Client Name: Deangelo Gautam       Client YOB: 1955  : 1955    Treatment Team (include name and contact information):     Psychotherapist: none    Psychiatrist: alfredo    Crohn's disease case manager     Healthcare Provider  Sara Augustin MD  [address] [place] [address] [address] [address]  [phone]    Type of Plan   * Child plans (children 15 yo and younger) must be completed and signed by the child's legal guardian   * Plans for all individuals 15 yo and above must be signed by the client. Plan Type: adolescent/adult (15 and over) Initial      My Personal Strengths are (in the client's own words):  "compassionate, good listener, nonjudgemental"    The stressors and triggers that may put me at risk are:  conflict, especially marital, disappointment, health concerns    Coping skills I can use to keep myself calm and safe:  [place]/meditate, breathing, hobby, (I.e. reading)    Coping skills/supports I can use to maintain abstinence from substance use:   thinking it through, sponsor, knowing does not want to go backwards to the life before    The people that provide me with help and support: (Include name, contact, and how they can help)   Support person #1:  Duke Chavez (friend)    * Phone number: in cell phone    * How can they help me? Support and talk   Support person #2:Caro (sponsor)    * Phone number: in cell phone    * How can they help me? Support and talk     Support person #3: Tarik Chan (friend)    * Phone number: in cell phone    * How can they help me? Support and talk    In the past, the following has helped me in times of crisis:    Other: praying, calling a friend, letting go let God, recognizing what is in my hands and what's not.       If it is an emergency and you need immediate help, call     If there is a possibility of danger to yourself or others, call the following crisis hotline resources:     Adult Crisis Numbers  Suicide Prevention Hotline - Dial   [facility]: [address]: [address]: [address]: [phone]  [address] [place] [address] Street: [phone]  [place]: [address]: [address]: [phone] (daytime). [phone] (after hours, weekends, holidays)     Child/Adolescent Crisis Numbers   [facility]: [address]: [phone]   [facility]: [phone]   [address] [place] [address] Street: [phone]    Please note: Some [facility] do not have a separate number for Child/Adolescent specific crisis. If your county is not listed under Child/Adolescent, please call the adult number for your county     National Talk to Text Line   [employer] - [phone]    In the event your feelings become unmanageable, and you cannot reach your support system, you will call 911 immediately or go to the nearest hospital emergency room.

## 2023-08-01 NOTE — TELEPHONE ENCOUNTER
Jovana Ulloa from [facility] - calledto confirm appts for infusions and pass on msge to nursing staff. Sent msge via [employer]

## 2023-08-01 NOTE — TELEPHONE ENCOUNTER
Ordered by Elissa Dunbar,    Please inform patient that her MRI showed pancreas cysts similar to her recent CT scan. No concerning features. Based on the protocol she should have repeat MRI in 2 years unless she has any family history of pancreatic cancer.

## 2023-08-01 NOTE — TELEPHONE ENCOUNTER
----- Message from Mallory Victoria sent at 8/1/2023 11:36 AM EDT -----  Called to confirm # of infusions pt already received and is still scheduled for. .. also wanted to let us know -- that because pt is a [place] resident and we are referring her to [facility] in [place] , that the office needs to call in an authorization to [employer] prior.

## 2023-08-01 NOTE — PSYCH
Visit Time    Visit Start Time: 5:30 PM  Visit Stop Time: 6:00 PM  Total Visit Duration: 30 minutes    Subjective:     Patient ID: Gwendolyn Sharpe is a 78 y.o. female history of PTSD, insomnia, anxiety, depression, mild Gambling addiction, recovering alcoholic seen for medication management and mood assessment. Jerrlyn Bosworth reports that she is happy, she found a job as . She states she is feeling better with a good balance in her life, medications help her anxiety and depression; however, she does find that she sweats more than she usually does. Denies alcohol use and bought a couple scratch off lottery tickets. She reports appetite is good and she is sleeping with the use of Seroquel at night.   She reports taking medication as prescribed and family and friends are supportive ELLY-7 score of 2 indicates mild anxiety, PHQ-9 score of 1 indicates no depression    HPI ROS Appetite Changes and Sleep: normal appetite and normal energy level    Review Of Systems:     Mood Anxiety   Behavior Normal    Thought Content Normal   General Sleep Disturbances   Personality Normal   Other Psych Symptoms Normal   Constitutional As Noted in HPI   ENT As Noted in HPI   Cardiovascular As Noted in HPI   Respiratory As Noted in HPI   Gastrointestinal As Noted in HPI   Genitourinary As Noted in HPI   Musculoskeletal As Noted in HPI   Integumentary As Noted in HPI   Neurological As Noted in HPI   Endocrine Normal    Other Symptoms Normal      Substance Abuse History:  Social History     Substance and Sexual Activity   Drug Use No       Family Psychiatric History:   Family History   Problem Relation Age of Onset   • Heart disease Mother    • Stroke Mother 58   • COPD Mother    • Arthritis Mother    • Hypertension Father    • Kidney disease Brother         kidney transplant   • Multiple sclerosis Sister    • No Known Problems Maternal Aunt    • No Known Problems Maternal Uncle    • No Known Problems Paternal Aunt    • No Known Problems Paternal Uncle    • No Known Problems Maternal Grandmother    • No Known Problems Maternal Grandfather    • No Known Problems Paternal Grandmother    • No Known Problems Paternal Grandfather    • Dislocations Sister    • Neurological problems Sister    • Scoliosis Sister    • ADD / ADHD Neg Hx    • Anesthesia problems Neg Hx    • Cancer Neg Hx    • Clotting disorder Neg Hx    • Collagen disease Neg Hx    • Diabetes Neg Hx    • Dislocations Neg Hx    • Learning disabilities Neg Hx    • Neurological problems Neg Hx    • Osteoporosis Neg Hx    • Rheumatologic disease Neg Hx    • Scoliosis Neg Hx    • Vascular Disease Neg Hx        The following portions of the patient's history were reviewed and updated as appropriate: allergies, current medications, past family history, past medical history, past social history, past surgical history and problem list.    Social History     Socioeconomic History   • Marital status: /Civil Union     Spouse name: Not on file   • Number of children: Not on file   • Years of education: Not on file   • Highest education level: Not on file   Occupational History   • Not on file   Tobacco Use   • Smoking status: Never   • Smokeless tobacco: Never   Vaping Use   • Vaping Use: Never used   Substance and Sexual Activity   • Alcohol use: Not Currently   • Drug use: No   • Sexual activity: Not Currently     Partners: Male   Other Topics Concern   • Not on file   Social History Narrative   • Not on file     Social Determinants of Health     Financial Resource Strain: Not on file   Food Insecurity: Not on file   Transportation Needs: No Transportation Needs (5/3/2023)    PRAPARE - Transportation    • Lack of Transportation (Medical): No    • Lack of Transportation (Non-Medical):  No   Physical Activity: Not on file   Stress: Not on file   Social Connections: Not on file   Intimate Partner Violence: Not on file   Housing Stability: Unknown (5/3/2023)    Housing Stability Vital Sign • Unable to Pay for Housing in the Last Year: No    • Number of Places Lived in the Last Year: Not on file    • Unstable Housing in the Last Year: No     Social History     Social History Narrative   • Not on file       Objective:       Mental status:  Appearance calm and cooperative , adequate hygiene and grooming and good eye contact    Mood anxious   Affect affect appropriate    Speech a normal rate   Thought Processes normal thought processes   Hallucinations no hallucinations present    Thought Content no delusions   Abnormal Thoughts no suicidal thoughts  and no homicidal thoughts    Orientation  oriented to person and place and time   Remote Memory short term memory intact and long term memory intact   Attention Span concentration intact   Intellect Appears to be of Average Intelligence   Insight Insight intact   Judgement judgment was intact   Muscle Strength Muscle strength and tone were normal   Language no difficulty naming common objects   Fund of Knowledge displays adequate knowledge of current events   Pain none   Pain Scale 0       Assessment/Plan:       Diagnoses and all orders for this visit:    Anxiety and depression    Alcohol dependence in remission ([address])    Ambulatory dysfunction    Recurrent major depressive disorder, in partial remission ([address])    Gambling disorder, moderate    Generalized anxiety disorder    Insomnia related to another mental disorder    PTSD (post-traumatic stress disorder)    Severe episode of recurrent major depressive disorder, without psychotic features ([address])    Major depressive disorder, recurrent, in partial remission ([address])  -     DULoxetine (CYMBALTA) 60 mg delayed release capsule; Take 1 capsule (60 mg total) by mouth daily  -     QUEtiapine (SEROquel) 100 mg tablet;  Take 1 tablet (100 mg total) by mouth daily at bedtime            Treatment Recommendations- Risks Benefits      Immediate Medical/Psychiatric/Psychotherapy Treatments and Any Precautions:  reviewed medication continue with treatment plan    Risks, Benefits And Possible Side Effects Of Medications:  {PSYCH RISK, BENEFITS AND POSSIBLE SIDE EFFECTS (Optional):05177       Psychotherapy Provided: 30 minIndividual psychotherapy provided.     Supportive therapy  Medication evaluation  Mood assessment  Review survey and discussed results      Goals discussed in session: Stable moods

## 2023-08-02 NOTE — TELEPHONE ENCOUNTER
Spoke to patient with results and the info if pancreatic cancer develops in family to let us know. MRI 2 year recall entered.

## 2023-08-02 NOTE — TELEPHONE ENCOUNTER
8/2 am aware of needing a prior auth for [facility]. Will get that order sent over to them once pt has had her last loading infusion.

## 2023-08-04 ENCOUNTER — HOSPITAL ENCOUNTER (OUTPATIENT)
Dept: INFUSION CENTER | Facility: HOSPITAL | Age: 68
End: 2023-08-04
Attending: INTERNAL MEDICINE
Payer: COMMERCIAL

## 2023-08-04 VITALS
RESPIRATION RATE: 18 BRPM | DIASTOLIC BLOOD PRESSURE: 78 MMHG | WEIGHT: 167.55 LBS | BODY MASS INDEX: 27.92 KG/M2 | TEMPERATURE: 96.8 F | HEIGHT: 65 IN | SYSTOLIC BLOOD PRESSURE: 143 MMHG | OXYGEN SATURATION: 100 % | HEART RATE: 95 BPM

## 2023-08-04 DIAGNOSIS — K50.80 CROHN'S DISEASE OF BOTH SMALL AND LARGE INTESTINE WITHOUT COMPLICATION (HCC): Primary | ICD-10-CM

## 2023-08-04 RX ORDER — SODIUM CHLORIDE 9 MG/ML
20 INJECTION, SOLUTION INTRAVENOUS ONCE
Status: COMPLETED | OUTPATIENT
Start: 2023-08-04 | End: 2023-08-04

## 2023-08-04 RX ORDER — DIPHENHYDRAMINE HCL 25 MG
25 TABLET ORAL ONCE
OUTPATIENT
Start: 2023-08-07

## 2023-08-04 RX ORDER — ACETAMINOPHEN 325 MG/1
650 TABLET ORAL ONCE
Status: COMPLETED | OUTPATIENT
Start: 2023-08-04 | End: 2023-08-04

## 2023-08-04 RX ORDER — ACETAMINOPHEN 325 MG/1
650 TABLET ORAL ONCE
OUTPATIENT
Start: 2023-08-07

## 2023-08-04 RX ORDER — METHYLPREDNISOLONE SODIUM SUCCINATE 40 MG/ML
40 INJECTION, POWDER, LYOPHILIZED, FOR SOLUTION INTRAMUSCULAR; INTRAVENOUS ONCE
OUTPATIENT
Start: 2023-08-07

## 2023-08-04 RX ORDER — DIPHENHYDRAMINE HCL 25 MG
25 TABLET ORAL ONCE
Status: COMPLETED | OUTPATIENT
Start: 2023-08-04 | End: 2023-08-04

## 2023-08-04 RX ORDER — SODIUM CHLORIDE 9 MG/ML
20 INJECTION, SOLUTION INTRAVENOUS ONCE
OUTPATIENT
Start: 2023-08-07

## 2023-08-04 RX ORDER — METHYLPREDNISOLONE SODIUM SUCCINATE 40 MG/ML
40 INJECTION, POWDER, LYOPHILIZED, FOR SOLUTION INTRAMUSCULAR; INTRAVENOUS ONCE
Status: COMPLETED | OUTPATIENT
Start: 2023-08-04 | End: 2023-08-04

## 2023-08-04 RX ADMIN — DIPHENHYDRAMINE HYDROCHLORIDE 25 MG: 25 TABLET ORAL at 08:53

## 2023-08-04 RX ADMIN — SODIUM CHLORIDE 20 ML/HR: 0.9 INJECTION, SOLUTION INTRAVENOUS at 08:53

## 2023-08-04 RX ADMIN — ACETAMINOPHEN 650 MG: 325 TABLET ORAL at 08:53

## 2023-08-04 RX ADMIN — INFLIXIMAB 380 MG: 100 INJECTION, POWDER, LYOPHILIZED, FOR SOLUTION INTRAVENOUS at 09:50

## 2023-08-04 RX ADMIN — METHYLPREDNISOLONE SODIUM SUCCINATE 40 MG: 40 INJECTION, POWDER, FOR SOLUTION INTRAMUSCULAR; INTRAVENOUS at 08:53

## 2023-08-17 ENCOUNTER — OFFICE VISIT (OUTPATIENT)
Dept: OBGYN CLINIC | Facility: CLINIC | Age: 68
End: 2023-08-17
Payer: COMMERCIAL

## 2023-08-17 VITALS
TEMPERATURE: 98 F | SYSTOLIC BLOOD PRESSURE: 145 MMHG | HEART RATE: 98 BPM | DIASTOLIC BLOOD PRESSURE: 85 MMHG | HEIGHT: 65 IN | BODY MASS INDEX: 27.82 KG/M2 | WEIGHT: 167 LBS

## 2023-08-17 DIAGNOSIS — M25.561 CHRONIC PAIN OF RIGHT KNEE: ICD-10-CM

## 2023-08-17 DIAGNOSIS — M17.11 PRIMARY OSTEOARTHRITIS OF RIGHT KNEE: Primary | ICD-10-CM

## 2023-08-17 DIAGNOSIS — G89.29 CHRONIC PAIN OF RIGHT KNEE: ICD-10-CM

## 2023-08-17 PROCEDURE — 20610 DRAIN/INJ JOINT/BURSA W/O US: CPT | Performed by: ORTHOPAEDIC SURGERY

## 2023-08-17 PROCEDURE — 99214 OFFICE O/P EST MOD 30 MIN: CPT | Performed by: ORTHOPAEDIC SURGERY

## 2023-08-17 RX ORDER — BUPIVACAINE HYDROCHLORIDE 5 MG/ML
2 INJECTION, SOLUTION EPIDURAL; INTRACAUDAL
Status: COMPLETED | OUTPATIENT
Start: 2023-08-17 | End: 2023-08-17

## 2023-08-17 RX ORDER — TRIAMCINOLONE ACETONIDE 40 MG/ML
80 INJECTION, SUSPENSION INTRA-ARTICULAR; INTRAMUSCULAR
Status: COMPLETED | OUTPATIENT
Start: 2023-08-17 | End: 2023-08-17

## 2023-08-17 RX ADMIN — BUPIVACAINE HYDROCHLORIDE 2 ML: 5 INJECTION, SOLUTION EPIDURAL; INTRACAUDAL at 08:00

## 2023-08-17 RX ADMIN — TRIAMCINOLONE ACETONIDE 80 MG: 40 INJECTION, SUSPENSION INTRA-ARTICULAR; INTRAMUSCULAR at 08:00

## 2023-08-17 NOTE — PROGRESS NOTES
Assessment/Plan:  1. Primary osteoarthritis of right knee  Large joint arthrocentesis: R knee      2. Chronic pain of right knee  Large joint arthrocentesis: R knee        Scribe Attestation    I,:  Noemi Greco am acting as a scribe while in the presence of the attending physician.:       I,:  Uche Mak, DO personally performed the services described in this documentation    as scribed in my presence.:         Don Au is a 67-year-old female who presents to the office today for follow-up evaluation of her right knee pain. We discussed treatment options for her right knee pain, and I offered to perform an aspiration and corticosteroid injection today. She consented to and underwent the intervention as documented below without issue or complication. This was well-tolerated by the patient. Postinjection instructions were provided. She understands this can be repeated no sooner than 3 months. She may continue to take meloxicam as needed and continue to wear her brace. She will follow-up as needed. Large joint arthrocentesis: R knee  Universal Protocol:  Consent: Verbal consent obtained.   Risks and benefits: risks, benefits and alternatives were discussed  Consent given by: patient  Patient understanding: patient states understanding of the procedure being performed  Site marked: the operative site was marked  Patient identity confirmed: verbally with patient    Supporting Documentation  Indications: pain   Procedure Details  Location: knee - R knee  Needle size: 18 G (Same needle used for aspiration and injection)  Ultrasound guidance: no  Approach: superior (Superior lateral)  Medications administered: 2 mL bupivacaine (PF) 0.5 %; 80 mg triamcinolone acetonide 40 mg/mL    Aspirate amount: 17 mL  Aspirate: clear and yellow (Benign-appearing)    Patient tolerance: patient tolerated the procedure well with no immediate complications  Dressing:  Sterile dressing applied          Subjective:  right knee pain follow up. Patient ID: Merary Fuller is a 78 y.o. female who presents to the office today for a follow-up evaluation of her right knee pain. She states that her right knee pain is at 8/10. She states that she is still taking meloxicam once a day and wearing a brace every day. She states that carrying items up steps increases her symptoms. She continues to use a cane for ambulatory assistance. Review of Systems   Constitutional: Positive for activity change. Negative for chills, fever and unexpected weight change. HENT: Negative for hearing loss, nosebleeds and sore throat. Eyes: Negative for pain, redness and visual disturbance. Respiratory: Negative for cough, shortness of breath and wheezing. Cardiovascular: Negative for chest pain, palpitations and leg swelling. Gastrointestinal: Negative for abdominal pain, nausea and vomiting. Endocrine: Negative for polydipsia and polyuria. Genitourinary: Negative for dysuria and hematuria. Musculoskeletal: Positive for arthralgias and myalgias. See HPI   Skin: Negative for rash and wound. Neurological: Negative for dizziness, numbness and headaches. Psychiatric/Behavioral: Negative for decreased concentration and suicidal ideas. The patient is not nervous/anxious.           Past Medical History:   Diagnosis Date   • Abdominal adhesions    • Anesthesia complication     difficult to wake up   • Anxiety    • Arthritis    • Cancer (HCC)     melanoma   • Colon polyp    • Depression    • Depression    • Disease of thyroid gland    • Fibromyalgia    • Fibromyalgia, primary    • Fractures 2006   • GERD (gastroesophageal reflux disease)    • History of cholecystectomy 09/07/2019   • Hyperlipidemia    • Irregular heart beat     can be tachy; also has orthoststic hypotension   • Lazy eye     resolved: 3/27/17   • Medical clearance for psychiatric admission 07/13/2021   • Melanoma ([address]) 2010   • Osteoarthritis 07/2018   • Osteopenia     with joint pain-elevated DEV   • Psychiatric disorder    • Shortness of breath     assoc with tachycardia   • Stomach disorder 1995   • Tinnitus    • Wears glasses     for reading       Past Surgical History:   Procedure Laterality Date   • ABDOMINAL SURGERY      lysis of adhesions x 2   • APPENDECTOMY     • CERVICAL FUSION      May 5,2021 and Jan. 01,2020   • CHOLECYSTECTOMY      open   • COLONOSCOPY     • DILATION AND CURETTAGE OF UTERUS     • FOOT SURGERY  05/2017   • NECK SURGERY  01/2019 5/2021   • NEUROMA EXCISION Right 05/26/2017    Procedure: EXCISION MASS / FIBROMA FOOT;  Surgeon: Maria Elena Rosales DPM;  Location: [facility];  Service:    • PARS PLANA VITRECTOMY W/ REPAIR OF MACULAR HOLE  12/23/2020   • ME XCAPSL CTRC RMVL INSJ IO LENS PROSTH W/O ECP Left 12/06/2021    Procedure: EXTRACTION EXTRACAPSULAR CATARACT PHACO INTRAOCULAR LENS (IOL);   Surgeon: Fausto Cameron MD;  Location: [facility] MAIN OR;  Service: Ophthalmology   • RIGHT OOPHORECTOMY     • WISDOM TOOTH EXTRACTION      x4       Family History   Problem Relation Age of Onset   • Heart disease Mother    • Stroke Mother 58   • COPD Mother    • Arthritis Mother    • Hypertension Father    • Kidney disease Brother         kidney transplant   • Multiple sclerosis Sister    • No Known Problems Maternal Aunt    • No Known Problems Maternal Uncle    • No Known Problems Paternal Aunt    • No Known Problems Paternal Uncle    • No Known Problems Maternal Grandmother    • No Known Problems Maternal Grandfather    • No Known Problems Paternal Grandmother    • No Known Problems Paternal Grandfather    • Dislocations Sister    • Neurological problems Sister    • Scoliosis Sister    • ADD / ADHD Neg Hx    • Anesthesia problems Neg Hx    • Cancer Neg Hx    • Clotting disorder Neg Hx    • Collagen disease Neg Hx    • Diabetes Neg Hx    • Dislocations Neg Hx    • Learning disabilities Neg Hx    • Neurological problems Neg Hx    • Osteoporosis Neg Hx    • Rheumatologic disease Neg Hx    • Scoliosis Neg Hx    • Vascular Disease Neg Hx        Social History     Occupational History   • Not on file   Tobacco Use   • Smoking status: Never   • Smokeless tobacco: Never   Vaping Use   • Vaping Use: Never used   Substance and Sexual Activity   • Alcohol use: Not Currently   • Drug use: No   • Sexual activity: Not Currently     Partners: Male         Current Outpatient Medications:   •  acetaminophen (TYLENOL) 650 mg CR tablet, Take 1 tablet (650 mg total) by mouth every 8 (eight) hours as needed for headaches, moderate pain or mild pain, Disp: , Rfl: 0  •  albuterol (ProAir HFA) 90 mcg/act inhaler, Inhale 2 puffs every 6 (six) hours as needed for wheezing, Disp: 8.5 g, Rfl: 0  •  atorvastatin (LIPITOR) 20 mg tablet, TAKE 1 TABLET BY MOUTH EVERY DAY, Disp: 90 tablet, Rfl: 1  •  budesonide (Pulmicort Flexhaler) 90 MCG/ACT inhaler, Inhale 1 puff 2 (two) times a day, Disp: 1 each, Rfl: 5  •  Calcium Carb-Cholecalciferol 600-12.5 MG-MCG CAPS, Take by mouth daily in the early morning, Disp: , Rfl:   •  [employer] Aspirin Adult Low Strength 81 MG EC tablet, CHEW AND SWALLOW 1 TABLET BY MOUTH ONCE DAILY, Disp: , Rfl:   •  DULoxetine (CYMBALTA) 60 mg delayed release capsule, Take 1 capsule (60 mg total) by mouth daily, Disp: 90 capsule, Rfl: 1  •  esomeprazole (NexIUM) 40 MG capsule, Take 1 capsule (40 mg total) by mouth daily before breakfast, Disp: 90 capsule, Rfl: 5  •  gabapentin (NEURONTIN) 300 mg capsule, Take 1 capsule (300 mg total) by mouth 3 (three) times a day, Disp: 90 capsule, Rfl: 5  •  levothyroxine 50 mcg tablet, TAKE 1 TABLET BY MOUTH EVERY DAY, Disp: 90 tablet, Rfl: 0  •  Magnesium 400 MG TABS, Take by mouth daily, Disp: , Rfl:   •  meloxicam (MOBIC) 7.5 mg tablet, TAKE 1 TABLET BY MOUTH EVERY DAY, Disp: 30 tablet, Rfl: 1  •  midodrine (PROAMATINE) 10 MG tablet, Take 1 tablet (10 mg total) by mouth 2 (two) times a day, Disp: , Rfl:   •  QUEtiapine (SEROquel) 100 mg tablet, Take 1 tablet (100 mg total) by mouth daily at bedtime, Disp: 90 tablet, Rfl: 1  •  Sodium Fluoride 5000 PPM 1.1 % PSTE, USE ONCE DAILY PREFERABLY AT NIGHT, Disp: , Rfl:   •  calcium carbonate (OS-WILLY) 1250 (500 Ca) MG tablet, Take 1,250 mg by mouth (Patient not taking: Reported on 5/8/2023), Disp: , Rfl:   •  predniSONE 10 mg tablet, Take 3 tablets (30 mg total) by mouth daily 3 pills daily for 1 week, then 2 pills daily for 1 week, then 1 pill daily for 1 week, Disp: 120 tablet, Rfl: 1  •  sucralfate (CARAFATE) 1 g tablet, Take 1 tablet (1 g total) by mouth 4 (four) times a day, Disp: 120 tablet, Rfl: 0    Allergies   Allergen Reactions   • Meperidine Lightheadedness and Other (See Comments)   • Sulfa Antibiotics Hives       Objective:  Vitals:    08/17/23 0803   BP: 145/85   Pulse: 98   Temp: 98 °F (36.7 °C)       Body mass index is 27.79 kg/m². Right Knee Exam     Tenderness   The patient is experiencing tenderness in the medial joint line and lateral joint line. Range of Motion   Extension:  0 normal   Flexion:  120 normal     Tests   Lexa:  Medial - positive Lateral - negative  Varus: negative Valgus: negative  Drawer:  Anterior - negative    Posterior - negative  Patellar apprehension: negative    Other   Erythema: absent  Scars: absent  Sensation: normal  Pulse: present  Swelling: none  Effusion: effusion (scant) present    Comments:  TTP lateral femoral condyle   effussion          Observations     Right Knee   Positive for effusion (scant). Physical Exam  Vitals and nursing note reviewed. Constitutional:       Appearance: Normal appearance. She is well-developed. HENT:      Head: Normocephalic and atraumatic. Right Ear: External ear normal.      Left Ear: External ear normal.   Eyes:      General: No scleral icterus. Extraocular Movements: Extraocular movements intact. Conjunctiva/sclera: Conjunctivae normal.   Cardiovascular:      Rate and Rhythm: Normal rate.    Pulmonary:      Effort: Pulmonary effort is normal. No respiratory distress. Musculoskeletal:      Cervical back: Normal range of motion and neck supple. Right knee: Effusion (scant) present. Instability Tests: Medial Lexa test positive. Lateral Lexa test negative. Comments: See Ortho exam   Skin:     General: Skin is warm and dry. Neurological:      General: No focal deficit present. Mental Status: She is alert and oriented to person, place, and time. Psychiatric:         Behavior: Behavior normal.         I have personally reviewed pertinent films in PACS. This document was created using speech voice recognition software. Grammatical errors, random word insertions, pronoun errors, and incomplete sentences are an occasional consequence of this system due to software limitations, ambient noise, and hardware issues. Any formal questions or concerns about content, text, or information contained within the body of this dictation should be directly addressed to the provider for clarification.

## 2023-08-22 ENCOUNTER — NURSE TRIAGE (OUTPATIENT)
Age: 68
End: 2023-08-22

## 2023-08-22 NOTE — TELEPHONE ENCOUNTER
Regarding: Hair loss and bleeding post Remicade infusion  ----- Message from Favian Fernando sent at 8/22/2023 10:07 AM EDT -----  " Since is started the Remicade infusion my hair has been falling out and I have been bleeding easily.  I would like a call back to discuss."

## 2023-08-22 NOTE — TELEPHONE ENCOUNTER
I spoke with patient, she has had two infusions and next is due end of September (not scheduled yet). She is concerned about side effects of hair loss, "it is horrible" and if she bumps into something she starts bleeding. She also does not note much improvement since start of Remicade.  Patient phone [phone]

## 2023-08-23 ENCOUNTER — HOSPITAL ENCOUNTER (OUTPATIENT)
Dept: RADIOLOGY | Facility: HOSPITAL | Age: 68
Discharge: HOME | End: 2023-08-23
Payer: COMMERCIAL

## 2023-08-23 VITALS — BODY MASS INDEX: 27.49 KG/M2 | HEIGHT: 65 IN | WEIGHT: 165 LBS

## 2023-08-23 DIAGNOSIS — Z12.31 ENCOUNTER FOR SCREENING MAMMOGRAM FOR MALIGNANT NEOPLASM OF BREAST: ICD-10-CM

## 2023-08-23 DIAGNOSIS — K50.80 CROHN'S DISEASE OF BOTH SMALL AND LARGE INTESTINE WITHOUT COMPLICATION (HCC): Primary | ICD-10-CM

## 2023-08-23 PROCEDURE — 77067 SCR MAMMO BI INCL CAD: CPT

## 2023-08-23 PROCEDURE — 77063 BREAST TOMOSYNTHESIS BI: CPT

## 2023-08-23 NOTE — TELEPHONE ENCOUNTER
Celine Rivero MD  Gastro Ibd; Gisela Pride 20 minutes ago (2:34 PM)       The hair loss may well be related to the infliximab, and if this is severe, her medication regimen may need to be changed. Please inquire about coverage for Entyvio as this does not seem to be associated with hair loss. I ordered laboratory testing to evaluate the bruising issue.

## 2023-08-23 NOTE — TELEPHONE ENCOUNTER
I called and spoke with the patient. Relayed provider's message. Pt will go this week for blood work. Patient would like to wait until after next infliximab infusion on 9/29 to decide if biologic will need to be changed. Discussed Entyvio infusion. Pt will monitor hair loss and try a strengther shampoo.

## 2023-08-29 ENCOUNTER — TELEPHONE (OUTPATIENT)
Age: 68
End: 2023-08-29

## 2023-08-29 NOTE — TELEPHONE ENCOUNTER
[facility] is calling to request that a TB  and Hep B  Test be entered for the patient in addition to the other labs     Call [phone] if further questions

## 2023-08-30 DIAGNOSIS — K50.80 CROHN'S DISEASE OF BOTH SMALL AND LARGE INTESTINE WITHOUT COMPLICATION (HCC): Primary | ICD-10-CM

## 2023-08-30 NOTE — TELEPHONE ENCOUNTER
I called [place] and left message letting her know that bw scripts were entered and to c/b with any further questions. I did not leave patient information because I did not know if it was secure line.

## 2023-09-08 ENCOUNTER — TELEPHONE (OUTPATIENT)
Dept: GASTROENTEROLOGY | Facility: CLINIC | Age: 68
End: 2023-09-08

## 2023-09-08 NOTE — TELEPHONE ENCOUNTER
9/8 yes she just did her loading dosing of remicade and it was approved with those labs.  Not sure why they having issue

## 2023-09-08 NOTE — TELEPHONE ENCOUNTER
Incoming call from [facility] questioning if patient completed TB/Hep B labs. TB/Hep B labs completed on 5/6/2023. Faxed to [facility] . Fax confirmed. [facility] states labs from 5/6/23 are current and can continue with scheduling patient for infliximab.

## 2023-09-11 NOTE — TELEPHONE ENCOUNTER
I spoke with the patient. Patient states will call [facility] today and call our office with any concerns or questions.

## 2023-09-15 ENCOUNTER — HOSPITAL ENCOUNTER (OUTPATIENT)
Dept: RADIOLOGY | Facility: HOSPITAL | Age: 68
Discharge: HOME | End: 2023-09-15
Payer: COMMERCIAL

## 2023-09-15 DIAGNOSIS — R91.8 ABNORMAL CT SCAN OF LUNG: ICD-10-CM

## 2023-09-15 PROCEDURE — G1004 CDSM NDSC: HCPCS

## 2023-09-15 PROCEDURE — 71250 CT THORAX DX C-: CPT

## 2023-09-25 ENCOUNTER — TELEPHONE (OUTPATIENT)
Dept: NEUROLOGY | Facility: CLINIC | Age: 68
End: 2023-09-25

## 2023-09-25 DIAGNOSIS — M17.11 PRIMARY OSTEOARTHRITIS OF RIGHT KNEE: ICD-10-CM

## 2023-09-25 DIAGNOSIS — M25.561 CHRONIC PAIN OF RIGHT KNEE: ICD-10-CM

## 2023-09-25 DIAGNOSIS — G89.29 CHRONIC PAIN OF RIGHT KNEE: ICD-10-CM

## 2023-09-25 RX ORDER — MELOXICAM 7.5 MG/1
TABLET ORAL
Qty: 30 TABLET | Refills: 1 | Status: SHIPPED | OUTPATIENT
Start: 2023-09-25

## 2023-09-25 NOTE — TELEPHONE ENCOUNTER
1st attempt to reach patient from [facility]. Aditive messaging for finding a doctor. Patient already seen by office. Patient also stated she was okay and will keep her appointment with [facility].

## 2023-09-28 DIAGNOSIS — R79.89 LFT ELEVATION: Primary | ICD-10-CM

## 2023-10-03 ENCOUNTER — APPOINTMENT (EMERGENCY)
Dept: RADIOLOGY | Facility: HOSPITAL | Age: 68
End: 2023-10-03
Payer: COMMERCIAL

## 2023-10-03 ENCOUNTER — OFFICE VISIT (OUTPATIENT)
Dept: PSYCHIATRY | Facility: CLINIC | Age: 68
End: 2023-10-03
Payer: COMMERCIAL

## 2023-10-03 ENCOUNTER — HOSPITAL ENCOUNTER (EMERGENCY)
Facility: HOSPITAL | Age: 68
Discharge: HOME | End: 2023-10-03
Attending: EMERGENCY MEDICINE
Payer: COMMERCIAL

## 2023-10-03 VITALS
WEIGHT: 169 LBS | HEIGHT: 65 IN | TEMPERATURE: 97 F | RESPIRATION RATE: 18 BRPM | SYSTOLIC BLOOD PRESSURE: 150 MMHG | BODY MASS INDEX: 28.16 KG/M2 | OXYGEN SATURATION: 98 % | DIASTOLIC BLOOD PRESSURE: 79 MMHG | HEART RATE: 91 BPM

## 2023-10-03 VITALS
BODY MASS INDEX: 28.16 KG/M2 | HEIGHT: 65 IN | SYSTOLIC BLOOD PRESSURE: 144 MMHG | HEART RATE: 90 BPM | DIASTOLIC BLOOD PRESSURE: 93 MMHG | WEIGHT: 169 LBS

## 2023-10-03 DIAGNOSIS — F63.0 GAMBLING DISORDER, MODERATE: ICD-10-CM

## 2023-10-03 DIAGNOSIS — F41.1 GENERALIZED ANXIETY DISORDER: ICD-10-CM

## 2023-10-03 DIAGNOSIS — F10.21 ALCOHOL DEPENDENCE IN REMISSION (HCC): Chronic | ICD-10-CM

## 2023-10-03 DIAGNOSIS — F33.41 RECURRENT MAJOR DEPRESSIVE DISORDER, IN PARTIAL REMISSION (HCC): ICD-10-CM

## 2023-10-03 DIAGNOSIS — F41.9 ANXIETY AND DEPRESSION: ICD-10-CM

## 2023-10-03 DIAGNOSIS — F51.01 PRIMARY INSOMNIA: ICD-10-CM

## 2023-10-03 DIAGNOSIS — F33.2 SEVERE EPISODE OF RECURRENT MAJOR DEPRESSIVE DISORDER, WITHOUT PSYCHOTIC FEATURES (HCC): Primary | ICD-10-CM

## 2023-10-03 DIAGNOSIS — F32.A ANXIETY AND DEPRESSION: ICD-10-CM

## 2023-10-03 DIAGNOSIS — F51.05 INSOMNIA RELATED TO ANOTHER MENTAL DISORDER: ICD-10-CM

## 2023-10-03 DIAGNOSIS — N39.0 UTI (URINARY TRACT INFECTION): Primary | ICD-10-CM

## 2023-10-03 DIAGNOSIS — F33.41 MAJOR DEPRESSIVE DISORDER, RECURRENT, IN PARTIAL REMISSION (HCC): ICD-10-CM

## 2023-10-03 DIAGNOSIS — F43.10 PTSD (POST-TRAUMATIC STRESS DISORDER): Chronic | ICD-10-CM

## 2023-10-03 LAB
ALBUMIN SERPL BCP-MCNC: 4.1 G/DL (ref 3.5–5)
ALP SERPL-CCNC: 78 U/L (ref 34–104)
ALT SERPL W P-5'-P-CCNC: 190 U/L (ref 7–52)
ANION GAP SERPL CALCULATED.3IONS-SCNC: 6 MMOL/L
AST SERPL W P-5'-P-CCNC: 101 U/L (ref 13–39)
BACTERIA UR QL AUTO: NORMAL /HPF
BASOPHILS # BLD AUTO: 0.03 THOUSANDS/ÂΜL (ref 0–0.1)
BASOPHILS NFR BLD AUTO: 1 % (ref 0–1)
BILIRUB SERPL-MCNC: 0.37 MG/DL (ref 0.2–1)
BILIRUB UR QL STRIP: NEGATIVE
BUN SERPL-MCNC: 20 MG/DL (ref 5–25)
CALCIUM SERPL-MCNC: 9 MG/DL (ref 8.4–10.2)
CHLORIDE SERPL-SCNC: 104 MMOL/L (ref 96–108)
CLARITY UR: CLEAR
CO2 SERPL-SCNC: 28 MMOL/L (ref 21–32)
COLOR UR: ABNORMAL
CREAT SERPL-MCNC: 0.76 MG/DL (ref 0.6–1.3)
EOSINOPHIL # BLD AUTO: 0.1 THOUSAND/ÂΜL (ref 0–0.61)
EOSINOPHIL NFR BLD AUTO: 2 % (ref 0–6)
ERYTHROCYTE [DISTWIDTH] IN BLOOD BY AUTOMATED COUNT: 14 % (ref 11.6–15.1)
GFR SERPL CREATININE-BSD FRML MDRD: 81 ML/MIN/1.73SQ M
GLUCOSE SERPL-MCNC: 93 MG/DL (ref 65–140)
GLUCOSE UR STRIP-MCNC: NEGATIVE MG/DL
HCT VFR BLD AUTO: 41.4 % (ref 34.8–46.1)
HGB BLD-MCNC: 13.5 G/DL (ref 11.5–15.4)
HGB UR QL STRIP.AUTO: NEGATIVE
IMM GRANULOCYTES # BLD AUTO: 0.01 THOUSAND/UL (ref 0–0.2)
IMM GRANULOCYTES NFR BLD AUTO: 0 % (ref 0–2)
KETONES UR STRIP-MCNC: NEGATIVE MG/DL
LEUKOCYTE ESTERASE UR QL STRIP: NEGATIVE
LIPASE SERPL-CCNC: 18 U/L (ref 11–82)
LYMPHOCYTES # BLD AUTO: 1.5 THOUSANDS/ÂΜL (ref 0.6–4.47)
LYMPHOCYTES NFR BLD AUTO: 24 % (ref 14–44)
MCH RBC QN AUTO: 28.5 PG (ref 26.8–34.3)
MCHC RBC AUTO-ENTMCNC: 32.6 G/DL (ref 31.4–37.4)
MCV RBC AUTO: 88 FL (ref 82–98)
MONOCYTES # BLD AUTO: 0.81 THOUSAND/ÂΜL (ref 0.17–1.22)
MONOCYTES NFR BLD AUTO: 13 % (ref 4–12)
NEUTROPHILS # BLD AUTO: 3.81 THOUSANDS/ÂΜL (ref 1.85–7.62)
NEUTS SEG NFR BLD AUTO: 60 % (ref 43–75)
NITRITE UR QL STRIP: POSITIVE
NON-SQ EPI CELLS URNS QL MICRO: NORMAL /HPF
NRBC BLD AUTO-RTO: 0 /100 WBCS
PH UR STRIP.AUTO: 8 [PH]
PLATELET # BLD AUTO: 220 THOUSANDS/UL (ref 149–390)
PMV BLD AUTO: 9.9 FL (ref 8.9–12.7)
POTASSIUM SERPL-SCNC: 4.5 MMOL/L (ref 3.5–5.3)
PROT SERPL-MCNC: 7 G/DL (ref 6.4–8.4)
PROT UR STRIP-MCNC: NEGATIVE MG/DL
RBC # BLD AUTO: 4.73 MILLION/UL (ref 3.81–5.12)
RBC #/AREA URNS AUTO: NORMAL /HPF
SODIUM SERPL-SCNC: 138 MMOL/L (ref 135–147)
SP GR UR STRIP.AUTO: 1.01 (ref 1–1.03)
UROBILINOGEN UR QL STRIP.AUTO: 0.2 E.U./DL
WBC # BLD AUTO: 6.26 THOUSAND/UL (ref 4.31–10.16)
WBC #/AREA URNS AUTO: NORMAL /HPF

## 2023-10-03 PROCEDURE — 80053 COMPREHEN METABOLIC PANEL: CPT | Performed by: EMERGENCY MEDICINE

## 2023-10-03 PROCEDURE — 36415 COLL VENOUS BLD VENIPUNCTURE: CPT | Performed by: EMERGENCY MEDICINE

## 2023-10-03 PROCEDURE — 74177 CT ABD & PELVIS W/CONTRAST: CPT

## 2023-10-03 PROCEDURE — 83690 ASSAY OF LIPASE: CPT | Performed by: EMERGENCY MEDICINE

## 2023-10-03 PROCEDURE — 96365 THER/PROPH/DIAG IV INF INIT: CPT

## 2023-10-03 PROCEDURE — 90833 PSYTX W PT W E/M 30 MIN: CPT | Performed by: NURSE PRACTITIONER

## 2023-10-03 PROCEDURE — 85025 COMPLETE CBC W/AUTO DIFF WBC: CPT | Performed by: EMERGENCY MEDICINE

## 2023-10-03 PROCEDURE — 99285 EMERGENCY DEPT VISIT HI MDM: CPT | Performed by: EMERGENCY MEDICINE

## 2023-10-03 PROCEDURE — 81001 URINALYSIS AUTO W/SCOPE: CPT | Performed by: EMERGENCY MEDICINE

## 2023-10-03 PROCEDURE — 99284 EMERGENCY DEPT VISIT MOD MDM: CPT

## 2023-10-03 PROCEDURE — 96361 HYDRATE IV INFUSION ADD-ON: CPT

## 2023-10-03 PROCEDURE — 87086 URINE CULTURE/COLONY COUNT: CPT | Performed by: EMERGENCY MEDICINE

## 2023-10-03 PROCEDURE — 99214 OFFICE O/P EST MOD 30 MIN: CPT | Performed by: NURSE PRACTITIONER

## 2023-10-03 RX ORDER — CEPHALEXIN 500 MG/1
500 CAPSULE ORAL 2 TIMES DAILY
Qty: 10 CAPSULE | Refills: 0 | Status: SHIPPED | OUTPATIENT
Start: 2023-10-03 | End: 2023-10-08

## 2023-10-03 RX ORDER — QUETIAPINE FUMARATE 100 MG/1
100 TABLET, FILM COATED ORAL
Qty: 90 TABLET | Refills: 1 | Status: SHIPPED | OUTPATIENT
Start: 2023-10-03 | End: 2024-03-31

## 2023-10-03 RX ORDER — CEFTRIAXONE 1 G/50ML
1000 INJECTION, SOLUTION INTRAVENOUS ONCE
Status: COMPLETED | OUTPATIENT
Start: 2023-10-03 | End: 2023-10-03

## 2023-10-03 RX ADMIN — CEFTRIAXONE 1000 MG: 1 INJECTION, SOLUTION INTRAVENOUS at 22:47

## 2023-10-03 RX ADMIN — SODIUM CHLORIDE 1000 ML: 0.9 INJECTION, SOLUTION INTRAVENOUS at 19:59

## 2023-10-03 RX ADMIN — IOHEXOL 100 ML: 350 INJECTION, SOLUTION INTRAVENOUS at 20:55

## 2023-10-04 ENCOUNTER — NURSE TRIAGE (OUTPATIENT)
Age: 68
End: 2023-10-04

## 2023-10-04 DIAGNOSIS — K50.80 CROHN'S DISEASE OF BOTH SMALL AND LARGE INTESTINE WITHOUT COMPLICATION (HCC): Primary | ICD-10-CM

## 2023-10-04 NOTE — TELEPHONE ENCOUNTER
Keena Goodman called from pharmacy to advise recent Remicade order for 380 mg vs previous 371 mg. Previous authorization approved for lower dose. She will run through but wanted to update us in case new auth for higher dose may need to be obtained.

## 2023-10-05 ENCOUNTER — TELEPHONE (OUTPATIENT)
Dept: PSYCHIATRY | Facility: CLINIC | Age: 68
End: 2023-10-05

## 2023-10-05 ENCOUNTER — OFFICE VISIT (OUTPATIENT)
Dept: FAMILY MEDICINE CLINIC | Facility: CLINIC | Age: 68
End: 2023-10-05
Payer: COMMERCIAL

## 2023-10-05 VITALS
RESPIRATION RATE: 18 BRPM | SYSTOLIC BLOOD PRESSURE: 120 MMHG | BODY MASS INDEX: 27.99 KG/M2 | DIASTOLIC BLOOD PRESSURE: 78 MMHG | TEMPERATURE: 97.2 F | HEIGHT: 65 IN | HEART RATE: 100 BPM | WEIGHT: 168 LBS

## 2023-10-05 DIAGNOSIS — E78.2 MIXED HYPERLIPIDEMIA: ICD-10-CM

## 2023-10-05 DIAGNOSIS — R25.2 MUSCLE CRAMPING: Primary | ICD-10-CM

## 2023-10-05 PROCEDURE — 99213 OFFICE O/P EST LOW 20 MIN: CPT | Performed by: NURSE PRACTITIONER

## 2023-10-05 NOTE — PROGRESS NOTES
Assessment/Plan:    May be statin induced, will check labs as below and f/u with results    1. Muscle cramping  -     Magnesium; Future  -     CK; Future  -     Vitamin D 25 hydroxy; Future  -     Magnesium  -     CK  -     Vitamin D 25 hydroxy    2. Mixed hyperlipidemia  Assessment & Plan: Will have her hold her statin until labs are reviewed. There are no Patient Instructions on file for this visit. Return if symptoms worsen or fail to improve. Subjective:      Patient ID: uLis Alberto Ventura is a 80 y.o. female. Chief Complaint   Patient presents with   • Dysmenorrhea     Both legs and feet cramps at night lw cma   • Follow-up     Follow to er visit for bloating lw cma       For the past couple of nights, she has been having severe leg cramps. Toes are curling and legs are very painful. Takes Magnesium 400mg for her bowels      The following portions of the patient's history were reviewed and updated as appropriate: allergies, current medications, past family history, past medical history, past social history, past surgical history and problem list.    Review of Systems   Constitutional: Negative. Respiratory: Negative. Cardiovascular: Negative. Musculoskeletal: Positive for myalgias. Neurological: Negative.           Current Outpatient Medications   Medication Sig Dispense Refill   • acetaminophen (TYLENOL) 650 mg CR tablet Take 1 tablet (650 mg total) by mouth every 8 (eight) hours as needed for headaches, moderate pain or mild pain  0   • albuterol (ProAir HFA) 90 mcg/act inhaler Inhale 2 puffs every 6 (six) hours as needed for wheezing 8.5 g 0   • atorvastatin (LIPITOR) 20 mg tablet TAKE 1 TABLET BY MOUTH EVERY DAY 90 tablet 1   • budesonide (Pulmicort Flexhaler) 90 MCG/ACT inhaler Inhale 1 puff 2 (two) times a day 1 each 5   • Calcium Carb-Cholecalciferol 600-12.5 MG-MCG CAPS Take by mouth daily in the early morning     • calcium carbonate (OS-WILLY) 1250 (500 Ca) MG tablet Take 1,250 mg by mouth     • DULoxetine (CYMBALTA) 60 mg delayed release capsule Take 1 capsule (60 mg total) by mouth daily 90 capsule 1   • esomeprazole (NexIUM) 40 MG capsule Take 1 capsule (40 mg total) by mouth daily before breakfast 90 capsule 5   • gabapentin (NEURONTIN) 300 mg capsule Take 1 capsule (300 mg total) by mouth 3 (three) times a day 90 capsule 5   • levothyroxine 50 mcg tablet TAKE 1 TABLET BY MOUTH EVERY DAY 90 tablet 0   • Magnesium 400 MG TABS Take by mouth daily     • meloxicam (MOBIC) 7.5 mg tablet TAKE 1 TABLET BY MOUTH EVERY DAY 30 tablet 1   • midodrine (PROAMATINE) 10 MG tablet Take 1 tablet (10 mg total) by mouth 2 (two) times a day     • QUEtiapine (SEROquel) 100 mg tablet Take 1 tablet (100 mg total) by mouth daily at bedtime 90 tablet 1   • Sodium Fluoride 5000 PPM 1.1 % PSTE USE ONCE DAILY PREFERABLY AT NIGHT     • CVS Aspirin Adult Low Strength 81 MG EC tablet CHEW AND SWALLOW 1 TABLET BY MOUTH ONCE DAILY (Patient not taking: Reported on 10/5/2023)       No current facility-administered medications for this visit. Objective:    /78   Pulse 100   Temp (!) 97.2 °F (36.2 °C) (Temporal)   Resp 18   Ht 5' 5" (1.651 m)   Wt 76.2 kg (168 lb)   LMP  (LMP Unknown)   BMI 27.96 kg/m²        Physical Exam  Vitals and nursing note reviewed. Constitutional:       Appearance: She is well-developed. Cardiovascular:      Rate and Rhythm: Normal rate and regular rhythm. Heart sounds: Normal heart sounds. No murmur heard. Pulmonary:      Effort: Pulmonary effort is normal.      Breath sounds: Normal breath sounds. Skin:     General: Skin is warm and dry. Neurological:      Mental Status: She is alert.    Psychiatric:         Mood and Affect: Mood normal.         Behavior: Behavior normal.                SHRAVAN Eastman

## 2023-10-05 NOTE — TELEPHONE ENCOUNTER
Called Pt in regards to referral rcvd and to add Pt to proper wait list. No answer, lvm for Pt to call back.

## 2023-10-05 NOTE — BH TREATMENT PLAN
TREATMENT PLAN (Medication Management Only)         [facility]     Name and Date of Alcon Morrow sg.sandra 1955     Date of Treatment Plan: October 18, 2022, March 28, 2023 October 3, 2023     Diagnosis/Diagnoses:    1. Major depressive disorder, recurrent, in partial remission ([address])    2. Generalized anxiety disorder    3. Insomnia related to another mental disorder    4. Marital conflict          Strengths/Personal Resources for Self-Care: financial security, ability to express needs, motivation for treatment     Area/Areas of need (in own words): depression, gambling addiction, emotional insecurity  1.         Long Term Goal: continue to improve control of depression, ultimate goal no hospitalization  Target date: 4/3/2024  Person/Persons responsible for completion of goal: sarah Buckley     2.         Short Term Objective (s) - How will we reach this goal?:   A. Provider new recommended medication/dosage changes and/or continue medication(s):  Cymbalta, Seroquel  B. take medications as prescribed, attend scheduled appointments  C. See [facility] ([address]) for psychotherapy   Target date: 4/3/2024  Person/Persons Responsible for Completion of Goal: sarah Buckley      Progress Towards Goals: progressing     Treatment Modality: medication management every 12 weeks     Review due 180 days from date of this plan: 3/3/2024  Expected length of service: ongoing treatment  My Physician/PA/NP and I have developed this plan together and I agree to work on the goals and objectives. I understand the treatment goals that were developed for my treatment.

## 2023-10-05 NOTE — PSYCH
Visit Time    Visit Start Time: 5:30  Visit Stop Time: 6:00  Total Visit Duration: 30 minutes    Subjective:     Patient ID: Nereida Jones is a 80 y.o. female history of anxiety and depression and gambling problem seen for medication management and mood assessment. 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 This increased her anxiety, her boss was in [facility], at the time she had to help the girl pack and get on the bus to leave. She complained of right upper quadrant abdominal discomfort, reports she is drinking fluids and complained of not urinating much for 2 days. She is complains of leg foot cramps, frequent bruising. Takes medication as prescribed. 's frustrated due to limited finances in the home. She is trying to help out her sister with money which placed her bank account short. In addition she admits to gambling "a little".     HPI ROS Appetite Changes and Sleep: decreased appetite and decreased energy    Review Of Systems:     Mood Anxiety and Depression   Behavior Normal    Thought Content Normal   General Relationship Problems, Emotional Problems and Sleep Disturbances   Personality Normal   Other Psych Symptoms Normal   Constitutional Feeling Tired   ENT As Noted in HPI   Cardiovascular As Noted in HPI   Respiratory As Noted in HPI   Gastrointestinal As Noted in HPI   Genitourinary As Noted in HPI   Musculoskeletal As Noted in HPI   Integumentary As Noted in HPI   Neurological As Noted in HPI   Endocrine Normal    Other Symptoms        Laboratory Results:     Substance Abuse History:  Social History     Substance and Sexual Activity   Drug Use No       Family Psychiatric History:   Family History   Problem Relation Age of Onset   • Heart disease Mother    • Stroke Mother 58   • COPD Mother    • Arthritis Mother    • Hypertension Father    • Dislocations Sister    • Multiple sclerosis Sister    • Neurological problems Sister    • Scoliosis Sister    • No Known Problems Maternal Grandmother    • No Known Problems Maternal Grandfather    • No Known Problems Paternal Grandmother    • No Known Problems Paternal Grandfather    • Kidney disease Brother         kidney transplant   • No Known Problems Maternal Aunt    • No Known Problems Maternal Uncle    • No Known Problems Paternal Aunt    • No Known Problems Paternal Uncle    • ADD / ADHD Neg Hx    • Anesthesia problems Neg Hx    • Cancer Neg Hx    • Clotting disorder Neg Hx    • Collagen disease Neg Hx    • Diabetes Neg Hx    • Dislocations Neg Hx    • Learning disabilities Neg Hx    • Neurological problems Neg Hx    • Osteoporosis Neg Hx    • Rheumatologic disease Neg Hx    • Scoliosis Neg Hx    • Vascular Disease Neg Hx        The following portions of the patient's history were reviewed and updated as appropriate: allergies, current medications, past family history, past medical history, past social history, past surgical history and problem list.    Social History     Socioeconomic History   • Marital status: /Civil Union     Spouse name: Not on file   • Number of children: Not on file   • Years of education: Not on file   • Highest education level: Not on file   Occupational History   • Not on file   Tobacco Use   • Smoking status: Never   • Smokeless tobacco: Never   Vaping Use   • Vaping Use: Never used   Substance and Sexual Activity   • Alcohol use: Not Currently   • Drug use: No   • Sexual activity: Not Currently     Partners: Male   Other Topics Concern   • Not on file   Social History Narrative   • Not on file     Social Determinants of Health     Financial Resource Strain: Not on file   Food Insecurity: Not on file   Transportation Needs: No Transportation Needs (5/3/2023)    PRAPARE - Transportation    • Lack of Transportation (Medical): No    • Lack of Transportation (Non-Medical):  No   Physical Activity: Not on file   Stress: Not on file   Social Connections: Not on file Intimate Partner Violence: Not on file   Housing Stability: Unknown (5/3/2023)    Housing Stability Vital Sign    • Unable to Pay for Housing in the Last Year: No    • Number of Places Lived in the Last Year: Not on file    • Unstable Housing in the Last Year: No     Social History     Social History Narrative   • Not on file       Objective:       Mental status:  Appearance adequate hygiene and grooming, restless and fidgety and good eye contact    Mood anxious   Affect affect was tearful and affect appropriate    Speech a normal rate   Thought Processes normal thought processes   Hallucinations no hallucinations present    Thought Content no delusions   Abnormal Thoughts no suicidal thoughts  and no homicidal thoughts    Orientation  oriented to person   Remote Memory short term memory intact and long term memory intact   Attention Span concentration intact   Intellect Appears to be of Average Intelligence   Insight Insight intact   Judgement judgment was intact   Muscle Strength Muscle strength and tone were normal   Language no difficulty naming common objects   Fund of Knowledge displays adequate knowledge of current events   Pain moderate to severe   Pain Scale 4       Assessment/Plan:       Diagnoses and all orders for this visit:    Severe episode of recurrent major depressive disorder, without psychotic features ([address])    PTSD (post-traumatic stress disorder)    Primary insomnia    Major depressive disorder, recurrent, in partial remission ([facility])  -     QUEtiapine (SEROquel) 100 mg tablet;  Take 1 tablet (100 mg total) by mouth daily at bedtime    Anxiety and depression    Generalized anxiety disorder    Gambling disorder, moderate            Treatment Recommendations- Risks Benefits      Immediate Medical/Psychiatric/Psychotherapy Treatments and Any Precautions:  reviewed medication continue with treatment plan    Risks, Benefits And Possible Side Effects Of Medications:  {PSYCH RISK, BENEFITS AND POSSIBLE SIDE EFFECTS (Optional):50375       Psychotherapy Provided:30 min Individual psychotherapy provided.    Supportive therapy  Medication evaluation  Mood assessment  Encouraged to go to the ER for abdominal pain and limited urination  Treatment plan updated  Goals discussed in session: Maintain stable mood     Referral to therapy

## 2023-10-06 LAB — BACTERIA UR CULT: NORMAL

## 2023-10-10 LAB
25(OH)D3+25(OH)D2 SERPL-MCNC: 42.9 NG/ML (ref 30–100)
CK BB CFR SERPL ELPH: 0 %
CK MACRO1 CFR SERPL: 0 %
CK MACRO2 CFR SERPL: 0 %
CK MB CFR SERPL ELPH: 0 % (ref 0–3)
CK MM CFR SERPL ELPH: 100 % (ref 97–100)
CK SERPL-CCNC: 247 U/L (ref 32–182)
MAGNESIUM SERPL-MCNC: 2.3 MG/DL (ref 1.6–2.3)

## 2023-10-19 ENCOUNTER — OFFICE VISIT (OUTPATIENT)
Dept: GASTROENTEROLOGY | Facility: CLINIC | Age: 68
End: 2023-10-19
Payer: COMMERCIAL

## 2023-10-19 VITALS
HEIGHT: 65 IN | WEIGHT: 172 LBS | DIASTOLIC BLOOD PRESSURE: 102 MMHG | HEART RATE: 85 BPM | SYSTOLIC BLOOD PRESSURE: 158 MMHG | BODY MASS INDEX: 28.66 KG/M2

## 2023-10-19 DIAGNOSIS — R79.89 LFT ELEVATION: ICD-10-CM

## 2023-10-19 DIAGNOSIS — K50.80 CROHN'S DISEASE OF BOTH SMALL AND LARGE INTESTINE WITHOUT COMPLICATION (HCC): Primary | ICD-10-CM

## 2023-10-19 PROCEDURE — 99213 OFFICE O/P EST LOW 20 MIN: CPT | Performed by: INTERNAL MEDICINE

## 2023-10-19 NOTE — PROGRESS NOTES
[facility] - Outpatient Follow-up Note  yAsha Kat 80 y.o. female MRN: [id number]  Encounter: [id number]          ASSESSMENT AND PLAN:      1. Crohn's disease of both small and large intestine without complication ([address])  Seems to be in symptomatic remission on infliximab 5 mg/kg every 8 weeks though her most recent infusion was aborted custodial through due to loss of IV access. We will consider trying to obtain her next dose on a shorter time scale if possible. Has had DEV elevation though this has been present in the past and seems unlikely to suggest drug-induced lupus from infliximab. She will be seeing her rheumatologist in a week or 2 and will see what she thinks of this. Episode of abdominal pain and diarrhea recently sounds most likely to be functional.  If this recurs will likely check inflammatory markers    2. LFT elevation  DEV elevated but anti-smooth muscle and antimitochondrial antibodies normal.  LFTs remain elevated. No alcohol in more than 20 years. Will evaluate further as below. 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 She has been doing well but did have an episode of severe abdominal pain and diarrhea approximately 1 week ago which lasted 1 evening. No blood. Has been on infliximab though her last infusion was aborted due to losing IV access. LFTs were improving but have bumped up again with transaminases approximately 2 times upper limit of normal with normal bilirubin and alkaline phosphatase.   Has had an elevated DEV which is actually currently improved from several years ago      REVIEW OF SYSTEMS:    Review of Systems   HENT: Negative for sore throat. Cardiovascular:  Positive for chest pain. Respiratory:  Positive for shortness of breath. Skin:  Positive for rash. Historical Information   Past Medical History:   Diagnosis Date    Abdominal adhesions     Anesthesia complication     difficult to wake up    Anxiety     Arthritis     Cancer ([address])     melanoma    Colon polyp     Depression     Depression     Disease of thyroid gland     Fibromyalgia     Fibromyalgia, primary     Fractures 2006    GERD (gastroesophageal reflux disease)     History of cholecystectomy 09/07/2019    Hyperlipidemia     Irregular heart beat     can be tachy; also has orthoststic hypotension    Lazy eye     resolved: 3/27/17    Medical clearance for psychiatric admission 07/13/2021    Melanoma ([address]) 2010    Osteoarthritis 07/2018    Osteopenia     with joint pain-elevated DEV    Psychiatric disorder     Shortness of breath     assoc with tachycardia    Stomach disorder 1995    Tinnitus     Wears glasses     for reading     Past Surgical History:   Procedure Laterality Date    ABDOMINAL SURGERY      lysis of adhesions x 2    APPENDECTOMY      CERVICAL FUSION      May 5,2021 and Jan. 01,2020    CHOLECYSTECTOMY      open    COLONOSCOPY      DILATION AND CURETTAGE OF UTERUS      FOOT SURGERY  05/2017    NECK SURGERY  01/2019 5/2021    NEUROMA EXCISION Right 05/26/2017    Procedure: EXCISION MASS / FIBROMA FOOT;  Surgeon: Patricia Hays DPM;  Location: [facility];  Service:     PARS PLANA VITRECTOMY W/ REPAIR OF MACULAR HOLE  12/23/2020    UT XCAPSL CTRC RMVL INSJ IO LENS PROSTH W/O ECP Left 12/06/2021    Procedure: EXTRACTION EXTRACAPSULAR CATARACT PHACO INTRAOCULAR LENS (IOL);   Surgeon: Merlene Sosa MD;  Location: [facility] OR;  Service: Ophthalmology    RIGHT OOPHORECTOMY      WISDOM TOOTH EXTRACTION      x4     Social History   Social History     Substance and Sexual Activity   Alcohol Use Not Currently     Social History     Substance and Sexual Activity   Drug Use No     Social History     Tobacco Use   Smoking Status Never   Smokeless Tobacco Never     Family History   Problem Relation Age of Onset    Heart disease Mother     Stroke Mother 58    COPD Mother     Arthritis Mother     Hypertension Father     Dislocations Sister     Multiple sclerosis Sister     Neurological problems Sister     Scoliosis Sister     No Known Problems Maternal Grandmother     No Known Problems Maternal Grandfather     No Known Problems Paternal Grandmother     No Known Problems Paternal Grandfather     Kidney disease Brother         kidney transplant    No Known Problems Maternal Aunt     No Known Problems Maternal Uncle     No Known Problems Paternal Aunt     No Known Problems Paternal Uncle     ADD / ADHD Neg Hx     Anesthesia problems Neg Hx     Cancer Neg Hx     Clotting disorder Neg Hx     Collagen disease Neg Hx     Diabetes Neg Hx     Dislocations Neg Hx     Learning disabilities Neg Hx     Neurological problems Neg Hx     Osteoporosis Neg Hx     Rheumatologic disease Neg Hx     Scoliosis Neg Hx     Vascular Disease Neg Hx        Meds/Allergies       Current Outpatient Medications:     calcium carbonate (OS-WILLY) 1250 (500 Ca) MG tablet    DULoxetine (CYMBALTA) 60 mg delayed release capsule    esomeprazole (NexIUM) 40 MG capsule    gabapentin (NEURONTIN) 300 mg capsule    levothyroxine 50 mcg tablet    Magnesium 400 MG TABS    meloxicam (MOBIC) 7.5 mg tablet    midodrine (PROAMATINE) 10 MG tablet    QUEtiapine (SEROquel) 100 mg tablet    Sodium Fluoride 5000 PPM 1.1 % PSTE    acetaminophen (TYLENOL) 650 mg CR tablet    albuterol (ProAir HFA) 90 mcg/act inhaler    atorvastatin (LIPITOR) 20 mg tablet    budesonide (Pulmicort Flexhaler) 90 MCG/ACT inhaler    Calcium Carb-Cholecalciferol 600-12.5 MG-MCG CAPS    [employer] Aspirin Adult Low Strength 81 MG EC tablet    Allergies   Allergen Reactions    Meperidine Lightheadedness and Other (See Comments)    Sulfa Antibiotics Hives           Objective     Blood pressure (!) 158/102, pulse 85, height 5' 5" (1.651 m), weight 78 kg (172 lb). Body mass index is 28.62 kg/m². PHYSICAL EXAM:      Physical Exam  Vitals and nursing note reviewed. Constitutional:       General: She is not in acute distress. Appearance: She is not ill-appearing. HENT:      Head: Normocephalic and atraumatic. Eyes:      General: No scleral icterus. Extraocular Movements: Extraocular movements intact. Cardiovascular:      Rate and Rhythm: Normal rate and regular rhythm. Pulmonary:      Effort: Pulmonary effort is normal. No respiratory distress. Skin:     General: Skin is warm and dry. Coloration: Skin is not cyanotic. Findings: No erythema. Neurological:      General: No focal deficit present. Mental Status: She is alert and oriented to person, place, and time. Psychiatric:         Mood and Affect: Mood normal.         Behavior: Behavior normal.          Lab Results:   No visits with results within 1 Day(s) from this visit. Latest known visit with results is:   Office Visit on 10/05/2023   Component Date Value    Magnesium, Serum 10/06/2023 2.3     Creatine Kinase, Total 10/06/2023 247 (H)     25-HYDROXY VIT D 10/06/2023 42.9     Macro Type 2 10/06/2023 0     CK-MM 10/06/2023 100     Macro Type 1 10/06/2023 0     CK-MB 10/06/2023 0     CK-BB 10/06/2023 0          Radiology Results:   CT abdomen pelvis with contrast    Result Date: 10/3/2023  Narrative: CT ABDOMEN AND PELVIS WITH IV CONTRAST INDICATION:   abd pain. COMPARISON: CT abdomen and pelvis on 5/1/2023 and MRI abdomen on 7/25/2023. TECHNIQUE:  CT examination of the abdomen and pelvis was performed. Multiplanar 2D reformatted images were created from the source data.  This examination, like all CT scans performed in the [facility], was performed utilizing techniques to minimize radiation dose exposure, including the use of iterative reconstruction and automated exposure control. Radiation dose length product (DLP) for this visit:  599.4 mGy-cm IV Contrast:  100 mL of iohexol (OMNIPAQUE) Enteric Contrast:  Enteric contrast was not administered. FINDINGS: ABDOMEN LOWER CHEST:  No clinically significant abnormality identified in the visualized lower chest. LIVER/BILIARY TREE: Unremarkable. GALLBLADDER: Gallbladder is surgically absent. SPLEEN:  Unremarkable. PANCREAS: Stable appearance of multiple small pancreatic cysts, better demonstrated on prior MRI. ADRENAL GLANDS:  Unremarkable. KIDNEYS/URETERS: No hydronephrosis. STOMACH AND BOWEL: Duodenal and proximal jejunal small bowel diverticulosis. Scattered colonic diverticula. No evidence of acute diverticulitis. APPENDIX: Appendectomy. ABDOMINOPELVIC CAVITY:  No ascites. No pneumoperitoneum. No lymphadenopathy. VESSELS: Accessory renal artery and vein at the lower pole of bilateral kidneys unchanged. PELVIS REPRODUCTIVE ORGANS:  Unremarkable for patient's age. URINARY BLADDER:  Unremarkable. ABDOMINAL WALL/INGUINAL REGIONS:  Unremarkable. OSSEOUS STRUCTURES:  No acute fracture or destructive osseous lesion. Impression: No evidence of acute abnormality in the abdomen and pelvis. Colonic diverticulosis without evidence of acute diverticulitis. Stable appearance of multiple small pancreatic cysts, better depicted on prior MRI. Please see MRI for follow-up recommendations.  Workstation performed: [id number]   Answers submitted by the patient for this visit:  Inflammatory Bowel Disease (Submitted on 10/19/2023)  When you are not experiencing symptoms of your inflammatory bowel disease, how many bowel movements do you typically have each day?: 0-1  What is the average (typical) number of bowel movements that you had in a single day during the last week?: 0  Over the last 3 days, have you had any bowel movements where you passed blood without stool?: No  Since your last visit, have you received any vaccinations?: No  Since the last visit, have you had an infection?: No  Is there any possibility that you may be pregnant?: No  In the past three months, have you used tobacco in any form?: No  During the last year, how many days have you missed work or school because of your inflammatory bowel disease?: 10  During the last year, how many days have you been hospitalized because of your inflammatory bowel disease?: 5  During the last year, how many days have you visited a hospital emergency department because of your inflammatory bowel disease?: 3  During the last month, have you taken narcotic pain medications (such as Percocet, oxycodone, Oxycontin, morphine, Vicodin, Dilaudid, MS Contin) for your inflammatory bowel disease?: No  Have you awoken at night because you needed to move your bowels during the last month? : Yes  Have you had leakage of stool while sleeping during the last month?: No  During the last month, have you had incontinence of stool while you were awake?: Yes  In the last 6 months, have you unintentionally lost weight?: No  Fever: No  Eye irritation: No  Mouth sores: No  Numbness or tingling in your hands or feet: Yes  Pain or swelling in your joints: Yes  Bruising or bleeding: Yes  Felt depressed or blue:  Yes

## 2023-10-28 DIAGNOSIS — E03.8 OTHER SPECIFIED HYPOTHYROIDISM: ICD-10-CM

## 2023-10-30 RX ORDER — LEVOTHYROXINE SODIUM 0.05 MG/1
TABLET ORAL
Qty: 90 TABLET | Refills: 1 | Status: SHIPPED | OUTPATIENT
Start: 2023-10-30

## 2023-11-01 LAB — HCV AB SER-ACNC: NORMAL

## 2023-11-06 ENCOUNTER — TELEPHONE (OUTPATIENT)
Dept: NEUROLOGY | Facility: CLINIC | Age: 68
End: 2023-11-06

## 2023-11-07 ENCOUNTER — OFFICE VISIT (OUTPATIENT)
Dept: FAMILY MEDICINE CLINIC | Facility: CLINIC | Age: 68
End: 2023-11-07
Payer: COMMERCIAL

## 2023-11-07 ENCOUNTER — NURSE TRIAGE (OUTPATIENT)
Dept: OTHER | Facility: OTHER | Age: 68
End: 2023-11-07

## 2023-11-07 ENCOUNTER — APPOINTMENT (OUTPATIENT)
Dept: RADIOLOGY | Facility: CLINIC | Age: 68
End: 2023-11-07
Payer: COMMERCIAL

## 2023-11-07 VITALS
TEMPERATURE: 96.8 F | BODY MASS INDEX: 28.12 KG/M2 | WEIGHT: 169 LBS | SYSTOLIC BLOOD PRESSURE: 134 MMHG | DIASTOLIC BLOOD PRESSURE: 80 MMHG | HEART RATE: 72 BPM

## 2023-11-07 DIAGNOSIS — R25.2 CRAMPING OF HANDS: Primary | ICD-10-CM

## 2023-11-07 DIAGNOSIS — R05.1 ACUTE COUGH: ICD-10-CM

## 2023-11-07 PROCEDURE — 99214 OFFICE O/P EST MOD 30 MIN: CPT | Performed by: FAMILY MEDICINE

## 2023-11-07 PROCEDURE — 71046 X-RAY EXAM CHEST 2 VIEWS: CPT

## 2023-11-07 RX ORDER — METHYLPREDNISOLONE 4 MG/1
TABLET ORAL
Qty: 21 TABLET | Refills: 0 | Status: SHIPPED | OUTPATIENT
Start: 2023-11-07 | End: 2023-11-13 | Stop reason: CLARIF

## 2023-11-07 NOTE — TELEPHONE ENCOUNTER
Reason for Disposition  • MODERATE pain (e.g., interferes with normal activities) and present > 3 days    Answer Assessment - Initial Assessment Questions  1. ONSET: "When did the pain start?"      2 months ago   2. LOCATION: "Where is the pain located?"      Bilateral   3. PAIN: "How bad is the pain?" (Scale 1-10; or mild, moderate, severe)    - MILD (1-3): doesn't interfere with normal activities    - MODERATE (4-7): interferes with normal activities (e.g., work or school) or awakens from sleep    - SEVERE (8-10): excruciating pain, unable to use hand at all      7 out of 10   4. WORK OR EXERCISE: "Has there been any recent work or exercise that involved this part of the body?"      No   5. CAUSE: "What do you think is causing the pain?"      Dx of arthritis in other parts of the body. 6. AGGRAVATING FACTORS: "What makes the pain worse?" (e.g., using computer)      No specific factors   7. OTHER SYMPTOMS: "Do you have any other symptoms?" (e.g., neck pain, swelling, rash, numbness, fever)      Fever yesterday, redness   8.  PREGNANCY: "Is there any chance you are pregnant?" "When was your last menstrual period?"      N/A    Protocols used: Hand and Wrist Pain-ADULT-[place]

## 2023-11-07 NOTE — TELEPHONE ENCOUNTER
----- Message from Natasha Allen sent at 11/7/2023 10:55 AM EST -----  Patient was seen 10/5/2023 for cramping, for the last 3 days having a lot of cramping in hands and fingers (fingers are curling and unable to pry open) states in a lot of pain, unable to open hands, type, cramping a lot. States was on a phone call and got a severe pain/cramping in left thigh, pain is severe.  Would like a call [phone]

## 2023-11-07 NOTE — PROGRESS NOTES
Assessment/Plan:    No problem-specific Assessment & Plan notes found for this encounter. Ongoing cramping, worse lately  Despite stopping statin  Medrol trial, steroid risks aware  Keep f/u with rheumatology    Elevated LFTs  Suggest GI f/u    Right sided rhonchi  Check cxr     Diagnoses and all orders for this visit:    Cramping of hands  -     methylPREDNISolone 4 MG tablet therapy pack; Use as directed on package    Acute cough  -     XR chest pa & lateral; Future          Right lung rhonchi    Course of steroids for     Liver biopsy next?    elevatedCK,off statin for 1m    No follow-ups on file. Subjective:      Patient ID: Richard Oquendo is a 80 y.o. female. Chief Complaint   Patient presents with    Pain     Pain in both hands,  right leg has constant cramping, constant headache right side   HK CMA        HPI  Few weeks of symptoms as above  Stopped lipitor w/o help  Worse in am, especially with hands  Some headache right frontal area for a few weeks  Elevated LFTs noted  Saw gi, more tests ordered and results in her [facility] portal reviewed--normal, pt waiting for call back from gi regarding next plan    Seeing Dr Nicole Levy rheum [facility] next Friday  DEV positive  Oral steroids were tolerated in the past    Feverish yesterday  No temp recorded    CK elevated, been off statin since 10/6/23  DEV pos  Mg normal    The following portions of the patient's history were reviewed and updated as appropriate: allergies, current medications, past family history, past medical history, past social history, past surgical history and problem list.    Review of Systems   Musculoskeletal:  Positive for arthralgias and myalgias.          Current Outpatient Medications   Medication Sig Dispense Refill    acetaminophen (TYLENOL) 650 mg CR tablet Take 1 tablet (650 mg total) by mouth every 8 (eight) hours as needed for headaches, moderate pain or mild pain  0    albuterol (ProAir HFA) 90 mcg/act inhaler Inhale 2 puffs every 6 (six) hours as needed for wheezing 8.5 g 0    budesonide (Pulmicort Flexhaler) 90 MCG/ACT inhaler Inhale 1 puff 2 (two) times a day 1 each 5    Calcium Carb-Cholecalciferol 600-12.5 MG-MCG CAPS Take by mouth daily in the early morning      calcium carbonate (OS-WILLY) 1250 (500 Ca) MG tablet Take 1,250 mg by mouth      [employer] Aspirin Adult Low Strength 81 MG EC tablet       DULoxetine (CYMBALTA) 60 mg delayed release capsule Take 1 capsule (60 mg total) by mouth daily 90 capsule 1    esomeprazole (NexIUM) 40 MG capsule Take 1 capsule (40 mg total) by mouth daily before breakfast 90 capsule 5    gabapentin (NEURONTIN) 300 mg capsule Take 1 capsule (300 mg total) by mouth 3 (three) times a day 90 capsule 5    levothyroxine 50 mcg tablet TAKE 1 TABLET BY MOUTH EVERY DAY 90 tablet 1    Magnesium 400 MG TABS Take 800 mg by mouth daily      methylPREDNISolone 4 MG tablet therapy pack Use as directed on package 21 tablet 0    midodrine (PROAMATINE) 10 MG tablet Take 1 tablet (10 mg total) by mouth 2 (two) times a day      QUEtiapine (SEROquel) 100 mg tablet Take 1 tablet (100 mg total) by mouth daily at bedtime 90 tablet 1    Sodium Fluoride 5000 PPM 1.1 % PSTE USE ONCE DAILY PREFERABLY AT NIGHT      atorvastatin (LIPITOR) 20 mg tablet TAKE 1 TABLET BY MOUTH EVERY DAY (Patient not taking: Reported on 10/19/2023) 90 tablet 1    meloxicam (MOBIC) 7.5 mg tablet TAKE 1 TABLET BY MOUTH EVERY DAY 30 tablet 1     No current facility-administered medications for this visit. Objective:    /80   Pulse 72   Temp (!) 96.8 °F (36 °C)   Wt 76.7 kg (169 lb)   LMP  (LMP Unknown)   BMI 28.12 kg/m²        Physical Exam  Vitals and nursing note reviewed. Constitutional:       Appearance: She is well-developed. She is not ill-appearing. HENT:      Head: Normocephalic. Eyes:      General: No scleral icterus. Conjunctiva/sclera: Conjunctivae normal.   Cardiovascular:      Rate and Rhythm: Normal rate and regular rhythm. Heart sounds: No murmur heard. Pulmonary:      Effort: Pulmonary effort is normal. No respiratory distress. Breath sounds: No stridor. No wheezing or rales. Comments: Some right sided rhonchi mid lung field  Abdominal:      General: There is no distension. Palpations: Abdomen is soft. Tenderness: There is no abdominal tenderness. Musculoskeletal:         General: No swelling or deformity. Cervical back: Neck supple. Right lower leg: No edema. Left lower leg: No edema. Skin:     General: Skin is warm and dry. Coloration: Skin is not pale. Findings: No erythema. Neurological:      Mental Status: She is alert. Motor: No weakness. Gait: Gait normal.   Psychiatric:         Mood and Affect: Affect is tearful. Behavior: Behavior normal.         Thought Content:  Thought content normal.                Huel Slates, DO

## 2023-11-09 DIAGNOSIS — K50.80 CROHN'S DISEASE OF BOTH SMALL AND LARGE INTESTINE WITHOUT COMPLICATION (HCC): Primary | ICD-10-CM

## 2023-11-09 DIAGNOSIS — R79.89 LFT ELEVATION: ICD-10-CM

## 2023-11-13 ENCOUNTER — HOSPITAL ENCOUNTER (INPATIENT)
Facility: HOSPITAL | Age: 68
LOS: 2 days | Discharge: HOME | DRG: 387 | End: 2023-11-17
Attending: EMERGENCY MEDICINE | Admitting: INTERNAL MEDICINE
Payer: COMMERCIAL

## 2023-11-13 ENCOUNTER — APPOINTMENT (EMERGENCY)
Dept: CT IMAGING | Facility: HOSPITAL | Age: 68
DRG: 387 | End: 2023-11-13
Payer: COMMERCIAL

## 2023-11-13 ENCOUNTER — NURSE TRIAGE (OUTPATIENT)
Age: 68
End: 2023-11-13

## 2023-11-13 ENCOUNTER — TELEPHONE (OUTPATIENT)
Age: 68
End: 2023-11-13

## 2023-11-13 DIAGNOSIS — K50.80 CROHN'S DISEASE OF BOTH SMALL AND LARGE INTESTINE WITHOUT COMPLICATION (HCC): ICD-10-CM

## 2023-11-13 DIAGNOSIS — R06.00 DYSPNEA: ICD-10-CM

## 2023-11-13 DIAGNOSIS — R59.0 MEDIASTINAL LYMPHADENOPATHY: ICD-10-CM

## 2023-11-13 DIAGNOSIS — I95.1 ORTHOSTATIC HYPOTENSION: ICD-10-CM

## 2023-11-13 DIAGNOSIS — R10.9 ABDOMINAL PAIN: Primary | ICD-10-CM

## 2023-11-13 DIAGNOSIS — R74.01 TRANSAMINITIS: ICD-10-CM

## 2023-11-13 DIAGNOSIS — R10.13 EPIGASTRIC PAIN: ICD-10-CM

## 2023-11-13 DIAGNOSIS — K44.9 HIATAL HERNIA: ICD-10-CM

## 2023-11-13 DIAGNOSIS — R93.89 ABNORMAL CT OF THE CHEST: ICD-10-CM

## 2023-11-13 PROBLEM — R10.84 GENERALIZED ABDOMINAL PAIN: Status: ACTIVE | Noted: 2020-10-23

## 2023-11-13 PROBLEM — R06.02 SOB (SHORTNESS OF BREATH) ON EXERTION: Status: ACTIVE | Noted: 2023-11-13

## 2023-11-13 LAB
ALBUMIN SERPL BCP-MCNC: 4.1 G/DL (ref 3.5–5)
ALP SERPL-CCNC: 92 U/L (ref 34–104)
ALT SERPL W P-5'-P-CCNC: 73 U/L (ref 7–52)
ANION GAP SERPL CALCULATED.3IONS-SCNC: 8 MMOL/L
APTT PPP: 27 SECONDS (ref 23–37)
AST SERPL W P-5'-P-CCNC: 42 U/L (ref 13–39)
BACTERIA UR QL AUTO: ABNORMAL /HPF
BASOPHILS # BLD MANUAL: 0.1 THOUSAND/UL (ref 0–0.1)
BASOPHILS NFR MAR MANUAL: 1 % (ref 0–1)
BILIRUB SERPL-MCNC: 0.57 MG/DL (ref 0.2–1)
BILIRUB UR QL STRIP: NEGATIVE
BNP SERPL-MCNC: 14 PG/ML (ref 0–100)
BUN SERPL-MCNC: 18 MG/DL (ref 5–25)
CALCIUM SERPL-MCNC: 9.7 MG/DL (ref 8.4–10.2)
CHLORIDE SERPL-SCNC: 101 MMOL/L (ref 96–108)
CLARITY UR: CLEAR
CO2 SERPL-SCNC: 29 MMOL/L (ref 21–32)
COLOR UR: YELLOW
CREAT SERPL-MCNC: 0.9 MG/DL (ref 0.6–1.3)
EOSINOPHIL # BLD MANUAL: 0.1 THOUSAND/UL (ref 0–0.4)
EOSINOPHIL NFR BLD MANUAL: 1 % (ref 0–6)
ERYTHROCYTE [DISTWIDTH] IN BLOOD BY AUTOMATED COUNT: 14 % (ref 11.6–15.1)
FLUAV RNA RESP QL NAA+PROBE: NEGATIVE
FLUBV RNA RESP QL NAA+PROBE: NEGATIVE
GFR SERPL CREATININE-BSD FRML MDRD: 66 ML/MIN/1.73SQ M
GLUCOSE SERPL-MCNC: 92 MG/DL (ref 65–140)
GLUCOSE UR STRIP-MCNC: NEGATIVE MG/DL
HCT VFR BLD AUTO: 45.4 % (ref 34.8–46.1)
HGB BLD-MCNC: 14.4 G/DL (ref 11.5–15.4)
HGB UR QL STRIP.AUTO: NEGATIVE
HYALINE CASTS #/AREA URNS LPF: ABNORMAL /LPF
INR PPP: 0.93 (ref 0.84–1.19)
KETONES UR STRIP-MCNC: ABNORMAL MG/DL
LACTATE SERPL-SCNC: 1.3 MMOL/L (ref 0.5–2)
LEUKOCYTE ESTERASE UR QL STRIP: ABNORMAL
LIPASE SERPL-CCNC: 17 U/L (ref 11–82)
LYMPHOCYTES # BLD AUTO: 2.49 THOUSAND/UL (ref 0.6–4.47)
LYMPHOCYTES # BLD AUTO: 24 % (ref 14–44)
MAGNESIUM SERPL-MCNC: 2.1 MG/DL (ref 1.9–2.7)
MCH RBC QN AUTO: 28.2 PG (ref 26.8–34.3)
MCHC RBC AUTO-ENTMCNC: 31.7 G/DL (ref 31.4–37.4)
MCV RBC AUTO: 89 FL (ref 82–98)
MONOCYTES # BLD AUTO: 0.83 THOUSAND/UL (ref 0–1.22)
MONOCYTES NFR BLD: 8 % (ref 4–12)
MUCOUS THREADS UR QL AUTO: ABNORMAL
NEUTROPHILS # BLD MANUAL: 6.84 THOUSAND/UL (ref 1.85–7.62)
NEUTS BAND NFR BLD MANUAL: 1 % (ref 0–8)
NEUTS SEG NFR BLD AUTO: 65 % (ref 43–75)
NITRITE UR QL STRIP: NEGATIVE
NON-SQ EPI CELLS URNS QL MICRO: ABNORMAL /HPF
PH UR STRIP.AUTO: 5 [PH]
PLATELET # BLD AUTO: 281 THOUSANDS/UL (ref 149–390)
PLATELET BLD QL SMEAR: ADEQUATE
PMV BLD AUTO: 9.7 FL (ref 8.9–12.7)
POTASSIUM SERPL-SCNC: 4.1 MMOL/L (ref 3.5–5.3)
PROCALCITONIN SERPL-MCNC: <0.05 NG/ML
PROT SERPL-MCNC: 7.3 G/DL (ref 6.4–8.4)
PROT UR STRIP-MCNC: ABNORMAL MG/DL
PROTHROMBIN TIME: 13 SECONDS (ref 11.6–14.5)
RBC # BLD AUTO: 5.1 MILLION/UL (ref 3.81–5.12)
RBC #/AREA URNS AUTO: ABNORMAL /HPF
RBC MORPH BLD: NORMAL
RSV RNA RESP QL NAA+PROBE: NEGATIVE
SARS-COV-2 RNA RESP QL NAA+PROBE: NEGATIVE
SODIUM SERPL-SCNC: 138 MMOL/L (ref 135–147)
SP GR UR STRIP.AUTO: 1.03 (ref 1–1.03)
TSH SERPL DL<=0.05 MIU/L-ACNC: 3.76 UIU/ML (ref 0.45–4.5)
UROBILINOGEN UR STRIP-ACNC: 2 MG/DL
WBC # BLD AUTO: 10.37 THOUSAND/UL (ref 4.31–10.16)
WBC #/AREA URNS AUTO: ABNORMAL /HPF

## 2023-11-13 PROCEDURE — 99284 EMERGENCY DEPT VISIT MOD MDM: CPT

## 2023-11-13 PROCEDURE — G1004 CDSM NDSC: HCPCS

## 2023-11-13 PROCEDURE — 93005 ELECTROCARDIOGRAM TRACING: CPT

## 2023-11-13 PROCEDURE — 85007 BL SMEAR W/DIFF WBC COUNT: CPT

## 2023-11-13 PROCEDURE — 36415 COLL VENOUS BLD VENIPUNCTURE: CPT

## 2023-11-13 PROCEDURE — 87040 BLOOD CULTURE FOR BACTERIA: CPT

## 2023-11-13 PROCEDURE — 96374 THER/PROPH/DIAG INJ IV PUSH: CPT

## 2023-11-13 PROCEDURE — 83735 ASSAY OF MAGNESIUM: CPT

## 2023-11-13 PROCEDURE — 85027 COMPLETE CBC AUTOMATED: CPT

## 2023-11-13 PROCEDURE — 96361 HYDRATE IV INFUSION ADD-ON: CPT

## 2023-11-13 PROCEDURE — 84145 PROCALCITONIN (PCT): CPT

## 2023-11-13 PROCEDURE — 85730 THROMBOPLASTIN TIME PARTIAL: CPT

## 2023-11-13 PROCEDURE — 99285 EMERGENCY DEPT VISIT HI MDM: CPT | Performed by: EMERGENCY MEDICINE

## 2023-11-13 PROCEDURE — 85610 PROTHROMBIN TIME: CPT

## 2023-11-13 PROCEDURE — 99223 1ST HOSP IP/OBS HIGH 75: CPT | Performed by: INTERNAL MEDICINE

## 2023-11-13 PROCEDURE — 0241U HB NFCT DS VIR RESP RNA 4 TRGT: CPT

## 2023-11-13 PROCEDURE — 80053 COMPREHEN METABOLIC PANEL: CPT

## 2023-11-13 PROCEDURE — 96375 TX/PRO/DX INJ NEW DRUG ADDON: CPT

## 2023-11-13 PROCEDURE — 84443 ASSAY THYROID STIM HORMONE: CPT

## 2023-11-13 PROCEDURE — 81001 URINALYSIS AUTO W/SCOPE: CPT

## 2023-11-13 PROCEDURE — 74177 CT ABD & PELVIS W/CONTRAST: CPT

## 2023-11-13 PROCEDURE — 83880 ASSAY OF NATRIURETIC PEPTIDE: CPT

## 2023-11-13 PROCEDURE — 83690 ASSAY OF LIPASE: CPT

## 2023-11-13 PROCEDURE — 71275 CT ANGIOGRAPHY CHEST: CPT

## 2023-11-13 PROCEDURE — 83605 ASSAY OF LACTIC ACID: CPT

## 2023-11-13 RX ORDER — DICYCLOMINE HCL 20 MG
20 TABLET ORAL
Status: DISCONTINUED | OUTPATIENT
Start: 2023-11-13 | End: 2023-11-16

## 2023-11-13 RX ORDER — PANTOPRAZOLE SODIUM 40 MG/10ML
40 INJECTION, POWDER, LYOPHILIZED, FOR SOLUTION INTRAVENOUS
Status: DISCONTINUED | OUTPATIENT
Start: 2023-11-14 | End: 2023-11-17 | Stop reason: HOSPADM

## 2023-11-13 RX ORDER — HYDROMORPHONE HCL/PF 1 MG/ML
0.5 SYRINGE (ML) INJECTION ONCE
Status: DISCONTINUED | OUTPATIENT
Start: 2023-11-13 | End: 2023-11-13

## 2023-11-13 RX ORDER — LANOLIN ALCOHOL/MO/W.PET/CERES
400 CREAM (GRAM) TOPICAL DAILY
Status: DISCONTINUED | OUTPATIENT
Start: 2023-11-14 | End: 2023-11-17 | Stop reason: HOSPADM

## 2023-11-13 RX ORDER — KETOROLAC TROMETHAMINE 30 MG/ML
15 INJECTION, SOLUTION INTRAMUSCULAR; INTRAVENOUS EVERY 6 HOURS PRN
Status: DISCONTINUED | OUTPATIENT
Start: 2023-11-13 | End: 2023-11-15

## 2023-11-13 RX ORDER — ONDANSETRON 2 MG/ML
4 INJECTION INTRAMUSCULAR; INTRAVENOUS ONCE
Status: COMPLETED | OUTPATIENT
Start: 2023-11-13 | End: 2023-11-13

## 2023-11-13 RX ORDER — HYDROMORPHONE HCL IN WATER/PF 6 MG/30 ML
0.2 PATIENT CONTROLLED ANALGESIA SYRINGE INTRAVENOUS ONCE
Status: COMPLETED | OUTPATIENT
Start: 2023-11-13 | End: 2023-11-13

## 2023-11-13 RX ORDER — ACETAMINOPHEN 325 MG/1
650 TABLET ORAL EVERY 6 HOURS PRN
Status: DISCONTINUED | OUTPATIENT
Start: 2023-11-13 | End: 2023-11-14

## 2023-11-13 RX ORDER — GABAPENTIN 300 MG/1
300 CAPSULE ORAL 3 TIMES DAILY
Status: DISCONTINUED | OUTPATIENT
Start: 2023-11-13 | End: 2023-11-17 | Stop reason: HOSPADM

## 2023-11-13 RX ORDER — HYDROMORPHONE HCL/PF 1 MG/ML
0.5 SYRINGE (ML) INJECTION ONCE
Status: COMPLETED | OUTPATIENT
Start: 2023-11-13 | End: 2023-11-13

## 2023-11-13 RX ORDER — ENOXAPARIN SODIUM 100 MG/ML
40 INJECTION SUBCUTANEOUS DAILY
Status: DISCONTINUED | OUTPATIENT
Start: 2023-11-14 | End: 2023-11-17 | Stop reason: HOSPADM

## 2023-11-13 RX ORDER — QUETIAPINE FUMARATE 100 MG/1
100 TABLET, FILM COATED ORAL
Status: DISCONTINUED | OUTPATIENT
Start: 2023-11-13 | End: 2023-11-17 | Stop reason: HOSPADM

## 2023-11-13 RX ORDER — SODIUM CHLORIDE 9 MG/ML
100 INJECTION, SOLUTION INTRAVENOUS CONTINUOUS
Status: DISCONTINUED | OUTPATIENT
Start: 2023-11-13 | End: 2023-11-17 | Stop reason: HOSPADM

## 2023-11-13 RX ORDER — DULOXETIN HYDROCHLORIDE 60 MG/1
60 CAPSULE, DELAYED RELEASE ORAL DAILY
Status: DISCONTINUED | OUTPATIENT
Start: 2023-11-14 | End: 2023-11-17 | Stop reason: HOSPADM

## 2023-11-13 RX ORDER — PANTOPRAZOLE SODIUM 40 MG/1
40 TABLET, DELAYED RELEASE ORAL
Status: DISCONTINUED | OUTPATIENT
Start: 2023-11-14 | End: 2023-11-13

## 2023-11-13 RX ORDER — MIDODRINE HYDROCHLORIDE 5 MG/1
10 TABLET ORAL DAILY
Status: DISCONTINUED | OUTPATIENT
Start: 2023-11-14 | End: 2023-11-17 | Stop reason: HOSPADM

## 2023-11-13 RX ORDER — LEVOTHYROXINE SODIUM 0.05 MG/1
50 TABLET ORAL DAILY
Status: DISCONTINUED | OUTPATIENT
Start: 2023-11-14 | End: 2023-11-17 | Stop reason: HOSPADM

## 2023-11-13 RX ADMIN — DICYCLOMINE HYDROCHLORIDE 20 MG: 20 TABLET ORAL at 21:43

## 2023-11-13 RX ADMIN — SODIUM CHLORIDE 1000 ML: 0.9 INJECTION, SOLUTION INTRAVENOUS at 11:41

## 2023-11-13 RX ADMIN — HYDROMORPHONE HYDROCHLORIDE 0.5 MG: 1 INJECTION, SOLUTION INTRAMUSCULAR; INTRAVENOUS; SUBCUTANEOUS at 15:39

## 2023-11-13 RX ADMIN — ONDANSETRON 4 MG: 2 INJECTION INTRAMUSCULAR; INTRAVENOUS at 11:40

## 2023-11-13 RX ADMIN — GABAPENTIN 300 MG: 300 CAPSULE ORAL at 21:43

## 2023-11-13 RX ADMIN — QUETIAPINE FUMARATE 100 MG: 100 TABLET ORAL at 21:43

## 2023-11-13 RX ADMIN — SODIUM CHLORIDE 100 ML/HR: 0.9 INJECTION, SOLUTION INTRAVENOUS at 18:57

## 2023-11-13 RX ADMIN — IOHEXOL 100 ML: 350 INJECTION, SOLUTION INTRAVENOUS at 13:40

## 2023-11-13 RX ADMIN — KETOROLAC TROMETHAMINE 15 MG: 30 INJECTION, SOLUTION INTRAMUSCULAR; INTRAVENOUS at 18:57

## 2023-11-13 RX ADMIN — HYDROMORPHONE HYDROCHLORIDE 0.2 MG: 0.2 INJECTION, SOLUTION INTRAMUSCULAR; INTRAVENOUS; SUBCUTANEOUS at 11:40

## 2023-11-13 NOTE — ASSESSMENT & PLAN NOTE
Home regimen Levothyroxine 50mcg QD   Recent symptoms of multiple loose stools over the weekend  TSH normal    Plan:  Continue home regimen

## 2023-11-13 NOTE — H&P
[facility]  H&P  Name: Dennis Day 80 y.o. female I MRN: [id number]  Unit/Bed#: -[facility] I Date of Admission: 11/13/2023   Date of Service: 11/13/2023 I Hospital Day: 0      Assessment/Plan   * Generalized abdominal pain  Assessment & Plan  DDx including but not limited to: Crohn's Flare, gastroenteritis, gastritis, PUD, GERD, gastroparesis, hepatitis, pancreatitis, colitis, enteritis, food poisoning, mesenteric adenitis, IBD, IBS, ileus, bowel obstruction, cholecystitis, biliary colic, choledocholithiasis, constipation  Hx of Crohns on Infliximab infusion  Last infusion 9/29/23, unable to complete entire infusion 2/2 loss of IV access   Over the weekend had more than 4 episodes of loose stools, unusual for patient  CTA PE w/ AP- No intraabdominal process seen       Plan-   CLD for now until able to tolerate   Fecal Calprotectin  Covid  Stool Enteric Panel  Ova and Parasite   C Diff   Toradol, Tylenol, and Bentyl  Appreciate Gastroenterology recommendations     SOB (shortness of breath) on exertion  Assessment & Plan  DDx including but not limited to: Cardiac etiology, URI, bronchitis, bronchiolitis, pneumonia, viral syndrome, COVID 19, aspiration, asthma  Complaining of BLACK, non productive cough, and inability to lie flat   Saturing well on RA   CTA PE Study- Negative for PE, mosaic pattern in the lung parenchyma concerning for small vessel/ small airway disease  Per PCP-patient has been prescribing nebulized treatments and inhalers for this intermittent episodes of SOB    Plan-   ECHO in the setting of Infliximab infusions for possible new heart failure  Consider Pulmonology consult for CT lung findings   BNP- Pending         Adult hypothyroidism  Assessment & Plan  Home regimen includes Levothyroxine 50mcg QD   Recent symptoms of multiple loose stools over the weekend  No other hyper or hypothyroidism symptoms    Plan-  TSH is pending  Continue home regimen    Orthostatic hypotension  Assessment & Plan  Home regimen includes Midodrine 10mg BID     Plan-  Will change to QD in setting of volume depletion and pain        VTE Pharmacologic Prophylaxis: VTE Score: 3 Moderate Risk (Score 3-4) - Pharmacological DVT Prophylaxis Ordered: enoxaparin (Lovenox). Code Status: Level 1 - Full Code   Discussion with family: Updated  () via phone. Anticipated Length of Stay: Patient will be admitted on an observation basis with an anticipated length of stay of less than 2 midnights secondary to abdominal pain. Chief Complaint: Abdominal pain and shortness of breath    History of Present Illness:  Aysha Kat is a 80 y.o. female with a PMH of Crohn's disease on infliximab infusions, hypothyroidism, GERD, hypotension, JIMÉNEZ, who presents with generalized abdominal pain describes as a burning sensation initially starting on Saturday however has progressively worsened which prompted her to come to the ED. Patient also endorses that she has had increase in her stool output, notes that she is having more than 4 episodes of loose stools which is not her baseline as well as some nausea. She has not been able to tolerate PO intake due to pain and nausea. Patient notes that her last infliximab infusion was on 9/29 and this was incomplete as she had lost IV access throughout the infusion. Patient is also complaining of shortness of breath with exertion that has been going on for quite some time however has progressively worsened in the setting of this abdominal pain. She endorses that she is unable to lie flat. Patient denies fever, chills, CP, palpitations, current nausea, vomiting, hematemesis, hematochezia, leg swelling, or any other symptoms at this time.     ED-  Vitals afebrile, 120, 122/71, 98% on room air  CBC overall unremarkable  CMP electrolytes unremarkable AST 42, ALT 73, ALP 92, T. bili 0.57  Lipase 17  Lactate 1.3  Pro-Valdez-negative  CTA PE with AP-mosaic pattern in the lung parenchyma with small vessel and small airway disease, no PE  Interventions thus far Dilaudid, Zofran, 1L NS Bolus   Admission for generalized abdominal pain    Review of Systems:  Review of Systems   Constitutional:  Negative for chills and fever. HENT:  Negative for congestion, ear pain and sore throat. Eyes:  Negative for pain and visual disturbance. Respiratory:  Positive for shortness of breath. Negative for cough. Cardiovascular:  Negative for chest pain, palpitations and leg swelling. Gastrointestinal:  Positive for abdominal pain, diarrhea and nausea. Negative for blood in stool, constipation and vomiting. Endocrine: Negative for cold intolerance and heat intolerance. Genitourinary:  Negative for difficulty urinating, dysuria, enuresis, frequency, hematuria and urgency. Musculoskeletal:  Negative for arthralgias, back pain, joint swelling and myalgias. Skin:  Negative for color change and rash. Neurological:  Negative for dizziness, tremors, seizures, syncope, weakness, light-headedness, numbness and headaches. Psychiatric/Behavioral:  Negative for agitation and decreased concentration. All other systems reviewed and are negative.       Past Medical and Surgical History:   Past Medical History:   Diagnosis Date    Abdominal adhesions     Anesthesia complication     difficult to wake up    Anxiety     Arthritis     Cancer ([address])     melanoma    Colon polyp     Depression     Depression     Disease of thyroid gland     Fibromyalgia     Fibromyalgia, primary     Fractures 2006    GERD (gastroesophageal reflux disease)     History of cholecystectomy 09/07/2019    Hyperlipidemia     Irregular heart beat     can be tachy; also has orthoststic hypotension    Lazy eye     resolved: 3/27/17    Medical clearance for psychiatric admission 07/13/2021    Melanoma ([address]) 2010    Osteoarthritis 07/2018    Osteopenia     with joint pain-elevated DEV    Psychiatric disorder     Shortness of breath     assoc with tachycardia    Stomach disorder 1995    Tinnitus     Wears glasses     for reading       Past Surgical History:   Procedure Laterality Date    ABDOMINAL SURGERY      lysis of adhesions x 2    APPENDECTOMY      CERVICAL FUSION      May 5,2021 and Jan. 01,2020    CHOLECYSTECTOMY      open    COLONOSCOPY      DILATION AND CURETTAGE OF UTERUS      FOOT SURGERY  05/2017    NECK SURGERY  01/2019 5/2021    NEUROMA EXCISION Right 05/26/2017    Procedure: EXCISION MASS / FIBROMA FOOT;  Surgeon: Yohana Carlson DPM;  Location: [facility];  Service:     PARS PLANA VITRECTOMY W/ REPAIR OF MACULAR HOLE  12/23/2020    WV XCAPSL CTRC RMVL INSJ IO LENS PROSTH W/O ECP Left 12/06/2021    Procedure: EXTRACTION EXTRACAPSULAR CATARACT PHACO INTRAOCULAR LENS (IOL); Surgeon: Cj Stephen MD;  Location: [facility] MAIN OR;  Service: Ophthalmology    RIGHT OOPHORECTOMY      WISDOM TOOTH EXTRACTION      x4       Meds/Allergies:  Prior to Admission medications    Medication Sig Start Date End Date Taking?  Authorizing Provider   acetaminophen (TYLENOL) 650 mg CR tablet Take 1 tablet (650 mg total) by mouth every 8 (eight) hours as needed for headaches, moderate pain or mild pain 5/6/23  Yes Faith Campbell, DO   albuterol (ProAir HFA) 90 mcg/act inhaler Inhale 2 puffs every 6 (six) hours as needed for wheezing 5/21/22  Yes Carmen Scanlon, DO   budesonide (Pulmicort Flexhaler) 90 MCG/ACT inhaler Inhale 1 puff 2 (two) times a day 5/1/23  Yes Hay Poe MD   Calcium Carb-Cholecalciferol 600-12.5 MG-MCG CAPS Take by mouth daily in the early morning   Yes Historical Provider, MD   calcium carbonate (OS-WILLY) 1250 (500 Ca) MG tablet Take 1,250 mg by mouth   Yes Historical Provider, MD   [facility] Aspirin Adult Low Strength 81 MG EC tablet  11/4/22  Yes Historical Provider, MD   DULoxetine (CYMBALTA) 60 mg delayed release capsule Take 1 capsule (60 mg total) by mouth daily 8/1/23  Yes Chip Kent, PhD   esomeprazole (NexIUM) 40 MG capsule Take 1 capsule (40 mg total) by mouth daily before breakfast 12/9/22  Yes Gavin Mendieta MD   gabapentin (NEURONTIN) 300 mg capsule Take 1 capsule (300 mg total) by mouth 3 (three) times a day 5/17/23  Yes SHRAVAN Smith   levothyroxine 50 mcg tablet TAKE 1 TABLET BY MOUTH EVERY DAY 10/30/23  Yes SHRAVAN Crenshaw   Magnesium 400 MG TABS Take 800 mg by mouth daily   Yes Historical Provider, MD   midodrine (PROAMATINE) 10 MG tablet Take 1 tablet (10 mg total) by mouth 2 (two) times a day 5/6/23  Yes Shawn Herrera, DO   QUEtiapine (SEROquel) 100 mg tablet Take 1 tablet (100 mg total) by mouth daily at bedtime 10/3/23 3/31/24 Yes Contreras Laird, PhD   Sodium Fluoride 5000 PPM 1.1 % PSTE USE ONCE DAILY PREFERABLY AT NIGHT 4/4/23  Yes Historical Provider, MD   atorvastatin (LIPITOR) 20 mg tablet TAKE 1 TABLET BY MOUTH EVERY DAY  Patient not taking: Reported on 10/19/2023 6/13/23 11/13/23  Carrie Velez MD   meloxicam (MOBIC) 7.5 mg tablet TAKE 1 TABLET BY MOUTH EVERY DAY 9/25/23 11/13/23  Madelaine Saenz PA-C   methylPREDNISolone 4 MG tablet therapy pack Use as directed on package 11/7/23 11/13/23  Lito Aguilar DO     I have reviewed home medications with patient personally. Allergies:    Allergies   Allergen Reactions    Meperidine Lightheadedness and Other (See Comments)    Sulfa Antibiotics Hives       Social History:  Marital Status: /Civil Union   Occupation: Education   Patient Pre-hospital Living Situation: Home  Patient Pre-hospital Level of Mobility: walks  Patient Pre-hospital Diet Restrictions: None  Substance Use History:   Social History     Substance and Sexual Activity   Alcohol Use Not Currently     Social History     Tobacco Use   Smoking Status Never   Smokeless Tobacco Never     Social History     Substance and Sexual Activity   Drug Use No       Family History:  Family History   Problem Relation Age of Onset    Heart disease Mother     Stroke Mother 58    COPD Mother     Arthritis Mother     Hypertension Father     Dislocations Sister     Multiple sclerosis Sister     Neurological problems Sister     Scoliosis Sister     No Known Problems Maternal Grandmother     No Known Problems Maternal Grandfather     No Known Problems Paternal Grandmother     No Known Problems Paternal Grandfather     Kidney disease Brother         kidney transplant    No Known Problems Maternal Aunt     No Known Problems Maternal Uncle     No Known Problems Paternal Aunt     No Known Problems Paternal Uncle     ADD / ADHD Neg Hx     Anesthesia problems Neg Hx     Cancer Neg Hx     Clotting disorder Neg Hx     Collagen disease Neg Hx     Diabetes Neg Hx     Dislocations Neg Hx     Learning disabilities Neg Hx     Neurological problems Neg Hx     Osteoporosis Neg Hx     Rheumatologic disease Neg Hx     Scoliosis Neg Hx     Vascular Disease Neg Hx        Physical Exam:     Vitals:   Blood Pressure: 155/89 (11/13/23 1753)  Pulse: 80 (11/13/23 1753)  Temperature: 97.6 °F (36.4 °C) (11/13/23 1753)  Temp Source: Oral (11/13/23 1753)  Respirations: 18 (11/13/23 1753)  Height: 5' 5" (165.1 cm) (11/13/23 1753)  Weight - Scale: 78 kg (171 lb 15.3 oz) (11/13/23 1052)  SpO2: 99 % (11/13/23 1753)    Physical Exam  Vitals and nursing note reviewed. Constitutional:       General: She is not in acute distress. Appearance: She is well-developed. She is ill-appearing. She is not toxic-appearing or diaphoretic. HENT:      Head: Normocephalic and atraumatic. Mouth/Throat:      Mouth: Mucous membranes are dry. Eyes:      Extraocular Movements: Extraocular movements intact. Conjunctiva/sclera: Conjunctivae normal.   Cardiovascular:      Rate and Rhythm: Normal rate and regular rhythm. Pulses: Normal pulses. Heart sounds: Normal heart sounds. No murmur heard. No friction rub. No gallop. Pulmonary:      Effort: Pulmonary effort is normal. No respiratory distress. Comments: Expiratory wheezes bilaterally, crackles at the bases   Abdominal:      General: Bowel sounds are normal. There is no distension. Palpations: Abdomen is soft. Tenderness: There is abdominal tenderness. There is no guarding or rebound. Musculoskeletal:         General: No tenderness. Normal range of motion. Cervical back: Normal range of motion and neck supple. Right lower leg: No edema. Left lower leg: No edema. Skin:     General: Skin is warm and dry. Capillary Refill: Capillary refill takes less than 2 seconds. Neurological:      General: No focal deficit present. Mental Status: She is alert and oriented to person, place, and time. Cranial Nerves: No cranial nerve deficit. Sensory: No sensory deficit. Motor: No weakness. Coordination: Coordination normal.   Psychiatric:         Mood and Affect: Mood normal.         Behavior: Behavior normal.         Thought Content:  Thought content normal.         Additional Data:     Lab Results:  Results from last 7 days   Lab Units 11/13/23  1123   WBC Thousand/uL 10.37*   HEMOGLOBIN g/dL 14.4   HEMATOCRIT % 45.4   PLATELETS Thousands/uL 281   BANDS PCT % 1   LYMPHO PCT % 24   MONO PCT % 8   EOS PCT % 1     Results from last 7 days   Lab Units 11/13/23  1123   SODIUM mmol/L 138   POTASSIUM mmol/L 4.1   CHLORIDE mmol/L 101   CO2 mmol/L 29   BUN mg/dL 18   CREATININE mg/dL 0.90   ANION GAP mmol/L 8   CALCIUM mg/dL 9.7   ALBUMIN g/dL 4.1   TOTAL BILIRUBIN mg/dL 0.57   ALK PHOS U/L 92   ALT U/L 73*   AST U/L 42*   GLUCOSE RANDOM mg/dL 92     Results from last 7 days   Lab Units 11/13/23  1138   INR  0.93             Results from last 7 days   Lab Units 11/13/23  1138 11/13/23  1123   LACTIC ACID mmol/L 1.3  --    PROCALCITONIN ng/ml  --  <0.05       Lines/Drains:  Invasive Devices       Peripheral Intravenous Line  Duration             Peripheral IV 11/13/23 Right Antecubital <1 day Imaging: Reviewed radiology reports from this admission including: chest CT scan and abdominal/pelvic CT  PE Study with CT abdomen & pelvis with contrast   Final Result by Aracelis Lockwood MD (11/13 1429)      1. No filling defect to suggest acute or chronic pulmonary embolism in the entire pulmonary arterial system. 2.  Suggestion of mosaic lung parenchymal attenuation compatible with mild small vessel/small airways disease. 3.  No acute findings in the abdomen or pelvis. Workstation performed: [id number]             EKG and Other Studies Reviewed on Admission:   EKG: NSR 89    ** Please Note: This note has been constructed using a voice recognition system.  **

## 2023-11-13 NOTE — Clinical Note
Dennis Day was seen and treated in our emergency department on 11/13/2023. Diagnosis:     Daryl Palacios  may return to work on return date. She may return on this date: 11/15/2023    Can return sooner if feeling ok     If you have any questions or concerns, please don't hesitate to call.       Maribell Mota MD    ______________________________           _______________          _______________  Hospital Representative                              Date                                Time

## 2023-11-13 NOTE — ASSESSMENT & PLAN NOTE
Presented with generalized burning abdominal pain, nausea, and loose stools. Diagnosed with Crohn's in May 2023 - treated with Infliximab infusions every 8 weeks.  Last infusion 9/29, incomplete dose due to loss of IV access   Pt now describes constant dull, achey pain in RLQ   Reports many loose stools with mucus on Saturday, decreased stools on Sunday - has not had a BM in hospital   She reports common fecal urgency at home   She has decreased appetite and oral hydration  She denies sick contacts or recent travel  CTA PE w/ AP- No intraabdominal process seen   DDx including but not limited to: Crohn's Flare, gastroenteritis, gastritis, PUD, GERD, gastroparesis, hepatitis, pancreatitis, colitis, enteritis, food poisoning, mesenteric adenitis, IBD, IBS, ileus, bowel obstruction, cholecystitis, biliary colic, choledocholithiasis, constipation    Plan:  Plan for EGD 11/15, NPO at midnight   Follow up labs:  Fecal Calprotectin  Stool Enteric Panel  Ova and Parasite   C Diff   Pain control: Toradol, Tylenol, and Bentyl - IV Dilaudid 0.5 mg Q4 PRN breakthrough  GI on board, appreciate recommendations

## 2023-11-13 NOTE — ASSESSMENT & PLAN NOTE
Home regimen includes Midodrine 10mg BID   BP this morning 161/81, most recent /72    Plan:  Continue midodrine 10mg daily rather than BID due to some elevated BPs

## 2023-11-13 NOTE — ASSESSMENT & PLAN NOTE
Home regimen includes Levothyroxine 50mcg QD   Recent symptoms of multiple loose stools over the weekend  No other hyper or hypothyroidism symptoms    Plan-  TSH is pending  Continue home regimen

## 2023-11-13 NOTE — ASSESSMENT & PLAN NOTE
DDx including but not limited to: Crohn's Flare, gastroenteritis, gastritis, PUD, GERD, gastroparesis, hepatitis, pancreatitis, colitis, enteritis, food poisoning, mesenteric adenitis, IBD, IBS, ileus, bowel obstruction, cholecystitis, biliary colic, choledocholithiasis, constipation  Hx of Crohns on Infliximab infusion  Last infusion 9/29/23, unable to complete entire infusion 2/2 loss of IV access   Over the weekend had more than 4 episodes of loose stools, unusual for patient  CTA PE w/ AP- No intraabdominal process seen       Plan-   CLD for now until able to tolerate   Fecal Calprotectin  Covid  Stool Enteric Panel  Ova and Parasite   C Diff   Toradol, Tylenol, and Bentyl  Appreciate Gastroenterology recommendations

## 2023-11-13 NOTE — TELEPHONE ENCOUNTER
Patients GI provider:  Dr. Felicia Flores    Number to return call: [phone]    Reason for call: Pt calling to advise she has been having a burning pain in her abdomen since Sat morning as well as a fever on and off. Also having difficulty catching her breath, constipation since last Wed along with an urgency to go resulting in no bowel movements. Pt reports having mucus in her stool, stool is small and misshaped. Pt has also had nausea. Transferred to [facility] in triage for further assistance.     Scheduled procedure/appointment date if applicable: Apt 65/52/7484

## 2023-11-13 NOTE — ED PROVIDER NOTES
History  Chief Complaint   Patient presents with    Abdominal Pain     Burning pain in lower stomach that started on Saturday. Has been increasingly getting worse. Also having shortness of breath. Denies CP. 72-year-old female with Crohn's disease on infliximab monthly infusions, recently finished a Medrol Dosepak yesterday for muscle cramps, hepatic steatosis presenting to ED for evaluation of lower abdominal pain, nausea, shortness of breath x3 days. Abdominal pain is burning, constant, nonradiating. Associated nausea without vomiting. Reports dyspnea with exertion which is new for her since onset of ab pain. Normal bowel movements. No rectal bleeding or pain. Denies chest pain, urinary symptoms, URI symptoms, fever, chills, diarrhea, bloody stools, weight loss. Wt Readings from Last 3 Encounters:   11/13/23 78 kg (171 lb 15.3 oz)   11/07/23 76.7 kg (169 lb)   10/19/23 78 kg (172 lb)       Prior to Admission Medications   Prescriptions Last Dose Informant Patient Reported? Taking?    [facility] Aspirin Adult Low Strength 81 MG EC tablet 11/12/2023 Self Yes Yes   Calcium Carb-Cholecalciferol 600-12.5 MG-MCG CAPS 11/12/2023 Self Yes Yes   Sig: Take by mouth daily in the early morning   DULoxetine (CYMBALTA) 60 mg delayed release capsule 11/12/2023 Self No Yes   Sig: Take 1 capsule (60 mg total) by mouth daily   Magnesium 400 MG TABS 11/12/2023 Self Yes Yes   Sig: Take 800 mg by mouth daily   QUEtiapine (SEROquel) 100 mg tablet 11/12/2023 Self No Yes   Sig: Take 1 tablet (100 mg total) by mouth daily at bedtime   Sodium Fluoride 5000 PPM 1.1 % PSTE 11/12/2023 Self Yes Yes   Sig: USE ONCE DAILY PREFERABLY AT NIGHT   acetaminophen (TYLENOL) 650 mg CR tablet Past Week Self No Yes   Sig: Take 1 tablet (650 mg total) by mouth every 8 (eight) hours as needed for headaches, moderate pain or mild pain   albuterol (ProAir HFA) 90 mcg/act inhaler Past Week Self No Yes   Sig: Inhale 2 puffs every 6 (six) hours as needed for wheezing   budesonide (Pulmicort Flexhaler) 90 MCG/ACT inhaler Past Week Self No Yes   Sig: Inhale 1 puff 2 (two) times a day   calcium carbonate (OS-WILLY) 1250 (500 Ca) MG tablet 11/12/2023 Self Yes Yes   Sig: Take 1,250 mg by mouth   esomeprazole (NexIUM) 40 MG capsule 11/12/2023 Self No Yes   Sig: Take 1 capsule (40 mg total) by mouth daily before breakfast   gabapentin (NEURONTIN) 300 mg capsule 11/12/2023 Self No Yes   Sig: Take 1 capsule (300 mg total) by mouth 3 (three) times a day   levothyroxine 50 mcg tablet 11/12/2023  No Yes   Sig: TAKE 1 TABLET BY MOUTH EVERY DAY   midodrine (PROAMATINE) 10 MG tablet 11/12/2023 Self No Yes   Sig: Take 1 tablet (10 mg total) by mouth 2 (two) times a day      Facility-Administered Medications: None       Past Medical History:   Diagnosis Date    Abdominal adhesions     Anesthesia complication     difficult to wake up    Anxiety     Arthritis     Cancer ([address] [facility])     melanoma    Colon polyp     Depression     Depression     Disease of thyroid gland     Fibromyalgia     Fibromyalgia, primary     Fractures 2006    GERD (gastroesophageal reflux disease)     History of cholecystectomy 09/07/2019    Hyperlipidemia     Irregular heart beat     can be tachy; also has orthoststic hypotension    Lazy eye     resolved: 3/27/17    Medical clearance for psychiatric admission 07/13/2021    Melanoma ([address]) 2010    Osteoarthritis 07/2018    Osteopenia     with joint pain-elevated DEV    Psychiatric disorder     Shortness of breath     assoc with tachycardia    Stomach disorder 1995    Tinnitus     Wears glasses     for reading       Past Surgical History:   Procedure Laterality Date    ABDOMINAL SURGERY      lysis of adhesions x 2    APPENDECTOMY      CERVICAL FUSION      May 5,2021 and Jan. 01,2020    CHOLECYSTECTOMY      open    COLONOSCOPY      DILATION AND CURETTAGE OF UTERUS      FOOT SURGERY  05/2017    NECK SURGERY  01/2019 5/2021    NEUROMA EXCISION Right 05/26/2017 Procedure: EXCISION MASS / FIBROMA FOOT;  Surgeon: Letah Cao DPM;  Location: [facility];  Service:     PARS PLANA VITRECTOMY W/ REPAIR OF MACULAR HOLE  12/23/2020    HI XCAPSL CTRC RMVL INSJ IO LENS PROSTH W/O ECP Left 12/06/2021    Procedure: EXTRACTION EXTRACAPSULAR CATARACT PHACO INTRAOCULAR LENS (IOL); Surgeon: Tucker Hill MD;  Location: [facility] MAIN OR;  Service: Ophthalmology    RIGHT OOPHORECTOMY      WISDOM TOOTH EXTRACTION      x4       Family History   Problem Relation Age of Onset    Heart disease Mother     Stroke Mother 58    COPD Mother     Arthritis Mother     Hypertension Father     Dislocations Sister     Multiple sclerosis Sister     Neurological problems Sister     Scoliosis Sister     No Known Problems Maternal Grandmother     No Known Problems Maternal Grandfather     No Known Problems Paternal Grandmother     No Known Problems Paternal Grandfather     Kidney disease Brother         kidney transplant    No Known Problems Maternal Aunt     No Known Problems Maternal Uncle     No Known Problems Paternal Aunt     No Known Problems Paternal Uncle     ADD / ADHD Neg Hx     Anesthesia problems Neg Hx     Cancer Neg Hx     Clotting disorder Neg Hx     Collagen disease Neg Hx     Diabetes Neg Hx     Dislocations Neg Hx     Learning disabilities Neg Hx     Neurological problems Neg Hx     Osteoporosis Neg Hx     Rheumatologic disease Neg Hx     Scoliosis Neg Hx     Vascular Disease Neg Hx      I have reviewed and agree with the history as documented. E-Cigarette/Vaping    E-Cigarette Use Never User      E-Cigarette/Vaping Substances    Nicotine No     THC No     CBD No     Flavoring No     Other No     Unknown No      Social History     Tobacco Use    Smoking status: Never    Smokeless tobacco: Never   Vaping Use    Vaping Use: Never used   Substance Use Topics    Alcohol use: Not Currently    Drug use: No        Review of Systems   Constitutional:  Negative for chills and fever.    HENT: Negative for ear pain and sore throat. Eyes:  Negative for pain and visual disturbance. Respiratory:  Positive for shortness of breath. Negative for cough. Cardiovascular:  Negative for chest pain and palpitations. Gastrointestinal:  Positive for abdominal pain and nausea. Negative for vomiting. Genitourinary:  Negative for dysuria and hematuria. Musculoskeletal:  Negative for arthralgias and back pain. Skin:  Negative for color change and rash. Neurological:  Negative for seizures and syncope. All other systems reviewed and are negative. Physical Exam  ED Triage Vitals [11/13/23 1016]   Temperature Pulse Respirations Blood Pressure SpO2   (!) 97.3 °F (36.3 °C) (!) 120 22 122/79 98 %      Temp Source Heart Rate Source Patient Position - Orthostatic VS BP Location FiO2 (%)   Axillary Monitor Sitting Right arm --      Pain Score       8             Orthostatic Vital Signs  Vitals:    11/13/23 1230 11/13/23 1300 11/13/23 1500 11/13/23 1540   BP: 131/71 114/80  141/88   Pulse: 87 102 92 95   Patient Position - Orthostatic VS: Sitting Sitting  Sitting       Physical Exam  Vitals and nursing note reviewed. Constitutional:       General: She is in acute distress. Appearance: She is well-developed. HENT:      Head: Normocephalic and atraumatic. Eyes:      Conjunctiva/sclera: Conjunctivae normal.   Cardiovascular:      Rate and Rhythm: Regular rhythm. Tachycardia present. Pulses: Normal pulses. Radial pulses are 2+ on the right side and 2+ on the left side. Dorsalis pedis pulses are 2+ on the right side and 2+ on the left side. Heart sounds: Normal heart sounds and S1 normal. No murmur heard. Comments:  BPM  Pulmonary:      Effort: Pulmonary effort is normal. Tachypnea present. No respiratory distress. Breath sounds: Normal air entry. Rhonchi present. Comments: Bilateral rhonchi, RR of 22/minute, no retractions.   Abdominal:      Palpations: Abdomen is soft. Tenderness: There is no abdominal tenderness. Musculoskeletal:         General: No swelling. Cervical back: Neck supple. Right lower leg: No edema. Left lower leg: No edema. Skin:     General: Skin is warm and dry. Capillary Refill: Capillary refill takes less than 2 seconds. Neurological:      Mental Status: She is alert.    Psychiatric:         Mood and Affect: Mood normal.         ED Medications  Medications   ondansetron (ZOFRAN) injection 4 mg (4 mg Intravenous Given 11/13/23 1140)   sodium chloride 0.9 % bolus 1,000 mL (0 mL Intravenous Stopped 11/13/23 1410)   HYDROmorphone HCl (DILAUDID) injection 0.2 mg (0.2 mg Intravenous Given 11/13/23 1140)   iohexol (OMNIPAQUE) 350 MG/ML injection (MULTI-DOSE) 100 mL (100 mL Intravenous Given 11/13/23 1340)   HYDROmorphone (DILAUDID) injection 0.5 mg (0.5 mg Intravenous Given 11/13/23 1539)       Diagnostic Studies  Results Reviewed       Procedure Component Value Units Date/Time    COVID/FLU/RSV [[id number]]     Lab Status: No result Specimen: Nares from Nose     Stool Enteric Bacterial Panel by PCR [[id number]]     Lab Status: No result Specimen: Stool from Rectum     Clostridium difficile toxin by PCR with EIA [[id number]]     Lab Status: No result Specimen: Stool     Ova and parasite examination [[id number]]     Lab Status: No result Specimen: Stool     Giardia antigen [[id number]]     Lab Status: No result Specimen: Stool from Per Rectum     RBC Morphology Reflex Test [[id number]] Collected: 11/13/23 1123    Lab Status: Final result Specimen: Blood from Arm, Left Updated: 11/13/23 1301    Manual Differential(PHLEBS Do Not Order) [[id number]] Collected: 11/13/23 1123    Lab Status: Final result Specimen: Blood from Arm, Left Updated: 11/13/23 1245     Segmented % 65 %      Bands % 1 %      Lymphocytes % 24 %      Monocytes % 8 %      Eosinophils, % 1 %      Basophils % 1 %      Absolute Neutrophils 6.84 Thousand/uL Lymphocytes Absolute 2.49 Thousand/uL      Monocytes Absolute 0.83 Thousand/uL      Eosinophils Absolute 0.10 Thousand/uL      Basophils Absolute 0.10 Thousand/uL      Total Counted --     RBC Morphology Normal     Platelet Estimate Adequate    CBC and differential [[id number]]  (Abnormal) Collected: 11/13/23 1123    Lab Status: Final result Specimen: Blood from Arm, Left Updated: 11/13/23 1244     WBC 10.37 Thousand/uL      RBC 5.10 Million/uL      Hemoglobin 14.4 g/dL      Hematocrit 45.4 %      MCV 89 fL      MCH 28.2 pg      MCHC 31.7 g/dL      RDW 14.0 %      MPV 9.7 fL      Platelets 438 Thousands/uL     Narrative: This is an appended report. These results have been appended to a previously verified report. Lactic acid, plasma (w/reflex if result > 2.0) [[id number]]  (Normal) Collected: 11/13/23 1138    Lab Status: Final result Specimen: Blood from Arm, Left Updated: 11/13/23 1212     LACTIC ACID 1.3 mmol/L     Narrative:      Result may be elevated if tourniquet was used during collection.     Comprehensive metabolic panel [[id number]]  (Abnormal) Collected: 11/13/23 1123    Lab Status: Final result Specimen: Blood from Arm, Left Updated: 11/13/23 1211     Sodium 138 mmol/L      Potassium 4.1 mmol/L      Chloride 101 mmol/L      CO2 29 mmol/L      ANION GAP 8 mmol/L      BUN 18 mg/dL      Creatinine 0.90 mg/dL      Glucose 92 mg/dL      Calcium 9.7 mg/dL      AST 42 U/L      ALT 73 U/L      Alkaline Phosphatase 92 U/L      Total Protein 7.3 g/dL      Albumin 4.1 g/dL      Total Bilirubin 0.57 mg/dL      eGFR 66 ml/min/1.73sq m     Narrative:      [place] guidelines for Chronic Kidney Disease (CKD):     Stage 1 with normal or high GFR (GFR > 90 mL/min/1.73 square meters)    Stage 2 Mild CKD (GFR = 60-89 mL/min/1.73 square meters)    Stage 3A Moderate CKD (GFR = 45-59 mL/min/1.73 square meters)    Stage 3B Moderate CKD (GFR = 30-44 mL/min/1.73 square meters)    Stage 4 Severe CKD (GFR = 15-29 mL/min/1.73 square meters)    Stage 5 End Stage CKD (GFR <15 mL/min/1.73 square meters)  Note: GFR calculation is accurate only with a steady state creatinine    Lipase [[id number]]  (Normal) Collected: 11/13/23 1123    Lab Status: Final result Specimen: Blood from Arm, Left Updated: 11/13/23 1211     Lipase 17 u/L     Procalcitonin [[id number]]  (Normal) Collected: 11/13/23 1123    Lab Status: Final result Specimen: Blood from Arm, Left Updated: 11/13/23 1210     Procalcitonin <0.05 ng/ml     Protime-INR [[id number]]  (Normal) Collected: 11/13/23 1138    Lab Status: Final result Specimen: Blood from Arm, Left Updated: 11/13/23 1153     Protime 13.0 seconds      INR 0.93    APTT [[id number]]  (Normal) Collected: 11/13/23 1138    Lab Status: Final result Specimen: Blood from Arm, Left Updated: 11/13/23 1153     PTT 27 seconds     Urine Microscopic [[id number]]  (Abnormal) Collected: 11/13/23 1126    Lab Status: Final result Specimen: Urine, Clean Catch Updated: 11/13/23 1139     RBC, UA 1-2 /hpf      WBC, UA 2-4 /hpf      Epithelial Cells Occasional /hpf      Bacteria, UA Occasional /hpf      MUCUS THREADS Innumerable     Hyaline Casts, UA 0-3 /lpf     UA w Reflex to Microscopic w Reflex to Culture [[id number]]  (Abnormal) Collected: 11/13/23 1126    Lab Status: Final result Specimen: Urine, Clean Catch Updated: 11/13/23 1134     Color, UA Yellow     Clarity, UA Clear     Specific Gravity, UA 1.026     pH, UA 5.0     Leukocytes, UA Small     Nitrite, UA Negative     Protein, UA Trace mg/dl      Glucose, UA Negative mg/dl      Ketones, UA Trace mg/dl      Urobilinogen, UA 2.0 mg/dl      Bilirubin, UA Negative     Occult Blood, UA Negative    Blood culture #1 [[id number]] Collected: 11/13/23 1123    Lab Status: In process Specimen: Blood from Arm, Left Updated: 11/13/23 1129    Blood culture #2 [[id number]] Collected: 11/13/23 1123    Lab Status:  In process Specimen: Blood from Arm, Right Updated: 11/13/23 1124 Calprotectin,Fecal [[id number]]     Lab Status: No result Specimen: Stool                    PE Study with CT abdomen & pelvis with contrast   Final Result by Hayden Gutierrez MD (11/13 1430)      1. No filling defect to suggest acute or chronic pulmonary embolism in the entire pulmonary arterial system. 2.  Suggestion of mosaic lung parenchymal attenuation compatible with mild small vessel/small airways disease. 3.  No acute findings in the abdomen or pelvis. Workstation performed: [id number]               Procedures  ECG 12 Lead Documentation Only    Date/Time: 11/13/2023 12:15 PM    Performed by: Frank Valenzuela MD  Authorized by: Frank Valenzuela MD    Previous ECG:     Previous ECG:  Compared to current    Comparison ECG info:  5/1/23    Similarity:  No change  Interpretation:     Interpretation: abnormal    Rate:     ECG rate:  119    ECG rate assessment: tachycardic    Rhythm:     Rhythm: sinus rhythm    Ectopy:     Ectopy: none    QRS:     QRS axis:  Left    QRS intervals:  Normal  Conduction:     Conduction: normal    ST segments:     ST segments:  Normal  T waves:     T waves: normal    ECG 12 Lead Documentation Only    Date/Time: 11/13/2023 1:36 PM    Performed by: Frank Valenzuela MD  Authorized by: Frank Valenzuela MD    Indications / Diagnosis:  2 hr ecg  Patient location:  ED  Previous ECG:     Previous ECG:  Compared to current    Comparison ECG info:   Today, 2 hrs ago    Similarity:  Changes noted (NSR replaced sinus tachy)  Interpretation:     Interpretation: normal    Rate:     ECG rate:  89    ECG rate assessment: normal    Rhythm:     Rhythm: sinus rhythm    Ectopy:     Ectopy: none    QRS:     QRS axis:  Normal    QRS intervals:  Normal  Conduction:     Conduction: normal    ST segments:     ST segments:  Normal  T waves:     T waves: normal          ED Course  ED Course as of 11/13/23 1727   Mon Nov 13, 2023   1213 AST(!): 42   1213 ALT(!): 73   1225 Patient's tachycardia seems to be resolving with fluids. Initial heart rate was 120 bpm, after receiving some IV fluids, heart rate is now 90 bpm.   1333 Pt taken to CT   1400 Pt re-eval; still with ab pain and nausea   1430 PE Study with CT abdomen & pelvis with contrast       IMPRESSION:     1. No filling defect to suggest acute or chronic pulmonary embolism in the entire pulmonary arterial system. 2.  Suggestion of mosaic lung parenchymal attenuation compatible with mild small vessel/small airways disease. 3.  No acute findings in the abdomen or pelvis. 1459 Pt updated about all labs and imaging and need for follow up with PCP for mediastinal LN's, follow up with GI for her ab pain in setting of Crohn's. verbalized understanding   [id number] Patient was given option of admission for observation versus discharge home, feels comfortable going home. Will discharge   1526 Went to discharge pt; pt started crying because she is still in pain. Again given option of admission. Pt would like to be admitted. SLIM texted   [phone] Pt accepted to obs under Dr. Maya Trinidad Name 11/13/23 1531                Is the patient's history suggestive of a new or worsening infection?  No  -TR                  User Key  (r) = Recorded By, (t) = Taken By, (c) = Cosigned By      [address] [facility] Name Provider Umair Villa MD Resident                        Ishmael Taveras' Criteria for PE      Flowsheet Row Most Recent Value   Wells' Criteria for PE    Clinical signs and symptoms of DVT 0 Filed at: 11/13/2023 1225   PE is primary diagnosis or equally likely 3 Filed at: 11/13/2023 1225   HR >100 1.5 Filed at: 11/13/2023 1225   Immobilization at least 3 days or Surgery in the previous 4 weeks 0 Filed at: 11/13/2023 1225   Previous, objectively diagnosed PE or DVT 0 Filed at: 11/13/2023 1225   Hemoptysis 0 Filed at: 11/13/2023 1225   Malignancy with treatment within 6 months or palliative 0 Filed at: 11/13/2023 1225   Wells' Criteria Total 4.5 Filed at: 11/13/2023 1225              Medical Decision Making  52-year-old female with Crohn's presenting to ED for evaluation of abdominal pain, nausea, shortness of breath for 3 days    We will obtain labs, CTA PE study with abdomen and pelvis, ECG, normal saline, Dilaudid and Zofran. Patient was meeting SIRS criteria due to tachycardia, tachypnea, leukocytosis. No current source of infection, therefore no antibiotics were given. CTA PE study negative for PE, did reveal chronic findings which were discussed with patient, advised to follow-up with a PCP regarding incidentals, verbalized understanding. Patient was given option of discharge home versus admission, initially she wanted to go home, when I went to give patient discharge papers, patient started crying due to the pain, patient was then given the option again of admission and wanted to be admitted at that time. Case was discussed with internal medicine, patient admitted under their service. Amount and/or Complexity of Data Reviewed  External Data Reviewed: labs, ECG and notes. Labs: ordered. Decision-making details documented in ED Course. Radiology: ordered. Decision-making details documented in ED Course. ECG/medicine tests: ordered and independent interpretation performed. Risk  Prescription drug management. Parenteral controlled substances. Decision regarding hospitalization.           Disposition  Final diagnoses:   Abdominal pain   Dyspnea   Mediastinal lymphadenopathy   Hiatal hernia   Transaminitis     Time reflects when diagnosis was documented in both MDM as applicable and the Disposition within this note       Time User Action Codes Description Comment    11/13/2023  3:09 PM Arty Larger Add [R10.9] Abdominal pain     11/13/2023  3:09 PM Arty Larger Add [R06.00] Dyspnea     11/13/2023  3:10 PM Arty Larger Add [R59.0] Mediastinal lymphadenopathy     11/13/2023  3:10 PM Pegjagruti Alonzos Add [K44.9] Hiatal hernia     11/13/2023  3:30 PM Pegjagruti Alonzos Add [R74.01] Transaminitis     11/13/2023  5:17 PM Jamie Wallaceork Add [K50.80] Crohn's disease of both small and large intestine without complication [facility])           ED Disposition       ED Disposition   Admit    Condition   Stable    Date/Time   Mon Nov 13, 2023 1526    Comment   Case was discussed with PAVEL and the patient's admission status was agreed to be Admission Status: observation status to the service of Dr. Suzanne Maxwell  . Follow-up Information       Follow up With Specialties Details Why Contact Info    Flor Jones MD Internal Medicine, Family Medicine Schedule an appointment as soon as possible for a visit today for follow up of symptoms [address] [place] [address]. [address] [place] [address]  [phone]              Patient's Medications   Discharge Prescriptions    No medications on file     No discharge procedures on file. PDMP Review         Value Time User    PDMP Reviewed  Yes 11/24/2022 10:33 AM Deepali Stewart [place]             ED Provider  Attending physically available and evaluated Snehal Coronado. I managed the patient along with the ED Attending.     Electronically Signed by           Tiffany Dickinson MD  11/13/23 9680

## 2023-11-13 NOTE — TELEPHONE ENCOUNTER
Pt transferred to nurses line. Reports Saturday started with burning pain no specific area, constipation- last BM was on thursday and small stool with mucus this morning- no blood. Reports she may of had a fever over the weekend she was sweating but no chills. She also feels SOB since yesterday with heavy breathing   She would like to be evaluated in the ED. Order placed she will go to [facility] ED. Pt was on methylprednisone 5 days 11/7-11/11 for cramping in hands and thigh.        Reason for Disposition   Patient sounds very sick or weak to the triager    Protocols used: Abdominal Pain - Female-ADULT-[place]

## 2023-11-13 NOTE — ED ATTENDING ATTESTATION
11/13/2023  ISee MD, saw and evaluated the patient. I have discussed the patient with the resident/non-physician practitioner and agree with the resident's/non-physician practitioner's findings, Plan of Care, and MDM as documented in the resident's/non-physician practitioner's note, except where noted. All available labs and Radiology studies were reviewed. I was present for key portions of any procedure(s) performed by the resident/non-physician practitioner and I was immediately available to provide assistance. At this point I agree with the current assessment done in the Emergency Department. I have conducted an independent evaluation of this patient a history and physical is as follows: Hx Crohns, on infliximab, 3d lower abd burning sensation. Some nausea, no vomiting. Nonbloody BMs. Recently finished medrol dosepak. Appears uncomfortable. VS and nursing notes reviewed  General: Appears in moderately uncomfortable, awake, alert, speaking normally in full sentences. Well-nourished, well-developed. Appears stated age. Head: Normocephalic, atraumatic. Eyes: EOMI. Vision grossly normal. No subconjunctival hemorrhages or occular discharge noted. Symmetrical lids. ENT: Atraumatic external nose and ears. No stridor. Normal phonation. No drooling. Normal swallowing. Neck: No JVD. FROM. No goiter  CV: No pallor. Normal rate. Lungs: No tachypnea. No respiratory distress. MSK: Moving all extremities equally, no peripheral edema  ABD: Diffuse tenderness, no guarding. Skin: Dry, intact. No cyanosis  Neuro: Awake, alert, GCS15. CN II-XII grossly intact. Grossly normal gait. Psychiatric/Behavioral: Appropriate mood and affect. A/P: This is a 80 y.o. female who presents to the ED for evaluation of abd pain. Labs, CT Scan. Pain Control. Reevalutae and dispo accordingly.       ED Course         Critical Care Time  Procedures

## 2023-11-13 NOTE — ASSESSMENT & PLAN NOTE
DDx including but not limited to: Cardiac etiology, URI, bronchitis, bronchiolitis, pneumonia, viral syndrome, COVID 19, aspiration, asthma  Complaining of BLACK, non productive cough, and inability to lie flat   Saturing well on RA   CTA PE Study- Negative for PE, mosaic pattern in the lung parenchyma concerning for small vessel/ small airway disease  Per PCP-patient has been prescribing nebulized treatments and inhalers for this intermittent episodes of SOB    Plan-   ECHO in the setting of Infliximab infusions for possible new heart failure  Consider Pulmonology consult for CT lung findings

## 2023-11-13 NOTE — ASSESSMENT & PLAN NOTE
Presented with few day history of dyspnea on exertion, nonproductive cough, and inability to lie flat  Pt sleeps with 2 pillows at home   BNP normal 14  Saturating well on RA   Bilateral crackles on exam   CTA PE Study- Negative for PE, mosaic pattern in the lung parenchyma concerning for small vessel/ small airway disease  Per PCP-patient has been prescribed nebulized treatments and inhalers for this intermittent episodes of SOB  Echo - LVEF 60%, normal function  Currently saturating well on RA     Plan:   Monitor oxygenation on room air   Imaging reviewed with pulmonology, recommend outpatient referral on discharge

## 2023-11-14 ENCOUNTER — APPOINTMENT (OUTPATIENT)
Dept: NON INVASIVE DIAGNOSTICS | Facility: HOSPITAL | Age: 68
DRG: 387 | End: 2023-11-14
Payer: COMMERCIAL

## 2023-11-14 PROBLEM — R79.89 ELEVATED LFTS: Status: ACTIVE | Noted: 2023-11-14

## 2023-11-14 LAB
ALBUMIN SERPL BCP-MCNC: 3.7 G/DL (ref 3.5–5)
ALP SERPL-CCNC: 85 U/L (ref 34–104)
ALT SERPL W P-5'-P-CCNC: 73 U/L (ref 7–52)
ANA SER QL IA: NEGATIVE
ANION GAP SERPL CALCULATED.3IONS-SCNC: 4 MMOL/L
AORTIC ROOT: 3.3 CM
APICAL FOUR CHAMBER EJECTION FRACTION: 64 %
ASCENDING AORTA: 3.3 CM
AST SERPL W P-5'-P-CCNC: 46 U/L (ref 13–39)
ATRIAL RATE: 119 BPM
ATRIAL RATE: 89 BPM
BILIRUB SERPL-MCNC: 0.74 MG/DL (ref 0.2–1)
BUN SERPL-MCNC: 16 MG/DL (ref 5–25)
CALCIUM SERPL-MCNC: 9 MG/DL (ref 8.4–10.2)
CHLORIDE SERPL-SCNC: 105 MMOL/L (ref 96–108)
CO2 SERPL-SCNC: 27 MMOL/L (ref 21–32)
CREAT SERPL-MCNC: 0.81 MG/DL (ref 0.6–1.3)
CRP SERPL QL: 15.8 MG/L
E WAVE DECELERATION TIME: 293 MS
E/A RATIO: 0.68
FERRITIN SERPL-MCNC: 83 NG/ML (ref 11–307)
FRACTIONAL SHORTENING: 21 (ref 28–44)
GFR SERPL CREATININE-BSD FRML MDRD: 75 ML/MIN/1.73SQ M
GLUCOSE SERPL-MCNC: 91 MG/DL (ref 65–140)
IGA SERPL-MCNC: 271 MG/DL (ref 66–433)
IGG SERPL-MCNC: 858 MG/DL (ref 635–1741)
IGM SERPL-MCNC: 130 MG/DL (ref 45–281)
INTERVENTRICULAR SEPTUM IN DIASTOLE (PARASTERNAL SHORT AXIS VIEW): 1.3 CM
INTERVENTRICULAR SEPTUM: 1.3 CM (ref 0.6–1.1)
IRON SATN MFR SERPL: 34 % (ref 15–50)
IRON SERPL-MCNC: 117 UG/DL (ref 50–212)
LAAS-AP2: 11.6 CM2
LAAS-AP4: 10.8 CM2
LEFT ATRIUM SIZE: 3.1 CM
LEFT ATRIUM VOLUME (MOD BIPLANE): 26 ML
LEFT ATRIUM VOLUME INDEX (MOD BIPLANE): 14 ML/M2
LEFT INTERNAL DIMENSION IN SYSTOLE: 2.7 CM (ref 2.1–4)
LEFT VENTRICULAR INTERNAL DIMENSION IN DIASTOLE: 3.4 CM (ref 3.5–6)
LEFT VENTRICULAR POSTERIOR WALL IN END DIASTOLE: 1.1 CM
LEFT VENTRICULAR STROKE VOLUME: 23 ML
LVSV (TEICH): 23 ML
MAGNESIUM SERPL-MCNC: 2.1 MG/DL (ref 1.9–2.7)
MV E'TISSUE VEL-SEP: 9 CM/S
MV PEAK A VEL: 0.78 M/S
MV PEAK E VEL: 53 CM/S
MV STENOSIS PRESSURE HALF TIME: 85 MS
MV VALVE AREA P 1/2 METHOD: 2.59
P AXIS: 55 DEGREES
P AXIS: 59 DEGREES
POTASSIUM SERPL-SCNC: 4.3 MMOL/L (ref 3.5–5.3)
PR INTERVAL: 148 MS
PR INTERVAL: 160 MS
PROT SERPL-MCNC: 6.6 G/DL (ref 6.4–8.4)
QRS AXIS: -19 DEGREES
QRS AXIS: -30 DEGREES
QRSD INTERVAL: 72 MS
QRSD INTERVAL: 78 MS
QT INTERVAL: 312 MS
QT INTERVAL: 368 MS
QTC INTERVAL: 438 MS
QTC INTERVAL: 447 MS
RA PRESSURE ESTIMATED: 3 MMHG
RIGHT ATRIAL 2D VOLUME: 18 ML
RIGHT ATRIUM AREA SYSTOLE A4C: 9.8 CM2
RIGHT VENTRICLE ID DIMENSION: 2.6 CM
SL CV LEFT ATRIUM LENGTH A2C: 4.1 CM
SL CV LV EF: 60
SL CV PED ECHO LEFT VENTRICLE DIASTOLIC VOLUME (MOD BIPLANE) 2D: 49 ML
SL CV PED ECHO LEFT VENTRICLE SYSTOLIC VOLUME (MOD BIPLANE) 2D: 26 ML
SODIUM SERPL-SCNC: 136 MMOL/L (ref 135–147)
T WAVE AXIS: 36 DEGREES
T WAVE AXIS: 48 DEGREES
TIBC SERPL-MCNC: 340 UG/DL (ref 250–450)
TRICUSPID ANNULAR PLANE SYSTOLIC EXCURSION: 1.6 CM
UIBC SERPL-MCNC: 223 UG/DL (ref 155–355)
VENTRICULAR RATE: 119 BPM
VENTRICULAR RATE: 89 BPM

## 2023-11-14 PROCEDURE — 82784 ASSAY IGA/IGD/IGG/IGM EACH: CPT

## 2023-11-14 PROCEDURE — 80053 COMPREHEN METABOLIC PANEL: CPT

## 2023-11-14 PROCEDURE — 86258 DGP ANTIBODY EACH IG CLASS: CPT

## 2023-11-14 PROCEDURE — C9113 INJ PANTOPRAZOLE SODIUM, VIA: HCPCS

## 2023-11-14 PROCEDURE — 82390 ASSAY OF CERULOPLASMIN: CPT

## 2023-11-14 PROCEDURE — 80074 ACUTE HEPATITIS PANEL: CPT

## 2023-11-14 PROCEDURE — 83540 ASSAY OF IRON: CPT

## 2023-11-14 PROCEDURE — 86231 EMA EACH IG CLASS: CPT

## 2023-11-14 PROCEDURE — 93010 ELECTROCARDIOGRAM REPORT: CPT | Performed by: INTERNAL MEDICINE

## 2023-11-14 PROCEDURE — 86364 TISS TRNSGLTMNASE EA IG CLAS: CPT

## 2023-11-14 PROCEDURE — 94760 N-INVAS EAR/PLS OXIMETRY 1: CPT

## 2023-11-14 PROCEDURE — 82728 ASSAY OF FERRITIN: CPT

## 2023-11-14 PROCEDURE — 93306 TTE W/DOPPLER COMPLETE: CPT | Performed by: INTERNAL MEDICINE

## 2023-11-14 PROCEDURE — 86235 NUCLEAR ANTIGEN ANTIBODY: CPT

## 2023-11-14 PROCEDURE — 86381 MITOCHONDRIAL ANTIBODY EACH: CPT

## 2023-11-14 PROCEDURE — 99214 OFFICE O/P EST MOD 30 MIN: CPT | Performed by: INTERNAL MEDICINE

## 2023-11-14 PROCEDURE — 82103 ALPHA-1-ANTITRYPSIN TOTAL: CPT

## 2023-11-14 PROCEDURE — 99232 SBSQ HOSP IP/OBS MODERATE 35: CPT | Performed by: INTERNAL MEDICINE

## 2023-11-14 PROCEDURE — 83550 IRON BINDING TEST: CPT

## 2023-11-14 PROCEDURE — 93306 TTE W/DOPPLER COMPLETE: CPT

## 2023-11-14 PROCEDURE — 86038 ANTINUCLEAR ANTIBODIES: CPT

## 2023-11-14 PROCEDURE — 86015 ACTIN ANTIBODY EACH: CPT

## 2023-11-14 PROCEDURE — 83735 ASSAY OF MAGNESIUM: CPT

## 2023-11-14 PROCEDURE — 86140 C-REACTIVE PROTEIN: CPT

## 2023-11-14 RX ORDER — AMOXICILLIN 250 MG
1 CAPSULE ORAL
Status: DISCONTINUED | OUTPATIENT
Start: 2023-11-14 | End: 2023-11-15

## 2023-11-14 RX ORDER — ACETAMINOPHEN 325 MG/1
650 TABLET ORAL EVERY 6 HOURS SCHEDULED
Status: DISCONTINUED | OUTPATIENT
Start: 2023-11-14 | End: 2023-11-15

## 2023-11-14 RX ORDER — HYDROMORPHONE HCL/PF 1 MG/ML
0.5 SYRINGE (ML) INJECTION EVERY 4 HOURS PRN
Status: DISCONTINUED | OUTPATIENT
Start: 2023-11-14 | End: 2023-11-15

## 2023-11-14 RX ORDER — POLYETHYLENE GLYCOL 3350 17 G/17G
17 POWDER, FOR SOLUTION ORAL 2 TIMES DAILY
Status: DISCONTINUED | OUTPATIENT
Start: 2023-11-14 | End: 2023-11-17 | Stop reason: HOSPADM

## 2023-11-14 RX ORDER — ONDANSETRON HYDROCHLORIDE 4 MG/5ML
0.1 SOLUTION ORAL EVERY 6 HOURS PRN
Status: DISCONTINUED | OUTPATIENT
Start: 2023-11-14 | End: 2023-11-14

## 2023-11-14 RX ORDER — ALBUTEROL SULFATE 90 UG/1
2 AEROSOL, METERED RESPIRATORY (INHALATION) EVERY 4 HOURS PRN
Status: DISCONTINUED | OUTPATIENT
Start: 2023-11-14 | End: 2023-11-17 | Stop reason: HOSPADM

## 2023-11-14 RX ORDER — ONDANSETRON HYDROCHLORIDE 4 MG/5ML
4 SOLUTION ORAL EVERY 6 HOURS PRN
Status: DISCONTINUED | OUTPATIENT
Start: 2023-11-14 | End: 2023-11-17 | Stop reason: HOSPADM

## 2023-11-14 RX ORDER — HYDROMORPHONE HCL/PF 1 MG/ML
0.5 SYRINGE (ML) INJECTION EVERY 4 HOURS PRN
Status: DISCONTINUED | OUTPATIENT
Start: 2023-11-14 | End: 2023-11-14

## 2023-11-14 RX ADMIN — MIDODRINE HYDROCHLORIDE 10 MG: 5 TABLET ORAL at 08:14

## 2023-11-14 RX ADMIN — DICYCLOMINE HYDROCHLORIDE 20 MG: 20 TABLET ORAL at 12:05

## 2023-11-14 RX ADMIN — HYDROMORPHONE HYDROCHLORIDE 0.5 MG: 1 INJECTION, SOLUTION INTRAMUSCULAR; INTRAVENOUS; SUBCUTANEOUS at 13:03

## 2023-11-14 RX ADMIN — DICYCLOMINE HYDROCHLORIDE 20 MG: 20 TABLET ORAL at 16:50

## 2023-11-14 RX ADMIN — DULOXETINE HYDROCHLORIDE 60 MG: 60 CAPSULE, DELAYED RELEASE ORAL at 08:14

## 2023-11-14 RX ADMIN — GABAPENTIN 300 MG: 300 CAPSULE ORAL at 20:51

## 2023-11-14 RX ADMIN — ACETAMINOPHEN 650 MG: 325 TABLET, FILM COATED ORAL at 13:03

## 2023-11-14 RX ADMIN — SENNOSIDES AND DOCUSATE SODIUM 1 TABLET: 8.6; 5 TABLET ORAL at 21:44

## 2023-11-14 RX ADMIN — ENOXAPARIN SODIUM 40 MG: 40 INJECTION SUBCUTANEOUS at 08:05

## 2023-11-14 RX ADMIN — ASPIRIN 81 MG: 81 TABLET, COATED ORAL at 08:05

## 2023-11-14 RX ADMIN — KETOROLAC TROMETHAMINE 15 MG: 30 INJECTION, SOLUTION INTRAMUSCULAR; INTRAVENOUS at 20:51

## 2023-11-14 RX ADMIN — DICYCLOMINE HYDROCHLORIDE 20 MG: 20 TABLET ORAL at 21:45

## 2023-11-14 RX ADMIN — DICYCLOMINE HYDROCHLORIDE 20 MG: 20 TABLET ORAL at 08:00

## 2023-11-14 RX ADMIN — POLYETHYLENE GLYCOL 3350 17 G: 17 POWDER, FOR SOLUTION ORAL at 17:21

## 2023-11-14 RX ADMIN — KETOROLAC TROMETHAMINE 15 MG: 30 INJECTION, SOLUTION INTRAMUSCULAR; INTRAVENOUS at 08:04

## 2023-11-14 RX ADMIN — ONDANSETRON HYDROCHLORIDE 4 MG: 4 SOLUTION ORAL at 18:53

## 2023-11-14 RX ADMIN — GABAPENTIN 300 MG: 300 CAPSULE ORAL at 08:06

## 2023-11-14 RX ADMIN — GABAPENTIN 300 MG: 300 CAPSULE ORAL at 17:18

## 2023-11-14 RX ADMIN — QUETIAPINE FUMARATE 100 MG: 100 TABLET ORAL at 21:45

## 2023-11-14 RX ADMIN — PANTOPRAZOLE SODIUM 40 MG: 40 INJECTION, POWDER, FOR SOLUTION INTRAVENOUS at 08:06

## 2023-11-14 RX ADMIN — ACETAMINOPHEN 650 MG: 325 TABLET, FILM COATED ORAL at 17:21

## 2023-11-14 RX ADMIN — LEVOTHYROXINE SODIUM 50 MCG: 50 TABLET ORAL at 08:41

## 2023-11-14 RX ADMIN — Medication 400 MG: at 08:14

## 2023-11-14 NOTE — CONSULTS
[facility] - New Consultation  Dennis Day 80 y.o. female MRN: [id number]  Encounter: [id number]          ASSESSMENT AND PLAN:      Abdominal pain  Patient presented with abdominal pain after a 6 day course of Prednisone for muscle spasms of the hands prescribed by her PCP. Also reports NSAID use over the preceding weekend. Abdominal pain is possibly in the setting of PUD given steroid use and initial burning quality. Lower suspicion for Crohn's flare due to recent steroid use prior to symptoms onset. Patient also noted to have large stool burden on presenting imaging which could be contributing to her current symptoms. - Will plan for EGD tomorrow. - Depending on results, may need to be followed by small bowel series.   - Start clear liquid diet, transition to NPO at midnight.   - Follow up on CRP. - Follow up on Fecal calprotectin  - Follow up on stool enteric panel, C diff PCR with EIA, Stool Ova and parasite  - Will start patient on a bowel regimen with Miralax and Senokot S.     Elevated LFT  Patient presents with elevated LFTs - AST 42, ALT 73. Values noted to be as high as AST - 101 and ALT - 190 on 10/3. Patient is being worked up by Dr. Анна Rowland for possible causes. Has prior DEV antibody elevation history, has had normal AMA and anti-smooth muscle antibodies in the past. Possible JIMÉNEZ vs drug induced liver injury, lower suspicion for viral hepatitis given negative HbS Ag  and Hep C antibody in May 2023.      - Acute hepatitis panel ordered  - alpha - 1 antitrypsin ordered  - Follow up on iron panel, ceruloplasmin  - DEV, AMA and anti-smooth muscle antibody ordered  - IgG, quantitative ordered  - Anti-histone antibody ordered  - Celiac panel ordered    Crohn's disease on Infliximab  At this time low suspicion for Crohn's flare given 1 day of 4-5 loose bowel movement without overt diarrhea, patient was also taking a Prednisone taper prescribed by PCP the week prior to symptoms onset. - Follow up  on CRP  - Follow up on fecal calprotectin. - At this time hold off on steroid initiation    Shortness of Breath  Suspect likely in the setting of infectious etiology  Management per primary team.      ______________________________________________________________________    HPI:  Ms. Franchesca Hubbard is a very pleasant 73-year-old female with a past medical history of Crohn's disease on infliximab, GERD, JIMÉNEZ, fibromyalgia, 2 episodes of SBO approximately 20 years ago. Patient also has a history of cholecystectomy and appendectomy complicated by adhesions s/p lysis. Patient was diagnosed with Crohn's disease after presenting with nausea vomiting and abdominal pain to the ED with CT demonstrating inflammatory changes of the terminal ileum consistent with Crohn's. Due to persistent symptoms, patient returned to the ED in early May where CT showed terminal ileum inflammation and increased fecal calprotectin, ESR and CRP on evaluation. Colonoscopy and EGD were performed during that visit, demonstrating terminal ileal inflammation. Was started on prednisone which was eventually weaned off. She was then initiated on infliximab infusions, last infusion was on 9/29. Patient notes that at that time she received only half of her normal dose due to loss of IV access. She now presents with a 3 day history of abdominal pain that started on 11/12 as diffuse, burning pain radiating to the back, however now she notes that it has gotten worse, it is 6/10 constant dull ache located in the RUQ and RLQ. Patient states that she has tried tylenol and ibuprofen without relief, denies aggravating factors. She has associated shortness of breath and nonproductive cough that started around the same time. Patient reports nausea and vomiting on 11/12, as well as 4 loose, light colored stools. She also has a history of lower extremity cramps for which she was managed by her rheumatologist with a course of steroids. Denies fevers, chills, further episodes of nausea and vomiting, chest pain, changes in urinary habits or urine color, leg swelling. At the time of my evaluation patient had an episode of severe abdominal cramping located in the suprapubic area, lasting approximately 10 seconds and resolving spontaneously. REVIEW OF SYSTEMS:    CONSTITUTIONAL: Denies any fever, chills, rigors, and weight loss. HEENT: No earache or tinnitus. Denies hearing loss or visual disturbances. CARDIOVASCULAR: No chest pain or palpitations. RESPIRATORY:  Endorses nonproductive cough, SoB and [place], no hemoptysis. GASTROINTESTINAL: As in HPI  GENITOURINARY: No problems with urination. Denies any hematuria or dysuria. NEUROLOGIC: No dizziness or vertigo, denies headaches. MUSCULOSKELETAL: Denies any muscle or joint pain at this time, notes pain in knee and hand joints prior to steroids. SKIN: Denies skin rashes or itching. ENDOCRINE: Denies excessive thirst. Denies intolerance to heat or cold. PSYCHOSOCIAL: Denies depression or anxiety. Denies any recent memory loss.        Historical Information   Past Medical History:   Diagnosis Date    Abdominal adhesions     Anesthesia complication     difficult to wake up    Anxiety     Arthritis     Cancer ([address])     melanoma    Colon polyp     Depression     Depression     Disease of thyroid gland     Fibromyalgia     Fibromyalgia, primary     Fractures 2006    GERD (gastroesophageal reflux disease)     History of cholecystectomy 09/07/2019    Hyperlipidemia     Irregular heart beat     can be tachy; also has orthoststic hypotension    Lazy eye     resolved: 3/27/17    Medical clearance for psychiatric admission 07/13/2021    Melanoma ([address]) 2010    Osteoarthritis 07/2018    Osteopenia     with joint pain-elevated DEV    Psychiatric disorder     Shortness of breath     assoc with tachycardia    Stomach disorder 1995    Tinnitus     Wears glasses     for reading     Past Surgical History: Procedure Laterality Date    ABDOMINAL SURGERY      lysis of adhesions x 2    APPENDECTOMY      CERVICAL FUSION      May 5,2021 and Jan. 01,2020    CHOLECYSTECTOMY      open    COLONOSCOPY      DILATION AND CURETTAGE OF UTERUS      FOOT SURGERY  05/2017    NECK SURGERY  01/2019 5/2021    NEUROMA EXCISION Right 05/26/2017    Procedure: EXCISION MASS / FIBROMA FOOT;  Surgeon: Jeremy Pelayo DPM;  Location: [facility];  Service:     PARS PLANA VITRECTOMY W/ REPAIR OF MACULAR HOLE  12/23/2020    CA XCAPSL CTRC RMVL INSJ IO LENS PROSTH W/O ECP Left 12/06/2021    Procedure: EXTRACTION EXTRACAPSULAR CATARACT PHACO INTRAOCULAR LENS (IOL);   Surgeon: Sharyle Bowen, MD;  Location: [facility] MAIN OR;  Service: Ophthalmology    RIGHT OOPHORECTOMY      WISDOM TOOTH EXTRACTION      x4     Social History   Social History     Substance and Sexual Activity   Alcohol Use Not Currently     Social History     Substance and Sexual Activity   Drug Use No     Social History     Tobacco Use   Smoking Status Never   Smokeless Tobacco Never     Family History   Problem Relation Age of Onset    Heart disease Mother     Stroke Mother 58    COPD Mother     Arthritis Mother     Hypertension Father     Dislocations Sister     Multiple sclerosis Sister     Neurological problems Sister     Scoliosis Sister     No Known Problems Maternal Grandmother     No Known Problems Maternal Grandfather     No Known Problems Paternal Grandmother     No Known Problems Paternal Grandfather     Kidney disease Brother         kidney transplant    No Known Problems Maternal Aunt     No Known Problems Maternal Uncle     No Known Problems Paternal Aunt     No Known Problems Paternal Uncle     ADD / ADHD Neg Hx     Anesthesia problems Neg Hx     Cancer Neg Hx     Clotting disorder Neg Hx     Collagen disease Neg Hx     Diabetes Neg Hx     Dislocations Neg Hx     Learning disabilities Neg Hx     Neurological problems Neg Hx     Osteoporosis Neg Hx     Rheumatologic disease Neg Hx     Scoliosis Neg Hx     Vascular Disease Neg Hx        Meds/Allergies       Current Facility-Administered Medications:     acetaminophen (TYLENOL) tablet 650 mg, 650 mg, Oral, Q6H PRN    aspirin (ECOTRIN LOW STRENGTH) EC tablet 81 mg, 81 mg, Oral, Daily, 81 mg at 11/14/23 0805    dicyclomine (BENTYL) tablet 20 mg, 20 mg, Oral, 4x Daily (AC & HS), 20 mg at 11/14/23 0800    DULoxetine (CYMBALTA) delayed release capsule 60 mg, 60 mg, Oral, Daily, 60 mg at 11/14/23 0814    enoxaparin (LOVENOX) subcutaneous injection 40 mg, 40 mg, Subcutaneous, Daily, 40 mg at 11/14/23 0805    gabapentin (NEURONTIN) capsule 300 mg, 300 mg, Oral, TID, 300 mg at 11/14/23 0806    ketorolac (TORADOL) injection 15 mg, 15 mg, Intravenous, Q6H PRN, 15 mg at 11/14/23 0804    levothyroxine tablet 50 mcg, 50 mcg, Oral, Daily, 50 mcg at 11/14/23 0841    magnesium Oxide (MAG-OX) tablet 400 mg, 400 mg, Oral, Daily, 400 mg at 11/14/23 0814    midodrine (PROAMATINE) tablet 10 mg, 10 mg, Oral, Daily, 10 mg at 11/14/23 0814    pantoprazole (PROTONIX) injection 40 mg, 40 mg, Intravenous, Q24H [address], 40 mg at 11/14/23 0806    QUEtiapine (SEROquel) tablet 100 mg, 100 mg, Oral, HS, 100 mg at 11/13/23 2143    sodium chloride 0.9 % infusion, 100 mL/hr, Intravenous, Continuous, 100 mL/hr at 11/13/23 2001    Allergies   Allergen Reactions    Meperidine Lightheadedness and Other (See Comments)    Sulfa Antibiotics Hives           Objective     Blood pressure 161/81, pulse 85, temperature 98.3 °F (36.8 °C), temperature source Oral, resp. rate 19, height 5' 5" (1.651 m), weight 78 kg (171 lb 15.3 oz), SpO2 93 %. Body mass index is 28.62 kg/m². PHYSICAL EXAM:      General Appearance:   Alert, cooperative, in mild distress due to abdominal pain   HEENT:   Normocephalic, atraumatic, anicteric   Neck:  Supple, symmetrical, trachea midline   Lungs:   Bilateral rhonchi noted on auscultation, left worse than right.     Heart:   Regular rate and rhythm; +S1/+S2 WNL, no murmurs, rubs or gallops   Abdomen:   Nondistended, with tenderness to palpation in the RUQ and RLQ, as well as suprapubic area. BS are diminished. Abdomen is tympanic to percussion in the suprapubic area.     Genitalia:   Deferred    Rectal:   Deferred    Extremities:  No cyanosis, clubbing or edema    Pulses:  2+ and symmetric    Skin:  No jaundice, rashes, or lesions          Lab Results:   Admission on 11/13/2023   Component Date Value    Ventricular Rate 11/13/2023 119     Atrial Rate 11/13/2023 119     [place] Interval 11/13/2023 148     QRSD Interval 11/13/2023 72     QT Interval 11/13/2023 312     QTC Interval 11/13/2023 438     P Axis 11/13/2023 55     QRS [place] 11/13/2023 -30     T Wave [place] 11/13/2023 48     WBC 11/13/2023 10.37 (H)     RBC 11/13/2023 5.10     Hemoglobin 11/13/2023 14.4     Hematocrit 11/13/2023 45.4     MCV 11/13/2023 89     MCH 11/13/2023 28.2     MCHC 11/13/2023 31.7     RDW 11/13/2023 14.0     MPV 11/13/2023 9.7     Platelets 21/88/0255 281     Sodium 11/13/2023 138     Potassium 11/13/2023 4.1     Chloride 11/13/2023 101     CO2 11/13/2023 29     ANION GAP 11/13/2023 8     BUN 11/13/2023 18     Creatinine 11/13/2023 0.90     Glucose 11/13/2023 92     Calcium 11/13/2023 9.7     AST 11/13/2023 42 (H)     ALT 11/13/2023 73 (H)     Alkaline Phosphatase 11/13/2023 92     Total Protein 11/13/2023 7.3     Albumin 11/13/2023 4.1     Total Bilirubin 11/13/2023 0.57     eGFR 11/13/2023 66     Lipase 11/13/2023 17     Color, UA 11/13/2023 Yellow     Clarity, UA 11/13/2023 Clear     Specific Gravity, UA 11/13/2023 1.026     pH, UA 11/13/2023 5.0     Leukocytes, UA 11/13/2023 Small (A)     Nitrite, UA 11/13/2023 Negative     Protein, UA 11/13/2023 Trace (A)     Glucose, UA 11/13/2023 Negative     Ketones, UA 11/13/2023 Trace (A)     Urobilinogen, UA 11/13/2023 2.0 (A)     Bilirubin, UA 11/13/2023 Negative     Occult Blood, UA 11/13/2023 Negative     Protime 11/13/2023 13.0     INR 11/13/2023 0.93     PTT 11/13/2023 27     Procalcitonin 11/13/2023 <0.05     LACTIC ACID 11/13/2023 1.3     Blood Culture 11/13/2023 Received in Microbiology Lab. Culture in Progress. Blood Culture 11/13/2023 Received in Microbiology Lab. Culture in Progress.      RBC, UA 11/13/2023 1-2     WBC, UA 11/13/2023 2-4 (A)     Epithelial Cells 11/13/2023 Occasional     Bacteria, UA 11/13/2023 Occasional     MUCUS THREADS 11/13/2023 Innumerable (A)     Hyaline Casts, UA 11/13/2023 0-3 (A)     Segmented % 11/13/2023 65     Bands % 11/13/2023 1     Lymphocytes % 11/13/2023 24     Monocytes % 11/13/2023 8     Eosinophils, % 11/13/2023 1     Basophils % 11/13/2023 1     Absolute Neutrophils 11/13/2023 6.84     Lymphocytes Absolute 11/13/2023 2.49     Monocytes Absolute 11/13/2023 0.83     Eosinophils Absolute 11/13/2023 0.10     Basophils Absolute 11/13/2023 0.10     RBC Morphology 11/13/2023 Normal     Platelet Estimate 10/09/9409 Adequate     Ventricular Rate 11/13/2023 89     Atrial Rate 11/13/2023 89     NC Interval 11/13/2023 160     QRSD Interval 11/13/2023 78     QT Interval 11/13/2023 368     QTC Interval 11/13/2023 447     P Axis 11/13/2023 59     QRS [place] 11/13/2023 -19     T Wave [place] 11/13/2023 36     SARS-CoV-2 11/13/2023 Negative     INFLUENZA A PCR 11/13/2023 Negative     INFLUENZA B PCR 11/13/2023 Negative     RSV PCR 11/13/2023 Negative     TSH 3RD GENERATON 11/13/2023 3.760     BNP 11/13/2023 14     Magnesium 11/13/2023 2.1     Magnesium 11/14/2023 2.1     Sodium 11/14/2023 136     Potassium 11/14/2023 4.3     Chloride 11/14/2023 105     CO2 11/14/2023 27     ANION GAP 11/14/2023 4     BUN 11/14/2023 16     Creatinine 11/14/2023 0.81     Glucose 11/14/2023 91     Calcium 11/14/2023 9.0     AST 11/14/2023 46 (H)     ALT 11/14/2023 73 (H)     Alkaline Phosphatase 11/14/2023 85     Total Protein 11/14/2023 6.6     Albumin 11/14/2023 3.7     Total Bilirubin 11/14/2023 0.74     eGFR 11/14/2023 75 Radiology Results:   PE Study with CT abdomen & pelvis with contrast    Result Date: 11/13/2023  Narrative: CT PULMONARY ANGIOGRAM OF THE CHEST AND CT ABDOMEN AND PELVIS WITH INTRAVENOUS CONTRAST INDICATION:   sob w/ exertion, tachycardia, bilateral rhonchi r.o PE, PNA. has crohns, ab pain x 3 days. r/o ab pathologies. COMPARISON: CT abdomen/pelvis 10/3/2023. CT chest 9/15/2023. MRI abdomen 7/25/2023. TECHNIQUE:  CT examination of the chest, abdomen and pelvis was performed. Thin section CT angiographic technique was used in the chest in order to evaluate for pulmonary embolus and coronal 3D MIP postprocessing was performed on the acquisition scanner. Axial, sagittal, and coronal 2D reformatted images were created from the source data and submitted for interpretation. Radiation dose length product (DLP) for this visit:  1077 mGy-cm . This examination, like all CT scans performed in the [facility], was performed utilizing techniques to minimize radiation dose exposure, including the use of iterative reconstruction and automated exposure control. IV Contrast:  100 mL of iohexol (OMNIPAQUE) Enteric Contrast: None FINDINGS: PULMONARY ARTERIAL TREE:  No filling defects to suggest acute or chronic pulmonary embolism in the entire pulmonary arterial system. Normal caliber main pulmonary artery. LARGE AIRWAYS: Large airways are clear with no tracheal or endobronchial lesion. LUNGS: Somewhat limited evaluation of the lung parenchyma due to respiratory motion artifact. There is suggestion of mild mosaic attenuation compatible with small vessel/small airways disease. No suspicious lesions. No consolidation. No edema. PLEURA: No pleural effusion or pneumothorax. HEART: Normal cardiac size and morphology. No Coronary artery calcification. No pericardial effusion or thickening. VESSELS: Normal caliber thoracic aorta with no detectable atherosclerotic plaque.  MEDIASTINUM AND LESLIE: Top-normal mediastinal lymph nodes measure up to 10 mm in short axis. No lymphadenopathy. Small hiatal hernia. CHEST WALL AND LOWER NECK:   Left chest wall implantable cardiac defibrillator. ABDOMEN: LIVER: Normal size and morphology. No suspicious lesion. BILIARY: No intrahepatic biliary ductal dilatation. Normal caliber common bile duct. GALLBLADDER: Gallbladder is surgically absent. SPLEEN: Within normal limits. No suspicious lesion. Normal spleen size. PANCREAS: Mildly atrophic. Known small pancreatic cysts better evaluated on prior MRI; see associated recommendations. No pancreatic/peripancreatic inflammation. ADRENAL GLANDS: Within normal limits. KIDNEYS/URETERS: Normal size and position. Symmetric enhancement. No suspicious lesion. No calcified stones or hydronephrosis. Ureters within normal limits. STOMACH AND BOWEL: Stomach is grossly within normal limits. Normal caliber small bowel. Normal caliber large bowel. No evidence of active small or large bowel inflammatory process. APPENDIX: No findings to suggest acute appendicitis. ABDOMINOPELVIC CAVITY: No ascites. No intraperitoneal free air. No lymphadenopathy. No retroperitoneal hematoma. VESSELS: Normal caliber abdominal aorta with no detectable atherosclerotic plaque. The celiac, SMA, and LIAM are patent. The main, right, and left portal veins are patent. The SMV and splenic vein are patent. The hepatic veins are patent. PELVIS REPRODUCTIVE ORGANS: Anteverted uterus. There is no evidence of adnexal mass. URINARY BLADDER: Within normal limits. No calculi. ABDOMINAL WALL/INGUINAL REGIONS: Within normal limits. BONES: Mild multilevel degenerative changes in the spine. Vertebral body height is maintained. No acute fracture or destructive osseous lesion. Degenerative changes at the hips. Impression: 1. No filling defect to suggest acute or chronic pulmonary embolism in the entire pulmonary arterial system.  2.  Suggestion of mosaic lung parenchymal attenuation compatible with mild small vessel/small airways disease. 3.  No acute findings in the abdomen or pelvis. Workstation performed: [id number]     XR chest pa & lateral    Result Date: 11/8/2023  Narrative: CHEST INDICATION:   R05.1: Acute cough. COMPARISON: Compared with 1/26/2023 EXAM PERFORMED/VIEWS:  XR CHEST PA & LATERAL FINDINGS: Left lower chest loop recorder. Cardiomediastinal silhouette appears unremarkable. The lungs are clear. No pneumothorax or pleural effusion. Osseous structures appear within normal limits for patient age. Impression: No acute cardiopulmonary disease. Workstation performed: [id number]       The patient was seen and examined by Dr. Aamir Delgado, all nassar medical decisions were made with Dr. Aamir Delgado. Thank you for allowing us to participate in the care of this pleasant patient. We will follow up with you closely.

## 2023-11-14 NOTE — ASSESSMENT & PLAN NOTE
Pt presented with mildly elevated LFTs AST 42, ALT 73  This morning AST 46, ALT 73  Follows with GI outpatient who are pursuing workup - DEV elevated but anti-smooth muscle and antimitochondrial antibodies normal. No alcohol in more than 20 years.  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

## 2023-11-14 NOTE — UTILIZATION REVIEW
Initial Clinical Review    Admission: Date/Time/Statement: 11/13/23 1533 observation AND CHANGED 11/15/23 1432 DUE TO PERSISENT ABDOMINAL DISCOMFORT AND EPISODE OF NAUSEA REQUIRING ONGOING IVF, ANTIEMETICS  AS NEEDED. GI ON CONSULT. Admission Orders (From admission, onward)       Ordered        11/15/23 1432  Inpatient Admission  Once                          Orders Placed This Encounter   Procedures    Inpatient Admission     Standing Status:   Standing     Number of Occurrences:   1     Order Specific Question:   Level of Care     Answer:   Med Surg [16]     Order Specific Question:   Estimated length of stay     Answer:   More than 2 Midnights     Order Specific Question:   Certification     Answer:   I certify that inpatient services are medically necessary for this patient for a duration of greater than two midnights. See H&P and MD Progress Notes for additional information about the patient's course of treatment. ED Arrival Information       Expected   -    Arrival   11/13/2023 10:11    Acuity   Emergent              Means of arrival   Walk-In    Escorted by   Self    Service   Hospitalist    Admission type   Emergency              Arrival complaint   Shortness of Breath, Abdominal Pain             Chief Complaint   Patient presents with    Abdominal Pain     Burning pain in lower stomach that started on Saturday. Has been increasingly getting worse. Also having shortness of breath. Denies CP. Initial Presentation: 80 y.o. female from home to ED admitted to observation [address]   due to abdominal pain/shortness of breath. PMH of Crohn's on infliximab. Presented due to lower abdominal pain, nausea, shortness of breath starting 3 days prior to arrival with nausea.  + dyspnea with exertion. Loose stools. Recently completed course of prednisone. On exam: acute distress. Tachycardia. Tachypnea. Lungs rhonchi. Wbc 10.37.   ct showed small vessel airway disease.   In the ED given Zofran, Dilaudid x 2, IVF bolus. Post ED treatment, still in pain. Plan is clears,  analgesia and antiemetics as needed. Check stool studies. IV pantoprazole and continue IVF. Check ambulatory pulse ox, OP pulmonary referral.      11/14/23 observation:  has continued abdominal pain, rates 10/10 and located right mid abdomen. On exam: abdomen soft. Tenderness upper abdominal quadrants. AST 46. ALT 73.    Stool studies pending (ordered:  stool enteric bacterial panel,  C diff, ova and parasite, Giardia antigen and calprotectin, Fecal), continue IVF, pain control. GI consulted. 11/14/23 per GI - patient has postprandial epigastric abdominal pain radiating to RUQ and differential is Peptic Ulcer disease or Gastritis vs less likely flare of Crohn's vs infectious gastroenteritis vs transient SBO due to adhesions. Plan is follow stool studies, fecal calprotectin and C reactive protein. Pantoprazole twice daily, Carafate, egd tomorrow. If egd unremarkable and still has pain then small bowel follow through. Avoid NSAIDs and Narcotics. 11/15/23 CHANGED TO INPATIENT:   overnight with nausea. Has some suprapubic, RLQ abdominal apin. Epigastric pain improved. Last BM 11/12. No flatus. On exam: lungs rhonchi. Abdomen tender to epigastric region, suprapubic and RLQ areas. Bun 26. Creatinine 0.70.   plan is check US RUQ. EGD on hold. Follow stool studies. Start bowel regimen. Continue IVF, pain control     11/16/23 after ct enterography yesterday, had 3 episodes of diarrhea. Has continued RLQ, suprapubic pain. Epigastric pain improved. Some improvement in cough. On exam bilateral rhonchi, improved. Tender to palpation suprapubic  and RLQ region. C diff negative. Na 134. Wbc 4.28.  ova and parasite, Giardia, fecal calprotectin pending. Continue IVF, Zofran as needed. Clears.          ED Triage Vitals [11/13/23 1016]   Temperature Pulse Respirations Blood Pressure SpO2   (!) 97.3 °F (36.3 °C) (!) 120 22 122/79 98 %      Temp Source Heart Rate Source Patient Position - Orthostatic VS BP Location FiO2 (%)   Axillary Monitor Sitting Right arm --      Pain Score       8          Wt Readings from Last 1 Encounters:   11/14/23 77.6 kg (171 lb)     Additional Vital Signs:   1/16/23 08:31:03 97.6 °F (36.4 °C) 87 16 116/83 94 97 % -- --   11/15/23 21:24:02 98.1 °F (36.7 °C) 83 19 152/94 113 95 % -- --   11/15/23 15:23:24 98 °F (36.7 °C) 94 19 112/85 94 92 %       11/15/23 08:34:53 98.1 °F (36.7 °C) 86 17 129/83 98 94 %     11/14/23 22:08:35 97.8 °F (36.6 °C) 87 20 118/77 91 93 % None (Room air) Lying   11/14/23 2058 -- -- -- -- -- 93 % None (Room air) --   11/14/23 17:55:21 97.7 °F (36.5 °C) 89 20 131/82 98 88 % Abnormal  None (Room air) Lying   11/14/23 1459 -- -- -- -- -- 94 % None (Room air)      11/14/23 0700 98.3 °F (36.8 °C) 85 19 161/81 114 93 % None (Room air) Lying   11/13/23 2147 97.6 °F (36.4 °C) 82 18 141/74 101 92 % None (Room air) Lying   11/13/23 1753 97.6 °F (36.4 °C) 80 18 155/89 113 99 % None (Room air) Lying   11/13/23 1540 -- 95 20 141/88 128 98 % None (Room air) Sitting   11/13/23 1500 -- 92 -- -- -- -- -- --   11/13/23 1300 -- 102 18 114/80 91 97 % None (Room air)      Pertinent Labs/Diagnostic Test Results:   US right upper quadrant   Final Result by Magi Cardenas MD (11/15 2048)      Moderate hepatic steatosis. Otherwise normal right upper quadrant ultrasound exam.      Workstation performed: [id number]         CT small bowel enterography   Final Result by Magi Cardenas MD (11/15 2156)   *  Inflammation: No evidence of active small bowel Crohn's disease. However, there is a 15 cm long segment of sigmoid colon which shows mild wall thickening, increased inner wall enhancement and mild engorgement of the adjacent vasa recta likely    representing mild active Crohn's colitis. 1   *  Stricture: None. *  Penetrating complication: None.    * Perianal disease: None. *  Other complications: None. Workstation performed: [id number]         PE Study with CT abdomen & pelvis with contrast   Final Result by Pamela Gibbons MD (11/13 1430)      1. No filling defect to suggest acute or chronic pulmonary embolism in the entire pulmonary arterial system. 2.  Suggestion of mosaic lung parenchymal attenuation compatible with mild small vessel/small airways disease. 3.  No acute findings in the abdomen or pelvis. Workstation performed: [id number]             11/13/23 ecg - Sinus tachycardia   Left axis deviation   Moderate voltage criteria for LVH, may be normal variant   Abnormal ECG   When compared with ECG of 01-MAY-2023 13:26,   No significant change was found     11/13/23 ecg Normal sinus rhythm   Minimal voltage criteria for LVH, may be normal variant   Borderline ECG   When compared with ECG of 13-NOV-2023 10:23, (unconfirmed)   No significant change was found     11/14/23 echo  Left Ventricle: Left ventricular cavity size is normal. Wall thickness is normal. The left ventricular ejection fraction is 60%. Systolic function is normal. Wall motion is normal. Diastolic function is normal.  Left atrial filling pressure is normal.    Left Atrium: The atrium is normal in size. IVC/SVC: The right atrial pressure is estimated at 3.0 mmHg. The inferior vena cava is normal in size.  Respirophasic changes were normal.  Results from last 7 days   Lab Units 11/13/23  1845   SARS-COV-2  Negative     Results from last 7 days   Lab Units 11/16/23  0614 11/15/23  0619 11/13/23  1123   WBC Thousand/uL 4.28* 8.42 10.37*   HEMOGLOBIN g/dL 12.3 13.2 14.4   HEMATOCRIT % 38.2 42.3 45.4   PLATELETS Thousands/uL 247 205 281   NEUTROS ABS Thousands/µL 2.62 6.73  --    BANDS PCT %  --   --  1     Results from last 7 days   Lab Units 11/15/23  0619 11/14/23  0556 11/13/23  1123   SODIUM mmol/L 136 136 138   POTASSIUM mmol/L 4.8 4.3 4.1   CHLORIDE mmol/L 104 105 101   CO2 mmol/L 28 27 29   ANION GAP mmol/L 4 4 8   BUN mg/dL 26* 16 18   CREATININE mg/dL 0.70 0.81 0.90   EGFR ml/min/1.73sq m 89 75 66   CALCIUM mg/dL 8.8 9.0 9.7   MAGNESIUM mg/dL  --  2.1 2.1     Results from last 7 days   Lab Units 11/15/23  0619 11/14/23  0556 11/13/23  1123   AST U/L 55* 46* 42*   ALT U/L 80* 73* 73*   ALK PHOS U/L 85 85 92   TOTAL PROTEIN g/dL 6.3* 6.6 7.3   ALBUMIN g/dL 3.5 3.7 4.1   TOTAL BILIRUBIN mg/dL 0.78 0.74 0.57     Results from last 7 days   Lab Units 11/15/23  0619 11/14/23  0556 11/13/23  1123   GLUCOSE RANDOM mg/dL 112 91 92     Results from last 7 days   Lab Units 11/13/23  1138   PROTIME seconds 13.0   INR  0.93   PTT seconds 27     Results from last 7 days   Lab Units 11/15/23  0619 11/13/23  1123   TSH 3RD GENERATON uIU/mL 1.432 3.760     Results from last 7 days   Lab Units 11/13/23  1123   PROCALCITONIN ng/ml <0.05     Results from last 7 days   Lab Units 11/13/23  1138   LACTIC ACID mmol/L 1.3     Results from last 7 days   Lab Units 11/13/23  1123   BNP pg/mL 14     Results from last 7 days   Lab Units 11/13/23  1123   LIPASE u/L 17     Results from last 7 days   Lab Units 11/13/23  1126   CLARITY UA  Clear   COLOR UA  Yellow   SPEC GRAV UA  1.026   PH UA  5.0   GLUCOSE UA mg/dl Negative   KETONES UA mg/dl Trace*   BLOOD UA  Negative   PROTEIN UA mg/dl Trace*   NITRITE UA  Negative   BILIRUBIN UA  Negative   UROBILINOGEN UA (BE) mg/dl 2.0*   LEUKOCYTES UA  Small*   WBC UA /hpf 2-4*   RBC UA /hpf 1-2   BACTERIA UA /hpf Occasional   EPITHELIAL CELLS WET PREP /hpf Occasional   MUCUS THREADS  Innumerable*     Results from last 7 days   Lab Units 11/13/23  1845   INFLUENZA A PCR  Negative   INFLUENZA B PCR  Negative   RSV PCR  Negative     Results from last 7 days   Lab Units 11/13/23  1123   BLOOD CULTURE  No Growth at 48 hrs. No Growth at 48 hrs.        ED Treatment:   Medication Administration from 11/13/2023 8633 to 11/13/2023 4953         Date/Time Order Dose Route Action Comments     11/13/2023 1140 EST ondansetron (ZOFRAN) injection 4 mg 4 mg Intravenous Given --     11/13/2023 1141 EST sodium chloride 0.9 % bolus 1,000 mL 1,000 mL Intravenous New Bag --     11/13/2023 1140 EST HYDROmorphone HCl (DILAUDID) injection 0.2 mg 0.2 mg Intravenous Given --     11/13/2023 1539 EST HYDROmorphone (DILAUDID) injection 0.5 mg 0.5 mg Intravenous Given --          Past Medical History:   Diagnosis Date    Abdominal adhesions     Anesthesia complication     difficult to wake up    Anxiety     Arthritis     Cancer ([address])     melanoma    Colon polyp     Depression     Depression     Disease of thyroid gland     Fibromyalgia     Fibromyalgia, primary     Fractures 2006    GERD (gastroesophageal reflux disease)     History of cholecystectomy 09/07/2019    Hyperlipidemia     Irregular heart beat     can be tachy; also has orthoststic hypotension    Lazy eye     resolved: 3/27/17    Medical clearance for psychiatric admission 07/13/2021    Melanoma ([address]) 2010    Osteoarthritis 07/2018    Osteopenia     with joint pain-elevated DEV    Psychiatric disorder     Shortness of breath     assoc with tachycardia    Stomach disorder 1995    Tinnitus     Wears glasses     for reading     Present on Admission:   Generalized abdominal pain   Adult hypothyroidism   Orthostatic hypotension      Admitting Diagnosis: Hiatal hernia [K44.9]  Dyspnea [R06.00]  SOB (shortness of breath) [R06.02]  Abdominal pain [R10.9]  Transaminitis [R74.01]  Mediastinal lymphadenopathy [R59.0]  Crohn's disease of both small and large intestine without complication (HCC) [V48.35]  Age/Sex: 80 y.o. female  Admission Orders:  Scheduled Medications:  aspirin, 81 mg, Oral, Daily  dicyclomine, 20 mg, Oral, 4x Daily (AC & HS) dc 11/16 at 0747  DULoxetine, 60 mg, Oral, Daily  enoxaparin, 40 mg, Subcutaneous, Daily  gabapentin, 300 mg, Oral, TID  levothyroxine, 50 mcg, Oral, Daily  magnesium Oxide, 400 mg, Oral, Daily  midodrine, 10 mg, Oral, Daily  pantoprazole, 40 mg, Intravenous, Q24H KIRBY  QUEtiapine, 100 mg, Oral, HS    acetaminophen (TYLENOL) tablet 650 mg and increased to 975 mg on 11/15/23 every 8 hours. Dose: 650 mg  Freq: Every 6 hours scheduled Route: PO  Indications of Use: FEVER,HEADACHE,MILD PAIN  Start: 11/14/23 1245     senna-docusate sodium (SENOKOT S) 8.6-50 mg per tablet 1 tablet  Dose: 1 tablet  Freq: 2 times daily Route: PO  Start: 11/15/23 1800         Continuous IV Infusions:  sodium chloride, 100 mL/hr, Intravenous, Continuous      PRN Meds:  acetaminophen, 650 mg, Oral, Q6H PRN  ketorolac, 15 mg, Intravenous, Q6H PRN x 1 11/13, x 2 11/14    HYDROmorphone (DILAUDID) injection 0.5 mg x 1 11/14   Dose: 0.5 mg  Freq: Every 4 hours PRN Route: IV  PRN Reason: breakthrough pain  Start: 11/14/23 1231 End: 11/15/23 0605     ondansetron (ZOFRAN) oral solution 4 mg x 1 11/14  Dose: 4 mg  Freq: Every 6 hours PRN Route: PO  PRN Reasons: nausea,vomiting  Start: 11/14/23 1840     Pulse oximetry with ambulation    IP CONSULT TO GASTROENTEROLOGY    Network Utilization Review Department  ATTENTION: Please call with any questions or concerns to [phone] and carefully listen to the prompts so that you are directed to the right person. All voicemails are confidential.   For Discharge needs, contact Care Management DC Support Team at [phone] opt. 2  Send all requests for admission clinical reviews, approved or denied determinations and any other requests to dedicated fax number below belonging to the campus where the patient is receiving treatment.  List of dedicated fax numbers for the Facilities:  [place] DENIALS (Administrative/Medical Necessity) [phone]   DISCHARGE SUPPORT TEAM (NETWORK) [id number] [address] (Maternity/NICU/Pediatrics) [phone]   [address] [place] [address] Drive [phone]   [facility] [phone] [address] [address] [facility] [address] [facility] [facility] [address] [address] [facility] [phone]

## 2023-11-14 NOTE — PLAN OF CARE
Problem: PAIN - ADULT  Goal: Verbalizes/displays adequate comfort level or baseline comfort level  Description: Interventions:  - Encourage patient to monitor pain and request assistance  - Assess pain using appropriate pain scale  - Administer analgesics based on type and severity of pain and evaluate response  - Implement non-pharmacological measures as appropriate and evaluate response  - Consider cultural and social influences on pain and pain management  - Notify physician/advanced practitioner if interventions unsuccessful or patient reports new pain  Outcome: Not Progressing     Problem: GASTROINTESTINAL - ADULT  Goal: Minimal or absence of nausea and/or vomiting  Description: INTERVENTIONS:  - Administer IV fluids if ordered to ensure adequate hydration  - Maintain NPO status until nausea and vomiting are resolved  - Nasogastric tube if ordered  - Administer ordered antiemetic medications as needed  - Provide nonpharmacologic comfort measures as appropriate  - Advance diet as tolerated, if ordered  - Consider nutrition services referral to assist patient with adequate nutrition and appropriate food choices  Outcome: Not Progressing

## 2023-11-14 NOTE — CASE MANAGEMENT
Case Management Assessment    Patient name [facility]  Location /-90 MRN [id number]  : 1955 Date 2023       Current Admission Date: 2023  Current Admission Diagnosis:Generalized abdominal pain   Patient Active Problem List    Diagnosis Date Noted    SOB (shortness of breath) on exertion 2023    Cramping of hands 2023    Subchondral insufficiency fracture of condyle of right femur ([address]) 2023    Crohn's disease ([address]) 2023    HTN (hypertension) 2023    Hyperlipidemia 2023    Diarrhea 2023    TIA (transient ischemic attack) 2022    Depression     Acute metabolic encephalopathy     Anxiety and depression 11/10/2022    Cancer ([address]) 11/10/2022    Glaucoma 11/10/2022    Major depression, single episode 11/10/2022    Osteoporosis 52/10/8345    Marital conflict     Recurrent major depressive disorder, in full remission ([address]) 06/15/2022    Polymyalgia ([address]) 02/10/2022    Major depressive disorder, recurrent, in partial remission ([address]) 2021    Gambling disorder, moderate 2021    Primary insomnia 2021    Insomnia related to another mental disorder 2021    Osteoarthritis of knee 11/10/2021    PTSD (post-traumatic stress disorder) 2021    Medical clearance for psychiatric admission 2021    Peripheral neuropathy 03/10/2021    Mild asthma 2020    Generalized abdominal pain 10/23/2020    Leg weakness, bilateral 10/23/2020    Epigastric abdominal pain 10/08/2020    History of vertigo 2020    Dizziness and giddiness 2020    Migraine without aura and without status migrainosus, not intractable 2020    Adult hypothyroidism 2019    Ambulatory dysfunction 2019    History of cholecystectomy 2019    Psychogenic weakness 2019    Fibromyalgia 2019    Generalized anxiety disorder 2019    Herniated cervical disc 2019    History of melanoma 04/11/2019    BMI 27.0-27.9,adult 04/11/2019    Lesion of lachelle 03/19/2019    Right sided temporal headache 03/19/2019    Cervical myelopathy with cervical radiculopathy  02/14/2019    History of recent intraspinal surgery 02/14/2019    Orthostatic hypotension 02/14/2019    History of migraine headaches 02/14/2019    Mixed dyslipidemia 02/14/2019    Cervical spinal stenosis 12/31/2018    Cervical spondylosis without myelopathy 11/20/2018    Gastroesophageal reflux disease with esophagitis without hemorrhage 06/04/2018    Focal neurological deficit 03/03/2018    Radiculopathy of lumbar region 02/28/2018    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    Osteopenia 03/27/2017    Allergic rhinitis 08/04/2016    Alcohol dependence in remission ([address]) 04/19/2016    Arthropathy of multiple sites 09/04/2012    Severe episode of recurrent major depressive disorder, without psychotic features ([address]) 09/04/2012    Irritable bowel syndrome 09/04/2012    Raynaud's syndrome without gangrene 09/04/2012      LOS (days): 0  Geometric Mean LOS (GMLOS) (days):   Days to GMLOS:     OBJECTIVE:              Current admission status: Observation       Preferred Pharmacy:   [facility], [address] [place] [address]  Phone: [phone] Fax: [phone]    Primary Care Provider: Hay Poe MD    Primary Insurance: BLUE CROSS  Secondary Insurance:     ASSESSMENT:  [facility], [address] [facility] Representative - Spouse   Primary Phone: [phone] (Mobile)  Home Phone: [phone]                                Patient Information  Admitted from[de-identified] Home  Mental Status: Alert  During Assessment patient was accompanied by: Not accompanied during assessment  Assessment information provided by[de-identified] Patient  Primary Caregiver: Self  Support Systems: Spouse/significant other, Self  What city do you live in?: [address] [place] [address] entry access options.  Select all that apply.: Ramp  Type of Current Residence: [place]  In the last 12 months, was there a time when you were not able to pay the mortgage or rent on time?: No  In the last 12 months, how many places have you lived?: 1  In the last 12 months, was there a time when you did not have a steady place to sleep or slept in a shelter (including now)?: No  Living Arrangements: Lives w/ Spouse/significant other    Activities of Daily Living Prior to Admission  Functional Status: Independent  Completes ADLs independently?: Yes  Ambulates independently?: Yes  Does patient use assisted devices?: Yes  Assisted Devices (DME) used: Cullen Sprain  Does patient currently own DME?: Yes  What DME does the patient currently own?: Cullen Sprain  Does patient have a history of Outpatient Therapy (PT/OT)?: No  Does the patient have a history of Short-Term Rehab?: Yes  Does patient have a history of HHC?: Yes  Does patient currently have [address]?: No         Patient Information Continued  Income Source: Pension/long term  Does patient have prescription coverage?: Yes  Within the past 12 months, you worried that your food would run out before you got the money to buy more.: Never true  Within the past 12 months, the food you bought just didn't last and you didn't have money to get more.: Never true  Does patient receive dialysis treatments?: No  Does patient have a history of substance abuse?: No  Does patient have a history of Mental Health Diagnosis?: Yes  Is patient receiving treatment for mental health?: Yes (Dr Bia Rosen)  Has patient received inpatient treatment related to mental health in the last 2 years?: Yes         Means of Transportation  Means of Transport to Appts[de-identified] Family transport  In the past 12 months, has lack of transportation kept you from medical appointments or from getting medications?: No  In the past 12 months, has lack of transportation kept you from meetings, work, or from getting things needed for daily living?: No

## 2023-11-14 NOTE — PROGRESS NOTES
[id number] [facility]  Progress Note  Name: Brooke Anne  MRN: [id number]  Unit/Bed#: -01 I Date of Admission: 11/13/2023   Date of Service: 11/14/2023 I Hospital Day: 0    Assessment/Plan   * Generalized abdominal pain  Assessment & Plan  Presented with generalized burning abdominal pain, nausea, and loose stools. Diagnosed with Crohn's in May 2023 - treated with Infliximab infusions every 8 weeks.  Last infusion 9/29, incomplete dose due to loss of IV access   Pt now describes constant dull, achey pain in RLQ   Reports many loose stools with mucus on Saturday, decreased stools on Sunday - has not had a BM in hospital   She reports common fecal urgency at home   She has decreased appetite and oral hydration  She denies sick contacts or recent travel  CTA PE w/ AP- No intraabdominal process seen   DDx including but not limited to: Crohn's Flare, gastroenteritis, gastritis, PUD, GERD, gastroparesis, hepatitis, pancreatitis, colitis, enteritis, food poisoning, mesenteric adenitis, IBD, IBS, ileus, bowel obstruction, cholecystitis, biliary colic, choledocholithiasis, constipation    Plan:  Plan for EGD 11/15, NPO at midnight   Follow up labs:  Fecal Calprotectin  Stool Enteric Panel  Ova and Parasite   C Diff   Pain control: Toradol, Tylenol, and Bentyl - IV Dilaudid 0.5 mg Q4 PRN breakthrough  GI on board, appreciate recommendations     SOB (shortness of breath) on exertion  Assessment & Plan  Presented with few day history of dyspnea on exertion, nonproductive cough, and inability to lie flat  Pt sleeps with 2 pillows at home   BNP normal 14  Saturating well on RA   Bilateral crackles on exam   CTA PE Study- Negative for PE, mosaic pattern in the lung parenchyma concerning for small vessel/ small airway disease  Per PCP-patient has been prescribed nebulized treatments and inhalers for this intermittent episodes of SOB  Echo - LVEF 60%, normal function  Currently saturating well on RA Plan:   Monitor oxygenation on room air   Imaging reviewed with pulmonology, recommend outpatient referral on discharge     Elevated LFTs  Assessment & Plan  Pt presented with mildly elevated LFTs AST 42, ALT 73  This morning AST 46, ALT 73  Follows with GI outpatient who are pursuing workup - DEV elevated but anti-smooth muscle and antimitochondrial antibodies normal. No alcohol in more than 20 years. 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   Also follows with rheumatologist outpatient     Plan:  Continue to trend LFTs    Adult hypothyroidism  Assessment & Plan  Home regimen Levothyroxine 50mcg QD   Recent symptoms of multiple loose stools over the weekend  TSH normal    Plan:  Continue home regimen    Orthostatic hypotension  Assessment & Plan  Home regimen includes Midodrine 10mg BID   BP this morning 161/81, most recent /72    Plan:  Continue midodrine 10mg daily rather than BID due to some elevated BPs           VTE Pharmacologic Prophylaxis: VTE Score: 3 Moderate Risk (Score 3-4) - Pharmacological DVT Prophylaxis Ordered: enoxaparin (Lovenox). Mobility:      HLM Goal achieved. Continue to encourage appropriate mobility. Patient Centered Rounds: I performed bedside rounds with nursing staff today. Discussions with Specialists or Other Care Team Provider: GI    Education and Discussions with Family / Patient: Attempted to update  () via phone. Unable to contact. Current Length of Stay: 0 day(s)  Current Patient Status: Observation   Certification Statement: The patient will continue to require additional inpatient hospital stay due to further workup pending, pain control   Discharge Plan:  TBD    Code Status: Level 1 - Full Code    Subjective:   Pt was examined at bedside today. She appears uncomfortable in bed. She reports constant dull, achey pain in RLQ abdomen. Denies nausea and vomiting. Has not had a BM in hospital. Complains of wheezing and cough last few days. Also chronic back pain. Denies chest pain, palpitations, muscle aches, headaches, blood in stool, pain with urination, other complaints at this time. Objective:     Vitals:   Temp (24hrs), Av.8 °F (36.6 °C), Min:97.6 °F (36.4 °C), Max:98.3 °F (36.8 °C)    Temp:  [97.6 °F (36.4 °C)-98.3 °F (36.8 °C)] 97.7 °F (36.5 °C)  HR:  [80-91] 91  Resp:  [18-19] 19  BP: (135-161)/(72-89) 135/72  SpO2:  [91 %-99 %] 91 %  Body mass index is 28.46 kg/m². Input and Output Summary (last 24 hours):   No intake or output data in the 24 hours ending 23 1545    Physical Exam:   Physical Exam  Vitals reviewed. Constitutional:       Appearance: Normal appearance. HENT:      Head: Normocephalic and atraumatic. Nose: Nose normal.      Mouth/Throat:      Mouth: Mucous membranes are moist.      Pharynx: Oropharynx is clear. Cardiovascular:      Rate and Rhythm: Normal rate and regular rhythm. Pulses: Normal pulses. Heart sounds: Normal heart sounds. Pulmonary:      Effort: Pulmonary effort is normal. No respiratory distress. Comments: Expiratory wheezes, bilateral crackles   Abdominal:      General: Bowel sounds are normal. There is no distension. Palpations: Abdomen is soft. Tenderness: There is abdominal tenderness (LUQ directly below ribcage, RLQ). There is guarding. There is no rebound. Musculoskeletal:         General: Tenderness (over left shin) present. Normal range of motion. Cervical back: Normal range of motion. Right lower leg: No edema. Left lower leg: No edema. Skin:     General: Skin is warm and dry. Neurological:      Mental Status: She is alert and oriented to person, place, and time. Psychiatric:         Mood and Affect: Mood normal.         Behavior: Behavior normal.         Thought Content:  Thought content normal.         Judgment: Judgment normal.         Additional Data:     Labs:  Results from last 7 days   Lab Units 23  1123   WBC Thousand/uL 10.37*   HEMOGLOBIN g/dL 14.4   HEMATOCRIT % 45.4   PLATELETS Thousands/uL 281   BANDS PCT % 1   LYMPHO PCT % 24   MONO PCT % 8   EOS PCT % 1     Results from last 7 days   Lab Units 11/14/23  0556   SODIUM mmol/L 136   POTASSIUM mmol/L 4.3   CHLORIDE mmol/L 105   CO2 mmol/L 27   BUN mg/dL 16   CREATININE mg/dL 0.81   ANION GAP mmol/L 4   CALCIUM mg/dL 9.0   ALBUMIN g/dL 3.7   TOTAL BILIRUBIN mg/dL 0.74   ALK PHOS U/L 85   ALT U/L 73*   AST U/L 46*   GLUCOSE RANDOM mg/dL 91     Results from last 7 days   Lab Units 11/13/23  1138   INR  0.93             Results from last 7 days   Lab Units 11/13/23  1138 11/13/23  1123   LACTIC ACID mmol/L 1.3  --    PROCALCITONIN ng/ml  --  <0.05       Lines/Drains:  Invasive Devices       Peripheral Intravenous Line  Duration             Peripheral IV 11/13/23 Left Forearm <1 day                          Imaging: Reviewed radiology reports from this admission including: chest CT scan and abdominal/pelvic CT  PE Study with CT abdomen & pelvis with contrast    Result Date: 11/13/2023  Impression: 1. No filling defect to suggest acute or chronic pulmonary embolism in the entire pulmonary arterial system. 2.  Suggestion of mosaic lung parenchymal attenuation compatible with mild small vessel/small airways disease. 3.  No acute findings in the abdomen or pelvis. Workstation performed: [id number]       No Chest XR results available for this patient. Recent Cultures (last 7 days):   Results from last 7 days   Lab Units 11/13/23  1123   BLOOD CULTURE  Received in Microbiology Lab. Culture in Progress. Received in Microbiology Lab. Culture in Progress.        Last 24 Hours Medication List:   Current Facility-Administered Medications   Medication Dose Route Frequency Provider Last Rate    acetaminophen  650 mg Oral Q6H Vinay Beckwith DO      aspirin  81 mg Oral Daily Nikia Mejias MD      dicyclomine  20 mg Oral 4x Daily (AC & HS) Nikia Mejias MD DULoxetine  60 mg Oral Daily Al Vanessa MD      enoxaparin  40 mg Subcutaneous Daily Al Vanessa MD      gabapentin  300 mg Oral TID Al Vanessa MD      HYDROmorphone  0.5 mg Intravenous Q4H PRN Nell Hunter DO      ketorolac  15 mg Intravenous Q6H PRN Al Vanessa MD      levothyroxine  50 mcg Oral Daily Al Vanessa MD      magnesium Oxide  400 mg Oral Daily Al Vanessa MD      midodrine  10 mg Oral Daily Al Vanessa MD      pantoprazole  40 mg Intravenous Q24H [address] Al Vanessa MD      QUEtiapine  100 mg Oral HS Al Vanessa MD      sodium chloride  100 mL/hr Intravenous Continuous Al Vanessa  mL/hr (11/13/23 2001)        Today, Patient Was Seen By: Nell Hunter DO     **Please Note: This note may have been constructed using a voice recognition system. **

## 2023-11-15 ENCOUNTER — APPOINTMENT (INPATIENT)
Dept: ULTRASOUND IMAGING | Facility: HOSPITAL | Age: 68
DRG: 387 | End: 2023-11-15
Payer: COMMERCIAL

## 2023-11-15 ENCOUNTER — APPOINTMENT (INPATIENT)
Dept: CT IMAGING | Facility: HOSPITAL | Age: 68
DRG: 387 | End: 2023-11-15
Payer: COMMERCIAL

## 2023-11-15 LAB
A1AT SERPL-MCNC: 148 MG/DL (ref 101–187)
ACTIN IGG SERPL-ACNC: 16 UNITS (ref 0–19)
ALBUMIN SERPL BCP-MCNC: 3.5 G/DL (ref 3.5–5)
ALP SERPL-CCNC: 85 U/L (ref 34–104)
ALT SERPL W P-5'-P-CCNC: 80 U/L (ref 7–52)
ANION GAP SERPL CALCULATED.3IONS-SCNC: 4 MMOL/L
AST SERPL W P-5'-P-CCNC: 55 U/L (ref 13–39)
BASOPHILS # BLD AUTO: 0.02 THOUSANDS/ÂΜL (ref 0–0.1)
BASOPHILS NFR BLD AUTO: 0 % (ref 0–1)
BILIRUB SERPL-MCNC: 0.78 MG/DL (ref 0.2–1)
BUN SERPL-MCNC: 26 MG/DL (ref 5–25)
CALCIUM SERPL-MCNC: 8.8 MG/DL (ref 8.4–10.2)
CERULOPLASMIN SERPL-MCNC: 24.9 MG/DL (ref 19–39)
CHLORIDE SERPL-SCNC: 104 MMOL/L (ref 96–108)
CO2 SERPL-SCNC: 28 MMOL/L (ref 21–32)
CREAT SERPL-MCNC: 0.7 MG/DL (ref 0.6–1.3)
EOSINOPHIL # BLD AUTO: 0.06 THOUSAND/ÂΜL (ref 0–0.61)
EOSINOPHIL NFR BLD AUTO: 1 % (ref 0–6)
ERYTHROCYTE [DISTWIDTH] IN BLOOD BY AUTOMATED COUNT: 13.8 % (ref 11.6–15.1)
GFR SERPL CREATININE-BSD FRML MDRD: 89 ML/MIN/1.73SQ M
GLUCOSE P FAST SERPL-MCNC: 112 MG/DL (ref 65–99)
GLUCOSE SERPL-MCNC: 112 MG/DL (ref 65–140)
HAV IGM SER QL: NORMAL
HBV CORE IGM SER QL: NORMAL
HBV SURFACE AG SER QL: NORMAL
HCT VFR BLD AUTO: 42.3 % (ref 34.8–46.1)
HCV AB SER QL: NORMAL
HGB BLD-MCNC: 13.2 G/DL (ref 11.5–15.4)
IMM GRANULOCYTES # BLD AUTO: 0.05 THOUSAND/UL (ref 0–0.2)
IMM GRANULOCYTES NFR BLD AUTO: 1 % (ref 0–2)
LYMPHOCYTES # BLD AUTO: 0.78 THOUSANDS/ÂΜL (ref 0.6–4.47)
LYMPHOCYTES NFR BLD AUTO: 9 % (ref 14–44)
MCH RBC QN AUTO: 28.6 PG (ref 26.8–34.3)
MCHC RBC AUTO-ENTMCNC: 31.2 G/DL (ref 31.4–37.4)
MCV RBC AUTO: 92 FL (ref 82–98)
MITOCHONDRIA M2 IGG SER-ACNC: <20 UNITS (ref 0–20)
MONOCYTES # BLD AUTO: 0.78 THOUSAND/ÂΜL (ref 0.17–1.22)
MONOCYTES NFR BLD AUTO: 9 % (ref 4–12)
NEUTROPHILS # BLD AUTO: 6.73 THOUSANDS/ÂΜL (ref 1.85–7.62)
NEUTS SEG NFR BLD AUTO: 80 % (ref 43–75)
NRBC BLD AUTO-RTO: 0 /100 WBCS
PLATELET # BLD AUTO: 205 THOUSANDS/UL (ref 149–390)
PMV BLD AUTO: 10.3 FL (ref 8.9–12.7)
POTASSIUM SERPL-SCNC: 4.8 MMOL/L (ref 3.5–5.3)
PROT SERPL-MCNC: 6.3 G/DL (ref 6.4–8.4)
RBC # BLD AUTO: 4.62 MILLION/UL (ref 3.81–5.12)
SODIUM SERPL-SCNC: 136 MMOL/L (ref 135–147)
TSH SERPL DL<=0.05 MIU/L-ACNC: 1.43 UIU/ML (ref 0.45–4.5)
WBC # BLD AUTO: 8.42 THOUSAND/UL (ref 4.31–10.16)

## 2023-11-15 PROCEDURE — 99232 SBSQ HOSP IP/OBS MODERATE 35: CPT | Performed by: STUDENT IN AN ORGANIZED HEALTH CARE EDUCATION/TRAINING PROGRAM

## 2023-11-15 PROCEDURE — 84443 ASSAY THYROID STIM HORMONE: CPT

## 2023-11-15 PROCEDURE — 85025 COMPLETE CBC W/AUTO DIFF WBC: CPT | Performed by: PHYSICIAN ASSISTANT

## 2023-11-15 PROCEDURE — 76705 ECHO EXAM OF ABDOMEN: CPT

## 2023-11-15 PROCEDURE — G1004 CDSM NDSC: HCPCS

## 2023-11-15 PROCEDURE — 87493 C DIFF AMPLIFIED PROBE: CPT

## 2023-11-15 PROCEDURE — C9113 INJ PANTOPRAZOLE SODIUM, VIA: HCPCS

## 2023-11-15 PROCEDURE — 87209 SMEAR COMPLEX STAIN: CPT

## 2023-11-15 PROCEDURE — 74177 CT ABD & PELVIS W/CONTRAST: CPT

## 2023-11-15 PROCEDURE — 87177 OVA AND PARASITES SMEARS: CPT

## 2023-11-15 PROCEDURE — 99232 SBSQ HOSP IP/OBS MODERATE 35: CPT | Performed by: INTERNAL MEDICINE

## 2023-11-15 PROCEDURE — 80053 COMPREHEN METABOLIC PANEL: CPT | Performed by: PHYSICIAN ASSISTANT

## 2023-11-15 PROCEDURE — 83993 ASSAY FOR CALPROTECTIN FECAL: CPT

## 2023-11-15 PROCEDURE — 87505 NFCT AGENT DETECTION GI: CPT

## 2023-11-15 RX ORDER — SUCRALFATE 1 G/1
1 TABLET ORAL 2 TIMES DAILY
Status: DISCONTINUED | OUTPATIENT
Start: 2023-11-15 | End: 2023-11-17 | Stop reason: HOSPADM

## 2023-11-15 RX ORDER — SUCRALFATE 1 G/1
1 TABLET ORAL
Status: CANCELLED | OUTPATIENT
Start: 2023-11-15

## 2023-11-15 RX ORDER — AMOXICILLIN 250 MG
2 CAPSULE ORAL
Status: CANCELLED | OUTPATIENT
Start: 2023-11-15

## 2023-11-15 RX ORDER — AMOXICILLIN 250 MG
1 CAPSULE ORAL 2 TIMES DAILY
Status: DISCONTINUED | OUTPATIENT
Start: 2023-11-15 | End: 2023-11-17 | Stop reason: HOSPADM

## 2023-11-15 RX ORDER — ACETAMINOPHEN 325 MG/1
975 TABLET ORAL EVERY 8 HOURS SCHEDULED
Status: DISCONTINUED | OUTPATIENT
Start: 2023-11-15 | End: 2023-11-17 | Stop reason: HOSPADM

## 2023-11-15 RX ADMIN — ACETAMINOPHEN 650 MG: 325 TABLET, FILM COATED ORAL at 06:07

## 2023-11-15 RX ADMIN — DICYCLOMINE HYDROCHLORIDE 20 MG: 20 TABLET ORAL at 22:12

## 2023-11-15 RX ADMIN — Medication 400 MG: at 08:58

## 2023-11-15 RX ADMIN — DULOXETINE HYDROCHLORIDE 60 MG: 60 CAPSULE, DELAYED RELEASE ORAL at 08:58

## 2023-11-15 RX ADMIN — DICYCLOMINE HYDROCHLORIDE 20 MG: 20 TABLET ORAL at 06:07

## 2023-11-15 RX ADMIN — ACETAMINOPHEN 975 MG: 325 TABLET, FILM COATED ORAL at 13:09

## 2023-11-15 RX ADMIN — ACETAMINOPHEN 650 MG: 325 TABLET, FILM COATED ORAL at 00:30

## 2023-11-15 RX ADMIN — MIDODRINE HYDROCHLORIDE 10 MG: 5 TABLET ORAL at 08:58

## 2023-11-15 RX ADMIN — DICYCLOMINE HYDROCHLORIDE 20 MG: 20 TABLET ORAL at 15:41

## 2023-11-15 RX ADMIN — GABAPENTIN 300 MG: 300 CAPSULE ORAL at 08:58

## 2023-11-15 RX ADMIN — LEVOTHYROXINE SODIUM 50 MCG: 50 TABLET ORAL at 06:07

## 2023-11-15 RX ADMIN — SUCRALFATE 1 G: 1 TABLET ORAL at 15:59

## 2023-11-15 RX ADMIN — IOHEXOL 100 ML: 350 INJECTION, SOLUTION INTRAVENOUS at 17:03

## 2023-11-15 RX ADMIN — GABAPENTIN 300 MG: 300 CAPSULE ORAL at 15:41

## 2023-11-15 RX ADMIN — QUETIAPINE FUMARATE 100 MG: 100 TABLET ORAL at 22:12

## 2023-11-15 RX ADMIN — DICYCLOMINE HYDROCHLORIDE 20 MG: 20 TABLET ORAL at 10:53

## 2023-11-15 RX ADMIN — ACETAMINOPHEN 975 MG: 325 TABLET, FILM COATED ORAL at 22:12

## 2023-11-15 RX ADMIN — ENOXAPARIN SODIUM 40 MG: 40 INJECTION SUBCUTANEOUS at 08:58

## 2023-11-15 RX ADMIN — PANTOPRAZOLE SODIUM 40 MG: 40 INJECTION, POWDER, FOR SOLUTION INTRAVENOUS at 08:58

## 2023-11-15 RX ADMIN — GABAPENTIN 300 MG: 300 CAPSULE ORAL at 22:12

## 2023-11-15 RX ADMIN — ASPIRIN 81 MG: 81 TABLET, COATED ORAL at 08:58

## 2023-11-15 NOTE — ASSESSMENT & PLAN NOTE
Pt presented with mildly elevated LFTs AST 42, ALT 73  This morning AST 46, ALT 73  Follows with GI outpatient who are pursuing workup - DEV elevated but anti-smooth muscle and antimitochondrial antibodies normal. No alcohol in more than 20 years. 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 Quantitative immunoglobulins wnl.    Iron panel wnl   Hepatitis panel negative    Plan:  Continue to trend LFTs  Celiac panel, AMA, anti-smooth muscle antibody, alpha-1 antitrypsin, ceruloplasmin pending

## 2023-11-15 NOTE — ASSESSMENT & PLAN NOTE
Presented with few day history of dyspnea on exertion, nonproductive cough, and inability to lie flat  Pt sleeps with 2 pillows at home   BNP normal 14  Saturating well on RA   CTA PE Study- Negative for PE, mosaic pattern in the lung parenchyma concerning for small vessel/ small airway disease  Per PCP-patient has been prescribed nebulized treatments and inhalers for this intermittent episodes of SOB  Echo - LVEF 60%, normal function  Currently saturating well on RA     Plan:   Monitor oxygenation on room air   Albuterol inhaler prn   Imaging reviewed with pulmonology, recommend outpatient referral on discharge

## 2023-11-15 NOTE — ASSESSMENT & PLAN NOTE
Presented with generalized burning abdominal pain, nausea, and loose stools. Diagnosed with Crohn's in May 2023 - treated with Infliximab infusions every 8 weeks. Last infusion 9/29, incomplete dose due to loss of IV access   Reports many loose stools with mucus on Saturday, decreased stools on Sunday - has not had a BM in hospital   She reports common fecal urgency at home   She has decreased appetite and oral hydration  She denies sick contacts or recent travel  CTA PE w/ AP- No intraabdominal process seen, large stool burden   CRP elevated  GI consult - suspect PUD or gastritis more likely than Crohn's flare, recommended avoid NSAIDs and narcotics. Plan:  Stool studies pending   Fecal Calprotectin  Stool Enteric Panel  Ova and Parasite   C Diff   GI on board, appreciate recommendations   Per GI, no EGD today.  RUQ US pending   Pain control: Tylenol, Bentyl  Bowel regimen

## 2023-11-15 NOTE — ASSESSMENT & PLAN NOTE
Home regimen includes Midodrine 10mg BID   BP this morning 129/83    Plan:  Continue midodrine 10mg daily rather than BID due to some elevated BPs

## 2023-11-15 NOTE — PROGRESS NOTES
[facility] - Inpatient Progress Note  Cristina Jimenez 80 y.o. female MRN: [id number]  Encounter: [id number]          ASSESSMENT AND PLAN:    Principal Problem:    Generalized abdominal pain  Active Problems:    Orthostatic hypotension    Adult hypothyroidism    SOB (shortness of breath) on exertion    Elevated LFTs    Abdominal pain  Patient presented with abdominal pain after a 6 day course of Prednisone for muscle spasms of the hands prescribed by her PCP. Also reports NSAID use over the preceding weekend. Abdominal pain is possibly in the setting of PUD given steroid use and initial burning quality. Lower suspicion for Crohn's flare vs partial SBO. Patient also noted to have large stool burden on presenting imaging which could be contributing to her current symptoms.      - RUQ ultrasound ordered to evaluate for potential biliary causes of patient's presenting symptoms.  - At this time we will hold off on EGD  - Follow up on Fecal calprotectin  - Follow up on stool enteric panel, C diff PCR with EIA, Stool Ova and parasite  - Patient started on bowel regimen - no BM yet. Increased Senokot S to 2 tablets daily at bedtime. Continue Miralax BID. Patient did not get any today due to being NPO for EGD. Elevated LFT  Patient presents with elevated LFTs - AST 42, ALT 73. Values noted to be as high as AST - 101 and ALT - 190 on 10/3. Patient is being worked up by Dr. Denys Diaz for possible causes. Has prior DEV antibody elevation history, has had normal AMA and anti-smooth muscle antibodies in the past. Acute hepatitis panel negative. Iron panel WNL, transferring saturation calculated to be 34%. DEV wnl.  Likely JIMÉNEZ vs drug induced liver injury, lower suspicion for autoimmune hepatitis given normal IgG levels, however AMA and anti-smooth muscle panel still pending.      - alpha - 1 antitrypsin pending  - Follow up on ceruloplasmin  - AMA and anti-smooth muscle antibody pending  - Anti-histone antibody pending  - Celiac panel pending     Crohn's disease on Infliximab  At this time low suspicion for Crohn's flare given 1 day of 4-5 loose bowel movement without overt diarrhea, patient was also taking a Prednisone taper prescribed by PCP the week prior to symptoms onset. CRP today noted to be 15.8.     - Follow up on fecal calprotectin. - At this time will continue to hold off on steroid initiation      ______________________________________________________________________    SUBJECTIVE:  Ms. Jason Thakkar was seen and examined at the bedside, not in acute distress at the time of my evaluation. She reports an episode of nausea overnight that resolved after administration of Zofran. She reports improvement in epigastric pain, continue to endorse suprapubic/RLQ pain, however states that it is now 4/10. Last BM on Sunday, 11/12. Notes that she has not yet passed flatus. Denied fevers, chills, worsening abdominal distension, changes in urinary habits.        Historical Information   Past Medical History:   Diagnosis Date    Abdominal adhesions     Anesthesia complication     difficult to wake up    Anxiety     Arthritis     Cancer ([address])     melanoma    Colon polyp     Depression     Depression     Disease of thyroid gland     Fibromyalgia     Fibromyalgia, primary     Fractures 2006    GERD (gastroesophageal reflux disease)     History of cholecystectomy 09/07/2019    Hyperlipidemia     Irregular heart beat     can be tachy; also has orthoststic hypotension    Lazy eye     resolved: 3/27/17    Medical clearance for psychiatric admission 07/13/2021    Melanoma ([address]) 2010    Osteoarthritis 07/2018    Osteopenia     with joint pain-elevated DEV    Psychiatric disorder     Shortness of breath     assoc with tachycardia    Stomach disorder 1995    Tinnitus     Wears glasses     for reading     Past Surgical History:   Procedure Laterality Date    ABDOMINAL SURGERY      lysis of adhesions x 2    APPENDECTOMY CERVICAL FUSION      May 5,2021 and Jan. 01,2020    CHOLECYSTECTOMY      open    COLONOSCOPY      DILATION AND CURETTAGE OF UTERUS      FOOT SURGERY  05/2017    NECK SURGERY  01/2019 5/2021    NEUROMA EXCISION Right 05/26/2017    Procedure: EXCISION MASS / FIBROMA FOOT;  Surgeon: Eh Arcos DPM;  Location: [facility];  Service:     PARS PLANA VITRECTOMY W/ REPAIR OF MACULAR HOLE  12/23/2020    VT XCAPSL CTRC RMVL INSJ IO LENS PROSTH W/O ECP Left 12/06/2021    Procedure: EXTRACTION EXTRACAPSULAR CATARACT PHACO INTRAOCULAR LENS (IOL);   Surgeon: Ilda Gutierrez MD;  Location: [facility] MAIN OR;  Service: Ophthalmology    RIGHT OOPHORECTOMY      WISDOM TOOTH EXTRACTION      x4     Social History   Social History     Substance and Sexual Activity   Alcohol Use Not Currently     Social History     Substance and Sexual Activity   Drug Use No     Social History     Tobacco Use   Smoking Status Never   Smokeless Tobacco Never     Family History   Problem Relation Age of Onset    Heart disease Mother     Stroke Mother 58    COPD Mother     Arthritis Mother     Hypertension Father     Dislocations Sister     Multiple sclerosis Sister     Neurological problems Sister     Scoliosis Sister     No Known Problems Maternal Grandmother     No Known Problems Maternal Grandfather     No Known Problems Paternal Grandmother     No Known Problems Paternal Grandfather     Kidney disease Brother         kidney transplant    No Known Problems Maternal Aunt     No Known Problems Maternal Uncle     No Known Problems Paternal Aunt     No Known Problems Paternal Uncle     ADD / ADHD Neg Hx     Anesthesia problems Neg Hx     Cancer Neg Hx     Clotting disorder Neg Hx     Collagen disease Neg Hx     Diabetes Neg Hx     Dislocations Neg Hx     Learning disabilities Neg Hx     Neurological problems Neg Hx     Osteoporosis Neg Hx     Rheumatologic disease Neg Hx     Scoliosis Neg Hx     Vascular Disease Neg Hx        Meds/Allergies       Current Facility-Administered Medications:     acetaminophen (TYLENOL) tablet 650 mg, 650 mg, Oral, Q6H [facility], 650 mg at 11/15/23 0607    albuterol (PROVENTIL HFA,VENTOLIN HFA) inhaler 2 puff, 2 puff, Inhalation, Q4H PRN    aspirin (ECOTRIN LOW STRENGTH) EC tablet 81 mg, 81 mg, Oral, Daily, 81 mg at 11/15/23 0858    dicyclomine (BENTYL) tablet 20 mg, 20 mg, Oral, 4x Daily (AC & HS), 20 mg at 11/15/23 0607    DULoxetine (CYMBALTA) delayed release capsule 60 mg, 60 mg, Oral, Daily, 60 mg at 11/15/23 0858    enoxaparin (LOVENOX) subcutaneous injection 40 mg, 40 mg, Subcutaneous, Daily, 40 mg at 11/15/23 0858    gabapentin (NEURONTIN) capsule 300 mg, 300 mg, Oral, TID, 300 mg at 11/15/23 0858    ketorolac (TORADOL) injection 15 mg, 15 mg, Intravenous, Q6H PRN, 15 mg at 11/14/23 2051    levothyroxine tablet 50 mcg, 50 mcg, Oral, Daily, 50 mcg at 11/15/23 0607    magnesium Oxide (MAG-OX) tablet 400 mg, 400 mg, Oral, Daily, 400 mg at 11/15/23 0858    midodrine (PROAMATINE) tablet 10 mg, 10 mg, Oral, Daily, 10 mg at 11/15/23 0858    ondansetron (ZOFRAN) oral solution 4 mg, 4 mg, Oral, Q6H PRN, 4 mg at 11/14/23 1853    pantoprazole (PROTONIX) injection 40 mg, 40 mg, Intravenous, Q24H [facility], 40 mg at 11/15/23 0858    polyethylene glycol (MIRALAX) packet 17 g, 17 g, Oral, BID, 17 g at 11/14/23 1721    QUEtiapine (SEROquel) tablet 100 mg, 100 mg, Oral, HS, 100 mg at 11/14/23 2145    senna-docusate sodium (SENOKOT S) 8.6-50 mg per tablet 1 tablet, 1 tablet, Oral, HS, 1 tablet at 11/14/23 2144    sodium chloride 0.9 % infusion, 100 mL/hr, Intravenous, Continuous, 100 mL/hr at 11/13/23 2001    Allergies   Allergen Reactions    Meperidine Lightheadedness and Other (See Comments)    Sulfa Antibiotics Hives       Objective     Blood pressure 129/83, pulse 86, temperature 98.1 °F (36.7 °C), resp. rate 17, height 5' 5" (1.651 m), weight 77.6 kg (171 lb), SpO2 94 %. Body mass index is 28.46 kg/m².       PHYSICAL EXAM:      General Appearance: Alert, cooperative, no distress   HEENT:   Normocephalic, atraumatic, anicteric    Neck:  Supple, symmetrical, trachea midline   Lungs:    respirations even and unlabored, bilateral rhonchi auscultated in all lung fields. Heart:   Regular rate and rhythm; +S1/+S2   Abdomen:   Soft, non-distended; normal bowel sounds;  Tenderness to palpation in the epigastric region, mild tenderness to palpation in the suprapubic/RLQ.  no masses, no organomegaly    Genitalia:   Deferred    Rectal:   Deferred    Extremities:  No cyanosis, clubbing or edema    Pulses:  2+ and symmetric    Skin:  No jaundice, rashes, or lesions visualized          Lab Results:   Admission on 11/13/2023   Component Date Value    Ventricular Rate 11/13/2023 119     Atrial Rate 11/13/2023 119     NM Interval 11/13/2023 148     QRSD Interval 11/13/2023 72     QT Interval 11/13/2023 312     QTC Interval 11/13/2023 438     P Axis 11/13/2023 55     QRS [place] 11/13/2023 -30     T Wave [place] 11/13/2023 48     WBC 11/13/2023 10.37 (H)     RBC 11/13/2023 5.10     Hemoglobin 11/13/2023 14.4     Hematocrit 11/13/2023 45.4     MCV 11/13/2023 89     MCH 11/13/2023 28.2     MCHC 11/13/2023 31.7     RDW 11/13/2023 14.0     MPV 11/13/2023 9.7     Platelets 14/95/9400 281     Sodium 11/13/2023 138     Potassium 11/13/2023 4.1     Chloride 11/13/2023 101     CO2 11/13/2023 29     ANION GAP 11/13/2023 8     BUN 11/13/2023 18     Creatinine 11/13/2023 0.90     Glucose 11/13/2023 92     Calcium 11/13/2023 9.7     AST 11/13/2023 42 (H)     ALT 11/13/2023 73 (H)     Alkaline Phosphatase 11/13/2023 92     Total Protein 11/13/2023 7.3     Albumin 11/13/2023 4.1     Total Bilirubin 11/13/2023 0.57     eGFR 11/13/2023 66     Lipase 11/13/2023 17     Color, UA 11/13/2023 Yellow     Clarity, UA 11/13/2023 Clear     Specific Gravity, UA 11/13/2023 1.026     pH, UA 11/13/2023 5.0     Leukocytes, UA 11/13/2023 Small (A)     Nitrite, UA 11/13/2023 Negative     Protein, UA 11/13/2023 Trace (A)     Glucose, UA 11/13/2023 Negative     Ketones, UA 11/13/2023 Trace (A)     Urobilinogen, UA 11/13/2023 2.0 (A)     Bilirubin, UA 11/13/2023 Negative     Occult Blood, UA 11/13/2023 Negative     Protime 11/13/2023 13.0     INR 11/13/2023 0.93     PTT 11/13/2023 27     Procalcitonin 11/13/2023 <0.05     LACTIC ACID 11/13/2023 1.3     Blood Culture 11/13/2023 No Growth at 24 hrs. Blood Culture 11/13/2023 No Growth at 24 hrs.      RBC, UA 11/13/2023 1-2     WBC, UA 11/13/2023 2-4 (A)     Epithelial Cells 11/13/2023 Occasional     Bacteria, UA 11/13/2023 Occasional     MUCUS THREADS 11/13/2023 Innumerable (A)     Hyaline Casts, UA 11/13/2023 0-3 (A)     Segmented % 11/13/2023 65     Bands % 11/13/2023 1     Lymphocytes % 11/13/2023 24     Monocytes % 11/13/2023 8     Eosinophils, % 11/13/2023 1     Basophils % 11/13/2023 1     Absolute Neutrophils 11/13/2023 6.84     Lymphocytes Absolute 11/13/2023 2.49     Monocytes Absolute 11/13/2023 0.83     Eosinophils Absolute 11/13/2023 0.10     Basophils Absolute 11/13/2023 0.10     RBC Morphology 11/13/2023 Normal     Platelet Estimate 27/64/8448 Adequate     Ventricular Rate 11/13/2023 89     Atrial Rate 11/13/2023 89     GA Interval 11/13/2023 160     QRSD Interval 11/13/2023 78     QT Interval 11/13/2023 368     QTC Interval 11/13/2023 447     P Axis 11/13/2023 59     QRS [place] 11/13/2023 -19     T Wave [place] 11/13/2023 36     SARS-CoV-2 11/13/2023 Negative     INFLUENZA A PCR 11/13/2023 Negative     INFLUENZA B PCR 11/13/2023 Negative     RSV PCR 11/13/2023 Negative     TSH 3RD GENERATON 11/13/2023 3.760     A4C EF 11/14/2023 64     LVIDd 11/14/2023 3.40     LVIDS 11/14/2023 2.70     IVSd 11/14/2023 1.30     LVPWd 11/14/2023 1.10     FS 11/14/2023 21     MV E' Tissue Velocity Se* 11/14/2023 9     LA Volume Index (BP) 11/14/2023 14.0     E/A ratio 11/14/2023 0.68     E wave deceleration time 11/14/2023 293     MV Peak E [place] 11/14/2023 53     MV Peak A [place] 11/14/2023 0.78     RVID d 11/14/2023 2.6     Tricuspid annular plane * 11/14/2023 1.60     LA size 11/14/2023 3.1     LA length (A2C) 11/14/2023 4.10     LA volume (BP) 11/14/2023 26     RA 2D Volume 11/14/2023 18.0     RAA A4C 11/14/2023 9.8     MV stenosis pressure 1/2* 11/14/2023 85     MV valve area p 1/2 meth* 11/14/2023 2.59     Ao root 11/14/2023 3.30     Asc Ao 11/14/2023 3.3     Left ventricular stroke * 11/14/2023 23.00     IVS 11/14/2023 1.3     LEFT VENTRICLE SYSTOLIC * 01/87/2578 26     LV DIASTOLIC VOLUME (MOD* 66/04/1876 49     Left Atrium Area-systoli* 11/14/2023 10.8     Left Atrium Area-systoli* 11/14/2023 11.6     LVSV, 2D 11/14/2023 23     LV EF 11/14/2023 60     Est. RA pres 11/14/2023 3.0     BNP 11/13/2023 14     Magnesium 11/13/2023 2.1     Magnesium 11/14/2023 2.1     Sodium 11/14/2023 136     Potassium 11/14/2023 4.3     Chloride 11/14/2023 105     CO2 11/14/2023 27     ANION GAP 11/14/2023 4     BUN 11/14/2023 16     Creatinine 11/14/2023 0.81     Glucose 11/14/2023 91     Calcium 11/14/2023 9.0     AST 11/14/2023 46 (H)     ALT 11/14/2023 73 (H)     Alkaline Phosphatase 11/14/2023 85     Total Protein 11/14/2023 6.6     Albumin 11/14/2023 3.7     Total Bilirubin 11/14/2023 0.74     eGFR 11/14/2023 75     CRP 11/14/2023 15.8 (H)     IGA 11/14/2023 271     IGG 11/14/2023 858     IGM 11/14/2023 130     DEV 11/14/2023 Negative     Iron Saturation 11/14/2023 34     TIBC 11/14/2023 340     Iron 11/14/2023 117     UIBC 11/14/2023 223     Ferritin 11/14/2023 83     Sodium 11/15/2023 136     Potassium 11/15/2023 4.8     Chloride 11/15/2023 104     CO2 11/15/2023 28     ANION GAP 11/15/2023 4     BUN 11/15/2023 26 (H)     Creatinine 11/15/2023 0.70     Glucose 11/15/2023 112     Glucose, Fasting 11/15/2023 112 (H)     Calcium 11/15/2023 8.8     AST 11/15/2023 55 (H)     ALT 11/15/2023 80 (H)     Alkaline Phosphatase 11/15/2023 85     Total Protein 11/15/2023 6.3 (L)     Albumin 11/15/2023 3.5 Total Bilirubin 11/15/2023 0.78     eGFR 11/15/2023 89     WBC 11/15/2023 8.42     RBC 11/15/2023 4.62     Hemoglobin 11/15/2023 13.2     Hematocrit 11/15/2023 42.3     MCV 11/15/2023 92     MCH 11/15/2023 28.6     MCHC 11/15/2023 31.2 (L)     RDW 11/15/2023 13.8     MPV 11/15/2023 10.3     Platelets 92/20/9783 205     nRBC 11/15/2023 0     Neutrophils Relative 11/15/2023 80 (H)     Immat GRANS % 11/15/2023 1     Lymphocytes Relative 11/15/2023 9 (L)     Monocytes Relative 11/15/2023 9     Eosinophils Relative 11/15/2023 1     Basophils Relative 11/15/2023 0     Neutrophils Absolute 11/15/2023 6.73     Immature Grans Absolute 11/15/2023 0.05     Lymphocytes Absolute 11/15/2023 0.78     Monocytes Absolute 11/15/2023 0.78     Eosinophils Absolute 11/15/2023 0.06     Basophils Absolute 11/15/2023 0.02          Radiology Results:   Echo complete w/ contrast if indicated    Result Date: 11/14/2023  Narrative:   Left Ventricle: Left ventricular cavity size is normal. Wall thickness is normal. The left ventricular ejection fraction is 60%. Systolic function is normal. Wall motion is normal. Diastolic function is normal.  Left atrial filling pressure is normal.   Left Atrium: The atrium is normal in size. IVC/SVC: The right atrial pressure is estimated at 3.0 mmHg. The inferior vena cava is normal in size. Respirophasic changes were normal.     PE Study with CT abdomen & pelvis with contrast    Result Date: 11/13/2023  Narrative: CT PULMONARY ANGIOGRAM OF THE CHEST AND CT ABDOMEN AND PELVIS WITH INTRAVENOUS CONTRAST INDICATION:   sob w/ exertion, tachycardia, bilateral rhonchi r.o PE, PNA. has crohns, ab pain x 3 days. r/o ab pathologies. COMPARISON: CT abdomen/pelvis 10/3/2023. CT chest 9/15/2023. MRI abdomen 7/25/2023. TECHNIQUE:  CT examination of the chest, abdomen and pelvis was performed.   Thin section CT angiographic technique was used in the chest in order to evaluate for pulmonary embolus and coronal 3D MIP postprocessing was performed on the acquisition scanner. Axial, sagittal, and coronal 2D reformatted images were created from the source data and submitted for interpretation. Radiation dose length product (DLP) for this visit:  1077 mGy-cm . This examination, like all CT scans performed in the [facility], was performed utilizing techniques to minimize radiation dose exposure, including the use of iterative reconstruction and automated exposure control. IV Contrast:  100 mL of iohexol (OMNIPAQUE) Enteric Contrast: None FINDINGS: PULMONARY ARTERIAL TREE:  No filling defects to suggest acute or chronic pulmonary embolism in the entire pulmonary arterial system. Normal caliber main pulmonary artery. LARGE AIRWAYS: Large airways are clear with no tracheal or endobronchial lesion. LUNGS: Somewhat limited evaluation of the lung parenchyma due to respiratory motion artifact. There is suggestion of mild mosaic attenuation compatible with small vessel/small airways disease. No suspicious lesions. No consolidation. No edema. PLEURA: No pleural effusion or pneumothorax. HEART: Normal cardiac size and morphology. No Coronary artery calcification. No pericardial effusion or thickening. VESSELS: Normal caliber thoracic aorta with no detectable atherosclerotic plaque. MEDIASTINUM AND LESLIE: Top-normal mediastinal lymph nodes measure up to 10 mm in short axis. No lymphadenopathy. Small hiatal hernia. CHEST WALL AND LOWER NECK:   Left chest wall implantable cardiac defibrillator. ABDOMEN: LIVER: Normal size and morphology. No suspicious lesion. BILIARY: No intrahepatic biliary ductal dilatation. Normal caliber common bile duct. GALLBLADDER: Gallbladder is surgically absent. SPLEEN: Within normal limits. No suspicious lesion. Normal spleen size. PANCREAS: Mildly atrophic. Known small pancreatic cysts better evaluated on prior MRI; see associated recommendations. No pancreatic/peripancreatic inflammation.  ADRENAL GLANDS: Within normal limits. KIDNEYS/URETERS: Normal size and position. Symmetric enhancement. No suspicious lesion. No calcified stones or hydronephrosis. Ureters within normal limits. STOMACH AND BOWEL: Stomach is grossly within normal limits. Normal caliber small bowel. Normal caliber large bowel. No evidence of active small or large bowel inflammatory process. APPENDIX: No findings to suggest acute appendicitis. ABDOMINOPELVIC CAVITY: No ascites. No intraperitoneal free air. No lymphadenopathy. No retroperitoneal hematoma. VESSELS: Normal caliber abdominal aorta with no detectable atherosclerotic plaque. The celiac, SMA, and LIAM are patent. The main, right, and left portal veins are patent. The SMV and splenic vein are patent. The hepatic veins are patent. PELVIS REPRODUCTIVE ORGANS: Anteverted uterus. There is no evidence of adnexal mass. URINARY BLADDER: Within normal limits. No calculi. ABDOMINAL WALL/INGUINAL REGIONS: Within normal limits. BONES: Mild multilevel degenerative changes in the spine. Vertebral body height is maintained. No acute fracture or destructive osseous lesion. Degenerative changes at the hips. Impression: 1. No filling defect to suggest acute or chronic pulmonary embolism in the entire pulmonary arterial system. 2.  Suggestion of mosaic lung parenchymal attenuation compatible with mild small vessel/small airways disease. 3.  No acute findings in the abdomen or pelvis. Workstation performed: [id number]     XR chest pa & lateral    Result Date: 11/8/2023  Narrative: CHEST INDICATION:   R05.1: Acute cough. COMPARISON: Compared with 1/26/2023 EXAM PERFORMED/VIEWS:  XR CHEST PA & LATERAL FINDINGS: Left lower chest loop recorder. Cardiomediastinal silhouette appears unremarkable. The lungs are clear. No pneumothorax or pleural effusion. Osseous structures appear within normal limits for patient age. Impression: No acute cardiopulmonary disease.  Workstation performed: [id number] The patient was seen and examined by Dr. Milka Mathis, all key medical decisions were made with Dr. Milka Mathis. Thank you for allowing us to participate in the care of this pleasant patient. We will follow up with you closely.

## 2023-11-15 NOTE — PHYSICAL THERAPY NOTE
PHYSICAL THERAPY SCREEN NOTE          Patient Name: Cristina KUMARIROAngelaX Date: 11/15/2023             11/15/23 0945   Note Type   Note type Screen   Additional Comments PT orders received, chart review performed. Entered pt's room to educate pt on role of PT in acute care to assess mobility and assist w/ DC recommendation. Pt reports she has been up and walking independently w/in the room (to/from bathroom and sitting on couch by the window) and she has no concerns regarding functional mobility at this time. Will screen and DC pt from Inpatient PT caseload due to pt w/o acute PT needs. Please re-consult PT if needs arise.          Massiel Linares, PT, DPT  11/15/23

## 2023-11-15 NOTE — PROGRESS NOTES
[facility]  Progress Note  Name: Jered Lezama  MRN: [id number]  Unit/Bed#: W -15 I Date of Admission: 11/13/2023   Date of Service: 11/15/2023 I Hospital Day: 0    Assessment/Plan   * Generalized abdominal pain  Assessment & Plan  Presented with generalized burning abdominal pain, nausea, and loose stools. Diagnosed with Crohn's in May 2023 - treated with Infliximab infusions every 8 weeks. Last infusion 9/29, incomplete dose due to loss of IV access   Reports many loose stools with mucus on Saturday, decreased stools on Sunday - has not had a BM in hospital   She reports common fecal urgency at home   She has decreased appetite and oral hydration  She denies sick contacts or recent travel  CTA PE w/ AP- No intraabdominal process seen, large stool burden   CRP elevated  GI consult - suspect PUD or gastritis more likely than Crohn's flare, recommended avoid NSAIDs and narcotics. Plan:  Stool studies pending   Fecal Calprotectin  Stool Enteric Panel  Ova and Parasite   C Diff   GI on board, appreciate recommendations   Per GI, no EGD today.  RUQ US pending   Pain control: Tylenol, Bentyl  Bowel regimen     SOB (shortness of breath) on exertion  Assessment & Plan  Presented with few day history of dyspnea on exertion, nonproductive cough, and inability to lie flat  Pt sleeps with 2 pillows at home   BNP normal 14  Saturating well on RA   CTA PE Study- Negative for PE, mosaic pattern in the lung parenchyma concerning for small vessel/ small airway disease  Per PCP-patient has been prescribed nebulized treatments and inhalers for this intermittent episodes of SOB  Echo - LVEF 60%, normal function  Currently saturating well on RA     Plan:   Monitor oxygenation on room air   Albuterol inhaler prn   Imaging reviewed with pulmonology, recommend outpatient referral on discharge     Elevated LFTs  Assessment & Plan  Pt presented with mildly elevated LFTs AST 42, ALT 73  This morning AST 46, ALT 73  Follows with GI outpatient who are pursuing workup - DEV elevated but anti-smooth muscle and antimitochondrial antibodies normal. No alcohol in more than 20 years. 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 Quantitative immunoglobulins wnl. Iron panel wnl   Hepatitis panel negative    Plan:  Continue to trend LFTs  Celiac panel, AMA, anti-smooth muscle antibody, alpha-1 antitrypsin, ceruloplasmin pending     Adult hypothyroidism  Assessment & Plan  Home regimen Levothyroxine 50mcg QD   Recent symptoms of multiple loose stools over the weekend  TSH normal    Plan:  Continue home regimen    Orthostatic hypotension  Assessment & Plan  Home regimen includes Midodrine 10mg BID   BP this morning 129/83    Plan:  Continue midodrine 10mg daily rather than BID due to some elevated BPs           VTE Pharmacologic Prophylaxis: VTE Score: 3 Moderate Risk (Score 3-4) - Pharmacological DVT Prophylaxis Ordered: enoxaparin (Lovenox). Mobility:   Basic Mobility Inpatient Raw Score: 24  JH-HLM Goal: 8: Walk 250 feet or more  JH-HLM Achieved: 7: Walk 25 feet or more  HLM Goal achieved. Continue to encourage appropriate mobility. Patient Centered Rounds: I performed bedside rounds with nursing staff today. Discussions with Specialists or Other Care Team Provider: GI    Education and Discussions with Family / Patient: Updated  () via phone. Current Length of Stay: 0 day(s)  Current Patient Status: Inpatient   Certification Statement: The patient will continue to require additional inpatient hospital stay due to abdominal pain  Discharge Plan: Anticipate discharge in 24-48 hrs to home. Code Status: Level 1 - Full Code    Subjective:   Pt was examined at bedside. She reports that her abdominal pain has improved, but is still 5/10 in RLQ. She describes it as burning and aching. Her SOB has improved and her cough has resolved.  She has not had a BM since . She has no urinary symptoms. She ambulates independently. Objective:     Vitals:   Temp (24hrs), Av.9 °F (36.6 °C), Min:97.7 °F (36.5 °C), Max:98.1 °F (36.7 °C)    Temp:  [97.7 °F (36.5 °C)-98.1 °F (36.7 °C)] 98.1 °F (36.7 °C)  HR:  [86-89] 86  Resp:  [17-20] 17  BP: (118-131)/(77-83) 129/83  SpO2:  [88 %-94 %] 94 %  Body mass index is 28.46 kg/m². Input and Output Summary (last 24 hours): Intake/Output Summary (Last 24 hours) at 11/15/2023 1451  Last data filed at 11/15/2023 1400  Gross per 24 hour   Intake 3399 ml   Output --   Net 3399 ml       Physical Exam:   Physical Exam  Vitals reviewed. Constitutional:       General: She is not in acute distress. Appearance: Normal appearance. HENT:      Head: Normocephalic and atraumatic. Nose: Nose normal.      Mouth/Throat:      Mouth: Mucous membranes are dry. Pharynx: Oropharynx is clear. Cardiovascular:      Rate and Rhythm: Normal rate and regular rhythm. Pulses: Normal pulses. Heart sounds: Normal heart sounds. Pulmonary:      Effort: Pulmonary effort is normal.      Breath sounds: Rhonchi (bilateral, end expiratory, decreased from yesterday) present. Abdominal:      General: Bowel sounds are normal. There is no distension. Palpations: Abdomen is soft. Tenderness: There is abdominal tenderness (RLQ). There is rebound. There is no guarding. Comments: Some fullness in RLQ   Musculoskeletal:         General: Tenderness (over left shin) present. Normal range of motion. Cervical back: Normal range of motion. Skin:     General: Skin is warm and dry. Neurological:      Mental Status: She is alert and oriented to person, place, and time. Psychiatric:         Mood and Affect: Mood normal.         Behavior: Behavior normal.         Thought Content:  Thought content normal.         Judgment: Judgment normal.         Additional Data:     Labs:  Results from last 7 days   Lab Units 11/15/23  0619 11/13/23  1123   WBC Thousand/uL 8.42 10.37*   HEMOGLOBIN g/dL 13.2 14.4   HEMATOCRIT % 42.3 45.4   PLATELETS Thousands/uL 205 281   BANDS PCT %  --  1   NEUTROS PCT % 80*  --    LYMPHS PCT % 9*  --    LYMPHO PCT %  --  24   MONOS PCT % 9  --    MONO PCT %  --  8   EOS PCT % 1 1     Results from last 7 days   Lab Units 11/15/23  0619   SODIUM mmol/L 136   POTASSIUM mmol/L 4.8   CHLORIDE mmol/L 104   CO2 mmol/L 28   BUN mg/dL 26*   CREATININE mg/dL 0.70   ANION GAP mmol/L 4   CALCIUM mg/dL 8.8   ALBUMIN g/dL 3.5   TOTAL BILIRUBIN mg/dL 0.78   ALK PHOS U/L 85   ALT U/L 80*   AST U/L 55*   GLUCOSE RANDOM mg/dL 112     Results from last 7 days   Lab Units 11/13/23  1138   INR  0.93             Results from last 7 days   Lab Units 11/13/23  1138 11/13/23  1123   LACTIC ACID mmol/L 1.3  --    PROCALCITONIN ng/ml  --  <0.05       Lines/Drains:  Invasive Devices       Peripheral Intravenous Line  Duration             Peripheral IV 11/13/23 Left Forearm 1 day                          Imaging: Reviewed radiology reports from this admission including: chest CT scan and abdominal/pelvic CT    Recent Cultures (last 7 days):   Results from last 7 days   Lab Units 11/13/23  1123   BLOOD CULTURE  No Growth at 24 hrs. No Growth at 24 hrs.        Last 24 Hours Medication List:   Current Facility-Administered Medications   Medication Dose Route Frequency Provider Last Rate    acetaminophen  975 mg Oral [facility] Suzi Moncada MD      albuterol  2 puff Inhalation Q4H PRN Jair Cooper MD      aspirin  81 mg Oral Daily Scott Wei MD      dicyclomine  20 mg Oral 4x Daily (AC & HS) Scott Wei MD      DULoxetine  60 mg Oral Daily Scott Wei MD      enoxaparin  40 mg Subcutaneous Daily Scott Wei MD      gabapentin  300 mg Oral TID Scott Wei MD      levothyroxine  50 mcg Oral Daily Scott Wei MD      magnesium Oxide  400 mg Oral Daily Scott Wei MD      midodrine  10 mg Oral Daily Brook Primer MD Rodrigo      ondansetron  4 mg Oral Q6H PRN Earline Izaguirre MD      pantoprazole  40 mg Intravenous Q24H [facility] Bonita Todd MD      polyethylene glycol  17 g Oral BID Trae Graham MD      QUEtiapine  100 mg Oral HS Bonita Todd MD      senna-docusate sodium  1 tablet Oral BID Trae Graham MD      sodium chloride  100 mL/hr Intravenous Continuous Bonita Todd  mL/hr (11/13/23 2001)        Today, Patient Was Seen By: Dahiana Luis DO    **Please Note: This note may have been constructed using a voice recognition system. **

## 2023-11-16 DIAGNOSIS — K50.80 CROHN'S DISEASE OF BOTH SMALL AND LARGE INTESTINE WITHOUT COMPLICATION (HCC): Primary | ICD-10-CM

## 2023-11-16 LAB
ALBUMIN SERPL BCP-MCNC: 3.8 G/DL (ref 3.5–5)
ALP SERPL-CCNC: 81 U/L (ref 34–104)
ALT SERPL W P-5'-P-CCNC: 69 U/L (ref 7–52)
ANION GAP SERPL CALCULATED.3IONS-SCNC: 3 MMOL/L
AST SERPL W P-5'-P-CCNC: 59 U/L (ref 13–39)
BASOPHILS # BLD AUTO: 0.02 THOUSANDS/ÂΜL (ref 0–0.1)
BASOPHILS NFR BLD AUTO: 1 % (ref 0–1)
BILIRUB SERPL-MCNC: 0.57 MG/DL (ref 0.2–1)
BUN SERPL-MCNC: 16 MG/DL (ref 5–25)
C DIFF TOX GENS STL QL NAA+PROBE: NEGATIVE
CALCIUM SERPL-MCNC: 8.8 MG/DL (ref 8.4–10.2)
CAMPYLOBACTER DNA SPEC NAA+PROBE: NORMAL
CHLORIDE SERPL-SCNC: 104 MMOL/L (ref 96–108)
CO2 SERPL-SCNC: 27 MMOL/L (ref 21–32)
CREAT SERPL-MCNC: 0.81 MG/DL (ref 0.6–1.3)
ENDOMYSIUM IGA SER QL: NEGATIVE
EOSINOPHIL # BLD AUTO: 0.2 THOUSAND/ÂΜL (ref 0–0.61)
EOSINOPHIL NFR BLD AUTO: 5 % (ref 0–6)
ERYTHROCYTE [DISTWIDTH] IN BLOOD BY AUTOMATED COUNT: 13.6 % (ref 11.6–15.1)
GFR SERPL CREATININE-BSD FRML MDRD: 75 ML/MIN/1.73SQ M
GLIADIN PEPTIDE IGA SER-ACNC: 6 UNITS (ref 0–19)
GLIADIN PEPTIDE IGG SER-ACNC: 3 UNITS (ref 0–19)
GLUCOSE SERPL-MCNC: 75 MG/DL (ref 65–140)
HCT VFR BLD AUTO: 38.2 % (ref 34.8–46.1)
HGB BLD-MCNC: 12.3 G/DL (ref 11.5–15.4)
IGA SERPL-MCNC: 314 MG/DL (ref 87–352)
IMM GRANULOCYTES # BLD AUTO: 0.01 THOUSAND/UL (ref 0–0.2)
IMM GRANULOCYTES NFR BLD AUTO: 0 % (ref 0–2)
LACTATE SERPL-SCNC: 1.2 MMOL/L (ref 0.5–2)
LYMPHOCYTES # BLD AUTO: 0.78 THOUSANDS/ÂΜL (ref 0.6–4.47)
LYMPHOCYTES NFR BLD AUTO: 18 % (ref 14–44)
MCH RBC QN AUTO: 28.5 PG (ref 26.8–34.3)
MCHC RBC AUTO-ENTMCNC: 32.2 G/DL (ref 31.4–37.4)
MCV RBC AUTO: 88 FL (ref 82–98)
MONOCYTES # BLD AUTO: 0.65 THOUSAND/ÂΜL (ref 0.17–1.22)
MONOCYTES NFR BLD AUTO: 15 % (ref 4–12)
NEUTROPHILS # BLD AUTO: 2.62 THOUSANDS/ÂΜL (ref 1.85–7.62)
NEUTS SEG NFR BLD AUTO: 61 % (ref 43–75)
NRBC BLD AUTO-RTO: 0 /100 WBCS
PLATELET # BLD AUTO: 247 THOUSANDS/UL (ref 149–390)
PMV BLD AUTO: 11.1 FL (ref 8.9–12.7)
POTASSIUM SERPL-SCNC: 4.7 MMOL/L (ref 3.5–5.3)
PROT SERPL-MCNC: 6.6 G/DL (ref 6.4–8.4)
RBC # BLD AUTO: 4.32 MILLION/UL (ref 3.81–5.12)
SALMONELLA DNA SPEC QL NAA+PROBE: NORMAL
SHIGA TOXIN STX GENE SPEC NAA+PROBE: NORMAL
SHIGELLA DNA SPEC QL NAA+PROBE: NORMAL
SODIUM SERPL-SCNC: 134 MMOL/L (ref 135–147)
TTG IGA SER-ACNC: <2 U/ML (ref 0–3)
TTG IGG SER-ACNC: <2 U/ML (ref 0–5)
WBC # BLD AUTO: 4.28 THOUSAND/UL (ref 4.31–10.16)

## 2023-11-16 PROCEDURE — 99232 SBSQ HOSP IP/OBS MODERATE 35: CPT | Performed by: INTERNAL MEDICINE

## 2023-11-16 PROCEDURE — C9113 INJ PANTOPRAZOLE SODIUM, VIA: HCPCS

## 2023-11-16 PROCEDURE — 83605 ASSAY OF LACTIC ACID: CPT

## 2023-11-16 PROCEDURE — 85025 COMPLETE CBC W/AUTO DIFF WBC: CPT | Performed by: PHYSICIAN ASSISTANT

## 2023-11-16 PROCEDURE — 99232 SBSQ HOSP IP/OBS MODERATE 35: CPT | Performed by: STUDENT IN AN ORGANIZED HEALTH CARE EDUCATION/TRAINING PROGRAM

## 2023-11-16 PROCEDURE — 80053 COMPREHEN METABOLIC PANEL: CPT | Performed by: PHYSICIAN ASSISTANT

## 2023-11-16 RX ORDER — PREDNISONE 20 MG/1
40 TABLET ORAL DAILY
Status: DISCONTINUED | OUTPATIENT
Start: 2023-11-16 | End: 2023-11-17 | Stop reason: HOSPADM

## 2023-11-16 RX ORDER — OXYCODONE HYDROCHLORIDE 5 MG/1
5 TABLET ORAL EVERY 6 HOURS PRN
Status: DISCONTINUED | OUTPATIENT
Start: 2023-11-16 | End: 2023-11-17 | Stop reason: HOSPADM

## 2023-11-16 RX ADMIN — PREDNISONE 40 MG: 20 TABLET ORAL at 16:12

## 2023-11-16 RX ADMIN — QUETIAPINE FUMARATE 100 MG: 100 TABLET ORAL at 21:58

## 2023-11-16 RX ADMIN — ACETAMINOPHEN 975 MG: 325 TABLET, FILM COATED ORAL at 06:32

## 2023-11-16 RX ADMIN — PANTOPRAZOLE SODIUM 40 MG: 40 INJECTION, POWDER, FOR SOLUTION INTRAVENOUS at 10:01

## 2023-11-16 RX ADMIN — GABAPENTIN 300 MG: 300 CAPSULE ORAL at 21:58

## 2023-11-16 RX ADMIN — OXYCODONE HYDROCHLORIDE 5 MG: 5 TABLET ORAL at 16:12

## 2023-11-16 RX ADMIN — DICYCLOMINE HYDROCHLORIDE 20 MG: 20 TABLET ORAL at 06:32

## 2023-11-16 RX ADMIN — DULOXETINE HYDROCHLORIDE 60 MG: 60 CAPSULE, DELAYED RELEASE ORAL at 10:01

## 2023-11-16 RX ADMIN — SUCRALFATE 1 G: 1 TABLET ORAL at 12:54

## 2023-11-16 RX ADMIN — Medication 400 MG: at 10:01

## 2023-11-16 RX ADMIN — ENOXAPARIN SODIUM 40 MG: 40 INJECTION SUBCUTANEOUS at 10:01

## 2023-11-16 RX ADMIN — ACETAMINOPHEN 975 MG: 325 TABLET, FILM COATED ORAL at 13:17

## 2023-11-16 RX ADMIN — LEVOTHYROXINE SODIUM 50 MCG: 50 TABLET ORAL at 06:32

## 2023-11-16 RX ADMIN — GABAPENTIN 300 MG: 300 CAPSULE ORAL at 10:01

## 2023-11-16 RX ADMIN — ASPIRIN 81 MG: 81 TABLET, COATED ORAL at 10:01

## 2023-11-16 RX ADMIN — GABAPENTIN 300 MG: 300 CAPSULE ORAL at 16:13

## 2023-11-16 RX ADMIN — ACETAMINOPHEN 975 MG: 325 TABLET, FILM COATED ORAL at 21:57

## 2023-11-16 RX ADMIN — MIDODRINE HYDROCHLORIDE 10 MG: 5 TABLET ORAL at 10:01

## 2023-11-16 RX ADMIN — SUCRALFATE 1 G: 1 TABLET ORAL at 22:00

## 2023-11-16 NOTE — PROGRESS NOTES
[facility] - Inpatient Progress Note  Rosaline Urbina 80 y.o. female MRN: [id number]  Encounter: [id number]          ASSESSMENT AND PLAN:    Principal Problem:    Generalized abdominal pain  Active Problems:    Orthostatic hypotension    Adult hypothyroidism    SOB (shortness of breath) on exertion    Elevated LFTs      Abdominal pain  Patient presented with abdominal pain after a 6 day course of Prednisone for muscle spasms of the hands prescribed by her PCP. Patient reports improvement in epigastric/RUQ pain after initiation of PPI and Carafate. RUQ ultrasound ordered demonstrated hepatic steatosis. Small bowel enterography stated a 15 cm long segment of sigmoid colon with mild wall thickening likely representing active Crohn's. Patient has no Remicade infusion scheduled, thus we will start a steroid taper until she is seen by her gastroenterologist.     Elevated LFT  Patient presents with elevated LFTs - AST 42, ALT 73. Values noted to be as high as AST - 101 and ALT - 190 on 10/3. Patient is being worked up by Dr. Maria Elena Lo for possible causes. Has prior DEV antibody elevation history, has had normal AMA and anti-smooth muscle antibodies in the past. Acute hepatitis panel negative. Iron panel WNL, transferrin saturation calculated to be 34%. DEV, alpha 1 antitrypsin, AMA, Anti-smooth muscle Ab, IgG, ceruloplasmin, celiac disease WNL. Anti-histone Antibody pending. Crohn's disease on Infliximab  Small bowel enterography stated a 15 cm long segment of sigmoid colon with mild wall thickening likely representing active Crohn's. Patient has no Remicade infusion scheduled, thus we will start a steroid taper until she is seen by her gastroenterologist.       Plan:  -Start patient on prednisone 40 mg daily x 1 week, then taper by 5mg weekly (35mg X 1 week, 30mg x 1 week, 25mg x 1 week, continue on maintenance dose of 20mg daily) until she is seen by Dr. Maria Elena Lo.    - Continue PPI and carafate  - Pain management per primary team  - Outpatient follow up with GI.      ______________________________________________________________________    SUBJECTIVE:  Ms. Leslie Chance was seen and examined at the bedside, not in acute distress at the time of my evaluation. She reports 3 episodes of nonbloody diarrhea without mucus after her CT Enterography yesterday. She continues to endorse RLQ/suprapubic pain, however notes that her epigastric/RUQ pain has improved since she came in. She denies fevers, chills, nausea, vomiting, chest pain, SoB, notes improvement in cough that was present on admission. No changes in urinary habits of lower extremity edema.        Historical Information   Past Medical History:   Diagnosis Date    Abdominal adhesions     Anesthesia complication     difficult to wake up    Anxiety     Arthritis     Cancer ([address])     melanoma    Colon polyp     Depression     Depression     Disease of thyroid gland     Fibromyalgia     Fibromyalgia, primary     Fractures 2006    GERD (gastroesophageal reflux disease)     History of cholecystectomy 09/07/2019    Hyperlipidemia     Irregular heart beat     can be tachy; also has orthoststic hypotension    Lazy eye     resolved: 3/27/17    Medical clearance for psychiatric admission 07/13/2021    Melanoma ([address]) 2010    Osteoarthritis 07/2018    Osteopenia     with joint pain-elevated DEV    Psychiatric disorder     Shortness of breath     assoc with tachycardia    Stomach disorder 1995    Tinnitus     Wears glasses     for reading     Past Surgical History:   Procedure Laterality Date    ABDOMINAL SURGERY      lysis of adhesions x 2    APPENDECTOMY      CERVICAL FUSION      May 5,2021 and Jan. 01,2020    CHOLECYSTECTOMY      open    COLONOSCOPY      DILATION AND CURETTAGE OF UTERUS      FOOT SURGERY  05/2017    NECK SURGERY  01/2019 5/2021    NEUROMA EXCISION Right 05/26/2017    Procedure: EXCISION MASS / FIBROMA FOOT;  Surgeon: Winston Chapin DPM;  Location: [facility] MAIN OR;  Service:     PARS PLANA VITRECTOMY W/ REPAIR OF MACULAR HOLE  12/23/2020    WI XCAPSL CTRC RMVL INSJ IO LENS PROSTH W/O ECP Left 12/06/2021    Procedure: EXTRACTION EXTRACAPSULAR CATARACT PHACO INTRAOCULAR LENS (IOL);   Surgeon: Donavon Robins MD;  Location: [facility] MAIN OR;  Service: Ophthalmology    RIGHT OOPHORECTOMY      WISDOM TOOTH EXTRACTION      x4     Social History   Social History     Substance and Sexual Activity   Alcohol Use Not Currently     Social History     Substance and Sexual Activity   Drug Use No     Social History     Tobacco Use   Smoking Status Never   Smokeless Tobacco Never     Family History   Problem Relation Age of Onset    Heart disease Mother     Stroke Mother 58    COPD Mother     Arthritis Mother     Hypertension Father     Dislocations Sister     Multiple sclerosis Sister     Neurological problems Sister     Scoliosis Sister     No Known Problems Maternal Grandmother     No Known Problems Maternal Grandfather     No Known Problems Paternal Grandmother     No Known Problems Paternal Grandfather     Kidney disease Brother         kidney transplant    No Known Problems Maternal Aunt     No Known Problems Maternal Uncle     No Known Problems Paternal Aunt     No Known Problems Paternal Uncle     ADD / ADHD Neg Hx     Anesthesia problems Neg Hx     Cancer Neg Hx     Clotting disorder Neg Hx     Collagen disease Neg Hx     Diabetes Neg Hx     Dislocations Neg Hx     Learning disabilities Neg Hx     Neurological problems Neg Hx     Osteoporosis Neg Hx     Rheumatologic disease Neg Hx     Scoliosis Neg Hx     Vascular Disease Neg Hx        Meds/Allergies       Current Facility-Administered Medications:     acetaminophen (TYLENOL) tablet 975 mg, 975 mg, Oral, Q8H KIRBY, 975 mg at 11/16/23 0500    albuterol (PROVENTIL HFA,VENTOLIN HFA) inhaler 2 puff, 2 puff, Inhalation, Q4H PRN    aspirin (ECOTRIN LOW STRENGTH) EC tablet 81 mg, 81 mg, Oral, Daily, 81 mg at 11/15/23 1448 DULoxetine (CYMBALTA) delayed release capsule 60 mg, 60 mg, Oral, Daily, 60 mg at 11/15/23 0858    enoxaparin (LOVENOX) subcutaneous injection 40 mg, 40 mg, Subcutaneous, Daily, 40 mg at 11/15/23 0858    gabapentin (NEURONTIN) capsule 300 mg, 300 mg, Oral, TID, 300 mg at 11/15/23 2212    levothyroxine tablet 50 mcg, 50 mcg, Oral, Daily, 50 mcg at 11/16/23 8333    magnesium Oxide (MAG-OX) tablet 400 mg, 400 mg, Oral, Daily, 400 mg at 11/15/23 0858    midodrine (PROAMATINE) tablet 10 mg, 10 mg, Oral, Daily, 10 mg at 11/15/23 0858    ondansetron (ZOFRAN) oral solution 4 mg, 4 mg, Oral, Q6H PRN, 4 mg at 11/14/23 1853    pantoprazole (PROTONIX) injection 40 mg, 40 mg, Intravenous, Q24H [address], 40 mg at 11/15/23 0858    polyethylene glycol (MIRALAX) packet 17 g, 17 g, Oral, BID, 17 g at 11/14/23 1721    QUEtiapine (SEROquel) tablet 100 mg, 100 mg, Oral, HS, 100 mg at 11/15/23 2212    senna-docusate sodium (SENOKOT S) 8.6-50 mg per tablet 1 tablet, 1 tablet, Oral, BID    sodium chloride 0.9 % infusion, 100 mL/hr, Intravenous, Continuous, 100 mL/hr at 11/13/23 2001    sucralfate (CARAFATE) tablet 1 g, 1 g, Oral, BID, 1 g at 11/15/23 1559    Allergies   Allergen Reactions    Meperidine Lightheadedness and Other (See Comments)    Sulfa Antibiotics Hives       Objective     Blood pressure 116/83, pulse 87, temperature 97.6 °F (36.4 °C), resp. rate 16, height 5' 5" (1.651 m), weight 77.6 kg (171 lb), SpO2 97 %. Body mass index is 28.46 kg/m². PHYSICAL EXAM:      General Appearance:   Alert, cooperative, no distress   HEENT:   Normocephalic, atraumatic, anicteric    Neck:  Supple, symmetrical, trachea midline   Lungs:   Bilateral rhonchi improved from prior. ; respirations even and unlabored   Heart:   Regular rate and rhythm; +S1/+S2 WNL, no m/r/g   Abdomen:   Soft, non-distended; normal bowel sounds; tenderness to palpation in the suprapubic region/RLQ.  no masses, no organomegaly    Genitalia:   Deferred    Rectal:   Deferred Extremities:  No cyanosis, clubbing or edema    Pulses:  2+ and symmetric    Skin:  No jaundice, rashes, or lesions visualized          Lab Results:   No results displayed because visit has over 200 results. Radiology Results:   CT small bowel enterography    Result Date: 11/15/2023  Narrative: CT ABDOMEN AND PELVIS WITH IV CONTRAST- ENTEROGRAPHY - WITH CONTRAST INDICATION:   History of Crohns and abdominal pain. COMPARISON: TECHNIQUE:  Contrast-enhanced CT examination of the abdomen and pelvis was performed utilizing thin section technique and after the administration of low density enteric contrast according to protocol designed specifically to obtain sensitive evaluation of the small bowel. Axial, sagittal, and coronal 2D reformatted images were created from the source data and submitted for interpretation. Radiation dose length product (DLP) for this visit:  496 mGy-cm . This examination, like all CT scans performed in the [facility], was performed utilizing techniques to minimize radiation dose exposure, including the use of iterative reconstruction and automated exposure control. IV Contrast:  100 mL of iohexol (OMNIPAQUE) Enteric Contrast: 1350 cc [place] FINDINGS: ENTEROGRAPHY: -Small bowel distension: Adequate. -Bowel: There are no segments of bowel wall thickening or hyperenhancement to indicate active inflammatory bowel disease. No disproportionate dilation of the small or large bowel. There is no stricture, fistula, sinus tract, or abscess in the abdomen or pelvis. -Mesenteric findings: None -Extraintestinal findings: None There is an approximately 15 cm long segment of the sigmoid colon which shows mild wall thickening and prominence of the adjacent vasa recta with mild inner wall enhancement. Diverticula involving the second and third portions of the duodenum.  REMAINDER OF THE ABDOMEN AND PELVIS: LOWER CHEST: No clinically significant abnormality is identified in the visualized lower chest. No consolidation or effusion. LIVER: Diffuse hepatic steatosis. BILIARY: GALLBLADDER: Status post cholecystectomy. SPLEEN: Within normal limits. No suspicious lesion. Normal spleen size. PANCREAS: Pancreatic parenchyma is within normal limits. No main pancreatic ductal dilatation. No peripancreatic inflammation. ADRENAL GLANDS: Within normal limits. KIDNEYS/URETERS: Normal size and position. Symmetric enhancement. No suspicious lesion. No calcified stones or hydronephrosis. Ureters within normal limits. ABDOMINOPELVIC CAVITY: No ascites. No intraperitoneal free air. No lymphadenopathy. APPENDIX: Not visualized but no acute inflammatory process in the expected location of the appendix. VESSELS: Normal PELVIS REPRODUCTIVE ORGANS: Normal URINARY BLADDER: Collapsed but grossly unremarkable. ABDOMINAL WALL/INGUINAL REGIONS: Normal BONES: Mild degenerative changes throughout the spine. Impression: *  Inflammation: No evidence of active small bowel Crohn's disease. However, there is a 15 cm long segment of sigmoid colon which shows mild wall thickening, increased inner wall enhancement and mild engorgement of the adjacent vasa recta likely representing mild active Crohn's colitis. 1 *  Stricture: None. *  Penetrating complication: None. *  Perianal disease: None. *  Other complications: None. Workstation performed: [id number]     US right upper quadrant    Result Date: 11/15/2023  Narrative: RIGHT UPPER QUADRANT ULTRASOUND INDICATION:     RUQ pain. COMPARISON: Multiple prior studies, the most recent is a CT scan from earlier today. TECHNIQUE:   Real-time ultrasound of the right upper quadrant was performed with a curvilinear transducer with both volumetric sweeps and still imaging techniques. FINDINGS: PANCREAS:  Visualized portions of the pancreas are within normal limits. AORTA AND IVC:  Visualized portions are normal for patient age. LIVER: Size:  Within normal range.   The liver measures 14.8 cm in the midclavicular line. Contour:  Surface contour is smooth. Parenchyma: Echogenic compatible with moderate hepatic steatosis. No liver mass identified. Limited imaging of the main portal vein shows it to be patent and hepatopetal. BILIARY: Status post cholecystectomy. No intrahepatic biliary dilatation. CBD measures 6.0 mm. No choledocholithiasis. KIDNEY: Right kidney measures 9.7 x 4.5 x 4.5 cm. Volume 103.4 mL Kidney within normal limits. ASCITES:   None. Impression: Moderate hepatic steatosis. Otherwise normal right upper quadrant ultrasound exam. Workstation performed: [id number]     Echo complete w/ contrast if indicated    Result Date: 11/14/2023  Narrative:   Left Ventricle: Left ventricular cavity size is normal. Wall thickness is normal. The left ventricular ejection fraction is 60%. Systolic function is normal. Wall motion is normal. Diastolic function is normal.  Left atrial filling pressure is normal.   Left Atrium: The atrium is normal in size. IVC/SVC: The right atrial pressure is estimated at 3.0 mmHg. The inferior vena cava is normal in size. Respirophasic changes were normal.     PE Study with CT abdomen & pelvis with contrast    Result Date: 11/13/2023  Narrative: CT PULMONARY ANGIOGRAM OF THE CHEST AND CT ABDOMEN AND PELVIS WITH INTRAVENOUS CONTRAST INDICATION:   sob w/ exertion, tachycardia, bilateral rhonchi r.o PE, PNA. has crohns, ab pain x 3 days. r/o ab pathologies. COMPARISON: CT abdomen/pelvis 10/3/2023. CT chest 9/15/2023. MRI abdomen 7/25/2023. TECHNIQUE:  CT examination of the chest, abdomen and pelvis was performed. Thin section CT angiographic technique was used in the chest in order to evaluate for pulmonary embolus and coronal 3D MIP postprocessing was performed on the acquisition scanner. Axial, sagittal, and coronal 2D reformatted images were created from the source data and submitted for interpretation.  Radiation dose length product (DLP) for this visit:  1077 mGy-cm .  This examination, like all CT scans performed in the [facility], was performed utilizing techniques to minimize radiation dose exposure, including the use of iterative reconstruction and automated exposure control. IV Contrast:  100 mL of iohexol (OMNIPAQUE) Enteric Contrast: None FINDINGS: PULMONARY ARTERIAL TREE:  No filling defects to suggest acute or chronic pulmonary embolism in the entire pulmonary arterial system. Normal caliber main pulmonary artery. LARGE AIRWAYS: Large airways are clear with no tracheal or endobronchial lesion. LUNGS: Somewhat limited evaluation of the lung parenchyma due to respiratory motion artifact. There is suggestion of mild mosaic attenuation compatible with small vessel/small airways disease. No suspicious lesions. No consolidation. No edema. PLEURA: No pleural effusion or pneumothorax. HEART: Normal cardiac size and morphology. No Coronary artery calcification. No pericardial effusion or thickening. VESSELS: Normal caliber thoracic aorta with no detectable atherosclerotic plaque. MEDIASTINUM AND LESLIE: Top-normal mediastinal lymph nodes measure up to 10 mm in short axis. No lymphadenopathy. Small hiatal hernia. CHEST WALL AND LOWER NECK:   Left chest wall implantable cardiac defibrillator. ABDOMEN: LIVER: Normal size and morphology. No suspicious lesion. BILIARY: No intrahepatic biliary ductal dilatation. Normal caliber common bile duct. GALLBLADDER: Gallbladder is surgically absent. SPLEEN: Within normal limits. No suspicious lesion. Normal spleen size. PANCREAS: Mildly atrophic. Known small pancreatic cysts better evaluated on prior MRI; see associated recommendations. No pancreatic/peripancreatic inflammation. ADRENAL GLANDS: Within normal limits. KIDNEYS/URETERS: Normal size and position. Symmetric enhancement. No suspicious lesion. No calcified stones or hydronephrosis. Ureters within normal limits.  STOMACH AND BOWEL: Stomach is grossly within normal limits. Normal caliber small bowel. Normal caliber large bowel. No evidence of active small or large bowel inflammatory process. APPENDIX: No findings to suggest acute appendicitis. ABDOMINOPELVIC CAVITY: No ascites. No intraperitoneal free air. No lymphadenopathy. No retroperitoneal hematoma. VESSELS: Normal caliber abdominal aorta with no detectable atherosclerotic plaque. The celiac, SMA, and LIAM are patent. The main, right, and left portal veins are patent. The SMV and splenic vein are patent. The hepatic veins are patent. PELVIS REPRODUCTIVE ORGANS: Anteverted uterus. There is no evidence of adnexal mass. URINARY BLADDER: Within normal limits. No calculi. ABDOMINAL WALL/INGUINAL REGIONS: Within normal limits. BONES: Mild multilevel degenerative changes in the spine. Vertebral body height is maintained. No acute fracture or destructive osseous lesion. Degenerative changes at the hips. Impression: 1. No filling defect to suggest acute or chronic pulmonary embolism in the entire pulmonary arterial system. 2.  Suggestion of mosaic lung parenchymal attenuation compatible with mild small vessel/small airways disease. 3.  No acute findings in the abdomen or pelvis. Workstation performed: [id number]     XR chest pa & lateral    Result Date: 11/8/2023  Narrative: CHEST INDICATION:   R05.1: Acute cough. COMPARISON: Compared with 1/26/2023 EXAM PERFORMED/VIEWS:  XR CHEST PA & LATERAL FINDINGS: Left lower chest loop recorder. Cardiomediastinal silhouette appears unremarkable. The lungs are clear. No pneumothorax or pleural effusion. Osseous structures appear within normal limits for patient age. Impression: No acute cardiopulmonary disease. Workstation performed: [id number]       The patient was seen and examined by Dr. Faye Das, all nassar medical decisions were made with Dr. Faye Das. Thank you for allowing us to participate in the care of this pleasant patient. We will follow up with you closely.

## 2023-11-16 NOTE — ASSESSMENT & PLAN NOTE
Presented with few day history of dyspnea on exertion, nonproductive cough, and inability to lie flat  Pt sleeps with 2 pillows at home   BNP normal 14  Saturating well on RA   CTA PE Study- Negative for PE, mosaic pattern in the lung parenchyma concerning for small vessel/ small airway disease  Per PCP-patient has been prescribed nebulized treatments and inhalers for this intermittent episodes of SOB  Echo - LVEF 60%, normal function  Currently saturating well on RA   SOB improved     Plan:   Monitor oxygenation on room air   Albuterol inhaler prn   Imaging reviewed with pulmonology, outpatient referral sent for PFTs and workup of asthma/small airway disease

## 2023-11-16 NOTE — ASSESSMENT & PLAN NOTE
Pt presented with mildly elevated LFTs AST 42, ALT 73  This morning AST 46, ALT 73  Follows with GI outpatient who are pursuing workup - DEV elevated but anti-smooth muscle and antimitochondrial antibodies normal. No alcohol in more than 20 years. 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 Quantitative immunoglobulins wnl.    Iron panel wnl   Hepatitis panel negative  Celiac paneL/AMA/ anti-smooth muscle antibody/ alpha-1 antitrypsin/ceruloplasmin WNL     Plan:   Anti-histone Antibody pending  Outpatient GI follow up

## 2023-11-16 NOTE — ASSESSMENT & PLAN NOTE
Presented with generalized burning abdominal pain, nausea, and loose stools. Diagnosed with Crohn's in May 2023 - treated with Infliximab infusions every 8 weeks. Last infusion 9/29, incomplete dose due to loss of IV access   Reports many loose stools with mucus on Saturday, decreased stools on Sunday - has not had a BM in hospital   She reports common fecal urgency at home   She has decreased appetite and oral hydration  She denies sick contacts or recent travel  CTA PE w/ AP- No intraabdominal process seen, large stool burden   CRP elevated  RUQ US demonstrates moderate hepatic steatosis   CT enterography demonstrates 15 cm long segment of sigmoid colon which shows mild wall thickening, increased inner wall enhancement and mild engorgement of the adjacent vasa recta likely representing mild active Crohn's colitis    Plan:  Stool studies   Fecal Calprotectin pending   Stool Enteric Panel WNL  Ova and Parasite pending   C Diff WNL   GI recommendations noted. Start Prednisone taper which patient will be discharged on until outpatient GI follow up.    Bowel regimen

## 2023-11-17 ENCOUNTER — TELEPHONE (OUTPATIENT)
Age: 68
End: 2023-11-17

## 2023-11-17 VITALS
RESPIRATION RATE: 13 BRPM | DIASTOLIC BLOOD PRESSURE: 83 MMHG | OXYGEN SATURATION: 90 % | SYSTOLIC BLOOD PRESSURE: 145 MMHG | HEART RATE: 87 BPM | HEIGHT: 65 IN | TEMPERATURE: 97.9 F | WEIGHT: 171 LBS | BODY MASS INDEX: 28.49 KG/M2

## 2023-11-17 PROBLEM — R93.89 ABNORMAL CT OF THE CHEST: Status: ACTIVE | Noted: 2023-11-17

## 2023-11-17 LAB
ALBUMIN SERPL BCP-MCNC: 3.4 G/DL (ref 3.5–5)
ALP SERPL-CCNC: 73 U/L (ref 34–104)
ALT SERPL W P-5'-P-CCNC: 53 U/L (ref 7–52)
ANION GAP SERPL CALCULATED.3IONS-SCNC: 7 MMOL/L
AST SERPL W P-5'-P-CCNC: 39 U/L (ref 13–39)
BILIRUB SERPL-MCNC: 0.37 MG/DL (ref 0.2–1)
BUN SERPL-MCNC: 11 MG/DL (ref 5–25)
CALCIUM ALBUM COR SERPL-MCNC: 9 MG/DL (ref 8.3–10.1)
CALCIUM SERPL-MCNC: 8.5 MG/DL (ref 8.4–10.2)
CHLORIDE SERPL-SCNC: 108 MMOL/L (ref 96–108)
CO2 SERPL-SCNC: 22 MMOL/L (ref 21–32)
CREAT SERPL-MCNC: 0.56 MG/DL (ref 0.6–1.3)
ERYTHROCYTE [DISTWIDTH] IN BLOOD BY AUTOMATED COUNT: 13.2 % (ref 11.6–15.1)
GFR SERPL CREATININE-BSD FRML MDRD: 96 ML/MIN/1.73SQ M
GLUCOSE SERPL-MCNC: 108 MG/DL (ref 65–140)
HCT VFR BLD AUTO: 39.1 % (ref 34.8–46.1)
HGB BLD-MCNC: 12.4 G/DL (ref 11.5–15.4)
HISTONE IGG SER IA-ACNC: 0.8 UNITS (ref 0–0.9)
MCH RBC QN AUTO: 28.5 PG (ref 26.8–34.3)
MCHC RBC AUTO-ENTMCNC: 31.7 G/DL (ref 31.4–37.4)
MCV RBC AUTO: 90 FL (ref 82–98)
PLATELET # BLD AUTO: 207 THOUSANDS/UL (ref 149–390)
PMV BLD AUTO: 10.2 FL (ref 8.9–12.7)
POTASSIUM SERPL-SCNC: 4.4 MMOL/L (ref 3.5–5.3)
PROT SERPL-MCNC: 5.9 G/DL (ref 6.4–8.4)
RBC # BLD AUTO: 4.35 MILLION/UL (ref 3.81–5.12)
SODIUM SERPL-SCNC: 137 MMOL/L (ref 135–147)
WBC # BLD AUTO: 4.48 THOUSAND/UL (ref 4.31–10.16)

## 2023-11-17 PROCEDURE — 99232 SBSQ HOSP IP/OBS MODERATE 35: CPT | Performed by: STUDENT IN AN ORGANIZED HEALTH CARE EDUCATION/TRAINING PROGRAM

## 2023-11-17 PROCEDURE — 85027 COMPLETE CBC AUTOMATED: CPT | Performed by: INTERNAL MEDICINE

## 2023-11-17 PROCEDURE — C9113 INJ PANTOPRAZOLE SODIUM, VIA: HCPCS

## 2023-11-17 PROCEDURE — 80053 COMPREHEN METABOLIC PANEL: CPT | Performed by: INTERNAL MEDICINE

## 2023-11-17 PROCEDURE — 99239 HOSP IP/OBS DSCHRG MGMT >30: CPT | Performed by: INTERNAL MEDICINE

## 2023-11-17 RX ORDER — MIDODRINE HYDROCHLORIDE 10 MG/1
10 TABLET ORAL DAILY
Qty: 30 TABLET | Refills: 0 | Status: SHIPPED | OUTPATIENT
Start: 2023-11-18 | End: 2023-12-18

## 2023-11-17 RX ORDER — SUCRALFATE 1 G/1
1 TABLET ORAL 2 TIMES DAILY
Qty: 60 TABLET | Refills: 0 | Status: SHIPPED | OUTPATIENT
Start: 2023-11-17 | End: 2023-12-17

## 2023-11-17 RX ORDER — PREDNISONE 10 MG/1
TABLET ORAL
Qty: 105 TABLET | Refills: 0 | Status: SHIPPED | OUTPATIENT
Start: 2023-11-17 | End: 2023-12-22

## 2023-11-17 RX ADMIN — ENOXAPARIN SODIUM 40 MG: 40 INJECTION SUBCUTANEOUS at 08:39

## 2023-11-17 RX ADMIN — MIDODRINE HYDROCHLORIDE 10 MG: 5 TABLET ORAL at 08:39

## 2023-11-17 RX ADMIN — PREDNISONE 40 MG: 20 TABLET ORAL at 08:39

## 2023-11-17 RX ADMIN — PANTOPRAZOLE SODIUM 40 MG: 40 INJECTION, POWDER, FOR SOLUTION INTRAVENOUS at 08:39

## 2023-11-17 RX ADMIN — DULOXETINE HYDROCHLORIDE 60 MG: 60 CAPSULE, DELAYED RELEASE ORAL at 08:39

## 2023-11-17 RX ADMIN — OXYCODONE HYDROCHLORIDE 5 MG: 5 TABLET ORAL at 10:58

## 2023-11-17 RX ADMIN — ACETAMINOPHEN 975 MG: 325 TABLET, FILM COATED ORAL at 05:27

## 2023-11-17 RX ADMIN — POLYETHYLENE GLYCOL 3350 17 G: 17 POWDER, FOR SOLUTION ORAL at 08:39

## 2023-11-17 RX ADMIN — Medication 400 MG: at 08:39

## 2023-11-17 RX ADMIN — ACETAMINOPHEN 975 MG: 325 TABLET, FILM COATED ORAL at 15:14

## 2023-11-17 RX ADMIN — GABAPENTIN 300 MG: 300 CAPSULE ORAL at 08:39

## 2023-11-17 RX ADMIN — SUCRALFATE 1 G: 1 TABLET ORAL at 11:00

## 2023-11-17 RX ADMIN — ASPIRIN 81 MG: 81 TABLET, COATED ORAL at 08:39

## 2023-11-17 RX ADMIN — SENNOSIDES AND DOCUSATE SODIUM 1 TABLET: 8.6; 5 TABLET ORAL at 08:39

## 2023-11-17 RX ADMIN — LEVOTHYROXINE SODIUM 50 MCG: 50 TABLET ORAL at 05:27

## 2023-11-17 RX ADMIN — GABAPENTIN 300 MG: 300 CAPSULE ORAL at 15:14

## 2023-11-17 NOTE — PROGRESS NOTES
[facility] - Inpatient Progress Note  Cristina Jimenez 80 y.o. female MRN: [id number]  Encounter: [id number]          ASSESSMENT AND PLAN:    Principal Problem:    Generalized abdominal pain  Active Problems:    Orthostatic hypotension    Adult hypothyroidism    SOB (shortness of breath) on exertion    Elevated LFTs      Abdominal pain  Patient presented with abdominal pain after a 6 day course of Prednisone for muscle spasms of the hands prescribed by her PCP. Patient reports improvement in epigastric/RUQ pain after initiation of PPI and Carafate. RUQ ultrasound ordered demonstrated hepatic steatosis. Small bowel enterography stated a 15 cm long segment of sigmoid colon with mild wall thickening likely representing active Crohn's. Patient has no Remicade infusion scheduled, thus we will start a steroid taper until she is seen by her gastroenterologist.     Elevated LFT  Patient presents with elevated LFTs - AST 42, ALT 73 (Currently AST normal at 39 and ALT 53 with remaining LFT's normal). Values noted to be as high as AST - 101 and ALT - 190 on 10/3. Patient is being worked up by Dr. Denys Diaz for possible causes. Has prior DEV antibody elevation history, has had normal AMA and anti-smooth muscle antibodies in the past. Acute hepatitis panel negative. Iron panel WNL, transferrin saturation calculated to be 34%. DEV, alpha 1 antitrypsin, AMA, Anti-smooth muscle Ab, IgG, ceruloplasmin, celiac disease WNL. Anti-histone Antibody pending. Crohn's disease on Infliximab  Small bowel enterography stated a 15 cm long segment of sigmoid colon with mild wall thickening likely representing active Crohn's.   Patient has no Remicade infusion scheduled, thus we will start a steroid taper until she is seen by her gastroenterologist.       Plan:  -Started patient on prednisone on 11/16/23 at 40 mg daily x 1 week, then taper by 5mg weekly (35mg X 1 week, 30mg x 1 week, 25mg x 1 week, continue on maintenance dose of 20mg daily) until she is seen by Dr. Carver Goodell. - Continue PPI and carafate  - Pain management per primary team  - Outpatient follow up with GI (Dr. Carver Goodell)    Patient stable for discharge from GI perspective. She has upcoming appointment with Dr. Carver Goodell on 12/11/23      ______________________________________________________________________    SUBJECTIVE:  CT Enterography 11/15/23 with a 15 cm long segment of sigmoid colon with mild wall thickening likely representing active Crohn's. She was started on a Prednisone taper on Thursday 11/16/23. She still reports pain, but feels well enough to go home. She is tolerating her diet, passing gas and moving her bowels.       Historical Information   Past Medical History:   Diagnosis Date    Abdominal adhesions     Anesthesia complication     difficult to wake up    Anxiety     Arthritis     Cancer ([address])     melanoma    Colon polyp     Depression     Depression     Disease of thyroid gland     Fibromyalgia     Fibromyalgia, primary     Fractures 2006    GERD (gastroesophageal reflux disease)     History of cholecystectomy 09/07/2019    Hyperlipidemia     Irregular heart beat     can be tachy; also has orthoststic hypotension    Lazy eye     resolved: 3/27/17    Medical clearance for psychiatric admission 07/13/2021    Melanoma ([address]) 2010    Osteoarthritis 07/2018    Osteopenia     with joint pain-elevated DEV    Psychiatric disorder     Shortness of breath     assoc with tachycardia    Stomach disorder 1995    Tinnitus     Wears glasses     for reading     Past Surgical History:   Procedure Laterality Date    ABDOMINAL SURGERY      lysis of adhesions x 2    APPENDECTOMY      CERVICAL FUSION      May 5,2021 and Jan. 01,2020    CHOLECYSTECTOMY      open    COLONOSCOPY      DILATION AND CURETTAGE OF UTERUS      FOOT SURGERY  05/2017    NECK SURGERY  01/2019 5/2021    NEUROMA EXCISION Right 05/26/2017    Procedure: EXCISION MASS / FIBROMA FOOT; Surgeon: Yohana Carlson DPM;  Location: [facility];  Service:     PARS PLANA VITRECTOMY W/ REPAIR OF MACULAR HOLE  12/23/2020    TN XCAPSL CTRC RMVL INSJ IO LENS PROSTH W/O ECP Left 12/06/2021    Procedure: EXTRACTION EXTRACAPSULAR CATARACT PHACO INTRAOCULAR LENS (IOL);   Surgeon: Cj Stephen MD;  Location: [facility] MAIN OR;  Service: Ophthalmology    RIGHT OOPHORECTOMY      WISDOM TOOTH EXTRACTION      x4     Social History   Social History     Substance and Sexual Activity   Alcohol Use Not Currently     Social History     Substance and Sexual Activity   Drug Use No     Social History     Tobacco Use   Smoking Status Never   Smokeless Tobacco Never     Family History   Problem Relation Age of Onset    Heart disease Mother     Stroke Mother 58    COPD Mother     Arthritis Mother     Hypertension Father     Dislocations Sister     Multiple sclerosis Sister     Neurological problems Sister     Scoliosis Sister     No Known Problems Maternal Grandmother     No Known Problems Maternal Grandfather     No Known Problems Paternal Grandmother     No Known Problems Paternal Grandfather     Kidney disease Brother         kidney transplant    No Known Problems Maternal Aunt     No Known Problems Maternal Uncle     No Known Problems Paternal Aunt     No Known Problems Paternal Uncle     ADD / ADHD Neg Hx     Anesthesia problems Neg Hx     Cancer Neg Hx     Clotting disorder Neg Hx     Collagen disease Neg Hx     Diabetes Neg Hx     Dislocations Neg Hx     Learning disabilities Neg Hx     Neurological problems Neg Hx     Osteoporosis Neg Hx     Rheumatologic disease Neg Hx     Scoliosis Neg Hx     Vascular Disease Neg Hx        Meds/Allergies       Current Facility-Administered Medications:     acetaminophen (TYLENOL) tablet 975 mg, 975 mg, Oral, Q8H KIRBY, 975 mg at 11/17/23 0527    albuterol (PROVENTIL HFA,VENTOLIN HFA) inhaler 2 puff, 2 puff, Inhalation, Q4H PRN    aspirin (ECOTRIN LOW STRENGTH) EC tablet 81 mg, 81 mg, Oral, Daily, 81 mg at 11/17/23 0839    DULoxetine (CYMBALTA) delayed release capsule 60 mg, 60 mg, Oral, Daily, 60 mg at 11/17/23 0839    enoxaparin (LOVENOX) subcutaneous injection 40 mg, 40 mg, Subcutaneous, Daily, 40 mg at 11/17/23 0839    gabapentin (NEURONTIN) capsule 300 mg, 300 mg, Oral, TID, 300 mg at 11/17/23 0839    levothyroxine tablet 50 mcg, 50 mcg, Oral, Daily, 50 mcg at 11/17/23 0527    magnesium Oxide (MAG-OX) tablet 400 mg, 400 mg, Oral, Daily, 400 mg at 11/17/23 0839    midodrine (PROAMATINE) tablet 10 mg, 10 mg, Oral, Daily, 10 mg at 11/16/23 1001    ondansetron (ZOFRAN) oral solution 4 mg, 4 mg, Oral, Q6H PRN, 4 mg at 11/14/23 1853    oxyCODONE (ROXICODONE) IR tablet 5 mg, 5 mg, Oral, Q6H PRN, 5 mg at 11/16/23 1612    oxyCODONE (ROXICODONE) split tablet 2.5 mg, 2.5 mg, Oral, Q6H PRN    pantoprazole (PROTONIX) injection 40 mg, 40 mg, Intravenous, Q24H KIRBY, 40 mg at 11/17/23 0839    polyethylene glycol (MIRALAX) packet 17 g, 17 g, Oral, BID, 17 g at 11/17/23 0839    predniSONE tablet 40 mg, 40 mg, Oral, Daily, 40 mg at 11/17/23 0839    QUEtiapine (SEROquel) tablet 100 mg, 100 mg, Oral, HS, 100 mg at 11/16/23 2158    senna-docusate sodium (SENOKOT S) 8.6-50 mg per tablet 1 tablet, 1 tablet, Oral, BID, 1 tablet at 11/17/23 0839    sodium chloride 0.9 % infusion, 100 mL/hr, Intravenous, Continuous, 100 mL/hr at 11/17/23 0611    sucralfate (CARAFATE) tablet 1 g, 1 g, Oral, BID, 1 g at 11/16/23 2200    Allergies   Allergen Reactions    Meperidine Lightheadedness and Other (See Comments)    Sulfa Antibiotics Hives       Objective     Blood pressure 132/64, pulse 102, temperature 97.7 °F (36.5 °C), resp. rate 15, height 5' 5" (1.651 m), weight 77.6 kg (171 lb), SpO2 97 %. Body mass index is 28.46 kg/m².       PHYSICAL EXAM:      General Appearance:   Alert, cooperative, no distress   HEENT:   Normocephalic, atraumatic, anicteric    Neck:  Supple, symmetrical, trachea midline   Lungs:   CTA B/L   Heart:   Regular rate and rhythm; +S1/+S2 WNL,   Abdomen:   Soft, non-distended; normal bowel sounds; tenderness to palpation in the suprapubic region/RLQ. no masses, no organomegaly    Genitalia:   Deferred    Rectal:   Deferred    Extremities:  No cyanosis, clubbing or edema    Pulses:  Not assessed   Skin:  No jaundice, rashes, or lesions visualized          Lab Results:   No results displayed because visit has over 200 results. Radiology Results:   CT small bowel enterography    Result Date: 11/15/2023  Narrative: CT ABDOMEN AND PELVIS WITH IV CONTRAST- ENTEROGRAPHY - WITH CONTRAST INDICATION:   History of Crohns and abdominal pain. COMPARISON: TECHNIQUE:  Contrast-enhanced CT examination of the abdomen and pelvis was performed utilizing thin section technique and after the administration of low density enteric contrast according to protocol designed specifically to obtain sensitive evaluation of the small bowel. Axial, sagittal, and coronal 2D reformatted images were created from the source data and submitted for interpretation. Radiation dose length product (DLP) for this visit:  496 mGy-cm . This examination, like all CT scans performed in the [facility], was performed utilizing techniques to minimize radiation dose exposure, including the use of iterative reconstruction and automated exposure control. IV Contrast:  100 mL of iohexol (OMNIPAQUE) Enteric Contrast: 1350 cc [place] FINDINGS: ENTEROGRAPHY: -Small bowel distension: Adequate. -Bowel: There are no segments of bowel wall thickening or hyperenhancement to indicate active inflammatory bowel disease. No disproportionate dilation of the small or large bowel. There is no stricture, fistula, sinus tract, or abscess in the abdomen or pelvis.  -Mesenteric findings: None -Extraintestinal findings: None There is an approximately 15 cm long segment of the sigmoid colon which shows mild wall thickening and prominence of the adjacent vasa recta with mild inner wall enhancement. Diverticula involving the second and third portions of the duodenum. REMAINDER OF THE ABDOMEN AND PELVIS: LOWER CHEST: No clinically significant abnormality is identified in the visualized lower chest. No consolidation or effusion. LIVER: Diffuse hepatic steatosis. BILIARY: GALLBLADDER: Status post cholecystectomy. SPLEEN: Within normal limits. No suspicious lesion. Normal spleen size. PANCREAS: Pancreatic parenchyma is within normal limits. No main pancreatic ductal dilatation. No peripancreatic inflammation. ADRENAL GLANDS: Within normal limits. KIDNEYS/URETERS: Normal size and position. Symmetric enhancement. No suspicious lesion. No calcified stones or hydronephrosis. Ureters within normal limits. ABDOMINOPELVIC CAVITY: No ascites. No intraperitoneal free air. No lymphadenopathy. APPENDIX: Not visualized but no acute inflammatory process in the expected location of the appendix. VESSELS: Normal PELVIS REPRODUCTIVE ORGANS: Normal URINARY BLADDER: Collapsed but grossly unremarkable. ABDOMINAL WALL/INGUINAL REGIONS: Normal BONES: Mild degenerative changes throughout the spine. Impression: *  Inflammation: No evidence of active small bowel Crohn's disease. However, there is a 15 cm long segment of sigmoid colon which shows mild wall thickening, increased inner wall enhancement and mild engorgement of the adjacent vasa recta likely representing mild active Crohn's colitis. 1 *  Stricture: None. *  Penetrating complication: None. *  Perianal disease: None. *  Other complications: None. Workstation performed: [id number]     US right upper quadrant    Result Date: 11/15/2023  Narrative: RIGHT UPPER QUADRANT ULTRASOUND INDICATION:     RUQ pain. COMPARISON: Multiple prior studies, the most recent is a CT scan from earlier today. TECHNIQUE:   Real-time ultrasound of the right upper quadrant was performed with a curvilinear transducer with both volumetric sweeps and still imaging techniques. FINDINGS: PANCREAS:  Visualized portions of the pancreas are within normal limits. AORTA AND IVC:  Visualized portions are normal for patient age. LIVER: Size:  Within normal range. The liver measures 14.8 cm in the midclavicular line. Contour:  Surface contour is smooth. Parenchyma: Echogenic compatible with moderate hepatic steatosis. No liver mass identified. Limited imaging of the main portal vein shows it to be patent and hepatopetal. BILIARY: Status post cholecystectomy. No intrahepatic biliary dilatation. CBD measures 6.0 mm. No choledocholithiasis. KIDNEY: Right kidney measures 9.7 x 4.5 x 4.5 cm. Volume 103.4 mL Kidney within normal limits. ASCITES:   None. Impression: Moderate hepatic steatosis. Otherwise normal right upper quadrant ultrasound exam. Workstation performed: [id number]     Echo complete w/ contrast if indicated    Result Date: 11/14/2023  Narrative:   Left Ventricle: Left ventricular cavity size is normal. Wall thickness is normal. The left ventricular ejection fraction is 60%. Systolic function is normal. Wall motion is normal. Diastolic function is normal.  Left atrial filling pressure is normal.   Left Atrium: The atrium is normal in size. IVC/SVC: The right atrial pressure is estimated at 3.0 mmHg. The inferior vena cava is normal in size. Respirophasic changes were normal.     PE Study with CT abdomen & pelvis with contrast    Result Date: 11/13/2023  Narrative: CT PULMONARY ANGIOGRAM OF THE CHEST AND CT ABDOMEN AND PELVIS WITH INTRAVENOUS CONTRAST INDICATION:   sob w/ exertion, tachycardia, bilateral rhonchi r.o PE, PNA. has crohns, ab pain x 3 days. r/o ab pathologies. COMPARISON: CT abdomen/pelvis 10/3/2023. CT chest 9/15/2023. MRI abdomen 7/25/2023. TECHNIQUE:  CT examination of the chest, abdomen and pelvis was performed.   Thin section CT angiographic technique was used in the chest in order to evaluate for pulmonary embolus and coronal 3D MIP postprocessing was performed on the acquisition scanner. Axial, sagittal, and coronal 2D reformatted images were created from the source data and submitted for interpretation. Radiation dose length product (DLP) for this visit:  1077 mGy-cm . This examination, like all CT scans performed in the [facility], was performed utilizing techniques to minimize radiation dose exposure, including the use of iterative reconstruction and automated exposure control. IV Contrast:  100 mL of iohexol (OMNIPAQUE) Enteric Contrast: None FINDINGS: PULMONARY ARTERIAL TREE:  No filling defects to suggest acute or chronic pulmonary embolism in the entire pulmonary arterial system. Normal caliber main pulmonary artery. LARGE AIRWAYS: Large airways are clear with no tracheal or endobronchial lesion. LUNGS: Somewhat limited evaluation of the lung parenchyma due to respiratory motion artifact. There is suggestion of mild mosaic attenuation compatible with small vessel/small airways disease. No suspicious lesions. No consolidation. No edema. PLEURA: No pleural effusion or pneumothorax. HEART: Normal cardiac size and morphology. No Coronary artery calcification. No pericardial effusion or thickening. VESSELS: Normal caliber thoracic aorta with no detectable atherosclerotic plaque. MEDIASTINUM AND LESLIE: Top-normal mediastinal lymph nodes measure up to 10 mm in short axis. No lymphadenopathy. Small hiatal hernia. CHEST WALL AND LOWER NECK:   Left chest wall implantable cardiac defibrillator. ABDOMEN: LIVER: Normal size and morphology. No suspicious lesion. BILIARY: No intrahepatic biliary ductal dilatation. Normal caliber common bile duct. GALLBLADDER: Gallbladder is surgically absent. SPLEEN: Within normal limits. No suspicious lesion. Normal spleen size. PANCREAS: Mildly atrophic. Known small pancreatic cysts better evaluated on prior MRI; see associated recommendations. No pancreatic/peripancreatic inflammation. ADRENAL GLANDS: Within normal limits. KIDNEYS/URETERS: Normal size and position. Symmetric enhancement. No suspicious lesion. No calcified stones or hydronephrosis. Ureters within normal limits. STOMACH AND BOWEL: Stomach is grossly within normal limits. Normal caliber small bowel. Normal caliber large bowel. No evidence of active small or large bowel inflammatory process. APPENDIX: No findings to suggest acute appendicitis. ABDOMINOPELVIC CAVITY: No ascites. No intraperitoneal free air. No lymphadenopathy. No retroperitoneal hematoma. VESSELS: Normal caliber abdominal aorta with no detectable atherosclerotic plaque. The celiac, SMA, and LIAM are patent. The main, right, and left portal veins are patent. The SMV and splenic vein are patent. The hepatic veins are patent. PELVIS REPRODUCTIVE ORGANS: Anteverted uterus. There is no evidence of adnexal mass. URINARY BLADDER: Within normal limits. No calculi. ABDOMINAL WALL/INGUINAL REGIONS: Within normal limits. BONES: Mild multilevel degenerative changes in the spine. Vertebral body height is maintained. No acute fracture or destructive osseous lesion. Degenerative changes at the hips. Impression: 1. No filling defect to suggest acute or chronic pulmonary embolism in the entire pulmonary arterial system. 2.  Suggestion of mosaic lung parenchymal attenuation compatible with mild small vessel/small airways disease. 3.  No acute findings in the abdomen or pelvis. Workstation performed: [id number]     XR chest pa & lateral    Result Date: 11/8/2023  Narrative: CHEST INDICATION:   R05.1: Acute cough. COMPARISON: Compared with 1/26/2023 EXAM PERFORMED/VIEWS:  XR CHEST PA & LATERAL FINDINGS: Left lower chest loop recorder. Cardiomediastinal silhouette appears unremarkable. The lungs are clear. No pneumothorax or pleural effusion. Osseous structures appear within normal limits for patient age. Impression: No acute cardiopulmonary disease.  Workstation performed: [id number]       The patient was seen and examined by Dr. Filiberto Diana, all nassar medical decisions were made with Dr. Filiberto Diana. Thank you for allowing us to participate in the care of this pleasant patient. We will follow up with you closely. Aleks Rojo PA-C  Gastroentrology

## 2023-11-17 NOTE — TELEPHONE ENCOUNTER
Patients GI provider:  Dr. Анна Rowland    Number to return call: [phone]    Reason for call: [facility] called and stated patient no longer wants to be on Remicade and they will a call to back to very discontinuation of medication please review and reach out at [phone]     Scheduled procedure/appointment date if applicable: appt 66/69/4294

## 2023-11-17 NOTE — DISCHARGE INSTR - AVS FIRST PAGE
Dear Vane Cramer,     It was our pleasure to care for you here at [facility]. It is our hope that we were always able to exceed the expected standards for your care during your stay. You were hospitalized due to abdominal pain. You were cared for on the fourth floor by Terrie Pritchett DO under the service of Maryjane Solo MD with the [facility] who covers for your primary care physician (PCP), Amina Bartholomew MD, while you were hospitalized. If you have any questions or concerns related to this hospitalization, you may contact us at [phone]. For follow up as well as any medication refills, we recommend that you follow up with your primary care physician. A registered nurse will reach out to you by phone within a few days after your discharge to answer any additional questions that you may have after going home. However, at this time we provide for you here, the most important instructions / recommendations at discharge:     Notable Medication Adjustments -   Please start taking prednisone 40 mg daily x1 week, then taper by 5 mg weekly. (35 mg x 1 week, 30 mg x 1 week, 25 mg x 1 week, continue maintenance dose of 20 mg daily until seen by Dr. Augustina Abdul on 12/11/2023) for Crohn's flare. Please start taking Carafate 1 g, twice daily. Please decrease your Midodrine 10 mg from twice daily to once daily. Testing Required after Discharge -   None  Important follow up information -   Please follow-up with the PCP in a week. Please follow-up with your gastroenterologist, Dr. Augustina Abdul on 12/11/2023. You have a referral for pulmonology to be evaluated for asthma or other small airway diseases. Other Instructions -   Should your symptoms return or if you have any fever, chills, chest pain, shortness of breath, abdominal pain, nausea, vomiting, bloody diarrhea please contact your PCP or return to the ED immediately for further evaluation.   Please review this entire after visit summary as additional general instructions including medication list, appointments, activity, diet, any pertinent wound care, and other additional recommendations from your care team that may be provided for you.       Sincerely,     Arelis Camarena, DO

## 2023-11-17 NOTE — PROGRESS NOTES
[facility]  Progress Note  Name: Crystal Cantor  MRN: [id number]  Unit/Bed#: W -02 I Date of Admission: 11/13/2023   Date of Service: 11/16/2023 I Hospital Day: 1    Assessment/Plan   * Generalized abdominal pain  Assessment & Plan  Presented with generalized burning abdominal pain, nausea, and loose stools. Diagnosed with Crohn's in May 2023 - treated with Infliximab infusions every 8 weeks. Last infusion 9/29, incomplete dose due to loss of IV access   Reports many loose stools with mucus on Saturday, decreased stools on Sunday - has not had a BM in hospital   She reports common fecal urgency at home   She has decreased appetite and oral hydration  She denies sick contacts or recent travel  CTA PE w/ AP- No intraabdominal process seen, large stool burden   CRP elevated  RUQ US demonstrates moderate hepatic steatosis   CT enterography demonstrates 15 cm long segment of sigmoid colon which shows mild wall thickening, increased inner wall enhancement and mild engorgement of the adjacent vasa recta likely representing mild active Crohn's colitis    Plan:  Stool studies   Fecal Calprotectin pending   Stool Enteric Panel WNL  Ova and Parasite pending   C Diff WNL   GI recommendations noted. Start Prednisone taper which patient will be discharged on until outpatient GI follow up.    Bowel regimen     SOB (shortness of breath) on exertion  Assessment & Plan  Presented with few day history of dyspnea on exertion, nonproductive cough, and inability to lie flat  Pt sleeps with 2 pillows at home   BNP normal 14  Saturating well on RA   CTA PE Study- Negative for PE, mosaic pattern in the lung parenchyma concerning for small vessel/ small airway disease  Per PCP-patient has been prescribed nebulized treatments and inhalers for this intermittent episodes of SOB  Echo - LVEF 60%, normal function  Currently saturating well on RA   SOB improved     Plan:   Monitor oxygenation on room air Albuterol inhaler prn   Imaging reviewed with pulmonology, will require outpatient referral on discharge     Elevated LFTs  Assessment & Plan  Pt presented with mildly elevated LFTs AST 42, ALT 73  This morning AST 46, ALT 73  Follows with GI outpatient who are pursuing workup - DEV elevated but anti-smooth muscle and antimitochondrial antibodies normal. No alcohol in more than 20 years. 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 Quantitative immunoglobulins wnl. Iron panel wnl   Hepatitis panel negative  Celiac paneL/AMA/ anti-smooth muscle antibody/ alpha-1 antitrypsin/ceruloplasmin WNL     Plan:   Anti-histone Antibody pending  Outpatient GI follow up    Adult hypothyroidism  Assessment & Plan  Home regimen Levothyroxine 50mcg QD   Recent symptoms of multiple loose stools over the weekend  TSH normal    Plan:  Continue home regimen    Orthostatic hypotension  Assessment & Plan  Home regimen includes Midodrine 10mg BID   BP this morning 129/83    Plan:  Continue midodrine 10mg daily rather than BID due to some elevated BPs           VTE Pharmacologic Prophylaxis: VTE Score: 3 Moderate Risk (Score 3-4) - Pharmacological DVT Prophylaxis Ordered: enoxaparin (Lovenox). Mobility:   Basic Mobility Inpatient Raw Score: 24  JH-HLM Goal: 8: Walk 250 feet or more  JH-HLM Achieved: 7: Walk 25 feet or more  HLM Goal achieved. Continue to encourage appropriate mobility. Patient Centered Rounds: I performed bedside rounds with nursing staff today. Discussions with Specialists or Other Care Team Provider: GI    Education and Discussions with Family / Patient:  [place] to contact. Current Length of Stay: 1 day(s)  Current Patient Status: Inpatient   Certification Statement: The patient will continue to require additional inpatient hospital stay due to abdominal pain  Discharge Plan: Anticipate discharge in 24-48 hrs to home.     Code Status: Level 1 - Full Code    Subjective:   Patient seen and examined this AM. No acute events overnight. She reports 3 episodes of diarrhea today after CT enterography yesterday. She continues to report R sided abdominal pain. Denies new complaints today. Objective:     Vitals:   Temp (24hrs), Av.9 °F (36.6 °C), Min:97.6 °F (36.4 °C), Max:98.1 °F (36.7 °C)    Temp:  [97.6 °F (36.4 °C)-98.1 °F (36.7 °C)] 97.9 °F (36.6 °C)  HR:  [80-87] 80  Resp:  [16-19] 19  BP: (116-152)/(76-94) 135/76  SpO2:  [92 %-97 %] 92 %  Body mass index is 28.46 kg/m². Input and Output Summary (last 24 hours): Intake/Output Summary (Last 24 hours) at 2023  Last data filed at 2023 1400  Gross per 24 hour   Intake 3918.33 ml   Output --   Net 3918.33 ml       Physical Exam:   Physical Exam  Vitals reviewed. Constitutional:       General: She is not in acute distress. Appearance: Normal appearance. She is not ill-appearing or toxic-appearing. HENT:      Head: Normocephalic. Nose: Nose normal.      Mouth/Throat:      Pharynx: Oropharynx is clear. Cardiovascular:      Rate and Rhythm: Normal rate and regular rhythm. Pulses: Normal pulses. Heart sounds: Normal heart sounds. No murmur heard. No gallop. Pulmonary:      Effort: Pulmonary effort is normal. No respiratory distress. Breath sounds: Rhonchi: bilateral, end expiratory, decreased from yesterday. Abdominal:      General: Bowel sounds are normal. There is no distension. Palpations: Abdomen is soft. Tenderness: There is abdominal tenderness (RLQ). There is no guarding or rebound. Musculoskeletal:         General: Tenderness: over left shin. Normal range of motion. Skin:     General: Skin is warm. Neurological:      Mental Status: She is alert and oriented to person, place, and time. Psychiatric:         Mood and Affect: Mood normal.         Behavior: Behavior normal.         Thought Content:  Thought content normal. Judgment: Judgment normal.       Additional Data:     Labs:  Results from last 7 days   Lab Units 11/16/23  0614 11/15/23  0619 11/13/23  1123   WBC Thousand/uL 4.28*   < > 10.37*   HEMOGLOBIN g/dL 12.3   < > 14.4   HEMATOCRIT % 38.2   < > 45.4   PLATELETS Thousands/uL 247   < > 281   BANDS PCT %  --   --  1   NEUTROS PCT % 61   < >  --    LYMPHS PCT % 18   < >  --    LYMPHO PCT %  --   --  24   MONOS PCT % 15*   < >  --    MONO PCT %  --   --  8   EOS PCT % 5   < > 1    < > = values in this interval not displayed. Results from last 7 days   Lab Units 11/16/23  1142   SODIUM mmol/L 134*   POTASSIUM mmol/L 4.7   CHLORIDE mmol/L 104   CO2 mmol/L 27   BUN mg/dL 16   CREATININE mg/dL 0.81   ANION GAP mmol/L 3   CALCIUM mg/dL 8.8   ALBUMIN g/dL 3.8   TOTAL BILIRUBIN mg/dL 0.57   ALK PHOS U/L 81   ALT U/L 69*   AST U/L 59*   GLUCOSE RANDOM mg/dL 75     Results from last 7 days   Lab Units 11/13/23  1138   INR  0.93             Results from last 7 days   Lab Units 11/16/23  1142 11/13/23  1138 11/13/23  1123   LACTIC ACID mmol/L 1.2 1.3  --    PROCALCITONIN ng/ml  --   --  <0.05       Lines/Drains:  Invasive Devices       Peripheral Intravenous Line  Duration             Peripheral IV 11/16/23 Dorsal (posterior); Left Forearm <1 day                          Imaging: Reviewed radiology reports from this admission including: chest CT scan and abdominal/pelvic CT  CT small bowel enterography    Result Date: 11/15/2023  Impression: *  Inflammation: No evidence of active small bowel Crohn's disease. However, there is a 15 cm long segment of sigmoid colon which shows mild wall thickening, increased inner wall enhancement and mild engorgement of the adjacent vasa recta likely representing mild active Crohn's colitis. 1 *  Stricture: None. *  Penetrating complication: None. *  Perianal disease: None. *  Other complications: None.  Workstation performed: [id number]     US right upper quadrant    Result Date: 11/15/2023  Impression: Moderate hepatic steatosis. Otherwise normal right upper quadrant ultrasound exam. Workstation performed: [id number]     PE Study with CT abdomen & pelvis with contrast    Result Date: 11/13/2023  Impression: 1. No filling defect to suggest acute or chronic pulmonary embolism in the entire pulmonary arterial system. 2.  Suggestion of mosaic lung parenchymal attenuation compatible with mild small vessel/small airways disease. 3.  No acute findings in the abdomen or pelvis. Workstation performed: [id number]       No Chest XR results available for this patient. Recent Cultures (last 7 days):   Results from last 7 days   Lab Units 11/15/23  1703 11/13/23  1123   BLOOD CULTURE   --  No Growth at 48 hrs. No Growth at 48 hrs.    C DIFF TOXIN B BY PCR  Negative  --        Last 24 Hours Medication List:   Current Facility-Administered Medications   Medication Dose Route Frequency Provider Last Rate   • acetaminophen  975 mg [facility] Ashley Boggs MD     • albuterol  2 puff Inhalation Q4H PRN Sabrina Morejon MD     • aspirin  81 mg Oral Daily Erica Vargas MD     • DULoxetine  60 mg Oral Daily Erica Vargas MD     • enoxaparin  40 mg Subcutaneous Daily Erica Vargas MD     • gabapentin  300 mg Oral TID Erica Vargas MD     • levothyroxine  50 mcg Oral Daily Erica Vargas MD     • magnesium Oxide  400 mg Oral Daily Erica Vargas MD     • midodrine  10 mg Oral Daily Erica Vargas MD     • ondansetron  4 mg Oral Q6H PRN Fabrice Dawkins MD     • oxyCODONE  5 mg Oral Q6H PRN Ashley Boggs MD     • oxyCODONE  2.5 mg Oral Q6H PRN Ashley Boggs MD     • pantoprazole  40 mg Intravenous Q24H [facility] Erica Vargas MD     • polyethylene glycol  17 g Oral BID Mando Davidson MD     • predniSONE  40 mg Oral Daily Mando Davidson MD     • QUEtiapine  100 mg Oral HS Erica Vargas MD     • senna-docusate sodium  1 tablet Oral BID Mando Davidson MD     • sodium chloride  100 mL/hr Intravenous Continuous Oren Persaud  mL/hr (11/13/23 2001)   • sucralfate  1 g Oral BID Toño Joshi MD          Today, Patient Was Seen By: Ofelia Kahn DO    **Please Note: This note may have been constructed using a voice recognition system. **

## 2023-11-17 NOTE — DISCHARGE SUMMARY
[address] [facility]  Discharge- Ova Noe 1955, 80 y.o. female MRN: [id number]  Unit/Bed#: W -08 Encounter: [id number]  Primary Care Provider: Leslye Chance MD   Date and time admitted to hospital: 11/13/2023 10:24 AM    * Generalized abdominal pain  Assessment & Plan  Presented with generalized burning abdominal pain, nausea, and loose stools. Diagnosed with Crohn's in May 2023 - treated with Infliximab infusions every 8 weeks. Last infusion 9/29, incomplete dose due to loss of IV access   Reports many loose stools with mucus on Saturday, decreased stools on Sunday - has not had a BM in hospital   She reports common fecal urgency at home   She has decreased appetite and oral hydration  She denies sick contacts or recent travel  CTA PE w/ AP- No intraabdominal process seen, large stool burden   CRP elevated  RUQ US demonstrates moderate hepatic steatosis   CT enterography demonstrates 15 cm long segment of sigmoid colon which shows mild wall thickening, increased inner wall enhancement and mild engorgement of the adjacent vasa recta likely representing mild active Crohn's colitis    Plan:  Stool studies   Fecal Calprotectin pending   Stool Enteric Panel WNL  Ova and Parasite pending   C Diff WNL   GI recommendations noted. Start Prednisone taper which patient will be discharged on until outpatient GI follow up. Bowel regimen     Elevated LFTs  Assessment & Plan  Pt presented with mildly elevated LFTs AST 42, ALT 73  This morning AST 46, ALT 73  Follows with GI outpatient who are pursuing workup - DEV elevated but anti-smooth muscle and antimitochondrial antibodies normal. No alcohol in more than 20 years. 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 Quantitative immunoglobulins wnl.    Iron panel wnl   Hepatitis panel negative  Celiac paneL/AMA/ anti-smooth muscle antibody/ alpha-1 antitrypsin/ceruloplasmin WNL     Plan: Anti-histone Antibody pending  Outpatient GI follow up    Orthostatic hypotension  Assessment & Plan  Home regimen includes Midodrine 10mg BID   BP this morning 129/83    Plan:  Continue midodrine 10mg daily rather than BID due to some elevated BPs    Adult hypothyroidism  Assessment & Plan  Home regimen Levothyroxine 50mcg QD   Recent symptoms of multiple loose stools over the weekend  TSH normal    Plan:  Continue home regimen    SOB (shortness of breath) on exertion  Assessment & Plan  Presented with few day history of dyspnea on exertion, nonproductive cough, and inability to lie flat  Pt sleeps with 2 pillows at home   BNP normal 14  Saturating well on RA   CTA PE Study- Negative for PE, mosaic pattern in the lung parenchyma concerning for small vessel/ small airway disease  Per PCP-patient has been prescribed nebulized treatments and inhalers for this intermittent episodes of SOB  Echo - LVEF 60%, normal function  Currently saturating well on RA   SOB improved     Plan:   Monitor oxygenation on room air   Albuterol inhaler prn   Imaging reviewed with pulmonology, outpatient referral sent for PFTs and workup of asthma/small airway disease      Medical Problems       Resolved Problems  Date Reviewed: 11/17/2023   None       Discharging Resident: Adeline Aparicio DO  Discharging Attending: Sabrina Morejon MD  PCP: Ramin Armijo MD  Admission Date:   Admission Orders (From admission, onward)       Ordered        11/15/23 1432  Inpatient Admission  Once            11/13/23 1533  Place in Observation  Once                          Discharge Date: 11/17/23    Consultations During Hospital Stay:  Gastroenterology    Procedures Performed:   EGD-as noted below. Significant Findings / Test Results:   US right upper quadrant   Final Result by Arla Bamberger, MD (11/15 2048)      Moderate hepatic steatosis.  Otherwise normal right upper quadrant ultrasound exam.      Workstation performed: [id number]         CT small bowel enterography   Final Result by Mariaa Up MD (11/15 0401)   *  Inflammation: No evidence of active small bowel Crohn's disease. However, there is a 15 cm long segment of sigmoid colon which shows mild wall thickening, increased inner wall enhancement and mild engorgement of the adjacent vasa recta likely    representing mild active Crohn's colitis. 1   *  Stricture: None. *  Penetrating complication: None. *  Perianal disease: None. *  Other complications: None. Workstation performed: [id number]         PE Study with CT abdomen & pelvis with contrast   Final Result by Amelia Winston MD (11/13 4329)      1. No filling defect to suggest acute or chronic pulmonary embolism in the entire pulmonary arterial system. 2.  Suggestion of mosaic lung parenchymal attenuation compatible with mild small vessel/small airways disease. 3.  No acute findings in the abdomen or pelvis. Workstation performed: [id number]               Incidental Findings:   None    Test Results Pending at Discharge (will require follow up): Fecal calprotectin and ova parasite stool test-to be followed up with gastroenterology in the outpatient setting. Outpatient Tests Requested:  None    Complications: None    Reason for Admission: Abdominal pain    Hospital Course:   Natalia Castro is a 80 y.o. female patient who originally presented to the hospital on 11/13/2023 due to abdominal pain. In the setting of sigmoid Crohn's flare noted on CT enterography. Patient was followed by GI service. Had extensive work-up with inflammatory markers, antibodies and stool studies. RUQ US demonstrated fatty liver likely the cause for her elevated liver enzymes. CTA ruled out PE for reports of shortness of breath, however, was noted to have mosaic pattern suggestive of possible small vessel disease for which patient referred to outpatient pulmonology at discharge.  Otherwise, patient was started on steroid therapy for Crohn's flare. She is to continue to follow with outpatient gastroenterology and should discuss further management with infliximab infusions as she was already in the outpatient setting. Patient's abdominal pain improved with bowel regimen, monitoring off of antibiotics and started on steroid therapy. On day of discharge, patient evaluated and was cleared for discharge home. Please see above list of diagnoses and related plan for additional information. Condition at Discharge: stable    Discharge Day Visit / Exam:   Subjective: No significant overnight events. Patient reports similar abdominal pain compared to yesterday. No bowel movement since yesterday. She denies any fever, chills, chest pain, shortness of breath, nausea, vomiting. Vitals: Blood Pressure: 118/87 (11/17/23 1208)  Pulse: 101 (11/17/23 1208)  Temperature: 97.7 °F (36.5 °C) (11/17/23 0759)  Temp Source: Oral (11/14/23 2208)  Respirations: 15 (11/17/23 0759)  Height: 5' 5" (165.1 cm) (11/14/23 1000)  Weight - Scale: 77.6 kg (171 lb) (11/14/23 1000)  SpO2: 93 % (11/17/23 1208)  Exam:   Physical Exam  Constitutional:       Appearance: Normal appearance. HENT:      Head: Normocephalic and atraumatic. Mouth/Throat:      Mouth: Mucous membranes are moist.      Pharynx: Oropharynx is clear. Eyes:      Extraocular Movements: Extraocular movements intact. Pupils: Pupils are equal, round, and reactive to light. Cardiovascular:      Rate and Rhythm: Normal rate and regular rhythm. Pulmonary:      Effort: Pulmonary effort is normal.      Breath sounds: Normal breath sounds. Abdominal:      General: Abdomen is flat. Palpations: Abdomen is soft. Comments: Generalized abdominal discomfort, more pronounced on the right lower quadrant. Skin:     General: Skin is warm and dry. Capillary Refill: Capillary refill takes less than 2 seconds. Neurological:      General: No focal deficit present.       Mental Status: She is alert and oriented to person, place, and time. Discussion with Family: Patient declined call to . Discharge instructions/Information to patient and family:   See after visit summary for information provided to patient and family. Provisions for Follow-Up Care:  See after visit summary for information related to follow-up care and any pertinent home health orders. Mobility at time of Discharge:   Basic Mobility Inpatient Raw Score: 24  JH-HLM Goal: 8: Walk 250 feet or more  JH-HLM Achieved: 8: Walk 250 feet ot more  HLM Goal achieved. Continue to encourage appropriate mobility. Disposition:   Home    Planned Readmission: None    Discharge Medications:  See after visit summary for reconciled discharge medications provided to patient and/or family.       **Please Note: This note may have been constructed using a voice recognition system**

## 2023-11-17 NOTE — CASE MANAGEMENT
Case Management Progress Note    Patient name Pankaj Lees  Location W /W -35 MRN [id number]  : 1955 Date 2023       LOS (days): 2  Geometric Mean LOS (GMLOS) (days): 2.60  Days to GMLOS:0.6        OBJECTIVE:        Current admission status: Inpatient  Preferred Pharmacy:   [facility], [address] [place] [address]  Phone: [phone] Fax: [phone]    Primary Care Provider: Abelino Walter MD    Primary Insurance: BLUE CROSS  Secondary Insurance: MEDICARE    PROGRESS NOTE:  CM met with Pt per her request. Pt reported feeling very emotional regarding her d/c to home as she feels her spouse isn't very emotionally supportive and didn't visit her during her hospitalization. Pt denied any SI or HI, physical or emotional abuse and reported feeling safe returning to the home environment. Pt reported she is currently seeing a psychiatrist and will be working on getting a therapist. CM offered the Pt Pastoral support which she agreed to, and CM made Bijan Chapin aware. CM provided the Pt with outpatient therapy services.

## 2023-11-18 LAB
BACTERIA BLD CULT: NORMAL
BACTERIA BLD CULT: NORMAL
CALPROTECTIN STL-MCNT: 210 UG/G (ref 0–120)

## 2023-11-20 ENCOUNTER — OFFICE VISIT (OUTPATIENT)
Dept: NEUROLOGY | Facility: CLINIC | Age: 68
End: 2023-11-20
Payer: COMMERCIAL

## 2023-11-20 ENCOUNTER — TRANSITIONAL CARE MANAGEMENT (OUTPATIENT)
Dept: FAMILY MEDICINE CLINIC | Facility: CLINIC | Age: 68
End: 2023-11-20

## 2023-11-20 VITALS
SYSTOLIC BLOOD PRESSURE: 136 MMHG | BODY MASS INDEX: 27.49 KG/M2 | WEIGHT: 165 LBS | HEIGHT: 65 IN | DIASTOLIC BLOOD PRESSURE: 80 MMHG | HEART RATE: 74 BPM

## 2023-11-20 DIAGNOSIS — G95.9 CERVICAL MYELOPATHY WITH CERVICAL RADICULOPATHY: ICD-10-CM

## 2023-11-20 DIAGNOSIS — M54.12 CERVICAL MYELOPATHY WITH CERVICAL RADICULOPATHY: ICD-10-CM

## 2023-11-20 DIAGNOSIS — I95.1 ORTHOSTATIC HYPOTENSION: ICD-10-CM

## 2023-11-20 DIAGNOSIS — R42 DIZZINESS AND GIDDINESS: ICD-10-CM

## 2023-11-20 DIAGNOSIS — R25.2 MUSCLE CRAMPING: ICD-10-CM

## 2023-11-20 DIAGNOSIS — G62.9 PERIPHERAL NEUROPATHY: Primary | ICD-10-CM

## 2023-11-20 PROBLEM — M48.061 LUMBAR SPINAL STENOSIS: Status: ACTIVE | Noted: 2023-11-20

## 2023-11-20 PROCEDURE — 99214 OFFICE O/P EST MOD 30 MIN: CPT | Performed by: NURSE PRACTITIONER

## 2023-11-20 RX ORDER — INFLIXIMAB 100 MG/10ML
INJECTION, POWDER, LYOPHILIZED, FOR SOLUTION INTRAVENOUS
COMMUNITY

## 2023-11-20 RX ORDER — GABAPENTIN 300 MG/1
300 CAPSULE ORAL 2 TIMES DAILY
Qty: 60 CAPSULE | Refills: 5 | Status: SHIPPED | OUTPATIENT
Start: 2023-11-20

## 2023-11-20 NOTE — TELEPHONE ENCOUNTER
Called patient, reached voicemail, left message to call back to discuss if patient could like to continue with biologic treatment.

## 2023-11-20 NOTE — UTILIZATION REVIEW
NOTIFICATION OF ADMISSION DISCHARGE   This is a Notification of Discharge from [address]. Please be advised that this patient has been discharge from our facility. Below you will find the admission and discharge date and time including the patient’s disposition. UTILIZATION REVIEW CONTACT:  Nica Denis  Utilization   Network Utilization Review Department  Phone: [phone] x carefully listen to the prompts. All voicemails are confidential.  Email: [email or web address]     ADMISSION INFORMATION  PRESENTATION DATE: 11/13/2023 10:24 AM  OBERVATION ADMISSION DATE:   INPATIENT ADMISSION DATE: 11/15/23  2:32 PM   DISCHARGE DATE: 11/17/2023  4:36 PM   DISPOSITION:Home/Self Care    Network Utilization Review Department  ATTENTION: Please call with any questions or concerns to [phone] and carefully listen to the prompts so that you are directed to the right person. All voicemails are confidential.   For Discharge needs, contact Care Management DC Support Team at [phone] opt. 2  Send all requests for admission clinical reviews, approved or denied determinations and any other requests to dedicated fax number below belonging to the campus where the patient is receiving treatment.  List of dedicated fax numbers for the Facilities:  [place] DENIALS (Administrative/Medical Necessity) [phone]   DISCHARGE SUPPORT TEAM (Network) [phone] [address] [facility] (Maternity/NICU/Pediatrics) [phone]   [address] [facility] [address] [phone] [address] [facility] [phone]   [address] [place] [address]  [address]  [phone]

## 2023-11-20 NOTE — PROGRESS NOTES
Patient ID: Natalia Castro is a 80 y.o. female. Assessment/Plan:     Diagnoses and all orders for this visit:    Peripheral neuropathy  -     gabapentin (NEURONTIN) 300 mg capsule; Take 1 capsule (300 mg total) by mouth 2 (two) times a day    Cervical myelopathy with cervical radiculopathy   -     gabapentin (NEURONTIN) 300 mg capsule; Take 1 capsule (300 mg total) by mouth 2 (two) times a day    Orthostatic hypotension    Muscle cramping    Dizziness and giddiness       Continue with cymbalta 60mg daily per PCP  Can decrease gabapentin to 300mg twice a day  Try CoQ10 and see if this counter acts the muscle cramping suspected from medication use  Increase oral hydration  Can use Tonic water for muscle relaxing. Continue with Midodrine 10mg daily per Cardiology  Follow up with Neurology office in 6 months or sooner if needed. Subjective/HPI:  Natalia Castro is a 69yo female who follows with outpatient neurology office for medical management of neuropathy, FM, cervical radiculopathy and dizziness. Dizziness:  Patient has history of orthostatic hypotension, started on midodrine and titrated up to 10 mg 3 times daily. She noted less incidence of dizziness and had a loop recorder placed for cardiac monitoring. Patient has had multiple adjustments to her antihypertensives and continue to follow-up with cardiology. Patient noted intermittent headaches, recommended her taking her blood pressure during the afternoon to see if her antihypertensives need to be adjusted. She had her midodrine eventually adjusted to 10 mg twice a day at that time. Last office appointment patient reported some increased episodes of dizziness, she had diarrheal illness as well felt she was significantly dehydrated. She was diagnosed with Crohn's disease, she was rehydrated and noted no further issues with dizziness. Again taking her midodrine 10 mg twice a day.     Patient reports back to neurology office today, she has minimal episodes of dizziness. She stated her blood pressure has been stable and her cardiologist has decreased her midodrine to 10 mg daily. She has had no appreciable events of dizziness. She also reports that her headaches have improved since her hospitalization. She was recently discharged from the hospital after a week stay related to her Crohn's disease. Patient states that she is on Remicade for this and believes some of her symptoms are related to the initiation of this medication as they have all started after the medication was initiated. However, she reports since being in the hospital, her headaches have seemed to resolved. As in addition to her recent issues with dizziness, she reports having new issues with shortness of breath and pressure in her chest when exerting herself. She this often causes her a lightheaded type sensation where she needs to stop, put her head down and rest for a moment. She states she is usually exerting herself and feels increased pressure into her chest during this time. May be an increase in thoracic pressure, she has an appointment to be seen by pulmonary for further evaluation. Cervical/lumbar radiculopathy  Patient reported ongoing issues with lumbar radiculopathy, she is on gabapentin for this. MRI of the lumbar spine with asymmetric L4-5 foraminal stenosis  Patient had gone through anterior fusion at C3-5 and had persistent left C4-5 6 foraminal stenosis indicating possible radiculitis for which she saw neurosurgery. Patient had then had psychiatric hospital admission, her medications were adjusted and her gabapentin was decreased to 300 mg 3 times daily, Cymbalta 60 mg daily with magnesium and Seroquel 100 mg at bedtime. Patient felt her radiculopathy at last office appointment was continued on her gabapentin dosage. Patient saw neurosurgery through [address] and they ordered an EMG to rule out ulnar and median neuropathies.   Patient had not followed up with the EMG as of her last office appointment however outcome readings indicated radicular findings from the right C6-T1 and left C8-T1. Last office appointment patient was doing well on her medication regimen with the gabapentin and Cymbalta. She was having contractures and cramping in the bilateral hands, it had been intermittent and her cardiologist recommended quinine in tonic water. Encouraged her to increase her hydration. Patient indicates that she is doing well on the Cymbalta and the gabapentin. She reports that she has only been taking gabapentin twice a day rather than 3 times a day and her pain has been fairly well controlled. She feels as though the Cymbalta is doing well for her as well. Patient does report having ongoing issues with muscle spasming. At 1 point she was getting significant upper to middle back spasming along the paraspinals for which she took one of her husbands baclofen. She stated the baclofen did assist in relaxing the muscle tension. She did not continue to take the medication as it was able to alleviate at the initial time. Patient reports however up until her hospitalization she was getting frequent hand, arm, leg and back cramping. She reports that increased in frequency and was very painful. She states since being in the hospital she has not had any issues with muscle cramping. We reviewed her medication list and treatments during the hospital stay. Patient had received IV fluids for hydration which may be the key for her. Patient does drink quinine, she does like tonic water and drinks it when she is having muscle cramping however, she denies adequate oral hydration with water during the day. She has a cup of water at her workplace however enough. Encourage patient to increase her oral water hydration. Patient also thinks this may be related to the Remicade for her Crohn's.   These muscle cramping started after initiating this therapy and she believes they may be linked. According to her doctor, this was not a common side effect for Remicade. Patient has already been taken off of her atorvastatin. Patient is already taking magnesium 400 mg twice a day, tonic water as well. Encouraged her to increase her oral hydration and can trial co-Q10 over-the-counter for repletion and see if this helps to alleviate her muscle pain and spasming. Neuropathy:  EMG of the lower extremities in March 2020 showed left-sided superficial peroneal neuropathy otherwise was normal.  She continued on Cymbalta and gabapentin. Pt reports good relief of her neuropathy with the gabapentin and Cymbalta. Patient states she is only taking 300 mg twice a day and is continues to hold her neuropathy at [facility]. She has to take it twice daily ongoing. Prescription adjusted patient will continue 300 mg twice a day. Encourage patient not. Medication in the future.       The following portions of the patient's history were reviewed and updated as appropriate: allergies, current medications, past family history, past medical history, past social history, past surgical history, and problem list.      Past Medical History:   Diagnosis Date    Abdominal adhesions     Anesthesia complication     difficult to wake up    Anxiety     Arthritis     Cancer ([address])     melanoma    Colon polyp     Crohn's disease ([address])     Depression     Depression     Disease of thyroid gland     Fibromyalgia     Fibromyalgia, primary     Fractures 2006    GERD (gastroesophageal reflux disease)     History of cholecystectomy 09/07/2019    Hyperlipidemia     Irregular heart beat     can be tachy; also has orthoststic hypotension    Lazy eye     resolved: 3/27/17    Medical clearance for psychiatric admission 07/13/2021    Melanoma ([address]) 2010    Osteoarthritis 07/2018    Osteopenia     with joint pain-elevated DEV    Psychiatric disorder     Shortness of breath     assoc with tachycardia    Stomach disorder 1995 Tinnitus     Wears glasses     for reading       Past Surgical History:   Procedure Laterality Date    ABDOMINAL SURGERY      lysis of adhesions x 2    APPENDECTOMY      CERVICAL FUSION      May 5,2021 and Jan. 01,2020    CHOLECYSTECTOMY      open    COLONOSCOPY      DILATION AND CURETTAGE OF UTERUS      FOOT SURGERY  05/2017    NECK SURGERY  01/2019 5/2021    NEUROMA EXCISION Right 05/26/2017    Procedure: EXCISION MASS / FIBROMA FOOT;  Surgeon: Winston Chapin DPM;  Location: [facility];  Service:     PARS PLANA VITRECTOMY W/ REPAIR OF MACULAR HOLE  12/23/2020    ME XCAPSL CTRC RMVL INSJ IO LENS PROSTH W/O ECP Left 12/06/2021    Procedure: EXTRACTION EXTRACAPSULAR CATARACT PHACO INTRAOCULAR LENS (IOL); Surgeon: Hardy Sexton MD;  Location: [facility] MAIN OR;  Service: Ophthalmology    RIGHT OOPHORECTOMY      WISDOM TOOTH EXTRACTION      x4       Social History     Socioeconomic History    Marital status: /Civil Union     Spouse name: None    Number of children: None    Years of education: None    Highest education level: None   Occupational History    None   Tobacco Use    Smoking status: Never    Smokeless tobacco: Never   Vaping Use    Vaping Use: Never used   Substance and Sexual Activity    Alcohol use: Not Currently    Drug use: No    Sexual activity: Not Currently     Partners: Male   Other Topics Concern    None   Social History Narrative    None     Social Determinants of Health     Financial Resource Strain: Not on file   Food Insecurity: No Food Insecurity (11/14/2023)    Hunger Vital Sign     Worried About Running Out of Food in the Last Year: Never true     Ran Out of Food in the Last Year: Never true   Transportation Needs: No Transportation Needs (11/14/2023)    PRAPARE - Transportation     Lack of Transportation (Medical): No     Lack of Transportation (Non-Medical):  No   Physical Activity: Not on file   Stress: Not on file   Social Connections: Not on file   Intimate Partner Violence: Not on file   Housing Stability: Low Risk  (11/14/2023)    Housing Stability Vital Sign     Unable to Pay for Housing in the Last Year: No     Number of Places Lived in the Last Year: 1     Unstable Housing in the Last Year: No       Family History   Problem Relation Age of Onset    Heart disease Mother     Stroke Mother 58    COPD Mother     Arthritis Mother     Hypertension Father     Dislocations Sister     Multiple sclerosis Sister     Neurological problems Sister     Scoliosis Sister     No Known Problems Maternal Grandmother     No Known Problems Maternal Grandfather     No Known Problems Paternal Grandmother     No Known Problems Paternal Grandfather     Kidney disease Brother         kidney transplant    No Known Problems Maternal Aunt     No Known Problems Maternal Uncle     No Known Problems Paternal Aunt     No Known Problems Paternal Uncle     ADD / ADHD Neg Hx     Anesthesia problems Neg Hx     Cancer Neg Hx     Clotting disorder Neg Hx     Collagen disease Neg Hx     Diabetes Neg Hx     Dislocations Neg Hx     Learning disabilities Neg Hx     Neurological problems Neg Hx     Osteoporosis Neg Hx     Rheumatologic disease Neg Hx     Scoliosis Neg Hx     Vascular Disease Neg Hx          Current Outpatient Medications:     acetaminophen (TYLENOL) 650 mg CR tablet, Take 1 tablet (650 mg total) by mouth every 8 (eight) hours as needed for headaches, moderate pain or mild pain, Disp: , Rfl: 0    albuterol (ProAir HFA) 90 mcg/act inhaler, Inhale 2 puffs every 6 (six) hours as needed for wheezing, Disp: 8.5 g, Rfl: 0    budesonide (Pulmicort Flexhaler) 90 MCG/ACT inhaler, Inhale 1 puff 2 (two) times a day, Disp: 1 each, Rfl: 5    Calcium Carb-Cholecalciferol 600-12.5 MG-MCG CAPS, Take by mouth daily in the early morning, Disp: , Rfl:     [employer] Aspirin Adult Low Strength 81 MG EC tablet, , Disp: , Rfl:     DULoxetine (CYMBALTA) 60 mg delayed release capsule, Take 1 capsule (60 mg total) by mouth daily, Disp: 90 capsule, Rfl: 1    esomeprazole (NexIUM) 40 MG capsule, Take 1 capsule (40 mg total) by mouth daily before breakfast, Disp: 90 capsule, Rfl: 5    gabapentin (NEURONTIN) 300 mg capsule, Take 1 capsule (300 mg total) by mouth 2 (two) times a day, Disp: 60 capsule, Rfl: 5    inFLIXimab (REMICADE) 100 mg, Inject into a catheter in a vein, Disp: , Rfl:     levothyroxine 50 mcg tablet, TAKE 1 TABLET BY MOUTH EVERY DAY, Disp: 90 tablet, Rfl: 1    Magnesium 400 MG TABS, Take 800 mg by mouth daily, Disp: , Rfl:     midodrine (PROAMATINE) 10 MG tablet, Take 1 tablet (10 mg total) by mouth in the morning Do not start before November 18, 2023., Disp: 30 tablet, Rfl: 0    predniSONE 10 mg tablet, Take 4 tablets (40 mg total) by mouth daily for 7 days, THEN 3.5 tablets (35 mg total) daily for 7 days, THEN 3 tablets (30 mg total) daily for 7 days, THEN 2.5 tablets (25 mg total) daily for 7 days, THEN 2 tablets (20 mg total) daily for 7 days. , Disp: 105 tablet, Rfl: 0    QUEtiapine (SEROquel) 100 mg tablet, Take 1 tablet (100 mg total) by mouth daily at bedtime, Disp: 90 tablet, Rfl: 1    Sodium Fluoride 5000 PPM 1.1 % PSTE, USE ONCE DAILY PREFERABLY AT NIGHT, Disp: , Rfl:     sucralfate (CARAFATE) 1 g tablet, Take 1 tablet (1 g total) by mouth 2 (two) times a day, Disp: 60 tablet, Rfl: 0    calcium carbonate (OS-WILLY) 1250 (500 Ca) MG tablet, Take 1,250 mg by mouth (Patient not taking: Reported on 11/20/2023), Disp: , Rfl:     Allergies   Allergen Reactions    Meperidine Lightheadedness and Other (See Comments)    Sulfa Antibiotics Hives        Blood pressure 136/80, pulse 74, height 5' 5" (1.651 m), weight 74.8 kg (165 lb). Objective:    Blood pressure 136/80, pulse 74, height 5' 5" (1.651 m), weight 74.8 kg (165 lb). Physical Exam  Vitals reviewed. Constitutional:       Appearance: Normal appearance. She is well-developed. HENT:      Head: Normocephalic.       Nose: Nose normal.      Mouth/Throat:      Mouth: Mucous membranes are moist.   Eyes:      General: Lids are normal.      Extraocular Movements: Extraocular movements intact. Pupils: Pupils are equal, round, and reactive to light. Cardiovascular:      Rate and Rhythm: Normal rate. Pulmonary:      Effort: Pulmonary effort is normal.   Abdominal:      Palpations: Abdomen is soft. Musculoskeletal:      Cervical back: Normal range of motion. Skin:     General: Skin is warm and dry. Findings: Ecchymosis present. Comments: Recent hospital stay with multiple lab and IV stick bruising   Neurological:      Mental Status: She is alert. Motor: Motor strength is normal.     Coordination: Romberg sign negative. Deep Tendon Reflexes:      Reflex Scores:       Patellar reflexes are 3+ on the right side and 3+ on the left side. Psychiatric:         Attention and Perception: Attention and perception normal.         Mood and Affect: Mood and affect normal.         Speech: Speech normal.         Behavior: Behavior normal. Behavior is cooperative. Thought Content: Thought content normal.         Cognition and Memory: Cognition and memory normal.         Judgment: Judgment normal.         Neurological Exam  Mental Status  Alert. Oriented to person, place, time and situation. Memory is normal. Recent and remote memory are intact. Speech is normal. Language is fluent with no aphasia. Attention and concentration are normal. Fund of knowledge is appropriate for level of education. Cranial Nerves  CN II: Visual acuity is normal. Visual fields full to confrontation. CN III, IV, VI: Extraocular movements intact bilaterally. Normal lids and orbits bilaterally. Pupils equal round and reactive to light bilaterally. CN V: Facial sensation is normal.  CN VII: Full and symmetric facial movement. CN VIII: Hearing is normal.  CN IX, X: Palate elevates symmetrically. Normal gag reflex.   CN XI: Shoulder shrug strength is normal.  CN XII: Tongue midline without atrophy or fasciculations. Motor  Normal muscle bulk throughout. Normal muscle tone. No abnormal involuntary movements. Strength is 5/5 throughout all four extremities. Sensory  Light touch is normal in upper and lower extremities. Temperature is normal in upper and lower extremities. Vibration is normal in upper and lower extremities. Proprioception is normal in upper and lower extremities. Reflexes  Deep tendon reflexes are 2+ and symmetric except as noted. Right                      Left  Patellar                                3+                         3+    Right pathological reflexes: Malik's absent. Left pathological reflexes: Malik's absent. Coordination  Right: Finger-to-nose normal. Rapid alternating movement normal. Heel-to-shin normal.Left: Finger-to-nose normal. Rapid alternating movement normal. Heel-to-shin normal.    Gait  Casual gait is normal including stance, stride, and arm swing. Normal toe walking. Normal heel walking. Normal tandem gait. Romberg is absent. Able to rise from chair without using arms. ROS:    Review of Systems   Constitutional:  Positive for appetite change (decreased). Negative for fatigue and fever. HENT:  Positive for tinnitus, trouble swallowing and voice change. Negative for hearing loss. Eyes: Negative. Negative for photophobia, pain and visual disturbance. Respiratory:  Positive for shortness of breath. Cardiovascular: Negative. Negative for palpitations. Gastrointestinal:  Positive for nausea and vomiting. Endocrine: Negative. Negative for cold intolerance. Genitourinary: Negative. Negative for dysuria, frequency and urgency. Musculoskeletal:  Positive for back pain (spasms) and neck pain. Negative for gait problem and myalgias. Skin: Negative. Negative for rash. Allergic/Immunologic: Negative. Neurological:  Positive for facial asymmetry (tingling left side). Negative for dizziness, tremors, seizures, syncope, speech difficulty, weakness, light-headedness, numbness and headaches. Hematological:  Bruises/bleeds easily. Psychiatric/Behavioral: Negative. Negative for confusion, hallucinations and sleep disturbance.

## 2023-11-20 NOTE — PATIENT INSTRUCTIONS
Continue with cymbalta 60mg daily per PCP  Can decrease gabapentin to 300mg twice a day  Try CoQ10 and see if this counter acts the muscle cramping suspected from medication use  Increase oral hydration  Can use Tonic water for muscle relaxing. Continue with Midodrine 10mg daily per Cardiology  Follow up with Neurology office in 6 months or sooner if needed.

## 2023-11-21 NOTE — TELEPHONE ENCOUNTER
Pt. Returned call, would like to speak with Dr. Aden So re: Remicade at appointment on 12/11/23, adverse side effects and additional treatment options, pt.  Seen by dietician and is on a low residue, low fiber diet which has been helping, overall feeling tired but otherwise offering no complaints

## 2023-11-24 NOTE — TELEPHONE ENCOUNTER
Gerre Older from [facility] called to confirm date of pts OV. Date confirmed.       Direct call back number for yoshi: [phone]  Office number: [phone]

## 2023-11-28 ENCOUNTER — OFFICE VISIT (OUTPATIENT)
Dept: PSYCHIATRY | Facility: CLINIC | Age: 68
End: 2023-11-28
Payer: COMMERCIAL

## 2023-11-28 VITALS
BODY MASS INDEX: 28.16 KG/M2 | HEIGHT: 65 IN | SYSTOLIC BLOOD PRESSURE: 131 MMHG | WEIGHT: 169 LBS | HEART RATE: 94 BPM | DIASTOLIC BLOOD PRESSURE: 86 MMHG

## 2023-11-28 DIAGNOSIS — F33.2 SEVERE EPISODE OF RECURRENT MAJOR DEPRESSIVE DISORDER, WITHOUT PSYCHOTIC FEATURES (HCC): ICD-10-CM

## 2023-11-28 DIAGNOSIS — F63.0 GAMBLING DISORDER, MODERATE: ICD-10-CM

## 2023-11-28 DIAGNOSIS — F43.10 PTSD (POST-TRAUMATIC STRESS DISORDER): Chronic | ICD-10-CM

## 2023-11-28 DIAGNOSIS — F41.9 ANXIETY AND DEPRESSION: Primary | ICD-10-CM

## 2023-11-28 DIAGNOSIS — F41.1 GENERALIZED ANXIETY DISORDER: ICD-10-CM

## 2023-11-28 DIAGNOSIS — F32.A ANXIETY AND DEPRESSION: Primary | ICD-10-CM

## 2023-11-28 DIAGNOSIS — F33.41 RECURRENT MAJOR DEPRESSIVE DISORDER, IN PARTIAL REMISSION (HCC): ICD-10-CM

## 2023-11-28 DIAGNOSIS — F51.05 INSOMNIA RELATED TO ANOTHER MENTAL DISORDER: ICD-10-CM

## 2023-11-28 DIAGNOSIS — F51.01 PRIMARY INSOMNIA: ICD-10-CM

## 2023-11-28 PROCEDURE — 99214 OFFICE O/P EST MOD 30 MIN: CPT | Performed by: NURSE PRACTITIONER

## 2023-11-28 PROCEDURE — 90833 PSYTX W PT W E/M 30 MIN: CPT | Performed by: NURSE PRACTITIONER

## 2023-11-30 ENCOUNTER — TELEPHONE (OUTPATIENT)
Age: 68
End: 2023-11-30

## 2023-11-30 NOTE — TELEPHONE ENCOUNTER
Colette, from [facility] called to let Dr. Yaima Richard know that Bebe Cormier is involved with a program with them called case management. If there is anything she would like her to work on with Bebe Cormier, Dr. Yaima Richard can reach out to Colette at [phone].

## 2023-12-01 ENCOUNTER — OFFICE VISIT (OUTPATIENT)
Dept: FAMILY MEDICINE CLINIC | Facility: CLINIC | Age: 68
End: 2023-12-01
Payer: COMMERCIAL

## 2023-12-01 VITALS
WEIGHT: 170 LBS | HEIGHT: 65 IN | HEART RATE: 94 BPM | DIASTOLIC BLOOD PRESSURE: 80 MMHG | OXYGEN SATURATION: 95 % | BODY MASS INDEX: 28.32 KG/M2 | TEMPERATURE: 97.4 F | SYSTOLIC BLOOD PRESSURE: 140 MMHG | RESPIRATION RATE: 20 BRPM

## 2023-12-01 DIAGNOSIS — R79.89 ELEVATED LFTS: ICD-10-CM

## 2023-12-01 DIAGNOSIS — I95.1 ORTHOSTATIC HYPOTENSION: ICD-10-CM

## 2023-12-01 DIAGNOSIS — K50.80 CROHN'S DISEASE OF BOTH SMALL AND LARGE INTESTINE WITHOUT COMPLICATION (HCC): ICD-10-CM

## 2023-12-01 DIAGNOSIS — R55 SYNCOPE, UNSPECIFIED SYNCOPE TYPE: ICD-10-CM

## 2023-12-01 DIAGNOSIS — R10.84 GENERALIZED ABDOMINAL PAIN: Primary | ICD-10-CM

## 2023-12-01 DIAGNOSIS — E03.9 ADULT HYPOTHYROIDISM: ICD-10-CM

## 2023-12-01 PROCEDURE — 99495 TRANSJ CARE MGMT MOD F2F 14D: CPT | Performed by: NURSE PRACTITIONER

## 2023-12-01 NOTE — PROGRESS NOTES
Assessment/Plan:    1. Generalized abdominal pain  Comments:  resolved and still on prednisone as ordered    2. Crohn's disease of both small and large intestine without complication ([address])  Comments:  managed by gastro and will be following with gastro on 12/11    3. Elevated LFTs  Assessment & Plan: Will follow with GI      4. Orthostatic hypotension  Assessment & Plan: On midodrine      5. Adult hypothyroidism  Assessment & Plan:  Complaint with levothyroxine and tolerating it well      6. Syncope, unspecified syncope type  Comments:  will go to ER for any issues and will follow with cardiologist and stay hydrated        Patient Instructions:  Supportive care discussed and advised. Advised to RTO for any worsening and no improvement. Follow up for no improvement and worsening of conditions. Patient advised and educated when to see immediate medical care. Return if symptoms worsen or fail to improve. Future Appointments   Date Time Provider [address]   12/11/2023 11:20 AM Fabrizio Alexander MD GI TR [facility]   2/27/2024  5:00 PM Cara Rojo, PhD [place] [id number] [facility]   5/20/2024  4:00 PM SHRAVAN Welch [facility]           Subjective:      Patient ID: Kalpana Wolf is a 80 y.o. female. Chief Complaint   Patient presents with   • Transition of Care Management     [id number]   • Dizziness     Fell last night           Vitals:  /80   Pulse 94   Temp (!) 97.4 °F (36.3 °C)   Resp 20   Ht 5' 5" (1.651 m)   Wt 77.1 kg (170 lb)   LMP  (LMP Unknown)   SpO2 95%   BMI 28.29 kg/m²     HPI  Patient is here to follow up after recent hospitalization. Stated that taking prednisone and tolerating it well and her GI symptoms has resolved. Patient stated that yesterday felt dizzy an passe out at home and  used to happen her more often but now did not happened from last 6 months.  Patient has loop recorder and advised to follow with cardiologist and will go to ER for any episode and will monitor BP at home and will follow back for any concerns. Hurt her right hip and denies any movement issues and will take tylenol as needed for any concerns    TCM Call     Date and time call was made  11/20/2023  2:16 PM    Hospital care reviewed  Records reviewed    Patient was hospitialized at  [address] [place] [address] Rd    Date of Admission  11/13/23    Date of discharge  11/17/23    Diagnosis  abdominal pain    Disposition  Home    Were the patients medications reviewed and updated  Yes    Current Symptoms  None  Today she has no complaints but yesterday, 11/20/23 she had stomach pains after eating lasagna noodles    Dizziness severity  Moderate    Upper abdominal pain severity  Severe    Upper abdominal pain onset  Ongoing      TCM Call     Post hospital issues  None    Should patient be enrolled in anticoag monitoring? No    Scheduled for follow up? Yes    Patient refusal reason  She did not call back and came in to be seen    Patients specialists  Pulmonlolgist; Other (comment)    Cardiologist name  Dr Taylor Cortes    Other specialists names  GI    Did you obtain your prescribed medications  Yes    Do you need help managing your prescriptions or medications  No    Is transportation to your appointment needed  No    I have advised the patient to call PCP with any new or worsening symptoms  [facility] or [facility] other    Support System  Family    The type of support provided  Physical; Emotional    Do you have social support  Yes, as much as I need    Are you recieving any outpatient services  No    What type of services  [facility]    Are you recieving home care services  No    Are you using any community resources  No    Current waiver services  No    Have you fallen in the last 12 months  Yes    How many times  3-4 times    Interperter language line needed  No    Counseling  Patient    Counseling topics  Activities of daily living;  Importance of RX compliance; patient and family education; instructions for management; Risk factor reduction    Comments  Kendrick Rojas knows to call if any questions. She offers no complaints today [email or web address]                The following portions of the patient's history were reviewed and updated as appropriate: allergies, current medications, past family history, past medical history, past social history, past surgical history and problem list.      Review of Systems   Constitutional:  Negative for appetite change, chills, fever and unexpected weight change. Respiratory: Negative. Cardiovascular: Negative. Gastrointestinal:  Negative for abdominal pain, anal bleeding, blood in stool, constipation, diarrhea, nausea, rectal pain and vomiting. Musculoskeletal:  Positive for arthralgias. Skin: Negative. Neurological:         As noted in HPI             Objective:    Social History     Tobacco Use   Smoking Status Never   Smokeless Tobacco Never       Allergies:    Allergies   Allergen Reactions   • Meperidine Lightheadedness and Other (See Comments)   • Sulfa Antibiotics Hives         Current Outpatient Medications   Medication Sig Dispense Refill   • acetaminophen (TYLENOL) 650 mg CR tablet Take 1 tablet (650 mg total) by mouth every 8 (eight) hours as needed for headaches, moderate pain or mild pain  0   • albuterol (ProAir HFA) 90 mcg/act inhaler Inhale 2 puffs every 6 (six) hours as needed for wheezing 8.5 g 0   • budesonide (Pulmicort Flexhaler) 90 MCG/ACT inhaler Inhale 1 puff 2 (two) times a day 1 each 5   • Calcium Carb-Cholecalciferol 600-12.5 MG-MCG CAPS Take by mouth daily in the early morning     • [facility] Aspirin Adult Low Strength 81 MG EC tablet daily     • DULoxetine (CYMBALTA) 60 mg delayed release capsule Take 1 capsule (60 mg total) by mouth daily 90 capsule 1   • esomeprazole (NexIUM) 40 MG capsule Take 1 capsule (40 mg total) by mouth daily before breakfast 90 capsule 5   • gabapentin (NEURONTIN) 300 mg capsule Take 1 capsule (300 mg total) by mouth 2 (two) times a day 60 capsule 5   • inFLIXimab (REMICADE) 100 mg Inject into a catheter in a vein     • levothyroxine 50 mcg tablet TAKE 1 TABLET BY MOUTH EVERY DAY 90 tablet 1   • Magnesium 400 MG TABS Take 800 mg by mouth daily     • midodrine (PROAMATINE) 10 MG tablet Take 1 tablet (10 mg total) by mouth in the morning Do not start before November 18, 2023. 30 tablet 0   • predniSONE 10 mg tablet Take 4 tablets (40 mg total) by mouth daily for 7 days, THEN 3.5 tablets (35 mg total) daily for 7 days, THEN 3 tablets (30 mg total) daily for 7 days, THEN 2.5 tablets (25 mg total) daily for 7 days, THEN 2 tablets (20 mg total) daily for 7 days. 105 tablet 0   • QUEtiapine (SEROquel) 100 mg tablet Take 1 tablet (100 mg total) by mouth daily at bedtime 90 tablet 1   • Sodium Fluoride 5000 PPM 1.1 % PSTE USE ONCE DAILY PREFERABLY AT NIGHT     • calcium carbonate (OS-WILLY) 1250 (500 Ca) MG tablet Take 1,250 mg by mouth (Patient not taking: Reported on 11/21/2023)     • sucralfate (CARAFATE) 1 g tablet Take 1 tablet (1 g total) by mouth 2 (two) times a day (Patient not taking: Reported on 11/28/2023) 60 tablet 0     No current facility-administered medications for this visit. Physical Exam  Vitals reviewed. Constitutional:       Appearance: She is well-developed. Cardiovascular:      Rate and Rhythm: Normal rate and regular rhythm. Heart sounds: Normal heart sounds. Pulmonary:      Effort: Pulmonary effort is normal.      Breath sounds: Normal breath sounds. Abdominal:      General: Bowel sounds are normal.      Palpations: Abdomen is soft. Tenderness: There is no abdominal tenderness. Musculoskeletal:      Right hip: Tenderness (mildly tender on right hip area) present. Normal range of motion. Skin:     General: Skin is warm and dry. Neurological:      General: No focal deficit present.       Mental Status: She is alert and oriented to person, place, and time. GCS: GCS eye subscore is 4. GCS verbal subscore is 5. GCS motor subscore is 6. Cranial Nerves: No facial asymmetry. Motor: No weakness. Coordination: Coordination normal.      Gait: Gait normal.   Psychiatric:         Behavior: Behavior normal.         Thought Content:  Thought content normal.         Judgment: Judgment normal.                     SHRAVAN Thomas

## 2023-12-01 NOTE — PATIENT INSTRUCTIONS
Check your BP and if below 100/60 or above 140/90 consistently then will follow back  Supportive care discussed and advised. Advised to RTO for any worsening and no improvement. Follow up for no improvement and worsening of conditions. Patient advised and educated when to see immediate medical care.

## 2023-12-03 NOTE — PSYCH
Visit Time    Visit Start Time: 6:00  Visit Stop Time: 6:30  Total Visit Duration:  30 minutes    Subjective:     Patient ID: Porfirio Bishop is a 80 y.o. female. History of anxiety, depression, PTSD, and insomnia and gambling disorder seen for medication management and mood assessment. Dimas Garcia reports work is less stressful. Appetite and sleep are good medications help with moods. She was in the hospital for a week due to Crohn's and was disappointment that her  did not visit until discharge. They communicate to each other and the relationship is better. She has her 22th wedding anniversary and will be going away with her . Denies gambling, denies suicidal ideation. ELLY-7 score of 2 indicates minimal anxiety, PHQ-9 score of 2 indicates no or minimal depression.     HPI ROS Appetite Changes and Sleep: normal appetite and normal energy level    Review Of Systems:     Mood Anxiety and Depression improved   Behavior Normal    Thought Content Normal   General Emotional Problems and Sleep Disturbances   Personality Normal   Other Psych Symptoms Normal   Constitutional As Noted in HPI   ENT As Noted in HPI   Cardiovascular As Noted in HPI   Respiratory As Noted in HPI   Gastrointestinal As Noted in HPI   Genitourinary As Noted in HPI   Musculoskeletal As Noted in HPI   Integumentary As Noted in HPI   Neurological As Noted in HPI   Endocrine Hypothyroid   Other Symptoms Normal        Laboratory Results:     Substance Abuse History:  Social History     Substance and Sexual Activity   Drug Use No       Family Psychiatric History:   Family History   Problem Relation Age of Onset    Heart disease Mother     Stroke Mother 58    COPD Mother     Arthritis Mother     Hypertension Father     Dislocations Sister     Multiple sclerosis Sister     Neurological problems Sister     Scoliosis Sister     No Known Problems Maternal Grandmother     No Known Problems Maternal Grandfather     No Known Problems Paternal Grandmother     No Known Problems Paternal Grandfather     Kidney disease Brother         kidney transplant    No Known Problems Maternal Aunt     No Known Problems Maternal Uncle     No Known Problems Paternal Aunt     No Known Problems Paternal Uncle     ADD / ADHD Neg Hx     Anesthesia problems Neg Hx     Cancer Neg Hx     Clotting disorder Neg Hx     Collagen disease Neg Hx     Diabetes Neg Hx     Dislocations Neg Hx     Learning disabilities Neg Hx     Neurological problems Neg Hx     Osteoporosis Neg Hx     Rheumatologic disease Neg Hx     Scoliosis Neg Hx     Vascular Disease Neg Hx        The following portions of the patient's history were reviewed and updated as appropriate: allergies, current medications, past family history, past medical history, past social history, past surgical history, and problem list.    Social History     Socioeconomic History    Marital status: /Civil Union     Spouse name: Not on file    Number of children: Not on file    Years of education: Not on file    Highest education level: Not on file   Occupational History    Not on file   Tobacco Use    Smoking status: Never    Smokeless tobacco: Never   Vaping Use    Vaping Use: Never used   Substance and Sexual Activity    Alcohol use: Not Currently    Drug use: No    Sexual activity: Not Currently     Partners: Male   Other Topics Concern    Not on file   Social History Narrative    Not on file     Social Determinants of Health     Financial Resource Strain: Not on file   Food Insecurity: No Food Insecurity (11/14/2023)    Hunger Vital Sign     Worried About Running Out of Food in the Last Year: Never true     Ran Out of Food in the Last Year: Never true   Transportation Needs: No Transportation Needs (11/14/2023)    PRAPARE - Transportation     Lack of Transportation (Medical): No     Lack of Transportation (Non-Medical):  No   Physical Activity: Not on file   Stress: Not on file   Social Connections: Not on file   Intimate Partner Violence: Not on file   Housing Stability: Low Risk  (11/14/2023)    Housing Stability Vital Sign     Unable to Pay for Housing in the Last Year: No     Number of Places Lived in the Last Year: 1     Unstable Housing in the Last Year: No     Social History     Social History Narrative    Not on file       Objective:       Mental status:  Appearance calm and cooperative , adequate hygiene and grooming, and good eye contact    Mood anxious   Affect affect appropriate    Speech a normal rate   Thought Processes normal thought processes   Hallucinations no hallucinations present    Thought Content no delusions   Abnormal Thoughts no suicidal thoughts  and no homicidal thoughts    Orientation  oriented to person and place and time   Remote Memory short term memory intact and long term memory intact   Attention Span concentration intact   Intellect Appears to be of Average Intelligence   Insight Insight intact   Judgement judgment was intact   Muscle Strength Muscle strength and tone were normal   Language no difficulty naming common objects   Fund of Knowledge displays adequate knowledge of current events   Pain moderate to severe   Pain Scale 4       Assessment/Plan:       Diagnoses and all orders for this visit:    Anxiety and depression    Recurrent major depressive disorder, in partial remission ([address])    Gambling disorder, moderate    Generalized anxiety disorder    Insomnia related to another mental disorder    PTSD (post-traumatic stress disorder)    Severe episode of recurrent major depressive disorder, without psychotic features ([address])    Primary insomnia            Treatment Recommendations- Risks Benefits      Immediate Medical/Psychiatric/Psychotherapy Treatments and Any Precautions: Continue with treatment plan    Risks, Benefits And Possible Side Effects Of Medications:  {PSYCH RISK, BENEFITS AND POSSIBLE SIDE EFFECTS (Optional):39141     Psychotherapy Provided: 30 minutes individual psychotherapy provided.    Supportive therapy  Medication evaluation  Mood assessment  Survey results were reviewed and verified    Goals discussed in session: Maintain stable mood

## 2023-12-05 ENCOUNTER — OFFICE VISIT (OUTPATIENT)
Dept: URGENT CARE | Facility: CLINIC | Age: 68
End: 2023-12-05
Payer: COMMERCIAL

## 2023-12-05 ENCOUNTER — NURSE TRIAGE (OUTPATIENT)
Age: 68
End: 2023-12-05

## 2023-12-05 VITALS
TEMPERATURE: 98.6 F | BODY MASS INDEX: 29.16 KG/M2 | WEIGHT: 175 LBS | HEART RATE: 96 BPM | OXYGEN SATURATION: 98 % | RESPIRATION RATE: 18 BRPM | HEIGHT: 65 IN

## 2023-12-05 DIAGNOSIS — B02.9 HERPES ZOSTER WITHOUT COMPLICATION: Primary | ICD-10-CM

## 2023-12-05 PROCEDURE — 99213 OFFICE O/P EST LOW 20 MIN: CPT | Performed by: PHYSICIAN ASSISTANT

## 2023-12-05 RX ORDER — VALACYCLOVIR HYDROCHLORIDE 1 G/1
1000 TABLET, FILM COATED ORAL 3 TIMES DAILY
Qty: 21 TABLET | Refills: 0 | Status: SHIPPED | OUTPATIENT
Start: 2023-12-05 | End: 2023-12-12

## 2023-12-05 NOTE — TELEPHONE ENCOUNTER
Call placed to patient for complaints of possible shingles rash. Patient states today she developed severe pain in left upper thigh with blister rash. Denies fever,chills, nausea vomiting or rash near head, face, eyes, or neck. Patient is on steroids for flare of her Crohn's disease. I recommended evaluation this evening at [facility] . Patient agreed and will head to  after work.

## 2023-12-05 NOTE — TELEPHONE ENCOUNTER
Regarding: poss shingles  ----- Message from Ricky Butterfield sent at 12/5/2023  2:55 PM EST -----  Patient called in with poss shingles rash that is painful. Offered appt for today patient declined due to work.  Patient scheduled for tomorrow with Eri Alejandre

## 2023-12-06 NOTE — PROGRESS NOTES
[facility]        NAME: Lisa Edwards is a 80 y.o. female  : 1955    MRN: [id number]  DATE: 2023  TIME: 7:15 PM    Assessment and Plan   Herpes zoster without complication [N49.7]  1. Herpes zoster without complication  valACYclovir (VALTREX) 1,000 mg tablet            Patient Instructions     1. Over-the-counter acetaminophen as needed for pain. 2.  Go to the ER immediately for any significantly worsening symptoms. 3.  Follow-up with primary care in 1 to 2 weeks for recheck. Chief Complaint     Chief Complaint   Patient presents with    Herpes Zoster     Rash on left thigh very painful pain began 1 week ago without rash         History of Present Illness       61-year-old female patient with 1 day history of painful blistery rash noted on the anterior left thigh. Patient states she had several days of pain leading up to the outbreak of the rash. She states she may have had a day of fever without measuring her temperature. No other complaints. Review of Systems   Review of Systems   Constitutional:  Negative for chills and fever. HENT:  Negative for ear pain and sore throat. Eyes:  Negative for pain and visual disturbance. Respiratory:  Negative for cough and shortness of breath. Cardiovascular:  Negative for chest pain and palpitations. Gastrointestinal:  Negative for abdominal pain and vomiting. Genitourinary:  Negative for dysuria and hematuria. Musculoskeletal:  Negative for arthralgias and back pain. Skin:  Positive for rash. Negative for color change. Neurological:  Negative for seizures and syncope. All other systems reviewed and are negative.         Current Medications       Current Outpatient Medications:     acetaminophen (TYLENOL) 650 mg CR tablet, Take 1 tablet (650 mg total) by mouth every 8 (eight) hours as needed for headaches, moderate pain or mild pain, Disp: , Rfl: 0    Calcium Carb-Cholecalciferol 600-12.5 MG-MCG CAPS, Take by mouth daily in the early morning, Disp: , Rfl:     [facility] Aspirin Adult Low Strength 81 MG EC tablet, daily, Disp: , Rfl:     DULoxetine (CYMBALTA) 60 mg delayed release capsule, Take 1 capsule (60 mg total) by mouth daily, Disp: 90 capsule, Rfl: 1    esomeprazole (NexIUM) 40 MG capsule, Take 1 capsule (40 mg total) by mouth daily before breakfast, Disp: 90 capsule, Rfl: 5    gabapentin (NEURONTIN) 300 mg capsule, Take 1 capsule (300 mg total) by mouth 2 (two) times a day, Disp: 60 capsule, Rfl: 5    levothyroxine 50 mcg tablet, TAKE 1 TABLET BY MOUTH EVERY DAY, Disp: 90 tablet, Rfl: 1    Magnesium 400 MG TABS, Take 800 mg by mouth daily, Disp: , Rfl:     midodrine (PROAMATINE) 10 MG tablet, Take 1 tablet (10 mg total) by mouth in the morning Do not start before November 18, 2023., Disp: 30 tablet, Rfl: 0    predniSONE 10 mg tablet, Take 4 tablets (40 mg total) by mouth daily for 7 days, THEN 3.5 tablets (35 mg total) daily for 7 days, THEN 3 tablets (30 mg total) daily for 7 days, THEN 2.5 tablets (25 mg total) daily for 7 days, THEN 2 tablets (20 mg total) daily for 7 days. , Disp: 105 tablet, Rfl: 0    QUEtiapine (SEROquel) 100 mg tablet, Take 1 tablet (100 mg total) by mouth daily at bedtime, Disp: 90 tablet, Rfl: 1    Sodium Fluoride 5000 PPM 1.1 % PSTE, USE ONCE DAILY PREFERABLY AT NIGHT, Disp: , Rfl:     valACYclovir (VALTREX) 1,000 mg tablet, Take 1 tablet (1,000 mg total) by mouth 3 (three) times a day for 7 days, Disp: 21 tablet, Rfl: 0    albuterol (ProAir HFA) 90 mcg/act inhaler, Inhale 2 puffs every 6 (six) hours as needed for wheezing (Patient not taking: Reported on 12/5/2023), Disp: 8.5 g, Rfl: 0    budesonide (Pulmicort Flexhaler) 90 MCG/ACT inhaler, Inhale 1 puff 2 (two) times a day (Patient not taking: Reported on 12/5/2023), Disp: 1 each, Rfl: 5    calcium carbonate (OS-WILLY) 1250 (500 Ca) MG tablet, Take 1,250 mg by mouth (Patient not taking: Reported on 11/21/2023), Disp: , Rfl:     inFLIXimab (REMICADE) 100 mg, Inject into a catheter in a vein (Patient not taking: Reported on 12/5/2023), Disp: , Rfl:     sucralfate (CARAFATE) 1 g tablet, Take 1 tablet (1 g total) by mouth 2 (two) times a day (Patient not taking: Reported on 11/28/2023), Disp: 60 tablet, Rfl: 0    Current Allergies     Allergies as of 12/05/2023 - Reviewed 12/05/2023   Allergen Reaction Noted    Meperidine Lightheadedness and Other (See Comments) 01/11/2006    Sulfa antibiotics Hives 01/11/2006            The following portions of the patient's history were reviewed and updated as appropriate: allergies, current medications, past family history, past medical history, past social history, past surgical history and problem list.     Past Medical History:   Diagnosis Date    Abdominal adhesions     Anesthesia complication     difficult to wake up    Anxiety     Arthritis     Cancer ([address])     melanoma    Colon polyp     Crohn's disease ([address])     Depression     Depression     Disease of thyroid gland     Fibromyalgia     Fibromyalgia, primary     Fractures 2006    GERD (gastroesophageal reflux disease)     History of cholecystectomy 09/07/2019    Hyperlipidemia     Irregular heart beat     can be tachy; also has orthoststic hypotension    Lazy eye     resolved: 3/27/17    Medical clearance for psychiatric admission 07/13/2021    Melanoma ([address]) 2010    Osteoarthritis 07/2018    Osteopenia     with joint pain-elevated DEV    Psychiatric disorder     Shortness of breath     assoc with tachycardia    Stomach disorder 1995    Tinnitus     Wears glasses     for reading       Past Surgical History:   Procedure Laterality Date    ABDOMINAL SURGERY      lysis of adhesions x 2    APPENDECTOMY      CERVICAL FUSION      May 5,2021 and Jan. 01,2020    CHOLECYSTECTOMY      open    COLONOSCOPY      DILATION AND CURETTAGE OF UTERUS      FOOT SURGERY  05/2017    NECK SURGERY  01/2019 5/2021    NEUROMA EXCISION Right 05/26/2017    Procedure: EXCISION MASS / FIBROMA FOOT;  Surgeon: Yohana Carlson DPM;  Location: [facility];  Service:     PARS PLANA VITRECTOMY W/ REPAIR OF MACULAR HOLE  12/23/2020    CA XCAPSL CTRC RMVL INSJ IO LENS PROSTH W/O ECP Left 12/06/2021    Procedure: EXTRACTION EXTRACAPSULAR CATARACT PHACO INTRAOCULAR LENS (IOL); Surgeon: Cj Stephen MD;  Location: [facility] MAIN OR;  Service: Ophthalmology    RIGHT OOPHORECTOMY      WISDOM TOOTH EXTRACTION      x4       Family History   Problem Relation Age of Onset    Heart disease Mother     Stroke Mother 58    COPD Mother     Arthritis Mother     Hypertension Father     Dislocations Sister     Multiple sclerosis Sister     Neurological problems Sister     Scoliosis Sister     No Known Problems Maternal Grandmother     No Known Problems Maternal Grandfather     No Known Problems Paternal Grandmother     No Known Problems Paternal Grandfather     Kidney disease Brother         kidney transplant    No Known Problems Maternal Aunt     No Known Problems Maternal Uncle     No Known Problems Paternal Aunt     No Known Problems Paternal Uncle     ADD / ADHD Neg Hx     Anesthesia problems Neg Hx     Cancer Neg Hx     Clotting disorder Neg Hx     Collagen disease Neg Hx     Diabetes Neg Hx     Dislocations Neg Hx     Learning disabilities Neg Hx     Neurological problems Neg Hx     Osteoporosis Neg Hx     Rheumatologic disease Neg Hx     Scoliosis Neg Hx     Vascular Disease Neg Hx          Medications have been verified. Objective   Pulse 96   Temp 98.6 °F (37 °C)   Resp 18   Ht 5' 5" (1.651 m)   Wt 79.4 kg (175 lb)   LMP  (LMP Unknown)   SpO2 98%   BMI 29.12 kg/m²        Physical Exam     Physical Exam  Vitals and nursing note reviewed. Constitutional:       General: She is not in acute distress. Appearance: Normal appearance. She is not ill-appearing. HENT:      Head: Normocephalic.       Nose: Nose normal.      Mouth/Throat:      Mouth: Mucous membranes are moist.   Eyes: Conjunctiva/sclera: Conjunctivae normal.      Pupils: Pupils are equal, round, and reactive to light. Cardiovascular:      Rate and Rhythm: Normal rate and regular rhythm. Heart sounds: Normal heart sounds. Pulmonary:      Effort: Pulmonary effort is normal.   Musculoskeletal:         General: Normal range of motion. Cervical back: Normal range of motion. Skin:     Capillary Refill: Capillary refill takes less than 2 seconds. Comments: Red, vesicular, clustered rash noted in dermatomal pattern over the left anterior thigh. Neurological:      General: No focal deficit present. Mental Status: She is alert and oriented to person, place, and time.    Psychiatric:         Mood and Affect: Mood normal.         Behavior: Behavior normal.

## 2023-12-06 NOTE — PATIENT INSTRUCTIONS
1.  Over-the-counter acetaminophen as needed for pain. 2.  Go to the ER immediately for any significantly worsening symptoms. 3.  Follow-up with primary care in 1 to 2 weeks for recheck.

## 2023-12-11 ENCOUNTER — OFFICE VISIT (OUTPATIENT)
Dept: GASTROENTEROLOGY | Facility: CLINIC | Age: 68
End: 2023-12-11
Payer: COMMERCIAL

## 2023-12-11 VITALS
DIASTOLIC BLOOD PRESSURE: 96 MMHG | SYSTOLIC BLOOD PRESSURE: 142 MMHG | HEIGHT: 65 IN | HEART RATE: 89 BPM | BODY MASS INDEX: 28.66 KG/M2 | WEIGHT: 172 LBS

## 2023-12-11 DIAGNOSIS — K50.80 CROHN'S DISEASE OF BOTH SMALL AND LARGE INTESTINE WITHOUT COMPLICATION (HCC): Primary | ICD-10-CM

## 2023-12-11 DIAGNOSIS — K21.9 GASTROESOPHAGEAL REFLUX DISEASE, UNSPECIFIED WHETHER ESOPHAGITIS PRESENT: ICD-10-CM

## 2023-12-11 DIAGNOSIS — R13.14 PHARYNGOESOPHAGEAL DYSPHAGIA: ICD-10-CM

## 2023-12-11 PROCEDURE — 99213 OFFICE O/P EST LOW 20 MIN: CPT | Performed by: INTERNAL MEDICINE

## 2023-12-11 RX ORDER — MESALAMINE 1.2 G/1
4800 TABLET, DELAYED RELEASE ORAL
Qty: 120 TABLET | Refills: 2 | Status: SHIPPED | OUTPATIENT
Start: 2023-12-11 | End: 2024-03-10

## 2023-12-11 RX ORDER — ESOMEPRAZOLE MAGNESIUM 40 MG/1
40 CAPSULE, DELAYED RELEASE ORAL
Qty: 90 CAPSULE | Refills: 1 | Status: SHIPPED | OUTPATIENT
Start: 2023-12-11

## 2023-12-11 NOTE — PROGRESS NOTES
[facility] - Outpatient Follow-up Note  Adelaida Allred 80 y.o. female MRN: [id number]  Encounter: [id number]          ASSESSMENT AND PLAN:      1. Crohn's disease of both small and large intestine without complication ([address])  She is decided to discontinue treatment with infliximab. Currently on a tapering course of prednisone at 20 mg for the next week. We discussed options and I will refer her for consultation with Dr. Ge English, and our IBD specialist to discuss options. Will most likely recommend initiating another biologic, possibly Entyvio or [employer]. In the meantime I will start her on mesalamine to hopefully tide her over until she can be seen, though this is certainly not an ideal long-term option    - CBC; Future  - Comprehensive metabolic panel; Future  - C-reactive protein; Future  - mesalamine (LIALDA) 1.2 g EC tablet; Take 4 tablets (4.8 g total) by mouth daily with breakfast  Dispense: 120 tablet; Refill: 2  - Ambulatory Referral to Gastroenterology; Future    2. Pancreatic cyst  Noted on most recent CT enterography while hospitalized. Repeat MRI as previously recommended  ______________________________________________________________________    SUBJECTIVE: History of Crohn's disease affecting the ileocecal valve and terminal ileum diagnosed in May 2023 on colonoscopy performed for change in bowel habit. Responded to course of prednisone initially and was transitioned to infliximab which seems to be working for her but she had possible side effects with hair loss, bruising, myalgias and increased LFTs with concern for possible infliximab induced lupus-like syndrome, though this was not definitive. Lost IV access assisted through her last dose of infliximab in October and subsequently had onset of worsening abdominal pain and was admitted in mid November with symptoms of flare. CT enterography at that time suggested possible active inflammation in the sigmoid colon. Has been on a tapering course of prednisone and symptoms are improved but still with abdominal pain. No blood in her stool, no nausea or vomiting, fever or chills. Reports an episode of zoster on her left thigh after starting prednisone      REVIEW OF SYSTEMS:    Review of Systems   HENT:  Negative for sore throat. Cardiovascular:  Negative for chest pain. Respiratory:  Negative for shortness of breath. Skin:  Negative for rash.     Answers submitted by the patient for this visit:  Inflammatory Bowel Disease (Submitted on 12/10/2023)  Did the infection require hospitalization?: Yes  Did the infection require antibiotics?: Yes  Is there any possibility that you may be pregnant?: No  In the past three months, have you used tobacco in any form?: No  During the last year, how many days have you missed work or school because of your inflammatory bowel disease?: 10  During the last year, how many days have you been hospitalized because of your inflammatory bowel disease?: 10  During the last year, how many days have you visited a hospital emergency department because of your inflammatory bowel disease?: 2  During the last month, have you taken narcotic pain medications (such as Percocet, oxycodone, Oxycontin, morphine, Vicodin, Dilaudid, MS Contin) for your inflammatory bowel disease?: Yes  Have you awoken at night because you needed to move your bowels during the last month? : Yes  Have you had leakage of stool while sleeping during the last month?: No  Have you had leakage of stool while you were awake during the last month?: Yes  In the last 6 months, have you unintentionally lost weight?: No  Fever: Yes  Eye irritation: No  Mouth sores: Yes  Numbness or tingling in your hands or feet: Yes  Pain or swelling in your joints: Yes  Bruising or bleeding: Yes  During the last 3 days, have you felt depressed or blue?: No  Inflammatory Bowel Disease (Submitted on 12/10/2023)  When you are not experiencing symptoms of your inflammatory bowel disease, how many bowel movements do you typically have each day?: 0-1  What is the average (typical) number of bowel movements that you had in a single day during the last week?: 0  Over the last 3 days, have you had any bowel movements where you passed blood without stool?: No  Since your last visit, have you received any vaccinations?: No  Since the last visit, have you had an infection?: Yes        Historical Information   Past Medical History:   Diagnosis Date    Abdominal adhesions     Anesthesia complication     difficult to wake up    Anxiety     Arthritis     Cancer ([address])     melanoma    Colon polyp     Crohn's disease ([address])     Depression     Depression     Disease of thyroid gland     Fibromyalgia     Fibromyalgia, primary     Fractures 2006    GERD (gastroesophageal reflux disease)     History of cholecystectomy 09/07/2019    Hyperlipidemia     Irregular heart beat     can be tachy; also has orthoststic hypotension    Lazy eye     resolved: 3/27/17    Medical clearance for psychiatric admission 07/13/2021    Melanoma ([address]) 2010    Osteoarthritis 07/2018    Osteopenia     with joint pain-elevated DEV    Psychiatric disorder     Shortness of breath     assoc with tachycardia    Stomach disorder 1995    Tinnitus     Wears glasses     for reading     Past Surgical History:   Procedure Laterality Date    ABDOMINAL SURGERY      lysis of adhesions x 2    APPENDECTOMY      CERVICAL FUSION      May 5,2021 and Jan. 01,2020    CHOLECYSTECTOMY      open    COLONOSCOPY      DILATION AND CURETTAGE OF UTERUS      FOOT SURGERY  05/2017    NECK SURGERY  01/2019 5/2021    NEUROMA EXCISION Right 05/26/2017    Procedure: EXCISION MASS / FIBROMA FOOT;  Surgeon: Calista Curling, DPM;  Location: [facility];  Service:     PARS PLANA VITRECTOMY W/ REPAIR OF MACULAR HOLE  12/23/2020    WV XCAPSL CTRC RMVL INSJ IO LENS PROSTH W/O ECP Left 12/06/2021    Procedure: EXTRACTION EXTRACAPSULAR CATARACT PHACO INTRAOCULAR LENS (IOL);   Surgeon: Wm Galeas MD;  Location: [facility] MAIN OR;  Service: Ophthalmology    RIGHT OOPHORECTOMY      WISDOM TOOTH EXTRACTION      x4     Social History   Social History     Substance and Sexual Activity   Alcohol Use Not Currently     Social History     Substance and Sexual Activity   Drug Use No     Social History     Tobacco Use   Smoking Status Never   Smokeless Tobacco Never     Family History   Problem Relation Age of Onset    Heart disease Mother     Stroke Mother 58    COPD Mother     Arthritis Mother     Hypertension Father     Dislocations Sister     Multiple sclerosis Sister     Neurological problems Sister     Scoliosis Sister     No Known Problems Maternal Grandmother     No Known Problems Maternal Grandfather     No Known Problems Paternal Grandmother     No Known Problems Paternal Grandfather     Kidney disease Brother         kidney transplant    No Known Problems Maternal Aunt     No Known Problems Maternal Uncle     No Known Problems Paternal Aunt     No Known Problems Paternal Uncle     ADD / ADHD Neg Hx     Anesthesia problems Neg Hx     Cancer Neg Hx     Clotting disorder Neg Hx     Collagen disease Neg Hx     Diabetes Neg Hx     Dislocations Neg Hx     Learning disabilities Neg Hx     Neurological problems Neg Hx     Osteoporosis Neg Hx     Rheumatologic disease Neg Hx     Scoliosis Neg Hx     Vascular Disease Neg Hx        Meds/Allergies       Current Outpatient Medications:     acetaminophen (TYLENOL) 650 mg CR tablet    Calcium Carb-Cholecalciferol 600-12.5 MG-MCG CAPS    [employer] Aspirin Adult Low Strength 81 MG EC tablet    DULoxetine (CYMBALTA) 60 mg delayed release capsule    gabapentin (NEURONTIN) 300 mg capsule    levothyroxine 50 mcg tablet    Magnesium 400 MG TABS    mesalamine (LIALDA) 1.2 g EC tablet    midodrine (PROAMATINE) 10 MG tablet    predniSONE 10 mg tablet    QUEtiapine (SEROquel) 100 mg tablet    Sodium Fluoride 5000 PPM 1.1 % PSTE    valACYclovir (VALTREX) 1,000 mg tablet    albuterol (ProAir HFA) 90 mcg/act inhaler    budesonide (Pulmicort Flexhaler) 90 MCG/ACT inhaler    calcium carbonate (OS-WILLY) 1250 (500 Ca) MG tablet    esomeprazole (NexIUM) 40 MG capsule    inFLIXimab (REMICADE) 100 mg    sucralfate (CARAFATE) 1 g tablet    Allergies   Allergen Reactions    Meperidine Lightheadedness and Other (See Comments)    Sulfa Antibiotics Hives           Objective     Blood pressure 142/96, pulse 89, height 5' 5" (1.651 m), weight 78 kg (172 lb). Body mass index is 28.62 kg/m². PHYSICAL EXAM:      Physical Exam  Vitals and nursing note reviewed. Constitutional:       General: She is not in acute distress. Appearance: She is not ill-appearing. HENT:      Head: Normocephalic and atraumatic. Eyes:      General: No scleral icterus. Extraocular Movements: Extraocular movements intact. Cardiovascular:      Rate and Rhythm: Normal rate and regular rhythm. Pulmonary:      Effort: Pulmonary effort is normal. No respiratory distress. Abdominal:      General: There is no distension. Palpations: Abdomen is soft. Tenderness: There is abdominal tenderness. There is guarding. There is no rebound. Comments: Tender in the left lower and right upper quadrants to palpation   Skin:     General: Skin is warm and dry. Coloration: Skin is not cyanotic. Findings: No erythema. Neurological:      General: No focal deficit present. Mental Status: She is alert and oriented to person, place, and time. Psychiatric:         Mood and Affect: Mood normal.         Behavior: Behavior normal.          Lab Results:   No visits with results within 1 Day(s) from this visit. Latest known visit with results is:   No results displayed because visit has over 200 results.             Radiology Results:   CT small bowel enterography    Result Date: 11/15/2023  Narrative: CT ABDOMEN AND PELVIS WITH IV CONTRAST- ENTEROGRAPHY - WITH CONTRAST INDICATION:   History of Crohns and abdominal pain. COMPARISON: TECHNIQUE:  Contrast-enhanced CT examination of the abdomen and pelvis was performed utilizing thin section technique and after the administration of low density enteric contrast according to protocol designed specifically to obtain sensitive evaluation of the small bowel. Axial, sagittal, and coronal 2D reformatted images were created from the source data and submitted for interpretation. Radiation dose length product (DLP) for this visit:  496 mGy-cm . This examination, like all CT scans performed in the [facility], was performed utilizing techniques to minimize radiation dose exposure, including the use of iterative reconstruction and automated exposure control. IV Contrast:  100 mL of iohexol (OMNIPAQUE) Enteric Contrast: 1350 cc [place] FINDINGS: ENTEROGRAPHY: -Small bowel distension: Adequate. -Bowel: There are no segments of bowel wall thickening or hyperenhancement to indicate active inflammatory bowel disease. No disproportionate dilation of the small or large bowel. There is no stricture, fistula, sinus tract, or abscess in the abdomen or pelvis. -Mesenteric findings: None -Extraintestinal findings: None There is an approximately 15 cm long segment of the sigmoid colon which shows mild wall thickening and prominence of the adjacent vasa recta with mild inner wall enhancement. Diverticula involving the second and third portions of the duodenum. REMAINDER OF THE ABDOMEN AND PELVIS: LOWER CHEST: No clinically significant abnormality is identified in the visualized lower chest. No consolidation or effusion. LIVER: Diffuse hepatic steatosis. BILIARY: GALLBLADDER: Status post cholecystectomy. SPLEEN: Within normal limits. No suspicious lesion. Normal spleen size. PANCREAS: Pancreatic parenchyma is within normal limits. No main pancreatic ductal dilatation. No peripancreatic inflammation. ADRENAL GLANDS: Within normal limits. KIDNEYS/URETERS: Normal size and position. Symmetric enhancement. No suspicious lesion. No calcified stones or hydronephrosis. Ureters within normal limits. ABDOMINOPELVIC CAVITY: No ascites. No intraperitoneal free air. No lymphadenopathy. APPENDIX: Not visualized but no acute inflammatory process in the expected location of the appendix. VESSELS: Normal PELVIS REPRODUCTIVE ORGANS: Normal URINARY BLADDER: Collapsed but grossly unremarkable. ABDOMINAL WALL/INGUINAL REGIONS: Normal BONES: Mild degenerative changes throughout the spine. Impression: *  Inflammation: No evidence of active small bowel Crohn's disease. However, there is a 15 cm long segment of sigmoid colon which shows mild wall thickening, increased inner wall enhancement and mild engorgement of the adjacent vasa recta likely representing mild active Crohn's colitis. 1 *  Stricture: None. *  Penetrating complication: None. *  Perianal disease: None. *  Other complications: None. Workstation performed: [id number]     US right upper quadrant    Result Date: 11/15/2023  Narrative: RIGHT UPPER QUADRANT ULTRASOUND INDICATION:     RUQ pain. COMPARISON: Multiple prior studies, the most recent is a CT scan from earlier today. TECHNIQUE:   Real-time ultrasound of the right upper quadrant was performed with a curvilinear transducer with both volumetric sweeps and still imaging techniques. FINDINGS: PANCREAS:  Visualized portions of the pancreas are within normal limits. AORTA AND IVC:  Visualized portions are normal for patient age. LIVER: Size:  Within normal range. The liver measures 14.8 cm in the midclavicular line. Contour:  Surface contour is smooth. Parenchyma: Echogenic compatible with moderate hepatic steatosis. No liver mass identified. Limited imaging of the main portal vein shows it to be patent and hepatopetal. BILIARY: Status post cholecystectomy. No intrahepatic biliary dilatation. CBD measures 6.0 mm. No choledocholithiasis.  KIDNEY: Right kidney measures 9.7 x 4.5 x 4.5 cm. Volume 103.4 mL Kidney within normal limits. ASCITES:   None. Impression: Moderate hepatic steatosis. Otherwise normal right upper quadrant ultrasound exam. Workstation performed: [id number]     Echo complete w/ contrast if indicated    Result Date: 11/14/2023  Narrative:   Left Ventricle: Left ventricular cavity size is normal. Wall thickness is normal. The left ventricular ejection fraction is 60%. Systolic function is normal. Wall motion is normal. Diastolic function is normal.  Left atrial filling pressure is normal.   Left Atrium: The atrium is normal in size. IVC/SVC: The right atrial pressure is estimated at 3.0 mmHg. The inferior vena cava is normal in size. Respirophasic changes were normal.     PE Study with CT abdomen & pelvis with contrast    Result Date: 11/13/2023  Narrative: CT PULMONARY ANGIOGRAM OF THE CHEST AND CT ABDOMEN AND PELVIS WITH INTRAVENOUS CONTRAST INDICATION:   sob w/ exertion, tachycardia, bilateral rhonchi r.o PE, PNA. has crohns, ab pain x 3 days. r/o ab pathologies. COMPARISON: CT abdomen/pelvis 10/3/2023. CT chest 9/15/2023. MRI abdomen 7/25/2023. TECHNIQUE:  CT examination of the chest, abdomen and pelvis was performed. Thin section CT angiographic technique was used in the chest in order to evaluate for pulmonary embolus and coronal 3D MIP postprocessing was performed on the acquisition scanner. Axial, sagittal, and coronal 2D reformatted images were created from the source data and submitted for interpretation. Radiation dose length product (DLP) for this visit:  1077 mGy-cm . This examination, like all CT scans performed in the [facility], was performed utilizing techniques to minimize radiation dose exposure, including the use of iterative reconstruction and automated exposure control.  IV Contrast:  100 mL of iohexol (OMNIPAQUE) Enteric Contrast: None FINDINGS: PULMONARY ARTERIAL TREE:  No filling defects to suggest acute or chronic pulmonary embolism in the entire pulmonary arterial system. Normal caliber main pulmonary artery. LARGE AIRWAYS: Large airways are clear with no tracheal or endobronchial lesion. LUNGS: Somewhat limited evaluation of the lung parenchyma due to respiratory motion artifact. There is suggestion of mild mosaic attenuation compatible with small vessel/small airways disease. No suspicious lesions. No consolidation. No edema. PLEURA: No pleural effusion or pneumothorax. HEART: Normal cardiac size and morphology. No Coronary artery calcification. No pericardial effusion or thickening. VESSELS: Normal caliber thoracic aorta with no detectable atherosclerotic plaque. MEDIASTINUM AND LESLIE: Top-normal mediastinal lymph nodes measure up to 10 mm in short axis. No lymphadenopathy. Small hiatal hernia. CHEST WALL AND LOWER NECK:   Left chest wall implantable cardiac defibrillator. ABDOMEN: LIVER: Normal size and morphology. No suspicious lesion. BILIARY: No intrahepatic biliary ductal dilatation. Normal caliber common bile duct. GALLBLADDER: Gallbladder is surgically absent. SPLEEN: Within normal limits. No suspicious lesion. Normal spleen size. PANCREAS: Mildly atrophic. Known small pancreatic cysts better evaluated on prior MRI; see associated recommendations. No pancreatic/peripancreatic inflammation. ADRENAL GLANDS: Within normal limits. KIDNEYS/URETERS: Normal size and position. Symmetric enhancement. No suspicious lesion. No calcified stones or hydronephrosis. Ureters within normal limits. STOMACH AND BOWEL: Stomach is grossly within normal limits. Normal caliber small bowel. Normal caliber large bowel. No evidence of active small or large bowel inflammatory process. APPENDIX: No findings to suggest acute appendicitis. ABDOMINOPELVIC CAVITY: No ascites. No intraperitoneal free air. No lymphadenopathy. No retroperitoneal hematoma. VESSELS: Normal caliber abdominal aorta with no detectable atherosclerotic plaque.  The celiac, SMA, and LIAM are patent. The main, right, and left portal veins are patent. The SMV and splenic vein are patent. The hepatic veins are patent. PELVIS REPRODUCTIVE ORGANS: Anteverted uterus. There is no evidence of adnexal mass. URINARY BLADDER: Within normal limits. No calculi. ABDOMINAL WALL/INGUINAL REGIONS: Within normal limits. BONES: Mild multilevel degenerative changes in the spine. Vertebral body height is maintained. No acute fracture or destructive osseous lesion. Degenerative changes at the hips. Impression: 1. No filling defect to suggest acute or chronic pulmonary embolism in the entire pulmonary arterial system. 2.  Suggestion of mosaic lung parenchymal attenuation compatible with mild small vessel/small airways disease. 3.  No acute findings in the abdomen or pelvis.  Workstation performed: [id number]

## 2023-12-19 ENCOUNTER — TELEPHONE (OUTPATIENT)
Dept: FAMILY MEDICINE CLINIC | Facility: CLINIC | Age: 68
End: 2023-12-19

## 2023-12-21 ENCOUNTER — TELEPHONE (OUTPATIENT)
Dept: PULMONOLOGY | Facility: MEDICAL CENTER | Age: 68
End: 2023-12-21

## 2023-12-21 NOTE — TELEPHONE ENCOUNTER
LM for patient to call office back to schedule NP appointment for Abnormal CT , schedule next available.

## 2023-12-29 ENCOUNTER — CONSULT (OUTPATIENT)
Dept: PULMONOLOGY | Facility: MEDICAL CENTER | Age: 68
End: 2023-12-29
Payer: COMMERCIAL

## 2023-12-29 VITALS
TEMPERATURE: 97.7 F | SYSTOLIC BLOOD PRESSURE: 122 MMHG | BODY MASS INDEX: 28.89 KG/M2 | WEIGHT: 173.4 LBS | HEIGHT: 65 IN | DIASTOLIC BLOOD PRESSURE: 74 MMHG | OXYGEN SATURATION: 98 % | RESPIRATION RATE: 12 BRPM | HEART RATE: 105 BPM

## 2023-12-29 DIAGNOSIS — J47.9 BRONCHIECTASIS WITHOUT COMPLICATION (HCC): ICD-10-CM

## 2023-12-29 DIAGNOSIS — R93.89 ABNORMAL CT OF THE CHEST: ICD-10-CM

## 2023-12-29 DIAGNOSIS — J20.9 ACUTE BRONCHITIS: ICD-10-CM

## 2023-12-29 DIAGNOSIS — J45.20 MILD INTERMITTENT ASTHMA WITHOUT COMPLICATION: Primary | ICD-10-CM

## 2023-12-29 PROCEDURE — 99205 OFFICE O/P NEW HI 60 MIN: CPT | Performed by: STUDENT IN AN ORGANIZED HEALTH CARE EDUCATION/TRAINING PROGRAM

## 2023-12-29 RX ORDER — ALBUTEROL SULFATE 90 UG/1
2 AEROSOL, METERED RESPIRATORY (INHALATION) EVERY 6 HOURS PRN
Qty: 8.5 G | Refills: 0 | Status: SHIPPED | OUTPATIENT
Start: 2023-12-29

## 2023-12-29 NOTE — PROGRESS NOTES
Consultation - Pulmonary Medicine   Ina Tolbert 68 y.o. female MRN: [id number]      Reason for Consult: Asthma    Ina Tolbert is a 68 y.o. female with a PMH of Cervical Stenosis, Chron's, Orthostatic Hypotension, TIA, HTN, Hypothyroidism, Depression, Raynaud's who presents to establish care.     Asthma/Bronchiectasis - CT shows moscaism consistent with a bronchoconstrictive process with mild bronchiectasis. She has a complex history with a myriad of rheumatologic processes. Bronchiolitis obliterans and Bronchiectasis is associated with IBDs. Patient has just started therapy for Chron's and symptoms overall have improved. Initial evaluation would be PFTs. Asthma is another possibility and had this diagnosis back in 2018 with some atopy however I am favoring Chron's at this time.   - PFTs with BD  - Albuterol PRN  - [place] panel, Immunoglobulins    Edward Lobo MD  [facility]    _____________________________________________________________________    HPI:    Ina Tolbert is a 68 y.o. female with a PMH of Cervical Stenosis, Chron's, Orthostatic Hypotension, TIA, HTN, Hypothyroidism, Depression, Raynaud's who presents to establish care.     Recent hospitalization due to chron's - on steroid taper - overall symptoms greatly improved   C/B shingles   Denies frequent infections   Occasional Wheezing, coughing, no sputum production    Tobacco: Never  Asthma Hx: possible- Dx 2018- exertional shortness of breath, infrequent Albuterol use  Triggers/Allergies: Occasional Seasonal   Exposure/Work:  [employer]    Pets: Dog  CHF Hx: HTN  Pulm Meds: Albuterol  ET: Limited by hip, dyspnea with 2 Flights    PFT results:  The most recent pulmonary function tests were reviewed.  None    Imaging:  I personally reviewed the images on the PAC system pertinent to today's visit  CT chest  1.  No filling defect to suggest acute or chronic pulmonary embolism in the entire pulmonary arterial  "system.  2.  Suggestion of mosaic lung parenchymal attenuation compatible with mild small vessel/small airways disease.  3.  No acute findings in the abdomen or pelvis.    Other studies:  Laboratory Studies  6/2020- DEV 1:80, speckled pattern  SSA/SSB negative    1/22- SPEP wnl  ESR/CRP wnl  CPK,aldolase wnl  Hep B/C negative  RF/CCP negative  DEV 1:160 speckled, DEV panel negative  SM/RNP Negative  Kanwal-1 Negative  Complement normal     TTE      Left Ventricle: Left ventricular cavity size is normal. Wall thickness is normal. The left ventricular ejection fraction is 60%. Systolic function is normal. Wall motion is normal. Diastolic function is normal.  Left atrial filling pressure is normal.    Left Atrium: The atrium is normal in size.    IVC/SVC: The right atrial pressure is estimated at 3.0 mmHg. The inferior vena cava is normal in size. Respirophasic changes were normal.    PhysicalExamination:  Vitals:   /74 (BP Location: Left arm, Patient Position: Sitting, Cuff Size: Standard)   Pulse 105   Temp 97.7 °F (36.5 °C) (Tympanic)   Resp 12   Ht 5' 5\" (1.651 m)   Wt 78.7 kg (173 lb 6.4 oz)   LMP  (LMP Unknown)   SpO2 98%   BMI 28.86 kg/m²     Appearance -- NAD, speaking full sentences  Neuro -- A&Ox3  Neck -- no JVD  Heart -- RRR, no murmurs  Lungs -- CTAB  Abdomen -- soft, NTND  Extremities -- WWP, no LE edema  Skin -- no rash    Review of Systems:  Aside from what is mentioned in the HPI, the review of systems otherwise negative.    Immunization History   Administered Date(s) Administered    COVID-19 J&J (Reactful) vaccine 0.5 mL 04/28/2021    COVID-19 MODERNA VACC 0.25 ML IM BOOSTER 01/25/2022    COVID-19 MODERNA VACC 0.5 ML IM 01/25/2022    INFLUENZA 09/21/2012, 03/04/2014, 12/12/2014, 03/05/2018, 09/27/2018, 11/18/2019, 11/19/2020    Influenza Quadrivalent Preservative Free 3 years and older IM 03/05/2018    Influenza, high dose seasonal 0.7 mL 11/10/2021, 11/24/2022    Influenza, recombinant, " quadrivalent,injectable, preservative free 10/07/2020    Pneumococcal Conjugate 13-Valent 09/05/2019, 11/10/2021    Tdap 09/21/2012, 11/25/2020    Tuberculin Skin Test-PPD Intradermal 08/03/2021    Zoster Vaccine Recombinant 10/07/2020        Current Medications:    Current Outpatient Medications:     acetaminophen (TYLENOL) 650 mg CR tablet, Take 1 tablet (650 mg total) by mouth every 8 (eight) hours as needed for headaches, moderate pain or mild pain, Disp: , Rfl: 0    Calcium Carb-Cholecalciferol 600-12.5 MG-MCG CAPS, Take by mouth daily in the early morning, Disp: , Rfl:     [employer] Aspirin Adult Low Strength 81 MG EC tablet, daily, Disp: , Rfl:     DULoxetine (CYMBALTA) 60 mg delayed release capsule, Take 1 capsule (60 mg total) by mouth daily, Disp: 90 capsule, Rfl: 1    esomeprazole (NexIUM) 40 MG capsule, TAKE 1 CAPSULE BY MOUTH EVERY DAY BEFORE BREAKFAST, Disp: 90 capsule, Rfl: 1    gabapentin (NEURONTIN) 300 mg capsule, Take 1 capsule (300 mg total) by mouth 2 (two) times a day, Disp: 60 capsule, Rfl: 5    levothyroxine 50 mcg tablet, TAKE 1 TABLET BY MOUTH EVERY DAY, Disp: 90 tablet, Rfl: 1    Magnesium 400 MG TABS, Take 800 mg by mouth daily, Disp: , Rfl:     mesalamine (LIALDA) 1.2 g EC tablet, Take 4 tablets (4.8 g total) by mouth daily with breakfast, Disp: 120 tablet, Rfl: 2    QUEtiapine (SEROquel) 100 mg tablet, Take 1 tablet (100 mg total) by mouth daily at bedtime, Disp: 90 tablet, Rfl: 1    Sodium Fluoride 5000 PPM 1.1 % PSTE, USE ONCE DAILY PREFERABLY AT NIGHT, Disp: , Rfl:     albuterol (ProAir HFA) 90 mcg/act inhaler, Inhale 2 puffs every 6 (six) hours as needed for wheezing (Patient not taking: Reported on 12/5/2023), Disp: 8.5 g, Rfl: 0    budesonide (Pulmicort Flexhaler) 90 MCG/ACT inhaler, Inhale 1 puff 2 (two) times a day (Patient not taking: Reported on 12/5/2023), Disp: 1 each, Rfl: 5    calcium carbonate (OS-WILLY) 1250 (500 Ca) MG tablet, Take 1,250 mg by mouth (Patient not taking:  Reported on 11/21/2023), Disp: , Rfl:     inFLIXimab (REMICADE) 100 mg, Inject into a catheter in a vein (Patient not taking: Reported on 12/5/2023), Disp: , Rfl:     midodrine (PROAMATINE) 10 MG tablet, Take 1 tablet (10 mg total) by mouth in the morning Do not start before November 18, 2023., Disp: 30 tablet, Rfl: 0    sucralfate (CARAFATE) 1 g tablet, Take 1 tablet (1 g total) by mouth 2 (two) times a day (Patient not taking: Reported on 12/11/2023), Disp: 60 tablet, Rfl: 0    valACYclovir (VALTREX) 1,000 mg tablet, Take 1 tablet (1,000 mg total) by mouth 3 (three) times a day for 7 days, Disp: 21 tablet, Rfl: 0    Historical Information   Past Medical History:   Diagnosis Date    Abdominal adhesions     Anesthesia complication     difficult to wake up    Anxiety     Arthritis     Cancer (HCC)     melanoma    Colon polyp     Crohn's disease (HCC)     Depression     Depression     Disease of thyroid gland     Fibromyalgia     Fibromyalgia, primary     Fractures 2006    GERD (gastroesophageal reflux disease)     History of cholecystectomy 09/07/2019    Hyperlipidemia     Irregular heart beat     can be tachy; also has orthoststic hypotension    Lazy eye     resolved: 3/27/17    Medical clearance for psychiatric admission 07/13/2021    Melanoma (HCC) 2010    Mild asthma 11/04/2020    Osteoarthritis 07/2018    Osteopenia     with joint pain-elevated DEV    Psychiatric disorder     Shortness of breath     assoc with tachycardia    Stomach disorder 1995    Tinnitus     Wears glasses     for reading     Past Surgical History:   Procedure Laterality Date    ABDOMINAL SURGERY      lysis of adhesions x 2    APPENDECTOMY      CERVICAL FUSION      May 5,2021 and Jan. 01,2020    CHOLECYSTECTOMY      open    COLONOSCOPY      DILATION AND CURETTAGE OF UTERUS      FOOT SURGERY  05/2017    NECK SURGERY  01/2019 5/2021    NEUROMA EXCISION Right 05/26/2017    Procedure: EXCISION MASS / FIBROMA FOOT;  Surgeon: Jorden Crespo DPM;   Location: [facility];  Service:     PARS PLANA VITRECTOMY W/ REPAIR OF MACULAR HOLE  12/23/2020    MS XCAPSL CTRC RMVL INSJ IO LENS PROSTH W/O ECP Left 12/06/2021    Procedure: EXTRACTION EXTRACAPSULAR CATARACT PHACO INTRAOCULAR LENS (IOL);  Surgeon: Mandeep Chavez MD;  Location: [facility] MAIN OR;  Service: Ophthalmology    RIGHT OOPHORECTOMY      WISDOM TOOTH EXTRACTION      x4     Social History   Social History     Tobacco Use   Smoking Status Never   Smokeless Tobacco Never       Family History:   Family History   Problem Relation Age of Onset    Heart disease Mother     Stroke Mother 62    COPD Mother     Arthritis Mother     Hypertension Father     Dislocations Sister     Multiple sclerosis Sister     Neurological problems Sister     Scoliosis Sister     No Known Problems Maternal Grandmother     No Known Problems Maternal Grandfather     No Known Problems Paternal Grandmother     No Known Problems Paternal Grandfather     Kidney disease Brother         kidney transplant    No Known Problems Maternal Aunt     No Known Problems Maternal Uncle     No Known Problems Paternal Aunt     No Known Problems Paternal Uncle     ADD / ADHD Neg Hx     Anesthesia problems Neg Hx     Cancer Neg Hx     Clotting disorder Neg Hx     Collagen disease Neg Hx     Diabetes Neg Hx     Dislocations Neg Hx     Learning disabilities Neg Hx     Neurological problems Neg Hx     Osteoporosis Neg Hx     Rheumatologic disease Neg Hx     Scoliosis Neg Hx     Vascular Disease Neg Hx                  Diagnostic Data:  Labs:  I personally reviewed the most recent laboratory data pertinent to today's visit    Lab Results   Component Value Date    WBC 4.48 11/17/2023    HGB 12.4 11/17/2023    HCT 39.1 11/17/2023    MCV 90 11/17/2023     11/17/2023     Lab Results   Component Value Date    GLUCOSE 95 08/04/2016    CALCIUM 8.5 11/17/2023     08/04/2016    K 4.4 11/17/2023    CO2 22 11/17/2023     11/17/2023    BUN 11 11/17/2023     "CREATININE 0.56 (L) 11/17/2023     No results found for: \"IGE\"  Lab Results   Component Value Date    ALT 53 (H) 11/17/2023    AST 39 11/17/2023    ALKPHOS 73 11/17/2023    BILITOT 0.2 08/04/2016           I have spent a total time of 55 minutes on 12/29/23 in caring for this patient including Diagnostic results, Prognosis, Risks and benefits of tx options, Instructions for management, Patient and family education, Importance of tx compliance, Risk factor reductions, Impressions, Counseling / Coordination of care, Documenting in the medical record, Reviewing / ordering tests, medicine, procedures  , Obtaining or reviewing history  , and Communicating with other healthcare professionals .   _        "

## 2024-01-09 ENCOUNTER — NURSE TRIAGE (OUTPATIENT)
Age: 69
End: 2024-01-09

## 2024-01-12 LAB
A ALTERNATA IGE QN: <0.1 KU/L
A FUMIGATUS IGE QN: <0.1 KU/L
BERMUDA GRASS IGE QN: <0.1 KU/L
BOXELDER IGE QN: <0.1 KU/L
C HERBARUM IGE QN: <0.1 KU/L
CAT DANDER IGE QN: <0.1 KU/L
CMN PIGWEED IGE QN: <0.1 KU/L
COMMON RAGWEED IGE QN: <0.1 KU/L
COTTONWOOD IGE QN: <0.1 KU/L
D FARINAE IGE QN: <0.1 KU/L
D PTERONYSS IGE QN: <0.1 KU/L
DOG DANDER IGE QN: <0.1 KU/L
IGA SERPL-MCNC: 218 MG/DL (ref 87–352)
IGE SERPL-ACNC: <2 IU/ML (ref 6–495)
IGG SERPL-MCNC: 714 MG/DL (ref 586–1602)
IGG1 SER-MCNC: 436 MG/DL (ref 248–810)
IGG2 SER-MCNC: 201 MG/DL (ref 130–555)
IGG3 SER-MCNC: 59 MG/DL (ref 15–102)
IGG4 SER-MCNC: 25 MG/DL (ref 2–96)
IGM SERPL-MCNC: 120 MG/DL (ref 26–217)
LONDON PLANE IGE QN: <0.1 KU/L
Lab: ABNORMAL
MOUSE URINE PROT IGE QN: <0.1 KU/L
MT JUNIPER IGE QN: <0.1 KU/L
MUGWORT IGE QN: <0.1 KU/L
PENICILLIUM CHRYSOGENUM: <0.1 KU/L
ROACH IGE QN: <0.1 KU/L
SHEEP SORREL IGE QN: <0.1 KU/L
SILVER BIRCH IGE QN: <0.1 KU/L
TIMOTHY IGE QN: <0.1 KU/L
WALNUT IGE: <0.1 KU/L
WHITE ASH IGE QN: <0.1 KU/L
WHITE ELM IGE QN: <0.1 KU/L
WHITE MULBERRY IGE QN: <0.1 KU/L
WHITE OAK IGE QN: <0.1 KU/L

## 2024-01-15 NOTE — TELEPHONE ENCOUNTER
"Clarence from [facility] called to confirm pt stopped Remicade as they see last fill in October. Would like confirmation to discharge pt.     [facility] # [phone]    Reason for Disposition   Nursing judgment    Answer Assessment - Initial Assessment Questions  1. REASON FOR CALL or QUESTION: \"What is your reason for calling today?\" or \"How can I best help you?\" or \"What question do you have that I can help answer?\"      [facility] calling to confirm pt is off Remicade; looking to discharge pt from services.    Protocols used: Information Only Call - No Triage-ADULT-[place]    "
Called patient, reached voicemail, left message to call back to confirm as per Dr. Lopez's OV 12/11 pt decided to discontinue treatment with infliximab.   
Called patient, reached voicemail, left message to call back to confirm with patient ok to discharge patient from [facility] in regards to infliximab infusions. Letter mailed to the patient.   
oral

## 2024-01-17 ENCOUNTER — OFFICE VISIT (OUTPATIENT)
Dept: OBGYN CLINIC | Facility: CLINIC | Age: 69
End: 2024-01-17
Payer: COMMERCIAL

## 2024-01-17 ENCOUNTER — APPOINTMENT (OUTPATIENT)
Dept: RADIOLOGY | Facility: CLINIC | Age: 69
End: 2024-01-17
Payer: COMMERCIAL

## 2024-01-17 ENCOUNTER — TELEPHONE (OUTPATIENT)
Dept: FAMILY MEDICINE CLINIC | Facility: CLINIC | Age: 69
End: 2024-01-17

## 2024-01-17 VITALS
SYSTOLIC BLOOD PRESSURE: 123 MMHG | BODY MASS INDEX: 28.99 KG/M2 | HEART RATE: 114 BPM | DIASTOLIC BLOOD PRESSURE: 82 MMHG | WEIGHT: 174 LBS | HEIGHT: 65 IN

## 2024-01-17 DIAGNOSIS — Z86.79 HISTORY OF ORTHOSTATIC HYPOTENSION: ICD-10-CM

## 2024-01-17 DIAGNOSIS — M25.561 RIGHT KNEE PAIN, UNSPECIFIED CHRONICITY: ICD-10-CM

## 2024-01-17 DIAGNOSIS — G93.41 METABOLIC ENCEPHALOPATHY: ICD-10-CM

## 2024-01-17 DIAGNOSIS — Z01.818 PREOP EXAMINATION: ICD-10-CM

## 2024-01-17 DIAGNOSIS — M25.461 EFFUSION OF RIGHT KNEE: ICD-10-CM

## 2024-01-17 DIAGNOSIS — M17.11 PRIMARY OSTEOARTHRITIS OF RIGHT KNEE: Primary | ICD-10-CM

## 2024-01-17 DIAGNOSIS — Z86.73 HISTORY OF TIA (TRANSIENT ISCHEMIC ATTACK): ICD-10-CM

## 2024-01-17 DIAGNOSIS — M25.561 CHRONIC PAIN OF RIGHT KNEE: ICD-10-CM

## 2024-01-17 DIAGNOSIS — G89.29 CHRONIC PAIN OF RIGHT KNEE: ICD-10-CM

## 2024-01-17 DIAGNOSIS — Z87.39 HISTORY OF FIBROMYALGIA: ICD-10-CM

## 2024-01-17 DIAGNOSIS — M21.061 ACQUIRED VALGUS DEFORMITY KNEE, RIGHT: ICD-10-CM

## 2024-01-17 DIAGNOSIS — Z87.19 HISTORY OF GI BLEED: ICD-10-CM

## 2024-01-17 PROCEDURE — 73562 X-RAY EXAM OF KNEE 3: CPT

## 2024-01-17 PROCEDURE — 73560 X-RAY EXAM OF KNEE 1 OR 2: CPT

## 2024-01-17 PROCEDURE — 99215 OFFICE O/P EST HI 40 MIN: CPT | Performed by: ORTHOPAEDIC SURGERY

## 2024-01-17 RX ORDER — MELATONIN
1000 DAILY
Qty: 30 TABLET | Refills: 0 | Status: SHIPPED | OUTPATIENT
Start: 2024-01-17 | End: 2024-01-22 | Stop reason: SDUPTHER

## 2024-01-17 RX ORDER — ASCORBIC ACID 500 MG
500 TABLET ORAL 2 TIMES DAILY
Qty: 60 TABLET | Refills: 0 | Status: SHIPPED | OUTPATIENT
Start: 2024-01-17 | End: 2024-01-19

## 2024-01-17 RX ORDER — ZINC SULFATE 50(220)MG
220 CAPSULE ORAL DAILY
Qty: 30 CAPSULE | Refills: 0 | Status: SHIPPED | OUTPATIENT
Start: 2024-01-17 | End: 2024-02-16

## 2024-01-17 RX ORDER — FERROUS SULFATE 324(65)MG
324 TABLET, DELAYED RELEASE (ENTERIC COATED) ORAL
Qty: 30 TABLET | Refills: 0 | Status: SHIPPED | OUTPATIENT
Start: 2024-01-17 | End: 2024-02-16

## 2024-01-17 RX ORDER — FOLIC ACID 1 MG/1
1 TABLET ORAL DAILY
Qty: 30 TABLET | Refills: 0 | Status: SHIPPED | OUTPATIENT
Start: 2024-01-17 | End: 2024-02-16

## 2024-01-17 NOTE — TELEPHONE ENCOUNTER
Pt rescheduled for next week because she was not going for the cardiac clearance or the BW until the 19th. NFA

## 2024-01-17 NOTE — TELEPHONE ENCOUNTER
Pt is scheduled for preop clearance later this week.  Please call to make sure she already has cardiac clearance , as this needs to be completed prior to medical clearance, and bloodwork needs to be done at least 48 hours prior to visit so it can reviewed at time of visit.  Can push appt out until next week if needed. SHRAVAN Lee

## 2024-01-17 NOTE — PROGRESS NOTES
Assessment/Plan:  1. Primary osteoarthritis of right knee  Case request operating room: ARTHROPLASTY KNEE TOTAL W ROBOT - RIGHT - PRESS FIT - OVERNIGHT    Comprehensive metabolic panel    Hemoglobin A1C W/EAG Estimation    CBC and differential    If Symptomatic, order: UA w Reflex to Microscopic w Reflex to Culture    Protime-INR    APTT    MRSA culture    Ambulatory referral to Cardiology    Ambulatory referral to Family Practice    Ambulatory referral to Physical Therapy    EKG 12 lead    Case request operating room: ARTHROPLASTY KNEE TOTAL W ROBOT - RIGHT - PRESS FIT - OVERNIGHT    ascorbic acid (VITAMIN C) 500 MG tablet    ferrous sulfate 324 (65 Fe) mg    folic acid (FOLVITE) 1 mg tablet    zinc sulfate (ZINCATE) 220 mg capsule    cholecalciferol (VITAMIN D3) 1,000 units tablet      2. Chronic pain of right knee  XR knee 1 or 2 vw left    XR knee 3 vw right non injury    Case request operating room: ARTHROPLASTY KNEE TOTAL W ROBOT - RIGHT - PRESS FIT - OVERNIGHT    Comprehensive metabolic panel    Hemoglobin A1C W/EAG Estimation    CBC and differential    If Symptomatic, order: UA w Reflex to Microscopic w Reflex to Culture    Protime-INR    APTT    MRSA culture    Ambulatory referral to Cardiology    Ambulatory referral to Family Practice    Ambulatory referral to Physical Therapy    EKG 12 lead    Case request operating room: ARTHROPLASTY KNEE TOTAL W ROBOT - RIGHT - PRESS FIT - OVERNIGHT    ascorbic acid (VITAMIN C) 500 MG tablet    ferrous sulfate 324 (65 Fe) mg    folic acid (FOLVITE) 1 mg tablet    zinc sulfate (ZINCATE) 220 mg capsule    cholecalciferol (VITAMIN D3) 1,000 units tablet      3. Acquired valgus deformity knee, right  Comprehensive metabolic panel    Hemoglobin A1C W/EAG Estimation    CBC and differential    If Symptomatic, order: UA w Reflex to Microscopic w Reflex to Culture    Protime-INR    APTT    MRSA culture    Ambulatory referral to Cardiology    Ambulatory referral to Family  Practice    Ambulatory referral to Physical Therapy    EKG 12 lead      4. Effusion of right knee        5. History of GI bleed        6. History of TIA (transient ischemic attack)  Ambulatory referral to Cardiology      7. History of orthostatic hypotension        8. Metabolic encephalopathy        9. History of fibromyalgia        10. Preop examination  Ambulatory referral to Cardiology    Ambulatory referral to Family Practice    Ambulatory referral to Physical Therapy        Scribe Attestation      I,:  Kong Zarate am acting as a scribe while in the presence of the attending physician.:       I,:  Jairon Kim, DO personally performed the services described in this documentation    as scribed in my presence.:           Ina is a pleasant 68-year-old female who returns today for follow-up evaluation of her right knee pain.  Unfortunately, she is experiencing pain refractory to activity modification, maintenance of appropriate weight, over-the-counter analgesics, physical therapy, and intra-articular injection therapy.  Based on the progression of her underlying disease and her persistent pain, I explained that she is a good candidate for robotic assisted right total knee arthroplasty. The pre kamilla and postoperative expectations were discussed here in the office today. The risks and benefits of undergoing robotic assisted right total knee arthroplasty were discussed at length and consents were signed and placed in the chart.  Please see risk discussion below.  She denies a history of DVT/PE, MRSA infection, diabetes, and known skin malignancy.  She does have a history of GI bleeding 20 years ago and we will avoid NSAID medications.  She is not a smoker and is not using turmeric.  She understands she requires preoperative clearance from her primary care physician and cardiologist.  We will utilize press-fit implants for her and she will be placed on the overnight discharge pathway.  She will meet with my  surgery scheduler today to pick a date for procedure and make preoperative arrangements.  All of her questions and concerns were addressed today.  We will see her back at time of surgery.    Subjective: Follow-up evaluation for right knee pain    Patient ID: Ina Tolbert is a 68 y.o. female who returns today for follow-up evaluation of her right knee pain.  She received a corticosteroid injection on 8/17/2023.  At today's visit, she reports that she did receive incomplete relief from the injection.  It did mitigate her pain for short time, but she complains of a gradual return and worsening of her activity related pain throughout the fall and winter.  She describes constant aching pain about the knee that can be sharp and 10/10 depending on her activity and movement with the knee.  She reports declining quality of life and is interested in discussing surgery.  She works as a  for women who suffer with addiction or victims of trafficking.  Her  lives with her and her sister is also available to help her postoperatively.  She denies any recent injury or trauma.    Review of Systems   Constitutional:  Positive for activity change. Negative for chills, fever and unexpected weight change.   HENT:  Negative for hearing loss, nosebleeds and sore throat.    Eyes:  Negative for pain, redness and visual disturbance.   Respiratory:  Negative for cough, shortness of breath and wheezing.    Cardiovascular:  Negative for chest pain, palpitations and leg swelling.   Gastrointestinal:  Negative for abdominal pain, nausea and vomiting.   Endocrine: Negative for polydipsia and polyuria.   Genitourinary:  Negative for dysuria and hematuria.   Musculoskeletal:  Positive for arthralgias and myalgias. Negative for joint swelling.        See HPI   Skin:  Negative for rash and wound.   Neurological:  Negative for dizziness, numbness and headaches.   Psychiatric/Behavioral:  Negative for decreased concentration and  suicidal ideas. The patient is not nervous/anxious.          Past Medical History:   Diagnosis Date    Abdominal adhesions     Anesthesia complication     difficult to wake up    Anxiety     Arthritis     Cancer (HCC)     melanoma    Colon polyp     Crohn's disease (HCC)     Depression     Depression     Disease of thyroid gland     Fibromyalgia     Fibromyalgia, primary     Fractures 2006    GERD (gastroesophageal reflux disease)     History of cholecystectomy 09/07/2019    Hyperlipidemia     Irregular heart beat     can be tachy; also has orthoststic hypotension    Lazy eye     resolved: 3/27/17    Medical clearance for psychiatric admission 07/13/2021    Melanoma (HCC) 2010    Mild asthma 11/04/2020    Osteoarthritis 07/2018    Osteopenia     with joint pain-elevated DEV    Psychiatric disorder     Shortness of breath     assoc with tachycardia    Stomach disorder 1995    Tinnitus     Wears glasses     for reading       Past Surgical History:   Procedure Laterality Date    ABDOMINAL SURGERY      lysis of adhesions x 2    APPENDECTOMY      CERVICAL FUSION      May 5,2021 and Jan. 01,2020    CHOLECYSTECTOMY      open    COLONOSCOPY      DILATION AND CURETTAGE OF UTERUS      FOOT SURGERY  05/2017    NECK SURGERY  01/2019 5/2021    NEUROMA EXCISION Right 05/26/2017    Procedure: EXCISION MASS / FIBROMA FOOT;  Surgeon: Jorden Crespo DPM;  Location: [facility];  Service:     PARS PLANA VITRECTOMY W/ REPAIR OF MACULAR HOLE  12/23/2020    VT XCAPSL CTRC RMVL INSJ IO LENS PROSTH W/O ECP Left 12/06/2021    Procedure: EXTRACTION EXTRACAPSULAR CATARACT PHACO INTRAOCULAR LENS (IOL);  Surgeon: Mandeep Chavez MD;  Location: [facility] MAIN OR;  Service: Ophthalmology    RIGHT OOPHORECTOMY      WISDOM TOOTH EXTRACTION      x4       Family History   Problem Relation Age of Onset    Heart disease Mother     Stroke Mother 62    COPD Mother     Arthritis Mother     Hypertension Father     Dislocations Sister     Multiple sclerosis  Sister     Neurological problems Sister     Scoliosis Sister     No Known Problems Maternal Grandmother     No Known Problems Maternal Grandfather     No Known Problems Paternal Grandmother     No Known Problems Paternal Grandfather     Kidney disease Brother         kidney transplant    No Known Problems Maternal Aunt     No Known Problems Maternal Uncle     No Known Problems Paternal Aunt     No Known Problems Paternal Uncle     ADD / ADHD Neg Hx     Anesthesia problems Neg Hx     Cancer Neg Hx     Clotting disorder Neg Hx     Collagen disease Neg Hx     Diabetes Neg Hx     Dislocations Neg Hx     Learning disabilities Neg Hx     Neurological problems Neg Hx     Osteoporosis Neg Hx     Rheumatologic disease Neg Hx     Scoliosis Neg Hx     Vascular Disease Neg Hx        Social History     Occupational History    Not on file   Tobacco Use    Smoking status: Never    Smokeless tobacco: Never   Vaping Use    Vaping status: Never Used   Substance and Sexual Activity    Alcohol use: Not Currently    Drug use: No    Sexual activity: Not Currently     Partners: Male         Current Outpatient Medications:     acetaminophen (TYLENOL) 650 mg CR tablet, Take 1 tablet (650 mg total) by mouth every 8 (eight) hours as needed for headaches, moderate pain or mild pain, Disp: , Rfl: 0    albuterol (ProAir HFA) 90 mcg/act inhaler, Inhale 2 puffs every 6 (six) hours as needed for wheezing, Disp: 8.5 g, Rfl: 0    ascorbic acid (VITAMIN C) 500 MG tablet, Take 1 tablet (500 mg total) by mouth 2 (two) times a day, Disp: 60 tablet, Rfl: 0    Calcium Carb-Cholecalciferol 600-12.5 MG-MCG CAPS, Take by mouth daily in the early morning, Disp: , Rfl:     cholecalciferol (VITAMIN D3) 1,000 units tablet, Take 1 tablet (1,000 Units total) by mouth daily, Disp: 30 tablet, Rfl: 0    [employer] Aspirin Adult Low Strength 81 MG EC tablet, daily, Disp: , Rfl:     DULoxetine (CYMBALTA) 60 mg delayed release capsule, Take 1 capsule (60 mg total) by mouth  daily, Disp: 90 capsule, Rfl: 1    esomeprazole (NexIUM) 40 MG capsule, TAKE 1 CAPSULE BY MOUTH EVERY DAY BEFORE BREAKFAST, Disp: 90 capsule, Rfl: 1    ferrous sulfate 324 (65 Fe) mg, Take 1 tablet (324 mg total) by mouth daily before breakfast, Disp: 30 tablet, Rfl: 0    folic acid (FOLVITE) 1 mg tablet, Take 1 tablet (1 mg total) by mouth daily, Disp: 30 tablet, Rfl: 0    gabapentin (NEURONTIN) 300 mg capsule, Take 1 capsule (300 mg total) by mouth 2 (two) times a day, Disp: 60 capsule, Rfl: 5    levothyroxine 50 mcg tablet, TAKE 1 TABLET BY MOUTH EVERY DAY, Disp: 90 tablet, Rfl: 1    Magnesium 400 MG TABS, Take 800 mg by mouth daily, Disp: , Rfl:     mesalamine (LIALDA) 1.2 g EC tablet, Take 4 tablets (4.8 g total) by mouth daily with breakfast, Disp: 120 tablet, Rfl: 2    QUEtiapine (SEROquel) 100 mg tablet, Take 1 tablet (100 mg total) by mouth daily at bedtime, Disp: 90 tablet, Rfl: 1    Sodium Fluoride 5000 PPM 1.1 % PSTE, USE ONCE DAILY PREFERABLY AT NIGHT, Disp: , Rfl:     zinc sulfate (ZINCATE) 220 mg capsule, Take 1 capsule (220 mg total) by mouth daily, Disp: 30 capsule, Rfl: 0    calcium carbonate (OS-WILLY) 1250 (500 Ca) MG tablet, Take 1,250 mg by mouth (Patient not taking: Reported on 11/21/2023), Disp: , Rfl:     inFLIXimab (REMICADE) 100 mg, Inject into a catheter in a vein (Patient not taking: Reported on 12/5/2023), Disp: , Rfl:     midodrine (PROAMATINE) 10 MG tablet, Take 1 tablet (10 mg total) by mouth in the morning Do not start before November 18, 2023., Disp: 30 tablet, Rfl: 0    sucralfate (CARAFATE) 1 g tablet, Take 1 tablet (1 g total) by mouth 2 (two) times a day (Patient not taking: Reported on 12/11/2023), Disp: 60 tablet, Rfl: 0    valACYclovir (VALTREX) 1,000 mg tablet, Take 1 tablet (1,000 mg total) by mouth 3 (three) times a day for 7 days, Disp: 21 tablet, Rfl: 0    Allergies   Allergen Reactions    Meperidine Lightheadedness and Other (See Comments)    Sulfa Antibiotics Hives        Objective:  Vitals:    01/17/24 0751   BP: 123/82   Pulse: (!) 114       Body mass index is 28.96 kg/m².    Right Knee Exam     Tenderness   The patient is experiencing tenderness in the medial joint line, lateral joint line and patella.    Range of Motion   Extension:  0 normal   Flexion:  120 normal     Tests   Varus: negative Valgus: negative  Drawer:  Anterior - negative    Posterior - negative  Patellar apprehension: negative    Other   Erythema: absent  Scars: absent  Sensation: normal  Pulse: present  Swelling: none  Effusion: effusion (scant) present    Comments:  Valgus deformity passively correctable  Stable at 0, 30 and 90 degrees  Neurovascular intact distally  No warmth or erythema  Parapatellar crepitance noted  Patellofemoral grind: Positive          Observations     Right Knee   Positive for effusion (scant).       Physical Exam  Vitals and nursing note reviewed.   Constitutional:       Appearance: Normal appearance. She is well-developed.   HENT:      Head: Normocephalic and atraumatic.      Right Ear: External ear normal.      Left Ear: External ear normal.   Eyes:      General: No scleral icterus.     Extraocular Movements: Extraocular movements intact.      Conjunctiva/sclera: Conjunctivae normal.   Cardiovascular:      Rate and Rhythm: Normal rate.   Pulmonary:      Effort: Pulmonary effort is normal. No respiratory distress.   Musculoskeletal:      Cervical back: Normal range of motion and neck supple.      Right knee: Effusion (scant) present.      Comments: See Ortho exam   Skin:     General: Skin is warm and dry.   Neurological:      General: No focal deficit present.      Mental Status: She is alert and oriented to person, place, and time.   Psychiatric:         Behavior: Behavior normal.         I have personally reviewed pertinent films in PACS.    X-ray of the right knee obtained on 1/17/2024 reviewed demonstrating severe degenerative change in a valgus pattern with near  bone-on-bone contact laterally.  There is tricompartmental sclerosis and osteophytosis.  There is no acute fracture, dislocation, lytic or blastic lesion.    The patient was counseled in detail regarding the diagnosis, the treatment options available, the prognosis of each treatment option, the potential risks and complications.  These are, but are not limited to; deep vein thrombosis, pulmonary embolism, neurologic and vascular injury, infection, instability, leg length discrepancy, dislocation, hematoma, reflex sympathetic dystrophy, loss of range of motion, ankylosis of the knee, fracture, screw or prosthetic perforation, chronic pain, acute pain, chronic leg pain and edema, loosening, death, heart attack, and stroke.  The patient's questions were answered in detail.  The patient demonstrates understanding of these risks and wishes to proceed with surgery.    This document was created using speech voice recognition software.   Grammatical errors, random word insertions, pronoun errors, and incomplete sentences are an occasional consequence of this system due to software limitations, ambient noise, and hardware issues.   Any formal questions or concerns about content, text, or information contained within the body of this dictation should be directly addressed to the provider for clarification.

## 2024-01-18 ENCOUNTER — TELEPHONE (OUTPATIENT)
Dept: OBGYN CLINIC | Facility: HOSPITAL | Age: 69
End: 2024-01-18

## 2024-01-18 ENCOUNTER — APPOINTMENT (OUTPATIENT)
Dept: LAB | Facility: CLINIC | Age: 69
End: 2024-01-18
Payer: COMMERCIAL

## 2024-01-18 DIAGNOSIS — M21.061 ACQUIRED VALGUS DEFORMITY KNEE, RIGHT: ICD-10-CM

## 2024-01-18 DIAGNOSIS — M17.11 PRIMARY OSTEOARTHRITIS OF RIGHT KNEE: ICD-10-CM

## 2024-01-18 DIAGNOSIS — M17.11 PRIMARY OSTEOARTHRITIS OF RIGHT KNEE: Primary | ICD-10-CM

## 2024-01-18 DIAGNOSIS — M25.561 CHRONIC PAIN OF RIGHT KNEE: ICD-10-CM

## 2024-01-18 DIAGNOSIS — G89.29 CHRONIC PAIN OF RIGHT KNEE: ICD-10-CM

## 2024-01-18 LAB
ALBUMIN SERPL BCP-MCNC: 4.1 G/DL (ref 3.5–5)
ALP SERPL-CCNC: 61 U/L (ref 34–104)
ALT SERPL W P-5'-P-CCNC: 35 U/L (ref 7–52)
ANION GAP SERPL CALCULATED.3IONS-SCNC: 11 MMOL/L
APTT PPP: 27 SECONDS (ref 23–37)
AST SERPL W P-5'-P-CCNC: 34 U/L (ref 13–39)
BASOPHILS # BLD AUTO: 0.04 THOUSANDS/ÂΜL (ref 0–0.1)
BASOPHILS NFR BLD AUTO: 1 % (ref 0–1)
BILIRUB SERPL-MCNC: 0.39 MG/DL (ref 0.2–1)
BUN SERPL-MCNC: 17 MG/DL (ref 5–25)
CALCIUM SERPL-MCNC: 9.4 MG/DL (ref 8.4–10.2)
CHLORIDE SERPL-SCNC: 105 MMOL/L (ref 96–108)
CO2 SERPL-SCNC: 25 MMOL/L (ref 21–32)
CREAT SERPL-MCNC: 0.83 MG/DL (ref 0.6–1.3)
EOSINOPHIL # BLD AUTO: 0.1 THOUSAND/ÂΜL (ref 0–0.61)
EOSINOPHIL NFR BLD AUTO: 2 % (ref 0–6)
ERYTHROCYTE [DISTWIDTH] IN BLOOD BY AUTOMATED COUNT: 13.5 % (ref 11.6–15.1)
EST. AVERAGE GLUCOSE BLD GHB EST-MCNC: 143 MG/DL
GFR SERPL CREATININE-BSD FRML MDRD: 73 ML/MIN/1.73SQ M
GLUCOSE SERPL-MCNC: 126 MG/DL (ref 65–140)
HBA1C MFR BLD: 6.6 %
HCT VFR BLD AUTO: 41.4 % (ref 34.8–46.1)
HGB BLD-MCNC: 13.1 G/DL (ref 11.5–15.4)
IMM GRANULOCYTES # BLD AUTO: 0.01 THOUSAND/UL (ref 0–0.2)
IMM GRANULOCYTES NFR BLD AUTO: 0 % (ref 0–2)
INR PPP: 0.98 (ref 0.84–1.19)
LYMPHOCYTES # BLD AUTO: 1.05 THOUSANDS/ÂΜL (ref 0.6–4.47)
LYMPHOCYTES NFR BLD AUTO: 22 % (ref 14–44)
MCH RBC QN AUTO: 28.7 PG (ref 26.8–34.3)
MCHC RBC AUTO-ENTMCNC: 31.6 G/DL (ref 31.4–37.4)
MCV RBC AUTO: 91 FL (ref 82–98)
MONOCYTES # BLD AUTO: 0.5 THOUSAND/ÂΜL (ref 0.17–1.22)
MONOCYTES NFR BLD AUTO: 11 % (ref 4–12)
NEUTROPHILS # BLD AUTO: 3 THOUSANDS/ÂΜL (ref 1.85–7.62)
NEUTS SEG NFR BLD AUTO: 64 % (ref 43–75)
NRBC BLD AUTO-RTO: 0 /100 WBCS
PLATELET # BLD AUTO: 272 THOUSANDS/UL (ref 149–390)
PMV BLD AUTO: 10.6 FL (ref 8.9–12.7)
POTASSIUM SERPL-SCNC: 4.6 MMOL/L (ref 3.5–5.3)
PROT SERPL-MCNC: 6.4 G/DL (ref 6.4–8.4)
PROTHROMBIN TIME: 12.9 SECONDS (ref 11.6–14.5)
RBC # BLD AUTO: 4.56 MILLION/UL (ref 3.81–5.12)
SODIUM SERPL-SCNC: 141 MMOL/L (ref 135–147)
WBC # BLD AUTO: 4.7 THOUSAND/UL (ref 4.31–10.16)

## 2024-01-18 PROCEDURE — 87147 CULTURE TYPE IMMUNOLOGIC: CPT

## 2024-01-18 PROCEDURE — 36415 COLL VENOUS BLD VENIPUNCTURE: CPT

## 2024-01-18 PROCEDURE — 85025 COMPLETE CBC W/AUTO DIFF WBC: CPT

## 2024-01-18 PROCEDURE — 85730 THROMBOPLASTIN TIME PARTIAL: CPT

## 2024-01-18 PROCEDURE — 87081 CULTURE SCREEN ONLY: CPT

## 2024-01-18 PROCEDURE — 85610 PROTHROMBIN TIME: CPT

## 2024-01-18 PROCEDURE — 80053 COMPREHEN METABOLIC PANEL: CPT

## 2024-01-18 PROCEDURE — 83036 HEMOGLOBIN GLYCOSYLATED A1C: CPT

## 2024-01-18 NOTE — TELEPHONE ENCOUNTER
Preoperative Elective Admission Assessment    Living Situation:    Who does pt live with: spouse  What kind of home: ranch  How do they enter the home: front  How many levels in home: 1   # of steps to enter home: 2  # of steps to second floor: n/a  Are there handrails: No  Are there landings: No  Sleeping arrangement: first/entry floor  Where is Bathroom: entry level  Where is the tub or shower: step in bath tub w/ grab bars, has shower chair  Dogs or ther pets: 2 dogs     First Floor Setup:   Is there a bathroom: Yes  Where would pt sleep: bed     DME: grab bars, rolling walker, and cane  We discussed clearing pathways in the home and making sure there is accessibly to use the walker, for example, removing throw rugs.      Patient's Current Level of Function: Ambulates: Independently and Ambulates with cane    Post-op Caregiver: spouse  Caregiver Name and phone number for Inpatient discharge needs: Terri  Currently receive any HHC/aides/community supports: No     Post-op Transport: spouse/unknown  To/from hospital: spouse  To/from PT 2-3x/week: unknown  Uses community transport now: No     Outpatient Physical Therapy Site:  Site: TBD  pre and post-op appts scheduled? No     Medication Management:self  pillbox  Preferred Pharmacy for Post-op Medications: [facility]  Blood Management Vitamin Regimen: Pt confirms taking as prescribed  Post-op anticoagulant: to be determined by surgical team postoperatively     DC Plan: Pt plans to be discharged home    Barriers to DC identified preoperatively: none identified    BMI: 28.96    Patient Education:  Pt educated on post-op pain, early mobilization (POD0), LOS goals, OP PT goals, and preoperative bathing. Patient educated that our goal is to appropriately discharge patient based off their post-op function while striving to maintain maximal independence. The goal is to discharge patient to home and for them to attend outpatient physical therapy.    Assigned to care  team? Yes    SW referral: Placed for inadequate transportation

## 2024-01-19 ENCOUNTER — OFFICE VISIT (OUTPATIENT)
Dept: URGENT CARE | Facility: CLINIC | Age: 69
End: 2024-01-19
Payer: COMMERCIAL

## 2024-01-19 VITALS
WEIGHT: 176.6 LBS | DIASTOLIC BLOOD PRESSURE: 78 MMHG | BODY MASS INDEX: 29.39 KG/M2 | RESPIRATION RATE: 20 BRPM | OXYGEN SATURATION: 97 % | SYSTOLIC BLOOD PRESSURE: 132 MMHG | HEART RATE: 105 BPM | TEMPERATURE: 97 F

## 2024-01-19 DIAGNOSIS — G89.29 CHRONIC PAIN OF RIGHT KNEE: ICD-10-CM

## 2024-01-19 DIAGNOSIS — M17.11 PRIMARY OSTEOARTHRITIS OF RIGHT KNEE: ICD-10-CM

## 2024-01-19 DIAGNOSIS — M25.561 CHRONIC PAIN OF RIGHT KNEE: ICD-10-CM

## 2024-01-19 DIAGNOSIS — K08.89 PAIN, DENTAL: Primary | ICD-10-CM

## 2024-01-19 PROCEDURE — 99213 OFFICE O/P EST LOW 20 MIN: CPT | Performed by: PREVENTIVE MEDICINE

## 2024-01-19 RX ORDER — AMOXICILLIN 500 MG/1
500 CAPSULE ORAL EVERY 12 HOURS SCHEDULED
Qty: 20 CAPSULE | Refills: 0 | Status: SHIPPED | OUTPATIENT
Start: 2024-01-19 | End: 2024-01-19

## 2024-01-19 RX ORDER — TRAMADOL HYDROCHLORIDE 50 MG/1
50 TABLET ORAL EVERY 6 HOURS PRN
Qty: 20 TABLET | Refills: 0 | Status: SHIPPED | OUTPATIENT
Start: 2024-01-19

## 2024-01-19 RX ORDER — AMOXICILLIN 500 MG/1
500 CAPSULE ORAL EVERY 8 HOURS SCHEDULED
Qty: 21 CAPSULE | Refills: 0 | Status: SHIPPED | OUTPATIENT
Start: 2024-01-19 | End: 2024-01-26

## 2024-01-19 RX ORDER — AMOXICILLIN 500 MG/1
500 CAPSULE ORAL EVERY 12 HOURS SCHEDULED
Qty: 20 CAPSULE | Refills: 0 | Status: SHIPPED | OUTPATIENT
Start: 2024-01-19 | End: 2024-01-19 | Stop reason: SDUPTHER

## 2024-01-19 RX ORDER — ASCORBIC ACID 500 MG
500 TABLET ORAL 3 TIMES DAILY
Qty: 21 TABLET | Refills: 0 | Status: SHIPPED | OUTPATIENT
Start: 2024-01-19 | End: 2024-01-26

## 2024-01-19 NOTE — PROGRESS NOTES
[facility]        NAME: Ina Tolbert is a 68 y.o. female  : 1955    MRN: [id number]  DATE: 2024  TIME: 12:55 PM    Assessment and Plan   Pain, dental [K08.89]  1. Pain, dental  traMADol (Ultram) 50 mg tablet    amoxicillin (AMOXIL) 500 mg capsule    DISCONTINUED: amoxicillin (AMOXIL) 500 mg capsule      2. Chronic pain of right knee  ascorbic acid (VITAMIN C) 500 MG tablet      3. Primary osteoarthritis of right knee  ascorbic acid (VITAMIN C) 500 MG tablet            Patient Instructions       Follow up with PCP in 3-5 days.  Proceed to  ER if symptoms worsen.    Chief Complaint     Chief Complaint   Patient presents with    Dental Pain     Started with pain on L molar yesteday  schedule for appt on 23 for root canal . Has temp crown . Did try to contact dentist was not able to get an appt. Last dose of t (2)ylenol 5:00am and 10:30am this morning.         History of Present Illness       Acute tooth pain left side of the mouth.  She is scheduled to have a root canal done as soon as she can get in with the endodontist.    Dental Pain         Review of Systems   Review of Systems   HENT:  Positive for dental problem.          Current Medications       Current Outpatient Medications:     acetaminophen (TYLENOL) 650 mg CR tablet, Take 1 tablet (650 mg total) by mouth every 8 (eight) hours as needed for headaches, moderate pain or mild pain, Disp: , Rfl: 0    albuterol (ProAir HFA) 90 mcg/act inhaler, Inhale 2 puffs every 6 (six) hours as needed for wheezing, Disp: 8.5 g, Rfl: 0    amoxicillin (AMOXIL) 500 mg capsule, Take 1 capsule (500 mg total) by mouth every 12 (twelve) hours for 10 days, Disp: 20 capsule, Rfl: 0    ascorbic acid (VITAMIN C) 500 MG tablet, Take 1 tablet (500 mg total) by mouth 3 (three) times a day for 7 days, Disp: 21 tablet, Rfl: 0    Calcium Carb-Cholecalciferol 600-12.5 MG-MCG CAPS, Take by mouth daily in the early morning, Disp: , Rfl:     calcium carbonate  (OS-WILLY) 1250 (500 Ca) MG tablet, Take 1,250 mg by mouth, Disp: , Rfl:     cholecalciferol (VITAMIN D3) 1,000 units tablet, Take 1 tablet (1,000 Units total) by mouth daily, Disp: 30 tablet, Rfl: 0    [employer] Aspirin Adult Low Strength 81 MG EC tablet, daily, Disp: , Rfl:     DULoxetine (CYMBALTA) 60 mg delayed release capsule, Take 1 capsule (60 mg total) by mouth daily, Disp: 90 capsule, Rfl: 1    esomeprazole (NexIUM) 40 MG capsule, TAKE 1 CAPSULE BY MOUTH EVERY DAY BEFORE BREAKFAST, Disp: 90 capsule, Rfl: 1    ferrous sulfate 324 (65 Fe) mg, Take 1 tablet (324 mg total) by mouth daily before breakfast, Disp: 30 tablet, Rfl: 0    folic acid (FOLVITE) 1 mg tablet, Take 1 tablet (1 mg total) by mouth daily, Disp: 30 tablet, Rfl: 0    gabapentin (NEURONTIN) 300 mg capsule, Take 1 capsule (300 mg total) by mouth 2 (two) times a day, Disp: 60 capsule, Rfl: 5    inFLIXimab (REMICADE) 100 mg, Inject into a catheter in a vein, Disp: , Rfl:     levothyroxine 50 mcg tablet, TAKE 1 TABLET BY MOUTH EVERY DAY, Disp: 90 tablet, Rfl: 1    Magnesium 400 MG TABS, Take 800 mg by mouth daily, Disp: , Rfl:     mesalamine (LIALDA) 1.2 g EC tablet, Take 4 tablets (4.8 g total) by mouth daily with breakfast, Disp: 120 tablet, Rfl: 2    QUEtiapine (SEROquel) 100 mg tablet, Take 1 tablet (100 mg total) by mouth daily at bedtime, Disp: 90 tablet, Rfl: 1    Sodium Fluoride 5000 PPM 1.1 % PSTE, USE ONCE DAILY PREFERABLY AT NIGHT, Disp: , Rfl:     traMADol (Ultram) 50 mg tablet, Take 1 tablet (50 mg total) by mouth every 6 (six) hours as needed for moderate pain, Disp: 20 tablet, Rfl: 0    zinc sulfate (ZINCATE) 220 mg capsule, Take 1 capsule (220 mg total) by mouth daily, Disp: 30 capsule, Rfl: 0    midodrine (PROAMATINE) 10 MG tablet, Take 1 tablet (10 mg total) by mouth in the morning Do not start before November 18, 2023., Disp: 30 tablet, Rfl: 0    sucralfate (CARAFATE) 1 g tablet, Take 1 tablet (1 g total) by mouth 2 (two) times a day  (Patient not taking: Reported on 12/11/2023), Disp: 60 tablet, Rfl: 0    valACYclovir (VALTREX) 1,000 mg tablet, Take 1 tablet (1,000 mg total) by mouth 3 (three) times a day for 7 days, Disp: 21 tablet, Rfl: 0    Current Allergies     Allergies as of 01/19/2024 - Reviewed 01/19/2024   Allergen Reaction Noted    Meperidine Lightheadedness and Other (See Comments) 01/11/2006    Sulfa antibiotics Hives 01/11/2006            The following portions of the patient's history were reviewed and updated as appropriate: allergies, current medications, past family history, past medical history, past social history, past surgical history and problem list.     Past Medical History:   Diagnosis Date    Abdominal adhesions     Anesthesia complication     difficult to wake up    Anxiety     Arthritis     Cancer (HCC)     melanoma    Colon polyp     Crohn's disease (HCC)     Depression     Depression     Disease of thyroid gland     Fibromyalgia     Fibromyalgia, primary     Fractures 2006    GERD (gastroesophageal reflux disease)     History of cholecystectomy 09/07/2019    Hyperlipidemia     Irregular heart beat     can be tachy; also has orthoststic hypotension    Lazy eye     resolved: 3/27/17    Medical clearance for psychiatric admission 07/13/2021    Melanoma (HCC) 2010    Mild asthma 11/04/2020    Osteoarthritis 07/2018    Osteopenia     with joint pain-elevated DEV    Psychiatric disorder     Shortness of breath     assoc with tachycardia    Stomach disorder 1995    Tinnitus     Wears glasses     for reading       Past Surgical History:   Procedure Laterality Date    ABDOMINAL SURGERY      lysis of adhesions x 2    APPENDECTOMY      CERVICAL FUSION      May 5,2021 and Jan. 01,2020    CHOLECYSTECTOMY      open    COLONOSCOPY      DILATION AND CURETTAGE OF UTERUS      FOOT SURGERY  05/2017    NECK SURGERY  01/2019 5/2021    NEUROMA EXCISION Right 05/26/2017    Procedure: EXCISION MASS / FIBROMA FOOT;  Surgeon: Jorden Crespo,  DPM;  Location: [facility];  Service:     PARS PLANA VITRECTOMY W/ REPAIR OF MACULAR HOLE  12/23/2020    MO XCAPSL CTRC RMVL INSJ IO LENS PROSTH W/O ECP Left 12/06/2021    Procedure: EXTRACTION EXTRACAPSULAR CATARACT PHACO INTRAOCULAR LENS (IOL);  Surgeon: Mandeep Chavez MD;  Location: [facility] MAIN OR;  Service: Ophthalmology    RIGHT OOPHORECTOMY      WISDOM TOOTH EXTRACTION      x4       Family History   Problem Relation Age of Onset    Heart disease Mother     Stroke Mother 62    COPD Mother     Arthritis Mother     Hypertension Father     Dislocations Sister     Multiple sclerosis Sister     Neurological problems Sister     Scoliosis Sister     No Known Problems Maternal Grandmother     No Known Problems Maternal Grandfather     No Known Problems Paternal Grandmother     No Known Problems Paternal Grandfather     Kidney disease Brother         kidney transplant    No Known Problems Maternal Aunt     No Known Problems Maternal Uncle     No Known Problems Paternal Aunt     No Known Problems Paternal Uncle     ADD / ADHD Neg Hx     Anesthesia problems Neg Hx     Cancer Neg Hx     Clotting disorder Neg Hx     Collagen disease Neg Hx     Diabetes Neg Hx     Dislocations Neg Hx     Learning disabilities Neg Hx     Neurological problems Neg Hx     Osteoporosis Neg Hx     Rheumatologic disease Neg Hx     Scoliosis Neg Hx     Vascular Disease Neg Hx          Medications have been verified.        Objective   /78   Pulse 105   Temp (!) 97 °F (36.1 °C) (Tympanic)   Resp 20   Wt 80.1 kg (176 lb 9.6 oz)   LMP  (LMP Unknown)   SpO2 97%   BMI 29.39 kg/m²   No LMP recorded (lmp unknown). Patient is postmenopausal.       Physical Exam     Physical Exam  HENT:      Head:      Comments: Great difficulty opening her mouth.  Pain over the left lower gum

## 2024-01-20 LAB
MRSA NOSE QL CULT: ABNORMAL
MRSA NOSE QL CULT: ABNORMAL

## 2024-01-22 ENCOUNTER — ANESTHESIA EVENT (OUTPATIENT)
Dept: PERIOP | Facility: HOSPITAL | Age: 69
End: 2024-01-22
Payer: COMMERCIAL

## 2024-01-22 DIAGNOSIS — Z22.322 MRSA NASAL COLONIZATION: Primary | ICD-10-CM

## 2024-01-22 DIAGNOSIS — F33.41 MAJOR DEPRESSIVE DISORDER, RECURRENT, IN PARTIAL REMISSION (HCC): ICD-10-CM

## 2024-01-22 DIAGNOSIS — M17.11 PRIMARY OSTEOARTHRITIS OF RIGHT KNEE: ICD-10-CM

## 2024-01-22 RX ORDER — MELATONIN
1000 DAILY
Status: ON HOLD | COMMUNITY
End: 2024-02-06

## 2024-01-22 RX ORDER — DULOXETIN HYDROCHLORIDE 60 MG/1
60 CAPSULE, DELAYED RELEASE ORAL DAILY
Qty: 90 CAPSULE | Refills: 1 | Status: SHIPPED | OUTPATIENT
Start: 2024-01-22

## 2024-01-22 NOTE — PRE-PROCEDURE INSTRUCTIONS
Pre-Surgery Instructions:   Medication Instructions    acetaminophen (TYLENOL) 650 mg CR tablet Hold day of surgery.    albuterol (ProAir HFA) 90 mcg/act inhaler Use day of surgery if needed and bring with you      amoxicillin (AMOXIL) 500 mg capsule For infected tooth will be completed before surgery    ascorbic acid (VITAMIN C) 500 MG tablet Hold day of surgery      Calcium Carb-Cholecalciferol 600-12.5 MG-MCG CAPS Hold this medication for 7 days before surgery      cholecalciferol (VITAMIN D3) 1,000 units tablet Hold day of surgery      [employer] Aspirin Adult Low Strength 81 MG EC tablet Hold this medication for 7 days before surgery      DULoxetine (CYMBALTA) 60 mg delayed release capsule Take this medication day of surgery if normally taken in the morning.      esomeprazole (NexIUM) 40 MG capsule Take this medication day of surgery if normally taken in the morning.      ferrous sulfate 324 (65 Fe) mg Hold day of surgery      folic acid (FOLVITE) 1 mg tablet Hold day of surgery      gabapentin (NEURONTIN) 300 mg capsule Take this medication day of surgery if normally taken in the morning.      levothyroxine 50 mcg tablet Take this medication day of surgery if normally taken in the morning.      Magnesium 400 MG TABS Hold this medication for 7 days before surgery      mesalamine (LIALDA) 1.2 g EC tablet Hold this medication for 7 days before surgery      midodrine (PROAMATINE) 10 MG tablet Take this medication day of surgery if normally taken in the morning.      QUEtiapine (SEROquel) 100 mg tablet Normally takes at night.      Sodium Fluoride 5000 PPM 1.1 % PSTE Take this medication day of surgery if normally taken in the morning.      traMADol (Ultram) 50 mg tablet Hold day of surgery      zinc sulfate (ZINCATE) 220 mg capsule Hold day of surgery      Medication instructions for day surgery reviewed. Please use only a sip of water to take your instructed medications. Avoid all over the counter vitamins, supplements  and NSAIDS for one week prior to surgery per anesthesia guidelines. Tylenol is ok to take as needed.     You will receive a call one business day prior to surgery with an arrival time and hospital directions. If your surgery is scheduled on a Monday, the hospital will be calling you on the Friday prior to your surgery. If you have not heard from anyone by 8pm, please call the hospital supervisor through the hospital  at [phone]. ([place] [phone]).    Do not eat or drink anything after midnight the night before your surgery, including candy, mints, lifesavers, or chewing gum. Do not drink alcohol 24hrs before your surgery. Try not to smoke at least 24hrs before your surgery.       Follow the pre surgery showering instructions as listed in the “My Surgical Experience Booklet” or otherwise provided by your surgeon's office. Do not use a blade to shave the surgical area 1 week before surgery. It is okay to use a clean electric clippers up to 24 hours before surgery. Do not apply any lotions, creams, including makeup, cologne, deodorant, or perfumes after showering on the day of your surgery. Do not use dry shampoo, hair spray, hair gel, or any type of hair products.     No contact lenses, eye make-up, or artificial eyelashes. Remove nail polish, including gel polish, and any artificial, gel, or acrylic nails if possible. Remove all jewelry including rings and body piercing jewelry.     Wear causal clothing that is easy to take on and off. Consider your type of surgery.    Keep any valuables, jewelry, piercings at home. Please bring any specially ordered equipment (sling, braces) if indicated.    Arrange for a responsible person to drive you to and from the hospital on the day of your surgery. Visitor Guidelines discussed.     Call the surgeon's office with any new illnesses, exposures, or additional questions prior to surgery.    Please reference your “My Surgical Experience Booklet” for additional  information to prepare for your upcoming surgery.

## 2024-01-24 ENCOUNTER — CONSULT (OUTPATIENT)
Dept: FAMILY MEDICINE CLINIC | Facility: CLINIC | Age: 69
End: 2024-01-24
Payer: COMMERCIAL

## 2024-01-24 ENCOUNTER — HOSPITAL ENCOUNTER (OUTPATIENT)
Dept: PULMONOLOGY | Facility: HOSPITAL | Age: 69
Discharge: HOME | End: 2024-01-24
Payer: COMMERCIAL

## 2024-01-24 ENCOUNTER — PATIENT OUTREACH (OUTPATIENT)
Dept: OBGYN CLINIC | Facility: HOSPITAL | Age: 69
End: 2024-01-24

## 2024-01-24 VITALS
HEART RATE: 100 BPM | BODY MASS INDEX: 28.66 KG/M2 | WEIGHT: 172 LBS | SYSTOLIC BLOOD PRESSURE: 120 MMHG | RESPIRATION RATE: 16 BRPM | HEIGHT: 65 IN | TEMPERATURE: 97.5 F | DIASTOLIC BLOOD PRESSURE: 78 MMHG

## 2024-01-24 DIAGNOSIS — J47.9 BRONCHIECTASIS WITHOUT COMPLICATION (HCC): ICD-10-CM

## 2024-01-24 DIAGNOSIS — C80.1 CANCER (HCC): ICD-10-CM

## 2024-01-24 DIAGNOSIS — M17.11 PRIMARY OSTEOARTHRITIS OF RIGHT KNEE: ICD-10-CM

## 2024-01-24 DIAGNOSIS — R73.9 BLOOD GLUCOSE ELEVATED: Primary | ICD-10-CM

## 2024-01-24 DIAGNOSIS — M35.3 POLYMYALGIA (HCC): ICD-10-CM

## 2024-01-24 DIAGNOSIS — F10.21 ALCOHOL DEPENDENCE IN REMISSION (HCC): ICD-10-CM

## 2024-01-24 DIAGNOSIS — J20.9 ACUTE BRONCHITIS: ICD-10-CM

## 2024-01-24 DIAGNOSIS — F33.2 SEVERE EPISODE OF RECURRENT MAJOR DEPRESSIVE DISORDER, WITHOUT PSYCHOTIC FEATURES (HCC): ICD-10-CM

## 2024-01-24 DIAGNOSIS — G89.29 CHRONIC PAIN OF RIGHT KNEE: ICD-10-CM

## 2024-01-24 DIAGNOSIS — K50.80 CROHN'S DISEASE OF BOTH SMALL AND LARGE INTESTINE WITHOUT COMPLICATION (HCC): ICD-10-CM

## 2024-01-24 DIAGNOSIS — M25.561 CHRONIC PAIN OF RIGHT KNEE: ICD-10-CM

## 2024-01-24 DIAGNOSIS — M21.061 ACQUIRED VALGUS DEFORMITY KNEE, RIGHT: ICD-10-CM

## 2024-01-24 DIAGNOSIS — I95.1 ORTHOSTATIC HYPOTENSION: ICD-10-CM

## 2024-01-24 DIAGNOSIS — Z01.818 PREOP EXAMINATION: ICD-10-CM

## 2024-01-24 DIAGNOSIS — R93.89 ABNORMAL CT OF THE CHEST: ICD-10-CM

## 2024-01-24 PROCEDURE — 94729 DIFFUSING CAPACITY: CPT | Performed by: STUDENT IN AN ORGANIZED HEALTH CARE EDUCATION/TRAINING PROGRAM

## 2024-01-24 PROCEDURE — 94726 PLETHYSMOGRAPHY LUNG VOLUMES: CPT | Performed by: STUDENT IN AN ORGANIZED HEALTH CARE EDUCATION/TRAINING PROGRAM

## 2024-01-24 PROCEDURE — 99214 OFFICE O/P EST MOD 30 MIN: CPT | Performed by: NURSE PRACTITIONER

## 2024-01-24 PROCEDURE — 94760 N-INVAS EAR/PLS OXIMETRY 1: CPT

## 2024-01-24 PROCEDURE — 94060 EVALUATION OF WHEEZING: CPT

## 2024-01-24 PROCEDURE — 94060 EVALUATION OF WHEEZING: CPT | Performed by: STUDENT IN AN ORGANIZED HEALTH CARE EDUCATION/TRAINING PROGRAM

## 2024-01-24 PROCEDURE — 94726 PLETHYSMOGRAPHY LUNG VOLUMES: CPT

## 2024-01-24 PROCEDURE — 94729 DIFFUSING CAPACITY: CPT

## 2024-01-24 RX ORDER — ALBUTEROL SULFATE 2.5 MG/3ML
2.5 SOLUTION RESPIRATORY (INHALATION) ONCE
Status: COMPLETED | OUTPATIENT
Start: 2024-01-24 | End: 2024-01-24

## 2024-01-24 RX ADMIN — ALBUTEROL SULFATE 2.5 MG: 2.5 SOLUTION RESPIRATORY (INHALATION) at 07:44

## 2024-01-24 NOTE — H&P (VIEW-ONLY)
[facility] PRE-OPERATIVE EVALUATION  [facility]    NAME: Ina Tolbert  AGE: 68 y.o. SEX: female  : 1955     DATE: 2024    [facility] Pre-Operative Evaluation      Chief Complaint: Pre-operative Evaluation     Surgery: right knee total arthroplasty  Anticipated Date of Surgery: 24  Surgeon: Dr. Kim       History of Present Illness:     Here today for preop clearance prior to upcoming surgery.  Labs and cardiac clearance has been obtained.  She is feeling well, no concerns.           Anesthesia:  spinal and IV sedation  Bleeding Risk: no recent abnormal bleeding, no remote history of abnormal bleeding, and no use of Ca-channel blockers  Current Anti-platelet/anticoagulation medication: Aspirin    Assessment of Cardiac Risk:  Denies unstable or severe angina or MI in the last 6 weeks or history of stent placement in the last year   Denies decompensated heart failure (e.g. New onset heart failure, NYHA functional class IV heart failure, or worsening existing heart failure)  Denies significant arrhythmias such as high grade AV block, symptomatic ventricular arrhythmia, newly recognized ventricular tachycardia, supraventricular tachycardia with resting heart rate >100, or symptomatic bradycardia  Denies severe heart valve disease including aortic stenosis or symptomatic mitral stenosis     Exercise Capacity:  Able to walk 4 blocks without symptoms?: Limited d/t orthopedic issues   Able to walk 2 flights without symptoms?:Limited d/t orthopedic issues.    Prior Anesthesia Reactions: No     Personal history of venous thromboembolic disease? No    History of steroid use for >2 weeks within last year? Yes         Review of Systems:     Review of Systems   Constitutional:  Negative for chills, fatigue and fever.   Respiratory:  Negative for cough, shortness of breath and wheezing.    Cardiovascular:  Negative for chest pain, palpitations and leg swelling.    Gastrointestinal:  Negative for abdominal pain, diarrhea, nausea and vomiting.   Skin:  Negative for rash.   Neurological:  Negative for dizziness and headaches.       Current Problem List:     Patient Active Problem List   Diagnosis   • Alcohol dependence in remission ([facility])   • Allergic rhinitis   • Arthropathy of multiple sites   • Severe episode of recurrent major depressive disorder, without psychotic features ([facility])   • Irritable bowel syndrome   • Raynaud's syndrome without gangrene   • Osteopenia   • Radiculopathy of lumbar region   • 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[facility])   • Focal neurological deficit   • Gastroesophageal reflux disease with esophagitis without hemorrhage   • Cervical myelopathy with cervical radiculopathy    • History of recent intraspinal surgery   • Orthostatic hypotension   • History of migraine headaches   • Mixed dyslipidemia   • Lesion of lachelle   • Right sided temporal headache   • Fibromyalgia   • Generalized anxiety disorder   • Herniated cervical disc   • History of melanoma   • Cervical spinal stenosis   • BMI 27.0-27.9,adult   • Psychogenic weakness   • History of cholecystectomy   • Ambulatory dysfunction   • Adult hypothyroidism   • Dizziness and giddiness   • Migraine without aura and without status migrainosus, not intractable   • History of vertigo   • Epigastric abdominal pain   • Generalized abdominal pain   • Leg weakness, bilateral   • Mild asthma   • Peripheral neuropathy   • Medical clearance for psychiatric admission   • PTSD (post-traumatic stress disorder)   • Osteoarthritis of knee   • Major depressive disorder, recurrent, in partial remission ([facility])   • Gambling disorder, moderate   • Primary insomnia   • Insomnia related to another mental disorder   • Recurrent major depressive disorder, in full remission ([facility])   • Marital conflict   • Anxiety and depression   • Cancer ([facility])   • Cervical spondylosis without myelopathy   • Glaucoma   • Major depression, single episode   •  Osteoporosis   • Acute metabolic encephalopathy   • TIA (transient ischemic attack)   • Depression   • Diarrhea   • HTN (hypertension)   • Hyperlipidemia   • Crohn's disease (HCC)   • Subchondral insufficiency fracture of condyle of right femur (HCC)   • Muscle cramping   • SOB (shortness of breath) on exertion   • Elevated LFTs   • Abnormal CT of the chest   • Lumbar spinal stenosis   • Bronchiectasis without complication ([facility])       Allergies:     Allergies   Allergen Reactions   • Meperidine Lightheadedness and Other (See Comments)   • Sulfa Antibiotics Hives       Current Medications:       Current Outpatient Medications:   •  acetaminophen (TYLENOL) 650 mg CR tablet, Take 1 tablet (650 mg total) by mouth every 8 (eight) hours as needed for headaches, moderate pain or mild pain, Disp: , Rfl: 0  •  albuterol (ProAir HFA) 90 mcg/act inhaler, Inhale 2 puffs every 6 (six) hours as needed for wheezing, Disp: 8.5 g, Rfl: 0  •  amoxicillin (AMOXIL) 500 mg capsule, Take 1 capsule (500 mg total) by mouth every 8 (eight) hours for 7 days, Disp: 21 capsule, Rfl: 0  •  ascorbic acid (VITAMIN C) 500 MG tablet, Take 1 tablet (500 mg total) by mouth 3 (three) times a day for 7 days, Disp: 21 tablet, Rfl: 0  •  Calcium Carb-Cholecalciferol 600-12.5 MG-MCG CAPS, Take by mouth daily in the early morning, Disp: , Rfl:   •  cholecalciferol (VITAMIN D3) 1,000 units tablet, Take 1,000 Units by mouth daily, Disp: , Rfl:   •  [employer] Aspirin Adult Low Strength 81 MG EC tablet, daily, Disp: , Rfl:   •  DULoxetine (CYMBALTA) 60 mg delayed release capsule, TAKE 1 CAPSULE BY MOUTH EVERY DAY, Disp: 90 capsule, Rfl: 1  •  esomeprazole (NexIUM) 40 MG capsule, TAKE 1 CAPSULE BY MOUTH EVERY DAY BEFORE BREAKFAST, Disp: 90 capsule, Rfl: 1  •  ferrous sulfate 324 (65 Fe) mg, Take 1 tablet (324 mg total) by mouth daily before breakfast, Disp: 30 tablet, Rfl: 0  •  folic acid (FOLVITE) 1 mg tablet, Take 1 tablet (1 mg total) by mouth daily, Disp: 30  tablet, Rfl: 0  •  gabapentin (NEURONTIN) 300 mg capsule, Take 1 capsule (300 mg total) by mouth 2 (two) times a day, Disp: 60 capsule, Rfl: 5  •  levothyroxine 50 mcg tablet, TAKE 1 TABLET BY MOUTH EVERY DAY, Disp: 90 tablet, Rfl: 1  •  Magnesium 400 MG TABS, Take 800 mg by mouth daily, Disp: , Rfl:   •  mesalamine (LIALDA) 1.2 g EC tablet, Take 4 tablets (4.8 g total) by mouth daily with breakfast, Disp: 120 tablet, Rfl: 2  •  midodrine (PROAMATINE) 10 MG tablet, Take 1 tablet (10 mg total) by mouth in the morning Do not start before November 18, 2023. (Patient taking differently: Take 5 mg by mouth in the morning), Disp: 30 tablet, Rfl: 0  •  mupirocin (BACTROBAN) 2 % ointment, Apply topically 2 (two) times a day for 5 days Apply twice daily in each nares for the 5 days before surgery, Disp: 22 g, Rfl: 0  •  QUEtiapine (SEROquel) 100 mg tablet, Take 1 tablet (100 mg total) by mouth daily at bedtime, Disp: 90 tablet, Rfl: 1  •  Sodium Fluoride 5000 PPM 1.1 % PSTE, USE ONCE DAILY PREFERABLY AT NIGHT, Disp: , Rfl:   •  traMADol (Ultram) 50 mg tablet, Take 1 tablet (50 mg total) by mouth every 6 (six) hours as needed for moderate pain, Disp: 20 tablet, Rfl: 0  •  zinc sulfate (ZINCATE) 220 mg capsule, Take 1 capsule (220 mg total) by mouth daily, Disp: 30 capsule, Rfl: 0  No current facility-administered medications for this visit.    Past Medical History:       Past Medical History:   Diagnosis Date   • Abdominal adhesions    • Anesthesia complication     difficult to wake up   • Anxiety    • Arthritis    • Asthma     with exertion   • Cancer (HCC)     melanoma   • Colon polyp    • Crohn's disease (HCC)    • Depression    • Depression    • Disease of thyroid gland    • Fibromyalgia    • Fibromyalgia, primary    • Fractures 2006   • GERD (gastroesophageal reflux disease)    • History of cholecystectomy 09/07/2019   • Hyperlipidemia    • Irregular heart beat     can be tachy; also has orthoststic hypotension   • Lazy  eye     resolved: 3/27/17   • Medical clearance for psychiatric admission 07/13/2021   • Melanoma (HCC) 2010   • Mild asthma 11/04/2020   • Osteoarthritis 07/2018   • Osteopenia     with joint pain-elevated DEV   • Polymyalgia (HCC)    • Psychiatric disorder    • Shortness of breath     assoc with tachycardia   • Stomach disorder 1995   • Tinnitus    • Wears glasses     for reading        Past Surgical History:   Procedure Laterality Date   • ABDOMINAL SURGERY      lysis of adhesions x 2   • APPENDECTOMY     • CERVICAL FUSION      May 5,2021 and Jan. 01,2020   • CHOLECYSTECTOMY      open   • COLONOSCOPY     • DILATION AND CURETTAGE OF UTERUS     • FOOT SURGERY  05/2017   • NECK SURGERY  01/2019 5/2021   • NEUROMA EXCISION Right 05/26/2017    Procedure: EXCISION MASS / FIBROMA FOOT;  Surgeon: Jorden Crespo DPM;  Location: [facility] MAIN OR;  Service:    • PARS PLANA VITRECTOMY W/ REPAIR OF MACULAR HOLE  12/23/2020   • IL XCAPSL CTRC RMVL INSJ IO LENS PROSTH W/O ECP Left 12/06/2021    Procedure: EXTRACTION EXTRACAPSULAR CATARACT PHACO INTRAOCULAR LENS (IOL);  Surgeon: Mandeep Chavez MD;  Location: [facility] MAIN OR;  Service: Ophthalmology   • RIGHT OOPHORECTOMY     • WISDOM TOOTH EXTRACTION      x4        Family History   Problem Relation Age of Onset   • Heart disease Mother    • Stroke Mother 62   • COPD Mother    • Arthritis Mother    • Hypertension Father    • Dislocations Sister    • Multiple sclerosis Sister    • Neurological problems Sister    • Scoliosis Sister    • No Known Problems Maternal Grandmother    • No Known Problems Maternal Grandfather    • No Known Problems Paternal Grandmother    • No Known Problems Paternal Grandfather    • Kidney disease Brother         kidney transplant   • No Known Problems Maternal Aunt    • No Known Problems Maternal Uncle    • No Known Problems Paternal Aunt    • No Known Problems Paternal Uncle    • ADD / ADHD Neg Hx    • Anesthesia problems Neg Hx    • Cancer Neg Hx    •  "Clotting disorder Neg Hx    • Collagen disease Neg Hx    • Diabetes Neg Hx    • Dislocations Neg Hx    • Learning disabilities Neg Hx    • Neurological problems Neg Hx    • Osteoporosis Neg Hx    • Rheumatologic disease Neg Hx    • Scoliosis Neg Hx    • Vascular Disease Neg Hx         Social History     Socioeconomic History   • Marital status: /Civil Union     Spouse name: Not on file   • Number of children: Not on file   • Years of education: Not on file   • Highest education level: Not on file   Occupational History   • Not on file   Tobacco Use   • Smoking status: Never     Passive exposure: Never   • Smokeless tobacco: Never   Vaping Use   • Vaping status: Never Used   Substance and Sexual Activity   • Alcohol use: Not Currently   • Drug use: No   • Sexual activity: Not Currently     Partners: Male   Other Topics Concern   • Not on file   Social History Narrative   • Not on file     Social Determinants of Health     Financial Resource Strain: Not on file   Food Insecurity: No Food Insecurity (11/14/2023)    Hunger Vital Sign    • Worried About Running Out of Food in the Last Year: Never true    • Ran Out of Food in the Last Year: Never true   Transportation Needs: No Transportation Needs (11/14/2023)    PRAPARE - Transportation    • Lack of Transportation (Medical): No    • Lack of Transportation (Non-Medical): No   Physical Activity: Not on file   Stress: Not on file   Social Connections: Not on file   Intimate Partner Violence: Not on file   Housing Stability: Low Risk  (11/14/2023)    Housing Stability Vital Sign    • Unable to Pay for Housing in the Last Year: No    • Number of Places Lived in the Last Year: 1    • Unstable Housing in the Last Year: No        Physical Exam:     /78   Pulse 100   Temp 97.5 °F (36.4 °C)   Resp 16   Ht 5' 5\" (1.651 m) Comment: declined removing her shoes  Wt 78 kg (172 lb)   LMP  (LMP Unknown)   BMI 28.62 kg/m²     Physical Exam  Vitals and nursing note " reviewed.   Constitutional:       Appearance: Normal appearance. She is well-developed.   HENT:      Right Ear: Tympanic membrane normal.      Left Ear: Tympanic membrane normal.   Eyes:      Conjunctiva/sclera: Conjunctivae normal.   Cardiovascular:      Rate and Rhythm: Normal rate and regular rhythm.      Pulses: Normal pulses.      Heart sounds: Normal heart sounds. No murmur heard.  Pulmonary:      Effort: Pulmonary effort is normal.      Breath sounds: Normal breath sounds.   Abdominal:      General: Bowel sounds are normal.   Skin:     General: Skin is warm and dry.   Neurological:      Mental Status: She is alert.   Psychiatric:         Mood and Affect: Mood normal.         Behavior: Behavior normal.          Data:     Pre-operative work-up    Laboratory Results: I have personally reviewed the pertinent laboratory results/reports      EKG: I have personally reviewed pertinent reports.      Recent Results (from the past 672 hour(s))   IgG 1, 2, 3, and 4    Collection Time: 01/10/24  9:31 AM   Result Value Ref Range    IgG 714 586 - 1,602 mg/dL    IgG 1 436 248 - 810 mg/dL    IgG 2 201 130 - 555 mg/dL    IgG 3 59 15 - 102 mg/dL    IgG 4 25 2 - 96 mg/dL   IgG, IgA, IgM    Collection Time: 01/10/24  9:31 AM   Result Value Ref Range    IgA 218 87 - 352 mg/dL     26 - 217 mg/dL   Allergen, Respiratory Zone 1 Profile    Collection Time: 01/10/24  9:31 AM   Result Value Ref Range    Class Description Comment     Immunoglobulin E, Total <2 (L) 6 - 495 IU/mL    D.PTERONYSSINUS IGE <0.10 Class 0 kU/L    D. FARINAE <0.10 Class 0 kU/L    Cat Dander <0.10 Class 0 kU/L    DOG DANDER IGE <0.10 Class 0 kU/L    BERMUDA GRASS IGE <0.10 Class 0 kU/L    VAMSI GRASS <0.10 Class 0 kU/L    COCKROACH, Lithuanian <0.10 Class 0 kU/L    PENICILLIUM CHRYSOGENUM <0.10 Class 0 kU/L    Cladosporium Herbarum <0.10 Class 0 kU/L    Aspergillus fumigatus <0.10 Class 0 kU/L    Alternaria Alternata <0.10 Class 0 kU/L    MAPLE/BOX ELDER IGE  <0.10 Class 0 kU/L    Birch <0.10 Class 0 kU/L    MOUNTAIN CEDAR <0.10 Class 0 kU/L    T007 [place], White <0.10 Class 0 kU/L    [place] <0.10 Class 0 kU/L    WAL[place]UT <0.10 Class 0 kU/L    [place] MAPLE LEAF <0.10 Class 0 kU/L    [place] <0.10 Class 0 kU/L    WHITE MARITZA <0.10 Class 0 kU/L    T070 White [place] <0.10 Class 0 kU/L    Short Ragwd(A kevin.) IgE <0.10 Class 0 kU/L    MUGWORT IGE <0.10 Class 0 kU/L    COMMON PIGWEED <0.10 Class 0 kU/L    SHEEP SORREL <0.10 Class 0 kU/L    Mouse Urine Proteins <0.10 Class 0 kU/L   Comprehensive metabolic panel    Collection Time: 01/18/24  8:41 AM   Result Value Ref Range    Sodium 141 135 - 147 mmol/L    Potassium 4.6 3.5 - 5.3 mmol/L    Chloride 105 96 - 108 mmol/L    CO2 25 21 - 32 mmol/L    ANION GAP 11 mmol/L    BUN 17 5 - 25 mg/dL    Creatinine 0.83 0.60 - 1.30 mg/dL    Glucose 126 65 - 140 mg/dL    Calcium 9.4 8.4 - 10.2 mg/dL    AST 34 13 - 39 U/L    ALT 35 7 - 52 U/L    Alkaline Phosphatase 61 34 - 104 U/L    Total Protein 6.4 6.4 - 8.4 g/dL    Albumin 4.1 3.5 - 5.0 g/dL    Total Bilirubin 0.39 0.20 - 1.00 mg/dL    eGFR 73 ml/min/1.73sq m   CBC and differential    Collection Time: 01/18/24  8:41 AM   Result Value Ref Range    WBC 4.70 4.31 - 10.16 Thousand/uL    RBC 4.56 3.81 - 5.12 Million/uL    Hemoglobin 13.1 11.5 - 15.4 g/dL    Hematocrit 41.4 34.8 - 46.1 %    MCV 91 82 - 98 fL    MCH 28.7 26.8 - 34.3 pg    MCHC 31.6 31.4 - 37.4 g/dL    RDW 13.5 11.6 - 15.1 %    MPV 10.6 8.9 - 12.7 fL    Platelets 272 149 - 390 Thousands/uL    nRBC 0 /100 WBCs    Neutrophils Relative 64 43 - 75 %    Immat GRANS % 0 0 - 2 %    Lymphocytes Relative 22 14 - 44 %    Monocytes Relative 11 4 - 12 %    Eosinophils Relative 2 0 - 6 %    Basophils Relative 1 0 - 1 %    Neutrophils Absolute 3.00 1.85 - 7.62 Thousands/µL    Immature Grans Absolute 0.01 0.00 - 0.20 Thousand/uL    Lymphocytes Absolute 1.05 0.60 - 4.47 Thousands/µL    Monocytes Absolute 0.50 0.17 - 1.22 Thousand/µL     Eosinophils Absolute 0.10 0.00 - 0.61 Thousand/µL    Basophils Absolute 0.04 0.00 - 0.10 Thousands/µL   Protime-INR    Collection Time: 01/18/24  8:41 AM   Result Value Ref Range    Protime 12.9 11.6 - 14.5 seconds    INR 0.98 0.84 - 1.19   APTT    Collection Time: 01/18/24  8:41 AM   Result Value Ref Range    PTT 27 23 - 37 seconds   MRSA culture    Collection Time: 01/18/24  8:41 AM    Specimen: Nose; Nares   Result Value Ref Range    MRSA Culture Only (A)      Methicillin Resistant Staphylococcus aureus isolated    MRSA Culture Only       This patient requires contact isolation precautions per [place] law. Contact precautions are not required in [place] for nasal surveillance cultures.   Hemoglobin A1C    Collection Time: 01/18/24  8:41 AM   Result Value Ref Range    Hemoglobin A1C 6.6 (H) Normal 4.0-5.6%; PreDiabetic 5.7-6.4%; Diabetic >=6.5%; Glycemic control for adults with diabetes <7.0% %     mg/dl           Assessment & Recommendations:     1. Blood glucose elevated  Comments:  likely d/t prolonged Prednisone.  Recommended repeating labs in 4 months    2. Preop examination  -     Ambulatory referral to Family Practice    3. Primary osteoarthritis of right knee  -     Ambulatory referral to Family Practice    4. Chronic pain of right knee  -     Ambulatory referral to Family Practice    5. Acquired valgus deformity knee, right  -     Ambulatory referral to Family Practice    6. Orthostatic hypotension  Assessment & Plan:  S/p loop recorder, management per cardiology       7. Crohn's disease of both small and large intestine without complication ([facility])  Assessment & Plan:  Recently completed Prednisone taper, management per GI      8. Polymyalgia ([facility])    9. Severe episode of recurrent major depressive disorder, without psychotic features ([facility])  Assessment & Plan:  Management per psychiatry       10. Alcohol dependence in remission ([facility])  Assessment & Plan:  Continues to abstain      11.  Cancer (HCC)          Pre-Op Evaluation Assessment  68 y.o. female with planned surgery: as above.    Known risk factors for perioperative complications: None.        Current medications which may produce withdrawal symptoms if withheld perioperatively: none.    Pre-Op Evaluation Plan  1. Further preoperative workup as follows:   - None; no further preoperative work-up is required    2. Medication Management/Recommendations:   - Patient has been instructed to avoid herbs or non-directed vitamins the week prior to surgery to ensure no drug interactions with perioperative surgical and anesthetic medications.  - Patient has been instructed to avoid aspirin containing medications or non-steroidal anti-inflammatory drugs for the week preceding surgery.    3. Prophylaxis for cardiac events with perioperative beta-blockers: not indicated.    4. Patient requires further consultation with: None    Clearance  Patient is CLEARED for surgery without any additional cardiac testing.    Cardiac clearance per Dr. Kim, note reviewed in pt's medical record.     Dorinda Davis [facility]  [address] [address]  Phone#  [phone]  Fax#  [phone]

## 2024-01-24 NOTE — PROGRESS NOTES
FARIHA received a new referral on 01/18/2024 in regard to pt scheduled for arthroplasty of right knee on 02/06/2024. FARIHACM reviewed NN notes prior to contacting pt. Pt lives with her spouse in a ranch style home. Pt ambulates independently with a cane and is independent with ADLs. Pts spouse will provide caregiver support to pt and will take pt to and from surgery. Pt has no transportation planned to get to and from OP PT.   SWCM contacted pt today and introduced self and role. Pt did confirm that her spouse will provide caregiver postoperatively. Pts spouse will also be taking pt to and from surgery. Pts concern is transportation to and from OP PT. Today pt and FARIHA discussed [facility] and per pt she is a  and is familiar with their services. Pt plans to apply today online. Pt declined CMOC assistance. Pt also shared that she does have friends hat will be able to take her sometimes to appts. Pt declined Meals on Wheels. Pt reports no other needs or concerns. [facility] will remain available and continue to support pt.

## 2024-01-24 NOTE — PROGRESS NOTES
[facility] PRE-OPERATIVE EVALUATION  [facility]    NAME: Ina Tolbert  AGE: 68 y.o. SEX: female  : 1955     DATE: 2024    [facility] Pre-Operative Evaluation      Chief Complaint: Pre-operative Evaluation     Surgery: right knee total arthroplasty  Anticipated Date of Surgery: 24  Surgeon: Dr. Kim       History of Present Illness:     Here today for preop clearance prior to upcoming surgery.  Labs and cardiac clearance has been obtained.  She is feeling well, no concerns.           Anesthesia:  spinal and IV sedation  Bleeding Risk: no recent abnormal bleeding, no remote history of abnormal bleeding, and no use of Ca-channel blockers  Current Anti-platelet/anticoagulation medication: Aspirin    Assessment of Cardiac Risk:  Denies unstable or severe angina or MI in the last 6 weeks or history of stent placement in the last year   Denies decompensated heart failure (e.g. New onset heart failure, NYHA functional class IV heart failure, or worsening existing heart failure)  Denies significant arrhythmias such as high grade AV block, symptomatic ventricular arrhythmia, newly recognized ventricular tachycardia, supraventricular tachycardia with resting heart rate >100, or symptomatic bradycardia  Denies severe heart valve disease including aortic stenosis or symptomatic mitral stenosis     Exercise Capacity:  Able to walk 4 blocks without symptoms?: Limited d/t orthopedic issues   Able to walk 2 flights without symptoms?:Limited d/t orthopedic issues.    Prior Anesthesia Reactions: No     Personal history of venous thromboembolic disease? No    History of steroid use for >2 weeks within last year? Yes         Review of Systems:     Review of Systems   Constitutional:  Negative for chills, fatigue and fever.   Respiratory:  Negative for cough, shortness of breath and wheezing.    Cardiovascular:  Negative for chest pain, palpitations and leg swelling.    Gastrointestinal:  Negative for abdominal pain, diarrhea, nausea and vomiting.   Skin:  Negative for rash.   Neurological:  Negative for dizziness and headaches.       Current Problem List:     Patient Active Problem List   Diagnosis   • Alcohol dependence in remission ([facility])   • Allergic rhinitis   • Arthropathy of multiple sites   • Severe episode of recurrent major depressive disorder, without psychotic features ([facility])   • Irritable bowel syndrome   • Raynaud's syndrome without gangrene   • Osteopenia   • Radiculopathy of lumbar region   • 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[facility])   • Focal neurological deficit   • Gastroesophageal reflux disease with esophagitis without hemorrhage   • Cervical myelopathy with cervical radiculopathy    • History of recent intraspinal surgery   • Orthostatic hypotension   • History of migraine headaches   • Mixed dyslipidemia   • Lesion of lachelle   • Right sided temporal headache   • Fibromyalgia   • Generalized anxiety disorder   • Herniated cervical disc   • History of melanoma   • Cervical spinal stenosis   • BMI 27.0-27.9,adult   • Psychogenic weakness   • History of cholecystectomy   • Ambulatory dysfunction   • Adult hypothyroidism   • Dizziness and giddiness   • Migraine without aura and without status migrainosus, not intractable   • History of vertigo   • Epigastric abdominal pain   • Generalized abdominal pain   • Leg weakness, bilateral   • Mild asthma   • Peripheral neuropathy   • Medical clearance for psychiatric admission   • PTSD (post-traumatic stress disorder)   • Osteoarthritis of knee   • Major depressive disorder, recurrent, in partial remission ([facility])   • Gambling disorder, moderate   • Primary insomnia   • Insomnia related to another mental disorder   • Recurrent major depressive disorder, in full remission ([facility])   • Marital conflict   • Anxiety and depression   • Cancer ([facility])   • Cervical spondylosis without myelopathy   • Glaucoma   • Major depression, single episode   •  Osteoporosis   • Acute metabolic encephalopathy   • TIA (transient ischemic attack)   • Depression   • Diarrhea   • HTN (hypertension)   • Hyperlipidemia   • Crohn's disease (HCC)   • Subchondral insufficiency fracture of condyle of right femur (HCC)   • Muscle cramping   • SOB (shortness of breath) on exertion   • Elevated LFTs   • Abnormal CT of the chest   • Lumbar spinal stenosis   • Bronchiectasis without complication ([facility])       Allergies:     Allergies   Allergen Reactions   • Meperidine Lightheadedness and Other (See Comments)   • Sulfa Antibiotics Hives       Current Medications:       Current Outpatient Medications:   •  acetaminophen (TYLENOL) 650 mg CR tablet, Take 1 tablet (650 mg total) by mouth every 8 (eight) hours as needed for headaches, moderate pain or mild pain, Disp: , Rfl: 0  •  albuterol (ProAir HFA) 90 mcg/act inhaler, Inhale 2 puffs every 6 (six) hours as needed for wheezing, Disp: 8.5 g, Rfl: 0  •  amoxicillin (AMOXIL) 500 mg capsule, Take 1 capsule (500 mg total) by mouth every 8 (eight) hours for 7 days, Disp: 21 capsule, Rfl: 0  •  ascorbic acid (VITAMIN C) 500 MG tablet, Take 1 tablet (500 mg total) by mouth 3 (three) times a day for 7 days, Disp: 21 tablet, Rfl: 0  •  Calcium Carb-Cholecalciferol 600-12.5 MG-MCG CAPS, Take by mouth daily in the early morning, Disp: , Rfl:   •  cholecalciferol (VITAMIN D3) 1,000 units tablet, Take 1,000 Units by mouth daily, Disp: , Rfl:   •  [employer] Aspirin Adult Low Strength 81 MG EC tablet, daily, Disp: , Rfl:   •  DULoxetine (CYMBALTA) 60 mg delayed release capsule, TAKE 1 CAPSULE BY MOUTH EVERY DAY, Disp: 90 capsule, Rfl: 1  •  esomeprazole (NexIUM) 40 MG capsule, TAKE 1 CAPSULE BY MOUTH EVERY DAY BEFORE BREAKFAST, Disp: 90 capsule, Rfl: 1  •  ferrous sulfate 324 (65 Fe) mg, Take 1 tablet (324 mg total) by mouth daily before breakfast, Disp: 30 tablet, Rfl: 0  •  folic acid (FOLVITE) 1 mg tablet, Take 1 tablet (1 mg total) by mouth daily, Disp: 30  tablet, Rfl: 0  •  gabapentin (NEURONTIN) 300 mg capsule, Take 1 capsule (300 mg total) by mouth 2 (two) times a day, Disp: 60 capsule, Rfl: 5  •  levothyroxine 50 mcg tablet, TAKE 1 TABLET BY MOUTH EVERY DAY, Disp: 90 tablet, Rfl: 1  •  Magnesium 400 MG TABS, Take 800 mg by mouth daily, Disp: , Rfl:   •  mesalamine (LIALDA) 1.2 g EC tablet, Take 4 tablets (4.8 g total) by mouth daily with breakfast, Disp: 120 tablet, Rfl: 2  •  midodrine (PROAMATINE) 10 MG tablet, Take 1 tablet (10 mg total) by mouth in the morning Do not start before November 18, 2023. (Patient taking differently: Take 5 mg by mouth in the morning), Disp: 30 tablet, Rfl: 0  •  mupirocin (BACTROBAN) 2 % ointment, Apply topically 2 (two) times a day for 5 days Apply twice daily in each nares for the 5 days before surgery, Disp: 22 g, Rfl: 0  •  QUEtiapine (SEROquel) 100 mg tablet, Take 1 tablet (100 mg total) by mouth daily at bedtime, Disp: 90 tablet, Rfl: 1  •  Sodium Fluoride 5000 PPM 1.1 % PSTE, USE ONCE DAILY PREFERABLY AT NIGHT, Disp: , Rfl:   •  traMADol (Ultram) 50 mg tablet, Take 1 tablet (50 mg total) by mouth every 6 (six) hours as needed for moderate pain, Disp: 20 tablet, Rfl: 0  •  zinc sulfate (ZINCATE) 220 mg capsule, Take 1 capsule (220 mg total) by mouth daily, Disp: 30 capsule, Rfl: 0  No current facility-administered medications for this visit.    Past Medical History:       Past Medical History:   Diagnosis Date   • Abdominal adhesions    • Anesthesia complication     difficult to wake up   • Anxiety    • Arthritis    • Asthma     with exertion   • Cancer (HCC)     melanoma   • Colon polyp    • Crohn's disease (HCC)    • Depression    • Depression    • Disease of thyroid gland    • Fibromyalgia    • Fibromyalgia, primary    • Fractures 2006   • GERD (gastroesophageal reflux disease)    • History of cholecystectomy 09/07/2019   • Hyperlipidemia    • Irregular heart beat     can be tachy; also has orthoststic hypotension   • Lazy  eye     resolved: 3/27/17   • Medical clearance for psychiatric admission 07/13/2021   • Melanoma (HCC) 2010   • Mild asthma 11/04/2020   • Osteoarthritis 07/2018   • Osteopenia     with joint pain-elevated DEV   • Polymyalgia (HCC)    • Psychiatric disorder    • Shortness of breath     assoc with tachycardia   • Stomach disorder 1995   • Tinnitus    • Wears glasses     for reading        Past Surgical History:   Procedure Laterality Date   • ABDOMINAL SURGERY      lysis of adhesions x 2   • APPENDECTOMY     • CERVICAL FUSION      May 5,2021 and Jan. 01,2020   • CHOLECYSTECTOMY      open   • COLONOSCOPY     • DILATION AND CURETTAGE OF UTERUS     • FOOT SURGERY  05/2017   • NECK SURGERY  01/2019 5/2021   • NEUROMA EXCISION Right 05/26/2017    Procedure: EXCISION MASS / FIBROMA FOOT;  Surgeon: Jorden Crespo DPM;  Location: [facility] MAIN OR;  Service:    • PARS PLANA VITRECTOMY W/ REPAIR OF MACULAR HOLE  12/23/2020   • OH XCAPSL CTRC RMVL INSJ IO LENS PROSTH W/O ECP Left 12/06/2021    Procedure: EXTRACTION EXTRACAPSULAR CATARACT PHACO INTRAOCULAR LENS (IOL);  Surgeon: Mandeep Chavez MD;  Location: [facility] MAIN OR;  Service: Ophthalmology   • RIGHT OOPHORECTOMY     • WISDOM TOOTH EXTRACTION      x4        Family History   Problem Relation Age of Onset   • Heart disease Mother    • Stroke Mother 62   • COPD Mother    • Arthritis Mother    • Hypertension Father    • Dislocations Sister    • Multiple sclerosis Sister    • Neurological problems Sister    • Scoliosis Sister    • No Known Problems Maternal Grandmother    • No Known Problems Maternal Grandfather    • No Known Problems Paternal Grandmother    • No Known Problems Paternal Grandfather    • Kidney disease Brother         kidney transplant   • No Known Problems Maternal Aunt    • No Known Problems Maternal Uncle    • No Known Problems Paternal Aunt    • No Known Problems Paternal Uncle    • ADD / ADHD Neg Hx    • Anesthesia problems Neg Hx    • Cancer Neg Hx    •  "Clotting disorder Neg Hx    • Collagen disease Neg Hx    • Diabetes Neg Hx    • Dislocations Neg Hx    • Learning disabilities Neg Hx    • Neurological problems Neg Hx    • Osteoporosis Neg Hx    • Rheumatologic disease Neg Hx    • Scoliosis Neg Hx    • Vascular Disease Neg Hx         Social History     Socioeconomic History   • Marital status: /Civil Union     Spouse name: Not on file   • Number of children: Not on file   • Years of education: Not on file   • Highest education level: Not on file   Occupational History   • Not on file   Tobacco Use   • Smoking status: Never     Passive exposure: Never   • Smokeless tobacco: Never   Vaping Use   • Vaping status: Never Used   Substance and Sexual Activity   • Alcohol use: Not Currently   • Drug use: No   • Sexual activity: Not Currently     Partners: Male   Other Topics Concern   • Not on file   Social History Narrative   • Not on file     Social Determinants of Health     Financial Resource Strain: Not on file   Food Insecurity: No Food Insecurity (11/14/2023)    Hunger Vital Sign    • Worried About Running Out of Food in the Last Year: Never true    • Ran Out of Food in the Last Year: Never true   Transportation Needs: No Transportation Needs (11/14/2023)    PRAPARE - Transportation    • Lack of Transportation (Medical): No    • Lack of Transportation (Non-Medical): No   Physical Activity: Not on file   Stress: Not on file   Social Connections: Not on file   Intimate Partner Violence: Not on file   Housing Stability: Low Risk  (11/14/2023)    Housing Stability Vital Sign    • Unable to Pay for Housing in the Last Year: No    • Number of Places Lived in the Last Year: 1    • Unstable Housing in the Last Year: No        Physical Exam:     /78   Pulse 100   Temp 97.5 °F (36.4 °C)   Resp 16   Ht 5' 5\" (1.651 m) Comment: declined removing her shoes  Wt 78 kg (172 lb)   LMP  (LMP Unknown)   BMI 28.62 kg/m²     Physical Exam  Vitals and nursing note " reviewed.   Constitutional:       Appearance: Normal appearance. She is well-developed.   HENT:      Right Ear: Tympanic membrane normal.      Left Ear: Tympanic membrane normal.   Eyes:      Conjunctiva/sclera: Conjunctivae normal.   Cardiovascular:      Rate and Rhythm: Normal rate and regular rhythm.      Pulses: Normal pulses.      Heart sounds: Normal heart sounds. No murmur heard.  Pulmonary:      Effort: Pulmonary effort is normal.      Breath sounds: Normal breath sounds.   Abdominal:      General: Bowel sounds are normal.   Skin:     General: Skin is warm and dry.   Neurological:      Mental Status: She is alert.   Psychiatric:         Mood and Affect: Mood normal.         Behavior: Behavior normal.          Data:     Pre-operative work-up    Laboratory Results: I have personally reviewed the pertinent laboratory results/reports      EKG: I have personally reviewed pertinent reports.      Recent Results (from the past 672 hour(s))   IgG 1, 2, 3, and 4    Collection Time: 01/10/24  9:31 AM   Result Value Ref Range    IgG 714 586 - 1,602 mg/dL    IgG 1 436 248 - 810 mg/dL    IgG 2 201 130 - 555 mg/dL    IgG 3 59 15 - 102 mg/dL    IgG 4 25 2 - 96 mg/dL   IgG, IgA, IgM    Collection Time: 01/10/24  9:31 AM   Result Value Ref Range    IgA 218 87 - 352 mg/dL     26 - 217 mg/dL   Allergen, Respiratory Zone 1 Profile    Collection Time: 01/10/24  9:31 AM   Result Value Ref Range    Class Description Comment     Immunoglobulin E, Total <2 (L) 6 - 495 IU/mL    D.PTERONYSSINUS IGE <0.10 Class 0 kU/L    D. FARINAE <0.10 Class 0 kU/L    Cat Dander <0.10 Class 0 kU/L    DOG DANDER IGE <0.10 Class 0 kU/L    BERMUDA GRASS IGE <0.10 Class 0 kU/L    VAMSI GRASS <0.10 Class 0 kU/L    COCKROACH, Faroese <0.10 Class 0 kU/L    PENICILLIUM CHRYSOGENUM <0.10 Class 0 kU/L    Cladosporium Herbarum <0.10 Class 0 kU/L    Aspergillus fumigatus <0.10 Class 0 kU/L    Alternaria Alternata <0.10 Class 0 kU/L    MAPLE/BOX ELDER IGE  <0.10 Class 0 kU/L    Birch <0.10 Class 0 kU/L    MOUNTAIN CEDAR <0.10 Class 0 kU/L    T007 [place], White <0.10 Class 0 kU/L    [place] <0.10 Class 0 kU/L    WAL[place]UT <0.10 Class 0 kU/L    [place] MAPLE LEAF <0.10 Class 0 kU/L    [place] Tree <0.10 Class 0 kU/L    WHITE MARITZA <0.10 Class 0 kU/L    T070 White Ava <0.10 Class 0 kU/L    Short Ragwd(A kevin.) IgE <0.10 Class 0 kU/L    MUGWORT IGE <0.10 Class 0 kU/L    COMMON PIGWEED <0.10 Class 0 kU/L    SHEEP SORREL <0.10 Class 0 kU/L    Mouse Urine Proteins <0.10 Class 0 kU/L   Comprehensive metabolic panel    Collection Time: 01/18/24  8:41 AM   Result Value Ref Range    Sodium 141 135 - 147 mmol/L    Potassium 4.6 3.5 - 5.3 mmol/L    Chloride 105 96 - 108 mmol/L    CO2 25 21 - 32 mmol/L    ANION GAP 11 mmol/L    BUN 17 5 - 25 mg/dL    Creatinine 0.83 0.60 - 1.30 mg/dL    Glucose 126 65 - 140 mg/dL    Calcium 9.4 8.4 - 10.2 mg/dL    AST 34 13 - 39 U/L    ALT 35 7 - 52 U/L    Alkaline Phosphatase 61 34 - 104 U/L    Total Protein 6.4 6.4 - 8.4 g/dL    Albumin 4.1 3.5 - 5.0 g/dL    Total Bilirubin 0.39 0.20 - 1.00 mg/dL    eGFR 73 ml/min/1.73sq m   CBC and differential    Collection Time: 01/18/24  8:41 AM   Result Value Ref Range    WBC 4.70 4.31 - 10.16 Thousand/uL    RBC 4.56 3.81 - 5.12 Million/uL    Hemoglobin 13.1 11.5 - 15.4 g/dL    Hematocrit 41.4 34.8 - 46.1 %    MCV 91 82 - 98 fL    MCH 28.7 26.8 - 34.3 pg    MCHC 31.6 31.4 - 37.4 g/dL    RDW 13.5 11.6 - 15.1 %    MPV 10.6 8.9 - 12.7 fL    Platelets 272 149 - 390 Thousands/uL    nRBC 0 /100 WBCs    Neutrophils Relative 64 43 - 75 %    Immat GRANS % 0 0 - 2 %    Lymphocytes Relative 22 14 - 44 %    Monocytes Relative 11 4 - 12 %    Eosinophils Relative 2 0 - 6 %    Basophils Relative 1 0 - 1 %    Neutrophils Absolute 3.00 1.85 - 7.62 Thousands/µL    Immature Grans Absolute 0.01 0.00 - 0.20 Thousand/uL    Lymphocytes Absolute 1.05 0.60 - 4.47 Thousands/µL    Monocytes Absolute 0.50 0.17 - 1.22 Thousand/µL     Eosinophils Absolute 0.10 0.00 - 0.61 Thousand/µL    Basophils Absolute 0.04 0.00 - 0.10 Thousands/µL   Protime-INR    Collection Time: 01/18/24  8:41 AM   Result Value Ref Range    Protime 12.9 11.6 - 14.5 seconds    INR 0.98 0.84 - 1.19   APTT    Collection Time: 01/18/24  8:41 AM   Result Value Ref Range    PTT 27 23 - 37 seconds   MRSA culture    Collection Time: 01/18/24  8:41 AM    Specimen: Nose; Nares   Result Value Ref Range    MRSA Culture Only (A)      Methicillin Resistant Staphylococcus aureus isolated    MRSA Culture Only       This patient requires contact isolation precautions per [place] law. Contact precautions are not required in [place] for nasal surveillance cultures.   Hemoglobin A1C    Collection Time: 01/18/24  8:41 AM   Result Value Ref Range    Hemoglobin A1C 6.6 (H) Normal 4.0-5.6%; PreDiabetic 5.7-6.4%; Diabetic >=6.5%; Glycemic control for adults with diabetes <7.0% %     mg/dl           Assessment & Recommendations:     1. Blood glucose elevated  Comments:  likely d/t prolonged Prednisone.  Recommended repeating labs in 4 months    2. Preop examination  -     Ambulatory referral to Family Practice    3. Primary osteoarthritis of right knee  -     Ambulatory referral to Family Practice    4. Chronic pain of right knee  -     Ambulatory referral to Family Practice    5. Acquired valgus deformity knee, right  -     Ambulatory referral to Family Practice    6. Orthostatic hypotension  Assessment & Plan:  S/p loop recorder, management per cardiology       7. Crohn's disease of both small and large intestine without complication ([facility])  Assessment & Plan:  Recently completed Prednisone taper, management per GI      8. Polymyalgia ([facility])    9. Severe episode of recurrent major depressive disorder, without psychotic features ([facility])  Assessment & Plan:  Management per psychiatry       10. Alcohol dependence in remission ([facility])  Assessment & Plan:  Continues to abstain      11.  Cancer (HCC)          Pre-Op Evaluation Assessment  68 y.o. female with planned surgery: as above.    Known risk factors for perioperative complications: None.        Current medications which may produce withdrawal symptoms if withheld perioperatively: none.    Pre-Op Evaluation Plan  1. Further preoperative workup as follows:   - None; no further preoperative work-up is required    2. Medication Management/Recommendations:   - Patient has been instructed to avoid herbs or non-directed vitamins the week prior to surgery to ensure no drug interactions with perioperative surgical and anesthetic medications.  - Patient has been instructed to avoid aspirin containing medications or non-steroidal anti-inflammatory drugs for the week preceding surgery.    3. Prophylaxis for cardiac events with perioperative beta-blockers: not indicated.    4. Patient requires further consultation with: None    Clearance  Patient is CLEARED for surgery without any additional cardiac testing.    Cardiac clearance per Dr. Kim, note reviewed in pt's medical record.     Dorinda Davis [facility]  [address] [address]  Phone#  [phone]  Fax#  [phone]

## 2024-01-27 DIAGNOSIS — F33.41 MAJOR DEPRESSIVE DISORDER, RECURRENT, IN PARTIAL REMISSION (HCC): ICD-10-CM

## 2024-01-29 ENCOUNTER — EVALUATION (OUTPATIENT)
Dept: PHYSICAL THERAPY | Facility: CLINIC | Age: 69
End: 2024-01-29
Payer: COMMERCIAL

## 2024-01-29 DIAGNOSIS — G89.29 CHRONIC PAIN OF RIGHT KNEE: ICD-10-CM

## 2024-01-29 DIAGNOSIS — M25.561 CHRONIC PAIN OF RIGHT KNEE: ICD-10-CM

## 2024-01-29 DIAGNOSIS — M17.11 PRIMARY OSTEOARTHRITIS OF RIGHT KNEE: Primary | ICD-10-CM

## 2024-01-29 PROCEDURE — 97161 PT EVAL LOW COMPLEX 20 MIN: CPT | Performed by: PHYSICAL THERAPIST

## 2024-01-29 RX ORDER — QUETIAPINE FUMARATE 100 MG/1
100 TABLET, FILM COATED ORAL
Qty: 90 TABLET | Refills: 1 | Status: SHIPPED | OUTPATIENT
Start: 2024-01-29

## 2024-01-29 NOTE — PROGRESS NOTES
PT Evaluation     Today's date: 2024  Patient name: Ina Tolbert  : 1955  MRN: [id number]  Referring provider: Jairon Kim DO  Dx:   Encounter Diagnosis     ICD-10-CM    1. Primary osteoarthritis of right knee  M17.11       2. Chronic pain of right knee  M25.561     G89.29                      Assessment  Assessment details: Ina Tolbert is a 68 y.o. female who presents to physical therapy with pain, decreased LE range of motion, decreased LE strength, fair balance, impaired function and decreased activity tolerance. Patient's clinical presentation is consistent with their referring diagnosis of Primary osteoarthritis of right knee  (primary encounter diagnosis)  Chronic pain of right knee. The pt presents with functional limitations of ADLs, recreational activities, ambulation, performing household chores and stair negotiation. Pt would benefit from physical therapy services to address these limitations and maximize function. Pt was instructed and educated on home exercise program today and demonstrates understanding.     Impairments: abnormal gait, abnormal muscle firing, abnormal muscle tone, abnormal or restricted ROM, abnormal movement, activity intolerance, impaired balance, impaired physical strength, lacks appropriate home exercise program, pain with function, weight-bearing intolerance, poor posture  and poor body mechanics  Understanding of Dx/Px/POC: good   Prognosis: good    Goals  Short term goals  (6 weeks)  1.  Patient will have no pain right knee  2 . Patient will have full range of motion right knee  3.  Patient will report a 50% improvement with activities of daily living   4.  Patient will decrease girth of right knee by 1 to 2 cm.     Long term goals - (12 weeks)  1.  Patient will be independent with home exercise program  2.  Patient will have no gait deviations ambulating on level surfaces.   3.  Patient will report 80 % improvement with activity of daily living function.    4.  Patient will negotiate stairs up and down pain free with use of one railing.       Plan  Plan details: 2 to 3 visits per week for 12 weeks  Patient would benefit from: PT eval and skilled physical therapy  Planned modality interventions: cryotherapy  Planned therapy interventions: ADL training, balance/weight bearing training, joint mobilization, manual therapy, massage, neuromuscular re-education, patient education, postural training, strengthening, stretching, functional ROM exercises, therapeutic activities, therapeutic exercise, gait training and home exercise program  Duration in weeks: 12  Treatment plan discussed with: patient        Subjective Evaluation    History of Present Illness  Mechanism of injury: She reports in 2021 she was diagnosed with right knee OA by Dr. Kim.  She has had 3 injections and fluid removed.  She is now in significant pain and her function is significantly decreased.  She is scheduled for right TKA next 24.  She was referred to PT.  Patient Goals  Patient goals for therapy: independence with ADLs/IADLs  Patient's goals regarding treatment: walk normally for 1/2 mile.  Pain  Current pain ratin  At best pain ratin  At worst pain rating: 10  Location: Right knee  Quality: sharp and dull ache  Relieving factors: medications, ice, change in position and rest  Aggravating factors: walking, standing and stair climbing (driving)    Exercise history: Housework        Objective     Palpation     Right   Hypertonic in the distal biceps femoris, distal semimembranosus and distal semitendinosus.     Active Range of Motion   Left Knee   Flexion: 131 degrees   Extension: 0 degrees     Right Knee   Flexion: 88 degrees   Extension: -2 degrees     Mobility   Patellar Mobility:     Right Knee   Hypomobile: medial, lateral, superior and inferior     Strength/Myotome Testing     Left Knee   Flexion: 4+  Extension: 4+    Right Knee   Flexion: 3+  Extension: 3+    Swelling      Left Knee Girth Measurement (cm)   Joint line: 35 cm    Right Knee Girth Measurement (cm)   Joint line: 27.1 cm    Ambulation   Weight-Bearing Status   Assistive device used: none    Observational Gait   Gait: antalgic   Decreased walking speed and right stance time.                Eval/ Re-eval Auth #/ Referral # Total visits Start date  Expiration date Total active units  Total manual units  PT only or  PT+OT?   1/29/24 1/29              Total units authorized                Total units remaining                    Precautions:  Asthma      Specialty Daily Treatment Diary     Manuals 1/29/24       Visit # 1       PF mobs        Stretch HS Quad        Knee PROM                Flex AROM 88 deg       Ext AROM -2 deg               Warm-up        NuStep        Neuro Re-Ed        Qset 20       SLR        Ankle pumps 20                       Ther Ex        Mini squat        Side step        Knee flex        Knee ext        Heel slides 20       Clamshell                        Ther Activity                        Gait Training        W/ rw reviewed               Modalities        CP

## 2024-01-30 ENCOUNTER — PATIENT OUTREACH (OUTPATIENT)
Dept: OBGYN CLINIC | Facility: HOSPITAL | Age: 69
End: 2024-01-30

## 2024-01-30 NOTE — PROGRESS NOTES
[facility] attempted to contact pt to follow up in regard to transportation. When [facility] last spoke to pt on 01/24/2024 and pt had planned to contact [facility] to apply for their services. I was unable to reach pt and a message was left to please return [facility]. [facility] will remain available.

## 2024-01-31 ENCOUNTER — TELEPHONE (OUTPATIENT)
Age: 69
End: 2024-01-31

## 2024-01-31 NOTE — TELEPHONE ENCOUNTER
Spoke with patient, she was informed by her dentist that she has an infected tooth that needs to be pulled and a root canal completed and can not have surgery until that is completed.  Pt states she has an appt tomorrow at 230pm, she believes that she will have the issue taken care of.    Pt also stated that she was on antibiotics 2 weeks ago for the infection of the tooth    She will call back tomorrow to let us know if dentist will clear her for surgery.

## 2024-01-31 NOTE — TELEPHONE ENCOUNTER
Caller: Patient    Doctor: Dr. Kim    Reason for call: Patient calling asking to speak to the surgery coordinator in regard to upcoming surgery with Dr. Kim on 2/6/24.  She can be reached at the number below.    Call back#: [phone]

## 2024-02-02 NOTE — TELEPHONE ENCOUNTER
Spoke with pt, she states dentist was able to remove all of the infection within the tooth and will be clearing pt for surgery however the clearance will not come through until Monday as the dentist is off today.

## 2024-02-05 ENCOUNTER — TELEPHONE (OUTPATIENT)
Dept: GASTROENTEROLOGY | Facility: CLINIC | Age: 69
End: 2024-02-05

## 2024-02-05 ENCOUNTER — HOSPITAL ENCOUNTER (OUTPATIENT)
Dept: RADIOLOGY | Facility: HOSPITAL | Age: 69
Discharge: HOME | End: 2024-02-05
Payer: COMMERCIAL

## 2024-02-05 ENCOUNTER — NURSE TRIAGE (OUTPATIENT)
Age: 69
End: 2024-02-05

## 2024-02-05 ENCOUNTER — OFFICE VISIT (OUTPATIENT)
Dept: GASTROENTEROLOGY | Facility: CLINIC | Age: 69
End: 2024-02-05
Payer: COMMERCIAL

## 2024-02-05 VITALS
WEIGHT: 166 LBS | HEART RATE: 117 BPM | DIASTOLIC BLOOD PRESSURE: 88 MMHG | BODY MASS INDEX: 27.66 KG/M2 | HEIGHT: 65 IN | SYSTOLIC BLOOD PRESSURE: 116 MMHG

## 2024-02-05 DIAGNOSIS — K50.80 CROHN'S DISEASE OF BOTH SMALL AND LARGE INTESTINE WITHOUT COMPLICATION (HCC): Primary | ICD-10-CM

## 2024-02-05 DIAGNOSIS — K86.2 PANCREATIC CYST: ICD-10-CM

## 2024-02-05 DIAGNOSIS — R10.32 LLQ PAIN: ICD-10-CM

## 2024-02-05 PROBLEM — C43.9 MALIGNANT MELANOMA OF SKIN (HCC): Status: ACTIVE | Noted: 2024-02-05

## 2024-02-05 PROBLEM — F41.9 ANXIETY: Status: ACTIVE | Noted: 2024-02-05

## 2024-02-05 PROCEDURE — 74176 CT ABD & PELVIS W/O CONTRAST: CPT

## 2024-02-05 PROCEDURE — 99214 OFFICE O/P EST MOD 30 MIN: CPT | Performed by: NURSE PRACTITIONER

## 2024-02-05 PROCEDURE — G1004 CDSM NDSC: HCPCS

## 2024-02-05 RX ADMIN — IOHEXOL 50 ML: 240 INJECTION, SOLUTION INTRATHECAL; INTRAVASCULAR; INTRAVENOUS; ORAL at 13:54

## 2024-02-05 NOTE — ANESTHESIA PREPROCEDURE EVALUATION
Procedure:  ARTHROPLASTY KNEE TOTAL W ROBOT - RIGHT - PRESS FIT - OVERNIGHT (Right: Knee)    Relevant Problems   CARDIO   (+) HTN (hypertension)   (+) Hyperlipidemia   (+) Migraine without aura and without status migrainosus, not intractable   (+) Raynaud's syndrome without gangrene   (+) SOB (shortness of breath) on exertion      ENDO   (+) Adult hypothyroidism      GI/HEPATIC   (+) Gastroesophageal reflux disease with esophagitis without hemorrhage      MUSCULOSKELETAL   (+) Cervical spondylosis without myelopathy   (+) Fibromyalgia   (+) 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HCC)   (+) Osteoarthritis of knee      NEURO/PSYCH   (+) Anxiety   (+) Anxiety and depression   (+) Depressive disorder   (+) Fibromyalgia   (+) Generalized anxiety disorder   (+) Major depression, single episode   (+) Major depressive disorder, recurrent, in partial remission (HCC)   (+) Migraine without aura and without status migrainosus, not intractable   (+) PTSD (post-traumatic stress disorder)   (+) Recurrent major depressive disorder, in full remission (HCC)   (+) Right sided temporal headache   (+) Severe episode of recurrent major depressive disorder, without psychotic features (HCC)   (+) TIA (transient ischemic attack)      PULMONARY   (+) Mild asthma   (+) SOB (shortness of breath) on exertion      Digestive   (+) Irritable bowel syndrome      Other   (+) Alcohol dependence in remission (HCC)   (+) Crohn's disease (HCC)   (+) Glaucoma        Physical Exam    Airway    Mallampati score: II  TM Distance: >3 FB  Neck ROM: full     Dental       Cardiovascular  Rhythm: regular, Rate: normal    Pulmonary   Breath sounds clear to auscultation    Other Findings  post-pubertal.      Anesthesia Plan  ASA Score- 2     Anesthesia Type- spinal with ASA Monitors.         Additional Monitors:     Airway Plan:     Comment: Spinal with MAC/Sedation; post-procedure adductor canal block.       Plan Factors-    Chart reviewed.        Patient is not a current smoker.               Induction- intravenous.    Postoperative Plan- Plan for postoperative opioid use.     Informed Consent- Anesthetic plan and risks discussed with patient.  I personally reviewed this patient with the CRNA. Discussed and agreed on the Anesthesia Plan with the CRNA..

## 2024-02-05 NOTE — PROGRESS NOTES
[facility] [place] - Outpatient Follow-up Note  Ina Tolbert 68 y.o. female MRN: [id number]  Encounter: [id number]          ASSESSMENT AND PLAN:      1. Crohn's disease of both small and large intestine without complication (HCC)  Patient with history of Crohn's disease affecting the ileocecal valve and terminal ileum diagnosed in May 2023 on colonoscopy.  She was treated with prednisone initially and transition to Remicade which seem to be working, however she had side effects of hair loss, bruising, myalgias, and elevated LFTs and opted to stop this.  She stopped prednisone taper at the beginning of January and has been on Lialda since December.  Reports recent increase frequency of stool when she was on antibiotics for a dental infection, but this has now normalized. She had to stop the Lialda on Monday for upcoming surgery. She was to follow-up with IBD specialist to discuss alternatives to Remicade, however has not yet been able to make the appointment.  Will send another referral.  -     Ambulatory Referral to Gastroenterology; Future    2. LLQ pain  Patient with some tenderness in the left lower quadrant on palpation in the office, but denies any pain at the area otherwise. Had sigmoid inflammation seen on prior CT enterography in November. Denies any fevers or chills, rectal bleeding.  Will have patient obtain CT scan to rule out underlying inflammation/diverticulitis. This should not impact plans for knee surgery tomorrow.  -     CT abdomen pelvis w contrast; Future; Expected date: 02/05/2024    3. Pancreatic cyst  Last MRI/MRCP in July 2023 noted pancreatic cysts measuring up to 13 x 4 mm in the pancreatic head, she is due for repeat for surveillance in July 2025    ______________________________________________________________________    SUBJECTIVE:  Ina Tolbert is a 68 y.o. female with history of Crohn's disease affecting the ileocecal valve and terminal ileum diagnosed in May  2023 on colonoscopy performed for change in bowel habits.  She responded to a course of prednisone initially and transition to infliximab which seem to be working but she had possible side effects with hair loss, bruising, myalgias, and increased LFTs with concern for possible infliximab induced lupus-like syndrome though this was not definitive. Last visit with Dr. Lopez in December she decided to stop Remicade and was placed on Mesalamine and referred to IBD team but has not yet seen them.    Came off steroids 2nd week in January & has been on Lialda and moving her bowels every other day to every 2 days with harder type stool. Recently she had increased bowel movements (2x daily) which were green associated with RUQ abdominal cramping, but this is now resolved. She has a hx of cholecystectomy. She was treated with Amoxicillin recently for a dental infection. Last Monday she came off her Lialda in preparation for a knee replacement.       REVIEW OF SYSTEMS IS OTHERWISE NEGATIVE.      Historical Information   Past Medical History:   Diagnosis Date    Abdominal adhesions     Alcoholism ([facility]) 1996    in remission    Anesthesia complication     difficult to wake up    Anxiety     Arthritis     Asthma     with exertion    Cancer ([facility])     melanoma    Colon polyp     Crohn's disease ([facility])     Depression     Depression     Disease of thyroid gland     Fibromyalgia     Fibromyalgia, primary     Fractures 2006    GERD (gastroesophageal reflux disease)     History of cholecystectomy 09/07/2019    Hyperlipidemia     Infected tooth 01/2024    Tooth #14-was treated with antibiotics-endodontal appt 2/1 may need root canal    Irregular heart beat     can be tachy; also has orthoststic hypotension    Irritable bowel syndrome 1990    Lazy eye     resolved: 3/27/17    Medical clearance for psychiatric admission 07/13/2021    Melanoma (HCC) 2010    Mild asthma 11/04/2020    Osteoarthritis 07/2018    Osteopenia     with joint  pain-elevated DEV    Polymyalgia (HCC)     Psychiatric disorder     Shortness of breath     assoc with tachycardia    Stomach disorder 1995    Tinnitus     Wears glasses     for reading     Past Surgical History:   Procedure Laterality Date    ABDOMINAL SURGERY      lysis of adhesions x 2    APPENDECTOMY      CERVICAL FUSION      May 5,2021 and Jan. 01,2020    CHOLECYSTECTOMY      open    COLONOSCOPY      DILATION AND CURETTAGE OF UTERUS      FOOT SURGERY  05/2017    NECK SURGERY  01/2019 5/2021    NEUROMA EXCISION Right 05/26/2017    Procedure: EXCISION MASS / FIBROMA FOOT;  Surgeon: Jorden Cresop DPM;  Location: [facility] MAIN OR;  Service:     PARS PLANA VITRECTOMY W/ REPAIR OF MACULAR HOLE  12/23/2020    DE XCAPSL CTRC RMVL INSJ IO LENS PROSTH W/O ECP Left 12/06/2021    Procedure: EXTRACTION EXTRACAPSULAR CATARACT PHACO INTRAOCULAR LENS (IOL);  Surgeon: Mandeep Chavez MD;  Location: [facility] MAIN OR;  Service: Ophthalmology    RIGHT OOPHORECTOMY      UPPER GASTROINTESTINAL ENDOSCOPY  5/2019    WISDOM TOOTH EXTRACTION      x4     Social History   Social History     Substance and Sexual Activity   Alcohol Use Not Currently     Social History     Substance and Sexual Activity   Drug Use No     Social History     Tobacco Use   Smoking Status Never    Passive exposure: Never   Smokeless Tobacco Never     Family History   Problem Relation Age of Onset    Heart disease Mother     Stroke Mother 62    COPD Mother     Arthritis Mother     Asthma Mother     Hypertension Father     Dislocations Sister     Multiple sclerosis Sister     Neurological problems Sister     Scoliosis Sister     Depression Sister     Mental illness Sister     No Known Problems Maternal Grandmother     No Known Problems Maternal Grandfather     No Known Problems Paternal Grandmother     No Known Problems Paternal Grandfather     Kidney disease Brother         kidney transplant    No Known Problems Maternal Aunt     No Known Problems Maternal Uncle     No  "Known Problems Paternal Aunt     No Known Problems Paternal Uncle     ADD / ADHD Neg Hx     Anesthesia problems Neg Hx     Cancer Neg Hx     Clotting disorder Neg Hx     Collagen disease Neg Hx     Diabetes Neg Hx     Dislocations Neg Hx     Learning disabilities Neg Hx     Neurological problems Neg Hx     Osteoporosis Neg Hx     Rheumatologic disease Neg Hx     Scoliosis Neg Hx     Vascular Disease Neg Hx        Meds/Allergies       Current Outpatient Medications:     acetaminophen (TYLENOL) 650 mg CR tablet    albuterol (ProAir HFA) 90 mcg/act inhaler    ascorbic acid (VITAMIN C) 500 MG tablet    Calcium Carb-Cholecalciferol 600-12.5 MG-MCG CAPS    cholecalciferol (VITAMIN D3) 1,000 units tablet    [employer] Aspirin Adult Low Strength 81 MG EC tablet    DULoxetine (CYMBALTA) 60 mg delayed release capsule    esomeprazole (NexIUM) 40 MG capsule    ferrous sulfate 324 (65 Fe) mg    folic acid (FOLVITE) 1 mg tablet    gabapentin (NEURONTIN) 300 mg capsule    levothyroxine 50 mcg tablet    Magnesium 400 MG TABS    mesalamine (LIALDA) 1.2 g EC tablet    midodrine (PROAMATINE) 10 MG tablet    mupirocin (BACTROBAN) 2 % ointment    QUEtiapine (SEROquel) 100 mg tablet    Sodium Fluoride 5000 PPM 1.1 % PSTE    traMADol (Ultram) 50 mg tablet    zinc sulfate (ZINCATE) 220 mg capsule    Allergies   Allergen Reactions    Meperidine Lightheadedness and Other (See Comments)    Sulfa Antibiotics Hives           Objective     Blood pressure 116/88, pulse (!) 117, height 5' 5\" (1.651 m), weight 75.3 kg (166 lb). Body mass index is 27.62 kg/m².      PHYSICAL EXAM:      General Appearance:   Alert, cooperative, no distress   HEENT:   Normocephalic, atraumatic, anicteric.     Neck:  Supple, symmetrical, trachea midline   Lungs:   Clear to auscultation bilaterally; no rales, rhonchi or wheezing; respirations unlabored    Heart::   Regular rate and rhythm; no murmur.   Abdomen:   Soft, LLQ tender, non-distended; normal bowel sounds; no masses, " no organomegaly    Genitalia:   Deferred    Rectal:   Deferred    Extremities:  No cyanosis, clubbing or edema    Skin:  No jaundice, rashes, or lesions    Lymph nodes:  No palpable cervical lymphadenopathy        Lab Results:   No visits with results within 1 Day(s) from this visit.   Latest known visit with results is:   Appointment on 01/18/2024   Component Date Value    Sodium 01/18/2024 141     Potassium 01/18/2024 4.6     Chloride 01/18/2024 105     CO2 01/18/2024 25     ANION GAP 01/18/2024 11     BUN 01/18/2024 17     Creatinine 01/18/2024 0.83     Glucose 01/18/2024 126     Calcium 01/18/2024 9.4     AST 01/18/2024 34     ALT 01/18/2024 35     Alkaline Phosphatase 01/18/2024 61     Total Protein 01/18/2024 6.4     Albumin 01/18/2024 4.1     Total Bilirubin 01/18/2024 0.39     eGFR 01/18/2024 73     WBC 01/18/2024 4.70     RBC 01/18/2024 4.56     Hemoglobin 01/18/2024 13.1     Hematocrit 01/18/2024 41.4     MCV 01/18/2024 91     MCH 01/18/2024 28.7     MCHC 01/18/2024 31.6     RDW 01/18/2024 13.5     MPV 01/18/2024 10.6     Platelets 01/18/2024 272     nRBC 01/18/2024 0     Neutrophils Relative 01/18/2024 64     Immat GRANS % 01/18/2024 0     Lymphocytes Relative 01/18/2024 22     Monocytes Relative 01/18/2024 11     Eosinophils Relative 01/18/2024 2     Basophils Relative 01/18/2024 1     Neutrophils Absolute 01/18/2024 3.00     Immature Grans Absolute 01/18/2024 0.01     Lymphocytes Absolute 01/18/2024 1.05     Monocytes Absolute 01/18/2024 0.50     Eosinophils Absolute 01/18/2024 0.10     Basophils Absolute 01/18/2024 0.04     Protime 01/18/2024 12.9     INR 01/18/2024 0.98     PTT 01/18/2024 27     MRSA Culture Only 01/18/2024 Methicillin Resistant Staphylococcus aureus isolated (A)     MRSA Culture Only 01/18/2024 This patient requires contact isolation precautions per [place] law. Contact precautions are not required in [place] for nasal surveillance cultures.     Hemoglobin A1C 01/18/2024 6.6  (H)     EAG 01/18/2024 143          Radiology Results:   Complete PFT with post bronchodilator    Result Date: 1/26/2024  Narrative: Pulmonary Function Test Report Ina Tolbert 68 y.o. female MRN: [id number] Date of Testing: January 24, 2024 Date of Interpretation: January 26, 2024 Requesting Physician: Yamil Reason for Testing: Abnormal CT, acute bronchitis, bronchiectasis Reference set for interpretation: [id number] Procedure: The patient was taken to pulmonary function testing laboratory.  The patient demonstrated good effort and cooperation.  The results of this test meet ATS standards for acceptability and repeatability.  Data set appears appropriate for interpretation. Post bronchodilator testing performed after the administration of 2.5mg albuterol in 3cc normal saline administered via nebulizer per bronchodilator protocol. Results: FEV1/FVC Ratio: 82% Forced Vital Capacity: 2.11 L 69% predicted FEV1: 1.74 L 73% predicted After administration of bronchodilator FEV1/FVC Ratio: 85% FVC: 2.08 L, 68% predicted, -1% change FEV1: 1.77 L, 74% predicted, +280% change Lung volumes by body plethysmography: Total Lung Capacity 80% predicted Residual volume 84% predicted DLCO corrected for patients hemoglobin level: 64% Interpretation: Borderline ratio, reduced FVC No significant bronchodilator response Lung volumes reveal possible mild restriction DLCO corrected mildly decreased Flow-volume loops appear normal Aye Champion MD Pulmonary and Critical Care Medicine     XR knee 1 or 2 vw left    Result Date: 1/17/2024  Narrative: LEFT KNEE INDICATION:   Pain in right knee. COMPARISON:  Comparison made with previous examination(s) dated (MR) 18-May-2023,(DX) 05-Nov-2021,(CR) 08-Feb-2021. VIEWS:  XR KNEE 1 OR 2 VW LEFT FINDINGS: There is no acute fracture or dislocation. Joint effusion cannot be reliably evaluated without lateral view. No significant degenerative changes. No lytic or blastic osseous lesion. Soft tissues are  unremarkable.     Impression: No acute osseous abnormality. Electronically signed: 01/17/2024 09:38 AM Roby Kat MD    XR knee 3 vw right non injury    Result Date: 1/17/2024  Narrative: RIGHT KNEE INDICATION:   Pain in right knee.   COMPARISON:  Comparison made with previous examination(s) dated (MR) 18-May-2023,(DX) 05-Nov-2021,(CR) 08-Feb-2021. VIEWS:  XR KNEE 3 VW RIGHT NON INJURY FINDINGS: There is no acute fracture or dislocation. There is a moderate joint effusion. There is at least moderate degenerative narrowing of the lateral compartment. Small osteophyte of the lateral tibial plateau. Some sclerotic changes along the articular surface of the lateral tibial plateau. Medial compartment space is preserved. There is mild valgus angulation secondarily. Patellofemoral space preserved. No lytic or blastic osseous lesion. Soft tissues are unremarkable.     Impression: Moderate degenerative changes of the lateral compartment space with resulting valgus angulation of the knee joint. Medial compartment and patellofemoral space preserved. Small to moderate suprapatellar effusion. No fracture Electronically signed: 01/17/2024 09:37 AM Roby Kat MD

## 2024-02-05 NOTE — TELEPHONE ENCOUNTER
"SPOKE WITH TONEY FROM ORTHO SCHEDULING, WANTS TO KNOW IF STAT CT SCAN WILL PRECLUDE PT FROM HAVING R TKR ON 2/6/24, IF THIS CAN BE DOCUMENTED IN YOUR OFFICE VISIT NOTE.          Reason for Disposition   Information only question and nurse able to answer    Answer Assessment - Initial Assessment Questions  1. REASON FOR CALL or QUESTION: \"What is your reason for calling today?\" or \"How can I best help you?\" or \"What question do you have that I can help answer?\"      SPOKE WITH TONEY FROM ORTHO SCHEDULING, WANTS TO KNOW IF STAT CT SCAN WILL PRECLUDE PT FROM HAVING R TKR ON 2/6/24, IF THIS CAN BE DOCUMENTED IN YOUR OFFICE VISIT NOTE.    Protocols used: Information Only Call - No Triage-ADULT-[place]    "

## 2024-02-05 NOTE — TELEPHONE ENCOUNTER
Regarding: Surgery Question  ----- Message from Deirdre Donald sent at 2/5/2024  9:47 AM EST -----  Pt is titi for a procedure tomorrow with Ortho and they are calling as pt has  STAT CT scan  if she is ok to proceed with procedure

## 2024-02-05 NOTE — DISCHARGE INSTR - AVS FIRST PAGE
Total Knee Replacement     WHAT YOU SHOULD KNOW:   Total knee replacement is surgery to replace a knee joint damaged by wear, injury, or disease. It is also called a total knee arthroplasty. The knee joint is where your femur (thigh bone) and tibia (large lower leg bone or shin bone) meet. A small bone called the patella (kneecap) protects your knee joint.            AFTER YOU LEAVE:     Medicines:   Pain medicine:  You may be given a prescription medicine to decrease pain. Do not wait until the pain is severe before you take this medicine. We recommend that you take Tylenol 975mg every 8 hours for baseline pain control. Oxycodone can be used as needed, but no more than 1-2 tablets every 6 hours. Due to your other prescriptions, you will also be given narcan    Stool softeners  make it easier for you to have a bowel movement. You may need this medicine to treat or prevent constipation.  You will be given Colace 100mg to take twice a day    Anticoagulants  are a type of blood thinner medicine that helps prevent clots. Clots can cause strokes, heart attacks, and death. These medicines may cause you to bleed or bruise more easily.  You will be given aspirin 325 mg to take twice a day for 6 weeks after surgery.    Watch for bleeding from your gums or nose. Watch for blood in your urine and bowel movements. Use a soft washcloth and a soft toothbrush. If you shave, use an electric razor. Avoid activities that can cause bruising or bleeding.    Take your medicine as directed.  Call your healthcare provider if you think your medicine is not helping or if you have side effects. Tell him if you are allergic to any medicine. Keep a list of the medicines, vitamins, and herbs you take. Include the amounts, and when and why you take them. Bring the list or the pill bottles to follow-up visits. Carry your medicine list with you in case of an emergency.    Follow up with your orthopedist as directed:  You may need to return to have  your wound checked and stitches, staples, or drain removed. Write down your questions so you remember to ask them during your visits.  Please make an appointment to see Dr. iKm 2 weeks postoperatively.    Physical therapy:  A physical therapist teaches you exercises to help improve movement and strength, and to decrease pain. You will begin outpatient physical therapy later this week as planned.     Self-care:   Wound Care:  Please leave the Mepilex dressing in place for 7 days after surgery. After 7 days, you may remove the dressing and leave the incision open to air.  If the incision is uncomfortable under clothing after the Mepilex is removed, you may cover it with a a dry sterile dressing. Once the Mepilex dressing has been removed, please keep the incision dry for another week until your follow up appointment.    Use ice:  Ice helps decrease swelling and pain. Ice may also help prevent tissue damage. Use an ice pack or put crushed ice in a plastic bag. Cover it with a towel, and place it on your knee for 15 to 20 minutes every hour as directed.    Use a cane, walker, or crutches as directed:  These devices will help decrease your risk of falling. Your therapist will guide you about transitioning from a walker to cane to no assistive device.    Wear pressure stockings:  These are long, tight stockings that put pressure on your legs to promote blood flow and prevent clots. You may remove the stocking on your non-operative leg when you get home. The stocking on your operative leg should remain on for 2-3 days. Afterwards, you may put the stocking on or off as needed for swelling.             Contact your orthopedist if:  You have a fever over 101.0°F.    You have trouble moving or bending your joint.    You have increased pain and swelling in your joint, even after you take pain medicine.    You have back pain or lower leg pain when you bend your foot upwards.    You have questions or concerns about your  condition or care.    Blood soaks through/saturates your bandage.    Your incision comes apart.    Your incision is red, swollen, or draining pus.    You cannot walk or move your joint, or the limb is numb.    Your leg feels warm, tender, and painful. It may look swollen and red.     Seek immediate care or call 911 if:   You feel like you are going to faint.    You have a seizure or feel confused.     You feel lightheaded, short of breath, and have chest pain.    You have chest pain when you take a deep breath or cough. You may cough up blood.    © 2014 [employer]. Information is for End User's use only and may not be sold, redistributed or otherwise used for commercial purposes. All illustrations and images included in CareNotes® are the copyrighted property of [employer] or [employer].  The above information is an  only. It is not intended as medical advice for individual conditions or treatments. Talk to your doctor, nurse or pharmacist before following any medical regimen to see if it is safe and effective for you.

## 2024-02-05 NOTE — TELEPHONE ENCOUNTER
SPOKE WITH TONEY FROM ORTHO SCHEDULING, INFORMED OFFICE NOTE IS UPDATED, CT SCAN RESULTS STILL PENDING.

## 2024-02-06 ENCOUNTER — HOSPITAL ENCOUNTER (OUTPATIENT)
Facility: HOSPITAL | Age: 69
Setting detail: OUTPATIENT SURGERY
Discharge: HOME | End: 2024-02-07
Attending: ORTHOPAEDIC SURGERY | Admitting: ORTHOPAEDIC SURGERY
Payer: COMMERCIAL

## 2024-02-06 ENCOUNTER — ANESTHESIA (OUTPATIENT)
Dept: PERIOP | Facility: HOSPITAL | Age: 69
End: 2024-02-06
Payer: COMMERCIAL

## 2024-02-06 ENCOUNTER — APPOINTMENT (OUTPATIENT)
Dept: RADIOLOGY | Facility: HOSPITAL | Age: 69
End: 2024-02-06
Payer: COMMERCIAL

## 2024-02-06 DIAGNOSIS — Z96.651 S/P TOTAL KNEE REPLACEMENT NOT USING CEMENT, RIGHT: Primary | ICD-10-CM

## 2024-02-06 LAB — GLUCOSE SERPL-MCNC: 112 MG/DL (ref 65–140)

## 2024-02-06 PROCEDURE — C1776 JOINT DEVICE (IMPLANTABLE): HCPCS | Performed by: ORTHOPAEDIC SURGERY

## 2024-02-06 PROCEDURE — C1713 ANCHOR/SCREW BN/BN,TIS/BN: HCPCS | Performed by: ORTHOPAEDIC SURGERY

## 2024-02-06 PROCEDURE — 27447 TOTAL KNEE ARTHROPLASTY: CPT | Performed by: ORTHOPAEDIC SURGERY

## 2024-02-06 PROCEDURE — 27447 TOTAL KNEE ARTHROPLASTY: CPT | Performed by: PHYSICIAN ASSISTANT

## 2024-02-06 PROCEDURE — 87081 CULTURE SCREEN ONLY: CPT | Performed by: ORTHOPAEDIC SURGERY

## 2024-02-06 PROCEDURE — 82948 REAGENT STRIP/BLOOD GLUCOSE: CPT

## 2024-02-06 PROCEDURE — 99024 POSTOP FOLLOW-UP VISIT: CPT | Performed by: ORTHOPAEDIC SURGERY

## 2024-02-06 PROCEDURE — 97163 PT EVAL HIGH COMPLEX 45 MIN: CPT

## 2024-02-06 PROCEDURE — S2900 ROBOTIC SURGICAL SYSTEM: HCPCS | Performed by: ORTHOPAEDIC SURGERY

## 2024-02-06 PROCEDURE — 97110 THERAPEUTIC EXERCISES: CPT

## 2024-02-06 PROCEDURE — 73560 X-RAY EXAM OF KNEE 1 OR 2: CPT

## 2024-02-06 PROCEDURE — C9290 INJ, BUPIVACAINE LIPOSOME: HCPCS | Performed by: ANESTHESIOLOGY

## 2024-02-06 DEVICE — ATTUNE KNEE SYSTEM FEMORAL POROCOAT CRUCIATE RETAINING SIZE 6 RIGHT CEMENTLESS
Type: IMPLANTABLE DEVICE | Site: KNEE | Status: FUNCTIONAL
Brand: ATTUNE

## 2024-02-06 DEVICE — ATTUNE PATELLA MEDIALIZED DOME 35MM CEMENTED AOX
Type: IMPLANTABLE DEVICE | Site: PATELLA | Status: FUNCTIONAL
Brand: ATTUNE

## 2024-02-06 DEVICE — ATTUNE KNEE SYSTEM TIBIAL INSERT FIXED BEARING MEDIAL STABILIZED RIGHT AOX 6, 6MM
Type: IMPLANTABLE DEVICE | Site: KNEE | Status: FUNCTIONAL
Brand: ATTUNE

## 2024-02-06 DEVICE — ATTUNE KNEE SYSTEM TIBIAL BASE AFFIXIUM FIXED BEARING SIZE 4
Type: IMPLANTABLE DEVICE | Site: KNEE | Status: FUNCTIONAL
Brand: ATTUNE AFFIXIUM

## 2024-02-06 DEVICE — PALACOS® R IS A FAST-CURING, RADIOPAQUE, POLY(METHYL METHACRYLATE)-BASED BONE CEMENT.PALACOS ® R CONTAINS THE X-RAY CONTRAST MEDIUM ZIRCONIUM DIOXIDE. TO IMPROVE VISIBILITY IN THE SURGICAL FIELD PALACOS ® R HAS BEEN COLOURED WITH CHLOROPHYLL (E141). THE BONE CEMENT IS PREPARED DIRECTLY BEFORE USE BY MIXING A POLYMER POWDER COMPONENT WITH A LIQUID MONOMER COMPONENT. A DUCTILE DOUGH FORMS WHICH CURES WITHIN A FEW MINUTES.
Type: IMPLANTABLE DEVICE | Site: PATELLA | Status: FUNCTIONAL
Brand: PALACOS®

## 2024-02-06 RX ORDER — MAGNESIUM HYDROXIDE/ALUMINUM HYDROXICE/SIMETHICONE 120; 1200; 1200 MG/30ML; MG/30ML; MG/30ML
30 SUSPENSION ORAL EVERY 6 HOURS PRN
Status: DISCONTINUED | OUTPATIENT
Start: 2024-02-06 | End: 2024-02-07 | Stop reason: HOSPADM

## 2024-02-06 RX ORDER — PANTOPRAZOLE SODIUM 40 MG/1
40 TABLET, DELAYED RELEASE ORAL DAILY
Status: DISCONTINUED | OUTPATIENT
Start: 2024-02-07 | End: 2024-02-07 | Stop reason: HOSPADM

## 2024-02-06 RX ORDER — GABAPENTIN 300 MG/1
300 CAPSULE ORAL ONCE
Status: DISCONTINUED | OUTPATIENT
Start: 2024-02-06 | End: 2024-02-06 | Stop reason: HOSPADM

## 2024-02-06 RX ORDER — MAGNESIUM HYDROXIDE 1200 MG/15ML
LIQUID ORAL AS NEEDED
Status: DISCONTINUED | OUTPATIENT
Start: 2024-02-06 | End: 2024-02-06 | Stop reason: HOSPADM

## 2024-02-06 RX ORDER — CEFAZOLIN SODIUM 2 G/50ML
2000 SOLUTION INTRAVENOUS EVERY 8 HOURS
Status: COMPLETED | OUTPATIENT
Start: 2024-02-06 | End: 2024-02-07

## 2024-02-06 RX ORDER — PROPOFOL 10 MG/ML
INJECTION, EMULSION INTRAVENOUS CONTINUOUS PRN
Status: DISCONTINUED | OUTPATIENT
Start: 2024-02-06 | End: 2024-02-06

## 2024-02-06 RX ORDER — GABAPENTIN 300 MG/1
300 CAPSULE ORAL 2 TIMES DAILY
Status: DISCONTINUED | OUTPATIENT
Start: 2024-02-06 | End: 2024-02-07 | Stop reason: HOSPADM

## 2024-02-06 RX ORDER — QUETIAPINE FUMARATE 100 MG/1
100 TABLET, FILM COATED ORAL
Status: DISCONTINUED | OUTPATIENT
Start: 2024-02-06 | End: 2024-02-07 | Stop reason: HOSPADM

## 2024-02-06 RX ORDER — FENTANYL CITRATE/PF 50 MCG/ML
50 SYRINGE (ML) INJECTION
Status: DISCONTINUED | OUTPATIENT
Start: 2024-02-06 | End: 2024-02-06 | Stop reason: HOSPADM

## 2024-02-06 RX ORDER — OXYCODONE HYDROCHLORIDE 10 MG/1
10 TABLET ORAL EVERY 4 HOURS PRN
Status: DISCONTINUED | OUTPATIENT
Start: 2024-02-06 | End: 2024-02-07 | Stop reason: HOSPADM

## 2024-02-06 RX ORDER — ASPIRIN 325 MG
325 TABLET ORAL 2 TIMES DAILY
Status: DISCONTINUED | OUTPATIENT
Start: 2024-02-06 | End: 2024-02-07 | Stop reason: HOSPADM

## 2024-02-06 RX ORDER — ONDANSETRON 2 MG/ML
4 INJECTION INTRAMUSCULAR; INTRAVENOUS EVERY 6 HOURS PRN
Status: DISCONTINUED | OUTPATIENT
Start: 2024-02-06 | End: 2024-02-07 | Stop reason: HOSPADM

## 2024-02-06 RX ORDER — SODIUM CHLORIDE 9 MG/ML
INJECTION, SOLUTION INTRAVENOUS AS NEEDED
Status: DISCONTINUED | OUTPATIENT
Start: 2024-02-06 | End: 2024-02-06 | Stop reason: HOSPADM

## 2024-02-06 RX ORDER — ACETAMINOPHEN 325 MG/1
975 TABLET ORAL EVERY 8 HOURS
Status: DISCONTINUED | OUTPATIENT
Start: 2024-02-06 | End: 2024-02-07 | Stop reason: HOSPADM

## 2024-02-06 RX ORDER — FENTANYL CITRATE 50 UG/ML
INJECTION, SOLUTION INTRAMUSCULAR; INTRAVENOUS AS NEEDED
Status: DISCONTINUED | OUTPATIENT
Start: 2024-02-06 | End: 2024-02-06

## 2024-02-06 RX ORDER — CHLORHEXIDINE GLUCONATE ORAL RINSE 1.2 MG/ML
15 SOLUTION DENTAL ONCE
Status: COMPLETED | OUTPATIENT
Start: 2024-02-06 | End: 2024-02-06

## 2024-02-06 RX ORDER — DEXAMETHASONE SODIUM PHOSPHATE 10 MG/ML
INJECTION, SOLUTION INTRAMUSCULAR; INTRAVENOUS AS NEEDED
Status: DISCONTINUED | OUTPATIENT
Start: 2024-02-06 | End: 2024-02-06

## 2024-02-06 RX ORDER — OXYCODONE HYDROCHLORIDE 5 MG/1
5 TABLET ORAL EVERY 4 HOURS PRN
Status: DISCONTINUED | OUTPATIENT
Start: 2024-02-06 | End: 2024-02-07 | Stop reason: HOSPADM

## 2024-02-06 RX ORDER — MIDAZOLAM HYDROCHLORIDE 2 MG/2ML
INJECTION, SOLUTION INTRAMUSCULAR; INTRAVENOUS AS NEEDED
Status: DISCONTINUED | OUTPATIENT
Start: 2024-02-06 | End: 2024-02-06

## 2024-02-06 RX ORDER — LIDOCAINE HYDROCHLORIDE 20 MG/ML
INJECTION, SOLUTION EPIDURAL; INFILTRATION; INTRACAUDAL; PERINEURAL AS NEEDED
Status: DISCONTINUED | OUTPATIENT
Start: 2024-02-06 | End: 2024-02-06

## 2024-02-06 RX ORDER — PHENYLEPHRINE HCL IN 0.9% NACL 1 MG/10 ML
SYRINGE (ML) INTRAVENOUS AS NEEDED
Status: DISCONTINUED | OUTPATIENT
Start: 2024-02-06 | End: 2024-02-06

## 2024-02-06 RX ORDER — TRANEXAMIC ACID 10 MG/ML
1000 INJECTION, SOLUTION INTRAVENOUS ONCE
Status: COMPLETED | OUTPATIENT
Start: 2024-02-06 | End: 2024-02-06

## 2024-02-06 RX ORDER — SODIUM CHLORIDE, SODIUM LACTATE, POTASSIUM CHLORIDE, CALCIUM CHLORIDE 600; 310; 30; 20 MG/100ML; MG/100ML; MG/100ML; MG/100ML
75 INJECTION, SOLUTION INTRAVENOUS CONTINUOUS
Status: DISCONTINUED | OUTPATIENT
Start: 2024-02-06 | End: 2024-02-06

## 2024-02-06 RX ORDER — ONDANSETRON 2 MG/ML
INJECTION INTRAMUSCULAR; INTRAVENOUS AS NEEDED
Status: DISCONTINUED | OUTPATIENT
Start: 2024-02-06 | End: 2024-02-06

## 2024-02-06 RX ORDER — OXYCODONE HCL 10 MG/1
10 TABLET, FILM COATED, EXTENDED RELEASE ORAL ONCE
Status: COMPLETED | OUTPATIENT
Start: 2024-02-06 | End: 2024-02-06

## 2024-02-06 RX ORDER — MIDODRINE HYDROCHLORIDE 5 MG/1
5 TABLET ORAL DAILY
Status: DISCONTINUED | OUTPATIENT
Start: 2024-02-07 | End: 2024-02-07 | Stop reason: HOSPADM

## 2024-02-06 RX ORDER — BUPIVACAINE HYDROCHLORIDE 2.5 MG/ML
INJECTION, SOLUTION EPIDURAL; INFILTRATION; INTRACAUDAL AS NEEDED
Status: DISCONTINUED | OUTPATIENT
Start: 2024-02-06 | End: 2024-02-06 | Stop reason: HOSPADM

## 2024-02-06 RX ORDER — DEXAMETHASONE SODIUM PHOSPHATE 4 MG/ML
10 INJECTION, SOLUTION INTRA-ARTICULAR; INTRALESIONAL; INTRAMUSCULAR; INTRAVENOUS; SOFT TISSUE ONCE
Status: DISCONTINUED | OUTPATIENT
Start: 2024-02-07 | End: 2024-02-07

## 2024-02-06 RX ORDER — CEFAZOLIN SODIUM 2 G/50ML
SOLUTION INTRAVENOUS AS NEEDED
Status: DISCONTINUED | OUTPATIENT
Start: 2024-02-06 | End: 2024-02-06

## 2024-02-06 RX ORDER — ALBUTEROL SULFATE 90 UG/1
2 AEROSOL, METERED RESPIRATORY (INHALATION) EVERY 6 HOURS PRN
Status: DISCONTINUED | OUTPATIENT
Start: 2024-02-06 | End: 2024-02-07 | Stop reason: HOSPADM

## 2024-02-06 RX ORDER — CEFAZOLIN SODIUM 2 G/50ML
2000 SOLUTION INTRAVENOUS ONCE
Status: DISCONTINUED | OUTPATIENT
Start: 2024-02-06 | End: 2024-02-06 | Stop reason: HOSPADM

## 2024-02-06 RX ORDER — SODIUM CHLORIDE 9 MG/ML
75 INJECTION, SOLUTION INTRAVENOUS CONTINUOUS
Status: DISCONTINUED | OUTPATIENT
Start: 2024-02-06 | End: 2024-02-07

## 2024-02-06 RX ORDER — ACETAMINOPHEN 325 MG/1
975 TABLET ORAL ONCE
Status: COMPLETED | OUTPATIENT
Start: 2024-02-06 | End: 2024-02-06

## 2024-02-06 RX ORDER — ONDANSETRON 2 MG/ML
4 INJECTION INTRAMUSCULAR; INTRAVENOUS ONCE AS NEEDED
Status: DISCONTINUED | OUTPATIENT
Start: 2024-02-06 | End: 2024-02-06 | Stop reason: HOSPADM

## 2024-02-06 RX ORDER — SODIUM CHLORIDE, SODIUM LACTATE, POTASSIUM CHLORIDE, CALCIUM CHLORIDE 600; 310; 30; 20 MG/100ML; MG/100ML; MG/100ML; MG/100ML
INJECTION, SOLUTION INTRAVENOUS CONTINUOUS PRN
Status: DISCONTINUED | OUTPATIENT
Start: 2024-02-06 | End: 2024-02-06

## 2024-02-06 RX ORDER — LEVOTHYROXINE SODIUM 0.05 MG/1
50 TABLET ORAL
Status: DISCONTINUED | OUTPATIENT
Start: 2024-02-07 | End: 2024-02-07 | Stop reason: HOSPADM

## 2024-02-06 RX ORDER — BUPIVACAINE HYDROCHLORIDE 7.5 MG/ML
INJECTION, SOLUTION INTRASPINAL AS NEEDED
Status: DISCONTINUED | OUTPATIENT
Start: 2024-02-06 | End: 2024-02-06

## 2024-02-06 RX ORDER — HYDROMORPHONE HCL/PF 1 MG/ML
0.5 SYRINGE (ML) INJECTION EVERY 2 HOUR PRN
Status: DISCONTINUED | OUTPATIENT
Start: 2024-02-06 | End: 2024-02-07 | Stop reason: HOSPADM

## 2024-02-06 RX ORDER — DULOXETIN HYDROCHLORIDE 60 MG/1
60 CAPSULE, DELAYED RELEASE ORAL DAILY
Status: DISCONTINUED | OUTPATIENT
Start: 2024-02-07 | End: 2024-02-07 | Stop reason: HOSPADM

## 2024-02-06 RX ORDER — DOCUSATE SODIUM 100 MG/1
100 CAPSULE, LIQUID FILLED ORAL 2 TIMES DAILY
Status: DISCONTINUED | OUTPATIENT
Start: 2024-02-06 | End: 2024-02-07 | Stop reason: HOSPADM

## 2024-02-06 RX ORDER — BUPIVACAINE HYDROCHLORIDE 5 MG/ML
INJECTION, SOLUTION EPIDURAL; INTRACAUDAL
Status: DISCONTINUED | OUTPATIENT
Start: 2024-02-06 | End: 2024-02-06

## 2024-02-06 RX ADMIN — TRANEXAMIC ACID 1000 MG: 10 INJECTION, SOLUTION INTRAVENOUS at 11:23

## 2024-02-06 RX ADMIN — QUETIAPINE FUMARATE 100 MG: 100 TABLET ORAL at 21:53

## 2024-02-06 RX ADMIN — FENTANYL CITRATE 25 MCG: 50 INJECTION INTRAMUSCULAR; INTRAVENOUS at 12:42

## 2024-02-06 RX ADMIN — ACETAMINOPHEN 975 MG: 325 TABLET ORAL at 17:35

## 2024-02-06 RX ADMIN — ASPIRIN 325 MG: 325 TABLET ORAL at 20:42

## 2024-02-06 RX ADMIN — ACETAMINOPHEN 975 MG: 325 TABLET ORAL at 09:44

## 2024-02-06 RX ADMIN — FENTANYL CITRATE 25 MCG: 50 INJECTION INTRAMUSCULAR; INTRAVENOUS at 12:26

## 2024-02-06 RX ADMIN — ONDANSETRON 4 MG: 2 INJECTION INTRAMUSCULAR; INTRAVENOUS at 11:15

## 2024-02-06 RX ADMIN — BUPIVACAINE HYDROCHLORIDE 5 ML: 5 INJECTION, SOLUTION EPIDURAL; INTRACAUDAL; PERINEURAL at 13:31

## 2024-02-06 RX ADMIN — SODIUM CHLORIDE, SODIUM LACTATE, POTASSIUM CHLORIDE, AND CALCIUM CHLORIDE 75 ML/HR: .6; .31; .03; .02 INJECTION, SOLUTION INTRAVENOUS at 09:43

## 2024-02-06 RX ADMIN — FENTANYL CITRATE 25 MCG: 50 INJECTION INTRAMUSCULAR; INTRAVENOUS at 12:50

## 2024-02-06 RX ADMIN — SODIUM CHLORIDE 75 ML/HR: 0.9 INJECTION, SOLUTION INTRAVENOUS at 17:55

## 2024-02-06 RX ADMIN — PROPOFOL 75 MCG/KG/MIN: 10 INJECTION, EMULSION INTRAVENOUS at 11:10

## 2024-02-06 RX ADMIN — Medication 100 MCG: at 11:47

## 2024-02-06 RX ADMIN — CHLORHEXIDINE GLUCONATE 15 ML: 1.2 SOLUTION ORAL at 09:43

## 2024-02-06 RX ADMIN — CEFAZOLIN SODIUM 2000 MG: 2 SOLUTION INTRAVENOUS at 20:38

## 2024-02-06 RX ADMIN — LIDOCAINE HYDROCHLORIDE 100 MG: 20 INJECTION, SOLUTION EPIDURAL; INFILTRATION; INTRACAUDAL; PERINEURAL at 11:10

## 2024-02-06 RX ADMIN — BUPIVACAINE HYDROCHLORIDE IN DEXTROSE 1.5 ML: 7.5 INJECTION, SOLUTION SUBARACHNOID at 11:07

## 2024-02-06 RX ADMIN — GABAPENTIN 300 MG: 300 CAPSULE ORAL at 17:35

## 2024-02-06 RX ADMIN — MIDAZOLAM 2 MG: 1 INJECTION INTRAMUSCULAR; INTRAVENOUS at 11:00

## 2024-02-06 RX ADMIN — DOCUSATE SODIUM 100 MG: 100 CAPSULE, LIQUID FILLED ORAL at 17:36

## 2024-02-06 RX ADMIN — SODIUM CHLORIDE, SODIUM LACTATE, POTASSIUM CHLORIDE, AND CALCIUM CHLORIDE: .6; .31; .03; .02 INJECTION, SOLUTION INTRAVENOUS at 11:00

## 2024-02-06 RX ADMIN — OXYCODONE HYDROCHLORIDE 10 MG: 10 TABLET, FILM COATED, EXTENDED RELEASE ORAL at 09:45

## 2024-02-06 RX ADMIN — OXYCODONE HYDROCHLORIDE 10 MG: 10 TABLET ORAL at 17:35

## 2024-02-06 RX ADMIN — BUPIVACAINE 20 ML: 13.3 INJECTION, SUSPENSION, LIPOSOMAL INFILTRATION at 13:13

## 2024-02-06 RX ADMIN — CEFAZOLIN SODIUM 2000 MG: 2 SOLUTION INTRAVENOUS at 11:04

## 2024-02-06 RX ADMIN — FENTANYL CITRATE 25 MCG: 50 INJECTION INTRAMUSCULAR; INTRAVENOUS at 11:15

## 2024-02-06 RX ADMIN — DEXAMETHASONE SODIUM PHOSPHATE 10 MG: 10 INJECTION, SOLUTION INTRAMUSCULAR; INTRAVENOUS at 11:15

## 2024-02-06 RX ADMIN — HYDROMORPHONE HYDROCHLORIDE 0.5 MG: 1 INJECTION, SOLUTION INTRAMUSCULAR; INTRAVENOUS; SUBCUTANEOUS at 20:43

## 2024-02-06 NOTE — ANESTHESIA PROCEDURE NOTES
Spinal Block    Patient location during procedure: OR  Start time: 2/6/2024 11:07 AM  Reason for block: procedure for pain, at surgeon's request, post-op pain management and primary anesthetic  Staffing  Performed by: Shyam Mann MD  Authorized by: Shyam Mann MD    Preanesthetic Checklist  Completed: patient identified, IV checked, site marked, risks and benefits discussed, surgical consent, monitors and equipment checked, pre-op evaluation and timeout performed  Spinal Block  Patient position: sitting  Prep: ChloraPrep  Patient monitoring: heart rate and continuous pulse ox  Approach: midline  Location: L2-3  Injection technique: single-shot  Needle  Needle type: pencil-tip   Needle gauge: 25 G  Needle length: 5 cm  Assessment  Injection Assessment:  negative aspiration for heme, no paresthesia on injection and positive aspiration for clear CSF.  Post-procedure:  site cleaned

## 2024-02-06 NOTE — PHYSICAL THERAPY NOTE
"       PHYSICAL THERAPY EVALUATION/TREATMENT       02/06/24 3013   Note Type   Note type Evaluation   Pain Assessment   Pain Assessment Tool 0-10   Pain Score 7   Pain Location/Orientation Orientation: Right;Location: Knee   Pain Onset/Description Onset: Ongoing;Descriptor: Sore;Descriptor: Burning   Effect of Pain on Daily Activities limits rest/mobility   Patient's Stated Pain Goal No pain   Hospital Pain Intervention(s) Repositioned;Ambulation/increased activity;Cold applied   Multiple Pain Sites No   Restrictions/Precautions   Weight Bearing Precautions Per Order Yes   RLE Weight Bearing Per Order WBAT   Other Precautions Fall Risk;Pain   Home Living   Type of Home House   Home Layout One level;Performs ADLs on one level;Able to live on main level with bedroom/bathroom;Stairs to enter with rails  (2 RODOLFO)   Bathroom Shower/Tub Tub/shower unit   Bathroom Toilet Standard   Bathroom Equipment Grab bars in shower   Home Equipment Walker;Cane   Prior Function   Level of [place] Independent with ADLs;Independent with functional mobility;Independent with IADLS   Lives With Spouse   Receives Help From Family   IADLs Independent with driving;Independent with meal prep;Independent with medication management   Falls in the last 6 months 0   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)   General   Additional Pertinent History Pt is a 68 year-old female who was admitted to the hospital today (2/6/24) now seen POD #0 s/p R TKA   Family/Caregiver Present No   Cognition   Overall Cognitive Status WFL   Arousal/Participation Cooperative   Orientation Level Oriented X4   Following Commands Follows all commands and directions without difficulty   Subjective   Subjective \"It really hurts in the back of my knee\"   RLE Assessment   RLE Assessment   (Hip/ankle at least 3/5 ; Knee ext 1/5 ; R knee ROM 2-85 degrees)   LLE Assessment   LLE Assessment WFL   Bed Mobility   Supine to Sit 5  Supervision "   Additional items Assist x 1;Verbal cues;Increased time required;Bedrails   Sit to Supine Unable to assess   Additional Comments transferred to bedside chair   Transfers   Sit to Stand 4  Minimal assistance   Additional items Assist x 1;Verbal cues   Stand to Sit 4  Minimal assistance   Additional items Assist x 1;Verbal cues;Armrests   Stand pivot 4  Minimal assistance   Additional items Assist x 1;Verbal cues   Ambulation/Elevation   Gait pattern Antalgic;R Knee Richa;Decreased R stance;Short stride;Step to   Gait Assistance 4  Minimal assist   Additional items Assist x 1;Verbal cues   Assistive Device Rolling walker   Distance 2 feet forward/backward from chair x 2   Stair Management Assistance Not tested   Ambulation/Elevation Additional Comments Pt with R knee buckling when performing side steps to chair ; Improved R knee control when stepping forward from chair   Balance   Static Sitting Good   Dynamic Sitting Fair +   Static Standing Fair -   Dynamic Standing Poor +   Ambulatory Poor +  (RW)   Activity Tolerance   Activity Tolerance Patient tolerated treatment well;Patient limited by pain   Nurse Made Aware yes RN Yajaira   Assessment   Prognosis Good   Problem List Decreased strength;Decreased range of motion;Decreased endurance;Impaired balance;Decreased mobility;Decreased safety awareness;Pain;Decreased skin integrity   Assessment Patient seen for Physical Therapy evaluation. Patient admitted with s/p R TKA.  Comorbidities affecting patient's physical performance include: anxiety, arthritis, asthma, depression, GERD, hypertension, hyperlipidemia, neuropathy, osteoarthritis, osteoporosis, TIA, and vertigo.  Personal factors affecting patient at time of initial evaluation include: lives in 1 story house, ambulating with assistive device, stairs to enter home, inability to ambulate household distances, inability to navigate community distances, inability to navigate level surfaces without external assistance,  anxiety, depression, inability to perform ADLS, inability to perform IADLS , and inability to live alone. Prior to admission, patient was independent with functional mobility with use of cane over the last 2 weeks, independent with ADLS, independent with IADLS, living with spouse in a 1 level home with 2 steps to enter, ambulating household distance, ambulating community distances, and works full time.  Please find objective findings from Physical Therapy assessment regarding body systems outlined above with impairments and limitations including weakness, decreased ROM, impaired balance, decreased endurance, gait deviations, pain, decreased activity tolerance, decreased functional mobility tolerance, decreased safety awareness, fall risk, and decreased skin integrity.  The Barthel Index was used as a functional outcome tool presenting with a score of Barthel Index Score: 40 today indicating marked limitations of functional mobility and ADLS.  Patient's clinical presentation is currently unstable/unpredictable as seen in patient's presentation of changing level of pain, increased fall risk, new onset of impairment of functional mobility, decreased endurance, and new onset of weakness. Pt would benefit from continued Physical Therapy treatment to address deficits as defined above and maximize level of functional mobility. As demonstrated by objective findings, the assigned level of complexity for this evaluation is high.The patient's -[facility] Basic Mobility Inpatient Short Form Raw Score is 16. A Raw score of less than or equal to 16 suggests the patient may benefit from discharge to post-acute rehabilitation services, however expect pt to progress post-operatively with plan for OP PT later this week. Please also refer to the recommendation of the Physical Therapist for safe discharge planning.   Goals   Patient Goals to decrease R knee pain and improve ability to walk   STG Expiration Date 02/13/24   Short Term Goal #1  Pt will be I with HEP ; Pt will perform bed mobility/transfers IND ; Standing balance will improve to Fair to decrease risk of falls ; RLE strength will improve by 1/2 grade, R knee TROM 0-90 degrees to improve tolerance to functional mobility ; Pt will ambulate x 50 feet Mod I with RW ; Pt will negotiate x 2 steps with supervision + rail/cane to safely enter/exit home ; AMPAC score will improve >18/24 to demonstrate improved functional independence   [facility] Expiration Date 02/20/24   Long Term Goal #1 Standing balance will improve to Fair+ to decrease risk of falls ; RLE strength will improve by 1 grade, R knee TROM 0-95 degrees to improve tolerance to functional mobility ; Pt will ambulate x 150 feet Mod I with RW ; Pt will negotiate x 2 steps Mod I + rail/cane to safely enter/exit home ; AMPAC score will improve >20/24 to demonstrate improved functional independence   Plan   Treatment/Interventions Functional transfer training;LE strengthening/ROM;Therapeutic exercise;Endurance training;Bed mobility;Gait training;Spoke to nursing;ADL retraining   PT Frequency Twice a day   Discharge Recommendation   Rehab Resource Intensity Level, PT III (Minimum Resource Intensity)   AM-PAC Basic Mobility Inpatient   Turning in Flat Bed Without Bedrails 3   Lying on Back to Sitting on Edge of Flat Bed Without Bedrails 3   Moving Bed to Chair 3   Standing Up From Chair Using Arms 3   Walk in Room 3   Climb 3-5 Stairs With Railing 1   Basic Mobility Inpatient Raw Score 16   Basic Mobility Standardized Score 38.32   Highest Level Of Mobility   -HL Goal 5: Stand one or more mins   -HL Achieved 5: Stand (1 or more minutes)   Barthel Index   Feeding 10   Bathing 0   Grooming Score 0   Dressing Score 5   Bladder Score 0   Bowels Score 10   Toilet Use Score 5   Transfers (Bed/Chair) Score 10   Mobility (Level Surface) Score 0   Stairs Score 0   Barthel Index Score 40   Additional Treatment Session   Start Time 1736   End Time 1746    Treatment Assessment S: Pt agreeable to PT session this afternoon. O/A: Pt able to perform exercise as mentioned below with intermittent assist to improve ROM. Exercise handout provided and reviewed with pt - pt verbalized understanding. P: pt will benefit from skilled PT to address deficits to maximize IND for safe d/c, plan to start OP PT later this week   Equipment Use walker   Exercises   TKR Supine;Sitting;Right  (Ankle pumps, quad/glute sets, LAQ, heel slides x 10 reps R)   End of Consult   Patient Position at End of Consult Bedside chair;All needs within reach   Licensure   [place] License Number  Tracy Vaughn [id number]

## 2024-02-06 NOTE — ANESTHESIA POSTPROCEDURE EVALUATION
Post-Op Assessment Note    CV Status:  Stable  Pain Score: 0    Pain management: adequate       Mental Status:  Sleepy and arousable   Hydration Status:  Euvolemic   PONV Controlled:  Controlled   Airway Patency:  Patent     Post Op Vitals Reviewed: Yes    No anethesia notable event occurred.    Staff: Anesthesiologist, CRNA               /74 (02/06/24 1302)    Temp 97.6 °F (36.4 °C) (02/06/24 1302)    Pulse 103 (02/06/24 1302)   Resp 12 (02/06/24 1302)    SpO2 100 % (02/06/24 1302)

## 2024-02-06 NOTE — PROGRESS NOTES
"Progress Note - Orthopedics   Ina Tolbert 68 y.o. female MRN: [id number]  Unit/Bed#: OR POOL Encounter: [id number]    Assessment:  1)POD#0 s/p RA R TKA    Plan:  Ancef 2g IV x 2 for 24 hours postop  DVT prophylaxis: ASA 325mg PO BID/SCD's/Ambulation  WBAT RLE  PT/OT- WBAT RLE  Analgesia PRN  Follow up AM labs  D/c oden in AM POD #1  Dressing- monitor for drainage  Discharge planning -disposition pending progress with PT postoperatively.  Plan for discharge to home to start outpatient physical therapy later this week.    Weight bearing: WBAT RLE    VTE Pharmacologic Prophylaxis: ASA 325mg PO BID  VTE Mechanical Prophylaxis: sequential compression device    Subjective: Patient seen and examined postoperatively in PACU.  Pain controlled.  Events of surgery discussed with the patient and the patient's  via telephone    Vitals: Blood pressure 134/76, pulse 95, temperature 97.6 °F (36.4 °C), resp. rate 18, height 5' 5\" (1.651 m), weight 75.9 kg (167 lb 5.3 oz), SpO2 96%.,Body mass index is 27.85 kg/m².      Intake/Output Summary (Last 24 hours) at 2/6/2024 1410  Last data filed at 2/6/2024 1249  Gross per 24 hour   Intake 950 ml   Output 110 ml   Net 840 ml       Invasive Devices       Peripheral Intravenous Line  Duration             Peripheral IV 11/16/23 Dorsal (posterior);Left Forearm 82 days    Peripheral IV 02/06/24 Left Forearm <1 day              Drain  Duration             Urethral Catheter Latex 16 Fr. <1 day                    Physical Exam: NAD  Ortho Exam: RLE: Dsg c/d/i, compartments soft, calf non-tender, +PF/DF/EHL, +DP/SP/Saph/Sural SILT, DP 2+, foot warm    Lab, Imaging and other studies: PO XR R knee: Reviewed and acceptable    Jairon Kim D.O.  Division of Adult Reconstruction  Department of Orthopaedic Surgery  [facility]        "

## 2024-02-06 NOTE — OP NOTE
OPERATIVE REPORT  PATIENT NAME: Ina Tolbert  : 1955  MRN: [id number]  Pt Location:  [facility] OR ROOM 01    Surgery Date: 2024    Surgeons and Role:     * Jairon Kim, DO - Primary     * Blaze Botello PA-C - Assisting, no qualified resident was available an assistant was needed for patient positioning, prepping and draping, soft tissue retraction, protection of vital structures throughout the case, exposure of the femur and tibia for robotic assisted resection and implantation of final prosthesis, superficial closure, and to complete the case safely.      * Norris Zarate,  ATC/OTC - 2nd Assist    Preop Diagnosis:  Primary osteoarthritis of right knee [M17.11]  Chronic pain of right knee [M25.561, G89.29]    Postop diagnosis:  Primary osteoarthritis of right knee [M17.11]  Chronic pain of right knee [M25.561, G89.29]    Procedure(s):  Right - ARTHROPLASTY KNEE TOTAL W ROBOT - PRESS FIT     Specimens:  * No specimens in log *    Estimated Blood Loss:   10 mL    Drains: None  Urethral Catheter Latex 16 Fr. (Active)   Number of days: 0       Anesthesia Type:   Spinal with sedation, postoperative adductor canal block with Exparel    Intravenous fluids: 700 cc    Antibiotics: Ancef 2 g    Urine output: Echavarria: 100 cc    Tourniquet time: 48 minutes at 250 mmHg    Implant Name Type Inv. Item Serial No.  Lot No. LRB No. Used Action   CEMENT BN 40 GM PALACOS RADIOPAQUE W/O ANTIBIOTIC - [id number]  CEMENT BN 40 GM PALACOS RADIOPAQUE W/O ANTIBIOTIC  [employer] [id number] Right 1 Implanted   COMPONENT PATELLA 35MM MEDIAL DOME ATTUNE - [id number]  COMPONENT PATELLA 35MM MEDIAL DOME ATTUNE  DEPUY [id number] Right 1 Implanted   INSERT TIB SZ 6 6MM RT MED STAB ATTUNE - [id number]  INSERT TIB SZ 6 6MM RT MED STAB ATTUNE  DEPUY [id number] Right 1 Implanted   BASEPLATE TIBIAL SZ 4 FX BRNG  ATTUNE - [id number]  BASEPLATE TIBIAL SZ 4 FX BRNG  ATTUNE  DEPUY [id number] Right 1 Implanted   COMPONENT FEM SZ 6  RT  CR ATTUNE - [id number]  COMPONENT FEM SZ 6 RT  CR ATTUNE  DEPUY [id number] Right 1 Implanted     Operative Indications:  Primary osteoarthritis of right knee [M17.11]  Chronic pain of right knee [M25.561, G89.29]  Patient is a very pleasant 68-year-old female known to me for treatment of her activity related right knee pain.  The patient has attempted multiple forms of conservative measure treatment and all have failed to provide long-lasting relief of her discomfort.  With failed conservative treatment, persistent activity related pain, and end-stage osteoarthritis on x-ray recommended that she undergo robotic assisted right total knee arthroplasty.  She was agreeable to this.  Consents were signed and placed in the chart.  Patient was optimized by her medical subspecialist in preparation for the procedure.  Patient underwent robotic assisted right total knee arthroplasty on 2/6/2024.    Operative Findings:  Intraoperatively the patient was found to have a range of motion from 0 to 135 degrees with a 5 degree valgus deformity of the right lower extremity.  There was grade 4 degenerative change in all 3 compartments of the knee.  Ultimately robotic assisted right total knee arthroplasty was successfully performed without complication.  The final construct had excellent range of motion from 0 to 135 degrees with excellent stability at 0 degrees 30 degrees and 90 degrees.  The limb was restored to 2 degrees of valgus alignment.  The patella tracked midline and flat.  After closure of the arthrotomy range of motion, stability, patellar tracking were unchanged.  Patient tolerated procedure well.  There were no complications.    Complications:   None    Knee Approach: Medial Parapatellar    Procedure and Technique:  The patient was identified and marked in the preoperative holding area. Final questions were addressed. Consents were visualized for correct laterality and the intended procedure. Patient was taken back to  the operating room where they were transferred to the operating room table and administered spinal anesthesia by the anesthesia staff. Tourniquet was then applied to the right thigh. The right lower extremity was prepped and draped in the standard sterile fashion with the addition of the knee positioner.  The patient was administered 2 g of IV Ancef. Tranexamic acid was administered by the anesthesia staff.  A final timeout was performed and all members of the operating room staff were in agreement of the intended procedure and the correct laterality was confirmed.  An anterior knee incision was marked over the anterior right knee and carried over the medial portion of the patella down medial to the tibial tubercle.  The leg was exsanguinated and the tourniquet was inflated to 250 mmHg.  An incision was made over the anterior knee using a scalpel, and sharp dissection was carried deep through Haydee's fascia creating full thickness flaps.  The extensor mechanism was identified.  A standard medial parapatellar arthrotomy was performed using a scalpel, and upon doing so benign-appearing yellow intra-articular fluid was expressed.  The anterior horn of the medial and lateral meniscus was removed. 50% of the patellar fat pad was removed for proper exposure. The distal arthrotomy was used to initiate a medial release around the proximal tibia at the joint line to the mid coronal plane.  On inspection there was diffuse grade 4 degenerative change in the medial compartment, lateral compartment, and patellofemoral compartment. Preparations were made for robotic assisted total knee arthroplasty.  The periarticular osteophytes were removed from around the distal femur, proximal tibia, and intercondylar notch.  The remnants of the ACL and the PCL were removed electrocautery.  The visualized portions of the medial and lateral meniscus were removed.  The distal femur and proximal tibial arrays were placed without complication.   The checkpoints were created the distal femur proximal tibia.  The hip center was captured.  Anatomic registration was carried out in sequence.  Range of motion was evaluated and the knee was in 5 degrees of valgus, and the range of motion was from 0-135 degrees.  The Proadjust graph was created.  Through manipulating the position of the femoral and tibial implants a well-balanced Proadjust graph was created and this was excepted as the intraoperative plan.  The robot was brought into the surgical field.  The retractors were placed around the distal femur proximal tibia.  Robotic assisted resection was performed of the distal femur in sequence without damage to the periarticular tissues.  The tibia was subluxed anteriorly and the proximal tibia was resected without complication.  All bony fragments were removed and resection appeared to be complete.  The knee was brought out into the full extension in the posterior compartment was evaluated.  The remnants of the medial and lateral meniscus were removed with electrocautery.  Based on the intraoperative plan a size 6 femoral notch guide was placed and the trochlea was prepared.  The size 6 right CR trial was placed upon the distal femur and a size 6 polyethylene medial stabilized insert was placed into the articulation.  The knee was cycled through a range of motion and is range of motion was evaluated.  The overall alignment of the limb was restored to 2 degrees of valgus, and range of motion was from 0-135 degrees.  This construct had excellent stability at 0 degrees, 30 degrees, 90 degrees.  This was deemed to be the final construct.  The arrays were removed from the distal femur proximal tibia without complication.  The lug holes in the femur were drilled.  The trials were removed and the tibia was subluxed anteriorly.  The tibia was sized and was found that a size 4 base plate would be appropriate.  This was aligned with the medial 1/3 of the proximal tibial  tubercle and pinned into place.  The tibia was then reamed, broached, and finished in sequence.  The base plate was removed.    Attention was then turned to the patella. The osteo synovial reflection was revealed using a Bovie. The patella height measured 23 millimeters prior to resection. The patella was sized and found to be a 35 mm patellar button. The cutting guide was set for the appropriate depth and the patella was evenly resected using oscillating saw. The 35 mm patellar button was then medialized over the patella and the lug holes were drilled. A trial patella button was placed upon the patella and the pre osteotomy patellar height was restored.  A lateral facetectomy of the patella was performed. The soft tissues of the posterior capsule were cauterized using Bovie to ensure hemostasis. The posterior capsule and medial and lateral periarticular soft tissues were injected with a periarticular analgesic cocktail.     The knee was again irrigated in preparation for implantation.  The tibia was subluxed and all retractors were in placed.  The final size 4 fixed-bearing press-fit tibial component was impacted upon the proximal tibia down to its final position where it had excellent bony apposition.  The final size 6 mm medial stabilized AOX CR polyethylene insert was locked into the tibial baseplate.  The femur was elevated and the final size 6 right CR press-fit CR femoral component was impacted upon the distal femur down to its final position where it had excellent bony apposition.  1 bag of Palacos cement without gentamicin was mixed on the back table and the final size 35 medialized patella button was cemented upon the patella.  As the cement cured the remainder of the periarticular pain cocktail was injected into the soft tissues around the knee.  Irrigation then followed with IrriSept followed by normal saline solution.  After the cement had cured the joint was inspected for any residual loose bodies of  cement and these were removed. The tourniquet was released after 48 minutes at 250 mmHg.     The tracking and stability of the final components were assessed. The patella tracked midline without tilt, and the knee was stable in both flexion and extension, coronal stability was unchanged, and the knee demonstrated range of motion from 0-135°.  The knee was again irrigated and a very conservative partial synovectomy was performed.  Hemostasis was achieved using electrocautery.       Closure began using a #1 barbed bidirectional suture for the arthrotomy.  After closure of the arthrotomy range of motion to gravity was tested and was found to be 0-135° of flexion. Quarter percent Marcaine plain was injected in the subcutaneous soft tissues.  The deep subcutaneous tissues were reapproximated with undyed 0 Vicryl, the superficial subcutaneous tissues were reapproximated with undyed 2-0 Vicryl, the skin edges reapproximated with a running 3-0 bidirectional barbed Monocryl suture, and the skin edges were sealed with Exofin.  After the Exofin had dried a silver impregnated Mepilex dressing was placed over the incision.  The drapes were removed and SIDNEY stockings were placed on the bilateral lower extremities. Patient was then awakened from anesthesia without difficulty, and transferred to PACU in stable condition.      I was present for all critical portions of the procedure.    Patient Disposition:  PACU         SIGNATURE: Jairon Kim DO  DATE: February 6, 2024  TIME: 2:03 PM

## 2024-02-06 NOTE — PLAN OF CARE
Problem: PAIN - ADULT  Goal: Verbalizes/displays adequate comfort level or baseline comfort level  Description: Interventions:  - Encourage patient to monitor pain and request assistance  - Assess pain using appropriate pain scale  - Administer analgesics based on type and severity of pain and evaluate response  - Implement non-pharmacological measures as appropriate and evaluate response  - Consider cultural and social influences on pain and pain management  - Notify physician/advanced practitioner if interventions unsuccessful or patient reports new pain  Outcome: Progressing     Problem: INFECTION - ADULT  Goal: Absence or prevention of progression during hospitalization  Description: INTERVENTIONS:  - Assess and monitor for signs and symptoms of infection  - Monitor lab/diagnostic results  - Monitor all insertion sites, i.e. indwelling lines, tubes, and drains  - Monitor endotracheal if appropriate and nasal secretions for changes in amount and color  - [place] appropriate cooling/warming therapies per order  - Administer medications as ordered  - Instruct and encourage patient and family to use good hand hygiene technique  - Identify and instruct in appropriate isolation precautions for identified infection/condition  Outcome: Progressing     Problem: SAFETY ADULT  Goal: Patient will remain free of falls  Description: INTERVENTIONS:  - Educate patient/family on patient safety including physical limitations  - Instruct patient to call for assistance with activity   - Consult OT/PT to assist with strengthening/mobility   - Keep Call bell within reach  - Keep bed low and locked with side rails adjusted as appropriate  - Keep care items and personal belongings within reach  - Initiate and maintain comfort rounds  - Make Fall Risk Sign visible to staff  - Offer Toileting every  Hours, in advance of need  - Initiate/Maintain alarm  - Obtain necessary fall risk management equipment:   - Apply yellow socks and  bracelet for high fall risk patients  - Consider moving patient to room near nurses station  Outcome: Progressing     Problem: DISCHARGE PLANNING  Goal: Discharge to home or other facility with appropriate resources  Description: INTERVENTIONS:  - Identify barriers to discharge w/patient and caregiver  - Arrange for needed discharge resources and transportation as appropriate  - Identify discharge learning needs (meds, wound care, etc.)  - Arrange for interpretive services to assist at discharge as needed  - Refer to Case Management Department for coordinating discharge planning if the patient needs post-hospital services based on physician/advanced practitioner order or complex needs related to functional status, cognitive ability, or social support system  Outcome: Progressing     Problem: SKIN/TISSUE INTEGRITY - ADULT  Goal: Incision(s), wounds(s) or drain site(s) healing without S/S of infection  Description: INTERVENTIONS  - Assess and document dressing, incision, wound bed, drain sites and surrounding tissue  - Provide patient and family education  - Perform skin care/dressing changes every   Outcome: Progressing     Problem: MUSCULOSKELETAL - ADULT  Goal: Maintain or return mobility to safest level of function  Description: INTERVENTIONS:  - Assess patient's ability to carry out ADLs; assess patient's baseline for ADL function and identify physical deficits which impact ability to perform ADLs (bathing, care of mouth/teeth, toileting, grooming, dressing, etc.)  - Assess/evaluate cause of self-care deficits   - Assess range of motion  - Assess patient's mobility  - Assess patient's need for assistive devices and provide as appropriate  - Encourage maximum independence but intervene and supervise when necessary  - Involve family in performance of ADLs  - Assess for home care needs following discharge   - Consider OT consult to assist with ADL evaluation and planning for discharge  - Provide patient education as  appropriate  Outcome: Progressing  Goal: Maintain proper alignment of affected body part  Description: INTERVENTIONS:  - Support, maintain and protect limb and body alignment  - Provide patient/ family with appropriate education  Outcome: Progressing

## 2024-02-06 NOTE — INTERVAL H&P NOTE
H&P reviewed. After examining the patient I find no changes in the patients condition since the H&P had been written.  Patient was seen and examined at bedside this morning.  She states her activity related right knee pain continues to be severe and radiates diffusely through the right knee.  Please see exam below.  She denies any recent changes in her overall medical health.  She denies any recent fever, chills, nausea, vomiting, headache, chest pain, trouble breathing.  She has been seen and cleared by her medical subspecialist in preparation for the procedure.  She will be a good candidate for aspirin postoperatively for DVT prophylaxis.  She will be a good candidate for outpatient physical therapy following her discharge from the hospital.  All questions addressed this morning.  Proceed the OR for robotic assisted right total knee arthroplasty.    Vital signs will be reviewed prior to the case    Right Knee Exam      Tenderness   The patient is experiencing tenderness in the medial joint line, lateral joint line and patella.     Range of Motion   Extension:  0 normal   Flexion:  120 normal      Tests   Varus: negative Valgus: negative  Drawer:  Anterior - negative    Posterior - negative  Patellar apprehension: negative     Other   Erythema: absent  Scars: absent  Sensation: normal  Pulse: present  Swelling: none  Effusion: effusion (scant) present     Comments:  Valgus deformity passively correctable  Stable at 0, 30 and 90 degrees  Neurovascular intact distally  No warmth or erythema  Parapatellar crepitance noted  Patellofemoral grind: Positive    Jairon Kim D.O.  Division of Adult Reconstruction  Department of Orthopaedic Surgery  [facility]

## 2024-02-06 NOTE — PLAN OF CARE
Problem: PHYSICAL THERAPY ADULT  Goal: Performs mobility at highest level of function for planned discharge setting.  See evaluation for individualized goals.  Description: Treatment/Interventions: Functional transfer training, LE strengthening/ROM, Therapeutic exercise, Endurance training, Bed mobility, Gait training, Spoke to nursing, ADL retraining          See flowsheet documentation for full assessment, interventions and recommendations.  Note: Prognosis: Good  Problem List: Decreased strength, Decreased range of motion, Decreased endurance, Impaired balance, Decreased mobility, Decreased safety awareness, Pain, Decreased skin integrity  Assessment: Patient seen for Physical Therapy evaluation. Patient admitted with s/p R TKA.  Comorbidities affecting patient's physical performance include: anxiety, arthritis, asthma, depression, GERD, hypertension, hyperlipidemia, neuropathy, osteoarthritis, osteoporosis, TIA, and vertigo.  Personal factors affecting patient at time of initial evaluation include: lives in 1 story house, ambulating with assistive device, stairs to enter home, inability to ambulate household distances, inability to navigate community distances, inability to navigate level surfaces without external assistance, anxiety, depression, inability to perform ADLS, inability to perform IADLS , and inability to live alone. Prior to admission, patient was independent with functional mobility with use of cane over the last 2 weeks, independent with ADLS, independent with IADLS, living with spouse in a 1 level home with 2 steps to enter, ambulating household distance, ambulating community distances, and works full time.  Please find objective findings from Physical Therapy assessment regarding body systems outlined above with impairments and limitations including weakness, decreased ROM, impaired balance, decreased endurance, gait deviations, pain, decreased activity tolerance, decreased functional  mobility tolerance, decreased safety awareness, fall risk, and decreased skin integrity.  The Barthel Index was used as a functional outcome tool presenting with a score of Barthel Index Score: 40 today indicating marked limitations of functional mobility and ADLS.  Patient's clinical presentation is currently unstable/unpredictable as seen in patient's presentation of changing level of pain, increased fall risk, new onset of impairment of functional mobility, decreased endurance, and new onset of weakness. Pt would benefit from continued Physical Therapy treatment to address deficits as defined above and maximize level of functional mobility. As demonstrated by objective findings, the assigned level of complexity for this evaluation is high.The patient's AM-[facility] Basic Mobility Inpatient Short Form Raw Score is 16. A Raw score of less than or equal to 16 suggests the patient may benefit from discharge to post-acute rehabilitation services, however expect pt to progress post-operatively with plan for OP PT later this week. Please also refer to the recommendation of the Physical Therapist for safe discharge planning.        Rehab Resource Intensity Level, PT: III (Minimum Resource Intensity)    See flowsheet documentation for full assessment.

## 2024-02-06 NOTE — ANESTHESIA PROCEDURE NOTES
Peripheral Block    Patient location during procedure: PACU  Start time: 2/6/2024 1:31 PM  Reason for block: procedure for pain, at surgeon's request and post-op pain management  Staffing  Performed by: Shyam Mann MD  Authorized by: Shyam Mann MD    Preanesthetic Checklist  Completed: patient identified, IV checked, site marked, risks and benefits discussed, surgical consent, monitors and equipment checked, pre-op evaluation and timeout performed  Peripheral Block  Patient position: supine  Prep: ChloraPrep  Patient monitoring: continuous pulse oximetry, frequent blood pressure checks and heart rate  Block type: Adductor Canal  Laterality: right  Injection technique: single-shot  Procedures: ultrasound guided, Ultrasound guidance required for the procedure to increase accuracy and safety of medication placement and decrease risk of complications.  Ultrasound permanent image savedbupivacaine (PF) (MARCAINE) 0.5 % injection 20 mL - Perineural   5 mL - 2/6/2024 1:31:00 PM  bupivacaine liposomal (EXPAREL) 1.3 % injection 20 mL - Perineural   20 mL - 2/6/2024 1:13:00 PM  Needle  Needle type: Stimuplex   Needle gauge: 20 G  Needle length: 4 in  Needle localization: ultrasound guidance  Assessment  Injection assessment: frequent aspiration, injected with ease, negative aspiration, no paresthesia on injection, no symptoms of intraneural/intravenous injection, negative for heart rate change, needle tip visualized at all times and incremental injection  Paresthesia pain: none  Post-procedure:  site cleaned  patient tolerated the procedure well with no immediate complications

## 2024-02-07 ENCOUNTER — PATIENT OUTREACH (OUTPATIENT)
Dept: OBGYN CLINIC | Facility: HOSPITAL | Age: 69
End: 2024-02-07

## 2024-02-07 VITALS
DIASTOLIC BLOOD PRESSURE: 76 MMHG | WEIGHT: 167.33 LBS | BODY MASS INDEX: 27.88 KG/M2 | OXYGEN SATURATION: 97 % | RESPIRATION RATE: 17 BRPM | HEART RATE: 89 BPM | HEIGHT: 65 IN | SYSTOLIC BLOOD PRESSURE: 132 MMHG | TEMPERATURE: 97.6 F

## 2024-02-07 PROBLEM — Z96.651 S/P TOTAL KNEE REPLACEMENT NOT USING CEMENT, RIGHT: Status: ACTIVE | Noted: 2024-02-07

## 2024-02-07 LAB
ANION GAP SERPL CALCULATED.3IONS-SCNC: 9 MMOL/L
BASOPHILS # BLD AUTO: 0.01 THOUSANDS/ÂΜL (ref 0–0.1)
BASOPHILS NFR BLD AUTO: 0 % (ref 0–1)
BUN SERPL-MCNC: 19 MG/DL (ref 5–25)
CALCIUM SERPL-MCNC: 8.9 MG/DL (ref 8.4–10.2)
CHLORIDE SERPL-SCNC: 108 MMOL/L (ref 96–108)
CO2 SERPL-SCNC: 19 MMOL/L (ref 21–32)
CREAT SERPL-MCNC: 0.63 MG/DL (ref 0.6–1.3)
EOSINOPHIL # BLD AUTO: 0 THOUSAND/ÂΜL (ref 0–0.61)
EOSINOPHIL NFR BLD AUTO: 0 % (ref 0–6)
ERYTHROCYTE [DISTWIDTH] IN BLOOD BY AUTOMATED COUNT: 13 % (ref 11.6–15.1)
GFR SERPL CREATININE-BSD FRML MDRD: 93 ML/MIN/1.73SQ M
GLUCOSE P FAST SERPL-MCNC: 152 MG/DL (ref 65–99)
GLUCOSE SERPL-MCNC: 152 MG/DL (ref 65–140)
HCT VFR BLD AUTO: 32.1 % (ref 34.8–46.1)
HGB BLD-MCNC: 10.9 G/DL (ref 11.5–15.4)
IMM GRANULOCYTES # BLD AUTO: 0.09 THOUSAND/UL (ref 0–0.2)
IMM GRANULOCYTES NFR BLD AUTO: 1 % (ref 0–2)
LYMPHOCYTES # BLD AUTO: 0.89 THOUSANDS/ÂΜL (ref 0.6–4.47)
LYMPHOCYTES NFR BLD AUTO: 6 % (ref 14–44)
MCH RBC QN AUTO: 30.3 PG (ref 26.8–34.3)
MCHC RBC AUTO-ENTMCNC: 34 G/DL (ref 31.4–37.4)
MCV RBC AUTO: 89 FL (ref 82–98)
MONOCYTES # BLD AUTO: 0.97 THOUSAND/ÂΜL (ref 0.17–1.22)
MONOCYTES NFR BLD AUTO: 6 % (ref 4–12)
NEUTROPHILS # BLD AUTO: 13.8 THOUSANDS/ÂΜL (ref 1.85–7.62)
NEUTS SEG NFR BLD AUTO: 87 % (ref 43–75)
NRBC BLD AUTO-RTO: 0 /100 WBCS
PLATELET # BLD AUTO: 284 THOUSANDS/UL (ref 149–390)
PMV BLD AUTO: 10.6 FL (ref 8.9–12.7)
POTASSIUM SERPL-SCNC: 4.9 MMOL/L (ref 3.5–5.3)
RBC # BLD AUTO: 3.6 MILLION/UL (ref 3.81–5.12)
SODIUM SERPL-SCNC: 136 MMOL/L (ref 135–147)
WBC # BLD AUTO: 15.76 THOUSAND/UL (ref 4.31–10.16)

## 2024-02-07 PROCEDURE — 97167 OT EVAL HIGH COMPLEX 60 MIN: CPT

## 2024-02-07 PROCEDURE — 97535 SELF CARE MNGMENT TRAINING: CPT

## 2024-02-07 PROCEDURE — 80048 BASIC METABOLIC PNL TOTAL CA: CPT | Performed by: PHYSICIAN ASSISTANT

## 2024-02-07 PROCEDURE — 99024 POSTOP FOLLOW-UP VISIT: CPT | Performed by: PHYSICIAN ASSISTANT

## 2024-02-07 PROCEDURE — 97110 THERAPEUTIC EXERCISES: CPT

## 2024-02-07 PROCEDURE — 85025 COMPLETE CBC W/AUTO DIFF WBC: CPT | Performed by: PHYSICIAN ASSISTANT

## 2024-02-07 PROCEDURE — 97530 THERAPEUTIC ACTIVITIES: CPT

## 2024-02-07 PROCEDURE — 97116 GAIT TRAINING THERAPY: CPT

## 2024-02-07 RX ORDER — ASPIRIN 325 MG
325 TABLET ORAL 2 TIMES DAILY
Qty: 82 TABLET | Refills: 0 | Status: SHIPPED | OUTPATIENT
Start: 2024-02-07

## 2024-02-07 RX ORDER — DEXAMETHASONE SODIUM PHOSPHATE 4 MG/ML
4 INJECTION, SOLUTION INTRA-ARTICULAR; INTRALESIONAL; INTRAMUSCULAR; INTRAVENOUS; SOFT TISSUE ONCE
Status: DISCONTINUED | OUTPATIENT
Start: 2024-02-07 | End: 2024-02-07

## 2024-02-07 RX ORDER — NALOXONE HYDROCHLORIDE 4 MG/.1ML
SPRAY NASAL
Qty: 1 EACH | Refills: 0 | Status: SHIPPED | OUTPATIENT
Start: 2024-02-07 | End: 2025-02-06

## 2024-02-07 RX ORDER — ACETAMINOPHEN 325 MG/1
975 TABLET ORAL EVERY 8 HOURS
Start: 2024-02-07

## 2024-02-07 RX ORDER — DOCUSATE SODIUM 100 MG/1
100 CAPSULE, LIQUID FILLED ORAL 2 TIMES DAILY
Qty: 60 CAPSULE | Refills: 0 | Status: SHIPPED | OUTPATIENT
Start: 2024-02-07

## 2024-02-07 RX ORDER — OXYCODONE HYDROCHLORIDE 5 MG/1
TABLET ORAL
Qty: 40 TABLET | Refills: 0 | Status: SHIPPED | OUTPATIENT
Start: 2024-02-07 | End: 2024-02-16 | Stop reason: SDUPTHER

## 2024-02-07 RX ADMIN — PANTOPRAZOLE SODIUM 40 MG: 40 TABLET, DELAYED RELEASE ORAL at 08:45

## 2024-02-07 RX ADMIN — ASPIRIN 325 MG: 325 TABLET ORAL at 08:44

## 2024-02-07 RX ADMIN — DOCUSATE SODIUM 100 MG: 100 CAPSULE, LIQUID FILLED ORAL at 08:44

## 2024-02-07 RX ADMIN — MIDODRINE HYDROCHLORIDE 5 MG: 5 TABLET ORAL at 08:44

## 2024-02-07 RX ADMIN — ACETAMINOPHEN 975 MG: 325 TABLET ORAL at 01:52

## 2024-02-07 RX ADMIN — DULOXETINE HYDROCHLORIDE 60 MG: 60 CAPSULE, DELAYED RELEASE ORAL at 08:45

## 2024-02-07 RX ADMIN — CEFAZOLIN SODIUM 2000 MG: 2 SOLUTION INTRAVENOUS at 04:40

## 2024-02-07 RX ADMIN — LEVOTHYROXINE SODIUM 50 MCG: 50 TABLET ORAL at 05:53

## 2024-02-07 RX ADMIN — ACETAMINOPHEN 975 MG: 325 TABLET ORAL at 08:45

## 2024-02-07 RX ADMIN — OXYCODONE HYDROCHLORIDE 10 MG: 10 TABLET ORAL at 03:04

## 2024-02-07 RX ADMIN — SODIUM CHLORIDE 75 ML/HR: 0.9 INJECTION, SOLUTION INTRAVENOUS at 03:01

## 2024-02-07 RX ADMIN — GABAPENTIN 300 MG: 300 CAPSULE ORAL at 08:44

## 2024-02-07 RX ADMIN — OXYCODONE HYDROCHLORIDE 5 MG: 5 TABLET ORAL at 08:44

## 2024-02-07 NOTE — PLAN OF CARE
Problem: PAIN - ADULT  Goal: Verbalizes/displays adequate comfort level or baseline comfort level  Description: Interventions:  - Encourage patient to monitor pain and request assistance  - Assess pain using appropriate pain scale  - Administer analgesics based on type and severity of pain and evaluate response  - Implement non-pharmacological measures as appropriate and evaluate response  - Consider cultural and social influences on pain and pain management  - Notify physician/advanced practitioner if interventions unsuccessful or patient reports new pain  2/7/2024 0733 by Eli Catherine RN  Outcome: Progressing  2/6/2024 1737 by Eli Catherine RN  Outcome: Progressing     Problem: INFECTION - ADULT  Goal: Absence or prevention of progression during hospitalization  Description: INTERVENTIONS:  - Assess and monitor for signs and symptoms of infection  - Monitor lab/diagnostic results  - Monitor all insertion sites, i.e. indwelling lines, tubes, and drains  - Monitor endotracheal if appropriate and nasal secretions for changes in amount and color  - [place] appropriate cooling/warming therapies per order  - Administer medications as ordered  - Instruct and encourage patient and family to use good hand hygiene technique  - Identify and instruct in appropriate isolation precautions for identified infection/condition  2/7/2024 0733 by Eli Catherine RN  Outcome: Progressing  2/6/2024 1737 by Eli Catherine RN  Outcome: Progressing     Problem: SAFETY ADULT  Goal: Patient will remain free of falls  Description: INTERVENTIONS:  - Educate patient/family on patient safety including physical limitations  - Instruct patient to call for assistance with activity   - Consult OT/PT to assist with strengthening/mobility   - Keep Call bell within reach  - Keep bed low and locked with side rails adjusted as appropriate  - Keep care items and personal belongings within reach  - Initiate and maintain  comfort rounds  - Make Fall Risk Sign visible to staff  - Offer Toileting every  Hours, in advance of need  - Initiate/Maintain alarm  - Obtain necessary fall risk management equipment:   - Apply yellow socks and bracelet for high fall risk patients  - Consider moving patient to room near nurses station  2/7/2024 0733 by Eli Catherine RN  Outcome: Progressing  2/6/2024 1737 by Eli Catherine RN  Outcome: Progressing     Problem: DISCHARGE PLANNING  Goal: Discharge to home or other facility with appropriate resources  Description: INTERVENTIONS:  - Identify barriers to discharge w/patient and caregiver  - Arrange for needed discharge resources and transportation as appropriate  - Identify discharge learning needs (meds, wound care, etc.)  - Arrange for interpretive services to assist at discharge as needed  - Refer to Case Management Department for coordinating discharge planning if the patient needs post-hospital services based on physician/advanced practitioner order or complex needs related to functional status, cognitive ability, or social support system  2/7/2024 0733 by Eli Catherine RN  Outcome: Progressing  2/6/2024 1737 by Eli Catherine RN  Outcome: Progressing     Problem: SKIN/TISSUE INTEGRITY - ADULT  Goal: Incision(s), wounds(s) or drain site(s) healing without S/S of infection  Description: INTERVENTIONS  - Assess and document dressing, incision, wound bed, drain sites and surrounding tissue  - Provide patient and family education  - Perform skin care/dressing changes every  2/7/2024 0733 by Eli Catherine RN  Outcome: Progressing  2/6/2024 1737 by Eli Catherine RN  Outcome: Progressing     Problem: MUSCULOSKELETAL - ADULT  Goal: Maintain or return mobility to safest level of function  Description: INTERVENTIONS:  - Assess patient's ability to carry out ADLs; assess patient's baseline for ADL function and identify physical deficits which impact ability to perform  ADLs (bathing, care of mouth/teeth, toileting, grooming, dressing, etc.)  - Assess/evaluate cause of self-care deficits   - Assess range of motion  - Assess patient's mobility  - Assess patient's need for assistive devices and provide as appropriate  - Encourage maximum independence but intervene and supervise when necessary  - Involve family in performance of ADLs  - Assess for home care needs following discharge   - Consider OT consult to assist with ADL evaluation and planning for discharge  - Provide patient education as appropriate  2/7/2024 0733 by Eli Catherine RN  Outcome: Progressing  2/6/2024 1737 by Eli Catherine RN  Outcome: Progressing  Goal: Maintain proper alignment of affected body part  Description: INTERVENTIONS:  - Support, maintain and protect limb and body alignment  - Provide patient/ family with appropriate education  2/7/2024 0733 by Eli Catherine RN  Outcome: Progressing  2/6/2024 1737 by Eli Catherine RN  Outcome: Progressing

## 2024-02-07 NOTE — PROGRESS NOTES
SWCM reviewed pt chart and pt had procedure completed on 02/06/2024. [facility] will continue to follow pt and follow up once pt is DC to home.

## 2024-02-07 NOTE — PLAN OF CARE
Problem: PAIN - ADULT  Goal: Verbalizes/displays adequate comfort level or baseline comfort level  Description: Interventions:  - Encourage patient to monitor pain and request assistance  - Assess pain using appropriate pain scale  - Administer analgesics based on type and severity of pain and evaluate response  - Implement non-pharmacological measures as appropriate and evaluate response  - Consider cultural and social influences on pain and pain management  - Notify physician/advanced practitioner if interventions unsuccessful or patient reports new pain  Outcome: Progressing     Problem: INFECTION - ADULT  Goal: Absence or prevention of progression during hospitalization  Description: INTERVENTIONS:  - Assess and monitor for signs and symptoms of infection  - Monitor lab/diagnostic results  - Monitor all insertion sites, i.e. indwelling lines, tubes, and drains  - Monitor endotracheal if appropriate and nasal secretions for changes in amount and color  - [place] appropriate cooling/warming therapies per order  - Administer medications as ordered  - Instruct and encourage patient and family to use good hand hygiene technique  - Identify and instruct in appropriate isolation precautions for identified infection/condition  Outcome: Progressing     Problem: MUSCULOSKELETAL - ADULT  Goal: Maintain or return mobility to safest level of function  Description: INTERVENTIONS:  - Assess patient's ability to carry out ADLs; assess patient's baseline for ADL function and identify physical deficits which impact ability to perform ADLs (bathing, care of mouth/teeth, toileting, grooming, dressing, etc.)  - Assess/evaluate cause of self-care deficits   - Assess range of motion  - Assess patient's mobility  - Assess patient's need for assistive devices and provide as appropriate  - Encourage maximum independence but intervene and supervise when necessary  - Involve family in performance of ADLs  - Assess for home care needs  following discharge   - Consider OT consult to assist with ADL evaluation and planning for discharge  - Provide patient education as appropriate  Outcome: Progressing

## 2024-02-07 NOTE — PHYSICAL THERAPY NOTE
"   PT TREATMENT     02/07/24 1150   PT Last Visit   PT Visit Date 02/07/24   Note Type   Note Type BID visit/treatment   Pain Assessment   Pain Assessment Tool Mens[place]-Baker FACES   M[place]-Baker FACES Pain Rating 4   Pain Location/Orientation Orientation: Right;Location: Knee   Restrictions/Precautions   RLE Weight Bearing Per Order WBAT   Braces or Orthoses Other (Comment)  (Dr. Kim OK'd using a knee immobilizer as needed for R knee weakness.)   General   Chart Reviewed Yes   Cognition   Overall Cognitive Status WFL   Subjective   Subjective \"I really want to go home today\"   Bed Mobility   Supine to Sit 4  Minimal assistance   Additional items LE management   Transfers   Sit to Stand 5  Supervision   Stand to Sit 5  Supervision   Stand pivot 5  Supervision   Additional items Verbal cues;Increased time required  (hand placement)   Ambulation/Elevation   Gait pattern   (tenative gait, no knee buckling, step to pattern)   Gait Assistance   (supervision/min assist)   Additional items Other (Comment)  (R knee 18\" knee immobilizer)   Assistive Device Rolling walker   Distance 2x50 feet   Stair Management Assistance   (supervision/min assist)   Additional items Tactile cues;Verbal cues  (Pt had one episode of R knee buckling even with immobilizer when descending the steps requiring PT assist.)   Stair Management Technique One rail R;With cane   Number of Stairs 8   Balance   Static Sitting Good   Dynamic Sitting Fair +   Static Standing Fair   Activity Tolerance   Activity Tolerance Patient tolerated treatment well;Patient limited by fatigue   Exercises   Neuro re-ed Attempted quad set, pt still unable.   Assessment   Prognosis Good   Problem List Decreased strength;Impaired balance;Decreased mobility;Pain;Impaired sensation   Assessment Pt seen for a second session this morning POD 1 s/p R TKA.  Dr. Kim OK'ed using a knee immobilizer prn due to R quad numbness/weakness.  Short knee immobilizer applied. Pt was able to " ambulate without episodes of knee buckling with the immobilizer and a walker.  Pt was able to negotiate the steps with the knee immobilizer but still had one episode of agustín knee buckling despite PT assist on her first attempt of descending the steps with no further episodes.  Plan to revisit pt one more time before DC home today to review HEP, ambulation with walker and stair climbing. Recommend pt wear the knee immobilizer when ambulating/stair climbing until quad function returns.    The patient's AM-PAC Basic Mobility Inpatient Short Form Raw Score is 17. A Raw score of greater than 16 suggests the patient may benefit from discharge to home. Please also refer to the recommendation of the Physical Therapist for safe discharge planning.         Plan   Treatment/Interventions ADL retraining;Functional transfer training;LE strengthening/ROM;Elevations;Therapeutic exercise;Gait training;Bed mobility;Equipment eval/education;Patient/family training   Progress Progressing toward goals   PT Frequency Twice a day   Discharge Recommendation   Rehab Resource Intensity Level, PT III (Minimum Resource Intensity)   Equipment Recommended Other (Comment)  (Pt issued a knee immobilizer to use prn)   AM-PAC Basic Mobility Inpatient   Turning in Flat Bed Without Bedrails 4   Lying on Back to Sitting on Edge of Flat Bed Without Bedrails 3   Moving Bed to Chair 3   Standing Up From Chair Using Arms 3   Walk in Room 3   Climb 3-5 Stairs With Railing 3   Basic Mobility Inpatient Raw Score 19   Basic Mobility Standardized Score 42.48   Highest Level Of Mobility   JH-HLM Goal 6: Walk 10 steps or more   JH-HLM Achieved 7: Walk 25 feet or more   End of Consult   Patient Position at End of Consult All needs within reach;Bedside chair   Licensure   [place] License Number  Raiza Joseph PT  [id number]

## 2024-02-07 NOTE — PLAN OF CARE
Problem: OCCUPATIONAL THERAPY ADULT  Goal: Performs self-care activities at highest level of function for planned discharge setting.  See evaluation for individualized goals.  Description: Treatment Interventions: ADL retraining, Functional transfer training, Patient/family training, Equipment evaluation/education, Activityengagement          See flowsheet documentation for full assessment, interventions and recommendations.   Note: Limitation: Decreased ADL status, Decreased self-care trans, Decreased high-level ADLs (increased pain, R LE  ROM/strength deficits, decreased standing tolerance)     Assessment: Pt is a 68 y.o female s/p R TKR on 2/6/24 w/ Dr. Kim. Pt was pleasant and willing to participate upon therapist arrival. Pt reports living w/ her  in a one-story house w/ 2 RODOL[address]. Pt reports I w/ ADLs/IADLs prior to admission. Upon eval, pt completed grooming task while seated w/ mod I to wash face. Pt participated in UB bathing while seated w/ min A to clean back. Pt required min A to complete UB dressing to don gown around back and fasten. Pt required mod A to complete LB dressing to don underwear and pants. Pt completed functional mobility household distances w/ min A using RW. Pt currently completing ADLs below baseline level of I due to anticipated R LE ROM/strength deficits. From OT standpoint, recommend no post-acute rehab needs once discharged. OT will follow 3-5x/week to maximize I w/ ADLs.     Rehab Resource Intensity Level, OT: No post-acute rehabilitation needs

## 2024-02-07 NOTE — PHYSICAL THERAPY NOTE
"   PT TREATMENT     02/07/24 0913   PT Last Visit   PT Visit Date 02/07/24   Note Type   Note Type BID visit/treatment   Pain Assessment   Pain Assessment Tool 0-10   Pain Score 5   Pain Location/Orientation Orientation: Right;Location: Knee  (RN gave pain meds per pt prior to PT)   Restrictions/Precautions   RLE Weight Bearing Per Order WBAT   Other Precautions Pain;Fall Risk  (R quad weakness)   General   Chart Reviewed Yes   Cognition   Overall Cognitive Status WFL   Subjective   Subjective \"I'm having trouble with the quad set\"   Bed Mobility   Supine to Sit 4  Minimal assistance   Additional items LE management   Transfers   Sit to Stand 4  Minimal assistance   Stand to Sit 4  Minimal assistance   Stand pivot 4  Minimal assistance   Additional items Verbal cues  (with walker, moderate verbal cues/tactile cues  to keep R knee extended in stance)   Ambulation/Elevation   Gait pattern Inconsistent mitchel;Step to  (Occasional R knee buckling especially with fatigue.)   Gait Assistance 4  Minimal assist   Assistive Device Rolling walker   Distance 25 feet   Balance   Static Sitting Good   Static Standing Fair   Ambulatory Fair -   Activity Tolerance   Activity Tolerance Patient tolerated treatment well;Patient limited by fatigue;Patient limited by pain   Exercises   TKR   (ROM R knee 0-90)   R LE exercise x 10 each ankle pumps, pt unable to perform quad sets even with tactile cues due to numbness/weakness, AAROM heel slides, AAROM LAQ.  Ice to knee after PT.   Assessment   Prognosis Good   Problem List Decreased strength;Decreased range of motion;Decreased endurance;Decreased mobility;Pain   Assessment Pt is 1 day s/p R TKA.  Pt c/o R knee numbness and inability to perform a quad set.  MMT R knee extension is 0-1/5.  Pt is able to ambulate a short distance with a walker with occasional knee buckling and at least min assist. Plan to re-assess pt in a few hours and trial a knee immobilizer to improve pt's ability to " safely ambulate on level surfaces and stairs so pt can be safely DC'd home if cleared medically.    The patient's AM-PAC Basic Mobility Inpatient Short Form Raw Score is 17. A Raw score of greater than 16 suggests the patient may benefit from discharge to home. Please also refer to the recommendation of the Physical Therapist for safe discharge planning.         Plan   Treatment/Interventions Therapeutic exercise;Elevations;LE strengthening/ROM;Functional transfer training;ADL retraining;Gait training;Bed mobility;Equipment eval/education;Spoke to nursing   Progress Progressing toward goals   PT Frequency Twice a day   Discharge Recommendation   Rehab Resource Intensity Level, PT III (Minimum Resource Intensity)   Equipment Recommended Other (Comment)  (Pt has a cane and a walker, pt may need a knee immobilizer to safely go home today.)   AM-PAC Basic Mobility Inpatient   Turning in Flat Bed Without Bedrails 4   Lying on Back to Sitting on Edge of Flat Bed Without Bedrails 3   Moving Bed to Chair 3   Standing Up From Chair Using Arms 3   Walk in Room 3   Climb 3-5 Stairs With Railing 1   Basic Mobility Inpatient Raw Score 17   Basic Mobility Standardized Score 39.67   Highest Level Of Mobility   JH-HLM Goal 5: Stand one or more mins   JH-HLM Achieved 7: Walk 25 feet or more   Education   Education Provided Mobility training;Home exercise program;Assistive device   Patient Demonstrates verbal understanding;Reinforcement needed   Licensure   [place] License Number  Raiza Joseph PT  [id number]

## 2024-02-07 NOTE — PHYSICAL THERAPY NOTE
"   PT TREATMENT     02/07/24 1401   PT Last Visit   PT Visit Date 02/07/24   Note Type   Note Type BID visit/treatment   Pain Assessment   Pain Assessment Tool 0-10   Pain Score No Pain   Restrictions/Precautions   RLE Weight Bearing Per Order WBAT   Other Precautions   (R knee is weak/fabiola)   General   Chart Reviewed Yes   Cognition   Overall Cognitive Status WFL   Subjective   Subjective \"I am ready to go home\"   Transfers   Sit to Stand 5  Supervision   Stand to Sit 5  Supervision   Stand pivot 5  Supervision   Additional items   (with walker)   Ambulation/Elevation   Gait pattern   (step to)   Gait Assistance 5  Supervision   Assistive Device Rolling walker  (L knee immobilizer)   Distance 2x60 feet   Stair Management Assistance 5  Supervision   Stair Management Technique One rail L;With cane  (knee immobilizer R knee)   Balance   Static Sitting Fair +   Static Standing Fair +   Ambulatory Fair   Activity Tolerance   Activity Tolerance Patient tolerated treatment well   Exercises   Neuro re-ed verbally reviewed HEP, pt still unable to perform a quad set on the R   Assessment   Prognosis Good   Problem List Decreased strength;Decreased range of motion;Impaired balance;Decreased mobility;Impaired sensation   Assessment Pt demonstrates improving functon POD 1 s/p R TKA.  Pt's R quad function however still remains very weak so pt advised to wear the knee immobilizer provided for her until her knee stops buckling/quad strength returns.  Pt was able to ambulate with a walker 2x 60 feet and negotiate the steps with supervision assist while wearing the knee immobilizer without any agustín knee buckling.  Pt is ready to go home from the PT point of view to start OP PT later this week.    The patient's AM-PAC Basic Mobility Inpatient Short Form Raw Score is 19. A Raw score of greater than 16 suggests the patient may benefit from discharge to home. Please also refer to the recommendation of the Physical Therapist for safe " discharge planning.         Plan   Treatment/Interventions Functional transfer training;LE strengthening/ROM;Therapeutic exercise;Equipment eval/education;Bed mobility;Gait training;Patient/family training;ADL retraining   Progress Progressing toward goals   PT Frequency Twice a day   Discharge Recommendation   Rehab Resource Intensity Level, PT III (Minimum Resource Intensity)   Equipment Recommended   (Pt has a cane and a walker)   AM-PAC Basic Mobility Inpatient   Turning in Flat Bed Without Bedrails 4   Lying on Back to Sitting on Edge of Flat Bed Without Bedrails 3   Moving Bed to Chair 3   Standing Up From Chair Using Arms 3   Walk in Room 3   Climb 3-5 Stairs With Railing 3   Basic Mobility Inpatient Raw Score 19   Basic Mobility Standardized Score 42.48   Highest Level Of Mobility   JH-HLM Goal 6: Walk 10 steps or more   JH-HLM Achieved 7: Walk 25 feet or more   Education   Education Provided Other;Mobility training  (Pt instructed to wear the knee immobilizer as needed to walk and stair climb until her quad function returns)   Patient Demonstrates verbal understanding   End of Consult   Patient Position at End of Consult All needs within reach;Bed/Chair alarm activated   Licensure   [place] License Number  Raiza Joseph PT [id number]

## 2024-02-07 NOTE — PROGRESS NOTES
"Progress Note - Orthopedics   Ina Tolbert 68 y.o. female MRN: [id number]  Unit/Bed#: [address] [place] [address] Encounter: [id number]    Assessment:  1)POD#1 s/p Robotic Assisted Right TKA    Plan:  Ancef 2g IV x 2 for 24 hours postop - completed  DVT prophylaxis: ASA 325mg PO BID/SCD's/Ambulation  PT/OT- WBAT RLE  Analgesia PRN  Follow up AM labs - stable, DC IVF, slight leukocytosis so will discontinue dose of decadron  Echavarria DC - monitor for void  Dressing- monitor for drainage, Mepilex may remain in place 7 days  Discharge planning -patient has met all discharge criteria with nursing and therapy.  Due to her quad dysfunction, she will be given a knee immobilizer to wear while active for the first few days until her quad function returns.  All discharge instructions and patient questions were discussed in detail at bedside prior to discharge.  She will return to see Dr. Kim in 2 weeks for continued postoperative care and will begin outpatient physical therapy later this week.    Weight bearing: WBAT RLE    VTE Pharmacologic Prophylaxis: ASA 325mg PO BID  VTE Mechanical Prophylaxis: sequential compression device    Subjective: Patient seen and examined this afternoon.  Pain controlled. No overnight events.  Patient did have quadriceps dysfunction working with therapy and was given a knee immobilizer.  She otherwise did well with physical and Occupational Therapy.  Denies current complaints    Vitals: Blood pressure 132/76, pulse 89, temperature 97.6 °F (36.4 °C), resp. rate 17, height 5' 5\" (1.651 m), weight 75.9 kg (167 lb 5.3 oz), SpO2 97%.,Body mass index is 27.85 kg/m².      Intake/Output Summary (Last 24 hours) at 2/7/2024 1307  Last data filed at 2/7/2024 0600  Gross per 24 hour   Intake --   Output 1075 ml   Net -1075 ml       Invasive Devices       Peripheral Intravenous Line  Duration             Peripheral IV 02/06/24 Left Forearm 1 day                    Physical Exam: NAD, A&Ox3  Ortho Exam: RLE: right anterior " knee dsg c/d/i, compartments soft, calf non-tender, +PF/DF/EHL, +DP/SP/Saph/Sural SILT, DP 2+, foot warm, TEDS in place    Lab, Imaging and other studies: PO XR R knee: Reviewed and acceptable    Lab Results   Component Value Date    WBC 15.76 (H) 02/07/2024    HGB 10.9 (L) 02/07/2024    HCT 32.1 (L) 02/07/2024    MCV 89 02/07/2024     02/07/2024     Lab Results   Component Value Date     08/04/2016    SODIUM 136 02/07/2024    K 4.9 02/07/2024     02/07/2024    CO2 19 (L) 02/07/2024    AGAP 9 02/07/2024    BUN 19 02/07/2024    CREATININE 0.63 02/07/2024    GLUC 152 (H) 02/07/2024    GLUF 152 (H) 02/07/2024    CALCIUM 8.9 02/07/2024    AST 34 01/18/2024    ALT 35 01/18/2024    ALKPHOS 61 01/18/2024    PROT 6.8 08/04/2016    TP 6.4 01/18/2024    BILITOT 0.2 08/04/2016    TBILI 0.39 01/18/2024    EGFR 93 02/07/2024     Blaze Botello PA-C

## 2024-02-07 NOTE — OCCUPATIONAL THERAPY NOTE
Occupational Therapy Evaluation     Patient Name: Ina Tolbert  Today's Date: 2/7/2024  Problem List  Principal Problem:    S/P total knee replacement not using cement, right    Past Medical History  Past Medical History:   Diagnosis Date    Abdominal adhesions     Alcoholism (HCC) 1996    in remission    Anesthesia complication     difficult to wake up    Anxiety     Arthritis     Asthma     with exertion    Cancer (HCC)     melanoma    Colon polyp     Crohn's disease (HCC)     Depression     Depression     Disease of thyroid gland     Fibromyalgia     Fibromyalgia, primary     Fractures 2006    GERD (gastroesophageal reflux disease)     History of cholecystectomy 09/07/2019    Hyperlipidemia     Infected tooth 01/2024    Tooth #14-was treated with antibiotics-endodontal appt 2/1 may need root canal    Irregular heart beat     can be tachy; also has orthoststic hypotension    Irritable bowel syndrome 1990    Lazy eye     resolved: 3/27/17    Medical clearance for psychiatric admission 07/13/2021    Melanoma (HCC) 2010    Mild asthma 11/04/2020    Osteoarthritis 07/2018    Osteopenia     with joint pain-elevated DEV    Polymyalgia (HCC)     Psychiatric disorder     Shortness of breath     assoc with tachycardia    Stomach disorder 1995    Tinnitus     Wears glasses     for reading     Past Surgical History  Past Surgical History:   Procedure Laterality Date    ABDOMINAL SURGERY      lysis of adhesions x 2    APPENDECTOMY      CERVICAL FUSION      May 5,2021 and Jan. 01,2020    CHOLECYSTECTOMY      open    COLONOSCOPY      DILATION AND CURETTAGE OF UTERUS      FOOT SURGERY  05/2017    NECK SURGERY  01/2019 5/2021    NEUROMA EXCISION Right 05/26/2017    Procedure: EXCISION MASS / FIBROMA FOOT;  Surgeon: Jorden Crespo DPM;  Location: [facility] MAIN OR;  Service:     PARS PLANA VITRECTOMY W/ REPAIR OF MACULAR HOLE  12/23/2020    DC XCAPSL CTRC RMVL INSJ IO LENS PROSTH W/O ECP Left 12/06/2021    Procedure: EXTRACTION  EXTRACAPSULAR CATARACT PHACO INTRAOCULAR LENS (IOL);  Surgeon: Mandeep Chavez MD;  Location[facility] MAIN OR;  Service: Ophthalmology    RIGHT OOPHORECTOMY      UPPER GASTROINTESTINAL ENDOSCOPY  2019    WISDOM TOOTH EXTRACTION      x4         24 1119   OT Last Visit   OT Visit Date 24   Note Type   Note type Evaluation  (eval [id number] + tx session 1558-1386)   Additional Comments Pt identified by full name and    Pain Assessment   Pain Assessment Tool 0-10   Pain Score 7   Pain Location/Orientation Orientation: Right;Location: Knee   Effect of Pain on Daily Activities limits mobility   Patient's Stated Pain Goal No pain   Hospital Pain Intervention(s) Cold applied;Ambulation/increased activity;Elevated;Medication (See MAR)  (RN gave pain meds prior to therapist arrival)   Multiple Pain Sites No   Restrictions/Precautions   Weight Bearing Precautions Per Order Yes   RLE Weight Bearing Per Order WBAT   Other Precautions WBS;Fall Risk;Pain;Multiple lines;Chair Alarm;Bed Alarm  (praful on room air)   Home Living   Type of Home House   Home Layout One level;Performs ADLs on one level;Able to live on main level with bedroom/bathroom;Laundry in basement;Stairs to enter with rails  (one-story home w/ 2 RODOLFO, laundry in basement (pt reports  will do laundry))   Bathroom Shower/Tub Tub/shower unit   Bathroom Toilet Standard   Bathroom Equipment Grab bars in shower;Grab bars around toilet   Bathroom Accessibility Accessible   Home Equipment Walker;Cane;Grab bars   Prior Function   Level of [place] Independent with ADLs;Independent with functional mobility;Independent with IADLS   Lives With Spouse   Receives Help From Family   IADLs Independent with driving;Independent with meal prep;Independent with medication management   Falls in the last 6 months 1 to 4  (Pt reports having 1 fall in last 6 months)   Vocational Full time employment   Lifestyle   Autonomy Pt reports I w/ ADLs/IADLs prior to  "admission   Reciprocal Relationships Pt reports living w/    Service 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 Patient personal factors supporting engagement include independence with ADLS prior, first floor home setup, independence with IADLS prior, lives with family/friends, and access to DME/AD. Barriers incluse increased pain, stairs to enter home, limited family support, R LE ROM/strength deficits.   Family/Caregiver Present No   Additional General Comments Pt is a 68 y.o female s/p R TKR w/ Dr. Kim on 2/6/24   Subjective   Subjective \"The pain is really bad\"   ADL   Where Assessed Chair  (vs bathroom)   Eating Assistance 7  Independent   Eating Deficit None   Grooming Assistance 6  Modified Independent   Grooming Deficit Wash/dry face;Increased time to complete;Supervision/safety;Setup;Verbal cueing  (Pt completed face washing while seated w/ mod I)   UB Bathing Assistance 4  Minimal Assistance   UB Bathing Deficit Supervision/safety;Increased time to complete  (Pt required min A to complete UB bathing to clean back)   LB Bathing Assistance 4  Minimal Assistance   LB Bathing Deficit Other (Comment)  (Simulated during LB dressing)   UB Dressing Assistance 4  Minimal Assistance   UB Dressing Deficit Pull down in back;Fasteners  (Pt required min A to don gown back on after bathing to pull around back and fasten)   LB Dressing Assistance 3  Moderate Assistance   LB Dressing Deficit Setup;Requires assistive device for steadying;Verbal cueing;Supervision/safety;Increased " time to complete;Thread RLE into pants;Thread RLE into underwear;Pull up over hips  (Pt required mod A to complete LB dressing to don underwear and pants)   Bed Mobility   Additional Comments Pt seated OOB upon therapist arrival and seated on shower chair at bathroom sink post eval   Transfers   Sit to Stand 4  Minimal assistance   Additional items Assist x 1;Armrests;Increased time required;Verbal cues   Stand to Sit 4  Minimal assistance   Additional items Assist x 1;Armrests;Increased time required;Verbal cues   Additional items Other  (see tx assessment)   Additional Comments Pt completed sit <> stand x2, requiring cues to keep knee straight to avoid buckling   Functional Mobility   Functional Mobility 4  Minimal assistance   Additional Comments Pt completed functional mobility household distances using RW w/ min A, requiring cues to keep R knee straight to avoid buckling   Additional items Rolling walker   Balance   Static Sitting Good   Static Standing Fair   Ambulatory Fair -   Activity Tolerance   Activity Tolerance Patient limited by pain   Medical Staff Made Aware spoke w/ PT Emily   Nurse Made Aware spoke w/ RN   RUE Assessment   RUE Assessment WFL  (observed during ADLs)   LUE Assessment   LUE Assessment WFL  (observed during ADLs)   Cognition   Overall Cognitive Status WFL   Arousal/Participation Alert;Responsive;Cooperative   Attention Within functional limits   Orientation Level Oriented X4   Memory Within functional limits   Following Commands Follows all commands and directions without difficulty   Comments Pt alert, oriented x4, able to hold conversation   Assessment   Limitation Decreased ADL status;Decreased self-care trans;Decreased high-level ADLs  (increased pain, R LE  ROM/strength deficits, decreased standing tolerance)   Assessment Pt is a 68 y.o female s/p R TKR on 2/6/24 w/ Dr. Kim. Pt was pleasant and willing to participate upon therapist arrival. Pt reports living w/ her  in a  one-story house w/ 2 RODOLFO. Pt reports I w/ ADLs/IADLs prior to admission. Upon eval, pt completed grooming task while seated w/ mod I to wash face. Pt participated in UB bathing while seated w/ min A to clean back. Pt required min A to complete UB dressing to don gown around back and fasten. Pt required mod A to complete LB dressing to don underwear and pants. Pt completed functional mobility household distances w/ min A using RW. Pt currently completing ADLs below baseline level of I due to anticipated R LE ROM/strength deficits. From OT standpoint, recommend no post-acute rehab needs once discharged. OT will follow 3-5x/week to maximize I w/ ADLs.   Goals   Patient Goals To go home   Plan   Treatment Interventions ADL retraining;Functional transfer training;Patient/family training;Equipment evaluation/education;Activityengagement   Goal Expiration Date 02/21/24   OT Treatment Day 0   OT Frequency 3-5x/wk   Discharge Recommendation   Rehab Resource Intensity Level, OT No post-acute rehabilitation needs   AM-PAC Daily Activity Inpatient   Lower Body Dressing 3   Bathing 3   Toileting 4   Upper Body Dressing 4   Grooming 4   Eating 4   Daily Activity Raw Score 22   Daily Activity Standardized Score (Calc for Raw Score >=11) 47.1   AM-PAC Applied Cognition Inpatient   Following a Speech/Presentation 4   Understanding Ordinary Conversation 4   Taking Medications 4   Remembering Where Things Are Placed or Put Away 4   Remembering List of 4-5 Errands 4   Taking Care of Complicated Tasks 4   Applied Cognition Raw Score 24   Applied Cognition Standardized Score 62.21   Barthel Index   Feeding 10   Bathing 0   Grooming Score 5   Dressing Score 5   Bladder Score 10   Bowels Score 10   Toilet Use Score 5   Transfers (Bed/Chair) Score 10   Mobility (Level Surface) Score 0   Stairs Score 0   Barthel Index Score 55   Additional Treatment Session   Start Time 1055   End Time 1119   Treatment Assessment Pt seen for skilled OT tx  session day # 1 on 2/7/24 to address ADLs, functional mobility, and activity engagement. Pt participated in grooming task while seated at sink to brush teeth w/ mod I. Pt completed functional mobility w/ min A using RW from sink to toilet. Pt completed toileting w/ min A for steadying and clothing management and required min A for sit <> stand using RW. Pt completed functional mobility w/ min A from bathroom to bed using RW. Pt required min A for bed mobility sit <> supine for LE management. Pt educated on proper technique for car transfers once discharged and demonstrated verbal understanding. Pt supine in bed at end of tx session, all needs met, call bell in reach. OT continue to recommend no post-acute rehab needs once discharged. Will continue to follow pt 3-5x/week.   Additional Treatment Day 1   End of Consult   Education Provided Yes  (Pt educated on role of OT, proper hand placement, car transfers)   Patient Position at End of Consult Supine;Bed/Chair alarm activated;All needs within reach   Nurse Communication Nurse aware of consult   End of Consult Comments Pt supine in bed at end of session, call bell in reach, all needs met   Licensure   [place] License Number  Lacey Emily, OTS     Pt will meet the following goals within 14 days in order to return home and go back to work:    LTG  - Pt will complete UB bathing w/ mod I for + time within 14 days  - Pt will complete LB bathing w/ mod I for + time within 14 days  - Pt will complete LB dressing w/ S within 14 days  - Pt will complete functional transfers to/from all surfaces using LRAD as needed w/ mod I for + time within 14 days  - Pt will complete functional mobility using LRAD as needed household distances w/ mod I for + time within 14 days  - Pt will demonstrate improved standing tolerance by engaging in ADL task in stance for at least 10-15 min w/o signs of pain within 14 days  - Pt will demonstrate improved activity tolerance by engaging in ADL task for at  least 10-15 min w/o signs of pain/fatigue within 14 days  - Pt will complete toileting w/ mod I for + time within 14 days      Pt will meet the following goals within 3-5 days in order to return home and go back to work:    STG  - Pt will complete UB bathing w/ S within 3-5 days  - Pt will complete LB bathing w/ S within 3-5 days  - Pt will complete LB dressing w/ min A within 3-5 days  - Pt will complete functional transfers to/from all surfaces using LRAD as needed w/ S within 3-5 days  - Pt will complete functional mobility using LRAD as needed household distances w/ S within 3-5 days   - Pt will demonstrate improved standing tolerance by engaging in ADL task in stance for at least 5 min w/o signs of pain within 3-5 days  - Pt will demonstrate improved activity tolerance by engaging in ADL task for at least 5 min w/o signs of pain/fatigue within 3-5 days  - Pt will complete toileting w/ S within 3-5 days  - Pt will complete UB dressing independently within 3-5 days      The patient's raw score on the -PAC Daily Activity Inpatient Short Form is 22. A raw score of greater than or equal to 19 suggests the patient may benefit from discharge to home. Please refer to the recommendation of the Occupational Therapist for safe discharge planning.     HARPREET Bach

## 2024-02-07 NOTE — PLAN OF CARE
Problem: PAIN - ADULT  Goal: Verbalizes/displays adequate comfort level or baseline comfort level  Description: Interventions:  - Encourage patient to monitor pain and request assistance  - Assess pain using appropriate pain scale  - Administer analgesics based on type and severity of pain and evaluate response  - Implement non-pharmacological measures as appropriate and evaluate response  - Consider cultural and social influences on pain and pain management  - Notify physician/advanced practitioner if interventions unsuccessful or patient reports new pain  2/7/2024 1329 by Eli Catherine RN  Outcome: Completed  2/7/2024 0733 by Eli Catherine RN  Outcome: Progressing     Problem: INFECTION - ADULT  Goal: Absence or prevention of progression during hospitalization  Description: INTERVENTIONS:  - Assess and monitor for signs and symptoms of infection  - Monitor lab/diagnostic results  - Monitor all insertion sites, i.e. indwelling lines, tubes, and drains  - Monitor endotracheal if appropriate and nasal secretions for changes in amount and color  - [place] appropriate cooling/warming therapies per order  - Administer medications as ordered  - Instruct and encourage patient and family to use good hand hygiene technique  - Identify and instruct in appropriate isolation precautions for identified infection/condition  2/7/2024 1329 by Eli Catherine RN  Outcome: Completed  2/7/2024 0733 by Eli Catherine RN  Outcome: Progressing     Problem: SAFETY ADULT  Goal: Patient will remain free of falls  Description: INTERVENTIONS:  - Educate patient/family on patient safety including physical limitations  - Instruct patient to call for assistance with activity   - Consult OT/PT to assist with strengthening/mobility   - Keep Call bell within reach  - Keep bed low and locked with side rails adjusted as appropriate  - Keep care items and personal belongings within reach  - Initiate and maintain comfort  rounds  - Make Fall Risk Sign visible to staff  - Offer Toileting every  Hours, in advance of need  - Initiate/Maintain alarm  - Obtain necessary fall risk management equipment:   - Apply yellow socks and bracelet for high fall risk patients  - Consider moving patient to room near nurses station  2/7/2024 1329 by Eil Catherine RN  Outcome: Completed  2/7/2024 0733 by Eli Catherine RN  Outcome: Progressing     Problem: DISCHARGE PLANNING  Goal: Discharge to home or other facility with appropriate resources  Description: INTERVENTIONS:  - Identify barriers to discharge w/patient and caregiver  - Arrange for needed discharge resources and transportation as appropriate  - Identify discharge learning needs (meds, wound care, etc.)  - Arrange for interpretive services to assist at discharge as needed  - Refer to Case Management Department for coordinating discharge planning if the patient needs post-hospital services based on physician/advanced practitioner order or complex needs related to functional status, cognitive ability, or social support system  2/7/2024 1329 by Eli Ctaherine RN  Outcome: Completed  2/7/2024 0733 by Eli Catherine RN  Outcome: Progressing     Problem: SKIN/TISSUE INTEGRITY - ADULT  Goal: Incision(s), wounds(s) or drain site(s) healing without S/S of infection  Description: INTERVENTIONS  - Assess and document dressing, incision, wound bed, drain sites and surrounding tissue  - Provide patient and family education  - Perform skin care/dressing changes every  2/7/2024 1329 by Eli Catherine RN  Outcome: Completed  2/7/2024 0733 by Eli Catherine RN  Outcome: Progressing     Problem: MUSCULOSKELETAL - ADULT  Goal: Maintain or return mobility to safest level of function  Description: INTERVENTIONS:  - Assess patient's ability to carry out ADLs; assess patient's baseline for ADL function and identify physical deficits which impact ability to perform ADLs (bathing,  care of mouth/teeth, toileting, grooming, dressing, etc.)  - Assess/evaluate cause of self-care deficits   - Assess range of motion  - Assess patient's mobility  - Assess patient's need for assistive devices and provide as appropriate  - Encourage maximum independence but intervene and supervise when necessary  - Involve family in performance of ADLs  - Assess for home care needs following discharge   - Consider OT consult to assist with ADL evaluation and planning for discharge  - Provide patient education as appropriate  2/7/2024 1329 by Eli Catherine RN  Outcome: Completed  2/7/2024 0733 by Eli Catherine RN  Outcome: Progressing  Goal: Maintain proper alignment of affected body part  Description: INTERVENTIONS:  - Support, maintain and protect limb and body alignment  - Provide patient/ family with appropriate education  2/7/2024 1329 by Eli Catherine RN  Outcome: Completed  2/7/2024 0733 by Eli Catherine RN  Outcome: Progressing

## 2024-02-08 ENCOUNTER — TELEPHONE (OUTPATIENT)
Age: 69
End: 2024-02-08

## 2024-02-08 ENCOUNTER — TELEPHONE (OUTPATIENT)
Dept: OBGYN CLINIC | Facility: HOSPITAL | Age: 69
End: 2024-02-08

## 2024-02-08 LAB — MRSA NOSE QL CULT: NORMAL

## 2024-02-08 NOTE — TELEPHONE ENCOUNTER
Caller: Patient     Doctor: Judy     Reason for call: Patient stated she fell three times and feels unstable. Patient asked to discuss what she should do, call was warm transferred to nurse     
Caller: Patient     Doctor: Judy     Reason for call: Patient stating she fell and is having trouble getting up her  is on his way home to her she is scared and crying and wants to speak with clinical team   Please advise     Call back#: [phone]    
Received call from pt and she had R TKA w/Dr Kim on 2/6/24 and came home yesterday.  She states she fell twice in the bathroom while trying to get off toilet and once hit her back of  head on bathtub.  She was able to get up once by herself and the other time her  helped her.  Today, she fell while trying to get something from refrigerator to eat while holding onto the walker.  She thinks she hit her head again and landed on her bottom.  She called  and he came and helped her off the floor.  She is currently in a chair, legs elevated with icon right knee.  She is having pain in R knee that is 8/10.  She is due to take her pain meds 1 oxycodone 5mg and 1 tylenol 650mg that has been helping.  She feels her right knee is more swollen and tight. Able to wiggle toes.  Advised to elevate and ice.  DDI.  Is not aware of any other injury.  Pt is tearful, appears alert and oriented.  She denies h/a, visual problems.  States right knee is weak and gives out.  Pt is eating, encourage to drink fluids.  Pt is currently in a chair, legs elevated, ice to right knee, water bottle at bedside, and  home (as he came home from work).    Pt states she is scared of falling at home.  According to pt,  feels that she should be in rehab as she cannot be alone.  Pt fell total of 4 times.      Please review and advise further. Thank you.   
Spoke to patient again, following conversation with [facility].     Discussed knee immobilizer being in place and absolutely using the RW at this time. Discussed PT will work on strength and ROM with patient, and work on strengthening the Quad.    Pt aware, thankful for call/guidance.   pt encouraged to call me with questions, concerns or issues.    
Spoke to patient. She reports two falls this AM (one fall last night originally discussed in Postoperative Follow up Call Assessment).    She reports she was trying to make lunch when she fell, and didn't have her hands on the walker. She does not think this is medication related and denies dizziness.    She denies any injury. We discussed it sounds like she is too much without the walker. We anticipate her needing the RW for support and steadiness at this time. She states she fell getting food out of the fridge.     We again discussed using the RW consistently right now, needing It to help support her due to operative leg not being able to sustain full pressure and due to surgery, not having her typical balance at this time.     She Is aware I will notify the surgeon, and I advised her to continue to keep me posted.   
DISCHARGE

## 2024-02-08 NOTE — TELEPHONE ENCOUNTER
"Patient contacted for a postoperative follow up assessment. Patient reports doing \"pretty good\" and reports a current 6/10. She is ambulating with the RW, she has OP tomorrow at 1145am. She reports dressing is dry with no drainage, and swelling is \"a little worse\" and we discussed icing.     She reports she did fall last evening, hitting back/buttocks onto the floor. Denies injuring knee, denies any change in ambulation. She actually states she is ambulating better today and able to lift the leg better today.     We reviewed patients AVS medication list. Patient is taking Tylenol 975mg every 8 hours, Oxycodone 5mg PRN, ASA 325mg BID, Colace 100mg BID. Patient has not yet had a BM.  We discussed increasing fluid and fiber intake to assist as well.     Patient denies nausea, vomiting, abdominal pain, chest pain, shortness of breath, fever, dizziness and calf pain. Patient does not have any other questions or concerns at this time. Pt was encouraged to call with any questions, concerns or issues.    "

## 2024-02-09 ENCOUNTER — OFFICE VISIT (OUTPATIENT)
Dept: PHYSICAL THERAPY | Facility: CLINIC | Age: 69
End: 2024-02-09
Payer: COMMERCIAL

## 2024-02-09 DIAGNOSIS — M17.11 PRIMARY OSTEOARTHRITIS OF RIGHT KNEE: ICD-10-CM

## 2024-02-09 DIAGNOSIS — M25.561 CHRONIC PAIN OF RIGHT KNEE: Primary | ICD-10-CM

## 2024-02-09 DIAGNOSIS — G89.29 CHRONIC PAIN OF RIGHT KNEE: Primary | ICD-10-CM

## 2024-02-09 PROCEDURE — 97110 THERAPEUTIC EXERCISES: CPT | Performed by: PHYSICAL THERAPIST

## 2024-02-09 NOTE — PROGRESS NOTES
PT Re-Evaluation     Today's date: 2024  Patient name: Ina Tolbert  : 1955  MRN: [id number]  Referring provider: Anshul Crowder:   Encounter Diagnosis     ICD-10-CM    1. Chronic pain of right knee  M25.561     G89.29       2. Primary osteoarthritis of right knee  M17.11                      Assessment  Assessment details: Ina Tolbert is a 68 y.o. female who presents to physical therapy with pain, decreased LE range of motion, decreased LE strength, fair balance, impaired function and decreased activity tolerance. Patient's clinical presentation is consistent with their referring diagnosis of Primary osteoarthritis of right knee  (primary encounter diagnosis)  Chronic pain of right knee. The pt presents with functional limitations of ADLs, recreational activities, ambulation, performing household chores and stair negotiation. Pt would benefit from physical therapy services to address these limitations and maximize function. Pt was instructed and educated on home exercise program today and demonstrates understanding.     Impairments: abnormal gait, abnormal muscle firing, abnormal muscle tone, abnormal or restricted ROM, abnormal movement, activity intolerance, impaired balance, impaired physical strength, lacks appropriate home exercise program, pain with function, weight-bearing intolerance, poor posture  and poor body mechanics  Understanding of Dx/Px/POC: good   Prognosis: good    Goals  Short term goals  (6 weeks)  1.  Patient will have no pain right knee  2 . Patient will have full range of motion right knee  3.  Patient will report a 50% improvement with activities of daily living   4.  Patient will decrease girth of right knee by 1 to 2 cm.     Long term goals - (12 weeks)  1.  Patient will be independent with home exercise program  2.  Patient will have no gait deviations ambulating on level surfaces.   3.  Patient will report 80 % improvement with activity of daily living function.   4.   Patient will negotiate stairs up and down pain free with use of one railing.       Plan  Plan details: 2 to 3 visits per week for 12 weeks  Patient would benefit from: PT leannal and skilled physical therapy  Planned modality interventions: cryotherapy  Planned therapy interventions: ADL training, balance/weight bearing training, joint mobilization, manual therapy, massage, neuromuscular re-education, patient education, postural training, strengthening, stretching, functional ROM exercises, therapeutic activities, therapeutic exercise, gait training and home exercise program  Duration in weeks: 12  Treatment plan discussed with: patient        Subjective Evaluation    History of Present Illness  Mechanism of injury: She reports in 2021 she was diagnosed with right knee OA by Dr. Kim.  She has had 3 injections and fluid removed.  She is now in significant pain and her function is significantly decreased.  She is scheduled for right TKA next 24.  She was referred to PT.    Re-Eval- - She underwent right TKA on Tuesday.  She stayed in the hospital one night.  She was discharged to home.  She was referred back to PT.  Patient Goals  Patient goals for therapy: independence with ADLs/IADLs  Patient's goals regarding treatment: walk normally for 1/2 mile.  Pain  Current pain ratin  At best pain ratin  At worst pain rating: 10  Location: Right knee  Quality: sharp and dull ache  Relieving factors: medications, ice, change in position and rest  Aggravating factors: walking, standing and stair climbing (driving)    Exercise history: Housework          Objective     Palpation     Right   Hypertonic in the distal biceps femoris, distal semimembranosus and distal semitendinosus.     Active Range of Motion   Left Knee   Flexion: 131 degrees   Extension: 0 degrees     Right Knee   Flexion: 93 degrees   Extension: -2 degrees     Mobility   Patellar Mobility:     Right Knee   Hypomobile: medial, lateral,  superior and inferior     Strength/Myotome Testing     Left Knee   Flexion: 4+  Extension: 4+    Right Knee   Flexion: 3-  Extension: 3-    Swelling     Left Knee Girth Measurement (cm)   Joint line: 27.1 cm    Right Knee Girth Measurement (cm)   Joint line: 38.8 cm    Ambulation   Weight-Bearing Status   Assistive device used: front-wheeled walker    Observational Gait   Gait: antalgic   Decreased walking speed and right stance time.                  Eval/ Re-eval Auth #/ Referral # Total visits Start date  Expiration date Total active units  Total manual units  PT only or  PT+OT?   1/29/24 1/29 2/9             Total units authorized                Total units remaining                    Precautions:  Asthma      Specialty Daily Treatment Diary     Manuals 1/29/24 2/9/24      Visit # 1 2      PF mobs        Stretch HS Quad        Knee PROM  5 min              Flex AROM 88 deg 93 deg      Ext AROM -2 deg -2 deg              Warm-up        NuStep        Neuro Re-Ed        Qset 20 20      SLR        Ankle pumps 20 20      HR  20              Ther Ex        Mini squat        Side step        Knee flex        LAQ  20      Heel slides 20 20      Hip Abd stand  20                      Ther Activity                        Gait Training        W/ rw reviewed Reviewed proper immobilizer placement              Modalities        CP

## 2024-02-10 ENCOUNTER — HOSPITAL ENCOUNTER (EMERGENCY)
Facility: HOSPITAL | Age: 69
Discharge: HOME | End: 2024-02-10
Attending: STUDENT IN AN ORGANIZED HEALTH CARE EDUCATION/TRAINING PROGRAM
Payer: COMMERCIAL

## 2024-02-10 VITALS
RESPIRATION RATE: 20 BRPM | DIASTOLIC BLOOD PRESSURE: 72 MMHG | SYSTOLIC BLOOD PRESSURE: 145 MMHG | HEART RATE: 96 BPM | WEIGHT: 175 LBS | OXYGEN SATURATION: 95 % | TEMPERATURE: 98.1 F | BODY MASS INDEX: 29.12 KG/M2

## 2024-02-10 DIAGNOSIS — T14.8XXA BRUISING: Primary | ICD-10-CM

## 2024-02-10 PROCEDURE — 99284 EMERGENCY DEPT VISIT MOD MDM: CPT | Performed by: STUDENT IN AN ORGANIZED HEALTH CARE EDUCATION/TRAINING PROGRAM

## 2024-02-10 PROCEDURE — 99283 EMERGENCY DEPT VISIT LOW MDM: CPT

## 2024-02-10 NOTE — ED PROVIDER NOTES
History  Chief Complaint   Patient presents with    Post-op Problem     States she had a right TKR on 2/6. States today she  noted a very large bruise right inner thigh that she noticed a hour ago that she did not note this morning . Patient on  mg twice a day     Patient is a 68-year-old female, past medical history including arthritis, asthma, fibromyalgia, GERD, hyperlipidemia, and status post right TKR on 2/6/2024, who presents to the emergency department for bruising to her right leg.  Patient states she has been feeling fine and woke up this morning without bruising.  When she later looked down at her thighs she noticed significant bruising.  She now presents for further evaluation.  Also endorses some slight decrease sensation to the medial right calf.  Denies any trauma to her legs.  States the area is painful.  She states she was not told about bruising in this location prompting visit to the ED.  No other complaints or concerns.        Prior to Admission Medications   Prescriptions Last Dose Informant Patient Reported? Taking?   Calcium Carb-Cholecalciferol 600-12.5 MG-MCG CAPS  Self Yes No   Sig: Take by mouth daily in the early morning   DULoxetine (CYMBALTA) 60 mg delayed release capsule  Self No No   Sig: TAKE 1 CAPSULE BY MOUTH EVERY DAY   Magnesium 400 MG TABS  Self Yes No   Sig: Take 800 mg by mouth daily   QUEtiapine (SEROquel) 100 mg tablet  Self No No   Sig: TAKE 1 TABLET BY MOUTH DAILY AT BEDTIME   Sodium Fluoride 5000 PPM 1.1 % PSTE  Self Yes No   Sig: USE ONCE DAILY PREFERABLY AT NIGHT   acetaminophen (TYLENOL) 325 mg tablet   No No   Sig: Take 3 tablets (975 mg total) by mouth every 8 (eight) hours   albuterol (ProAir HFA) 90 mcg/act inhaler  Self No No   Sig: Inhale 2 puffs every 6 (six) hours as needed for wheezing   aspirin 325 mg tablet   No No   Sig: Take 1 tablet (325 mg total) by mouth 2 (two) times a day   docusate sodium (COLACE) 100 mg capsule   No No   Sig: Take 1 capsule  (100 mg total) by mouth 2 (two) times a day   esomeprazole (NexIUM) 40 MG capsule  Self No No   Sig: TAKE 1 CAPSULE BY MOUTH EVERY DAY BEFORE BREAKFAST   gabapentin (NEURONTIN) 300 mg capsule  Self No No   Sig: Take 1 capsule (300 mg total) by mouth 2 (two) times a day   levothyroxine 50 mcg tablet  Self No No   Sig: TAKE 1 TABLET BY MOUTH EVERY DAY   mesalamine (LIALDA) 1.2 g EC tablet  Self No No   Sig: Take 4 tablets (4.8 g total) by mouth daily with breakfast   midodrine (PROAMATINE) 10 MG tablet  Self No No   Sig: Take 1 tablet (10 mg total) by mouth in the morning Do not start before November 18, 2023.   Patient taking differently: Take 5 mg by mouth in the morning   naloxone (NARCAN) 4 mg/0.1 mL nasal spray   No No   Sig: Administer 1 spray into a nostril. If no response after 2-3 minutes, give another dose in the other nostril using a new spray.   oxyCODONE (Roxicodone) 5 immediate release tablet   No No   Sig: Take 1-2 tablets by mouth every 6 hours as needed for pain      Facility-Administered Medications: None       Past Medical History:   Diagnosis Date    Abdominal adhesions     Alcoholism (HCC) 1996    in remission    Anesthesia complication     difficult to wake up    Anxiety     Arthritis     Asthma     with exertion    Cancer (HCC)     melanoma    Colon polyp     Crohn's disease (HCC)     Depression     Depression     Disease of thyroid gland     Fibromyalgia     Fibromyalgia, primary     Fractures 2006    GERD (gastroesophageal reflux disease)     History of cholecystectomy 09/07/2019    Hyperlipidemia     Infected tooth 01/2024    Tooth #14-was treated with antibiotics-endodontal appt 2/1 may need root canal    Irregular heart beat     can be tachy; also has orthoststic hypotension    Irritable bowel syndrome 1990    Lazy eye     resolved: 3/27/17    Medical clearance for psychiatric admission 07/13/2021    Melanoma (HCC) 2010    Mild asthma 11/04/2020    Osteoarthritis 07/2018    Osteopenia      with joint pain-elevated DEV    Polymyalgia (HCC)     Psychiatric disorder     Shortness of breath     assoc with tachycardia    Stomach disorder 1995    Tinnitus     Wears glasses     for reading       Past Surgical History:   Procedure Laterality Date    ABDOMINAL SURGERY      lysis of adhesions x 2    APPENDECTOMY      CERVICAL FUSION      May 5,2021 and Jan. 01,2020    CHOLECYSTECTOMY      open    COLONOSCOPY      DILATION AND CURETTAGE OF UTERUS      FOOT SURGERY  05/2017    NECK SURGERY  01/2019 5/2021    NEUROMA EXCISION Right 05/26/2017    Procedure: EXCISION MASS / FIBROMA FOOT;  Surgeon: Jorden Crespo DPM;  Location: [facility];  Service:     PARS PLANA VITRECTOMY W/ REPAIR OF MACULAR HOLE  12/23/2020    NV ARTHRP KNE CONDYLE&PLATU MEDIAL&LAT COMPARTMENTS Right 2/6/2024    Procedure: ARTHROPLASTY KNEE TOTAL W ROBOT - RIGHT - PRESS FIT - OVERNIGHT;  Surgeon: Jairon Kim DO;  Location: [facility];  Service: Orthopedics    NV XCAPSL CTRC RMVL INSJ IO LENS PROSTH W/O ECP Left 12/06/2021    Procedure: EXTRACTION EXTRACAPSULAR CATARACT PHACO INTRAOCULAR LENS (IOL);  Surgeon: Mandeep Chavez MD;  Location: [facility] MAIN OR;  Service: Ophthalmology    RIGHT OOPHORECTOMY      UPPER GASTROINTESTINAL ENDOSCOPY  5/2019    WISDOM TOOTH EXTRACTION      x4       Family History   Problem Relation Age of Onset    Heart disease Mother     Stroke Mother 62    COPD Mother     Arthritis Mother     Asthma Mother     Hypertension Father     Dislocations Sister     Multiple sclerosis Sister     Neurological problems Sister     Scoliosis Sister     Depression Sister     Mental illness Sister     No Known Problems Maternal Grandmother     No Known Problems Maternal Grandfather     No Known Problems Paternal Grandmother     No Known Problems Paternal Grandfather     Kidney disease Brother         kidney transplant    No Known Problems Maternal Aunt     No Known Problems Maternal Uncle     No Known Problems Paternal Aunt     No  Known Problems Paternal Uncle     ADD / ADHD Neg Hx     Anesthesia problems Neg Hx     Cancer Neg Hx     Clotting disorder Neg Hx     Collagen disease Neg Hx     Diabetes Neg Hx     Dislocations Neg Hx     Learning disabilities Neg Hx     Neurological problems Neg Hx     Osteoporosis Neg Hx     Rheumatologic disease Neg Hx     Scoliosis Neg Hx     Vascular Disease Neg Hx      I have reviewed and agree with the history as documented.    E-Cigarette/Vaping    E-Cigarette Use Never User      E-Cigarette/Vaping Substances    Nicotine No     THC No     CBD No     Flavoring No     Other No     Unknown No      Social History     Tobacco Use    Smoking status: Never     Passive exposure: Never    Smokeless tobacco: Never   Vaping Use    Vaping status: Never Used   Substance Use Topics    Alcohol use: Not Currently    Drug use: No       Review of Systems   Skin:  Positive for color change. Negative for wound.   All other systems reviewed and are negative.      Physical Exam  Physical Exam  Vitals and nursing note reviewed.   Constitutional:       General: She is not in acute distress.     Appearance: She is well-developed. She is not ill-appearing, toxic-appearing or diaphoretic.   HENT:      Head: Normocephalic and atraumatic.      Right Ear: External ear normal.      Left Ear: External ear normal.      Nose: Nose normal.   Eyes:      General: Lids are normal. No scleral icterus.  Cardiovascular:      Rate and Rhythm: Normal rate and regular rhythm.   Pulmonary:      Effort: Pulmonary effort is normal. No respiratory distress.      Breath sounds: Normal breath sounds.   Musculoskeletal:         General: No deformity. Normal range of motion.      Cervical back: Normal range of motion and neck supple.      Comments: There is a large bruise noted to the right inner thigh.  Soft.  No crepitus.  Palpable pedal pulses.   Skin:     General: Skin is warm and dry.   Neurological:      General: No focal deficit present.      Mental  Status: She is alert.   Psychiatric:         Mood and Affect: Mood normal.         Behavior: Behavior normal.               Vital Signs  ED Triage Vitals [02/10/24 1610]   Temperature Pulse Respirations Blood Pressure SpO2   98.1 °F (36.7 °C) 96 20 145/72 95 %      Temp Source Heart Rate Source Patient Position - Orthostatic VS BP Location FiO2 (%)   Tympanic Monitor Sitting Left arm --      Pain Score       5           Vitals:    02/10/24 1610   BP: 145/72   Pulse: 96   Patient Position - Orthostatic VS: Sitting         Visual Acuity      ED Medications  Medications - No data to display    Diagnostic Studies  Results Reviewed       None                   No orders to display              Procedures  Procedures         ED Course  ED Course as of 02/10/24 1804   Sat Feb 10, 2024   1623 TT sent to ortho   1627 Discussed with orthopedics.  Normal postoperative bruising & symptoms.  From the tourniquet.  Okay for discharge   1630 Patient reevaluated.  Resting comfortably.  Discussed plans for discharge.  Patient in agreement.  Recommended orthopedic follow-up.  Return precautions discussed.  Patient verbalized understanding and agreed to plan of care.                               SBIRT 22yo+      Flowsheet Row Most Recent Value   Initial Alcohol Screen: US AUDIT-C     1. How often do you have a drink containing alcohol? 0 Filed at: 02/10/2024 1613   Audit-C Score 0 Filed at: 02/10/2024 1613   SHARON: How many times in the past year have you...    Used an illegal drug or used a prescription medication for non-medical reasons? Never Filed at: 02/10/2024 1613                      Medical Decision Making  Patient is a 68 y.o. female who presents to the ED for postoperative bruising after a right TKA.  Patient is nontoxic, well-appearing.  Vitals are stable.  On exam there is a large bruise to the right inner thigh.     Differential includes but is not limited to: Normal postoperative bruising.  Doubt active  "bleeding/extravasation as there has been no recent trauma.    Plan: Will discuss with orthopedics                 Portions of the record may have been created with voice recognition software. Occasional wrong word or \"sound a like\" substitutions may have occurred due to the inherent limitations of voice recognition software. Read the chart carefully and recognize, using context, where substitutions have occurred.    Problems Addressed:  Bruising: acute illness or injury             Disposition  Final diagnoses:   Bruising     Time reflects when diagnosis was documented in both MDM as applicable and the Disposition within this note       Time User Action Codes Description Comment    2/10/2024  4:29 PM Jasper Negron Add [T14.8XXA] Bruising           ED Disposition       ED Disposition   Discharge    Condition   Stable    Date/Time   Sat Feb 10, 2024 1629    Comment   Ina Tolbert discharge to home/self care.                   Follow-up Information       Follow up With Specialties Details Why Contact Info Additional Information    [employer]folink  Call in 1 day  [phone]       [facility] Emergency Medicine   [address] [facility] [address] [phone] [facility], [address] [place] [address], [id number]    Jairon Kim,  Orthopedic Surgery Schedule an appointment as soon as possible for a visit   [address] [facility] [address], [address]  [facility] [address] [phone]               Discharge Medication List as of 2/10/2024  4:30 PM        CONTINUE these medications which have NOT CHANGED    Details   acetaminophen (TYLENOL) 325 mg tablet Take 3 tablets (975 mg total) by mouth every 8 (eight) hours, Starting Wed 2/7/2024, No Print      albuterol (ProAir HFA) 90 mcg/act inhaler Inhale 2 puffs every 6 (six) hours as needed for wheezing, Starting Fri 12/29/2023, Normal      aspirin 325 mg tablet Take 1 " tablet (325 mg total) by mouth 2 (two) times a day, Starting Wed 2/7/2024, Normal      Calcium Carb-Cholecalciferol 600-12.5 MG-MCG CAPS Take by mouth daily in the early morning, Historical Med      docusate sodium (COLACE) 100 mg capsule Take 1 capsule (100 mg total) by mouth 2 (two) times a day, Starting Wed 2/7/2024, Normal      DULoxetine (CYMBALTA) 60 mg delayed release capsule TAKE 1 CAPSULE BY MOUTH EVERY DAY, Starting Mon 1/22/2024, Normal      esomeprazole (NexIUM) 40 MG capsule TAKE 1 CAPSULE BY MOUTH EVERY DAY BEFORE BREAKFAST, Starting Mon 12/11/2023, Normal      gabapentin (NEURONTIN) 300 mg capsule Take 1 capsule (300 mg total) by mouth 2 (two) times a day, Starting Mon 11/20/2023, Normal      levothyroxine 50 mcg tablet TAKE 1 TABLET BY MOUTH EVERY DAY, Normal      Magnesium 400 MG TABS Take 800 mg by mouth daily, Historical Med      mesalamine (LIALDA) 1.2 g EC tablet Take 4 tablets (4.8 g total) by mouth daily with breakfast, Starting Mon 12/11/2023, Until Sun 3/10/2024, Normal      midodrine (PROAMATINE) 10 MG tablet Take 1 tablet (10 mg total) by mouth in the morning Do not start before November 18, 2023., Starting Sat 11/18/2023, Until Tue 2/6/2024, Normal      naloxone (NARCAN) 4 mg/0.1 mL nasal spray Administer 1 spray into a nostril. If no response after 2-3 minutes, give another dose in the other nostril using a new spray., Normal      oxyCODONE (Roxicodone) 5 immediate release tablet Take 1-2 tablets by mouth every 6 hours as needed for pain, Normal      QUEtiapine (SEROquel) 100 mg tablet TAKE 1 TABLET BY MOUTH DAILY AT BEDTIME, Starting Mon 1/29/2024, Normal      Sodium Fluoride 5000 PPM 1.1 % PSTE USE ONCE DAILY PREFERABLY AT NIGHT, Historical Med             No discharge procedures on file.    PDMP Review         Value Time User    PDMP Reviewed  Yes 2/5/2024  4:15 PM Blaze Botello PA-C            ED Provider  Electronically Signed by             Jasper Negron  DO  02/10/24 1804

## 2024-02-10 NOTE — DISCHARGE INSTRUCTIONS
You were seen in the ED for bruising. This is most likely from the tourniquet. Please follow up with Dr Kim. Return to the ED for any new or concerning symptoms.

## 2024-02-12 ENCOUNTER — OFFICE VISIT (OUTPATIENT)
Dept: PHYSICAL THERAPY | Facility: CLINIC | Age: 69
End: 2024-02-12
Payer: COMMERCIAL

## 2024-02-12 DIAGNOSIS — M17.11 PRIMARY OSTEOARTHRITIS OF RIGHT KNEE: ICD-10-CM

## 2024-02-12 DIAGNOSIS — G89.29 CHRONIC PAIN OF RIGHT KNEE: Primary | ICD-10-CM

## 2024-02-12 DIAGNOSIS — M25.561 CHRONIC PAIN OF RIGHT KNEE: Primary | ICD-10-CM

## 2024-02-12 PROCEDURE — 97110 THERAPEUTIC EXERCISES: CPT | Performed by: PHYSICAL THERAPIST

## 2024-02-12 NOTE — PROGRESS NOTES
Daily Note     Today's date: 2024  Patient name: Ina Tolbert  : 1955  MRN: [id number]  Referring provider: Jairon Kim DO  Dx:   Encounter Diagnosis     ICD-10-CM    1. Chronic pain of right knee  M25.561     G89.29       2. Primary osteoarthritis of right knee  M17.11                      Subjective: She reports HEP compliance over the weekend.  She has 6/10 soreness today right knee.      Objective: See treatment diary below      Assessment: Tolerated treatment well. Patient would benefit from continued PT.  She had improved active knee ext compared to Friday's session.  Taught her heel slides on the floor due to her report of difficulty performing heel slide lying down.      Plan: Continue per plan of care.        Eval/ Re-eval Auth #/ Referral # Total visits Start date  Expiration date Total active units  Total manual units  PT only or  PT+OT?   24            Total units authorized                Total units remaining                    Precautions:  Asthma      Specialty Daily Treatment Diary     Manuals 24     Visit # 1 2 3     PF mobs        Stretch HS Quad   3 min     Knee PROM  5 min 5 min             Flex AROM 88 deg 93 deg 88 deg     Ext AROM -2 deg -2 deg -2 deg             Warm-up        NuStep        Neuro Re-Ed        Qset 20 20 20     SLR   1x5 AA     Ankle pumps 20 20 20     HR  20 20             Ther Ex        Mini squat        Side step        Knee flex        LAQ  20 20     Heel slides 20 20 20     Hip Abd stand  20 20                     Ther Activity                        Gait Training        W/ rw reviewed Reviewed proper immobilizer placement              Modalities        CP   5 min

## 2024-02-14 ENCOUNTER — OFFICE VISIT (OUTPATIENT)
Dept: PHYSICAL THERAPY | Facility: CLINIC | Age: 69
End: 2024-02-14
Payer: COMMERCIAL

## 2024-02-14 ENCOUNTER — TELEPHONE (OUTPATIENT)
Dept: PSYCHIATRY | Facility: CLINIC | Age: 69
End: 2024-02-14

## 2024-02-14 DIAGNOSIS — G89.29 CHRONIC PAIN OF RIGHT KNEE: Primary | ICD-10-CM

## 2024-02-14 DIAGNOSIS — M17.11 PRIMARY OSTEOARTHRITIS OF RIGHT KNEE: ICD-10-CM

## 2024-02-14 DIAGNOSIS — M25.561 CHRONIC PAIN OF RIGHT KNEE: Primary | ICD-10-CM

## 2024-02-14 DIAGNOSIS — F41.9 ANXIETY: Primary | ICD-10-CM

## 2024-02-14 PROCEDURE — 97112 NEUROMUSCULAR REEDUCATION: CPT

## 2024-02-14 PROCEDURE — 97110 THERAPEUTIC EXERCISES: CPT

## 2024-02-14 RX ORDER — DULOXETIN HYDROCHLORIDE 20 MG/1
20 CAPSULE, DELAYED RELEASE ORAL
Qty: 30 CAPSULE | Refills: 3 | Status: SHIPPED | OUTPATIENT
Start: 2024-02-14

## 2024-02-14 NOTE — TELEPHONE ENCOUNTER
Called patient and she is aware that a script will be sent to [facility] pharmacy on [facility] for Cymbalta 20 mg to be taken at bedtime and to continue taking the Cymbalta 60 mg in am.    Patient understood and is aware.

## 2024-02-14 NOTE — TELEPHONE ENCOUNTER
Received call from patient stating that she just had total knee on 2/6/2024.  She is trying to work thru her pain, taking medication by Dr Kim, but patient states it is more anxiety and emotional she is having trouble with.  Patient feels isolated, total dependency on someone else, loss off job, depression about the situation and anxious.  Patient does get out for PT. Patient has appointment on 2/27/2024 but wanted to know if there was something that could be done in the meantime before appointment.  Please advise for further instructions.

## 2024-02-14 NOTE — PROGRESS NOTES
"Daily Note     Today's date: 2024  Patient name: Ina Tolbert  : 1955  MRN: [id number]  Referring provider: Jairon Kim DO  Dx:   Encounter Diagnosis     ICD-10-CM    1. Chronic pain of right knee  M25.561     G89.29       2. Primary osteoarthritis of right knee  M17.11           Subjective: Patient reports increased burning sensation since last visit. Prior to session pt reporting 4/10 pain.       Objective: See treatment diary below      Assessment: Tolerated treatment fair. Patient demonstrating similar knee ROM today compared to previous visit. Patient also reporting symptoms of depression to providing PT today and that she had called the psychiatrist. Patient encouraged to continue following up with mental health provider. Patient unable to complete set of 10 short arc quads today secondary to increased pain despite decreased ROM and assist. Overall patient with steady improvement but main barrier being pain with activity. Patient demonstrated fatigue post treatment and would benefit from continued PT to continue with gains in functional mobility.      Plan: Continue per plan of care.  Remove bandage NV.      Eval/ Re-eval Auth #/ Referral # Total visits Start date  Expiration date Total active units  Total manual units  PT only or  PT+OT?   24           Total units authorized           MORE AUTH     Total visits remaining  11 10 9 8               Precautions:  Asthma      Specialty Daily Treatment Diary     Manuals 24    Visit # 1 2 3 4    PF mobs        Stretch HS Quad   3 min STM Quad 5'    Knee PROM  5 min 5 min             Flex AROM 88 deg 93 deg 88 deg 89 deg    Ext AROM -2 deg -2 deg -2 deg -3 deg            Warm-up        NuStep    8' L1    Neuro Re-Ed        Qset 20 20 20 20x5\"    SAQ    2x10    SLR   1x5 AA 2x5    Ankle pumps 20 20 20 30x    HR  20 20 2x10            Ther Ex        Knee PROM    5' "    Mini squat        Side step    5 laps at the hi low table    Knee flex    Standing knee flexion    LAQ  20 20 20x *pain    Heel slides 20 20 20 2x10 with SOS and sliding board    Hip Abd stand  20 20 ABD 2x10 b/l  EXT 2x10  b/l                    Ther Activity                        Gait Training        W/ rw reviewed Reviewed proper immobilizer placement              Modalities        CP   5 min Deferred - to do at home

## 2024-02-15 ENCOUNTER — PATIENT OUTREACH (OUTPATIENT)
Dept: OBGYN CLINIC | Facility: HOSPITAL | Age: 69
End: 2024-02-15

## 2024-02-15 NOTE — PROGRESS NOTES
Pt had arthroplasty of right knee on 02/06/2024 and was DC to home as planned. SWCM reviewed NN postoperative assessment from 02/08/2024 and no needs noted. I attempted to reach pt today and advised her to please return my call with any questions or concerns and if not to please disregard. SWCM will remain available as needed.

## 2024-02-16 ENCOUNTER — OFFICE VISIT (OUTPATIENT)
Dept: PHYSICAL THERAPY | Facility: CLINIC | Age: 69
End: 2024-02-16
Payer: COMMERCIAL

## 2024-02-16 ENCOUNTER — TELEPHONE (OUTPATIENT)
Age: 69
End: 2024-02-16

## 2024-02-16 DIAGNOSIS — Z96.651 S/P TOTAL KNEE REPLACEMENT NOT USING CEMENT, RIGHT: ICD-10-CM

## 2024-02-16 DIAGNOSIS — M25.561 CHRONIC PAIN OF RIGHT KNEE: Primary | ICD-10-CM

## 2024-02-16 DIAGNOSIS — M17.11 PRIMARY OSTEOARTHRITIS OF RIGHT KNEE: ICD-10-CM

## 2024-02-16 DIAGNOSIS — G89.29 CHRONIC PAIN OF RIGHT KNEE: Primary | ICD-10-CM

## 2024-02-16 PROCEDURE — 97110 THERAPEUTIC EXERCISES: CPT | Performed by: PHYSICAL THERAPIST

## 2024-02-16 PROCEDURE — 97112 NEUROMUSCULAR REEDUCATION: CPT | Performed by: PHYSICAL THERAPIST

## 2024-02-16 RX ORDER — OXYCODONE HYDROCHLORIDE 5 MG/1
TABLET ORAL
Qty: 40 TABLET | Refills: 0 | Status: SHIPPED | OUTPATIENT
Start: 2024-02-16

## 2024-02-16 RX ORDER — OXYCODONE HYDROCHLORIDE 5 MG/1
TABLET ORAL
Qty: 40 TABLET | Refills: 0 | Status: CANCELLED | OUTPATIENT
Start: 2024-02-16

## 2024-02-16 NOTE — TELEPHONE ENCOUNTER
Patient is still going through physical therapy and still having some pain.     Reason for call:   [x] Refill   [] Prior Auth  [] Other:     Office:   [] PCP/Provider -   [x] Specialty/Provider - Ortho     Medication: Oxycodone     Dose/Frequency: 5 mg immediate release tablet. Take 1-2 tablets by mouth every 6 hours as needed     Quantity: 40    Pharmacy: [facility]/pharmacy #[id number] [address] [phone]     Does the patient have enough for 3 days?   [] Yes   [x] No - Send as HP to POD

## 2024-02-16 NOTE — PROGRESS NOTES
"Daily Note     Today's date: 2024  Patient name: Ina Tolbert  : 1955  MRN: [id number]  Referring provider: Jairon Kim DO  Dx:   Encounter Diagnosis     ICD-10-CM    1. Chronic pain of right knee  M25.561     G89.29       2. Primary osteoarthritis of right knee  M17.11           Subjective: Her pain is a little higher today.  Pain = 5/10.  She also notes having difficulty getting to sleep last night.      Objective: See treatment diary below      Assessment: Tolerated treatment fair. Her Mepilex dressing was removed today.  The was no drainage and incision is healing well at this time.  She is making regular gains in her ROM measurements.        Plan: Continue per plan of care.         Eval/ Re-eval Auth #/ Referral # Total visits Start date  Expiration date Total active units  Total manual units  PT only or  PT+OT?   24          Total units authorized           MORE AUTH     Total visits remaining  11 10 9 8               Precautions:  Asthma      Specialty Daily Treatment Diary     Manuals 24   Visit # 1 2 3 4 5   PF mobs        Stretch HS Quad   3 min STM Quad 5' 3 min   Knee PROM  5 min 5 min             Flex AROM 88 deg 93 deg 88 deg 89 deg 92 deg   Ext AROM -2 deg -2 deg -2 deg -3 deg -2 deg           Warm-up        NuStep    8' L1 6 min   Neuro Re-Ed        Qset 20 20 20 20x5\" 20   SAQ    2x10 20   SLR   1x5 AA 2x5 2x5 AA   Ankle pumps 20 20 20 30x --   HR  20 20 2x10 20           Ther Ex        Knee PROM    5' 5 min   Mini squat        Side step    5 laps at the hi low table 5 laps at table   Knee flex    Standing knee flexion 20   LAQ  20 20 20x *pain 20   Heel slides 20 20 20 2x10 with SOS and sliding board 20 on pnut   Hip Abd stand  20 20 ABD 2x10 b/l  EXT 2x10  b/l --   Leg press     45#  2x10           Ther Activity                        Gait Training        W/ rw reviewed Reviewed " proper immobilizer placement              Modalities        CP   5 min Deferred - to do at home

## 2024-02-16 NOTE — TELEPHONE ENCOUNTER
PA for OXY 5 MG    Submitted via    []CMM-KEY     [x]SurescriWonderflowCase ID: [id number]     []Faxed to plan   []Other website   []Phone call Case ID #     Office notes sent, clinical questions answered. Awaiting determination    Turnaround time for your insurance to make a decision on your Prior Authorization can take 7-21 business days.

## 2024-02-19 ENCOUNTER — OFFICE VISIT (OUTPATIENT)
Dept: PHYSICAL THERAPY | Facility: CLINIC | Age: 69
End: 2024-02-19
Payer: COMMERCIAL

## 2024-02-19 DIAGNOSIS — M25.561 CHRONIC PAIN OF RIGHT KNEE: Primary | ICD-10-CM

## 2024-02-19 DIAGNOSIS — G89.29 CHRONIC PAIN OF RIGHT KNEE: Primary | ICD-10-CM

## 2024-02-19 DIAGNOSIS — M17.11 PRIMARY OSTEOARTHRITIS OF RIGHT KNEE: ICD-10-CM

## 2024-02-19 PROCEDURE — 97112 NEUROMUSCULAR REEDUCATION: CPT | Performed by: PHYSICAL THERAPIST

## 2024-02-19 PROCEDURE — 97110 THERAPEUTIC EXERCISES: CPT | Performed by: PHYSICAL THERAPIST

## 2024-02-19 NOTE — PROGRESS NOTES
"Daily Note     Today's date: 2024  Patient name: Ina Tolbert  : 1955  MRN: [id number]  Referring provider: Jairon Kim DO  Dx:   Encounter Diagnosis     ICD-10-CM    1. Chronic pain of right knee  M25.561     G89.29       2. Primary osteoarthritis of right knee  M17.11           Subjective: Her pain is not bad today.      Objective: See treatment diary below      Assessment: Tolerated treatment fair. Ambulated well using a cane today.  She agreed to practice with the cane for indoor distances and to still use the rolling walker for outside ambulation.        Plan: Continue per plan of care.   Assess cane use with possible progression to cane next session.       Eval/ Re-eval Auth #/ Referral # Total visits Start date  Expiration date Total active units  Total manual units  PT only or  PT+OT?   24         Total units authorized  1/12 2/12 3/12 4/12 5/12 6/12    MORE AUTH     Total visits remaining  11 10 9 8 7 6             Precautions:  Asthma      Specialty Daily Treatment Diary     Manuals 24   Visit # 2 3 4 5 6-foto   PF mobs     2 min   Stretch HS Quad  3 min STM Quad 5' 3 min 3 min   Knee PROM 5 min 5 min              Flex AROM 93 deg 88 deg 89 deg 92 deg 99 deg   Ext AROM -2 deg -2 deg -3 deg -2 deg -1 deg           Warm-up        NuStep   8' L1 6 min 6 min   Neuro Re-Ed        Qset 20 20 20x5\" 20 20   SAQ   2x10 20 20   SLR  1x5 AA 2x5 2x5 AA 2x5 AA   Ankle pumps 20 20 30x -- --   HR 20 20 2x10 20 --           Ther Ex        Knee PROM   5' 5 min 5 min   Mini squat        Side step   5 laps at the hi low table 5 laps at table 5 laps   Knee flex   Standing knee flexion 20 20   LAQ 20 20 20x *pain 20 20   Heel slides 20 20 2x10 with SOS and sliding board 20 on pnut 30   Hip Abd stand 20 20 ABD 2x10 b/l  EXT 2x10  b/l --    Leg press    45#  2x10 55#   3x10           Ther Activity           "              Gait Training        W/ rw Reviewed proper immobilizer placement               Modalities        CP  5 min Deferred - to do at home

## 2024-02-20 ENCOUNTER — NURSE TRIAGE (OUTPATIENT)
Age: 69
End: 2024-02-20

## 2024-02-20 NOTE — TELEPHONE ENCOUNTER
"CHLOE FROM [facility], WOULD LIKE UPDATE ON STATUS OF PT REMICADE INFUSION, PT HAS NOT HAD INFUSION SINCE NOVEMBER 2023. IS A NEW RX NEEDED OR SHOULD SHE BE DISCHARGED FROM THEIR SERVICE? CHLOE CAN BE REACHED [phone].        Answer Assessment - Initial Assessment Questions  1. REASON FOR CALL or QUESTION: \"What is your reason for calling today?\" or \"How can I best help you?\" or \"What question do you have that I can help answer?\"      CHLOE FROM [facility], WOULD LIKE UPDATE ON STATUS OF PT REMICADE INFUSION, PT HAS NOT HAD INFUSION SINCE NOVEMBER 2023. IS A NEW RX NEEDED OR SHOULD SHE BE DISCHARGED FROM THEIR SERVICE? CHLOE CAN BE REACHED [phone].    Protocols used: Information Only Call - No Triage-ADULT-[place]    "

## 2024-02-21 ENCOUNTER — OFFICE VISIT (OUTPATIENT)
Dept: OBGYN CLINIC | Facility: CLINIC | Age: 69
End: 2024-02-21

## 2024-02-21 ENCOUNTER — APPOINTMENT (OUTPATIENT)
Dept: RADIOLOGY | Facility: CLINIC | Age: 69
End: 2024-02-21
Payer: COMMERCIAL

## 2024-02-21 ENCOUNTER — OFFICE VISIT (OUTPATIENT)
Dept: PHYSICAL THERAPY | Facility: CLINIC | Age: 69
End: 2024-02-21
Payer: COMMERCIAL

## 2024-02-21 VITALS
HEART RATE: 101 BPM | BODY MASS INDEX: 28.32 KG/M2 | WEIGHT: 170 LBS | SYSTOLIC BLOOD PRESSURE: 112 MMHG | DIASTOLIC BLOOD PRESSURE: 76 MMHG | HEIGHT: 65 IN

## 2024-02-21 DIAGNOSIS — Z96.651 S/P TOTAL KNEE REPLACEMENT NOT USING CEMENT, RIGHT: ICD-10-CM

## 2024-02-21 DIAGNOSIS — M17.11 PRIMARY OSTEOARTHRITIS OF RIGHT KNEE: ICD-10-CM

## 2024-02-21 DIAGNOSIS — Z96.651 S/P TOTAL KNEE REPLACEMENT NOT USING CEMENT, RIGHT: Primary | ICD-10-CM

## 2024-02-21 DIAGNOSIS — Z47.1 AFTERCARE FOLLOWING RIGHT KNEE JOINT REPLACEMENT SURGERY: ICD-10-CM

## 2024-02-21 DIAGNOSIS — Z96.651 AFTERCARE FOLLOWING RIGHT KNEE JOINT REPLACEMENT SURGERY: ICD-10-CM

## 2024-02-21 DIAGNOSIS — G89.29 CHRONIC PAIN OF RIGHT KNEE: Primary | ICD-10-CM

## 2024-02-21 DIAGNOSIS — M25.561 CHRONIC PAIN OF RIGHT KNEE: Primary | ICD-10-CM

## 2024-02-21 PROCEDURE — 97110 THERAPEUTIC EXERCISES: CPT | Performed by: PHYSICAL THERAPIST

## 2024-02-21 PROCEDURE — 97112 NEUROMUSCULAR REEDUCATION: CPT | Performed by: PHYSICAL THERAPIST

## 2024-02-21 PROCEDURE — 73562 X-RAY EXAM OF KNEE 3: CPT

## 2024-02-21 PROCEDURE — 99024 POSTOP FOLLOW-UP VISIT: CPT | Performed by: ORTHOPAEDIC SURGERY

## 2024-02-21 NOTE — TELEPHONE ENCOUNTER
2/21 per office notes from dr. Lopez pt has stopped remicade infusions. Can you please print Remicade DC order in media and have provider sign and fax to me at [phone] plz and ty

## 2024-02-21 NOTE — PROGRESS NOTES
"        Daily Note         Today's date: 2024  Patient name: Ina Tolbert  : 1955  MRN: [id number]  Referring provider: Jairon Kim DO  Dx:   Encounter Diagnosis     ICD-10-CM    1. Chronic pain of right knee  M25.561     G89.29       2. Primary osteoarthritis of right knee  M17.11           Subjective: Ina Tolbert reports she just saw dr. Kim and physician was super pleased with progress.        Objective: See treatment diary below    Assessment:   PLOC discussed with primary PT. Patient entered with RW but utilizes SPC or no assistive device at home due to close vicinity of furniture.   Patient continues to demonstrate slight loss of extension which improved with PROM.  Patient had an excellent tolerance to program today.       Plan:  progress as indicate by primary PT.         Eval/ Re-eval Auth #/ Referral # Total visits Start date  Expiration date Total active units  Total manual units  PT only or  PT+OT?   24        Total units authorized  1/12 2/12 3/12 4/12 5/12 6/12 7/12   MORE AUTH     Total visits remaining  11 10 9 8 7 6 5            Precautions:  Asthma      Specialty Daily Treatment Diary     Manuals 24   Visit # 2 3 4 5 6-foto 7   PF mobs     2 min Performed    Stretch HS Quad  3 min STM Quad 5' 3 min 3 min performed   Knee PROM 5 min 5 min                Flex AROM 93 deg 88 deg 89 deg 92 deg 99 deg 99    Ext AROM -2 deg -2 deg -3 deg -2 deg -1 deg -1            Warm-up         NuStep   8' L1 6 min 6 min NA   Neuro Re-Ed         Qset 20 20 20x5\" 20 20 20 x    SAQ   2x10 20 20 --   SLR  1x5 AA 2x5 2x5 AA 2x5 AA 10 x  2 AAROM   Ankle pumps 20 20 30x -- --    HR 20 20 2x10 20 -- 10 x 3            Ther Ex         Knee PROM   5' 5 min 5 min performed   Mini squat         Side step   5 laps at the hi low table 5 laps at table 5 laps At bar: 5 laps, 16 feet x 2    Knee " flex   Standing knee flexion 20 20 20 x    LAQ 20 20 20x *pain 20 20 20 x    Heel slides 20 20 2x10 with SOS and sliding board 20 on pnut 30 W/ peanut: 20x    Hip Abd stand 20 20 ABD 2x10 b/l  EXT 2x10  b/l --     Leg press    45#  2x10 55#   3x10 55 lb 10 x 3             Ther Activity                           Gait Training         W/ rw Reviewed proper immobilizer placement                 Modalities         CP  5 min Deferred - to do at home   5 min ant and post knee.

## 2024-02-21 NOTE — PROGRESS NOTES
Assessment/Plan:  1. S/P total knee replacement not using cement, right  XR knee 3 vw right non injury      2. Aftercare following right knee joint replacement surgery          Scribe Attestation      I,:  Blaze Botello PA-C am acting as a scribe while in the presence of the attending physician.:       I,:  Jairon Kim, DO personally performed the services described in this documentation    as scribed in my presence.:           Ina is a pleasant 68-year-old presenting today for follow-up 2 weeks after a robotic assisted press-fit right total knee arthroplasty.  She is doing quite well in her recovery at this time despite suffering a few falls initially after going home.  The prosthesis is stable on imaging and exam.  We have applauded her efforts at home and with therapy to regain motion, encouraged her to continue.  She will continue with aspirin for DVT prophylaxis for the next 4 weeks.  She may now get the incision wet in the shower, but should avoid direct scrubbing or saturation.  She can call if she needs a refill of oxycodone, but otherwise can rely on Tylenol for pain control.  Will plan to see her back in 4 weeks for clinical reevaluation.  She expressed understanding all of her questions were addressed today    Subjective: 2 weeks status post robotic assisted press-fit right total knee arthroplasty    Patient ID: Ina Tolbert is a 68 y.o. female presenting today for follow-up of surgery.  She reports that she initially had a few falls for the first few days after surgery.  Since then, she has done quite well.  She has not had any falls and has been participating physical therapy.  She is taking oxycodone only for therapy and occasionally at night to help her sleep.  She is compliant with aspirin for DVT prophylaxis and keeping the incision dry.  She has been working diligently at home as well to regain motion and strength.  She is still ambulating with a walker    Review of Systems    Constitutional: Negative.    HENT: Negative.     Eyes: Negative.    Respiratory: Negative.     Cardiovascular: Negative.    Gastrointestinal: Negative.    Endocrine: Negative.    Genitourinary: Negative.    Musculoskeletal:  Positive for arthralgias, joint swelling and myalgias.   Skin: Negative.    Allergic/Immunologic: Negative.    Neurological: Negative.    Hematological: Negative.    Psychiatric/Behavioral: Negative.           Past Medical History:   Diagnosis Date    Abdominal adhesions     Alcoholism (Formerly [facility]) 1996    in remission    Anesthesia complication     difficult to wake up    Anxiety     Arthritis     Asthma     with exertion    Cancer (Formerly [facility])     melanoma    Colon polyp     Crohn's disease (Former[facility])     Depression     Depression     Disease of thyroid gland     Fibromyalgia     Fibromyalgia, primary     Fractures 2006    GERD (gastroesophageal reflux disease)     History of cholecystectomy 09/07/2019    Hyperlipidemia     Infected tooth 01/2024    Tooth #14-was treated with antibiotics-endodontal appt 2/1 may need root canal    Irregular heart beat     can be tachy; also has orthoststic hypotension    Irritable bowel syndrome 1990    Lazy eye     resolved: 3/27/17    Medical clearance for psychiatric admission 07/13/2021    Melanoma ([facility]) 2010    Mild asthma 11/04/2020    Osteoarthritis 07/2018    Osteopenia     with joint pain-elevated DEV    Polymyalgia ([facility])     Psychiatric disorder     Shortness of breath     assoc with tachycardia    Stomach disorder 1995    Tinnitus     Wears glasses     for reading       Past Surgical History:   Procedure Laterality Date    ABDOMINAL SURGERY      lysis of adhesions x 2    APPENDECTOMY      CERVICAL FUSION      May 5,2021 and Jan. 01,2020    CHOLECYSTECTOMY      open    COLONOSCOPY      DILATION AND CURETTAGE OF UTERUS      FOOT SURGERY  05/2017    NECK SURGERY  01/2019 5/2021    NEUROMA EXCISION Right 05/26/2017    Procedure: EXCISION MASS / FIBROMA FOOT;  Surgeon:  Jorden Crespo DPM;  Location: [facility];  Service:     PARS PLANA VITRECTOMY W/ REPAIR OF MACULAR HOLE  12/23/2020    DE ARTHRP KNE CONDYLE&PLATU MEDIAL&LAT COMPARTMENTS Right 2/6/2024    Procedure: ARTHROPLASTY KNEE TOTAL W ROBOT - RIGHT - PRESS FIT - OVERNIGHT;  Surgeon: Jairon Kim DO;  Location: [facility];  Service: Orthopedics    DE XCAPSL CTRC RMVL INSJ IO LENS PROSTH W/O ECP Left 12/06/2021    Procedure: EXTRACTION EXTRACAPSULAR CATARACT PHACO INTRAOCULAR LENS (IOL);  Surgeon: Mandeep Chavez MD;  Location: [facility] MAIN OR;  Service: Ophthalmology    RIGHT OOPHORECTOMY      UPPER GASTROINTESTINAL ENDOSCOPY  5/2019    WISDOM TOOTH EXTRACTION      x4       Family History   Problem Relation Age of Onset    Heart disease Mother     Stroke Mother 62    COPD Mother     Arthritis Mother     Asthma Mother     Hypertension Father     Dislocations Sister     Multiple sclerosis Sister     Neurological problems Sister     Scoliosis Sister     Depression Sister     Mental illness Sister     No Known Problems Maternal Grandmother     No Known Problems Maternal Grandfather     No Known Problems Paternal Grandmother     No Known Problems Paternal Grandfather     Kidney disease Brother         kidney transplant    No Known Problems Maternal Aunt     No Known Problems Maternal Uncle     No Known Problems Paternal Aunt     No Known Problems Paternal Uncle     ADD / ADHD Neg Hx     Anesthesia problems Neg Hx     Cancer Neg Hx     Clotting disorder Neg Hx     Collagen disease Neg Hx     Diabetes Neg Hx     Dislocations Neg Hx     Learning disabilities Neg Hx     Neurological problems Neg Hx     Osteoporosis Neg Hx     Rheumatologic disease Neg Hx     Scoliosis Neg Hx     Vascular Disease Neg Hx        Social History     Occupational History    Not on file   Tobacco Use    Smoking status: Never     Passive exposure: Never    Smokeless tobacco: Never   Vaping Use    Vaping status: Never Used   Substance and Sexual Activity     Alcohol use: Not Currently    Drug use: No    Sexual activity: Not Currently     Partners: Male         Current Outpatient Medications:     acetaminophen (TYLENOL) 325 mg tablet, Take 3 tablets (975 mg total) by mouth every 8 (eight) hours, Disp: , Rfl:     albuterol (ProAir HFA) 90 mcg/act inhaler, Inhale 2 puffs every 6 (six) hours as needed for wheezing, Disp: 8.5 g, Rfl: 0    aspirin 325 mg tablet, Take 1 tablet (325 mg total) by mouth 2 (two) times a day, Disp: 82 tablet, Rfl: 0    Calcium Carb-Cholecalciferol 600-12.5 MG-MCG CAPS, Take by mouth daily in the early morning, Disp: , Rfl:     docusate sodium (COLACE) 100 mg capsule, Take 1 capsule (100 mg total) by mouth 2 (two) times a day, Disp: 60 capsule, Rfl: 0    DULoxetine (CYMBALTA) 20 mg capsule, Take 1 capsule (20 mg total) by mouth daily after dinner, Disp: 30 capsule, Rfl: 3    DULoxetine (CYMBALTA) 60 mg delayed release capsule, TAKE 1 CAPSULE BY MOUTH EVERY DAY, Disp: 90 capsule, Rfl: 1    esomeprazole (NexIUM) 40 MG capsule, TAKE 1 CAPSULE BY MOUTH EVERY DAY BEFORE BREAKFAST, Disp: 90 capsule, Rfl: 1    gabapentin (NEURONTIN) 300 mg capsule, Take 1 capsule (300 mg total) by mouth 2 (two) times a day, Disp: 60 capsule, Rfl: 5    levothyroxine 50 mcg tablet, TAKE 1 TABLET BY MOUTH EVERY DAY, Disp: 90 tablet, Rfl: 1    mesalamine (LIALDA) 1.2 g EC tablet, Take 4 tablets (4.8 g total) by mouth daily with breakfast, Disp: 120 tablet, Rfl: 2    naloxone (NARCAN) 4 mg/0.1 mL nasal spray, Administer 1 spray into a nostril. If no response after 2-3 minutes, give another dose in the other nostril using a new spray., Disp: 1 each, Rfl: 0    oxyCODONE (Roxicodone) 5 immediate release tablet, Take 1-2 tablets by mouth every 6 hours as needed for pain, Disp: 40 tablet, Rfl: 0    QUEtiapine (SEROquel) 100 mg tablet, TAKE 1 TABLET BY MOUTH DAILY AT BEDTIME, Disp: 90 tablet, Rfl: 1    Sodium Fluoride 5000 PPM 1.1 % PSTE, USE ONCE DAILY PREFERABLY AT NIGHT, Disp: ,  Rfl:     Magnesium 400 MG TABS, Take 800 mg by mouth daily (Patient not taking: Reported on 2/21/2024), Disp: , Rfl:     midodrine (PROAMATINE) 10 MG tablet, Take 1 tablet (10 mg total) by mouth in the morning Do not start before November 18, 2023. (Patient taking differently: Take 5 mg by mouth in the morning), Disp: 30 tablet, Rfl: 0    Allergies   Allergen Reactions    Meperidine Lightheadedness and Other (See Comments)    Sulfa Antibiotics Hives       Objective:  Vitals:    02/21/24 1335   BP: 112/76   Pulse: 101       Body mass index is 28.29 kg/m².    Right Knee Exam     Muscle Strength   The patient has normal right knee strength.    Tenderness   The patient is experiencing tenderness in the medial retinaculum and lateral retinaculum.    Range of Motion   Extension:  0 normal   Flexion:  110 abnormal     Tests   Varus: negative Valgus: negative  Drawer:  Anterior - negative      Patellar apprehension: negative    Other   Erythema: absent  Scars: present  Sensation: normal  Pulse: present  Swelling: none  Effusion: no effusion present    Comments:  Anterior incision healing well without erythema, warmth, drainage, or dehiscence.  No sign of infection  Prosthesis stable to varus valgus stressing at 0, 30, 90 degrees  Patella tracks midline and flat without crepitance  Thigh calf soft and nontender  Ambulates slowly with an antalgic gait with use of a walker  Grossly distally neurovascularly unchanged          Observations     Right Knee   Negative for effusion.       Physical Exam  Vitals and nursing note reviewed.   Constitutional:       Appearance: Normal appearance. She is well-developed.      Comments: Body mass index is 28.29 kg/m².   HENT:      Head: Normocephalic and atraumatic.      Right Ear: External ear normal.      Left Ear: External ear normal.   Eyes:      Extraocular Movements: Extraocular movements intact.      Conjunctiva/sclera: Conjunctivae normal.   Cardiovascular:      Rate and Rhythm:  Normal rate.      Pulses: Normal pulses.   Pulmonary:      Effort: Pulmonary effort is normal.   Musculoskeletal:      Cervical back: Normal range of motion.      Right knee: No effusion.      Comments: See ortho exam   Skin:     General: Skin is warm and dry.   Neurological:      General: No focal deficit present.      Mental Status: She is alert and oriented to person, place, and time. Mental status is at baseline.   Psychiatric:         Mood and Affect: Mood normal.         Behavior: Behavior normal.         Thought Content: Thought content normal.         Judgment: Judgment normal.       I have personally reviewed pertinent films in PACS of x-rays taken today of her right knee compared them to previous postoperative films.  There is been no change in alignment of the press-fit total knee prosthesis that is well aligned.  There is no signs of loosening, periprosthetic fracture, or other interval complication    This document was created using speech voice recognition software.   Grammatical errors, random word insertions, pronoun errors, and incomplete sentences are an occasional consequence of this system due to software limitations, ambient noise, and hardware issues.   Any formal questions or concerns about content, text, or information contained within the body of this dictation should be directly addressed to the provider for clarification.

## 2024-02-23 ENCOUNTER — OFFICE VISIT (OUTPATIENT)
Dept: PHYSICAL THERAPY | Facility: CLINIC | Age: 69
End: 2024-02-23
Payer: COMMERCIAL

## 2024-02-23 DIAGNOSIS — M17.11 PRIMARY OSTEOARTHRITIS OF RIGHT KNEE: ICD-10-CM

## 2024-02-23 DIAGNOSIS — G89.29 CHRONIC PAIN OF RIGHT KNEE: Primary | ICD-10-CM

## 2024-02-23 DIAGNOSIS — M25.561 CHRONIC PAIN OF RIGHT KNEE: Primary | ICD-10-CM

## 2024-02-23 PROCEDURE — 97112 NEUROMUSCULAR REEDUCATION: CPT | Performed by: PHYSICAL THERAPIST

## 2024-02-23 PROCEDURE — 97110 THERAPEUTIC EXERCISES: CPT | Performed by: PHYSICAL THERAPIST

## 2024-02-23 NOTE — PROGRESS NOTES
"        Daily Note         Today's date: 2024  Patient name: Ina Tolbert  : 1955  MRN: [id number]  Referring provider: Jairon Kim DO  Dx:   Encounter Diagnosis     ICD-10-CM    1. Chronic pain of right knee  M25.561     G89.29       2. Primary osteoarthritis of right knee  M17.11           Subjective: She has a little more soreness today.  Pain is 4/10 or 5.10.       Objective: See treatment diary below      Assessment:  She completed squats and 4 inch step up this session.  She had no deviations with the low step up height.  She would benefit from continued progression in reps and resistance to develop strength.      Plan: Continue per plan of care.         Eval/ Re-eval Auth #/ Referral # Total visits Start date  Expiration date Total active units  Total manual units  PT only or  PT+OT?   24       Total units authorized  1/12 2/12 3/12 4/12 5/12 6/12 7/12 8/12  MORE AUTH     Total visits remaining  11 10 9 8 7 6 5 4           Precautions:  Asthma      Specialty Daily Treatment Diary     Manuals 24   Visit # 4 5 6-foto 7 8   PF mobs   2 min Performed  2 min   Stretch HS Quad STM Quad 5' 3 min 3 min performed 3 min   Knee PROM     3 min           Flex AROM 89 deg 92 deg 99 deg 99  105 deg   Ext AROM -3 deg -2 deg -1 deg -1 -1           Warm-up        NuStep 8' L1 6 min 6 min NA 5 min   Neuro Re-Ed        Qset 20x5\" 20 20 20 x  30   SAQ 2x10 20 20 -- 30   SLR 2x5 2x5 AA 2x5 AA 10 x  2 AAROM 2x10 AA   Ankle pumps 30x -- --  --   HR 2x10 20 -- 10 x 3 --           Ther Ex        Knee PROM 5' 5 min 5 min performed    Mini squat     10   Side step 5 laps at the hi low table 5 laps at table 5 laps At bar: 5 laps, 16 feet x 2  --   Knee flex Standing knee flexion 20 20 20 x  20   LAQ 20x *pain 20 20 20 x  20   Heel slides 2x10 with SOS and sliding board 20 on pnut 30 W/ peanut: 20x  30  " "pnut   Hip Abd stand ABD 2x10 b/l  EXT 2x10  b/l --   --   Leg press  45#  2x10 55#   3x10 55 lb 10 x 3  3x10  65#   Step ups     15  4\"                   Ther Activity                        Gait Training        W/ rw                Modalities        CP Deferred - to do at home   5 min ant and post knee.                              "

## 2024-02-27 ENCOUNTER — OFFICE VISIT (OUTPATIENT)
Dept: PHYSICAL THERAPY | Facility: CLINIC | Age: 69
End: 2024-02-27
Payer: COMMERCIAL

## 2024-02-27 ENCOUNTER — TELEMEDICINE (OUTPATIENT)
Dept: PSYCHIATRY | Facility: CLINIC | Age: 69
End: 2024-02-27
Payer: COMMERCIAL

## 2024-02-27 VITALS — HEIGHT: 65 IN | BODY MASS INDEX: 28.29 KG/M2

## 2024-02-27 DIAGNOSIS — F33.42 RECURRENT MAJOR DEPRESSIVE DISORDER, IN FULL REMISSION (HCC): ICD-10-CM

## 2024-02-27 DIAGNOSIS — M25.561 CHRONIC PAIN OF RIGHT KNEE: Primary | ICD-10-CM

## 2024-02-27 DIAGNOSIS — G89.29 CHRONIC PAIN OF RIGHT KNEE: Primary | ICD-10-CM

## 2024-02-27 DIAGNOSIS — F51.05 INSOMNIA RELATED TO ANOTHER MENTAL DISORDER: ICD-10-CM

## 2024-02-27 DIAGNOSIS — F33.41 MAJOR DEPRESSIVE DISORDER, RECURRENT, IN PARTIAL REMISSION (HCC): ICD-10-CM

## 2024-02-27 DIAGNOSIS — F41.1 GENERALIZED ANXIETY DISORDER: ICD-10-CM

## 2024-02-27 DIAGNOSIS — F41.9 ANXIETY: ICD-10-CM

## 2024-02-27 DIAGNOSIS — F51.01 PRIMARY INSOMNIA: ICD-10-CM

## 2024-02-27 DIAGNOSIS — M17.11 PRIMARY OSTEOARTHRITIS OF RIGHT KNEE: ICD-10-CM

## 2024-02-27 DIAGNOSIS — F33.2 SEVERE EPISODE OF RECURRENT MAJOR DEPRESSIVE DISORDER, WITHOUT PSYCHOTIC FEATURES (HCC): ICD-10-CM

## 2024-02-27 DIAGNOSIS — F43.10 PTSD (POST-TRAUMATIC STRESS DISORDER): Primary | Chronic | ICD-10-CM

## 2024-02-27 PROCEDURE — 97110 THERAPEUTIC EXERCISES: CPT | Performed by: PHYSICAL THERAPIST

## 2024-02-27 PROCEDURE — 97112 NEUROMUSCULAR REEDUCATION: CPT | Performed by: PHYSICAL THERAPIST

## 2024-02-27 PROCEDURE — 90833 PSYTX W PT W E/M 30 MIN: CPT | Performed by: NURSE PRACTITIONER

## 2024-02-27 PROCEDURE — 99214 OFFICE O/P EST MOD 30 MIN: CPT | Performed by: NURSE PRACTITIONER

## 2024-02-27 RX ORDER — QUETIAPINE FUMARATE 100 MG/1
100 TABLET, FILM COATED ORAL
Qty: 90 TABLET | Refills: 1 | Status: SHIPPED | OUTPATIENT
Start: 2024-02-27

## 2024-02-27 RX ORDER — DULOXETIN HYDROCHLORIDE 20 MG/1
20 CAPSULE, DELAYED RELEASE ORAL
Qty: 90 CAPSULE | Refills: 0 | Status: SHIPPED | OUTPATIENT
Start: 2024-02-27

## 2024-02-27 RX ORDER — DULOXETIN HYDROCHLORIDE 60 MG/1
60 CAPSULE, DELAYED RELEASE ORAL DAILY
Qty: 90 CAPSULE | Refills: 1 | Status: SHIPPED | OUTPATIENT
Start: 2024-02-27

## 2024-02-27 NOTE — PROGRESS NOTES
Daily Note         Today's date: 2024  Patient name: Ina Tolbert  : 1955  MRN: [id number]  Referring provider: Jairon Kim DO  Dx:   Encounter Diagnosis     ICD-10-CM    1. Chronic pain of right knee  M25.561     G89.29       2. Primary osteoarthritis of right knee  M17.11           Subjective: She reports 4/10 pain       Objective: See treatment diary below      Assessment:  She completed squats and 4 inch step up this session.  She continues to improve flexion ROM regularly.  She tolerated an increase in step height to 6 inch but fatigued after 10 reps.  She performed 10 more at a lower height.      Plan: Continue per plan of care.   Re-Eval        Eval/ Re-eval Auth #/ Referral # Total visits Start date  Expiration date Total active units  Total manual units  PT only or  PT+OT?   24      Total units authorized  1/12 2/12 3/12 4/12 5/12 6/12 7/12 8/12 9/12 MORE AUTH     Total visits remaining  11 10 9 8 7 6 5 4 3          Precautions:  Asthma      Specialty Daily Treatment Diary     Manuals 24   Visit # 5 6-foto 7 8 9   PF mobs  2 min Performed  2 min 2 min   Stretch HS Quad 3 min 3 min performed 3 min 3 min   Knee PROM    3 min 3 min           Flex AROM 92 deg 99 deg 99  105 deg 108 deg   Ext AROM -2 deg -1 deg -1 -1 0 deg           Warm-up        NuStep 6 min 6 min NA 5 min 5 min  Lev 2   Neuro Re-Ed        Qset 20 20 20 x  30 30   SAQ 20 20 -- 30 30   SLR 2x5 AA 2x5 AA 10 x  2 AAROM 2x10 AA 2x10  no AA   Ankle pumps -- --  -- --   HR 20 -- 10 x 3 -- --           Ther Ex        Knee PROM 5 min 5 min performed     Mini squat    10 20   Side step 5 laps at table 5 laps At bar: 5 laps, 16 feet x 2  -- 5 laps   Knee flex 20 20 20 x  20 20   LAQ 20 20 20 x  20 20   Heel slides 20 on pnut 30 W/ peanut: 20x  30  pnut 30   Hip Abd stand --   -- --   Leg press 45#  2x10 55#   " 3x10 55 lb 10 x 3  3x10  65# 3x10  65#   Step ups    15  4\" 10  6\"  10  4\"                   Ther Activity                        Gait Training        W/ rw                Modalities        CP   5 min ant and post knee.                               "

## 2024-03-01 ENCOUNTER — EVALUATION (OUTPATIENT)
Dept: PHYSICAL THERAPY | Facility: CLINIC | Age: 69
End: 2024-03-01
Payer: COMMERCIAL

## 2024-03-01 DIAGNOSIS — M25.561 CHRONIC PAIN OF RIGHT KNEE: Primary | ICD-10-CM

## 2024-03-01 DIAGNOSIS — M17.11 PRIMARY OSTEOARTHRITIS OF RIGHT KNEE: ICD-10-CM

## 2024-03-01 DIAGNOSIS — Z96.651 S/P TOTAL KNEE REPLACEMENT NOT USING CEMENT, RIGHT: ICD-10-CM

## 2024-03-01 DIAGNOSIS — G89.29 CHRONIC PAIN OF RIGHT KNEE: Primary | ICD-10-CM

## 2024-03-01 PROCEDURE — 97110 THERAPEUTIC EXERCISES: CPT | Performed by: PHYSICAL THERAPIST

## 2024-03-01 RX ORDER — OXYCODONE HYDROCHLORIDE 5 MG/1
TABLET ORAL
Qty: 40 TABLET | Refills: 0 | Status: CANCELLED | OUTPATIENT
Start: 2024-03-01

## 2024-03-01 RX ORDER — OXYCODONE HYDROCHLORIDE 5 MG/1
5 TABLET ORAL EVERY 6 HOURS PRN
Qty: 30 TABLET | Refills: 0 | Status: SHIPPED | OUTPATIENT
Start: 2024-03-01

## 2024-03-01 NOTE — PROGRESS NOTES
PT Re-Evaluation     Today's date: 3/1/2024  Patient name: Ina Tolbert  : 1955  MRN: [id number]  Referring provider: Jairon Kim DO  Dx:   Encounter Diagnosis     ICD-10-CM    1. Chronic pain of right knee  M25.561     G89.29       2. Primary osteoarthritis of right knee  M17.11                      Assessment  Assessment details: Ina Tolbert has remaining pain, decreased LE range of motion, decreased LE strength, fair balance, impaired function and decreased activity tolerance. Patient's clinical presentation is consistent with their referring diagnosis of Primary osteoarthritis of right knee and Chronic pain of right knee. The pt presents with functional limitations of ADLs, recreational activities, ambulation, performing household chores and stair negotiation. Pt would benefit from continued physical therapy services to address these limitations and maximize function.     Impairments: abnormal gait, abnormal muscle firing, abnormal muscle tone, abnormal or restricted ROM, abnormal movement, activity intolerance, impaired balance, impaired physical strength, lacks appropriate home exercise program, pain with function, weight-bearing intolerance, poor posture  and poor body mechanics  Understanding of Dx/Px/POC: good   Prognosis: good    Goals  Short term goals  (6 weeks)  1.  Patient will have no pain right knee  -- PARTIALLY MET  2 . Patient will have full range of motion right knee  -- PARTIALLY MET  3.  Patient will report a 50% improvement with activities of daily living -- MET  4.  Patient will decrease girth of right knee by 1 to 2 cm. -- MET    Long term goals - (12 weeks)  1.  Patient will be independent with home exercise program  -- PARTIALLY MET  2.  Patient will have no gait deviations ambulating on level surfaces. -- NOT MET  3.  Patient will report 80 % improvement with activity of daily living function.   -- PARTIALLY MET  4.  Patient will negotiate stairs up and down pain free with  use of one railing.  -- NOT MET      Plan  Patient would benefit from: skilled physical therapy  Planned modality interventions: cryotherapy  Planned therapy interventions: ADL training, balance/weight bearing training, joint mobilization, manual therapy, massage, neuromuscular re-education, patient education, postural training, strengthening, stretching, functional ROM exercises, therapeutic activities, therapeutic exercise, gait training and home exercise program  Frequency: 2x week  Duration in weeks: 8  Treatment plan discussed with: patient        Subjective Evaluation    History of Present Illness  Mechanism of injury: Re-Eval 3/1/24 - - She reports overall improvement.  She still has difficulty with sit to stand after 45 minutes of sitting.  She feels unsteady when she stands up following that period of prolonged sitting.  She also notes difficulty with stair negotiation.  Patient Goals  Patient goals for therapy: independence with ADLs/IADLs  Patient's goals regarding treatment: walk normally for 1/2 mile.  Pain  Current pain ratin  At best pain ratin  At worst pain ratin  Location: Right knee  Quality: dull ache  Relieving factors: medications, ice, change in position and rest  Aggravating factors: walking, standing and stair climbing (driving)    Exercise history: Housework        Objective     Palpation     Right   Hypertonic in the distal biceps femoris, distal semimembranosus and distal semitendinosus.     Active Range of Motion   Left Knee   Flexion: 131 degrees   Extension: 0 degrees     Right Knee   Flexion: 108 degrees   Extension: -2 degrees     Mobility   Patellar Mobility:     Right Knee   Hypomobile: medial, lateral, superior and inferior     Strength/Myotome Testing     Left Knee   Flexion: 4+  Extension: 4+    Right Knee   Flexion: 4-  Extension: 4-    Swelling     Left Knee Girth Measurement (cm)   Joint line: 27.1 cm    Right Knee Girth Measurement (cm)   Joint line: 30.9  "cm    Ambulation   Weight-Bearing Status   Assistive device used: single point cane    Observational Gait   Gait: antalgic   Decreased right stance time.                        Eval/ Re-eval Auth #/ Referral # Total visits Start date  Expiration date Total active units  Total manual units  PT only or  PT+OT?   1/29/24                                                                1/29 2/9 2/12 2/14 2/16 2/19 2/21 2/23 2/27  3/1       Total units authorized  1/12 2/12 3/12 4/12 5/12 6/12 7/12 8/12 9/12 MORE AUTH       Total visits remaining  11 10 9 8 7 6 5 4 3  2             Precautions:  Asthma        Specialty Daily Treatment Diary      Manuals 2/19/24 2/21/24 2/23/24 2/27/24 3/1/24   Visit # 6-foto 7 8 9 10   PF mobs 2 min Performed  2 min 2 min    Stretch HS Quad 3 min performed 3 min 3 min    Knee PROM     3 min 3 min                 Flex AROM 99 deg 99  105 deg 108 deg 108 deg   Ext AROM -1 deg -1 -1 0 deg 0 deg                Warm-up            NuStep 6 min NA 5 min 5 min  Lev 2 5 min  L2   Neuro Re-Ed            Qset 20 20 x  30 30 30   SAQ 20 -- 30 30    SLR 2x5 AA 10 x  2 AAROM 2x10 AA 2x10  no AA 1x10   HR -- 10 x 3 -- --                 Ther Ex            Knee PROM 5 min performed        Mini squat     10 20 20   Side step 5 laps At bar: 5 laps, 16 feet x 2  -- 5 laps 5 laps   Knee flex 20 20 x  20 20 20   LAQ 20 20 x  20 20 30   Heel slides 30 W/ peanut: 20x  30  pnut 30 30   Hip Abd stand     -- -- --   Leg press 55#   3x10 55 lb 10 x 3  3x10  65# 3x10  65# 3x10   65#   Step ups     15  4\" 10  6\"  10  4\" 20  6\"                             Ther Activity                                      Gait Training            W/ rw                         Modalities            CP   5 min ant and post knee.                                   "

## 2024-03-01 NOTE — TELEPHONE ENCOUNTER
Reason for call:   [x] Refill   [] Prior Auth  [] Other:     Office:   [] PCP/Provider -   [x] Specialty/Provider - ortho/ Blaze Botello PA-C     Medication: oxyCODONE (Roxicodone) 5 immediate release tablet     Dose/Frequency: Take 1-2 tablets by mouth every 6 hours as needed for pain     Quantity: 40    Pharmacy: [facility]/pharm[facility] #[id number] [address]     Does the patient have enough for 3 days?   [] Yes   [x] No - Send as HP to POD    Intact

## 2024-03-05 ENCOUNTER — OFFICE VISIT (OUTPATIENT)
Dept: PHYSICAL THERAPY | Facility: CLINIC | Age: 69
End: 2024-03-05
Payer: COMMERCIAL

## 2024-03-05 DIAGNOSIS — M17.11 PRIMARY OSTEOARTHRITIS OF RIGHT KNEE: ICD-10-CM

## 2024-03-05 DIAGNOSIS — G89.29 CHRONIC PAIN OF RIGHT KNEE: Primary | ICD-10-CM

## 2024-03-05 DIAGNOSIS — M25.561 CHRONIC PAIN OF RIGHT KNEE: Primary | ICD-10-CM

## 2024-03-05 PROCEDURE — 97110 THERAPEUTIC EXERCISES: CPT

## 2024-03-05 NOTE — PROGRESS NOTES
Daily Note     Today's date: 3/5/2024  Patient name: Ina Tolbret  : 1955  MRN: [id number]  Referring provider: Jairon Kim DO  Dx:   Encounter Diagnosis     ICD-10-CM    1. Chronic pain of right knee  M25.561     G89.29       2. Primary osteoarthritis of right knee  M17.11                      Subjective: Pain level is 4/10 today.  She also reports stiffness. Notes recent shortness of breath, had some trouble getting from shower back into bedroom. This is more recent, has seen pulmonologist in the past but not recently.       Objective: AROM 0-114 today (post PROM) - requires cueing for quad set prior to SLR to promote proper form.      Assessment: Tolerated treatment well. Patient would benefit from continued PT. Does well w/ exercise progressions. Will benefit from more aggressive strength challenges in future sessions as ROM becomes more normalized. Loaded single leg work will help to promote greater symmetry between limbs. Increase intensity per primary PT.       Plan: Continue per plan of care.  Per primary PT           Eval/ Re-eval Auth #/ Referral # Total visits Start date  Expiration date Total active units  Total manual units  PT only or  PT+OT?   1/29/24                                                                1/29 2/9 2/12 2/14 2/16 2/19 2/21 2/23 2/27  3/1  3/5     Total units authorized  1/12 2/12 3/12 4/12 5/12 6/12 7/12 8/12 9/12 MORE AUTH       Total visits remaining  11 10 9 8 7 6 5 4 3  2  1           Precautions:  Asthma        Specialty Daily Treatment Diary      Manuals 2/21/24 2/23/24 2/27/24 3/1/24 3/5/24   Visit # 7 8 9 10 11   PF mobs Performed  2 min 2 min     Stretch HS Quad performed 3 min 3 min     Knee PROM   3 min 3 min  CP x 6 mins                Flex AROM 99  105 deg 108 deg 108 deg 114 deg   Ext AROM -1 -1 0 deg 0 deg 0 deg               Warm-up           NuStep NA 5 min 5 min  Lev 2 5 min  L2 6 min   Neuro Re-Ed           Qset 20 x  30 30 30 30   SAQ -- 30  "30     SLR 10 x  2 AAROM 2x10 AA 2x10  no AA 1x10 3x10 SLR and s/l hip abd   HR 10 x 3 -- --              Clams 2x10   Ther Ex           Knee PROM performed         Mini squat   10 20 20 2x10   Side step At bar: 5 laps, 16 feet x 2  -- 5 laps 5 laps 5 rounds   Knee flex 20 x  20 20 20 20   LAQ 20 x  20 20 30 30   Heel slides W/ peanut: 20x  30  pnut 30 30    Hip Abd stand   -- -- -- ---   Leg press 55 lb 10 x 3  3x10  65# 3x10  65# 3x10   65# 3x15  75#   Step ups   15  4\" 10  6\"  10  4\" 20  6\" 2x10  6\"                           Ther Activity                                   Gait Training           W/ rw                       Modalities           CP 5 min ant and post knee.                                   "

## 2024-03-05 NOTE — PSYCH
Virtual Regular Visit    Problem List Items Addressed This Visit          Other    PTSD (post-traumatic stress disorder) - Primary (Chronic)    Relevant Medications    DULoxetine (CYMBALTA) 20 mg capsule    DULoxetine (CYMBALTA) 60 mg delayed release capsule    QUEtiapine (SEROquel) 100 mg tablet    Anxiety    Relevant Medications    DULoxetine (CYMBALTA) 20 mg capsule    Generalized anxiety disorder    Relevant Medications    DULoxetine (CYMBALTA) 20 mg capsule    DULoxetine (CYMBALTA) 60 mg delayed release capsule    QUEtiapine (SEROquel) 100 mg tablet    Insomnia related to another mental disorder    Relevant Medications    DULoxetine (CYMBALTA) 20 mg capsule    DULoxetine (CYMBALTA) 60 mg delayed release capsule    QUEtiapine (SEROquel) 100 mg tablet    Major depressive disorder, recurrent, in partial remission (HCC)    Relevant Medications    DULoxetine (CYMBALTA) 20 mg capsule    DULoxetine (CYMBALTA) 60 mg delayed release capsule    QUEtiapine (SEROquel) 100 mg tablet    Primary insomnia    Recurrent major depressive disorder, in full remission (HCC)    Relevant Medications    DULoxetine (CYMBALTA) 20 mg capsule    DULoxetine (CYMBALTA) 60 mg delayed release capsule    QUEtiapine (SEROquel) 100 mg tablet    Severe episode of recurrent major depressive disorder, without psychotic features (HCC)    Relevant Medications    DULoxetine (CYMBALTA) 20 mg capsule    DULoxetine (CYMBALTA) 60 mg delayed release capsule    QUEtiapine (SEROquel) 100 mg tablet     Reason for visit is   Chief Complaint   Patient presents with    Anxiety    Depression    Medication Management    PTSD           Insomnia            Encounter provider Callie Guidry, PhD    Provider located at [facility] [address]  [facility] [address] [phone]    Recent Visits  Date Type Provider Dept   02/27/24 Telemedicine Callie Guidry, PhD  [facility]  [place]   Showing recent visits within past 7 days and meeting all other requirements  Future Appointments  No visits were found meeting these conditions.  Showing future appointments within next 150 days and meeting all other requirements       After connecting through [employer], the patient was identified by name and date of birth. Ina Tolbert was informed that this is a telemedicine visit and that the visit is being conducted through the Epic Embedded platform. She agrees to proceed. which may not be secure and therefore, might not be HIPAA-compliant.  My office door was closed. No one else was in the room.  She acknowledged consent and understanding of privacy and security of the video platform. The patient has agreed to participate and understands they can discontinue the visit at any time.    SUBJECTIVE:    Ina Tolbert is a 68 y.o. female with a history of PTSD, insomnia, anxiety, depression, seen for medication management and mood assessment.  Ina reports having a knee replacement and she is slowly recovering she reports 3 falls at home after the surgery; however, she denied injury.  Reports her appetite is good and she is sleeping.  After she recovers she may be working 20 hours at home which she is happy about she reports wanting to go away April 30 May seventh.  She is looking forward to this time off.  Denies depression and suicidal ideation.  ELLY-7 score of 9 indicates mild anxiety, and a PHQ-9 score of 9 indicates mild depression.  Takes medication as prescribed, and feels they are effective in controlling her mood.  Family are supportive.    HPI ROS Appetite Changes and Sleep: normal appetite and decreased energy    Review Of Systems:     Mood Anxiety   Behavior Normal    Thought Content Normal   General Emotional Problems and Sleep Disturbances   Personality Normal   Other Psych Symptoms Normal   Constitutional As Noted in HPI   ENT As Noted in HPI   Cardiovascular As Noted in HPI    Respiratory As Noted in HPI   Gastrointestinal As Noted in HPI   Genitourinary As Noted in HPI   Musculoskeletal As Noted in HPI   Integumentary As Noted in HPI   Neurological As Noted in HPI   Endocrine hypothyroid   Other Symptoms Normal        Substance Abuse History:    Social History     Substance and Sexual Activity   Drug Use No       Family Psychiatric History:     Family History   Problem Relation Age of Onset    Heart disease Mother     Stroke Mother 62    COPD Mother     Arthritis Mother     Asthma Mother     Hypertension Father     Dislocations Sister     Multiple sclerosis Sister     Neurological problems Sister     Scoliosis Sister     Depression Sister     Mental illness Sister     No Known Problems Maternal Grandmother     No Known Problems Maternal Grandfather     No Known Problems Paternal Grandmother     No Known Problems Paternal Grandfather     Kidney disease Brother         kidney transplant    No Known Problems Maternal Aunt     No Known Problems Maternal Uncle     No Known Problems Paternal Aunt     No Known Problems Paternal Uncle     ADD / ADHD Neg Hx     Anesthesia problems Neg Hx     Cancer Neg Hx     Clotting disorder Neg Hx     Collagen disease Neg Hx     Diabetes Neg Hx     Dislocations Neg Hx     Learning disabilities Neg Hx     Neurological problems Neg Hx     Osteoporosis Neg Hx     Rheumatologic disease Neg Hx     Scoliosis Neg Hx     Vascular Disease Neg Hx        Social History     Socioeconomic History    Marital status: /Civil Union     Spouse name: Not on file    Number of children: Not on file    Years of education: Not on file    Highest education level: Not on file   Occupational History    Not on file   Tobacco Use    Smoking status: Never     Passive exposure: Never    Smokeless tobacco: Never   Vaping Use    Vaping status: Never Used   Substance and Sexual Activity    Alcohol use: Not Currently    Drug use: No    Sexual activity: Not Currently     Partners: Male    Other Topics Concern    Not on file   Social History Narrative    Not on file     Social Determinants of Health     Financial Resource Strain: Not on file   Food Insecurity: No Food Insecurity (11/14/2023)    Hunger Vital Sign     Worried About Running Out of Food in the Last Year: Never true     Ran Out of Food in the Last Year: Never true   Transportation Needs: No Transportation Needs (11/14/2023)    PRAPARE - Transportation     Lack of Transportation (Medical): No     Lack of Transportation (Non-Medical): No   Physical Activity: Not on file   Stress: Not on file   Social Connections: Not on file   Intimate Partner Violence: Not on file   Housing Stability: Low Risk  (11/14/2023)    Housing Stability Vital Sign     Unable to Pay for Housing in the Last Year: No     Number of Places Lived in the Last Year: 1     Unstable Housing in the Last Year: No       Past Medical History:   Diagnosis Date    Abdominal adhesions     Alcoholism (HCC) 1996    in remission    Anesthesia complication     difficult to wake up    Anxiety     Arthritis     Asthma     with exertion    Cancer (HCC)     melanoma    Colon polyp     Crohn's disease (HCC)     Depression     Depression     Disease of thyroid gland     Fibromyalgia     Fibromyalgia, primary     Fractures 2006    GERD (gastroesophageal reflux disease)     History of cholecystectomy 09/07/2019    Hyperlipidemia     Infected tooth 01/2024    Tooth #14-was treated with antibiotics-endodontal appt 2/1 may need root canal    Irregular heart beat     can be tachy; also has orthoststic hypotension    Irritable bowel syndrome 1990    Lazy eye     resolved: 3/27/17    Medical clearance for psychiatric admission 07/13/2021    Melanoma (HCC) 2010    Mild asthma 11/04/2020    Osteoarthritis 07/2018    Osteopenia     with joint pain-elevated DEV    Polymyalgia (HCC)     Psychiatric disorder     Shortness of breath     assoc with tachycardia    Stomach disorder 1995    Tinnitus     Wears  glasses     for reading       Past Surgical History:   Procedure Laterality Date    ABDOMINAL SURGERY      lysis of adhesions x 2    APPENDECTOMY      CERVICAL FUSION      May 5,2021 and Jan. 01,2020    CHOLECYSTECTOMY      open    COLONOSCOPY      DILATION AND CURETTAGE OF UTERUS      FOOT SURGERY  05/2017    NECK SURGERY  01/2019 5/2021    NEUROMA EXCISION Right 05/26/2017    Procedure: EXCISION MASS / FIBROMA FOOT;  Surgeon: Jorden Crespo DPM;  Location: [facility];  Service:     PARS PLANA VITRECTOMY W/ REPAIR OF MACULAR HOLE  12/23/2020    MI ARTHRP KNE CONDYLE&PLATU MEDIAL&LAT COMPARTMENTS Right 2/6/2024    Procedure: ARTHROPLASTY KNEE TOTAL W ROBOT - RIGHT - PRESS FIT - OVERNIGHT;  Surgeon: Jairon Kim DO;  Location: [facility];  Service: Orthopedics    MI XCAPSL CTRC RMVL INSJ IO LENS PROSTH W/O ECP Left 12/06/2021    Procedure: EXTRACTION EXTRACAPSULAR CATARACT PHACO INTRAOCULAR LENS (IOL);  Surgeon: Mandeep Chavez MD;  Location: [facility] MAIN OR;  Service: Ophthalmology    RIGHT OOPHORECTOMY      UPPER GASTROINTESTINAL ENDOSCOPY  5/2019    WISDOM TOOTH EXTRACTION      x4       Current Outpatient Medications   Medication Sig Dispense Refill    DULoxetine (CYMBALTA) 20 mg capsule Take 1 capsule (20 mg total) by mouth daily after dinner 90 capsule 0    DULoxetine (CYMBALTA) 60 mg delayed release capsule Take 1 capsule (60 mg total) by mouth daily 90 capsule 1    QUEtiapine (SEROquel) 100 mg tablet Take 1 tablet (100 mg total) by mouth daily at bedtime 90 tablet 1    acetaminophen (TYLENOL) 325 mg tablet Take 3 tablets (975 mg total) by mouth every 8 (eight) hours      albuterol (ProAir HFA) 90 mcg/act inhaler Inhale 2 puffs every 6 (six) hours as needed for wheezing 8.5 g 0    aspirin 325 mg tablet Take 1 tablet (325 mg total) by mouth 2 (two) times a day 82 tablet 0    Calcium Carb-Cholecalciferol 600-12.5 MG-MCG CAPS Take by mouth daily in the early morning      docusate sodium (COLACE) 100 mg capsule  Take 1 capsule (100 mg total) by mouth 2 (two) times a day 60 capsule 0    esomeprazole (NexIUM) 40 MG capsule TAKE 1 CAPSULE BY MOUTH EVERY DAY BEFORE BREAKFAST 90 capsule 1    gabapentin (NEURONTIN) 300 mg capsule Take 1 capsule (300 mg total) by mouth 2 (two) times a day 60 capsule 5    levothyroxine 50 mcg tablet TAKE 1 TABLET BY MOUTH EVERY DAY 90 tablet 1    Magnesium 400 MG TABS Take 800 mg by mouth daily (Patient not taking: Reported on 2/21/2024)      mesalamine (LIALDA) 1.2 g EC tablet Take 4 tablets (4.8 g total) by mouth daily with breakfast 120 tablet 2    midodrine (PROAMATINE) 10 MG tablet Take 1 tablet (10 mg total) by mouth in the morning Do not start before November 18, 2023. (Patient taking differently: Take 5 mg by mouth in the morning) 30 tablet 0    naloxone (NARCAN) 4 mg/0.1 mL nasal spray Administer 1 spray into a nostril. If no response after 2-3 minutes, give another dose in the other nostril using a new spray. 1 each 0    oxyCODONE (Roxicodone) 5 immediate release tablet Take 1 tablet (5 mg total) by mouth every 6 (six) hours as needed for moderate pain Max Daily Amount: 20 mg 30 tablet 0    Sodium Fluoride 5000 PPM 1.1 % PSTE USE ONCE DAILY PREFERABLY AT NIGHT       No current facility-administered medications for this visit.        Allergies   Allergen Reactions    Meperidine Lightheadedness and Other (See Comments)    Sulfa Antibiotics Hives       The following portions of the patient's history were reviewed and updated as appropriate: allergies, current medications, past family history, past medical history, past social history, past surgical history, and problem list.    OBJECTIVE:     Mental Status Examination:    Appearance calm and cooperative , adequate hygiene and grooming, and good eye contact    Mood anxious and mood appropriate   Affect affect appropriate    Speech a normal rate   Thought Processes normal thought processes   Hallucinations no hallucinations present    Thought  Content no delusions   Abnormal Thoughts no suicidal thoughts  and no homicidal thoughts    Orientation  oriented to person and place and time   Remote Memory short term memory intact and long term memory intact   Attention Span concentration intact   Intellect Appears to be of Average Intelligence   Insight Insight intact   Judgement judgment was intact   Muscle Strength Muscle strength and tone were normal   Language no difficulty naming common objects   Fund of Knowledge displays adequate knowledge of current events   Pain moderate to severe   Pain Scale 4       Laboratory Results: No results found. However, due to the size of the patient record, not all encounters were searched. Please check Results Review for a complete set of results.    Assessment/Plan:       Diagnoses and all orders for this visit:    PTSD (post-traumatic stress disorder)    Recurrent major depressive disorder, in full remission (HCC)    Primary insomnia    Major depressive disorder, recurrent, in partial remission (HCC)  -     DULoxetine (CYMBALTA) 60 mg delayed release capsule; Take 1 capsule (60 mg total) by mouth daily  -     QUEtiapine (SEROquel) 100 mg tablet; Take 1 tablet (100 mg total) by mouth daily at bedtime    Insomnia related to another mental disorder    Generalized anxiety disorder    Anxiety  -     DULoxetine (CYMBALTA) 20 mg capsule; Take 1 capsule (20 mg total) by mouth daily after dinner    Severe episode of recurrent major depressive disorder, without psychotic features (HCC)          Treatment Recommendations- Risks Benefits      Immediate Medical/Psychiatric/Psychotherapy Treatments and Any Precautions: Continue with treatment plan    Risks, Benefits And Possible Side Effects Of Medications:  {PSYCH RISK, BENEFITS AND POSSIBLE SIDE EFFECTS (Optional):89632    Controlled Medication Discussion: She is aware of safe use and storage of medication    Psychotherapy Provided: 30 minutes  Supportive therapy  Medication  evaluation  Mood assessment  Survey results reviewed and confirmed  Treatment plan updated  Discussed therapy and recommended going to her insurance to find out who is in her network  Safety plan developed  Goals discussed in session: Maintain stable mood       Treatment Plan:    Completed and signed during the session: Yes - Treatment Plan done but not signed at time of office visit due to:  Plan reviewed by video and verbal consent given due to virtual visit. Treatment Plan sent to patient via [employer] for signature.      Callie Guidry, PhD 03/05/24

## 2024-03-05 NOTE — BH CRISIS PLAN
Client Name: Ina Tolbert       Client YOB: 1955    [employer] Safety Plan      Creation Date: 2/27/24 Update Date: 2/27/24   Created By: Callie Guidry, PhD       Step 1: Warning Signs:   Warning Signs   shut down do not talk to anyone about it.            Step 2: Internal Coping Strategies:   Internal Coping Strategies   Journal alittle bit, stop thinking of the negative, phone music and puzzle            Step 3: People and social settings that provide distraction:   Name Contact Information   to a woman's meeting, work, picking a bite to eat.     Places   will be to a meeting           Step 4: People whom I can ask for help during a crisis:      Name Contact Information    not at this moment sponsor went their different ways     sister cell      Step 5: Professionals or agencies I can contact during a crisis:      Clinican/Agency Name Phone Emergency Contact    [facility] [phone] Dr. Guidyr      Local Emergency Department Emergency Department Phone Emergency Department Address    [facility]          Crisis Phone Numbers:   Suicide Prevention Lifeline: Call or Text  326 Crisis Text Line: Text HOME to [phone]   Please note: Some [facility] do not have a separate number for Child/Adolescent specific crisis. If your county is not listed under Child/Adolescent, please call the adult number for your county      Adult Crisis Numbers: Child/Adolescent Crisis Numbers   [facility]: [phone] [facility]: [phone]   [facility]: [phone] [facility]: [phone]   [facility]: [phone] [place]: [phone]   [facility]: [phone] [place]: [phone]   [place]: [phone]   [facility]: [phone]   [facility]: [phone]   [facility]: [phone] (daytime) [phone] (after hours, weekends, holidays)      Step 6: Making the environment safer (plan for lethal means safety):    Patient did not identify any lethal methods: Yes     Optional: What is most important to me and worth living for?   Family mac. Grand daughters.      [employer] Safety Plan. Enid Calhoun and Hector Robbins. Used with permission of the authors.

## 2024-03-06 DIAGNOSIS — K50.80 CROHN'S DISEASE OF BOTH SMALL AND LARGE INTESTINE WITHOUT COMPLICATION (HCC): ICD-10-CM

## 2024-03-06 RX ORDER — MESALAMINE 1.2 G/1
TABLET, DELAYED RELEASE ORAL
Qty: 360 TABLET | Refills: 1 | Status: SHIPPED | OUTPATIENT
Start: 2024-03-06

## 2024-03-08 ENCOUNTER — OFFICE VISIT (OUTPATIENT)
Dept: PHYSICAL THERAPY | Facility: CLINIC | Age: 69
End: 2024-03-08
Payer: COMMERCIAL

## 2024-03-08 DIAGNOSIS — M17.11 PRIMARY OSTEOARTHRITIS OF RIGHT KNEE: ICD-10-CM

## 2024-03-08 DIAGNOSIS — G89.29 CHRONIC PAIN OF RIGHT KNEE: Primary | ICD-10-CM

## 2024-03-08 DIAGNOSIS — M25.561 CHRONIC PAIN OF RIGHT KNEE: Primary | ICD-10-CM

## 2024-03-08 PROCEDURE — 97112 NEUROMUSCULAR REEDUCATION: CPT | Performed by: PHYSICAL THERAPIST

## 2024-03-08 PROCEDURE — 97110 THERAPEUTIC EXERCISES: CPT | Performed by: PHYSICAL THERAPIST

## 2024-03-08 NOTE — PROGRESS NOTES
Daily Note     Today's date: 3/8/2024  Patient name: Ina Tolbert  : 1955  MRN: [id number]  Referring provider: Jairon Kim DO  Dx:   Encounter Diagnosis     ICD-10-CM    1. Chronic pain of right knee  M25.561     G89.29       2. Primary osteoarthritis of right knee  M17.11                      Subjective: Jazmyn notes that she has no pain today and she did not take her pain medication yesterday of today at all.      Objective: AROM 0-114 today (post PROM) - requires cueing for quad set prior to SLR to promote proper form.      Assessment: Tolerated treatment well. Patient would benefit from continued PT.   SLS on faom was challenging for her and caused fatigue.  She needed verbal cues and demonstration to perform correct shift of center of mass in order to balance.      Plan: Continue per plan of care.   Needs more auth           Eval/ Re-eval Auth #/ Referral # Total visits Start date  Expiration date Total active units  Total manual units  PT only or  PT+OT?   1/29/24                                                                1/29 2/9 2/12 2/14 2/16 2/19 2/21 2/23 2/27  3/1  3/5  3   Total units authorized  1/12 2/12 3/12 4/12 5/12 6/12 7/12 8/12 9/12 MORE AUTH     Total visits remaining  11 10 9 8 7 6 5 4 3  2  1  0         Precautions:  Asthma        Specialty Daily Treatment Diary      Manuals 2/23/24 2/27/24 3/1/24 3/5/24 3/8/24   Visit # 8 9 10 11 12   PF mobs 2 min 2 min   2 min quad STM   Stretch HS Quad 3 min 3 min   3 min   Knee PROM 3 min 3 min  CP x 6 mins  3 min              Flex AROM 105 deg 108 deg 108 deg 114 deg 120 deg   Ext AROM -1 0 deg 0 deg 0 deg 0 deg              Warm-up          NuStep 5 min 5 min  Lev 2 5 min  L2 6 min Bike  8 min   Neuro Re-Ed          Qset 30 30 30 30 30   SAQ 30 30   20   SLR 2x10 AA 2x10  no AA 1x10 3x10 SLR and s/l hip abd 20   SLS on foam     10  5s           Ther Ex          Knee PROM          Mini squat 10 20 20 2x10 20   Side step -- 5  "laps 5 laps 5 rounds 5 laps  red loop   Knee flex 20 20 20 20    LAQ 20 20 30 30 30  2#   Heel slides 30  pnut 30 30  30   Leg press 3x10  65# 3x10  65# 3x10   65# 3x15  75# 30  85#   Step ups 15  4\" 10  6\"  10  4\" 20  6\" 2x10  6\" 20  6\"                         Ther Activity                                Gait Training                               Modalities          CP                                  "

## 2024-03-12 ENCOUNTER — TELEPHONE (OUTPATIENT)
Dept: PHYSICAL THERAPY | Facility: CLINIC | Age: 69
End: 2024-03-12

## 2024-03-12 ENCOUNTER — OFFICE VISIT (OUTPATIENT)
Dept: PHYSICAL THERAPY | Facility: CLINIC | Age: 69
End: 2024-03-12
Payer: COMMERCIAL

## 2024-03-12 ENCOUNTER — NURSE TRIAGE (OUTPATIENT)
Age: 69
End: 2024-03-12

## 2024-03-12 DIAGNOSIS — M25.561 CHRONIC PAIN OF RIGHT KNEE: Primary | ICD-10-CM

## 2024-03-12 DIAGNOSIS — G89.29 CHRONIC PAIN OF RIGHT KNEE: Primary | ICD-10-CM

## 2024-03-12 PROCEDURE — 97530 THERAPEUTIC ACTIVITIES: CPT | Performed by: PHYSICAL THERAPIST

## 2024-03-12 PROCEDURE — 97110 THERAPEUTIC EXERCISES: CPT | Performed by: PHYSICAL THERAPIST

## 2024-03-12 NOTE — TELEPHONE ENCOUNTER
Regarding: SICK CALL  ----- Message from Maryjane Rosario sent at 3/12/2024 12:14 PM EDT -----  Patient calling stating she was at PT and was very SOB. They advised her to call our office.

## 2024-03-12 NOTE — TELEPHONE ENCOUNTER
"Patient call:  Pt stated provider: Dr. Lobo    Actionable item: Appointment scheduled     What is the reason for the call/chief complaint?    Reports of worsened BLACK during physical therapy today(knee surgery 5 weeks ago). Expressing concerns as this is not typical for her.   Denies chronic pulmonary diseases or other symptoms.     Dispo: Appointment scheduled for tomorrow. Minimize exertion in meantime.  [facility].  Agrees with plan.   All questions answered. No further needs at the moment.       Reason for Disposition   MODERATE longstanding difficulty breathing (e.g., speaks in phrases, SOB even at rest, pulse 100-120) and SAME as normal    Answer Assessment - Initial Assessment Questions  1. RESPIRATORY STATUS: \"Describe your breathing?\" (e.g., wheezing, shortness of breath, unable to speak, severe coughing)       Increased BLACK   2. ONSET: \"When did this breathing problem begin?\"       5 weeks ago but worse today during PT  3. PATTERN \"Does the difficult breathing come and go, or has it been constant since it started?\"       BLACK   4. SEVERITY: \"How bad is your breathing?\" (e.g., mild, moderate, severe)     - MILD: No SOB at rest, mild SOB with walking, speaks normally in sentences, can lay down, no retractions, pulse < 100.     - MODERATE: SOB at rest, SOB with minimal exertion and prefers to sit, cannot lie down flat, speaks in phrases, mild retractions, audible wheezing, pulse 100-120.     - SEVERE: Very SOB at rest, speaks in single words, struggling to breathe, sitting hunched forward, retractions, pulse > 120       Worse than before     6. CARDIAC HISTORY: \"Do you have any history of heart disease?\" (e.g., heart attac, angina, bypass surgery, angioplasty)       Orthostatic hypotension  7. LUNG HISTORY: \"Do you have any history of lung disease?\"  (e.g., pulmonary embolus, asthma, emphysema)      No although was told bronchiectasis was told to her in past   8. CAUSE: " "\"What do you think is causing the breathing problem?\"       No  9. OTHER SYMPTOMS: \"Do you have any other symptoms? (e.g., dizziness, runny nose, cough, chest pain, fever)      No    Protocols used: Breathing Difficulty-ADULT-[place]    "

## 2024-03-12 NOTE — TELEPHONE ENCOUNTER
Spoke with patient.  Advised her to follow up with pulmonologist.  Patient agreed and will contact office.

## 2024-03-12 NOTE — PROGRESS NOTES
Daily Note         Today's date: 3/12/2024  Patient name: Ina Tolbert  : 1955  MRN: [id number]  Referring provider: Jairon Kim DO  Dx:   Encounter Diagnosis     ICD-10-CM    1. Chronic pain of right knee  M25.561     G89.29           Subjective: Ina Tolbert reports no new complaints entering clinic.        Objective: See treatment diary below O2 stat 96, HR 97 BP is 121/84    Assessment:   fair. With supported squats patient demonstrated SOB, c/o dizziness and needed to sit after 10 reps. Vitals; 121/84, HR 97. O2 stat 96.  Patient symptoms reduced upon sitting 5+ minutes. Patient vocalized progressively worsening SOB with activity over past 2 weeks. Patient denies  chest pain and demonstrated no acute distress upon resting.  Patient  contacted ortho surgeon and he advised for patient to follow up with pulmonologist whom she has seen previously  phoned patient and advised her to contact pulmonologist. Patient vocalized understanding.  Deferred further weightbearing activities at this time. Patient was not in visual distress upon exiting clinic.       Patient had increased difficulty with SLR + terminal knee extension lag.  Unable to perform SAQ with half roll and clear heel from table. Primary PT aware of session.      Plan:  follow up as indicated by primary PT.                Eval/ Re-eval Auth #/ Referral # Total visits Start date  Expiration date Total active units  Total manual units  PT only or  PT+OT?   1/29/24                                                                1/29 2/9 2/12 2/14 2/16 2/19 2/21 2/23 2/27  3/1  3/5  3/7 3/12   Total units authorized  1/12 2/12 3/12 4/12 5/12 6/12 7/12 8/12 9/12 MORE AUTH   Jose aware of auth   Total visits remaining  11 10 9 8 7 6 5 4 3  2  1  0          Precautions:  Asthma        Specialty Daily Treatment Diary      Manuals 2/23/24 2/27/24 3/1/24 3/5/24 3/8/24 3/12/24   Visit # 8 9 10 11 12 13   PF mobs 2 min 2 min   2 min  "quad STM -   Stretch HS Quad 3 min 3 min   3 min -   Knee PROM 3 min 3 min  CP x 6 mins  3 min performed               Flex AROM 105 deg 108 deg 108 deg 114 deg 120 deg 120 deg   Ext AROM -1 0 deg 0 deg 0 deg 0 deg 0 deg               Warm-up           NuStep 5 min 5 min  Lev 2 5 min  L2 6 min Bike  8 min Rec bike 7 min    Neuro Re-Ed           Qset 30 30 30 30 30 5 sec x 10     SAQ 30 30   20 20 x    SLR 2x10 AA 2x10  no AA 1x10 3x10 SLR and s/l hip abd 20 20    SLS on foam     10  5s --            Ther Ex           Knee PROM           Mini squat 10 20 20 2x10 20 10 x supported   Side step -- 5 laps 5 laps 5 rounds 5 laps  red loop    Knee flex 20 20 20 20     LAQ 20 20 30 30 30  2# 2 lb 10 x 2   Heel slides 30  pnut 30 30  30 -   Leg press 3x10  65# 3x10  65# 3x10   65# 3x15  75# 30  85# 85 lb 10 x 3   Step ups 15  4\" 10  6\"  10  4\" 20  6\" 2x10  6\" 20  6\" --                           Ther Activity                                   Gait Training                                  Modalities           CP                                       "

## 2024-03-13 ENCOUNTER — OFFICE VISIT (OUTPATIENT)
Dept: PULMONOLOGY | Facility: MEDICAL CENTER | Age: 69
End: 2024-03-13
Payer: COMMERCIAL

## 2024-03-13 ENCOUNTER — HOSPITAL ENCOUNTER (OUTPATIENT)
Dept: RADIOLOGY | Facility: HOSPITAL | Age: 69
Discharge: HOME | End: 2024-03-13
Payer: COMMERCIAL

## 2024-03-13 VITALS
HEIGHT: 65 IN | TEMPERATURE: 97.7 F | RESPIRATION RATE: 12 BRPM | BODY MASS INDEX: 27.89 KG/M2 | OXYGEN SATURATION: 100 % | DIASTOLIC BLOOD PRESSURE: 62 MMHG | HEART RATE: 88 BPM | SYSTOLIC BLOOD PRESSURE: 108 MMHG | WEIGHT: 167.4 LBS

## 2024-03-13 DIAGNOSIS — R06.02 SHORTNESS OF BREATH: ICD-10-CM

## 2024-03-13 DIAGNOSIS — R06.02 SHORTNESS OF BREATH: Primary | ICD-10-CM

## 2024-03-13 DIAGNOSIS — R93.89 ABNORMAL CT OF THE CHEST: Chronic | ICD-10-CM

## 2024-03-13 PROBLEM — J47.9 BRONCHIECTASIS WITHOUT COMPLICATION (HCC): Status: RESOLVED | Noted: 2024-01-24 | Resolved: 2024-03-13

## 2024-03-13 PROCEDURE — 71275 CT ANGIOGRAPHY CHEST: CPT

## 2024-03-13 PROCEDURE — 99214 OFFICE O/P EST MOD 30 MIN: CPT | Performed by: NURSE PRACTITIONER

## 2024-03-13 RX ORDER — PREDNISONE 10 MG/1
TABLET ORAL
Qty: 50 TABLET | Refills: 0 | Status: SHIPPED | OUTPATIENT
Start: 2024-03-13

## 2024-03-13 RX ADMIN — IOHEXOL 85 ML: 350 INJECTION, SOLUTION INTRAVENOUS at 14:53

## 2024-03-13 NOTE — ASSESSMENT & PLAN NOTE
Patient is status post right knee arthroplasty.  This was done February 6, 2024 was done at [facility] ([facility]  While patient has had shortness of breath in the past she did have increase in shortness of breath yesterday during physical therapist.  Apparently the therapist felt that there was a concern for possible PE.  She has never been a smoker.  I am going to order a stat CTA.  He did not DC saturate during ambulation.  She walked 250 feet and O2 saturation stayed between 97 and 98%.  However she did become visibly more short of breath.  The interim, if patient has a negative CTA for PE.  The following will be done.  We will starting her on a tapering dose of prednisone.  These will be 10 mg tablets.  She will take 4 tablets or 40 mg daily for 4 days 3 tablets or 30 mg daily for 4 days 2 tablets or 20 mg daily x 4 days and 1 tablet or 10 mg daily x 4 days.  Distantly I can order an ANCA panel CRP and DEV.  Patient is agreed with the same.  She does have a history of Crohn's disease and also sees a rheumatologist for possible Raynaud's

## 2024-03-13 NOTE — PROGRESS NOTES
Assessment/Plan:     Problem List Items Addressed This Visit          Other    Shortness of breath - Primary (Chronic)     Patient is status post right knee arthroplasty.  This was done February 6, 2024 was done at [facility].  While patient has had shortness of breath in the past she did have increase in shortness of breath yesterday during physical therapist.  Apparently the therapist felt that there was a concern for possible PE.  She has never been a smoker.  I am going to order a stat CTA.  He did not DC saturate during ambulation.  She walked 250 feet and O2 saturation stayed between 97 and 98%.  However she did become visibly more short of breath.  The interim, if patient has a negative CTA for PE.  The following will be done.  We will starting her on a tapering dose of prednisone.  These will be 10 mg tablets.  She will take 4 tablets or 40 mg daily for 4 days 3 tablets or 30 mg daily for 4 days 2 tablets or 20 mg daily x 4 days and 1 tablet or 10 mg daily x 4 days.  Distantly I can order an ANCA panel CRP and DEV.  Patient is agreed with the same.  She does have a history of Crohn's disease and also sees a rheumatologist for possible Raynaud's         Relevant Orders    CTA chest pe study    Abnormal CT of the chest (Chronic)     Did have a CT of her chest that I personally reviewed in November 2023.  There was some mild mosaic attenuation compatible with small vessel disease versus small airway disease.  There was no PE at that time.  If patient has any similar findings on today CTA of chest we can then pursue high-resolution CT.  In the interim I will order ANCA profile CRP and an DEV she is agreeable to the same as she does have history of rheumatological disease.            Answers submitted by the patient for this visit:  Pulmonology Questionnaire (Submitted on 3/13/2024)  Chief Complaint: Primary symptoms  Do you have difficulty breathing?: Yes  Chronicity: recurrent  When did you first notice  "your symptoms?: in the past 7 days  How often do your symptoms occur?: 2 to 4 times per day  Since you first noticed this problem, how has it changed?: gradually worsening  Do you have shortness of breath that occurs with effort or exertion?: Yes  Do you have ear congestion?: No  Do you have heartburn?: No  Do you have fatigue?: Yes  Do you have nasal congestion?: No  Do you have shortness of breath when lying flat?: No  Do you have shortness of breath when you wake up?: No  Do you have sweats?: Yes  Have you experienced weight loss?: Yes  Which of the following makes your symptoms worse?: any activity  Which of the following makes your symptoms better?: nothing, rest    No follow-ups on file.  All questions are answered to the patient's satisfaction and understanding.  She verbalizes understanding.  She is encouraged to call with any further questions or concerns.    Portions of the record may have been created with voice recognition software.  Occasional wrong word or \"sound a like\" substitutions may have occurred due to the inherent limitations of voice recognition software.  Read the chart carefully and recognize, using context, where substitutions have occurred.    Electronically Signed by SHRAVAN Jimenez    ______________________________________________________________________    Chief Complaint: No chief complaint on file.      Patient ID: Ina is a 68 y.o. y.o. female has a past medical history of Abdominal adhesions, Alcoholism ([facility]) (1996), Anesthesia complication, Anxiety, Arthritis, Asthma, Cancer ([facility]), Colon polyp, Crohn's disease ([facility]), Depression, Depression, Disease of thyroid gland, Fibromyalgia, Fibromyalgia, primary, Fractures (2006), GERD (gastroesophageal reflux disease), History of cholecystectomy (09/07/2019), Hyperlipidemia, Infected tooth (01/2024), Irregular heart beat, Irritable bowel syndrome (1990), Lazy eye, Medical clearance for psychiatric admission (07/13/2021), Melanoma ([facility]) " (2010), Mild asthma (11/04/2020), Osteoarthritis (07/2018), Osteopenia, Polymyalgia ([facility]), Psychiatric disorder, Shortness of breath, Stomach disorder (1995), Tinnitus, and Wears glasses.    3/13/2024  Patient presents today for follow-up visit.  primary symptoms  Associated symptoms include coughing and myalgias. Pertinent negatives include no chest pain, fever, headaches or sore throat.       Ina today for follow-up.  She had a CT of the chest abdomen and pelvis November 2023.  She had no filling defects to suggest acute or chronic pulmonary embolism and normal caliber main pulmonary artery there was mild suggestion of mild mosaic attenuation compatible with small vessel small airway disease.  There was no suspicious lesions no consolidation and no edema.  She was seen in consultation in December 2023 this was by pulmonology.  She was here essentially for a shortness of breath.  He had suggested that patient have PFTs albuterol as needed and also [place] allergy panel this patient has a history of Crohn's disease TIA hypertension hypothyroidism ism and Raynaud's  Patient did see rheumatologist at [facility].  Her last office visit was in November 2023.  Patient has a history of Crohn's disease.  She is currently being treated for the same she also has a history of positive DEV.  Patient is never smoked.  Shortness of breath began  Review of Systems   Constitutional:  Negative for appetite change and fever.   HENT:  Negative for ear pain, postnasal drip, rhinorrhea, sneezing, sore throat and trouble swallowing.    Respiratory:  Positive for cough and shortness of breath.         Deep cough   Cardiovascular:  Negative for chest pain.   Musculoskeletal:  Positive for myalgias.   Allergic/Immunologic: Negative.    Neurological: Negative.  Negative for headaches.   Hematological: Negative.    Psychiatric/Behavioral: Negative.         Smoking history: She reports that she has never smoked. She  has never been exposed to tobacco smoke. She has never used smokeless tobacco.    The following portions of the patient's history were reviewed and updated as appropriate: allergies, current medications, past family history, past medical history, past social history, past surgical history, and problem list.    Immunization History   Administered Date(s) Administered    COVID-19 J&J (Glowbl) vaccine 0.5 mL 04/28/2021    COVID-19 MODERNA VACC 0.25 ML IM BOOSTER 01/25/2022    COVID-19 MODERNA VACC 0.5 ML IM 01/25/2022    INFLUENZA 09/21/2012, 03/04/2014, 12/12/2014, 03/05/2018, 09/27/2018, 11/18/2019, 11/19/2020    Influenza Quadrivalent Preservative Free 3 years and older IM 03/05/2018    Influenza, high dose seasonal 0.7 mL 11/10/2021, 11/24/2022    Influenza, recombinant, quadrivalent,injectable, preservative free 10/07/2020    Pneumococcal Conjugate 13-Valent 09/05/2019, 09/05/2019, 11/10/2021    Tdap 09/21/2012, 11/25/2020    Tuberculin Skin Test-PPD Intradermal 08/03/2021    Zoster Vaccine Recombinant 10/07/2020     Current Outpatient Medications   Medication Sig Dispense Refill    acetaminophen (TYLENOL) 325 mg tablet Take 3 tablets (975 mg total) by mouth every 8 (eight) hours      albuterol (ProAir HFA) 90 mcg/act inhaler Inhale 2 puffs every 6 (six) hours as needed for wheezing 8.5 g 0    aspirin 325 mg tablet Take 1 tablet (325 mg total) by mouth 2 (two) times a day 82 tablet 0    Calcium Carb-Cholecalciferol 600-12.5 MG-MCG CAPS Take by mouth daily in the early morning      docusate sodium (COLACE) 100 mg capsule Take 1 capsule (100 mg total) by mouth 2 (two) times a day 60 capsule 0    DULoxetine (CYMBALTA) 20 mg capsule Take 1 capsule (20 mg total) by mouth daily after dinner 90 capsule 0    DULoxetine (CYMBALTA) 60 mg delayed release capsule Take 1 capsule (60 mg total) by mouth daily 90 capsule 1    esomeprazole (NexIUM) 40 MG capsule TAKE 1 CAPSULE BY MOUTH EVERY DAY BEFORE BREAKFAST 90 capsule 1     "gabapentin (NEURONTIN) 300 mg capsule Take 1 capsule (300 mg total) by mouth 2 (two) times a day 60 capsule 5    levothyroxine 50 mcg tablet TAKE 1 TABLET BY MOUTH EVERY DAY 90 tablet 1    mesalamine (LIALDA) 1.2 g EC tablet TAKE 4 TABLETS BY MOUTH DAILY WITH BREAKFAST. 360 tablet 1    naloxone (NARCAN) 4 mg/0.1 mL nasal spray Administer 1 spray into a nostril. If no response after 2-3 minutes, give another dose in the other nostril using a new spray. 1 each 0    QUEtiapine (SEROquel) 100 mg tablet Take 1 tablet (100 mg total) by mouth daily at bedtime 90 tablet 1    Sodium Fluoride 5000 PPM 1.1 % PSTE USE ONCE DAILY PREFERABLY AT NIGHT      Magnesium 400 MG TABS Take 800 mg by mouth daily (Patient not taking: Reported on 2/21/2024)      midodrine (PROAMATINE) 10 MG tablet Take 1 tablet (10 mg total) by mouth in the morning Do not start before November 18, 2023. (Patient taking differently: Take 5 mg by mouth in the morning) 30 tablet 0    oxyCODONE (Roxicodone) 5 immediate release tablet Take 1 tablet (5 mg total) by mouth every 6 (six) hours as needed for moderate pain Max Daily Amount: 20 mg 30 tablet 0     No current facility-administered medications for this visit.     Allergies: Meperidine and Sulfa antibiotics    Objective:  Vitals:    03/13/24 1306   BP: 108/62   BP Location: Left arm   Patient Position: Sitting   Cuff Size: Standard   Pulse: 88   Resp: 12   Temp: 97.7 °F (36.5 °C)   TempSrc: Temporal   SpO2: 100%   Weight: 75.9 kg (167 lb 6.4 oz)   Height: 5' 5\" (1.651 m)   Oxygen Therapy  SpO2: 100 %  .  Wt Readings from Last 3 Encounters:   03/13/24 75.9 kg (167 lb 6.4 oz)   02/21/24 77.1 kg (170 lb)   02/10/24 79.4 kg (175 lb)     Body mass index is 27.86 kg/m².    Physical Exam  Constitutional:       Appearance: She is obese.   HENT:      Head: Normocephalic.      Nose: Nose normal.   Eyes:      Pupils: Pupils are equal, round, and reactive to light.   Cardiovascular:      Rate and Rhythm: Normal rate " and regular rhythm.      Pulses: Normal pulses.      Heart sounds: Normal heart sounds.   Pulmonary:      Breath sounds: Wheezing present.   Musculoskeletal:         General: Normal range of motion.      Cervical back: Normal range of motion.   Skin:     General: Skin is warm and dry.      Capillary Refill: Capillary refill takes less than 2 seconds.   Neurological:      General: No focal deficit present.      Mental Status: She is alert and oriented to person, place, and time.   Psychiatric:         Mood and Affect: Mood normal.         Behavior: Behavior normal.         Lab Review:   Admission on 02/06/2024, Discharged on 02/07/2024   Component Date Value    POC Glucose 02/06/2024 112     MRSA Culture Only 02/06/2024 No Methicillin Resistant Staphlyococcus aureus (MRSA) isolated     Sodium 02/07/2024 136     Potassium 02/07/2024 4.9     Chloride 02/07/2024 108     CO2 02/07/2024 19 (L)     ANION GAP 02/07/2024 9     BUN 02/07/2024 19     Creatinine 02/07/2024 0.63     Glucose 02/07/2024 152 (H)     Glucose, Fasting 02/07/2024 152 (H)     Calcium 02/07/2024 8.9     eGFR 02/07/2024 93     WBC 02/07/2024 15.76 (H)     RBC 02/07/2024 3.60 (L)     Hemoglobin 02/07/2024 10.9 (L)     Hematocrit 02/07/2024 32.1 (L)     MCV 02/07/2024 89     MCH 02/07/2024 30.3     MCHC 02/07/2024 34.0     RDW 02/07/2024 13.0     MPV 02/07/2024 10.6     Platelets 02/07/2024 284     nRBC 02/07/2024 0     Neutrophils Relative 02/07/2024 87 (H)     Immature Grans % 02/07/2024 1     Lymphocytes Relative 02/07/2024 6 (L)     Monocytes Relative 02/07/2024 6     Eosinophils Relative 02/07/2024 0     Basophils Relative 02/07/2024 0     Neutrophils Absolute 02/07/2024 13.80 (H)     Absolute Immature Grans 02/07/2024 0.09     Absolute Lymphocytes 02/07/2024 0.89     Absolute Monocytes 02/07/2024 0.97     Eosinophils Absolute 02/07/2024 0.00     Basophils Absolute 02/07/2024 0.01    Appointment on 01/18/2024   Component Date Value    Sodium  01/18/2024 141     Potassium 01/18/2024 4.6     Chloride 01/18/2024 105     CO2 01/18/2024 25     ANION GAP 01/18/2024 11     BUN 01/18/2024 17     Creatinine 01/18/2024 0.83     Glucose 01/18/2024 126     Calcium 01/18/2024 9.4     AST 01/18/2024 34     ALT 01/18/2024 35     Alkaline Phosphatase 01/18/2024 61     Total Protein 01/18/2024 6.4     Albumin 01/18/2024 4.1     Total Bilirubin 01/18/2024 0.39     eGFR 01/18/2024 73     WBC 01/18/2024 4.70     RBC 01/18/2024 4.56     Hemoglobin 01/18/2024 13.1     Hematocrit 01/18/2024 41.4     MCV 01/18/2024 91     MCH 01/18/2024 28.7     MCHC 01/18/2024 31.6     RDW 01/18/2024 13.5     MPV 01/18/2024 10.6     Platelets 01/18/2024 272     nRBC 01/18/2024 0     Neutrophils Relative 01/18/2024 64     Immature Grans % 01/18/2024 0     Lymphocytes Relative 01/18/2024 22     Monocytes Relative 01/18/2024 11     Eosinophils Relative 01/18/2024 2     Basophils Relative 01/18/2024 1     Neutrophils Absolute 01/18/2024 3.00     Absolute Immature Grans 01/18/2024 0.01     Absolute Lymphocytes 01/18/2024 1.05     Absolute Monocytes 01/18/2024 0.50     Eosinophils Absolute 01/18/2024 0.10     Basophils Absolute 01/18/2024 0.04     Protime 01/18/2024 12.9     INR 01/18/2024 0.98     PTT 01/18/2024 27     MRSA Culture Only 01/18/2024 Methicillin Resistant Staphylococcus aureus isolated (A)     MRSA Culture Only 01/18/2024 This patient requires contact isolation precautions per [place] law. Contact precautions are not required in [place] for nasal surveillance cultures.     Hemoglobin A1C 01/18/2024 6.6 (H)     EAG 01/18/2024 143    Orders Only on 01/10/2024   Component Date Value    IgG 01/10/2024 714     IgG 1 01/10/2024 436     IgG 2 01/10/2024 201     IgG 3 01/10/2024 59     IgG 4 01/10/2024 25     IgA 01/10/2024 218     IGM 01/10/2024 120     Class Description 01/10/2024 Comment     Immunoglobulin E, Total 01/10/2024 <2 (L)     D.PTERONYSSINUS IGE 01/10/2024 <0.10     D.  FARINAE 01/10/2024 <0.10     Cat Dander 01/10/2024 <0.10     DOG DANDER IGE 01/10/2024 <0.10     BERMUDA GRASS IGE 01/10/2024 <0.10     VAMSI GRASS 01/10/2024 <0.10     COCKROACH, Bahamian 01/10/2024 <0.10     PENICILLIUM CHRYSOGENUM 01/10/2024 <0.10     Cladosporium Herbarum 01/10/2024 <0.10     Aspergillus fumigatus 01/10/2024 <0.10     Alternaria Alternata 01/10/2024 <0.10     MAPLE/BOX ELDER IGE 01/10/2024 <0.10     Birch 01/10/2024 <0.10     MOUNTAIN CEDAR 01/10/2024 <0.10     T007 [place], White 01/10/2024 <0.10     [place] 01/10/2024 <0.10     [place] 01/10/2024 <0.10     [place] MAPLE LEAF 01/10/2024 <0.10     [place] 01/10/2024 <0.10     WHITE MARITZA 01/10/2024 <0.10     T070 White [place] 01/10/2024 <0.10     Short Ragwd(A kevin.) * 01/10/2024 <0.10     MUGWORT IGE 01/10/2024 <0.10     COMMON PIGWEED 01/10/2024 <0.10     SHEEP SORREL 01/10/2024 <0.10     Mouse Urine Proteins 01/10/2024 <0.10    No results displayed because visit has over 200 results.      Orders Only on 11/01/2023   Component Date Value    HEP C AB 11/01/2023 NON-REACTIVE    Office Visit on 10/05/2023   Component Date Value    Magnesium, Serum 10/06/2023 2.3     Creatine Kinase, Total 10/06/2023 247 (H)     25-HYDROXY VIT D 10/06/2023 42.9     Macro Type 2 10/06/2023 0     CK-MM 10/06/2023 100     Macro Type 1 10/06/2023 0     CK-MB 10/06/2023 0     CK-BB 10/06/2023 0    Admission on 10/03/2023, Discharged on 10/03/2023   Component Date Value    WBC 10/03/2023 6.26     RBC 10/03/2023 4.73     Hemoglobin 10/03/2023 13.5     Hematocrit 10/03/2023 41.4     MCV 10/03/2023 88     MCH 10/03/2023 28.5     MCHC 10/03/2023 32.6     RDW 10/03/2023 14.0     MPV 10/03/2023 9.9     Platelets 10/03/2023 220     nRBC 10/03/2023 0     Neutrophils Relative 10/03/2023 60     Immature Grans % 10/03/2023 0     Lymphocytes Relative 10/03/2023 24     Monocytes Relative 10/03/2023 13 (H)     Eosinophils Relative 10/03/2023 2     Basophils Relative  10/03/2023 1     Neutrophils Absolute 10/03/2023 3.81     Absolute Immature Grans 10/03/2023 0.01     Absolute Lymphocytes 10/03/2023 1.50     Absolute Monocytes 10/03/2023 0.81     Eosinophils Absolute 10/03/2023 0.10     Basophils Absolute 10/03/2023 0.03     Sodium 10/03/2023 138     Potassium 10/03/2023 4.5     Chloride 10/03/2023 104     CO2 10/03/2023 28     ANION GAP 10/03/2023 6     BUN 10/03/2023 20     Creatinine 10/03/2023 0.76     Glucose 10/03/2023 93     Calcium 10/03/2023 9.0     AST 10/03/2023 101 (H)     ALT 10/03/2023 190 (H)     Alkaline Phosphatase 10/03/2023 78     Total Protein 10/03/2023 7.0     Albumin 10/03/2023 4.1     Total Bilirubin 10/03/2023 0.37     eGFR 10/03/2023 81     Lipase 10/03/2023 18     Color, UA 10/03/2023 Light Yellow     Clarity, UA 10/03/2023 Clear     Specific Gravity, UA 10/03/2023 1.010     pH, UA 10/03/2023 8.0     Leukocytes, UA 10/03/2023 Negative     Nitrite, UA 10/03/2023 Positive (A)     Protein, UA 10/03/2023 Negative     Glucose, UA 10/03/2023 Negative     Ketones, UA 10/03/2023 Negative     Urobilinogen, UA 10/03/2023 0.2     Bilirubin, UA 10/03/2023 Negative     Occult Blood, UA 10/03/2023 Negative     RBC, UA 10/03/2023 0-1     WBC, UA 10/03/2023 1-2     Epithelial Cells 10/03/2023 Occasional     Bacteria, UA 10/03/2023 Occasional     Urine Culture 10/03/2023 >100,000 cfu/ml        Past Surgical History:   Procedure Laterality Date    ABDOMINAL SURGERY      lysis of adhesions x 2    APPENDECTOMY      CERVICAL FUSION      May 5,2021 and Jan. 01,2020    CHOLECYSTECTOMY      open    COLONOSCOPY      DILATION AND CURETTAGE OF UTERUS      FOOT SURGERY  05/2017    NECK SURGERY  01/2019 5/2021    NEUROMA EXCISION Right 05/26/2017    Procedure: EXCISION MASS / FIBROMA FOOT;  Surgeon: Jorden Crespo DPM;  Location: [facility];  Service:     PARS PLANA VITRECTOMY W/ REPAIR OF MACULAR HOLE  12/23/2020    DC ARTHRP KNE CONDYLE&PLATU MEDIAL&LAT COMPARTMENTS Right  2/6/2024    Procedure: ARTHROPLASTY KNEE TOTAL W ROBOT - RIGHT - PRESS FIT - OVERNIGHT;  Surgeon: Jairon Kim DO;  Location: [facility];  Service: Orthopedics    VT XCAPSL CTRC RMVL INSJ IO LENS PROSTH W/O ECP Left 12/06/2021    Procedure: EXTRACTION EXTRACAPSULAR CATARACT PHACO INTRAOCULAR LENS (IOL);  Surgeon: Mandeep Chavez MD;  Location: [facility] MAIN OR;  Service: Ophthalmology    RIGHT OOPHORECTOMY      UPPER GASTROINTESTINAL ENDOSCOPY  5/2019    WISDOM TOOTH EXTRACTION      x4        Family History   Problem Relation Age of Onset    Heart disease Mother     Stroke Mother 62    COPD Mother     Arthritis Mother     Asthma Mother     Hypertension Father     Dislocations Sister     Multiple sclerosis Sister     Neurological problems Sister     Scoliosis Sister     Depression Sister     Mental illness Sister     No Known Problems Maternal Grandmother     No Known Problems Maternal Grandfather     No Known Problems Paternal Grandmother     No Known Problems Paternal Grandfather     Kidney disease Brother         kidney transplant    No Known Problems Maternal Aunt     No Known Problems Maternal Uncle     No Known Problems Paternal Aunt     No Known Problems Paternal Uncle     ADD / ADHD Neg Hx     Anesthesia problems Neg Hx     Cancer Neg Hx     Clotting disorder Neg Hx     Collagen disease Neg Hx     Diabetes Neg Hx     Dislocations Neg Hx     Learning disabilities Neg Hx     Neurological problems Neg Hx     Osteoporosis Neg Hx     Rheumatologic disease Neg Hx     Scoliosis Neg Hx     Vascular Disease Neg Hx         Diagnostics:  I have personally reviewed pertinent reports.    CT of chest perform  Office Spirometry Results:     ESS:    XR knee 3 vw right non injury    Result Date: 2/21/2024  Narrative: RIGHT KNEE INDICATION:   Presence of right artificial knee joint.   COMPARISON:  None. VIEWS:  XR KNEE 3 VW RIGHT NON INJURY FINDINGS: There is no acute fracture or dislocation. There is a large joint effusion.  Compared to prior study of 2/6/2024, patient is status post recent total knee replacement. Satisfactory alignment. Tibial and femoral components show hardware is intact. No loosening. Patellar button is aligned. No heterotopic bone formation. Resolution of previous postsurgical subcutaneous emphysema. No lytic or blastic osseous lesion. Soft tissues are unremarkable.     Impression: Satisfactory appearing total right knee prosthesis as detailed above. Resolution of previous postoperative subcutaneous emphysema. Enlarging suprapatellar effusion however noted compared to prior. Electronically signed: 02/21/2024 05:57 PM Roby Kat MD

## 2024-03-13 NOTE — PATIENT INSTRUCTIONS
I have ordered a CAT scan of your chest.  This will be done with IV contrast.  Your last blood work that looked at your kidney function was normal  You will be getting a CAT scan and pending results the following will likely be done, I have ordered 10 mg tablets of prednisone.  Beginning tonight or tomorrow you can take 4 tablets with food for 4 days then 3 tablets with food for 4 days 2 tablets with food for 4 days 1 tablet with food for 4 days.  I have also ordered some blood work I would like you to have done this can be done at your earliest convenience ever I would finish your prednisone taper first and then go accordingly   No

## 2024-03-13 NOTE — ASSESSMENT & PLAN NOTE
Did have a CT of her chest that I personally reviewed in November 2023.  There was some mild mosaic attenuation compatible with small vessel disease versus small airway disease.  There was no PE at that time.  If patient has any similar findings on today CTA of chest we can then pursue high-resolution CT.  In the interim I will order ANCA profile CRP and an DEV she is agreeable to the same as she does have history of rheumatological disease.

## 2024-03-15 ENCOUNTER — APPOINTMENT (OUTPATIENT)
Dept: PHYSICAL THERAPY | Facility: CLINIC | Age: 69
End: 2024-03-15
Payer: COMMERCIAL

## 2024-03-19 ENCOUNTER — OFFICE VISIT (OUTPATIENT)
Dept: PHYSICAL THERAPY | Facility: CLINIC | Age: 69
End: 2024-03-19
Payer: COMMERCIAL

## 2024-03-19 DIAGNOSIS — G89.29 CHRONIC PAIN OF RIGHT KNEE: Primary | ICD-10-CM

## 2024-03-19 DIAGNOSIS — M25.561 CHRONIC PAIN OF RIGHT KNEE: Primary | ICD-10-CM

## 2024-03-19 DIAGNOSIS — M17.11 PRIMARY OSTEOARTHRITIS OF RIGHT KNEE: ICD-10-CM

## 2024-03-19 PROCEDURE — 97110 THERAPEUTIC EXERCISES: CPT | Performed by: PHYSICAL THERAPIST

## 2024-03-19 PROCEDURE — 97112 NEUROMUSCULAR REEDUCATION: CPT | Performed by: PHYSICAL THERAPIST

## 2024-03-19 NOTE — PROGRESS NOTES
Daily Note         Today's date: 3/19/2024  Patient name: Ina Tolbert  : 1955  MRN: [id number]  Referring provider: Jairon Kim DO  Dx:   Encounter Diagnosis     ICD-10-CM    1. Chronic pain of right knee  M25.561     G89.29       2. Primary osteoarthritis of right knee  M17.11           Subjective: No significant pain        Objective: See treatment diary below       Assessment:  She had no episodes of loss of breath this session and did not feel overly fatigued.  ROM is normalizing well.  She would benefit from continued quad and glute strengthening to improve her ability to perform a full height step up.      Plan: Continue per plan of care.  Progress treatment as tolerated.                Eval/ Re-eval Auth #/ Referral # Total visits Start date  Expiration date Total active units  Total manual units  PT only or  PT+OT?   1/29/24                                                                1/29 2/9 2/12 2/14 2/16 2/19 2/21 2/23 2/27  3/1  3/5  3   Total units authorized  1/12 2/12 3/12 4/12 5/12 6/12 7/12 8/12 9/12 MORE AUTH     Total visits remaining  11 10 9 8 7 6 5 4 3  2  1  0          3/12 3/19             Total units authorized               Total visits remaining  11 10                  Precautions:  Asthma        Specialty Daily Treatment Diary      Manuals 2/27/24 3/1/24 3/5/24 3/8/24 3/12/24 3/19/24   Visit # 9 10 11 12 13 14   PF mobs 2 min   2 min quad STM - 1 min   Stretch HS Quad 3 min   3 min - 4 min   Knee PROM 3 min  CP x 6 mins  3 min performed 3 min              Flex AROM 108 deg 108 deg 114 deg 120 deg 120 deg 125 deg   Ext AROM 0 deg 0 deg 0 deg 0 deg 0 deg 0 deg              Warm-up          NuStep 5 min  Lev 2 5 min  L2 6 min Bike  8 min Rec bike 7 min  8 min NS   Neuro Re-Ed          Qset 30 30 30 30 5 sec x 10   20   SAQ 30   20 20 x  20   SLR 2x10  no AA 1x10 3x10 SLR and s/l hip abd 20 20  20   SLS on foam    10  5s --             Ther Ex    "       Knee PROM          Mini squat 20 20 2x10 20 10 x supported 20   Side step 5 laps 5 laps 5 rounds 5 laps  red loop     Knee flex 20 20 20      LAQ 20 30 30 30  2# 2 lb 10 x 2 20  2.5#   Heel slides 30 30  30 - 20   Leg press 3x10  65# 3x10   65# 3x15  75# 30  85# 85 lb 10 x 3 85#  30   Step ups 10  6\"  10  4\" 20  6\" 2x10  6\" 20  6\" -- 20  6\"                         Ther Activity                                Gait Training                               Modalities          CP                                     "

## 2024-03-20 ENCOUNTER — OFFICE VISIT (OUTPATIENT)
Dept: OBGYN CLINIC | Facility: CLINIC | Age: 69
End: 2024-03-20

## 2024-03-20 VITALS
HEIGHT: 65 IN | HEART RATE: 100 BPM | SYSTOLIC BLOOD PRESSURE: 139 MMHG | DIASTOLIC BLOOD PRESSURE: 90 MMHG | WEIGHT: 166 LBS | BODY MASS INDEX: 27.66 KG/M2

## 2024-03-20 DIAGNOSIS — Z47.1 AFTERCARE FOLLOWING RIGHT KNEE JOINT REPLACEMENT SURGERY: ICD-10-CM

## 2024-03-20 DIAGNOSIS — Z96.651 AFTERCARE FOLLOWING RIGHT KNEE JOINT REPLACEMENT SURGERY: ICD-10-CM

## 2024-03-20 DIAGNOSIS — Z96.651 S/P TOTAL KNEE REPLACEMENT NOT USING CEMENT, RIGHT: Primary | ICD-10-CM

## 2024-03-20 PROCEDURE — 99024 POSTOP FOLLOW-UP VISIT: CPT | Performed by: ORTHOPAEDIC SURGERY

## 2024-03-20 NOTE — PROGRESS NOTES
Assessment/Plan:  1. S/P total knee replacement not using cement, right        2. Aftercare following right knee joint replacement surgery          Scribe Attestation      I,:  Kong Zarate am acting as a scribe while in the presence of the attending physician.:       I,:  Jairon Kim, DO personally performed the services described in this documentation    as scribed in my presence.:           Ina is a pleasant 68-year-old female who returns today for follow-up evaluation 6 weeks status post right total knee arthroplasty.  I am very pleased with her clinical presentation today in the office.  She should continue with her efforts at physical therapy with discharge to home exercise program as appropriate.  She no longer requires DVT prophylaxis.  She may continue with activity to her tolerance.  All of her questions and concerns were addressed today.  We will see her back in 6 weeks for her 3-month postoperative appointment with new x-ray on arrival.    Subjective: Follow-up evaluation 6 weeks status post right total knee arthroplasty    Patient ID: Ina Tolbert is a 68 y.o. female who returns today for follow-up evaluation 6 weeks status post right total knee arthroplasty.  She reports that she has been doing very well.  She has been participating in physical therapy.  She has been returning to activity without limitation.  She denies any pain about her knee with activity.  She is very pleased with her progress.  She denies any new injury or trauma.    Review of Systems   Constitutional:  Positive for activity change. Negative for chills, fever and unexpected weight change.   HENT:  Negative for hearing loss, nosebleeds and sore throat.    Eyes:  Negative for pain, redness and visual disturbance.   Respiratory:  Negative for cough, shortness of breath and wheezing.    Cardiovascular:  Negative for chest pain, palpitations and leg swelling.   Gastrointestinal:  Negative for abdominal pain, nausea and  vomiting.   Endocrine: Negative for polydipsia and polyuria.   Genitourinary:  Negative for dysuria and hematuria.   Musculoskeletal:  Negative for arthralgias, joint swelling and myalgias.        See HPI   Skin:  Negative for rash and wound.   Neurological:  Negative for dizziness, numbness and headaches.   Psychiatric/Behavioral:  Negative for decreased concentration and suicidal ideas. The patient is not nervous/anxious.          Past Medical History:   Diagnosis Date    Abdominal adhesions     Alcoholism ([facility]) 1996    in remission    Anesthesia complication     difficult to wake up    Anxiety     Arthritis     Asthma     with exertion    Cancer ([facility])     melanoma    Colon polyp     Crohn's disease ([facility])     Depression     Depression     Disease of thyroid gland     Fibromyalgia     Fibromyalgia, primary     Fractures 2006    GERD (gastroesophageal reflux disease)     History of cholecystectomy 09/07/2019    Hyperlipidemia     Infected tooth 01/2024    Tooth #14-was treated with antibiotics-endodontal appt 2/1 may need root canal    Irregular heart beat     can be tachy; also has orthoststic hypotension    Irritable bowel syndrome 1990    Lazy eye     resolved: 3/27/17    Medical clearance for psychiatric admission 07/13/2021    Melanoma ([facility]) 2010    Mild asthma 11/04/2020    Osteoarthritis 07/2018    Osteopenia     with joint pain-elevated DEV    Polymyalgia ([facility])     Psychiatric disorder     Shortness of breath     assoc with tachycardia    Stomach disorder 1995    Tinnitus     Wears glasses     for reading       Past Surgical History:   Procedure Laterality Date    ABDOMINAL SURGERY      lysis of adhesions x 2    APPENDECTOMY      CERVICAL FUSION      May 5,2021 and Jan. 01,2020    CHOLECYSTECTOMY      open    COLONOSCOPY      DILATION AND CURETTAGE OF UTERUS      FOOT SURGERY  05/2017    NECK SURGERY  01/2019 5/2021    NEUROMA EXCISION Right 05/26/2017    Procedure: EXCISION MASS / FIBROMA FOOT;  Surgeon:  Jorden Crespo DPM;  Location: [facility];  Service:     PARS PLANA VITRECTOMY W/ REPAIR OF MACULAR HOLE  12/23/2020    IN ARTHRP KNE CONDYLE&PLATU MEDIAL&LAT COMPARTMENTS Right 2/6/2024    Procedure: ARTHROPLASTY KNEE TOTAL W ROBOT - RIGHT - PRESS FIT - OVERNIGHT;  Surgeon: Jairon Kim DO;  Location: [facility];  Service: Orthopedics    IN XCAPSL CTRC RMVL INSJ IO LENS PROSTH W/O ECP Left 12/06/2021    Procedure: EXTRACTION EXTRACAPSULAR CATARACT PHACO INTRAOCULAR LENS (IOL);  Surgeon: Mandeep Chavez MD;  Location: [facility] MAIN OR;  Service: Ophthalmology    RIGHT OOPHORECTOMY      UPPER GASTROINTESTINAL ENDOSCOPY  5/2019    WISDOM TOOTH EXTRACTION      x4       Family History   Problem Relation Age of Onset    Heart disease Mother     Stroke Mother 62    COPD Mother     Arthritis Mother     Asthma Mother     Hypertension Father     Dislocations Sister     Multiple sclerosis Sister     Neurological problems Sister     Scoliosis Sister     Depression Sister     Mental illness Sister     No Known Problems Maternal Grandmother     No Known Problems Maternal Grandfather     No Known Problems Paternal Grandmother     No Known Problems Paternal Grandfather     Kidney disease Brother         kidney transplant    No Known Problems Maternal Aunt     No Known Problems Maternal Uncle     No Known Problems Paternal Aunt     No Known Problems Paternal Uncle     ADD / ADHD Neg Hx     Anesthesia problems Neg Hx     Cancer Neg Hx     Clotting disorder Neg Hx     Collagen disease Neg Hx     Diabetes Neg Hx     Dislocations Neg Hx     Learning disabilities Neg Hx     Neurological problems Neg Hx     Osteoporosis Neg Hx     Rheumatologic disease Neg Hx     Scoliosis Neg Hx     Vascular Disease Neg Hx        Social History     Occupational History    Not on file   Tobacco Use    Smoking status: Never     Passive exposure: Never    Smokeless tobacco: Never   Vaping Use    Vaping status: Never Used   Substance and Sexual Activity     Alcohol use: Not Currently    Drug use: No    Sexual activity: Not Currently     Partners: Male         Current Outpatient Medications:     acetaminophen (TYLENOL) 325 mg tablet, Take 3 tablets (975 mg total) by mouth every 8 (eight) hours, Disp: , Rfl:     albuterol (ProAir HFA) 90 mcg/act inhaler, Inhale 2 puffs every 6 (six) hours as needed for wheezing, Disp: 8.5 g, Rfl: 0    aspirin 325 mg tablet, Take 1 tablet (325 mg total) by mouth 2 (two) times a day, Disp: 82 tablet, Rfl: 0    Calcium Carb-Cholecalciferol 600-12.5 MG-MCG CAPS, Take by mouth daily in the early morning, Disp: , Rfl:     docusate sodium (COLACE) 100 mg capsule, Take 1 capsule (100 mg total) by mouth 2 (two) times a day, Disp: 60 capsule, Rfl: 0    DULoxetine (CYMBALTA) 20 mg capsule, Take 1 capsule (20 mg total) by mouth daily after dinner, Disp: 90 capsule, Rfl: 0    DULoxetine (CYMBALTA) 60 mg delayed release capsule, Take 1 capsule (60 mg total) by mouth daily, Disp: 90 capsule, Rfl: 1    esomeprazole (NexIUM) 40 MG capsule, TAKE 1 CAPSULE BY MOUTH EVERY DAY BEFORE BREAKFAST, Disp: 90 capsule, Rfl: 1    gabapentin (NEURONTIN) 300 mg capsule, Take 1 capsule (300 mg total) by mouth 2 (two) times a day, Disp: 60 capsule, Rfl: 5    levothyroxine 50 mcg tablet, TAKE 1 TABLET BY MOUTH EVERY DAY, Disp: 90 tablet, Rfl: 1    mesalamine (LIALDA) 1.2 g EC tablet, TAKE 4 TABLETS BY MOUTH DAILY WITH BREAKFAST., Disp: 360 tablet, Rfl: 1    naloxone (NARCAN) 4 mg/0.1 mL nasal spray, Administer 1 spray into a nostril. If no response after 2-3 minutes, give another dose in the other nostril using a new spray., Disp: 1 each, Rfl: 0    predniSONE 10 mg tablet, 4 Tabs daily x 4 days, 3 tabs daily x 4 days, 2 tabs daily x 4 days, 1 tab daily x 4 days, Disp: 50 tablet, Rfl: 0    QUEtiapine (SEROquel) 100 mg tablet, Take 1 tablet (100 mg total) by mouth daily at bedtime, Disp: 90 tablet, Rfl: 1    Sodium Fluoride 5000 PPM 1.1 % PSTE, USE ONCE DAILY PREFERABLY AT  NIGHT, Disp: , Rfl:     Magnesium 400 MG TABS, Take 800 mg by mouth daily (Patient not taking: Reported on 2/21/2024), Disp: , Rfl:     midodrine (PROAMATINE) 10 MG tablet, Take 1 tablet (10 mg total) by mouth in the morning Do not start before November 18, 2023. (Patient taking differently: Take 5 mg by mouth in the morning), Disp: 30 tablet, Rfl: 0    oxyCODONE (Roxicodone) 5 immediate release tablet, Take 1 tablet (5 mg total) by mouth every 6 (six) hours as needed for moderate pain Max Daily Amount: 20 mg, Disp: 30 tablet, Rfl: 0    Allergies   Allergen Reactions    Meperidine Lightheadedness and Other (See Comments)    Sulfa Antibiotics Hives       Objective:  Vitals:    03/20/24 1158   BP: 139/90   Pulse: 100       Body mass index is 27.62 kg/m².    Right Knee Exam     Muscle Strength   The patient has normal right knee strength.    Tenderness   The patient is experiencing no tenderness.     Range of Motion   Extension:  0   Flexion:  120     Tests   Varus: negative Valgus: negative  Drawer:  Anterior - negative      Patellar apprehension: negative    Other   Erythema: absent  Scars: present  Sensation: normal  Pulse: present  Swelling: none  Effusion: no effusion present    Comments:  Well-healed anterior operative scar  Stable at 0, 30 and 90 degrees  Neurovascular intact distally  No warmth or erythema  Patella tracks flat and midline          Observations     Right Knee   Negative for effusion.       Physical Exam  Vitals and nursing note reviewed.   Constitutional:       Appearance: Normal appearance. She is well-developed.   HENT:      Head: Normocephalic and atraumatic.      Right Ear: External ear normal.      Left Ear: External ear normal.   Eyes:      General: No scleral icterus.     Extraocular Movements: Extraocular movements intact.      Conjunctiva/sclera: Conjunctivae normal.   Cardiovascular:      Rate and Rhythm: Normal rate.   Pulmonary:      Effort: Pulmonary effort is normal. No respiratory  distress.   Musculoskeletal:      Cervical back: Normal range of motion and neck supple.      Right knee: No effusion.      Comments: See Ortho exam   Skin:     General: Skin is warm and dry.   Neurological:      General: No focal deficit present.      Mental Status: She is alert and oriented to person, place, and time.   Psychiatric:         Behavior: Behavior normal.         This document was created using speech voice recognition software.   Grammatical errors, random word insertions, pronoun errors, and incomplete sentences are an occasional consequence of this system due to software limitations, ambient noise, and hardware issues.   Any formal questions or concerns about content, text, or information contained within the body of this dictation should be directly addressed to the provider for clarification.

## 2024-03-22 LAB
ANA HOMOGEN TITR SER: ABNORMAL {TITER}
ANA TITR SER IF: POSITIVE {TITER}
C-ANCA TITR SER IF: NORMAL TITER
CRP SERPL-MCNC: 2 MG/L (ref 0–10)
MYELOPEROXIDASE AB SER IA-ACNC: <0.2 UNITS (ref 0–0.9)
P-ANCA ATYPICAL TITR SER IF: NORMAL TITER
P-ANCA TITR SER IF: NORMAL TITER
PROTEINASE3 AB SER IA-ACNC: <0.2 UNITS (ref 0–0.9)
SL AMB NOTE:: ABNORMAL

## 2024-03-26 ENCOUNTER — OFFICE VISIT (OUTPATIENT)
Dept: PHYSICAL THERAPY | Facility: CLINIC | Age: 69
End: 2024-03-26
Payer: COMMERCIAL

## 2024-03-26 DIAGNOSIS — G89.29 CHRONIC PAIN OF RIGHT KNEE: Primary | ICD-10-CM

## 2024-03-26 DIAGNOSIS — M25.561 CHRONIC PAIN OF RIGHT KNEE: Primary | ICD-10-CM

## 2024-03-26 PROCEDURE — 97110 THERAPEUTIC EXERCISES: CPT | Performed by: PHYSICAL THERAPIST

## 2024-03-26 PROCEDURE — 97140 MANUAL THERAPY 1/> REGIONS: CPT | Performed by: PHYSICAL THERAPIST

## 2024-03-26 NOTE — PROGRESS NOTES
Daily Note         Today's date: 3/26/2024  Patient name: Ina Tolbert  : 1955  MRN: [id number]  Referring provider: Jairon Kim DO  Dx:   Encounter Diagnosis     ICD-10-CM    1. Chronic pain of right knee  M25.561     G89.29             Subjective: Patient reports feeling good and she was up and down the stairs yesterday cleaning out her guest bedroom.        Objective: See treatment diary below       Assessment:  Patient tolerated treatment well with one spell of dizziness followed by squats. Patient required a standing rest break and dizziness resolved. Swelling evident on R knee, patient equates it to over use yesterday. Patient had increase in fatigue following side lying hip abduction. Manual revealed tightness in quads and TTP along the lateral portion which was resolved following manual. Significant HS tightness on the R. She would benefit from continued quad and glute strengthening to improve her ability to perform a full height step up.      Plan: Continue per plan of care.  Progress treatment as tolerated.                Eval/ Re-eval Auth #/ Referral # Total visits Start date  Expiration date Total active units  Total manual units  PT only or  PT+OT?   1/29/24                                                                1/29 2/9 2/12 2/14 2/16 2/19 2/21 2/23 2/27  3/1  3/5  3   Total units authorized  1/12 2/12 3/12 4/12 5/12 6/12 7/12 8/12 9/12 MORE AUTH     Total visits remaining  11 10 9 8 7 6 5 4 3  2  1  0          3/12 3/19             Total units authorized               Total visits remaining  11 10                  Precautions:  Asthma        Specialty Daily Treatment Diary      Manuals 2/27/24 3/1/24 3/5/24 3/8/24 3/12/24 3/19/24 3/26   Visit # 9 10 11 12 13 14 15   PF mobs 2 min   2 min quad STM - 1 min 1 min   Stretch HS Quad 3 min   3 min - 4 min 5 min   Knee PROM 3 min  CP x 6 mins  3 min performed 3 min 5 min               Flex AROM 108 deg 108  "deg 114 deg 120 deg 120 deg 125 deg 120 deg    Ext AROM 0 deg 0 deg 0 deg 0 deg 0 deg 0 deg 0 deg               Warm-up           NuStep 5 min  Lev 2 5 min  L2 6 min Bike  8 min Rec bike 7 min  8 min NS    Neuro Re-Ed           Qset 30 30 30 30 5 sec x 10   20 30   SAQ 30   20 20 x  20    SLR 2x10  no AA 1x10 3x10 SLR and s/l hip abd 20 20  20 20    SLS on foam    10  5s --  SLS no foam  10x 5 sec hold no UE support     Double knees to chest       Yellow peanutball 20x   Ther Ex           Knee PROM           Mini squat 20 20 2x10 20 10 x supported 20 10   Side step 5 laps 5 laps 5 rounds 5 laps  red loop      Knee flex 20 20 20    30 2#   LAQ 20 30 30 30  2# 2 lb 10 x 2 20  2.5# 3x10 3.5#   Heel slides 30 30  30 - 20 30x green strap over pressure    Leg press 3x10  65# 3x10   65# 3x15  75# 30  85# 85 lb 10 x 3 85#  30 85# 45   Step ups 10  6\"  10  4\" 20  6\" 2x10  6\" 20  6\" -- 20  6\" 6\" 30x    Side lying hip abduction         20               Ther Activity                                   Gait Training                                  Modalities           CP                                       "

## 2024-03-27 DIAGNOSIS — R76.8 ANTINEUTROPHIL CYTOPLASMIC ANTIBODY (ANCA) POSITIVE: Primary | ICD-10-CM

## 2024-03-27 NOTE — PROGRESS NOTES
I left message via telephone to patient regarding Positive ANCA and DEV results.  She did have mild mosaic attentuation noted on CT of chest in November 2023.  I ordered a CTA PE study done on March 13, 2024.  No PE was noted with subtle mosaic attentuation pattern.  I referred her to rheumatology and did leave phone number.

## 2024-03-29 ENCOUNTER — OFFICE VISIT (OUTPATIENT)
Dept: PHYSICAL THERAPY | Facility: CLINIC | Age: 69
End: 2024-03-29
Payer: COMMERCIAL

## 2024-03-29 DIAGNOSIS — G89.29 CHRONIC PAIN OF RIGHT KNEE: Primary | ICD-10-CM

## 2024-03-29 DIAGNOSIS — M17.11 PRIMARY OSTEOARTHRITIS OF RIGHT KNEE: ICD-10-CM

## 2024-03-29 DIAGNOSIS — M25.561 CHRONIC PAIN OF RIGHT KNEE: Primary | ICD-10-CM

## 2024-03-29 PROCEDURE — 97110 THERAPEUTIC EXERCISES: CPT | Performed by: PHYSICAL THERAPIST

## 2024-03-29 PROCEDURE — 97112 NEUROMUSCULAR REEDUCATION: CPT | Performed by: PHYSICAL THERAPIST

## 2024-03-29 NOTE — PROGRESS NOTES
Daily Note         Today's date: 3/29/2024  Patient name: Ina Tolbert  : 1955  MRN: [id number]  Referring provider: Jairon Kim DO  Dx:   Encounter Diagnosis     ICD-10-CM    1. Chronic pain of right knee  M25.561     G89.29       2. Primary osteoarthritis of right knee  M17.11             Subjective: Patient reports feeling good and she was up and down the stairs yesterday cleaning out her guest bedroom.        Objective: See treatment diary below       Assessment:  SLS was difficult for Jazmyn while standing on foam.  She would benefit from continued proprio training.  Patient performed inhale and exhale between each rep of squats and she did not have the dizziness that she had been reporting.      Plan: Continue per plan of care.  Progress treatment as tolerated.                Eval/ Re-eval Auth #/ Referral # Total visits Start date  Expiration date Total active units  Total manual units  PT only or  PT+OT?   1/29/24                                                                1/29 2/9 2/12 2/14 2/16 2/19 2/21 2/23 2/27  3/1  3  3   Total units authorized  1/12 2/12 3/12 4/12 5/12 6/12 7/12 8/12 9/12 MORE AUTH     Total visits remaining  11 10 9 8 7 6 5 4 3  2  1  0          3/12 3/19 3/26 3/29           Total units authorized  1/12 2/12 3/12 4/12           Total visits remaining  11 10 9 8                Precautions:  Asthma        Specialty Daily Treatment Diary      Manuals 3/8/24 3/12/24 3/19/24 3/26 3/29/24   Visit # 12 13 14 15 16   PF mobs 2 min quad STM - 1 min 1 min    Stretch HS Quad 3 min - 4 min 5 min 4 min   Knee PROM 3 min performed 3 min 5 min 4 min            Flex AROM 120 deg 120 deg 125 deg 120 deg  126 deg   Ext AROM 0 deg 0 deg 0 deg 0 deg 0 deg            Warm-up        NuStep Bike  8 min Rec bike 7 min  8 min NS  Rec bike 8 min   Neuro Re-Ed        Qset 30 5 sec x 10   20 30 30   SAQ 20 20 x  20  30   2#   SLR 20 20  20 20  30   SLS on foam 10  5s --   "SLS no foam  10x 5 sec hold no UE support   SLS on foam  20x  5s   Double knees to chest    Yellow peanutball 20x    Ther Ex        Knee PROM        Mini squat 20 10 x supported 20 10 2x5 w breathing b/t reps   Side step 5 laps  red loop       Knee flex    30 2# 30   2#   LAQ 30  2# 2 lb 10 x 2 20  2.5# 3x10 3.5# 30   2#   Heel slides 30 - 20 30x green strap over pressure  30  pnut   Leg press 30  85# 85 lb 10 x 3 85#  30 85# 45 85#  45   Step ups 20  6\" -- 20  6\" 6\" 30x 6\"  20    Side lying hip abduction     20             Ther Activity                          Gait Training                         Modalities        CP                                 "

## 2024-04-09 ENCOUNTER — OFFICE VISIT (OUTPATIENT)
Dept: PHYSICAL THERAPY | Facility: CLINIC | Age: 69
End: 2024-04-09
Payer: COMMERCIAL

## 2024-04-09 DIAGNOSIS — G89.29 CHRONIC PAIN OF RIGHT KNEE: Primary | ICD-10-CM

## 2024-04-09 DIAGNOSIS — M25.561 CHRONIC PAIN OF RIGHT KNEE: Primary | ICD-10-CM

## 2024-04-09 DIAGNOSIS — M17.11 PRIMARY OSTEOARTHRITIS OF RIGHT KNEE: ICD-10-CM

## 2024-04-09 PROCEDURE — 97110 THERAPEUTIC EXERCISES: CPT | Performed by: PHYSICAL THERAPIST

## 2024-04-09 PROCEDURE — 97112 NEUROMUSCULAR REEDUCATION: CPT | Performed by: PHYSICAL THERAPIST

## 2024-04-09 NOTE — PROGRESS NOTES
"        Daily Note         Today's date: 2024  Patient name: Ina Tolbert  : 1955  MRN: [id number]  Referring provider: Jairon Kim DO  Dx:   Encounter Diagnosis     ICD-10-CM    1. Chronic pain of right knee  M25.561     G89.29       2. Primary osteoarthritis of right knee  M17.11             Subjective: No new complaints       Objective: See treatment diary below       Assessment:  Completed squats with not feeling excessive exhaustion.  ROM is progressing well.  She completed an 8 inch step up this session with no deviations in gait.       Plan: Continue per plan of care.  Progress treatment as tolerated.                Eval/ Re-eval Auth #/ Referral # Total visits Start date  Expiration date Total active units  Total manual units  PT only or  PT+OT?   24  3  3  3   Total units authorized  1/12 2/12 3/12 4/12 5/12 6/12 7/12 8/12 9/12 MORE AUTH     Total visits remaining  11 10 9 8 7 6 5 4 3  2  1  0          3/12 3/19 3/26 3/29 49          Total units authorized  1/12 2/12 3/12 4/12 5/12          Total visits remaining  11 10 9 8 7               Precautions:  Asthma        Specialty Daily Treatment Diary      Manuals 3/12/24 3/19/24 3/26 3/29/24 4/9/24   Visit # 13 14 15 16 11   PF mobs - 1 min 1 min     Stretch HS Quad - 4 min 5 min 4 min 4 min   Knee PROM performed 3 min 5 min 4 min 4 min            Flex AROM 120 deg 125 deg 120 deg  126 deg 125 deg   Ext AROM 0 deg 0 deg 0 deg 0 deg 0 deg            Warm-up        NuStep Rec bike 7 min  8 min NS  Rec bike 8 min 8 min lev 5   Neuro Re-Ed        Qset 5 sec x 10   20 30 30    SAQ 20 x  20  30   2# 30  4#   SLR 20  20 20  30 30   SLS on foam --  SLS no foam  10x 5 sec hold no UE support   SLS on foam  20x  5s SLS  20  3\"   Double knees to chest   Yellow peanutball 20x     Ther Ex        Knee PROM        Mini squat 10 x " "supported 20 10 2x5 w breathing b/t reps 30 w breathing   Side step        Knee flex   30 2# 30   2# 30  4#   LAQ 2 lb 10 x 2 20  2.5# 3x10 3.5# 30   2# 30  4#   Heel slides - 20 30x green strap over pressure  30  pnut 30 pnut   Leg press 85 lb 10 x 3 85#  30 85# 45 85#  45 95#  45x   Step ups -- 20  6\" 6\" 30x 6\"  20 8\"  20    Side lying hip abduction    20              Ther Activity                          Gait Training                         Modalities        CP                                 "

## 2024-04-15 ENCOUNTER — TELEPHONE (OUTPATIENT)
Age: 69
End: 2024-04-15

## 2024-04-15 DIAGNOSIS — Z96.651 S/P TOTAL KNEE REPLACEMENT NOT USING CEMENT, RIGHT: Primary | ICD-10-CM

## 2024-04-15 RX ORDER — AMOXICILLIN 500 MG/1
2000 TABLET, FILM COATED ORAL
Qty: 8 TABLET | Refills: 2 | Status: SHIPPED | OUTPATIENT
Start: 2024-04-15 | End: 2024-07-14

## 2024-04-15 NOTE — TELEPHONE ENCOUNTER
Lvm she will need life long antibiotic prophylaxis, it was sent to pharmacy and will need to be taken 60 mins prior to appointment

## 2024-04-15 NOTE — TELEPHONE ENCOUNTER
Patient has dental appt 4/17/24 for  2 fillings/crown prep    Does she need abx?    Preferred pharmacy is [facility] [address] # [phone]

## 2024-04-16 ENCOUNTER — APPOINTMENT (OUTPATIENT)
Dept: PHYSICAL THERAPY | Facility: CLINIC | Age: 69
End: 2024-04-16
Payer: COMMERCIAL

## 2024-04-18 ENCOUNTER — OFFICE VISIT (OUTPATIENT)
Dept: PHYSICAL THERAPY | Facility: CLINIC | Age: 69
End: 2024-04-18
Payer: COMMERCIAL

## 2024-04-18 DIAGNOSIS — M25.561 CHRONIC PAIN OF RIGHT KNEE: Primary | ICD-10-CM

## 2024-04-18 DIAGNOSIS — G89.29 CHRONIC PAIN OF RIGHT KNEE: Primary | ICD-10-CM

## 2024-04-18 PROCEDURE — 97110 THERAPEUTIC EXERCISES: CPT | Performed by: PHYSICAL THERAPIST

## 2024-04-18 PROCEDURE — 97112 NEUROMUSCULAR REEDUCATION: CPT | Performed by: PHYSICAL THERAPIST

## 2024-04-18 NOTE — PROGRESS NOTES
Daily Note         Today's date: 2024  Patient name: Ina Tolbert  : 1955  MRN: [id number]  Referring provider: Jairon Kim DO  Dx:   Encounter Diagnosis     ICD-10-CM    1. Chronic pain of right knee  M25.561     G89.29           Subjective: Ina Tolbert reports stiffness in right knee . States she was a little inactive yesterday.        Objective: See treatment diary below    Assessment:   PLOC discussed with primary PT.  Discussed with patient the need to breathe throughout exercises.  Patient  demonstrates being cognizant of breathing particularly with supported squats.     Good tolerance to prone quad stretch. Instructed in use of dog leash for for prone quad stretch.     Plan:  progress as indicated by primary PT.               Eval/ Re-eval Auth #/ Referral # Total visits Start date  Expiration date Total active units  Total manual units  PT only or  PT+OT?   1/29/24                                                                1/29 2/9 2/12 2/14 2/16 2/19 2/21 2/23 2/27  3/1  3/5  3   Total units authorized  1/12 2/12 3/12 4/12 5/12 6/12 7/12 8/12 9/12 MORE AUTH     Total visits remaining  11 10 9 8 7 6 5 4 3  2  1  0          3/12 3/19 3/26 3/29 4/9 4/18         Total units authorized  1/12 2/12 3/12 4/12 5/12 6/12         Total visits remaining  11 10 9 8 7 6              Precautions:  Asthma        Specialty Daily Treatment Diary      Manuals 3/12/24 3/19/24 3/26 3/29/24 4/9/24 4/18/24   Visit # 13 14 15 16 11 12   PF mobs - 1 min 1 min      Stretch HS Quad - 4 min 5 min 4 min 4 min 4 min    Knee PROM performed 3 min 5 min 4 min 4 min 4 min              Flex AROM 120 deg 125 deg 120 deg  126 deg 125 deg 125 deg   Ext AROM 0 deg 0 deg 0 deg 0 deg 0 deg 0 deg             Warm-up         NuStep Rec bike 7 min  8 min NS  Rec bike 8 min 8 min lev 5 Lv 5 8 min    Neuro Re-Ed         Qset 5 sec x 10   20 30 30  W/ half roll: 30 x   SAQ 20 x  20  30   2# 30  4# 4 lb 30 x   "  SLR 20  20 20  30 30 30 x   SLS on foam --  SLS no foam  10x 5 sec hold no UE support   SLS on foam  20x  5s SLS  20  3\" 3 sec x 20    Double knees to chest   Yellow peanutball 20x      Ther Ex         Knee PROM         Mini squat 10 x supported 20 10 2x5 w breathing b/t reps 30 w breathing 15 x 2 with breathing   Side step         Knee flex   30 2# 30   2# 30  4# 4 lb: 30 x   LAQ 2 lb 10 x 2 20  2.5# 3x10 3.5# 30   2# 30  4# 4 lb: 30 x    Heel slides - 20 30x green strap over pressure  30  pnut 30 pnut 30 x pnut   Leg press 85 lb 10 x 3 85#  30 85# 45 85#  45 95#  45x 95 lb 15 x 3   Step ups -- 20  6\" 6\" 30x 6\"  20 8\"  20 8 in 20 x     Side lying hip abduction    20                Ther Activity                             Gait Training                            Modalities         CP                                      "

## 2024-04-19 ENCOUNTER — OFFICE VISIT (OUTPATIENT)
Dept: GASTROENTEROLOGY | Facility: CLINIC | Age: 69
End: 2024-04-19

## 2024-04-19 VITALS
SYSTOLIC BLOOD PRESSURE: 112 MMHG | HEIGHT: 65 IN | DIASTOLIC BLOOD PRESSURE: 70 MMHG | TEMPERATURE: 98 F | BODY MASS INDEX: 27.82 KG/M2 | WEIGHT: 167 LBS

## 2024-04-19 DIAGNOSIS — K21.9 GASTROESOPHAGEAL REFLUX DISEASE WITHOUT ESOPHAGITIS: Primary | ICD-10-CM

## 2024-04-19 DIAGNOSIS — K44.9 HIATAL HERNIA: ICD-10-CM

## 2024-04-19 DIAGNOSIS — K58.0 IRRITABLE BOWEL SYNDROME WITH DIARRHEA: ICD-10-CM

## 2024-04-19 PROBLEM — R79.89 ELEVATED LFTS: Status: RESOLVED | Noted: 2023-11-14 | Resolved: 2024-04-19

## 2024-04-19 RX ORDER — FAMOTIDINE 20 MG/1
20 TABLET, FILM COATED ORAL DAILY PRN
Qty: 90 TABLET | Refills: 3 | Status: SHIPPED | OUTPATIENT
Start: 2024-04-19

## 2024-04-19 RX ORDER — LOPERAMIDE HYDROCHLORIDE 2 MG/1
2 CAPSULE ORAL 4 TIMES DAILY PRN
Qty: 120 CAPSULE | Refills: 0 | Status: SHIPPED | OUTPATIENT
Start: 2024-04-19

## 2024-04-19 NOTE — PROGRESS NOTES
[facility] - Outpatient Follow-up Note  Ina Tolbert 68 y.o. female MRN: [id number]  Encounter: [id number]      ASSESSMENT AND PLAN:  68 year old female here for follow up.  She has longstanding irritable bowel syndrome and had an acute event in May after eating raw chicken where she presented to the hospital with bloody diarrhea.  Her stool culture was positive for Campylobacter enteritis.  She had a colonoscopy as well with TI ulceration showing acute ileitis.    She was started on Remicade but had complications of this with elevated LFTs and other perceived side effects of this.  She was started on mesalamine which she is currently on.    She came to see me today for an opinion on what medicine she should be continued on or what her options are.  Upon full review of this case I do not think she has Crohn's disease.  She has longstanding irritable bowel syndrome and now likely has a postinfectious exacerbation of her irritable bowel syndrome given bacterial infection with Campylobacter.  She had an acute event with an infectious enteritis that likely caused all of her bloody diarrhea.  Her biopsies are not suggestive of any evidence of chronicity and were more acute in nature which goes against IBD.    I have advised the patient to stop mesalamine and I will dc her remicade infusion order (she is no longer getting it currently).    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  I have advised her to go for timed stooling before seeing her clients and to use imodium as needed.      In regards to her GERD, I will give her Pepcid to take as needed in addition to her daily esomeprazole.  She can avoid her food triggers.    She can follow-up in a few months time   __________________________________    SUBJECTIVE:      68-year-old female here for follow-up.  She is new to  me but she has been followed in our [facility] location for years.  She reports longstanding GI symptoms with urgency to have a bowel movement at times as well as intermittent constipation.  Sounds like she has had longstanding irritable bowel syndrome mixed predominant.  She reports an acute illness in May where her symptoms got acutely worse after eating chicken that she thinks was not cooked well enough.  She had associated abdominal cramping and bloody diarrhea for 4 days at home before seeking healthcare.  At the hospital her C diff was negative, a fecal calprotectin was elevated and a stool enteric panel that was positive for Campylobacter enteritis.  She had an EGD and colonoscopy with biopsies negative for H. pylori and celiac disease.      Her colonoscopy revealed an ulcer in the terminal ileum with biopsies revealing acute ileitis.  Her CT scan at that time of her acute illness on May 1, 2023 showed mild mucosal enhancement involving the terminal ileum with differential including infectious enteritis or inflammatory bowel disease.  A subsequent CT small bowel enterography performed in June 2023 revealed no evidence of active inflammatory bowel disease.  A CAT scan October 2023 with IV contrast was normal.  A CT enterography in November 2023 showed no evidence of active small bowel disease however mild sigmoid wall thickening.    Based on all of this she was starting on Remicade which she stopped   She reports some urgency to have a BM and reports only having 10 minutes to have a BM.  Reports having a big meal makes her BM's come through.  Reports intermittent discomfort.    She was treated with prednisone initially and then was started on Remicade however she had side effects of hair loss, bruising, myalgias and elevated LFTs and she stopped this.  She was neck started on mesalamine but this was held for a knee surgery.     She is currently back on mesalamine 4.8 g daily.    Her LFTs have normalized and her  CRP is normal.  Fecal calprotectin came down from 400-200.    Her only complaint to me currently is she is starting a new job and she is concerned as she sometimes have urgency to have a bowel movement.  She can usually control herself but sometimes cannot.    Has never been on NSAIDS.  Takes acetaminophen as needed.                REVIEW OF SYSTEMS IS OTHERWISE NEGATIVE.      Historical Information   Past Medical History:   Diagnosis Date   • Abdominal adhesions    • Alcoholism (HCC) 1996    in remission   • Anesthesia complication     difficult to wake up   • Anxiety    • Arthritis    • Asthma     with exertion   • Cancer (HCC)     melanoma   • Colon polyp    • Crohn's disease (HCC)    • Depression    • Depression    • Disease of thyroid gland    • Fibromyalgia    • Fibromyalgia, primary    • Fractures 2006   • GERD (gastroesophageal reflux disease)    • History of cholecystectomy 09/07/2019   • Hyperlipidemia    • Infected tooth 01/2024    Tooth #14-was treated with antibiotics-endodontal appt 2/1 may need root canal   • Irregular heart beat     can be tachy; also has orthoststic hypotension   • Irritable bowel syndrome 1990   • Lazy eye     resolved: 3/27/17   • Medical clearance for psychiatric admission 07/13/2021   • Melanoma (HCC) 2010   • Mild asthma 11/04/2020   • Osteoarthritis 07/2018   • Osteopenia     with joint pain-elevated DEV   • Polymyalgia ([facility])    • Psychiatric disorder    • Shortness of breath     assoc with tachycardia   • Stomach disorder 1995   • Tinnitus    • Wears glasses     for reading     Past Surgical History:   Procedure Laterality Date   • ABDOMINAL SURGERY      lysis of adhesions x 2   • APPENDECTOMY     • CERVICAL FUSION      May 5,2021 and Jan. 01,2020   • CHOLECYSTECTOMY      open   • COLONOSCOPY     • DILATION AND CURETTAGE OF UTERUS     • FOOT SURGERY  05/2017   • NECK SURGERY  01/2019 5/2021   • NEUROMA EXCISION Right 05/26/2017    Procedure: EXCISION MASS / FIBROMA FOOT;   Surgeon: Jordne Crespo DPM;  Location: [facility] MAIN OR;  Service:    • PARS PLANA VITRECTOMY W/ REPAIR OF MACULAR HOLE  12/23/2020   • LA ARTHRP KNE CONDYLE&PLATU MEDIAL&LAT COMPARTMENTS Right 2/6/2024    Procedure: ARTHROPLASTY KNEE TOTAL W ROBOT - RIGHT - PRESS FIT - OVERNIGHT;  Surgeon: Jairon Kim DO;  Location: [facility];  Service: Orthopedics   • LA XCAPSL CTRC RMVL INSJ IO LENS PROSTH W/O ECP Left 12/06/2021    Procedure: EXTRACTION EXTRACAPSULAR CATARACT PHACO INTRAOCULAR LENS (IOL);  Surgeon: Mandeep Chavez MD;  Location: [facility] MAIN OR;  Service: Ophthalmology   • RIGHT OOPHORECTOMY     • UPPER GASTROINTESTINAL ENDOSCOPY  5/2019   • WISDOM TOOTH EXTRACTION      x4     Social History   Social History     Substance and Sexual Activity   Alcohol Use Not Currently     Social History     Substance and Sexual Activity   Drug Use No     Social History     Tobacco Use   Smoking Status Never   • Passive exposure: Never   Smokeless Tobacco Never     Family History   Problem Relation Age of Onset   • Heart disease Mother    • Stroke Mother 62   • COPD Mother    • Arthritis Mother    • Asthma Mother    • Hypertension Father    • Dislocations Sister    • Multiple sclerosis Sister    • Neurological problems Sister    • Scoliosis Sister    • Depression Sister    • Mental illness Sister    • No Known Problems Maternal Grandmother    • No Known Problems Maternal Grandfather    • No Known Problems Paternal Grandmother    • No Known Problems Paternal Grandfather    • Kidney disease Brother         kidney transplant   • No Known Problems Maternal Aunt    • No Known Problems Maternal Uncle    • No Known Problems Paternal Aunt    • No Known Problems Paternal Uncle    • ADD / ADHD Neg Hx    • Anesthesia problems Neg Hx    • Cancer Neg Hx    • Clotting disorder Neg Hx    • Collagen disease Neg Hx    • Diabetes Neg Hx    • Dislocations Neg Hx    • Learning disabilities Neg Hx    • Neurological problems Neg Hx    • Osteoporosis Neg  "Hx    • Rheumatologic disease Neg Hx    • Scoliosis Neg Hx    • Vascular Disease Neg Hx        Meds/Allergies       Current Outpatient Medications:   •  albuterol (ProAir HFA) 90 mcg/act inhaler  •  amoxicillin (AMOXIL) 500 MG tablet  •  Calcium Carb-Cholecalciferol 600-12.5 MG-MCG CAPS  •  docusate sodium (COLACE) 100 mg capsule  •  DULoxetine (CYMBALTA) 20 mg capsule  •  DULoxetine (CYMBALTA) 60 mg delayed release capsule  •  esomeprazole (NexIUM) 40 MG capsule  •  famotidine (PEPCID) 20 mg tablet  •  gabapentin (NEURONTIN) 300 mg capsule  •  levothyroxine 50 mcg tablet  •  loperamide (IMODIUM) 2 mg capsule  •  mesalamine (LIALDA) 1.2 g EC tablet  •  QUEtiapine (SEROquel) 100 mg tablet  •  Sodium Fluoride 5000 PPM 1.1 % PSTE  •  Magnesium 400 MG TABS  •  midodrine (PROAMATINE) 10 MG tablet  •  predniSONE 10 mg tablet    Allergies   Allergen Reactions   • Meperidine Lightheadedness and Other (See Comments)   • Sulfa Antibiotics Hives           Objective     Blood pressure 112/70, temperature 98 °F (36.7 °C), temperature source Tympanic, height 5' 5\" (1.651 m), weight 75.8 kg (167 lb). Body mass index is 27.79 kg/m².      PHYSICAL EXAM:      General Appearance:   Alert, cooperative, no distress   HEENT:   Normocephalic, atraumatic, anicteric.     Neck:  Supple, symmetrical, trachea midline   Lungs:   Clear to auscultation bilaterally; no rales, rhonchi or wheezing; respirations unlabored    Heart::   Regular rate and rhythm; no murmur, rub, or gallop.   Abdomen:   Soft, non-tender, non-distended; normal bowel sounds; no masses, no organomegaly    Genitalia:   Deferred    Rectal:   Deferred    Extremities:  No cyanosis, clubbing or edema    Pulses:  2+ and symmetric    Skin:  No jaundice, rashes, or lesions    Lymph nodes:  No palpable cervical lymphadenopathy        Lab Results:     Fibrosis-4 (FIB-4) Score is: 1.36    Indication for testing Absence of advanced fibrosis Presence of advanced fibrosis Indeterminate " result   NAFLD <2.00 >2.67 2.00-2.67   Hepatitis C <1.45 >3.25 1.45-3.25   Hepatitis B <1.00 >2.65 1.00-2.65     FIB-4 Score Component Values:  Component Value Date   Age: 68 y.o.     AST: 34 U/L 1/18/2024   Platelet: 284 Thousands/uL 2/7/2024   ALT: 35 U/L 1/18/2024       NAFLD Fibrosis Score is: -2.02    NAFLD Score Correlated Fibrosis Severity   <0.12 F0-F2   0.12-0.676 Indeterminate Score   >0.676 F3-F4   **Fibrosis Severity Scale: F0 = no fibrosis; F1= mild fibrosis; F2 = moderate fibrosis; F3 = severe fibrosis; F4 = cirrhosis    NAFLD Score Component Values:  Component Value Date   Age: 68 y.o.     BMI: 27.79 kg/m²    IFG or DM: No    AST: 34 U/L 1/18/2024   ALT: 35 U/L 1/18/2024   Platelet: 284 Thousands/uL 2/7/2024   Albumin: 4.1 g/dL 1/18/2024      No visits with results within 1 Day(s) from this visit.   Latest known visit with results is:   Orders Only on 03/18/2024   Component Date Value   • MPO AB 03/18/2024 <0.2    • UT-3 AB 03/18/2024 <0.2    • C-ANCA 03/18/2024 <1:20    • P-ANCA 03/18/2024 <1:20    • Atypical pANCA 03/18/2024 <1:20    • C-Reactive Protein, Quant 03/18/2024 2    • Antinuclear Antibodies, * 03/18/2024 Positive (A)    • Homogeneous Pattern 03/18/2024 1:160 (H)    • Note: 03/18/2024 Comment          Radiology Results:   No results found.     Multiple CT scans reviewed

## 2024-04-22 ENCOUNTER — OFFICE VISIT (OUTPATIENT)
Dept: PSYCHIATRY | Facility: CLINIC | Age: 69
End: 2024-04-22
Payer: COMMERCIAL

## 2024-04-22 VITALS
BODY MASS INDEX: 27.99 KG/M2 | HEIGHT: 65 IN | HEART RATE: 94 BPM | WEIGHT: 168 LBS | SYSTOLIC BLOOD PRESSURE: 150 MMHG | DIASTOLIC BLOOD PRESSURE: 95 MMHG

## 2024-04-22 DIAGNOSIS — F33.42 RECURRENT MAJOR DEPRESSIVE DISORDER, IN FULL REMISSION (HCC): Primary | ICD-10-CM

## 2024-04-22 DIAGNOSIS — F51.05 INSOMNIA RELATED TO ANOTHER MENTAL DISORDER: ICD-10-CM

## 2024-04-22 DIAGNOSIS — F41.1 GENERALIZED ANXIETY DISORDER: ICD-10-CM

## 2024-04-22 DIAGNOSIS — F33.41 MAJOR DEPRESSIVE DISORDER, RECURRENT, IN PARTIAL REMISSION (HCC): ICD-10-CM

## 2024-04-22 DIAGNOSIS — Z63.0 MARITAL CONFLICT: ICD-10-CM

## 2024-04-22 DIAGNOSIS — F63.0 GAMBLING DISORDER, MODERATE: ICD-10-CM

## 2024-04-22 DIAGNOSIS — F43.10 PTSD (POST-TRAUMATIC STRESS DISORDER): Chronic | ICD-10-CM

## 2024-04-22 PROCEDURE — 99214 OFFICE O/P EST MOD 30 MIN: CPT | Performed by: NURSE PRACTITIONER

## 2024-04-22 PROCEDURE — 90833 PSYTX W PT W E/M 30 MIN: CPT | Performed by: NURSE PRACTITIONER

## 2024-04-22 SDOH — SOCIAL STABILITY - SOCIAL INSECURITY: PROBLEMS IN RELATIONSHIP WITH SPOUSE OR PARTNER: Z63.0

## 2024-04-23 NOTE — PSYCH
Visit Time  This note was not shared with the patient due to this is a psychotherapy note  Visit Start Time: 3:00  Visit Stop Time: 3:30  Total Visit Duration:  30 minutes    Subjective:     Patient ID: Ina Tolbert is a 68 y.o. female.  History of PTSD, anxiety, depression, insomnia, gambling seen for medication management and mood assessment.  Ina reports medication helps her mood and coping, would like to stop extra Cymbalta 20 mg.  She is sleeping and eating well.  Slowly recovering from knee surgery and reports doing well.  Source of anxiety and depression is described as  recently diagnosed with throat cancer which spread to his neck lymph nodes.  They are optimistic that radiation well nightly the cancer.  She reports having a new job, mother who is 89 has lung cancer and she is concerned about this.  She attends therapy every other week or feels it is helping her.  ELLY-7 score of 4 indicates minimal anxiety, PHQ-9 score of 1 indicates no depression.  Aims is negative.  Takes medication as prescribed.  Denies gambling.  Family are supportive.    HPI ROS Appetite Changes and Sleep: normal appetite and normal energy level    Review Of Systems:     Mood Anxiety and Depression minimal to mild situational   Behavior Normal    Thought Content Normal   General Relationship Problems, Emotional Problems, and Sleep Disturbances   Personality Normal   Other Psych Symptoms Normal   Constitutional Normal   ENT As Noted in HPI   Cardiovascular As Noted in HPI   Respiratory As Noted in HPI   Gastrointestinal As Noted in HPI   Genitourinary As Noted in HPI   Musculoskeletal As Noted in HPI   Integumentary As Noted in HPI   Neurological As Noted in HPI   Endocrine Hypothyroid   Other Symptoms Normal        Laboratory Results:     Substance Abuse History:  Social History     Substance and Sexual Activity   Drug Use No       Family Psychiatric History:   Family History   Problem Relation Age of Onset    Heart  disease Mother     Stroke Mother 62    COPD Mother     Arthritis Mother     Asthma Mother     Hypertension Father     Dislocations Sister     Multiple sclerosis Sister     Neurological problems Sister     Scoliosis Sister     Depression Sister     Mental illness Sister     No Known Problems Maternal Grandmother     No Known Problems Maternal Grandfather     No Known Problems Paternal Grandmother     No Known Problems Paternal Grandfather     Kidney disease Brother         kidney transplant    No Known Problems Maternal Aunt     No Known Problems Maternal Uncle     No Known Problems Paternal Aunt     No Known Problems Paternal Uncle     ADD / ADHD Neg Hx     Anesthesia problems Neg Hx     Cancer Neg Hx     Clotting disorder Neg Hx     Collagen disease Neg Hx     Diabetes Neg Hx     Dislocations Neg Hx     Learning disabilities Neg Hx     Neurological problems Neg Hx     Osteoporosis Neg Hx     Rheumatologic disease Neg Hx     Scoliosis Neg Hx     Vascular Disease Neg Hx        The following portions of the patient's history were reviewed and updated as appropriate: allergies, current medications, past family history, past medical history, past social history, past surgical history, and problem list.    Social History     Socioeconomic History    Marital status: /Civil Union     Spouse name: Not on file    Number of children: Not on file    Years of education: Not on file    Highest education level: Not on file   Occupational History    Not on file   Tobacco Use    Smoking status: Never     Passive exposure: Never    Smokeless tobacco: Never   Vaping Use    Vaping status: Never Used   Substance and Sexual Activity    Alcohol use: Not Currently    Drug use: No    Sexual activity: Not Currently     Partners: Male   Other Topics Concern    Not on file   Social History Narrative    Not on file     Social Determinants of Health     Financial Resource Strain: Not on file   Food Insecurity: No Food Insecurity  (11/14/2023)    Hunger Vital Sign     Worried About Running Out of Food in the Last Year: Never true     Ran Out of Food in the Last Year: Never true   Transportation Needs: No Transportation Needs (11/14/2023)    PRAPARE - Transportation     Lack of Transportation (Medical): No     Lack of Transportation (Non-Medical): No   Physical Activity: Not on file   Stress: Not on file   Social Connections: Not on file   Intimate Partner Violence: Not on file   Housing Stability: Low Risk  (11/14/2023)    Housing Stability Vital Sign     Unable to Pay for Housing in the Last Year: No     Number of Places Lived in the Last Year: 1     Unstable Housing in the Last Year: No     Social History     Social History Narrative    Not on file       Objective:       Mental status:  Appearance calm and cooperative , adequate hygiene and grooming, and good eye contact    Mood anxious   Affect affect appropriate    Speech a normal rate   Thought Processes normal thought processes   Hallucinations no hallucinations present    Thought Content no delusions   Abnormal Thoughts no suicidal thoughts  and no homicidal thoughts    Orientation  oriented to person and place and time   Remote Memory short term memory intact and long term memory intact   Attention Span concentration intact   Intellect Appears to be of Average Intelligence   Insight Insight intact   Judgement judgment was intact   Muscle Strength Muscle strength and tone were normal   Language no difficulty naming common objects   Fund of Knowledge displays adequate knowledge of current events   Pain moderate to severe   Pain Scale 4       Assessment/Plan:       Diagnoses and all orders for this visit:    Recurrent major depressive disorder, in full remission (HCC)    PTSD (post-traumatic stress disorder)    Major depressive disorder, recurrent, in partial remission (HCC)    Insomnia related to another mental disorder    Generalized anxiety disorder    Gambling disorder,  moderate    Marital conflict            Treatment Recommendations- Risks Benefits      Immediate Medical/Psychiatric/Psychotherapy Treatments and Any Precautions: Continue treatment plan discontinue Cymbalta 20 mg    Risks, Benefits And Possible Side Effects Of Medications:  {PSYCH RISK, BENEFITS AND POSSIBLE SIDE EFFECTS (Optional):52930    Controlled Medication Discussion: She is aware of safe use and storage of medication    Psychotherapy Provided: 30 minutes individual psychotherapy provided.   Supportive therapy  Medication evaluation  Mood assessment  Survey reviewed and results confirmed  Aims negative  Coping discussed regarding illnesses in the family    Goals discussed in session: Continued stable mood

## 2024-04-26 ENCOUNTER — OFFICE VISIT (OUTPATIENT)
Dept: PHYSICAL THERAPY | Facility: CLINIC | Age: 69
End: 2024-04-26
Payer: COMMERCIAL

## 2024-04-26 DIAGNOSIS — M17.11 PRIMARY OSTEOARTHRITIS OF RIGHT KNEE: ICD-10-CM

## 2024-04-26 DIAGNOSIS — G89.29 CHRONIC PAIN OF RIGHT KNEE: Primary | ICD-10-CM

## 2024-04-26 DIAGNOSIS — M25.561 CHRONIC PAIN OF RIGHT KNEE: Primary | ICD-10-CM

## 2024-04-26 PROCEDURE — 97112 NEUROMUSCULAR REEDUCATION: CPT | Performed by: PHYSICAL THERAPIST

## 2024-04-26 PROCEDURE — 97110 THERAPEUTIC EXERCISES: CPT | Performed by: PHYSICAL THERAPIST

## 2024-04-26 NOTE — PROGRESS NOTES
"        Daily Note         Today's date: 2024  Patient name: Ina Tolbert  : 1955  MRN: [id number]  Referring provider: Jairon Kim DO  Dx:   Encounter Diagnosis     ICD-10-CM    1. Chronic pain of right knee  M25.561     G89.29       2. Primary osteoarthritis of right knee  M17.11           Subjective: Ina Tolbert reports no pain and feeling 95% better overall.  She feels ready for discharge       Objective: See treatment diary below.  See ROM in flow sheet.  MMT = 4+/5 for flex and ext right knee.    Assessment: She has achieved all PT and personal goals.  She feels independent with HEP and has no functional complaints    Plan:  Discharge at this time              Eval/ Re-eval Auth #/ Referral # Total visits Start date  Expiration date Total active units  Total manual units  PT only or  PT+OT?   1/29/24                                                                1/29 2/9 2/12 2/14 2/16 2/19 2/21 2/23 2/27  3/1  3  3   Total units authorized  1/12 2/12 3/12 4/12 5/12 6/12 7/12 8/12 9/12 MORE AUTH     Total visits remaining  11 10 9 8 7 6 5 4 3  2  1  0          3/12 3/19 3/26 3/29 4/9 4/18 4/26        Total units authorized  1/12 2/12 3/12 4/12 5/12 6/12 7/12        Total visits remaining  11 10 9 8 7 6 5             Precautions:  Asthma        Specialty Daily Treatment Diary      Manuals 3/26 3/29/24 4/9/24 4/18/24 4/26/24   Visit # 15 16 11 12 13   PF mobs 1 min       Stretch HS Quad 5 min 4 min 4 min 4 min  4 min   Knee PROM 5 min 4 min 4 min 4 min  4 min            Flex AROM 120 deg  126 deg 125 deg 125 deg 125 deg    Ext AROM 0 deg 0 deg 0 deg 0 deg 0 deg            Warm-up        NuStep  Rec bike 8 min 8 min lev 5 Lv 5 8 min  8 min bike   Neuro Re-Ed        Qset 30 30  W/ half roll: 30 x    SAQ  30   2# 30  4# 4 lb 30 x     SLR 20  30 30 30 x 30   SLS on foam SLS no foam  10x 5 sec hold no UE support   SLS on foam  20x  5s SLS  20  3\" 3 sec x 20  30           Ther Ex    " "    Knee PROM        Mini squat 10 2x5 w breathing b/t reps 30 w breathing 15 x 2 with breathing 30   Side step        Knee flex 30 2# 30   2# 30  4# 4 lb: 30 x 4#  30   LAQ 3x10 3.5# 30   2# 30  4# 4 lb: 30 x  4#  30   Heel slides 30x green strap over pressure  30  pnut 30 pnut 30 x pnut 30   Leg press 85# 45 85#  45 95#  45x 95 lb 15 x 3 85#   30   Step ups 6\" 30x 6\"  20 8\"  20 8 in 20 x  8\"  20    Side lying hip abduction  20                Ther Activity                          Gait Training                         Modalities        CP                                    "

## 2024-04-26 NOTE — PROGRESS NOTES
PT Discharge    Patient has done well with PT.  She feels 95% better overall. Patient has achieved all goals at this time and will continue to perform HEP independently.  D/C at this time.

## 2024-05-01 ENCOUNTER — RA CDI HCC (OUTPATIENT)
Dept: OTHER | Facility: HOSPITAL | Age: 69
End: 2024-05-01

## 2024-05-01 NOTE — PROGRESS NOTES
HCC coding opportunities     **cancer c80.1 audit      Chart reviewed, no opportunity found: CHART REVIEWED, NO OPPORTUNITY FOUND        Patients Insurance

## 2024-05-03 ENCOUNTER — TELEPHONE (OUTPATIENT)
Dept: PSYCHIATRY | Facility: CLINIC | Age: 69
End: 2024-05-03

## 2024-05-03 NOTE — TELEPHONE ENCOUNTER
Left voicemail for patient to inform them that their appointment for 06/25/24 at 5 pm was cancelled due to the provider being out of office.     Patient was rescheduled: YES [] NO []  If yes, when was patient rescheduled for:   If no, when is the patient's next appointment:     Patient requesting call back to reschedule: YES [] NO []

## 2024-05-06 ENCOUNTER — TELEPHONE (OUTPATIENT)
Dept: NEUROLOGY | Facility: CLINIC | Age: 69
End: 2024-05-06

## 2024-05-06 NOTE — TELEPHONE ENCOUNTER
I called Patient to confirm her 05/20 appointment however, Patient needs to cancel the appointment due to starting a new job and will call back when Patient has accrued more time at her new position.

## 2024-06-03 DIAGNOSIS — K21.9 GASTROESOPHAGEAL REFLUX DISEASE, UNSPECIFIED WHETHER ESOPHAGITIS PRESENT: ICD-10-CM

## 2024-06-03 DIAGNOSIS — R13.14 PHARYNGOESOPHAGEAL DYSPHAGIA: ICD-10-CM

## 2024-06-03 RX ORDER — ESOMEPRAZOLE MAGNESIUM 40 MG/1
40 CAPSULE, DELAYED RELEASE ORAL
Qty: 90 CAPSULE | Refills: 1 | Status: SHIPPED | OUTPATIENT
Start: 2024-06-03

## 2024-06-12 ENCOUNTER — APPOINTMENT (OUTPATIENT)
Dept: RADIOLOGY | Facility: CLINIC | Age: 69
End: 2024-06-12
Payer: COMMERCIAL

## 2024-06-12 ENCOUNTER — OFFICE VISIT (OUTPATIENT)
Dept: OBGYN CLINIC | Facility: CLINIC | Age: 69
End: 2024-06-12
Payer: COMMERCIAL

## 2024-06-12 VITALS
HEIGHT: 65 IN | BODY MASS INDEX: 27.99 KG/M2 | WEIGHT: 168 LBS | SYSTOLIC BLOOD PRESSURE: 133 MMHG | DIASTOLIC BLOOD PRESSURE: 83 MMHG | HEART RATE: 102 BPM

## 2024-06-12 DIAGNOSIS — Z96.651 S/P TOTAL KNEE REPLACEMENT NOT USING CEMENT, RIGHT: ICD-10-CM

## 2024-06-12 DIAGNOSIS — Z47.1 AFTERCARE FOLLOWING RIGHT KNEE JOINT REPLACEMENT SURGERY: ICD-10-CM

## 2024-06-12 DIAGNOSIS — Z96.651 S/P TOTAL KNEE REPLACEMENT NOT USING CEMENT, RIGHT: Primary | ICD-10-CM

## 2024-06-12 DIAGNOSIS — Z96.651 AFTERCARE FOLLOWING RIGHT KNEE JOINT REPLACEMENT SURGERY: ICD-10-CM

## 2024-06-12 PROCEDURE — 73562 X-RAY EXAM OF KNEE 3: CPT

## 2024-06-12 PROCEDURE — 99214 OFFICE O/P EST MOD 30 MIN: CPT | Performed by: ORTHOPAEDIC SURGERY

## 2024-06-12 RX ORDER — MIDODRINE HYDROCHLORIDE 5 MG/1
5 TABLET ORAL DAILY
COMMUNITY
Start: 2024-04-29

## 2024-06-12 NOTE — PROGRESS NOTES
Assessment/Plan:  1. S/P total knee replacement not using cement, right  XR knee 3 vw right non injury        Scribe Attestation      I,:  Robles Eid am acting as a scribe while in the presence of the attending physician.:       I,:  Jairon Kim, DO personally performed the services described in this documentation    as scribed in my presence.:           Ina upon examination and review of the x-rays of the right knee demonstrates the appropriate position stable appearing right total knee arthroplasty.  She may continue with activities of daily living as tolerated no specific restrictions.  I did advise that she is to have predental antibiotics and is to allow approxiforty 8 hours prior to any procedure to have the medication sent.  I did encourage her to continue her home exercises to work on strengthening of her right lower extremity.  I did explain that she will continue to mature and heal over the next 10 months.  She verbalized understanding had no further questions.  I will see her back in 9 months for her 1 year postop visit.  X-rays will be obtained upon arrival.        Subjective: Post operative evaluation right knee    Patient ID: Ina Tolbert is a pleasant 68 y.o. female presenting for postoperative evaluation of her right knee.  She is 4 months status post robotically assisted total knee arthroplasty.  Date of surgery February 6, 2024.  She states that she is doing quite well and is progressing with her overall function and pain.  She states that she does have some weakness that does cause some unsteadiness particularly on uneven surfaces.  She does also have complaints of stiffness if she is seated for prolonged period of time, these do typically improve as she continues to ambulate.  Despite some of the limitations she notes that her quality life is significantly improved.  Overall she is very happy with her progress up to this point.    Review of Systems   Constitutional:  Negative for  chills, fever and unexpected weight change.   HENT:  Negative for hearing loss, nosebleeds and sore throat.    Eyes:  Negative for pain, redness and visual disturbance.   Respiratory:  Negative for cough, shortness of breath and wheezing.    Cardiovascular:  Negative for chest pain, palpitations and leg swelling.   Gastrointestinal:  Negative for abdominal pain, nausea and vomiting.   Endocrine: Negative for polydipsia and polyuria.   Genitourinary:  Negative for dysuria and hematuria.   Musculoskeletal:  Positive for arthralgias and myalgias.        See HPI   Skin:  Negative for rash and wound.   Neurological:  Negative for dizziness, numbness and headaches.   Psychiatric/Behavioral:  Negative for decreased concentration and suicidal ideas. The patient is not nervous/anxious.          Past Medical History:   Diagnosis Date    Abdominal adhesions     Alcoholism ([facility]) 1996    in remission    Anesthesia complication     difficult to wake up    Anxiety     Arthritis     Asthma     with exertion    Cancer ([facility])     melanoma    Colon polyp     Crohn's disease ([facility])     Depression     Depression     Disease of thyroid gland     Fibromyalgia     Fibromyalgia, primary     Fractures 2006    GERD (gastroesophageal reflux disease)     History of cholecystectomy 09/07/2019    Hyperlipidemia     Infected tooth 01/2024    Tooth #14-was treated with antibiotics-endodontal appt 2/1 may need root canal    Irregular heart beat     can be tachy; also has orthoststic hypotension    Irritable bowel syndrome 1990    Lazy eye     resolved: 3/27/17    Medical clearance for psychiatric admission 07/13/2021    Melanoma ([facility]) 2010    Mild asthma 11/04/2020    Osteoarthritis 07/2018    Osteopenia     with joint pain-elevated DEV    Polymyalgia ([facility])     Psychiatric disorder     Shortness of breath     assoc with tachycardia    Stomach disorder 1995    Tinnitus     Wears glasses     for reading       Past Surgical History:   Procedure  Laterality Date    ABDOMINAL SURGERY      lysis of adhesions x 2    APPENDECTOMY      CERVICAL FUSION      May 5,2021 and Jan. 01,2020    CHOLECYSTECTOMY      open    COLONOSCOPY      DILATION AND CURETTAGE OF UTERUS      FOOT SURGERY  05/2017    NECK SURGERY  01/2019 5/2021    NEUROMA EXCISION Right 05/26/2017    Procedure: EXCISION MASS / FIBROMA FOOT;  Surgeon: Jorden Crespo DPM;  Location: [facility];  Service:     PARS PLANA VITRECTOMY W/ REPAIR OF MACULAR HOLE  12/23/2020    NM ARTHRP KNE CONDYLE&PLATU MEDIAL&LAT COMPARTMENTS Right 2/6/2024    Procedure: ARTHROPLASTY KNEE TOTAL W ROBOT - RIGHT - PRESS FIT - OVERNIGHT;  Surgeon: Jairon Kim DO;  Location: [facility];  Service: Orthopedics    NM XCAPSL CTRC RMVL INSJ IO LENS PROSTH W/O ECP Left 12/06/2021    Procedure: EXTRACTION EXTRACAPSULAR CATARACT PHACO INTRAOCULAR LENS (IOL);  Surgeon: Mandeep Chavez MD;  Location: [facility] MAIN OR;  Service: Ophthalmology    RIGHT OOPHORECTOMY      UPPER GASTROINTESTINAL ENDOSCOPY  5/2019    WISDOM TOOTH EXTRACTION      x4       Family History   Problem Relation Age of Onset    Heart disease Mother     Stroke Mother 62    COPD Mother     Arthritis Mother     Asthma Mother     Hypertension Father     Dislocations Sister     Multiple sclerosis Sister     Neurological problems Sister     Scoliosis Sister     Depression Sister     Mental illness Sister     No Known Problems Maternal Grandmother     No Known Problems Maternal Grandfather     No Known Problems Paternal Grandmother     No Known Problems Paternal Grandfather     Kidney disease Brother         kidney transplant    No Known Problems Maternal Aunt     No Known Problems Maternal Uncle     No Known Problems Paternal Aunt     No Known Problems Paternal Uncle     ADD / ADHD Neg Hx     Anesthesia problems Neg Hx     Cancer Neg Hx     Clotting disorder Neg Hx     Collagen disease Neg Hx     Diabetes Neg Hx     Dislocations Neg Hx     Learning disabilities Neg Hx      Neurological problems Neg Hx     Osteoporosis Neg Hx     Rheumatologic disease Neg Hx     Scoliosis Neg Hx     Vascular Disease Neg Hx        Social History     Occupational History    Not on file   Tobacco Use    Smoking status: Never     Passive exposure: Never    Smokeless tobacco: Never   Vaping Use    Vaping status: Never Used   Substance and Sexual Activity    Alcohol use: Not Currently    Drug use: No    Sexual activity: Not Currently     Partners: Male         Current Outpatient Medications:     albuterol (ProAir HFA) 90 mcg/act inhaler, Inhale 2 puffs every 6 (six) hours as needed for wheezing, Disp: 8.5 g, Rfl: 0    amoxicillin (AMOXIL) 500 MG tablet, Take 4 tablets (2,000 mg total) by mouth 60 minutes pre-procedure, Disp: 8 tablet, Rfl: 2    Calcium Carb-Cholecalciferol 600-12.5 MG-MCG CAPS, Take by mouth daily in the early morning, Disp: , Rfl:     docusate sodium (COLACE) 100 mg capsule, Take 1 capsule (100 mg total) by mouth 2 (two) times a day, Disp: 60 capsule, Rfl: 0    DULoxetine (CYMBALTA) 60 mg delayed release capsule, Take 1 capsule (60 mg total) by mouth daily, Disp: 90 capsule, Rfl: 1    esomeprazole (NexIUM) 40 MG capsule, TAKE 1 CAPSULE BY MOUTH EVERY DAY BEFORE BREAKFAST, Disp: 90 capsule, Rfl: 1    famotidine (PEPCID) 20 mg tablet, Take 1 tablet (20 mg total) by mouth daily as needed for heartburn, Disp: 90 tablet, Rfl: 3    gabapentin (NEURONTIN) 300 mg capsule, Take 1 capsule (300 mg total) by mouth 2 (two) times a day, Disp: 60 capsule, Rfl: 5    levothyroxine 50 mcg tablet, TAKE 1 TABLET BY MOUTH EVERY DAY, Disp: 90 tablet, Rfl: 1    loperamide (IMODIUM) 2 mg capsule, Take 1 capsule (2 mg total) by mouth 4 (four) times a day as needed for diarrhea, Disp: 120 capsule, Rfl: 0    midodrine (PROAMATINE) 5 mg tablet, Take 5 mg by mouth daily, Disp: , Rfl:     QUEtiapine (SEROquel) 100 mg tablet, Take 1 tablet (100 mg total) by mouth daily at bedtime, Disp: 90 tablet, Rfl: 1    Sodium  Fluoride 5000 PPM 1.1 % PSTE, USE ONCE DAILY PREFERABLY AT NIGHT, Disp: , Rfl:     Allergies   Allergen Reactions    Meperidine Lightheadedness and Other (See Comments)    Sulfa Antibiotics Hives       Objective:  Vitals:    06/12/24 1624   BP: 133/83   Pulse: 102       Body mass index is 27.96 kg/m².    Right Knee Exam     Tenderness   The patient is experiencing no tenderness.     Range of Motion   Extension:  0   Flexion:  130     Tests   Varus: negative (stable at 0, 30 and 90) Valgus: negative (stable at 0, 30 and 90)    Other   Erythema: absent  Scars: present (well healed vertical midline incision)  Sensation: normal  Pulse: present  Swelling: none  Effusion: no effusion present          Observations     Right Knee   Negative for effusion.       Physical Exam  Vitals and nursing note reviewed.   Constitutional:       Appearance: She is well-developed.   HENT:      Head: Normocephalic and atraumatic.      Right Ear: External ear normal.      Left Ear: External ear normal.      Nose: Nose normal.   Eyes:      General:         Right eye: No discharge.         Left eye: No discharge.      Conjunctiva/sclera: Conjunctivae normal.   Cardiovascular:      Rate and Rhythm: Normal rate.   Pulmonary:      Effort: Pulmonary effort is normal. No respiratory distress.   Musculoskeletal:      Cervical back: Normal range of motion and neck supple.      Right knee: No effusion.      Comments: As noted in ortho exam   Skin:     General: Skin is warm and dry.   Neurological:      Mental Status: She is alert and oriented to person, place, and time.   Psychiatric:         Behavior: Behavior normal.         Thought Content: Thought content normal.         Judgment: Judgment normal.         I have personally reviewed pertinent films in PACS.      X-rays of the right knee demonstrate a stable appearing and appropriately positioned total knee arthroplasty without evidence of fracture.      This document was created using speech voice  recognition software.   Grammatical errors, random word insertions, pronoun errors, and incomplete sentences are an occasional consequence of this system due to software limitations, ambient noise, and hardware issues.   Any formal questions or concerns about content, text, or information contained within the body of this dictation should be directly addressed to the provider for clarification.

## 2024-06-16 DIAGNOSIS — G62.9 PERIPHERAL NEUROPATHY: ICD-10-CM

## 2024-06-16 DIAGNOSIS — M54.12 CERVICAL MYELOPATHY WITH CERVICAL RADICULOPATHY  (HCC): ICD-10-CM

## 2024-06-16 DIAGNOSIS — G95.9 CERVICAL MYELOPATHY WITH CERVICAL RADICULOPATHY  (HCC): ICD-10-CM

## 2024-06-17 ENCOUNTER — HOSPITAL ENCOUNTER (OUTPATIENT)
Facility: HOSPITAL | Age: 69
Setting detail: OBSERVATION
Discharge: HOME | End: 2024-06-18
Attending: EMERGENCY MEDICINE | Admitting: FAMILY MEDICINE
Payer: COMMERCIAL

## 2024-06-17 ENCOUNTER — APPOINTMENT (EMERGENCY)
Dept: RADIOLOGY | Facility: HOSPITAL | Age: 69
End: 2024-06-17
Payer: COMMERCIAL

## 2024-06-17 ENCOUNTER — OFFICE VISIT (OUTPATIENT)
Dept: URGENT CARE | Facility: CLINIC | Age: 69
End: 2024-06-17
Payer: COMMERCIAL

## 2024-06-17 ENCOUNTER — OFFICE VISIT (OUTPATIENT)
Dept: RHEUMATOLOGY | Facility: CLINIC | Age: 69
End: 2024-06-17
Payer: COMMERCIAL

## 2024-06-17 VITALS
BODY MASS INDEX: 27.76 KG/M2 | SYSTOLIC BLOOD PRESSURE: 124 MMHG | HEIGHT: 65 IN | WEIGHT: 166.6 LBS | DIASTOLIC BLOOD PRESSURE: 70 MMHG | HEART RATE: 88 BPM | TEMPERATURE: 97.8 F | OXYGEN SATURATION: 98 %

## 2024-06-17 VITALS
DIASTOLIC BLOOD PRESSURE: 72 MMHG | WEIGHT: 168 LBS | OXYGEN SATURATION: 95 % | BODY MASS INDEX: 27.96 KG/M2 | TEMPERATURE: 97.2 F | RESPIRATION RATE: 14 BRPM | HEART RATE: 98 BPM | SYSTOLIC BLOOD PRESSURE: 116 MMHG

## 2024-06-17 DIAGNOSIS — E03.9 ADULT HYPOTHYROIDISM: ICD-10-CM

## 2024-06-17 DIAGNOSIS — R29.818 HEMISENSORY DEFICIT: ICD-10-CM

## 2024-06-17 DIAGNOSIS — Z78.0 POST-MENOPAUSAL: ICD-10-CM

## 2024-06-17 DIAGNOSIS — R27.0 ATAXIA: ICD-10-CM

## 2024-06-17 DIAGNOSIS — M85.80 OSTEOPENIA, UNSPECIFIED LOCATION: ICD-10-CM

## 2024-06-17 DIAGNOSIS — R76.8 ANTINEUTROPHIL CYTOPLASMIC ANTIBODY (ANCA) POSITIVE: ICD-10-CM

## 2024-06-17 DIAGNOSIS — Z87.81 HISTORY OF FRACTURE: ICD-10-CM

## 2024-06-17 DIAGNOSIS — R76.8 ANA POSITIVE: Primary | ICD-10-CM

## 2024-06-17 DIAGNOSIS — M15.9 PRIMARY OSTEOARTHRITIS INVOLVING MULTIPLE JOINTS: ICD-10-CM

## 2024-06-17 DIAGNOSIS — R51.9 HEADACHE: Primary | ICD-10-CM

## 2024-06-17 DIAGNOSIS — R51.9 ACUTE NONINTRACTABLE HEADACHE, UNSPECIFIED HEADACHE TYPE: Primary | ICD-10-CM

## 2024-06-17 LAB
ALBUMIN SERPL BCP-MCNC: 3.7 G/DL (ref 3.5–5)
ALP SERPL-CCNC: 66 U/L (ref 34–104)
ALT SERPL W P-5'-P-CCNC: 20 U/L (ref 7–52)
AMPHETAMINES SERPL QL SCN: NEGATIVE
ANION GAP SERPL CALCULATED.3IONS-SCNC: 6 MMOL/L (ref 4–13)
AST SERPL W P-5'-P-CCNC: 21 U/L (ref 13–39)
BARBITURATES UR QL: NEGATIVE
BASOPHILS # BLD AUTO: 0.03 THOUSANDS/ÂΜL (ref 0–0.1)
BASOPHILS NFR BLD AUTO: 1 % (ref 0–1)
BENZODIAZ UR QL: NEGATIVE
BILIRUB SERPL-MCNC: 0.38 MG/DL (ref 0.2–1)
BILIRUB UR QL STRIP: NEGATIVE
BUN SERPL-MCNC: 12 MG/DL (ref 5–25)
CALCIUM SERPL-MCNC: 8.6 MG/DL (ref 8.4–10.2)
CARDIAC TROPONIN I PNL SERPL HS: 3 NG/L
CHLORIDE SERPL-SCNC: 102 MMOL/L (ref 96–108)
CLARITY UR: CLEAR
CO2 SERPL-SCNC: 27 MMOL/L (ref 21–32)
COCAINE UR QL: NEGATIVE
COLOR UR: YELLOW
CREAT SERPL-MCNC: 0.79 MG/DL (ref 0.6–1.3)
EOSINOPHIL # BLD AUTO: 0.12 THOUSAND/ÂΜL (ref 0–0.61)
EOSINOPHIL NFR BLD AUTO: 3 % (ref 0–6)
ERYTHROCYTE [DISTWIDTH] IN BLOOD BY AUTOMATED COUNT: 13.7 % (ref 11.6–15.1)
ETHANOL SERPL-MCNC: <10 MG/DL
FENTANYL UR QL SCN: NEGATIVE
GFR SERPL CREATININE-BSD FRML MDRD: 77 ML/MIN/1.73SQ M
GLUCOSE SERPL-MCNC: 109 MG/DL (ref 65–140)
GLUCOSE UR STRIP-MCNC: NEGATIVE MG/DL
HCT VFR BLD AUTO: 36.3 % (ref 34.8–46.1)
HGB BLD-MCNC: 11.6 G/DL (ref 11.5–15.4)
HGB UR QL STRIP.AUTO: NEGATIVE
HYDROCODONE UR QL SCN: NEGATIVE
IMM GRANULOCYTES # BLD AUTO: 0.02 THOUSAND/UL (ref 0–0.2)
IMM GRANULOCYTES NFR BLD AUTO: 0 % (ref 0–2)
KETONES UR STRIP-MCNC: NEGATIVE MG/DL
LEUKOCYTE ESTERASE UR QL STRIP: NEGATIVE
LYMPHOCYTES # BLD AUTO: 1.21 THOUSANDS/ÂΜL (ref 0.6–4.47)
LYMPHOCYTES NFR BLD AUTO: 25 % (ref 14–44)
MAGNESIUM SERPL-MCNC: 2 MG/DL (ref 1.9–2.7)
MCH RBC QN AUTO: 26.9 PG (ref 26.8–34.3)
MCHC RBC AUTO-ENTMCNC: 32 G/DL (ref 31.4–37.4)
MCV RBC AUTO: 84 FL (ref 82–98)
METHADONE UR QL: NEGATIVE
MONOCYTES # BLD AUTO: 0.54 THOUSAND/ÂΜL (ref 0.17–1.22)
MONOCYTES NFR BLD AUTO: 11 % (ref 4–12)
NEUTROPHILS # BLD AUTO: 2.88 THOUSANDS/ÂΜL (ref 1.85–7.62)
NEUTS SEG NFR BLD AUTO: 60 % (ref 43–75)
NITRITE UR QL STRIP: NEGATIVE
NRBC BLD AUTO-RTO: 0 /100 WBCS
OPIATES UR QL SCN: POSITIVE
OXYCODONE+OXYMORPHONE UR QL SCN: NEGATIVE
PCP UR QL: NEGATIVE
PH UR STRIP.AUTO: 7 [PH]
PLATELET # BLD AUTO: 209 THOUSANDS/UL (ref 149–390)
PMV BLD AUTO: 10.4 FL (ref 8.9–12.7)
POTASSIUM SERPL-SCNC: 3.7 MMOL/L (ref 3.5–5.3)
PROT SERPL-MCNC: 6 G/DL (ref 6.4–8.4)
PROT UR STRIP-MCNC: NEGATIVE MG/DL
RBC # BLD AUTO: 4.32 MILLION/UL (ref 3.81–5.12)
SODIUM SERPL-SCNC: 135 MMOL/L (ref 135–147)
SP GR UR STRIP.AUTO: 1.01 (ref 1–1.03)
THC UR QL: NEGATIVE
UROBILINOGEN UR STRIP-ACNC: <2 MG/DL
WBC # BLD AUTO: 4.8 THOUSAND/UL (ref 4.31–10.16)

## 2024-06-17 PROCEDURE — 82077 ASSAY SPEC XCP UR&BREATH IA: CPT | Performed by: EMERGENCY MEDICINE

## 2024-06-17 PROCEDURE — 36415 COLL VENOUS BLD VENIPUNCTURE: CPT | Performed by: EMERGENCY MEDICINE

## 2024-06-17 PROCEDURE — 99223 1ST HOSP IP/OBS HIGH 75: CPT

## 2024-06-17 PROCEDURE — 96374 THER/PROPH/DIAG INJ IV PUSH: CPT

## 2024-06-17 PROCEDURE — 99285 EMERGENCY DEPT VISIT HI MDM: CPT | Performed by: EMERGENCY MEDICINE

## 2024-06-17 PROCEDURE — 80053 COMPREHEN METABOLIC PANEL: CPT | Performed by: EMERGENCY MEDICINE

## 2024-06-17 PROCEDURE — 80307 DRUG TEST PRSMV CHEM ANLYZR: CPT | Performed by: EMERGENCY MEDICINE

## 2024-06-17 PROCEDURE — 70498 CT ANGIOGRAPHY NECK: CPT

## 2024-06-17 PROCEDURE — 93005 ELECTROCARDIOGRAM TRACING: CPT

## 2024-06-17 PROCEDURE — 84484 ASSAY OF TROPONIN QUANT: CPT | Performed by: EMERGENCY MEDICINE

## 2024-06-17 PROCEDURE — 83735 ASSAY OF MAGNESIUM: CPT | Performed by: EMERGENCY MEDICINE

## 2024-06-17 PROCEDURE — 81003 URINALYSIS AUTO W/O SCOPE: CPT | Performed by: EMERGENCY MEDICINE

## 2024-06-17 PROCEDURE — 70496 CT ANGIOGRAPHY HEAD: CPT

## 2024-06-17 PROCEDURE — 99204 OFFICE O/P NEW MOD 45 MIN: CPT | Performed by: PHYSICIAN ASSISTANT

## 2024-06-17 PROCEDURE — 99285 EMERGENCY DEPT VISIT HI MDM: CPT

## 2024-06-17 PROCEDURE — 85025 COMPLETE CBC W/AUTO DIFF WBC: CPT | Performed by: EMERGENCY MEDICINE

## 2024-06-17 RX ORDER — PANTOPRAZOLE SODIUM 40 MG/1
40 TABLET, DELAYED RELEASE ORAL
Status: DISCONTINUED | OUTPATIENT
Start: 2024-06-18 | End: 2024-06-19 | Stop reason: HOSPADM

## 2024-06-17 RX ORDER — CYCLOBENZAPRINE HCL 10 MG
10 TABLET ORAL EVERY 8 HOURS
Status: DISCONTINUED | OUTPATIENT
Start: 2024-06-17 | End: 2024-06-19 | Stop reason: HOSPADM

## 2024-06-17 RX ORDER — ONDANSETRON 2 MG/ML
4 INJECTION INTRAMUSCULAR; INTRAVENOUS EVERY 6 HOURS PRN
Status: DISCONTINUED | OUTPATIENT
Start: 2024-06-17 | End: 2024-06-19 | Stop reason: HOSPADM

## 2024-06-17 RX ORDER — ENOXAPARIN SODIUM 100 MG/ML
40 INJECTION SUBCUTANEOUS DAILY
Status: DISCONTINUED | OUTPATIENT
Start: 2024-06-18 | End: 2024-06-19 | Stop reason: HOSPADM

## 2024-06-17 RX ORDER — MIDODRINE HYDROCHLORIDE 5 MG/1
5 TABLET ORAL DAILY
Status: DISCONTINUED | OUTPATIENT
Start: 2024-06-18 | End: 2024-06-19 | Stop reason: HOSPADM

## 2024-06-17 RX ORDER — GABAPENTIN 300 MG/1
300 CAPSULE ORAL 2 TIMES DAILY
Qty: 60 CAPSULE | Refills: 2 | Status: SHIPPED | OUTPATIENT
Start: 2024-06-17

## 2024-06-17 RX ORDER — ACETAMINOPHEN 325 MG/1
650 TABLET ORAL EVERY 6 HOURS PRN
Status: DISCONTINUED | OUTPATIENT
Start: 2024-06-17 | End: 2024-06-19 | Stop reason: HOSPADM

## 2024-06-17 RX ORDER — POLYETHYLENE GLYCOL 3350 17 G/17G
17 POWDER, FOR SOLUTION ORAL DAILY
Status: DISCONTINUED | OUTPATIENT
Start: 2024-06-18 | End: 2024-06-19 | Stop reason: HOSPADM

## 2024-06-17 RX ORDER — SODIUM CHLORIDE 9 MG/ML
50 INJECTION, SOLUTION INTRAVENOUS CONTINUOUS
Status: DISCONTINUED | OUTPATIENT
Start: 2024-06-17 | End: 2024-06-19 | Stop reason: HOSPADM

## 2024-06-17 RX ORDER — DEXAMETHASONE SODIUM PHOSPHATE 10 MG/ML
10 INJECTION, SOLUTION INTRAMUSCULAR; INTRAVENOUS ONCE
Status: COMPLETED | OUTPATIENT
Start: 2024-06-17 | End: 2024-06-17

## 2024-06-17 RX ORDER — QUETIAPINE FUMARATE 25 MG/1
100 TABLET, FILM COATED ORAL
Status: DISCONTINUED | OUTPATIENT
Start: 2024-06-17 | End: 2024-06-19 | Stop reason: HOSPADM

## 2024-06-17 RX ORDER — DIPHENHYDRAMINE HYDROCHLORIDE 50 MG/ML
25 INJECTION INTRAMUSCULAR; INTRAVENOUS ONCE
Status: COMPLETED | OUTPATIENT
Start: 2024-06-17 | End: 2024-06-17

## 2024-06-17 RX ORDER — DOCUSATE SODIUM 100 MG/1
100 CAPSULE, LIQUID FILLED ORAL 2 TIMES DAILY
Status: DISCONTINUED | OUTPATIENT
Start: 2024-06-17 | End: 2024-06-19 | Stop reason: HOSPADM

## 2024-06-17 RX ORDER — ALBUTEROL SULFATE 90 UG/1
2 AEROSOL, METERED RESPIRATORY (INHALATION) EVERY 6 HOURS PRN
Status: DISCONTINUED | OUTPATIENT
Start: 2024-06-17 | End: 2024-06-19 | Stop reason: HOSPADM

## 2024-06-17 RX ORDER — MAGNESIUM SULFATE HEPTAHYDRATE 40 MG/ML
2 INJECTION, SOLUTION INTRAVENOUS ONCE
Status: COMPLETED | OUTPATIENT
Start: 2024-06-17 | End: 2024-06-18

## 2024-06-17 RX ORDER — KETOROLAC TROMETHAMINE 30 MG/ML
30 INJECTION, SOLUTION INTRAMUSCULAR; INTRAVENOUS EVERY 8 HOURS SCHEDULED
Status: DISCONTINUED | OUTPATIENT
Start: 2024-06-17 | End: 2024-06-19 | Stop reason: HOSPADM

## 2024-06-17 RX ORDER — LANOLIN ALCOHOL/MO/W.PET/CERES
1 CREAM (GRAM) TOPICAL
Status: DISCONTINUED | OUTPATIENT
Start: 2024-06-18 | End: 2024-06-19 | Stop reason: HOSPADM

## 2024-06-17 RX ORDER — LOPERAMIDE HYDROCHLORIDE 2 MG/1
2 CAPSULE ORAL 4 TIMES DAILY PRN
Status: DISCONTINUED | OUTPATIENT
Start: 2024-06-17 | End: 2024-06-19 | Stop reason: HOSPADM

## 2024-06-17 RX ORDER — METOCLOPRAMIDE HYDROCHLORIDE 5 MG/ML
10 INJECTION INTRAMUSCULAR; INTRAVENOUS ONCE
Status: COMPLETED | OUTPATIENT
Start: 2024-06-17 | End: 2024-06-17

## 2024-06-17 RX ORDER — LEVOTHYROXINE SODIUM 0.05 MG/1
50 TABLET ORAL
Status: DISCONTINUED | OUTPATIENT
Start: 2024-06-18 | End: 2024-06-19 | Stop reason: HOSPADM

## 2024-06-17 RX ORDER — DULOXETIN HYDROCHLORIDE 30 MG/1
60 CAPSULE, DELAYED RELEASE ORAL DAILY
Status: DISCONTINUED | OUTPATIENT
Start: 2024-06-18 | End: 2024-06-19 | Stop reason: HOSPADM

## 2024-06-17 RX ORDER — MORPHINE SULFATE 4 MG/ML
4 INJECTION, SOLUTION INTRAMUSCULAR; INTRAVENOUS ONCE
Status: COMPLETED | OUTPATIENT
Start: 2024-06-17 | End: 2024-06-17

## 2024-06-17 RX ORDER — GABAPENTIN 300 MG/1
300 CAPSULE ORAL 2 TIMES DAILY
Status: DISCONTINUED | OUTPATIENT
Start: 2024-06-17 | End: 2024-06-19 | Stop reason: HOSPADM

## 2024-06-17 RX ORDER — DOCUSATE SODIUM 100 MG/1
100 CAPSULE, LIQUID FILLED ORAL 2 TIMES DAILY
Status: DISCONTINUED | OUTPATIENT
Start: 2024-06-17 | End: 2024-06-17 | Stop reason: SDUPTHER

## 2024-06-17 RX ADMIN — SODIUM CHLORIDE 125 ML/HR: 0.9 INJECTION, SOLUTION INTRAVENOUS at 23:18

## 2024-06-17 RX ADMIN — MORPHINE SULFATE 4 MG: 4 INJECTION INTRAVENOUS at 18:57

## 2024-06-17 RX ADMIN — GABAPENTIN 300 MG: 300 CAPSULE ORAL at 23:23

## 2024-06-17 RX ADMIN — KETOROLAC TROMETHAMINE 30 MG: 30 INJECTION, SOLUTION INTRAMUSCULAR; INTRAVENOUS at 23:24

## 2024-06-17 RX ADMIN — CYCLOBENZAPRINE HYDROCHLORIDE 10 MG: 10 TABLET, FILM COATED ORAL at 23:23

## 2024-06-17 RX ADMIN — MAGNESIUM SULFATE HEPTAHYDRATE 2 G: 40 INJECTION, SOLUTION INTRAVENOUS at 23:10

## 2024-06-17 RX ADMIN — QUETIAPINE FUMARATE 100 MG: 25 TABLET ORAL at 23:23

## 2024-06-17 RX ADMIN — METOCLOPRAMIDE 10 MG: 5 INJECTION, SOLUTION INTRAMUSCULAR; INTRAVENOUS at 21:09

## 2024-06-17 RX ADMIN — IOHEXOL 85 ML: 350 INJECTION, SOLUTION INTRAVENOUS at 18:28

## 2024-06-17 RX ADMIN — DIPHENHYDRAMINE HYDROCHLORIDE 25 MG: 50 INJECTION, SOLUTION INTRAMUSCULAR; INTRAVENOUS at 21:09

## 2024-06-17 RX ADMIN — DEXAMETHASONE SODIUM PHOSPHATE 10 MG: 10 INJECTION, SOLUTION INTRAMUSCULAR; INTRAVENOUS at 21:09

## 2024-06-17 NOTE — Clinical Note
Case was discussed with and the patient's admission status was agreed to be Admission Status: observation status to the service of Dr. Campbell .

## 2024-06-17 NOTE — PROGRESS NOTES
Assessment and Plan:  Ms. Tolbert is a 68-year-old female with past medical history significant for Crohn's disease, osteoporosis, osteoarthritis, Lyme arthritis, cervical spondylosis, herniated cervical disc, malignant melanoma of skin TIA, hypertension, Raynaud's syndrome without gangrene,, GERD, hypothyroidism, IBS, MDD, anxiety, and history of alcohol dependence in remission who presents for rheumatology evaluation of ANCA positive.    Up until November 2023, the patient was following with Dr. Neha Wright of [facility] rheumatology in terms of her history of fibromyalgia and +DEV. Prior to this, she was seeing Dr. Dasilva.  At the time of her last visit with Dr. Wright, it was discussed that she was recently diagnosed with Crohn's disease around the time medication.  Previous workup revealed DEV at a titer of 1:80 with negative subsets.  Since then, she has discontinued Remicade due to hair loss and rashes currently the Raynaud's disease is being reevaluated by GI.    During pulmonology evaluation earlier this year for mild mosaic attenuation noted on CT chest, it was discussed that the patient had positive DEV and ANCA panel.  Upon review, the DEV returned at a titer of 1:160 with negative ANCA panel and CRP of 2.  Due to the fact that DEV was previously negative in November 2023 (and has fluctuated between 1:80 and 1:160 in the past) and serologic workup has been completely unremarkable thus far, though recent DEV is likely a false positive.  She is not having any signs or symptoms concerning for AI disease.  We discussed that I will update CTD activity monitoring labs for completeness sake as well as thyroid antibodies in light of her history of hypothyroidism.  If she potentially has an autoimmune cause to her thyroid disease this could also be causing a false positive DEV test.    Additionally, the patient has a history of osteopenia next scan many years ago.  She has a history of left ankle and left wrist fracture.   Recommend continued calcium and vitamin D supplementation and update DEXA scan next convenience.    I will reach out with these results to determine if rheumatology follow-up is needed.  Recommend continued follow-up with the primary team in terms of fibromyalgia management. Thank you very much for this consultation.    Plan:  Diagnoses and all orders for this visit:    DEV positive  -     Thyroid Antibodies Panel  -     C3 complement  -     C4 complement  -     C-reactive protein  -     Sedimentation rate, automated  -     Urinalysis with microscopic  -     Anti-DNA antibody, double-stranded  -     DXA bone density spine hip and pelvis    Osteopenia, unspecified location  -     DXA bone density spine hip and pelvis    Post-menopausal  -     DXA bone density spine hip and pelvis    History of fracture  -     DXA bone density spine hip and pelvis    Primary osteoarthritis involving multiple joints    Antineutrophil cytoplasmic antibody (ANCA) positive  -     Ambulatory Referral to Rheumatology    Adult hypothyroidism  -     Thyroid Antibodies Panel        I have personally reviewed prior notes, recent laboratory results, and pertinent films in PACS.     Activities as tolerated.   Exercise: try to maintain a low impact exercise regimen as much as possible. Walk for 30 minutes a day for at least 3 days a week.   Continue other medications as prescribed by PCP and other specialists.           Follow-up plan: To be determined        Chief Complaint  No chief complaint on file.      DEBI Tolbert is a 68 y.o.  female who presents as a Rheumatology consult referred by Lesly Pizano CRNP for evaluation of ANCA positive.    The patient reports that she is most concerned about the intermittent shortness of breath that she experiences at times.  She states she also has a diagnosis of asthma and carriers her inhaler.    She also states that the diagnosis of Crohn's disease is being reevaluated at this time and she is not  on any therapies for this.  She reports that when she was on Remicade she experienced hair loss and rashes.  Currently, she typically has constipation and then may experience diarrhea intermittently, but no blood in the stool.    In terms of joint pains, since she had her right knee replaced in February, she has not been experiencing routine joint pains generalized aches and pains at times.  No prolonged morning stiffness or joint swelling.    Recalls left wrist fracture and left ankle fracture.  States she is taking calcium and vitamin D.    + Dry mouth (which she attributes to her medications), constipation > diarrhea, history of Raynaud's phenomenon but has not had this recently    Denies fevers, dry eyes, persistent/recurrent skin rash, photosensitivity, mouth/nose ulcers, swollen glands, pleuritic chest pain, abdominal pain, vomiting, blood in stools, blood clots    Review of Systems  Review of Systems  Constitutional: Negative for weight change, fevers, chills, night sweats  ENT/Mouth: Negative for hearing changes, ear pain, nasal congestion, sinus pain, hoarseness, sore throat, rhinorrhea, swallowing difficulty.   Eyes: Negative for pain, redness, discharge, vision changes.   Cardiovascular: +SOB   Respiratory: Negative for cough, sputum, wheezing, dyspnea.   Gastrointestinal: + Constipation, diarrhea  Genitourinary: Negative for dysuria, urinary frequency, hematuria.   Musculoskeletal: As per HPI.  Skin: + Dry skin.   Neuro: Negative for weakness, numbness, tingling, loss of consciousness.   Psych: +anxiety, depression.   Heme/Lymph: Negative for easy bruising, bleeding, lymphadenopathy.      Allergies  Allergies   Allergen Reactions    Meperidine Lightheadedness and Other (See Comments)    Sulfa Antibiotics Hives       Home Medications    Current Outpatient Medications:     albuterol (ProAir HFA) 90 mcg/act inhaler, Inhale 2 puffs every 6 (six) hours as needed for wheezing, Disp: 8.5 g, Rfl: 0     amoxicillin (AMOXIL) 500 MG tablet, Take 4 tablets (2,000 mg total) by mouth 60 minutes pre-procedure, Disp: 8 tablet, Rfl: 2    Calcium Carb-Cholecalciferol 600-12.5 MG-MCG CAPS, Take by mouth daily in the early morning, Disp: , Rfl:     docusate sodium (COLACE) 100 mg capsule, Take 1 capsule (100 mg total) by mouth 2 (two) times a day, Disp: 60 capsule, Rfl: 0    DULoxetine (CYMBALTA) 60 mg delayed release capsule, Take 1 capsule (60 mg total) by mouth daily, Disp: 90 capsule, Rfl: 1    esomeprazole (NexIUM) 40 MG capsule, TAKE 1 CAPSULE BY MOUTH EVERY DAY BEFORE BREAKFAST, Disp: 90 capsule, Rfl: 1    famotidine (PEPCID) 20 mg tablet, Take 1 tablet (20 mg total) by mouth daily as needed for heartburn, Disp: 90 tablet, Rfl: 3    gabapentin (NEURONTIN) 300 mg capsule, Take 1 capsule (300 mg total) by mouth 2 (two) times a day, Disp: 60 capsule, Rfl: 5    levothyroxine 50 mcg tablet, TAKE 1 TABLET BY MOUTH EVERY DAY, Disp: 90 tablet, Rfl: 1    loperamide (IMODIUM) 2 mg capsule, Take 1 capsule (2 mg total) by mouth 4 (four) times a day as needed for diarrhea, Disp: 120 capsule, Rfl: 0    midodrine (PROAMATINE) 5 mg tablet, Take 5 mg by mouth daily, Disp: , Rfl:     QUEtiapine (SEROquel) 100 mg tablet, Take 1 tablet (100 mg total) by mouth daily at bedtime, Disp: 90 tablet, Rfl: 1    Sodium Fluoride 5000 PPM 1.1 % PSTE, USE ONCE DAILY PREFERABLY AT NIGHT, Disp: , Rfl:     Past Medical History  Past Medical History:   Diagnosis Date    Abdominal adhesions     Alcoholism (HCC) 1996    in remission    Anesthesia complication     difficult to wake up    Anxiety     Arthritis     Asthma     with exertion    Cancer (HCC)     melanoma    Colon polyp     Crohn's disease (HCC)     Depression     Depression     Disease of thyroid gland     Fibromyalgia     Fibromyalgia, primary     Fractures 2006    GERD (gastroesophageal reflux disease)     History of cholecystectomy 09/07/2019    Hyperlipidemia     Infected tooth 01/2024     Tooth #14-was treated with antibiotics-endodontal appt 2/1 may need root canal    Irregular heart beat     can be tachy; also has orthoststic hypotension    Irritable bowel syndrome 1990    Lazy eye     resolved: 3/27/17    Medical clearance for psychiatric admission 07/13/2021    Melanoma (HCC) 2010    Mild asthma 11/04/2020    Osteoarthritis 07/2018    Osteopenia     with joint pain-elevated DEV    Polymyalgia (HCC)     Psychiatric disorder     Shortness of breath     assoc with tachycardia    Stomach disorder 1995    Tinnitus     Wears glasses     for reading       Past Surgical History   Past Surgical History:   Procedure Laterality Date    ABDOMINAL SURGERY      lysis of adhesions x 2    APPENDECTOMY      CERVICAL FUSION      May 5,2021 and Jan. 01,2020    CHOLECYSTECTOMY      open    COLONOSCOPY      DILATION AND CURETTAGE OF UTERUS      FOOT SURGERY  05/2017    NECK SURGERY  01/2019 5/2021    NEUROMA EXCISION Right 05/26/2017    Procedure: EXCISION MASS / FIBROMA FOOT;  Surgeon: Jorden Crespo DPM;  Location: [facility];  Service:     PARS PLANA VITRECTOMY W/ REPAIR OF MACULAR HOLE  12/23/2020    SD ARTHRP KNE CONDYLE&PLATU MEDIAL&LAT COMPARTMENTS Right 2/6/2024    Procedure: ARTHROPLASTY KNEE TOTAL W ROBOT - RIGHT - PRESS FIT - OVERNIGHT;  Surgeon: Jairon Kim DO;  Location: [facility];  Service: Orthopedics    SD XCAPSL CTRC RMVL INSJ IO LENS PROSTH W/O ECP Left 12/06/2021    Procedure: EXTRACTION EXTRACAPSULAR CATARACT PHACO INTRAOCULAR LENS (IOL);  Surgeon: Mandeep Chavez MD;  Location: [facility] MAIN OR;  Service: Ophthalmology    RIGHT OOPHORECTOMY      UPPER GASTROINTESTINAL ENDOSCOPY  5/2019    WISDOM TOOTH EXTRACTION      x4       Family History    Family History   Problem Relation Age of Onset    Heart disease Mother     Stroke Mother 62    COPD Mother     Arthritis Mother     Asthma Mother     Hypertension Father     Dislocations Sister     Multiple sclerosis Sister     Neurological problems  "Sister     Scoliosis Sister     Depression Sister     Mental illness Sister     No Known Problems Maternal Grandmother     No Known Problems Maternal Grandfather     No Known Problems Paternal Grandmother     No Known Problems Paternal Grandfather     Kidney disease Brother         kidney transplant    No Known Problems Maternal Aunt     No Known Problems Maternal Uncle     No Known Problems Paternal Aunt     No Known Problems Paternal Uncle     ADD / ADHD Neg Hx     Anesthesia problems Neg Hx     Cancer Neg Hx     Clotting disorder Neg Hx     Collagen disease Neg Hx     Diabetes Neg Hx     Dislocations Neg Hx     Learning disabilities Neg Hx     Neurological problems Neg Hx     Osteoporosis Neg Hx     Rheumatologic disease Neg Hx     Scoliosis Neg Hx     Vascular Disease Neg Hx      No known family history of autoimmune or inflammatory diseases.    Social History  Social History     Substance and Sexual Activity   Alcohol Use Not Currently     Social History     Substance and Sexual Activity   Drug Use No     Social History     Tobacco Use   Smoking Status Never    Passive exposure: Never   Smokeless Tobacco Never       Objective:  Vitals:    06/17/24 0845   BP: 124/70   Pulse: 88   Temp: 97.8 °F (36.6 °C)   SpO2: 98%   Weight: 75.6 kg (166 lb 9.6 oz)   Height: 5' 5\" (1.651 m)       Physical Exam  General: Well appearing, well nourished, in no acute distress. Oriented x 3, normal mood and affect.  Ambulating without difficulty.  Skin: Good turgor, no rash, unusual bruising or prominent lesions.  Hair: Normal texture and distribution.  Nails: Normal color, no deformities.  HEENT:  Head: Normocephalic, atraumatic.  Eyes: Conjunctiva clear, sclera non-icteric, EOM intact.  Nose: No external lesions, mucosa non-inflamed.  Mouth: Mucous membranes moist, no mucosal lesions.  Neck: Supple  Musculoskeletal:   No asymmetry or deformities noted of bilateral upper and lower extremities.  No pain or swelling of joints " bilaterally to include: shoulder, elbow, wrist, MCP I-V, PIP I-V, knee, ankle  Neurologic: Alert and oriented. No focal neurological deficits appreciated.   Psychiatric: Normal mood and affect.       Reviewed labs and imaging.    Imaging:   No results found.     Labs:   Orders Only on 03/18/2024   Component Date Value Ref Range Status    MPO AB 03/18/2024 <0.2  0.0 - 0.9 units Final    NH-3 AB 03/18/2024 <0.2  0.0 - 0.9 units Final    C-ANCA 03/18/2024 <1:20  Neg:<1:20 titer Final    P-ANCA 03/18/2024 <1:20  Neg:<1:20 titer Final    Comment: The presence of positive fluorescence exhibiting P-ANCA or C-ANCA  patterns alone is not specific for the diagnosis of Wegener's  Granulomatosis (WG) or microscopic polyangiitis. Decisions about  treatment should not be based solely on ANCA IFA results.  The  International ANCA Group Consensus recommends follow up testing of  positive sera with both NH-3 and MPO-ANCA enzyme immunoassays. As  many as 5% serum samples are positive only by EIA.  Ref. AM J Clin Pathol 1999;111:507-513.      Atypical pANCA 03/18/2024 <1:20  Neg:<1:20 titer Final    Comment: The atypical pANCA pattern has been observed in a significant  percentage of patients with ulcerative colitis, primary sclerosing  cholangitis and autoimmune hepatitis.      C-Reactive Protein, Quant 03/18/2024 2  0 - 10 mg/L Final    Antinuclear Antibodies, IFA 03/18/2024 Positive (A)   Final    Comment:                                      Negative   <1:80                                       Borderline  1:80                                       Positive   >1:80      Homogeneous Pattern 03/18/2024 1:160 (H)   Final    ICAP nomenclature: AC-1    Note: 03/18/2024 Comment   Final    Comment: Pattern              Potential Disease Association  -------------  ---------------------------------------------  Homogeneous    Systemic Lupus Erythematosus, Drug Induced                 Systemic Lupus Erythematosus, Chronic                  Autoimmune hepatitis, Juvenile Idiopathic                 Arthritis  -------------  ---------------------------------------------  Speckled       Sjogren Syndrome, Systemic Lupus                 Erythematosus, Subacute Cutaneous Lupus,                  Lupus, Congenital Heart Block,                 Mixed Connective Tissue Disease,                 Scleroderma-diffuse, Scleroderma-Autoimmune                 Myositis Overlap Syndrome, Systemic Lupus                 Xzawtnfzjyvio-Nltbyepbvkk-Pybhucjyke                 Myositis Overlap Syndrome, Systemic                 Autoimmune Rheumatic Disease,                 Undifferentiated Connective Tissue Disease  -------------  ---------------------------------------------  Nucleolar      Systemic Sc                           lerosis, Scleroderma-Autoimmune                 Myositis Overlap Syndrome, Sjogren                 Syndrome, Raynaud phenomenon, Pulmonary                 Arterial Hypertension, Systemic Autoimmune                 Rheumatic Disease, Cancer  -------------  ---------------------------------------------  Centromere     Scleroderma-CREST, Limited Cutaneous SSc,                 Raynaud's Phenomenon, Primary Biliary                 Cholangitis  -------------  ---------------------------------------------  Nuclear Dot    Primary Biliary Cholangitis  -------------  ---------------------------------------------  Nuclear        Primary Biliary Cholangitis, Autoimmune  Membrane       Hepatitis/Liver disease, Systemic Autoimmune                 Rheumatic Disease, Autoimmune Cytopenias,                 Linear Scleroderma, Antiphospholipid Syndrome  -------------  ---------------------------------------------     Admission on 2024, Discharged on 2024   Component Date Value Ref Range Status    POC Glucose 2024 112  65 - 140 mg/dl Final    MRSA Culture Only 2024 No Methicillin Resistant Staphlyococcus aureus (MRSA) isolated   Final     Sodium 02/07/2024 136  135 - 147 mmol/L Final    Potassium 02/07/2024 4.9  3.5 - 5.3 mmol/L Final    Chloride 02/07/2024 108  96 - 108 mmol/L Final    CO2 02/07/2024 19 (L)  21 - 32 mmol/L Final    ANION GAP 02/07/2024 9  mmol/L Final    BUN 02/07/2024 19  5 - 25 mg/dL Final    Creatinine 02/07/2024 0.63  0.60 - 1.30 mg/dL Final    Standardized to IDMS reference method    Glucose 02/07/2024 152 (H)  65 - 140 mg/dL Final    If the patient is fasting, the ADA then defines impaired fasting glucose as > 100 mg/dL and diabetes as > or equal to 123 mg/dL.    Glucose, Fasting 02/07/2024 152 (H)  65 - 99 mg/dL Final    Calcium 02/07/2024 8.9  8.4 - 10.2 mg/dL Final    eGFR 02/07/2024 93  ml/min/1.73sq m Final    WBC 02/07/2024 15.76 (H)  4.31 - 10.16 Thousand/uL Final    RBC 02/07/2024 3.60 (L)  3.81 - 5.12 Million/uL Final    Hemoglobin 02/07/2024 10.9 (L)  11.5 - 15.4 g/dL Final    Hematocrit 02/07/2024 32.1 (L)  34.8 - 46.1 % Final    MCV 02/07/2024 89  82 - 98 fL Final    MCH 02/07/2024 30.3  26.8 - 34.3 pg Final    MCHC 02/07/2024 34.0  31.4 - 37.4 g/dL Final    RDW 02/07/2024 13.0  11.6 - 15.1 % Final    MPV 02/07/2024 10.6  8.9 - 12.7 fL Final    Platelets 02/07/2024 284  149 - 390 Thousands/uL Final    nRBC 02/07/2024 0  /100 WBCs Final    Segmented % 02/07/2024 87 (H)  43 - 75 % Final    Immature Grans % 02/07/2024 1  0 - 2 % Final    Lymphocytes % 02/07/2024 6 (L)  14 - 44 % Final    Monocytes % 02/07/2024 6  4 - 12 % Final    Eosinophils Relative 02/07/2024 0  0 - 6 % Final    Basophils Relative 02/07/2024 0  0 - 1 % Final    Absolute Neutrophils 02/07/2024 13.80 (H)  1.85 - 7.62 Thousands/µL Final    Absolute Immature Grans 02/07/2024 0.09  0.00 - 0.20 Thousand/uL Final    Absolute Lymphocytes 02/07/2024 0.89  0.60 - 4.47 Thousands/µL Final    Absolute Monocytes 02/07/2024 0.97  0.17 - 1.22 Thousand/µL Final    Eosinophils Absolute 02/07/2024 0.00  0.00 - 0.61 Thousand/µL Final    Basophils Absolute 02/07/2024  0.01  0.00 - 0.10 Thousands/µL Final   Appointment on 01/18/2024   Component Date Value Ref Range Status    Sodium 01/18/2024 141  135 - 147 mmol/L Final    Potassium 01/18/2024 4.6  3.5 - 5.3 mmol/L Final    Chloride 01/18/2024 105  96 - 108 mmol/L Final    CO2 01/18/2024 25  21 - 32 mmol/L Final    ANION GAP 01/18/2024 11  mmol/L Final    BUN 01/18/2024 17  5 - 25 mg/dL Final    Creatinine 01/18/2024 0.83  0.60 - 1.30 mg/dL Final    Standardized to IDMS reference method    Glucose 01/18/2024 126  65 - 140 mg/dL Final    If the patient is fasting, the ADA then defines impaired fasting glucose as > 100 mg/dL and diabetes as > or equal to 123 mg/dL.    Calcium 01/18/2024 9.4  8.4 - 10.2 mg/dL Final    AST 01/18/2024 34  13 - 39 U/L Final    ALT 01/18/2024 35  7 - 52 U/L Final    Specimen collection should occur prior to Sulfasalazine administration due to the potential for falsely depressed results.     Alkaline Phosphatase 01/18/2024 61  34 - 104 U/L Final    Total Protein 01/18/2024 6.4  6.4 - 8.4 g/dL Final    Albumin 01/18/2024 4.1  3.5 - 5.0 g/dL Final    Total Bilirubin 01/18/2024 0.39  0.20 - 1.00 mg/dL Final    Use of this assay is not recommended for patients undergoing treatment with eltrombopag due to the potential for falsely elevated results.  N-acetyl-p-benzoquinone imine (metabolite of Acetaminophen) will generate erroneously low results in samples for patients that have taken an overdose of Acetaminophen.    eGFR 01/18/2024 73  ml/min/1.73sq m Final    WBC 01/18/2024 4.70  4.31 - 10.16 Thousand/uL Final    RBC 01/18/2024 4.56  3.81 - 5.12 Million/uL Final    Hemoglobin 01/18/2024 13.1  11.5 - 15.4 g/dL Final    Hematocrit 01/18/2024 41.4  34.8 - 46.1 % Final    MCV 01/18/2024 91  82 - 98 fL Final    MCH 01/18/2024 28.7  26.8 - 34.3 pg Final    MCHC 01/18/2024 31.6  31.4 - 37.4 g/dL Final    RDW 01/18/2024 13.5  11.6 - 15.1 % Final    MPV 01/18/2024 10.6  8.9 - 12.7 fL Final    Platelets 01/18/2024 272   149 - 390 Thousands/uL Final    nRBC 01/18/2024 0  /100 WBCs Final    Segmented % 01/18/2024 64  43 - 75 % Final    Immature Grans % 01/18/2024 0  0 - 2 % Final    Lymphocytes % 01/18/2024 22  14 - 44 % Final    Monocytes % 01/18/2024 11  4 - 12 % Final    Eosinophils Relative 01/18/2024 2  0 - 6 % Final    Basophils Relative 01/18/2024 1  0 - 1 % Final    Absolute Neutrophils 01/18/2024 3.00  1.85 - 7.62 Thousands/µL Final    Absolute Immature Grans 01/18/2024 0.01  0.00 - 0.20 Thousand/uL Final    Absolute Lymphocytes 01/18/2024 1.05  0.60 - 4.47 Thousands/µL Final    Absolute Monocytes 01/18/2024 0.50  0.17 - 1.22 Thousand/µL Final    Eosinophils Absolute 01/18/2024 0.10  0.00 - 0.61 Thousand/µL Final    Basophils Absolute 01/18/2024 0.04  0.00 - 0.10 Thousands/µL Final    Protime 01/18/2024 12.9  11.6 - 14.5 seconds Final    INR 01/18/2024 0.98  0.84 - 1.19 Final    PTT 01/18/2024 27  23 - 37 seconds Final    Therapeutic Heparin Range =  60-90 seconds    MRSA Culture Only 01/18/2024 Methicillin Resistant Staphylococcus aureus isolated (A)   Final    MRSA Culture Only 01/18/2024 This patient requires contact isolation precautions per [place] law. Contact precautions are not required in [place] for nasal surveillance cultures.   Final    Hemoglobin A1C 01/18/2024 6.6 (H)  Normal 4.0-5.6%; PreDiabetic 5.7-6.4%; Diabetic >=6.5%; Glycemic control for adults with diabetes <7.0% % Final    EAG 01/18/2024 143  mg/dl Final   Orders Only on 01/10/2024   Component Date Value Ref Range Status    IgG 01/10/2024 714  586 - 1,602 mg/dL Final    IgG 1 01/10/2024 436  248 - 810 mg/dL Final    IgG 2 01/10/2024 201  130 - 555 mg/dL Final    IgG 3 01/10/2024 59  15 - 102 mg/dL Final    IgG 4 01/10/2024 25  2 - 96 mg/dL Final    IgA 01/10/2024 218  87 - 352 mg/dL Final    IGM 01/10/2024 120  26 - 217 mg/dL Final    Class Description 01/10/2024 Comment   Final    Comment:     Levels of Specific IgE       Class  Description  of Class      ---------------------------  -----  --------------------                     < 0.10         0         Negative             0.10 -    0.31         0/I       Equivocal/Low             0.32 -    0.55         I         Low             0.56 -    1.40         II        Moderate             1.41 -    3.90         III       High             3.91 -   19.00         IV        Very High            19.01 -  100.00         V         Very High                    >100.00         VI        Very High      Immunoglobulin E, Total 01/10/2024 <2 (L)  6 - 495 IU/mL Final    D.PTERONYSSINUS IGE 01/10/2024 <0.10  Class 0 kU/L Final    D. FARINAE 01/10/2024 <0.10  Class 0 kU/L Final    Cat Dander 01/10/2024 <0.10  Class 0 kU/L Final    DOG DANDER IGE 01/10/2024 <0.10  Class 0 kU/L Final    BERMUDA GRASS IGE 01/10/2024 <0.10  Class 0 kU/L Final    VAMSI GRASS 01/10/2024 <0.10  Class 0 kU/L Final    COCKROACH, Hungarian 01/10/2024 <0.10  Class 0 kU/L Final    PENICILLIUM CHRYSOGENUM 01/10/2024 <0.10  Class 0 kU/L Final    Cladosporium Herbarum 01/10/2024 <0.10  Class 0 kU/L Final    Aspergillus fumigatus 01/10/2024 <0.10  Class 0 kU/L Final    Alternaria Alternata 01/10/2024 <0.10  Class 0 kU/L Final    MAPLE/BOX ELDER IGE 01/10/2024 <0.10  Class 0 kU/L Final    Birch 01/10/2024 <0.10  Class 0 kU/L Final    MOUNTAIN CEDAR 01/10/2024 <0.10  Class 0 kU/L Final    T007 [place], White 01/10/2024 <0.10  Class 0 kU/L Final    [place] 01/10/2024 <0.10  Class 0 kU/L Final    [place] 01/10/2024 <0.10  Class 0 kU/L Final    [place] MAPLE LEAF 01/10/2024 <0.10  Class 0 kU/L Final    [place] 01/10/2024 <0.10  Class 0 kU/L Final    WHITE MARITZA 01/10/2024 <0.10  Class 0 kU/L Final    T070 [place] 01/10/2024 <0.10  Class 0 kU/L Final    Short Ragwd(A kevin.) IgE 01/10/2024 <0.10  Class 0 kU/L Final    MUGWORT IGE 01/10/2024 <0.10  Class 0 kU/L Final    COMMON PIGWEED 01/10/2024 <0.10  Class 0 kU/L Final    SHEEP SORREL 01/10/2024  <0.10  Class 0 kU/L Final    Mouse Urine Proteins 01/10/2024 <0.10  Class 0 kU/L Final         Oscar Newton PA-C  Rheumatology

## 2024-06-17 NOTE — PROGRESS NOTES
[facility]        NAME: Ina Tolbert is a 68 y.o. female  : 1955    MRN: [id number]  DATE: 2024  TIME: 5:18 PM    Assessment and Plan   Acute nonintractable headache, unspecified headache type [R51.9]  1. Acute nonintractable headache, unspecified headache type        2. Hemisensory deficit          Due to sudden onset of headache along with abnormal neuroexam, recommend ED for further evaluation and management.  Patient agreeable and was transported via EMS.    Patient Instructions       Follow up with PCP in 3-5 days.  Proceed to  ER if symptoms worsen.    If tests are performed, our office will contact you with results only if changes need to made to the care plan discussed with you at the visit. [facility]    Chief Complaint     Chief Complaint   Patient presents with    Headache     Pt presents with headache that started last night; states right side of head is isolated pain         History of Present Illness       Patient is a 68-year-old female with PMH TIA, alcoholism in remission, cervical spine stenosis, fibromyalgia, asthma, anxiety/depression, Crohn's disease, polymyalgia, orthostatic hypotension presenting with headache x 2 hours.  States she had somewhat of a headache last night that resolved with Tylenol, but then 2 hours ago while at work she developed sudden onset 5 out of 10 right sided temporal headache.  Hurts to touch.  States she has never had a headache like this before.  Endorses lower extremity weakness which she noticed when getting out of the car to come in for this visit.  Denies any numbness or tingling. No visual changes, URI symptoms, fevers, cp, sob, vomiting/diarrhea.        Review of Systems   Review of Systems   Constitutional:  Negative for chills, diaphoresis and fever.   HENT:  Negative for congestion and sore throat.    Eyes:  Negative for photophobia, pain and visual disturbance.   Respiratory:  Positive  for cough (Chronic intermittent).    Cardiovascular:  Negative for chest pain and palpitations.   Gastrointestinal:  Negative for nausea and vomiting.   Musculoskeletal:  Negative for back pain and myalgias.   Skin:  Negative for rash.   Neurological:  Positive for headaches. Negative for dizziness and numbness.         Current Medications       Current Outpatient Medications:     albuterol (ProAir HFA) 90 mcg/act inhaler, Inhale 2 puffs every 6 (six) hours as needed for wheezing, Disp: 8.5 g, Rfl: 0    amoxicillin (AMOXIL) 500 MG tablet, Take 4 tablets (2,000 mg total) by mouth 60 minutes pre-procedure, Disp: 8 tablet, Rfl: 2    Calcium Carb-Cholecalciferol 600-12.5 MG-MCG CAPS, Take by mouth daily in the early morning, Disp: , Rfl:     docusate sodium (COLACE) 100 mg capsule, Take 1 capsule (100 mg total) by mouth 2 (two) times a day, Disp: 60 capsule, Rfl: 0    DULoxetine (CYMBALTA) 60 mg delayed release capsule, Take 1 capsule (60 mg total) by mouth daily, Disp: 90 capsule, Rfl: 1    esomeprazole (NexIUM) 40 MG capsule, TAKE 1 CAPSULE BY MOUTH EVERY DAY BEFORE BREAKFAST, Disp: 90 capsule, Rfl: 1    famotidine (PEPCID) 20 mg tablet, Take 1 tablet (20 mg total) by mouth daily as needed for heartburn, Disp: 90 tablet, Rfl: 3    levothyroxine 50 mcg tablet, TAKE 1 TABLET BY MOUTH EVERY DAY, Disp: 90 tablet, Rfl: 1    loperamide (IMODIUM) 2 mg capsule, Take 1 capsule (2 mg total) by mouth 4 (four) times a day as needed for diarrhea, Disp: 120 capsule, Rfl: 0    midodrine (PROAMATINE) 5 mg tablet, Take 5 mg by mouth daily, Disp: , Rfl:     QUEtiapine (SEROquel) 100 mg tablet, Take 1 tablet (100 mg total) by mouth daily at bedtime, Disp: 90 tablet, Rfl: 1    Sodium Fluoride 5000 PPM 1.1 % PSTE, USE ONCE DAILY PREFERABLY AT NIGHT, Disp: , Rfl:     gabapentin (NEURONTIN) 300 mg capsule, TAKE 1 CAPSULE (300 MG TOTAL) BY MOUTH 2 TIMES A DAY., Disp: 60 capsule, Rfl: 2    Current Allergies     Allergies as of 06/17/2024 -  Reviewed 06/17/2024   Allergen Reaction Noted    Meperidine Lightheadedness and Other (See Comments) 01/11/2006    Sulfa antibiotics Hives 01/11/2006            The following portions of the patient's history were reviewed and updated as appropriate: allergies, current medications, past family history, past medical history, past social history, past surgical history and problem list.     Past Medical History:   Diagnosis Date    Abdominal adhesions     Alcoholism ([facility]) 1996    in remission    Anesthesia complication     difficult to wake up    Anxiety     Arthritis     Asthma     with exertion    Cancer ([facility])     melanoma    Colon polyp     Crohn's disease ([facility])     Depression     Depression     Disease of thyroid gland     Fibromyalgia     Fibromyalgia, primary     Fractures 2006    GERD (gastroesophageal reflux disease)     History of cholecystectomy 09/07/2019    Hyperlipidemia     Infected tooth 01/2024    Tooth #14-was treated with antibiotics-endodontal appt 2/1 may need root canal    Irregular heart beat     can be tachy; also has orthoststic hypotension    Irritable bowel syndrome 1990    Lazy eye     resolved: 3/27/17    Medical clearance for psychiatric admission 07/13/2021    Melanoma ([facility]) 2010    Mild asthma 11/04/2020    Osteoarthritis 07/2018    Osteopenia     with joint pain-elevated DEV    Polymyalgia ([facility])     Psychiatric disorder     Shortness of breath     assoc with tachycardia    Stomach disorder 1995    Tinnitus     Wears glasses     for reading       Past Surgical History:   Procedure Laterality Date    ABDOMINAL SURGERY      lysis of adhesions x 2    APPENDECTOMY      CERVICAL FUSION      May 5,2021 and Jan. 01,2020    CHOLECYSTECTOMY      open    COLONOSCOPY      DILATION AND CURETTAGE OF UTERUS      FOOT SURGERY  05/2017    NECK SURGERY  01/2019 5/2021    NEUROMA EXCISION Right 05/26/2017    Procedure: EXCISION MASS / FIBROMA FOOT;  Surgeon: Jorden Crespo DPM;  Location: [facility] MAIN OR;   Service:     PARS PLANA VITRECTOMY W/ REPAIR OF MACULAR HOLE  12/23/2020    [place] ARTHRP KNE CONDYLE&PLATU MEDIAL&LAT COMPARTMENTS Right 2/6/2024    Procedure: ARTHROPLASTY KNEE TOTAL W ROBOT - RIGHT - PRESS FIT - OVERNIGHT;  Surgeon: Jairon Kim DO;  Location: [facility];  Service: Orthopedics    WV XCAPSL CTRC RMVL INSJ IO LENS PROSTH W/O ECP Left 12/06/2021    Procedure: EXTRACTION EXTRACAPSULAR CATARACT PHACO INTRAOCULAR LENS (IOL);  Surgeon: Mandeep Chavez MD;  Location: [facility] MAIN OR;  Service: Ophthalmology    RIGHT OOPHORECTOMY      UPPER GASTROINTESTINAL ENDOSCOPY  5/2019    WISDOM TOOTH EXTRACTION      x4       Family History   Problem Relation Age of Onset    Heart disease Mother     Stroke Mother 62    COPD Mother     Arthritis Mother     Asthma Mother     Hypertension Father     Dislocations Sister     Multiple sclerosis Sister     Neurological problems Sister     Scoliosis Sister     Depression Sister     Mental illness Sister     No Known Problems Maternal Grandmother     No Known Problems Maternal Grandfather     No Known Problems Paternal Grandmother     No Known Problems Paternal Grandfather     Kidney disease Brother         kidney transplant    No Known Problems Maternal Aunt     No Known Problems Maternal Uncle     No Known Problems Paternal Aunt     No Known Problems Paternal Uncle     ADD / ADHD Neg Hx     Anesthesia problems Neg Hx     Cancer Neg Hx     Clotting disorder Neg Hx     Collagen disease Neg Hx     Diabetes Neg Hx     Dislocations Neg Hx     Learning disabilities Neg Hx     Neurological problems Neg Hx     Osteoporosis Neg Hx     Rheumatologic disease Neg Hx     Scoliosis Neg Hx     Vascular Disease Neg Hx          Medications have been verified.        Objective   /72   Pulse 98   Temp (!) 97.2 °F (36.2 °C)   Resp 14   Wt 76.2 kg (168 lb)   LMP  (LMP Unknown)   SpO2 95%   BMI 27.96 kg/m²        Physical Exam     Physical Exam  Vitals and nursing note reviewed.    Constitutional:       General: She is not in acute distress.     Appearance: Normal appearance. She is not ill-appearing.   HENT:      Head: Normocephalic and atraumatic.      Right Ear: Tympanic membrane, ear canal and external ear normal.      Left Ear: Tympanic membrane, ear canal and external ear normal.      Nose: Nose normal.      Mouth/Throat:      Mouth: Mucous membranes are moist.      Pharynx: Oropharynx is clear.   Eyes:      Extraocular Movements: Extraocular movements intact.      Conjunctiva/sclera: Conjunctivae normal.      Pupils: Pupils are equal, round, and reactive to light.   Cardiovascular:      Rate and Rhythm: Normal rate and regular rhythm.      Heart sounds: Normal heart sounds.   Pulmonary:      Effort: Pulmonary effort is normal. No respiratory distress.      Breath sounds: Wheezing (expiratory wheezing b/l lower lung fields) and rhonchi present.   Skin:     General: Skin is warm and dry.      Capillary Refill: Capillary refill takes less than 2 seconds.   Neurological:      Mental Status: She is alert and oriented to person, place, and time.      GCS: GCS eye subscore is 4. GCS verbal subscore is 5. GCS motor subscore is 6.      Cranial Nerves: Cranial nerves 2-12 are intact. No dysarthria or facial asymmetry.      Sensory: Sensory deficit (reports decreased sensation to right upper and lower extremities, reports complete numbness of R great toe) present.      Motor: Motor function is intact. No weakness, abnormal muscle tone or pronator drift.      Coordination: Romberg sign negative. Finger-Nose-Finger Test normal.      Gait: Tandem walk abnormal (unsteady).      Deep Tendon Reflexes:      Reflex Scores:       Patellar reflexes are 3+ on the right side and 3+ on the left side.  Psychiatric:         Behavior: Behavior normal.

## 2024-06-18 VITALS
HEIGHT: 65 IN | RESPIRATION RATE: 18 BRPM | SYSTOLIC BLOOD PRESSURE: 131 MMHG | TEMPERATURE: 97.9 F | BODY MASS INDEX: 27.02 KG/M2 | DIASTOLIC BLOOD PRESSURE: 89 MMHG | HEART RATE: 114 BPM | OXYGEN SATURATION: 94 % | WEIGHT: 162.2 LBS

## 2024-06-18 PROBLEM — R21 RASH: Status: ACTIVE | Noted: 2024-06-18

## 2024-06-18 LAB
ANION GAP SERPL CALCULATED.3IONS-SCNC: 5 MMOL/L (ref 4–13)
BASOPHILS # BLD MANUAL: 0 THOUSAND/UL (ref 0–0.1)
BASOPHILS NFR MAR MANUAL: 0 % (ref 0–1)
BUN SERPL-MCNC: 15 MG/DL (ref 5–25)
CALCIUM SERPL-MCNC: 8.9 MG/DL (ref 8.4–10.2)
CHLORIDE SERPL-SCNC: 107 MMOL/L (ref 96–108)
CO2 SERPL-SCNC: 24 MMOL/L (ref 21–32)
CREAT SERPL-MCNC: 0.75 MG/DL (ref 0.6–1.3)
CRP SERPL QL: 11.3 MG/L
EOSINOPHIL # BLD MANUAL: 0 THOUSAND/UL (ref 0–0.4)
EOSINOPHIL NFR BLD MANUAL: 0 % (ref 0–6)
ERYTHROCYTE [DISTWIDTH] IN BLOOD BY AUTOMATED COUNT: 13.5 % (ref 11.6–15.1)
ERYTHROCYTE [SEDIMENTATION RATE] IN BLOOD: 23 MM/HOUR (ref 0–29)
GFR SERPL CREATININE-BSD FRML MDRD: 82 ML/MIN/1.73SQ M
GLUCOSE SERPL-MCNC: 152 MG/DL (ref 65–140)
HCT VFR BLD AUTO: 40.2 % (ref 34.8–46.1)
HGB BLD-MCNC: 12.8 G/DL (ref 11.5–15.4)
LYMPHOCYTES # BLD AUTO: 0.44 THOUSAND/UL (ref 0.6–4.47)
LYMPHOCYTES # BLD AUTO: 7 % (ref 14–44)
MCH RBC QN AUTO: 26.3 PG (ref 26.8–34.3)
MCHC RBC AUTO-ENTMCNC: 31.8 G/DL (ref 31.4–37.4)
MCV RBC AUTO: 83 FL (ref 82–98)
MONOCYTES # BLD AUTO: 0 THOUSAND/UL (ref 0–1.22)
MONOCYTES NFR BLD: 0 % (ref 4–12)
NEUTROPHILS # BLD MANUAL: 4 THOUSAND/UL (ref 1.85–7.62)
NEUTS BAND NFR BLD MANUAL: 1 % (ref 0–8)
NEUTS SEG NFR BLD AUTO: 89 % (ref 43–75)
PLATELET # BLD AUTO: 261 THOUSANDS/UL (ref 149–390)
PLATELET BLD QL SMEAR: ADEQUATE
PMV BLD AUTO: 10.8 FL (ref 8.9–12.7)
POTASSIUM SERPL-SCNC: 4.6 MMOL/L (ref 3.5–5.3)
RBC # BLD AUTO: 4.86 MILLION/UL (ref 3.81–5.12)
RBC MORPH BLD: NORMAL
SODIUM SERPL-SCNC: 136 MMOL/L (ref 135–147)
VARIANT LYMPHS # BLD AUTO: 3 %
WBC # BLD AUTO: 4.44 THOUSAND/UL (ref 4.31–10.16)

## 2024-06-18 PROCEDURE — 99417 PROLNG OP E/M EACH 15 MIN: CPT | Performed by: NURSE PRACTITIONER

## 2024-06-18 PROCEDURE — 94760 N-INVAS EAR/PLS OXIMETRY 1: CPT

## 2024-06-18 PROCEDURE — 87081 CULTURE SCREEN ONLY: CPT | Performed by: FAMILY MEDICINE

## 2024-06-18 PROCEDURE — 94640 AIRWAY INHALATION TREATMENT: CPT

## 2024-06-18 PROCEDURE — 86140 C-REACTIVE PROTEIN: CPT

## 2024-06-18 PROCEDURE — 85652 RBC SED RATE AUTOMATED: CPT

## 2024-06-18 PROCEDURE — 99215 OFFICE O/P EST HI 40 MIN: CPT | Performed by: NURSE PRACTITIONER

## 2024-06-18 PROCEDURE — 85007 BL SMEAR W/DIFF WBC COUNT: CPT

## 2024-06-18 PROCEDURE — 99239 HOSP IP/OBS DSCHRG MGMT >30: CPT | Performed by: FAMILY MEDICINE

## 2024-06-18 PROCEDURE — 80048 BASIC METABOLIC PNL TOTAL CA: CPT

## 2024-06-18 PROCEDURE — 85027 COMPLETE CBC AUTOMATED: CPT

## 2024-06-18 PROCEDURE — 93005 ELECTROCARDIOGRAM TRACING: CPT

## 2024-06-18 RX ORDER — ALBUTEROL SULFATE 2.5 MG/3ML
2.5 SOLUTION RESPIRATORY (INHALATION) ONCE
Status: COMPLETED | OUTPATIENT
Start: 2024-06-18 | End: 2024-06-18

## 2024-06-18 RX ORDER — METOCLOPRAMIDE HYDROCHLORIDE 5 MG/ML
10 INJECTION INTRAMUSCULAR; INTRAVENOUS EVERY 8 HOURS SCHEDULED
Status: DISCONTINUED | OUTPATIENT
Start: 2024-06-18 | End: 2024-06-19 | Stop reason: HOSPADM

## 2024-06-18 RX ORDER — DIPHENHYDRAMINE HYDROCHLORIDE 50 MG/ML
25 INJECTION INTRAMUSCULAR; INTRAVENOUS EVERY 8 HOURS SCHEDULED
Status: DISCONTINUED | OUTPATIENT
Start: 2024-06-18 | End: 2024-06-19 | Stop reason: HOSPADM

## 2024-06-18 RX ADMIN — KETOROLAC TROMETHAMINE 30 MG: 30 INJECTION, SOLUTION INTRAMUSCULAR; INTRAVENOUS at 14:20

## 2024-06-18 RX ADMIN — ACETAMINOPHEN 650 MG: 325 TABLET ORAL at 12:38

## 2024-06-18 RX ADMIN — Medication 1 TABLET: at 09:18

## 2024-06-18 RX ADMIN — PANTOPRAZOLE SODIUM 40 MG: 40 TABLET, DELAYED RELEASE ORAL at 06:06

## 2024-06-18 RX ADMIN — DOCUSATE SODIUM 100 MG: 100 CAPSULE, LIQUID FILLED ORAL at 17:44

## 2024-06-18 RX ADMIN — LEVOTHYROXINE SODIUM 50 MCG: 50 TABLET ORAL at 06:06

## 2024-06-18 RX ADMIN — ENOXAPARIN SODIUM 40 MG: 40 INJECTION SUBCUTANEOUS at 09:19

## 2024-06-18 RX ADMIN — DULOXETINE HYDROCHLORIDE 60 MG: 30 CAPSULE, DELAYED RELEASE ORAL at 09:18

## 2024-06-18 RX ADMIN — ALBUTEROL SULFATE 2.5 MG: 2.5 SOLUTION RESPIRATORY (INHALATION) at 10:26

## 2024-06-18 RX ADMIN — MIDODRINE HYDROCHLORIDE 5 MG: 5 TABLET ORAL at 09:17

## 2024-06-18 RX ADMIN — METOCLOPRAMIDE 10 MG: 5 INJECTION, SOLUTION INTRAMUSCULAR; INTRAVENOUS at 14:20

## 2024-06-18 RX ADMIN — CYCLOBENZAPRINE HYDROCHLORIDE 10 MG: 10 TABLET, FILM COATED ORAL at 06:06

## 2024-06-18 RX ADMIN — DOCUSATE SODIUM 100 MG: 100 CAPSULE, LIQUID FILLED ORAL at 09:18

## 2024-06-18 RX ADMIN — DIPHENHYDRAMINE HYDROCHLORIDE 25 MG: 50 INJECTION, SOLUTION INTRAMUSCULAR; INTRAVENOUS at 14:20

## 2024-06-18 RX ADMIN — CYCLOBENZAPRINE HYDROCHLORIDE 10 MG: 10 TABLET, FILM COATED ORAL at 14:20

## 2024-06-18 RX ADMIN — KETOROLAC TROMETHAMINE 30 MG: 30 INJECTION, SOLUTION INTRAMUSCULAR; INTRAVENOUS at 05:55

## 2024-06-18 RX ADMIN — GABAPENTIN 300 MG: 300 CAPSULE ORAL at 17:44

## 2024-06-18 RX ADMIN — GABAPENTIN 300 MG: 300 CAPSULE ORAL at 09:18

## 2024-06-18 RX ADMIN — SODIUM CHLORIDE 50 ML/HR: 0.9 INJECTION, SOLUTION INTRAVENOUS at 16:15

## 2024-06-18 RX ADMIN — POLYETHYLENE GLYCOL 3350 17 G: 17 POWDER, FOR SOLUTION ORAL at 09:18

## 2024-06-18 NOTE — ASSESSMENT & PLAN NOTE
Blood pressure stable  History of orthostatic blood pressure  Home regimen midodrine 5 mg daily, continue  Obtain orthostatic blood pressures x1

## 2024-06-18 NOTE — ASSESSMENT & PLAN NOTE
POA a sudden onset of severe headache on right side,  above right ear.  She states the headache started suddenly at around 2 PM today while sitting in the car getting ready for the next patient she was going to see.  States it hurts to touch.  Denies double vision,nausea, and vomiting, & dizziness.  She also noted b/l lower leg weakness and unsteadiness. Denies numbness and tingling.  Denies URI symptoms.  Does states she had a headache last night and it resolved after taking Tylenol PM.   CTA of head and neck: Significantly narrowed caliber right vertebral artery from its origin throughout the neck and pre-PICA intracranial segment with more robust flow in the post PICA intracranial segment, grossly stable from 11/24/2022, new from 10/20/2020. No stenosis in the left vertebral and bilateral carotid arteries. Patent major vessels of the [place] without significant stenosis  History of focal migraines and follows with neurology as an outpatient  No focal neurodeficits noted,  ataxia and b/l weakness noted in an lower extremities  Suspect complex migraine  CRP 11.3 sed rate 23  In ED, migraine cocktail: decadron 10 mg IV, benadryl 25 mg IV, and reglan 10 mg IV - also morphine 4 mg Iv x 1 without relief  Neurology consulted appreciate recommendations  Monitor neurostatus q4 hr  Will give a dose of magnesium 2 g IV, start IV Toradol 30 mg  and start  po Flexeril 10 mg q8 hr  Hold IVF.

## 2024-06-18 NOTE — ASSESSMENT & PLAN NOTE
POA a sudden onset of severe headache on right side,  above right ear.  She states the headache started suddenly at around 2 PM today while sitting in the car getting ready for the next patient she was going to see.  States it hurts to touch.  Denies double vision,nausea, and vomiting, & dizziness.  She also noted b/l lower leg weakness and unsteadiness. Denies numbness and tingling.  Denies URI symptoms.  Does states she had a headache last night and it resolved after taking Tylenol PM.   CTA of head and neck: Significantly narrowed caliber right vertebral artery from its origin throughout the neck and pre-PICA intracranial segment with more robust flow in the post PICA intracranial segment, grossly stable from 11/24/2022, new from 10/20/2020. No stenosis in the left vertebral and bilateral carotid arteries. Patent major vessels of the [place] without significant stenosis  CRP 11.3 sed rate 23  History of focal migraines and follows with neurology as an outpatient  No focal neurodeficits noted,  ataxia and b/l weakness noted in an lower extremities in the ED. Provided with migraine cocktail in ED.  Suspect complex migraine  Neurology consulted appreciate recommendations. Patient received a dose of IV magnesium, Toradol and po Flexeril  q8 hr which resolved the headache.

## 2024-06-18 NOTE — ASSESSMENT & PLAN NOTE
Patient notes that her  recently started cancer treatment and her job can be stressful at times. Patient feels like she has been dealing with these stressors well.  Home regimen Cymbalta and Seroquel continue   QTc 474

## 2024-06-18 NOTE — PROGRESS NOTES
"[facility]  Progress Note  Name: Ina Tolbert I  MRN: [id number]  Unit/Bed#: [address] [place] [address] Date of Admission: 6/17/2024   Date of Service: 6/18/2024 I Hospital Day: 0    Assessment & Plan   * Acute intractable headache  Assessment & Plan  POA a sudden onset of severe headache on right side,  above right ear.  She states the headache started suddenly at around 2 PM today while sitting in the car getting ready for the next patient she was going to see.  States it hurts to touch.  Denies double vision,nausea, and vomiting, & dizziness.  She also noted b/l lower leg weakness and unsteadiness. Denies numbness and tingling.  Denies URI symptoms.  Does states she had a headache last night and it resolved after taking Tylenol PM.   CTA of head and neck: Significantly narrowed caliber right vertebral artery from its origin throughout the neck and pre-PICA intracranial segment with more robust flow in the post PICA intracranial segment, grossly stable from 11/24/2022, new from 10/20/2020. No stenosis in the left vertebral and bilateral carotid arteries. Patent major vessels of the [place] without significant stenosis  History of focal migraines and follows with neurology as an outpatient  No focal neurodeficits noted,  ataxia and b/l weakness noted in an lower extremities  Suspect complex migraine  CRP 11.3 sed rate 23  In ED, migraine cocktail: decadron 10 mg IV, benadryl 25 mg IV, and reglan 10 mg IV - also morphine 4 mg Iv x 1 without relief  Neurology consulted appreciate recommendations  Monitor neurostatus q4 hr  Will give a dose of magnesium 2 g IV, start IV Toradol 30 mg  and start  po Flexeril 10 mg q8 hr  Hold IVF.    Rash  Assessment & Plan  Patient noticed she has tender \"bumps\" on the right side of her head yesterday. Denies new products or recent trauma. Patient has a tender 1 cm round spot on her scalp, the spot is slightly raised and more tan in coloration compared to the " rest of the scalp. Does not itch or produce discharge.  Dermatology consulted, input appreciated.  Follow up with dermatology outpatient    Anxiety and depression  Assessment & Plan  Patient notes that her  recently started cancer treatment and her job can be stressful at times. Patient feels like she has been dealing with these stressors well.  Home regimen Cymbalta and Seroquel continue   QTc 474    Mild asthma  Assessment & Plan  Patient states she feels SOB today while laying in bed. Normally patient only needs inhaler due to exertional activities. Minimal wheezing on exam.  Give albuterol nebulizer  Hold IVF    Orthostatic hypotension  Assessment & Plan  History of orthostatic blood pressure  Continue home regimen midodrine 5 mg daily      Radiculopathy of lumbar region  Assessment & Plan  Home regimen: Cymbalta and Neurontin continue    Irritable bowel syndrome  Assessment & Plan  Chronic & follows with GI  Last BM 6/16  Continue home regimen Imodium               VTE Pharmacologic Prophylaxis:   High Risk (Score >/= 5) - Pharmacological DVT Prophylaxis Ordered: enoxaparin (Lovenox). Sequential Compression Devices Ordered.    Mobility:   Basic Mobility Inpatient Raw Score: 24  JH-HLM Goal: 8: Walk 250 feet or more  JH-HLM Achieved: 8: Walk 250 feet ot more  JH-HLM Goal achieved. Continue to encourage appropriate mobility.    Patient Centered Rounds: I performed bedside rounds with nursing staff today.   Discussions with Specialists or Other Care Team Provider: Neurology    Education and Discussions with Family / Patient: Updated  () at bedside.    Total Time Spent on Date of Encounter in care of patient: < 30  mins. This time was spent on one or more of the following: performing physical exam; counseling and coordination of care; obtaining or reviewing history; documenting in the medical record; reviewing/ordering tests, medications or procedures; communicating with other healthcare  professionals and discussing with patient's family/caregivers.    Current Length of Stay: 0 day(s)  Current Patient Status: Observation   Certification Statement: The patient will continue to require additional inpatient hospital stay due to intractable headache, generalized lower extremity weakness  Discharge Plan: Anticipate discharge in 24-48 hrs to home.    Code Status: Level 1 - Full Code    Subjective:   Ina was seen and examined at bedside. She is sitting up in bed in no acute distress. She states that her headache has resolved. She notes that she feels little bumps on the right side of her head that are tender to palpation. She notes that she first noticed them yesterday. She denies having anything similar elsewhere on her body. She has never had anything like this before. Denies new hair products, recently dying her hair or trauma. Patient endorses weakness, decreased appetite, heat intolerance and SOB. Patient denies vision changes, chest pain, nausea, vomiting, or urinary symptoms. Patient's last BM was  which is typical for her to not go for a couple of days.    Objective:     Vitals:   Temp (24hrs), Av.6 °F (36.4 °C), Min:97.2 °F (36.2 °C), Max:97.9 °F (36.6 °C)    Temp:  [97.2 °F (36.2 °C)-97.9 °F (36.6 °C)] 97.5 °F (36.4 °C)  HR:  [86-99] 91  Resp:  [12-18] 18  BP: (116-175)/(66-92) 126/71  SpO2:  [92 %-98 %] 92 %  Body mass index is 26.99 kg/m².     Input and Output Summary (last 24 hours):   No intake or output data in the 24 hours ending 24 6318    Physical Exam:   Physical Exam  Vitals and nursing note reviewed.   Constitutional:       General: She is not in acute distress.     Appearance: She is well-developed.   HENT:      Head: Normocephalic and atraumatic.     Eyes:      Conjunctiva/sclera: Conjunctivae normal.   Cardiovascular:      Rate and Rhythm: Normal rate and regular rhythm.      Heart sounds: No murmur heard.  Pulmonary:      Effort: Pulmonary effort is normal.  No respiratory distress.      Breath sounds: Wheezing present.   Abdominal:      Palpations: Abdomen is soft.      Tenderness: There is no abdominal tenderness.   Musculoskeletal:         General: No swelling.      Cervical back: Neck supple.   Skin:     General: Skin is warm and dry.      Capillary Refill: Capillary refill takes less than 2 seconds.   Neurological:      General: No focal deficit present.      Mental Status: She is alert.   Psychiatric:         Mood and Affect: Mood normal.          Additional Data:     Labs:  Results from last 7 days   Lab Units 06/18/24  0557 06/17/24  1840   WBC Thousand/uL 4.44 4.80   HEMOGLOBIN g/dL 12.8 11.6   HEMATOCRIT % 40.2 36.3   PLATELETS Thousands/uL 261 209   BANDS PCT % 1  --    SEGS PCT %  --  60   LYMPHO PCT % 7* 25   MONO PCT % 0* 11   EOS PCT % 0 3     Results from last 7 days   Lab Units 06/18/24  0557 06/17/24  1840   SODIUM mmol/L 136 135   POTASSIUM mmol/L 4.6 3.7   CHLORIDE mmol/L 107 102   CO2 mmol/L 24 27   BUN mg/dL 15 12   CREATININE mg/dL 0.75 0.79   ANION GAP mmol/L 5 6   CALCIUM mg/dL 8.9 8.6   ALBUMIN g/dL  --  3.7   TOTAL BILIRUBIN mg/dL  --  0.38   ALK PHOS U/L  --  66   ALT U/L  --  20   AST U/L  --  21   GLUCOSE RANDOM mg/dL 152* 109                       Lines/Drains:  Invasive Devices       Peripheral Intravenous Line  Duration             Peripheral IV 06/18/24 Dorsal (posterior);Left;Proximal Forearm <1 day                          Imaging: Reviewed radiology reports from this admission including: CT head    Recent Cultures (last 7 days):         Last 24 Hours Medication List:   Current Facility-Administered Medications   Medication Dose Route Frequency Provider Last Rate    acetaminophen  650 mg Oral Q6H PRN SHRAVAN Nelson      albuterol  2 puff Inhalation Q6H PRN SHRAVAN Nelson      calcium carbonate-vitamin D  1 tablet Oral Daily With Breakfast SHRAVAN Nelson      cyclobenzaprine  10 mg Oral Q8H Radha HALEY  Ujvary, CRNP      diphenhydrAMINE  25 mg Intravenous Q8H [facility] Ross Jeffers Luis Alberto, CRNP      docusate sodium  100 mg Oral BID Radha A Ujvary, CRNP      DULoxetine  60 mg Oral Daily Radha A Ujvary, CRNP      enoxaparin  40 mg Subcutaneous Daily Radha A Ujvary, CRNP      gabapentin  300 mg Oral BID Radha A Ujvary, CRNP      ketorolac  30 mg Intravenous Q8H KIRBY Radha A Ujvary, CRNP      levothyroxine  50 mcg Oral Early Morning Radha A Ujvary, CRNP      loperamide  2 mg Oral 4x Daily PRN Radha A Ujvary, CRNP      metoclopramide  10 mg Intravenous Q8H [facility] Ross Addisonolomew, CRNP      midodrine  5 mg Oral Daily Radha A Ujvary, CRNP      ondansetron  4 mg Intravenous Q6H PRN Radha A Ujvary, CRNP      pantoprazole  40 mg Oral Early Morning Radha A Ujvary, CRNP      polyethylene glycol  17 g Oral Daily Radha A Ujvary, CRNP      QUEtiapine  100 mg Oral HS Radha A Ujvary, CRNP      sodium chloride  50 mL/hr Intravenous Continuous Faith Revankar, DO 50 mL/hr (06/18/24 1034)        Today, Patient Was Seen By: Tiffany Ott    **Please Note: This note may have been constructed using a voice recognition system.**

## 2024-06-18 NOTE — DISCHARGE SUMMARY
"[facility]  Discharge- Ina Tolbert 1955, 68 y.o. female MRN: [id number]  Unit/Bed#: [address] [place] [address] Encounter: [id number]  Primary Care Provider: Tiffany Kamara MD   Date and time admitted to hospital: 6/17/2024  5:48 PM    * Acute intractable headache  Assessment & Plan  POA a sudden onset of severe headache on right side,  above right ear.  She states the headache started suddenly at around 2 PM today while sitting in the car getting ready for the next patient she was going to see.  States it hurts to touch.  Denies double vision,nausea, and vomiting, & dizziness.  She also noted b/l lower leg weakness and unsteadiness. Denies numbness and tingling.  Denies URI symptoms.  Does states she had a headache last night and it resolved after taking Tylenol PM.   CTA of head and neck: Significantly narrowed caliber right vertebral artery from its origin throughout the neck and pre-PICA intracranial segment with more robust flow in the post PICA intracranial segment, grossly stable from 11/24/2022, new from 10/20/2020. No stenosis in the left vertebral and bilateral carotid arteries. Patent major vessels of the [place] without significant stenosis  CRP 11.3 sed rate 23  History of focal migraines and follows with neurology as an outpatient  No focal neurodeficits noted,  ataxia and b/l weakness noted in an lower extremities in the ED. Provided with migraine cocktail in ED.  Suspect complex migraine  Neurology consulted appreciate recommendations. Patient received a dose of IV magnesium, Toradol and po Flexeril  q8 hr which resolved the headache.    Rash  Assessment & Plan  Patient noticed she has tender \"bumps\" on the right side of her head yesterday. Denies new products or recent trauma. Patient has a tender 1 cm round spot on her scalp, the spot is slightly raised and more tan in coloration compared to the rest of the scalp. Does not itch or produce discharge.  Dermatology " consulted, input appreciated.  Follow up with dermatology outpatient    Anxiety and depression  Assessment & Plan  Patient notes that her  recently started cancer treatment and her job can be stressful at times. Patient feels like she has been dealing with these stressors well.  Continue home regimen Cymbalta and Seroquel   QTc 474    Mild asthma  Assessment & Plan  Patient states she feels SOB today while laying in bed. Normally patient only needs inhaler due to exertional activities. Minimal wheezing on exam.  Albuterol nebulizer given, symptoms improved  Continue home regimen of PRN albuterol    Orthostatic hypotension  Assessment & Plan  History of orthostatic blood pressure  Continue home regimen midodrine 5 mg daily      Radiculopathy of lumbar region  Assessment & Plan  Home regimen: Cymbalta and Neurontin continue    Irritable bowel syndrome  Assessment & Plan  Chronic & follows with GI  Last BM 6/16  Continue home regimen Imodium      Medical Problems       Resolved Problems  Date Reviewed: 6/18/2024   None       Discharging Physician / Practitioner: Tiffany Ott  PCP: Tiffany Kamara MD  Admission Date:   Admission Orders (From admission, onward)       Ordered        06/17/24 2105  Place in Observation  Once                          Discharge Date: 06/18/24    Consultations During Hospital Stay:  Neurology, dermatology    Procedures Performed:   None    Significant Findings / Test Results:   CTA head and neck: No acute intracranial abnormality on CT. CTA showed significantly narrowed caliber right vertebral artery from its origin throughout the neck and pre-PICA intracranial segment with more robust flow in the post PICA intracranial segment, grossly stable from 11/24/2022, new from 10/20/2020. No stenosis in the left vertebral and bilateral carotid arteries. Patent major vessels of the [place] of [place] without significant stenosis.    Incidental Findings:   None     Test Results Pending at  "Discharge (will require follow up):   MRSA culture     Outpatient Tests Requested:  None    Complications:  None    Reason for Admission: Right sided headache    Hospital Course:   Ina Tolbert is a 68 y.o. female patient who originally presented to the hospital on 6/17/2024 due to a intense right sided headache and \"lump\" on the side of her head. Patient had no history of trauma. No gross focal neurologic deficits. CTA head and neck showed no significant changes when compared to previous imaging. Neurology was consulted and managed patient's headache with benadryl, Toradol and flexeril Q8 which relieved the headache. Dermatology was consulted regarding the tender rash on her scalp. Patient is to follow up with dermatology outpatient for further evaluation.      The patient, initially admitted to the hospital as inpatient, was discharged earlier than expected given the following: resolution of headache. Patient is stable and able to follow up outpatient regarding scalp tenderness.    Please see above list of diagnoses and related plan for additional information.     Condition at Discharge: stable    Discharge Day Visit / Exam:   * Please refer to separate progress note for these details *    Discussion with Family: Updated  () at bedside.    Discharge instructions/Information to patient and family:   See after visit summary for information provided to patient and family.      Provisions for Follow-Up Care:  See after visit summary for information related to follow-up care and any pertinent home health orders.      Mobility at time of Discharge:   Basic Mobility Inpatient Raw Score: 24  JH-HLM Goal: 8: Walk 250 feet or more  JH-HLM Achieved: 8: Walk 250 feet ot more  HLM Goal achieved. Continue to encourage appropriate mobility.     Disposition:   Home    Planned Readmission: None     Discharge Statement:  I spent >30 minutes discharging the patient. This time was spent on the day of discharge. " I had direct contact with the patient on the day of discharge. Greater than 50% of the total time was spent examining patient, answering all patient questions, arranging and discussing plan of care with patient as well as directly providing post-discharge instructions.  Additional time then spent on discharge activities.    Discharge Medications:  See after visit summary for reconciled discharge medications provided to patient and/or family.      **Please Note: This note may have been constructed using a voice recognition system**

## 2024-06-18 NOTE — H&P
[facility]  H&P  Name: Ina Tolbert 68 y.o. female I MRN: [id number]  Unit/Bed#: [address] [place] [address] Date of Admission: 6/17/2024   Date of Service: 6/18/2024 I Hospital Day: 0      Assessment & Plan   * Acute intractable headache  Assessment & Plan  POA a sudden onset of severe headache on right side,  above right ear.  She states the headache started suddenly at around 2 PM today while sitting in the car getting ready for the next patient she was going to see.  States it hurts to touch.  Denies double vision,nausea, and vomiting, & dizziness.  She also noted b/l lower leg weakness and unsteadiness. Denies numbness and tingling.  Denies URI symptoms.  Does states she had a headache last night and it resolved after taking Tylenol PM.   CTA of head and neck: Significantly narrowed caliber right vertebral artery from its origin throughout the neck and pre-PICA intracranial segment with more robust flow in the post PICA intracranial segment, grossly stable from 11/24/2022, new from 10/20/2020. No stenosis in the left vertebral and bilateral carotid arteries. Patent major vessels of the [place] without significant stenosis  History of focal migraines and follows with neurology as an outpatient  No focal neurodeficits noted,  ataxia and b/l weakness noted in an lower extremities  Suspect complex migraine  CRP 11.3 sed rate 23  In ED, migraine cocktail: decadron 10 mg IV, benadryl 25 mg IV, and reglan 10 mg IV - also morphine 4 mg Iv x 1 without relief  Neurology consulted appreciate recommendations  Monitor neurostatus q4 hr  Will give a dose of magnesium 2 g IV, start IV Toradol 30 mg  and start  po Flexeril 10 mg q8 hr  Continue IV hydration, normal saline at 125 ml/hr      Anxiety and depression  Assessment & Plan  Home r regimen Cymbalta and Seroquel continue   Obtain EKG check prolonged QTc   QTc 474    Orthostatic hypotension  Assessment & Plan  Blood pressure stable  History of  orthostatic blood pressure  Home regimen midodrine 5 mg daily, continue  Obtain orthostatic blood pressures x1    Radiculopathy of lumbar region  Assessment & Plan  Home regimen: Cymbalta and Neurontin continue    Irritable bowel syndrome  Assessment & Plan  Chronic & follows with GI  Denies any diarrhea  Home regimen Imodium prn continue         VTE Pharmacologic Prophylaxis:   High Risk (Score >/= 5) - Pharmacological DVT Prophylaxis Ordered: enoxaparin (Lovenox). Sequential Compression Devices Ordered.  Code Status: Level 1 - Full Code with patient  Discussion with family: Patient declined call to .     Anticipated Length of Stay: Patient will be admitted on an observation basis with an anticipated length of stay of less than 2 midnights secondary to intractable headache, ataxia and b/ generalized lower leg weakness, suspect migraine neurology consult & PT consulted.    Total Time Spent on Date of Encounter in care of patient: mins. This time was spent on one or more of the following: performing physical exam; counseling and coordination of care; obtaining or reviewing history; documenting in the medical record; reviewing/ordering tests, medications or procedures; communicating with other healthcare professionals and discussing with patient's family/caregivers.    Chief Complaint: severe right sided headache    History of Present Illness:  Ina Tolbert is a 68 y.o. female with a PMH of anxiety, Crohn's disease, depression, fibromyalgia, hyperlipidemia, hypothyroidism  who presents with acute right sided headache. Patient states she had a sudden onset of headache on right side above ear that started suddenly at around 2 PM today while sitting in the car getting ready for the next patient she was going to see.  States the area hurts to touch.  Denies double vision, nausea or vomiting.  She noted b/l lower leg weakness while walking early and that she is unsteady on her feet.  Denies dizziness,  numbness and tingling.  Denies URI symptoms.  Does states she had a headache last night and it resolved after taking Tylenol PM.     Review of Systems:  Review of Systems   Constitutional:  Positive for activity change. Negative for chills and fever.   HENT:  Negative for ear pain and sore throat.    Eyes:  Negative for pain and visual disturbance.   Respiratory: Negative.  Negative for cough and shortness of breath.    Cardiovascular: Negative.  Negative for chest pain and palpitations.   Gastrointestinal:  Negative for abdominal pain and vomiting.   Genitourinary:  Negative for dysuria and hematuria.   Musculoskeletal:  Negative for arthralgias and back pain.   Skin:  Negative for color change and rash.   Neurological:  Positive for weakness and headaches. Negative for dizziness, tremors, seizures, syncope, speech difficulty and numbness.   All other systems reviewed and are negative.      Past Medical and Surgical History:   Past Medical History:   Diagnosis Date    Abdominal adhesions     Alcoholism (Former[facility]) 1996    in remission    Anesthesia complication     difficult to wake up    Anxiety     Arthritis     Asthma     with exertion    Cancer ([facility])     melanoma    Colon polyp     Crohn's disease ([facility])     Depression     Depression     Disease of thyroid gland     Fibromyalgia     Fibromyalgia, primary     Fractures 2006    GERD (gastroesophageal reflux disease)     History of cholecystectomy 09/07/2019    Hyperlipidemia     Infected tooth 01/2024    Tooth #14-was treated with antibiotics-endodontal appt 2/1 may need root canal    Irregular heart beat     can be tachy; also has orthoststic hypotension    Irritable bowel syndrome 1990    Lazy eye     resolved: 3/27/17    Medical clearance for psychiatric admission 07/13/2021    Melanoma (HCC) 2010    Mild asthma 11/04/2020    Osteoarthritis 07/2018    Osteopenia     with joint pain-elevated DEV    Polymyalgia ([facility])     Psychiatric disorder     Shortness of breath      assoc with tachycardia    Stomach disorder 1995    Tinnitus     Wears glasses     for reading       Past Surgical History:   Procedure Laterality Date    ABDOMINAL SURGERY      lysis of adhesions x 2    APPENDECTOMY      CERVICAL FUSION      May 5,2021 and Jan. 01,2020    CHOLECYSTECTOMY      open    COLONOSCOPY      DILATION AND CURETTAGE OF UTERUS      FOOT SURGERY  05/2017    NECK SURGERY  01/2019 5/2021    NEUROMA EXCISION Right 05/26/2017    Procedure: EXCISION MASS / FIBROMA FOOT;  Surgeon: Jorden Crespo DPM;  Location: [facility] MAIN OR;  Service:     PARS PLANA VITRECTOMY W/ REPAIR OF MACULAR HOLE  12/23/2020    KY ARTHRP KNE CONDYLE&PLATU MEDIAL&LAT COMPARTMENTS Right 2/6/2024    Procedure: ARTHROPLASTY KNEE TOTAL W ROBOT - RIGHT - PRESS FIT - OVERNIGHT;  Surgeon: Jairon Kim DO;  Location: [facility];  Service: Orthopedics    KY XCAPSL CTRC RMVL INSJ IO LENS PROSTH W/O ECP Left 12/06/2021    Procedure: EXTRACTION EXTRACAPSULAR CATARACT PHACO INTRAOCULAR LENS (IOL);  Surgeon: Mandeep Chavez MD;  Location: [facility] MAIN OR;  Service: Ophthalmology    RIGHT OOPHORECTOMY      UPPER GASTROINTESTINAL ENDOSCOPY  5/2019    WISDOM TOOTH EXTRACTION      x4       Meds/Allergies:  Prior to Admission medications    Medication Sig Start Date End Date Taking? Authorizing Provider   albuterol (ProAir HFA) 90 mcg/act inhaler Inhale 2 puffs every 6 (six) hours as needed for wheezing 12/29/23   Edward Lobo MD   amoxicillin (AMOXIL) 500 MG tablet Take 4 tablets (2,000 mg total) by mouth 60 minutes pre-procedure 4/15/24 7/14/24  Blaze Botello PA-C   Calcium Carb-Cholecalciferol 600-12.5 MG-MCG CAPS Take by mouth daily in the early morning    Historical Provider, MD   docusate sodium (COLACE) 100 mg capsule Take 1 capsule (100 mg total) by mouth 2 (two) times a day 2/7/24   Blaze Botello PA-C   DULoxetine (CYMBALTA) 60 mg delayed release capsule Take 1 capsule (60 mg total) by mouth daily 2/27/24    Callie Guidry, PhD   esomeprazole (NexIUM) 40 MG capsule TAKE 1 CAPSULE BY MOUTH EVERY DAY BEFORE BREAKFAST 6/3/24   Leonard Lester MD   famotidine (PEPCID) 20 mg tablet Take 1 tablet (20 mg total) by mouth daily as needed for heartburn 4/19/24   Lisa Combs DO   gabapentin (NEURONTIN) 300 mg capsule TAKE 1 CAPSULE (300 MG TOTAL) BY MOUTH 2 TIMES A DAY. 6/17/24   SHRAVAN Thakur   levothyroxine 50 mcg tablet TAKE 1 TABLET BY MOUTH EVERY DAY 10/30/23   SHRAVAN Reeder   loperamide (IMODIUM) 2 mg capsule Take 1 capsule (2 mg total) by mouth 4 (four) times a day as needed for diarrhea 4/19/24   Lisa Combs DO   midodrine (PROAMATINE) 5 mg tablet Take 5 mg by mouth daily 4/29/24   Historical Provider, MD   QUEtiapine (SEROquel) 100 mg tablet Take 1 tablet (100 mg total) by mouth daily at bedtime 2/27/24   Callie Guidry, PhD   Sodium Fluoride 5000 PPM 1.1 % PSTE USE ONCE DAILY PREFERABLY AT NIGHT 4/4/23   Historical Provider, MD   gabapentin (NEURONTIN) 300 mg capsule Take 1 capsule (300 mg total) by mouth 2 (two) times a day 11/20/23 6/17/24  SHRAVAN Thakur     I have reviewed home medications with patient personally.    Allergies:   Allergies   Allergen Reactions    Meperidine Lightheadedness and Other (See Comments)    Sulfa Antibiotics Hives       Social History:  Marital Status: /Civil Union   Occupation: Employed  Patient Pre-hospital Living Situation: Home  Patient Pre-hospital Level of Mobility: walks  Patient Pre-hospital Diet Restrictions: None  Substance Use History:   Social History     Substance and Sexual Activity   Alcohol Use Not Currently     Social History     Tobacco Use   Smoking Status Never    Passive exposure: Never   Smokeless Tobacco Never     Social History     Substance and Sexual Activity   Drug Use No       Family History:  Family History   Problem Relation Age of Onset    Heart disease Mother     Stroke Mother 62    COPD  "Mother     Arthritis Mother     Asthma Mother     Hypertension Father     Dislocations Sister     Multiple sclerosis Sister     Neurological problems Sister     Scoliosis Sister     Depression Sister     Mental illness Sister     No Known Problems Maternal Grandmother     No Known Problems Maternal Grandfather     No Known Problems Paternal Grandmother     No Known Problems Paternal Grandfather     Kidney disease Brother         kidney transplant    No Known Problems Maternal Aunt     No Known Problems Maternal Uncle     No Known Problems Paternal Aunt     No Known Problems Paternal Uncle     ADD / ADHD Neg Hx     Anesthesia problems Neg Hx     Cancer Neg Hx     Clotting disorder Neg Hx     Collagen disease Neg Hx     Diabetes Neg Hx     Dislocations Neg Hx     Learning disabilities Neg Hx     Neurological problems Neg Hx     Osteoporosis Neg Hx     Rheumatologic disease Neg Hx     Scoliosis Neg Hx     Vascular Disease Neg Hx        Physical Exam:     Vitals:   Blood Pressure: 121/66 (06/18/24 0407)  Pulse: 86 (06/18/24 0407)  Temperature: 97.5 °F (36.4 °C) (06/18/24 0407)  Temp Source: Oral (06/18/24 0407)  Respirations: 12 (06/18/24 0407)  Height: 5' 5\" (165.1 cm) (06/17/24 2254)  Weight - Scale: 73.6 kg (162 lb 3.2 oz) (06/17/24 2254)  SpO2: 92 % (06/18/24 0407)    Physical Exam  Vitals and nursing note reviewed.   Constitutional:       General: She is not in acute distress.     Appearance: Normal appearance.   HENT:      Head: Normocephalic.   Cardiovascular:      Rate and Rhythm: Normal rate and regular rhythm.      Heart sounds: No murmur heard.  Pulmonary:      Effort: Pulmonary effort is normal.      Breath sounds: Normal breath sounds. No wheezing, rhonchi or rales.   Abdominal:      General: Bowel sounds are normal.      Palpations: Abdomen is soft.      Tenderness: There is no abdominal tenderness.   Musculoskeletal:      Right lower leg: No edema.      Left lower leg: No edema.   Skin:     General: Skin " is warm and dry.   Neurological:      General: No focal deficit present.      Mental Status: She is alert. Mental status is at baseline.      GCS: GCS eye subscore is 4. GCS verbal subscore is 5. GCS motor subscore is 6.      Cranial Nerves: No cranial nerve deficit, dysarthria or facial asymmetry.      Sensory: Sensation is intact.      Motor: Weakness present.      Coordination: Coordination abnormal. Heel to Pedroza Test abnormal.      Gait: Gait abnormal.      Comments: Bilateral lower leg weakness   Psychiatric:         Mood and Affect: Mood normal.          Additional Data:     Lab Results:  Results from last 7 days   Lab Units 06/18/24  0557 06/17/24  1840   WBC Thousand/uL 4.44 4.80   HEMOGLOBIN g/dL 12.8 11.6   HEMATOCRIT % 40.2 36.3   PLATELETS Thousands/uL 261 209   BANDS PCT % 1  --    SEGS PCT %  --  60   LYMPHO PCT % 7* 25   MONO PCT % 0* 11   EOS PCT % 0 3     Results from last 7 days   Lab Units 06/18/24  0557 06/17/24  1840   SODIUM mmol/L 136 135   POTASSIUM mmol/L 4.6 3.7   CHLORIDE mmol/L 107 102   CO2 mmol/L 24 27   BUN mg/dL 15 12   CREATININE mg/dL 0.75 0.79   ANION GAP mmol/L 5 6   CALCIUM mg/dL 8.9 8.6   ALBUMIN g/dL  --  3.7   TOTAL BILIRUBIN mg/dL  --  0.38   ALK PHOS U/L  --  66   ALT U/L  --  20   AST U/L  --  21   GLUCOSE RANDOM mg/dL 152* 109             Lab Results   Component Value Date    HGBA1C 6.6 (H) 01/18/2024    HGBA1C 6.1 (H) 02/06/2020    HGBA1C 6.0 02/14/2019           Lines/Drains:  Invasive Devices       Peripheral Intravenous Line  Duration             Peripheral IV 06/18/24 Distal;Right;Upper;Ventral (anterior) Arm <1 day                        Imaging: Reviewed radiology reports from this admission including: CTA without contrast  CTA head and neck with and without contrast   Final Result by Bang Romano MD (06/17 1917)      CT Brain:  No acute intracranial CT abnormality.      CT Angiography:      1.  Significantly narrowed caliber right vertebral artery from its origin  throughout the neck and pre-PICA intracranial segment with more robust flow in the post PICA intracranial segment, grossly stable from 11/24/2022, new from 10/20/2020.   2.  No stenosis in the left vertebral and bilateral carotid arteries.   3.  Patent major vessels of the [place] without significant stenosis.                        Workstation performed: [id number]             EKG and Other Studies Reviewed on Admission:   EKG: Personally Reviewed. NSR. HR normal sinus rhythm rate 84.    ** Please Note: This note has been constructed using a voice recognition system. **

## 2024-06-18 NOTE — PLAN OF CARE
Problem: PAIN - ADULT  Goal: Verbalizes/displays adequate comfort level or baseline comfort level  Description: Interventions:  - Encourage patient to monitor pain and request assistance  - Assess pain using appropriate pain scale  - Administer analgesics based on type and severity of pain and evaluate response  - Implement non-pharmacological measures as appropriate and evaluate response  - Consider cultural and social influences on pain and pain management  - Notify physician/advanced practitioner if interventions unsuccessful or patient reports new pain  Outcome: Progressing     Problem: INFECTION - ADULT  Goal: Absence or prevention of progression during hospitalization  Description: INTERVENTIONS:  - Assess and monitor for signs and symptoms of infection  - Monitor lab/diagnostic results  - Monitor all insertion sites, i.e. indwelling lines, tubes, and drains  - Monitor endotracheal if appropriate and nasal secretions for changes in amount and color  - [place] appropriate cooling/warming therapies per order  - Administer medications as ordered  - Instruct and encourage patient and family to use good hand hygiene technique  - Identify and instruct in appropriate isolation precautions for identified infection/condition  Outcome: Progressing  Goal: Absence of fever/infection during neutropenic period  Description: INTERVENTIONS:  - Monitor WBC    Outcome: Progressing     Problem: SAFETY ADULT  Goal: Patient will remain free of falls  Description: INTERVENTIONS:  - Educate patient/family on patient safety including physical limitations  - Instruct patient to call for assistance with activity   - Consult OT/PT to assist with strengthening/mobility   - Keep Call bell within reach  - Keep bed low and locked with side rails adjusted as appropriate  - Keep care items and personal belongings within reach  - Initiate and maintain comfort rounds  - Make Fall Risk Sign visible to staff  - Offer Toileting every 2 Hours,  in advance of need  - Initiate/Maintain bed alarm  - Obtain necessary fall risk management equipment: bracelet/socks  - Apply yellow socks and bracelet for high fall risk patients  - Consider moving patient to room near nurses station  Outcome: Progressing  Goal: Maintain or return to baseline ADL function  Description: INTERVENTIONS:  -  Assess patient's ability to carry out ADLs; assess patient's baseline for ADL function and identify physical deficits which impact ability to perform ADLs (bathing, care of mouth/teeth, toileting, grooming, dressing, etc.)  - Assess/evaluate cause of self-care deficits   - Assess range of motion  - Assess patient's mobility; develop plan if impaired  - Assess patient's need for assistive devices and provide as appropriate  - Encourage maximum independence but intervene and supervise when necessary  - Involve family in performance of ADLs  - Assess for home care needs following discharge   - Consider OT consult to assist with ADL evaluation and planning for discharge  - Provide patient education as appropriate  Outcome: Progressing  Goal: Maintains/Returns to pre admission functional level  Description: INTERVENTIONS:  - Perform AM-PAC 6 Click Basic Mobility/ Daily Activity assessment daily.  - Set and communicate daily mobility goal to care team and patient/family/caregiver.   - Collaborate with rehabilitation services on mobility goals if consulted  - Perform Range of Motion 12 times a day.  - Reposition patient every 2 hours.  - Dangle patient 3 times a day  - Stand patient 3 times a day  - Ambulate patient 3 times a day  - Out of bed to chair 3 times a day   - Out of bed for meals 3 times a day  - Out of bed for toileting  - Record patient progress and toleration of activity level   Outcome: Progressing     Problem: DISCHARGE PLANNING  Goal: Discharge to home or other facility with appropriate resources  Description: INTERVENTIONS:  - Identify barriers to discharge w/patient and  caregiver  - Arrange for needed discharge resources and transportation as appropriate  - Identify discharge learning needs (meds, wound care, etc.)  - Arrange for interpretive services to assist at discharge as needed  - Refer to Case Management Department for coordinating discharge planning if the patient needs post-hospital services based on physician/advanced practitioner order or complex needs related to functional status, cognitive ability, or social support system  Outcome: Progressing     Problem: Knowledge Deficit  Goal: Patient/family/caregiver demonstrates understanding of disease process, treatment plan, medications, and discharge instructions  Description: Complete learning assessment and assess knowledge base.  Interventions:  - Provide teaching at level of understanding  - Provide teaching via preferred learning methods  Outcome: Progressing

## 2024-06-18 NOTE — ASSESSMENT & PLAN NOTE
Patient states she feels SOB today while laying in bed. Normally patient only needs inhaler due to exertional activities. Minimal wheezing on exam.  Albuterol nebulizer given, symptoms improved  Continue home regimen of PRN albuterol

## 2024-06-18 NOTE — ASSESSMENT & PLAN NOTE
"Patient noticed she has tender \"bumps\" on the right side of her head yesterday. Denies new products or recent trauma. Patient has a tender 1 cm round spot on her scalp, the spot is slightly raised and more tan in coloration compared to the rest of the scalp. Does not itch or produce discharge.  Dermatology consulted, input appreciated.  Follow up with dermatology outpatient  "

## 2024-06-18 NOTE — ASSESSMENT & PLAN NOTE
Patient states she feels SOB today while laying in bed. Normally patient only needs inhaler due to exertional activities. Minimal wheezing on exam.  Give albuterol nebulizer  Hold IVF

## 2024-06-18 NOTE — ED PROVIDER NOTES
History  Chief Complaint   Patient presents with    Headache     Pt reports sharp constant right sided headache, rates pain a 5.  Denies nausea/vomiting, dizziness, and lightheadedness.      Patient is a 68-year-old female with a history of anxiety, Crohn's disease, depression, fibromyalgia, hyperlipidemia, hypothyroidism that presents emergency department with complaint of right-sided headache.  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  She states that he feels as if she has a lump on her head from being hit with something.  Patient denies trauma.  Patient drove to the local urgent care, and was sent to the ED by EMS for further evaluation.      History provided by:  Patient   used: No    Headache  Associated symptoms: no abdominal pain, no back pain, no cough, no diarrhea, no fever, no nausea, no neck pain, no neck stiffness and no vomiting        Prior to Admission Medications   Prescriptions Last Dose Informant Patient Reported? Taking?   Calcium Carb-Cholecalciferol 600-12.5 MG-MCG CAPS  Self Yes No   Sig: Take by mouth daily in the early morning   DULoxetine (CYMBALTA) 60 mg delayed release capsule  Self No No   Sig: Take 1 capsule (60 mg total) by mouth daily   QUEtiapine (SEROquel) 100 mg tablet  Self No No   Sig: Take 1 tablet (100 mg total) by mouth daily at bedtime   Sodium Fluoride 5000 PPM 1.1 % PSTE  Self Yes No   Sig: USE ONCE DAILY PREFERABLY AT NIGHT   albuterol (ProAir HFA) 90 mcg/act inhaler  Self No No   Sig: Inhale 2 puffs every 6 (six) hours as needed for wheezing   amoxicillin (AMOXIL) 500 MG tablet  Self No No   Sig: Take 4 tablets (2,000 mg total) by mouth 60 minutes pre-procedure   docusate sodium (COLACE) 100 mg capsule  Self No No   Sig: Take 1 capsule (100 mg total) by mouth 2 (two) times a day   esomeprazole (NexIUM) 40 MG capsule   No No   Sig: TAKE 1 CAPSULE BY MOUTH EVERY DAY BEFORE BREAKFAST   famotidine  (PEPCID) 20 mg tablet  Self No No   Sig: Take 1 tablet (20 mg total) by mouth daily as needed for heartburn   gabapentin (NEURONTIN) 300 mg capsule   No No   Sig: TAKE 1 CAPSULE (300 MG TOTAL) BY MOUTH 2 TIMES A DAY.   levothyroxine 50 mcg tablet  Self No No   Sig: TAKE 1 TABLET BY MOUTH EVERY DAY   loperamide (IMODIUM) 2 mg capsule  Self No No   Sig: Take 1 capsule (2 mg total) by mouth 4 (four) times a day as needed for diarrhea   midodrine (PROAMATINE) 5 mg tablet  Self Yes No   Sig: Take 5 mg by mouth daily      Facility-Administered Medications: None       Past Medical History:   Diagnosis Date    Abdominal adhesions     Alcoholism (HCC) 1996    in remission    Anesthesia complication     difficult to wake up    Anxiety     Arthritis     Asthma     with exertion    Cancer (HCC)     melanoma    Colon polyp     Crohn's disease (HCC)     Depression     Depression     Disease of thyroid gland     Fibromyalgia     Fibromyalgia, primary     Fractures 2006    GERD (gastroesophageal reflux disease)     History of cholecystectomy 09/07/2019    Hyperlipidemia     Infected tooth 01/2024    Tooth #14-was treated with antibiotics-endodontal appt 2/1 may need root canal    Irregular heart beat     can be tachy; also has orthoststic hypotension    Irritable bowel syndrome 1990    Lazy eye     resolved: 3/27/17    Medical clearance for psychiatric admission 07/13/2021    Melanoma (HCC) 2010    Mild asthma 11/04/2020    Osteoarthritis 07/2018    Osteopenia     with joint pain-elevated DEV    Polymyalgia (HCC)     Psychiatric disorder     Shortness of breath     assoc with tachycardia    Stomach disorder 1995    Tinnitus     Wears glasses     for reading       Past Surgical History:   Procedure Laterality Date    ABDOMINAL SURGERY      lysis of adhesions x 2    APPENDECTOMY      CERVICAL FUSION      May 5,2021 and Jan. 01,2020    CHOLECYSTECTOMY      open    COLONOSCOPY      DILATION AND CURETTAGE OF UTERUS      FOOT SURGERY   05/2017    NECK SURGERY  01/2019 5/2021    NEUROMA EXCISION Right 05/26/2017    Procedure: EXCISION MASS / FIBROMA FOOT;  Surgeon: Jorden Crespo DPM;  Location: [facility];  Service:     PARS PLANA VITRECTOMY W/ REPAIR OF MACULAR HOLE  12/23/2020    VT ARTHRP KNE CONDYLE&PLATU MEDIAL&LAT COMPARTMENTS Right 2/6/2024    Procedure: ARTHROPLASTY KNEE TOTAL W ROBOT - RIGHT - PRESS FIT - OVERNIGHT;  Surgeon: Jairon Kim DO;  Location: [facility];  Service: Orthopedics    VT XCAPSL CTRC RMVL INSJ IO LENS PROSTH W/O ECP Left 12/06/2021    Procedure: EXTRACTION EXTRACAPSULAR CATARACT PHACO INTRAOCULAR LENS (IOL);  Surgeon: Mandeep Chavez MD;  Location: [facility] MAIN OR;  Service: Ophthalmology    RIGHT OOPHORECTOMY      UPPER GASTROINTESTINAL ENDOSCOPY  5/2019    WISDOM TOOTH EXTRACTION      x4       Family History   Problem Relation Age of Onset    Heart disease Mother     Stroke Mother 62    COPD Mother     Arthritis Mother     Asthma Mother     Hypertension Father     Dislocations Sister     Multiple sclerosis Sister     Neurological problems Sister     Scoliosis Sister     Depression Sister     Mental illness Sister     No Known Problems Maternal Grandmother     No Known Problems Maternal Grandfather     No Known Problems Paternal Grandmother     No Known Problems Paternal Grandfather     Kidney disease Brother         kidney transplant    No Known Problems Maternal Aunt     No Known Problems Maternal Uncle     No Known Problems Paternal Aunt     No Known Problems Paternal Uncle     ADD / ADHD Neg Hx     Anesthesia problems Neg Hx     Cancer Neg Hx     Clotting disorder Neg Hx     Collagen disease Neg Hx     Diabetes Neg Hx     Dislocations Neg Hx     Learning disabilities Neg Hx     Neurological problems Neg Hx     Osteoporosis Neg Hx     Rheumatologic disease Neg Hx     Scoliosis Neg Hx     Vascular Disease Neg Hx      I have reviewed and agree with the history as documented.    E-Cigarette/Vaping    E-Cigarette Use  Never User      E-Cigarette/Vaping Substances    Nicotine No     THC No     CBD No     Flavoring No     Other No     Unknown No      Social History     Tobacco Use    Smoking status: Never     Passive exposure: Never    Smokeless tobacco: Never   Vaping Use    Vaping status: Never Used   Substance Use Topics    Alcohol use: Not Currently    Drug use: No       Review of Systems   Constitutional:  Negative for chills and fever.   Respiratory:  Negative for cough, chest tightness and shortness of breath.    Gastrointestinal:  Negative for abdominal pain, diarrhea, nausea and vomiting.   Genitourinary:  Negative for dysuria, frequency, hematuria and urgency.   Musculoskeletal:  Negative for back pain, neck pain and neck stiffness.   Neurological:  Positive for headaches. Negative for syncope and speech difficulty.   Psychiatric/Behavioral:  The patient is nervous/anxious.    All other systems reviewed and are negative.      Physical Exam  Physical Exam  Vitals and nursing note reviewed.   Constitutional:       General: She is not in acute distress.     Appearance: She is well-developed. She is not diaphoretic.   HENT:      Head: Normocephalic and atraumatic.      Comments: No visible rash noted on my exam to right parietal aspect of scalp, where patient is significantly tender     Right Ear: Tympanic membrane normal.      Left Ear: Tympanic membrane normal.   Eyes:      General: No visual field deficit.     Extraocular Movements: Extraocular movements intact.      Conjunctiva/sclera: Conjunctivae normal.      Pupils: Pupils are equal, round, and reactive to light.   Cardiovascular:      Rate and Rhythm: Normal rate and regular rhythm.      Heart sounds: Normal heart sounds. No murmur heard.  Pulmonary:      Effort: Pulmonary effort is normal. No respiratory distress.      Breath sounds: Normal breath sounds.   Abdominal:      General: Bowel sounds are normal. There is no distension.      Palpations: Abdomen is soft.       Tenderness: There is no abdominal tenderness.   Musculoskeletal:         General: No deformity. Normal range of motion.      Cervical back: Normal range of motion and neck supple.   Skin:     General: Skin is warm and dry.      Capillary Refill: Capillary refill takes less than 2 seconds.      Coloration: Skin is not pale.      Findings: No rash.   Neurological:      Mental Status: She is alert and oriented to person, place, and time.      GCS: GCS eye subscore is 4. GCS verbal subscore is 5. GCS motor subscore is 6.      Cranial Nerves: No cranial nerve deficit, dysarthria or facial asymmetry.      Sensory: Sensation is intact.      Motor: Motor function is intact.      Coordination: Coordination abnormal.      Comments: Ataxic gait noted   Psychiatric:         Behavior: Behavior normal.         Vital Signs  ED Triage Vitals   Temperature Pulse Respirations Blood Pressure SpO2   06/17/24 1758 06/17/24 1751 06/17/24 1751 06/17/24 1751 06/17/24 1752   97.8 °F (36.6 °C) 92 16 138/92 98 %      Temp src Heart Rate Source Patient Position - Orthostatic VS BP Location FiO2 (%)   -- 06/17/24 1927 06/17/24 1927 06/17/24 1927 --    Monitor Lying Right arm       Pain Score       06/17/24 1849       5           Vitals:    06/17/24 2000 06/17/24 2030 06/17/24 2100 06/17/24 2130   BP: 143/87 129/74 150/79 134/75   Pulse: 96 94 94 90   Patient Position - Orthostatic VS: Lying Lying Lying Lying         Visual Acuity  Visual Acuity      Flowsheet Row Most Recent Value   L Pupil Size (mm) 3   R Pupil Size (mm) 3            ED Medications  Medications   iohexol (OMNIPAQUE) 350 MG/ML injection (MULTI-DOSE) 85 mL (85 mL Intravenous Given 6/17/24 1828)   morphine injection 4 mg (4 mg Intravenous Given 6/17/24 1857)   dexamethasone (PF) (DECADRON) injection 10 mg (10 mg Intravenous Given 6/17/24 2109)   metoclopramide (REGLAN) injection 10 mg (10 mg Intravenous Given 6/17/24 2109)   diphenhydrAMINE (BENADRYL) injection 25 mg (25 mg  Intravenous Given 6/17/24 2109)       Diagnostic Studies  Results Reviewed       Procedure Component Value Units Date/Time    Rapid drug screen, urine [[id number]]  (Abnormal) Collected: 06/17/24 1926    Lab Status: Final result Specimen: Urine, Clean Catch Updated: 06/17/24 2008     Amph/Meth UR Negative     Barbiturate Ur Negative     Benzodiazepine Urine Negative     Cocaine Urine Negative     Methadone Urine Negative     Opiate Urine Positive     PCP Ur Negative     THC Urine Negative     Oxycodone Urine Negative     Fentanyl Urine Negative     HYDROCODONE URINE Negative    Narrative:      Presumptive report. If requested, specimen will be sent to reference lab for confirmation.  FOR MEDICAL PURPOSES ONLY.   IF CONFIRMATION NEEDED PLEASE CONTACT THE LAB WITHIN 5 DAYS.    Drug Screen Cutoff Levels:  AMPHETAMINE/METHAMPHETAMINES  1000 ng/mL  BARBITURATES     200 ng/mL  BENZODIAZEPINES     200 ng/mL  COCAINE      300 ng/mL  METHADONE      300 ng/mL  OPIATES      300 ng/mL  PHENCYCLIDINE     25 ng/mL  THC       50 ng/mL  OXYCODONE      100 ng/mL  FENTANYL      5 ng/mL  HYDROCODONE     300 ng/mL    UA (URINE) with reflex to Scope [[id number]] Collected: 06/17/24 1926    Lab Status: Final result Specimen: Urine, Clean Catch Updated: 06/17/24 1940     Color, UA Yellow     Clarity, UA Clear     Specific Gravity, UA 1.015     pH, UA 7.0     Leukocytes, UA Negative     Nitrite, UA Negative     Protein, UA Negative mg/dl      Glucose, UA Negative mg/dl      Ketones, UA Negative mg/dl      Urobilinogen, UA <2.0 mg/dl      Bilirubin, UA Negative     Occult Blood, UA Negative    HS Troponin 0hr (reflex protocol) [[id number]]  (Normal) Collected: 06/17/24 1840    Lab Status: Final result Specimen: Blood from Arm, Left Updated: 06/17/24 1915     hs TnI 0hr 3 ng/L     Ethanol [[id number]]  (Normal) Collected: 06/17/24 1840    Lab Status: Final result Specimen: Blood from Arm, Left Updated: 06/17/24 1909     Ethanol Lvl <10 mg/dL      Comprehensive metabolic panel [[id number]]  (Abnormal) Collected: 06/17/24 1840    Lab Status: Final result Specimen: Blood from Arm, Left Updated: 06/17/24 1909     Sodium 135 mmol/L      Potassium 3.7 mmol/L      Chloride 102 mmol/L      CO2 27 mmol/L      ANION GAP 6 mmol/L      BUN 12 mg/dL      Creatinine 0.79 mg/dL      Glucose 109 mg/dL      Calcium 8.6 mg/dL      AST 21 U/L      ALT 20 U/L      Alkaline Phosphatase 66 U/L      Total Protein 6.0 g/dL      Albumin 3.7 g/dL      Total Bilirubin 0.38 mg/dL      eGFR 77 ml/min/1.73sq m     Narrative:      National Kidney Disease Foundation guidelines for Chronic Kidney Disease (CKD):     Stage 1 with normal or high GFR (GFR > 90 mL/min/1.73 square meters)    Stage 2 Mild CKD (GFR = 60-89 mL/min/1.73 square meters)    Stage 3A Moderate CKD (GFR = 45-59 mL/min/1.73 square meters)    Stage 3B Moderate CKD (GFR = 30-44 mL/min/1.73 square meters)    Stage 4 Severe CKD (GFR = 15-29 mL/min/1.73 square meters)    Stage 5 End Stage CKD (GFR <15 mL/min/1.73 square meters)  Note: GFR calculation is accurate only with a steady state creatinine    Magnesium [[id number]]  (Normal) Collected: 06/17/24 1840    Lab Status: Final result Specimen: Blood from Arm, Left Updated: 06/17/24 1909     Magnesium 2.0 mg/dL     CBC and differential [[id number]] Collected: 06/17/24 1840    Lab Status: Final result Specimen: Blood from Arm, Left Updated: 06/17/24 1849     WBC 4.80 Thousand/uL      RBC 4.32 Million/uL      Hemoglobin 11.6 g/dL      Hematocrit 36.3 %      MCV 84 fL      MCH 26.9 pg      MCHC 32.0 g/dL      RDW 13.7 %      MPV 10.4 fL      Platelets 209 Thousands/uL      nRBC 0 /100 WBCs      Segmented % 60 %      Immature Grans % 0 %      Lymphocytes % 25 %      Monocytes % 11 %      Eosinophils Relative 3 %      Basophils Relative 1 %      Absolute Neutrophils 2.88 Thousands/µL      Absolute Immature Grans 0.02 Thousand/uL      Absolute Lymphocytes 1.21 Thousands/µL      Absolute  Monocytes 0.54 Thousand/µL      Eosinophils Absolute 0.12 Thousand/µL      Basophils Absolute 0.03 Thousands/µL                    CTA head and neck with and without contrast   Final Result by Bang Romano MD (06/17 1917)      CT Brain:  No acute intracranial CT abnormality.      CT Angiography:      1.  Significantly narrowed caliber right vertebral artery from its origin throughout the neck and pre-PICA intracranial segment with more robust flow in the post PICA intracranial segment, grossly stable from 11/24/2022, new from 10/20/2020.   2.  No stenosis in the left vertebral and bilateral carotid arteries.   3.  Patent major vessels of the [place] without significant stenosis.                        Workstation performed: [id number]                    Procedures  ECG 12 Lead Documentation Only    Date/Time: 6/17/2024 6:40 PM    Performed by: Tony Jiménez DO  Authorized by: Tony Jiménez DO    Indications / Diagnosis:  Weakness  ECG reviewed by me, the ED Provider: yes    Patient location:  ED  Previous ECG:     Previous ECG:  Unavailable    Comparison to cardiac monitor: Yes    Interpretation:     Interpretation: non-specific    Rate:     ECG rate:  90bpm    ECG rate assessment: normal    Rhythm:     Rhythm: sinus rhythm    Ectopy:     Ectopy: none    QRS:     QRS axis:  Normal  Conduction:     Conduction: normal    ST segments:     ST segments:  Normal  T waves:     T waves: normal             ED Course                               SBIRT 20yo+      Flowsheet Row Most Recent Value   Initial Alcohol Screen: US AUDIT-C     1. How often do you have a drink containing alcohol? 0 Filed at: 06/17/2024 1758   2. How many drinks containing alcohol do you have on a typical day you are drinking?  0 Filed at: 06/17/2024 1758   3a. Male UNDER 65: How often do you have five or more drinks on one occasion? 0 Filed at: 06/17/2024 1758   3b. FEMALE Any Age, or MALE 65+: How often do you have 4 or more  drinks on one occassion? 0 Filed at: 06/17/2024 1758   Audit-C Score 0 Filed at: 06/17/2024 1758   SHARON: How many times in the past year have you...    Used an illegal drug or used a prescription medication for non-medical reasons? Never Filed at: 06/17/2024 1758                      Medical Decision Making  68-year-old female with complaint of right-sided headache and ataxic gait.  Labs, CTA head and neck ordered and reviewed.  No acute pathology identified.  Redemonstrated significantly narrowed caliber right vertebral artery noted, stable from November 2022.  Patient's headache is unresolved with migraine cocktail.  Will admit patient to medicine for neurological evaluation.    Amount and/or Complexity of Data Reviewed  Labs: ordered.  Radiology: ordered.    Risk  Prescription drug management.  Decision regarding hospitalization.             Disposition  Final diagnoses:   Headache - complex migraine   Ataxia     Time reflects when diagnosis was documented in both MDM as applicable and the Disposition within this note       Time User Action Codes Description Comment    6/17/2024  9:04 PM Tony Jiménez Add [R51.9] Headache     6/17/2024  9:04 PM Tony Jiménez Add [R27.0] Ataxia     6/17/2024  9:04 PM Tony Jiménez Modify [R51.9] Headache - complex migraine           ED Disposition       ED Disposition   Admit    Condition   Stable    Date/Time   Mon Jun 17, 2024 2103    Comment   Case was discussed with Radha Salmon the patient's admission status was agreed to be Admission Status: observation status to the service of Dr. Campbell .               Follow-up Information    None         Patient's Medications   Discharge Prescriptions    No medications on file       No discharge procedures on file.    PDMP Review         Value Time User    PDMP Reviewed  Yes 4/23/2024  6:49 AM Callie Guidry, PhD            ED Provider  Electronically Signed by             Tony Jiménez  DO  06/18/24 1857

## 2024-06-18 NOTE — DISCHARGE INSTR - AVS FIRST PAGE
As discussed, follow-up with your primary care doctor, dermatologist right away for the scalp lesions for further management/evaluation.  You may need biopsy but this can be determined by your dermatologist

## 2024-06-18 NOTE — PLAN OF CARE
Problem: INFECTION - ADULT  Goal: Absence or prevention of progression during hospitalization  Description: INTERVENTIONS:  - Assess and monitor for signs and symptoms of infection  - Monitor lab/diagnostic results  - Monitor all insertion sites, i.e. indwelling lines, tubes, and drains  - Monitor endotracheal if appropriate and nasal secretions for changes in amount and color  - [place] appropriate cooling/warming therapies per order  - Administer medications as ordered  - Instruct and encourage patient and family to use good hand hygiene technique  - Identify and instruct in appropriate isolation precautions for identified infection/condition  Outcome: Progressing     Problem: SAFETY ADULT  Goal: Patient will remain free of falls  Description: INTERVENTIONS:  - Educate patient/family on patient safety including physical limitations  - Instruct patient to call for assistance with activity   - Consult OT/PT to assist with strengthening/mobility   - Keep Call bell within reach  - Keep bed low and locked with side rails adjusted as appropriate  - Keep care items and personal belongings within reach  - Initiate and maintain comfort rounds  - Make Fall Risk Sign visible to staff  - Offer Toileting every 2 Hours, in advance of need  - Initiate/Maintain bed alarm  - Obtain necessary fall risk management equipment: call bell   - Apply yellow socks and bracelet for high fall risk patients  - Consider moving patient to room near nurses station  Outcome: Progressing

## 2024-06-18 NOTE — UTILIZATION REVIEW
Initial Clinical Review    Admission: Date/Time/Statement:   Admission Orders (From admission, onward)       Ordered        06/17/24 2105  Place in Observation  Once                          Orders Placed This Encounter   Procedures    Place in Observation     Standing Status:   Standing     Number of Occurrences:   1     Order Specific Question:   Level of Care     Answer:   Med Surg [16]     ED Arrival Information       Expected   -    Arrival   6/17/2024 17:47    Acuity   Urgent              Means of arrival   Ambulance    Escorted by   [facility]    Service   Hospitalist    Admission type   Emergency              Arrival complaint   stroke-like symptoms             Chief Complaint   Patient presents with    Headache     Pt reports sharp constant right sided headache, rates pain a 5.  Denies nausea/vomiting, dizziness, and lightheadedness.        Initial Presentation:   68 yof to ER from Urgent Care via EMS for R sided headache, states that he feels as if she has a lump on her head from being hit with something. Hx anxiety, Crohn's disease, depression, fibromyalgia, hyperlipidemia, hypothyroidism. Presents with headache, abnormal coordination. Admission CT Brain:  No acute intracranial CT abnormality. CT Angiography: Significantly narrowed caliber right vertebral artery from its origin throughout the neck and pre-PICA intracranial segment with more robust flow in the post PICA intracranial segment, grossly stable from 11/24/2022, new from 10/20/2020. No stenosis in the left vertebral and bilateral carotid arteries. Patent major vessels of the [place] without significant stenosis. Labs: CRP 11.3. UDS+ opiates. Placed in observation status for acute intractable headache.        ED Triage Vitals   Temperature Pulse Respirations Blood Pressure SpO2 Pain Score   06/17/24 1758 06/17/24 1751 06/17/24 1751 06/17/24 1751 06/17/24 1752 06/17/24 1849   97.8 °F (36.6 °C) 92 16 138/92 98 % 5     Weight (last 2  days)       Date/Time Weight    06/17/24 2254 73.6 (162.2)            Vital Signs (last 3 days)       Date/Time Temp Pulse Resp BP MAP (mmHg) SpO2 O2 Device Patient Position - Orthostatic VS [place] Coma Scale Score Pain    06/18/24 0407 97.5 °F (36.4 °C) 86 12 121/66 86 92 % -- Lying -- --    06/17/24 2254 97.9 °F (36.6 °C) 90 18 134/69 95 92 % -- -- 15 8    06/17/24 2230 -- 90 18 139/89 109 94 % None (Room air) Lying -- --    06/17/24 2130 -- 90 18 134/75 89 94 % None (Room air) Lying -- --    06/17/24 2100 -- 94 16 150/79 108 93 % None (Room air) Lying -- --    06/17/24 2030 -- 94 16 129/74 95 96 % None (Room air) Lying -- --    06/17/24 2000 -- 96 18 143/87 111 96 % None (Room air) Lying -- --    06/17/24 1927 -- 91 18 175/91 125 97 % None (Room air) Lying -- --    06/17/24 1857 -- -- -- -- -- -- -- -- -- 8    06/17/24 1849 -- -- -- -- -- -- -- -- 15 5    06/17/24 1758 97.8 °F (36.6 °C) -- -- -- -- -- -- -- -- --    06/17/24 1752 -- -- -- -- -- 98 % -- -- -- --    06/17/24 1751 -- 92 16 138/92 -- -- -- -- -- --              Pertinent Labs/Diagnostic Test Results:   Radiology:  CTA head and neck with and without contrast   Final Interpretation by Bang Romano MD (06/17 1917)      CT Brain:  No acute intracranial CT abnormality.      CT Angiography:      1.  Significantly narrowed caliber right vertebral artery from its origin throughout the neck and pre-PICA intracranial segment with more robust flow in the post PICA intracranial segment, grossly stable from 11/24/2022, new from 10/20/2020.   2.  No stenosis in the left vertebral and bilateral carotid arteries.   3.  Patent major vessels of the [place] without significant stenosis.                        Workstation performed: [id number]           Cardiology:  No orders to display     GI:  No orders to display           Results from last 7 days   Lab Units 06/18/24  0557 06/17/24  1840   WBC Thousand/uL 4.44 4.80   HEMOGLOBIN g/dL 12.8 11.6   HEMATOCRIT %  "40.2 36.3   PLATELETS Thousands/uL 261 209   TOTAL NEUT ABS Thousands/µL  --  2.88   BANDS PCT % 1  --          Results from last 7 days   Lab Units 06/17/24  1840   SODIUM mmol/L 135   POTASSIUM mmol/L 3.7   CHLORIDE mmol/L 102   CO2 mmol/L 27   ANION GAP mmol/L 6   BUN mg/dL 12   CREATININE mg/dL 0.79   EGFR ml/min/1.73sq m 77   CALCIUM mg/dL 8.6   MAGNESIUM mg/dL 2.0     Results from last 7 days   Lab Units 06/17/24  1840   AST U/L 21   ALT U/L 20   ALK PHOS U/L 66   TOTAL PROTEIN g/dL 6.0*   ALBUMIN g/dL 3.7   TOTAL BILIRUBIN mg/dL 0.38         Results from last 7 days   Lab Units 06/17/24  1840   GLUCOSE RANDOM mg/dL 109             No results found for: \"BETA-HYDROXYBUTYRATE\"                   Results from last 7 days   Lab Units 06/17/24  1840   HS TNI 0HR ng/L 3                                                         Results from last 7 days   Lab Units 06/18/24  0032   CRP mg/L 11.3*   SED RATE mm/hour 23             Results from last 7 days   Lab Units 06/17/24  1926   CLARITY UA  Clear   COLOR UA  Yellow   SPEC GRAV UA  1.015   PH UA  7.0   GLUCOSE UA mg/dl Negative   KETONES UA mg/dl Negative   BLOOD UA  Negative   PROTEIN UA mg/dl Negative   NITRITE UA  Negative   BILIRUBIN UA  Negative   UROBILINOGEN UA (BE) mg/dl <2.0   LEUKOCYTES UA  Negative             Results from last 7 days   Lab Units 06/17/24  1926   AMPH/METH  Negative   BARBITURATE UR  Negative   BENZODIAZEPINE UR  Negative   COCAINE UR  Negative   METHADONE URINE  Negative   OPIATE UR  Positive*   PCP UR  Negative   THC UR  Negative     Results from last 7 days   Lab Units 06/17/24  1840   ETHANOL LVL mg/dL <10                                   ED Treatment-Medication Administration from 06/17/2024 1747 to 06/17/2024 2246         Date/Time Order Dose Route Action     06/17/2024 1828 iohexol (OMNIPAQUE) 350 MG/ML injection (MULTI-DOSE) 85 mL 85 mL Intravenous Given     06/17/2024 1857 morphine injection 4 mg 4 mg Intravenous Given     " 06/17/2024 2109 dexamethasone (PF) (DECADRON) injection 10 mg 10 mg Intravenous Given     06/17/2024 2109 metoclopramide (REGLAN) injection 10 mg 10 mg Intravenous Given     06/17/2024 2109 diphenhydrAMINE (BENADRYL) injection 25 mg 25 mg Intravenous Given            Past Medical History:   Diagnosis Date    Abdominal adhesions     Alcoholism (HCC) 1996    in remission    Anesthesia complication     difficult to wake up    Anxiety     Arthritis     Asthma     with exertion    Cancer (HCC)     melanoma    Colon polyp     Crohn's disease (HCC)     Depression     Depression     Disease of thyroid gland     Fibromyalgia     Fibromyalgia, primary     Fractures 2006    GERD (gastroesophageal reflux disease)     History of cholecystectomy 09/07/2019    Hyperlipidemia     Infected tooth 01/2024    Tooth #14-was treated with antibiotics-endodontal appt 2/1 may need root canal    Irregular heart beat     can be tachy; also has orthoststic hypotension    Irritable bowel syndrome 1990    Lazy eye     resolved: 3/27/17    Medical clearance for psychiatric admission 07/13/2021    Melanoma (HCC) 2010    Mild asthma 11/04/2020    Osteoarthritis 07/2018    Osteopenia     with joint pain-elevated DEV    Polymyalgia (HCC)     Psychiatric disorder     Shortness of breath     assoc with tachycardia    Stomach disorder 1995    Tinnitus     Wears glasses     for reading     Present on Admission:   Irritable bowel syndrome   Orthostatic hypotension   Acute intractable headache   Radiculopathy of lumbar region      Admitting Diagnosis: Ataxia [R27.0]  Headache [R51.9]  Age/Sex: 68 y.o. female  Admission Orders:  Cont pulse ox  Consult neuro  Pt eval & tx  Orthostatic BP  Scd     Scheduled Medications:  calcium carbonate-vitamin D, 1 tablet, Oral, Daily With Breakfast  cyclobenzaprine, 10 mg, Oral, Q8H  docusate sodium, 100 mg, Oral, BID  DULoxetine, 60 mg, Oral, Daily  enoxaparin, 40 mg, Subcutaneous, Daily  gabapentin, 300 mg, Oral,  BID  ketorolac, 30 mg, Intravenous, Q8H KIRBY  levothyroxine, 50 mcg, Oral, Early Morning  midodrine, 5 mg, Oral, Daily  pantoprazole, 40 mg, Oral, Early Morning  polyethylene glycol, 17 g, Oral, Daily  QUEtiapine, 100 mg, Oral, HS      Continuous IV Infusions:  sodium chloride, 125 mL/hr, Intravenous, Continuous      PRN Meds:  acetaminophen, 650 mg, Oral, Q6H PRN  albuterol, 2 puff, Inhalation, Q6H PRN  loperamide, 2 mg, Oral, 4x Daily PRN  ondansetron, 4 mg, Intravenous, Q6H PRN        IP CONSULT TO NEUROLOGY    Network Utilization Review Department  ATTENTION: Please call with any questions or concerns to [phone] and carefully listen to the prompts so that you are directed to the right person. All voicemails are confidential.   For Discharge needs, contact Care Management DC Support Team at [phone] opt. [phone]  Send all requests for admission clinical reviews, approved or denied determinations and any other requests to dedicated fax number below belonging to the [facility] where the patient is receiving treatment. List of dedicated fax numbers for the Facilities:  FACILITY NAME UR FAX NUMBER   ADMISSION DENIALS (Administrative/Medical Necessity) [phone]   DISCHARGE SUPPORT TEAM (NETWORK) [phone]   PARENT CHILD HEALTH (Maternity/NICU/Pediatrics) [phone]   [facility] [phone]   General acute hospital [phone]   [facility] [phone]   [facility] [phone]   [facility] [phone]   [facility] [phone]   [facility] [phone]   [facility] [phone]   [facility] [phone]   [facility] [phone]   [facility] [phone]   [facility] [phone]

## 2024-06-18 NOTE — ASSESSMENT & PLAN NOTE
Patient notes that her  recently started cancer treatment and her job can be stressful at times. Patient feels like she has been dealing with these stressors well.  Continue home regimen Cymbalta and Seroquel   QTc 474

## 2024-06-18 NOTE — CONSULTS
[facility]   Neurology Initial Consult    Ina Tolbert is a 68 y.o. female  [address] [place] [id number]          Information obtained from:   Chief Complaint   Patient presents with    Headache     Pt reports sharp constant right sided headache, rates pain a 5.  Denies nausea/vomiting, dizziness, and lightheadedness.          Assessment/Plan:    1.  Scalp Pain  2.  History of focal migraine  3.  Neuropathy  4.  Fibromyalgia  5.  HTN  6.  HLD    -Neurological assessments  -Can continue Neurontin 300 mg twice a day, consider increase to 3 times daily for persistent temporal pain  -Migraine cocktail every 8 hours scheduled with Toradol, Reglan and Benadryl  -ice pack to the scalp  -D/W Medical team poss eval for bite or other painful rask  -Pt denies pain in temporal region and/or in V1-3 distribution of the face. Is not convincing for TN or TA given no pain to touch or sensitivity.     Pt is a 69yo female with multiple comorbidities including FM, migraine and neuropathies.    Pt noted that she was in the car at 2p yesterday and had sudden onset of pain in the scalp region.  Originally reported as temporal pain, pt describes it as a throbbing pain in the posterior parietal region of the scalp that was very painful to the touch. She noted that after this started she felt weakness in the LE bilaterally and has some gait ataxia.    She went to urgent care, they were concerned about her ataxic gait and headache being more of a neurologic etiology.   Pt presented to the ER with continued reports of headache noted in the temporal and received magnesium and migraine cocktail without effect.  Pt was then admitted and Neurology was consulted for poss neurological etiology and follow up as pt does have prior history with neuro department and treatment of migraine activity.  Spoke with pt today, she noted that this pain was not like her migraine activity and noted that the throbbing pain was not in the face or  temporal region. She noted that it was mostly in the posterior parietal region and she still has significant pain there. She noted no headache today but feels lumps on her scalp that are painful to touch.    On exam pt had no focal deficits.  We did ambulate in the room and pt was slightly guarded but felt more stable on her feet. Her pace was slow but steady without impulsivity.   Evaluated pt facial distributions and has no sensitivity or swelling. She has equal sensations bilaterally, less convincing for a TN or TA.  Pt does have some bumps palpated on the scalp area, several very small, one slightly larger that appears to have a brownish discoloration. No erythema or large amount of swelling. Pt has increased sensitivity to the touch of the indurations.    Question viral rash vs insect bite, less likely neurological.   Did message medical team who will take a look at it and see if there is anything further to do with it.   Pt was permitted ice pack to the area to help with swelling and pain.     Ina Tolbert will need follow up in 8-10 weeks  with general attending or advance practitioner. She will not require outpatient neurological testing.      HPI:  Ina Tolbert is a 67yo female with PMH of Crohn's disease, osteoarthritis, osteoporosis, Lyme's arthritis, cervical disc disease, HTN, HLD, Raynaud's, fibromyalgia, IBS, hypothyroidism, history of EtOH abuse/sober, asthma and anxiety with underlying psychiatric disease who was brought to the ER after presenting to urgent care with sudden onset headache and ataxia.  Patient reported the night prior having a headache for approximately 2 hours, resolved after taking Tylenol PM.  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  She stated that it  "was very sensitive to the touch, felt as if there was a lump on her head and was painful, rated the intensity 5/10 at urgent care.  Patient also noted getting out of the car she had lower extremity weakness, reported to urgent care right-sided numbness.  Patient was then transported by EMS from urgent care to the ER, upon arrival her blood pressure was 138/92 with a heart rate of 92.  Patient had continued reports of headache, with increased anxiety and was noted to have some abnormal coordination on her ambulation.  Patient had UDS done was positive for opiates, ESR 23, CRP slightly elevated at 11.3.  She had a CTA of the head and neck which was stable since 2020 noting a narrowing of the right vertebral artery in the V1 to the proximal V2 segments.  Patient does have significant rheumatology history, most recently had elevated ANCA and DEV studies however ANCA is now normal suggestive of falsely positive DEV possibly related to her thyroid disease.  Workup continues to be in progress with new rheumatology providers.  Patient received migraine cocktail with Decadron, Reglan and Benadryl in the ER, reported to medical team having continued discomfort.  They also gave her a dose of IV morphine without relief.  She was started on IV Toradol, magnesium 2 g and p.o. Flexeril with IV hydration upon admission.    Spoke with the pt today, she noted that she has been feeling pretty good lately and has not had any issues with her dizziness or headaches.  She noted the night before she had a milder headache and took her Tylenol and did fine. She got up to go the Dr for rheumatology consultation and noted that she was feeling \"great\".  She noted that in the afternoon she had suddenly developed a throbbing pain to the scalp posteriorly above her Rt ear.  She noted that she could feel some bumps there and it was painful to touch.  Pt noted that she does not have a headache today, but still feels the bumps and has at least one " that is larger than the others and more painful.    Pt noted that her gait today is better, she walked with the walker this morning but was able to be up in the room today without and felt fairly steady on her feet. She noted for some reason yesterday she was just very weak and very off balance.  She has not focal deficits and her gait was slowed but fairly steady gait pattern.  Examined pt's scalp and skin/face.  She has no sensitivity in the temporal region and she denies any pain to this region.  She has equal sensations bilaterally.  Evaluated the scalp and was able to palpate some small bumps along the posterior parietal region with one lump that seems to be slightly larger than the others.  Pt has been rubbing it and felt that it was increasingly sensitive and painful to touch, more so than the others.  Question poss viral process vs poss bite of sorts, ? Spider.   Will reach out to medicine and allow pt to place some ice to the site.            Past Medical History:   Diagnosis Date    Abdominal adhesions     Alcoholism ([facility]) 1996    in remission    Anesthesia complication     difficult to wake up    Anxiety     Arthritis     Asthma     with exertion    Cancer ([facility])     melanoma    Colon polyp     Crohn's disease ([facility])     Depression     Depression     Disease of thyroid gland     Fibromyalgia     Fibromyalgia, primary     Fractures 2006    GERD (gastroesophageal reflux disease)     History of cholecystectomy 09/07/2019    Hyperlipidemia     Infected tooth 01/2024    Tooth #14-was treated with antibiotics-endodontal appt 2/1 may need root canal    Irregular heart beat     can be tachy; also has orthoststic hypotension    Irritable bowel syndrome 1990    Lazy eye     resolved: 3/27/17    Medical clearance for psychiatric admission 07/13/2021    Melanoma ([facility]) 2010    Mild asthma 11/04/2020    Osteoarthritis 07/2018    Osteopenia     with joint pain-elevated DEV    Polymyalgia ([facility])     Psychiatric disorder      Shortness of breath     assoc with tachycardia    Stomach disorder 1995    Tinnitus     Wears glasses     for reading       Past Surgical History:   Procedure Laterality Date    ABDOMINAL SURGERY      lysis of adhesions x 2    APPENDECTOMY      CERVICAL FUSION      May 5,2021 and Jan. 01,2020    CHOLECYSTECTOMY      open    COLONOSCOPY      DILATION AND CURETTAGE OF UTERUS      FOOT SURGERY  05/2017    NECK SURGERY  01/2019 5/2021    NEUROMA EXCISION Right 05/26/2017    Procedure: EXCISION MASS / FIBROMA FOOT;  Surgeon: Jorden Crespo DPM;  Location: [facility] MAIN OR;  Service:     PARS PLANA VITRECTOMY W/ REPAIR OF MACULAR HOLE  12/23/2020    NC ARTHRP KNE CONDYLE&PLATU MEDIAL&LAT COMPARTMENTS Right 2/6/2024    Procedure: ARTHROPLASTY KNEE TOTAL W ROBOT - RIGHT - PRESS FIT - OVERNIGHT;  Surgeon: Jairon Kim DO;  Location: [facility];  Service: Orthopedics    NC XCAPSL CTRC RMVL INSJ IO LENS PROSTH W/O ECP Left 12/06/2021    Procedure: EXTRACTION EXTRACAPSULAR CATARACT PHACO INTRAOCULAR LENS (IOL);  Surgeon: Mandeep Chavez MD;  [facility] MAIN OR;  Service: Ophthalmology    RIGHT OOPHORECTOMY      UPPER GASTROINTESTINAL ENDOSCOPY  5/2019    WISDOM TOOTH EXTRACTION      x4       Allergies   Allergen Reactions    Meperidine Lightheadedness and Other (See Comments)    Sulfa Antibiotics Hives         Current Facility-Administered Medications:     acetaminophen (TYLENOL) tablet 650 mg, 650 mg, Oral, Q6H PRN, Radha A SHRAVAN Enamorado    albuterol (PROVENTIL HFA,VENTOLIN HFA) inhaler 2 puff, 2 puff, Inhalation, Q6H PRN, Radha A SHRAVAN Enamorado    albuterol inhalation solution 2.5 mg, 2.5 mg, Nebulization, Once, Faith Campbell DO    calcium carbonate-vitamin D 500 mg-5 mcg tablet 1 tablet, 1 tablet, Oral, Daily With Breakfast, Radha A SHRAVAN Enamorado, 1 tablet at 06/18/24 0918    cyclobenzaprine (FLEXERIL) tablet 10 mg, 10 mg, Oral, Q8H, Radha A OLGA EnamoradoNP, 10 mg at 06/18/24 0606    docusate sodium (COLACE)  capsule 100 mg, 100 mg, Oral, BID, Radha A Ujvary, CRNP, 100 mg at 06/18/24 0918    DULoxetine (CYMBALTA) delayed release capsule 60 mg, 60 mg, Oral, Daily, Radha A Ujvary, CRNP, 60 mg at 06/18/24 0918    enoxaparin (LOVENOX) subcutaneous injection 40 mg, 40 mg, Subcutaneous, Daily, Radha A Ujvary, CRNP, 40 mg at 06/18/24 0919    gabapentin (NEURONTIN) capsule 300 mg, 300 mg, Oral, BID, Radha A Ujvary, CRNP, 300 mg at 06/18/24 0918    ketorolac (TORADOL) injection 30 mg, 30 mg, Intravenous, Q8H KIRBY, Radha A Ujvary, CRNP, 30 mg at 06/18/24 0555    levothyroxine tablet 50 mcg, 50 mcg, Oral, Early Morning, Radha A Ujvary, CRNP, 50 mcg at 06/18/24 0606    loperamide (IMODIUM) capsule 2 mg, 2 mg, Oral, 4x Daily PRN, Radha A Ujvary, CRNP    midodrine (PROAMATINE) tablet 5 mg, 5 mg, Oral, Daily, Radha A Ujvary, CRNP, 5 mg at 06/18/24 0917    ondansetron (ZOFRAN) injection 4 mg, 4 mg, Intravenous, Q6H PRN, Radha A Ujvary, CRNP    pantoprazole (PROTONIX) EC tablet 40 mg, 40 mg, Oral, Early Morning, Radha A Ujvary, CRNP, 40 mg at 06/18/24 0606    polyethylene glycol (MIRALAX) packet 17 g, 17 g, Oral, Daily, Radha A Ujvary, CRNP, 17 g at 06/18/24 0918    QUEtiapine (SEROquel) tablet 100 mg, 100 mg, Oral, HS, Radha A Ujvary, CRNP, 100 mg at 06/17/24 2323    sodium chloride 0.9 % infusion, 50 mL/hr, Intravenous, Continuous, Faith Campbell DO, Last Rate: 125 mL/hr at 06/17/24 2318, 125 mL/hr at 06/17/24 2318    Social History     Socioeconomic History    Marital status: /Civil Union     Spouse name: Not on file    Number of children: Not on file    Years of education: Not on file    Highest education level: Not on file   Occupational History    Not on file   Tobacco Use    Smoking status: Never     Passive exposure: Never    Smokeless tobacco: Never   Vaping Use    Vaping status: Never Used   Substance and Sexual Activity    Alcohol use: Not Currently    Drug use: No     Sexual activity: Not Currently     Partners: Male   Other Topics Concern    Not on file   Social History Narrative    Not on file     Social Determinants of Health     Financial Resource Strain: Not on file   Food Insecurity: No Food Insecurity (11/14/2023)    Hunger Vital Sign     Worried About Running Out of Food in the Last Year: Never true     Ran Out of Food in the Last Year: Never true   Transportation Needs: No Transportation Needs (11/14/2023)    PRAPARE - Transportation     Lack of Transportation (Medical): No     Lack of Transportation (Non-Medical): No   Physical Activity: Not on file   Stress: Not on file   Social Connections: Not on file   Intimate Partner Violence: Not on file   Housing Stability: Low Risk  (11/14/2023)    Housing Stability Vital Sign     Unable to Pay for Housing in the Last Year: No     Number of Times Moved in the Last Year: 1     Homeless in the Last Year: No       Family History   Problem Relation Age of Onset    Heart disease Mother     Stroke Mother 62    COPD Mother     Arthritis Mother     Asthma Mother     Hypertension Father     Dislocations Sister     Multiple sclerosis Sister     Neurological problems Sister     Scoliosis Sister     Depression Sister     Mental illness Sister     No Known Problems Maternal Grandmother     No Known Problems Maternal Grandfather     No Known Problems Paternal Grandmother     No Known Problems Paternal Grandfather     Kidney disease Brother         kidney transplant    No Known Problems Maternal Aunt     No Known Problems Maternal Uncle     No Known Problems Paternal Aunt     No Known Problems Paternal Uncle     ADD / ADHD Neg Hx     Anesthesia problems Neg Hx     Cancer Neg Hx     Clotting disorder Neg Hx     Collagen disease Neg Hx     Diabetes Neg Hx     Dislocations Neg Hx     Learning disabilities Neg Hx     Neurological problems Neg Hx     Osteoporosis Neg Hx     Rheumatologic disease Neg Hx     Scoliosis Neg Hx     Vascular Disease Neg  "Hx          Review of systems:  Please see HPI for positive symptoms.   Constitutional: No fever, no chills, no weight change.   Ocular: No diplopia, no blurred vision, spots/zigzag lines  HEENT:  No sore throat, headache or congestion.   COR:  No chest pain. No palpitations.   Lungs:  no sob  GI:  no  nausea, no vomiting, no diarrhea, no constipation, no anorexia.   :  No dysuria, frequency, or urgency. No hematuria.    Musculoskeletal:  No joint pain or swelling   + mild tremor LUE > RUE  Skin:  No rash or itching.   + bumps on the scalp with pain to touch  Psychiatric:  no anxiety, no depression.   Endocrine:  No polyuria or polydipsia.    Physical examination:  /71 (BP Location: Left arm)   Pulse 99   Temp 97.5 °F (36.4 °C) (Oral)   Resp 16   Ht 5' 5\" (1.651 m)   Wt 73.6 kg (162 lb 3.2 oz)   LMP  (LMP Unknown)   SpO2 97%   BMI 26.99 kg/m²     GENERAL APPEARANCE:  The patient is alert, oriented.    HEENT:  Head is normocephalic.  Pupils are equal and reactive.    NECK:  Supple without lymphadenopathy.   HEART:  Regular rate and rhythm.  LUNGS:  No audible wheezing or stridor heard  ABDOMEN:  Soft, nontender, nondistended   EXTREMITIES:  Without cyanosis, clubbing or edema.   SKIN: pt has several small bumps along her Rt post parietal scalp region one is larger than the others and seems to be more painful to the touch.  ?shingles    Mental status:  The patient is alert, attentive, and oriented.   Speech is clear and fluent, good repetition, comprehension, and naming.   Cranial nerves:  CN II: Visual fields are full to confrontation.   Fundoscopic exam is normal with sharp discs and no vascular changes.  Pupils are 3 mm and briskly reactive to light.   CN III, IV, VI: At primary gaze, there is no eye deviation.   CN V: Facial sensation is intact in all 3 divisions bilaterally.   Corneal responses are intact.  CN VII: Face is symmetric with normal eye closure and smile.  CN VIII: Hearing is normal to " rubbing fingers  CN IX, X: Palate elevates symmetrically. Phonation is normal.  CN XI: Head turning and shoulder shrug are intact  CN XII: Tongue is midline with normal movements and no atrophy.  Motor:  There is no pronator drift of out-stretched arms.    Muscle bulk and tone are normal.   Mild tremor with action Lt>Rt  Muscle exam  Arm Right Left Leg Right Left   Deltoid 5/5 5/5 Iliopsoas 5/5 5/5   Biceps 5/5 5/5 Quads 5/5 5/5   Triceps 5/5 5/5 Hamstrings 5/5 5/5   Wrist Extension 5/5 5/5 Ankle Dorsi Flexion 5/5 5/5   Wrist Flexion 5/5 5/5 Ankle Plantar Flexion 5/5 5/5        Reflexes    RJ BJ TJ KJ AJ Plantars Cantrell's   Right 2+ 2+  2+ 2+ Downgoing Not present   Left 2+ 2+  2+ 2+ Downgoing Not present      Sensory:  Light touch, Temperature, position sense, and vibration sense are intact in fingers and toes.  Coordination:  Rapid alternating movements and fine finger movements are intact.   There is no dysmetria on finger-to-nose and heel-knee-shin.   There are no abnormal or extraneous movements.   Romberg negative.  Gait/Stance:  Normal heel/toe walking and tandem gait. Pt with some uncertainty with ambulation so is guarded on exam, however, subjectively reports being more stable on her feet.     Lab Results   Component Value Date    WBC 4.44 06/18/2024    HGB 12.8 06/18/2024    HCT 40.2 06/18/2024    MCV 83 06/18/2024     06/18/2024     Lab Results   Component Value Date    HGBA1C 6.6 (H) 01/18/2024     Lab Results   Component Value Date    ALT 20 06/17/2024    AST 21 06/17/2024    ALKPHOS 66 06/17/2024    BILITOT 0.2 08/04/2016     Lab Results   Component Value Date    GLUCOSE 95 08/04/2016    CALCIUM 8.9 06/18/2024     08/04/2016    K 4.6 06/18/2024    CO2 24 06/18/2024     06/18/2024    BUN 15 06/18/2024    CREATININE 0.75 06/18/2024         Review of reports and notes reveal:  Independent Interpretation of images or specimens:  CTA head and neck with and without contrast    Result  Date: 6/17/2024  CT Brain:  No acute intracranial CT abnormality. CT Angiography: 1.  Significantly narrowed caliber right vertebral artery from its origin throughout the neck and pre-PICA intracranial segment with more robust flow in the post PICA intracranial segment, grossly stable from 11/24/2022, new from 10/20/2020. 2.  No stenosis in the left vertebral and bilateral carotid arteries. 3.  Patent major vessels of the [place] without significant stenosis. Workstation performed: [id number]           Thank you for this consult.    Total time of encounter: 70 Minutes  More than 50% of time was spent in counseling and coordination of care of patient. D/W pt at bedside, medical team and attending Neurology MD.

## 2024-06-18 NOTE — ASSESSMENT & PLAN NOTE
POA a sudden onset of severe headache on right side,  above right ear.  She states the headache started suddenly at around 2 PM today while sitting in the car getting ready for the next patient she was going to see.  States it hurts to touch.  Denies double vision,nausea, and vomiting, & dizziness.  She also noted b/l lower leg weakness and unsteadiness. Denies numbness and tingling.  Denies URI symptoms.  Does states she had a headache last night and it resolved after taking Tylenol PM.   CTA of head and neck: Significantly narrowed caliber right vertebral artery from its origin throughout the neck and pre-PICA intracranial segment with more robust flow in the post PICA intracranial segment, grossly stable from 11/24/2022, new from 10/20/2020. No stenosis in the left vertebral and bilateral carotid arteries. Patent major vessels of the [place] without significant stenosis  History of focal migraines and follows with neurology as an outpatient  No focal neurodeficits noted,  ataxia and b/l weakness noted in an lower extremities  Suspect complex migraine  CRP 11.3 sed rate 23  In ED, migraine cocktail: decadron 10 mg IV, benadryl 25 mg IV, and reglan 10 mg IV - also morphine 4 mg Iv x 1 without relief  Neurology consulted appreciate recommendations  Monitor neurostatus q4 hr  Will give a dose of magnesium 2 g IV, start IV Toradol 30 mg  and start  po Flexeril 10 mg q8 hr  Continue IV hydration, normal saline at 125 ml/hr

## 2024-06-19 ENCOUNTER — TELEMEDICINE (OUTPATIENT)
Dept: PSYCHIATRY | Facility: CLINIC | Age: 69
End: 2024-06-19
Payer: COMMERCIAL

## 2024-06-19 DIAGNOSIS — F51.05 INSOMNIA RELATED TO ANOTHER MENTAL DISORDER: ICD-10-CM

## 2024-06-19 DIAGNOSIS — F63.0 GAMBLING DISORDER, MODERATE: ICD-10-CM

## 2024-06-19 DIAGNOSIS — F43.10 PTSD (POST-TRAUMATIC STRESS DISORDER): Chronic | ICD-10-CM

## 2024-06-19 DIAGNOSIS — F41.1 GENERALIZED ANXIETY DISORDER: ICD-10-CM

## 2024-06-19 DIAGNOSIS — F33.2 SEVERE EPISODE OF RECURRENT MAJOR DEPRESSIVE DISORDER, WITHOUT PSYCHOTIC FEATURES (HCC): Primary | ICD-10-CM

## 2024-06-19 LAB
ATRIAL RATE: 86 BPM
ATRIAL RATE: 90 BPM
ATRIAL RATE: 92 BPM
MRSA NOSE QL CULT: NORMAL
P AXIS: 57 DEGREES
P AXIS: 59 DEGREES
P AXIS: 78 DEGREES
PR INTERVAL: 180 MS
PR INTERVAL: 182 MS
PR INTERVAL: 186 MS
QRS AXIS: -27 DEGREES
QRS AXIS: -45 DEGREES
QRS AXIS: -9 DEGREES
QRSD INTERVAL: 78 MS
QRSD INTERVAL: 80 MS
QRSD INTERVAL: 84 MS
QT INTERVAL: 380 MS
QT INTERVAL: 388 MS
QT INTERVAL: 388 MS
QTC INTERVAL: 464 MS
QTC INTERVAL: 469 MS
QTC INTERVAL: 474 MS
T WAVE AXIS: 19 DEGREES
T WAVE AXIS: 21 DEGREES
T WAVE AXIS: 30 DEGREES
VENTRICULAR RATE: 86 BPM
VENTRICULAR RATE: 90 BPM
VENTRICULAR RATE: 92 BPM

## 2024-06-19 PROCEDURE — 99214 OFFICE O/P EST MOD 30 MIN: CPT | Performed by: NURSE PRACTITIONER

## 2024-06-19 PROCEDURE — 93010 ELECTROCARDIOGRAM REPORT: CPT | Performed by: INTERNAL MEDICINE

## 2024-06-19 PROCEDURE — 90833 PSYTX W PT W E/M 30 MIN: CPT | Performed by: NURSE PRACTITIONER

## 2024-06-19 NOTE — PSYCH
Virtual Regular Visit    Problem List Items Addressed This Visit          Behavioral Health    PTSD (post-traumatic stress disorder) (Chronic)    Gambling disorder, moderate    Generalized anxiety disorder    Insomnia related to another mental disorder    Severe episode of recurrent major depressive disorder, without psychotic features (HCC) - Primary     Reason for visit is   Chief Complaint   Patient presents with    Anxiety    Depression    Insomnia     Gambling addiction    Gambling addiction    Medication Management     Encounter provider Callie Guidry, PhD    Provider located at [address]  [address]  [facility] [id number] [phone]    Recent Visits  No visits were found meeting these conditions.  Showing recent visits within past 7 days and meeting all other requirements  Today's Visits  Date Type Provider Dept   06/19/24 Telemedicine Callie Guidry PhD Counts include 234 beds at the [facility]   Showing today's visits and meeting all other requirements  Future Appointments  No visits were found meeting these conditions.  Showing future appointments within next 150 days and meeting all other requirements       After connecting through [employer], the patient was identified by name and date of birth. Ina Tolbert was informed that this is a telemedicine visit and that the visit is being conducted through the Epic Embedded platform. She agrees to proceed. which may not be secure and therefore, might not be HIPAA-compliant.  My office door was closed. No one else was in the room.  She acknowledged consent and understanding of privacy and security of the video platform. The patient has agreed to participate and understands they can discontinue the visit at any time.    SUBJECTIVE:    Ina Tolbert is a 68 y.o. female with a history of history of anxiety, depression, insomnia, and gambling addiction seen for medication management and  "mood assessment.  Ina reports she is enjoying her job, \"medications seem to be doing what they should be\" reports she is trying to diet and eat healthy.  Occasionally will hidalgo; however, she will be watching them out of money she is spending, at times herself and it is not Compulsive.  She reports being in the emergency room for head pain and several bumps on her head.  Medications remain the same and she is to follow-up with PCP due to negative findings in the ER.  She reports she is hydrating and she sleeps with the use of the medication.  Takes her medication as prescribed family are supportive.    HPI ROS Appetite Changes and Sleep: normal appetite and normal energy level    Review Of Systems:     Mood Normal   Behavior Normal    Thought Content Normal   General Sleep Disturbances   Personality Normal   Other Psych Symptoms Normal   Constitutional As Noted in HPI   ENT As Noted in HPI   Cardiovascular As Noted in HPI   Respiratory As Noted in HPI   Gastrointestinal As Noted in HPI   Genitourinary As Noted in HPI   Musculoskeletal As Noted in HPI   Integumentary As Noted in HPI   Neurological As Noted in HPI   Endocrine Hypothyroid   Other Symptoms Normal        Substance Abuse History:    Social History     Substance and Sexual Activity   Drug Use No       Family Psychiatric History:     Family History   Problem Relation Age of Onset    Heart disease Mother     Stroke Mother 62    COPD Mother     Arthritis Mother     Asthma Mother     Hypertension Father     Dislocations Sister     Multiple sclerosis Sister     Neurological problems Sister     Scoliosis Sister     Depression Sister     Mental illness Sister     No Known Problems Maternal Grandmother     No Known Problems Maternal Grandfather     No Known Problems Paternal Grandmother     No Known Problems Paternal Grandfather     Kidney disease Brother         kidney transplant    No Known Problems Maternal Aunt     No Known Problems Maternal Uncle     No " Known Problems Paternal Aunt     No Known Problems Paternal Uncle     ADD / ADHD Neg Hx     Anesthesia problems Neg Hx     Cancer Neg Hx     Clotting disorder Neg Hx     Collagen disease Neg Hx     Diabetes Neg Hx     Dislocations Neg Hx     Learning disabilities Neg Hx     Neurological problems Neg Hx     Osteoporosis Neg Hx     Rheumatologic disease Neg Hx     Scoliosis Neg Hx     Vascular Disease Neg Hx        Social History     Socioeconomic History    Marital status: /Civil Union     Spouse name: Not on file    Number of children: Not on file    Years of education: Not on file    Highest education level: Not on file   Occupational History    Not on file   Tobacco Use    Smoking status: Never     Passive exposure: Never    Smokeless tobacco: Never   Vaping Use    Vaping status: Never Used   Substance and Sexual Activity    Alcohol use: Not Currently    Drug use: No    Sexual activity: Not Currently     Partners: Male   Other Topics Concern    Not on file   Social History Narrative    Not on file     Social Determinants of Health     Financial Resource Strain: Not on file   Food Insecurity: No Food Insecurity (6/18/2024)    Hunger Vital Sign     Worried About Running Out of Food in the Last Year: Never true     Ran Out of Food in the Last Year: Never true   Transportation Needs: No Transportation Needs (6/18/2024)    PRAPARE - Transportation     Lack of Transportation (Medical): No     Lack of Transportation (Non-Medical): No   Physical Activity: Not on file   Stress: Not on file   Social Connections: Not on file   Intimate Partner Violence: Not on file   Housing Stability: Low Risk  (6/18/2024)    Housing Stability Vital Sign     Unable to Pay for Housing in the Last Year: No     Number of Times Moved in the Last Year: 0     Homeless in the Last Year: No       Past Medical History:   Diagnosis Date    Abdominal adhesions     Alcoholism (HCC) 1996    in remission    Anesthesia complication     difficult to  wake up    Anxiety     Arthritis     Asthma     with exertion    Cancer (HCC)     melanoma    Colon polyp     Crohn's disease (HCC)     Depression     Depression     Disease of thyroid gland     Fibromyalgia     Fibromyalgia, primary     Fractures 2006    GERD (gastroesophageal reflux disease)     History of cholecystectomy 09/07/2019    Hyperlipidemia     Infected tooth 01/2024    Tooth #14-was treated with antibiotics-endodontal appt 2/1 may need root canal    Irregular heart beat     can be tachy; also has orthoststic hypotension    Irritable bowel syndrome 1990    Lazy eye     resolved: 3/27/17    Medical clearance for psychiatric admission 07/13/2021    Melanoma (HCC) 2010    Mild asthma 11/04/2020    Osteoarthritis 07/2018    Osteopenia     with joint pain-elevated DEV    Polymyalgia (HCC)     Psychiatric disorder     Shortness of breath     assoc with tachycardia    Stomach disorder 1995    Tinnitus     Wears glasses     for reading       Past Surgical History:   Procedure Laterality Date    ABDOMINAL SURGERY      lysis of adhesions x 2    APPENDECTOMY      CERVICAL FUSION      May 5,2021 and Jan. 01,2020    CHOLECYSTECTOMY      open    COLONOSCOPY      DILATION AND CURETTAGE OF UTERUS      FOOT SURGERY  05/2017    NECK SURGERY  01/2019 5/2021    NEUROMA EXCISION Right 05/26/2017    Procedure: EXCISION MASS / FIBROMA FOOT;  Surgeon: Jorden Crespo DPM;  Location: [facility];  Service:     PARS PLANA VITRECTOMY W/ REPAIR OF MACULAR HOLE  12/23/2020    [place] ARTHRP KNE CONDYLE&PLATU MEDIAL&LAT COMPARTMENTS Right 2/6/2024    Procedure: ARTHROPLASTY KNEE TOTAL W ROBOT - RIGHT - PRESS FIT - OVERNIGHT;  Surgeon: Jairon Kim DO;  Location: [facility];  Service: Orthopedics    TN XCAPSL CTRC RMVL INSJ IO LENS PROSTH W/O ECP Left 12/06/2021    Procedure: EXTRACTION EXTRACAPSULAR CATARACT PHACO INTRAOCULAR LENS (IOL);  Surgeon: Mandeep Chavez MD;  Location: [facility] MAIN OR;  Service: Ophthalmology    RIGHT OOPHORECTOMY       UPPER GASTROINTESTINAL ENDOSCOPY  5/2019    WISDOM TOOTH EXTRACTION      x4       Current Outpatient Medications   Medication Sig Dispense Refill    albuterol (ProAir HFA) 90 mcg/act inhaler Inhale 2 puffs every 6 (six) hours as needed for wheezing 8.5 g 0    amoxicillin (AMOXIL) 500 MG tablet Take 4 tablets (2,000 mg total) by mouth 60 minutes pre-procedure 8 tablet 2    Calcium Carb-Cholecalciferol 600-12.5 MG-MCG CAPS Take by mouth daily in the early morning      docusate sodium (COLACE) 100 mg capsule Take 1 capsule (100 mg total) by mouth 2 (two) times a day 60 capsule 0    DULoxetine (CYMBALTA) 60 mg delayed release capsule Take 1 capsule (60 mg total) by mouth daily 90 capsule 1    esomeprazole (NexIUM) 40 MG capsule TAKE 1 CAPSULE BY MOUTH EVERY DAY BEFORE BREAKFAST 90 capsule 1    famotidine (PEPCID) 20 mg tablet Take 1 tablet (20 mg total) by mouth daily as needed for heartburn 90 tablet 3    gabapentin (NEURONTIN) 300 mg capsule TAKE 1 CAPSULE (300 MG TOTAL) BY MOUTH 2 TIMES A DAY. 60 capsule 2    levothyroxine 50 mcg tablet TAKE 1 TABLET BY MOUTH EVERY DAY 90 tablet 1    midodrine (PROAMATINE) 5 mg tablet Take 5 mg by mouth daily      QUEtiapine (SEROquel) 100 mg tablet Take 1 tablet (100 mg total) by mouth daily at bedtime 90 tablet 1    Sodium Fluoride 5000 PPM 1.1 % PSTE USE ONCE DAILY PREFERABLY AT NIGHT       No current facility-administered medications for this visit.        Allergies   Allergen Reactions    Meperidine Lightheadedness and Other (See Comments)    Sulfa Antibiotics Hives       The following portions of the patient's history were reviewed and updated as appropriate: allergies, current medications, past family history, past medical history, past social history, past surgical history, and problem list.    OBJECTIVE:     Mental Status Examination:    Appearance calm and cooperative , adequate hygiene and grooming, and good eye contact    Mood euthymic   Affect affect appropriate   "  Speech a normal rate   Thought Processes normal thought processes   Hallucinations no hallucinations present    Thought Content no delusions   Abnormal Thoughts no suicidal thoughts  and no homicidal thoughts    Orientation  oriented to person and place and time   Remote Memory short term memory intact and long term memory intact   Attention Span concentration intact   Intellect Appears to be of Average Intelligence   Insight Insight intact   Judgement judgment was intact   Muscle Strength Muscle strength and tone were normal   Language no difficulty naming common objects   Fund of Knowledge displays adequate knowledge of current events   Pain none   Pain Scale 0       Laboratory Results: No results found. However, due to the size of the patient record, not all encounters were searched. Please check Results Review for a complete set of results.    Assessment/Plan:       Diagnoses and all orders for this visit:    Severe episode of recurrent major depressive disorder, without psychotic features (HCC)    PTSD (post-traumatic stress disorder)    Insomnia related to another mental disorder    Generalized anxiety disorder    Gambling disorder, moderate          Treatment Recommendations- Risks Benefits      Immediate Medical/Psychiatric/Psychotherapy Treatments and Any Precautions: Continue treatment plan    Risks, Benefits And Possible Side Effects Of Medications:  {PSYCH RISK, BENEFITS AND POSSIBLE SIDE EFFECTS (Optional):41998       Psychotherapy Provided: 30 minutes  Supportive therapy  Medication evaluation  Mood assessment  Discussed safeguards when gambling  Normalized and validated her feelings  Aims negative  Goals discussed in session: Stable mood         Treatment Plan:    Completed and signed during the session: Not applicable - Treatment Plan not due at this session    \"Portions of the record may have been created with voice recognition software. Occasional wrong word or \"sound a like\" substitutions may " "have occurred due to the inherent limitations of voice recognition software. Read the chart carefully and recognize, using context, where substitutions have occurred. Please call if you have any questions. \"      Callie Guidry, PhD 06/19/24  "

## 2024-06-24 ENCOUNTER — TELEPHONE (OUTPATIENT)
Dept: FAMILY MEDICINE CLINIC | Facility: CLINIC | Age: 69
End: 2024-06-24

## 2024-06-24 NOTE — TELEPHONE ENCOUNTER
Patient scheduled a hospital follow up with Dr Kamara on Wednesday 06/26/24. Please call for TCM questions.

## 2024-06-24 NOTE — TELEPHONE ENCOUNTER
Patient was discharged 6/18. We would not review TCM questions for a hospital follow up. Closing task.  Tracy Aguilera, CMA

## 2024-06-26 ENCOUNTER — OFFICE VISIT (OUTPATIENT)
Dept: FAMILY MEDICINE CLINIC | Facility: CLINIC | Age: 69
End: 2024-06-26
Payer: COMMERCIAL

## 2024-06-26 VITALS
RESPIRATION RATE: 18 BRPM | BODY MASS INDEX: 27.66 KG/M2 | SYSTOLIC BLOOD PRESSURE: 110 MMHG | HEIGHT: 65 IN | OXYGEN SATURATION: 98 % | DIASTOLIC BLOOD PRESSURE: 70 MMHG | HEART RATE: 84 BPM | TEMPERATURE: 97.6 F | WEIGHT: 166 LBS

## 2024-06-26 DIAGNOSIS — R51.9 ACUTE INTRACTABLE HEADACHE, UNSPECIFIED HEADACHE TYPE: Primary | ICD-10-CM

## 2024-06-26 DIAGNOSIS — G95.9 CERVICAL MYELOPATHY WITH CERVICAL RADICULOPATHY  (HCC): ICD-10-CM

## 2024-06-26 DIAGNOSIS — M54.12 CERVICAL MYELOPATHY WITH CERVICAL RADICULOPATHY  (HCC): ICD-10-CM

## 2024-06-26 PROBLEM — A69.23 LYME ARTHRITIS (HCC): Status: RESOLVED | Noted: 2018-02-28 | Resolved: 2024-06-26

## 2024-06-26 PROCEDURE — 99213 OFFICE O/P EST LOW 20 MIN: CPT | Performed by: INTERNAL MEDICINE

## 2024-06-26 NOTE — PROGRESS NOTES
"Ambulatory Visit  Name: Ina Tolbert      : 1955      MRN: [id number]  Encounter Provider: Tiffany Kamara MD  Encounter Date: 2024   Encounter department: [facility]    Assessment & Plan   1. Acute intractable headache, unspecified headache type  Assessment & Plan:  This has resolved.  Continue follow up with neurology.  Orders:  -     Ambulatory Referral to Dermatology; Future  2. Cervical myelopathy with cervical radiculopathy  (HCC)  Assessment & Plan:  Managed by neurology.       History of Present Illness     She went to [facility] for right sided headache and bumps on her head.  \"They didn't like the way she walked\" so called the squad and had her sent to the Er.  She was admitted for intractable headache, CT negative.  Headache resolved but bumps are still there.  She is disturbed by this and would like to see the dermatologist.  They are tender.  There is no rash.       Review of Systems   Constitutional: Negative.    Respiratory: Negative.     Cardiovascular: Negative.    Skin:         As per HPI.   Neurological:  Negative for dizziness, light-headedness, numbness and headaches.     Past Medical History:   Diagnosis Date   • Abdominal adhesions    • Alcoholism (HCC)     in remission   • Anesthesia complication     difficult to wake up   • Anxiety    • Arthritis    • Asthma     with exertion   • Cancer (HCC)     melanoma   • Colon polyp    • Crohn's disease (HCC)    • Depression    • Depression    • Disease of thyroid gland    • Fibromyalgia    • Fibromyalgia, primary    • Fractures    • GERD (gastroesophageal reflux disease)    • History of cholecystectomy 2019   • Hyperlipidemia    • Infected tooth 2024    Tooth #14-was treated with antibiotics-endodontal appt  may need root canal   • Irregular heart beat     can be tachy; also has orthoststic hypotension   • Irritable bowel syndrome    • Lazy eye     resolved: 3/27/17   • Medical clearance for " psychiatric admission 07/13/2021   • Melanoma (HCC) 2010   • Mild asthma 11/04/2020   • Osteoarthritis 07/2018   • Osteopenia     with joint pain-elevated DEV   • Polymyalgia (HCC)    • Psychiatric disorder    • Shortness of breath     assoc with tachycardia   • Stomach disorder 1995   • Tinnitus    • Wears glasses     for reading     Past Surgical History:   Procedure Laterality Date   • ABDOMINAL SURGERY      lysis of adhesions x 2   • APPENDECTOMY     • CERVICAL FUSION      May 5,2021 and Jan. 01,2020   • CHOLECYSTECTOMY      open   • COLONOSCOPY     • DILATION AND CURETTAGE OF UTERUS     • FOOT SURGERY  05/2017   • NECK SURGERY  01/2019 5/2021   • NEUROMA EXCISION Right 05/26/2017    Procedure: EXCISION MASS / FIBROMA FOOT;  Surgeon: Jorden Crespo DPM;  Location: [facility];  Service:    • PARS PLANA VITRECTOMY W/ REPAIR OF MACULAR HOLE  12/23/2020   • [place] ARTHRP KNE CONDYLE&PLATU MEDIAL&LAT COMPARTMENTS Right 2/6/2024    Procedure: ARTHROPLASTY KNEE TOTAL W ROBOT - RIGHT - PRESS FIT - OVERNIGHT;  Surgeon: Jairon Kim DO;  Location: [facility];  Service: Orthopedics   • [place] XCAPSL CTRC RMVL INSJ IO LENS PROSTH W/O ECP Left 12/06/2021    Procedure: EXTRACTION EXTRACAPSULAR CATARACT PHACO INTRAOCULAR LENS (IOL);  Surgeon: Mandeep Chavez MD;  Location: [facility] MAIN OR;  Service: Ophthalmology   • RIGHT OOPHORECTOMY     • UPPER GASTROINTESTINAL ENDOSCOPY  5/2019   • WISDOM TOOTH EXTRACTION      x4     Family History   Problem Relation Age of Onset   • Heart disease Mother    • Stroke Mother 62   • COPD Mother    • Arthritis Mother    • Asthma Mother    • Hypertension Father    • Dislocations Sister    • Multiple sclerosis Sister    • Neurological problems Sister    • Scoliosis Sister    • Depression Sister    • Mental illness Sister    • No Known Problems Maternal Grandmother    • No Known Problems Maternal Grandfather    • No Known Problems Paternal Grandmother    • No Known Problems Paternal Grandfather    •  Kidney disease Brother         kidney transplant   • No Known Problems Maternal Aunt    • No Known Problems Maternal Uncle    • No Known Problems Paternal Aunt    • No Known Problems Paternal Uncle    • ADD / ADHD Neg Hx    • Anesthesia problems Neg Hx    • Cancer Neg Hx    • Clotting disorder Neg Hx    • Collagen disease Neg Hx    • Diabetes Neg Hx    • Dislocations Neg Hx    • Learning disabilities Neg Hx    • Neurological problems Neg Hx    • Osteoporosis Neg Hx    • Rheumatologic disease Neg Hx    • Scoliosis Neg Hx    • Vascular Disease Neg Hx      Social History     Tobacco Use   • Smoking status: Never     Passive exposure: Never   • Smokeless tobacco: Never   Vaping Use   • Vaping status: Never Used   Substance and Sexual Activity   • Alcohol use: Not Currently   • Drug use: No   • Sexual activity: Not Currently     Partners: Male     Current Outpatient Medications on File Prior to Visit   Medication Sig   • albuterol (ProAir HFA) 90 mcg/act inhaler Inhale 2 puffs every 6 (six) hours as needed for wheezing   • amoxicillin (AMOXIL) 500 MG tablet Take 4 tablets (2,000 mg total) by mouth 60 minutes pre-procedure   • Calcium Carb-Cholecalciferol 600-12.5 MG-MCG CAPS Take by mouth daily in the early morning   • docusate sodium (COLACE) 100 mg capsule Take 1 capsule (100 mg total) by mouth 2 (two) times a day   • DULoxetine (CYMBALTA) 60 mg delayed release capsule Take 1 capsule (60 mg total) by mouth daily   • esomeprazole (NexIUM) 40 MG capsule TAKE 1 CAPSULE BY MOUTH EVERY DAY BEFORE BREAKFAST   • famotidine (PEPCID) 20 mg tablet Take 1 tablet (20 mg total) by mouth daily as needed for heartburn   • gabapentin (NEURONTIN) 300 mg capsule TAKE 1 CAPSULE (300 MG TOTAL) BY MOUTH 2 TIMES A DAY.   • levothyroxine 50 mcg tablet TAKE 1 TABLET BY MOUTH EVERY DAY   • midodrine (PROAMATINE) 5 mg tablet Take 5 mg by mouth daily   • QUEtiapine (SEROquel) 100 mg tablet Take 1 tablet (100 mg total) by mouth daily at bedtime  "  • Sodium Fluoride 5000 PPM 1.1 % PSTE USE ONCE DAILY PREFERABLY AT NIGHT     Allergies   Allergen Reactions   • Meperidine Lightheadedness and Other (See Comments)   • Sulfa Antibiotics Hives     Immunization History   Administered Date(s) Administered   • COVID-19 J&J (Sen) vaccine 0.5 mL 04/28/2021   • COVID-19 MODERNA VACC 0.25 ML IM BOOSTER 01/25/2022   • COVID-19 MODERNA VACC 0.5 ML IM 01/25/2022   • INFLUENZA 09/21/2012, 03/04/2014, 12/12/2014, 03/05/2018, 09/27/2018, 11/18/2019, 11/19/2020   • Influenza Quadrivalent Preservative Free 3 years and older IM 03/05/2018   • Influenza, high dose seasonal 0.7 mL 11/10/2021, 11/24/2022   • Influenza, recombinant, quadrivalent,injectable, preservative free 10/07/2020   • Pneumococcal Conjugate 13-Valent 09/05/2019, 09/05/2019, 11/10/2021   • Tdap 09/21/2012, 11/25/2020   • Tuberculin Skin Test-PPD Intradermal 08/03/2021   • Zoster Vaccine Recombinant 10/07/2020     Objective     /70   Pulse 84   Temp 97.6 °F (36.4 °C)   Resp 18   Ht 5' 5\" (1.651 m)   Wt 75.3 kg (166 lb)   LMP  (LMP Unknown)   SpO2 98%   BMI 27.62 kg/m²     Physical Exam  Constitutional:       Appearance: Normal appearance.   HENT:      Head: Normocephalic and atraumatic.   Eyes:      Pupils: Pupils are equal, round, and reactive to light.   Cardiovascular:      Rate and Rhythm: Normal rate and regular rhythm.      Heart sounds: No murmur heard.     No friction rub. No gallop.   Pulmonary:      Effort: Pulmonary effort is normal.      Breath sounds: Normal breath sounds. No wheezing or rales.   Abdominal:      General: Abdomen is flat. Bowel sounds are normal.      Palpations: Abdomen is soft.      Tenderness: There is no abdominal tenderness.   Musculoskeletal:         General: No swelling.   Skin:     Comments: Several small, tender, subcutaneous nodules R parietal scalp   Neurological:      Mental Status: She is alert.       Administrative Statements         "

## 2024-06-27 LAB
APPEARANCE UR: ABNORMAL
BACTERIA URNS QL MICRO: ABNORMAL
BILIRUB UR QL STRIP: NEGATIVE
C3 SERPL-MCNC: 162 MG/DL (ref 82–167)
C4 SERPL-MCNC: 28 MG/DL (ref 12–38)
CASTS URNS QL MICRO: ABNORMAL /LPF
COLOR UR: YELLOW
CRP SERPL-MCNC: 11 MG/L (ref 0–10)
CRYSTALS URNS MICRO: ABNORMAL
DSDNA AB SER-ACNC: <1 IU/ML (ref 0–9)
EPI CELLS #/AREA URNS HPF: ABNORMAL /HPF (ref 0–10)
ERYTHROCYTE [SEDIMENTATION RATE] IN BLOOD BY WESTERGREN METHOD: 20 MM/HR (ref 0–40)
GLUCOSE UR QL: NEGATIVE
HGB UR QL STRIP: NEGATIVE
KETONES UR QL STRIP: ABNORMAL
LEUKOCYTE ESTERASE UR QL STRIP: NEGATIVE
MICRO URNS: ABNORMAL
MUCOUS THREADS URNS QL MICRO: PRESENT
NITRITE UR QL STRIP: NEGATIVE
PH UR STRIP: 5 [PH] (ref 5–7.5)
PROT UR QL STRIP: ABNORMAL
RBC #/AREA URNS HPF: ABNORMAL /HPF (ref 0–2)
SP GR UR: >=1.03 (ref 1–1.03)
THYROGLOB AB SERPL-ACNC: <1 IU/ML (ref 0–0.9)
THYROPEROXIDASE AB SERPL-ACNC: 15 IU/ML (ref 0–34)
UNIDENT CRYS URNS QL MICRO: PRESENT
UROBILINOGEN UR STRIP-ACNC: 1 MG/DL (ref 0.2–1)
WBC #/AREA URNS HPF: ABNORMAL /HPF (ref 0–5)

## 2024-07-03 DIAGNOSIS — E03.8 OTHER SPECIFIED HYPOTHYROIDISM: ICD-10-CM

## 2024-07-03 RX ORDER — LEVOTHYROXINE SODIUM 0.05 MG/1
TABLET ORAL
Qty: 90 TABLET | Refills: 1 | Status: SHIPPED | OUTPATIENT
Start: 2024-07-03

## 2024-07-12 ENCOUNTER — OFFICE VISIT (OUTPATIENT)
Dept: NEUROLOGY | Facility: CLINIC | Age: 69
End: 2024-07-12
Payer: COMMERCIAL

## 2024-07-12 VITALS
HEIGHT: 65 IN | DIASTOLIC BLOOD PRESSURE: 82 MMHG | SYSTOLIC BLOOD PRESSURE: 140 MMHG | BODY MASS INDEX: 27.49 KG/M2 | WEIGHT: 165 LBS | HEART RATE: 93 BPM

## 2024-07-12 DIAGNOSIS — G25.0 TREMOR, ESSENTIAL: ICD-10-CM

## 2024-07-12 DIAGNOSIS — G95.9 CERVICAL MYELOPATHY WITH CERVICAL RADICULOPATHY  (HCC): Primary | ICD-10-CM

## 2024-07-12 DIAGNOSIS — L74.519 FOCAL HYPERHIDROSIS: ICD-10-CM

## 2024-07-12 DIAGNOSIS — Z86.69 HISTORY OF MIGRAINE HEADACHES: ICD-10-CM

## 2024-07-12 DIAGNOSIS — G62.9 PERIPHERAL NEUROPATHY: ICD-10-CM

## 2024-07-12 DIAGNOSIS — M48.02 CERVICAL SPINAL STENOSIS: ICD-10-CM

## 2024-07-12 DIAGNOSIS — F51.01 PRIMARY INSOMNIA: ICD-10-CM

## 2024-07-12 DIAGNOSIS — M54.12 CERVICAL MYELOPATHY WITH CERVICAL RADICULOPATHY  (HCC): Primary | ICD-10-CM

## 2024-07-12 DIAGNOSIS — M54.2 CERVICALGIA: ICD-10-CM

## 2024-07-12 PROCEDURE — 99215 OFFICE O/P EST HI 40 MIN: CPT | Performed by: NURSE PRACTITIONER

## 2024-07-12 RX ORDER — GABAPENTIN 300 MG/1
300 CAPSULE ORAL 3 TIMES DAILY
Qty: 270 CAPSULE | Refills: 3 | Status: SHIPPED | OUTPATIENT
Start: 2024-07-12

## 2024-07-12 NOTE — PROGRESS NOTES
Patient ID: Ina Tolbert is a 68 y.o. female.    Assessment/Plan:     Diagnoses and all orders for this visit:    Cervical myelopathy with cervical radiculopathy  (HCC)  -     gabapentin (NEURONTIN) 300 mg capsule; Take 1 capsule (300 mg total) by mouth 3 (three) times a day    Cervical spinal stenosis  Comments:  Follow up with NeuroSurgery re: onset of neck pain  PT for onset of neck pain  Orders:  -     Ambulatory Referral to Comprehensive Spine PT; Future  -     XR spine cervical 2 or 3 vw injury; Future    Cervicalgia  Comments:  XR of the Cervical Spine, then referred to PT  Continue Cymbalta per psychiatry  Tylenol for cervicogenic headache  Orders:  -     Ambulatory Referral to Comprehensive [facility] PT; Future  -     XR spine cervical 2 or 3 vw injury; Future    Peripheral neuropathy  Comments:  Increase Gabapentin to 300mg 3x a day due to onset of neck pain/cervicogenic headache  Cymbalta per Psychiatry  Orders:  -     gabapentin (NEURONTIN) 300 mg capsule; Take 1 capsule (300 mg total) by mouth 3 (three) times a day    Primary insomnia  Comments:  Decrease melatonin dosing    History of migraine headaches  Comments:  continue magnesium 400mg daily    Focal hyperhidrosis  Comments:  Speak with PCP or Rheumatology re: poss cause for increased sweating of the head, poss d/t increased melatonin intake    Tremor, essential  Comments:  monitor for increased activity, no medication at this time.       Decrease Melatonin dosing at night.  Reach out to PCP or Rheumatology to see if there is other cause for increased sweating, primarily to the scalp  Increase oral hydration  Continue with daily magnesium 400mg daily  Contact NS for eval of neck pain  Can increase gabapentin to 300mg 3x a day, if the 3rd dose is not helpful during the day can take it at bedtime for total of 600mg at bedtime instead.   Tylenol for headache   Referral for PT for neck pain  XR of the cervical spine  Continue Cymbalta per  "Psychiatry  Follow up with neurology office in 5 months or sooner     Subjective/HPI:  Ina Tolbert is a 67yo female who follows in the outpatient neurology office for medical management of neuropathy, fibromyalgia, cervical radiculopathy and dizziness.    Dizziness:  Patient has PMH of orthostatic hypotension, started on midodrine at 10 mg 3 times daily.  She is less incidence of the dizziness and had loop recorder placed for monitoring.  Patient was having intermittent headaches, she had multiple adjustments to her antihypertensives continues to follow-up with her cardiologist.  Patient also had a decrease in her midodrine dosages.  Patient has had on and off again episodes of dizziness, last office appointment she was reporting minimal episodes and noted that her blood pressure had been stable and was only taking midodrine 10 mg daily.  Patient had a hospitalization as well, reported that her headaches had improved since her hospitalization and felt it was related to chronic disease.  She started on Remicade for this and believes some of her symptoms were related to start of this medication.  Patient also reported at that time having some episodes of shortness of breath and pressure in her chest which she felt was causing her some lightheadedness.  Usually when she is exerting herself or feels increased pressure in her chest, recommended pulmonary evaluation and cardiology follow-up.    Patient reports back to neurology office today, she denies having any significant issues with dizzy spells.  Patient did have 1 episode on Monday, 7/8/2024 where she was at work and she had a couple of seconds where she just \"blacked out\".  She noted no signs or symptoms prior to that however noted the day afterwards, she had some excessive nausea and did not feel well.  Patient stated that she did begin to eat more, felt better after eating and has had no further events.  It is unclear as to what may have triggered this " episode.  Patient states that she generally does not eat much in the morning, she was encouraged to increase her oral hydration, regular meals.  She has no history of seizures and is currently unable to describe the episode further.  She will update neurology office if she should have recurrent episode at which point we will get EEG.      Cervical lumbar radiculopathy:  She has had issues with lumbar radiculopathy, is on gabapentin for this.  MRI of the lumbar spine with asymmetric L4-5 foraminal stenosis.  She has gone through cervical anterior fusion at C3-5 with persistent left C4-6 foraminal stenosis indicating possible radiculitis.  She had been evaluated by neurosurgery for this.  Patient then had psychiatric hospital admission, her medications were adjusted and her gabapentin was decreased, Cymbalta 60 mg daily with magnesium and Seroquel at bedtime were added.  Patient saw neurosurgery through [facility], ordered EMG to rule out ulnar and median neuropathy.  2 been doing well on the Cymbalta and the gabapentin, was taking gabapentin twice a day rather than 3 times a day and felt this controlled her pain well.  She then started to get some upper middle back spasming along the paraspinals and took her 's baclofen.  She stated the baclofen did assist in relaxing the muscle tension.  She did not continue the medication once the tension was alleviated.  Patiently reportedly drink quinine to assist with her muscle spasming intentions, she likes tonic water and drinks when she is having muscle cramping.  Denied adequate hydration with water during the day.  Encouraged her to increase her oral hydration.  Patient continued on magnesium 4 mg twice a day, advised she could trial co-Q10 over-the-counter as well.    Patient reports that approximately 1 month ago she developed onset of neck pain different from her chronic pain.  She has some limited range of motion and noted that the pain is more  intensified at nighttime.  She will also wake up in the morning with stiff neck and pain.  Patient denies having any trauma or falls that would impact her neck.  She states she has not seen her neurosurgery provider in a couple of years.  She was to follow-up with them due to another area in her cervical spine that they wanted to surveilled.  Question if there is some change to this area.  Will get cervical x-rays to ensure no other injury, patient will be making an appointment with her neurosurgery team to discuss this further.  She does have left upper extremity weakness 5-/5 on exam, is stronger but has give way on testing. Negative for Malik's,  adequate and patient has normal reflex throughout.  Pt also reports some tension type headache in the back of her head and neck, suspect related to the cervicalgia.  PT referral given as well to start while awaiting NS eval.   Will increase gabapentin to 300mg 3x a day, if more pain at night and daytime dose ineffective, can take 300mg in am and 600mg at night.   She remains on Cymbalta per her psychiatric provider.        Neuropathy:  EMG of the lower extremities in March 2020 showed left-sided superficial peroneal neuropathy otherwise normal, she continued on her Cymbalta and gabapentin.  She is continue to have good relief with the medication regimen prescribed.    Pt feels that the Gabapentin and Cymbalta continue to work well for her neuropathy, will be increasing her daily dosing to 3x a day.    Posthospitalization/Follow-up  Patient is also hospital follow-up after consultation on 6/18/2024 for scalp pain, fibromyalgia, neuropathy.  She had sudden onset of pain in the scalp region, originally reported as temporal pain, describes it as throbbing in the posterior parietal region of the scalp, very painful to touch.  Patient indicated that her pain is not like her migraine pain not in the face or the temporal region, noted to the posterior parietal region,  significant pain there.  She did have a couple of bumps on evaluation and it was recommended to follow-up with dermatology.    Pt stated that it did take some time, however, the bumps did go away. She reports increased diaphoresis of the scalp but she had no further pain in the scalp in those areas.    Migraine:  Pt has had history of migraine, this has been well controlled and has not had any migraines over a longer period of time.  She denies any frontal or occipital migraine activity.   She reports that she has had some increase in posterior headaches recently, poss cervicogenic in nature, she takes Tylenol and this is effective for her.     Additional issues:  Patient also reported today that she has had an increase in sweating of the scalp.  She notes that her scalp will become increasingly diaphoretic, noted this over the past 3 weeks.  Question if this is related to the increased heat that we have experienced in the weather versus other hormonal options.  Patient also reported having issues with her insomnia, about a month ago she increased her melatonin to 20 mg nightly.  Reviewed side effects of melatonin, question of increased sweating related to the increase in melatonin hormone.  She does follow with PCP as well as rheumatologist, encouraged her to discuss with them any possible hormonal or autoimmune etiology.  Also recommended she decrease her melatonin back to 10 mg nightly.    Patient has concerns with regards to her insomnia which is why she increased her melatonin to begin with.  She will decrease it back to 10 mg.  She does take Cymbalta, encouraged her to take Cymbalta during the morning which patient currently does.  She is on Seroquel at bedtime, she has been out of 100 mg daily for extended period of time.  Patient feels she may have intolerance to that we will be talking to her psychiatric provider as well for possible medication adjustments.    Patient also reports having issues with upper  extremity tremor that started about 3 months ago.  Patient states that her left hand will tremor with action.  No tremor noted at rest.  Patient had mild tremoring of the left upper extremity with her thumb and fingers during pronator drift testing however no excessive action tremor seen.  She has had recent labs and has not had history of Vitamin D or B12 deficiencies.  Mag was 2.0 in [place]  Patient indicated that she is right-hand dominant, she does not use her left hand much.  She does note that her left upper extremity does seem to be a little bit weaker but this has been ongoing for some time.  She has had an onset of neck pain as well as previously discussed, question if cervical radicular symptoms may be related to her weakness and possible tremor versus essential tremor.  It is not limiting of patient's abilities to function or provide her own ADLs.  We discussed medications with primidone however at this point in time since that is nonlimiting will defer on medication options for now.      The following portions of the patient's history were reviewed and updated as appropriate: allergies, current medications, past family history, past medical history, past social history, past surgical history, and problem list.        Past Medical History:   Diagnosis Date    Abdominal adhesions     Alcoholism ([facility]) 1996    in remission    Anesthesia complication     difficult to wake up    Anxiety     Arthritis     Asthma     with exertion    Cancer (HCC)     melanoma    Colon polyp     Crohn's disease (HCC)     Depression     Depression     Disease of thyroid gland     Fibromyalgia     Fibromyalgia, primary     Fractures 2006    GERD (gastroesophageal reflux disease)     History of cholecystectomy 09/07/2019    Hyperlipidemia     Infected tooth 01/2024    Tooth #14-was treated with antibiotics-endodontal appt 2/1 may need root canal    Irregular heart beat     can be tachy; also has orthoststic hypotension    Irritable  bowel syndrome 1990    Lazy eye     resolved: 3/27/17    Medical clearance for psychiatric admission 07/13/2021    Melanoma (HCC) 2010    Mild asthma 11/04/2020    Osteoarthritis 07/2018    Osteopenia     with joint pain-elevated DEV    Polymyalgia (HCC)     Psychiatric disorder     Shortness of breath     assoc with tachycardia    Stomach disorder 1995    Tinnitus     Wears glasses     for reading       Past Surgical History:   Procedure Laterality Date    ABDOMINAL SURGERY      lysis of adhesions x 2    APPENDECTOMY      CERVICAL FUSION      May 5,2021 and Jan. 01,2020    CHOLECYSTECTOMY      open    COLONOSCOPY      DILATION AND CURETTAGE OF UTERUS      FOOT SURGERY  05/2017    NECK SURGERY  01/2019 5/2021    NEUROMA EXCISION Right 05/26/2017    Procedure: EXCISION MASS / FIBROMA FOOT;  Surgeon: Jorden Crespo DPM;  Location: [facility];  Service:     PARS PLANA VITRECTOMY W/ REPAIR OF MACULAR HOLE  12/23/2020    AL ARTHRP KNE CONDYLE&PLATU MEDIAL&LAT COMPARTMENTS Right 2/6/2024    Procedure: ARTHROPLASTY KNEE TOTAL W ROBOT - RIGHT - PRESS FIT - OVERNIGHT;  Surgeon: Jairon Kim DO;  Location: [facility];  Service: Orthopedics    AL XCAPSL CTRC RMVL INSJ IO LENS PROSTH W/O ECP Left 12/06/2021    Procedure: EXTRACTION EXTRACAPSULAR CATARACT PHACO INTRAOCULAR LENS (IOL);  Surgeon: Mandeep Chavez MD;  Location: [facility] MAIN OR;  Service: Ophthalmology    RIGHT OOPHORECTOMY      UPPER GASTROINTESTINAL ENDOSCOPY  5/2019    WISDOM TOOTH EXTRACTION      x4       Social History     Socioeconomic History    Marital status: /Civil Union     Spouse name: None    Number of children: None    Years of education: None    Highest education level: None   Occupational History    None   Tobacco Use    Smoking status: Never     Passive exposure: Never    Smokeless tobacco: Never   Vaping Use    Vaping status: Never Used   Substance and Sexual Activity    Alcohol use: Not Currently    Drug use: No    Sexual activity: Not  Currently     Partners: Male   Other Topics Concern    None   Social History Narrative    None     Social Determinants of Health     Financial Resource Strain: Not on file   Food Insecurity: No Food Insecurity (6/18/2024)    Hunger Vital Sign     Worried About Running Out of Food in the Last Year: Never true     Ran Out of Food in the Last Year: Never true   Transportation Needs: No Transportation Needs (6/18/2024)    PRAPARE - Transportation     Lack of Transportation (Medical): No     Lack of Transportation (Non-Medical): No   Physical Activity: Not on file   Stress: Not on file   Social Connections: Not on file   Intimate Partner Violence: Not on file   Housing Stability: Low Risk  (6/18/2024)    Housing Stability Vital Sign     Unable to Pay for Housing in the Last Year: No     Number of Times Moved in the Last Year: 0     Homeless in the Last Year: No       Family History   Problem Relation Age of Onset    Heart disease Mother     Stroke Mother 62    COPD Mother     Arthritis Mother     Asthma Mother     Hypertension Father     Dislocations Sister     Multiple sclerosis Sister     Neurological problems Sister     Scoliosis Sister     Depression Sister     Mental illness Sister     No Known Problems Maternal Grandmother     No Known Problems Maternal Grandfather     No Known Problems Paternal Grandmother     No Known Problems Paternal Grandfather     Kidney disease Brother         kidney transplant    No Known Problems Maternal Aunt     No Known Problems Maternal Uncle     No Known Problems Paternal Aunt     No Known Problems Paternal Uncle     ADD / ADHD Neg Hx     Anesthesia problems Neg Hx     Cancer Neg Hx     Clotting disorder Neg Hx     Collagen disease Neg Hx     Diabetes Neg Hx     Dislocations Neg Hx     Learning disabilities Neg Hx     Neurological problems Neg Hx     Osteoporosis Neg Hx     Rheumatologic disease Neg Hx     Scoliosis Neg Hx     Vascular Disease Neg Hx          Current Outpatient  "Medications:     Calcium Carb-Cholecalciferol 600-12.5 MG-MCG CAPS, Take by mouth daily in the early morning, Disp: , Rfl:     docusate sodium (COLACE) 100 mg capsule, Take 1 capsule (100 mg total) by mouth 2 (two) times a day, Disp: 60 capsule, Rfl: 0    DULoxetine (CYMBALTA) 60 mg delayed release capsule, Take 1 capsule (60 mg total) by mouth daily, Disp: 90 capsule, Rfl: 1    esomeprazole (NexIUM) 40 MG capsule, TAKE 1 CAPSULE BY MOUTH EVERY DAY BEFORE BREAKFAST, Disp: 90 capsule, Rfl: 1    famotidine (PEPCID) 20 mg tablet, Take 1 tablet (20 mg total) by mouth daily as needed for heartburn, Disp: 90 tablet, Rfl: 3    gabapentin (NEURONTIN) 300 mg capsule, Take 1 capsule (300 mg total) by mouth 3 (three) times a day, Disp: 270 capsule, Rfl: 3    levothyroxine 50 mcg tablet, TAKE 1 TABLET BY MOUTH EVERY DAY, Disp: 90 tablet, Rfl: 1    midodrine (PROAMATINE) 5 mg tablet, Take 5 mg by mouth daily, Disp: , Rfl:     QUEtiapine (SEROquel) 100 mg tablet, Take 1 tablet (100 mg total) by mouth daily at bedtime, Disp: 90 tablet, Rfl: 1    Sodium Fluoride 5000 PPM 1.1 % PSTE, USE ONCE DAILY PREFERABLY AT NIGHT, Disp: , Rfl:     albuterol (ProAir HFA) 90 mcg/act inhaler, Inhale 2 puffs every 6 (six) hours as needed for wheezing (Patient not taking: Reported on 7/12/2024), Disp: 8.5 g, Rfl: 0    amoxicillin (AMOXIL) 500 MG tablet, Take 4 tablets (2,000 mg total) by mouth 60 minutes pre-procedure (Patient not taking: Reported on 7/12/2024), Disp: 8 tablet, Rfl: 2    Allergies   Allergen Reactions    Meperidine Lightheadedness and Other (See Comments)    Sulfa Antibiotics Hives        Blood pressure 140/82, pulse 93, height 5' 5\" (1.651 m), weight 74.8 kg (165 lb).       Objective:    Blood pressure 140/82, pulse 93, height 5' 5\" (1.651 m), weight 74.8 kg (165 lb).    Physical Exam  Vitals reviewed.   Constitutional:       Appearance: Normal appearance. She is well-developed.   HENT:      Head: Normocephalic.      Nose: Nose " normal.      Mouth/Throat:      Mouth: Mucous membranes are moist.   Eyes:      General: Lids are normal.      Extraocular Movements: Extraocular movements intact.      Pupils: Pupils are equal, round, and reactive to light.   Pulmonary:      Effort: Pulmonary effort is normal.   Abdominal:      Palpations: Abdomen is soft.   Musculoskeletal:      Cervical back: Normal range of motion.   Skin:     General: Skin is warm and dry.   Neurological:      Mental Status: She is alert.      Coordination: Coordination is intact. Romberg sign negative.      Deep Tendon Reflexes: Reflexes are normal and symmetric.   Psychiatric:         Attention and Perception: Attention and perception normal.         Mood and Affect: Mood and affect normal.         Speech: Speech normal.         Behavior: Behavior normal. Behavior is cooperative.         Thought Content: Thought content normal.         Cognition and Memory: Cognition and memory normal.         Judgment: Judgment normal.         Neurological Exam  Mental Status  Alert. Oriented to person, place, time and situation. Memory is normal. Recent and remote memory are intact. Speech is normal. Language is fluent with no aphasia. Attention and concentration are normal. Fund of knowledge is appropriate for level of education.    Cranial Nerves  CN II: Visual acuity is normal. Visual fields full to confrontation.  CN III, IV, VI: Extraocular movements intact bilaterally. Normal lids and orbits bilaterally. Pupils equal round and reactive to light bilaterally.  CN V: Facial sensation is normal.  CN VII: Full and symmetric facial movement.  CN VIII: Hearing is normal.  CN IX, X: Palate elevates symmetrically. Normal gag reflex.  CN XI: Shoulder shrug strength is normal.  CN XII: Tongue midline without atrophy or fasciculations.    Motor  Normal muscle bulk throughout. Normal muscle tone. The following abnormal movements were seen: Mild action tremor of the left thumb and fingers.    Strength is 5/5 in all four extremities except as noted. No pronator drift.                                             Right                     Left  Deltoid                                                            5-   Biceps                                                            5-  Brachioradialis                                               5-   Triceps                                                           5-    Sensory  Light touch is normal in upper and lower extremities. Temperature is normal in upper and lower extremities. Vibration is normal in upper and lower extremities. Proprioception is normal in upper and lower extremities.     Reflexes  Deep tendon reflexes are 2+ and symmetric in all four extremities.    Right pathological reflexes: Malik's absent.  Left pathological reflexes: Malik's absent.    Coordination    Finger-to-nose, rapid alternating movements and heel-to-shin normal bilaterally without dysmetria.    Gait  Casual gait is normal including stance, stride, and arm swing.Normal toe walking. Normal heel walking. Normal tandem gait. Romberg is absent. Able to rise from chair without using arms.        ROS:    Review of Systems   Constitutional:  Positive for diaphoresis. Negative for chills and fever.   HENT:  Negative for ear pain and sore throat.    Eyes:  Negative for pain and visual disturbance.   Respiratory:  Negative for cough and shortness of breath.    Cardiovascular:  Negative for chest pain and palpitations.   Gastrointestinal:  Positive for nausea. Negative for abdominal pain and vomiting.   Genitourinary:  Negative for dysuria and hematuria.   Musculoskeletal:  Positive for neck pain. Negative for arthralgias and back pain.   Skin:  Negative for color change and rash.   Neurological:  Positive for dizziness, tremors and light-headedness. Negative for seizures and syncope.   All other systems reviewed and are negative.        ROS reviewed and d/w the pt.    I have  spent a total time of 45 minutes in caring for this patient on the day of the visit/encounter including Risks and benefits of tx options, Instructions for management, Patient and family education, Counseling / Coordination of care, Documenting in the medical record, Reviewing / ordering tests, medicine, procedures  , and Obtaining or reviewing history  .

## 2024-07-12 NOTE — PATIENT INSTRUCTIONS
Decrease Melatonin dosing at night.  Reach out to PCP or Rheumatology to see if there is other cause for increased sweating, primarily to the scalp  Increase oral hydration  Continue with daily magnesium 400mg daily  Contact NS for eval of neck pain  Can increase gabapentin to 300mg 3x a day, if the 3rd dose is not helpful during the day can take it at bedtime for total of 600mg at bedtime instead.   Tylenol for headache   Referral for PT for neck pain  XR of the cervical spine  Continue Cymbalta per Psychiatry  Follow up with neurology office in 5 months or sooner

## 2024-08-08 ENCOUNTER — OFFICE VISIT (OUTPATIENT)
Dept: PULMONOLOGY | Facility: MEDICAL CENTER | Age: 69
End: 2024-08-08
Payer: COMMERCIAL

## 2024-08-08 VITALS
DIASTOLIC BLOOD PRESSURE: 82 MMHG | HEART RATE: 95 BPM | OXYGEN SATURATION: 98 % | HEIGHT: 65 IN | RESPIRATION RATE: 12 BRPM | WEIGHT: 164.8 LBS | SYSTOLIC BLOOD PRESSURE: 122 MMHG | BODY MASS INDEX: 27.46 KG/M2 | TEMPERATURE: 97.5 F

## 2024-08-08 DIAGNOSIS — J45.40 MODERATE PERSISTENT ASTHMA WITHOUT COMPLICATION: Primary | ICD-10-CM

## 2024-08-08 DIAGNOSIS — J47.9 BRONCHIECTASIS WITHOUT COMPLICATION (HCC): ICD-10-CM

## 2024-08-08 DIAGNOSIS — R93.89 ABNORMAL CT OF THE CHEST: ICD-10-CM

## 2024-08-08 DIAGNOSIS — J20.9 ACUTE BRONCHITIS: ICD-10-CM

## 2024-08-08 DIAGNOSIS — J45.20 MILD INTERMITTENT ASTHMA WITHOUT COMPLICATION: ICD-10-CM

## 2024-08-08 DIAGNOSIS — J84.9 ILD (INTERSTITIAL LUNG DISEASE) (HCC): ICD-10-CM

## 2024-08-08 PROCEDURE — 99214 OFFICE O/P EST MOD 30 MIN: CPT | Performed by: STUDENT IN AN ORGANIZED HEALTH CARE EDUCATION/TRAINING PROGRAM

## 2024-08-08 RX ORDER — FLUTICASONE PROPIONATE AND SALMETEROL 100; 50 UG/1; UG/1
1 POWDER RESPIRATORY (INHALATION) 2 TIMES DAILY
Qty: 60 BLISTER | Refills: 0 | Status: SHIPPED | OUTPATIENT
Start: 2024-08-08 | End: 2024-08-08

## 2024-08-08 RX ORDER — FLUTICASONE PROPIONATE AND SALMETEROL 100; 50 UG/1; UG/1
1 POWDER RESPIRATORY (INHALATION) 2 TIMES DAILY
Qty: 60 BLISTER | Refills: 3 | Status: SHIPPED | OUTPATIENT
Start: 2024-08-08

## 2024-08-08 RX ORDER — SODIUM FLUORIDE 1.1 G/100G
CREAM ORAL
COMMUNITY
Start: 2024-07-16

## 2024-08-08 RX ORDER — ALBUTEROL SULFATE 90 UG/1
2 AEROSOL, METERED RESPIRATORY (INHALATION) EVERY 6 HOURS PRN
Qty: 8.5 G | Refills: 6 | Status: SHIPPED | OUTPATIENT
Start: 2024-08-08

## 2024-08-08 NOTE — PROGRESS NOTES
Consultation - Pulmonary Medicine   Ina Tolbert 68 y.o. female MRN: [id number]      Reason for Consult: Asthma    Ina Tolbert is a 68 y.o. female with a PMH of Cervical Stenosis, Chron's, Orthostatic Hypotension, TIA, HTN, Hypothyroidism, Depression, Raynaud's who presents to establish care.     Asthma/Bronchiectasis - CT shows moscaism consistent with a bronchoconstrictive process with mild bronchiectasis. She has a complex history with a myriad of rheumatologic processes. GI considering she may not have defined IBD due to concurrent infection/negative biopsies and has stopped mesalamine. She has + Raynaud's and other rheumatologic symptoms. Plan to finish the rheumatologic evaluation for CTD-ILD. Her symptoms may be consistent with asthma however NE panel was negative, did not have a BD response. PFTs showed borderline restriction with a decreased DLCO. If PFTs continue to progress a lung biopsy may be necessary.   - Methacholine challenge/PFTs  - Albuterol PRN  - Myositis, RNP, CK, Aldolase, Sjogrens Ab, Histone  - Follow up 3 months    Edward Lobo MD  [facility]    _____________________________________________________________________  Interval Hx 08/08/24:     SOB slowly worsened  Exercise tolerance Limited by joint pain -dyspnea with 1/2 mile, 1 flight  Occasionally dizziness, orthostatic   GI doctor does not think she has chron's - stopped mesalamine, Infliximab  CRP elevated, DEV 1:160(homogenous)  Albuterol Minimally  [place] panel negative, Immunoglobulins Normal,   Scattered Joint Pian, + Raynauds, + Sicca    HPI:    Ina Tolbert is a 68 y.o. female with a PMH of Cervical Stenosis, Chron's, Orthostatic Hypotension, TIA, HTN, Hypothyroidism, Depression, Raynaud's who presents to establish care.     Recent hospitalization due to chron's ? 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    1/24/24         FVC  2.11 69%         FEV1 1.74 73%         FEV1/FVC 82%         TLC 4.18L 80%         RV 84%         DLCO 16.49 64%         BD Neg               Imaging:  I personally reviewed the images on the PAC system pertinent to today's visit  CT chest  1.  No filling defect to suggest acute or chronic pulmonary embolism in the entire pulmonary arterial system.  2.  Suggestion of mosaic lung parenchymal attenuation compatible with mild small vessel/small airways disease.  3.  No acute findings in the abdomen or pelvis.    Other studies:  Laboratory Studies  6/2020- DEV 1:80, speckled pattern  SSA/SSB negative    1/22- SPEP wnl  ESR/CRP wnl  CPK,aldolase wnl  Hep B/C negative  RF/CCP negative  DEV 1:160 speckled, DEV panel negative  SM/RNP Negative  Aknwal-1 Negative  Complement normal     TTE      Left Ventricle: Left ventricular cavity size is normal. Wall thickness is normal. The left ventricular ejection fraction is 60%. Systolic function is normal. Wall motion is normal. Diastolic function is normal.  Left atrial filling pressure is normal.    Left Atrium: The atrium is normal in size.    IVC/SVC: The right atrial pressure is estimated at 3.0 mmHg. The inferior vena cava is normal in size. Respirophasic changes were normal.    PhysicalExamination:  Vitals:   /82 (BP Location: Right arm, Patient Position: Sitting, Cuff Size: Standard)   Pulse 95   Temp 97.5 °F (36.4 °C) (Temporal)   Resp 12   Ht 5' 5\" (1.651 m)   Wt 74.8 kg (164 lb 12.8 oz)   LMP  (LMP Unknown)   SpO2 98%   BMI 27.42 kg/m²     Appearance -- NAD, " speaking full sentences  Neuro -- A&Ox3  Neck -- no JVD  Heart -- RRR, no murmurs  Lungs -- scattered squeaks  Abdomen -- soft, NTND  Extremities -- WWP, no LE edema  Skin -- no rash    Review of Systems:  Aside from what is mentioned in the HPI, the review of systems otherwise negative.    Immunization History   Administered Date(s) Administered    COVID-19 J&J ([place]) vaccine 0.5 mL 04/28/2021    COVID-19 MODERNA VACC 0.25 ML IM BOOSTER 01/25/2022    COVID-19 MODERNA VACC 0.5 ML IM 01/25/2022    INFLUENZA 09/21/2012, 03/04/2014, 12/12/2014, 03/05/2018, 09/27/2018, 11/18/2019, 11/19/2020    Influenza Quadrivalent Preservative Free 3 years and older IM 03/05/2018    Influenza, high dose seasonal 0.7 mL 11/10/2021, 11/24/2022    Influenza, recombinant, quadrivalent,injectable, preservative free 10/07/2020    Pneumococcal Conjugate 13-Valent 09/05/2019, 09/05/2019, 11/10/2021    Tdap 09/21/2012, 11/25/2020    Tuberculin Skin Test-PPD Intradermal 08/03/2021    Zoster Vaccine Recombinant 10/07/2020        Current Medications:    Current Outpatient Medications:     Calcium Carb-Cholecalciferol 600-12.5 MG-MCG CAPS, Take by mouth daily in the early morning, Disp: , Rfl:     Denta 5000 Plus 1.1 % CREA, USE ONCE DAILY AT NIGHT, Disp: , Rfl:     docusate sodium (COLACE) 100 mg capsule, Take 1 capsule (100 mg total) by mouth 2 (two) times a day, Disp: 60 capsule, Rfl: 0    DULoxetine (CYMBALTA) 60 mg delayed release capsule, Take 1 capsule (60 mg total) by mouth daily, Disp: 90 capsule, Rfl: 1    esomeprazole (NexIUM) 40 MG capsule, TAKE 1 CAPSULE BY MOUTH EVERY DAY BEFORE BREAKFAST, Disp: 90 capsule, Rfl: 1    famotidine (PEPCID) 20 mg tablet, Take 1 tablet (20 mg total) by mouth daily as needed for heartburn, Disp: 90 tablet, Rfl: 3    gabapentin (NEURONTIN) 300 mg capsule, Take 1 capsule (300 mg total) by mouth 3 (three) times a day, Disp: 270 capsule, Rfl: 3    levothyroxine 50 mcg tablet, TAKE 1 TABLET BY MOUTH EVERY  DAY, Disp: 90 tablet, Rfl: 1    midodrine (PROAMATINE) 5 mg tablet, Take 5 mg by mouth daily, Disp: , Rfl:     QUEtiapine (SEROquel) 100 mg tablet, Take 1 tablet (100 mg total) by mouth daily at bedtime, Disp: 90 tablet, Rfl: 1    Sodium Fluoride 5000 PPM 1.1 % PSTE, USE ONCE DAILY PREFERABLY AT NIGHT, Disp: , Rfl:     albuterol (ProAir HFA) 90 mcg/act inhaler, Inhale 2 puffs every 6 (six) hours as needed for wheezing (Patient not taking: Reported on 7/12/2024), Disp: 8.5 g, Rfl: 0    Historical Information   Past Medical History:   Diagnosis Date    Abdominal adhesions     Alcoholism (HCC) 1996    in remission    Anesthesia complication     difficult to wake up    Anxiety     Arthritis     Asthma     with exertion    Cancer (HCC)     melanoma    Colon polyp     Crohn's disease (HCC)     Depression     Depression     Disease of thyroid gland     Fibromyalgia     Fibromyalgia, primary     Fractures 2006    GERD (gastroesophageal reflux disease)     History of cholecystectomy 09/07/2019    Hyperlipidemia     Infected tooth 01/2024    Tooth #14-was treated with antibiotics-endodontal appt 2/1 may need root canal    Irregular heart beat     can be tachy; also has orthoststic hypotension    Irritable bowel syndrome 1990    Lazy eye     resolved: 3/27/17    Medical clearance for psychiatric admission 07/13/2021    Melanoma (HCC) 2010    Mild asthma 11/04/2020    Osteoarthritis 07/2018    Osteopenia     with joint pain-elevated DEV    Polymyalgia (HCC)     Psychiatric disorder     Shortness of breath     assoc with tachycardia    Stomach disorder 1995    Tinnitus     Wears glasses     for reading     Past Surgical History:   Procedure Laterality Date    ABDOMINAL SURGERY      lysis of adhesions x 2    APPENDECTOMY      CERVICAL FUSION      May 5,2021 and Jan. 01,2020    CHOLECYSTECTOMY      open    COLONOSCOPY      DILATION AND CURETTAGE OF UTERUS      FOOT SURGERY  05/2017    NECK SURGERY  01/2019 5/2021    NEUROMA  EXCISION Right 05/26/2017    Procedure: EXCISION MASS / FIBROMA FOOT;  Surgeon: Jorden Crespo DPM;  Location: [facility];  Service:     PARS PLANA VITRECTOMY W/ REPAIR OF MACULAR HOLE  12/23/2020    LA ARTHRP KNE CONDYLE&PLATU MEDIAL&LAT COMPARTMENTS Right 2/6/2024    Procedure: ARTHROPLASTY KNEE TOTAL W ROBOT - RIGHT - PRESS FIT - OVERNIGHT;  Surgeon: Jairon Kim DO;  Location: [facility];  Service: Orthopedics    LA XCAPSL CTRC RMVL INSJ IO LENS PROSTH W/O ECP Left 12/06/2021    Procedure: EXTRACTION EXTRACAPSULAR CATARACT PHACO INTRAOCULAR LENS (IOL);  Surgeon: Mandeep Chavez MD;  Location[facility] MAIN OR;  Service: Ophthalmology    RIGHT OOPHORECTOMY      UPPER GASTROINTESTINAL ENDOSCOPY  5/2019    WISDOM TOOTH EXTRACTION      x4     Social History   Social History     Tobacco Use   Smoking Status Never    Passive exposure: Never   Smokeless Tobacco Never       Family History:   Family History   Problem Relation Age of Onset    Heart disease Mother     Stroke Mother 62    COPD Mother     Arthritis Mother     Asthma Mother     Hypertension Father     Dislocations Sister     Multiple sclerosis Sister     Neurological problems Sister     Scoliosis Sister     Depression Sister     Mental illness Sister     No Known Problems Maternal Grandmother     No Known Problems Maternal Grandfather     No Known Problems Paternal Grandmother     No Known Problems Paternal Grandfather     Kidney disease Brother         kidney transplant    No Known Problems Maternal Aunt     No Known Problems Maternal Uncle     No Known Problems Paternal Aunt     No Known Problems Paternal Uncle     ADD / ADHD Neg Hx     Anesthesia problems Neg Hx     Cancer Neg Hx     Clotting disorder Neg Hx     Collagen disease Neg Hx     Diabetes Neg Hx     Dislocations Neg Hx     Learning disabilities Neg Hx     Neurological problems Neg Hx     Osteoporosis Neg Hx     Rheumatologic disease Neg Hx     Scoliosis Neg Hx     Vascular Disease Neg Hx   "                Diagnostic Data:  Labs:  I personally reviewed the most recent laboratory data pertinent to today's visit    Lab Results   Component Value Date    WBC 4.44 06/18/2024    HGB 12.8 06/18/2024    HCT 40.2 06/18/2024    MCV 83 06/18/2024     06/18/2024     Lab Results   Component Value Date    GLUCOSE 95 08/04/2016    CALCIUM 8.9 06/18/2024     08/04/2016    K 4.6 06/18/2024    CO2 24 06/18/2024     06/18/2024    BUN 15 06/18/2024    CREATININE 0.75 06/18/2024     No results found for: \"IGE\"  Lab Results   Component Value Date    ALT 20 06/17/2024    AST 21 06/17/2024    ALKPHOS 66 06/17/2024    BILITOT 0.2 08/04/2016           I have spent a total time of 25 minutes on 08/08/24 in caring for this patient including Diagnostic results, Prognosis, Risks and benefits of tx options, Instructions for management, Patient and family education, Importance of tx compliance, Risk factor reductions, Impressions, Counseling / Coordination of care, Documenting in the medical record, Reviewing / ordering tests, medicine, procedures  , Obtaining or reviewing history  , and Communicating with other healthcare professionals .   _        "

## 2024-08-13 ENCOUNTER — HOSPITAL ENCOUNTER (OUTPATIENT)
Dept: RADIOLOGY | Facility: HOSPITAL | Age: 69
Discharge: HOME | End: 2024-08-13
Payer: COMMERCIAL

## 2024-08-13 ENCOUNTER — CLINICAL SUPPORT (OUTPATIENT)
Dept: FAMILY MEDICINE CLINIC | Facility: CLINIC | Age: 69
End: 2024-08-13
Payer: COMMERCIAL

## 2024-08-13 VITALS — HEIGHT: 65 IN | WEIGHT: 160 LBS | BODY MASS INDEX: 26.66 KG/M2

## 2024-08-13 DIAGNOSIS — Z23 NEED FOR VACCINATION: Primary | ICD-10-CM

## 2024-08-13 PROCEDURE — 77080 DXA BONE DENSITY AXIAL: CPT

## 2024-08-13 PROCEDURE — 86580 TB INTRADERMAL TEST: CPT

## 2024-08-15 ENCOUNTER — CLINICAL SUPPORT (OUTPATIENT)
Dept: FAMILY MEDICINE CLINIC | Facility: CLINIC | Age: 69
End: 2024-08-15

## 2024-08-15 ENCOUNTER — OFFICE VISIT (OUTPATIENT)
Dept: PSYCHIATRY | Facility: CLINIC | Age: 69
End: 2024-08-15
Payer: COMMERCIAL

## 2024-08-15 VITALS
HEART RATE: 107 BPM | SYSTOLIC BLOOD PRESSURE: 131 MMHG | HEIGHT: 65 IN | BODY MASS INDEX: 26.99 KG/M2 | DIASTOLIC BLOOD PRESSURE: 80 MMHG | WEIGHT: 162 LBS

## 2024-08-15 DIAGNOSIS — F10.21 ALCOHOL DEPENDENCE IN REMISSION (HCC): Chronic | ICD-10-CM

## 2024-08-15 DIAGNOSIS — F43.10 PTSD (POST-TRAUMATIC STRESS DISORDER): Chronic | ICD-10-CM

## 2024-08-15 DIAGNOSIS — Z63.0 MARITAL CONFLICT: ICD-10-CM

## 2024-08-15 DIAGNOSIS — F41.1 GENERALIZED ANXIETY DISORDER: ICD-10-CM

## 2024-08-15 DIAGNOSIS — F33.41 MAJOR DEPRESSIVE DISORDER, RECURRENT, IN PARTIAL REMISSION (HCC): ICD-10-CM

## 2024-08-15 DIAGNOSIS — F63.0 GAMBLING DISORDER, MODERATE: ICD-10-CM

## 2024-08-15 DIAGNOSIS — F33.2 SEVERE EPISODE OF RECURRENT MAJOR DEPRESSIVE DISORDER, WITHOUT PSYCHOTIC FEATURES (HCC): Primary | ICD-10-CM

## 2024-08-15 DIAGNOSIS — F51.05 INSOMNIA RELATED TO ANOTHER MENTAL DISORDER: ICD-10-CM

## 2024-08-15 DIAGNOSIS — Z11.1 SCREENING FOR TUBERCULOSIS: Primary | ICD-10-CM

## 2024-08-15 LAB
INDURATION: 0 MM
TB SKIN TEST: NEGATIVE

## 2024-08-15 PROCEDURE — 99214 OFFICE O/P EST MOD 30 MIN: CPT | Performed by: NURSE PRACTITIONER

## 2024-08-15 PROCEDURE — 90833 PSYTX W PT W E/M 30 MIN: CPT | Performed by: NURSE PRACTITIONER

## 2024-08-15 SDOH — SOCIAL STABILITY - SOCIAL INSECURITY: PROBLEMS IN RELATIONSHIP WITH SPOUSE OR PARTNER: Z63.0

## 2024-08-20 ENCOUNTER — TELEPHONE (OUTPATIENT)
Dept: RHEUMATOLOGY | Facility: CLINIC | Age: 69
End: 2024-08-20

## 2024-08-20 NOTE — TELEPHONE ENCOUNTER
Pls let the patient know that we recommend follow-up appointment with our office to discuss the results of her bone density scan and next steps. Can be scheduled

## 2024-08-21 ENCOUNTER — TELEPHONE (OUTPATIENT)
Dept: RHEUMATOLOGY | Facility: CLINIC | Age: 69
End: 2024-08-21

## 2024-08-21 NOTE — TELEPHONE ENCOUNTER
The patient was called and a follow up appointment was scheduled to discuss her bone density test results.

## 2024-08-22 ENCOUNTER — HOSPITAL ENCOUNTER (OUTPATIENT)
Dept: PULMONOLOGY | Facility: HOSPITAL | Age: 69
End: 2024-08-22
Attending: STUDENT IN AN ORGANIZED HEALTH CARE EDUCATION/TRAINING PROGRAM
Payer: COMMERCIAL

## 2024-08-22 DIAGNOSIS — J45.40 MODERATE PERSISTENT ASTHMA WITHOUT COMPLICATION: ICD-10-CM

## 2024-08-22 PROCEDURE — 94760 N-INVAS EAR/PLS OXIMETRY 1: CPT

## 2024-08-22 PROCEDURE — 94070 EVALUATION OF WHEEZING: CPT

## 2024-08-22 PROCEDURE — 94726 PLETHYSMOGRAPHY LUNG VOLUMES: CPT

## 2024-08-22 PROCEDURE — 94070 EVALUATION OF WHEEZING: CPT | Performed by: STUDENT IN AN ORGANIZED HEALTH CARE EDUCATION/TRAINING PROGRAM

## 2024-08-22 PROCEDURE — 94060 EVALUATION OF WHEEZING: CPT

## 2024-08-22 RX ORDER — ALBUTEROL SULFATE 0.83 MG/ML
2.5 SOLUTION RESPIRATORY (INHALATION) ONCE AS NEEDED
Status: COMPLETED | OUTPATIENT
Start: 2024-08-22 | End: 2024-08-22

## 2024-08-22 RX ADMIN — ALBUTEROL SULFATE 2.5 MG: 2.5 SOLUTION RESPIRATORY (INHALATION) at 14:18

## 2024-08-22 RX ADMIN — METHACHOLINE CHLORIDE INHALATION SOLUTION 0.06 MG: KIT at 13:48

## 2024-08-23 NOTE — PSYCH
Visit Time    Visit Start Time: 8:30  Visit Stop Time: 9:00  Total Visit Duration:  30 minutes    Subjective:     Patient ID: Ina Tolbert is a 68 y.o. female.  History of anxiety, depression, seen for medication management and mood assessment.  Ina reports her  has cancer and his job may be in jeopardy.  She gave up her job to help him which causes great anxiety due to financial stress.  She had added one gabapentin due to neuropathy.  Medications help manage her moods.  She reports her appetite is fair to good, sleeps with the help of Seroquel.  Takes medication as prescribed.  She continues to be in recovery and denies alcohol use or gambling.  ELLY-7 score of 2 indicates no anxiety, PHQ-9 score of 0 indicates no depression or suicidal ideation    HPI ROS Appetite Changes and Sleep: normal appetite and normal energy level    Review Of Systems:     Mood Anxiety   Behavior Normal    Thought Content Normal   General Emotional Problems and Sleep Disturbances   Personality Normal   Other Psych Symptoms Normal   Constitutional As Noted in HPI   ENT As Noted in HPI   Cardiovascular As Noted in HPI   Respiratory As Noted in HPI   Gastrointestinal As Noted in HPI   Genitourinary As Noted in HPI   Musculoskeletal As Noted in HPI   Integumentary As Noted in HPI   Neurological As Noted in HPI   Endocrine Normal    Other Symptoms Normal        Laboratory Results:     Substance Abuse History:  Social History     Substance and Sexual Activity   Drug Use No       Family Psychiatric History:   Family History   Problem Relation Age of Onset    Heart disease Mother     Stroke Mother 62    COPD Mother     Arthritis Mother     Asthma Mother     Hypertension Father     Dislocations Sister     Multiple sclerosis Sister     Neurological problems Sister     Scoliosis Sister     Depression Sister     Mental illness Sister     No Known Problems Maternal Grandmother     No Known Problems Maternal Grandfather     No Known Problems  Paternal Grandmother     No Known Problems Paternal Grandfather     Kidney disease Brother         kidney transplant    No Known Problems Maternal Aunt     No Known Problems Maternal Uncle     No Known Problems Paternal Aunt     No Known Problems Paternal Uncle     ADD / ADHD Neg Hx     Anesthesia problems Neg Hx     Cancer Neg Hx     Clotting disorder Neg Hx     Collagen disease Neg Hx     Diabetes Neg Hx     Dislocations Neg Hx     Learning disabilities Neg Hx     Neurological problems Neg Hx     Osteoporosis Neg Hx     Rheumatologic disease Neg Hx     Scoliosis Neg Hx     Vascular Disease Neg Hx        The following portions of the patient's history were reviewed and updated as appropriate: allergies, current medications, past family history, past medical history, past social history, past surgical history, and problem list.    Social History     Socioeconomic History    Marital status: /Civil Union     Spouse name: Not on file    Number of children: Not on file    Years of education: Not on file    Highest education level: Not on file   Occupational History    Not on file   Tobacco Use    Smoking status: Never     Passive exposure: Never    Smokeless tobacco: Never   Vaping Use    Vaping status: Never Used   Substance and Sexual Activity    Alcohol use: Not Currently    Drug use: No    Sexual activity: Not Currently     Partners: Male   Other Topics Concern    Not on file   Social History Narrative    Not on file     Social Determinants of Health     Financial Resource Strain: Not on file   Food Insecurity: No Food Insecurity (6/18/2024)    Hunger Vital Sign     Worried About Running Out of Food in the Last Year: Never true     Ran Out of Food in the Last Year: Never true   Transportation Needs: No Transportation Needs (6/18/2024)    PRAPARE - Transportation     Lack of Transportation (Medical): No     Lack of Transportation (Non-Medical): No   Physical Activity: Not on file   Stress: Not on file   Social  Connections: Not on file   Intimate Partner Violence: Not on file   Housing Stability: Low Risk  (6/18/2024)    Housing Stability Vital Sign     Unable to Pay for Housing in the Last Year: No     Number of Times Moved in the Last Year: 0     Homeless in the Last Year: No     Social History     Social History Narrative    Not on file       Objective:       Mental status:  Appearance calm and cooperative , adequate hygiene and grooming, and good eye contact    Mood anxious   Affect affect appropriate    Speech a normal rate   Thought Processes normal thought processes   Hallucinations no hallucinations present    Thought Content no delusions   Abnormal Thoughts no suicidal thoughts  and no homicidal thoughts    Orientation  oriented to person   Remote Memory short term memory intact and long term memory intact   Attention Span concentration intact   Intellect Appears to be of Average Intelligence   Insight Insight intact   Judgement judgment was intact   Muscle Strength Muscle strength and tone were normal   Language no difficulty naming common objects   Fund of Knowledge displays adequate knowledge of current events   Pain moderate to severe   Pain Scale 5       Assessment/Plan:       Diagnoses and all orders for this visit:    Severe episode of recurrent major depressive disorder, without psychotic features (HCC)    PTSD (post-traumatic stress disorder)    Marital conflict    Major depressive disorder, recurrent, in partial remission (HCC)    Insomnia related to another mental disorder    Generalized anxiety disorder    Gambling disorder, moderate    Alcohol dependence in remission (HCC)            Treatment Recommendations- Risks Benefits      Immediate Medical/Psychiatric/Psychotherapy Treatments and Any Precautions: Continue with treatment plan    Risks, Benefits And Possible Side Effects Of Medications:  {PSYCH RISK, BENEFITS AND POSSIBLE SIDE EFFECTS (Optional):96048       Psychotherapy Provided: 30 minutes  "individual psychotherapy provided.   Supportive therapy  Medication evaluation  Mood assessment  Surveys reviewed and confirmed  Normalized and validated her feelings  Updated treatment plan    Goals discussed in session: Maintain stable mood    \"Portions of the record may have been created with voice recognition software. Occasional wrong word or \"sound a like\" substitutions may have occurred due to the inherent limitations of voice recognition software. Read the chart carefully and recognize, using context, where substitutions have occurred. Please call if you have any questions. \"                                   "

## 2024-08-23 NOTE — BH TREATMENT PLAN
TREATMENT PLAN (Medication Management Only)         [facility]            [facility]     Name and Date of Birth:  Ina Tolbert 68 y.o. 1955     Date of Treatment Plan: October 18, 2022, March 28, 2023 October 3, 2023, February 27, 2024 August 15, 2024     Diagnosis/Diagnoses:    1. Major depressive disorder, recurrent, in partial remission (HCC)    2. Generalized anxiety disorder    3. Insomnia related to another mental disorder    4. Marital conflict          Strengths/Personal Resources for Self-Care: financial security, ability to express needs, motivation for treatment     Area/Areas of need (in own words): depression, gambling addiction, emotional insecurity  1.         Long Term Goal: continue to improve control of depression, anxiety, ultimate goal no hospitalization  Target date: 2/15/2025  Person/Persons responsible for completion of goal: sarah Buckley     2.         Short Term Objective (s) - How will we reach this goal?:   A. Provider new recommended medication/dosage changes and/or continue medication(s):  Cymbalta, Seroquel  B. take medications as prescribed, attend scheduled appointments  C. See Aury Ramos LCSW ([facility]) for psychotherapy   Target date: 2/15/2025   person/Persons Responsible for Completion of Goal: sarah Buckley      Progress Towards Goals: progressing     Treatment Modality: medication management every 12 weeks     Review due 180 days from date of this plan: 2/15/2025   expected length of service: ongoing treatment  My Physician/PA/NP and I have developed this plan together and I agree to work on the goals and objectives. I understand the treatment goals that were developed for my treatment.

## 2024-08-28 LAB
ALDOLASE SERPL-CCNC: 5.9 U/L (ref 3.3–10.3)
CK SERPL-CCNC: 80 U/L (ref 32–182)
EJ AB SER QL: NEGATIVE
ENA JO1 AB SER IA-ACNC: <20 UNITS
ENA PM/SCL AB SER-ACNC: <20 UNITS
ENA RNP AB SER-ACNC: <0.2 AI (ref 0–0.9)
ENA SM AB SER-ACNC: <0.2 AI (ref 0–0.9)
ENA SS-A 52KD IGG SER IA-ACNC: <20 UNITS
ENA SS-A AB SER-ACNC: <0.2 AI (ref 0–0.9)
ENA SS-B AB SER-ACNC: <0.2 AI (ref 0–0.9)
FIBRILLARIN AB SER QL: NEGATIVE
KU AB SER QL: NEGATIVE
MDA5 AB SER LINE BLOT-ACNC: <20 UNITS
MI2 AB SER QL: NEGATIVE
MJ AB SER LINE BLOT-ACNC: <20 UNITS
OJ AB SER QL: NEGATIVE
PL12 AB SER QL: NEGATIVE
PL7 AB SER QL: NEGATIVE
SAE1 IGG SER QL LINE BLOT: <20 UNITS
SRP AB SERPL QL: NEGATIVE
TIF1-GAMMA AB SER LINE BLOT-ACNC: <20 UNITS
U1 SNRNP AB SER IA-ACNC: <20 UNITS
U2 SNRNP AB SER QL: NEGATIVE

## 2024-09-12 ENCOUNTER — OFFICE VISIT (OUTPATIENT)
Dept: GASTROENTEROLOGY | Facility: CLINIC | Age: 69
End: 2024-09-12
Payer: COMMERCIAL

## 2024-09-12 VITALS
DIASTOLIC BLOOD PRESSURE: 76 MMHG | TEMPERATURE: 98.8 F | SYSTOLIC BLOOD PRESSURE: 120 MMHG | WEIGHT: 162 LBS | HEIGHT: 65 IN | BODY MASS INDEX: 26.99 KG/M2

## 2024-09-12 DIAGNOSIS — K21.9 GASTROESOPHAGEAL REFLUX DISEASE, UNSPECIFIED WHETHER ESOPHAGITIS PRESENT: Primary | ICD-10-CM

## 2024-09-12 DIAGNOSIS — K58.0 IRRITABLE BOWEL SYNDROME WITH DIARRHEA: ICD-10-CM

## 2024-09-12 PROCEDURE — 99214 OFFICE O/P EST MOD 30 MIN: CPT | Performed by: INTERNAL MEDICINE

## 2024-09-12 NOTE — PROGRESS NOTES
[facility] - Outpatient Follow-up Note  Ina Tolbert 68 y.o. female MRN: [id number]  Encounter: [id number]          ASSESSMENT AND PLAN:      1. Gastroesophageal reflux disease, unspecified whether esophagitis present  Patient has history of GERD. At last visit, prescribed Pepcid in addition to home esomeprazole. Symptoms well controlled at this time.    Plan:  -Continue Pepcid and esomeprazole     2. Irritable bowel syndrome with diarrhea  She was last seen in April 2024 after a hospital encounter with bloody diarrhea. At that time she was found positive for Campylobacter enteritis. Colonoscopy was significant for TI ulceration and acute ileitis. She was started on Remicade but had complications of elevated LFTs and side effects and then was started on mesalamine at that time. During last visit patient was taken off of Remicade and mesalamine with low suspicion for Crohn's disease. It was suspected that patient was having a post infectious exacerbation of her IBS given the bacterial infection with Campylobacter. Since last visit patient has been feeling well. Before patient said it was typical for her to have a lot of constipation for 7+ days without a bowel movement. Currently she is having up to 1 to 2 bowel movements per day. No longer travelling for work which has eased her IBS anxieties.  Of note that she had some significant gas pains this morning as well as an increased volume of stool. The gas pains have somewhat improved but she still experiencing discomfort.    Plan:  -Will continue monitoring abdominal pain  -Can try Gas-X for symptom control  -ED or return precautions discussed  -Follow-up in 6 months     ______________________________________________________________________    SUBJECTIVE:    Ms. Ina Tolbert is a 68-year-old female with PMHx of long-standing, mixed IBS, and GERD who presents today for follow-up. She was last seen in April 2024 after a hospital encounter  with bloody diarrhea. At that time she was found positive for Campylobacter enteritis. Colonoscopy was significant for TI ulceration and acute ileitis. She was started on Remicade but had complications of elevated LFTs and side effects and then was started on mesalamine at that time. During last visit patient was taken off of Remicade and mesalamine with low suspicion for Crohn's disease. It was suspected that patient was having a post infectious exacerbation of her IBS given the bacterial infection with Campylobacter. Biopsies at the time were not suggestive of chronicity and were more acute in nature. Patient was advised to take Imodium as needed because she traveled a lot for work. And she was started on Pepcid for her GERD in addition to her daily esomeprazole.     Since last visit patient has been feeling well. Before patient said it was typical for her to have a lot of constipation for 7+ days without a bowel movement. Currently she is having up to 1 to 2 bowel movements per day. They are somewhat loose but not watery. Denies bloody or tarry stools. Before had a lot of constipation for 7 days , has improved since April. She also no longer travels for work and now has access to a bathroom while working which is the sum of her anxieties. Of note that she had some significant gas pains this morning as well as an increased volume of stool. The gas pains have somewhat improved but she still experiencing discomfort. Her GERD symptoms seem to be under control. Otherwise, no additional complaints or concerns at this time.    REVIEW OF SYSTEMS IS OTHERWISE NEGATIVE.      Historical Information   Past Medical History:   Diagnosis Date    Abdominal adhesions     Alcoholism ([facility]) 1996    in remission    Anesthesia complication     difficult to wake up    Anxiety     Arthritis     Asthma     with exertion    Cancer (HCC)     melanoma    Colon polyp     Crohn's disease (HCC)     Depression     Depression     Disease of  thyroid gland     Fibromyalgia     Fibromyalgia, primary     Fractures 2006    GERD (gastroesophageal reflux disease)     History of cholecystectomy 09/07/2019    Hyperlipidemia     Infected tooth 01/2024    Tooth #14-was treated with antibiotics-endodontal appt 2/1 may need root canal    Irregular heart beat     can be tachy; also has orthoststic hypotension    Irritable bowel syndrome 1990    Lazy eye     resolved: 3/27/17    Medical clearance for psychiatric admission 07/13/2021    Melanoma (HCC) 2010    Mild asthma 11/04/2020    Osteoarthritis 07/2018    Osteopenia     with joint pain-elevated DEV    Polymyalgia (HCC)     Psychiatric disorder     Shortness of breath     assoc with tachycardia    Stomach disorder 1995    Tinnitus     Wears glasses     for reading     Past Surgical History:   Procedure Laterality Date    ABDOMINAL SURGERY      lysis of adhesions x 2    APPENDECTOMY      CERVICAL FUSION      May 5,2021 and Jan. 01,2020    CHOLECYSTECTOMY      open    COLONOSCOPY      DILATION AND CURETTAGE OF UTERUS      FOOT SURGERY  05/2017    NECK SURGERY  01/2019 5/2021    NEUROMA EXCISION Right 05/26/2017    Procedure: EXCISION MASS / FIBROMA FOOT;  Surgeon: Jorden Crespo DPM;  Location: [facility] MAIN OR;  Service:     PARS PLANA VITRECTOMY W/ REPAIR OF MACULAR HOLE  12/23/2020    SD ARTHRP KNE CONDYLE&PLATU MEDIAL&LAT COMPARTMENTS Right 2/6/2024    Procedure: ARTHROPLASTY KNEE TOTAL W ROBOT - RIGHT - PRESS FIT - OVERNIGHT;  Surgeon: Jairon Kim DO;  Location: [facility] MAIN OR;  Service: Orthopedics    SD XCAPSL CTRC RMVL INSJ IO LENS PROSTH W/O ECP Left 12/06/2021    Procedure: EXTRACTION EXTRACAPSULAR CATARACT PHACO INTRAOCULAR LENS (IOL);  Surgeon: Mandeep Chavez MD;  Location: [facility] MAIN OR;  Service: Ophthalmology    RIGHT OOPHORECTOMY      UPPER GASTROINTESTINAL ENDOSCOPY  5/2019    WISDOM TOOTH EXTRACTION      x4     Social History   Social History     Substance and Sexual Activity   Alcohol Use Not Currently      Social History     Substance and Sexual Activity   Drug Use No     Social History     Tobacco Use   Smoking Status Never    Passive exposure: Never   Smokeless Tobacco Never     Family History   Problem Relation Age of Onset    Heart disease Mother     Stroke Mother 62    COPD Mother     Arthritis Mother     Asthma Mother     Hypertension Father     Dislocations Sister     Multiple sclerosis Sister     Neurological problems Sister     Scoliosis Sister     Depression Sister     Mental illness Sister     No Known Problems Maternal Grandmother     No Known Problems Maternal Grandfather     No Known Problems Paternal Grandmother     No Known Problems Paternal Grandfather     Kidney disease Brother         kidney transplant    No Known Problems Maternal Aunt     No Known Problems Maternal Uncle     No Known Problems Paternal Aunt     No Known Problems Paternal Uncle     ADD / ADHD Neg Hx     Anesthesia problems Neg Hx     Cancer Neg Hx     Clotting disorder Neg Hx     Collagen disease Neg Hx     Diabetes Neg Hx     Dislocations Neg Hx     Learning disabilities Neg Hx     Neurological problems Neg Hx     Osteoporosis Neg Hx     Rheumatologic disease Neg Hx     Scoliosis Neg Hx     Vascular Disease Neg Hx        Meds/Allergies       Current Outpatient Medications:     albuterol (ProAir HFA) 90 mcg/act inhaler    Calcium Carb-Cholecalciferol 600-12.5 MG-MCG CAPS    Denta 5000 Plus 1.1 % CREA    docusate sodium (COLACE) 100 mg capsule    DULoxetine (CYMBALTA) 60 mg delayed release capsule    esomeprazole (NexIUM) 40 MG capsule    famotidine (PEPCID) 20 mg tablet    Fluticasone-Salmeterol (Wixela Inhub) 100-50 mcg/dose inhaler    gabapentin (NEURONTIN) 300 mg capsule    levothyroxine 50 mcg tablet    midodrine (PROAMATINE) 5 mg tablet    QUEtiapine (SEROquel) 100 mg tablet    Allergies   Allergen Reactions    Meperidine Lightheadedness and Other (See Comments)    Sulfa Antibiotics Hives           Objective     Blood  "pressure 120/76, temperature 98.8 °F (37.1 °C), temperature source Tympanic, height 5' 5\" (1.651 m), weight 73.5 kg (162 lb). Body mass index is 26.96 kg/m².      PHYSICAL EXAM:      General Appearance:   Alert, cooperative, no distress   HEENT:   Normocephalic, atraumatic, anicteric.     Neck:  Supple, symmetrical, trachea midline   Lungs:   Clear to auscultation bilaterally; no rales, rhonchi or wheezing; respirations unlabored    Heart::   Regular rate and rhythm; no murmur, rub, or gallop.   Abdomen:   Soft, non-distended; normal bowel sounds; no masses, no organomegaly, tenderness noted in LLQ   Genitalia:   Deferred    Rectal:   Deferred    Extremities:  No cyanosis, clubbing or edema    Pulses:  2+ and symmetric    Skin:  No jaundice, rashes, or lesions    Lymph nodes:  No palpable cervical lymphadenopathy        Lab Results:   No visits with results within 1 Day(s) from this visit.   Latest known visit with results is:   Clinical Support on 2024   Component Date Value    TB Skin Test 08/15/2024 Negative     Induration 08/15/2024 0          Radiology Results:   Methacholine challenge    Result Date: 2024  Narrative: Pulmonary Function Test With Methacholine Challenge Testing Report Ina Tolbert 68 y.o. female MRN: [id number] Date of Testin24 Date of Interpretation: 2024 Requesting Physician: Yamil Reason for Testing: Asthma Procedure: The patient was taken to pulmonary function testing laboratory.  The patient demonstrated good effort and cooperation.  The results of this test meet ATS standards for acceptability and repeatability.  Data set appears appropriate for interpretation. Post bronchodilator testing performed after the administration of 2.5mg albuterol in 3cc normal saline administered via nebulizer per bronchodilator protocol after final dose of methacholine. Methacholine challenge testing performed with the sequential administration of escalating dosing of methacholine " per ATS standards. Results: FEV1/FVC Ratio: 84 % Forced Vital Capacity: 1.98 L 71 % predicted FEV1: 1.66 L 75 % predicted After administration of bronchodilator FEV1 following final dose of mechacholine: 1.58L Maximal reduction in FEV1 was 24% at 16 mg/ml dose Interpretation: No obstruction FEV1 returned to normal after bronchodilator Normal flow-volume loop Borderline degree of airway reactivity to the administration of methacholine. Error correction - Final dose of methacholine was no 25mg/ml but 16mg/ml. Correction has been uploaded into media. Edward Lobo MD Pulmonary, Critical Care     Rachel Zamora MD, MPH  [facility] Internal Medicine Residency PGY-III

## 2024-10-13 DIAGNOSIS — F33.41 MAJOR DEPRESSIVE DISORDER, RECURRENT, IN PARTIAL REMISSION (HCC): ICD-10-CM

## 2024-10-14 RX ORDER — DULOXETIN HYDROCHLORIDE 60 MG/1
60 CAPSULE, DELAYED RELEASE ORAL DAILY
Qty: 30 CAPSULE | Refills: 0 | Status: SHIPPED | OUTPATIENT
Start: 2024-10-14

## 2024-10-14 RX ORDER — QUETIAPINE FUMARATE 100 MG/1
100 TABLET, FILM COATED ORAL
Qty: 90 TABLET | Refills: 1 | Status: SHIPPED | OUTPATIENT
Start: 2024-10-14

## 2024-10-18 ENCOUNTER — OFFICE VISIT (OUTPATIENT)
Dept: URGENT CARE | Facility: CLINIC | Age: 69
End: 2024-10-18
Payer: COMMERCIAL

## 2024-10-18 VITALS
HEART RATE: 110 BPM | DIASTOLIC BLOOD PRESSURE: 60 MMHG | OXYGEN SATURATION: 95 % | BODY MASS INDEX: 26.63 KG/M2 | SYSTOLIC BLOOD PRESSURE: 98 MMHG | TEMPERATURE: 97.6 F | RESPIRATION RATE: 20 BRPM | WEIGHT: 160 LBS

## 2024-10-18 DIAGNOSIS — J18.9 COMMUNITY ACQUIRED PNEUMONIA OF LEFT LOWER LOBE OF LUNG: Primary | ICD-10-CM

## 2024-10-18 PROCEDURE — 99213 OFFICE O/P EST LOW 20 MIN: CPT | Performed by: PREVENTIVE MEDICINE

## 2024-10-18 RX ORDER — AZITHROMYCIN 250 MG/1
TABLET, FILM COATED ORAL
Qty: 6 TABLET | Refills: 0 | Status: SHIPPED | OUTPATIENT
Start: 2024-10-18 | End: 2024-10-22

## 2024-10-18 RX ORDER — AMOXICILLIN 500 MG/1
CAPSULE ORAL
Qty: 42 CAPSULE | Refills: 0 | Status: SHIPPED | OUTPATIENT
Start: 2024-10-18 | End: 2024-10-25

## 2024-10-18 NOTE — PROGRESS NOTES
Ambulatory Visit  Name: Ina Tolbert      : 1955      MRN: [id number]  Encounter Provider: Olga Chapa DO  Encounter Date: 10/21/2024   Encounter department: [facility] [place]    Assessment & Plan  Age-related osteoporosis without current pathological fracture  Patient is a 69-year-old female presenting for follow-up for osteoporosis.  Patient meets criteria based on T-score of the lumbar spine being -2.5.  Will complete secondary workup today.  Discussed various treatment options.  Given history of GERD, will avoid p.o. bisphosphonates.  Discussed Reclast versus Prolia.  Patient agreeable to starting Reclast infusions.  -Plan for IV Reclast 5 mg yearly for 3 doses, pending prior authorization  -Continue calcium and vitamin D supplements.  Discussed 1200 mg of calcium a day, preferentially through the diet.  Will update vitamin D level.  -Discussed weightbearing exercises  -Repeat DEXA around 2026    Orders:    Vitamin D 25 hydroxy; Future    Basic metabolic panel; Future    Phosphorus; Future    Alkaline phosphatase; Future    Protein electrophoresis, serum; Future    Protein electrophoresis, urine; Future    TSH, 3rd generation with Free T4 reflex; Future    Vitamin D 25 hydroxy    Basic metabolic panel    Phosphorus    Alkaline phosphatase    Protein electrophoresis, serum    Protein electrophoresis, urine    TSH, 3rd generation with Free T4 reflex    RTC in 1 year     History of Present Illness     Ina Tolbert is a 69 y.o. female with a history of hypertension, migraines, asthma, GERD, IBS, hypothyroidism, anxiety and depression who presents follow-up of positive DEV and osteoporosis.    History obtained from : patient    Patient denies previous history of osteoporosis.  Patient states that she takes calcium and vitamin D supplements.  Patient has a history of a wrist fracture 10 years ago after falling on ice and an ankle fracture 15 years ago after stepping off her  cintia.  No family history of hip fractures.  Patient denies tobacco or alcohol use.  Denies chronic steroid use.  Patient has a history of reflux on PPI.  Denies history of gastric bypass, renal stones, radiation.  Patient went through menopause at age 49 naturally.  Patient has had right unilateral oophorectomy.      Rheumatic history:  Up until November 2023, the patient was following with Dr. Neha Wright of [facility] rheumatology in terms of her history of fibromyalgia and +DEV. Prior to this, she was seeing Dr. Dasilva.  At the time of her last visit with Dr. Wright, it was discussed that she was recently diagnosed with Crohn's disease around the time medication.  Previous workup revealed DEV at a titer of 1:80 with negative subsets.  Since then, she has discontinued Remicade due to hair loss and rashes.     During pulmonology evaluation in early 2024 for mild mosaic attenuation noted on CT chest, it was discussed that the patient had positive DEV and ANCA panel.  Upon review, the DEV returned at a titer of 1:160 with negative ANCA panel and CRP of 2.  Due to the fact that DEV was previously negative in November 2023 (and has fluctuated between 1:80 and 1:160 in the past) and serologic workup has been completely unremarkable thus far, though recent DEV is likely a false positive.  She was not having any signs or symptoms concerning for AI disease.  Repeat serologic workup was all negative.    8/13/2024: DEXA scan concerning for osteoporosis based on T-score of -2.5 at the lumbar spine.    Review of Systems  Denies:  Fever  Rash  Oral/nasal/genital ulcers  Dry eyes, dry mouth  Vision changes  Dysphagia/odynophagia  CP  BLACK  SOB at rest  Pleurisy  Stomach pain  Constipation/bloating  Hematochezia  Gross hematuria  Numbness/tingling  Muscle weakness   Dactylitis  Raynaud's  Joint issues other than noted above    Past Medical History:   Diagnosis Date    Abdominal adhesions     Alcoholism (HCC) 1996    in remission     Anesthesia complication     difficult to wake up    Anxiety     Arthritis     Asthma     with exertion    Cancer (HCC)     melanoma    Colon polyp     Crohn's disease (HCC)     Depression     Depression     Disease of thyroid gland     Fibromyalgia     Fibromyalgia, primary     Fractures 2006    GERD (gastroesophageal reflux disease)     History of cholecystectomy 09/07/2019    Hyperlipidemia     Infected tooth 01/2024    Tooth #14-was treated with antibiotics-endodontal appt 2/1 may need root canal    Irregular heart beat     can be tachy; also has orthoststic hypotension    Irritable bowel syndrome 1990    Lazy eye     resolved: 3/27/17    Medical clearance for psychiatric admission 07/13/2021    Melanoma (HCC) 2010    Mild asthma 11/04/2020    Osteoarthritis 07/2018    Osteopenia     with joint pain-elevated DEV    Polymyalgia ([facility])     Psychiatric disorder     Shortness of breath     assoc with tachycardia    Stomach disorder 1995    Tinnitus     Wears glasses     for reading       Current Outpatient Medications on File Prior to Visit   Medication Sig Dispense Refill    albuterol (ProAir HFA) 90 mcg/act inhaler Inhale 2 puffs every 6 (six) hours as needed for wheezing 8.5 g 6    Calcium Carb-Cholecalciferol 600-12.5 MG-MCG CAPS Take by mouth daily in the early morning      Denta 5000 Plus 1.1 % CREA USE ONCE DAILY AT NIGHT      docusate sodium (COLACE) 100 mg capsule Take 1 capsule (100 mg total) by mouth 2 (two) times a day 60 capsule 0    DULoxetine (CYMBALTA) 60 mg delayed release capsule TAKE 1 CAPSULE BY MOUTH EVERY DAY 30 capsule 0    esomeprazole (NexIUM) 40 MG capsule TAKE 1 CAPSULE BY MOUTH EVERY DAY BEFORE BREAKFAST 90 capsule 1    famotidine (PEPCID) 20 mg tablet Take 1 tablet (20 mg total) by mouth daily as needed for heartburn 90 tablet 3    Fluticasone-Salmeterol (Wixela Inhub) 100-50 mcg/dose inhaler Inhale 1 puff 2 (two) times a day Rinse mouth after use. 60 blister 3    gabapentin (NEURONTIN)  300 mg capsule Take 1 capsule (300 mg total) by mouth 3 (three) times a day 270 capsule 3    levothyroxine 50 mcg tablet TAKE 1 TABLET BY MOUTH EVERY DAY 90 tablet 1    midodrine (PROAMATINE) 5 mg tablet Take 5 mg by mouth daily      QUEtiapine (SEROquel) 100 mg tablet TAKE 1 TABLET BY MOUTH DAILY AT BEDTIME 90 tablet 1     No current facility-administered medications on file prior to visit.      Social History     Tobacco Use    Smoking status: Never     Passive exposure: Never    Smokeless tobacco: Never   Vaping Use    Vaping status: Never Used   Substance and Sexual Activity    Alcohol use: Not Currently    Drug use: No    Sexual activity: Not Currently     Partners: Male         Objective     LMP  (LMP Unknown)     Physical Exam  General appearance: normal appearing, no acute distress  Skin: normal, no rashes  HEENT: normal, moist oropharynx, no nasal or oral ulcers  Lymph nodes: no palpable adenopathy  Lungs: normal respiratory effort, comfortable on room air, lungs clear to auscultation b/l   Heart: normal heart sounds, normal rate, normal rhythm,  Abdomen: soft, normal bowel sounds, no tenderness  Neurologic: no obvious neurological deficits   Extremities: no edema, warm and well perfused     Musculoskeletal Exam:   - Observation: no obvious joint abnormalities    - Palpation: no joint tenderness  - Synovitis: absent  - Joint effusions: absent  - ROM: intact throughout  - Muscle Strength: 5/5 throughout       I have independently reviewed the following labs/images and interpret them as follows.    DEV screen negative  RNP negative  Lora negative  PM SCL antibody negative  SSA negative  SSB negative  Kanwal 1 antibody negative  ANCA panel negative  dsDNA negative  Anti-CCP negative    DEXA 8/13/24  LUMBAR SPINE  Level: L2-L4  (L1 vertebra excluded from analysis due to local structural abnormalities or artifact):  BMD: 0.906 gm/cm2  T-score: -2.5     LEFT TOTAL HIP:  BMD: 0.798 gm/cm2  T-score: -1.7     LEFT  FEMORAL NECK:  BMD: 0.883 gm/cm2  T score: -1.1     RIGHT TOTAL HIP:  BMD: 0.713 gm/cm2  T-score: -2.3     RIGHT FEMORAL NECK:  BMD: 0.806 gm/cm2  T score: -1.7      Olga Chapa DO, CCD, [facility] Rheumatology [facility]

## 2024-10-18 NOTE — PATIENT INSTRUCTIONS
I want you rechecked in 5 days to make sure you are healing.  No work until cough subsides energy returns and there is no fever

## 2024-10-18 NOTE — PROGRESS NOTES
[facility]        NAME: Ina Tolbert is a 69 y.o. female  : 1955    MRN: [id number]  DATE: 2024  TIME: 1:49 PM    Assessment and Plan   Community acquired pneumonia of left lower lobe of lung [J18.9]  1. Community acquired pneumonia of left lower lobe of lung              Patient Instructions       Follow up with PCP in 3-5 days.  Proceed to  ER if symptoms worsen.    If tests have been performed at [facility], our office will contact you with results if changes need to be made to the care plan discussed with you at the visit.  You can review your full results on [facility].    Chief Complaint     Chief Complaint   Patient presents with    Flu Symptoms     Pt c/o cough, body aches, fever, chills that started 2 days ago         History of Present Illness       Cough congestion fatigue x 3 days.  She works in the school district    Flu Symptoms  Associated symptoms include congestion and coughing.       Review of Systems   Review of Systems   HENT:  Positive for congestion.    Respiratory:  Positive for cough.          Current Medications       Current Outpatient Medications:     albuterol (ProAir HFA) 90 mcg/act inhaler, Inhale 2 puffs every 6 (six) hours as needed for wheezing, Disp: 8.5 g, Rfl: 6    Calcium Carb-Cholecalciferol 600-12.5 MG-MCG CAPS, Take by mouth daily in the early morning, Disp: , Rfl:     DULoxetine (CYMBALTA) 60 mg delayed release capsule, TAKE 1 CAPSULE BY MOUTH EVERY DAY, Disp: 30 capsule, Rfl: 0    esomeprazole (NexIUM) 40 MG capsule, TAKE 1 CAPSULE BY MOUTH EVERY DAY BEFORE BREAKFAST, Disp: 90 capsule, Rfl: 1    famotidine (PEPCID) 20 mg tablet, Take 1 tablet (20 mg total) by mouth daily as needed for heartburn, Disp: 90 tablet, Rfl: 3    gabapentin (NEURONTIN) 300 mg capsule, Take 1 capsule (300 mg total) by mouth 3 (three) times a day, Disp: 270 capsule, Rfl: 3    levothyroxine 50 mcg tablet, TAKE 1 TABLET BY MOUTH EVERY DAY, Disp: 90 tablet, Rfl: 1     midodrine (PROAMATINE) 5 mg tablet, Take 5 mg by mouth daily, Disp: , Rfl:     QUEtiapine (SEROquel) 100 mg tablet, TAKE 1 TABLET BY MOUTH DAILY AT BEDTIME, Disp: 90 tablet, Rfl: 1    Denta 5000 Plus 1.1 % CREA, USE ONCE DAILY AT NIGHT, Disp: , Rfl:     docusate sodium (COLACE) 100 mg capsule, Take 1 capsule (100 mg total) by mouth 2 (two) times a day, Disp: 60 capsule, Rfl: 0    Fluticasone-Salmeterol (Wixela Inhub) 100-50 mcg/dose inhaler, Inhale 1 puff 2 (two) times a day Rinse mouth after use. (Patient not taking: Reported on 10/18/2024), Disp: 60 blister, Rfl: 3    Current Allergies     Allergies as of 10/18/2024 - Reviewed 10/18/2024   Allergen Reaction Noted    Meperidine Lightheadedness and Other (See Comments) 01/11/2006    Sulfa antibiotics Hives 01/11/2006            The following portions of the patient's history were reviewed and updated as appropriate: allergies, current medications, past family history, past medical history, past social history, past surgical history and problem list.     Past Medical History:   Diagnosis Date    Abdominal adhesions     Alcoholism (HCC) 1996    in remission    Anesthesia complication     difficult to wake up    Anxiety     Arthritis     Asthma     with exertion    Cancer (HCC)     melanoma    Colon polyp     Crohn's disease (HCC)     Depression     Depression     Disease of thyroid gland     Fibromyalgia     Fibromyalgia, primary     Fractures 2006    GERD (gastroesophageal reflux disease)     History of cholecystectomy 09/07/2019    Hyperlipidemia     Infected tooth 01/2024    Tooth #14-was treated with antibiotics-endodontal appt 2/1 may need root canal    Irregular heart beat     can be tachy; also has orthoststic hypotension    Irritable bowel syndrome 1990    Lazy eye     resolved: 3/27/17    Medical clearance for psychiatric admission 07/13/2021    Melanoma (HCC) 2010    Mild asthma 11/04/2020    Osteoarthritis 07/2018    Osteopenia     with joint pain-elevated  DEV    Polymyalgia (HCC)     Psychiatric disorder     Shortness of breath     assoc with tachycardia    Stomach disorder 1995    Tinnitus     Wears glasses     for reading       Past Surgical History:   Procedure Laterality Date    ABDOMINAL SURGERY      lysis of adhesions x 2    APPENDECTOMY      CERVICAL FUSION      May 5,2021 and Jan. 01,2020    CHOLECYSTECTOMY      open    COLONOSCOPY      DILATION AND CURETTAGE OF UTERUS      FOOT SURGERY  05/2017    NECK SURGERY  01/2019 5/2021    NEUROMA EXCISION Right 05/26/2017    Procedure: EXCISION MASS / FIBROMA FOOT;  Surgeon: Jorden Crespo DPM;  Location: [facility];  Service:     PARS PLANA VITRECTOMY W/ REPAIR OF MACULAR HOLE  12/23/2020    IL ARTHRP KNE CONDYLE&PLATU MEDIAL&LAT COMPARTMENTS Right 2/6/2024    Procedure: ARTHROPLASTY KNEE TOTAL W ROBOT - RIGHT - PRESS FIT - OVERNIGHT;  Surgeon: Jairon Kim DO;  Location: [facility];  Service: Orthopedics    IL XCAPSL CTRC RMVL INSJ IO LENS PROSTH W/O ECP Left 12/06/2021    Procedure: EXTRACTION EXTRACAPSULAR CATARACT PHACO INTRAOCULAR LENS (IOL);  Surgeon: Mandeep Chavez MD;  Location: [facility] MAIN OR;  Service: Ophthalmology    RIGHT OOPHORECTOMY      UPPER GASTROINTESTINAL ENDOSCOPY  5/2019    WISDOM TOOTH EXTRACTION      x4       Family History   Problem Relation Age of Onset    Heart disease Mother     Stroke Mother 62    COPD Mother     Arthritis Mother     Asthma Mother     Hypertension Father     Dislocations Sister     Multiple sclerosis Sister     Neurological problems Sister     Scoliosis Sister     Depression Sister     Mental illness Sister     No Known Problems Maternal Grandmother     No Known Problems Maternal Grandfather     No Known Problems Paternal Grandmother     No Known Problems Paternal Grandfather     Kidney disease Brother         kidney transplant    No Known Problems Maternal Aunt     No Known Problems Maternal Uncle     No Known Problems Paternal Aunt     No Known Problems Paternal  Uncle     ADD / ADHD Neg Hx     Anesthesia problems Neg Hx     Cancer Neg Hx     Clotting disorder Neg Hx     Collagen disease Neg Hx     Diabetes Neg Hx     Dislocations Neg Hx     Learning disabilities Neg Hx     Neurological problems Neg Hx     Osteoporosis Neg Hx     Rheumatologic disease Neg Hx     Scoliosis Neg Hx     Vascular Disease Neg Hx          Medications have been verified.        Objective   BP 98/60   Pulse (!) 110   Temp 97.6 °F (36.4 °C) (Oral)   Resp 20   Wt 72.6 kg (160 lb)   LMP  (LMP Unknown)   SpO2 95%   BMI 26.63 kg/m²   No LMP recorded (lmp unknown). Patient is postmenopausal.       Physical Exam     Physical Exam  Pulmonary:      Comments: Rales left base.  Scattered wheezes left lower lobe right lower lobe some rhonchi right lower lobe

## 2024-10-21 ENCOUNTER — TELEPHONE (OUTPATIENT)
Dept: RHEUMATOLOGY | Facility: CLINIC | Age: 69
End: 2024-10-21

## 2024-10-21 ENCOUNTER — OFFICE VISIT (OUTPATIENT)
Dept: RHEUMATOLOGY | Facility: CLINIC | Age: 69
End: 2024-10-21
Payer: COMMERCIAL

## 2024-10-21 VITALS
SYSTOLIC BLOOD PRESSURE: 110 MMHG | DIASTOLIC BLOOD PRESSURE: 60 MMHG | BODY MASS INDEX: 26.82 KG/M2 | WEIGHT: 161 LBS | HEIGHT: 65 IN | HEART RATE: 84 BPM | OXYGEN SATURATION: 100 %

## 2024-10-21 DIAGNOSIS — M81.0 AGE-RELATED OSTEOPOROSIS WITHOUT CURRENT PATHOLOGICAL FRACTURE: Primary | ICD-10-CM

## 2024-10-21 PROCEDURE — 99214 OFFICE O/P EST MOD 30 MIN: CPT | Performed by: STUDENT IN AN ORGANIZED HEALTH CARE EDUCATION/TRAINING PROGRAM

## 2024-10-21 NOTE — TELEPHONE ENCOUNTER
Rheumatology Pre-Certification Request     Medication/Disease State Information:   Diagnosis: Age-related osteoporosis  Medication: IV Reclast 5 mg yearly x 3 doses  Failed or Intolerant to or Contraindicated: Avoid p.o. bisphosphonates in the setting of GERD on PPI  Site of medication administration (if applicable): [place]  Comments: n/a    Olga Chapa DO, TEMI, TONYA  NPI: [id number]  Lic: [id number]

## 2024-10-21 NOTE — ASSESSMENT & PLAN NOTE
Patient is a 69-year-old female presenting for follow-up for osteoporosis.  Patient meets criteria based on T-score of the lumbar spine being -2.5.  Will complete secondary workup today.  Discussed various treatment options.  Given history of GERD, will avoid p.o. bisphosphonates.  Discussed Reclast versus Prolia.  Patient agreeable to starting Reclast infusions.  -Plan for IV Reclast 5 mg yearly for 3 doses, pending prior authorization  -Continue calcium and vitamin D supplements.  Discussed 1200 mg of calcium a day, preferentially through the diet.  Will update vitamin D level.  -Discussed weightbearing exercises  -Repeat DEXA around 8/2026    Orders:    Vitamin D 25 hydroxy; Future    Basic metabolic panel; Future    Phosphorus; Future    Alkaline phosphatase; Future    Protein electrophoresis, serum; Future    Protein electrophoresis, urine; Future    TSH, 3rd generation with Free T4 reflex; Future    Vitamin D 25 hydroxy    Basic metabolic panel    Phosphorus    Alkaline phosphatase    Protein electrophoresis, serum    Protein electrophoresis, urine    TSH, 3rd generation with Free T4 reflex

## 2024-10-22 NOTE — TELEPHONE ENCOUNTER
Reference # [id number] ([phone]) [id number]    Called [facility] and spoke with Shivani. Benefits as below for .   Co- insurance: 100%   Pre-certification: Required     Will call to receive authorization over the phone.

## 2024-10-23 NOTE — TELEPHONE ENCOUNTER
Called [facility] to obtain authorization. Spoke with Vincent (reference number: [id number])  Stated pt has medicare as primary and prior auth is not needed they follow medicare guidelines.

## 2024-10-23 NOTE — TELEPHONE ENCOUNTER
Called pt to verify insurance information as she does not have medicare card scanned in. LM to return call.

## 2024-10-24 ENCOUNTER — OFFICE VISIT (OUTPATIENT)
Dept: FAMILY MEDICINE CLINIC | Facility: CLINIC | Age: 69
End: 2024-10-24
Payer: COMMERCIAL

## 2024-10-24 VITALS
SYSTOLIC BLOOD PRESSURE: 138 MMHG | BODY MASS INDEX: 27.39 KG/M2 | HEIGHT: 65 IN | DIASTOLIC BLOOD PRESSURE: 70 MMHG | HEART RATE: 70 BPM | TEMPERATURE: 97 F | RESPIRATION RATE: 16 BRPM | WEIGHT: 164.4 LBS

## 2024-10-24 DIAGNOSIS — E03.9 ADULT HYPOTHYROIDISM: ICD-10-CM

## 2024-10-24 DIAGNOSIS — J18.9 PNEUMONIA OF LEFT LOWER LOBE DUE TO INFECTIOUS ORGANISM: Primary | ICD-10-CM

## 2024-10-24 DIAGNOSIS — F33.42 RECURRENT MAJOR DEPRESSIVE DISORDER, IN FULL REMISSION (HCC): ICD-10-CM

## 2024-10-24 PROCEDURE — 99214 OFFICE O/P EST MOD 30 MIN: CPT | Performed by: NURSE PRACTITIONER

## 2024-10-24 RX ORDER — ZOLEDRONIC ACID 0.05 MG/ML
5 INJECTION, SOLUTION INTRAVENOUS ONCE
OUTPATIENT
Start: 2024-10-31

## 2024-10-24 RX ORDER — METHYLPREDNISOLONE 4 MG/1
TABLET ORAL
Qty: 21 TABLET | Refills: 0 | Status: SHIPPED | OUTPATIENT
Start: 2024-10-24

## 2024-10-24 RX ORDER — SODIUM CHLORIDE 9 MG/ML
20 INJECTION, SOLUTION INTRAVENOUS ONCE
OUTPATIENT
Start: 2024-10-31

## 2024-10-24 NOTE — TELEPHONE ENCOUNTER
Called [facility] and spoke with AL (reference number:[id number])   Benefits as below:   No coninsurance  No Deductible   No copay   Prior Authorization not required.

## 2024-10-24 NOTE — PATIENT INSTRUCTIONS
Patient Education     Methylprednisolone (meth il pred NIS oh lone)   Brand Names: US DEPO-Medrol; Medrol; P-Care D40 [DSC]; P-Care D80 [DSC]; SOLU-Medrol   Brand Names: Obed Depo-Medrol; Medrol; Solu-MEDROL; SOLU-Medrol (PF); Uni-Med [DSC]   What is this drug used for?   It is used for many health problems like allergy signs, asthma, adrenal gland problems, blood problems, skin rashes, or swelling problems. This is not a list of all health problems that this drug may be used for. Talk with the doctor.  What do I need to tell my doctor BEFORE I take this drug?   All products:   If you are allergic to this drug; any part of this drug; or any other drugs, foods, or substances. Tell your doctor about the allergy and what signs you had.  If you have any of these health problems: A fungal infection or a malaria infection in the brain.  If you have a herpes infection of the eye.  If you have nerve problems in the eye.  Injection (if given in the muscle):   If you have idiopathic thrombocytopenic purpura (ITP).  This is not a list of all drugs or health problems that interact with this drug.  Tell your doctor and pharmacist about all of your drugs (prescription or OTC, natural products, vitamins) and health problems. You must check to make sure that it is safe for you to take this drug with all of your drugs and health problems. Do not start, stop, or change the dose of any drug without checking with your doctor.  What are some things I need to know or do while I take this drug?   All products:   Tell all of your health care providers that you take this drug. This includes your doctors, nurses, pharmacists, and dentists.  This drug may affect allergy skin tests. Be sure your doctor and lab workers know you take this drug.  You may have more chance of getting an infection. Wash hands often. Stay away from people with infections, colds, or flu.  Call your doctor right away if you have any signs of infection like fever,  chills, flu-like signs, very bad sore throat, ear or sinus pain, cough, more sputum or change in color of sputum, pain with passing urine, mouth sores, or a wound that will not heal.  Chickenpox and measles can be very bad or even deadly in some people taking steroid drugs like this drug. Avoid being near anyone with chickenpox or measles if you have not had these health problems before. If you have been exposed to chickenpox or measles, talk with your doctor.  This drug lowers how much natural steroid your body makes. Tell your doctor if you have fever, infection, surgery, or injury. Your body's normal response to these stresses may be affected. You may need extra doses of steroid.  Long-term use may raise the chance of cataracts or glaucoma. Talk with the doctor.  This drug may cause weak bones (osteoporosis) with long-term use. Talk with your doctor to see if you have a higher chance of weak bones or if you have any questions.  Talk with your doctor before getting any vaccines. Use of some vaccines with this drug may either raise the chance of an infection or make the vaccine not work as well.  If you have high blood sugar (diabetes), you will need to watch your blood sugar closely.  Talk with your doctor before you drink alcohol.  Liver problems have rarely happened with this drug. Sometimes, this has been deadly. Call your doctor right away if you have signs of liver problems like dark urine, tiredness, decreased appetite, upset stomach or stomach pain, light-colored stools, throwing up, or yellow skin or eyes.  Patients with cancer may be at greater risk of getting a bad and sometimes deadly health problem called tumor lysis syndrome (TLS). Talk with the doctor.  If you are 65 or older, use this drug with care. You could have more side effects.  This drug may affect growth in children and teens in some cases. They may need regular growth checks. Talk with the doctor.  Tell your doctor if you are pregnant, plan  on getting pregnant, or are breast-feeding. You will need to talk about the benefits and risks to you and the baby.  Injection:   Very bad health problems have happened when drugs like this one have been given into the spine (epidural). These include paralysis, loss of eyesight, stroke, and sometimes death. It is not known if drugs like this one are safe and effective when given into the spine. These drugs are not approved for this use. Talk with the doctor.  Some products have benzyl alcohol. If possible, avoid products with benzyl alcohol in newborns or infants. Serious side effects can happen in these children with some doses of benzyl alcohol, including if given with other drugs that have benzyl alcohol. Talk with the doctor to see if this product has benzyl alcohol in it.  What are some side effects that I need to call my doctor about right away?   WARNING/CAUTION: Even though it may be rare, some people may have very bad and sometimes deadly side effects when taking a drug. Tell your doctor or get medical help right away if you have any of the following signs or symptoms that may be related to a very bad side effect:  Signs of an allergic reaction, like rash; hives; itching; red, swollen, blistered, or peeling skin with or without fever; wheezing; tightness in the chest or throat; trouble breathing, swallowing, or talking; unusual hoarseness; or swelling of the mouth, face, lips, tongue, or throat.  Signs of high blood sugar like confusion, feeling sleepy, unusual thirst or hunger, passing urine more often, flushing, fast breathing, or breath that smells like fruit.  Signs of Cushing's disease like weight gain in the upper back or belly, moon face, very bad headache, or slow healing.  Signs of a weak adrenal gland like a severe upset stomach or throwing up, severe dizziness or passing out, muscle weakness, feeling very tired, mood changes, decreased appetite, or weight loss.  Signs of low potassium levels like  muscle pain or weakness, muscle cramps, or a heartbeat that does not feel normal.  Signs of a pancreas problem (pancreatitis) like very bad stomach pain, very bad back pain, or very bad upset stomach or throwing up.  Signs of high blood pressure like very bad headache or dizziness, passing out, or change in eyesight.  Shortness of breath, a big weight gain, or swelling in the arms or legs.  Not able to pass urine or change in how much urine is passed.  Skin changes (pimples, stretch marks, slow healing, hair growth).  Chest pain or pressure.  Fast, slow, or abnormal heartbeat.  Period (menstrual) changes.  Bone or joint pain.  Change in eyesight.  Mental, mood, or behavior changes that are new or worse.  Seizures.  A burning, numbness, or tingling feeling that is not normal.  Any unexplained bruising or bleeding.  Severe stomach pain.  Black, tarry, or bloody stools.  Throwing up blood or throw up that looks like coffee grounds.  What are some other side effects of this drug?   All drugs may cause side effects. However, many people have no side effects or only have minor side effects. Call your doctor or get medical help if any of these side effects or any other side effects bother you or do not go away:  Upset stomach or throwing up.  Trouble sleeping.  Restlessness.  Sweating a lot.  Headache.  These are not all of the side effects that may occur. If you have questions about side effects, call your doctor. Call your doctor for medical advice about side effects.  You may report side effects to your national health agency.  You may report side effects to the FDA at 1-943.358.1931. You may also report side effects at https://www.fda.gov/medwatch.  How is this drug best taken?   Use this drug as ordered by your doctor. Read all information given to you. Follow all instructions closely.  Tablets:   Take in the morning if taking once a day.  Take with food.  Keep taking this drug as you have been told by your doctor or  other health care provider, even if you feel well.  Injection:   It is given as a shot.  All products:   Tell your doctor if you have missed a dose or recently stopped this drug and you feel very tired, weak, or shaky, or have a fast heartbeat, confusion, sweating, or dizziness.  If you have been taking this drug for many weeks, talk with your doctor before stopping. You may want to slowly stop this drug.  Have your blood work checked as you have been told by your doctor. You may also need to have your eye pressure and bone density checked if you take this drug for a long time.  You may need to lower how much salt is in your diet and take extra potassium. Talk with your doctor.  What do I do if I miss a dose?   Tablets:   Take a missed dose as soon as you think about it.  If it is close to the time for your next dose, skip the missed dose and go back to your normal time.  Do not take 2 doses at the same time or extra doses.  Injection:   Call your doctor to find out what to do.  How do I store and/or throw out this drug?   Tablets:   Store at room temperature in a dry place. Do not store in a bathroom.  Injection:   If you need to store this drug at home, talk with your doctor, nurse, or pharmacist about how to store it.  All products:   Keep all drugs in a safe place. Keep all drugs out of the reach of children and pets.  Throw away unused or  drugs. Do not flush down a toilet or pour down a drain unless you are told to do so. Check with your pharmacist if you have questions about the best way to throw out drugs. There may be drug take-back programs in your area.  General drug facts   If your symptoms or health problems do not get better or if they become worse, call your doctor.  Do not share your drugs with others and do not take anyone else's drugs.  Some drugs may have another patient information leaflet. If you have any questions about this drug, please talk with your doctor, nurse, pharmacist, or other  health care provider.  Some drugs may have another patient information leaflet. Check with your pharmacist. If you have any questions about this drug, please talk with your doctor, nurse, pharmacist, or other health care provider.  If you think there has been an overdose, call your poison control center or get medical care right away. Be ready to tell or show what was taken, how much, and when it happened.  Consumer Information Use and Disclaimer   This generalized information is a limited summary of diagnosis, treatment, and/or medication information. It is not meant to be comprehensive and should be used as a tool to help the user understand and/or assess potential diagnostic and treatment options. It does NOT include all information about conditions, treatments, medications, side effects, or risks that may apply to a specific patient. It is not intended to be medical advice or a substitute for the medical advice, diagnosis, or treatment of a health care provider based on the health care provider's examination and assessment of a patient's specific and unique circumstances. Patients must speak with a health care provider for complete information about their health, medical questions, and treatment options, including any risks or benefits regarding use of medications. This information does not endorse any treatments or medications as safe, effective, or approved for treating a specific patient. UpToDate, Inc. and its affiliates disclaim any warranty or liability relating to this information or the use thereof. The use of this information is governed by the Terms of Use, available at https://www.woltersPlyfeuwer.com/en/know/clinical-effectiveness-terms.  Last Reviewed Date   2024-01-26  Copyright   © 2024 UpToDate, Inc. and its affiliates and/or licensors. All rights reserved.  Patient Education     Pneumonia in adults   The Basics   Written by the doctors and editors at [employer]   What is pneumonia? -- Pneumonia is an  infection of the lungs that causes coughing, fever, and trouble breathing (figure 1). It is a serious illness, especially in young children, people older than 65, and people with other health problems. Pneumonia can be caused by bacteria, viruses, and other germs.  What are the symptoms of pneumonia? -- Common symptoms include:   Cough   Fever (temperature higher than 100.4°F or 38°C)   Trouble breathing   Pain when taking a deep breath   Fast heartbeat   Shaking chills  When people with pneumonia cough, they often cough up phlegm or mucus.  Should I see a doctor or nurse? -- Yes, if you think that you might have pneumonia, see a doctor or nurse as soon as possible. Pneumonia can be mild. But it can also be very serious, especially if you do not get it treated quickly. See your doctor or nurse right away if:   Your cough keeps getting worse.   You start having trouble breathing when doing everyday tasks or when resting.   You have chest pain when you breathe.   You feel suddenly worse after getting better from a cold or the flu.   You have a weakened immune system, for example because you have an HIV infection, had an organ transplant or stem cell (bone marrow) transplant, or take medicines that suppress the immune system.   You already have a serious lung disease, such as chronic obstructive pulmonary disease or emphysema.   You are 65 years of age or older.  If your doctor or nurse thinks that you might have pneumonia, they will probably take an X-ray of your chest. Taking a chest X-ray is the best way to tell if you have pneumonia.  How is pneumonia treated? -- It depends on the cause:   Pneumonia that is caused by bacteria is treated with antibiotics. These medicines kill the germs that cause pneumonia. Most people can take antibiotic pills at home, but some people need to be treated in the hospital. Take all of your antibiotics, even if you feel better before you finish them.   Pneumonia from some viruses, like  "those that cause the flu or COVID-19, is treated with an \"antiviral\" medicine. For other types of viral pneumonia, there is no specific treatment.  How soon will I feel better? -- You should start to feel better 3 to 5 days after you start taking antibiotics. Most people can go back to their normal routine within a week of starting treatment. Even so, you might feel tired or have a cough for a month or longer after you get treated. Although this cough can take a while to go away, it is usually milder than when you first got sick.  How should I take care of myself until I recover? -- Get lots of rest, and drink lots of fluids.  If you don't need to stay in the hospital, your doctor or nurse might want to see you or talk to you a few days after you begin treatment. This is to make sure that your pneumonia is getting better. They might also want to see you after you get better to make sure that everything is back to normal.  If your symptoms do not improve or get worse after starting treatment, tell your doctor or nurse.  What can I do to keep from getting pneumonia again? -- Wash your hands often with soap and water (figure 2). This will help protect you from germs and help prevent spreading illness.  There is also a vaccine that protects against the most common type of bacterial pneumonia. But the pneumonia vaccine is not recommended for everyone. Ask your doctor if you should have it. You should get the flu and COVID-19 vaccines every year.  If you smoke, quitting will help prevent pneumonia. Quitting smoking will also improve your overall health.  All topics are updated as new evidence becomes available and our peer review process is complete.  This topic retrieved from [employer] on: Feb 26, 2024.  Topic 60730 Version 25.0  Release: 32.2.4 - C32.56  © 2024 UpToDate, Inc. and/or its affiliates. All rights reserved.  figure 1: Pneumonia     Pneumonia is an infection of the lungs. When you have pneumonia, the air sacs " in your lungs become inflamed. They can fill with fluid and cells trying to fight the infection. This can make it hard to breathe.  Graphic 48879 Version 9.0  figure 2: How to wash your hands     Wet your hands with clean water, and apply a small amount of soap. Lather and rub hands together for at least 20 seconds. Clean your wrists, palms, backs of your hands, between your fingers, tips of your fingers, thumbs, and under and around your nails. Rinse well, and dry your hands using a clean towel.  Graphic 695172 Version 7.0  Consumer Information Use and Disclaimer   Disclaimer: This generalized information is a limited summary of diagnosis, treatment, and/or medication information. It is not meant to be comprehensive and should be used as a tool to help the user understand and/or assess potential diagnostic and treatment options. It does NOT include all information about conditions, treatments, medications, side effects, or risks that may apply to a specific patient. It is not intended to be medical advice or a substitute for the medical advice, diagnosis, or treatment of a health care provider based on the health care provider's examination and assessment of a patient's specific and unique circumstances. Patients must speak with a health care provider for complete information about their health, medical questions, and treatment options, including any risks or benefits regarding use of medications. This information does not endorse any treatments or medications as safe, effective, or approved for treating a specific patient. UpToDate, Inc. and its affiliates disclaim any warranty or liability relating to this information or the use thereof.The use of this information is governed by the Terms of Use, available at https://www.wolterskluwer.com/en/know/clinical-effectiveness-terms. 2024© UpToDate, Inc. and its affiliates and/or licensors. All rights reserved.  Copyright   © 2024 UpToDate, Inc. and/or its affiliates.  All rights reserved.

## 2024-10-24 NOTE — PROGRESS NOTES
"Assessment/Plan:    1. Pneumonia of left lower lobe due to infectious organism  -     XR chest pa and lateral; Future; Expected date: 11/18/2024  -     methylPREDNISolone 4 MG tablet therapy pack; Use as directed on package  2. Recurrent major depressive disorder, in full remission (HCC)  Assessment & Plan:  Managed by psychiatrist  3. Adult hypothyroidism  Assessment & Plan:  Complaint with levothyroxine and tolerating it well  Lab Results   Component Value Date    [id number] 1.432 11/15/2023    TSH 2.800 01/31/2022              Future Appointments   Date Time Provider Department Center   10/28/2024 10:45 AM Callie Guidry, PhD PSYCH [facility]   11/11/2024  9:20 AM Edward Lobo MD [facility]   1/17/2025  8:30 AM SHRAVAN Thakur [facility]   2/6/2025  3:45 PM Jairon Kim DO [facility]TH[facility]   3/17/2025  9:30 AM Jana Jin PA-C [facility]   10/20/2025  9:30 AM Olga Chapa DO [facility]           Subjective:      Patient ID: Ina Tolbert is a 69 y.o. female.    Chief Complaint   Patient presents with    Pneumonia     Dx last week by UC    Excessive Sweating     Excessive sweating on the scalp and forehead x 4 months          Vitals:  /70   Pulse 70   Temp (!) 97 °F (36.1 °C)   Resp 16   Ht 5' 5\" (1.651 m)   Wt 74.6 kg (164 lb 6.4 oz)   LMP  (LMP Unknown)   BMI 27.36 kg/m²     Patient was seen in urgent care on 10/18/24 and stated that was diagnosed with pneumonia and was treated with z-pack and amoxicillin. Stated that feeling much better and denies fever, chills and sob. Still having cough  Complaint with her inhaler.      Pneumonia  She complains of cough. There is no shortness of breath or wheezing. Pertinent negatives include no ear pain, fever, headaches, postnasal drip, rhinorrhea, sneezing, sore throat or trouble swallowing.         The following portions of the patient's history were reviewed and updated as " appropriate: allergies, current medications, past family history, past medical history, past social history, past surgical history and problem list.      Review of Systems   Constitutional:  Negative for chills, diaphoresis, fatigue, fever and unexpected weight change.   HENT:  Negative for congestion, dental problem, drooling, ear discharge, ear pain, facial swelling, hearing loss, mouth sores, nosebleeds, postnasal drip, rhinorrhea, sinus pressure, sinus pain, sneezing, sore throat, tinnitus, trouble swallowing and voice change.    Respiratory:  Positive for cough. Negative for chest tightness, shortness of breath and wheezing.    Cardiovascular: Negative.    Gastrointestinal:  Negative for abdominal pain, constipation, diarrhea, nausea and vomiting.   Musculoskeletal: Negative.    Skin: Negative.    Neurological:  Negative for dizziness, light-headedness and headaches.         Objective:    Social History     Tobacco Use   Smoking Status Never    Passive exposure: Never   Smokeless Tobacco Never       Allergies:   Allergies   Allergen Reactions    Meperidine Lightheadedness and Other (See Comments)    Sulfa Antibiotics Hives         Current Outpatient Medications   Medication Sig Dispense Refill    albuterol (ProAir HFA) 90 mcg/act inhaler Inhale 2 puffs every 6 (six) hours as needed for wheezing 8.5 g 6    amoxicillin (AMOXIL) 500 mg capsule 2 tablets 3 times a day 42 capsule 0    Calcium Carb-Cholecalciferol 600-12.5 MG-MCG CAPS Take by mouth daily in the early morning      Denta 5000 Plus 1.1 % CREA USE ONCE DAILY AT NIGHT      docusate sodium (COLACE) 100 mg capsule Take 1 capsule (100 mg total) by mouth 2 (two) times a day 60 capsule 0    DULoxetine (CYMBALTA) 60 mg delayed release capsule TAKE 1 CAPSULE BY MOUTH EVERY DAY 30 capsule 0    esomeprazole (NexIUM) 40 MG capsule TAKE 1 CAPSULE BY MOUTH EVERY DAY BEFORE BREAKFAST 90 capsule 1    famotidine (PEPCID) 20 mg tablet Take 1 tablet (20 mg total) by mouth  daily as needed for heartburn 90 tablet 3    Fluticasone-Salmeterol (Wixela Inhub) 100-50 mcg/dose inhaler Inhale 1 puff 2 (two) times a day Rinse mouth after use. 60 blister 3    gabapentin (NEURONTIN) 300 mg capsule Take 1 capsule (300 mg total) by mouth 3 (three) times a day 270 capsule 3    levothyroxine 50 mcg tablet TAKE 1 TABLET BY MOUTH EVERY DAY 90 tablet 1    methylPREDNISolone 4 MG tablet therapy pack Use as directed on package 21 tablet 0    midodrine (PROAMATINE) 5 mg tablet Take 5 mg by mouth daily      QUEtiapine (SEROquel) 100 mg tablet TAKE 1 TABLET BY MOUTH DAILY AT BEDTIME 90 tablet 1     No current facility-administered medications for this visit.          Physical Exam  Constitutional:       Appearance: Normal appearance.   HENT:      Head: Normocephalic and atraumatic.      Nose: Nose normal.   Eyes:      Conjunctiva/sclera: Conjunctivae normal.   Cardiovascular:      Rate and Rhythm: Normal rate and regular rhythm.      Pulses: Normal pulses.      Heart sounds: Normal heart sounds.   Pulmonary:      Effort: Pulmonary effort is normal.      Breath sounds: Examination of the right-upper field reveals wheezing. Examination of the left-upper field reveals wheezing. Examination of the left-middle field reveals wheezing. Examination of the left-lower field reveals wheezing and rhonchi. Wheezing and rhonchi present.   Skin:     General: Skin is warm and dry.      Findings: No rash.   Neurological:      Mental Status: She is alert and oriented to person, place, and time.   Psychiatric:         Mood and Affect: Mood normal.         Behavior: Behavior normal.         Thought Content: Thought content normal.         Judgment: Judgment normal.                     SHRAVAN Reeder

## 2024-10-24 NOTE — ASSESSMENT & PLAN NOTE
Complaint with levothyroxine and tolerating it well  Lab Results   Component Value Date    [id number] 1.432 11/15/2023    TSH 2.800 01/31/2022

## 2024-10-24 NOTE — TELEPHONE ENCOUNTER
Called Ina and she stated that she does have Medicare Part A only but her husbands insurance [employer] [place] would be primary and covers all. Will call [facility] just to verify again and receive another reference number.

## 2024-10-25 NOTE — TELEPHONE ENCOUNTER
Called pt and LM stating that we are set to schedule. Appt is scheduled for 10/31/2024 at 1:30pm Also relayed to her that bloodwork must be completed before appt. Gave her infusion center direct line if needed to reschedule.

## 2024-10-28 ENCOUNTER — OFFICE VISIT (OUTPATIENT)
Dept: PSYCHIATRY | Facility: CLINIC | Age: 69
End: 2024-10-28
Payer: COMMERCIAL

## 2024-10-28 VITALS
WEIGHT: 163.2 LBS | SYSTOLIC BLOOD PRESSURE: 144 MMHG | BODY MASS INDEX: 27.19 KG/M2 | HEART RATE: 84 BPM | HEIGHT: 65 IN | DIASTOLIC BLOOD PRESSURE: 91 MMHG

## 2024-10-28 DIAGNOSIS — F63.0 GAMBLING DISORDER, MODERATE: ICD-10-CM

## 2024-10-28 DIAGNOSIS — Z63.0 MARITAL CONFLICT: ICD-10-CM

## 2024-10-28 DIAGNOSIS — F32.A DEPRESSIVE DISORDER: ICD-10-CM

## 2024-10-28 DIAGNOSIS — F41.9 ANXIETY: Primary | ICD-10-CM

## 2024-10-28 DIAGNOSIS — F51.05 INSOMNIA RELATED TO ANOTHER MENTAL DISORDER: ICD-10-CM

## 2024-10-28 PROCEDURE — 90833 PSYTX W PT W E/M 30 MIN: CPT | Performed by: NURSE PRACTITIONER

## 2024-10-28 PROCEDURE — 99214 OFFICE O/P EST MOD 30 MIN: CPT | Performed by: NURSE PRACTITIONER

## 2024-10-28 SDOH — SOCIAL STABILITY - SOCIAL INSECURITY: PROBLEMS IN RELATIONSHIP WITH SPOUSE OR PARTNER: Z63.0

## 2024-10-28 NOTE — ASSESSMENT & PLAN NOTE
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  Reports she is worried if her  knows the financial situation he may ask her to leave.  We discussed her rights and that she needed to find her \"voice\" and stand up for herself and discussed the budget.  She reports she may go back to therapy.  Reports appetite is good and Seroquel 100 mg at night is effective.  She plans to talk to her  soon about creating a budget.  ELLY-7 score of 5 indicates mild anxiety and no panic         "

## 2024-10-28 NOTE — ASSESSMENT & PLAN NOTE
Marital conflict continues in the sense that they have poor communication and Janelle will talk to her therapist about communication skills.

## 2024-10-28 NOTE — PSYCH
"Visit Time    Visit Start Time: 1045  Visit Stop Time: 1115  Total Visit Duration:  30 minutes    Subjective:     Patient ID: Ina Tolbert is a 69 y.o. female.  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  Reports she is worried if her  knows the financial situation he may ask her to leave.  We discussed her rights and that she needed to find her \"voice\" and stand up for herself and discussed the budget.  She reports she may go back to therapy.  Reports appetite is good and Seroquel 100 mg at night is effective.  She plans to talk to her  soon about creating a budget.  ELLY-7 score of 5 indicates mild anxiety and no panic         Depressive disorder  Reports PHQ-9 score of 2 indicates no depression however she has reactive sadness to situations and financial stress.  She reports Cymbalta has taken the edge off of her stress and it has helped.  She is tearful because she feels like she is a failure and we discussed shared responsibilities when you are .  She agreed she denied suicidal ideations         Gambling disorder, moderate  Reports gambling disorder has resolved and has not gambled in quite sometime         Marital conflict  Marital conflict continues in the sense that they have poor communication and Janelle will talk to her therapist about communication skills.         Insomnia related to another mental disorder  Seroquel 100 mg helps at night to sleep            HPI ROS Appetite Changes and Sleep: normal appetite and decreased energy    Review Of Systems:     Mood Anxiety and Depression   Behavior Normal    Thought Content Normal   General Relationship Problems and Emotional Problems   Personality Normal "   Other Psych Symptoms Normal   Constitutional As Noted in HPI   ENT As Noted in HPI   Cardiovascular As Noted in HPI   Respiratory As Noted in HPI   Gastrointestinal As Noted in HPI   Genitourinary As Noted in HPI   Musculoskeletal As Noted in HPI   Integumentary As Noted in HPI   Neurological As Noted in HPI   Endocrine Hypothyroid   Other Symptoms Normal        Laboratory Results:     Substance Abuse History:  Social History     Substance and Sexual Activity   Drug Use No       Family Psychiatric History:   Family History   Problem Relation Age of Onset    Heart disease Mother     Stroke Mother 62    COPD Mother     Arthritis Mother     Asthma Mother     Hypertension Father     Dislocations Sister     Multiple sclerosis Sister     Neurological problems Sister     Scoliosis Sister     Depression Sister     Mental illness Sister     No Known Problems Maternal Grandmother     No Known Problems Maternal Grandfather     No Known Problems Paternal Grandmother     No Known Problems Paternal Grandfather     Kidney disease Brother         kidney transplant    No Known Problems Maternal Aunt     No Known Problems Maternal Uncle     No Known Problems Paternal Aunt     No Known Problems Paternal Uncle     ADD / ADHD Neg Hx     Anesthesia problems Neg Hx     Cancer Neg Hx     Clotting disorder Neg Hx     Collagen disease Neg Hx     Diabetes Neg Hx     Dislocations Neg Hx     Learning disabilities Neg Hx     Neurological problems Neg Hx     Osteoporosis Neg Hx     Rheumatologic disease Neg Hx     Scoliosis Neg Hx     Vascular Disease Neg Hx        The following portions of the patient's history were reviewed and updated as appropriate: allergies, current medications, past family history, past medical history, past social history, past surgical history, and problem list.    Social History     Socioeconomic History    Marital status: /Civil Union     Spouse name: Not on file    Number of children: Not on file    Years of  education: Not on file    Highest education level: Not on file   Occupational History    Not on file   Tobacco Use    Smoking status: Never     Passive exposure: Never    Smokeless tobacco: Never   Vaping Use    Vaping status: Never Used   Substance and Sexual Activity    Alcohol use: Not Currently    Drug use: No    Sexual activity: Not Currently     Partners: Male   Other Topics Concern    Not on file   Social History Narrative    Not on file     Social Determinants of Health     Financial Resource Strain: Not on file   Food Insecurity: No Food Insecurity (6/18/2024)    Nursing - Inadequate Food Risk Classification     Worried About Running Out of Food in the Last Year: Never true     Ran Out of Food in the Last Year: Never true     Ran Out of Food in the Last Year: Not on file   Transportation Needs: No Transportation Needs (6/18/2024)    PRAPARE - Transportation     Lack of Transportation (Medical): No     Lack of Transportation (Non-Medical): No   Physical Activity: Not on file   Stress: Not on file   Social Connections: Not on file   Intimate Partner Violence: Not on file   Housing Stability: Low Risk  (6/18/2024)    Housing Stability Vital Sign     Unable to Pay for Housing in the Last Year: No     Number of Times Moved in the Last Year: 0     Homeless in the Last Year: No     Social History     Social History Narrative    Not on file       Objective:       Mental status:  Appearance calm and cooperative , adequate hygiene and grooming, and good eye contact    Mood depressed and anxious   Affect affect was tearful and affect appropriate    Speech a normal rate   Thought Processes normal thought processes   Hallucinations no hallucinations present    Thought Content no delusions   Abnormal Thoughts no suicidal thoughts  and no homicidal thoughts    Orientation  oriented to person and place and time   Remote Memory short term memory intact and long term memory intact   Attention Span concentration intact  "  Intellect Appears to be of Average Intelligence   Insight Insight intact   Judgement judgment was intact   Muscle Strength Muscle strength and tone were normal   Language no difficulty naming common objects   Fund of Knowledge displays adequate knowledge of current events   Pain moderate to severe   Pain Scale 5       Assessment/Plan:       Diagnoses and all orders for this visit:    Anxiety    Depressive disorder    Gambling disorder, moderate    Marital conflict            Treatment Recommendations- Risks Benefits      Immediate Medical/Psychiatric/Psychotherapy Treatments and Any Precautions: Continue treatment plan    Risks, Benefits And Possible Side Effects Of Medications:  {PSYCH RISK, BENEFITS AND POSSIBLE SIDE EFFECTS (Optional):11287     Psychotherapy Provided: 30 minutes individual psychotherapy provided.   Supportive therapy  Medication evaluation  Mood assessment  Surveys reviewed and confirmed  Normalized and validated her feelings  Goals discussed in session: Maintain stable mood    \"Portions of the record may have been created with voice recognition software. Occasional wrong word or \"sound a like\" substitutions may have occurred due to the inherent limitations of voice recognition software. Read the chart carefully and recognize, using context, where substitutions have occurred. Please call if you have any questions. \"                                   "

## 2024-10-28 NOTE — ASSESSMENT & PLAN NOTE
Reports PHQ-9 score of 2 indicates no depression however she has reactive sadness to situations and financial stress.  She reports Cymbalta has taken the edge off of her stress and it has helped.  She is tearful because she feels like she is a failure and we discussed shared responsibilities when you are .  She agreed she denied suicidal ideations

## 2024-10-30 ENCOUNTER — TELEPHONE (OUTPATIENT)
Dept: INFUSION CENTER | Facility: HOSPITAL | Age: 69
End: 2024-10-30

## 2024-10-31 LAB
25(OH)D3+25(OH)D2 SERPL-MCNC: 51 NG/ML (ref 30–100)
ALBUMIN MFR UR ELPH: 27.4 %
ALBUMIN SERPL ELPH-MCNC: 3.3 G/DL (ref 2.9–4.4)
ALBUMIN/GLOB SERPL: 1 {RATIO} (ref 0.7–1.7)
ALP SERPL-CCNC: 110 IU/L (ref 44–121)
ALPHA1 GLOB MFR UR ELPH: 2.7 %
ALPHA1 GLOB SERPL ELPH-MCNC: 0.2 G/DL (ref 0–0.4)
ALPHA2 GLOB MFR UR ELPH: 20.9 %
ALPHA2 GLOB SERPL ELPH-MCNC: 1 G/DL (ref 0.4–1)
B-GLOBULIN MFR UR ELPH: 34.4 %
B-GLOBULIN SERPL ELPH-MCNC: 1 G/DL (ref 0.7–1.3)
BUN SERPL-MCNC: 17 MG/DL (ref 8–27)
BUN/CREAT SERPL: 20 (ref 12–28)
CALCIUM SERPL-MCNC: 9.1 MG/DL (ref 8.7–10.3)
CHLORIDE SERPL-SCNC: 104 MMOL/L (ref 96–106)
CO2 SERPL-SCNC: 23 MMOL/L (ref 20–29)
CREAT SERPL-MCNC: 0.86 MG/DL (ref 0.57–1)
EGFR: 74 ML/MIN/1.73
GAMMA GLOB MFR UR ELPH: 14.6 %
GAMMA GLOB SERPL ELPH-MCNC: 0.9 G/DL (ref 0.4–1.8)
GLOBULIN SER CALC-MCNC: 3.2 G/DL (ref 2.2–3.9)
GLUCOSE SERPL-MCNC: 105 MG/DL (ref 70–99)
LABORATORY COMMENT REPORT: NORMAL
LABORATORY COMMENT REPORT: NORMAL
M PROTEIN MFR UR ELPH: NORMAL %
M PROTEIN SERPL ELPH-MCNC: NORMAL G/DL
PHOSPHATE SERPL-MCNC: 2.4 MG/DL (ref 3–4.3)
POTASSIUM SERPL-SCNC: 5 MMOL/L (ref 3.5–5.2)
PROT SERPL-MCNC: 6.5 G/DL (ref 6–8.5)
PROT UR-MCNC: 21.4 MG/DL
SODIUM SERPL-SCNC: 143 MMOL/L (ref 134–144)
TSH SERPL DL<=0.005 MIU/L-ACNC: 1.52 UIU/ML (ref 0.45–4.5)

## 2024-11-09 DIAGNOSIS — F33.41 MAJOR DEPRESSIVE DISORDER, RECURRENT, IN PARTIAL REMISSION (HCC): ICD-10-CM

## 2024-11-11 ENCOUNTER — OFFICE VISIT (OUTPATIENT)
Dept: PULMONOLOGY | Facility: MEDICAL CENTER | Age: 69
End: 2024-11-11
Payer: COMMERCIAL

## 2024-11-11 VITALS
DIASTOLIC BLOOD PRESSURE: 84 MMHG | RESPIRATION RATE: 12 BRPM | WEIGHT: 165 LBS | BODY MASS INDEX: 27.49 KG/M2 | HEART RATE: 100 BPM | SYSTOLIC BLOOD PRESSURE: 118 MMHG | HEIGHT: 65 IN | TEMPERATURE: 97.6 F | OXYGEN SATURATION: 98 %

## 2024-11-11 DIAGNOSIS — J45.40 MODERATE PERSISTENT ASTHMA WITHOUT COMPLICATION: Primary | ICD-10-CM

## 2024-11-11 DIAGNOSIS — R93.89 ABNORMAL CT OF THE CHEST: ICD-10-CM

## 2024-11-11 DIAGNOSIS — J84.9 ILD (INTERSTITIAL LUNG DISEASE) (HCC): ICD-10-CM

## 2024-11-11 PROCEDURE — 99214 OFFICE O/P EST MOD 30 MIN: CPT | Performed by: STUDENT IN AN ORGANIZED HEALTH CARE EDUCATION/TRAINING PROGRAM

## 2024-11-11 RX ORDER — FLUTICASONE PROPIONATE AND SALMETEROL 100; 50 UG/1; UG/1
1 POWDER RESPIRATORY (INHALATION) 2 TIMES DAILY
Qty: 60 BLISTER | Refills: 3 | Status: SHIPPED | OUTPATIENT
Start: 2024-11-11

## 2024-11-11 RX ORDER — DULOXETIN HYDROCHLORIDE 60 MG/1
60 CAPSULE, DELAYED RELEASE ORAL DAILY
Qty: 90 CAPSULE | Refills: 1 | Status: SHIPPED | OUTPATIENT
Start: 2024-11-11

## 2024-11-11 NOTE — PROGRESS NOTES
Consultation - Pulmonary Medicine   Ian Tolbert 69 y.o. female MRN: [id number]      Reason for Consult: Asthma    Ina Tolbert is a 69 y.o. female with a PMH of Cervical Stenosis, Chron's?, Orthostatic Hypotension, TIA, HTN, Hypothyroidism, Depression, Raynaud's who presents for follow up     Asthma/Bronchiectasis - Patient has a myriad of rheumatologic symptoms but seronegative. Plan to complete the ILD serological evaluation. She has mild bronchiectasis/mosaicism on CT which may be related to small airway disease.  Her methacholine was borderline but has recurrent exacerbations consistent with Asthma. Plan to restart her ICS/LABA  - Trend Spirometry  - Restart Wixela  - Albuterol PRN  - Myositis panel   - Follow up 3-6 months    Edward Lobo MD  [facility]    _____________________________________________________________________  Interval Hx 11/11/24:   Recurrent Bronchitis? - Increased sputum production, coughing, wheezing - Prednisone burst  GI - No chron's   + Muscle pain, CK elevated past   [place] panel negative, Immunoglobulins Normal,   Scattered Joint Pian, + Raynauds, + Sicca  Methacholine test borderline positive - patient had difficulties with maneuvers  FVC - slight decline ~5%  Albuterol x2, not taking wixela       HPI:    Ina Tolbert is a 69 y.o. female with a PMH of Cervical Stenosis, Chron's, Orthostatic Hypotension, TIA, HTN, Hypothyroidism, Depression, Raynaud's who presents to establish care.     Recent hospitalization due to chron's ? Vs Campylobacter- on steroid taper - overall symptoms greatly improved   C/B shingles   Denies frequent infections   Occasional Wheezing, coughing, no sputum production    Tobacco: Never  Asthma Hx: possible- Dx 2018- exertional shortness of breath, infrequent Albuterol use  Triggers/Allergies: Occasional Seasonal   Exposure/Work:  [employer]    Pets: Dog  CHF Hx: HTN  Pulm Meds: Albuterol  ET: Limited by hip, dyspnea with  "2 Flights    PFT results:  The most recent pulmonary function tests were reviewed.    1/24/24 8/22/24        FVC  2.11 69% 1.98L        FEV1 1.74 73% 1.66L         FEV1/FVC 82%         TLC 4.18L 80%         RV 84%         DLCO 16.49 64%         BD Neg               Imaging:  I personally reviewed the images on the PAC system pertinent to today's visit  CT chest  1.  No filling defect to suggest acute or chronic pulmonary embolism in the entire pulmonary arterial system.  2.  Suggestion of mosaic lung parenchymal attenuation compatible with mild small vessel/small airways disease.  3.  No acute findings in the abdomen or pelvis.    Other studies:  Laboratory Studies  6/2020- DEV 1:80, speckled pattern  SSA/SSB negative    1/22- SPEP wnl  ESR/CRP wnl  CPK,aldolase wnl  Hep B/C negative  RF/CCP negative  DEV 1:160 speckled, DEV panel negative  SM/RNP Negative  Kanwal-1 Negative  Complement normal     TTE      Left Ventricle: Left ventricular cavity size is normal. Wall thickness is normal. The left ventricular ejection fraction is 60%. Systolic function is normal. Wall motion is normal. Diastolic function is normal.  Left atrial filling pressure is normal.    Left Atrium: The atrium is normal in size.    IVC/SVC: The right atrial pressure is estimated at 3.0 mmHg. The inferior vena cava is normal in size. Respirophasic changes were normal.    PhysicalExamination:  Vitals:   /84 (BP Location: Right arm, Patient Position: Sitting, Cuff Size: Extra-Large)   Pulse 100   Temp 97.6 °F (36.4 °C) (Temporal)   Resp 12   Ht 5' 5\" (1.651 m)   Wt 74.8 kg (165 lb)   LMP  (LMP Unknown)   SpO2 98%   BMI 27.46 kg/m²     Appearance -- NAD, speaking full sentences  Neuro -- A&Ox3  Neck -- no JVD  Heart -- RRR, no murmurs  Lungs -- scattered squeaks/wheezing  Abdomen -- soft, NTND  Extremities -- WWP, no LE edema  Skin -- no rash    Review of Systems:  Aside from what is mentioned in the HPI, the review of systems otherwise " negative.    Immunization History   Administered Date(s) Administered    COVID-19 J&J ([employer]) vaccine 0.5 mL 04/28/2021    COVID-19 MODERNA VACC 0.25 ML IM BOOSTER 01/25/2022    COVID-19 MODERNA VACC 0.5 ML IM 01/25/2022    INFLUENZA 09/21/2012, 03/04/2014, 12/12/2014, 03/05/2018, 09/27/2018, 11/18/2019, 11/19/2020    Influenza Quadrivalent Preservative Free 3 years and older IM 03/05/2018    Influenza, high dose seasonal 0.7 mL 11/10/2021, 11/24/2022    Influenza, recombinant, quadrivalent,injectable, preservative free 10/07/2020    Pneumococcal Conjugate 13-Valent 09/05/2019, 09/05/2019, 11/10/2021    Tdap 09/21/2012, 11/25/2020    Tuberculin Skin Test-PPD Intradermal 08/03/2021, 08/13/2024    Zoster Vaccine Recombinant 10/07/2020        Current Medications:    Current Outpatient Medications:     albuterol (ProAir HFA) 90 mcg/act inhaler, Inhale 2 puffs every 6 (six) hours as needed for wheezing, Disp: 8.5 g, Rfl: 6    Calcium Carb-Cholecalciferol 600-12.5 MG-MCG CAPS, Take by mouth daily in the early morning, Disp: , Rfl:     Denta 5000 Plus 1.1 % CREA, USE ONCE DAILY AT NIGHT, Disp: , Rfl:     docusate sodium (COLACE) 100 mg capsule, Take 1 capsule (100 mg total) by mouth 2 (two) times a day, Disp: 60 capsule, Rfl: 0    DULoxetine (CYMBALTA) 60 mg delayed release capsule, TAKE 1 CAPSULE BY MOUTH EVERY DAY, Disp: 30 capsule, Rfl: 0    esomeprazole (NexIUM) 40 MG capsule, TAKE 1 CAPSULE BY MOUTH EVERY DAY BEFORE BREAKFAST, Disp: 90 capsule, Rfl: 1    gabapentin (NEURONTIN) 300 mg capsule, Take 1 capsule (300 mg total) by mouth 3 (three) times a day, Disp: 270 capsule, Rfl: 3    levothyroxine 50 mcg tablet, TAKE 1 TABLET BY MOUTH EVERY DAY, Disp: 90 tablet, Rfl: 1    midodrine (PROAMATINE) 5 mg tablet, Take 5 mg by mouth daily, Disp: , Rfl:     QUEtiapine (SEROquel) 100 mg tablet, TAKE 1 TABLET BY MOUTH DAILY AT BEDTIME, Disp: 90 tablet, Rfl: 1    famotidine (PEPCID) 20 mg tablet, Take 1 tablet (20 mg total) by  mouth daily as needed for heartburn (Patient not taking: Reported on 11/11/2024), Disp: 90 tablet, Rfl: 3    Fluticasone-Salmeterol (Wixela Inhub) 100-50 mcg/dose inhaler, Inhale 1 puff 2 (two) times a day Rinse mouth after use. (Patient not taking: Reported on 10/28/2024), Disp: 60 blister, Rfl: 3    methylPREDNISolone 4 MG tablet therapy pack, Use as directed on package (Patient not taking: Reported on 11/11/2024), Disp: 21 tablet, Rfl: 0    Historical Information   Past Medical History:   Diagnosis Date    Abdominal adhesions     Alcoholism (HCC) 1996    in remission    Anesthesia complication     difficult to wake up    Anxiety     Arthritis     Asthma     with exertion    Cancer (HCC)     melanoma    Colon polyp     Crohn's disease (HCC)     Depression     Depression     Disease of thyroid gland     Fibromyalgia     Fibromyalgia, primary     Fractures 2006    GERD (gastroesophageal reflux disease)     History of cholecystectomy 09/07/2019    Hyperlipidemia     Infected tooth 01/2024    Tooth #14-was treated with antibiotics-endodontal appt 2/1 may need root canal    Irregular heart beat     can be tachy; also has orthoststic hypotension    Irritable bowel syndrome 1990    Lazy eye     resolved: 3/27/17    Medical clearance for psychiatric admission 07/13/2021    Melanoma (HCC) 2010    Mild asthma 11/04/2020    Osteoarthritis 07/2018    Osteopenia     with joint pain-elevated DEV    Polymyalgia (HCC)     Psychiatric disorder     Shortness of breath     assoc with tachycardia    Stomach disorder 1995    Tinnitus     Wears glasses     for reading     Past Surgical History:   Procedure Laterality Date    ABDOMINAL SURGERY      lysis of adhesions x 2    APPENDECTOMY      CERVICAL FUSION      May 5,2021 and Jan. 01,2020    CHOLECYSTECTOMY      open    COLONOSCOPY      DILATION AND CURETTAGE OF UTERUS      FOOT SURGERY  05/2017    NECK SURGERY  01/2019 5/2021    NEUROMA EXCISION Right 05/26/2017    Procedure:  EXCISION MASS / FIBROMA FOOT;  Surgeon: Jorden Crespo DPM;  Location: [facility];  Service:     PARS PLANA VITRECTOMY W/ REPAIR OF MACULAR HOLE  12/23/2020    MA ARTHRP KNE CONDYLE&PLATU MEDIAL&LAT COMPARTMENTS Right 2/6/2024    Procedure: ARTHROPLASTY KNEE TOTAL W ROBOT - RIGHT - PRESS FIT - OVERNIGHT;  Surgeon: Jairon Kim DO;  Location: [facility];  Service: Orthopedics    MA XCAPSL CTRC RMVL INSJ IO LENS PROSTH W/O ECP Left 12/06/2021    Procedure: EXTRACTION EXTRACAPSULAR CATARACT PHACO INTRAOCULAR LENS (IOL);  Surgeon: Mandeep Chavez MD;  Location: [facility] MA[facility] OR;  Service: Ophthalmology    RIGHT OOPHORECTOMY      UPPER GASTROINTESTINAL ENDOSCOPY  5/2019    WISDOM TOOTH EXTRACTION      x4     Social History   Social History     Tobacco Use   Smoking Status Never    Passive exposure: Never   Smokeless Tobacco Never       Family History:   Family History   Problem Relation Age of Onset    Heart disease Mother     Stroke Mother 62    COPD Mother     Arthritis Mother     Asthma Mother     Hypertension Father     Dislocations Sister     Multiple sclerosis Sister     Neurological problems Sister     Scoliosis Sister     Depression Sister     Mental illness Sister     No Known Problems Maternal Grandmother     No Known Problems Maternal Grandfather     No Known Problems Paternal Grandmother     No Known Problems Paternal Grandfather     Kidney disease Brother         kidney transplant    No Known Problems Maternal Aunt     No Known Problems Maternal Uncle     No Known Problems Paternal Aunt     No Known Problems Paternal Uncle     ADD / ADHD Neg Hx     Anesthesia problems Neg Hx     Cancer Neg Hx     Clotting disorder Neg Hx     Collagen disease Neg Hx     Diabetes Neg Hx     Dislocations Neg Hx     Learning disabilities Neg Hx     Neurological problems Neg Hx     Osteoporosis Neg Hx     Rheumatologic disease Neg Hx     Scoliosis Neg Hx     Vascular Disease Neg Hx                  Diagnostic Data:  Labs:  I  "personally reviewed the most recent laboratory data pertinent to today's visit    Lab Results   Component Value Date    WBC 4.44 06/18/2024    HGB 12.8 06/18/2024    HCT 40.2 06/18/2024    MCV 83 06/18/2024     06/18/2024     Lab Results   Component Value Date    GLUCOSE 95 08/04/2016    CALCIUM 8.9 06/18/2024     08/04/2016    K 5.0 10/28/2024    CO2 23 10/28/2024     10/28/2024    BUN 17 10/28/2024    CREATININE 0.86 10/28/2024     No results found for: \"IGE\"  Lab Results   Component Value Date    ALT 20 06/17/2024    AST 21 06/17/2024    ALKPHOS 66 06/17/2024    BILITOT 0.2 08/04/2016           I have spent a total time of 20 minutes on 11/11/24 in caring for this patient including Diagnostic results, Prognosis, Risks and benefits of tx options, Instructions for management, Patient and family education, Importance of tx compliance, Risk factor reductions, Impressions, Counseling / Coordination of care, Documenting in the medical record, Reviewing / ordering tests, medicine, procedures  , Obtaining or reviewing history  , and Communicating with other healthcare professionals .   _        "

## 2024-11-14 ENCOUNTER — HOSPITAL ENCOUNTER (OUTPATIENT)
Dept: INFUSION CENTER | Facility: HOSPITAL | Age: 69
Discharge: HOME | End: 2024-11-14
Attending: STUDENT IN AN ORGANIZED HEALTH CARE EDUCATION/TRAINING PROGRAM
Payer: COMMERCIAL

## 2024-11-14 VITALS
OXYGEN SATURATION: 98 % | HEART RATE: 104 BPM | SYSTOLIC BLOOD PRESSURE: 131 MMHG | RESPIRATION RATE: 18 BRPM | DIASTOLIC BLOOD PRESSURE: 76 MMHG | TEMPERATURE: 96.8 F | BODY MASS INDEX: 27.15 KG/M2 | WEIGHT: 163.14 LBS

## 2024-11-14 DIAGNOSIS — M81.0 AGE-RELATED OSTEOPOROSIS WITHOUT CURRENT PATHOLOGICAL FRACTURE: Primary | ICD-10-CM

## 2024-11-14 PROCEDURE — 96365 THER/PROPH/DIAG IV INF INIT: CPT

## 2024-11-14 RX ORDER — ZOLEDRONIC ACID 0.05 MG/ML
5 INJECTION, SOLUTION INTRAVENOUS ONCE
OUTPATIENT
Start: 2025-11-14

## 2024-11-14 RX ORDER — SODIUM CHLORIDE 9 MG/ML
20 INJECTION, SOLUTION INTRAVENOUS ONCE
Status: COMPLETED | OUTPATIENT
Start: 2024-11-14 | End: 2024-11-14

## 2024-11-14 RX ORDER — ZOLEDRONIC ACID 0.05 MG/ML
5 INJECTION, SOLUTION INTRAVENOUS ONCE
Status: COMPLETED | OUTPATIENT
Start: 2024-11-14 | End: 2024-11-14

## 2024-11-14 RX ORDER — SODIUM CHLORIDE 9 MG/ML
20 INJECTION, SOLUTION INTRAVENOUS ONCE
OUTPATIENT
Start: 2025-11-14

## 2024-11-14 RX ADMIN — SODIUM CHLORIDE 20 ML/HR: 9 INJECTION, SOLUTION INTRAVENOUS at 08:44

## 2024-11-14 RX ADMIN — ZOLEDRONIC ACID 5 MG: 0.05 INJECTION, SOLUTION INTRAVENOUS at 08:44

## 2024-11-14 NOTE — PROGRESS NOTES
Pt here for first reclats, offers no complaints, denies recent or planned dental work, labs reviewed, pt will scheduled her yearly reclast at a future time, declined AVS

## 2024-11-19 ENCOUNTER — HOSPITAL ENCOUNTER (OUTPATIENT)
Dept: PULMONOLOGY | Facility: HOSPITAL | Age: 69
Discharge: HOME | End: 2024-11-19
Attending: STUDENT IN AN ORGANIZED HEALTH CARE EDUCATION/TRAINING PROGRAM
Payer: COMMERCIAL

## 2024-11-19 DIAGNOSIS — R93.89 ABNORMAL CT OF THE CHEST: ICD-10-CM

## 2024-11-19 DIAGNOSIS — J45.40 MODERATE PERSISTENT ASTHMA WITHOUT COMPLICATION: ICD-10-CM

## 2024-11-19 DIAGNOSIS — J84.9 ILD (INTERSTITIAL LUNG DISEASE) (HCC): ICD-10-CM

## 2024-11-19 PROCEDURE — 94760 N-INVAS EAR/PLS OXIMETRY 1: CPT

## 2024-11-19 PROCEDURE — 94729 DIFFUSING CAPACITY: CPT

## 2024-11-19 PROCEDURE — 94010 BREATHING CAPACITY TEST: CPT | Performed by: INTERNAL MEDICINE

## 2024-11-19 PROCEDURE — 94729 DIFFUSING CAPACITY: CPT | Performed by: INTERNAL MEDICINE

## 2024-11-19 PROCEDURE — 94010 BREATHING CAPACITY TEST: CPT

## 2024-11-24 ENCOUNTER — RESULTS FOLLOW-UP (OUTPATIENT)
Dept: PULMONOLOGY | Facility: MEDICAL CENTER | Age: 69
End: 2024-11-24

## 2024-11-25 NOTE — TELEPHONE ENCOUNTER
Pt calls back informed her of providers note above. Pt verbalizes understanding and gives thanks. She asked which clinic should she schedule her follow up with. Please advise

## 2024-11-27 NOTE — TELEPHONE ENCOUNTER
Spoke with patient offered to schedule 3 month follow up appointment in February patient declined stating she will call office back in March to schedule a appointment.   Yes

## 2024-12-02 ENCOUNTER — APPOINTMENT (OUTPATIENT)
Dept: RADIOLOGY | Facility: MEDICAL CENTER | Age: 69
End: 2024-12-02
Payer: COMMERCIAL

## 2024-12-02 ENCOUNTER — RESULTS FOLLOW-UP (OUTPATIENT)
Dept: FAMILY MEDICINE CLINIC | Facility: CLINIC | Age: 69
End: 2024-12-02

## 2024-12-02 DIAGNOSIS — J18.9 PNEUMONIA OF LEFT LOWER LOBE DUE TO INFECTIOUS ORGANISM: ICD-10-CM

## 2024-12-02 PROCEDURE — 71046 X-RAY EXAM CHEST 2 VIEWS: CPT

## 2024-12-04 DIAGNOSIS — K21.9 GASTROESOPHAGEAL REFLUX DISEASE, UNSPECIFIED WHETHER ESOPHAGITIS PRESENT: ICD-10-CM

## 2024-12-04 DIAGNOSIS — R13.14 PHARYNGOESOPHAGEAL DYSPHAGIA: ICD-10-CM

## 2024-12-05 RX ORDER — ESOMEPRAZOLE MAGNESIUM 40 MG/1
40 CAPSULE, DELAYED RELEASE ORAL
Qty: 90 CAPSULE | Refills: 1 | Status: SHIPPED | OUTPATIENT
Start: 2024-12-05

## 2024-12-21 ENCOUNTER — APPOINTMENT (EMERGENCY)
Dept: RADIOLOGY | Facility: HOSPITAL | Age: 69
End: 2024-12-21
Payer: COMMERCIAL

## 2024-12-21 ENCOUNTER — HOSPITAL ENCOUNTER (EMERGENCY)
Facility: HOSPITAL | Age: 69
Discharge: HOME | End: 2024-12-21
Attending: EMERGENCY MEDICINE
Payer: COMMERCIAL

## 2024-12-21 VITALS
BODY MASS INDEX: 27.51 KG/M2 | RESPIRATION RATE: 25 BRPM | WEIGHT: 165.34 LBS | SYSTOLIC BLOOD PRESSURE: 170 MMHG | HEART RATE: 122 BPM | OXYGEN SATURATION: 95 % | DIASTOLIC BLOOD PRESSURE: 81 MMHG | TEMPERATURE: 98 F

## 2024-12-21 DIAGNOSIS — J20.9 ACUTE BRONCHITIS: Primary | ICD-10-CM

## 2024-12-21 DIAGNOSIS — R06.2 WHEEZING: ICD-10-CM

## 2024-12-21 LAB
ALBUMIN SERPL BCG-MCNC: 4 G/DL (ref 3.5–5)
ALP SERPL-CCNC: 82 U/L (ref 34–104)
ALT SERPL W P-5'-P-CCNC: 13 U/L (ref 7–52)
ANION GAP SERPL CALCULATED.3IONS-SCNC: 6 MMOL/L (ref 4–13)
AST SERPL W P-5'-P-CCNC: 18 U/L (ref 13–39)
BASOPHILS # BLD AUTO: 0.05 THOUSANDS/ÂΜL (ref 0–0.1)
BASOPHILS NFR BLD AUTO: 1 % (ref 0–1)
BILIRUB SERPL-MCNC: 0.3 MG/DL (ref 0.2–1)
BUN SERPL-MCNC: 11 MG/DL (ref 5–25)
CALCIUM SERPL-MCNC: 9 MG/DL (ref 8.4–10.2)
CARDIAC TROPONIN I PNL SERPL HS: 4 NG/L (ref ?–50)
CHLORIDE SERPL-SCNC: 106 MMOL/L (ref 96–108)
CO2 SERPL-SCNC: 24 MMOL/L (ref 21–32)
CREAT SERPL-MCNC: 0.75 MG/DL (ref 0.6–1.3)
D DIMER PPP FEU-MCNC: 0.75 UG/ML FEU
EOSINOPHIL # BLD AUTO: 0.41 THOUSAND/ÂΜL (ref 0–0.61)
EOSINOPHIL NFR BLD AUTO: 5 % (ref 0–6)
ERYTHROCYTE [DISTWIDTH] IN BLOOD BY AUTOMATED COUNT: 13.7 % (ref 11.6–15.1)
FLUAV AG UPPER RESP QL IA.RAPID: NEGATIVE
FLUBV AG UPPER RESP QL IA.RAPID: NEGATIVE
GFR SERPL CREATININE-BSD FRML MDRD: 82 ML/MIN/1.73SQ M
GLUCOSE SERPL-MCNC: 114 MG/DL (ref 65–140)
HCT VFR BLD AUTO: 39 % (ref 34.8–46.1)
HGB BLD-MCNC: 12.3 G/DL (ref 11.5–15.4)
IMM GRANULOCYTES # BLD AUTO: 0.03 THOUSAND/UL (ref 0–0.2)
IMM GRANULOCYTES NFR BLD AUTO: 0 % (ref 0–2)
LYMPHOCYTES # BLD AUTO: 0.98 THOUSANDS/ÂΜL (ref 0.6–4.47)
LYMPHOCYTES NFR BLD AUTO: 11 % (ref 14–44)
MCH RBC QN AUTO: 26.8 PG (ref 26.8–34.3)
MCHC RBC AUTO-ENTMCNC: 31.5 G/DL (ref 31.4–37.4)
MCV RBC AUTO: 85 FL (ref 82–98)
MONOCYTES # BLD AUTO: 0.84 THOUSAND/ÂΜL (ref 0.17–1.22)
MONOCYTES NFR BLD AUTO: 10 % (ref 4–12)
NEUTROPHILS # BLD AUTO: 6.48 THOUSANDS/ÂΜL (ref 1.85–7.62)
NEUTS SEG NFR BLD AUTO: 73 % (ref 43–75)
NRBC BLD AUTO-RTO: 0 /100 WBCS
PLATELET # BLD AUTO: 268 THOUSANDS/UL (ref 149–390)
PMV BLD AUTO: 9.7 FL (ref 8.9–12.7)
POTASSIUM SERPL-SCNC: 4.2 MMOL/L (ref 3.5–5.3)
PROT SERPL-MCNC: 6.8 G/DL (ref 6.4–8.4)
RBC # BLD AUTO: 4.59 MILLION/UL (ref 3.81–5.12)
SARS-COV+SARS-COV-2 AG RESP QL IA.RAPID: NEGATIVE
SODIUM SERPL-SCNC: 136 MMOL/L (ref 135–147)
WBC # BLD AUTO: 8.79 THOUSAND/UL (ref 4.31–10.16)

## 2024-12-21 PROCEDURE — 93005 ELECTROCARDIOGRAM TRACING: CPT

## 2024-12-21 PROCEDURE — 36415 COLL VENOUS BLD VENIPUNCTURE: CPT | Performed by: EMERGENCY MEDICINE

## 2024-12-21 PROCEDURE — 84484 ASSAY OF TROPONIN QUANT: CPT | Performed by: EMERGENCY MEDICINE

## 2024-12-21 PROCEDURE — 85379 FIBRIN DEGRADATION QUANT: CPT | Performed by: EMERGENCY MEDICINE

## 2024-12-21 PROCEDURE — 80053 COMPREHEN METABOLIC PANEL: CPT | Performed by: EMERGENCY MEDICINE

## 2024-12-21 PROCEDURE — 94644 CONT INHLJ TX 1ST HOUR: CPT

## 2024-12-21 PROCEDURE — 71275 CT ANGIOGRAPHY CHEST: CPT

## 2024-12-21 PROCEDURE — 71045 X-RAY EXAM CHEST 1 VIEW: CPT

## 2024-12-21 PROCEDURE — 99285 EMERGENCY DEPT VISIT HI MDM: CPT

## 2024-12-21 PROCEDURE — 87811 SARS-COV-2 COVID19 W/OPTIC: CPT | Performed by: EMERGENCY MEDICINE

## 2024-12-21 PROCEDURE — 96374 THER/PROPH/DIAG INJ IV PUSH: CPT

## 2024-12-21 PROCEDURE — 87804 INFLUENZA ASSAY W/OPTIC: CPT | Performed by: EMERGENCY MEDICINE

## 2024-12-21 PROCEDURE — 99285 EMERGENCY DEPT VISIT HI MDM: CPT | Performed by: EMERGENCY MEDICINE

## 2024-12-21 PROCEDURE — 85025 COMPLETE CBC W/AUTO DIFF WBC: CPT | Performed by: EMERGENCY MEDICINE

## 2024-12-21 RX ORDER — AZITHROMYCIN 250 MG/1
TABLET, FILM COATED ORAL
Qty: 6 TABLET | Refills: 0 | Status: SHIPPED | OUTPATIENT
Start: 2024-12-21 | End: 2024-12-25

## 2024-12-21 RX ORDER — ALBUTEROL SULFATE 0.83 MG/ML
2.5 SOLUTION RESPIRATORY (INHALATION) ONCE
Status: COMPLETED | OUTPATIENT
Start: 2024-12-21 | End: 2024-12-21

## 2024-12-21 RX ORDER — ALBUTEROL SULFATE 0.83 MG/ML
2.5 SOLUTION RESPIRATORY (INHALATION) EVERY 6 HOURS PRN
Qty: 75 ML | Refills: 0 | Status: SHIPPED | OUTPATIENT
Start: 2024-12-21

## 2024-12-21 RX ORDER — ALBUTEROL SULFATE 90 UG/1
2 INHALANT RESPIRATORY (INHALATION) ONCE
Status: COMPLETED | OUTPATIENT
Start: 2024-12-21 | End: 2024-12-21

## 2024-12-21 RX ORDER — PREDNISONE 20 MG/1
60 TABLET ORAL DAILY
Qty: 15 TABLET | Refills: 0 | Status: SHIPPED | OUTPATIENT
Start: 2024-12-21 | End: 2024-12-26

## 2024-12-21 RX ORDER — METHYLPREDNISOLONE SODIUM SUCCINATE 125 MG/2ML
125 INJECTION, POWDER, LYOPHILIZED, FOR SOLUTION INTRAMUSCULAR; INTRAVENOUS ONCE
Status: COMPLETED | OUTPATIENT
Start: 2024-12-21 | End: 2024-12-21

## 2024-12-21 RX ORDER — ALBUTEROL SULFATE 90 UG/1
2 INHALANT RESPIRATORY (INHALATION) EVERY 6 HOURS PRN
Qty: 18 G | Refills: 0 | Status: SHIPPED | OUTPATIENT
Start: 2024-12-21

## 2024-12-21 RX ORDER — ALBUTEROL SULFATE 5 MG/ML
10 SOLUTION RESPIRATORY (INHALATION) ONCE
Status: COMPLETED | OUTPATIENT
Start: 2024-12-21 | End: 2024-12-21

## 2024-12-21 RX ORDER — SODIUM CHLORIDE FOR INHALATION 0.9 %
12 VIAL, NEBULIZER (ML) INHALATION ONCE
Status: COMPLETED | OUTPATIENT
Start: 2024-12-21 | End: 2024-12-21

## 2024-12-21 RX ADMIN — METHYLPREDNISOLONE SODIUM SUCCINATE 125 MG: 125 INJECTION, POWDER, FOR SOLUTION INTRAMUSCULAR; INTRAVENOUS at 17:18

## 2024-12-21 RX ADMIN — IOHEXOL 85 ML: 350 INJECTION, SOLUTION INTRAVENOUS at 18:25

## 2024-12-21 RX ADMIN — IPRATROPIUM BROMIDE 1 MG: 0.5 SOLUTION RESPIRATORY (INHALATION) at 17:13

## 2024-12-21 RX ADMIN — ALBUTEROL SULFATE 10 MG: 2.5 SOLUTION RESPIRATORY (INHALATION) at 17:13

## 2024-12-21 RX ADMIN — ALBUTEROL SULFATE 2 PUFF: 90 AEROSOL, METERED RESPIRATORY (INHALATION) at 19:05

## 2024-12-21 RX ADMIN — ALBUTEROL SULFATE 2.5 MG: 2.5 SOLUTION RESPIRATORY (INHALATION) at 19:05

## 2024-12-21 RX ADMIN — ISODIUM CHLORIDE 12 ML: 0.03 SOLUTION RESPIRATORY (INHALATION) at 17:13

## 2024-12-21 NOTE — ED PROVIDER NOTES
ED Disposition       None          Assessment & Plan       Medical Decision Making  Most likely this is asthmatic bronchitis.  No pneumonia on chest x-ray.  D-dimer was elevated.  CTA of chest is pending.  Chest x-ray was negative for pulmonary edema.  There is no pneumothorax.  Patient improved with heart neb, steroids.  She is still wheezing but feels well enough to try to go home.  Appropriate for discharge and outpatient management.    Amount and/or Complexity of Data Reviewed  Labs: ordered. Decision-making details documented in ED Course.  Radiology: ordered and independent interpretation performed. Decision-making details documented in ED Course.  ECG/medicine tests: ordered and independent interpretation performed. Decision-making details documented in ED Course.    Risk  Prescription drug management.  Decision regarding hospitalization.           Medications - No data to display    ED Risk Strat Scores                                              History of Present Illness       Chief Complaint   Patient presents with   • Cough     Pt c/o coughing since wed worse yest. Pt has a headache with gen weakness       Past Medical History:   Diagnosis Date   • Abdominal adhesions    • Alcoholism (HCC) 1996    in remission   • Anesthesia complication     difficult to wake up   • Anxiety    • Arthritis    • Asthma     with exertion   • Cancer (HCC)     melanoma   • Colon polyp    • Crohn's disease (HCC)    • Depression    • Depression    • Disease of thyroid gland    • Fibromyalgia    • Fibromyalgia, primary    • Fractures 2006   • GERD (gastroesophageal reflux disease)    • History of cholecystectomy 09/07/2019   • Hyperlipidemia    • Infected tooth 01/2024    Tooth #14-was treated with antibiotics-endodontal appt 2/1 may need root canal   • Irregular heart beat     can be tachy; also has orthoststic hypotension   • Irritable bowel syndrome 1990   • Lazy eye     resolved: 3/27/17   • Medical clearance for  psychiatric admission 07/13/2021   • Melanoma ([facility]) 2010   • Mild asthma 11/04/2020   • Osteoarthritis 07/2018   • Osteopenia     with joint pain-elevated DEV   • Polymyalgia (HCC) 2/10/2022   • Psychiatric disorder    • Shortness of breath     assoc with tachycardia   • Stomach disorder 1995   • Tinnitus    • Wears glasses     for reading      Past Surgical History:   Procedure Laterality Date   • ABDOMINAL SURGERY      lysis of adhesions x 2   • APPENDECTOMY     • CERVICAL FUSION      May 5,2021 and Jan. 01,2020   • CHOLECYSTECTOMY      open   • COLONOSCOPY     • DILATION AND CURETTAGE OF UTERUS     • FOOT SURGERY  05/2017   • NECK SURGERY  01/2019 5/2021   • NEUROMA EXCISION Right 05/26/2017    Procedure: EXCISION MASS / FIBROMA FOOT;  Surgeon: Jorden Crespo DPM;  Location: [facility] MAIN OR;  Service:    • PARS PLANA VITRECTOMY W/ REPAIR OF MACULAR HOLE  12/23/2020   • NM ARTHRP KNE CONDYLE&PLATU MEDIAL&LAT COMPARTMENTS Right 2/6/2024    Procedure: ARTHROPLASTY KNEE TOTAL W ROBOT - RIGHT - PRESS FIT - OVERNIGHT;  Surgeon: Jairon Kim DO;  Location: [facility];  Service: Orthopedics   • NM XCAPSL CTRC RMVL INSJ IO LENS PROSTH W/O ECP Left 12/06/2021    Procedure: EXTRACTION EXTRACAPSULAR CATARACT PHACO INTRAOCULAR LENS (IOL);  Surgeon: Mandeep Chavez MD;  Location: [facility] MAIN OR;  Service: Ophthalmology   • RIGHT OOPHORECTOMY     • UPPER GASTROINTESTINAL ENDOSCOPY  5/2019   • WISDOM TOOTH EXTRACTION      x4      Family History   Problem Relation Age of Onset   • Heart disease Mother    • Stroke Mother 62   • COPD Mother    • Arthritis Mother    • Asthma Mother    • Hypertension Father    • Dislocations Sister    • Multiple sclerosis Sister    • Neurological problems Sister    • Scoliosis Sister    • Depression Sister    • Mental illness Sister    • No Known Problems Maternal Grandmother    • No Known Problems Maternal Grandfather    • No Known Problems Paternal Grandmother    • No Known Problems Paternal  Grandfather    • Kidney disease Brother         kidney transplant   • No Known Problems Maternal Aunt    • No Known Problems Maternal Uncle    • No Known Problems Paternal Aunt    • No Known Problems Paternal Uncle    • ADD / ADHD Neg Hx    • Anesthesia problems Neg Hx    • Cancer Neg Hx    • Clotting disorder Neg Hx    • Collagen disease Neg Hx    • Diabetes Neg Hx    • Dislocations Neg Hx    • Learning disabilities Neg Hx    • Neurological problems Neg Hx    • Osteoporosis Neg Hx    • Rheumatologic disease Neg Hx    • Scoliosis Neg Hx    • Vascular Disease Neg Hx       Social History     Tobacco Use   • Smoking status: Never     Passive exposure: Never   • Smokeless tobacco: Never   Vaping Use   • Vaping status: Never Used   Substance Use Topics   • Alcohol use: Not Currently   • Drug use: No      E-Cigarette/Vaping   • E-Cigarette Use Never User       E-Cigarette/Vaping Substances   • Nicotine No    • THC No    • CBD No    • Flavoring No    • Other No    • Unknown No       I have reviewed and agree with the history as documented.     Patient is a 69-year-old female.  She is a non-smoker.  She does have a history of asthma.  She has been sick with a cough for about a week.  No fever.  She does have a headache.  No congestion or rhinorrhea.  No sore throat.  No myalgias.  Allergic reaction increased infection she does report some dyspnea.  No chest pain.  No calf pain or unilateral leg swelling.  Cough is productive of brown and sometimes bloody sputum.      Review of Systems   Constitutional:  Positive for chills. Negative for fever.   HENT:  Negative for rhinorrhea and sore throat.    Eyes:  Negative for pain, redness and visual disturbance.   Respiratory:  Positive for cough, shortness of breath and wheezing.    Cardiovascular:  Negative for chest pain and leg swelling.   Gastrointestinal:  Negative for abdominal pain, diarrhea and vomiting.   Endocrine: Negative for polydipsia and polyuria.   Genitourinary:   Negative for dysuria, frequency, hematuria, vaginal bleeding and vaginal discharge.   Musculoskeletal:  Negative for back pain and neck pain.   Skin:  Negative for rash and wound.   Allergic/Immunologic: Negative for immunocompromised state.   Neurological:  Positive for headaches. Negative for weakness and numbness.   Hematological:  Does not bruise/bleed easily.   Psychiatric/Behavioral:  Negative for hallucinations and suicidal ideas.    All other systems reviewed and are negative.          Objective       ED Triage Vitals [12/21/24 1611]   Temperature Pulse Blood Pressure Respirations SpO2 Patient Position - Orthostatic VS   99.6 °F (37.6 °C) 99 136/85 20 95 % --      Temp src Heart Rate Source BP Location FiO2 (%) Pain Score    -- Monitor -- -- --      Vitals      Date and Time Temp Pulse SpO2 Resp BP Pain Score FACES Pain Rating User   12/21/24 1611 99.6 °F (37.6 °C) 99 95 % 20 136/85 -- -- KAREN            Physical Exam  Vitals reviewed.   Constitutional:       General: She is not in acute distress.  HENT:      Head: Normocephalic and atraumatic.      Nose: Nose normal.      Mouth/Throat:      Mouth: Mucous membranes are moist.   Eyes:      General:         Right eye: No discharge.         Left eye: No discharge.      Conjunctiva/sclera: Conjunctivae normal.   Cardiovascular:      Rate and Rhythm: Regular rhythm. Tachycardia present.      Pulses: Normal pulses.      Heart sounds: Normal heart sounds. No murmur heard.     No friction rub. No gallop.   Pulmonary:      Effort: Pulmonary effort is normal. No respiratory distress.      Breath sounds: No stridor. Wheezing present. No rhonchi or rales.   Abdominal:      General: Bowel sounds are normal. There is no distension.      Palpations: Abdomen is soft.      Tenderness: There is no abdominal tenderness. There is no right CVA tenderness, left CVA tenderness, guarding or rebound.   Musculoskeletal:         General: No swelling, tenderness, deformity or signs of  injury. Normal range of motion.      Cervical back: Normal range of motion and neck supple. No rigidity.      Right lower leg: No edema.      Left lower leg: No edema.      Comments: No calf tenderness or unilateral leg swelling.   Skin:     General: Skin is warm and dry.      Coloration: Skin is not jaundiced.      Findings: No rash.   Neurological:      General: No focal deficit present.      Mental Status: She is alert and oriented to person, place, and time.      Sensory: No sensory deficit.      Motor: Motor function is intact.   Psychiatric:         Mood and Affect: Mood normal.         Behavior: Behavior normal.       Results Reviewed       Procedure Component Value Units Date/Time    FLU/COVID Rapid Antigen (30 min. TAT) - Preferred screening test in ED [[id number]]     Lab Status: No result Specimen: Nares from Nose             No orders to display       ECG 12 Lead Documentation Only    Date/Time: 12/21/2024 5:11 PM    Performed by: Akhil Dickerson MD  Authorized by: Akhil Dickerson MD    ECG reviewed by me, the ED Provider: yes    Patient location:  ED  Comments:      Normal sinus rhythm.  Possible left atrial enlargement.  No acute ischemic ST or T wave changes.  No STEMI.  Nonspecific EKG      ED Medication and Procedure Management   Prior to Admission Medications   Prescriptions Last Dose Informant Patient Reported? Taking?   Calcium Carb-Cholecalciferol 600-12.5 MG-MCG CAPS  Self Yes No   Sig: Take by mouth daily in the early morning   DULoxetine (CYMBALTA) 60 mg delayed release capsule   No No   Sig: TAKE 1 CAPSULE BY MOUTH EVERY DAY   Denta 5000 Plus 1.1 % CREA  Self Yes No   Sig: USE ONCE DAILY AT NIGHT   Fluticasone-Salmeterol (Wixela Inhub) 100-50 mcg/dose inhaler   No No   Sig: Inhale 1 puff 2 (two) times a day Rinse mouth after use.   QUEtiapine (SEROquel) 100 mg tablet  Self No No   Sig: TAKE 1 TABLET BY MOUTH DAILY AT BEDTIME   albuterol (ProAir HFA) 90 mcg/act inhaler  Self No No   Sig:  Inhale 2 puffs every 6 (six) hours as needed for wheezing   docusate sodium (COLACE) 100 mg capsule  Self No No   Sig: Take 1 capsule (100 mg total) by mouth 2 (two) times a day   esomeprazole (NexIUM) 40 MG capsule   No No   Sig: TAKE 1 CAPSULE BY MOUTH EVERY DAY BEFORE BREAKFAST   famotidine (PEPCID) 20 mg tablet  Self No No   Sig: Take 1 tablet (20 mg total) by mouth daily as needed for heartburn   Patient not taking: Reported on 11/11/2024   gabapentin (NEURONTIN) 300 mg capsule  Self No No   Sig: Take 1 capsule (300 mg total) by mouth 3 (three) times a day   levothyroxine 50 mcg tablet  Self No No   Sig: TAKE 1 TABLET BY MOUTH EVERY DAY   methylPREDNISolone 4 MG tablet therapy pack  Self No No   Sig: Use as directed on package   Patient not taking: Reported on 11/11/2024   midodrine (PROAMATINE) 5 mg tablet  Self Yes No   Sig: Take 5 mg by mouth daily      Facility-Administered Medications: None     Patient's Medications   Discharge Prescriptions    No medications on file     No discharge procedures on file.  ED SEPSIS DOCUMENTATION            Akhil Dickerson MD  12/21/24 1710       Akhil Dickerson MD  12/22/24 1023

## 2024-12-22 LAB
ATRIAL RATE: 93 BPM
P AXIS: 59 DEGREES
PR INTERVAL: 168 MS
QRS AXIS: -1 DEGREES
QRSD INTERVAL: 82 MS
QT INTERVAL: 352 MS
QTC INTERVAL: 437 MS
T WAVE AXIS: 58 DEGREES
VENTRICULAR RATE: 93 BPM

## 2024-12-22 PROCEDURE — 93010 ELECTROCARDIOGRAM REPORT: CPT | Performed by: INTERNAL MEDICINE

## 2024-12-22 NOTE — ED NOTES
"Awake alert, pain to chest with cough, Resp is non labor. Moist cough, exp wheezing noted. Reported that \"feeling better\".      Kenisha Granda RN  12/21/24 1912    "
Proventil to take home per MD.      Kenisha Granda, RN  12/21/24 3133    
Pulse ox during ambulation 93-95% RA, resp slight tachy, no distress. MD made aware.      Kenisha Granda, RN  12/21/24 2005    
show

## 2024-12-22 NOTE — ED CARE HANDOFF
Emergency Department Sign Out Note        Sign out and transfer of care from Dr. Dickerson. See Separate Emergency Department note.     The patient, Ina Tolbert, was evaluated by the previous provider for wheezing, cough, shortness of breath, hemoptysis.    Workup Completed:  CBC, CMP, D-dimer, troponin, COVID and flu swab.  Patient with a mildly elevated D-dimer.    ED Course / Workup Pending (followup):  Signed out pending results of CTA PE study which was negative for PE.  CTA PE study without evidence of pulmonary embolism or pneumonia.  Patient states that she still feels a bit tight but overall feels better after multiple breathing treatments.  Patient able to drink a cup of water.  Ambulated around the emergency department without desaturations.  Discharged with return precautions.                                ED Course as of 12/21/24 2018   Sat Dec 21, 2024   1903 69F history of asthma with cough, wheezing, episode of hemoptysis, mildly elevated d dimer. Feeling better after nebs. Pending CTA.      Procedures  Medical Decision Making  Amount and/or Complexity of Data Reviewed  Labs: ordered.  Radiology: ordered and independent interpretation performed.    Risk  Prescription drug management.            Disposition  Final diagnoses:   Acute bronchitis   Wheezing     Time reflects when diagnosis was documented in both MDM as applicable and the Disposition within this note       Time User Action Codes Description Comment    12/21/2024  6:50 PM Akhil Dickerson Add [J20.9] Acute bronchitis     12/21/2024  6:50 PM Akhil Dickerson Add [R06.2] Wheezing           ED Disposition       ED Disposition   Discharge    Condition   Stable    Date/Time   Sat Dec 21, 2024  6:49 PM    Comment   Ina Tolbert discharge to home/self care.                   Follow-up Information       Follow up With Specialties Details Why Contact Info    Tiffany Kamara MD Internal Medicine, Family Medicine In 2 days  [address]  [place] [address]  [phone]            Patient's Medications   Discharge Prescriptions    ALBUTEROL (2.5 MG/3 ML) 0.083 % NEBULIZER SOLUTION    Take 3 mL (2.5 mg total) by nebulization every 6 (six) hours as needed for wheezing or shortness of breath       Start Date: 12/21/2024End Date: --       Order Dose: 2.5 mg       Quantity: 75 mL    Refills: 0    ALBUTEROL (PROVENTIL HFA,VENTOLIN HFA) 90 MCG/ACT INHALER    Inhale 2 puffs every 6 (six) hours as needed for wheezing or shortness of breath       Start Date: 12/21/2024End Date: --       Order Dose: 2 puffs       Quantity: 18 g    Refills: 0    AZITHROMYCIN (ZITHROMAX Z-ALISSA) 250 MG TABLET    Take 2 tablets today then 1 tablet daily x 4 days       Start Date: 12/21/2024End Date: 12/25/2024       Order Dose: --       Quantity: 6 tablet    Refills: 0    PREDNISONE 20 MG TABLET    Take 3 tablets (60 mg total) by mouth daily for 5 days       Start Date: 12/21/2024End Date: 12/26/2024       Order Dose: 60 mg       Quantity: 15 tablet    Refills: 0     Outpatient Discharge Orders   Nebulizer          ED Provider  Electronically Signed by     Reece Gómez MD  12/21/24 2018

## 2024-12-27 ENCOUNTER — NURSE TRIAGE (OUTPATIENT)
Age: 69
End: 2024-12-27

## 2024-12-27 ENCOUNTER — HOSPITAL ENCOUNTER (EMERGENCY)
Facility: HOSPITAL | Age: 69
Discharge: HOME | End: 2024-12-27
Attending: STUDENT IN AN ORGANIZED HEALTH CARE EDUCATION/TRAINING PROGRAM
Payer: COMMERCIAL

## 2024-12-27 ENCOUNTER — APPOINTMENT (EMERGENCY)
Dept: RADIOLOGY | Facility: HOSPITAL | Age: 69
End: 2024-12-27
Payer: COMMERCIAL

## 2024-12-27 VITALS
HEART RATE: 89 BPM | OXYGEN SATURATION: 97 % | SYSTOLIC BLOOD PRESSURE: 142 MMHG | RESPIRATION RATE: 18 BRPM | BODY MASS INDEX: 27.49 KG/M2 | DIASTOLIC BLOOD PRESSURE: 67 MMHG | TEMPERATURE: 98 F | WEIGHT: 165 LBS | HEIGHT: 65 IN

## 2024-12-27 DIAGNOSIS — R05.9 COUGH: Primary | ICD-10-CM

## 2024-12-27 DIAGNOSIS — B33.8 RSV INFECTION: ICD-10-CM

## 2024-12-27 LAB
2HR DELTA HS TROPONIN: 0 NG/L
ALBUMIN SERPL BCG-MCNC: 3.8 G/DL (ref 3.5–5)
ALP SERPL-CCNC: 77 U/L (ref 34–104)
ALT SERPL W P-5'-P-CCNC: 30 U/L (ref 7–52)
ANION GAP SERPL CALCULATED.3IONS-SCNC: 5 MMOL/L (ref 4–13)
AST SERPL W P-5'-P-CCNC: 40 U/L (ref 13–39)
BASE EX.OXY STD BLDV CALC-SCNC: 85.3 % (ref 60–80)
BASE EXCESS BLDV CALC-SCNC: 0.4 MMOL/L
BASOPHILS # BLD AUTO: 0.04 THOUSANDS/ÂΜL (ref 0–0.1)
BASOPHILS NFR BLD AUTO: 0 % (ref 0–1)
BILIRUB SERPL-MCNC: 0.33 MG/DL (ref 0.2–1)
BUN SERPL-MCNC: 15 MG/DL (ref 5–25)
CALCIUM SERPL-MCNC: 8.5 MG/DL (ref 8.4–10.2)
CARDIAC TROPONIN I PNL SERPL HS: 7 NG/L (ref ?–50)
CARDIAC TROPONIN I PNL SERPL HS: 7 NG/L (ref ?–50)
CHLORIDE SERPL-SCNC: 103 MMOL/L (ref 96–108)
CO2 SERPL-SCNC: 26 MMOL/L (ref 21–32)
CREAT SERPL-MCNC: 0.73 MG/DL (ref 0.6–1.3)
D DIMER PPP FEU-MCNC: 0.69 UG/ML FEU
EOSINOPHIL # BLD AUTO: 0.11 THOUSAND/ÂΜL (ref 0–0.61)
EOSINOPHIL NFR BLD AUTO: 1 % (ref 0–6)
ERYTHROCYTE [DISTWIDTH] IN BLOOD BY AUTOMATED COUNT: 13.9 % (ref 11.6–15.1)
FLUAV RNA RESP QL NAA+PROBE: NEGATIVE
FLUBV RNA RESP QL NAA+PROBE: NEGATIVE
GFR SERPL CREATININE-BSD FRML MDRD: 84 ML/MIN/1.73SQ M
GLUCOSE SERPL-MCNC: 132 MG/DL (ref 65–140)
HCO3 BLDV-SCNC: 25.2 MMOL/L (ref 24–30)
HCT VFR BLD AUTO: 40.2 % (ref 34.8–46.1)
HGB BLD-MCNC: 13 G/DL (ref 11.5–15.4)
IMM GRANULOCYTES # BLD AUTO: 0.16 THOUSAND/UL (ref 0–0.2)
IMM GRANULOCYTES NFR BLD AUTO: 1 % (ref 0–2)
LYMPHOCYTES # BLD AUTO: 1.27 THOUSANDS/ÂΜL (ref 0.6–4.47)
LYMPHOCYTES NFR BLD AUTO: 10 % (ref 14–44)
MCH RBC QN AUTO: 26.7 PG (ref 26.8–34.3)
MCHC RBC AUTO-ENTMCNC: 32.3 G/DL (ref 31.4–37.4)
MCV RBC AUTO: 83 FL (ref 82–98)
MONOCYTES # BLD AUTO: 1.11 THOUSAND/ÂΜL (ref 0.17–1.22)
MONOCYTES NFR BLD AUTO: 9 % (ref 4–12)
NEUTROPHILS # BLD AUTO: 9.59 THOUSANDS/ÂΜL (ref 1.85–7.62)
NEUTS SEG NFR BLD AUTO: 79 % (ref 43–75)
NRBC BLD AUTO-RTO: 0 /100 WBCS
O2 CT BLDV-SCNC: 16 ML/DL
PCO2 BLDV: 41.3 MM HG (ref 42–50)
PH BLDV: 7.4 [PH] (ref 7.3–7.4)
PLATELET # BLD AUTO: 300 THOUSANDS/UL (ref 149–390)
PMV BLD AUTO: 9.1 FL (ref 8.9–12.7)
PO2 BLDV: 51.1 MM HG (ref 35–45)
POTASSIUM SERPL-SCNC: 5.1 MMOL/L (ref 3.5–5.3)
PROCALCITONIN SERPL-MCNC: <0.05 NG/ML
PROT SERPL-MCNC: 6.8 G/DL (ref 6.4–8.4)
RBC # BLD AUTO: 4.86 MILLION/UL (ref 3.81–5.12)
RSV RNA RESP QL NAA+PROBE: POSITIVE
SARS-COV-2 RNA RESP QL NAA+PROBE: NEGATIVE
SODIUM SERPL-SCNC: 134 MMOL/L (ref 135–147)
WBC # BLD AUTO: 12.28 THOUSAND/UL (ref 4.31–10.16)

## 2024-12-27 PROCEDURE — 84145 PROCALCITONIN (PCT): CPT | Performed by: STUDENT IN AN ORGANIZED HEALTH CARE EDUCATION/TRAINING PROGRAM

## 2024-12-27 PROCEDURE — 85025 COMPLETE CBC W/AUTO DIFF WBC: CPT | Performed by: STUDENT IN AN ORGANIZED HEALTH CARE EDUCATION/TRAINING PROGRAM

## 2024-12-27 PROCEDURE — 96361 HYDRATE IV INFUSION ADD-ON: CPT

## 2024-12-27 PROCEDURE — 85379 FIBRIN DEGRADATION QUANT: CPT | Performed by: STUDENT IN AN ORGANIZED HEALTH CARE EDUCATION/TRAINING PROGRAM

## 2024-12-27 PROCEDURE — 80053 COMPREHEN METABOLIC PANEL: CPT | Performed by: STUDENT IN AN ORGANIZED HEALTH CARE EDUCATION/TRAINING PROGRAM

## 2024-12-27 PROCEDURE — 82805 BLOOD GASES W/O2 SATURATION: CPT | Performed by: STUDENT IN AN ORGANIZED HEALTH CARE EDUCATION/TRAINING PROGRAM

## 2024-12-27 PROCEDURE — 84484 ASSAY OF TROPONIN QUANT: CPT | Performed by: STUDENT IN AN ORGANIZED HEALTH CARE EDUCATION/TRAINING PROGRAM

## 2024-12-27 PROCEDURE — 71045 X-RAY EXAM CHEST 1 VIEW: CPT

## 2024-12-27 PROCEDURE — 99285 EMERGENCY DEPT VISIT HI MDM: CPT | Performed by: STUDENT IN AN ORGANIZED HEALTH CARE EDUCATION/TRAINING PROGRAM

## 2024-12-27 PROCEDURE — 36415 COLL VENOUS BLD VENIPUNCTURE: CPT | Performed by: STUDENT IN AN ORGANIZED HEALTH CARE EDUCATION/TRAINING PROGRAM

## 2024-12-27 PROCEDURE — 99285 EMERGENCY DEPT VISIT HI MDM: CPT

## 2024-12-27 PROCEDURE — 0241U HB NFCT DS VIR RESP RNA 4 TRGT: CPT | Performed by: STUDENT IN AN ORGANIZED HEALTH CARE EDUCATION/TRAINING PROGRAM

## 2024-12-27 PROCEDURE — 96374 THER/PROPH/DIAG INJ IV PUSH: CPT

## 2024-12-27 PROCEDURE — 93005 ELECTROCARDIOGRAM TRACING: CPT

## 2024-12-27 RX ORDER — ACETAMINOPHEN 325 MG/1
975 TABLET ORAL ONCE
Status: COMPLETED | OUTPATIENT
Start: 2024-12-27 | End: 2024-12-27

## 2024-12-27 RX ORDER — ALBUTEROL SULFATE 5 MG/ML
5 SOLUTION RESPIRATORY (INHALATION) ONCE
Status: COMPLETED | OUTPATIENT
Start: 2024-12-27 | End: 2024-12-27

## 2024-12-27 RX ORDER — BENZONATATE 100 MG/1
100 CAPSULE ORAL 2 TIMES DAILY PRN
Qty: 21 CAPSULE | Refills: 0 | Status: SHIPPED | OUTPATIENT
Start: 2024-12-27

## 2024-12-27 RX ORDER — KETOROLAC TROMETHAMINE 30 MG/ML
15 INJECTION, SOLUTION INTRAMUSCULAR; INTRAVENOUS ONCE
Status: COMPLETED | OUTPATIENT
Start: 2024-12-27 | End: 2024-12-27

## 2024-12-27 RX ORDER — AMOXICILLIN 500 MG/1
1000 CAPSULE ORAL EVERY 24 HOURS
Qty: 10 CAPSULE | Refills: 0 | Status: SHIPPED | OUTPATIENT
Start: 2024-12-28 | End: 2025-01-02

## 2024-12-27 RX ADMIN — SODIUM CHLORIDE 1000 ML: 0.9 INJECTION, SOLUTION INTRAVENOUS at 12:01

## 2024-12-27 RX ADMIN — KETOROLAC TROMETHAMINE 15 MG: 30 INJECTION, SOLUTION INTRAMUSCULAR; INTRAVENOUS at 14:30

## 2024-12-27 RX ADMIN — ALBUTEROL SULFATE 5 MG: 2.5 SOLUTION RESPIRATORY (INHALATION) at 12:00

## 2024-12-27 RX ADMIN — ACETAMINOPHEN 975 MG: 325 TABLET ORAL at 14:30

## 2024-12-27 RX ADMIN — IPRATROPIUM BROMIDE 0.5 MG: 0.5 SOLUTION RESPIRATORY (INHALATION) at 12:00

## 2024-12-27 NOTE — ED PROVIDER NOTES
Time reflects when diagnosis was documented in both MDM as applicable and the Disposition within this note       Time User Action Codes Description Comment    12/27/2024  2:50 PM Jasper Negron Add [R05.9] Cough     12/27/2024  2:50 PM Jasper Negron Add [B33.8] RSV infection           ED Disposition       ED Disposition   Discharge    Condition   Stable    Date/Time   Fri Dec 27, 2024  2:50 PM    Comment   Ina Tolbert discharge to home/self care.                   Assessment & Plan       Medical Decision Making  Patient is a 69 y.o. female who presents to the ED for cough.  Patient is nontoxic, well-appearing.     Differential includes but is not limited to: Viral URI, pneumonia.  Doubt ACS.    Plan: Labs, trial breathing treatment, chest x-ray, viral testing, reassess                   Amount and/or Complexity of Data Reviewed  Labs: ordered. Decision-making details documented in ED Course.  Radiology: ordered and independent interpretation performed.    Risk  OTC drugs.  Prescription drug management.        ED Course as of 12/27/24 1627   Fri Dec 27, 2024   1244 RSV PCR(!): Positive   1452 Reevaluated.  Resting comfortably.  Vital stable.  Negative delta troponin.  Years criteria negative D-dimer (in addition patient with recent PE study that was negative).  Discussed results and findings.  Discussed plans for discharge.  Return precautions discussed.  Patient verbalized understanding and agreed to plan of care.       Medications   sodium chloride 0.9 % bolus 1,000 mL (0 mL Intravenous Stopped 12/27/24 1507)   albuterol inhalation solution 5 mg (5 mg Nebulization Given 12/27/24 1200)   ipratropium (ATROVENT) 0.02 % inhalation solution 0.5 mg (0.5 mg Nebulization Given 12/27/24 1200)   ketorolac (TORADOL) injection 15 mg (15 mg Intravenous Given 12/27/24 1430)   acetaminophen (TYLENOL) tablet 975 mg (975 mg Oral Given 12/27/24 1430)       ED Risk Strat Scores   HEART Risk Score      Flowsheet Row Most Recent  Value   Heart Score Risk Calculator    History 0 Filed at: 12/27/2024 1625   ECG 0 Filed at: 12/27/2024 1625   Age 2 Filed at: 12/27/2024 1625   Risk Factors 1 Filed at: 12/27/2024 1625   Troponin 0 Filed at: 12/27/2024 1625   HEART Score 3 Filed at: 12/27/2024 1625          HEART Risk Score      Flowsheet Row Most Recent Value   Heart Score Risk Calculator    History 0 Filed at: 12/27/2024 1625   ECG 0 Filed at: 12/27/2024 1625   Age 2 Filed at: 12/27/2024 1625   Risk Factors 1 Filed at: 12/27/2024 1625   Troponin 0 Filed at: 12/27/2024 1625   HEART Score 3 Filed at: 12/27/2024 1625                            SBIRT 22yo+      Flowsheet Row Most Recent Value   Initial Alcohol Screen: US AUDIT-C     1. How often do you have a drink containing alcohol? 0 Filed at: 12/27/2024 1024   2. How many drinks containing alcohol do you have on a typical day you are drinking?  0 Filed at: 12/27/2024 1024   3a. Male UNDER 65: How often do you have five or more drinks on one occasion? 0 Filed at: 12/27/2024 1024   3b. FEMALE Any Age, or MALE 65+: How often do you have 4 or more drinks on one occassion? 0 Filed at: 12/27/2024 1024   Audit-C Score 0 Filed at: 12/27/2024 1024   SHARON: How many times in the past year have you...    Used an illegal drug or used a prescription medication for non-medical reasons? Never Filed at: 12/27/2024 1024                            History of Present Illness       Chief Complaint   Patient presents with    Cough     Cough since last week, here last Saturday and given antibiotics and prednisone that finished yesterday, does not feel any better       Past Medical History:   Diagnosis Date    Abdominal adhesions     Alcoholism (HCC) 1996    in remission    Anesthesia complication     difficult to wake up    Anxiety     Arthritis     Asthma     with exertion    Cancer (HCC)     melanoma    Colon polyp     Crohn's disease (HCC)     Depression     Depression     Disease of thyroid gland     Fibromyalgia      Fibromyalgia, primary     Fractures 2006    GERD (gastroesophageal reflux disease)     History of cholecystectomy 09/07/2019    Hyperlipidemia     Infected tooth 01/2024    Tooth #14-was treated with antibiotics-endodontal appt 2/1 may need root canal    Irregular heart beat     can be tachy; also has orthoststic hypotension    Irritable bowel syndrome 1990    Lazy eye     resolved: 3/27/17    Medical clearance for psychiatric admission 07/13/2021    Melanoma ([facility]) 2010    Mild asthma 11/04/2020    Osteoarthritis 07/2018    Osteopenia     with joint pain-elevated DEV    Polymyalgia (HCC) 2/10/2022    Psychiatric disorder     Shortness of breath     assoc with tachycardia    Stomach disorder 1995    Tinnitus     Wears glasses     for reading      Past Surgical History:   Procedure Laterality Date    ABDOMINAL SURGERY      lysis of adhesions x 2    APPENDECTOMY      CERVICAL FUSION      May 5,2021 and Jan. 01,2020    CHOLECYSTECTOMY      open    COLONOSCOPY      DILATION AND CURETTAGE OF UTERUS      FOOT SURGERY  05/2017    NECK SURGERY  01/2019 5/2021    NEUROMA EXCISION Right 05/26/2017    Procedure: EXCISION MASS / FIBROMA FOOT;  Surgeon: Jorden Crespo DPM;  Location: [facility] MAIN OR;  Service:     PARS PLANA VITRECTOMY W/ REPAIR OF MACULAR HOLE  12/23/2020    MD ARTHRP KNE CONDYLE&PLATU MEDIAL&LAT COMPARTMENTS Right 2/6/2024    Procedure: ARTHROPLASTY KNEE TOTAL W ROBOT - RIGHT - PRESS FIT - OVERNIGHT;  Surgeon: Jairon Kim DO;  Location: [facility];  Service: Orthopedics    MD XCAPSL CTRC RMVL INSJ IO LENS PROSTH W/O ECP Left 12/06/2021    Procedure: EXTRACTION EXTRACAPSULAR CATARACT PHACO INTRAOCULAR LENS (IOL);  Surgeon: Mandeep Chavez MD;  Location: [facility] MAIN OR;  Service: Ophthalmology    RIGHT OOPHORECTOMY      UPPER GASTROINTESTINAL ENDOSCOPY  5/2019    WISDOM TOOTH EXTRACTION      x4      Family History   Problem Relation Age of Onset    Heart disease Mother     Stroke Mother 62    COPD Mother      Arthritis Mother     Asthma Mother     Hypertension Father     Dislocations Sister     Multiple sclerosis Sister     Neurological problems Sister     Scoliosis Sister     Depression Sister     Mental illness Sister     No Known Problems Maternal Grandmother     No Known Problems Maternal Grandfather     No Known Problems Paternal Grandmother     No Known Problems Paternal Grandfather     Kidney disease Brother         kidney transplant    No Known Problems Maternal Aunt     No Known Problems Maternal Uncle     No Known Problems Paternal Aunt     No Known Problems Paternal Uncle     ADD / ADHD Neg Hx     Anesthesia problems Neg Hx     Cancer Neg Hx     Clotting disorder Neg Hx     Collagen disease Neg Hx     Diabetes Neg Hx     Dislocations Neg Hx     Learning disabilities Neg Hx     Neurological problems Neg Hx     Osteoporosis Neg Hx     Rheumatologic disease Neg Hx     Scoliosis Neg Hx     Vascular Disease Neg Hx       Social History     Tobacco Use    Smoking status: Never     Passive exposure: Never    Smokeless tobacco: Never   Vaping Use    Vaping status: Never Used   Substance Use Topics    Alcohol use: Not Currently    Drug use: No      E-Cigarette/Vaping    E-Cigarette Use Never User       E-Cigarette/Vaping Substances    Nicotine No     THC No     CBD No     Flavoring No     Other No     Unknown No       I have reviewed and agree with the history as documented.     69-year-old female, past medical history including asthma, anxiety, fibromyalgia, depression, Crohn's, hyperlipidemia, who presents to the emergency department for persistent cough.  Patient was seen here several days ago and diagnosed with acute bronchitis.  She was started on steroids and azithromycin.  She reports finishing these with a cough.  No other modifying factors.  Associated with some chest discomfort with coughing.  No shortness of breath at rest.  No fevers.  No other complaints or concerns.      Cough      Review of Systems    Respiratory:  Positive for cough.    All other systems reviewed and are negative.          Objective       ED Triage Vitals   Temperature Pulse Blood Pressure Respirations SpO2 Patient Position - Orthostatic VS   12/27/24 1024 12/27/24 1026 12/27/24 1026 12/27/24 1024 12/27/24 1026 --   97.6 °F (36.4 °C) (!) 119 113/68 20 96 %       Temp Source Heart Rate Source BP Location FiO2 (%) Pain Score    12/27/24 1500 -- -- -- 12/27/24 1024    Oral    5      Vitals      Date and Time Temp Pulse SpO2 Resp BP Pain Score FACES Pain Rating User   12/27/24 1500 98 °F (36.7 °C) 89 97 % 18 142/67 -- -- SF   12/27/24 1457 -- -- -- -- -- 3 -- SF   12/27/24 1430 -- -- -- -- -- 5 -- SF   12/27/24 1026 -- 119 96 % -- 113/68 -- -- MIRIAM   12/27/24 1024 97.6 °F (36.4 °C) -- -- 20 -- 5 -- MIRIAM            Physical Exam  Vitals and nursing note reviewed.   Constitutional:       General: She is not in acute distress.     Appearance: She is well-developed. She is not ill-appearing, toxic-appearing or diaphoretic.   HENT:      Head: Normocephalic and atraumatic.      Right Ear: External ear normal.      Left Ear: External ear normal.      Nose: Nose normal.   Eyes:      General: Lids are normal. No scleral icterus.  Cardiovascular:      Rate and Rhythm: Regular rhythm. Tachycardia present.      Heart sounds: Normal heart sounds. No murmur heard.     No friction rub. No gallop.   Pulmonary:      Effort: Pulmonary effort is normal. No respiratory distress.      Breath sounds: Normal breath sounds. No wheezing or rales.   Abdominal:      Palpations: Abdomen is soft.      Tenderness: There is no abdominal tenderness. There is no guarding or rebound.   Musculoskeletal:         General: No deformity. Normal range of motion.      Cervical back: Normal range of motion and neck supple.   Skin:     General: Skin is warm and dry.   Neurological:      General: No focal deficit present.      Mental Status: She is alert.   Psychiatric:         Mood and Affect:  Mood normal.         Behavior: Behavior normal.         Results Reviewed       Procedure Component Value Units Date/Time    HS Troponin I 2hr [[id number]]  (Normal) Collected: 12/27/24 1413    Lab Status: Final result Specimen: Blood from Arm, Right Updated: 12/27/24 1443     hs TnI 2hr 7 ng/L      Delta 2hr hsTnI 0 ng/L     Procalcitonin [[id number]]  (Normal) Collected: 12/27/24 1154    Lab Status: Final result Specimen: Blood from Arm, Right Updated: 12/27/24 1255     Procalcitonin <0.05 ng/ml     FLU/RSV/COVID - if FLU/RSV clinically relevant (2hr TAT) [[id number]]  (Abnormal) Collected: 12/27/24 1154    Lab Status: Final result Specimen: Nares from Nose Updated: 12/27/24 1241     SARS-CoV-2 Negative     INFLUENZA A PCR Negative     INFLUENZA B PCR Negative     RSV PCR Positive    Narrative:      This test has been performed using the CoV-2/Flu/RSV plus assay on the [employer] platform. This test has been validated by the  and verified by the performing laboratory.     This test is designed to amplify and detect the following: nucleocapsid (N), envelope (E), and RNA-dependent RNA polymerase (RdRP) genes of the SARS-CoV-2 genome; matrix (M), basic polymerase (PB2), and acidic protein (PA) segments of the influenza A genome; matrix (M) and non-structural protein (NS) segments of the influenza B genome, and the nucleocapsid genes of RSV A and RSV B.     Positive results are indicative of the presence of Flu A, Flu B, RSV, and/or SARS-CoV-2 RNA. Positive results for SARS-CoV-2 or suspected novel influenza should be reported to state, local, or federal health departments according to local reporting requirements.      All results should be assessed in conjunction with clinical presentation and other laboratory markers for clinical management.     FOR PEDIATRIC PATIENTS - copy/paste COVID Guidelines URL to browser: [email or web address]       HS  Troponin 0hr (reflex protocol) [[id number]]  (Normal) Collected: 12/27/24 1154    Lab Status: Final result Specimen: Blood from Arm, Right Updated: 12/27/24 1233     hs TnI 0hr 7 ng/L     Comprehensive metabolic panel [[id number]]  (Abnormal) Collected: 12/27/24 1154    Lab Status: Final result Specimen: Blood from Arm, Right Updated: 12/27/24 1229     Sodium 134 mmol/L      Potassium 5.1 mmol/L      Chloride 103 mmol/L      CO2 26 mmol/L      ANION GAP 5 mmol/L      BUN 15 mg/dL      Creatinine 0.73 mg/dL      Glucose 132 mg/dL      Calcium 8.5 mg/dL      AST 40 U/L      ALT 30 U/L      Alkaline Phosphatase 77 U/L      Total Protein 6.8 g/dL      Albumin 3.8 g/dL      Total Bilirubin 0.33 mg/dL      eGFR 84 ml/min/1.73sq m     Narrative:      National Kidney Disease Foundation guidelines for Chronic Kidney Disease (CKD):     Stage 1 with normal or high GFR (GFR > 90 mL/min/1.73 square meters)    Stage 2 Mild CKD (GFR = 60-89 mL/min/1.73 square meters)    Stage 3A Moderate CKD (GFR = 45-59 mL/min/1.73 square meters)    Stage 3B Moderate CKD (GFR = 30-44 mL/min/1.73 square meters)    Stage 4 Severe CKD (GFR = 15-29 mL/min/1.73 square meters)    Stage 5 End Stage CKD (GFR <15 mL/min/1.73 square meters)  Note: GFR calculation is accurate only with a steady state creatinine    D-dimer, quantitative [[id number]]  (Abnormal) Collected: 12/27/24 1154    Lab Status: Final result Specimen: Blood from Arm, Right Updated: 12/27/24 1229     D-Dimer, Quant 0.69 ug/ml FEU     Narrative:      In the evaluation for possible pulmonary embolism, in the appropriate (Well's Score of 4 or less) patient, the age adjusted d-dimer cutoff for this patient can be calculated as:    Age x 0.01 (in ug/mL) for Age-adjusted D-dimer exclusion threshold for a patient over 50 years.    Blood gas, venous [[id number]]  (Abnormal) Collected: 12/27/24 1159    Lab Status: Final result Specimen: Blood from Arm, Right Updated: 12/27/24 1215     pH, [place] 7.403      pCO2, [place] 41.3 mm Hg      pO2, [place] 51.1 mm Hg      HCO3, [place] 25.2 mmol/L      Base Excess, [place] 0.4 mmol/L      O2 Content, [place] 16.0 ml/dL      O2 HGB, VENOUS 85.3 %     CBC and differential [[id number]]  (Abnormal) Collected: 12/27/24 1154    Lab Status: Final result Specimen: Blood from Arm, Right Updated: 12/27/24 1159     WBC 12.28 Thousand/uL      RBC 4.86 Million/uL      Hemoglobin 13.0 g/dL      Hematocrit 40.2 %      MCV 83 fL      MCH 26.7 pg      MCHC 32.3 g/dL      RDW 13.9 %      MPV 9.1 fL      Platelets 300 Thousands/uL      nRBC 0 /100 WBCs      Segmented % 79 %      Immature Grans % 1 %      Lymphocytes % 10 %      Monocytes % 9 %      Eosinophils Relative 1 %      Basophils Relative 0 %      Absolute Neutrophils 9.59 Thousands/µL      Absolute Immature Grans 0.16 Thousand/uL      Absolute Lymphocytes 1.27 Thousands/µL      Absolute Monocytes 1.11 Thousand/µL      Eosinophils Absolute 0.11 Thousand/µL      Basophils Absolute 0.04 Thousands/µL             XR chest 1 view portable   ED Interpretation by Jasper Negron DO (12/27 1224)   No acute cardiopulmonary disease      Final Interpretation by Mani Mendiola MD (12/27 1410)      No acute cardiopulmonary disease.            Workstation performed: [id number]             ECG 12 Lead Documentation Only    Date/Time: 12/27/2024 4:22 PM    Performed by: Jasper Negron DO  Authorized by: Jasper Negron DO    ECG reviewed by me, the ED Provider: yes    Patient location:  ED  Interpretation:     Interpretation: normal    Rate:     ECG rate:  100    ECG rate assessment: tachycardic    Rhythm:     Rhythm: sinus rhythm and sinus tachycardia    Ectopy:     Ectopy: none    QRS:     QRS axis:  Normal  Conduction:     Conduction: normal    ST segments:     ST segments:  Normal  T waves:     T waves: normal        ED Medication and Procedure Management   Prior to Admission Medications   Prescriptions Last Dose Informant Patient Reported? Taking?    Calcium Carb-Cholecalciferol 600-12.5 MG-MCG CAPS  Self Yes No   Sig: Take by mouth daily in the early morning   DULoxetine (CYMBALTA) 60 mg delayed release capsule   No No   Sig: TAKE 1 CAPSULE BY MOUTH EVERY DAY   Denta 5000 Plus 1.1 % CREA  Self Yes No   Sig: USE ONCE DAILY AT NIGHT   Fluticasone-Salmeterol (Wixela Inhub) 100-50 mcg/dose inhaler   No No   Sig: Inhale 1 puff 2 (two) times a day Rinse mouth after use.   QUEtiapine (SEROquel) 100 mg tablet  Self No No   Sig: TAKE 1 TABLET BY MOUTH DAILY AT BEDTIME   albuterol (2.5 mg/3 mL) 0.083 % nebulizer solution   No No   Sig: Take 3 mL (2.5 mg total) by nebulization every 6 (six) hours as needed for wheezing or shortness of breath   albuterol (PROVENTIL HFA,VENTOLIN HFA) 90 mcg/act inhaler   No No   Sig: Inhale 2 puffs every 6 (six) hours as needed for wheezing or shortness of breath   albuterol (ProAir HFA) 90 mcg/act inhaler  Self No No   Sig: Inhale 2 puffs every 6 (six) hours as needed for wheezing   docusate sodium (COLACE) 100 mg capsule  Self No No   Sig: Take 1 capsule (100 mg total) by mouth 2 (two) times a day   esomeprazole (NexIUM) 40 MG capsule   No No   Sig: TAKE 1 CAPSULE BY MOUTH EVERY DAY BEFORE BREAKFAST   famotidine (PEPCID) 20 mg tablet  Self No No   Sig: Take 1 tablet (20 mg total) by mouth daily as needed for heartburn   Patient not taking: Reported on 11/11/2024   gabapentin (NEURONTIN) 300 mg capsule  Self No No   Sig: Take 1 capsule (300 mg total) by mouth 3 (three) times a day   levothyroxine 50 mcg tablet  Self No No   Sig: TAKE 1 TABLET BY MOUTH EVERY DAY   methylPREDNISolone 4 MG tablet therapy pack  Self No No   Sig: Use as directed on package   Patient not taking: Reported on 11/11/2024   midodrine (PROAMATINE) 5 mg tablet  Self Yes No   Sig: Take 5 mg by mouth daily   predniSONE 20 mg tablet   No No   Sig: Take 3 tablets (60 mg total) by mouth daily for 5 days      Facility-Administered Medications: None     Discharge  Medication List as of 12/27/2024  3:11 PM        START taking these medications    Details   amoxicillin (AMOXIL) 500 mg capsule Take 2 capsules (1,000 mg total) by mouth every 24 hours for 5 days Do not start before December 28, 2024., Starting Sat 12/28/2024, Until Thu 1/2/2025, Normal      benzonatate (TESSALON PERLES) 100 mg capsule Take 1 capsule (100 mg total) by mouth 2 (two) times a day as needed for cough, Starting Fri 12/27/2024, Normal           CONTINUE these medications which have NOT CHANGED    Details   albuterol (2.5 mg/3 mL) 0.083 % nebulizer solution Take 3 mL (2.5 mg total) by nebulization every 6 (six) hours as needed for wheezing or shortness of breath, Starting Sat 12/21/2024, Normal      !! albuterol (ProAir HFA) 90 mcg/act inhaler Inhale 2 puffs every 6 (six) hours as needed for wheezing, Starting Thu 8/8/2024, Normal      !! albuterol (PROVENTIL HFA,VENTOLIN HFA) 90 mcg/act inhaler Inhale 2 puffs every 6 (six) hours as needed for wheezing or shortness of breath, Starting Sat 12/21/2024, Print      Calcium Carb-Cholecalciferol 600-12.5 MG-MCG CAPS Take by mouth daily in the early morning, Historical Med      Denta 5000 Plus 1.1 % CREA USE ONCE DAILY AT NIGHT, Historical Med      docusate sodium (COLACE) 100 mg capsule Take 1 capsule (100 mg total) by mouth 2 (two) times a day, Starting Wed 2/7/2024, Normal      DULoxetine (CYMBALTA) 60 mg delayed release capsule TAKE 1 CAPSULE BY MOUTH EVERY DAY, Starting Mon 11/11/2024, Normal      esomeprazole (NexIUM) 40 MG capsule TAKE 1 CAPSULE BY MOUTH EVERY DAY BEFORE BREAKFAST, Starting Thu 12/5/2024, Normal      famotidine (PEPCID) 20 mg tablet Take 1 tablet (20 mg total) by mouth daily as needed for heartburn, Starting Fri 4/19/2024, Normal      Fluticasone-Salmeterol (Wixela Inhub) 100-50 mcg/dose inhaler Inhale 1 puff 2 (two) times a day Rinse mouth after use., Starting Mon 11/11/2024, Normal      gabapentin (NEURONTIN) 300 mg capsule Take 1  capsule (300 mg total) by mouth 3 (three) times a day, Starting Fri 7/12/2024, Normal      levothyroxine 50 mcg tablet TAKE 1 TABLET BY MOUTH EVERY DAY, Normal      methylPREDNISolone 4 MG tablet therapy pack Use as directed on package, Normal      midodrine (PROAMATINE) 5 mg tablet Take 5 mg by mouth daily, Starting Mon 4/29/2024, Historical Med      QUEtiapine (SEROquel) 100 mg tablet TAKE 1 TABLET BY MOUTH DAILY AT BEDTIME, Starting Mon 10/14/2024, Normal       !! - Potential duplicate medications found. Please discuss with provider.        STOP taking these medications       predniSONE 20 mg tablet Comments:   Reason for Stopping:             No discharge procedures on file.  ED SEPSIS DOCUMENTATION   Time reflects when diagnosis was documented in both MDM as applicable and the Disposition within this note       Time User Action Codes Description Comment    12/27/2024  2:50 PM Jasper Negron [R05.9] Cough     12/27/2024  2:50 PM Jasper Negron [B33.8] RSV infection                  Jasper Negron,   12/27/24 8223

## 2024-12-27 NOTE — TELEPHONE ENCOUNTER
"Reason for Disposition   MODERATE difficulty breathing (e.g., speaks in phrases, SOB even at rest, pulse 100-120) of new-onset or worse than normal    Answer Assessment - Initial Assessment Questions  1. RESPIRATORY STATUS: \"Describe your breathing?\" (e.g., wheezing, shortness of breath, unable to speak, severe coughing)       SOB,SEVERE COUGHING   2. ONSET: \"When did this breathing problem begin?\"       12/21  3. PATTERN \"Does the difficult breathing come and go, or has it been constant since it started?\"       CONSTANT  4. SEVERITY: \"How bad is your breathing?\" (e.g., mild, moderate, severe)       SEVERE  5. RECURRENT SYMPTOM: \"Have you had difficulty breathing before?\" If Yes, ask: \"When was the last time?\" and \"What happened that time?\"       NO   6. CARDIAC HISTORY: \"Do you have any history of heart disease?\" (e.g., heart attack, angina, bypass surgery, angioplasty)       HYPERTENSION   7. LUNG HISTORY: \"Do you have any history of lung disease?\"  (e.g., pulmonary embolus, asthma, emphysema)      ASTHMA   8. CAUSE: \"What do you think is causing the breathing problem?\"       POSSIBLE Acute bronchitis   9. OTHER SYMPTOMS: \"Do you have any other symptoms?\" (e.g., chest pain, cough, dizziness, fever, runny nose)      SEVERE COUGH,DENIES CHEST PAIN   10. O2 SATURATION MONITOR:  \"Do you use an oxygen saturation monitor (pulse oximeter) at home?\" If Yes, ask: \"What is your reading (oxygen level) today?\" \"What is your usual oxygen saturation reading?\" (e.g., 95%)        UNKNOWN   11. PREGNANCY: \"Is there any chance you are pregnant?\" \"When was your last menstrual period?\"        NO  12. TRAVEL: \"Have you traveled out of the country in the last month?\" (e.g., travel history, exposures)        NO  Patient called with continued worsening symptoms severe dry cough,sob and wheezing ,talking in single phrases  Patient was seen at the ER on 12/21 diagnosed with  Acute bronchitis ,patient was ordered  albuterol (2.5 mg/3 mL) " 0.083 % nebulizer solution,albuterol (PROVENTIL HFA,VENTOLIN HFA) 90 mcg/act inhaler and prednisone 20 mg tablets,patient stated the symptoms have gotten worse medication gave no relieve of symptoms   Patient advised to be evaluated at the ER        predniSONE 20 mg tablet    Protocols used: Breathing Difficulty-Adult-[place]

## 2024-12-27 NOTE — DISCHARGE INSTRUCTIONS
You were seen in the emergency department for cough.  As discussed please follow with your family doctor.  Return to the emergency room for any worsening cough, shortness of breath, fevers uncontrolled medications, or for any other new or concerning symptoms.

## 2024-12-28 LAB
ATRIAL RATE: 100 BPM
P AXIS: 73 DEGREES
PR INTERVAL: 150 MS
QRS AXIS: -18 DEGREES
QRSD INTERVAL: 78 MS
QT INTERVAL: 344 MS
QTC INTERVAL: 443 MS
T WAVE AXIS: 67 DEGREES
VENTRICULAR RATE: 100 BPM

## 2024-12-28 PROCEDURE — 93010 ELECTROCARDIOGRAM REPORT: CPT | Performed by: INTERNAL MEDICINE

## 2024-12-30 ENCOUNTER — OFFICE VISIT (OUTPATIENT)
Dept: PSYCHIATRY | Facility: CLINIC | Age: 69
End: 2024-12-30
Payer: COMMERCIAL

## 2024-12-30 VITALS
DIASTOLIC BLOOD PRESSURE: 81 MMHG | HEIGHT: 65 IN | OXYGEN SATURATION: 95 % | HEART RATE: 88 BPM | BODY MASS INDEX: 27.82 KG/M2 | WEIGHT: 167 LBS | SYSTOLIC BLOOD PRESSURE: 107 MMHG

## 2024-12-30 DIAGNOSIS — Z63.0 MARITAL CONFLICT: ICD-10-CM

## 2024-12-30 DIAGNOSIS — F63.0 GAMBLING DISORDER, MODERATE: ICD-10-CM

## 2024-12-30 DIAGNOSIS — F41.1 GENERALIZED ANXIETY DISORDER: ICD-10-CM

## 2024-12-30 DIAGNOSIS — F33.41 MAJOR DEPRESSIVE DISORDER, RECURRENT, IN PARTIAL REMISSION (HCC): Primary | ICD-10-CM

## 2024-12-30 DIAGNOSIS — F51.01 PRIMARY INSOMNIA: ICD-10-CM

## 2024-12-30 PROCEDURE — 90833 PSYTX W PT W E/M 30 MIN: CPT | Performed by: NURSE PRACTITIONER

## 2024-12-30 PROCEDURE — 99214 OFFICE O/P EST MOD 30 MIN: CPT | Performed by: NURSE PRACTITIONER

## 2024-12-30 SDOH — SOCIAL STABILITY - SOCIAL INSECURITY: PROBLEMS IN RELATIONSHIP WITH SPOUSE OR PARTNER: Z63.0

## 2024-12-30 NOTE — ASSESSMENT & PLAN NOTE
Cymbalta 60 mg delayed release capsule    Ina reports depression has cleared PHQ-9 score of 1 indicates no depression or suicidal ideation.  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  She reports no further gambling or drinking.  Takes medications as prescribed and family are supportive.

## 2024-12-30 NOTE — ASSESSMENT & PLAN NOTE
Ina denies anxiety coping and has improved.  Safety plan updated, she is reading more books.  Recently she was very sick with RSV and coughing has just subsided.  She enjoyed the holidays however her step daughter took her youngest granddaughter and her  to [place] and will be back today.  Support was provided denies panic she is eating and sleeping well

## 2024-12-30 NOTE — ASSESSMENT & PLAN NOTE
Seroquel 100 mg    At night is effective to help her sleep and helps with her anxiety.  Aims is negative.

## 2024-12-30 NOTE — ASSESSMENT & PLAN NOTE
Ina reports getting along better with her .  She realizes some issues will never change and this is what his person is like.  She reports it is improving and he is helping out financially.

## 2024-12-30 NOTE — PSYCH
Visit Time    Visit Start Time: 10:45  Visit Stop Time: 11:15  Total Visit Duration:  30 minutes    Subjective:     Patient ID: Ina Tolbert is a 69 y.o. female.  History of anxiety, depression, gambling and alcohol abuse seen for medication management and mood assessment.  Assessment & Plan  Major depressive disorder, recurrent, in partial remission (HCC)  Cymbalta 60 mg delayed release capsule    Ina reports depression has cleared PHQ-9 score of 1 indicates no depression or suicidal ideation.  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  She reports no further gambling or drinking.  Takes medications as prescribed and family are supportive.         Generalized anxiety disorder  Ina denies anxiety coping and has improved.  Safety plan updated, she is reading more books.  Recently she was very sick with RSV and coughing has just subsided.  She enjoyed the holidays however her step daughter took her youngest granddaughter and her  to [place] and will be back today.  Support was provided denies panic she is eating and sleeping well         Gambling disorder, moderate  No further gambling         Marital conflict  Ina reports getting along better with her .  She realizes some issues will never change and this is what his person is like.  She reports it is improving and he is helping out financially.         Primary insomnia  Seroquel 100 mg    At night is effective to help her sleep and helps with her anxiety.  Aims is negative.            HPI ROS Appetite Changes and Sleep: normal appetite and normal energy level    Review Of Systems:     Mood Normal   Behavior Normal    Thought Content Normal   General Relationship Problems, Emotional Problems, and Sleep Disturbances   Personality Normal   Other Psych Symptoms Normal   Constitutional As Noted in HPI   ENT As Noted in HPI   Cardiovascular As Noted in HPI   Respiratory As Noted in HPI    Gastrointestinal As Noted in HPI   Genitourinary As Noted in HPI   Musculoskeletal As Noted in HPI   Integumentary As Noted in HPI   Neurological As Noted in HPI   Endocrine hypothyroid   Other Symptoms Normal        Laboratory Results:     Substance Abuse History:  Social History     Substance and Sexual Activity   Drug Use No       Family Psychiatric History:   Family History   Problem Relation Age of Onset    Heart disease Mother     Stroke Mother 62    COPD Mother     Arthritis Mother     Asthma Mother     Hypertension Father     Dislocations Sister     Multiple sclerosis Sister     Neurological problems Sister     Scoliosis Sister     Depression Sister     Mental illness Sister     No Known Problems Maternal Grandmother     No Known Problems Maternal Grandfather     No Known Problems Paternal Grandmother     No Known Problems Paternal Grandfather     Kidney disease Brother         kidney transplant    No Known Problems Maternal Aunt     No Known Problems Maternal Uncle     No Known Problems Paternal Aunt     No Known Problems Paternal Uncle     ADD / ADHD Neg Hx     Anesthesia problems Neg Hx     Cancer Neg Hx     Clotting disorder Neg Hx     Collagen disease Neg Hx     Diabetes Neg Hx     Dislocations Neg Hx     Learning disabilities Neg Hx     Neurological problems Neg Hx     Osteoporosis Neg Hx     Rheumatologic disease Neg Hx     Scoliosis Neg Hx     Vascular Disease Neg Hx        The following portions of the patient's history were reviewed and updated as appropriate: allergies, current medications, past family history, past medical history, past social history, past surgical history, and problem list.    Social History     Socioeconomic History    Marital status: /Civil Union     Spouse name: Not on file    Number of children: Not on file    Years of education: Not on file    Highest education level: Not on file   Occupational History    Not on file   Tobacco Use    Smoking status: Never      Passive exposure: Never    Smokeless tobacco: Never   Vaping Use    Vaping status: Never Used   Substance and Sexual Activity    Alcohol use: Not Currently    Drug use: No    Sexual activity: Not Currently     Partners: Male   Other Topics Concern    Not on file   Social History Narrative    Not on file     Social Drivers of Health     Financial Resource Strain: Not on file   Food Insecurity: No Food Insecurity (6/18/2024)    Nursing - Inadequate Food Risk Classification     Worried About Running Out of Food in the Last Year: Never true     Ran Out of Food in the Last Year: Never true     Ran Out of Food in the Last Year: Not on file   Transportation Needs: No Transportation Needs (6/18/2024)    PRAPARE - Transportation     Lack of Transportation (Medical): No     Lack of Transportation (Non-Medical): No   Physical Activity: Not on file   Stress: Not on file   Social Connections: Not on file   Intimate Partner Violence: Not on file   Housing Stability: Low Risk  (6/18/2024)    Housing Stability Vital Sign     Unable to Pay for Housing in the Last Year: No     Number of Times Moved in the Last Year: 0     Homeless in the Last Year: No     Social History     Social History Narrative    Not on file       Objective:     Mental status:  Appearance calm and cooperative , adequate hygiene and grooming, and good eye contact    Mood euthymic and mood appropriate   Affect affect appropriate    Speech a normal rate   Thought Processes normal thought processes   Hallucinations no hallucinations present    Thought Content no delusions   Abnormal Thoughts no suicidal thoughts  and no homicidal thoughts    Orientation  oriented to person and place and time   Remote Memory short term memory intact   Attention Span concentration intact   Intellect Appears to be of Average Intelligence   Insight Insight intact   Judgement judgment was intact   Muscle Strength Muscle strength and tone were normal   Language no difficulty naming common  "objects   Fund of Knowledge displays adequate knowledge of current events   Pain moderate to severe   Pain Scale 5       Assessment/Plan:       Diagnoses and all orders for this visit:    Major depressive disorder, recurrent, in partial remission (HCC)    Generalized anxiety disorder    Gambling disorder, moderate    Marital conflict          Treatment Recommendations- Risks Benefits      Immediate Medical/Psychiatric/Psychotherapy Treatments and Any Precautions: Continue treat and plan    Risks, Benefits And Possible Side Effects Of Medications:  {PSYCH RISK, BENEFITS AND POSSIBLE SIDE EFFECTS (Optional):42446     Psychotherapy Provided: 30 minutes individual psychotherapy provided.   Supportive therapy  Medication evaluation  Mood assessment  Surveys reviewed and confirmed  Normalized and validated her feelings  Safety plan updated   Depression Follow-up Plan Completed: Not applicable    Goals discussed in session: Maintain stable mood  \"Portions of the record may have been created with voice recognition software. Occasional wrong word or \"sound a like\" substitutions may have occurred due to the inherent limitations of voice recognition software. Read the chart carefully and recognize, using context, where substitutions have occurred. Please call if you have any questions. \"                                       "

## 2025-01-02 DIAGNOSIS — E03.8 OTHER SPECIFIED HYPOTHYROIDISM: ICD-10-CM

## 2025-01-03 RX ORDER — LEVOTHYROXINE SODIUM 50 UG/1
50 TABLET ORAL DAILY
Qty: 90 TABLET | Refills: 1 | Status: SHIPPED | OUTPATIENT
Start: 2025-01-03

## 2025-01-10 ENCOUNTER — TELEPHONE (OUTPATIENT)
Age: 70
End: 2025-01-10

## 2025-01-10 NOTE — TELEPHONE ENCOUNTER
I lvm for pt to call the office to confirm the 01/17 appt I also updated pt with office address

## 2025-01-10 NOTE — TELEPHONE ENCOUNTER
Pt called in needing to r/s appt due to new work schedule.   R/s to 4/7/25 @ 4:00 with Ross and added pt to wait list.

## 2025-01-18 DIAGNOSIS — F33.41 MAJOR DEPRESSIVE DISORDER, RECURRENT, IN PARTIAL REMISSION (HCC): ICD-10-CM

## 2025-01-20 ENCOUNTER — OFFICE VISIT (OUTPATIENT)
Age: 70
End: 2025-01-20
Payer: COMMERCIAL

## 2025-01-20 VITALS
HEIGHT: 65 IN | WEIGHT: 167 LBS | DIASTOLIC BLOOD PRESSURE: 80 MMHG | HEART RATE: 97 BPM | SYSTOLIC BLOOD PRESSURE: 120 MMHG | BODY MASS INDEX: 27.82 KG/M2

## 2025-01-20 DIAGNOSIS — G25.0 TREMOR, ESSENTIAL: ICD-10-CM

## 2025-01-20 DIAGNOSIS — R42 DIZZINESS AND GIDDINESS: ICD-10-CM

## 2025-01-20 DIAGNOSIS — L74.519 FOCAL HYPERHIDROSIS: ICD-10-CM

## 2025-01-20 DIAGNOSIS — M48.02 CERVICAL SPINAL STENOSIS: Primary | ICD-10-CM

## 2025-01-20 DIAGNOSIS — Z86.69 HISTORY OF MIGRAINE HEADACHES: ICD-10-CM

## 2025-01-20 DIAGNOSIS — G62.9 PERIPHERAL NEUROPATHY: ICD-10-CM

## 2025-01-20 PROCEDURE — 99213 OFFICE O/P EST LOW 20 MIN: CPT | Performed by: NURSE PRACTITIONER

## 2025-01-20 RX ORDER — QUETIAPINE FUMARATE 100 MG/1
100 TABLET, FILM COATED ORAL
Qty: 90 TABLET | Refills: 1 | Status: SHIPPED | OUTPATIENT
Start: 2025-01-20

## 2025-01-20 NOTE — ASSESSMENT & PLAN NOTE
Continue Gabapentin 300mg in the am and 600mg in the pm  Continue Cymbalta 60mg in the am per Psych provider

## 2025-01-20 NOTE — ASSESSMENT & PLAN NOTE
PT for return of neck pain and limited range of motion  Return to NeuroSurg for follow up appointment re: new and recent neck pains

## 2025-01-20 NOTE — PROGRESS NOTES
Name: Ina Tolbert      : 1955      MRN: [id number]  Encounter Provider: SHRAVAN Thakur  Encounter Date: 2025   Encounter department: [facility]  :  Assessment & Plan  Cervical spinal stenosis  PT for return of neck pain and limited range of motion  Return to NeuroSurg for follow up appointment re: new and recent neck pains       Peripheral neuropathy  Continue Gabapentin 300mg in the am and 600mg in the pm  Continue Cymbalta 60mg in the am per Psych provider       Focal hyperhidrosis  Symptoms seemed to have resolved off of melatonnin       Tremor, essential  Consider PT for strengthening       Dizziness and giddiness  Continue on Midodrine per Cardiology        History of migraine headaches  Continue with magnesium for migraine/headache/muscle cramp prevention  Tylenol over the counter at onset of headache.          Patient Instructions   Continue with magnesium 400mg daily  Continue with gabapentin 300mg in the am and 600mg at night  Continue with Cymbalta in the am  Can use the Zquil for sleep if it is helpful, further discussion with PCP for ongoing issues  Continue with midodrine per your cardiologist  Follow up with Neurology office in 6 months or sooner if needed.      History of Present Illness   Ina Tolbert is a 70yo female who follows in outpatient neurology for medical management of neuropathy, fibromyalgia, cervical radiculopathy and dizziness.    Dizziness:  Patient has PMH of hypotension, has been on midodrine 10 mg 3 times daily.  Loop recorder was placed for monitoring of arrhythmia.  Patient had recently indicated decreased incidence of dizziness.  She would have intermittent headaches with multiple adjustments to her antihypertensives continues to follow with her cardiologist for medication changes.  Patient noted since medication changes, her headaches seem to improved.  She was started on Remicade and believes that had something  "to do with it.  Last office appointment she denied having any significant issues with dizzy spells.  She had 1 episode at work where she feels she may have \"blacked out for a couple of seconds\".  She noted no signs or symptoms prior to that however noted afterwards had some excessive nausea and did not feel well.  She ate something and began to feel better after eating and has had no further issues.  Patient indicates that she generally does not eat much in the morning on a regular basis, she was encouraged to increase her oral intake and hydration to regularly planned meals.  Patient has no history of seizure activity and was unable to fully describe the event therefore EEG was withheld.  Patient was to contact neurology office if things have changed.    Patient reports back today, notes decreased episodes of dizziness.  She is currently on midodrine 5 mg daily per her cardiologist and has been tolerating this well.  She has no significant concerns with regards to her lightheadedness and dizziness at this point in time.    Cervical/lumbar radiculopathy:  Patient has issues with radiculopathy, she has been on gabapentin for this.  MRI of the lumbar spine with asymmetric L4-5 foraminal stenosis.    Patient is also gone through cervical anterior fusion from C3-5 with persistent left C4-6 foraminal stenosis indicating possible radiculitis.  She has been evaluated by neurosurgery for this.  Patient did have hospitalization for psychiatric admission, her medications were adjusted and her gabapentin was decreased.  She is on Cymbalta 60 mg daily with magnesium and Seroquel at bedtime.  Patient went through neurosurgery through the [facility], EMG was ordered to rule out ulnar or median neuropathy.  Patient reported doing well however after initiating on Cymbalta and gabapentin, currently taking her gabapentin twice a day.  Patient was starting to get paraspinal spasming, took her 's baclofen which did " help to relax the muscle tension.  She did not continue the medication once attention was alleviated.  Patient reportedly drinks quinine to assist with muscle spasming, she likes tonic water and drinks when she is having increased muscle cramps.  Last office appointment she developed onset of neck pain different from her chronic pain.  She had limited range of motion.  She denied any trauma or fall.  She had not seen neurosurgery for a couple years, was to follow-up with them after her cervical spine surgery due to ongoing surveillance.  Cervical x-rays were ordered to ensure no injury and recommended follow-up with neurosurgeon regarding her new neck pain.  Patient reported tension type headaches in the back of her head and neck, specifically her cervicalgia.  PT referral was given to start while awaiting neurosurgery input.  Patient continued on gabapentin, her dose altered.  She was taken 300 mg in the morning and 600 mg at night.  Cymbalta being managed by her psychiatric provider.    Patient states that she does continue to get some occasional neck tightness and soreness, she has attempted to get a hold of her neurosurgeon however she suspects their offices change.  She will continue to reach out and follow-up with them possibly during the summer for a follow-up evaluation.  In the meantime she held off on doing physical therapy as she is noted and significant improvements to her neck pain and mobility.  She does have the prescription to use if things should worsen.  Patient does have some continued reports of left hand tremor.  Notes that it is on and off and at times has to be careful when she is carrying something in the left hand so she does not drop it, i.e. like a plate or cup.  At this point in time is not interrupting her ADLs, is not disruptive to her.  No additional medication will be added at this time.  Patient may benefit from PT in the near future for possible weakness related to her neck in this  event.    Neuropathy:  EMG of the lower extremities in March 2020 showed a left sided superficial peroneal neuropathy otherwise normal.  She continues on gabapentin and Cymbalta and feels she has good relief.    She noted that the two gabapentin at night did help and she likes the regimen that she is on. She is taking Cymbalta in the am and tolerating it well.     Hospitalization:  6/18/2024 patient hospitalized for scalp pain, fibromyalgia and neuropathy.  She had sudden onset of the scalp region, painful to touch.  Noted this was not like her migraine type pain, pain was also not in her face or the temporal region.  His posterior parietal.  Patient was noted to have some bumps on evaluation, recommended to follow-up with dermatology.  She reported that the bumps did go away over some time but notes she does have increased diaphoresis of the scalp but no further scalp pain.    She has not had any further issues with the sensation or pain on the scalp, it seems to have resolved.     Migraine:  Patient has history of migraine, these have been well-controlled and has not had any migraines over a prolonged period of time.  She is denied any frontal or occipital migraine activity.  Notes headaches are generally cervicogenic in nature and Tylenol is effective for them.    She is still on the magnesium for headache and cramps. She noted that she did have a period of time when she was off of the mag and her leg cramps came back but headaches remained stable. No changes with this.     Additional issues:  Patient did have some concerns with regard to the diaphoresis of her scalp.  Question if this was related to the weather during heat wave versus other hormonal etiology.  Question whether or not this may be related to the addition of melatonin which is a hormone.  Patient has a PCP and rheumatologist, encouraged conversation regarding hormonal versus autoimmune etiology and advised to decrease her melatonin back to 10 mg  nightly.  She did have some concerns with insomnia which is why she is on the melatonin.  She was to decrease it to 10 mg and notes that she was encouraged to take her Cymbalta during the morning.  She is on Seroquel at bedtime she reported being out of her medication for extended period of time if he has intolerance to that will be talking to her psychiatrist.  Patient also reported upper extremity tremors starting approximately 3 months before her last office appointment.  She did note a left hand tremor with action.  No tremor noted at rest.  Some mild tremoring in the left upper extremity, thumb and finger during pronator drift testing with no excessive action tremor seen.  Patient had no significant abnormal labs that were seen.  She is right-hand dominant does not use her left hand much.  She feels the left upper extremity seems weaker and has had onset of neck pain, question related to cervical radicular symptoms versus possible essential tremor.    Patient reports that her sleep is not as good as she would like.  She did stop taking the melatonin, did not feel as though it was working as well as it used to.  She is started taking the ZzzQuil over-the-counter which she felt was working fairly well for her.  She had noted since coming down and discontinuing the melatonin she is no longer having the night sweats of the scalp that she had previously reported.  Patient encouraged to follow-up with her PCP regarding ongoing sleep issues in the near future.    Patient will follow-up in outpatient neurology office in 6 months or sooner if needed.        Review of Systems   Constitutional:  Negative for chills and fever.   HENT:  Negative for ear pain and sore throat.    Eyes:  Negative for pain and visual disturbance.   Respiratory:  Negative for cough and shortness of breath.    Cardiovascular:  Negative for chest pain and palpitations.   Gastrointestinal:  Negative for abdominal pain and vomiting.   Genitourinary:   Negative for dysuria and hematuria.   Musculoskeletal:  Negative for arthralgias and back pain.   Skin:  Negative for color change and rash.   Neurological:  Positive for light-headedness and numbness. Negative for seizures and syncope.        + neuropathy discomfort   All other systems reviewed and are negative.   I have personally reviewed the MA's review of systems and made changes as necessary.        Past Medical History:   Diagnosis Date    Abdominal adhesions     Alcoholism (Formerly [facility]) 1996    in remission    Anesthesia complication     difficult to wake up    Anxiety     Arthritis     Asthma     with exertion    Cancer (Formerly [facility])     melanoma    Colon polyp     Crohn's disease (Formerly [facility])     Depression     Depression     Disease of thyroid gland     Fibromyalgia     Fibromyalgia, primary     Fractures 2006    GERD (gastroesophageal reflux disease)     History of cholecystectomy 09/07/2019    Hyperlipidemia     Infected tooth 01/2024    Tooth #14-was treated with antibiotics-endodontal appt 2/1 may need root canal    Irregular heart beat     can be tachy; also has orthoststic hypotension    Irritable bowel syndrome 1990    Lazy eye     resolved: 3/27/17    Medical clearance for psychiatric admission 07/13/2021    Melanoma (Formerly [facility]) 2010    Mild asthma 11/04/2020    Osteoarthritis 07/2018    Osteopenia     with joint pain-elevated DEV    Polymyalgia (Former[facility]) 2/10/2022    Psychiatric disorder     Shortness of breath     assoc with tachycardia    Stomach disorder 1995    Tinnitus     Wears glasses     for reading       Past Surgical History:   Procedure Laterality Date    ABDOMINAL SURGERY      lysis of adhesions x 2    APPENDECTOMY      CERVICAL FUSION      May 5,2021 and Jan. 01,2020    CHOLECYSTECTOMY      open    COLONOSCOPY      DILATION AND CURETTAGE OF UTERUS      FOOT SURGERY  05/2017    NECK SURGERY  01/2019 5/2021    NEUROMA EXCISION Right 05/26/2017    Procedure: EXCISION MASS / FIBROMA FOOT;  Surgeon: Jorden Crespo DPM;   Location: [facility];  Service:     PARS PLANA VITRECTOMY W/ REPAIR OF MACULAR HOLE  12/23/2020    PA ARTHRP KNE CONDYLE&PLATU MEDIAL&LAT COMPARTMENTS Right 2/6/2024    Procedure: ARTHROPLASTY KNEE TOTAL W ROBOT - RIGHT - PRESS FIT - OVERNIGHT;  Surgeon: Jairon Kim DO;  Location: [facility];  Service: Orthopedics    PA XCAPSL CTRC RMVL INSJ IO LENS PROSTH W/O ECP Left 12/06/2021    Procedure: EXTRACTION EXTRACAPSULAR CATARACT PHACO INTRAOCULAR LENS (IOL);  Surgeon: Mandeep Chavez MD;  Location: [facility];  Service: Ophthalmology    RIGHT OOPHORECTOMY      UPPER GASTROINTESTINAL ENDOSCOPY  5/2019    WISDOM TOOTH EXTRACTION      x4       Social History     Socioeconomic History    Marital status: /Civil Union     Spouse name: None    Number of children: None    Years of education: None    Highest education level: None   Occupational History    None   Tobacco Use    Smoking status: Never     Passive exposure: Never    Smokeless tobacco: Never   Vaping Use    Vaping status: Never Used   Substance and Sexual Activity    Alcohol use: Not Currently    Drug use: No    Sexual activity: Not Currently     Partners: Male   Other Topics Concern    None   Social History Narrative    None     Social Drivers of Health     Financial Resource Strain: Not on file   Food Insecurity: No Food Insecurity (6/18/2024)    Nursing - Inadequate Food Risk Classification     Worried About Running Out of Food in the Last Year: Never true     Ran Out of Food in the Last Year: Never true     Ran Out of Food in the Last Year: Not on file   Transportation Needs: No Transportation Needs (6/18/2024)    PRAPARE - Transportation     Lack of Transportation (Medical): No     Lack of Transportation (Non-Medical): No   Physical Activity: Not on file   Stress: Not on file   Social Connections: Not on file   Intimate Partner Violence: Not on file   Housing Stability: Low Risk  (6/18/2024)    Housing Stability Vital Sign     Unable to Pay for  Housing in the Last Year: No     Number of Times Moved in the Last Year: 0     Homeless in the Last Year: No       Family History   Problem Relation Age of Onset    Heart disease Mother     Stroke Mother 62    COPD Mother     Arthritis Mother     Asthma Mother     Hypertension Father     Dislocations Sister     Multiple sclerosis Sister     Neurological problems Sister     Scoliosis Sister     Depression Sister     Mental illness Sister     No Known Problems Maternal Grandmother     No Known Problems Maternal Grandfather     No Known Problems Paternal Grandmother     No Known Problems Paternal Grandfather     Kidney disease Brother         kidney transplant    No Known Problems Maternal Aunt     No Known Problems Maternal Uncle     No Known Problems Paternal Aunt     No Known Problems Paternal Uncle     ADD / ADHD Neg Hx     Anesthesia problems Neg Hx     Cancer Neg Hx     Clotting disorder Neg Hx     Collagen disease Neg Hx     Diabetes Neg Hx     Dislocations Neg Hx     Learning disabilities Neg Hx     Neurological problems Neg Hx     Osteoporosis Neg Hx     Rheumatologic disease Neg Hx     Scoliosis Neg Hx     Vascular Disease Neg Hx          Current Outpatient Medications:     albuterol (2.5 mg/3 mL) 0.083 % nebulizer solution, Take 3 mL (2.5 mg total) by nebulization every 6 (six) hours as needed for wheezing or shortness of breath, Disp: 75 mL, Rfl: 0    albuterol (ProAir HFA) 90 mcg/act inhaler, Inhale 2 puffs every 6 (six) hours as needed for wheezing, Disp: 8.5 g, Rfl: 6    benzonatate (TESSALON PERLES) 100 mg capsule, Take 1 capsule (100 mg total) by mouth 2 (two) times a day as needed for cough, Disp: 21 capsule, Rfl: 0    Calcium Carb-Cholecalciferol 600-12.5 MG-MCG CAPS, Take by mouth daily in the early morning, Disp: , Rfl:     Denta 5000 Plus 1.1 % CREA, USE ONCE DAILY AT NIGHT, Disp: , Rfl:     docusate sodium (COLACE) 100 mg capsule, Take 1 capsule (100 mg total) by mouth 2 (two) times a day, Disp:  "60 capsule, Rfl: 0    DULoxetine (CYMBALTA) 60 mg delayed release capsule, TAKE 1 CAPSULE BY MOUTH EVERY DAY, Disp: 90 capsule, Rfl: 1    esomeprazole (NexIUM) 40 MG capsule, TAKE 1 CAPSULE BY MOUTH EVERY DAY BEFORE BREAKFAST, Disp: 90 capsule, Rfl: 1    Fluticasone-Salmeterol (Wixela Inhub) 100-50 mcg/dose inhaler, Inhale 1 puff 2 (two) times a day Rinse mouth after use., Disp: 60 blister, Rfl: 3    gabapentin (NEURONTIN) 300 mg capsule, Take 1 capsule (300 mg total) by mouth 3 (three) times a day, Disp: 270 capsule, Rfl: 3    levothyroxine 50 mcg tablet, TAKE 1 TABLET BY MOUTH EVERY DAY, Disp: 90 tablet, Rfl: 1    midodrine (PROAMATINE) 5 mg tablet, Take 5 mg by mouth daily, Disp: , Rfl:     QUEtiapine (SEROquel) 100 mg tablet, TAKE 1 TABLET BY MOUTH DAILY AT BEDTIME, Disp: 90 tablet, Rfl: 1    albuterol (PROVENTIL HFA,VENTOLIN HFA) 90 mcg/act inhaler, Inhale 2 puffs every 6 (six) hours as needed for wheezing or shortness of breath (Patient not taking: Reported on 1/20/2025), Disp: 18 g, Rfl: 0    famotidine (PEPCID) 20 mg tablet, Take 1 tablet (20 mg total) by mouth daily as needed for heartburn (Patient not taking: Reported on 11/11/2024), Disp: 90 tablet, Rfl: 3    methylPREDNISolone 4 MG tablet therapy pack, Use as directed on package (Patient not taking: Reported on 11/11/2024), Disp: 21 tablet, Rfl: 0    Allergies   Allergen Reactions    Meperidine Lightheadedness and Other (See Comments)    Sulfa Antibiotics Hives        Blood pressure 120/80, pulse 97, height 5' 5\" (1.651 m), weight 75.8 kg (167 lb).       Objective   LMP  (LMP Unknown)     Physical Exam  Vitals reviewed.   Constitutional:       Appearance: Normal appearance. She is well-developed.   HENT:      Head: Normocephalic.      Nose: Nose normal.      Mouth/Throat:      Mouth: Mucous membranes are moist.   Eyes:      General: Lids are normal.      Extraocular Movements: Extraocular movements intact.      Pupils: Pupils are equal, round, and " reactive to light.   Pulmonary:      Effort: Pulmonary effort is normal.   Abdominal:      Palpations: Abdomen is soft.   Musculoskeletal:      Cervical back: Normal range of motion.   Skin:     General: Skin is warm and dry.   Neurological:      Mental Status: She is alert.      Coordination: Romberg sign negative.      Deep Tendon Reflexes: Reflexes are normal and symmetric.   Psychiatric:         Attention and Perception: Attention and perception normal.         Mood and Affect: Mood and affect normal.         Speech: Speech normal.         Behavior: Behavior normal. Behavior is cooperative.         Thought Content: Thought content normal.         Cognition and Memory: Cognition and memory normal.         Judgment: Judgment normal.       Neurological Exam  Mental Status  Alert. Oriented to person, place, time and situation. Memory is normal. Recent and remote memory are intact. Speech is normal. Language is fluent with no aphasia. Attention and concentration are normal. Fund of knowledge is appropriate for level of education.    Cranial Nerves  CN II: Visual acuity is normal. Visual fields full to confrontation.  CN III, IV, VI: Extraocular movements intact bilaterally. Normal lids and orbits bilaterally. Pupils equal round and reactive to light bilaterally.  CN V: Facial sensation is normal.  CN VII: Full and symmetric facial movement.  CN VIII: Hearing is normal.  CN IX, X: Palate elevates symmetrically. Normal gag reflex.  CN XI: Shoulder shrug strength is normal.  CN XII: Tongue midline without atrophy or fasciculations.    Motor  Normal muscle bulk throughout. Normal muscle tone. No abnormal involuntary movements. Strength is 5/5 in all four extremities except as noted. No pronator drift.                                             Right                     Left   Iliopsoas                               5-                          5-    Sensory  Light touch is normal in upper and lower extremities.  Temperature is normal in upper and lower extremities. Vibration is normal in upper and lower extremities. Proprioception is normal in upper and lower extremities.     Reflexes  Deep tendon reflexes are 2+ and symmetric in all four extremities.    Right pathological reflexes: Malik's absent.  Left pathological reflexes: Malik's absent.    Coordination  Right: Finger-to-nose normal. Rapid alternating movement normal. Heel-to-shin normal.Left: Finger-to-nose normal. Rapid alternating movement normal. Heel-to-shin normal.    Gait  Casual gait is normal including stance, stride, and arm swing.Normal toe walking. Normal heel walking. Normal tandem gait. Romberg is absent. Able to rise from chair without using arms.      Radiology Results Review : No pertinent imaging studies reviewed.    Administrative Statements   I have spent a total time of 20 minutes in caring for this patient on the day of the visit/encounter including Instructions for management, Patient and family education, Importance of tx compliance, Counseling / Coordination of care, Documenting in the medical record, Reviewing / ordering tests, medicine, procedures  , and Obtaining or reviewing history  .

## 2025-01-20 NOTE — ASSESSMENT & PLAN NOTE
Continue with magnesium for migraine/headache/muscle cramp prevention  Tylenol over the counter at onset of headache.

## 2025-01-20 NOTE — PATIENT INSTRUCTIONS
Continue with magnesium 400mg daily  Continue with gabapentin 300mg in the am and 600mg at night  Continue with Cymbalta in the am  Can use the Zquil for sleep if it is helpful, further discussion with PCP for ongoing issues  Continue with midodrine per your cardiologist  Follow up with Neurology office in 6 months or sooner if needed.

## 2025-01-25 ENCOUNTER — OFFICE VISIT (OUTPATIENT)
Dept: URGENT CARE | Facility: CLINIC | Age: 70
End: 2025-01-25
Payer: COMMERCIAL

## 2025-01-25 ENCOUNTER — APPOINTMENT (OUTPATIENT)
Dept: RADIOLOGY | Facility: CLINIC | Age: 70
End: 2025-01-25
Payer: COMMERCIAL

## 2025-01-25 ENCOUNTER — APPOINTMENT (EMERGENCY)
Dept: RADIOLOGY | Facility: HOSPITAL | Age: 70
End: 2025-01-25
Payer: COMMERCIAL

## 2025-01-25 ENCOUNTER — HOSPITAL ENCOUNTER (EMERGENCY)
Facility: HOSPITAL | Age: 70
Discharge: HOME | End: 2025-01-25
Attending: EMERGENCY MEDICINE | Admitting: EMERGENCY MEDICINE
Payer: COMMERCIAL

## 2025-01-25 VITALS
HEART RATE: 93 BPM | SYSTOLIC BLOOD PRESSURE: 137 MMHG | RESPIRATION RATE: 18 BRPM | OXYGEN SATURATION: 99 % | DIASTOLIC BLOOD PRESSURE: 86 MMHG | TEMPERATURE: 98.5 F

## 2025-01-25 VITALS
SYSTOLIC BLOOD PRESSURE: 126 MMHG | RESPIRATION RATE: 16 BRPM | HEART RATE: 102 BPM | OXYGEN SATURATION: 98 % | TEMPERATURE: 98 F | DIASTOLIC BLOOD PRESSURE: 82 MMHG | WEIGHT: 169 LBS | BODY MASS INDEX: 28.12 KG/M2

## 2025-01-25 DIAGNOSIS — M54.12 CERVICAL RADICULOPATHY: Primary | ICD-10-CM

## 2025-01-25 LAB
ALBUMIN SERPL BCG-MCNC: 4.1 G/DL (ref 3.5–5)
ALP SERPL-CCNC: 68 U/L (ref 34–104)
ALT SERPL W P-5'-P-CCNC: 20 U/L (ref 7–52)
ANION GAP SERPL CALCULATED.3IONS-SCNC: 9 MMOL/L (ref 4–13)
AST SERPL W P-5'-P-CCNC: 21 U/L (ref 13–39)
BASOPHILS # BLD AUTO: 0.05 THOUSANDS/ΜL (ref 0–0.1)
BASOPHILS NFR BLD AUTO: 1 % (ref 0–1)
BILIRUB SERPL-MCNC: 0.47 MG/DL (ref 0.2–1)
BUN SERPL-MCNC: 13 MG/DL (ref 5–25)
CALCIUM SERPL-MCNC: 10 MG/DL (ref 8.4–10.2)
CHLORIDE SERPL-SCNC: 104 MMOL/L (ref 96–108)
CO2 SERPL-SCNC: 24 MMOL/L (ref 21–32)
CREAT SERPL-MCNC: 0.85 MG/DL (ref 0.6–1.3)
EOSINOPHIL # BLD AUTO: 0.16 THOUSAND/ΜL (ref 0–0.61)
EOSINOPHIL NFR BLD AUTO: 3 % (ref 0–6)
ERYTHROCYTE [DISTWIDTH] IN BLOOD BY AUTOMATED COUNT: 14.2 % (ref 11.6–15.1)
GFR SERPL CREATININE-BSD FRML MDRD: 70 ML/MIN/1.73SQ M
GLUCOSE SERPL-MCNC: 90 MG/DL (ref 65–140)
HCT VFR BLD AUTO: 41.5 % (ref 34.8–46.1)
HGB BLD-MCNC: 13.2 G/DL (ref 11.5–15.4)
IMM GRANULOCYTES # BLD AUTO: 0.01 THOUSAND/UL (ref 0–0.2)
IMM GRANULOCYTES NFR BLD AUTO: 0 % (ref 0–2)
LYMPHOCYTES # BLD AUTO: 1.23 THOUSANDS/ΜL (ref 0.6–4.47)
LYMPHOCYTES NFR BLD AUTO: 20 % (ref 14–44)
MCH RBC QN AUTO: 26.6 PG (ref 26.8–34.3)
MCHC RBC AUTO-ENTMCNC: 31.8 G/DL (ref 31.4–37.4)
MCV RBC AUTO: 84 FL (ref 82–98)
MONOCYTES # BLD AUTO: 0.51 THOUSAND/ΜL (ref 0.17–1.22)
MONOCYTES NFR BLD AUTO: 8 % (ref 4–12)
NEUTROPHILS # BLD AUTO: 4.34 THOUSANDS/ΜL (ref 1.85–7.62)
NEUTS SEG NFR BLD AUTO: 68 % (ref 43–75)
NRBC BLD AUTO-RTO: 0 /100 WBCS
PLATELET # BLD AUTO: 239 THOUSANDS/UL (ref 149–390)
PMV BLD AUTO: 9.6 FL (ref 8.9–12.7)
POTASSIUM SERPL-SCNC: 4.2 MMOL/L (ref 3.5–5.3)
PROT SERPL-MCNC: 7.3 G/DL (ref 6.4–8.4)
RBC # BLD AUTO: 4.96 MILLION/UL (ref 3.81–5.12)
SODIUM SERPL-SCNC: 137 MMOL/L (ref 135–147)
WBC # BLD AUTO: 6.3 THOUSAND/UL (ref 4.31–10.16)

## 2025-01-25 PROCEDURE — 72050 X-RAY EXAM NECK SPINE 4/5VWS: CPT

## 2025-01-25 PROCEDURE — 80053 COMPREHEN METABOLIC PANEL: CPT | Performed by: EMERGENCY MEDICINE

## 2025-01-25 PROCEDURE — 96374 THER/PROPH/DIAG INJ IV PUSH: CPT

## 2025-01-25 PROCEDURE — 96375 TX/PRO/DX INJ NEW DRUG ADDON: CPT

## 2025-01-25 PROCEDURE — 99284 EMERGENCY DEPT VISIT MOD MDM: CPT

## 2025-01-25 PROCEDURE — 72125 CT NECK SPINE W/O DYE: CPT

## 2025-01-25 PROCEDURE — 36415 COLL VENOUS BLD VENIPUNCTURE: CPT | Performed by: EMERGENCY MEDICINE

## 2025-01-25 PROCEDURE — 99213 OFFICE O/P EST LOW 20 MIN: CPT | Performed by: FAMILY MEDICINE

## 2025-01-25 PROCEDURE — 85025 COMPLETE CBC W/AUTO DIFF WBC: CPT | Performed by: EMERGENCY MEDICINE

## 2025-01-25 PROCEDURE — 99284 EMERGENCY DEPT VISIT MOD MDM: CPT | Performed by: EMERGENCY MEDICINE

## 2025-01-25 RX ORDER — METHYLPREDNISOLONE SODIUM SUCCINATE 125 MG/2ML
80 INJECTION, POWDER, LYOPHILIZED, FOR SOLUTION INTRAMUSCULAR; INTRAVENOUS ONCE
Status: COMPLETED | OUTPATIENT
Start: 2025-01-25 | End: 2025-01-25

## 2025-01-25 RX ORDER — HYDROMORPHONE HCL/PF 1 MG/ML
0.5 SYRINGE (ML) INJECTION ONCE
Refills: 0 | Status: COMPLETED | OUTPATIENT
Start: 2025-01-25 | End: 2025-01-25

## 2025-01-25 RX ORDER — DIAZEPAM 10 MG/2ML
5 INJECTION, SOLUTION INTRAMUSCULAR; INTRAVENOUS ONCE
Status: COMPLETED | OUTPATIENT
Start: 2025-01-25 | End: 2025-01-25

## 2025-01-25 RX ORDER — OXYCODONE HYDROCHLORIDE 5 MG/1
5 TABLET ORAL 3 TIMES DAILY PRN
Qty: 21 TABLET | Refills: 0 | Status: SHIPPED | OUTPATIENT
Start: 2025-01-25 | End: 2025-02-04

## 2025-01-25 RX ORDER — OXYCODONE HYDROCHLORIDE 5 MG/1
5 TABLET ORAL ONCE
Refills: 0 | Status: COMPLETED | OUTPATIENT
Start: 2025-01-25 | End: 2025-01-25

## 2025-01-25 RX ORDER — PREDNISONE 10 MG/1
TABLET ORAL
Qty: 30 TABLET | Refills: 0 | Status: SHIPPED | OUTPATIENT
Start: 2025-01-25

## 2025-01-25 RX ORDER — CYCLOBENZAPRINE HCL 10 MG
10 TABLET ORAL 2 TIMES DAILY PRN
Qty: 14 TABLET | Refills: 0 | Status: SHIPPED | OUTPATIENT
Start: 2025-01-25 | End: 2025-02-01

## 2025-01-25 RX ADMIN — OXYCODONE 5 MG: 5 TABLET ORAL at 21:20

## 2025-01-25 RX ADMIN — HYDROMORPHONE HYDROCHLORIDE 0.5 MG: 1 INJECTION, SOLUTION INTRAMUSCULAR; INTRAVENOUS; SUBCUTANEOUS at 18:15

## 2025-01-25 RX ADMIN — METHYLPREDNISOLONE SODIUM SUCCINATE 80 MG: 125 INJECTION, POWDER, FOR SOLUTION INTRAMUSCULAR; INTRAVENOUS at 18:19

## 2025-01-25 RX ADMIN — DIAZEPAM 5 MG: 10 INJECTION, SOLUTION INTRAMUSCULAR; INTRAVENOUS at 18:13

## 2025-01-25 NOTE — ED PROVIDER NOTES
Time reflects when diagnosis was documented in both MDM as applicable and the Disposition within this note       Time User Action Codes Description Comment    1/25/2025  9:00 PM Neha Izquierdo Add [M54.12] Cervical radiculopathy           ED Disposition       ED Disposition   Discharge    Condition   Stable    Date/Time   Sat Jan 25, 2025  9:00 PM    Comment   Ina Meade discharge to home/self care.                   Assessment & Plan       Medical Decision Making  Pt. With cervical radiculopathy and likely nerve impingement/inflammation.  Pt. Medicated for pain and inflammation.  Will do CT scan.  MRI is not available.  2000- awaiting CT report.      CT report reviewed and discussed pt. Symptoms and exam and CT with the neurosurgeon on call.  Will discharge with steroid, pain medicine, outpt. MRI and follow up.  Pt. Is agreeable.    Amount and/or Complexity of Data Reviewed  Labs: ordered.  Radiology: ordered.    Risk  Prescription drug management.             Medications   methylPREDNISolone sodium succinate (Solu-MEDROL) injection 80 mg (80 mg Intravenous Given 1/25/25 1819)   diazepam (VALIUM) injection 5 mg (5 mg Intravenous Given 1/25/25 1813)   HYDROmorphone (DILAUDID) injection 0.5 mg (0.5 mg Intravenous Given 1/25/25 1815)   oxyCODONE (ROXICODONE) IR tablet 5 mg (5 mg Oral Given 1/25/25 2120)       ED Risk Strat Scores                          SBIRT 22yo+      Flowsheet Row Most Recent Value   Initial Alcohol Screen: US AUDIT-C     1. How often do you have a drink containing alcohol? 0 Filed at: 01/25/2025 1617   3b. FEMALE Any Age, or MALE 65+: How often do you have 4 or more drinks on one occassion? 0 Filed at: 01/25/2025 1617   Audit-C Score 0 Filed at: 01/25/2025 1617   SHARON: How many times in the past year have you...    Used an illegal drug or used a prescription medication for non-medical reasons? Never Filed at: 01/25/2025 1617                            History of Present Illness       Chief  Complaint   Patient presents with    Neck Pain     States she was seen at [facility] and had xrays done for neck pain that radiates down left arm that started on 1/23 that became worse last night. Hx of neck surgery. Pain became worse this afternoon. Sent for cervical radiculopathy and patient states was sent for a steroid injection into her neck and further evaluation        Past Medical History:   Diagnosis Date    Abdominal adhesions     Alcoholism ([facility]) 1996    in remission    Anesthesia complication     difficult to wake up    Anxiety     Arthritis     Asthma     with exertion    Cancer (HCC)     melanoma    Colon polyp     Crohn's disease (HCC)     Depression     Depression     Disease of thyroid gland     Fibromyalgia     Fibromyalgia, primary     Fractures 2006    GERD (gastroesophageal reflux disease)     History of cholecystectomy 09/07/2019    Hyperlipidemia     Infected tooth 01/2024    Tooth #14-was treated with antibiotics-endodontal appt 2/1 may need root canal    Irregular heart beat     can be tachy; also has orthoststic hypotension    Irritable bowel syndrome 1990    Lazy eye     resolved: 3/27/17    Medical clearance for psychiatric admission 07/13/2021    Melanoma ([facility]) 2010    Mild asthma 11/04/2020    Osteoarthritis 07/2018    Osteopenia     with joint pain-elevated DEV    Polymyalgia ([facility]) 2/10/2022    Psychiatric disorder     Shortness of breath     assoc with tachycardia    Stomach disorder 1995    Tinnitus     Wears glasses     for reading      Past Surgical History:   Procedure Laterality Date    ABDOMINAL SURGERY      lysis of adhesions x 2    APPENDECTOMY      CERVICAL FUSION      May 5,2021 and Jan. 01,2020    CHOLECYSTECTOMY      open    COLONOSCOPY      DILATION AND CURETTAGE OF UTERUS      FOOT SURGERY  05/2017    NECK SURGERY  01/2019 5/2021    NEUROMA EXCISION Right 05/26/2017    Procedure: EXCISION MASS / FIBROMA FOOT;  Surgeon: Jorden Crespo DPM;  Location: [facility];  Service:      PARS PLANA VITRECTOMY W/ REPAIR OF MACULAR HOLE  12/23/2020    KS ARTHRP KNE CONDYLE&PLATU MEDIAL&LAT COMPARTMENTS Right 2/6/2024    Procedure: ARTHROPLASTY KNEE TOTAL W ROBOT - RIGHT - PRESS FIT - OVERNIGHT;  Surgeon: Jairon Kim DO;  Location: [facility];  Service: Orthopedics    KS XCAPSL CTRC RMVL INSJ IO LENS PROSTH W/O ECP Left 12/06/2021    Procedure: EXTRACTION EXTRACAPSULAR CATARACT PHACO INTRAOCULAR LENS (IOL);  Surgeon: Mandeep Chavez MD;  Location: [facility] MAIN OR;  Service: Ophthalmology    RIGHT OOPHORECTOMY      UPPER GASTROINTESTINAL ENDOSCOPY  5/2019    WISDOM TOOTH EXTRACTION      x4      Family History   Problem Relation Age of Onset    Heart disease Mother     Stroke Mother 62    COPD Mother     Arthritis Mother     Asthma Mother     Hypertension Father     Dislocations Sister     Multiple sclerosis Sister     Neurological problems Sister     Scoliosis Sister     Depression Sister     Mental illness Sister     No Known Problems Maternal Grandmother     No Known Problems Maternal Grandfather     No Known Problems Paternal Grandmother     No Known Problems Paternal Grandfather     Kidney disease Brother         kidney transplant    No Known Problems Maternal Aunt     No Known Problems Maternal Uncle     No Known Problems Paternal Aunt     No Known Problems Paternal Uncle     ADD / ADHD Neg Hx     Anesthesia problems Neg Hx     Cancer Neg Hx     Clotting disorder Neg Hx     Collagen disease Neg Hx     Diabetes Neg Hx     Dislocations Neg Hx     Learning disabilities Neg Hx     Neurological problems Neg Hx     Osteoporosis Neg Hx     Rheumatologic disease Neg Hx     Scoliosis Neg Hx     Vascular Disease Neg Hx       Social History     Tobacco Use    Smoking status: Never     Passive exposure: Never    Smokeless tobacco: Never   Vaping Use    Vaping status: Never Used   Substance Use Topics    Alcohol use: Not Currently    Drug use: No      E-Cigarette/Vaping    E-Cigarette Use Never User        E-Cigarette/Vaping Substances    Nicotine No     THC No     CBD No     Flavoring No     Other No     Unknown No       I have reviewed and agree with the history as documented.     70 yo female sent by urgent care for evaluation.  Pt. Has history of chronic neck pain, has had surgery twice in the past, last time was 3 years ago.  She says 3 days ago she started with worsening neck pain.  No specific fall or new injury.  The pain has worsened and is now severe.  + associated left arm pain, numbness.  She also noted that the fingers of her left hand keep flexing inward and her hand seems weaker.  No relief of symptoms with tylenol at home.  No fever, cough, vomiting, diarrhea.  No difficulty with her bowels or bladder.      History provided by:  Patient   used: No    Neck Pain  Associated symptoms: numbness and weakness    Associated symptoms: no chest pain, no fever and no headaches        Review of Systems   Constitutional: Negative.  Negative for fever.   HENT: Negative.     Eyes: Negative.    Respiratory: Negative.  Negative for cough and shortness of breath.    Cardiovascular: Negative.  Negative for chest pain.   Gastrointestinal: Negative.  Negative for abdominal pain, diarrhea, nausea and vomiting.   Genitourinary: Negative.  Negative for dysuria and flank pain.   Musculoskeletal:  Positive for neck pain. Negative for back pain and myalgias.   Skin: Negative.  Negative for rash.   Neurological:  Positive for weakness and numbness. Negative for dizziness and headaches.   Hematological:  Does not bruise/bleed easily.   Psychiatric/Behavioral: Negative.     All other systems reviewed and are negative.          Objective       ED Triage Vitals   Temperature Pulse Blood Pressure Respirations SpO2 Patient Position - Orthostatic VS   01/25/25 1613 01/25/25 1613 01/25/25 1613 01/25/25 1613 01/25/25 1613 01/25/25 1613   (!) 97 °F (36.1 °C) (!) 110 147/89 18 96 % Sitting      Temp Source Heart Rate  Source BP Location FiO2 (%) Pain Score    01/25/25 1613 01/25/25 1613 01/25/25 1613 -- 01/25/25 1616    Tympanic Monitor Left arm  8      Vitals      Date and Time Temp Pulse SpO2 Resp BP Pain Score FACES Pain Rating User   01/25/25 2118 98.5 °F (36.9 °C) 93 99 % 18 137/86 -- -- CAC   01/25/25 1815 -- -- -- -- -- 10 - Worst Possible Pain -- MIRIAM   01/25/25 1748 98.5 °F (36.9 °C) 91 96 % 19 155/85 -- -- FP   01/25/25 1616 -- -- -- -- -- 8 -- SW   01/25/25 1613 97 °F (36.1 °C) 110 96 % 18 147/89 -- -- SW            Physical Exam  Vitals and nursing note reviewed.   Constitutional:       General: She is in acute distress.      Appearance: She is well-developed. She is not diaphoretic.   HENT:      Head: Normocephalic and atraumatic.   Eyes:      Extraocular Movements: Extraocular movements intact.      Conjunctiva/sclera: Conjunctivae normal.      Pupils: Pupils are equal, round, and reactive to light.   Cardiovascular:      Rate and Rhythm: Normal rate and regular rhythm.      Heart sounds: Normal heart sounds. No murmur heard.  Pulmonary:      Effort: Pulmonary effort is normal. No respiratory distress.      Breath sounds: Normal breath sounds.   Abdominal:      General: Bowel sounds are normal. There is no distension.      Palpations: Abdomen is soft.      Tenderness: There is no abdominal tenderness.   Musculoskeletal:         General: No deformity. Normal range of motion.      Cervical back: Normal range of motion and neck supple. Tenderness present.      Right lower leg: No edema.      Left lower leg: No edema.   Skin:     General: Skin is warm and dry.      Coloration: Skin is not pale.      Findings: No rash.   Neurological:      Mental Status: She is alert and oriented to person, place, and time.      Cranial Nerves: No cranial nerve deficit.      Sensory: Sensory deficit present.      Motor: Weakness present.      Comments: Left hand with claw hand deformity, she can extend all her fingers but they  automatically drift back into flexion.  She feels light touch sensation is altered on left hand and left arm.  Her  is weaker on the left.   Psychiatric:         Mood and Affect: Mood normal.         Behavior: Behavior normal.         Results Reviewed       Procedure Component Value Units Date/Time    Comprehensive metabolic panel [[id number]] Collected: 01/25/25 1813    Lab Status: Final result Specimen: Blood from Arm, Right Updated: 01/25/25 1834     Sodium 137 mmol/L      Potassium 4.2 mmol/L      Chloride 104 mmol/L      CO2 24 mmol/L      ANION GAP 9 mmol/L      BUN 13 mg/dL      Creatinine 0.85 mg/dL      Glucose 90 mg/dL      Calcium 10.0 mg/dL      AST 21 U/L      ALT 20 U/L      Alkaline Phosphatase 68 U/L      Total Protein 7.3 g/dL      Albumin 4.1 g/dL      Total Bilirubin 0.47 mg/dL      eGFR 70 ml/min/1.73sq m     Narrative:      National Kid[employer] Foundation guidelines for Chronic Kidney Disease (CKD):     Stage 1 with normal or high GFR (GFR > 90 mL/min/1.73 square meters)    Stage 2 Mild CKD (GFR = 60-89 mL/min/1.73 square meters)    Stage 3A Moderate CKD (GFR = 45-59 mL/min/1.73 square meters)    Stage 3B Moderate CKD (GFR = 30-44 mL/min/1.73 square meters)    Stage 4 Severe CKD (GFR = 15-29 mL/min/1.73 square meters)    Stage 5 End Stage CKD (GFR <15 mL/min/1.73 square meters)  Note: GFR calculation is accurate only with a steady state creatinine    CBC and differential [[id number]]  (Abnormal) Collected: 01/25/25 1813    Lab Status: Final result Specimen: Blood from Arm, Right Updated: 01/25/25 1817     WBC 6.30 Thousand/uL      RBC 4.96 Million/uL      Hemoglobin 13.2 g/dL      Hematocrit 41.5 %      MCV 84 fL      MCH 26.6 pg      MCHC 31.8 g/dL      RDW 14.2 %      MPV 9.6 fL      Platelets 239 Thousands/uL      nRBC 0 /100 WBCs      Segmented % 68 %      Immature Grans % 0 %      Lymphocytes % 20 %      Monocytes % 8 %      Eosinophils Relative 3 %      Basophils Relative 1 %       Absolute Neutrophils 4.34 Thousands/µL      Absolute Immature Grans 0.01 Thousand/uL      Absolute Lymphocytes 1.23 Thousands/µL      Absolute Monocytes 0.51 Thousand/µL      Eosinophils Absolute 0.16 Thousand/µL      Basophils Absolute 0.05 Thousands/µL             CT cervical spine without contrast   Final Interpretation by Nicolás Alcantara MD (01/25 2033)      No acute osseous abnormality.      Stable postop change as described above with well incorporated bone grafts at the C3-4, C4-5 and C5-6 levels.  Endplate and uncinate joint hypertrophic osteophyte formation is again noted with significant resulting foraminal narrowing, severe at several    levels.      If symptoms persist, a nonemergent MRI of the cervical spine could be performed for further evaluation.         Workstation performed: [id number]         MRI cervical spine wo contrast    (Results Pending)       Procedures    ED Medication and Procedure Management   Prior to Admission Medications   Prescriptions Last Dose Informant Patient Reported? Taking?   Calcium Carb-Cholecalciferol 600-12.5 MG-MCG CAPS  Self Yes No   Sig: Take by mouth daily in the early morning   DULoxetine (CYMBALTA) 60 mg delayed release capsule   No No   Sig: TAKE 1 CAPSULE BY MOUTH EVERY DAY   Denta 5000 Plus 1.1 % CREA  Self Yes No   Sig: USE ONCE DAILY AT NIGHT   Fluticasone-Salmeterol (Wixela Inhub) 100-50 mcg/dose inhaler   No No   Sig: Inhale 1 puff 2 (two) times a day Rinse mouth after use.   QUEtiapine (SEROquel) 100 mg tablet   No No   Sig: TAKE 1 TABLET BY MOUTH DAILY AT BEDTIME   albuterol (2.5 mg/3 mL) 0.083 % nebulizer solution   No No   Sig: Take 3 mL (2.5 mg total) by nebulization every 6 (six) hours as needed for wheezing or shortness of breath   albuterol (PROVENTIL HFA,VENTOLIN HFA) 90 mcg/act inhaler   No No   Sig: Inhale 2 puffs every 6 (six) hours as needed for wheezing or shortness of breath   Patient not taking: Reported on 1/25/2025   albuterol (ProAir  HFA) 90 mcg/act inhaler  Self No No   Sig: Inhale 2 puffs every 6 (six) hours as needed for wheezing   benzonatate (TESSALON PERLES) 100 mg capsule   No No   Sig: Take 1 capsule (100 mg total) by mouth 2 (two) times a day as needed for cough   Patient not taking: Reported on 1/25/2025   docusate sodium (COLACE) 100 mg capsule  Self No No   Sig: Take 1 capsule (100 mg total) by mouth 2 (two) times a day   Patient not taking: Reported on 1/25/2025   esomeprazole (NexIUM) 40 MG capsule   No No   Sig: TAKE 1 CAPSULE BY MOUTH EVERY DAY BEFORE BREAKFAST   famotidine (PEPCID) 20 mg tablet  Self No No   Sig: Take 1 tablet (20 mg total) by mouth daily as needed for heartburn   Patient not taking: Reported on 11/11/2024   gabapentin (NEURONTIN) 300 mg capsule  Self No No   Sig: Take 1 capsule (300 mg total) by mouth 3 (three) times a day   levothyroxine 50 mcg tablet   No No   Sig: TAKE 1 TABLET BY MOUTH EVERY DAY   methylPREDNISolone 4 MG tablet therapy pack  Self No No   Sig: Use as directed on package   Patient not taking: Reported on 11/11/2024   midodrine (PROAMATINE) 5 mg tablet  Self Yes No   Sig: Take 5 mg by mouth daily      Facility-Administered Medications: None     Discharge Medication List as of 1/25/2025  9:07 PM        START taking these medications    Details   cyclobenzaprine (FLEXERIL) 10 mg tablet Take 1 tablet (10 mg total) by mouth 2 (two) times a day as needed (neck pain) for up to 7 days, Starting Sat 1/25/2025, Until Sat 2/1/2025 at 2359, Normal      oxyCODONE (ROXICODONE) 5 immediate release tablet Take 1 tablet (5 mg total) by mouth 3 (three) times a day as needed for severe pain for up to 10 days Max Daily Amount: 15 mg, Starting Sat 1/25/2025, Until Tue 2/4/2025 at 2359, Normal      predniSONE 10 mg tablet 5 po daily x2 days, then 4 po daily x2 days, then 3 po daily x2 days, then 2 po daily x2 days,then 1 po daily x2days, Normal           CONTINUE these medications which have NOT CHANGED    Details    albuterol (2.5 mg/3 mL) 0.083 % nebulizer solution Take 3 mL (2.5 mg total) by nebulization every 6 (six) hours as needed for wheezing or shortness of breath, Starting Sat 12/21/2024, Normal      !! albuterol (ProAir HFA) 90 mcg/act inhaler Inhale 2 puffs every 6 (six) hours as needed for wheezing, Starting Thu 8/8/2024, Normal      !! albuterol (PROVENTIL HFA,VENTOLIN HFA) 90 mcg/act inhaler Inhale 2 puffs every 6 (six) hours as needed for wheezing or shortness of breath, Starting Sat 12/21/2024, Print      benzonatate (TESSALON PERLES) 100 mg capsule Take 1 capsule (100 mg total) by mouth 2 (two) times a day as needed for cough, Starting Fri 12/27/2024, Normal      Calcium Carb-Cholecalciferol 600-12.5 MG-MCG CAPS Take by mouth daily in the early morning, Historical Med      Denta 5000 Plus 1.1 % CREA USE ONCE DAILY AT NIGHT, Historical Med      docusate sodium (COLACE) 100 mg capsule Take 1 capsule (100 mg total) by mouth 2 (two) times a day, Starting Wed 2/7/2024, Normal      DULoxetine (CYMBALTA) 60 mg delayed release capsule TAKE 1 CAPSULE BY MOUTH EVERY DAY, Starting Mon 11/11/2024, Normal      esomeprazole (NexIUM) 40 MG capsule TAKE 1 CAPSULE BY MOUTH EVERY DAY BEFORE BREAKFAST, Starting Thu 12/5/2024, Normal      famotidine (PEPCID) 20 mg tablet Take 1 tablet (20 mg total) by mouth daily as needed for heartburn, Starting Fri 4/19/2024, Normal      Fluticasone-Salmeterol (Wixela Inhub) 100-50 mcg/dose inhaler Inhale 1 puff 2 (two) times a day Rinse mouth after use., Starting Mon 11/11/2024, Normal      gabapentin (NEURONTIN) 300 mg capsule Take 1 capsule (300 mg total) by mouth 3 (three) times a day, Starting Fri 7/12/2024, Normal      levothyroxine 50 mcg tablet TAKE 1 TABLET BY MOUTH EVERY DAY, Starting Fri 1/3/2025, Normal      methylPREDNISolone 4 MG tablet therapy pack Use as directed on package, Normal      midodrine (PROAMATINE) 5 mg tablet Take 5 mg by mouth daily, Starting Mon 4/29/2024,  Historical Med      QUEtiapine (SEROquel) 100 mg tablet TAKE 1 TABLET BY MOUTH DAILY AT BEDTIME, Starting Mon 1/20/2025, Normal       !! - Potential duplicate medications found. Please discuss with provider.        Outpatient Discharge Orders   MRI cervical spine wo contrast   Standing Status: Future Standing Exp. Date: 01/25/29     ED SEPSIS DOCUMENTATION   Time reflects when diagnosis was documented in both MDM as applicable and the Disposition within this note       Time User Action Codes Description Comment    1/25/2025  9:00 PM Neha Izquierdo Add [M54.12] Cervical radiculopathy                  Neha Izquierdo MD  01/26/25 3306

## 2025-01-25 NOTE — ED NOTES
States since [facility] having more pain left arm and her hand wants to close ( left ). Dr. Negron evaluates for stroke in triage and no stroke alert, patient may go to waiting room. She is instructed to alert staff immediately of any changes.      Maura Ramirez RN  01/25/25 1456

## 2025-01-25 NOTE — PROGRESS NOTES
[facility]        NAME: Ina Tolbert is a 69 y.o. female  : 1955    MRN: [id number]  DATE: 2025  TIME: 3:43 PM    Assessment and Plan   Cervical radiculopathy [M54.12]  1. Cervical radiculopathy  XR spine cervical complete 4 or 5 vw non injury    XR spine cervical complete 4 or 5 vw non injury    Transfer to other facility        Cervical spine x-ray shows fusion hardware which appears intact when compared to CTA of 2024.  However there is degenerative disc disease present.  Official read pending.    Clinically she has experienced progressively worsening pain of the left upper extremity and control of the left hand.  Due to this, will send patient to the ED for further evaluation and management.    Patient Instructions     Follow up with PCP in 3-5 days.  Proceed to  ER if symptoms worsen.    If tests have been performed at [facility], our office will contact you with results if changes need to be made to the care plan discussed with you at the visit.  You can review your full results on [facility] MyChart.    Chief Complaint     Chief Complaint   Patient presents with    Neck Pain     Reports neck pain x 3 days. Applied ice and heat that was ineffective. Now reporting left arm pain and hand is tingling and numb. Difficulty turning her head that is worsening. Has hx of cervical spine surgeries. Using Tylenol that was ineffective.          History of Present Illness       69-year-old right-hand-dominant female with pertinent history of osteoporosis, spinal stenosis and DDD s/p C3-C4 and C4-C5 spinal fusions presents today with new onset neck pain that started about 2 days ago and has progressively worsened.  Denies any obvious trauma recently.  Reports taking OTC pain relievers and applying warm compress.  Pain started on the left side of the neck and has now worsened with involvement of the left upper extremity.    Neck Pain   Associated symptoms include numbness and weakness.  Pertinent negatives include no chest pain or fever.       Review of Systems   Review of Systems   Constitutional:  Negative for chills and fever.   Respiratory:  Negative for cough and shortness of breath.    Cardiovascular:  Negative for chest pain.   Gastrointestinal:  Negative for abdominal pain and nausea.   Musculoskeletal:  Positive for neck pain and neck stiffness.   Neurological:  Positive for weakness and numbness.     Current Medications       Current Outpatient Medications:     albuterol (2.5 mg/3 mL) 0.083 % nebulizer solution, Take 3 mL (2.5 mg total) by nebulization every 6 (six) hours as needed for wheezing or shortness of breath, Disp: 75 mL, Rfl: 0    albuterol (ProAir HFA) 90 mcg/act inhaler, Inhale 2 puffs every 6 (six) hours as needed for wheezing, Disp: 8.5 g, Rfl: 6    Calcium Carb-Cholecalciferol 600-12.5 MG-MCG CAPS, Take by mouth daily in the early morning, Disp: , Rfl:     Denta 5000 Plus 1.1 % CREA, USE ONCE DAILY AT NIGHT, Disp: , Rfl:     DULoxetine (CYMBALTA) 60 mg delayed release capsule, TAKE 1 CAPSULE BY MOUTH EVERY DAY, Disp: 90 capsule, Rfl: 1    esomeprazole (NexIUM) 40 MG capsule, TAKE 1 CAPSULE BY MOUTH EVERY DAY BEFORE BREAKFAST, Disp: 90 capsule, Rfl: 1    Fluticasone-Salmeterol (Wixela Inhub) 100-50 mcg/dose inhaler, Inhale 1 puff 2 (two) times a day Rinse mouth after use., Disp: 60 blister, Rfl: 3    gabapentin (NEURONTIN) 300 mg capsule, Take 1 capsule (300 mg total) by mouth 3 (three) times a day, Disp: 270 capsule, Rfl: 3    levothyroxine 50 mcg tablet, TAKE 1 TABLET BY MOUTH EVERY DAY, Disp: 90 tablet, Rfl: 1    midodrine (PROAMATINE) 5 mg tablet, Take 5 mg by mouth daily, Disp: , Rfl:     QUEtiapine (SEROquel) 100 mg tablet, TAKE 1 TABLET BY MOUTH DAILY AT BEDTIME, Disp: 90 tablet, Rfl: 1    albuterol (PROVENTIL HFA,VENTOLIN HFA) 90 mcg/act inhaler, Inhale 2 puffs every 6 (six) hours as needed for wheezing or shortness of breath (Patient not taking: Reported on  1/25/2025), Disp: 18 g, Rfl: 0    benzonatate (TESSALON PERLES) 100 mg capsule, Take 1 capsule (100 mg total) by mouth 2 (two) times a day as needed for cough (Patient not taking: Reported on 1/25/2025), Disp: 21 capsule, Rfl: 0    docusate sodium (COLACE) 100 mg capsule, Take 1 capsule (100 mg total) by mouth 2 (two) times a day (Patient not taking: Reported on 1/25/2025), Disp: 60 capsule, Rfl: 0    famotidine (PEPCID) 20 mg tablet, Take 1 tablet (20 mg total) by mouth daily as needed for heartburn (Patient not taking: Reported on 11/11/2024), Disp: 90 tablet, Rfl: 3    methylPREDNISolone 4 MG tablet therapy pack, Use as directed on package (Patient not taking: Reported on 11/11/2024), Disp: 21 tablet, Rfl: 0    Current Allergies     Allergies as of 01/25/2025 - Reviewed 01/25/2025   Allergen Reaction Noted    Meperidine Lightheadedness and Other (See Comments) 01/11/2006    Sulfa antibiotics Hives 01/11/2006            The following portions of the patient's history were reviewed and updated as appropriate: allergies, current medications, past family history, past medical history, past social history, past surgical history and problem list.     Past Medical History:   Diagnosis Date    Abdominal adhesions     Alcoholism ([facility]) 1996    in remission    Anesthesia complication     difficult to wake up    Anxiety     Arthritis     Asthma     with exertion    Cancer (HCC)     melanoma    Colon polyp     Crohn's disease (HCC)     Depression     Depression     Disease of thyroid gland     Fibromyalgia     Fibromyalgia, primary     Fractures 2006    GERD (gastroesophageal reflux disease)     History of cholecystectomy 09/07/2019    Hyperlipidemia     Infected tooth 01/2024    Tooth #14-was treated with antibiotics-endodontal appt 2/1 may need root canal    Irregular heart beat     can be tachy; also has orthoststic hypotension    Irritable bowel syndrome 1990    Lazy eye     resolved: 3/27/17    Medical clearance for  psychiatric admission 07/13/2021    Melanoma ([facility]) 2010    Mild asthma 11/04/2020    Osteoarthritis 07/2018    Osteopenia     with joint pain-elevated DEV    Polymyalgia (HCC) 2/10/2022    Psychiatric disorder     Shortness of breath     assoc with tachycardia    Stomach disorder 1995    Tinnitus     Wears glasses     for reading       Past Surgical History:   Procedure Laterality Date    ABDOMINAL SURGERY      lysis of adhesions x 2    APPENDECTOMY      CERVICAL FUSION      May 5,2021 and Jan. 01,2020    CHOLECYSTECTOMY      open    COLONOSCOPY      DILATION AND CURETTAGE OF UTERUS      FOOT SURGERY  05/2017    NECK SURGERY  01/2019 5/2021    NEUROMA EXCISION Right 05/26/2017    Procedure: EXCISION MASS / FIBROMA FOOT;  Surgeon: Jorden Crespo DPM;  Location: [facility]IN [facility];  Service:     PARS PLANA VITRECTOMY W/ REPAIR OF MACULAR HOLE  12/23/2020    NC ARTHRP KNE CONDYLE&PLATU MEDIAL&LAT COMPARTMENTS Right 2/6/2024    Procedure: ARTHROPLASTY KNEE TOTAL W ROBOT - RIGHT - PRESS FIT - OVERNIGHT;  Surgeon: Jairon Kim DO;  Location: [facility];  Service: Orthopedics    NC XCAPSL CTRC RMVL INSJ IO LENS PROSTH W/O ECP Left 12/06/2021    Procedure: EXTRACTION EXTRACAPSULAR CATARACT PHACO INTRAOCULAR LENS (IOL);  Surgeon: Mandeep Chavez MD;  Location: [facility] MAIN OR;  Service: Ophthalmology    RIGHT OOPHORECTOMY      UPPER GASTROINTESTINAL ENDOSCOPY  5/2019    WISDOM TOOTH EXTRACTION      x4       Family History   Problem Relation Age of Onset    Heart disease Mother     Stroke Mother 62    COPD Mother     Arthritis Mother     Asthma Mother     Hypertension Father     Dislocations Sister     Multiple sclerosis Sister     Neurological problems Sister     Scoliosis Sister     Depression Sister     Mental illness Sister     No Known Problems Maternal Grandmother     No Known Problems Maternal Grandfather     No Known Problems Paternal Grandmother     No Known Problems Paternal Grandfather     Kidney disease Brother          kidney transplant    No Known Problems Maternal Aunt     No Known Problems Maternal Uncle     No Known Problems Paternal Aunt     No Known Problems Paternal Uncle     ADD / ADHD Neg Hx     Anesthesia problems Neg Hx     Cancer Neg Hx     Clotting disorder Neg Hx     Collagen disease Neg Hx     Diabetes Neg Hx     Dislocations Neg Hx     Learning disabilities Neg Hx     Neurological problems Neg Hx     Osteoporosis Neg Hx     Rheumatologic disease Neg Hx     Scoliosis Neg Hx     Vascular Disease Neg Hx          Medications have been verified.        Objective   /82   Pulse 102   Temp 98 °F (36.7 °C) (Tympanic)   Resp 16   Wt 76.7 kg (169 lb)   LMP  (LMP Unknown)   SpO2 98%   BMI 28.12 kg/m²   No LMP recorded (lmp unknown). Patient is postmenopausal.       Physical Exam     Physical Exam  Vitals and nursing note reviewed.   Constitutional:       General: She is in acute distress.      Appearance: Normal appearance. She is obese. She is not ill-appearing, toxic-appearing or diaphoretic.   HENT:      Head: Normocephalic and atraumatic.   Eyes:      General:         Right eye: No discharge.         Left eye: No discharge.      Conjunctiva/sclera: Conjunctivae normal.   Pulmonary:      Effort: Pulmonary effort is normal.   Musculoskeletal:         General: Tenderness present. No swelling, deformity or signs of injury.      Comments: Actively extends the left fingers and is able to make a fist, but struggles with controlling finger movement.   Skin:     General: Skin is warm.      Findings: No erythema.   Neurological:      General: No focal deficit present.      Mental Status: She is alert and oriented to person, place, and time.   Psychiatric:         Mood and Affect: Mood normal.         Behavior: Behavior normal.         Thought Content: Thought content normal.         Judgment: Judgment normal.

## 2025-01-26 NOTE — DISCHARGE INSTRUCTIONS
Rest as needed.  You can use a soft neck collar for support and may help with the pain.  Alternate ice and heat to the area.  Take the medicines as prescribed.  Get the MRI done.  See your neurosurgeon at [facility] or one of the [facility] spine doctors.

## 2025-01-27 ENCOUNTER — TELEPHONE (OUTPATIENT)
Dept: PHYSICAL THERAPY | Facility: OTHER | Age: 70
End: 2025-01-27

## 2025-01-29 NOTE — TELEPHONE ENCOUNTER
Patient called into [facility] today 01/29 and left v/m @12:36pm regarding a message she received yesterday from triage nurse about her referral due to neck pain.    Returned patient's call @1:13pm. V/M left for patient, explaining program, protocol and what we offer.     I also explained she has a DIRECT PT-SPINE REFERRAL FOR CERVICAL SPINE STENOSIS. Referral is active/pending since July. No appt scheduled yet. I provided the [facility] phone number for patient to call directly and schedule her evaluation.     [facility] referral closed per protocol (duplicate).

## 2025-02-03 ENCOUNTER — OFFICE VISIT (OUTPATIENT)
Dept: PHYSICAL THERAPY | Facility: CLINIC | Age: 70
End: 2025-02-03
Payer: COMMERCIAL

## 2025-02-03 DIAGNOSIS — M48.02 CERVICAL SPINAL STENOSIS: Primary | ICD-10-CM

## 2025-02-03 PROCEDURE — 97161 PT EVAL LOW COMPLEX 20 MIN: CPT | Performed by: PHYSICAL THERAPIST

## 2025-02-03 NOTE — PROGRESS NOTES
PT Evaluation     Today's date: 2/3/2025  Patient name: Ina Tolbert  : 1955  MRN: [id number]  Referring provider: Ross Sahu  Dx:   Encounter Diagnosis     ICD-10-CM    1. Cervical spinal stenosis  M48.02 Ambulatory Referral to [facility]    Follow up with NeuroSurgery re: onset of neck pain  PT for onset of neck pain          Start Time: 1540  Stop Time: 1620  Total time in clinic (min): 40 minutes    Assessment  Impairments: abnormal or restricted ROM, abnormal movement, impaired physical strength, lacks appropriate home exercise program, pain with function and poor posture   Functional limitations: limited head movements with driving and using LUE  Symptom irritability: low    Assessment details: Ina Tolbert is a 69 y.o. female who presents with complaints of Cervical spinal stenosis  (primary encounter diagnosis).  No further referral appears necessary at this time based upon examination results.  Patient is presenting with decreased cervical rotation and extension leading to limitations with turning head while driving, gripping with LUE, and carrying items of weight with LUE.  Prognosis is good given HEP compliance and PT 1-2x/wk.  Positive prognostic indicators include positive attitude toward recovery.   Please contact me if you have any questions or recommendations.  Thank you for the opportunity to share in Ina's care.       Understanding of Dx/Px/POC: good     Prognosis: good    Plan  Patient would benefit from: skilled physical therapy    Planned therapy interventions: joint mobilization, manual therapy, patient education, postural training, strengthening, stretching, therapeutic activities, therapeutic exercise, home exercise program, neuromuscular re-education, flexibility and functional ROM exercises    Frequency: 1-2x week  Duration in weeks: 8  Treatment plan discussed with: patient    Short Term:  Pt will report decreased levels of pain by at least 2  subjective ratings in 4 weeks  Pt will demonstrate improved ROM by at least 10 degrees in 4 weeks  Pt will demonstrate improved strength by 1/2 grade in 4 weeks.  Pt will be able to turn head while driving without pain in 4 weeks  Long Term:   Pt will be independent in their HEP in 8 weeks  Pt will be pain free with IADL's in 8 weeks.  Pt will demonstrate full active cervical ROM in 8 weeks.   Pt will be able to return to 5/5 MMT left  strength in 8 weeks       Subjective Evaluation    History of Present Illness  Mechanism of injury: Patient reports about 2 weeks ago she started getting pain in her neck and bilateral shoulders. Hx of 2 cervical fusions. Left hand started curling at this time as well. Has started medication and injection of steroid. Less pain now in her neck and sleeping has improved. Reports tingling/numbness left 2-5 digits and left lateral arm pain. Not dropping items at this time. Patient is RHD. Unable to open a jar as  has changed.Was doing well after her cervical fusion up until 2 weeks ago. No changes in balance reported. Does report burning in left foot at night time. Work for her to open her hand and is now having trouble turning her head while driving.     Patient denies dizziness, dsyphagia, dysarthria, diplopia, drop attacks, nystagmus, facial numbness, nor nausea.              Not a recurrent problem   Quality of life: good    Patient Goals  Patient goals for therapy: decreased pain, increased motion and increased strength  Patient goal: improve cervical motion, using left UE, and carrying items with left hand  Pain  Current pain ratin  At worst pain ratin  Location: right cervical region  Quality: burning  Aggravating factors: overhead activity (head movements)  Progression: improved      Diagnostic Tests  MRI studies: abnormal (Patient is also gone through cervical anterior fusion from C3-5 with persistent left C4-6 foraminal stenosis indicating possible radiculitis.   She has been evaluated by neurosurgery for this.)      Objective    Cervical % of normal   Flex. 75   Extn. 25   SB Left 0p!   SB Right 25   ROT Left 25   ROT Right 50   Repetitive testing: retraction= improves,   extension= improves rotation             MMT         AROM    Shoulder       R       L        R          L   Flex. 4 4 WNL WNL   Extn.       Abd. 4 4 WNL WNL   Add.       IR. 5 5     ER. 5 5                  MMT    Elbow       R       L   Flex. 4 4   Extn. 4 4              MMT    Wrist       R         L   Flex. 4+ 4+   Extn. 4+ 4+    bent 5 3+     Head positioning: forward head posture  Palpation:    Sensation (pinprick L/R):       C8: decreased LUE     T1:   decreased  LUE       Insurance:  AMA/CMS Eval/ Re-eval POC expires Auth #/ Referral # Total    Start date  Expiration date Extension  Visit limitation?  PT only or  PT+OT? Co-Insurance   CMS 2.3.25 3.31.25      BOMN                    AUTH #:  Date 2.3              Authed: Used 1               Remaining                    Precautions: orthostatic hypotension, TIA, migraines, HTN, OP, hx of cervical surgery  Patient provided verbal consent to treatment plan and recommended interventions.      Manuals 2/3/25        visit 1                                   Neuro Re-Ed                                                                        Ther Ex         Cerv. retraction 5x10        Cerv extn rep 2x10                                                              Ther Activity         Pt ed FB

## 2025-02-06 ENCOUNTER — OFFICE VISIT (OUTPATIENT)
Dept: PHYSICAL THERAPY | Facility: CLINIC | Age: 70
End: 2025-02-06
Payer: COMMERCIAL

## 2025-02-06 ENCOUNTER — OFFICE VISIT (OUTPATIENT)
Dept: OBGYN CLINIC | Facility: CLINIC | Age: 70
End: 2025-02-06
Payer: COMMERCIAL

## 2025-02-06 ENCOUNTER — APPOINTMENT (OUTPATIENT)
Dept: RADIOLOGY | Facility: CLINIC | Age: 70
End: 2025-02-06
Payer: COMMERCIAL

## 2025-02-06 VITALS — BODY MASS INDEX: 28.29 KG/M2 | HEIGHT: 65 IN | WEIGHT: 169.8 LBS

## 2025-02-06 DIAGNOSIS — Z47.1 AFTERCARE FOLLOWING RIGHT KNEE JOINT REPLACEMENT SURGERY: ICD-10-CM

## 2025-02-06 DIAGNOSIS — Z96.651 AFTERCARE FOLLOWING RIGHT KNEE JOINT REPLACEMENT SURGERY: ICD-10-CM

## 2025-02-06 DIAGNOSIS — M48.02 CERVICAL SPINAL STENOSIS: Primary | ICD-10-CM

## 2025-02-06 DIAGNOSIS — Z96.651 S/P TOTAL KNEE REPLACEMENT NOT USING CEMENT, RIGHT: Primary | ICD-10-CM

## 2025-02-06 DIAGNOSIS — Z96.651 S/P TOTAL KNEE REPLACEMENT NOT USING CEMENT, RIGHT: ICD-10-CM

## 2025-02-06 PROCEDURE — 97530 THERAPEUTIC ACTIVITIES: CPT | Performed by: PHYSICAL THERAPIST

## 2025-02-06 PROCEDURE — 73562 X-RAY EXAM OF KNEE 3: CPT

## 2025-02-06 PROCEDURE — 99214 OFFICE O/P EST MOD 30 MIN: CPT | Performed by: ORTHOPAEDIC SURGERY

## 2025-02-06 PROCEDURE — 97110 THERAPEUTIC EXERCISES: CPT | Performed by: PHYSICAL THERAPIST

## 2025-02-06 NOTE — PROGRESS NOTES
Daily Note     Today's date: 2025  Patient name: Ina Tolbert  : 1955  MRN: [id number]  Referring provider: Ross Sahu  Dx:   Encounter Diagnosis     ICD-10-CM    1. Cervical spinal stenosis  M48.02                  Subjective: Patient reports no longer having symptoms into hand and ROM is improved. Glad with her progress.     Objective: See treatment diary below    Assessment: Tolerated treatment well. Patient  demonstrated improved cervical rotation with addition of Snags today and repeated cervical extension. Cueing needed for proper performance of new exercises added.     Plan: Continue per plan of care.        Insurance:  AMA/CMS Eval/ Re-eval POC expires Auth #/ Referral # Total    Start date  Expiration date Extension  Visit limitation?  PT only or  PT+OT? Co-Insurance   CMS 2.3.25 3.31.25      BOMN                    AUTH #:  Date 2.3 2.6             Authed: Used 1 2              Remaining  11 10               Precautions: orthostatic hypotension, TIA, migraines, HTN, OP, hx of cervical surgery  Patient provided verbal consent to treatment plan and recommended interventions.      Manuals 2/3/25 2/6/25       visit 1 2                                  Neuro Re-Ed                                                                        Ther Ex         Cerv. retraction 5x10 2x10       Cerv extn rep 2x10 2x10       Snags to left  2x10                                                    Ther Activity         Pt ed FB FB

## 2025-02-06 NOTE — PROGRESS NOTES
Assessment/Plan:  1. S/P total knee replacement not using cement, right  XR knee 3 vw right non injury      2. Aftercare following right knee joint replacement surgery  XR knee 3 vw right non injury        Scribe Attestation      I,:  Kong Zarate am acting as a scribe while in the presence of the attending physician.:       I,:  Jairon Kim, DO personally performed the services described in this documentation    as scribed in my presence.:           Ina is a pleasant 69-year-old female who returns today for follow-up evaluation 1 year status post right total knee arthroplasty. I am pleased with her imaging and clinical presentation today in the office. She may continue with activity to her tolerance. We discussed the continued need for prophylactic antibiotics prior to dental procedures moving forward. All of her questions and concerns were addressed today. We will plan to see her back as needed.     Subjective: Follow-up evaluation 1 year status post right total knee arthroplasty    Patient ID: Ina Tolbert is a 69 y.o. female who returns today for follow-up evaluation 1 year status post right total knee arthroplasty. She reports she has been doing well. She has been participating in all desired activity without limitation or pain in the knee. She is pleased with her outcome. She denies any new injury or trauma.     Review of Systems   Constitutional:  Negative for activity change, chills, fever and unexpected weight change.   HENT:  Negative for hearing loss, nosebleeds and sore throat.    Eyes:  Negative for pain, redness and visual disturbance.   Respiratory:  Negative for cough, shortness of breath and wheezing.    Cardiovascular:  Negative for chest pain, palpitations and leg swelling.   Gastrointestinal:  Negative for abdominal pain, nausea and vomiting.   Endocrine: Negative for polydipsia and polyuria.   Genitourinary:  Negative for dysuria and hematuria.   Musculoskeletal:  Negative for  arthralgias, joint swelling and myalgias.        See HPI   Skin:  Negative for rash and wound.   Neurological:  Negative for dizziness, numbness and headaches.   Psychiatric/Behavioral:  Negative for decreased concentration and suicidal ideas. The patient is not nervous/anxious.          Past Medical History:   Diagnosis Date    Abdominal adhesions     Alcoholism ([facility]) 1996    in remission    Anesthesia complication     difficult to wake up    Anxiety     Arthritis     Asthma     with exertion    Cancer (HCC)     melanoma    Colon polyp     Crohn's disease ([facility])     Depression     Depression     Disease of thyroid gland     Fibromyalgia     Fibromyalgia, primary     Fractures 2006    GERD (gastroesophageal reflux disease)     History of cholecystectomy 09/07/2019    Hyperlipidemia     Infected tooth 01/2024    Tooth #14-was treated with antibiotics-endodontal appt 2/1 may need root canal    Irregular heart beat     can be tachy; also has orthoststic hypotension    Irritable bowel syndrome 1990    Lazy eye     resolved: 3/27/17    Medical clearance for psychiatric admission 07/13/2021    Melanoma ([facility]) 2010    Mild asthma 11/04/2020    Osteoarthritis 07/2018    Osteopenia     with joint pain-elevated DEV    Polymyalgia ([facility]) 2/10/2022    Psychiatric disorder     Shortness of breath     assoc with tachycardia    Stomach disorder 1995    Tinnitus     Wears glasses     for reading       Past Surgical History:   Procedure Laterality Date    ABDOMINAL SURGERY      lysis of adhesions x 2    APPENDECTOMY      CERVICAL FUSION      May 5,2021 and Jan. 01,2020    CHOLECYSTECTOMY      open    COLONOSCOPY      DILATION AND CURETTAGE OF UTERUS      FOOT SURGERY  05/2017    NECK SURGERY  01/2019 5/2021    NEUROMA EXCISION Right 05/26/2017    Procedure: EXCISION MASS / FIBROMA FOOT;  Surgeon: Jorden Crespo DPM;  Location: [facility]IN [facility];  Service:     PARS PLANA VITRECTOMY W/ REPAIR OF MACULAR HOLE  12/23/2020    IL ARTHRP KALEE  CONDYLE&PLATU MEDIAL&LAT COMPARTMENTS Right 2/6/2024    Procedure: ARTHROPLASTY KNEE TOTAL W ROBOT - RIGHT - PRESS FIT - OVERNIGHT;  Surgeon: Jairon Kim DO;  Location: [facility];  Service: Orthopedics    WI XCAPSL CTRC RMVL INSJ IO LENS PROSTH W/O ECP Left 12/06/2021    Procedure: EXTRACTION EXTRACAPSULAR CATARACT PHACO INTRAOCULAR LENS (IOL);  Surgeon: Mandeep Chavez MD;  Location: [facility] MAIN OR;  Service: Ophthalmology    RIGHT OOPHORECTOMY      UPPER GASTROINTESTINAL ENDOSCOPY  5/2019    WISDOM TOOTH EXTRACTION      x4       Family History   Problem Relation Age of Onset    Heart disease Mother     Stroke Mother 62    COPD Mother     Arthritis Mother     Asthma Mother     Hypertension Father     Dislocations Sister     Multiple sclerosis Sister     Neurological problems Sister     Scoliosis Sister     Depression Sister     Mental illness Sister     No Known Problems Maternal Grandmother     No Known Problems Maternal Grandfather     No Known Problems Paternal Grandmother     No Known Problems Paternal Grandfather     Kidney disease Brother         kidney transplant    No Known Problems Maternal Aunt     No Known Problems Maternal Uncle     No Known Problems Paternal Aunt     No Known Problems Paternal Uncle     ADD / ADHD Neg Hx     Anesthesia problems Neg Hx     Cancer Neg Hx     Clotting disorder Neg Hx     Collagen disease Neg Hx     Diabetes Neg Hx     Dislocations Neg Hx     Learning disabilities Neg Hx     Neurological problems Neg Hx     Osteoporosis Neg Hx     Rheumatologic disease Neg Hx     Scoliosis Neg Hx     Vascular Disease Neg Hx        Social History     Occupational History    Not on file   Tobacco Use    Smoking status: Never     Passive exposure: Never    Smokeless tobacco: Never   Vaping Use    Vaping status: Never Used   Substance and Sexual Activity    Alcohol use: Not Currently    Drug use: No    Sexual activity: Not Currently     Partners: Male         Current Outpatient Medications:      albuterol (2.5 mg/3 mL) 0.083 % nebulizer solution, Take 3 mL (2.5 mg total) by nebulization every 6 (six) hours as needed for wheezing or shortness of breath, Disp: 75 mL, Rfl: 0    albuterol (ProAir HFA) 90 mcg/act inhaler, Inhale 2 puffs every 6 (six) hours as needed for wheezing, Disp: 8.5 g, Rfl: 6    Calcium Carb-Cholecalciferol 600-12.5 MG-MCG CAPS, Take by mouth daily in the early morning, Disp: , Rfl:     Denta 5000 Plus 1.1 % CREA, USE ONCE DAILY AT NIGHT, Disp: , Rfl:     DULoxetine (CYMBALTA) 60 mg delayed release capsule, TAKE 1 CAPSULE BY MOUTH EVERY DAY, Disp: 90 capsule, Rfl: 1    esomeprazole (NexIUM) 40 MG capsule, TAKE 1 CAPSULE BY MOUTH EVERY DAY BEFORE BREAKFAST, Disp: 90 capsule, Rfl: 1    Fluticasone-Salmeterol (Wixela Inhub) 100-50 mcg/dose inhaler, Inhale 1 puff 2 (two) times a day Rinse mouth after use., Disp: 60 blister, Rfl: 3    gabapentin (NEURONTIN) 300 mg capsule, Take 1 capsule (300 mg total) by mouth 3 (three) times a day, Disp: 270 capsule, Rfl: 3    levothyroxine 50 mcg tablet, TAKE 1 TABLET BY MOUTH EVERY DAY, Disp: 90 tablet, Rfl: 1    midodrine (PROAMATINE) 5 mg tablet, Take 5 mg by mouth daily, Disp: , Rfl:     QUEtiapine (SEROquel) 100 mg tablet, TAKE 1 TABLET BY MOUTH DAILY AT BEDTIME, Disp: 90 tablet, Rfl: 1    albuterol (PROVENTIL HFA,VENTOLIN HFA) 90 mcg/act inhaler, Inhale 2 puffs every 6 (six) hours as needed for wheezing or shortness of breath (Patient not taking: Reported on 1/20/2025), Disp: 18 g, Rfl: 0    benzonatate (TESSALON PERLES) 100 mg capsule, Take 1 capsule (100 mg total) by mouth 2 (two) times a day as needed for cough (Patient not taking: Reported on 2/6/2025), Disp: 21 capsule, Rfl: 0    cyclobenzaprine (FLEXERIL) 10 mg tablet, Take 1 tablet (10 mg total) by mouth 2 (two) times a day as needed (neck pain) for up to 7 days, Disp: 14 tablet, Rfl: 0    docusate sodium (COLACE) 100 mg capsule, Take 1 capsule (100 mg total) by mouth 2 (two) times a day  (Patient not taking: Reported on 1/25/2025), Disp: 60 capsule, Rfl: 0    famotidine (PEPCID) 20 mg tablet, Take 1 tablet (20 mg total) by mouth daily as needed for heartburn (Patient not taking: Reported on 11/11/2024), Disp: 90 tablet, Rfl: 3    methylPREDNISolone 4 MG tablet therapy pack, Use as directed on package (Patient not taking: Reported on 11/11/2024), Disp: 21 tablet, Rfl: 0    predniSONE 10 mg tablet, 5 po daily x2 days, then 4 po daily x2 days, then 3 po daily x2 days, then 2 po daily x2 days,then 1 po daily x2days (Patient not taking: Reported on 2/6/2025), Disp: 30 tablet, Rfl: 0    Allergies   Allergen Reactions    Meperidine Lightheadedness and Other (See Comments)    Sulfa Antibiotics Hives       Objective:  There were no vitals filed for this visit.    Body mass index is 28.26 kg/m².    Right Knee Exam     Tenderness   The patient is experiencing no tenderness.     Range of Motion   Extension:  0   Flexion:  130     Tests   Varus: negative Valgus: negative    Other   Erythema: absent  Scars: present (well healed vertical midline incision)  Sensation: normal  Pulse: present  Swelling: none  Effusion: no effusion present    Comments:  Stable at 0, 30 and 90 degrees  Neurovascular intact distally  No warmth erythema  Patella tracks flat and midline          Observations     Right Knee   Negative for effusion.       Physical Exam  Vitals and nursing note reviewed.   Constitutional:       Appearance: Normal appearance. She is well-developed.   HENT:      Head: Normocephalic and atraumatic.      Right Ear: External ear normal.      Left Ear: External ear normal.   Eyes:      General: No scleral icterus.     Extraocular Movements: Extraocular movements intact.      Conjunctiva/sclera: Conjunctivae normal.   Cardiovascular:      Rate and Rhythm: Normal rate.   Pulmonary:      Effort: Pulmonary effort is normal. No respiratory distress.   Musculoskeletal:      Cervical back: Normal range of motion and neck  supple.      Right knee: No effusion.      Comments: See Ortho exam   Skin:     General: Skin is warm and dry.   Neurological:      General: No focal deficit present.      Mental Status: She is alert and oriented to person, place, and time.   Psychiatric:         Behavior: Behavior normal.         I have personally reviewed pertinent films in PACS.    X-ray of the right knee obtained on 2/6/2025 reviewed demonstrating a well-positioned and aligned press-fit total knee arthroplasty without evidence of failure.  There is no new fracture, dislocation, lytic or blastic lesion.    This document was created using speech voice recognition software.   Grammatical errors, random word insertions, pronoun errors, and incomplete sentences are an occasional consequence of this system due to software limitations, ambient noise, and hardware issues.   Any formal questions or concerns about content, text, or information contained within the body of this dictation should be directly addressed to the provider for clarification.

## 2025-02-10 ENCOUNTER — APPOINTMENT (OUTPATIENT)
Dept: RADIOLOGY | Facility: CLINIC | Age: 70
End: 2025-02-10
Payer: COMMERCIAL

## 2025-02-10 ENCOUNTER — OFFICE VISIT (OUTPATIENT)
Dept: URGENT CARE | Facility: CLINIC | Age: 70
End: 2025-02-10
Payer: COMMERCIAL

## 2025-02-10 ENCOUNTER — OFFICE VISIT (OUTPATIENT)
Dept: PHYSICAL THERAPY | Facility: CLINIC | Age: 70
End: 2025-02-10
Payer: COMMERCIAL

## 2025-02-10 VITALS
DIASTOLIC BLOOD PRESSURE: 80 MMHG | RESPIRATION RATE: 16 BRPM | HEART RATE: 95 BPM | SYSTOLIC BLOOD PRESSURE: 140 MMHG | TEMPERATURE: 99.1 F | OXYGEN SATURATION: 95 % | BODY MASS INDEX: 28.16 KG/M2 | HEIGHT: 65 IN | WEIGHT: 169 LBS

## 2025-02-10 DIAGNOSIS — J40 BRONCHITIS: Primary | ICD-10-CM

## 2025-02-10 DIAGNOSIS — M48.02 CERVICAL SPINAL STENOSIS: Primary | ICD-10-CM

## 2025-02-10 DIAGNOSIS — R05.1 ACUTE COUGH: ICD-10-CM

## 2025-02-10 PROCEDURE — 97530 THERAPEUTIC ACTIVITIES: CPT | Performed by: PHYSICAL THERAPIST

## 2025-02-10 PROCEDURE — 71046 X-RAY EXAM CHEST 2 VIEWS: CPT

## 2025-02-10 PROCEDURE — 97110 THERAPEUTIC EXERCISES: CPT | Performed by: PHYSICAL THERAPIST

## 2025-02-10 PROCEDURE — 87636 SARSCOV2 & INF A&B AMP PRB: CPT | Performed by: PHYSICIAN ASSISTANT

## 2025-02-10 PROCEDURE — 99213 OFFICE O/P EST LOW 20 MIN: CPT | Performed by: PHYSICIAN ASSISTANT

## 2025-02-10 RX ORDER — ALBUTEROL SULFATE 0.83 MG/ML
2.5 SOLUTION RESPIRATORY (INHALATION) EVERY 6 HOURS PRN
Qty: 3 ML | Refills: 0 | Status: SHIPPED | OUTPATIENT
Start: 2025-02-10

## 2025-02-10 RX ORDER — BENZONATATE 100 MG/1
100 CAPSULE ORAL 3 TIMES DAILY PRN
Qty: 30 CAPSULE | Refills: 0 | Status: SHIPPED | OUTPATIENT
Start: 2025-02-10

## 2025-02-10 RX ORDER — AZITHROMYCIN 250 MG/1
TABLET, FILM COATED ORAL
Qty: 6 TABLET | Refills: 0 | Status: SHIPPED | OUTPATIENT
Start: 2025-02-10 | End: 2025-02-14

## 2025-02-10 RX ORDER — IPRATROPIUM BROMIDE AND ALBUTEROL SULFATE 2.5; .5 MG/3ML; MG/3ML
3 SOLUTION RESPIRATORY (INHALATION) ONCE
Status: COMPLETED | OUTPATIENT
Start: 2025-02-10 | End: 2025-02-10

## 2025-02-10 RX ADMIN — IPRATROPIUM BROMIDE AND ALBUTEROL SULFATE 3 ML: 2.5; .5 SOLUTION RESPIRATORY (INHALATION) at 16:29

## 2025-02-10 NOTE — PROGRESS NOTES
Daily Note     Today's date: 2/10/2025  Patient name: Ina Tolbert  : 1955  MRN: [id number]  Referring provider: Ross Sahu  Dx:   Encounter Diagnosis     ICD-10-CM    1. Cervical spinal stenosis  M48.02                  Subjective: Patient reports that she is coming down with something. Reports still having slight difficulty opening a jar or bottle. Reports no longer having hand symptoms.     Objective: See treatment diary below    Assessment: Tolerated treatment well. Patient reported feeling better with regards to ROM to her left after repeated cervical retraction with self OP. Ache R neck region with catching reported after performing repeated cervical retraction with extension. Patient to perform retraction with self OP for next day and if not improved then to perform self snags to left.     Plan: Continue per plan of care.        Insurance:  AMA/CMS Eval/ Re-eval POC expires Auth #/ Referral # Total    Start date  Expiration date Extension  Visit limitation?  PT only or  PT+OT? Co-Insurance   CMS 2.3.25 3.31.25      BOMN                    AUTH #:  Date 2.3 2 2.10            Authed: Used 1 2 3             Remaining  11 10 9              Precautions: orthostatic hypotension, TIA, migraines, HTN, OP, hx of cervical surgery  Patient provided verbal consent to treatment plan and recommended interventions.    Manuals 2/3/25 2/6/25 2/10      visit 1 2 3                                 Neuro Re-Ed                                    Ther Ex         Cerv. retraction 5x10 2x10 4x10, 2x10 with self OP      Cerv extn rep 2x10 2x10 3x10 with retraction 1st      Snags to left  2x10                                                    Ther Activity         Pt ed FB FB FB

## 2025-02-10 NOTE — PATIENT INSTRUCTIONS
"Patient Education     Acute bronchitis in adults   The Basics   Written by the doctors and editors at [facility]   What is bronchitis? -- This is an infection that causes a cough. It happens when the tubes that carry air into the lungs, called the \"bronchi,\" get infected (figure 1).  Usually, bronchitis happens after a person gets a cold or the flu. The viruses that cause the cold or flu infect the bronchi and irritate them. Antibiotics do not help bronchitis.  Bronchitis can also happen when a person gets an infection called \"whooping cough,\" but this is much less common. Whooping cough is caused by bacteria that can infect the bronchi. Most people get vaccines to prevent whooping cough, but the vaccine doesn't always work. Your doctor will be able to tell if you have whooping cough by doing an exam and listening to your cough.  This article is about \"acute\" bronchitis. This is different from \"chronic\" bronchitis, which is a lung disease that most often affects people who smoke.  What are the symptoms of bronchitis? -- The most common symptoms are:   A cough that can last up to a few weeks   Coughing up mucus that is clear, yellow, or green - Green mucus does not always mean that you have a bacterial infection.  You might also have other cold or flu symptoms, like a stuffy nose, sore throat, or headache. People with bronchitis do not usually get a fever.  Will I need tests? -- Most people with bronchitis do not need a test. But if your doctor or nurse is not sure what is causing your cough, they might do tests. For example, they might order a chest X-ray. Or if they think that you might have COVID-19, they will test you for the virus that causes the infection.  How is bronchitis treated? -- Bronchitis almost always goes away on its own. But the cough can take up to 3 weeks to get better, and sometimes even longer.  Doctors do not usually treat bronchitis with antibiotic medicines. That's because bronchitis is usually " caused by a virus, and antibiotics kill bacteria, not viruses. Also, antibiotics can actually cause other problems.  To feel better, you can treat your cold and flu symptoms. You can:   Get lots of rest, and drink plenty of liquids.   Drink hot tea.   Suck on cough drops or hard candy.   Take over-the-counter cough and cold medicines.   Breath in warm, moist air, such as in the shower, over a kettle, or from a humidifier.   Take a pain-relieving medicine if you have cold or flu symptoms like headache, muscle aches, or joint pain.  Avoid smoking or being around others who smoke. This can make your cough worse.  How can I keep from getting bronchitis again? -- You can reduce your chance of getting bronchitis again by keeping the germs that cause bronchitis out of your body. One of the best ways to do this is to wash your hands often with soap and water. If there is no sink nearby, you can use a hand gel with alcohol in it to clean your hands.  How can I keep from spreading germs? -- In addition to washing your hands often, cover your mouth with your elbow when you sneeze or cough. Using your elbow keeps you from getting germs on your hands. If you use a tissue, throw the tissue away and wash your hands.  When should I call the doctor? -- Call for advice if you have:   A fever higher than 100.4°F (38°C), or chills   Chest pain when you cough, trouble breathing, or coughing up blood   A barking cough that makes it hard to talk   Cough and weight loss that you cannot explain   Symptoms that are not getting better after 3 weeks  All topics are updated as new evidence becomes available and our peer review process is complete.  This topic retrieved from [employer] on: May 16, 2024.  Topic 63457 Version 17.0  Release: 32.4.3 - C32.135  © 2024 UpToDate, Inc. and/or its affiliates. All rights reserved.  figure 1: Normal lungs     The lungs sit in the chest, inside the ribcage. They are covered with a thin membrane called the  "\"pleura.\" The windpipe, or trachea, branches into 2 smaller airways called the left and right \"bronchi.\" The space between the lungs is called the \"mediastinum.\" Lymph nodes are located within and around the lungs and mediastinum.  Graphic 83905 Version 14.0  Consumer Information Use and Disclaimer   Disclaimer: This generalized information is a limited summary of diagnosis, treatment, and/or medication information. It is not meant to be comprehensive and should be used as a tool to help the user understand and/or assess potential diagnostic and treatment options. It does NOT include all information about conditions, treatments, medications, side effects, or risks that may apply to a specific patient. It is not intended to be medical advice or a substitute for the medical advice, diagnosis, or treatment of a health care provider based on the health care provider's examination and assessment of a patient's specific and unique circumstances. Patients must speak with a health care provider for complete information about their health, medical questions, and treatment options, including any risks or benefits regarding use of medications. This information does not endorse any treatments or medications as safe, effective, or approved for treating a specific patient. UpToDate, Inc. and its affiliates disclaim any warranty or liability relating to this information or the use thereof.The use of this information is governed by the Terms of Use, available at https://www.wolLookItuwer.com/en/know/clinical-effectiveness-terms. 2024© UpToDate, Inc. and its affiliates and/or licensors. All rights reserved.  Copyright   © 2024 UpToDate, Inc. and/or its affiliates. All rights reserved.    "

## 2025-02-10 NOTE — PROGRESS NOTES
"  [facility]        NAME: Ina Tolbert is a 69 y.o. female  : 1955    MRN: [id number]  DATE: February 10, 2025  TIME: 4:56 PM    Assessment and Plan   Bronchitis [J40]  1. Bronchitis  benzonatate (TESSALON PERLES) 100 mg capsule    azithromycin (ZITHROMAX) 250 mg tablet    albuterol (2.5 mg/3 mL) 0.083 % nebulizer solution      2. Acute cough  Covid/Flu- Office Collect Normal    ipratropium-albuterol (DUO-NEB) 0.5-2.5 mg/3 mL inhalation solution 3 mL    XR chest pa and lateral    Covid/Flu- Office Collect Normal            Patient Instructions     Patient Instructions   Patient Education     Acute bronchitis in adults   The Basics   Written by the doctors and editors at [facility]   What is bronchitis? -- This is an infection that causes a cough. It happens when the tubes that carry air into the lungs, called the \"bronchi,\" get infected (figure 1).  Usually, bronchitis happens after a person gets a cold or the flu. The viruses that cause the cold or flu infect the bronchi and irritate them. Antibiotics do not help bronchitis.  Bronchitis can also happen when a person gets an infection called \"whooping cough,\" but this is much less common. Whooping cough is caused by bacteria that can infect the bronchi. Most people get vaccines to prevent whooping cough, but the vaccine doesn't always work. Your doctor will be able to tell if you have whooping cough by doing an exam and listening to your cough.  This article is about \"acute\" bronchitis. This is different from \"chronic\" bronchitis, which is a lung disease that most often affects people who smoke.  What are the symptoms of bronchitis? -- The most common symptoms are:   A cough that can last up to a few weeks   Coughing up mucus that is clear, yellow, or green - Green mucus does not always mean that you have a bacterial infection.  You might also have other cold or flu symptoms, like a stuffy nose, sore throat, or headache. People with bronchitis do not " usually get a fever.  Will I need tests? -- Most people with bronchitis do not need a test. But if your doctor or nurse is not sure what is causing your cough, they might do tests. For example, they might order a chest X-ray. Or if they think that you might have COVID-19, they will test you for the virus that causes the infection.  How is bronchitis treated? -- Bronchitis almost always goes away on its own. But the cough can take up to 3 weeks to get better, and sometimes even longer.  Doctors do not usually treat bronchitis with antibiotic medicines. That's because bronchitis is usually caused by a virus, and antibiotics kill bacteria, not viruses. Also, antibiotics can actually cause other problems.  To feel better, you can treat your cold and flu symptoms. You can:   Get lots of rest, and drink plenty of liquids.   Drink hot tea.   Suck on cough drops or hard candy.   Take over-the-counter cough and cold medicines.   Breath in warm, moist air, such as in the shower, over a kettle, or from a humidifier.   Take a pain-relieving medicine if you have cold or flu symptoms like headache, muscle aches, or joint pain.  Avoid smoking or being around others who smoke. This can make your cough worse.  How can I keep from getting bronchitis again? -- You can reduce your chance of getting bronchitis again by keeping the germs that cause bronchitis out of your body. One of the best ways to do this is to wash your hands often with soap and water. If there is no sink nearby, you can use a hand gel with alcohol in it to clean your hands.  How can I keep from spreading germs? -- In addition to washing your hands often, cover your mouth with your elbow when you sneeze or cough. Using your elbow keeps you from getting germs on your hands. If you use a tissue, throw the tissue away and wash your hands.  When should I call the doctor? -- Call for advice if you have:   A fever higher than 100.4°F (38°C), or chills   Chest pain when you  "cough, trouble breathing, or coughing up blood   A barking cough that makes it hard to talk   Cough and weight loss that you cannot explain   Symptoms that are not getting better after 3 weeks  All topics are updated as new evidence becomes available and our peer review process is complete.  This topic retrieved from [employer] on: May 16, 2024.  Topic 24543 Version 17.0  Release: 32.4.3 - C32.135  © 2024 UpToDate, Inc. and/or its affiliates. All rights reserved.  figure 1: Normal lungs     The lungs sit in the chest, inside the ribcage. They are covered with a thin membrane called the \"pleura.\" The windpipe, or trachea, branches into 2 smaller airways called the left and right \"bronchi.\" The space between the lungs is called the \"mediastinum.\" Lymph nodes are located within and around the lungs and mediastinum.  Graphic 78817 Version 14.0  Consumer Information Use and Disclaimer   Disclaimer: This generalized information is a limited summary of diagnosis, treatment, and/or medication information. It is not meant to be comprehensive and should be used as a tool to help the user understand and/or assess potential diagnostic and treatment options. It does NOT include all information about conditions, treatments, medications, side effects, or risks that may apply to a specific patient. It is not intended to be medical advice or a substitute for the medical advice, diagnosis, or treatment of a health care provider based on the health care provider's examination and assessment of a patient's specific and unique circumstances. Patients must speak with a health care provider for complete information about their health, medical questions, and treatment options, including any risks or benefits regarding use of medications. This information does not endorse any treatments or medications as safe, effective, or approved for treating a specific patient. UpToDate, Inc. and its affiliates disclaim any warranty or liability relating " to this information or the use thereof.The use of this information is governed by the Terms of Use, available at https://www.wolterskluwer.com/en/know/clinical-effectiveness-terms. 2024© UpToDate, Inc. and its affiliates and/or licensors. All rights reserved.  Copyright   © 2024 UpToDate, Inc. and/or its affiliates. All rights reserved.        Follow up with PCP in 3-5 days.  Proceed to  ER if symptoms worsen.    Chief Complaint     Chief Complaint   Patient presents with    Cough     Cough started on Friday, discolored dark green mucus, chills. OTC - Dayquil and Tylenol         History of Present Illness       The patient is a 69-year-old female presenting today for a cough x 4 days.  Reports that she has been coughing up a green mucus.  Also has chills and joint pain.  Has been taking DayQuil and Tylenol over-the-counter. Reports shortness of breath. Also has CP with cough. Denies ear pain, congestion or sore throat. Admits to rhinorrhea. Reports that she works at a . Hx of asthma.       Review of Systems   Review of Systems   Constitutional:  Positive for chills. Negative for activity change, appetite change, fatigue and fever.   HENT:  Positive for rhinorrhea. Negative for congestion, ear pain, sinus pressure, sinus pain and sore throat.    Eyes:  Negative for pain and visual disturbance.   Respiratory:  Positive for cough. Negative for chest tightness and shortness of breath.    Cardiovascular:  Negative for chest pain and palpitations.   Gastrointestinal:  Negative for abdominal pain, diarrhea, nausea and vomiting.   Genitourinary:  Negative for dysuria and hematuria.   Musculoskeletal:  Negative for arthralgias, back pain and myalgias.   Skin:  Negative for color change, pallor and rash.   Neurological:  Negative for seizures, syncope and headaches.   All other systems reviewed and are negative.        Current Medications       Current Outpatient Medications:     albuterol (2.5 mg/3 mL) 0.083 %  nebulizer solution, Take 3 mL (2.5 mg total) by nebulization every 6 (six) hours as needed for wheezing or shortness of breath, Disp: 75 mL, Rfl: 0    albuterol (2.5 mg/3 mL) 0.083 % nebulizer solution, Take 3 mL (2.5 mg total) by nebulization every 6 (six) hours as needed for wheezing or shortness of breath, Disp: 3 mL, Rfl: 0    albuterol (ProAir HFA) 90 mcg/act inhaler, Inhale 2 puffs every 6 (six) hours as needed for wheezing, Disp: 8.5 g, Rfl: 6    azithromycin (ZITHROMAX) 250 mg tablet, Take 2 tablets today then 1 tablet daily x 4 days, Disp: 6 tablet, Rfl: 0    benzonatate (TESSALON PERLES) 100 mg capsule, Take 1 capsule (100 mg total) by mouth 3 (three) times a day as needed for cough, Disp: 30 capsule, Rfl: 0    Calcium Carb-Cholecalciferol 600-12.5 MG-MCG CAPS, Take by mouth daily in the early morning, Disp: , Rfl:     Denta 5000 Plus 1.1 % CREA, USE ONCE DAILY AT NIGHT, Disp: , Rfl:     DULoxetine (CYMBALTA) 60 mg delayed release capsule, TAKE 1 CAPSULE BY MOUTH EVERY DAY, Disp: 90 capsule, Rfl: 1    esomeprazole (NexIUM) 40 MG capsule, TAKE 1 CAPSULE BY MOUTH EVERY DAY BEFORE BREAKFAST, Disp: 90 capsule, Rfl: 1    Fluticasone-Salmeterol (Wixela Inhub) 100-50 mcg/dose inhaler, Inhale 1 puff 2 (two) times a day Rinse mouth after use., Disp: 60 blister, Rfl: 3    gabapentin (NEURONTIN) 300 mg capsule, Take 1 capsule (300 mg total) by mouth 3 (three) times a day, Disp: 270 capsule, Rfl: 3    levothyroxine 50 mcg tablet, TAKE 1 TABLET BY MOUTH EVERY DAY, Disp: 90 tablet, Rfl: 1    midodrine (PROAMATINE) 5 mg tablet, Take 5 mg by mouth daily, Disp: , Rfl:     QUEtiapine (SEROquel) 100 mg tablet, TAKE 1 TABLET BY MOUTH DAILY AT BEDTIME, Disp: 90 tablet, Rfl: 1    albuterol (PROVENTIL HFA,VENTOLIN HFA) 90 mcg/act inhaler, Inhale 2 puffs every 6 (six) hours as needed for wheezing or shortness of breath (Patient not taking: Reported on 2/10/2025), Disp: 18 g, Rfl: 0    benzonatate (TESSALON PERLES) 100 mg capsule,  Take 1 capsule (100 mg total) by mouth 2 (two) times a day as needed for cough (Patient not taking: Reported on 1/25/2025), Disp: 21 capsule, Rfl: 0    cyclobenzaprine (FLEXERIL) 10 mg tablet, Take 1 tablet (10 mg total) by mouth 2 (two) times a day as needed (neck pain) for up to 7 days, Disp: 14 tablet, Rfl: 0    docusate sodium (COLACE) 100 mg capsule, Take 1 capsule (100 mg total) by mouth 2 (two) times a day (Patient not taking: Reported on 1/25/2025), Disp: 60 capsule, Rfl: 0    famotidine (PEPCID) 20 mg tablet, Take 1 tablet (20 mg total) by mouth daily as needed for heartburn (Patient not taking: Reported on 11/11/2024), Disp: 90 tablet, Rfl: 3    methylPREDNISolone 4 MG tablet therapy pack, Use as directed on package (Patient not taking: Reported on 11/11/2024), Disp: 21 tablet, Rfl: 0    predniSONE 10 mg tablet, 5 po daily x2 days, then 4 po daily x2 days, then 3 po daily x2 days, then 2 po daily x2 days,then 1 po daily x2days (Patient not taking: Reported on 2/6/2025), Disp: 30 tablet, Rfl: 0  No current facility-administered medications for this visit.    Current Allergies     Allergies as of 02/10/2025 - Reviewed 02/10/2025   Allergen Reaction Noted    Meperidine Lightheadedness and Other (See Comments) 01/11/2006    Sulfa antibiotics Hives 01/11/2006            The following portions of the patient's history were reviewed and updated as appropriate: allergies, current medications, past family history, past medical history, past social history, past surgical history and problem list.     Past Medical History:   Diagnosis Date    Abdominal adhesions     Alcoholism (HCC) 1996    in remission    Anesthesia complication     difficult to wake up    Anxiety     Arthritis     Asthma     with exertion    Cancer (HCC)     melanoma    Colon polyp     Crohn's disease (HCC)     Depression     Depression     Disease of thyroid gland     Fibromyalgia     Fibromyalgia, primary     Fractures 2006    GERD  (gastroesophageal reflux disease)     History of cholecystectomy 09/07/2019    Hyperlipidemia     Infected tooth 01/2024    Tooth #14-was treated with antibiotics-endodontal appt 2/1 may need root canal    Irregular heart beat     can be tachy; also has orthoststic hypotension    Irritable bowel syndrome 1990    Lazy eye     resolved: 3/27/17    Medical clearance for psychiatric admission 07/13/2021    Melanoma ([facility]) 2010    Mild asthma 11/04/2020    Osteoarthritis 07/2018    Osteopenia     with joint pain-elevated DEV    Polymyalgia (HCC) 2/10/2022    Psychiatric disorder     Shortness of breath     assoc with tachycardia    Stomach disorder 1995    Tinnitus     Wears glasses     for reading       Past Surgical History:   Procedure Laterality Date    ABDOMINAL SURGERY      lysis of adhesions x 2    APPENDECTOMY      CERVICAL FUSION      May 5,2021 and Jan. 01,2020    CHOLECYSTECTOMY      open    COLONOSCOPY      DILATION AND CURETTAGE OF UTERUS      FOOT SURGERY  05/2017    NECK SURGERY  01/2019 5/2021    NEUROMA EXCISION Right 05/26/2017    Procedure: EXCISION MASS / FIBROMA FOOT;  Surgeon: Jorden Crespo DPM;  Location: [facility] MAIN OR;  Service:     PARS PLANA VITRECTOMY W/ REPAIR OF MACULAR HOLE  12/23/2020    NV ARTHRP KNE CONDYLE&PLATU MEDIAL&LAT COMPARTMENTS Right 2/6/2024    Procedure: ARTHROPLASTY KNEE TOTAL W ROBOT - RIGHT - PRESS FIT - OVERNIGHT;  Surgeon: Jairon Kim DO;  Location: [facility];  Service: Orthopedics    NV XCAPSL CTRC RMVL INSJ IO LENS PROSTH W/O ECP Left 12/06/2021    Procedure: EXTRACTION EXTRACAPSULAR CATARACT PHACO INTRAOCULAR LENS (IOL);  Surgeon: Mandeep Chavez MD;  Location: [facility] MAIN OR;  Service: Ophthalmology    RIGHT OOPHORECTOMY      UPPER GASTROINTESTINAL ENDOSCOPY  5/2019    WISDOM TOOTH EXTRACTION      x4       Family History   Problem Relation Age of Onset    Heart disease Mother     Stroke Mother 62    COPD Mother     Arthritis Mother     Asthma Mother     Hypertension  "Father     Dislocations Sister     Multiple sclerosis Sister     Neurological problems Sister     Scoliosis Sister     Depression Sister     Mental illness Sister     No Known Problems Maternal Grandmother     No Known Problems Maternal Grandfather     No Known Problems Paternal Grandmother     No Known Problems Paternal Grandfather     Kidney disease Brother         kidney transplant    No Known Problems Maternal Aunt     No Known Problems Maternal Uncle     No Known Problems Paternal Aunt     No Known Problems Paternal Uncle     ADD / ADHD Neg Hx     Anesthesia problems Neg Hx     Cancer Neg Hx     Clotting disorder Neg Hx     Collagen disease Neg Hx     Diabetes Neg Hx     Dislocations Neg Hx     Learning disabilities Neg Hx     Neurological problems Neg Hx     Osteoporosis Neg Hx     Rheumatologic disease Neg Hx     Scoliosis Neg Hx     Vascular Disease Neg Hx          Medications have been verified.        Objective   /80   Pulse 95   Temp 99.1 °F (37.3 °C)   Resp 16   Ht 5' 5\" (1.651 m)   Wt 76.7 kg (169 lb)   LMP  (LMP Unknown)   SpO2 95%   BMI 28.12 kg/m²        Physical Exam     Physical Exam  Vitals reviewed.   Constitutional:       General: She is not in acute distress.     Appearance: Normal appearance. She is well-developed and normal weight. She is not ill-appearing, toxic-appearing or diaphoretic.   HENT:      Head: Normocephalic and atraumatic.      Right Ear: Tympanic membrane, ear canal and external ear normal. No drainage, swelling or tenderness. No middle ear effusion. There is no impacted cerumen. Tympanic membrane is not erythematous.      Left Ear: Tympanic membrane, ear canal and external ear normal. No drainage, swelling or tenderness.  No middle ear effusion. There is no impacted cerumen. Tympanic membrane is not erythematous.      Nose: Nose normal. No congestion or rhinorrhea.      Mouth/Throat:      Mouth: Mucous membranes are moist. No oral lesions.      Pharynx: " Oropharynx is clear. Uvula midline. No pharyngeal swelling, oropharyngeal exudate, posterior oropharyngeal erythema or uvula swelling.      Tonsils: No tonsillar exudate or tonsillar abscesses. 0 on the right. 0 on the left.   Eyes:      Extraocular Movements:      Right eye: Normal extraocular motion.      Left eye: Normal extraocular motion.      Conjunctiva/sclera: Conjunctivae normal.      Pupils: Pupils are equal, round, and reactive to light.   Cardiovascular:      Rate and Rhythm: Normal rate and regular rhythm.      Heart sounds: Normal heart sounds. No murmur heard.     No friction rub. No gallop.   Pulmonary:      Effort: Pulmonary effort is normal. No respiratory distress.      Breath sounds: No stridor. Wheezing and rhonchi present. No rales.      Comments: Diffuse wheezing and rhonchi.  Chest:      Chest wall: No tenderness.   Abdominal:      General: Abdomen is flat. Bowel sounds are normal. There is no distension.      Palpations: Abdomen is soft.      Tenderness: There is no abdominal tenderness. There is no guarding.   Musculoskeletal:         General: Normal range of motion.   Skin:     General: Skin is warm and dry.      Capillary Refill: Capillary refill takes less than 2 seconds.   Neurological:      Mental Status: She is alert.

## 2025-02-11 ENCOUNTER — OFFICE VISIT (OUTPATIENT)
Dept: FAMILY MEDICINE CLINIC | Facility: CLINIC | Age: 70
End: 2025-02-11
Payer: COMMERCIAL

## 2025-02-11 ENCOUNTER — TELEPHONE (OUTPATIENT)
Age: 70
End: 2025-02-11

## 2025-02-11 ENCOUNTER — DOCUMENTATION (OUTPATIENT)
Dept: ADMINISTRATIVE | Facility: OTHER | Age: 70
End: 2025-02-11

## 2025-02-11 VITALS
HEIGHT: 65 IN | DIASTOLIC BLOOD PRESSURE: 70 MMHG | TEMPERATURE: 97.5 F | SYSTOLIC BLOOD PRESSURE: 110 MMHG | HEART RATE: 68 BPM | BODY MASS INDEX: 27.99 KG/M2 | WEIGHT: 168 LBS

## 2025-02-11 DIAGNOSIS — I15.9 SECONDARY HYPERTENSION: ICD-10-CM

## 2025-02-11 DIAGNOSIS — E03.9 ADULT HYPOTHYROIDISM: ICD-10-CM

## 2025-02-11 DIAGNOSIS — E78.2 MIXED HYPERLIPIDEMIA: Primary | ICD-10-CM

## 2025-02-11 DIAGNOSIS — Z12.39 SCREENING BREAST EXAMINATION: ICD-10-CM

## 2025-02-11 DIAGNOSIS — J20.9 ACUTE BRONCHITIS, UNSPECIFIED ORGANISM: Primary | ICD-10-CM

## 2025-02-11 PROCEDURE — 99213 OFFICE O/P EST LOW 20 MIN: CPT | Performed by: NURSE PRACTITIONER

## 2025-02-11 NOTE — PROGRESS NOTES
Blood pressure elevated  Appointment department: [facility]  Appointment provider: Tita Martínez PA-C  Blood pressure   02/10/25 1615 140/80   02/10/25 1612 140/82

## 2025-02-11 NOTE — PROGRESS NOTES
Name: Ina Tolbert      : 1955      MRN: [id number]  Encounter Provider: SHRAVAN Reeder  Encounter Date: 2025   Encounter department: [facility]  :  Assessment & Plan  Acute bronchitis, unspecified organism  Will continue with z-pack. Will start nebulizer. Take medication with food.  It is important that you take the entire course of antibiotics prescribed.  May also take a probiotic of your choice to maintain healthy GI lon.    Can take some probiotic and yogurt with the medication.  Supportive care discussed and advised.  Advised to RTO for any worsening and no improvement.   Follow up for no improvement and worsening of conditions.  Patient advised and educated when to see immediate medical care.                History of Present Illness   Patient stated that started with cough last week and stated that was seen in urgent care yesterday and tested negative for covid and flu. Chest xray was negative.   Started on z-pack which she started today    Cough  Pertinent negatives include no chills, ear pain, fever, headaches, postnasal drip, rhinorrhea, sore throat, shortness of breath or wheezing.     Review of Systems   Constitutional:  Negative for chills, diaphoresis, fatigue, fever and unexpected weight change.   HENT:  Negative for congestion, dental problem, drooling, ear discharge, ear pain, facial swelling, hearing loss, mouth sores, nosebleeds, postnasal drip, rhinorrhea, sinus pressure, sinus pain, sneezing, sore throat, tinnitus, trouble swallowing and voice change.    Respiratory:  Positive for cough. Negative for chest tightness, shortness of breath and wheezing.    Cardiovascular: Negative.    Gastrointestinal:  Negative for abdominal pain, constipation, diarrhea, nausea and vomiting.   Musculoskeletal: Negative.    Skin: Negative.    Neurological:  Negative for dizziness, light-headedness and headaches.       Objective   /70   Pulse 68   Temp 97.5 °F (36.4 °C)    "Ht 5' 5\" (1.651 m)   Wt 76.2 kg (168 lb)   LMP  (LMP Unknown)   BMI 27.96 kg/m²      Physical Exam  Vitals reviewed.   Constitutional:       Appearance: Normal appearance. She is well-developed.   HENT:      Head: Normocephalic.      Right Ear: Tympanic membrane, ear canal and external ear normal.      Left Ear: Tympanic membrane, ear canal and external ear normal.      Nose: Nose normal.      Right Sinus: No maxillary sinus tenderness or frontal sinus tenderness.      Left Sinus: No maxillary sinus tenderness or frontal sinus tenderness.      Mouth/Throat:      Mouth: No oral lesions.      Pharynx: No oropharyngeal exudate or posterior oropharyngeal erythema.   Cardiovascular:      Rate and Rhythm: Normal rate and regular rhythm.      Heart sounds: Normal heart sounds.   Pulmonary:      Effort: Pulmonary effort is normal.      Breath sounds: Wheezing present.   Musculoskeletal:      Cervical back: Neck supple.   Lymphadenopathy:      Cervical:      Right cervical: No superficial or posterior cervical adenopathy.     Left cervical: No superficial or posterior cervical adenopathy.   Skin:     General: Skin is warm and dry.   Neurological:      Mental Status: She is alert and oriented to person, place, and time.   Psychiatric:         Behavior: Behavior normal.         Thought Content: Thought content normal.         Judgment: Judgment normal.         "

## 2025-02-11 NOTE — PROGRESS NOTES
02/11/25 1:25 PM    Patient was called after the Urgent Care visit Patient has an upcoming appointment with their primary care provider on 2/11/25 to follow on an elevated Blood pressure.  And not feeling well    Thank you.  Kirsten Dinero MA  [facility]

## 2025-02-11 NOTE — TELEPHONE ENCOUNTER
Patient scheduled for physical for 3/31 and would like a script for a mammogram. Please place order for this and any labs needed prior to appointment. She will get from her MyChart.

## 2025-02-13 ENCOUNTER — OFFICE VISIT (OUTPATIENT)
Dept: PHYSICAL THERAPY | Facility: CLINIC | Age: 70
End: 2025-02-13
Payer: COMMERCIAL

## 2025-02-13 DIAGNOSIS — M48.02 CERVICAL SPINAL STENOSIS: Primary | ICD-10-CM

## 2025-02-13 PROCEDURE — 97110 THERAPEUTIC EXERCISES: CPT | Performed by: PHYSICAL THERAPIST

## 2025-02-13 PROCEDURE — 97530 THERAPEUTIC ACTIVITIES: CPT | Performed by: PHYSICAL THERAPIST

## 2025-02-13 NOTE — PROGRESS NOTES
Daily Note     Today's date: 2025  Patient name: Ina Tolbert  : 1955  MRN: [id number]  Referring provider: Ross Sahu  Dx:   Encounter Diagnosis     ICD-10-CM    1. Cervical spinal stenosis  M48.02                  Subjective: Patient reports slight discomfort in neck for a few minutes after performing her HEP. Reports no symptoms in left arm.     Objective: See treatment diary below    Assessment: Tolerated treatment well. Improved left turning ROM following repeated motions today. Overall patient is doing well and was educated to continue HEP to assess if further response is attained.     Plan: Continue per plan of care.        Insurance:  AMA/CMS Eval/ Re-eval POC expires Auth #/ Referral # Total    Start date  Expiration date Extension  Visit limitation?  PT only or  PT+OT? Co-Insurance   CMS 2.3.25 3.31.25      BOMN                    AUTH #:  Date 23 2.6 2.10 2.13           Authed: Used 1 2 3 4            Remaining  11 10 9 8             Precautions: orthostatic hypotension, TIA, migraines, HTN, OP, hx of cervical surgery  Patient provided verbal consent to treatment plan and recommended interventions.    Manuals 2/3/25 2/6/25 2/10 2/13     visit 1 2 3 4                                Neuro Re-Ed                                    Ther Ex         Cerv. retraction 5x10 2x10 4x10, 2x10 with self OP 4x10, 2x10 with self OP     Cerv extn rep 2x10 2x10 3x10 with retraction 1st 1 x10     Snags to left  2x10                                                    Ther Activity         Pt ed FB FB FB FB

## 2025-02-20 ENCOUNTER — APPOINTMENT (OUTPATIENT)
Dept: PHYSICAL THERAPY | Facility: CLINIC | Age: 70
End: 2025-02-20
Payer: COMMERCIAL

## 2025-02-24 ENCOUNTER — OFFICE VISIT (OUTPATIENT)
Dept: PSYCHIATRY | Facility: CLINIC | Age: 70
End: 2025-02-24
Payer: COMMERCIAL

## 2025-02-24 VITALS
HEART RATE: 100 BPM | BODY MASS INDEX: 27.92 KG/M2 | HEIGHT: 65 IN | SYSTOLIC BLOOD PRESSURE: 131 MMHG | WEIGHT: 167.6 LBS | DIASTOLIC BLOOD PRESSURE: 88 MMHG

## 2025-02-24 DIAGNOSIS — F51.05 INSOMNIA RELATED TO ANOTHER MENTAL DISORDER: ICD-10-CM

## 2025-02-24 DIAGNOSIS — F10.21 ALCOHOL DEPENDENCE IN REMISSION (HCC): ICD-10-CM

## 2025-02-24 DIAGNOSIS — F63.0 GAMBLING DISORDER, MODERATE: ICD-10-CM

## 2025-02-24 DIAGNOSIS — F41.1 GENERALIZED ANXIETY DISORDER: ICD-10-CM

## 2025-02-24 DIAGNOSIS — F33.2 SEVERE EPISODE OF RECURRENT MAJOR DEPRESSIVE DISORDER, WITHOUT PSYCHOTIC FEATURES (HCC): Primary | ICD-10-CM

## 2025-02-24 DIAGNOSIS — F43.10 PTSD (POST-TRAUMATIC STRESS DISORDER): Chronic | ICD-10-CM

## 2025-02-24 PROCEDURE — 90833 PSYTX W PT W E/M 30 MIN: CPT | Performed by: NURSE PRACTITIONER

## 2025-02-24 PROCEDURE — 99214 OFFICE O/P EST MOD 30 MIN: CPT | Performed by: NURSE PRACTITIONER

## 2025-02-24 RX ORDER — NALTREXONE HYDROCHLORIDE 50 MG/1
TABLET, FILM COATED ORAL
Qty: 30 TABLET | Refills: 3 | Status: SHIPPED | OUTPATIENT
Start: 2025-02-24

## 2025-02-25 NOTE — PATIENT INSTRUCTIONS
Continue medications  Call with problems or concerns  Naltrexone 50 mg tablet, take half a tablet for 3 days and then increase to a full tablet daily

## 2025-02-25 NOTE — ASSESSMENT & PLAN NOTE
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  Denies suicidal ideation.  Getting along better with her .  Reports appetite and sleep patterns are fair to good.  Seroquel is taken at night to sleep.  PHQ-9 score of 1 indicates no depression or suicidal ideation.  Takes medication as prescribed and family are supportive

## 2025-02-25 NOTE — BH TREATMENT PLAN
TREATMENT PLAN (Medication Management Only)         [facility] - PSYCHIATRIC ASSOCIATES            [facility]     Name and Date of Birth:  Ina Tolbert 69 y.o. 1955     Date of Treatment Plan: October 18, 2022, March 28, 2023 October 3, 2023, February 27, 2024 August 15, 2024, February 24, 2025     Diagnosis/Diagnoses:    1. Major depressive disorder, recurrent, in partial remission (HCC)    2. Generalized anxiety disorder    3. Insomnia related to another mental disorder    4. Marital conflict          Strengths/Personal Resources for Self-Care: financial security, ability to express needs, motivation for treatment     Area/Areas of need (in own words): depression, gambling addiction, emotional insecurity  1.         Long Term Goal: continue to improve control of depression, anxiety, ultimate goal no hospitalization  Target date: 8/24/2025  Person/Persons responsible for completion of goal: sarah Buckley     2.         Short Term Objective (s) - How will we reach this goal?:   A. Provider new recommended medication/dosage changes and/or continue medication(s):  Cymbalta, Seroquel  B. take medications as prescribed, attend scheduled appointments  C. See Aury Ramos LCSW ([facility]) for psychotherapy   Target date: 8/24/2025   person/Persons Responsible for Completion of Goal: sarah Buckley      Progress Towards Goals: progressing     Treatment Modality: medication management every 12 weeks     Review due 180 days from date of this plan: 8/24/2025   expected length of service: ongoing treatment  My Physician/PA/NP and I have developed this plan together and I agree to work on the goals and objectives. I understand the treatment goals that were developed for my treatment.

## 2025-02-25 NOTE — ASSESSMENT & PLAN NOTE
Continues occasional gambling which she feels bad about, she reports winning $8000 recently and it is hard to stop because of the mindset of winning again and the dopamine rush.  Discussed options with her and naltrexone 50 mg tablet take one half for 3 days then increase to full tablet she agreed we discussed side effects and she will call if problems occur.    Orders:    naltrexone (REVIA) 50 mg tablet; Take one half tablet (25 mg) for three days, then increase to a full tablet (50 mg) po daily.

## 2025-02-25 NOTE — ASSESSMENT & PLAN NOTE
"Reports PTSD symptoms have resolved and she is \"moving on\" denies flashbacks or nightmares or increased anxiety.         "

## 2025-02-25 NOTE — PSYCH
"MEDICATION MANAGEMENT NOTE        [facility] - PS[facility]CHIATRIC [facility]IATES      Name and Date of Birth:  Ina Tolbert 69 y.o. 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  Denies suicidal ideation.  Getting along better with her .  Reports appetite and sleep patterns are fair to good.  Seroquel is taken at night to sleep.  PHQ-9 score of 1 indicates no depression or suicidal ideation.  Takes medication as prescribed and family are supportive         PTSD (post-traumatic stress disorder)  Reports PTSD symptoms have resolved and she is \"moving on\" denies flashbacks or nightmares or increased anxiety.         Insomnia related to another mental disorder  Seroquel 100 mg at night is effective to help her sleep aims negative.         Generalized anxiety disorder         Gambling disorder, moderate  Continues occasional gambling which she feels bad about, she reports winning $8000 recently and it is hard to stop because of the mindset of winning again and the dopamine rush.  Discussed options with her and naltrexone 50 mg tablet take one half for 3 days then increase to full tablet she agreed we discussed side effects and she will call if problems occur.    Orders:    naltrexone (REVIA) 50 mg tablet; Take one half tablet (25 mg) for three days, then increase to a full tablet (50 mg) po daily.    Alcohol dependence in remission (HCC)  Denies alcohol use.                   Risks/benefits/alternativies to treatment discussed, including potential adverse medication side " effects, to which Ina voiced understanding and consented fully to treatment.  Also, patient is amenable to calling/contacting the outpatient office including this writer if any acute adverse effects of their medication regimen arise in addition to any comments or concerns pertaining to their psychiatric management.      Medications Risks/Benefits      Risks, Benefits And Possible Side Effects Of Medications:    Denies SE         Subjective      Ina Tolbert is a 69 y.o. female, visited for Anxiety, Depression, and PTSD, who was personally seen and evaluated today at the [facility] outpatient clinic for follow-up and medication management. Completes psychiatric assessment without difficulty.     At previous outpatient psychiatric appointment with this writer, slowly improving with mood and relationship with .   She denies any current adverse medication side effects. Overall, Ina  is improving with treatment      Review Of Systems:  Pertinent items are noted in HPI; all others are negative; no recent changes in medications or health status reported.    PHQ-2/9 Depression Screening    Little interest or pleasure in doing things: 0 - not at all  Feeling down, depressed, or hopeless: 0 - not at all  Trouble falling or staying asleep, or sleeping too much: 0 - not at all  Feeling tired or having little energy: 0 - not at all  Poor appetite or overeatin - several days  Feeling bad about yourself - or that you are a failure or have let yourself or your family down: 0 - not at all  Trouble concentrating on things, such as reading the newspaper or watching television: 0 - not at all  Moving or speaking so slowly that other people could have noticed. Or the opposite - being so fidgety or restless that you have been moving around a lot more than usual: 0 - not at all  Thoughts that you would be better off dead, or of hurting yourself in some way: 0 - not at all  PHQ-9 Score: 1  PHQ-9  Interpretation: No or Minimal depression         ELLY-7 Flowsheet Screening      Flowsheet Row Most Recent Value   Over the last two weeks, how often have you been bothered by the following problems?     Feeling nervous, anxious, or on edge 0   Not being able to stop or control worrying 1   Worrying too much about different things 1   Trouble relaxing  0   Being so restless that it's hard to sit still 0   Becoming easily annoyed or irritable  0   Feeling afraid as if something awful might happen 0   How difficult have these problems made it for you to do your work, take care of things at home, or get along with other people?  Not difficult at all   ELLY Score  2            Historical Information    Past Psychiatric History Update:   - No inpatient psychiatric admission since last encounter  - No SA or SIB since last encounter  - No incidence of violent behavior since last encounter    Past Trauma History Update:   - No new onset of abuse or traumatic events since last encounter     Past Medical History:    Past Medical History:   Diagnosis Date    Abdominal adhesions     Alcoholism (HCC) 1996    in remission    Anesthesia complication     difficult to wake up    Anxiety     Arthritis     Asthma     with exertion    Cancer (HCC)     melanoma    Colon polyp     Crohn's disease (HCC)     Depression     Depression     Disease of thyroid gland     Fibromyalgia     Fibromyalgia, primary     Fractures 2006    GERD (gastroesophageal reflux disease)     History of cholecystectomy 09/07/2019    Hyperlipidemia     Infected tooth 01/2024    Tooth #14-was treated with antibiotics-endodontal appt 2/1 may need root canal    Irregular heart beat     can be tachy; also has orthoststic hypotension    Irritable bowel syndrome 1990    Lazy eye     resolved: 3/27/17    Medical clearance for psychiatric admission 07/13/2021    Melanoma (HCC) 2010    Mild asthma 11/04/2020    Osteoarthritis 07/2018    Osteopenia     with joint pain-elevated  DEV    Polymyalgia (HCC) 2/10/2022    Psychiatric disorder     Shortness of breath     assoc with tachycardia    Stomach disorder 1995    Tinnitus     Wears glasses     for reading        Past Surgical History:   Procedure Laterality Date    ABDOMINAL SURGERY      lysis of adhesions x 2    APPENDECTOMY      CERVICAL FUSION      May 5,2021 and Jan. 01,2020    CHOLECYSTECTOMY      open    COLONOSCOPY      DILATION AND CURETTAGE OF UTERUS      FOOT SURGERY  05/2017    NECK SURGERY  01/2019 5/2021    NEUROMA EXCISION Right 05/26/2017    Procedure: EXCISION MASS / FIBROMA FOOT;  Surgeon: Jorden Crespo DPM;  Location: [facility];  Service:     PARS PLANA VITRECTOMY W/ REPAIR OF MACULAR HOLE  12/23/2020    NE ARTHRP KNE CONDYLE&PLATU MEDIAL&LAT COMPARTMENTS Right 2/6/2024    Procedure: ARTHROPLASTY KNEE TOTAL W ROBOT - RIGHT - PRESS FIT - OVERNIGHT;  Surgeon: Jairon Kim DO;  Location: [facility];  Service: Orthopedics    NE XCAPSL CTRC RMVL INSJ IO LENS PROSTH W/O ECP Left 12/06/2021    Procedure: EXTRACTION EXTRACAPSULAR CATARACT PHACO INTRAOCULAR LENS (IOL);  Surgeon: Mandeep Chavez MD;  Location: [facility] MAIN OR;  Service: Ophthalmology    RIGHT OOPHORECTOMY      UPPER GASTROINTESTINAL ENDOSCOPY  5/2019    WISDOM TOOTH EXTRACTION      x4     Allergies   Allergen Reactions    Meperidine Lightheadedness and Other (See Comments)    Sulfa Antibiotics Hives       Substance Abuse History:    Social History     Substance and Sexual Activity   Alcohol Use Not Currently     Social History     Substance and Sexual Activity   Drug Use No       Social History:    Social History     Socioeconomic History    Marital status: /Civil Union     Spouse name: Not on file    Number of children: Not on file    Years of education: Not on file    Highest education level: Not on file   Occupational History    Not on file   Tobacco Use    Smoking status: Never     Passive exposure: Never    Smokeless tobacco: Never   Vaping Use     Vaping status: Never Used   Substance and Sexual Activity    Alcohol use: Not Currently    Drug use: No    Sexual activity: Not Currently     Partners: Male   Other Topics Concern    Not on file   Social History Narrative    Not on file     Social Drivers of Health     Financial Resource Strain: Not on file   Food Insecurity: No Food Insecurity (6/18/2024)    Nursing - Inadequate Food Risk Classification     Worried About Running Out of Food in the Last Year: Never true     Ran Out of Food in the Last Year: Never true     Ran Out of Food in the Last Year: Not on file   Transportation Needs: No Transportation Needs (6/18/2024)    PRAPARE - Transportation     Lack of Transportation (Medical): No     Lack of Transportation (Non-Medical): No   Physical Activity: Not on file   Stress: Not on file   Social Connections: Not on file   Intimate Partner Violence: Not on file   Housing Stability: Low Risk  (6/18/2024)    Housing Stability Vital Sign     Unable to Pay for Housing in the Last Year: No     Number of Times Moved in the Last Year: 0     Homeless in the Last Year: No       Family Psychiatric History:     Family History   Problem Relation Age of Onset    Heart disease Mother     Stroke Mother 62    COPD Mother     Arthritis Mother     Asthma Mother     Hypertension Father     Dislocations Sister     Multiple sclerosis Sister     Neurological problems Sister     Scoliosis Sister     Depression Sister     Mental illness Sister     No Known Problems Maternal Grandmother     No Known Problems Maternal Grandfather     No Known Problems Paternal Grandmother     No Known Problems Paternal Grandfather     Kidney disease Brother         kidney transplant    No Known Problems Maternal Aunt     No Known Problems Maternal Uncle     No Known Problems Paternal Aunt     No Known Problems Paternal Uncle     ADD / ADHD Neg Hx     Anesthesia problems Neg Hx     Cancer Neg Hx     Clotting disorder Neg Hx     Collagen disease Neg Hx      "Diabetes Neg Hx     Dislocations Neg Hx     Learning disabilities Neg Hx     Neurological problems Neg Hx     Osteoporosis Neg Hx     Rheumatologic disease Neg Hx     Scoliosis Neg Hx     Vascular Disease Neg Hx        History Review: The following portions of the patient's history were reviewed and updated as appropriate: allergies, current medications, past family history, past medical history, past social history, past surgical history and problem list.           Objective      Vital signs in last 24 hours:  Vitals:    02/24/25 1534   BP: 131/88   Pulse: 100   Weight: 76 kg (167 lb 9.6 oz)   Height: 5' 5\" (1.651 m)       Mental Status Evaluation:    Appearance age appropriate, casually dressed   Behavior cooperative, calm   Speech normal rate, normal volume, normal pitch   Mood anxious   Affect normal range and intensity, appropriate   Thought Processes organized, goal directed   Associations intact associations   Thought Content no overt delusions   Perceptual Disturbances: no auditory hallucinations, no visual hallucinations   Abnormal Thoughts  Risk Potential Suicidal ideation - None  Homicidal ideation - None  Potential for aggression - No   Orientation oriented to: person, place, time/date, and situation   Memory recent and remote memory grossly intact   Consciousness alert and awake   Attention Span Concentration Span attention span and concentration are age appropriate   Intellect not formally assessed   Insight intact   Judgement intact   Muscle Strength and  Gait normal muscle strength and normal muscle tone, normal gait and normal balance   Motor activity no abnormal movements   Language no difficulty naming common objects   Fund of Knowledge adequate knowledge of current events   Pain none   Pain Scale 0             Current Outpatient Medications   Medication Sig Dispense Refill    albuterol (2.5 mg/3 mL) 0.083 % nebulizer solution Take 3 mL (2.5 mg total) by nebulization every 6 (six) hours as needed " for wheezing or shortness of breath 75 mL 0    albuterol (2.5 mg/3 mL) 0.083 % nebulizer solution Take 3 mL (2.5 mg total) by nebulization every 6 (six) hours as needed for wheezing or shortness of breath 3 mL 0    albuterol (ProAir HFA) 90 mcg/act inhaler Inhale 2 puffs every 6 (six) hours as needed for wheezing 8.5 g 6    benzonatate (TESSALON PERLES) 100 mg capsule Take 1 capsule (100 mg total) by mouth 3 (three) times a day as needed for cough 30 capsule 0    Calcium Carb-Cholecalciferol 600-12.5 MG-MCG CAPS Take by mouth daily in the early morning      DULoxetine (CYMBALTA) 60 mg delayed release capsule TAKE 1 CAPSULE BY MOUTH EVERY DAY 90 capsule 1    esomeprazole (NexIUM) 40 MG capsule TAKE 1 CAPSULE BY MOUTH EVERY DAY BEFORE BREAKFAST 90 capsule 1    Fluticasone-Salmeterol (Wixela Inhub) 100-50 mcg/dose inhaler Inhale 1 puff 2 (two) times a day Rinse mouth after use. 60 blister 3    gabapentin (NEURONTIN) 300 mg capsule Take 1 capsule (300 mg total) by mouth 3 (three) times a day 270 capsule 3    levothyroxine 50 mcg tablet TAKE 1 TABLET BY MOUTH EVERY DAY 90 tablet 1    naltrexone (REVIA) 50 mg tablet Take one half tablet (25 mg) for three days, then increase to a full tablet (50 mg) po daily. 30 tablet 3    QUEtiapine (SEROquel) 100 mg tablet TAKE 1 TABLET BY MOUTH DAILY AT BEDTIME 90 tablet 1    albuterol (PROVENTIL HFA,VENTOLIN HFA) 90 mcg/act inhaler Inhale 2 puffs every 6 (six) hours as needed for wheezing or shortness of breath (Patient not taking: Reported on 2/25/2025) 18 g 0    benzonatate (TESSALON PERLES) 100 mg capsule Take 1 capsule (100 mg total) by mouth 2 (two) times a day as needed for cough (Patient not taking: Reported on 2/25/2025) 21 capsule 0    Denta 5000 Plus 1.1 % CREA USE ONCE DAILY AT NIGHT      docusate sodium (COLACE) 100 mg capsule Take 1 capsule (100 mg total) by mouth 2 (two) times a day (Patient not taking: Reported on 1/25/2025) 60 capsule 0    famotidine (PEPCID) 20 mg  tablet Take 1 tablet (20 mg total) by mouth daily as needed for heartburn (Patient not taking: Reported on 11/11/2024) 90 tablet 3    midodrine (PROAMATINE) 5 mg tablet Take 5 mg by mouth daily       No current facility-administered medications for this visit.         Psychotherapy Provided:     Individual psychotherapy provided: Yes   Supportive therapy  Medication evaluation and education  Mood assessment  Surveys reviewed and confirmed  Normalized and validated her feelings  Updated treatment plan  Coping skills regarding gambling    Visit Time    Visit Start Time: 3:30   Visit Stop Time: 4:00  Total Visit Duration:  30 minutes    Callie Guidry, PhD 02/25/25    This note was completed in part utilizing Dragon dictation Software. Grammatical, translation, syntax errors, random word insertions, spelling mistakes, and incomplete sentences may be an occasional consequence of this system secondary to software limitations with voice recognition, ambient noise, and hardware issues. If you have any questions or concerns about the content, text, or information contained within the body of this dictation, please contact the provider for clarification.

## 2025-03-28 ENCOUNTER — TELEPHONE (OUTPATIENT)
Dept: PULMONOLOGY | Facility: MEDICAL CENTER | Age: 70
End: 2025-03-28

## 2025-03-28 NOTE — TELEPHONE ENCOUNTER
LM for patient to call office back to schedule follow up appointment with Dr. Champion. Patient is due in May for a 6m f/u.

## 2025-03-31 ENCOUNTER — OFFICE VISIT (OUTPATIENT)
Dept: FAMILY MEDICINE CLINIC | Facility: CLINIC | Age: 70
End: 2025-03-31
Payer: COMMERCIAL

## 2025-03-31 VITALS
DIASTOLIC BLOOD PRESSURE: 76 MMHG | HEART RATE: 88 BPM | TEMPERATURE: 97.5 F | HEIGHT: 66 IN | WEIGHT: 167.2 LBS | BODY MASS INDEX: 26.87 KG/M2 | SYSTOLIC BLOOD PRESSURE: 132 MMHG

## 2025-03-31 DIAGNOSIS — M54.12 CERVICAL MYELOPATHY WITH CERVICAL RADICULOPATHY  (HCC): ICD-10-CM

## 2025-03-31 DIAGNOSIS — Z12.39 SCREENING BREAST EXAMINATION: ICD-10-CM

## 2025-03-31 DIAGNOSIS — J84.9 ILD (INTERSTITIAL LUNG DISEASE) (HCC): ICD-10-CM

## 2025-03-31 DIAGNOSIS — G95.9 CERVICAL MYELOPATHY WITH CERVICAL RADICULOPATHY  (HCC): ICD-10-CM

## 2025-03-31 DIAGNOSIS — K50.80 CROHN'S DISEASE OF BOTH SMALL AND LARGE INTESTINE WITHOUT COMPLICATION (HCC): ICD-10-CM

## 2025-03-31 DIAGNOSIS — Z00.00 ANNUAL PHYSICAL EXAM: Primary | ICD-10-CM

## 2025-03-31 DIAGNOSIS — J34.89 SORE NOSE: ICD-10-CM

## 2025-03-31 PROBLEM — C80.1 CANCER (HCC): Status: RESOLVED | Noted: 2022-11-10 | Resolved: 2025-03-31

## 2025-03-31 PROBLEM — C43.9 MALIGNANT MELANOMA OF SKIN (HCC): Status: RESOLVED | Noted: 2024-02-05 | Resolved: 2025-03-31

## 2025-03-31 PROBLEM — R06.2 WHEEZING: Status: RESOLVED | Noted: 2024-12-21 | Resolved: 2025-03-31

## 2025-03-31 PROBLEM — R06.02 SHORTNESS OF BREATH: Chronic | Status: RESOLVED | Noted: 2023-11-13 | Resolved: 2025-03-31

## 2025-03-31 PROBLEM — R21 RASH: Status: RESOLVED | Noted: 2024-06-18 | Resolved: 2025-03-31

## 2025-03-31 PROCEDURE — 99397 PER PM REEVAL EST PAT 65+ YR: CPT | Performed by: INTERNAL MEDICINE

## 2025-03-31 RX ORDER — MUPIROCIN 20 MG/G
OINTMENT TOPICAL 3 TIMES DAILY
Qty: 15 G | Refills: 0 | Status: SHIPPED | OUTPATIENT
Start: 2025-03-31

## 2025-03-31 NOTE — PROGRESS NOTES
Adult Annual Physical  Name: Ina Tolbert      : 1955      MRN: [id number]  Encounter Provider: Tiffany Kamara MD  Encounter Date: 3/31/2025   Encounter department: [facility]    Assessment & Plan  Annual physical exam         Crohn's disease of both small and large intestine without complication (HCC)         Cervical myelopathy with cervical radiculopathy  (HCC)         ILD (interstitial lung disease) (HCC)         Screening breast examination    Orders:  •  Mammo screening bilateral w 3d and cad; Future    Sore nose    Orders:  •  mupirocin (BACTROBAN) 2 % ointment; Apply topically 3 (three) times a day    Preventive Screenings:  - Diabetes Screening: screening up-to-date  - Cholesterol Screening: screening not indicated, has hyperlipidemia and risks/benefits discussed   - Hepatitis C screening: screening up-to-date   - HIV screening: risks/benefits discussed   - Cervical cancer screening: screening not indicated   - Breast cancer screening: risks/benefits discussed and orders placed   - Colon cancer screening: screening up-to-date   - Lung cancer screening: screening not indicated   - Osteoporosis screening: has osteoporosis and screening not indicated     Immunizations:  - Immunizations due: Influenza, Prevnar 20 and Zoster (Shingrix)    Counseling/Anticipatory Guidance:  - Alcohol: discussed moderation in alcohol intake and recommendations for healthy alcohol use.   - Dental health: discussed importance of regular tooth brushing, flossing, and dental visits.   - Sexual health: discussed sexually transmitted diseases, partner selection, use of condoms, avoidance of unintended pregnancy, and contraceptive alternatives.   - Diet: discussed recommendations for a healthy/well-balanced diet.   - Exercise: the importance of regular exercise/physical activity was discussed. Recommend exercise 3-5 times per week for at least 30 minutes.   - Injury prevention: discussed safety/seat belts, safety  "helmets, smoke detectors, carbon monoxide detectors, and smoking near bedding or upholstery.          History of Present Illness     Adult Annual Physical:  Patient presents for annual physical. Here for CPE. .     Diet and Physical Activity:  - Diet/Nutrition: no special diet.  - Exercise: walking and 3-4 times a week on average.    Depression Screening:    - PHQ-9 Score: 1    General Health:  - Sleep: 7-8 hours of sleep on average.  - Hearing: decreased hearing right ear.  - Vision: vision problems, most recent eye exam < 1 year ago and wears glasses.  - Dental: regular dental visits, brushes teeth twice daily and floss regularly.    /GYN Health:  - Follows with GYN: no.   - Menopause: postmenopausal.   - History of STDs: no  - Contraception: male partner had vasectomy.      Advanced Care Planning:  - Has an advanced directive?: no    - Has a durable medical POA?: no      Review of Systems   Constitutional:  Negative for chills and fever.   HENT:  Negative for ear pain and sore throat.    Eyes:  Negative for pain and visual disturbance.   Respiratory:  Negative for cough and shortness of breath.    Cardiovascular:  Negative for chest pain and palpitations.   Gastrointestinal:  Negative for abdominal pain and vomiting.   Genitourinary:  Negative for dysuria and hematuria.   Musculoskeletal:  Negative for arthralgias and back pain.   Skin:  Negative for color change and rash.   Neurological:  Negative for seizures and syncope.   All other systems reviewed and are negative.        Objective   /76   Pulse 88   Temp 97.5 °F (36.4 °C)   Ht 5' 5.75\" (1.67 m)   Wt 75.8 kg (167 lb 3.2 oz)   LMP  (LMP Unknown)   BMI 27.19 kg/m²     Physical Exam  Constitutional:       General: She is not in acute distress.     Appearance: She is well-developed. She is not diaphoretic.   HENT:      Head: Normocephalic and atraumatic.      Right Ear: External ear normal.      Left Ear: External ear normal.      Nose: Nose " normal.   Eyes:      Pupils: Pupils are equal, round, and reactive to light.   Neck:      Thyroid: No thyromegaly.      Vascular: No JVD.   Cardiovascular:      Rate and Rhythm: Regular rhythm.      Heart sounds: No murmur heard.     No friction rub. No gallop.   Pulmonary:      Effort: Pulmonary effort is normal.      Breath sounds: Normal breath sounds. No wheezing or rales.   Abdominal:      General: Bowel sounds are normal. There is no distension.      Palpations: Abdomen is soft.      Tenderness: There is no abdominal tenderness.   Musculoskeletal:         General: Normal range of motion.      Cervical back: Normal range of motion and neck supple.   Skin:     General: Skin is warm and dry.      Findings: No rash.   Neurological:      Mental Status: She is alert and oriented to person, place, and time.      Cranial Nerves: No cranial nerve deficit.      Sensory: No sensory deficit.      Motor: No abnormal muscle tone.      Coordination: Coordination normal.      Deep Tendon Reflexes: Reflexes normal.   Psychiatric:         Behavior: Behavior normal.         Thought Content: Thought content normal.         Judgment: Judgment normal.

## 2025-03-31 NOTE — PATIENT INSTRUCTIONS
"Patient Education     Routine physical for adults   The Basics   Written by the doctors and editors at [facility]   What is a physical? -- A physical is a routine visit, or \"check-up,\" with your doctor. You might also hear it called a \"wellness visit\" or \"preventive visit.\"  During each visit, the doctor will:   Ask about your physical and mental health   Ask about your habits, behaviors, and lifestyle   Do an exam   Give you vaccines if needed   Talk to you about any medicines you take   Give advice about your health   Answer your questions  Getting regular check-ups is an important part of taking care of your health. It can help your doctor find and treat any problems you have. But it's also important for preventing health problems.  A routine physical is different from a \"sick visit.\" A sick visit is when you see a doctor because of a health concern or problem. Since physicals are scheduled ahead of time, you can think about what you want to ask the doctor.  How often should I get a physical? -- It depends on your age and health. In general, for people age 21 years and older:   If you are younger than 50 years, you might be able to get a physical every 3 years.   If you are 50 years or older, your doctor might recommend a physical every year.  If you have an ongoing health condition, like diabetes or high blood pressure, your doctor will probably want to see you more often.  What happens during a physical? -- In general, each visit will include:   Physical exam - The doctor or nurse will check your height, weight, heart rate, and blood pressure. They will also look at your eyes and ears. They will ask about how you are feeling and whether you have any symptoms that bother you.   Medicines - It's a good idea to bring a list of all the medicines you take to each doctor visit. Your doctor will talk to you about your medicines and answer any questions. Tell them if you are having any side effects that bother you. You " "should also tell them if you are having trouble paying for any of your medicines.   Habits and behaviors - This includes:   Your diet   Your exercise habits   Whether you smoke, drink alcohol, or use drugs   Whether you are sexually active   Whether you feel safe at home  Your doctor will talk to you about things you can do to improve your health and lower your risk of health problems. They will also offer help and support. For example, if you want to quit smoking, they can give you advice and might prescribe medicines. If you want to improve your diet or get more physical activity, they can help you with this, too.   Lab tests, if needed - The tests you get will depend on your age and situation. For example, your doctor might want to check your:   Cholesterol   Blood sugar   Iron level   Vaccines - The recommended vaccines will depend on your age, health, and what vaccines you already had. Vaccines are very important because they can prevent certain serious or deadly infections.   Discussion of screening - \"Screening\" means checking for diseases or other health problems before they cause symptoms. Your doctor can recommend screening based on your age, risk, and preferences. This might include tests to check for:   Cancer, such as breast, prostate, cervical, ovarian, colorectal, prostate, lung, or skin cancer   Sexually transmitted infections, such as chlamydia and gonorrhea   Mental health conditions like depression and anxiety  Your doctor will talk to you about the different types of screening tests. They can help you decide which screenings to have. They can also explain what the results might mean.   Answering questions - The physical is a good time to ask the doctor or nurse questions about your health. If needed, they can refer you to other doctors or specialists, too.  Adults older than 65 years often need other care, too. As you get older, your doctor will talk to you about:   How to prevent falling at " home   Hearing or vision tests   Memory testing   How to take your medicines safely   Making sure that you have the help and support you need at home  All topics are updated as new evidence becomes available and our peer review process is complete.  This topic retrieved from [employer] on: May 02, 2024.  Topic 329025 Version 1.0  Release: 32.4.3 - C32.122  © 2024 UpToDate, Inc. and/or its affiliates. All rights reserved.  Consumer Information Use and Disclaimer   Disclaimer: This generalized information is a limited summary of diagnosis, treatment, and/or medication information. It is not meant to be comprehensive and should be used as a tool to help the user understand and/or assess potential diagnostic and treatment options. It does NOT include all information about conditions, treatments, medications, side effects, or risks that may apply to a specific patient. It is not intended to be medical advice or a substitute for the medical advice, diagnosis, or treatment of a health care provider based on the health care provider's examination and assessment of a patient's specific and unique circumstances. Patients must speak with a health care provider for complete information about their health, medical questions, and treatment options, including any risks or benefits regarding use of medications. This information does not endorse any treatments or medications as safe, effective, or approved for treating a specific patient. UpToDate, Inc. and its affiliates disclaim any warranty or liability relating to this information or the use thereof.The use of this information is governed by the Terms of Use, available at https://www.woltersSendbloomuwer.com/en/know/clinical-effectiveness-terms. 2024© UpToDate, Inc. and its affiliates and/or licensors. All rights reserved.  Copyright   © 2024 UpToDate, Inc. and/or its affiliates. All rights reserved.

## 2025-04-09 ENCOUNTER — APPOINTMENT (EMERGENCY)
Dept: RADIOLOGY | Facility: HOSPITAL | Age: 70
End: 2025-04-09
Payer: COMMERCIAL

## 2025-04-09 ENCOUNTER — HOSPITAL ENCOUNTER (EMERGENCY)
Facility: HOSPITAL | Age: 70
Discharge: HOME | End: 2025-04-09
Attending: EMERGENCY MEDICINE
Payer: COMMERCIAL

## 2025-04-09 VITALS
DIASTOLIC BLOOD PRESSURE: 83 MMHG | HEART RATE: 92 BPM | OXYGEN SATURATION: 98 % | RESPIRATION RATE: 16 BRPM | SYSTOLIC BLOOD PRESSURE: 143 MMHG | TEMPERATURE: 100.4 F

## 2025-04-09 DIAGNOSIS — F41.9 ANXIETY AND DEPRESSION: ICD-10-CM

## 2025-04-09 DIAGNOSIS — K52.9 GASTROENTERITIS: Primary | ICD-10-CM

## 2025-04-09 DIAGNOSIS — F32.A ANXIETY AND DEPRESSION: ICD-10-CM

## 2025-04-09 DIAGNOSIS — K86.2 PANCREATIC CYST: ICD-10-CM

## 2025-04-09 LAB
ALBUMIN SERPL BCG-MCNC: 4.2 G/DL (ref 3.5–5)
ALP SERPL-CCNC: 68 U/L (ref 34–104)
ALT SERPL W P-5'-P-CCNC: 22 U/L (ref 7–52)
ANION GAP SERPL CALCULATED.3IONS-SCNC: 8 MMOL/L (ref 4–13)
AST SERPL W P-5'-P-CCNC: 26 U/L (ref 13–39)
BACTERIA UR QL AUTO: NORMAL /HPF
BASOPHILS # BLD AUTO: 0.01 THOUSANDS/ÂΜL (ref 0–0.1)
BASOPHILS NFR BLD AUTO: 0 % (ref 0–1)
BILIRUB SERPL-MCNC: 0.69 MG/DL (ref 0.2–1)
BILIRUB UR QL STRIP: NEGATIVE
BUN SERPL-MCNC: 16 MG/DL (ref 5–25)
CALCIUM SERPL-MCNC: 9.3 MG/DL (ref 8.4–10.2)
CHLORIDE SERPL-SCNC: 107 MMOL/L (ref 96–108)
CLARITY UR: CLEAR
CO2 SERPL-SCNC: 22 MMOL/L (ref 21–32)
COLOR UR: YELLOW
CREAT SERPL-MCNC: 0.85 MG/DL (ref 0.6–1.3)
EOSINOPHIL # BLD AUTO: 0.13 THOUSAND/ÂΜL (ref 0–0.61)
EOSINOPHIL NFR BLD AUTO: 2 % (ref 0–6)
ERYTHROCYTE [DISTWIDTH] IN BLOOD BY AUTOMATED COUNT: 14.1 % (ref 11.6–15.1)
GFR SERPL CREATININE-BSD FRML MDRD: 70 ML/MIN/1.73SQ M
GLUCOSE SERPL-MCNC: 126 MG/DL (ref 65–140)
GLUCOSE UR STRIP-MCNC: NEGATIVE MG/DL
HCT VFR BLD AUTO: 44 % (ref 34.8–46.1)
HGB BLD-MCNC: 14.3 G/DL (ref 11.5–15.4)
HGB UR QL STRIP.AUTO: NEGATIVE
IMM GRANULOCYTES # BLD AUTO: 0.02 THOUSAND/UL (ref 0–0.2)
IMM GRANULOCYTES NFR BLD AUTO: 0 % (ref 0–2)
KETONES UR STRIP-MCNC: NEGATIVE MG/DL
LEUKOCYTE ESTERASE UR QL STRIP: NEGATIVE
LIPASE SERPL-CCNC: 9 U/L (ref 11–82)
LYMPHOCYTES # BLD AUTO: 0.54 THOUSANDS/ÂΜL (ref 0.6–4.47)
LYMPHOCYTES NFR BLD AUTO: 9 % (ref 14–44)
MCH RBC QN AUTO: 26.9 PG (ref 26.8–34.3)
MCHC RBC AUTO-ENTMCNC: 32.5 G/DL (ref 31.4–37.4)
MCV RBC AUTO: 83 FL (ref 82–98)
MONOCYTES # BLD AUTO: 0.35 THOUSAND/ÂΜL (ref 0.17–1.22)
MONOCYTES NFR BLD AUTO: 6 % (ref 4–12)
NEUTROPHILS # BLD AUTO: 5 THOUSANDS/ÂΜL (ref 1.85–7.62)
NEUTS SEG NFR BLD AUTO: 83 % (ref 43–75)
NITRITE UR QL STRIP: NEGATIVE
NON-SQ EPI CELLS URNS QL MICRO: NORMAL /HPF
NRBC BLD AUTO-RTO: 0 /100 WBCS
PH UR STRIP.AUTO: 7.5 [PH]
PLATELET # BLD AUTO: 195 THOUSANDS/UL (ref 149–390)
PMV BLD AUTO: 10.3 FL (ref 8.9–12.7)
POTASSIUM SERPL-SCNC: 4.2 MMOL/L (ref 3.5–5.3)
PROT SERPL-MCNC: 7.2 G/DL (ref 6.4–8.4)
PROT UR STRIP-MCNC: ABNORMAL MG/DL
RBC # BLD AUTO: 5.31 MILLION/UL (ref 3.81–5.12)
RBC #/AREA URNS AUTO: NORMAL /HPF
SODIUM SERPL-SCNC: 137 MMOL/L (ref 135–147)
SP GR UR STRIP.AUTO: 1.01 (ref 1–1.03)
UROBILINOGEN UR STRIP-ACNC: <2 MG/DL
WBC # BLD AUTO: 6.05 THOUSAND/UL (ref 4.31–10.16)
WBC #/AREA URNS AUTO: NORMAL /HPF

## 2025-04-09 PROCEDURE — 99284 EMERGENCY DEPT VISIT MOD MDM: CPT

## 2025-04-09 PROCEDURE — 96361 HYDRATE IV INFUSION ADD-ON: CPT

## 2025-04-09 PROCEDURE — 96376 TX/PRO/DX INJ SAME DRUG ADON: CPT

## 2025-04-09 PROCEDURE — 74177 CT ABD & PELVIS W/CONTRAST: CPT

## 2025-04-09 PROCEDURE — 96365 THER/PROPH/DIAG IV INF INIT: CPT

## 2025-04-09 PROCEDURE — 96375 TX/PRO/DX INJ NEW DRUG ADDON: CPT

## 2025-04-09 PROCEDURE — 93005 ELECTROCARDIOGRAM TRACING: CPT

## 2025-04-09 PROCEDURE — 36415 COLL VENOUS BLD VENIPUNCTURE: CPT | Performed by: EMERGENCY MEDICINE

## 2025-04-09 PROCEDURE — 85025 COMPLETE CBC W/AUTO DIFF WBC: CPT | Performed by: EMERGENCY MEDICINE

## 2025-04-09 PROCEDURE — 81001 URINALYSIS AUTO W/SCOPE: CPT | Performed by: EMERGENCY MEDICINE

## 2025-04-09 PROCEDURE — 83690 ASSAY OF LIPASE: CPT | Performed by: EMERGENCY MEDICINE

## 2025-04-09 PROCEDURE — 80053 COMPREHEN METABOLIC PANEL: CPT | Performed by: EMERGENCY MEDICINE

## 2025-04-09 RX ORDER — ONDANSETRON 2 MG/ML
4 INJECTION INTRAMUSCULAR; INTRAVENOUS ONCE
Status: COMPLETED | OUTPATIENT
Start: 2025-04-09 | End: 2025-04-09

## 2025-04-09 RX ORDER — ONDANSETRON 4 MG/1
4 TABLET, ORALLY DISINTEGRATING ORAL EVERY 6 HOURS PRN
Qty: 15 TABLET | Refills: 0 | Status: SHIPPED | OUTPATIENT
Start: 2025-04-09

## 2025-04-09 RX ORDER — FAMOTIDINE 10 MG/ML
20 INJECTION, SOLUTION INTRAVENOUS ONCE
Status: COMPLETED | OUTPATIENT
Start: 2025-04-09 | End: 2025-04-09

## 2025-04-09 RX ORDER — ACETAMINOPHEN 10 MG/ML
1000 INJECTION, SOLUTION INTRAVENOUS ONCE
Status: COMPLETED | OUTPATIENT
Start: 2025-04-09 | End: 2025-04-09

## 2025-04-09 RX ADMIN — FAMOTIDINE 20 MG: 10 INJECTION, SOLUTION INTRAVENOUS at 21:02

## 2025-04-09 RX ADMIN — ONDANSETRON 4 MG: 2 INJECTION INTRAMUSCULAR; INTRAVENOUS at 21:59

## 2025-04-09 RX ADMIN — ACETAMINOPHEN 1000 MG: 10 INJECTION INTRAVENOUS at 21:08

## 2025-04-09 RX ADMIN — SODIUM CHLORIDE 1000 ML: 0.9 INJECTION, SOLUTION INTRAVENOUS at 21:02

## 2025-04-09 RX ADMIN — IOHEXOL 100 ML: 350 INJECTION, SOLUTION INTRAVENOUS at 22:06

## 2025-04-09 RX ADMIN — ONDANSETRON 4 MG: 2 INJECTION INTRAMUSCULAR; INTRAVENOUS at 21:03

## 2025-04-10 ENCOUNTER — VBI (OUTPATIENT)
Dept: FAMILY MEDICINE CLINIC | Facility: CLINIC | Age: 70
End: 2025-04-10

## 2025-04-10 LAB
ATRIAL RATE: 92 BPM
P AXIS: 67 DEGREES
PR INTERVAL: 168 MS
QRS AXIS: -1 DEGREES
QRSD INTERVAL: 100 MS
QT INTERVAL: 362 MS
QTC INTERVAL: 447 MS
T WAVE AXIS: 52 DEGREES
VENTRICULAR RATE: 92 BPM

## 2025-04-10 PROCEDURE — 93010 ELECTROCARDIOGRAM REPORT: CPT | Performed by: INTERNAL MEDICINE

## 2025-04-10 NOTE — DISCHARGE INSTRUCTIONS
At this time your CT scan is showing enteritis which is known as stomach virus. At this time you will continue zofran at home every 6 hours as needed for nausea.  Please follow up with your primary care provider otherwise. You also have pancreatic cysts on your CT which need MRI follow up as an outpatient.    Return to the ER if you have any worsening symptoms.

## 2025-04-10 NOTE — TELEPHONE ENCOUNTER
04/10/25 8:40 AM    Patient contacted post ED visit, first outreach attempt made. Message was left for patient to return a call to the VBI Department at [facility]: Phone [phone].    Thank you.  CASSIE JAIN MA  [facility]

## 2025-04-10 NOTE — ED PROVIDER NOTES
ED Disposition       None          Assessment & Plan       Medical Decision Making  Pulse ox 97% on room air indicating adequate oxygenation.        Differential diagnose include but not limited to arrhythmia, gastritis, viral gastroenteritis, dehydration, electrolyte abnormality, OSMAN, small bowel obstruction    Signed out to next provider Dr. Lloyd.    Amount and/or Complexity of Data Reviewed  Labs: ordered.  Radiology: ordered.  ECG/medicine tests: ordered and independent interpretation performed.    Risk  Prescription drug management.             Medications   sodium chloride 0.9 % bolus 1,000 mL (has no administration in time range)   ondansetron (ZOFRAN) injection 4 mg (has no administration in time range)   acetaminophen (Ofirmev) injection 1,000 mg (has no administration in time range)   Famotidine (PF) (PEPCID) injection 20 mg (has no administration in time range)       ED Risk Strat Scores                    No data recorded                            History of Present Illness       Chief Complaint   Patient presents with    Vomiting     Started with vomiting at 4am with abd pain as well. Some soft stools. Slight fever in triage       Past Medical History:   Diagnosis Date    Abdominal adhesions     Alcoholism (HCC) 1996    in remission    Anesthesia complication     difficult to wake up    Anxiety     Arthritis     Asthma     with exertion    Cancer (HCC)     melanoma    Colon polyp     Crohn's disease (HCC)     Depression     Depression     Disease of thyroid gland     Fibromyalgia     Fibromyalgia, primary     Fractures 2006    GERD (gastroesophageal reflux disease)     History of cholecystectomy 09/07/2019    Hyperlipidemia     Infected tooth 01/2024    Tooth #14-was treated with antibiotics-endodontal appt 2/1 may need root canal    Irregular heart beat     can be tachy; also has orthoststic hypotension    Irritable bowel syndrome 1990    Lazy eye     resolved: 3/27/17    Medical clearance for  psychiatric admission 07/13/2021    Melanoma ([facility]) 2010    Mild asthma 11/04/2020    Osteoarthritis 07/2018    Osteopenia     with joint pain-elevated DEV    Polymyalgia (HCC) 2/10/2022    Psychiatric disorder     Shortness of breath     assoc with tachycardia    Stomach disorder 1995    Tinnitus     Wears glasses     for reading      Past Surgical History:   Procedure Laterality Date    ABDOMINAL SURGERY      lysis of adhesions x 2    APPENDECTOMY      CERVICAL FUSION      May 5,2021 and Jan. 01,2020    CHOLECYSTECTOMY      open    COLONOSCOPY      DILATION AND CURETTAGE OF UTERUS      FOOT SURGERY  05/2017    NECK SURGERY  01/2019 5/2021    NEUROMA EXCISION Right 05/26/2017    Procedure: EXCISION MASS / FIBROMA FOOT;  Surgeon: Jorden Crespo DPM;  Location: [facility] MAIN OR;  Service:     PARS PLANA VITRECTOMY W/ REPAIR OF MACULAR HOLE  12/23/2020    KS ARTHRP KNE CONDYLE&PLATU MEDIAL&LAT COMPARTMENTS Right 2/6/2024    Procedure: ARTHROPLASTY KNEE TOTAL W ROBOT - RIGHT - PRESS FIT - OVERNIGHT;  Surgeon: Jairon Kim DO;  Location: [facility];  Service: Orthopedics    KS XCAPSL CTRC RMVL INSJ IO LENS PROSTH W/O ECP Left 12/06/2021    Procedure: EXTRACTION EXTRACAPSULAR CATARACT PHACO INTRAOCULAR LENS (IOL);  Surgeon: Mandeep Chavez MD;  Location: [facility] MAIN OR;  Service: Ophthalmology    RIGHT OOPHORECTOMY      UPPER GASTROINTESTINAL ENDOSCOPY  5/2019    WISDOM TOOTH EXTRACTION      x4      Family History   Problem Relation Age of Onset    Heart disease Mother     Stroke Mother 62    COPD Mother     Arthritis Mother     Asthma Mother     Hypertension Father     Dislocations Sister     Multiple sclerosis Sister     Neurological problems Sister     Scoliosis Sister     Depression Sister     Mental illness Sister     No Known Problems Maternal Grandmother     No Known Problems Maternal Grandfather     No Known Problems Paternal Grandmother     No Known Problems Paternal Grandfather     Kidney disease Brother          kidney transplant    No Known Problems Maternal Aunt     No Known Problems Maternal Uncle     No Known Problems Paternal Aunt     No Known Problems Paternal Uncle     ADD / ADHD Neg Hx     Anesthesia problems Neg Hx     Cancer Neg Hx     Clotting disorder Neg Hx     Collagen disease Neg Hx     Diabetes Neg Hx     Dislocations Neg Hx     Learning disabilities Neg Hx     Neurological problems Neg Hx     Osteoporosis Neg Hx     Rheumatologic disease Neg Hx     Scoliosis Neg Hx     Vascular Disease Neg Hx       Social History     Tobacco Use    Smoking status: Never     Passive exposure: Never    Smokeless tobacco: Never   Vaping Use    Vaping status: Never Used   Substance Use Topics    Alcohol use: Not Currently    Drug use: No      E-Cigarette/Vaping    E-Cigarette Use Never User       E-Cigarette/Vaping Substances    Nicotine No     THC No     CBD No     Flavoring No     Other No     Unknown No       I have reviewed and agree with the history as documented.     Patient presents for evaluation of nausea and vomiting starting at 4 AM this morning.  She has abdominal pain mostly in the epigastric area and has been having some soft stools.  Noted to have a slight fever in triage.  No known sick contacts or recent travel.      History provided by:  Patient   used: No    Vomiting  Associated symptoms: abdominal pain, diarrhea and fever        Review of Systems   Constitutional:  Positive for fever.   Gastrointestinal:  Positive for abdominal pain, diarrhea, nausea and vomiting. Negative for blood in stool.   All other systems reviewed and are negative.          Objective       ED Triage Vitals [04/09/25 1915]   Temperature Pulse Blood Pressure Respirations SpO2 Patient Position - Orthostatic VS   100.4 °F (38 °C) (!) 134 131/79 (!) 28 97 % Sitting      Temp Source Heart Rate Source BP Location FiO2 (%) Pain Score    Tympanic Monitor Left arm -- 10 - Worst Possible Pain      Vitals      Date and Time  Temp Pulse SpO2 Resp BP Pain Score FACES Pain Rating User   04/09/25 1915 100.4 °F (38 °C) 134 97 % 28 131/79 10 - Worst Possible Pain -- LS            Physical Exam  Vitals and nursing note reviewed.   Constitutional:       General: She is not in acute distress.     Appearance: She is ill-appearing.   HENT:      Mouth/Throat:      Mouth: Mucous membranes are dry.      Pharynx: Oropharynx is clear.   Cardiovascular:      Rate and Rhythm: Regular rhythm. Tachycardia present.   Pulmonary:      Effort: Pulmonary effort is normal. No respiratory distress.      Breath sounds: Normal breath sounds.   Abdominal:      Tenderness: There is abdominal tenderness.      Comments: Epigastric tenderness   Neurological:      General: No focal deficit present.      Mental Status: She is alert and oriented to person, place, and time.         Results Reviewed       Procedure Component Value Units Date/Time    Comprehensive metabolic panel [[id number]] Collected: 04/09/25 2039    Lab Status: In process Specimen: Blood from Arm, Right Updated: 04/09/25 2043    Lipase [[id number]] Collected: 04/09/25 2039    Lab Status: In process Specimen: Blood from Arm, Right Updated: 04/09/25 2043    CBC and differential [[id number]] Collected: 04/09/25 2039    Lab Status: In process Specimen: Blood from Arm, Right Updated: 04/09/25 2043    UA (URINE) with reflex to Scope [[id number]]     Lab Status: No result Specimen: Urine             CT abdomen pelvis with contrast    (Results Pending)       ECG 12 Lead Documentation Only    Date/Time: 4/9/2025 8:43 PM    Performed by: Mandeep Salvador DO  Authorized by: Mandeep Salvador DO    ECG reviewed by me, the ED Provider: yes    Patient location:  ED  Interpretation:     Interpretation: abnormal    Rate:     ECG rate:  92    ECG rate assessment: normal    Rhythm:     Rhythm: sinus rhythm    Ectopy:     Ectopy: none    Conduction:     Conduction: abnormal      Abnormal conduction: incomplete RBBB    ST segments:      ST segments:  Normal      ED Medication and Procedure Management   Prior to Admission Medications   Prescriptions Last Dose Informant Patient Reported? Taking?   Calcium Carb-Cholecalciferol 600-12.5 MG-MCG CAPS  Self Yes No   Sig: Take by mouth daily in the early morning   DULoxetine (CYMBALTA) 60 mg delayed release capsule   No No   Sig: TAKE 1 CAPSULE BY MOUTH EVERY DAY   Denta 5000 Plus 1.1 % CREA  Self Yes No   Sig: USE ONCE DAILY AT NIGHT   Fluticasone-Salmeterol (Wixela Inhub) 100-50 mcg/dose inhaler   No No   Sig: Inhale 1 puff 2 (two) times a day Rinse mouth after use.   Patient taking differently: Inhale 1 puff as needed Rinse mouth after use.   QUEtiapine (SEROquel) 100 mg tablet   No No   Sig: TAKE 1 TABLET BY MOUTH DAILY AT BEDTIME   albuterol (2.5 mg/3 mL) 0.083 % nebulizer solution   No No   Sig: Take 3 mL (2.5 mg total) by nebulization every 6 (six) hours as needed for wheezing or shortness of breath   albuterol (2.5 mg/3 mL) 0.083 % nebulizer solution   No No   Sig: Take 3 mL (2.5 mg total) by nebulization every 6 (six) hours as needed for wheezing or shortness of breath   albuterol (PROVENTIL HFA,VENTOLIN HFA) 90 mcg/act inhaler   No No   Sig: Inhale 2 puffs every 6 (six) hours as needed for wheezing or shortness of breath   Patient not taking: Reported on 1/20/2025   albuterol (ProAir HFA) 90 mcg/act inhaler  Self No No   Sig: Inhale 2 puffs every 6 (six) hours as needed for wheezing   benzonatate (TESSALON PERLES) 100 mg capsule   No No   Sig: Take 1 capsule (100 mg total) by mouth 2 (two) times a day as needed for cough   Patient not taking: Reported on 2/11/2025   benzonatate (TESSALON PERLES) 100 mg capsule   No No   Sig: Take 1 capsule (100 mg total) by mouth 3 (three) times a day as needed for cough   docusate sodium (COLACE) 100 mg capsule  Self No No   Sig: Take 1 capsule (100 mg total) by mouth 2 (two) times a day   Patient not taking: Reported on 1/25/2025   esomeprazole (NexIUM) 40  MG capsule   No No   Sig: TAKE 1 CAPSULE BY MOUTH EVERY DAY BEFORE BREAKFAST   gabapentin (NEURONTIN) 300 mg capsule  Self No No   Sig: Take 1 capsule (300 mg total) by mouth 3 (three) times a day   levothyroxine 50 mcg tablet   No No   Sig: TAKE 1 TABLET BY MOUTH EVERY DAY   midodrine (PROAMATINE) 5 mg tablet  Self Yes No   Sig: Take 5 mg by mouth daily   mupirocin (BACTROBAN) 2 % ointment   No No   Sig: Apply topically 3 (three) times a day      Facility-Administered Medications: None     Patient's Medications   Discharge Prescriptions    No medications on file     No discharge procedures on file.  ED SEPSIS DOCUMENTATION            Mandeep Salvador DO  04/11/25 0918

## 2025-04-10 NOTE — ED NOTES
Patient still reporting nausea after both doses of Zofran. MD made aware.      Darvin Ferrari RN  04/09/25 [id number]

## 2025-04-11 NOTE — TELEPHONE ENCOUNTER
04/11/25 11:56 AM    Patient contacted post ED visit, second outreach attempt made. Message was left for patient to return a call to the VBI Department at [facility]: Phone [phone].    Thank you.  CASSIE JAIN MA  [facility]

## 2025-04-14 NOTE — TELEPHONE ENCOUNTER
04/14/25 10:53 AM    Patient contacted post ED visit, VBI department spoke with patient/caregiver and outreach was successful.    Thank you.  CASSIE JAIN MA  [facility]

## 2025-04-21 ENCOUNTER — OFFICE VISIT (OUTPATIENT)
Dept: PSYCHIATRY | Facility: CLINIC | Age: 70
End: 2025-04-21
Payer: COMMERCIAL

## 2025-04-21 VITALS
BODY MASS INDEX: 25.88 KG/M2 | SYSTOLIC BLOOD PRESSURE: 141 MMHG | HEIGHT: 66 IN | WEIGHT: 161 LBS | HEART RATE: 99 BPM | DIASTOLIC BLOOD PRESSURE: 82 MMHG

## 2025-04-21 DIAGNOSIS — F41.1 GENERALIZED ANXIETY DISORDER: ICD-10-CM

## 2025-04-21 DIAGNOSIS — F33.42 RECURRENT MAJOR DEPRESSIVE DISORDER, IN FULL REMISSION (HCC): Primary | ICD-10-CM

## 2025-04-21 DIAGNOSIS — F51.05 INSOMNIA RELATED TO ANOTHER MENTAL DISORDER: ICD-10-CM

## 2025-04-21 PROCEDURE — 99214 OFFICE O/P EST MOD 30 MIN: CPT | Performed by: NURSE PRACTITIONER

## 2025-04-21 PROCEDURE — 90833 PSYTX W PT W E/M 30 MIN: CPT | Performed by: NURSE PRACTITIONER

## 2025-04-21 NOTE — PSYCH
MEDICATION MANAGEMENT NOTE        [facility]      Name and Date of Birth:  Ina Tolbert 69 y.o. 1955 MRN: [id number]    Insurance: Payor: BLUE CROSS / Plan: [employer] PLAN 280 / Product Type: Blue Fee for Service /     Date of Visit: 2025  This note was not shared with the patient due to this is a psychotherapy note  Reason for Visit:   Chief Complaint   Patient presents with    Anxiety    Depression    Medication Management       Assessment & Plan  Recurrent major depressive disorder, in full remission (HCC)  Cymbalta 60 mg daily  Ina reports medication is effective in managing depression denies depression or suicidal ideation.  PHQ-9 score of 1 indicates no depression or suicidal ideation.  She reports her appetite and sleep patterns are good.  Mother  in  and she is coping with the support of her sisters and her .  Continues with financial stress however she is coping.  Support was provided         Insomnia related to another mental disorder  Seroquel is reported to be effective to help her sleep and her anxiety.  ELLY-7 score of 2 indicates no anxiety.  She is working and reports marital problems or improved.         Generalized anxiety disorder                   Risks/benefits/alternativies to treatment discussed, including potential adverse medication side effects, to which Ina voiced understanding and consented fully to treatment.  Also, patient is amenable to calling/contacting the outpatient office including this writer if any acute adverse effects of their medication regimen arise in addition to any comments or concerns pertaining to their psychiatric management.      Medications Risks/Benefits      Risks, Benefits And Possible Side Effects Of Medications:    Risks, benefits, and possible side effects of medications explained to Ina and she verbalizes understanding and agreement for  treatment.    Controlled Medication Discussion:     Not applicable         Subjective      Ina Tolbert is a 69 y.o. female, visited for Anxiety, Depression, and Medication Management, who was personally seen and evaluated today at the [facility] outpatient clinic for follow-up and medication management. Completes psychiatric assessment without difficulty.     At previous outpatient psychiatric appointment with this writer, Ina was started on Naltrexone for gambling urge, moods were stable.   She denies any current adverse medication side effects.      Overall, Ina reported she is stable, coping with loss of mother, not drinking or gambling. Stopped Naltrexone because she did not feel anything. Appetite is good and she is sleeping.        Review Of Systems:  Pertinent items are noted in HPI; all others are negative; no recent changes in medications or health status reported.    PHQ-2/9 Depression Screening    Little interest or pleasure in doing things: 0 - not at all  Feeling down, depressed, or hopeless: 0 - not at all  Trouble falling or staying asleep, or sleeping too much: 0 - not at all  Feeling tired or having little energy: 0 - not at all  Poor appetite or overeatin - several days  Feeling bad about yourself - or that you are a failure or have let yourself or your family down: 0 - not at all  Trouble concentrating on things, such as reading the newspaper or watching television: 0 - not at all  Moving or speaking so slowly that other people could have noticed. Or the opposite - being so fidgety or restless that you have been moving around a lot more than usual: 0 - not at all  Thoughts that you would be better off dead, or of hurting yourself in some way: 0 - not at all  PHQ-9 Score: 1  PHQ-9 Interpretation: No or Minimal depression         ELLY-7 Flowsheet Screening      Flowsheet Row Most Recent Value   Over the last two weeks, how often have you been bothered by the  following problems?     Feeling nervous, anxious, or on edge 0   Not being able to stop or control worrying 1   Worrying too much about different things 1   Trouble relaxing  0   Being so restless that it's hard to sit still 0   Becoming easily annoyed or irritable  0   Feeling afraid as if something awful might happen 0   How difficult have these problems made it for you to do your work, take care of things at home, or get along with other people?  Not difficult at all   ELLY Score  2            Historical Information    Past Psychiatric History Update:   - No inpatient psychiatric admission since last encounter  - No SA or SIB since last encounter  - No incidence of violent behavior since last encounter    Past Trauma History Update:   - No new onset of abuse or traumatic events since last encounter     Past Medical History:    Past Medical History:   Diagnosis Date    Abdominal adhesions     Alcoholism ([facility]) 1996    in remission    Anesthesia complication     difficult to wake up    Anxiety     Arthritis     Asthma     with exertion    Cancer (HCC)     melanoma    Colon polyp     Crohn's disease ([facility])     Depression     Depression     Disease of thyroid gland     Fibromyalgia     Fibromyalgia, primary     Fractures 2006    GERD (gastroesophageal reflux disease)     History of cholecystectomy 09/07/2019    Hyperlipidemia     Infected tooth 01/2024    Tooth #14-was treated with antibiotics-endodontal appt 2/1 may need root canal    Irregular heart beat     can be tachy; also has orthoststic hypotension    Irritable bowel syndrome 1990    Lazy eye     resolved: 3/27/17    Medical clearance for psychiatric admission 07/13/2021    Melanoma ([facility]) 2010    Mild asthma 11/04/2020    Osteoarthritis 07/2018    Osteopenia     with joint pain-elevated DEV    Polymyalgia ([facility]) 2/10/2022    Psychiatric disorder     Shortness of breath     assoc with tachycardia    Stomach disorder 1995    Tinnitus     Wears glasses     for  reading        Past Surgical History:   Procedure Laterality Date    ABDOMINAL SURGERY      lysis of adhesions x 2    APPENDECTOMY      CERVICAL FUSION      May 5,2021 and Jan. 01,2020    CHOLECYSTECTOMY      open    COLONOSCOPY      DILATION AND CURETTAGE OF UTERUS      FOOT SURGERY  05/2017    NECK SURGERY  01/2019 5/2021    NEUROMA EXCISION Right 05/26/2017    Procedure: EXCISION MASS / FIBROMA FOOT;  Surgeon: Jorden Crespo DPM;  Location: [facility] MAIN OR;  Service:     PARS PLANA VITRECTOMY W/ REPAIR OF MACULAR HOLE  12/23/2020    CA ARTHRP KNE CONDYLE&PLATU MEDIAL&LAT COMPARTMENTS Right 2/6/2024    Procedure: ARTHROPLASTY KNEE TOTAL W ROBOT - RIGHT - PRESS FIT - OVERNIGHT;  Surgeon: Jairon Kim DO;  Location: [facility];  Service: Orthopedics    CA XCAPSL CTRC RMVL INSJ IO LENS PROSTH W/O ECP Left 12/06/2021    Procedure: EXTRACTION EXTRACAPSULAR CATARACT PHACO INTRAOCULAR LENS (IOL);  Surgeon: Mandeep Chavez MD;  Location: [facility] MAIN OR;  Service: Ophthalmology    RIGHT OOPHORECTOMY      UPPER GASTROINTESTINAL ENDOSCOPY  5/2019    WISDOM TOOTH EXTRACTION      x4     Allergies   Allergen Reactions    Meperidine Lightheadedness and Other (See Comments)    Sulfa Antibiotics Hives       Substance Abuse History:    Social History     Substance and Sexual Activity   Alcohol Use Not Currently     Social History     Substance and Sexual Activity   Drug Use No       Social History:    Social History     Socioeconomic History    Marital status: /Civil Union     Spouse name: Not on file    Number of children: Not on file    Years of education: Not on file    Highest education level: Not on file   Occupational History    Not on file   Tobacco Use    Smoking status: Never     Passive exposure: Never    Smokeless tobacco: Never   Vaping Use    Vaping status: Never Used   Substance and Sexual Activity    Alcohol use: Not Currently    Drug use: No    Sexual activity: Not Currently     Partners: Male   Other Topics  Concern    Not on file   Social History Narrative    Not on file     Social Drivers of Health     Financial Resource Strain: Not on file   Food Insecurity: No Food Insecurity (3/31/2025)    Hunger Vital Sign     Worried About Running Out of Food in the Last Year: Never true     Ran Out of Food in the Last Year: Never true   Transportation Needs: No Transportation Needs (3/31/2025)    PRAPARE - Transportation     Lack of Transportation (Medical): No     Lack of Transportation (Non-Medical): No   Physical Activity: Not on file   Stress: Not on file   Social Connections: Not on file   Intimate Partner Violence: Not on file   Housing Stability: Unknown (3/31/2025)    Housing Stability Vital Sign     Unable to Pay for Housing in the Last Year: Patient declined     Number of Times Moved in the Last Year: 0     Homeless in the Last Year: No       Family Psychiatric History:     Family History   Problem Relation Age of Onset    Heart disease Mother     Stroke Mother 62    COPD Mother     Arthritis Mother     Asthma Mother     Hypertension Father     Dislocations Sister     Multiple sclerosis Sister     Neurological problems Sister     Scoliosis Sister     Depression Sister     Mental illness Sister     No Known Problems Maternal Grandmother     No Known Problems Maternal Grandfather     No Known Problems Paternal Grandmother     No Known Problems Paternal Grandfather     Kidney disease Brother         kidney transplant    No Known Problems Maternal Aunt     No Known Problems Maternal Uncle     No Known Problems Paternal Aunt     No Known Problems Paternal Uncle     ADD / ADHD Neg Hx     Anesthesia problems Neg Hx     Cancer Neg Hx     Clotting disorder Neg Hx     Collagen disease Neg Hx     Diabetes Neg Hx     Dislocations Neg Hx     Learning disabilities Neg Hx     Neurological problems Neg Hx     Osteoporosis Neg Hx     Rheumatologic disease Neg Hx     Scoliosis Neg Hx     Vascular Disease Neg Hx        History Review:  "The following portions of the patient's history were reviewed and updated as appropriate: allergies, current medications, past family history, past medical history, past social history, past surgical history and problem list.           Objective      Vital signs in last 24 hours:  Vitals:    04/21/25 0847   BP: 141/82   Pulse: 99   Weight: 73 kg (161 lb)   Height: 5' 5.75\" (1.67 m)       Mental Status Evaluation:    Appearance age appropriate, casually dressed   Behavior cooperative, calm   Speech normal rate, normal volume, normal pitch   Mood normal   Affect normal range and intensity, appropriate   Thought Processes organized, goal directed   Associations intact associations   Thought Content no overt delusions   Perceptual Disturbances: no auditory hallucinations, no visual hallucinations   Abnormal Thoughts  Risk Potential Suicidal ideation - None  Homicidal ideation - None  Potential for aggression - No   Orientation oriented to: person, place, time/date, and situation   Memory recent and remote memory grossly intact   Consciousness alert and awake   Attention Span Concentration Span attention span and concentration are age appropriate   Intellect not formally assessed   Insight intact   Judgement intact   Muscle Strength and  Gait normal muscle strength and normal muscle tone, normal gait and normal balance   Motor activity no abnormal movements   Language no difficulty naming common objects   Fund of Knowledge adequate knowledge of current events   Pain none   Pain Scale 0             Current Outpatient Medications   Medication Sig Dispense Refill    albuterol (2.5 mg/3 mL) 0.083 % nebulizer solution Take 3 mL (2.5 mg total) by nebulization every 6 (six) hours as needed for wheezing or shortness of breath 75 mL 0    albuterol (2.5 mg/3 mL) 0.083 % nebulizer solution Take 3 mL (2.5 mg total) by nebulization every 6 (six) hours as needed for wheezing or shortness of breath 3 mL 0    albuterol (ProAir HFA) 90 " mcg/act inhaler Inhale 2 puffs every 6 (six) hours as needed for wheezing 8.5 g 6    benzonatate (TESSALON PERLES) 100 mg capsule Take 1 capsule (100 mg total) by mouth 3 (three) times a day as needed for cough 30 capsule 0    Calcium Carb-Cholecalciferol 600-12.5 MG-MCG CAPS Take by mouth daily in the early morning      Denta 5000 Plus 1.1 % CREA USE ONCE DAILY AT NIGHT      DULoxetine (CYMBALTA) 60 mg delayed release capsule TAKE 1 CAPSULE BY MOUTH EVERY DAY 90 capsule 1    esomeprazole (NexIUM) 40 MG capsule TAKE 1 CAPSULE BY MOUTH EVERY DAY BEFORE BREAKFAST 90 capsule 1    Fluticasone-Salmeterol (Wixela Inhub) 100-50 mcg/dose inhaler Inhale 1 puff 2 (two) times a day Rinse mouth after use. (Patient taking differently: Inhale 1 puff as needed Rinse mouth after use.) 60 blister 3    gabapentin (NEURONTIN) 300 mg capsule Take 1 capsule (300 mg total) by mouth 3 (three) times a day 270 capsule 3    levothyroxine 50 mcg tablet TAKE 1 TABLET BY MOUTH EVERY DAY 90 tablet 1    midodrine (PROAMATINE) 5 mg tablet Take 5 mg by mouth daily      mupirocin (BACTROBAN) 2 % ointment Apply topically 3 (three) times a day 15 g 0    ondansetron (ZOFRAN-ODT) 4 mg disintegrating tablet Take 1 tablet (4 mg total) by mouth every 6 (six) hours as needed for nausea 15 tablet 0    QUEtiapine (SEROquel) 100 mg tablet TAKE 1 TABLET BY MOUTH DAILY AT BEDTIME 90 tablet 1    albuterol (PROVENTIL HFA,VENTOLIN HFA) 90 mcg/act inhaler Inhale 2 puffs every 6 (six) hours as needed for wheezing or shortness of breath (Patient not taking: Reported on 4/21/2025) 18 g 0    benzonatate (TESSALON PERLES) 100 mg capsule Take 1 capsule (100 mg total) by mouth 2 (two) times a day as needed for cough (Patient not taking: Reported on 4/21/2025) 21 capsule 0    docusate sodium (COLACE) 100 mg capsule Take 1 capsule (100 mg total) by mouth 2 (two) times a day (Patient not taking: Reported on 1/25/2025) 60 capsule 0     No current facility-administered  medications for this visit.         Psychotherapy Provided:     Individual psychotherapy provided: Yes  Supportive therapy  Medication evaluation  Mood assessment  Surveys reviewed and confirmed  Normalized and validated her feelings  Grief therapy         Visit Time    Visit Start Time: 8:45   Visit Stop Time: 9:15  Total Visit Duration:  30 minutes    Callie Guidry, PhD 04/21/25    This note was completed in part utilizing Dragon dictation Software. Grammatical, translation, syntax errors, random word insertions, spelling mistakes, and incomplete sentences may be an occasional consequence of this system secondary to software limitations with voice recognition, ambient noise, and hardware issues. If you have any questions or concerns about the content, text, or information contained within the body of this dictation, please contact the provider for clarification.

## 2025-04-21 NOTE — ASSESSMENT & PLAN NOTE
Seroquel is reported to be effective to help her sleep and her anxiety.  ELLY-7 score of 2 indicates no anxiety.  She is working and reports marital problems or improved.

## 2025-04-21 NOTE — ASSESSMENT & PLAN NOTE
Cymbalta 60 mg daily  Ina reports medication is effective in managing depression denies depression or suicidal ideation.  PHQ-9 score of 1 indicates no depression or suicidal ideation.  She reports her appetite and sleep patterns are good.  Mother  in  and she is coping with the support of her sisters and her .  Continues with financial stress however she is coping.  Support was provided

## 2025-04-23 ENCOUNTER — TELEPHONE (OUTPATIENT)
Age: 70
End: 2025-04-23

## 2025-04-23 NOTE — TELEPHONE ENCOUNTER
Patient called in stated starting new job and has physical form to be filled out. Patient was seen on 03/31 for physical and would like to be capo to drop off paperwork and be able to get a TB test done. Patient would like a call back. Thank you.

## 2025-04-25 ENCOUNTER — APPOINTMENT (EMERGENCY)
Dept: RADIOLOGY | Facility: HOSPITAL | Age: 70
End: 2025-04-25
Payer: OTHER MISCELLANEOUS

## 2025-04-25 ENCOUNTER — HOSPITAL ENCOUNTER (EMERGENCY)
Facility: HOSPITAL | Age: 70
Discharge: HOME | End: 2025-04-25
Attending: EMERGENCY MEDICINE
Payer: OTHER MISCELLANEOUS

## 2025-04-25 VITALS
TEMPERATURE: 97.2 F | BODY MASS INDEX: 26.02 KG/M2 | RESPIRATION RATE: 18 BRPM | DIASTOLIC BLOOD PRESSURE: 89 MMHG | HEART RATE: 85 BPM | OXYGEN SATURATION: 98 % | SYSTOLIC BLOOD PRESSURE: 141 MMHG | WEIGHT: 160 LBS

## 2025-04-25 DIAGNOSIS — M54.2 NECK PAIN: ICD-10-CM
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
## 2025-04-25 DIAGNOSIS — J34.89 NASAL PAIN: ICD-10-CM

## 2025-04-25 PROCEDURE — 72125 CT NECK SPINE W/O DYE: CPT

## 2025-04-25 PROCEDURE — 70450 CT HEAD/BRAIN W/O DYE: CPT

## 2025-04-25 PROCEDURE — 99284 EMERGENCY DEPT VISIT MOD MDM: CPT | Performed by: EMERGENCY MEDICINE

## 2025-04-25 PROCEDURE — 99284 EMERGENCY DEPT VISIT MOD MDM: CPT

## 2025-04-25 RX ORDER — IBUPROFEN 600 MG/1
600 TABLET, FILM COATED ORAL ONCE
Status: COMPLETED | OUTPATIENT
Start: 2025-04-25 | End: 2025-04-25

## 2025-04-25 RX ADMIN — IBUPROFEN 600 MG: 600 TABLET ORAL at 13:34

## 2025-04-25 NOTE — ED PROVIDER NOTES
Time reflects when diagnosis was documented in both MDM as applicable and the Disposition within this note       Time User Action Codes Description Comment    4/25/2025  2:55 PM Reece Gómez Add [W19.XXXA] Fall, initial encounter     4/25/2025  2:56 PM Reece Gómez Add [J34.89] Nasal pain     4/25/2025  2:56 PM Reece Gómez Add [M54.2] Neck pain           ED Disposition       ED Disposition   Discharge    Condition   Stable    Date/Time   Fri Apr 25, 2025  2:55 PM    Comment   Ina Tolbert discharge to home/self care.                   Assessment & Plan       Medical Decision Making  Patient presenting after mechanical fall.  Midline cervical tenderness, unable to clear patient cervical spine clinically Via Nexus criteria.  Maintained in cervical collar.  I ordered and reviewed a CT cervical spine.  Given patient's age, comorbidity, head strike, I ordered and reviewed a CT head to evaluate for intracranial trauma.  I ordered and independently interpreted plain films of the right wrist to evaluate for fracture or dislocation.  Patient overall well-appearing with a nonfocal neurologic exam.  Patient treated symptomatically with Motrin, ice.  Patient ultimately refusing x-ray.  Given the resolution of her pain and lack of bony tenderness, after shared decision making, x-ray not performed.  CT without demonstrated traumatic injury.  Patient well-appearing on reassessment.  Provided with reassurance, discharged with return precautions.    Amount and/or Complexity of Data Reviewed  Radiology: ordered.    Risk  Prescription drug management.             Medications   ibuprofen (MOTRIN) tablet 600 mg (600 mg Oral Given 4/25/25 1334)       ED Risk Strat Scores                    No data recorded        SBIRT 22yo+      Flowsheet Row Most Recent Value   Initial Alcohol Screen: US AUDIT-C     1. How often do you have a drink containing alcohol? 0 Filed at: 04/25/2025 9210   2. How many drinks containing  alcohol do you have on a typical day you are drinking?  0 Filed at: 04/25/2025 1255   3a. Male UNDER 65: How often do you have five or more drinks on one occasion? 0 Filed at: 04/25/2025 1255   3b. FEMALE Any Age, or MALE 65+: How often do you have 4 or more drinks on one occassion? 0 Filed at: 04/25/2025 1255   Audit-C Score 0 Filed at: 04/25/2025 1255   SHARON: How many times in the past year have you...    Used an illegal drug or used a prescription medication for non-medical reasons? Never Filed at: 04/25/2025 1255                            History of Present Illness       Chief Complaint   Patient presents with    Fall     States she tripped at work and hit face first on ground, c/o forehead pain and neck pain. C collar applied. No LOC, no thinners       Past Medical History:   Diagnosis Date    Abdominal adhesions     Alcoholism ([facility]) 1996    in remission    Anesthesia complication     difficult to wake up    Anxiety     Arthritis     Asthma     with exertion    Cancer (HCC)     melanoma    Colon polyp     Crohn's disease ([facility])     Depression     Depression     Disease of thyroid gland     Fibromyalgia     Fibromyalgia, primary     Fractures 2006    GERD (gastroesophageal reflux disease)     History of cholecystectomy 09/07/2019    Hyperlipidemia     Infected tooth 01/2024    Tooth #14-was treated with antibiotics-endodontal appt 2/1 may need root canal    Irregular heart beat     can be tachy; also has orthoststic hypotension    Irritable bowel syndrome 1990    Lazy eye     resolved: 3/27/17    Medical clearance for psychiatric admission 07/13/2021    Melanoma ([facility]) 2010    Mild asthma 11/04/2020    Osteoarthritis 07/2018    Osteopenia     with joint pain-elevated DEV    Polymyalgia (HCC) 2/10/2022    Psychiatric disorder     Shortness of breath     assoc with tachycardia    Stomach disorder 1995    Tinnitus     Wears glasses     for reading      Past Surgical History:   Procedure Laterality Date    ABDOMINAL  SURGERY      lysis of adhesions x 2    APPENDECTOMY      CERVICAL FUSION      May 5,2021 and Jan. 01,2020    CHOLECYSTECTOMY      open    COLONOSCOPY      DILATION AND CURETTAGE OF UTERUS      FOOT SURGERY  05/2017    NECK SURGERY  01/2019 5/2021    NEUROMA EXCISION Right 05/26/2017    Procedure: EXCISION MASS / FIBROMA FOOT;  Surgeon: Jorden Crespo DPM;  Location: [facility];  Service:     PARS PLANA VITRECTOMY W/ REPAIR OF MACULAR HOLE  12/23/2020    RI ARTHRP KNE CONDYLE&PLATU MEDIAL&LAT COMPARTMENTS Right 2/6/2024    Procedure: ARTHROPLASTY KNEE TOTAL W ROBOT - RIGHT - PRESS FIT - OVERNIGHT;  Surgeon: Jairon Kim DO;  Location: [facility];  Service: Orthopedics    RI XCAPSL CTRC RMVL INSJ IO LENS PROSTH W/O ECP Left 12/06/2021    Procedure: EXTRACTION EXTRACAPSULAR CATARACT PHACO INTRAOCULAR LENS (IOL);  Surgeon: Mandeep Chavez MD;  Location: [facility] MAIN OR;  Service: Ophthalmology    RIGHT OOPHORECTOMY      UPPER GASTROINTESTINAL ENDOSCOPY  5/2019    WISDOM TOOTH EXTRACTION      x4      Family History   Problem Relation Age of Onset    Heart disease Mother     Stroke Mother 62    COPD Mother     Arthritis Mother     Asthma Mother     Hypertension Father     Dislocations Sister     Multiple sclerosis Sister     Neurological problems Sister     Scoliosis Sister     Depression Sister     Mental illness Sister     No Known Problems Maternal Grandmother     No Known Problems Maternal Grandfather     No Known Problems Paternal Grandmother     No Known Problems Paternal Grandfather     Kidney disease Brother         kidney transplant    No Known Problems Maternal Aunt     No Known Problems Maternal Uncle     No Known Problems Paternal Aunt     No Known Problems Paternal Uncle     ADD / ADHD Neg Hx     Anesthesia problems Neg Hx     Cancer Neg Hx     Clotting disorder Neg Hx     Collagen disease Neg Hx     Diabetes Neg Hx     Dislocations Neg Hx     Learning disabilities Neg Hx     Neurological problems Neg Hx      Osteoporosis Neg Hx     Rheumatologic disease Neg Hx     Scoliosis Neg Hx     Vascular Disease Neg Hx       Social History     Tobacco Use    Smoking status: Never     Passive exposure: Never    Smokeless tobacco: Never   Vaping Use    Vaping status: Never Used   Substance Use Topics    Alcohol use: Not Currently    Drug use: No      E-Cigarette/Vaping    E-Cigarette Use Never User       E-Cigarette/Vaping Substances    Nicotine No     THC No     CBD No     Flavoring No     Other No     Unknown No       I have reviewed and agree with the history as documented.     Patient is a 69-year-old female presenting for evaluation after fall from standing.  Patient states that she was at work when she stepped in a hole and fell forward onto her face and her right wrist.  Patient denies loss of consciousness.  Patient was able to get up with assistance and ambulate.  Patient complains of a mild generalized headache, more moderate to severe neck pain worse with turning her head.  Patient denies arm or leg weakness or numbness, nausea, vomiting, dizziness.  Patient denies any additional preceding falls.  Patient denies anticoagulation or antiplatelet use.  Patient additionally complained of mild pain in the right wrist.        Review of Systems   Constitutional:  Negative for chills, fatigue and fever.   Respiratory:  Negative for cough and shortness of breath.    Gastrointestinal:  Negative for diarrhea, nausea and vomiting.   Musculoskeletal:  Positive for arthralgias (Right wrist) and neck pain. Negative for back pain, myalgias and neck stiffness.   Neurological:  Positive for headaches. Negative for weakness and numbness.   Psychiatric/Behavioral:  Negative for confusion.    All other systems reviewed and are negative.          Objective       ED Triage Vitals [04/25/25 1255]   Temperature Pulse Blood Pressure Respirations SpO2 Patient Position - Orthostatic VS   (!) 97.2 °F (36.2 °C) 85 141/89 18 98 % --      Temp src Heart  Rate Source BP Location FiO2 (%) Pain Score    -- -- -- -- 8      Vitals      Date and Time Temp Pulse SpO2 Resp BP Pain Score FACES Pain Rating User   04/25/25 1334 -- -- -- -- -- 6 -- SH   04/25/25 1255 97.2 °F (36.2 °C) 85 98 % 18 141/89 8 -- MIRIAM            Physical Exam  Vitals and nursing note reviewed.   Constitutional:       General: She is not in acute distress.     Appearance: Normal appearance. She is not ill-appearing, toxic-appearing or diaphoretic.      Comments: Well-appearing, nontoxic, nondistressed   HENT:      Head: Normocephalic and atraumatic.      Comments: Mild ecchymosis around the nasal bone with mild tenderness, no swelling or deformity.  No septal hematoma.  No scalp hematoma.  No additional facial bony tenderness.  Pupils 3 mm bilaterally, reactive to light.  Moist mucous membranes.     Right Ear: External ear normal.      Left Ear: External ear normal.   Eyes:      General:         Right eye: No discharge.         Left eye: No discharge.   Pulmonary:      Effort: No respiratory distress.   Abdominal:      General: There is no distension.   Musculoskeletal:         General: No deformity.      Cervical back: Normal range of motion.      Comments: Patient with midline cervical tenderness from C2-C5 without step-off or deformity.  Cervical collar in place.  No thoracic or lumbar tenderness, step-off, deformity.  Normal-appearing right wrist without swelling, ecchymosis, or deformity.  No tenderness in the anatomic snuffbox.   Skin:     Findings: No lesion or rash.   Neurological:      Mental Status: She is alert and oriented to person, place, and time. Mental status is at baseline.      Comments: Cranial nerves II through XII intact.  Full strength and sensation bilaterally in upper and lower extremities   Psychiatric:         Mood and Affect: Mood and affect normal.         Results Reviewed       None            CT head without contrast   Final Interpretation by Bang Romano MD (04/25 8401)       No acute intracranial CT abnormality                  Workstation performed: [id number]         CT spine cervical without contrast   Final Interpretation by Bang Romano MD (04/25 2534)      No acute cervical spine fracture or traumatic malalignment.                  Workstation performed: [id number]             Procedures    ED Medication and Procedure Management   Prior to Admission Medications   Prescriptions Last Dose Informant Patient Reported? Taking?   Calcium Carb-Cholecalciferol 600-12.5 MG-MCG CAPS  Self Yes No   Sig: Take by mouth daily in the early morning   DULoxetine (CYMBALTA) 60 mg delayed release capsule   No No   Sig: TAKE 1 CAPSULE BY MOUTH EVERY DAY   Denta 5000 Plus 1.1 % CREA  Self Yes No   Sig: USE ONCE DAILY AT NIGHT   Fluticasone-Salmeterol (Wixela Inhub) 100-50 mcg/dose inhaler   No No   Sig: Inhale 1 puff 2 (two) times a day Rinse mouth after use.   Patient taking differently: Inhale 1 puff as needed Rinse mouth after use.   QUEtiapine (SEROquel) 100 mg tablet   No No   Sig: TAKE 1 TABLET BY MOUTH DAILY AT BEDTIME   albuterol (2.5 mg/3 mL) 0.083 % nebulizer solution   No No   Sig: Take 3 mL (2.5 mg total) by nebulization every 6 (six) hours as needed for wheezing or shortness of breath   albuterol (2.5 mg/3 mL) 0.083 % nebulizer solution   No No   Sig: Take 3 mL (2.5 mg total) by nebulization every 6 (six) hours as needed for wheezing or shortness of breath   albuterol (PROVENTIL HFA,VENTOLIN HFA) 90 mcg/act inhaler   No No   Sig: Inhale 2 puffs every 6 (six) hours as needed for wheezing or shortness of breath   Patient not taking: Reported on 4/21/2025   albuterol (ProAir HFA) 90 mcg/act inhaler  Self No No   Sig: Inhale 2 puffs every 6 (six) hours as needed for wheezing   benzonatate (TESSALON PERLES) 100 mg capsule   No No   Sig: Take 1 capsule (100 mg total) by mouth 2 (two) times a day as needed for cough   Patient not taking: Reported on 4/21/2025   benzonatate (TESSALON PERLES) 100  mg capsule   No No   Sig: Take 1 capsule (100 mg total) by mouth 3 (three) times a day as needed for cough   docusate sodium (COLACE) 100 mg capsule  Self No No   Sig: Take 1 capsule (100 mg total) by mouth 2 (two) times a day   Patient not taking: Reported on 1/25/2025   esomeprazole (NexIUM) 40 MG capsule   No No   Sig: TAKE 1 CAPSULE BY MOUTH EVERY DAY BEFORE BREAKFAST   gabapentin (NEURONTIN) 300 mg capsule  Self No No   Sig: Take 1 capsule (300 mg total) by mouth 3 (three) times a day   levothyroxine 50 mcg tablet   No No   Sig: TAKE 1 TABLET BY MOUTH EVERY DAY   midodrine (PROAMATINE) 5 mg tablet  Self Yes No   Sig: Take 5 mg by mouth daily   mupirocin (BACTROBAN) 2 % ointment   No No   Sig: Apply topically 3 (three) times a day   ondansetron (ZOFRAN-ODT) 4 mg disintegrating tablet   No No   Sig: Take 1 tablet (4 mg total) by mouth every 6 (six) hours as needed for nausea      Facility-Administered Medications: None     Discharge Medication List as of 4/25/2025  2:56 PM        CONTINUE these medications which have NOT CHANGED    Details   !! albuterol (2.5 mg/3 mL) 0.083 % nebulizer solution Take 3 mL (2.5 mg total) by nebulization every 6 (six) hours as needed for wheezing or shortness of breath, Starting Sat 12/21/2024, Normal      !! albuterol (2.5 mg/3 mL) 0.083 % nebulizer solution Take 3 mL (2.5 mg total) by nebulization every 6 (six) hours as needed for wheezing or shortness of breath, Starting Mon 2/10/2025, Normal      !! albuterol (ProAir HFA) 90 mcg/act inhaler Inhale 2 puffs every 6 (six) hours as needed for wheezing, Starting Thu 8/8/2024, Normal      !! albuterol (PROVENTIL HFA,VENTOLIN HFA) 90 mcg/act inhaler Inhale 2 puffs every 6 (six) hours as needed for wheezing or shortness of breath, Starting Sat 12/21/2024, Print      !! benzonatate (TESSALON PERLES) 100 mg capsule Take 1 capsule (100 mg total) by mouth 2 (two) times a day as needed for cough, Starting Fri 12/27/2024, Normal      !!  benzonatate (TESSALON PERLES) 100 mg capsule Take 1 capsule (100 mg total) by mouth 3 (three) times a day as needed for cough, Starting Mon 2/10/2025, Normal      Calcium Carb-Cholecalciferol 600-12.5 MG-MCG CAPS Take by mouth daily in the early morning, Historical Med      Denta 5000 Plus 1.1 % CREA USE ONCE DAILY AT NIGHT, Historical Med      docusate sodium (COLACE) 100 mg capsule Take 1 capsule (100 mg total) by mouth 2 (two) times a day, Starting Wed 2/7/2024, Normal      DULoxetine (CYMBALTA) 60 mg delayed release capsule TAKE 1 CAPSULE BY MOUTH EVERY DAY, Starting Mon 11/11/2024, Normal      esomeprazole (NexIUM) 40 MG capsule TAKE 1 CAPSULE BY MOUTH EVERY DAY BEFORE BREAKFAST, Starting Thu 12/5/2024, Normal      Fluticasone-Salmeterol (Wixela Inhub) 100-50 mcg/dose inhaler Inhale 1 puff 2 (two) times a day Rinse mouth after use., Starting Mon 11/11/2024, Normal      gabapentin (NEURONTIN) 300 mg capsule Take 1 capsule (300 mg total) by mouth 3 (three) times a day, Starting Fri 7/12/2024, Normal      levothyroxine 50 mcg tablet TAKE 1 TABLET BY MOUTH EVERY DAY, Starting Fri 1/3/2025, Normal      midodrine (PROAMATINE) 5 mg tablet Take 5 mg by mouth daily, Starting Mon 4/29/2024, Historical Med      mupirocin (BACTROBAN) 2 % ointment Apply topically 3 (three) times a day, Starting Mon 3/31/2025, Normal      ondansetron (ZOFRAN-ODT) 4 mg disintegrating tablet Take 1 tablet (4 mg total) by mouth every 6 (six) hours as needed for nausea, Starting Wed 4/9/2025, Normal      QUEtiapine (SEROquel) 100 mg tablet TAKE 1 TABLET BY MOUTH DAILY AT BEDTIME, Starting Mon 1/20/2025, Normal       !! - Potential duplicate medications found. Please discuss with provider.        No discharge procedures on file.  ED SEPSIS DOCUMENTATION   Time reflects when diagnosis was documented in both MDM as applicable and the Disposition within this note       Time User Action Codes Description Comment    4/25/2025  2:55 PM Dalia  Reece Herbert [W19.XXXA] Fall, initial encounter     4/25/2025  2:56 PM Reece Gómez [J34.89] Nasal pain     4/25/2025  2:56 PM Reece Gómez [M54.2] Neck pain                  Reece Gómez MD  04/25/25 1500

## 2025-04-25 NOTE — DISCHARGE INSTRUCTIONS
Your CAT scans today were normal.  Continue Tylenol, Motrin, ice as needed for pain.  If you have any severe worsening pain, confusion, severe nausea and vomiting, new weakness or numbness, return to the emergency department.

## 2025-04-25 NOTE — Clinical Note
Ina Tolbert was seen and treated in our emergency department on 4/25/2025.    No restrictions            Diagnosis:     Ina  may return to work on return date.    She may return on this date: 04/28/2025         If you have any questions or concerns, please don't hesitate to call.      Jasper Negron, DO    ______________________________           _______________          _______________  Hospital Representative                              Date                                Time

## 2025-04-30 ENCOUNTER — CLINICAL SUPPORT (OUTPATIENT)
Dept: FAMILY MEDICINE CLINIC | Facility: CLINIC | Age: 70
End: 2025-04-30
Payer: COMMERCIAL

## 2025-04-30 DIAGNOSIS — Z11.1 SCREENING-PULMONARY TB: Primary | ICD-10-CM

## 2025-04-30 PROCEDURE — 86580 TB INTRADERMAL TEST: CPT

## 2025-05-02 ENCOUNTER — CLINICAL SUPPORT (OUTPATIENT)
Dept: FAMILY MEDICINE CLINIC | Facility: CLINIC | Age: 70
End: 2025-05-02

## 2025-05-02 DIAGNOSIS — Z11.1 ENCOUNTER FOR PPD SKIN TEST READING: Primary | ICD-10-CM

## 2025-05-02 LAB
INDURATION: 0 MM
TB SKIN TEST: NEGATIVE

## 2025-05-07 ENCOUNTER — OFFICE VISIT (OUTPATIENT)
Dept: PULMONOLOGY | Facility: MEDICAL CENTER | Age: 70
End: 2025-05-07
Payer: COMMERCIAL

## 2025-05-07 VITALS
HEIGHT: 61 IN | TEMPERATURE: 98.2 F | SYSTOLIC BLOOD PRESSURE: 122 MMHG | RESPIRATION RATE: 16 BRPM | DIASTOLIC BLOOD PRESSURE: 80 MMHG | OXYGEN SATURATION: 95 % | BODY MASS INDEX: 30.78 KG/M2 | HEART RATE: 96 BPM | WEIGHT: 163 LBS

## 2025-05-07 DIAGNOSIS — J20.8 ACUTE BRONCHITIS DUE TO OTHER SPECIFIED ORGANISMS: ICD-10-CM

## 2025-05-07 DIAGNOSIS — R05.3 CHRONIC COUGH: Primary | ICD-10-CM

## 2025-05-07 DIAGNOSIS — J20.9 ACUTE BRONCHITIS, UNSPECIFIED ORGANISM: ICD-10-CM

## 2025-05-07 DIAGNOSIS — J98.4 SMALL AIRWAYS DISEASE: ICD-10-CM

## 2025-05-07 DIAGNOSIS — R93.89 ABNORMAL CT OF THE CHEST: Chronic | ICD-10-CM

## 2025-05-07 PROBLEM — J84.9 ILD (INTERSTITIAL LUNG DISEASE) (HCC): Status: RESOLVED | Noted: 2024-11-19 | Resolved: 2025-05-07

## 2025-05-07 PROCEDURE — 95012 NITRIC OXIDE EXP GAS DETER: CPT | Performed by: NURSE PRACTITIONER

## 2025-05-07 PROCEDURE — 99214 OFFICE O/P EST MOD 30 MIN: CPT | Performed by: NURSE PRACTITIONER

## 2025-05-07 RX ORDER — PREDNISONE 20 MG/1
TABLET ORAL
Qty: 25 TABLET | Refills: 0 | Status: SHIPPED | OUTPATIENT
Start: 2025-05-07

## 2025-05-07 RX ORDER — AZITHROMYCIN 250 MG/1
TABLET, FILM COATED ORAL
Qty: 6 TABLET | Refills: 0 | Status: SHIPPED | OUTPATIENT
Start: 2025-05-07 | End: 2025-05-11

## 2025-05-07 NOTE — ASSESSMENT & PLAN NOTE
Personally reviewed CT of chest that was done in December 2024.  There was tree-in-bud pattern noted.  A repeat CT will be done in the future.  However currently since patient has a bronchitis, we will see her in a month and then possibly repeat CT at that time and is also noted the patient did have a workup for ILD.  There is that most of the workup was negative.  That included DEV p-ANCA c-ANCA etc.

## 2025-05-07 NOTE — ASSESSMENT & PLAN NOTE
Does report an increase in cough and what she describes as rust colored sputum that started the last 3 days.  She is going to be treated for bronchitis with azithromycin via Z-Kaleb and also tapering dose of prednisone.  Did to take her prednisone with food in the morning.  She will begin 20 mg tablets, 2 tablets daily x 5 days then 1 tablet daily x 5 days follow-up will be in 1 month

## 2025-05-07 NOTE — ASSESSMENT & PLAN NOTE
Small airway disease she did have a mildly positive methacholine challenge test.  The drop in FEV1 24% was 16 mg/mL dose.  She is going to resume her Wixela 100 mcg this 1 puff twice daily.  Not use this in some time.  However she does now have which she is describing as a dark lux color expectorant with cough.  We will give her Z-Kaleb to see if this improves her symptoms  Also give her Z-Kaleb and start prednisone 20 mg pills.  Take 2 tabs daily x 5 days and 1 tab daily for 5 days she can return to the office in 1 month

## 2025-05-07 NOTE — PROGRESS NOTES
Detail Level: Zone Follow-up  Visit - Pulmonary Medicine   Name: Ina Tolbert      : 1955      MRN: [id number]  Encounter Provider: SHRAVAN Jimenez  Encounter Date: 2025   Encounter department: [facility]  :  Assessment & Plan  Chronic cough    Orders:    POCT FeNO    Small airways disease  Small airway disease she did have a mildly positive methacholine challenge test.  The drop in FEV1 24% was 16 mg/mL dose.  She is going to resume her Wixela 100 mcg this 1 puff twice daily.  Not use this in some time.  However she does now have which she is describing as a dark lux color expectorant with cough.  We will give her Z-Kaleb to see if this improves her symptoms  Also give her Z-Kaleb and start prednisone 20 mg pills.  Take 2 tabs daily x 5 days and 1 tab daily for 5 days she can return to the office in 1 month         Acute bronchitis, unspecified organism    Orders:    azithromycin (ZITHROMAX) 250 mg tablet; Take 2 tablets today then 1 tablet daily x 4 days    predniSONE 20 mg tablet; 2 tablets daily x 5 days, 1 tab daily x 5 days    Acute bronchitis due to other specified organisms  Does report an increase in cough and what she describes as rust colored sputum that started the last 3 days.  She is going to be treated for bronchitis with azithromycin via Z-Kaleb and also tapering dose of prednisone.  Did to take her prednisone with food in the morning.  She will begin 20 mg tablets, 2 tablets daily x 5 days then 1 tablet daily x 5 days follow-up will be in 1 month         Abnormal CT of the chest  Personally reviewed CT of chest that was done in 2024.  There was tree-in-bud pattern noted.  A repeat CT will be done in the future.  However currently since patient has a bronchitis, we will see her in a month and then possibly repeat CT at that time and is also noted the patient did have a workup for ILD.  There is that most of the workup was negative.  That included DEV p-ANCA c-ANCA  Action 2: Continue etc.           Return in about 4 weeks (around 6/4/2025).    History of Present Illness   Ina Tolbert is a 69 y.o. female who presents who presents with hemoptysis.  This 69-year-old female was seen in consultation in pulmonology November 11, 2024.  She apparently had a myriad of symptoms rheumatological he but was seronegative.  ILD serological evaluation was performed she had CT of chest that showed mild bronchiectasis and mosaic ism on CT that was related to small airway disease.  Patient has a history of previous TIA, hypertension hypothyroidism Raynaud's.  She was hospitalized previously for Crohn's versus Campylobacter and was on a steroid taper she was never a smoker.  Had CT of the chest was done via PE protocol.  There was no filling defects to suggest acute or chronic pulmonary embolism.  Lungs showed limited evaluation there was suggestion of mild mosaic attenuation compatible with small vessel small airway disease with no suspicious lesions and no consolidation.  Patient did have a second CT of chest PE study done March 13, 2024 there was no PE demonstrated there was no consolidations endobronchial lesions bronchiectasis no interstitial lung abnormalities or suspicious pulmonary nodules there was very subtle mosaic attenuation pattern significantly less pronounced than last December this could represent small chronic airway disease  I have the albuterol vessel calibers appear normal even entities loosened segment speaking against chronic vascular because of this disease  Finally her last CT showed scattered tree-in-bud opacities in the right upper and lower lobes also in the left lower lobe central airways are where pain.  Findings were compatible with small airway disease  Patient had complete PFT done in December 2023.  There was no significant bronchodilator response lung volumes reveal possible mild restriction DLCO was mildly decreased and flow-volume loops appeared normal.  Forced vital capacity  "was 2.11 L 69% FEV1 was 1.7 473% obstruction ratio 82  Patient also had methacholine challenge test was minimal reduction in FEV1 24% 16 mg/mL dose FEV1 returned to normal after bronchodilator borderline degree reactivity to administration of methacholine  Also had spirometry done again on November 2024 there was possible restriction postbronchodilator test was not performed normal corrected diffusion    Review of Systems   Constitutional: Negative.    HENT: Negative.     Respiratory:  Positive for cough and shortness of breath.         Dark jose miguel hemoptysis   Cardiovascular: Negative.    Gastrointestinal: Negative.    Endocrine: Negative.    Genitourinary: Negative.    Musculoskeletal: Negative.    Allergic/Immunologic: Negative.    Neurological: Negative.    Hematological: Negative.    Psychiatric/Behavioral: Negative.         Aside from what is mentioned in the HPI, ROS is otherwise negative         Medical History Reviewed by provider this encounter:  Tobacco  Allergies  Meds  Problems  Med Hx  Surg Hx  Fam Hx     .    Objective   /80 (BP Location: Left arm, Patient Position: Sitting, Cuff Size: Standard)   Pulse 96   Temp 98.2 °F (36.8 °C) (Temporal)   Resp 16   Ht 5' 1\" (1.549 m)   Wt 73.9 kg (163 lb)   LMP  (LMP Unknown)   SpO2 95%   BMI 30.80 kg/m²     Physical Exam  Constitutional:       General: She is not in acute distress.     Appearance: She is normal weight.   HENT:      Head: Normocephalic and atraumatic.      Nose: Nose normal.      Mouth/Throat:      Mouth: Mucous membranes are moist.      Pharynx: Oropharyngeal exudate present.   Cardiovascular:      Rate and Rhythm: Normal rate and regular rhythm.      Pulses: Normal pulses.      Heart sounds: Normal heart sounds.   Pulmonary:      Effort: Pulmonary effort is normal.      Breath sounds: Wheezing present.   Musculoskeletal:         General: Normal range of motion.   Skin:     General: Skin is warm and dry.      Capillary " Continue Regimen: Cimzia and Prednisone "Refill: Capillary refill takes less than 2 seconds.   Neurological:      General: No focal deficit present.      Mental Status: She is alert and oriented to person, place, and time.   Psychiatric:         Mood and Affect: Mood normal.         Behavior: Behavior normal.           Diagnostic Data:  Labs: I personally reviewed the most recent laboratory data pertinent to today's visit.      Radiology results:        PFT/spirometry results:  No results found for: \"FEV1\", \"FVC\", \"ANJ3IPV\", \"TLC\", \"DLCO\"       Oximetry testing:      Other studies:      SHRAVAN Jimenez      " Plan: Keep follow up with Marcos Al MD on October 4th

## 2025-05-23 ENCOUNTER — TELEPHONE (OUTPATIENT)
Age: 70
End: 2025-05-23

## 2025-05-23 NOTE — TELEPHONE ENCOUNTER
Patient contacted the office to schedule a follow up visit with provider. Patient is now scheduled for 7/1/25  at 9:30a in office.

## 2025-06-02 ENCOUNTER — TELEPHONE (OUTPATIENT)
Age: 70
End: 2025-06-02

## 2025-06-06 PROBLEM — J20.8 ACUTE BRONCHITIS DUE TO OTHER SPECIFIED ORGANISMS: Status: RESOLVED | Noted: 2024-01-24 | Resolved: 2025-06-06

## 2025-06-17 ENCOUNTER — OFFICE VISIT (OUTPATIENT)
Dept: PULMONOLOGY | Facility: MEDICAL CENTER | Age: 70
End: 2025-06-17
Payer: COMMERCIAL

## 2025-06-17 VITALS
HEART RATE: 90 BPM | TEMPERATURE: 97.8 F | WEIGHT: 166 LBS | BODY MASS INDEX: 31.34 KG/M2 | DIASTOLIC BLOOD PRESSURE: 76 MMHG | OXYGEN SATURATION: 95 % | SYSTOLIC BLOOD PRESSURE: 110 MMHG | HEIGHT: 61 IN | RESPIRATION RATE: 12 BRPM

## 2025-06-17 DIAGNOSIS — R93.89 ABNORMAL CT OF THE CHEST: Chronic | ICD-10-CM

## 2025-06-17 DIAGNOSIS — J98.4 SMALL AIRWAYS DISEASE: Primary | ICD-10-CM

## 2025-06-17 DIAGNOSIS — J30.1 ALLERGIC RHINITIS DUE TO POLLEN, UNSPECIFIED SEASONALITY: Chronic | ICD-10-CM

## 2025-06-17 PROCEDURE — 99214 OFFICE O/P EST MOD 30 MIN: CPT | Performed by: NURSE PRACTITIONER

## 2025-06-17 RX ORDER — MONTELUKAST SODIUM 10 MG/1
10 TABLET ORAL
Qty: 30 TABLET | Refills: 5 | Status: SHIPPED | OUTPATIENT
Start: 2025-06-17

## 2025-06-17 NOTE — PROGRESS NOTES
Follow-up  Visit - Pulmonary Medicine   Name: Ina Tolbert      : 1955      MRN: [id number]  Encounter Provider: SHRAVAN Jimenez  Encounter Date: 2025   Encounter department: [facility]  :  Assessment & Plan  Small airways disease  Patient does have a history of positive methacholine challenge test.  She was here at the office and is here for follow-up.  She was treated for an acute bronchitis and was given azithromycin and tapering dose of prednisone.  She has now completed this and feels much improved any type of hemoptysis has completely resolved.  He has some very light wheezing on exhalation but after auscultating her lungs later in the visit it cleared up.  He is going to continue with her maintenance inhaler that includes Wixela 150/51 puff twice daily she does have rescue inhalation and uses it on a as needed basis she is stable at this time.  Her predicted peak flow was 440 L/min and today peak flow at its maximal was 350 L/min    Orders:    montelukast (SINGULAIR) 10 mg tablet; Take 1 tablet (10 mg total) by mouth daily at bedtime    Abnormal CT of the chest  Patient did have a CT of her chest done in 2024 there was tree-in-bud pattern that was noted.  Currently she is much improved.  I will see her in 6 months and at that time we can always repeat CT of chest I do not feel is necessary at this time.  She did have a workup for ILD previously that was negative note that this patient has never been a smoker         Allergic rhinitis due to pollen, unspecified seasonality  Does have a history of allergic rhinitis she is not certain whether or not this caused her recent exacerbation.  She no longer is using or any kind of steroid inhaler I will order for her Singulair 10 mg daily she is agreeable with this plan of care           Return in about 6 months (around 2025).    History of Present Illness   Ina Tolbert is a 69 y.o. female who presents with  "follow-up visit.  This patient is a 69-year-old female with a history of small airway disease.  Had a mild positive methacholine challenge test in the past.  When she was here she had a cough with some very light hemoptysis.  She also was treated for an acute bronchitis  Patient also had a CTA of her chest in December 2024 there were scattered tree-in-bud opacities the right upper and lower lobes and also in the left lower lobe.  She 320 had a workup that was negative for ILD.  Her current predicted peak flow is 470 L/min    Review of Systems   Constitutional:  Negative for appetite change and fever.   HENT:  Negative for ear pain, postnasal drip, rhinorrhea, sneezing, sore throat and trouble swallowing.    Respiratory:  Positive for cough, shortness of breath and wheezing.         Cough is dry, improved   Cardiovascular:  Negative for chest pain.   Musculoskeletal:  Positive for myalgias.   Allergic/Immunologic: Negative.    Neurological:  Negative for headaches.   Hematological: Negative.    Psychiatric/Behavioral: Negative.         Aside from what is mentioned in the HPI, ROS is otherwise negative         Medical History Reviewed by provider this encounter:  Tobacco  Allergies  Meds  Problems  Med Hx  Surg Hx  Fam Hx     .    Objective   /76 (BP Location: Left arm, Patient Position: Sitting, Cuff Size: Standard)   Pulse 90   Temp 97.8 °F (36.6 °C) (Tympanic)   Resp 12   Ht 5' 1\" (1.549 m)   Wt 75.3 kg (166 lb)   LMP  (LMP Unknown)   SpO2 95%   BMI 31.37 kg/m²     Physical Exam  Constitutional:       Appearance: She is normal weight.   HENT:      Head: Normocephalic and atraumatic.      Nose: Nose normal.      Mouth/Throat:      Mouth: Mucous membranes are moist.      Pharynx: Oropharynx is clear.     Eyes:      Pupils: Pupils are equal, round, and reactive to light.       Cardiovascular:      Rate and Rhythm: Normal rate and regular rhythm.      Pulses: Normal pulses.   Pulmonary:      " "Effort: Pulmonary effort is normal.      Breath sounds: Wheezing present.     Musculoskeletal:         General: Normal range of motion.     Skin:     General: Skin is warm and dry.      Capillary Refill: Capillary refill takes less than 2 seconds.     Neurological:      General: No focal deficit present.      Mental Status: She is alert and oriented to person, place, and time.     Psychiatric:         Mood and Affect: Mood normal.           Diagnostic Data:  Labs: I personally reviewed the most recent laboratory data pertinent to today's visit.      Radiology results:        PFT/spirometry results:  No results found for: \"FEV1\", \"FVC\", \"SMW8SOD\", \"TLC\", \"DLCO\"       Oximetry testing:      Other studies:  Answers submitted by the patient for this visit:  Pulmonology Questionnaire (Submitted on 6/15/2025)  Chief Complaint: Primary symptoms  Chronicity: chronic  When did you first notice your symptoms?: more than 1 month ago  How often do your symptoms occur?: rarely  Since you first noticed this problem, how has it changed?: resolved  Do you have shortness of breath that occurs with effort or exertion?: Yes  Do you have ear congestion?: No  Do you have heartburn?: No  Do you have fatigue?: No  Do you have nasal congestion?: No  Do you have shortness of breath when lying flat?: No  Do you have shortness of breath when you wake up?: No  Do you have sweats?: Yes  Have you experienced weight loss?: No  Which of the following makes your symptoms better?: OTC inhaler      SHRAVAN Jimenez      " Statement Selected

## 2025-06-17 NOTE — ASSESSMENT & PLAN NOTE
Patient does have a history of positive methacholine challenge test.  She was here at the office and is here for follow-up.  She was treated for an acute bronchitis and was given azithromycin and tapering dose of prednisone.  She has now completed this and feels much improved any type of hemoptysis has completely resolved.  He has some very light wheezing on exhalation but after auscultating her lungs later in the visit it cleared up.  He is going to continue with her maintenance inhaler that includes Wixela 150/51 puff twice daily she does have rescue inhalation and uses it on a as needed basis she is stable at this time.  Her predicted peak flow was 440 L/min and today peak flow at its maximal was 350 L/min    Orders:    montelukast (SINGULAIR) 10 mg tablet; Take 1 tablet (10 mg total) by mouth daily at bedtime

## 2025-06-17 NOTE — ASSESSMENT & PLAN NOTE
Patient did have a CT of her chest done in December 2024 there was tree-in-bud pattern that was noted.  Currently she is much improved.  I will see her in 6 months and at that time we can always repeat CT of chest I do not feel is necessary at this time.  She did have a workup for ILD previously that was negative note that this patient has never been a smoker

## 2025-06-17 NOTE — ASSESSMENT & PLAN NOTE
Does have a history of allergic rhinitis she is not certain whether or not this caused her recent exacerbation.  She no longer is using or any kind of steroid inhaler I will order for her Singulair 10 mg daily she is agreeable with this plan of care

## 2025-06-26 DIAGNOSIS — F33.41 MAJOR DEPRESSIVE DISORDER, RECURRENT, IN PARTIAL REMISSION (HCC): ICD-10-CM

## 2025-06-26 DIAGNOSIS — E03.8 OTHER SPECIFIED HYPOTHYROIDISM: ICD-10-CM

## 2025-06-26 RX ORDER — DULOXETIN HYDROCHLORIDE 60 MG/1
60 CAPSULE, DELAYED RELEASE ORAL DAILY
Qty: 90 CAPSULE | Refills: 0 | Status: SHIPPED | OUTPATIENT
Start: 2025-06-26

## 2025-06-27 RX ORDER — LEVOTHYROXINE SODIUM 50 UG/1
50 TABLET ORAL DAILY
Qty: 90 TABLET | Refills: 1 | Status: SHIPPED | OUTPATIENT
Start: 2025-06-27

## 2025-06-30 ENCOUNTER — TELEPHONE (OUTPATIENT)
Age: 70
End: 2025-06-30

## 2025-06-30 NOTE — TELEPHONE ENCOUNTER
Patient called and requested to cancel appt for 7/1 due to change in work schedule. She reported that she would call back at later time to reschedule.

## 2025-07-09 DIAGNOSIS — K21.9 GASTROESOPHAGEAL REFLUX DISEASE, UNSPECIFIED WHETHER ESOPHAGITIS PRESENT: ICD-10-CM

## 2025-07-09 DIAGNOSIS — R13.14 PHARYNGOESOPHAGEAL DYSPHAGIA: ICD-10-CM

## 2025-07-09 RX ORDER — ESOMEPRAZOLE MAGNESIUM 40 MG/1
40 CAPSULE, DELAYED RELEASE ORAL
Qty: 90 CAPSULE | Refills: 0 | Status: SHIPPED | OUTPATIENT
Start: 2025-07-09

## 2025-07-15 ENCOUNTER — HOSPITAL ENCOUNTER (OUTPATIENT)
Dept: RADIOLOGY | Facility: HOSPITAL | Age: 70
Discharge: HOME | End: 2025-07-15
Payer: COMMERCIAL

## 2025-07-15 VITALS — BODY MASS INDEX: 26.66 KG/M2 | HEIGHT: 65 IN | WEIGHT: 160 LBS

## 2025-07-15 DIAGNOSIS — Z12.39 SCREENING BREAST EXAMINATION: ICD-10-CM

## 2025-07-15 PROCEDURE — 77063 BREAST TOMOSYNTHESIS BI: CPT

## 2025-07-15 PROCEDURE — 77067 SCR MAMMO BI INCL CAD: CPT

## 2025-07-23 ENCOUNTER — TELEPHONE (OUTPATIENT)
Age: 70
End: 2025-07-23

## 2025-07-23 DIAGNOSIS — G95.9 CERVICAL MYELOPATHY WITH CERVICAL RADICULOPATHY  (HCC): ICD-10-CM

## 2025-07-23 DIAGNOSIS — G62.9 PERIPHERAL NEUROPATHY: ICD-10-CM

## 2025-07-23 DIAGNOSIS — M54.12 CERVICAL MYELOPATHY WITH CERVICAL RADICULOPATHY  (HCC): ICD-10-CM

## 2025-07-23 RX ORDER — GABAPENTIN 300 MG/1
300 CAPSULE ORAL 3 TIMES DAILY
Qty: 270 CAPSULE | Refills: 0 | Status: SHIPPED | OUTPATIENT
Start: 2025-07-23

## 2025-07-23 NOTE — TELEPHONE ENCOUNTER
Patient had to reschedule the 07/30 appointment with Ross due to a scheduling conflict.   Patient rescheduled for 10/13 at 1:30 message sent to MARTINE to add Patient to that appointment slot.

## 2025-07-28 ENCOUNTER — TELEPHONE (OUTPATIENT)
Age: 70
End: 2025-07-28

## 2025-07-30 ENCOUNTER — TELEPHONE (OUTPATIENT)
Age: 70
End: 2025-07-30

## 2025-07-31 ENCOUNTER — TELEMEDICINE (OUTPATIENT)
Dept: PSYCHIATRY | Facility: CLINIC | Age: 70
End: 2025-07-31
Payer: COMMERCIAL

## 2025-07-31 ENCOUNTER — CONSULT (OUTPATIENT)
Dept: PAIN MEDICINE | Facility: CLINIC | Age: 70
End: 2025-07-31
Payer: COMMERCIAL

## 2025-07-31 ENCOUNTER — TELEPHONE (OUTPATIENT)
Age: 70
End: 2025-07-31

## 2025-07-31 DIAGNOSIS — F63.0 GAMBLING DISORDER, MODERATE: ICD-10-CM

## 2025-07-31 DIAGNOSIS — Z98.890 H/O CERVICAL SPINE SURGERY: ICD-10-CM

## 2025-07-31 DIAGNOSIS — M79.18 MYOFASCIAL PAIN SYNDROME: Primary | ICD-10-CM

## 2025-07-31 DIAGNOSIS — F32.A ANXIETY AND DEPRESSION: Primary | ICD-10-CM

## 2025-07-31 DIAGNOSIS — F41.9 ANXIETY AND DEPRESSION: Primary | ICD-10-CM

## 2025-07-31 DIAGNOSIS — F51.05 INSOMNIA RELATED TO ANOTHER MENTAL DISORDER: ICD-10-CM

## 2025-07-31 DIAGNOSIS — M47.812 CERVICAL SPONDYLOSIS: ICD-10-CM

## 2025-07-31 PROCEDURE — 99204 OFFICE O/P NEW MOD 45 MIN: CPT | Performed by: STUDENT IN AN ORGANIZED HEALTH CARE EDUCATION/TRAINING PROGRAM

## 2025-07-31 PROCEDURE — 99214 OFFICE O/P EST MOD 30 MIN: CPT | Performed by: NURSE PRACTITIONER

## 2025-07-31 RX ORDER — SODIUM FLUORIDE 6 MG/ML
PASTE, DENTIFRICE DENTAL
COMMUNITY
Start: 2025-05-04

## 2025-07-31 RX ORDER — DULOXETIN HYDROCHLORIDE 30 MG/1
30 CAPSULE, DELAYED RELEASE ORAL DAILY
Qty: 30 CAPSULE | Refills: 3 | Status: SHIPPED | OUTPATIENT
Start: 2025-07-31

## 2025-08-06 ENCOUNTER — TELEPHONE (OUTPATIENT)
Age: 70
End: 2025-08-06

## 2025-08-12 ENCOUNTER — PROCEDURE VISIT (OUTPATIENT)
Age: 70
End: 2025-08-12
Payer: COMMERCIAL

## 2025-08-14 ENCOUNTER — EVALUATION (OUTPATIENT)
Dept: PHYSICAL THERAPY | Facility: CLINIC | Age: 70
End: 2025-08-14
Attending: STUDENT IN AN ORGANIZED HEALTH CARE EDUCATION/TRAINING PROGRAM
Payer: COMMERCIAL

## 2025-08-18 ENCOUNTER — OFFICE VISIT (OUTPATIENT)
Dept: PHYSICAL THERAPY | Facility: CLINIC | Age: 70
End: 2025-08-18
Attending: STUDENT IN AN ORGANIZED HEALTH CARE EDUCATION/TRAINING PROGRAM
Payer: COMMERCIAL

## 2025-08-18 DIAGNOSIS — M47.812 CERVICAL SPONDYLOSIS: Primary | ICD-10-CM

## 2025-08-18 PROCEDURE — 97110 THERAPEUTIC EXERCISES: CPT

## (undated) DEVICE — SUT VICRYL 0 CP-1 27 IN J267H

## (undated) DEVICE — HOOD: Brand: FLYTE, SURGICOOL

## (undated) DEVICE — ASTOUND SURGICAL GOWN, XXX LARGE, X-LONG: Brand: CONVERTORS

## (undated) DEVICE — MICROSURGICAL INSTRUMENT IRR. CYSTITOME 25GA STRAIGHT-REVERSE CUTTING: Brand: ALCON

## (undated) DEVICE — VELYS ROBOT-ASSISTED SOLUTION ARRAY DRILL PIN 125 MM X 4 MM: Brand: VELYS

## (undated) DEVICE — GLOVE INDICATOR PI UNDERGLOVE SZ 7.5 BLUE

## (undated) DEVICE — GLOVE INDICATOR PI UNDERGLOVE SZ 8.5 BLUE

## (undated) DEVICE — 3M™ STERI-DRAPE™ U-DRAPE 1015: Brand: STERI-DRAPE™

## (undated) DEVICE — TRAY FOLEY 16FR URIMETER SURESTEP

## (undated) DEVICE — CAPIT KNEE ATTUNE FB MEDIAL STAB AOX POR

## (undated) DEVICE — COBAN 4 IN STERILE

## (undated) DEVICE — B-H IRRIGATING CAN 19GA FLAT ANGLED 8MM: Brand: OPHTHALMIC CANNULA

## (undated) DEVICE — VELYS ROBOTIC-ASSISTED SOLUTION ARRAY SET - KNEE: Brand: VELYS

## (undated) DEVICE — DISPOSABLE EQUIPMENT COVER: Brand: LARGE TOWEL DRAPE

## (undated) DEVICE — NEPTUNE E-SEP SMOKE EVACUATION PENCIL, COATED, 70MM BLADE, PUSH BUTTON SWITCH: Brand: NEPTUNE E-SEP

## (undated) DEVICE — CLEARCUT® SLIT KNIFE INTREPID MICRO-COAXIAL SYSTEM 2.4 SB: Brand: CLEARCUT®; INTREPID

## (undated) DEVICE — DRAPE SHEET X-LG

## (undated) DEVICE — BANDAID WATERPROOF 1-7/8 X 4

## (undated) DEVICE — VEST SURGEON DISPOSABLE

## (undated) DEVICE — CHLORAPREP HI-LITE 26ML ORANGE

## (undated) DEVICE — HANDPIECE SET WITH HIGH FLOW TIP AND SUCTION TUBE: Brand: INTERPULSE

## (undated) DEVICE — BANDAGE, ESMARK LF STR 6"X9' (20/CS): Brand: CYPRESS

## (undated) DEVICE — ACTIVE FMS W/ INTREPID* ULTRA SLEEVES, 0.9MM 45° ABS* INTREPID* BALANCED TIP: Brand: ALCON

## (undated) DEVICE — THE MONARCH® "D" CARTRIDGE IS A SINGLE-USE POLYPROPYLENE CARTRIDGE FOR POSTERIOR CHAMBER IOL DELIVERY: Brand: MONARCH® III

## (undated) DEVICE — VELYS SATEL DRAPE

## (undated) DEVICE — GLOVE SRG BIOGEL 8

## (undated) DEVICE — ORTHOPEDIC PACK: Brand: CARDINAL HEALTH

## (undated) DEVICE — DRESSING MEPILEX AG BORDER 4 X 12 IN

## (undated) DEVICE — GLOVE SRG BIOGEL 7.5

## (undated) DEVICE — 3 BONE CEMENT MIXER: Brand: MIXEVAC

## (undated) DEVICE — 3M™ IOBAN™ 2 ANTIMICROBIAL INCISE DRAPE 6651EZ: Brand: IOBAN™ 2

## (undated) DEVICE — AIR INJECT CANNULA 27GA: Brand: OPHTHALMIC CANNULA

## (undated) DEVICE — CUFF TOURNIQUET 34 X 4 IN QUICK CONNECT DISP 1BLA

## (undated) DEVICE — DENTAL PACK: Brand: CARDINAL HEALTH

## (undated) DEVICE — TIBURON EXTREMITY SHEET: Brand: CONVERTORS

## (undated) DEVICE — VELYS ROBOT DRAPE

## (undated) DEVICE — ANTI-EMBOLISM STOCKINGS,THIGH LENGTH,LARGE,REGULAR,SIZE H: Brand: T.E.D.

## (undated) DEVICE — DUAL CUT SAGITTAL BLADE

## (undated) DEVICE — Device

## (undated) DEVICE — EYE PACK CUSTOM -FINNEGAN

## (undated) DEVICE — 450 ML BOTTLE OF 0.05% CHLORHEXIDINE GLUCONATE IN 99.95% STERILE WATER FOR IRRIGATION, USP AND APPLICATOR.: Brand: IRRISEPT ANTIMICROBIAL WOUND LAVAGE

## (undated) DEVICE — ADHESIVE SKIN HIGH VISCOSITY EXOFIN 1ML

## (undated) DEVICE — SUT STRATAFIX SPIRAL 3-0 PGA/PCL 30 X 30 CM SXMD2B410

## (undated) DEVICE — INTENDED FOR TISSUE SEPARATION, AND OTHER PROCEDURES THAT REQUIRE A SHARP SURGICAL BLADE TO PUNCTURE OR CUT.: Brand: BARD-PARKER ® CARBON RIB-BACK BLADES

## (undated) DEVICE — INTREPID® TRANSFORMER IA HP: Brand: INTREPID®

## (undated) DEVICE — STRETCH BANDAGE: Brand: CURITY

## (undated) DEVICE — SKIN MARKER DUAL TIP WITH RULER CAP, FLEXIBLE RULER AND LABELS: Brand: DEVON

## (undated) DEVICE — BASIC DOUBLE BASIN 2-LF: Brand: MEDLINE INDUSTRIES, INC.

## (undated) DEVICE — SPECIMEN CONTAINER STERILE PEEL PACK

## (undated) DEVICE — SUT STRATFIX SPIRAL PDS PLUS 1 CT-1/CT-1 12IN SXPP2B403

## (undated) DEVICE — VELYS BLADE SAW OSC 85 X 19 X 2MM

## (undated) DEVICE — GLOVE INDICATOR PI UNDERGLOVE SZ 8 BLUE

## (undated) DEVICE — VELYS ROBOT-ASSISTED SOLUTION ARRAY DRILL PIN 100 MM X 4 MM: Brand: VELYS

## (undated) DEVICE — CULTURE TUBE ANAEROBIC

## (undated) DEVICE — GLOVE SRG BIOGEL ORTHOPEDIC 8.5

## (undated) DEVICE — INSTRUMENT POUCH: Brand: CONVERTORS

## (undated) DEVICE — ANTIBACTERIAL UNDYED BRAIDED (POLYGLACTIN 910), SYNTHETIC ABSORBABLE SUTURE: Brand: COATED VICRYL